# Patient Record
Sex: MALE | Race: WHITE | Employment: FULL TIME | URBAN - METROPOLITAN AREA
[De-identification: names, ages, dates, MRNs, and addresses within clinical notes are randomized per-mention and may not be internally consistent; named-entity substitution may affect disease eponyms.]

---

## 2017-03-29 ENCOUNTER — HOSPITAL ENCOUNTER (INPATIENT)
Facility: CLINIC | Age: 62
LOS: 10 days | Discharge: HOME | DRG: 222 | End: 2017-04-08
Attending: INTERNAL MEDICINE | Admitting: INTERNAL MEDICINE
Payer: COMMERCIAL

## 2017-03-29 DIAGNOSIS — I50.23 ACUTE ON CHRONIC SYSTOLIC HEART FAILURE (H): Primary | ICD-10-CM

## 2017-03-29 DIAGNOSIS — G47.00 INSOMNIA, UNSPECIFIED TYPE: ICD-10-CM

## 2017-03-29 DIAGNOSIS — Z79.2 PROPHYLACTIC ANTIBIOTIC: ICD-10-CM

## 2017-03-29 DIAGNOSIS — I50.21 ACUTE SYSTOLIC CONGESTIVE HEART FAILURE (H): ICD-10-CM

## 2017-03-29 PROBLEM — I50.9 CHF (CONGESTIVE HEART FAILURE) (H): Status: ACTIVE | Noted: 2017-03-29

## 2017-03-29 LAB
ALBUMIN SERPL-MCNC: 3.6 G/DL (ref 3.4–5)
ALP SERPL-CCNC: 204 U/L (ref 40–150)
ALT SERPL W P-5'-P-CCNC: 99 U/L (ref 0–70)
ANION GAP SERPL CALCULATED.3IONS-SCNC: 7 MMOL/L (ref 3–14)
AST SERPL W P-5'-P-CCNC: 60 U/L (ref 0–45)
BILIRUB DIRECT SERPL-MCNC: 1.2 MG/DL (ref 0–0.2)
BILIRUB SERPL-MCNC: 2.3 MG/DL (ref 0.2–1.3)
BUN SERPL-MCNC: 20 MG/DL (ref 7–30)
CALCIUM SERPL-MCNC: 8.6 MG/DL (ref 8.5–10.1)
CHLORIDE SERPL-SCNC: 98 MMOL/L (ref 94–109)
CO2 SERPL-SCNC: 31 MMOL/L (ref 20–32)
CREAT SERPL-MCNC: 0.94 MG/DL (ref 0.66–1.25)
ERYTHROCYTE [DISTWIDTH] IN BLOOD BY AUTOMATED COUNT: 15.8 % (ref 10–15)
GFR SERPL CREATININE-BSD FRML MDRD: 81 ML/MIN/1.7M2
GLUCOSE SERPL-MCNC: 111 MG/DL (ref 70–99)
HCT VFR BLD AUTO: 50.7 % (ref 40–53)
HGB BLD-MCNC: 16.1 G/DL (ref 13.3–17.7)
INR PPP: 1.2 (ref 0.86–1.14)
MCH RBC QN AUTO: 33.1 PG (ref 26.5–33)
MCHC RBC AUTO-ENTMCNC: 31.8 G/DL (ref 31.5–36.5)
MCV RBC AUTO: 104 FL (ref 78–100)
NT-PROBNP SERPL-MCNC: 2597 PG/ML (ref 0–900)
PLATELET # BLD AUTO: 217 10E9/L (ref 150–450)
POTASSIUM SERPL-SCNC: 3.7 MMOL/L (ref 3.4–5.3)
PROT SERPL-MCNC: 8 G/DL (ref 6.8–8.8)
RBC # BLD AUTO: 4.86 10E12/L (ref 4.4–5.9)
SODIUM SERPL-SCNC: 136 MMOL/L (ref 133–144)
WBC # BLD AUTO: 9.1 10E9/L (ref 4–11)

## 2017-03-29 PROCEDURE — 85027 COMPLETE CBC AUTOMATED: CPT | Performed by: INTERNAL MEDICINE

## 2017-03-29 PROCEDURE — 21400006 ZZH R&B CCU INTERMEDIATE UMMC

## 2017-03-29 PROCEDURE — 36415 COLL VENOUS BLD VENIPUNCTURE: CPT | Performed by: INTERNAL MEDICINE

## 2017-03-29 PROCEDURE — 25000128 H RX IP 250 OP 636: Performed by: INTERNAL MEDICINE

## 2017-03-29 PROCEDURE — 25000132 ZZH RX MED GY IP 250 OP 250 PS 637: Performed by: INTERNAL MEDICINE

## 2017-03-29 PROCEDURE — 80076 HEPATIC FUNCTION PANEL: CPT | Performed by: INTERNAL MEDICINE

## 2017-03-29 PROCEDURE — 80048 BASIC METABOLIC PNL TOTAL CA: CPT | Performed by: INTERNAL MEDICINE

## 2017-03-29 PROCEDURE — 83880 ASSAY OF NATRIURETIC PEPTIDE: CPT | Performed by: INTERNAL MEDICINE

## 2017-03-29 PROCEDURE — 85610 PROTHROMBIN TIME: CPT | Performed by: INTERNAL MEDICINE

## 2017-03-29 PROCEDURE — 40000556 ZZH STATISTIC PERIPHERAL IV START W US GUIDANCE

## 2017-03-29 PROCEDURE — 99221 1ST HOSP IP/OBS SF/LOW 40: CPT | Mod: GC | Performed by: INTERNAL MEDICINE

## 2017-03-29 RX ORDER — SIMVASTATIN 20 MG
20 TABLET ORAL EVERY EVENING
Status: DISCONTINUED | OUTPATIENT
Start: 2017-03-30 | End: 2017-04-07

## 2017-03-29 RX ORDER — HEPARIN SODIUM 5000 [USP'U]/.5ML
5000 INJECTION, SOLUTION INTRAVENOUS; SUBCUTANEOUS EVERY 8 HOURS
Status: DISCONTINUED | OUTPATIENT
Start: 2017-03-29 | End: 2017-03-30

## 2017-03-29 RX ORDER — ACETAMINOPHEN 325 MG/1
650 TABLET ORAL EVERY 4 HOURS PRN
Status: DISCONTINUED | OUTPATIENT
Start: 2017-03-29 | End: 2017-04-08 | Stop reason: HOSPADM

## 2017-03-29 RX ORDER — DIGOXIN 125 MCG
250 TABLET ORAL DAILY
Status: DISCONTINUED | OUTPATIENT
Start: 2017-03-30 | End: 2017-04-08 | Stop reason: HOSPADM

## 2017-03-29 RX ORDER — LIDOCAINE 40 MG/G
CREAM TOPICAL
Status: DISCONTINUED | OUTPATIENT
Start: 2017-03-29 | End: 2017-04-03

## 2017-03-29 RX ORDER — ACETAMINOPHEN 650 MG/1
650 SUPPOSITORY RECTAL EVERY 4 HOURS PRN
Status: DISCONTINUED | OUTPATIENT
Start: 2017-03-29 | End: 2017-04-08 | Stop reason: HOSPADM

## 2017-03-29 RX ORDER — AMOXICILLIN 250 MG
1-2 CAPSULE ORAL 2 TIMES DAILY
Status: DISCONTINUED | OUTPATIENT
Start: 2017-03-29 | End: 2017-04-08 | Stop reason: HOSPADM

## 2017-03-29 RX ORDER — ASPIRIN 81 MG/1
81 TABLET ORAL DAILY
Status: DISCONTINUED | OUTPATIENT
Start: 2017-03-30 | End: 2017-04-08 | Stop reason: HOSPADM

## 2017-03-29 RX ORDER — ONDANSETRON 4 MG/1
4 TABLET, ORALLY DISINTEGRATING ORAL EVERY 6 HOURS PRN
Status: DISCONTINUED | OUTPATIENT
Start: 2017-03-29 | End: 2017-04-08 | Stop reason: HOSPADM

## 2017-03-29 RX ORDER — TRAZODONE HYDROCHLORIDE 50 MG/1
50 TABLET, FILM COATED ORAL
Status: DISCONTINUED | OUTPATIENT
Start: 2017-03-29 | End: 2017-04-06

## 2017-03-29 RX ORDER — ALUMINA, MAGNESIA, AND SIMETHICONE 2400; 2400; 240 MG/30ML; MG/30ML; MG/30ML
15-30 SUSPENSION ORAL EVERY 4 HOURS PRN
Status: DISCONTINUED | OUTPATIENT
Start: 2017-03-29 | End: 2017-04-08 | Stop reason: HOSPADM

## 2017-03-29 RX ORDER — FUROSEMIDE 10 MG/ML
80 INJECTION INTRAMUSCULAR; INTRAVENOUS
Status: DISCONTINUED | OUTPATIENT
Start: 2017-03-29 | End: 2017-03-30

## 2017-03-29 RX ORDER — NITROGLYCERIN 0.4 MG/1
0.4 TABLET SUBLINGUAL EVERY 5 MIN PRN
Status: DISCONTINUED | OUTPATIENT
Start: 2017-03-29 | End: 2017-04-08 | Stop reason: HOSPADM

## 2017-03-29 RX ORDER — ONDANSETRON 2 MG/ML
4 INJECTION INTRAMUSCULAR; INTRAVENOUS EVERY 6 HOURS PRN
Status: DISCONTINUED | OUTPATIENT
Start: 2017-03-29 | End: 2017-04-08 | Stop reason: HOSPADM

## 2017-03-29 RX ADMIN — SENNOSIDES AND DOCUSATE SODIUM 1 TABLET: 8.6; 5 TABLET ORAL at 20:59

## 2017-03-29 RX ADMIN — HEPARIN SODIUM 5000 UNITS: 5000 INJECTION, SOLUTION INTRAVENOUS; SUBCUTANEOUS at 18:52

## 2017-03-29 RX ADMIN — FUROSEMIDE 80 MG: 10 INJECTION, SOLUTION INTRAVENOUS at 18:53

## 2017-03-29 NOTE — IP AVS SNAPSHOT
[address] [address]    Phone:  [phone]                                       After Visit Summary   3/29/2017    Danielito Chu    MRN: [id number]           After Visit Summary Signature Page     I have received my discharge instructions, and my questions have been answered. I have discussed any challenges I see with this plan with the nurse or doctor.    ..........................................................................................................................................  Patient/Patient Representative Signature      ..........................................................................................................................................  Patient Representative Print Name and Relationship to Patient    ..................................................               ................................................  Date                                            Time    ..........................................................................................................................................  Reviewed by Signature/Title    ...................................................              ..............................................  Date                                                            Time

## 2017-03-29 NOTE — IP AVS SNAPSHOT
MRN:[id number]                      After Visit Summary   3/29/2017    Danielito Chu    MRN: [id number]           Thank you!     Thank you for choosing [facility] for your care. Our goal is always to provide you with excellent care. Hearing back from our patients is one way we can continue to improve our services. Please take a few minutes to complete the written survey that you may receive in the mail after you visit with us. Thank you!        Patient Information     Date Of Birth          1955        Designated Caregiver       Most Recent Value    Caregiver    Will someone help with your care after discharge? no      About your hospital stay     You were admitted on:  March 29, 2017 You last received care in the:  [address] [facility]    You were discharged on:  April 8, 2017        Reason for your hospital stay       Admitted for acute on chronic systolic heart failure                  Who to Call     For medical emergencies, please call 911.  For non-urgent questions about your medical care, please call your primary care provider or clinic, [phone]          Attending Provider     Provider Lorena Padilla MD Cardiology       Primary Care Provider Office Phone # Fax #    Bashir Hines [phone]       [place] [address]        After Care Instructions     Activity       Your activity upon discharge: activity as tolerated            Diet       Follow this diet upon discharge: Orders Placed This Encounter      Fluid restriction 2000 ML FLUID      Advance Diet as Tolerated: Regular Diet Adult (2g Na)                  Follow-up Appointments     Adult [facility] Follow-up and recommended labs and tests       Follow up with primary care provider, BASHIR HINES and with the core clinic, within 7 days to evaluate medication change.  The following labs/tests are recommended: BMP, Mg.      Appointments on University and/or  [facility] (with [facility] or [facility] provider or service). Call [phone] if you haven't heard regarding these appointments within 7 days of discharge.                  Further instructions from your care team         Home Care after an ICD implant    Wound care:  Keep your incision (surgery wound) dry for 3 days.  After 3 days, you may remove the outer bandage.  Keep the strips of tape on.  They will be removed at your clinic visit.  Check for signs of infection each day.  These include increased redness, swelling, drainage or a fever over 101 F (38.3 C).  Call us immediately if you see any of these signs.  If there are no signs of infection, you may shower in 3 days.  Do not submerge the incision (in a bath tub, hot tub, or swimming pool) until fully healed.  If Dermabond has been applied to your incision.  Do not apply powder or lotion to the incision for 14 days. You may shower in the morning.    Pain:   You may have mild to moderate pain for 3 to 5 days.  Take acetaminophen (Tylenol) or ibuprofen (Advil) for the pain.  Call us if the pain is severe or lasts more than 5 days.    Activity:  You should slowly go back to your normal activities after 24 hours.  Healing will take 4 to 6 weeks.  No driving for 3 days  Avoid climbing a ladder alone.  It is best to stay within 4 feet of the ground.  Avoid anything that may cause rough contact or a hard hit to your chest.  This includes football, hockey, and other contact sports.  Do not go swimming or boating alone.      For at least 4 weeks:  Do not raise your affected arm above your shoulder.  Do not use your affected arm to push, pull, or lift anything over 10 pounds.  Avoid repetitive upper body activities for 6 weeks (ie: golf, swimming, and weight lifting)    Follow Up Visits:  Return to the clinic in 7 to 10 days to have your device and wound checked.  This will be setup with your primary care physician.    Telling others about your device:  Before you have any  medical tests or treatments, tell the doctors, dentists, and other care providers about your device.  There are a few tests and treatments that may interfere with your device.  (These include MRI, radiation therapy, electrocautery, and others.)  Your care team may need to take special steps to keep you safe.  Before you leave the hospital, you will receive a temporary ID card.  A permanent card will be mailed to you about 6 to 8 weeks later.  Always carry the ID card with you.  It has important details about your device.  You should also get a MedicAlert ID.  Please ask us for a MedicAlert brochure, or go to [email or web address].    Safety near electrical equipment:  All of these are safe to use when in good repair:    Microwaves    Radios    Cordless phone    Remote controls    Small electrical tools  Cell phones: Keep cell phones at least 6 inches from your device.  Do not carry the phone in a pocket near your device.  Security costello: It is okay to walk through security costello at the airports and department stores.  Tell airport security that you have a defibrillator.  They should keep the screening wand at least 6 inches from your device.  Full-body scanners are safe.    Avoid the following:    MRI tests in the hospital unless you have a MRI safe defibrillator.    Arc welding, chain saws and high-powered industrial or commercial tools.    Power lines, power plants and large power generators.    Electric body fat scales.    Magnetic mattress pads or pillow.    What to do after a shock from your ICD:  1. Stop what you re doing and rest.  2. If you feel fine before and after the shock, call the device clinic.  3. If you feel unwell or receive more than one shock, call 911 or go to the emergency room.  A shock could mean that your condition has changed and you may need to see a doctor.    Questions?  Please call [facility]    Device Nurse:          Business Hours:  [phone]    After Hours:   "[phone]   Choose option 4, then ask for the                                                   on-call device nurse at job code [id number].    Your next device clinic appointment will be setup by our device nurse who will call you within a week of your discharge.                                   [facility] - [address]  [address] [place] [address]  [id number]          General Recommendations To Control Heart Failure When You Get Home     Instructions To Patients and Families: Please read and check off each of these important instructions as you do them when you get home.           Weight and symptoms      ___ Put a scale in your bathroom  ___ Post a weight chart or calendar next to the scale  ___Weigh yourself every day as soon as you you get up in the morning. You should only be wearing your pajamas. Write your weight on the chart/calendar.  ___ Bring your weight chart/calendar with you to all appointments    ___Call your doctor if you gain 2 pounds in 1 day or 5 pounds in 1 week from your \"dry\" weight (baseline weight). Also call your doctor if you have shortness of breath that gets worse over time, leg swelling or fatigue.         Medicines and diet     ___ Make sure to take your medicines as prescribed.    ___Bring a current list of your medicines and all of your medicine bottles with you to all appointments.    ___ Limit fluids if you still have swelling or shortness of breath, or if your doctor tells you to do so.  ___ Eat less than 2000 mg of sodium (salt) every day. Read food labels, and do not add salt to meals.   ___ Heart healthy diet with low fat and low cholesterol          Activity and suggested lifestyle changes    ___ Stay active. Talk to your doctor about an exercise program that is safe for your heart.    ___ Stop smoking. Reduce alcohol use.      ___ Lose weight if you are overweight. Extra weight puts a lot of stress on the heart.          " Control for Leg Swelling   ___ Keep your legs elevated (raised) as needed for swelling. If swelling is uncomfortable or elevation doesn t help, ask your doctor about using ACE wrap or Jobst stockings.          Follow-up appointments   ___ Make a [facility] Clinic appointment with a basic metabolic panel lab draw 3 to 5 days after you leave the hospital. Call one of the following locations:   [facility]  [phone],  [facility] [phone],  [facility]  [phone].     ___ Make sure to take your medications as prescribed and bring an accurate list of your medications and your weight chart/calendar to your follow up appointment at the [facility].R[facility] Clinic for continued education and adjustments          What is the CORE clinic?    The C.O.R.E (Cardiomyopathy, Optimization, Rehabilitation, Education) Clinic is a heart failure specialty clinic within the [facility]. At C.O.R.E., you will work with nurse practitioners to carefully adjust medicines, get education and learn who and when to call if symptoms appear. C.O.R.E nurses specialize in helping you:    better understand your disease.    slow the progress of your disease.    improve the length and quality of your life.    detect future heart problems before they become life threatening.    avoid hospital stays.            Pending Results     Date and Time Order Name Status Description    4/8/2017 0000 Cardiolipin Trisha IgG and IgM In process     4/8/2017 0000 PRA Single Antigen IgG Antibody In process     4/8/2017 0000 HLA Typing Complete SOT Recipient In process     4/8/2017 0000 Lupus panel In process     4/8/2017 0000 Cystatin C with GFR In process     4/7/2017 2225 Urine Culture Aerobic Bacterial Preliminary     4/7/2017 1423 EKG 12-lead, tracing only Preliminary             Statement of Approval     Ordered          04/08/17 1305  I have  "reviewed and agree with all the recommendations and orders detailed in this document.  EFFECTIVE NOW     Approved and electronically signed by:  Roman Oreilly MD             Admission Information     Date & Time Provider Department Dept. Phone    3/29/2017 Lorena Watkins MD [address] [facility] [phone]      Your Vitals Were     Blood Pressure Pulse Temperature Respirations Height Weight    96/57 (BP Location: Right arm) 83 97.3  F (36.3  C) (Oral) 15 1.765 m (5' 9.5\") 102 kg (224 lb 12.8 oz)    Pulse Oximetry BMI (Body Mass Index)                94% 32.72 kg/m2          MyChart Information     [employer] lets you send messages to your doctor, view your test results, renew your prescriptions, schedule appointments and more. To sign up, go to [email or web address] . Click on \"Log in\" on the left side of the screen, which will take you to the Welcome page. Then click on \"Sign up Now\" on the right side of the page.     You will be asked to enter the access code listed below, as well as some personal information. Please follow the directions to create your username and password.     Your access code is: [email or web address]  Expires: 2017 11:43 AM     Your access code will  in 90 days. If you need help or a new code, please call your [place] clinic or [phone].        Care EveryWhere ID     This is your Care EveryWhere ID. This could be used by other organizations to access your [facility] [place] medical records  [id number]           Review of your medicines      START taking        Dose / Directions    aspirin 81 MG EC tablet   Used for:  Acute systolic congestive heart failure (H)        Dose:  81 mg   Take 1 tablet (81 mg) by mouth daily   Quantity:  30 tablet   Refills:  3       atorvastatin 10 MG tablet   Commonly known as:  LIPITOR   Used for:  Acute systolic congestive heart failure (H)        Dose:  10 mg   Take 1 tablet (10 mg) by mouth daily   Quantity:  30 tablet   Refills:  1       cephALEXin " 500 MG capsule   Commonly known as:  KEFLEX   Indication:  Surgical Prophylaxis   Used for:  Prophylactic antibiotic        Dose:  500 mg   Take 1 capsule (500 mg) by mouth every 6 hours for 5 days   Quantity:  20 capsule   Refills:  0       digoxin 250 MCG tablet   Commonly known as:  LANOXIN   Used for:  Acute on chronic systolic heart failure (H)        Dose:  250 mcg   Take 1 tablet (250 mcg) by mouth daily   Quantity:  30 tablet   Refills:  1       furosemide 40 MG tablet   Commonly known as:  LASIX   Used for:  Acute systolic congestive heart failure (H)        Dose:  40 mg   Take 1 tablet (40 mg) by mouth daily   Quantity:  30 tablet   Refills:  1       hydrALAZINE 100 MG Tabs tablet   Commonly known as:  APRESOLINE   Used for:  Acute on chronic systolic heart failure (H)        Dose:  100 mg   Take 1 tablet (100 mg) by mouth 3 times daily   Quantity:  120 tablet   Refills:  3       isosorbide Dinitrate 40 MG Tabs   Commonly known as:  ISORDIL   Used for:  Acute on chronic systolic heart failure (H)        Dose:  40 mg   Take 1 tablet (40 mg) by mouth 3 times daily (before meals)   Quantity:  120 tablet   Refills:  3       metoprolol 25 MG 24 hr tablet   Commonly known as:  TOPROL-XL   Used for:  Acute on chronic systolic heart failure (H)        Dose:  37.5 mg   Take 1.5 tablets (37.5 mg) by mouth daily   Quantity:  60 tablet   Refills:  3       ramipril 2.5 MG capsule   Commonly known as:  ALTACE   Used for:  Acute on chronic systolic heart failure (H)        Dose:  7.5 mg   Take 3 capsules (7.5 mg) by mouth 2 times daily   Quantity:  90 capsule   Refills:  3       spironolactone 25 MG tablet   Commonly known as:  ALDACTONE   Used for:  Acute on chronic systolic heart failure (H)        Dose:  25 mg   Take 1 tablet (25 mg) by mouth daily   Quantity:  30 tablet   Refills:  3       thiamine 100 MG tablet   Used for:  Acute on chronic systolic heart failure (H)        Dose:  100 mg   Take 1 tablet (100 mg) by  mouth daily   Quantity:  60 tablet   Refills:  1       traZODone 100 MG tablet   Commonly known as:  DESYREL   Used for:  Insomnia, unspecified type        Dose:  100 mg   Take 1 tablet (100 mg) by mouth nightly as needed for sleep   Quantity:  90 tablet   Refills:  1            Where to get your medicines      These medications were sent to [facility] - [place], [place] - [address] [facility] [address]  [address], [facility] [address]     Phone:  [phone]     aspirin 81 MG EC tablet    atorvastatin 10 MG tablet    cephALEXin 500 MG capsule    digoxin 250 MCG tablet    furosemide 40 MG tablet    hydrALAZINE 100 MG Tabs tablet    isosorbide Dinitrate 40 MG Tabs    metoprolol 25 MG 24 hr tablet    ramipril 2.5 MG capsule    spironolactone 25 MG tablet    thiamine 100 MG tablet    traZODone 100 MG tablet                Protect others around you: Learn how to safely use, store and throw away your medicines at www.disposemymeds.org.             Medication List: This is a list of all your medications and when to take them. Check marks below indicate your daily home schedule. Keep this list as a reference.      Medications           Morning Afternoon Evening Bedtime As Needed    aspirin 81 MG EC tablet   Take 1 tablet (81 mg) by mouth daily   Last time this was given:  81 mg on 4/8/2017  8:51 AM   Next Dose Due:  4/9                                    atorvastatin 10 MG tablet   Commonly known as:  LIPITOR   Take 1 tablet (10 mg) by mouth daily   Last time this was given:  10 mg on 4/7/2017  7:08 PM   Next Dose Due:  Tonight at bed time                                   cephALEXin 500 MG capsule   Commonly known as:  KEFLEX   Take 1 capsule (500 mg) by mouth every 6 hours for 5 days   Next Dose Due:  Today at 6pm            6am    noon       6pm       midnight           digoxin 250 MCG tablet   Commonly known as:  LANOXIN   Take 1 tablet (250 mcg) by mouth daily   Last time this was given:  250 mcg on  4/8/2017  1:53 PM   Next Dose Due:  4/9 at 2pm                2pm                   furosemide 40 MG tablet   Commonly known as:  LASIX   Take 1 tablet (40 mg) by mouth daily   Last time this was given:  40 mg on 4/8/2017 12:29 PM   Next Dose Due:  4/9 at 8am            8am                       hydrALAZINE 100 MG Tabs tablet   Commonly known as:  APRESOLINE   Take 1 tablet (100 mg) by mouth 3 times daily   Last time this was given:  100 mg on 4/8/2017  1:53 PM   Next Dose Due:  Tonight at 8pm            8am       2pm           8pm           isosorbide Dinitrate 40 MG Tabs   Commonly known as:  ISORDIL   Take 1 tablet (40 mg) by mouth 3 times daily (before meals)   Last time this was given:  40 mg on 4/8/2017 12:29 PM   Next Dose Due:  Today at 5pm            7:30am       noon       5pm               metoprolol 25 MG 24 hr tablet   Commonly known as:  TOPROL-XL   Take 1.5 tablets (37.5 mg) by mouth daily   Last time this was given:  37.5 mg on 4/8/2017  8:53 AM   Next Dose Due:  4/8 am            8am                       ramipril 2.5 MG capsule   Commonly known as:  ALTACE   Take 3 capsules (7.5 mg) by mouth 2 times daily   Last time this was given:  7.5 mg on 4/8/2017  8:53 AM   Next Dose Due:  Tonight at 8pm            8am           8pm               spironolactone 25 MG tablet   Commonly known as:  ALDACTONE   Take 1 tablet (25 mg) by mouth daily   Last time this was given:  25 mg on 4/8/2017  8:51 AM   Next Dose Due:  4/9 at 8am            8am                       thiamine 100 MG tablet   Take 1 tablet (100 mg) by mouth daily   Last time this was given:  100 mg on 4/8/2017  8:52 AM   Next Dose Due:  4/8 at 8am            8am                       traZODone 100 MG tablet   Commonly known as:  DESYREL   Take 1 tablet (100 mg) by mouth nightly as needed for sleep   Last time this was given:  100 mg on 4/7/2017 11:07 PM   Next Dose Due:  As needed

## 2017-03-29 NOTE — H&P
[employer] History and Physical    Danielito Chu MRN# [id number]   Age: 61 year old YOB: 1955     Date of Admission:  3/29/2017    Primary care provider: No primary care provider on file.          Assessment and Plan:   Assessment:  Mr. Danielito Chu is a 62 y/o M with pmh significant for non-ischemic, dilated cardiomyopathy, systolic and diastolic CHF (EF 15% most recently but 30% on admission to [place]), mild-moderate MR, pre-diabetes, former tobacco use (0.3 packs for 37 years), untreated sleep apnea (central and obstructive), HTN, hepatic congestion 2/2 CHF, who presents as a direct admission from [place], [place] for further CHF treatment and workup. He is currently hemodynamically stable, with reassuring signs of normal blood pressure, non-mottled extremities, adequate urine output, no oxygen requirement, and the physical exam as below.    Plan:  #H/o NIDCM  #H/o systolic/diastolic CHF (EF15%)  #Mild-moderate MR  #HTN  #Congestive hepatopathy  - Continue IV lasix 80mg BID given improvement in renal function and decreasing weight/anasarca  - Continue ASA 81 daily  - Continue digoxin 0.25mg daily  - Drop daily Toprol XL to 37.5 given active CHF exacerbation  - Continue simvastatin 20mg daily  - Will consider restarting ACEI, spironolactone during this hospitalization (ACEI first then later spirono)  - will discuss potential RHC/LHC/transplant workup during this hospitalization  - BMP, magnesium in AM  - Strict I/O's, daily weights, cardiac diet  - He states he has already been screened for HIV and Hep C, both negative    Code status: full code  Activity: up ad moshe  Diet: cardiac diet  PPX: continue subq heparin 5k  Disposition: admitted under inpatient basis for further workup and treatment of CHF exacerbation and potential advanced heart failure therapies    Patient staffed with Dr. Watkins.    Candido Caraballo MD  PGY-3 Internal Medicien  [facility]          Chief Complaint:    Transferred for further CHF treatment and workup     History is obtained from the patient, chart review          History of Present Illness:   Mr. Danielito Chu is a 60 y/o M with pmh significant for non-ischemic, dilated cardiomyopathy, systolic and diastolic CHF (EF 15% most recently but 30% on admission to [place]), mild-moderate MR, pre-diabetes, former tobacco use (0.3 packs for 37 years), untreated sleep apnea (central and obstructive), HTN, hepatic congestion 2/2 CHF, who presents as a direct admission from [place], [place] for further CHF treatment and workup. He initially presented to [facility] in [place], [place] on 3/21/17 with progressive SOB, lower extremity weakness, oliguria, weight gain, orthopnea. BNP elevated to 1300, LFTs elevated to AST of 102, ALT of 83. CXR on admission reportedly c/w CHF. Troponin elevated on admission to 0.3; attributed to demand ischemia. He was started on IV lasix and continued on his home metoprolol, digoxin and spironolactone; however, he did not diurese well during that hospitalization, with minimal UOP. A repeat echocardiogram was 15%. After discussion between the team and the patient, decision was made that he would transfer to the [facility] for advanced heart failure therapies.     Vitals on discharge were /87, pulse 81, R 22, TAF, O2 100 on BiPAP.    Last labs obtained were as follows:      Cardiac studies:  Echocardiogram 3/22/17:  Conclusions:   Markedly reduced left ventricular systolic function.  The ejection fraction is visually estimated to be 15%.  Mild aortic regurgitation.  Mild-to-moderate mitral regurgitation.  Low normal right ventricular systolic function.  Pulmonary artery systolic pressure is measured at 37 mmHg.  A fib.              In the room, his above history was reiterated. He states that back in 2005, he was admitted to a hospital in [place] and diagnosed with a 'viral infection of his heart'. He was treated with IV diuresis and 'fluid  "poured out'. He was also hospitalized last year for hypervolemia requiring IV diuretics, this time in [facility]. His cardiologist was Ortega Thibodeaux in [place]. He doesn't think he has ever had an angiogram. He believes he can walk about 100-200 feet before becoming dyspneic and needing to stop.    Regarding his recent hospitalization at [place], he states that he has had good UOP in the last several days, and his weight is down a bit. He believes he was around 241lbs at admission, and is now down to 234lbs. He states that last night was the first night that he was able to get any sleep. He denies any extensive binge drinking history.          Past Medical History:   As above in HPI         Social History:   Former tobacco user  Casual drinker, denies binge drinking  No illicit drug use         Family History:   - Father  at 86 from CHF  - Uncle  at 66 from CHF  - Uncle  at 62 from MI  - Mother  during CABG at age 41         Immunizations:   Not asked         Allergies:   NKA         Medications:   Furosemide 40mg BID  Simvastatin 20mg daily  Spironolactone 12.5mg daily  Ramipril 2.5mg daily  Digoxin 0.25mg daily  Toprol XL 75mg daily  ASA 81mg daily           Review of Systems:   The Review of Systems is negative other than noted in the HPI      /81  Temp 98  F (36.7  C) (Axillary)  Ht 1.765 m (5' 9.5\")  Wt 105.6 kg (232 lb 12.8 oz)  SpO2 96%  BMI 33.89 kg/m2    Physical Exam   Constitutional: He is oriented to person, place, and time and well-developed, well-nourished, and in no distress.   HENT:   Head: Normocephalic and atraumatic.   Some mild cyanosis of lips   Eyes: EOM are normal. No scleral icterus.   Neck:   JVD to midneck bilaterally when sitting at 90*   Cardiovascular:   Holosystolic murmur hear along left parasternal border. RRR   Pulmonary/Chest: Effort normal and breath sounds normal. He has no wheezes. He has no rales.   Abdominal: Soft. Bowel sounds are normal. "   Musculoskeletal: Normal range of motion.   3+ pitting edema to knees bilaterally   Neurological: He is alert and oriented to person, place, and time. No cranial nerve deficit. Gait normal.   Skin: Skin is dry.   Distal extremities cool to palpation          Data:   See above

## 2017-03-30 LAB
ALBUMIN SERPL-MCNC: 3.2 G/DL (ref 3.4–5)
ALP SERPL-CCNC: 174 U/L (ref 40–150)
ALT SERPL W P-5'-P-CCNC: 81 U/L (ref 0–70)
ANION GAP SERPL CALCULATED.3IONS-SCNC: 16 MMOL/L (ref 3–14)
ANION GAP SERPL CALCULATED.3IONS-SCNC: 9 MMOL/L (ref 3–14)
AST SERPL W P-5'-P-CCNC: 52 U/L (ref 0–45)
BASE EXCESS BLDV CALC-SCNC: 9.6 MMOL/L
BILIRUB DIRECT SERPL-MCNC: 0.8 MG/DL (ref 0–0.2)
BILIRUB SERPL-MCNC: 1.9 MG/DL (ref 0.2–1.3)
BUN SERPL-MCNC: 16 MG/DL (ref 7–30)
BUN SERPL-MCNC: 20 MG/DL (ref 7–30)
CALCIUM SERPL-MCNC: 8.6 MG/DL (ref 8.5–10.1)
CALCIUM SERPL-MCNC: 8.8 MG/DL (ref 8.5–10.1)
CHLORIDE SERPL-SCNC: 102 MMOL/L (ref 94–109)
CHLORIDE SERPL-SCNC: 99 MMOL/L (ref 94–109)
CO2 SERPL-SCNC: 19 MMOL/L (ref 20–32)
CO2 SERPL-SCNC: 31 MMOL/L (ref 20–32)
CREAT SERPL-MCNC: 0.86 MG/DL (ref 0.66–1.25)
CREAT SERPL-MCNC: 1.01 MG/DL (ref 0.66–1.25)
DIGOXIN SERPL-MCNC: 0.8 UG/L (ref 0.5–2)
FERRITIN SERPL-MCNC: 290 NG/ML (ref 26–388)
GFR SERPL CREATININE-BSD FRML MDRD: 75 ML/MIN/1.7M2
GFR SERPL CREATININE-BSD FRML MDRD: ABNORMAL ML/MIN/1.7M2
GLUCOSE SERPL-MCNC: 116 MG/DL (ref 70–99)
GLUCOSE SERPL-MCNC: 95 MG/DL (ref 70–99)
HCO3 BLDV-SCNC: 35 MMOL/L (ref 21–28)
HIV 1+2 AB+HIV1 P24 AG SERPL QL IA: NORMAL
IRON SATN MFR SERPL: 14 % (ref 15–46)
IRON SERPL-MCNC: 56 UG/DL (ref 35–180)
LACTATE BLD-SCNC: 1.1 MMOL/L (ref 0.7–2.1)
MAGNESIUM SERPL-MCNC: 2.3 MG/DL (ref 1.6–2.3)
MAGNESIUM SERPL-MCNC: 2.5 MG/DL (ref 1.6–2.3)
O2/TOTAL GAS SETTING VFR VENT: ABNORMAL %
PCO2 BLDV: 54 MM HG (ref 40–50)
PH BLDV: 7.42 PH (ref 7.32–7.43)
PO2 BLDV: 17 MM HG (ref 25–47)
POTASSIUM SERPL-SCNC: 3.6 MMOL/L (ref 3.4–5.3)
POTASSIUM SERPL-SCNC: 3.7 MMOL/L (ref 3.4–5.3)
PROT SERPL-MCNC: 7.2 G/DL (ref 6.8–8.8)
SODIUM SERPL-SCNC: 137 MMOL/L (ref 133–144)
SODIUM SERPL-SCNC: 139 MMOL/L (ref 133–144)
T4 FREE SERPL-MCNC: 0.84 NG/DL (ref 0.76–1.46)
TIBC SERPL-MCNC: 411 UG/DL (ref 240–430)
TRANSFERRIN SERPL-MCNC: 249 MG/DL (ref 210–360)
TSH SERPL DL<=0.05 MIU/L-ACNC: 9.06 MU/L (ref 0.4–4)

## 2017-03-30 PROCEDURE — 83605 ASSAY OF LACTIC ACID: CPT | Performed by: STUDENT IN AN ORGANIZED HEALTH CARE EDUCATION/TRAINING PROGRAM

## 2017-03-30 PROCEDURE — 80048 BASIC METABOLIC PNL TOTAL CA: CPT | Performed by: STUDENT IN AN ORGANIZED HEALTH CARE EDUCATION/TRAINING PROGRAM

## 2017-03-30 PROCEDURE — 84439 ASSAY OF FREE THYROXINE: CPT | Performed by: STUDENT IN AN ORGANIZED HEALTH CARE EDUCATION/TRAINING PROGRAM

## 2017-03-30 PROCEDURE — 25000132 ZZH RX MED GY IP 250 OP 250 PS 637

## 2017-03-30 PROCEDURE — 84443 ASSAY THYROID STIM HORMONE: CPT | Performed by: STUDENT IN AN ORGANIZED HEALTH CARE EDUCATION/TRAINING PROGRAM

## 2017-03-30 PROCEDURE — 25000128 H RX IP 250 OP 636: Performed by: INTERNAL MEDICINE

## 2017-03-30 PROCEDURE — 25000128 H RX IP 250 OP 636: Performed by: STUDENT IN AN ORGANIZED HEALTH CARE EDUCATION/TRAINING PROGRAM

## 2017-03-30 PROCEDURE — 93005 ELECTROCARDIOGRAM TRACING: CPT

## 2017-03-30 PROCEDURE — 25000132 ZZH RX MED GY IP 250 OP 250 PS 637: Performed by: INTERNAL MEDICINE

## 2017-03-30 PROCEDURE — 87389 HIV-1 AG W/HIV-1&-2 AB AG IA: CPT | Performed by: STUDENT IN AN ORGANIZED HEALTH CARE EDUCATION/TRAINING PROGRAM

## 2017-03-30 PROCEDURE — 83540 ASSAY OF IRON: CPT | Performed by: INTERNAL MEDICINE

## 2017-03-30 PROCEDURE — 93010 ELECTROCARDIOGRAM REPORT: CPT | Performed by: INTERNAL MEDICINE

## 2017-03-30 PROCEDURE — 80162 ASSAY OF DIGOXIN TOTAL: CPT | Performed by: STUDENT IN AN ORGANIZED HEALTH CARE EDUCATION/TRAINING PROGRAM

## 2017-03-30 PROCEDURE — 83735 ASSAY OF MAGNESIUM: CPT | Performed by: STUDENT IN AN ORGANIZED HEALTH CARE EDUCATION/TRAINING PROGRAM

## 2017-03-30 PROCEDURE — 99233 SBSQ HOSP IP/OBS HIGH 50: CPT | Mod: GC | Performed by: INTERNAL MEDICINE

## 2017-03-30 PROCEDURE — 36415 COLL VENOUS BLD VENIPUNCTURE: CPT | Performed by: INTERNAL MEDICINE

## 2017-03-30 PROCEDURE — 83735 ASSAY OF MAGNESIUM: CPT | Performed by: INTERNAL MEDICINE

## 2017-03-30 PROCEDURE — 36415 COLL VENOUS BLD VENIPUNCTURE: CPT | Performed by: STUDENT IN AN ORGANIZED HEALTH CARE EDUCATION/TRAINING PROGRAM

## 2017-03-30 PROCEDURE — 80076 HEPATIC FUNCTION PANEL: CPT | Performed by: INTERNAL MEDICINE

## 2017-03-30 PROCEDURE — 82803 BLOOD GASES ANY COMBINATION: CPT | Performed by: STUDENT IN AN ORGANIZED HEALTH CARE EDUCATION/TRAINING PROGRAM

## 2017-03-30 PROCEDURE — 84466 ASSAY OF TRANSFERRIN: CPT | Performed by: INTERNAL MEDICINE

## 2017-03-30 PROCEDURE — 25000132 ZZH RX MED GY IP 250 OP 250 PS 637: Performed by: STUDENT IN AN ORGANIZED HEALTH CARE EDUCATION/TRAINING PROGRAM

## 2017-03-30 PROCEDURE — 82728 ASSAY OF FERRITIN: CPT | Performed by: INTERNAL MEDICINE

## 2017-03-30 PROCEDURE — 83550 IRON BINDING TEST: CPT | Performed by: INTERNAL MEDICINE

## 2017-03-30 PROCEDURE — 21400006 ZZH R&B CCU INTERMEDIATE UMMC

## 2017-03-30 PROCEDURE — 80048 BASIC METABOLIC PNL TOTAL CA: CPT | Performed by: INTERNAL MEDICINE

## 2017-03-30 RX ORDER — POTASSIUM CHLORIDE 1.5 G/1.58G
20-40 POWDER, FOR SOLUTION ORAL
Status: DISCONTINUED | OUTPATIENT
Start: 2017-03-30 | End: 2017-04-01

## 2017-03-30 RX ORDER — POTASSIUM CHLORIDE 750 MG/1
20-40 TABLET, EXTENDED RELEASE ORAL
Status: DISCONTINUED | OUTPATIENT
Start: 2017-03-30 | End: 2017-04-01

## 2017-03-30 RX ORDER — RAMIPRIL 2.5 MG/1
2.5 CAPSULE ORAL DAILY
Status: DISCONTINUED | OUTPATIENT
Start: 2017-03-30 | End: 2017-03-31

## 2017-03-30 RX ORDER — POTASSIUM CHLORIDE 7.45 MG/ML
10 INJECTION INTRAVENOUS
Status: DISCONTINUED | OUTPATIENT
Start: 2017-03-30 | End: 2017-04-01

## 2017-03-30 RX ORDER — POTASSIUM CHLORIDE 750 MG/1
40 TABLET, EXTENDED RELEASE ORAL ONCE
Status: COMPLETED | OUTPATIENT
Start: 2017-03-30 | End: 2017-03-30

## 2017-03-30 RX ORDER — MAGNESIUM SULFATE HEPTAHYDRATE 40 MG/ML
4 INJECTION, SOLUTION INTRAVENOUS EVERY 4 HOURS PRN
Status: DISCONTINUED | OUTPATIENT
Start: 2017-03-30 | End: 2017-04-08 | Stop reason: HOSPADM

## 2017-03-30 RX ORDER — POTASSIUM CHLORIDE 29.8 MG/ML
20 INJECTION INTRAVENOUS
Status: DISCONTINUED | OUTPATIENT
Start: 2017-03-30 | End: 2017-04-01

## 2017-03-30 RX ADMIN — TRAZODONE HYDROCHLORIDE 50 MG: 50 TABLET ORAL at 00:54

## 2017-03-30 RX ADMIN — FUROSEMIDE 10 MG/HR: 10 INJECTION, SOLUTION INTRAVENOUS at 18:25

## 2017-03-30 RX ADMIN — HEPARIN SODIUM 5000 UNITS: 5000 INJECTION, SOLUTION INTRAVENOUS; SUBCUTANEOUS at 11:26

## 2017-03-30 RX ADMIN — FUROSEMIDE 80 MG: 10 INJECTION, SOLUTION INTRAVENOUS at 07:56

## 2017-03-30 RX ADMIN — HEPARIN SODIUM 5000 UNITS: 5000 INJECTION, SOLUTION INTRAVENOUS; SUBCUTANEOUS at 02:05

## 2017-03-30 RX ADMIN — Medication 12.5 MG: at 22:29

## 2017-03-30 RX ADMIN — POTASSIUM CHLORIDE 40 MEQ: 750 TABLET, EXTENDED RELEASE ORAL at 20:35

## 2017-03-30 RX ADMIN — DIGOXIN 250 MCG: 125 TABLET ORAL at 14:05

## 2017-03-30 RX ADMIN — SIMVASTATIN 20 MG: 20 TABLET, FILM COATED ORAL at 20:35

## 2017-03-30 RX ADMIN — SENNOSIDES AND DOCUSATE SODIUM 2 TABLET: 8.6; 5 TABLET ORAL at 07:56

## 2017-03-30 RX ADMIN — ASPIRIN 81 MG: 81 TABLET, COATED ORAL at 07:56

## 2017-03-30 RX ADMIN — Medication 12.5 MG: at 18:06

## 2017-03-30 RX ADMIN — FUROSEMIDE 10 MG/HR: 10 INJECTION, SOLUTION INTRAVENOUS at 09:14

## 2017-03-30 RX ADMIN — METOPROLOL SUCCINATE 37.5 MG: 25 TABLET, EXTENDED RELEASE ORAL at 07:56

## 2017-03-30 RX ADMIN — ACETAMINOPHEN 650 MG: 325 TABLET, FILM COATED ORAL at 23:38

## 2017-03-30 RX ADMIN — POTASSIUM CHLORIDE 20 MEQ: 750 TABLET, EXTENDED RELEASE ORAL at 11:26

## 2017-03-30 RX ADMIN — SENNOSIDES AND DOCUSATE SODIUM 1 TABLET: 8.6; 5 TABLET ORAL at 20:35

## 2017-03-30 RX ADMIN — RAMIPRIL 2.5 MG: 2.5 CAPSULE ORAL at 12:59

## 2017-03-30 NOTE — PLAN OF CARE
Problem: Goal Outcome Summary  Goal: Goal Outcome Summary  Outcome: No Change     Patient is direct admit from [place] for CHF treatment and workup. VSSA, room air, monitor shows SR. CPAP at night with good tolerance. Prn trazodone given at bedtime, sleeping between cares. Up independently, using urinal with good UO. Family at bedside. Continue to monitor and notify Cards 2 with pertinent changes.

## 2017-03-30 NOTE — PROGRESS NOTES
"SPIRITUAL HEALTH SERVICES   [facility] ([place] 6C   ON-CALL VISIT   REFERRAL SOURCE: request on admission   Fabiano was accompanied by his sister.  She said a brother also from the [place] area was in town, though at a hotel as we spoke.  His partner left the room at the start of our conversation.  Fabiano spoke of a Sabianist person \"like yourself\" praying for him and his roommate while he was still in a hospital in [place].  Fabiano said that it had an immediate impact on both his roommate and on his own health.  While his health had previously been declining steadily and he was given three to six months to live, following that prayer and transfer to the  his health rapidly improved and he has since been told that the medical team will be able to give him years more without requiring surgery.  They jointly explored a variety of other topics at length but expressed no further needs.    PLAN: Per his request, will call admissions to change his [employer] code to [employer].   Will notify the unit  of our visit.    GISSELLE Higginbotham.  Staff   Pager [phone]    "

## 2017-03-30 NOTE — PROGRESS NOTES
Focus:  Admission  D: Diagnosis: Admitted from: [facility] via: wheelchair with family.   I: Belongings: Placed in closet; valuables sent home with family. Admission paperwork: complete. Teaching: Call don't fall, use of console, meal times, visiting hours, orientation to unit, when to call for the RN (angina/sob/dizzyness, etc.), and stressed the importance of safety. Access: PIV Telemetry:Placed on pt Ht./Wt.: complete  A: A&Ox3, Skin intact (see adriel score). Up independently. Last BM: 3/28/17 . Diet :appetite good. No complaints.  P: Administer medications per MD order, follow plan of care, and notify MD as necessary with questions/concerns.    Patient admitted to  at 1430. Alert, oriented x4. VSS. Denies pain. Patient reports using CPAP overnight (next shift to request order/request respiratory fit patient for mask/machine). SOB reported with exertion. Edema +2 lower extremities bilaterally. Labs drawn: Potassium 3.7; next shift going to check with CARDS 2 regarding any further action. Diet: low saturated; patient requested to be on 1200ml FR. R PIV placed, SL. L PIV removed from previous hospital ([place]) due to painful IV. IV Lasix and Heparin given. Patient seen by CARDS 2; update if questions/concerns.   Patient and family eager to learn about heart failure guidelines and lifestyle adjustments.

## 2017-03-30 NOTE — PLAN OF CARE
Problem: Goal Outcome Summary  Goal: Goal Outcome Summary  Outcome: No Change  D/I/A: Patient admitted 3/29/17 from [facility] due to worsening heart failure. VSS (Blood pressures stable throughout increase of blood pressure medications today). Alert, oriented x4. Denies pain; although patient reported some discomfort after eating but declined any intervention. Voiding large amounts with IV Lasix running at 10ml/hr via R PIV. Patient reported bowel movements several times daily. Nutritional consult requested due to patient and family having several questions around food/fluid intake related to heart failure. Some teaching completed. CARDS 2 fellow gave verbal order to d/c Heparin due to patient walking the unit x3-4.   P: CARDS 2 following. Plan to continue to diurese and will formulate a plan within the next couple of days. Patient and family very pleasant in room; questions answered.

## 2017-03-30 NOTE — PROGRESS NOTES
"  [facility]  Cardiology I Service  Daily Note  3/30/2017      Event over the past 24 hours:  No acute events overnight.     Hemodynamics:  HR 80~  /87    INs and outs:  In 680 - out 875 - Net -195  In 580 - Out 1.8 - Net -1.270    Vitals:    03/29/17 1635   Weight: 105.6 kg (232 lb 12.8 oz)       Pertinent Medications:  Drips:   Lasix gtt 10 mg/hr    Other Meds:  [id number] mg  Digoxin 259 mcg  Heparin 5000 TID  Metoprolol 37.5 mg qday  Ramipril 2.5 qday  Simvastatin 20 mg qam    Examination:  BP 96/71 (BP Location: Left arm)  Temp 98.1  F (36.7  C) (Oral)  Resp 16  Ht 1.765 m (5' 9.5\")  Wt 105.6 kg (232 lb 12.8 oz)  SpO2 99%  BMI 33.89 kg/m2      GEN: A & O, resting comfortably in bed, NAD, pleasant and conversational   HEENT: PERRL, no scleral icterus, mucus membranes moist  NECK: Supple, no LAD, JVP to ear lobe bilaterally   RESP: CTA bilaterally, no crackles,   CV: Regular, normal rate, distant S1 and S2 , 3/6 systolic murmur in the apex and left sternal border  ABD: Soft, distended, no HSM, +BS, no guarding  EXT: +2 pitting edema above the knees   NEURO: CN II-XII intact, no focal motor or sensory deficit     Data:  CMP  Recent Labs  Lab 03/29/17  1710      POTASSIUM 3.7   CHLORIDE 98   CO2 31   ANIONGAP 7   *   BUN 20   CR 0.94   GFRESTIMATED 81   GFRESTBLACK >90African American GFR Calc   ASHLEE 8.6   PROTTOTAL 8.0   ALBUMIN 3.6   BILITOTAL 2.3*   ALKPHOS 204*   AST 60*   ALT 99*     CBC  Recent Labs  Lab 03/29/17  1710   WBC 9.1   RBC 4.86   HGB 16.1   HCT 50.7   *   MCH 33.1*   MCHC 31.8   RDW 15.8*        INR  Recent Labs  Lab 03/29/17  1710   INR 1.20*     Arterial Blood GasNo lab results found in last 7 days.    Imaging Studies:  Cardiology TTE 3/22 OSH:  Markedly reduced left ventricular systolic function.  The ejection fraction is visually estimated to be 15%.  Mild aortic regurgitation.  Mild-to-moderate mitral " regurgitation.  Low normal right ventricular systolic function.  Pulmonary artery systolic pressure is measured at 37 mmHg.    Assessment and Plan  Mr. Danielito Chu is a 60 y/o M with pmh significant for HFrEF (last EF 15% ) 2/2 to unknown etiology (presumed non ischemic) , mild-moderate MR, pre-diabetes, former tobacco use (0.3 packs for 37 years), untreated sleep apnea (central and obstructive), HTN, hepatic congestion 2/2 CHF, who presents as a direct admission from [place], [place] for further CHF treatment and workup.      #HFrEF (last EF 15%) 2/2 unknown etiology (presumed NICM, possible distant history of viral myocarditis ?!)   #Mild-moderate MR  #HTN  The etiology of his HF is unclear. Patient was never worked for CAD - he will need angiography and right heart catheter to r/o CAD and better assess cardiac function. However, patient is grossly fluid overload on exam - will continue with aggressive diuresis and plan to do the previous along with TTE once the patient is optimized fluid status wise.   Will start afterload reduction.  Degree of MR/TR could be worsened by dilation off the heart in the setting of fluid overload will repeat TTE once dry.     - Lasix gtt 10mg/hr  - Start PTA Ramipril 2.5 mg qday  - Would consider adding Hydralazine + Isordil for further afterload reduction if BP is stable  - Continue ASA 81 daily  - Continue digoxin 0.25mg daily  - Continue Drop daily Toprol XL to 37.5 given active CHF exacerbation  - Continue simvastatin 20mg daily  - Strict I/O's, daily weights, cardiac diet  - TSH elevated will check FT4  - Normal Iron panel for other causes of HFrEF   - HIV testing pending     #Congestive hepatopathy  Likely 2/2 to above. However will do US to R/O cholethiasis / Choledocholethiasis given elevated ALP and D. Bili.   - Abdominal US   - Continue to trend LFTs    #AG metabolic acidosis  New since admission. AG border line 16 - VBG with normal Bicarb.   - Lactate pending    - Repeat BMP       Patient staffed with Dr. Watkins.    Prophylaxis:  DVT: Heparin 5000 U TID GI: None   Family: by bedside  Disposition: expected 5-7 day hospital stay    Code Status: Full    Patient was seen and discussed with attending physician Dr. June Dailey MD  Internal Medicine, PGY-1  Pager [phone]

## 2017-03-31 ENCOUNTER — APPOINTMENT (OUTPATIENT)
Dept: ULTRASOUND IMAGING | Facility: CLINIC | Age: 62
DRG: 222 | End: 2017-03-31
Attending: INTERNAL MEDICINE
Payer: COMMERCIAL

## 2017-03-31 LAB
ALBUMIN SERPL-MCNC: 3.1 G/DL (ref 3.4–5)
ALP SERPL-CCNC: 160 U/L (ref 40–150)
ALT SERPL W P-5'-P-CCNC: 75 U/L (ref 0–70)
ANION GAP SERPL CALCULATED.3IONS-SCNC: 11 MMOL/L (ref 3–14)
ANION GAP SERPL CALCULATED.3IONS-SCNC: 7 MMOL/L (ref 3–14)
ANION GAP SERPL CALCULATED.3IONS-SCNC: 9 MMOL/L (ref 3–14)
AST SERPL W P-5'-P-CCNC: 43 U/L (ref 0–45)
BILIRUB SERPL-MCNC: 1.5 MG/DL (ref 0.2–1.3)
BUN SERPL-MCNC: 16 MG/DL (ref 7–30)
BUN SERPL-MCNC: 19 MG/DL (ref 7–30)
BUN SERPL-MCNC: 19 MG/DL (ref 7–30)
CALCIUM SERPL-MCNC: 8.6 MG/DL (ref 8.5–10.1)
CALCIUM SERPL-MCNC: 8.6 MG/DL (ref 8.5–10.1)
CALCIUM SERPL-MCNC: 8.9 MG/DL (ref 8.5–10.1)
CHLORIDE SERPL-SCNC: 100 MMOL/L (ref 94–109)
CHLORIDE SERPL-SCNC: 97 MMOL/L (ref 94–109)
CHLORIDE SERPL-SCNC: 98 MMOL/L (ref 94–109)
CO2 SERPL-SCNC: 24 MMOL/L (ref 20–32)
CO2 SERPL-SCNC: 28 MMOL/L (ref 20–32)
CO2 SERPL-SCNC: 31 MMOL/L (ref 20–32)
CREAT SERPL-MCNC: 0.91 MG/DL (ref 0.66–1.25)
CREAT SERPL-MCNC: 0.96 MG/DL (ref 0.66–1.25)
CREAT SERPL-MCNC: 1.02 MG/DL (ref 0.66–1.25)
ERYTHROCYTE [DISTWIDTH] IN BLOOD BY AUTOMATED COUNT: 15.4 % (ref 10–15)
GFR SERPL CREATININE-BSD FRML MDRD: 74 ML/MIN/1.7M2
GFR SERPL CREATININE-BSD FRML MDRD: 80 ML/MIN/1.7M2
GFR SERPL CREATININE-BSD FRML MDRD: 85 ML/MIN/1.7M2
GLUCOSE SERPL-MCNC: 101 MG/DL (ref 70–99)
GLUCOSE SERPL-MCNC: 101 MG/DL (ref 70–99)
GLUCOSE SERPL-MCNC: 160 MG/DL (ref 70–99)
HCT VFR BLD AUTO: 47.2 % (ref 40–53)
HGB BLD-MCNC: 15 G/DL (ref 13.3–17.7)
MAGNESIUM SERPL-MCNC: 2.2 MG/DL (ref 1.6–2.3)
MCH RBC QN AUTO: 32.5 PG (ref 26.5–33)
MCHC RBC AUTO-ENTMCNC: 31.8 G/DL (ref 31.5–36.5)
MCV RBC AUTO: 102 FL (ref 78–100)
PLATELET # BLD AUTO: 232 10E9/L (ref 150–450)
POTASSIUM SERPL-SCNC: 3.3 MMOL/L (ref 3.4–5.3)
POTASSIUM SERPL-SCNC: 3.7 MMOL/L (ref 3.4–5.3)
POTASSIUM SERPL-SCNC: 3.8 MMOL/L (ref 3.4–5.3)
PROT SERPL-MCNC: 7.4 G/DL (ref 6.8–8.8)
RBC # BLD AUTO: 4.61 10E12/L (ref 4.4–5.9)
SODIUM SERPL-SCNC: 134 MMOL/L (ref 133–144)
SODIUM SERPL-SCNC: 136 MMOL/L (ref 133–144)
SODIUM SERPL-SCNC: 136 MMOL/L (ref 133–144)
WBC # BLD AUTO: 7.7 10E9/L (ref 4–11)

## 2017-03-31 PROCEDURE — 36415 COLL VENOUS BLD VENIPUNCTURE: CPT | Performed by: INTERNAL MEDICINE

## 2017-03-31 PROCEDURE — 99233 SBSQ HOSP IP/OBS HIGH 50: CPT | Mod: GC | Performed by: INTERNAL MEDICINE

## 2017-03-31 PROCEDURE — 25000132 ZZH RX MED GY IP 250 OP 250 PS 637

## 2017-03-31 PROCEDURE — 80048 BASIC METABOLIC PNL TOTAL CA: CPT | Performed by: STUDENT IN AN ORGANIZED HEALTH CARE EDUCATION/TRAINING PROGRAM

## 2017-03-31 PROCEDURE — 21400006 ZZH R&B CCU INTERMEDIATE UMMC

## 2017-03-31 PROCEDURE — 25000132 ZZH RX MED GY IP 250 OP 250 PS 637: Performed by: STUDENT IN AN ORGANIZED HEALTH CARE EDUCATION/TRAINING PROGRAM

## 2017-03-31 PROCEDURE — 25000132 ZZH RX MED GY IP 250 OP 250 PS 637: Performed by: INTERNAL MEDICINE

## 2017-03-31 PROCEDURE — 25000128 H RX IP 250 OP 636: Performed by: STUDENT IN AN ORGANIZED HEALTH CARE EDUCATION/TRAINING PROGRAM

## 2017-03-31 PROCEDURE — 94660 CPAP INITIATION&MGMT: CPT

## 2017-03-31 PROCEDURE — 83735 ASSAY OF MAGNESIUM: CPT | Performed by: INTERNAL MEDICINE

## 2017-03-31 PROCEDURE — 40000275 ZZH STATISTIC RCP TIME EA 10 MIN

## 2017-03-31 PROCEDURE — 85027 COMPLETE CBC AUTOMATED: CPT | Performed by: STUDENT IN AN ORGANIZED HEALTH CARE EDUCATION/TRAINING PROGRAM

## 2017-03-31 PROCEDURE — 80053 COMPREHEN METABOLIC PANEL: CPT | Performed by: STUDENT IN AN ORGANIZED HEALTH CARE EDUCATION/TRAINING PROGRAM

## 2017-03-31 PROCEDURE — 80048 BASIC METABOLIC PNL TOTAL CA: CPT | Performed by: INTERNAL MEDICINE

## 2017-03-31 PROCEDURE — 76705 ECHO EXAM OF ABDOMEN: CPT | Mod: XS

## 2017-03-31 PROCEDURE — 36415 COLL VENOUS BLD VENIPUNCTURE: CPT | Performed by: STUDENT IN AN ORGANIZED HEALTH CARE EDUCATION/TRAINING PROGRAM

## 2017-03-31 RX ORDER — FUROSEMIDE 10 MG/ML
40 INJECTION INTRAMUSCULAR; INTRAVENOUS ONCE
Status: COMPLETED | OUTPATIENT
Start: 2017-03-31 | End: 2017-03-31

## 2017-03-31 RX ORDER — RAMIPRIL 2.5 MG/1
2.5 CAPSULE ORAL 2 TIMES DAILY
Status: DISCONTINUED | OUTPATIENT
Start: 2017-03-31 | End: 2017-04-01

## 2017-03-31 RX ORDER — POTASSIUM CHLORIDE 1500 MG/1
60 TABLET, EXTENDED RELEASE ORAL ONCE
Status: COMPLETED | OUTPATIENT
Start: 2017-03-31 | End: 2017-03-31

## 2017-03-31 RX ADMIN — DIGOXIN 250 MCG: 125 TABLET ORAL at 13:09

## 2017-03-31 RX ADMIN — SENNOSIDES AND DOCUSATE SODIUM 1 TABLET: 8.6; 5 TABLET ORAL at 08:22

## 2017-03-31 RX ADMIN — Medication 12.5 MG: at 20:35

## 2017-03-31 RX ADMIN — SIMVASTATIN 20 MG: 20 TABLET, FILM COATED ORAL at 20:35

## 2017-03-31 RX ADMIN — FUROSEMIDE 20 MG/HR: 10 INJECTION, SOLUTION INTRAMUSCULAR; INTRAVENOUS at 19:40

## 2017-03-31 RX ADMIN — Medication 12.5 MG: at 16:22

## 2017-03-31 RX ADMIN — METOPROLOL SUCCINATE 37.5 MG: 25 TABLET, EXTENDED RELEASE ORAL at 08:21

## 2017-03-31 RX ADMIN — SENNOSIDES AND DOCUSATE SODIUM 1 TABLET: 8.6; 5 TABLET ORAL at 20:35

## 2017-03-31 RX ADMIN — Medication 12.5 MG: at 13:09

## 2017-03-31 RX ADMIN — TRAZODONE HYDROCHLORIDE 50 MG: 50 TABLET ORAL at 23:45

## 2017-03-31 RX ADMIN — TRAZODONE HYDROCHLORIDE 50 MG: 50 TABLET ORAL at 00:19

## 2017-03-31 RX ADMIN — ASPIRIN 81 MG: 81 TABLET, COATED ORAL at 08:21

## 2017-03-31 RX ADMIN — POTASSIUM CHLORIDE 60 MEQ: 1500 TABLET, EXTENDED RELEASE ORAL at 23:01

## 2017-03-31 RX ADMIN — FUROSEMIDE 10 MG/HR: 10 INJECTION, SOLUTION INTRAMUSCULAR; INTRAVENOUS at 10:34

## 2017-03-31 RX ADMIN — Medication 12.5 MG: at 08:21

## 2017-03-31 RX ADMIN — RAMIPRIL 2.5 MG: 2.5 CAPSULE ORAL at 08:21

## 2017-03-31 RX ADMIN — ACETAMINOPHEN 650 MG: 325 TABLET, FILM COATED ORAL at 22:16

## 2017-03-31 RX ADMIN — FUROSEMIDE 40 MG: 10 INJECTION, SOLUTION INTRAVENOUS at 17:38

## 2017-03-31 RX ADMIN — RAMIPRIL 2.5 MG: 2.5 CAPSULE ORAL at 20:35

## 2017-03-31 ASSESSMENT — PAIN DESCRIPTION - DESCRIPTORS: DESCRIPTORS: CRAMPING

## 2017-03-31 NOTE — PROGRESS NOTES
"  [facility]  Cardiology I Service  Daily Note  3/30/2017      Event over the past 24 hours:  No acute events overnight.   Had cramps , diuretics were held     Hemodynamics:  HR 65 - 80  /75     INs and outs:  In 1.5 - Out 4.660 - Net -3.15  Net -425     Vitals:    03/29/17 1635 03/31/17 0655   Weight: 105.6 kg (232 lb 12.8 oz) 103.4 kg (227 lb 14.4 oz)       Pertinent Medications:  Drips:   Lasix gtt 10 mg/hr (held over night)    Other Meds:  [id number] mg  Digoxin 250 mcg  Hydralazine 12.5 (QID)  Metoprolol 37.5 mg qday  Ramipril 2.5 qday  Simvastatin 20 mg qam    Examination:  /75 (BP Location: Right arm)  Temp 97.6  F (36.4  C) (Oral)  Resp 18  Ht 1.765 m (5' 9.5\")  Wt 103.4 kg (227 lb 14.4 oz)  SpO2 97%  BMI 33.17 kg/m2      GEN: A & O, resting comfortably in bed, NAD, pleasant and conversational   HEENT: PERRL, no scleral icterus, mucus membranes moist  NECK: Supple, no LAD, JVP to mid - neck  RESP: CTA bilaterally, no crackles,   CV: Regular, normal rate, distant S1 and S2 , 3/6 systolic murmur in the apex and left sternal border  ABD: Soft, distended, no HSM, +BS, no guarding  EXT: +2 pitting edema above the knees   NEURO: CN II-XII intact, no focal motor or sensory deficit     Data:  CMP    Recent Labs  Lab 03/31/17  0003 03/30/17  1605 03/30/17  0724 03/29/17  1710    139 137 136   POTASSIUM 3.7 3.7 3.6 3.7   CHLORIDE 98 99 102 98   CO2 31 31 19* 31   ANIONGAP 7 9 16* 7   * 116* 95 111*   BUN 19 16 20 20   CR 1.02 1.01 0.86 0.94   GFRESTIMATED 74 75 >90Non  GFR Calc 81   GFRESTBLACK 90 >90African American GFR Calc >90African American GFR Calc >90African American GFR Calc   ASHLEE 8.9 8.6 8.8 8.6   MAG 2.2 2.3 2.5*  --    PROTTOTAL  --   --  7.2 8.0   ALBUMIN  --   --  3.2* 3.6   BILITOTAL  --   --  1.9* 2.3*   ALKPHOS  --   --  174* 204*   AST  --   --  52* 60*   ALT  --   --  81* 99*     CBC    Recent Labs  Lab " 03/29/17  1710   WBC 9.1   RBC 4.86   HGB 16.1   HCT 50.7   *   MCH 33.1*   MCHC 31.8   RDW 15.8*        INR    Recent Labs  Lab 03/29/17  1710   INR 1.20*     Arterial Blood Gas    Recent Labs  Lab 03/30/17  1230   O2PER 21%       Imaging Studies:  Cardiology TTE 3/22 OSH:  Markedly reduced left ventricular systolic function.  The ejection fraction is visually estimated to be 15%.  Mild aortic regurgitation.  Mild-to-moderate mitral regurgitation.  Low normal right ventricular systolic function.  Pulmonary artery systolic pressure is measured at 37 mmHg.    Assessment and Plan  Mr. Danielito Chu is a 60 y/o M with pmh significant for HFrEF (last EF 15% ) 2/2 to unknown etiology (presumed non ischemic) , mild-moderate MR, pre-diabetes, former tobacco use (0.3 packs for 37 years), untreated sleep apnea (central and obstructive), HTN, hepatic congestion 2/2 CHF, who presents as a direct admission from [place], [place] for further CHF treatment and workup.      #HFrEF (last EF 15%) 2/2 unknown etiology (presumed NICM, possible distant history of viral myocarditis ?!)   #Mild-moderate MR  #HTN  The etiology of his HF is unclear. Patient was never worked for CAD - he will need angiography and right heart catheter to r/o CAD and better assess cardiac function. However, patient is grossly fluid overload on exam - will continue with aggressive diuresis and plan to do the previous along with TTE once the patient is optimized fluid status wise.     Exam improved with diuresis -- kidney function stable will continue with aggressive IV diuresis .   Increase afterload reduction (plan to reach goal directed therapy if tolerated , spiono/ACE/b-blocker)    - Lasix gtt 10mg/hr  - Increase Ramipril 2.5 mg BID   - Continue Hydralazine 12.5 mg QID   - Continue ASA 81 daily  - Continue digoxin 0.25mg daily  - Continue daily Toprol XL to 37.5 given active CHF exacerbation  - Continue simvastatin 20mg daily  - Strict I/O's, daily  weights, cardiac diet  - TSH elevated will check FT4  - Normal Iron panel for other causes of HFrEF   - HIV testing pending     #Congestive hepatopathy  Likely 2/2 to above, LFTs improving with diuresis .   - Abdominal US negative   - Continue to trend LFTs    #AG metabolic acidosis, resolved        Patient staffed with Dr. Watkins.    Prophylaxis:  DVT: Ambulate x3 TID GI: None   Family: by bedside  Disposition: expected 5-7 day hospital stay    Code Status: Full    Patient was seen and discussed with attending physician Dr. June Dailey MD  Internal Medicine, PGY-1  Pager [phone]

## 2017-03-31 NOTE — PLAN OF CARE
Problem: Goal Outcome Summary  Goal: Goal Outcome Summary  Outcome: No Change  D: Worsening Heart Failure. Admitted from [place] for further treatment and workup of HF.      I:CPAP while sleeping, pt wore for a few hours then switched to 2L NC due to dryness and irritation of CPAP mask, tolerated 2L NC well. Lasix gtt DC'd at 2335 due to significant muscle cramps.      A:A/Ox4. Denies pain. Lungs- diminished in bases, SOB on exertion. RA while awake. SR with rare PVCs, 70s-80s. Active bowel sounds. Adequate urine output but decreased after lasix gtt was stopped. PIV. Up independently in room. Walks frequently around room and unit. +2 edema BLE.      P: Nutrition consult to answer pt's questions and get information to help with the changing of eating habits. Continue to diurese. RHC after diuretic therapy. Continue to monitor and assess with reports of concerns to Cards 2 team.      Patricia Evans RN 03/31/17 8:28 AM     Hours of Care [phone]

## 2017-03-31 NOTE — PROGRESS NOTES
"CLINICAL NUTRITION SERVICES    Reason for Assessment:  Low-sodium (2 g/day) nutrition education, received pt/family consult for \"New heart failure patient, lots of questions surroudning foods/beverages\"    Diet History:  Pt reports history of receiving low-sodium nutrition education in the past. States he does not routinely add salt to foods. Aware of some of the foods high in sodium. His brother was in his room and is supportive and helpful in finding low-sodium options and snacks. Also, his sister has been supportive of low-sodium eating.     Nutrition Diagnosis:  Food- and nutrition-related knowledge deficit r/t no previous knowledge of low-sodium diet AEB pt report of no previous formal low-sodium nutrition education.    Nutrition Prescription/Recs:  Rec low-sodium diet. Fluid restriction as per team.     Interventions:  Nutrition Education (pt and brother).   1.  Provided verbal instruction on a heart-healthy diet with emphasis on low-sodium meal planning. Discussed high sodium foods to limit and low-sodium foods to choose. Showed him label reading. Verbalized low-sodium meal plan tips.   2.  Provided the following handouts:  How to Read Nutrition Labels, Low-Sodium Foods and Drinks, Tips for a Low-Sodium Diet, Seasoning Your Foods Without Adding Salt, and Managing Fluid Restriction.  Also, provided a Low Sodium Recipes booklet, Heart-Healthy Recipes booklet, Nutrition Care Manual handout on the sodium amounts in foods, and a list of low-sodium website he may be interested in.     Goals:    Pt will verbalize at least five high sodium foods and the importance of avoiding added salt to foods for cooking or seasoning foods.     Follow-up:   Patient to ask any further nutrition-related questions before discharge.  In addition, pt may request outpatient RD appointment.     Rachel Bradshaw, MS, RD, LD, [facility]   6C Pgr:  [phone]   "

## 2017-03-31 NOTE — PLAN OF CARE
Problem: Goal Outcome Summary  Goal: Goal Outcome Summary  Outcome: No Change  VSS, SR 60s-90s.  No complaints of pain.  RA during the day.  2 L NC at night.  Abdominal ultrasound complete.  Lasix gtt at 20 ml/hr, restarted at 1030.  40 mg IV lasix at 1730.  Good UO.  Pt SBA/independent in room and ambulating in the hallway.  Activity encouraged.  Plan for RHC after further diuresis.  Will continue to monitor and notify Cards 2 with any concerns or questions.

## 2017-04-01 LAB
ALBUMIN SERPL-MCNC: 3.6 G/DL (ref 3.4–5)
ALP SERPL-CCNC: 164 U/L (ref 40–150)
ALT SERPL W P-5'-P-CCNC: 71 U/L (ref 0–70)
ANION GAP SERPL CALCULATED.3IONS-SCNC: 11 MMOL/L (ref 3–14)
ANION GAP SERPL CALCULATED.3IONS-SCNC: 11 MMOL/L (ref 3–14)
AST SERPL W P-5'-P-CCNC: 42 U/L (ref 0–45)
BILIRUB SERPL-MCNC: 1.5 MG/DL (ref 0.2–1.3)
BUN SERPL-MCNC: 16 MG/DL (ref 7–30)
BUN SERPL-MCNC: 17 MG/DL (ref 7–30)
CALCIUM SERPL-MCNC: 8 MG/DL (ref 8.5–10.1)
CALCIUM SERPL-MCNC: 9.4 MG/DL (ref 8.5–10.1)
CHLORIDE SERPL-SCNC: 100 MMOL/L (ref 94–109)
CHLORIDE SERPL-SCNC: 97 MMOL/L (ref 94–109)
CO2 SERPL-SCNC: 24 MMOL/L (ref 20–32)
CO2 SERPL-SCNC: 24 MMOL/L (ref 20–32)
CREAT SERPL-MCNC: 0.84 MG/DL (ref 0.66–1.25)
CREAT SERPL-MCNC: 0.85 MG/DL (ref 0.66–1.25)
ERYTHROCYTE [DISTWIDTH] IN BLOOD BY AUTOMATED COUNT: 15.1 % (ref 10–15)
GFR SERPL CREATININE-BSD FRML MDRD: ABNORMAL ML/MIN/1.7M2
GFR SERPL CREATININE-BSD FRML MDRD: ABNORMAL ML/MIN/1.7M2
GLUCOSE SERPL-MCNC: 112 MG/DL (ref 70–99)
GLUCOSE SERPL-MCNC: 137 MG/DL (ref 70–99)
HCT VFR BLD AUTO: 51.1 % (ref 40–53)
HGB BLD-MCNC: 16.6 G/DL (ref 13.3–17.7)
MCH RBC QN AUTO: 32.7 PG (ref 26.5–33)
MCHC RBC AUTO-ENTMCNC: 32.5 G/DL (ref 31.5–36.5)
MCV RBC AUTO: 101 FL (ref 78–100)
PLATELET # BLD AUTO: 260 10E9/L (ref 150–450)
POTASSIUM SERPL-SCNC: 3.5 MMOL/L (ref 3.4–5.3)
POTASSIUM SERPL-SCNC: 3.8 MMOL/L (ref 3.4–5.3)
PROT SERPL-MCNC: 8.4 G/DL (ref 6.8–8.8)
RBC # BLD AUTO: 5.08 10E12/L (ref 4.4–5.9)
SODIUM SERPL-SCNC: 132 MMOL/L (ref 133–144)
SODIUM SERPL-SCNC: 135 MMOL/L (ref 133–144)
WBC # BLD AUTO: 8.8 10E9/L (ref 4–11)

## 2017-04-01 PROCEDURE — 80053 COMPREHEN METABOLIC PANEL: CPT | Performed by: STUDENT IN AN ORGANIZED HEALTH CARE EDUCATION/TRAINING PROGRAM

## 2017-04-01 PROCEDURE — 36415 COLL VENOUS BLD VENIPUNCTURE: CPT | Performed by: STUDENT IN AN ORGANIZED HEALTH CARE EDUCATION/TRAINING PROGRAM

## 2017-04-01 PROCEDURE — 25000132 ZZH RX MED GY IP 250 OP 250 PS 637: Performed by: INTERNAL MEDICINE

## 2017-04-01 PROCEDURE — 80048 BASIC METABOLIC PNL TOTAL CA: CPT | Performed by: STUDENT IN AN ORGANIZED HEALTH CARE EDUCATION/TRAINING PROGRAM

## 2017-04-01 PROCEDURE — 99233 SBSQ HOSP IP/OBS HIGH 50: CPT | Mod: GC | Performed by: INTERNAL MEDICINE

## 2017-04-01 PROCEDURE — 21400006 ZZH R&B CCU INTERMEDIATE UMMC

## 2017-04-01 PROCEDURE — 25000128 H RX IP 250 OP 636: Performed by: STUDENT IN AN ORGANIZED HEALTH CARE EDUCATION/TRAINING PROGRAM

## 2017-04-01 PROCEDURE — 25000132 ZZH RX MED GY IP 250 OP 250 PS 637: Performed by: STUDENT IN AN ORGANIZED HEALTH CARE EDUCATION/TRAINING PROGRAM

## 2017-04-01 PROCEDURE — 84132 ASSAY OF SERUM POTASSIUM: CPT | Performed by: INTERNAL MEDICINE

## 2017-04-01 PROCEDURE — 25000132 ZZH RX MED GY IP 250 OP 250 PS 637

## 2017-04-01 PROCEDURE — 85027 COMPLETE CBC AUTOMATED: CPT | Performed by: STUDENT IN AN ORGANIZED HEALTH CARE EDUCATION/TRAINING PROGRAM

## 2017-04-01 RX ORDER — POTASSIUM CHLORIDE 29.8 MG/ML
20 INJECTION INTRAVENOUS
Status: DISCONTINUED | OUTPATIENT
Start: 2017-04-01 | End: 2017-04-08 | Stop reason: HOSPADM

## 2017-04-01 RX ORDER — POTASSIUM CHLORIDE 7.45 MG/ML
10 INJECTION INTRAVENOUS
Status: DISCONTINUED | OUTPATIENT
Start: 2017-04-01 | End: 2017-04-08 | Stop reason: HOSPADM

## 2017-04-01 RX ORDER — POTASSIUM CHLORIDE 750 MG/1
20-40 TABLET, EXTENDED RELEASE ORAL
Status: DISCONTINUED | OUTPATIENT
Start: 2017-04-01 | End: 2017-04-08 | Stop reason: HOSPADM

## 2017-04-01 RX ORDER — POTASSIUM CHLORIDE 1.5 G/1.58G
20-40 POWDER, FOR SOLUTION ORAL
Status: DISCONTINUED | OUTPATIENT
Start: 2017-04-01 | End: 2017-04-08 | Stop reason: HOSPADM

## 2017-04-01 RX ORDER — SPIRONOLACTONE 25 MG/1
25 TABLET ORAL DAILY
Status: DISCONTINUED | OUTPATIENT
Start: 2017-04-01 | End: 2017-04-08 | Stop reason: HOSPADM

## 2017-04-01 RX ORDER — RAMIPRIL 2.5 MG/1
2.5 CAPSULE ORAL DAILY
Status: DISCONTINUED | OUTPATIENT
Start: 2017-04-02 | End: 2017-04-02

## 2017-04-01 RX ORDER — RAMIPRIL 2.5 MG/1
5 CAPSULE ORAL AT BEDTIME
Status: DISCONTINUED | OUTPATIENT
Start: 2017-04-01 | End: 2017-04-02

## 2017-04-01 RX ADMIN — ACETAMINOPHEN 650 MG: 325 TABLET, FILM COATED ORAL at 05:16

## 2017-04-01 RX ADMIN — FUROSEMIDE 20 MG/HR: 10 INJECTION, SOLUTION INTRAMUSCULAR; INTRAVENOUS at 21:26

## 2017-04-01 RX ADMIN — FUROSEMIDE 20 MG/HR: 10 INJECTION, SOLUTION INTRAMUSCULAR; INTRAVENOUS at 16:09

## 2017-04-01 RX ADMIN — ASPIRIN 81 MG: 81 TABLET, COATED ORAL at 07:59

## 2017-04-01 RX ADMIN — RAMIPRIL 2.5 MG: 2.5 CAPSULE ORAL at 07:59

## 2017-04-01 RX ADMIN — FUROSEMIDE 20 MG/HR: 10 INJECTION, SOLUTION INTRAMUSCULAR; INTRAVENOUS at 11:28

## 2017-04-01 RX ADMIN — Medication 12.5 MG: at 11:28

## 2017-04-01 RX ADMIN — POTASSIUM CHLORIDE 20 MEQ: 750 TABLET, EXTENDED RELEASE ORAL at 12:45

## 2017-04-01 RX ADMIN — POTASSIUM CHLORIDE 20 MEQ: 750 TABLET, EXTENDED RELEASE ORAL at 16:51

## 2017-04-01 RX ADMIN — SIMVASTATIN 20 MG: 20 TABLET, FILM COATED ORAL at 20:19

## 2017-04-01 RX ADMIN — FUROSEMIDE 20 MG/HR: 10 INJECTION, SOLUTION INTRAMUSCULAR; INTRAVENOUS at 01:20

## 2017-04-01 RX ADMIN — METOPROLOL SUCCINATE 37.5 MG: 25 TABLET, EXTENDED RELEASE ORAL at 08:00

## 2017-04-01 RX ADMIN — POTASSIUM CHLORIDE 20 MEQ: 750 TABLET, EXTENDED RELEASE ORAL at 16:36

## 2017-04-01 RX ADMIN — FUROSEMIDE 20 MG/HR: 10 INJECTION, SOLUTION INTRAMUSCULAR; INTRAVENOUS at 06:47

## 2017-04-01 RX ADMIN — SENNOSIDES AND DOCUSATE SODIUM 1 TABLET: 8.6; 5 TABLET ORAL at 07:59

## 2017-04-01 RX ADMIN — SENNOSIDES AND DOCUSATE SODIUM 2 TABLET: 8.6; 5 TABLET ORAL at 20:19

## 2017-04-01 RX ADMIN — Medication 12.5 MG: at 08:00

## 2017-04-01 RX ADMIN — SPIRONOLACTONE 25 MG: 25 TABLET ORAL at 12:46

## 2017-04-01 RX ADMIN — RAMIPRIL 5 MG: 2.5 CAPSULE ORAL at 20:19

## 2017-04-01 RX ADMIN — ACETAMINOPHEN 650 MG: 325 TABLET, FILM COATED ORAL at 12:08

## 2017-04-01 RX ADMIN — DIGOXIN 250 MCG: 125 TABLET ORAL at 14:33

## 2017-04-01 NOTE — PLAN OF CARE
Problem: Goal Outcome Summary  Goal: Goal Outcome Summary  Outcome: No Change  VSS, SR-ST 60s-110s.  Intermittent leg cramps made better by PRN tylenol and potassium replacement.  Lasix gtt at 20 ml/hr.  Potassium (3.5) replaced with 40 mEq per MD verbal request.  Pt up ad moshe, ambulating in the hallway with family.  Plan for coronary angiography on Monday.  Will continue to monitor and notify Cards 2 with any concerns or questions.

## 2017-04-01 NOTE — SIGNIFICANT EVENT
Ordered coronary angiography for Mon 4/3/17. Decide that day about RHC (not yet ordered). Consent placed in chart.    Primo Lindquist  Cardiology Fellow  [phone]

## 2017-04-01 NOTE — PROGRESS NOTES
"  [facility] acute hospital  Cardiology I Service  Daily Note  3/30/2017      Event over the past 24 hours:  No acute events overnight.     Hemodynamics:  HR 65 - 8=1  /73    INs and outs:  In 1.7 - Out 3000 - Net -1.2  Net -1.5     Vitals:    03/29/17 1635 03/31/17 0655 04/01/17 0514   Weight: 105.6 kg (232 lb 12.8 oz) 103.4 kg (227 lb 14.4 oz) 101 kg (222 lb 11.2 oz)       Pertinent Medications:  Drips:   Lasix gtt 20 mg/hr     Other Meds:  [id number] mg  Digoxin 250 mcg  Hydralazine 12.5 (QID)  Metoprolol 37.5 mg qday  Ramipril 2.5 qday  Simvastatin 20 mg qam    Examination:  /73 (BP Location: Right arm)  Temp 97.6  F (36.4  C) (Oral)  Resp 22  Ht 1.765 m (5' 9.5\")  Wt 101 kg (222 lb 11.2 oz)  SpO2 98%  BMI 32.42 kg/m2      GEN: A & O, resting comfortably in bed, NAD, pleasant and conversational   HEENT: PERRL, no scleral icterus, mucus membranes moist  NECK: Supple, no LAD, JVP to mid - neck (stable)  RESP: CTA bilaterally, no crackles,   CV: Regular, normal rate, distant S1 and S2 , 3/6 systolic murmur in the apex and left sternal border  ABD: Soft, distended, no HSM, +BS, no guarding  EXT: +2 pitting edema above the knees   NEURO: CN II-XII intact, no focal motor or sensory deficit     Data:  CMP    Recent Labs  Lab 04/01/17  0512 03/31/17  2105 03/31/17  0710 03/31/17  0003 03/30/17  1605 03/30/17  0724 03/29/17  1710   * 134 136 136 139 137 136   POTASSIUM 3.8 3.3* 3.8 3.7 3.7 3.6 3.7   CHLORIDE 97 97 100 98 99 102 98   CO2 24 28 24 31 31 19* 31   ANIONGAP 11 9 11 7 9 16* 7   * 160* 101* 101* 116* 95 111*   BUN 17 16 19 19 16 20 20   CR 0.85 0.91 0.96 1.02 1.01 0.86 0.94   GFRESTIMATED >90Non  GFR Calc 85 80 74 75 >90Non  GFR Calc 81   GFRESTBLACK >90African American GFR Calc >90African American GFR Calc >90African American GFR Calc 90 >90African American GFR Calc >90African American GFR Calc >90African American GFR Calc "   ASHLEE 9.4 8.6 8.6 8.9 8.6 8.8 8.6   MAG  --   --   --  2.2 2.3 2.5*  --    PROTTOTAL 8.4  --  7.4  --   --  7.2 8.0   ALBUMIN 3.6  --  3.1*  --   --  3.2* 3.6   BILITOTAL 1.5*  --  1.5*  --   --  1.9* 2.3*   ALKPHOS 164*  --  160*  --   --  174* 204*   AST 42  --  43  --   --  52* 60*   ALT 71*  --  75*  --   --  81* 99*     CBC    Recent Labs  Lab 04/01/17  0512 03/31/17  0710 03/29/17  1710   WBC 8.8 7.7 9.1   RBC 5.08 4.61 4.86   HGB 16.6 15.0 16.1   HCT 51.1 47.2 50.7   * 102* 104*   MCH 32.7 32.5 33.1*   MCHC 32.5 31.8 31.8   RDW 15.1* 15.4* 15.8*    232 217     INR    Recent Labs  Lab 03/29/17  1710   INR 1.20*     Arterial Blood Gas    Recent Labs  Lab 03/30/17  1230   O2PER 21%       Imaging Studies:  Cardiology TTE 3/22 OSH:  Markedly reduced left ventricular systolic function.  The ejection fraction is visually estimated to be 15%.  Mild aortic regurgitation.  Mild-to-moderate mitral regurgitation.  Low normal right ventricular systolic function.  Pulmonary artery systolic pressure is measured at 37 mmHg.    Assessment and Plan  Mr. Danielito Chu is a 60 y/o M with pmh significant for HFrEF (last EF 15% ) 2/2 to unknown etiology (presumed non ischemic) , mild-moderate MR, pre-diabetes, former tobacco use (0.3 packs for 37 years), untreated sleep apnea (central and obstructive), HTN, hepatic congestion 2/2 CHF, who presents as a direct admission from [place], [place] for further CHF treatment and workup.      #HFrEF (last EF 15%) 2/2 unknown etiology (presumed NICM, possible distant history of viral myocarditis ?!)   #Mild-moderate MR  #HTN  The etiology of his HF is unclear. Patient was never worked for CAD - he will need angiography and right heart catheter to r/o CAD and better assess cardiac function. However, patient is grossly fluid overload on exam - will continue with aggressive diuresis and plan to do the previous along with TTE once the patient is optimized fluid status wise.     Exam  continues improved with diuresis -- patient is having prompt response to medical therapy will increase optimize medication towards goal directed therapy as below.     - Lasix gtt 10mg/hr  - Increase Ramipril 5 mg Qhs/ 2.5 qam  - Start Spironolactone 25 mg qday   - Hold Hydralazine 12.5 mg QID   - Continue ASA 81 daily  - Continue digoxin 0.25mg daily  - Continue daily Toprol XL to 37.5 given active CHF exacerbation  - Continue simvastatin 20mg daily  - Strict I/O's, daily weights, cardiac diet  - TSH elevated will check FT4  - Normal Iron panel for other causes of HFrEF   - HIV testing pending     #Congestive hepatopathy  Likely 2/2 to above, LFTs improving with diuresis .   - Abdominal US negative   - Continue to trend LFTs    #AG metabolic acidosis, resolved      Patient staffed with Dr. Watkins.    Prophylaxis:  DVT: Ambulate x3 TID GI: None   Family: by bedside  Disposition: expected 5-7 day hospital stay    Code Status: Full    Patient was seen and discussed with attending physician Dr. June Dailey MD  Internal Medicine, PGY-1  Pager [phone]

## 2017-04-01 NOTE — PLAN OF CARE
"Problem: Goal Outcome Summary  Goal: Goal Outcome Summary  Outcome: No Change  D: Worsening Heart Failure. Admitted from [place] for further treatment and workup of HF.       I:CPAP while asleep. Lasix gtt 20mg/hr, with good urine output. Pt reporting \"Charley [place]orses\" and significant leg cramps- MDs aware, K+ level drawn, replacedx1 with 60mEq. Heat applied and tylenol given.       A:A/Ox4. Denies pain except for cramps (at times) . Lungs- diminished in bases, SOB on exertion. RA while awake. SR with rare PVCs, 60s-80s. Active bowel sounds. Good urine output. PIV. Up independently in room. Walks frequently around room. +2 edema BLE.   Temp:  [97.1  F (36.2  C)-98  F (36.7  C)] 97.9  F (36.6  C)  Heart Rate:  [68-86] 68  Resp:  [16-22] 22  BP: ()/(62-78) 112/74  SpO2:  [96 %-99 %] 99 %     P: Address PRN Potassium replacement protocol. Continue to diurese. RHC after diuretic therapy. Continue to monitor and assess with reports of concerns to Cards 2 team.      Patricia Evans RN 04/01/17 5:53 AM     Hours of Care [phone]          "

## 2017-04-02 LAB
ALBUMIN SERPL-MCNC: 3.6 G/DL (ref 3.4–5)
ALP SERPL-CCNC: 148 U/L (ref 40–150)
ALT SERPL W P-5'-P-CCNC: 62 U/L (ref 0–70)
ANION GAP SERPL CALCULATED.3IONS-SCNC: 13 MMOL/L (ref 3–14)
ANION GAP SERPL CALCULATED.3IONS-SCNC: 9 MMOL/L (ref 3–14)
AST SERPL W P-5'-P-CCNC: 37 U/L (ref 0–45)
BILIRUB SERPL-MCNC: 1.7 MG/DL (ref 0.2–1.3)
BUN SERPL-MCNC: 16 MG/DL (ref 7–30)
BUN SERPL-MCNC: 18 MG/DL (ref 7–30)
CALCIUM SERPL-MCNC: 8.6 MG/DL (ref 8.5–10.1)
CALCIUM SERPL-MCNC: 9 MG/DL (ref 8.5–10.1)
CHLORIDE SERPL-SCNC: 91 MMOL/L (ref 94–109)
CHLORIDE SERPL-SCNC: 95 MMOL/L (ref 94–109)
CO2 SERPL-SCNC: 23 MMOL/L (ref 20–32)
CO2 SERPL-SCNC: 28 MMOL/L (ref 20–32)
CREAT SERPL-MCNC: 0.93 MG/DL (ref 0.66–1.25)
CREAT SERPL-MCNC: 1 MG/DL (ref 0.66–1.25)
ERYTHROCYTE [DISTWIDTH] IN BLOOD BY AUTOMATED COUNT: 15 % (ref 10–15)
GFR SERPL CREATININE-BSD FRML MDRD: 76 ML/MIN/1.7M2
GFR SERPL CREATININE-BSD FRML MDRD: 83 ML/MIN/1.7M2
GLUCOSE SERPL-MCNC: 119 MG/DL (ref 70–99)
GLUCOSE SERPL-MCNC: 158 MG/DL (ref 70–99)
HCT VFR BLD AUTO: 53.4 % (ref 40–53)
HGB BLD-MCNC: 17.3 G/DL (ref 13.3–17.7)
MCH RBC QN AUTO: 32.8 PG (ref 26.5–33)
MCHC RBC AUTO-ENTMCNC: 32.4 G/DL (ref 31.5–36.5)
MCV RBC AUTO: 101 FL (ref 78–100)
PLATELET # BLD AUTO: 276 10E9/L (ref 150–450)
POTASSIUM SERPL-SCNC: 3.8 MMOL/L (ref 3.4–5.3)
POTASSIUM SERPL-SCNC: 4.2 MMOL/L (ref 3.4–5.3)
PROT SERPL-MCNC: 8.3 G/DL (ref 6.8–8.8)
RBC # BLD AUTO: 5.27 10E12/L (ref 4.4–5.9)
SODIUM SERPL-SCNC: 129 MMOL/L (ref 133–144)
SODIUM SERPL-SCNC: 131 MMOL/L (ref 133–144)
WBC # BLD AUTO: 8.9 10E9/L (ref 4–11)

## 2017-04-02 PROCEDURE — 99232 SBSQ HOSP IP/OBS MODERATE 35: CPT | Mod: GC | Performed by: INTERNAL MEDICINE

## 2017-04-02 PROCEDURE — 36415 COLL VENOUS BLD VENIPUNCTURE: CPT | Performed by: STUDENT IN AN ORGANIZED HEALTH CARE EDUCATION/TRAINING PROGRAM

## 2017-04-02 PROCEDURE — 80048 BASIC METABOLIC PNL TOTAL CA: CPT | Performed by: STUDENT IN AN ORGANIZED HEALTH CARE EDUCATION/TRAINING PROGRAM

## 2017-04-02 PROCEDURE — 85027 COMPLETE CBC AUTOMATED: CPT | Performed by: STUDENT IN AN ORGANIZED HEALTH CARE EDUCATION/TRAINING PROGRAM

## 2017-04-02 PROCEDURE — 40000141 ZZH STATISTIC PERIPHERAL IV START W/O US GUIDANCE

## 2017-04-02 PROCEDURE — 80053 COMPREHEN METABOLIC PANEL: CPT | Performed by: STUDENT IN AN ORGANIZED HEALTH CARE EDUCATION/TRAINING PROGRAM

## 2017-04-02 PROCEDURE — 25000128 H RX IP 250 OP 636: Performed by: STUDENT IN AN ORGANIZED HEALTH CARE EDUCATION/TRAINING PROGRAM

## 2017-04-02 PROCEDURE — 25000132 ZZH RX MED GY IP 250 OP 250 PS 637: Performed by: INTERNAL MEDICINE

## 2017-04-02 PROCEDURE — 25000132 ZZH RX MED GY IP 250 OP 250 PS 637: Performed by: STUDENT IN AN ORGANIZED HEALTH CARE EDUCATION/TRAINING PROGRAM

## 2017-04-02 PROCEDURE — 21400006 ZZH R&B CCU INTERMEDIATE UMMC

## 2017-04-02 RX ORDER — RAMIPRIL 2.5 MG/1
2.5 CAPSULE ORAL ONCE
Status: COMPLETED | OUTPATIENT
Start: 2017-04-02 | End: 2017-04-02

## 2017-04-02 RX ORDER — RAMIPRIL 5 MG/1
5 CAPSULE ORAL 2 TIMES DAILY
Status: DISCONTINUED | OUTPATIENT
Start: 2017-04-02 | End: 2017-04-04

## 2017-04-02 RX ADMIN — ACETAMINOPHEN 650 MG: 325 TABLET, FILM COATED ORAL at 13:43

## 2017-04-02 RX ADMIN — RAMIPRIL 2.5 MG: 2.5 CAPSULE ORAL at 07:43

## 2017-04-02 RX ADMIN — SENNOSIDES AND DOCUSATE SODIUM 1 TABLET: 8.6; 5 TABLET ORAL at 19:33

## 2017-04-02 RX ADMIN — FUROSEMIDE 20 MG/HR: 10 INJECTION, SOLUTION INTRAMUSCULAR; INTRAVENOUS at 04:05

## 2017-04-02 RX ADMIN — ASPIRIN 81 MG: 81 TABLET, COATED ORAL at 07:43

## 2017-04-02 RX ADMIN — METOPROLOL SUCCINATE 37.5 MG: 25 TABLET, EXTENDED RELEASE ORAL at 09:31

## 2017-04-02 RX ADMIN — RAMIPRIL 5 MG: 5 CAPSULE ORAL at 19:33

## 2017-04-02 RX ADMIN — FUROSEMIDE 10 MG/HR: 10 INJECTION, SOLUTION INTRAMUSCULAR; INTRAVENOUS at 16:51

## 2017-04-02 RX ADMIN — SENNOSIDES AND DOCUSATE SODIUM 1 TABLET: 8.6; 5 TABLET ORAL at 07:43

## 2017-04-02 RX ADMIN — POTASSIUM CHLORIDE 20 MEQ: 750 TABLET, EXTENDED RELEASE ORAL at 09:00

## 2017-04-02 RX ADMIN — SPIRONOLACTONE 25 MG: 25 TABLET ORAL at 07:43

## 2017-04-02 RX ADMIN — DIGOXIN 250 MCG: 125 TABLET ORAL at 13:43

## 2017-04-02 RX ADMIN — Medication 1 LOZENGE: at 16:50

## 2017-04-02 RX ADMIN — RAMIPRIL 2.5 MG: 2.5 CAPSULE ORAL at 11:52

## 2017-04-02 RX ADMIN — SIMVASTATIN 20 MG: 20 TABLET, FILM COATED ORAL at 19:33

## 2017-04-02 RX ADMIN — Medication 1 LOZENGE: at 13:43

## 2017-04-02 NOTE — PLAN OF CARE
"Problem: Goal Outcome Summary  Goal: Goal Outcome Summary  Outcome: No Change  /71 (BP Location: Left arm)  Pulse 77  Temp 97.8  F (36.6  C) (Oral)  Resp 20  Ht 1.765 m (5' 9.5\")  Wt 101.4 kg (223 lb 9.6 oz)  SpO2 99%  BMI 32.55 kg/m2  Pt's AVSS, continue to be SR/ST with HR in the 's. Maintaining sat's in the upper 90's while on BIPAP during the night. Pt also sat's in the upper 90's while on RS during the day. Denied pain all night, abd distended and voiding good amount via bedside urinal. Lasix gtt infusing at 20ml/h. Skin intact with multiple bruising. Plan for an Angio on 4/3 per MD done. Keep monitoring pt as ordered and notify MD with any new changes.       "

## 2017-04-02 NOTE — PROGRESS NOTES
".arc    [facility]  Cardiology I Service  Daily Note  4/2/2017    Event over the past 24 hours:  No acute events overnight.     Hemodynamics:  HR 77 -74  /72 (83)     INs and outs:  In 1.4 - Out 3.4 - Net -2L  Net -1.4    Vitals:    03/31/17 0655 04/01/17 0514 04/02/17 0024   Weight: 103.4 kg (227 lb 14.4 oz) 101 kg (222 lb 11.2 oz) 101.4 kg (223 lb 9.6 oz)       Pertinent Medications:  Drips:   Lasix gtt 20 mg/hr     Other Meds:  [id number] mg  Digoxin 250 mcg  Metoprolol 37.5 mg qday  Ramipril  5 mg BID  Simvastatin 20 mg qam  Spironolactone 25 mg qday    Examination:  /71 (BP Location: Left arm)  Pulse 77  Temp 97.8  F (36.6  C) (Oral)  Resp 20  Ht 1.765 m (5' 9.5\")  Wt 101.4 kg (223 lb 9.6 oz)  SpO2 99%  BMI 32.55 kg/m2      GEN: A & O, resting comfortably in bed, NAD, pleasant and conversational   HEENT: PERRL, no scleral icterus, mucus membranes moist  NECK: Supple, no LAD, JVP 3-4 cm above clavicle (lower)  RESP: CTA bilaterally, no crackles,   CV: Regular, normal rate, distant S1 and S2 , 3/6 systolic murmur in the apex and left sternal border  ABD: Soft, distended, no HSM, +BS, no guarding   EXT: +2 pitting edema above the knees   NEURO: CN II-XII intact, no focal motor or sensory deficit     Data:  CMP    Recent Labs  Lab 04/01/17  1538 04/01/17  0512 03/31/17  2105 03/31/17  0710 03/31/17  0003 03/30/17  1605 03/30/17  0724 03/29/17  1710    132* 134 136 136 139 137 136   POTASSIUM 3.5 3.8 3.3* 3.8 3.7 3.7 3.6 3.7   CHLORIDE 100 97 97 100 98 99 102 98   CO2 24 24 28 24 31 31 19* 31   ANIONGAP 11 11 9 11 7 9 16* 7   * 137* 160* 101* 101* 116* 95 111*   BUN 16 17 16 19 19 16 20 20   CR 0.84 0.85 0.91 0.96 1.02 1.01 0.86 0.94   GFRESTIMATED >90Non  GFR Calc >90Non  GFR Calc 85 80 74 75 >90Non  GFR Calc 81   GFRESTBLACK >90African American GFR Calc >90African American GFR Calc >90African " American GFR Calc >90African American GFR Calc 90 >90African American GFR Calc >90African American GFR Calc >90African American GFR Calc   ASHLEE 8.0* 9.4 8.6 8.6 8.9 8.6 8.8 8.6   MAG  --   --   --   --  2.2 2.3 2.5*  --    PROTTOTAL  --  8.4  --  7.4  --   --  7.2 8.0   ALBUMIN  --  3.6  --  3.1*  --   --  3.2* 3.6   BILITOTAL  --  1.5*  --  1.5*  --   --  1.9* 2.3*   ALKPHOS  --  164*  --  160*  --   --  174* 204*   AST  --  42  --  43  --   --  52* 60*   ALT  --  71*  --  75*  --   --  81* 99*     CBC    Recent Labs  Lab 04/01/17  0512 03/31/17  0710 03/29/17  1710   WBC 8.8 7.7 9.1   RBC 5.08 4.61 4.86   HGB 16.6 15.0 16.1   HCT 51.1 47.2 50.7   * 102* 104*   MCH 32.7 32.5 33.1*   MCHC 32.5 31.8 31.8   RDW 15.1* 15.4* 15.8*    232 217     INR    Recent Labs  Lab 03/29/17  1710   INR 1.20*     Arterial Blood Gas    Recent Labs  Lab 03/30/17  1230   O2PER 21%       Imaging Studies:  Cardiology TTE 3/22 OSH:  Markedly reduced left ventricular systolic function.  The ejection fraction is visually estimated to be 15%.  Mild aortic regurgitation.  Mild-to-moderate mitral regurgitation.  Low normal right ventricular systolic function.  Pulmonary artery systolic pressure is measured at 37 mmHg.    Assessment and Plan  Mr. Danielito Chu is a 62 y/o M with pmh significant for HFrEF (last EF 15% ) 2/2 to unknown etiology (presumed non ischemic) , mild-moderate MR, pre-diabetes, former tobacco use (0.3 packs for 37 years), untreated sleep apnea (central and obstructive), HTN, hepatic congestion 2/2 CHF, who presents as a direct admission from [place], [place] for further CHF treatment and workup.      #HFrEF (last EF 15%) 2/2 unknown etiology (presumed NICM, possible distant history of viral myocarditis ?!)   #Mild-moderate MR  #HTN  The etiology of his HF is unclear. Patient was never worked for CAD - he will need angiography and right heart catheter to r/o CAD and better assess cardiac function. However, patient is  grossly fluid overload on exam - will continue with aggressive diuresis and plan to do the previous along with TTE once the patient is optimized fluid status wise.     Tolerating aggressive diuresis - prompt response to medical therapy will increase after load reduction .     - Lasix gtt 10mg/hr  - Increase Ramipril 5 mg BID  - Continue Spironolactone 25 mg qday   - Continue ASA 81 daily  - Continue digoxin 0.25mg daily  - Continue daily Toprol XL to 37.5 given active CHF exacerbation  - Continue simvastatin 20mg daily  - Strict I/O's, daily weights, cardiac diet  - TSH elevated will check FT4  - Normal Iron panel for other causes of HFrEF   - HIV testing pending     #Congestive hepatopathy  Likely 2/2 to above, LFTs improving with diuresis .   - Abdominal US negative   - Continue to trend LFTs    #AG metabolic acidosis, resolved      Patient staffed with Dr. Watkins.    Prophylaxis:  DVT: Ambulate x3 TID GI: None   Family: by bedside  Disposition: expected 5-7 day hospital stay    Code Status: Full    Patient was seen and discussed with attending physician Dr. June Dailey MD  Internal Medicine, PGY-1  Pager [phone]

## 2017-04-03 ENCOUNTER — APPOINTMENT (OUTPATIENT)
Dept: CARDIOLOGY | Facility: CLINIC | Age: 62
DRG: 222 | End: 2017-04-03
Attending: INTERNAL MEDICINE
Payer: COMMERCIAL

## 2017-04-03 LAB
ALBUMIN SERPL-MCNC: 3.2 G/DL (ref 3.4–5)
ALP SERPL-CCNC: 124 U/L (ref 40–150)
ALT SERPL W P-5'-P-CCNC: 58 U/L (ref 0–70)
ANION GAP SERPL CALCULATED.3IONS-SCNC: 10 MMOL/L (ref 3–14)
ANION GAP SERPL CALCULATED.3IONS-SCNC: 14 MMOL/L (ref 3–14)
AST SERPL W P-5'-P-CCNC: 42 U/L (ref 0–45)
BASE EXCESS BLDV CALC-SCNC: 4 MMOL/L
BILIRUB SERPL-MCNC: 2.5 MG/DL (ref 0.2–1.3)
BUN SERPL-MCNC: 19 MG/DL (ref 7–30)
BUN SERPL-MCNC: 21 MG/DL (ref 7–30)
CALCIUM SERPL-MCNC: 8.4 MG/DL (ref 8.5–10.1)
CALCIUM SERPL-MCNC: 8.8 MG/DL (ref 8.5–10.1)
CHLORIDE SERPL-SCNC: 94 MMOL/L (ref 94–109)
CHLORIDE SERPL-SCNC: 98 MMOL/L (ref 94–109)
CO2 SERPL-SCNC: 24 MMOL/L (ref 20–32)
CO2 SERPL-SCNC: 24 MMOL/L (ref 20–32)
CREAT SERPL-MCNC: 0.87 MG/DL (ref 0.66–1.25)
CREAT SERPL-MCNC: 0.88 MG/DL (ref 0.66–1.25)
ERYTHROCYTE [DISTWIDTH] IN BLOOD BY AUTOMATED COUNT: 15 % (ref 10–15)
GFR SERPL CREATININE-BSD FRML MDRD: 88 ML/MIN/1.7M2
GFR SERPL CREATININE-BSD FRML MDRD: 89 ML/MIN/1.7M2
GLUCOSE BLDC GLUCOMTR-MCNC: 149 MG/DL (ref 70–99)
GLUCOSE SERPL-MCNC: 111 MG/DL (ref 70–99)
GLUCOSE SERPL-MCNC: 120 MG/DL (ref 70–99)
HCO3 BLDV-SCNC: 29 MMOL/L (ref 21–28)
HCT VFR BLD AUTO: 53.1 % (ref 40–53)
HGB BLD-MCNC: 17.6 G/DL (ref 13.3–17.7)
INTERPRETATION ECG - MUSE: NORMAL
MAGNESIUM SERPL-MCNC: 2.3 MG/DL (ref 1.6–2.3)
MCH RBC QN AUTO: 33 PG (ref 26.5–33)
MCHC RBC AUTO-ENTMCNC: 33.1 G/DL (ref 31.5–36.5)
MCV RBC AUTO: 99 FL (ref 78–100)
O2/TOTAL GAS SETTING VFR VENT: ABNORMAL %
OXYHGB MFR BLDV: 87 %
PCO2 BLDV: 44 MM HG (ref 40–50)
PH BLDV: 7.43 PH (ref 7.32–7.43)
PLATELET # BLD AUTO: 270 10E9/L (ref 150–450)
PO2 BLDV: 56 MM HG (ref 25–47)
POTASSIUM SERPL-SCNC: 3.6 MMOL/L (ref 3.4–5.3)
POTASSIUM SERPL-SCNC: 4 MMOL/L (ref 3.4–5.3)
PROT SERPL-MCNC: 8 G/DL (ref 6.8–8.8)
RBC # BLD AUTO: 5.34 10E12/L (ref 4.4–5.9)
SODIUM SERPL-SCNC: 129 MMOL/L (ref 133–144)
SODIUM SERPL-SCNC: 136 MMOL/L (ref 133–144)
WBC # BLD AUTO: 9.3 10E9/L (ref 4–11)

## 2017-04-03 PROCEDURE — 93005 ELECTROCARDIOGRAM TRACING: CPT

## 2017-04-03 PROCEDURE — 99153 MOD SED SAME PHYS/QHP EA: CPT | Performed by: INTERNAL MEDICINE

## 2017-04-03 PROCEDURE — B2111ZZ FLUOROSCOPY OF MULTIPLE CORONARY ARTERIES USING LOW OSMOLAR CONTRAST: ICD-10-PCS | Performed by: INTERNAL MEDICINE

## 2017-04-03 PROCEDURE — 25000132 ZZH RX MED GY IP 250 OP 250 PS 637: Performed by: STUDENT IN AN ORGANIZED HEALTH CARE EDUCATION/TRAINING PROGRAM

## 2017-04-03 PROCEDURE — 85027 COMPLETE CBC AUTOMATED: CPT | Performed by: STUDENT IN AN ORGANIZED HEALTH CARE EDUCATION/TRAINING PROGRAM

## 2017-04-03 PROCEDURE — 27210946 ZZH KIT HC TOTES DISP CR8

## 2017-04-03 PROCEDURE — 93456 R HRT CORONARY ARTERY ANGIO: CPT

## 2017-04-03 PROCEDURE — 82805 BLOOD GASES W/O2 SATURATION: CPT | Performed by: INTERNAL MEDICINE

## 2017-04-03 PROCEDURE — 80048 BASIC METABOLIC PNL TOTAL CA: CPT | Performed by: INTERNAL MEDICINE

## 2017-04-03 PROCEDURE — 99152 MOD SED SAME PHYS/QHP 5/>YRS: CPT

## 2017-04-03 PROCEDURE — 25000132 ZZH RX MED GY IP 250 OP 250 PS 637: Performed by: INTERNAL MEDICINE

## 2017-04-03 PROCEDURE — 25000128 H RX IP 250 OP 636: Performed by: STUDENT IN AN ORGANIZED HEALTH CARE EDUCATION/TRAINING PROGRAM

## 2017-04-03 PROCEDURE — 27210742 ZZH CATH CR1

## 2017-04-03 PROCEDURE — 99232 SBSQ HOSP IP/OBS MODERATE 35: CPT | Mod: 25 | Performed by: INTERNAL MEDICINE

## 2017-04-03 PROCEDURE — 99152 MOD SED SAME PHYS/QHP 5/>YRS: CPT | Performed by: INTERNAL MEDICINE

## 2017-04-03 PROCEDURE — 27210787 ZZH MANIFOLD CR2

## 2017-04-03 PROCEDURE — 4A023N6 MEASUREMENT OF CARDIAC SAMPLING AND PRESSURE, RIGHT HEART, PERCUTANEOUS APPROACH: ICD-10-PCS | Performed by: INTERNAL MEDICINE

## 2017-04-03 PROCEDURE — 80053 COMPREHEN METABOLIC PANEL: CPT | Performed by: STUDENT IN AN ORGANIZED HEALTH CARE EDUCATION/TRAINING PROGRAM

## 2017-04-03 PROCEDURE — 93010 ELECTROCARDIOGRAM REPORT: CPT | Performed by: INTERNAL MEDICINE

## 2017-04-03 PROCEDURE — 25000125 ZZHC RX 250: Performed by: INTERNAL MEDICINE

## 2017-04-03 PROCEDURE — 27210807 ZZH SHEATH CR6

## 2017-04-03 PROCEDURE — C1894 INTRO/SHEATH, NON-LASER: HCPCS

## 2017-04-03 PROCEDURE — 00000146 ZZHCL STATISTIC GLUCOSE BY METER IP

## 2017-04-03 PROCEDURE — 99232 SBSQ HOSP IP/OBS MODERATE 35: CPT | Mod: GC | Performed by: INTERNAL MEDICINE

## 2017-04-03 PROCEDURE — 27211181 ZZH BALLOON TIP PRESSURE CR5

## 2017-04-03 PROCEDURE — 20000004 ZZH R&B ICU UMMC

## 2017-04-03 PROCEDURE — 25000128 H RX IP 250 OP 636: Performed by: INTERNAL MEDICINE

## 2017-04-03 PROCEDURE — 40000275 ZZH STATISTIC RCP TIME EA 10 MIN

## 2017-04-03 PROCEDURE — 36415 COLL VENOUS BLD VENIPUNCTURE: CPT | Performed by: INTERNAL MEDICINE

## 2017-04-03 PROCEDURE — 27211089 ZZH KIT ACIST INJECTOR CR3

## 2017-04-03 PROCEDURE — 83735 ASSAY OF MAGNESIUM: CPT | Performed by: INTERNAL MEDICINE

## 2017-04-03 PROCEDURE — 36415 COLL VENOUS BLD VENIPUNCTURE: CPT | Performed by: STUDENT IN AN ORGANIZED HEALTH CARE EDUCATION/TRAINING PROGRAM

## 2017-04-03 PROCEDURE — 25000125 ZZHC RX 250: Performed by: STUDENT IN AN ORGANIZED HEALTH CARE EDUCATION/TRAINING PROGRAM

## 2017-04-03 PROCEDURE — 93456 R HRT CORONARY ARTERY ANGIO: CPT | Mod: 26 | Performed by: INTERNAL MEDICINE

## 2017-04-03 PROCEDURE — 99153 MOD SED SAME PHYS/QHP EA: CPT

## 2017-04-03 RX ORDER — NIFEDIPINE 10 MG/1
10 CAPSULE ORAL
Status: DISCONTINUED | OUTPATIENT
Start: 2017-04-03 | End: 2017-04-03 | Stop reason: HOSPADM

## 2017-04-03 RX ORDER — PROTAMINE SULFATE 10 MG/ML
1-5 INJECTION, SOLUTION INTRAVENOUS
Status: DISCONTINUED | OUTPATIENT
Start: 2017-04-03 | End: 2017-04-03 | Stop reason: HOSPADM

## 2017-04-03 RX ORDER — VERAPAMIL HYDROCHLORIDE 2.5 MG/ML
1-2.5 INJECTION, SOLUTION INTRAVENOUS
Status: DISCONTINUED | OUTPATIENT
Start: 2017-04-03 | End: 2017-04-03 | Stop reason: HOSPADM

## 2017-04-03 RX ORDER — DOPAMINE HYDROCHLORIDE 160 MG/100ML
2-20 INJECTION, SOLUTION INTRAVENOUS CONTINUOUS PRN
Status: DISCONTINUED | OUTPATIENT
Start: 2017-04-03 | End: 2017-04-03 | Stop reason: HOSPADM

## 2017-04-03 RX ORDER — SODIUM NITROPRUSSIDE 25 MG/ML
100-200 INJECTION INTRAVENOUS
Status: DISCONTINUED | OUTPATIENT
Start: 2017-04-03 | End: 2017-04-03 | Stop reason: HOSPADM

## 2017-04-03 RX ORDER — NALOXONE HYDROCHLORIDE 0.4 MG/ML
0.4 INJECTION, SOLUTION INTRAMUSCULAR; INTRAVENOUS; SUBCUTANEOUS EVERY 5 MIN PRN
Status: DISCONTINUED | OUTPATIENT
Start: 2017-04-03 | End: 2017-04-03 | Stop reason: HOSPADM

## 2017-04-03 RX ORDER — POTASSIUM CHLORIDE 29.8 MG/ML
20 INJECTION INTRAVENOUS
Status: DISCONTINUED | OUTPATIENT
Start: 2017-04-03 | End: 2017-04-03 | Stop reason: HOSPADM

## 2017-04-03 RX ORDER — METHYLPREDNISOLONE SODIUM SUCCINATE 125 MG/2ML
125 INJECTION, POWDER, LYOPHILIZED, FOR SOLUTION INTRAMUSCULAR; INTRAVENOUS
Status: DISCONTINUED | OUTPATIENT
Start: 2017-04-03 | End: 2017-04-03 | Stop reason: HOSPADM

## 2017-04-03 RX ORDER — LORAZEPAM 2 MG/ML
.5-2 INJECTION INTRAMUSCULAR EVERY 4 HOURS PRN
Status: DISCONTINUED | OUTPATIENT
Start: 2017-04-03 | End: 2017-04-03 | Stop reason: HOSPADM

## 2017-04-03 RX ORDER — NITROGLYCERIN 5 MG/ML
100-200 VIAL (ML) INTRAVENOUS
Status: DISCONTINUED | OUTPATIENT
Start: 2017-04-03 | End: 2017-04-03 | Stop reason: HOSPADM

## 2017-04-03 RX ORDER — CLOPIDOGREL BISULFATE 75 MG/1
75 TABLET ORAL
Status: DISCONTINUED | OUTPATIENT
Start: 2017-04-03 | End: 2017-04-03 | Stop reason: HOSPADM

## 2017-04-03 RX ORDER — NITROGLYCERIN 20 MG/100ML
.07-2 INJECTION INTRAVENOUS CONTINUOUS PRN
Status: DISCONTINUED | OUTPATIENT
Start: 2017-04-03 | End: 2017-04-03 | Stop reason: HOSPADM

## 2017-04-03 RX ORDER — FLUMAZENIL 0.1 MG/ML
0.2 INJECTION, SOLUTION INTRAVENOUS
Status: DISCONTINUED | OUTPATIENT
Start: 2017-04-03 | End: 2017-04-03 | Stop reason: HOSPADM

## 2017-04-03 RX ORDER — ENALAPRILAT 1.25 MG/ML
1.25-2.5 INJECTION INTRAVENOUS
Status: DISCONTINUED | OUTPATIENT
Start: 2017-04-03 | End: 2017-04-03 | Stop reason: HOSPADM

## 2017-04-03 RX ORDER — PHENYLEPHRINE HCL IN 0.9% NACL 1 MG/10 ML
20-100 SYRINGE (ML) INTRAVENOUS
Status: DISCONTINUED | OUTPATIENT
Start: 2017-04-03 | End: 2017-04-03 | Stop reason: HOSPADM

## 2017-04-03 RX ORDER — HEPARIN SODIUM 1000 [USP'U]/ML
1000-10000 INJECTION, SOLUTION INTRAVENOUS; SUBCUTANEOUS EVERY 5 MIN PRN
Status: DISCONTINUED | OUTPATIENT
Start: 2017-04-03 | End: 2017-04-03 | Stop reason: HOSPADM

## 2017-04-03 RX ORDER — NICARDIPINE HYDROCHLORIDE 2.5 MG/ML
100 INJECTION INTRAVENOUS
Status: DISCONTINUED | OUTPATIENT
Start: 2017-04-03 | End: 2017-04-03 | Stop reason: HOSPADM

## 2017-04-03 RX ORDER — CLOPIDOGREL BISULFATE 75 MG/1
300-600 TABLET ORAL
Status: DISCONTINUED | OUTPATIENT
Start: 2017-04-03 | End: 2017-04-03 | Stop reason: HOSPADM

## 2017-04-03 RX ORDER — DOBUTAMINE HYDROCHLORIDE 200 MG/100ML
2-20 INJECTION INTRAVENOUS CONTINUOUS PRN
Status: DISCONTINUED | OUTPATIENT
Start: 2017-04-03 | End: 2017-04-03 | Stop reason: HOSPADM

## 2017-04-03 RX ORDER — PRASUGREL 10 MG/1
10-60 TABLET, FILM COATED ORAL
Status: DISCONTINUED | OUTPATIENT
Start: 2017-04-03 | End: 2017-04-03 | Stop reason: HOSPADM

## 2017-04-03 RX ORDER — NITROGLYCERIN 0.4 MG/1
0.4 TABLET SUBLINGUAL EVERY 5 MIN PRN
Status: DISCONTINUED | OUTPATIENT
Start: 2017-04-03 | End: 2017-04-03 | Stop reason: HOSPADM

## 2017-04-03 RX ORDER — ASPIRIN 81 MG/1
81-324 TABLET, CHEWABLE ORAL
Status: DISCONTINUED | OUTPATIENT
Start: 2017-04-03 | End: 2017-04-03 | Stop reason: HOSPADM

## 2017-04-03 RX ORDER — ADENOSINE 3 MG/ML
12-12000 INJECTION, SOLUTION INTRAVENOUS
Status: DISCONTINUED | OUTPATIENT
Start: 2017-04-03 | End: 2017-04-03 | Stop reason: HOSPADM

## 2017-04-03 RX ORDER — LIDOCAINE HYDROCHLORIDE 10 MG/ML
30 INJECTION, SOLUTION EPIDURAL; INFILTRATION; INTRACAUDAL; PERINEURAL
Status: DISCONTINUED | OUTPATIENT
Start: 2017-04-03 | End: 2017-04-03 | Stop reason: HOSPADM

## 2017-04-03 RX ORDER — ARGATROBAN 1 MG/ML
150 INJECTION, SOLUTION INTRAVENOUS
Status: DISCONTINUED | OUTPATIENT
Start: 2017-04-03 | End: 2017-04-03 | Stop reason: HOSPADM

## 2017-04-03 RX ORDER — FUROSEMIDE 10 MG/ML
20-100 INJECTION INTRAMUSCULAR; INTRAVENOUS
Status: DISCONTINUED | OUTPATIENT
Start: 2017-04-03 | End: 2017-04-03 | Stop reason: HOSPADM

## 2017-04-03 RX ORDER — HYDRALAZINE HYDROCHLORIDE 20 MG/ML
10-20 INJECTION INTRAMUSCULAR; INTRAVENOUS
Status: DISCONTINUED | OUTPATIENT
Start: 2017-04-03 | End: 2017-04-03 | Stop reason: HOSPADM

## 2017-04-03 RX ORDER — PROTAMINE SULFATE 10 MG/ML
25-100 INJECTION, SOLUTION INTRAVENOUS EVERY 5 MIN PRN
Status: DISCONTINUED | OUTPATIENT
Start: 2017-04-03 | End: 2017-04-03 | Stop reason: HOSPADM

## 2017-04-03 RX ORDER — ONDANSETRON 2 MG/ML
4 INJECTION INTRAMUSCULAR; INTRAVENOUS EVERY 4 HOURS PRN
Status: DISCONTINUED | OUTPATIENT
Start: 2017-04-03 | End: 2017-04-03 | Stop reason: HOSPADM

## 2017-04-03 RX ORDER — DEXTROSE MONOHYDRATE 25 G/50ML
12.5-5 INJECTION, SOLUTION INTRAVENOUS EVERY 30 MIN PRN
Status: DISCONTINUED | OUTPATIENT
Start: 2017-04-03 | End: 2017-04-03 | Stop reason: HOSPADM

## 2017-04-03 RX ORDER — NITROGLYCERIN 5 MG/ML
100-500 VIAL (ML) INTRAVENOUS
Status: DISCONTINUED | OUTPATIENT
Start: 2017-04-03 | End: 2017-04-03 | Stop reason: HOSPADM

## 2017-04-03 RX ORDER — IOPAMIDOL 755 MG/ML
25 INJECTION, SOLUTION INTRAVASCULAR ONCE
Status: COMPLETED | OUTPATIENT
Start: 2017-04-03 | End: 2017-04-03

## 2017-04-03 RX ORDER — PROMETHAZINE HYDROCHLORIDE 25 MG/ML
6.25-25 INJECTION, SOLUTION INTRAMUSCULAR; INTRAVENOUS EVERY 4 HOURS PRN
Status: DISCONTINUED | OUTPATIENT
Start: 2017-04-03 | End: 2017-04-03 | Stop reason: HOSPADM

## 2017-04-03 RX ORDER — FENTANYL CITRATE 50 UG/ML
25-50 INJECTION, SOLUTION INTRAMUSCULAR; INTRAVENOUS
Status: DISCONTINUED | OUTPATIENT
Start: 2017-04-03 | End: 2017-04-03 | Stop reason: HOSPADM

## 2017-04-03 RX ORDER — DIPHENHYDRAMINE HYDROCHLORIDE 50 MG/ML
25-50 INJECTION INTRAMUSCULAR; INTRAVENOUS
Status: COMPLETED | OUTPATIENT
Start: 2017-04-03 | End: 2017-04-03

## 2017-04-03 RX ORDER — EPTIFIBATIDE 2 MG/ML
2 INJECTION, SOLUTION INTRAVENOUS CONTINUOUS PRN
Status: DISCONTINUED | OUTPATIENT
Start: 2017-04-03 | End: 2017-04-03 | Stop reason: HOSPADM

## 2017-04-03 RX ORDER — EPTIFIBATIDE 2 MG/ML
180 INJECTION, SOLUTION INTRAVENOUS EVERY 10 MIN PRN
Status: DISCONTINUED | OUTPATIENT
Start: 2017-04-03 | End: 2017-04-03 | Stop reason: HOSPADM

## 2017-04-03 RX ORDER — LIDOCAINE 40 MG/G
CREAM TOPICAL
Status: DISCONTINUED | OUTPATIENT
Start: 2017-04-03 | End: 2017-04-03 | Stop reason: HOSPADM

## 2017-04-03 RX ORDER — ARGATROBAN 1 MG/ML
350 INJECTION, SOLUTION INTRAVENOUS
Status: DISCONTINUED | OUTPATIENT
Start: 2017-04-03 | End: 2017-04-03 | Stop reason: HOSPADM

## 2017-04-03 RX ORDER — ASPIRIN 325 MG
325 TABLET ORAL
Status: DISCONTINUED | OUTPATIENT
Start: 2017-04-03 | End: 2017-04-03 | Stop reason: HOSPADM

## 2017-04-03 RX ORDER — POTASSIUM CHLORIDE 7.45 MG/ML
10 INJECTION INTRAVENOUS
Status: DISCONTINUED | OUTPATIENT
Start: 2017-04-03 | End: 2017-04-03 | Stop reason: HOSPADM

## 2017-04-03 RX ADMIN — METOPROLOL SUCCINATE 37.5 MG: 25 TABLET, EXTENDED RELEASE ORAL at 08:15

## 2017-04-03 RX ADMIN — DIGOXIN 250 MCG: 125 TABLET ORAL at 13:59

## 2017-04-03 RX ADMIN — FUROSEMIDE 10 MG/HR: 10 INJECTION, SOLUTION INTRAMUSCULAR; INTRAVENOUS at 04:41

## 2017-04-03 RX ADMIN — TRAZODONE HYDROCHLORIDE 50 MG: 50 TABLET ORAL at 01:41

## 2017-04-03 RX ADMIN — TRAZODONE HYDROCHLORIDE 50 MG: 50 TABLET ORAL at 23:55

## 2017-04-03 RX ADMIN — SIMVASTATIN 20 MG: 20 TABLET, FILM COATED ORAL at 20:43

## 2017-04-03 RX ADMIN — POTASSIUM CHLORIDE 20 MEQ: 750 TABLET, EXTENDED RELEASE ORAL at 23:27

## 2017-04-03 RX ADMIN — Medication 1 LOZENGE: at 08:19

## 2017-04-03 RX ADMIN — RAMIPRIL 5 MG: 5 CAPSULE ORAL at 20:43

## 2017-04-03 RX ADMIN — Medication 1 LOZENGE: at 01:12

## 2017-04-03 RX ADMIN — SPIRONOLACTONE 25 MG: 25 TABLET ORAL at 08:14

## 2017-04-03 RX ADMIN — POTASSIUM CHLORIDE 10 MEQ: 14.9 INJECTION, SOLUTION, CONCENTRATE PARENTERAL at 08:46

## 2017-04-03 RX ADMIN — RAMIPRIL 5 MG: 5 CAPSULE ORAL at 08:14

## 2017-04-03 RX ADMIN — SODIUM NITROPRUSSIDE 0.25 MCG/KG/MIN: 25 INJECTION, SOLUTION, CONCENTRATE INTRAVENOUS at 20:42

## 2017-04-03 RX ADMIN — ASPIRIN 325 MG: 325 TABLET, DELAYED RELEASE ORAL at 08:13

## 2017-04-03 RX ADMIN — DIPHENHYDRAMINE HYDROCHLORIDE 50 MG: 50 INJECTION, SOLUTION INTRAMUSCULAR; INTRAVENOUS at 15:27

## 2017-04-03 RX ADMIN — SENNOSIDES AND DOCUSATE SODIUM 1 TABLET: 8.6; 5 TABLET ORAL at 08:14

## 2017-04-03 RX ADMIN — IOPAMIDOL 25 ML: 755 INJECTION, SOLUTION INTRAVASCULAR at 16:30

## 2017-04-03 RX ADMIN — FENTANYL CITRATE 50 MCG: 50 INJECTION, SOLUTION INTRAMUSCULAR; INTRAVENOUS at 15:27

## 2017-04-03 RX ADMIN — SENNOSIDES AND DOCUSATE SODIUM 1 TABLET: 8.6; 5 TABLET ORAL at 20:43

## 2017-04-03 RX ADMIN — POTASSIUM CHLORIDE 10 MEQ: 14.9 INJECTION, SOLUTION, CONCENTRATE PARENTERAL at 10:31

## 2017-04-03 NOTE — PROGRESS NOTES
Pt held in CCL holding room waiting for ICU bed. VSS with no complaints of pain. Pt alert and oriented x 4. SWAN locked at 60cm in RIJV. Pt has defib pads in place per Dr. Stone request due to LBBB. Report called to ARAVIND Martinez 4C. Pt transported to  on monitor with RN.

## 2017-04-03 NOTE — CONSULTS
Electrophysiology Consultation Note   EP Attending: .   Reason for consultation: Consideration for ICD.   Provider requesting consultation: , Cardiology II Service.  Date of Service: 4/3/2017      HPI:   Mr. Chu is a 61 year old male who has a past medical history significant for NIDCM LVEF 15%, mild-moderate MR, pre-DM, former tobacco use, untreated sleep apnea (central and obstructive), HTN, and hepatic congestion. He was admitted after being referred from [place] for advanced heart failure management. He initially presented to [place] on 3/21/17 reporting progressive SOB, lower extremity weakness, weight gain, and orthopnea. He was found to have elevated BNP, slightly elevated LFTs, and CXR c/w CHF. He was started on IV lasix but did not diurese well with minimal UOP and was referred here. He states he has had HF for 10 years and has been on medications since then. He states he was doing well and out of the hospital for 8 years and then has now had 2-3 hospitalizations for HF over the last couple years. ICD was discussed about 2 years ago with him and he never pursued it due to job changes at the time. He reports feeling improved symptoms since being here. He went to cath lab today for RHC/LHC which showed no significant CAD, elevated right and left sided filling pressures, moderate secondary pulmonary hypertension with a mean PAP of 48 mmHg, and decreased cardiac output. [place] was left in with plan to start Nipride. Na 129, other electrolytes and renal function stable. Presenting 12 lead ECG shows SR with IVCD Vent Rate 84 bpm,  ms,  ms, QTc 486 ms. Current cardiac medications include: ASA, digoxin, Toprol XL, Zocor, and Spironolactone.   Past Medical History:   No past medical history on file.  Past Surgical History:   No past surgical history on file.  Allergies: Per MAR   No Known Allergies  Medications:   Per MAR current outpatient cardiovascular medications include: No  "prescriptions prior to admission.     No current outpatient prescriptions on file.     Current Facility-Administered Medications   Medication Dose Route Frequency     sodium chloride (PF)  3 mL Intracatheter Q8H     ramipril  5 mg Oral BID     spironolactone  25 mg Oral Daily     senna-docusate  1-2 tablet Oral BID     pneumococcal vaccine  0.5 mL Intramuscular Prior to discharge     aspirin EC  81 mg Oral Daily     digoxin  250 mcg Oral Daily     metoprolol  37.5 mg Oral Daily     simvastatin  20 mg Oral QPM     Family History:   No family history on file.  Social History:   Social History   Substance Use Topics     Smoking status: Not on file     Smokeless tobacco: Not on file     Alcohol use Not on file       ROS:   A comprehensive 10 point ROS was negative other than as mentioned in HPI.    Physical Examination:   VITALS: /78 (BP Location: Left arm)  Pulse 74  Temp 97.6  F (36.4  C) (Tympanic)  Resp 18  Ht 1.765 m (5' 9.5\")  Wt 102.2 kg (225 lb 6.4 oz)  SpO2 97%  BMI 32.81 kg/m2  GENERAL APPEARANCE: AxO, NAD   HEENT: NCAT, EOMI, MMM. PERRLA.   NECK: Supple.    CHEST: CTAB   CARDIOVASCULAR: S1S2, Reg, No m/r/g.   ABDOMEN: Rounded, slightly firm, NT, BS+   EXTREMITIES: Trace pedal edema. Distal pulses intact.   NEURO: Grossly nonfocal.   PSYCH: Normal affect.  SKIN: Warm and dry.   Data:   Labs:  BMP  Recent Labs  Lab 04/03/17  0741 04/02/17  1729 04/02/17  0717 04/01/17  1538   * 131* 129* 135   POTASSIUM 4.0 4.2 3.8 3.5   CHLORIDE 94 95 91* 100   ASHLEE 8.8 8.6 9.0 8.0*   CO2 24 23 28 24   BUN 19 18 16 16   CR 0.87 1.00 0.93 0.84   * 119* 158* 112*     CBC  Recent Labs  Lab 04/03/17  0741 04/02/17  0717 04/01/17  0512 03/31/17  0710   WBC 9.3 8.9 8.8 7.7   RBC 5.34 5.27 5.08 4.61   HGB 17.6 17.3 16.6 15.0   HCT 53.1* 53.4* 51.1 47.2   MCV 99 101* 101* 102*   MCH 33.0 32.8 32.7 32.5   MCHC 33.1 32.4 32.5 31.8   RDW 15.0 15.0 15.1* 15.4*    276 260 232     INR  Recent Labs  Lab " 03/29/17  1710   INR 1.20*     EKG:     3/22/17 ECHO  Conclusions:   Markedly reduced left ventricular systolic function.  The ejection fraction is visually estimated to be 15%.  Mild aortic regurgitation.  Mild-to-moderate mitral regurgitation.  Low normal right ventricular systolic function.  Pulmonary artery systolic pressure is measured at 37 mmHg.  A fib.    Assessment:   Mr. Chu is a 61 year old male who has a past medical history significant for NIDCM LVEF 15%, mild-moderate MR, pre-DM, former tobacco use, untreated sleep apnea (central and obstructive), HTN, and hepatic congestion. He was admitted after being referred from [place] for advanced heart failure management. He initially presented to [place] on 3/21/17 reporting progressive SOB, lower extremity weakness, weight gain, and orthopnea. He was found to have elevated BNP, slightly elevated LFTs, and CXR c/w CHF. He was started on IV lasix but did not diurese well with minimal UOP and was referred here. He states he has had HF for 10 years and has been on medications since then. He states he was doing well and out of the hospital for 8 years and then has now had 2-3 hospitalizations for HF over the last couple years. ICD was discussed about 2 years ago with him and he never pursued it due to job changes at the time. He reports feeling improved symptoms since being here. He went to cath lab today for RHC/LHC which showed no significant CAD, elevated right and left sided filling pressures, moderate secondary pulmonary hypertension with a mean PAP of 48 mmHg, and decreased cardiac output. [place] was left in with plan to start Nipride. Na 129, other electrolytes and renal function stable. Presenting 12 lead ECG shows SR with IVCD Vent Rate 84 bpm,  ms,  ms, QTc 486 ms. Current cardiac medications include: ASA, digoxin, Toprol XL, Zocor, and Spironolactone.   EP Recommendations:  He has a class I indication for primary prevention ICD. We  discussed this in detail with patient including indications, risks, and benefits. The risk of defibrillator placement include: over sedation, reaction to local anesthetic, reaction to narcotics or benzodiazipines used for moderate secation, localized bleeding, internal bleeding, collapsed lung, and acute or late infections. There is the possibilty of unforseen complications as well such as device or lead failure, lead dislodgement, and inappropriate shocks from the defibrillator. He states understanding and wishes to proceed. This would best be done when he is tuned up from a HF standpoint. We will plan to implant a single chamber ICD prior to discharge.     The patient states understanding and is agreeable with plan.   Thank you much for allowing us to participate in the care of this pleasant patient.     The patient was discussed w/ Dr. Zavala.  The above note reflects our joint plan.    JOSE A Diaz CNP  Electrophysiology Consult Service  Pager: [phone]    EP STAFF NOTE  I have seen and examined the patient as part of a shared visit with ESTEBAN Diaz NP.  I agree with the note above. I reviewed today's vital signs and medications. I have reviewed and discussed with the advanced practice provider their physical examination, assessment, and plan  Patient seen on 4/3/2017.  Briefly, patient with NICM for years and EF below 35% for more than 2 years, consult for ICD.  My key history/exam findings are: swan in place, trace pedal edema..   The key management decisions made by me: has a class I indication for ICD. Will pursue ICD when swan is out and better optimized..    Daniel Zavala MD [facility]  Cardiology - Electrophysiology

## 2017-04-03 NOTE — PLAN OF CARE
Problem: Goal Outcome Summary  Goal: Goal Outcome Summary  Pt admitted for congestive heart failure with PMH of non-ischemic dilated cardiomyopathy, systolic and diastolic CHF and HTN. Pt VSS throughout shift. HR was SR/ST between 70's - 100's. Currently on lasix gtt at 10 mL/hr running through L PIV with good UOP. Pt AO x 4 and moves independently. PRN lozenges for sore throat and PRN trazodone x 1 for sleep. NPO since MN for scheduled angiogram later this AM. Continue to monitor and notify physician of any pertinent changes.

## 2017-04-03 NOTE — PLAN OF CARE
Problem: Goal Outcome Summary  Goal: Goal Outcome Summary     Pt alert and oriented.  VSS on room air.  SR.  No complaints of pain or increased SOB.  Pt ambulating halls independently.  K+ w/ AM labs 4.0, replaced per protocol w/ 10 mEq IV- second 10 mEq IV pt noted burning so stopped, did not want PO due to chance of upset stomach, plans to take PO after procedure.  Pt NPO for RHC/angio this afternoon. ICD likely tomorrow.  Continue with plan of care and notify team w/ changes/concerns.

## 2017-04-03 NOTE — PROCEDURES
PRELIMINARY CARDIAC CATH REPORT:     PROCEDURES PERFORMED:   1. Selective left and right coronary angiography.  2. Right heart catheterization.    PHYSICIANS:  Attending Interventional Cardiology Staff: Dr. Chase MD  Cardiology Fellow: Nayely Miller MD     INDICATION:  60 y/o M with chronic systolic and diastolic HF (last LVEF 15%), mild-moderate MR, pre-diabetes, former tobacco use, untreated sleep apnea, HTN, who presents for a coronary angiogram and right heart catheterization    DESCRIPTION:  1. Consent obtained with discussion of risks.  All questions were answered.  2. Sterile prep and procedure.  3. Location: right common femoral artery and right common femoral vein.  4. Access: Local anesthetic with lidocaine.  A micropuncture (21 g) needle with ultrasound guidance was used to establish vascular access using a modified Seldinger technique.  5. Sheath: 4Fr long sheath.  6. Catheters: 4Fr JL4.5 and 3DRC  7. Fluoroscopy time of 5.7 min.  8. Estimated blood loss of <10 mL.  9. See below for procedure details.    MEDICATIONS:  1. Contrast 25 mL IV.  2. Conscious sedation with fentanyl and midazolam as directed by the attending cardiologist.    CONSCIOUS SEDATION:   The procedure was performed under conscious sedation for 47 min.  A total of 0 mg Versed and 50 mcg Fentanyl were used. Heart rate, blood pressure, respiration, oxygen saturation, and patient responses were monitored throughout the procedure with RN assistance.     RIGHT HEART CATHETERIZATION:        CORONARY ANGIOGRAM:  1. Both coronary arteries arise from their respective cusps normally.  2. Right dominant.  3. Left Main (LM) large caliber vessel with no angiographic evidence of disease.  4. Left Anterior Descending (LAD): is a type 3 vessel which gives rise to septal perforates and four diagonal vessels all without angiographic evidence of disease.  6. Right Coronary Artery (RCA): large caliber vessel which gives rise to a posterior descending artery  and a posterolateral branch system all without angiographic evidence of disease.    COMPLICATIONS:  1. None    SUMMARY:   Non ischemic cardiomyopathy  Elevated right and left sided filling pressures  Moderate secondary pulmonary hypertension with a mean PAP of 48 mmHg.  Decreased cardiac output  No angiographic evidence of obstructive coronary artery disease.    PLAN:   Bedrest and access site monitoring per protocol.  Return to the primary inpatient team for further evaluation and management.  Continued aggressive medical management and lifestyle modification for cardiovascular risk factor optimization.     The attending interventional cardiologist was present for the entire procedure.  See CVIS report for final draft.  Nayely Miller MD  Cardiology Fellow

## 2017-04-03 NOTE — PROGRESS NOTES
".arc    [facility]  Cardiology I Service  Daily Note  4/3/2017    Event over the past 24 hours:  No acute events overnight.     Hemodynamics:  HR 70 - 80~  /66 (MAP 78)     INs and outs:  In 1.06 - Out 3.1 - Net -2.040  In 145 - Out 775 - Net -630    Vitals:    04/01/17 0514 04/02/17 0024 04/03/17 0122   Weight: 101 kg (222 lb 11.2 oz) 101.4 kg (223 lb 9.6 oz) 102.2 kg (225 lb 6.4 oz)       Pertinent Medications:  Drips:   None as of 4/3    Other Meds:  [id number] mg  Digoxin 250 mcg  Metoprolol 37.5 mg qday  Ramipril  5 mg BID  Simvastatin 20 mg qhs  Spironolactone 25 mg qday    Examination:  /66 (BP Location: Left arm)  Pulse 74  Temp 97.9  F (36.6  C) (Oral)  Resp 18  Ht 1.765 m (5' 9.5\")  Wt 102.2 kg (225 lb 6.4 oz)  SpO2 96%  BMI 32.81 kg/m2      GEN: A & O, resting comfortably in bed, NAD, pleasant and conversational   HEENT: PERRL, no scleral icterus, mucus membranes moist  NECK: Supple, no LAD, JVP 1 cm above clavicle (lower)  RESP: CTA bilaterally, no crackles,   CV: Regular, normal rate, distant S1 and S2 , 3/6 systolic murmur in the apex and left sternal border  ABD: Soft, distended, no HSM, +BS, no guarding   EXT: +2 pitting edema above the knees   NEURO: CN II-XII intact, no focal motor or sensory deficit     Data:  CMP    Recent Labs  Lab 04/02/17  1729 04/02/17  0717 04/01/17  1538 04/01/17  0512  03/31/17  0710 03/31/17  0003 03/30/17  1605 03/30/17  0724   * 129* 135 132*  < > 136 136 139 137   POTASSIUM 4.2 3.8 3.5 3.8  < > 3.8 3.7 3.7 3.6   CHLORIDE 95 91* 100 97  < > 100 98 99 102   CO2 23 28 24 24  < > 24 31 31 19*   ANIONGAP 13 9 11 11  < > 11 7 9 16*   * 158* 112* 137*  < > 101* 101* 116* 95   BUN 18 16 16 17  < > 19 19 16 20   CR 1.00 0.93 0.84 0.85  < > 0.96 1.02 1.01 0.86   GFRESTIMATED 76 83 >90Non  GFR Calc >90Non  GFR Calc  < > 80 74 75 >90Non  GFR Calc   GFRESTBLACK " >90African American GFR Calc >90African American GFR Calc >90African American GFR Calc >90African American GFR Calc  < > >90African American GFR Calc 90 >90African American GFR Calc >90African American GFR Calc   ASHLEE 8.6 9.0 8.0* 9.4  < > 8.6 8.9 8.6 8.8   MAG  --   --   --   --   --   --  2.2 2.3 2.5*   PROTTOTAL  --  8.3  --  8.4  --  7.4  --   --  7.2   ALBUMIN  --  3.6  --  3.6  --  3.1*  --   --  3.2*   BILITOTAL  --  1.7*  --  1.5*  --  1.5*  --   --  1.9*   ALKPHOS  --  148  --  164*  --  160*  --   --  174*   AST  --  37  --  42  --  43  --   --  52*   ALT  --  62  --  71*  --  75*  --   --  81*   < > = values in this interval not displayed.  CBC    Recent Labs  Lab 04/02/17  0717 04/01/17  0512 03/31/17  0710 03/29/17  1710   WBC 8.9 8.8 7.7 9.1   RBC 5.27 5.08 4.61 4.86   HGB 17.3 16.6 15.0 16.1   HCT 53.4* 51.1 47.2 50.7   * 101* 102* 104*   MCH 32.8 32.7 32.5 33.1*   MCHC 32.4 32.5 31.8 31.8   RDW 15.0 15.1* 15.4* 15.8*    260 232 217     INR    Recent Labs  Lab 03/29/17  1710   INR 1.20*     Arterial Blood Gas    Recent Labs  Lab 03/30/17  1230   O2PER 21%       Imaging Studies:  Cardiology TTE 3/22 OSH:  Markedly reduced left ventricular systolic function.  The ejection fraction is visually estimated to be 15%.  Mild aortic regurgitation.  Mild-to-moderate mitral regurgitation.  Low normal right ventricular systolic function.  Pulmonary artery systolic pressure is measured at 37 mmHg.    Assessment and Plan  Mr. Danielito Chu is a 62 y/o M with pmh significant for HFrEF (last EF 15% ) 2/2 to unknown etiology (presumed non ischemic) , mild-moderate MR, pre-diabetes, former tobacco use (0.3 packs for 37 years), untreated sleep apnea (central and obstructive), HTN, hepatic congestion 2/2 CHF, who presents as a direct admission from [place], [place] for further CHF treatment and workup.     Plan:  Angiography / Right heart catheter today  Hold Lasix gtt     #HFrEF (last EF 15%) 2/2 unknown etiology  (presumed NICM, possible distant history of viral myocarditis ?!)   #Mild-moderate MR  #HTN  The etiology of his HF is unclear. Patient was never worked for CAD - he will need angiography and right heart catheter to r/o CAD and better assess cardiac function. However, patient is grossly fluid overload on exam - will continue with aggressive diuresis and plan to do the previous along with TTE once the patient is optimized fluid status wise.     JVP 1 cm above clavicle. Will hold lasix gtt  . Will decide on diuresis dose later in the day based on R. Heart cath numbers.      - Increase Ramipril 5 mg BID  - Continue Spironolactone 25 mg qday   - Continue ASA 81 daily  - Continue digoxin 0.25mg daily  - Continue daily Toprol XL to 37.5 given active CHF exacerbation  - Continue simvastatin 20mg daily  - Strict I/O's, daily weights, cardiac diet  - TSH elevated ---> FT4 normal   - Normal Iron panel for other causes of HFrEF   - HIV negative  - Plan for ICD on 4/4 , EP consult in place . Appreciate input    #Congestive hepatopathy  Likely 2/2 to above, LFTs improving with diuresis .   - Abdominal US negative   - Continue to trend LFTs    #AG metabolic acidosis, resolved      Patient staffed with Dr. Watkins.    Prophylaxis:  DVT: Ambulate x3 TID GI: None   Family: by bedside  Disposition: expected 5-7 day hospital stay    Code Status: Full    Patient was seen and discussed with attending physician Dr. Edgard Dailey MD  Internal Medicine, PGY-1  Pager [phone]    I have reviewed today's vital signs, notes, medications, labs and imaging.  I have also seen and examined the patient and agree with the findings and plan as outlined above.  Pt without compalints.  VSS with lungs clear and nl S1 and S2 with soft S3.  Abd is benign.  Afebrile, 109/66, RR 18.  PCW 25 and CI 1.6.  Assessment: Pt with cardiogenic shock.  Plan to admit to ICU, place arterial line, vasodilate with nipride and diurese pt.  Will  advance oral afterload agents after pt has stable PCW.  Plan discussed with pt and team.  Pt seen X2 for total critical care time 30 min.     Bennett Rene MD, PhD  Professor, Heart Failure and Cardiac Transplantation  [facility]

## 2017-04-04 ENCOUNTER — APPOINTMENT (OUTPATIENT)
Dept: GENERAL RADIOLOGY | Facility: CLINIC | Age: 62
DRG: 222 | End: 2017-04-04
Attending: INTERNAL MEDICINE
Payer: COMMERCIAL

## 2017-04-04 LAB
ALBUMIN SERPL-MCNC: 2.8 G/DL (ref 3.4–5)
ALP SERPL-CCNC: 94 U/L (ref 40–150)
ALT SERPL W P-5'-P-CCNC: 45 U/L (ref 0–70)
ANION GAP SERPL CALCULATED.3IONS-SCNC: 8 MMOL/L (ref 3–14)
ANION GAP SERPL CALCULATED.3IONS-SCNC: 9 MMOL/L (ref 3–14)
AST SERPL W P-5'-P-CCNC: 30 U/L (ref 0–45)
BASE EXCESS BLDV CALC-SCNC: 1.8 MMOL/L
BASE EXCESS BLDV CALC-SCNC: 2.9 MMOL/L
BASE EXCESS BLDV CALC-SCNC: 3.2 MMOL/L
BASE EXCESS BLDV CALC-SCNC: 3.3 MMOL/L
BASE EXCESS BLDV CALC-SCNC: 3.8 MMOL/L
BASE EXCESS BLDV CALC-SCNC: 4.2 MMOL/L
BILIRUB SERPL-MCNC: 1.4 MG/DL (ref 0.2–1.3)
BUN SERPL-MCNC: 17 MG/DL (ref 7–30)
BUN SERPL-MCNC: 23 MG/DL (ref 7–30)
CALCIUM SERPL-MCNC: 7.6 MG/DL (ref 8.5–10.1)
CALCIUM SERPL-MCNC: 8.2 MG/DL (ref 8.5–10.1)
CHLORIDE SERPL-SCNC: 95 MMOL/L (ref 94–109)
CHLORIDE SERPL-SCNC: 99 MMOL/L (ref 94–109)
CO2 SERPL-SCNC: 26 MMOL/L (ref 20–32)
CO2 SERPL-SCNC: 28 MMOL/L (ref 20–32)
CREAT SERPL-MCNC: 0.81 MG/DL (ref 0.66–1.25)
CREAT SERPL-MCNC: 0.81 MG/DL (ref 0.66–1.25)
ERYTHROCYTE [DISTWIDTH] IN BLOOD BY AUTOMATED COUNT: 15 % (ref 10–15)
GFR SERPL CREATININE-BSD FRML MDRD: ABNORMAL ML/MIN/1.7M2
GFR SERPL CREATININE-BSD FRML MDRD: ABNORMAL ML/MIN/1.7M2
GLUCOSE SERPL-MCNC: 133 MG/DL (ref 70–99)
GLUCOSE SERPL-MCNC: 151 MG/DL (ref 70–99)
HCO3 BLDV-SCNC: 27 MMOL/L (ref 21–28)
HCO3 BLDV-SCNC: 28 MMOL/L (ref 21–28)
HCO3 BLDV-SCNC: 28 MMOL/L (ref 21–28)
HCO3 BLDV-SCNC: 29 MMOL/L (ref 21–28)
HCO3 BLDV-SCNC: 29 MMOL/L (ref 21–28)
HCO3 BLDV-SCNC: 30 MMOL/L (ref 21–28)
HCT VFR BLD AUTO: 46.8 % (ref 40–53)
HGB BLD-MCNC: 15.2 G/DL (ref 13.3–17.7)
INTERPRETATION ECG - MUSE: NORMAL
MAGNESIUM SERPL-MCNC: 2.1 MG/DL (ref 1.6–2.3)
MAGNESIUM SERPL-MCNC: 2.4 MG/DL (ref 1.6–2.3)
MCH RBC QN AUTO: 32.5 PG (ref 26.5–33)
MCHC RBC AUTO-ENTMCNC: 32.5 G/DL (ref 31.5–36.5)
MCV RBC AUTO: 100 FL (ref 78–100)
O2/TOTAL GAS SETTING VFR VENT: 21 %
O2/TOTAL GAS SETTING VFR VENT: ABNORMAL %
OXYHGB MFR BLDV: 51 %
OXYHGB MFR BLDV: 58 %
OXYHGB MFR BLDV: 62 %
OXYHGB MFR BLDV: 67 %
OXYHGB MFR BLDV: 67 %
OXYHGB MFR BLDV: 71 %
PCO2 BLDV: 43 MM HG (ref 40–50)
PCO2 BLDV: 44 MM HG (ref 40–50)
PCO2 BLDV: 45 MM HG (ref 40–50)
PCO2 BLDV: 45 MM HG (ref 40–50)
PCO2 BLDV: 46 MM HG (ref 40–50)
PCO2 BLDV: 48 MM HG (ref 40–50)
PH BLDV: 7.4 PH (ref 7.32–7.43)
PH BLDV: 7.41 PH (ref 7.32–7.43)
PHOSPHATE SERPL-MCNC: 2.6 MG/DL (ref 2.5–4.5)
PLATELET # BLD AUTO: 240 10E9/L (ref 150–450)
PO2 BLDV: 29 MM HG (ref 25–47)
PO2 BLDV: 32 MM HG (ref 25–47)
PO2 BLDV: 33 MM HG (ref 25–47)
PO2 BLDV: 36 MM HG (ref 25–47)
PO2 BLDV: 36 MM HG (ref 25–47)
PO2 BLDV: 39 MM HG (ref 25–47)
POTASSIUM SERPL-SCNC: 3.7 MMOL/L (ref 3.4–5.3)
POTASSIUM SERPL-SCNC: 4.2 MMOL/L (ref 3.4–5.3)
PROT SERPL-MCNC: 6.5 G/DL (ref 6.8–8.8)
RBC # BLD AUTO: 4.68 10E12/L (ref 4.4–5.9)
SODIUM SERPL-SCNC: 132 MMOL/L (ref 133–144)
SODIUM SERPL-SCNC: 133 MMOL/L (ref 133–144)
WBC # BLD AUTO: 10.5 10E9/L (ref 4–11)

## 2017-04-04 PROCEDURE — 25000128 H RX IP 250 OP 636: Performed by: INTERNAL MEDICINE

## 2017-04-04 PROCEDURE — 25000125 ZZHC RX 250: Performed by: INTERNAL MEDICINE

## 2017-04-04 PROCEDURE — 25000125 ZZHC RX 250: Performed by: STUDENT IN AN ORGANIZED HEALTH CARE EDUCATION/TRAINING PROGRAM

## 2017-04-04 PROCEDURE — 40000809 ZZH STATISTIC NO DOCUMENTATION TO SUPPORT CHARGE

## 2017-04-04 PROCEDURE — 84100 ASSAY OF PHOSPHORUS: CPT | Performed by: INTERNAL MEDICINE

## 2017-04-04 PROCEDURE — 85027 COMPLETE CBC AUTOMATED: CPT | Performed by: INTERNAL MEDICINE

## 2017-04-04 PROCEDURE — 40000940 XR CHEST PORT 1 VW

## 2017-04-04 PROCEDURE — 94660 CPAP INITIATION&MGMT: CPT

## 2017-04-04 PROCEDURE — 83735 ASSAY OF MAGNESIUM: CPT | Performed by: INTERNAL MEDICINE

## 2017-04-04 PROCEDURE — 25000132 ZZH RX MED GY IP 250 OP 250 PS 637: Performed by: STUDENT IN AN ORGANIZED HEALTH CARE EDUCATION/TRAINING PROGRAM

## 2017-04-04 PROCEDURE — 80053 COMPREHEN METABOLIC PANEL: CPT | Performed by: INTERNAL MEDICINE

## 2017-04-04 PROCEDURE — 71010 XR CHEST PORT 1 VW: CPT

## 2017-04-04 PROCEDURE — 99232 SBSQ HOSP IP/OBS MODERATE 35: CPT | Mod: GC | Performed by: INTERNAL MEDICINE

## 2017-04-04 PROCEDURE — 25000132 ZZH RX MED GY IP 250 OP 250 PS 637: Performed by: INTERNAL MEDICINE

## 2017-04-04 PROCEDURE — 80048 BASIC METABOLIC PNL TOTAL CA: CPT | Performed by: INTERNAL MEDICINE

## 2017-04-04 PROCEDURE — 25000128 H RX IP 250 OP 636: Performed by: STUDENT IN AN ORGANIZED HEALTH CARE EDUCATION/TRAINING PROGRAM

## 2017-04-04 PROCEDURE — 82805 BLOOD GASES W/O2 SATURATION: CPT | Performed by: INTERNAL MEDICINE

## 2017-04-04 PROCEDURE — 20000004 ZZH R&B ICU UMMC

## 2017-04-04 RX ORDER — RAMIPRIL 5 MG/1
5 CAPSULE ORAL 2 TIMES DAILY
Status: DISCONTINUED | OUTPATIENT
Start: 2017-04-04 | End: 2017-04-05

## 2017-04-04 RX ORDER — FUROSEMIDE 10 MG/ML
40 INJECTION INTRAMUSCULAR; INTRAVENOUS ONCE
Status: COMPLETED | OUTPATIENT
Start: 2017-04-04 | End: 2017-04-04

## 2017-04-04 RX ORDER — HEPARIN SODIUM 5000 [USP'U]/.5ML
5000 INJECTION, SOLUTION INTRAVENOUS; SUBCUTANEOUS EVERY 8 HOURS
Status: DISCONTINUED | OUTPATIENT
Start: 2017-04-04 | End: 2017-04-04

## 2017-04-04 RX ORDER — RAMIPRIL 5 MG/1
10 CAPSULE ORAL 2 TIMES DAILY
Status: DISCONTINUED | OUTPATIENT
Start: 2017-04-04 | End: 2017-04-04

## 2017-04-04 RX ADMIN — ACETAMINOPHEN 650 MG: 325 TABLET, FILM COATED ORAL at 22:38

## 2017-04-04 RX ADMIN — RAMIPRIL 5 MG: 5 CAPSULE ORAL at 09:26

## 2017-04-04 RX ADMIN — SPIRONOLACTONE 25 MG: 25 TABLET ORAL at 09:26

## 2017-04-04 RX ADMIN — SODIUM NITROPRUSSIDE 1 MCG/KG/MIN: 25 INJECTION, SOLUTION, CONCENTRATE INTRAVENOUS at 22:25

## 2017-04-04 RX ADMIN — DIGOXIN 250 MCG: 125 TABLET ORAL at 16:21

## 2017-04-04 RX ADMIN — SENNOSIDES AND DOCUSATE SODIUM 1 TABLET: 8.6; 5 TABLET ORAL at 09:31

## 2017-04-04 RX ADMIN — RAMIPRIL 5 MG: 5 CAPSULE ORAL at 20:19

## 2017-04-04 RX ADMIN — SODIUM NITROPRUSSIDE 0.5 MCG/KG/MIN: 25 INJECTION, SOLUTION, CONCENTRATE INTRAVENOUS at 10:30

## 2017-04-04 RX ADMIN — TRAZODONE HYDROCHLORIDE 50 MG: 50 TABLET ORAL at 22:39

## 2017-04-04 RX ADMIN — METOPROLOL SUCCINATE 37.5 MG: 25 TABLET, EXTENDED RELEASE ORAL at 09:27

## 2017-04-04 RX ADMIN — FUROSEMIDE 40 MG: 10 INJECTION, SOLUTION INTRAVENOUS at 17:35

## 2017-04-04 RX ADMIN — ASPIRIN 81 MG: 81 TABLET, COATED ORAL at 09:25

## 2017-04-04 RX ADMIN — SIMVASTATIN 20 MG: 20 TABLET, FILM COATED ORAL at 20:19

## 2017-04-04 RX ADMIN — POTASSIUM CHLORIDE 20 MEQ: 750 TABLET, EXTENDED RELEASE ORAL at 09:31

## 2017-04-04 NOTE — PROGRESS NOTES
Type of Procedure: Arterial line placement, right radial artery      Date of Procedure: 4/1/17      Informed consent was obtained.   Edisno's test was performed with confirmation of the presence of Ulnar arterial flow.     Pt was prepped and draped in the usual sterile fashion. The skin was anesthetized with lidocaine. Using ultrasound guidance, vascular access was established with the micropuncture needle on the first pass, and an arterial line was placed. Pressure waveform confirmed arterial placement. There were no immediate complications.     Procedure was supervised by Dr. Apple.   Harriett Dailey, PGY-1  Internal Medicine      Bennett Rene MD, PhD  Professor, Heart Failure and Cardiac Transplantation  [facility]

## 2017-04-04 NOTE — PLAN OF CARE
Problem: Goal Outcome Summary  Goal: Goal Outcome Summary  Outcome: No Change  D: Pt admitted from CCL s/p heart cath. Returned with PA catheter  I/A: Orders for [place] readings & FICKs. Kelly placed  P: Start nipride gtt and titrate  To MAP 60-70. Plan for ICD placement tomorrow.

## 2017-04-04 NOTE — PLAN OF CARE
Problem: Cardiac: Heart Failure (Adult)  Goal: Signs and Symptoms of Listed Potential Problems Will be Absent or Manageable (Cardiac: Heart Failure)  Signs and symptoms of listed potential problems will be absent or manageable by discharge/transition of care (reference Cardiac: Heart Failure (Adult) CPG).   D/I/A: Nipride gtt initiated and continued at 0.5 mcg/kg/min. MAP maintained >65. FICC CI 2.0 this am, CO 4.3. Defibrillator pads in place. Potassium replaced per protocol. P: Will continue to monitor.

## 2017-04-04 NOTE — SIGNIFICANT EVENT
04/04/17 1114   Visit Information   Visit Made By Priest Stewart   Type of Visit Initial   Visited Patient;Family  (sister, significant oher)   Interventions   Basic Spiritual Interventions    introduction/orientation to Spiritual Health Services;Assessment of spiritual needs/resources;Reflective conversation;Prayer   Ritual/Sacramental Encounter  Jain anointing   Advanced Assessments/Interventions   Presenting Concerns/Issues Spiritual/Taoist/emotional support   SPIRITUAL HEALTH SERVICES Significant Event  Jain Sacrament of ANOINTING  [facility] 4C  I anointed patient per request of patient    I stopped to visit Fabiano during routine visit to unit. He transferred here from [facility] near his home. His sister is with him as is is significant other. He reports relief and feelings hat he is doing better than anticipated. 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 He thought he may be headed for a transplant, but is grateful that the doctors here are confident his condition can be managed without that intervention.    He welcomes prayer today and I share with him the sacrament of anointing and describe continuing availability of  during his stay and at his request.     Father Brenden Stewart

## 2017-04-04 NOTE — PROGRESS NOTES
"      [facility]   Cardiology 2  Daily Progress Note      Date of Admission: 3/29/2017  Date of Service: 04/04/2017    Danielito Chu MRN# [id number]   Age: 61 year old YOB: 1955     Interval History   Feels well.  In the ICU for close hemodynamic monitoring with start of IV nipride.  Stable hemodynamics overnight.  Nipride gtt started on 0.5.  No nausea or vomiting.  Voiding without difficulty.  Denies any pain.                CI CO SVR  0400 15.2 0.97 0.67 77 8 2.01 4.43 1244  0800 15.2 0.98 0.62 83 6         1.67 3.74 1644  1200 15.2 0.98 0.71 65 6 2.23 4.99     945    Medications     Medications and allergies reviewed in [facility].  Changes are reflected in the assessment and plan.      Current Meds:  Nipride gtt 0.5    Ramipril 5 mg BID.    Toprol XL 37.5 daily.    Digoxin 250 mcg  Spironolactone 25 mg daily.    ASA 81 mg daily.        Home meds:    Ramipril 2.5 mg daily.    Digoxin 0.25 mg daily.    Toprolol XL 75 mg daily.    ASA 81 mg   Lasix 40 mg BID.   Spironolactone 25 mg daily.    Simvastatin 20 mg.        Physical Exam   BP 98/73 (BP Location: Right arm)  Pulse 74  Temp 98.2  F (36.8  C) (Axillary)  Resp 28  Ht 1.765 m (5' 9.5\")  Wt 101.2 kg (223 lb 1.7 oz)  SpO2 97%  BMI 32.47 kg/m2    Wt Readings from Last 1 Encounters:   04/03/17 101.2 kg (223 lb 1.7 oz)    Body mass index is 32.47 kg/(m^2).     Physical Exam   Constitutional: He is oriented to person, place, and time and well-developed, well-nourished, and in no distress. No distress.   HENT:   Head: Normocephalic.   Mouth/Throat: Oropharynx is clear and moist. No oropharyngeal exudate.   Eyes: Conjunctivae and EOM are normal. Pupils are equal, round, and reactive to light. No scleral icterus.   Neck: Normal range of motion. Neck supple.   PA catheter in RIJ   Cardiovascular: Normal rate and regular rhythm.  Exam reveals no gallop and no friction rub.    No murmur heard.  Pulmonary/Chest: Effort normal and breath sounds " normal. No respiratory distress. He has no wheezes.   Abdominal: Soft. Bowel sounds are normal. He exhibits no distension. There is no tenderness.   Musculoskeletal: Normal range of motion. He exhibits no edema.   Neurological: He is alert and oriented to person, place, and time.   Skin: Skin is warm and dry. He is not diaphoretic. No erythema.       Drains and Tubes: none.      Intravascular Access and Device:   Radial artery line - placed 4/3.    Left PIV.    PA cath in right IJ - placed 4/3      Intake/Output Summary (Last 24 hours) at 04/04/17 1346  Last data filed at 04/04/17 1200   Gross per 24 hour   Intake          1119.33 ml   Output             1490 ml   Net          -370.67 ml     Data     Labs & Studies of Note: I personally reviewed the labs in [facility].      Results for JUAN SHEN (MRN [id number]) as of 4/4/2017 14:10   4/4/2017 04:12   Sodium 132 (L)   Potassium 3.7   Chloride 95   Carbon Dioxide 28   Urea Nitrogen 23   Creatinine 0.81   GFR Estimate >90...   GFR Estimate If Black >90...   Calcium 8.2 (L)   Anion Gap 9   Magnesium 2.4 (H)   Phosphorus 2.6   Albumin 2.8 (L)   Protein Total 6.5 (L)   Bilirubin Total 1.4 (H)   Alkaline Phosphatase 94   ALT 45   AST 30      4/4/2017 04:12   WBC 10.5   Hemoglobin 15.2   Hematocrit 46.8   Platelet Count 240   RBC Count 4.68      MCH 32.5   MCHC 32.5   RDW 15.0       Imaging:   Recent Results (from the past 24 hour(s))   XR Chest Port 1 View    Narrative    XR CHEST PORT 1 VW  4/4/2017 6:04 AM      HISTORY: interval exam for wan.    COMPARISON: 3/24/2017    FINDINGS: [place] catheter tip projects over the right interlobar  pulmonary artery. External defibrillator pad projects over the right  hemithorax. Upper abdomen is unremarkable. No acute osseous  abnormalities. No pneumothorax or pleural effusion. Mild enlargement  of the cardiac silhouette, which is stable. Pulmonary vascular  congestion. Perihilar and bibasilar linear opacities.       Impression    IMPRESSION:   1. [place] catheter, tip projecting over the right interlobar  pulmonary artery. No pneumothorax.  2. Perihilar and bibasilar linear opacities, suggestive of  atelectasis.  3. Stable enlargement of the cardiac silhouette and pulmonary vascular  congestion.    I have personally reviewed the examination and initial interpretation  and I agree with the findings.    COLLEEN SANDOVAL MD   XR Chest Port 1 View    Narrative    XR CHEST PORT 1 VW  4/4/2017 8:54 AM      HISTORY: eval for pa cath    COMPARISON: Earlier 4/4/2017    FINDINGS: Interval retraction of the [place] catheter, tip now  projecting over the pulmonary artery outflow tract. External  defibrillator pad projects over the right hemithorax. Mild enlargement  of the cardiac silhouette, which is stable. Epicardial pacer wires. No  pneumothorax or pleural effusion. No acute osseous abnormalities.  Pulmonary vascular congestion. Perihilar and bibasilar mixed  opacities.      Impression    IMPRESSION:   1. Tip of the [place] catheter now projects of the pulmonary artery  outflow tract.  2. Perihilar and bibasilar mixed opacities, suggestive of pulmonary  edema. Likely superimposed component of atelectasis.  3. Stable enlargement of the cardiac silhouette.    I have personally reviewed the examination and initial interpretation  and I agree with the findings.    COLLEEN SANDOVAL MD       Main Plans for Today   Q4 hour Hemodynamics.    - Nipride gtt.    - May need to consider advanced therapies as well.      Assessment & Plan      Mr. Danielito Chu is a 60 y/o M with pmh significant for HFrEF (last EF 15% ) 2/2 to unknown etiology (presumed non ischemic) , mild-moderate MR, pre-diabetes, former tobacco use (0.3 packs for 37 years), untreated sleep apnea (central and obstructive), HTN, hepatic congestion 2/2 CHF, who presents as a direct admission from [place], [place] for further CHF treatment and workup.      Plan:  - Hold diuretics with  CVP of 6.    - Up titrate Nipride as tolerated to achieve goal of CI of 2 while maintaining MAPS above 65-70.   - Transition to oral afterload reduction when tolerated.   - Monitor for need for further advanced therapies.        Acute on Chronic HFrEF (last EF 15%) 2/2 NICM/Cardiogenic shock:  Mild-moderate MR  HTN  Idiopathic non ischemic cardiomyopathy - ? Viral.  TSH elevated - Free T4 is normal.  HIV negative.  Normal iron panel, Ferritin of 290    Coronary angiogram 4/3: See above - No ischemic disease.    RHC 4/3:  CI 1.6 by Timmy.  RA 12, PA 63/35(48), PAW 25, SVR 2000  Hemodynamics 4/4: See above for trend.  This afternoon CI 2.23, CVP 6, .     - Afterload: Nipride gtt started 4/3. Currently on small dose of 0.5 - transition to PO afterload reduction when optimized.     - Preload: Lasix on hold with CVP of 6 - will inquire about home diuretic regimen.  - AceI: Ramipril 5 mg BID  - Aldosterone antagonist: Spironolactone 25 mg qday   - BB: Toprol XL 37.5 mg daily.    - SCD ppx: - Plan for ICD pending swan removal and optimizing heart failure medications - EP following.    - ASA 81 daily  - Digoxin 0.25mg daily  - Toprol XL 37.5 - Index is low but the patients has been stable.    - Simvastatin 20mg daily  - Strict I/O's, daily weights, cardiac diet    Congestive hepatopathy (now resolving)  Likely 2/2 to above  - Abdominal US negative   - Continue to trend LFTs  ------------------------------------------------------------------------------------------------------------------  Prophylaxis:     -GI: None.      -DVT: Heparin 5000 units subq - hold in anticipation for ICD possibly in the AM.      FEN: cardiac diet.    Consults: EP for ICD  Family: Updated at bedside.    Code status: Full Code.    Disposition: Pending optimization of heart failure therapies.     =========================================================  Patient was discussed and evaluated with Dr. Rene.      Roman Oreilly   Internal Medicine  PGY2  [phone]    I have reviewed today's vital signs, notes, medications, labs and imaging.  I have also seen and examined the patient and agree with the findings and plan as outlined above.  Pt with RHC in place and no complaints.   VSS with CVP 8, PA 58/16, CI 2 on nipride, digoxin, aldactone, toprol and ramipril.  Lungs clear and S1 and S2 with soft S3. Abd is benign.  Labs with Cr 0.8 and WBC 10.5.  Assessment: Pt with Stage D heart failure on nipride therapy with marginal outputs.  Plan to continue to advance nipride as tolerated.  Pt seen X 2 with total critical care time 30 min.     Bennett Rene MD, PhD  Professor, Heart Failure and Cardiac Transplantation  [facility]

## 2017-04-05 ENCOUNTER — APPOINTMENT (OUTPATIENT)
Dept: GENERAL RADIOLOGY | Facility: CLINIC | Age: 62
DRG: 222 | End: 2017-04-05
Attending: INTERNAL MEDICINE
Payer: COMMERCIAL

## 2017-04-05 DIAGNOSIS — I50.21 ACUTE SYSTOLIC CONGESTIVE HEART FAILURE (H): Primary | ICD-10-CM

## 2017-04-05 DIAGNOSIS — I42.8 NONISCHEMIC CARDIOMYOPATHY (H): ICD-10-CM

## 2017-04-05 LAB
ALBUMIN SERPL-MCNC: 2.9 G/DL (ref 3.4–5)
ALP SERPL-CCNC: 83 U/L (ref 40–150)
ALT SERPL W P-5'-P-CCNC: 41 U/L (ref 0–70)
ANION GAP SERPL CALCULATED.3IONS-SCNC: 6 MMOL/L (ref 3–14)
ANION GAP SERPL CALCULATED.3IONS-SCNC: 9 MMOL/L (ref 3–14)
AST SERPL W P-5'-P-CCNC: 29 U/L (ref 0–45)
BASE EXCESS BLDV CALC-SCNC: 1.5 MMOL/L
BASE EXCESS BLDV CALC-SCNC: 1.9 MMOL/L
BASE EXCESS BLDV CALC-SCNC: 2.6 MMOL/L
BASE EXCESS BLDV CALC-SCNC: 2.9 MMOL/L
BASE EXCESS BLDV CALC-SCNC: 3.3 MMOL/L
BASE EXCESS BLDV CALC-SCNC: 3.8 MMOL/L
BILIRUB SERPL-MCNC: 0.9 MG/DL (ref 0.2–1.3)
BUN SERPL-MCNC: 11 MG/DL (ref 7–30)
BUN SERPL-MCNC: 13 MG/DL (ref 7–30)
CALCIUM SERPL-MCNC: 7.9 MG/DL (ref 8.5–10.1)
CALCIUM SERPL-MCNC: 8 MG/DL (ref 8.5–10.1)
CHLORIDE SERPL-SCNC: 100 MMOL/L (ref 94–109)
CHLORIDE SERPL-SCNC: 99 MMOL/L (ref 94–109)
CO2 SERPL-SCNC: 24 MMOL/L (ref 20–32)
CO2 SERPL-SCNC: 26 MMOL/L (ref 20–32)
CREAT SERPL-MCNC: 0.68 MG/DL (ref 0.66–1.25)
CREAT SERPL-MCNC: 0.72 MG/DL (ref 0.66–1.25)
ERYTHROCYTE [DISTWIDTH] IN BLOOD BY AUTOMATED COUNT: 14.9 % (ref 10–15)
GFR SERPL CREATININE-BSD FRML MDRD: ABNORMAL ML/MIN/1.7M2
GFR SERPL CREATININE-BSD FRML MDRD: ABNORMAL ML/MIN/1.7M2
GLUCOSE SERPL-MCNC: 106 MG/DL (ref 70–99)
GLUCOSE SERPL-MCNC: 114 MG/DL (ref 70–99)
HCO3 BLDV-SCNC: 26 MMOL/L (ref 21–28)
HCO3 BLDV-SCNC: 27 MMOL/L (ref 21–28)
HCO3 BLDV-SCNC: 28 MMOL/L (ref 21–28)
HCO3 BLDV-SCNC: 29 MMOL/L (ref 21–28)
HCT VFR BLD AUTO: 45.2 % (ref 40–53)
HGB BLD-MCNC: 14.7 G/DL (ref 13.3–17.7)
MAGNESIUM SERPL-MCNC: 2.3 MG/DL (ref 1.6–2.3)
MAGNESIUM SERPL-MCNC: 2.3 MG/DL (ref 1.6–2.3)
MCH RBC QN AUTO: 32.4 PG (ref 26.5–33)
MCHC RBC AUTO-ENTMCNC: 32.5 G/DL (ref 31.5–36.5)
MCV RBC AUTO: 100 FL (ref 78–100)
O2/TOTAL GAS SETTING VFR VENT: 21 %
OXYHGB MFR BLDV: 60 %
OXYHGB MFR BLDV: 65 %
OXYHGB MFR BLDV: 65 %
OXYHGB MFR BLDV: 68 %
OXYHGB MFR BLDV: 71 %
OXYHGB MFR BLDV: 73 %
PCO2 BLDV: 41 MM HG (ref 40–50)
PCO2 BLDV: 42 MM HG (ref 40–50)
PCO2 BLDV: 43 MM HG (ref 40–50)
PCO2 BLDV: 44 MM HG (ref 40–50)
PCO2 BLDV: 45 MM HG (ref 40–50)
PCO2 BLDV: 45 MM HG (ref 40–50)
PH BLDV: 7.4 PH (ref 7.32–7.43)
PH BLDV: 7.4 PH (ref 7.32–7.43)
PH BLDV: 7.41 PH (ref 7.32–7.43)
PH BLDV: 7.41 PH (ref 7.32–7.43)
PH BLDV: 7.42 PH (ref 7.32–7.43)
PH BLDV: 7.43 PH (ref 7.32–7.43)
PLATELET # BLD AUTO: 215 10E9/L (ref 150–450)
PO2 BLDV: 33 MM HG (ref 25–47)
PO2 BLDV: 34 MM HG (ref 25–47)
PO2 BLDV: 35 MM HG (ref 25–47)
PO2 BLDV: 36 MM HG (ref 25–47)
PO2 BLDV: 38 MM HG (ref 25–47)
PO2 BLDV: 39 MM HG (ref 25–47)
POTASSIUM SERPL-SCNC: 4.2 MMOL/L (ref 3.4–5.3)
POTASSIUM SERPL-SCNC: 4.3 MMOL/L (ref 3.4–5.3)
PROT SERPL-MCNC: 6.6 G/DL (ref 6.8–8.8)
RBC # BLD AUTO: 4.54 10E12/L (ref 4.4–5.9)
SODIUM SERPL-SCNC: 131 MMOL/L (ref 133–144)
SODIUM SERPL-SCNC: 133 MMOL/L (ref 133–144)
WBC # BLD AUTO: 9.5 10E9/L (ref 4–11)

## 2017-04-05 PROCEDURE — 82805 BLOOD GASES W/O2 SATURATION: CPT | Performed by: INTERNAL MEDICINE

## 2017-04-05 PROCEDURE — 20000004 ZZH R&B ICU UMMC

## 2017-04-05 PROCEDURE — 83735 ASSAY OF MAGNESIUM: CPT | Performed by: INTERNAL MEDICINE

## 2017-04-05 PROCEDURE — 99232 SBSQ HOSP IP/OBS MODERATE 35: CPT | Mod: GC | Performed by: INTERNAL MEDICINE

## 2017-04-05 PROCEDURE — 25000132 ZZH RX MED GY IP 250 OP 250 PS 637: Performed by: INTERNAL MEDICINE

## 2017-04-05 PROCEDURE — 25000125 ZZHC RX 250: Performed by: INTERNAL MEDICINE

## 2017-04-05 PROCEDURE — 25000132 ZZH RX MED GY IP 250 OP 250 PS 637: Performed by: STUDENT IN AN ORGANIZED HEALTH CARE EDUCATION/TRAINING PROGRAM

## 2017-04-05 PROCEDURE — 85027 COMPLETE CBC AUTOMATED: CPT | Performed by: INTERNAL MEDICINE

## 2017-04-05 PROCEDURE — 25000128 H RX IP 250 OP 636: Performed by: INTERNAL MEDICINE

## 2017-04-05 PROCEDURE — 71010 XR CHEST PORT 1 VW: CPT

## 2017-04-05 PROCEDURE — 80053 COMPREHEN METABOLIC PANEL: CPT | Performed by: INTERNAL MEDICINE

## 2017-04-05 PROCEDURE — 80048 BASIC METABOLIC PNL TOTAL CA: CPT | Performed by: INTERNAL MEDICINE

## 2017-04-05 RX ORDER — LANOLIN ALCOHOL/MO/W.PET/CERES
100 CREAM (GRAM) TOPICAL DAILY
Status: DISCONTINUED | OUTPATIENT
Start: 2017-04-05 | End: 2017-04-08 | Stop reason: HOSPADM

## 2017-04-05 RX ORDER — MULTIPLE VITAMINS W/ MINERALS TAB 9MG-400MCG
1 TAB ORAL DAILY
Status: DISCONTINUED | OUTPATIENT
Start: 2017-04-05 | End: 2017-04-08 | Stop reason: HOSPADM

## 2017-04-05 RX ORDER — RAMIPRIL 2.5 MG/1
2.5 CAPSULE ORAL ONCE
Status: COMPLETED | OUTPATIENT
Start: 2017-04-05 | End: 2017-04-05

## 2017-04-05 RX ORDER — HYDRALAZINE HYDROCHLORIDE 25 MG/1
25 TABLET, FILM COATED ORAL 3 TIMES DAILY
Status: DISCONTINUED | OUTPATIENT
Start: 2017-04-05 | End: 2017-04-06

## 2017-04-05 RX ADMIN — SIMVASTATIN 20 MG: 20 TABLET, FILM COATED ORAL at 20:10

## 2017-04-05 RX ADMIN — ASPIRIN 81 MG: 81 TABLET, COATED ORAL at 08:18

## 2017-04-05 RX ADMIN — RAMIPRIL 2.5 MG: 2.5 CAPSULE ORAL at 12:29

## 2017-04-05 RX ADMIN — METOPROLOL SUCCINATE 37.5 MG: 25 TABLET, EXTENDED RELEASE ORAL at 08:18

## 2017-04-05 RX ADMIN — ACETAMINOPHEN 650 MG: 325 TABLET, FILM COATED ORAL at 06:55

## 2017-04-05 RX ADMIN — SODIUM NITROPRUSSIDE 1.25 MCG/KG/MIN: 25 INJECTION, SOLUTION, CONCENTRATE INTRAVENOUS at 11:59

## 2017-04-05 RX ADMIN — TRAZODONE HYDROCHLORIDE 50 MG: 50 TABLET ORAL at 22:28

## 2017-04-05 RX ADMIN — SPIRONOLACTONE 25 MG: 25 TABLET ORAL at 08:18

## 2017-04-05 RX ADMIN — RAMIPRIL 7.5 MG: 5 CAPSULE ORAL at 21:36

## 2017-04-05 RX ADMIN — DIGOXIN 250 MCG: 125 TABLET ORAL at 13:46

## 2017-04-05 RX ADMIN — HYDRALAZINE HYDROCHLORIDE 25 MG: 25 TABLET ORAL at 20:10

## 2017-04-05 RX ADMIN — Medication 100 MG: at 15:51

## 2017-04-05 RX ADMIN — SODIUM NITROPRUSSIDE 1.25 MCG/KG/MIN: 25 INJECTION, SOLUTION, CONCENTRATE INTRAVENOUS at 06:09

## 2017-04-05 RX ADMIN — RAMIPRIL 5 MG: 5 CAPSULE ORAL at 08:18

## 2017-04-05 RX ADMIN — MULTIPLE VITAMINS W/ MINERALS TAB 1 TABLET: TAB at 15:51

## 2017-04-05 ASSESSMENT — PAIN DESCRIPTION - DESCRIPTORS
DESCRIPTORS: HEADACHE
DESCRIPTORS: HEADACHE

## 2017-04-05 NOTE — PROGRESS NOTES
Nutrition Note: LOS    Reviewed chart and spoke with pt to chk for nutrition risk factors due to LOS. Pt is tolerating a Low Sodium diet, eating well per nursing documentation and per pt's report. Pt states he eats a later breakfast and a dinner meal and this is consistent with his meal routine at home. No issues with appetite or nausea. No nutrition issues identified at this time. RD will sign off at this time, unless consulted.     Bri Valdez RD, LD

## 2017-04-05 NOTE — PLAN OF CARE
"Problem: Cardiac: Heart Failure (Adult)  Goal: Signs and Symptoms of Listed Potential Problems Will be Absent or Manageable (Cardiac: Heart Failure)  Signs and symptoms of listed potential problems will be absent or manageable by discharge/transition of care (reference Cardiac: Heart Failure (Adult) CPG).   Outcome: No Change  D: Jeff is alert and oriented. Having a very difficult time sleeping due to the [place]-Soy catheter and associated lines, finally fell soundly asleep after 0400 labs. C/o a \"cold\", intermittent cough and nasal congestion noted. LS clear, breathing unlabored on room air, RR 12-20. Sinus rhythm with bundle branch block, occasional PVCs. BP generally 90-100s/50-60s. Voiding adequately in urinal, net -116mL yesterday. Good compliance with 1500mL fluid restriction.       I/A: BP monitored closely and nitroprusside titrated per orders. MARILYNN monitoring q4. NPO since midnight for potential ICD placement. Trazodone PRN before bed. Tylenol PRN given for headache with some relief. CXR delayed so that patient could get some more sleep, radiology technician said they will come back at 0800.     P: Continue to monitor cardiovascular status. Potential [place]-Soy removal and ICD placement today. Continue plan of care.       "

## 2017-04-05 NOTE — PROGRESS NOTES
"      [facility]   Cardiology 2  Daily Progress Note      Date of Admission: 3/29/2017  Date of Service: 04/05/2017    Danielito Chu MRN# [id number]   Age: 61 year old YOB: 1955     Interval History   Feels well.  Anxious to get swan out - but understands the need for close monitoring.  Feels well.  No shortness of breath.  Voiding without difficulty.  Normal bowel movements.              CI  SVR  0400      1.95   1135  1200      2.2  960    Medications     Medications and allergies reviewed in [facility].  Changes are reflected in the assessment and plan.      Current Meds:  Nipride gtt 1.25 > decrease to 0.75    Ramipril 5 mg BID.  Increase to 7.5 mg BID   Toprol XL 37.5 daily.    Digoxin 250 mcg  Spironolactone 25 mg daily.    ASA 81 mg daily.        Home meds:    Ramipril 2.5 mg daily.    Digoxin 0.25 mg daily.    Toprolol XL 75 mg daily.    ASA 81 mg   Lasix 40 mg BID.   Spironolactone 25 mg daily.    Simvastatin 20 mg.        Physical Exam   BP 98/73 (BP Location: Right arm)  Pulse 74  Temp 97.5  F (36.4  C) (Oral)  Resp 21  Ht 1.765 m (5' 9.5\")  Wt 101.2 kg (223 lb 1.7 oz)  SpO2 98%  BMI 32.47 kg/m2    Wt Readings from Last 1 Encounters:   04/03/17 101.2 kg (223 lb 1.7 oz)    Body mass index is 32.47 kg/(m^2).     Physical Exam   Constitutional: He is oriented to person, place, and time and well-developed, well-nourished, and in no distress. No distress.   HENT:   Head: Normocephalic.   Mouth/Throat: Oropharynx is clear and moist. No oropharyngeal exudate.   Eyes: Conjunctivae and EOM are normal. Pupils are equal, round, and reactive to light. No scleral icterus.   Neck: Normal range of motion. Neck supple.   PA catheter in RIJ   Cardiovascular: Normal rate and regular rhythm.  Exam reveals no gallop and no friction rub.    No murmur heard.  Pulmonary/Chest: Effort normal and breath sounds normal. No respiratory distress. He has no wheezes.   Abdominal: Soft. Bowel sounds are " normal. He exhibits no distension. There is no tenderness.   Musculoskeletal: Normal range of motion. He exhibits no edema.   Neurological: He is alert and oriented to person, place, and time.   Skin: Skin is warm and dry. He is not diaphoretic. No erythema.       Drains and Tubes: none.      Intravascular Access and Device:   Radial artery line - placed 4/3.    Left PIV.    PA cath in right IJ - placed 4/3    Intake/Output:  1600in /1700 out    Data     Labs & Studies of Note: I personally reviewed the labs in [facility].      Creatinine stable at 0.68.    Hg stable at 14.7.    WBC 9.5.      K 4.2    Imaging:   No results found for this or any previous visit (from the past 24 hour(s)).    Main Plans for Today   Q4 hour Hemodynamics.    - Nipride gtt decrease to 0.75 with increase of Ramipril to 7.5 mg BID.        Assessment & Plan      Mr. Danielito Chu is a 62 y/o M with pmh significant for HFrEF (last EF 15% ) 2/2 to unknown etiology (presumed non ischemic) , mild-moderate MR, pre-diabetes, former tobacco use (0.3 packs for 37 years), untreated sleep apnea (central and obstructive), HTN, hepatic congestion 2/2 CHF, who presents as a direct admission from [place], [place] for further CHF treatment and workup.      Acute on Chronic HFrEF (last EF 15%) 2/2 NICM/Cardiogenic shock:  Mild-moderate MR  HTN  Idiopathic non ischemic cardiomyopathy - ? Viral.  TSH elevated - Free T4 is normal.  HIV negative.  Normal iron panel, Ferritin of 290    Coronary angiogram 4/3:- No ischemic disease.    RHC 4/3:  CI 1.6 by Timmy.  RA 12, PA 63/35(48), PAW 25, SVR 2000  Hemodynamics 4/5: See above for trend.  This afternoon CI 2.2, CVP 6, .   PCW 22  - Afterload: Nipride gtt started 4/3. Currently on small dose of 1.25 - decrease to 0.75.    - Preload: Lasix on hold with CVP of 6. Diuresis as needed - restart Lasix 40 mg daily when appropriate.  - AceI: Ramipril 5 mg BID > increase to 7.5 mg BID (4/5)  - Aldosterone antagonist:  Spironolactone 25 mg qday   - BB: Toprol XL 37.5 mg daily.    - SCD ppx: - Plan for ICD pending swan removal and optimizing heart failure medications - EP following.    - ASA 81 daily  - Digoxin 0.25mg daily  - Toprol XL 37.5 - Index is low but the patients has been stable.    - Simvastatin 20mg daily  - Strict I/O's, daily weights, cardiac diet    Congestive hepatopathy (now resolving)  Likely 2/2 to above  - Abdominal US negative   - Continue to trend LFTs  ------------------------------------------------------------------------------------------------------------------  Prophylaxis:     -GI: None.      -DVT: Heparin 5000 units subq - hold in anticipation for ICD, SCDs    FEN: cardiac diet.    Consults: EP for ICD  Family: Updated at bedside.    Code status: Full Code.    Disposition: Pending optimization of heart failure therapies and transfer to the floor.       =========================================================  Patient was discussed and evaluated with Dr. Rene.      [facility]   Internal Medicine PGY2  [phone]    I have reviewed today's vital signs, notes, medications, labs and imaging.  I have also seen and examined the patient and agree with the findings and plan as outlined above.  Pt with RHC in place and receiving nipride therapy.  VSS with CVP 6 and CI 2.0.  Lungs clear and S1 and S2 with soft S3.  Labs with Cr 0.68, alb 2.9 and WBC 9.5.  Asessment: Pt with advanced heart failure and cardiogenic shock on nipride therapy.  Agree to wean and advance oral afterload reducing agents.  ICD in next couple of days. Total critical care time 30 min.     Bennett Rene MD, PhD  Professor, Heart Failure and Cardiac Transplantation  [facility]

## 2017-04-05 NOTE — PLAN OF CARE
Problem: Goal Outcome Summary  Goal: Goal Outcome Summary  Outcome: Improving  D: Pt with worsening heart failure, on nipride gtt.  I/A:Pt did not tolerate initial titration of the nipride. He would drop his MAP with very small incremental changes. Given dig and lasix. Seemed to improve MAPs. Able to titrate nipride up to 1mcg/km/min in larger increments. Goal CI > 2 and keep MAP > 65. See VS  P: Continue to titrate as able. Possible removal of PA cath in morning. Possible ICD placement tomorrow.

## 2017-04-05 NOTE — PLAN OF CARE
Problem: Goal Outcome Summary  Goal: Goal Outcome Summary  Outcome: Improving  D: Admitted for CHF exacerbation  I/A: A/Ox4. Satting well on RA. Marilynn scores q4h. Nipride gtt infusing continuously, PO ramipril dose increased to 7.5mg and nipride decreased to 0.75mcg/kg/min per MD. CI goal > 2. Last CI 1.7, will continue to trend with change in medications. MAPs between 60-80. Up in room with SBA.    P: Monitor MARILYNN scores overnight, possibly dc swan in AM and have ICD placed.

## 2017-04-06 ENCOUNTER — APPOINTMENT (OUTPATIENT)
Dept: GENERAL RADIOLOGY | Facility: CLINIC | Age: 62
DRG: 222 | End: 2017-04-06
Attending: INTERNAL MEDICINE
Payer: COMMERCIAL

## 2017-04-06 LAB
ALBUMIN SERPL-MCNC: 3 G/DL (ref 3.4–5)
ALP SERPL-CCNC: 92 U/L (ref 40–150)
ALT SERPL W P-5'-P-CCNC: 40 U/L (ref 0–70)
ANION GAP SERPL CALCULATED.3IONS-SCNC: 8 MMOL/L (ref 3–14)
ANION GAP SERPL CALCULATED.3IONS-SCNC: 8 MMOL/L (ref 3–14)
AST SERPL W P-5'-P-CCNC: 26 U/L (ref 0–45)
BASE DEFICIT BLDV-SCNC: 0 MMOL/L
BASE EXCESS BLDV CALC-SCNC: 0.5 MMOL/L
BASE EXCESS BLDV CALC-SCNC: 0.9 MMOL/L
BASE EXCESS BLDV CALC-SCNC: 1 MMOL/L
BASE EXCESS BLDV CALC-SCNC: 1.3 MMOL/L
BASE EXCESS BLDV CALC-SCNC: 1.9 MMOL/L
BASE EXCESS BLDV CALC-SCNC: 2.7 MMOL/L
BASE EXCESS BLDV CALC-SCNC: 2.9 MMOL/L
BILIRUB DIRECT SERPL-MCNC: 0.4 MG/DL (ref 0–0.2)
BILIRUB SERPL-MCNC: 0.9 MG/DL (ref 0.2–1.3)
BUN SERPL-MCNC: 10 MG/DL (ref 7–30)
BUN SERPL-MCNC: 11 MG/DL (ref 7–30)
CALCIUM SERPL-MCNC: 7.9 MG/DL (ref 8.5–10.1)
CALCIUM SERPL-MCNC: 8.3 MG/DL (ref 8.5–10.1)
CHLORIDE SERPL-SCNC: 101 MMOL/L (ref 94–109)
CHLORIDE SERPL-SCNC: 102 MMOL/L (ref 94–109)
CO2 SERPL-SCNC: 24 MMOL/L (ref 20–32)
CO2 SERPL-SCNC: 25 MMOL/L (ref 20–32)
CREAT SERPL-MCNC: 0.65 MG/DL (ref 0.66–1.25)
CREAT SERPL-MCNC: 0.71 MG/DL (ref 0.66–1.25)
ERYTHROCYTE [DISTWIDTH] IN BLOOD BY AUTOMATED COUNT: 14.8 % (ref 10–15)
GFR SERPL CREATININE-BSD FRML MDRD: ABNORMAL ML/MIN/1.7M2
GFR SERPL CREATININE-BSD FRML MDRD: ABNORMAL ML/MIN/1.7M2
GLUCOSE SERPL-MCNC: 110 MG/DL (ref 70–99)
GLUCOSE SERPL-MCNC: 92 MG/DL (ref 70–99)
HCO3 BLDV-SCNC: 25 MMOL/L (ref 21–28)
HCO3 BLDV-SCNC: 26 MMOL/L (ref 21–28)
HCO3 BLDV-SCNC: 26 MMOL/L (ref 21–28)
HCO3 BLDV-SCNC: 27 MMOL/L (ref 21–28)
HCO3 BLDV-SCNC: 27 MMOL/L (ref 21–28)
HCO3 BLDV-SCNC: 28 MMOL/L (ref 21–28)
HCT VFR BLD AUTO: 47.4 % (ref 40–53)
HGB BLD-MCNC: 15.5 G/DL (ref 13.3–17.7)
MAGNESIUM SERPL-MCNC: 2.2 MG/DL (ref 1.6–2.3)
MAGNESIUM SERPL-MCNC: 2.3 MG/DL (ref 1.6–2.3)
MCH RBC QN AUTO: 32.4 PG (ref 26.5–33)
MCHC RBC AUTO-ENTMCNC: 32.7 G/DL (ref 31.5–36.5)
MCV RBC AUTO: 99 FL (ref 78–100)
O2/TOTAL GAS SETTING VFR VENT: 21 %
OXYHGB MFR BLDV: 57 %
OXYHGB MFR BLDV: 58 %
OXYHGB MFR BLDV: 60 %
OXYHGB MFR BLDV: 61 %
OXYHGB MFR BLDV: 64 %
OXYHGB MFR BLDV: 65 %
OXYHGB MFR BLDV: 65 %
OXYHGB MFR BLDV: 68 %
PCO2 BLDV: 37 MM HG (ref 40–50)
PCO2 BLDV: 39 MM HG (ref 40–50)
PCO2 BLDV: 40 MM HG (ref 40–50)
PCO2 BLDV: 40 MM HG (ref 40–50)
PCO2 BLDV: 41 MM HG (ref 40–50)
PCO2 BLDV: 42 MM HG (ref 40–50)
PCO2 BLDV: 42 MM HG (ref 40–50)
PCO2 BLDV: 44 MM HG (ref 40–50)
PH BLDV: 7.39 PH (ref 7.32–7.43)
PH BLDV: 7.41 PH (ref 7.32–7.43)
PH BLDV: 7.41 PH (ref 7.32–7.43)
PH BLDV: 7.42 PH (ref 7.32–7.43)
PH BLDV: 7.42 PH (ref 7.32–7.43)
PH BLDV: 7.43 PH (ref 7.32–7.43)
PH BLDV: 7.43 PH (ref 7.32–7.43)
PH BLDV: 7.44 PH (ref 7.32–7.43)
PLATELET # BLD AUTO: 204 10E9/L (ref 150–450)
PO2 BLDV: 30 MM HG (ref 25–47)
PO2 BLDV: 31 MM HG (ref 25–47)
PO2 BLDV: 32 MM HG (ref 25–47)
PO2 BLDV: 34 MM HG (ref 25–47)
PO2 BLDV: 35 MM HG (ref 25–47)
PO2 BLDV: 38 MM HG (ref 25–47)
POTASSIUM SERPL-SCNC: 4.1 MMOL/L (ref 3.4–5.3)
POTASSIUM SERPL-SCNC: 4.4 MMOL/L (ref 3.4–5.3)
PROT SERPL-MCNC: 7 G/DL (ref 6.8–8.8)
RBC # BLD AUTO: 4.78 10E12/L (ref 4.4–5.9)
SODIUM SERPL-SCNC: 133 MMOL/L (ref 133–144)
SODIUM SERPL-SCNC: 134 MMOL/L (ref 133–144)
WBC # BLD AUTO: 9.5 10E9/L (ref 4–11)

## 2017-04-06 PROCEDURE — 94660 CPAP INITIATION&MGMT: CPT

## 2017-04-06 PROCEDURE — 25000132 ZZH RX MED GY IP 250 OP 250 PS 637: Performed by: INTERNAL MEDICINE

## 2017-04-06 PROCEDURE — 99232 SBSQ HOSP IP/OBS MODERATE 35: CPT | Mod: GC | Performed by: INTERNAL MEDICINE

## 2017-04-06 PROCEDURE — 25000132 ZZH RX MED GY IP 250 OP 250 PS 637: Performed by: STUDENT IN AN ORGANIZED HEALTH CARE EDUCATION/TRAINING PROGRAM

## 2017-04-06 PROCEDURE — 71010 XR CHEST PORT 1 VW: CPT

## 2017-04-06 PROCEDURE — 82805 BLOOD GASES W/O2 SATURATION: CPT | Performed by: INTERNAL MEDICINE

## 2017-04-06 PROCEDURE — 85027 COMPLETE CBC AUTOMATED: CPT | Performed by: INTERNAL MEDICINE

## 2017-04-06 PROCEDURE — 40000809 ZZH STATISTIC NO DOCUMENTATION TO SUPPORT CHARGE

## 2017-04-06 PROCEDURE — 25000128 H RX IP 250 OP 636: Performed by: INTERNAL MEDICINE

## 2017-04-06 PROCEDURE — 20000004 ZZH R&B ICU UMMC

## 2017-04-06 PROCEDURE — 83735 ASSAY OF MAGNESIUM: CPT | Performed by: INTERNAL MEDICINE

## 2017-04-06 PROCEDURE — 80076 HEPATIC FUNCTION PANEL: CPT | Performed by: INTERNAL MEDICINE

## 2017-04-06 PROCEDURE — 25000125 ZZHC RX 250: Performed by: STUDENT IN AN ORGANIZED HEALTH CARE EDUCATION/TRAINING PROGRAM

## 2017-04-06 PROCEDURE — 80048 BASIC METABOLIC PNL TOTAL CA: CPT | Performed by: INTERNAL MEDICINE

## 2017-04-06 PROCEDURE — 25000125 ZZHC RX 250: Performed by: INTERNAL MEDICINE

## 2017-04-06 RX ORDER — HYDRALAZINE HYDROCHLORIDE 25 MG/1
25 TABLET, FILM COATED ORAL ONCE
Status: COMPLETED | OUTPATIENT
Start: 2017-04-06 | End: 2017-04-06

## 2017-04-06 RX ORDER — HYDRALAZINE HYDROCHLORIDE 25 MG/1
50 TABLET, FILM COATED ORAL 3 TIMES DAILY
Status: DISCONTINUED | OUTPATIENT
Start: 2017-04-06 | End: 2017-04-06

## 2017-04-06 RX ORDER — HYDRALAZINE HYDROCHLORIDE 25 MG/1
100 TABLET, FILM COATED ORAL 3 TIMES DAILY
Status: DISCONTINUED | OUTPATIENT
Start: 2017-04-07 | End: 2017-04-08 | Stop reason: HOSPADM

## 2017-04-06 RX ORDER — ISOSORBIDE DINITRATE 40 MG/1
40 TABLET ORAL
Status: DISCONTINUED | OUTPATIENT
Start: 2017-04-07 | End: 2017-04-06

## 2017-04-06 RX ORDER — OXYMETAZOLINE HYDROCHLORIDE 0.05 G/100ML
2 SPRAY NASAL CONTINUOUS PRN
Status: DISCONTINUED | OUTPATIENT
Start: 2017-04-06 | End: 2017-04-06

## 2017-04-06 RX ORDER — HYDRALAZINE HYDROCHLORIDE 20 MG/ML
10 INJECTION INTRAMUSCULAR; INTRAVENOUS ONCE
Status: COMPLETED | OUTPATIENT
Start: 2017-04-06 | End: 2017-04-06

## 2017-04-06 RX ORDER — ISOSORBIDE DINITRATE 40 MG/1
40 TABLET ORAL
Status: DISCONTINUED | OUTPATIENT
Start: 2017-04-06 | End: 2017-04-08 | Stop reason: HOSPADM

## 2017-04-06 RX ORDER — HYDRALAZINE HYDROCHLORIDE 25 MG/1
75 TABLET, FILM COATED ORAL 3 TIMES DAILY
Status: DISCONTINUED | OUTPATIENT
Start: 2017-04-06 | End: 2017-04-06

## 2017-04-06 RX ORDER — ISOSORBIDE DINITRATE 20 MG/1
20 TABLET ORAL
Status: DISCONTINUED | OUTPATIENT
Start: 2017-04-06 | End: 2017-04-06

## 2017-04-06 RX ORDER — FUROSEMIDE 10 MG/ML
40 INJECTION INTRAMUSCULAR; INTRAVENOUS ONCE
Status: COMPLETED | OUTPATIENT
Start: 2017-04-06 | End: 2017-04-06

## 2017-04-06 RX ORDER — TRAZODONE HYDROCHLORIDE 50 MG/1
100 TABLET, FILM COATED ORAL
Status: DISCONTINUED | OUTPATIENT
Start: 2017-04-06 | End: 2017-04-08 | Stop reason: HOSPADM

## 2017-04-06 RX ADMIN — HYDRALAZINE HYDROCHLORIDE 25 MG: 25 TABLET ORAL at 16:26

## 2017-04-06 RX ADMIN — Medication 100 MG: at 08:10

## 2017-04-06 RX ADMIN — FUROSEMIDE 40 MG: 10 INJECTION, SOLUTION INTRAVENOUS at 09:46

## 2017-04-06 RX ADMIN — ISOSORBIDE DINITRATE 20 MG: 20 TABLET ORAL at 16:26

## 2017-04-06 RX ADMIN — METOPROLOL SUCCINATE 37.5 MG: 25 TABLET, EXTENDED RELEASE ORAL at 08:11

## 2017-04-06 RX ADMIN — RAMIPRIL 7.5 MG: 5 CAPSULE ORAL at 08:11

## 2017-04-06 RX ADMIN — ISOSORBIDE DINITRATE 20 MG: 20 TABLET ORAL at 06:24

## 2017-04-06 RX ADMIN — HYDRALAZINE HYDROCHLORIDE 50 MG: 25 TABLET ORAL at 06:17

## 2017-04-06 RX ADMIN — MULTIPLE VITAMINS W/ MINERALS TAB 1 TABLET: TAB at 08:10

## 2017-04-06 RX ADMIN — SPIRONOLACTONE 25 MG: 25 TABLET ORAL at 08:10

## 2017-04-06 RX ADMIN — HYDRALAZINE HYDROCHLORIDE 25 MG: 25 TABLET ORAL at 21:33

## 2017-04-06 RX ADMIN — DIGOXIN 250 MCG: 125 TABLET ORAL at 13:27

## 2017-04-06 RX ADMIN — HYDRALAZINE HYDROCHLORIDE 75 MG: 25 TABLET ORAL at 19:56

## 2017-04-06 RX ADMIN — RAMIPRIL 7.5 MG: 5 CAPSULE ORAL at 19:56

## 2017-04-06 RX ADMIN — ASPIRIN 81 MG: 81 TABLET, COATED ORAL at 08:10

## 2017-04-06 RX ADMIN — HYDRALAZINE HYDROCHLORIDE 50 MG: 25 TABLET ORAL at 13:27

## 2017-04-06 RX ADMIN — TRAZODONE HYDROCHLORIDE 100 MG: 50 TABLET ORAL at 22:18

## 2017-04-06 RX ADMIN — SODIUM NITROPRUSSIDE 0.25 MCG/KG/MIN: 25 INJECTION, SOLUTION, CONCENTRATE INTRAVENOUS at 00:23

## 2017-04-06 RX ADMIN — HYDRALAZINE HYDROCHLORIDE 10 MG: 20 INJECTION INTRAMUSCULAR; INTRAVENOUS at 00:16

## 2017-04-06 RX ADMIN — ISOSORBIDE DINITRATE 40 MG: 20 TABLET ORAL at 21:33

## 2017-04-06 RX ADMIN — ISOSORBIDE DINITRATE 20 MG: 20 TABLET ORAL at 11:07

## 2017-04-06 RX ADMIN — SIMVASTATIN 20 MG: 20 TABLET, FILM COATED ORAL at 19:56

## 2017-04-06 NOTE — PLAN OF CARE
Problem: Goal Outcome Summary  Goal: Goal Outcome Summary  Outcome: Improving  D: Admitted for CHF exacerbation   I/A: Adjusting PO medications to maintain CI >/= 1.8. Calculating MARILYNN q2h this shift, ranging from 1.5 - 1.8. 40mg IV lasix given with 1L UO. Fluid restriction modified to 2L. Up in room with SBA for lines. Pt continues to express frustration re: having swan in place and mobility limited. Reassurance provided.   P: Await determination of when swan can be removed. Monitor hemodynamic status closely. NPO at 0000 for possible ICD placement tomorrow if swan is able to be removed. Pt and family aware of plan of care.

## 2017-04-06 NOTE — PROGRESS NOTES
"      [facility]   Cardiology 2  Daily Progress Note      Date of Admission: 3/29/2017  Date of Service: 04/06/2017    Danielito Chu MRN# [id number]   Age: 61 year old YOB: 1955     Interval History   Feels well.  Anxious to get swan out - but understands the need for close monitoring.  Feels well.  No shortness of breath or chest pain.  Feels tied down to the bed due to his lines.  Otherwise denies any other complaints    April 6, 2017 HD        CI  SVR  0400      1.8   1280  1200      1.8  1280  1400      1.6   1400    Medications     Medications and allergies reviewed in [facility].  Changes are reflected in the assessment and plan.      Current Meds:  Nipride gtt discontinued 4/6.      Ramipril 7.5 mg BID   Hydralazine 50 mg TID.    Isordil 20 mg TID.    Toprol XL 37.5 daily.    Digoxin 250 mcg  Spironolactone 25 mg daily.    ASA 81 mg daily.      Home meds:    Ramipril 2.5 mg daily.    Digoxin 0.25 mg daily.    Toprolol XL 75 mg daily.    ASA 81 mg   Lasix 40 mg BID.   Spironolactone 25 mg daily.    Simvastatin 20 mg.        Physical Exam   BP 97/70  Pulse 74  Temp 97.6  F (36.4  C) (Axillary)  Resp 9  Ht 1.765 m (5' 9.5\")  Wt 102.1 kg (225 lb 1.4 oz)  SpO2 97%  BMI 32.76 kg/m2    Wt Readings from Last 1 Encounters:   04/06/17 102.1 kg (225 lb 1.4 oz)    Body mass index is 32.76 kg/(m^2).     Physical Exam   Constitutional: He is oriented to person, place, and time and well-developed, well-nourished, and in no distress. No distress.   HENT:   Head: Normocephalic.   Mouth/Throat: Oropharynx is clear and moist. No oropharyngeal exudate.   Eyes: Conjunctivae and EOM are normal. Pupils are equal, round, and reactive to light. No scleral icterus.   Neck: Normal range of motion. Neck supple.   PA catheter in RIJ   Cardiovascular: Normal rate and regular rhythm.  Exam reveals no gallop and no friction rub.    No murmur heard.  Pulmonary/Chest: Effort normal and breath sounds normal. No " respiratory distress. He has no wheezes.   Abdominal: Soft. Bowel sounds are normal. He exhibits no distension. There is no tenderness.   Musculoskeletal: Normal range of motion. He exhibits no edema.   Neurological: He is alert and oriented to person, place, and time.   Skin: Skin is warm and dry. He is not diaphoretic. No erythema.     Drains and Tubes: none.      Intravascular Access and Device:   Radial artery line - placed 4/3.    Left PIV.    PA cath in right IJ - placed 4/3    Intake/Output:  1090 In/1200 out.      Data     Labs & Studies of Note: I personally reviewed the labs in [facility].      Creatinine stable   Hg stable    Imaging:   Recent Results (from the past 24 hour(s))   XR Chest Port 1 View    Narrative    XR CHEST PORT 1 VW  4/6/2017 6:00 AM      HISTORY: interval exam for wan.    COMPARISON: Chest x-ray on 4/5/2017    FINDINGS:   Single semiupright AP view of the chest was obtained.   Support devices: The tip of right IJ [place]-Soy catheter projects over  the right main pulmonary artery.   Stable enlarged cardiomediastinal silhouette. Perihilar haziness.  Increased pulmonary markings. No appreciable pneumothorax or pleural  effusion. No acute osseous abnormality. Visualized upper abdomen  unremarkable. No focal airspace opacity.      Impression    IMPRESSION:   Increasing perihilar haziness and pulmonary markings suggest pulmonary  vessel congestion.    I have personally reviewed the examination and initial interpretation  and I agree with the findings.    COLLEEN SANDOVAL MD       Main Plans for Today   Q4 hour Hemodynamics.    - Nipride gtt discontinued.   - Monitor more closely this afternoon and if CI ~1.8-2 plan to remove swan, but if he continues to have low CI may need inotrope support.        Assessment & Plan      Mr. Danielito Chu is a 62 y/o M with pmh significant for HFrEF (last EF 15% ) 2/2 to unknown etiology (presumed non ischemic) , mild-moderate MR, pre-diabetes, former tobacco use  (0.3 packs for 37 years), untreated sleep apnea (central and obstructive), HTN, hepatic congestion 2/2 CHF, who presents as a direct admission from [place], [place] for further CHF treatment and workup.      Acute on Chronic HFrEF (last EF 15%) 2/2 NICM/Cardiogenic shock:  Mild-moderate MR  HTN  Idiopathic non ischemic cardiomyopathy - ? Viral.  TSH elevated - Free T4 is normal.  HIV negative.  Normal iron panel, Ferritin of 290    Coronary angiogram 4/3:- No ischemic disease.    RHC 4/3:  CI 1.6 by Timmy.  RA 12, PA 63/35(48), PAW 25, SVR 2000  Hemodynamics 4/5: See above for trend.  This afternoon CI 1.8, CVP 10, SVR 1200.    - Afterload: Nipride gtt discontinued 4/6.  Hydralazine 50 mg TID, Isordil 20 bubba TID - started 4/6    - Preload: Lasix 40 mg IV 4/6 - dose as needed daily based on CVP  - AceI: Ramiprilincreased to 7.5 mg BID (4/5)  - Aldosterone antagonist: Spironolactone 25 mg qday   - BB: Toprol XL 37.5 mg daily.    - SCD ppx: - Plan for ICD pending swan removal and optimizing heart failure medications - EP following.    - ASA 81 daily  - Digoxin 0.25mg daily  - Toprol XL 37.5   - Simvastatin 20mg daily  - Strict I/O's, daily weights, cardiac diet    Congestive hepatopathy (now resolving)  Likely 2/2 to above  - Abdominal US negative   - Continue to trend LFTs  ------------------------------------------------------------------------------------------------------------------  Prophylaxis:     -GI: None.      -DVT: Heparin 5000 units subq - hold in anticipation for ICD, SCDs    FEN: cardiac diet, NPO at midnight for possible ICD.    Consults: EP for ICD  Family: Updated at bedside.    Code status: Full Code.    Disposition: Pending optimization of heart failure therapies and transfer to the floor.       =========================================================  Patient was discussed and evaluated with Dr. Rene.      [facility]   Internal Medicine PGY2        I have reviewed today's vital signs,  notes, medications, labs and imaging.  I have also seen and examined the patient and agree with the findings and plan as outlined above.  Pt without complaints.  VSS with CI ranging from 1.6 to 2.1. Lungs clear and S1 and S2 with soft S3.  Labs with Cr 0.65 and WBC 9.5.  Assessment: Pt with advanced heart failure with marginal cardiac index on oral afterload reducing agents.  Pt may need MCS.  Will begin education.  Pt seen X2 and total critical care time 30 min.      Bennett Rene MD, PhD  Professor, Heart Failure and Cardiac Transplantation  [facility]

## 2017-04-06 NOTE — PLAN OF CARE
Problem: Goal Outcome Summary  Goal: Goal Outcome Summary  Outcome: Therapy, progress toward functional goals is gradual  D: A&Ox4, has denied having pain. VSS maintaining BP MAP's 70-80. Adequate UOP 60-70cc/h overnight.  A/I: [place] in place for cardiac hemodynamic monitoring. Nipride drip titrated off, hydralazine and isosorbide ordered to keep CI>2.   P: Continue MARILYNN monitoring Q4h. Keep provider updated. NPO since MN for possible ICD placement today.

## 2017-04-07 ENCOUNTER — APPOINTMENT (OUTPATIENT)
Dept: ULTRASOUND IMAGING | Facility: CLINIC | Age: 62
DRG: 222 | End: 2017-04-07
Attending: INTERNAL MEDICINE
Payer: COMMERCIAL

## 2017-04-07 ENCOUNTER — APPOINTMENT (OUTPATIENT)
Dept: CT IMAGING | Facility: CLINIC | Age: 62
DRG: 222 | End: 2017-04-07
Attending: INTERNAL MEDICINE
Payer: COMMERCIAL

## 2017-04-07 ENCOUNTER — APPOINTMENT (OUTPATIENT)
Dept: CARDIOLOGY | Facility: CLINIC | Age: 62
DRG: 222 | End: 2017-04-07
Attending: NURSE PRACTITIONER
Payer: COMMERCIAL

## 2017-04-07 ENCOUNTER — APPOINTMENT (OUTPATIENT)
Dept: GENERAL RADIOLOGY | Facility: CLINIC | Age: 62
DRG: 222 | End: 2017-04-07
Attending: NURSE PRACTITIONER
Payer: COMMERCIAL

## 2017-04-07 ENCOUNTER — APPOINTMENT (OUTPATIENT)
Dept: GENERAL RADIOLOGY | Facility: CLINIC | Age: 62
DRG: 222 | End: 2017-04-07
Attending: INTERNAL MEDICINE
Payer: COMMERCIAL

## 2017-04-07 LAB
ALBUMIN UR-MCNC: NEGATIVE MG/DL
ANION GAP SERPL CALCULATED.3IONS-SCNC: 8 MMOL/L (ref 3–14)
ANION GAP SERPL CALCULATED.3IONS-SCNC: 9 MMOL/L (ref 3–14)
APPEARANCE UR: CLEAR
BASE EXCESS BLDV CALC-SCNC: 0.1 MMOL/L
BASE EXCESS BLDV CALC-SCNC: 0.5 MMOL/L
BASE EXCESS BLDV CALC-SCNC: 0.7 MMOL/L
BASE EXCESS BLDV CALC-SCNC: 1.2 MMOL/L
BILIRUB UR QL STRIP: NEGATIVE
BUN SERPL-MCNC: 13 MG/DL (ref 7–30)
BUN SERPL-MCNC: 13 MG/DL (ref 7–30)
CALCIUM SERPL-MCNC: 7.9 MG/DL (ref 8.5–10.1)
CALCIUM SERPL-MCNC: 8.4 MG/DL (ref 8.5–10.1)
CHLORIDE SERPL-SCNC: 102 MMOL/L (ref 94–109)
CHLORIDE SERPL-SCNC: 103 MMOL/L (ref 94–109)
CO2 SERPL-SCNC: 24 MMOL/L (ref 20–32)
CO2 SERPL-SCNC: 25 MMOL/L (ref 20–32)
COLOR UR AUTO: YELLOW
CREAT SERPL-MCNC: 0.72 MG/DL (ref 0.66–1.25)
CREAT SERPL-MCNC: 0.9 MG/DL (ref 0.66–1.25)
DIGOXIN SERPL-MCNC: 0.8 UG/L (ref 0.5–2)
ERYTHROCYTE [DISTWIDTH] IN BLOOD BY AUTOMATED COUNT: 14.8 % (ref 10–15)
GFR SERPL CREATININE-BSD FRML MDRD: 86 ML/MIN/1.7M2
GFR SERPL CREATININE-BSD FRML MDRD: ABNORMAL ML/MIN/1.7M2
GLUCOSE SERPL-MCNC: 117 MG/DL (ref 70–99)
GLUCOSE SERPL-MCNC: 142 MG/DL (ref 70–99)
GLUCOSE UR STRIP-MCNC: NEGATIVE MG/DL
HCO3 BLDV-SCNC: 25 MMOL/L (ref 21–28)
HCO3 BLDV-SCNC: 26 MMOL/L (ref 21–28)
HCT VFR BLD AUTO: 44.1 % (ref 40–53)
HEMOCCULT STL QL: NEGATIVE
HGB BLD-MCNC: 14.1 G/DL (ref 13.3–17.7)
HGB UR QL STRIP: NEGATIVE
KETONES UR STRIP-MCNC: NEGATIVE MG/DL
LEUKOCYTE ESTERASE UR QL STRIP: ABNORMAL
MAGNESIUM SERPL-MCNC: 2.2 MG/DL (ref 1.6–2.3)
MAGNESIUM SERPL-MCNC: 2.4 MG/DL (ref 1.6–2.3)
MCH RBC QN AUTO: 31.7 PG (ref 26.5–33)
MCHC RBC AUTO-ENTMCNC: 32 G/DL (ref 31.5–36.5)
MCV RBC AUTO: 99 FL (ref 78–100)
NITRATE UR QL: NEGATIVE
O2/TOTAL GAS SETTING VFR VENT: 21 %
OXYHGB MFR BLDV: 62 %
OXYHGB MFR BLDV: 62 %
OXYHGB MFR BLDV: 63 %
OXYHGB MFR BLDV: 65 %
PCO2 BLDV: 37 MM HG (ref 40–50)
PCO2 BLDV: 37 MM HG (ref 40–50)
PCO2 BLDV: 39 MM HG (ref 40–50)
PCO2 BLDV: 39 MM HG (ref 40–50)
PH BLDV: 7.41 PH (ref 7.32–7.43)
PH BLDV: 7.43 PH (ref 7.32–7.43)
PH BLDV: 7.43 PH (ref 7.32–7.43)
PH BLDV: 7.44 PH (ref 7.32–7.43)
PH UR STRIP: 6.5 PH (ref 5–7)
PLATELET # BLD AUTO: 232 10E9/L (ref 150–450)
PO2 BLDV: 33 MM HG (ref 25–47)
PO2 BLDV: 34 MM HG (ref 25–47)
POTASSIUM SERPL-SCNC: 4 MMOL/L (ref 3.4–5.3)
POTASSIUM SERPL-SCNC: 4.4 MMOL/L (ref 3.4–5.3)
RBC # BLD AUTO: 4.45 10E12/L (ref 4.4–5.9)
RBC #/AREA URNS AUTO: <1 /HPF (ref 0–2)
SODIUM SERPL-SCNC: 135 MMOL/L (ref 133–144)
SODIUM SERPL-SCNC: 136 MMOL/L (ref 133–144)
SP GR UR STRIP: 1.02 (ref 1–1.03)
URN SPEC COLLECT METH UR: ABNORMAL
UROBILINOGEN UR STRIP-MCNC: NORMAL MG/DL (ref 0–2)
WBC # BLD AUTO: 8.4 10E9/L (ref 4–11)
WBC #/AREA URNS AUTO: 9 /HPF (ref 0–2)

## 2017-04-07 PROCEDURE — 99153 MOD SED SAME PHYS/QHP EA: CPT | Performed by: INTERNAL MEDICINE

## 2017-04-07 PROCEDURE — 83735 ASSAY OF MAGNESIUM: CPT | Performed by: INTERNAL MEDICINE

## 2017-04-07 PROCEDURE — 99232 SBSQ HOSP IP/OBS MODERATE 35: CPT | Mod: 24 | Performed by: INTERNAL MEDICINE

## 2017-04-07 PROCEDURE — 25000132 ZZH RX MED GY IP 250 OP 250 PS 637: Performed by: STUDENT IN AN ORGANIZED HEALTH CARE EDUCATION/TRAINING PROGRAM

## 2017-04-07 PROCEDURE — 71010 XR CHEST PORT 1 VW: CPT | Mod: 77

## 2017-04-07 PROCEDURE — 80048 BASIC METABOLIC PNL TOTAL CA: CPT | Performed by: INTERNAL MEDICINE

## 2017-04-07 PROCEDURE — 27210795 ZZH PAD DEFIB QUICK CR4

## 2017-04-07 PROCEDURE — 02HK3KZ INSERTION OF DEFIBRILLATOR LEAD INTO RIGHT VENTRICLE, PERCUTANEOUS APPROACH: ICD-10-PCS | Performed by: INTERNAL MEDICINE

## 2017-04-07 PROCEDURE — 25000125 ZZHC RX 250: Performed by: NURSE PRACTITIONER

## 2017-04-07 PROCEDURE — 40000048 ZZH STATISTIC DAILY SWAN MONITORING

## 2017-04-07 PROCEDURE — 81001 URINALYSIS AUTO W/SCOPE: CPT | Performed by: INTERNAL MEDICINE

## 2017-04-07 PROCEDURE — 25000132 ZZH RX MED GY IP 250 OP 250 PS 637: Performed by: INTERNAL MEDICINE

## 2017-04-07 PROCEDURE — 99153 MOD SED SAME PHYS/QHP EA: CPT

## 2017-04-07 PROCEDURE — 76700 US EXAM ABDOM COMPLETE: CPT

## 2017-04-07 PROCEDURE — 0JH608Z INSERTION OF DEFIBRILLATOR GENERATOR INTO CHEST SUBCUTANEOUS TISSUE AND FASCIA, OPEN APPROACH: ICD-10-PCS | Performed by: INTERNAL MEDICINE

## 2017-04-07 PROCEDURE — C1722 AICD, SINGLE CHAMBER: HCPCS

## 2017-04-07 PROCEDURE — 27210784 ZZH KIT PACEMAKER CR8

## 2017-04-07 PROCEDURE — 71010 XR CHEST PORT 1 VW: CPT

## 2017-04-07 PROCEDURE — 36415 COLL VENOUS BLD VENIPUNCTURE: CPT | Performed by: INTERNAL MEDICINE

## 2017-04-07 PROCEDURE — 33249 INSJ/RPLCMT DEFIB W/LEAD(S): CPT | Mod: Q0 | Performed by: INTERNAL MEDICINE

## 2017-04-07 PROCEDURE — 93922 UPR/L XTREMITY ART 2 LEVELS: CPT

## 2017-04-07 PROCEDURE — 82272 OCCULT BLD FECES 1-3 TESTS: CPT | Performed by: INTERNAL MEDICINE

## 2017-04-07 PROCEDURE — 25000128 H RX IP 250 OP 636: Performed by: NURSE PRACTITIONER

## 2017-04-07 PROCEDURE — 99152 MOD SED SAME PHYS/QHP 5/>YRS: CPT | Performed by: INTERNAL MEDICINE

## 2017-04-07 PROCEDURE — 33249 INSJ/RPLCMT DEFIB W/LEAD(S): CPT

## 2017-04-07 PROCEDURE — 82805 BLOOD GASES W/O2 SATURATION: CPT | Performed by: INTERNAL MEDICINE

## 2017-04-07 PROCEDURE — 21400006 ZZH R&B CCU INTERMEDIATE UMMC

## 2017-04-07 PROCEDURE — 40000196 ZZH STATISTIC RAPCV CVP MONITORING

## 2017-04-07 PROCEDURE — 93925 LOWER EXTREMITY STUDY: CPT

## 2017-04-07 PROCEDURE — 70450 CT HEAD/BRAIN W/O DYE: CPT

## 2017-04-07 PROCEDURE — 87086 URINE CULTURE/COLONY COUNT: CPT | Performed by: INTERNAL MEDICINE

## 2017-04-07 PROCEDURE — C1895 LEAD, AICD, ENDO DUAL COIL: HCPCS

## 2017-04-07 PROCEDURE — 85027 COMPLETE CBC AUTOMATED: CPT | Performed by: INTERNAL MEDICINE

## 2017-04-07 PROCEDURE — 99152 MOD SED SAME PHYS/QHP 5/>YRS: CPT

## 2017-04-07 PROCEDURE — 93010 ELECTROCARDIOGRAM REPORT: CPT | Performed by: INTERNAL MEDICINE

## 2017-04-07 PROCEDURE — 80162 ASSAY OF DIGOXIN TOTAL: CPT | Performed by: INTERNAL MEDICINE

## 2017-04-07 PROCEDURE — 93005 ELECTROCARDIOGRAM TRACING: CPT

## 2017-04-07 RX ORDER — CEFAZOLIN SODIUM 2 G/100ML
2 INJECTION, SOLUTION INTRAVENOUS
Status: COMPLETED | OUTPATIENT
Start: 2017-04-07 | End: 2017-04-07

## 2017-04-07 RX ORDER — DIPHENHYDRAMINE HYDROCHLORIDE 50 MG/ML
25-50 INJECTION INTRAMUSCULAR; INTRAVENOUS
Status: DISCONTINUED | OUTPATIENT
Start: 2017-04-07 | End: 2017-04-07 | Stop reason: HOSPADM

## 2017-04-07 RX ORDER — FLUMAZENIL 0.1 MG/ML
0.2 INJECTION, SOLUTION INTRAVENOUS
Status: DISCONTINUED | OUTPATIENT
Start: 2017-04-07 | End: 2017-04-07 | Stop reason: HOSPADM

## 2017-04-07 RX ORDER — FENTANYL CITRATE 50 UG/ML
25-50 INJECTION, SOLUTION INTRAMUSCULAR; INTRAVENOUS
Status: DISCONTINUED | OUTPATIENT
Start: 2017-04-07 | End: 2017-04-07 | Stop reason: HOSPADM

## 2017-04-07 RX ORDER — DOBUTAMINE HYDROCHLORIDE 200 MG/100ML
5-40 INJECTION INTRAVENOUS CONTINUOUS PRN
Status: DISCONTINUED | OUTPATIENT
Start: 2017-04-07 | End: 2017-04-07 | Stop reason: HOSPADM

## 2017-04-07 RX ORDER — ONDANSETRON 2 MG/ML
4 INJECTION INTRAMUSCULAR; INTRAVENOUS EVERY 4 HOURS PRN
Status: DISCONTINUED | OUTPATIENT
Start: 2017-04-07 | End: 2017-04-07 | Stop reason: HOSPADM

## 2017-04-07 RX ORDER — HEPARIN SODIUM 1000 [USP'U]/ML
1000-10000 INJECTION, SOLUTION INTRAVENOUS; SUBCUTANEOUS EVERY 5 MIN PRN
Status: DISCONTINUED | OUTPATIENT
Start: 2017-04-07 | End: 2017-04-07 | Stop reason: HOSPADM

## 2017-04-07 RX ORDER — CEPHALEXIN 500 MG/1
500 CAPSULE ORAL EVERY 6 HOURS SCHEDULED
Status: DISCONTINUED | OUTPATIENT
Start: 2017-04-08 | End: 2017-04-08 | Stop reason: HOSPADM

## 2017-04-07 RX ORDER — FUROSEMIDE 10 MG/ML
20-100 INJECTION INTRAMUSCULAR; INTRAVENOUS
Status: DISCONTINUED | OUTPATIENT
Start: 2017-04-07 | End: 2017-04-07 | Stop reason: HOSPADM

## 2017-04-07 RX ORDER — IOPAMIDOL 755 MG/ML
10 INJECTION, SOLUTION INTRAVASCULAR ONCE
Status: DISCONTINUED | OUTPATIENT
Start: 2017-04-07 | End: 2017-04-07

## 2017-04-07 RX ORDER — ATORVASTATIN CALCIUM 10 MG/1
10 TABLET, FILM COATED ORAL
Status: DISCONTINUED | OUTPATIENT
Start: 2017-04-07 | End: 2017-04-08 | Stop reason: HOSPADM

## 2017-04-07 RX ORDER — KETOROLAC TROMETHAMINE 15 MG/ML
15-30 INJECTION, SOLUTION INTRAMUSCULAR; INTRAVENOUS
Status: DISCONTINUED | OUTPATIENT
Start: 2017-04-07 | End: 2017-04-07 | Stop reason: HOSPADM

## 2017-04-07 RX ORDER — PROMETHAZINE HYDROCHLORIDE 25 MG/ML
6.25-25 INJECTION, SOLUTION INTRAMUSCULAR; INTRAVENOUS EVERY 4 HOURS PRN
Status: DISCONTINUED | OUTPATIENT
Start: 2017-04-07 | End: 2017-04-07 | Stop reason: HOSPADM

## 2017-04-07 RX ORDER — NALOXONE HYDROCHLORIDE 0.4 MG/ML
0.4 INJECTION, SOLUTION INTRAMUSCULAR; INTRAVENOUS; SUBCUTANEOUS EVERY 5 MIN PRN
Status: DISCONTINUED | OUTPATIENT
Start: 2017-04-07 | End: 2017-04-07 | Stop reason: HOSPADM

## 2017-04-07 RX ORDER — CEFAZOLIN SODIUM 2 G/100ML
2 INJECTION, SOLUTION INTRAVENOUS EVERY 8 HOURS
Status: COMPLETED | OUTPATIENT
Start: 2017-04-07 | End: 2017-04-08

## 2017-04-07 RX ORDER — PROTAMINE SULFATE 10 MG/ML
1-5 INJECTION, SOLUTION INTRAVENOUS
Status: DISCONTINUED | OUTPATIENT
Start: 2017-04-07 | End: 2017-04-07 | Stop reason: HOSPADM

## 2017-04-07 RX ORDER — ATROPINE SULFATE 0.1 MG/ML
.5-1 INJECTION INTRAVENOUS
Status: DISCONTINUED | OUTPATIENT
Start: 2017-04-07 | End: 2017-04-07 | Stop reason: HOSPADM

## 2017-04-07 RX ORDER — LORAZEPAM 2 MG/ML
.5-2 INJECTION INTRAMUSCULAR EVERY 10 MIN PRN
Status: DISCONTINUED | OUTPATIENT
Start: 2017-04-07 | End: 2017-04-07 | Stop reason: HOSPADM

## 2017-04-07 RX ORDER — ADENOSINE 3 MG/ML
6-12 INJECTION, SOLUTION INTRAVENOUS EVERY 5 MIN PRN
Status: DISCONTINUED | OUTPATIENT
Start: 2017-04-07 | End: 2017-04-07 | Stop reason: HOSPADM

## 2017-04-07 RX ORDER — PROTAMINE SULFATE 10 MG/ML
5-40 INJECTION, SOLUTION INTRAVENOUS EVERY 10 MIN PRN
Status: DISCONTINUED | OUTPATIENT
Start: 2017-04-07 | End: 2017-04-07 | Stop reason: HOSPADM

## 2017-04-07 RX ORDER — NALOXONE HYDROCHLORIDE 0.4 MG/ML
.1-.4 INJECTION, SOLUTION INTRAMUSCULAR; INTRAVENOUS; SUBCUTANEOUS
Status: DISCONTINUED | OUTPATIENT
Start: 2017-04-07 | End: 2017-04-08 | Stop reason: HOSPADM

## 2017-04-07 RX ORDER — LIDOCAINE 40 MG/G
CREAM TOPICAL
Status: DISCONTINUED | OUTPATIENT
Start: 2017-04-07 | End: 2017-04-08 | Stop reason: HOSPADM

## 2017-04-07 RX ORDER — LIDOCAINE 40 MG/G
CREAM TOPICAL
Status: DISCONTINUED | OUTPATIENT
Start: 2017-04-07 | End: 2017-04-07 | Stop reason: HOSPADM

## 2017-04-07 RX ORDER — SODIUM CHLORIDE 9 MG/ML
INJECTION, SOLUTION INTRAVENOUS CONTINUOUS
Status: DISCONTINUED | OUTPATIENT
Start: 2017-04-07 | End: 2017-04-07 | Stop reason: HOSPADM

## 2017-04-07 RX ADMIN — HYDRALAZINE HYDROCHLORIDE 100 MG: 25 TABLET ORAL at 14:59

## 2017-04-07 RX ADMIN — FENTANYL CITRATE 50 MCG: 50 INJECTION, SOLUTION INTRAMUSCULAR; INTRAVENOUS at 12:43

## 2017-04-07 RX ADMIN — MIDAZOLAM HYDROCHLORIDE 1 MG: 1 INJECTION, SOLUTION INTRAMUSCULAR; INTRAVENOUS at 12:13

## 2017-04-07 RX ADMIN — HYDRALAZINE HYDROCHLORIDE 100 MG: 25 TABLET ORAL at 19:15

## 2017-04-07 RX ADMIN — MIDAZOLAM HYDROCHLORIDE 1 MG: 1 INJECTION, SOLUTION INTRAMUSCULAR; INTRAVENOUS at 12:38

## 2017-04-07 RX ADMIN — ASPIRIN 81 MG: 81 TABLET, COATED ORAL at 08:01

## 2017-04-07 RX ADMIN — HYDRALAZINE HYDROCHLORIDE 100 MG: 25 TABLET ORAL at 07:50

## 2017-04-07 RX ADMIN — POTASSIUM CHLORIDE 20 MEQ: 750 TABLET, EXTENDED RELEASE ORAL at 06:11

## 2017-04-07 RX ADMIN — DIGOXIN 250 MCG: 125 TABLET ORAL at 14:59

## 2017-04-07 RX ADMIN — CEFAZOLIN SODIUM 2 G: 2 INJECTION, SOLUTION INTRAVENOUS at 12:05

## 2017-04-07 RX ADMIN — ISOSORBIDE DINITRATE 40 MG: 20 TABLET ORAL at 17:04

## 2017-04-07 RX ADMIN — ACETAMINOPHEN 650 MG: 325 TABLET, FILM COATED ORAL at 08:01

## 2017-04-07 RX ADMIN — FENTANYL CITRATE 50 MCG: 50 INJECTION, SOLUTION INTRAMUSCULAR; INTRAVENOUS at 12:38

## 2017-04-07 RX ADMIN — ISOSORBIDE DINITRATE 40 MG: 20 TABLET ORAL at 07:52

## 2017-04-07 RX ADMIN — SPIRONOLACTONE 25 MG: 25 TABLET ORAL at 07:50

## 2017-04-07 RX ADMIN — ATORVASTATIN CALCIUM 10 MG: 10 TABLET, FILM COATED ORAL at 19:08

## 2017-04-07 RX ADMIN — TRAZODONE HYDROCHLORIDE 100 MG: 50 TABLET ORAL at 23:07

## 2017-04-07 RX ADMIN — FENTANYL CITRATE 50 MCG: 50 INJECTION, SOLUTION INTRAMUSCULAR; INTRAVENOUS at 12:30

## 2017-04-07 RX ADMIN — RAMIPRIL 7.5 MG: 5 CAPSULE ORAL at 07:50

## 2017-04-07 RX ADMIN — Medication 100 MG: at 07:50

## 2017-04-07 RX ADMIN — MIDAZOLAM HYDROCHLORIDE 1 MG: 1 INJECTION, SOLUTION INTRAMUSCULAR; INTRAVENOUS at 12:42

## 2017-04-07 RX ADMIN — MULTIPLE VITAMINS W/ MINERALS TAB 1 TABLET: TAB at 07:50

## 2017-04-07 RX ADMIN — METOPROLOL SUCCINATE 37.5 MG: 25 TABLET, EXTENDED RELEASE ORAL at 07:52

## 2017-04-07 RX ADMIN — RAMIPRIL 7.5 MG: 5 CAPSULE ORAL at 19:14

## 2017-04-07 RX ADMIN — CEFAZOLIN SODIUM 2 G: 2 INJECTION, SOLUTION INTRAVENOUS at 19:09

## 2017-04-07 ASSESSMENT — PAIN DESCRIPTION - DESCRIPTORS: DESCRIPTORS: HEADACHE

## 2017-04-07 NOTE — PLAN OF CARE
Problem: Goal Outcome Summary  Goal: Goal Outcome Summary  Outcome: Improving  D: AVSS, A&Ox4 denies having pain. UOP 50-60/hr through the shift. No BM this shift.  I/A: Hydralazine and Isosorbide adjusted and additional medication given to increase CI > 1.8. Trazodone PRN for sleep dose increased d/t patient sleepless nights. Patient stated having a good night sleep.  P: Continue to monitor hemodynamic status closely. NPO at 0000 for possible ICD placement today. Pt and family updated through the night. Continue to follow POC intervene as needed.

## 2017-04-07 NOTE — PROGRESS NOTES
Cardiology 2 Service Progress Note    CC: NICM    Interval: [place] remained in place overnight with plans to optimize hemodynamics on oral afterload reduction therapy. CI has steadily increased from 1.5 to 1.9 with escalating doses of hydralazine. Will likely d/c swan this morning and attempt to have ICD placed this afternoon.    ROS: complete ROS per hpi otherwise negative    Vitals:  Tm 37.1  104/65, MAPs 70-80s  80  95% RA    Hemodynamics  CVP will obtain prior to rounds  PA   PCW  SaO2 96  SvO2 65  CI 1.9  CO 4.3  SVR 1200    I/O 1.2/ 2.1  Net negative 11L this admission    Weight: 232 (admit)-> 228 -> 225    Medications:  Hydralazine 100 TID  Isordil 40 TID  Ramapril 7.5 BID  Metoprolol XL 37.5  [place] 25  Lasix 40 x1  Asa 81  Dig 250  Thiamine    O/E:   Comf, NAD  [place] in place  Lungs CTAB  S1S2  JVP about 8-10 cm  Warm well perfused, no edema    CMP  Recent Labs  Lab 04/07/17  0355 04/06/17  1439 04/06/17  0508 04/05/17  1556 04/05/17  0356  04/04/17  0412  04/03/17  0741    133 134 131* 133  < > 132*  < > 129*   POTASSIUM 4.0 4.1 4.4 4.3 4.2  < > 3.7  < > 4.0   CHLORIDE 103 101 102 99 100  < > 95  < > 94   CO2 24 25 24 26 24  < > 28  < > 24   ANIONGAP 8 8 8 6 9  < > 9  < > 10   * 92 110* 114* 106*  < > 151*  < > 120*   BUN 13 11 10 11 13  < > 23  < > 19   CR 0.72 0.71 0.65* 0.72 0.68  < > 0.81  < > 0.87   GFRESTIMATED >90Non  GFR Calc >90Non  GFR Calc >90Non  GFR Calc >90Non  GFR Calc >90Non  GFR Calc  < > >90Non  GFR Calc  < > 89   GFRESTBLACK >90African American GFR Calc >90African American GFR Calc >90African American GFR Calc >90African American GFR Calc >90African American GFR Calc  < > >90African American GFR Calc  < > >90African American GFR Calc   ASHLEE 7.9* 8.3* 7.9* 7.9* 8.0*  < > 8.2*  < > 8.8   MAG 2.2 2.3 2.2 2.3 2.3  < > 2.4*  < >  --    PHOS  --   --   --   --   --   --  2.6  --   --     PROTTOTAL  --   --  7.0  --  6.6*  --  6.5*  --  8.0   ALBUMIN  --   --  3.0*  --  2.9*  --  2.8*  --  3.2*   BILITOTAL  --   --  0.9  --  0.9  --  1.4*  --  2.5*   ALKPHOS  --   --  92  --  83  --  94  --  124   AST  --   --  26  --  29  --  30  --  42   ALT  --   --  40  --  41  --  45  --  58   < > = values in this interval not displayed.  CBC  Recent Labs  Lab 04/07/17  0355 04/06/17  0508 04/05/17  0356 04/04/17  0412   WBC 8.4 9.5 9.5 10.5   RBC 4.45 4.78 4.54 4.68   HGB 14.1 15.5 14.7 15.2   HCT 44.1 47.4 45.2 46.8   MCV 99 99 100 100   MCH 31.7 32.4 32.4 32.5   MCHC 32.0 32.7 32.5 32.5   RDW 14.8 14.8 14.9 15.0    204 215 240     INRNo lab results found in last 7 days.  Arterial Blood Gas  Recent Labs  Lab 04/07/17  0355 04/07/17  0005 04/06/17 2000 04/06/17  1713   O2PER 21.0 21.0 21.0 21.0       A/P: 61M with advanced NICM (EF 15%), NYHA class III, former tobacco use, untreated SHIRLEY, HTN, direct admission from [place] for advanced therapies workup. [place] nat in place for hemodynamic optimization. CI was around mid 1s, improved to 2 with nipride, in process of converting to oral afterload reduction. Patient has preserved end organ function.    # CV  - diuresed net negative 11L this admission, check hemodyanmics to determine further diuretic needs  - likely d/c swan this morning  - plan for primary prevention ICD likely today  - cont oral afterload reduction with acei and hydralazine/isordil  - cont metoprolol xl as tolerated  - cont satya  - ASA 81 daily  - Digoxin 0.25mg daily  - Simvastatin 20mg daily, changed to atorvastatin 10  - Strict I/O's, daily weights, cardiac diet    # PPx  - SQH    # Dispo  - We are running insurance authorization for LVAD workup and asking the coordinators to begin a show-and-tell, as patient may declare himself to fail medical therapy in the near future. However, given adequate response to afterload reduction and diuresis, improvement in functional status, and preserved  end organ function, we are hoping patient can be discharged home in the next few days with close outpatient follow up, after ICD placement.    Preliminary plan of care. Will discuss with team on rounds.    Primo Lindquist  Cardiology Fellow  [phone]      I have reviewed today's vital signs, notes, medications, labs and imaging.  I have also seen and examined the patient and agree with the findings and plan as outlined above.  Pt without complaints.  VSS with CI 1.9 on oral afterload reducing agents.  Afebrile.  Lungs CTA and nl S1 and S2 with no gallop.  Abd is benign.  Labs with Cr 0.7 and dig 0.8.  Assessment: Pt with advanced heart failure with improvement with oral afterload reducing agents.  As pt just recently discontinued tobacco use will medically manage but introduce to VADs as pt will most likely need MCS in near future.  ICD today.  Total critical care time 30 min.     Bennett Rene MD, PhD  Professor, Heart Failure and Cardiac Transplantation  [facility]

## 2017-04-07 NOTE — PLAN OF CARE
Problem: Cardiac: Heart Failure (Adult)  Goal: Signs and Symptoms of Listed Potential Problems Will be Absent or Manageable (Cardiac: Heart Failure)  Signs and symptoms of listed potential problems will be absent or manageable by discharge/transition of care (reference Cardiac: Heart Failure (Adult) CPG).   Outcome: Improving  D: Neurologically intact. Breathing unlabored on room air. BP 90s/50s. Sinus with bundle branch block, HR 70-80s. Last BM yesterday. Void x1 this   AM. Ambulated with SB assist due to lines, likely can be independent once lines removed.      I/A: [place]-Soy catheter removed, venous introducer to be removed by cath lab. CI 1.8 on 0800 and 1100 MARILYNN. NPO since midnight for procedure, was on on 2L FR and 2G Na diet,m compliant.     P: ICD placement this morning then transfer to 6C post-op. Plan to LVAD workup. Potentially discharge on Monday. Patient requires LVAD education. Monitor ICD site for complications, educate patient on post-op care.

## 2017-04-07 NOTE — OP NOTE
EP PROCEDURE NOTE    Procedures:  1. Single chamber ICD device implantation.  2. Left subclavian venogram.    Attending:Daniel Zavala MD  Procedure Date: 4/7/2017    Pre-operative Diagnosis:  NICM, EF=15%, NYHA class III.  Additional Pacing Indication:   - absent  Post-operative diagnosis:   Successful implantation of ICD device, primary prevention of SCD.  Complications: None.     Fluoroscopy time/dose: 2.9 minutes, 318 cGycm2.      Clinical Profile:  Mr. Chu is a 61 year old male who has a past medical history significant for NIDCM LVEF 15%, mild-moderate MR, pre-DM, former tobacco use, untreated sleep apnea (central and obstructive), HTN, and hepatic congestion. He was admitted after being referred from [place] for advanced heart failure management. He has been undergoing HF optimization here. He has had longstanding heart failure with indication for ICD for multiple years. He wished to pursue ICD during this hospitalization for which he was referred to EP.     PROCEDURE  The risks and benefits of the procedure were explained to the patient in full.  The risks of the procedure include, but are not limited to: pain, bleeding, blood transfusion reactions, infection, pneumothorax, perforation of vessels or heart, pericardial effusion, and death. Informed Consent was obtained. The patient was brought to the EP lab in a fasting and hemodynamically stable condition. The patient was prepped and draped in a sterile fashion.     Sedation: This procedure was performed under moderate sedation under the supervision of the the Staff Physician. The patient received 3 mg Versed and 150 mcg Fentanyl for a total procedural sedation time of 88 minutes. Heart rate, blood pressure, oxygen saturation, and patient responses were monitored throughout the procedure with the assistance of the RN.     The left chest wall was vigorously washed, prepped, and sterilely draped. The chest wall was anesthetized with 2% lidocaine. A subclavian  venogram was performed.    A 5 cm incision was made approximately 2 fingerbreadths from the clavicle. The subcutaneous tissue was carefully dissected down to the pectoral fascia. The dissection was carried inferiorly to form a subcutaneous pocket with adequate hemostasis provided by electrocautery. The subclavian vein was accessed via the pocket and over the first rib under fluoroscopic guidance, and one guidewire was inserted down into the level of the IVC.    A peel away sheath was inserted through the guidewire. The guidewire and dilator was removed. A right ventricular lead was inserted through the sheath down to the RV apical septum and was screwed into the myocardium. There was very good sensing and pacing threshold at this position. Ten volt Pacing was performed without diaphragmatic stimulation. The sheath was then peeled away, and the sleeve adapter was advanced over the lead. The lead was then secured to the muscle using 0-Ethibond suture.     The pocket was then vigorously washed with antibiotic solution. The right ventricular leads were inserted into the pulse generator. The pulse generator and the lead were inserted into the subcutaneous pocket and secured with a 0-Ethibond suture.      The pocket was then closed in 3 layers using 2-0 Vicryl for the deep layers and 4-0 Vicryl for the subcuticular layer. Steri-Strips and a dressing were then applied over the incision site, and the patient was transported to a monitored bed.    EQUIPMENT  1.The Pulse Generator is a  [place] [employer] Dynagen El ICD D150, Serial [id number].   2.The RV Lead is a [employer] 0292, Serial [id number].    MEASUREMENTS  The RV is sensing R waves of 16.8 mV and a pacing capture threshold of 0.9 V @ 0.5 msec with an impedance of 485 ohms.     FINAL PROGRAMMING  Mata Settings:  -Pacing mode: VVI .  -Lower Rate Limit: 40 ppm.  Tachy Settings:  -VT Monitor: set at 170 bpm, Therapy: Monitor   -VT Zone: set at 200 bpm, Therapy: ATP, 41J  shock X6.  -VF zone: set at 240 bpm, Therapy: 41J shock X8.    PLAN  1. Wound care.  2. Antibiotics: keflex x 5 days.    3. CXR and Device interrogation in a.m.    Daniel Zavala MD [facility]  Cardiology - Electrophysiology

## 2017-04-07 NOTE — PROGRESS NOTES
"Team requested VAD Coordinator see patient and family for VAD pump \"show and tell\" this afternoon. Patient currently in cath lab for ICD placement, likely return to unit approx 3 pm. Will check back later in afternoon and assess patient's LOC after procedure.    "

## 2017-04-07 NOTE — PLAN OF CARE
Pt transferred from 4C to 6C after ICD placement in cathlab. Arrived on unit w/drsg over L upper chest. Denies pain. A/Ox3; vss on RA. Sling on L arm. Plan to continue LVAD workup w/head CT and multiple ultrasounds this afternoon. Work-up labs to be drawn in a.m. VAD coord may stop b yet today to speak w/pt and family about heart pump. Continue current meds and therapies.

## 2017-04-07 NOTE — PROGRESS NOTES
PPM implanted in L chest wall.  Settings verified by [employer] rep with Dr. Wang at bedside.  Setting VVI 40. Pressure dressing placed.  Sling applied.

## 2017-04-08 ENCOUNTER — RESULTS ONLY (OUTPATIENT)
Dept: OTHER | Facility: CLINIC | Age: 62
End: 2017-04-08

## 2017-04-08 ENCOUNTER — TRANSFERRED RECORDS (OUTPATIENT)
Dept: HEALTH INFORMATION MANAGEMENT | Facility: CLINIC | Age: 62
End: 2017-04-08

## 2017-04-08 VITALS
WEIGHT: 224.8 LBS | SYSTOLIC BLOOD PRESSURE: 96 MMHG | DIASTOLIC BLOOD PRESSURE: 57 MMHG | HEART RATE: 83 BPM | OXYGEN SATURATION: 94 % | RESPIRATION RATE: 15 BRPM | BODY MASS INDEX: 32.18 KG/M2 | TEMPERATURE: 97.3 F | HEIGHT: 70 IN

## 2017-04-08 LAB
ANION GAP SERPL CALCULATED.3IONS-SCNC: 10 MMOL/L (ref 3–14)
BUN SERPL-MCNC: 12 MG/DL (ref 7–30)
CALCIUM SERPL-MCNC: 8.9 MG/DL (ref 8.5–10.1)
CHLORIDE SERPL-SCNC: 102 MMOL/L (ref 94–109)
CO2 SERPL-SCNC: 20 MMOL/L (ref 20–32)
CREAT SERPL-MCNC: 0.83 MG/DL (ref 0.66–1.25)
CRP SERPL-MCNC: 13 MG/L (ref 0–8)
ERYTHROCYTE [DISTWIDTH] IN BLOOD BY AUTOMATED COUNT: 14.8 % (ref 10–15)
GFR SERPL CREATININE-BSD FRML MDRD: ABNORMAL ML/MIN/1.7M2
GLUCOSE SERPL-MCNC: 113 MG/DL (ref 70–99)
HCT VFR BLD AUTO: 46.8 % (ref 40–53)
HGB BLD-MCNC: 15.3 G/DL (ref 13.3–17.7)
LDH SERPL L TO P-CCNC: 283 U/L (ref 85–227)
MAGNESIUM SERPL-MCNC: 2.2 MG/DL (ref 1.6–2.3)
MCH RBC QN AUTO: 32.8 PG (ref 26.5–33)
MCHC RBC AUTO-ENTMCNC: 32.7 G/DL (ref 31.5–36.5)
MCV RBC AUTO: 100 FL (ref 78–100)
PLATELET # BLD AUTO: 246 10E9/L (ref 150–450)
POTASSIUM SERPL-SCNC: 4.4 MMOL/L (ref 3.4–5.3)
RBC # BLD AUTO: 4.67 10E12/L (ref 4.4–5.9)
SODIUM SERPL-SCNC: 132 MMOL/L (ref 133–144)
URATE SERPL-MCNC: 5.7 MG/DL (ref 3.5–7.2)
VIT B12 SERPL-MCNC: 646 PG/ML (ref 193–986)
WBC # BLD AUTO: 8.9 10E9/L (ref 4–11)

## 2017-04-08 PROCEDURE — 25000128 H RX IP 250 OP 636: Performed by: INTERNAL MEDICINE

## 2017-04-08 PROCEDURE — 86833 HLA CLASS II HIGH DEFIN QUAL: CPT | Performed by: INTERNAL MEDICINE

## 2017-04-08 PROCEDURE — 85730 THROMBOPLASTIN TIME PARTIAL: CPT | Performed by: INTERNAL MEDICINE

## 2017-04-08 PROCEDURE — 82610 CYSTATIN C: CPT | Performed by: INTERNAL MEDICINE

## 2017-04-08 PROCEDURE — 81376 HLA II TYPING 1 LOCUS LR: CPT | Performed by: INTERNAL MEDICINE

## 2017-04-08 PROCEDURE — 25000132 ZZH RX MED GY IP 250 OP 250 PS 637: Performed by: STUDENT IN AN ORGANIZED HEALTH CARE EDUCATION/TRAINING PROGRAM

## 2017-04-08 PROCEDURE — 86147 CARDIOLIPIN ANTIBODY EA IG: CPT | Performed by: INTERNAL MEDICINE

## 2017-04-08 PROCEDURE — 83615 LACTATE (LD) (LDH) ENZYME: CPT | Performed by: INTERNAL MEDICINE

## 2017-04-08 PROCEDURE — 86832 HLA CLASS I HIGH DEFIN QUAL: CPT | Performed by: INTERNAL MEDICINE

## 2017-04-08 PROCEDURE — 99238 HOSP IP/OBS DSCHRG MGMT 30/<: CPT | Mod: 24 | Performed by: INTERNAL MEDICINE

## 2017-04-08 PROCEDURE — 25000132 ZZH RX MED GY IP 250 OP 250 PS 637: Performed by: INTERNAL MEDICINE

## 2017-04-08 PROCEDURE — 36415 COLL VENOUS BLD VENIPUNCTURE: CPT | Performed by: INTERNAL MEDICINE

## 2017-04-08 PROCEDURE — 86140 C-REACTIVE PROTEIN: CPT | Performed by: INTERNAL MEDICINE

## 2017-04-08 PROCEDURE — 81370 HLA I & II TYPING LR: CPT | Performed by: INTERNAL MEDICINE

## 2017-04-08 PROCEDURE — 00000167 ZZHCL STATISTIC INR NC: Performed by: INTERNAL MEDICINE

## 2017-04-08 PROCEDURE — 84550 ASSAY OF BLOOD/URIC ACID: CPT | Performed by: INTERNAL MEDICINE

## 2017-04-08 PROCEDURE — 80048 BASIC METABOLIC PNL TOTAL CA: CPT | Performed by: INTERNAL MEDICINE

## 2017-04-08 PROCEDURE — 25000128 H RX IP 250 OP 636: Performed by: NURSE PRACTITIONER

## 2017-04-08 PROCEDURE — 85027 COMPLETE CBC AUTOMATED: CPT | Performed by: INTERNAL MEDICINE

## 2017-04-08 PROCEDURE — 82607 VITAMIN B-12: CPT | Performed by: INTERNAL MEDICINE

## 2017-04-08 PROCEDURE — 85613 RUSSELL VIPER VENOM DILUTED: CPT | Performed by: INTERNAL MEDICINE

## 2017-04-08 PROCEDURE — 00000401 ZZHCL STATISTIC THROMBIN TIME NC: Performed by: INTERNAL MEDICINE

## 2017-04-08 PROCEDURE — 90732 PPSV23 VACC 2 YRS+ SUBQ/IM: CPT | Performed by: INTERNAL MEDICINE

## 2017-04-08 PROCEDURE — 83735 ASSAY OF MAGNESIUM: CPT | Performed by: INTERNAL MEDICINE

## 2017-04-08 RX ORDER — LANOLIN ALCOHOL/MO/W.PET/CERES
100 CREAM (GRAM) TOPICAL DAILY
Qty: 60 TABLET | Refills: 1 | Status: SHIPPED | OUTPATIENT
Start: 2017-04-08 | End: 2017-05-08

## 2017-04-08 RX ORDER — DIGOXIN 250 MCG
250 TABLET ORAL DAILY
Qty: 30 TABLET | Refills: 1 | Status: SHIPPED | OUTPATIENT
Start: 2017-04-08 | End: 2017-07-23 | Stop reason: DRUGHIGH

## 2017-04-08 RX ORDER — TRAZODONE HYDROCHLORIDE 100 MG/1
100 TABLET ORAL
Qty: 90 TABLET | Refills: 1 | Status: SHIPPED | OUTPATIENT
Start: 2017-04-08 | End: 2018-10-01

## 2017-04-08 RX ORDER — METOPROLOL SUCCINATE 25 MG/1
37.5 TABLET, EXTENDED RELEASE ORAL DAILY
Qty: 60 TABLET | Refills: 3 | Status: SHIPPED | OUTPATIENT
Start: 2017-04-08 | End: 2017-06-15

## 2017-04-08 RX ORDER — HYDRALAZINE HYDROCHLORIDE 100 MG/1
100 TABLET, FILM COATED ORAL 3 TIMES DAILY
Qty: 120 TABLET | Refills: 3 | Status: SHIPPED | OUTPATIENT
Start: 2017-04-08 | End: 2017-06-15

## 2017-04-08 RX ORDER — ISOSORBIDE DINITRATE 40 MG/1
40 TABLET ORAL
Qty: 120 TABLET | Refills: 3 | Status: SHIPPED | OUTPATIENT
Start: 2017-04-08 | End: 2017-06-15 | Stop reason: DRUGHIGH

## 2017-04-08 RX ORDER — ATORVASTATIN CALCIUM 10 MG/1
10 TABLET, FILM COATED ORAL DAILY
Qty: 30 TABLET | Refills: 1 | Status: SHIPPED | OUTPATIENT
Start: 2017-04-08 | End: 2017-06-13

## 2017-04-08 RX ORDER — CEPHALEXIN 500 MG/1
500 CAPSULE ORAL EVERY 6 HOURS
Qty: 20 CAPSULE | Refills: 0 | Status: SHIPPED | OUTPATIENT
Start: 2017-04-08 | End: 2017-04-13

## 2017-04-08 RX ORDER — RAMIPRIL 2.5 MG/1
7.5 CAPSULE ORAL 2 TIMES DAILY
Qty: 90 CAPSULE | Refills: 3 | Status: SHIPPED | OUTPATIENT
Start: 2017-04-08 | End: 2017-05-03

## 2017-04-08 RX ORDER — FUROSEMIDE 40 MG
40 TABLET ORAL DAILY
Qty: 30 TABLET | Refills: 1 | Status: SHIPPED | OUTPATIENT
Start: 2017-04-08 | End: 2017-11-27

## 2017-04-08 RX ORDER — FUROSEMIDE 40 MG
40 TABLET ORAL DAILY
Status: DISCONTINUED | OUTPATIENT
Start: 2017-04-08 | End: 2017-04-08 | Stop reason: HOSPADM

## 2017-04-08 RX ORDER — SPIRONOLACTONE 25 MG/1
25 TABLET ORAL DAILY
Qty: 30 TABLET | Refills: 3 | Status: SHIPPED | OUTPATIENT
Start: 2017-04-08 | End: 2018-04-12

## 2017-04-08 RX ADMIN — RAMIPRIL 7.5 MG: 5 CAPSULE ORAL at 08:53

## 2017-04-08 RX ADMIN — ISOSORBIDE DINITRATE 40 MG: 20 TABLET ORAL at 12:29

## 2017-04-08 RX ADMIN — FUROSEMIDE 40 MG: 40 TABLET ORAL at 12:29

## 2017-04-08 RX ADMIN — CEFAZOLIN SODIUM 2 G: 2 INJECTION, SOLUTION INTRAVENOUS at 12:30

## 2017-04-08 RX ADMIN — ISOSORBIDE DINITRATE 40 MG: 20 TABLET ORAL at 08:52

## 2017-04-08 RX ADMIN — MULTIPLE VITAMINS W/ MINERALS TAB 1 TABLET: TAB at 08:52

## 2017-04-08 RX ADMIN — SPIRONOLACTONE 25 MG: 25 TABLET ORAL at 08:51

## 2017-04-08 RX ADMIN — CEFAZOLIN SODIUM 2 G: 2 INJECTION, SOLUTION INTRAVENOUS at 04:07

## 2017-04-08 RX ADMIN — PNEUMOCOCCAL VACCINE POLYVALENT 0.5 ML
25; 25; 25; 25; 25; 25; 25; 25; 25; 25; 25; 25; 25; 25; 25; 25; 25; 25; 25; 25; 25; 25; 25 INJECTION, SOLUTION INTRAMUSCULAR; SUBCUTANEOUS at 14:24

## 2017-04-08 RX ADMIN — Medication 100 MG: at 08:52

## 2017-04-08 RX ADMIN — METOPROLOL SUCCINATE 37.5 MG: 25 TABLET, EXTENDED RELEASE ORAL at 08:53

## 2017-04-08 RX ADMIN — ASPIRIN 81 MG: 81 TABLET, COATED ORAL at 08:51

## 2017-04-08 RX ADMIN — DIGOXIN 250 MCG: 125 TABLET ORAL at 13:53

## 2017-04-08 RX ADMIN — HYDRALAZINE HYDROCHLORIDE 100 MG: 25 TABLET ORAL at 08:51

## 2017-04-08 RX ADMIN — HYDRALAZINE HYDROCHLORIDE 100 MG: 25 TABLET ORAL at 13:53

## 2017-04-08 NOTE — PLAN OF CARE
Problem: Individualization  Goal: Patient Preferences  D: Pt stable and comfortable. No pain or shortness of breath noted. Compliant with LUE sling. IV Abx as ordered. Wife at bedside overnight.  I: Monitored/assessed pt. Assisted with cares.  A: Pt stable and comfortable.  P: Continue to monitor/assess pt, contact provider with concerns.

## 2017-04-08 NOTE — DISCHARGE SUMMARY
[facility]   Cardiology 2  Discharge Summary    Danielito Chu  [id number]    Date of Admission:  3/29/2017  Date of Discharge:  4/8/2017   Admitting Physician:  Lorena Watkins MD  Discharge Physician:  Bennett Rene MD PHD  Discharging Service:  Cardiology 2.             Follow up Summary:     Follow up with Core clinic in 1 week.    Follow up/establish with Dr. Watkins for advanced heart failure considerations.      Core clinic notified of follow up needs       Primary Care Physician   KEN WALDEN    Discharge Diagnoses      1] Acute on chronic systolic heart failure.     2] Non ischemic cardiomyopathy.      3] Untreated sleep apnea - central and obstructive.      4] Mild to moderate Mitral regurgitation.     5] Congestive hepatopathy.        History of Present Illness     Mr. Danielito Chu is a 62 y/o M with pmh significant for HFrEF (last EF 15% ) 2/2 to unknown etiology (presumed non ischemic) , mild-moderate MR, pre-diabetes, former tobacco use (0.3 packs for 37 years), untreated sleep apnea (central and obstructive), HTN, hepatic congestion 2/2 CHF, who was admitted as a direct admission from [place], [place] for further CHF treatment and workup for advanced therapies.         Consultations This Hospital Stay      1] Electrophysiology.     2] Nutrition.        Hospital Course     Acute on Chronic HFrEF (last EF 15%) 2/2 NICM/Cardiogenic shock:  Mild-moderate MR  HTN  Idiopathic non ischemic cardiomyopathy - ? Viral. TSH elevated - Free T4 is normal. HIV negative. Normal iron panel, Ferritin of 290   Coronary angiogram 4/3:- No ischemic disease.   RHC 4/3: CI 1.6 by Timmy. RA 12, PA 63/35(48), PAW 25, SVR 2000  His hemodynamics were optimized with swan in place with up titration of  Nipride initially but transitioned to PO afterload reducing medications as listed below.    - HD on 4/7 prior to pulling swan and on below therapies: CI 2, SVR 1200, SVo2 65.   - Afterload: Hydralazine 100 mg  "TID, Isordil 20 bubba TID   - Preload: Lasix 40 mg PO daily - adjust diuretics outpt as needed - counseled on 2 liter fluid restriction and daily weights.  Weight down drom 232 to 225.  - AceI: Ramipril increased to 7.5 mg BID (4/5)  - Aldosterone antagonist: Spironolactone 25 mg qday   - BB: Toprol XL 37.5 mg daily. Can up titrate as an outpt as tolerated.  - SCD ppx: - ICD placed by EP on 4/7 - [employer] 0292, Serial [id number].  Checked prior to discharged - functioning appropriately.  Given 5 days of prophylactic Keflex.    - ASA 81 daily  - Digoxin 0.25mg daily (home dose) level within normal limits on hospitalization.  4/7 - 0.8  - Lipitor 10 mg daily  - Strict I/O's, daily weights, cardiac diet  - Establish with Core clinic in 1 week.  Follow up with Dr. Watkins in 2-3 weeks to discuss further advanced therapies.    -  LVAD pre - studies completed:   Head CT, US abdomen, US LUIS ALBERTO, US bilateral lower extremity completed - see below from 4/7  - LVAD labs ordered as requested.     LVAD education was provided - he may need consideration for intotropes or LVAD in the future.       Congestive hepatopathy (now resolved)  Likely 2/2 to above  - Abdominal US negative       Physical Exam   Blood pressure 96/57, pulse 83, temperature 97.3  F (36.3  C), temperature source Oral, resp. rate 15, height 1.765 m (5' 9.5\"), weight 102 kg (224 lb 12.8 oz), SpO2 94 %.    Constitutional: He is oriented to person, place, and time and well-developed, well-nourished, and in no distress. No distress.   HENT:   Head: Normocephalic.   Mouth/Throat: Oropharynx is clear and moist. No oropharyngeal exudate.   Eyes: Conjunctivae and EOM are normal. Pupils are equal, round, and reactive to light. No scleral icterus.   Neck: Normal range of motion. Neck supple.   Cardiovascular: Normal rate and regular rhythm. Exam reveals no gallop and no friction rub.   No murmur heard.  Pulmonary/Chest: Effort normal and breath sounds normal. No " respiratory distress. He has no wheezes.   Abdominal: Soft. Bowel sounds are normal. He exhibits no distension. There is no tenderness.   Musculoskeletal: Normal range of motion. He exhibits no edema.   Neurological: He is alert and oriented to person, place, and time.   Skin: Skin is warm and dry. He is not diaphoretic. No erythema.      Lines/Tubes:  None.    Significant Results and Procedures      RIGHT HEART CATHETERIZATION:          CORONARY ANGIOGRAM:  1. Both coronary arteries arise from their respective cusps normally.  2. Right dominant.  3. Left Main (LM) large caliber vessel with no angiographic evidence of disease.  4. Left Anterior Descending (LAD): is a type 3 vessel which gives rise to septal perforates and four diagonal vessels all without angiographic evidence of disease.  6. Right Coronary Artery (RCA): large caliber vessel which gives rise to a posterior descending artery and a posterolateral branch system all without angiographic evidence of disease.     COMPLICATIONS:  1. None     SUMMARY:   Non ischemic cardiomyopathy  Elevated right and left sided filling pressures  Moderate secondary pulmonary hypertension with a mean PAP of 48 mmHg.  Decreased cardiac output  No angiographic evidence of obstructive coronary artery disease.    US lower extremity bilateral 4/7:         Impression:   1. Right leg: Normal waveforms, no evidence of hemodynamically  significant stenosis.  2. Left leg: Normal waveforms, no evidence of hemodynamically  significant stenosis.    US LUIS ALBERTO: 4/7: IMPRESSION: Normal LUIS ALBERTO on the left, borderline LUIS ALBERTO on the right.    US abdomen: 4/7      Impression:   1. Mild hepatomegaly, coarsened echotexture, and increased  echogenicity of the liver parenchyma, consistent with intrinsic  hepatic disease, such as hepatic steatosis.  2. Kidney sizes are within normal limits bilaterally.  3. No aortic dilatation.    CT head 4/7:   Impression:   1. No acute intracranial abnormality.  2. Diffuse  cerebral atrophy which may be somewhat greater than is  expected for patient's age.          Pending Results   These results will be followed up by Cardiology core clinic/heart failure team  Unresulted Labs Ordered in the Past 30 Days of this Admission     Date and Time Order Name Status Description    4/8/2017 0000 Cardiolipin Trisha IgG and IgM In process     4/8/2017 0000 PRA Single Antigen IgG Antibody In process     4/8/2017 0000 HLA Typing Complete SOT Recipient In process     4/8/2017 0000 Lupus panel In process     4/8/2017 0000 Cystatin C with GFR In process     4/7/2017 2225 Urine Culture Aerobic Bacterial Preliminary           Discharge Medications   Current Discharge Medication List      START taking these medications    Details   aspirin EC 81 MG EC tablet Take 1 tablet (81 mg) by mouth daily  Qty: 30 tablet, Refills: 3    Associated Diagnoses: Acute systolic congestive heart failure (H)      atorvastatin (LIPITOR) 10 MG tablet Take 1 tablet (10 mg) by mouth daily  Qty: 30 tablet, Refills: 1    Associated Diagnoses: Acute systolic congestive heart failure (H)      furosemide (LASIX) 40 MG tablet Take 1 tablet (40 mg) by mouth daily  Qty: 30 tablet, Refills: 1    Associated Diagnoses: Acute systolic congestive heart failure (H)      cephALEXin (KEFLEX) 500 MG capsule Take 1 capsule (500 mg) by mouth every 6 hours for 5 days  Qty: 20 capsule, Refills: 0    Associated Diagnoses: Prophylactic antibiotic      digoxin (LANOXIN) 250 MCG tablet Take 1 tablet (250 mcg) by mouth daily  Qty: 30 tablet, Refills: 1    Associated Diagnoses: Acute on chronic systolic heart failure (H)      hydrALAZINE (APRESOLINE) 100 MG TABS tablet Take 1 tablet (100 mg) by mouth 3 times daily  Qty: 120 tablet, Refills: 3    Associated Diagnoses: Acute on chronic systolic heart failure (H)      isosorbide Dinitrate (ISORDIL) 40 MG TABS Take 1 tablet (40 mg) by mouth 3 times daily (before meals)  Qty: 120 tablet, Refills: 3    Associated  Diagnoses: Acute on chronic systolic heart failure (H)      metoprolol (TOPROL-XL) 25 MG 24 hr tablet Take 1.5 tablets (37.5 mg) by mouth daily  Qty: 60 tablet, Refills: 3    Associated Diagnoses: Acute on chronic systolic heart failure (H)      ramipril (ALTACE) 2.5 MG capsule Take 3 capsules (7.5 mg) by mouth 2 times daily  Qty: 90 capsule, Refills: 3    Associated Diagnoses: Acute on chronic systolic heart failure (H)      spironolactone (ALDACTONE) 25 MG tablet Take 1 tablet (25 mg) by mouth daily  Qty: 30 tablet, Refills: 3    Associated Diagnoses: Acute on chronic systolic heart failure (H)      thiamine 100 MG tablet Take 1 tablet (100 mg) by mouth daily  Qty: 60 tablet, Refills: 1    Associated Diagnoses: Acute on chronic systolic heart failure (H)           Discharge Orders     Discharge Procedure Orders  Reason for your hospital stay   Order Comments: Admitted for acute on chronic systolic heart failure     Adult [facility] Follow-up and recommended labs and tests   Order Comments: Follow up with primary care provider, KEN WALDEN and with the core clinic, within 7 days to evaluate medication change.  The following labs/tests are recommended: BMP, Mg.      Appointments on [place] and/or [facility] (with [facility] or [facility] provider or service). Call [phone] if you haven't heard regarding these appointments within 7 days of discharge.     Activity   Order Comments: Your activity upon discharge: activity as tolerated   Order Specific Question Answer Comments   Is discharge order? Yes      Full Code     Diet   Order Comments: Follow this diet upon discharge: Orders Placed This Encounter     Fluid restriction 2000 ML FLUID     Advance Diet as Tolerated: Regular Diet Adult (2g Na)   Order Specific Question Answer Comments   Is discharge order? Yes         Allergies   No Known Allergies  Data   Most Recent 3 CBC's:  Recent Labs   Lab Test  04/08/17   0715  04/07/17   0355  04/06/17   0508   WBC  8.9   8.4  9.5   HGB  15.3  14.1  15.5   MCV  100  99  99   PLT  246  232  204      Most Recent 3 BMP's:  Recent Labs   Lab Test  04/08/17   0715  04/07/17   1744  04/07/17   0355   NA  132*  136  135   POTASSIUM  4.4  4.4  4.0   CHLORIDE  102  102  103   CO2  20  25  24   BUN  12  13  13   CR  0.83  0.90  0.72   ANIONGAP  10  9  8   ASHLEE  8.9  8.4*  7.9*   GLC  113*  142*  117*     Most Recent 2 LFT's:  Recent Labs   Lab Test  04/06/17   0508  04/05/17   0356   AST  26  29   ALT  40  41   ALKPHOS  92  83   BILITOTAL  0.9  0.9     Most Recent INR's and Anticoagulation Dosing History:  Anticoagulation Dose History     Recent Dosing and Labs Latest Ref Rng & Units 3/29/2017    INR 0.86 - 1.14 1.20(H)        Most Recent 3 Troponin's:No lab results found.  Most Recent Cholesterol Panel:No lab results found.  Most Recent 6 Bacteria Isolates From Any Culture (See EPIC Reports for Culture Details):  Recent Labs   Lab Test  04/07/17   2225   CULT  No growth     Most Recent TSH, T4 and A1c Labs:  Recent Labs   Lab Test  03/30/17   1225   TSH  9.06*   T4  0.84       Discharge Disposition   Discharged to home  Condition at discharge: Stable  Code Status   Full Code    Pt was seen and discussed with Dr. Edgard Oreilly MD   IM PGY2  [phone]    I have reviewed today's vital signs, notes, medications, labs and imaging.  I have also seen and examined the patient and agree with the findings and plan as outlined above.  Pt with endstage heart failure was admitted, diuresed and placed on oral afterload reducing agents with placement of ICD.  As pt has recently d/c tobacco use will d/c to home and have close follow up.  Pt may need LVAD support in near future.  Pt d/c to home and will follow up with Dr. Watkins.     Bennett Rene MD, PhD  Professor, Heart Failure and Cardiac Transplantation  [facility]

## 2017-04-08 NOTE — PROGRESS NOTES
Electrophysiology Progress Note           Assessment and Plan:   61 year old male who has a past medical history significant for NIDCM LVEF 15%, mild-moderate MR, pre-DM, former tobacco use, untreated sleep apnea (central and obstructive), HTN, and hepatic congestion. He was admitted after being referred from [place] for advanced heart failure management. He has been undergoing HF optimization here. He has had longstanding heart failure with indication for ICD for multiple years. He wished to pursue ICD during this hospitalization for which he was referred to EP.     He s/p Single chamber ICD device implantation on 4/7/2017      The patient's device interrogation shows excellent pacing and sensing thresholds    Chest Xray shows good lead placement.    Device site and dressing clean and dry. No hematoma    Device related information and care discussed with the patient    Dscussed with Dr Sally Mondragon MD  Electrophysiology/Cardiology Fellow  April 8, 2017        Interval History:   No pain at device site overnight.           Medications:       furosemide  40 mg Oral Daily     atorvastatin  10 mg Oral Daily at 8 pm     sodium chloride (PF)  3 mL Intracatheter Q8H     ceFAZolin  2 g Intravenous Q8H     cephalexin  500 mg Oral Q6H VITALY     hydrALAZINE  100 mg Oral TID     isosorbide dinitrate  40 mg Oral TID AC     ramipril  7.5 mg Oral BID     vitamin  B-1  100 mg Oral Daily     multivitamin, therapeutic with minerals  1 tablet Oral Daily     spironolactone  25 mg Oral Daily     senna-docusate  1-2 tablet Oral BID     pneumococcal vaccine  0.5 mL Intramuscular Prior to discharge     aspirin EC  81 mg Oral Daily     digoxin  250 mcg Oral Daily     metoprolol  37.5 mg Oral Daily             Physical Exam:   Temp:  [97.1  F (36.2  C)-97.5  F (36.4  C)] 97.1  F (36.2  C)  Pulse:  [83-85] 83  Heart Rate:  [75-89] 82  Resp:  [18] 18  BP: (92-99)/(61-68) 97/68  SpO2:  [98 %-99 %] 98 %    Intake/Output Summary  (Last 24 hours) at 04/08/17 1213  Last data filed at 04/08/17 1000   Gross per 24 hour   Intake              800 ml   Output             1525 ml   Net             -725 ml     Lungs CTAB  S1S2  JVP about 8-10 cm  Warm well perfused, no edema          Data:   ROUTINE IP LABS (Last four results)  BMP  Recent Labs  Lab 04/08/17  0715 04/07/17  1744 04/07/17  0355 04/06/17  1439   * 136 135 133   POTASSIUM 4.4 4.4 4.0 4.1   CHLORIDE 102 102 103 101   ASHLEE 8.9 8.4* 7.9* 8.3*   CO2 20 25 24 25   BUN 12 13 13 11   CR 0.83 0.90 0.72 0.71   * 142* 117* 92     CBC  Recent Labs  Lab 04/08/17  0715 04/07/17  0355 04/06/17  0508 04/05/17  0356   WBC 8.9 8.4 9.5 9.5   RBC 4.67 4.45 4.78 4.54   HGB 15.3 14.1 15.5 14.7   HCT 46.8 44.1 47.4 45.2    99 99 100   MCH 32.8 31.7 32.4 32.4   MCHC 32.7 32.0 32.7 32.5   RDW 14.8 14.8 14.8 14.9    232 204 215     INRNo lab results found in last 7 days.

## 2017-04-08 NOTE — PROGRESS NOTES
"Met with patient, SO and sister Justina to present the HM2, HM3 and [employer] VAD as possible treatment option.     Discussed patient and caregiver responsibilities before and after VAD implant.  Clarified the need for a caregiver to be present for education and to assist patient for at least first 30 days after a patient returns home. Patient's designated caregiver is sister Justina.     Discussed basic overview of VAD equipment, on going management, need for anticoagulation, regular dressing change, grounded three-pronged outlet and functioning telephone. Also discussed frequency of follow-up clinic appointments and the need to stay locally for at least 2-4 weeks.  Reviewed restrictions of having an VAD such as no swimming, bathing, boats, MRI's, or arc welding.     Provided and discussed the VAD educational brochures, information regarding the VAD and transplant support groups, information on [employer] registry, and \"VAD What You Should Know\" consent with additional details. Patient and sister Justina signed consent. VAD coordinator contact information provided.  Encouraged patientJustina to call with questions.    "

## 2017-04-08 NOTE — DISCHARGE INSTRUCTIONS
Home Care after an ICD implant    Wound care:  Keep your incision (surgery wound) dry for 3 days.  After 3 days, you may remove the outer bandage.  Keep the strips of tape on.  They will be removed at your clinic visit.  Check for signs of infection each day.  These include increased redness, swelling, drainage or a fever over 101 F (38.3 C).  Call us immediately if you see any of these signs.  If there are no signs of infection, you may shower in 3 days.  Do not submerge the incision (in a bath tub, hot tub, or swimming pool) until fully healed.  If Dermabond has been applied to your incision.  Do not apply powder or lotion to the incision for 14 days. You may shower in the morning.    Pain:   You may have mild to moderate pain for 3 to 5 days.  Take acetaminophen (Tylenol) or ibuprofen (Advil) for the pain.  Call us if the pain is severe or lasts more than 5 days.    Activity:  You should slowly go back to your normal activities after 24 hours.  Healing will take 4 to 6 weeks.  No driving for 3 days  Avoid climbing a ladder alone.  It is best to stay within 4 feet of the ground.  Avoid anything that may cause rough contact or a hard hit to your chest.  This includes football, hockey, and other contact sports.  Do not go swimming or boating alone.      For at least 4 weeks:  Do not raise your affected arm above your shoulder.  Do not use your affected arm to push, pull, or lift anything over 10 pounds.  Avoid repetitive upper body activities for 6 weeks (ie: golf, swimming, and weight lifting)    Follow Up Visits:  Return to the clinic in 7 to 10 days to have your device and wound checked.  This will be setup with your primary care physician.    Telling others about your device:  Before you have any medical tests or treatments, tell the doctors, dentists, and other care providers about your device.  There are a few tests and treatments that may interfere with your device.  (These include MRI, radiation therapy,  electrocautery, and others.)  Your care team may need to take special steps to keep you safe.  Before you leave the hospital, you will receive a temporary ID card.  A permanent card will be mailed to you about 6 to 8 weeks later.  Always carry the ID card with you.  It has important details about your device.  You should also get a MedicAlert ID.  Please ask us for a MedicAlert brochure, or go to [email or web address].    Safety near electrical equipment:  All of these are safe to use when in good repair:    Microwaves    Radios    Cordless phone    Remote controls    Small electrical tools  Cell phones: Keep cell phones at least 6 inches from your device.  Do not carry the phone in a pocket near your device.  Security costello: It is okay to walk through security costello at the airports and department stores.  Tell airport security that you have a defibrillator.  They should keep the screening wand at least 6 inches from your device.  Full-body scanners are safe.    Avoid the following:    MRI tests in the hospital unless you have a MRI safe defibrillator.    Arc welding, chain saws and high-powered industrial or commercial tools.    Power lines, power plants and large power generators.    Electric body fat scales.    Magnetic mattress pads or pillow.    What to do after a shock from your ICD:  1. Stop what you re doing and rest.  2. If you feel fine before and after the shock, call the device clinic.  3. If you feel unwell or receive more than one shock, call 911 or go to the emergency room.  A shock could mean that your condition has changed and you may need to see a doctor.    Questions?  Please call [facility]    Device Nurse:          Business Hours:  [phone]    After Hours:  [phone]   Choose option 4, then ask for the                                                   on-call device nurse at job code [id number].    Your next device clinic appointment will be setup by our device nurse who will  call you within a week of your discharge.                                   [facility]  [facility] - Clinic [address]  [address] [place] [address]  [id number]

## 2017-04-08 NOTE — PLAN OF CARE
"Problem: Individualization  Goal: Patient Preferences  Outcome: No Change     D/I/A. S/p ICD placement this morning.  Pt is stable.  AVSS.  Pain free.  L arm on a sling.  Pt is up ad moshe and walking out in the halls.  VAD workup in progress.  VAD coordinator to come for VAD pump  \" show and tell\" with pt and cargiver in tomorrow am.    P. Continue to monitor.       "

## 2017-04-08 NOTE — PROGRESS NOTES
D. D/c pt to home.  I. Tele and PIV d/c'd.  HF teaching completed.  ICD site care, s/s infection, ICD shocks and what do were reviewed.  Reviewed all meds and follow up appointments with pt and family member.    A. Pt and family verbalized understanding of all instructions.  No questions or concerns up on d/c to home.  Pt to  Rx on the way home.  Pt d/c'd ~1450 and pt chose to walk down to the [address] independently.

## 2017-04-09 LAB
BACTERIA SPEC CULT: NO GROWTH
Lab: NORMAL
MICRO REPORT STATUS: NORMAL
SPECIMEN SOURCE: NORMAL

## 2017-04-10 ENCOUNTER — CARE COORDINATION (OUTPATIENT)
Dept: CARDIOLOGY | Facility: CLINIC | Age: 62
End: 2017-04-10

## 2017-04-10 PROBLEM — Z95.810 CARDIAC DEFIBRILLATOR IN SITU: Status: ACTIVE | Noted: 2017-04-10

## 2017-04-10 LAB
HLA TYPING COMPLETE SOT RECIPIENT: NORMAL
INTERPRETATION ECG - MUSE: NORMAL
PRA SINGLE ANTIGEN IGG ANTIBODY: NORMAL

## 2017-04-10 NOTE — PROGRESS NOTES
"[facility]  \"Hello, my name is Daly Brenda , and I am calling from the [facility].  I want to check in and see how you are doing, after leaving the hospital.  You may also receive a call from your Care Coordinator (care team), but I want to make sure you don t have any urgent needs.  I have a couple questions to review with you:     Post-Discharge Outreach                                                    Danielito Chu is a 61 year old male     Follow-up Appointments           Adult [facility] Follow-up and recommended labs and tests       Follow up with primary care provider, BASHIR WALDEN and with the core clinic, within 7 days to evaluate medication change. The following labs/tests are recommended: BMP, Mg.                    Care Team:    Patient Care Team       Relationship Specialty Notifications Start End    Bashir Walden PCP - General Family Practice  3/29/17     Phone: [phone] Fax: [phone]         [place] [address]            Transition of Care Review                                                      Did you have a surgery or procedure during your hospital visit? No   If yes, do you have any of the following:     Signs of infection: No    Pain:  No     Pain Scale (0-10) 0/10     Location: Na    Wound/incision concerns? NA    Do you have all of your medications/refills?  Yes    Are you having any side effects or questions about your medication(s)? No    Do you have any new or worsening symptoms?  No    Do you have any future appointments scheduled?   No             Plan                                                      Thanks for your time.  Your Care Coordinator may follow-up within the next couple days.  In the meantime if you have questions, concerns or problems call your care team.        Daly Gutierrez    "

## 2017-04-11 ENCOUNTER — TELEPHONE (OUTPATIENT)
Dept: CARDIOLOGY | Facility: CLINIC | Age: 62
End: 2017-04-11

## 2017-04-11 LAB
LA PPP-IMP: NORMAL
LAB SCANNED RESULT: NORMAL

## 2017-04-13 LAB
CARDIOLIPIN ANTIBODY IGG: NORMAL GPL-U/ML (ref 0–19.9)
CARDIOLIPIN ANTIBODY IGM: 1.9 MPL-U/ML (ref 0–19.9)
SA1 CELL: NORMAL
SA1 COMMENTS: NORMAL
SA1 HI RISK ABY: NORMAL
SA1 MOD RISK ABY: NORMAL
SA1 TEST METHOD: NORMAL
SA2 CELL: NORMAL
SA2 COMMENTS: NORMAL
SA2 HI RISK ABY UA: NORMAL
SA2 MOD RISK ABY: NORMAL
SA2 TEST METHOD: NORMAL

## 2017-04-14 LAB
A* LOCUS: NORMAL
A*: NORMAL
ABTEST METHOD: NORMAL
B* LOCUS: NORMAL
B*: NORMAL
BW-1: NORMAL
C* LOCUS: NORMAL
C*: NORMAL
DPA1*: NORMAL
DPA1*LOCUS: NORMAL
DPB1*: NORMAL
DPB1*LOCUS: NORMAL
DQA1*: NORMAL
DQA1*LOCUS: NORMAL
DQB1* LOCUS: NORMAL
DQB1*: NORMAL
DRB1* LOCUS: NORMAL
DRB1*: NORMAL
DRB3* LOCUS: NORMAL
DRSSO TEST METHOD: NORMAL

## 2017-04-18 ENCOUNTER — DOCUMENTATION ONLY (OUTPATIENT)
Dept: CARDIOLOGY | Facility: CLINIC | Age: 62
End: 2017-04-18

## 2017-04-18 NOTE — PROGRESS NOTES
D:  Pt has completed VAD Show and Tell, Blood work, most of US, and CTs of VAD evaluation.  Insurance does not approve VAD implant at this time.  Dr. Watkins would like to hold off the rest of the VAD eval until pt's condition warrants it.  I:  Will sign off.  P:  Will follow as needed.

## 2017-04-19 DIAGNOSIS — I50.21 ACUTE SYSTOLIC CONGESTIVE HEART FAILURE (H): Primary | ICD-10-CM

## 2017-04-19 DIAGNOSIS — I42.8 NONISCHEMIC CARDIOMYOPATHY (H): ICD-10-CM

## 2017-04-23 ENCOUNTER — PRE VISIT (OUTPATIENT)
Dept: CARDIOLOGY | Facility: CLINIC | Age: 62
End: 2017-04-23

## 2017-04-23 DIAGNOSIS — I50.22 CHRONIC SYSTOLIC HEART FAILURE (H): Primary | ICD-10-CM

## 2017-05-03 ENCOUNTER — OFFICE VISIT (OUTPATIENT)
Dept: CARDIOLOGY | Facility: CLINIC | Age: 62
End: 2017-05-03
Attending: NURSE PRACTITIONER
Payer: COMMERCIAL

## 2017-05-03 VITALS
SYSTOLIC BLOOD PRESSURE: 106 MMHG | OXYGEN SATURATION: 94 % | HEIGHT: 69 IN | WEIGHT: 219.5 LBS | DIASTOLIC BLOOD PRESSURE: 67 MMHG | HEART RATE: 83 BPM | BODY MASS INDEX: 32.51 KG/M2

## 2017-05-03 DIAGNOSIS — I34.0 NON-RHEUMATIC MITRAL REGURGITATION: ICD-10-CM

## 2017-05-03 DIAGNOSIS — I50.22 CHRONIC SYSTOLIC HEART FAILURE (H): Primary | ICD-10-CM

## 2017-05-03 DIAGNOSIS — I50.22 CHRONIC SYSTOLIC HEART FAILURE (H): ICD-10-CM

## 2017-05-03 LAB
ANION GAP SERPL CALCULATED.3IONS-SCNC: 9 MMOL/L (ref 3–14)
BUN SERPL-MCNC: 14 MG/DL (ref 7–30)
CALCIUM SERPL-MCNC: 9.4 MG/DL (ref 8.5–10.1)
CHLORIDE SERPL-SCNC: 103 MMOL/L (ref 94–109)
CO2 SERPL-SCNC: 25 MMOL/L (ref 20–32)
CREAT SERPL-MCNC: 0.88 MG/DL (ref 0.66–1.25)
GFR SERPL CREATININE-BSD FRML MDRD: 88 ML/MIN/1.7M2
GLUCOSE SERPL-MCNC: 102 MG/DL (ref 70–99)
NT-PROBNP SERPL-MCNC: 1252 PG/ML (ref 0–125)
POTASSIUM SERPL-SCNC: 4 MMOL/L (ref 3.4–5.3)
SODIUM SERPL-SCNC: 137 MMOL/L (ref 133–144)

## 2017-05-03 PROCEDURE — 80048 BASIC METABOLIC PNL TOTAL CA: CPT | Performed by: NURSE PRACTITIONER

## 2017-05-03 PROCEDURE — 99212 OFFICE O/P EST SF 10 MIN: CPT

## 2017-05-03 PROCEDURE — 83880 ASSAY OF NATRIURETIC PEPTIDE: CPT | Performed by: NURSE PRACTITIONER

## 2017-05-03 PROCEDURE — 36415 COLL VENOUS BLD VENIPUNCTURE: CPT | Performed by: NURSE PRACTITIONER

## 2017-05-03 PROCEDURE — 99214 OFFICE O/P EST MOD 30 MIN: CPT | Performed by: NURSE PRACTITIONER

## 2017-05-03 RX ORDER — LISINOPRIL 5 MG/1
5 TABLET ORAL 2 TIMES DAILY
Qty: 60 TABLET | Refills: 1 | Status: SHIPPED | OUTPATIENT
Start: 2017-05-03 | End: 2017-06-15 | Stop reason: DRUGHIGH

## 2017-05-03 ASSESSMENT — PAIN SCALES - GENERAL: PAINLEVEL: NO PAIN (0)

## 2017-05-03 NOTE — PATIENT INSTRUCTIONS
You were seen today in the Cardiovascular Clinic at the [facility].   Cardiology Providers you saw during your visit:    Deysi Mattson. ARNP, CNP    Recommendations:   STOP ramipril  START lisinopril    Labs in 1 week (BMP) at [place]    Follow-up:   Keep follow up with Dr. Watkins    For emergencies call [phone].     If you have any questions regarding your visit please contact your care team:        [facility]  Cardiology Care Coordinator-CORE Clinic    Appointment scheduling or nurse questions: [phone]    On Call Cardiologist for after hours or on weekends: [phone]    option #4    If you need a medication refill please contact your pharmacy.  Please allow 3 business days for your refill to be completed.    As always, thank you for trusting us with your health care needs!

## 2017-05-03 NOTE — PROGRESS NOTES
HPI:   Mr. Chu is a 61 year old male with a past medical history including HTN, SHIRLEY, Hyperlipidemia, NICM s/p ICD 4/7/17. She was was admitted to [facility] per [facility] in [place], [place] for acute on chronic decompensated SCHF from 3/29/17-4/8/17. He was initially diagnosed with CM 10/2013 secondary to viral etiology per [facility]. He presents to CORE clinic for initial evaluation.     He presented to [facility] in acute decompensation and underwent aggressive diuresis and reduction of SVR with Nipride with further transition to oral afterload agents via swan guidance. He underwent coronary angiogram 4/3/17 negative for ischemic etiology. RHC numbers 4/3/17 consistent with mRA-12, RV-63/15, PCW-25, mPA-48, MARILYNN CO-3.5, PICK CI-1.6, PVR-6.6, SVR-2000. His hospital course was complicated by congestive hepatopathy, which resolved prior to discharge. LVAD workup was initiated inpatient. Echocardiogram per [facility] 3/22/17 consistent with EF 15%, hypokinesis, akinesis, mild-moderate MR, and normal IVC.     He followed up with his primary Cardiologist in [place], [place] Dr. Margarita Rollins who has reduced his Aldactone and Ramipril secondary to rising Cr.     He denies fever, chills, lightheadedness, dizziness, chest pain, palpitations, SOB, BAJWA, PND, nausea, vomiting, diarrhea, and LE edema. He sleeps at an incline with 3 pillows, but states he is comfortable lying flat if needed. He has tolerated cardiac rehab well at this time without significant BAJWA. His weight continues to trend down at home around 213 lbs with a good baseline weight per him of 212 lbs. He continues to follow a low sodium diet and has been over aggressive with his fluid restriction.       PAST MEDICAL HISTORY:  Past Medical History:   Diagnosis Date     Acute systolic congestive heart failure (H) 4/5/2017     HTN (hypertension)      Mitral regurgitation      Nonischemic cardiomyopathy (H) 4/5/2017     SHIRLEY (obstructive sleep apnea)       Pacemaker 2017    ICD 17       FAMILY HISTORY:  Family History   Problem Relation Age of Onset     Heart Failure Father       at age 86     Heart Failure Paternal Uncle       at 66      Myocardial Infarction Paternal Uncle       at age 62     Coronary Artery Disease Mother       during CABG at age 41     SOCIAL HISTORY:  Social History     Social History     Marital status: Single       CURRENT MEDICATIONS:  Outpatient Medications Prior to Visit   Medication Sig Dispense Refill     aspirin EC 81 MG EC tablet Take 1 tablet (81 mg) by mouth daily 30 tablet 3     atorvastatin (LIPITOR) 10 MG tablet Take 1 tablet (10 mg) by mouth daily 30 tablet 1     furosemide (LASIX) 40 MG tablet Take 1 tablet (40 mg) by mouth daily 30 tablet 1     digoxin (LANOXIN) 250 MCG tablet Take 1 tablet (250 mcg) by mouth daily 30 tablet 1     hydrALAZINE (APRESOLINE) 100 MG TABS tablet Take 1 tablet (100 mg) by mouth 3 times daily 120 tablet 3     isosorbide Dinitrate (ISORDIL) 40 MG TABS Take 1 tablet (40 mg) by mouth 3 times daily (before meals) 120 tablet 3     metoprolol (TOPROL-XL) 25 MG 24 hr tablet Take 1.5 tablets (37.5 mg) by mouth daily 60 tablet 3     ramipril (ALTACE) 2.5 MG capsule Take 3 capsules (7.5 mg) by mouth 2 times daily 90 capsule 3     spironolactone (ALDACTONE) 25 MG tablet Take 1 tablet (25 mg) by mouth daily 30 tablet 3     thiamine 100 MG tablet Take 1 tablet (100 mg) by mouth daily 60 tablet 1     traZODone (DESYREL) 100 MG tablet Take 1 tablet (100 mg) by mouth nightly as needed for sleep 90 tablet 1     No facility-administered medications prior to visit.        ROS:   CONSTITUTIONAL: Denies fever, chills, fatigue, or weight fluctuations.   HEENT: Denies, vision changes, and changes in speech. He complains of headaches.   CV: Refer to HPI.   PULMONARY:Denies shortness of breath, cough, or previous TB exposure.   GI:Denies nausea, vomiting, diarrhea, and abdominal pain. Bowel  "movements are regular.   :Denies urinary alterations, dysuria, urinary frequency, hematuria, and abnormal drainage.   EXT:Denies lower extremity edema.   SKIN:Denies abnormal rashes or lesions.   MUSCULOSKELETAL:Denies upper or lower extremity weakness and pain.   NEUROLOGIC:Denies lightheadedness, dizziness, seizures, or upper or lower extremity paresthesia.     EXAM:  /67 (BP Location: Left arm, Patient Position: Chair, Cuff Size: Adult Large)  Pulse 83  Ht 1.753 m (5' 9\")  Wt 99.6 kg (219 lb 8 oz)  SpO2 94%  BMI 32.41 kg/m2  GENERAL: Appears alert and oriented times three.   HEENT: Eye symmetrical and free of discharge bilaterally. Mucous membranes moist and without lesions.  NECK: Supple and without lymphadenopathy. JVD 8 cm.   CV: RRR, S1S2 present with III/VI murmur heard best at LSB.   RESPIRATORY: Respirations regular, even, and unlabored. Lungs CTA throughout.   GI: Soft and non distended with normoactive bowel sounds present in all quadrants. No tenderness, rebound, guarding. No organomegaly.   EXTREMITIES: No peripheral edema. 2+ bilateral pedal pulses.   NEUROLOGIC: Alert and orientated x 3. CN II-XII grossly intact. No focal deficits.   MUSCULOSKELETAL: No joint swelling or tenderness.   SKIN: No jaundice. No rashes or lesions.     Labs:  CBC RESULTS:  Lab Results   Component Value Date    WBC 8.9 04/08/2017    RBC 4.67 04/08/2017    HGB 15.3 04/08/2017    HCT 46.8 04/08/2017     04/08/2017    MCH 32.8 04/08/2017    MCHC 32.7 04/08/2017    RDW 14.8 04/08/2017     04/08/2017       CMP RESULTS:  Lab Results   Component Value Date     05/03/2017    POTASSIUM 4.0 05/03/2017    CHLORIDE 103 05/03/2017    CO2 25 05/03/2017    ANIONGAP 9 05/03/2017     (H) 05/03/2017    BUN 14 05/03/2017    CR 0.88 05/03/2017    GFRESTIMATED 88 05/03/2017    GFRESTBLACK >90   GFR Calc   05/03/2017    ASHLEE 9.4 05/03/2017    BILITOTAL 0.9 04/06/2017    ALBUMIN 3.0 (L) " 04/06/2017    ALKPHOS 92 04/06/2017    ALT 40 04/06/2017    AST 26 04/06/2017        INR RESULTS:  Lab Results   Component Value Date    INR 1.20 (H) 03/29/2017       Lab Results   Component Value Date    MAG 2.2 04/08/2017     Lab Results   Component Value Date    NTBNPI 2597 (H) 03/29/2017     Lab Results   Component Value Date    NTBNP 1252 (H) 05/03/2017       Assessment and Plan:   Mr. Chu is a 61 year old male with a past medical history including HTN, SHIRLEY, Hyperlipidemia, NICM s/p ICD 4/7/17. He presents to CORE clinic for initial evaluation following hospitalization for acute decompensation of SCHF.     Chronic systolic heart failure secondary to unknown etiology, likely viral.   Stage D  NYHA Class III  ACEi/ARB: D/C Ramipril. Lisinopril 5 mg po BID. Repeat BMP in 1 week. If Cr and BP remain stable plan to up titrate Lisinopril and decrease Isordil.   BB Toprol XL 37.5 mg po daily.   Aldosterone antagonist Aldactone 25 mg po daily   SCD prophylaxis ICD  Fluid status: Euvolemic. Continue Lasix at 40 mg po daily   Sleep Apnea Evaluation: SHIRLEY on CPAP.   - CPX prior to appointment with Dr. Salter   - Would recommend up titration of Lisinopril with reduction in Isordil as BP requires given current symptoms of headache. Defer uptitration in BB today given soft BP and recent decompensation. Goal to maximize ACE as first priority.     Moderate Mitral Regurgitation.   - Recommend repeat Echocardiogram to further assess MR.     Hyperlipidemia.   - Lipitor.     HTN.   - Continue Toprol XL and Lisinopril.     Follow up in 1 month with CPX and Echocardiogram prior to appointment. Repeat BMP and BP check in 1 week.       Deysi Mattson  5/3/2017          CC  DRISS SALTER

## 2017-05-03 NOTE — MR AVS SNAPSHOT
After Visit Summary   5/3/2017    Danielito Chu    MRN: [id number]           Patient Information     Date Of Birth          1955        Visit Information        Provider Department      5/3/2017 2:00 PM Deysi Mattson APRN CNP [facility]        Today's Diagnoses     Chronic systolic heart failure (H)    -  1      Care Instructions    You were seen today in the Cardiovascular Clinic at the [facility].   Cardiology Providers you saw during your visit:    Deysi Mattson. ARNP, CNP    Recommendations:   STOP ramipril  START lisinopril    Labs in 1 week (BMP) at [place]    Follow-up:   Keep follow up with Dr. Watkins    For emergencies call 911.     If you have any questions regarding your visit please contact your care team:        [facility]  Cardiology Care Coordinator-CORE Clinic    Appointment scheduling or nurse questions: [phone]    On Call Cardiologist for after hours or on weekends: [phone]    option #4    If you need a medication refill please contact your pharmacy.  Please allow 3 business days for your refill to be completed.    As always, thank you for trusting us with your health care needs!        Follow-ups after your visit        Your next 10 appointments already scheduled     Héctor 15, 2017 10:30 AM CDT   Card Cardpul Stress Tst Adult with [facility]   [facility] ([facility]    [address] [facility] [address]               Héctor 15, 2017  2:00 PM CDT   (Arrive by 1:45 PM)   NEW HEART FAILURE with Lorena Watkins MD   [facility] ([facility])    [address] [id number] [phone]              Future tests that were ordered for you today     Open Future Orders        Priority Expected Expires Ordered    Basic metabolic panel Routine 5/10/2017 5/3/2018 5/3/2017    Digoxin level Routine  5/3/2018  "5/3/2017            Who to contact     If you have questions or need follow up information about today's clinic visit or your schedule please contact [facility] directly at [phone].  Normal or non-critical lab and imaging results will be communicated to you by MyChart, letter or phone within 4 business days after the clinic has received the results. If you do not hear from us within 7 days, please contact the clinic through MyChart or phone. If you have a critical or abnormal lab result, we will notify you by phone as soon as possible.  Submit refill requests through [employer] or call your pharmacy and they will forward the refill request to us. Please allow 3 business days for your refill to be completed.          Additional Information About Your Visit        [facility] Information     [employer] lets you send messages to your doctor, view your test results, renew your prescriptions, schedule appointments and more. To sign up, go to [email or web address] . Click on \"Log in\" on the left side of the screen, which will take you to the Welcome page. Then click on \"Sign up Now\" on the right side of the page.     You will be asked to enter the access code listed below, as well as some personal information. Please follow the directions to create your username and password.     Your access code is: [email or web address]  Expires: 2017 11:43 AM     Your access code will  in 90 days. If you need help or a new code, please call your [place] clinic or [phone].        Care EveryWhere ID     This is your Care EveryWhere ID. This could be used by other organizations to access your [place] medical records  [id number]        Your Vitals Were     Pulse Height Pulse Oximetry BMI (Body Mass Index)          83 1.753 m (5' 9\") 94% 32.41 kg/m2         Blood Pressure from Last 3 Encounters:   17 106/67   17 96/57    Weight from Last 3 Encounters:   17 99.6 kg (219 lb 8 oz)   17 102 kg (224 lb " 12.8 oz)                 Today's Medication Changes          These changes are accurate as of: 5/3/17  2:57 PM.  If you have any questions, ask your nurse or doctor.               Start taking these medicines.        Dose/Directions    lisinopril 5 MG tablet   Commonly known as:  PRINIVIL/ZESTRIL   Used for:  Chronic systolic heart failure (H)   Started by:  Deysi Mattson APRN CNP        Dose:  5 mg   Take 1 tablet (5 mg) by mouth 2 times daily   Quantity:  60 tablet   Refills:  1         Stop taking these medicines if you haven't already. Please contact your care team if you have questions.     ramipril 2.5 MG capsule   Commonly known as:  ALTACE   Stopped by:  Deysi Mattson APRN CNP                Where to get your medicines      These medications were sent to [facility]/pharmacy #[id number] - [facility] [address] [address]     Phone:  [phone]     lisinopril 5 MG tablet                Primary Care Provider Office Phone # Fax #    Bashir Hines [phone]       [address] [address]        Thank you!     Thank you for choosing [facility]  for your care. Our goal is always to provide you with excellent care. Hearing back from our patients is one way we can continue to improve our services. Please take a few minutes to complete the written survey that you may receive in the mail after your visit with us. Thank you!             Your Updated Medication List - Protect others around you: Learn how to safely use, store and throw away your medicines at www.disposemymeds.org.          This list is accurate as of: 5/3/17  2:57 PM.  Always use your most recent med list.                   Brand Name Dispense Instructions for use    aspirin 81 MG EC tablet     30 tablet    Take 1 tablet (81 mg) by mouth daily       atorvastatin 10 MG tablet    LIPITOR    30 tablet    Take 1 tablet (10 mg) by mouth daily        digoxin 250 MCG tablet    LANOXIN    30 tablet    Take 1 tablet (250 mcg) by mouth daily       furosemide 40 MG tablet    LASIX    30 tablet    Take 1 tablet (40 mg) by mouth daily       hydrALAZINE 100 MG Tabs tablet    APRESOLINE    120 tablet    Take 1 tablet (100 mg) by mouth 3 times daily       isosorbide Dinitrate 40 MG Tabs    ISORDIL    120 tablet    Take 1 tablet (40 mg) by mouth 3 times daily (before meals)       lisinopril 5 MG tablet    PRINIVIL/ZESTRIL    60 tablet    Take 1 tablet (5 mg) by mouth 2 times daily       metoprolol 25 MG 24 hr tablet    TOPROL-XL    60 tablet    Take 1.5 tablets (37.5 mg) by mouth daily       spironolactone 25 MG tablet    ALDACTONE    30 tablet    Take 1 tablet (25 mg) by mouth daily       thiamine 100 MG tablet     60 tablet    Take 1 tablet (100 mg) by mouth daily       traZODone 100 MG tablet    DESYREL    90 tablet    Take 1 tablet (100 mg) by mouth nightly as needed for sleep

## 2017-05-03 NOTE — LETTER
5/3/2017      RE: Danielito Chu  [address] [facility] [address]       Dear Colleague,    Thank you for the opportunity to participate in the care of your patient, Danielito Chu, at the [facility] at [facility]. Please see a copy of my visit note below.    HPI:   Mr. Chu is a 61 year old male with a past medical history including HTN, SHIRLEY, Hyperlipidemia, NICM s/p ICD 4/7/17. She was was admitted to [facility] per [facility] in [place], [place] for acute on chronic decompensated SCHF from 3/29/17-4/8/17. He was initially diagnosed with CM 10/2013 secondary to viral etiology per [facility] Cardiology. He presents to CORE clinic for initial evaluation.     He presented to [facility] in acute decompensation and underwent aggressive diuresis and reduction of SVR with Nipride with further transition to oral afterload agents via swan guidance. He underwent coronary angiogram 4/3/17 negative for ischemic etiology. RHC numbers 4/3/17 consistent with mRA-12, RV-63/15, PCW-25, mPA-48, MARILYNN CO-3.5, PICK CI-1.6, PVR-6.6, SVR-2000. His hospital course was complicated by congestive hepatopathy, which resolved prior to discharge. LVAD workup was initiated inpatient. Echocardiogram per [facility] 3/22/17 consistent with EF 15%, hypokinesis, akinesis, mild-moderate MR, and normal IVC.     He followed up with his primary Cardiologist in [place], [place] Dr. Margarita Rollins who has reduced his Aldactone and Ramipril secondary to rising Cr.     He denies fever, chills, lightheadedness, dizziness, chest pain, palpitations, SOB, BAJWA, PND, nausea, vomiting, diarrhea, and LE edema. He sleeps at an incline with 3 pillows, but states he is comfortable lying flat if needed. He has tolerated cardiac rehab well at this time without significant BAJWA. His weight continues to trend down at home around 213 lbs with a good baseline weight per him of 212 lbs. He continues to follow a low sodium diet and  has been over aggressive with his fluid restriction.       PAST MEDICAL HISTORY:  Past Medical History:   Diagnosis Date     Acute systolic congestive heart failure (H) 2017     HTN (hypertension)      Mitral regurgitation      Nonischemic cardiomyopathy (H) 2017     SHIRLEY (obstructive sleep apnea)      Pacemaker 2017    ICD 17       FAMILY HISTORY:  Family History   Problem Relation Age of Onset     Heart Failure Father       at age 86     Heart Failure Paternal Uncle       at 66      Myocardial Infarction Paternal Uncle       at age 62     Coronary Artery Disease Mother       during CABG at age 41     SOCIAL HISTORY:  Social History     Social History     Marital status: Single       CURRENT MEDICATIONS:  Outpatient Medications Prior to Visit   Medication Sig Dispense Refill     aspirin EC 81 MG EC tablet Take 1 tablet (81 mg) by mouth daily 30 tablet 3     atorvastatin (LIPITOR) 10 MG tablet Take 1 tablet (10 mg) by mouth daily 30 tablet 1     furosemide (LASIX) 40 MG tablet Take 1 tablet (40 mg) by mouth daily 30 tablet 1     digoxin (LANOXIN) 250 MCG tablet Take 1 tablet (250 mcg) by mouth daily 30 tablet 1     hydrALAZINE (APRESOLINE) 100 MG TABS tablet Take 1 tablet (100 mg) by mouth 3 times daily 120 tablet 3     isosorbide Dinitrate (ISORDIL) 40 MG TABS Take 1 tablet (40 mg) by mouth 3 times daily (before meals) 120 tablet 3     metoprolol (TOPROL-XL) 25 MG 24 hr tablet Take 1.5 tablets (37.5 mg) by mouth daily 60 tablet 3     ramipril (ALTACE) 2.5 MG capsule Take 3 capsules (7.5 mg) by mouth 2 times daily 90 capsule 3     spironolactone (ALDACTONE) 25 MG tablet Take 1 tablet (25 mg) by mouth daily 30 tablet 3     thiamine 100 MG tablet Take 1 tablet (100 mg) by mouth daily 60 tablet 1     traZODone (DESYREL) 100 MG tablet Take 1 tablet (100 mg) by mouth nightly as needed for sleep 90 tablet 1     No facility-administered medications prior to visit.        ROS:  "  CONSTITUTIONAL: Denies fever, chills, fatigue, or weight fluctuations.   HEENT: Denies, vision changes, and changes in speech. He complains of headaches.   CV: Refer to HPI.   PULMONARY:Denies shortness of breath, cough, or previous TB exposure.   GI:Denies nausea, vomiting, diarrhea, and abdominal pain. Bowel movements are regular.   :Denies urinary alterations, dysuria, urinary frequency, hematuria, and abnormal drainage.   EXT:Denies lower extremity edema.   SKIN:Denies abnormal rashes or lesions.   MUSCULOSKELETAL:Denies upper or lower extremity weakness and pain.   NEUROLOGIC:Denies lightheadedness, dizziness, seizures, or upper or lower extremity paresthesia.     EXAM:  /67 (BP Location: Left arm, Patient Position: Chair, Cuff Size: Adult Large)  Pulse 83  Ht 1.753 m (5' 9\")  Wt 99.6 kg (219 lb 8 oz)  SpO2 94%  BMI 32.41 kg/m2  GENERAL: Appears alert and oriented times three.   HEENT: Eye symmetrical and free of discharge bilaterally. Mucous membranes moist and without lesions.  NECK: Supple and without lymphadenopathy. JVD 8 cm.   CV: RRR, S1S2 present with III/VI murmur heard best at LSB.   RESPIRATORY: Respirations regular, even, and unlabored. Lungs CTA throughout.   GI: Soft and non distended with normoactive bowel sounds present in all quadrants. No tenderness, rebound, guarding. No organomegaly.   EXTREMITIES: No peripheral edema. 2+ bilateral pedal pulses.   NEUROLOGIC: Alert and orientated x 3. CN II-XII grossly intact. No focal deficits.   MUSCULOSKELETAL: No joint swelling or tenderness.   SKIN: No jaundice. No rashes or lesions.     Labs:  CBC RESULTS:  Lab Results   Component Value Date    WBC 8.9 04/08/2017    RBC 4.67 04/08/2017    HGB 15.3 04/08/2017    HCT 46.8 04/08/2017     04/08/2017    MCH 32.8 04/08/2017    MCHC 32.7 04/08/2017    RDW 14.8 04/08/2017     04/08/2017       CMP RESULTS:  Lab Results   Component Value Date     05/03/2017    POTASSIUM 4.0 " 05/03/2017    CHLORIDE 103 05/03/2017    CO2 25 05/03/2017    ANIONGAP 9 05/03/2017     (H) 05/03/2017    BUN 14 05/03/2017    CR 0.88 05/03/2017    GFRESTIMATED 88 05/03/2017    GFRESTBLACK >90   GFR Calc   05/03/2017    ASHLEE 9.4 05/03/2017    BILITOTAL 0.9 04/06/2017    ALBUMIN 3.0 (L) 04/06/2017    ALKPHOS 92 04/06/2017    ALT 40 04/06/2017    AST 26 04/06/2017        INR RESULTS:  Lab Results   Component Value Date    INR 1.20 (H) 03/29/2017       Lab Results   Component Value Date    MAG 2.2 04/08/2017     Lab Results   Component Value Date    NTBNPI 2597 (H) 03/29/2017     Lab Results   Component Value Date    NTBNP 1252 (H) 05/03/2017       Assessment and Plan:   Mr. Chu is a 61 year old male with a past medical history including HTN, SHIRLEY, Hyperlipidemia, NICM s/p ICD 4/7/17. He presents to CORE clinic for initial evaluation following hospitalization for acute decompensation of SCHF.     Chronic systolic heart failure secondary to unknown etiology, likely viral.   Stage D  NYHA Class III  ACEi/ARB: D/C Ramipril. Lisinopril 5 mg po BID. Repeat BMP in 1 week. If Cr and BP remain stable plan to up titrate Lisinopril and decrease Isordil.   BB Toprol XL 37.5 mg po daily.   Aldosterone antagonist Aldactone 25 mg po daily   SCD prophylaxis ICD  Fluid status: Euvolemic. Continue Lasix at 40 mg po daily   Sleep Apnea Evaluation: SHIRLEY on CPAP.   - CPX prior to appointment with Dr. Watkins   - Would recommend up titration of Lisinopril with reduction in Isordil as BP requires given current symptoms of headache. Defer uptitration in BB today given soft BP and recent decompensation. Goal to maximize ACE as first priority.     Moderate Mitral Regurgitation.   - Recommend repeat Echocardiogram to further assess MR.     Hyperlipidemia.   - Lipitor.     HTN.   - Continue Toprol XL and Lisinopril.     Follow up in 1 month with CPX and Echocardiogram prior to appointment. Repeat BMP and BP check in 1  week.       Deysi Mattson  5/3/2017    CC  DRISS SALTER

## 2017-05-03 NOTE — NURSING NOTE
Chief Complaint   Patient presents with     New Patient     New CORE appt; 61yr old male with a h/o chronic systolic HF completing VAD and transplant work-ups presenting for follow-up with labs prior.     JOSE M Florence

## 2017-06-13 ENCOUNTER — PRE VISIT (OUTPATIENT)
Dept: CARDIOLOGY | Facility: CLINIC | Age: 62
End: 2017-06-13

## 2017-06-13 DIAGNOSIS — I50.21 ACUTE SYSTOLIC CONGESTIVE HEART FAILURE (H): ICD-10-CM

## 2017-06-13 RX ORDER — ATORVASTATIN CALCIUM 10 MG/1
10 TABLET, FILM COATED ORAL DAILY
Qty: 30 TABLET | Refills: 1 | Status: CANCELLED | OUTPATIENT
Start: 2017-06-13

## 2017-06-13 RX ORDER — ATORVASTATIN CALCIUM 10 MG/1
TABLET, FILM COATED ORAL
Qty: 90 TABLET | Refills: 3 | Status: SHIPPED | OUTPATIENT
Start: 2017-06-13 | End: 2018-06-20

## 2017-06-15 ENCOUNTER — HOSPITAL ENCOUNTER (OUTPATIENT)
Dept: CARDIOLOGY | Facility: CLINIC | Age: 62
Discharge: HOME | End: 2017-06-15
Attending: NURSE PRACTITIONER | Admitting: NURSE PRACTITIONER
Payer: COMMERCIAL

## 2017-06-15 ENCOUNTER — ALLIED HEALTH/NURSE VISIT (OUTPATIENT)
Dept: CARDIOLOGY | Facility: CLINIC | Age: 62
End: 2017-06-15
Attending: INTERNAL MEDICINE
Payer: COMMERCIAL

## 2017-06-15 ENCOUNTER — HOSPITAL ENCOUNTER (OUTPATIENT)
Dept: CARDIOLOGY | Facility: CLINIC | Age: 62
Discharge: HOME | End: 2017-06-15
Attending: INTERNAL MEDICINE | Admitting: INTERNAL MEDICINE
Payer: COMMERCIAL

## 2017-06-15 VITALS
OXYGEN SATURATION: 94 % | BODY MASS INDEX: 33.98 KG/M2 | DIASTOLIC BLOOD PRESSURE: 59 MMHG | WEIGHT: 229.4 LBS | SYSTOLIC BLOOD PRESSURE: 96 MMHG | HEART RATE: 73 BPM | HEIGHT: 69 IN

## 2017-06-15 DIAGNOSIS — I50.23 ACUTE ON CHRONIC SYSTOLIC HEART FAILURE (H): ICD-10-CM

## 2017-06-15 DIAGNOSIS — I50.22 CHRONIC SYSTOLIC HEART FAILURE (H): ICD-10-CM

## 2017-06-15 DIAGNOSIS — I34.0 NON-RHEUMATIC MITRAL REGURGITATION: ICD-10-CM

## 2017-06-15 DIAGNOSIS — I42.8 NONISCHEMIC CARDIOMYOPATHY (H): ICD-10-CM

## 2017-06-15 DIAGNOSIS — I42.8 NONISCHEMIC CARDIOMYOPATHY (H): Primary | ICD-10-CM

## 2017-06-15 DIAGNOSIS — I50.21 ACUTE SYSTOLIC CONGESTIVE HEART FAILURE (H): ICD-10-CM

## 2017-06-15 LAB
ANION GAP SERPL CALCULATED.3IONS-SCNC: 9 MMOL/L (ref 3–14)
BUN SERPL-MCNC: 17 MG/DL (ref 7–30)
CALCIUM SERPL-MCNC: 8.8 MG/DL (ref 8.5–10.1)
CHLORIDE SERPL-SCNC: 102 MMOL/L (ref 94–109)
CO2 SERPL-SCNC: 22 MMOL/L (ref 20–32)
CREAT SERPL-MCNC: 0.9 MG/DL (ref 0.66–1.25)
DIGOXIN SERPL-MCNC: 0.7 UG/L (ref 0.5–2)
GFR SERPL CREATININE-BSD FRML MDRD: 86 ML/MIN/1.7M2
GLUCOSE SERPL-MCNC: 133 MG/DL (ref 70–99)
POTASSIUM SERPL-SCNC: 3.9 MMOL/L (ref 3.4–5.3)
SODIUM SERPL-SCNC: 133 MMOL/L (ref 133–144)

## 2017-06-15 PROCEDURE — 80048 BASIC METABOLIC PNL TOTAL CA: CPT | Performed by: NURSE PRACTITIONER

## 2017-06-15 PROCEDURE — 94621 CARDIOPULM EXERCISE TESTING: CPT | Mod: 26 | Performed by: INTERNAL MEDICINE

## 2017-06-15 PROCEDURE — 93289 INTERROG DEVICE EVAL HEART: CPT | Mod: 26 | Performed by: INTERNAL MEDICINE

## 2017-06-15 PROCEDURE — 93306 TTE W/DOPPLER COMPLETE: CPT | Mod: 26 | Performed by: INTERNAL MEDICINE

## 2017-06-15 PROCEDURE — 80162 ASSAY OF DIGOXIN TOTAL: CPT | Performed by: NURSE PRACTITIONER

## 2017-06-15 PROCEDURE — 99204 OFFICE O/P NEW MOD 45 MIN: CPT | Mod: 24 | Performed by: INTERNAL MEDICINE

## 2017-06-15 PROCEDURE — 40000264 ECHO COMPLETE WITH OPTISON

## 2017-06-15 PROCEDURE — 94621 CARDIOPULM EXERCISE TESTING: CPT

## 2017-06-15 PROCEDURE — 99212 OFFICE O/P EST SF 10 MIN: CPT | Mod: 25,ZF

## 2017-06-15 PROCEDURE — 25500064 ZZH RX 255 OP 636: Performed by: INTERNAL MEDICINE

## 2017-06-15 PROCEDURE — 36415 COLL VENOUS BLD VENIPUNCTURE: CPT | Performed by: NURSE PRACTITIONER

## 2017-06-15 PROCEDURE — 93282 PRGRMG EVAL IMPLANTABLE DFB: CPT | Mod: ZF

## 2017-06-15 RX ORDER — METOPROLOL SUCCINATE 25 MG/1
50 TABLET, EXTENDED RELEASE ORAL DAILY
Qty: 60 TABLET | Refills: 3 | Status: SHIPPED | OUTPATIENT
Start: 2017-06-15 | End: 2017-06-15

## 2017-06-15 RX ORDER — HYDRALAZINE HYDROCHLORIDE 100 MG/1
50 TABLET, FILM COATED ORAL 3 TIMES DAILY
Qty: 120 TABLET | Refills: 3 | COMMUNITY
Start: 2017-06-15 | End: 2017-10-06

## 2017-06-15 RX ORDER — ISOSORBIDE DINITRATE 20 MG/1
20 TABLET ORAL 3 TIMES DAILY
Qty: 270 TABLET | Refills: 3 | Status: SHIPPED | OUTPATIENT
Start: 2017-06-15 | End: 2017-10-19

## 2017-06-15 RX ORDER — LISINOPRIL 10 MG/1
10 TABLET ORAL DAILY
Qty: 180 TABLET | Refills: 3 | Status: SHIPPED | OUTPATIENT
Start: 2017-06-15 | End: 2017-10-19 | Stop reason: ALTCHOICE

## 2017-06-15 RX ORDER — METOPROLOL SUCCINATE 25 MG/1
50 TABLET, EXTENDED RELEASE ORAL DAILY
Qty: 60 TABLET | Refills: 3 | COMMUNITY
Start: 2017-06-15 | End: 2017-11-17

## 2017-06-15 RX ADMIN — HUMAN ALBUMIN MICROSPHERES AND PERFLUTREN 6 ML: 10; .22 INJECTION, SOLUTION INTRAVENOUS at 10:30

## 2017-06-15 ASSESSMENT — PAIN SCALES - GENERAL: PAINLEVEL: NO PAIN (0)

## 2017-06-15 NOTE — MR AVS SNAPSHOT
After Visit Summary   6/15/2017    Danielito Chu    MRN: [id number]           Patient Information     Date Of Birth          1955        Visit Information        Provider Department      6/15/2017 2:00 PM Lorena Watkins MD [facility]        Today's Diagnoses     Chronic systolic heart failure (H)        Acute on chronic systolic heart failure (H)          Care Instructions    Decrease Hydralazine 50 mg three times daily  Decrease Isosorbide 20 mg three times daily/ new pills orded    Increase Lisinopril 5 mg in AM, 10 mg in evening, in 1 week increase 10 mg twice daily. I have ordered you 10 mg tablets.    Increase Metoprolol 50 mg daily,   2 weeks, after increasing Lisinopril,     Follow up with Dr. Watkins in Oct.    Don't get a flu shot until after we see you in Oct.    Joana Trevino, RN  Cardiology Care Coordinator  Please be aware that I work part-time but I will be checking messages several times per week.   For urgent needs, please call the number below.    [phone], press 1 for [employer], press 3 to speak to a nurse    .                Follow-ups after your visit        Follow-up notes from your care team     Return in about 4 months (around 10/15/2017) for Physical Exam, Lab Work.      Your next 10 appointments already scheduled     Oct 19, 2017  2:30 PM CDT   Lab with UC LAB   [facility])    [address] [place] [id number] [phone]            Oct 19, 2017  3:00 PM CDT   (Arrive by 2:45 PM)   Implanted Defibulator with Uc Cv Device 1   [facility])    [address] [place] [id number] [phone]            Oct 19, 2017  3:30 PM CDT   (Arrive by 3:15 PM)   RETURN HEART FAILURE with Lorena Watkins MD   [facility])    [address] [place] [address]  [id number] [phone]              Future tests that were ordered for you today     Open Future Orders        Priority Expected Expires Ordered    ECHO COMPLETE WITH OPTISON Routine 6/15/2017 5/3/2018 5/3/2017            Who to contact     If you have questions or need follow up information about today's clinic visit or your schedule please contact [facility] directly at [phone].  Normal or non-critical lab and imaging results will be communicated to you by [employer], letter or phone within 4 business days after the clinic has received the results. If you do not hear from us within 7 days, please contact the clinic through [employer] or phone. If you have a critical or abnormal lab result, we will notify you by phone as soon as possible.  Submit refill requests through [employer] or call your pharmacy and they will forward the refill request to us. Please allow 3 business days for your refill to be completed.          Additional Information About Your Visit        [employer] Information     [employer] lets you send messages to your doctor, view your test results, renew your prescriptions, schedule appointments and more. To sign up, go to [email or web address] . Click on \"Log in\" on the left side of the screen, which will take you to the Welcome page. Then click on \"Sign up Now\" on the right side of the page.     You will be asked to enter the access code listed below, as well as some personal information. Please follow the directions to create your username and password.     Your access code is: [email or web address]  Expires: 2017 11:43 AM     Your access code will  in 90 days. If you need help or a new code, please call your [facility] clinic or [phone].        Care EveryWhere ID     This is your Care EveryWhere ID. This could be used by other organizations to access your [facility] medical records  [id number]        Your Vitals Were     Pulse Height Pulse Oximetry BMI (Body Mass Index)          73 1.753 " "m (5' 9\") 94% 33.88 kg/m2         Blood Pressure from Last 3 Encounters:   06/15/17 96/59   05/03/17 106/67   04/08/17 96/57    Weight from Last 3 Encounters:   06/15/17 104.1 kg (229 lb 6.4 oz)   05/03/17 99.6 kg (219 lb 8 oz)   04/08/17 102 kg (224 lb 12.8 oz)              Today, you had the following     No orders found for display         Today's Medication Changes          These changes are accurate as of: 6/15/17  3:41 PM.  If you have any questions, ask your nurse or doctor.               These medicines have changed or have updated prescriptions.        Dose/Directions    hydrALAZINE 100 MG Tabs tablet   Commonly known as:  APRESOLINE   This may have changed:  how much to take   Used for:  Acute on chronic systolic heart failure (H)   Changed by:  Lorena Watkins MD        Dose:  50 mg   Take 0.5 tablets (50 mg) by mouth 3 times daily   Quantity:  120 tablet   Refills:  3       isosorbide dinitrate 20 MG tablet   Commonly known as:  ISORDIL   This may have changed:    - medication strength  - how much to take  - when to take this   Used for:  Acute on chronic systolic heart failure (H)   Changed by:  Lorena Watkins MD        Dose:  20 mg   Take 1 tablet (20 mg) by mouth 3 times daily   Quantity:  270 tablet   Refills:  3       lisinopril 10 MG tablet   Commonly known as:  PRINIVIL/ZESTRIL   This may have changed:    - medication strength  - how much to take  - when to take this  - additional instructions   Used for:  Chronic systolic heart failure (H), Acute on chronic systolic heart failure (H)   Changed by:  Lorena Watkins MD        Dose:  10 mg   Take 1 tablet (10 mg) by mouth daily Take 5 mg in AM, 10 mg in evening, in one wk take 10 mg twice daily   Quantity:  180 tablet   Refills:  3       metoprolol 25 MG 24 hr tablet   Commonly known as:  TOPROL-XL   This may have changed:    - how much to take  - additional instructions   Used for:  Acute on chronic systolic heart failure " (H)   Changed by:  Lorena Watkins MD        Dose:  50 mg   Take 2 tablets (50 mg) by mouth daily In 2 weeks increase to two 25 mg tab.   Quantity:  60 tablet   Refills:  3            Where to get your medicines      These medications were sent to [facility]/pharmacy #[id number] - [facility]  [address] [address]     Phone:  [phone]     isosorbide dinitrate 20 MG tablet    lisinopril 10 MG tablet    metoprolol 25 MG 24 hr tablet                Primary Care Provider Office Phone # Fax #    Bashir Hines [phone]       [facility] [address] [facility] [address]        Thank you!     Thank you for choosing [facility]  for your care. Our goal is always to provide you with excellent care. Hearing back from our patients is one way we can continue to improve our services. Please take a few minutes to complete the written survey that you may receive in the mail after your visit with us. Thank you!             Your Updated Medication List - Protect others around you: Learn how to safely use, store and throw away your medicines at www.disposemymeds.org.          This list is accurate as of: 6/15/17  3:41 PM.  Always use your most recent med list.                   Brand Name Dispense Instructions for use    aspirin 81 MG EC tablet     30 tablet    Take 1 tablet (81 mg) by mouth daily       atorvastatin 10 MG tablet    LIPITOR    90 tablet    TAKE 1 TABLET BY MOUTH EVERY DAY       digoxin 250 MCG tablet    LANOXIN    30 tablet    Take 1 tablet (250 mcg) by mouth daily       furosemide 40 MG tablet    LASIX    30 tablet    Take 1 tablet (40 mg) by mouth daily       hydrALAZINE 100 MG Tabs tablet    APRESOLINE    120 tablet    Take 0.5 tablets (50 mg) by mouth 3 times daily       isosorbide dinitrate 20 MG tablet    ISORDIL    270 tablet    Take 1 tablet (20 mg) by mouth 3 times daily       lisinopril 10 MG tablet    PRINIVIL/ZESTRIL     180 tablet    Take 1 tablet (10 mg) by mouth daily Take 5 mg in AM, 10 mg in evening, in one wk take 10 mg twice daily       metoprolol 25 MG 24 hr tablet    TOPROL-XL    60 tablet    Take 2 tablets (50 mg) by mouth daily In 2 weeks increase to two 25 mg tab.       spironolactone 25 MG tablet    ALDACTONE    30 tablet    Take 1 tablet (25 mg) by mouth daily       traZODone 100 MG tablet    DESYREL    90 tablet    Take 1 tablet (100 mg) by mouth nightly as needed for sleep

## 2017-06-15 NOTE — NURSING NOTE
Chief Complaint   Patient presents with     New Patient     dischage follow up for NICM, echo and CPX today/ labs     Vitals were taken and medications were reconciled.     Earnest Benjamin MA  1:44 PM

## 2017-06-15 NOTE — MR AVS SNAPSHOT
After Visit Summary   6/15/2017    Danielito Chu    MRN: [id number]           Patient Information     Date Of Birth          1955        Visit Information        Provider Department      6/15/2017 6:00 AM 1, Uc Cv Device [facility]        Today's Diagnoses     Nonischemic cardiomyopathy (H)    -  1       Follow-ups after your visit        Your next 10 appointments already scheduled     Oct 19, 2017  2:30 PM CDT   Lab with UC LAB   [facility])    [address] [place] [id number]   [phone]            Oct 19, 2017  3:00 PM CDT   (Arrive by 2:45 PM)   Implanted Defibulator with Uc Cv Device 1   [facility] ([facility])    [address] [place] [id number] [phone]            Oct 19, 2017  3:30 PM CDT   (Arrive by 3:15 PM)   RETURN HEART FAILURE with Lorena Watkins MD   [facility] ([facility])    [address] [place] [id number] [phone]              Future tests that were ordered for you today     Open Future Orders        Priority Expected Expires Ordered    ECHO COMPLETE WITH OPTISON Routine 6/15/2017 5/3/2018 5/3/2017            Who to contact     If you have questions or need follow up information about today's clinic visit or your schedule please contact [facility] directly at [phone].  Normal or non-critical lab and imaging results will be communicated to you by MyChart, letter or phone within 4 business days after the clinic has received the results. If you do not hear from us within 7 days, please contact the clinic through MyChart or phone. If you have a critical or abnormal lab result, we will notify you by phone as soon as possible.  Submit refill requests through [employer] or call your pharmacy and they will forward the refill request to us. Please allow 3  "business days for your refill to be completed.          Additional Information About Your Visit        MyChart Information     [employer] lets you send messages to your doctor, view your test results, renew your prescriptions, schedule appointments and more. To sign up, go to [email or web address] . Click on \"Log in\" on the left side of the screen, which will take you to the Welcome page. Then click on \"Sign up Now\" on the right side of the page.     You will be asked to enter the access code listed below, as well as some personal information. Please follow the directions to create your username and password.     Your access code is: [email or web address]  Expires: 2017 11:43 AM     Your access code will  in 90 days. If you need help or a new code, please call your [facility] clinic or [phone].        Care EveryWhere ID     This is your Care EveryWhere ID. This could be used by other organizations to access your [facility] medical records  [id number]         Blood Pressure from Last 3 Encounters:   06/15/17 96/59   17 106/67   17 96/57    Weight from Last 3 Encounters:   06/15/17 104.1 kg (229 lb 6.4 oz)   17 99.6 kg (219 lb 8 oz)   17 102 kg (224 lb 12.8 oz)              We Performed the Following     ICD DEVICE PROGRAMMING EVAL, SINGLE LEAD ICD          Today's Medication Changes          These changes are accurate as of: 6/15/17  4:30 PM.  If you have any questions, ask your nurse or doctor.               Start taking these medicines.        Dose/Directions    metoprolol 25 MG 24 hr tablet   Commonly known as:  TOPROL-XL   Used for:  Acute on chronic systolic heart failure (H)   Started by:  Lorena Watkins MD        Dose:  50 mg   Take 2 tablets (50 mg) by mouth daily   Quantity:  60 tablet   Refills:  3         These medicines have changed or have updated prescriptions.        Dose/Directions    hydrALAZINE 100 MG Tabs tablet   Commonly known as:  APRESOLINE   This may have " changed:  how much to take   Used for:  Acute on chronic systolic heart failure (H)   Changed by:  Lorena Watkins MD        Dose:  50 mg   Take 0.5 tablets (50 mg) by mouth 3 times daily   Quantity:  120 tablet   Refills:  3       isosorbide dinitrate 20 MG tablet   Commonly known as:  ISORDIL   This may have changed:    - medication strength  - how much to take  - when to take this   Used for:  Acute on chronic systolic heart failure (H)   Changed by:  Lorena Watkins MD        Dose:  20 mg   Take 1 tablet (20 mg) by mouth 3 times daily   Quantity:  270 tablet   Refills:  3       lisinopril 10 MG tablet   Commonly known as:  PRINIVIL/ZESTRIL   This may have changed:    - medication strength  - how much to take  - when to take this  - additional instructions   Used for:  Chronic systolic heart failure (H), Acute on chronic systolic heart failure (H)   Changed by:  Lorena Watkins MD        Dose:  10 mg   Take 1 tablet (10 mg) by mouth daily Take 5 mg in AM, 10 mg in evening, in one wk take 10 mg twice daily   Quantity:  180 tablet   Refills:  3            Where to get your medicines      These medications were sent to [facility]/pharmacy #[id number] - [place] [id number]     Phone:  [phone]     isosorbide dinitrate 20 MG tablet    lisinopril 10 MG tablet                Primary Care Provider Office Phone # Fax #    Bashir Hines [phone]       [place] [id number]        Thank you!     Thank you for choosing [facility]  for your care. Our goal is always to provide you with excellent care. Hearing back from our patients is one way we can continue to improve our services. Please take a few minutes to complete the written survey that you may receive in the mail after your visit with us. Thank you!             Your Updated Medication List - Protect others around you: Learn how  to safely use, store and throw away your medicines at www.disposemymeds.org.          This list is accurate as of: 6/15/17  4:30 PM.  Always use your most recent med list.                   Brand Name Dispense Instructions for use    aspirin 81 MG EC tablet     30 tablet    Take 1 tablet (81 mg) by mouth daily       atorvastatin 10 MG tablet    LIPITOR    90 tablet    TAKE 1 TABLET BY MOUTH EVERY DAY       digoxin 250 MCG tablet    LANOXIN    30 tablet    Take 1 tablet (250 mcg) by mouth daily       furosemide 40 MG tablet    LASIX    30 tablet    Take 1 tablet (40 mg) by mouth daily       hydrALAZINE 100 MG Tabs tablet    APRESOLINE    120 tablet    Take 0.5 tablets (50 mg) by mouth 3 times daily       isosorbide dinitrate 20 MG tablet    ISORDIL    270 tablet    Take 1 tablet (20 mg) by mouth 3 times daily       lisinopril 10 MG tablet    PRINIVIL/ZESTRIL    180 tablet    Take 1 tablet (10 mg) by mouth daily Take 5 mg in AM, 10 mg in evening, in one wk take 10 mg twice daily       metoprolol 25 MG 24 hr tablet    TOPROL-XL    60 tablet    Take 2 tablets (50 mg) by mouth daily       spironolactone 25 MG tablet    ALDACTONE    30 tablet    Take 1 tablet (25 mg) by mouth daily       traZODone 100 MG tablet    DESYREL    90 tablet    Take 1 tablet (100 mg) by mouth nightly as needed for sleep

## 2017-06-15 NOTE — PATIENT INSTRUCTIONS
Decrease Hydralazine 50 mg three times daily  Decrease Isosorbide 20 mg three times daily/ new pills orded    Increase Lisinopril 5 mg in AM, 10 mg in evening, in 1 week increase 10 mg twice daily. I have ordered you 10 mg tablets.    Increase Metoprolol 50 mg daily,   2 weeks, after increasing Lisinopril,     Follow up with Dr. Watkins in Oct.    Don't get a flu shot until after we see you in Oct.    Joana Trevino, RN  Cardiology Care Coordinator  Please be aware that I work part-time but I will be checking messages several times per week.   For urgent needs, please call the number below.    [phone], press 1 for [employer], press 3 to speak to a nurse    .

## 2017-06-15 NOTE — PROGRESS NOTES
Pt seen in the [facility] for evaluation and iterative programming of a [employer], single lead ICD, per MD orders. Today his intrinsic rhythm is SR 75 bpm. Normal ICD function. 1 NSVT episode recorded lasting 11 beats.  <1%. Pt reports that he is feeling well. Battery estimates 12 years to [place]. Pt wishes to transfer his care to our device clinic. Plan for pt to RTC in 4 months with next MD appointment.  Single lead ICD

## 2017-06-15 NOTE — LETTER
6/15/2017      RE: Danielito Chu  [address] [address] [address] [address]       Dear Colleague,    Thank you for the opportunity to participate in the care of your patient, Danielito Chu, at the [facility]. Please see a copy of my visit note below.      Sharon 15, 2017      Bashir Hines MD   [facility]   [address] [place]  [address]       RE: Danielito Chu   MRN: [id number]   : 1955      Dear Dr. Hines:      I had the pleasure of seeing Mr. Danielito Chu in the [facility] on 06/15/2017.  As you know, he is a very pleasant 61-year-old man with a history of nonischemic cardiomyopathy with an ejection fraction at its dong of 15% who was admitted about 8 weeks ago at the [facility] for decompensated heart failure.  Due to refractory volume overload and difficulty diuresing, he was transferred down for consideration of advanced therapies.  Fortunately, he was able to be diuresed over 40 pounds and was placed on medical therapy which he has continued to tolerate.  He has since been discharged home.  He is on excellent doses of neurohormonal blockade without dizziness or lightheadedness.  He can walk 4 miles at a time without stopping.  He is not quite back to his former level of energy prior to his cardiomyopathy but still is feeling quite well.  He denies PND or orthopnea.  He has not been readmitted.  He denies lower extremity edema, increased abdominal girth.  His appetite is excellent.      He is accompanied by his sister today, and they are curious about whether or not he may need a transplant or an LVAD at some point and also about whether or not he may be able to return to work.       PAST MEDICAL HISTORY:  Past Medical History:   Diagnosis Date     Acute systolic congestive heart failure (H) 2017     HTN (hypertension)      Hyperlipidemia       Mitral regurgitation      Nonischemic cardiomyopathy (H) 2017     SHIRLEY (obstructive sleep apnea)      Pacemaker 2017    ICD 17       FAMILY HISTORY:  Family History   Problem Relation Age of Onset     Heart Failure Father       at age 86     Heart Failure Paternal Uncle       at 66      Myocardial Infarction Paternal Uncle       at age 62     Coronary Artery Disease Mother       during CABG at age 41       SOCIAL HISTORY:  Social History     Social History     Marital status: Single     Spouse name: N/A     Number of children: N/A     Years of education: N/A     Social History Main Topics     Smoking status: Former Smoker     Smokeless tobacco: None     Alcohol use None     Drug use: None     Sexual activity: Not Asked     Other Topics Concern     None       CURRENT MEDICATIONS:  Current Outpatient Prescriptions   Medication Sig Dispense Refill     atorvastatin (LIPITOR) 10 MG tablet TAKE 1 TABLET BY MOUTH EVERY DAY 90 tablet 3     lisinopril (PRINIVIL/ZESTRIL) 5 MG tablet Take 1 tablet (5 mg) by mouth 2 times daily 60 tablet 1     aspirin EC 81 MG EC tablet Take 1 tablet (81 mg) by mouth daily 30 tablet 3     furosemide (LASIX) 40 MG tablet Take 1 tablet (40 mg) by mouth daily 30 tablet 1     digoxin (LANOXIN) 250 MCG tablet Take 1 tablet (250 mcg) by mouth daily 30 tablet 1     hydrALAZINE (APRESOLINE) 100 MG TABS tablet Take 1 tablet (100 mg) by mouth 3 times daily 120 tablet 3     isosorbide Dinitrate (ISORDIL) 40 MG TABS Take 1 tablet (40 mg) by mouth 3 times daily (before meals) 120 tablet 3     metoprolol (TOPROL-XL) 25 MG 24 hr tablet Take 1.5 tablets (37.5 mg) by mouth daily 60 tablet 3     spironolactone (ALDACTONE) 25 MG tablet Take 1 tablet (25 mg) by mouth daily 30 tablet 3     traZODone (DESYREL) 100 MG tablet Take 1 tablet (100 mg) by mouth nightly as needed for sleep 90 tablet 1       ROS:   Constitutional: No fever, chills, or sweats. Weight is stable   ENT: No visual  "disturbance, ear ache, epistaxis, sore throat.   Allergies/Immunologic: Negative.   Respiratory: No cough, hemoptysis.   Cardiovascular: As per HPI.   GI: No nausea, vomiting, hematemesis, melena, or hematochezia.   : No urinary frequency, dysuria, or hematuria.   Integument: Negative.   Psychiatric: Negative.   Neuro: Negative.   Endocrinology: Negative.   Musculoskeletal: Negative.    EXAM:  BP 96/59 (BP Location: Left arm, Patient Position: Chair, Cuff Size: Adult Regular)  Pulse 73  Ht 1.753 m (5' 9\")  Wt 104.1 kg (229 lb 6.4 oz)  SpO2 94%  BMI 33.88 kg/m2  General: appears comfortable, alert and articulate  Head: normocephalic, atraumatic  Eyes: anicteric sclera, EOMI  Neck: no adenopathy  Orophyarynx: moist mucosa, no lesions, dentition intact  Heart: PMI focal, regular, S1/S2, no murmur, gallop, rub, estimated JVP < 10 cm   Lungs: clear, no rales or wheezing  Abdomen: soft, non-tender, bowel sounds present, no hepatosplenomegaly  Extremities: no clubbing, cyanosis or edema  Neurological: normal speech and affect, no gross motor deficits    Labs:  CBC RESULTS:  Lab Results   Component Value Date    WBC 8.9 04/08/2017    RBC 4.67 04/08/2017    HGB 15.3 04/08/2017    HCT 46.8 04/08/2017     04/08/2017    MCH 32.8 04/08/2017    MCHC 32.7 04/08/2017    RDW 14.8 04/08/2017     04/08/2017       CMP RESULTS:  Lab Results   Component Value Date     06/15/2017    POTASSIUM 3.9 06/15/2017    CHLORIDE 102 06/15/2017    CO2 22 06/15/2017    ANIONGAP 9 06/15/2017     (H) 06/15/2017    BUN 17 06/15/2017    CR 0.90 06/15/2017    GFRESTIMATED 86 06/15/2017    GFRESTBLACK >90   GFR Calc   06/15/2017    ASHLEE 8.8 06/15/2017    BILITOTAL 0.9 04/06/2017    ALBUMIN 3.0 (L) 04/06/2017    ALKPHOS 92 04/06/2017    ALT 40 04/06/2017    AST 26 04/06/2017        INR RESULTS:  Lab Results   Component Value Date    INR 1.20 (H) 03/29/2017       Lab Results   Component Value Date    MAG 2.2 " 04/08/2017     Lab Results   Component Value Date    NTBNPI 2597 (H) 03/29/2017     Lab Results   Component Value Date    NTBNP 1252 (H) 05/03/2017              Procedure  Echocardiogram with two-dimensional, color and spectral Doppler performed.  Contrast Optison. Optison (NDC #0077-2771-15) given intravenously. Patient was  given 6 ml mixture of 3 ml Optison and 6 ml saline. 3 ml wasted. IV start  location LAC .     Interpretation Summary  The LVEF is visually estimated in the 30 % range (calculated, 28 %.) Severe  diffuse hypokinesis with inferior wall akinesis is present.  Mild to moderate right ventricular dilation is present. Global right  ventricular function is normal.  Mild aortic insufficiency is present.  Pulmonary artery systolic pressure is normal.  Dilation of the inferior vena cava is present with normal respiratory  variation in diameter. Estimated mean right atrial pressure is 8 mmHg.  No pericardial effusion is present.  Previous study not available for comparison.    Left Ventricle  Left ventricular size is normal. Left ventricular wall thickness is normal.  The LVEF is visually estimated in the 30 % range (calculated, 28 %.). Grade II  or moderate diastolic dysfunction. Severe diffuse hypokinesis is present.  Inferior wall akinesis is present.     Right Ventricle  Global right ventricular function is normal. Mild to moderate right  ventricular dilation is present. A pacemaker lead is noted in the right  ventricle.     Atria  The right atria appears normal. Mild left atrial enlargement is present.        Mitral Valve  The mitral valve is normal. Mild mitral insufficiency is present.     Aortic Valve  Mild aortic insufficiency is present.     Tricuspid Valve  The tricuspid valve is normal. Trace to mild tricuspid insufficiency is  present. Pulmonary artery systolic pressure is normal. The right ventricular  systolic pressure is approximated at 28.4 mmHg plus the right atrial pressure.     Pulmonic  Valve  The pulmonic valve is normal.     Vessels  The aorta root is normal. Dilation of the inferior vena cava is present with  normal respiratory variation in diameter. Estimated mean right atrial pressure  is 8 mmHg.     Pericardium  No pericardial effusion is present.      MMode/2D Measurements & Calculations  IVSd: 0.97 cm  LVIDd: 5.4 cm  LVIDs: 4.7 cm  LVPWd: 1.2 cm  FS: 13.6 %  EDV(Teich): 143.0 ml  ESV(Teich): 101.9 ml  LV mass(C)d: 226.9 grams  LV mass(C)dI: 105.7 grams/m2  Ao root diam: 3.7 cm  asc Aorta Diam: 3.1 cm  LVOT diam: 2.3 cm  LVOT area: 4.2 cm2  LA Volume (BP): 74.7 ml  TAPSE: 2.7 cm     Doppler Measurements & Calculations  MV E max jose: 67.3 cm/sec  MV A max jose: 92.0 cm/sec  MV E/A: 0.73  MV dec slope: 238.5 cm/sec2  MV dec time: 0.28 sec  TV max P.4 mmHg  TR max jose: 266.3 cm/sec  TR max P.4 mmHg     Lateral E/e': 19.2  Medial E/e': 14.4      Coronary angiogram and cardiac catheterization on 2017:  Right dominant left main, no angiographic evidence of disease.  LAD type-3 vessel with septal perforators and 4 diagonal vessels without angiographic disease.  RCA no angiographically significant disease.  RA 12, RV 63/15, PA 63/35, mean of 48, wedge 25, Timmy cardiac index 1.2, Timmy cardiac output 1.6, PA saturation 59%.  Pulmonary vascular resistance 6.6.  Cardiopulmonary stress test performed today shows a peak VO2 of 20 mL/kg/m2.       Assessment and Plan:   In summary, this is a very pleasant, 62-year-old man with a history of nonischemic cardiomyopathy who was transferred from [place] for consideration of advanced therapies about 2 months ago.  He was decompensated at that time but actually responded very well to medical therapy, has tolerated afterload reduction increases as well as he was able to be diuresed quite easily.  He is euvolemic today and New York Heart Association functional class II.  His ejection fraction has improved to 30%.  His peak VO2 is very reassuring today  at 20 mL/kg/m2.  Overall, I feel that he has a very favorable prognosis.  We would like to continue to optimize his medical therapy further, and to that end, we are starting to deescalate his hydralazine, nitrates and increase his lisinopril steadily over the next few weeks (see titration schedule in patient instructions)  After these changes, we will increase his beta-blocker as listed below.  I will look forward to seeing him back in the early fall, and he is interested in being part of the influenza trial.     2.  Sudden cardiac death prevention.  He has a single-lead ICD.   3.  Primary prevention.  He remains on a statin.   4.  Obesity.  He is continuing to exercise and do cardiac rehab.   5.  Please feel free to contact me if you have any further questions.      Sincerely,      Lorena Watkins MD   Cardiology Staff         CC  Patient Care Team:  Bashir Hines as PCP - General (Family Practice)  June, Lorena Lindsey MD as MD (Cardiology)  Sparkle Joel RN as Nurse Coordinator (Cardiology)

## 2017-06-15 NOTE — PROGRESS NOTES
Sharon 15, 2017      Bashir Hines MD   [facility]   [address] [place]  [address]       RE: Danielito Chu   MRN: [id number]   : 1955      Dear Dr. Hines:      I had the pleasure of seeing Mr. Danielito Chu in the [facility] on 06/15/2017.  As you know, he is a very pleasant 61-year-old man with a history of nonischemic cardiomyopathy with an ejection fraction at its dong of 15% who was admitted about 8 weeks ago at the [facility] for decompensated heart failure.  Due to refractory volume overload and difficulty diuresing, he was transferred down for consideration of advanced therapies.  Fortunately, he was able to be diuresed over 40 pounds and was placed on medical therapy which he has continued to tolerate.  He has since been discharged home.  He is on excellent doses of neurohormonal blockade without dizziness or lightheadedness.  He can walk 4 miles at a time without stopping.  He is not quite back to his former level of energy prior to his cardiomyopathy but still is feeling quite well.  He denies PND or orthopnea.  He has not been readmitted.  He denies lower extremity edema, increased abdominal girth.  His appetite is excellent.      He is accompanied by his sister today, and they are curious about whether or not he may need a transplant or an LVAD at some point and also about whether or not he may be able to return to work.       PAST MEDICAL HISTORY:  Past Medical History:   Diagnosis Date     Acute systolic congestive heart failure (H) 2017     HTN (hypertension)      Hyperlipidemia      Mitral regurgitation      Nonischemic cardiomyopathy (H) 2017     SHIRLEY (obstructive sleep apnea)      Pacemaker 2017    ICD 17       FAMILY HISTORY:  Family History   Problem Relation Age of Onset     Heart Failure Father       at age 86     Heart Failure Paternal Uncle       at 66       Myocardial Infarction Paternal Uncle       at age 62     Coronary Artery Disease Mother       during CABG at age 41       SOCIAL HISTORY:  Social History     Social History     Marital status: Single     Spouse name: N/A     Number of children: N/A     Years of education: N/A     Social History Main Topics     Smoking status: Former Smoker     Smokeless tobacco: None     Alcohol use None     Drug use: None     Sexual activity: Not Asked     Other Topics Concern     None       CURRENT MEDICATIONS:  Current Outpatient Prescriptions   Medication Sig Dispense Refill     atorvastatin (LIPITOR) 10 MG tablet TAKE 1 TABLET BY MOUTH EVERY DAY 90 tablet 3     lisinopril (PRINIVIL/ZESTRIL) 5 MG tablet Take 1 tablet (5 mg) by mouth 2 times daily 60 tablet 1     aspirin EC 81 MG EC tablet Take 1 tablet (81 mg) by mouth daily 30 tablet 3     furosemide (LASIX) 40 MG tablet Take 1 tablet (40 mg) by mouth daily 30 tablet 1     digoxin (LANOXIN) 250 MCG tablet Take 1 tablet (250 mcg) by mouth daily 30 tablet 1     hydrALAZINE (APRESOLINE) 100 MG TABS tablet Take 1 tablet (100 mg) by mouth 3 times daily 120 tablet 3     isosorbide Dinitrate (ISORDIL) 40 MG TABS Take 1 tablet (40 mg) by mouth 3 times daily (before meals) 120 tablet 3     metoprolol (TOPROL-XL) 25 MG 24 hr tablet Take 1.5 tablets (37.5 mg) by mouth daily 60 tablet 3     spironolactone (ALDACTONE) 25 MG tablet Take 1 tablet (25 mg) by mouth daily 30 tablet 3     traZODone (DESYREL) 100 MG tablet Take 1 tablet (100 mg) by mouth nightly as needed for sleep 90 tablet 1       ROS:   Constitutional: No fever, chills, or sweats. Weight is stable   ENT: No visual disturbance, ear ache, epistaxis, sore throat.   Allergies/Immunologic: Negative.   Respiratory: No cough, hemoptysis.   Cardiovascular: As per HPI.   GI: No nausea, vomiting, hematemesis, melena, or hematochezia.   : No urinary frequency, dysuria, or hematuria.   Integument: Negative.   Psychiatric:  "Negative.   Neuro: Negative.   Endocrinology: Negative.   Musculoskeletal: Negative.    EXAM:  BP 96/59 (BP Location: Left arm, Patient Position: Chair, Cuff Size: Adult Regular)  Pulse 73  Ht 1.753 m (5' 9\")  Wt 104.1 kg (229 lb 6.4 oz)  SpO2 94%  BMI 33.88 kg/m2  General: appears comfortable, alert and articulate  Head: normocephalic, atraumatic  Eyes: anicteric sclera, EOMI  Neck: no adenopathy  Orophyarynx: moist mucosa, no lesions, dentition intact  Heart: PMI focal, regular, S1/S2, no murmur, gallop, rub, estimated JVP < 10 cm   Lungs: clear, no rales or wheezing  Abdomen: soft, non-tender, bowel sounds present, no hepatosplenomegaly  Extremities: no clubbing, cyanosis or edema  Neurological: normal speech and affect, no gross motor deficits    Labs:  CBC RESULTS:  Lab Results   Component Value Date    WBC 8.9 04/08/2017    RBC 4.67 04/08/2017    HGB 15.3 04/08/2017    HCT 46.8 04/08/2017     04/08/2017    MCH 32.8 04/08/2017    MCHC 32.7 04/08/2017    RDW 14.8 04/08/2017     04/08/2017       CMP RESULTS:  Lab Results   Component Value Date     06/15/2017    POTASSIUM 3.9 06/15/2017    CHLORIDE 102 06/15/2017    CO2 22 06/15/2017    ANIONGAP 9 06/15/2017     (H) 06/15/2017    BUN 17 06/15/2017    CR 0.90 06/15/2017    GFRESTIMATED 86 06/15/2017    GFRESTBLACK >90   GFR Calc   06/15/2017    ASHLEE 8.8 06/15/2017    BILITOTAL 0.9 04/06/2017    ALBUMIN 3.0 (L) 04/06/2017    ALKPHOS 92 04/06/2017    ALT 40 04/06/2017    AST 26 04/06/2017        INR RESULTS:  Lab Results   Component Value Date    INR 1.20 (H) 03/29/2017       Lab Results   Component Value Date    MAG 2.2 04/08/2017     Lab Results   Component Value Date    NTBNPI 2597 (H) 03/29/2017     Lab Results   Component Value Date    NTBNP 1252 (H) 05/03/2017              Procedure  Echocardiogram with two-dimensional, color and spectral Doppler performed.  Contrast Optison. Optison (NDC #4227-7611-34) given " intravenously. Patient was  given 6 ml mixture of 3 ml Optison and 6 ml saline. 3 ml wasted. IV start  location LAC .     Interpretation Summary  The LVEF is visually estimated in the 30 % range (calculated, 28 %.) Severe  diffuse hypokinesis with inferior wall akinesis is present.  Mild to moderate right ventricular dilation is present. Global right  ventricular function is normal.  Mild aortic insufficiency is present.  Pulmonary artery systolic pressure is normal.  Dilation of the inferior vena cava is present with normal respiratory  variation in diameter. Estimated mean right atrial pressure is 8 mmHg.  No pericardial effusion is present.  Previous study not available for comparison.    Left Ventricle  Left ventricular size is normal. Left ventricular wall thickness is normal.  The LVEF is visually estimated in the 30 % range (calculated, 28 %.). Grade II  or moderate diastolic dysfunction. Severe diffuse hypokinesis is present.  Inferior wall akinesis is present.     Right Ventricle  Global right ventricular function is normal. Mild to moderate right  ventricular dilation is present. A pacemaker lead is noted in the right  ventricle.     Atria  The right atria appears normal. Mild left atrial enlargement is present.        Mitral Valve  The mitral valve is normal. Mild mitral insufficiency is present.     Aortic Valve  Mild aortic insufficiency is present.     Tricuspid Valve  The tricuspid valve is normal. Trace to mild tricuspid insufficiency is  present. Pulmonary artery systolic pressure is normal. The right ventricular  systolic pressure is approximated at 28.4 mmHg plus the right atrial pressure.     Pulmonic Valve  The pulmonic valve is normal.     Vessels  The aorta root is normal. Dilation of the inferior vena cava is present with  normal respiratory variation in diameter. Estimated mean right atrial pressure  is 8 mmHg.     Pericardium  No pericardial effusion is present.      MMode/2D Measurements &  Calculations  IVSd: 0.97 cm  LVIDd: 5.4 cm  LVIDs: 4.7 cm  LVPWd: 1.2 cm  FS: 13.6 %  EDV(Teich): 143.0 ml  ESV(Teich): 101.9 ml  LV mass(C)d: 226.9 grams  LV mass(C)dI: 105.7 grams/m2  Ao root diam: 3.7 cm  asc Aorta Diam: 3.1 cm  LVOT diam: 2.3 cm  LVOT area: 4.2 cm2  LA Volume (BP): 74.7 ml  TAPSE: 2.7 cm     Doppler Measurements & Calculations  MV E max jose: 67.3 cm/sec  MV A max jose: 92.0 cm/sec  MV E/A: 0.73  MV dec slope: 238.5 cm/sec2  MV dec time: 0.28 sec  TV max P.4 mmHg  TR max jose: 266.3 cm/sec  TR max P.4 mmHg     Lateral E/e': 19.2  Medial E/e': 14.4      Coronary angiogram and cardiac catheterization on 2017:  Right dominant left main, no angiographic evidence of disease.  LAD type-3 vessel with septal perforators and 4 diagonal vessels without angiographic disease.  RCA no angiographically significant disease.  RA 12, RV 63/15, PA 63/35, mean of 48, wedge 25, Timmy cardiac index 1.2, Timmy cardiac output 1.6, PA saturation 59%.  Pulmonary vascular resistance 6.6.  Cardiopulmonary stress test performed today shows a peak VO2 of 20 mL/kg/m2.       Assessment and Plan:   In summary, this is a very pleasant, 62-year-old man with a history of nonischemic cardiomyopathy who was transferred from [place] for consideration of advanced therapies about 2 months ago.  He was decompensated at that time but actually responded very well to medical therapy, has tolerated afterload reduction increases as well as he was able to be diuresed quite easily.  He is euvolemic today and New York Heart Association functional class II.  His ejection fraction has improved to 30%.  His peak VO2 is very reassuring today at 20 mL/kg/m2.  Overall, I feel that he has a very favorable prognosis.  We would like to continue to optimize his medical therapy further, and to that end, we are starting to deescalate his hydralazine, nitrates and increase his lisinopril steadily over the next few weeks (see titration schedule in  patient instructions)  After these changes, we will increase his beta-blocker as listed below.  I will look forward to seeing him back in the early fall, and he is interested in being part of the influenza trial.     2.  Sudden cardiac death prevention.  He has a single-lead ICD.   3.  Primary prevention.  He remains on a statin.   4.  Obesity.  He is continuing to exercise and do cardiac rehab.   5.  Please feel free to contact me if you have any further questions.      Sincerely,            Lorena Watkins MD   Cardiology Staff         CC  Patient Care Team:  Bashir Hines as PCP - General (Family Practice)  Lorena Watkins MD as MD (Cardiology)  Sparkle Joel, RN as Nurse Coordinator (Cardiology)

## 2017-07-22 DIAGNOSIS — I50.23 ACUTE ON CHRONIC SYSTOLIC HEART FAILURE (H): ICD-10-CM

## 2017-07-23 RX ORDER — DIGOXIN 250 MCG
TABLET ORAL
Qty: 30 TABLET | Refills: 1 | Status: SHIPPED | OUTPATIENT
Start: 2017-07-23 | End: 2018-04-29

## 2017-07-23 RX ORDER — DIGOXIN 250 MCG
250 TABLET ORAL DAILY
Qty: 90 TABLET | Refills: 3 | Status: SHIPPED | OUTPATIENT
Start: 2017-07-23 | End: 2017-07-23

## 2017-08-09 DIAGNOSIS — I50.22 CHRONIC SYSTOLIC HEART FAILURE (H): ICD-10-CM

## 2017-08-18 ENCOUNTER — TELEPHONE (OUTPATIENT)
Dept: CARDIOLOGY | Facility: CLINIC | Age: 62
End: 2017-08-18

## 2017-08-18 ENCOUNTER — CARE COORDINATION (OUTPATIENT)
Dept: CARDIOLOGY | Facility: CLINIC | Age: 62
End: 2017-08-18

## 2017-08-18 NOTE — PROGRESS NOTES
Received message below from triage today:      Patient calling needing return to work letter.  He has been back to work for 2 weeks and they just told him he needed this.  You can fax letter to [phone] Attn:  Nathaly James.  He would like to have this reach his employer by Monday.  Any questions you can reach him @ [phone].      I called and spoke with patient.  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  He does no heavy lifting.  He has been back since 8/7 on a full time basis once his short term disability was up. Fabiano feels well and has been stable without c/o any type.  He learned today that he will not be paid unless a letter is sent to his employer.    Patient has seen Dr. Watkins recently and will return in October for f/u.    We wiil draft a letter to his employer and will review with Dr. Watkins prior to her signing off.

## 2017-08-22 ENCOUNTER — CARE COORDINATION (OUTPATIENT)
Dept: CARDIOLOGY | Facility: CLINIC | Age: 62
End: 2017-08-22

## 2017-08-22 NOTE — PROGRESS NOTES
Return to work statement fax'd to patient's employer today.  Copy of statement can be seen in letters section of patient's EMR.

## 2017-10-06 DIAGNOSIS — I50.23 ACUTE ON CHRONIC SYSTOLIC HEART FAILURE (H): ICD-10-CM

## 2017-10-09 DIAGNOSIS — I42.8 NONISCHEMIC CARDIOMYOPATHY (H): Primary | ICD-10-CM

## 2017-10-09 RX ORDER — HYDRALAZINE HYDROCHLORIDE 50 MG/1
50 TABLET, FILM COATED ORAL 3 TIMES DAILY
Qty: 270 TABLET | Refills: 3 | Status: SHIPPED | OUTPATIENT
Start: 2017-10-09 | End: 2017-11-27

## 2017-10-09 RX ORDER — HYDRALAZINE HYDROCHLORIDE 100 MG/1
TABLET, FILM COATED ORAL
Qty: 120 TABLET | Refills: 2 | Status: SHIPPED
Start: 2017-10-09 | End: 2017-10-09 | Stop reason: DRUGHIGH

## 2017-10-11 ENCOUNTER — PRE VISIT (OUTPATIENT)
Dept: CARDIOLOGY | Facility: CLINIC | Age: 62
End: 2017-10-11

## 2017-10-11 DIAGNOSIS — I42.8 NONISCHEMIC CARDIOMYOPATHY (H): Primary | ICD-10-CM

## 2017-10-19 ENCOUNTER — ALLIED HEALTH/NURSE VISIT (OUTPATIENT)
Dept: CARDIOLOGY | Facility: CLINIC | Age: 62
End: 2017-10-19
Attending: INTERNAL MEDICINE
Payer: COMMERCIAL

## 2017-10-19 ENCOUNTER — RESEARCH ENCOUNTER (OUTPATIENT)
Dept: RESEARCH | Facility: CLINIC | Age: 62
End: 2017-10-19

## 2017-10-19 VITALS
HEART RATE: 75 BPM | WEIGHT: 236.6 LBS | SYSTOLIC BLOOD PRESSURE: 106 MMHG | HEIGHT: 69 IN | OXYGEN SATURATION: 95 % | DIASTOLIC BLOOD PRESSURE: 57 MMHG | BODY MASS INDEX: 35.04 KG/M2

## 2017-10-19 DIAGNOSIS — N52.2 DRUG-INDUCED IMPOTENCE: ICD-10-CM

## 2017-10-19 DIAGNOSIS — I42.8 NONISCHEMIC CARDIOMYOPATHY (H): Primary | ICD-10-CM

## 2017-10-19 DIAGNOSIS — I42.8 NONISCHEMIC CARDIOMYOPATHY (H): ICD-10-CM

## 2017-10-19 DIAGNOSIS — I50.22 CHRONIC SYSTOLIC CONGESTIVE HEART FAILURE (H): Primary | ICD-10-CM

## 2017-10-19 LAB
ANION GAP SERPL CALCULATED.3IONS-SCNC: 7 MMOL/L (ref 3–14)
BUN SERPL-MCNC: 14 MG/DL (ref 7–30)
CALCIUM SERPL-MCNC: 9 MG/DL (ref 8.5–10.1)
CHLORIDE SERPL-SCNC: 100 MMOL/L (ref 94–109)
CO2 SERPL-SCNC: 28 MMOL/L (ref 20–32)
CREAT SERPL-MCNC: 0.97 MG/DL (ref 0.66–1.25)
GFR SERPL CREATININE-BSD FRML MDRD: 79 ML/MIN/1.7M2
GLUCOSE SERPL-MCNC: 146 MG/DL (ref 70–99)
POTASSIUM SERPL-SCNC: 4.4 MMOL/L (ref 3.4–5.3)
SODIUM SERPL-SCNC: 134 MMOL/L (ref 133–144)

## 2017-10-19 PROCEDURE — 99212 OFFICE O/P EST SF 10 MIN: CPT | Mod: ZF

## 2017-10-19 PROCEDURE — 93289 INTERROG DEVICE EVAL HEART: CPT | Mod: 26 | Performed by: INTERNAL MEDICINE

## 2017-10-19 PROCEDURE — 99214 OFFICE O/P EST MOD 30 MIN: CPT | Mod: ZP | Performed by: INTERNAL MEDICINE

## 2017-10-19 PROCEDURE — 36415 COLL VENOUS BLD VENIPUNCTURE: CPT | Performed by: INTERNAL MEDICINE

## 2017-10-19 PROCEDURE — 80048 BASIC METABOLIC PNL TOTAL CA: CPT | Performed by: INTERNAL MEDICINE

## 2017-10-19 RX ORDER — SILDENAFIL 50 MG/1
50 TABLET, FILM COATED ORAL DAILY PRN
Qty: 20 TABLET | Refills: 1 | Status: ON HOLD | OUTPATIENT
Start: 2017-10-19 | End: 2018-11-21

## 2017-10-19 ASSESSMENT — PAIN SCALES - GENERAL: PAINLEVEL: NO PAIN (0)

## 2017-10-19 NOTE — PROGRESS NOTES
2017     Bashir Hines MD   [facility]   [address] [place]  [address]       RE: Danielito Chu   MRN: [id number]   : 1955      Dear Dr. Hines:      I had the pleasure of seeing Mr. Danielito Chu in the [facility] on 2017.     As you know, he is a very pleasant 62-year-old man with a history of nonischemic cardiomyopathy with an ejection fraction at its dong of 15% who was admitted in 2017 to the [facility] for decompensated heart failure.  Due to refractory volume overload and difficulty diuresing, he was transferred down for consideration of advanced therapies.  Fortunately, he was able to be diuresed over 40 pounds and was placed on medical therapy which he has continued to tolerate.  He has since been discharged home.  He is on excellent doses of neurohormonal blockade without dizziness or lightheadedness.  He can walk 4 miles at a time without stopping.      Overall he has done better and better since the time of discharge.  He is not quite back to his former level of energy prior to his cardiomyopathy but still is feeling quite well.  He denies PND or orthopnea.  He has not been readmitted.  He denies lower extremity edema, increased abdominal girth.  His appetite is excellent.       PAST MEDICAL HISTORY:  Past Medical History:   Diagnosis Date     Acute systolic congestive heart failure (H) 2017     HTN (hypertension)      Hyperlipidemia      Mitral regurgitation      Nonischemic cardiomyopathy (H) 2017     SHIRLEY (obstructive sleep apnea)      Pacemaker 2017    ICD 17       FAMILY HISTORY:  Family History   Problem Relation Age of Onset     Heart Failure Father       at age 86     Heart Failure Paternal Uncle       at 66      Myocardial Infarction Paternal Uncle       at age 62     Coronary Artery Disease Mother       during CABG at age 41       SOCIAL  HISTORY:  Social History     Social History     Marital status: Single     Spouse name: N/A     Number of children: N/A     Years of education: N/A     Social History Main Topics     Smoking status: Former Smoker     Smokeless tobacco: None     Alcohol use None     Drug use: None     Sexual activity: Not Asked       CURRENT MEDICATIONS:  Current Outpatient Prescriptions   Medication Sig Dispense Refill     hydrALAZINE (APRESOLINE) 50 MG tablet Take 1 tablet (50 mg) by mouth 3 times daily 270 tablet 3     digoxin (LANOXIN) 250 MCG tablet TAKE 1 TABLET BY MOUTH EVERY DAY 30 tablet 1     lisinopril (PRINIVIL/ZESTRIL) 10 MG tablet Take 1 tablet (10 mg) by mouth daily Take 5 mg in AM, 10 mg in evening, in one wk take 10 mg twice daily 180 tablet 3     isosorbide dinitrate (ISORDIL) 20 MG tablet Take 1 tablet (20 mg) by mouth 3 times daily 270 tablet 3     metoprolol (TOPROL-XL) 25 MG 24 hr tablet Take 2 tablets (50 mg) by mouth daily 60 tablet 3     atorvastatin (LIPITOR) 10 MG tablet TAKE 1 TABLET BY MOUTH EVERY DAY 90 tablet 3     aspirin EC 81 MG EC tablet Take 1 tablet (81 mg) by mouth daily 30 tablet 3     furosemide (LASIX) 40 MG tablet Take 1 tablet (40 mg) by mouth daily 30 tablet 1     spironolactone (ALDACTONE) 25 MG tablet Take 1 tablet (25 mg) by mouth daily 30 tablet 3     traZODone (DESYREL) 100 MG tablet Take 1 tablet (100 mg) by mouth nightly as needed for sleep (Patient not taking: Reported on 10/19/2017) 90 tablet 1       ROS:   Constitutional: No fever, chills, or sweats. Weight is stable   ENT: No visual disturbance, ear ache, epistaxis, sore throat.   Allergies/Immunologic: Negative.   Respiratory: No cough, hemoptysis.   Cardiovascular: As per HPI.   GI: No nausea, vomiting, hematemesis, melena, or hematochezia.   : No urinary frequency, dysuria, or hematuria.  + erectile dysfunction   Integument: Negative.   Psychiatric: Negative.   Neuro: Negative.   Endocrinology: Negative.   Musculoskeletal:  "Negative.    EXAM:  /57  Pulse 75  Ht 1.753 m (5' 9\")  Wt 107.3 kg (236 lb 9.6 oz)  SpO2 95%  BMI 34.94 kg/m2  General: appears comfortable, alert and articulate  Head: normocephalic, atraumatic  Eyes: anicteric sclera, EOMI  Neck: no adenopathy  Orophyarynx: moist mucosa, no lesions, dentition intact  Heart: PMI diffuse,  regular, S1/S2, no murmur, gallop, rub, estimated JVP < 10 cm   Lungs: clear, no rales or wheezing  Abdomen: soft, non-tender, bowel sounds present, no hepatosplenomegaly  Extremities: no clubbing, cyanosis or edema  Neurological: normal speech and affect, no gross motor deficits    Labs:  CBC RESULTS:  Lab Results   Component Value Date    WBC 8.9 04/08/2017    RBC 4.67 04/08/2017    HGB 15.3 04/08/2017    HCT 46.8 04/08/2017     04/08/2017    MCH 32.8 04/08/2017    MCHC 32.7 04/08/2017    RDW 14.8 04/08/2017     04/08/2017       CMP RESULTS:  Lab Results   Component Value Date     10/19/2017    POTASSIUM 4.4 10/19/2017    CHLORIDE 100 10/19/2017    CO2 28 10/19/2017    ANIONGAP 7 10/19/2017     (H) 10/19/2017    BUN 14 10/19/2017    CR 0.97 10/19/2017    GFRESTIMATED 79 10/19/2017    GFRESTBLACK >90 10/19/2017    ASHLEE 9.0 10/19/2017    BILITOTAL 0.9 04/06/2017    ALBUMIN 3.0 (L) 04/06/2017    ALKPHOS 92 04/06/2017    ALT 40 04/06/2017    AST 26 04/06/2017        INR RESULTS:  Lab Results   Component Value Date    INR 1.20 (H) 03/29/2017       Lab Results   Component Value Date    MAG 2.2 04/08/2017     Lab Results   Component Value Date    NTBNPI 2597 (H) 03/29/2017     Lab Results   Component Value Date    NTBNP 1252 (H) 05/03/2017              Procedure    Repeat echocardiogram today personally reviewed- LV ejection fraction 30%, normal IVC, normal PA pressures, normal valve function no paracardial effusion.     Coronary angiogram and cardiac catheterization on 04/03/2017:  Right dominant left main, no angiographic evidence of disease.  LAD type-3 vessel " with septal perforators and 4 diagonal vessels without angiographic disease.  RCA no angiographically significant disease.  RA 12, RV 63/15, PA 63/35, mean of 48, wedge 25, Timmy cardiac index 1.2, Timmy cardiac output 1.6, PA saturation 59%.  Pulmonary vascular resistance 6.6.      Cardiopulmonary stress test performed today shows a peak VO2 of 20 mL/kg/m2.     Assessment and Plan:   In summary, this is a very pleasant, 62-year-old man with a history of nonischemic cardiomyopathy who was transferred from [place] for consideration of advanced therapies about 6 months ago.  He was decompensated at that time but actually responded very well to medical therapy, has tolerated afterload reduction increases as well as he was able to be diuresed quite easily.  He is euvolemic today and New York Heart Association functional class II.  His ejection fraction has improved to 30%.  His peak VO2 is very reassuring today at 20 mL/kg/m2.  Overall, I feel that he has a very favorable prognosis.  We would like to continue to optimize his medical therapy further.     We talked a lot today about the mortality benefit of Entresto and he would have qualified for this trial based on his current ejection fraction and N-terminal proBNP.     For his Entresto start, we will begin 24/26 b.i.d. he will stop his lisinopril 2 days prior. We will do labs one week after starting and in a month if his blood pressure is tolerating this we will double his dose with a BMP the following week.     Once is interested in that higher dose, I would like to increase his beta blocker over the phone before his return to me in 6 months.     Other-   2.  Sudden cardiac death prevention.  He has a single-lead ICD.   3.  Primary prevention.  He remains on a statin.   4.  Obesity.  He is continuing to exercise and do cardiac rehab.   5.  erectile dysfunction- I am stopping his nitrates today and adding sildenafil 50 mg p.r.n. erectile dysfunction. He will contact me if  this does not work      Sincerely,      Lorena Watkins MD   Cardiology Staff         CC  Patient Care Team:  Bashir Hines as PCP - General (Family Practice)  June, Lorena Lindsey MD as MD (Cardiology)  Sparkle Joel RN as Nurse Coordinator (Cardiology)

## 2017-10-19 NOTE — PATIENT INSTRUCTIONS
It was a pleasure to see you in clinic today. Please do not hesitate to call with any questions or concerns. You are scheduled for a remote ICD transmission on 1/22/18. This will happen automatically in the night. We will call in 1-2 business days to discuss the results with you. We look forward to seeing you in clinic at your next device check in 6 months.    Justina Benson RN  Electrophysiology Nurse Clinician  [facility]  During business hours call:  [phone]  After business hours please call: [phone]- select option #4 and ask for job code [phone].

## 2017-10-19 NOTE — LETTER
10/19/2017    RE: Danielito Chu  [address] [place] [address]     Dear Colleague,    Thank you for the opportunity to participate in the care of your patient, Danielito Chu, at the [facility]. Please see a copy of my visit note below.    2017     Bashir Hines MD   [facility] [address] [facility]   [address] [place]  [address]       RE: Danielito Chu   MRN: [id number]   : 1955      Dear Dr. Hines:      I had the pleasure of seeing Mr. Danielito Chu in the [facility] on 2017.     As you know, he is a very pleasant 62-year-old man with a history of nonischemic cardiomyopathy with an ejection fraction at its dong of 15% who was admitted in  to the [facility] for decompensated heart failure.  Due to refractory volume overload and difficulty diuresing, he was transferred down for consideration of advanced therapies.  Fortunately, he was able to be diuresed over 40 pounds and was placed on medical therapy which he has continued to tolerate.  He has since been discharged home.  He is on excellent doses of neurohormonal blockade without dizziness or lightheadedness.  He can walk 4 miles at a time without stopping.        Overall he has done better and better since the time of discharge.  He is not quite back to his former level of energy prior to his cardiomyopathy but still is feeling quite well.  He denies PND or orthopnea.  He has not been readmitted.  He denies lower extremity edema, increased abdominal girth.  His appetite is excellent.     PAST MEDICAL HISTORY:  Past Medical History:   Diagnosis Date     Acute systolic congestive heart failure (H) 2017     HTN (hypertension)      Hyperlipidemia      Mitral regurgitation      Nonischemic cardiomyopathy (H) 2017     SHIRLEY (obstructive sleep apnea)      Pacemaker 2017    ICD  17       FAMILY HISTORY:  Family History   Problem Relation Age of Onset     Heart Failure Father       at age 86     Heart Failure Paternal Uncle       at 66      Myocardial Infarction Paternal Uncle       at age 62     Coronary Artery Disease Mother       during CABG at age 41     SOCIAL HISTORY:  Social History     Social History     Marital status: Single     Spouse name: N/A     Number of children: N/A     Years of education: N/A     Social History Main Topics     Smoking status: Former Smoker     Smokeless tobacco: None     Alcohol use None     Drug use: None     Sexual activity: Not Asked       CURRENT MEDICATIONS:  Current Outpatient Prescriptions   Medication Sig Dispense Refill     hydrALAZINE (APRESOLINE) 50 MG tablet Take 1 tablet (50 mg) by mouth 3 times daily 270 tablet 3     digoxin (LANOXIN) 250 MCG tablet TAKE 1 TABLET BY MOUTH EVERY DAY 30 tablet 1     lisinopril (PRINIVIL/ZESTRIL) 10 MG tablet Take 1 tablet (10 mg) by mouth daily Take 5 mg in AM, 10 mg in evening, in one wk take 10 mg twice daily 180 tablet 3     isosorbide dinitrate (ISORDIL) 20 MG tablet Take 1 tablet (20 mg) by mouth 3 times daily 270 tablet 3     metoprolol (TOPROL-XL) 25 MG 24 hr tablet Take 2 tablets (50 mg) by mouth daily 60 tablet 3     atorvastatin (LIPITOR) 10 MG tablet TAKE 1 TABLET BY MOUTH EVERY DAY 90 tablet 3     aspirin EC 81 MG EC tablet Take 1 tablet (81 mg) by mouth daily 30 tablet 3     furosemide (LASIX) 40 MG tablet Take 1 tablet (40 mg) by mouth daily 30 tablet 1     spironolactone (ALDACTONE) 25 MG tablet Take 1 tablet (25 mg) by mouth daily 30 tablet 3     traZODone (DESYREL) 100 MG tablet Take 1 tablet (100 mg) by mouth nightly as needed for sleep (Patient not taking: Reported on 10/19/2017) 90 tablet 1     ROS:   Constitutional: No fever, chills, or sweats. Weight is stable   ENT: No visual disturbance, ear ache, epistaxis, sore throat.   Allergies/Immunologic: Negative.  "  Respiratory: No cough, hemoptysis.   Cardiovascular: As per HPI.   GI: No nausea, vomiting, hematemesis, melena, or hematochezia.   : No urinary frequency, dysuria, or hematuria.  + erectile dysfunction   Integument: Negative.   Psychiatric: Negative.   Neuro: Negative.   Endocrinology: Negative.   Musculoskeletal: Negative.    EXAM:  /57  Pulse 75  Ht 1.753 m (5' 9\")  Wt 107.3 kg (236 lb 9.6 oz)  SpO2 95%  BMI 34.94 kg/m2  General: appears comfortable, alert and articulate  Head: normocephalic, atraumatic  Eyes: anicteric sclera, EOMI  Neck: no adenopathy  Orophyarynx: moist mucosa, no lesions, dentition intact  Heart: PMI diffuse,  regular, S1/S2, no murmur, gallop, rub, estimated JVP < 10 cm   Lungs: clear, no rales or wheezing  Abdomen: soft, non-tender, bowel sounds present, no hepatosplenomegaly  Extremities: no clubbing, cyanosis or edema  Neurological: normal speech and affect, no gross motor deficits    Labs:  CBC RESULTS:  Lab Results   Component Value Date    WBC 8.9 04/08/2017    RBC 4.67 04/08/2017    HGB 15.3 04/08/2017    HCT 46.8 04/08/2017     04/08/2017    MCH 32.8 04/08/2017    MCHC 32.7 04/08/2017    RDW 14.8 04/08/2017     04/08/2017     CMP RESULTS:  Lab Results   Component Value Date     10/19/2017    POTASSIUM 4.4 10/19/2017    CHLORIDE 100 10/19/2017    CO2 28 10/19/2017    ANIONGAP 7 10/19/2017     (H) 10/19/2017    BUN 14 10/19/2017    CR 0.97 10/19/2017    GFRESTIMATED 79 10/19/2017    GFRESTBLACK >90 10/19/2017    ASHLEE 9.0 10/19/2017    BILITOTAL 0.9 04/06/2017    ALBUMIN 3.0 (L) 04/06/2017    ALKPHOS 92 04/06/2017    ALT 40 04/06/2017    AST 26 04/06/2017      INR RESULTS:  Lab Results   Component Value Date    INR 1.20 (H) 03/29/2017       Lab Results   Component Value Date    MAG 2.2 04/08/2017     Lab Results   Component Value Date    NTBNPI 2597 (H) 03/29/2017     Lab Results   Component Value Date    NTBNP 1252 (H) 05/03/2017 "          Procedure    Repeat echocardiogram today personally reviewed- LV ejection fraction 30%, normal IVC, normal PA pressures, normal valve function no paracardial effusion.     Coronary angiogram and cardiac catheterization on 04/03/2017:  Right dominant left main, no angiographic evidence of disease.  LAD type-3 vessel with septal perforators and 4 diagonal vessels without angiographic disease.  RCA no angiographically significant disease.  RA 12, RV 63/15, PA 63/35, mean of 48, wedge 25, Timmy cardiac index 1.2, Timmy cardiac output 1.6, PA saturation 59%.  Pulmonary vascular resistance 6.6.      Cardiopulmonary stress test performed today shows a peak VO2 of 20 mL/kg/m2.     Assessment and Plan:   In summary, this is a very pleasant, 62-year-old man with a history of nonischemic cardiomyopathy who was transferred from [place] for consideration of advanced therapies about 6 months ago.  He was decompensated at that time but actually responded very well to medical therapy, has tolerated afterload reduction increases as well as he was able to be diuresed quite easily.  He is euvolemic today and New York Heart Association functional class II.  His ejection fraction has improved to 30%.  His peak VO2 is very reassuring today at 20 mL/kg/m2.  Overall, I feel that he has a very favorable prognosis.  We would like to continue to optimize his medical therapy further.     We talked a lot today about the mortality benefit of Entresto and he would have qualified for this trial based on his current ejection fraction and N-terminal proBNP.     For his Entresto start, we will begin 24/26 b.i.d. he will stop his lisinopril 2 days prior. We will do labs one week after starting and in a month if his blood pressure is tolerating this we will double his dose with a BMP the following week.     Once is interested in that higher dose, I would like to increase his beta blocker over the phone before his return to me in 6 months.      Other-   2.  Sudden cardiac death prevention.  He has a single-lead ICD.   3.  Primary prevention.  He remains on a statin.   4.  Obesity.  He is continuing to exercise and do cardiac rehab.   5.  erectile dysfunction- I am stopping his nitrates today and adding sildenafil 50 mg p.r.n. erectile dysfunction. He will contact me if this does not work    Sincerely,      Lorena Watkins MD   Cardiology Staff     CC  Patient Care Team:  Bashir Hines as PCP - General (Family Practice)  Lorena Watkins MD as MD (Cardiology)  Sparkle Joel, RN as Nurse Coordinator (Cardiology)

## 2017-10-19 NOTE — MR AVS SNAPSHOT
After Visit Summary   10/19/2017    Danielito Chu    MRN: [id number]           Patient Information     Date Of Birth          1955        Visit Information        Provider Department      10/19/2017 12:30 PM 1, Uc Cv Device [facility]        Today's Diagnoses     Nonischemic cardiomyopathy (H)    -  1      Care Instructions    It was a pleasure to see you in clinic today. Please do not hesitate to call with any questions or concerns. You are scheduled for a remote ICD transmission on 1/22/18. This will happen automatically in the night. We will call in 1-2 business days to discuss the results with you. We look forward to seeing you in clinic at your next device check in 6 months.    Justina Benson, RN  Electrophysiology Nurse Clinician  [facility]  During business hours call:  [phone]  After business hours please call: [phone]- select option #4 and ask for job code [id number].            Follow-ups after your visit        Follow-up notes from your care team     Return in about 6 months (around 4/19/2018) for [place], ICD check.      Your next 10 appointments already scheduled     Feb 22, 2018  2:30 PM CST   Lab with UC LAB   [facility] ([facility])    [address] [place] [id number] [phone]            Feb 22, 2018  3:00 PM CST   (Arrive by 2:45 PM)   Implanted Defibulator with Uc Cv Device 1   [facility] ([facility])    [address] [place] [address] [id number] [phone]            Feb 22, 2018  3:30 PM CST   (Arrive by 3:15 PM)   RETURN HEART FAILURE with Lorena Watkins MD   [facility] ([facility])    [address] [place] [address] [id number] [phone]              Who to contact     If you have questions or need follow up information about today's clinic visit or your  "schedule please contact [facility] directly at [phone].  Normal or non-critical lab and imaging results will be communicated to you by MyChart, letter or phone within 4 business days after the clinic has received the results. If you do not hear from us within 7 days, please contact the clinic through [employer] or phone. If you have a critical or abnormal lab result, we will notify you by phone as soon as possible.  Submit refill requests through [employer] or call your pharmacy and they will forward the refill request to us. Please allow 3 business days for your refill to be completed.          Additional Information About Your Visit        [employer] Information     [employer] lets you send messages to your doctor, view your test results, renew your prescriptions, schedule appointments and more. To sign up, go to [email or web address] . Click on \"Log in\" on the left side of the screen, which will take you to the Welcome page. Then click on \"Sign up Now\" on the right side of the page.     You will be asked to enter the access code listed below, as well as some personal information. Please follow the directions to create your username and password.     Your access code is: [email or web address]  Expires: 1/3/2018  6:30 AM     Your access code will  in 90 days. If you need help or a new code, please call your [place] clinic or [phone].        Care EveryWhere ID     This is your Care EveryWhere ID. This could be used by other organizations to access your [place] medical records  [id number]         Blood Pressure from Last 3 Encounters:   10/19/17 106/57   06/15/17 96/59   17 106/67    Weight from Last 3 Encounters:   10/19/17 107.3 kg (236 lb 9.6 oz)   06/15/17 104.1 kg (229 lb 6.4 oz)   17 99.6 kg (219 lb 8 oz)              Today, you had the following     No orders found for display         Today's Medication Changes          These changes are accurate as of: 10/19/17  2:14 PM.  If you have any " questions, ask your nurse or doctor.               Start taking these medicines.        Dose/Directions    sacubitril-valsartan 24-26 MG per tablet   Commonly known as:  ENTRESTO   Used for:  Chronic systolic congestive heart failure (H)   Started by:  Lorena Watkins MD        Dose:  1 tablet   Take 1 tablet by mouth 2 times daily   Quantity:  60 tablet   Refills:  11       sildenafil 50 MG tablet   Commonly known as:  VIAGRA   Used for:  Drug-induced impotence   Started by:  Lorena Watkins MD        Dose:  50 mg   Take 1 tablet (50 mg) by mouth daily as needed   Quantity:  20 tablet   Refills:  1         Stop taking these medicines if you haven't already. Please contact your care team if you have questions.     isosorbide dinitrate 20 MG tablet   Commonly known as:  ISORDIL   Stopped by:  Lorena Watkins MD           lisinopril 10 MG tablet   Commonly known as:  PRINIVIL/ZESTRIL   Stopped by:  Lorena Watkins MD                Where to get your medicines      These medications were sent to [facility]/pharmacy #[id number] - [address] [address]     Phone:  [phone]     sacubitril-valsartan 24-26 MG per tablet    sildenafil 50 MG tablet                Primary Care Provider Office Phone # Fax #    Bashir Hines [phone]       [address] [address]        Equal Access to Services     JONELLE RODGERS AH: Hadii andres fuenteso Sotabatha, waaxda luqadaha, qaybta kaalmada adeaydenyada, aaron pearson. So [facility] [phone].    ATENCIÓN: Si habla español, tiene a stokes disposición servicios gratuitos de asistencia lingüística. Kam al [phone].    We comply with applicable federal civil rights laws and [place] laws. We do not discriminate on the basis of race, color, national origin, age, disability, sex, sexual orientation, or gender identity.            Thank  you!     Thank you for choosing [facility]  for your care. Our goal is always to provide you with excellent care. Hearing back from our patients is one way we can continue to improve our services. Please take a few minutes to complete the written survey that you may receive in the mail after your visit with us. Thank you!             Your Updated Medication List - Protect others around you: Learn how to safely use, store and throw away your medicines at www.disposemymeds.org.          This list is accurate as of: 10/19/17  2:14 PM.  Always use your most recent med list.                   Brand Name Dispense Instructions for use Diagnosis    aspirin 81 MG EC tablet     30 tablet    Take 1 tablet (81 mg) by mouth daily    Acute systolic congestive heart failure (H)       atorvastatin 10 MG tablet    LIPITOR    90 tablet    TAKE 1 TABLET BY MOUTH EVERY DAY    Acute systolic congestive heart failure (H)       digoxin 250 MCG tablet    LANOXIN    30 tablet    TAKE 1 TABLET BY MOUTH EVERY DAY    Acute on chronic systolic heart failure (H)       furosemide 40 MG tablet    LASIX    30 tablet    Take 1 tablet (40 mg) by mouth daily    Acute systolic congestive heart failure (H)       hydrALAZINE 50 MG tablet    APRESOLINE    270 tablet    Take 1 tablet (50 mg) by mouth 3 times daily    Nonischemic cardiomyopathy (H)       metoprolol 25 MG 24 hr tablet    TOPROL-XL    60 tablet    Take 2 tablets (50 mg) by mouth daily    Acute on chronic systolic heart failure (H)       sacubitril-valsartan 24-26 MG per tablet    ENTRESTO    60 tablet    Take 1 tablet by mouth 2 times daily    Chronic systolic congestive heart failure (H)       sildenafil 50 MG tablet    VIAGRA    20 tablet    Take 1 tablet (50 mg) by mouth daily as needed    Drug-induced impotence       spironolactone 25 MG tablet    ALDACTONE    30 tablet    Take 1 tablet (25 mg) by mouth daily    Acute on chronic systolic heart failure (H)       traZODone 100 MG  tablet    DESYREL    90 tablet    Take 1 tablet (100 mg) by mouth nightly as needed for sleep    Insomnia, unspecified type

## 2017-10-19 NOTE — NURSING NOTE
Chief Complaint   Patient presents with     Follow Up For     4 mo. follow up for HF/ labs/ device ck   Vitals were taken and medications were reconciled.     Earnest Benjamin MA  12:50 PM

## 2017-10-19 NOTE — MR AVS SNAPSHOT
After Visit Summary   10/19/2017    Danielito Chu    MRN: [id number]           Patient Information     Date Of Birth          1955        Visit Information        Provider Department      10/19/2017 1:00 PM Lorena Watkins MD [facility]        Today's Diagnoses     Chronic systolic congestive heart failure (H)    -  1    Drug-induced impotence          Care Instructions    Recommendation from your visit today:      1.  Start Entresto 24/26 mg tablet - one tab twice/day.  I will send to your pharmacy to see if insurance will pay.  If not, we      may need to send in a prior authorization    2.  Stop lisinopril 2 full days prior to stating entresto.    3.  Stop Isordil (isosorbide) before using viagra.    4.  May use viagra 50 mg tablet - take 1/2 tab (25 mg) one hour prior to activity as needed.    5.  Blood work about one week after starting the entresto.    6.  If you tolerate the entresto, about a month after you are on the low dose, we may increase the dose.      For questions regarding your appointment today please call [phone] and press option #1 for University, then option #3 to speak with a nurse.                Follow-ups after your visit        Follow-up notes from your care team     Return 4 month - appt for ICD check, labs, appt with June.      Your next 10 appointments already scheduled     Feb 22, 2018  2:30 PM CST   Lab with  LAB   [facility])    [address] [place] [address]  [address]st [facility] [id number] [phone]            Feb 22, 2018  3:00 PM CST   (Arrive by 2:45 PM)   Implanted Defibulator with  Cv Device 1   [facility] ([facility])    [address] [place] [address]  3[address] [facility] [id number] [phone]            Feb 22, 2018  3:30 PM CST   (Arrive by 3:15 PM)   RETURN HEART FAILURE with Lorena Watkins MD   [facility] ([facility]  "[facility])    [address] [facility]  3rd [facility] [id number] [phone]              Who to contact     If you have questions or need follow up information about today's clinic visit or your schedule please contact [facility] directly at [phone].  Normal or non-critical lab and imaging results will be communicated to you by MyChart, letter or phone within 4 business days after the clinic has received the results. If you do not hear from us within 7 days, please contact the clinic through MyChart or phone. If you have a critical or abnormal lab result, we will notify you by phone as soon as possible.  Submit refill requests through [employer] or call your pharmacy and they will forward the refill request to us. Please allow 3 business days for your refill to be completed.          Additional Information About Your Visit        MyChart Information     [employer] lets you send messages to your doctor, view your test results, renew your prescriptions, schedule appointments and more. To sign up, go to [email or web address] . Click on \"Log in\" on the left side of the screen, which will take you to the Welcome page. Then click on \"Sign up Now\" on the right side of the page.     You will be asked to enter the access code listed below, as well as some personal information. Please follow the directions to create your username and password.     Your access code is: [email or web address]  Expires: 1/3/2018  6:30 AM     Your access code will  in 90 days. If you need help or a new code, please call your [place] clinic or [phone].        Care EveryWhere ID     This is your Care EveryWhere ID. This could be used by other organizations to access your [place] medical records  [id number]        Your Vitals Were     Pulse Height Pulse Oximetry BMI (Body Mass Index)          75 1.753 m (5' 9\") 95% 34.94 kg/m2         Blood Pressure from Last 3 Encounters:   10/19/17 106/57   06/15/17 96/59   17 " 106/67    Weight from Last 3 Encounters:   10/19/17 107.3 kg (236 lb 9.6 oz)   06/15/17 104.1 kg (229 lb 6.4 oz)   05/03/17 99.6 kg (219 lb 8 oz)              Today, you had the following     No orders found for display         Today's Medication Changes          These changes are accurate as of: 10/19/17  2:14 PM.  If you have any questions, ask your nurse or doctor.               Start taking these medicines.        Dose/Directions    sacubitril-valsartan 24-26 MG per tablet   Commonly known as:  ENTRESTO   Used for:  Chronic systolic congestive heart failure (H)   Started by:  Lorena Watkins MD        Dose:  1 tablet   Take 1 tablet by mouth 2 times daily   Quantity:  60 tablet   Refills:  11       sildenafil 50 MG tablet   Commonly known as:  VIAGRA   Used for:  Drug-induced impotence   Started by:  Lorena Watkins MD        Dose:  50 mg   Take 1 tablet (50 mg) by mouth daily as needed   Quantity:  20 tablet   Refills:  1         Stop taking these medicines if you haven't already. Please contact your care team if you have questions.     isosorbide dinitrate 20 MG tablet   Commonly known as:  ISORDIL   Stopped by:  Lorena Watkins MD           lisinopril 10 MG tablet   Commonly known as:  PRINIVIL/ZESTRIL   Stopped by:  Lorena Watkins MD                Where to get your medicines      These medications were sent to [facility]/pharmacy #[id number] [address] [address]     Phone:  [phone]     sacubitril-valsartan 24-26 MG per tablet    sildenafil 50 MG tablet                Primary Care Provider Office Phone # Fax #    Bashir Flora [phone]       [address] [address]        Equal Access to Services     ALEX RODGERS AH: Eddie barrientos Sotabatha, waaxda luqadaha, qaybta kaalmada adeegyada, aaron pearson. [facility] [phone].    ATENCIÓN: Si  bret luna, tiene a stokes disposición servicios gratuitos de asistencia lingüística. Kam ruiz [phone].    We comply with applicable federal civil rights laws and [place] laws. We do not discriminate on the basis of race, color, national origin, age, disability, sex, sexual orientation, or gender identity.            Thank you!     Thank you for choosing [facility]  for your care. Our goal is always to provide you with excellent care. Hearing back from our patients is one way we can continue to improve our services. Please take a few minutes to complete the written survey that you may receive in the mail after your visit with us. Thank you!             Your Updated Medication List - Protect others around you: Learn how to safely use, store and throw away your medicines at www.disposemymeds.org.          This list is accurate as of: 10/19/17  2:14 PM.  Always use your most recent med list.                   Brand Name Dispense Instructions for use Diagnosis    aspirin 81 MG EC tablet     30 tablet    Take 1 tablet (81 mg) by mouth daily    Acute systolic congestive heart failure (H)       atorvastatin 10 MG tablet    LIPITOR    90 tablet    TAKE 1 TABLET BY MOUTH EVERY DAY    Acute systolic congestive heart failure (H)       digoxin 250 MCG tablet    LANOXIN    30 tablet    TAKE 1 TABLET BY MOUTH EVERY DAY    Acute on chronic systolic heart failure (H)       furosemide 40 MG tablet    LASIX    30 tablet    Take 1 tablet (40 mg) by mouth daily    Acute systolic congestive heart failure (H)       hydrALAZINE 50 MG tablet    APRESOLINE    270 tablet    Take 1 tablet (50 mg) by mouth 3 times daily    Nonischemic cardiomyopathy (H)       metoprolol 25 MG 24 hr tablet    TOPROL-XL    60 tablet    Take 2 tablets (50 mg) by mouth daily    Acute on chronic systolic heart failure (H)       sacubitril-valsartan 24-26 MG per tablet    ENTRESTO    60 tablet    Take 1 tablet by mouth 2 times daily    Chronic  systolic congestive heart failure (H)       sildenafil 50 MG tablet    VIAGRA    20 tablet    Take 1 tablet (50 mg) by mouth daily as needed    Drug-induced impotence       spironolactone 25 MG tablet    ALDACTONE    30 tablet    Take 1 tablet (25 mg) by mouth daily    Acute on chronic systolic heart failure (H)       traZODone 100 MG tablet    DESYREL    90 tablet    Take 1 tablet (100 mg) by mouth nightly as needed for sleep    Insomnia, unspecified type

## 2017-10-19 NOTE — PROGRESS NOTES
Trial: INfluenza Vaccine to Effectively Stop Cardio Thoracic Events and Decompensated heart failure (INVESTED)     IRB# [id number]       PI: Lorena Watkins MD (p) [phone]    :  Dai Ramsey RN (p) [phone] or Carin Marte RN (p) [phone]  Estimated duration of study: Up to 3 years    Subject was approached for possible participation in the above study. The current IRB approved consent form was reviewed and discussed at length with the subject.  This included purpose, research hypothesis, nature of the research, risks & benefits, and procedures required for screening, enrollment, randomization as well as all required follow up.  Confidentiality issues, compensation for injury, and alternative procedures available were also explained. Subject was informed that participation is voluntary and that refusal to participate will involve no penalty or decrease benefits to which the subject is otherwise entitled. Patient had the opportunity to read the entire written consent, ask questions and concerns of the study investigator and offered sufficient time to consider the research trial.  Patient was able to clearly state what study participation involved and the associated requirements.  All questions and concerns were addressed. Patient voluntarily signed the combined consent and HIPAA form.  prior to any research required tests / procedures and was given a copy of the signed form.

## 2017-10-20 NOTE — PROGRESS NOTES
Pt seen in clinic for evaluation and iterative programming of his [employer] single chamber ICD per MD order.  He looks well and he states that he feels well and he does not offer any complaints.  His ICD check shows 2 NSVT episodes since his last check, 171-226 bpm and 13-14 sec, pt does not recall feeling the episodes.  He is RV pacing <0.1% of the time, his heart rate histograms show great heart rate variation and his ICD battery estimates 12 years left.  He has a routine follow up today with Dr Watkins, we will plan to see him back in clinic 2/22/18 when he returns to see her again.    single ICD

## 2017-11-09 ENCOUNTER — TELEPHONE (OUTPATIENT)
Dept: CARDIOLOGY | Facility: CLINIC | Age: 62
End: 2017-11-09

## 2017-11-10 NOTE — TELEPHONE ENCOUNTER
[facility] Prior Authorization Team   Phone: [phone]  Fax: [phone]    PA Initiation    Medication: sacubitril-valsartan (ENTRESTO) 24-26 MG per tablet-Inititated-  Insurance Company: [employer] - Phone [phone] Fax [phone]  Pharmacy Filling the Rx: [id number] - [place], [place] - [address] [place] [address]  Filling Pharmacy Phone: [phone]  Filling Pharmacy Fax: [phone]  Start Date: 11/9/2017

## 2017-11-17 DIAGNOSIS — I50.23 ACUTE ON CHRONIC SYSTOLIC HEART FAILURE (H): ICD-10-CM

## 2017-11-17 RX ORDER — METOPROLOL SUCCINATE 25 MG/1
50 TABLET, EXTENDED RELEASE ORAL DAILY
Qty: 180 TABLET | Refills: 3 | Status: SHIPPED | OUTPATIENT
Start: 2017-11-17 | End: 2017-11-27

## 2017-11-21 ENCOUNTER — CARE COORDINATION (OUTPATIENT)
Dept: CARDIOLOGY | Facility: CLINIC | Age: 62
End: 2017-11-21

## 2017-11-21 NOTE — PROGRESS NOTES
Spoke with patient today.  He has not started the entresto yet as he had a lot of lisinopril left that he wanted to use up first.  He has picked up the first month of it which cost him $10.00.  However, when I spoke with his pharmacy about a week ago, they ran the script for entresto through and even though the med prior auth was approved, his copay would be approximately $224.00 per monnth.  The $10.00 copay was due to a coupon from the drug company.  The pharmacy did say they have another coupon he could use toward his copay up to a total of $1500.00 per year, but that would still make the med cost for him around $100.00 per month.  He would also physically go to the [facility] in person to activate the card.    As Fabiano is currently on vacation, he agreed to go to the pharmacy to activate the card and see how much it will cost out of pocket.  He will let me know sometime next week.

## 2017-11-27 ENCOUNTER — TELEPHONE (OUTPATIENT)
Dept: CARDIOLOGY | Facility: CLINIC | Age: 62
End: 2017-11-27

## 2017-11-27 DIAGNOSIS — I42.8 NONISCHEMIC CARDIOMYOPATHY (H): ICD-10-CM

## 2017-11-27 DIAGNOSIS — I50.21 ACUTE SYSTOLIC CONGESTIVE HEART FAILURE (H): ICD-10-CM

## 2017-11-27 DIAGNOSIS — I50.23 ACUTE ON CHRONIC SYSTOLIC HEART FAILURE (H): ICD-10-CM

## 2017-11-27 RX ORDER — HYDRALAZINE HYDROCHLORIDE 50 MG/1
50 TABLET, FILM COATED ORAL 3 TIMES DAILY
Qty: 30 TABLET | Refills: 1 | Status: ON HOLD | OUTPATIENT
Start: 2017-11-27 | End: 2018-06-11

## 2017-11-27 RX ORDER — LISINOPRIL 10 MG/1
10 TABLET ORAL 2 TIMES DAILY
Qty: 20 TABLET | Refills: 1 | Status: SHIPPED | OUTPATIENT
Start: 2017-11-27 | End: 2018-02-22 | Stop reason: DRUGHIGH

## 2017-11-27 RX ORDER — FUROSEMIDE 40 MG
40 TABLET ORAL DAILY
Qty: 10 TABLET | Refills: 1 | Status: SHIPPED | OUTPATIENT
Start: 2017-11-27 | End: 2018-02-22

## 2017-11-27 RX ORDER — METOPROLOL SUCCINATE 25 MG/1
50 TABLET, EXTENDED RELEASE ORAL DAILY
Qty: 20 TABLET | Refills: 1 | Status: SHIPPED | OUTPATIENT
Start: 2017-11-27 | End: 2018-03-29

## 2017-11-27 NOTE — TELEPHONE ENCOUNTER
[facility] pharmacy from [facility] calling reporting patient is visiting in [place], and per patient, forgot his medications.  He needs refills on Lisinopril, Furosemide, Metoprolol, and Hydralazine.  Phone number to pharmacy is [phone] and fax number is [phone].

## 2018-01-10 ENCOUNTER — TELEPHONE (OUTPATIENT)
Dept: CARDIOLOGY | Facility: CLINIC | Age: 63
End: 2018-01-10

## 2018-01-10 NOTE — TELEPHONE ENCOUNTER
Patient calling reporting for the last two weeks he has felt extremely fatigued.  Patient also reports that he is extremely thirsty and has been eating a lot of pop mark.  Patient is wondering if some of his meds need to be changed.  Please call to discuss. [phone].

## 2018-01-11 ENCOUNTER — TELEPHONE (OUTPATIENT)
Dept: CARDIOLOGY | Facility: CLINIC | Age: 63
End: 2018-01-11

## 2018-01-11 ENCOUNTER — CARE COORDINATION (OUTPATIENT)
Dept: CARDIOLOGY | Facility: CLINIC | Age: 63
End: 2018-01-11

## 2018-01-11 DIAGNOSIS — I42.8 NONISCHEMIC CARDIOMYOPATHY (H): Primary | ICD-10-CM

## 2018-01-11 DIAGNOSIS — I50.22 CHRONIC SYSTOLIC CONGESTIVE HEART FAILURE (H): ICD-10-CM

## 2018-01-11 NOTE — TELEPHONE ENCOUNTER
"S-(situation): received a call from [facility] with a critical lab value of glucose at 580. Last glucose done here in October was elevated at 146, no HgbA1c has ever been drawn. When asked how he feels he stated that he is \"doing okay, driving around and singing\". Asked patient if he has been drinking a lot lately, he stated that he has. Drinks a lot of orange juice in the morning. He also states that he has been urinating a lot lately. Then stated that this might be why he feels so badly lately    B-(background): 62-year-old man with a history of nonischemic cardiomyopathy with an ejection fraction at its dong of 15% who was admitted in 2017 to the [facility] for decompensated heart failure.  Due to refractory volume overload and difficulty diuresing, he was transferred down for consideration of advanced therapies.  Fortunately, he was able to be diuresed over 40 pounds and was placed on medical therapy which he has continued to tolerate.  He has since been discharged home.  He is on excellent doses of neurohormonal blockade without dizziness or lightheadedness.  He can walk 4 miles at a time without stopping.     A-(assessment): elevated glucose    R-(recommendations): go to ER, patient will call us tomorrow with update    "

## 2018-01-11 NOTE — PROGRESS NOTES
Received message that Fabiano called late yesterday afternoon to report he is having increased fatigue, c/o feeling thirsty, eating a lot of popsicles.  He was to start on entresto, but when I called him on 11/21 to discuss his copay for the drug, he stated he had not started it yet as he wanted to use up his lisinopril.  I called and spoke with him today.  He confirmed he has still not started the Entresto as he is still using up the lisinopril.      He c/o feeling very thirsty, drinking a lot of water, but not feeling it helps his thirst.  In addition, he reports he has lost 15 lbs without trying to lose and feels very fatigued.  He denies having recent labs drawn.  He is not diabetic, but recommend he get labs today or in am as the thirst and wt loss of 15 lbs is worrisome. Once labs are back, will review with Dr. Watkins for further recommendations.  Fabiano is agreeable and will go to his primary clinic this afternoon.  We will fax an order to the lab.

## 2018-01-12 ENCOUNTER — CARE COORDINATION (OUTPATIENT)
Dept: CARDIOLOGY | Facility: CLINIC | Age: 63
End: 2018-01-12

## 2018-01-12 NOTE — PROGRESS NOTES
"Spoke to patient yesterday in response to reported sx's of extreme thirst, frequent urination and unintentional weight loss of 15 lbs.  I requested he get labs drawn as soon as possible.  His lab apparently called back to our triage line late yesterday with a critical glucose level of 580 (pt previously not diabetic). Cardiology triage nurse sent patient to the local ER.      I called and spoke with patient this morning to f/u.  He went to the ER as requested and was given insulin. He reports his blood sugar was down to 200 when he was discharged home. The gave him a prescription for metformin which he had not filled yet. I encouraged him to get the metformin started and make an appointment with his primary.   He feels the elevated glucose is due to \"not eating well lately\". We had a discussion about the high level and that it isn't from diet alone and the possible complications of untreated diabetes. He reports he is going out of town soon, but doesn't know when he will return home. He agreed to  the metformin and make an appointment as soon as possible with his primary.    Of note, he still has not started the Entresto as he wanted to use up his supply of lisinopril first. Recommended he continue the lisinopril for now until his blood sugar is under control so he isn't starting and stopping several meds at the same time. Will update provider.  "

## 2018-02-13 ENCOUNTER — PRE VISIT (OUTPATIENT)
Dept: CARDIOLOGY | Facility: CLINIC | Age: 63
End: 2018-02-13

## 2018-02-13 DIAGNOSIS — I50.9 CHF (CONGESTIVE HEART FAILURE) (H): Primary | ICD-10-CM

## 2018-02-22 ENCOUNTER — ALLIED HEALTH/NURSE VISIT (OUTPATIENT)
Dept: CARDIOLOGY | Facility: CLINIC | Age: 63
End: 2018-02-22
Attending: INTERNAL MEDICINE
Payer: COMMERCIAL

## 2018-02-22 VITALS
BODY MASS INDEX: 32.29 KG/M2 | HEIGHT: 69 IN | SYSTOLIC BLOOD PRESSURE: 102 MMHG | HEART RATE: 94 BPM | WEIGHT: 218 LBS | DIASTOLIC BLOOD PRESSURE: 74 MMHG | OXYGEN SATURATION: 95 %

## 2018-02-22 DIAGNOSIS — I50.22 CHRONIC SYSTOLIC HEART FAILURE (H): Primary | ICD-10-CM

## 2018-02-22 DIAGNOSIS — I50.9 CHF (CONGESTIVE HEART FAILURE) (H): ICD-10-CM

## 2018-02-22 DIAGNOSIS — I42.8 NONISCHEMIC CARDIOMYOPATHY (H): Primary | ICD-10-CM

## 2018-02-22 DIAGNOSIS — I50.21 ACUTE SYSTOLIC CONGESTIVE HEART FAILURE (H): ICD-10-CM

## 2018-02-22 LAB
ALBUMIN SERPL-MCNC: 3.8 G/DL (ref 3.4–5)
ALP SERPL-CCNC: 67 U/L (ref 40–150)
ALT SERPL W P-5'-P-CCNC: 69 U/L (ref 0–70)
ANION GAP SERPL CALCULATED.3IONS-SCNC: 7 MMOL/L (ref 3–14)
AST SERPL W P-5'-P-CCNC: 41 U/L (ref 0–45)
BILIRUB SERPL-MCNC: 0.8 MG/DL (ref 0.2–1.3)
BUN SERPL-MCNC: 15 MG/DL (ref 7–30)
CALCIUM SERPL-MCNC: 8.7 MG/DL (ref 8.5–10.1)
CHLORIDE SERPL-SCNC: 103 MMOL/L (ref 94–109)
CHOLEST SERPL-MCNC: 95 MG/DL
CO2 SERPL-SCNC: 28 MMOL/L (ref 20–32)
CREAT SERPL-MCNC: 1.07 MG/DL (ref 0.66–1.25)
GFR SERPL CREATININE-BSD FRML MDRD: 70 ML/MIN/1.7M2
GLUCOSE SERPL-MCNC: 119 MG/DL (ref 70–99)
HDLC SERPL-MCNC: 23 MG/DL
LDLC SERPL CALC-MCNC: 22 MG/DL
NONHDLC SERPL-MCNC: 72 MG/DL
NT-PROBNP SERPL-MCNC: 3296 PG/ML (ref 0–125)
POTASSIUM SERPL-SCNC: 3.9 MMOL/L (ref 3.4–5.3)
PROT SERPL-MCNC: 7.4 G/DL (ref 6.8–8.8)
SODIUM SERPL-SCNC: 138 MMOL/L (ref 133–144)
TRIGL SERPL-MCNC: 251 MG/DL

## 2018-02-22 PROCEDURE — 80061 LIPID PANEL: CPT | Performed by: INTERNAL MEDICINE

## 2018-02-22 PROCEDURE — 93282 PRGRMG EVAL IMPLANTABLE DFB: CPT | Mod: ZF

## 2018-02-22 PROCEDURE — 80053 COMPREHEN METABOLIC PANEL: CPT | Performed by: INTERNAL MEDICINE

## 2018-02-22 PROCEDURE — 93289 INTERROG DEVICE EVAL HEART: CPT | Mod: 26 | Performed by: INTERNAL MEDICINE

## 2018-02-22 PROCEDURE — G0463 HOSPITAL OUTPT CLINIC VISIT: HCPCS | Mod: ZF

## 2018-02-22 PROCEDURE — 36415 COLL VENOUS BLD VENIPUNCTURE: CPT | Performed by: INTERNAL MEDICINE

## 2018-02-22 PROCEDURE — 83880 ASSAY OF NATRIURETIC PEPTIDE: CPT | Performed by: INTERNAL MEDICINE

## 2018-02-22 PROCEDURE — 99214 OFFICE O/P EST MOD 30 MIN: CPT | Mod: 25 | Performed by: INTERNAL MEDICINE

## 2018-02-22 RX ORDER — LISINOPRIL 10 MG/1
TABLET ORAL
Qty: 120 TABLET | Refills: 3 | Status: SHIPPED | OUTPATIENT
Start: 2018-02-22 | End: 2018-04-04

## 2018-02-22 RX ORDER — FUROSEMIDE 40 MG
40 TABLET ORAL 2 TIMES DAILY
Qty: 180 TABLET | Refills: 3 | Status: SHIPPED | OUTPATIENT
Start: 2018-02-22 | End: 2018-03-23 | Stop reason: ALTCHOICE

## 2018-02-22 ASSESSMENT — PAIN SCALES - GENERAL: PAINLEVEL: NO PAIN (0)

## 2018-02-22 NOTE — MR AVS SNAPSHOT
After Visit Summary   2/22/2018    Danielito Chu    MRN: [id number]           Patient Information     Date Of Birth          1955        Visit Information        Provider Department      2/22/2018 3:30 PM Lorena Watkins MD [facility]        Today's Diagnoses     Chronic systolic heart failure (H)    -  1    Acute systolic congestive heart failure (H)          Care Instructions    Increase lasix 40 mg twice daily  Increase Lisinopril 10 mg in AM, 15 mg in PM  Have labs in 2 weeks  In 2-3 weeks if you feel okay will increase Lisinopril to 15 mg in AM, 20 mg PM    Follow up with [facility] in 3 mos.  Follow up with Dr. Watkins in 4-5 months    Joana Trevino, RN  Cardiology Care Coordinator  Please be aware that I work part-time but I will be checking messages several times per week.   For urgent needs, please call the number below.    [phone], press 1 for [employer], press 3 to speak to a nurse    .          Follow-ups after your visit        Additional Services     Follow-Up with [facility]       With labs with Deysi MONTEMAYOR            Follow-Up with Advanced Heart Failure Cardiologist                 Your next 10 appointments already scheduled     May 02, 2018 12:00 PM CDT   Lab with [facility]   [facility])    [address]  1st Floor  [facility] [address] [phone]            May 02, 2018 12:30 PM CDT   (Arrive by 12:15 PM)   [facility] RETURN with JOSE A [facility] ([facility])    [address] [place] [address]  [address] [place] [id number] [phone]            Jul 19, 2018  2:00 PM CDT   Lab with [facility] LAB   [facility] ([facility])    [address]  1st Floor  [facility] [address] [phone]            Jul 19, 2018  2:30 PM CDT   (Arrive by 2:15 PM)   Implanted Defibulator with  Cv Device 1   [facility] ([facility]  [facility])    [address] [place] [address]  [address] [place] [id number] [phone]            2018  3:00 PM CDT   (Arrive by 2:45 PM)   RETURN HEART FAILURE with Lorena Watkins MD   [facility] ([facility])    [address] [place] [address]  [address] [place] [id number] [phone]              Who to contact     If you have questions or need follow up information about today's clinic visit or your schedule please contact [facility] directly at [phone].  Normal or non-critical lab and imaging results will be communicated to you by [email or web address], letter or phone within 4 business days after the clinic has received the results. If you do not hear from us within 7 days, please contact the clinic through [email or web address] or phone. If you have a critical or abnormal lab result, we will notify you by phone as soon as possible.  Submit refill requests through [employer] or call your pharmacy and they will forward the refill request to us. Please allow 3 business days for your refill to be completed.          Additional Information About Your Visit        MyChart Information     [employer] lets you send messages to your doctor, view your test results, renew your prescriptions, schedule appointments and more. To sign up, go to [email or web address] . Click on \"Log in\" on the left side of the screen, which will take you to the Welcome page. Then click on \"Sign up Now\" on the right side of the page.     You will be asked to enter the access code listed below, as well as some personal information. Please follow the directions to create your username and password.     Your access code is: [email or web address]  Expires: 2018  6:30 AM     Your access code will  in 90 days. If you need help or a new code, please call your [facility] clinic or [phone].        Care EveryWhere ID     This is your Care EveryWhere ID. This could be used by other organizations to access your " "[facility] medical records  [id number]        Your Vitals Were     Pulse Height Pulse Oximetry BMI (Body Mass Index)          94 1.753 m (5' 9\") 95% 32.19 kg/m2         Blood Pressure from Last 3 Encounters:   No data found for BP    Weight from Last 3 Encounters:   No data found for Wt              Today, you had the following     No orders found for display         Today's Medication Changes          These changes are accurate as of 2/22/18 11:59 PM.  If you have any questions, ask your nurse or doctor.               These medicines have changed or have updated prescriptions.        Dose/Directions    furosemide 40 MG tablet   Commonly known as:  LASIX   This may have changed:  when to take this   Used for:  Acute systolic congestive heart failure (H), Chronic systolic heart failure (H)   Changed by:  Lorena Watkins MD        Dose:  40 mg   Take 1 tablet (40 mg) by mouth 2 times daily   Quantity:  180 tablet   Refills:  3       lisinopril 10 MG tablet   Commonly known as:  PRINIVIL/ZESTRIL   This may have changed:    - how much to take  - how to take this  - when to take this  - additional instructions   Used for:  Chronic systolic heart failure (H), Acute systolic congestive heart failure (H)   Changed by:  Lorena Watkins MD        Take 10 mg in AM, 15 mg (1 1/2 pill) in PM.  In 2 weeks increase to 15 mg in AM, 20 mg in PM   Quantity:  120 tablet   Refills:  3            Where to get your medicines      These medications were sent to [facility]/pharmacy #[id number] - [address] [address]     Phone:  [phone]     furosemide 40 MG tablet    lisinopril 10 MG tablet                Primary Care Provider Office Phone # Fax #    Bashir Aritaadrian [phone]       [address] [address]        Equal Access to Services     ALEX RODGERS AH: Eddie Tsai, daphne pena, qapattie martinez " aaron learyayden langstonaan ah. So [facility] [phone].    ATENCIÓN: Si bret luna, tiene a stokes disposición servicios gratuitos de asistencia lingüística. Kam al [phone].    We comply with applicable federal civil rights laws and [place] laws. We do not discriminate on the basis of race, color, national origin, age, disability, sex, sexual orientation, or gender identity.            Thank you!     Thank you for choosing [facility]  for your care. Our goal is always to provide you with excellent care. Hearing back from our patients is one way we can continue to improve our services. Please take a few minutes to complete the written survey that you may receive in the mail after your visit with us. Thank you!             Your Updated Medication List - Protect others around you: Learn how to safely use, store and throw away your medicines at www.disposemymeds.org.          This list is accurate as of 2/22/18 11:59 PM.  Always use your most recent med list.                   Brand Name Dispense Instructions for use Diagnosis    aspirin 81 MG EC tablet     30 tablet    Take 1 tablet (81 mg) by mouth daily    Acute systolic congestive heart failure (H)       atorvastatin 10 MG tablet    LIPITOR    90 tablet    TAKE 1 TABLET BY MOUTH EVERY DAY    Acute systolic congestive heart failure (H)       digoxin 250 MCG tablet    LANOXIN    30 tablet    TAKE 1 TABLET BY MOUTH EVERY DAY    Acute on chronic systolic heart failure (H)       furosemide 40 MG tablet    LASIX    180 tablet    Take 1 tablet (40 mg) by mouth 2 times daily    Acute systolic congestive heart failure (H), Chronic systolic heart failure (H)       hydrALAZINE 50 MG tablet    APRESOLINE    30 tablet    Take 1 tablet (50 mg) by mouth 3 times daily    Nonischemic cardiomyopathy (H)       lisinopril 10 MG tablet    PRINIVIL/ZESTRIL    120 tablet    Take 10 mg in AM, 15 mg (1 1/2 pill) in PM.  In 2 weeks increase to 15 mg in  AM, 20 mg in PM    Chronic systolic heart failure (H), Acute systolic congestive heart failure (H)       METFORMIN HCL PO      Take 1,000 mg by mouth 2 times daily    Chronic systolic heart failure (H), Acute systolic congestive heart failure (H)       metoprolol succinate 25 MG 24 hr tablet    TOPROL-XL    20 tablet    Take 2 tablets (50 mg) by mouth daily    Acute on chronic systolic heart failure (H)       sildenafil 50 MG tablet    VIAGRA    20 tablet    Take 1 tablet (50 mg) by mouth daily as needed    Drug-induced impotence       spironolactone 25 MG tablet    ALDACTONE    30 tablet    Take 1 tablet (25 mg) by mouth daily    Acute on chronic systolic heart failure (H)       traZODone 100 MG tablet    DESYREL    90 tablet    Take 1 tablet (100 mg) by mouth nightly as needed for sleep    Insomnia, unspecified type

## 2018-02-22 NOTE — PROGRESS NOTES
2018      Bashir Hines MD   [facility]   [address] [place]  [address]       RE: Danielito Chu   MRN: [id number]   : 1955      Dear Dr. Hines:      I had the pleasure of seeing Mr. Danielito Chu in the [facility].    As you know, he is a very pleasant 62-year-old man with a history of nonischemic cardiomyopathy with an ejection fraction at its dong of 15% who was admitted in 2017 to the [facility] for decompensated heart failure.  Due to refractory volume overload and difficulty diuresing, he was transferred down for consideration of advanced therapies.  Fortunately, he was able to be diuresed over 40 pounds and was placed on medical therapy which he has continued to tolerate.  He has since been discharged home.  He is on excellent doses of neurohormonal blockade without dizziness or lightheadedness.  He can walk 4 miles at a time without stopping.      He continues to do well. He was recently diagnosed with diabetes mellitus and has been making changes to his diet and is improving with the addition of the metformin. He has also been exercising and dieting which have helped with his weight loss. He has has been having increased stress due to his work situation. He denies PND or orthopnea.  He has not been readmitted.  He denies lower extremity edema, increased abdominal girth.      PAST MEDICAL HISTORY:  Past Medical History:   Diagnosis Date     Acute systolic congestive heart failure (H) 2017     HTN (hypertension)      Hyperlipidemia      Mitral regurgitation      Nonischemic cardiomyopathy (H) 2017     SHIRLEY (obstructive sleep apnea)      Pacemaker 2017    ICD 17       FAMILY HISTORY:  Family History   Problem Relation Age of Onset     Heart Failure Father       at age 86     Heart Failure Paternal Uncle       at 66      Myocardial Infarction Paternal Uncle       at age 62      Coronary Artery Disease Mother       during CABG at age 41       SOCIAL HISTORY:  Social History     Social History     Marital status: Single     Spouse name: N/A     Number of children: N/A     Years of education: N/A     Social History Main Topics     Smoking status: Former Smoker     Smokeless tobacco: None     Alcohol use None     Drug use: None     Sexual activity: Not Asked       CURRENT MEDICATIONS:  Current Outpatient Prescriptions   Medication Sig Dispense Refill     METFORMIN HCL PO Take 1,000 mg by mouth 2 times daily       furosemide (LASIX) 40 MG tablet Take 1 tablet (40 mg) by mouth daily 10 tablet 1     hydrALAZINE (APRESOLINE) 50 MG tablet Take 1 tablet (50 mg) by mouth 3 times daily 30 tablet 1     metoprolol (TOPROL-XL) 25 MG 24 hr tablet Take 2 tablets (50 mg) by mouth daily 20 tablet 1     lisinopril (PRINIVIL/ZESTRIL) 10 MG tablet Take 1 tablet (10 mg) by mouth 2 times daily 20 tablet 1     digoxin (LANOXIN) 250 MCG tablet TAKE 1 TABLET BY MOUTH EVERY DAY 30 tablet 1     atorvastatin (LIPITOR) 10 MG tablet TAKE 1 TABLET BY MOUTH EVERY DAY 90 tablet 3     aspirin EC 81 MG EC tablet Take 1 tablet (81 mg) by mouth daily 30 tablet 3     spironolactone (ALDACTONE) 25 MG tablet Take 1 tablet (25 mg) by mouth daily 30 tablet 3     sildenafil (VIAGRA) 50 MG tablet Take 1 tablet (50 mg) by mouth daily as needed (Patient not taking: Reported on 2018) 20 tablet 1     traZODone (DESYREL) 100 MG tablet Take 1 tablet (100 mg) by mouth nightly as needed for sleep (Patient not taking: Reported on 10/19/2017) 90 tablet 1       ROS:   Constitutional: No fever, chills, or sweats. Weight is stable   ENT: No visual disturbance, ear ache, epistaxis, sore throat.   Allergies/Immunologic: Negative.   Respiratory: No cough, hemoptysis.   Cardiovascular: As per HPI.   GI: No nausea, vomiting, hematemesis, melena, or hematochezia.   : No urinary frequency, dysuria, or hematuria.  + erectile dysfunction  "  Integument: Negative.   Psychiatric: Negative.   Neuro: Negative.   Endocrinology: Negative.   Musculoskeletal: Negative.    EXAM:  /74 (BP Location: Left arm, Patient Position: Chair, Cuff Size: Adult Large)  Pulse 94  Ht 1.753 m (5' 9\")  Wt 98.9 kg (218 lb)  SpO2 95%  BMI 32.19 kg/m2  General: appears comfortable, alert and articulate  Head: normocephalic, atraumatic  Eyes: anicteric sclera, EOMI  Neck: no adenopathy  Orophyarynx: moist mucosa, no lesions, dentition intact  Heart: PMI diffuse,  regular, S1/S2, no murmur, gallop, rub, estimated JVP >10 cm   Lungs: clear, no rales or wheezing  Abdomen: soft, non-tender, bowel sounds present, no hepatosplenomegaly; increased abd fullness  Extremities: no clubbing, cyanosis or edema  Neurological: normal speech and affect, no gross motor deficits    Labs:  CBC RESULTS:  Lab Results   Component Value Date    WBC 8.9 04/08/2017    RBC 4.67 04/08/2017    HGB 15.3 04/08/2017    HCT 46.8 04/08/2017     04/08/2017    MCH 32.8 04/08/2017    MCHC 32.7 04/08/2017    RDW 14.8 04/08/2017     04/08/2017       CMP RESULTS:  Lab Results   Component Value Date     02/22/2018    POTASSIUM 3.9 02/22/2018    CHLORIDE 103 02/22/2018    CO2 28 02/22/2018    ANIONGAP 7 02/22/2018     (H) 02/22/2018    BUN 15 02/22/2018    CR 1.07 02/22/2018    GFRESTIMATED 70 02/22/2018    GFRESTBLACK 85 02/22/2018    ASHLEE 8.7 02/22/2018    BILITOTAL 0.8 02/22/2018    ALBUMIN 3.8 02/22/2018    ALKPHOS 67 02/22/2018    ALT 69 02/22/2018    AST 41 02/22/2018        INR RESULTS:  Lab Results   Component Value Date    INR 1.20 (H) 03/29/2017       Lab Results   Component Value Date    MAG 2.2 04/08/2017     Lab Results   Component Value Date    NTBNPI 2597 (H) 03/29/2017     Lab Results   Component Value Date    NTBNP 3296 (H) 02/22/2018              Repeat echocardiogram from June 2017- LV ejection fraction 30%, normal IVC, normal PA pressures, normal valve function no " paracardial effusion.     Coronary angiogram and cardiac catheterization on 04/03/2017:  Right dominant left main, no angiographic evidence of disease.  LAD type-3 vessel with septal perforators and 4 diagonal vessels without angiographic disease.  RCA no angiographically significant disease.  RA 12, RV 63/15, PA 63/35, mean of 48, wedge 25, Timmy cardiac index 1.2, Timmy cardiac output 1.6, PA saturation 59%.  Pulmonary vascular resistance 6.6.      Cardiopulmonary stress test performed in June 2017 shows a peak VO2 of 20 mL/kg/m2.     Assessment and Plan:   In summary, this is a very pleasant, 62-year-old man with a history of nonischemic cardiomyopathy who was transferred from [place] for consideration of advanced therapies.  He was decompensated at that time but actually responded very well to medical therapy, has tolerated afterload reduction increases as well as he was able to be diuresed quite easily.      He is slightly hypervolemic today. We will increase his furosemide to 40mg twice a day, We will also increase his lisinopril to help with afterload reduction - 10mg qAM and 15mg qPM. Repeat labs in two weeks and if these are consistent with his previous values and he is without symptoms of lightheadedness, we fredis increase to 15mg qAM and 20mg qPM. We had previously thought about Entresto, but this was not initiated due to recent changes in medications with his diabetes. We will revisit this possibility in the future if his EF remains reduced.    Other-   2. Sudden cardiac death prevention.  He has a single-lead ICD.   3. Primary prevention.  He remains on a statin.   4. Obesity.  He is continuing to exercise and do cardiac rehab.   5. Diabetes Mellitus.  Continue on metformin along with diet and exercise.    Seen and discussed with Dr. Watkins.    Kunal Sampson MD  Advanced Heart Failure/Heart Transplant Fellow  [facility] Division of Cardiology   [phone]    I have seen and examined the patient  with the house staff on Feb 22, 2018  and agree with the outlined assessment and plan.      Lorena Watkins MD   of Medicine   [facility] Division of Cardiology         CC  Patient Care Team:  Bashir Hines as PCP - General (Family Practice)  June, Lorena Lindsey MD as MD (Cardiology)  Sparkle Joel, RN as Nurse Coordinator (Cardiology)

## 2018-02-22 NOTE — PROGRESS NOTES
Preliminary Device Interrogation Results.  Final physician signed paceart report to be scanned and attached.    Patient seen in clinic for evaluation and iterative programming of his [employer] single lead ICD per MD orders.  Patient has an appointment to see Dr. Watkins today.  Normal ICD function.  3 NSVT episodes recorded - 175-190, 4-5 sec.  Intrinsic rhythm = VS @ 101 bpm.   = <1%.  Estimated battery longevity to [place] = 12 years.  Patient reports that he is feeling well and denies complaints.  Plan for patient to send a remote transmission in 3 months and return to clinic in 6 months.  Dr. Watkins notified of interrogation results.    Single lead ICD

## 2018-02-22 NOTE — PATIENT INSTRUCTIONS
It was a pleasure to see you in clinic today.  Please do not hesitate to call with any questions or concerns.  You are scheduled for a remote transmission on 5/29/18.  We look forward to seeing you in clinic at your next device check in 6 months.    Eugenia Marlow, RN, MS, CCRN  Electrophysiology Nurse Clinician  [facility]    During Business Hours Please Call:  [phone]  After Hours Please Call:  [phone] - select option #4 and ask for job code [id number]

## 2018-02-22 NOTE — LETTER
2018      RE: Danielito Chu  [address] [facility] [address]       Dear Colleague,    Thank you for the opportunity to participate in the care of your patient, Danielito Chu, at the [facility] HEART [facility]. Please see a copy of my visit note below.    2018      Bashir Hines MD   [facility]   [address] [place]  [address]       RE: Danielito Chu   MRN: [id number]   : 1955      Dear Dr. Hines:      I had the pleasure of seeing Mr. Danielito Chu in the [facility].    As you know, he is a very pleasant 62-year-old man with a history of nonischemic cardiomyopathy with an ejection fraction at its dong of 15% who was admitted in  to the [facility] for decompensated heart failure.  Due to refractory volume overload and difficulty diuresing, he was transferred down for consideration of advanced therapies.  Fortunately, he was able to be diuresed over 40 pounds and was placed on medical therapy which he has continued to tolerate.  He has since been discharged home.  He is on excellent doses of neurohormonal blockade without dizziness or lightheadedness.  He can walk 4 miles at a time without stopping.      He continues to do well. He was recently diagnosed with diabetes mellitus and has been making changes to his diet and is improving with the addition of the metformin. He has also been exercising and dieting which have helped with his weight loss. He has has been having increased stress due to his work situation. He denies PND or orthopnea.  He has not been readmitted.  He denies lower extremity edema, increased abdominal girth.      PAST MEDICAL HISTORY:  Past Medical History:   Diagnosis Date     Acute systolic congestive heart failure (H) 2017     HTN (hypertension)      Hyperlipidemia      Mitral regurgitation      Nonischemic  cardiomyopathy (H) 2017     SHIRLEY (obstructive sleep apnea)      Pacemaker 2017    ICD 17       FAMILY HISTORY:  Family History   Problem Relation Age of Onset     Heart Failure Father       at age 86     Heart Failure Paternal Uncle       at 66      Myocardial Infarction Paternal Uncle       at age 62     Coronary Artery Disease Mother       during CABG at age 41       SOCIAL HISTORY:  Social History     Social History     Marital status: Single     Spouse name: N/A     Number of children: N/A     Years of education: N/A     Social History Main Topics     Smoking status: Former Smoker     Smokeless tobacco: None     Alcohol use None     Drug use: None     Sexual activity: Not Asked       CURRENT MEDICATIONS:  Current Outpatient Prescriptions   Medication Sig Dispense Refill     METFORMIN HCL PO Take 1,000 mg by mouth 2 times daily       furosemide (LASIX) 40 MG tablet Take 1 tablet (40 mg) by mouth daily 10 tablet 1     hydrALAZINE (APRESOLINE) 50 MG tablet Take 1 tablet (50 mg) by mouth 3 times daily 30 tablet 1     metoprolol (TOPROL-XL) 25 MG 24 hr tablet Take 2 tablets (50 mg) by mouth daily 20 tablet 1     lisinopril (PRINIVIL/ZESTRIL) 10 MG tablet Take 1 tablet (10 mg) by mouth 2 times daily 20 tablet 1     digoxin (LANOXIN) 250 MCG tablet TAKE 1 TABLET BY MOUTH EVERY DAY 30 tablet 1     atorvastatin (LIPITOR) 10 MG tablet TAKE 1 TABLET BY MOUTH EVERY DAY 90 tablet 3     aspirin EC 81 MG EC tablet Take 1 tablet (81 mg) by mouth daily 30 tablet 3     spironolactone (ALDACTONE) 25 MG tablet Take 1 tablet (25 mg) by mouth daily 30 tablet 3     sildenafil (VIAGRA) 50 MG tablet Take 1 tablet (50 mg) by mouth daily as needed (Patient not taking: Reported on 2018) 20 tablet 1     traZODone (DESYREL) 100 MG tablet Take 1 tablet (100 mg) by mouth nightly as needed for sleep (Patient not taking: Reported on 10/19/2017) 90 tablet 1       ROS:   Constitutional: No fever, chills, or  "sweats. Weight is stable   ENT: No visual disturbance, ear ache, epistaxis, sore throat.   Allergies/Immunologic: Negative.   Respiratory: No cough, hemoptysis.   Cardiovascular: As per HPI.   GI: No nausea, vomiting, hematemesis, melena, or hematochezia.   : No urinary frequency, dysuria, or hematuria.  + erectile dysfunction   Integument: Negative.   Psychiatric: Negative.   Neuro: Negative.   Endocrinology: Negative.   Musculoskeletal: Negative.    EXAM:  /74 (BP Location: Left arm, Patient Position: Chair, Cuff Size: Adult Large)  Pulse 94  Ht 1.753 m (5' 9\")  Wt 98.9 kg (218 lb)  SpO2 95%  BMI 32.19 kg/m2  General: appears comfortable, alert and articulate  Head: normocephalic, atraumatic  Eyes: anicteric sclera, EOMI  Neck: no adenopathy  Orophyarynx: moist mucosa, no lesions, dentition intact  Heart: PMI diffuse,  regular, S1/S2, no murmur, gallop, rub, estimated JVP >10 cm   Lungs: clear, no rales or wheezing  Abdomen: soft, non-tender, bowel sounds present, no hepatosplenomegaly; increased abd fullness  Extremities: no clubbing, cyanosis or edema  Neurological: normal speech and affect, no gross motor deficits    Labs:  CBC RESULTS:  Lab Results   Component Value Date    WBC 8.9 04/08/2017    RBC 4.67 04/08/2017    HGB 15.3 04/08/2017    HCT 46.8 04/08/2017     04/08/2017    MCH 32.8 04/08/2017    MCHC 32.7 04/08/2017    RDW 14.8 04/08/2017     04/08/2017       CMP RESULTS:  Lab Results   Component Value Date     02/22/2018    POTASSIUM 3.9 02/22/2018    CHLORIDE 103 02/22/2018    CO2 28 02/22/2018    ANIONGAP 7 02/22/2018     (H) 02/22/2018    BUN 15 02/22/2018    CR 1.07 02/22/2018    GFRESTIMATED 70 02/22/2018    GFRESTBLACK 85 02/22/2018    ASHLEE 8.7 02/22/2018    BILITOTAL 0.8 02/22/2018    ALBUMIN 3.8 02/22/2018    ALKPHOS 67 02/22/2018    ALT 69 02/22/2018    AST 41 02/22/2018        INR RESULTS:  Lab Results   Component Value Date    INR 1.20 (H) 03/29/2017 "       Lab Results   Component Value Date    MAG 2.2 04/08/2017     Lab Results   Component Value Date    NTBNPI 2597 (H) 03/29/2017     Lab Results   Component Value Date    NTBNP 3296 (H) 02/22/2018              Repeat echocardiogram from June 2017- LV ejection fraction 30%, normal IVC, normal PA pressures, normal valve function no paracardial effusion.     Coronary angiogram and cardiac catheterization on 04/03/2017:  Right dominant left main, no angiographic evidence of disease.  LAD type-3 vessel with septal perforators and 4 diagonal vessels without angiographic disease.  RCA no angiographically significant disease.  RA 12, RV 63/15, PA 63/35, mean of 48, wedge 25, Timmy cardiac index 1.2, Timmy cardiac output 1.6, PA saturation 59%.  Pulmonary vascular resistance 6.6.      Cardiopulmonary stress test performed in June 2017 shows a peak VO2 of 20 mL/kg/m2.     Assessment and Plan:   In summary, this is a very pleasant, 62-year-old man with a history of nonischemic cardiomyopathy who was transferred from [place] for consideration of advanced therapies.  He was decompensated at that time but actually responded very well to medical therapy, has tolerated afterload reduction increases as well as he was able to be diuresed quite easily.      He is slightly hypervolemic today. We will increase his furosemide to 40mg twice a day, We will also increase his lisinopril to help with afterload reduction - 10mg qAM and 15mg qPM. Repeat labs in two weeks and if these are consistent with his previous values and he is without symptoms of lightheadedness, we fredis increase to 15mg qAM and 20mg qPM. We had previously thought about Entresto, but this was not initiated due to recent changes in medications with his diabetes. We will revisit this possibility in the future if his EF remains reduced.    Other-   2. Sudden cardiac death prevention.  He has a single-lead ICD.   3. Primary prevention.  He remains on a statin.   4. Obesity.  He  is continuing to exercise and do cardiac rehab.   5. Diabetes Mellitus.  Continue on metformin along with diet and exercise.    Seen and discussed with Dr. Watkins.    Kunal Sampson MD  Advanced Heart Failure/Heart Transplant Fellow  [facility] Division of Cardiology   [phone]    CC  Patient Care Team:  Bashir Hines as PCP - General (Family Practice)    Sparkle Joel RN as Nurse Coordinator (Cardiology)

## 2018-02-22 NOTE — MR AVS SNAPSHOT
After Visit Summary   2/22/2018    Danielito Chu    MRN: [id number]           Patient Information     Date Of Birth          1955        Visit Information        Provider Department      2/22/2018 3:00 PM 1, Uc Cv Device [facility]        Today's Diagnoses     Nonischemic cardiomyopathy (H)    -  1      Care Instructions    It was a pleasure to see you in clinic today.  Please do not hesitate to call with any questions or concerns.  You are scheduled for a remote transmission on 5/29/18.  We look forward to seeing you in clinic at your next device check in 6 months.    Eugenia Marlow, RN, MS, CCRN  Electrophysiology Nurse Clinician  [facility]    During Business Hours Please Call:  [phone]  After Hours Please Call:  [phone] - select option #4 and ask for job code [phone]            Follow-ups after your visit        Additional Services     Follow-Up with Device Clinic-6 months                 Your next 10 appointments already scheduled     May 02, 2018 12:00 PM CDT   Lab with  LAB    [facility] ([facility])    [address] [place] [address]  1st Floor  [facility] [address] [phone]            May 02, 2018 12:30 PM CDT   (Arrive by 12:15 PM)   CORE RETURN with [facility]SE [facility] ([facility])    [address] [place] [address]  [address] [place] [id number] [phone]            Jul 19, 2018  2:00 PM CDT   Lab with UC LAB    [facility] ([facility])    [address] [place] [address]  1st Floor  [facility] [address] [phone]            Jul 19, 2018  2:30 PM CDT   (Arrive by 2:15 PM)   Implanted Defibulator with Uc Cv Device 1   [facility] ([facility])    [address] [place] [address]  [address]  [facility] [address] [phone]            Jul 19, 2018  3:00 PM CDT   (Arrive by 2:45 PM)   RETURN HEART FAILURE with  "Lorena Watkins MD   [facility] ([facility])    [address] [facility]  [address] [place] [address] [phone]              Future tests that were ordered for you today     Open Future Orders        Priority Expected Expires Ordered    Follow-Up with Advanced Heart Failure Cardiologist Routine 7/9/2018 9/30/2018 2/22/2018    Follow-Up with CORE Clinic Routine 5/1/2018 6/30/2018 2/22/2018    Basic metabolic panel Routine 3/7/2018 2/22/2019 2/22/2018    Follow-Up with Device Clinic-6 months Routine 8/21/2018 11/19/2018 2/22/2018            Who to contact     If you have questions or need follow up information about today's clinic visit or your schedule please contact [facility] directly at [phone].  Normal or non-critical lab and imaging results will be communicated to you by MyChart, letter or phone within 4 business days after the clinic has received the results. If you do not hear from us within 7 days, please contact the clinic through MyChart or phone. If you have a critical or abnormal lab result, we will notify you by phone as soon as possible.  Submit refill requests through [employer] or call your pharmacy and they will forward the refill request to us. Please allow 3 business days for your refill to be completed.          Additional Information About Your Visit        MyChart Information     [employer] lets you send messages to your doctor, view your test results, renew your prescriptions, schedule appointments and more. To sign up, go to [email or web address] . Click on \"Log in\" on the left side of the screen, which will take you to the Welcome page. Then click on \"Sign up Now\" on the right side of the page.     You will be asked to enter the access code listed below, as well as some personal information. Please follow the directions to create your username and password.     Your access code is: [email or web address]  Expires: 5/9/2018  6:30 AM     Your " access code will  in 90 days. If you need help or a new code, please call your [facility] clinic or [phone].        Care EveryWhere ID     This is your Care EveryWhere ID. This could be used by other organizations to access your [facility] medical records  [id number]         Blood Pressure from Last 3 Encounters:   18 102/74   10/19/17 106/57   06/15/17 96/59    Weight from Last 3 Encounters:   18 98.9 kg (218 lb)   10/19/17 107.3 kg (236 lb 9.6 oz)   06/15/17 104.1 kg (229 lb 6.4 oz)              We Performed the Following     ICD DEVICE PROGRAMMING EVAL, SINGLE LEAD ICD          Today's Medication Changes          These changes are accurate as of 18  5:25 PM.  If you have any questions, ask your nurse or doctor.               These medicines have changed or have updated prescriptions.        Dose/Directions    furosemide 40 MG tablet   Commonly known as:  LASIX   This may have changed:  when to take this   Used for:  Acute systolic congestive heart failure (H), Chronic systolic heart failure (H)   Changed by:  Lorena Watkins MD        Dose:  40 mg   Take 1 tablet (40 mg) by mouth 2 times daily   Quantity:  180 tablet   Refills:  3       lisinopril 10 MG tablet   Commonly known as:  PRINIVIL/ZESTRIL   This may have changed:    - how much to take  - how to take this  - when to take this  - additional instructions   Used for:  Chronic systolic heart failure (H), Acute systolic congestive heart failure (H)   Changed by:  Lorena Watkins MD        Take 10 mg in AM, 15 mg (1 1/2 pill) in PM.  In 2 weeks increase to 15 mg in AM, 20 mg in PM   Quantity:  120 tablet   Refills:  3            Where to get your medicines      These medications were sent to [facility]/pharmacy #[id number] - [address] [address]     Phone:  [phone]     furosemide 40 MG tablet    lisinopril 10 MG tablet                Primary Care Provider  Office Phone # Fax #    Bashir Hines [phone]       [place] [address]        Equal Access to Services     ALEX RODGERS : Eddie Tsai, wabonda pedro pabloadaha, qaybta kaalmada gibran, waxrey cesarin hayaanuvia reeserupagold pearson. So [facility] [phone].    ATENCIÓN: Si habla español, tiene a stokes disposición servicios gratuitos de asistencia lingüística. Llame al [phone].    We comply with applicable federal civil rights laws and [place] laws. We do not discriminate on the basis of race, color, national origin, age, disability, sex, sexual orientation, or gender identity.            Thank you!     Thank you for choosing [facility]  for your care. Our goal is always to provide you with excellent care. Hearing back from our patients is one way we can continue to improve our services. Please take a few minutes to complete the written survey that you may receive in the mail after your visit with us. Thank you!             Your Updated Medication List - Protect others around you: Learn how to safely use, store and throw away your medicines at www.disposemymeds.org.          This list is accurate as of 2/22/18  5:25 PM.  Always use your most recent med list.                   Brand Name Dispense Instructions for use Diagnosis    aspirin 81 MG EC tablet     30 tablet    Take 1 tablet (81 mg) by mouth daily    Acute systolic congestive heart failure (H)       atorvastatin 10 MG tablet    LIPITOR    90 tablet    TAKE 1 TABLET BY MOUTH EVERY DAY    Acute systolic congestive heart failure (H)       digoxin 250 MCG tablet    LANOXIN    30 tablet    TAKE 1 TABLET BY MOUTH EVERY DAY    Acute on chronic systolic heart failure (H)       furosemide 40 MG tablet    LASIX    180 tablet    Take 1 tablet (40 mg) by mouth 2 times daily    Acute systolic congestive heart failure (H), Chronic systolic heart failure (H)       hydrALAZINE 50 MG tablet    APRESOLINE     30 tablet    Take 1 tablet (50 mg) by mouth 3 times daily    Nonischemic cardiomyopathy (H)       lisinopril 10 MG tablet    PRINIVIL/ZESTRIL    120 tablet    Take 10 mg in AM, 15 mg (1 1/2 pill) in PM.  In 2 weeks increase to 15 mg in AM, 20 mg in PM    Chronic systolic heart failure (H), Acute systolic congestive heart failure (H)       METFORMIN HCL PO      Take 1,000 mg by mouth 2 times daily    Chronic systolic heart failure (H), Acute systolic congestive heart failure (H)       metoprolol succinate 25 MG 24 hr tablet    TOPROL-XL    20 tablet    Take 2 tablets (50 mg) by mouth daily    Acute on chronic systolic heart failure (H)       sildenafil 50 MG tablet    VIAGRA    20 tablet    Take 1 tablet (50 mg) by mouth daily as needed    Drug-induced impotence       spironolactone 25 MG tablet    ALDACTONE    30 tablet    Take 1 tablet (25 mg) by mouth daily    Acute on chronic systolic heart failure (H)       traZODone 100 MG tablet    DESYREL    90 tablet    Take 1 tablet (100 mg) by mouth nightly as needed for sleep    Insomnia, unspecified type

## 2018-02-22 NOTE — PATIENT INSTRUCTIONS
Increase lasix 40 mg twice daily  Increase Lisinopril 10 mg in AM, 15 mg in PM  Have labs in 2 weeks  In 2-3 weeks if you feel okay will increase Lisinopril to 15 mg in AM, 20 mg PM    Follow up with CORE in 3 mos.  Follow up with Dr. Watkins in 4-5 months    Joana Trevino, RN  Cardiology Care Coordinator  Please be aware that I work part-time but I will be checking messages several times per week.   For urgent needs, please call the number below.    [phone], press 1 for [employer], press 3 to speak to a nurse    .

## 2018-02-22 NOTE — NURSING NOTE
Chief Complaint   Patient presents with     Follow Up For     NICM      Vitals were taken and medications were reconciled    Charlette Jiménez RMRONALD  3:42 PM

## 2018-02-23 NOTE — NURSING NOTE
Labs: Patient was given results of the laboratory testing obtained today. Patient was instructed to return for the next laboratory testing in 2 weeks . Patient demonstrated understanding of this information and agreed to call with further questions or concerns.   Med Reconcile: Reviewed and verified all current medications with the patient. The updated medication list was printed and given to the patient.  Return Appointment: Patient given instructions regarding scheduling next clinic visit. Patient demonstrated understanding of this information and agreed to call with further questions or concerns.  Medication Change: Patient was educated regarding prescribed medication change, including discussion of the indication, administration, side effects, and when to report to MD or RN. Patient demonstrated understanding of this information and agreed to call with further questions or concerns.  Patient stated he understood all health information given and agreed to call with further questions or concerns.

## 2018-03-13 LAB
ANION GAP SERPL CALCULATED.3IONS-SCNC: ABNORMAL MMOL/L
BUN SERPL-MCNC: 17 MG/DL
CALCIUM SERPL-MCNC: 8.9 MG/DL
CHLORIDE SERPLBLD-SCNC: 100 MMOL/L
CO2 SERPL-SCNC: 26 MMOL/L
CREAT SERPL-MCNC: 1.04 MG/DL
GFR SERPL CREATININE-BSD FRML MDRD: 89 ML/MIN/1.73M2
GLUCOSE SERPL-MCNC: 118 MG/DL (ref 70–99)
POTASSIUM SERPL-SCNC: 4.1 MMOL/L
SODIUM SERPL-SCNC: 135 MMOL/L

## 2018-03-19 ENCOUNTER — CARE COORDINATION (OUTPATIENT)
Dept: CARDIOLOGY | Facility: CLINIC | Age: 63
End: 2018-03-19

## 2018-03-19 NOTE — PROGRESS NOTES
Patient called back to confirm that he faxed labs to [facility] and request a call back after being read. Message forwarded to RNCC.

## 2018-03-19 NOTE — PROGRESS NOTES
Results fax'd copy of lab  Results obtained locally on 3/13.  Labs normal.  Instructed patient to increase lasix tomorrow to 80 mg bid and the following day 80/40.  He will call me on Thursday to let us know if he feels it is working to get more fluid off.  He will be returning home sooner than April 1st so we may get labs at that time once he returns home.

## 2018-03-19 NOTE — PROGRESS NOTES
"Received message below from triage this morning:    Patients sister called stating she is concerned about her brother.  He is retaining fluid and is very lethargic.  She also said he is very out of breath and is unable to exercise.  She stated that he is ill and asked her to come stay with him to take care of him.  He would like you to call him, but I listed her number as well!              I called and spoke with patient.  He had been seen by Dr. Watkins in clinic a few weeks ago and at that time his lasix was doubled from 40 day to 40 bid as he was volume up.  He doesn't feel it is working as he now feels bloated, his pants don't fit around the waist, he feels lethargic. He is  Unable to weigh himself as he is staying in a hotel in [place] for work and is \"on the road\".  He thinks he last weighed at home on or about 2/25 and his wt then was 215. Fabiano feels he has been sodium and fluid compliant to the point he is barely drinking anything. However, as I pointed out, when traveling, he is eating every meal out and there is no reliable way to estimate out how much sodium he is taking in.  He has been traveling for 2 weeks and will be there another 2 weeks, until April 1st. He reports his diabetes is under control and for the last 30 days has been averaging 106.      Fabiano had labs done locally prior to leaving [facility] about 2 weeks ago that are reflective of the lasix increase.  I will track on those labs to see if we can increase diuretics further.    1:30 pm - Patient reported he had the labs drawn at [facility] in [place], [place].  I called and was not able to get labs as it appears they were confused about the fact the pt had labs drawn there.  Called pt.  Requested he call or go to the clinic and request to have the labs sent to us a [phone] or get a copy and call me and we will discuss result.    "

## 2018-03-20 ENCOUNTER — DOCUMENTATION ONLY (OUTPATIENT)
Dept: CARDIOLOGY | Facility: CLINIC | Age: 63
End: 2018-03-20

## 2018-03-22 ENCOUNTER — CARE COORDINATION (OUTPATIENT)
Dept: CARDIOLOGY | Facility: CLINIC | Age: 63
End: 2018-03-22

## 2018-03-22 NOTE — PROGRESS NOTES
"Received a call from Danielito-  He states that he took the extra dose of Lasix as discussed with Sadie. He has received no relief from the extra dose.  He states that he is still carrying fluid in his abdomen and is a little short of breath.  He has been working in [place] and hasn't been eating well.  He is currently on his way home to \"get this under control\".  He also states that he isn't able to sleep at night.  He isn't having to sit up or prop him self up with pillows to help breathe like he has in the past.  He is wondering if he needs to come in to be seen or what other changes can be made to his medications.  Message routed to RNCC for further review to discuss with Dr Watkins.  Danielito denies any further questions or concerns at this time.    Sharif Soliz, RN  RN Care Coordinator  [facility]  [phone]        "

## 2018-03-22 NOTE — PROGRESS NOTES
"Fabiano called in this am per our request to report the recent increase in diuretics did not help his symptoms of feeling bloated, fluid retention. He stated he was on his way back home to [place] from working the last month in [place].  Reviewed with Dr. Watkins.     Plan:  Increase lasix to 80 mg bid for 3 days, then decrease to 60 mg (1.5 tabs) bid            Get labs (BMP) early next week on Monday or at latest Tuesday and have them fax'd to us.    I attempted to call Fabiano to discuss these changes, but his phone is \"unable to accept more messages\" as the mailbox is full.  If he calls into triage, please inform patient of the above changes.  "

## 2018-03-23 ENCOUNTER — CARE COORDINATION (OUTPATIENT)
Dept: CARDIOLOGY | Facility: CLINIC | Age: 63
End: 2018-03-23

## 2018-03-23 DIAGNOSIS — I50.21 ACUTE SYSTOLIC CONGESTIVE HEART FAILURE (H): Primary | ICD-10-CM

## 2018-03-23 DIAGNOSIS — I42.8 NONISCHEMIC CARDIOMYOPATHY (H): ICD-10-CM

## 2018-03-23 RX ORDER — TORSEMIDE 20 MG/1
60 TABLET ORAL 2 TIMES DAILY
Qty: 180 TABLET | Refills: 1 | Status: SHIPPED | OUTPATIENT
Start: 2018-03-23 | End: 2018-03-26

## 2018-03-23 RX ORDER — METOLAZONE 2.5 MG/1
TABLET ORAL
Qty: 5 TABLET | Refills: 0 | Status: SHIPPED | OUTPATIENT
Start: 2018-03-23 | End: 2018-03-29

## 2018-03-23 RX ORDER — POTASSIUM CHLORIDE 1500 MG/1
40 TABLET, EXTENDED RELEASE ORAL DAILY
Qty: 60 TABLET | Refills: 1 | Status: SHIPPED | OUTPATIENT
Start: 2018-03-23 | End: 2018-03-26

## 2018-03-23 NOTE — PROGRESS NOTES
"Received message below from triage today:    \"Patient wanted to check in with you before the weekend.  He said he is feeling terrible so is not working today and would like a call from you.  He does not use voicemail.  He said he took 80 mg Lasix today and has not been able to urinate much.  He said he weight 226lbs and has gained 6 pounds in the last two days. He said it is all in his chest cavity and he is a little SOB when he lays down. He has been traveling for work and thinks he might have ate too much salt.  He said he just feels very off and does no tthink the lasix is working.\"     Called Fabiano.  He is back in [place] and his wt is up to 226 (was not able to weigh for the last month when he was worikng out of town).  Weight on our scales in clinic on 2/22 was 218 which is an 8 lb wt gain over the last several weeks.  He took one dose of the 80 mg this morning as message states.  He feels the fluid is mostly in his abdomen, feels shortness of breath is mild, but can feel it in his chest.  He is scared going to decompensate further and won't know what to do over the weekend. I will review with June today for further recomendations.    3/23 - 4:30 pm  Reviewed with Dr. Watkins.    Plan:    Stop lasix, start torsemide 60 mg bid through weekend then reduce to 40 mg bid  Take metolazone 2.5 mg x one dose on Saturday am  Start Kdur 40 meq daily  Labs on Monday    I will call Fabiano on Sunday am to check his progress on getting rid of the extra fluid/weight    "

## 2018-03-25 DIAGNOSIS — I50.21 ACUTE SYSTOLIC CONGESTIVE HEART FAILURE (H): Primary | ICD-10-CM

## 2018-03-26 ENCOUNTER — CARE COORDINATION (OUTPATIENT)
Dept: CARDIOLOGY | Facility: CLINIC | Age: 63
End: 2018-03-26

## 2018-03-26 DIAGNOSIS — I50.21 ACUTE SYSTOLIC CONGESTIVE HEART FAILURE (H): ICD-10-CM

## 2018-03-26 DIAGNOSIS — I42.8 NONISCHEMIC CARDIOMYOPATHY (H): ICD-10-CM

## 2018-03-26 RX ORDER — POTASSIUM CHLORIDE 1500 MG/1
20 TABLET, EXTENDED RELEASE ORAL DAILY
Qty: 60 TABLET | Refills: 1 | COMMUNITY
Start: 2018-03-26 | End: 2018-03-29

## 2018-03-26 RX ORDER — TORSEMIDE 20 MG/1
60 TABLET ORAL DAILY
Qty: 180 TABLET | Refills: 1 | COMMUNITY
Start: 2018-03-26 | End: 2018-03-29

## 2018-03-26 NOTE — PROGRESS NOTES
Called Fabiano yesterday to check on his weigh and fluid loss.  He started the torsemide on Friday pm and over the next day lost several pounds.  He only took the torsemide once/day and did not need to take the metolazone as he lost 11 lbs over 48 hours.  He then cut the potassium to 20 meq daily also.  Currently he is taking:  Torsemide 60 mg daily  Kdur 20 meq    He was scheduled to get labs drawn at his local clinic this morning and he will stay on the current doses until I review he labs.

## 2018-03-27 ENCOUNTER — CARE COORDINATION (OUTPATIENT)
Dept: CARDIOLOGY | Facility: CLINIC | Age: 63
End: 2018-03-27

## 2018-03-27 LAB
ANION GAP SERPL CALCULATED.3IONS-SCNC: 18 MMOL/L
BUN SERPL-MCNC: 24 MG/DL
CALCIUM SERPL-MCNC: 9.4 MG/DL
CHLORIDE SERPLBLD-SCNC: 95 MMOL/L
CO2 SERPL-SCNC: 30 MMOL/L
CREAT SERPL-MCNC: 1.2 MG/DL
GFR SERPL CREATININE-BSD FRML MDRD: 62 ML/MIN/1.73M2
GLUCOSE SERPL-MCNC: 133 MG/DL (ref 70–99)
POTASSIUM SERPL-SCNC: 4.4 MMOL/L
SODIUM SERPL-SCNC: 139 MMOL/L

## 2018-03-27 NOTE — PROGRESS NOTES
Patient calls in wondering if he is all set to get his lab work done locally. He states he has not gotten any labs done. Faxed lab orders to [facility] at [phone]. He will call this morning to make an appointment.     He wants to speak with Dr. Watkins's RNCC regarding oxygen needs. After he sits down to rest, he would start to feel slightly SOB and check his pulse ox. This shows 82% after resting (with sitting upright). He was on home oxygen in the past and is wondering if he needs it again.    Will route to Dr. Watkins's RNCC to contact patient.

## 2018-03-27 NOTE — PROGRESS NOTES
"Received message below from triage this am:      Patient calls in wondering if he is all set to get his lab work done locally. He states he has not gotten any labs done. Faxed lab orders to [facility] at [phone]. He will call this morning to make an appointment. He wants to speak with Dr. Watkins's RNCC regarding oxygen needs. After he sits down to rest, he would start to feel slightly SOB and check his pulse ox. This shows 82% after resting (with sitting upright). He was on home oxygen in the past and is wondering if he needs it again.       Called and spoke with patient.  He reports he had a sleep study in the past and O2 was ordered for nocturnal hypoxemia.  He sent it back when he no onger felt he needed it.  The O2 sat he checked last pm was from an episode where he was asleep on the couch and suddenly awoke and felt \"different\".  He check his sat with finger oximeter and it was 82%. Once he was fully awake it came back up. He wonders if he can just get O2 to keep at home, but since it was some time since he had it, he may need an updated overnite oximetry test for documentation. He is on his way to have labs drawn and I will call him back later today with results.  Will need to review with Dr. Watkins and will wait until labs are back.    3:30 - Called and reviewed lab results with ivonne.  They look good, creatinine 1.20 and GFR >60, which is unchanged from previously.  His wt today is 216 and he is taking torsemide 60 mg daily. We will check into ordering overnight oximetry to document t that he desats during sleep and see if we can get him O2 at bedtime.  In addition, will set him up for f/u in [facility].  "

## 2018-03-28 ENCOUNTER — CARE COORDINATION (OUTPATIENT)
Dept: CARDIOLOGY | Facility: CLINIC | Age: 63
End: 2018-03-28

## 2018-03-28 NOTE — PROGRESS NOTES
Patient calling wanting to let Sadie ZIMMER RN know that he is faxing in his disability paperwork.  Patient was given clinic fax number.  Patient asked that Sadie give him a call if possible to discuss the paperwork.  I will forward to Sadie for review.  Patient states understanding.

## 2018-03-29 ENCOUNTER — PRE VISIT (OUTPATIENT)
Dept: CARDIOLOGY | Facility: CLINIC | Age: 63
End: 2018-03-29

## 2018-03-29 ENCOUNTER — CARE COORDINATION (OUTPATIENT)
Dept: CARDIOLOGY | Facility: CLINIC | Age: 63
End: 2018-03-29

## 2018-03-29 ENCOUNTER — OFFICE VISIT (OUTPATIENT)
Dept: CARDIOLOGY | Facility: CLINIC | Age: 63
End: 2018-03-29
Attending: INTERNAL MEDICINE
Payer: COMMERCIAL

## 2018-03-29 VITALS
OXYGEN SATURATION: 97 % | HEIGHT: 69 IN | TEMPERATURE: 97.5 F | BODY MASS INDEX: 32.63 KG/M2 | WEIGHT: 220.3 LBS | HEART RATE: 95 BPM | DIASTOLIC BLOOD PRESSURE: 65 MMHG | SYSTOLIC BLOOD PRESSURE: 98 MMHG

## 2018-03-29 DIAGNOSIS — I50.22 CHRONIC SYSTOLIC HEART FAILURE (H): Primary | ICD-10-CM

## 2018-03-29 DIAGNOSIS — I42.8 NONISCHEMIC CARDIOMYOPATHY (H): ICD-10-CM

## 2018-03-29 DIAGNOSIS — G47.34 NOCTURNAL OXYGEN DESATURATION: ICD-10-CM

## 2018-03-29 DIAGNOSIS — I50.21 ACUTE SYSTOLIC CONGESTIVE HEART FAILURE (H): ICD-10-CM

## 2018-03-29 DIAGNOSIS — I50.21 ACUTE SYSTOLIC CONGESTIVE HEART FAILURE (H): Primary | ICD-10-CM

## 2018-03-29 DIAGNOSIS — I50.23 ACUTE ON CHRONIC SYSTOLIC HEART FAILURE (H): ICD-10-CM

## 2018-03-29 LAB
ALBUMIN SERPL-MCNC: 3.5 G/DL (ref 3.4–5)
ALP SERPL-CCNC: 136 U/L (ref 40–150)
ALT SERPL W P-5'-P-CCNC: 29 U/L (ref 0–70)
ANION GAP SERPL CALCULATED.3IONS-SCNC: 8 MMOL/L (ref 3–14)
AST SERPL W P-5'-P-CCNC: 26 U/L (ref 0–45)
BILIRUB SERPL-MCNC: 1.3 MG/DL (ref 0.2–1.3)
BUN SERPL-MCNC: 30 MG/DL (ref 7–30)
CALCIUM SERPL-MCNC: 9 MG/DL (ref 8.5–10.1)
CHLORIDE SERPL-SCNC: 95 MMOL/L (ref 94–109)
CO2 SERPL-SCNC: 30 MMOL/L (ref 20–32)
CREAT SERPL-MCNC: 1.28 MG/DL (ref 0.66–1.25)
ERYTHROCYTE [DISTWIDTH] IN BLOOD BY AUTOMATED COUNT: 14.6 % (ref 10–15)
GFR SERPL CREATININE-BSD FRML MDRD: 57 ML/MIN/1.7M2
GLUCOSE SERPL-MCNC: 131 MG/DL (ref 70–99)
HCT VFR BLD AUTO: 48.3 % (ref 40–53)
HGB BLD-MCNC: 15.2 G/DL (ref 13.3–17.7)
MAGNESIUM SERPL-MCNC: 2.4 MG/DL (ref 1.6–2.3)
MCH RBC QN AUTO: 31.3 PG (ref 26.5–33)
MCHC RBC AUTO-ENTMCNC: 31.5 G/DL (ref 31.5–36.5)
MCV RBC AUTO: 99 FL (ref 78–100)
NT-PROBNP SERPL-MCNC: 3688 PG/ML (ref 0–125)
PLATELET # BLD AUTO: 264 10E9/L (ref 150–450)
POTASSIUM SERPL-SCNC: 4 MMOL/L (ref 3.4–5.3)
PROT SERPL-MCNC: 7.8 G/DL (ref 6.8–8.8)
RBC # BLD AUTO: 4.86 10E12/L (ref 4.4–5.9)
SODIUM SERPL-SCNC: 133 MMOL/L (ref 133–144)
WBC # BLD AUTO: 9.6 10E9/L (ref 4–11)

## 2018-03-29 PROCEDURE — 80053 COMPREHEN METABOLIC PANEL: CPT | Performed by: INTERNAL MEDICINE

## 2018-03-29 PROCEDURE — 83880 ASSAY OF NATRIURETIC PEPTIDE: CPT | Performed by: INTERNAL MEDICINE

## 2018-03-29 PROCEDURE — 83735 ASSAY OF MAGNESIUM: CPT | Performed by: INTERNAL MEDICINE

## 2018-03-29 PROCEDURE — 36415 COLL VENOUS BLD VENIPUNCTURE: CPT | Performed by: INTERNAL MEDICINE

## 2018-03-29 PROCEDURE — 85027 COMPLETE CBC AUTOMATED: CPT | Performed by: INTERNAL MEDICINE

## 2018-03-29 PROCEDURE — G0463 HOSPITAL OUTPT CLINIC VISIT: HCPCS | Mod: ZF

## 2018-03-29 PROCEDURE — 99215 OFFICE O/P EST HI 40 MIN: CPT | Performed by: INTERNAL MEDICINE

## 2018-03-29 RX ORDER — METOLAZONE 2.5 MG/1
TABLET ORAL
Qty: 5 TABLET | Refills: 0 | COMMUNITY
Start: 2018-03-29 | End: 2018-04-04

## 2018-03-29 RX ORDER — METOPROLOL SUCCINATE 25 MG/1
25 TABLET, EXTENDED RELEASE ORAL DAILY
Qty: 90 TABLET | Refills: 3 | Status: SHIPPED | OUTPATIENT
Start: 2018-03-29 | End: 2018-03-31

## 2018-03-29 RX ORDER — POTASSIUM CHLORIDE 1500 MG/1
20 TABLET, EXTENDED RELEASE ORAL 2 TIMES DAILY
Qty: 60 TABLET | Refills: 1 | COMMUNITY
Start: 2018-03-29 | End: 2018-04-12

## 2018-03-29 RX ORDER — TORSEMIDE 20 MG/1
40 TABLET ORAL 2 TIMES DAILY
Qty: 180 TABLET | Refills: 1 | COMMUNITY
Start: 2018-03-29 | End: 2018-04-04

## 2018-03-29 ASSESSMENT — PAIN SCALES - GENERAL: PAINLEVEL: NO PAIN (0)

## 2018-03-29 NOTE — NURSING NOTE
Chief Complaint   Patient presents with     Follow Up For     f/u for exacerbation heart failure     Vitals were taken and medications were reconciled.  Ha Cazares, RMA  3:49 PM

## 2018-03-29 NOTE — LETTER
3/29/2018       RE: Danielito Chu  [address] [facility] [address]       Dear Colleague,    Thank you for the opportunity to participate in the care of your patient, Danielito Chu, at the [facility]. Please see a copy of my visit note below.    2018    Bashir Hines MD   [facility]   [address] [place]  [address]       RE: Danielito Chu   MRN: [id number]   : 1955      Dear Dr. Hines:      I had the pleasure of seeing Mr. Danielito Chu in the [facility].    As you know, he is a very pleasant 62-year-old man with a history of nonischemic cardiomyopathy with an ejection fraction at its dong of 15% who was admitted in  to the [facility] for decompensated heart failure.  Due to refractory volume overload and difficulty diuresing, he was transferred down for consideration of advanced therapies.  Fortunately, he was able to be diuresed over 40 pounds and was placed on medical therapy which he has continued to tolerate.       He did incredibly well all summer long and was back to work full-time-however in the last month he put on 15-20 pounds of fluid, he endorses PND and orthopnea, he has extreme fatigue and is unable to sleep, he has been short of breath walking short distances. He denies syncope or palpitations.  He had a difficult time working.  He was but it was slightly volume overloaded in February when I saw him and we increase his diuretics.  Unfortunately he went to [place] after this and his diet became worse.  He contacted our clinic last week and we escalated his outpatient diuretic substantially and he has lost over 10 pounds.  He is feeling slightly better although nowhere near yet back to his baseline.     He continues to do well. He was recently diagnosed with diabetes mellitus and has been making changes to his diet and is  improving with the addition of the metformin.       PAST MEDICAL HISTORY:  Past Medical History:   Diagnosis Date     Acute systolic congestive heart failure (H) 2017     HTN (hypertension)      Hyperlipidemia      Mitral regurgitation      Nocturnal oxygen desaturation 3/29/2018     Nonischemic cardiomyopathy (H) 2017     SHIRLEY (obstructive sleep apnea)      Pacemaker 2017    ICD 17       FAMILY HISTORY:  Family History   Problem Relation Age of Onset     Heart Failure Father       at age 86     Heart Failure Paternal Uncle       at 66      Myocardial Infarction Paternal Uncle       at age 62     Coronary Artery Disease Mother       during CABG at age 41       SOCIAL HISTORY:  Social History     Social History     Marital status: Single     Spouse name: N/A     Number of children: N/A     Years of education: N/A     Social History Main Topics     Smoking status: Former Smoker     Smokeless tobacco: None     Alcohol use None     Drug use: None     Sexual activity: Not Asked       CURRENT MEDICATIONS:  Current Outpatient Prescriptions   Medication Sig Dispense Refill     torsemide (DEMADEX) 20 MG tablet Take 3 tablets (60 mg) by mouth daily 180 tablet 1     potassium chloride SA (K-DUR/KLOR-CON M) 20 MEQ CR tablet Take 1 tablet (20 mEq) by mouth daily 60 tablet 1     metolazone (ZAROXOLYN) 2.5 MG tablet Take 1 tab 30 min before am torsemide ONLY when directed by 's office 5 tablet 0     METFORMIN HCL PO Take 1,000 mg by mouth 2 times daily       lisinopril (PRINIVIL/ZESTRIL) 10 MG tablet Take 10 mg in AM, 15 mg (1 1/2 pill) in PM.  In 2 weeks increase to 15 mg in AM, 20 mg in  tablet 3     hydrALAZINE (APRESOLINE) 50 MG tablet Take 1 tablet (50 mg) by mouth 3 times daily 30 tablet 1     metoprolol (TOPROL-XL) 25 MG 24 hr tablet Take 2 tablets (50 mg) by mouth daily 20 tablet 1     sildenafil (VIAGRA) 50 MG tablet Take 1 tablet (50 mg) by mouth daily as needed 20 tablet 1      "digoxin (LANOXIN) 250 MCG tablet TAKE 1 TABLET BY MOUTH EVERY DAY 30 tablet 1     atorvastatin (LIPITOR) 10 MG tablet TAKE 1 TABLET BY MOUTH EVERY DAY 90 tablet 3     aspirin EC 81 MG EC tablet Take 1 tablet (81 mg) by mouth daily 30 tablet 3     spironolactone (ALDACTONE) 25 MG tablet Take 1 tablet (25 mg) by mouth daily 30 tablet 3     traZODone (DESYREL) 100 MG tablet Take 1 tablet (100 mg) by mouth nightly as needed for sleep 90 tablet 1       ROS:   Constitutional: No fever, chills, or sweats.  Weight was up 20 pounds and is now down 10 from its peak  ENT: No visual disturbance, ear ache, epistaxis, sore throat.   Allergies/Immunologic: Negative.   Respiratory: No cough, hemoptysis.   Cardiovascular: As per HPI.   GI: No nausea, vomiting, hematemesis, melena, or hematochezia.   : No urinary frequency, dysuria, or hematuria.  + erectile dysfunction   Integument: Negative.   Psychiatric: Feeling frustrated with a setback  Neuro: Negative.   Endocrinology: Negative.   Musculoskeletal: Negative.    EXAM:  BP 98/65 (BP Location: Right arm, Patient Position: Chair, Cuff Size: Adult Large)  Pulse 95  Temp 97.5  F (36.4  C)  Ht 1.753 m (5' 9\")  Wt 99.9 kg (220 lb 4.8 oz)  SpO2 97%  BMI 32.53 kg/m2  General: appears comfortable, alert and articulate  Head: normocephalic, atraumatic  Eyes: anicteric sclera, EOMI  Neck: no adenopathy  Orophyarynx: moist mucosa, no lesions, dentition intact  Heart: PMI diffuse,  regular, S1/S2, no murmur, gallop, rub, estimated JVP to the angle of the jaw, positive hepatojugular reflux  Lungs: clear, no rales or wheezing  Abdomen: soft, non-tender, bowel sounds present, no hepatosplenomegaly; increased abd fullness  Extremities: no clubbing, cyanosis, 1+ lower extremity edema to the knees bilaterally  Neurological: normal speech and affect, no gross motor deficits    Labs:  CBC RESULTS:  Lab Results   Component Value Date    WBC 9.6 03/29/2018    RBC 4.86 03/29/2018    HGB 15.2 " 03/29/2018    HCT 48.3 03/29/2018    MCV 99 03/29/2018    MCH 31.3 03/29/2018    MCHC 31.5 03/29/2018    RDW 14.6 03/29/2018     03/29/2018       CMP RESULTS:  Lab Results   Component Value Date     03/29/2018    POTASSIUM 4.0 03/29/2018    CHLORIDE 95 03/29/2018    CO2 30 03/29/2018    ANIONGAP 8 03/29/2018     (H) 03/29/2018    BUN 30 03/29/2018    CR 1.28 (H) 03/29/2018    GFRESTIMATED 57 (L) 03/29/2018    GFRESTBLACK 69 03/29/2018    ASHLEE 9.0 03/29/2018    BILITOTAL 1.3 03/29/2018    ALBUMIN 3.5 03/29/2018    ALKPHOS 136 03/29/2018    ALT 29 03/29/2018    AST 26 03/29/2018        INR RESULTS:  Lab Results   Component Value Date    INR 1.20 (H) 03/29/2017       Lab Results   Component Value Date    MAG 2.4 (H) 03/29/2018     Lab Results   Component Value Date    NTBNPI 2597 (H) 03/29/2017     Lab Results   Component Value Date    NTBNP 3688 (H) 03/29/2018              Repeat echocardiogram from June 2017- LV ejection fraction 30%, normal IVC, normal PA pressures, normal valve function no paracardial effusion.     Coronary angiogram and cardiac catheterization on 04/03/2017:( in the setting of decompensation) right dominant left main, no angiographic evidence of disease.  LAD type-3 vessel with septal perforators and 4 diagonal vessels without angiographic disease.  RCA no angiographically significant disease.  RA 12, RV 63/15, PA 63/35, mean of 48, wedge 25, Timmy cardiac index 1.2, Timmy cardiac output 1.6, PA saturation 59%.  Pulmonary vascular resistance 6.6.      Cardiopulmonary stress test performed in June 2017 shows a peak VO2 of 20 mL/kg/m2.     Assessment and Plan:   In summary, this is a very pleasant, 63-year-old man with a history of nonischemic cardiomyopathy who was transferred from [place] as an inpatient in 2017 for consideration of advanced therapies.  He did well with medical therapy for almost a year but unfortunately now is having another decompensation.     I am increasing his  torsemide to 40 mg twice daily, potassium 20 mEq  twice daily, and I am reducing his beta-blocker by half(Toprol-XL 25 mg daily).  Given low blood pressures and keeping his afterload reduction at its current dose.  I am having him see [facility] on Monday as he is just on the brink of needing to be readmitted.  We are filing paperwork for cardioMEMs I believe this will help manage his fluid substantially.  He does meet criteria for this as he is class IIIb symptoms and was hospitalized within the last year with ongoing fluid overload despite medical therapy.     Once we have him out of this acute decompensation-we will think about transitioning to Entresto which has always been on our list-it is been hard to manage him from the [place] area and he prefers to have all of his cardiology care here.     With regard to the larger picture for his heart failure-I am hopeful that with further medical optimization we can buy more time before needing to consider advanced therapies but we will need to determine his degree of functional limitation when he is optimized.     Other-   2. Sudden cardiac death prevention.  He has a single-lead ICD.   3. Primary prevention.  He remains on a statin.   4. Obesity. -We will only be able to resume physical activity when he has no longer decompensated  5. Diabetes Mellitus.  Continue on metformin along with diet and exercise.      Lorena Watkins MD   of Medicine   [facility] Division of Cardiology       CC  Patient Care Team:  Bashir Hines as PCP - General (Family Practice)    Sparkle Joel, RN as Nurse Coordinator (Cardiology)

## 2018-03-29 NOTE — PATIENT INSTRUCTIONS
Torsemide 40 mg twice daily  Potassium 20 mEq twice daily  Decrease  Metoprolol XL 25 mg daily  Schedule [facility] on Mon. Tues, next week with Minoo Rodarte Dawn  Do not take any Metolazone. UNTIL WE TELL YOU TAKE TAKE IT.    Joana Trevino, RN  Cardiology Care Coordinator  Please be aware that I work part-time but I will be checking messages several times per week.   For urgent needs, please call the number below.    [phone], press 1 for [employer], press 3 to speak to a nurse    .

## 2018-03-29 NOTE — MR AVS SNAPSHOT
After Visit Summary   3/29/2018    Danielito Chu    MRN: [id number]           Patient Information     Date Of Birth          1955        Visit Information        Provider Department      3/29/2018 5:00 PM Lorena Watkins MD [facility]        Today's Diagnoses     Chronic systolic heart failure (H)    -  1    Acute systolic congestive heart failure (H)        Nonischemic cardiomyopathy (H)        Acute on chronic systolic heart failure (H)          Care Instructions    Torsemide 40 mg twice daily  Potassium 20 mEq twice daily  Decrease  Metoprolol XL 25 mg daily  Schedule CORE on Mon. Tues, next week with Aster, Kylie Hennessy  Do not take any Metolazone. UNTIL WE TELL YOU TAKE TAKE IT.    Joana Trevino, RN  Cardiology Care Coordinator  Please be aware that I work part-time but I will be checking messages several times per week.   For urgent needs, please call the number below.    [phone], press 1 for [employer], press 3 to speak to a nurse    .            Follow-ups after your visit        Additional Services     Follow-Up with CORE Clinic       WITH ASTER                  Your next 10 appointments already scheduled     Apr 03, 2018 10:30 AM CDT   Lab with  LAB   [facility])    [address] [place] [id number] [phone]            Apr 03, 2018 11:00 AM CDT   (Arrive by 10:45 AM)   CORE RETURN with JOSE A Ferrell CNP   [facility] ([facility])    [address] [place] [id number] [phone]            May 02, 2018 12:00 PM CDT   Lab with  LAB   [facility] ([facility])    [address] [place] [id number]   [phone]            May 02, 2018 12:30 PM CDT   (Arrive by 12:15 PM)   CORE RETURN with JOSE A [facility] ([facility])     [address] [facility] [address]  [address]  [facility] [address] [phone]            Jul 19, 2018  2:00 PM CDT   Lab with UC LAB   [facility] ([facility])    [address] [place] [address]  1st Floor  [facility] [address]   [phone]            Jul 19, 2018  2:30 PM CDT   (Arrive by 2:15 PM)   Implanted Defibulator with Uc Cv Device 1   [facility] ([facility])    [address] [place] [address]  [address] [place] [id number] [phone]            Jul 19, 2018  3:00 PM CDT   (Arrive by 2:45 PM)   RETURN HEART FAILURE with Lorena Watkins MD   [facility] ([facility])    [address] [place] [address]  [address] [place] [id number] [phone]              Future tests that were ordered for you today     Open Future Orders        Priority Expected Expires Ordered    Follow-Up with CORE Clinic Routine 4/2/2018 7/4/2018 3/29/2018    Comprehensive metabolic panel Routine 4/2/2018 3/29/2019 3/29/2018    N terminal pro BNP outpatient Routine 4/2/2018 3/29/2019 3/29/2018            Who to contact     If you have questions or need follow up information about today's clinic visit or your schedule please contact [facility] directly at [phone].  Normal or non-critical lab and imaging results will be communicated to you by Galeneahart, letter or phone within 4 business days after the clinic has received the results. If you do not hear from us within 7 days, please contact the clinic through [employer] or phone. If you have a critical or abnormal lab result, we will notify you by phone as soon as possible.  Submit refill requests through [employer] or call your pharmacy and they will forward the refill request to us. Please allow 3 business days for your refill to be completed.          Additional Information About Your Visit        [employer] Information     [employer] lets you send messages to your doctor, view your test results,  "renew your prescriptions, schedule appointments and more. To sign up, go to [email or web address] . Click on \"Log in\" on the left side of the screen, which will take you to the Welcome page. Then click on \"Sign up Now\" on the right side of the page.     You will be asked to enter the access code listed below, as well as some personal information. Please follow the directions to create your username and password.     Your access code is: [email or web address]  Expires: 2018  7:30 AM     Your access code will  in 90 days. If you need help or a new code, please call your [facility] clinic or [phone].        Care EveryWhere ID     This is your Care EveryWhere ID. This could be used by other organizations to access your [facility] medical records  [id number]        Your Vitals Were     Pulse Temperature Height Pulse Oximetry BMI (Body Mass Index)       95 97.5  F (36.4  C) 1.753 m (5' 9\") 97% 32.53 kg/m2        Blood Pressure from Last 3 Encounters:   18 98/65   18 102/74   10/19/17 106/57    Weight from Last 3 Encounters:   18 99.9 kg (220 lb 4.8 oz)   18 98.9 kg (218 lb)   10/19/17 107.3 kg (236 lb 9.6 oz)                 Today's Medication Changes          These changes are accurate as of 3/29/18  6:03 PM.  If you have any questions, ask your nurse or doctor.               These medicines have changed or have updated prescriptions.        Dose/Directions    metolazone 2.5 MG tablet   Commonly known as:  ZAROXOLYN   This may have changed:  additional instructions   Used for:  Acute systolic congestive heart failure (H)   Changed by:  Lorena Watkins MD        Take 1 tab 30 min before am torsemide ONLY TAKE WHEN 's OFFICE   Quantity:  5 tablet   Refills:  0       metoprolol succinate 25 MG 24 hr tablet   Commonly known as:  TOPROL-XL   This may have changed:  how much to take   Used for:  Acute on chronic systolic heart failure (H)   Changed by:  Lorena Watkins MD        " Dose:  25 mg   Take 1 tablet (25 mg) by mouth daily   Quantity:  90 tablet   Refills:  3       potassium chloride SA 20 MEQ CR tablet   Commonly known as:  K-DUR/KLOR-CON M   This may have changed:  when to take this   Used for:  Acute systolic congestive heart failure (H)   Changed by:  Lorena Watkins MD        Dose:  20 mEq   Take 1 tablet (20 mEq) by mouth 2 times daily   Quantity:  60 tablet   Refills:  1       torsemide 20 MG tablet   Commonly known as:  DEMADEX   This may have changed:    - how much to take  - when to take this   Used for:  Acute systolic congestive heart failure (H), Nonischemic cardiomyopathy (H)   Changed by:  Lorena Watkins MD        Dose:  40 mg   Take 2 tablets (40 mg) by mouth 2 times daily   Quantity:  180 tablet   Refills:  1            Where to get your medicines      These medications were sent to [facility]/pharmacy #[id number] - [address] [address]     Phone:  [phone]     metoprolol succinate 25 MG 24 hr tablet                Primary Care Provider Office Phone # Fax #    Bashir Hines [phone]       [address] [address]        Equal Access to Services     ALEX RODGERS AH: Hadii andres gaston hadasho Sojeanieali, waaxda luqadaha, qaybta kaalmada adeegyada, aaron pearson. So [facility] [phone].    ATENCIÓN: Si habla español, tiene a stokes disposición servicios gratuitos de asistencia lingüística. Llame al [phone].    We comply with applicable federal civil rights laws and [place] laws. We do not discriminate on the basis of race, color, national origin, age, disability, sex, sexual orientation, or gender identity.            Thank you!     Thank you for choosing [facility]  for your care. Our goal is always to provide you with excellent care. Hearing back from our patients is one way we can continue to improve our services.  Please take a few minutes to complete the written survey that you may receive in the mail after your visit with us. Thank you!             Your Updated Medication List - Protect others around you: Learn how to safely use, store and throw away your medicines at www.disposemymeds.org.          This list is accurate as of 3/29/18  6:03 PM.  Always use your most recent med list.                   Brand Name Dispense Instructions for use Diagnosis    aspirin 81 MG EC tablet     30 tablet    Take 1 tablet (81 mg) by mouth daily    Acute systolic congestive heart failure (H)       atorvastatin 10 MG tablet    LIPITOR    90 tablet    TAKE 1 TABLET BY MOUTH EVERY DAY    Acute systolic congestive heart failure (H)       digoxin 250 MCG tablet    LANOXIN    30 tablet    TAKE 1 TABLET BY MOUTH EVERY DAY    Acute on chronic systolic heart failure (H)       hydrALAZINE 50 MG tablet    APRESOLINE    30 tablet    Take 1 tablet (50 mg) by mouth 3 times daily    Nonischemic cardiomyopathy (H)       lisinopril 10 MG tablet    PRINIVIL/ZESTRIL    120 tablet    Take 10 mg in AM, 15 mg (1 1/2 pill) in PM.  In 2 weeks increase to 15 mg in AM, 20 mg in PM    Chronic systolic heart failure (H), Acute systolic congestive heart failure (H)       METFORMIN HCL PO      Take 1,000 mg by mouth 2 times daily    Chronic systolic heart failure (H), Acute systolic congestive heart failure (H)       metolazone 2.5 MG tablet    ZAROXOLYN    5 tablet    Take 1 tab 30 min before am torsemide ONLY TAKE WHEN 's OFFICE    Acute systolic congestive heart failure (H)       metoprolol succinate 25 MG 24 hr tablet    TOPROL-XL    90 tablet    Take 1 tablet (25 mg) by mouth daily    Acute on chronic systolic heart failure (H)       potassium chloride SA 20 MEQ CR tablet    K-DUR/KLOR-CON M    60 tablet    Take 1 tablet (20 mEq) by mouth 2 times daily    Acute systolic congestive heart failure (H)       sildenafil 50 MG tablet    VIAGRA    20 tablet    Take 1  tablet (50 mg) by mouth daily as needed    Drug-induced impotence       spironolactone 25 MG tablet    ALDACTONE    30 tablet    Take 1 tablet (25 mg) by mouth daily    Acute on chronic systolic heart failure (H)       torsemide 20 MG tablet    DEMADEX    180 tablet    Take 2 tablets (40 mg) by mouth 2 times daily    Acute systolic congestive heart failure (H), Nonischemic cardiomyopathy (H)       traZODone 100 MG tablet    DESYREL    90 tablet    Take 1 tablet (100 mg) by mouth nightly as needed for sleep    Insomnia, unspecified type

## 2018-03-29 NOTE — PROGRESS NOTES
2018    Bashir Hines MD   [facility]   [address] [place]  [address]       RE: Danielito Chu   MRN: [id number]   : 1955      Dear Dr. Hines:      I had the pleasure of seeing Mr. Danielito Chu in the [facility].    As you know, he is a very pleasant 62-year-old man with a history of nonischemic cardiomyopathy with an ejection fraction at its dong of 15% who was admitted in  to the [facility] for decompensated heart failure.  Due to refractory volume overload and difficulty diuresing, he was transferred down for consideration of advanced therapies.  Fortunately, he was able to be diuresed over 40 pounds and was placed on medical therapy which he has continued to tolerate.       He did incredibly well all summer long and was back to work full-time-however in the last month he put on 15-20 pounds of fluid, he endorses PND and orthopnea, he has extreme fatigue and is unable to sleep, he has been short of breath walking short distances. He denies syncope or palpitations.  He had a difficult time working.  He was but it was slightly volume overloaded in February when I saw him and we increase his diuretics.  Unfortunately he went to [place] after this and his diet became worse.  He contacted our clinic last week and we escalated his outpatient diuretic substantially and he has lost over 10 pounds.  He is feeling slightly better although nowhere near yet back to his baseline.     He continues to do well. He was recently diagnosed with diabetes mellitus and has been making changes to his diet and is improving with the addition of the metformin.       PAST MEDICAL HISTORY:  Past Medical History:   Diagnosis Date     Acute systolic congestive heart failure (H) 2017     HTN (hypertension)      Hyperlipidemia      Mitral regurgitation      Nocturnal oxygen desaturation 3/29/2018     Nonischemic cardiomyopathy (H)  2017     SHIRLEY (obstructive sleep apnea)      Pacemaker 2017    ICD 17       FAMILY HISTORY:  Family History   Problem Relation Age of Onset     Heart Failure Father       at age 86     Heart Failure Paternal Uncle       at 66      Myocardial Infarction Paternal Uncle       at age 62     Coronary Artery Disease Mother       during CABG at age 41       SOCIAL HISTORY:  Social History     Social History     Marital status: Single     Spouse name: N/A     Number of children: N/A     Years of education: N/A     Social History Main Topics     Smoking status: Former Smoker     Smokeless tobacco: None     Alcohol use None     Drug use: None     Sexual activity: Not Asked       CURRENT MEDICATIONS:  Current Outpatient Prescriptions   Medication Sig Dispense Refill     torsemide (DEMADEX) 20 MG tablet Take 3 tablets (60 mg) by mouth daily 180 tablet 1     potassium chloride SA (K-DUR/KLOR-CON M) 20 MEQ CR tablet Take 1 tablet (20 mEq) by mouth daily 60 tablet 1     metolazone (ZAROXOLYN) 2.5 MG tablet Take 1 tab 30 min before am torsemide ONLY when directed by 's office 5 tablet 0     METFORMIN HCL PO Take 1,000 mg by mouth 2 times daily       lisinopril (PRINIVIL/ZESTRIL) 10 MG tablet Take 10 mg in AM, 15 mg (1 1/2 pill) in PM.  In 2 weeks increase to 15 mg in AM, 20 mg in  tablet 3     hydrALAZINE (APRESOLINE) 50 MG tablet Take 1 tablet (50 mg) by mouth 3 times daily 30 tablet 1     metoprolol (TOPROL-XL) 25 MG 24 hr tablet Take 2 tablets (50 mg) by mouth daily 20 tablet 1     sildenafil (VIAGRA) 50 MG tablet Take 1 tablet (50 mg) by mouth daily as needed 20 tablet 1     digoxin (LANOXIN) 250 MCG tablet TAKE 1 TABLET BY MOUTH EVERY DAY 30 tablet 1     atorvastatin (LIPITOR) 10 MG tablet TAKE 1 TABLET BY MOUTH EVERY DAY 90 tablet 3     aspirin EC 81 MG EC tablet Take 1 tablet (81 mg) by mouth daily 30 tablet 3     spironolactone (ALDACTONE) 25 MG tablet Take 1 tablet (25 mg) by mouth  "daily 30 tablet 3     traZODone (DESYREL) 100 MG tablet Take 1 tablet (100 mg) by mouth nightly as needed for sleep 90 tablet 1       ROS:   Constitutional: No fever, chills, or sweats.  Weight was up 20 pounds and is now down 10 from its peak  ENT: No visual disturbance, ear ache, epistaxis, sore throat.   Allergies/Immunologic: Negative.   Respiratory: No cough, hemoptysis.   Cardiovascular: As per HPI.   GI: No nausea, vomiting, hematemesis, melena, or hematochezia.   : No urinary frequency, dysuria, or hematuria.  + erectile dysfunction   Integument: Negative.   Psychiatric: Feeling frustrated with a setback  Neuro: Negative.   Endocrinology: Negative.   Musculoskeletal: Negative.    EXAM:  BP 98/65 (BP Location: Right arm, Patient Position: Chair, Cuff Size: Adult Large)  Pulse 95  Temp 97.5  F (36.4  C)  Ht 1.753 m (5' 9\")  Wt 99.9 kg (220 lb 4.8 oz)  SpO2 97%  BMI 32.53 kg/m2  General: appears comfortable, alert and articulate  Head: normocephalic, atraumatic  Eyes: anicteric sclera, EOMI  Neck: no adenopathy  Orophyarynx: moist mucosa, no lesions, dentition intact  Heart: PMI diffuse,  regular, S1/S2, no murmur, gallop, rub, estimated JVP to the angle of the jaw, positive hepatojugular reflux  Lungs: clear, no rales or wheezing  Abdomen: soft, non-tender, bowel sounds present, no hepatosplenomegaly; increased abd fullness  Extremities: no clubbing, cyanosis, 1+ lower extremity edema to the knees bilaterally  Neurological: normal speech and affect, no gross motor deficits    Labs:  CBC RESULTS:  Lab Results   Component Value Date    WBC 9.6 03/29/2018    RBC 4.86 03/29/2018    HGB 15.2 03/29/2018    HCT 48.3 03/29/2018    MCV 99 03/29/2018    MCH 31.3 03/29/2018    MCHC 31.5 03/29/2018    RDW 14.6 03/29/2018     03/29/2018       CMP RESULTS:  Lab Results   Component Value Date     03/29/2018    POTASSIUM 4.0 03/29/2018    CHLORIDE 95 03/29/2018    CO2 30 03/29/2018    ANIONGAP 8 " 03/29/2018     (H) 03/29/2018    BUN 30 03/29/2018    CR 1.28 (H) 03/29/2018    GFRESTIMATED 57 (L) 03/29/2018    GFRESTBLACK 69 03/29/2018    ASHLEE 9.0 03/29/2018    BILITOTAL 1.3 03/29/2018    ALBUMIN 3.5 03/29/2018    ALKPHOS 136 03/29/2018    ALT 29 03/29/2018    AST 26 03/29/2018        INR RESULTS:  Lab Results   Component Value Date    INR 1.20 (H) 03/29/2017       Lab Results   Component Value Date    MAG 2.4 (H) 03/29/2018     Lab Results   Component Value Date    NTBNPI 2597 (H) 03/29/2017     Lab Results   Component Value Date    NTBNP 3688 (H) 03/29/2018              Repeat echocardiogram from June 2017- LV ejection fraction 30%, normal IVC, normal PA pressures, normal valve function no paracardial effusion.     Coronary angiogram and cardiac catheterization on 04/03/2017:( in the setting of decompensation) right dominant left main, no angiographic evidence of disease.  LAD type-3 vessel with septal perforators and 4 diagonal vessels without angiographic disease.  RCA no angiographically significant disease.  RA 12, RV 63/15, PA 63/35, mean of 48, wedge 25, Timmy cardiac index 1.2, Timmy cardiac output 1.6, PA saturation 59%.  Pulmonary vascular resistance 6.6.      Cardiopulmonary stress test performed in June 2017 shows a peak VO2 of 20 mL/kg/m2.     Assessment and Plan:   In summary, this is a very pleasant, 63-year-old man with a history of nonischemic cardiomyopathy who was transferred from [place] as an inpatient in 2017 for consideration of advanced therapies.  He did well with medical therapy for almost a year but unfortunately now is having another decompensation.     I am increasing his torsemide to 40 mg twice daily, potassium 20 mEq  twice daily, and I am reducing his beta-blocker by half(Toprol-XL 25 mg daily).  Given low blood pressures and keeping his afterload reduction at its current dose.  I am having him see CORE on Monday as he is just on the brink of needing to be readmitted.  We  are filing paperwork for cardioMEMs I believe this will help manage his fluid substantially.  He does meet criteria for this as he is class IIIb symptoms and was hospitalized within the last year with ongoing fluid overload despite medical therapy.     Once we have him out of this acute decompensation-we will think about transitioning to Entresto which has always been on our list-it is been hard to manage him from the [place] area and he prefers to have all of his cardiology care here.     With regard to the larger picture for his heart failure-I am hopeful that with further medical optimization we can buy more time before needing to consider advanced therapies but we will need to determine his degree of functional limitation when he is optimized.     Other-   2. Sudden cardiac death prevention.  He has a single-lead ICD.   3. Primary prevention.  He remains on a statin.   4. Obesity. -We will only be able to resume physical activity when he has no longer decompensated  5. Diabetes Mellitus.  Continue on metformin along with diet and exercise.      Lorena Watkins MD   of Medicine   [facility] Division of Cardiology         CC  Patient Care Team:  Bashir Hines as PCP - General (Family Practice)  Lorena Watkins MD as MD (Cardiology)  Sparkle Joel, RN as Nurse Coordinator (Cardiology)

## 2018-03-29 NOTE — PROGRESS NOTES
Returned call from patient's sister, Justina, who called triage late in the afternoon to report concern regarding patient's symptoms.  She was with patient and calling from his cell phone.  As previous messages state, Fabiano had been working out of town for a month, eating out, and began to experience increased weight gain, estimated at about 10 lbs. We increased his lasix, but he did not feel it was working well and thus last Friday evening, we changed his diuretic.    He was contacted over the weekend and felt much better, wt dropped by 11 lbs and he had not needed to take the whole dose (torsemide 60 mg bid) or the metolazone 2.5 since his wt decreased.  He had labs earlier this week (see lab section) and they were good, creatinine, GFR and potassium all normal). On Tuesday, he mentioned that he awoke from falling asleep on the couch and felt funny and checked his O2 sat which was down in the 80's.  He had oxygen in past for use at HS, but when he was feeling well, stopped using it and sent it back.  He denied low sats when fully awake or ambulating.  We discussed that I didn't think we could just order more O2 since it had been awhile and they would require another overnite oximetry test for documentation,   but I would look into getting another one ordered and equipment sent to him.    When I spoke with Justina yesterday evening, she reported her concern that his sats were low at HS, down as low as 67. He apparently was having difficulty sleeping an d then checked it. She reported that Fabiano is feeling very scared and anxious, couldn't sleep last night so he got up and took torsemide 60 mg at 11 pm, diuresed and felt better.  He did NOT take it all day and then took it at 11 pm.  When asked why, she thought he was unsure about what the new, stronger, diuretic was doing to his body. Of note:  His weight is 215.  When I talked to her at 4:30 - he had again not taken it all day.  On a side note, she mentioned he was  feeling dizzy earlier in the day.    As it is unclear how he is feeling, whether he is now too dry,and he has become very anxious over this, I recommended they come for labs and to see Dr. Watkins in clinic tomorrow.  I will go ahead and send in another order for overnight oximetry to [facility] which is close to his home.

## 2018-03-30 ENCOUNTER — PRE VISIT (OUTPATIENT)
Dept: CARDIOLOGY | Facility: CLINIC | Age: 63
End: 2018-03-30

## 2018-03-30 DIAGNOSIS — I50.22 CHRONIC SYSTOLIC HEART FAILURE (H): Primary | ICD-10-CM

## 2018-03-31 DIAGNOSIS — I50.23 ACUTE ON CHRONIC SYSTOLIC HEART FAILURE (H): ICD-10-CM

## 2018-04-02 ENCOUNTER — CARE COORDINATION (OUTPATIENT)
Dept: CARDIOLOGY | Facility: CLINIC | Age: 63
End: 2018-04-02

## 2018-04-02 RX ORDER — METOPROLOL SUCCINATE 25 MG/1
25 TABLET, EXTENDED RELEASE ORAL DAILY
Qty: 90 TABLET | Refills: 3 | Status: SHIPPED | OUTPATIENT
Start: 2018-04-02 | End: 2018-06-25

## 2018-04-02 NOTE — PATIENT INSTRUCTIONS
Spoke with patient earlier today to follow up on last week visit with Dr. Watkins. He reports he is feeling  better.  He has lost about 5# on increased Torsemide. Less bloating in abdomen. Sleeping with 2 pillows which is normal for him. He has not had any problems feeling SOB during the night as previously reported. Patient was to see CORE tomorrow but due to weather concerns he will now come on Wed. with repeat labs.

## 2018-04-04 ENCOUNTER — OFFICE VISIT (OUTPATIENT)
Dept: CARDIOLOGY | Facility: CLINIC | Age: 63
End: 2018-04-04
Attending: INTERNAL MEDICINE
Payer: COMMERCIAL

## 2018-04-04 VITALS
SYSTOLIC BLOOD PRESSURE: 83 MMHG | DIASTOLIC BLOOD PRESSURE: 55 MMHG | OXYGEN SATURATION: 98 % | HEART RATE: 77 BPM | WEIGHT: 213.3 LBS | HEIGHT: 70 IN | BODY MASS INDEX: 30.54 KG/M2

## 2018-04-04 DIAGNOSIS — I42.8 NONISCHEMIC CARDIOMYOPATHY (H): ICD-10-CM

## 2018-04-04 DIAGNOSIS — E11.9 TYPE 2 DIABETES MELLITUS WITHOUT COMPLICATION, WITHOUT LONG-TERM CURRENT USE OF INSULIN (H): ICD-10-CM

## 2018-04-04 DIAGNOSIS — I50.22 CHRONIC SYSTOLIC HEART FAILURE (H): ICD-10-CM

## 2018-04-04 DIAGNOSIS — I50.21 ACUTE SYSTOLIC CONGESTIVE HEART FAILURE (H): ICD-10-CM

## 2018-04-04 DIAGNOSIS — I50.22 CHRONIC SYSTOLIC HEART FAILURE (H): Primary | ICD-10-CM

## 2018-04-04 LAB
ALBUMIN SERPL-MCNC: 3.8 G/DL (ref 3.4–5)
ALP SERPL-CCNC: 108 U/L (ref 40–150)
ALT SERPL W P-5'-P-CCNC: 24 U/L (ref 0–70)
ANION GAP SERPL CALCULATED.3IONS-SCNC: 6 MMOL/L (ref 3–14)
AST SERPL W P-5'-P-CCNC: 22 U/L (ref 0–45)
BILIRUB SERPL-MCNC: 1.1 MG/DL (ref 0.2–1.3)
BUN SERPL-MCNC: 33 MG/DL (ref 7–30)
CALCIUM SERPL-MCNC: 9.4 MG/DL (ref 8.5–10.1)
CHLORIDE SERPL-SCNC: 97 MMOL/L (ref 94–109)
CO2 SERPL-SCNC: 29 MMOL/L (ref 20–32)
CREAT SERPL-MCNC: 1.49 MG/DL (ref 0.66–1.25)
GFR SERPL CREATININE-BSD FRML MDRD: 48 ML/MIN/1.7M2
GLUCOSE SERPL-MCNC: 173 MG/DL (ref 70–99)
NT-PROBNP SERPL-MCNC: 1671 PG/ML (ref 0–125)
POTASSIUM SERPL-SCNC: 4.8 MMOL/L (ref 3.4–5.3)
PROT SERPL-MCNC: 8.1 G/DL (ref 6.8–8.8)
SODIUM SERPL-SCNC: 133 MMOL/L (ref 133–144)

## 2018-04-04 PROCEDURE — 99214 OFFICE O/P EST MOD 30 MIN: CPT | Mod: ZP | Performed by: NURSE PRACTITIONER

## 2018-04-04 PROCEDURE — G0463 HOSPITAL OUTPT CLINIC VISIT: HCPCS | Mod: ZF

## 2018-04-04 PROCEDURE — 83880 ASSAY OF NATRIURETIC PEPTIDE: CPT | Performed by: INTERNAL MEDICINE

## 2018-04-04 PROCEDURE — 80053 COMPREHEN METABOLIC PANEL: CPT | Performed by: INTERNAL MEDICINE

## 2018-04-04 PROCEDURE — 36415 COLL VENOUS BLD VENIPUNCTURE: CPT | Performed by: INTERNAL MEDICINE

## 2018-04-04 RX ORDER — TORSEMIDE 20 MG/1
20 TABLET ORAL 2 TIMES DAILY
Qty: 180 TABLET | Refills: 1 | Status: SHIPPED | OUTPATIENT
Start: 2018-04-04 | End: 2018-04-12

## 2018-04-04 RX ORDER — LISINOPRIL 10 MG/1
TABLET ORAL
Qty: 120 TABLET | Refills: 3 | Status: ON HOLD | OUTPATIENT
Start: 2018-04-04 | End: 2018-06-11

## 2018-04-04 ASSESSMENT — PAIN SCALES - GENERAL: PAINLEVEL: NO PAIN (0)

## 2018-04-04 NOTE — LETTER
"4/4/2018      RE: Danielito Chu  [id number] [facility] [id number]       Dear Colleague,    Thank you for the opportunity to participate in the care of your patient, Danielito Chu, at the [facility] HEART [facility]. Please see a copy of my visit note below.    HPI:   Mr. Chu is a 62-year-old man with a history of nonischemic cardiomyopathy with an ejection fraction at its dong of 15% who was admitted in 2017 to the [facility] for decompensated heart failure. Due to refractory volume overload and difficulty diuresing, he was transferred for consideration of advanced therapies. Fortunately, he was diuresed over 40 pounds and was placed on medical therapy which he has continued to tolerate. He did well last summer and was back to work full-time-however in the last month he put on 15-20 pounds of fluid, was reporting PND and orthopnea, he has extreme fatigue, and he has been short of breath walking short distances.  diuretics were adjusted. He then went on a trip to [place] and had some dietary indiscretions - so diuretics were further adjusted and he lost 10 lb. He was last seen in clinic 3/29 by Dr. Watkins, at that time he was noted to be hypervolemic so torsemide was increased to 40 mg bid. He presents today for follow-up.     Since last visit, he reports a significant increase in urine output since increasing torsemide. In fact, he skipped his second dose yesterday as he felt he was \"peeing too much\".  His weight is down 7 lb and at its dong in our system. He reports new headaches and some lightheadedness with standing. Does not check BP at home. He continues to endorse shortness of breath with 1/2 block. Overall, admits breathing is getting worse. Denies exertional chest pain or lightheadedness. Continues to endorse orthopnea, no PND. Abdominal bloating is unchanged. He is currently working but has applied for disability.     PAST MEDICAL " HISTORY:  Past Medical History:   Diagnosis Date     Acute systolic congestive heart failure (H) 2017     HTN (hypertension)      Hyperlipidemia      Mitral regurgitation      Nocturnal oxygen desaturation 3/29/2018     Nonischemic cardiomyopathy (H) 2017     SHIRLEY (obstructive sleep apnea)      Pacemaker 2017    ICD 17       FAMILY HISTORY:  Family History   Problem Relation Age of Onset     Heart Failure Father       at age 86     Heart Failure Paternal Uncle       at 66      Myocardial Infarction Paternal Uncle       at age 62     Coronary Artery Disease Mother       during CABG at age 41       SOCIAL HISTORY:  Social History     Social History     Marital status: Single     Spouse name: N/A     Number of children: N/A     Years of education: N/A     Social History Main Topics     Smoking status: Former Smoker     Smokeless tobacco: Never Used     Alcohol use Not on file     Drug use: Not on file     Sexual activity: Not on file     Other Topics Concern     Not on file     Social History Narrative       CURRENT MEDICATIONS:    Current Outpatient Prescriptions on File Prior to Visit:  metoprolol succinate (TOPROL-XL) 25 MG 24 hr tablet Take 1 tablet (25 mg) by mouth daily   torsemide (DEMADEX) 20 MG tablet Take 2 tablets (40 mg) by mouth 2 times daily   potassium chloride SA (K-DUR/KLOR-CON M) 20 MEQ CR tablet Take 1 tablet (20 mEq) by mouth 2 times daily   metolazone (ZAROXOLYN) 2.5 MG tablet Take 1 tab 30 min before am torsemide ONLY TAKE WHEN 's OFFICE   METFORMIN HCL PO Take 1,000 mg by mouth 2 times daily   lisinopril (PRINIVIL/ZESTRIL) 10 MG tablet Take 10 mg in AM, 15 mg (1 1/2 pill) in PM.  In 2 weeks increase to 15 mg in AM, 20 mg in PM   hydrALAZINE (APRESOLINE) 50 MG tablet Take 1 tablet (50 mg) by mouth 3 times daily   sildenafil (VIAGRA) 50 MG tablet Take 1 tablet (50 mg) by mouth daily as needed   digoxin (LANOXIN) 250 MCG tablet TAKE 1 TABLET BY MOUTH EVERY DAY  "  atorvastatin (LIPITOR) 10 MG tablet TAKE 1 TABLET BY MOUTH EVERY DAY   aspirin EC 81 MG EC tablet Take 1 tablet (81 mg) by mouth daily   spironolactone (ALDACTONE) 25 MG tablet Take 1 tablet (25 mg) by mouth daily   traZODone (DESYREL) 100 MG tablet Take 1 tablet (100 mg) by mouth nightly as needed for sleep     No current facility-administered medications on file prior to visit.     ROS:   CONSTITUTIONAL: Denies fever, chills, or weight fluctuations.   HEENT: Denies headache, vision changes, and changes in speech.   CV: Refer to HPI.   PULMONARY:Refer to HPI.   GI:Denies nausea, vomiting, diarrhea, and abdominal pain. Bowel movements are regular.   :Denies urinary alterations, dysuria, urinary frequency, hematuria, and abnormal drainage.   EXT:Denies lower extremity edema.   SKIN:Denies abnormal rashes or lesions.   MUSCULOSKELETAL:Denies upper or lower extremity weakness and pain.   NEUROLOGIC:Denies lightheadedness, dizziness, seizures, or upper or lower extremity paresthesia.     EXAM:  BP (!) 83/55  Pulse 77  Ht 1.765 m (5' 9.5\")  Wt 96.8 kg (213 lb 4.8 oz)  SpO2 98%  BMI 31.05 kg/m2     GENERAL: Appears comfortable, in no acute distress.   HEENT: Eye symmetrical, no discharge or icterus bilaterally. Mucous membranes moist and without lesions.  CV: RRR, +S1S2, no murmur, rub, or gallop. JVP not visible at 60 degrees.   RESPIRATORY: Respirations regular, even, and unlabored. Lungs CTA throughout.   GI: Soft and non distended with normoactive bowel sounds present in all quadrants. No tenderness, rebound, guarding. No hepatomegaly.   EXTREMITIES: No peripheral edema. 2+ bilateral pedal pulses.   NEUROLOGIC: Alert and oriented x 3. No focal deficits.   MUSCULOSKELETAL: No joint swelling or tenderness.   SKIN: No jaundice. No rashes or lesions.     Labs, reviewed with patient in clinic today:  CBC RESULTS:  Lab Results   Component Value Date    WBC 9.6 03/29/2018    RBC 4.86 03/29/2018    HGB 15.2 " 03/29/2018    HCT 48.3 03/29/2018    MCV 99 03/29/2018    MCH 31.3 03/29/2018    MCHC 31.5 03/29/2018    RDW 14.6 03/29/2018     03/29/2018       CMP RESULTS:  Lab Results   Component Value Date     03/29/2018    POTASSIUM 4.0 03/29/2018    CHLORIDE 95 03/29/2018    CO2 30 03/29/2018    ANIONGAP 8 03/29/2018     (H) 03/29/2018    BUN 30 03/29/2018    CR 1.28 (H) 03/29/2018    GFRESTIMATED 57 (L) 03/29/2018    GFRESTBLACK 69 03/29/2018    ASHLEE 9.0 03/29/2018    BILITOTAL 1.3 03/29/2018    ALBUMIN 3.5 03/29/2018    ALKPHOS 136 03/29/2018    ALT 29 03/29/2018    AST 26 03/29/2018        INR RESULTS:  Lab Results   Component Value Date    INR 1.20 (H) 03/29/2017       Lab Results   Component Value Date    MAG 2.4 (H) 03/29/2018     Lab Results   Component Value Date    NTBNPI 2597 (H) 03/29/2017     Lab Results   Component Value Date    NTBNP 3688 (H) 03/29/2018       Diagnostics:  TTE 6/15/17  The LVEF is visually estimated in the 30 % range (calculated, 28 %.) Severe  diffuse hypokinesis with inferior wall akinesis is present.  Mild to moderate right ventricular dilation is present. Global right  ventricular function is normal.  Mild aortic insufficiency is present.  Pulmonary artery systolic pressure is normal.  Dilation of the inferior vena cava is present with normal respiratory  variation in diameter. Estimated mean right atrial pressure is 8 mmHg.  No pericardial effusion is present.  Previous study not available for comparison.    CPX 6/15/17      Cor angio 3/29/17      RHC  4/3/17      Last ICD interrogation: Normal ICD function.  3 NSVT episodes recorded - 175-190, 4-5 sec.  Intrinsic rhythm = VS @ 101 bpm.   = <1%.  Estimated battery longevity to LINCOLN = 12 years.     Assessment and Plan:   Mr. Chu is a 62 year old male with history of nonischemic cardiomyopathy who was transferred from [place] as an inpatient in 2017 for consideration of advanced therapies. He did well with medical therapy  for almost a year but was found to be decompensated in clinic last week so torsemide was increased (and BB decreased).Today he presents hypovolemic (exam confirmed by Dr. Lincoln), mildly hypotensive, NT pro BNP is half of value last week, and Cr up a little likely prerenal. We will decrease torsemide to prior dose.     # Chronic systolic heart failure/HFrEF secondary to NICM    Stage C. NYHA Class IIIB.    Fluid status: hypovolemic, hold torsemide his PM and resume at 20 mg bid tomorrow   ACEi/ARB/ARNI: lisinopril 15 mg in AM and 20 mg in PM, consider switch to Entresto when renal function stable and BP improved  BB: Toprol XL 25 mg daily  Aldosterone antagonist: spironolactone 25 mg daily  Other: digoxin 25 mcg daily, hydralazine 50 mg tid   SCD prophylaxis: ICD  NSAID use: contraindicated  Sleep apnea: has CSA, not using device - APV contraindicated, recommend following up with sleep medicine   Remote monitoring: referral to [facility] started today    # DM  Treated with metformin, he is on asa and statin for primary prevention       Follow up BMP on Friday and in [facility] clinic in one month       25 minutes spent face-to-face with patient, >50% in counseling and/or coordination of care as described above        Aster Purcell DNP, NP-C  4/4/2018          DRISS LINN

## 2018-04-04 NOTE — PROGRESS NOTES
"HPI:   Mr. Chu is a 62-year-old man with a history of nonischemic cardiomyopathy with an ejection fraction at its dong of 15% who was admitted in 2017 to the [facility] for decompensated heart failure. Due to refractory volume overload and difficulty diuresing, he was transferred for consideration of advanced therapies. Fortunately, he was diuresed over 40 pounds and was placed on medical therapy which he has continued to tolerate. He did well last summer and was back to work full-time-however in the last month he put on 15-20 pounds of fluid, was reporting PND and orthopnea, he has extreme fatigue, and he has been short of breath walking short distances. diuretics were adjusted. He then went on a trip to [place] and had some dietary indiscretions - so diuretics were further adjusted and he lost 10 lb. He was last seen in clinic 3/29 by Dr. Watkins, at that time he was noted to be hypervolemic so torsemide was increased to 40 mg bid. He presents today for follow-up.     Since last visit, he reports a significant increase in urine output since increasing torsemide. In fact, he skipped his second dose yesterday as he felt he was \"peeing too much\".  His weight is down 7 lb and at its dong in our system. He reports new headaches and some lightheadedness with standing. Does not check BP at home. He continues to endorse shortness of breath with 1/2 block. Overall, admits breathing is getting worse. Denies exertional chest pain or lightheadedness. Continues to endorse orthopnea, no PND. Abdominal bloating is unchanged. He is currently working but has applied for disability.     PAST MEDICAL HISTORY:  Past Medical History:   Diagnosis Date     Acute systolic congestive heart failure (H) 4/5/2017     HTN (hypertension)      Hyperlipidemia      Mitral regurgitation      Nocturnal oxygen desaturation 3/29/2018     Nonischemic cardiomyopathy (H) 4/5/2017     SHIRLEY (obstructive sleep apnea)      Pacemaker " 2017    ICD 17       FAMILY HISTORY:  Family History   Problem Relation Age of Onset     Heart Failure Father       at age 86     Heart Failure Paternal Uncle       at 66      Myocardial Infarction Paternal Uncle       at age 62     Coronary Artery Disease Mother       during CABG at age 41       SOCIAL HISTORY:  Social History     Social History     Marital status: Single     Spouse name: N/A     Number of children: N/A     Years of education: N/A     Social History Main Topics     Smoking status: Former Smoker     Smokeless tobacco: Never Used     Alcohol use Not on file     Drug use: Not on file     Sexual activity: Not on file     Other Topics Concern     Not on file     Social History Narrative       CURRENT MEDICATIONS:    Current Outpatient Prescriptions on File Prior to Visit:  metoprolol succinate (TOPROL-XL) 25 MG 24 hr tablet Take 1 tablet (25 mg) by mouth daily   torsemide (DEMADEX) 20 MG tablet Take 2 tablets (40 mg) by mouth 2 times daily   potassium chloride SA (K-DUR/KLOR-CON M) 20 MEQ CR tablet Take 1 tablet (20 mEq) by mouth 2 times daily   metolazone (ZAROXOLYN) 2.5 MG tablet Take 1 tab 30 min before am torsemide ONLY TAKE WHEN 's OFFICE   METFORMIN HCL PO Take 1,000 mg by mouth 2 times daily   lisinopril (PRINIVIL/ZESTRIL) 10 MG tablet Take 10 mg in AM, 15 mg (1 1/2 pill) in PM.  In 2 weeks increase to 15 mg in AM, 20 mg in PM   hydrALAZINE (APRESOLINE) 50 MG tablet Take 1 tablet (50 mg) by mouth 3 times daily   sildenafil (VIAGRA) 50 MG tablet Take 1 tablet (50 mg) by mouth daily as needed   digoxin (LANOXIN) 250 MCG tablet TAKE 1 TABLET BY MOUTH EVERY DAY   atorvastatin (LIPITOR) 10 MG tablet TAKE 1 TABLET BY MOUTH EVERY DAY   aspirin EC 81 MG EC tablet Take 1 tablet (81 mg) by mouth daily   spironolactone (ALDACTONE) 25 MG tablet Take 1 tablet (25 mg) by mouth daily   traZODone (DESYREL) 100 MG tablet Take 1 tablet (100 mg) by mouth nightly as needed for sleep  "    No current facility-administered medications on file prior to visit.     ROS:   CONSTITUTIONAL: Denies fever, chills, or weight fluctuations.   HEENT: Denies headache, vision changes, and changes in speech.   CV: Refer to HPI.   PULMONARY:Refer to HPI.   GI:Denies nausea, vomiting, diarrhea, and abdominal pain. Bowel movements are regular.   :Denies urinary alterations, dysuria, urinary frequency, hematuria, and abnormal drainage.   EXT:Denies lower extremity edema.   SKIN:Denies abnormal rashes or lesions.   MUSCULOSKELETAL:Denies upper or lower extremity weakness and pain.   NEUROLOGIC:Denies lightheadedness, dizziness, seizures, or upper or lower extremity paresthesia.     EXAM:  BP (!) 83/55  Pulse 77  Ht 1.765 m (5' 9.5\")  Wt 96.8 kg (213 lb 4.8 oz)  SpO2 98%  BMI 31.05 kg/m2     GENERAL: Appears comfortable, in no acute distress.   HEENT: Eye symmetrical, no discharge or icterus bilaterally. Mucous membranes moist and without lesions.  CV: RRR, +S1S2, no murmur, rub, or gallop. JVP not visible at 60 degrees.   RESPIRATORY: Respirations regular, even, and unlabored. Lungs CTA throughout.   GI: Soft and non distended with normoactive bowel sounds present in all quadrants. No tenderness, rebound, guarding. No hepatomegaly.   EXTREMITIES: No peripheral edema. 2+ bilateral pedal pulses.   NEUROLOGIC: Alert and oriented x 3. No focal deficits.   MUSCULOSKELETAL: No joint swelling or tenderness.   SKIN: No jaundice. No rashes or lesions.     Labs, reviewed with patient in clinic today:  CBC RESULTS:  Lab Results   Component Value Date    WBC 9.6 03/29/2018    RBC 4.86 03/29/2018    HGB 15.2 03/29/2018    HCT 48.3 03/29/2018    MCV 99 03/29/2018    MCH 31.3 03/29/2018    MCHC 31.5 03/29/2018    RDW 14.6 03/29/2018     03/29/2018       CMP RESULTS:  Lab Results   Component Value Date     03/29/2018    POTASSIUM 4.0 03/29/2018    CHLORIDE 95 03/29/2018    CO2 30 03/29/2018    ANIONGAP 8 " 03/29/2018     (H) 03/29/2018    BUN 30 03/29/2018    CR 1.28 (H) 03/29/2018    GFRESTIMATED 57 (L) 03/29/2018    GFRESTBLACK 69 03/29/2018    ASHLEE 9.0 03/29/2018    BILITOTAL 1.3 03/29/2018    ALBUMIN 3.5 03/29/2018    ALKPHOS 136 03/29/2018    ALT 29 03/29/2018    AST 26 03/29/2018        INR RESULTS:  Lab Results   Component Value Date    INR 1.20 (H) 03/29/2017       Lab Results   Component Value Date    MAG 2.4 (H) 03/29/2018     Lab Results   Component Value Date    NTBNPI 2597 (H) 03/29/2017     Lab Results   Component Value Date    NTBNP 3688 (H) 03/29/2018       Diagnostics:  TTE 6/15/17  The LVEF is visually estimated in the 30 % range (calculated, 28 %.) Severe  diffuse hypokinesis with inferior wall akinesis is present.  Mild to moderate right ventricular dilation is present. Global right  ventricular function is normal.  Mild aortic insufficiency is present.  Pulmonary artery systolic pressure is normal.  Dilation of the inferior vena cava is present with normal respiratory  variation in diameter. Estimated mean right atrial pressure is 8 mmHg.  No pericardial effusion is present.  Previous study not available for comparison.    CPX 6/15/17      Cor angio 3/29/17      RHC  4/3/17      Last ICD interrogation: Normal ICD function.  3 NSVT episodes recorded - 175-190, 4-5 sec.  Intrinsic rhythm = VS @ 101 bpm.   = <1%.  Estimated battery longevity to LINCOLN = 12 years.     Assessment and Plan:   Mr. Chu is a 62 year old male with history of nonischemic cardiomyopathy who was transferred from [place] as an inpatient in 2017 for consideration of advanced therapies. He did well with medical therapy for almost a year but was found to be decompensated in clinic last week so torsemide was increased (and BB decreased).Today he presents hypovolemic (exam confirmed by Dr. Lincoln), mildly hypotensive, NT pro BNP is half of value last week, and Cr up a little likely prerenal. We will decrease torsemide to  prior dose.     # Chronic systolic heart failure/HFrEF secondary to NICM    Stage C. NYHA Class IIIB.    Fluid status: hypovolemic, hold torsemide his PM and resume at 20 mg bid tomorrow   ACEi/ARB/ARNI: lisinopril 15 mg in AM and 20 mg in PM, consider switch to Entresto when renal function stable and BP improved  BB: Toprol XL 25 mg daily  Aldosterone antagonist: spironolactone 25 mg daily  Other: digoxin 25 mcg daily, hydralazine 50 mg tid   SCD prophylaxis: ICD  NSAID use: contraindicated  Sleep apnea: has CSA, not using device - APV contraindicated, recommend following up with sleep medicine   Remote monitoring: referral to [facility] started today    # DM  Treated with metformin, he is on asa and statin for primary prevention       Follow up BMP on Friday and in [facility] clinic in one month       25 minutes spent face-to-face with patient, >50% in counseling and/or coordination of care as described above        Aster Purcell DNP, NP-C  4/4/2018          DRISS LINN

## 2018-04-04 NOTE — NURSING NOTE
Chief Complaint   Patient presents with     New Patient     New CORE: 61 yo male. HFrEF. considering for advanced therapies. [place] referral. [facility]? Presenting for f/u and labs.      Vitals were taken and medications were reconciled.     JOSE M Butcher  2:10 PM

## 2018-04-04 NOTE — MR AVS SNAPSHOT
After Visit Summary   4/4/2018    Juan Chu    MRN: [id number]           Patient Information     Date Of Birth          1955        Visit Information        Provider Department      4/4/2018 2:30 PM Kalli Purcell APRN CNP [facility]        Today's Diagnoses     Chronic systolic heart failure (H)    -  1    Type 2 diabetes mellitus without complication, without long-term current use of insulin (H)        Acute systolic congestive heart failure (H)          Care Instructions    You were seen today in the Cardiovascular Clinic at the [facility].     Cardiology Providers you saw during your visit: Aster NARANJO, RUFINO       1. Hold torsemide dose this evening.   2. Metolazone discontinued from your medication list  3. Decrease torsemide to 20 mg twice a day.   4. Please have labs drawn locally on 4/6.   5. Follow up with CORE clinic as scheduled on May 2nd.   6. We will submit your information for the cardiomems procedure and will keep you posted once it is approved.   7. Call with any questions or concerns. [phone] ARAVIND Valencia      Results for JUAN CHU (MRN [id number]) as of 4/4/2018 14:24   Ref. Range 4/4/2018 13:34   Sodium Latest Ref Range: 133 - 144 mmol/L 133   Potassium Latest Ref Range: 3.4 - 5.3 mmol/L 4.8   Chloride Latest Ref Range: 94 - 109 mmol/L 97   Carbon Dioxide Latest Ref Range: 20 - 32 mmol/L 29   Urea Nitrogen Latest Ref Range: 7 - 30 mg/dL 33 (H)   Creatinine Latest Ref Range: 0.66 - 1.25 mg/dL 1.49 (H)   GFR Estimate Latest Ref Range: >60 mL/min/1.7m2 48 (L)   GFR Estimate If Black Latest Ref Range: >60 mL/min/1.7m2 58 (L)   Calcium Latest Ref Range: 8.5 - 10.1 mg/dL 9.4   Anion Gap Latest Ref Range: 3 - 14 mmol/L 6   Albumin Latest Ref Range: 3.4 - 5.0 g/dL 3.8   Protein Total Latest Ref Range: 6.8 - 8.8 g/dL 8.1   Bilirubin Total Latest Ref Range: 0.2 - 1.3 mg/dL 1.1   Alkaline Phosphatase Latest Ref Range: 40 - 150 U/L 108  "  ALT Latest Ref Range: 0 - 70 U/L 24   AST Latest Ref Range: 0 - 45 U/L 22   N-Terminal Pro Bnp Latest Ref Range: 0 - 125 pg/mL 1671 (H)   Glucose Latest Ref Range: 70 - 99 mg/dL 173 (H)           Please limit your fluid intake to 2 L (64 ounces) daily.  2 Liters a day = 8.5 cups, or 72 ounces.  Please limit your salt intake to 2 grams a day or less.    If you gain 2# in 24 hours or 5# in one week call Annie Small RN so we can adjust your medications as needed over the phone.    Please feel free to call me with any questions or concerns.      Annie Small RN BSN   [facility]  Cardiology Care Coordinator-Heart Failure Clinic    Questions and [phone].   First press #1 for the University and then press #3 for \"Medical Questions\" to reach us Cardiology Nurses.     On Call Cardiologist for after hours or on weekends: [phone]   option #4 and ask to speak to the on-call Cardiologist. Inform them you are a CORE/heart failure patient at the [place].        If you need a medication refill please contact your pharmacy.  Please allow 3 business days for your refill to be completed.  _______________________________________________________  C.O.R.E. CLINIC Cardiomyopathy, Optimization, Rehabilitation, Education   The C.O.R.E. CLINIC is a heart failure specialty clinic within the [facility] where you will work with specialized nurse practitioners dedicated to helping patients with heart failure carefully adjust medications, receive education, and learn who and when to call if symptoms develop. They specialize in helping you better understand your condition, slow the progression of your disease, improve the length and quality of your life, help you detect future heart problems before they become life threatening, and avoid hospitalizations.  As always, thank you for trusting us with your health care needs!                Follow-ups after your " visit        Additional Services     NUTRITION REFERRAL       Your provider has referred you to: [facility]    Please be aware that coverage of these services is subject to the terms and limitations of your health insurance plan.  Call member services at your health plan with any benefit or coverage questions.      Please bring the following with you to your appointment:    (1) This referral request  (2) Any documents given to you regarding this referral  (3) Any specific questions you have about diet and/or food choices                  Your next 10 appointments already scheduled     May 02, 2018 12:00 PM CDT   Lab with UC LAB   [facility] ([facility])    [address] [place] [address]  [address] [facility] [address]   [phone]            May 02, 2018 12:30 PM CDT   (Arrive by 12:15 PM)   CORE RETURN with JOSE A Marion CNP   [facility])    [address] [place] [address]  [address] [place] [id number] [phone]            Jul 19, 2018  2:00 PM CDT   Lab with UC LAB   [facility] ([facility])    [address] [place] [id number]   [phone]            Jul 19, 2018  2:30 PM CDT   (Arrive by 2:15 PM)   Implanted Defibulator with  Cv Device 1   [facility] ([facility])    [address] [place] [address]  [address] [place] [id number] [phone]            Jul 19, 2018  3:00 PM CDT   (Arrive by 2:45 PM)   RETURN HEART FAILURE with Lorena Watkins MD   [facility])    [address] [place] [address]  [address] [place] [id number] [phone]              Who to contact     If you have questions or need follow up information about today's clinic visit or your schedule please contact [facility] directly at [phone].  Normal or non-critical lab and imaging results will be  "communicated to you by MyChart, letter or phone within 4 business days after the clinic has received the results. If you do not hear from us within 7 days, please contact the clinic through [facility] [employer] or phone. If you have a critical or abnormal lab result, we will notify you by phone as soon as possible.  Submit refill requests through [facility] [employer] or call your pharmacy and they will forward the refill request to us. Please allow 3 business days for your refill to be completed.          Additional Information About Your Visit        [facility] [employer] Information     [facility] [employer] lets you send messages to your doctor, view your test results, renew your prescriptions, schedule appointments and more. To sign up, go to [email or web address] . Click on \"Log in\" on the left side of the screen, which will take you to the Welcome page. Then click on \"Sign up Now\" on the right side of the page.     You will be asked to enter the access code listed below, as well as some personal information. Please follow the directions to create your username and password.     Your access code is: [email or web address]  Expires: 2018  7:30 AM     Your access code will  in 90 days. If you need help or a new code, please call your [place] clinic or [phone].        Care EveryWhere ID     This is your Care EveryWhere ID. This could be used by other organizations to access your [place] medical records  [id number]        Your Vitals Were     Pulse Height Pulse Oximetry BMI (Body Mass Index)          77 1.765 m (5' 9.5\") 98% 31.05 kg/m2         Blood Pressure from Last 3 Encounters:   18 (!) 83/55   18 98/65   18 102/74    Weight from Last 3 Encounters:   18 96.8 kg (213 lb 4.8 oz)   18 99.9 kg (220 lb 4.8 oz)   18 98.9 kg (218 lb)              We Performed the Following     Follow-Up with CORE Clinic     NUTRITION REFERRAL          Today's Medication Changes          These changes are accurate as of 18  3:11 PM.  If " you have any questions, ask your nurse or doctor.               These medicines have changed or have updated prescriptions.        Dose/Directions    lisinopril 10 MG tablet   Commonly known as:  PRINIVIL/ZESTRIL   This may have changed:  additional instructions   Used for:  Chronic systolic heart failure (H), Acute systolic congestive heart failure (H)   Changed by:  Kalli Purcell APRN CNP        Take 15 mg in AM and 20 mg in PM   Quantity:  120 tablet   Refills:  3         Stop taking these medicines if you haven't already. Please contact your care team if you have questions.     metolazone 2.5 MG tablet   Commonly known as:  ZAROXOLYN   Stopped by:  Kalli Purcell APRN CNP                Where to get your medicines      These medications were sent to [facility]/pharmacy #[id number] [address] [address]     Phone:  [phone]     lisinopril 10 MG tablet                Primary Care Provider Office Phone # Fax #    Bashir Edwardstonyadrian [phone]       [address] [address]        Equal Access to Services     [facility]: Hadii andres ku hadasho Soomaali, waaxda luqadaha, qaybta kaalmada adeaydenyacarlos alberto, aaron pearson. So [facility] [phone].    ATENCIÓN: Si habla español, tiene a stokes disposición servicios gratuitos de asistencia lingüística. Kam al [phone].    We comply with applicable federal civil rights laws and [place] laws. We do not discriminate on the basis of race, color, national origin, age, disability, sex, sexual orientation, or gender identity.            Thank you!     Thank you for choosing [facility]  for your care. Our goal is always to provide you with excellent care. Hearing back from our patients is one way we can continue to improve our services. Please take a few minutes to complete the written survey that you may receive in the mail after your visit with  us. Thank you!             Your Updated Medication List - Protect others around you: Learn how to safely use, store and throw away your medicines at www.disposemymeds.org.          This list is accurate as of 4/4/18  3:11 PM.  Always use your most recent med list.                   Brand Name Dispense Instructions for use Diagnosis    aspirin 81 MG EC tablet     30 tablet    Take 1 tablet (81 mg) by mouth daily    Acute systolic congestive heart failure (H)       atorvastatin 10 MG tablet    LIPITOR    90 tablet    TAKE 1 TABLET BY MOUTH EVERY DAY    Acute systolic congestive heart failure (H)       digoxin 250 MCG tablet    LANOXIN    30 tablet    TAKE 1 TABLET BY MOUTH EVERY DAY    Acute on chronic systolic heart failure (H)       hydrALAZINE 50 MG tablet    APRESOLINE    30 tablet    Take 1 tablet (50 mg) by mouth 3 times daily    Nonischemic cardiomyopathy (H)       lisinopril 10 MG tablet    PRINIVIL/ZESTRIL    120 tablet    Take 15 mg in AM and 20 mg in PM    Chronic systolic heart failure (H), Acute systolic congestive heart failure (H)       METFORMIN HCL PO      Take 1,000 mg by mouth 2 times daily    Chronic systolic heart failure (H), Acute systolic congestive heart failure (H)       metoprolol succinate 25 MG 24 hr tablet    TOPROL-XL    90 tablet    Take 1 tablet (25 mg) by mouth daily    Acute on chronic systolic heart failure (H)       potassium chloride SA 20 MEQ CR tablet    K-DUR/KLOR-CON M    60 tablet    Take 1 tablet (20 mEq) by mouth 2 times daily    Acute systolic congestive heart failure (H)       sildenafil 50 MG tablet    VIAGRA    20 tablet    Take 1 tablet (50 mg) by mouth daily as needed    Drug-induced impotence       spironolactone 25 MG tablet    ALDACTONE    30 tablet    Take 1 tablet (25 mg) by mouth daily    Acute on chronic systolic heart failure (H)       torsemide 20 MG tablet    DEMADEX    180 tablet    Take 2 tablets (40 mg) by mouth 2 times daily    Acute systolic congestive  heart failure (H), Nonischemic cardiomyopathy (H)       traZODone 100 MG tablet    DESYREL    90 tablet    Take 1 tablet (100 mg) by mouth nightly as needed for sleep    Insomnia, unspecified type

## 2018-04-04 NOTE — NURSING NOTE
Diet: Patient instructed regarding a heart failure healthy diet, including discussion of reduced fat and 2000 mg daily sodium restriction, daily weights, medication purpose and compliance, fluid restrictions and resources for patient and family to access for assistance with heart failure management.       Labs: Patient was given results of the laboratory testing obtained today and patient was instructed about when to return for the next laboratory testing.    Med Reconcile: Reviewed and verified all current medications with the patient. The updated medication list was printed and given to the patient.    Return Appointment: Patient given instructions regarding scheduling next clinic visit.     Patient stated he understood all health information given and agreed to call with further questions or concerns.

## 2018-04-04 NOTE — PATIENT INSTRUCTIONS
You were seen today in the Cardiovascular Clinic at the [facility].     Cardiology Providers you saw during your visit: Aster NARANJO, CNP       1. Hold torsemide dose this evening.   2. Metolazone discontinued from your medication list  3. Decrease torsemide to 20 mg twice a day.   4. Please have labs drawn locally on 4/6.   5. Follow up with CORE clinic as scheduled on May 2nd.   6. We will submit your information for the cardiomems procedure and will keep you posted once it is approved.   7. Call with any questions or concerns. [phone] ARAVIND Valencia      Results for JUAN SHEN (MRN [id number]) as of 4/4/2018 14:24   Ref. Range 4/4/2018 13:34   Sodium Latest Ref Range: 133 - 144 mmol/L 133   Potassium Latest Ref Range: 3.4 - 5.3 mmol/L 4.8   Chloride Latest Ref Range: 94 - 109 mmol/L 97   Carbon Dioxide Latest Ref Range: 20 - 32 mmol/L 29   Urea Nitrogen Latest Ref Range: 7 - 30 mg/dL 33 (H)   Creatinine Latest Ref Range: 0.66 - 1.25 mg/dL 1.49 (H)   GFR Estimate Latest Ref Range: >60 mL/min/1.7m2 48 (L)   GFR Estimate If Black Latest Ref Range: >60 mL/min/1.7m2 58 (L)   Calcium Latest Ref Range: 8.5 - 10.1 mg/dL 9.4   Anion Gap Latest Ref Range: 3 - 14 mmol/L 6   Albumin Latest Ref Range: 3.4 - 5.0 g/dL 3.8   Protein Total Latest Ref Range: 6.8 - 8.8 g/dL 8.1   Bilirubin Total Latest Ref Range: 0.2 - 1.3 mg/dL 1.1   Alkaline Phosphatase Latest Ref Range: 40 - 150 U/L 108   ALT Latest Ref Range: 0 - 70 U/L 24   AST Latest Ref Range: 0 - 45 U/L 22   N-Terminal Pro Bnp Latest Ref Range: 0 - 125 pg/mL 1671 (H)   Glucose Latest Ref Range: 70 - 99 mg/dL 173 (H)           Please limit your fluid intake to 2 L (64 ounces) daily.  2 Liters a day = 8.5 cups, or 72 ounces.  Please limit your salt intake to 2 grams a day or less.    If you gain 2# in 24 hours or 5# in one week call Annie Samll RN so we can adjust your medications as needed over the phone.    Please feel free to call me with any  "questions or concerns.      Annie Small RN BSN   [facility] [place] [facility]  Cardiology Care Coordinator-Heart Failure Clinic    Questions and [phone].   First press #1 for the University and then press #3 for \"Medical Questions\" to reach us Cardiology Nurses.     On Call Cardiologist for after hours or on weekends: [phone]   option #4 and ask to speak to the on-call Cardiologist. Inform them you are a CORE/heart failure patient at the [place].        If you need a medication refill please contact your pharmacy.  Please allow 3 business days for your refill to be completed.  _______________________________________________________  C.O.R.E. CLINIC Cardiomyopathy, Optimization, Rehabilitation, Education   The C.O.R.E. CLINIC is a heart failure specialty clinic within the [facility] [place] [facility] where you will work with specialized nurse practitioners dedicated to helping patients with heart failure carefully adjust medications, receive education, and learn who and when to call if symptoms develop. They specialize in helping you better understand your condition, slow the progression of your disease, improve the length and quality of your life, help you detect future heart problems before they become life threatening, and avoid hospitalizations.  As always, thank you for trusting us with your health care needs!        "

## 2018-04-11 ENCOUNTER — TELEPHONE (OUTPATIENT)
Dept: CARDIOLOGY | Facility: CLINIC | Age: 63
End: 2018-04-11

## 2018-04-11 NOTE — TELEPHONE ENCOUNTER
Pt called triage inquiring about the results of his BMP that he completed on 04/10/2018.  Informed Pt, Aster's nurse will be sent his inquiry and we will contact him back once reviewed. Pt's best contact number is [phone].    Luma Lawrence LPN

## 2018-04-12 ENCOUNTER — CARE COORDINATION (OUTPATIENT)
Dept: CARDIOLOGY | Facility: CLINIC | Age: 63
End: 2018-04-12

## 2018-04-12 ENCOUNTER — TELEPHONE (OUTPATIENT)
Dept: CARDIOLOGY | Facility: CLINIC | Age: 63
End: 2018-04-12

## 2018-04-12 DIAGNOSIS — I50.22 CHRONIC SYSTOLIC HEART FAILURE (H): Primary | ICD-10-CM

## 2018-04-12 NOTE — PROGRESS NOTES
After reviewing labs (potassium of 5.5) pt called and recommendations per Aster Purcell APRN, CNP given for the patient to have labs drawn today and to be seen in the clinic tomorrow. Pt states he is leaving on a plane for [place] tomorrow morning and cannot be seen. After communicating this to Aster uPrcell and discussing with the cardiology team, her recommendation is to present to local ER for assessment and labs. RN reinforced with a potassium of 5.5 his risk of cardiac arrhythmias is higher and getting on a plane is not safe. Pt verbalized an understanding. After multiple conversations, pt agrees to present to his local ER. RN provided a phone number to be contacted at for updates and questions.

## 2018-04-12 NOTE — TELEPHONE ENCOUNTER
Called patient to discuss labs from 4/10 which I just received this afternoon - done at outside lab. He was due to repeat BMP on 4/6 for recheck (Cr was 1.5 and 1.3 felt to be volume depleted so we decreased torsemide) but didn't have these drawn until 4/10. His K was 5.5 and Cr 1.6. Asked patient to hold potassium and spironolactone. Discussed that the safest thing for him to do given that he live 200 mi away is to go to the ED for re-evaluation/BMP recheck. Patient initially declined due to cost but is now agreeable. From a symptom standpoint, he feels dry. He is currently taking torsemide 20 mg bid. He is planning to go to [place] tomorrow until May 2. Patient will call JORGE Valencia RN when he is in the ED and I will call the ED provider to discuss plan of care.       Aster Purcell, TAMANNA, NP-C  4/12/2018

## 2018-04-19 ENCOUNTER — CARE COORDINATION (OUTPATIENT)
Dept: CARDIOLOGY | Facility: CLINIC | Age: 63
End: 2018-04-19

## 2018-04-19 DIAGNOSIS — I42.8 NONISCHEMIC CARDIOMYOPATHY (H): ICD-10-CM

## 2018-04-19 DIAGNOSIS — I50.21 ACUTE SYSTOLIC CONGESTIVE HEART FAILURE (H): ICD-10-CM

## 2018-04-19 RX ORDER — TORSEMIDE 20 MG/1
20 TABLET ORAL DAILY
Qty: 180 TABLET | Refills: 1 | Status: ON HOLD | OUTPATIENT
Start: 2018-04-12 | End: 2018-06-15

## 2018-04-20 ENCOUNTER — DOCUMENTATION ONLY (OUTPATIENT)
Dept: CARDIOLOGY | Facility: CLINIC | Age: 63
End: 2018-04-20

## 2018-04-20 ENCOUNTER — CARE COORDINATION (OUTPATIENT)
Dept: CARDIOLOGY | Facility: CLINIC | Age: 63
End: 2018-04-20
Payer: COMMERCIAL

## 2018-04-20 DIAGNOSIS — I50.21 ACUTE SYSTOLIC CONGESTIVE HEART FAILURE (H): Primary | ICD-10-CM

## 2018-04-20 LAB
ANION GAP SERPL CALCULATED.3IONS-SCNC: ABNORMAL MMOL/L
BUN SERPL-MCNC: 25 MG/DL
CALCIUM SERPL-MCNC: 9.1 MG/DL
CHLORIDE SERPLBLD-SCNC: 100 MMOL/L
CO2 SERPL-SCNC: 27 MMOL/L
CREAT SERPL-MCNC: 1.28 MG/DL
GFR SERPL CREATININE-BSD FRML MDRD: 60 ML/MIN/1.73M2
GLUCOSE SERPL-MCNC: 115 MG/DL (ref 70–99)
POTASSIUM SERPL-SCNC: 4.6 MMOL/L
SODIUM SERPL-SCNC: 136 MMOL/L

## 2018-04-20 NOTE — PROGRESS NOTES
Results reviewed by Kylie Faye NP and called to patient via phone with additional orders.  Fabiano verbalizes understanding and agrees with plan of care. Sparkle Joel RN      Date: 4/20/2018  Time of Call: 3:32 PM  Diagnosis:  Heart failure  VORB - Ordering provider: Kylie Faye NP   Order: Increase your torsemide to 20mg BID until your weight decreases back to 212lbs then decrease back to torsemide 20mg daily.    Order received by: Sparkle Joel RN   Follow-up/additional notes: We discussed finding a HF team to see while he's in [place].  He states he's only there for another week then coming back up.  Has a CORE appt here on 5/11.

## 2018-04-20 NOTE — PROGRESS NOTES
Reviewed patients symptoms with Kylie Faye NP. Faxed order for labs to be drawn and will follow up from there.   Cecelia Palacios RN

## 2018-04-20 NOTE — PROGRESS NOTES
Pt calling in stating that he thinks his weight is going up.  He has only been taking 20 mg of the torsemide.  He is not having shortness of breath, feels sluggish, no ankle swelling.  Pt states that he is keeping to a low sodium diet. Pts weight is at 215 lbs up about 3 lbs from 212 lbs.  He is currently in [place] and found a lab that could draw labs if needed.  Lab is [facility] Fax: [phone]

## 2018-04-23 ENCOUNTER — CARE COORDINATION (OUTPATIENT)
Dept: CARDIOLOGY | Facility: CLINIC | Age: 63
End: 2018-04-23

## 2018-04-23 NOTE — PROGRESS NOTES
Call placed to check in to see how patient is doing since we increased torsemide until patient loses 3 lbs. No answer. U/a to leave a VM, mailbox continues to be full.

## 2018-04-29 DIAGNOSIS — I50.23 ACUTE ON CHRONIC SYSTOLIC HEART FAILURE (H): ICD-10-CM

## 2018-04-30 RX ORDER — DIGOXIN 250 MCG
TABLET ORAL
Qty: 90 TABLET | Refills: 3 | Status: ON HOLD | OUTPATIENT
Start: 2018-04-30 | End: 2018-11-21

## 2018-05-07 ENCOUNTER — PRE VISIT (OUTPATIENT)
Dept: CARDIOLOGY | Facility: CLINIC | Age: 63
End: 2018-05-07

## 2018-05-11 ENCOUNTER — TELEPHONE (OUTPATIENT)
Dept: CARDIOLOGY | Facility: CLINIC | Age: 63
End: 2018-05-11

## 2018-05-11 ENCOUNTER — OFFICE VISIT (OUTPATIENT)
Dept: CARDIOLOGY | Facility: CLINIC | Age: 63
End: 2018-05-11
Attending: NURSE PRACTITIONER
Payer: COMMERCIAL

## 2018-05-11 VITALS
BODY MASS INDEX: 32.22 KG/M2 | HEART RATE: 75 BPM | OXYGEN SATURATION: 97 % | WEIGHT: 217.5 LBS | HEIGHT: 69 IN | DIASTOLIC BLOOD PRESSURE: 58 MMHG | SYSTOLIC BLOOD PRESSURE: 95 MMHG

## 2018-05-11 DIAGNOSIS — I50.22 CHRONIC SYSTOLIC HEART FAILURE (H): ICD-10-CM

## 2018-05-11 DIAGNOSIS — I50.21 ACUTE SYSTOLIC CONGESTIVE HEART FAILURE (H): ICD-10-CM

## 2018-05-11 LAB
ANION GAP SERPL CALCULATED.3IONS-SCNC: 10 MMOL/L (ref 3–14)
BUN SERPL-MCNC: 22 MG/DL (ref 7–30)
CALCIUM SERPL-MCNC: 9.1 MG/DL (ref 8.5–10.1)
CHLORIDE SERPL-SCNC: 100 MMOL/L (ref 94–109)
CO2 SERPL-SCNC: 27 MMOL/L (ref 20–32)
CREAT SERPL-MCNC: 1.33 MG/DL (ref 0.66–1.25)
GFR SERPL CREATININE-BSD FRML MDRD: 54 ML/MIN/1.7M2
GLUCOSE SERPL-MCNC: 148 MG/DL (ref 70–99)
POTASSIUM SERPL-SCNC: 3.6 MMOL/L (ref 3.4–5.3)
SODIUM SERPL-SCNC: 136 MMOL/L (ref 133–144)

## 2018-05-11 PROCEDURE — 99214 OFFICE O/P EST MOD 30 MIN: CPT | Mod: ZP | Performed by: NURSE PRACTITIONER

## 2018-05-11 PROCEDURE — 36415 COLL VENOUS BLD VENIPUNCTURE: CPT | Performed by: NURSE PRACTITIONER

## 2018-05-11 PROCEDURE — G0463 HOSPITAL OUTPT CLINIC VISIT: HCPCS | Mod: 25,ZF

## 2018-05-11 PROCEDURE — 80048 BASIC METABOLIC PNL TOTAL CA: CPT | Performed by: NURSE PRACTITIONER

## 2018-05-11 ASSESSMENT — PAIN SCALES - GENERAL: PAINLEVEL: NO PAIN (0)

## 2018-05-11 NOTE — MR AVS SNAPSHOT
"              After Visit Summary   2018    Danielito Chu    MRN: [id number]           Patient Information     Date Of Birth          1955        Visit Information        Provider Department      2018 8:30 AM Deysi Mattson APRN CNP [facility]        Today's Diagnoses     Chronic systolic heart failure (H)        Acute systolic congestive heart failure (H)          Care Instructions    You were seen today in the Cardiovascular Clinic at the [facility].     Cardiology Providers you saw during your visit: Deysi NARANJO CNP      Follow up and medication changes:  1. We will call you with your medication changes and lab results  2. Follow up with Dr. Watkins as scheduled 18.          Please limit your fluid intake to 2 L (68 ounces) daily.  2 Liters a day = 8.5 cups, or 68 ounces.  Please limit your salt intake to 2 grams a day or less.     If you gain 2# in 24 hours or 5# in one week call Cecelia Palacios RN so we can adjust your medications as needed over the phone.     Please feel free to call me with any questions or concerns.       Cecelia Palacios RN  [facility]  Cardiology Care Coordinator-Heart Failure Clinic     Questions and [phone].   First press #1 for the University and then press #3 for \"Medical Questions\" to reach us Cardiology Nurses.      On Call Cardiologist for after hours or on weekends: [phone]   option #4 and ask to speak to the on-call Cardiologist. Inform them you are a CORE/heart failure patient at the [place].           If you need a medication refill please contact your pharmacy.  Please allow 3 business days for your refill to be completed.  _______________________________________________________  C.O.R.E. CLINIC Cardiomyopathy, Optimization, Rehabilitation, Education   The C.O.R.E. CLINIC is a heart failure specialty clinic within the [facility] " where you will work with specialized nurse practitioners dedicated to helping patients with heart failure carefully adjust medications, receive education, and learn who and when to call if symptoms develop. They specialize in helping you better understand your condition, slow the progression of your disease, improve the length and quality of your life, help you detect future heart problems before they become life threatening, and avoid hospitalizations.  As always, thank you for trusting us with your health care needs!             Follow-ups after your visit        Your next 10 appointments already scheduled     Jul 19, 2018  2:00 PM CDT   Lab with UC LAB   [facility] ([facility])    [address] [place] [address]  1st Floor  [facility] [id number] [phone]            Jul 19, 2018  2:30 PM CDT   (Arrive by 2:15 PM)   Implanted Defibulator with Uc Cv Device 1   [facility] ([facility])    [address] [place] [address]  [address] [place] [id number] [phone]            Jul 19, 2018  3:00 PM CDT   (Arrive by 2:45 PM)   RETURN HEART FAILURE with Lorena Watkins MD   [facility])    [address] [place] [address]  [address] [place] [id number] [phone]              Who to contact     If you have questions or need follow up information about today's clinic visit or your schedule please contact [facility] directly at [phone].  Normal or non-critical lab and imaging results will be communicated to you by MyChart, letter or phone within 4 business days after the clinic has received the results. If you do not hear from us within 7 days, please contact the clinic through MyChart or phone. If you have a critical or abnormal lab result, we will notify you by phone as soon as possible.  Submit refill requests through [employer] or call your pharmacy and they will forward the refill request to us. Please  "allow 3 business days for your refill to be completed.          Additional Information About Your Visit        MyChart Information     [employer] lets you send messages to your doctor, view your test results, renew your prescriptions, schedule appointments and more. To sign up, go to [email or web address] . Click on \"Log in\" on the left side of the screen, which will take you to the Welcome page. Then click on \"Sign up Now\" on the right side of the page.     You will be asked to enter the access code listed below, as well as some personal information. Please follow the directions to create your username and password.     Your access code is: [email or web address]  Expires: 2018  9:05 AM     Your access code will  in 90 days. If you need help or a new code, please call your [place] clinic or [phone].        Care EveryWhere ID     This is your Care EveryWhere ID. This could be used by other organizations to access your [place] medical records  [id number]        Your Vitals Were     Pulse Height Pulse Oximetry BMI (Body Mass Index)          75 1.753 m (5' 9\") 97% 32.12 kg/m2         Blood Pressure from Last 3 Encounters:   18 95/58   18 (!) 83/55   18 98/65    Weight from Last 3 Encounters:   18 98.7 kg (217 lb 8 oz)   18 96.8 kg (213 lb 4.8 oz)   18 99.9 kg (220 lb 4.8 oz)              We Performed the Following     Follow-Up with [facility]          Today's Medication Changes          These changes are accurate as of 18  9:05 AM.  If you have any questions, ask your nurse or doctor.               These medicines have changed or have updated prescriptions.        Dose/Directions    metFORMIN 500 MG tablet   Commonly known as:  GLUCOPHAGE   This may have changed:    - how much to take  - when to take this   Used for:  Chronic systolic heart failure (H), Acute systolic congestive heart failure (H)   Changed by:  Deysi Mattson APRN CNP        Dose:  500 mg   Take 1 " tablet (500 mg) by mouth 2 times daily (with meals)   Refills:  0                Primary Care Provider Office Phone # Fax #    Bashir Hines [phone]       [facility] [address] [place] [address]        Equal Access to Services     ALEX RODGERS : Hadii aad ku hadleighfrancisco javier Sotabatha, waaxda luqadaha, qaybta kaalmada adeegyada, aaron burks sarannuvia reeserupagold pearson. So [facility] [phone].    ATENCIÓN: Si habla español, tiene a stokes disposición servicios gratuitos de asistencia lingüística. Llame al [phone].    We comply with applicable federal civil rights laws and [place] laws. We do not discriminate on the basis of race, color, national origin, age, disability, sex, sexual orientation, or gender identity.            Thank you!     Thank you for choosing [facility]  for your care. Our goal is always to provide you with excellent care. Hearing back from our patients is one way we can continue to improve our services. Please take a few minutes to complete the written survey that you may receive in the mail after your visit with us. Thank you!             Your Updated Medication List - Protect others around you: Learn how to safely use, store and throw away your medicines at www.disposemymeds.org.          This list is accurate as of 5/11/18  9:05 AM.  Always use your most recent med list.                   Brand Name Dispense Instructions for use Diagnosis    aspirin 81 MG EC tablet     30 tablet    Take 1 tablet (81 mg) by mouth daily    Acute systolic congestive heart failure (H)       atorvastatin 10 MG tablet    LIPITOR    90 tablet    TAKE 1 TABLET BY MOUTH EVERY DAY    Acute systolic congestive heart failure (H)       digoxin 250 MCG tablet    LANOXIN    90 tablet    TAKE 1 TABLET (250 MCG) BY MOUTH DAILY    Acute on chronic systolic heart failure (H)       hydrALAZINE 50 MG tablet    APRESOLINE    30 tablet    Take 1 tablet (50 mg) by mouth 3 times daily    Nonischemic  cardiomyopathy (H)       lisinopril 10 MG tablet    PRINIVIL/ZESTRIL    120 tablet    Take 15 mg in AM and 20 mg in PM    Chronic systolic heart failure (H), Acute systolic congestive heart failure (H)       metFORMIN 500 MG tablet    GLUCOPHAGE     Take 1 tablet (500 mg) by mouth 2 times daily (with meals)    Chronic systolic heart failure (H), Acute systolic congestive heart failure (H)       metoprolol succinate 25 MG 24 hr tablet    TOPROL-XL    90 tablet    Take 1 tablet (25 mg) by mouth daily    Acute on chronic systolic heart failure (H)       sildenafil 50 MG tablet    VIAGRA    20 tablet    Take 1 tablet (50 mg) by mouth daily as needed    Drug-induced impotence       torsemide 20 MG tablet    DEMADEX    180 tablet    Take 1 tablet (20 mg) by mouth daily    Acute systolic congestive heart failure (H), Nonischemic cardiomyopathy (H)       traZODone 100 MG tablet    DESYREL    90 tablet    Take 1 tablet (100 mg) by mouth nightly as needed for sleep    Insomnia, unspecified type

## 2018-05-11 NOTE — LETTER
2018    RE: Danielito Chu  [address] [address] [address]     Dear Colleague,    Thank you for the opportunity to participate in the care of your patient, Danielito Chu, at the [facility] HEART [facility]. Please see a copy of my visit note below.    HPI:   Mr. Chu is a 61 year old male with a past medical history including HTN, SHIRLEY, Hyperlipidemia, NICM s/p ICD 17. She was was admitted to [facility] beds at the [facility] in [place], [place] for acute on chronic decompensated SCHF from 3/29/17-17. He was initially diagnosed with CM 10/2013 secondary to viral etiology per [facility]. He presents to CORE clinic for routine follow up.     He complains of excessive fatigue, which he attributes to low blood pressure. He notes his blood pressure to be 85/55 at the ED in [place]. He can currently walk 3-4 blocks, but is limited by BAJWA. He feels like he is feeling worse in comparison to last summer. He denies fever, chills, lightheadedness, dizziness, chest pain, palpitations, SOB, BAJWA, PND, orthopnea, nausea, vomiting, diarrhea, or LE edema. His weight remains stable at 211-214 lbs. However, he notes he took an additional dose of Torsemide yesterday and is feeling much better today. He continues to follow a low sodium diet.  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     PAST MEDICAL HISTORY:  Past Medical History:   Diagnosis Date     Acute systolic congestive heart failure (H) 2017     HTN (hypertension)      Hyperlipidemia      Mitral regurgitation      Nocturnal oxygen desaturation 3/29/2018     Nonischemic cardiomyopathy (H) 2017     SHIRLEY (obstructive sleep apnea)      Pacemaker 2017    ICD 17       FAMILY HISTORY:  Family History   Problem Relation Age of Onset     Heart Failure Father       at age 86     Heart Failure Paternal Uncle       at 66      Myocardial Infarction Paternal Uncle        at age 62     Coronary Artery Disease Mother       during CABG at age 41       SOCIAL HISTORY:  Social History     Social History     Marital status: Single     Spouse name: N/A     Number of children: N/A     Years of education: N/A     Social History Main Topics     Smoking status: Former Smoker     Smokeless tobacco: Never Used     Alcohol use Not on file     Drug use: Not on file     Sexual activity: Not on file     Other Topics Concern     Not on file     Social History Narrative       CURRENT MEDICATIONS:  Outpatient Medications Prior to Visit   Medication Sig Dispense Refill     aspirin EC 81 MG EC tablet Take 1 tablet (81 mg) by mouth daily 30 tablet 3     atorvastatin (LIPITOR) 10 MG tablet TAKE 1 TABLET BY MOUTH EVERY DAY 90 tablet 3     digoxin (LANOXIN) 250 MCG tablet TAKE 1 TABLET (250 MCG) BY MOUTH DAILY 90 tablet 3     hydrALAZINE (APRESOLINE) 50 MG tablet Take 1 tablet (50 mg) by mouth 3 times daily 30 tablet 1     lisinopril (PRINIVIL/ZESTRIL) 10 MG tablet Take 15 mg in AM and 20 mg in  tablet 3     metoprolol succinate (TOPROL-XL) 25 MG 24 hr tablet Take 1 tablet (25 mg) by mouth daily 90 tablet 3     torsemide (DEMADEX) 20 MG tablet Take 1 tablet (20 mg) by mouth daily 180 tablet 1     traZODone (DESYREL) 100 MG tablet Take 1 tablet (100 mg) by mouth nightly as needed for sleep 90 tablet 1     sildenafil (VIAGRA) 50 MG tablet Take 1 tablet (50 mg) by mouth daily as needed (Patient not taking: Reported on 2018) 20 tablet 1     METFORMIN HCL PO Take 1,000 mg by mouth 2 times daily       No facility-administered medications prior to visit.        ROS:   CONSTITUTIONAL: Denies fever, chills, or weight fluctuations. He complains of fatige.   HEENT: Denies headache, vision changes, and changes in speech.   CV: Refer to HPI.   PULMONARY:Denies shortness of breath, cough, or previous TB exposure.   GI:Denies nausea, vomiting, diarrhea, and abdominal pain. Bowel movements are  "regular.   :Denies urinary alterations, dysuria, urinary frequency, hematuria, and abnormal drainage.   EXT:Denies lower extremity edema.   SKIN:Denies abnormal rashes or lesions.   MUSCULOSKELETAL:Denies upper or lower extremity weakness and pain.   NEUROLOGIC:Denies lightheadedness, dizziness, seizures, or upper or lower extremity paresthesia.   Psych: He complains of depressed mood, but no suicidal ideation.     EXAM:  BP 95/58 (BP Location: Left arm, Patient Position: Chair, Cuff Size: Adult Regular)  Pulse 75  Ht 1.753 m (5' 9\")  Wt 98.7 kg (217 lb 8 oz)  SpO2 97%  BMI 32.12 kg/m2  GENERAL: Appears alert and oriented times three.   HEENT: Eye symmetrical and free of discharge bilaterally. Mucous membranes moist and without lesions.  NECK: Supple and without lymphadenopathy. JVD 8 cm.   CV: RRR, S1S2 present without murmur, rub, or gallop.   RESPIRATORY: Respirations regular, even, and unlabored. Lungs CTA throughout.   GI: Soft and non distended with normoactive bowel sounds present in all quadrants. No tenderness, rebound, guarding. No organomegaly.   EXTREMITIES: No peripheral edema. 2+ bilateral pedal pulses.   NEUROLOGIC: Alert and orientated x 3. CN II-XII grossly intact. No focal deficits.   MUSCULOSKELETAL: No joint swelling or tenderness.   SKIN: No jaundice. No rashes or lesions.     Labs:  CBC RESULTS:  Lab Results   Component Value Date    WBC 9.6 03/29/2018    RBC 4.86 03/29/2018    HGB 15.2 03/29/2018    HCT 48.3 03/29/2018    MCV 99 03/29/2018    MCH 31.3 03/29/2018    MCHC 31.5 03/29/2018    RDW 14.6 03/29/2018     03/29/2018       CMP RESULTS:  Lab Results   Component Value Date     04/20/2018    POTASSIUM 4.6 04/20/2018    CHLORIDE 100 04/20/2018    CO2 27 04/20/2018    ANIONGAP 6 04/04/2018     (H) 04/20/2018    BUN 25 (H) 04/20/2018    CR 1.28 (H) 04/20/2018    GFRESTIMATED 60 04/20/2018    GFRESTBLACK 70 04/20/2018    ASHLEE 9.1 04/20/2018    BILITOTAL 1.1 04/04/2018 "    ALBUMIN 3.8 04/04/2018    ALKPHOS 108 04/04/2018    ALT 24 04/04/2018    AST 22 04/04/2018        INR RESULTS:  Lab Results   Component Value Date    INR 1.20 (H) 03/29/2017       Lab Results   Component Value Date    MAG 2.4 (H) 03/29/2018     Lab Results   Component Value Date    NTBNPI 2597 (H) 03/29/2017     Lab Results   Component Value Date    NTBNP 1671 (H) 04/04/2018       Diagnostics:  Echo 6/15/17:  The LVEF is visually estimated in the 30 % range (calculated, 28 %.) Severe  diffuse hypokinesis with inferior wall akinesis is present.  Mild to moderate right ventricular dilation is present. Global right  ventricular function is normal.  Mild aortic insufficiency is present.  Pulmonary artery systolic pressure is normal.  Dilation of the inferior vena cava is present with normal respiratory  variation in diameter. Estimated mean right atrial pressure is 8 mmHg.  No pericardial effusion is present.  Previous study not available for comparison.    LHC/RHC 4/3/17:  Normal Coronary arteries.   mRA-12  RV-63/15  mPA-48  mPCW-25  MARILYNN CO-3.5  MARILYNN CI-1.6    CPX 6/15/17:  VE/VCO2: 38 with RER of 1.18 and FC of 20.1%.     Assessment and Plan:   Mr. Chu is a 61 year old male with a past medical history including HTN, SHIRLEY, Hyperlipidemia, NICM s/p ICD 4/7/17. He presents to CORE clinic for initial evaluation following hospitalization for acute decompensation of SCHF.      Chronic systolic heart failure secondary to unknown etiology, likely viral.   Stage D, NYHA Class III  ACEi/ARB:  D/C Lisinopril and after 36 hours transition to Entresto 49/51 mg po BID. Repeat BMP in 1 week.   BB Toprol XL 25 mg po daily.   Aldosterone antagonist Aldactone discontinued in setting of hyperkalemia.   SCD prophylaxis ICD  Fluid status: Euvolemic. Continue Torsemide at 20 mg po daily.    Sleep Apnea Evaluation: SHIRLEY on CPAP.   - Given persistent fatigue and prior recommendation per primary Cardiologist will obtain repeat RHC/CPX in  2 weeks.      Moderate Mitral Regurgitation.   - Repeat Echo consistent with mild mitral insufficiency 6/15/17.     Hyperlipidemia.   - Lipitor.   - Management per PCP.      HTN.   - Continue Toprol XL and Lisinopril.     DM Type II.   - Note currently on Metformin, which is contraindicated in transient renal function.   - Recommended follow up with PCP to address.     Situational Depression. Secondary to Chronic illness with restraints to his personal life.    - Recommended follow up with PCP to consider antidepressant.     Follow up BMP in 1-2 weeks. CPX/RHC in 2 weeks. Follow up with Dr. Watkins as scheduled 7/19/18 pending diagnostics.       Deysi Mattson  5/11/2018    DRISS LINN

## 2018-05-11 NOTE — NURSING NOTE
Chief Complaint   Patient presents with     Follow Up For     Return CORE: 64 yo male with HFrEF. Presenting for f/u with labs prior.      Vitals were taken and medications were reconciled.    Charlette Jiménez RMA  8:17 AM

## 2018-05-11 NOTE — TELEPHONE ENCOUNTER
Notified patient of stable Cr. Recommended cessation of Hydralazine and Lisinopril. Recommended initiation of Entresto 49/51 mg po BID 36 hours after cessation of Lisinopril. He states he has a month supply at home, which he thinks is a 24/26 mg dose. He will clarify the dose and notify us. If he has 24/26 he will take 2 weeks of this and undergo RHC and CPX in 2 weeks with plan to increase dose quickly given the transition dose is 49/51 mg po BID. Repeat BMP prior to RHC/CPX. RN to set up RHC/CPX and initiate prior auth for higher dose of Entresto.     Deysi Mattson NP  5/11/2018

## 2018-05-11 NOTE — PROGRESS NOTES
HPI:   Mr. Chu is a 61 year old male with a past medical history including HTN, SHIRLEY, Hyperlipidemia, NICM s/p ICD 17. She was was admitted to [facility] in [place], [place] for acute on chronic decompensated SCHF from 3/29/17-17. He was initially diagnosed with CM 10/2013 secondary to viral etiology per [facility]. He presents to [facility] clinic for routine follow up.     He complains of excessive fatigue, which he attributes to low blood pressure. He notes his blood pressure to be 85/55 at the ED in [place]. He can currently walk 3-4 blocks, but is limited by BAJWA. He feels like he is feeling worse in comparison to last summer. He denies fever, chills, lightheadedness, dizziness, chest pain, palpitations, SOB, BAJWA, PND, orthopnea, nausea, vomiting, diarrhea, or LE edema. His weight remains stable at 211-214 lbs. However, he notes he took an additional dose of Torsemide yesterday and is feeling much better today. He continues to follow a low sodium diet.  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     PAST MEDICAL HISTORY:  Past Medical History:   Diagnosis Date     Acute systolic congestive heart failure (H) 2017     HTN (hypertension)      Hyperlipidemia      Mitral regurgitation      Nocturnal oxygen desaturation 3/29/2018     Nonischemic cardiomyopathy (H) 2017     SHIRLEY (obstructive sleep apnea)      Pacemaker 2017    ICD 17       FAMILY HISTORY:  Family History   Problem Relation Age of Onset     Heart Failure Father       at age 86     Heart Failure Paternal Uncle       at 66      Myocardial Infarction Paternal Uncle       at age 62     Coronary Artery Disease Mother       during CABG at age 41       SOCIAL HISTORY:  Social History     Social History     Marital status: Single     Spouse name: N/A     Number of children: N/A     Years of education: N/A     Social History Main Topics     Smoking status: Former Smoker      Smokeless tobacco: Never Used     Alcohol use Not on file     Drug use: Not on file     Sexual activity: Not on file     Other Topics Concern     Not on file     Social History Narrative       CURRENT MEDICATIONS:  Outpatient Medications Prior to Visit   Medication Sig Dispense Refill     aspirin EC 81 MG EC tablet Take 1 tablet (81 mg) by mouth daily 30 tablet 3     atorvastatin (LIPITOR) 10 MG tablet TAKE 1 TABLET BY MOUTH EVERY DAY 90 tablet 3     digoxin (LANOXIN) 250 MCG tablet TAKE 1 TABLET (250 MCG) BY MOUTH DAILY 90 tablet 3     hydrALAZINE (APRESOLINE) 50 MG tablet Take 1 tablet (50 mg) by mouth 3 times daily 30 tablet 1     lisinopril (PRINIVIL/ZESTRIL) 10 MG tablet Take 15 mg in AM and 20 mg in  tablet 3     metoprolol succinate (TOPROL-XL) 25 MG 24 hr tablet Take 1 tablet (25 mg) by mouth daily 90 tablet 3     torsemide (DEMADEX) 20 MG tablet Take 1 tablet (20 mg) by mouth daily 180 tablet 1     traZODone (DESYREL) 100 MG tablet Take 1 tablet (100 mg) by mouth nightly as needed for sleep 90 tablet 1     sildenafil (VIAGRA) 50 MG tablet Take 1 tablet (50 mg) by mouth daily as needed (Patient not taking: Reported on 4/4/2018) 20 tablet 1     METFORMIN HCL PO Take 1,000 mg by mouth 2 times daily       No facility-administered medications prior to visit.        ROS:   CONSTITUTIONAL: Denies fever, chills, or weight fluctuations. He complains of fatige.   HEENT: Denies headache, vision changes, and changes in speech.   CV: Refer to HPI.   PULMONARY:Denies shortness of breath, cough, or previous TB exposure.   GI:Denies nausea, vomiting, diarrhea, and abdominal pain. Bowel movements are regular.   :Denies urinary alterations, dysuria, urinary frequency, hematuria, and abnormal drainage.   EXT:Denies lower extremity edema.   SKIN:Denies abnormal rashes or lesions.   MUSCULOSKELETAL:Denies upper or lower extremity weakness and pain.   NEUROLOGIC:Denies lightheadedness, dizziness, seizures, or upper or  "lower extremity paresthesia.   Psych: He complains of depressed mood, but no suicidal ideation.     EXAM:  BP 95/58 (BP Location: Left arm, Patient Position: Chair, Cuff Size: Adult Regular)  Pulse 75  Ht 1.753 m (5' 9\")  Wt 98.7 kg (217 lb 8 oz)  SpO2 97%  BMI 32.12 kg/m2  GENERAL: Appears alert and oriented times three.   HEENT: Eye symmetrical and free of discharge bilaterally. Mucous membranes moist and without lesions.  NECK: Supple and without lymphadenopathy. JVD 8 cm.   CV: RRR, S1S2 present without murmur, rub, or gallop.   RESPIRATORY: Respirations regular, even, and unlabored. Lungs CTA throughout.   GI: Soft and non distended with normoactive bowel sounds present in all quadrants. No tenderness, rebound, guarding. No organomegaly.   EXTREMITIES: No peripheral edema. 2+ bilateral pedal pulses.   NEUROLOGIC: Alert and orientated x 3. CN II-XII grossly intact. No focal deficits.   MUSCULOSKELETAL: No joint swelling or tenderness.   SKIN: No jaundice. No rashes or lesions.     Labs:  CBC RESULTS:  Lab Results   Component Value Date    WBC 9.6 03/29/2018    RBC 4.86 03/29/2018    HGB 15.2 03/29/2018    HCT 48.3 03/29/2018    MCV 99 03/29/2018    MCH 31.3 03/29/2018    MCHC 31.5 03/29/2018    RDW 14.6 03/29/2018     03/29/2018       CMP RESULTS:  Lab Results   Component Value Date     04/20/2018    POTASSIUM 4.6 04/20/2018    CHLORIDE 100 04/20/2018    CO2 27 04/20/2018    ANIONGAP 6 04/04/2018     (H) 04/20/2018    BUN 25 (H) 04/20/2018    CR 1.28 (H) 04/20/2018    GFRESTIMATED 60 04/20/2018    GFRESTBLACK 70 04/20/2018    ASHLEE 9.1 04/20/2018    BILITOTAL 1.1 04/04/2018    ALBUMIN 3.8 04/04/2018    ALKPHOS 108 04/04/2018    ALT 24 04/04/2018    AST 22 04/04/2018        INR RESULTS:  Lab Results   Component Value Date    INR 1.20 (H) 03/29/2017       Lab Results   Component Value Date    MAG 2.4 (H) 03/29/2018     Lab Results   Component Value Date    NTBNPI 2597 (H) 03/29/2017     Lab " Results   Component Value Date    NTBNP 1671 (H) 04/04/2018       Diagnostics:  Echo 6/15/17:  The LVEF is visually estimated in the 30 % range (calculated, 28 %.) Severe  diffuse hypokinesis with inferior wall akinesis is present.  Mild to moderate right ventricular dilation is present. Global right  ventricular function is normal.  Mild aortic insufficiency is present.  Pulmonary artery systolic pressure is normal.  Dilation of the inferior vena cava is present with normal respiratory  variation in diameter. Estimated mean right atrial pressure is 8 mmHg.  No pericardial effusion is present.  Previous study not available for comparison.    LHC/RHC 4/3/17:  Normal Coronary arteries.   mRA-12  RV-63/15  mPA-48  mPCW-25  MARILYNN CO-3.5  MARILYNN CI-1.6    CPX 6/15/17:  VE/VCO2: 38 with RER of 1.18 and FC of 20.1%.     Assessment and Plan:   Mr. Chu is a 61 year old male with a past medical history including HTN, SHIRLEY, Hyperlipidemia, NICM s/p ICD 4/7/17. He presents to [facility] clinic for initial evaluation following hospitalization for acute decompensation of SCHF.      Chronic systolic heart failure secondary to unknown etiology, likely viral.   Stage D, NYHA Class III  ACEi/ARB: D/C Lisinopril and after 36 hours transition to Entresto 49/51 mg po BID. Repeat BMP in 1 week.   BB Toprol XL 25 mg po daily.   Aldosterone antagonist Aldactone discontinued in setting of hyperkalemia.   SCD prophylaxis ICD  Fluid status: Euvolemic. Continue Torsemide at 20 mg po daily.    Sleep Apnea Evaluation: SHIRLEY on CPAP.   - Given persistent fatigue and prior recommendation per primary Cardiologist will obtain repeat RHC/CPX in 2 weeks.      Moderate Mitral Regurgitation.   - Repeat Echo consistent with mild mitral insufficiency 6/15/17.     Hyperlipidemia.   - Lipitor.   - Management per PCP.      HTN.   - Continue Toprol XL and Lisinopril.     DM Type II.   - Note currently on Metformin, which is contraindicated in transient renal function.    - Recommended follow up with PCP to address.     Situational Depression. Secondary to Chronic illness with restraints to his personal life.    - Recommended follow up with PCP to consider antidepressant.     Follow up BMP in 1-2 weeks. CPX/RHC in 2 weeks. Follow up with Dr. Watkins as scheduled 7/19/18 pending diagnostics.       Deysi Mattson  5/11/2018          DRISS LINN

## 2018-05-11 NOTE — PATIENT INSTRUCTIONS
"You were seen today in the Cardiovascular Clinic at the [facility].     Cardiology Providers you saw during your visit: Deysi NARANJO CNP      Follow up and medication changes:  1. We will call you with your medication changes and lab results  2. Follow up with Dr. Watkins as scheduled 18.          Please limit your fluid intake to 2 L (68 ounces) daily.  2 Liters a day = 8.5 cups, or 68 ounces.  Please limit your salt intake to 2 grams a day or less.     If you gain 2# in 24 hours or 5# in one week call Cecelia Palacios RN so we can adjust your medications as needed over the phone.     Please feel free to call me with any questions or concerns.       Cecelia Palacios RN  [facility]  Cardiology Care Coordinator-Heart Failure Clinic     Questions and [phone].   First press #1 for the University and then press #3 for \"Medical Questions\" to reach us Cardiology Nurses.      On Call Cardiologist for after hours or on weekends: [phone]   option #4 and ask to speak to the on-call Cardiologist. Inform them you are a CORE/heart failure patient at the [place].           If you need a medication refill please contact your pharmacy.  Please allow 3 business days for your refill to be completed.  _______________________________________________________  C.O.R.E. CLINIC Cardiomyopathy, Optimization, Rehabilitation, Education   The C.O.R.E. CLINIC is a heart failure specialty clinic within the [facility] where you will work with specialized nurse practitioners dedicated to helping patients with heart failure carefully adjust medications, receive education, and learn who and when to call if symptoms develop. They specialize in helping you better understand your condition, slow the progression of your disease, improve the length and quality of your life, help you detect future heart problems before they become life " threatening, and avoid hospitalizations.  As always, thank you for trusting us with your health care needs!

## 2018-05-14 DIAGNOSIS — I50.21 ACUTE SYSTOLIC CONGESTIVE HEART FAILURE (H): Primary | ICD-10-CM

## 2018-05-14 RX ORDER — LIDOCAINE 40 MG/G
CREAM TOPICAL
Status: CANCELLED | OUTPATIENT
Start: 2018-05-14

## 2018-05-14 NOTE — PROGRESS NOTES
Date: 5/14/2018    Time of Call: 11:26 AM     Diagnosis:  Systolic heart failure     [ VORB ] Ordering provider: Deysi NARANJO CNP   Order: Cardiopulmonary Stress Test and Right Heart Cath with BMP prior in 2 weeks.      Order received by: Cecelia Palacios RN      Follow-up/additional notes: Both tests scheduled for Monday June 11th. Patient in agreement with this and verbalizes understanding. Letter mailed out with detailed instructions on time, location and prep.

## 2018-05-15 ENCOUNTER — CARE COORDINATION (OUTPATIENT)
Dept: CARDIOLOGY | Facility: CLINIC | Age: 63
End: 2018-05-15

## 2018-05-15 DIAGNOSIS — I50.21 ACUTE SYSTOLIC CONGESTIVE HEART FAILURE (H): Primary | ICD-10-CM

## 2018-05-15 NOTE — PROGRESS NOTES
Call placed to patient to confirm he had started Entresto. Confirms he does have the 24/26 mg tabs and is going to start them today. Took his last dose of Lisinopril on Saturday. Will fax order for BMP to [facility] for labs to be drawn in 2 weeks since was not able to coordinate his RHC and CPX until 6/11.

## 2018-05-18 ENCOUNTER — CARE COORDINATION (OUTPATIENT)
Dept: CARDIOLOGY | Facility: CLINIC | Age: 63
End: 2018-05-18

## 2018-05-18 NOTE — PROGRESS NOTES
Attempted to call Danielito to let him know his paperwork for his job was faxed yesterday and they should have it.  2 attempts and voice mailbox full.   Cecelia Palacios RN

## 2018-05-29 ENCOUNTER — ALLIED HEALTH/NURSE VISIT (OUTPATIENT)
Dept: CARDIOLOGY | Facility: CLINIC | Age: 63
End: 2018-05-29
Attending: INTERNAL MEDICINE
Payer: COMMERCIAL

## 2018-05-29 DIAGNOSIS — I42.8 NONISCHEMIC CARDIOMYOPATHY (H): Primary | ICD-10-CM

## 2018-05-29 PROCEDURE — 93296 REM INTERROG EVL PM/IDS: CPT | Mod: ZF

## 2018-05-29 PROCEDURE — 93295 DEV INTERROG REMOTE 1/2/MLT: CPT | Performed by: INTERNAL MEDICINE

## 2018-05-29 NOTE — MR AVS SNAPSHOT
After Visit Summary   5/29/2018    Danielito Chu    MRN: [id number]           Patient Information     Date Of Birth          1955        Visit Information        Provider Department      5/29/2018 6:00 AM UC ICD REMOTE [facility]        Today's Diagnoses     Nonischemic cardiomyopathy (H)    -  1       Follow-ups after your visit        Your next 10 appointments already scheduled     Jul 19, 2018  2:00 PM CDT   Lab with UC LAB   [facility])    [address] [place] [address]  1st Floor  [facility] [id number] [phone]            Jul 19, 2018  2:30 PM CDT   (Arrive by 2:15 PM)   Implanted Defibulator with Uc Cv Device 1   [facility] ([facility])    [address] [place] [address]  [address] [place] [id number] [phone]            Jul 19, 2018  3:00 PM CDT   (Arrive by 2:45 PM)   RETURN HEART FAILURE with Lorena Watkins MD   [facility] ([facility])    [address] [place] [address]  [address] [place] [id number] [phone]              Who to contact     If you have questions or need follow up information about today's clinic visit or your schedule please contact [facility] directly at [phone].  Normal or non-critical lab and imaging results will be communicated to you by MyChart, letter or phone within 4 business days after the clinic has received the results. If you do not hear from us within 7 days, please contact the clinic through MyChart or phone. If you have a critical or abnormal lab result, we will notify you by phone as soon as possible.  Submit refill requests through [employer] or call your pharmacy and they will forward the refill request to us. Please allow 3 business days for your refill to be completed.          Additional Information About Your Visit        Care EveryWhere ID     This is your Care EveryWhere ID. This could be used by other  organizations to access your [place] medical records  [id number]         Blood Pressure from Last 3 Encounters:   06/12/18 101/72   05/11/18 95/58   04/04/18 (!) 83/55    Weight from Last 3 Encounters:   06/12/18 97.2 kg (214 lb 3.2 oz)   05/11/18 98.7 kg (217 lb 8 oz)   04/04/18 96.8 kg (213 lb 4.8 oz)              We Performed the Following     INTERROGATION DEVICE EVAL REMOTE, PACER/ICD        Primary Care Provider Office Phone # Fax #    Bashir Hines [phone]       [facility] [address] [facility] [address]        Equal Access to Services     ALEX RODGERS : Hadii andres fuenteso Sotabatha, waaxda luqadaha, qaybta kaalmada adeegyada, aaron pearson. So [place] [phone].    ATENCIÓN: Si habla español, tiene a stokes disposición servicios gratuitos de asistencia lingüística. Llame al [phone].    We comply with applicable federal civil rights laws and [place] laws. We do not discriminate on the basis of race, color, national origin, age, disability, sex, sexual orientation, or gender identity.            Thank you!     Thank you for choosing [facility]  for your care. Our goal is always to provide you with excellent care. Hearing back from our patients is one way we can continue to improve our services. Please take a few minutes to complete the written survey that you may receive in the mail after your visit with us. Thank you!             Your Updated Medication List - Protect others around you: Learn how to safely use, store and throw away your medicines at www.disposemymeds.org.          This list is accurate as of 5/29/18 11:59 PM.  Always use your most recent med list.                   Brand Name Dispense Instructions for use Diagnosis    aspirin 81 MG EC tablet     30 tablet    Take 1 tablet (81 mg) by mouth daily    Acute systolic congestive heart failure (H)       atorvastatin 10 MG tablet    LIPITOR    90 tablet    TAKE 1 TABLET BY MOUTH  EVERY DAY    Acute systolic congestive heart failure (H)       digoxin 250 MCG tablet    LANOXIN    90 tablet    TAKE 1 TABLET (250 MCG) BY MOUTH DAILY    Acute on chronic systolic heart failure (H)       metFORMIN 500 MG tablet    GLUCOPHAGE     Take 1 tablet (500 mg) by mouth 2 times daily (with meals)    Chronic systolic heart failure (H), Acute systolic congestive heart failure (H)       metoprolol succinate 25 MG 24 hr tablet    TOPROL-XL    90 tablet    Take 1 tablet (25 mg) by mouth daily    Acute on chronic systolic heart failure (H)       sacubitril-valsartan 24-26 MG per tablet    ENTRESTO     Take 1 tablet by mouth 2 times daily        sildenafil 50 MG tablet    VIAGRA    20 tablet    Take 1 tablet (50 mg) by mouth daily as needed    Drug-induced impotence       torsemide 20 MG tablet    DEMADEX    180 tablet    Take 1 tablet (20 mg) by mouth daily    Acute systolic congestive heart failure (H), Nonischemic cardiomyopathy (H)       traZODone 100 MG tablet    DESYREL    90 tablet    Take 1 tablet (100 mg) by mouth nightly as needed for sleep    Insomnia, unspecified type

## 2018-05-30 ENCOUNTER — CARE COORDINATION (OUTPATIENT)
Dept: CARDIOLOGY | Facility: CLINIC | Age: 63
End: 2018-05-30

## 2018-05-30 NOTE — PROGRESS NOTES
Spoke with Patient and advise him that the labs orders were faxed to [facility] at [phone]. He will go in and get the drawn.   Message forwarded to [facility].

## 2018-05-30 NOTE — PROGRESS NOTES
[facility] Call Center    Phone Message    May a detailed message be left on voicemail: yes    Reason for Call: Order(s): Other:   Reason for requested: Pt is requesting his lab orders be faxed to [facility]. Please give him a call once they have been sent over.   Date needed: N/A  Provider name: Deysi Mattson       Action Taken: Message routed to:  [facility] Cardiology

## 2018-05-30 NOTE — PROGRESS NOTES
Attempted to get hold of patient to remind him to go get his labs - it has been 2 weeks since starting Entresto. No answer and voicemail full. Will continue to try to get hold of.   Cecelia Palacios RN

## 2018-05-31 ENCOUNTER — DOCUMENTATION ONLY (OUTPATIENT)
Dept: CARDIOLOGY | Facility: CLINIC | Age: 63
End: 2018-05-31

## 2018-05-31 LAB
ANION GAP SERPL CALCULATED.3IONS-SCNC: 19 MMOL/L (ref 6–20)
BUN SERPL-MCNC: 27 MG/DL (ref 9–20)
BUN/CREATININE RATIO: 22.5 MG/DL (ref 9–20)
CALCIUM SERPL-MCNC: 9.2 MG/DL (ref 8.4–10.2)
CHLORIDE SERPLBLD-SCNC: 98 MMOL/L (ref 98–107)
CO2 SERPL-SCNC: 29 MMOL/L (ref 22–30)
CREAT SERPL-MCNC: 1.2 MG/DL (ref 0.7–1.3)
GFR SERPL CREATININE-BSD FRML MDRD: 61 ML/MIN/1.73M2
GLUCOSE SERPL-MCNC: 120 MG/DL (ref 70–99)
POTASSIUM SERPL-SCNC: 3.7 MMOL/L (ref 3.5–5.1)
SODIUM SERPL-SCNC: 142 MMOL/L (ref 135–145)

## 2018-06-01 ENCOUNTER — TELEPHONE (OUTPATIENT)
Dept: CARDIOLOGY | Facility: CLINIC | Age: 63
End: 2018-06-01

## 2018-06-01 ENCOUNTER — CARE COORDINATION (OUTPATIENT)
Dept: CARDIOLOGY | Facility: CLINIC | Age: 63
End: 2018-06-01

## 2018-06-01 NOTE — TELEPHONE ENCOUNTER
"Called Danielito back. He states not able to check his BP at home. He denies dizziness/lightheadedness.   He does state he is not feeling well. He has lack of energy. His weight in the last 5 days has gone from his baseline 211 up to 216 lbs.  He has increased SOB and can't walk very far. His legs have increased swelling and says he tends to retain fluid in his \"chest cavity\" and can feel it in there. Also states that he has a pulse ox at home and he's noted a few times that when he's sitting in his chair he'll check his O2 and will be in low 80s. He takes a couple deep breaths and it comes up to 95-96. This has been happening intermittently for 3-4 weeks. He normally takes 20 mg Torsemide daily, today he decided to take 40 mg daily. When he was in [place] a few weeks ago and felt fluid overloaded we had increased to 40 mg daily at that time and it helped. He is coming here on 6/11 for RHC and CPX. He also wants to see provider that day so added on to Kylie Faye's schedule.  Told him I would review this information with Deysi Mattson and call him back.   Cecelia Palacios RN   "

## 2018-06-01 NOTE — TELEPHONE ENCOUNTER
Called Danielito back to give following order per Deysi:  Date: 6/1/2018    Time of Call: 3:03 PM     Diagnosis:  Heart failure     [ VORB ] Ordering provider: Deysi Mattson NP  Order: Increase Torsemide to 40 mg daily through weekend.     Order received by: Cecelia Palacios rn      Follow-up/additional notes: communicated this to Danielito who states understanding. I will call him to check in on Monday.   Cecelia Palacios RN

## 2018-06-01 NOTE — PROGRESS NOTES
Attempted to call patient to check on blood pressures since starting Entresto to possibly titrate up. Patient not answering and voicemail full. Will continue to try. If calls in would like to obtain BP log.   Cecelia Palacios RN

## 2018-06-01 NOTE — TELEPHONE ENCOUNTER
[facility] Call Center    Phone Message    May a detailed message be left on voicemail: yes    Reason for Call: Other: Pt calling asking if we could fax his disability form again. It was faxed on 5/18 and scanned in pts chart. Please resend to fax number on form.     Action Taken: Message routed to:  [facility]

## 2018-06-01 NOTE — TELEPHONE ENCOUNTER
[facility] Call Center    Phone Message    May a detailed message be left on voicemail: yes    Reason for Call: Other: Pt returning Nurse Vanessa's call - said he has not been taking his Blood Pressure because his machine hasn't been working - Pt requests call back to discuss that - Also Pt wants you to know he has been retaining water - Also Pt wants test results and wants to know if his Kidney Function is okay? - Pt has been gaining weight - Pt said doesn't want to take anymore of Torsemide until he gets his results back from his test. Please return Pt call - Thanks!     Action Taken: Message routed to:  [facility] Cardiology Clinic

## 2018-06-04 NOTE — TELEPHONE ENCOUNTER
Called Danielito back to communicate the following order:  Date: 6/4/2018    Time of Call: 4:20 PM     Diagnosis:  Heart failure     [ VORB ] Ordering provider: Deysi Mattson NP  Order: Decrease Torsemide back to 20 mg daily. Also please see PCP asap regarding oxygen desaturation.      Order received by: Cecelia Palacios RN      Follow-up/additional notes: communicated this with Danielito who states understanding.

## 2018-06-04 NOTE — TELEPHONE ENCOUNTER
Called Danielito to see if he lost weight over weekend. He states he's lost a bit, he's down to 213 lb. No change in his breathing, still has increased BAJWA. Took the increased dose of 40 mg Torsemide today.   His other concern is his Oxygen levels. He states at times still his O2 will be in the low 80s. Recovers with deep breaths. He used to be on O2 at home but is not anymore. He thinks he needs it. Wonders if he needs another sleep study or if he can qualify with a 6 minute walk.     Told him I would review that with Deysi and let him know. I also told him another option for him would be to follow up with his PCP regarding this as they are local to him.    Cecelia Palacios RN

## 2018-06-04 NOTE — TELEPHONE ENCOUNTER
Re-faxed Attending Physician Statement to [employer] at [phone].  Called and spoke with Pt and informed him his request was completed.    Luma Lawrence LPN

## 2018-06-08 ENCOUNTER — PRE VISIT (OUTPATIENT)
Dept: CARDIOLOGY | Facility: CLINIC | Age: 63
End: 2018-06-08

## 2018-06-08 DIAGNOSIS — I50.21 ACUTE SYSTOLIC CONGESTIVE HEART FAILURE (H): Primary | ICD-10-CM

## 2018-06-11 ENCOUNTER — APPOINTMENT (OUTPATIENT)
Dept: LAB | Facility: CLINIC | Age: 63
DRG: 287 | End: 2018-06-11
Attending: NURSE PRACTITIONER
Payer: COMMERCIAL

## 2018-06-11 ENCOUNTER — APPOINTMENT (OUTPATIENT)
Dept: MEDSURG UNIT | Facility: CLINIC | Age: 63
DRG: 287 | End: 2018-06-11
Attending: NURSE PRACTITIONER
Payer: COMMERCIAL

## 2018-06-11 ENCOUNTER — HOSPITAL ENCOUNTER (INPATIENT)
Facility: CLINIC | Age: 63
LOS: 4 days | Discharge: HOME | DRG: 287 | End: 2018-06-15
Attending: INTERNAL MEDICINE | Admitting: INTERNAL MEDICINE
Payer: COMMERCIAL

## 2018-06-11 ENCOUNTER — TRANSFERRED RECORDS (OUTPATIENT)
Dept: HEALTH INFORMATION MANAGEMENT | Facility: CLINIC | Age: 63
End: 2018-06-11

## 2018-06-11 ENCOUNTER — HOSPITAL ENCOUNTER (OUTPATIENT)
Dept: CARDIOLOGY | Facility: CLINIC | Age: 63
Discharge: HOME | End: 2018-06-11
Attending: NURSE PRACTITIONER | Admitting: NURSE PRACTITIONER
Payer: COMMERCIAL

## 2018-06-11 ENCOUNTER — APPOINTMENT (OUTPATIENT)
Dept: CARDIOLOGY | Facility: CLINIC | Age: 63
DRG: 287 | End: 2018-06-11
Attending: NURSE PRACTITIONER
Payer: COMMERCIAL

## 2018-06-11 DIAGNOSIS — I50.21 ACUTE SYSTOLIC CONGESTIVE HEART FAILURE (H): ICD-10-CM

## 2018-06-11 PROBLEM — I50.23 ACUTE ON CHRONIC SYSTOLIC CHF (CONGESTIVE HEART FAILURE) (H): Status: ACTIVE | Noted: 2018-06-11

## 2018-06-11 LAB
ALBUMIN SERPL-MCNC: 3.4 G/DL (ref 3.4–5)
ALP SERPL-CCNC: 86 U/L (ref 40–150)
ALT SERPL W P-5'-P-CCNC: 13 U/L (ref 0–70)
ANION GAP SERPL CALCULATED.3IONS-SCNC: 6 MMOL/L (ref 3–14)
AST SERPL W P-5'-P-CCNC: 18 U/L (ref 0–45)
BASOPHILS # BLD AUTO: 0 10E9/L (ref 0–0.2)
BASOPHILS NFR BLD AUTO: 0.5 %
BILIRUB SERPL-MCNC: 3.1 MG/DL (ref 0.2–1.3)
BUN SERPL-MCNC: 22 MG/DL (ref 7–30)
CALCIUM SERPL-MCNC: 8.8 MG/DL (ref 8.5–10.1)
CHLORIDE SERPL-SCNC: 99 MMOL/L (ref 94–109)
CO2 SERPL-SCNC: 30 MMOL/L (ref 20–32)
CREAT SERPL-MCNC: 1.13 MG/DL (ref 0.66–1.25)
DIFFERENTIAL METHOD BLD: ABNORMAL
EOSINOPHIL # BLD AUTO: 0 10E9/L (ref 0–0.7)
EOSINOPHIL NFR BLD AUTO: 0.5 %
ERYTHROCYTE [DISTWIDTH] IN BLOOD BY AUTOMATED COUNT: 18.3 % (ref 10–15)
GFR SERPL CREATININE-BSD FRML MDRD: 66 ML/MIN/1.7M2
GLUCOSE SERPL-MCNC: 190 MG/DL (ref 70–99)
HBA1C MFR BLD: 6.8 % (ref 0–5.6)
HCT VFR BLD AUTO: 47 % (ref 40–53)
HGB BLD-MCNC: 15.2 G/DL (ref 13.3–17.7)
IMM GRANULOCYTES # BLD: 0 10E9/L (ref 0–0.4)
IMM GRANULOCYTES NFR BLD: 0.5 %
LYMPHOCYTES # BLD AUTO: 1.5 10E9/L (ref 0.8–5.3)
LYMPHOCYTES NFR BLD AUTO: 23.4 %
MAGNESIUM SERPL-MCNC: 2.1 MG/DL (ref 1.6–2.3)
MCH RBC QN AUTO: 30 PG (ref 26.5–33)
MCHC RBC AUTO-ENTMCNC: 32.3 G/DL (ref 31.5–36.5)
MCV RBC AUTO: 93 FL (ref 78–100)
MONOCYTES # BLD AUTO: 0.6 10E9/L (ref 0–1.3)
MONOCYTES NFR BLD AUTO: 9.3 %
NEUTROPHILS # BLD AUTO: 4.1 10E9/L (ref 1.6–8.3)
NEUTROPHILS NFR BLD AUTO: 65.8 %
NRBC # BLD AUTO: 0 10*3/UL
NRBC BLD AUTO-RTO: 0 /100
NT-PROBNP SERPL-MCNC: 3382 PG/ML (ref 0–900)
PLATELET # BLD AUTO: 189 10E9/L (ref 150–450)
POTASSIUM SERPL-SCNC: 3.9 MMOL/L (ref 3.4–5.3)
PROT SERPL-MCNC: 7.5 G/DL (ref 6.8–8.8)
RBC # BLD AUTO: 5.06 10E12/L (ref 4.4–5.9)
SODIUM SERPL-SCNC: 136 MMOL/L (ref 133–144)
WBC # BLD AUTO: 6.2 10E9/L (ref 4–11)

## 2018-06-11 PROCEDURE — 25000125 ZZHC RX 250: Performed by: INTERNAL MEDICINE

## 2018-06-11 PROCEDURE — 27210982 ZZH KIT RT HC TOTES DISP CR7

## 2018-06-11 PROCEDURE — 40000275 ZZH STATISTIC RCP TIME EA 10 MIN

## 2018-06-11 PROCEDURE — 36415 COLL VENOUS BLD VENIPUNCTURE: CPT | Performed by: INTERNAL MEDICINE

## 2018-06-11 PROCEDURE — 93018 CV STRESS TEST I&R ONLY: CPT | Performed by: INTERNAL MEDICINE

## 2018-06-11 PROCEDURE — 93451 RIGHT HEART CATH: CPT | Mod: 26 | Performed by: INTERNAL MEDICINE

## 2018-06-11 PROCEDURE — 99223 1ST HOSP IP/OBS HIGH 75: CPT | Mod: 25 | Performed by: INTERNAL MEDICINE

## 2018-06-11 PROCEDURE — 25000132 ZZH RX MED GY IP 250 OP 250 PS 637: Performed by: INTERNAL MEDICINE

## 2018-06-11 PROCEDURE — 93451 RIGHT HEART CATH: CPT

## 2018-06-11 PROCEDURE — 93010 ELECTROCARDIOGRAM REPORT: CPT | Performed by: INTERNAL MEDICINE

## 2018-06-11 PROCEDURE — 83880 ASSAY OF NATRIURETIC PEPTIDE: CPT | Performed by: INTERNAL MEDICINE

## 2018-06-11 PROCEDURE — 93005 ELECTROCARDIOGRAM TRACING: CPT

## 2018-06-11 PROCEDURE — 85025 COMPLETE CBC W/AUTO DIFF WBC: CPT | Performed by: INTERNAL MEDICINE

## 2018-06-11 PROCEDURE — 4A023N6 MEASUREMENT OF CARDIAC SAMPLING AND PRESSURE, RIGHT HEART, PERCUTANEOUS APPROACH: ICD-10-PCS | Performed by: INTERNAL MEDICINE

## 2018-06-11 PROCEDURE — 93016 CV STRESS TEST SUPVJ ONLY: CPT | Performed by: INTERNAL MEDICINE

## 2018-06-11 PROCEDURE — 25000125 ZZHC RX 250: Performed by: NURSE PRACTITIONER

## 2018-06-11 PROCEDURE — 40000141 ZZH STATISTIC PERIPHERAL IV START W/O US GUIDANCE

## 2018-06-11 PROCEDURE — 80053 COMPREHEN METABOLIC PANEL: CPT | Performed by: INTERNAL MEDICINE

## 2018-06-11 PROCEDURE — 27210807 ZZH SHEATH CR6

## 2018-06-11 PROCEDURE — 40000172 ZZH STATISTIC PROCEDURE PREP ONLY

## 2018-06-11 PROCEDURE — 25000132 ZZH RX MED GY IP 250 OP 250 PS 637

## 2018-06-11 PROCEDURE — 27211181 ZZH BALLOON TIP PRESSURE CR5

## 2018-06-11 PROCEDURE — 83735 ASSAY OF MAGNESIUM: CPT | Performed by: INTERNAL MEDICINE

## 2018-06-11 PROCEDURE — 25000128 H RX IP 250 OP 636: Performed by: INTERNAL MEDICINE

## 2018-06-11 PROCEDURE — 83036 HEMOGLOBIN GLYCOSYLATED A1C: CPT | Performed by: INTERNAL MEDICINE

## 2018-06-11 PROCEDURE — 94621 CARDIOPULM EXERCISE TESTING: CPT | Performed by: NURSE PRACTITIONER

## 2018-06-11 PROCEDURE — 27210787 ZZH MANIFOLD CR2

## 2018-06-11 PROCEDURE — 21400006 ZZH R&B CCU INTERMEDIATE UMMC

## 2018-06-11 RX ORDER — ISOSORBIDE DINITRATE 10 MG/1
10 TABLET ORAL 3 TIMES DAILY
Status: DISCONTINUED | OUTPATIENT
Start: 2018-06-11 | End: 2018-06-15 | Stop reason: HOSPADM

## 2018-06-11 RX ORDER — LIDOCAINE 40 MG/G
CREAM TOPICAL
Status: COMPLETED | OUTPATIENT
Start: 2018-06-11 | End: 2018-06-11

## 2018-06-11 RX ORDER — DIGOXIN 125 MCG
250 TABLET ORAL DAILY
Status: DISCONTINUED | OUTPATIENT
Start: 2018-06-11 | End: 2018-06-15 | Stop reason: HOSPADM

## 2018-06-11 RX ORDER — LIDOCAINE 40 MG/G
CREAM TOPICAL
Status: DISCONTINUED | OUTPATIENT
Start: 2018-06-11 | End: 2018-06-15 | Stop reason: HOSPADM

## 2018-06-11 RX ORDER — BUMETANIDE 0.25 MG/ML
2 INJECTION INTRAMUSCULAR; INTRAVENOUS ONCE
Status: COMPLETED | OUTPATIENT
Start: 2018-06-11 | End: 2018-06-11

## 2018-06-11 RX ORDER — HYDRALAZINE HYDROCHLORIDE 10 MG/1
10 TABLET, FILM COATED ORAL 3 TIMES DAILY
Status: DISCONTINUED | OUTPATIENT
Start: 2018-06-11 | End: 2018-06-15 | Stop reason: HOSPADM

## 2018-06-11 RX ORDER — ATORVASTATIN CALCIUM 10 MG/1
10 TABLET, FILM COATED ORAL DAILY
Status: DISCONTINUED | OUTPATIENT
Start: 2018-06-11 | End: 2018-06-11

## 2018-06-11 RX ORDER — ATORVASTATIN CALCIUM 10 MG/1
10 TABLET, FILM COATED ORAL
Status: DISCONTINUED | OUTPATIENT
Start: 2018-06-11 | End: 2018-06-15 | Stop reason: HOSPADM

## 2018-06-11 RX ORDER — ASPIRIN 81 MG/1
81 TABLET ORAL DAILY
Status: DISCONTINUED | OUTPATIENT
Start: 2018-06-11 | End: 2018-06-15 | Stop reason: HOSPADM

## 2018-06-11 RX ORDER — TRAZODONE HYDROCHLORIDE 100 MG/1
100 TABLET ORAL
Status: DISCONTINUED | OUTPATIENT
Start: 2018-06-11 | End: 2018-06-15 | Stop reason: HOSPADM

## 2018-06-11 RX ORDER — HEPARIN SODIUM 5000 [USP'U]/.5ML
5000 INJECTION, SOLUTION INTRAVENOUS; SUBCUTANEOUS EVERY 8 HOURS
Status: DISCONTINUED | OUTPATIENT
Start: 2018-06-11 | End: 2018-06-14

## 2018-06-11 RX ADMIN — BUMETANIDE 1 MG/HR: 0.25 INJECTION INTRAMUSCULAR; INTRAVENOUS at 21:34

## 2018-06-11 RX ADMIN — HEPARIN SODIUM 5000 UNITS: 5000 INJECTION, SOLUTION INTRAVENOUS; SUBCUTANEOUS at 21:39

## 2018-06-11 RX ADMIN — BUMETANIDE 2 MG: 0.25 INJECTION INTRAMUSCULAR; INTRAVENOUS at 21:30

## 2018-06-11 RX ADMIN — LIDOCAINE: 40 CREAM TOPICAL at 10:38

## 2018-06-11 RX ADMIN — SACUBITRIL AND VALSARTAN 1 TABLET: 24; 26 TABLET, FILM COATED ORAL at 21:28

## 2018-06-11 RX ADMIN — ASPIRIN 81 MG: 81 TABLET, COATED ORAL at 21:42

## 2018-06-11 RX ADMIN — ATORVASTATIN CALCIUM 10 MG: 10 TABLET, FILM COATED ORAL at 21:29

## 2018-06-11 RX ADMIN — DIGOXIN 250 MCG: 125 TABLET ORAL at 21:42

## 2018-06-11 ASSESSMENT — ACTIVITIES OF DAILY LIVING (ADL)
AMBULATION: 0 - INDEPENDENT
BATHING: 0 - INDEPENDENT
TOILETING: 0 - INDEPENDENT
EATING: 0 - INDEPENDENT
TRANSFERRING: 0 - INDEPENDENT
DRESS: 0 - INDEPENDENT
COMMUNICATION: 0 - UNDERSTANDS/COMMUNICATES WITHOUT DIFFICULTY
SWALLOWING: 0 - SWALLOWS FOODS/LIQUIDS WITHOUT DIFFICULTY

## 2018-06-11 NOTE — PROCEDURES
[facility]  CARDIOLOGY   Right Heart Catheterization   June 11, 2018    Attending Cardiology Staff: Romaine Mondragon MD, PhD  Cardiology Fellow: Kunal Sampson MD    History: 63 year old male with systolic heart failure, hypertension, diabetes, hyperlipidemia, NICM, SHIRLEY, who is here for hemodynamic evaluation with right heart catheterization.    After informing the benefits and risks, an informed consent was obtained. Right neck was prepped and draped in the usual sterile fashion and infiltrated with a 2% lidocaine. 7 Fr sheath was placed in the right internal jugular vein using modified Seldinger technique over a micropuncture wire with ultrasound guidance. RHC was performed using a 7 Fr. Roxton Soy catheter. Intracardiac pressures, oxygen saturation, and cardiac output were obtained.      Results (pressures in mmHg)  RA: 26/32/20  RV 45/20  PA 45/25/32;  PA Sat 48.4%  PCWP 30/37/25;  PCW Sat 96.8%  Timmy CO/CI 2.8/1.3 L/min; TD CO 3.0/1.4 L/min  PVR 2.5 bonilla; TPR 11.4 bonilla  Hb 14.5 g/dL  NIBP 108/80/90  SVR 1860 dynes*sec/cm^5    Complications: None   Blood loss: Minimal blood loss    Conclusions:  1. Severely elevated right and moderately elevated left sided filling pressures.  2. Mildly elevated pulmonary artery pressures.  3. Moderately reduced cardiac output.    Recommendations:  1. Continued medical management for cardiovascular risk factor optimization.  2. Results have been communicated to referring provider. Plan per primary provider.      Kunal Sampson MD  Advanced Heart Failure/Heart Transplant Fellow  [facility] Division of Cardiology   [phone]    I was present in the cath lab for the entire procedure and agree with the assessment and plan as documented in the note.  Romaine Mondragon MD

## 2018-06-11 NOTE — IP AVS SNAPSHOT
MRN:[id number]                      After Visit Summary   6/11/2018    Danielito Chu    MRN: [id number]           Visit Information        Department      6/11/2018  9:08 AM [address] [facility]          Review of your medicines      UNREVIEWED medicines. Ask your doctor about these medicines        Dose / Directions    aspirin 81 MG EC tablet   Used for:  Acute systolic congestive heart failure (H)        Dose:  81 mg   Take 1 tablet (81 mg) by mouth daily   Quantity:  30 tablet   Refills:  3       atorvastatin 10 MG tablet   Commonly known as:  LIPITOR   Used for:  Acute systolic congestive heart failure (H)        TAKE 1 TABLET BY MOUTH EVERY DAY   Quantity:  90 tablet   Refills:  3       digoxin 250 MCG tablet   Commonly known as:  LANOXIN   Used for:  Acute on chronic systolic heart failure (H)        TAKE 1 TABLET (250 MCG) BY MOUTH DAILY   Quantity:  90 tablet   Refills:  3       metFORMIN 500 MG tablet   Commonly known as:  GLUCOPHAGE   Used for:  Chronic systolic heart failure (H), Acute systolic congestive heart failure (H)        Dose:  500 mg   Take 1 tablet (500 mg) by mouth 2 times daily (with meals)   Refills:  0       metoprolol succinate 25 MG 24 hr tablet   Commonly known as:  TOPROL-XL   Used for:  Acute on chronic systolic heart failure (H)        Dose:  25 mg   Take 1 tablet (25 mg) by mouth daily   Quantity:  90 tablet   Refills:  3       sacubitril-valsartan 24-26 MG per tablet   Commonly known as:  ENTRESTO        Dose:  1 tablet   Take 1 tablet by mouth 2 times daily   Refills:  0       sildenafil 50 MG tablet   Commonly known as:  VIAGRA   Used for:  Drug-induced impotence        Dose:  50 mg   Take 1 tablet (50 mg) by mouth daily as needed   Quantity:  20 tablet   Refills:  1       torsemide 20 MG tablet   Commonly known as:  DEMADEX   Used for:  Acute systolic congestive heart failure (H), Nonischemic cardiomyopathy (H)        Dose:  20 mg   Take 1 tablet (20  mg) by mouth daily   Quantity:  180 tablet   Refills:  1       traZODone 100 MG tablet   Commonly known as:  DESYREL   Used for:  Insomnia, unspecified type        Dose:  100 mg   Take 1 tablet (100 mg) by mouth nightly as needed for sleep   Quantity:  90 tablet   Refills:  1                Protect others around you: Learn how to safely use, store and throw away your medicines at www.disposemymeds.org.         Follow-ups after your visit        Your next 10 appointments already scheduled     Jun 11, 2018  5:30 PM CDT   (Arrive by 5:15 PM)   CORE RETURN with Kylie Faye NP   [facility] ([facility])    [address] [place] [address]  [address] [place] [address]   [phone]            Jul 19, 2018  2:00 PM CDT   Lab with [facility]   [facility] ([facility])    [address] [place] [address]  1st Floor  [facility] [address]   [phone]            Jul 19, 2018  2:30 PM CDT   (Arrive by 2:15 PM)   Implanted Defibulator with Uc Cv Device 1   [facility])    [address] [facility]  [address] [place] [address]   [phone]            Jul 19, 2018  3:00 PM CDT   (Arrive by 2:45 PM)   RETURN HEART FAILURE with Lorena Watkins MD   [facility] ([facility])    [address] [place] [address]  [address] [place] [address]   [phone]               Care Instructions        Further instructions from your care team       [facility]                        Interventional Cardiology  Discharge Instructions   Post Right Heart Cath      AFTER YOU GO HOME:    DO drink plenty of fluids    DO resume your regular diet and medications unless otherwise instructed by your Primary Physician    Do Not scrub the procedure site vigorously    No lotion or powder to the puncture site for 3 days    CALL YOUR PRIMARY PHYSICIAN IF: You may resume all normal activity.  Monitor  "neck site for bleeding, swelling, or voice changes. If you notice bleeding or swelling immediately apply pressure to the site and call number below to speak with Cardiology Fellow.  If you experience any changes in your breathing you should call your doctor immediately or come to the closest Emergency Department.  Do not drive yourself.    ADDITIONAL INSTRUCTIONS: Medications: You are to resume all home medications including anticoagulation therapy unless otherwise advised by your primary cardiologist or nurse coordinator.    Follow Up: Per your primary cardiology team    If you have any questions or concerns regarding your procedure site please call [phone] at anytime and ask for Cardiology Fellow on call.  They are available 24 hours a day.  You may also contact the Cardiology Clinic after hours number at [phone].                                                       Telephone Numbers [phone] Monday-Friday 8:00 am to 4:30 pm    [phone] After 4:30 pm Monday-Friday, Weekends & Holidays  Ask for Interventional Cardiologist on call. Someone is on call 24 hours/day   [facility] toll free number [phone] Monday-Friday 8:00 am to 4:30 pm   [facility] Emergency Dept [phone]                    Additional Information About Your Visit        Care EveryWhere ID     This is your Care EveryWhere ID. This could be used by other organizations to access your [place] medical records  [id number]        Your Vitals Were     Blood Pressure Pulse Temperature Respirations Height Weight    97/67 (BP Location: Right arm) 87 97.5  F (36.4  C) (Oral) 24 1.753 m (5' 9\") 97.5 kg (215 lb)    Pulse Oximetry BMI (Body Mass Index)                97% 31.75 kg/m2           Primary Care Provider Office Phone # Fax #    Bashir Hitobi [phone]      Equal Access to Services     ALEX RODGERS AH: Eddie Tsai, daphne pena, sandip leary, aaron lea " richi langstonaan ah. So [facility] [phone].    ATENCIÓN: Si bret luna, tiene a stokes disposición servicios gratuitos de asistencia lingüística. Kam al [phone].    We comply with applicable federal civil rights laws and [place] laws. We do not discriminate on the basis of race, color, national origin, age, disability, sex, sexual orientation, or gender identity.            Thank you!     Thank you for choosing [facility] for your care. Our goal is always to provide you with excellent care. Hearing back from our patients is one way we can continue to improve our services. Please take a few minutes to complete the written survey that you may receive in the mail after you visit with us. Thank you!             Medication List: This is a list of all your medications and when to take them. Check marks below indicate your daily home schedule. Keep this list as a reference.      Medications           Morning Afternoon Evening Bedtime As Needed    aspirin 81 MG EC tablet   Take 1 tablet (81 mg) by mouth daily                                atorvastatin 10 MG tablet   Commonly known as:  LIPITOR   TAKE 1 TABLET BY MOUTH EVERY DAY                                digoxin 250 MCG tablet   Commonly known as:  LANOXIN   TAKE 1 TABLET (250 MCG) BY MOUTH DAILY                                metFORMIN 500 MG tablet   Commonly known as:  GLUCOPHAGE   Take 1 tablet (500 mg) by mouth 2 times daily (with meals)                                metoprolol succinate 25 MG 24 hr tablet   Commonly known as:  TOPROL-XL   Take 1 tablet (25 mg) by mouth daily                                sacubitril-valsartan 24-26 MG per tablet   Commonly known as:  ENTRESTO   Take 1 tablet by mouth 2 times daily                                sildenafil 50 MG tablet   Commonly known as:  VIAGRA   Take 1 tablet (50 mg) by mouth daily as needed                                torsemide 20 MG tablet   Commonly known as:  DEMADEX   Take 1 tablet (20 mg) by mouth  daily                                traZODone 100 MG tablet   Commonly known as:  DESYREL   Take 1 tablet (100 mg) by mouth nightly as needed for sleep

## 2018-06-11 NOTE — PROGRESS NOTES
D: Pt arrived in cath lab for Right Heart Catheterization  I: Right heart catheterization completed per MD.  7fr sheath removed from RIJ site, manual pressure applied until hemostasis achieved.  A: RIJsite clean, dry and intact. Soft, no hematoma.  P: Pt to transfer to  for discharge.  Pt educated on watching neck site for bleeding, hematoma, pressure on airway and voice changes.

## 2018-06-11 NOTE — IP AVS SNAPSHOT
MRN:[id number]                      After Visit Summary   6/11/2018    Danielito Chu    MRN: [id number]           Thank you!     Thank you for choosing [facility] for your care. Our goal is always to provide you with excellent care. Hearing back from our patients is one way we can continue to improve our services. Please take a few minutes to complete the written survey that you may receive in the mail after you visit with us. Thank you!        Patient Information     Date Of Birth          1955        Designated Caregiver       Most Recent Value    Caregiver    Will someone help with your care after discharge? yes    Name of designated caregiver Brenden Chu    Phone number of caregiver     Caregiver address [place]      About your hospital stay     You were admitted on:  June 11, 2018 You last received care in the:  [address] [facility]    You were discharged on:  Sharon 15, 2018        Reason for your hospital stay       You were hospitalized for heart failure                  Who to Call     For medical emergencies, please call 911.  For non-urgent questions about your medical care, please call your primary care provider or clinic, [phone]          Attending Provider     Provider Specialty    Carlos Manuel Ruiz MD Cardiology    Otway, Lorena Lindsey MD Cardiology       Primary Care Provider Office Phone # Fax #    Bashir Hines [phone]       When to contact your care team       If chest pain or worsening shortness of breath and/or lower extremity swelling and/or weight gain that is not controlled by water pills, please call your cardiologist or seek medical help                  After Care Instructions     Activity       Your activity upon discharge: activity as tolerated            Diet       Follow this diet upon discharge: Orders Placed This Encounter      Fluid restriction 2000 ML FLUID      Low Saturated Fat Na <2400 mg             Monitor and record       Daily weights                  Follow-up Appointments     Follow Up and recommended labs and tests       Follow up with your cardiologist in one week with BMP.                  Your next 10 appointments already scheduled     Jun 25, 2018 11:30 AM CDT   Lab with UC LAB   [facility] ([facility])    [address] [place] [address]   [phone]            Jun 25, 2018 12:00 PM CDT   (Arrive by 11:45 AM)   CORE RETURN with JOSE A Ferrell CNP   [facility] ([facility])    [address] [place] [address]  [address] [place] [address]   [phone]            Jul 19, 2018  2:00 PM CDT   Lab with UC LAB   [facility] ([facility])    [address] [place] [address]   [phone]            Jul 19, 2018  2:30 PM CDT   (Arrive by 2:15 PM)   Implanted Defibulator with Uc Cv Device 1   [facility] ([facility])    [address] [place] [address]  [address] [place] [address]   [phone]            Jul 19, 2018  3:00 PM CDT   (Arrive by 2:45 PM)   RETURN HEART FAILURE with Lorena Watkins MD   [facility])    [address] [place] [address]  [address] [place] [id number] [phone]              Additional Services     CARDIAC REHAB REFERRAL       *This therapy referral will be filtered to a centralized scheduling office at [facility] and the patient will receive a call to schedule an appointment at a [place] location most convenient for them.*     PREFERRED LOCATION: [address] [address]  Phone: [phone]    If you have not heard from the scheduling office within 2 business days, please call [phone] for all locations, with the exception of [place], please call [phone].    Please be aware  "that coverage of these services is subject to the terms and limitations of your health insurance plan.  Call member services at your health plan with any benefit or coverage questions.      **Note to Provider:  If you are referring outside of [facility] for the therapy appointment, please list the name of the location in the \"special instructions\" above, print the referral and give to the patient to schedule the appointment.                  Further instructions from your care team       [facility]                        Interventional Cardiology  Discharge Instructions   Post Right Heart Cath      AFTER YOU GO HOME:    DO drink plenty of fluids    DO resume your regular diet and medications unless otherwise instructed by your Primary Physician    Do Not scrub the procedure site vigorously    No lotion or powder to the puncture site for 3 days    CALL YOUR PRIMARY PHYSICIAN IF: You may resume all normal activity.  Monitor neck site for bleeding, swelling, or voice changes. If you notice bleeding or swelling immediately apply pressure to the site and call number below to speak with Cardiology Fellow.  If you experience any changes in your breathing you should call your doctor immediately or come to the closest Emergency Department.  Do not drive yourself.    ADDITIONAL INSTRUCTIONS: Medications: You are to resume all home medications including anticoagulation therapy unless otherwise advised by your primary cardiologist or nurse coordinator.    Follow Up: Per your primary cardiology team    If you have any questions or concerns regarding your procedure site please call [phone] at anytime and ask for Cardiology Fellow on call.  They are available 24 hours a day.  You may also contact the Cardiology Clinic after hours number at [phone].                                                       Telephone Numbers [phone] Monday-Friday 8:00 am to 4:30 pm    [phone] After " 4:30 pm Monday-Friday, Weekends & Holidays  Ask for Interventional Cardiologist on call. Someone is on call 24 hours/day   [facility] toll free number [phone] Monday-Friday 8:00 am to 4:30 pm   [facility] Emergency Dept [phone]                   General Recommendations To Control Heart Failure When You Get Home       Heart Failure Instructions for Patients and Families: Please read and check off each of these important instructions as you do them when you get home.          Weight and Symptoms       ___ Put a scale in your bathroom.    ___ Post a weight chart or calendar next to your scale.    ___ Weigh yourself everyday as soon as you get up in the morning (before breakfast).  You should only be wearing your pajamas.  Write your weight on the chart/calendar.  ___ Bring your weight chart/calendar with you to all appointments.  ___ Call your doctor or nurse practitioner if you gain 2 pounds (in 1 day) or 5 pounds in (1 week) from your goal  good  weight.  Your good weight is also called your  dry  weight.  Your doctor or nurse will tell you what your good weight should be.    ___ Call your doctor or nurse practitioner if you have shortness of breath that gets worse over time, leg swelling or fatigue.       Medications and Diet       ___ Make sure to take your medication as prescribed.    ___ Bring a current list of your medication and all of your medicine bottles with you to all appointments.    ___ Limit fluids if you still have swelling or shortness of breath, or if your doctor tells you to do so.    ___ Eat less than 2000 mg of sodium (salt) every day. Read food labels, and do not add salt to meals. Remember, if you eat less salt you retain less fluid.  ___ Follow a heart healthy diet that is low in saturated fat.        Activity and Suggested Lifestyle Changes      ___ Stay active. Talk to your doctor about an exercise program that is safe for your heart.  ___ Stop smoking. Reduce alcohol use.      ___ Lose  weight if you are overweight. Extra weight puts a lot of stress on the heart.                 Control for Leg Swelling     ___ Keep your legs elevated (raised) as needed for swelling. If swelling is uncomfortable or elevation doesn t help, ask your doctor about using ACE wraps or support stockings.        What is the [facility] Clinic?  Cardiomyopathy  Optimization  Rehabilitation  Education    The [facility] Clinic is a heart failure specialty clinic within [facility].  It is an outpatient disease management program that is based on a phase-by-phase approach, which is tailored to each patient s individual needs.  The cardiologist, nurse practitioner, physician assistant and nurses provide an ongoing outpatient care and treatment plan that guides heart failure and cardiomyopathy patients from evaluation and education to stabilization. This team works with your current primary care doctor and cardiologist to help you:    - Avoid hospitalizations  - Slow the progression of the disease  - Improve length and quality of life  - Know who and when to call if heart failure symptoms appear  - Receive easy access to quality health care and advice  - Better understand your condition and treatment  - Decrease the tremendous cost burden of heart failure care  - Detect future heart problems before they become life threatening                                 Follow-up Appointments     ___ An appointment with the [facility] may already have been made for you (see section   Your next appointments already scheduled ).  If you have a question about your appointment, would like to speak with a C.O.R.E. nurse, or would like to become a [facility] Clinic patient, please call one of the following locations:     - [facility]):                                             [phone]  - [facility]):                                            [phone]  - [facility]  "[facility]                 [phone]      ___ Your C.O.R.E. Clinic Team will continue to educate you on your heart failure and may adjust medications based on your vital signs, lab work, and how you are feeling.  Therefore, it is very important to bring the following to all C.O.R.E. appointments:    - An accurate list of your medications  - Your medicine bottles  - Your weight chart/calendar                   Pending Results     No orders found from 6/9/2018 to 6/12/2018.            Statement of Approval     Ordered          06/15/18 1245  I have reviewed and agree with all the recommendations and orders detailed in this document.  EFFECTIVE NOW     Approved and electronically signed by:  Luis Manuel Castano MD             Admission Information     Date & Time Provider Department Dept. Phone    6/11/2018 Lorena Watkins MD [address] [facility] [phone]      Your Vitals Were     Blood Pressure Pulse Temperature Respirations Height Weight    92/61 (BP Location: Right arm) 101 97.5  F (36.4  C) (Oral) 18 1.753 m (5' 9\") 91.4 kg (201 lb 9.6 oz)    Pulse Oximetry BMI (Body Mass Index)                95% 29.77 kg/m2          Care EveryWhere ID     This is your Care EveryWhere ID. This could be used by other organizations to access your [facility] [place] medical records  [id number]        Equal Access to Services     ALEX RODGERS AH: Hadii aad ku hadasho Sojeanieali, waaxda luqadaha, qaybta kaalmada gibran, aaron pearson. So [facility] [phone].    ATENCIÓN: Si habla español, tiene a stokes disposición servicios gratuitos de asistencia lingüística. Llame al [phone].    We comply with applicable federal civil rights laws and [place] laws. We do not discriminate on the basis of race, color, national origin, age, disability, sex, sexual orientation, or gender identity.               Review of your medicines      START taking        Dose / Directions    potassium chloride " SA 20 MEQ CR tablet   Commonly known as:  K-DUR/KLOR-CON M   Used for:  Acute systolic congestive heart failure (H)        Dose:  20 mEq   Take 1 tablet (20 mEq) by mouth daily   Quantity:  90 tablet   Refills:  3         CONTINUE these medicines which may have CHANGED, or have new prescriptions. If we are uncertain of the size of tablets/capsules you have at home, strength may be listed as something that might have changed.        Dose / Directions    * torsemide 20 MG tablet   Commonly known as:  DEMADEX   This may have changed:  when to take this   Used for:  Acute systolic congestive heart failure (H)        Dose:  20 mg   Take 1 tablet (20 mg) by mouth every evening   Quantity:  30 tablet   Refills:  3       * torsemide 20 MG tablet   Commonly known as:  DEMADEX   This may have changed:  You were already taking a medication with the same name, and this prescription was added. Make sure you understand how and when to take each.   Used for:  Acute systolic congestive heart failure (H)        Dose:  40 mg   Take 2 tablets (40 mg) by mouth every morning   Quantity:  30 tablet   Refills:  3       * torsemide 20 MG tablet   Commonly known as:  DEMADEX   This may have changed:  You were already taking a medication with the same name, and this prescription was added. Make sure you understand how and when to take each.   Used for:  Acute systolic congestive heart failure (H)        Dose:  40 mg   Take 2 tablets (40 mg) by mouth every morning   Quantity:  90 tablet   Refills:  3       * torsemide 20 MG tablet   Commonly known as:  DEMADEX   This may have changed:  You were already taking a medication with the same name, and this prescription was added. Make sure you understand how and when to take each.   Used for:  Acute systolic congestive heart failure (H)        Dose:  20 mg   Take 1 tablet (20 mg) by mouth every evening   Quantity:  90 tablet   Refills:  3       * Notice:  This list has 4 medication(s) that are the  same as other medications prescribed for you. Read the directions carefully, and ask your doctor or other care provider to review them with you.      CONTINUE these medicines which have NOT CHANGED        Dose / Directions    aspirin 81 MG EC tablet   Used for:  Acute systolic congestive heart failure (H)        Dose:  81 mg   Take 1 tablet (81 mg) by mouth daily   Quantity:  30 tablet   Refills:  3       atorvastatin 10 MG tablet   Commonly known as:  LIPITOR   Used for:  Acute systolic congestive heart failure (H)        TAKE 1 TABLET BY MOUTH EVERY DAY   Quantity:  90 tablet   Refills:  3       digoxin 250 MCG tablet   Commonly known as:  LANOXIN   Used for:  Acute on chronic systolic heart failure (H)        TAKE 1 TABLET (250 MCG) BY MOUTH DAILY   Quantity:  90 tablet   Refills:  3       metFORMIN 500 MG tablet   Commonly known as:  GLUCOPHAGE   Used for:  Chronic systolic heart failure (H), Acute systolic congestive heart failure (H)        Dose:  500 mg   Take 1 tablet (500 mg) by mouth 2 times daily (with meals)   Refills:  0       metoprolol succinate 25 MG 24 hr tablet   Commonly known as:  TOPROL-XL   Used for:  Acute on chronic systolic heart failure (H)        Dose:  25 mg   Take 1 tablet (25 mg) by mouth daily   Quantity:  90 tablet   Refills:  3       sacubitril-valsartan 24-26 MG per tablet   Commonly known as:  ENTRESTO        Dose:  1 tablet   Take 1 tablet by mouth 2 times daily   Refills:  0       sildenafil 50 MG tablet   Commonly known as:  VIAGRA   Used for:  Drug-induced impotence        Dose:  50 mg   Take 1 tablet (50 mg) by mouth daily as needed   Quantity:  20 tablet   Refills:  1       traZODone 100 MG tablet   Commonly known as:  DESYREL   Used for:  Insomnia, unspecified type        Dose:  100 mg   Take 1 tablet (100 mg) by mouth nightly as needed for sleep   Quantity:  90 tablet   Refills:  1            Where to get your medicines      These medications were sent to [facility]/pharmacy #[id number]  - [address] [address]     Phone:  [phone]     potassium chloride SA 20 MEQ CR tablet    torsemide 20 MG tablet    torsemide 20 MG tablet    torsemide 20 MG tablet    torsemide 20 MG tablet                Protect others around you: Learn how to safely use, store and throw away your medicines at www.disposemymeds.org.             Medication List: This is a list of all your medications and when to take them. Check marks below indicate your daily home schedule. Keep this list as a reference.      Medications           Morning Afternoon Evening Bedtime As Needed    aspirin 81 MG EC tablet   Take 1 tablet (81 mg) by mouth daily   Last time this was given:  81 mg on 6/15/2018  9:34 AM                                atorvastatin 10 MG tablet   Commonly known as:  LIPITOR   TAKE 1 TABLET BY MOUTH EVERY DAY   Last time this was given:  10 mg on 6/14/2018  7:35 PM                                digoxin 250 MCG tablet   Commonly known as:  LANOXIN   TAKE 1 TABLET (250 MCG) BY MOUTH DAILY   Last time this was given:  250 mcg on 6/15/2018  9:35 AM                                metFORMIN 500 MG tablet   Commonly known as:  GLUCOPHAGE   Take 1 tablet (500 mg) by mouth 2 times daily (with meals)                                metoprolol succinate 25 MG 24 hr tablet   Commonly known as:  TOPROL-XL   Take 1 tablet (25 mg) by mouth daily                                potassium chloride SA 20 MEQ CR tablet   Commonly known as:  K-DUR/KLOR-CON M   Take 1 tablet (20 mEq) by mouth daily   Last time this was given:  20 mEq on 6/15/2018  9:49 AM                                sacubitril-valsartan 24-26 MG per tablet   Commonly known as:  ENTRESTO   Take 1 tablet by mouth 2 times daily   Last time this was given:  1 tablet on 6/15/2018  9:34 AM                                sildenafil 50 MG tablet   Commonly known as:  VIAGRA   Take 1 tablet (50 mg) by mouth daily  as needed                                * torsemide 20 MG tablet   Commonly known as:  DEMADEX   Take 1 tablet (20 mg) by mouth every evening   Last time this was given:  40 mg on 6/15/2018  9:33 AM                                * torsemide 20 MG tablet   Commonly known as:  DEMADEX   Take 2 tablets (40 mg) by mouth every morning   Last time this was given:  40 mg on 6/15/2018  9:33 AM                                * torsemide 20 MG tablet   Commonly known as:  DEMADEX   Take 2 tablets (40 mg) by mouth every morning   Last time this was given:  40 mg on 6/15/2018  9:33 AM                                * torsemide 20 MG tablet   Commonly known as:  DEMADEX   Take 1 tablet (20 mg) by mouth every evening   Last time this was given:  40 mg on 6/15/2018  9:33 AM                                traZODone 100 MG tablet   Commonly known as:  DESYREL   Take 1 tablet (100 mg) by mouth nightly as needed for sleep   Last time this was given:  100 mg on 6/14/2018 11:55 PM                                * Notice:  This list has 4 medication(s) that are the same as other medications prescribed for you. Read the directions carefully, and ask your doctor or other care provider to review them with you.

## 2018-06-11 NOTE — IP AVS SNAPSHOT
[address] [address]    Phone:  [phone]                                       After Visit Summary   6/11/2018    Danielito Chu    MRN: [id number]           After Visit Summary Signature Page     I have received my discharge instructions, and my questions have been answered. I have discussed any challenges I see with this plan with the nurse or doctor.    ..........................................................................................................................................  Patient/Patient Representative Signature      ..........................................................................................................................................  Patient Representative Print Name and Relationship to Patient    ..................................................               ................................................  Date                                            Time    ..........................................................................................................................................  Reviewed by Signature/Title    ...................................................              ..............................................  Date                                                            Time

## 2018-06-11 NOTE — H&P
"         Cardiology History and Physical  Danielito Chu MRN: [id number]  Age: 63 year old, : 1955  Primary care provider: Bashir Hines           Chief Complaint:     S/p RHC          History of Present Illness:     History is obtained from the patient     Danielito Chu is a 63 year old man with a PMHx of HFrEF (EF 30% ) 2/2 to NICM s/p ICD 17, mild to moderate TR, DMII, HTN and SHIRLEY who presents with recent hx of progressive fatigue and RHC  showing elevated right and left sided filling pressures and decreased cardiac output.    Pt notes increasing fatigue over past several months since . Stopped  job (no physical labor) in  due to ongoing fatigue and BAJWA. Notes he gets SOB currently with minimal activity and has been spending most of his time in his house. Denies any chest pain or palpitations. Notes stable orthopnea (3 pillows). Notes LE swelling and abdominal swelling over past week. Weight is up over past week, 210 is good weight and most recent weight is 218. Notes he misses diuretic dosing \"15% of the time\" and per sister eats [employer] foods. Additionally notes mood has been down with no SI or HI.     Review of Systems:   A comprehensive review of systems is negative except that which is listed in the HPI.           Past Medical History:     Past Medical History:   Diagnosis Date     Acute systolic congestive heart failure (H) 2017     Diabetes (H)      HTN (hypertension)      Hyperlipidemia      Mitral regurgitation      Nocturnal oxygen desaturation 3/29/2018     Nonischemic cardiomyopathy (H) 2017     SHIRLEY (obstructive sleep apnea)      Pacemaker 2017    ICD 17              Past Surgical History:      History reviewed. No pertinent surgical history.           Social History:     Social History   Substance Use Topics     Smoking status: Former Smoker     Smokeless tobacco: Never Used      Comment: quit 3/2017     Alcohol use " Not on file      Comment: social              Family History:     Family History   Problem Relation Age of Onset     Heart Failure Father       at age 86     Heart Failure Paternal Uncle       at 66      Myocardial Infarction Paternal Uncle       at age 62     Coronary Artery Disease Mother       during CABG at age 41     Family history reviewed and updated in EPIC          Allergies:     No Known Allergies           Medications:     Current Facility-Administered Medications   Medication     Continuing ACE inhibitor/ARB/ARNI from home medication list OR ACE inhibitor/ARB/ARNI order already placed during this visit     Continuing beta blocker from home medication list OR beta blocker order already placed during this visit     HOLD nitroGLYcerin IF                Physical Exam:     B/P: 97/67, T: 97.5, P: 87, R: 24    Wt Readings from Last 4 Encounters:   18 97.5 kg (215 lb)   18 98.7 kg (217 lb 8 oz)   18 96.8 kg (213 lb 4.8 oz)   18 99.9 kg (220 lb 4.8 oz)       No intake or output data in the 24 hours ending 18 1514    Gen: AA&Ox3, no acute distress  HEENT:AT/ NC, PERRL b/l, EOM grossly intact, mucous membranes pink, moist without plaque or exudate, JVP below jaw.   BACK: no CVA tenderness, no midline bony tenderness  PULM/THORAX: Clear to auscultation bilaterally, no rales/rhonchi/wheezes  CV:RRR, S1 and S2 appreciated, no extra heart sounds, murmurs or rub auscultated. No JVD  ABD: obese, soft, nontender, nondistended. Normoactive bowel sounds x 4, no HSM appreciated.  EXT: 1+ pitting edema to shins b/l, no clubbing or cyanosis. No asymmetrical edema or tenderness to palpation in calves bilaterally.  NEURO: No focal deficits.             Data:     Labs Reviewed on Admission  Pertinent for:  No results found for: TROPI, TROPONIN, TROPR, TROPN        Most Recent Imaging:     Echo :   Interpretation Summary  The LVEF is visually estimated in the 30 % range  (calculated, 28 %.) Severe  diffuse hypokinesis with inferior wall akinesis is present.  Mild to moderate right ventricular dilation is present. Global right  ventricular function is normal.  Mild aortic insufficiency is present.  Pulmonary artery systolic pressure is normal.  Dilation of the inferior vena cava is present with normal respiratory  variation in diameter. Estimated mean right atrial pressure is 8 mmHg.  No pericardial effusion is present.  Previous study not available for comparison.    RHC 6/11:  Results (pressures in mmHg)  RA: 26/32/20  RV 45/20  PA 45/25/32;  PA Sat 48.4%  PCWP 30/37/25;  PCW Sat 96.8%  Timmy CO/CI 2.8/1.3 L/min; TD CO 3.0/1.4 L/min  PVR 2.5 bonilla; TPR 11.4 bonilla  Hb 14.5 g/dL  NIBP 108/80/90  SVR 1860 dynes*sec/cm^5         Assessment and Plan:     Danielito Chu is a 63 year old man with a PMHx of HFrEF (EF 30% June '17) 2/2 to NICM s/p ICD 4/7/17, mild to moderate TR, DMII, HTN and SHIRLEY who presents with recent hx of progressive fatigue s/p outpt RHC c/w elevated filling pressures and reduced cardiac outpt c/w with CHF exacerbation.     # Acute on Chronic HFrEF (last EF 15%) 2/2 NICM s/p ICD  Stage D, NYHA Class III  NICM of unclear etiology, possibly viral in origin. RHC 6/11: CI 1.3 CO 2.8 RA 26/32/20, PA 45/25/32, PCWP 30/37/25, SVR 1860, prompting admission for decompensated HF. Decompensated state is in the setting of medication non-compliance and dietary indiscretion.  Admitted for volume status optimization.   -bumex gtt at 1.0 mg/hr with bolus, goal ~2L net negative (On Torsemide 20 mg po daily at home)    -Afterload reduce: Hydralazine 10 mg TID and isordil 10 mg TID   -ACEi/ARB: Entresto 49/51 mg po BID    -BB: hold given decreased CO/CI (on Toprol XL 25 mg po daily)    -Aldosterone antagonist: Aldactone discontinued in setting of hyperkalemia   -SCD prophylaxis ICD  -strict I/O's, daily weights and Na/fluid restricted diet   -repeat TTE once closer to euvolemic     #  Depressed mood  In the setting of progressive HFrEF, no SI or HI.  -offered medication mgmt vs therapy/psychiatry or health psychology consult and pt declined     ===Chronic Medical Problems===    # Sleep Apnea   - CPAP    # HTN  -hydralazine and isordil as above   -holding entresto as above     # HLD  -continue PTA statin     # DMII  -hold PTA metformin   -monitor BG's, start low dose SSI if elevated     # CAD ppx  -continue PTA statin     FEN: Cardiac diet   PPX: Heparin subq    Code Status: FULL CODE     Patient discussed with staff attending, Dr. Watkins.  Please feel free to page with questions.    Alan Chung MD  PGY-2 Internal Medicine  Cardiology Service  Pager: [phone]

## 2018-06-11 NOTE — DISCHARGE INSTRUCTIONS
[facility]                        Interventional Cardiology  Discharge Instructions   Post Right Heart Cath      AFTER YOU GO HOME:    DO drink plenty of fluids    DO resume your regular diet and medications unless otherwise instructed by your Primary Physician    Do Not scrub the procedure site vigorously    No lotion or powder to the puncture site for 3 days    CALL YOUR PRIMARY PHYSICIAN IF: You may resume all normal activity.  Monitor neck site for bleeding, swelling, or voice changes. If you notice bleeding or swelling immediately apply pressure to the site and call number below to speak with Cardiology Fellow.  If you experience any changes in your breathing you should call your doctor immediately or come to the closest Emergency Department.  Do not drive yourself.    ADDITIONAL INSTRUCTIONS: Medications: You are to resume all home medications including anticoagulation therapy unless otherwise advised by your primary cardiologist or nurse coordinator.    Follow Up: Per your primary cardiology team    If you have any questions or concerns regarding your procedure site please call [phone] at anytime and ask for Cardiology Fellow on call.  They are available 24 hours a day.  You may also contact the Cardiology Clinic after hours number at [phone].                                                       Telephone Numbers [phone] Monday-Friday 8:00 am to 4:30 pm    [phone] After 4:30 pm Monday-Friday, Weekends & Holidays  Ask for Interventional Cardiologist on call. Someone is on call 24 hours/day   [facility] toll free number [phone] Monday-Friday 8:00 am to 4:30 pm   [facility] Emergency Dept [phone]

## 2018-06-12 ENCOUNTER — APPOINTMENT (OUTPATIENT)
Dept: OCCUPATIONAL THERAPY | Facility: CLINIC | Age: 63
DRG: 287 | End: 2018-06-12
Attending: INTERNAL MEDICINE
Payer: COMMERCIAL

## 2018-06-12 ENCOUNTER — APPOINTMENT (OUTPATIENT)
Dept: GENERAL RADIOLOGY | Facility: CLINIC | Age: 63
DRG: 287 | End: 2018-06-12
Attending: INTERNAL MEDICINE
Payer: COMMERCIAL

## 2018-06-12 LAB
ANION GAP SERPL CALCULATED.3IONS-SCNC: 10 MMOL/L (ref 3–14)
ANION GAP SERPL CALCULATED.3IONS-SCNC: 8 MMOL/L (ref 3–14)
BILIRUB DIRECT SERPL-MCNC: 1.8 MG/DL (ref 0–0.2)
BILIRUB SERPL-MCNC: 3.2 MG/DL (ref 0.2–1.3)
BUN SERPL-MCNC: 20 MG/DL (ref 7–30)
BUN SERPL-MCNC: 21 MG/DL (ref 7–30)
CALCIUM SERPL-MCNC: 8.9 MG/DL (ref 8.5–10.1)
CALCIUM SERPL-MCNC: 8.9 MG/DL (ref 8.5–10.1)
CHLORIDE SERPL-SCNC: 99 MMOL/L (ref 94–109)
CHLORIDE SERPL-SCNC: 99 MMOL/L (ref 94–109)
CO2 SERPL-SCNC: 27 MMOL/L (ref 20–32)
CO2 SERPL-SCNC: 30 MMOL/L (ref 20–32)
CREAT SERPL-MCNC: 1.03 MG/DL (ref 0.66–1.25)
CREAT SERPL-MCNC: 1.04 MG/DL (ref 0.66–1.25)
ERYTHROCYTE [DISTWIDTH] IN BLOOD BY AUTOMATED COUNT: 18.8 % (ref 10–15)
GFR SERPL CREATININE-BSD FRML MDRD: 72 ML/MIN/1.7M2
GFR SERPL CREATININE-BSD FRML MDRD: 73 ML/MIN/1.7M2
GLUCOSE SERPL-MCNC: 129 MG/DL (ref 70–99)
GLUCOSE SERPL-MCNC: 174 MG/DL (ref 70–99)
HCT VFR BLD AUTO: 46.8 % (ref 40–53)
HGB BLD-MCNC: 14.9 G/DL (ref 13.3–17.7)
INR PPP: 1.58 (ref 0.86–1.14)
INTERPRETATION ECG - MUSE: NORMAL
MAGNESIUM SERPL-MCNC: 2.3 MG/DL (ref 1.6–2.3)
MAGNESIUM SERPL-MCNC: 2.4 MG/DL (ref 1.6–2.3)
MCH RBC QN AUTO: 29.8 PG (ref 26.5–33)
MCHC RBC AUTO-ENTMCNC: 31.8 G/DL (ref 31.5–36.5)
MCV RBC AUTO: 94 FL (ref 78–100)
PLATELET # BLD AUTO: 183 10E9/L (ref 150–450)
POTASSIUM SERPL-SCNC: 3.6 MMOL/L (ref 3.4–5.3)
POTASSIUM SERPL-SCNC: 3.7 MMOL/L (ref 3.4–5.3)
POTASSIUM SERPL-SCNC: 3.7 MMOL/L (ref 3.4–5.3)
RBC # BLD AUTO: 5 10E12/L (ref 4.4–5.9)
SODIUM SERPL-SCNC: 136 MMOL/L (ref 133–144)
SODIUM SERPL-SCNC: 136 MMOL/L (ref 133–144)
WBC # BLD AUTO: 7.3 10E9/L (ref 4–11)

## 2018-06-12 PROCEDURE — 25000132 ZZH RX MED GY IP 250 OP 250 PS 637: Performed by: INTERNAL MEDICINE

## 2018-06-12 PROCEDURE — 97165 OT EVAL LOW COMPLEX 30 MIN: CPT | Mod: GO | Performed by: OCCUPATIONAL THERAPIST

## 2018-06-12 PROCEDURE — 97110 THERAPEUTIC EXERCISES: CPT | Mod: GO | Performed by: OCCUPATIONAL THERAPIST

## 2018-06-12 PROCEDURE — 80048 BASIC METABOLIC PNL TOTAL CA: CPT | Performed by: INTERNAL MEDICINE

## 2018-06-12 PROCEDURE — 71045 X-RAY EXAM CHEST 1 VIEW: CPT

## 2018-06-12 PROCEDURE — 84132 ASSAY OF SERUM POTASSIUM: CPT | Performed by: INTERNAL MEDICINE

## 2018-06-12 PROCEDURE — 85610 PROTHROMBIN TIME: CPT | Performed by: INTERNAL MEDICINE

## 2018-06-12 PROCEDURE — 36415 COLL VENOUS BLD VENIPUNCTURE: CPT | Performed by: INTERNAL MEDICINE

## 2018-06-12 PROCEDURE — 40000133 ZZH STATISTIC OT WARD VISIT: Performed by: OCCUPATIONAL THERAPIST

## 2018-06-12 PROCEDURE — 25000128 H RX IP 250 OP 636: Performed by: INTERNAL MEDICINE

## 2018-06-12 PROCEDURE — 85027 COMPLETE CBC AUTOMATED: CPT | Performed by: INTERNAL MEDICINE

## 2018-06-12 PROCEDURE — 21400006 ZZH R&B CCU INTERMEDIATE UMMC

## 2018-06-12 PROCEDURE — 82247 BILIRUBIN TOTAL: CPT | Performed by: INTERNAL MEDICINE

## 2018-06-12 PROCEDURE — 25000132 ZZH RX MED GY IP 250 OP 250 PS 637

## 2018-06-12 PROCEDURE — 97535 SELF CARE MNGMENT TRAINING: CPT | Mod: GO | Performed by: OCCUPATIONAL THERAPIST

## 2018-06-12 PROCEDURE — 83735 ASSAY OF MAGNESIUM: CPT | Performed by: INTERNAL MEDICINE

## 2018-06-12 PROCEDURE — 82248 BILIRUBIN DIRECT: CPT | Performed by: INTERNAL MEDICINE

## 2018-06-12 PROCEDURE — 99233 SBSQ HOSP IP/OBS HIGH 50: CPT | Mod: 25 | Performed by: INTERNAL MEDICINE

## 2018-06-12 RX ORDER — POTASSIUM CL/LIDO/0.9 % NACL 10MEQ/0.1L
10 INTRAVENOUS SOLUTION, PIGGYBACK (ML) INTRAVENOUS
Status: DISCONTINUED | OUTPATIENT
Start: 2018-06-12 | End: 2018-06-12 | Stop reason: RX

## 2018-06-12 RX ORDER — POTASSIUM CHLORIDE 14.9 MG/ML
20 INJECTION INTRAVENOUS
Status: DISCONTINUED | OUTPATIENT
Start: 2018-06-12 | End: 2018-06-15

## 2018-06-12 RX ORDER — POTASSIUM CHLORIDE 7.45 MG/ML
10 INJECTION INTRAVENOUS
Status: DISCONTINUED | OUTPATIENT
Start: 2018-06-12 | End: 2018-06-15

## 2018-06-12 RX ORDER — MAGNESIUM SULFATE HEPTAHYDRATE 40 MG/ML
2 INJECTION, SOLUTION INTRAVENOUS DAILY PRN
Status: DISCONTINUED | OUTPATIENT
Start: 2018-06-12 | End: 2018-06-15 | Stop reason: HOSPADM

## 2018-06-12 RX ORDER — POTASSIUM CHLORIDE 1.5 G/1.58G
20-40 POWDER, FOR SOLUTION ORAL
Status: DISCONTINUED | OUTPATIENT
Start: 2018-06-12 | End: 2018-06-15

## 2018-06-12 RX ORDER — POTASSIUM CHLORIDE 750 MG/1
20-40 TABLET, EXTENDED RELEASE ORAL
Status: DISCONTINUED | OUTPATIENT
Start: 2018-06-12 | End: 2018-06-15

## 2018-06-12 RX ORDER — MAGNESIUM SULFATE HEPTAHYDRATE 40 MG/ML
4 INJECTION, SOLUTION INTRAVENOUS EVERY 4 HOURS PRN
Status: DISCONTINUED | OUTPATIENT
Start: 2018-06-12 | End: 2018-06-15 | Stop reason: HOSPADM

## 2018-06-12 RX ADMIN — HYDRALAZINE HYDROCHLORIDE 10 MG: 10 TABLET, FILM COATED ORAL at 20:52

## 2018-06-12 RX ADMIN — FUROSEMIDE 20 MG/HR: 10 INJECTION, SOLUTION INTRAVENOUS at 14:41

## 2018-06-12 RX ADMIN — ISOSORBIDE DINITRATE 10 MG: 10 TABLET ORAL at 08:48

## 2018-06-12 RX ADMIN — POTASSIUM CHLORIDE 20 MEQ: 750 TABLET, EXTENDED RELEASE ORAL at 16:26

## 2018-06-12 RX ADMIN — SACUBITRIL AND VALSARTAN 1 TABLET: 24; 26 TABLET, FILM COATED ORAL at 20:52

## 2018-06-12 RX ADMIN — HEPARIN SODIUM 5000 UNITS: 5000 INJECTION, SOLUTION INTRAVENOUS; SUBCUTANEOUS at 14:41

## 2018-06-12 RX ADMIN — ASPIRIN 81 MG: 81 TABLET, COATED ORAL at 08:42

## 2018-06-12 RX ADMIN — POTASSIUM CHLORIDE 20 MEQ: 750 TABLET, EXTENDED RELEASE ORAL at 23:31

## 2018-06-12 RX ADMIN — HEPARIN SODIUM 5000 UNITS: 5000 INJECTION, SOLUTION INTRAVENOUS; SUBCUTANEOUS at 06:05

## 2018-06-12 RX ADMIN — ISOSORBIDE DINITRATE 10 MG: 10 TABLET ORAL at 14:41

## 2018-06-12 RX ADMIN — HYDRALAZINE HYDROCHLORIDE 10 MG: 10 TABLET, FILM COATED ORAL at 08:48

## 2018-06-12 RX ADMIN — ISOSORBIDE DINITRATE 10 MG: 10 TABLET ORAL at 20:52

## 2018-06-12 RX ADMIN — ATORVASTATIN CALCIUM 10 MG: 10 TABLET, FILM COATED ORAL at 20:52

## 2018-06-12 RX ADMIN — FUROSEMIDE 20 MG/HR: 10 INJECTION, SOLUTION INTRAVENOUS at 23:54

## 2018-06-12 RX ADMIN — DIGOXIN 250 MCG: 125 TABLET ORAL at 08:42

## 2018-06-12 RX ADMIN — HYDRALAZINE HYDROCHLORIDE 10 MG: 10 TABLET, FILM COATED ORAL at 14:41

## 2018-06-12 RX ADMIN — HEPARIN SODIUM 5000 UNITS: 5000 INJECTION, SOLUTION INTRAVENOUS; SUBCUTANEOUS at 22:22

## 2018-06-12 RX ADMIN — SACUBITRIL AND VALSARTAN 1 TABLET: 24; 26 TABLET, FILM COATED ORAL at 08:43

## 2018-06-12 RX ADMIN — POTASSIUM CHLORIDE 20 MEQ: 750 TABLET, EXTENDED RELEASE ORAL at 08:53

## 2018-06-12 RX ADMIN — FUROSEMIDE 20 MG/HR: 10 INJECTION, SOLUTION INTRAVENOUS at 19:14

## 2018-06-12 ASSESSMENT — ACTIVITIES OF DAILY LIVING (ADL): PREVIOUS_RESPONSIBILITIES: MEAL PREP;HOUSEKEEPING;LAUNDRY;SHOPPING;YARDWORK;MEDICATION MANAGEMENT;FINANCES;DRIVING

## 2018-06-12 ASSESSMENT — PAIN DESCRIPTION - DESCRIPTORS: DESCRIPTORS: SORE

## 2018-06-12 NOTE — PLAN OF CARE
Admission    Diagnosis: heart failure exacerbation   Admitted from: cath lab  Via: litter  Accompanied by: sister nephew  Belongings: Placed in closet; valuables sent home with family, declined sending any items to security.  Admission Profile: Complete  Teaching: orientation to unit, call don't fall, use of console, meal times, visiting hours, when to call for the RN (angina/sob/dizzyness, etc.), and enforced importance of safety   Access: PIV ordered  Telemetry: Placed on patient  Height/Weight: Complete

## 2018-06-12 NOTE — PLAN OF CARE
Problem: Cardiac: Heart Failure (Adult)  Goal: Signs and Symptoms of Listed Potential Problems Will be Absent, Minimized or Managed (Cardiac: Heart Failure)  Signs and symptoms of listed potential problems will be absent, minimized or managed by discharge/transition of care (reference Cardiac: Heart Failure (Adult) CPG).   D/pt reportedly admitted several hours ago, no report from admitting RN, did get report from circulating RN. meds and drips were ordered 2 hours ago, and when I came on, he does not have PIV or pump ordered. I mentioned that admission was not done, yet and SOEAS circulating RN did admission questions.   P/await PIV placement and pump to give the Bumex bolus and start the drip.   D/He says he did not take any of his am meds, so I will give these also.  I/I was told vascular had been notified. But they have not come yet. So I talked to them and re-requested consult as he still has no PIV.  I/vascular got PIV in, and meds were given and drip was started  D/He refused Hydralazine and Isordil tonight-talked to on call MD about this so team can talk to him about this in am.  P/monitor for changes  D/(report taken by another and pressures were high right heart cath, CM, HTN DM.)

## 2018-06-12 NOTE — PROGRESS NOTES
Cardiology Progress Note  Danielito Chu MRN: [id number]  Age: 63 year old, : 1955  Date: 2018              Subjective     ROMULO overnight. Did not sleep well however this is baseline. Notes he feels more mentally clear and less fatigued. Working with OT. No chest pain or SOB. No f/c. Tolerating PO intake. Discussed hydralyzine and isordil for afterload reduction and pt agrees to take.           Objective     B/P: 101/68, T: 97.5, P: 87, R: 18    Intake/Output Summary (Last 24 hours) at 18 0738  Last data filed at 18 0717   Gross per 24 hour   Intake           133.74 ml   Output             3345 ml   Net         -3211.26 ml     Vitals:    18 1019 18 1705 18 0600   Weight: 97.5 kg (215 lb) 99.1 kg (218 lb 8 oz) 97.2 kg (214 lb 3.2 oz)         Gen: AA&Ox3, no acute distress  HEENT: JVP  Mid neck.   BACK: no CVA tenderness, no midline bony tenderness  PULM/THORAX: Clear to auscultation bilaterally, no rales/rhonchi/wheezes  CV:RRR, S1 and S2 appreciated, no extra heart sounds, murmurs or rub auscultated. No JVD  ABD: obese, soft, nontender, nondistended. Normoactive bowel sounds x 4, no HSM appreciated.  EXT: 1+ pitting edema to shins b/l, no clubbing or cyanosis. No asymmetrical edema or tenderness to palpation in calves bilaterally.  NEURO: No focal deficits.             Data:     LABS reviewed and pertinent for:       Lab Results   Component Value Date     2018    Lab Results   Component Value Date    CHLORIDE 99 2018    Lab Results   Component Value Date    BUN 22 2018      Lab Results   Component Value Date    POTASSIUM 3.9 2018    Lab Results   Component Value Date    CO2 30 2018    Lab Results   Component Value Date    CR 1.13 2018        Lab Results   Component Value Date    NTBNPI 3382 (H) 2018    NTBNP 1671 (H) 2018     Lab Results   Component Value Date    WBC 7.3 2018     HGB 14.9 06/12/2018    HCT 46.8 06/12/2018    MCV 94 06/12/2018     06/12/2018     Lab Results   Component Value Date     (H) 06/11/2018               Medications     Current Facility-Administered Medications   Medication     aspirin EC tablet 81 mg     atorvastatin (LIPITOR) tablet 10 mg     bumetanide (BUMEX) 0.25 mg/mL infusion     Continuing ACE inhibitor/ARB/ARNI from home medication list OR ACE inhibitor/ARB/ARNI order already placed during this visit     Continuing beta blocker from home medication list OR beta blocker order already placed during this visit     digoxin (LANOXIN) tablet 250 mcg     heparin sodium PF injection 5,000 Units     HOLD nitroGLYcerin IF     hydrALAZINE (APRESOLINE) tablet 10 mg     isosorbide dinitrate (ISORDIL) tablet 10 mg     lidocaine (LMX4) kit     lidocaine 1 % 1 mL     sacubitril-valsartan (ENTRESTO) 24-26 MG per tablet 1 tablet     sodium chloride (PF) 0.9% PF flush 3 mL     sodium chloride (PF) 0.9% PF flush 3 mL     traZODone (DESYREL) tablet 100 mg           Assessment and Plan:     Danielito Chu is a 63 year old man with a PMHx of HFrEF (EF 30% June '17) 2/2 to NICM s/p ICD 4/7/17, mild to moderate TR, DMII, HTN and SHIRLEY who presents with recent hx of progressive fatigue s/p outpt RHC c/w elevated filling pressures and reduced cardiac outpt c/w with CHF exacerbation.      Plan Today:  -change bumex to lasix gtt (bumex shortage per pharmacy)  -f/u afternoon BMP and I/O's, consider backing off diuresis in PM given robust UOP     # Acute on Chronic HFrEF (last EF 15%) 2/2 NICM s/p ICD  Stage D, NYHA Class III  NICM of unclear etiology, possibly viral in origin. RHC 6/11: CI 1.3 CO 2.8 RA 26/32/20, PA 45/25/32, PCWP 30/37/25, SVR 1860, prompting admission for decompensated HF. Decompensated state is in the setting of medication non-compliance and dietary indiscretion.  Admitted for volume status optimization.   -lasix gtt at 20 mg/hr with bolus (On  Torsemide 20 mg po daily at home)    -Afterload reduce: Hydralazine 10 mg TID and isordil 10 mg TID   -ACEi/ARB: Entresto 49/51 mg po BID, could uptitrate if pt tolerates     -BB: hold given decreased CO/CI (on Toprol XL 25 mg po daily)    -Aldosterone antagonist: Aldactone discontinued in setting of hyperkalemia   -SCD prophylaxis ICD  -strict I/O's, daily weights and Na/fluid restricted diet   -repeat TTE once closer to euvolemic      # Depressed mood  In the setting of progressive HFrEF, no SI or HI.  -offered medication mgmt vs therapy/psychiatry or health psychology consult and pt declined   -mood improved HD#2 with HF mgmt     ===Chronic Medical Problems===     # Sleep Apnea   - CPAP     # HTN  -hydralazine and isordil as above   -holding entresto as above      # HLD  -continue PTA statin      # DMII (Hgb A1C 6.8 6/11/18)  -hold PTA metformin   -monitor BG's, start low dose SSI if elevated      # CAD ppx  -continue PTA statin      FEN: Cardiac diet   PPX: Heparin subq     Code Status: FULL CODE      Patient discussed with staff attending, Dr. Watkins.  Please feel free to page with questions.     Alan Chung MD  PGY-2 Internal Medicine  Cardiology Service  Pager: [phone]

## 2018-06-12 NOTE — PLAN OF CARE
Problem: Cardiac: Heart Failure (Adult)  Goal: Signs and Symptoms of Listed Potential Problems Will be Absent, Minimized or Managed (Cardiac: Heart Failure)  Signs and symptoms of listed potential problems will be absent, minimized or managed by discharge/transition of care (reference Cardiac: Heart Failure (Adult) CPG).   Outcome: Improving  D/continues on Bumex drip and is diuresing. Says no reason to take sleeping pill as he will be up all night.  P/monitor output, renal function, lytes and changes in CHF symptoms.   D/He says that his abdominal fluid is not as bad as it has been in past. Before abdomen was so full of fluid that it was firm to the touch. Today, it has some fluid he says, but feel soft to touch.  A/diuresing  I/CHF folder given and CHF teaching checklist posted on the greaseboard.  0400 up and walking in the dixon, says bed gives him a backache and so he wants to do couple rounds on the 6th floor  D/says he voids several times during the hour since drip was started  A/Bumex is working.   0500 up walking the halls, says he feels better even without much sleep  --sleep--  D/brief naps only as voids several times an hour in between walking the halls

## 2018-06-12 NOTE — PROGRESS NOTES
06/12/18 0800   Quick Adds   Type of Visit Initial Occupational Therapy Evaluation   Living Environment   Lives With alone   Living Arrangements house   Home Accessibility bed and bath on same level;tub/shower is not walk in;stairs to enter home;stairs within home   Number of Stairs to Enter Home 2   Number of Stairs Within Home 24  (2 flights)   Stair Railings at Home inside, present on left side   Transportation Available car   Living Environment Comment Pt lives alone in a home. Bedroom and bathroom are on the main level. Pt has an upstairs and downstairs, however does not use other levels.    Self-Care   Dominant Hand right   Usual Activity Tolerance good   Current Activity Tolerance moderate   Regular Exercise no   Activity/Exercise/Self-Care Comment Pt is independent with all ADLs/IADls and functional mobility.    Functional Level Prior   Ambulation 0-->independent   Transferring 0-->independent   Toileting 0-->independent   Bathing 0-->independent   Dressing 0-->independent   Eating 0-->independent   Communication 0-->understands/communicates without difficulty   Cognition 0 - no cognition issues reported   Fall history within last six months no   Which of the above functional risks had a recent onset or change? ambulation       Present no   Language english   General Information   Onset of Illness/Injury or Date of Surgery - Date 06/11/18   Referring Physician Alex Chung MD   Patient/Family Goals Statement To return home   Additional Occupational Profile Info/Pertinent History of Current Problem Danielito Chu is a 63 year old man with a PMHx of HFrEF (EF 30% June '17) 2/2 to NICM s/p ICD 4/7/17, mild to moderate TR, DMII, HTN and SHIRLEY who presents with recent hx of progressive fatigue and RHC 6/11 showing elevated right and left sided filling pressures and decreased cardiac output.   Precautions/Limitations no known precautions/limitations   Heart Disease Risk Factors  Overweight;Lack of physical activity   General Observations Pt is pleasant and agreeable to therapy.   General Info Comments Activity: Up ad moshe   Cognitive Status Examination   Orientation orientation to person, place and time   Level of Consciousness alert   Able to Follow Commands WNL/WFL   Personal Safety (Cognitive) WNL/WFL   Memory intact   Attention No deficits were identified   Organization/Problem Solving No deficits were identified   Executive Function No deficits were identified   Cognitive Comment Pt is alert, oriented, and appropriate in conversation   Visual Perception   Visual Perception Comments Pt denies acute visual changes   Sensory Examination   Sensory Comments Pt has some tingling in Bilateral feet   Pain Assessment   Patient Currently in Pain No   Integumentary/Edema   Integumentary/Edema Comments Pt has edema in bilateral feet and abdomen   Range of Motion (ROM)   ROM Comment Bilateral UE WNL, Bilaterl LE WNL   Strength   Strength Comments Bilateral UE grossly 5/5, bilateral LE grossly 5/5   Hand Strength   Hand Strength Comments Bilateral hand strength WNL   Mobility   Bed Mobility Bed mobility skill: Sit to supine;Bed mobility skill: Supine to sit   Bed Mobility Skill: Sit to Supine   Level of [place]: Sit/Supine stand-by assist   Physical Assist/Nonphysical Assist: Sit/Supine supervision   Bed Mobility Skill: Supine to Sit   Level of [place]: Supine/Sit stand-by assist   Physical Assist/Nonphysical Assist: Supine/Sit supervision   Transfer Skill: Sit to Stand   Level of [place]: Sit/Stand stand-by assist   Physical Assist/Nonphysical Assist: Sit/Stand supervision   Transfer Skill: Sit to Stand full weight-bearing   Lower Body Dressing   Level of [place]: Dress Lower Body stand-by assist   Physical Assist/Nonphysical Assist: Dress Lower Body supervision   Instrumental Activities of Daily Living (IADL)   Previous Responsibilities meal  "prep;housekeeping;laundry;shopping;yardwork;medication management;finances;driving   IADL Comments Pt is independent with all IADLs.   Activities of Daily Living Analysis   Impairments Contributing to Impaired Activities of Daily Living strength decreased   ADL Comments generalized deconditioning; edema, fatigue   General Therapy Interventions   Planned Therapy Interventions strengthening;progressive activity/exercise;risk factor education;home program guidelines   Clinical Impression   Criteria for Skilled Therapeutic Interventions Met yes, treatment indicated   OT Diagnosis Decreased activity tolerance   Influenced by the following impairments decreased strength, SOB, fatigue, deconditioning   Assessment of Occupational Performance 1-3 Performance Deficits   Identified Performance Deficits ambulation; home management   Clinical Decision Making (Complexity) Low complexity   Therapy Frequency other (see comments)  (4x per week)   Predicted Duration of Therapy Intervention (days/wks) 6/18/18   Anticipated Discharge Disposition Home with Outpatient Therapy   Risks and Benefits of Treatment have been explained. Yes   Patient, Family & other staff in agreement with plan of care Yes   [facility] TM \"6 Clicks\"   2016, Trustees of [facility], under license to [employer].  All rights reserved.   6 Clicks Short Forms Daily Activity Inpatient Short Form   [facility] AM-PAC  \"6 Clicks\" Daily Activity Inpatient Short Form   1. Putting on and taking off regular lower body clothing? 4 - None   2. Bathing (including washing, rinsing, drying)? 4 - None   3. Toileting, which includes using toilet, bedpan or urinal? 4 - None   4. Putting on and taking off regular upper body clothing? 4 - None   5. Taking care of personal grooming such as brushing teeth? 4 - None   6. Eating meals? 4 - None   Daily Activity Raw Score (Score out of 24.Lower scores equate to lower levels of function) 24   Total Evaluation " Time   Total Evaluation Time (Minutes) 5

## 2018-06-12 NOTE — PLAN OF CARE
Problem: Cardiac: Heart Failure (Adult)  Goal: Signs and Symptoms of Listed Potential Problems Will be Absent, Minimized or Managed (Cardiac: Heart Failure)  Signs and symptoms of listed potential problems will be absent, minimized or managed by discharge/transition of care (reference Cardiac: Heart Failure (Adult) CPG).   D/He continues on bumex drip and says that he did not sleep last night. Sister is here and says he was anxious last evening. He tells me that he does have some fluid on abdomen. But, in the past his abdomen has had so much fluid that abdomen feels firm to the touch. He showed me that it is large but soft. He was planning to take a sleeping pill, but he declines now as he says he will be up all night peeing so there is no point.   A/diuresing   P/monitor output, lytes, renal function per usual. Watch for changes in CHF symptoms also  I/given CHF folder  ---rhythm  Sinus with PVC's and 12 beat run of Afib in middle of the night  P/await am lytes with diuresis

## 2018-06-12 NOTE — PROGRESS NOTES
Preliminary Device Interrogation Results.  Final physician signed paceart report to be scanned and attached.    Scheduled [employer] single chamber ICD remote transmission received and reviewed. Device transmission sent per MD orders. His presenting rhythm is an irregular ventricular rhythm ~90 bpm. 10 NSVT episodes recorded lasting up to 5 seconds (log book states 12-16 seconds). Normal device function. = 0%. Lead trends appear stable. Battery estimates 12 years to [place]. Pt notified of transmission results. Plan for pt to RTC in 2 months as scheduled.  Remote ICD transmission

## 2018-06-12 NOTE — PLAN OF CARE
Problem: Patient Care Overview  Goal: Plan of Care/Patient Progress Review  Outcome: Improving  D/A/I:  Patient A&O x4, denied palpitations, difficulty breathing, SOB, dizziness, and nausea.  Lung sounds clear to auscultation, faint heart murmur noted.  Had +1 edema in legs and feet.  Bumetanide gtt continued at 1 mg/hr (4 ml/hr); patient had significant urine output.  Reported generalized aches and pains, was transitioned to furosemide gtt at 20 mg/hr (20 ml/hr).  Continued to have significant urine output, see I&O flowsheet.  In sinus rhythm with frequent to trigeminal PVCs, HR 80s-90s, SBP 90s-100s, SaO2 97-99% on room air.  MD team notified of ectopy, electrolytes rechecked.  Potassium replaced per protocol.  Chest X-ray performed during shift, see Chart Review for results.  Ambulated in hallway independently with steady gait.  Sister visited during shift.    P:  Continue to monitor pain, VS, heart rhythm, fluid status, cardiac and respiratory status.  Notify care team of changes in patient condition or other concerns.  Continue diuresis and heart failure education.

## 2018-06-13 ENCOUNTER — APPOINTMENT (OUTPATIENT)
Dept: OCCUPATIONAL THERAPY | Facility: CLINIC | Age: 63
DRG: 287 | End: 2018-06-13
Attending: INTERNAL MEDICINE
Payer: COMMERCIAL

## 2018-06-13 LAB
ALBUMIN SERPL-MCNC: 3.7 G/DL (ref 3.4–5)
ALP SERPL-CCNC: 95 U/L (ref 40–150)
ALT SERPL W P-5'-P-CCNC: 16 U/L (ref 0–70)
ANION GAP SERPL CALCULATED.3IONS-SCNC: 11 MMOL/L (ref 3–14)
ANION GAP SERPL CALCULATED.3IONS-SCNC: 6 MMOL/L (ref 3–14)
AST SERPL W P-5'-P-CCNC: 25 U/L (ref 0–45)
BILIRUB DIRECT SERPL-MCNC: 1.8 MG/DL (ref 0–0.2)
BILIRUB SERPL-MCNC: 3 MG/DL (ref 0.2–1.3)
BUN SERPL-MCNC: 18 MG/DL (ref 7–30)
BUN SERPL-MCNC: 20 MG/DL (ref 7–30)
CALCIUM SERPL-MCNC: 9.3 MG/DL (ref 8.5–10.1)
CALCIUM SERPL-MCNC: 9.3 MG/DL (ref 8.5–10.1)
CHLORIDE SERPL-SCNC: 97 MMOL/L (ref 94–109)
CHLORIDE SERPL-SCNC: 98 MMOL/L (ref 94–109)
CO2 SERPL-SCNC: 27 MMOL/L (ref 20–32)
CO2 SERPL-SCNC: 33 MMOL/L (ref 20–32)
CREAT SERPL-MCNC: 1.09 MG/DL (ref 0.66–1.25)
CREAT SERPL-MCNC: 1.21 MG/DL (ref 0.66–1.25)
ERYTHROCYTE [DISTWIDTH] IN BLOOD BY AUTOMATED COUNT: 18.5 % (ref 10–15)
GFR SERPL CREATININE-BSD FRML MDRD: 61 ML/MIN/1.7M2
GFR SERPL CREATININE-BSD FRML MDRD: 69 ML/MIN/1.7M2
GLUCOSE SERPL-MCNC: 107 MG/DL (ref 70–99)
GLUCOSE SERPL-MCNC: 108 MG/DL (ref 70–99)
HCT VFR BLD AUTO: 48.2 % (ref 40–53)
HGB BLD-MCNC: 15.4 G/DL (ref 13.3–17.7)
INR PPP: 1.37 (ref 0.86–1.14)
MAGNESIUM SERPL-MCNC: 2.7 MG/DL (ref 1.6–2.3)
MCH RBC QN AUTO: 29.7 PG (ref 26.5–33)
MCHC RBC AUTO-ENTMCNC: 32 G/DL (ref 31.5–36.5)
MCV RBC AUTO: 93 FL (ref 78–100)
PLATELET # BLD AUTO: 204 10E9/L (ref 150–450)
POTASSIUM SERPL-SCNC: 3.9 MMOL/L (ref 3.4–5.3)
POTASSIUM SERPL-SCNC: 4.2 MMOL/L (ref 3.4–5.3)
PROT SERPL-MCNC: 8.1 G/DL (ref 6.8–8.8)
RBC # BLD AUTO: 5.19 10E12/L (ref 4.4–5.9)
SODIUM SERPL-SCNC: 136 MMOL/L (ref 133–144)
SODIUM SERPL-SCNC: 136 MMOL/L (ref 133–144)
WBC # BLD AUTO: 8.2 10E9/L (ref 4–11)

## 2018-06-13 PROCEDURE — 83735 ASSAY OF MAGNESIUM: CPT | Performed by: INTERNAL MEDICINE

## 2018-06-13 PROCEDURE — 25000132 ZZH RX MED GY IP 250 OP 250 PS 637

## 2018-06-13 PROCEDURE — 25000128 H RX IP 250 OP 636: Performed by: INTERNAL MEDICINE

## 2018-06-13 PROCEDURE — 40000133 ZZH STATISTIC OT WARD VISIT: Performed by: OCCUPATIONAL THERAPIST

## 2018-06-13 PROCEDURE — 21400006 ZZH R&B CCU INTERMEDIATE UMMC

## 2018-06-13 PROCEDURE — 25000132 ZZH RX MED GY IP 250 OP 250 PS 637: Performed by: STUDENT IN AN ORGANIZED HEALTH CARE EDUCATION/TRAINING PROGRAM

## 2018-06-13 PROCEDURE — 80076 HEPATIC FUNCTION PANEL: CPT | Performed by: INTERNAL MEDICINE

## 2018-06-13 PROCEDURE — 80048 BASIC METABOLIC PNL TOTAL CA: CPT | Performed by: INTERNAL MEDICINE

## 2018-06-13 PROCEDURE — 36415 COLL VENOUS BLD VENIPUNCTURE: CPT | Performed by: INTERNAL MEDICINE

## 2018-06-13 PROCEDURE — 40000275 ZZH STATISTIC RCP TIME EA 10 MIN

## 2018-06-13 PROCEDURE — 85027 COMPLETE CBC AUTOMATED: CPT | Performed by: INTERNAL MEDICINE

## 2018-06-13 PROCEDURE — 99232 SBSQ HOSP IP/OBS MODERATE 35: CPT | Mod: GC | Performed by: INTERNAL MEDICINE

## 2018-06-13 PROCEDURE — 85610 PROTHROMBIN TIME: CPT | Performed by: INTERNAL MEDICINE

## 2018-06-13 PROCEDURE — 25000132 ZZH RX MED GY IP 250 OP 250 PS 637: Performed by: INTERNAL MEDICINE

## 2018-06-13 PROCEDURE — 97110 THERAPEUTIC EXERCISES: CPT | Mod: GO | Performed by: OCCUPATIONAL THERAPIST

## 2018-06-13 RX ORDER — ALUMINA, MAGNESIA, AND SIMETHICONE 2400; 2400; 240 MG/30ML; MG/30ML; MG/30ML
30 SUSPENSION ORAL ONCE
Status: COMPLETED | OUTPATIENT
Start: 2018-06-13 | End: 2018-06-13

## 2018-06-13 RX ADMIN — ISOSORBIDE DINITRATE 10 MG: 10 TABLET ORAL at 15:36

## 2018-06-13 RX ADMIN — ISOSORBIDE DINITRATE 10 MG: 10 TABLET ORAL at 09:11

## 2018-06-13 RX ADMIN — POTASSIUM CHLORIDE 20 MEQ: 750 TABLET, EXTENDED RELEASE ORAL at 09:10

## 2018-06-13 RX ADMIN — HYDRALAZINE HYDROCHLORIDE 10 MG: 10 TABLET, FILM COATED ORAL at 09:09

## 2018-06-13 RX ADMIN — ATORVASTATIN CALCIUM 10 MG: 10 TABLET, FILM COATED ORAL at 19:20

## 2018-06-13 RX ADMIN — HEPARIN SODIUM 5000 UNITS: 5000 INJECTION, SOLUTION INTRAVENOUS; SUBCUTANEOUS at 21:11

## 2018-06-13 RX ADMIN — DIGOXIN 250 MCG: 125 TABLET ORAL at 09:11

## 2018-06-13 RX ADMIN — SACUBITRIL AND VALSARTAN 1 TABLET: 24; 26 TABLET, FILM COATED ORAL at 09:09

## 2018-06-13 RX ADMIN — HEPARIN SODIUM 5000 UNITS: 5000 INJECTION, SOLUTION INTRAVENOUS; SUBCUTANEOUS at 06:02

## 2018-06-13 RX ADMIN — HEPARIN SODIUM 5000 UNITS: 5000 INJECTION, SOLUTION INTRAVENOUS; SUBCUTANEOUS at 15:36

## 2018-06-13 RX ADMIN — FUROSEMIDE 20 MG/HR: 10 INJECTION, SOLUTION INTRAVENOUS at 10:24

## 2018-06-13 RX ADMIN — ISOSORBIDE DINITRATE 10 MG: 10 TABLET ORAL at 19:20

## 2018-06-13 RX ADMIN — ASPIRIN 81 MG: 81 TABLET, COATED ORAL at 09:09

## 2018-06-13 RX ADMIN — SACUBITRIL AND VALSARTAN 1 TABLET: 24; 26 TABLET, FILM COATED ORAL at 19:20

## 2018-06-13 RX ADMIN — FUROSEMIDE 20 MG/HR: 10 INJECTION, SOLUTION INTRAVENOUS at 04:39

## 2018-06-13 RX ADMIN — HYDRALAZINE HYDROCHLORIDE 10 MG: 10 TABLET, FILM COATED ORAL at 19:20

## 2018-06-13 RX ADMIN — HYDRALAZINE HYDROCHLORIDE 10 MG: 10 TABLET, FILM COATED ORAL at 15:36

## 2018-06-13 RX ADMIN — ALUMINUM HYDROXIDE, MAGNESIUM HYDROXIDE, AND DIMETHICONE 30 ML: 400; 400; 40 SUSPENSION ORAL at 21:11

## 2018-06-13 RX ADMIN — FUROSEMIDE 10 MG/HR: 10 INJECTION, SOLUTION INTRAVENOUS at 18:05

## 2018-06-13 ASSESSMENT — PAIN DESCRIPTION - DESCRIPTORS: DESCRIPTORS: CRAMPING

## 2018-06-13 NOTE — PROGRESS NOTES
CLINICAL NUTRITION SERVICES    Reason for Assessment:  Low-sodium (2 g/day) nutrition education, received consult    Diet History:  Pt reports receiving low-sodium nutrition education in the past. Pt known to this service from prior admission. Per chart review, pt and family received nutrition education on 3/7/17. Per chart, medication non-compliance and dietary indiscretion PTA. Per pt's sister, pt lives alone and it is more difficult to eat low-sodium choices. Pt and sister are agreeable to additional nutrition education and they have some questions.    Nutrition Diagnosis:  Food- and nutrition-related knowledge deficit r/t knowledge deficit of low-sodium diet AEB pt and family have questions on low-sodium nutrition education.    Nutrition Prescription/Recs:  Rec low-sodium diet, fluid restriction per team.     Interventions:  Nutrition Education  1. Provided verbal reinforcement on a heart-healthy diet with emphasis on low-sodium meal planning. Gave ideas of quickly prepared foods to eat that are low in sodium.   2. Provided the following handouts: How to Read Nutrition Labels, Low-Sodium Foods and Drinks, Tips for a Low-Sodium Diet, Seasoning Your Foods Without Adding Salt, and Managing Fluid Restriction.      Goals:    Pt will verbalize at least five high sodium foods and the importance of avoiding added salt to foods for cooking or seasoning foods.     Follow-up:   Patient to ask any further nutrition-related questions before discharge. In addition, pt may request outpatient RD appointment.     Rachel Bradshaw, MS, RD, LD, [facility]   6C Pgr: [phone]

## 2018-06-13 NOTE — PLAN OF CARE
Problem: Patient Care Overview  Goal: Plan of Care/Patient Progress Review  Outcome: Improving  D: Pt with h/y of  HF (EF 30%), NICM, ICD on17, TR, DM2, HTN,SHIRLEY,came with progressive fatigue,fluid overload per note.      I: Monitored vitals and assessed pt status.   Running: Lasix at 20 mg/hr (20 ml.hr)  PRN: Potassium 20 Meq.      A: A0x4. VSS, on 1 L nc overnight per pt request. SR, PVC. Afebrile.  Lytes replaced per protocol. K= 3.6 (+20_mEq)  Significant UO. Refused from CPAP, used nc overnight with O2 sat at 97-99%.  Reported legs cramp ,potassium level was rechecked, 3.6, replaced per replacement protocol.    Temp:  [97.2  F (36.2  C)-98.3  F (36.8  C)] 97.2  F (36.2  C)  Heart Rate:  [86-98] 87  Resp:  [16-18] 16  BP: ()/(64-90) 96/79  SpO2:  [97 %-99 %] 99 %      P: Continue to monitor Pt status and report changes to treatment team.

## 2018-06-13 NOTE — PROGRESS NOTES
CLINICAL NUTRITION SERVICES    Received an admission nutrition risk screen for unintentional loss of 10# or more in the past two months. Wt hx: 99.6 kg (5/3/17), 104.1 kg (6/15/17), 98.9 kg (2/22/18), 98.7 kg (5/11/18), 97.5 kg (6/11/18, admission) - No significant or severe wt loss PTA. Wt may vary at times, pending fluid status. Diuresis this admission.      Follow Up / Monitoring:   Per protocol    Rachel Bradshaw, MS, RD, LD, [facility]   6C Pgr:  [phone]

## 2018-06-13 NOTE — PROGRESS NOTES
Cardiology Progress Note  Danielito Chu MRN: [id number]  Age: 63 year old, : 1955  Date: 2018              Subjective     ROMULO overnight. Continues to feel improved. Notes he has not felt this good for several months. Ambulating without difficulty. No f/c, chest pain or n/v. Tolerating PO intake. Notes diffuse cramps.           Objective     B/P: 101/68, T: 97.5, P: 87, R: 18    Intake/Output Summary (Last 24 hours) at 18 0738  Last data filed at 18 0717   Gross per 24 hour   Intake           133.74 ml   Output             3345 ml   Net         -3211.26 ml     Vitals:    18 1705 18 0600 18 0237   Weight: 99.1 kg (218 lb 8 oz) 97.2 kg (214 lb 3.2 oz) 93.5 kg (206 lb 3.2 oz)         Gen: AA&Ox3, no acute distress  HEENT: JVP  Mid neck.   BACK: no CVA tenderness, no midline bony tenderness  PULM/THORAX: Clear to auscultation bilaterally, no rales/rhonchi/wheezes  CV:RRR, S1 and S2 appreciated, no extra heart sounds, murmurs or rub auscultated. No JVD  ABD: obese, soft, nontender, nondistended. Normoactive bowel sounds x 4, no HSM appreciated.  EXT: 1+ pitting edema to shins b/l, no clubbing or cyanosis. No asymmetrical edema or tenderness to palpation in calves bilaterally.  NEURO: No focal deficits.             Data:     LABS reviewed and pertinent for:       Lab Results   Component Value Date     2018    Lab Results   Component Value Date    CHLORIDE 99 2018    Lab Results   Component Value Date    BUN 22 2018      Lab Results   Component Value Date    POTASSIUM 3.9 2018    Lab Results   Component Value Date    CO2 30 2018    Lab Results   Component Value Date    CR 1.13 2018        Lab Results   Component Value Date    NTBNPI 3382 (H) 2018    NTBNP 1671 (H) 2018     Lab Results   Component Value Date    WBC 8.2 2018    HGB 15.4 2018    HCT 48.2 2018    MCV 93  06/13/2018     06/13/2018     Lab Results   Component Value Date     (H) 06/13/2018               Medications     Current Facility-Administered Medications   Medication     aspirin EC tablet 81 mg     atorvastatin (LIPITOR) tablet 10 mg     Continuing ACE inhibitor/ARB/ARNI from home medication list OR ACE inhibitor/ARB/ARNI order already placed during this visit     Continuing beta blocker from home medication list OR beta blocker order already placed during this visit     digoxin (LANOXIN) tablet 250 mcg     furosemide (LASIX) 100 mg in sodium chloride 0.9 % 100 mL infusion     heparin sodium PF injection 5,000 Units     HOLD nitroGLYcerin IF     hydrALAZINE (APRESOLINE) tablet 10 mg     isosorbide dinitrate (ISORDIL) tablet 10 mg     lidocaine (LMX4) kit     lidocaine 1 % 1 mL     magnesium sulfate 2 g in water intermittent infusion     magnesium sulfate 4 g in 100 mL sterile water (premade)     potassium chloride (KLOR-CON) Packet 20-40 mEq     potassium chloride 10 mEq in 100 mL sterile water intermittent infusion (premix)     potassium chloride 20 mEq in 100 mL NON-STANDARD CONC intermittent infusion     potassium chloride SA (K-DUR/KLOR-CON M) CR tablet 20-40 mEq     sacubitril-valsartan (ENTRESTO) 24-26 MG per tablet 1 tablet     sodium chloride (PF) 0.9% PF flush 3 mL     sodium chloride (PF) 0.9% PF flush 3 mL     traZODone (DESYREL) tablet 100 mg           Assessment and Plan:     Danielito Chu is a 63 year old man with a PMHx of HFrEF (EF 30% June '17) 2/2 to NICM s/p ICD 4/7/17, mild to moderate TR, DMII, HTN and SHIRLEY who presents with recent hx of progressive fatigue s/p outpt RHC c/w elevated filling pressures and reduced cardiac outpt c/w with CHF exacerbation.      Plan Today:  -decrease lasix gtt 20->10 given robust UOP   -f/u afternoon BMP and I/O's, consider further backing off diuresis     # Acute on Chronic HFrEF (last EF 15%) 2/2 NICM s/p ICD  Stage D, NYHA Class III  NICM  of unclear etiology, possibly viral in origin. [facility] 6/11: CI 1.3 CO 2.8 RA 26/32/20, PA 45/25/32, PCWP 30/37/25, SVR 1860, prompting admission for decompensated HF. Decompensated state is in the setting of medication non-compliance and dietary indiscretion.  Admitted for volume status optimization.   -lasix gtt at 20 mg/hr with bolus (On Torsemide 20 mg po daily at home)    -Afterload reduce: Hydralazine 10 mg TID and isordil 10 mg TID   -ACEi/ARB: Entresto 49/51 mg po BID, could uptitrate if pt tolerates     -BB: hold given decreased CO/CI (on Toprol XL 25 mg po daily)    -Aldosterone antagonist: Aldactone discontinued in setting of hyperkalemia   -SCD prophylaxis ICD  -strict I/O's, daily weights and Na/fluid restricted diet   -repeat TTE once closer to euvolemic      # Depressed mood  In the setting of progressive HFrEF, no SI or HI.  -offered medication mgmt vs therapy/psychiatry or health psychology consult and pt declined   -mood improved HD#2 with HF mgmt     ===Chronic Medical Problems===     # Sleep Apnea   - CPAP     # HTN  -hydralazine and isordil as above   -holding entresto as above      # HLD  -continue PTA statin      # DMII (Hgb A1C 6.8 6/11/18)  -hold PTA metformin   -monitor BG's, start low dose SSI if elevated      # CAD ppx  -continue PTA statin      FEN: Cardiac diet   PPX: Heparin subq     Code Status: FULL CODE      Patient discussed with staff attending, Dr. Watkins.  Please feel free to page with questions.     Alan Chung MD  PGY-2 Internal Medicine  Cardiology Service  Pager: [phone]

## 2018-06-13 NOTE — PROGRESS NOTES
Care Coordinator Progress Note    Admission Date/Time:  6/11/2018  Attending MD:  Lorena Watkins  Data  Chart reviewed, discussed with interdisciplinary team.   Patient was admitted for: Acute systolic congestive heart failure (H).    Concerns with insurance coverage for discharge needs: None.  Current Living Situation: Patient lives alone. Pt said that he is independent with his own care.   Support System: Supportive and Involved sisterFani.   Services Involved: none currently.   Transportation at Discharge: Fani will provide pt with a ride home.   Transportation to Medical Appointments: family to provide.   Barriers to Discharge: medical condition.     Coordination of Care and Referrals: No home care needs per pt.    Assessment  Pt said that his sister, Fani is planning to stay with pt for a while post hospitalization.   OT recommending OPCR; pt is in agreement with this plan.    Plan  Anticipated Discharge Date:  TBD  Anticipated Discharge Plan:  Discharge to home with outpt f/u.     GILBERT SOTO RN BSN  Care Coordinator Unit   [phone]

## 2018-06-13 NOTE — PLAN OF CARE
Problem: Patient Care Overview  Goal: Plan of Care/Patient Progress Review  OT/CR 6C  Discharge Planner OT   Patient plan for discharge: Home   Current status: SBA sit<>stand. Pt ambulated ~2000ft with no AD and SBA.   Barriers to return to prior living situation: acute medical needs  Recommendations for discharge: Home with OP CR  Rationale for recommendations: Pt is independent with ADLs, however would benefit from continued skilled therapy to increase exercise endurance and heart failure management       Entered by: Dotty PITTS Retterath 06/13/2018 11:58 AM

## 2018-06-14 LAB
ANION GAP SERPL CALCULATED.3IONS-SCNC: 10 MMOL/L (ref 3–14)
ANION GAP SERPL CALCULATED.3IONS-SCNC: 11 MMOL/L (ref 3–14)
BUN SERPL-MCNC: 19 MG/DL (ref 7–30)
BUN SERPL-MCNC: 22 MG/DL (ref 7–30)
CALCIUM SERPL-MCNC: 9.2 MG/DL (ref 8.5–10.1)
CALCIUM SERPL-MCNC: 9.6 MG/DL (ref 8.5–10.1)
CHLORIDE SERPL-SCNC: 95 MMOL/L (ref 94–109)
CHLORIDE SERPL-SCNC: 96 MMOL/L (ref 94–109)
CO2 SERPL-SCNC: 28 MMOL/L (ref 20–32)
CO2 SERPL-SCNC: 28 MMOL/L (ref 20–32)
CREAT SERPL-MCNC: 1.02 MG/DL (ref 0.66–1.25)
CREAT SERPL-MCNC: 1.15 MG/DL (ref 0.66–1.25)
ERYTHROCYTE [DISTWIDTH] IN BLOOD BY AUTOMATED COUNT: 18.9 % (ref 10–15)
GFR SERPL CREATININE-BSD FRML MDRD: 64 ML/MIN/1.7M2
GFR SERPL CREATININE-BSD FRML MDRD: 74 ML/MIN/1.7M2
GLUCOSE SERPL-MCNC: 117 MG/DL (ref 70–99)
GLUCOSE SERPL-MCNC: 159 MG/DL (ref 70–99)
HCT VFR BLD AUTO: 54.2 % (ref 40–53)
HGB BLD-MCNC: 17.8 G/DL (ref 13.3–17.7)
INR PPP: 1.26 (ref 0.86–1.14)
MAGNESIUM SERPL-MCNC: 2.7 MG/DL (ref 1.6–2.3)
MCH RBC QN AUTO: 30.1 PG (ref 26.5–33)
MCHC RBC AUTO-ENTMCNC: 32.8 G/DL (ref 31.5–36.5)
MCV RBC AUTO: 92 FL (ref 78–100)
PLATELET # BLD AUTO: 249 10E9/L (ref 150–450)
POTASSIUM SERPL-SCNC: 4 MMOL/L (ref 3.4–5.3)
POTASSIUM SERPL-SCNC: 4.2 MMOL/L (ref 3.4–5.3)
POTASSIUM SERPL-SCNC: 4.3 MMOL/L (ref 3.4–5.3)
RBC # BLD AUTO: 5.92 10E12/L (ref 4.4–5.9)
SODIUM SERPL-SCNC: 133 MMOL/L (ref 133–144)
SODIUM SERPL-SCNC: 135 MMOL/L (ref 133–144)
WBC # BLD AUTO: 8 10E9/L (ref 4–11)

## 2018-06-14 PROCEDURE — 83735 ASSAY OF MAGNESIUM: CPT | Performed by: INTERNAL MEDICINE

## 2018-06-14 PROCEDURE — 84132 ASSAY OF SERUM POTASSIUM: CPT | Performed by: INTERNAL MEDICINE

## 2018-06-14 PROCEDURE — 85027 COMPLETE CBC AUTOMATED: CPT | Performed by: INTERNAL MEDICINE

## 2018-06-14 PROCEDURE — 85610 PROTHROMBIN TIME: CPT | Performed by: INTERNAL MEDICINE

## 2018-06-14 PROCEDURE — 99232 SBSQ HOSP IP/OBS MODERATE 35: CPT | Mod: GC | Performed by: INTERNAL MEDICINE

## 2018-06-14 PROCEDURE — 25000132 ZZH RX MED GY IP 250 OP 250 PS 637: Performed by: INTERNAL MEDICINE

## 2018-06-14 PROCEDURE — 80048 BASIC METABOLIC PNL TOTAL CA: CPT | Performed by: INTERNAL MEDICINE

## 2018-06-14 PROCEDURE — 25000132 ZZH RX MED GY IP 250 OP 250 PS 637: Performed by: STUDENT IN AN ORGANIZED HEALTH CARE EDUCATION/TRAINING PROGRAM

## 2018-06-14 PROCEDURE — 21400006 ZZH R&B CCU INTERMEDIATE UMMC

## 2018-06-14 PROCEDURE — 25000128 H RX IP 250 OP 636: Performed by: INTERNAL MEDICINE

## 2018-06-14 PROCEDURE — 36415 COLL VENOUS BLD VENIPUNCTURE: CPT | Performed by: INTERNAL MEDICINE

## 2018-06-14 PROCEDURE — 25000132 ZZH RX MED GY IP 250 OP 250 PS 637

## 2018-06-14 RX ORDER — TORSEMIDE 20 MG/1
20 TABLET ORAL
Status: DISCONTINUED | OUTPATIENT
Start: 2018-06-14 | End: 2018-06-15

## 2018-06-14 RX ORDER — SIMETHICONE 80 MG
80 TABLET,CHEWABLE ORAL EVERY 6 HOURS PRN
Status: DISCONTINUED | OUTPATIENT
Start: 2018-06-14 | End: 2018-06-15 | Stop reason: HOSPADM

## 2018-06-14 RX ADMIN — ATORVASTATIN CALCIUM 10 MG: 10 TABLET, FILM COATED ORAL at 19:35

## 2018-06-14 RX ADMIN — ISOSORBIDE DINITRATE 10 MG: 10 TABLET ORAL at 09:00

## 2018-06-14 RX ADMIN — ISOSORBIDE DINITRATE 10 MG: 10 TABLET ORAL at 19:35

## 2018-06-14 RX ADMIN — POTASSIUM CHLORIDE 20 MEQ: 750 TABLET, EXTENDED RELEASE ORAL at 16:28

## 2018-06-14 RX ADMIN — HYDRALAZINE HYDROCHLORIDE 10 MG: 10 TABLET, FILM COATED ORAL at 09:00

## 2018-06-14 RX ADMIN — TORSEMIDE 20 MG: 20 TABLET ORAL at 16:28

## 2018-06-14 RX ADMIN — HEPARIN SODIUM 5000 UNITS: 5000 INJECTION, SOLUTION INTRAVENOUS; SUBCUTANEOUS at 05:35

## 2018-06-14 RX ADMIN — SACUBITRIL AND VALSARTAN 1 TABLET: 24; 26 TABLET, FILM COATED ORAL at 09:01

## 2018-06-14 RX ADMIN — HYDRALAZINE HYDROCHLORIDE 10 MG: 10 TABLET, FILM COATED ORAL at 21:22

## 2018-06-14 RX ADMIN — SIMETHICONE CHEW TAB 80 MG 80 MG: 80 TABLET ORAL at 16:28

## 2018-06-14 RX ADMIN — ISOSORBIDE DINITRATE 10 MG: 10 TABLET ORAL at 14:06

## 2018-06-14 RX ADMIN — DIGOXIN 250 MCG: 125 TABLET ORAL at 09:00

## 2018-06-14 RX ADMIN — TRAZODONE HYDROCHLORIDE 100 MG: 100 TABLET ORAL at 23:55

## 2018-06-14 RX ADMIN — HYDRALAZINE HYDROCHLORIDE 10 MG: 10 TABLET, FILM COATED ORAL at 14:06

## 2018-06-14 RX ADMIN — TORSEMIDE 20 MG: 20 TABLET ORAL at 12:23

## 2018-06-14 RX ADMIN — SACUBITRIL AND VALSARTAN 1 TABLET: 24; 26 TABLET, FILM COATED ORAL at 19:35

## 2018-06-14 RX ADMIN — ASPIRIN 81 MG: 81 TABLET, COATED ORAL at 09:00

## 2018-06-14 RX ADMIN — FUROSEMIDE 10 MG/HR: 10 INJECTION, SOLUTION INTRAVENOUS at 02:53

## 2018-06-14 NOTE — PLAN OF CARE
Problem: Patient Care Overview  Goal: Plan of Care/Patient Progress Review  Outcome: Improving  D/A/I:  Patient A&O x4, denied palpitations, difficulty breathing, SOB, dizziness, and nausea.  Lung sounds clear to auscultation, no abnormal heart sounds noted, no edema noted in legs and feet.  Furosemide gtt decreased to 10 mg/hr (10 ml/hr).  Patient had significant urine output, see I&O flowsheet.  In sinus rhythm with occasional tachycardia and PVCs, HR 80s-100s, SBP 70s-100s, SaO2 95-97% on room air.  Potassium replaced per protocol.  Reported generalized cramping that improved with potassium replacement.  Ambulated independently in hallway with steady gait.  Sister visited during shift.    P:  Continue to monitor pain, VS, heart rhythm, fluid status, cardiac and respiratory status.  Notify care team of changes in patient condition or other concerns.  Continue diuresis.

## 2018-06-14 NOTE — PROGRESS NOTES
Cardiology Progress Note    Assessment & Plan   63 year old man with a PMHx of HFrEF (EF 30% June '17) 2/2 to NICM s/p ICD 4/7/17, mild to moderate TR, DMII, HTN and SHIRLEY who presents with recent hx of progressive fatigue s/p outpt RHC c/w elevated filling pressures and reduced cardiac outpt c/w with CHF exacerbation.       Plan Today:  -transition to oral diuretics torsemide 20mg BID (at home on 20mg OD) if by the afternoon not adequate output will increase to 20mg in the am 40 mg in the pm  -observe UOP overnight and likely d/c tomorrow with core follow up next week     # Acute on Chronic HFrEF (last EF 15%) 2/2 NICM s/p ICD  Stage D, NYHA Class III   [facility] 6/11: CI 1.3 CO 2.8 RA 26/32/20, PA 45/25/32, PCWP 30/37/25, SVR 1860, in the setting of medication non-compliance and dietary indiscretion.  On Torsemide 20 mg po daily at home    -Afterload reduce: Hydralazine 10 mg TID and isordil 10 mg TID, entresto 49/51mg without much bp to uptitrate   -BB: hold given decreased CO/CI (on Toprol XL 25 mg po daily)  and narrow pulse pressure  -Aldosterone antagonist: Aldactone discontinued in setting of hyperkalemia   -SCD prophylaxis ICD  - doubled home lasix dose        ===Chronic Medical Problems===  # Sleep Apnea - CPAP  # HTN -medications as above  # HLD-continue PTA statin    # DMII (Hgb A1C 6.8 6/11/18) -hold PTA metformin   -monitor BG's, start low dose SSI if elevated   # CAD ppx-continue PTA statin       FEN: Cardiac diet   PPX: Heparin subq  Code Status: FULL CODE      Patient discussed with staff attending, Dr. Watkins    Interval History   He did well overnight   SCr down from 1.21 to 1 BUN at 18-19  INR 1.2-1.3   net negative 3.360LPM weight decreased 215lbs to 199lbs    Physical Exam   Temp: 97.2  F (36.2  C) Temp src: Oral BP: (!) 89/56 Pulse: 79 Heart Rate: 84 Resp: 16 SpO2: 97 % O2 Device: None (Room air) Oxygen Delivery: 1 LPM  Vitals:    06/12/18 0600 06/13/18 0237 06/14/18 0100   Weight: 97.2 kg (214 lb  "3.2 oz) 93.5 kg (206 lb 3.2 oz) 90.3 kg (199 lb 1.6 oz)     Vital Signs with Ranges  Temp:  [97.1  F (36.2  C)-98.4  F (36.9  C)] 97.2  F (36.2  C)  Pulse:  [79] 79  Heart Rate:  [79-88] 84  Resp:  [16-18] 16  BP: ()/(56-81) 89/56  SpO2:  [94 %-98 %] 97 %  I/O last 3 completed shifts:  In: 1259.17 [P.O.:960; I.V.:299.17]  Out: 2650 [Urine:2650]    Heart Rate: 84, Blood pressure (!) 89/56, pulse 79, temperature 97.2  F (36.2  C), temperature source Oral, resp. rate 16, height 1.753 m (5' 9\"), weight 90.3 kg (199 lb 1.6 oz), SpO2 97 %.  199 lbs 1.6 oz  GEN:  Alert, oriented x 3, appears comfortable, NAD.  CV:  Regular rate and rhythm, no murmur flat JVD.    LUNGS:  Clear to auscultation bilaterally   ABD:  Active bowel sounds, soft, non-tender/non-distended.  No rebound/guarding/rigidity.  EXT:  No edema or cyanosis.      Medications     - MEDICATION INSTRUCTIONS -       - MEDICATION INSTRUCTIONS -         aspirin  81 mg Oral Daily     atorvastatin  10 mg Oral Daily at 8 pm     digoxin  250 mcg Oral Daily     hydrALAZINE  10 mg Oral TID     isosorbide dinitrate  10 mg Oral TID     sacubitril-valsartan  1 tablet Oral BID     sodium chloride (PF)  3 mL Intracatheter Q8H     torsemide  20 mg Oral BID       Data     Recent Labs  Lab 06/14/18  0441 06/14/18  0216 06/13/18  1507 06/13/18  0611  06/12/18  0652 06/11/18  1946   WBC 8.0  --   --  8.2  --  7.3 6.2   HGB 17.8*  --   --  15.4  --  14.9 15.2   MCV 92  --   --  93  --  94 93     --   --  204  --  183 189   INR 1.26*  --   --  1.37*  --  1.58*  --      --  136 136  < > 136 136   POTASSIUM 4.2 4.3 4.2 3.9  < > 3.7 3.9   CHLORIDE 95  --  97 98  < > 99 99   CO2 28  --  33* 27  < > 27 30   BUN 19  --  18 20  < > 21 22   CR 1.02  --  1.21 1.09  < > 1.04 1.13   ANIONGAP 11  --  6 11  < > 10 6   ASHLEE 9.6  --  9.3 9.3  < > 8.9 8.8   *  --  107* 108*  < > 174* 190*   ALBUMIN  --   --   --  3.7  --   --  3.4   PROTTOTAL  --   --   --  8.1  --   --  " 7.5   BILITOTAL  --   --   --  3.0*  --  3.2* 3.1*   ALKPHOS  --   --   --  95  --   --  86   ALT  --   --   --  16  --   --  13   AST  --   --   --  25  --   --  18   < > = values in this interval not displayed.    No results found for this or any previous visit (from the past 24 hour(s)).

## 2018-06-14 NOTE — PLAN OF CARE
Problem: Patient Care Overview  Goal: Plan of Care/Patient Progress Review  Outcome: Improving    D: admitted 6/11 s/p RHC, hx HF with EF 30%, NICM s/p ICD 4/2017, TR, DM2, HTN, SHIRLEY     I/A:   Neuro- A&Ox4, ind  CV- SR 70-90s, BPs 100-110s/80s, occasional PVCs.  Pulm- sating well on RA, denies SOB or CP, used 1 LPM overnight, declined CPAP  GI/-cardiac diet, 2 L FR. BM 6/13, voiding well.  LDA- L PIV w/ furosemide @ 10mL/hr  Skin- no concerns  Pain- denies     K recheck this AM.      P:  Continue to monitor and notify CARDS 2 with any changes or concerns.

## 2018-06-15 ENCOUNTER — APPOINTMENT (OUTPATIENT)
Dept: OCCUPATIONAL THERAPY | Facility: CLINIC | Age: 63
DRG: 287 | End: 2018-06-15
Attending: INTERNAL MEDICINE
Payer: COMMERCIAL

## 2018-06-15 VITALS
OXYGEN SATURATION: 95 % | HEIGHT: 69 IN | TEMPERATURE: 97.5 F | WEIGHT: 201.6 LBS | SYSTOLIC BLOOD PRESSURE: 92 MMHG | HEART RATE: 101 BPM | DIASTOLIC BLOOD PRESSURE: 61 MMHG | BODY MASS INDEX: 29.86 KG/M2 | RESPIRATION RATE: 18 BRPM

## 2018-06-15 LAB
ANION GAP SERPL CALCULATED.3IONS-SCNC: 8 MMOL/L (ref 3–14)
BUN SERPL-MCNC: 25 MG/DL (ref 7–30)
CALCIUM SERPL-MCNC: 8.7 MG/DL (ref 8.5–10.1)
CHLORIDE SERPL-SCNC: 98 MMOL/L (ref 94–109)
CO2 SERPL-SCNC: 25 MMOL/L (ref 20–32)
CREAT SERPL-MCNC: 1 MG/DL (ref 0.66–1.25)
ERYTHROCYTE [DISTWIDTH] IN BLOOD BY AUTOMATED COUNT: 18.4 % (ref 10–15)
GFR SERPL CREATININE-BSD FRML MDRD: 76 ML/MIN/1.7M2
GLUCOSE SERPL-MCNC: 116 MG/DL (ref 70–99)
HCT VFR BLD AUTO: 53.1 % (ref 40–53)
HGB BLD-MCNC: 17.3 G/DL (ref 13.3–17.7)
INR PPP: 1.23 (ref 0.86–1.14)
MAGNESIUM SERPL-MCNC: 2.5 MG/DL (ref 1.6–2.3)
MCH RBC QN AUTO: 29.6 PG (ref 26.5–33)
MCHC RBC AUTO-ENTMCNC: 32.6 G/DL (ref 31.5–36.5)
MCV RBC AUTO: 91 FL (ref 78–100)
PLATELET # BLD AUTO: 235 10E9/L (ref 150–450)
POTASSIUM SERPL-SCNC: 4.1 MMOL/L (ref 3.4–5.3)
RBC # BLD AUTO: 5.84 10E12/L (ref 4.4–5.9)
SODIUM SERPL-SCNC: 131 MMOL/L (ref 133–144)
WBC # BLD AUTO: 7.9 10E9/L (ref 4–11)

## 2018-06-15 PROCEDURE — 97535 SELF CARE MNGMENT TRAINING: CPT | Mod: GO | Performed by: OCCUPATIONAL THERAPIST

## 2018-06-15 PROCEDURE — 85610 PROTHROMBIN TIME: CPT | Performed by: INTERNAL MEDICINE

## 2018-06-15 PROCEDURE — 80048 BASIC METABOLIC PNL TOTAL CA: CPT | Performed by: INTERNAL MEDICINE

## 2018-06-15 PROCEDURE — 36415 COLL VENOUS BLD VENIPUNCTURE: CPT | Performed by: INTERNAL MEDICINE

## 2018-06-15 PROCEDURE — 40000133 ZZH STATISTIC OT WARD VISIT: Performed by: OCCUPATIONAL THERAPIST

## 2018-06-15 PROCEDURE — 83735 ASSAY OF MAGNESIUM: CPT | Performed by: INTERNAL MEDICINE

## 2018-06-15 PROCEDURE — 25000132 ZZH RX MED GY IP 250 OP 250 PS 637: Performed by: INTERNAL MEDICINE

## 2018-06-15 PROCEDURE — 99238 HOSP IP/OBS DSCHRG MGMT 30/<: CPT | Mod: GC | Performed by: INTERNAL MEDICINE

## 2018-06-15 PROCEDURE — 85027 COMPLETE CBC AUTOMATED: CPT | Performed by: INTERNAL MEDICINE

## 2018-06-15 PROCEDURE — 25000132 ZZH RX MED GY IP 250 OP 250 PS 637: Performed by: STUDENT IN AN ORGANIZED HEALTH CARE EDUCATION/TRAINING PROGRAM

## 2018-06-15 RX ORDER — TORSEMIDE 20 MG/1
20 TABLET ORAL EVERY EVENING
Qty: 90 TABLET | Refills: 3 | Status: SHIPPED | OUTPATIENT
Start: 2018-06-15 | End: 2018-06-22 | Stop reason: DRUGHIGH

## 2018-06-15 RX ORDER — POTASSIUM CHLORIDE 750 MG/1
20 TABLET, EXTENDED RELEASE ORAL DAILY
Status: DISCONTINUED | OUTPATIENT
Start: 2018-06-15 | End: 2018-06-15 | Stop reason: HOSPADM

## 2018-06-15 RX ORDER — HYDRALAZINE HYDROCHLORIDE 10 MG/1
10 TABLET, FILM COATED ORAL 3 TIMES DAILY
Qty: 120 TABLET | Refills: 3 | Status: SHIPPED | OUTPATIENT
Start: 2018-06-15 | End: 2018-06-15

## 2018-06-15 RX ORDER — TORSEMIDE 20 MG/1
40 TABLET ORAL EVERY MORNING
Qty: 90 TABLET | Refills: 3 | Status: SHIPPED | OUTPATIENT
Start: 2018-06-15 | End: 2018-06-22 | Stop reason: DRUGHIGH

## 2018-06-15 RX ORDER — ISOSORBIDE DINITRATE 10 MG/1
10 TABLET ORAL 3 TIMES DAILY
Qty: 180 TABLET | Refills: 3 | Status: SHIPPED | OUTPATIENT
Start: 2018-06-15 | End: 2018-06-15

## 2018-06-15 RX ORDER — TORSEMIDE 20 MG/1
20 TABLET ORAL EVERY EVENING
Status: DISCONTINUED | OUTPATIENT
Start: 2018-06-15 | End: 2018-06-15 | Stop reason: HOSPADM

## 2018-06-15 RX ORDER — TORSEMIDE 20 MG/1
40 TABLET ORAL EVERY MORNING
Status: DISCONTINUED | OUTPATIENT
Start: 2018-06-15 | End: 2018-06-15 | Stop reason: HOSPADM

## 2018-06-15 RX ORDER — POTASSIUM CHLORIDE 1500 MG/1
20 TABLET, EXTENDED RELEASE ORAL DAILY
Qty: 90 TABLET | Refills: 3 | Status: SHIPPED | OUTPATIENT
Start: 2018-06-15 | End: 2018-06-29

## 2018-06-15 RX ORDER — TORSEMIDE 20 MG/1
40 TABLET ORAL EVERY MORNING
Qty: 30 TABLET | Refills: 3 | Status: SHIPPED | OUTPATIENT
Start: 2018-06-15 | End: 2018-06-22 | Stop reason: DRUGHIGH

## 2018-06-15 RX ORDER — TORSEMIDE 20 MG/1
20 TABLET ORAL EVERY EVENING
Qty: 30 TABLET | Refills: 3 | Status: SHIPPED | OUTPATIENT
Start: 2018-06-15 | End: 2018-06-22

## 2018-06-15 RX ADMIN — ASPIRIN 81 MG: 81 TABLET, COATED ORAL at 09:34

## 2018-06-15 RX ADMIN — POTASSIUM CHLORIDE 20 MEQ: 750 TABLET, EXTENDED RELEASE ORAL at 09:49

## 2018-06-15 RX ADMIN — TORSEMIDE 40 MG: 20 TABLET ORAL at 09:33

## 2018-06-15 RX ADMIN — DIGOXIN 250 MCG: 125 TABLET ORAL at 09:35

## 2018-06-15 RX ADMIN — ISOSORBIDE DINITRATE 10 MG: 10 TABLET ORAL at 09:35

## 2018-06-15 RX ADMIN — SACUBITRIL AND VALSARTAN 1 TABLET: 24; 26 TABLET, FILM COATED ORAL at 09:34

## 2018-06-15 NOTE — PLAN OF CARE
Problem: Patient Care Overview  Goal: Plan of Care/Patient Progress Review  Occupational Therapy Discharge Summary    Reason for therapy discharge:    Discharged to home with outpatient therapy.    Progress towards therapy goal(s). See goals on Care Plan in [facility] electronic health record for goal details.  Goals partially met.  Barriers to achieving goals:   discharge from facility.    Therapy recommendation(s):    Continued therapy is recommended.  Rationale/Recommendations:  Recommend discharge to home with OP Phase II CR at [facility] in [place].

## 2018-06-15 NOTE — PLAN OF CARE
Problem: Patient Care Overview  Goal: Plan of Care/Patient Progress Review  OT/CR 6C: Discharge Planner OT   Patient plan for discharge: Home with OP CR  Current status: Pt is safe and independent with basic mobility and self cares; activity tolerance limited by fatigue and deconditioning.  Barriers to return to prior living situation: none  Recommendations for discharge: Home with OP Phase II CR at [facility] in [place], [place] - Order placed  Rationale for recommendations: Pt is safe to return to home.  Will benefit from OP CR to safely progress aerobic conditioning and maximize cardiac outcomes.       Entered by: Marta Newton 06/15/2018 10:11 AM

## 2018-06-15 NOTE — PROGRESS NOTES
Brief  Note:    Pt and spouse Fani requested SW visit to discuss long term disability. Pt is currently receiving short term disability but this will be expiring soon. Pt and spouse were worried about a delay in getting medical records, resulting in a delay in getting long term disability. Pt received his daily MD open notes, and SW completed a Release of Information to request his [facility] records be mailed and emailed to pt and spouse as soon as possible. SW faxed a copy of MEKHI to HIM, placed original in hard chart, and gave copy to pt/spouse.    JACK Paul, [facility]- Assisting Kim Collins on 6C.  6B Intermediate Care Unit  Phone: [phone]  Pager: [phone]

## 2018-06-15 NOTE — DISCHARGE SUMMARY
Cards 2  Discharge Summary    Danielito Chu MRN# [id number]   YOB: 1955 Age: 63 year old           Date of Admission:  6/11/2018  Date of Discharge:  6/15/2018   Admitting Physician:  Lorena Watkins MD  Discharge Physician:  Dr. Lincoln   Discharging Service:  Cards 2     Primary Provider: Bashir Hines         Reason for Admission:   63 year old man with a PMHx of HFrEF (EF 30% June '17) 2/2 to NICM s/p ICD 4/7/17, mild to moderate TR, DMII, HTN and SHIRLEY who presents with recent hx of progressive fatigue s/p outpt RHC c/w elevated filling pressures and reduced cardiac outpt c/w with CHF exacerbation 2/2 to med non-compliance.     ** Please see the admission H&P from 6/11/2018 for full details of this presentation.          Discharge Diagnoses:     # Acute on Chronic HFrEF (last EF 15%) 2/2 NICM s/p ICD, Stage D, NYHA Class III  # Depressed mood         Procedures, Imaging & Significant Findings:     RHC 6/11  Results (pressures in mmHg)  RA: 26/32/20  RV 45/20  PA 45/25/32;  PA Sat 48.4%  PCWP 30/37/25;  PCW Sat 96.8%  Timmy CO/CI 2.8/1.3 L/min; TD CO 3.0/1.4 L/min  PVR 2.5 bonilla; TPR 11.4 bonilla  Hb 14.5 g/dL  NIBP 108/80/90  SVR 1860 dynes*sec/cm^5     Complications: None   Blood loss: Minimal blood loss     Conclusions:  1. Severely elevated right and moderately elevated left sided filling pressures.  2. Mildly elevated pulmonary artery pressures.  3. Moderately reduced cardiac output.         Consultations this Admission:     None          Hospital Course by Problem:      # Acute on Chronic HFrEF (last EF 15%) 2/2 NICM s/p ICD  Stage D, NYHA Class III   RHC 6/11: CI 1.3 CO 2.8 RA 26/32/20, PA 45/25/32, PCWP 30/37/25, SVR 1860, in the setting of medication non-compliance and dietary indiscretion.  On Torsemide 20 mg po daily at home. Diuresed ~25lbs, down to 200 lbs, likely his new dry weight.   -Afterload reduce: entresto 49/51mg, received hydralazine and isordil while inpt however  d/c'ed at discharge given low BP's   -BB: Toprol XL 25 mg po daily   -Aldosterone antagonist: Aldactone discontinued in setting of hyperkalemia   -SCD prophylaxis ICD  -increased home torsemide from 20 mg P daily to 40 AM and 20 PM   -20 mEq of potassium chloride qday    -close f/u with Dr. Watkins  -repeat BMP in one week with [facility] clinic f/u   -cardiac rehab   -reiterated fluid/Na restriction and daily weights     # Depressed mood  In the setting of progressive HFrEF, no SI or HI.  -offered medication mgmt vs therapy/psychiatry or health psychology consult and pt declined   -mood improved HD#2 with HF mgmt      ===Chronic Medical Problems===  # Sleep Apnea - CPAP  # HTN -medications as above  # HLD-continue PTA statin    # DMII (Hgb A1C 6.8 6/11/18) -hold PTA metformin   # CAD ppx-continue PTA statin            Physical Exam on day of Discharge:   Patient Vitals for the past 12 hrs:   BP Temp Temp src Heart Rate Resp SpO2 Weight   06/15/18 0813 (!) (P) 84/61 (P) 97.5  F (36.4  C) (P) Oral (P) 93 (P) 18 (P) 96 % -   06/15/18 0400 95/82 98.8  F (37.1  C) Oral 86 18 94 % 91.4 kg (201 lb 9.6 oz)   06/14/18 2356 102/87 97.6  F (36.4  C) Oral 81 18 94 % -       GEN:  Alert, oriented x 3, appears comfortable, NAD.  CV:  Regular rate and rhythm, no murmur flat JVD.    LUNGS:  Clear to auscultation bilaterally   ABD:  Active bowel sounds, soft, non-tender/non-distended.  No rebound/guarding/rigidity.  EXT:  No edema or cyanosis.  .           Pending Results at the time of Discharge:     Unresulted Labs Ordered in the Past 30 Days of this Admission     No orders found from 4/12/2018 to 6/12/2018.               Discharge Medications:     Current Discharge Medication List      START taking these medications    Details   potassium chloride SA (K-DUR/KLOR-CON M) 20 MEQ CR tablet Take 1 tablet (20 mEq) by mouth daily  Qty: 90 tablet, Refills: 3    Associated Diagnoses: Acute systolic congestive heart failure (H)          CONTINUE these medications which have CHANGED    Details   !! torsemide (DEMADEX) 20 MG tablet Take 1 tablet (20 mg) by mouth every evening  Qty: 30 tablet, Refills: 3    Associated Diagnoses: Acute systolic congestive heart failure (H)      !! torsemide (DEMADEX) 20 MG tablet Take 2 tablets (40 mg) by mouth every morning  Qty: 30 tablet, Refills: 3    Associated Diagnoses: Acute systolic congestive heart failure (H)      !! torsemide (DEMADEX) 20 MG tablet Take 2 tablets (40 mg) by mouth every morning  Qty: 90 tablet, Refills: 3    Associated Diagnoses: Acute systolic congestive heart failure (H)      !! torsemide (DEMADEX) 20 MG tablet Take 1 tablet (20 mg) by mouth every evening  Qty: 90 tablet, Refills: 3    Associated Diagnoses: Acute systolic congestive heart failure (H)       !! - Potential duplicate medications found. Please discuss with provider.      CONTINUE these medications which have NOT CHANGED    Details   aspirin EC 81 MG EC tablet Take 1 tablet (81 mg) by mouth daily  Qty: 30 tablet, Refills: 3    Associated Diagnoses: Acute systolic congestive heart failure (H)      atorvastatin (LIPITOR) 10 MG tablet TAKE 1 TABLET BY MOUTH EVERY DAY  Qty: 90 tablet, Refills: 3    Associated Diagnoses: Acute systolic congestive heart failure (H)      digoxin (LANOXIN) 250 MCG tablet TAKE 1 TABLET (250 MCG) BY MOUTH DAILY  Qty: 90 tablet, Refills: 3    Associated Diagnoses: Acute on chronic systolic heart failure (H)      metFORMIN (GLUCOPHAGE) 500 MG tablet Take 1 tablet (500 mg) by mouth 2 times daily (with meals)    Associated Diagnoses: Chronic systolic heart failure (H); Acute systolic congestive heart failure (H)      metoprolol succinate (TOPROL-XL) 25 MG 24 hr tablet Take 1 tablet (25 mg) by mouth daily  Qty: 90 tablet, Refills: 3    Associated Diagnoses: Acute on chronic systolic heart failure (H)      sacubitril-valsartan (ENTRESTO) 24-26 MG per tablet Take 1 tablet by mouth 2 times daily      traZODone  (DESYREL) 100 MG tablet Take 1 tablet (100 mg) by mouth nightly as needed for sleep  Qty: 90 tablet, Refills: 1    Associated Diagnoses: Insomnia, unspecified type      sildenafil (VIAGRA) 50 MG tablet Take 1 tablet (50 mg) by mouth daily as needed  Qty: 20 tablet, Refills: 1    Associated Diagnoses: Drug-induced impotence                  Discharge Instructions and Follow-Up:     Discharge Procedure Orders  CARDIAC REHAB REFERRAL   Referral Type: Rehab Therapy Cardiac Therapy     Reason for your hospital stay   Order Comments: You were hospitalized for heart failure     Follow Up and recommended labs and tests   Order Comments: Follow up with your cardiologist in one week with BMP.     Activity   Order Comments: Your activity upon discharge: activity as tolerated   Order Specific Question Answer Comments   Is discharge order? Yes      Monitor and record   Order Comments: Daily weights     When to contact your care team   Order Comments: If chest pain or worsening shortness of breath and/or lower extremity swelling and/or weight gain that is not controlled by water pills, please call your cardiologist or seek medical help     Full Code     Diet   Order Comments: Follow this diet upon discharge: Orders Placed This Encounter     Fluid restriction 2000 ML FLUID     Low Saturated Fat Na <2400 mg   Order Specific Question Answer Comments   Is discharge order? Yes                Discharge Disposition:   Discharged to home           Condition on Discharge:   Discharge condition: Stable   Code status on discharge: Full Code        This patient was seen and plan was discussed with Dr. Lincoln.     Alex Chung MD  Internal Medicine Resident, PGY-2  [phone]    I personally provided care for this patient, reviewed chart, discussed course with patient, housestaff and consulting physicians.  I answered all questions.    Enoc Lincoln M.D.  Division of Cardiology  Department of Medicine

## 2018-06-15 NOTE — PROGRESS NOTES
-Pt received orders that were carried out to discharge to home.  -Pt and his sister reviewed and understood his discharge orders, instructions, follow-up appointments and prescriptions.  -Pt up ad-moshe with safe judgement.

## 2018-06-15 NOTE — PLAN OF CARE
Problem: Patient Care Overview  Goal: Plan of Care/Patient Progress Review  Outcome: Improving  D/A/I:  Patient A&O x4, denied pain, palpitations, difficulty breathing, SOB, dizziness, and nausea.  Lung sounds clear to auscultation, no abnormal heart sounds noted.  In sinus rhythm with BBB and rare PVCs, HR 80s, SBP 80s-100s, SaO2 95-97% on room air.  Furosemide gtt discontinued and patient transitioned to po torsemide.  Patient had adequate urine output, see I&O flowsheet.  Potassium replaced per protocol.  Ambulated frequently in hallway independently with steady gait.  Sister visited during shift.    P:  Continue to monitor pain, VS, heart rhythm, fluid status, cardiac and respiratory status.  Notify care team of changes in patient condition or other concerns.  Continue diuresis.  Potential discharge tomorrow pending euvolemia.

## 2018-06-15 NOTE — PLAN OF CARE
Problem: Patient Care Overview  Goal: Plan of Care/Patient Progress Review  Outcome: Improving    D: admitted 6/11 s/p RHC, hx HF with EF 30%, NICM s/p ICD 4/2017, TR, DM2, HTN, central sleep apnea      I/A:   Neuro- A&Ox4, ind  CV- SR 70-90s, BPs 90-100s/60-80s  Pulm- sating well on RA, denies SOB or CP, declined C-PAP  GI/-cardiac diet, 2 L FR. BM 6/14, voiding well  LDA- L PIV SL  Skin- no concerns  Pain- denies      P:  Possible D/C today pending volume status. Continue to monitor and notify CARDS 2 with any changes or concerns.

## 2018-06-19 ENCOUNTER — TELEPHONE (OUTPATIENT)
Dept: CARDIOLOGY | Facility: CLINIC | Age: 63
End: 2018-06-19

## 2018-06-19 NOTE — TELEPHONE ENCOUNTER
[facility] Call Center    Phone Message    May a detailed message be left on voicemail: yes    Reason for Call: Other: Drug interaction warning between Entresto and Lisinopril. Could possibly cause angio edema. Please call pharmacy back.     Action Taken: Message routed to:  [facility] ([facility]): Cardiology

## 2018-06-20 DIAGNOSIS — I50.21 ACUTE SYSTOLIC CONGESTIVE HEART FAILURE (H): ICD-10-CM

## 2018-06-20 RX ORDER — ATORVASTATIN CALCIUM 10 MG/1
10 TABLET, FILM COATED ORAL DAILY
Qty: 90 TABLET | Refills: 3 | Status: ON HOLD | OUTPATIENT
Start: 2018-06-20 | End: 2018-12-17

## 2018-06-20 NOTE — TELEPHONE ENCOUNTER
Called pharmacy and left message that patient stopped Lisinopril and started Entresto a few weeks ago. Please remove Entresto from medication list.   Cecelia Palacios RN

## 2018-06-22 ENCOUNTER — TELEPHONE (OUTPATIENT)
Dept: CARDIOLOGY | Facility: CLINIC | Age: 63
End: 2018-06-22

## 2018-06-22 ENCOUNTER — PRE VISIT (OUTPATIENT)
Dept: CARDIOLOGY | Facility: CLINIC | Age: 63
End: 2018-06-22

## 2018-06-22 DIAGNOSIS — I50.21 ACUTE SYSTOLIC CONGESTIVE HEART FAILURE (H): ICD-10-CM

## 2018-06-22 DIAGNOSIS — I50.20 HFREF (HEART FAILURE WITH REDUCED EJECTION FRACTION) (H): Primary | ICD-10-CM

## 2018-06-22 RX ORDER — TORSEMIDE 20 MG/1
TABLET ORAL
Qty: 90 TABLET | Refills: 3 | Status: SHIPPED | OUTPATIENT
Start: 2018-06-22 | End: 2018-06-25

## 2018-06-22 NOTE — TELEPHONE ENCOUNTER
[facility] Call Center    Phone Message    May a detailed message be left on voicemail: yes    Reason for Call: Other: Per call from PT he gained 4 1/2 pounds the last few days.  Per PT he was told to call when this is happening and not urinate      Action Taken: Message routed to:  [facility] Cardiology

## 2018-06-22 NOTE — TELEPHONE ENCOUNTER
Attempted numerous times to call patient back, but no answer and mailbox is full - unable to leave message.  If patient calls back, we need to know what his weight is and what he is taking for diuretic.     Sparkle Joel RN

## 2018-06-22 NOTE — TELEPHONE ENCOUNTER
"I called patient again and was able to get ahold of him this time.  States his weight is up 4.5lbs over the last several days - weight today is 206.5lbs. He confirms he's taking torsemide 40/20.  States he's struggling to sleep \"a little\" because of a heavy chest, fluid feeling.  States he feels his abd is \"a little puffy'. Discussed with Kylie Faye NP then confirmed med increase with Fabiano verbalizes understanding and agrees with plan of care. Sparkle Joel RN      Date: 6/22/2018  Time of Call: 3:02 PM  Diagnosis:  Heart failure  [facility] - Ordering provider: Aster Purcell NP  Order: Increase torsemide to 40 BID thru the weekend. Increase dietary potassium.  Keep [facility] appt w/labs with Aster on Monday 6/25.   Order received by: Sparkle Joel RN   Follow-up/additional notes:         "

## 2018-06-25 ENCOUNTER — OFFICE VISIT (OUTPATIENT)
Dept: CARDIOLOGY | Facility: CLINIC | Age: 63
End: 2018-06-25
Attending: NURSE PRACTITIONER
Payer: COMMERCIAL

## 2018-06-25 VITALS
HEIGHT: 69 IN | BODY MASS INDEX: 31.55 KG/M2 | SYSTOLIC BLOOD PRESSURE: 92 MMHG | OXYGEN SATURATION: 94 % | WEIGHT: 213 LBS | DIASTOLIC BLOOD PRESSURE: 60 MMHG | HEART RATE: 66 BPM

## 2018-06-25 DIAGNOSIS — I50.21 ACUTE SYSTOLIC CONGESTIVE HEART FAILURE (H): ICD-10-CM

## 2018-06-25 DIAGNOSIS — I50.20 HFREF (HEART FAILURE WITH REDUCED EJECTION FRACTION) (H): ICD-10-CM

## 2018-06-25 DIAGNOSIS — I49.9 IRREGULAR HEART BEAT: ICD-10-CM

## 2018-06-25 DIAGNOSIS — I50.22 CHRONIC SYSTOLIC HEART FAILURE (H): Primary | ICD-10-CM

## 2018-06-25 DIAGNOSIS — I50.23 ACUTE ON CHRONIC SYSTOLIC HEART FAILURE (H): ICD-10-CM

## 2018-06-25 LAB
ANION GAP SERPL CALCULATED.3IONS-SCNC: 10 MMOL/L (ref 3–14)
BUN SERPL-MCNC: 28 MG/DL (ref 7–30)
CALCIUM SERPL-MCNC: 9.3 MG/DL (ref 8.5–10.1)
CHLORIDE SERPL-SCNC: 96 MMOL/L (ref 94–109)
CO2 SERPL-SCNC: 30 MMOL/L (ref 20–32)
CREAT SERPL-MCNC: 1.41 MG/DL (ref 0.66–1.25)
GFR SERPL CREATININE-BSD FRML MDRD: 51 ML/MIN/1.7M2
GLUCOSE SERPL-MCNC: 147 MG/DL (ref 70–99)
POTASSIUM SERPL-SCNC: 4.4 MMOL/L (ref 3.4–5.3)
SODIUM SERPL-SCNC: 135 MMOL/L (ref 133–144)

## 2018-06-25 PROCEDURE — 80048 BASIC METABOLIC PNL TOTAL CA: CPT | Performed by: NURSE PRACTITIONER

## 2018-06-25 PROCEDURE — G0463 HOSPITAL OUTPT CLINIC VISIT: HCPCS | Mod: ZF

## 2018-06-25 PROCEDURE — 93010 ELECTROCARDIOGRAM REPORT: CPT | Mod: ZP | Performed by: INTERNAL MEDICINE

## 2018-06-25 PROCEDURE — 36415 COLL VENOUS BLD VENIPUNCTURE: CPT | Performed by: NURSE PRACTITIONER

## 2018-06-25 PROCEDURE — 40000141 ZZH STATISTIC PERIPHERAL IV START W/O US GUIDANCE

## 2018-06-25 PROCEDURE — 99214 OFFICE O/P EST MOD 30 MIN: CPT | Mod: ZP | Performed by: NURSE PRACTITIONER

## 2018-06-25 PROCEDURE — 25000132 ZZH RX MED GY IP 250 OP 250 PS 637: Mod: ZF | Performed by: NURSE PRACTITIONER

## 2018-06-25 PROCEDURE — 25000128 H RX IP 250 OP 636: Mod: ZF | Performed by: NURSE PRACTITIONER

## 2018-06-25 RX ORDER — FUROSEMIDE 10 MG/ML
100 INJECTION INTRAMUSCULAR; INTRAVENOUS ONCE
Status: COMPLETED | OUTPATIENT
Start: 2018-06-25 | End: 2018-06-25

## 2018-06-25 RX ORDER — TORSEMIDE 20 MG/1
60 TABLET ORAL 2 TIMES DAILY
Qty: 90 TABLET | Refills: 3 | Status: SHIPPED | OUTPATIENT
Start: 2018-06-25 | End: 2018-06-29

## 2018-06-25 RX ORDER — POTASSIUM CHLORIDE 1500 MG/1
40 TABLET, EXTENDED RELEASE ORAL ONCE
Status: COMPLETED | OUTPATIENT
Start: 2018-06-25 | End: 2018-06-25

## 2018-06-25 RX ORDER — METOPROLOL SUCCINATE 25 MG/1
12.5 TABLET, EXTENDED RELEASE ORAL DAILY
Qty: 90 TABLET | Refills: 3 | Status: SHIPPED | OUTPATIENT
Start: 2018-06-25 | End: 2018-10-16

## 2018-06-25 RX ADMIN — FUROSEMIDE 100 MG: 10 INJECTION, SOLUTION INTRAVENOUS at 12:50

## 2018-06-25 RX ADMIN — POTASSIUM CHLORIDE 40 MEQ: 1500 TABLET, EXTENDED RELEASE ORAL at 12:50

## 2018-06-25 ASSESSMENT — PAIN SCALES - GENERAL: PAINLEVEL: NO PAIN (0)

## 2018-06-25 NOTE — NURSING NOTE
Diet: Patient instructed regarding a heart healthy diet, including discussion of reduced fat and sodium intake. Patient demonstrated understanding of this information and agreed to call with further questions or concerns.  Labs: Patient was given results of the laboratory testing obtained today.  Patient demonstrated understanding of this information and agreed to call with further questions or concerns.   Return Appointment: Patient given instructions regarding scheduling next clinic visit. Patient demonstrated understanding of this information and agreed to call with further questions or concerns.  Medication Change: Patient was educated regarding prescribed medication change, including discussion of the indication, administration, side effects, and when to report to MD or RN. Patient demonstrated understanding of this information and agreed to call with further questions or concerns.  Given 100 mg IV Lasix and 40 mEq Potassium in clinic and patient tolerated well.   Delay in getting labs back, called patient after he left to review labs. Gave order via Aster to take Torsemide 60 mg BID and Potassium 20 daily.   I will call him tomorrow to reassess symptoms.  Patient stated he understood all health information given and agreed to call with further questions or concerns.   Cecelia Palacios RN

## 2018-06-25 NOTE — PATIENT INSTRUCTIONS
"You were seen today in the Cardiovascular Clinic at the [facility].     Cardiology Providers you saw during your visit: Aster NARANJO CNP     Follow up and medication changes:    1. Given 100 mg IV Lasix and 40 mEq Potassium in clinic today.   2. Decrease Metoprolol XL to 12.5 mg (half tab) daily.   3. Follow up next  with Deysi Mattson NP  4. We will call you with diuretic recommendations when your lab results come back.          Please limit your fluid intake to 2 L (68 ounces) daily.  2 Liters a day = 8.5 cups, or 68 ounces.  Please limit your salt intake to 2 grams a day or less.     If you gain 2# in 24 hours or 5# in one week call Cecelia Palacios RN so we can adjust your medications as needed over the phone.     Please feel free to call me with any questions or concerns.       Cecelia Palacios RN  [facility]  Cardiology Care Coordinator-Heart Failure Clinic     Questions and [phone].   First press #1 for the [employer] and then press #3 for \"Medical Questions\" to reach us Cardiology Nurses.      On Call Cardiologist for after hours or on weekends: [phone]   option #4 and ask to speak to the on-call Cardiologist. Inform them you are a C[facility]/heart failure patient at the [place].           If you need a medication refill please contact your pharmacy.  Please allow 3 business days for your refill to be completed.  _______________________________________________________  [facility] Cardiomyopathy, Optimization, Rehabilitation, Education   The [facility] CLIN[facility] is a heart failure specialty clinic within the [facility] where you will work with specialized nurse practitioners dedicated to helping patients with heart failure carefully adjust medications, receive education, and learn who and when to call if symptoms develop. They specialize in helping you better understand your condition, slow the " progression of your disease, improve the length and quality of your life, help you detect future heart problems before they become life threatening, and avoid hospitalizations.  As always, thank you for trusting us with your health care needs!

## 2018-06-25 NOTE — LETTER
6/25/2018    RE: Danielito Chu  [address] [place] [address]     Dear Colleague,    Thank you for the opportunity to participate in the care of your patient, Danielito Chu, at the [facility] at [facility]. Please see a copy of my visit note below.    HPI:   Mr. Chu is a 63 year-old man with a history of nonischemic cardiomyopathy with an ejection fraction at its dong of 15% who was admitted in 2017 to the [facility] for decompensated heart failure. Due to refractory volume overload and difficulty diuresing, he was transferred for consideration of advanced therapies. He was diuresed over 40 pounds and was placed on medical therapy which he tolerated for some time. He did well last summer and was back to work full-time-however in the last few months, he has had increased symptoms - diuretics were adjusted as an outpatient but he was ultimately admitted 6/11 in the setting of missed medications and dietary indiscretion for evaluation and IV diuretics. His RHC showed RA 20, PA 45/25(32), and PCWP 25, PA sat 48%, and CO/CI by Td 3/1.4  CPX done on day of admission (with elevated filling pressures) showed peak VO2 9.3 ml/kg/min 31% predicted, RER 1.03, VE/VOC2 slope 41, frequent PVCs noted during rest - less frequent with exercise. This was significantly worse compared to CPX last year. He was diuresed ~20 lb and was d/c'ed on torsemide 40/20 mg of torsemide (from 20 mg bid PTA). Entresto was cut in half, hydral and ISDN attempted while inpatient but not continued due to hypotension. He was d/c'ed 6/15-  weight at dc was ~200 lb. He called triage line 6/22 with c/o weight gain and torsemide was increased to 40 bid but patient actually increased from 20 mg bid to 40/20 mg as he did not understand DC instructions. He then presented to local ED yesterday as sx did not improve and was given IV lasix 40 mg x2.     Since hospital discharge and ED  visit, he reports no change/relief in sx. His weight is up 9 lb on home scale since DC. He endorses BAJWA at 1 block, chest pressure when laying flat consistent with orthopnea. Does not have significant LE edema. He notes recently taking a lot of Azalea [place] which he just found out has a lot of sodium. No palpitations, chest pain with exertion, ICD shocks, syncope.     PAST MEDICAL HISTORY:  Past Medical History:   Diagnosis Date     Acute systolic congestive heart failure (H) 2017     Diabetes (H)      HTN (hypertension)      Hyperlipidemia      Mitral regurgitation      Nocturnal oxygen desaturation 3/29/2018     Nonischemic cardiomyopathy (H) 2017     SHIRLEY (obstructive sleep apnea)      Pacemaker 2017    ICD 17       FAMILY HISTORY:  Family History   Problem Relation Age of Onset     Heart Failure Father       at age 86     Heart Failure Paternal Uncle       at 66      Myocardial Infarction Paternal Uncle       at age 62     Coronary Artery Disease Mother       during CABG at age 41       SOCIAL HISTORY:  Social History     Social History     Marital status: Single     Spouse name: N/A     Number of children: N/A     Years of education: N/A     Social History Main Topics     Smoking status: Former Smoker     Smokeless tobacco: Never Used     Alcohol use Not on file     Drug use: Not on file     Sexual activity: Not on file     Other Topics Concern     Not on file     Social History Narrative       CURRENT MEDICATIONS:    Current Outpatient Prescriptions on File Prior to Visit:  aspirin EC 81 MG EC tablet Take 1 tablet (81 mg) by mouth daily   atorvastatin (LIPITOR) 10 MG tablet Take 1 tablet (10 mg) by mouth daily   digoxin (LANOXIN) 250 MCG tablet TAKE 1 TABLET (250 MCG) BY MOUTH DAILY   metFORMIN (GLUCOPHAGE) 500 MG tablet Take 1 tablet (500 mg) by mouth 2 times daily (with meals)   metoprolol succinate (TOPROL-XL) 25 MG 24 hr tablet Take 1 tablet (25 mg) by mouth daily  "  potassium chloride SA (K-DUR/KLOR-CON M) 20 MEQ CR tablet Take 1 tablet (20 mEq) by mouth daily   sacubitril-valsartan (ENTRESTO) 24-26 MG per tablet Take 1 tablet by mouth 2 times daily   torsemide (DEMADEX) 20 MG tablet Take torsemide 40mg (2 tabs) in the AM and 20mg (1 tab) in the PM.   traZODone (DESYREL) 100 MG tablet Take 1 tablet (100 mg) by mouth nightly as needed for sleep   sildenafil (VIAGRA) 50 MG tablet Take 1 tablet (50 mg) by mouth daily as needed (Patient not taking: Reported on 4/4/2018)     No current facility-administered medications on file prior to visit.     ROS:   CONSTITUTIONAL: Denies fever, chills, or weight fluctuations.   HEENT: Denies headache, vision changes, and changes in speech.   CV: Refer to HPI.   PULMONARY:Refer to HPI.   GI:Denies nausea, vomiting, diarrhea, and abdominal pain. Bowel movements are regular.   :Denies urinary alterations, dysuria, urinary frequency, hematuria, and abnormal drainage.   EXT:Denies lower extremity edema.   SKIN:Denies abnormal rashes or lesions.   MUSCULOSKELETAL:Denies upper or lower extremity weakness and pain.   NEUROLOGIC:Denies lightheadedness, dizziness, seizures, or upper or lower extremity paresthesia.     EXAM:  BP 92/60 (BP Location: Left arm, Patient Position: Chair, Cuff Size: Adult Large)  Pulse 66  Ht 1.753 m (5' 9\")  Wt 96.6 kg (213 lb)  SpO2 94%  BMI 31.45 kg/m2     GENERAL: Appears comfortable, in no acute distress.   HEENT: Eye symmetrical, no discharge or icterus bilaterally. Mucous membranes moist and without lesions.  CV: Irregularly irregular, +S1S2, no murmur, rub, or gallop. JVP mid neck at 60 degrees.   RESPIRATORY: Respirations regular, even, and unlabored. Lungs CTA throughout.   GI: Soft and non distended with normoactive bowel sounds present in all quadrants. No tenderness, rebound, guarding. No hepatomegaly.   EXTREMITIES: Trace peripheral edema. 2+ bilateral pedal pulses.   NEUROLOGIC: Alert and oriented x 3. " No focal deficits.   MUSCULOSKELETAL: No joint swelling or tenderness.   SKIN: No jaundice. No rashes or lesions.     Labs, reviewed with patient in clinic today:  CBC RESULTS:  Lab Results   Component Value Date    WBC 7.9 06/15/2018    RBC 5.84 06/15/2018    HGB 17.3 06/15/2018    HCT 53.1 (H) 06/15/2018    MCV 91 06/15/2018    MCH 29.6 06/15/2018    MCHC 32.6 06/15/2018    RDW 18.4 (H) 06/15/2018     06/15/2018       CMP RESULTS:  Lab Results   Component Value Date     06/25/2018    POTASSIUM 4.4 06/25/2018    CHLORIDE 96 06/25/2018    CO2 30 06/25/2018    ANIONGAP 10 06/25/2018     (H) 06/25/2018    BUN 28 06/25/2018    CR 1.41 (H) 06/25/2018    GFRESTIMATED 51 (L) 06/25/2018    GFRESTBLACK 62 06/25/2018    ASHLEE 9.3 06/25/2018    BILITOTAL 3.0 (H) 06/13/2018    ALBUMIN 3.7 06/13/2018    ALKPHOS 95 06/13/2018    ALT 16 06/13/2018    AST 25 06/13/2018        INR RESULTS:  Lab Results   Component Value Date    INR 1.23 (H) 06/15/2018       Lab Results   Component Value Date    MAG 2.5 (H) 06/15/2018     Lab Results   Component Value Date    NTBNPI 3382 (H) 06/11/2018     Lab Results   Component Value Date    NTBNP 1671 (H) 04/04/2018       Diagnostics:  TTE 6/15/17  The LVEF is visually estimated in the 30 % range (calculated, 28 %.) Severe  diffuse hypokinesis with inferior wall akinesis is present.  Mild to moderate right ventricular dilation is present. Global right  ventricular function is normal.  Mild aortic insufficiency is present.  Pulmonary artery systolic pressure is normal.  Dilation of the inferior vena cava is present with normal respiratory  variation in diameter. Estimated mean right atrial pressure is 8 mmHg.  No pericardial effusion is present.  Previous study not available for comparison.    RHC 6/11/18      CPX 6/11/18    Cor angio 3/29/17      ICD interrogation 5/29/18:    Scheduled [employer] single chamber ICD remote transmission received and reviewed. Device  transmission sent per MD orders. His presenting rhythm is an irregular ventricular rhythm ~90 bpm. 10 NSVT episodes recorded lasting up to 5 seconds (log book states 12-16 seconds). Normal device function. = 0%. Lead trends appear stable. Battery estimates 12 years to LINCOLN. Pt notified of transmission results. Plan for pt to RTC in 2 months as scheduled.  Remote ICD transmission    Assessment and Plan:   Mr. Chu is a 63 year old male with history of nonischemic cardiomyopathy who was transferred from [place] as an inpatient in 2017 for consideration of advanced therapies. He did well with medical therapy for almost a year but lately we have been struggling with fluid retention despite escalation of torsemide. His recent CPX showed significant limitation (peak VO2 9.3, on BB) but this was in the setting of significant hypervolemia. His last RHC showed elevated filling pressures (RA 20, PCWP 25) and low cardiac output (3 by Td, CI 1.4). He was diuresed 25 lb since cath but presents fluid up today with CRUZ, we will give IV lasix and increase torsemide and see in clinic next week. We will also decrease BB again given low outputs and borderline BP. He is seeing Dr Watkins soon, anticipate starting evaluation for LVAD soon given his advanced disease.     # Acute on chronic systolic heart failure/HFrEF secondary to NICM    Stage C. NYHA Class IIIB.    -Fluid status: hypervolemic, given 100 mg IV lasix today and 40 meq KCl, increase torsemide to 60 mg bid, will have him take metolazone tomorrow if weight still up  -ACEi/ARB/ARNI: Entresto 24/26 mg bid  -BB: decrease Toprol XL 12.5 mg daily given current decompensation/low output   -Aldosterone antagonist: d/c'ed due to hyperK  -Other: digoxin 250 mcg daily, will check level with next labs  -SCD prophylaxis: ICD  -NSAID use: contraindicated  -Sleep apnea: has CSA, not using device - APV contraindicated, recommend following up with sleep medicine   -Remote monitoring:  referral to [facility] started today    # DM  Treated with metformin, he is on asa and statin for primary prevention     Follow up next week in CORE     25 minutes spent face-to-face with patient, >50% in counseling and/or coordination of care as described above    Aster Purcell, TAMANNA, NP-C  6/25/2018    DRISS LINN

## 2018-06-25 NOTE — NURSING NOTE
Chief Complaint   Patient presents with     Follow Up For     Return CORE: 64 yo male with HFrEF. Presenting for f/u with labs prior.     Vitals were taken and medications were reconciled.    JOSE M Rodriguez  11:37 AM

## 2018-06-25 NOTE — MR AVS SNAPSHOT
"              After Visit Summary   2018    Danielito Chu    MRN: [id number]           Patient Information     Date Of Birth          1955        Visit Information        Provider Department      2018 12:00 PM Kalli Purcell APRN CNP [facility]        Today's Diagnoses     Chronic systolic heart failure (H)    -  1    Irregular heart beat        Acute on chronic systolic heart failure (H)          Care Instructions    You were seen today in the Cardiovascular Clinic at the [facility].     Cardiology Providers you saw during your visit: Aster NARANJO CNP     Follow up and medication changes:    1. Given 100 mg IV Lasix and 40 mEq Potassium in clinic today.   2. Decrease Metoprolol XL to 12.5 mg (half tab) daily.   3. Follow up next  with Deysi Mattson NP  4. We will call you with diuretic recommendations when your lab results come back.          Please limit your fluid intake to 2 L (68 ounces) daily.  2 Liters a day = 8.5 cups, or 68 ounces.  Please limit your salt intake to 2 grams a day or less.     If you gain 2# in 24 hours or 5# in one week call Cecelia Palacios RN so we can adjust your medications as needed over the phone.     Please feel free to call me with any questions or concerns.       Cecelia Palacios RN  [facility]  Cardiology Care Coordinator-Heart Failure Clinic     Questions and [phone].   First press #1 for the University and then press #3 for \"Medical Questions\" to reach us Cardiology Nurses.      On Call Cardiologist for after hours or on weekends: [phone]   option #4 and ask to speak to the on-call Cardiologist. Inform them you are a CORE/heart failure patient at the [facility].           If you need a medication refill please contact your pharmacy.  Please allow 3 business days for your refill to be completed.  _______________________________________________________  C.O.R.E. CLINIC " Cardiomyopathy, Optimization, Rehabilitation, Education   The C.O.R.E. CLINIC is a heart failure specialty clinic within the [facility] where you will work with specialized nurse practitioners dedicated to helping patients with heart failure carefully adjust medications, receive education, and learn who and when to call if symptoms develop. They specialize in helping you better understand your condition, slow the progression of your disease, improve the length and quality of your life, help you detect future heart problems before they become life threatening, and avoid hospitalizations.  As always, thank you for trusting us with your health care needs!             Follow-ups after your visit        Additional Services     NUTRITION REFERRAL           Follow-Up with CORE Clinic       With meagan next week                  Your next 10 appointments already scheduled     Jul 03, 2018  1:30 PM CDT   Lab with  LAB   [facility] ([facility])    [address] [place] [id number]   [phone]            Jul 03, 2018  2:00 PM CDT   (Arrive by 1:45 PM)   CORE RETURN with JOSE A [facility])    [address] [place] [address]  [address] [place] [address]   [phone]            Jul 19, 2018  2:00 PM CDT   Lab with Danal d/b/a [employer] LAB   [facility] ([facility])    [address] [place] [address]   [phone]            Jul 19, 2018  2:30 PM CDT   (Arrive by 2:15 PM)   Implanted Defibulator with  Cv Device 1   [facility] ([facility])    [address] [place] [address]  [address] [place] [address]   [phone]            Jul 19, 2018  3:00 PM CDT   (Arrive by 2:45 PM)   RETURN HEART FAILURE with Lorena Watkins MD   [facility] ([facility])    [address] [place] [address]  "Street   [address]  [facility] [id number] [phone]              Future tests that were ordered for you today     Open Future Orders        Priority Expected Expires Ordered    Follow-Up with CORE Clinic Routine 7/3/2018 9/30/2018 6/25/2018            Who to contact     If you have questions or need follow up information about today's clinic visit or your schedule please contact [facility] directly at [phone].  Normal or non-critical lab and imaging results will be communicated to you by MyChart, letter or phone within 4 business days after the clinic has received the results. If you do not hear from us within 7 days, please contact the clinic through isockethart or phone. If you have a critical or abnormal lab result, we will notify you by phone as soon as possible.  Submit refill requests through [employer] or call your pharmacy and they will forward the refill request to us. Please allow 3 business days for your refill to be completed.          Additional Information About Your Visit        Care EveryWhere ID     This is your Care EveryWhere ID. This could be used by other organizations to access your [place] medical records  [id number]        Your Vitals Were     Pulse Height Pulse Oximetry BMI (Body Mass Index)          66 1.753 m (5' 9\") 94% 31.45 kg/m2         Blood Pressure from Last 3 Encounters:   06/25/18 92/60   06/15/18 92/61   05/11/18 95/58    Weight from Last 3 Encounters:   06/25/18 96.6 kg (213 lb)   06/15/18 91.4 kg (201 lb 9.6 oz)   05/11/18 98.7 kg (217 lb 8 oz)              We Performed the Following     EKG 12-lead, tracing only (Same Day)     NUTRITION REFERRAL          Today's Medication Changes          These changes are accurate as of 6/25/18  1:07 PM.  If you have any questions, ask your nurse or doctor.               These medicines have changed or have updated prescriptions.        Dose/Directions    metoprolol succinate 25 MG 24 hr tablet   Commonly known as:  " TOPROL-XL   This may have changed:  how much to take   Used for:  Acute on chronic systolic heart failure (H)   Changed by:  Kalli Purcell APRN CNP        Dose:  12.5 mg   Take 0.5 tablets (12.5 mg) by mouth daily   Quantity:  90 tablet   Refills:  3            Where to get your medicines      These medications were sent to [facility]/pharmacy #[id number] - [facility] [address] [address]     Phone:  [phone]     metoprolol succinate 25 MG 24 hr tablet                Primary Care Provider Office Phone # Fax #    Bashir Hines [phone]       [facility] [address] [facility] [address]        Equal Access to Services     ALEX RODGERS : Hadii andres fuenteso Sotabatha, waaxda luqadaha, qaybta kaalmada adeegyada, waxrey pearson. So [facility] [phone].    ATENCIÓN: Si habla español, tiene a stokes disposición servicios gratuitos de asistencia lingüística. [facility] [phone].    We comply with applicable federal civil rights laws and [place] laws. We do not discriminate on the basis of race, color, national origin, age, disability, sex, sexual orientation, or gender identity.            Thank you!     Thank you for choosing [facility]  for your care. Our goal is always to provide you with excellent care. Hearing back from our patients is one way we can continue to improve our services. Please take a few minutes to complete the written survey that you may receive in the mail after your visit with us. Thank you!             Your Updated Medication List - Protect others around you: Learn how to safely use, store and throw away your medicines at www.disposemymeds.org.          This list is accurate as of 6/25/18  1:07 PM.  Always use your most recent med list.                   Brand Name Dispense Instructions for use Diagnosis    aspirin 81 MG EC tablet     30 tablet    Take 1 tablet (81 mg) by mouth daily    Acute  systolic congestive heart failure (H)       atorvastatin 10 MG tablet    LIPITOR    90 tablet    Take 1 tablet (10 mg) by mouth daily    Acute systolic congestive heart failure (H)       digoxin 250 MCG tablet    LANOXIN    90 tablet    TAKE 1 TABLET (250 MCG) BY MOUTH DAILY    Acute on chronic systolic heart failure (H)       metFORMIN 500 MG tablet    GLUCOPHAGE     Take 1 tablet (500 mg) by mouth 2 times daily (with meals)    Chronic systolic heart failure (H), Acute systolic congestive heart failure (H)       metoprolol succinate 25 MG 24 hr tablet    TOPROL-XL    90 tablet    Take 0.5 tablets (12.5 mg) by mouth daily    Acute on chronic systolic heart failure (H)       potassium chloride SA 20 MEQ CR tablet    K-DUR/KLOR-CON M    90 tablet    Take 1 tablet (20 mEq) by mouth daily    Acute systolic congestive heart failure (H)       sacubitril-valsartan 24-26 MG per tablet    ENTRESTO     Take 1 tablet by mouth 2 times daily        sildenafil 50 MG tablet    VIAGRA    20 tablet    Take 1 tablet (50 mg) by mouth daily as needed    Drug-induced impotence       torsemide 20 MG tablet    DEMADEX    90 tablet    Take torsemide 40mg (2 tabs) in the AM and 20mg (1 tab) in the PM.    Acute systolic congestive heart failure (H)       traZODone 100 MG tablet    DESYREL    90 tablet    Take 1 tablet (100 mg) by mouth nightly as needed for sleep    Insomnia, unspecified type

## 2018-06-25 NOTE — PROGRESS NOTES
"HPI:   Mr. Chu is a 62-year-old man with a history of nonischemic cardiomyopathy with an ejection fraction at its dong of 15% who was admitted in 2017 to the [facility] for decompensated heart failure. Due to refractory volume overload and difficulty diuresing, he was transferred for consideration of advanced therapies. Fortunately, he was diuresed over 40 pounds and was placed on medical therapy which he has continued to tolerate. He did well last summer and was back to work full-time-however in the last month he put on 15-20 pounds of fluid, was reporting PND and orthopnea, he has extreme fatigue, and he has been short of breath walking short distances. diuretics were adjusted. He then went on a trip to [place] and had some dietary indiscretions - so diuretics were further adjusted and he lost 10 lb. He was last seen in clinic 3/29 by Dr. Watkins, at that time he was noted to be hypervolemic so torsemide was increased to 40 mg bid. He presents today for follow-up.     Since last visit, he reports a significant increase in urine output since increasing torsemide. In fact, he skipped his second dose yesterday as he felt he was \"peeing too much\".  His weight is down 7 lb and at its dong in our system. He reports new headaches and some lightheadedness with standing. Does not check BP at home. He continues to endorse shortness of breath with 1/2 block. Overall, admits breathing is getting worse. Denies exertional chest pain or lightheadedness. Continues to endorse orthopnea, no PND. Abdominal bloating is unchanged. He is currently working but has applied for disability.     ***    PAST MEDICAL HISTORY:  Past Medical History:   Diagnosis Date     Acute systolic congestive heart failure (H) 4/5/2017     Diabetes (H)      HTN (hypertension)      Hyperlipidemia      Mitral regurgitation      Nocturnal oxygen desaturation 3/29/2018     Nonischemic cardiomyopathy (H) 4/5/2017     SHIRLEY (obstructive sleep apnea) "      Pacemaker 2017    ICD 17       FAMILY HISTORY:  Family History   Problem Relation Age of Onset     Heart Failure Father       at age 86     Heart Failure Paternal Uncle       at 66      Myocardial Infarction Paternal Uncle       at age 62     Coronary Artery Disease Mother       during CABG at age 41       SOCIAL HISTORY:  Social History     Social History     Marital status: Single     Spouse name: N/A     Number of children: N/A     Years of education: N/A     Social History Main Topics     Smoking status: Former Smoker     Smokeless tobacco: Never Used     Alcohol use Not on file     Drug use: Not on file     Sexual activity: Not on file     Other Topics Concern     Not on file     Social History Narrative       CURRENT MEDICATIONS:    Current Outpatient Prescriptions on File Prior to Visit:  aspirin EC 81 MG EC tablet Take 1 tablet (81 mg) by mouth daily   atorvastatin (LIPITOR) 10 MG tablet Take 1 tablet (10 mg) by mouth daily   digoxin (LANOXIN) 250 MCG tablet TAKE 1 TABLET (250 MCG) BY MOUTH DAILY   metFORMIN (GLUCOPHAGE) 500 MG tablet Take 1 tablet (500 mg) by mouth 2 times daily (with meals)   metoprolol succinate (TOPROL-XL) 25 MG 24 hr tablet Take 1 tablet (25 mg) by mouth daily   potassium chloride SA (K-DUR/KLOR-CON M) 20 MEQ CR tablet Take 1 tablet (20 mEq) by mouth daily   sacubitril-valsartan (ENTRESTO) 24-26 MG per tablet Take 1 tablet by mouth 2 times daily   torsemide (DEMADEX) 20 MG tablet Take torsemide 40mg (2 tabs) in the AM and 20mg (1 tab) in the PM.   traZODone (DESYREL) 100 MG tablet Take 1 tablet (100 mg) by mouth nightly as needed for sleep   sildenafil (VIAGRA) 50 MG tablet Take 1 tablet (50 mg) by mouth daily as needed (Patient not taking: Reported on 2018)     No current facility-administered medications on file prior to visit.     ROS:   CONSTITUTIONAL: Denies fever, chills, or weight fluctuations.   HEENT: Denies headache, vision changes, and  "changes in speech.   CV: Refer to HPI.   PULMONARY:Refer to HPI.   GI:Denies nausea, vomiting, diarrhea, and abdominal pain. Bowel movements are regular.   :Denies urinary alterations, dysuria, urinary frequency, hematuria, and abnormal drainage.   EXT:Denies lower extremity edema.   SKIN:Denies abnormal rashes or lesions.   MUSCULOSKELETAL:Denies upper or lower extremity weakness and pain.   NEUROLOGIC:Denies lightheadedness, dizziness, seizures, or upper or lower extremity paresthesia.     EXAM:  BP 92/60 (BP Location: Left arm, Patient Position: Chair, Cuff Size: Adult Large)  Pulse 66  Ht 1.753 m (5' 9\")  Wt 96.6 kg (213 lb)  SpO2 94%  BMI 31.45 kg/m2     GENERAL: Appears comfortable, in no acute distress. ***  HEENT: Eye symmetrical, no discharge or icterus bilaterally. Mucous membranes moist and without lesions.  CV: RRR, +S1S2, no murmur, rub, or gallop. JVP not visible at 60 degrees.   RESPIRATORY: Respirations regular, even, and unlabored. Lungs CTA throughout.   GI: Soft and non distended with normoactive bowel sounds present in all quadrants. No tenderness, rebound, guarding. No hepatomegaly.   EXTREMITIES: No peripheral edema. 2+ bilateral pedal pulses.   NEUROLOGIC: Alert and oriented x 3. No focal deficits.   MUSCULOSKELETAL: No joint swelling or tenderness.   SKIN: No jaundice. No rashes or lesions.     Labs, reviewed with patient in clinic today:  CBC RESULTS:  Lab Results   Component Value Date    WBC 7.9 06/15/2018    RBC 5.84 06/15/2018    HGB 17.3 06/15/2018    HCT 53.1 (H) 06/15/2018    MCV 91 06/15/2018    MCH 29.6 06/15/2018    MCHC 32.6 06/15/2018    RDW 18.4 (H) 06/15/2018     06/15/2018       CMP RESULTS:  Lab Results   Component Value Date     (L) 06/15/2018    POTASSIUM 4.1 06/15/2018    CHLORIDE 98 06/15/2018    CO2 25 06/15/2018    ANIONGAP 8 06/15/2018     (H) 06/15/2018    BUN 25 06/15/2018    CR 1.00 06/15/2018    GFRESTIMATED 76 06/15/2018    GFRESTBLACK " >90 06/15/2018    ASHLEE 8.7 06/15/2018    BILITOTAL 3.0 (H) 06/13/2018    ALBUMIN 3.7 06/13/2018    ALKPHOS 95 06/13/2018    ALT 16 06/13/2018    AST 25 06/13/2018        INR RESULTS:  Lab Results   Component Value Date    INR 1.23 (H) 06/15/2018       Lab Results   Component Value Date    MAG 2.5 (H) 06/15/2018     Lab Results   Component Value Date    NTBNPI 3382 (H) 06/11/2018     Lab Results   Component Value Date    NTBNP 1671 (H) 04/04/2018       Diagnostics:  TTE 6/15/17  The LVEF is visually estimated in the 30 % range (calculated, 28 %.) Severe  diffuse hypokinesis with inferior wall akinesis is present.  Mild to moderate right ventricular dilation is present. Global right  ventricular function is normal.  Mild aortic insufficiency is present.  Pulmonary artery systolic pressure is normal.  Dilation of the inferior vena cava is present with normal respiratory  variation in diameter. Estimated mean right atrial pressure is 8 mmHg.  No pericardial effusion is present.  Previous study not available for comparison.    CPX 6/15/17      Cor angio 3/29/17      RHC  4/3/17      Last ICD interrogation: Normal ICD function.  3 NSVT episodes recorded - 175-190, 4-5 sec.  Intrinsic rhythm = VS @ 101 bpm.   = <1%.  Estimated battery longevity to LINCOLN = 12 years.     Assessment and Plan:   Mr. Chu is a 62 year old male with history of nonischemic cardiomyopathy ***    who was transferred from [place] as an inpatient in 2017 for consideration of advanced therapies. He did well with medical therapy for almost a year but was found to be decompensated in clinic last week so torsemide was increased (and BB decreased).Today he presents hypovolemic (exam confirmed by Dr. Lincoln), mildly hypotensive, NT pro BNP is half of value last week, and Cr up a little likely prerenal. We will decrease torsemide to prior dose. We will refer again to [facility].    # Chronic systolic heart failure/HFrEF secondary to NICM    Stage C. NYHA  Class IIIB.    Fluid status: hypovolemic, hold torsemide his PM and resume at 20 mg bid tomorrow   ACEi/ARB/ARNI: lisinopril 15 mg in AM and 20 mg in PM, consider switch to Entresto when renal function stable and BP improved  BB: Toprol XL 25 mg daily  Aldosterone antagonist: spironolactone 25 mg daily  Other: digoxin 25 mcg daily, hydralazine 50 mg tid   SCD prophylaxis: ICD  NSAID use: contraindicated  Sleep apnea: has CSA, not using device - APV contraindicated, recommend following up with sleep medicine   Remote monitoring: referral to [facility] started today    # DM  Treated with metformin, he is on asa and statin for primary prevention       Follow up next week in [facility]      25 minutes spent face-to-face with patient, >50% in counseling and/or coordination of care as described above        Aster Purcell, TAMANNA, NP-C  6/25/2018            DRISS LINN

## 2018-06-25 NOTE — PROGRESS NOTES
HPI:   Mr. Chu is a 63 year-old man with a history of nonischemic cardiomyopathy with an ejection fraction at its dong of 15% who was admitted in 2017 to the [facility] for decompensated heart failure. Due to refractory volume overload and difficulty diuresing, he was transferred for consideration of advanced therapies. He was diuresed over 40 pounds and was placed on medical therapy which he tolerated for some time. He did well last summer and was back to work full-time-however in the last few months, he has had increased symptoms - diuretics were adjusted as an outpatient but he was ultimately admitted 6/11 in the setting of missed medications and dietary indiscretion for evaluation and IV diuretics. His RHC showed RA 20, PA 45/25(32), and PCWP 25, PA sat 48%, and CO/CI by Td 3/1.4  CPX done on day of admission (with elevated filling pressures) showed peak VO2 9.3 ml/kg/min 31% predicted, RER 1.03, VE/VOC2 slope 41, frequent PVCs noted during rest - less frequent with exercise. This was significantly worse compared to CPX last year. He was diuresed ~20 lb and was d/c'ed on torsemide 40/20 mg of torsemide (from 20 mg bid PTA). Entresto was cut in half, hydral and ISDN attempted while inpatient but not continued due to hypotension. He was d/c'ed 6/15-  weight at dc was ~200 lb. He called triage line 6/22 with c/o weight gain and torsemide was increased to 40 bid but patient actually increased from 20 mg bid to 40/20 mg as he did not understand DC instructions. He then presented to local ED yesterday as sx did not improve and was given IV lasix 40 mg x2.     Since hospital discharge and ED visit, he reports no change/relief in sx. His weight is up 9 lb on home scale since DC. He endorses BAJWA at 1 block, chest pressure when laying flat consistent with orthopnea. Does not have significant LE edema. He notes recently taking a lot of [place] which he just found out has a lot of sodium. No  palpitations, chest pain with exertion, ICD shocks, syncope.     PAST MEDICAL HISTORY:  Past Medical History:   Diagnosis Date     Acute systolic congestive heart failure (H) 2017     Diabetes (H)      HTN (hypertension)      Hyperlipidemia      Mitral regurgitation      Nocturnal oxygen desaturation 3/29/2018     Nonischemic cardiomyopathy (H) 2017     SHIRLEY (obstructive sleep apnea)      Pacemaker 2017    ICD 17       FAMILY HISTORY:  Family History   Problem Relation Age of Onset     Heart Failure Father       at age 86     Heart Failure Paternal Uncle       at 66      Myocardial Infarction Paternal Uncle       at age 62     Coronary Artery Disease Mother       during CABG at age 41       SOCIAL HISTORY:  Social History     Social History     Marital status: Single     Spouse name: N/A     Number of children: N/A     Years of education: N/A     Social History Main Topics     Smoking status: Former Smoker     Smokeless tobacco: Never Used     Alcohol use Not on file     Drug use: Not on file     Sexual activity: Not on file     Other Topics Concern     Not on file     Social History Narrative       CURRENT MEDICATIONS:    Current Outpatient Prescriptions on File Prior to Visit:  aspirin EC 81 MG EC tablet Take 1 tablet (81 mg) by mouth daily   atorvastatin (LIPITOR) 10 MG tablet Take 1 tablet (10 mg) by mouth daily   digoxin (LANOXIN) 250 MCG tablet TAKE 1 TABLET (250 MCG) BY MOUTH DAILY   metFORMIN (GLUCOPHAGE) 500 MG tablet Take 1 tablet (500 mg) by mouth 2 times daily (with meals)   metoprolol succinate (TOPROL-XL) 25 MG 24 hr tablet Take 1 tablet (25 mg) by mouth daily   potassium chloride SA (K-DUR/KLOR-CON M) 20 MEQ CR tablet Take 1 tablet (20 mEq) by mouth daily   sacubitril-valsartan (ENTRESTO) 24-26 MG per tablet Take 1 tablet by mouth 2 times daily   torsemide (DEMADEX) 20 MG tablet Take torsemide 40mg (2 tabs) in the AM and 20mg (1 tab) in the PM.   traZODone (DESYREL)  "100 MG tablet Take 1 tablet (100 mg) by mouth nightly as needed for sleep   sildenafil (VIAGRA) 50 MG tablet Take 1 tablet (50 mg) by mouth daily as needed (Patient not taking: Reported on 4/4/2018)     No current facility-administered medications on file prior to visit.     ROS:   CONSTITUTIONAL: Denies fever, chills, or weight fluctuations.   HEENT: Denies headache, vision changes, and changes in speech.   CV: Refer to HPI.   PULMONARY:Refer to HPI.   GI:Denies nausea, vomiting, diarrhea, and abdominal pain. Bowel movements are regular.   :Denies urinary alterations, dysuria, urinary frequency, hematuria, and abnormal drainage.   EXT:Denies lower extremity edema.   SKIN:Denies abnormal rashes or lesions.   MUSCULOSKELETAL:Denies upper or lower extremity weakness and pain.   NEUROLOGIC:Denies lightheadedness, dizziness, seizures, or upper or lower extremity paresthesia.     EXAM:  BP 92/60 (BP Location: Left arm, Patient Position: Chair, Cuff Size: Adult Large)  Pulse 66  Ht 1.753 m (5' 9\")  Wt 96.6 kg (213 lb)  SpO2 94%  BMI 31.45 kg/m2     GENERAL: Appears comfortable, in no acute distress.   HEENT: Eye symmetrical, no discharge or icterus bilaterally. Mucous membranes moist and without lesions.  CV: Irregularly irregular, +S1S2, no murmur, rub, or gallop. JVP mid neck at 60 degrees.   RESPIRATORY: Respirations regular, even, and unlabored. Lungs CTA throughout.   GI: Soft and non distended with normoactive bowel sounds present in all quadrants. No tenderness, rebound, guarding. No hepatomegaly.   EXTREMITIES: Trace peripheral edema. 2+ bilateral pedal pulses.   NEUROLOGIC: Alert and oriented x 3. No focal deficits.   MUSCULOSKELETAL: No joint swelling or tenderness.   SKIN: No jaundice. No rashes or lesions.     Labs, reviewed with patient in clinic today:  CBC RESULTS:  Lab Results   Component Value Date    WBC 7.9 06/15/2018    RBC 5.84 06/15/2018    HGB 17.3 06/15/2018    HCT 53.1 (H) 06/15/2018    MCV " 91 06/15/2018    MCH 29.6 06/15/2018    MCHC 32.6 06/15/2018    RDW 18.4 (H) 06/15/2018     06/15/2018       CMP RESULTS:  Lab Results   Component Value Date     06/25/2018    POTASSIUM 4.4 06/25/2018    CHLORIDE 96 06/25/2018    CO2 30 06/25/2018    ANIONGAP 10 06/25/2018     (H) 06/25/2018    BUN 28 06/25/2018    CR 1.41 (H) 06/25/2018    GFRESTIMATED 51 (L) 06/25/2018    GFRESTBLACK 62 06/25/2018    ASHLEE 9.3 06/25/2018    BILITOTAL 3.0 (H) 06/13/2018    ALBUMIN 3.7 06/13/2018    ALKPHOS 95 06/13/2018    ALT 16 06/13/2018    AST 25 06/13/2018        INR RESULTS:  Lab Results   Component Value Date    INR 1.23 (H) 06/15/2018       Lab Results   Component Value Date    MAG 2.5 (H) 06/15/2018     Lab Results   Component Value Date    NTBNPI 3382 (H) 06/11/2018     Lab Results   Component Value Date    NTBNP 1671 (H) 04/04/2018       Diagnostics:  TTE 6/15/17  The LVEF is visually estimated in the 30 % range (calculated, 28 %.) Severe  diffuse hypokinesis with inferior wall akinesis is present.  Mild to moderate right ventricular dilation is present. Global right  ventricular function is normal.  Mild aortic insufficiency is present.  Pulmonary artery systolic pressure is normal.  Dilation of the inferior vena cava is present with normal respiratory  variation in diameter. Estimated mean right atrial pressure is 8 mmHg.  No pericardial effusion is present.  Previous study not available for comparison.    RHC 6/11/18      CPX 6/11/18    Cor angio 3/29/17      ICD interrogation 5/29/18:    Scheduled [employer] single chamber ICD remote transmission received and reviewed. Device transmission sent per MD orders. His presenting rhythm is an irregular ventricular rhythm ~90 bpm. 10 NSVT episodes recorded lasting up to 5 seconds (log book states 12-16 seconds). Normal device function. = 0%. Lead trends appear stable. Battery estimates 12 years to LINCOLN. Pt notified of transmission results. Plan for  pt to RTC in 2 months as scheduled.  Remote ICD transmission    Assessment and Plan:   Mr. Chu is a 63 year old male with history of nonischemic cardiomyopathy who was transferred from [place] as an inpatient in 2017 for consideration of advanced therapies. He did well with medical therapy for almost a year but lately we have been struggling with fluid retention despite escalation of torsemide. His recent CPX showed significant limitation (peak VO2 9.3, on BB) but this was in the setting of significant hypervolemia. His last RHC showed elevated filling pressures (RA 20, PCWP 25) and low cardiac output (3 by Td, CI 1.4). He was diuresed 25 lb since cath but presents fluid up today with CRUZ, we will give IV lasix and increase torsemide and see in clinic next week. We will also decrease BB again given low outputs and borderline BP. He is seeing Dr Watkins soon, anticipate starting evaluation for LVAD soon given his advanced disease.     # Acute on chronic systolic heart failure/HFrEF secondary to NICM    Stage C. NYHA Class IIIB.    -Fluid status: hypervolemic, given 100 mg IV lasix today and 40 meq KCl, increase torsemide to 60 mg bid, will have him take metolazone tomorrow if weight still up  -ACEi/ARB/ARNI: Entresto 24/26 mg bid  -BB: decrease Toprol XL 12.5 mg daily given current decompensation/low output   -Aldosterone antagonist: d/c'ed due to hyperK  -Other: digoxin 250 mcg daily, will check level with next labs  -SCD prophylaxis: ICD  -NSAID use: contraindicated  -Sleep apnea: has CSA, not using device - APV contraindicated, recommend following up with sleep medicine   -Remote monitoring: referral to [facility] started today    # DM  Treated with metformin, he is on asa and statin for primary prevention       Follow up next week in [facility]       25 minutes spent face-to-face with patient, >50% in counseling and/or coordination of care as described above        Aster Purcell DNP,  NP-C  6/25/2018            DRISS LINN

## 2018-06-26 ENCOUNTER — CARE COORDINATION (OUTPATIENT)
Dept: CARDIOLOGY | Facility: CLINIC | Age: 63
End: 2018-06-26

## 2018-06-26 DIAGNOSIS — I50.23 ACUTE ON CHRONIC SYSTOLIC CHF (CONGESTIVE HEART FAILURE) (H): Primary | ICD-10-CM

## 2018-06-26 LAB — INTERPRETATION ECG - MUSE: NORMAL

## 2018-06-26 NOTE — PROGRESS NOTES
Reviewed below information with Aster Purcell NP, who gave following orders:    Date: 6/26/2018    Time of Call: 12:12 PM     Diagnosis:  Systolic heart failure     [ VORB ] Ordering provider: Aster Purcell NP  Order: Metolazone 1 time dose this afternoon prior to PM dose of Torsemide. Take extra 40 mEq Potassium with that dose. BMP tomorrow.      Order received by: Cecelia Palacios RN      Follow-up/additional notes: Patient verbalizes understanding and agrees with plan of care. Repeated plan back to me. Will get his labs tomorrow. Order faxed to [facility].  Patient has Metolazone 2.5 mg tabs at home already.   Cecelia Palacios, RN

## 2018-06-27 ENCOUNTER — TRANSFERRED RECORDS (OUTPATIENT)
Dept: HEALTH INFORMATION MANAGEMENT | Facility: CLINIC | Age: 63
End: 2018-06-27

## 2018-06-27 ENCOUNTER — CARE COORDINATION (OUTPATIENT)
Dept: CARDIOLOGY | Facility: CLINIC | Age: 63
End: 2018-06-27

## 2018-06-27 ENCOUNTER — PRE VISIT (OUTPATIENT)
Dept: CARDIOLOGY | Facility: CLINIC | Age: 63
End: 2018-06-27

## 2018-06-27 DIAGNOSIS — I50.21 ACUTE SYSTOLIC CONGESTIVE HEART FAILURE (H): Primary | ICD-10-CM

## 2018-06-27 LAB
ANION GAP SERPL CALCULATED.3IONS-SCNC: 18 MMOL/L (ref 6–20)
BUN SERPL-MCNC: 33 MG/DL (ref 9–20)
BUN/CREATININE RATIO: 22 (ref 15–20)
CALCIUM SERPL-MCNC: 9.6 MG/DL (ref 8.4–10)
CHLORIDE SERPLBLD-SCNC: 93 MMOL/L (ref 96–107)
CO2 SERPL-SCNC: 29 MMOL/L (ref 22–30)
CREAT SERPL-MCNC: 1.5 MG/DL (ref 0.7–1.3)
GFR SERPL CREATININE-BSD FRML MDRD: 47 ML/MIN/1.73M2
GLUCOSE SERPL-MCNC: 112 MG/DL (ref 74–106)
POTASSIUM SERPL-SCNC: 3.9 MMOL/L (ref 3.5–5.1)
SODIUM SERPL-SCNC: 136 MMOL/L (ref 135–145)

## 2018-06-27 NOTE — PROGRESS NOTES
"Called Danielito to see how he's feeling. States he was up voiding every 15 minutes all night long after taking Metolazone. He states he feels \"unbelievably better\". Weight is down to 205.7, down 3 lbs. Able to sleep better. Breathing feels better. Still not at base weight.  Labs reviewed with Aster Gill NP. Following order given:    Date: 6/27/2018    Time of Call: 3:11 PM     Diagnosis:  Systolic heart failure     [ VORB ] Ordering provider: Aster Gill NP  Order: continue Torsemide 60 mg BID. Take another 2.5 mg of Metolazone tomorrow if still not at dry weight or still having symptoms. Take extra 40 mEq Potassium with Metolazone.      Order received by: Cecelia Palacios RN      Follow-up/additional notes: order communicated to Danielito who states understanding. I will call him in the morning to further assess.          "

## 2018-06-27 NOTE — PROGRESS NOTES
Prior Authorization Request  [facility] HF System Prior Authorization   Diagnosis and ICD code: systolic heart failure class 3 - 150.21  New/Renewal/Insurance Change PA: New  Previously Tried and Failed Therapies: Optimized HF medications and RHC guided medication adjustments    Update: 8/30/18 - Patients insurance termed 7/31/2018. Unable to process PA until patient acquires new insurance.

## 2018-06-28 NOTE — PROGRESS NOTES
Called Danielito to see how he's feeling this morning. He states he's down another couple pounds to 203.2. His dry weight is 202. He's been voiding large amounts. He denies SOB. Does not feel like he's having any symptoms right now.   He does complain of passing a lot of gas and air in his belly causing discomfort. This has been going on for a couple of days and he is passing large amounts of gas frequently. Advised him that does not seem to be HF related and could try some GasX for discomfort.   We discussed plan to take Metolazone today if not at dry weight and still having symptoms. Is within a pound of dry weight and still on increased dose Torsemide. He will hold off on Metolazone and continue increased dose of Torsemide. Will check in tomorrow before the weekend to make sure he's still on track with his weights.   Cecelia Palacios RN

## 2018-06-29 ENCOUNTER — CARE COORDINATION (OUTPATIENT)
Dept: CARDIOLOGY | Facility: CLINIC | Age: 63
End: 2018-06-29

## 2018-06-29 DIAGNOSIS — I50.21 ACUTE SYSTOLIC CONGESTIVE HEART FAILURE (H): ICD-10-CM

## 2018-06-29 RX ORDER — TORSEMIDE 20 MG/1
60 TABLET ORAL 2 TIMES DAILY
Qty: 90 TABLET | Refills: 3 | Status: SHIPPED | OUTPATIENT
Start: 2018-06-29 | End: 2018-10-17

## 2018-06-29 RX ORDER — POTASSIUM CHLORIDE 1500 MG/1
TABLET, EXTENDED RELEASE ORAL
Qty: 90 TABLET | Refills: 3 | Status: ON HOLD | OUTPATIENT
Start: 2018-06-29 | End: 2018-10-29

## 2018-06-29 NOTE — PROGRESS NOTES
Mr. Chu states he is feeling good and his weight is down to 199. After discussing with Aster Purcell, the following orders given:     Date: 6/29/2018    Time of Call: 1:45 PM     Diagnosis:  Heart failure     [ TORB ] Ordering provider: Aster NARANJO CNP  Order: Decrease torsemide to 60 mg in the am and 40 mg in afternoon. Decrease potassium to half a pill once daily. Add a digoxin level to his next clinic appt.      Order received by: Annie Small RN     Follow-up/additional notes: I will call you Monday to check in.

## 2018-07-02 ENCOUNTER — CARE COORDINATION (OUTPATIENT)
Dept: CARDIOLOGY | Facility: CLINIC | Age: 63
End: 2018-07-02

## 2018-07-02 NOTE — PROGRESS NOTES
Pt called today to check in after we Decreased torsemide to 60 mg in the am and 40 mg in afternoon. Decrease potassium to half a pill once daily last Friday. Pt states his weight continues to remain less than 200. No c/o sob or swelling. Pt states I feel like we have finally gotten to a good place. Pt has a f/u appt tomorrow with Deysi Mattson.    COPD exacerbation

## 2018-07-03 ENCOUNTER — DOCUMENTATION ONLY (OUTPATIENT)
Dept: CARDIOLOGY | Facility: CLINIC | Age: 63
End: 2018-07-03

## 2018-07-03 ENCOUNTER — OFFICE VISIT (OUTPATIENT)
Dept: CARDIOLOGY | Facility: CLINIC | Age: 63
End: 2018-07-03
Attending: NURSE PRACTITIONER
Payer: COMMERCIAL

## 2018-07-03 VITALS
DIASTOLIC BLOOD PRESSURE: 52 MMHG | WEIGHT: 204.8 LBS | HEIGHT: 70 IN | BODY MASS INDEX: 29.32 KG/M2 | OXYGEN SATURATION: 97 % | HEART RATE: 71 BPM | SYSTOLIC BLOOD PRESSURE: 90 MMHG

## 2018-07-03 DIAGNOSIS — I50.21 ACUTE SYSTOLIC CONGESTIVE HEART FAILURE (H): ICD-10-CM

## 2018-07-03 DIAGNOSIS — I50.22 CHRONIC SYSTOLIC HEART FAILURE (H): ICD-10-CM

## 2018-07-03 LAB
ANION GAP SERPL CALCULATED.3IONS-SCNC: 8 MMOL/L (ref 3–14)
BUN SERPL-MCNC: 33 MG/DL (ref 7–30)
CALCIUM SERPL-MCNC: 9.4 MG/DL (ref 8.5–10.1)
CHLORIDE SERPL-SCNC: 97 MMOL/L (ref 94–109)
CO2 SERPL-SCNC: 31 MMOL/L (ref 20–32)
CREAT SERPL-MCNC: 1.4 MG/DL (ref 0.66–1.25)
GFR SERPL CREATININE-BSD FRML MDRD: 51 ML/MIN/1.7M2
GLUCOSE SERPL-MCNC: 93 MG/DL (ref 70–99)
POTASSIUM SERPL-SCNC: 4.1 MMOL/L (ref 3.4–5.3)
SODIUM SERPL-SCNC: 136 MMOL/L (ref 133–144)

## 2018-07-03 PROCEDURE — 80048 BASIC METABOLIC PNL TOTAL CA: CPT | Performed by: NURSE PRACTITIONER

## 2018-07-03 PROCEDURE — 99214 OFFICE O/P EST MOD 30 MIN: CPT | Mod: ZP | Performed by: NURSE PRACTITIONER

## 2018-07-03 PROCEDURE — 36415 COLL VENOUS BLD VENIPUNCTURE: CPT | Performed by: NURSE PRACTITIONER

## 2018-07-03 PROCEDURE — G0463 HOSPITAL OUTPT CLINIC VISIT: HCPCS | Mod: ZF

## 2018-07-03 ASSESSMENT — PAIN SCALES - GENERAL: PAINLEVEL: NO PAIN (0)

## 2018-07-03 NOTE — NURSING NOTE
Chief Complaint   Patient presents with     Follow Up For     1 week [facility] return, labs prior-ok per Amisha     Vitals were taken and medications were reconciled.     Earnest Benjamin, RMA  1:50 PM

## 2018-07-03 NOTE — NURSING NOTE
Diet: Patient instructed regarding a heart healthy diet, including discussion of reduced fat and sodium intake. Patient demonstrated understanding of this information and agreed to call with further questions or concerns.  Labs: Patient was given results of the laboratory testing obtained today. Patient was instructed to return for the next laboratory testing in one week if blood pressures continue to run low. He will have cardiac rehab send us a log of BPs early next week and if still low will repeat BMP next Wednesday. If blood pressures are at baseline does not need to have lab drawn and can follow up with June as scheduled.     Patient demonstrated understanding of this information and agreed to call with further questions or concerns.   Return Appointment: Patient given instructions regarding scheduling next clinic visit. Patient demonstrated understanding of this information and agreed to call with further questions or concerns.  Patient stated he understood all health information given and agreed to call with further questions or concerns.   Cecelia Palacios RN

## 2018-07-03 NOTE — PROGRESS NOTES
HPI:   Mr. Chu is a 61 year old male with a past medical history including HTN, SHIRLEY, Hyperlipidemia, NICM s/p ICD 17. He underwent hospitalization for decompensated heart failure with low output. RHC  consistent with mRA-20, RV-45/20, mPA-32, mPCWP-25, Munir CO-2.8, MUNIR CI-1.3, SVR-1860, and PVR-2.5. He was aggressively diuresed on inotropes and tailored to po medications prior to discharge. He presents to clinic for hospital follow up.     He denies fever, chills, lightheadedness, dizziness, chest pain, palpitations, SOB, BAJWA, PND, orthopnea, nausea, vomiting, diarrhea, hematochezia, melena, or LE edema. His weight remains stable at 200 lbs at home. He notes improvement in breathing with ability to walk about 4-5 blocks prior to needing rest. He continues to follow a low sodium diet. He occasionally will take 40 mg of Torsemide at bedtime at home.     PAST MEDICAL HISTORY:  Past Medical History:   Diagnosis Date     Acute systolic congestive heart failure (H) 2017     Diabetes (H)      HTN (hypertension)      Hyperlipidemia      Mitral regurgitation      Nocturnal oxygen desaturation 3/29/2018     Nonischemic cardiomyopathy (H) 2017     SHIRLEY (obstructive sleep apnea)      Pacemaker 2017    ICD 17       FAMILY HISTORY:  Family History   Problem Relation Age of Onset     Heart Failure Father       at age 86     Heart Failure Paternal Uncle       at 66      Myocardial Infarction Paternal Uncle       at age 62     Coronary Artery Disease Mother       during CABG at age 41       SOCIAL HISTORY:  Social History     Social History     Marital status: Single     Spouse name: N/A     Number of children: N/A     Years of education: N/A     Social History Main Topics     Smoking status: Former Smoker     Smokeless tobacco: Never Used      Comment: quit 3/2017     Alcohol use None      Comment: social     Drug use: None     Sexual activity: Not Asked     Other Topics Concern     None  "    Social History Narrative       CURRENT MEDICATIONS:  Outpatient Medications Prior to Visit   Medication Sig Dispense Refill     aspirin EC 81 MG EC tablet Take 1 tablet (81 mg) by mouth daily 30 tablet 3     atorvastatin (LIPITOR) 10 MG tablet Take 1 tablet (10 mg) by mouth daily 90 tablet 3     digoxin (LANOXIN) 250 MCG tablet TAKE 1 TABLET (250 MCG) BY MOUTH DAILY 90 tablet 3     metFORMIN (GLUCOPHAGE) 500 MG tablet Take 1 tablet (500 mg) by mouth 2 times daily (with meals)       metoprolol succinate (TOPROL-XL) 25 MG 24 hr tablet Take 0.5 tablets (12.5 mg) by mouth daily 90 tablet 3     potassium chloride SA (K-DUR/KLOR-CON M) 20 MEQ CR tablet Take half a tablet once a day. 90 tablet 3     sacubitril-valsartan (ENTRESTO) 24-26 MG per tablet Take 1 tablet by mouth 2 times daily       sildenafil (VIAGRA) 50 MG tablet Take 1 tablet (50 mg) by mouth daily as needed 20 tablet 1     torsemide (DEMADEX) 20 MG tablet Take 3 tablets (60 mg) by mouth 2 times daily Take torsemide 60 mg in the am and 40 mg in the pm 90 tablet 3     traZODone (DESYREL) 100 MG tablet Take 1 tablet (100 mg) by mouth nightly as needed for sleep 90 tablet 1     No facility-administered medications prior to visit.        ROS:   CONSTITUTIONAL: Denies fever, chills, fatigue, or weight fluctuations.   HEENT: Denies headache, vision changes, and changes in speech.   CV: Refer to HPI.   PULMONARY:Denies shortness of breath, cough, or previous TB exposure.   GI:Denies nausea, vomiting, diarrhea, and abdominal pain. Bowel movements are regular.   :Denies urinary alterations, dysuria, urinary frequency, hematuria, and abnormal drainage.   EXT:Denies lower extremity edema.   SKIN:Denies abnormal rashes or lesions.   MUSCULOSKELETAL:Denies upper or lower extremity weakness and pain.   NEUROLOGIC:Denies lightheadedness, dizziness, seizures, or upper or lower extremity paresthesia.     EXAM:  BP (!) 79/48  Pulse 71  Ht 1.772 m (5' 9.75\")  Wt 92.9 kg " (204 lb 12.8 oz)  SpO2 97%  BMI 29.6 kg/m2 Manual repeat BP check 90/52.  GENERAL: Appears alert and oriented times three.   HEENT: Eye symmetrical and free of discharge bilaterally. Mucous membranes moist and without lesions.  NECK: Supple and without lymphadenopathy. JVD 10 cm.   CV: RRR, S1S2 present without murmur, rub, or gallop.   RESPIRATORY: Respirations regular, even, and unlabored. Lungs CTA throughout.   GI: Soft and non distended with normoactive bowel sounds present in all quadrants. No tenderness, rebound, guarding. No organomegaly.   EXTREMITIES: No peripheral edema. 2+ bilateral pedal pulses.   NEUROLOGIC: Alert and orientated x 3. CN II-XII grossly intact. No focal deficits.   MUSCULOSKELETAL: No joint swelling or tenderness.   SKIN: No jaundice. No rashes or lesions.     Labs:  CBC RESULTS:  Lab Results   Component Value Date    WBC 7.9 06/15/2018    RBC 5.84 06/15/2018    HGB 17.3 06/15/2018    HCT 53.1 (H) 06/15/2018    MCV 91 06/15/2018    MCH 29.6 06/15/2018    MCHC 32.6 06/15/2018    RDW 18.4 (H) 06/15/2018     06/15/2018       CMP RESULTS:  Lab Results   Component Value Date     07/03/2018    POTASSIUM 4.1 07/03/2018    CHLORIDE 97 07/03/2018    CO2 31 07/03/2018    ANIONGAP 8 07/03/2018    GLC 93 07/03/2018    BUN 33 (H) 07/03/2018    CR 1.40 (H) 07/03/2018    GFRESTIMATED 51 (L) 07/03/2018    GFRESTBLACK 62 07/03/2018    ASHLEE 9.4 07/03/2018    BILITOTAL 3.0 (H) 06/13/2018    ALBUMIN 3.7 06/13/2018    ALKPHOS 95 06/13/2018    ALT 16 06/13/2018    AST 25 06/13/2018        INR RESULTS:  Lab Results   Component Value Date    INR 1.23 (H) 06/15/2018       Lab Results   Component Value Date    MAG 2.5 (H) 06/15/2018     Lab Results   Component Value Date    NTBNPI 3382 (H) 06/11/2018     Lab Results   Component Value Date    NTBNP 1671 (H) 04/04/2018       Diagnostics:  Echo 6/15/17:  The LVEF is visually estimated in the 30 % range (calculated, 28 %.) Severe  diffuse hypokinesis  with inferior wall akinesis is present.  Mild to moderate right ventricular dilation is present. Global right  ventricular function is normal.  Mild aortic insufficiency is present.  Pulmonary artery systolic pressure is normal.  Dilation of the inferior vena cava is present with normal respiratory  variation in diameter. Estimated mean right atrial pressure is 8 mmHg.  No pericardial effusion is present.  Previous study not available for comparison.    RHC 6/11/18:  Results (pressures in mmHg)  RA: 26/32/20  RV 45/20  PA 45/25/32;  PA Sat 48.4%  PCWP 30/37/25;  PCW Sat 96.8%  Timmy CO/CI 2.8/1.3 L/min; TD CO 3.0/1.4 L/min  PVR 2.5 bonilla; TPR 11.4 bonilla  Hb 14.5 g/dL  NIBP 108/80/90  SVR 1860 dynes*sec/cm^5   Complications: None   Blood loss: Minimal blood loss  Conclusions:  1. Severely elevated right and moderately elevated left sided filling pressures.  2. Mildly elevated pulmonary artery pressures.  3. Moderately reduced cardiac output.    CPX 6/11/18:  Functional capacity 31 % predicted with RER 1.03 with VE/VCo2 of 41.37.    Assessment and Plan:   Mr. Chu is a 61 year old male with a past medical history including HTN, SHIRLEY, Hyperlipidemia, NICM s/p ICD 4/7/17. He presents to clinic following hospitalization for decompensated heart failure and is mildly hypotensive, but feels well.     Chronic systolic heart failure secondary to unknown etiology, likely viral.   Stage D, NYHA Class III  ACEi/ARB: Entresto 24/26 mg po BID.   BB Toprol XL 12.5 mg po daily. (note prior to admission dose 25 mg).   Aldosterone antagonist Aldactone discontinued in setting of hyperkalemia.   SCD prophylaxis ICD  Fluid status: Euvolemic. Continue Torsemide at 60 mg in AM and 40 mg at HS.   Sleep Apnea Evaluation: SHIRLEY on CPAP.   - Repeat BP check with cardiac rehab. If BP soft repeat BMP in 1 week to ensure renal perfusion. Pt is reluctant to reduce medications at this time and remains asymptomatic.   - Continue Digoxin 250 mcg po daily.        Moderate Mitral Regurgitation.   - Repeat Echo consistent with mild mitral insufficiency 6/15/17.      Hyperlipidemia.   - Continue Lipitor.       HTN.   - Continue Toprol XL and Lisinopril.      DM Type II.   - Management per PCP.      Situational Depression. Secondary to Chronic illness with restraints to his personal life.    - Management per PCP.     Follow up as scheduled with Dr. Watkins 7/19.      Deysi Mattson  7/3/2018          CC  SELF, REFERRED

## 2018-07-03 NOTE — LETTER
7/3/2018      RE: Danielito Chu  [id number] [place] [address]       Dear Colleague,    Thank you for the opportunity to participate in the care of your patient, Danielito Chu, at the [facility] HEART [facility] acute hospital. Please see a copy of my visit note below.    HPI:   Mr. Chu is a 61 year old male with a past medical history including HTN, SHIRLEY, Hyperlipidemia, NICM s/p ICD 17. He underwent hospitalization for decompensated heart failure with low output. RHC  consistent with mRA-20, RV-45/20, mPA-32, mPCWP-25, Munir CO-2.8, MUNIR CI-1.3, SVR-1860, and PVR-2.5. He was aggressively diuresed on inotropes and tailored to po medications prior to discharge. He presents to clinic for hospital follow up.     He denies fever, chills, lightheadedness, dizziness, chest pain, palpitations, SOB, BAJWA, PND, orthopnea, nausea, vomiting, diarrhea, hematochezia, melena, or LE edema. His weight remains stable at 200 lbs at home. He notes improvement in breathing with ability to walk about 4-5 blocks prior to needing rest. He continues to follow a low sodium diet. He occasionally will take 40 mg of Torsemide at bedtime at home.     PAST MEDICAL HISTORY:  Past Medical History:   Diagnosis Date     Acute systolic congestive heart failure (H) 2017     Diabetes (H)      HTN (hypertension)      Hyperlipidemia      Mitral regurgitation      Nocturnal oxygen desaturation 3/29/2018     Nonischemic cardiomyopathy (H) 2017     SHIRLEY (obstructive sleep apnea)      Pacemaker 2017    ICD 17       FAMILY HISTORY:  Family History   Problem Relation Age of Onset     Heart Failure Father       at age 86     Heart Failure Paternal Uncle       at 66      Myocardial Infarction Paternal Uncle       at age 62     Coronary Artery Disease Mother       during CABG at age 41       SOCIAL HISTORY:  Social History     Social History     Marital status: Single     Spouse  name: N/A     Number of children: N/A     Years of education: N/A     Social History Main Topics     Smoking status: Former Smoker     Smokeless tobacco: Never Used      Comment: quit 3/2017     Alcohol use None      Comment: social     Drug use: None     Sexual activity: Not Asked     Other Topics Concern     None     Social History Narrative       CURRENT MEDICATIONS:  Outpatient Medications Prior to Visit   Medication Sig Dispense Refill     aspirin EC 81 MG EC tablet Take 1 tablet (81 mg) by mouth daily 30 tablet 3     atorvastatin (LIPITOR) 10 MG tablet Take 1 tablet (10 mg) by mouth daily 90 tablet 3     digoxin (LANOXIN) 250 MCG tablet TAKE 1 TABLET (250 MCG) BY MOUTH DAILY 90 tablet 3     metFORMIN (GLUCOPHAGE) 500 MG tablet Take 1 tablet (500 mg) by mouth 2 times daily (with meals)       metoprolol succinate (TOPROL-XL) 25 MG 24 hr tablet Take 0.5 tablets (12.5 mg) by mouth daily 90 tablet 3     potassium chloride SA (K-DUR/KLOR-CON M) 20 MEQ CR tablet Take half a tablet once a day. 90 tablet 3     sacubitril-valsartan (ENTRESTO) 24-26 MG per tablet Take 1 tablet by mouth 2 times daily       sildenafil (VIAGRA) 50 MG tablet Take 1 tablet (50 mg) by mouth daily as needed 20 tablet 1     torsemide (DEMADEX) 20 MG tablet Take 3 tablets (60 mg) by mouth 2 times daily Take torsemide 60 mg in the am and 40 mg in the pm 90 tablet 3     traZODone (DESYREL) 100 MG tablet Take 1 tablet (100 mg) by mouth nightly as needed for sleep 90 tablet 1     No facility-administered medications prior to visit.        ROS:   CONSTITUTIONAL: Denies fever, chills, fatigue, or weight fluctuations.   HEENT: Denies headache, vision changes, and changes in speech.   CV: Refer to HPI.   PULMONARY:Denies shortness of breath, cough, or previous TB exposure.   GI:Denies nausea, vomiting, diarrhea, and abdominal pain. Bowel movements are regular.   :Denies urinary alterations, dysuria, urinary frequency, hematuria, and abnormal drainage.  "  EXT:Denies lower extremity edema.   SKIN:Denies abnormal rashes or lesions.   MUSCULOSKELETAL:Denies upper or lower extremity weakness and pain.   NEUROLOGIC:Denies lightheadedness, dizziness, seizures, or upper or lower extremity paresthesia.     EXAM:  BP (!) 79/48  Pulse 71  Ht 1.772 m (5' 9.75\")  Wt 92.9 kg (204 lb 12.8 oz)  SpO2 97%  BMI 29.6 kg/m2 Manual repeat BP check 90/52.  GENERAL: Appears alert and oriented times three.   HEENT: Eye symmetrical and free of discharge bilaterally. Mucous membranes moist and without lesions.  NECK: Supple and without lymphadenopathy. JVD 10 cm.   CV: RRR, S1S2 present without murmur, rub, or gallop.   RESPIRATORY: Respirations regular, even, and unlabored. Lungs CTA throughout.   GI: Soft and non distended with normoactive bowel sounds present in all quadrants. No tenderness, rebound, guarding. No organomegaly.   EXTREMITIES: No peripheral edema. 2+ bilateral pedal pulses.   NEUROLOGIC: Alert and orientated x 3. CN II-XII grossly intact. No focal deficits.   MUSCULOSKELETAL: No joint swelling or tenderness.   SKIN: No jaundice. No rashes or lesions.     Labs:  CBC RESULTS:  Lab Results   Component Value Date    WBC 7.9 06/15/2018    RBC 5.84 06/15/2018    HGB 17.3 06/15/2018    HCT 53.1 (H) 06/15/2018    MCV 91 06/15/2018    MCH 29.6 06/15/2018    MCHC 32.6 06/15/2018    RDW 18.4 (H) 06/15/2018     06/15/2018       CMP RESULTS:  Lab Results   Component Value Date     07/03/2018    POTASSIUM 4.1 07/03/2018    CHLORIDE 97 07/03/2018    CO2 31 07/03/2018    ANIONGAP 8 07/03/2018    GLC 93 07/03/2018    BUN 33 (H) 07/03/2018    CR 1.40 (H) 07/03/2018    GFRESTIMATED 51 (L) 07/03/2018    GFRESTBLACK 62 07/03/2018    ASHLEE 9.4 07/03/2018    BILITOTAL 3.0 (H) 06/13/2018    ALBUMIN 3.7 06/13/2018    ALKPHOS 95 06/13/2018    ALT 16 06/13/2018    AST 25 06/13/2018        INR RESULTS:  Lab Results   Component Value Date    INR 1.23 (H) 06/15/2018       Lab Results "   Component Value Date    MAG 2.5 (H) 06/15/2018     Lab Results   Component Value Date    NTBNPI 3382 (H) 06/11/2018     Lab Results   Component Value Date    NTBNP 1671 (H) 04/04/2018       Diagnostics:  Echo 6/15/17:  The LVEF is visually estimated in the 30 % range (calculated, 28 %.) Severe  diffuse hypokinesis with inferior wall akinesis is present.  Mild to moderate right ventricular dilation is present. Global right  ventricular function is normal.  Mild aortic insufficiency is present.  Pulmonary artery systolic pressure is normal.  Dilation of the inferior vena cava is present with normal respiratory  variation in diameter. Estimated mean right atrial pressure is 8 mmHg.  No pericardial effusion is present.  Previous study not available for comparison.    RHC 6/11/18:  Results (pressures in mmHg)  RA: 26/32/20  RV 45/20  PA 45/25/32;  PA Sat 48.4%  PCWP 30/37/25;  PCW Sat 96.8%  Timmy CO/CI 2.8/1.3 L/min; TD CO 3.0/1.4 L/min  PVR 2.5 bonilla; TPR 11.4 bonilla  Hb 14.5 g/dL  NIBP 108/80/90  SVR 1860 dynes*sec/cm^5   Complications: None   Blood loss: Minimal blood loss  Conclusions:  1. Severely elevated right and moderately elevated left sided filling pressures.  2. Mildly elevated pulmonary artery pressures.  3. Moderately reduced cardiac output.    CPX 6/11/18:  Functional capacity 31 % predicted with RER 1.03 with VE/VCo2 of 41.37.    Assessment and Plan:   Mr. Chu is a 61 year old male with a past medical history including HTN, SHIRLEY, Hyperlipidemia, NICM s/p ICD 4/7/17. He presents to clinic following hospitalization for decompensated heart failure and is mildly hypotensive, but feels well.     Chronic systolic heart failure secondary to unknown etiology, likely viral.   Stage D, NYHA Class III  ACEi/ARB: Entresto 24/26 mg po BID.   BB Toprol XL 12.5 mg po daily. (note prior to admission dose 25 mg).   Aldosterone antagonist Aldactone discontinued in setting of hyperkalemia.   SCD prophylaxis ICD  Fluid status:  Euvolemic. Continue Torsemide at 60 mg in AM and 40 mg at HS.   Sleep Apnea Evaluation: SHIRLEY on CPAP.   - Repeat BP check with cardiac rehab. If BP soft repeat BMP in 1 week to ensure renal perfusion. Pt is reluctant to reduce medications at this time and remains asymptomatic.   - Continue Digoxin 250 mcg po daily.       Moderate Mitral Regurgitation.   - Repeat Echo consistent with mild mitral insufficiency 6/15/17.      Hyperlipidemia.   - Continue Lipitor.       HTN.   - Continue Toprol XL and Lisinopril.      DM Type II.   - Management per PCP.      Situational Depression. Secondary to Chronic illness with restraints to his personal life.    - Management per PCP.     Follow up as scheduled with Dr. Watkins 7/19.  Please do not hesitate to contact me if you have any questions/concerns.     Sincerely,     JOSE A Marion CNP

## 2018-07-03 NOTE — PATIENT INSTRUCTIONS
"You were seen today in the Cardiovascular Clinic at the [facility].     Cardiology Providers you saw during your visit: Deysi NARANJO CNP      Follow up and medication changes:    1. Continue to take Torsemide 60 mg in the morning/40 mg in the afternoon.   2. Monitor BPs at Cardiac Rehab. If lower then baseline we'd like to check a Basic Metabolic Panel lab in 1 week.    - Can have [facility] fax us BPs at [phone] or if easier can call us at [phone] (option #1, option #3) by early next week.      3. If Blood Pressures are at your baseline no lab needed and follow up with Dr Watkins on  as scheduled.      Results for JUAN SHEN (MRN [id number]) as of 7/3/2018 14:19   Ref. Range 7/3/2018 12:18   Sodium Latest Ref Range: 133 - 144 mmol/L 136   Potassium Latest Ref Range: 3.4 - 5.3 mmol/L 4.1   Chloride Latest Ref Range: 94 - 109 mmol/L 97   Carbon Dioxide Latest Ref Range: 20 - 32 mmol/L 31   Urea Nitrogen Latest Ref Range: 7 - 30 mg/dL 33 (H)   Creatinine Latest Ref Range: 0.66 - 1.25 mg/dL 1.40 (H)   GFR Estimate Latest Ref Range: >60 mL/min/1.7m2 51 (L)   GFR Estimate If Black Latest Ref Range: >60 mL/min/1.7m2 62   Calcium Latest Ref Range: 8.5 - 10.1 mg/dL 9.4   Anion Gap Latest Ref Range: 3 - 14 mmol/L 8   Glucose Latest Ref Range: 70 - 99 mg/dL 93           Please limit your fluid intake to 2 L (68 ounces) daily.  2 Liters a day = 8.5 cups, or 68 ounces.  Please limit your salt intake to 2 grams a day or less.     If you gain 2# in 24 hours or 5# in one week call Cecelia Palacios RN so we can adjust your medications as needed over the phone.     Please feel free to call me with any questions or concerns.       Cecelia Palacios RN  [facility]  Cardiology Care Coordinator-Heart Failure Clinic     Questions and [phone].   First press #1 for the University and then press #3 for \"Medical Questions\" to reach " us Cardiology Nurses.      On Call Cardiologist for after hours or on weekends: [phone]   option #4 and ask to speak to the on-call Cardiologist. Inform them you are a CORE/heart failure patient at the [place].           If you need a medication refill please contact your pharmacy.  Please allow 3 business days for your refill to be completed.  _______________________________________________________  [facility] Cardiomyopathy, Optimization, Rehabilitation, Education   The [facility]IN[facility] is a heart failure specialty clinic within the [facility] where you will work with specialized nurse practitioners dedicated to helping patients with heart failure carefully adjust medications, receive education, and learn who and when to call if symptoms develop. They specialize in helping you better understand your condition, slow the progression of your disease, improve the length and quality of your life, help you detect future heart problems before they become life threatening, and avoid hospitalizations.  As always, thank you for trusting us with your health care needs!

## 2018-07-03 NOTE — MR AVS SNAPSHOT
After Visit Summary   7/3/2018    Juan Chu    MRN: [id number]           Patient Information     Date Of Birth          1955        Visit Information        Provider Department      7/3/2018 2:00 PM Deysi Mattson APRN CNP [facility]        Today's Diagnoses     Chronic systolic heart failure (H)          Care Instructions    You were seen today in the Cardiovascular Clinic at the [facility].     Cardiology Providers you saw during your visit: Deysi NARANJO CNP      Follow up and medication changes:    1. Continue to take Torsemide 60 mg in the morning/40 mg in the afternoon.   2. Monitor BPs at Cardiac Rehab. If lower then baseline we'd like to check a Basic Metabolic Panel lab in 1 week.    - Can have [facility] fax us BPs at [phone] or if easier can call us at [phone] (option #1, option #3) by early next week.      3. If Blood Pressures are at your baseline no lab needed and follow up with Dr Watkins on 7/19 as scheduled.      Results for JUAN CHU (MRN [id number]) as of 7/3/2018 14:19   Ref. Range 7/3/2018 12:18   Sodium Latest Ref Range: 133 - 144 mmol/L 136   Potassium Latest Ref Range: 3.4 - 5.3 mmol/L 4.1   Chloride Latest Ref Range: 94 - 109 mmol/L 97   Carbon Dioxide Latest Ref Range: 20 - 32 mmol/L 31   Urea Nitrogen Latest Ref Range: 7 - 30 mg/dL 33 (H)   Creatinine Latest Ref Range: 0.66 - 1.25 mg/dL 1.40 (H)   GFR Estimate Latest Ref Range: >60 mL/min/1.7m2 51 (L)   GFR Estimate If Black Latest Ref Range: >60 mL/min/1.7m2 62   Calcium Latest Ref Range: 8.5 - 10.1 mg/dL 9.4   Anion Gap Latest Ref Range: 3 - 14 mmol/L 8   Glucose Latest Ref Range: 70 - 99 mg/dL 93           Please limit your fluid intake to 2 L (68 ounces) daily.  2 Liters a day = 8.5 cups, or 68 ounces.  Please limit your salt intake to 2 grams a day or less.     If you gain 2# in 24 hours or 5# in one week call Cecelia  "ARAVIND Palacios so we can adjust your medications as needed over the phone.     Please feel free to call me with any questions or concerns.       Cecelia Palacios RN  [facility]  Cardiology Care Coordinator-Heart Failure Clinic     Questions and [phone].   First press #1 for the University and then press #3 for \"Medical Questions\" to reach us Cardiology Nurses.      On Call Cardiologist for after hours or on weekends: [phone]   option #4 and ask to speak to the on-call Cardiologist. Inform them you are a CORE/heart failure patient at the [facility].           If you need a medication refill please contact your pharmacy.  Please allow 3 business days for your refill to be completed.  _______________________________________________________  C.O.R.E. CLINIC Cardiomyopathy, Optimization, Rehabilitation, Education   The C.O.R.E. CLINIC is a heart failure specialty clinic within the [facility] where you will work with specialized nurse practitioners dedicated to helping patients with heart failure carefully adjust medications, receive education, and learn who and when to call if symptoms develop. They specialize in helping you better understand your condition, slow the progression of your disease, improve the length and quality of your life, help you detect future heart problems before they become life threatening, and avoid hospitalizations.  As always, thank you for trusting us with your health care needs!             Follow-ups after your visit        Your next 10 appointments already scheduled     2018  2:00 PM CDT   Lab with  LAB    [facility])    [address] [place] [id number] [phone]            2018  2:30 PM CDT   (Arrive by 2:15 PM)   Implanted Defibulator with  Cv Device 1   [facility]    [address] [place] [address] " "[address]  [address] [place] [id number] [phone]            Jul 19, 2018  3:00 PM CDT   (Arrive by 2:45 PM)   RETURN HEART FAILURE with Lorena Watkins MD   [facility] ([facility])    [address]  [address] [place] [id number] [phone]              Who to contact     If you have questions or need follow up information about today's clinic visit or your schedule please contact [facility] directly at [phone].  Normal or non-critical lab and imaging results will be communicated to you by MyChart, letter or phone within 4 business days after the clinic has received the results. If you do not hear from us within 7 days, please contact the clinic through MyChart or phone. If you have a critical or abnormal lab result, we will notify you by phone as soon as possible.  Submit refill requests through [employer] or call your pharmacy and they will forward the refill request to us. Please allow 3 business days for your refill to be completed.          Additional Information About Your Visit        Care EveryWhere ID     This is your Care EveryWhere ID. This could be used by other organizations to access your [facility] medical records  [id number]        Your Vitals Were     Pulse Height Pulse Oximetry BMI (Body Mass Index)          71 1.772 m (5' 9.75\") 97% 29.6 kg/m2         Blood Pressure from Last 3 Encounters:   07/03/18 90/52   06/25/18 92/60   06/15/18 92/61    Weight from Last 3 Encounters:   07/03/18 92.9 kg (204 lb 12.8 oz)   06/25/18 96.6 kg (213 lb)   06/15/18 91.4 kg (201 lb 9.6 oz)              We Performed the Following     Follow-Up with [facility]        Primary Care Provider Office Phone # Fax #    Bashir Hines [phone]       [address] [address]        Equal Access to Services     ALEX RODGERS AH: Eddie Tsai, waaxda luqadaha, qaybta michelle leary, " aaron burnsayden maya'aan ah. So [facility] [phone].    ATENCIÓN: Si jerardola jeremy, tiene a stokes disposición servicios gratuitos de asistencia lingüística. Kam ruiz [phone].    We comply with applicable federal civil rights laws and [place] laws. We do not discriminate on the basis of race, color, national origin, age, disability, sex, sexual orientation, or gender identity.            Thank you!     Thank you for choosing [facility]  for your care. Our goal is always to provide you with excellent care. Hearing back from our patients is one way we can continue to improve our services. Please take a few minutes to complete the written survey that you may receive in the mail after your visit with us. Thank you!             Your Updated Medication List - Protect others around you: Learn how to safely use, store and throw away your medicines at www.disposemymeds.org.          This list is accurate as of 7/3/18  2:37 PM.  Always use your most recent med list.                   Brand Name Dispense Instructions for use Diagnosis    aspirin 81 MG EC tablet     30 tablet    Take 1 tablet (81 mg) by mouth daily    Acute systolic congestive heart failure (H)       atorvastatin 10 MG tablet    LIPITOR    90 tablet    Take 1 tablet (10 mg) by mouth daily    Acute systolic congestive heart failure (H)       digoxin 250 MCG tablet    LANOXIN    90 tablet    TAKE 1 TABLET (250 MCG) BY MOUTH DAILY    Acute on chronic systolic heart failure (H)       metFORMIN 500 MG tablet    GLUCOPHAGE     Take 1 tablet (500 mg) by mouth 2 times daily (with meals)    Chronic systolic heart failure (H), Acute systolic congestive heart failure (H)       metoprolol succinate 25 MG 24 hr tablet    TOPROL-XL    90 tablet    Take 0.5 tablets (12.5 mg) by mouth daily    Acute on chronic systolic heart failure (H)       potassium chloride SA 20 MEQ CR tablet    K-DUR/KLOR-CON M    90 tablet    Take half a tablet once a day.     Acute systolic congestive heart failure (H)       sacubitril-valsartan 24-26 MG per tablet    ENTRESTO     Take 1 tablet by mouth 2 times daily        sildenafil 50 MG tablet    VIAGRA    20 tablet    Take 1 tablet (50 mg) by mouth daily as needed    Drug-induced impotence       torsemide 20 MG tablet    DEMADEX    90 tablet    Take 3 tablets (60 mg) by mouth 2 times daily Take torsemide 60 mg in the am and 40 mg in the pm    Acute systolic congestive heart failure (H)       traZODone 100 MG tablet    DESYREL    90 tablet    Take 1 tablet (100 mg) by mouth nightly as needed for sleep    Insomnia, unspecified type

## 2018-07-10 ENCOUNTER — CARE COORDINATION (OUTPATIENT)
Dept: CARDIOLOGY | Facility: CLINIC | Age: 63
End: 2018-07-10

## 2018-07-10 NOTE — PROGRESS NOTES
Mr. Chu contacted to discuss blood pressures. We have not received a fax from [facility]. Mr. Chu states his blood pressures have been /60's. He has no c/o fatigue, light headedness, or dizziness. Patient states he feels like we have gotten to a good point. He feels that his care has stabilized. His next cardiac rehab appointment is tomorrow 7/11. He will ask [facility] in [place] to fax blood pressures again.  Plan is to continue with Dr. Watkins appointment on 7/19. Pt to call with any questions or concerns in the meantime.

## 2018-07-12 ENCOUNTER — TELEPHONE (OUTPATIENT)
Dept: CARDIOLOGY | Facility: CLINIC | Age: 63
End: 2018-07-12

## 2018-07-14 ENCOUNTER — TELEPHONE (OUTPATIENT)
Dept: CARDIOLOGY | Facility: CLINIC | Age: 63
End: 2018-07-14

## 2018-07-14 NOTE — TELEPHONE ENCOUNTER
Reviewed phone call from patient due to concern for gout. Patient was seen by his PCP on 7/12 and given prednisone 20 mg daily x 3 days. Uric acid was 11.4. Patient endorsed worsening foot pain and notes that he already took his last prednisone dose. He wanted to know whether he could stop at torsemide. Advised patient to continue torsemide and noted that diuretics can increase risk of gout but he should not discontinue it at this time. Advised patient present to his PCP/urgent care to reevaluate his foot to see whether he has worsening gout symptoms.      Celine Rose MD, PhD  Cardiology Fellow  [phone]

## 2018-07-17 DIAGNOSIS — I50.23 ACUTE ON CHRONIC SYSTOLIC CHF (CONGESTIVE HEART FAILURE) (H): Primary | ICD-10-CM

## 2018-07-17 NOTE — PROGRESS NOTES
Called patient back, he reported that the GOUT has resolved. He has no questions/concerns at this time.

## 2018-07-19 ENCOUNTER — OFFICE VISIT (OUTPATIENT)
Dept: CARDIOLOGY | Facility: CLINIC | Age: 63
End: 2018-07-19
Attending: NURSE PRACTITIONER
Payer: COMMERCIAL

## 2018-07-19 ENCOUNTER — ALLIED HEALTH/NURSE VISIT (OUTPATIENT)
Dept: CARDIOLOGY | Facility: CLINIC | Age: 63
End: 2018-07-19
Attending: INTERNAL MEDICINE
Payer: COMMERCIAL

## 2018-07-19 VITALS
BODY MASS INDEX: 29.62 KG/M2 | HEIGHT: 69 IN | SYSTOLIC BLOOD PRESSURE: 83 MMHG | HEART RATE: 88 BPM | DIASTOLIC BLOOD PRESSURE: 57 MMHG | WEIGHT: 200 LBS | RESPIRATION RATE: 18 BRPM | OXYGEN SATURATION: 100 %

## 2018-07-19 DIAGNOSIS — I50.23 ACUTE ON CHRONIC SYSTOLIC CHF (CONGESTIVE HEART FAILURE) (H): ICD-10-CM

## 2018-07-19 DIAGNOSIS — I50.21 ACUTE SYSTOLIC CONGESTIVE HEART FAILURE (H): ICD-10-CM

## 2018-07-19 DIAGNOSIS — I50.22 CHRONIC SYSTOLIC HEART FAILURE (H): Primary | ICD-10-CM

## 2018-07-19 DIAGNOSIS — I42.8 NONISCHEMIC CARDIOMYOPATHY (H): ICD-10-CM

## 2018-07-19 LAB
ANION GAP SERPL CALCULATED.3IONS-SCNC: 8 MMOL/L (ref 3–14)
BUN SERPL-MCNC: 34 MG/DL (ref 7–30)
CALCIUM SERPL-MCNC: 9.2 MG/DL (ref 8.5–10.1)
CHLORIDE SERPL-SCNC: 100 MMOL/L (ref 94–109)
CO2 SERPL-SCNC: 30 MMOL/L (ref 20–32)
CREAT SERPL-MCNC: 1.29 MG/DL (ref 0.66–1.25)
GFR SERPL CREATININE-BSD FRML MDRD: 56 ML/MIN/1.7M2
GLUCOSE SERPL-MCNC: 86 MG/DL (ref 70–99)
NT-PROBNP SERPL-MCNC: 1407 PG/ML (ref 0–125)
POTASSIUM SERPL-SCNC: 4 MMOL/L (ref 3.4–5.3)
SODIUM SERPL-SCNC: 139 MMOL/L (ref 133–144)

## 2018-07-19 PROCEDURE — 93282 PRGRMG EVAL IMPLANTABLE DFB: CPT | Mod: ZF

## 2018-07-19 PROCEDURE — 93282 PRGRMG EVAL IMPLANTABLE DFB: CPT | Mod: 26 | Performed by: INTERNAL MEDICINE

## 2018-07-19 PROCEDURE — 80048 BASIC METABOLIC PNL TOTAL CA: CPT | Performed by: NURSE PRACTITIONER

## 2018-07-19 PROCEDURE — 36415 COLL VENOUS BLD VENIPUNCTURE: CPT | Performed by: NURSE PRACTITIONER

## 2018-07-19 PROCEDURE — G0463 HOSPITAL OUTPT CLINIC VISIT: HCPCS | Mod: ZF

## 2018-07-19 PROCEDURE — 83880 ASSAY OF NATRIURETIC PEPTIDE: CPT | Performed by: NURSE PRACTITIONER

## 2018-07-19 PROCEDURE — 99215 OFFICE O/P EST HI 40 MIN: CPT | Mod: ZP | Performed by: INTERNAL MEDICINE

## 2018-07-19 ASSESSMENT — PAIN SCALES - GENERAL: PAINLEVEL: NO PAIN (0)

## 2018-07-19 NOTE — PROGRESS NOTES
2018    Bashir Hines MD   [facility]   [address] [place]  [address]       RE: Danielito Chu   MRN: [id number]   : 1955      Dear Dr. Hines:      I had the pleasure of seeing Mr. Danielito Chu in the [facility].    As you know, he is a very pleasant 63-year-old man with a history of nonischemic cardiomyopathy with an ejection fraction at its dong of 15% who was first admitted in 2017 to the [facility] for decompensated heart failure.  Due to refractory volume overload and difficulty diuresing, he was transferred down for consideration of advanced therapies.  Fortunately, he was able to be diuresed over 40 pounds and was placed on medical therapy which he has continued to tolerate.       He did incredibly well for about 7-8 months after this initial discharge and then started to have deterioration again.  We struggled with volume overload as an outpatient for many months and finally he was just admitted about a month ago with another 20 pounds of fluid removed.  His afterload reduction was increased at that time.  Since the time of discharge he has felt considerably better.  He has been going to cardiac rehab again.  He denies PND orthopnea.  His mood is improved.  He has energy to do the things he wants to do.  His weight has been stable he is also lost a few pounds by trying to eat better.     He feels his quality of life is acceptable presently.  If he could feel this good for the next several years he would take it.      PAST MEDICAL HISTORY:  Past Medical History:   Diagnosis Date     Acute systolic congestive heart failure (H) 2017     Diabetes (H)      HTN (hypertension)      Hyperlipidemia      Mitral regurgitation      Nocturnal oxygen desaturation 3/29/2018     Nonischemic cardiomyopathy (H) 2017     SHIRLEY (obstructive sleep apnea)      Pacemaker 2017    ICD 17       FAMILY  HISTORY:  Family History   Problem Relation Age of Onset     Heart Failure Father       at age 86     Heart Failure Paternal Uncle       at 66      Myocardial Infarction Paternal Uncle       at age 62     Coronary Artery Disease Mother       during CABG at age 41       SOCIAL HISTORY:  Social History     Social History     Marital status: Single     Spouse name: N/A     Number of children: N/A     Years of education: N/A     Social History Main Topics     Smoking status: Former Smoker     Smokeless tobacco: None     Alcohol use None     Drug use: None     Sexual activity: Not Asked       CURRENT MEDICATIONS:  Current Outpatient Prescriptions   Medication Sig Dispense Refill     aspirin EC 81 MG EC tablet Take 1 tablet (81 mg) by mouth daily 30 tablet 3     atorvastatin (LIPITOR) 10 MG tablet Take 1 tablet (10 mg) by mouth daily 90 tablet 3     digoxin (LANOXIN) 250 MCG tablet TAKE 1 TABLET (250 MCG) BY MOUTH DAILY 90 tablet 3     metFORMIN (GLUCOPHAGE) 500 MG tablet Take 1 tablet (500 mg) by mouth 2 times daily (with meals)       metoprolol succinate (TOPROL-XL) 25 MG 24 hr tablet Take 0.5 tablets (12.5 mg) by mouth daily 90 tablet 3     potassium chloride SA (K-DUR/KLOR-CON M) 20 MEQ CR tablet Take half a tablet once a day. 90 tablet 3     sacubitril-valsartan (ENTRESTO) 24-26 MG per tablet Take 1 tablet by mouth 2 times daily       sildenafil (VIAGRA) 50 MG tablet Take 1 tablet (50 mg) by mouth daily as needed 20 tablet 1     torsemide (DEMADEX) 20 MG tablet Take 3 tablets (60 mg) by mouth 2 times daily Take torsemide 60 mg in the am and 40 mg in the pm 90 tablet 3     traZODone (DESYREL) 100 MG tablet Take 1 tablet (100 mg) by mouth nightly as needed for sleep 90 tablet 1       ROS:   Constitutional: No fever, chills, or sweats.  Weight is stable at 204 pounds on his home scale  ENT: No visual disturbance, ear ache, epistaxis, sore throat.   Allergies/Immunologic: Negative.   Respiratory: No  "cough, hemoptysis.   Cardiovascular: As per HPI.   GI: No nausea, vomiting, hematemesis, melena, or hematochezia.   : No urinary frequency, dysuria, or hematuria.  + erectile dysfunction   Integument: Negative.   Psychiatric: Mood has been good-no present symptoms of depression  Neuro: Negative.   Endocrinology: Negative.   Musculoskeletal: Negative.    EXAM:  BP (!) 83/57  Pulse 88  Resp 18  Ht 1.753 m (5' 9\")  Wt 90.7 kg (200 lb)  SpO2 100%  BMI 29.53 kg/m2  General: appears comfortable, alert and articulate, breathing is comfortable  Head: normocephalic, atraumatic  Eyes: anicteric sclera, EOMI  Neck: no adenopathy  Orophyarynx: moist mucosa, no lesions, dentition intact  Heart: PMI diffuse,  regular, S1/S2, 2 out of 6 systolic murmur at the left lower sternal border. No gallop, rub,   Lungs: clear, no rales or wheezing  Abdomen: soft, non-tender, bowel sounds present, no hepatosplenomegaly; increased abd fullness  Extremities: no clubbing, cyanosis, no lower extremity edema  Neurological: normal speech and affect, no gross motor deficits    Labs:  CBC RESULTS:  Lab Results   Component Value Date    WBC 7.9 06/15/2018    RBC 5.84 06/15/2018    HGB 17.3 06/15/2018    HCT 53.1 (H) 06/15/2018    MCV 91 06/15/2018    MCH 29.6 06/15/2018    MCHC 32.6 06/15/2018    RDW 18.4 (H) 06/15/2018     06/15/2018       CMP RESULTS:  Lab Results   Component Value Date     07/19/2018    POTASSIUM 4.0 07/19/2018    CHLORIDE 100 07/19/2018    CO2 30 07/19/2018    ANIONGAP 8 07/19/2018    GLC 86 07/19/2018    BUN 34 (H) 07/19/2018    CR 1.29 (H) 07/19/2018    GFRESTIMATED 56 (L) 07/19/2018    GFRESTBLACK 68 07/19/2018    ASHLEE 9.2 07/19/2018    BILITOTAL 3.0 (H) 06/13/2018    ALBUMIN 3.7 06/13/2018    ALKPHOS 95 06/13/2018    ALT 16 06/13/2018    AST 25 06/13/2018        INR RESULTS:  Lab Results   Component Value Date    INR 1.23 (H) 06/15/2018       Lab Results   Component Value Date    MAG 2.5 (H) 06/15/2018 "     Lab Results   Component Value Date    NTBNPI 3382 (H) 06/11/2018     Lab Results   Component Value Date    NTBNP 1407 (H) 07/19/2018              Repeat echocardiogram from June 2017- LV ejection fraction 30%, normal IVC, normal PA pressures, normal valve function no paracardial effusion.     Coronary angiogram and cardiac catheterization on 04/03/2017 right dominant left main, no angiographic evidence of disease.  LAD type-3 vessel with septal perforators and 4 diagonal vessels without angiographic disease.  RCA no angiographically significant disease.        Cardiopulmonary stress test performed in June 2017 shows a peak VO2 of 20 mL/kg/m2.     Last right heart catheterization June 2018-in the setting of decompensation        Assessment and Plan:   In summary, this is a very pleasant, 63-year-old man with a history of nonischemic cardiomyopathy who I have now  been following now for several years.  We have had 2 distinct decompensations.  Presently he is feeling much better than he did prior to this last admission.  He is euvolemic.  His NYHA class III.  He has no further room for afterload reduction and so I will not escalate his neurohormonal blockade today.      We are filing paperwork for cardioMEMs I believe this will help manage his fluid substantially.  He does meet criteria for this as he is class III symptoms and was hospitalized within the last year with ongoing substantial need for diuretic adjustment. We have already changed him to Entresto.     With regard to the larger picture for his heart failure-I am hopeful that with further medical optimization we can buy more time before needing to consider advanced therapies.  He is okay with this plan.  We will need to keep a close eye on him to ensure he is not progressing.  I am reassured by the fact that his NT proBNP has down trended substantially and his creatinine is much improved over the last few months.  This makes me feel that we can wait  further before pursuing advanced options which is his preference as well.     Other-   2. Sudden cardiac death prevention.  He has a single-lead ICD.   3. Primary prevention.  He remains on a statin.   4. Obesity.  Improving with exercise    RTC in October for echo, labs, NP viist       Lorena Watkins MD   of Medicine   [facility] Division of Cardiology         CC  Patient Care Team:  Bashir Hines as PCP - General (Family Practice)  Lorena Watkins MD as MD (Cardiology)  Sparkle Joel, RN as Nurse Coordinator (Cardiology)  Cecelia Palacios, RN as Nurse Coordinator (Cardiology)  Deysi Mattson APRN CNP as Nurse Practitioner (Cardiology)  Kalli Purcell APRN CNP as Nurse Practitioner (Cardiology)  Annie Small, RN as Clinic Care Coordinator (Cardiology)  Jermain Hobbs, ARAVIND as Nurse Coordinator (Cardiology)

## 2018-07-19 NOTE — LETTER
2018      RE: Danielito Chu  [address] [place] [address]       Dear Colleague,    Thank you for the opportunity to participate in the care of your patient, Danielito Chu, at the [facility] HEART [facility] at [facility]. Please see a copy of my visit note below.    2018    Bashir Hines MD   [facility]   [address] [place]  [address]       RE: Danielito Chu   MRN: [id number]   : 1955      Dear Dr. Hines:      I had the pleasure of seeing Mr. Danielito Chu in the [facility] Advanced Heart Failure Clinic.    As you know, he is a very pleasant 63-year-old man with a history of nonischemic cardiomyopathy with an ejection fraction at its dong of 15% who was first admitted in 2017 to the [facility] for decompensated heart failure.  Due to refractory volume overload and difficulty diuresing, he was transferred down for consideration of advanced therapies.  Fortunately, he was able to be diuresed over 40 pounds and was placed on medical therapy which he has continued to tolerate.       He did incredibly well for about 7-8 months after this initial discharge and then started to have deterioration again.  We struggled with volume overload as an outpatient for many months and finally he was just admitted about a month ago with another 20 pounds of fluid removed.  His afterload reduction was increased at that time.  Since the time of discharge he has felt considerably better.  He has been going to cardiac rehab again.  He denies PND orthopnea.  His mood is improved.  He has energy to do the things he wants to do.  His weight has been stable he is also lost a few pounds by trying to eat better.     He feels his quality of life is acceptable presently.  If he could feel this good for the next several years he would take it.      PAST MEDICAL HISTORY:  Past Medical History:   Diagnosis Date     Acute  systolic congestive heart failure (H) 2017     Diabetes (H)      HTN (hypertension)      Hyperlipidemia      Mitral regurgitation      Nocturnal oxygen desaturation 3/29/2018     Nonischemic cardiomyopathy (H) 2017     SHIRLEY (obstructive sleep apnea)      Pacemaker 2017    ICD 17       FAMILY HISTORY:  Family History   Problem Relation Age of Onset     Heart Failure Father       at age 86     Heart Failure Paternal Uncle       at 66      Myocardial Infarction Paternal Uncle       at age 62     Coronary Artery Disease Mother       during CABG at age 41       SOCIAL HISTORY:  Social History     Social History     Marital status: Single     Spouse name: N/A     Number of children: N/A     Years of education: N/A     Social History Main Topics     Smoking status: Former Smoker     Smokeless tobacco: None     Alcohol use None     Drug use: None     Sexual activity: Not Asked       CURRENT MEDICATIONS:  Current Outpatient Prescriptions   Medication Sig Dispense Refill     aspirin EC 81 MG EC tablet Take 1 tablet (81 mg) by mouth daily 30 tablet 3     atorvastatin (LIPITOR) 10 MG tablet Take 1 tablet (10 mg) by mouth daily 90 tablet 3     digoxin (LANOXIN) 250 MCG tablet TAKE 1 TABLET (250 MCG) BY MOUTH DAILY 90 tablet 3     metFORMIN (GLUCOPHAGE) 500 MG tablet Take 1 tablet (500 mg) by mouth 2 times daily (with meals)       metoprolol succinate (TOPROL-XL) 25 MG 24 hr tablet Take 0.5 tablets (12.5 mg) by mouth daily 90 tablet 3     potassium chloride SA (K-DUR/KLOR-CON M) 20 MEQ CR tablet Take half a tablet once a day. 90 tablet 3     sacubitril-valsartan (ENTRESTO) 24-26 MG per tablet Take 1 tablet by mouth 2 times daily       sildenafil (VIAGRA) 50 MG tablet Take 1 tablet (50 mg) by mouth daily as needed 20 tablet 1     torsemide (DEMADEX) 20 MG tablet Take 3 tablets (60 mg) by mouth 2 times daily Take torsemide 60 mg in the am and 40 mg in the pm 90 tablet 3     traZODone (DESYREL) 100  "MG tablet Take 1 tablet (100 mg) by mouth nightly as needed for sleep 90 tablet 1       ROS:   Constitutional: No fever, chills, or sweats.  Weight is stable at 204 pounds on his home scale  ENT: No visual disturbance, ear ache, epistaxis, sore throat.   Allergies/Immunologic: Negative.   Respiratory: No cough, hemoptysis.   Cardiovascular: As per HPI.   GI: No nausea, vomiting, hematemesis, melena, or hematochezia.   : No urinary frequency, dysuria, or hematuria.  + erectile dysfunction   Integument: Negative.   Psychiatric: Mood has been good-no present symptoms of depression  Neuro: Negative.   Endocrinology: Negative.   Musculoskeletal: Negative.    EXAM:  BP (!) 83/57  Pulse 88  Resp 18  Ht 1.753 m (5' 9\")  Wt 90.7 kg (200 lb)  SpO2 100%  BMI 29.53 kg/m2  General: appears comfortable, alert and articulate, breathing is comfortable  Head: normocephalic, atraumatic  Eyes: anicteric sclera, EOMI  Neck: no adenopathy  Orophyarynx: moist mucosa, no lesions, dentition intact  Heart: PMI diffuse,  regular, S1/S2, 2 out of 6 systolic murmur at the left lower sternal border. No gallop, rub,   Lungs: clear, no rales or wheezing  Abdomen: soft, non-tender, bowel sounds present, no hepatosplenomegaly; increased abd fullness  Extremities: no clubbing, cyanosis, no lower extremity edema  Neurological: normal speech and affect, no gross motor deficits    Labs:  CBC RESULTS:  Lab Results   Component Value Date    WBC 7.9 06/15/2018    RBC 5.84 06/15/2018    HGB 17.3 06/15/2018    HCT 53.1 (H) 06/15/2018    MCV 91 06/15/2018    MCH 29.6 06/15/2018    MCHC 32.6 06/15/2018    RDW 18.4 (H) 06/15/2018     06/15/2018       CMP RESULTS:  Lab Results   Component Value Date     07/19/2018    POTASSIUM 4.0 07/19/2018    CHLORIDE 100 07/19/2018    CO2 30 07/19/2018    ANIONGAP 8 07/19/2018    GLC 86 07/19/2018    BUN 34 (H) 07/19/2018    CR 1.29 (H) 07/19/2018    GFRESTIMATED 56 (L) 07/19/2018    GFRESTBLACK 68 " 07/19/2018    ASHLEE 9.2 07/19/2018    BILITOTAL 3.0 (H) 06/13/2018    ALBUMIN 3.7 06/13/2018    ALKPHOS 95 06/13/2018    ALT 16 06/13/2018    AST 25 06/13/2018        INR RESULTS:  Lab Results   Component Value Date    INR 1.23 (H) 06/15/2018       Lab Results   Component Value Date    MAG 2.5 (H) 06/15/2018     Lab Results   Component Value Date    NTBNPI 3382 (H) 06/11/2018     Lab Results   Component Value Date    NTBNP 1407 (H) 07/19/2018              Repeat echocardiogram from June 2017- LV ejection fraction 30%, normal IVC, normal PA pressures, normal valve function no paracardial effusion.     Coronary angiogram and cardiac catheterization on 04/03/2017 right dominant left main, no angiographic evidence of disease.  LAD type-3 vessel with septal perforators and 4 diagonal vessels without angiographic disease.  RCA no angiographically significant disease.        Cardiopulmonary stress test performed in June 2017 shows a peak VO2 of 20 mL/kg/m2.     Last right heart catheterization June 2018-in the setting of decompensation        Assessment and Plan:   In summary, this is a very pleasant, 63-year-old man with a history of nonischemic cardiomyopathy who I have now  been following now for several years.  We have had 2 distinct decompensations.  Presently he is feeling much better than he did prior to this last admission.  He is euvolemic.  His NYHA class III.  He has no further room for afterload reduction and so I will not escalate his neurohormonal blockade today.      We are filing paperwork for cardioMEMs I believe this will help manage his fluid substantially.  He does meet criteria for this as he is class III symptoms and was hospitalized within the last year with ongoing substantial need for diuretic adjustment. We have already changed him to Entresto.     With regard to the larger picture for his heart failure-I am hopeful that with further medical optimization we can buy more time before needing to  consider advanced therapies.  He is okay with this plan.  We will need to keep a close eye on him to ensure he is not progressing.  I am reassured by the fact that his NT proBNP has down trended substantially and his creatinine is much improved over the last few months.  This makes me feel that we can wait further before pursuing advanced options which is his preference as well.     Other-   2. Sudden cardiac death prevention.  He has a single-lead ICD.   3. Primary prevention.  He remains on a statin.   4. Obesity.  Improving with exercise    RTC in October for echo, labs, NP viist       Lorena Watkins MD   of Medicine   [facility] Division of Cardiology       CC  Patient Care Team:  Bashir Hines as PCP - General (Family Practice)  Lorena Watkins MD as MD (Cardiology)  Sparkle Joel, RN as Nurse Coordinator (Cardiology)  Cecelia Palacios, RN as Nurse Coordinator (Cardiology)  Deysi Mattson APRN CNP as Nurse Practitioner (Cardiology)  Kalli Purcell APRN CNP as Nurse Practitioner (Cardiology)  Annie Small RN as Clinic Care Coordinator (Cardiology)  Jermain Hobbs, ARAVIND as Nurse Coordinator (Cardiology)

## 2018-07-19 NOTE — NURSING NOTE
Diet: Patient instructed regarding a heart healthy diet, including discussion of reduced fat and sodium intake. Patient demonstrated understanding of this information and agreed to call with further questions or concerns.  Labs: Patient was given results of the laboratory testing obtained today. Patient demonstrated understanding of this information and agreed to call with further questions or concerns.   Med Reconcile: Reviewed and verified all current medications with the patient. The updated medication list was printed and given to the patient.  Return Appointment: Patient given instructions regarding scheduling next clinic visit. Patient demonstrated understanding of this information and agreed to call with further questions or concerns.    Patient stated he understood all health information given and agreed to call with further questions or concerns.

## 2018-07-19 NOTE — MR AVS SNAPSHOT
After Visit Summary   7/19/2018    Danielito Chu    MRN: [id number]           Patient Information     Date Of Birth          1955        Visit Information        Provider Department      7/19/2018 3:00 PM Lorena Watkins MD [facility]        Today's Diagnoses     Chronic systolic heart failure (H)    -  1    Acute systolic congestive heart failure (H)          Care Instructions    Cardiology Providers you saw during your visit:  Dr. Watkins    Medication changes:  No medication changes    Follow up:  1- Follow up with CORE clinic with ECHO and labs prior on October 2nd or October 16th 2018    2-Please return to clinic for follow-up:  With Dr. Watkins in 4-5 months      Please call if you have :  1. Weight gain of more than 2 pounds in a day or 5 pounds in a week  2. Increased shortness of breath, swelling or bloating  3. Dizziness, lightheadedness   4. Any questions or concerns.       Follow the American Heart Association Diet and Lifestyle recommendations:  Limit saturated fat, trans fat, sodium, red meat, sweets and sugar-sweetened beverages. If you choose to eat red meat, compare labels and select the leanest cuts available.  Aim for at least 150 minutes of moderate physical activity or 75 minutes of vigorous physical activity - or an equal combination of both - each week.      During business hours: [phone], press option # 1 to be routed to the [facility] then option # 3 for medical questions to speak with a nurse      After hours, weekends or holidays: On Call Cardiologist- [phone]   option #4 and ask to speak to the on-call Cardiologist. Inform them you are a CORE/heart failure patient at the [facility].      Jermain Hobbs RN  Cardiology Nurse Care Coordinator    Keep up the good work!    Take Care!                Follow-ups after your visit        Additional Services     Follow-Up with CORE Clinic       With labs and ECHO prior            Follow-Up with  "Advanced Heart Failure Clinic                 Your next 10 appointments already scheduled     Jan 21, 2019  1:00 PM CST   (Arrive by 12:45 PM)   Implanted Defibulator with Uc Cv Device 1   [facility] ([facility])    [address] [address] [phone]              Future tests that were ordered for you today     Open Future Orders        Priority Expected Expires Ordered    Follow-Up with Advanced Heart Failure Clinic Routine 12/19/2018 7/19/2019 7/19/2018    Follow-Up with CORE Clinic Routine 10/2/2018 7/19/2019 7/19/2018    Echocardiogram Routine 10/2/2018 7/19/2019 7/19/2018    Basic metabolic panel Routine 12/19/2018 7/19/2019 7/19/2018    N terminal pro BNP outpatient Routine 12/19/2018 7/19/2019 7/19/2018    Follow-Up with Device Clinic-6 months Routine 1/15/2019 4/15/2019 7/19/2018            Who to contact     If you have questions or need follow up information about today's clinic visit or your schedule please contact [facility] directly at [phone].  Normal or non-critical lab and imaging results will be communicated to you by MyChart, letter or phone within 4 business days after the clinic has received the results. If you do not hear from us within 7 days, please contact the clinic through MyChart or phone. If you have a critical or abnormal lab result, we will notify you by phone as soon as possible.  Submit refill requests through [employer] or call your pharmacy and they will forward the refill request to us. Please allow 3 business days for your refill to be completed.          Additional Information About Your Visit        Care EveryWhere ID     This is your Care EveryWhere ID. This could be used by other organizations to access your [place] medical records  [id number]        Your Vitals Were     Pulse Respirations Height Pulse Oximetry BMI (Body Mass Index)       88 18 1.753 m (5' 9\") 100% 29.53 kg/m2        Blood " Pressure from Last 3 Encounters:   07/19/18 (!) 83/57   07/03/18 90/52   06/25/18 92/60    Weight from Last 3 Encounters:   07/19/18 90.7 kg (200 lb)   07/03/18 92.9 kg (204 lb 12.8 oz)   06/25/18 96.6 kg (213 lb)              We Performed the Following     Follow-Up with Advanced Heart Failure Cardiologist        Primary Care Provider Office Phone # Fax #    Bashir Hines [phone]       [facility] [address] [address]        Equal Access to Services     JONELLE RODGERS : Hadii andres Tsai, waleatha pena, sandip kaalmacarlos alberto leary, aaron nelson . [facility] [phone].    ATENCIÓN: Si habla español, tiene a stokes disposición servicios gratuitos de asistencia lingüística. [facility] [phone].    We comply with applicable federal civil rights laws and [place] laws. We do not discriminate on the basis of race, color, national origin, age, disability, sex, sexual orientation, or gender identity.            Thank you!     Thank you for choosing [facility]  for your care. Our goal is always to provide you with excellent care. Hearing back from our patients is one way we can continue to improve our services. Please take a few minutes to complete the written survey that you may receive in the mail after your visit with us. Thank you!             Your Updated Medication List - Protect others around you: Learn how to safely use, store and throw away your medicines at www.disposemymeds.org.          This list is accurate as of 7/19/18 11:59 PM.  Always use your most recent med list.                   Brand Name Dispense Instructions for use Diagnosis    aspirin 81 MG EC tablet     30 tablet    Take 1 tablet (81 mg) by mouth daily    Acute systolic congestive heart failure (H)       atorvastatin 10 MG tablet    LIPITOR    90 tablet    Take 1 tablet (10 mg) by mouth daily    Acute systolic congestive heart failure (H)       digoxin 250 MCG  tablet    LANOXIN    90 tablet    TAKE 1 TABLET (250 MCG) BY MOUTH DAILY    Acute on chronic systolic heart failure (H)       metFORMIN 500 MG tablet    GLUCOPHAGE     Take 1 tablet (500 mg) by mouth 2 times daily (with meals)    Chronic systolic heart failure (H), Acute systolic congestive heart failure (H)       metoprolol succinate 25 MG 24 hr tablet    TOPROL-XL    90 tablet    Take 0.5 tablets (12.5 mg) by mouth daily    Acute on chronic systolic heart failure (H)       potassium chloride SA 20 MEQ CR tablet    K-DUR/KLOR-CON M    90 tablet    Take half a tablet once a day.    Acute systolic congestive heart failure (H)       sacubitril-valsartan 24-26 MG per tablet    ENTRESTO     Take 1 tablet by mouth 2 times daily        sildenafil 50 MG tablet    VIAGRA    20 tablet    Take 1 tablet (50 mg) by mouth daily as needed    Drug-induced impotence       torsemide 20 MG tablet    DEMADEX    90 tablet    Take 3 tablets (60 mg) by mouth 2 times daily Take torsemide 60 mg in the am and 40 mg in the pm    Acute systolic congestive heart failure (H)       traZODone 100 MG tablet    DESYREL    90 tablet    Take 1 tablet (100 mg) by mouth nightly as needed for sleep    Insomnia, unspecified type

## 2018-07-19 NOTE — PATIENT INSTRUCTIONS
Cardiology Providers you saw during your visit:  Dr. Watkins    Medication changes:  No medication changes    Follow up:  1- Follow up with CORE clinic with ECHO and labs prior on October 2nd or October 16th 2018    2-Please return to clinic for follow-up:  With Dr. Watkins in 4-5 months      Please call if you have :  1. Weight gain of more than 2 pounds in a day or 5 pounds in a week  2. Increased shortness of breath, swelling or bloating  3. Dizziness, lightheadedness   4. Any questions or concerns.       Follow the American Heart Association Diet and Lifestyle recommendations:  Limit saturated fat, trans fat, sodium, red meat, sweets and sugar-sweetened beverages. If you choose to eat red meat, compare labels and select the leanest cuts available.  Aim for at least 150 minutes of moderate physical activity or 75 minutes of vigorous physical activity - or an equal combination of both - each week.      During business hours: [phone], press option # 1 to be routed to the [facility] then option # 3 for medical questions to speak with a nurse      After hours, weekends or holidays: On Call Cardiologist- [phone]   option #4 and ask to speak to the on-call Cardiologist. Inform them you are a CORE/heart failure patient at the [facility].      Jermain Hobbs RN  Cardiology Nurse Care Coordinator    Keep up the good work!    Take Care!

## 2018-07-19 NOTE — PATIENT INSTRUCTIONS
It was a pleasure to see you in clinic today. Please do not hesitate to call with any questions or concerns. You are scheduled for a remote ICD transmission on 10/23/18. This will happen automatically in the night. We will call in 1-2 business days to discuss the results with you. We look forward to seeing you in clinic at your next device check in 6 months.    Justina Benson RN  Electrophysiology Nurse Clinician  [facility]  During business hours call:  [phone]  After business hours please call: [phone]- select option #4 and ask for job code [id number].

## 2018-07-19 NOTE — MR AVS SNAPSHOT
After Visit Summary   7/19/2018    Danielito Chu    MRN: [id number]           Patient Information     Date Of Birth          1955        Visit Information        Provider Department      7/19/2018 2:30 PM 1, Uc Cv Device [facility]        Today's Diagnoses     Nonischemic cardiomyopathy (H)          Care Instructions    It was a pleasure to see you in clinic today. Please do not hesitate to call with any questions or concerns. You are scheduled for a remote ICD transmission on 10/23/18. This will happen automatically in the night. We will call in 1-2 business days to discuss the results with you. We look forward to seeing you in clinic at your next device check in 6 months.    Justina Benson RN  Electrophysiology Nurse Clinician  [facility]  During business hours call:  [phone]  After business hours please call: [phone]- select option #4 and ask for job code [phone].            Follow-ups after your visit        Additional Services     Follow-Up with Device Clinic-6 months                 Your next 10 appointments already scheduled     Jan 21, 2019  1:00 PM CST   (Arrive by 12:45 PM)   Implanted Defibulator with Uc Cv Device 1   [facility] ([facility])    [address] [place] [id number] [phone]              Future tests that were ordered for you today     Open Future Orders        Priority Expected Expires Ordered    Follow-Up with Advanced Heart Failure Clinic Routine 12/19/2018 7/19/2019 7/19/2018    Follow-Up with [facility] Routine 10/2/2018 7/19/2019 7/19/2018    Echocardiogram Routine 10/2/2018 7/19/2019 7/19/2018    Basic metabolic panel Routine 12/19/2018 7/19/2019 7/19/2018    N terminal pro BNP outpatient Routine 12/19/2018 7/19/2019 7/19/2018    Follow-Up with Device Clinic-6 months Routine 1/15/2019 4/15/2019 7/19/2018            Who to contact     If you have  questions or need follow up information about today's clinic visit or your schedule please contact [facility] directly at [phone].  Normal or non-critical lab and imaging results will be communicated to you by MyChart, letter or phone within 4 business days after the clinic has received the results. If you do not hear from us within 7 days, please contact the clinic through MyChart or phone. If you have a critical or abnormal lab result, we will notify you by phone as soon as possible.  Submit refill requests through [employer] or call your pharmacy and they will forward the refill request to us. Please allow 3 business days for your refill to be completed.          Additional Information About Your Visit        Care EveryWhere ID     This is your Care EveryWhere ID. This could be used by other organizations to access your [facility] medical records  [id number]         Blood Pressure from Last 3 Encounters:   07/19/18 (!) 83/57   07/03/18 90/52   06/25/18 92/60    Weight from Last 3 Encounters:   07/19/18 90.7 kg (200 lb)   07/03/18 92.9 kg (204 lb 12.8 oz)   06/25/18 96.6 kg (213 lb)              We Performed the Following     (41330) ICD DEVICE PROGRAMMING EVAL, SINGLE LEAD ICD     Follow-Up with Device Clinic-6 months        Primary Care Provider Office Phone # Fax #    Bashir Hines [phone]       [place] [address]        Equal Access to Services     ALEX RODGERS : Hadii aad ku hadasho Soomaali, waaxda luqadaha, qaybta kaalmada adeegyada, aaron pearson. So [facility] [phone].    ATENCIÓN: Si habla español, tiene a stokes disposición servicios gratuitos de asistencia lingüística. Llame al [phone].    We comply with applicable federal civil rights laws and [place] laws. We do not discriminate on the basis of race, color, national origin, age, disability, sex, sexual orientation, or gender identity.            Thank you!      Thank you for choosing [facility]  for your care. Our goal is always to provide you with excellent care. Hearing back from our patients is one way we can continue to improve our services. Please take a few minutes to complete the written survey that you may receive in the mail after your visit with us. Thank you!             Your Updated Medication List - Protect others around you: Learn how to safely use, store and throw away your medicines at www.disposemymeds.org.          This list is accurate as of 7/19/18 11:59 PM.  Always use your most recent med list.                   Brand Name Dispense Instructions for use Diagnosis    aspirin 81 MG EC tablet     30 tablet    Take 1 tablet (81 mg) by mouth daily    Acute systolic congestive heart failure (H)       atorvastatin 10 MG tablet    LIPITOR    90 tablet    Take 1 tablet (10 mg) by mouth daily    Acute systolic congestive heart failure (H)       digoxin 250 MCG tablet    LANOXIN    90 tablet    TAKE 1 TABLET (250 MCG) BY MOUTH DAILY    Acute on chronic systolic heart failure (H)       metFORMIN 500 MG tablet    GLUCOPHAGE     Take 1 tablet (500 mg) by mouth 2 times daily (with meals)    Chronic systolic heart failure (H), Acute systolic congestive heart failure (H)       metoprolol succinate 25 MG 24 hr tablet    TOPROL-XL    90 tablet    Take 0.5 tablets (12.5 mg) by mouth daily    Acute on chronic systolic heart failure (H)       potassium chloride SA 20 MEQ CR tablet    K-DUR/KLOR-CON M    90 tablet    Take half a tablet once a day.    Acute systolic congestive heart failure (H)       sacubitril-valsartan 24-26 MG per tablet    ENTRESTO     Take 1 tablet by mouth 2 times daily        sildenafil 50 MG tablet    VIAGRA    20 tablet    Take 1 tablet (50 mg) by mouth daily as needed    Drug-induced impotence       torsemide 20 MG tablet    DEMADEX    90 tablet    Take 3 tablets (60 mg) by mouth 2 times daily Take torsemide 60 mg in the am and 40 mg in the  pm    Acute systolic congestive heart failure (H)       traZODone 100 MG tablet    DESYREL    90 tablet    Take 1 tablet (100 mg) by mouth nightly as needed for sleep    Insomnia, unspecified type

## 2018-07-19 NOTE — NURSING NOTE
Chief Complaint   Patient presents with     RECHECK      Chronic systolic heart failure 2/2 NICM     Theodore Ac CMA at 2:39 PM on 7/19/2018     Medications and vitals reviewed with patient and confirmed.

## 2018-07-19 NOTE — PROGRESS NOTES
Preliminary Device Interrogation Results.  Final physician signed paceart report to be scanned and attached.    Pt seen in the [facility] for evaluation and iterative programming of a [employer], single lead ICD, per MD orders. He also has an appointment with Dr. Watkins. Today his intrinsic rhythm is a regular ventricular rhythm @ 93 bpm. Normal ICD function. 19 NSVT episodes recorded over the last 5 months. Log book reports episodes lasting 12-16 seconds. EGMs show 3-4 seconds with rates 163- 184 bpm. = 0%. Lead trends appear stable. Pt reports that he is feeling well. Battery estimates 12 years to LINCOLN. Plan for pt to send a remote transmission on 10/23/18 and RTC in 6 months.  Single lead ICD

## 2018-07-23 ENCOUNTER — TELEPHONE (OUTPATIENT)
Dept: CARDIOLOGY | Facility: CLINIC | Age: 63
End: 2018-07-23

## 2018-07-23 NOTE — TELEPHONE ENCOUNTER
[facility] Call Center    Phone Message    May a detailed message be left on voicemail: yes    Reason for Call: Other: Pt wanted to let Dr. Watkins know that he rescheduled his echo for August so he'll be able to afford it. He wants to know if this is okay and also if she wants to see him after to go over the results. Please call pt back to discuss.     Action Taken: Message routed to:  [facility]

## 2018-07-24 NOTE — TELEPHONE ENCOUNTER
Called patient back, he has met his deductible, it kicks back in in September. Ok to have ECHO done in August, already scheduled.

## 2018-07-24 NOTE — TELEPHONE ENCOUNTER
Returned patient call to let him know that he only needs appointments in October for lab, device, ECHO and clinic visit with [facility]. He doesn't need appointments in August.  Got patient's voicemail which is not set up to receive messages; will try to call patient later today.

## 2018-08-30 NOTE — PROGRESS NOTES
Update: 8/30/18 - Patients insurance termed 7/31/2018. Unable to process PA until patient acquires new insurance.

## 2018-09-11 NOTE — PROGRESS NOTES
Just tallked to Danielito today and insurance should be ready to go in a week. He just sent his cobra check in this Monday.  I emailed Frieda so they can start the process again.  Brenden Murdock, [facility] Heart Failure Admin/Referrals

## 2018-09-24 ENCOUNTER — TELEPHONE (OUTPATIENT)
Dept: CARDIOLOGY | Facility: CLINIC | Age: 63
End: 2018-09-24

## 2018-09-24 NOTE — TELEPHONE ENCOUNTER
[facility] Call Center    Phone Message    May a detailed message be left on voicemail: yes    Reason for Call: Other: Pt added his insurance into Epic - wants Prior Auth started for his upcoming procedures and tests please - Thanks     Action Taken: Message routed to:  [facility] ([facility]): Cardiology Clinic

## 2018-09-26 NOTE — TELEPHONE ENCOUNTER
Called our PA team and got it going. Thank you.    Brenden Murdock, [facility] Heart Failure Admin/Referrals

## 2018-10-01 DIAGNOSIS — G47.00 INSOMNIA, UNSPECIFIED TYPE: ICD-10-CM

## 2018-10-01 RX ORDER — TRAZODONE HYDROCHLORIDE 100 MG/1
TABLET ORAL
Qty: 30 TABLET | Refills: 1 | Status: ON HOLD | OUTPATIENT
Start: 2018-10-01 | End: 2018-12-17

## 2018-10-09 DIAGNOSIS — I50.21 ACUTE SYSTOLIC CONGESTIVE HEART FAILURE (H): Primary | ICD-10-CM

## 2018-10-15 ENCOUNTER — CARE COORDINATION (OUTPATIENT)
Dept: CARDIOLOGY | Facility: CLINIC | Age: 63
End: 2018-10-15

## 2018-10-15 NOTE — PROGRESS NOTES
"Pt called [facility] stating he wanted to talk to a RN about not feeling well. Called pt. He reports not sleeping well. Denied SOB and BAJWA. Pt is at dry wt of 202 lbs. Pt states his O2 sats are at 94% while sleeping. He is able to lie flat to sleep but out of preference uses a wedge pillow. Reports he has a \"funny feeling\" that something isn't right. He will sit up in a chair for most of the night hoping to sleep. Pt reports an increase of personal stress. Is very worried about insurance and income at this time. States he really misses his SO who is in [place] for work. Message routed to provider for review. [place] Bravo RN [facility] Care Coordinator     Spoke to provider. No emergent concerns at this time. Will discuss sleep issues during appt tomorrow. Pt should go to local ED if symptoms appear or get worse. Called pt, reviewed message. Pt stated, \"Ok, yeah, I just want to get a good nights sleep soon. I will see you tomorrow.\" Pt had no further questions. [place]ia Bravo RN [facility] Care Coordinator     "

## 2018-10-16 ENCOUNTER — OFFICE VISIT (OUTPATIENT)
Dept: CARDIOLOGY | Facility: CLINIC | Age: 63
End: 2018-10-16
Payer: COMMERCIAL

## 2018-10-16 ENCOUNTER — OFFICE VISIT (OUTPATIENT)
Dept: CARDIOLOGY | Facility: CLINIC | Age: 63
End: 2018-10-16
Attending: NURSE PRACTITIONER
Payer: COMMERCIAL

## 2018-10-16 ENCOUNTER — ALLIED HEALTH/NURSE VISIT (OUTPATIENT)
Dept: CARDIOLOGY | Facility: CLINIC | Age: 63
End: 2018-10-16
Attending: INTERNAL MEDICINE
Payer: COMMERCIAL

## 2018-10-16 ENCOUNTER — RESEARCH ENCOUNTER (OUTPATIENT)
Dept: CARDIOLOGY | Facility: CLINIC | Age: 63
End: 2018-10-16

## 2018-10-16 VITALS
SYSTOLIC BLOOD PRESSURE: 105 MMHG | HEIGHT: 69 IN | DIASTOLIC BLOOD PRESSURE: 70 MMHG | WEIGHT: 202 LBS | HEART RATE: 76 BPM | BODY MASS INDEX: 29.92 KG/M2 | OXYGEN SATURATION: 98 %

## 2018-10-16 DIAGNOSIS — I42.8 NONISCHEMIC CARDIOMYOPATHY (H): Primary | ICD-10-CM

## 2018-10-16 DIAGNOSIS — I50.22 CHRONIC SYSTOLIC HEART FAILURE (H): ICD-10-CM

## 2018-10-16 DIAGNOSIS — I50.23 ACUTE ON CHRONIC SYSTOLIC HEART FAILURE (H): ICD-10-CM

## 2018-10-16 DIAGNOSIS — I50.21 ACUTE SYSTOLIC CONGESTIVE HEART FAILURE (H): ICD-10-CM

## 2018-10-16 LAB
ANION GAP SERPL CALCULATED.3IONS-SCNC: 6 MMOL/L (ref 3–14)
BUN SERPL-MCNC: 32 MG/DL (ref 7–30)
CALCIUM SERPL-MCNC: 9.2 MG/DL (ref 8.5–10.1)
CHLORIDE SERPL-SCNC: 100 MMOL/L (ref 94–109)
CO2 SERPL-SCNC: 30 MMOL/L (ref 20–32)
CREAT SERPL-MCNC: 1.51 MG/DL (ref 0.66–1.25)
GFR SERPL CREATININE-BSD FRML MDRD: 47 ML/MIN/1.7M2
GLUCOSE SERPL-MCNC: 126 MG/DL (ref 70–99)
POTASSIUM SERPL-SCNC: 4.3 MMOL/L (ref 3.4–5.3)
SODIUM SERPL-SCNC: 136 MMOL/L (ref 133–144)

## 2018-10-16 PROCEDURE — 36415 COLL VENOUS BLD VENIPUNCTURE: CPT | Performed by: NURSE PRACTITIONER

## 2018-10-16 PROCEDURE — 99214 OFFICE O/P EST MOD 30 MIN: CPT | Mod: ZP | Performed by: NURSE PRACTITIONER

## 2018-10-16 PROCEDURE — 80048 BASIC METABOLIC PNL TOTAL CA: CPT | Performed by: NURSE PRACTITIONER

## 2018-10-16 PROCEDURE — 99207 ZZC NO CHG PAT LEFT NOT BEING SEEN: CPT | Mod: ZP | Performed by: NURSE PRACTITIONER

## 2018-10-16 PROCEDURE — G0463 HOSPITAL OUTPT CLINIC VISIT: HCPCS | Mod: 25,ZF

## 2018-10-16 RX ORDER — METOPROLOL SUCCINATE 25 MG/1
25 TABLET, EXTENDED RELEASE ORAL DAILY
Qty: 90 TABLET | Refills: 3 | Status: ON HOLD | OUTPATIENT
Start: 2018-10-16 | End: 2018-10-29

## 2018-10-16 ASSESSMENT — PAIN SCALES - GENERAL: PAINLEVEL: NO PAIN (0)

## 2018-10-16 NOTE — PROGRESS NOTES
"Advanced Heart Failure Clinic -- [facility] Follow Up  October 16, 2018    HPI:   Mr. Danielito Chu is a 63yr old male with a history of NICM (LVEF at dong of 15%) s/p ICD (4/2017), HTN, SHIRLEY, and HL who presents to [facility] clinic for follow up.    He states that he feels like he is \"in CHF\" for the last 3-4 days.  He cannot sleep, and notes that his breathing is \"different.\"  He sleeps with his baseline of a couple pilllows, and denies PND and orthopnea, but notes that he is SOB when rising from a lying position.  He reports that he has central sleep apnea, and uses oxygen but cannot tolerate CPAP.  He is now tired during the day, as he is not sleeping at night.  He is not very active, and states that his activity is limited by his legs.  He notes that he feels better talking to us in clinic today, and states that his breathing is markedly improved after talking with compassionate people here.  He does not take torsemide daily.  Rather, he weighs himself a few times a day, and takes torsemide 60mg when his weight is above 198#.  He said he typically takes one dose a day, only 3-4 days each week.  He does supplement each torsemide dose with one dose of potassium.  He uses his weight as his indicator for fluid retention, as he states that he does not feel bloated or LE edema.  He does not believe that he is depressed, but notes that he is bored and does not have a typical daily routine.  He notes that his significant other has moved to [place], and he plans to visit her in November.  He otherwise denies chest pain, palpitations, dizziness, headaches, weakness, fevers, chills, nausea, vomiting, diarrhea, and signs of bleeding.    PAST MEDICAL HISTORY:  Past Medical History:   Diagnosis Date     Acute systolic congestive heart failure (H) 4/5/2017     Diabetes (H)      HTN (hypertension)      Hyperlipidemia      Mitral regurgitation      Nocturnal oxygen desaturation 3/29/2018     Nonischemic cardiomyopathy (H) 4/5/2017     SHIRLEY " (obstructive sleep apnea)      Pacemaker 2017    ICD 17       FAMILY HISTORY:  Family History   Problem Relation Age of Onset     Heart Failure Father       at age 86     Heart Failure Paternal Uncle       at 66      Myocardial Infarction Paternal Uncle       at age 62     Coronary Artery Disease Mother       during CABG at age 41       SOCIAL HISTORY:  Social History     Social History     Marital status: Single     Spouse name: N/A     Number of children: N/A     Years of education: N/A     Social History Main Topics     Smoking status: Former Smoker     Smokeless tobacco: Never Used      Comment: quit 3/2017     Alcohol use None      Comment: social     Drug use: None     Sexual activity: Not Asked     Other Topics Concern     None     Social History Narrative       CURRENT MEDICATIONS:  Current Outpatient Prescriptions   Medication Sig Dispense Refill     aspirin EC 81 MG EC tablet Take 1 tablet (81 mg) by mouth daily 30 tablet 3     atorvastatin (LIPITOR) 10 MG tablet Take 1 tablet (10 mg) by mouth daily 90 tablet 3     digoxin (LANOXIN) 250 MCG tablet TAKE 1 TABLET (250 MCG) BY MOUTH DAILY 90 tablet 3     metoprolol succinate (TOPROL-XL) 25 MG 24 hr tablet Take 0.5 tablets (12.5 mg) by mouth daily 90 tablet 3     potassium chloride SA (K-DUR/KLOR-CON M) 20 MEQ CR tablet Take half a tablet once a day. 90 tablet 3     sacubitril-valsartan (ENTRESTO) 24-26 MG per tablet Take 1 tablet by mouth 2 times daily       torsemide (DEMADEX) 20 MG tablet Take 3 tablets (60 mg) by mouth 2 times daily Take torsemide 60 mg in the am and 40 mg in the pm 90 tablet 3     traZODone (DESYREL) 100 MG tablet TAKE 1 TABLET BY MOUTH AT BEDTIME AS NEEDED FOR SLEEP 30 tablet 1     metFORMIN (GLUCOPHAGE) 500 MG tablet Take 1 tablet (500 mg) by mouth 2 times daily (with meals)       sildenafil (VIAGRA) 50 MG tablet Take 1 tablet (50 mg) by mouth daily as needed (Patient not taking: Reported on 10/16/2018) 20  "tablet 1       ROS:   Constitutional: No fever, chills, or sweats. Stable weight.   ENT: No visual disturbance, ear ache, epistaxis, sore throat.   Allergies/Immunologic: Negative.   Respiratory: No cough, hemoptysis.   Cardiovascular: As per HPI.   GI: No nausea, vomiting, hematemesis, melena, or hematochezia.   : No urinary frequency, dysuria, or hematuria.   Integument: Negative.   Psychiatric: As per HPI.  Neuro: Negative.   Endocrinology: Negative.   Musculoskeletal: As per HPI.    EXAM:  /70 (BP Location: Right arm, Patient Position: Chair, Cuff Size: Adult Large)  Pulse 76  Ht 1.753 m (5' 9\")  Wt 91.6 kg (202 lb)  SpO2 98%  BMI 29.83 kg/m2  General: appears comfortable, alert and articulate  Head: normocephalic, atraumatic  Eyes: anicteric sclera, EOMI  Neck: no adenopathy  Orophyarynx: moist mucosa, no lesions, dentition intact  Heart: regular, S1/S2, grade 2/6 JENNY best heard at LLSB, no gallop or rub, JVD at jaw when sitting upright  Lungs: clear, no rales or wheezing  Abdomen: soft, non-tender, bowel sounds present, no hepatosplenomegaly  Extremities: no clubbing, cyanosis or LE edema  Neurological: normal speech and affect, no gross motor deficits    Labs:  CBC RESULTS:  Lab Results   Component Value Date    WBC 7.9 06/15/2018    RBC 5.84 06/15/2018    HGB 17.3 06/15/2018    HCT 53.1 (H) 06/15/2018    MCV 91 06/15/2018    MCH 29.6 06/15/2018    MCHC 32.6 06/15/2018    RDW 18.4 (H) 06/15/2018     06/15/2018       CMP RESULTS:  Lab Results   Component Value Date     10/16/2018    POTASSIUM 4.3 10/16/2018    CHLORIDE 100 10/16/2018    CO2 30 10/16/2018    ANIONGAP 6 10/16/2018     (H) 10/16/2018    BUN 32 (H) 10/16/2018    CR 1.51 (H) 10/16/2018    GFRESTIMATED 47 (L) 10/16/2018    GFRESTBLACK 57 (L) 10/16/2018    ASHLEE 9.2 10/16/2018    BILITOTAL 3.0 (H) 06/13/2018    ALBUMIN 3.7 06/13/2018    ALKPHOS 95 06/13/2018    ALT 16 06/13/2018    AST 25 06/13/2018        INR " RESULTS:  Lab Results   Component Value Date    INR 1.23 (H) 06/15/2018       Lab Results   Component Value Date    MAG 2.5 (H) 06/15/2018     Lab Results   Component Value Date    NTBNPI 3382 (H) 06/11/2018     Lab Results   Component Value Date    NTBNP 1407 (H) 07/19/2018     Diagnostics:  Reviewed in EPIC.      Assessment and Plan:   Mr. Danielito Chu is a 63yr old male with a history of NICM (LVEF at dong of 15%) s/p ICD (4/2017), HTN, SHIRLEY, and HL who presents to [facility] clinic for follow up.    Labs today show stable electrolytes.  Creatinine slightly elevated, likely due to hypervolemia.    Discussed that even though his weight appears to be within his EDW (~200#), he appears hypervolemic.  He states that he has been dosing his torsemide according to this EDW since his last hospitalization.  I presume that he has lost muscle mass, as he is now not working nor engaging in regular activity, and that he is indeed volume up in spite of this weight.  Discussed that he should take torsemide 60mg this rohini and start 60mg BID tomorrow until his weight gets to ~195#.    Will also increase his metoprolol XL to 25mg daily.  Asked that he return to clinic in 2-4 weeks, with an echo prior.    Strongly recommended that he start cardiac rehab for continued rehabilitation.  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      Chronic systolic heart failure secondary to NICM  Stage C  NYHA Class IIIB  ACEi/ARB:  Yes, entresto 24-26mg twice daily  BB:  Yes, increase metoprolol XL to 25mg daily  Aldosterone antagonist:  Deferred due to hyperkalemia  SCD prophylaxis:  ICD  Fluid status:  Hypervolemic, restart torsemide 60mg BID  Sleep Apnea Evaluation:  Reports central sleep apnea, but does not tolerate CPAP.  Advised that he consider repeating sleep study and evaluating possible options for treatment.  Follow-up:  2-4 weeks, with echo prior        The  above was reviewed with Karin lVad, who verbalized understanding and will call with further questions/concerns.    60 minutes spent face-to-face with patient, with >50% in counseling and/or coordination of care as described above.      Gema Chin, DNP, APRN, FNP-BC  Advanced Heart Failure Nurse Practitioner  [facility]  Patient Care Team:  Bashir Hines as PCP - General (Family Practice)  Lorena Watkins MD as MD (Cardiology)  Sparkle Joel, RN as Nurse Coordinator (Cardiology)  Cecelia Palacios, RN as Nurse Coordinator (Cardiology)  Deysi Mattson APRN CNP as Nurse Practitioner (Cardiology)  Kalli Purcell, APRN CNP as Nurse Practitioner (Cardiology)  Jermain Hobbs, ARAVIND as Nurse Coordinator (Cardiology)  SELF, REFERRED

## 2018-10-16 NOTE — LETTER
10/16/2018      RE: Danielito Chu  [id number] [place] [address]       Dear Colleague,    Thank you for the opportunity to participate in the care of your patient, Danielito Chu, at the [facility]. Please see a copy of my visit note below.    Pt was seen earlier today by Gema Chin CNP. His BB was increased as well as his diuretic. After that visit, pt had device clinic apt. The device nurse noted 240 non sustained VT episodes noted on device interrogation. Pt has a single chamber ICD.   I discussed findings with patient. He is agreeable to return tomorrow to have an echo and BNP.  I discussed findings with Dr. Waktins, who agrees with plan. If no excess volume noted on echo, pt will need RHC per Dr. Posada. Will follow up with pt tomorrow following echo/lab results.    Please do not hesitate to contact me if you have any questions/concerns.     Sincerely,     JOSE A Machado CNP

## 2018-10-16 NOTE — MR AVS SNAPSHOT
After Visit Summary   10/16/2018    Danielito Chu    MRN: [id number]           Patient Information     Date Of Birth          1955        Visit Information        Provider Department      10/16/2018 5:14 PM Rosmery Fuchs APRN CNP [facility]        Today's Diagnoses     Nonischemic cardiomyopathy (H)    -  1       Follow-ups after your visit        Your next 10 appointments already scheduled     Oct 17, 2018  1:30 PM CDT   Lab with UC LAB   [facility] ([facility])    [address] [place] [address]  1st [facility] [id number] [phone]            Oct 17, 2018  2:30 PM CDT   Ech Complete with UUECHR2   [facility] [place],  Echocardiography ([facility])    [address] [facility] [id number] [phone]           1.  Please bring or wear a comfortable two-piece outfit. 2.  You may eat, drink and take your normal medicines. 3.  For any questions that cannot be answered, please contact the ordering physician 4.  Please do not wear perfumes or scented lotions on the day of your exam.            Oct 17, 2018  3:30 PM CDT   (Arrive by 3:15 PM)   CORE RETURN with JOSE A Machado CNP   [facility] ([facility])    [address] [place] [address]  [address] [place] [id number] [phone]            Nov 26, 2018 10:30 AM CST   Lab with UC LAB   [facility] ([facility])    [address] [place] [id number] [phone]            Nov 26, 2018 11:00 AM CST   (Arrive by 10:45 AM)   CORE RETURN with Minoo Lopez NP   [facility] ([facility])    [address] [place] [address]  [address] [place] [address] [phone]            Jan 21, 2019  1:00 PM CST   (Arrive by 12:45 PM)   Implanted Defibulator with  Cv Device 1   [facility] ([facility])    [address] [place] [address]  "[facility]  3rd Floor  [id number]                 Future tests that were ordered for you today     Open Future Orders        Priority Expected Expires Ordered    CARDIAC REHAB REFERRAL Routine 10/30/2018 10/16/2019 10/16/2018    Follow-Up with CORE Clinic Routine 2018 2019 10/16/2018            Who to contact     If you have questions or need follow up information about today's clinic visit or your schedule please contact [facility] directly at [phone].  Normal or non-critical lab and imaging results will be communicated to you by Hallhart, letter or phone within 4 business days after the clinic has received the results. If you do not hear from us within 7 days, please contact the clinic through Hallhart or phone. If you have a critical or abnormal lab result, we will notify you by phone as soon as possible.  Submit refill requests through [employer] or call your pharmacy and they will forward the refill request to us. Please allow 3 business days for your refill to be completed.          Additional Information About Your Visit        Hallhart Information     [employer] lets you send messages to your doctor, view your test results, renew your prescriptions, schedule appointments and more. To sign up, go to [email or web address] . Click on \"Log in\" on the left side of the screen, which will take you to the Welcome page. Then click on \"Sign up Now\" on the right side of the page.     You will be asked to enter the access code listed below, as well as some personal information. Please follow the directions to create your username and password.     Your access code is: [email or web address]  Expires: 2018  6:31 AM     Your access code will  in 90 days. If you need help or a new code, please call your [facility] clinic or [phone].        Care EveryWhere ID     This is your Care EveryWhere ID. This could be used by other organizations to access your [facility] medical records  [id number]         " Blood Pressure from Last 3 Encounters:   10/16/18 105/70   07/19/18 (!) 83/57   07/03/18 90/52    Weight from Last 3 Encounters:   10/16/18 91.6 kg (202 lb)   07/19/18 90.7 kg (200 lb)   07/03/18 92.9 kg (204 lb 12.8 oz)              Today, you had the following     No orders found for display         Today's Medication Changes          These changes are accurate as of 10/16/18  5:18 PM.  If you have any questions, ask your nurse or doctor.               These medicines have changed or have updated prescriptions.        Dose/Directions    metoprolol succinate 25 MG 24 hr tablet   Commonly known as:  TOPROL-XL   This may have changed:  how much to take   Used for:  Acute on chronic systolic heart failure (H)   Changed by:  Gema Chin, NP        Dose:  25 mg   Take 1 tablet (25 mg) by mouth daily   Quantity:  90 tablet   Refills:  3            Where to get your medicines      These medications were sent to [facility]/pharmacy #[id number] [place] [id number]     Phone:  [phone]     metoprolol succinate 25 MG 24 hr tablet    sacubitril-valsartan 24-26 MG per tablet                Primary Care Provider Office Phone # Fax #    Bashir Hines [phone]       [address] [address]        Equal Access to Services     ALEX RODGERS AH: Hadii andres gaston hadleigho Sotabatha, waaxda luqadaha, qaybta kaalmada adeegyada, aaron nelson . So [facility] [phone].    ATENCIÓN: Si habla español, tiene a stokes disposición servicios gratuitos de asistencia lingüística. Kam al [phone].    We comply with applicable federal civil rights laws and [place] laws. We do not discriminate on the basis of race, color, national origin, age, disability, sex, sexual orientation, or gender identity.            Thank you!     Thank you for choosing [facility]  for your care. Our goal is always to provide you  with excellent care. Hearing back from our patients is one way we can continue to improve our services. Please take a few minutes to complete the written survey that you may receive in the mail after your visit with us. Thank you!             Your Updated Medication List - Protect others around you: Learn how to safely use, store and throw away your medicines at www.disposemymeds.org.          This list is accurate as of 10/16/18  5:18 PM.  Always use your most recent med list.                   Brand Name Dispense Instructions for use Diagnosis    aspirin 81 MG EC tablet     30 tablet    Take 1 tablet (81 mg) by mouth daily    Acute systolic congestive heart failure (H)       atorvastatin 10 MG tablet    LIPITOR    90 tablet    Take 1 tablet (10 mg) by mouth daily    Acute systolic congestive heart failure (H)       digoxin 250 MCG tablet    LANOXIN    90 tablet    TAKE 1 TABLET (250 MCG) BY MOUTH DAILY    Acute on chronic systolic heart failure (H)       metFORMIN 500 MG tablet    GLUCOPHAGE     Take 1 tablet (500 mg) by mouth 2 times daily (with meals)    Chronic systolic heart failure (H), Acute systolic congestive heart failure (H)       metoprolol succinate 25 MG 24 hr tablet    TOPROL-XL    90 tablet    Take 1 tablet (25 mg) by mouth daily    Acute on chronic systolic heart failure (H)       potassium chloride SA 20 MEQ CR tablet    K-DUR/KLOR-CON M    90 tablet    Take half a tablet once a day.    Acute systolic congestive heart failure (H)       sacubitril-valsartan 24-26 MG per tablet    ENTRESTO    180 tablet    Take 1 tablet by mouth 2 times daily    Chronic systolic heart failure (H)       sildenafil 50 MG tablet    VIAGRA    20 tablet    Take 1 tablet (50 mg) by mouth daily as needed    Drug-induced impotence       torsemide 20 MG tablet    DEMADEX    90 tablet    Take 3 tablets (60 mg) by mouth 2 times daily Take torsemide 60 mg in the am and 40 mg in the pm    Acute systolic congestive heart failure  (H)       traZODone 100 MG tablet    DESYREL    30 tablet    TAKE 1 TABLET BY MOUTH AT BEDTIME AS NEEDED FOR SLEEP    Insomnia, unspecified type

## 2018-10-16 NOTE — PROGRESS NOTES
Preliminary Device Interrogation Results.  Final physician signed paceart report to be scanned and attached.    Patient seen in [facility] for evaluation and iterative programming of his [employer] single lead ICD per MD orders.  Patient was seen by Jessica Chin NP, today prior to his device appointment.  Normal ICD function.  247 NSVT episodes recorded since 7/19/18 which is significantly increased for him.  Only the two most recent egms are available for review revealing 10-11 beat runs of a regular rhythm with different appearing morphology than his intrinsic.  Intrinsic rhythm = SR @ 92 bpm with intermittent trigeminal PVCs.   = <1%.   Estimated battery longevity to LINCOLN = 12 years.  Lead trends appear stable.  Patient reports that he has been feeling less well lately with more frequent bouts of fluid retention and difficulty lying down at night.  Reviewed increase in NSVT with Rosmery Fuchs NP,  who will see patient. Patient is scheduled to return to clinic on 11/26/18 and will plan to add device check at that visit.     Single Lead ICD

## 2018-10-16 NOTE — PATIENT INSTRUCTIONS
"You were seen today in the Cardiovascular Clinic at the [facility].     Cardiology Providers you saw during your visit: Gema Chin NP     Follow up and medication changes:  1. Increase metoprolol to 25mg daily.  2. Recommend taking your torsemide today for slight fluid retention.   3. We recommend you start cardiac rehab. If you do not hear from them in the next 3 days, please call [phone] to schedule.  4. Find somewhere in your community to volunteer.   5. Follow up on  with CORE clinic, with labs and an echo prior.        Results for JUAN SHEN (MRN [id number]) as of 10/16/2018 14:04   Ref. Range 10/16/2018 13:38   Sodium Latest Ref Range: 133 - 144 mmol/L 136   Potassium Latest Ref Range: 3.4 - 5.3 mmol/L 4.3   Chloride Latest Ref Range: 94 - 109 mmol/L 100   Carbon Dioxide Latest Ref Range: 20 - 32 mmol/L 30   Urea Nitrogen Latest Ref Range: 7 - 30 mg/dL 32 (H)   Creatinine Latest Ref Range: 0.66 - 1.25 mg/dL 1.51 (H)   GFR Estimate Latest Ref Range: >60 mL/min/1.7m2 47 (L)   GFR Estimate If Black Latest Ref Range: >60 mL/min/1.7m2 57 (L)   Calcium Latest Ref Range: 8.5 - 10.1 mg/dL 9.2   Anion Gap Latest Ref Range: 3 - 14 mmol/L 6   Glucose Latest Ref Range: 70 - 99 mg/dL 126 (H)       Please limit your fluid intake to 2 L (68 ounces) daily.  2 Liters a day = 8.5 cups, or 68 ounces.  Please limit your salt intake to 2 grams a day or less.     If you gain 2# in 24 hours or 5# in one week call Giselle Johnson RN so we can adjust your medications as needed over the phone.     Please feel free to call me with any questions or concerns.       Giselle Johnson RN  [facility]  Cardiology Care Coordinator-Heart Failure Clinic     Questions and [phone].   First press #1 for the adsquare and then press #3 for \"Medical Questions\" to reach us Cardiology Nurses.      On Call Cardiologist for after hours or on weekends: [phone]   option #4 " and ask to speak to the on-call Cardiologist. Inform them you are a CORE/heart failure patient at the [place].        If you need a medication refill please contact your pharmacy.  Please allow 3 business days for your refill to be completed.  _______________________________________________________  C.O.R.E. CLINIC Cardiomyopathy, Optimization, Rehabilitation, Education   The C.O.R.E. CLINIC is a heart failure specialty clinic within the [facility] where you will work with specialized nurse practitioners dedicated to helping patients with heart failure carefully adjust medications, receive education, and learn who and when to call if symptoms develop. They specialize in helping you better understand your condition, slow the progression of your disease, improve the length and quality of your life, help you detect future heart problems before they become life threatening, and avoid hospitalizations.  As always, thank you for trusting us with your health care needs!

## 2018-10-16 NOTE — MR AVS SNAPSHOT
MRN:[id number]                      After Visit Summary   10/16/2018    Danielito Chu    MRN: [id number]           Visit Information        Provider Department      10/16/2018 3:00 PM 1, Yessenia Cv Device [facility]        Your next 10 appointments already scheduled     Oct 17, 2018  1:30 PM CDT   Lab with [facility] ([facility])    [address] [place] [id number] [phone]            Oct 17, 2018  2:30 PM CDT   Ech Complete with UUECHJASIEL   [facility] [place],  Echocardiography ([facility])    [address]  [place] [address] [phone]           1.  Please bring or wear a comfortable two-piece outfit. 2.  You may eat, drink and take your normal medicines. 3.  For any questions that cannot be answered, please contact the ordering physician 4.  Please do not wear perfumes or scented lotions on the day of your exam.            Oct 17, 2018  3:30 PM CDT   (Arrive by 3:15 PM)   CORE RETURN with JOSE A Machado [facility])    [address] [place] [address]  [address] [place] [id number] [phone]            Nov 26, 2018 10:30 AM CST   Lab with  LAB    [facility] ([facility])    [address] [place] [id number] [phone]            Nov 26, 2018 11:00 AM CST   (Arrive by 10:45 AM)   CORE RETURN with Minoo Lopez NP   [facility] ([facility])    [address] [place] [address]  [address] [place] [address] [phone]            Jan 21, 2019  1:00 PM CST   (Arrive by 12:45 PM)   Implanted Defibulator with  Cv Device 1   [facility] ([facility])    [address] [place] [address]  [id number]                 MyChart Information     [employer] is an electronic gateway that provides easy, online  access to your medical records. With [employer], you can request a clinic appointment, read your test results, renew a prescription or communicate with your care team.     To sign up for [employer] visit the website at [email or web address]   You will be asked to enter the access code listed below, as well as some personal information. Please follow the directions to create your username and password.     Your access code is: [email or web address]  Expires: 2018  6:31 AM     Your access code will  in 90 days. If you need help or a new code, please contact your [facility] or call [phone] for assistance.        Care EveryWhere ID     This is your Care EveryWhere ID. This could be used by other organizations to access your [facility] medical records  [id number]        Equal Access to Services     ALEX RODGERS : Eddie Tsai, daphne pena, sandip leary, aaron pearson. So [facility] [phone].    ATENCIÓN: Si habla español, tiene a stokes disposición servicios gratuitos de asistencia lingüística. Llame al [phone].    We comply with applicable federal civil rights laws and [place] laws. We do not discriminate on the basis of race, color, national origin, age, disability, sex, sexual orientation, or gender identity.

## 2018-10-16 NOTE — LETTER
"10/16/2018      RE: Danielito Chu  [address] [place] [address]       Dear Colleague,    Thank you for the opportunity to participate in the care of your patient, Danielito Chu, at the [facility] HEART [facility]. Please see a copy of my visit note below.    Advanced Heart Failure Clinic -- CORE Follow Up  October 16, 2018    HPI:   Mr. Danielito Chu is a 63yr old male with a history of NICM (LVEF at dong of 15%) s/p ICD (4/2017), HTN, SHIRLEY, and HL who presents to CORE clinic for follow up.    He states that he feels like he is \"in CHF\" for the last 3-4 days.  He cannot sleep, and notes that his breathing is \"different.\"  He sleeps with his baseline of a couple pilllows, and denies PND and orthopnea, but notes that he is SOB when rising from a lying position.  He reports that he has central sleep apnea, and uses oxygen but cannot tolerate CPAP.  He is now tired during the day, as he is not sleeping at night.  He is not very active, and states that his activity is limited by his legs.  He notes that he feels better talking to us in clinic today, and states that his breathing is markedly improved after talking with compassionate people here.  He does not take torsemide daily.  Rather, he weighs himself a few times a day, and takes torsemide 60mg when his weight is above 198#.  He said he typically takes one dose a day, only 3-4 days each week.  He does supplement each torsemide dose with one dose of potassium.  He uses his weight as his indicator for fluid retention, as he states that he does not feel bloated or LE edema.  He does not believe that he is depressed, but notes that he is bored and does not have a typical daily routine.  He notes that his significant other has moved to [place], and he plans to visit her in November.  He otherwise denies chest pain, palpitations, dizziness, headaches, weakness, fevers, chills, nausea, vomiting, diarrhea, and " signs of bleeding.    PAST MEDICAL HISTORY:  Past Medical History:   Diagnosis Date     Acute systolic congestive heart failure (H) 2017     Diabetes (H)      HTN (hypertension)      Hyperlipidemia      Mitral regurgitation      Nocturnal oxygen desaturation 3/29/2018     Nonischemic cardiomyopathy (H) 2017     SHIRLEY (obstructive sleep apnea)      Pacemaker 2017    ICD 17       FAMILY HISTORY:  Family History   Problem Relation Age of Onset     Heart Failure Father       at age 86     Heart Failure Paternal Uncle       at 66      Myocardial Infarction Paternal Uncle       at age 62     Coronary Artery Disease Mother       during CABG at age 41       SOCIAL HISTORY:  Social History     Social History     Marital status: Single     Spouse name: N/A     Number of children: N/A     Years of education: N/A     Social History Main Topics     Smoking status: Former Smoker     Smokeless tobacco: Never Used      Comment: quit 3/2017     Alcohol use None      Comment: social     Drug use: None     Sexual activity: Not Asked     Other Topics Concern     None     Social History Narrative       CURRENT MEDICATIONS:  Current Outpatient Prescriptions   Medication Sig Dispense Refill     aspirin EC 81 MG EC tablet Take 1 tablet (81 mg) by mouth daily 30 tablet 3     atorvastatin (LIPITOR) 10 MG tablet Take 1 tablet (10 mg) by mouth daily 90 tablet 3     digoxin (LANOXIN) 250 MCG tablet TAKE 1 TABLET (250 MCG) BY MOUTH DAILY 90 tablet 3     metoprolol succinate (TOPROL-XL) 25 MG 24 hr tablet Take 0.5 tablets (12.5 mg) by mouth daily 90 tablet 3     potassium chloride SA (K-DUR/KLOR-CON M) 20 MEQ CR tablet Take half a tablet once a day. 90 tablet 3     sacubitril-valsartan (ENTRESTO) 24-26 MG per tablet Take 1 tablet by mouth 2 times daily       torsemide (DEMADEX) 20 MG tablet Take 3 tablets (60 mg) by mouth 2 times daily Take torsemide 60 mg in the am and 40 mg in the pm 90 tablet 3     traZODone  "(DESYREL) 100 MG tablet TAKE 1 TABLET BY MOUTH AT BEDTIME AS NEEDED FOR SLEEP 30 tablet 1     metFORMIN (GLUCOPHAGE) 500 MG tablet Take 1 tablet (500 mg) by mouth 2 times daily (with meals)       sildenafil (VIAGRA) 50 MG tablet Take 1 tablet (50 mg) by mouth daily as needed (Patient not taking: Reported on 10/16/2018) 20 tablet 1       ROS:   Constitutional: No fever, chills, or sweats. Stable weight.   ENT: No visual disturbance, ear ache, epistaxis, sore throat.   Allergies/Immunologic: Negative.   Respiratory: No cough, hemoptysis.   Cardiovascular: As per HPI.   GI: No nausea, vomiting, hematemesis, melena, or hematochezia.   : No urinary frequency, dysuria, or hematuria.   Integument: Negative.   Psychiatric: As per HPI.  Neuro: Negative.   Endocrinology: Negative.   Musculoskeletal: As per HPI.    EXAM:  /70 (BP Location: Right arm, Patient Position: Chair, Cuff Size: Adult Large)  Pulse 76  Ht 1.753 m (5' 9\")  Wt 91.6 kg (202 lb)  SpO2 98%  BMI 29.83 kg/m2  General: appears comfortable, alert and articulate  Head: normocephalic, atraumatic  Eyes: anicteric sclera, EOMI  Neck: no adenopathy  Orophyarynx: moist mucosa, no lesions, dentition intact  Heart: regular, S1/S2, grade 2/6 JENNY best heard at LLSB, no gallop or rub, JVD at jaw when sitting upright  Lungs: clear, no rales or wheezing  Abdomen: soft, non-tender, bowel sounds present, no hepatosplenomegaly  Extremities: no clubbing, cyanosis or LE edema  Neurological: normal speech and affect, no gross motor deficits    Labs:  CBC RESULTS:  Lab Results   Component Value Date    WBC 7.9 06/15/2018    RBC 5.84 06/15/2018    HGB 17.3 06/15/2018    HCT 53.1 (H) 06/15/2018    MCV 91 06/15/2018    MCH 29.6 06/15/2018    MCHC 32.6 06/15/2018    RDW 18.4 (H) 06/15/2018     06/15/2018       CMP RESULTS:  Lab Results   Component Value Date     10/16/2018    POTASSIUM 4.3 10/16/2018    CHLORIDE 100 10/16/2018    CO2 30 10/16/2018    ANIONGAP " 6 10/16/2018     (H) 10/16/2018    BUN 32 (H) 10/16/2018    CR 1.51 (H) 10/16/2018    GFRESTIMATED 47 (L) 10/16/2018    GFRESTBLACK 57 (L) 10/16/2018    ASHLEE 9.2 10/16/2018    BILITOTAL 3.0 (H) 06/13/2018    ALBUMIN 3.7 06/13/2018    ALKPHOS 95 06/13/2018    ALT 16 06/13/2018    AST 25 06/13/2018        INR RESULTS:  Lab Results   Component Value Date    INR 1.23 (H) 06/15/2018       Lab Results   Component Value Date    MAG 2.5 (H) 06/15/2018     Lab Results   Component Value Date    NTBNPI 3382 (H) 06/11/2018     Lab Results   Component Value Date    NTBNP 1407 (H) 07/19/2018     Diagnostics:  Reviewed in EPIC.      Assessment and Plan:   Mr. Danielito Chu is a 63yr old male with a history of NICM (LVEF at dong of 15%) s/p ICD (4/2017), HTN, SHIRLEY, and HL who presents to CORE clinic for follow up.    Labs today show stable electrolytes.  Creatinine slightly elevated, likely due to hypervolemia.    Discussed that even though his weight appears to be within his EDW (~200#), he appears hypervolemic.  He states that he has been dosing his torsemide according to this EDW since his last hospitalization.  I presume that he has lost muscle mass, as he is now not working nor engaging in regular activity, and that he is indeed volume up in spite of this weight.  Discussed that he should take torsemide 60mg this rohini and start 60mg BID tomorrow until his weight gets to ~195#.    Will also increase his metoprolol XL to 25mg daily.  Asked that he return to clinic in 2-4 weeks, with an echo prior.    Strongly recommended that he start cardiac rehab for continued rehabilitation.  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      Chronic systolic heart failure secondary to NICM  Stage C  NYHA Class IIIB  ACEi/ARB:  Yes, entresto 24-26mg twice daily  BB:  Yes, increase metoprolol XL to 25mg daily  Aldosterone antagonist:  Deferred due to  hyperkalemia  SCD prophylaxis:  ICD  Fluid status:  Hypervolemic, restart torsemide 60mg BID  Sleep Apnea Evaluation:  Reports central sleep apnea, but does not tolerate CPAP.  Advised that he consider repeating sleep study and evaluating possible options for treatment.  Follow-up:  2-4 weeks, with echo prior        The above was reviewed with Mr. Chu, who verbalized understanding and will call with further questions/concerns.    60 minutes spent face-to-face with patient, with >50% in counseling and/or coordination of care as described above.      eGma Chin, TAMANNA, APRN, FNP-BC  Advanced Heart Failure Nurse Practitioner  [facility]  Patient Care Team:  Bashir Hines as PCP - General (Family Practice)  Lorena Watkins MD as MD (Cardiology)  Sparkle Joel, RN as Nurse Coordinator (Cardiology)  Cecelia Palacios, RN as Nurse Coordinator (Cardiology)  Deysi Mattson APRN CNP as Nurse Practitioner (Cardiology)  Kalli Purcell APRN CNP as Nurse Practitioner (Cardiology)  Jermain Hobbs, ARAVIND as Nurse Coordinator (Cardiology)

## 2018-10-16 NOTE — MR AVS SNAPSHOT
After Visit Summary   10/16/2018    Danielito Chu    MRN: [id number]           Patient Information     Date Of Birth          1955        Visit Information        Provider Department      10/16/2018 3:00 PM 1, Uc Cv Device [facility]        Today's Diagnoses     Nonischemic cardiomyopathy (H)    -  1       Follow-ups after your visit        Your next 10 appointments already scheduled     Oct 17, 2018  1:30 PM CDT   Lab with  LAB   [facility] ([facility])    [address] [place] [address]  1st [facility] [id number] [phone]            Oct 17, 2018  2:30 PM CDT   Ech Complete with UUECHR2   [facility] [place],  Echocardiography ([facility] [place])    [address]  [facility] [id number] [phone]           1.  Please bring or wear a comfortable two-piece outfit. 2.  You may eat, drink and take your normal medicines. 3.  For any questions that cannot be answered, please contact the ordering physician 4.  Please do not wear perfumes or scented lotions on the day of your exam.            Oct 17, 2018  4:00 PM CDT   (Arrive by 3:45 PM)   CORE RETURN with JOSE A Machado [facility] ([facility])    [address] [place] [address]  [address] [place] [id number] [phone]            Nov 26, 2018 10:30 AM CST   Lab with [facility]    [facility] Lab ([facility])    [address] [place] [id number] [phone]            Nov 26, 2018 11:00 AM CST   (Arrive by 10:45 AM)   CORE RETURN with Minoo Lopez NP   [facility] ([facility])    [address] [place] [address]  [address] [place] [id number] [phone]            Jan 21, 2019  1:00 PM CST   (Arrive by 12:45 PM)   Implanted Defibulator with Uc Cv Device 1   [facility] ([facility] [place])    [address] [place] [address]    3rd Floor  [id number]                 Future tests that were ordered for you today     Open Future Orders        Priority Expected Expires Ordered    N terminal pro BNP outpatient Routine 10/17/2018 10/16/2019 10/16/2018    CARDIAC REHAB REFERRAL Routine 10/30/2018 10/16/2019 10/16/2018    Follow-Up with CORE Clinic Routine 2018 2019 10/16/2018            Who to contact     Please call your clinic at No information on file. to:    Ask questions about your health    Make or cancel appointments    Discuss your medicines    Learn about your test results    Speak to your doctor            Additional Information About Your Visit        SC[employer] Information     [facility] is an electronic gateway that provides easy, online access to your medical records. With [facility], you can request a clinic appointment, read your test results, renew a prescription or communicate with your care team.     To sign up for [facility] visit the website at [email or web address]   You will be asked to enter the access code listed below, as well as some personal information. Please follow the directions to create your username and password.     Your access code is: [email or web address]  Expires: 2018  6:31 AM     Your access code will  in 90 days. If you need help or a new code, please contact your [facility] or call [phone] for assistance.        Care EveryWhere ID     This is your Care EveryWhere ID. This could be used by other organizations to access your [place] medical records  [id number]         Blood Pressure from Last 3 Encounters:   10/16/18 105/70   18 (!) 83/57   18 90/52    Weight from Last 3 Encounters:   10/16/18 91.6 kg (202 lb)   18 90.7 kg (200 lb)   18 92.9 kg (204 lb 12.8 oz)              We Performed the Following     ICD DEVICE PROGRAMMING EVAL, SINGLE LEAD ICD          Today's Medication Changes          These changes are accurate as of  10/16/18  6:46 PM.  If you have any questions, ask your nurse or doctor.               These medicines have changed or have updated prescriptions.        Dose/Directions    metoprolol succinate 25 MG 24 hr tablet   Commonly known as:  TOPROL-XL   This may have changed:  how much to take   Used for:  Acute on chronic systolic heart failure (H)   Changed by:  Gema Chin NP        Dose:  25 mg   Take 1 tablet (25 mg) by mouth daily   Quantity:  90 tablet   Refills:  3            Where to get your medicines      These medications were sent to [facility]/pharmacy #[id number] - [facility] [address] [address]     Phone:  [phone]     metoprolol succinate 25 MG 24 hr tablet    sacubitril-valsartan 24-26 MG per tablet                Primary Care Provider Office Phone # Fax #    Bashir Hines [phone]       [address] [address]        Equal Access to Services     ALEX RODGERS : Hadii anders ku hadasho Soomaali, waaxda luqadaha, qaybta kaalmada adeegyada, waxay cesarin shruthi nelson . So [facility] [phone].    ATENCIÓN: Si habla español, tiene a stokes disposición servicios gratuitos de asistencia lingüística. [facility] [phone].    We comply with applicable federal civil rights laws and [place] laws. We do not discriminate on the basis of race, color, national origin, age, disability, sex, sexual orientation, or gender identity.            Thank you!     Thank you for choosing [facility]  for your care. Our goal is always to provide you with excellent care. Hearing back from our patients is one way we can continue to improve our services. Please take a few minutes to complete the written survey that you may receive in the mail after your visit with us. Thank you!             Your Updated Medication List - Protect others around you: Learn how to safely use, store and throw away your medicines at  www.disposemymeds.org.          This list is accurate as of 10/16/18  6:46 PM.  Always use your most recent med list.                   Brand Name Dispense Instructions for use Diagnosis    aspirin 81 MG EC tablet     30 tablet    Take 1 tablet (81 mg) by mouth daily    Acute systolic congestive heart failure (H)       atorvastatin 10 MG tablet    LIPITOR    90 tablet    Take 1 tablet (10 mg) by mouth daily    Acute systolic congestive heart failure (H)       digoxin 250 MCG tablet    LANOXIN    90 tablet    TAKE 1 TABLET (250 MCG) BY MOUTH DAILY    Acute on chronic systolic heart failure (H)       metFORMIN 500 MG tablet    GLUCOPHAGE     Take 1 tablet (500 mg) by mouth 2 times daily (with meals)    Chronic systolic heart failure (H), Acute systolic congestive heart failure (H)       metoprolol succinate 25 MG 24 hr tablet    TOPROL-XL    90 tablet    Take 1 tablet (25 mg) by mouth daily    Acute on chronic systolic heart failure (H)       potassium chloride SA 20 MEQ CR tablet    K-DUR/KLOR-CON M    90 tablet    Take half a tablet once a day.    Acute systolic congestive heart failure (H)       sacubitril-valsartan 24-26 MG per tablet    ENTRESTO    180 tablet    Take 1 tablet by mouth 2 times daily    Chronic systolic heart failure (H)       sildenafil 50 MG tablet    VIAGRA    20 tablet    Take 1 tablet (50 mg) by mouth daily as needed    Drug-induced impotence       torsemide 20 MG tablet    DEMADEX    90 tablet    Take 3 tablets (60 mg) by mouth 2 times daily Take torsemide 60 mg in the am and 40 mg in the pm    Acute systolic congestive heart failure (H)       traZODone 100 MG tablet    DESYREL    30 tablet    TAKE 1 TABLET BY MOUTH AT BEDTIME AS NEEDED FOR SLEEP    Insomnia, unspecified type

## 2018-10-16 NOTE — MR AVS SNAPSHOT
After Visit Summary   10/16/2018    Juan Shen    MRN: [id number]           Patient Information     Date Of Birth          1955        Visit Information        Provider Department      10/16/2018 2:30 PM Gema Chin NP [facility]        Today's Diagnoses     Chronic systolic heart failure (H)        Acute on chronic systolic heart failure (H)          Care Instructions    You were seen today in the Cardiovascular Clinic at the [facility].     Cardiology Providers you saw during your visit: Gema Chin NP     Follow up and medication changes:  1. Increase metoprolol to 25mg daily.  2. Recommend taking your torsemide today for slight fluid retention.   3. We recommend you start cardiac rehab. If you do not hear from them in the next 3 days, please call [phone] to schedule.  4. Find somewhere in your community to volunteer.   5. Follow up on November 26th with CORE clinic, with labs and an echo prior.        Results for JUAN SHEN (MRN [id number]) as of 10/16/2018 14:04   Ref. Range 10/16/2018 13:38   Sodium Latest Ref Range: 133 - 144 mmol/L 136   Potassium Latest Ref Range: 3.4 - 5.3 mmol/L 4.3   Chloride Latest Ref Range: 94 - 109 mmol/L 100   Carbon Dioxide Latest Ref Range: 20 - 32 mmol/L 30   Urea Nitrogen Latest Ref Range: 7 - 30 mg/dL 32 (H)   Creatinine Latest Ref Range: 0.66 - 1.25 mg/dL 1.51 (H)   GFR Estimate Latest Ref Range: >60 mL/min/1.7m2 47 (L)   GFR Estimate If Black Latest Ref Range: >60 mL/min/1.7m2 57 (L)   Calcium Latest Ref Range: 8.5 - 10.1 mg/dL 9.2   Anion Gap Latest Ref Range: 3 - 14 mmol/L 6   Glucose Latest Ref Range: 70 - 99 mg/dL 126 (H)       Please limit your fluid intake to 2 L (68 ounces) daily.  2 Liters a day = 8.5 cups, or 68 ounces.  Please limit your salt intake to 2 grams a day or less.     If you gain 2# in 24 hours or 5# in one week call Giselle Johnson RN so we can adjust your medications as needed  "over the phone.     Please feel free to call me with any questions or concerns.       Giselle Johnson RN  [facility]  Cardiology Care Coordinator-Heart Failure Clinic     Questions and [phone].   First press #1 for the University and then press #3 for \"Medical Questions\" to reach us Cardiology Nurses.      On Call Cardiologist for after hours or on weekends: [phone]   option #4 and ask to speak to the on-call Cardiologist. Inform them you are a CORE/heart failure patient at the [place].        If you need a medication refill please contact your pharmacy.  Please allow 3 business days for your refill to be completed.  _______________________________________________________  C.O.R.E. CLINIC Cardiomyopathy, Optimization, Rehabilitation, Education   The C.O.R.E. CLINIC is a heart failure specialty clinic within the [facility] where you will work with specialized nurse practitioners dedicated to helping patients with heart failure carefully adjust medications, receive education, and learn who and when to call if symptoms develop. They specialize in helping you better understand your condition, slow the progression of your disease, improve the length and quality of your life, help you detect future heart problems before they become life threatening, and avoid hospitalizations.  As always, thank you for trusting us with your health care needs!          Follow-ups after your visit        Additional Services     CARDIAC REHAB REFERRAL       If you have not heard from the scheduling office within 2 business days, please call [phone] for all locations, with the exception of [place], please call [phone] and [place], please call [phone].    Please be aware that coverage of these services is subject to the terms and limitations of your health insurance plan.  Call member services at your health plan with any benefit or coverage " questions.            Follow-Up with [facility] Clinic                 Your next 10 appointments already scheduled     Nov 26, 2018  9:30 AM CST   Ech Complete with [id number]    Health Echo ([facility])    [address]  [address] [facility] [address] [phone]           1.  Please bring or wear a comfortable two-piece outfit. 2.  You may eat, drink and take your normal medicines. 3.  For any questions that cannot be answered, please contact the ordering physician 4.  Please do not wear perfumes or scented lotions on the day of your exam.            Nov 26, 2018 10:30 AM CST   Lab with TAYLER LAB    Health Lab ([facility])    [address] [address] [phone]            Nov 26, 2018 11:00 AM CST   (Arrive by 10:45 AM)   CORE RETURN with Minoo Lopez NP   [facility] ([facility])    [address] [place] [address]  [address] [place] [address] [phone]            Jan 21, 2019  1:00 PM CST   (Arrive by 12:45 PM)   Implanted Defibulator with  Cv Device 1   [facility] ([facility])    [address] [place] [address]  [id number]                 Future tests that were ordered for you today     Open Future Orders        Priority Expected Expires Ordered    CARDIAC REHAB REFERRAL Routine 10/30/2018 10/16/2019 10/16/2018    Follow-Up with CORE Clinic Routine 11/26/2018 1/21/2019 10/16/2018            Who to contact     If you have questions or need follow up information about today's clinic visit or your schedule please contact [facility] directly at [phone].  Normal or non-critical lab and imaging results will be communicated to you by MyChart, letter or phone within 4 business days after the clinic has received the results. If you do not hear from us within 7 days, please contact the clinic through MyChart or phone. If you have a  "critical or abnormal lab result, we will notify you by phone as soon as possible.  Submit refill requests through [employer] or call your pharmacy and they will forward the refill request to us. Please allow 3 business days for your refill to be completed.          Additional Information About Your Visit        Prif[employer]ath[employer] Information     [employer] lets you send messages to your doctor, view your test results, renew your prescriptions, schedule appointments and more. To sign up, go to [email or web address] . Click on \"Log in\" on the left side of the screen, which will take you to the Welcome page. Then click on \"Sign up Now\" on the right side of the page.     You will be asked to enter the access code listed below, as well as some personal information. Please follow the directions to create your username and password.     Your access code is: [email or web address]  Expires: 2018  6:31 AM     Your access code will  in 90 days. If you need help or a new code, please call your [facility] clinic or [phone].        Care EveryWhere ID     This is your Care EveryWhere ID. This could be used by other organizations to access your [facility] medical records  [id number]        Your Vitals Were     Pulse Height Pulse Oximetry BMI (Body Mass Index)          76 1.753 m (5' 9\") 98% 29.83 kg/m2         Blood Pressure from Last 3 Encounters:   10/16/18 105/70   18 (!) 83/57   18 90/52    Weight from Last 3 Encounters:   10/16/18 91.6 kg (202 lb)   18 90.7 kg (200 lb)   18 92.9 kg (204 lb 12.8 oz)              We Performed the Following     Follow-Up with [facility]          Today's Medication Changes          These changes are accurate as of 10/16/18  4:13 PM.  If you have any questions, ask your nurse or doctor.               These medicines have changed or have updated prescriptions.        Dose/Directions    metoprolol succinate 25 MG 24 hr tablet   Commonly known as:  TOPROL-XL   This may have changed:  " how much to take   Used for:  Acute on chronic systolic heart failure (H)   Changed by:  Gema Chin NP        Dose:  25 mg   Take 1 tablet (25 mg) by mouth daily   Quantity:  90 tablet   Refills:  3            Where to get your medicines      These medications were sent to [facility]/pharmacy #[id number] - [address] [address]     Phone:  [phone]     metoprolol succinate 25 MG 24 hr tablet    sacubitril-valsartan 24-26 MG per tablet                Primary Care Provider Office Phone # Fax #    Bashir Hines [phone]       [facility] [address] [address]        Equal Access to Services     ALEX RODGERS : Eddie fuenteso Sotabatha, waaxda luqadaha, qaybta kaalmada adeegyada, aaron pearson. So [facility] [phone].    ATENCIÓN: Si habla español, tiene a stokes disposición servicios gratuitos de asistencia lingüística. [facility] [phone].    We comply with applicable federal civil rights laws and [place] laws. We do not discriminate on the basis of race, color, national origin, age, disability, sex, sexual orientation, or gender identity.            Thank you!     Thank you for choosing [facility]  for your care. Our goal is always to provide you with excellent care. Hearing back from our patients is one way we can continue to improve our services. Please take a few minutes to complete the written survey that you may receive in the mail after your visit with us. Thank you!             Your Updated Medication List - Protect others around you: Learn how to safely use, store and throw away your medicines at www.disposemymeds.org.          This list is accurate as of 10/16/18  4:13 PM.  Always use your most recent med list.                   Brand Name Dispense Instructions for use Diagnosis    aspirin 81 MG EC tablet     30 tablet    Take 1 tablet (81 mg) by mouth daily    Acute  systolic congestive heart failure (H)       atorvastatin 10 MG tablet    LIPITOR    90 tablet    Take 1 tablet (10 mg) by mouth daily    Acute systolic congestive heart failure (H)       digoxin 250 MCG tablet    LANOXIN    90 tablet    TAKE 1 TABLET (250 MCG) BY MOUTH DAILY    Acute on chronic systolic heart failure (H)       metFORMIN 500 MG tablet    GLUCOPHAGE     Take 1 tablet (500 mg) by mouth 2 times daily (with meals)    Chronic systolic heart failure (H), Acute systolic congestive heart failure (H)       metoprolol succinate 25 MG 24 hr tablet    TOPROL-XL    90 tablet    Take 1 tablet (25 mg) by mouth daily    Acute on chronic systolic heart failure (H)       potassium chloride SA 20 MEQ CR tablet    K-DUR/KLOR-CON M    90 tablet    Take half a tablet once a day.    Acute systolic congestive heart failure (H)       sacubitril-valsartan 24-26 MG per tablet    ENTRESTO    180 tablet    Take 1 tablet by mouth 2 times daily    Chronic systolic heart failure (H)       sildenafil 50 MG tablet    VIAGRA    20 tablet    Take 1 tablet (50 mg) by mouth daily as needed    Drug-induced impotence       torsemide 20 MG tablet    DEMADEX    90 tablet    Take 3 tablets (60 mg) by mouth 2 times daily Take torsemide 60 mg in the am and 40 mg in the pm    Acute systolic congestive heart failure (H)       traZODone 100 MG tablet    DESYREL    30 tablet    TAKE 1 TABLET BY MOUTH AT BEDTIME AS NEEDED FOR SLEEP    Insomnia, unspecified type

## 2018-10-16 NOTE — NURSING NOTE
Chief Complaint   Patient presents with     Follow Up For     63 year old male presents with chronic systolic heart failure for follow up with echo and labs prior     Vitals were taken and medications were reconciled.    Mary Her CMA     1:54 PM

## 2018-10-17 ENCOUNTER — HOSPITAL ENCOUNTER (OUTPATIENT)
Dept: CARDIOLOGY | Facility: CLINIC | Age: 63
Discharge: HOME | End: 2018-10-17
Attending: INTERNAL MEDICINE | Admitting: INTERNAL MEDICINE
Payer: COMMERCIAL

## 2018-10-17 ENCOUNTER — OFFICE VISIT (OUTPATIENT)
Dept: CARDIOLOGY | Facility: CLINIC | Age: 63
End: 2018-10-17
Attending: NURSE PRACTITIONER
Payer: COMMERCIAL

## 2018-10-17 VITALS
OXYGEN SATURATION: 98 % | BODY MASS INDEX: 30.47 KG/M2 | WEIGHT: 205.7 LBS | HEART RATE: 94 BPM | SYSTOLIC BLOOD PRESSURE: 110 MMHG | HEIGHT: 69 IN | DIASTOLIC BLOOD PRESSURE: 71 MMHG

## 2018-10-17 DIAGNOSIS — I50.22 CHRONIC SYSTOLIC HEART FAILURE (H): ICD-10-CM

## 2018-10-17 DIAGNOSIS — I50.21 ACUTE SYSTOLIC CONGESTIVE HEART FAILURE (H): ICD-10-CM

## 2018-10-17 LAB — NT-PROBNP SERPL-MCNC: 1622 PG/ML (ref 0–125)

## 2018-10-17 PROCEDURE — 93306 TTE W/DOPPLER COMPLETE: CPT | Mod: 26 | Performed by: INTERNAL MEDICINE

## 2018-10-17 PROCEDURE — 36415 COLL VENOUS BLD VENIPUNCTURE: CPT | Performed by: NURSE PRACTITIONER

## 2018-10-17 PROCEDURE — 99214 OFFICE O/P EST MOD 30 MIN: CPT | Mod: ZP | Performed by: NURSE PRACTITIONER

## 2018-10-17 PROCEDURE — 83880 ASSAY OF NATRIURETIC PEPTIDE: CPT | Performed by: NURSE PRACTITIONER

## 2018-10-17 PROCEDURE — G0463 HOSPITAL OUTPT CLINIC VISIT: HCPCS | Mod: ZF

## 2018-10-17 PROCEDURE — 25500064 ZZH RX 255 OP 636: Performed by: INTERNAL MEDICINE

## 2018-10-17 RX ORDER — TORSEMIDE 20 MG/1
60 TABLET ORAL 2 TIMES DAILY
Qty: 10 TABLET | Refills: 0 | Status: ON HOLD | OUTPATIENT
Start: 2018-10-17 | End: 2018-10-29

## 2018-10-17 RX ADMIN — HUMAN ALBUMIN MICROSPHERES AND PERFLUTREN 6 ML: 10; .22 INJECTION, SOLUTION INTRAVENOUS at 14:30

## 2018-10-17 ASSESSMENT — PAIN SCALES - GENERAL: PAINLEVEL: NO PAIN (0)

## 2018-10-17 NOTE — PROGRESS NOTES
Discussed echo results with pt.  Also discussed with Dr. Watkins, who recommends admission, RHC and further evaluation for advanced therapies with consideration of initation of Amiodarone for VT.  Pt agrees with plan, but requests to go home to his friends house tonight and return in am for admission. All are in agreement with the plan. Diuretics were called into pharmacy as he has all his home medications, except his diuretics.    Assessment: Systolic Heart Failure with worsening EF  Plan: Admit in am for RCH/diuresis. Torsemide Rx sent to pharmacy, as pt did not have his torsemide with him. He was instructed to take 60mg tonight and again in the am.

## 2018-10-17 NOTE — LETTER
10/17/2018      RE: Danielito Chu  [address] [place] [address]       Dear Colleague,    Thank you for the opportunity to participate in the care of your patient, Danielito Chu, at the [facility]. Please see a copy of my visit note below.    Discussed echo results with pt.  Also discussed with Dr. Watkins, who recommends admission, RHC and further evaluation for advanced therapies with consideration of initation of Amiodarone for VT.  Pt agrees with plan, but requests to go home to his friends house tonight and return in am for admission. All are in agreement with the plan. Diuretics were called into pharmacy as he has all his home medications, except his diuretics.    Assessment: Systolic Heart Failure with worsening EF  Plan: Admit in am for RCH/diuresis. Torsemide Rx sent to pharmacy, as pt did not have his torsemide with him. He was instructed to take 60mg tonight and again in the am.     JOSE A Machado CNP

## 2018-10-17 NOTE — PATIENT INSTRUCTIONS
"You were seen today in the Cardiovascular Clinic at the [facility].     Cardiology Providers you saw during your visit: Rosmery Fuchs NP     Follow up and medication changes:  1. We will admit you to the cardiac floor of the hospital ([facility]). I will call you tomorrow when a bed is available. When you get to the hospital, check into admitting near the main entrance. They will direct you exactly where you should go.       Results for JUAN SHEN (MRN [id number]) as of 10/17/2018 15:22   Ref. Range 10/17/2018 14:38   N-Terminal Pro Bnp Latest Ref Range: 0 - 125 pg/mL 1622 (H)         Please limit your fluid intake to 2 L (68 ounces) daily.  2 Liters a day = 8.5 cups, or 68 ounces.  Please limit your salt intake to 2 grams a day or less.     If you gain 2# in 24 hours or 5# in one week call Giselle Johnson RN so we can adjust your medications as needed over the phone.     Please feel free to call me with any questions or concerns.       Giselle Johnson RN  [facility]  Cardiology Care Coordinator-Heart Failure Clinic     Questions and [phone].   First press #1 for the University and then press #3 for \"Medical Questions\" to reach us Cardiology Nurses.      On Call Cardiologist for after hours or on weekends: [phone]   option #4 and ask to speak to the on-call Cardiologist. Inform them you are a CORE/heart failure patient at the [place].        If you need a medication refill please contact your pharmacy.  Please allow 3 business days for your refill to be completed.  _______________________________________________________  C.O.R.E. CLINIC Cardiomyopathy, Optimization, Rehabilitation, Education   The C.O.R.E. CLINIC is a heart failure specialty clinic within the [facility] where you will work with specialized nurse practitioners dedicated to helping patients with heart failure carefully adjust medications, receive education, and " learn who and when to call if symptoms develop. They specialize in helping you better understand your condition, slow the progression of your disease, improve the length and quality of your life, help you detect future heart problems before they become life threatening, and avoid hospitalizations.  As always, thank you for trusting us with your health care needs!

## 2018-10-17 NOTE — MR AVS SNAPSHOT
"              After Visit Summary   10/17/2018    Juan Chu    MRN: [id number]           Patient Information     Date Of Birth          1955        Visit Information        Provider Department      10/17/2018 4:00 PM Rosmery Fuchs APRN [facility]        Today's Diagnoses     Acute systolic congestive heart failure (H)          Care Instructions    You were seen today in the Cardiovascular Clinic at the [facility].     Cardiology Providers you saw during your visit: Rosmery Fuchs NP     Follow up and medication changes:  1. We will admit you to the cardiac floor of the hospital ([facility]). I will call you tomorrow when a bed is available. When you get to the hospital, check into admitting near the main entrance. They will direct you exactly where you should go.       Results for JUAN CHU (MRN [id number]) as of 10/17/2018 15:22   Ref. Range 10/17/2018 14:38   N-Terminal Pro Bnp Latest Ref Range: 0 - 125 pg/mL 1622 (H)         Please limit your fluid intake to 2 L (68 ounces) daily.  2 Liters a day = 8.5 cups, or 68 ounces.  Please limit your salt intake to 2 grams a day or less.     If you gain 2# in 24 hours or 5# in one week call Giselle Johnson RN so we can adjust your medications as needed over the phone.     Please feel free to call me with any questions or concerns.       Giselle Johnson RN  [facility]  Cardiology Care Coordinator-Heart Failure Clinic     Questions and [phone].   First press #1 for the [employer] and then press #3 for \"Medical Questions\" to reach us Cardiology Nurses.      On Call Cardiologist for after hours or on weekends: [phone]   option #4 and ask to speak to the on-call Cardiologist. Inform them you are a CORE/heart failure patient at the [place].        If you need a medication refill please contact your pharmacy.  Please allow 3 business days for your refill to be " completed.  _______________________________________________________  C.O.R.E. CLINIC Cardiomyopathy, Optimization, Rehabilitation, Education   The C.O.R.E. CLINIC is a heart failure specialty clinic within the [facility] where you will work with specialized nurse practitioners dedicated to helping patients with heart failure carefully adjust medications, receive education, and learn who and when to call if symptoms develop. They specialize in helping you better understand your condition, slow the progression of your disease, improve the length and quality of your life, help you detect future heart problems before they become life threatening, and avoid hospitalizations.  As always, thank you for trusting us with your health care needs!          Follow-ups after your visit        Your next 10 appointments already scheduled     Nov 26, 2018  9:45 AM CST   Lab with [facility]   [facility])    [address] [place] [id number] [phone]            Nov 26, 2018 10:30 AM CST   Implanted Defibulator with Uc Cv Device [address] [place] [address] ([facility])    [address] [place] [address]  [id number]               Nov 26, 2018 11:00 AM CST   (Arrive by 10:45 AM)   CORE RETURN with Minoo Lopez NP   [facility] ([facility])    [address] [place] [id number] [phone]            Jan 21, 2019  1:00 PM CST   (Arrive by 12:45 PM)   Implanted Defibulator with Uc Cv Device [address] [place] [address])    [address] [place] [address]  [id number]                 Future tests that were ordered for you today     Open Future Orders        Priority Expected Expires Ordered    CARDIAC REHAB REFERRAL Routine 10/30/2018 10/16/2019 10/16/2018    Follow-Up with CORE Clinic Routine 11/26/2018 1/21/2019  "10/16/2018    ECHO COMPLETE WITH OPTISON Routine 10/2/2018 2019 2018            Who to contact     If you have questions or need follow up information about today's clinic visit or your schedule please contact [facility] directly at [phone].  Normal or non-critical lab and imaging results will be communicated to you by MyChart, letter or phone within 4 business days after the clinic has received the results. If you do not hear from us within 7 days, please contact the clinic through [employer] or phone. If you have a critical or abnormal lab result, we will notify you by phone as soon as possible.  Submit refill requests through [employer] or call your pharmacy and they will forward the refill request to us. Please allow 3 business days for your refill to be completed.          Additional Information About Your Visit        MyChart Information     [employer] lets you send messages to your doctor, view your test results, renew your prescriptions, schedule appointments and more. To sign up, go to [email or web address] . Click on \"Log in\" on the left side of the screen, which will take you to the Welcome page. Then click on \"Sign up Now\" on the right side of the page.     You will be asked to enter the access code listed below, as well as some personal information. Please follow the directions to create your username and password.     Your access code is: [email or web address]  Expires: 2018  6:31 AM     Your access code will  in 90 days. If you need help or a new code, please call your [facility] clinic or [phone].        Care EveryWhere ID     This is your Care EveryWhere ID. This could be used by other organizations to access your [facility] medical records  [id number]        Your Vitals Were     Pulse Height Pulse Oximetry BMI (Body Mass Index)          94 1.753 m (5' 9\") 98% 30.38 kg/m2         Blood Pressure from Last 3 Encounters:   10/17/18 110/71   10/16/18 105/70   18 (!) 83/57    " Weight from Last 3 Encounters:   10/17/18 93.3 kg (205 lb 11.2 oz)   10/16/18 91.6 kg (202 lb)   07/19/18 90.7 kg (200 lb)              Today, you had the following     No orders found for display         Where to get your medicines      These medications were sent to [facility] [place], [place] - [address]  [address], [facility] [address]    Hours:  TRANSPLANT PHONE NUMBER [phone] Phone:  [phone]     torsemide 20 MG tablet          Primary Care Provider Office Phone # Fax #    Bashir Hines [phone]       [facility] [address] [address]        Equal Access to Services     ALEX RODGERS : Eddie fuenteso Soomaali, waaxda luqadaha, qaybta kaalmada adeegyada, aaron pearson. So [facility] [phone].    ATENCIÓN: Si habla español, tiene a stokes disposición servicios gratuitos de asistencia lingüística. Llame al [phone].    We comply with applicable federal civil rights laws and [place] laws. We do not discriminate on the basis of race, color, national origin, age, disability, sex, sexual orientation, or gender identity.            Thank you!     Thank you for choosing [facility]  for your care. Our goal is always to provide you with excellent care. Hearing back from our patients is one way we can continue to improve our services. Please take a few minutes to complete the written survey that you may receive in the mail after your visit with us. Thank you!             Your Updated Medication List - Protect others around you: Learn how to safely use, store and throw away your medicines at www.disposemymeds.org.          This list is accurate as of 10/17/18  4:26 PM.  Always use your most recent med list.                   Brand Name Dispense Instructions for use Diagnosis    aspirin 81 MG EC tablet     30 tablet    Take 1 tablet (81 mg) by mouth daily    Acute systolic  congestive heart failure (H)       atorvastatin 10 MG tablet    LIPITOR    90 tablet    Take 1 tablet (10 mg) by mouth daily    Acute systolic congestive heart failure (H)       digoxin 250 MCG tablet    LANOXIN    90 tablet    TAKE 1 TABLET (250 MCG) BY MOUTH DAILY    Acute on chronic systolic heart failure (H)       metFORMIN 500 MG tablet    GLUCOPHAGE     Take 1 tablet (500 mg) by mouth 2 times daily (with meals)    Chronic systolic heart failure (H), Acute systolic congestive heart failure (H)       metoprolol succinate 25 MG 24 hr tablet    TOPROL-XL    90 tablet    Take 1 tablet (25 mg) by mouth daily    Acute on chronic systolic heart failure (H)       potassium chloride SA 20 MEQ CR tablet    K-DUR/KLOR-CON M    90 tablet    Take half a tablet once a day.    Acute systolic congestive heart failure (H)       sacubitril-valsartan 24-26 MG per tablet    ENTRESTO    180 tablet    Take 1 tablet by mouth 2 times daily    Chronic systolic heart failure (H)       sildenafil 50 MG tablet    VIAGRA    20 tablet    Take 1 tablet (50 mg) by mouth daily as needed    Drug-induced impotence       torsemide 20 MG tablet    DEMADEX    10 tablet    Take 3 tablets (60 mg) by mouth 2 times daily Take torsemide 60 mg in the am and 40 mg in the pm    Acute systolic congestive heart failure (H)       traZODone 100 MG tablet    DESYREL    30 tablet    TAKE 1 TABLET BY MOUTH AT BEDTIME AS NEEDED FOR SLEEP    Insomnia, unspecified type

## 2018-10-17 NOTE — NURSING NOTE
Chief Complaint   Patient presents with     Follow Up For     Reason for visit: RTN CORE: 63 year old male presents with chronic systolic heart failure for follow up with echo and labs prior     Vitals were taken and medications were reconciled.     JOSE M Rodriguez  3:19 PM

## 2018-10-18 ENCOUNTER — DOCUMENTATION ONLY (OUTPATIENT)
Dept: CARDIOLOGY | Facility: CLINIC | Age: 63
End: 2018-10-18

## 2018-10-18 NOTE — PROGRESS NOTES
RE: Updated consent form     Trial: INfluenza Vaccine to Effectively Stop Cardio Thoracic     Events and Decompensated heart failure (INVESTED)      IRB# [id number]       PI: Lorena Watkins MD (p) [phone]         There was a recent update to study consent form.  The current IRB approved consent form was reviewed and discussed with the subject. This included review of the purpose, research hypothesis, nature of the research, risks & benefits, and changes to follow up procedures.  Confidentiality issues, compensation for injury, and alternative procedures available were also explained. Subject was reminded that participation is voluntary and that refusal to participate will involve no penalty or decrease benefits to which the subject is otherwise entitled. Patient had the opportunity to read the consent, ask questions and was offered sufficient time to consider signing the updated consent form.  Patient was able to clearly state changes that were addressed. All questions and concerns were addressed. Patient consented on 10/16/18 at 15:00 and given a copy.

## 2018-10-19 ENCOUNTER — HOSPITAL ENCOUNTER (INPATIENT)
Facility: CLINIC | Age: 63
LOS: 11 days | Discharge: HOME | DRG: 287 | End: 2018-10-30
Attending: INTERNAL MEDICINE | Admitting: INTERNAL MEDICINE
Payer: COMMERCIAL

## 2018-10-19 ENCOUNTER — APPOINTMENT (OUTPATIENT)
Dept: GENERAL RADIOLOGY | Facility: CLINIC | Age: 63
DRG: 287 | End: 2018-10-19
Attending: INTERNAL MEDICINE
Payer: COMMERCIAL

## 2018-10-19 DIAGNOSIS — I50.9 CHF (CONGESTIVE HEART FAILURE) (H): ICD-10-CM

## 2018-10-19 DIAGNOSIS — F32.A DEPRESSION, UNSPECIFIED DEPRESSION TYPE: Primary | ICD-10-CM

## 2018-10-19 DIAGNOSIS — I50.21 ACUTE SYSTOLIC CONGESTIVE HEART FAILURE (H): ICD-10-CM

## 2018-10-19 DIAGNOSIS — I50.23 ACUTE ON CHRONIC SYSTOLIC CONGESTIVE HEART FAILURE (H): ICD-10-CM

## 2018-10-19 DIAGNOSIS — I50.23 ACUTE ON CHRONIC SYSTOLIC HEART FAILURE (H): ICD-10-CM

## 2018-10-19 LAB
ALBUMIN SERPL-MCNC: 3.5 G/DL (ref 3.4–5)
ALP SERPL-CCNC: 78 U/L (ref 40–150)
ALT SERPL W P-5'-P-CCNC: 11 U/L (ref 0–70)
ANION GAP SERPL CALCULATED.3IONS-SCNC: 8 MMOL/L (ref 3–14)
AST SERPL W P-5'-P-CCNC: 14 U/L (ref 0–45)
BASOPHILS # BLD AUTO: 0 10E9/L (ref 0–0.2)
BASOPHILS NFR BLD AUTO: 0.4 %
BILIRUB SERPL-MCNC: 2.2 MG/DL (ref 0.2–1.3)
BUN SERPL-MCNC: 27 MG/DL (ref 7–30)
CALCIUM SERPL-MCNC: 8.7 MG/DL (ref 8.5–10.1)
CHLORIDE SERPL-SCNC: 101 MMOL/L (ref 94–109)
CO2 SERPL-SCNC: 29 MMOL/L (ref 20–32)
CREAT SERPL-MCNC: 1.35 MG/DL (ref 0.66–1.25)
DIFFERENTIAL METHOD BLD: ABNORMAL
DIGOXIN SERPL-MCNC: 1.2 UG/L (ref 0.5–2)
EOSINOPHIL # BLD AUTO: 0.1 10E9/L (ref 0–0.7)
EOSINOPHIL NFR BLD AUTO: 1.9 %
ERYTHROCYTE [DISTWIDTH] IN BLOOD BY AUTOMATED COUNT: 16.1 % (ref 10–15)
GFR SERPL CREATININE-BSD FRML MDRD: 53 ML/MIN/1.7M2
GLUCOSE BLDC GLUCOMTR-MCNC: 128 MG/DL (ref 70–99)
GLUCOSE SERPL-MCNC: 102 MG/DL (ref 70–99)
HBA1C MFR BLD: 6.4 % (ref 0–5.6)
HCT VFR BLD AUTO: 53.2 % (ref 40–53)
HGB BLD-MCNC: 16.8 G/DL (ref 13.3–17.7)
IMM GRANULOCYTES # BLD: 0 10E9/L (ref 0–0.4)
IMM GRANULOCYTES NFR BLD: 0.2 %
INR PPP: 1.39 (ref 0.86–1.14)
LACTATE BLD-SCNC: 1.9 MMOL/L (ref 0.7–2)
LYMPHOCYTES # BLD AUTO: 1.2 10E9/L (ref 0.8–5.3)
LYMPHOCYTES NFR BLD AUTO: 21.4 %
MAGNESIUM SERPL-MCNC: 2.1 MG/DL (ref 1.6–2.3)
MCH RBC QN AUTO: 31 PG (ref 26.5–33)
MCHC RBC AUTO-ENTMCNC: 31.6 G/DL (ref 31.5–36.5)
MCV RBC AUTO: 98 FL (ref 78–100)
MONOCYTES # BLD AUTO: 0.8 10E9/L (ref 0–1.3)
MONOCYTES NFR BLD AUTO: 14.1 %
NEUTROPHILS # BLD AUTO: 3.3 10E9/L (ref 1.6–8.3)
NEUTROPHILS NFR BLD AUTO: 62 %
NRBC # BLD AUTO: 0 10*3/UL
NRBC BLD AUTO-RTO: 0 /100
PLATELET # BLD AUTO: 188 10E9/L (ref 150–450)
POTASSIUM SERPL-SCNC: 3.9 MMOL/L (ref 3.4–5.3)
PROT SERPL-MCNC: 7.4 G/DL (ref 6.8–8.8)
RBC # BLD AUTO: 5.42 10E12/L (ref 4.4–5.9)
SODIUM SERPL-SCNC: 138 MMOL/L (ref 133–144)
T4 FREE SERPL-MCNC: 1.17 NG/DL (ref 0.76–1.46)
TSH SERPL DL<=0.005 MIU/L-ACNC: 6.19 MU/L (ref 0.4–4)
WBC # BLD AUTO: 5.4 10E9/L (ref 4–11)

## 2018-10-19 PROCEDURE — 25000132 ZZH RX MED GY IP 250 OP 250 PS 637: Performed by: STUDENT IN AN ORGANIZED HEALTH CARE EDUCATION/TRAINING PROGRAM

## 2018-10-19 PROCEDURE — 83605 ASSAY OF LACTIC ACID: CPT | Performed by: INTERNAL MEDICINE

## 2018-10-19 PROCEDURE — 71045 X-RAY EXAM CHEST 1 VIEW: CPT

## 2018-10-19 PROCEDURE — 85610 PROTHROMBIN TIME: CPT | Performed by: INTERNAL MEDICINE

## 2018-10-19 PROCEDURE — 40000802 ZZH SITE CHECK

## 2018-10-19 PROCEDURE — 36415 COLL VENOUS BLD VENIPUNCTURE: CPT | Performed by: INTERNAL MEDICINE

## 2018-10-19 PROCEDURE — 00000146 ZZHCL STATISTIC GLUCOSE BY METER IP

## 2018-10-19 PROCEDURE — 40000141 ZZH STATISTIC PERIPHERAL IV START W/O US GUIDANCE

## 2018-10-19 PROCEDURE — 80053 COMPREHEN METABOLIC PANEL: CPT | Performed by: INTERNAL MEDICINE

## 2018-10-19 PROCEDURE — 80162 ASSAY OF DIGOXIN TOTAL: CPT | Performed by: INTERNAL MEDICINE

## 2018-10-19 PROCEDURE — 99223 1ST HOSP IP/OBS HIGH 75: CPT | Mod: GC | Performed by: INTERNAL MEDICINE

## 2018-10-19 PROCEDURE — 83036 HEMOGLOBIN GLYCOSYLATED A1C: CPT | Performed by: STUDENT IN AN ORGANIZED HEALTH CARE EDUCATION/TRAINING PROGRAM

## 2018-10-19 PROCEDURE — 84443 ASSAY THYROID STIM HORMONE: CPT | Performed by: INTERNAL MEDICINE

## 2018-10-19 PROCEDURE — 93005 ELECTROCARDIOGRAM TRACING: CPT

## 2018-10-19 PROCEDURE — 84439 ASSAY OF FREE THYROXINE: CPT | Performed by: INTERNAL MEDICINE

## 2018-10-19 PROCEDURE — 93010 ELECTROCARDIOGRAM REPORT: CPT | Performed by: INTERNAL MEDICINE

## 2018-10-19 PROCEDURE — 25000128 H RX IP 250 OP 636: Performed by: STUDENT IN AN ORGANIZED HEALTH CARE EDUCATION/TRAINING PROGRAM

## 2018-10-19 PROCEDURE — 85025 COMPLETE CBC W/AUTO DIFF WBC: CPT | Performed by: INTERNAL MEDICINE

## 2018-10-19 PROCEDURE — 21400006 ZZH R&B CCU INTERMEDIATE UMMC

## 2018-10-19 PROCEDURE — 83735 ASSAY OF MAGNESIUM: CPT | Performed by: INTERNAL MEDICINE

## 2018-10-19 RX ORDER — TRAZODONE HYDROCHLORIDE 50 MG/1
50 TABLET, FILM COATED ORAL
Status: DISCONTINUED | OUTPATIENT
Start: 2018-10-19 | End: 2018-10-30 | Stop reason: HOSPADM

## 2018-10-19 RX ORDER — ASPIRIN 81 MG/1
81 TABLET ORAL DAILY
Status: DISCONTINUED | OUTPATIENT
Start: 2018-10-19 | End: 2018-10-30 | Stop reason: HOSPADM

## 2018-10-19 RX ORDER — BUPROPION HYDROCHLORIDE 150 MG/1
150 TABLET ORAL DAILY
Status: DISCONTINUED | OUTPATIENT
Start: 2018-10-20 | End: 2018-10-30 | Stop reason: HOSPADM

## 2018-10-19 RX ORDER — NICOTINE POLACRILEX 4 MG
15-30 LOZENGE BUCCAL
Status: DISCONTINUED | OUTPATIENT
Start: 2018-10-19 | End: 2018-10-30 | Stop reason: HOSPADM

## 2018-10-19 RX ORDER — SIMETHICONE 80 MG
80 TABLET,CHEWABLE ORAL EVERY 6 HOURS PRN
Status: DISCONTINUED | OUTPATIENT
Start: 2018-10-19 | End: 2018-10-30 | Stop reason: HOSPADM

## 2018-10-19 RX ORDER — BUMETANIDE 0.25 MG/ML
4 INJECTION INTRAMUSCULAR; INTRAVENOUS ONCE
Status: COMPLETED | OUTPATIENT
Start: 2018-10-19 | End: 2018-10-19

## 2018-10-19 RX ORDER — DIGOXIN 125 MCG
250 TABLET ORAL DAILY
Status: DISCONTINUED | OUTPATIENT
Start: 2018-10-20 | End: 2018-10-30 | Stop reason: HOSPADM

## 2018-10-19 RX ORDER — ATORVASTATIN CALCIUM 10 MG/1
10 TABLET, FILM COATED ORAL DAILY
Status: DISCONTINUED | OUTPATIENT
Start: 2018-10-19 | End: 2018-10-30 | Stop reason: HOSPADM

## 2018-10-19 RX ORDER — DEXTROSE MONOHYDRATE 25 G/50ML
25-50 INJECTION, SOLUTION INTRAVENOUS
Status: DISCONTINUED | OUTPATIENT
Start: 2018-10-19 | End: 2018-10-30 | Stop reason: HOSPADM

## 2018-10-19 RX ADMIN — ATORVASTATIN CALCIUM 10 MG: 10 TABLET, FILM COATED ORAL at 20:53

## 2018-10-19 RX ADMIN — BUMETANIDE 4 MG: 0.25 INJECTION INTRAMUSCULAR; INTRAVENOUS at 18:06

## 2018-10-19 RX ADMIN — SACUBITRIL AND VALSARTAN 1 TABLET: 24; 26 TABLET, FILM COATED ORAL at 20:53

## 2018-10-19 ASSESSMENT — ACTIVITIES OF DAILY LIVING (ADL)
ADLS_ACUITY_SCORE: 10
ADLS_ACUITY_SCORE: 10

## 2018-10-19 NOTE — PROGRESS NOTES
"CLINICAL NUTRITION SERVICES    Reason for Assessment:  Low-sodium (2 g/day) nutrition education, received consult.     Diet History:  Per nutrition note 6/2018, \"Pt reports receiving low-sodium nutrition education in the past. Pt known to this service from prior admission. Per chart review, pt and family received nutrition education on 3/7/17. Per chart, medication non-compliance and dietary indiscretion PTA. Per pt's sister, pt lives alone and it is more difficult to eat low-sodium choices. Pt and sister are agreeable to additional nutrition education and they have some questions.\"    Pt was busy with provider and RN when attempting to see.     Nutrition Diagnosis:  Unable to assess    Nutrition Prescription/Recs:  Continue low-sodium diet. Fluid restriction as per team.      Interventions:  Nutrition Education  1. Attempted to provide verbal instruction on a heart-healthy diet with emphasis on low-sodium meal planning. Pt was busy with RN and provider.   2. Left the following handouts: Low-Sodium Foods and Drinks, Tips for a Low-Sodium Diet, and Managing Fluid Restriction.      Goals:    Pt will verbalize at least five high sodium foods and the importance of avoiding added salt to foods for cooking or seasoning foods.     Follow-up:   Patient to ask any further nutrition-related questions before discharge. In addition, pt may request outpatient RD appointment.     Rachel Bradshaw, MS, RD, LD, [facility]   6C Pgr: [phone]       "

## 2018-10-19 NOTE — IP AVS SNAPSHOT
MRN:[id number]                      After Visit Summary   10/19/2018    Danielito Chu    MRN: [id number]           Thank you!     Thank you for choosing [place] for your care. Our goal is always to provide you with excellent care. Hearing back from our patients is one way we can continue to improve our services. Please take a few minutes to complete the written survey that you may receive in the mail after you visit with us. Thank you!        Patient Information     Date Of Birth          1955        Designated Caregiver       Most Recent Value    Caregiver    Will someone help with your care after discharge? yes    Name of designated caregiver Fani    Phone number of caregiver [phone] (cell)    Caregiver address same as patient      About your hospital stay     You were admitted on:  October 19, 2018 You last received care in the:  [address] [facility]    You were discharged on:  October 30, 2018        Reason for your hospital stay       ,  You were admitted with worsening of your known heart failure. We believe this is a progression of your heart failure and that you will likely need an LVAD soon given your right heart catheterization values and your cardiopulmonary stress test as well as recurrent admissions for worsening symptoms related to your heart failure. You underwent work up to eventually receive an LVAD however you were reluctant to do so this admission and wished to explore diet restriction as well as strict adherence to fluid restriction outpatient.   We understand that comiting to an LVAD is a large step. We encourage you to have more discussions with your primary cardiologist regarding this in the future and you will need close follow up in the Cardiology clinic.     In addition, we recognize that having a chronic illness is difficult not only on your body but also your spirit and mind. We started you on a medication while you were inpatient to help with  your mood. We encourage you to follow up with your primary physician regarding ways to cope with your illness moving forward.     Please be sure to weight yourself daily and if you notice an increase in weight by 2-3lbs in 1 day please be sure to call the core clinic.     It was a pleasure caring for you.     Best wishes moving forward.                  Who to Call     For medical emergencies, please call 911.  For non-urgent questions about your medical care, please call your primary care provider or clinic, [phone]          Attending Provider     Provider Specialty    Enoc Lincoln MD Cardiology    Romaine Mondragon MD Internal Medicine       Primary Care Provider Office Phone # Fax #    Bashir Hines [phone]      After Care Instructions     Activity       Your activity upon discharge: activity as tolerated            Diet       Follow this diet upon discharge: Orders Placed This Encounter      Fluid restriction 2000 ML FLUID      Snacks/Supplements Adult: Other; If pt asks for a snack or supplement, please send; With Meals      1 Gram Sodium Diet                  Follow-up Appointments     Adult [facility] Follow-up and recommended labs and tests       1) Follow up in Cardiology clinic within 2 weeks of discharge with labs including BMP and Magnesium     2) Primary care follow up within 2 weeks.     Appointments on [place] and/or [facility] (with [facility] or [facility] provider or service). Call [phone] if you haven't heard regarding these appointments within 7 days of discharge.                  Your next 10 appointments already scheduled     Nov 13, 2018  1:00 PM CST   Lab with  LAB   [facility] ([facility])    [address] [place] [address]  1st [facility] [address] [phone]            Nov 13, 2018  1:30 PM CST   (Arrive by 1:15 PM)   CORE RETURN with Gema Chin NP   [facility] ([facility])    [address]  [facility]  [address] [place] [address] [phone]            Nov 26, 2018  9:45 AM CST   Lab with UC LAB   [facility] ([facility])    [address] [place] [address]  1st [facility] [address]   [phone]            Nov 26, 2018 10:30 AM CST   Implanted Defibulator with Uc Cv Device 1   [facility] ([facility])    [address] [place] [address]  3rd Floor  [address]               Nov 26, 2018 11:00 AM CST   (Arrive by 10:45 AM)   CORE RETURN with Miono Lopez NP   [facility] ([facility])    [address] [place] [address]  [address] [place] [address] [phone]            Jan 03, 2019  9:30 AM CST   (Arrive by 9:15 AM)   RETURN HEART FAILURE with Lorena Watkins MD   [facility])    [address] [place] [address]  [address] [place] [address] [phone]            Jan 21, 2019  1:00 PM CST   (Arrive by 12:45 PM)   Implanted Defibulator with Uc Cv Device 1   [facility] ([facility])    [address] [place] [address] Floor  [id number]                 Additional Services     Cardiac Rehab Referral       If you have not heard from the scheduling office within 2 business days, please call [phone] for all locations, with the exception of [place], please call [phone] and [place], please call [phone].    Please be aware that coverage of these services is subject to the terms and limitations of your health insurance plan.  Call member services at your health plan with any benefit or coverage questions.            Medication Therapy Management Referral       MTM referral reason            Patient has 5 PTA or Discharge Medications AND one of the following   diagnoses: DM,HF,COPD,AMI DX,PULM HTN       This service is designed to help you get the most from your medications.  A specially trained pharmacist will work  closely with you and your doctors  to solve any problems related to your medications and to help you get the   best results from taking them.      The Medication Therapy Management staff will call you to schedule an appointment.                  General Recommendations To Control Heart Failure When You Get Home     Heart Failure Instructions for Patients and Families: Please read and check off each of these important instructions as you do them when you get home.     Weight and Symptoms    ___ Put a scale in your bathroom.    ___ Post a weight chart or calendar next to your scale.    ___ Weigh yourself everyday as soon as you get up in the morning (before breakfast).  You should only be wearing your pajamas.  Write your weight on the chart/calendar.    ___ Bring your weight chart/calendar with you to all appointments.    ___ Call your doctor or nurse practitioner if you gain 2 pounds (in 1 day) or 5 pounds in (1 week) from your goal  good  weight.  Your good weight is also called your  dry  weight.  Your doctor or nurse will tell you what your good weight should be.    ___ Call your doctor or nurse practitioner if you have shortness of breath that gets worse over time, leg swelling or fatigue.    Medications and Diet    ___ Make sure to take your medication as prescribed.    ___ Bring a current list of your medication and all of your medicine bottles with you to all appointments.    ___ Limit fluids if you still have swelling or shortness of breath, or if your doctor tells you to do so.      ___ Eat less than 2000 mg of sodium (salt) every day. Read food labels, and do not add salt to meals. Remember, if you eat less salt you retain less fluid.    ___ Follow a heart healthy diet that is low in saturated fat.         Activity and Suggested Lifestyle Changes   ___ Stay active. Talk to your doctor about an exercise program that is safe for your heart.    ___ Stop smoking. Reduce alcohol use.      ___ Lose weight if you  are overweight. Extra weight puts a lot of stress on the heart.    Control for Leg Swelling  ___ Keep your legs elevated (raised) as needed for swelling. If swelling is uncomfortable or elevation doesn t help, ask your doctor about using ACE wraps or support stockings.      What is the C.O.R.E. Clinic?     Cardiomyopathy  Optimization  Rehabilitation  Education    The C.O.R.E. Clinic is a heart failure specialty clinic within [facility].  It is an outpatient disease management program that is based on a phase-by-phase approach, which is tailored to each patient s individual needs.  The cardiologist, nurse practitioner, physician assistant and nurses provide an ongoing outpatient care and treatment plan that guides heart failure and cardiomyopathy patients from evaluation and education to stabilization. This team works with your current primary care doctor and cardiologist to help you:      Avoid hospitalizations    Slow the progression of the disease    Improve length and quality of life    Know who and when to call if heart failure symptoms appear    Receive easy access to quality health care and advice    Better understand your condition and treatment    Decrease the tremendous cost burden of heart failure care    Detect future heart problems before they become life threatening    Your C.O.R.E. Clinic Team will continue to educate you on your heart failure and may adjust medications based on your vital signs, lab work, and how you are feeling.  Therefore, it is very important to bring the following to all C.O.R.E. appointments:    - An accurate list of your medications  - Your medicine bottles  - Your weight chart/calendar    [facility]):    [phone]  [facility]):    [phone]  [facility]):  [phone]        Pending Results     Date and Time Order Name Status Description    10/25/2018 1901 Potassium In  "process     10/19/2018 1419 T4 free In process             Statement of Approval     Ordered          10/30/18 1308  I have reviewed and agree with all the recommendations and orders detailed in this document.  EFFECTIVE NOW     Approved and electronically signed by:  Romaine Mondragon MD             Admission Information     Date & Time Provider Department Dept. Phone    10/19/2018 Romaine Mondragon MD [address] [facility] [phone]      Your Vitals Were     Blood Pressure Pulse Temperature Respirations Height Weight    102/85 (BP Location: Left arm) 93 97.5  F (36.4  C) (Oral) 18 1.753 m (5' 9\") 83.9 kg (185 lb)    Pulse Oximetry BMI (Body Mass Index)                98% 27.32 kg/m2          MyChart Information     [employer] lets you send messages to your doctor, view your test results, renew your prescriptions, schedule appointments and more. To sign up, go to [email or web address] . Click on \"Log in\" on the left side of the screen, which will take you to the Welcome page. Then click on \"Sign up Now\" on the right side of the page.     You will be asked to enter the access code listed below, as well as some personal information. Please follow the directions to create your username and password.     Your access code is: [email or web address]  Expires: 2018  6:31 AM     Your access code will  in 90 days. If you need help or a new code, please call your [place] clinic or [phone].        Care EveryWhere ID     This is your Care EveryWhere ID. This could be used by other organizations to access your [place] medical records  [id number]        Equal Access to Services     [facility] Center[facility] AH: Hadii andres Tsai, joshuada becky, qaybta aaron lowe. So [facility] [phone].    ATENCIÓN: Si habla español, tiene a stokes disposición servicios gratuitos de asistencia lingüística. Llame al [phone].    We comply with applicable federal civil rights laws and [place] " laws. We do not discriminate on the basis of race, color, national origin, age, disability, sex, sexual orientation, or gender identity.               Review of your medicines      START taking        Dose / Directions    artificial saliva Liqd liquid   Used for:  Acute on chronic systolic congestive heart failure (H)        Dose:  10 mL   Swish and spit 10 mLs in mouth 4 times daily as needed for dry mouth   Quantity:  59 mL   Refills:  0       buPROPion 150 MG 24 hr tablet   Commonly known as:  WELLBUTRIN XL   Used for:  Depression, unspecified depression type        Dose:  150 mg   Take 1 tablet (150 mg) by mouth daily   Quantity:  60 tablet   Refills:  0       captopril 12.5 MG tablet   Commonly known as:  CAPOTEN   Used for:  Acute systolic congestive heart failure (H)        Dose:  6.25 mg   Take 0.5 tablets (6.25 mg) by mouth 3 times daily   Quantity:  90 tablet   Refills:  0       Potassium Chloride ER 20 MEQ Tbcr   Used for:  Acute on chronic systolic congestive heart failure (H)        Dose:  20 mEq   Take 1 tablet (20 mEq) by mouth daily   Quantity:  90 tablet   Refills:  1       thiamine 100 MG tablet        Dose:  100 mg   Take 1 tablet (100 mg) by mouth daily   Quantity:  60 tablet   Refills:  0         CONTINUE these medicines which may have CHANGED, or have new prescriptions. If we are uncertain of the size of tablets/capsules you have at home, strength may be listed as something that might have changed.        Dose / Directions    torsemide 20 MG tablet   Commonly known as:  DEMADEX   This may have changed:    - when to take this  - additional instructions   Used for:  Acute on chronic systolic congestive heart failure (H)        Dose:  60 mg   Take 3 tablets (60 mg) by mouth 2 times daily   Quantity:  180 tablet   Refills:  0         CONTINUE these medicines which have NOT CHANGED        Dose / Directions    aspirin 81 MG EC tablet   Used for:  Acute systolic congestive heart failure (H)         Dose:  81 mg   Take 1 tablet (81 mg) by mouth daily   Quantity:  30 tablet   Refills:  3       atorvastatin 10 MG tablet   Commonly known as:  LIPITOR   Used for:  Acute systolic congestive heart failure (H)        Dose:  10 mg   Take 1 tablet (10 mg) by mouth daily   Quantity:  90 tablet   Refills:  3       digoxin 250 MCG tablet   Commonly known as:  LANOXIN        TAKE 1 TABLET (250 MCG) BY MOUTH DAILY   Quantity:  90 tablet   Refills:  3       metFORMIN 500 MG tablet   Commonly known as:  GLUCOPHAGE   Used for:  Chronic systolic heart failure (H), Acute systolic congestive heart failure (H)        Dose:  500 mg   Take 1 tablet (500 mg) by mouth 2 times daily (with meals)   Refills:  0       sildenafil 50 MG tablet   Commonly known as:  VIAGRA   Used for:  Drug-induced impotence        Dose:  50 mg   Take 1 tablet (50 mg) by mouth daily as needed   Quantity:  20 tablet   Refills:  1       traZODone 100 MG tablet   Commonly known as:  DESYREL   Used for:  Insomnia, unspecified type        TAKE 1 TABLET BY MOUTH AT BEDTIME AS NEEDED FOR SLEEP   Quantity:  30 tablet   Refills:  1         STOP taking     metoprolol succinate 25 MG 24 hr tablet   Commonly known as:  TOPROL-XL           potassium chloride SA 20 MEQ CR tablet   Commonly known as:  K-DUR/KLOR-CON M           sacubitril-valsartan 24-26 MG per tablet   Commonly known as:  ENTRESTO                Where to get your medicines      These medications were sent to [facility] [place], [place] - [address] [address]     Phone:  [phone]     artificial saliva Liqd liquid    buPROPion 150 MG 24 hr tablet    captopril 12.5 MG tablet    Potassium Chloride ER 20 MEQ Tbcr    thiamine 100 MG tablet    torsemide 20 MG tablet                Protect others around you: Learn how to safely use, store and throw away your medicines at www.disposemymeds.org.             Medication List: This is a list of all your  medications and when to take them. Check marks below indicate your daily home schedule. Keep this list as a reference.      Medications           Morning Afternoon Evening Bedtime As Needed    artificial saliva Liqd liquid   Swish and spit 10 mLs in mouth 4 times daily as needed for dry mouth   Last time this was given:  10 mLs on 10/30/2018  8:52 AM                                aspirin 81 MG EC tablet   Take 1 tablet (81 mg) by mouth daily   Last time this was given:  81 mg on 10/30/2018  8:50 AM                                atorvastatin 10 MG tablet   Commonly known as:  LIPITOR   Take 1 tablet (10 mg) by mouth daily   Last time this was given:  10 mg on 10/29/2018  8:39 PM                                buPROPion 150 MG 24 hr tablet   Commonly known as:  WELLBUTRIN XL   Take 1 tablet (150 mg) by mouth daily   Last time this was given:  150 mg on 10/30/2018  8:51 AM                                captopril 12.5 MG tablet   Commonly known as:  CAPOTEN   Take 0.5 tablets (6.25 mg) by mouth 3 times daily   Last time this was given:  6.25 mg on 10/30/2018  8:50 AM                                digoxin 250 MCG tablet   Commonly known as:  LANOXIN   TAKE 1 TABLET (250 MCG) BY MOUTH DAILY   Last time this was given:  250 mcg on 10/30/2018  8:50 AM                                metFORMIN 500 MG tablet   Commonly known as:  GLUCOPHAGE   Take 1 tablet (500 mg) by mouth 2 times daily (with meals)                                Potassium Chloride ER 20 MEQ Tbcr   Take 1 tablet (20 mEq) by mouth daily                                sildenafil 50 MG tablet   Commonly known as:  VIAGRA   Take 1 tablet (50 mg) by mouth daily as needed                                thiamine 100 MG tablet   Take 1 tablet (100 mg) by mouth daily   Last time this was given:  100 mg on 10/30/2018  8:50 AM                                torsemide 20 MG tablet   Commonly known as:  DEMADEX   Take 3 tablets (60 mg) by mouth 2 times daily   Last  time this was given:  60 mg on 10/30/2018  8:51 AM                                traZODone 100 MG tablet   Commonly known as:  DESYREL   TAKE 1 TABLET BY MOUTH AT BEDTIME AS NEEDED FOR SLEEP   Last time this was given:  50 mg on 10/29/2018 10:45 PM

## 2018-10-19 NOTE — IP AVS SNAPSHOT
[address] [address]    Phone:  [phone]                                       After Visit Summary   10/19/2018    Danielito Chu    MRN: [id number]           After Visit Summary Signature Page     I have received my discharge instructions, and my questions have been answered. I have discussed any challenges I see with this plan with the nurse or doctor.    ..........................................................................................................................................  Patient/Patient Representative Signature      ..........................................................................................................................................  Patient Representative Print Name and Relationship to Patient    ..................................................               ................................................  Date                                   Time    ..........................................................................................................................................  Reviewed by Signature/Title    ...................................................              ..............................................  Date                                               Time          22EPIC Rev 08/18

## 2018-10-19 NOTE — LETTER
Transition Communication Hand-off for Care Transitions to Next Level of Care Provider    Name: Danielito Chu  : 1955  MRN #: [id number]  Primary Care Provider: KEN WALDEN     Primary Clinic: [address] [address]     Reason for Hospitalization:  CHF (congestive heart failure), NYHA class IV, acute on chronic, combined (H) [I50.43]  Admit Date/Time: 10/19/2018  1:23 PM  Discharge Date: 10/30/18  Payor Source: Payor: [employer] / Plan: [employer] HMO / Product Type: PPO   Readmission Assessment Measure (ANNABELLE) Risk Score/category: elevated.   Reason for Communication Hand-off Referral: Multiple providers/specialties  Discharge Plan:   Concern for non-adherence with plan of care: no  Discharge Needs Assessment:  Needs       Most Recent Value    Anticipated Changes Related to Illness none    Equipment Currently Used at Home none      Follow-up specialty is recommended: Yes    Follow-up plan:  Future Appointments  Date Time Provider Department Center   2018 1:00 PM  LAB [facility]   2018 1:30 PM Gema Chin NP [facility]   2018 9:45 AM UC LAB [facility]   2018 10:30 AM 1, Uc Cv Device [facility]   2018 11:00 AM Minoo Lopez NP [facility]   1/3/2019 9:30 AM Lorena Watkins MD [facility]   2019 1:00 PM 1, Uc Cv Device [facility]       Any outstanding tests or procedures:        Referrals     Future Labs/Procedures    Cardiac Rehab Referral     Process Instructions:    Advance to Wellness and Exercise for Life (WEL) Program or to another maintenance exercise program upon completion of supervised exercise therapy.    Weight mgt program is only offered at [facility].    *This therapy referral will be filtered to a centralized scheduling office at [facility] and the patient will receive a call to schedule an appointment at a [place] location most convenient for them. *        Comments:    If  you have not heard from the scheduling office within 2 business days, please call [phone] for all locations, with the exception of [place], please call [phone] and [facility] [place], please call [phone].    Please be aware that coverage of these services is subject to the terms and limitations of your health insurance plan.  Call member services at your health plan with any benefit or coverage questions.    Medication Therapy Management Referral     Comments:    MTM referral reason            Patient has 5 PTA or Discharge Medications AND one of the following   diagnoses: DM,HF,COPD,AMI DX,PULM HTN       This service is designed to help you get the most from your medications.  A specially trained pharmacist will work closely with you and your doctors  to solve any problems related to your medications and to help you get the   best results from taking them.      The Medication Therapy Management staff will call you to schedule an appointment.            Key Recommendations:  Post hospitalization follow up.     GILBERT SOTO RN CC

## 2018-10-19 NOTE — H&P
History and Physical  Danielito Chu MRN: [id number]  Age: 63 year old, : 1955  Primary care provider: Bashir Hines           Chief Complaint:     Decompensated HF           History of Present Illness:     HISTORY OF PRESENT ILLNESS:  Danielito Chu is a 63 year old with PMH of HFrEF 2/2 NICM (EF 15%) , s/p ICD, HTN , DM II, SHIRLEY who is presenting for decompensated HF.    Patient reports that over the past 6 months he has been struggling  with the diagnosis of HF. He has been staying up late , eating things he knows he shouldn't be eating. He has been having a hard social siutuation from dealing with his partner, insurance , and employment.     He also reports that he has been feeling SOB - he still has good exercise tolerance he reports. He feels that his weight is near his dry weight which is ~200 lb. Patient was transferred to the [facility] for evaluation for decompensated HF and expiated HF work up.    Off note patient follows up with Dr. Watkins , he had a RHC 2018 that showed RA 20 and PAW 25, CI 1.3. He had a cardiopulmonary stress testing that showed MVo2 9.3 ml/kg/min.             Past Medical History:     Reviewed with patient on 10/19/2018   Past Medical History:   Diagnosis Date     Acute systolic congestive heart failure (H) 2017     Diabetes (H)      HTN (hypertension)      Hyperlipidemia      Mitral regurgitation      Nocturnal oxygen desaturation 3/29/2018     Nonischemic cardiomyopathy (H) 2017     SHIRLEY (obstructive sleep apnea)      Pacemaker 2017    ICD 17       No past surgical history on file.          Social History:     Social History   Substance Use Topics     Smoking status: Former Smoker     Smokeless tobacco: Never Used      Comment: quit 3/2017     Alcohol use Not on file      Comment: social              Family History:     Family History   Problem Relation Age of Onset     Heart Failure Father       at age 86     Heart Failure Paternal  Uncle       at 66      Myocardial Infarction Paternal Uncle       at age 62     Coronary Artery Disease Mother       during CABG at age 41             Allergies:     No Known Allergies         ROS : 10 point review of systems was performed and was negative other than HPI         Medications:     PTA Meds  Prior to Admission medications    Medication Sig Last Dose Taking? Auth Provider   aspirin EC 81 MG EC tablet Take 1 tablet (81 mg) by mouth daily   Bennett Rene MD   atorvastatin (LIPITOR) 10 MG tablet Take 1 tablet (10 mg) by mouth daily   Lorena Watkins MD   digoxin (LANOXIN) 250 MCG tablet TAKE 1 TABLET (250 MCG) BY MOUTH DAILY   Lorena Watkins MD   metFORMIN (GLUCOPHAGE) 500 MG tablet Take 1 tablet (500 mg) by mouth 2 times daily (with meals)   Desyi Mattson APRN CNP   metoprolol succinate (TOPROL-XL) 25 MG 24 hr tablet Take 1 tablet (25 mg) by mouth daily   Gema Chin NP   potassium chloride SA (K-DUR/KLOR-CON M) 20 MEQ CR tablet Take half a tablet once a day.   Kalli Purcell APRN CNP   sacubitril-valsartan (ENTRESTO) 24-26 MG per tablet Take 1 tablet by mouth 2 times daily   Gema Chin NP   sildenafil (VIAGRA) 50 MG tablet Take 1 tablet (50 mg) by mouth daily as needed  Patient not taking: Reported on 10/16/2018   Lorena Watkins MD   torsemide (DEMADEX) 20 MG tablet Take 3 tablets (60 mg) by mouth 2 times daily Take torsemide 60 mg in the am and 40 mg in the pm   Rosmery Fuchs APRN CNP   traZODone (DESYREL) 100 MG tablet TAKE 1 TABLET BY MOUTH AT BEDTIME AS NEEDED FOR SLEEP   Lorena Watkins MD      Current Meds    Infusion Meds      ALLERGIES:    No Known Allergies              Physical Exam:     B/P: 86/57, T: 96.4, P: Data Unavailable, R: 16    Wt Readings from Last 4 Encounters:   10/17/18 93.3 kg (205 lb 11.2 oz)   10/16/18 91.6 kg (202 lb)   18 90.7 kg (200 lb)   18 92.9 kg (204 lb 12.8 oz)       General: NAD,  appears stated age,pleasant and cooperative, AAOx3.  HEENT: NC/AT, well injected Conjunctiva, anicteric sclera, EOMI; PERRL  Neck: Trachea midline; supple, good tone; JVD ~15 mmhg  Chest: CTAB B/L ; no rales   CV: RRR; nl S1/S2, no murmurs  Abd: Soft, NT/ND, no CVA tenderness  Ext: Warm/well perfused; no Edema.  Skin: Clear; unbroken; no new rashes  Neuro: - MS: AAO x3 - no focal deficit             Data:       DIAGNOSTIC STUDIES  ROUTINE ICU LABS (Last four results)  CMP    Recent Labs  Lab 10/19/18  1419 10/16/18  1338    136   POTASSIUM 3.9 4.3   CHLORIDE 101 100   CO2 29 30   ANIONGAP 8 6   * 126*   BUN 27 32*   CR 1.35* 1.51*   GFRESTIMATED 53* 47*   GFRESTBLACK 65 57*   ASHLEE 8.7 9.2   MAG 2.1  --    PROTTOTAL 7.4  --    ALBUMIN 3.5  --    BILITOTAL 2.2*  --    ALKPHOS 78  --    AST 14  --    ALT 11  --      CBC    Recent Labs  Lab 10/19/18  1419   WBC 5.4   RBC 5.42   HGB 16.8   HCT 53.2*   MCV 98   MCH 31.0   MCHC 31.6   RDW 16.1*        INR    Recent Labs  Lab 10/19/18  1419   INR 1.39*     Arterial Blood GasNo lab results found in last 7 days.      IMAGING  CXR : dilated heart - pulmonary congestion     Echo TTE 10/17:  Severe left ventricular dilation is present.  Severe diffuse hypokinesis is present.  The Ejection Fraction is estimated at 10-15%.  Mild to moderate right ventricular dilation is present.  Global right ventricular function is mildly reduced.  Severe right atrial enlargement is present.  Severe tricuspid insufficiency is present.  Pulmonary artery systolic pressure cannot be assessed.  Dilation of the inferior vena cava is present with abnormal respiratory  variation in diameter.  No pericardial effusion is present.  Worsened LV function and TR since lasr srudy      10/16/18: NSVT - no alarms     RHC 6/2018:      Vo2 6/11/2018:      Assessment and Plan:     Danielito Chu is a 63 year old with PMH of HFrEF 2/2 NICM (EF 15%) , s/p ICD, HTN , DM II, SHIRLEY who is  presenting for decompensated HF and expedited VAD/Transplant work up.    #Decompensated HFrEF 2/2 NICM (EF 15%)   #S/P ICD   Likely related to non-medical adherence in the past couple of months. Patient initially diagnosed in 2017 - he did well for 7-8 months however started decompensating over the past couple of months.   RHC in 6/2018 showed elevated filling pressures and cardiopulmonary testing showed MVo2 9.3 (pred 30.4) suggestive of poor prognosis.   Will optimize medically , repeat RHC after diuresis and start VAD/Transplant work up.     - Diuresis: IV Bumex 4 mg once  - BB: Hold Metoprolol 25 mg-XL   - ACE/ARB: Entresto BID   - Digoxin 250 mcg qday   - Atorvastatin 10 mg Qday  - Aspirin 81 mg qday   - Will start transplant work up     #Depression / Adjustment disorder   - Start wellbutrin 150 mg qday   - Will consider psychiatry/psychology consult     #NSVT   #s/p ICD   Noted on most recent interrogation of ICD  - K>4, Mg>2     #DM II   - sliding scale insulin     #SHIRLEY   - CPAP     #HTN  Entristo, BB as above.       Prophylaxis:  DVT: Ambulate   GI: None  Family: Not updated  Disposition: ~5 day hospital stay     Code Status: Full    Patient was seen and discussed with attending physician Dr. Mondragon, who agrees with above assessment and plan.    Harriett Dailey MD  Internal Medicine, PGY-3  Pager [phone]

## 2018-10-20 ENCOUNTER — APPOINTMENT (OUTPATIENT)
Dept: OCCUPATIONAL THERAPY | Facility: CLINIC | Age: 63
DRG: 287 | End: 2018-10-20
Attending: INTERNAL MEDICINE
Payer: COMMERCIAL

## 2018-10-20 ENCOUNTER — APPOINTMENT (OUTPATIENT)
Dept: GENERAL RADIOLOGY | Facility: CLINIC | Age: 63
DRG: 287 | End: 2018-10-20
Attending: STUDENT IN AN ORGANIZED HEALTH CARE EDUCATION/TRAINING PROGRAM
Payer: COMMERCIAL

## 2018-10-20 LAB
ANION GAP SERPL CALCULATED.3IONS-SCNC: 10 MMOL/L (ref 3–14)
ANION GAP SERPL CALCULATED.3IONS-SCNC: 11 MMOL/L (ref 3–14)
BUN SERPL-MCNC: 31 MG/DL (ref 7–30)
BUN SERPL-MCNC: 32 MG/DL (ref 7–30)
CALCIUM SERPL-MCNC: 8.7 MG/DL (ref 8.5–10.1)
CALCIUM SERPL-MCNC: 9.3 MG/DL (ref 8.5–10.1)
CHLORIDE SERPL-SCNC: 98 MMOL/L (ref 94–109)
CHLORIDE SERPL-SCNC: 99 MMOL/L (ref 94–109)
CO2 SERPL-SCNC: 26 MMOL/L (ref 20–32)
CO2 SERPL-SCNC: 28 MMOL/L (ref 20–32)
CREAT SERPL-MCNC: 1.3 MG/DL (ref 0.66–1.25)
CREAT SERPL-MCNC: 1.41 MG/DL (ref 0.66–1.25)
GFR SERPL CREATININE-BSD FRML MDRD: 51 ML/MIN/1.7M2
GFR SERPL CREATININE-BSD FRML MDRD: 56 ML/MIN/1.7M2
GLUCOSE BLDC GLUCOMTR-MCNC: 115 MG/DL (ref 70–99)
GLUCOSE BLDC GLUCOMTR-MCNC: 88 MG/DL (ref 70–99)
GLUCOSE SERPL-MCNC: 118 MG/DL (ref 70–99)
GLUCOSE SERPL-MCNC: 133 MG/DL (ref 70–99)
MAGNESIUM SERPL-MCNC: 2.2 MG/DL (ref 1.6–2.3)
MAGNESIUM SERPL-MCNC: 2.4 MG/DL (ref 1.6–2.3)
POTASSIUM SERPL-SCNC: 4.5 MMOL/L (ref 3.4–5.3)
POTASSIUM SERPL-SCNC: 4.5 MMOL/L (ref 3.4–5.3)
SODIUM SERPL-SCNC: 135 MMOL/L (ref 133–144)
SODIUM SERPL-SCNC: 138 MMOL/L (ref 133–144)

## 2018-10-20 PROCEDURE — 25000128 H RX IP 250 OP 636: Performed by: INTERNAL MEDICINE

## 2018-10-20 PROCEDURE — 25000132 ZZH RX MED GY IP 250 OP 250 PS 637: Performed by: STUDENT IN AN ORGANIZED HEALTH CARE EDUCATION/TRAINING PROGRAM

## 2018-10-20 PROCEDURE — 36415 COLL VENOUS BLD VENIPUNCTURE: CPT | Performed by: INTERNAL MEDICINE

## 2018-10-20 PROCEDURE — 97110 THERAPEUTIC EXERCISES: CPT | Mod: GO

## 2018-10-20 PROCEDURE — 36569 INSJ PICC 5 YR+ W/O IMAGING: CPT

## 2018-10-20 PROCEDURE — 36592 COLLECT BLOOD FROM PICC: CPT | Performed by: STUDENT IN AN ORGANIZED HEALTH CARE EDUCATION/TRAINING PROGRAM

## 2018-10-20 PROCEDURE — 25000128 H RX IP 250 OP 636: Performed by: STUDENT IN AN ORGANIZED HEALTH CARE EDUCATION/TRAINING PROGRAM

## 2018-10-20 PROCEDURE — 25000125 ZZHC RX 250: Performed by: STUDENT IN AN ORGANIZED HEALTH CARE EDUCATION/TRAINING PROGRAM

## 2018-10-20 PROCEDURE — 80048 BASIC METABOLIC PNL TOTAL CA: CPT | Performed by: INTERNAL MEDICINE

## 2018-10-20 PROCEDURE — 00000146 ZZHCL STATISTIC GLUCOSE BY METER IP

## 2018-10-20 PROCEDURE — 83735 ASSAY OF MAGNESIUM: CPT | Performed by: STUDENT IN AN ORGANIZED HEALTH CARE EDUCATION/TRAINING PROGRAM

## 2018-10-20 PROCEDURE — C1769 GUIDE WIRE: HCPCS

## 2018-10-20 PROCEDURE — 83735 ASSAY OF MAGNESIUM: CPT | Performed by: INTERNAL MEDICINE

## 2018-10-20 PROCEDURE — 80048 BASIC METABOLIC PNL TOTAL CA: CPT | Performed by: STUDENT IN AN ORGANIZED HEALTH CARE EDUCATION/TRAINING PROGRAM

## 2018-10-20 PROCEDURE — 40000133 ZZH STATISTIC OT WARD VISIT

## 2018-10-20 PROCEDURE — 99233 SBSQ HOSP IP/OBS HIGH 50: CPT | Mod: GC | Performed by: INTERNAL MEDICINE

## 2018-10-20 PROCEDURE — C1751 CATH, INF, PER/CENT/MIDLINE: HCPCS

## 2018-10-20 PROCEDURE — 97165 OT EVAL LOW COMPLEX 30 MIN: CPT | Mod: GO

## 2018-10-20 PROCEDURE — 21400006 ZZH R&B CCU INTERMEDIATE UMMC

## 2018-10-20 PROCEDURE — 71045 X-RAY EXAM CHEST 1 VIEW: CPT

## 2018-10-20 RX ORDER — BUMETANIDE 0.25 MG/ML
4 INJECTION INTRAMUSCULAR; INTRAVENOUS EVERY 6 HOURS
Status: DISCONTINUED | OUTPATIENT
Start: 2018-10-20 | End: 2018-10-20

## 2018-10-20 RX ORDER — FUROSEMIDE 10 MG/ML
160 INJECTION INTRAMUSCULAR; INTRAVENOUS ONCE
Status: COMPLETED | OUTPATIENT
Start: 2018-10-20 | End: 2018-10-20

## 2018-10-20 RX ORDER — DOBUTAMINE HYDROCHLORIDE 200 MG/100ML
2.5 INJECTION INTRAVENOUS CONTINUOUS
Status: DISCONTINUED | OUTPATIENT
Start: 2018-10-20 | End: 2018-10-20

## 2018-10-20 RX ORDER — PANTOPRAZOLE SODIUM 40 MG/1
40 TABLET, DELAYED RELEASE ORAL
Status: DISCONTINUED | OUTPATIENT
Start: 2018-10-21 | End: 2018-10-20

## 2018-10-20 RX ORDER — LIDOCAINE 40 MG/G
CREAM TOPICAL
Status: DISCONTINUED | OUTPATIENT
Start: 2018-10-20 | End: 2018-10-30

## 2018-10-20 RX ORDER — FUROSEMIDE 10 MG/ML
120 INJECTION INTRAMUSCULAR; INTRAVENOUS ONCE
Status: DISCONTINUED | OUTPATIENT
Start: 2018-10-20 | End: 2018-10-20

## 2018-10-20 RX ORDER — HEPARIN SODIUM,PORCINE 10 UNIT/ML
2-5 VIAL (ML) INTRAVENOUS
Status: COMPLETED | OUTPATIENT
Start: 2018-10-20 | End: 2018-10-20

## 2018-10-20 RX ORDER — LIDOCAINE 40 MG/G
CREAM TOPICAL
Status: DISCONTINUED | OUTPATIENT
Start: 2018-10-20 | End: 2018-10-30 | Stop reason: HOSPADM

## 2018-10-20 RX ORDER — BUMETANIDE 0.25 MG/ML
0.5 INJECTION INTRAMUSCULAR; INTRAVENOUS ONCE
Status: DISCONTINUED | OUTPATIENT
Start: 2018-10-20 | End: 2018-10-20

## 2018-10-20 RX ADMIN — FUROSEMIDE 160 MG: 10 INJECTION, SOLUTION INTRAVENOUS at 22:29

## 2018-10-20 RX ADMIN — BUPROPION HYDROCHLORIDE 150 MG: 150 TABLET, FILM COATED, EXTENDED RELEASE ORAL at 09:45

## 2018-10-20 RX ADMIN — Medication 5 ML: at 18:47

## 2018-10-20 RX ADMIN — TRAZODONE HYDROCHLORIDE 50 MG: 50 TABLET ORAL at 00:28

## 2018-10-20 RX ADMIN — RANITIDINE 150 MG: 150 TABLET, FILM COATED ORAL at 19:00

## 2018-10-20 RX ADMIN — SIMETHICONE CHEW TAB 80 MG 80 MG: 80 TABLET ORAL at 12:12

## 2018-10-20 RX ADMIN — ATORVASTATIN CALCIUM 10 MG: 10 TABLET, FILM COATED ORAL at 22:28

## 2018-10-20 RX ADMIN — DIGOXIN 250 MCG: 125 TABLET ORAL at 09:45

## 2018-10-20 RX ADMIN — ASPIRIN 81 MG: 81 TABLET, COATED ORAL at 09:45

## 2018-10-20 RX ADMIN — LIDOCAINE HYDROCHLORIDE 3.5 ML: 10 INJECTION, SOLUTION INFILTRATION; PERINEURAL at 15:16

## 2018-10-20 RX ADMIN — BUMETANIDE 4 MG: 0.25 INJECTION, SOLUTION INTRAMUSCULAR; INTRAVENOUS at 15:29

## 2018-10-20 ASSESSMENT — ACTIVITIES OF DAILY LIVING (ADL)
ADLS_ACUITY_SCORE: 10
PREVIOUS_RESPONSIBILITIES: MEAL PREP;HOUSEKEEPING;LAUNDRY;SHOPPING;YARDWORK;MEDICATION MANAGEMENT;FINANCES;DRIVING
ADLS_ACUITY_SCORE: 10
ADLS_ACUITY_SCORE: 10

## 2018-10-20 NOTE — PROGRESS NOTES
10/20/18 0955   Quick Adds   Type of Visit Initial Occupational Therapy Evaluation   Living Environment   Lives With alone   Living Arrangements house   Home Accessibility bed and bath on same level;tub/shower is not walk in;stairs to enter home;stairs within home   Number of Stairs to Enter Home 2   Number of Stairs Within Home 24   Stair Railings at Home inside, present on left side   Self-Care   Dominant Hand right   Usual Activity Tolerance good   Current Activity Tolerance moderate   Regular Exercise no   Equipment Currently Used at Home none   Functional Level Prior   Ambulation 0-->independent   Transferring 0-->independent   Toileting 0-->independent   Bathing 0-->independent   Dressing 0-->independent   Eating 0-->independent   Communication 0-->understands/communicates without difficulty   Swallowing 0-->swallows foods/liquids without difficulty   Cognition 0 - no cognition issues reported   Fall history within last six months no   Number of times patient has fallen within last six months 0   Which of the above functional risks had a recent onset or change? none   Prior Functional Level Comment no change   General Information   Onset of Illness/Injury or Date of Surgery - Date 10/19/18   Referring Physician Romaine Mondragon MD   Patient/Family Goals Statement To go home   Additional Occupational Profile Info/Pertinent History of Current Problem Danielito Chu is a 63 year old with PMH of HFrEF 2/2 NICM (EF 15%) , s/p ICD, HTN , DM II, SHIRLEY who is presenting for decompensated HF.   Precautions/Limitations no known precautions/limitations   Weight-Bearing Status - LUE full weight-bearing   Weight-Bearing Status - RUE full weight-bearing   Weight-Bearing Status - LLE full weight-bearing   Weight-Bearing Status - RLE full weight-bearing   Heart Disease Risk Factors Lack of physical activity;Stress;Gender;Diabetes;High blood pressure   General Observations Pt supine in bed on approach   General Info Comments  Up ad moshe   Cognitive Status Examination   Orientation orientation to person, place and time   Level of Consciousness alert   Able to Follow Commands WNL/WFL   Personal Safety (Cognitive) WNL/WFL   Memory intact   Attention No deficits were identified   Organization/Problem Solving No deficits were identified   Executive Function No deficits were identified   Visual Perception   Visual Perception No deficits were identified   Sensory Examination   Sensory Quick Adds No deficits were identified   Pain Assessment   Patient Currently in Pain No   Integumentary/Edema   Integumentary/Edema no deficits were identifed   Posture   Posture not impaired   Range of Motion (ROM)   ROM Quick Adds No deficits were identified   Strength   Manual Muscle Testing Quick Adds No deficits were identified   Hand Strength   Hand Strength Comments Bilateral  strength WNL   Muscle Tone Assessment   Muscle Tone Quick Adds No deficits were identified   Coordination   Upper Extremity Coordination No deficits were identified   Mobility   Bed Mobility Bed mobility skill: Supine to sit   Bed Mobility Skill: Supine to Sit   Level of [place]: Supine/Sit independent   Transfer Skills   Transfer Transfer Skill: Stand to Sit   Transfer Skill: Bed to Chair/Chair to Bed   Level of [place]: Bed to Chair independent   Transfer Skill: Sit to Stand   Level of [place]: Sit/Stand independent   Toilet Transfer   Toilet Transfer Toilet Transfer Skill   Transfer Skill: Toilet Transfer   Level of [place]: Toilet independent   Tub/Shower Transfer   Tub/Shower Transfer Transfer Skill: Tub/Shower Transfers   Transfer Skill: Tub/Shower Transfer   Level of [place]: Tub/Shower independent   Balance   Balance Quick Add No deficits identified   Bathing   Level of [place] independent   Upper Body Dressing   Level of [place]: Dress Upper Body independent   Lower Body Dressing   Level of [place]: Dress Lower Body independent  "  Toileting   Level of [place]: Toilet independent   Grooming   Level of [place]: Grooming independent   Eating/Self Feeding   Level of [place]: Eating independent   Instrumental Activities of Daily Living (IADL)   Previous Responsibilities meal prep;housekeeping;laundry;shopping;yardwork;medication management;finances;driving   IADL Comments Pt reports minor fatigue with heavier IADL tasks   Activities of Daily Living Analysis   ADL Comments Pt IND in ADLs   General Therapy Interventions   Planned Therapy Interventions (CR: Aerobic exercise)   Intervention Comments Ambulation, Nu-Step, Weights   Clinical Impression   Criteria for Skilled Therapeutic Interventions Met yes, treatment indicated  (for CR)   OT Diagnosis decreased tolerance for ADLs/IADLs   Influenced by the following impairments deconditioning    Assessment of Occupational Performance 1-3 Performance Deficits   Identified Performance Deficits Cooking, yardwork   Clinical Decision Making (Complexity) Low complexity   Therapy Frequency daily   Predicted Duration of Therapy Intervention (days/wks) 2 weeks   Anticipated Equipment Needs at Discharge shower chair  (Pt expressed interest)   Anticipated Discharge Disposition Home  (with OP CR)   Risks and Benefits of Treatment have been explained. Yes   Patient, Family & other staff in agreement with plan of care Yes   [facility] AM-PAC  \"6 Clicks\" Daily Activity Inpatient Short Form   1. Putting on and taking off regular lower body clothing? 4 - None   2. Bathing (including washing, rinsing, drying)? 4 - None   3. Toileting, which includes using toilet, bedpan or urinal? 4 - None   4. Putting on and taking off regular upper body clothing? 4 - None   5. Taking care of personal grooming such as brushing teeth? 4 - None   6. Eating meals? 4 - None   Daily Activity Raw Score (Score out of 24.Lower scores equate to lower levels of function) 24   Total Evaluation Time   Total Evaluation Time " (Minutes) 6

## 2018-10-20 NOTE — PLAN OF CARE
Problem: Patient Care Overview  Goal: Plan of Care/Patient Progress Review  Outcome: No Change  D: Pt admitted 10/19 with HF exacerbation for advanced therapy workup.  I: Monitored vitals and assessed pt status. PRN trazodone.  A: A0x4. VSS on RA with soft BPs; pt asymptomatic. SR on tele, HR  with freq PVCs up to 23/min and trigeminy. Denies pain, SOB, dizziness. Pt ambulated unit x1 overnight, tolerated well. Voiding adequately. Up ad moshe. 2L FR. Pt slept between cares, making needs known.  P: Continue to monitor and report changes/concerns to Cards 2.

## 2018-10-20 NOTE — PLAN OF CARE
Problem: Cardiac: Heart Failure (Adult)  Goal: Signs and Symptoms of Listed Potential Problems Will be Absent, Minimized or Managed (Cardiac: Heart Failure)  Signs and symptoms of listed potential problems will be absent, minimized or managed by discharge/transition of care (reference Cardiac: Heart Failure (Adult) CPG).   Outcome: No Change  D/I: Monitor shows SR  with PACs, PVCs, couplets and triplets. Refused IV bumex this this AM, stating that it doesn't get rid of enough fluid and he wants something that will work, although was willing to take this afternoon's dose to see if it will do anything. Frustrated that his weight has not decreased. Provider notified. Ambulating in halls independently. C/O bloating and that it is pushing up on his stomach. Simethicone given for burping and flatulence. Abdomen is slightly more distended. No BM since 10/19 AM. PICC placed late this shift. See flowsheets for assessments and additional data.  A: No change in fluid status.   P: Monitor weights and output with IV bumex. Waiting for further orders. Continue current cares and notify providers with questions or concerns.    10/20/18 1514   Cardiac: Heart Failure   Problems Assessed (Heart Failure) all   Problems Present (Heart Failure) cardiac pump dysfunction;dysrhythmia/arrhythmia;fluid/electrolyte imbalance;situational response

## 2018-10-20 NOTE — PLAN OF CARE
Problem: Cardiac: Heart Failure (Adult)  Goal: Signs and Symptoms of Listed Potential Problems Will be Absent, Minimized or Managed (Cardiac: Heart Failure)  Signs and symptoms of listed potential problems will be absent, minimized or managed by discharge/transition of care (reference Cardiac: Heart Failure (Adult) CPG).   Outcome: No Change  D/I/A: Pt up ad moshe and independent.  Tolerating activity well.  Denies pain or sob.  VSSA.  Bumex given with no significant output; MD Mondragon notified while on rounds.    P: Continue to monitor status.

## 2018-10-20 NOTE — PROGRESS NOTES
"                              Cards II Progress Note  Danielito Chu MRN: [id number]  Age: 63 year old, : 1955  Date: 10/20/2018            Interval History:     No acute events - PVCs up to 23/min .  4 point ROS negative      PHYSICAL EXAM    Vital signs:  Temp: 97.8  F (36.6  C) Temp src: Axillary BP: (!) 84/62   Heart Rate: 80 Resp: 18 SpO2: 96 % O2 Device: None (Room air)   Height: 175.3 cm (5' 9\") Weight: 89.7 kg (197 lb 12.8 oz)  Estimated body mass index is 29.21 kg/(m^2) as calculated from the following:    Height as of this encounter: 1.753 m (5' 9\").    Weight as of this encounter: 89.7 kg (197 lb 12.8 oz).      Intake/Output Summary (Last 24 hours) at 10/20/18 0709  Last data filed at 10/20/18 0425   Gross per 24 hour   Intake              120 ml   Output              825 ml   Net             -705 ml         GEN: NAD, resting comfortably on exam, pleasant and cooperative , AAox3  HEENT: NC/AT , well injected conjunctiva, EOMI, PERRL, MMM  Neck: trachea midline, JVD to jaw   CV: RRR, nl S1/S2 no mumurs  PULM: CTAB, symmetric chest rise  Abdomen: soft, no massess,  BS +   EXTREMITIES: warm, trace pedal edema, peripheral pulses intact  SKIN: No rashes, sores or ulcerations  NEURO: MS: AAOx3 - no focal deficit   DIAGNOSTIC STUDIES  ROUTINE ICU LABS (Last four results)  CMP  Recent Labs  Lab 10/19/18  1419 10/16/18  1338    136   POTASSIUM 3.9 4.3   CHLORIDE 101 100   CO2 29 30   ANIONGAP 8 6   * 126*   BUN 27 32*   CR 1.35* 1.51*   GFRESTIMATED 53* 47*   GFRESTBLACK 65 57*   ASHLEE 8.7 9.2   MAG 2.1  --    PROTTOTAL 7.4  --    ALBUMIN 3.5  --    BILITOTAL 2.2*  --    ALKPHOS 78  --    AST 14  --    ALT 11  --      CBC  Recent Labs  Lab 10/19/18  1419   WBC 5.4   RBC 5.42   HGB 16.8   HCT 53.2*   MCV 98   MCH 31.0   MCHC 31.6   RDW 16.1*        INR  Recent Labs  Lab 10/19/18  1419   INR 1.39*     Arterial Blood GasNo lab results found in last 7 days.          Assessment and Plan:  "    Danielito Chu is a 63 year old with PMH of HFrEF 2/2 NICM (EF 15%) , s/p ICD, HTN , DM II, SHIRLEY who is presenting for decompensated HF and expedited VAD/Transplant work up.    #Plan for today:  - Transition to lasix     #Decompensated HFrEF 2/2 NICM (EF 15%)   #S/P ICD   Likely related to non-medical adherence in the past couple of months. Patient initially diagnosed in 2017 - he did well for 7-8 months however started decompensating over the past couple of months.   RHC in 6/2018 showed elevated filling pressures and cardiopulmonary testing showed MVo2 9.3 (pred 30.4) suggestive of poor prognosis.   Will optimize medically , repeat RHC after diuresis and start VAD/Transplant work up.      - Diuresis: IV Bumex 4 mg BID + Lasix 160 mg   - BB: Hold Metoprolol 25 mg-XL   - ACE/ARB: Entresto BID   - Digoxin 250 mcg qday   - Atorvastatin 10 mg Qday  - Aspirin 81 mg qday   - Will start transplant work up      #Depression / Adjustment disorder   - Start wellbutrin 150 mg qday   - Will consider psychiatry/psychology consult      #NSVT   #s/p ICD   Noted on most recent interrogation of ICD  - K>4, Mg>2      #DM II   - sliding scale insulin      #SHIRLEY   - CPAP      #HTN  Entristo, BB as above.         Prophylaxis:  DVT: Ambulate   GI: None  Family: Not updated  Disposition: ~5 day hospital stay      Code Status: Full     Patient was seen and discussed with attending physician Dr. Mondragon, who agrees with above assessment and plan.     Harriett Dailey MD  Internal Medicine, PGY-3  Pager [phone]

## 2018-10-20 NOTE — PLAN OF CARE
Problem: Patient Care Overview  Goal: Plan of Care/Patient Progress Review  Discharge Planner OT   Patient plan for discharge: Home  Current status: Facilitated aerobic activity through ambulation and functional transfer. Pt ambulated >1,000 ft IND. Initiated education on HF management, pt claimed he was familiar with all information. VSS for duration of activity.   Barriers to return to prior living situation: Medical stability, deconditioning  Recommendations for discharge: Home with OP CR phase II  Rationale for recommendations: Pt is near functional baseline in independence for ADLs. Pt would benefit from continued CR to increase activity tolerance for ADLs/IADLs and continued education on HF management.        Entered by: Corinne James 10/20/2018 9:50 AM

## 2018-10-21 LAB
ANION GAP SERPL CALCULATED.3IONS-SCNC: 10 MMOL/L (ref 3–14)
ANION GAP SERPL CALCULATED.3IONS-SCNC: 10 MMOL/L (ref 3–14)
ANION GAP SERPL CALCULATED.3IONS-SCNC: 5 MMOL/L (ref 3–14)
BUN SERPL-MCNC: 31 MG/DL (ref 7–30)
CALCIUM SERPL-MCNC: 8.9 MG/DL (ref 8.5–10.1)
CALCIUM SERPL-MCNC: 9.1 MG/DL (ref 8.5–10.1)
CALCIUM SERPL-MCNC: 9.3 MG/DL (ref 8.5–10.1)
CHLORIDE SERPL-SCNC: 96 MMOL/L (ref 94–109)
CHLORIDE SERPL-SCNC: 98 MMOL/L (ref 94–109)
CHLORIDE SERPL-SCNC: 98 MMOL/L (ref 94–109)
CO2 SERPL-SCNC: 24 MMOL/L (ref 20–32)
CO2 SERPL-SCNC: 27 MMOL/L (ref 20–32)
CO2 SERPL-SCNC: 30 MMOL/L (ref 20–32)
CREAT SERPL-MCNC: 1.11 MG/DL (ref 0.66–1.25)
CREAT SERPL-MCNC: 1.22 MG/DL (ref 0.66–1.25)
CREAT SERPL-MCNC: 1.27 MG/DL (ref 0.66–1.25)
GFR SERPL CREATININE-BSD FRML MDRD: 57 ML/MIN/1.7M2
GFR SERPL CREATININE-BSD FRML MDRD: 60 ML/MIN/1.7M2
GFR SERPL CREATININE-BSD FRML MDRD: 67 ML/MIN/1.7M2
GLUCOSE BLDC GLUCOMTR-MCNC: 110 MG/DL (ref 70–99)
GLUCOSE BLDC GLUCOMTR-MCNC: 122 MG/DL (ref 70–99)
GLUCOSE BLDC GLUCOMTR-MCNC: 189 MG/DL (ref 70–99)
GLUCOSE SERPL-MCNC: 126 MG/DL (ref 70–99)
GLUCOSE SERPL-MCNC: 138 MG/DL (ref 70–99)
GLUCOSE SERPL-MCNC: 83 MG/DL (ref 70–99)
MAGNESIUM SERPL-MCNC: 2.4 MG/DL (ref 1.6–2.3)
MAGNESIUM SERPL-MCNC: 2.4 MG/DL (ref 1.6–2.3)
PHOSPHATE SERPL-MCNC: 4.5 MG/DL (ref 2.5–4.5)
POTASSIUM SERPL-SCNC: 3.6 MMOL/L (ref 3.4–5.3)
POTASSIUM SERPL-SCNC: 4.3 MMOL/L (ref 3.4–5.3)
POTASSIUM SERPL-SCNC: 5.5 MMOL/L (ref 3.4–5.3)
POTASSIUM SERPL-SCNC: 5.8 MMOL/L (ref 3.4–5.3)
SODIUM SERPL-SCNC: 131 MMOL/L (ref 133–144)
SODIUM SERPL-SCNC: 132 MMOL/L (ref 133–144)
SODIUM SERPL-SCNC: 136 MMOL/L (ref 133–144)

## 2018-10-21 PROCEDURE — 36415 COLL VENOUS BLD VENIPUNCTURE: CPT | Performed by: INTERNAL MEDICINE

## 2018-10-21 PROCEDURE — 25000128 H RX IP 250 OP 636: Performed by: STUDENT IN AN ORGANIZED HEALTH CARE EDUCATION/TRAINING PROGRAM

## 2018-10-21 PROCEDURE — 84100 ASSAY OF PHOSPHORUS: CPT | Performed by: STUDENT IN AN ORGANIZED HEALTH CARE EDUCATION/TRAINING PROGRAM

## 2018-10-21 PROCEDURE — 99233 SBSQ HOSP IP/OBS HIGH 50: CPT | Mod: GC | Performed by: INTERNAL MEDICINE

## 2018-10-21 PROCEDURE — 80048 BASIC METABOLIC PNL TOTAL CA: CPT | Performed by: INTERNAL MEDICINE

## 2018-10-21 PROCEDURE — 80048 BASIC METABOLIC PNL TOTAL CA: CPT | Performed by: STUDENT IN AN ORGANIZED HEALTH CARE EDUCATION/TRAINING PROGRAM

## 2018-10-21 PROCEDURE — 25000132 ZZH RX MED GY IP 250 OP 250 PS 637: Performed by: STUDENT IN AN ORGANIZED HEALTH CARE EDUCATION/TRAINING PROGRAM

## 2018-10-21 PROCEDURE — 83735 ASSAY OF MAGNESIUM: CPT | Performed by: INTERNAL MEDICINE

## 2018-10-21 PROCEDURE — 84132 ASSAY OF SERUM POTASSIUM: CPT | Performed by: INTERNAL MEDICINE

## 2018-10-21 PROCEDURE — 83735 ASSAY OF MAGNESIUM: CPT | Performed by: STUDENT IN AN ORGANIZED HEALTH CARE EDUCATION/TRAINING PROGRAM

## 2018-10-21 PROCEDURE — 00000146 ZZHCL STATISTIC GLUCOSE BY METER IP

## 2018-10-21 PROCEDURE — 21400006 ZZH R&B CCU INTERMEDIATE UMMC

## 2018-10-21 PROCEDURE — 36592 COLLECT BLOOD FROM PICC: CPT | Performed by: INTERNAL MEDICINE

## 2018-10-21 RX ORDER — POTASSIUM CHLORIDE 1.5 G/1.58G
40 POWDER, FOR SOLUTION ORAL ONCE
Status: COMPLETED | OUTPATIENT
Start: 2018-10-21 | End: 2018-10-21

## 2018-10-21 RX ORDER — METOLAZONE 2.5 MG/1
2.5 TABLET ORAL ONCE
Status: DISCONTINUED | OUTPATIENT
Start: 2018-10-21 | End: 2018-10-21

## 2018-10-21 RX ORDER — FUROSEMIDE 10 MG/ML
120 INJECTION INTRAMUSCULAR; INTRAVENOUS ONCE
Status: COMPLETED | OUTPATIENT
Start: 2018-10-21 | End: 2018-10-21

## 2018-10-21 RX ADMIN — DIGOXIN 250 MCG: 125 TABLET ORAL at 07:37

## 2018-10-21 RX ADMIN — RANITIDINE 150 MG: 150 TABLET, FILM COATED ORAL at 07:37

## 2018-10-21 RX ADMIN — RANITIDINE 150 MG: 150 TABLET, FILM COATED ORAL at 20:28

## 2018-10-21 RX ADMIN — POTASSIUM CHLORIDE 40 MEQ: 1.5 POWDER, FOR SOLUTION ORAL at 11:58

## 2018-10-21 RX ADMIN — ASPIRIN 81 MG: 81 TABLET, COATED ORAL at 07:37

## 2018-10-21 RX ADMIN — FUROSEMIDE 120 MG: 10 INJECTION, SOLUTION INTRAVENOUS at 09:05

## 2018-10-21 RX ADMIN — ATORVASTATIN CALCIUM 10 MG: 10 TABLET, FILM COATED ORAL at 20:28

## 2018-10-21 RX ADMIN — FUROSEMIDE 5 MG/HR: 10 INJECTION, SOLUTION INTRAVENOUS at 09:16

## 2018-10-21 RX ADMIN — BUPROPION HYDROCHLORIDE 150 MG: 150 TABLET, FILM COATED, EXTENDED RELEASE ORAL at 07:37

## 2018-10-21 ASSESSMENT — ACTIVITIES OF DAILY LIVING (ADL)
ADLS_ACUITY_SCORE: 10

## 2018-10-21 NOTE — PLAN OF CARE
Problem: Patient Care Overview  Goal: Plan of Care/Patient Progress Review  Outcome: No Change  D: Pt admitted 10/19 with HF exacerbation, advanced therapy workup.  I: Monitored vitals and assessed pt status.  A: A0x4. VSS on RA. Tele showed 8 bts VT (QC=529), pt asymptomatic, VSS; cross-cover paged and labs drawn. Otherwise SR on tele, HR 70s-90s, with freq PVCs up to 20/min, couplets, triplets, and bigeminy. Afebrile. Denies pain, SOB, dizziness. Adequate uop following lasix dose at HS. Pt appeared to rest comfortably between cares, making needs known.  P: Continue to monitor and report changes/concerns to Cards 2.

## 2018-10-21 NOTE — PLAN OF CARE
Problem: Patient Care Overview  Goal: Plan of Care/Patient Progress Review  6C/OT: Cancel. Pt reporting fatigue and is expecting family to visit. Will reschedule for tomorrow.

## 2018-10-21 NOTE — PLAN OF CARE
Problem: Cardiac: Heart Failure (Adult)  Goal: Signs and Symptoms of Listed Potential Problems Will be Absent, Minimized or Managed (Cardiac: Heart Failure)  Signs and symptoms of listed potential problems will be absent, minimized or managed by discharge/transition of care (reference Cardiac: Heart Failure (Adult) CPG).   Outcome: No Change  D/I/A: Pt up ad moshe in halls and room.  Independent with activity and tolerating activity well.  A+O x4 and able to make needs known.  Denies pain; mild BAJWA noted.  VSSA.  Pleasant and cooperative with cares and treatments.  Voiding in small amounts without difficulty.  P: Continue to monitor status.  Monitor I/O s/p Lasix dose near bedtime.

## 2018-10-21 NOTE — PLAN OF CARE
Problem: Cardiac: Heart Failure (Adult)  Goal: Signs and Symptoms of Listed Potential Problems Will be Absent, Minimized or Managed (Cardiac: Heart Failure)  Signs and symptoms of listed potential problems will be absent, minimized or managed by discharge/transition of care (reference Cardiac: Heart Failure (Adult) CPG).   Outcome: No Change  D/I: Monitor shows SR  with PACs, PVCs, couplets and triplets. Received 120 mg IV lasix and started on lasix drip at 5mg/hour. 650 ml urine output since started. Frustrated that his weight has not decreased. Ambulating in halls independently. See flowsheets for assessments and additional data.  A: No change in fluid status. Improving UO with lasix. .   P: Monitor weights and output with IV lasix. Continue current cares and notify providers with questions or concerns.    10/21/18 1252   Cardiac: Heart Failure   Problems Assessed (Heart Failure) all   Problems Present (Heart Failure) cardiac pump dysfunction;dysrhythmia/arrhythmia;fluid/electrolyte imbalance;situational response

## 2018-10-21 NOTE — PROGRESS NOTES
"                              Cards II Progress Note  Danielito Chu MRN: [id number]  Age: 63 year old, : 1955  Date: 10/21/2018            Interval History:     No acute events - had a run of NSVT.  4 point ROS negative      PHYSICAL EXAM    Vital signs:  Temp: 97  F (36.1  C) Temp src: Axillary BP: 97/74   Heart Rate: 92 Resp: 16 SpO2: 99 % O2 Device: None (Room air)   Height: 175.3 cm (5' 9\") Weight: 89.1 kg (196 lb 6.4 oz)  Estimated body mass index is 29 kg/(m^2) as calculated from the following:    Height as of this encounter: 1.753 m (5' 9\").    Weight as of this encounter: 89.1 kg (196 lb 6.4 oz).      Intake/Output Summary (Last 24 hours) at 10/20/18 0709  Last data filed at 10/20/18 0425   Gross per 24 hour   Intake              120 ml   Output              825 ml   Net             -705 ml         GEN: NAD, resting comfortably on exam, pleasant and cooperative , AAox3  HEENT: NC/AT , well injected conjunctiva, EOMI, PERRL, MMM  Neck: trachea midline, JVD to jaw   CV: RRR, nl S1/S2 no mumurs  PULM: CTAB, symmetric chest rise  Abdomen: soft, no massess,  BS +   EXTREMITIES: warm, trace pedal edema, peripheral pulses intact  SKIN: No rashes, sores or ulcerations  NEURO: MS: AAOx3 - no focal deficit   DIAGNOSTIC STUDIES  ROUTINE ICU LABS (Last four results)  CMP    Recent Labs  Lab 10/21/18  0533 10/20/18  1853 10/20/18  0617 10/19/18  1419   * 135 138 138   POTASSIUM 5.8* 4.5 4.5 3.9   CHLORIDE 98 98 99 101   CO2 24 26 28 29   ANIONGAP 10 10 11 8   * 133* 118* 102*   BUN 31* 31* 32* 27   CR 1.11 1.30* 1.41* 1.35*   GFRESTIMATED 67 56* 51* 53*   GFRESTBLACK 81 68 62 65   ASHLEE 9.1 8.7 9.3 8.7   MAG 2.4* 2.2 2.4* 2.1   PROTTOTAL  --   --   --  7.4   ALBUMIN  --   --   --  3.5   BILITOTAL  --   --   --  2.2*   ALKPHOS  --   --   --  78   AST  --   --   --  14   ALT  --   --   --  11     CBC    Recent Labs  Lab 10/19/18  1419   WBC 5.4   RBC 5.42   HGB 16.8   HCT 53.2*   MCV 98   MCH 31.0 "   MCHC 31.6   RDW 16.1*        INR    Recent Labs  Lab 10/19/18  1419   INR 1.39*     Arterial Blood GasNo lab results found in last 7 days.          Assessment and Plan:     Danielito Chu is a 63 year old with PMH of HFrEF 2/2 NICM (EF 15%) , s/p ICD, HTN , DM II, SHIRLEY who is presenting for decompensated HF and expedited VAD/Transplant work up.    #Plan for today:  - Start lasix gtt     #Decompensated HFrEF 2/2 NICM (EF 15%)   #S/P ICD   Likely related to non-medical adherence in the past couple of months. Patient initially diagnosed in 2017 - he did well for 7-8 months however started decompensating over the past couple of months.   RHC in 6/2018 showed elevated filling pressures and cardiopulmonary testing showed MVo2 9.3 (pred 30.4) suggestive of poor prognosis.     Will optimize medically , repeat RHC after diuresis and start VAD/Transplant work up.      - Diuresis: Lasix 120 mg --> lasix 5 mg/hr gtt (poor response to bumex)   - BB: Hold PTA Metoprolol 25 mg-XL   - ACE/ARB: Hold PTA Entresto BID   - Digoxin 250 mcg qday   - Atorvastatin 10 mg Qday  - Aspirin 81 mg qday   - Will start transplant work up      #Depression / Adjustment disorder   - Start wellbutrin 150 mg qday   - Will consider psychiatry/psychology consult      #NSVT   #s/p ICD   Noted on most recent interrogation of ICD  - K>4, Mg>2      #DM II   - sliding scale insulin      #SHIRLEY   - CPAP      #HTN  Entristo, BB as above.         Prophylaxis:  DVT: Ambulate   GI: None  Family: Not updated  Disposition: ~5 day hospital stay   Code Status: Full     Patient was seen and discussed with attending physician Dr. Mondragon, who agrees with above assessment and plan.     Harriett Dailey MD  Internal Medicine, PGY-3  Pager [phone]

## 2018-10-22 ENCOUNTER — APPOINTMENT (OUTPATIENT)
Dept: OCCUPATIONAL THERAPY | Facility: CLINIC | Age: 63
DRG: 287 | End: 2018-10-22
Attending: INTERNAL MEDICINE
Payer: COMMERCIAL

## 2018-10-22 LAB
ANION GAP SERPL CALCULATED.3IONS-SCNC: 11 MMOL/L (ref 3–14)
ANION GAP SERPL CALCULATED.3IONS-SCNC: 11 MMOL/L (ref 3–14)
BUN SERPL-MCNC: 26 MG/DL (ref 7–30)
BUN SERPL-MCNC: 28 MG/DL (ref 7–30)
CALCIUM SERPL-MCNC: 8.8 MG/DL (ref 8.5–10.1)
CALCIUM SERPL-MCNC: 8.9 MG/DL (ref 8.5–10.1)
CHLORIDE SERPL-SCNC: 97 MMOL/L (ref 94–109)
CHLORIDE SERPL-SCNC: 97 MMOL/L (ref 94–109)
CO2 SERPL-SCNC: 26 MMOL/L (ref 20–32)
CO2 SERPL-SCNC: 27 MMOL/L (ref 20–32)
CREAT SERPL-MCNC: 1.01 MG/DL (ref 0.66–1.25)
CREAT SERPL-MCNC: 1.19 MG/DL (ref 0.66–1.25)
GFR SERPL CREATININE-BSD FRML MDRD: 62 ML/MIN/1.7M2
GFR SERPL CREATININE-BSD FRML MDRD: 75 ML/MIN/1.7M2
GLUCOSE SERPL-MCNC: 131 MG/DL (ref 70–99)
GLUCOSE SERPL-MCNC: 88 MG/DL (ref 70–99)
INTERPRETATION ECG - MUSE: NORMAL
MAGNESIUM SERPL-MCNC: 2.1 MG/DL (ref 1.6–2.3)
MAGNESIUM SERPL-MCNC: 2.2 MG/DL (ref 1.6–2.3)
POTASSIUM SERPL-SCNC: 3.8 MMOL/L (ref 3.4–5.3)
POTASSIUM SERPL-SCNC: 5.3 MMOL/L (ref 3.4–5.3)
POTASSIUM SERPL-SCNC: 5.7 MMOL/L (ref 3.4–5.3)
SODIUM SERPL-SCNC: 134 MMOL/L (ref 133–144)
SODIUM SERPL-SCNC: 135 MMOL/L (ref 133–144)

## 2018-10-22 PROCEDURE — 25000128 H RX IP 250 OP 636: Performed by: INTERNAL MEDICINE

## 2018-10-22 PROCEDURE — 97110 THERAPEUTIC EXERCISES: CPT | Mod: GO

## 2018-10-22 PROCEDURE — 36592 COLLECT BLOOD FROM PICC: CPT | Performed by: INTERNAL MEDICINE

## 2018-10-22 PROCEDURE — 83735 ASSAY OF MAGNESIUM: CPT | Performed by: INTERNAL MEDICINE

## 2018-10-22 PROCEDURE — 36415 COLL VENOUS BLD VENIPUNCTURE: CPT | Performed by: STUDENT IN AN ORGANIZED HEALTH CARE EDUCATION/TRAINING PROGRAM

## 2018-10-22 PROCEDURE — 99233 SBSQ HOSP IP/OBS HIGH 50: CPT | Mod: GC | Performed by: INTERNAL MEDICINE

## 2018-10-22 PROCEDURE — 25000132 ZZH RX MED GY IP 250 OP 250 PS 637: Performed by: STUDENT IN AN ORGANIZED HEALTH CARE EDUCATION/TRAINING PROGRAM

## 2018-10-22 PROCEDURE — 80048 BASIC METABOLIC PNL TOTAL CA: CPT | Performed by: INTERNAL MEDICINE

## 2018-10-22 PROCEDURE — 40000133 ZZH STATISTIC OT WARD VISIT

## 2018-10-22 PROCEDURE — 80048 BASIC METABOLIC PNL TOTAL CA: CPT | Performed by: STUDENT IN AN ORGANIZED HEALTH CARE EDUCATION/TRAINING PROGRAM

## 2018-10-22 PROCEDURE — 25000132 ZZH RX MED GY IP 250 OP 250 PS 637: Performed by: INTERNAL MEDICINE

## 2018-10-22 PROCEDURE — 25000128 H RX IP 250 OP 636: Performed by: STUDENT IN AN ORGANIZED HEALTH CARE EDUCATION/TRAINING PROGRAM

## 2018-10-22 PROCEDURE — 83735 ASSAY OF MAGNESIUM: CPT | Performed by: STUDENT IN AN ORGANIZED HEALTH CARE EDUCATION/TRAINING PROGRAM

## 2018-10-22 PROCEDURE — 84132 ASSAY OF SERUM POTASSIUM: CPT | Performed by: INTERNAL MEDICINE

## 2018-10-22 PROCEDURE — 40000802 ZZH SITE CHECK

## 2018-10-22 PROCEDURE — 21400006 ZZH R&B CCU INTERMEDIATE UMMC

## 2018-10-22 RX ORDER — POTASSIUM CHLORIDE 20MEQ/15ML
20 LIQUID (ML) ORAL ONCE
Status: COMPLETED | OUTPATIENT
Start: 2018-10-22 | End: 2018-10-22

## 2018-10-22 RX ORDER — FUROSEMIDE 10 MG/ML
120 INJECTION INTRAMUSCULAR; INTRAVENOUS ONCE
Status: COMPLETED | OUTPATIENT
Start: 2018-10-22 | End: 2018-10-22

## 2018-10-22 RX ADMIN — ASPIRIN 81 MG: 81 TABLET, COATED ORAL at 09:22

## 2018-10-22 RX ADMIN — POTASSIUM CHLORIDE 20 MEQ: 20 SOLUTION ORAL at 20:58

## 2018-10-22 RX ADMIN — DIGOXIN 250 MCG: 125 TABLET ORAL at 09:22

## 2018-10-22 RX ADMIN — Medication 5 MG: at 00:48

## 2018-10-22 RX ADMIN — ATORVASTATIN CALCIUM 10 MG: 10 TABLET, FILM COATED ORAL at 19:43

## 2018-10-22 RX ADMIN — SIMETHICONE CHEW TAB 80 MG 80 MG: 80 TABLET ORAL at 01:41

## 2018-10-22 RX ADMIN — FUROSEMIDE 5 MG/HR: 10 INJECTION, SOLUTION INTRAVENOUS at 03:05

## 2018-10-22 RX ADMIN — BUPROPION HYDROCHLORIDE 150 MG: 150 TABLET, FILM COATED, EXTENDED RELEASE ORAL at 09:22

## 2018-10-22 RX ADMIN — RANITIDINE 150 MG: 150 TABLET, FILM COATED ORAL at 19:43

## 2018-10-22 RX ADMIN — RANITIDINE 150 MG: 150 TABLET, FILM COATED ORAL at 09:22

## 2018-10-22 RX ADMIN — FUROSEMIDE 120 MG: 10 INJECTION, SOLUTION INTRAVENOUS at 11:14

## 2018-10-22 ASSESSMENT — ACTIVITIES OF DAILY LIVING (ADL)
ADLS_ACUITY_SCORE: 10

## 2018-10-22 NOTE — PLAN OF CARE
Problem: Patient Care Overview  Goal: Plan of Care/Patient Progress Review  Discharge Planner OT   Patient plan for discharge: Home  Current status: Facilitated aerobic activity through ambulation and Nu-step. Pt ambulated 200 ft IND. Pt completed 12 continuous minutes of activity on Nu-step WL 3-5. VSS for duration of activity.   Barriers to return to prior living situation: medical stability, deconditioning  Recommendations for discharge: Home with OP CR  Rationale for recommendations: Pt near functional baseline for ADLs. Pt would benefit from continued OP CR to increase activity tolerance for ADLs to return to PLOF.        Entered by: Corinne James 10/22/2018 11:59 AM

## 2018-10-22 NOTE — PLAN OF CARE
Problem: Cardiac: Heart Failure (Adult)  Goal: Signs and Symptoms of Listed Potential Problems Will be Absent, Minimized or Managed (Cardiac: Heart Failure)  Signs and symptoms of listed potential problems will be absent, minimized or managed by discharge/transition of care (reference Cardiac: Heart Failure (Adult) CPG).   Outcome: No Change  D/I/A: Pt up ad moshe in halls and room.  Independent with activity and tolerating activity well.  A+O x4 and able to make needs known.  Denies pain; mild BAJWA noted.  VSSA.  9 beats VT reported at start of shift; MDs on Cards 2 notified.  Pleasant and cooperative with cares and treatments. Lasix infusion monitored and maintained.  Voiding in fair amounts without difficulty.  K+ 4.3, after hemolyzed specimen x2.    P: Continue to monitor status.

## 2018-10-22 NOTE — PROGRESS NOTES
Social Work Services Brief Progress Note:     SW asked to confirm pt's insurance on file by CORE team.  SW called financial counseling who reviewed and the [employer] listed on file was electronically verified as active on admission.  Please re-consult if other needs arise.    GEM Kan, APSW  6C Unit   Phone: [phone]  Pager: [phone]  Unit: [phone]

## 2018-10-22 NOTE — PROGRESS NOTES
Cardiology Progress Note  Danielito Chu MRN: [id number]  Age: 63 year old, : 1955  10/22/18            Subjective     Patient states he is feeling well.Decreased dyspnea this AM, states he is able to lay ore flat. Tells me he knows at some point his heart will eventually stop functioning as well and is glad he is admitted.           Objective     Vitals:  Temp:  [96.5  F (35.8  C)-97.6  F (36.4  C)] 97.3  F (36.3  C)  Heart Rate:  [79-89] 84  Resp:  [18] 18  BP: ()/(65-75) 99/66  SpO2:  [95 %-99 %] 98 %  Vitals:    10/20/18 0420 10/21/18 0433 10/22/18 0640   Weight: 89.7 kg (197 lb 12.8 oz) 89.1 kg (196 lb 6.4 oz) 88.1 kg (194 lb 3.2 oz)     Gen: AA&Ox3, no acute distress, comfortably laying in bed.   PULM/THORAX: Clear to auscultation bilaterally, no rales/stridor/wheezes  CV:RRR, S1 and S2 appreciated, no extra heart sounds, murmurs or rub auscultated. JVD present to earlobe   ABD: soft and nt, nd   EXT: no significant edema             Data:      Labs reviewed. Pertinent for : K - 5.7 and5.3 however helolyzed. Starting additional lasix thus should downtrend. Repeat in PM. Cr 1.01 downtrending from prior   Glucose wnl between 100 - 131           Medications     Medications    aspirin  81 mg Oral Daily     atorvastatin  10 mg Oral Daily     buPROPion  150 mg Oral Daily     digoxin  250 mcg Oral Daily     ranitidine  150 mg Oral BID     sodium chloride (PF)  10 mL Intracatheter Q7 Days       - MEDICATION INSTRUCTIONS -       furosemide (LASIX) infusion ADULT STANDARD 10 mg/hr (10/22/18 1115)           Changes Today:     - Discussed with vad coordinators regarding financial clearance. Pending such may proceed with work up for transplant / vad   - Ongoing diuresis, increase lasix gtt and bolus 120mg Lasix in AM         Assessment and Plan:     #Decompensated HFrEF 2/2 NICM (EF 15%)   #S/P ICD   Likely related to non-medical adherence in the past couple of months.  Patient initially diagnosed in 2017 - he did well for 7-8 months however started decompensating over the past couple of months.   RHC in 6/2018 showed elevated filling pressures and cardiopulmonary testing showed MVo2 9.3 (pred 30.4) suggestive of poor prognosis.      Will optimize medically , repeat RHC after diuresis and start VAD/Transplant work up.       - Diuresis: Lasix 120 mg --> lasix 5 mg/hr gtt (good output) --> 10/22 increase to 10mg and bolus 120mg  - BB: Hold PTA Metoprolol 25 mg-XL   - ACE/ARB: Hold PTA Entresto BID   - Digoxin 250 mcg qday   - Atorvastatin 10 mg Qday  - Aspirin 81 mg qday   - Will start transplant work up       #Depression / Adjustment disorder   - Start wellbutrin 150 mg qday   - Will consider psychiatry/psychology consult       #NSVT   #s/p ICD   Noted on most recent interrogation of ICD  - K>4, Mg>2       #DM II : sliding scale insulin       #SHIRLEY: CPAP       #HTN: PTA entersto and BB. Holding both for now       Prophylaxis:  DVT: Ambulate   GI: None  Disposition: Pending further diuresis and work up.   Code Status: Full     Patient was discussed with staff attending, Dr. Giancarlo Rehman  PGY-2 Internal Medicine  Cardiology Service

## 2018-10-22 NOTE — PLAN OF CARE
Problem: Patient Care Overview  Goal: Plan of Care/Patient Progress Review  Outcome: Improving  D: Pt admitted 10/19 with HF exacerbation for advanced therapy workup.  I: Monitored vitals and assessed pt status. Lasix gtt @ 5mg/hr. PRN melatonin, simethicone.  A: A0x4. VSS on RA. SR on tele, HR 75-90 with freq PVCs up to 22/min, couplets, triplets, and multifocal 4-bt runs. Afebrile. Denies pain; simethicone given for gas discomfort. Voiding in adequate amounts. 2L FR. Up ind. Pt ambulated in halls x3 overnight, tolerated well. Pt appeared to rest on and off between cares, making needs known.  P: Continue to monitor and report changes/concerns to Cards 2.

## 2018-10-23 ENCOUNTER — APPOINTMENT (OUTPATIENT)
Dept: GENERAL RADIOLOGY | Facility: CLINIC | Age: 63
DRG: 287 | End: 2018-10-23
Attending: INTERNAL MEDICINE
Payer: COMMERCIAL

## 2018-10-23 ENCOUNTER — APPOINTMENT (OUTPATIENT)
Dept: CT IMAGING | Facility: CLINIC | Age: 63
DRG: 287 | End: 2018-10-23
Attending: INTERNAL MEDICINE
Payer: COMMERCIAL

## 2018-10-23 ENCOUNTER — APPOINTMENT (OUTPATIENT)
Dept: ULTRASOUND IMAGING | Facility: CLINIC | Age: 63
DRG: 287 | End: 2018-10-23
Attending: INTERNAL MEDICINE
Payer: COMMERCIAL

## 2018-10-23 ENCOUNTER — APPOINTMENT (OUTPATIENT)
Dept: CARDIOLOGY | Facility: CLINIC | Age: 63
DRG: 287 | End: 2018-10-23
Attending: INTERNAL MEDICINE
Payer: COMMERCIAL

## 2018-10-23 LAB
ALBUMIN UR-MCNC: NEGATIVE MG/DL
ANION GAP SERPL CALCULATED.3IONS-SCNC: 8 MMOL/L (ref 3–14)
ANION GAP SERPL CALCULATED.3IONS-SCNC: 9 MMOL/L (ref 3–14)
APPEARANCE UR: CLEAR
BILIRUB UR QL STRIP: NEGATIVE
BUN SERPL-MCNC: 27 MG/DL (ref 7–30)
BUN SERPL-MCNC: 27 MG/DL (ref 7–30)
CALCIUM SERPL-MCNC: 8.9 MG/DL (ref 8.5–10.1)
CALCIUM SERPL-MCNC: 9.1 MG/DL (ref 8.5–10.1)
CHLORIDE SERPL-SCNC: 100 MMOL/L (ref 94–109)
CHLORIDE SERPL-SCNC: 97 MMOL/L (ref 94–109)
CO2 SERPL-SCNC: 26 MMOL/L (ref 20–32)
CO2 SERPL-SCNC: 27 MMOL/L (ref 20–32)
COLOR UR AUTO: YELLOW
CREAT SERPL-MCNC: 1.07 MG/DL (ref 0.66–1.25)
CREAT SERPL-MCNC: 1.1 MG/DL (ref 0.66–1.25)
GFR SERPL CREATININE-BSD FRML MDRD: 68 ML/MIN/1.7M2
GFR SERPL CREATININE-BSD FRML MDRD: 70 ML/MIN/1.7M2
GLUCOSE SERPL-MCNC: 123 MG/DL (ref 70–99)
GLUCOSE SERPL-MCNC: 94 MG/DL (ref 70–99)
GLUCOSE UR STRIP-MCNC: NEGATIVE MG/DL
HGB UR QL STRIP: NEGATIVE
KETONES UR STRIP-MCNC: NEGATIVE MG/DL
LEUKOCYTE ESTERASE UR QL STRIP: NEGATIVE
MAGNESIUM SERPL-MCNC: 2.1 MG/DL (ref 1.6–2.3)
MAGNESIUM SERPL-MCNC: 2.2 MG/DL (ref 1.6–2.3)
NITRATE UR QL: NEGATIVE
PH UR STRIP: 6.5 PH (ref 5–7)
POTASSIUM SERPL-SCNC: 3.6 MMOL/L (ref 3.4–5.3)
POTASSIUM SERPL-SCNC: 4 MMOL/L (ref 3.4–5.3)
POTASSIUM SERPL-SCNC: 6.6 MMOL/L (ref 3.4–5.3)
POTASSIUM SERPL-SCNC: 7.1 MMOL/L (ref 3.4–5.3)
RBC #/AREA URNS AUTO: <1 /HPF (ref 0–2)
SODIUM SERPL-SCNC: 131 MMOL/L (ref 133–144)
SODIUM SERPL-SCNC: 136 MMOL/L (ref 133–144)
SOURCE: NORMAL
SP GR UR STRIP: 1.01 (ref 1–1.03)
UROBILINOGEN UR STRIP-MCNC: NORMAL MG/DL (ref 0–2)
WBC #/AREA URNS AUTO: 0 /HPF (ref 0–5)

## 2018-10-23 PROCEDURE — 93922 UPR/L XTREMITY ART 2 LEVELS: CPT

## 2018-10-23 PROCEDURE — 80320 DRUG SCREEN QUANTALCOHOLS: CPT | Performed by: STUDENT IN AN ORGANIZED HEALTH CARE EDUCATION/TRAINING PROGRAM

## 2018-10-23 PROCEDURE — 70450 CT HEAD/BRAIN W/O DYE: CPT

## 2018-10-23 PROCEDURE — 36592 COLLECT BLOOD FROM PICC: CPT | Performed by: INTERNAL MEDICINE

## 2018-10-23 PROCEDURE — 93010 ELECTROCARDIOGRAM REPORT: CPT | Performed by: INTERNAL MEDICINE

## 2018-10-23 PROCEDURE — 93880 EXTRACRANIAL BILAT STUDY: CPT

## 2018-10-23 PROCEDURE — 87086 URINE CULTURE/COLONY COUNT: CPT | Performed by: STUDENT IN AN ORGANIZED HEALTH CARE EDUCATION/TRAINING PROGRAM

## 2018-10-23 PROCEDURE — 40000358 ZZHCL STATISTIC DRUG SCREEN MULTIPLE (METRO): Performed by: STUDENT IN AN ORGANIZED HEALTH CARE EDUCATION/TRAINING PROGRAM

## 2018-10-23 PROCEDURE — 25000132 ZZH RX MED GY IP 250 OP 250 PS 637: Performed by: STUDENT IN AN ORGANIZED HEALTH CARE EDUCATION/TRAINING PROGRAM

## 2018-10-23 PROCEDURE — 25500064 ZZH RX 255 OP 636: Performed by: INTERNAL MEDICINE

## 2018-10-23 PROCEDURE — 93925 LOWER EXTREMITY STUDY: CPT

## 2018-10-23 PROCEDURE — 36592 COLLECT BLOOD FROM PICC: CPT | Performed by: STUDENT IN AN ORGANIZED HEALTH CARE EDUCATION/TRAINING PROGRAM

## 2018-10-23 PROCEDURE — 82272 OCCULT BLD FECES 1-3 TESTS: CPT | Performed by: STUDENT IN AN ORGANIZED HEALTH CARE EDUCATION/TRAINING PROGRAM

## 2018-10-23 PROCEDURE — 99233 SBSQ HOSP IP/OBS HIGH 50: CPT | Mod: 25 | Performed by: INTERNAL MEDICINE

## 2018-10-23 PROCEDURE — 84132 ASSAY OF SERUM POTASSIUM: CPT | Performed by: STUDENT IN AN ORGANIZED HEALTH CARE EDUCATION/TRAINING PROGRAM

## 2018-10-23 PROCEDURE — 80048 BASIC METABOLIC PNL TOTAL CA: CPT | Performed by: STUDENT IN AN ORGANIZED HEALTH CARE EDUCATION/TRAINING PROGRAM

## 2018-10-23 PROCEDURE — 25000128 H RX IP 250 OP 636: Performed by: INTERNAL MEDICINE

## 2018-10-23 PROCEDURE — 36415 COLL VENOUS BLD VENIPUNCTURE: CPT | Performed by: STUDENT IN AN ORGANIZED HEALTH CARE EDUCATION/TRAINING PROGRAM

## 2018-10-23 PROCEDURE — 71046 X-RAY EXAM CHEST 2 VIEWS: CPT | Mod: 76

## 2018-10-23 PROCEDURE — 25000132 ZZH RX MED GY IP 250 OP 250 PS 637: Performed by: INTERNAL MEDICINE

## 2018-10-23 PROCEDURE — 93306 TTE W/DOPPLER COMPLETE: CPT | Mod: 26 | Performed by: INTERNAL MEDICINE

## 2018-10-23 PROCEDURE — 93306 TTE W/DOPPLER COMPLETE: CPT

## 2018-10-23 PROCEDURE — 93005 ELECTROCARDIOGRAM TRACING: CPT

## 2018-10-23 PROCEDURE — 84132 ASSAY OF SERUM POTASSIUM: CPT | Performed by: INTERNAL MEDICINE

## 2018-10-23 PROCEDURE — 80307 DRUG TEST PRSMV CHEM ANLYZR: CPT | Performed by: STUDENT IN AN ORGANIZED HEALTH CARE EDUCATION/TRAINING PROGRAM

## 2018-10-23 PROCEDURE — 71046 X-RAY EXAM CHEST 2 VIEWS: CPT

## 2018-10-23 PROCEDURE — 81001 URINALYSIS AUTO W/SCOPE: CPT | Performed by: STUDENT IN AN ORGANIZED HEALTH CARE EDUCATION/TRAINING PROGRAM

## 2018-10-23 PROCEDURE — 21400006 ZZH R&B CCU INTERMEDIATE UMMC

## 2018-10-23 PROCEDURE — 83735 ASSAY OF MAGNESIUM: CPT | Performed by: STUDENT IN AN ORGANIZED HEALTH CARE EDUCATION/TRAINING PROGRAM

## 2018-10-23 PROCEDURE — 76700 US EXAM ABDOM COMPLETE: CPT

## 2018-10-23 PROCEDURE — 71250 CT THORAX DX C-: CPT

## 2018-10-23 RX ORDER — LANOLIN ALCOHOL/MO/W.PET/CERES
100 CREAM (GRAM) TOPICAL DAILY
Status: DISCONTINUED | OUTPATIENT
Start: 2018-10-23 | End: 2018-10-30 | Stop reason: HOSPADM

## 2018-10-23 RX ORDER — POTASSIUM CHLORIDE 1.5 G/1.58G
40 POWDER, FOR SOLUTION ORAL ONCE
Status: COMPLETED | OUTPATIENT
Start: 2018-10-23 | End: 2018-10-23

## 2018-10-23 RX ADMIN — BUPROPION HYDROCHLORIDE 150 MG: 150 TABLET, FILM COATED, EXTENDED RELEASE ORAL at 07:38

## 2018-10-23 RX ADMIN — ASPIRIN 81 MG: 81 TABLET, COATED ORAL at 07:37

## 2018-10-23 RX ADMIN — HUMAN ALBUMIN MICROSPHERES AND PERFLUTREN 6 ML: 10; .22 INJECTION, SOLUTION INTRAVENOUS at 16:30

## 2018-10-23 RX ADMIN — RANITIDINE 150 MG: 150 TABLET, FILM COATED ORAL at 07:37

## 2018-10-23 RX ADMIN — DIGOXIN 250 MCG: 125 TABLET ORAL at 07:38

## 2018-10-23 RX ADMIN — POTASSIUM CHLORIDE 40 MEQ: 1.5 POWDER, FOR SOLUTION ORAL at 18:08

## 2018-10-23 RX ADMIN — Medication 100 MG: at 13:45

## 2018-10-23 RX ADMIN — FUROSEMIDE 10 MG/HR: 10 INJECTION, SOLUTION INTRAVENOUS at 15:31

## 2018-10-23 RX ADMIN — ATORVASTATIN CALCIUM 10 MG: 10 TABLET, FILM COATED ORAL at 19:39

## 2018-10-23 RX ADMIN — FUROSEMIDE 10 MG/HR: 10 INJECTION, SOLUTION INTRAVENOUS at 04:24

## 2018-10-23 RX ADMIN — Medication 5 MG: at 00:29

## 2018-10-23 RX ADMIN — Medication 5 MG: at 23:09

## 2018-10-23 RX ADMIN — RANITIDINE 150 MG: 150 TABLET, FILM COATED ORAL at 19:39

## 2018-10-23 ASSESSMENT — ACTIVITIES OF DAILY LIVING (ADL)
ADLS_ACUITY_SCORE: 10

## 2018-10-23 NOTE — PLAN OF CARE
Problem: Patient Care Overview  Goal: Plan of Care/Patient Progress Review    D:  Pt admitted 10/19 w/ decompensated HF  I/A:  VSS on room air.  Pt very pleasant and cooperative, expressed concern about LVAD vs txp.  US, CXR and echo done per orders.  Lasix gtt continues at 10 mg/hr.  Adequate UOP, UA sent.  K replaced w/ 40 mEq K  P:  Continue w/ plan of care and notify team w/ changes/concerns.  PFTs will be done and will call in AM for time.  Stool sample needed

## 2018-10-23 NOTE — PLAN OF CARE
Problem: Patient Care Overview  Goal: Plan of Care/Patient Progress Review  VSS AOx4   Pt very pleasant and cooperative, expressed concern about LVAD vs txp.  US, CXR and echo done per orders.  Lasix gtt continues at 10 mg/hr.  Adequate UOP, UA sent.  K replaced w/ 40 mEq KStool sample needed Will continue with care plan, continue to monitor and notify MD's of any changes.

## 2018-10-23 NOTE — PROGRESS NOTES
Cardiology Progress Note  Danielito Chu MRN: [id number]  Age: 63 year old, : 1955  10/23/18               Subjective     Patient comfortable and has no issues this AM. Very happ curly is being taken care of here  Discussed possible VAD and he is open to work up however very open regarding the fact that he has minimal support from family. Has a significant other but endorses that she has a lot going on in her own life.   No other complaints today. Endorses he is taking in a significant amount of fluid - we discussed restricting this to 2L.          Objective     Vitals:  Temp:  [96.6  F (35.9  C)-97.6  F (36.4  C)] 97.6  F (36.4  C)  Pulse:  [81-86] 81  Heart Rate:  [74-87] 74  Resp:  [16-18] 16  BP: ()/(68-82) 94/69  SpO2:  [95 %-98 %] 98 %  MAP:     Vitals:    10/21/18 0433 10/22/18 0640 10/23/18 0322   Weight: 89.1 kg (196 lb 6.4 oz) 88.1 kg (194 lb 3.2 oz) 88.2 kg (194 lb 6.4 oz)       Gen: AA&Ox3, no acute distress  HEENT:MMM  BACK: no CVA tenderness, no midline bony tenderness  PULM/THORAX: Clear to auscultation bilaterally, no rales/stridor/wheezes  CV:RRR, S1 and S2 appreciated, no extra heart sounds, murmurs or rub auscultated. JVD to mid neck   ABD: obese, soft, nontender, nondistended. Normoactive bowel sounds x 4, no HSM appreciated.  EXT: No significant LE edema             Data:      Labs reviewed. Pertinent for :  K - 4.0, Na - 131 (downtrending), Cr - 1.1, Mg - 2.2     Imaging reviewed.          Medications     Medications    aspirin  81 mg Oral Daily     atorvastatin  10 mg Oral Daily     buPROPion  150 mg Oral Daily     digoxin  250 mcg Oral Daily     ranitidine  150 mg Oral BID     sodium chloride (PF)  10 mL Intracatheter Q7 Days     vitamin  B-1  100 mg Oral Daily       - MEDICATION INSTRUCTIONS -       furosemide (LASIX) infusion ADULT STANDARD 10 mg/hr (10/23/18 0739)         Changes Today:     - LVAD/transplant hybrid workup order set placed   -  Ongoing Diuresis. NO changes in diuretic regimen--- may require RHC for assessment in upcoming days.  - Please ensure patient is adherent to fluid restriction       Assessment and Plan:     Danielito Chu is a 63 year old with PMH of HFrEF 2/2 NICM (EF 15%) , s/p ICD, HTN , DM II, SHIRLEY who is presenting for decompensated HF and expedited VAD/Transplant work up.    #Decompensated HFrEF 2/2 NICM (EF 15%)   #S/P ICD   Likely related to non-medical adherence in the past couple of months. Patient initially diagnosed in 2017 - he did well for 7-8 months however started decompensating over the past couple of months.   RHC in 6/2018 showed elevated filling pressures and cardiopulmonary testing showed MVo2 9.3 (pred 30.4) suggestive of poor prognosis.       Will optimize medically , repeat RHC after diuresis and start VAD/Transplant work up.       - Diuresis: Lasix 120 mg --> lasix 5 mg/hr gtt (good output) --> 10/22 increase to 10mg and bolus 120mg  - BB: Hold PTA Metoprolol 25 mg-XL   - ACE/ARB: Hold PTA Entresto BID   - Digoxin 250 mcg qday   - Atorvastatin 10 mg Qday  - Aspirin 81 mg qday   - Will start transplant work up       #Depression / Adjustment disorder   - Start wellbutrin 150 mg qday   - Will consider psychiatry/psychology consult       #NSVT   #s/p ICD   Noted on most recent interrogation of ICD  - K>4, Mg>2       #DM II : sliding scale insulin       #SHIRLEY: CPAP       #HTN: PTA entersto and BB. Holding both for now       Prophylaxis:  DVT: Ambulate   GI: None  Disposition: Pending further diuresis and work up.   Code Status: Full     Patient was discussed with staff attending, Dr. Giancarlo Rehman  PGY-2 Internal Medicine  Cardiology Service

## 2018-10-23 NOTE — PLAN OF CARE
"Problem: Patient Care Overview  Goal: Plan of Care/Patient Progress Review  Outcome: No Change  BP 96/74 (BP Location: Left arm)  Temp 97.4  F (36.3  C) (Oral)  Resp 18  Ht 1.753 m (5' 9\")  Wt 88.1 kg (194 lb 3.2 oz)  SpO2 97%  BMI 28.68 kg/m2    Cared for pt [id number]  A/Ox4. Soft BPs. Denies being lightheaded or dizzy. Pt had a 10 beat run of V tach, asymtomatic and VSS. MD notified. Kayciel ordered and given. Lasix gtt continues at 10 mg/hr. Adequate UOP. Up ad moshe. Pt expressed frustration with hospital stay and stated that he feels like nothing is being done. Therapeutic communication given. No c/o chest pain, nausea, or SOB. Pt calls appropriately and is able to make needs known. Will continue to monitor and follow POC.       "

## 2018-10-23 NOTE — PROGRESS NOTES
CLINICAL NUTRITION SERVICES - ASSESSMENT NOTE     Nutrition Prescription    RECOMMENDATIONS FOR MDs/PROVIDERS TO ORDER:  Rec thiamine (100 mg/day) if pt has NYHA Class III-IV heart failure.        Recommendations already ordered by Registered Dietitian (RD):  Prn oral supplement order      Future/Additional Recommendations:  1. Continue current diet, as ordered. Continue fluid restriction as per team.   2. Monitor K+ labs, potential need for diet restriction if warranted.  3. Monitor BG control. Hgb A1c of 6.4 on 10/19/18 (DM II hx).   4. Order a multivitamin with minerals if oral intake decreases, to help meet micronutrient needs.   5. If pt has an LVAD placed and if he needs TFs, would rec place feeding tube (FT) in radiology, order XR Feeding Tube Placement, and initiate TFs, Impact Peptide 1.5 (immune modulating TF formula, high protein). Initiate TFs at 10 mL/hr, advancing rate by 10 mL Q 8 hrs (or per provider discretion, pending hemodynamic stability) to goal rate of 70 mL/hr. This provides 1680 mL TF, 2520 kcals (29 kcals/kg), 158 g PRO (1.8 g protein/kg), 1294 ml free H2O, 108 g Fat (50% from MCTs), 235 g CHO and no Fiber daily.          If begin tube feeds:    - Flush FT with 30 mL water Q 4 hrs for patency. Rec provider adjust free water flushes as needed, pending fluid status.   - Ensure K+/Mg++/Phos labs are ordered daily until TFs advance to goal infusion to evaluate for sx of refeeding with nutrition received. If lytes trend low, aggressively replace lytes.       - If not already ordered, order a multivitamin/mineral (certavite 15 mL/day via FT) to help ensure micronutrient needs being met with suspected hypermetabolic demands and potential interruptions to TF infusions.   - Monitor TF and possible need to adjust nutrition support regimen if necessary, pending medical course and nutrition status.       - If Impact Peptide 1.5 TF is started, order a baseline CRP lab and then CRP labs for the subsequent  "two Mondays.     - Check a metabolic cart study.     REASON FOR ASSESSMENT  Danielito Chu is a/an 63 year old male assessed by the dietitian for Provider Order - Heart Failure pre VAD planning    NUTRITION HISTORY  Per nutrition note 6/2018, \"Pt reports receiving low-sodium nutrition education in the past. Pt known to this service from prior admission. Per chart review, pt and family received nutrition education on 3/7/17. Per chart, medication non-compliance and dietary indiscretion PTA. Per pt's sister, pt lives alone and it is more difficult to eat low-sodium choices. Pt and sister are agreeable to additional nutrition education and they have some questions.\"  Per H & P, pt was admitted for decompensated heart failure. Note, h/o NICM, DM II, and small hiatal hernia. Chart indicates pt is \"eating things he knows he shouldn't be eating. He has been having a hard social situation from dealing with his partner, insurance, and employment.\" Pt feeling SOB. Chart indicates pt was ordered to take, noting, metformin and potassium chloride PTA. Chart indicates pt with non-medical adherence in the past couple months.   Pt states his sister helped him to make low-sodium foods in the past. Pt is aware of the low-sodium diet and fluid restriction and has the handouts given previously at home. States he will eat out occasionally to avoid doing dishes and making a mess. Has been watching what he eats and states he has been trying to lose wt. 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      CURRENT NUTRITION ORDERS  Diet: 2 g Sodium and 2000 mL Fluid Restriction  Intake/Tolerance: Good diet tolerance. Flowsheets indicate pt is consuming 100% of meals consistently and his appetite is good. Per RN 10/22, appetite good. Potential factor affecting oral intake includes pt to be potentially NPO at times for tests/procedures.     LABS  Labs reviewed    MEDICATIONS  Medications " "reviewed    ANTHROPOMETRICS  Height: 175.3 cm (5' 9\")  Most Recent Weight: 88.2 kg (194 lb 6.4 oz)    IBW: 72.7 kg - Pt is currently at 121% of IBW.   BMI: Overweight BMI 25-29.9. Current Body mass index is 28.71 kg/(m^2). - Pt is in ideal wt range if has LVAD surgery (or potential heart Tx if candidate in the future).  Weight History: 104.1 kg (6/15/17), 107.3 kg (10/19/17), 96.8 kg (4/4/18), 90.7 kg (7/19/18) - To date, pt has lost about 9% of his body wt over the past six months. Difficult to assess actual wt loss from fluid loss. Note, pt was trying to lose wt PTA. Pt reported a dry wt of ~200# (90.9 kg) as per H & P.   Dosing Weight: 86 kg (based on lowest wt this admission of 86.1 kg on 10/19/18)    ASSESSED NUTRITION NEEDS  Estimated Energy Needs: 0505-6490 kcals/day (25 - 30 kcals/kg)  Justification: Maintenance needs  Estimated Protein Needs:  grams protein/day (1.1 - 1.4 grams of pro/kg)  Justification: Increased needs with cardiac status (estimated needs would increase to 129-172 g protein/day, 1.5-2 g protein/kg, if pt receives an LVAD in the future)  Estimated Fluid Needs: 2000 mL/day    Justification: On a fluid restriction    PHYSICAL FINDINGS  See malnutrition section below.    MALNUTRITION  % Intake: Not meeting this criteria.     % Weight Loss: Difficult to assess actual wt loss from fluid loss. Pt was trying to lose wt PTA.   Subcutaneous Fat Loss: None observed  Muscle Loss: None observed  Fluid Accumulation/Edema: None noted  Malnutrition Diagnosis: Patient does not meet two of the above criteria necessary for diagnosing malnutrition    NUTRITION DIAGNOSIS  Predicted inadequate nutrient intake (protein-energy) related to potentially NPO at times for tests/procedures.     INTERVENTIONS  Implementation  Nutrition education for nutrition relationship to health/disease: Discussed nutrition education for potential upcoming LVAD surgery (pt in the LVAD workup process). Mentioned potential need " for feeding tube and TFs for nutrition support post-op, pt seemed a little surprised about this but seemed agreeable if TFs were needed. Discussed the importance of adequate oral intake pre-op and post-op. Emphasized protein intake and gave examples of high protein foods/beverages. Discussed and encouraged oral supplements post-op (mostly likely to try chocolate-flavored oral supplements). Rec continue low-sodium diet and fluid restriction at this time. Pt with no questions on current diet order. Pt seemed agreeable to nutrition plan if LVAD is placed in the future.   Medical food supplement therapy: Placed prn snack/supplement order. Pt may request snacks/supplements prn.    Collaboration with other providers: Paged team regarding rec for MD/provider to order.     Goals  Patient to consume % of nutritionally adequate meal trays TID, or the equivalent with supplements/snacks.     Monitoring/Evaluation  Progress toward goals will be monitored and evaluated per protocol.    Nutrition will continue to follow.      Rachel Bradshaw, MS, RD, LD, [facility]   6C Pgr: [phone]

## 2018-10-23 NOTE — PLAN OF CARE
AVSS, denies pain. Lasix gtt infusing at 10mg/hr. Urine output good. Appetite good.  K+ hemolyzed again this morning; redrawn with peripheral draw, result = 5.3 this morning and 3.8 at 1700 tonight.

## 2018-10-23 NOTE — PLAN OF CARE
Problem: Patient Care Overview  Goal: Plan of Care/Patient Progress Review  Vital signs stable, patient denies pain, up ad moshe and took shower at midnight. Asked for something for sleep, got 5 mg melatonin and later reported he slept soundly. Lasix drip at 10 mg/ hr. Urine output 975 ml overnight. Patent calm and pleasant, continue to monitor fluid balance and electrolytes.

## 2018-10-23 NOTE — PLAN OF CARE
Problem: Patient Care Overview  Goal: Plan of Care/Patient Progress Review  OT/CR 6C: Cx. Attempted pt x 2, initially he was OOR and on second attempt pt decline 2/2 fatigue. Unable to convince pt to participate with encouragement and education. Reschedule.

## 2018-10-23 NOTE — CONSULTS
Jeff Chu is already an established patient in the CORE/Heart Failure clinic.  Last appt with was Rosmery Fuchs NP in CORE on 10/17.  I spoke with him/her at the bedside today about his/her discharge plans.  I will schedule him for CORE follow-up when he is closer to discharge.        He/she was instructed on the importance of daily weights, 2 gm sodium diet, 2L fluid restriction and compliance with medications upon hospital discharge.  I instructed him to call with any questions or concerns, including any weight gain or loss of 2 or more pounds in 24 hours or 5 or more pounds in 1 week. I will follow-up with him at the time of appt, sooner if needed.  Thank you for the consult.       Albania Bravo RN  Cardiology Care Coordinator - [facility]   Questions and [phone]

## 2018-10-24 ENCOUNTER — RESULTS ONLY (OUTPATIENT)
Dept: OTHER | Facility: CLINIC | Age: 63
End: 2018-10-24

## 2018-10-24 DIAGNOSIS — I50.9 CHF (CONGESTIVE HEART FAILURE) (H): Primary | ICD-10-CM

## 2018-10-24 LAB
ABO + RH BLD: NORMAL
ABO + RH BLD: NORMAL
ACETAMINOPHEN QUAL: NEGATIVE
ALBUMIN SERPL-MCNC: 3.6 G/DL (ref 3.4–5)
ALP SERPL-CCNC: 77 U/L (ref 40–150)
ALT SERPL W P-5'-P-CCNC: 14 U/L (ref 0–70)
AMANTADINE: NEGATIVE
AMITRIPTYLINE QUAL: NEGATIVE
AMOXAPINE: NEGATIVE
AMPHETAMINES QUAL: NEGATIVE
ANION GAP SERPL CALCULATED.3IONS-SCNC: 10 MMOL/L (ref 3–14)
ANION GAP SERPL CALCULATED.3IONS-SCNC: 10 MMOL/L (ref 3–14)
ANION GAP SERPL CALCULATED.3IONS-SCNC: 11 MMOL/L (ref 3–14)
ANION GAP SERPL CALCULATED.3IONS-SCNC: 9 MMOL/L (ref 3–14)
APTT PPP: 36 SEC (ref 22–37)
AST SERPL W P-5'-P-CCNC: 30 U/L (ref 0–45)
ATROPINE: NEGATIVE
BACTERIA SPEC CULT: NO GROWTH
BASOPHILS # BLD AUTO: 0 10E9/L (ref 0–0.2)
BASOPHILS NFR BLD AUTO: 0.4 %
BENZODIAZ UR QL: NEGATIVE
BILIRUB SERPL-MCNC: 2.3 MG/DL (ref 0.2–1.3)
BLD GP AB SCN SERPL QL: NORMAL
BLOOD BANK CMNT PATIENT-IMP: NORMAL
BUN SERPL-MCNC: 29 MG/DL (ref 7–30)
BUN SERPL-MCNC: 30 MG/DL (ref 7–30)
BUN SERPL-MCNC: 30 MG/DL (ref 7–30)
BUN SERPL-MCNC: 31 MG/DL (ref 7–30)
CAFFEINE QUAL: POSITIVE
CALCIUM SERPL-MCNC: 8.8 MG/DL (ref 8.5–10.1)
CALCIUM SERPL-MCNC: 8.8 MG/DL (ref 8.5–10.1)
CALCIUM SERPL-MCNC: 9 MG/DL (ref 8.5–10.1)
CALCIUM SERPL-MCNC: 9 MG/DL (ref 8.5–10.1)
CANNABINOIDS UR QL SCN: POSITIVE
CARBAMAZEPINE QUAL: NEGATIVE
CARDIOLIPIN ANTIBODY IGG: <1.6 GPL-U/ML (ref 0–19.9)
CARDIOLIPIN ANTIBODY IGM: 2 MPL-U/ML (ref 0–19.9)
CHLORIDE SERPL-SCNC: 97 MMOL/L (ref 94–109)
CHLORIDE SERPL-SCNC: 99 MMOL/L (ref 94–109)
CHLORPHENIRAMINE: NEGATIVE
CHLORPROMAZINE: NEGATIVE
CHOLEST SERPL-MCNC: 67 MG/DL
CITALOPRAM QUAL: NEGATIVE
CLOMIPRAMINE QUAL: NEGATIVE
CMV IGG SERPL QL IA: >8 AI (ref 0–0.8)
CO2 SERPL-SCNC: 25 MMOL/L (ref 20–32)
CO2 SERPL-SCNC: 26 MMOL/L (ref 20–32)
CO2 SERPL-SCNC: 28 MMOL/L (ref 20–32)
CO2 SERPL-SCNC: 28 MMOL/L (ref 20–32)
COCAINE QUAL: NEGATIVE
COCAINE UR QL: NEGATIVE
CODEINE QUAL: NEGATIVE
CREAT SERPL-MCNC: 1.02 MG/DL (ref 0.66–1.25)
CREAT SERPL-MCNC: 1.07 MG/DL (ref 0.66–1.25)
CREAT SERPL-MCNC: 1.13 MG/DL (ref 0.66–1.25)
CREAT SERPL-MCNC: 1.14 MG/DL (ref 0.66–1.25)
CRP SERPL HS-MCNC: 5 MG/L
CRP SERPL-MCNC: 4.4 MG/L (ref 0–8)
DESIPRAMINE QUAL: NEGATIVE
DEXTROMETHORPHAN: NEGATIVE
DIFFERENTIAL METHOD BLD: ABNORMAL
DIPHENHYDRAMINE: NEGATIVE
DOXEPIN/METABOLITE: NEGATIVE
DOXYLAMINE: NEGATIVE
EBV VCA IGG SER QL IA: >8 AI (ref 0–0.8)
EOSINOPHIL # BLD AUTO: 0.2 10E9/L (ref 0–0.7)
EOSINOPHIL NFR BLD AUTO: 2.5 %
EPHEDRINE OR PSEUDO: NEGATIVE
ERYTHROCYTE [DISTWIDTH] IN BLOOD BY AUTOMATED COUNT: 16 % (ref 10–15)
ETHANOL UR QL SCN: NEGATIVE
FENTANYL QUAL: NEGATIVE
FERRITIN SERPL-MCNC: 67 NG/ML (ref 26–388)
FLUOXETINE AND METAB: NEGATIVE
GFR SERPL CREATININE-BSD FRML MDRD: 65 ML/MIN/1.7M2
GFR SERPL CREATININE-BSD FRML MDRD: 66 ML/MIN/1.7M2
GFR SERPL CREATININE-BSD FRML MDRD: 70 ML/MIN/1.7M2
GFR SERPL CREATININE-BSD FRML MDRD: 74 ML/MIN/1.7M2
GLUCOSE SERPL-MCNC: 135 MG/DL (ref 70–99)
GLUCOSE SERPL-MCNC: 137 MG/DL (ref 70–99)
GLUCOSE SERPL-MCNC: 154 MG/DL (ref 70–99)
GLUCOSE SERPL-MCNC: 85 MG/DL (ref 70–99)
HBA1C MFR BLD: 6.4 % (ref 0–5.6)
HBV CORE AB SERPL QL IA: NONREACTIVE
HBV SURFACE AB SERPL IA-ACNC: 0.45 M[IU]/ML
HBV SURFACE AG SERPL QL IA: NONREACTIVE
HCT VFR BLD AUTO: 53.7 % (ref 40–53)
HCV AB SERPL QL IA: NONREACTIVE
HDLC SERPL-MCNC: 34 MG/DL
HEMOCCULT STL QL: NEGATIVE
HGB BLD-MCNC: 17.1 G/DL (ref 13.3–17.7)
HGB FREE PLAS-MCNC: 240 MG/DL
HIV 1+2 AB+HIV1 P24 AG SERPL QL IA: NONREACTIVE
HSV1 IGG SERPL QL IA: >8 AI (ref 0–0.8)
HSV2 IGG SERPL QL IA: <0.2 AI (ref 0–0.8)
HYDROCODONE QUAL: NEGATIVE
HYDROMORPHONE QUAL: NEGATIVE
IBUPROFEN QUAL: NEGATIVE
IMIPRAMINE QUAL: NEGATIVE
IMM GRANULOCYTES # BLD: 0.1 10E9/L (ref 0–0.4)
IMM GRANULOCYTES NFR BLD: 0.6 %
INR PPP: 1.31 (ref 0.86–1.14)
INTERPRETATION ECG - MUSE: NORMAL
IRON SATN MFR SERPL: 17 % (ref 15–46)
IRON SATN MFR SERPL: NORMAL % (ref 15–46)
IRON SERPL-MCNC: 75 UG/DL (ref 35–180)
IRON SERPL-MCNC: NORMAL UG/DL (ref 35–180)
LAMOTRIGINE QUAL: NEGATIVE
LDH SERPL L TO P-CCNC: 254 U/L (ref 85–227)
LDH SERPL L TO P-CCNC: NORMAL U/L (ref 85–227)
LDLC SERPL CALC-MCNC: 17 MG/DL
LOXAPINE: NEGATIVE
LYMPHOCYTES # BLD AUTO: 2.2 10E9/L (ref 0.8–5.3)
LYMPHOCYTES NFR BLD AUTO: 28.1 %
MAGNESIUM SERPL-MCNC: 2.1 MG/DL (ref 1.6–2.3)
MAGNESIUM SERPL-MCNC: 2.2 MG/DL (ref 1.6–2.3)
MAPROTYLINE: NEGATIVE
MCH RBC QN AUTO: 31 PG (ref 26.5–33)
MCHC RBC AUTO-ENTMCNC: 31.8 G/DL (ref 31.5–36.5)
MCV RBC AUTO: 98 FL (ref 78–100)
MDMA QUAL: NEGATIVE
MEPERIDINE QUAL: NEGATIVE
METHAMPHETAMINE: NEGATIVE
METHODONE QUAL: NEGATIVE
MONOCYTES # BLD AUTO: 0.9 10E9/L (ref 0–1.3)
MONOCYTES NFR BLD AUTO: 10.7 %
MORPHINE QUAL: NEGATIVE
NEUTROPHILS # BLD AUTO: 4.6 10E9/L (ref 1.6–8.3)
NEUTROPHILS NFR BLD AUTO: 57.7 %
NICOTINE: NEGATIVE
NONHDLC SERPL-MCNC: 33 MG/DL
NORTRIPTYLINE QUAL: NEGATIVE
NRBC # BLD AUTO: 0 10*3/UL
NRBC BLD AUTO-RTO: 0 /100
NT-PROBNP SERPL-MCNC: 1595 PG/ML (ref 0–900)
OLANZAPINE QUAL: NEGATIVE
OPIATES UR QL SCN: NEGATIVE
OXYCODONE QUAL: NEGATIVE
PENTAZOCINE: NEGATIVE
PHENCYCLIDINE QUAL: NEGATIVE
PHENMETRAZINE: NEGATIVE
PHENTERMINE: NEGATIVE
PHENYLBUTAZONE: NEGATIVE
PHENYLPROPANOLAMINE: NEGATIVE
PHOSPHATE SERPL-MCNC: 3.7 MG/DL (ref 2.5–4.5)
PLATELET # BLD AUTO: 191 10E9/L (ref 150–450)
PLATELET # BLD EST: ABNORMAL 10*3/UL
POTASSIUM SERPL-SCNC: 3.8 MMOL/L (ref 3.4–5.3)
POTASSIUM SERPL-SCNC: 4 MMOL/L (ref 3.4–5.3)
POTASSIUM SERPL-SCNC: 4.7 MMOL/L (ref 3.4–5.3)
PROPOXPHENE QUAL: NEGATIVE
PROPRANOLOL QUAL: NEGATIVE
PROT SERPL-MCNC: 8 G/DL (ref 6.8–8.8)
PSA SERPL-ACNC: 0.17 UG/L (ref 0–4)
PSA SERPL-MCNC: 0.17 UG/L (ref 0–4)
PYRILAMINE: NEGATIVE
RBC # BLD AUTO: 5.51 10E12/L (ref 4.4–5.9)
SALICYLATE QUAL: NEGATIVE
SODIUM SERPL-SCNC: 132 MMOL/L (ref 133–144)
SODIUM SERPL-SCNC: 134 MMOL/L (ref 133–144)
SODIUM SERPL-SCNC: 134 MMOL/L (ref 133–144)
SODIUM SERPL-SCNC: 135 MMOL/L (ref 133–144)
SPECIMEN EXP DATE BLD: NORMAL
SPECIMEN SOURCE: NORMAL
T4 FREE SERPL-MCNC: 1.24 NG/DL (ref 0.76–1.46)
THEOBROMINE: POSITIVE
TIBC SERPL-MCNC: 432 UG/DL (ref 240–430)
TIBC SERPL-MCNC: NORMAL UG/DL (ref 240–430)
TOPIRAMATE QUAL: NEGATIVE
TRANSFERRIN SERPL-MCNC: 317 MG/DL (ref 210–360)
TRIGL SERPL-MCNC: 80 MG/DL
TRIMIPRAMINE QUAL: NEGATIVE
TSH SERPL DL<=0.005 MIU/L-ACNC: 6.66 MU/L (ref 0.4–4)
URATE SERPL-MCNC: 12.9 MG/DL (ref 3.5–7.2)
VENLAFAXINE QUAL: NEGATIVE
VIT B12 SERPL-MCNC: 491 PG/ML (ref 193–986)
VZV IGG SER QL IA: 5 AI (ref 0–0.8)
WBC # BLD AUTO: 7.9 10E9/L (ref 4–11)

## 2018-10-24 PROCEDURE — 94150 VITAL CAPACITY TEST: CPT | Mod: ZF

## 2018-10-24 PROCEDURE — 80053 COMPREHEN METABOLIC PANEL: CPT | Performed by: STUDENT IN AN ORGANIZED HEALTH CARE EDUCATION/TRAINING PROGRAM

## 2018-10-24 PROCEDURE — 86787 VARICELLA-ZOSTER ANTIBODY: CPT | Performed by: STUDENT IN AN ORGANIZED HEALTH CARE EDUCATION/TRAINING PROGRAM

## 2018-10-24 PROCEDURE — 86696 HERPES SIMPLEX TYPE 2 TEST: CPT | Performed by: STUDENT IN AN ORGANIZED HEALTH CARE EDUCATION/TRAINING PROGRAM

## 2018-10-24 PROCEDURE — 86900 BLOOD TYPING SEROLOGIC ABO: CPT | Performed by: STUDENT IN AN ORGANIZED HEALTH CARE EDUCATION/TRAINING PROGRAM

## 2018-10-24 PROCEDURE — 86480 TB TEST CELL IMMUN MEASURE: CPT | Performed by: STUDENT IN AN ORGANIZED HEALTH CARE EDUCATION/TRAINING PROGRAM

## 2018-10-24 PROCEDURE — 86803 HEPATITIS C AB TEST: CPT | Performed by: STUDENT IN AN ORGANIZED HEALTH CARE EDUCATION/TRAINING PROGRAM

## 2018-10-24 PROCEDURE — 40000802 ZZH SITE CHECK

## 2018-10-24 PROCEDURE — 94729 DIFFUSING CAPACITY: CPT | Mod: ZF

## 2018-10-24 PROCEDURE — 84153 ASSAY OF PSA TOTAL: CPT | Performed by: STUDENT IN AN ORGANIZED HEALTH CARE EDUCATION/TRAINING PROGRAM

## 2018-10-24 PROCEDURE — 83615 LACTATE (LD) (LDH) ENZYME: CPT | Performed by: STUDENT IN AN ORGANIZED HEALTH CARE EDUCATION/TRAINING PROGRAM

## 2018-10-24 PROCEDURE — 83051 HEMOGLOBIN PLASMA: CPT | Performed by: STUDENT IN AN ORGANIZED HEALTH CARE EDUCATION/TRAINING PROGRAM

## 2018-10-24 PROCEDURE — 84443 ASSAY THYROID STIM HORMONE: CPT | Performed by: STUDENT IN AN ORGANIZED HEALTH CARE EDUCATION/TRAINING PROGRAM

## 2018-10-24 PROCEDURE — 84466 ASSAY OF TRANSFERRIN: CPT | Performed by: STUDENT IN AN ORGANIZED HEALTH CARE EDUCATION/TRAINING PROGRAM

## 2018-10-24 PROCEDURE — 85597 PHOSPHOLIPID PLTLT NEUTRALIZ: CPT | Performed by: STUDENT IN AN ORGANIZED HEALTH CARE EDUCATION/TRAINING PROGRAM

## 2018-10-24 PROCEDURE — 87340 HEPATITIS B SURFACE AG IA: CPT | Performed by: STUDENT IN AN ORGANIZED HEALTH CARE EDUCATION/TRAINING PROGRAM

## 2018-10-24 PROCEDURE — 85730 THROMBOPLASTIN TIME PARTIAL: CPT | Performed by: STUDENT IN AN ORGANIZED HEALTH CARE EDUCATION/TRAINING PROGRAM

## 2018-10-24 PROCEDURE — 86140 C-REACTIVE PROTEIN: CPT | Performed by: STUDENT IN AN ORGANIZED HEALTH CARE EDUCATION/TRAINING PROGRAM

## 2018-10-24 PROCEDURE — 86695 HERPES SIMPLEX TYPE 1 TEST: CPT | Performed by: STUDENT IN AN ORGANIZED HEALTH CARE EDUCATION/TRAINING PROGRAM

## 2018-10-24 PROCEDURE — 83540 ASSAY OF IRON: CPT | Performed by: STUDENT IN AN ORGANIZED HEALTH CARE EDUCATION/TRAINING PROGRAM

## 2018-10-24 PROCEDURE — 80061 LIPID PANEL: CPT | Performed by: STUDENT IN AN ORGANIZED HEALTH CARE EDUCATION/TRAINING PROGRAM

## 2018-10-24 PROCEDURE — 84132 ASSAY OF SERUM POTASSIUM: CPT | Performed by: STUDENT IN AN ORGANIZED HEALTH CARE EDUCATION/TRAINING PROGRAM

## 2018-10-24 PROCEDURE — 00000401 ZZHCL STATISTIC THROMBIN TIME NC: Performed by: STUDENT IN AN ORGANIZED HEALTH CARE EDUCATION/TRAINING PROGRAM

## 2018-10-24 PROCEDURE — 99233 SBSQ HOSP IP/OBS HIGH 50: CPT | Mod: GC | Performed by: INTERNAL MEDICINE

## 2018-10-24 PROCEDURE — 86665 EPSTEIN-BARR CAPSID VCA: CPT | Performed by: STUDENT IN AN ORGANIZED HEALTH CARE EDUCATION/TRAINING PROGRAM

## 2018-10-24 PROCEDURE — 85613 RUSSELL VIPER VENOM DILUTED: CPT | Performed by: STUDENT IN AN ORGANIZED HEALTH CARE EDUCATION/TRAINING PROGRAM

## 2018-10-24 PROCEDURE — 21400006 ZZH R&B CCU INTERMEDIATE UMMC

## 2018-10-24 PROCEDURE — 86850 RBC ANTIBODY SCREEN: CPT | Performed by: STUDENT IN AN ORGANIZED HEALTH CARE EDUCATION/TRAINING PROGRAM

## 2018-10-24 PROCEDURE — 25000132 ZZH RX MED GY IP 250 OP 250 PS 637: Performed by: STUDENT IN AN ORGANIZED HEALTH CARE EDUCATION/TRAINING PROGRAM

## 2018-10-24 PROCEDURE — G0103 PSA SCREENING: HCPCS | Performed by: STUDENT IN AN ORGANIZED HEALTH CARE EDUCATION/TRAINING PROGRAM

## 2018-10-24 PROCEDURE — 86141 C-REACTIVE PROTEIN HS: CPT | Performed by: STUDENT IN AN ORGANIZED HEALTH CARE EDUCATION/TRAINING PROGRAM

## 2018-10-24 PROCEDURE — 85732 THROMBOPLASTIN TIME PARTIAL: CPT | Performed by: STUDENT IN AN ORGANIZED HEALTH CARE EDUCATION/TRAINING PROGRAM

## 2018-10-24 PROCEDURE — 84100 ASSAY OF PHOSPHORUS: CPT | Performed by: STUDENT IN AN ORGANIZED HEALTH CARE EDUCATION/TRAINING PROGRAM

## 2018-10-24 PROCEDURE — 84550 ASSAY OF BLOOD/URIC ACID: CPT | Performed by: STUDENT IN AN ORGANIZED HEALTH CARE EDUCATION/TRAINING PROGRAM

## 2018-10-24 PROCEDURE — 85025 COMPLETE CBC W/AUTO DIFF WBC: CPT | Performed by: STUDENT IN AN ORGANIZED HEALTH CARE EDUCATION/TRAINING PROGRAM

## 2018-10-24 PROCEDURE — 86147 CARDIOLIPIN ANTIBODY EA IG: CPT | Performed by: STUDENT IN AN ORGANIZED HEALTH CARE EDUCATION/TRAINING PROGRAM

## 2018-10-24 PROCEDURE — 25000128 H RX IP 250 OP 636: Performed by: STUDENT IN AN ORGANIZED HEALTH CARE EDUCATION/TRAINING PROGRAM

## 2018-10-24 PROCEDURE — 86901 BLOOD TYPING SEROLOGIC RH(D): CPT | Performed by: STUDENT IN AN ORGANIZED HEALTH CARE EDUCATION/TRAINING PROGRAM

## 2018-10-24 PROCEDURE — 82728 ASSAY OF FERRITIN: CPT | Performed by: STUDENT IN AN ORGANIZED HEALTH CARE EDUCATION/TRAINING PROGRAM

## 2018-10-24 PROCEDURE — 40000866 ZZHCL STATISTIC HIV 1/2 ANTIGEN/ANTIBODY PRETRANSPLANT ONLY: Performed by: STUDENT IN AN ORGANIZED HEALTH CARE EDUCATION/TRAINING PROGRAM

## 2018-10-24 PROCEDURE — 94726 PLETHYSMOGRAPHY LUNG VOLUMES: CPT | Mod: ZF

## 2018-10-24 PROCEDURE — 84439 ASSAY OF FREE THYROXINE: CPT | Performed by: STUDENT IN AN ORGANIZED HEALTH CARE EDUCATION/TRAINING PROGRAM

## 2018-10-24 PROCEDURE — 83735 ASSAY OF MAGNESIUM: CPT | Performed by: STUDENT IN AN ORGANIZED HEALTH CARE EDUCATION/TRAINING PROGRAM

## 2018-10-24 PROCEDURE — 36415 COLL VENOUS BLD VENIPUNCTURE: CPT | Performed by: STUDENT IN AN ORGANIZED HEALTH CARE EDUCATION/TRAINING PROGRAM

## 2018-10-24 PROCEDURE — 83880 ASSAY OF NATRIURETIC PEPTIDE: CPT | Performed by: STUDENT IN AN ORGANIZED HEALTH CARE EDUCATION/TRAINING PROGRAM

## 2018-10-24 PROCEDURE — 25000125 ZZHC RX 250: Performed by: INTERNAL MEDICINE

## 2018-10-24 PROCEDURE — 86704 HEP B CORE ANTIBODY TOTAL: CPT | Performed by: STUDENT IN AN ORGANIZED HEALTH CARE EDUCATION/TRAINING PROGRAM

## 2018-10-24 PROCEDURE — 83550 IRON BINDING TEST: CPT | Performed by: STUDENT IN AN ORGANIZED HEALTH CARE EDUCATION/TRAINING PROGRAM

## 2018-10-24 PROCEDURE — 85610 PROTHROMBIN TIME: CPT | Performed by: STUDENT IN AN ORGANIZED HEALTH CARE EDUCATION/TRAINING PROGRAM

## 2018-10-24 PROCEDURE — 25000128 H RX IP 250 OP 636: Performed by: INTERNAL MEDICINE

## 2018-10-24 PROCEDURE — 83036 HEMOGLOBIN GLYCOSYLATED A1C: CPT | Performed by: STUDENT IN AN ORGANIZED HEALTH CARE EDUCATION/TRAINING PROGRAM

## 2018-10-24 PROCEDURE — 82607 VITAMIN B-12: CPT | Performed by: STUDENT IN AN ORGANIZED HEALTH CARE EDUCATION/TRAINING PROGRAM

## 2018-10-24 PROCEDURE — 80048 BASIC METABOLIC PNL TOTAL CA: CPT | Performed by: STUDENT IN AN ORGANIZED HEALTH CARE EDUCATION/TRAINING PROGRAM

## 2018-10-24 PROCEDURE — 94375 RESPIRATORY FLOW VOLUME LOOP: CPT | Mod: ZF

## 2018-10-24 PROCEDURE — 86644 CMV ANTIBODY: CPT | Performed by: STUDENT IN AN ORGANIZED HEALTH CARE EDUCATION/TRAINING PROGRAM

## 2018-10-24 PROCEDURE — 86706 HEP B SURFACE ANTIBODY: CPT | Performed by: STUDENT IN AN ORGANIZED HEALTH CARE EDUCATION/TRAINING PROGRAM

## 2018-10-24 PROCEDURE — 36592 COLLECT BLOOD FROM PICC: CPT | Performed by: STUDENT IN AN ORGANIZED HEALTH CARE EDUCATION/TRAINING PROGRAM

## 2018-10-24 RX ORDER — FUROSEMIDE 10 MG/ML
80 INJECTION INTRAMUSCULAR; INTRAVENOUS ONCE
Status: COMPLETED | OUTPATIENT
Start: 2018-10-24 | End: 2018-10-24

## 2018-10-24 RX ORDER — POTASSIUM CHLORIDE 7.45 MG/ML
10 INJECTION INTRAVENOUS
Status: DISCONTINUED | OUTPATIENT
Start: 2018-10-24 | End: 2018-10-30 | Stop reason: HOSPADM

## 2018-10-24 RX ORDER — POTASSIUM CHLORIDE 750 MG/1
20-40 TABLET, EXTENDED RELEASE ORAL
Status: DISCONTINUED | OUTPATIENT
Start: 2018-10-24 | End: 2018-10-30 | Stop reason: HOSPADM

## 2018-10-24 RX ORDER — FUROSEMIDE 10 MG/ML
120 INJECTION INTRAMUSCULAR; INTRAVENOUS ONCE
Status: COMPLETED | OUTPATIENT
Start: 2018-10-24 | End: 2018-10-24

## 2018-10-24 RX ORDER — POTASSIUM CHLORIDE 29.8 MG/ML
20 INJECTION INTRAVENOUS
Status: DISCONTINUED | OUTPATIENT
Start: 2018-10-24 | End: 2018-10-30 | Stop reason: HOSPADM

## 2018-10-24 RX ORDER — POTASSIUM CHLORIDE 1.5 G/1.58G
20-40 POWDER, FOR SOLUTION ORAL
Status: DISCONTINUED | OUTPATIENT
Start: 2018-10-24 | End: 2018-10-30 | Stop reason: HOSPADM

## 2018-10-24 RX ORDER — POTASSIUM CL/LIDO/0.9 % NACL 10MEQ/0.1L
10 INTRAVENOUS SOLUTION, PIGGYBACK (ML) INTRAVENOUS
Status: DISCONTINUED | OUTPATIENT
Start: 2018-10-24 | End: 2018-10-30 | Stop reason: HOSPADM

## 2018-10-24 RX ADMIN — BUPROPION HYDROCHLORIDE 150 MG: 150 TABLET, FILM COATED, EXTENDED RELEASE ORAL at 09:01

## 2018-10-24 RX ADMIN — RANITIDINE 150 MG: 150 TABLET, FILM COATED ORAL at 19:07

## 2018-10-24 RX ADMIN — ATORVASTATIN CALCIUM 10 MG: 10 TABLET, FILM COATED ORAL at 19:07

## 2018-10-24 RX ADMIN — FUROSEMIDE 15 MG/HR: 10 INJECTION, SOLUTION INTRAVENOUS at 10:49

## 2018-10-24 RX ADMIN — POTASSIUM CHLORIDE 20 MEQ: 750 TABLET, EXTENDED RELEASE ORAL at 19:07

## 2018-10-24 RX ADMIN — FUROSEMIDE 80 MG: 10 INJECTION, SOLUTION INTRAVENOUS at 10:25

## 2018-10-24 RX ADMIN — RANITIDINE 150 MG: 150 TABLET, FILM COATED ORAL at 08:41

## 2018-10-24 RX ADMIN — FUROSEMIDE 10 MG/HR: 10 INJECTION, SOLUTION INTRAVENOUS at 02:12

## 2018-10-24 RX ADMIN — Medication 100 MG: at 08:41

## 2018-10-24 RX ADMIN — POTASSIUM CHLORIDE 20 MEQ: 750 TABLET, EXTENDED RELEASE ORAL at 16:17

## 2018-10-24 RX ADMIN — ASPIRIN 81 MG: 81 TABLET, COATED ORAL at 08:41

## 2018-10-24 RX ADMIN — DIGOXIN 250 MCG: 125 TABLET ORAL at 08:41

## 2018-10-24 RX ADMIN — FUROSEMIDE 120 MG: 10 INJECTION, SOLUTION INTRAVENOUS at 19:07

## 2018-10-24 ASSESSMENT — ACTIVITIES OF DAILY LIVING (ADL)
ADLS_ACUITY_SCORE: 10

## 2018-10-24 NOTE — PROGRESS NOTES
CLINICAL NUTRITION SERVICES    Received provider order for heart failure pre VAD planning, hybrid transplant.    Pt was seen yesterday for Heart Failure pre VAD planning. See nutrition assessment note on 10/23 for all details.     INTERVENTIONS:  Implementation:  Nutrition Education (pt and sister, Fani): Provided brief verbal and written nutrition education on post-Tx nutrition guidelines. Emphasis was placed on food safety post-op Tx if pt does does have a Tx. Answered questions. Pt to have review of post-op Tx nutrition education after transplant, as able.    2. Handout given: Nutrition Care Manual handout on Organ Transplant Nutrition Therapy    Follow up/Monitoring:  Will continue to follow pt.    Rachel Bradshaw, MS, RD, LD, [facility]   6C Pgr: [phone]

## 2018-10-24 NOTE — PLAN OF CARE
Problem: Patient Care Overview  Goal: Plan of Care/Patient Progress Review    D: Pt with NICM admitted with volume overload, undergoing LVAD/tx w/u.  I/A: Lasix gtt at 10mg/hr. Adequate but unimpressive UOP. +BM. Up walking independently. SR with PVC's on monitor. Denies pain. PRN melatonin given at HS. Pt did not sleep soundly.  P: Continue workup, diuresis, plan for RHC soon. Monitor and assess pt condition and contact treatment team with questions or concerns.

## 2018-10-24 NOTE — PROGRESS NOTES
Lasix gtt increased to 15mg/hr today after 80mg IV bolus given w/moderate urine outpt. Diet changed to 1g Na and pt made aware of this; reviewed FR as well with pt and sister. PFTs completed as part of VAD work-up.  Currently waiting for an ICU bed for swan procedure; but pt aware that bed availability is unlikely today.   Denies pain; independent with cares in room. Continue to record strict I/Os.

## 2018-10-24 NOTE — PROGRESS NOTES
Cardiology Progress Note  Danielito Chu MRN: [id number]  Age: 63 year old, : 1955  10/24/18              Changes Today:     - Weight Stable, JVP still elevated. Continue diuresis and increase lasix gtt to 15   - Re informed patient regarding fluid restriction   - Decrease Na restriction to 1gm  - Discussion with patient's sister and patient today regarding wishes and overall plan of care.   - Mental health consult       Assessment and Plan:     Danielito Chu is a 63 year old with PMH of HFrEF 2/2 NICM (EF 15%) , s/p ICD, HTN , DM II, SHIRLEY who is presenting for decompensated HF and expedited VAD/Transplant work up.     #Decompensated HFrEF 2/2 NICM (EF 15%)   #S/P ICD   Likely related to non-medical adherence in the past couple of months. Patient initially diagnosed in  - he did well for 7-8 months however started decompensating over the past couple of months.   RHC in 2018 showed elevated filling pressures and cardiopulmonary testing showed MVo2 9.3 (pred 30.4) suggestive of poor prognosis.       Will optimize medically , repeat RHC after diuresis and start VAD/Transplant work up.       - Diuresis: Lasix 120 mg --> lasix 5 mg/hr gtt (good output) --> 10/22 increase to 10mg and bolus 120mg --->10/24 Increase gtt to 15mg/h and Rebolus with 80mg   - BB: Hold PTA Metoprolol 25 mg-XL   - ACE/ARB: Hold PTA Entresto BID   - Digoxin 250 mcg qday   - Atorvastatin 10 mg Qday  - Aspirin 81 mg qday   - Will start transplant work up and plan for RHC when ICU bed available       #Depression / Adjustment disorder   - Start wellbutrin 150 mg qday   - Will consider psychiatry/psychology consult       #NSVT   #s/p ICD   Noted on most recent interrogation of ICD  - K>4, Mg>2       #DM II : sliding scale insulin       #SHIRLEY: CPAP       #HTN: PTA entersto and BB. Holding both for now       Prophylaxis:  DVT: Ambulate   GI: None  Disposition: Pending further diuresis and work up.  "  Code Status: Full      Patient was discussed with staff attending, Dr. Giancarlo Rehman  PGY-2 Internal Medicine  Cardiology Service             Subjective     Patient seen with sister at bedside. Discussed overall plan with sister at length.  Patient then taken to PFTs thus was unable to explain fully to patient however plan to return     Explained at length to sister that  is at a new place with respect to his heart failure and that over time his heart will worsen. IN order to plan for the future, given current poor heart function and squeeze, we are evaluated him for an LVAD. She expressed concerns that the patient feels he does not wish for one however she believs it may help to discuss with other individuals who have had LVADs. I reassured her that this is a fluid and dynamic process and in order to be considered much of the work up has to occur first. IN addition, patient will meet other LVAD recipients during the process. Ultimately I explained that a right heart cath offers us most information however there are no ICU beds at this time. The sister was understanding of this.     Patient very frustrated that he is not having adequate urine output; I mentioned he has about 3L urine output while on lasix gtt at 10. We plan to increase to 15mg/h today and reassess. Patient frustrated stating \"why don't we just go up more and fast\" and I mentioned that he is at a high dose and uptitrating slowly and safely requires time. He has been as high as 20mg/h of lasix in the past and I reassured him that we can increase pending urine output.   We had a long discussion regarding his concernsregarding VAD and social support. Overall tells me he still \"has the fight to live\" but a lot of stressors. I offered him a mental health consult.     Long discussion with sister as well regarding Nutrition and she would like more emphasis placed on such. Ultimately I mentioned that I will discuss this with the " patient. Will decrease to 1gm Na restriction for now.           Objective     Vitals:  Temp:  [96.7  F (35.9  C)-97.4  F (36.3  C)] 97.2  F (36.2  C)  Heart Rate:  [73-98] 85  Resp:  [16-18] 16  BP: ()/(67-77) 107/77  SpO2:  [94 %-98 %] 98 %     Vitals:    10/22/18 0640 10/23/18 0322 10/24/18 0010   Weight: 88.1 kg (194 lb 3.2 oz) 88.2 kg (194 lb 6.4 oz) 88.3 kg (194 lb 9.6 oz)     Gen: AA&Ox3, no acute distress  HEENT:AT/ NC, PERRL b/l, EOM grossly intact, mucous membranes pink, moist without plaque or exudate  BACK: no CVA tenderness, no midline bony tenderness  PULM/THORAX: Clear to auscultation bilaterally, no rales/stridor/wheezes  CV:RRR, S1 and S2 appreciated, no extra heart sounds, murmurs or rub auscultated. JVD to mid neck  ABD: soft, nt and nd   EXT:  1+ le edema           Data:      Labs reviewed. Pertinent for :           Medications     Medications    aspirin  81 mg Oral Daily     atorvastatin  10 mg Oral Daily     buPROPion  150 mg Oral Daily     digoxin  250 mcg Oral Daily     ranitidine  150 mg Oral BID     sodium chloride (PF)  10 mL Intracatheter Q7 Days     vitamin  B-1  100 mg Oral Daily       - MEDICATION INSTRUCTIONS -       furosemide 15 mg/hr (10/24/18 1049)

## 2018-10-24 NOTE — CONSULTS
Patient of Dr. Lorena Watkins seen in the hospital on inpatient unit 6C for psychosocial assessment.   90 minutes spent with patient. 100% of visit consisted of counseling related to issues surrounding LVAD and Heart Transplant.    Psychosocial Assessment   Name: Danielito Chu     MRN:  [id number]        Patient Name / Age / Race: Danielito Chu 63 year old  and    Source of Information: Patient   : Pratima Mon   Interview Date: October 23, 2018   Reason for Referral: Heart Transplant and Mechanical Circulatory Support     Current Living Situation   Location ([facility] [address] [place] [id number]   With Whom: Living arrangements - the patient lives alone.   Type of Home[place] upper level and 52 steps down to [place]. Has lived here since 2012. He also owns a home with Girlfriend in [place]   Working Phone? Yes    Three Pronged Outlet? Forgot to ask-will do so this week prior to discharge   Distance from Hospital: 3.5 hour drive ([place] of [place])   Need to Relocate Post Surgery? Yes    Discussed? Yes ;written material provided on hotels, extended stay suites and furnished apartments     Vocational/Employment/Financial   Employment: Disabled, but on leave of absence since March 2018-On Long-term disability through employer. 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   Education: High School with some college classes   ? No   Income: savings and disabilty insurance   Insurance: Private Insurance   Name of Private Insurance: Preferred One-just took over paying COBRA payments. He reports he can afford COBRA   Medication Coverage: Preferred One-Patient reports good medication coverage    In current situation, pt. can afford medication and  supply costs:  Yes    Other Financial Concerns/Issues: None-reports COBRA premiums are affordable and [place].     Family/Social Support   Marital Status:  after 35 years of marriage to Paola-Still good friends   Partner Name: Current partner name is Yanna Ferrari   Length of Time : They have been together for 9 years, but knew eachother well in High School   Partner s Employment:  for [employer] in [place]. Just took buy-out and going back to school to do something a little different than her last job-still in [place], however   Relationship: Stable/supportive; but long-distance   Children: No children   Parents:    Siblings: Brother-Brenden (they talk by phone everyday and he lives 10 miles away) Sister, Fani-lives in [place], but also talk everyday. Sister retired and travels a lot to come see the patient.   Other Family or Friends Close by: Lots of friends   Support System: available, helpful   Primary Support Person: He reports his Sister, Fani, would be the one to come here and learn LVAD and be his primary caregiver 24 hrs/day for 30 days. He also indicates that Brother Brenden would be the person to change his dressing every day long-term. His girlfriend will do it as well long-term when they are with each other   Issues: None-Both Brother and Sister called patient while this writer was in the room conducting interview     Activities/Functional Ability   Current Level: ambulatory and independent with ADL's   Daily Activities: Able to do the steps at home and manage household chores. Hires out the lawn work and snowblowing/shoveling in the Winter   Transportation: self      Medical Status   Patient s understanding of need for surgical intervention: Good   Advanced Directive? No    Details: Would want his Sister and Brother to be his decision-makers. Provided patient with blank copy and explanation of completion process and  importance.   Adherence History: Reports ability to follow all medical directives, but mentioned that he has forgotten medications a few times recently (with not working, finding himself bored a lot and forgets his medications)   Ability to Adhere to Complex Medical Regime: Yes     Behavioral   Chemical Dependency Issues: No, reports drinking was never an issue in his life. Denied all illicit drug use except very recreational use of marijuana. Last smoked with friends 2 months ago.   Smoker? Yes ;History of 1 pack every 3-4 days. Now, smokes random cigarettes once in a while with friends. Last smoked cigarette one month ago   Psychiatric impairment: No, but appears to have some mild symptoms of depression recently with his inability to work and boredom   Coping Style/Strategies: He reports that he loves to read and educate himself with documentaries, etc... To cope with stress. Loves to spend time in his [place] Home. Very social cele and loves to be around people. Feels a little isolated over last 6 months and lonely. Good coping skills, however, and bright affect.   Recent Legal History: No   Teaching Completed During Assessment Related To Transplant and Mechanical Circulatory Support: 1. Housing and relocation needs post surgery.  2. Caregiver needs post surgery.  3. Financial issues related to surgery.  4. Risks of alcohol use post surgery.  5. Common psychosocial stressors pre/post surgery.  6. Support group availability.   Psychosocial Risks Reviewed Related To Transplant and Mechanical Circulatory Support: 1. Increased stress related to your emotional, family, social, employment, or financial situation.  2. Affect on work and/or disability benefits.  3. Affect on future health and life insurance.  4. Outcome expectations may not be met.  5. Mental Health risks: anxiety, depression, PTSD, guilt, grief and chronic fatigue.     Observed Behavior   Well informed? Yes  Angry? No   Process info well? Yes   Hostile? No   Evasive? No Oriented X3? Yes    Cautious/Suspicious? No Motivated? Yes    Appropriate Behavior? Yes  Depressed? No   Appropriate Affect? Yes  Anxious? No   Interview Observations: Very engaged in conversation. Good eye contact. Lots of dialogue and seemed to problem solve well.     Selection Criteria Met   Plan for Support Yes ; but would like to have conversation with Sister.   Chemical Dependence Yes    Smoking No; not for transplant, but yes for VAD   Mental Health Yes    Adequate Finances Yes      Risk Issues:    Smoking random cigarettes and recreational use of marijuana will eliminate him from transplant listing. Will need abstinence for at least 3 months.    Final Evaluation/Assessment:     Mr. Chu was friendly and forthcoming with information. He was quite talkative and seems to still be in the decision-making phase about whether he would choose VAD or transplant. Reports that he feels too well for either intervention right now, but verbalized understanding toward the need to start evaluation. States family really wants him to pursue VAD and transplant. He has met another VAD patient and he reports that it helped him better understand the quality of life factor post-VAD. He did not attend support group this week, however. Mr. Chu seems to have good family support. States Sister will come from [place] to be 24-hour caregiver in the [facility] for 30 days. [facility] Brother will help out and change dressing for him when Girlfriend not around. Has short-term and long-term support. He reports life changes since not working, but no financial worries and can afford local housing. Has insurance to pay for surgery and supplies. No significant MH history or chemical dependency issues. Will need to remain abstinent from cigarettes and marijuana if he would like to be listed for transplant. Not sure patient is able to do this per his report. No psychosocial issues toward pursuing LVAD at this  point.    Frailty Score = 2    Patient understands risks and benefits of Heart Transplant and Mechanical Circulatory Support: Yes     Recommendation:    Good VAD candidate-Not able to be listed for transplant until abstinent for 3-6 months from cigarettes and marijuana.    Signature: Pratima Mon     Title: Licensed Independent Clinical                Interview Date: October 23, 2018

## 2018-10-24 NOTE — PLAN OF CARE
Problem: Patient Care Overview  Goal: Plan of Care/Patient Progress Review  6C/OT: Cancel. Pt declining therapy today due to fatigue. Will reschedule for tomorrow.

## 2018-10-25 LAB
ANION GAP SERPL CALCULATED.3IONS-SCNC: 12 MMOL/L (ref 3–14)
ANION GAP SERPL CALCULATED.3IONS-SCNC: 3 MMOL/L (ref 3–14)
ANION GAP SERPL CALCULATED.3IONS-SCNC: NORMAL MMOL/L (ref 6–17)
BASE EXCESS BLDV CALC-SCNC: 6.3 MMOL/L
BASE EXCESS BLDV CALC-SCNC: 6.9 MMOL/L
BASOPHILS # BLD AUTO: 0.1 10E9/L (ref 0–0.2)
BASOPHILS NFR BLD AUTO: 0.8 %
BUN SERPL-MCNC: 28 MG/DL (ref 7–30)
BUN SERPL-MCNC: 30 MG/DL (ref 7–30)
BUN SERPL-MCNC: NORMAL MG/DL (ref 7–30)
CALCIUM SERPL-MCNC: 8.9 MG/DL (ref 8.5–10.1)
CALCIUM SERPL-MCNC: 9.1 MG/DL (ref 8.5–10.1)
CALCIUM SERPL-MCNC: NORMAL MG/DL (ref 8.5–10.1)
CHLORIDE SERPL-SCNC: 96 MMOL/L (ref 94–109)
CHLORIDE SERPL-SCNC: 99 MMOL/L (ref 94–109)
CHLORIDE SERPL-SCNC: NORMAL MMOL/L (ref 94–109)
CO2 SERPL-SCNC: 26 MMOL/L (ref 20–32)
CO2 SERPL-SCNC: 30 MMOL/L (ref 20–32)
CO2 SERPL-SCNC: NORMAL MMOL/L (ref 20–32)
CREAT SERPL-MCNC: 0.96 MG/DL (ref 0.66–1.25)
CREAT SERPL-MCNC: 1.15 MG/DL (ref 0.66–1.25)
CREAT SERPL-MCNC: NORMAL MG/DL (ref 0.66–1.25)
DIFFERENTIAL METHOD BLD: ABNORMAL
EOSINOPHIL # BLD AUTO: 0.2 10E9/L (ref 0–0.7)
EOSINOPHIL NFR BLD AUTO: 2.2 %
ERYTHROCYTE [DISTWIDTH] IN BLOOD BY AUTOMATED COUNT: 16.1 % (ref 10–15)
GAMMA INTERFERON BACKGROUND BLD IA-ACNC: 0.14 IU/ML
GFR SERPL CREATININE-BSD FRML MDRD: 64 ML/MIN/1.7M2
GFR SERPL CREATININE-BSD FRML MDRD: 80 ML/MIN/1.7M2
GFR SERPL CREATININE-BSD FRML MDRD: NORMAL ML/MIN/1.7M2
GLUCOSE BLDC GLUCOMTR-MCNC: 126 MG/DL (ref 70–99)
GLUCOSE SERPL-MCNC: 155 MG/DL (ref 70–99)
GLUCOSE SERPL-MCNC: 87 MG/DL (ref 70–99)
GLUCOSE SERPL-MCNC: NORMAL MG/DL (ref 70–99)
HCO3 BLDV-SCNC: 33 MMOL/L (ref 21–28)
HCO3 BLDV-SCNC: 34 MMOL/L (ref 21–28)
HCT VFR BLD AUTO: 54.5 % (ref 40–53)
HGB BLD-MCNC: 17.7 G/DL (ref 13.3–17.7)
HLA TYPING COMPLETE SOT RECIPIENT: NORMAL
IMM GRANULOCYTES # BLD: 0 10E9/L (ref 0–0.4)
IMM GRANULOCYTES NFR BLD: 0.4 %
LA PPP-IMP: NEGATIVE
LYMPHOCYTES # BLD AUTO: 2.4 10E9/L (ref 0.8–5.3)
LYMPHOCYTES NFR BLD AUTO: 26.6 %
M TB IFN-G BLD-IMP: NEGATIVE
M TB IFN-G CD4+ BCKGRND COR BLD-ACNC: >10 IU/ML
MAGNESIUM SERPL-MCNC: 2.2 MG/DL (ref 1.6–2.3)
MAGNESIUM SERPL-MCNC: 2.4 MG/DL (ref 1.6–2.3)
MAGNESIUM SERPL-MCNC: NORMAL MG/DL (ref 1.6–2.3)
MCH RBC QN AUTO: 31.4 PG (ref 26.5–33)
MCHC RBC AUTO-ENTMCNC: 32.5 G/DL (ref 31.5–36.5)
MCV RBC AUTO: 97 FL (ref 78–100)
MITOGEN IGNF BCKGRD COR BLD-ACNC: 0 IU/ML
MITOGEN IGNF BCKGRD COR BLD-ACNC: 0 IU/ML
MONOCYTES # BLD AUTO: 1.3 10E9/L (ref 0–1.3)
MONOCYTES NFR BLD AUTO: 13.9 %
NEUTROPHILS # BLD AUTO: 5 10E9/L (ref 1.6–8.3)
NEUTROPHILS NFR BLD AUTO: 56.1 %
NRBC # BLD AUTO: 0 10*3/UL
NRBC BLD AUTO-RTO: 0 /100
NT-PROBNP SERPL-MCNC: 1588 PG/ML (ref 0–900)
O2/TOTAL GAS SETTING VFR VENT: 21 %
O2/TOTAL GAS SETTING VFR VENT: 21 %
OXYHGB MFR BLDV: 25 %
OXYHGB MFR BLDV: 50 %
PCO2 BLDV: 50 MM HG (ref 40–50)
PCO2 BLDV: 56 MM HG (ref 40–50)
PH BLDV: 7.39 PH (ref 7.32–7.43)
PH BLDV: 7.43 PH (ref 7.32–7.43)
PLATELET # BLD AUTO: 202 10E9/L (ref 150–450)
PO2 BLDV: 20 MM HG (ref 25–47)
PO2 BLDV: 29 MM HG (ref 25–47)
POTASSIUM SERPL-SCNC: 3.7 MMOL/L (ref 3.4–5.3)
POTASSIUM SERPL-SCNC: 3.8 MMOL/L (ref 3.4–5.3)
POTASSIUM SERPL-SCNC: ABNORMAL MMOL/L (ref 3.4–5.3)
POTASSIUM SERPL-SCNC: NORMAL MMOL/L (ref 3.4–5.3)
PRA SINGLE ANTIGEN IGG ANTIBODY: NORMAL
RBC # BLD AUTO: 5.64 10E12/L (ref 4.4–5.9)
SODIUM SERPL-SCNC: 131 MMOL/L (ref 133–144)
SODIUM SERPL-SCNC: 133 MMOL/L (ref 133–144)
SODIUM SERPL-SCNC: NORMAL MMOL/L (ref 133–144)
WBC # BLD AUTO: 9 10E9/L (ref 4–11)

## 2018-10-25 PROCEDURE — 25000125 ZZHC RX 250: Performed by: STUDENT IN AN ORGANIZED HEALTH CARE EDUCATION/TRAINING PROGRAM

## 2018-10-25 PROCEDURE — 83735 ASSAY OF MAGNESIUM: CPT | Performed by: INTERNAL MEDICINE

## 2018-10-25 PROCEDURE — 84132 ASSAY OF SERUM POTASSIUM: CPT | Performed by: INTERNAL MEDICINE

## 2018-10-25 PROCEDURE — 80048 BASIC METABOLIC PNL TOTAL CA: CPT | Performed by: INTERNAL MEDICINE

## 2018-10-25 PROCEDURE — 83880 ASSAY OF NATRIURETIC PEPTIDE: CPT | Performed by: STUDENT IN AN ORGANIZED HEALTH CARE EDUCATION/TRAINING PROGRAM

## 2018-10-25 PROCEDURE — 82310 ASSAY OF CALCIUM: CPT

## 2018-10-25 PROCEDURE — 85025 COMPLETE CBC W/AUTO DIFF WBC: CPT | Performed by: STUDENT IN AN ORGANIZED HEALTH CARE EDUCATION/TRAINING PROGRAM

## 2018-10-25 PROCEDURE — 80048 BASIC METABOLIC PNL TOTAL CA: CPT | Performed by: STUDENT IN AN ORGANIZED HEALTH CARE EDUCATION/TRAINING PROGRAM

## 2018-10-25 PROCEDURE — 82374 ASSAY BLOOD CARBON DIOXIDE: CPT

## 2018-10-25 PROCEDURE — 83735 ASSAY OF MAGNESIUM: CPT | Performed by: STUDENT IN AN ORGANIZED HEALTH CARE EDUCATION/TRAINING PROGRAM

## 2018-10-25 PROCEDURE — 00000146 ZZHCL STATISTIC GLUCOSE BY METER IP

## 2018-10-25 PROCEDURE — 84295 ASSAY OF SERUM SODIUM: CPT

## 2018-10-25 PROCEDURE — 25000132 ZZH RX MED GY IP 250 OP 250 PS 637: Performed by: STUDENT IN AN ORGANIZED HEALTH CARE EDUCATION/TRAINING PROGRAM

## 2018-10-25 PROCEDURE — 21400006 ZZH R&B CCU INTERMEDIATE UMMC

## 2018-10-25 PROCEDURE — 84295 ASSAY OF SERUM SODIUM: CPT | Performed by: INTERNAL MEDICINE

## 2018-10-25 PROCEDURE — 36592 COLLECT BLOOD FROM PICC: CPT | Performed by: STUDENT IN AN ORGANIZED HEALTH CARE EDUCATION/TRAINING PROGRAM

## 2018-10-25 PROCEDURE — 82947 ASSAY GLUCOSE BLOOD QUANT: CPT

## 2018-10-25 PROCEDURE — 84520 ASSAY OF UREA NITROGEN: CPT

## 2018-10-25 PROCEDURE — 36415 COLL VENOUS BLD VENIPUNCTURE: CPT | Performed by: INTERNAL MEDICINE

## 2018-10-25 PROCEDURE — 82435 ASSAY OF BLOOD CHLORIDE: CPT

## 2018-10-25 PROCEDURE — 82565 ASSAY OF CREATININE: CPT

## 2018-10-25 PROCEDURE — 99233 SBSQ HOSP IP/OBS HIGH 50: CPT | Mod: GC | Performed by: INTERNAL MEDICINE

## 2018-10-25 PROCEDURE — 82805 BLOOD GASES W/O2 SATURATION: CPT | Performed by: INTERNAL MEDICINE

## 2018-10-25 RX ADMIN — POTASSIUM CHLORIDE 20 MEQ: 750 TABLET, EXTENDED RELEASE ORAL at 10:20

## 2018-10-25 RX ADMIN — ATORVASTATIN CALCIUM 10 MG: 10 TABLET, FILM COATED ORAL at 19:44

## 2018-10-25 RX ADMIN — TRAZODONE HYDROCHLORIDE 50 MG: 50 TABLET ORAL at 00:19

## 2018-10-25 RX ADMIN — RANITIDINE 150 MG: 150 TABLET, FILM COATED ORAL at 19:44

## 2018-10-25 RX ADMIN — DIGOXIN 250 MCG: 125 TABLET ORAL at 07:59

## 2018-10-25 RX ADMIN — FUROSEMIDE 20 MG/HR: 10 INJECTION, SOLUTION INTRAVENOUS at 13:03

## 2018-10-25 RX ADMIN — BUPROPION HYDROCHLORIDE 150 MG: 150 TABLET, FILM COATED, EXTENDED RELEASE ORAL at 07:59

## 2018-10-25 RX ADMIN — ASPIRIN 81 MG: 81 TABLET, COATED ORAL at 07:59

## 2018-10-25 RX ADMIN — POTASSIUM CHLORIDE 20 MEQ: 750 TABLET, EXTENDED RELEASE ORAL at 21:10

## 2018-10-25 RX ADMIN — Medication 100 MG: at 07:59

## 2018-10-25 RX ADMIN — RANITIDINE 150 MG: 150 TABLET, FILM COATED ORAL at 07:59

## 2018-10-25 ASSESSMENT — ACTIVITIES OF DAILY LIVING (ADL)
ADLS_ACUITY_SCORE: 10

## 2018-10-25 NOTE — PLAN OF CARE
Problem: Cardiac: Heart Failure (Adult)  Goal: Signs and Symptoms of Listed Potential Problems Will be Absent, Minimized or Managed (Cardiac: Heart Failure)  Signs and symptoms of listed potential problems will be absent, minimized or managed by discharge/transition of care (reference Cardiac: Heart Failure (Adult) CPG).   Outcome: No Change  Pt VSS; SR in the 70s with occasional PVCs; RA with sats in the upper 90s. Denies pain. Lasix gtt increased to 20mg/hr with a 120mg bolus; good UO. Up independently. 1g Na diet; 2L FR. Potassium replaced per protocol. Continues with LVAD w/u. Plan to transfer to  for swan when bed available. Continue to monitor and notify team with changes.

## 2018-10-25 NOTE — PLAN OF CARE
Problem: Patient Care Overview  Goal: Plan of Care/Patient Progress Review  6C/OT: Cancel. Pt declining therapy on approach this AM, with alternate provider on re-approach x2 this PM. Will reschedule for tomorrow.

## 2018-10-25 NOTE — PROGRESS NOTES
No significant change in status today. Continues to await bed availability in ICU for [facility]rina. Continues on lasix gtt @ 20mg/hr w/fair urine outpt. Weight down today and pt in good spirits.   K 3.7-given 20mEq KCl po. Denies pain. Independent with shower; ambulating around unit. Had LVAD show-and-tell visit today. Continued meeting with neuro psych as part of  LVAD work up.

## 2018-10-25 NOTE — PROGRESS NOTES
"Met with patient, sister (Fani) to present the HM3 VAD(s) as possible treatment option.     Discussed patient and caregiver responsibilities before and after VAD implant.  Clarified the need for a caregiver to be present for education and to assist patient for at least first 30 days after a patient returns home. Patient's designated caregiver is Fani.     Discussed basic overview of VAD equipment, on going management, need for anticoagulation, regular dressing change, grounded three-pronged outlet and functioning telephone. Also discussed frequency of follow-up clinic appointments and the need to stay locally for at least 2-4 weeks.  Reviewed restrictions of having a VAD such as no swimming, bathing, boats, MRI's, or arc welding.     Provided and discussed the VAD educational brochures, information regarding the VAD and transplant support groups, information on INTERMACs registry, and \"VAD What You Should Know\" with additional details. Patient and Fani signed \"VAD What You Should Know\" document. VAD coordinator contact information provided.  Encouraged patient,  to call with questions.    strength: 81.7, 72.8, 66.3 Average: 73.6  "

## 2018-10-25 NOTE — PROGRESS NOTES
LVAD Social Work Services Progress Note      Date of Initial Social Work Evaluation: 10/23/18  Collaborated with: Sister    Data: Fabiano continues to undergo workup for LVAD. Was doing neuropsych today. Sister was present.  Intervention: Stopped in to meet with patient/sister. Only met with Sister. Introduced this writer and explained role of SW on VAD team. Inquired into questions/concerns. Discussed need for caregiver and duties required. Also discussed lodging options  Assessment: Sister verbally reports she will be caregiver and that there are no financial or psychosocial issues with this plan.  Education provided by SW: Role of SW  Plan:    Discharge Plans in Progress: TBD    Barriers to d/c plan: Undergoing LVAD workup    Follow up Plan:  SW will continue to remain available to consult on VAD workup

## 2018-10-25 NOTE — PLAN OF CARE
Problem: Patient Care Overview  Goal: Plan of Care/Patient Progress Review  Outcome: No Change  [id number]: Patient admitted for LVAD work-up for worsening heart failure. A&Ox4. VSS on RA. Up ad moshe, pleasant and cooperative with cares. PRN Trazadone at HS, patient appeared to sleep in-between cares. Voiding unimpressive amounts but adequate UO, Lasix gtt 20mg/hr (2mL/hr) through PIV. LBM 10/22/18. Tolerating 1gm Na diet and 2L FR. Denies pain.  Plan: possibly transfer to  today for [place], bed unavailable yesterday. Will continue to monitor and follow POC.

## 2018-10-26 ENCOUNTER — APPOINTMENT (OUTPATIENT)
Dept: CARDIOLOGY | Facility: CLINIC | Age: 63
DRG: 287 | End: 2018-10-26
Attending: INTERNAL MEDICINE
Payer: COMMERCIAL

## 2018-10-26 ENCOUNTER — APPOINTMENT (OUTPATIENT)
Dept: OCCUPATIONAL THERAPY | Facility: CLINIC | Age: 63
DRG: 287 | End: 2018-10-26
Attending: INTERNAL MEDICINE
Payer: COMMERCIAL

## 2018-10-26 LAB
AMPHETAMINES UR QL SCN: NEGATIVE
ANION GAP SERPL CALCULATED.3IONS-SCNC: 14 MMOL/L (ref 3–14)
ANION GAP SERPL CALCULATED.3IONS-SCNC: 9 MMOL/L (ref 3–14)
BARBITURATES UR QL: NEGATIVE
BASE EXCESS BLDV CALC-SCNC: 4.6 MMOL/L
BASOPHILS # BLD AUTO: 0.1 10E9/L (ref 0–0.2)
BASOPHILS NFR BLD AUTO: 0.6 %
BENZODIAZ UR QL: NEGATIVE
BUN SERPL-MCNC: 28 MG/DL (ref 7–30)
BUN SERPL-MCNC: 29 MG/DL (ref 7–30)
CALCIUM SERPL-MCNC: 8.9 MG/DL (ref 8.5–10.1)
CALCIUM SERPL-MCNC: 9.2 MG/DL (ref 8.5–10.1)
CANNABINOIDS UR QL SCN: POSITIVE
CHLORIDE SERPL-SCNC: 100 MMOL/L (ref 94–109)
CHLORIDE SERPL-SCNC: 98 MMOL/L (ref 94–109)
CO2 SERPL-SCNC: 22 MMOL/L (ref 20–32)
CO2 SERPL-SCNC: 27 MMOL/L (ref 20–32)
COCAINE UR QL: NEGATIVE
CREAT SERPL-MCNC: 1.08 MG/DL (ref 0.66–1.25)
CREAT SERPL-MCNC: 1.08 MG/DL (ref 0.66–1.25)
DIFFERENTIAL METHOD BLD: ABNORMAL
EOSINOPHIL # BLD AUTO: 0.2 10E9/L (ref 0–0.7)
EOSINOPHIL NFR BLD AUTO: 2.1 %
ERYTHROCYTE [DISTWIDTH] IN BLOOD BY AUTOMATED COUNT: 15.8 % (ref 10–15)
ETHANOL UR QL SCN: NEGATIVE
GFR SERPL CREATININE-BSD FRML MDRD: 69 ML/MIN/1.7M2
GFR SERPL CREATININE-BSD FRML MDRD: 69 ML/MIN/1.7M2
GLUCOSE SERPL-MCNC: 102 MG/DL (ref 70–99)
GLUCOSE SERPL-MCNC: 136 MG/DL (ref 70–99)
HCO3 BLDV-SCNC: 29 MMOL/L (ref 21–28)
HCT VFR BLD AUTO: 55.5 % (ref 40–53)
HGB BLD-MCNC: 17.7 G/DL (ref 13.3–17.7)
IMM GRANULOCYTES # BLD: 0 10E9/L (ref 0–0.4)
IMM GRANULOCYTES NFR BLD: 0.4 %
LYMPHOCYTES # BLD AUTO: 1.9 10E9/L (ref 0.8–5.3)
LYMPHOCYTES NFR BLD AUTO: 23.4 %
MAGNESIUM SERPL-MCNC: 2.3 MG/DL (ref 1.6–2.3)
MAGNESIUM SERPL-MCNC: 2.4 MG/DL (ref 1.6–2.3)
MCH RBC QN AUTO: 30.9 PG (ref 26.5–33)
MCHC RBC AUTO-ENTMCNC: 31.9 G/DL (ref 31.5–36.5)
MCV RBC AUTO: 97 FL (ref 78–100)
MONOCYTES # BLD AUTO: 1.3 10E9/L (ref 0–1.3)
MONOCYTES NFR BLD AUTO: 15.3 %
NEUTROPHILS # BLD AUTO: 4.8 10E9/L (ref 1.6–8.3)
NEUTROPHILS NFR BLD AUTO: 58.2 %
NRBC # BLD AUTO: 0 10*3/UL
NRBC BLD AUTO-RTO: 0 /100
O2/TOTAL GAS SETTING VFR VENT: 21 %
OPIATES UR QL SCN: NEGATIVE
OXYHGB MFR BLDV: 85 %
PCO2 BLDV: 42 MM HG (ref 40–50)
PCP UR QL SCN: NEGATIVE
PH BLDV: 7.45 PH (ref 7.32–7.43)
PLATELET # BLD AUTO: 217 10E9/L (ref 150–450)
PO2 BLDV: 51 MM HG (ref 25–47)
POTASSIUM SERPL-SCNC: 3.6 MMOL/L (ref 3.4–5.3)
POTASSIUM SERPL-SCNC: 3.6 MMOL/L (ref 3.4–5.3)
PROTOCOL CUTOFF: NORMAL
RBC # BLD AUTO: 5.73 10E12/L (ref 4.4–5.9)
SA1 CELL: NORMAL
SA1 COMMENTS: NORMAL
SA1 HI RISK ABY: NORMAL
SA1 MOD RISK ABY: NORMAL
SA1 TEST METHOD: NORMAL
SA2 CELL: NORMAL
SA2 COMMENTS: NORMAL
SA2 HI RISK ABY UA: NORMAL
SA2 MOD RISK ABY: NORMAL
SA2 TEST METHOD: NORMAL
SODIUM SERPL-SCNC: 133 MMOL/L (ref 133–144)
SODIUM SERPL-SCNC: 135 MMOL/L (ref 133–144)
UNACCEPTABLE ANTIGEN: NORMAL
UNOS CPRA: 0
WBC # BLD AUTO: 8.2 10E9/L (ref 4–11)

## 2018-10-26 PROCEDURE — 25000132 ZZH RX MED GY IP 250 OP 250 PS 637: Performed by: STUDENT IN AN ORGANIZED HEALTH CARE EDUCATION/TRAINING PROGRAM

## 2018-10-26 PROCEDURE — 80048 BASIC METABOLIC PNL TOTAL CA: CPT | Performed by: STUDENT IN AN ORGANIZED HEALTH CARE EDUCATION/TRAINING PROGRAM

## 2018-10-26 PROCEDURE — 85025 COMPLETE CBC W/AUTO DIFF WBC: CPT | Performed by: STUDENT IN AN ORGANIZED HEALTH CARE EDUCATION/TRAINING PROGRAM

## 2018-10-26 PROCEDURE — 82805 BLOOD GASES W/O2 SATURATION: CPT | Performed by: INTERNAL MEDICINE

## 2018-10-26 PROCEDURE — 21400006 ZZH R&B CCU INTERMEDIATE UMMC

## 2018-10-26 PROCEDURE — 83735 ASSAY OF MAGNESIUM: CPT | Performed by: STUDENT IN AN ORGANIZED HEALTH CARE EDUCATION/TRAINING PROGRAM

## 2018-10-26 PROCEDURE — 40000802 ZZH SITE CHECK

## 2018-10-26 PROCEDURE — 27211181 ZZH BALLOON TIP PRESSURE CR5

## 2018-10-26 PROCEDURE — 80307 DRUG TEST PRSMV CHEM ANLYZR: CPT | Performed by: STUDENT IN AN ORGANIZED HEALTH CARE EDUCATION/TRAINING PROGRAM

## 2018-10-26 PROCEDURE — 99223 1ST HOSP IP/OBS HIGH 75: CPT | Performed by: INTERNAL MEDICINE

## 2018-10-26 PROCEDURE — 4A023N6 MEASUREMENT OF CARDIAC SAMPLING AND PRESSURE, RIGHT HEART, PERCUTANEOUS APPROACH: ICD-10-PCS | Performed by: INTERNAL MEDICINE

## 2018-10-26 PROCEDURE — 97110 THERAPEUTIC EXERCISES: CPT | Mod: GO

## 2018-10-26 PROCEDURE — 93451 RIGHT HEART CATH: CPT

## 2018-10-26 PROCEDURE — 40000133 ZZH STATISTIC OT WARD VISIT

## 2018-10-26 PROCEDURE — 93451 RIGHT HEART CATH: CPT | Mod: 26 | Performed by: INTERNAL MEDICINE

## 2018-10-26 PROCEDURE — 36415 COLL VENOUS BLD VENIPUNCTURE: CPT | Performed by: STUDENT IN AN ORGANIZED HEALTH CARE EDUCATION/TRAINING PROGRAM

## 2018-10-26 PROCEDURE — 99232 SBSQ HOSP IP/OBS MODERATE 35: CPT | Mod: 25 | Performed by: INTERNAL MEDICINE

## 2018-10-26 PROCEDURE — 80320 DRUG SCREEN QUANTALCOHOLS: CPT | Performed by: STUDENT IN AN ORGANIZED HEALTH CARE EDUCATION/TRAINING PROGRAM

## 2018-10-26 PROCEDURE — 25000128 H RX IP 250 OP 636: Performed by: STUDENT IN AN ORGANIZED HEALTH CARE EDUCATION/TRAINING PROGRAM

## 2018-10-26 PROCEDURE — C1894 INTRO/SHEATH, NON-LASER: HCPCS

## 2018-10-26 PROCEDURE — 27210982 ZZH KIT RT HC TOTES DISP CR7

## 2018-10-26 PROCEDURE — 27210787 ZZH MANIFOLD CR2

## 2018-10-26 RX ORDER — MILRINONE LACTATE 0.2 MG/ML
0.12 INJECTION, SOLUTION INTRAVENOUS CONTINUOUS
Status: CANCELLED | OUTPATIENT
Start: 2018-10-26

## 2018-10-26 RX ORDER — FUROSEMIDE 10 MG/ML
60 INJECTION INTRAMUSCULAR; INTRAVENOUS 2 TIMES DAILY
Status: DISCONTINUED | OUTPATIENT
Start: 2018-10-26 | End: 2018-10-27

## 2018-10-26 RX ORDER — POTASSIUM CHLORIDE 750 MG/1
20 TABLET, EXTENDED RELEASE ORAL ONCE
Status: COMPLETED | OUTPATIENT
Start: 2018-10-26 | End: 2018-10-26

## 2018-10-26 RX ADMIN — Medication 100 MG: at 08:25

## 2018-10-26 RX ADMIN — ASPIRIN 81 MG: 81 TABLET, COATED ORAL at 08:25

## 2018-10-26 RX ADMIN — BUPROPION HYDROCHLORIDE 150 MG: 150 TABLET, FILM COATED, EXTENDED RELEASE ORAL at 08:25

## 2018-10-26 RX ADMIN — POTASSIUM CHLORIDE 20 MEQ: 750 TABLET, EXTENDED RELEASE ORAL at 14:01

## 2018-10-26 RX ADMIN — RANITIDINE 150 MG: 150 TABLET, FILM COATED ORAL at 19:16

## 2018-10-26 RX ADMIN — RANITIDINE 150 MG: 150 TABLET, FILM COATED ORAL at 08:25

## 2018-10-26 RX ADMIN — DIGOXIN 250 MCG: 125 TABLET ORAL at 08:25

## 2018-10-26 RX ADMIN — FUROSEMIDE 60 MG: 10 INJECTION, SOLUTION INTRAVENOUS at 16:00

## 2018-10-26 RX ADMIN — POTASSIUM CHLORIDE 20 MEQ: 750 TABLET, EXTENDED RELEASE ORAL at 23:53

## 2018-10-26 RX ADMIN — TRAZODONE HYDROCHLORIDE 50 MG: 50 TABLET ORAL at 22:13

## 2018-10-26 RX ADMIN — ATORVASTATIN CALCIUM 10 MG: 10 TABLET, FILM COATED ORAL at 19:16

## 2018-10-26 RX ADMIN — POTASSIUM CHLORIDE 20 MEQ: 750 TABLET, EXTENDED RELEASE ORAL at 08:25

## 2018-10-26 ASSESSMENT — ACTIVITIES OF DAILY LIVING (ADL)
ADLS_ACUITY_SCORE: 10

## 2018-10-26 NOTE — PROGRESS NOTES
Cardiology Progress Note  Danielito Chu MRN: [id number]  Age: 63 year old, : 1955  10/26/18              Changes Today:     - Weight downtrending   - RHC indicative of RA - 7, RV - 43/9, PCWP - 21, PAsat 59.2%, CI - 1.6, PVR - 3.4, SVR - 1950   - STOP furosemide gtt   - Start intermittent Lasix at 60mg IV BID - may uptitrate  - PICC in place already if we decide to pursue inotrope therapy   - Appreciate RN coordinator/social work support. Per insurance BOTH dobutamine and Milrinone are approved from insurance standpoint.   - Considering home inotrope versus increased afterload reduction - p ending discussion with cardiology team and OP cardiologist.          Assessment and Plan:     Danielito Chu is a 63 year old with PMH of HFrEF 2/2 NICM (EF 15%) , s/p ICD, HTN , DM II, SHIRLEY who is presenting for decompensated HF and expedited VAD/Transplant work up.      #Decompensated HFrEF 2/2 NICM (EF 15%)   #S/P ICD   Likely related to non-medical adherence in the past couple of months versus progressive decline of cardiac function . Patient initially diagnosed in  - he did well for 7-8 months however started decompensating over the past couple of months.   RHC in 2018 showed elevated filling pressures and cardiopulmonary testing showed MVo2 9.3 (pred 30.4) suggestive of poor prognosis.      RHC as above indicative of appropriate Right sided filling pressures and elevated left sided pressures.     - Diuresis: Lasix 120 mg --> lasix 5 mg/hr gtt (good output) --> 10/22 increase to 10mg and bolus 120mg --->10/24 Increase gtt to 15mg/h and Rebolus with 80mg---> 10/24 PM increase to 20mg/h---> 10/25 based on RHC values STOP furosemide gtt and start Furosemide 60mg IV BID   - BB: Hold PTA Metoprolol 25 mg-XL   - ACE/ARB: Hold PTA Entresto BID (hypotension)   - Digoxin 250 mcg qday (renal function appropriate)  - Atorvastatin 10 mg Qday  - Aspirin 81 mg qday       # Depression  / Adjustment disorder   # Social Support   # LVAD work up   Appears to not have decided completely regarding need for VAD and hoping that this isn't the time. Feels as though this may not be the right time as the patient doesn't feel sick enough and doesn't feel the immediate impetus to obtain a VAD at this point however this may be the most opportune time as he does present with appropriate right sided pressures.   - Start wellbutrin 150 mg qday   - Mental health consult   - Palliative also follow as part of VAD eval - appreciate recommendations and note.      # Symptom Control  Consider wedge pillow for sleep.     #NSVT   #s/p ICD   Noted on most recent interrogation of ICD  - K>4, Mg>2       #DM II : sliding scale insulin       #SHIRLEY: CPAP       #HTN: PTA entersto and BB. Holding both for now       Prophylaxis:  DVT: Ambulate   GI: None  Disposition: Pending further diuresis and work up.   Code Status: Full      Patient was discussed with staff attending      Melissa Rehman  PGY-2 Internal Medicine  Cardiology Service          Subjective     Patient feeling OK tomorrow.           Objective     Vitals:  Temp:  [97.2  F (36.2  C)-98  F (36.7  C)] 97.4  F (36.3  C)  Heart Rate:  [74-96] 96  Resp:  [16-18] 16  BP: ()/(59-80) 112/77  SpO2:  [94 %-96 %] 94 %    Vitals:    10/25/18 0600 10/26/18 0001 10/26/18 1100   Weight: 86.1 kg (189 lb 14.4 oz) 86.6 kg (190 lb 14.4 oz) 85.5 kg (188 lb 6.4 oz)     Gen: AA&Ox3   HEENT: moist oral mucosa   PULM/THORAX: Clear to auscultation bilaterally, no rales/stridor/wheezes  CV:RRR, S1 and S2 appreciated, no extra heart sounds, murmurs or rub auscultated. No JVD  ABD: soft, nt and nd    EXT: warm no significant edema             Data:      Labs reviewed. Pertinent for : K - 3.6, Cr - 1.08  Mg - 2.4           Medications     Medications    aspirin  81 mg Oral Daily     atorvastatin  10 mg Oral Daily     buPROPion  150 mg Oral Daily     digoxin  250 mcg Oral Daily      ranitidine  150 mg Oral BID     sodium chloride (PF)  10 mL Intracatheter Q7 Days     vitamin  B-1  100 mg Oral Daily       - MEDICATION INSTRUCTIONS -       furosemide 10 mg/hr (10/26/18 0946)

## 2018-10-26 NOTE — PLAN OF CARE
Problem: Patient Care Overview  Goal: Plan of Care/Patient Progress Review  6C/OT: Pt declining therapy this AM, stating he is waiting for his [place] placement and didn't want to be out of room. Will check back later as able.

## 2018-10-26 NOTE — PLAN OF CARE
Problem: Cardiac: Heart Failure (Adult)  Goal: Signs and Symptoms of Listed Potential Problems Will be Absent, Minimized or Managed (Cardiac: Heart Failure)  Signs and symptoms of listed potential problems will be absent, minimized or managed by discharge/transition of care (reference Cardiac: Heart Failure (Adult) CPG).   Outcome: No Change  Patient admitted for heart failure and fluid volume overload. Patient is currently having an LVAD workup. Here waiting for transfer to ICU for a swan procedure. Patient is on a 1g Na restriction, 2L fluid restriction. 20 mEq k given around 9:15 per protocol. Patient on furosemide 20mg/hour voiding well. Up independently. Denies pain.

## 2018-10-26 NOTE — PROCEDURES
CARDIAC CATH REPORT:   PROCEDURES PERFORMED:   Right Heart Catheterization    PHYSICIANS:  Attending Physician: Kleber James MD  Interventional Cardiology Fellow: None  Cardiology Fellow: Kleber Hogan MD    INDICATION:  Danielito Chu is a 63 year old male with non-ischemic cardiomyopathy who presents for right heart catheterization to assess volume status and cardiac output.    DESCRIPTION:  1. Consent obtained with discussion of risks.  All questions were answered.  2. Sterile prep and procedure.  3. Location with Sheaths:   Rt IJ  7 Fr 10 cm [short]  4. Access: Local anesthetic with lidocaine.  A standard 18 guage needle with ultrasound guidance was used to establish vascular access using a modified Seldinger technique.  5. Diagnostic Catheters:   7 Fr  [place] Soy  6. Guiding Catheters:  None  6. Estimated blood loss: < 5 ml    MEDICATIONS:  Heart rate, BP, respiration, oxygen saturation and patient responses were monitored throughout the procedure with the assistance of the RN under my supervision.    Procedures:    HEMODYNAMICS:  BSA 2 m2  1. HR 90 bpm  2. /64/85 mmHg  3. RA 9/8/7   4. RV 43/9  5. PA 43/23/35   6. PCW 23/32/21   7. PA sat 59.2%   8. PCW sat 91.8%  9. Hgb 17.5 g/dL   10. Timmy CO 3.2   11. Timmy CI 1.6   12. TD CO 4.1   13. TD CI 2.0   14. PVR 3.4   15. SVR 1950     Sheath Removal:  The RIJ sheath was manually removed in the cardiac catheterization laboratory.    Contrast: Isovue, 0 ml     Fluoroscopy Time: 4.6 min    COMPLICATIONS:  1. None    SUMMARY:   >> Normal right sided filling pressures.  >> High left sided filling pressures.  >> Mild pulmonary artery hypertension, post-capillary  >> Reduced cardiac output, 3.2 L/min with index 1.6 L/min/m2    PLAN:   >> Continued medical management and lifestyle modification for cardiovascular risk factor optimization.   >>  Return to inpatient team for further management of heart failure.    The attending interventional cardiologist was  present and supervised all critical aspects the procedure.    Findings discussed with patient and Cards 2 team.    See CVIS report for final draft.    Kleber Hogan MD   Cardiology Fellow    Kleber James MD  Cardiology Staff      ATTENDING ATTESTATION: I was present and supervised the cardiology fellow throughout the procedure and reviewed the findings with the fellow at the completion of the procedure.  I agree with the documentation above.    Kleber James MD, PhD  Interventional/Critical Care Cardiology  [phone]    October 26, 2018

## 2018-10-26 NOTE — CONSULTS
"[facility], [place]    Palliative Care Consultation Note    Patient: Danielito Chu  Date of Admission:  10/19/2018    Requesting provider/team: Cardiology  Reason for consult: LVAD preparedness planning     Recommendations:  - Mr. Chu is currently unsure about the LVAD - planning for RHC for further information today.  He prefers full information and to discuss with this family to make a decision.   - Patient interested in attending LVAD support group as he is unsure of his willingness/ability to take on the lifestyle changes that would come with the device - I think would be helpful.   - At this point, he says he \"doesn't feel sick enough\" to consider such an invasive change, but understands that the LVAD could significantly lengthen his life.  After information from the cath, discussion of recommended timing and the impact it would have on his prognosis would be helpful to him.     Thank you for the opportunity to participate in the care of this patient and family.   We will sign off at this time. Please feel free to reconsult if further questions or support needed.       Monet Carmona  Pager: [phone]  [facility] Inpatient Team Consult pager [phone] (M-F 8-4:30)  After-hours Answering Service [phone]   Palliative Clinic: [phone]     Assessment:  Danielito Chu is a 63 year old male with HFrEF (EF 18%) due to NICM, ICD in place, who presents for heart failure exacerbation and is being evaluated for a destination LVAD.  He is not a transplant candidate due to recent smoking and marijuana use.  Sister would plan to move up to care for him after surgery.   He is currently undecided about the LVAD because he feels his quality of life is reasonable right now, but admits were he to feel worse would consider.  He also has concerns about the lifestyle change, as he admits he has had difficulty complying with diet and exercise recommendations.     Met  with patient and " "sister at the bedside.  Introduced the Palliative Care team and services we provide; such as symptom management, assistance navigating chronic illness and goals for treatment, counseling, psychosocial and spiritual support, as well as role in work-up for LVAD process.     LVAD preparedness plan    Patient s legally designated health care agent: Has not legally established, but said would be sister Fani with girlfriend Yanna as 2nd    Patient s description of how they make decisions (by themselves, in consultation with certain close loved ones, based on advice from medical professionals, etc):  Discuss with family, but ultimately makes decision themselves based on all current information.  Likes to have all information available.     Patient s personal goals/hopes for receiving the LVAD: Hopes it will help him live longer.  He would like to feel well enough to be able to visit [place] and stay there for the winter.     Patient s worries/concerns when considering receiving the LVAD: He's concerned about the change to his life with the LVAD.  Says he's had trouble sticking with diet, and feels will be more restricted with LVAD.  Also concerned about chance for complications requiring prolonged hospitalizations.     How does the patient envision or anticipate his/her future with the LVAD? He's talked to several patients with LVADs which he found helpful.  He thinks it would make him feel better, but also says he feels \"OK\" right now and doesn't see how it would make him feel better than currently.    Patient s thoughts about what constitutes a  good  quality of life: He values being independent and able to think for himself.      Patient s thoughts about what health conditions would be unacceptable (situations the patient would want her/his doctors and loved ones to know that she/he would not want life prolonged)?   He values being able to think for himself and interact with his family.  If he were not able to do these " "things, he thinks he would not want life prolonged.      LVADs carry a risk of stroke which can be impairing. After a stroke, what sort of functional outcomes would be acceptable vs unacceptable to the patient? Says he thinks if he were more functionally impaired or bedbound, he would be alright with that, but would not find it acceptable if he couldn't think for himself or interact with his family.       Chronic mechanical ventilation via a tracheostomy tube is a risk. What are the circumstances the patient would want her/his physicians and family to keep them alive with long-term mechanical ventilation vs allow them to die?   His mother previously had a tracheostomy and he doesn't think he would want that long-term.  He would accept short term ventilation if the situation were thought to be reversible, but does not think he would want long-term tracheostomy, particularly if he were not interactive.      Symptoms:    - Insomnia - mild, mostly related to orthopnea.  Difficulty with falling back asleep after awakening.  Also admits \"thoughts\" may be part of this.     - Would continue Wellbutrin as mood likely contributing.  If needed, would recommend trial of melatonin  Social:         Living situation: Lives independently.        Support system: Sister Fani, brother Brenden, girlfriend Yanna who lives in [place]       Actual/Potential Caregiver: Fani       Functional status: Independent in ADLs and IADLs, drives       Financial concerns: None       Substance use disorder (past / present): Tobacco,        Occupation: Worked as project supervisor, >70hrs week       Hobbies: Says he's not a \"hobby\" cele.  Likes relaxing at home.  Previously enjoyed traveling, hosting friends at [facility].    Coping: Admits has had depressive symptoms over past year due to changes in his lifestyle. Previously very active with demanding job and frequently travelled for work and pleasure. Has been on long term disability and more homebound since " "2018.  Feels adjusting to this has been difficult.  Credits his \"tenacity\" as what gives him strength.    Spiritual/Sikh:    Spiritual background: Denominational, not practicing.  Has difficulty with organized Cheondoism and a human's interpretation of a \"higher power's will\".  Does not use word God specifically, but does believe in a higher power.   Spiritual needs: None    History of Present Illness   Sources of History:patient and patient's sister  Met with patient and sister at bedside.  He says he feels well currently, a little fatigued.  Denies pain, SOB.  When has HF exacerbations, typically has more swelling in abdomen.  Food will taste differently and will have a poor appetite.  Also will have more difficulty breathing laying down, baseline is 2-pillow orthopnea.  Has been able to climb stairs and walk several blocks.      See discussion above.   ROS:  A comprehensive ROS has been negative other than stated in the HPI and below:   Palliative Symptom Review (0=no symptom/no concern, 1=mild, 2=moderate, 3=severe):  Pain: 0  Fatigue: 1  Nausea: 0  Constipation: 0  Diarrhea: 0  Depressive Symptoms: 1  Anxiety: 0  Drowsiness: 0  Poor Appetite: 0  Shortness of Breath: 0  Insomnia: 1-2, trouble staying asleep because will wake up SOB  Delirium: 0  Other: 0     Past Medical History:   Past Medical History:   Diagnosis Date     Acute systolic congestive heart failure (H) 2017     Diabetes (H)      HTN (hypertension)      Hyperlipidemia      Mitral regurgitation      Nocturnal oxygen desaturation 3/29/2018     Nonischemic cardiomyopathy (H) 2017     SHIRLEY (obstructive sleep apnea)      Pacemaker 2017    ICD 17          Past Surgical History:   Past Surgical History:   Procedure Laterality Date     IMPLANT IMPLANTABLE CARDIOVERTER DEFIBRILLATOR               Family History:   Family History   Problem Relation Age of Onset     Heart Failure Father       at age 86     Heart Failure Paternal Uncle  "      at 66      Myocardial Infarction Paternal Uncle       at age 62     Coronary Artery Disease Mother       during CABG at age 41     Family history reviewed       Allergies:   No Known Allergies         Medications:   I have reviewed this patient's medication profile and medications given in the past 24 hours.  Bupropion  mg (started 10/20)  Simethicone PRN (none in last 24 hours)           Physical Exam:   Vital Signs: Temp: 97.4  F (36.3  C) Temp src: Oral BP: 112/77   Heart Rate: 96 Resp: 16 SpO2: 94 % O2 Device: None (Room air)    Weight: 188 lbs 6.4 oz    Physical Exam:  Constitutional: Well-appearing, in no distress   HEENT: MMM, no oropharyngeal lesions.    Neck: Supple.   Cardiovascular: Normal rate, regular rhythm.  No murmurs/rubs/gallop.   Respiratory:  Normal respiratory effort and breath sounds.  No wheezes or rales.   GI:  Soft, normoactive bowel sounds; Non-tender, non-distended  Musculoskeletal: No lower extremity edema  Neuro: Conversational, normal bulk and tone, no tremor.  Motor and sensation grossly intact.  Psych: Normal insight, normal speech  Skin: Warm, no rash     Data reviewed:    Na 133, K 3.6, CO2 27, BUN 28, Cre 1.08  TProt 8, Alb 3.6, T Bili 2.3, ALT 14, AST 30, ALP 77  CBC 9, Hb 17.7, Plt 202    TTE 10/23/18 -  Severe LVD, severe diffuse hypokinesis.  EF 18%, global RV function mildly reduced.      Monet Carmona  Pager: [phone]  [facility] Inpatient Team Consult pager [phone] (M-F 8-4:30)  After-hours Answering Service [phone]  Palliative Clinic: [phone]     Total time spent was 80 minutes,  >50% of time was spent counseling and/or coordination of care regarding goals of care.

## 2018-10-26 NOTE — PLAN OF CARE
Problem: Patient Care Overview  Goal: Plan of Care/Patient Progress Review  Outcome: No Change  D: Heart failure. Fluid up.  I/A: VSS. Denies pain. Sinus rhythm. Lasix drip continues, rate reduce per MD order at midnight, now running at 10 mg/hr. Patient was concerned about having the rate turned down and had questions, MD paged and spoke with patient at bedside prior to drip being turned down. Adequate urine output.  P: Continue to monitor. Waiting for ICU bed availability for RHC/[facility].

## 2018-10-26 NOTE — PROGRESS NOTES
Cardiology Progress Note  Danielito Chu MRN: [id number]  Age: 63 year old, : 1955  10/25/18              Changes Today:     - Weight downtrending   - Continue lasix   - RHC when able   - Ongoing VAD w/u        Assessment and Plan:     Danielito Chu is a 63 year old with PMH of HFrEF 2/2 NICM (EF 15%) , s/p ICD, HTN , DM II, SHIRLEY who is presenting for decompensated HF and expedited VAD/Transplant work up.      #Decompensated HFrEF 2/2 NICM (EF 15%)   #S/P ICD   Likely related to non-medical adherence in the past couple of months. Patient initially diagnosed in  - he did well for 7-8 months however started decompensating over the past couple of months.   RHC in 2018 showed elevated filling pressures and cardiopulmonary testing showed MVo2 9.3 (pred 30.4) suggestive of poor prognosis.       Will optimize medically , repeat RHC after diuresis and start VAD work unit(s). Not canidate for transplant (recent marijuana + smoking).      - Diuresis: Lasix 120 mg --> lasix 5 mg/hr gtt (good output) --> 10/22 increase to 10mg and bolus 120mg --->10/24 Increase gtt to 15mg/h and Rebolus with 80mg---> 10/24 PM increase to 20mg/h  - BB: Hold PTA Metoprolol 25 mg-XL   - ACE/ARB: Hold PTA Entresto BID   - Digoxin 250 mcg qday (renal function appropriate)  - Atorvastatin 10 mg Qday  - Aspirin 81 mg qday       #Depression / Adjustment disorder   - Start wellbutrin 150 mg qday   - Mental health consult   - Palliative also follow as part of VAD eval.       #NSVT   #s/p ICD   Noted on most recent interrogation of ICD  - K>4, Mg>2       #DM II : sliding scale insulin       #SHIRLEY: CPAP       #HTN: PTA entersto and BB. Holding both for now       Prophylaxis:  DVT: Ambulate   GI: None  Disposition: Pending further diuresis and work up.   Code Status: Full      Patient was discussed with staff attending, Dr. Giancarlo Rehman  PGY-2 Internal Medicine  Cardiology Service                      Subjective     No acute events overnight. Patient happy his weight is finally down. No other issues throughout the night. Slept moderately well.           Objective     Vitals:  Temp:  [97  F (36.1  C)-98.4  F (36.9  C)] 98  F (36.7  C)  Heart Rate:  [74-91] 74  Resp:  [16-18] 18  BP: ()/(59-78) 98/59  SpO2:  [94 %-99 %] 95 %       Vitals:    10/23/18 0322 10/24/18 0010 10/25/18 0600   Weight: 88.2 kg (194 lb 6.4 oz) 88.3 kg (194 lb 9.6 oz) 86.1 kg (189 lb 14.4 oz)     Gen: AA&Ox3, no acute distress  HEENT:AT/ NC, PERRL b/l, EOM grossly intact, mucous membranes pink, moist without plaque or exudate  BACK: no CVA tenderness, no midline bony tenderness  PULM/THORAX: Clear to auscultation bilaterally, no rales/stridor/wheezes  CV:RRR, S1 and S2 appreciated, no extra heart sounds, murmurs or rub auscultated. JVD to 3cm above clavicle at 45 degrees   ABD: obese, soft, nontender, nondistended. Normoactive bowel sounds x 4, appreciated.  EXT: no significant edema             Data:      Labs reviewed. Pertinent for :  K - 3.7, Cr 0.86   NTbnp 1588           Medications     Medications    aspirin  81 mg Oral Daily     atorvastatin  10 mg Oral Daily     buPROPion  150 mg Oral Daily     digoxin  250 mcg Oral Daily     ranitidine  150 mg Oral BID     sodium chloride (PF)  10 mL Intracatheter Q7 Days     vitamin  B-1  100 mg Oral Daily       - MEDICATION INSTRUCTIONS -       furosemide 20 mg/hr (10/25/18 8183)

## 2018-10-26 NOTE — PROGRESS NOTES
Pt went for R heart cath at noon. Returned w/bandaid to [facility]-site C/D/I. Pressures improved but remain elevated. Plan to continue on lasix gtt @ 10mg/hr for now. K 3.6-given total of 40mEq KCl per MD request. Denies complaints; independent with cares in room.
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
## 2018-10-26 NOTE — PLAN OF CARE
Problem: Patient Care Overview  Goal: Plan of Care/Patient Progress Review  Discharge Planner OT   Patient plan for discharge: Home  Current status: Facilitated aerobic activity through ambulation and NuStep. Pt tolerated 15 continuous minutes. Pt completed 2 sets of 15 reps of UE strengthening exercises targeting biceps with 3 lb weights. VSS for duration of activity.   Barriers to return to prior living situation: medical course, deconditioning  Recommendations for discharge: Home with OP CR.   Rationale for recommendations: Pt is near functional baseline for ADLs. Pt would benefit from continued therapy to increase activity tolerance for ADLs.        Entered by: Corinne James 10/26/2018 4:10 PM

## 2018-10-27 LAB
ANION GAP SERPL CALCULATED.3IONS-SCNC: 10 MMOL/L (ref 3–14)
ANION GAP SERPL CALCULATED.3IONS-SCNC: 10 MMOL/L (ref 3–14)
BASE EXCESS BLDV CALC-SCNC: 4.5 MMOL/L
BASOPHILS # BLD AUTO: 0.1 10E9/L (ref 0–0.2)
BASOPHILS NFR BLD AUTO: 0.5 %
BUN SERPL-MCNC: 31 MG/DL (ref 7–30)
BUN SERPL-MCNC: 32 MG/DL (ref 7–30)
CALCIUM SERPL-MCNC: 8.8 MG/DL (ref 8.5–10.1)
CALCIUM SERPL-MCNC: 9.3 MG/DL (ref 8.5–10.1)
CHLORIDE SERPL-SCNC: 100 MMOL/L (ref 94–109)
CHLORIDE SERPL-SCNC: 97 MMOL/L (ref 94–109)
CO2 SERPL-SCNC: 22 MMOL/L (ref 20–32)
CO2 SERPL-SCNC: 25 MMOL/L (ref 20–32)
CREAT SERPL-MCNC: 1.09 MG/DL (ref 0.66–1.25)
CREAT SERPL-MCNC: 1.2 MG/DL (ref 0.66–1.25)
CRP SERPL-MCNC: 3.1 MG/L (ref 0–8)
DIFFERENTIAL METHOD BLD: ABNORMAL
EOSINOPHIL # BLD AUTO: 0.2 10E9/L (ref 0–0.7)
EOSINOPHIL NFR BLD AUTO: 1.7 %
ERYTHROCYTE [DISTWIDTH] IN BLOOD BY AUTOMATED COUNT: 15.9 % (ref 10–15)
FACT V ACT/NOR PPP: 55 % (ref 60–140)
GFR SERPL CREATININE-BSD FRML MDRD: 61 ML/MIN/1.7M2
GFR SERPL CREATININE-BSD FRML MDRD: 68 ML/MIN/1.7M2
GLUCOSE SERPL-MCNC: 100 MG/DL (ref 70–99)
GLUCOSE SERPL-MCNC: 175 MG/DL (ref 70–99)
HCO3 BLDV-SCNC: 29 MMOL/L (ref 21–28)
HCT VFR BLD AUTO: 55.3 % (ref 40–53)
HGB BLD-MCNC: 18 G/DL (ref 13.3–17.7)
IMM GRANULOCYTES # BLD: 0.1 10E9/L (ref 0–0.4)
IMM GRANULOCYTES NFR BLD: 0.5 %
LYMPHOCYTES # BLD AUTO: 2 10E9/L (ref 0.8–5.3)
LYMPHOCYTES NFR BLD AUTO: 21.3 %
MAGNESIUM SERPL-MCNC: 2.2 MG/DL (ref 1.6–2.3)
MAGNESIUM SERPL-MCNC: 2.3 MG/DL (ref 1.6–2.3)
MCH RBC QN AUTO: 31.5 PG (ref 26.5–33)
MCHC RBC AUTO-ENTMCNC: 32.5 G/DL (ref 31.5–36.5)
MCV RBC AUTO: 97 FL (ref 78–100)
MONOCYTES # BLD AUTO: 1.2 10E9/L (ref 0–1.3)
MONOCYTES NFR BLD AUTO: 12 %
NEUTROPHILS # BLD AUTO: 6.1 10E9/L (ref 1.6–8.3)
NEUTROPHILS NFR BLD AUTO: 64 %
NRBC # BLD AUTO: 0 10*3/UL
NRBC BLD AUTO-RTO: 0 /100
NT-PROBNP SERPL-MCNC: 1970 PG/ML (ref 0–900)
O2/TOTAL GAS SETTING VFR VENT: 21 %
OXYHGB MFR BLDV: 88 %
PCO2 BLDV: 40 MM HG (ref 40–50)
PH BLDV: 7.46 PH (ref 7.32–7.43)
PLATELET # BLD AUTO: 188 10E9/L (ref 150–450)
PO2 BLDV: 61 MM HG (ref 25–47)
POTASSIUM SERPL-SCNC: 4.5 MMOL/L (ref 3.4–5.3)
POTASSIUM SERPL-SCNC: 4.6 MMOL/L (ref 3.4–5.3)
PROTHROM ACT/NOR PPP: 70 % (ref 60–140)
RBC # BLD AUTO: 5.72 10E12/L (ref 4.4–5.9)
SODIUM SERPL-SCNC: 132 MMOL/L (ref 133–144)
SODIUM SERPL-SCNC: 133 MMOL/L (ref 133–144)
WBC # BLD AUTO: 9.6 10E9/L (ref 4–11)

## 2018-10-27 PROCEDURE — 25000132 ZZH RX MED GY IP 250 OP 250 PS 637: Performed by: STUDENT IN AN ORGANIZED HEALTH CARE EDUCATION/TRAINING PROGRAM

## 2018-10-27 PROCEDURE — 99232 SBSQ HOSP IP/OBS MODERATE 35: CPT | Mod: GC | Performed by: INTERNAL MEDICINE

## 2018-10-27 PROCEDURE — 25000128 H RX IP 250 OP 636: Performed by: STUDENT IN AN ORGANIZED HEALTH CARE EDUCATION/TRAINING PROGRAM

## 2018-10-27 PROCEDURE — 85210 CLOT FACTOR II PROTHROM SPEC: CPT | Performed by: STUDENT IN AN ORGANIZED HEALTH CARE EDUCATION/TRAINING PROGRAM

## 2018-10-27 PROCEDURE — 85025 COMPLETE CBC W/AUTO DIFF WBC: CPT | Performed by: STUDENT IN AN ORGANIZED HEALTH CARE EDUCATION/TRAINING PROGRAM

## 2018-10-27 PROCEDURE — 83735 ASSAY OF MAGNESIUM: CPT | Performed by: STUDENT IN AN ORGANIZED HEALTH CARE EDUCATION/TRAINING PROGRAM

## 2018-10-27 PROCEDURE — 83880 ASSAY OF NATRIURETIC PEPTIDE: CPT | Performed by: STUDENT IN AN ORGANIZED HEALTH CARE EDUCATION/TRAINING PROGRAM

## 2018-10-27 PROCEDURE — 00000167 ZZHCL STATISTIC INR NC: Performed by: STUDENT IN AN ORGANIZED HEALTH CARE EDUCATION/TRAINING PROGRAM

## 2018-10-27 PROCEDURE — 86140 C-REACTIVE PROTEIN: CPT | Performed by: STUDENT IN AN ORGANIZED HEALTH CARE EDUCATION/TRAINING PROGRAM

## 2018-10-27 PROCEDURE — 80048 BASIC METABOLIC PNL TOTAL CA: CPT | Performed by: STUDENT IN AN ORGANIZED HEALTH CARE EDUCATION/TRAINING PROGRAM

## 2018-10-27 PROCEDURE — 85613 RUSSELL VIPER VENOM DILUTED: CPT | Performed by: STUDENT IN AN ORGANIZED HEALTH CARE EDUCATION/TRAINING PROGRAM

## 2018-10-27 PROCEDURE — 36415 COLL VENOUS BLD VENIPUNCTURE: CPT | Performed by: STUDENT IN AN ORGANIZED HEALTH CARE EDUCATION/TRAINING PROGRAM

## 2018-10-27 PROCEDURE — 85730 THROMBOPLASTIN TIME PARTIAL: CPT | Performed by: STUDENT IN AN ORGANIZED HEALTH CARE EDUCATION/TRAINING PROGRAM

## 2018-10-27 PROCEDURE — 82805 BLOOD GASES W/O2 SATURATION: CPT | Performed by: INTERNAL MEDICINE

## 2018-10-27 PROCEDURE — 21400006 ZZH R&B CCU INTERMEDIATE UMMC

## 2018-10-27 PROCEDURE — 00000401 ZZHCL STATISTIC THROMBIN TIME NC: Performed by: STUDENT IN AN ORGANIZED HEALTH CARE EDUCATION/TRAINING PROGRAM

## 2018-10-27 PROCEDURE — 40000558 ZZH STATISTIC CVC DRESSING CHANGE

## 2018-10-27 PROCEDURE — 84134 ASSAY OF PREALBUMIN: CPT | Performed by: STUDENT IN AN ORGANIZED HEALTH CARE EDUCATION/TRAINING PROGRAM

## 2018-10-27 PROCEDURE — 40000802 ZZH SITE CHECK

## 2018-10-27 PROCEDURE — 85220 BLOOC CLOT FACTOR V TEST: CPT | Performed by: STUDENT IN AN ORGANIZED HEALTH CARE EDUCATION/TRAINING PROGRAM

## 2018-10-27 RX ORDER — TORSEMIDE 20 MG/1
60 TABLET ORAL
Status: DISCONTINUED | OUTPATIENT
Start: 2018-10-27 | End: 2018-10-30 | Stop reason: HOSPADM

## 2018-10-27 RX ORDER — HYDRALAZINE HYDROCHLORIDE 10 MG/1
10 TABLET, FILM COATED ORAL 3 TIMES DAILY
Status: DISCONTINUED | OUTPATIENT
Start: 2018-10-27 | End: 2018-10-27

## 2018-10-27 RX ORDER — HYDRALAZINE HYDROCHLORIDE 10 MG/1
10 TABLET, FILM COATED ORAL EVERY 8 HOURS SCHEDULED
Status: DISCONTINUED | OUTPATIENT
Start: 2018-10-27 | End: 2018-10-27

## 2018-10-27 RX ORDER — FUROSEMIDE 10 MG/ML
80 INJECTION INTRAMUSCULAR; INTRAVENOUS 2 TIMES DAILY
Status: DISCONTINUED | OUTPATIENT
Start: 2018-10-27 | End: 2018-10-27

## 2018-10-27 RX ADMIN — TORSEMIDE 60 MG: 20 TABLET ORAL at 15:40

## 2018-10-27 RX ADMIN — DIGOXIN 250 MCG: 125 TABLET ORAL at 08:38

## 2018-10-27 RX ADMIN — Medication 3.12 MG: at 15:40

## 2018-10-27 RX ADMIN — FUROSEMIDE 80 MG: 10 INJECTION, SOLUTION INTRAVENOUS at 08:38

## 2018-10-27 RX ADMIN — RANITIDINE 150 MG: 150 TABLET, FILM COATED ORAL at 08:38

## 2018-10-27 RX ADMIN — BUPROPION HYDROCHLORIDE 150 MG: 150 TABLET, FILM COATED, EXTENDED RELEASE ORAL at 08:39

## 2018-10-27 RX ADMIN — ASPIRIN 81 MG: 81 TABLET, COATED ORAL at 08:39

## 2018-10-27 RX ADMIN — Medication 100 MG: at 08:39

## 2018-10-27 RX ADMIN — ATORVASTATIN CALCIUM 10 MG: 10 TABLET, FILM COATED ORAL at 20:59

## 2018-10-27 RX ADMIN — RANITIDINE 150 MG: 150 TABLET, FILM COATED ORAL at 20:59

## 2018-10-27 RX ADMIN — Medication 3.12 MG: at 20:59

## 2018-10-27 ASSESSMENT — ACTIVITIES OF DAILY LIVING (ADL)
ADLS_ACUITY_SCORE: 10

## 2018-10-27 NOTE — PLAN OF CARE
Problem: Patient Care Overview  Goal: Plan of Care/Patient Progress Review  D: Admitted 10/19 for decompensated HF. Hx of ICM (EF 15%), ICD, HTN, DM II and SHIRLEY    I: Monitored vitals and assessed patient status. 1G NA diet, 2L FR.   Changed: lasix 60 mg BID  PRN: trazodone    A: VSS, denies pain. Sinus rhythm. A0x4. Afebrile. Urinating adequately. Overnight oximetry to be set up by RT but unable to attain machine for tonight. Up independently.    P: Anticipate possible initiation of milrinone or LVAD this admission. Continue to assess and monitor, notify Cards 2 with concerns.

## 2018-10-27 NOTE — PROGRESS NOTES
"BP 97/69 (BP Location: Right arm)  Pulse 99  Temp 97.6  F (36.4  C) (Oral)  Resp 16  Ht 1.753 m (5' 9\")  Wt 85.5 kg (188 lb 6.4 oz)  SpO2 98%  BMI 27.82 kg/m2    Soft BP, S. Tach. A&Ox4, pleasant and cooperative with care. Independent. OK to walk on unit but order to be accompanied by staff when going to the lobby. 1gm Na diet with 2L FR. BG this . K+ 4.6, no replacement given. Lasix 80mg IVP given this AM, dose increased from 60mg.   "

## 2018-10-27 NOTE — PLAN OF CARE
Problem: Patient Care Overview  Goal: Plan of Care/Patient Progress Review  Outcome: No Change  D: Heart failure. Fluid up.  I/A: VSS. Denies pain. Sinus rhythm. Lasix drip off yesterday. Right heart cath yesterday. Potassium replaced for evening lab result. Slept all shift.  P: Continue to monitor.

## 2018-10-27 NOTE — PROGRESS NOTES
Overnight oximetry study was not started due to shortage of pulse oximetry machine used for the study.  Will start the study tomorrow night. Notified ERIN Guerrier RRT

## 2018-10-27 NOTE — PLAN OF CARE
Problem: Patient Care Overview  Goal: Plan of Care/Patient Progress Review  D: Admitted 10/19 for decompensated HF. Hx of ICM (EF 15%), ICD, HTN, DM II and SHIRLEY. Unlikely transplant candidate due to marijuana/smoking     I: Monitored vitals and assessed patient status. 1G NA diet, 2L FR. PICC line saline locked. Blood sugars BID.  Changed: PO torsemide (60mg); started captopril; dc'd hydralazine     A: VSS, denies pain. Sinus rhythm. A0x4. Afebrile. Urinating adequately. Overnight oximetry to be set up by RT (unable to attain machine for last night). Up independently.     P: Anticipate possible initiation of milrinone or LVAD this admission. Continue to assess and monitor, notify Cards 2 with concerns.

## 2018-10-28 LAB
ANION GAP SERPL CALCULATED.3IONS-SCNC: 8 MMOL/L (ref 3–14)
ANION GAP SERPL CALCULATED.3IONS-SCNC: 8 MMOL/L (ref 3–14)
BASOPHILS # BLD AUTO: 0.1 10E9/L (ref 0–0.2)
BASOPHILS NFR BLD AUTO: 0.6 %
BUN SERPL-MCNC: 26 MG/DL (ref 7–30)
BUN SERPL-MCNC: 27 MG/DL (ref 7–30)
CALCIUM SERPL-MCNC: 8.7 MG/DL (ref 8.5–10.1)
CALCIUM SERPL-MCNC: 8.9 MG/DL (ref 8.5–10.1)
CHLORIDE SERPL-SCNC: 97 MMOL/L (ref 94–109)
CHLORIDE SERPL-SCNC: 98 MMOL/L (ref 94–109)
CO2 SERPL-SCNC: 26 MMOL/L (ref 20–32)
CO2 SERPL-SCNC: 30 MMOL/L (ref 20–32)
CREAT SERPL-MCNC: 1.06 MG/DL (ref 0.66–1.25)
CREAT SERPL-MCNC: 1.25 MG/DL (ref 0.66–1.25)
DIFFERENTIAL METHOD BLD: ABNORMAL
EOSINOPHIL # BLD AUTO: 0.2 10E9/L (ref 0–0.7)
EOSINOPHIL NFR BLD AUTO: 1.8 %
ERYTHROCYTE [DISTWIDTH] IN BLOOD BY AUTOMATED COUNT: 16 % (ref 10–15)
GFR SERPL CREATININE-BSD FRML MDRD: 58 ML/MIN/1.7M2
GFR SERPL CREATININE-BSD FRML MDRD: 71 ML/MIN/1.7M2
GLUCOSE SERPL-MCNC: 102 MG/DL (ref 70–99)
GLUCOSE SERPL-MCNC: 138 MG/DL (ref 70–99)
HCT VFR BLD AUTO: 55.4 % (ref 40–53)
HGB BLD-MCNC: 17.9 G/DL (ref 13.3–17.7)
IMM GRANULOCYTES # BLD: 0 10E9/L (ref 0–0.4)
IMM GRANULOCYTES NFR BLD: 0.5 %
LYMPHOCYTES # BLD AUTO: 2.4 10E9/L (ref 0.8–5.3)
LYMPHOCYTES NFR BLD AUTO: 27.3 %
MAGNESIUM SERPL-MCNC: 2.1 MG/DL (ref 1.6–2.3)
MAGNESIUM SERPL-MCNC: 2.3 MG/DL (ref 1.6–2.3)
MCH RBC QN AUTO: 31.7 PG (ref 26.5–33)
MCHC RBC AUTO-ENTMCNC: 32.3 G/DL (ref 31.5–36.5)
MCV RBC AUTO: 98 FL (ref 78–100)
MONOCYTES # BLD AUTO: 1.1 10E9/L (ref 0–1.3)
MONOCYTES NFR BLD AUTO: 13.1 %
NEUTROPHILS # BLD AUTO: 4.9 10E9/L (ref 1.6–8.3)
NEUTROPHILS NFR BLD AUTO: 56.7 %
NRBC # BLD AUTO: 0 10*3/UL
NRBC BLD AUTO-RTO: 0 /100
PLATELET # BLD AUTO: 209 10E9/L (ref 150–450)
POTASSIUM SERPL-SCNC: 3.7 MMOL/L (ref 3.4–5.3)
POTASSIUM SERPL-SCNC: 4.2 MMOL/L (ref 3.4–5.3)
RBC # BLD AUTO: 5.64 10E12/L (ref 4.4–5.9)
SODIUM SERPL-SCNC: 132 MMOL/L (ref 133–144)
SODIUM SERPL-SCNC: 136 MMOL/L (ref 133–144)
WBC # BLD AUTO: 8.7 10E9/L (ref 4–11)

## 2018-10-28 PROCEDURE — 85025 COMPLETE CBC W/AUTO DIFF WBC: CPT | Performed by: STUDENT IN AN ORGANIZED HEALTH CARE EDUCATION/TRAINING PROGRAM

## 2018-10-28 PROCEDURE — 25000132 ZZH RX MED GY IP 250 OP 250 PS 637: Performed by: STUDENT IN AN ORGANIZED HEALTH CARE EDUCATION/TRAINING PROGRAM

## 2018-10-28 PROCEDURE — 21400006 ZZH R&B CCU INTERMEDIATE UMMC

## 2018-10-28 PROCEDURE — 80048 BASIC METABOLIC PNL TOTAL CA: CPT | Performed by: STUDENT IN AN ORGANIZED HEALTH CARE EDUCATION/TRAINING PROGRAM

## 2018-10-28 PROCEDURE — 40000802 ZZH SITE CHECK

## 2018-10-28 PROCEDURE — 36415 COLL VENOUS BLD VENIPUNCTURE: CPT | Performed by: STUDENT IN AN ORGANIZED HEALTH CARE EDUCATION/TRAINING PROGRAM

## 2018-10-28 PROCEDURE — 83735 ASSAY OF MAGNESIUM: CPT | Performed by: STUDENT IN AN ORGANIZED HEALTH CARE EDUCATION/TRAINING PROGRAM

## 2018-10-28 PROCEDURE — 99232 SBSQ HOSP IP/OBS MODERATE 35: CPT | Mod: GC | Performed by: INTERNAL MEDICINE

## 2018-10-28 RX ORDER — CAPTOPRIL 12.5 MG/1
12.5 TABLET ORAL 3 TIMES DAILY
Status: DISCONTINUED | OUTPATIENT
Start: 2018-10-28 | End: 2018-10-29

## 2018-10-28 RX ADMIN — DIGOXIN 250 MCG: 125 TABLET ORAL at 08:33

## 2018-10-28 RX ADMIN — Medication 6.25 MG: at 13:45

## 2018-10-28 RX ADMIN — RANITIDINE 150 MG: 150 TABLET, FILM COATED ORAL at 08:29

## 2018-10-28 RX ADMIN — RANITIDINE 150 MG: 150 TABLET, FILM COATED ORAL at 20:05

## 2018-10-28 RX ADMIN — Medication 3.12 MG: at 08:29

## 2018-10-28 RX ADMIN — BUPROPION HYDROCHLORIDE 150 MG: 150 TABLET, FILM COATED, EXTENDED RELEASE ORAL at 08:29

## 2018-10-28 RX ADMIN — POTASSIUM CHLORIDE 20 MEQ: 750 TABLET, EXTENDED RELEASE ORAL at 20:05

## 2018-10-28 RX ADMIN — TRAZODONE HYDROCHLORIDE 50 MG: 50 TABLET ORAL at 22:30

## 2018-10-28 RX ADMIN — Medication 12.5 MG: at 20:05

## 2018-10-28 RX ADMIN — Medication 100 MG: at 08:29

## 2018-10-28 RX ADMIN — TORSEMIDE 60 MG: 20 TABLET ORAL at 08:29

## 2018-10-28 RX ADMIN — TORSEMIDE 60 MG: 20 TABLET ORAL at 16:23

## 2018-10-28 RX ADMIN — ATORVASTATIN CALCIUM 10 MG: 10 TABLET, FILM COATED ORAL at 20:05

## 2018-10-28 RX ADMIN — ASPIRIN 81 MG: 81 TABLET, COATED ORAL at 08:28

## 2018-10-28 ASSESSMENT — ACTIVITIES OF DAILY LIVING (ADL)
ADLS_ACUITY_SCORE: 10

## 2018-10-28 NOTE — PROGRESS NOTES
Cardiology Progress Note  Danielito Chu MRN: [id number]  Age: 63 year old, : 1955  10/27/18              Changes Today:     -  Torsemide 60mg PO BID - goal to keep net even and weight to remain stable. Please records all Ins. Thank you!   - Captopril 3.125mg PO TID   - Pending completion of VAD work up and uptitration of medical regimen, patient may be able to discharge Monday/Tuesday to home with very close follow up. Communicated this to patient.         Assessment and Plan:     Danielito Chu is a 63 year old with PMH of HFrEF 2/2 NICM (EF 15%) , s/p ICD, HTN , DM II, SHIRLEY who is presenting for decompensated HF and expedited VAD/Transplant work up.      #Decompensated HFrEF 2/2 NICM (EF 15%)   #S/P ICD   Likely related to non-medical adherence in the past couple of months versus progressive decline of cardiac function . Patient initially diagnosed in  - he did well for 7-8 months however started decompensating over the past couple of months.   RHC in 2018 showed elevated filling pressures and cardiopulmonary testing showed MVo2 9.3 (pred 30.4) suggestive of poor prognosis.       RHC as above indicative of appropriate Right sided filling pressures and elevated left sided pressures.      - Diuresis: Lasix 120 mg --> lasix 5 mg/hr gtt (good output) --> 10/22 increase to 10mg and bolus 120mg --->10/24 Increase gtt to 15mg/h and Rebolus with 80mg---> 10/24 PM increase to 20mg/h---> 10/25 based on RHC values STOP furosemide gtt and start Furosemide 60mg IV BID----> 10/27 PM start Torsemide 60mg PO BID   - BB: Hold PTA Metoprolol 25 mg-XL   - ACE/ARB: Hold PTA Entresto BID (hypotension)---> Start Captopril 3.125mg TID.   - Digoxin 250 mcg qday (renal function appropriate)  - Atorvastatin 10 mg Qday  - Aspirin 81 mg qday       # Depression / Adjustment disorder   # Social Support   # LVAD work up   Appears to not have decided completely regarding need for VAD and  hoping that this isn't the time. Feels as though this may not be the right time as the patient doesn't feel sick enough and doesn't feel the immediate impetus to obtain a VAD at this point however this may be the most opportune time as he does present with appropriate right sided pressures.   - Start wellbutrin 150 mg qday   - Mental health consult   - Palliative also follow as part of VAD eval - appreciate recommendations and note.       # Symptom Control  Consider wedge pillow for sleep.      #NSVT   #s/p ICD   Noted on most recent interrogation of ICD. No events this admission.   - K>4, Mg>2       #DM II : sliding scale insulin       #SHIRLEY: CPAP       #HTN: PTA entersto and BB. Holding both for now       Prophylaxis:  DVT: Ambulate   GI: None  Disposition: Pending further diuresis and work up.   Code Status: Full      Patient was discussed with staff attending      Melissa Rehman  PGY-2 Internal Medicine  Cardiology Service          Subjective     Patient comfortable in bed. Still feeling somewhat ambilavent about LVAD. Wanting to try to keep to strict diet regimen at home and try medical therapy. No other issues overnight.   Feeling comfortable ambulating and wondering why we feel LVAD is necessary at this time           Objective     Vitals:  Temp:  [97.5  F (36.4  C)-97.7  F (36.5  C)] 97.5  F (36.4  C)  Pulse:  [87] 87  Heart Rate:  [83-87] 86  Resp:  [16-18] 18  BP: ()/(68-81) 112/72  SpO2:  [95 %-98 %] 98 %     Vitals:    10/26/18 0001 10/26/18 1100 10/27/18 0500   Weight: 86.6 kg (190 lb 14.4 oz) 85.5 kg (188 lb 6.4 oz) 85.5 kg (188 lb 6.4 oz)     Gen: AA&Ox3, no acute distress, resting in bed comfortably.   BACK: no CVA tenderness, no midline bony tenderness  PULM/THORAX: Clear to auscultation bilaterally, no rales/stridor/wheezes  CV:RRR, S1 and S2 appreciated, no extra heart sounds, murmurs or rub auscultated. No clear  JVD  ABD: osoft, nt and nd, bs +   EXT: no significant le edema              Data:      Labs reviewed. Pertinent for :  Cr 1.2, K - 4.5, Na - 132, BUN 32 rising.         Medications     Medications    aspirin  81 mg Oral Daily     atorvastatin  10 mg Oral Daily     buPROPion  150 mg Oral Daily     captopril  3.125 mg Oral TID     digoxin  250 mcg Oral Daily     ranitidine  150 mg Oral BID     sodium chloride (PF)  10 mL Intracatheter Q7 Days     vitamin  B-1  100 mg Oral Daily     torsemide  60 mg Oral BID       - MEDICATION INSTRUCTIONS -

## 2018-10-28 NOTE — PROGRESS NOTES
Cardiology Progress Note  Danielito Chu MRN: [id number]  Age: 63 year old, : 1955  10/27/18              Changes Today:     -   Increase Captopril to 6.25mg, then if tolerating increase to 12.5mg this evening. If tolerating 12.5mg TID, would be equivalent of  which would be an equivalent of 2.5mg Lisinopril. WIll convert prior to discharge.   - Continue same torsemide dose  60mg BID     Dispo: Pending discussion with LVAD coordinators and uptitration of medications discharge late          Assessment and Plan:     Danielito Chu is a 63 year old with PMH of HFrEF 2/2 NICM (EF 15%) , s/p ICD, HTN , DM II, SHIRLEY who is presenting for decompensated HF and expedited VAD/Transplant work up.      #Decompensated HFrEF 2/2 NICM (EF 15%)   #S/P ICD   Likely related to non-medical adherence in the past couple of months versus progressive decline of cardiac function . Patient initially diagnosed in  - he did well for 7-8 months however started decompensating over the past couple of months.   RHC in 2018 showed elevated filling pressures and cardiopulmonary testing showed MVo2 9.3 (pred 30.4) suggestive of poor prognosis.       RHC as above indicative of appropriate Right sided filling pressures and elevated left sided pressures.      - Diuresis: Lasix 120 mg --> lasix 5 mg/hr gtt (good output) --> 10/22 increase to 10mg and bolus 120mg --->10/24 Increase gtt to 15mg/h and Rebolus with 80mg---> 10/24 PM increase to 20mg/h---> 10/25 based on RHC values STOP furosemide gtt and start Furosemide 60mg IV BID----> 10/27 PM start Torsemide 60mg PO BID   - BB: Hold PTA Metoprolol 25 mg-XL   - ACE/ARB: Hold PTA Entresto BID (hypotension)---> Captopril (uptitrate as above)   - Digoxin 250 mcg qday (renal function appropriate)  - Statin: Atorvastatin 10 mg Qday  - ASA: Aspirin 81 mg qday    - ICD in place     # Depression / Adjustment disorder   # Social Support   #  LVAD work up   Appears to not have decided completely regarding need for VAD and hoping that this isn't the time. Feels as though this may not be the right time as the patient doesn't feel sick enough and doesn't feel the immediate impetus to obtain a VAD at this point. As a medical team, decision made with patient and sister to medically optimize , trial such and have patient return for close follow up     # Symptom Control  Consider wedge pillow for sleep.      # Concern for SHIRLEY   Significant desaturations overnight per overnight oximetry study. Would jesseley qualify for CPAP. Has home O2. Not interested in CPAP as cannot tolerate mask while asleep     #NSVT   #s/p ICD   Noted on most recent interrogation of ICD. No events this admission.   - K>4, Mg>2       #DM II : sliding scale insulin      #HTN: PTA entersto and BB. Holding both for now   #Polycythemia: persistently elevated between 16-18 since 3/2017. suspect perhaps 2/2 sleep disordered breathing    Prophylaxis:  DVT: Ambulate   GI: None  Code Status: Full      Patient was discussed with staff attending      Melissa Rehman  PGY-2 Internal Medicine  Cardiology Service          Subjective     No acute events overnight.   Did not sleep well overnight due to oximetry study alarms beeping. Wanting to sleep more this morning. Seen again with team and patient in better spirits after resting more.           Objective     Vitals:  Temp:  [97  F (36.1  C)-98  F (36.7  C)] 97.6  F (36.4  C)  Pulse:  [84-87] 84  Heart Rate:  [80-89] 83  Resp:  [16-18] 16  BP: ()/(66-77) 103/77  SpO2:  [90 %-98 %] 93 %     Vitals:    10/26/18 1100 10/27/18 0500 10/28/18 0600   Weight: 85.5 kg (188 lb 6.4 oz) 85.5 kg (188 lb 6.4 oz) 85.6 kg (188 lb 11.2 oz)     Gen: AA&Ox3, no acute distress, resting in bed comfortably.   BACK: no CVA tenderness, no midline bony tenderness  PULM/THORAX: Clear to auscultation bilaterally, no rales/stridor/wheezes  CV:RRR, S1 and S2 appreciated,  no extra heart sounds, murmurs or rub auscultated. No clear  JVD  ABD: soft, nt and nd, bs +   EXT: no significant le edema             Data:      Labs reviewed. Pertinent for :  Cr 1.2, K - 4.5, Na - 132, BUN 32 rising.         Medications     Medications    aspirin  81 mg Oral Daily     atorvastatin  10 mg Oral Daily     buPROPion  150 mg Oral Daily     captopril  6.25 mg Oral TID     digoxin  250 mcg Oral Daily     ranitidine  150 mg Oral BID     sodium chloride (PF)  10 mL Intracatheter Q7 Days     vitamin  B-1  100 mg Oral Daily     torsemide  60 mg Oral BID       - MEDICATION INSTRUCTIONS -

## 2018-10-28 NOTE — PLAN OF CARE
Problem: Patient Care Overview  Goal: Plan of Care/Patient Progress Review  Outcome: No Change  [id number]: A&Ox4, VSS on RA. O2 sleep oximetry study tonight for SHIRLEY. Up ad moshe. Voiding adequate amounts. Transitioned to PO Terbutaline, off Lasix gtt. R) PICC SL. Patient debating LVAD. Possibly to go home on milrinone infusion on Monday or Tuesday.

## 2018-10-28 NOTE — PLAN OF CARE
Problem: Patient Care Overview  Goal: Plan of Care/Patient Progress Review  Outcome: No Change  D: Heart failure. Fluid up.  I/A: VSS. Denies pain. Sinus rhythm.  Oximetry study overnight with many alarms. O2 placed initially at 2 Lpm, increased to 4 Lpm with continuing alarms.   P: Continue to monitor.

## 2018-10-28 NOTE — PLAN OF CARE
Problem: Patient Care Overview  Goal: Plan of Care/Patient Progress Review  D: Admitted 10/19 for decompensated HF. Hx of ICM (EF 15%), ICD, HTN, DM II and SHIRLEY. Unlikely transplant candidate due to marijuana/smoking      I: Monitored vitals and assessed patient status. 1G NA diet, 2L FR. PICC line saline locked. Blood sugars BID.  Changed: captopril increased to 6.25      A: VSS, denies pain. Sinus rhythm. A0x4. Afebrile. Urinating adequately. Up independently.      P: Anticipate medical optimization and possible discharge home Monday night or Tuesday. Continue to assess and monitor, notify Cards 2 with concerns.

## 2018-10-29 ENCOUNTER — APPOINTMENT (OUTPATIENT)
Dept: OCCUPATIONAL THERAPY | Facility: CLINIC | Age: 63
DRG: 287 | End: 2018-10-29
Attending: INTERNAL MEDICINE
Payer: COMMERCIAL

## 2018-10-29 ENCOUNTER — DOCUMENTATION ONLY (OUTPATIENT)
Dept: CARDIOLOGY | Facility: CLINIC | Age: 63
End: 2018-10-29

## 2018-10-29 LAB
ANION GAP SERPL CALCULATED.3IONS-SCNC: 5 MMOL/L (ref 3–14)
ANION GAP SERPL CALCULATED.3IONS-SCNC: 7 MMOL/L (ref 3–14)
BASOPHILS # BLD AUTO: 0.1 10E9/L (ref 0–0.2)
BASOPHILS NFR BLD AUTO: 0.8 %
BUN SERPL-MCNC: 26 MG/DL (ref 7–30)
BUN SERPL-MCNC: 26 MG/DL (ref 7–30)
CALCIUM SERPL-MCNC: 9 MG/DL (ref 8.5–10.1)
CALCIUM SERPL-MCNC: 9.1 MG/DL (ref 8.5–10.1)
CHLORIDE SERPL-SCNC: 98 MMOL/L (ref 94–109)
CHLORIDE SERPL-SCNC: 98 MMOL/L (ref 94–109)
CO2 SERPL-SCNC: 29 MMOL/L (ref 20–32)
CO2 SERPL-SCNC: 30 MMOL/L (ref 20–32)
CREAT SERPL-MCNC: 1.02 MG/DL (ref 0.66–1.25)
CREAT SERPL-MCNC: 1.07 MG/DL (ref 0.66–1.25)
DIFFERENTIAL METHOD BLD: ABNORMAL
EOSINOPHIL # BLD AUTO: 0.2 10E9/L (ref 0–0.7)
EOSINOPHIL NFR BLD AUTO: 1.9 %
ERYTHROCYTE [DISTWIDTH] IN BLOOD BY AUTOMATED COUNT: 15.9 % (ref 10–15)
GFR SERPL CREATININE-BSD FRML MDRD: 70 ML/MIN/1.7M2
GFR SERPL CREATININE-BSD FRML MDRD: 74 ML/MIN/1.7M2
GLUCOSE SERPL-MCNC: 120 MG/DL (ref 70–99)
GLUCOSE SERPL-MCNC: 91 MG/DL (ref 70–99)
HCT VFR BLD AUTO: 55.8 % (ref 40–53)
HGB BLD-MCNC: 17.7 G/DL (ref 13.3–17.7)
IMM GRANULOCYTES # BLD: 0 10E9/L (ref 0–0.4)
IMM GRANULOCYTES NFR BLD: 0.4 %
LYMPHOCYTES # BLD AUTO: 2.1 10E9/L (ref 0.8–5.3)
LYMPHOCYTES NFR BLD AUTO: 25.2 %
MAGNESIUM SERPL-MCNC: 2.1 MG/DL (ref 1.6–2.3)
MAGNESIUM SERPL-MCNC: 2.2 MG/DL (ref 1.6–2.3)
MCH RBC QN AUTO: 31.3 PG (ref 26.5–33)
MCHC RBC AUTO-ENTMCNC: 31.7 G/DL (ref 31.5–36.5)
MCV RBC AUTO: 99 FL (ref 78–100)
MONOCYTES # BLD AUTO: 1.2 10E9/L (ref 0–1.3)
MONOCYTES NFR BLD AUTO: 13.6 %
NEUTROPHILS # BLD AUTO: 4.9 10E9/L (ref 1.6–8.3)
NEUTROPHILS NFR BLD AUTO: 58.1 %
NRBC # BLD AUTO: 0 10*3/UL
NRBC BLD AUTO-RTO: 0 /100
PLATELET # BLD AUTO: 211 10E9/L (ref 150–450)
POTASSIUM SERPL-SCNC: 4 MMOL/L (ref 3.4–5.3)
POTASSIUM SERPL-SCNC: 4.2 MMOL/L (ref 3.4–5.3)
POTASSIUM SERPL-SCNC: 6 MMOL/L (ref 3.4–5.3)
POTASSIUM SERPL-SCNC: 6.3 MMOL/L (ref 3.4–5.3)
PREALB SERPL IA-MCNC: 14 MG/DL (ref 15–45)
RBC # BLD AUTO: 5.66 10E12/L (ref 4.4–5.9)
SODIUM SERPL-SCNC: 134 MMOL/L (ref 133–144)
SODIUM SERPL-SCNC: 135 MMOL/L (ref 133–144)
WBC # BLD AUTO: 8.5 10E9/L (ref 4–11)

## 2018-10-29 PROCEDURE — 25000132 ZZH RX MED GY IP 250 OP 250 PS 637: Performed by: STUDENT IN AN ORGANIZED HEALTH CARE EDUCATION/TRAINING PROGRAM

## 2018-10-29 PROCEDURE — 99232 SBSQ HOSP IP/OBS MODERATE 35: CPT | Mod: GC | Performed by: INTERNAL MEDICINE

## 2018-10-29 PROCEDURE — 83735 ASSAY OF MAGNESIUM: CPT | Performed by: STUDENT IN AN ORGANIZED HEALTH CARE EDUCATION/TRAINING PROGRAM

## 2018-10-29 PROCEDURE — 36592 COLLECT BLOOD FROM PICC: CPT | Performed by: STUDENT IN AN ORGANIZED HEALTH CARE EDUCATION/TRAINING PROGRAM

## 2018-10-29 PROCEDURE — 94762 N-INVAS EAR/PLS OXIMTRY CONT: CPT

## 2018-10-29 PROCEDURE — 80048 BASIC METABOLIC PNL TOTAL CA: CPT | Performed by: STUDENT IN AN ORGANIZED HEALTH CARE EDUCATION/TRAINING PROGRAM

## 2018-10-29 PROCEDURE — 40000275 ZZH STATISTIC RCP TIME EA 10 MIN

## 2018-10-29 PROCEDURE — 36415 COLL VENOUS BLD VENIPUNCTURE: CPT | Performed by: STUDENT IN AN ORGANIZED HEALTH CARE EDUCATION/TRAINING PROGRAM

## 2018-10-29 PROCEDURE — 84132 ASSAY OF SERUM POTASSIUM: CPT | Performed by: INTERNAL MEDICINE

## 2018-10-29 PROCEDURE — 85025 COMPLETE CBC W/AUTO DIFF WBC: CPT | Performed by: STUDENT IN AN ORGANIZED HEALTH CARE EDUCATION/TRAINING PROGRAM

## 2018-10-29 PROCEDURE — 36415 COLL VENOUS BLD VENIPUNCTURE: CPT | Performed by: INTERNAL MEDICINE

## 2018-10-29 PROCEDURE — 97110 THERAPEUTIC EXERCISES: CPT | Mod: GO

## 2018-10-29 PROCEDURE — 40000133 ZZH STATISTIC OT WARD VISIT

## 2018-10-29 PROCEDURE — 21400006 ZZH R&B CCU INTERMEDIATE UMMC

## 2018-10-29 RX ORDER — TORSEMIDE 20 MG/1
60 TABLET ORAL
Qty: 180 TABLET | Refills: 0 | Status: SHIPPED | OUTPATIENT
Start: 2018-10-30 | End: 2018-11-13

## 2018-10-29 RX ORDER — FLUORIDE TOOTHPASTE
10 TOOTHPASTE DENTAL 4 TIMES DAILY
Status: DISCONTINUED | OUTPATIENT
Start: 2018-10-29 | End: 2018-10-30 | Stop reason: HOSPADM

## 2018-10-29 RX ORDER — BUPROPION HYDROCHLORIDE 150 MG/1
150 TABLET ORAL DAILY
Qty: 60 TABLET | Refills: 0 | Status: ON HOLD | OUTPATIENT
Start: 2018-10-29 | End: 2018-11-28

## 2018-10-29 RX ORDER — CAPTOPRIL 12.5 MG/1
6.25 TABLET ORAL 3 TIMES DAILY
Qty: 90 TABLET | Refills: 0 | Status: SHIPPED | OUTPATIENT
Start: 2018-10-30 | End: 2018-10-30

## 2018-10-29 RX ORDER — LANOLIN ALCOHOL/MO/W.PET/CERES
100 CREAM (GRAM) TOPICAL DAILY
Qty: 60 TABLET | Refills: 0 | Status: SHIPPED | OUTPATIENT
Start: 2018-10-29 | End: 2018-12-20

## 2018-10-29 RX ORDER — FLUORIDE TOOTHPASTE
10 TOOTHPASTE DENTAL 4 TIMES DAILY PRN
Qty: 59 ML | Refills: 0 | Status: ON HOLD | OUTPATIENT
Start: 2018-10-29 | End: 2019-05-09

## 2018-10-29 RX ADMIN — ASPIRIN 81 MG: 81 TABLET, COATED ORAL at 09:00

## 2018-10-29 RX ADMIN — Medication 12.5 MG: at 09:01

## 2018-10-29 RX ADMIN — Medication 10 ML: at 20:39

## 2018-10-29 RX ADMIN — DIGOXIN 250 MCG: 125 TABLET ORAL at 09:00

## 2018-10-29 RX ADMIN — TORSEMIDE 60 MG: 20 TABLET ORAL at 09:00

## 2018-10-29 RX ADMIN — POTASSIUM CHLORIDE 20 MEQ: 750 TABLET, EXTENDED RELEASE ORAL at 09:01

## 2018-10-29 RX ADMIN — ATORVASTATIN CALCIUM 10 MG: 10 TABLET, FILM COATED ORAL at 20:39

## 2018-10-29 RX ADMIN — BUPROPION HYDROCHLORIDE 150 MG: 150 TABLET, FILM COATED, EXTENDED RELEASE ORAL at 09:00

## 2018-10-29 RX ADMIN — Medication 6.25 MG: at 20:39

## 2018-10-29 RX ADMIN — Medication 10 ML: at 17:25

## 2018-10-29 RX ADMIN — RANITIDINE 150 MG: 150 TABLET, FILM COATED ORAL at 09:00

## 2018-10-29 RX ADMIN — Medication 12.5 MG: at 13:50

## 2018-10-29 RX ADMIN — RANITIDINE 150 MG: 150 TABLET, FILM COATED ORAL at 20:39

## 2018-10-29 RX ADMIN — TORSEMIDE 60 MG: 20 TABLET ORAL at 15:57

## 2018-10-29 RX ADMIN — TRAZODONE HYDROCHLORIDE 50 MG: 50 TABLET ORAL at 22:45

## 2018-10-29 RX ADMIN — Medication 100 MG: at 09:00

## 2018-10-29 ASSESSMENT — ACTIVITIES OF DAILY LIVING (ADL)
ADLS_ACUITY_SCORE: 10

## 2018-10-29 NOTE — PROGRESS NOTES
Neuro: A&Ox4.   Cardiac: Afebrile, VSS, NSR.   Respiratory: RA   GI/: Voiding spontaneously, diuresing with PO torsemide. No BM this shift.   Diet/appetite: Tolerating 1g Na diet and 2L FR. Denies nausea. Blood sugars ordered BID   Activity: Up ad moshe     Pain: . Denies   Skin: No new deficits noted.  Lines: PICC heparin locked     Pt has been resting comfortably throughout night, will continue to monitor and follow plan of care.

## 2018-10-29 NOTE — PROGRESS NOTES
Update on Mr. Chu's LVAD evaluation:    A signed release of information form was sent to [facility] (fax [phone], phone [phone]) requesting Mr. Chu's recent colonoscopy result.    Mr. Chu agreed to schedule the dental work with his dentist for the near future.

## 2018-10-29 NOTE — PROGRESS NOTES
Cardiology Progress Note  Danielito Chu MRN: [id number]  Age: 63 year old, : 1955  10/29/18              Changes Today:     -  Patient complaints of ongoing issues with balance throughout the day. Captopril at 12.5mg TID attempted however based on symptoms and mild downtrend in BP, appears he is NOT tolerating. Will decrease to 6.25mg TID.   - Continue same torsemide dose 60mg BID   - Discused LVAD work up with LVAD coordinator.    Missing work up includes:    1) dentist - to be completed outpatient     2) CV surgery consult - either can be completed 10/30 OR when he returns if further decompensated at the time.    3) 6 minute walk - ordered. Please complete by AM 10/30   4) Nutrition consult completed 10/23  Dispo: Pending discussion with LVAD coordinators and uptitration of medications discharge late          Assessment and Plan:     Danielito Chu is a 63 year old with PMH of HFrEF 2/2 NICM (EF 15%) , s/p ICD, HTN , DM II, SHIRLEY who is presenting for decompensated HF and expedited VAD/Transplant work up.      #Decompensated HFrEF 2/2 NICM (EF 15%)   #S/P ICD   Likely related to non-medical adherence in the past couple of months versus progressive decline of cardiac function . Patient initially diagnosed in  - he did well for 7-8 months however started decompensating over the past couple of months.   RHC in 2018 showed elevated filling pressures and cardiopulmonary testing showed MVo2 9.3 (pred 30.4) suggestive of poor prognosis.   RHC as above indicative of appropriate Right sided filling pressures and elevated left sided pressures.      - Diuresis: Lasix 120 mg --> lasix 5 mg/hr gtt (good output) --> 10/22 increase to 10mg and bolus 120mg --->10/24 Increase gtt to 15mg/h and Rebolus with 80mg---> 10/24 PM increase to 20mg/h---> 10/25 based on RHC values STOP furosemide gtt and start Furosemide 60mg IV BID----> 10/27 PM start Torsemide 60mg  "PO BID   - BB: Hold PTA Metoprolol 25 mg-XL   - ACE/ARB: Hold PTA Entresto BID (hypotension)---> Captopril (uptitrate as above)   - Digoxin 250 mcg qday (renal function appropriate)  - Statin: Atorvastatin 10 mg Qday  - ASA: Aspirin 81 mg qday    - ICD in place     # Depression / Adjustment disorder   # Social Support   # LVAD work up   Appears to not have decided completely regarding need for VAD and hoping that this isn't the time. Decision made to discharge to home with medical therapy and ultimately     # Symptom Control  Consider wedge pillow for sleep.      # Concern for SHIRLEY   Significant desaturations overnight per overnight oximetry study. Would jesseley qualify for CPAP. Has home O2. Not interested in CPAP as cannot tolerate mask while asleep     #NSVT   #s/p ICD   Noted on most recent interrogation of ICD. No events this admission.   - K>4, Mg>2       #DM II : sliding scale insulin      #HTN: PTA entersto and BB. Holding both for now   #Polycythemia: persistently elevated between 16-18 since 3/2017. suspect perhaps 2/2 sleep disordered breathing    Prophylaxis:  DVT: Ambulate   GI: None  Code Status: Full      Patient was discussed with staff attending      Melissa Rehman  PGY-2 Internal Medicine  Cardiology Service          Subjective     No acute events overnight. Feels ok this AM and ongoing desire to be discharged. Ambulating however feels a little unsteady on his feet while walking. States he feels \"drunk\" when walking around           Objective     Vitals:  Temp:  [97.4  F (36.3  C)-98  F (36.7  C)] 97.8  F (36.6  C)  Pulse:  [93] 93  Heart Rate:  [] 77  Resp:  [16-18] 18  BP: ()/(64-77) 92/64  SpO2:  [91 %-98 %] 97 %     Vitals:    10/27/18 0500 10/28/18 0600 10/29/18 0609   Weight: 85.5 kg (188 lb 6.4 oz) 85.6 kg (188 lb 11.2 oz) 84.3 kg (185 lb 12.8 oz)     Gen: AA&Ox3, no acute distress, resting in bed comfortably.   PULM/THORAX: Clear to auscultation    CV:RRR, S1 and S2 " appreciated, no extra heart sounds, murmurs or rub auscultated. No clear  JVD appreciable while at 45 degrees   ABD: soft, nt and nd, bs +   EXT: no significant le edema           Data:      Labs reviewed. Pertinent for :  Cr 1.02, Na - 134, BUN - 26, Co2 - 30, Mg - 2.1         Medications     Medications    aspirin  81 mg Oral Daily     atorvastatin  10 mg Oral Daily     buPROPion  150 mg Oral Daily     captopril  12.5 mg Oral TID     digoxin  250 mcg Oral Daily     ranitidine  150 mg Oral BID     sodium chloride (PF)  10 mL Intracatheter Q7 Days     vitamin  B-1  100 mg Oral Daily     torsemide  60 mg Oral BID       - MEDICATION INSTRUCTIONS -

## 2018-10-29 NOTE — PROGRESS NOTES
Care Coordinator Progress Note    Admission Date/Time:  10/19/2018  Attending MD:  Romaine Mondragon MD  Data  Chart reviewed, discussed with interdisciplinary team.   Patient was admitted for:    Depression, unspecified depression type  Acute on chronic systolic heart failure (H)  Acute on chronic systolic congestive heart failure (H).    Concerns with insurance coverage for discharge needs: None stated by pt.  Current Living Situation: Patient said that he lives alone but his sisterFani will be staying with him.   Support System: Supportive and Involved sisterFani.   Services Involved: none prior to this admission.   Transportation at Discharge: Fani will provide pt with a ride home upon discharge.   Transportation to Medical Appointments:   - Name of caregiver: Fani Chu.   Barriers to Discharge: medical condition.     Coordination of Care and Referrals: No home care needs per pt.    Assessment  PT is recommending OPCR; pt is in agreement with this plan.    Plan  Anticipated Discharge Date:  TBD  Anticipated Discharge Plan:  Discharge to home with outpt f/u.     GILBERT SOTO RN BSN  Care Coordinator Unit   [phone]

## 2018-10-29 NOTE — PLAN OF CARE
"Problem: Patient Care Overview  Goal: Plan of Care/Patient Progress Review  Outcome: Improving  D: Patient with HFrEQ 2/2 NICM (EF = 15%) admitted 10/19/18 for decompensated HF d/t medication and diet non-compliance. Worked up for LVAD vs heart transplant. PMHx ICD, HTN, DM2, and SHIRLEY    I/A: Monitored vitals and assessed patient status. A0x4. VSS. NSR 70's-80's. Afebrile. Urinating adequately. Patient reports \"drifting\" from side to side with ambulation. No c/o lightheadedness or dizziness. Cards 2 aware and contributing to up-titration of Captopril. Dose decreased beginning this evening.     *Lab draws ordered for lab collect and must not be drawn from PICC due to labs being continually hemolyzed**     P: Continue to monitor patient status and report changes to treatment team. Patient does not want LVAD or transplant at this time. Plan for discharge to home Tuesday.      "

## 2018-10-29 NOTE — PROGRESS NOTES
Patient's morning labs resulted with Potassium 6.3.    Alerted by lab that specimen was contaminated by hemolysis and that potassium level is most likely a false high.    New order for a Potassium level via  instead of through line.     Will await new results.

## 2018-10-29 NOTE — DISCHARGE SUMMARY
[facility]   Cardiology II Service / Advanced Heart Failure  Discharge Summary     Danielito Chu MRN# [id number]   YOB: 1955 Age: 63 year old     DATE OF ADMISSION:  10/19/2018  DATE OF DISCHARGE: 10/30/2018  ADMITTING PROVIDER: Romaine Mondragon MD  DISCHARGE PROVIDER: Nichelle Steward MD  PRIMARY PROVIDER: Bashir Hines         Reason for Admission:   Shortness of Breath           Discharge Diagnosis:   Decompensated HFrEF   NICM (EF 15%) S/p ICD ([employer] implanted 4/7/17)         Follow Up:   1) Close follow up with Heart Failure CORE clinic within 2 weeks    - Labs BMP, Mg    2) Primary Care Physician within 1-2 weeks to follow up on hospitalization    - Consider discussion regarding sleep study and CPAP use    - Consider discussion regarding mental health, depression. Started on Wellbutrin while admitted.          Pending Results:   None         Hospital Course by Problem:    Danielito Chu is a 63 year old with PMH of HFrEF 2/2 NICM (EF 15%) , s/p ICD, HTN , DM II, SHIRLEY who is presenting for decompensated HF and expedited VAD/Transplant work up given poor Cardiopulmonary stress test results and poor prognosis.     Throughout admission patient was diuresed to euvolemia and medical therapy was optimized as patient was mentally unprepared and quite reluctant to undergo LVAD surgery this admission. However medically, based on RHC showing low CI, elevated SVR and appropriate right sided filling pressures, it was deemed he will likely need an LVAD in near future. Ultimately via shared decision making patient discharged with afterload optimization, diuretic titration and very close medical follow up.   -----------  #Decompensated HFrEF 2/2 NICM (EF 15%)   #S/P ICD ([employer] implanted 4/7/2017)  Primary Cardiologist: Dr.Rebecca Watkins   EDW: 188lb, Discharge Weight: 185lb  Diagnosed in 2017 (etiology of NICM remains unclear). Did well initially however  "began decompensating over past 7-8 months likely related to non-medical adherence in the past few months versus component of progressive decline in cardiac function. Admitted given decompensation and poor cardiopulmonary stress test with MVo2 9.3 (pred 30.4) suggestive of poor prognosis. RHC this admission (10/26/18) showing RA - 9/8/7, RV - 43/23/35, PAW - 23/32/21, PVR - 3.4, SVR- 1950,  Thermodilution CI - 1.9, Timmy CI - 1.6 indicative of elevated left sided filling pressures, normal right sided filling pressures and reduced cardiac output.   Given this, patient was diuresed and began an expedited LVAD work up however patient was quite reluctant to undergo VAD surgery at this time as he \"doesn't feel sick enough\" to undergo such an invasive change (please see Neuropsychology note () on 10/25 and Palliative note () on 10/25). Given patient's preference, he was counseled on strict diet and fluid restriction, diuretic regimen was up-titrated and afterload reduction optimized.   ---  - Diuretic: Torsemide 60mg PO BID  - Electrolyte: Potassium Chloride 20 mEq daily  - ACE: Captpril 6.25mg PO TID (previously did not tolerate entresto 2/2 hypotension, did not tolerate Captopril 12.5mg PO TID due to lightheadedness and gait instability)  - ORSA Ag: Discontinued in setting of hyperkalemia in prior admission.  - BB: HOLD. Low BB and unable to tolerate at this time. Low CI. (PTA was on Metoprolol XL 25mg Qday)  - Continue Digoxin 250mcg Qday  - ASA: 81 mg Qday   - Statin: Atorvastatin 10mg Qday   - ICD: [employer] implanted 4/7/18   - Strict 1.5-2L fluid restriction, Low 1gm Na diet   - LVAD work up completed  Apart from 1) Cardiothoracic Surgery consultation and 2) Dental evaluation which will need to be completed outpatient.     # NSVT : Noted on most recent interrogation of ICD. No events this admission.     # Depression: Feels lonely and upset with respect to his health condition and lack of " "significant support. However, sister involved in cares and quite supportive this admissiom. No SI or HI. May benefit in ongoing follow up with health psychology and/or PCP. Started on Wellbutrin while admitted.      # Concern for SHIRLEY : Overnight Oximetry completed showing significant desaturations overnifht to mid 80s. Jr geronimo benefit from sleep study and evaluation for SHIRLEY however patient unwilling to use CPAP due to intolerance of mask.   - Consider OP primary care follow up     # Polycythemia: Likely secondary to sleep disordered breathing. No additional work up completed.     # T2DM (HgbA1c - 6.4 10/24/18): Continue PTA Metformin     Physical Exam on day of Discharge:  Blood pressure 102/85, pulse 93, temperature 97.5  F (36.4  C), temperature source Oral, resp. rate 18, height 1.753 m (5' 9\"), weight 83.9 kg (185 lb), SpO2 98 %.  Estimated Dry Weight: 188 Lb  General:  Alert, not in respiratory or painful distress, Not toxic looking, afebrile, not pale, not dehydrated.  Resting comfortably in bed.    HEENT: anicteric sclera, PERRL, EOMI.    CV: RRR, nl S1/S2, no S3/S4 appreciated, no m/r/g.    Lungs: CTAB, no wheezing/crackles, no cough  Abd: Soft, non-tender.  No rebound or guarding.   Normoactive BS.     Ext: No significant edema in the lower extremities bilaterally.    Skin: No lesions.    Neuro: A&Ox3.  CN II-XII intact.  5/5  strength and dorsi/plantar flexion bilaterally.  Moving all extremities.      Lab Studies on Day of Discharge:   Last CBC:   Recent Labs   Lab Test  10/28/18   0611   WBC  8.7   RBC  5.64   HGB  17.9*   HCT  55.4*   MCV  98   MCH  31.7   MCHC  32.3   RDW  16.0*   PLT  209       Last CMP:  Recent Labs   Lab Test  10/28/18   1754   10/24/18   0634   NA  132*   < >  132*   POTASSIUM  3.7   < >  4.7   CHLORIDE  98   < >  97   ASHLEE  8.9   < >  9.0   CO2  26   < >  25   BUN  27   < >  31*   CR  1.06   < >  1.02   GLC  138*   < >  137*   AST   --    --   30   ALT   --    --   14 "   BILITOTAL   --    --   2.3*   ALBUMIN   --    --   3.6   PROTTOTAL   --    --   8.0   ALKPHOS   --    --   77    < > = values in this interval not displayed.            Procedures & Significant Findings:   RHC this admission (10/26/18) showing RA - 9/8/7, RV - 43/23/35, PAW - 23/32/21, PVR - 3.4, SVR- 1950,  Thermodilution CI - 1.9, Timmy CI - 1.6     LVAD work up completed apart from Cardiothoracic Consultation and Dental Evaluation          Consultations:   Palliative Care   Psychiatry   Neuropsychological Evaluation         Discharge Medications:     Current Discharge Medication List      START taking these medications    Details   artificial saliva (BIOTENE DRY MOUTHWASH) LIQD liquid Swish and spit 10 mLs in mouth 4 times daily as needed for dry mouth  Qty: 59 mL, Refills: 0    Associated Diagnoses: Acute on chronic systolic congestive heart failure (H)      buPROPion (WELLBUTRIN XL) 150 MG 24 hr tablet Take 1 tablet (150 mg) by mouth daily  Qty: 60 tablet, Refills: 0    Associated Diagnoses: Depression, unspecified depression type      captopril (CAPOTEN) 12.5 MG tablet Take 0.5 tablets (6.25 mg) by mouth 3 times daily  Qty: 90 tablet, Refills: 0    Associated Diagnoses: Acute systolic congestive heart failure (H)      Potassium Chloride ER 20 MEQ TBCR Take 1 tablet (20 mEq) by mouth daily  Qty: 90 tablet, Refills: 1    Associated Diagnoses: Acute on chronic systolic congestive heart failure (H)      thiamine 100 MG tablet Take 1 tablet (100 mg) by mouth daily  Qty: 60 tablet, Refills: 0    Associated Diagnoses: Acute on chronic systolic heart failure (H)         CONTINUE these medications which have CHANGED    Details   torsemide (DEMADEX) 20 MG tablet Take 3 tablets (60 mg) by mouth 2 times daily  Qty: 180 tablet, Refills: 0    Associated Diagnoses: Acute on chronic systolic congestive heart failure (H)         CONTINUE these medications which have NOT CHANGED    Details   aspirin EC 81 MG EC tablet Take 1  tablet (81 mg) by mouth daily  Qty: 30 tablet, Refills: 3    Associated Diagnoses: Acute systolic congestive heart failure (H)      atorvastatin (LIPITOR) 10 MG tablet Take 1 tablet (10 mg) by mouth daily  Qty: 90 tablet, Refills: 3    Associated Diagnoses: Acute systolic congestive heart failure (H)      digoxin (LANOXIN) 250 MCG tablet TAKE 1 TABLET (250 MCG) BY MOUTH DAILY  Qty: 90 tablet, Refills: 3    Associated Diagnoses: Acute on chronic systolic heart failure (H)      metFORMIN (GLUCOPHAGE) 500 MG tablet Take 1 tablet (500 mg) by mouth 2 times daily (with meals)    Associated Diagnoses: Chronic systolic heart failure (H); Acute systolic congestive heart failure (H)      sildenafil (VIAGRA) 50 MG tablet Take 1 tablet (50 mg) by mouth daily as needed  Qty: 20 tablet, Refills: 1    Associated Diagnoses: Drug-induced impotence      traZODone (DESYREL) 100 MG tablet TAKE 1 TABLET BY MOUTH AT BEDTIME AS NEEDED FOR SLEEP  Qty: 30 tablet, Refills: 1    Associated Diagnoses: Insomnia, unspecified type         STOP taking these medications       metoprolol succinate (TOPROL-XL) 25 MG 24 hr tablet Comments:   Reason for Stopping:         potassium chloride SA (K-DUR/KLOR-CON M) 20 MEQ CR tablet Comments:   Reason for Stopping:         sacubitril-valsartan (ENTRESTO) 24-26 MG per tablet Comments:   Reason for Stopping:                    Discharge Instructions and Follow-Up:     Discharge Procedure Orders  Medication Therapy Management Referral   Referral Type: Med Therapy Management     Cardiac Rehab Referral   Standing Status: Future  Standing Exp. Date: 10/28/19   Referral Type: Rehab Therapy Cardiac Therapy     Reason for your hospital stay   Order Comments: ,  You were admitted with worsening of your known heart failure. We believe this is a progression of your heart failure and that you will likely need an LVAD soon given your right heart catheterization values and your cardiopulmonary stress test as well  as recurrent admissions for worsening symptoms related to your heart failure. You underwent work up to eventually receive an LVAD however you were reluctant to do so this admission and wished to explore diet restriction as well as strict adherence to fluid restriction outpatient.   We understand that comiting to an LVAD is a large step. We encourage you to have more discussions with your primary cardiologist regarding this in the future and you will need close follow up in the Cardiology clinic.     In addition, we recognize that having a chronic illness is difficult not only on your body but also your spirit and mind. We started you on a medication while you were inpatient to help with your mood. We encourage you to follow up with your primary physician regarding ways to cope with your illness moving forward.     Please be sure to weight yourself daily and if you notice an increase in weight by 2-3lbs in 1 day please be sure to call the core clinic.     It was a pleasure caring for you.     Best wishes moving forward.     Adult [facility] Follow-up and recommended labs and tests   Order Comments: 1) Follow up in Cardiology clinic within 2 weeks of discharge with labs including BMP and Magnesium     2) Primary care follow up within 2 weeks.     Appointments on [facility] and/or [facility] (with [facility] or [facility] provider or service). Call [phone] if you haven't heard regarding these appointments within 7 days of discharge.     Activity   Order Comments: Your activity upon discharge: activity as tolerated   Order Specific Question Answer Comments   Is discharge order? Yes      Full Code   Order Specific Question Answer Comments   Code status determined by: Discussion with patient/legal decision maker      Diet   Order Comments: Follow this diet upon discharge: Orders Placed This Encounter     Fluid restriction 2000 ML FLUID     Snacks/Supplements Adult: Other; If pt asks for a snack or supplement, please send;  With Meals     1 Gram Sodium Diet   Order Specific Question Answer Comments   Is discharge order? Yes             Discharge Disposition:   Home with close follow up          Condition on Discharge:   Discharge condition: Fair   Code status on discharge: Full Code      Date of service: 10/30/2018    The patient was discussed with Dr. Steward.    Bashir Cooper  (PCP) was contacted at the time of discharge, so as to bridge from hospital to outpatient care.   Primary Cardiologist,  and CORE clinic pool were contacted on discharge.     It was our pleasure to care for Danielito Chu during this hospitalization. Please do not hesitate to contact me should there be questions regarding the hospital course or discharge plan.

## 2018-10-29 NOTE — PROGRESS NOTES
Fabiano is already an established patient in the CORE/Heart Failure clinic.  [place] was with Rosmery Fuchs [place] [facility] [place].  I spoke with him/her at the bedside today about his/her discharge plans.  I have scheduled him a Return CORE/heart failure appt with Gema Chin NP on  at 130pm, with labs at 1pm.       He/she was instructed on the importance of daily weights, 2 gm sodium diet, 2L fluid restriction and compliance with medications upon hospital discharge.  I instructed him to call with any questions or concerns, including any weight gain or loss of 2 or more pounds in 24 hours or 5 or more pounds in 1 week. I will follow-up with Fabiano at the time of appt, sooner if needed.     Giselle Johnson RN BSN  Cardiology Care Coordinator - [facility]   Questions and [phone]

## 2018-10-29 NOTE — PLAN OF CARE
Problem: Patient Care Overview  Goal: Plan of Care/Patient Progress Review  Discharge Planner OT   Patient plan for discharge: Home   Current status: Facilitated LB proximal muscle exercises. Pt completed 10 reps on each leg of 5 standing proximal muscle exercises. VSS for duration of activity.   Barriers to return to prior living situation: Medical stability, deconditioning   Recommendations for discharge: Home with OP CR  Rationale for recommendations: Pt is near functional baseline in ADLs, pt would benefit from continued therapy to increase activity tolerance for ADLs.        Entered by: Corinne James 10/29/2018 3:18 PM

## 2018-10-30 ENCOUNTER — APPOINTMENT (OUTPATIENT)
Dept: OCCUPATIONAL THERAPY | Facility: CLINIC | Age: 63
DRG: 287 | End: 2018-10-30
Attending: INTERNAL MEDICINE
Payer: COMMERCIAL

## 2018-10-30 VITALS
RESPIRATION RATE: 18 BRPM | WEIGHT: 185 LBS | DIASTOLIC BLOOD PRESSURE: 85 MMHG | HEIGHT: 69 IN | OXYGEN SATURATION: 98 % | BODY MASS INDEX: 27.4 KG/M2 | TEMPERATURE: 97.5 F | SYSTOLIC BLOOD PRESSURE: 102 MMHG | HEART RATE: 93 BPM

## 2018-10-30 LAB
ANION GAP SERPL CALCULATED.3IONS-SCNC: 10 MMOL/L (ref 3–14)
BASOPHILS # BLD AUTO: 0 10E9/L (ref 0–0.2)
BASOPHILS NFR BLD AUTO: 0.5 %
BUN SERPL-MCNC: 25 MG/DL (ref 7–30)
CALCIUM SERPL-MCNC: 9.2 MG/DL (ref 8.5–10.1)
CHLORIDE SERPL-SCNC: 99 MMOL/L (ref 94–109)
CO2 SERPL-SCNC: 28 MMOL/L (ref 20–32)
CREAT SERPL-MCNC: 1.08 MG/DL (ref 0.66–1.25)
DIFFERENTIAL METHOD BLD: ABNORMAL
EOSINOPHIL # BLD AUTO: 0.2 10E9/L (ref 0–0.7)
EOSINOPHIL NFR BLD AUTO: 2 %
ERYTHROCYTE [DISTWIDTH] IN BLOOD BY AUTOMATED COUNT: 15.7 % (ref 10–15)
GFR SERPL CREATININE-BSD FRML MDRD: 69 ML/MIN/1.7M2
GLUCOSE SERPL-MCNC: 94 MG/DL (ref 70–99)
HCT VFR BLD AUTO: 55.3 % (ref 40–53)
HGB BLD-MCNC: 17.9 G/DL (ref 13.3–17.7)
IMM GRANULOCYTES # BLD: 0 10E9/L (ref 0–0.4)
IMM GRANULOCYTES NFR BLD: 0.4 %
LA PPP-IMP: NEGATIVE
LYMPHOCYTES # BLD AUTO: 2.2 10E9/L (ref 0.8–5.3)
LYMPHOCYTES NFR BLD AUTO: 28 %
MAGNESIUM SERPL-MCNC: 2 MG/DL (ref 1.6–2.3)
MCH RBC QN AUTO: 31.4 PG (ref 26.5–33)
MCHC RBC AUTO-ENTMCNC: 32.4 G/DL (ref 31.5–36.5)
MCV RBC AUTO: 97 FL (ref 78–100)
MONOCYTES # BLD AUTO: 1.1 10E9/L (ref 0–1.3)
MONOCYTES NFR BLD AUTO: 13.3 %
NEUTROPHILS # BLD AUTO: 4.4 10E9/L (ref 1.6–8.3)
NEUTROPHILS NFR BLD AUTO: 55.8 %
NRBC # BLD AUTO: 0 10*3/UL
NRBC BLD AUTO-RTO: 0 /100
PLATELET # BLD AUTO: 210 10E9/L (ref 150–450)
POTASSIUM SERPL-SCNC: 3.6 MMOL/L (ref 3.4–5.3)
RBC # BLD AUTO: 5.7 10E12/L (ref 4.4–5.9)
SODIUM SERPL-SCNC: 136 MMOL/L (ref 133–144)
WBC # BLD AUTO: 7.9 10E9/L (ref 4–11)

## 2018-10-30 PROCEDURE — 25000132 ZZH RX MED GY IP 250 OP 250 PS 637: Performed by: STUDENT IN AN ORGANIZED HEALTH CARE EDUCATION/TRAINING PROGRAM

## 2018-10-30 PROCEDURE — 83735 ASSAY OF MAGNESIUM: CPT | Performed by: STUDENT IN AN ORGANIZED HEALTH CARE EDUCATION/TRAINING PROGRAM

## 2018-10-30 PROCEDURE — 97110 THERAPEUTIC EXERCISES: CPT | Mod: GO

## 2018-10-30 PROCEDURE — 40000802 ZZH SITE CHECK

## 2018-10-30 PROCEDURE — 80048 BASIC METABOLIC PNL TOTAL CA: CPT | Performed by: STUDENT IN AN ORGANIZED HEALTH CARE EDUCATION/TRAINING PROGRAM

## 2018-10-30 PROCEDURE — 36415 COLL VENOUS BLD VENIPUNCTURE: CPT | Performed by: STUDENT IN AN ORGANIZED HEALTH CARE EDUCATION/TRAINING PROGRAM

## 2018-10-30 PROCEDURE — 85025 COMPLETE CBC W/AUTO DIFF WBC: CPT | Performed by: STUDENT IN AN ORGANIZED HEALTH CARE EDUCATION/TRAINING PROGRAM

## 2018-10-30 PROCEDURE — 40000133 ZZH STATISTIC OT WARD VISIT

## 2018-10-30 PROCEDURE — 99238 HOSP IP/OBS DSCHRG MGMT 30/<: CPT | Mod: GC | Performed by: INTERNAL MEDICINE

## 2018-10-30 RX ORDER — CAPTOPRIL 12.5 MG/1
6.25 TABLET ORAL 3 TIMES DAILY
Qty: 90 TABLET | Refills: 0 | Status: SHIPPED | OUTPATIENT
Start: 2018-10-30 | End: 2018-11-13

## 2018-10-30 RX ORDER — POTASSIUM CHLORIDE 1500 MG/1
20 TABLET, EXTENDED RELEASE ORAL DAILY
Qty: 90 TABLET | Refills: 1 | Status: ON HOLD | OUTPATIENT
Start: 2018-10-30 | End: 2018-11-21

## 2018-10-30 RX ADMIN — Medication 6.25 MG: at 14:13

## 2018-10-30 RX ADMIN — BUPROPION HYDROCHLORIDE 150 MG: 150 TABLET, FILM COATED, EXTENDED RELEASE ORAL at 08:51

## 2018-10-30 RX ADMIN — ASPIRIN 81 MG: 81 TABLET, COATED ORAL at 08:50

## 2018-10-30 RX ADMIN — POTASSIUM CHLORIDE 20 MEQ: 750 TABLET, EXTENDED RELEASE ORAL at 08:50

## 2018-10-30 RX ADMIN — TORSEMIDE 60 MG: 20 TABLET ORAL at 08:51

## 2018-10-30 RX ADMIN — DIGOXIN 250 MCG: 125 TABLET ORAL at 08:50

## 2018-10-30 RX ADMIN — Medication 6.25 MG: at 08:50

## 2018-10-30 RX ADMIN — RANITIDINE 150 MG: 150 TABLET, FILM COATED ORAL at 08:51

## 2018-10-30 RX ADMIN — Medication 100 MG: at 08:50

## 2018-10-30 RX ADMIN — Medication 10 ML: at 08:52

## 2018-10-30 ASSESSMENT — ACTIVITIES OF DAILY LIVING (ADL)
ADLS_ACUITY_SCORE: 10

## 2018-10-30 NOTE — PLAN OF CARE
"Problem: Patient Care Overview  Goal: Plan of Care/Patient Progress Review  Outcome: No Change  D: Pt admitted 10/19 with HF exacerbation for LVAD/txp workup.  I: Monitored vitals and assessed pt status. PRN trazodone. 2L FR.   A: A0x4. VSS on RA. SR on tele, HR  with freq PVCs up to 12/min. Denies pain, SOB. Voiding adequately. Up ind in room/SBA in [place]ixon due to feeling of \"drifting\" when walking, thought pt denies dizziness. Pt slept between cares, making needs known.  P: Continue to monitor and report changes/concerns to Cards 2.      "

## 2018-10-30 NOTE — PROGRESS NOTES
"SIX MINUTE WALK TEST  Cardiac Rehab  10/30/2018    Danielito Chu   MRN# [id number]  YOB: 1955 Age: 63 year old    Height: 5' 9\"  Weight (lbs): 185 lbs 0 oz Weight (kg): 83.9 kg (actual weight)    Supplemental oxygen during the test: No,      Oxygen Appliance: None    Oximetry: Finger Probe    Gait Aid: None     Pre-test Post-test   Blood Pressure (mm Hg) 100/76 109/80   Heart Rate (bpm) 87 99   OMNI Effort Scale 5 5   SpO2 (%) 98% 96%   Shortness of Breath Scale:   OMNI Effort Scale:  0 = Nothing     0 = Not tired at all  1 = Very light     1   2 = Light      2 = A little tired  3       3   4 = Moderate     4 = Getting more tire  5       5  6 = Somewhat hard    6 = Tired  7 =  Hard     7  8 =  Very hard     8 = Really tired  9 =  Very, very hard    9  10 =  Hardest possible    10 = Very, very tired    Total distance walked in 6 minutes: 1430 feet, 435.8 meters    Paused during test? No     Stopped test before 6 minutes? No    Did the patient experience any pain or discomfort during the test? No  Pain Ratin/10    Performing Staff: Soila Higginbotham  In Older Adults  Normative Data by Hilario et al, 2002  Age   Male    Female   60-69   1876 ft (572m)  1765 ft (538m)   70-79   1729 ft (527m)  1545 ft (471m)   80-89   1368 ft (417m)  1286 ft (392m)  Excellent test-retest reliability (ICC = 0.95)   According to Shane et al, 2006...  Standard Error of Measurement in elderly = 68.9 ft (21 m)  Minimal Clinically Important Difference = 164 ft (50 m)      "

## 2018-10-30 NOTE — PLAN OF CARE
"Problem: Patient Care Overview  Goal: Plan of Care/Patient Progress Review  Outcome: Adequate for Discharge Date Met: 10/30/18  D: HF exacerbation 10/19/2018, LVAD and Transplant workup     I: Monitored vitals and assessed pt status.   Changed: No Change  Running: PICC SL  PRN: K+ 3.6 - replaced     A: A0x4. VSS. Afebrile. Urinating adequately. Pt up Indpendently/SBA. Monitoring response to HF medications. Previously felt like he was \"drifting\" while ambulating, has gotten better today. Pt is to follow up out patient for further medication management if needed     P: Plan to discharge home today. Continue to monitor Pt status and report changes to treatment team. Cards 2     Temp:  [96  F (35.6  C)-98.2  F (36.8  C)] 96  F (35.6  C)  Heart Rate:  [77-92] 85  Resp:  [16-18] 16  BP: ()/(64-88) 96/71  SpO2:  [95 %-98 %] 96 %      "

## 2018-10-30 NOTE — PLAN OF CARE
Problem: Patient Care Overview  Goal: Plan of Care/Patient Progress Review  Discharge Planner OT   Patient plan for discharge: Home w/OP CR  Current status: Completed 6MWT. Educated on results. Reviewed discharge plans.   Barriers to return to prior living situation: Medical status.   Recommendations for discharge: Home with OP CR phase II  Rationale for recommendations: To maximize cardiopulmonary health       Entered by: Soila Higginbotham 10/30/2018 1:19 PM     OT/CR 6C    Occupational Therapy Discharge Summary    Reason for therapy discharge:    Discharged to home with outpatient therapy.    Progress towards therapy goal(s). See goals on Care Plan in [facility] electronic health record for goal details.  Goals partially met.  Barriers to achieving goals:   discharge from facility.    Therapy recommendation(s):    Continued therapy is recommended.  Rationale/Recommendations:  Continue OP CR Phase II for above reasons. .

## 2018-11-01 ENCOUNTER — DOCUMENTATION ONLY (OUTPATIENT)
Dept: CARDIOLOGY | Facility: CLINIC | Age: 63
End: 2018-11-01

## 2018-11-01 ENCOUNTER — CARE COORDINATION (OUTPATIENT)
Dept: CARDIOLOGY | Facility: CLINIC | Age: 63
End: 2018-11-01

## 2018-11-01 NOTE — PROGRESS NOTES
"Tell me how you have been doing since you were discharged from the hospital.  He reports he has been feeling good. He declines concerns at this time.      ACTIVITY  How is your activity tolerance?    He reports it is 'not too bad' but that he is having trouble finding somewhere to get his exercise since all locates are \"about 20 miles from\" him.   Did 6mwt. Went almost 1500 feet. Reports it felt good. Even ran a little bit.  Balance is a little off which they were aware of in the hospital. Made some medication changes and he is waiting to see if it will help him.      ASSISTANCE  Do you have someone at home to assist you with your daily activities?  Sister has been involved and lives about 40 miles away. Fabiano reports she is coming over todayto help him out.      MEDICATIONS  I would like to review your discharge medications and answer any questions you may have about them and also make sure you have all the medications that are new to you, and discuss any changes that were made to your pre-hospital medications.     Is someone helping you to set up your medications?    Doing it with his sister. She comes over and helps him set these up. He was able to tell me the new medications he started and reports no questions with these.     FOLLOW UP  Do you have a follow up appointment with your provider?   What other discharge instructions do you have?   Are you to get labs, procedures or tests before you see your provider?    You are scheduled to see Gema Chin NP on NOvember 13 for labs and CORE clinic at 1pm.     You are also scheduled for the following appointment with Dr Watkins on 12/13/18. Patient reports he will be going to [place] from November 27 until at least December 12. He reports that he would like to move this appointment up because if he is feeling well he reports he may want to stay in [place] for all of December so he can get in his exercise. I encouraged him that we would try to move up appointment " if able and that we would discuss his travel at his appointments.          CONTACT INFORMATION  Please feel free to call us with any other questions or symptoms that are concerning for you at [phone] option 1, option 3, if it is after 4:30 in the afternoon, or a weekend please call [phone] and ask for the on call specialist.  We want to do everything we can to help prevent you needing to return to the ED, so please do not hesitate to call us.       HEART FAILURE PATIENTS  Please weigh yourself daily and record your weights.  If you gain 2 pounds in 24 hours or 5 pounds in one week please call us at [phone]. He reports he has been steady at his new dry weight of 185lb. He continues to weigh himself daily.     DIET  It is recommended you follow a 1000 mg low sodium diet, avoid processed food, canned food and fast food restaurants.

## 2018-11-05 LAB
DLCOCOR-%PRED-PRE: 78 %
DLCOCOR-PRE: 21.65 ML/MIN/MMHG
DLCOUNC-%PRED-PRE: 83 %
DLCOUNC-PRE: 23.04 ML/MIN/MMHG
DLCOUNC-PRED: 27.45 ML/MIN/MMHG
ERV-%PRED-PRE: 72 %
ERV-PRE: 0.73 L
ERV-PRED: 1.02 L
EXPTIME-PRE: 9.54 SEC
FEF2575-%PRED-PRE: 76 %
FEF2575-PRE: 2.03 L/SEC
FEF2575-PRED: 2.64 L/SEC
FEFMAX-%PRED-PRE: 108 %
FEFMAX-PRE: 9.57 L/SEC
FEFMAX-PRED: 8.81 L/SEC
FEV1-%PRED-PRE: 78 %
FEV1-PRE: 2.47 L
FEV1FEV6-PRE: 79 %
FEV1FEV6-PRED: 79 %
FEV1FVC-PRE: 77 %
FEV1FVC-PRED: 75 %
FEV1SVC-PRE: 74 %
FEV1SVC-PRED: 66 %
FIFMAX-PRE: 6.7 L/SEC
FRCPLETH-%PRED-PRE: 79 %
FRCPLETH-PRE: 2.82 L
FRCPLETH-PRED: 3.57 L
FVC-%PRED-PRE: 79 %
FVC-PRE: 3.21 L
FVC-PRED: 4.04 L
IC-%PRED-PRE: 69 %
IC-PRE: 2.58 L
IC-PRED: 3.74 L
RVPLETH-%PRED-PRE: 85 %
RVPLETH-PRE: 2.09 L
RVPLETH-PRED: 2.44 L
TLCPLETH-%PRED-PRE: 78 %
TLCPLETH-PRE: 5.4 L
TLCPLETH-PRED: 6.92 L
VA-%PRED-PRE: 78 %
VA-PRE: 5.18 L
VC-%PRED-PRE: 69 %
VC-PRE: 3.32 L
VC-PRED: 4.76 L

## 2018-11-07 DIAGNOSIS — I50.21 ACUTE SYSTOLIC CONGESTIVE HEART FAILURE (H): Primary | ICD-10-CM

## 2018-11-10 ENCOUNTER — TELEPHONE (OUTPATIENT)
Dept: CARDIOLOGY | Facility: CLINIC | Age: 63
End: 2018-11-10

## 2018-11-10 DIAGNOSIS — I50.22 CHRONIC SYSTOLIC HEART FAILURE (H): ICD-10-CM

## 2018-11-10 DIAGNOSIS — I50.21 ACUTE SYSTOLIC CONGESTIVE HEART FAILURE (H): ICD-10-CM

## 2018-11-13 ENCOUNTER — HOSPITAL ENCOUNTER (INPATIENT)
Facility: CLINIC | Age: 63
LOS: 8 days | Discharge: HOME HEALTH | DRG: 291 | End: 2018-11-21
Attending: INTERNAL MEDICINE | Admitting: INTERNAL MEDICINE
Payer: COMMERCIAL

## 2018-11-13 ENCOUNTER — OFFICE VISIT (OUTPATIENT)
Dept: CARDIOLOGY | Facility: CLINIC | Age: 63
DRG: 291 | End: 2018-11-13
Attending: INTERNAL MEDICINE
Payer: COMMERCIAL

## 2018-11-13 ENCOUNTER — ALLIED HEALTH/NURSE VISIT (OUTPATIENT)
Dept: TRANSPLANT | Facility: CLINIC | Age: 63
DRG: 291 | End: 2018-11-13
Attending: INTERNAL MEDICINE
Payer: COMMERCIAL

## 2018-11-13 VITALS
OXYGEN SATURATION: 98 % | BODY MASS INDEX: 27.7 KG/M2 | HEART RATE: 100 BPM | HEIGHT: 69 IN | HEART RATE: 98 BPM | SYSTOLIC BLOOD PRESSURE: 102 MMHG | SYSTOLIC BLOOD PRESSURE: 102 MMHG | WEIGHT: 187 LBS | BODY MASS INDEX: 27.7 KG/M2 | WEIGHT: 187 LBS | DIASTOLIC BLOOD PRESSURE: 78 MMHG | DIASTOLIC BLOOD PRESSURE: 78 MMHG | OXYGEN SATURATION: 98 % | HEIGHT: 69 IN

## 2018-11-13 VITALS — WEIGHT: 189 LBS | BODY MASS INDEX: 27.91 KG/M2

## 2018-11-13 DIAGNOSIS — I50.21 ACUTE SYSTOLIC CONGESTIVE HEART FAILURE (H): ICD-10-CM

## 2018-11-13 DIAGNOSIS — I50.23 ACUTE ON CHRONIC SYSTOLIC CONGESTIVE HEART FAILURE (H): Primary | ICD-10-CM

## 2018-11-13 DIAGNOSIS — I50.23 ACUTE ON CHRONIC SYSTOLIC CONGESTIVE HEART FAILURE (H): ICD-10-CM

## 2018-11-13 DIAGNOSIS — K29.70 GASTRITIS WITHOUT BLEEDING, UNSPECIFIED CHRONICITY, UNSPECIFIED GASTRITIS TYPE: ICD-10-CM

## 2018-11-13 DIAGNOSIS — I50.22 CHRONIC SYSTOLIC HEART FAILURE (H): Primary | ICD-10-CM

## 2018-11-13 DIAGNOSIS — M10.9 ACUTE GOUTY ARTHRITIS: ICD-10-CM

## 2018-11-13 DIAGNOSIS — I42.8 NONISCHEMIC CARDIOMYOPATHY (H): Primary | ICD-10-CM

## 2018-11-13 LAB
ALBUMIN SERPL-MCNC: 4.1 G/DL (ref 3.4–5)
ALP SERPL-CCNC: 92 U/L (ref 40–150)
ALT SERPL W P-5'-P-CCNC: 17 U/L (ref 0–70)
ANION GAP SERPL CALCULATED.3IONS-SCNC: 7 MMOL/L (ref 3–14)
ANION GAP SERPL CALCULATED.3IONS-SCNC: 8 MMOL/L (ref 3–14)
AST SERPL W P-5'-P-CCNC: 17 U/L (ref 0–45)
BASOPHILS # BLD AUTO: 0 10E9/L (ref 0–0.2)
BASOPHILS NFR BLD AUTO: 0.3 %
BILIRUB SERPL-MCNC: 2 MG/DL (ref 0.2–1.3)
BUN SERPL-MCNC: 31 MG/DL (ref 7–30)
BUN SERPL-MCNC: 31 MG/DL (ref 7–30)
CALCIUM SERPL-MCNC: 9.4 MG/DL (ref 8.5–10.1)
CALCIUM SERPL-MCNC: 9.4 MG/DL (ref 8.5–10.1)
CHLORIDE SERPL-SCNC: 97 MMOL/L (ref 94–109)
CHLORIDE SERPL-SCNC: 97 MMOL/L (ref 94–109)
CO2 SERPL-SCNC: 28 MMOL/L (ref 20–32)
CO2 SERPL-SCNC: 28 MMOL/L (ref 20–32)
CREAT SERPL-MCNC: 1.53 MG/DL (ref 0.66–1.25)
CREAT SERPL-MCNC: 1.57 MG/DL (ref 0.66–1.25)
DIFFERENTIAL METHOD BLD: ABNORMAL
EOSINOPHIL # BLD AUTO: 0 10E9/L (ref 0–0.7)
EOSINOPHIL NFR BLD AUTO: 0.5 %
ERYTHROCYTE [DISTWIDTH] IN BLOOD BY AUTOMATED COUNT: 16.1 % (ref 10–15)
GFR SERPL CREATININE-BSD FRML MDRD: 45 ML/MIN/1.7M2
GFR SERPL CREATININE-BSD FRML MDRD: 46 ML/MIN/1.7M2
GLUCOSE SERPL-MCNC: 119 MG/DL (ref 70–99)
GLUCOSE SERPL-MCNC: 98 MG/DL (ref 70–99)
HCT VFR BLD AUTO: 52.6 % (ref 40–53)
HGB BLD-MCNC: 17.1 G/DL (ref 13.3–17.7)
IMM GRANULOCYTES # BLD: 0 10E9/L (ref 0–0.4)
IMM GRANULOCYTES NFR BLD: 0.3 %
LACTATE BLD-SCNC: 1.7 MMOL/L (ref 0.7–2)
LYMPHOCYTES # BLD AUTO: 1.5 10E9/L (ref 0.8–5.3)
LYMPHOCYTES NFR BLD AUTO: 22.5 %
MAGNESIUM SERPL-MCNC: 2.2 MG/DL (ref 1.6–2.3)
MAGNESIUM SERPL-MCNC: 2.3 MG/DL (ref 1.6–2.3)
MCH RBC QN AUTO: 31.5 PG (ref 26.5–33)
MCHC RBC AUTO-ENTMCNC: 32.5 G/DL (ref 31.5–36.5)
MCV RBC AUTO: 97 FL (ref 78–100)
MONOCYTES # BLD AUTO: 0.7 10E9/L (ref 0–1.3)
MONOCYTES NFR BLD AUTO: 11.2 %
NEUTROPHILS # BLD AUTO: 4.2 10E9/L (ref 1.6–8.3)
NEUTROPHILS NFR BLD AUTO: 65.2 %
NRBC # BLD AUTO: 0 10*3/UL
NRBC BLD AUTO-RTO: 0 /100
PLATELET # BLD AUTO: 165 10E9/L (ref 150–450)
POTASSIUM SERPL-SCNC: 4.1 MMOL/L (ref 3.4–5.3)
POTASSIUM SERPL-SCNC: 4.5 MMOL/L (ref 3.4–5.3)
PROT SERPL-MCNC: 8.4 G/DL (ref 6.8–8.8)
RBC # BLD AUTO: 5.43 10E12/L (ref 4.4–5.9)
SODIUM SERPL-SCNC: 132 MMOL/L (ref 133–144)
SODIUM SERPL-SCNC: 132 MMOL/L (ref 133–144)
URATE SERPL-MCNC: 12 MG/DL (ref 3.5–7.2)
WBC # BLD AUTO: 6.4 10E9/L (ref 4–11)

## 2018-11-13 PROCEDURE — 20000004 ZZH R&B ICU UMMC

## 2018-11-13 PROCEDURE — 93005 ELECTROCARDIOGRAM TRACING: CPT

## 2018-11-13 PROCEDURE — 36415 COLL VENOUS BLD VENIPUNCTURE: CPT | Performed by: INTERNAL MEDICINE

## 2018-11-13 PROCEDURE — 83735 ASSAY OF MAGNESIUM: CPT | Performed by: INTERNAL MEDICINE

## 2018-11-13 PROCEDURE — 99214 OFFICE O/P EST MOD 30 MIN: CPT | Mod: ZP | Performed by: NURSE PRACTITIONER

## 2018-11-13 PROCEDURE — 93010 ELECTROCARDIOGRAM REPORT: CPT | Performed by: INTERNAL MEDICINE

## 2018-11-13 PROCEDURE — 80048 BASIC METABOLIC PNL TOTAL CA: CPT | Performed by: NURSE PRACTITIONER

## 2018-11-13 PROCEDURE — 84550 ASSAY OF BLOOD/URIC ACID: CPT | Performed by: INTERNAL MEDICINE

## 2018-11-13 PROCEDURE — G0463 HOSPITAL OUTPT CLINIC VISIT: HCPCS | Mod: ZF

## 2018-11-13 PROCEDURE — 80053 COMPREHEN METABOLIC PANEL: CPT | Performed by: INTERNAL MEDICINE

## 2018-11-13 PROCEDURE — 36415 COLL VENOUS BLD VENIPUNCTURE: CPT | Performed by: NURSE PRACTITIONER

## 2018-11-13 PROCEDURE — 83605 ASSAY OF LACTIC ACID: CPT | Performed by: INTERNAL MEDICINE

## 2018-11-13 PROCEDURE — 99221 1ST HOSP IP/OBS SF/LOW 40: CPT | Mod: GC | Performed by: INTERNAL MEDICINE

## 2018-11-13 PROCEDURE — 85025 COMPLETE CBC W/AUTO DIFF WBC: CPT | Performed by: INTERNAL MEDICINE

## 2018-11-13 PROCEDURE — 40000141 ZZH STATISTIC PERIPHERAL IV START W/O US GUIDANCE

## 2018-11-13 PROCEDURE — 83735 ASSAY OF MAGNESIUM: CPT | Performed by: NURSE PRACTITIONER

## 2018-11-13 RX ORDER — ASPIRIN 81 MG/1
81 TABLET ORAL DAILY
Status: DISCONTINUED | OUTPATIENT
Start: 2018-11-14 | End: 2018-11-21 | Stop reason: HOSPADM

## 2018-11-13 RX ORDER — TORSEMIDE 20 MG/1
TABLET ORAL
Qty: 180 TABLET | Refills: 0 | Status: ON HOLD | OUTPATIENT
Start: 2018-11-13 | End: 2018-11-21

## 2018-11-13 RX ORDER — LISINOPRIL 5 MG/1
5 TABLET ORAL DAILY
Status: DISCONTINUED | OUTPATIENT
Start: 2018-11-14 | End: 2018-11-14

## 2018-11-13 RX ORDER — LISINOPRIL 5 MG/1
5 TABLET ORAL DAILY
Qty: 90 TABLET | Refills: 1 | Status: ON HOLD | OUTPATIENT
Start: 2018-11-13 | End: 2018-11-21

## 2018-11-13 RX ORDER — BUPROPION HYDROCHLORIDE 150 MG/1
150 TABLET ORAL DAILY
Status: DISCONTINUED | OUTPATIENT
Start: 2018-11-14 | End: 2018-11-21 | Stop reason: HOSPADM

## 2018-11-13 RX ORDER — DIGOXIN 125 MCG
250 TABLET ORAL DAILY
Status: DISCONTINUED | OUTPATIENT
Start: 2018-11-14 | End: 2018-11-14

## 2018-11-13 RX ORDER — LANOLIN ALCOHOL/MO/W.PET/CERES
100 CREAM (GRAM) TOPICAL DAILY
Status: DISCONTINUED | OUTPATIENT
Start: 2018-11-14 | End: 2018-11-21 | Stop reason: HOSPADM

## 2018-11-13 RX ORDER — FLUORIDE TOOTHPASTE
10 TOOTHPASTE DENTAL 4 TIMES DAILY PRN
Status: DISCONTINUED | OUTPATIENT
Start: 2018-11-13 | End: 2018-11-21 | Stop reason: HOSPADM

## 2018-11-13 RX ORDER — ATORVASTATIN CALCIUM 10 MG/1
10 TABLET, FILM COATED ORAL DAILY
Status: DISCONTINUED | OUTPATIENT
Start: 2018-11-14 | End: 2018-11-21 | Stop reason: HOSPADM

## 2018-11-13 RX ORDER — DIPHENHYDRAMINE HYDROCHLORIDE 25 MG/1
CAPSULE, LIQUID FILLED ORAL
COMMUNITY
Start: 2018-01-11

## 2018-11-13 ASSESSMENT — PAIN SCALES - GENERAL
PAINLEVEL: NO PAIN (0)
PAINLEVEL: NO PAIN (0)

## 2018-11-13 NOTE — LETTER
Transition Communication Hand-off for Care Transitions to Next Level of Care Provider    Name: Danielito Chu  : 1955  MRN #: [id number]  Primary Care Provider:  Dr. Bryan Orellana     Primary Clinic: [address] [address]     Reason for Hospitalization:  Decompensated systolic heart failure  CHF (congestive heart failure) (H)  Heart Failure   Varices screening  Admit Date/Time: 2018  8:16 PM  Discharge Date: 18  Payor Source: Payor: PREFERREDONE / Plan: [employer] HMO / Product Type: PPO   Readmission Assessment Measure (ANNABELLE) Risk Score/category: high  Reason for Communication Hand-off Referral: Multiple providers/specialties  Discharge Plan:   Concern for non-adherence with plan of care: no  Discharge Needs Assessment:  Needs       Most Recent Value    Anticipated Changes Related to Illness none    Equipment Currently Used at Home none    Transportation Available car, family or friend will provide    Home Infusion Provider [facility] [phone], Fax: [phone]      Follow-up specialty is recommended: Yes    Follow-up plan:  Future Appointments  Date Time Provider Department Center          2018 9:45 AM  LAB [facility]   2018 10:30 AM 1,  Cv Device [facility]   2018 11:00 AM Minoo Lopez NP [facility]   12/10/2018 3:30 PM  LAB [facility]   12/10/2018 4:30 PM Candido Mendez MD [facility]   1/3/2019 9:00 AM  LAB [facility]   1/3/2019 9:30 AM Lorena Watkins MD [facility]   2019 1:00 PM 1,  Cv Device [facility]       Any outstanding tests or procedures:        Referrals     Future Labs/Procedures    Home infusion referral     Comments:    Your provider has referred you to:  [facility]   Phone: [phone]   Fax: [phone]    For Milrinone drip and PICC line care and teaching.     Anticipated Length of Therapy: TBD    Home Infusion Pharmacist to adjust therapy  based on labs and clinical assessments: No (Home Infusion will call for order)    Labs:  May draw labs from Venous Catheter: No  Home Infusion Pharmacist to order labs based on therapy type and clinical assessments: No   Call/Fax Lab Results to: Dr. Lorena Watkins    Agency Staff to assess nursing needs for Infusion Therapy.    Access Device Management:  IV Access Type: PICC  Routine site care (per agency protocol) to maintain access device? Yes    Medication Therapy Management Referral     Comments:    MTM referral reason            Patient has 5 PTA or Discharge Medications AND one of the following   diagnoses: DM,HF,COPD,AMI DX,PULM HTN       This service is designed to help you get the most from your medications.  A specially trained pharmacist will work closely with you and your doctors  to solve any problems related to your medications and to help you get the   best results from taking them.      The Medication Therapy Management staff will call you to schedule an appointment.            Key Recommendations:  Post hospitalization follow up appointment on 11/23/18 at 12:45 PM.     GILBERT SOTO RN CC

## 2018-11-13 NOTE — NURSING NOTE
Chief Complaint   Patient presents with     New Patient     VAD eval      Vitals were taken and medications were reconciled.    Mary Her CMA     2:25 PM

## 2018-11-13 NOTE — LETTER
11/13/2018      RE: Danielito Chu  [address] [place] [address]       Dear Colleague,    Thank you for the opportunity to participate in the care of your patient, Danielito Chu, at the [facility] HEART [facility]. Please see a copy of my visit note below.    Advanced Heart Failure Clinic -- CORE Follow Up  November 13, 2018    HPI:   Mr. Danielito Chu is a 63yr old male with a history of NICM (LVEF at dong of 15%) s/p ICD (4/2017), HTN, DMII, SHIRLEY, and HL who presents to CORE clinic for follow up of recent hospitalization.    He presented to [facility] 10/19 with shortness of breath.  RHC 10/26 showed RA 7, PCW 21, PVR 3/4, SVR 1950, CI 1.6.  He was diuresed, and his medical therapy was optimized while he underwent an expedited LVAD/transplant evaluation.  He ultimately was not ready for an LVAD during this admission, so he was discharged 10/30 with plans for close clinic follow up.    Since discharge, he states that he feels worse now than when he was admitted to the hospital.  He thinks that he is having a reaction to his new medications, as he feels like his balance is off, his ears feel plugged, and he has been shaky.  He notes that his symptoms started when his medications were changed in the hospital, and thinks that captopril is the culprit.  He would like to stop captopril and go back to lisinopril, which he has tolerated in the past.  He cannot tell if he is retaining water, but states that his weight has remained stable at 187#.  He feels dehydrated.  He has been strictly monitoring his salt and fluid intake.  He has not been eating much, nor is he eating regularly.  He has vomited the last 3 days.  His energy levels are down, and he has not been active.  His legs feel weak.  His breathing status feels normal, and he does not note worsening shortness of breath, PND, nor orthopnea.  He stopped taking wellbutrin 3d ago.  He does not feel depressed, and  his sister states that he has been engaging more.  He notes that he and his sister misread their discharge instructions, so he was only taking torsemide 40mg twice daily.  He noticed the discrepancy last week, so has been taking 60mg twice daily for the last couple days.  He is planning to go to [place] , and anticipates returning .  He otherwise denies chest pain, palpitations, persistent dizziness, headaches, fevers, and chills.    PAST MEDICAL HISTORY:  Past Medical History:   Diagnosis Date     Acute systolic congestive heart failure (H) 2017     Diabetes (H)      HTN (hypertension)      Hyperlipidemia      Mitral regurgitation      Nocturnal oxygen desaturation 3/29/2018     Nonischemic cardiomyopathy (H) 2017     SHIRLEY (obstructive sleep apnea)      Pacemaker 2017    ICD 17       FAMILY HISTORY:  Family History   Problem Relation Age of Onset     Heart Failure Father       at age 86     Heart Failure Paternal Uncle       at 66      Myocardial Infarction Paternal Uncle       at age 62     Coronary Artery Disease Mother       during CABG at age 41       SOCIAL HISTORY:  Social History     Social History     Marital status: Single     Spouse name: N/A     Number of children: N/A     Years of education: N/A     Social History Main Topics     Smoking status: Former Smoker     Smokeless tobacco: Never Used      Comment: quit 3/2017     Alcohol use Not on file      Comment: social     Drug use: Not on file     Sexual activity: Not on file     Other Topics Concern     Not on file     Social History Narrative       CURRENT MEDICATIONS:  Current Outpatient Prescriptions   Medication Sig Dispense Refill     artificial saliva (BIOTENE DRY MOUTHWASH) LIQD liquid Swish and spit 10 mLs in mouth 4 times daily as needed for dry mouth 59 mL 0     aspirin EC 81 MG EC tablet Take 1 tablet (81 mg) by mouth daily 30 tablet 3     atorvastatin (LIPITOR) 10 MG tablet Take 1 tablet (10 mg) by  "mouth daily 90 tablet 3     buPROPion (WELLBUTRIN XL) 150 MG 24 hr tablet Take 1 tablet (150 mg) by mouth daily 60 tablet 0     captopril (CAPOTEN) 12.5 MG tablet Take 0.5 tablets (6.25 mg) by mouth 3 times daily 90 tablet 0     digoxin (LANOXIN) 250 MCG tablet TAKE 1 TABLET (250 MCG) BY MOUTH DAILY 90 tablet 3     metFORMIN (GLUCOPHAGE) 500 MG tablet Take 1 tablet (500 mg) by mouth 2 times daily (with meals)       Potassium Chloride ER 20 MEQ TBCR Take 1 tablet (20 mEq) by mouth daily 90 tablet 1     sildenafil (VIAGRA) 50 MG tablet Take 1 tablet (50 mg) by mouth daily as needed (Patient not taking: Reported on 10/16/2018) 20 tablet 1     thiamine 100 MG tablet Take 1 tablet (100 mg) by mouth daily 60 tablet 0     torsemide (DEMADEX) 20 MG tablet Take 3 tablets (60 mg) by mouth 2 times daily 180 tablet 0     traZODone (DESYREL) 100 MG tablet TAKE 1 TABLET BY MOUTH AT BEDTIME AS NEEDED FOR SLEEP 30 tablet 1       ROS:   Constitutional: No fever, chills, or sweats. Stable weight.   ENT: As per HPI.  Allergies/Immunologic: Negative.   Respiratory: No cough, hemoptysis.   Cardiovascular: As per HPI.   GI: As per HPI.  : No urinary frequency, dysuria, or hematuria.   Integument: Negative.   Psychiatric: As per HPI.  Neuro: Negative.   Endocrinology: Negative.   Musculoskeletal: As per HPI.    EXAM:  /78 (BP Location: Left arm, Patient Position: Chair, Cuff Size: Adult Regular)  Pulse 98  Ht 1.753 m (5' 9\")  Wt 84.8 kg (187 lb)  SpO2 98%  BMI 27.62 kg/m2  General: appears comfortable, alert and articulate  Head: normocephalic, atraumatic  Eyes: anicteric sclera, EOMI  Neck: no adenopathy  Orophyarynx: moist mucosa, no lesions, dentition intact  Heart: regular, S1/S2, soft JENNY at LLSB, JVD at mid neck when sitting upright  Lungs: clear, no rales or wheezing  Abdomen: soft, non-tender, bowel sounds present, no hepatosplenomegaly  Extremities: no clubbing, cyanosis or LE edema  Neurological: normal speech and " affect, no gross motor deficits    Labs:  CBC RESULTS:  Lab Results   Component Value Date    WBC 7.9 10/30/2018    RBC 5.70 10/30/2018    HGB 17.9 (H) 10/30/2018    HCT 55.3 (H) 10/30/2018    MCV 97 10/30/2018    MCH 31.4 10/30/2018    MCHC 32.4 10/30/2018    RDW 15.7 (H) 10/30/2018     10/30/2018       CMP RESULTS:  Lab Results   Component Value Date     (L) 11/13/2018    POTASSIUM 4.5 11/13/2018    CHLORIDE 97 11/13/2018    CO2 28 11/13/2018    ANIONGAP 7 11/13/2018    GLC 98 11/13/2018    BUN 31 (H) 11/13/2018    CR 1.57 (H) 11/13/2018    GFRESTIMATED 45 (L) 11/13/2018    GFRESTBLACK 54 (L) 11/13/2018    ASHLEE 9.4 11/13/2018    BILITOTAL 2.3 (H) 10/24/2018    ALBUMIN 3.6 10/24/2018    ALKPHOS 77 10/24/2018    ALT 14 10/24/2018    AST 30 10/24/2018        INR RESULTS:  Lab Results   Component Value Date    INR 1.31 (H) 10/24/2018       Lab Results   Component Value Date    MAG 2.3 11/13/2018     Lab Results   Component Value Date    NTBNPI 1970 (H) 10/27/2018     Lab Results   Component Value Date    NTBNP 1622 (H) 10/17/2018       Assessment and Plan:   Mr. Danielito Chu is a 63yr old male with a history of NICM (LVEF at dong of 15%) s/p ICD (4/2017), HTN, DMII, SHIRLEY, and HL who presents to [facility] for follow up of recent hospitalization.    Labs today show creatinine 1.57 and BUN 31.  He appears slightly hypervolemic/euvolemic.  Advised that he decrease torsemide slightly, to 60mg in the am and 40mg in the pm.  Also, given that he has noted new symptoms since his meds were changed, advised that he discontinue captopril and start lisinopril 5mg daily.  Will plan to repeat a BMP within the next 1-2 weeks, prior to him leaving for [place].  Asked that he return to [facility] prior to leaving on 11/26 for further evaluation, and to call with any change or persisting symptoms.    Overall, I am concerned about his worsening symptoms and functional decline.  He expressed that it is very important for  him to go to [place] to spend some time with his significant other.  He is scheduled to follow up with CVTS today as part of his LVAD/tx evaluation.  He again states that he does not feel ready for advanced therapies, but wants to feel better.  He thinks that if he can get back on entresto, then he will feel better.  Note that he needs a dental exam.    Chronic systolic heart failure secondary to NICM  Stage C  NYHA Class IIIB  ACEi/ARB:  Yes, discontinue captopril and start lisinopril 5mg daily  BB:  Held due to cardiogenic shock  Aldosterone antagonist:  contraindicated due to hyperkalemia and renal function  SCD prophylaxis:  ICD  Fluid status:  Euvolemic/slightly hypervolemic, decreasing torsemide slightly to 60mg in the am and 40mg in the pm  Sleep evaluation:  Concern for SHIRLEY while inpatient.  Overnight oximetery showed significant desaturations to mid 80s.  Advised to follow up with sleep medicine as outpatient, but he states intolerance to CPAP masks.  Follow Up:  11/26 with labs prior        The above was reviewed with Mr. Chu and his sister, who verbalized understanding and will call with further questions/concerns.    30 minutes spent face-to-face with patient, with >50% in counseling and/or coordination of care as described above.      Gema Chin, TAMANNA, APRN, FNP-BC  Advanced Heart Failure Nurse Practitioner  [facility]  Patient Care Team:  Bashir Hines as PCP - General (Family Practice)  Lorena Watkins MD as MD (Cardiology)  Spakrle Joel, RN as Nurse Coordinator (Cardiology)  Cecelia Palacios, RN as Nurse Coordinator (Cardiology)  Deysi Mattson APRN CNP as Nurse Practitioner (Cardiology)  Kalli Purcell APRN CNP as Nurse Practitioner (Cardiology)  Jermain Hobbs, RN as Nurse Coordinator (Cardiology)  Giselle Johnson, RN as Nurse Coordinator (Cardiology)  SELF, REFERRED

## 2018-11-13 NOTE — NURSING NOTE
Diet: Patient instructed regarding a heart failure healthy diet, including discussion of reduced fat and 2000 mg daily sodium restriction, daily weights, medication purpose and compliance, fluid restrictions and resources for patient and family to access for assistance with heart failure management.       Labs: Patient was given results of the laboratory testing obtained today and patient was instructed about when to return for the next laboratory testing. Labs with [facility] in 2 weeks    Med Reconcile: Reviewed and verified all current medications with the patient. The updated medication list was printed and given to the patient. Stop captopril. Start lisinopril 5mg    Return Appointment: Patient given instructions regarding scheduling next clinic visit. CORE, labs, device 11/26    Patient stated she understood all health information given and agreed to call with further questions or concerns.       Giselle Johnson RN

## 2018-11-13 NOTE — MR AVS SNAPSHOT
After Visit Summary   11/13/2018    Juan Shen    MRN: [id number]           Patient Information     Date Of Birth          1955        Visit Information        Provider Department      11/13/2018 2:30 PM Gema Chin NP [facility]        Today's Diagnoses     Chronic systolic heart failure (H)    -  1    Acute on chronic systolic congestive heart failure (H)          Care Instructions    You were seen today in the Cardiovascular Clinic at the [facility].     Cardiology Providers you saw during your visit: Gema Chin NP     Follow up and medication changes:  1. Discontinue captopril.  2. Start lisinopril 5mg daily  3. Decrease torsemide to 60mg in the morning and 40mg in the evening  4. Repeat labs in 2 weeks with appointments on 11/26    Results for JUAN SHEN (MRN [id number]) as of 11/13/2018 14:18   Ref. Range 11/13/2018 10:46   Sodium Latest Ref Range: 133 - 144 mmol/L 132 (L)   Potassium Latest Ref Range: 3.4 - 5.3 mmol/L 4.5   Chloride Latest Ref Range: 94 - 109 mmol/L 97   Carbon Dioxide Latest Ref Range: 20 - 32 mmol/L 28   Urea Nitrogen Latest Ref Range: 7 - 30 mg/dL 31 (H)   Creatinine Latest Ref Range: 0.66 - 1.25 mg/dL 1.57 (H)   GFR Estimate Latest Ref Range: >60 mL/min/1.7m2 45 (L)   GFR Estimate If Black Latest Ref Range: >60 mL/min/1.7m2 54 (L)   Calcium Latest Ref Range: 8.5 - 10.1 mg/dL 9.4   Anion Gap Latest Ref Range: 3 - 14 mmol/L 7   Magnesium Latest Ref Range: 1.6 - 2.3 mg/dL 2.3   Glucose Latest Ref Range: 70 - 99 mg/dL 98           Please limit your fluid intake to 2 L (68 ounces) daily.  2 Liters a day = 8.5 cups, or 68 ounces.  Please limit your salt intake to 2 grams a day or less.     If you gain 2# in 24 hours or 5# in one week call Giselle Johnson RN so we can adjust your medications as needed over the phone.     Please feel free to call me with any questions or concerns.       Giselle Johnson RN  [facility]  "[facility]  Cardiology Care Coordinator-Heart Failure Clinic     Questions and [phone].   First press #1 for the University and then press #3 for \"Medical Questions\" to reach us Cardiology Nurses.      On Call Cardiologist for after hours or on weekends: [phone]   option #4 and ask to speak to the on-call Cardiologist. Inform them you are a CORE/heart failure patient at the [place].        If you need a medication refill please contact your pharmacy.  Please allow 3 business days for your refill to be completed.  _______________________________________________________  C.O.R.E. CLINIC Cardiomyopathy, Optimization, Rehabilitation, Education   The C.O.R.E. CLINIC is a heart failure specialty clinic within the [facility] where you will work with specialized nurse practitioners dedicated to helping patients with heart failure carefully adjust medications, receive education, and learn who and when to call if symptoms develop. They specialize in helping you better understand your condition, slow the progression of your disease, improve the length and quality of your life, help you detect future heart problems before they become life threatening, and avoid hospitalizations.  As always, thank you for trusting us with your health care needs!          Follow-ups after your visit        Your next 10 appointments already scheduled     2018  4:00 PM CST   (Arrive by 3:45 PM)   New Patient Visit with Andrei Silva MD   [facility] ([facility])    [address] [place] [address]  [address] [place] [id number] [phone]            Nov 15, 2018 11:00 AM CST   [facility]INE with [facility] ([facility])    [address] [place] [address]  3rd Floor  [facility] [address] [phone]           Note: this is not an onsite visit; there is no need to come " to the facility.            Nov 26, 2018  9:45 AM CST   Lab with UC LAB   [facility] ([facility])    [address] [place] [address]  1st Floor  [facility] [address] [phone]            Nov 26, 2018 10:30 AM CST   Implanted Defibulator with Uc Cv Device 1   [facility] ([facility])    [address] [place] [address] Floor  [phone]               Nov 26, 2018 11:00 AM CST   (Arrive by 10:45 AM)   CORE RETURN with Minoo Lopez NP   [facility] ([facility])    [address] [place] [address]  [address] [place] [id number] [phone]            Jan 03, 2019  9:00 AM CST   Lab with UC LAB   [facility] ([facility])    [address] [place] [id number]   [phone]            Jan 03, 2019  9:30 AM CST   (Arrive by 9:15 AM)   RETURN HEART FAILURE with Lorena Watkins MD   [facility] ([facility])    [address] [place] [address]  [address] [place] [id number] [phone]            Jan 21, 2019  1:00 PM CST   (Arrive by 12:45 PM)   Implanted Defibulator with Uc Cv Device 1   [facility] ([facility])    [address] [place] [address]  [id number]                 Who to contact     If you have questions or need follow up information about today's clinic visit or your schedule please contact [facility] directly at [phone].  Normal or non-critical lab and imaging results will be communicated to you by MyChart, letter or phone within 4 business days after the clinic has received the results. If you do not hear from us within 7 days, please contact the clinic through MyChart or phone. If you have a critical or abnormal lab result, we will notify you by phone as soon as possible.  Submit refill requests through [employer] or call your pharmacy and they will forward the refill request to us. Please  "allow 3 business days for your refill to be completed.          Additional Information About Your Visit        Care EveryWhere ID     This is your Care EveryWhere ID. This could be used by other organizations to access your [facility] medical records  [id number]        Your Vitals Were     Pulse Height Pulse Oximetry BMI (Body Mass Index)          98 1.753 m (5' 9\") 98% 27.62 kg/m2         Blood Pressure from Last 3 Encounters:   11/13/18 102/78   10/30/18 102/85   10/17/18 110/71    Weight from Last 3 Encounters:   11/13/18 84.8 kg (187 lb)   11/13/18 85.7 kg (189 lb)   10/30/18 83.9 kg (185 lb)              We Performed the Following     Basic metabolic panel          Today's Medication Changes          These changes are accurate as of 11/13/18  3:26 PM.  If you have any questions, ask your nurse or doctor.               Start taking these medicines.        Dose/Directions    lisinopril 5 MG tablet   Commonly known as:  PRINIVIL/ZESTRIL   Used for:  Chronic systolic heart failure (H)   Started by:  Gema Chin NP        Dose:  5 mg   Take 1 tablet (5 mg) by mouth daily   Quantity:  90 tablet   Refills:  1         These medicines have changed or have updated prescriptions.        Dose/Directions    torsemide 20 MG tablet   Commonly known as:  DEMADEX   This may have changed:    - how much to take  - how to take this  - when to take this  - additional instructions   Used for:  Acute on chronic systolic congestive heart failure (H)   Changed by:  Gema Chin NP        Take 60mg in the am and 40mg in the pm   Quantity:  180 tablet   Refills:  0         Stop taking these medicines if you haven't already. Please contact your care team if you have questions.     captopril 12.5 MG tablet   Commonly known as:  CAPOTEN   Stopped by:  Gema Chin NP                Where to get your medicines      These medications were sent to [facility]/pharmacy #[id number] - [address] " [facility] [address]     Phone:  [phone]     lisinopril 5 MG tablet    torsemide 20 MG tablet                Primary Care Provider Office Phone # Fax #    Bashir Hines [phone]       [facility] [address] [facility] [address]        Equal Access to Services     ALEX RODGERS : Hadii aad ku hadasho Soomaali, waaxda luqadaha, qaybta kaalmada adeegyada, waxay cesarin hayaan adeayden reeserupagold pearson. So [facility] [phone].    ATENCIÓN: Si habla español, tiene a stokes disposición servicios gratuitos de asistencia lingüística. Kam al [phone].    We comply with applicable federal civil rights laws and [place] laws. We do not discriminate on the basis of race, color, national origin, age, disability, sex, sexual orientation, or gender identity.            Thank you!     Thank you for choosing [facility]  for your care. Our goal is always to provide you with excellent care. Hearing back from our patients is one way we can continue to improve our services. Please take a few minutes to complete the written survey that you may receive in the mail after your visit with us. Thank you!             Your Updated Medication List - Protect others around you: Learn how to safely use, store and throw away your medicines at www.disposemymeds.org.          This list is accurate as of 11/13/18  3:26 PM.  Always use your most recent med list.                   Brand Name Dispense Instructions for use Diagnosis    artificial saliva Liqd liquid     59 mL    Swish and spit 10 mLs in mouth 4 times daily as needed for dry mouth    Acute on chronic systolic congestive heart failure (H)       aspirin 81 MG EC tablet     30 tablet    Take 1 tablet (81 mg) by mouth daily    Acute systolic congestive heart failure (H)       atorvastatin 10 MG tablet    LIPITOR    90 tablet    Take 1 tablet (10 mg) by mouth daily    Acute systolic congestive heart failure (H)       Blood Glucose Monitor System  w/Device Kit      three times a week        buPROPion 150 MG 24 hr tablet    WELLBUTRIN XL    60 tablet    Take 1 tablet (150 mg) by mouth daily    Depression, unspecified depression type       digoxin 250 MCG tablet    LANOXIN    90 tablet    TAKE 1 TABLET (250 MCG) BY MOUTH DAILY    Acute on chronic systolic heart failure (H)       lisinopril 5 MG tablet    PRINIVIL/ZESTRIL    90 tablet    Take 1 tablet (5 mg) by mouth daily    Chronic systolic heart failure (H)       metFORMIN 500 MG tablet    GLUCOPHAGE     Take 1 tablet (500 mg) by mouth 2 times daily (with meals)    Chronic systolic heart failure (H), Acute systolic congestive heart failure (H)       Potassium Chloride ER 20 MEQ Tbcr     90 tablet    Take 1 tablet (20 mEq) by mouth daily    Acute on chronic systolic congestive heart failure (H)       sildenafil 50 MG tablet    VIAGRA    20 tablet    Take 1 tablet (50 mg) by mouth daily as needed    Drug-induced impotence       thiamine 100 MG tablet     60 tablet    Take 1 tablet (100 mg) by mouth daily    Acute on chronic systolic heart failure (H)       torsemide 20 MG tablet    DEMADEX    180 tablet    Take 60mg in the am and 40mg in the pm    Acute on chronic systolic congestive heart failure (H)       traZODone 100 MG tablet    DESYREL    30 tablet    TAKE 1 TABLET BY MOUTH AT BEDTIME AS NEEDED FOR SLEEP    Insomnia, unspecified type

## 2018-11-13 NOTE — PROGRESS NOTES
Outpatient MNT: VAD Evaluation    Time Spent: 15 minutes  Visit Type: Initial  Referring Physician: Dr Watkins   Pt accompanied by: self    History of previous txp: none    Nutrition Assessment  Pt or his sister cook at home. They do not add salt. He reports overall 'bad taste' in mouth, which somewhat resembled acid and/or oil. He reports some desire to lose weight.     Appetite: reduced d/t bad taste in mouth; this is not a new issue     Vitamins, Supplements, Pertinent Meds: thiamin   Herbal Medicines/Supplements: none     Diet Recall  Breakfast Banana, apple, cereal    Lunch None    Dinner Pork, chicken, steak with some rice/potatoes (not nightly) and either green beans or corn every night    Snacks Watermelon    Beverages 2 L fluid restriction; water, 16 oz OJ most days    Alcohol None    Dining out Tries to avoid      Physical Activity  None lately d/t weather  Did purchase a treadmill recently and hopes to receive it in the next few weeks      Anthropometrics  Height:   69 in   BMI:    27.9    Weight Status:Overweight BMI 25-29.9   Weight:  189 lbs            IBW (lb): 160  % IBW: 118    Wt Hx: Pt reports no edema. He reports being down from his prior weight of 202 lbs x 1 month. Question if this weight loss is combination of dry and fluid losses with recent hospitalization.     Adj/dosing BW: 189 lbs/86 kg       Labs  Recent Labs   Lab Test  10/24/18   0634  02/22/18   1410   CHOL  67  95   HDL  34*  23*   LDL  17  22   TRIG  80  251*       Malnutrition  % Intake: No decreased intake noted  % Weight Loss: difficult to determine with fluid vs dry weight losses   Subcutaneous Fat Loss: Mild   Muscle Loss: None  Fluid Accumulation/Edema: None noted  Malnutrition Diagnosis: Patient does not meet two of the above criteria necessary for diagnosing malnutrition     Estimated Nutrition Needs  Energy  9108-6249     (20-25 kcal/kg dosing BW per pt desired wt loss)     Protein  69-86    (0.8-1 g/kg for  maintenance)           Fluid  Pending MD   Micronutrient   Na+: <2000 mg/day              Nutrition Diagnosis  Food and nutrition related knowledge deficit r/t pre LVAD eval AEB pt verbalized not hearing pre/post op diet guidelines.    Nutrition Intervention  Nutrition education provided:  Discussed sodium intake (low sodium foods and drinks, seasoning food without salt and tips for low sodium diet). Reviewed some recommendations for pt desire for weight loss: continue with focus on protein, including a small protein-rich snack between breakfast and dinner. Emailed pt list of lower sodium snack ideas (mozzarella string cheese, apple or banana with peanut butter, protein bar, boiled eggs, etc).     Reviewed post txp diet guidelines in brief (will review in further detail post txp):  (1) Review of proper food safety measures d/t immunosuppressant therapy post-op and increased risk for food-borne illness    (2) Avoid the following post txp d/t risk for rejection, unknown effects on the organs, and/or potential interactions with immunosuppressants:  - Herbal, Chinese, holistic, chiropractic, natural, alternative medicines and supplements  - Detoxes and cleanses  - Weight loss pills  - Protein powders or other products with extracts or herbs (ie green tea extract)    (3) Med regimen and possible side effects    Reviewed post VAD nutrition:  High likelihood of early satiety postop with placement of VAD, requiring small/frequent meals. Discussed need for high protein intake for healing and how adequate protein intake may be impacted by early satiety.     Patient Understanding: Pt verbalized understanding of education provided.  Expected Compliance: Good  Follow-Up Plans: PRN     Nutrition Goals  1. Limit Na+ <2000mg/day  2. Pt to verbalize understanding of 3 aspects of post txp education provided     Provided pt with contact info.   Hillary Hannon RD, LD  Pgr [phone]

## 2018-11-13 NOTE — PROGRESS NOTES
Advanced Heart Failure Clinic -- CORE Follow Up  November 13, 2018    HPI:   Mr. Danielito Chu is a 63yr old male with a history of NICM (LVEF at dong of 15%) s/p ICD (4/2017), HTN, DMII, SHIRLEY, and HL who presents to CORE clinic for follow up of recent hospitalization.    He presented to [facility] 10/19 with shortness of breath.  RHC 10/26 showed RA 7, PCW 21, PVR 3/4, SVR 1950, CI 1.6.  He was diuresed, and his medical therapy was optimized while he underwent an expedited LVAD/transplant evaluation.  He ultimately was not ready for an LVAD during this admission, so he was discharged 10/30 with plans for close clinic follow up.    Since discharge, he states that he feels worse now than when he was admitted to the hospital.  He thinks that he is having a reaction to his new medications, as he feels like his balance is off, his ears feel plugged, and he has been shaky.  He notes that his symptoms started when his medications were changed in the hospital, and thinks that captopril is the culprit.  He would like to stop captopril and go back to lisinopril, which he has tolerated in the past.  He cannot tell if he is retaining water, but states that his weight has remained stable at 187#.  He feels dehydrated.  He has been strictly monitoring his salt and fluid intake.  He has not been eating much, nor is he eating regularly.  He has vomited the last 3 days.  His energy levels are down, and he has not been active.  His legs feel weak.  His breathing status feels normal, and he does not note worsening shortness of breath, PND, nor orthopnea.  He stopped taking wellbutrin 3d ago.  He does not feel depressed, and his sister states that he has been engaging more.  He notes that he and his sister misread their discharge instructions, so he was only taking torsemide 40mg twice daily.  He noticed the discrepancy last week, so has been taking 60mg twice daily for the last couple days.  He is planning to go to [place] 11/27, and  anticipates returning .  He otherwise denies chest pain, palpitations, persistent dizziness, headaches, fevers, and chills.    PAST MEDICAL HISTORY:  Past Medical History:   Diagnosis Date     Acute systolic congestive heart failure (H) 2017     Diabetes (H)      HTN (hypertension)      Hyperlipidemia      Mitral regurgitation      Nocturnal oxygen desaturation 3/29/2018     Nonischemic cardiomyopathy (H) 2017     SHIRLEY (obstructive sleep apnea)      Pacemaker 2017    ICD 17       FAMILY HISTORY:  Family History   Problem Relation Age of Onset     Heart Failure Father       at age 86     Heart Failure Paternal Uncle       at 66      Myocardial Infarction Paternal Uncle       at age 62     Coronary Artery Disease Mother       during CABG at age 41       SOCIAL HISTORY:  Social History     Social History     Marital status: Single     Spouse name: N/A     Number of children: N/A     Years of education: N/A     Social History Main Topics     Smoking status: Former Smoker     Smokeless tobacco: Never Used      Comment: quit 3/2017     Alcohol use Not on file      Comment: social     Drug use: Not on file     Sexual activity: Not on file     Other Topics Concern     Not on file     Social History Narrative       CURRENT MEDICATIONS:  Current Outpatient Prescriptions   Medication Sig Dispense Refill     artificial saliva (BIOTENE DRY MOUTHWASH) LIQD liquid Swish and spit 10 mLs in mouth 4 times daily as needed for dry mouth 59 mL 0     aspirin EC 81 MG EC tablet Take 1 tablet (81 mg) by mouth daily 30 tablet 3     atorvastatin (LIPITOR) 10 MG tablet Take 1 tablet (10 mg) by mouth daily 90 tablet 3     buPROPion (WELLBUTRIN XL) 150 MG 24 hr tablet Take 1 tablet (150 mg) by mouth daily 60 tablet 0     captopril (CAPOTEN) 12.5 MG tablet Take 0.5 tablets (6.25 mg) by mouth 3 times daily 90 tablet 0     digoxin (LANOXIN) 250 MCG tablet TAKE 1 TABLET (250 MCG) BY MOUTH DAILY 90 tablet 3      "metFORMIN (GLUCOPHAGE) 500 MG tablet Take 1 tablet (500 mg) by mouth 2 times daily (with meals)       Potassium Chloride ER 20 MEQ TBCR Take 1 tablet (20 mEq) by mouth daily 90 tablet 1     sildenafil (VIAGRA) 50 MG tablet Take 1 tablet (50 mg) by mouth daily as needed (Patient not taking: Reported on 10/16/2018) 20 tablet 1     thiamine 100 MG tablet Take 1 tablet (100 mg) by mouth daily 60 tablet 0     torsemide (DEMADEX) 20 MG tablet Take 3 tablets (60 mg) by mouth 2 times daily 180 tablet 0     traZODone (DESYREL) 100 MG tablet TAKE 1 TABLET BY MOUTH AT BEDTIME AS NEEDED FOR SLEEP 30 tablet 1       ROS:   Constitutional: No fever, chills, or sweats. Stable weight.   ENT: As per HPI.  Allergies/Immunologic: Negative.   Respiratory: No cough, hemoptysis.   Cardiovascular: As per HPI.   GI: As per HPI.  : No urinary frequency, dysuria, or hematuria.   Integument: Negative.   Psychiatric: As per HPI.  Neuro: Negative.   Endocrinology: Negative.   Musculoskeletal: As per HPI.    EXAM:  /78 (BP Location: Left arm, Patient Position: Chair, Cuff Size: Adult Regular)  Pulse 98  Ht 1.753 m (5' 9\")  Wt 84.8 kg (187 lb)  SpO2 98%  BMI 27.62 kg/m2  General: appears comfortable, alert and articulate  Head: normocephalic, atraumatic  Eyes: anicteric sclera, EOMI  Neck: no adenopathy  Orophyarynx: moist mucosa, no lesions, dentition intact  Heart: regular, S1/S2, soft JENNY at LLSB, JVD at mid neck when sitting upright  Lungs: clear, no rales or wheezing  Abdomen: soft, non-tender, bowel sounds present, no hepatosplenomegaly  Extremities: no clubbing, cyanosis or LE edema  Neurological: normal speech and affect, no gross motor deficits    Labs:  CBC RESULTS:  Lab Results   Component Value Date    WBC 7.9 10/30/2018    RBC 5.70 10/30/2018    HGB 17.9 (H) 10/30/2018    HCT 55.3 (H) 10/30/2018    MCV 97 10/30/2018    MCH 31.4 10/30/2018    MCHC 32.4 10/30/2018    RDW 15.7 (H) 10/30/2018     10/30/2018       CMP " RESULTS:  Lab Results   Component Value Date     (L) 11/13/2018    POTASSIUM 4.5 11/13/2018    CHLORIDE 97 11/13/2018    CO2 28 11/13/2018    ANIONGAP 7 11/13/2018    GLC 98 11/13/2018    BUN 31 (H) 11/13/2018    CR 1.57 (H) 11/13/2018    GFRESTIMATED 45 (L) 11/13/2018    GFRESTBLACK 54 (L) 11/13/2018    ASHLEE 9.4 11/13/2018    BILITOTAL 2.3 (H) 10/24/2018    ALBUMIN 3.6 10/24/2018    ALKPHOS 77 10/24/2018    ALT 14 10/24/2018    AST 30 10/24/2018        INR RESULTS:  Lab Results   Component Value Date    INR 1.31 (H) 10/24/2018       Lab Results   Component Value Date    MAG 2.3 11/13/2018     Lab Results   Component Value Date    NTBNPI 1970 (H) 10/27/2018     Lab Results   Component Value Date    NTBNP 1622 (H) 10/17/2018       Assessment and Plan:   Mr. Danielito Chu is a 63yr old male with a history of NICM (LVEF at dong of 15%) s/p ICD (4/2017), HTN, DMII, SHIRLEY, and HL who presents to CORE clinic for follow up of recent hospitalization.    Labs today show creatinine 1.57 and BUN 31.  He appears slightly hypervolemic/euvolemic.  Advised that he decrease torsemide slightly, to 60mg in the am and 40mg in the pm.  Also, given that he has noted new symptoms since his meds were changed, advised that he discontinue captopril and start lisinopril 5mg daily.  Will plan to repeat a BMP within the next 1-2 weeks, prior to him leaving for [place].  Asked that he return to CORE clinic prior to leaving on 11/26 for further evaluation, and to call with any change or persisting symptoms.    Overall, I am concerned about his worsening symptoms and functional decline.  He expressed that it is very important for him to go to [place] to spend some time with his significant other.  He is scheduled to follow up with CVTS today as part of his LVAD/tx evaluation.  He again states that he does not feel ready for advanced therapies, but wants to feel better.  He thinks that if he can get back on entresto, then he will feel better.   Note that he needs a dental exam.    Chronic systolic heart failure secondary to NICM  Stage C  NYHA Class IIIB  ACEi/ARB:  Yes, discontinue captopril and start lisinopril 5mg daily  BB:  Held due to cardiogenic shock  Aldosterone antagonist:  contraindicated due to hyperkalemia and renal function  SCD prophylaxis:  ICD  Fluid status:  Euvolemic/slightly hypervolemic, decreasing torsemide slightly to 60mg in the am and 40mg in the pm  Sleep evaluation:  Concern for SHIRLEY while inpatient.  Overnight oximetery showed significant desaturations to mid 80s.  Advised to follow up with sleep medicine as outpatient, but he states intolerance to CPAP masks.  Follow Up:  11/26 with labs prior        The above was reviewed with Mr. Chu and his sister, who verbalized understanding and will call with further questions/concerns.    30 minutes spent face-to-face with patient, with >50% in counseling and/or coordination of care as described above.      Gema Chin, TAMANNA, APRN, FNP-BC  Advanced Heart Failure Nurse Practitioner  [facility]  Patient Care Team:  Bashir Hines as PCP - General (Family Practice)  Lorena Watkins MD as MD (Cardiology)  Sparkle Joel, RN as Nurse Coordinator (Cardiology)  Cecelia Palacios, RN as Nurse Coordinator (Cardiology)  Deysi Mattson APRN CNP as Nurse Practitioner (Cardiology)  Kalli Purcell APRN CNP as Nurse Practitioner (Cardiology)  Jermain Hobbs, ARAVIND as Nurse Coordinator (Cardiology)  Giselle Johnson, RN as Nurse Coordinator (Cardiology)  SELF, REFERRED

## 2018-11-13 NOTE — IP AVS SNAPSHOT
[address] [address]    Phone:  [phone]                                       After Visit Summary   11/13/2018    Danielito Chu    MRN: [id number]           After Visit Summary Signature Page     I have received my discharge instructions, and my questions have been answered. I have discussed any challenges I see with this plan with the nurse or doctor.    ..........................................................................................................................................  Patient/Patient Representative Signature      ..........................................................................................................................................  Patient Representative Print Name and Relationship to Patient    ..................................................               ................................................  Date                                   Time    ..........................................................................................................................................  Reviewed by Signature/Title    ...................................................              ..............................................  Date                                               Time          22EPIC Rev 08/18

## 2018-11-13 NOTE — MR AVS SNAPSHOT
After Visit Summary   11/13/2018    Danielito Chu    MRN: [id number]           Patient Information     Date Of Birth          1955        Visit Information        Provider Department      11/13/2018 4:00 PM Andrei Silva MD [facility]        Today's Diagnoses     Acute on chronic systolic congestive heart failure (H)    -  1       Follow-ups after your visit        Your next 10 appointments already scheduled     Nov 19, 2018  8:00 AM CST   IR TRANSCATHETER BIOPSY with UUIR4   [facility], [place], Interventional Radiology ([facility], [facility])    [address] [facility] [address] [phone]           The day of the exam:   Do not eat any solid food or milk products for 6 hours prior to the exam. You may drink clear liquids until 2 hours prior to the exam. Clear liquids include the following: water, Jell-O, clear broth, apple juice or any non-carbonated drink that you can see through (no pop!)   The morning of the exam you may take medications as directed with a sip of water.  Please wear loose clothing, such as a sweat suit or jogging clothes. Avoid snaps, zippers and other metal. We may ask you to undress and put on a hospital gown.  Please bring any scans or X-rays taken at other hospitals, if similar tests were done. Also bring a list of your medicines, including vitamins, minerals and over-the-counter drugs. It is safest to leave personal items at home.  Someone will need to drive you to and from the hospital.  Tell your doctor in advance:   If you have allergies to x-ray contrast or iodine.   If you are or may be pregnant.   If you are taking Coumadin (or any other blood thinners) 5 days prior to the exam for any special instructions.   If you are diabetic to determine if your insulin needs have to be adjusted for the exam.  Your doctor will:   Need to do a history and physical within 30 days before this  procedure.   Obtain necessary laboratory tests prior to the exam (CBCP, INR and PTT).  If you were given sedation, you cannot drive for 24 hours after the procedure, and an adult must be with you until then.  If you have any questions, please call the Imaging Department where you will have your exam. Directions, parking instructions, and other information are available on our website, [email or web address].            Nov 19, 2018  8:30 AM CST   IR PORTOGRAM W HEMODYNAMICS with UUIR4   [facility], [place], Interventional Radiology ([facility])    [address] [facility] [address] [phone]           The day before the exam:   You may eat your regular diet.   You are encouraged to drink at least 8 eight ounce glasses of clear liquids.   Drink no alcoholic beverages for 24 hours before or after the exam.  The day of the exam:   Do not eat any solid food or milk products for 6 hours prior to the exam. You may drink clear liquids until 2 hours prior to the exam. Clear liquids include the following: water, Jell-O, clear broth, apple juice or any non-carbonated drink that you can see through (no pop!)   The morning of the exam you may take medications as directed with a sip of water.  You should not have received barium (x-ray contrast) within 48 hours of this exam.  Please wear loose clothing, such as a sweat suit or jogging clothes. Avoid snaps, zippers and other metal. We may ask you to undress and put on a hospital gown.  Please bring any scans or X-rays taken at other hospitals, if similar tests were done. Also bring a list of your medicines, including vitamins, minerals and over-the-counter drugs. It is safest to leave personal items at home.  Someone will need to drive you to and from the hospital.  Tell your doctor in advance:   If you have allergies to x-ray contrast or iodine.   If you are or may be pregnant.   If you are taking Coumadin (or any  other blood thinners) 5 days prior to the exam for any special instructions.   If you are diabetic to determine if your insulin needs have to be adjusted for the exam.  Your doctor will:   Need to do a history and physical within 30 days before this procedure.   Obtain necessary laboratory tests prior to the exam (creatinine, Hgb/Hct, platelet count, and INR).  Following the exam:   The possibility exists that you may need to remain at the hospital for 2-4 hours post procedure.   If you were given sedation, you cannot drive for 24 hours after the procedure, and an adult must be with you until then.  If you have any questions, please call the Imaging Department where you will have your exam. Directions, parking instructions, and other information are available on our website, [email or web address].            Nov 20, 2018   Procedure with Andrei Silva MD   [facility], [place], Same Day Surgery (--)    [address] [facility] [address]   [phone]            Nov 26, 2018  9:45 AM CST   Lab with [employer] LAB   [facility] ([facility])    [address] [place] [address]  [address] [facility] [address]   [phone]            Nov 26, 2018 10:30 AM CST   Implanted Defibulator with  Cv Device 1   [facility] ([facility])    [address] [place] [address]  [id number]               Nov 26, 2018 11:00 AM CST   (Arrive by 10:45 AM)   CORE RETURN with Minoo Lopez NP   [facility] ([facility])    [address] [place] [address]  [address]  [facility] [address]   [phone]            Jan 03, 2019  9:00 AM CST   Lab with UC LAB   [facility] ([facility])    [address] [place] [address]  1st Floor  [facility] [address]   [phone]            Jan 03, 2019  9:30 AM CST   (Arrive by 9:15 AM)   RETURN HEART FAILURE with Lorena Watkins MD   [facility])    [address]  [address]  [facility] [address] [phone]            Jan 21, 2019  1:00 PM CST   (Arrive by 12:45 PM)   Implanted Defibulator with Uc Cv Device 1   [facility] ([facility] [place])    [address] [facility]  3rd Floor  [address]                 Who to contact     If you have questions or need follow up information about today's clinic visit or your schedule please contact [facility] directly at [phone].  Normal or non-critical lab and imaging results will be communicated to you by MyChart, letter or phone within 4 business days after the clinic has received the results. If you do not hear from us within 7 days, please contact the clinic through MyChart or phone. If you have a critical or abnormal lab result, we will notify you by phone as soon as possible.  Submit refill requests through [employer] or call your pharmacy and they will forward the refill request to us. Please allow 3 business days for your refill to be completed.          Additional Information About Your Visit        Care EveryWhere ID     This is your Care EveryWhere ID. This could be used by other organizations to access your [facility] medical records  [id number]        Your Vitals Were     Pulse Height Pulse Oximetry BMI (Body Mass Index)          100 1.753 m (5' 9\") 98% 27.62 kg/m2         Blood Pressure from Last 3 Encounters:   11/17/18 105/74   11/13/18 102/78   11/13/18 102/78    Weight from Last 3 Encounters:   11/17/18 83 kg (183 lb)   11/13/18 84.8 kg (187 lb)   11/13/18 84.8 kg (187 lb)              Today, you had the following     No orders found for display         Today's Medication Changes          These changes are accurate as of 11/13/18  8:16 PM.  If you have any questions, ask your nurse or doctor.               Start taking these medicines.        Dose/Directions    lisinopril 5 MG tablet   Commonly known as:  PRINIVIL/ZESTRIL   Used for:  Chronic systolic heart " failure (H)   Started by:  Gema Chin NP        Dose:  5 mg   Take 1 tablet (5 mg) by mouth daily   Quantity:  90 tablet   Refills:  1         These medicines have changed or have updated prescriptions.        Dose/Directions    torsemide 20 MG tablet   Commonly known as:  DEMADEX   This may have changed:    - how much to take  - how to take this  - when to take this  - additional instructions   Changed by:  Gema Chin NP        Take 60mg in the am and 40mg in the pm   Quantity:  180 tablet   Refills:  0         Stop taking these medicines if you haven't already. Please contact your care team if you have questions.     captopril 12.5 MG tablet   Commonly known as:  CAPOTEN   Stopped by:  Gema Chin NP                Where to get your medicines      These medications were sent to [facility]/pharmacy #[id number] - [address] [address]     Phone:  [phone]     lisinopril 5 MG tablet    torsemide 20 MG tablet                Primary Care Provider Office Phone # Fax #    Bashir Hines [phone]       [address] [address]        Equal Access to Services     [facility]: Hadii andres ku hadasho Soomaali, waaxda luqadaha, qaybta kaalmada adeegyada, aaron burks hayeliza nelson . [facility] [phone].    ATENCIÓN: Si habla español, tiene a stokes disposición servicios gratuitos de asistencia lingüística. [facility] [phone].    We comply with applicable federal civil rights laws and [place] laws. We do not discriminate on the basis of race, color, national origin, age, disability, sex, sexual orientation, or gender identity.            Thank you!     Thank you for choosing [facility]  for your care. Our goal is always to provide you with excellent care. Hearing back from our patients is one way we can continue to improve our services. Please take a few minutes to complete the  written survey that you may receive in the mail after your visit with us. Thank you!             Your Updated Medication List - Protect others around you: Learn how to safely use, store and throw away your medicines at www.disposemymeds.org.          This list is accurate as of 11/13/18  8:16 PM.  Always use your most recent med list.                   Brand Name Dispense Instructions for use Diagnosis    artificial saliva Liqd liquid     59 mL    Swish and spit 10 mLs in mouth 4 times daily as needed for dry mouth        aspirin 81 MG EC tablet     30 tablet    Take 1 tablet (81 mg) by mouth daily    Acute systolic congestive heart failure (H)       atorvastatin 10 MG tablet    LIPITOR    90 tablet    Take 1 tablet (10 mg) by mouth daily    Acute systolic congestive heart failure (H)       Blood Glucose Monitor System w/Device Kit      three times a week        buPROPion 150 MG 24 hr tablet    WELLBUTRIN XL    60 tablet    Take 1 tablet (150 mg) by mouth daily    Depression, unspecified depression type       digoxin 250 MCG tablet    LANOXIN    90 tablet    TAKE 1 TABLET (250 MCG) BY MOUTH DAILY    Acute on chronic systolic heart failure (H)       lisinopril 5 MG tablet    PRINIVIL/ZESTRIL    90 tablet    Take 1 tablet (5 mg) by mouth daily    Chronic systolic heart failure (H)       metFORMIN 500 MG tablet    GLUCOPHAGE     Take 1 tablet (500 mg) by mouth 2 times daily (with meals)    Chronic systolic heart failure (H), Acute systolic congestive heart failure (H)       potassium chloride ER 20 MEQ ER tablet    K-TAB    90 tablet    Take 1 tablet (20 mEq) by mouth daily        sildenafil 50 MG tablet    VIAGRA    20 tablet    Take 1 tablet (50 mg) by mouth daily as needed    Drug-induced impotence       thiamine 100 MG tablet     60 tablet    Take 1 tablet (100 mg) by mouth daily    Acute on chronic systolic heart failure (H)       torsemide 20 MG tablet    DEMADEX    180 tablet    Take 60mg in the am and 40mg in  the pm        traZODone 100 MG tablet    DESYREL    30 tablet    TAKE 1 TABLET BY MOUTH AT BEDTIME AS NEEDED FOR SLEEP    Insomnia, unspecified type

## 2018-11-13 NOTE — PATIENT INSTRUCTIONS
"You were seen today in the Cardiovascular Clinic at the [facility].     Cardiology Providers you saw during your visit: Gema Chin NP     Follow up and medication changes:  1. Discontinue captopril.  2. Start lisinopril 5mg daily  3. Decrease torsemide to 60mg in the morning and 40mg in the evening  4. Repeat labs in 2 weeks with appointments on     Results for JUAN SHEN (MRN [id number]) as of 2018 14:18   Ref. Range 2018 10:46   Sodium Latest Ref Range: 133 - 144 mmol/L 132 (L)   Potassium Latest Ref Range: 3.4 - 5.3 mmol/L 4.5   Chloride Latest Ref Range: 94 - 109 mmol/L 97   Carbon Dioxide Latest Ref Range: 20 - 32 mmol/L 28   Urea Nitrogen Latest Ref Range: 7 - 30 mg/dL 31 (H)   Creatinine Latest Ref Range: 0.66 - 1.25 mg/dL 1.57 (H)   GFR Estimate Latest Ref Range: >60 mL/min/1.7m2 45 (L)   GFR Estimate If Black Latest Ref Range: >60 mL/min/1.7m2 54 (L)   Calcium Latest Ref Range: 8.5 - 10.1 mg/dL 9.4   Anion Gap Latest Ref Range: 3 - 14 mmol/L 7   Magnesium Latest Ref Range: 1.6 - 2.3 mg/dL 2.3   Glucose Latest Ref Range: 70 - 99 mg/dL 98           Please limit your fluid intake to 2 L (68 ounces) daily.  2 Liters a day = 8.5 cups, or 68 ounces.  Please limit your salt intake to 2 grams a day or less.     If you gain 2# in 24 hours or 5# in one week call Giselle Johnson RN so we can adjust your medications as needed over the phone.     Please feel free to call me with any questions or concerns.       Giselle Johnson RN  [facility]  Cardiology Care Coordinator-Heart Failure Clinic     Questions and [phone].   First press #1 for the [employer] and then press #3 for \"Medical Questions\" to reach us Cardiology Nurses.      On Call Cardiologist for after hours or on weekends: [phone]   option #4 and ask to speak to the on-call Cardiologist. Inform them you are a CORE/heart failure patient at the [facility].        If you need a " medication refill please contact your pharmacy.  Please allow 3 business days for your refill to be completed.  _______________________________________________________  C.O.R.E. CLINIC Cardiomyopathy, Optimization, Rehabilitation, Education   The C.O.R.E. CLINIC is a heart failure specialty clinic within the [facility] where you will work with specialized nurse practitioners dedicated to helping patients with heart failure carefully adjust medications, receive education, and learn who and when to call if symptoms develop. They specialize in helping you better understand your condition, slow the progression of your disease, improve the length and quality of your life, help you detect future heart problems before they become life threatening, and avoid hospitalizations.  As always, thank you for trusting us with your health care needs!

## 2018-11-13 NOTE — IP AVS SNAPSHOT
STOP!!! DO NOT PRINT OR REFERENCE THIS AVS!!!  AVS displayed here is NOT the version that was given to the patient at discharge.  GO TO CHART REVIEW to print or review a copy of the AVS that was frozen/printed at time of discharge.                           MRN:[id number]                      After Visit Summary   11/13/2018    Danielito Chu    MRN: [id number]           Thank you!     Thank you for choosing [facility] for your care. Our goal is always to provide you with excellent care. Hearing back from our patients is one way we can continue to improve our services. Please take a few minutes to complete the written survey that you may receive in the mail after you visit with us. Thank you!        Patient Information     Date Of Birth          1955        Designated Caregiver       Most Recent Value    Caregiver    Will someone help with your care after discharge? yes    Name of designated caregiver Fani    Phone number of caregiver [phone]    Caregiver address same as patient      About your hospital stay     You were admitted on:  November 13, 2018 You last received care in the:  [address] [facility]    You were discharged on:  November 21, 2018        Reason for your hospital stay       Dear Danielito Chu    Your were hospitalized at [facility] for heart failure and undogoing LVAD workup. Today you are ready to be discharged.      Please get the following tests done:  - BMP check on Monday 11/26    Follow up with CORE clinic in 1-2 weeks    It was a pleasure meeting with you today. Thank you for allowing me and my team the privilege of caring for you today. You are the reason we are here, and I truly hope we provided you with the excellent service you deserve. Please let us know if there is anything else we can do for you so that we can be sure you are leaving completely satisfied with your care experience.    Your hospital unit at the time of discharge is 6C  so if you have any questions please call the hospital at [phone] and ask to talk to a nurse on 6C.    Take care.  Mary Esposito MD                  Who to Call     For medical emergencies, please call [phone].  For non-urgent questions about your medical care, please call your primary care provider or clinic, [phone]  For questions related to your surgery, please call your surgery clinic        Attending Provider     Provider Specialty    Romaine Mondragon MD Internal Medicine       Primary Care Provider Office Phone # Fax #    Bashir Hines [phone]      After Care Instructions     Activity       Your activity upon discharge: activity as tolerated            Diet       Follow this diet upon discharge: Orders Placed This Encounter      Fluid restriction 2000 ML FLUID      Advance Diet as Tolerated: Regular Diet Adult                  Follow-up Appointments     Adult [facility] Follow-up and recommended labs and tests       Follow up with Dr. Watkins or CORE clinic in 1-2 weeks    Appointments on [place] and/or [facility] (with [facility] or [facility] provider or service). Call [phone] if you haven't heard regarding these appointments within 7 days of discharge.            Follow Up and recommended labs and tests       ______________________________________________________    [facility]   Phone: [phone]   Fax: [phone]    Appointment with Dr. Bryan Orellana on 11/23/18 at 12:45 PM for PICC line dressing change, establishment of care, post hospitalization follow up.   ______________________________________________________            Follow Up and recommended labs and tests       BMP and Mg on 11/26                  Your next 10 appointments already scheduled     Nov 26, 2018  9:45 AM CST   Lab with [facility] ([facility] [place])    [address] [address] [phone]            Nov 26, 2018 10:30 AM CST    Implanted Defibulator with Uc Cv Device 1   [facility] ([facility])    [address] [place] [address]  3rd [facility]  [address]               Nov 26, 2018 11:00 AM CST   (Arrive by 10:45 AM)   CORE RETURN with Minoo Lopez NP   [facility] ([facility])    [address] [place] [address]  [address]  [facility] [id number] [phone]            Dec 10, 2018  3:30 PM CST   LAB with  LAB   [facility] ([facility])    [address] [place] [address]  1st [facility] [id number] [phone]           Please do not eat 10-12 hours before your appointment if you are coming in fasting for labs on lipids, cholesterol, or glucose (sugar). This does not apply to pregnant women. Water, hot tea and black coffee (with nothing added) are okay. Do not drink other fluids, diet soda or chew gum.            Dec 10, 2018  4:30 PM CST   (Arrive by 4:15 PM)   Hospital Follow Up with Candido Pedroza MD   [facility] ([facility])    [address] [place] [address]  [address]  [facility] [id number] [phone]            Jan 03, 2019  9:00 AM CST   Lab with  LAB   [facility] ([facility])    [address] [place] [id number] [phone]            Jan 03, 2019  9:30 AM CST   (Arrive by 9:15 AM)   RETURN HEART FAILURE with Lorena Watkins MD   [facility] ([facility])    [address] [place] [address]  [address] [place] [id number] [phone]            Jan 21, 2019  1:00 PM CST   (Arrive by 12:45 PM)   Implanted Defibulator with Uc Cv Device 1   [facility] ([facility])    [address] [place] [address]  [address]                 Additional Services     Home infusion referral       Your provider has referred you to:  [facility]   Phone: [phone]   Fax: [phone]    For  Milrinone drip and PICC line care and teaching.     Anticipated Length of Therapy: TBD    Home Infusion Pharmacist to adjust therapy based on labs and clinical assessments: No (Home Infusion will call for order)    Labs:  May draw labs from Venous Catheter: No  Home Infusion Pharmacist to order labs based on therapy type and clinical assessments: No   Call/Fax Lab Results to: Dr. Lorena Watkins    Agency Staff to assess nursing needs for Infusion Therapy.    Access Device Management:  IV Access Type: PICC  Routine site care (per agency protocol) to maintain access device? Yes            Medication Therapy Management Referral       MTM referral reason            Patient has 5 PTA or Discharge Medications AND one of the following   diagnoses: DM,HF,COPD,AMI DX,PULM HTN       This service is designed to help you get the most from your medications.  A specially trained pharmacist will work closely with you and your doctors  to solve any problems related to your medications and to help you get the   best results from taking them.      The Medication Therapy Management staff will call you to schedule an appointment.                  General Recommendations To Control Heart Failure When You Get Home     Heart Failure Instructions for Patients and Families: Please read and check off each of these important instructions as you do them when you get home.     Weight and Symptoms    ___ Put a scale in your bathroom.    ___ Post a weight chart or calendar next to your scale.    ___ Weigh yourself everyday as soon as you get up in the morning (before breakfast).  You should only be wearing your pajamas.  Write your weight on the chart/calendar.    ___ Bring your weight chart/calendar with you to all appointments.    ___ Call your doctor or nurse practitioner if you gain 2 pounds (in 1 day) or 5 pounds in (1 week) from your goal  good  weight.  Your good weight is also called your  dry  weight.  Your doctor or nurse will tell  you what your good weight should be.    ___ Call your doctor or nurse practitioner if you have shortness of breath that gets worse over time, leg swelling or fatigue.    Medications and Diet    ___ Make sure to take your medication as prescribed.    ___ Bring a current list of your medication and all of your medicine bottles with you to all appointments.    ___ Limit fluids if you still have swelling or shortness of breath, or if your doctor tells you to do so.      ___ Eat less than 2000 mg of sodium (salt) every day. Read food labels, and do not add salt to meals. Remember, if you eat less salt you retain less fluid.    ___ Follow a heart healthy diet that is low in saturated fat.         Activity and Suggested Lifestyle Changes   ___ Stay active. Talk to your doctor about an exercise program that is safe for your heart.    ___ Stop smoking. Reduce alcohol use.      ___ Lose weight if you are overweight. Extra weight puts a lot of stress on the heart.    Control for Leg Swelling  ___ Keep your legs elevated (raised) as needed for swelling. If swelling is uncomfortable or elevation doesn t help, ask your doctor about using ACE wraps or support stockings.      What is the C.O.R.E. Clinic?     Cardiomyopathy  Optimization  Rehabilitation  Education    The C.O.R.E. Clinic is a heart failure specialty clinic within [facility].  It is an outpatient disease management program that is based on a phase-by-phase approach, which is tailored to each patient s individual needs.  The cardiologist, nurse practitioner, physician assistant and nurses provide an ongoing outpatient care and treatment plan that guides heart failure and cardiomyopathy patients from evaluation and education to stabilization. This team works with your current primary care doctor and cardiologist to help you:      Avoid hospitalizations    Slow the progression of the disease    Improve length and quality of life    Know who and when to call if  heart failure symptoms appear    Receive easy access to quality health care and advice    Better understand your condition and treatment    Decrease the tremendous cost burden of heart failure care    Detect future heart problems before they become life threatening    Your C.O.R.E. Clinic Team will continue to educate you on your heart failure and may adjust medications based on your vital signs, lab work, and how you are feeling.  Therefore, it is very important to bring the following to all C.O.R.E. appointments:    - An accurate list of your medications  - Your medicine bottles  - Your weight chart/calendar    [facility]):    [phone]  [facility] ([place]):    [phone]  [facility] ([place]):  [phone]        Pending Results     No orders found from 11/11/2018 to 11/14/2018.            Statement of Approval     Ordered          11/20/18 3955  I have reviewed and agree with all the recommendations and orders detailed in this document.  EFFECTIVE NOW     Approved and electronically signed by:  Mary Esposito MD             Admission Information     Date & Time Department Dept. Phone    11/13/2018 [address] [facility] [phone]      Your Vitals Were     Blood Pressure Pulse Temperature Respirations Weight Pulse Oximetry    116/80 98 97.5  F (36.4  C) (Axillary) 19 80.2 kg (176 lb 11.2 oz) 96%    BMI (Body Mass Index)                   26.09 kg/m2           Care EveryWhere ID     This is your Care EveryWhere ID. This could be used by other organizations to access your [place] medical records  [id number]        Equal Access to Services     ALEX RODGERS : Hadii andres fuenteso Sotabatha, waaxda luqadaha, qaybta kaalmada venuyacarlos alberto, aaron pearson. So [facility] [phone].    ATENCIÓN: Si habla español, tiene a stokes disposición servicios gratuitos de asistencia lingüística. Llame al [phone].    We  comply with applicable federal civil rights laws and [place] laws. We do not discriminate on the basis of race, color, national origin, age, disability, sex, sexual orientation, or gender identity.               Review of your medicines      START taking        Dose / Directions    hydrALAZINE 25 MG tablet   Commonly known as:  APRESOLINE   Used for:  Acute on chronic systolic congestive heart failure (H)        Dose:  75 mg   Take 3 tablets (75 mg) by mouth 3 times daily   Quantity:  120 tablet   Refills:  3       isosorbide dinitrate 20 MG tablet   Commonly known as:  ISORDIL   Used for:  Acute on chronic systolic congestive heart failure (H)        Dose:  20 mg   Take 1 tablet (20 mg) by mouth 3 times daily   Quantity:  90 tablet   Refills:  2       milrinone 20-5 MG/100ML-% infusion   Commonly known as:  PRIMACOR   Used for:  Acute on chronic systolic congestive heart failure (H)        Dose:  0.125 mcg/kg/min   Inject 10.3 mcg/min into the vein continuous   Quantity:  1000 mL   Refills:  3       omeprazole 20 MG CR capsule   Commonly known as:  priLOSEC   Used for:  Gastritis without bleeding, unspecified chronicity, unspecified gastritis type        Dose:  20 mg   Start taking on:  11/22/2018   Take 1 capsule (20 mg) by mouth every morning (before breakfast)   Quantity:  90 capsule   Refills:  3       predniSONE 10 MG tablet   Commonly known as:  DELTASONE   Used for:  Acute gouty arthritis        Dose:  10 mg   Start taking on:  11/22/2018   Take 1 tablet (10 mg) by mouth daily for 2 days   Quantity:  2 tablet   Refills:  0         CONTINUE these medicines which may have CHANGED, or have new prescriptions. If we are uncertain of the size of tablets/capsules you have at home, strength may be listed as something that might have changed.        Dose / Directions    potassium chloride ER 20 MEQ ER tablet   Commonly known as:  K-TAB   This may have changed:    - how much to take  - when to take this   Used for:   Acute on chronic systolic congestive heart failure (H)        Dose:  60 mEq   Take 3 tablets (60 mEq) by mouth 2 times daily   Quantity:  180 tablet   Refills:  11       torsemide 20 MG tablet   Commonly known as:  DEMADEX   This may have changed:    - how much to take  - how to take this  - when to take this  - additional instructions   Used for:  Acute on chronic systolic congestive heart failure (H)        Dose:  80 mg   Take 4 tablets (80 mg) by mouth 2 times daily   Quantity:  180 tablet   Refills:  0         CONTINUE these medicines which have NOT CHANGED        Dose / Directions    artificial saliva Liqd liquid   Used for:  Acute on chronic systolic congestive heart failure (H)        Dose:  10 mL   Swish and spit 10 mLs in mouth 4 times daily as needed for dry mouth   Quantity:  59 mL   Refills:  0       aspirin 81 MG EC tablet   Commonly known as:  ASA   Used for:  Acute systolic congestive heart failure (H)        Dose:  81 mg   Take 1 tablet (81 mg) by mouth daily   Quantity:  30 tablet   Refills:  3       atorvastatin 10 MG tablet   Commonly known as:  LIPITOR   Used for:  Acute systolic congestive heart failure (H)        Dose:  10 mg   Take 1 tablet (10 mg) by mouth daily   Quantity:  90 tablet   Refills:  3       Blood Glucose Monitor System w/Device Kit        three times a week   Refills:  0       buPROPion 150 MG 24 hr tablet   Commonly known as:  WELLBUTRIN XL   Used for:  Depression, unspecified depression type        Dose:  150 mg   Take 1 tablet (150 mg) by mouth daily   Quantity:  60 tablet   Refills:  0       metFORMIN 500 MG tablet   Commonly known as:  GLUCOPHAGE   Used for:  Chronic systolic heart failure (H), Acute systolic congestive heart failure (H)        Dose:  500 mg   Take 1 tablet (500 mg) by mouth 2 times daily (with meals)   Refills:  0       thiamine 100 MG tablet   Used for:  Acute on chronic systolic heart failure (H)        Dose:  100 mg   Take 1 tablet (100 mg) by mouth  daily   Quantity:  60 tablet   Refills:  0       traZODone 100 MG tablet   Commonly known as:  DESYREL   Used for:  Insomnia, unspecified type        TAKE 1 TABLET BY MOUTH AT BEDTIME AS NEEDED FOR SLEEP   Quantity:  30 tablet   Refills:  1         STOP taking     digoxin 250 MCG tablet   Commonly known as:  LANOXIN           lisinopril 5 MG tablet   Commonly known as:  PRINIVIL/ZESTRIL           sildenafil 50 MG tablet   Commonly known as:  VIAGRA                Where to get your medicines      These medications were sent to [facility] - [place], [place] - [address] [facility]  [address] [facility] [address]     Phone:  [phone]     hydrALAZINE 25 MG tablet    isosorbide dinitrate 20 MG tablet    omeprazole 20 MG CR capsule    potassium chloride ER 20 MEQ ER tablet    predniSONE 10 MG tablet    torsemide 20 MG tablet         Some of these will need a paper prescription and others can be bought over the counter. Ask your nurse if you have questions.     Bring a paper prescription for each of these medications     milrinone 20-5 MG/100ML-% infusion                Protect others around you: Learn how to safely use, store and throw away your medicines at www.disposemymeds.org.             Medication List: This is a list of all your medications and when to take them. Check marks below indicate your daily home schedule. Keep this list as a reference.      Medications           Morning Afternoon Evening Bedtime As Needed    artificial saliva Liqd liquid   Swish and spit 10 mLs in mouth 4 times daily as needed for dry mouth                                   aspirin 81 MG EC tablet   Commonly known as:  ASA   Take 1 tablet (81 mg) by mouth daily   Last time this was given:  81 mg on 11/21/2018  9:18 AM            8 AM                       atorvastatin 10 MG tablet   Commonly known as:  LIPITOR   Take 1 tablet (10 mg) by mouth daily   Last time this was given:  10 mg on 11/21/2018  9:18 AM             8 AM                       Blood Glucose Monitor System w/Device Kit   three times a week                                buPROPion 150 MG 24 hr tablet   Commonly known as:  WELLBUTRIN XL   Take 1 tablet (150 mg) by mouth daily   Last time this was given:  150 mg on 11/21/2018  9:18 AM            8 AM                       hydrALAZINE 25 MG tablet   Commonly known as:  APRESOLINE   Take 3 tablets (75 mg) by mouth 3 times daily   Last time this was given:  75 mg on 11/21/2018  9:21 AM            8 AM       2 PM       8 PM               isosorbide dinitrate 20 MG tablet   Commonly known as:  ISORDIL   Take 1 tablet (20 mg) by mouth 3 times daily   Last time this was given:  20 mg on 11/21/2018  9:30 AM            8 AM       2 PM       8 PM               metFORMIN 500 MG tablet   Commonly known as:  GLUCOPHAGE   Take 1 tablet (500 mg) by mouth 2 times daily (with meals)   Next Dose Due:  Check with prescribing physician regarding restarting or primary physician                                milrinone 20-5 MG/100ML-% infusion   Commonly known as:  PRIMACOR   Inject 10.3 mcg/min into the vein continuous   Last time this was given:  0.125 mcg/kg/min on 11/20/2018  4:27 PM                                omeprazole 20 MG CR capsule   Commonly known as:  priLOSEC   Take 1 capsule (20 mg) by mouth every morning (before breakfast)   Start taking on:  11/22/2018   Last time this was given:  20 mg on 11/21/2018  9:17 AM            8 AM                       potassium chloride ER 20 MEQ ER tablet   Commonly known as:  K-TAB   Take 3 tablets (60 mEq) by mouth 2 times daily            8 AM        8 PM               predniSONE 10 MG tablet   Commonly known as:  DELTASONE   Take 1 tablet (10 mg) by mouth daily for 2 days   Start taking on:  11/22/2018   Last time this was given:  20 mg on 11/21/2018  9:18 AM            8 AM                       thiamine 100 MG tablet   Take 1 tablet (100 mg) by mouth daily   Last time this  was given:  100 mg on 11/21/2018  9:19 AM            8 AM                       torsemide 20 MG tablet   Commonly known as:  DEMADEX   Take 4 tablets (80 mg) by mouth 2 times daily   Last time this was given:  80 mg on 11/21/2018  9:17 AM            8 AM           4 PM               traZODone 100 MG tablet   Commonly known as:  DESYREL   TAKE 1 TABLET BY MOUTH AT BEDTIME AS NEEDED FOR SLEEP   Last time this was given:  25 mg on 11/20/2018  9:39 PM

## 2018-11-13 NOTE — LETTER
[address]  [place] [address]  Phone: [phone]    November 18, 2018        Danielito Chu  [address] [facility] [address]          To whom it may concern:    RE: Danielito Chu is currently hospitalized and will be unable to make his flight due to the severity of his medical condition. His significant other will also require to remain in the vicinity due to the gravity of his health condition.     He has been hospitalized from  11/14/18 and will remain in the hospital indefinitely until his health condition is improved and he is safe to return home.    Please contact me for questions or concerns.      Sincerely,      Melissa Rehman MD

## 2018-11-13 NOTE — LETTER
11/13/2018      RE: Danielito Chu  [id number] [place] [address]       Dear Colleague,    Thank you for the opportunity to participate in the care of your patient, Danielito Chu, at the [facility]. Please see a copy of my visit note below.    [facility] CARDIOTHORACIC SURGERY CONSULT     Danielito Chu MRN# [id number]   YOB: 1955 Age: 63 year old      Date of Admission:  (Not on file)    Primary care provider: Bashir Hines         Chief Complaint:   Heart failure - evaluate for LVAD     History of Present Illness: Danielito Chu is a 63 year old male with chronic heart failure.  He was recently admitted to the hospital and had change of his medications.  Since discharge he has continued to feel weak, short of breath, and unsteady on his feet.  He is having difficulty managing his diuretic.  He is very concerned about an upcoming trip to [place].    He is seen today alone in clinic.           Assessment and Plan:   Danielito Chu is a 63 year old male with heart failure  1) Recommend admission to the hospital for right heart cath and medical optimization  2) We briefly discussed LVAD implant, anticipated operative course, anticipated post-operative course, risks, benefits and alternatives.   3) Please call with questions or concerns.     Thank you for the opportunity to participate in the care of this patient.           Andrei Silva MD  Cardiothoracic Surgery  [phone]                Past Medical History:     Past Medical History:   Diagnosis Date     Acute systolic congestive heart failure (H) 4/5/2017     Diabetes (H)      HTN (hypertension)      Hyperlipidemia      Mitral regurgitation      Nocturnal oxygen desaturation 3/29/2018     Nonischemic cardiomyopathy (H) 4/5/2017     SHIRLEY (obstructive sleep apnea)      Pacemaker 04/07/2017    ICD 4/7/17               Past Surgical History:     Past Surgical  History:   Procedure Laterality Date     IMPLANT IMPLANTABLE CARDIOVERTER DEFIBRILLATOR                 Social History:     Social History     Social History     Marital status: Single     Spouse name: N/A     Number of children: N/A     Years of education: N/A     Occupational History     Not on file.     Social History Main Topics     Smoking status: Former Smoker     Smokeless tobacco: Never Used      Comment: quit 3/2017     Alcohol use Not on file      Comment: social     Drug use: Not on file     Sexual activity: Not on file     Other Topics Concern     Not on file     Social History Narrative               Family History:     Family History   Problem Relation Age of Onset     Heart Failure Father       at age 86     Heart Failure Paternal Uncle       at 66      Myocardial Infarction Paternal Uncle       at age 62     Coronary Artery Disease Mother       during CABG at age 41              Allergies:    No Known Allergies            Medications:     No current facility-administered medications for this visit.      Current Outpatient Prescriptions   Medication     [DISCONTINUED] torsemide (DEMADEX) 20 MG tablet     Facility-Administered Medications Ordered in Other Visits   Medication     artificial saliva (BIOTENE DRY MOUTHWASH) liquid 10 mL     [START ON 2018] aspirin EC tablet 81 mg     [START ON 2018] atorvastatin (LIPITOR) tablet 10 mg     [START ON 2018] buPROPion (WELLBUTRIN XL) 24 hr tablet 150 mg     Continuing ACE inhibitor/ARB/ARNI from home medication list OR ACE inhibitor/ARB/ARNI order already placed during this visit     Continuing beta blocker from home medication list OR beta blocker order already placed during this visit     [START ON 2018] digoxin (LANOXIN) tablet 250 mcg     HOLD nitroGLYcerin IF     [START ON 2018] lisinopril (PRINIVIL/ZESTRIL) tablet 5 mg     [START ON 2018] thiamine tablet 100 mg             Review of Systems:   A 10  point ROS was performed and is negative other than HPI.          Physical Exam:      Temp:  [97.8  F (36.6  C)] 97.8  F (36.6  C)  Pulse:  [] 100  Heart Rate:  [89] 89  BP: (102-117)/(78) 117/78  SpO2:  [98 %] 98 %    Gen: NAD, resting comfortably in bed  Lungs: CTAB, non-labored breathing  CV: regular rhythm, normal rate  Abd: soft, not tender, not distended  Ext: motor, sensation, pulses intact  Neuro: AOx3    Labs:  Lab Results   Component Value Date    WBC 7.7 11/15/2018     Lab Results   Component Value Date    RBC 5.13 11/15/2018     Lab Results   Component Value Date    HGB 16.2 11/15/2018     Lab Results   Component Value Date    HCT 49.1 11/15/2018     No components found for: MCT  Lab Results   Component Value Date    MCV 96 11/15/2018     Lab Results   Component Value Date    MCH 31.6 11/15/2018     Lab Results   Component Value Date    MCHC 33.0 11/15/2018     Lab Results   Component Value Date    RDW 16.2 11/15/2018     Lab Results   Component Value Date     11/15/2018     Last Comprehensive Metabolic Panel:  Sodium   Date Value Ref Range Status   11/15/2018 136 133 - 144 mmol/L Final     Potassium   Date Value Ref Range Status   11/15/2018 4.1 3.4 - 5.3 mmol/L Final     Chloride   Date Value Ref Range Status   11/15/2018 104 94 - 109 mmol/L Final     Carbon Dioxide   Date Value Ref Range Status   11/15/2018 22 20 - 32 mmol/L Final     Anion Gap   Date Value Ref Range Status   11/15/2018 10 3 - 14 mmol/L Final     Glucose   Date Value Ref Range Status   11/15/2018 146 (H) 70 - 99 mg/dL Final     Urea Nitrogen   Date Value Ref Range Status   11/15/2018 18 7 - 30 mg/dL Final     Creatinine   Date Value Ref Range Status   11/15/2018 1.01 0.66 - 1.25 mg/dL Final     GFR Estimate   Date Value Ref Range Status   11/15/2018 75 >60 mL/min/1.7m2 Final     Comment:     Non  GFR Calc     Calcium   Date Value Ref Range Status   11/15/2018 8.1 (L) 8.5 - 10.1 mg/dL Final        Imaging:  Transthoracic echocardiogram (10/19/18):  Interpretation Summary  Severe left ventricular dilation is present. Severe diffuse hypokinesis is  present. Severely reduced left ventricular systolic function, EF is 18%.  Global right ventricular function is mildly reduced.  Mild aortic stenosis is present, mean gradient across the aortic valve is 10  mmHg. Mild aortic insufficiency is present. Mild to moderate tricuspid  insufficiency is present.  Estimated pulmonary artery systolic pressure is 29 mmHg plus right atrial  pressure. Estimated mean right atrial pressure is 15 mmHg (significantly  elevated).  No pericardial effusion is present.  _____________________________________________________________________________  __        Left Ventricle  Severe left ventricular dilation is present. EDVi = 151 mL/m2. Severe diffuse  hypokinesis is present. Severely reduced left ventricular systolic function,  EF is 18%.     Right Ventricle  Mild right ventricular dilation is present. Global right ventricular function  is mildly reduced. A pacemaker lead is noted in the right ventricle.     Atria  Severe biatrial enlargement is present.     Mitral Valve  Mild mitral annular calcification is present. Mild mitral insufficiency is  present.        Aortic Valve  Mild aortic insufficiency is present. Mild aortic stenosis is present. The  peak aortic velocity is 2.1 m/sec. The mean gradient across the aortic valve  is10 mmHg.     Tricuspid Valve  Mild to moderate tricuspid insufficiency is present. Estimated pulmonary  artery systolic pressure is 29 mmHg plus right atrial pressure.     Pulmonic Valve  The pulmonic valve is normal.     Vessels  Dilation of the inferior vena cava is present with abnormal respiratory  variation in diameter. Estimated mean right atrial pressure is 15 mmHg  (significantly elevated).     Pericardium  No pericardial effusion is present.        Compared to Previous Study  This study was compared  with the study from 10.17.18, LV function is  unchanged .  _____________________________________________________________________________  __  MMode/2D Measurements & Calculations     LA Volume (BP): 97.7 ml  LA Volume Index (BP): 47.9 ml/m2        Doppler Measurements & Calculations  MV E max maadeo: 196.5 cm/sec  MV A max amadeo: 63.1 cm/sec  MV E/A: 3.1  MV dec slope: 701.0 cm/sec2  Ao V2 max: 209.0 cm/sec  Ao max P.0 mmHg  Ao V2 mean: 151.0 cm/sec  Ao mean PG: 10.0 mmHg  Ao V2 VTI: 35.0 cm  AI P1/2t: 342.1 msec  LV V1 max P.2 mmHg  LV V1 max: 73.4 cm/sec  LV V1 VTI: 11.6 cm  TR max amadeo: 230.8 cm/sec  TR max P.5 mmHg     AV Amadeo Ratio (DI): 0.35  E/E' av.9  Lateral E/e': 40.3  Medial E/e': 41.5       Coronary angiogram (4/3/17):  CORONARY ANGIOGRAM:  1. Both coronary arteries arise from their respective cusps normally.  2. Right dominant.  3. Left Main (LM) large caliber vessel with no angiographic evidence of disease.  4. Left Anterior Descending (LAD): is a type 3 vessel which gives rise to septal perforates and four diagonal vessels all without angiographic evidence of disease.  6. Right Coronary Artery (RCA): large caliber vessel which gives rise to a posterior descending artery and a posterolateral branch system all without angiographic evidence of disease.    Right heart cath (10/26/18):  HEMODYNAMICS:  BSA 2 m2  1. HR 90 bpm  2. /64/85 mmHg  3. RA 9/8/7   4. RV 43/9  5. PA 43/23/35   6. PCW 23/32/21   7. PA sat 59.2%   8. PCW sat 91.8%  9. Hgb 17.5 g/dL   10. Timmy CO 3.2   11. Timmy CI 1.6   12. TD CO 4.1   13. TD CI 2.0   14. PVR 3.4   15. SVR 1950    CT Chest (10/19/18):  IMPRESSION:  1. Cardiomegaly. CT findings concerning for mild pulmonary vascular  congestion.  2. Borderline enlarged thoracic, abdominal and pelvic lymph nodes,  likely reactive, nonspecific.  3. CT findings concerning for hepatic intrinsic parenchymal disease.  4. Colonic diverticulosis. No associated CT findings of  acute  diverticulitis.  5. Prominent collateral vessels in the abdomen and pelvis,  nonspecific.       Bilateral Carotid Arterial Duplex (10/19/18):  Study Result    US CAROTID BILATERAL 10/23/2018 5:20 PM      History:  Potential Heart Transplant Recipient Evaluation. Assess  vasculopathy, Pre transplant/Vad eval;       Comparison Study: None     Findings: Grayscale, color, and spectral ultrasound performed.     Right carotid peak velocities systolic/diastolic:  CCA mid:    82/18 cm/s  ICA proximal:  44/10 cm/s  ICA mid:    48/16 cm/s  ICA distal:    49/12cm/s  ICA/CCA:              0.60  ECA:      67/5 cm/s  vertebral:    19/10 cm/s      Trace calcifications at the carotid bulb.     Left carotid peak velocities systolic/diastolic:  CCA mid:    92/14 cm/s  ICA proximal:  57/28 cm/s  ICA mid:    58/21 cm/s  ICA distal:    56/20 cm/s  ICA/CCA:             0.64  ECA:      96/8 cm/s  vertebral:                 26/13     Trace calcifications at the carotid bulb         Impression:  1. Right internal carotid artery: Less than 50% stenosis by velocity  criteria.  2. Right internal carotid artery: Less than 50% stenosis by velocity  criteria.  3. Antegrade flow of the vertebral arteries.            Pulmonary function testing (10/24/18):  FVC: 3.21L (79%)  FEV1: 2.47L (78%)  DLCO: 21.65mL/min/mmHg (78%)        Andrei Silva MD

## 2018-11-14 ENCOUNTER — APPOINTMENT (OUTPATIENT)
Dept: CARDIOLOGY | Facility: CLINIC | Age: 63
DRG: 291 | End: 2018-11-14
Attending: INTERNAL MEDICINE
Payer: COMMERCIAL

## 2018-11-14 ENCOUNTER — APPOINTMENT (OUTPATIENT)
Dept: GENERAL RADIOLOGY | Facility: CLINIC | Age: 63
DRG: 291 | End: 2018-11-14
Attending: INTERNAL MEDICINE
Payer: COMMERCIAL

## 2018-11-14 ENCOUNTER — ALLIED HEALTH/NURSE VISIT (OUTPATIENT)
Dept: CARDIOLOGY | Facility: CLINIC | Age: 63
End: 2018-11-14
Payer: COMMERCIAL

## 2018-11-14 ENCOUNTER — APPOINTMENT (OUTPATIENT)
Dept: CT IMAGING | Facility: CLINIC | Age: 63
DRG: 291 | End: 2018-11-14
Attending: INTERNAL MEDICINE
Payer: COMMERCIAL

## 2018-11-14 DIAGNOSIS — I42.8 NONISCHEMIC CARDIOMYOPATHY (H): Primary | ICD-10-CM

## 2018-11-14 LAB
ABO + RH BLD: NORMAL
ABO + RH BLD: NORMAL
ANION GAP SERPL CALCULATED.3IONS-SCNC: 11 MMOL/L (ref 3–14)
ANION GAP SERPL CALCULATED.3IONS-SCNC: 8 MMOL/L (ref 3–14)
BASE DEFICIT BLDV-SCNC: 0.9 MMOL/L
BASE DEFICIT BLDV-SCNC: 4.1 MMOL/L
BASE EXCESS BLDV CALC-SCNC: 0.9 MMOL/L
BASE EXCESS BLDV CALC-SCNC: 1.6 MMOL/L
BASE EXCESS BLDV CALC-SCNC: 2 MMOL/L
BASE EXCESS BLDV CALC-SCNC: 2.1 MMOL/L
BASE EXCESS BLDV CALC-SCNC: 2.8 MMOL/L
BASE EXCESS BLDV CALC-SCNC: 3.4 MMOL/L
BLD GP AB SCN SERPL QL: NORMAL
BLOOD BANK CMNT PATIENT-IMP: NORMAL
BUN SERPL-MCNC: 21 MG/DL (ref 7–30)
BUN SERPL-MCNC: 31 MG/DL (ref 7–30)
CALCIUM SERPL-MCNC: 7.3 MG/DL (ref 8.5–10.1)
CALCIUM SERPL-MCNC: 8.8 MG/DL (ref 8.5–10.1)
CHLORIDE SERPL-SCNC: 106 MMOL/L (ref 94–109)
CHLORIDE SERPL-SCNC: 98 MMOL/L (ref 94–109)
CO2 SERPL-SCNC: 24 MMOL/L (ref 20–32)
CO2 SERPL-SCNC: 25 MMOL/L (ref 20–32)
CREAT SERPL-MCNC: 0.94 MG/DL (ref 0.66–1.25)
CREAT SERPL-MCNC: 1.23 MG/DL (ref 0.66–1.25)
ERYTHROCYTE [DISTWIDTH] IN BLOOD BY AUTOMATED COUNT: 16.2 % (ref 10–15)
GFR SERPL CREATININE-BSD FRML MDRD: 60 ML/MIN/1.7M2
GFR SERPL CREATININE-BSD FRML MDRD: 81 ML/MIN/1.7M2
GLUCOSE BLDC GLUCOMTR-MCNC: 79 MG/DL (ref 70–99)
GLUCOSE SERPL-MCNC: 121 MG/DL (ref 70–99)
GLUCOSE SERPL-MCNC: 140 MG/DL (ref 70–99)
HCO3 BLDV-SCNC: 20 MMOL/L (ref 21–28)
HCO3 BLDV-SCNC: 23 MMOL/L (ref 21–28)
HCO3 BLDV-SCNC: 25 MMOL/L (ref 21–28)
HCO3 BLDV-SCNC: 26 MMOL/L (ref 21–28)
HCO3 BLDV-SCNC: 27 MMOL/L (ref 21–28)
HCO3 BLDV-SCNC: 28 MMOL/L (ref 21–28)
HCT VFR BLD AUTO: 47.3 % (ref 40–53)
HGB BLD-MCNC: 15.7 G/DL (ref 13.3–17.7)
INTERPRETATION ECG - MUSE: NORMAL
LACTATE BLD-SCNC: 0.6 MMOL/L (ref 0.7–2)
LACTATE BLD-SCNC: 0.9 MMOL/L (ref 0.7–2)
LACTATE BLD-SCNC: 1.3 MMOL/L (ref 0.7–2)
LACTATE BLD-SCNC: 1.3 MMOL/L (ref 0.7–2)
LACTATE BLD-SCNC: 2.7 MMOL/L (ref 0.7–2)
MAGNESIUM SERPL-MCNC: 2 MG/DL (ref 1.6–2.3)
MAGNESIUM SERPL-MCNC: 2.6 MG/DL (ref 1.6–2.3)
MCH RBC QN AUTO: 31.3 PG (ref 26.5–33)
MCHC RBC AUTO-ENTMCNC: 33.2 G/DL (ref 31.5–36.5)
MCV RBC AUTO: 94 FL (ref 78–100)
O2/TOTAL GAS SETTING VFR VENT: 21 %
OXYHGB MFR BLDV: 50 %
OXYHGB MFR BLDV: 51 %
OXYHGB MFR BLDV: 53 %
OXYHGB MFR BLDV: 58 %
OXYHGB MFR BLDV: 61 %
OXYHGB MFR BLDV: 63 %
OXYHGB MFR BLDV: 64 %
OXYHGB MFR BLDV: 68 %
PCO2 BLDV: 31 MM HG (ref 40–50)
PCO2 BLDV: 33 MM HG (ref 40–50)
PCO2 BLDV: 37 MM HG (ref 40–50)
PCO2 BLDV: 38 MM HG (ref 40–50)
PCO2 BLDV: 39 MM HG (ref 40–50)
PCO2 BLDV: 40 MM HG (ref 40–50)
PCO2 BLDV: 41 MM HG (ref 40–50)
PCO2 BLDV: 41 MM HG (ref 40–50)
PH BLDV: 7.41 PH (ref 7.32–7.43)
PH BLDV: 7.42 PH (ref 7.32–7.43)
PH BLDV: 7.43 PH (ref 7.32–7.43)
PH BLDV: 7.43 PH (ref 7.32–7.43)
PH BLDV: 7.44 PH (ref 7.32–7.43)
PH BLDV: 7.44 PH (ref 7.32–7.43)
PH BLDV: 7.45 PH (ref 7.32–7.43)
PH BLDV: 7.46 PH (ref 7.32–7.43)
PHOSPHATE SERPL-MCNC: 4 MG/DL (ref 2.5–4.5)
PLATELET # BLD AUTO: 159 10E9/L (ref 150–450)
PO2 BLDV: 30 MM HG (ref 25–47)
PO2 BLDV: 32 MM HG (ref 25–47)
PO2 BLDV: 34 MM HG (ref 25–47)
PO2 BLDV: 36 MM HG (ref 25–47)
PO2 BLDV: 36 MM HG (ref 25–47)
PO2 BLDV: 37 MM HG (ref 25–47)
PO2 BLDV: 38 MM HG (ref 25–47)
PO2 BLDV: 39 MM HG (ref 25–47)
POTASSIUM BLD-SCNC: 2 MMOL/L (ref 3.4–5.3)
POTASSIUM SERPL-SCNC: 3.3 MMOL/L (ref 3.4–5.3)
POTASSIUM SERPL-SCNC: 3.4 MMOL/L (ref 3.4–5.3)
POTASSIUM SERPL-SCNC: 3.6 MMOL/L (ref 3.4–5.3)
RBC # BLD AUTO: 5.01 10E12/L (ref 4.4–5.9)
SODIUM SERPL-SCNC: 134 MMOL/L (ref 133–144)
SODIUM SERPL-SCNC: 139 MMOL/L (ref 133–144)
SPECIMEN EXP DATE BLD: NORMAL
WBC # BLD AUTO: 7.6 10E9/L (ref 4–11)

## 2018-11-14 PROCEDURE — 25000132 ZZH RX MED GY IP 250 OP 250 PS 637: Performed by: STUDENT IN AN ORGANIZED HEALTH CARE EDUCATION/TRAINING PROGRAM

## 2018-11-14 PROCEDURE — 70486 CT MAXILLOFACIAL W/O DYE: CPT

## 2018-11-14 PROCEDURE — 93503 INSERT/PLACE HEART CATHETER: CPT | Mod: GC | Performed by: INTERNAL MEDICINE

## 2018-11-14 PROCEDURE — 25500064 ZZH RX 255 OP 636: Performed by: INTERNAL MEDICINE

## 2018-11-14 PROCEDURE — 99233 SBSQ HOSP IP/OBS HIGH 50: CPT | Mod: 25 | Performed by: INTERNAL MEDICINE

## 2018-11-14 PROCEDURE — 83735 ASSAY OF MAGNESIUM: CPT | Performed by: INTERNAL MEDICINE

## 2018-11-14 PROCEDURE — 25000128 H RX IP 250 OP 636: Performed by: STUDENT IN AN ORGANIZED HEALTH CARE EDUCATION/TRAINING PROGRAM

## 2018-11-14 PROCEDURE — 93325 DOPPLER ECHO COLOR FLOW MAPG: CPT | Mod: 26 | Performed by: INTERNAL MEDICINE

## 2018-11-14 PROCEDURE — 83605 ASSAY OF LACTIC ACID: CPT | Performed by: INTERNAL MEDICINE

## 2018-11-14 PROCEDURE — 93321 DOPPLER ECHO F-UP/LMTD STD: CPT

## 2018-11-14 PROCEDURE — 40000556 ZZH STATISTIC PERIPHERAL IV START W US GUIDANCE

## 2018-11-14 PROCEDURE — 25000132 ZZH RX MED GY IP 250 OP 250 PS 637: Performed by: INTERNAL MEDICINE

## 2018-11-14 PROCEDURE — 84132 ASSAY OF SERUM POTASSIUM: CPT | Performed by: INTERNAL MEDICINE

## 2018-11-14 PROCEDURE — 84100 ASSAY OF PHOSPHORUS: CPT | Performed by: INTERNAL MEDICINE

## 2018-11-14 PROCEDURE — 25000125 ZZHC RX 250: Performed by: INTERNAL MEDICINE

## 2018-11-14 PROCEDURE — 02HQ32Z INSERTION OF MONITORING DEVICE INTO RIGHT PULMONARY ARTERY, PERCUTANEOUS APPROACH: ICD-10-PCS | Performed by: INTERNAL MEDICINE

## 2018-11-14 PROCEDURE — 20000004 ZZH R&B ICU UMMC

## 2018-11-14 PROCEDURE — 85027 COMPLETE CBC AUTOMATED: CPT | Performed by: INTERNAL MEDICINE

## 2018-11-14 PROCEDURE — 4A133B3 MONITORING OF ARTERIAL PRESSURE, PULMONARY, PERCUTANEOUS APPROACH: ICD-10-PCS | Performed by: INTERNAL MEDICINE

## 2018-11-14 PROCEDURE — 86900 BLOOD TYPING SEROLOGIC ABO: CPT | Performed by: INTERNAL MEDICINE

## 2018-11-14 PROCEDURE — 40000048 ZZH STATISTIC DAILY SWAN MONITORING

## 2018-11-14 PROCEDURE — 93308 TTE F-UP OR LMTD: CPT | Mod: 26 | Performed by: INTERNAL MEDICINE

## 2018-11-14 PROCEDURE — 40000275 ZZH STATISTIC RCP TIME EA 10 MIN

## 2018-11-14 PROCEDURE — 86850 RBC ANTIBODY SCREEN: CPT | Performed by: INTERNAL MEDICINE

## 2018-11-14 PROCEDURE — 27210136 ZZH KIT CATH ARTERIAL EXT SUPPLY

## 2018-11-14 PROCEDURE — 00000146 ZZHCL STATISTIC GLUCOSE BY METER IP

## 2018-11-14 PROCEDURE — 40000196 ZZH STATISTIC RAPCV CVP MONITORING

## 2018-11-14 PROCEDURE — 25000128 H RX IP 250 OP 636: Performed by: INTERNAL MEDICINE

## 2018-11-14 PROCEDURE — 4A1239Z MONITORING OF CARDIAC OUTPUT, PERCUTANEOUS APPROACH: ICD-10-PCS | Performed by: INTERNAL MEDICINE

## 2018-11-14 PROCEDURE — 93503 INSERT/PLACE HEART CATHETER: CPT

## 2018-11-14 PROCEDURE — 36620 INSERTION CATHETER ARTERY: CPT

## 2018-11-14 PROCEDURE — 76937 US GUIDE VASCULAR ACCESS: CPT | Mod: 26 | Performed by: INTERNAL MEDICINE

## 2018-11-14 PROCEDURE — 82805 BLOOD GASES W/O2 SATURATION: CPT | Performed by: INTERNAL MEDICINE

## 2018-11-14 PROCEDURE — 80048 BASIC METABOLIC PNL TOTAL CA: CPT | Performed by: INTERNAL MEDICINE

## 2018-11-14 PROCEDURE — 93321 DOPPLER ECHO F-UP/LMTD STD: CPT | Mod: 26 | Performed by: INTERNAL MEDICINE

## 2018-11-14 PROCEDURE — 40000986 XR CHEST PORT 1 VW

## 2018-11-14 PROCEDURE — 27210140 ZZH KIT CATH SWAN VIP SUPPLY

## 2018-11-14 PROCEDURE — 80307 DRUG TEST PRSMV CHEM ANLYZR: CPT | Performed by: INTERNAL MEDICINE

## 2018-11-14 PROCEDURE — 86901 BLOOD TYPING SEROLOGIC RH(D): CPT | Performed by: INTERNAL MEDICINE

## 2018-11-14 PROCEDURE — 76000 FLUOROSCOPY <1 HR PHYS/QHP: CPT | Mod: TC

## 2018-11-14 RX ORDER — HYDRALAZINE HYDROCHLORIDE 25 MG/1
25 TABLET, FILM COATED ORAL ONCE
Status: DISCONTINUED | OUTPATIENT
Start: 2018-11-14 | End: 2018-11-14

## 2018-11-14 RX ORDER — POTASSIUM CHLORIDE 29.8 MG/ML
20 INJECTION INTRAVENOUS
Status: DISCONTINUED | OUTPATIENT
Start: 2018-11-14 | End: 2018-11-21 | Stop reason: HOSPADM

## 2018-11-14 RX ORDER — POTASSIUM CHLORIDE 7.45 MG/ML
10 INJECTION INTRAVENOUS
Status: DISCONTINUED | OUTPATIENT
Start: 2018-11-14 | End: 2018-11-21 | Stop reason: HOSPADM

## 2018-11-14 RX ORDER — FUROSEMIDE 10 MG/ML
80 INJECTION INTRAMUSCULAR; INTRAVENOUS ONCE
Status: COMPLETED | OUTPATIENT
Start: 2018-11-14 | End: 2018-11-14

## 2018-11-14 RX ORDER — HYDRALAZINE HYDROCHLORIDE 50 MG/1
50 TABLET, FILM COATED ORAL EVERY 8 HOURS SCHEDULED
Status: DISCONTINUED | OUTPATIENT
Start: 2018-11-14 | End: 2018-11-16

## 2018-11-14 RX ORDER — POTASSIUM CHLORIDE 1.5 G/1.58G
20-40 POWDER, FOR SOLUTION ORAL
Status: DISCONTINUED | OUTPATIENT
Start: 2018-11-14 | End: 2018-11-21 | Stop reason: HOSPADM

## 2018-11-14 RX ORDER — HEPARIN SODIUM 5000 [USP'U]/.5ML
5000 INJECTION, SOLUTION INTRAVENOUS; SUBCUTANEOUS EVERY 8 HOURS SCHEDULED
Status: DISCONTINUED | OUTPATIENT
Start: 2018-11-14 | End: 2018-11-16

## 2018-11-14 RX ORDER — POTASSIUM CL/LIDO/0.9 % NACL 10MEQ/0.1L
10 INTRAVENOUS SOLUTION, PIGGYBACK (ML) INTRAVENOUS
Status: DISCONTINUED | OUTPATIENT
Start: 2018-11-14 | End: 2018-11-21 | Stop reason: HOSPADM

## 2018-11-14 RX ORDER — MAGNESIUM SULFATE HEPTAHYDRATE 40 MG/ML
4 INJECTION, SOLUTION INTRAVENOUS EVERY 4 HOURS PRN
Status: DISCONTINUED | OUTPATIENT
Start: 2018-11-14 | End: 2018-11-21 | Stop reason: HOSPADM

## 2018-11-14 RX ORDER — HYDRALAZINE HYDROCHLORIDE 25 MG/1
25 TABLET, FILM COATED ORAL EVERY 8 HOURS SCHEDULED
Status: DISCONTINUED | OUTPATIENT
Start: 2018-11-14 | End: 2018-11-14

## 2018-11-14 RX ORDER — FUROSEMIDE 10 MG/ML
60 INJECTION INTRAMUSCULAR; INTRAVENOUS 2 TIMES DAILY
Status: DISCONTINUED | OUTPATIENT
Start: 2018-11-15 | End: 2018-11-15

## 2018-11-14 RX ORDER — POTASSIUM CHLORIDE 750 MG/1
40 TABLET, EXTENDED RELEASE ORAL 2 TIMES DAILY
Status: DISCONTINUED | OUTPATIENT
Start: 2018-11-14 | End: 2018-11-15

## 2018-11-14 RX ORDER — FUROSEMIDE 10 MG/ML
100 INJECTION INTRAMUSCULAR; INTRAVENOUS ONCE
Status: COMPLETED | OUTPATIENT
Start: 2018-11-14 | End: 2018-11-14

## 2018-11-14 RX ORDER — POTASSIUM CHLORIDE 750 MG/1
20-40 TABLET, EXTENDED RELEASE ORAL
Status: DISCONTINUED | OUTPATIENT
Start: 2018-11-14 | End: 2018-11-21 | Stop reason: HOSPADM

## 2018-11-14 RX ORDER — DIGOXIN 125 MCG
125 TABLET ORAL DAILY
Status: DISCONTINUED | OUTPATIENT
Start: 2018-11-14 | End: 2018-11-14

## 2018-11-14 RX ORDER — MILRINONE LACTATE 0.2 MG/ML
0.12 INJECTION, SOLUTION INTRAVENOUS CONTINUOUS
Status: DISCONTINUED | OUTPATIENT
Start: 2018-11-14 | End: 2018-11-21 | Stop reason: HOSPADM

## 2018-11-14 RX ORDER — DIGOXIN 125 MCG
125 TABLET ORAL DAILY
Status: DISCONTINUED | OUTPATIENT
Start: 2018-11-15 | End: 2018-11-16

## 2018-11-14 RX ORDER — DOBUTAMINE HCL IN DEXTROSE 5 % 500MG/250
2.5 INTRAVENOUS SOLUTION INTRAVENOUS CONTINUOUS
Status: DISCONTINUED | OUTPATIENT
Start: 2018-11-14 | End: 2018-11-14 | Stop reason: CLARIF

## 2018-11-14 RX ADMIN — ATORVASTATIN CALCIUM 10 MG: 10 TABLET, FILM COATED ORAL at 08:23

## 2018-11-14 RX ADMIN — MAGNESIUM SULFATE IN WATER 2 G: 40 INJECTION, SOLUTION INTRAVENOUS at 08:12

## 2018-11-14 RX ADMIN — DOBUTAMINE 2.5 MCG/KG/MIN: 12.5 INJECTION, SOLUTION INTRAVENOUS at 05:01

## 2018-11-14 RX ADMIN — HUMAN ALBUMIN MICROSPHERES AND PERFLUTREN 4 ML: 10; .22 INJECTION, SOLUTION INTRAVENOUS at 07:30

## 2018-11-14 RX ADMIN — THIAMINE HCL TAB 100 MG 100 MG: 100 TAB at 08:23

## 2018-11-14 RX ADMIN — POTASSIUM CHLORIDE 20 MEQ: 29.8 INJECTION, SOLUTION INTRAVENOUS at 08:12

## 2018-11-14 RX ADMIN — DIGOXIN 125 MCG: 125 TABLET ORAL at 08:23

## 2018-11-14 RX ADMIN — FUROSEMIDE 100 MG: 10 INJECTION, SOLUTION INTRAVENOUS at 01:48

## 2018-11-14 RX ADMIN — HYDRALAZINE HYDROCHLORIDE 50 MG: 50 TABLET ORAL at 22:13

## 2018-11-14 RX ADMIN — POTASSIUM CHLORIDE 40 MEQ: 750 TABLET, EXTENDED RELEASE ORAL at 15:16

## 2018-11-14 RX ADMIN — Medication 25 MG: at 04:26

## 2018-11-14 RX ADMIN — Medication 12.5 MG: at 17:47

## 2018-11-14 RX ADMIN — HYDRALAZINE HYDROCHLORIDE 25 MG: 25 TABLET ORAL at 10:36

## 2018-11-14 RX ADMIN — POTASSIUM CHLORIDE 20 MEQ: 29.8 INJECTION, SOLUTION INTRAVENOUS at 13:06

## 2018-11-14 RX ADMIN — Medication 5000 UNITS: at 19:54

## 2018-11-14 RX ADMIN — HYDRALAZINE HYDROCHLORIDE 25 MG: 25 TABLET ORAL at 14:15

## 2018-11-14 RX ADMIN — FUROSEMIDE 80 MG: 10 INJECTION, SOLUTION INTRAVENOUS at 15:16

## 2018-11-14 RX ADMIN — POTASSIUM CHLORIDE 20 MEQ: 29.8 INJECTION, SOLUTION INTRAVENOUS at 06:28

## 2018-11-14 RX ADMIN — SODIUM NITROPRUSSIDE 0.25 MCG/KG/MIN: 25 INJECTION INTRAVENOUS at 02:20

## 2018-11-14 RX ADMIN — BUPROPION HYDROCHLORIDE 150 MG: 150 TABLET, FILM COATED, EXTENDED RELEASE ORAL at 08:23

## 2018-11-14 RX ADMIN — Medication 25 MG: at 23:33

## 2018-11-14 RX ADMIN — POTASSIUM CHLORIDE 20 MEQ: 29.8 INJECTION, SOLUTION INTRAVENOUS at 18:17

## 2018-11-14 RX ADMIN — FUROSEMIDE 5 MG/HR: 10 INJECTION, SOLUTION INTRAVENOUS at 01:57

## 2018-11-14 RX ADMIN — MILRINONE LACTATE 0.12 MCG/KG/MIN: 200 INJECTION, SOLUTION INTRAVENOUS at 10:30

## 2018-11-14 RX ADMIN — ASPIRIN 81 MG: 81 TABLET, COATED ORAL at 08:23

## 2018-11-14 RX ADMIN — POTASSIUM CHLORIDE 20 MEQ: 29.8 INJECTION, SOLUTION INTRAVENOUS at 17:17

## 2018-11-14 ASSESSMENT — ACTIVITIES OF DAILY LIVING (ADL)
ADLS_ACUITY_SCORE: 9

## 2018-11-14 NOTE — PLAN OF CARE
Problem: Patient Care Overview  Goal: Plan of Care/Patient Progress Review  OT4E: Pt and family refused evaluation this pm 2/2 getting a chance to eat for the first time. Explained to family that writer would be unable to come back and family wanted to try evaluation tomorrow.

## 2018-11-14 NOTE — PROGRESS NOTES
Preliminary Device Interrogation Results.  Final physician signed paceart report to be scanned and attached.    Patient seen on 4E for evaluation and iterative programming of his [employer] single lead ICD per MD orders.  Normal ICD function.  1 NSVT episode recorded on 10/17/18 - 186 bpm, 6 sec.  Intrinsic rhythm = NSR @ 88 bpm.   = 0%.  Estimated battery longevity to LINCOLN = 12 years.  VVI 40 bpm.  VT therapy zone is programmed @ 200 bpm.  VF therapy zone is programmed at 240 bpm.    Single lead ICD

## 2018-11-14 NOTE — H&P
Cardiology    History and Physical         Date of Admission:  11/13/2018  Date of Service (when I saw the patient): 11/14/18    Assessment & Plan   Danielito Chu is a 63 year old male with a PMHx of Congestive Heart Failure with Reduced Ejection Fraction s/p ICD, HTN, DM2, SHIRLEY who presents with increasing dizziness, vomiting and low energy.     Based on patient's history and labs (low pulse pressure, low sodium and elevated Cr), patient is likely in low output cardiogenic shock. Overall, patient has many red flags for Stage D heart failure. He is not able to tolerate neurohormonal blockade and his VO2 was around 9 and VE/VCO2 slope was around 40 on CPX. I believe at this point outpatient medical therapy will not improve his symptoms. He likely needs advanced therapies which can include ionotropes, LVAD, or heart transplant.     Given his current symptoms, it will be very important to accurately define his hemodynamics. Therefore, Alsey was placed and showed RA pressure of 18, PA pressure of  42/30, PCWP 28, Cardiac Index of 1.2 and SVR of 2300. Given his hemodynamic profile, patient is in low output shock with volume overload. We will try to remove volume with continuous infusion of Lasix and increase cardiac output with nitroprusside. With these medications, I do anticipate improving his symptoms and hemodynamics. Given the likely fact that his heart failure is end-stage, the next step would be for these medications to bridge him to LVAD. However, this will require extensive discussion as patient does not want LVAD at this time.     #Low Output Cardiogenic Shock in the Setting of NICM (LVEF around 17%)   -Alsey Numbers as above show patient is cold and wet.  -Lasix 100mg Once and Lasix gtt at 10mg/hour.   -Initiate Nitroprusside for goal MAP around 65 and CI>2. If patient is not able to tolerate Nitroprusside or MAP <65, will start  at 2.5.   -Continued LVAD workup and discussions with patient about LVAD.    -Holding Lisinopril as we titrate Nitroprusside. Holding BB.  -Continue Digoxin. Will decrease dose to 125 given renal failure and obtain level.     Overall, the goal will be to bridge patient with medications to possible LVAD. If patient continues to have symptoms despite Nipride and Dobutamine, then may need to considered temporary mechanical support.     Lam Barraza   Cardiology Fellow   Pager: [phone]      Lam Barraza    Primary Care Physician   KEN WALDEN    Chief Complaint   Dizziness     History of Present Illness   Danielito Chu is a 63 year old male with a PMHx of Congestive Heart Failure with Reduced Ejection Fraction s/p ICD, HTN, DM2, SHIRLEY who presents with increasing dizziness. Patient was recently admitted to the [facility] 2 service on 10/2018 for decompensated heart failure. During that admission, patient underwent RHC which showed a RA pressure of 7, PCWP of 21, CI of 1.6. Patient was being worked up for LVAD; however, he was reluctant to undergo surgery. Patient was discharged on Torsemide 60mg BID, Captopril 6.25mg TID, Digoxin. Patient did not tolerate higher doses of ACEI or BB. Patient states his dizziness began when he was in the hospital and continued to get worse. He reported gait instability when walking to the bathroom. He reports that he has been very compliant with his diet and fluid intake and is at his perceived dry weight. He was seen in clinic today and reports that this symptoms are likely due to the medications. In addition, he reports that his energy is down and he has been vomiting. Patient's outpatient labs showed that his Na is lower around 132 and Cr has increased to around 1.5. Therefore, patient was admitted for further evaluation.     Past Medical History    I have reviewed this patient's medical history and updated it with pertinent information if needed.   Past Medical History:   Diagnosis Date     Acute systolic congestive heart failure (H) 4/5/2017      Diabetes (H)      HTN (hypertension)      Hyperlipidemia      Mitral regurgitation      Nocturnal oxygen desaturation 3/29/2018     Nonischemic cardiomyopathy (H) 4/5/2017     SHIRLEY (obstructive sleep apnea)      Pacemaker 04/07/2017    ICD 4/7/17       Past Surgical History   I have reviewed this patient's surgical history and updated it with pertinent information if needed.  Past Surgical History:   Procedure Laterality Date     IMPLANT IMPLANTABLE CARDIOVERTER DEFIBRILLATOR         Prior to Admission Medications   Prior to Admission Medications   Prescriptions Last Dose Informant Patient Reported? Taking?   Blood Glucose Monitoring Suppl (BLOOD GLUCOSE MONITOR SYSTEM) w/Device KIT   Yes No   Sig: three times a week   Potassium Chloride ER 20 MEQ TBCR 11/12/2018 at 2000  No No   Sig: Take 1 tablet (20 mEq) by mouth daily   artificial saliva (BIOTENE DRY MOUTHWASH) LIQD liquid   No No   Sig: Swish and spit 10 mLs in mouth 4 times daily as needed for dry mouth   aspirin EC 81 MG EC tablet 11/13/2018 at 085  No No   Sig: Take 1 tablet (81 mg) by mouth daily   atorvastatin (LIPITOR) 10 MG tablet 11/13/2018 at 08  No No   Sig: Take 1 tablet (10 mg) by mouth daily   buPROPion (WELLBUTRIN XL) 150 MG 24 hr tablet   No No   Sig: Take 1 tablet (150 mg) by mouth daily   Patient not taking: Reported on 11/13/2018   digoxin (LANOXIN) 250 MCG tablet 11/13/2018 at 08  No No   Sig: TAKE 1 TABLET (250 MCG) BY MOUTH DAILY   lisinopril (PRINIVIL/ZESTRIL) 5 MG tablet   No No   Sig: Take 1 tablet (5 mg) by mouth daily   metFORMIN (GLUCOPHAGE) 500 MG tablet   Yes No   Sig: Take 1 tablet (500 mg) by mouth 2 times daily (with meals)   sildenafil (VIAGRA) 50 MG tablet   No No   Sig: Take 1 tablet (50 mg) by mouth daily as needed   Patient not taking: Reported on 10/16/2018   thiamine 100 MG tablet 11/13/2018 at 08  No No   Sig: Take 1 tablet (100 mg) by mouth daily   torsemide (DEMADEX) 20 MG tablet 11/13/2018 at 08  No No   Sig: Take 60mg  in the am and 40mg in the pm   traZODone (DESYREL) 100 MG tablet 2018 at 2200  No No   Sig: TAKE 1 TABLET BY MOUTH AT BEDTIME AS NEEDED FOR SLEEP      Facility-Administered Medications: None     Allergies   No Known Allergies    Social History   I have reviewed this patient's social history and updated it with pertinent information if needed. Danielito Chu  reports that he has quit smoking. He has never used smokeless tobacco.    Family History   I have reviewed this patient's family history and updated it with pertinent information if needed.   Family History   Problem Relation Age of Onset     Heart Failure Father       at age 86     Heart Failure Paternal Uncle       at 66      Myocardial Infarction Paternal Uncle       at age 62     Coronary Artery Disease Mother       during CABG at age 41       Review of Systems   The 10 point Review of Systems is negative other than noted in the HPI or here.     Physical Exam   Temp: 97.1  F (36.2  C) Temp src: Axillary BP: 100/83   Heart Rate: 93 Resp: 17 SpO2: 100 % O2 Device: None (Room air)    Vital Signs with Ranges  Temp:  [97.1  F (36.2  C)-97.8  F (36.6  C)] 97.1  F (36.2  C)  Pulse:  [] 100  Heart Rate:  [88-96] 93  Resp:  [16-17] 17  BP: (100-117)/(69-83) 100/83  SpO2:  [96 %-100 %] 100 %  181 lbs 10.54 oz    Gen: No acute distress   HEENT: NC/AT   CV: RRR, CVP is around 15   Pulm: Equal Chest Rise   Abdomen: s/nt/nd   Ext: No significant edema   Neuro: No focal defects     Data   Data reviewed today:  I personally reviewed the EKG tracing showing sinus rhythm with LAE and poor R wave progression .    Recent Labs  Lab 18  1046   WBC 6.4  --    HGB 17.1  --    MCV 97  --      --    * 132*   POTASSIUM 4.1 4.5   CHLORIDE 97 97   CO2 28 28   BUN 31* 31*   CR 1.53* 1.57*   ANIONGAP 8 7   ASHLEE 9.4 9.4   * 98   ALBUMIN 4.1  --    PROTTOTAL 8.4  --    BILITOTAL 2.0*  --    ALKPHOS 92  --    ALT 17   --    AST 17  --        Recent Results (from the past 24 hour(s))   XR Fluoro Port Time 0/1 Hour    Narrative    This exam was marked as non-reportable because it will not be read by a   radiologist or a [facility] non-radiologist provider.             XR Chest Port 1 View    Narrative    1. The right IJ [place]-Soy catheter tip projects over the right  pulmonary artery.  2. Stable cardiomegaly without focal airspace opacities.         Thank you for allowing me to care for this patient. This has been discussed with the attending physician.    Lam Barraza PGY-5  Cardiology Fellow   Pager: [phone]

## 2018-11-14 NOTE — PLAN OF CARE
"Problem: Patient Care Overview  Goal: Plan of Care/Patient Progress Review  Pt is alert and oriented x4. Vital signs stable, O2 saturations WDL on room air. Staff noticed a murmur with heart sounds; MD notified. Pt reports SOB, dizziness, \"balance issues\" but denies CP. A SWAN was placed in and pt's hemodynamic numbers were: SVR 2300 SvO2 53, CO 2.5, CI 1.2, PA 42/30 and CVP 18. Pt was placed on a nipride and lasix gtts with a one time dose of Lasix. Pt's hemodynamic numbers did not improve as CI was 1.1 after 2 hours being on the nipride and Lasix gtt; also, pt's BP got soft with SBP in the 80s. Pt reported feeling no significant change compared to admission with SOB and  dizziness. Pt was then placed on a dobutamine gtt and a arterial line was inserted to further manage BP and pt's response to vasoactive drugs. After 45 minutes being on the dobutamine gtt, there was some improvement in pt's BP with overall  improvement in hemodynamic numbers: SVR 1300s, CVP 10, PA 44/24, CO 4, CI 2, SvO2 68. Nipride gtt is now at 1.25 (titratable to 65 MAP), dobutamine 2.5 mcg/kg/min and Lasix 10 mg/hr. Pt's urine output is 600 ml in the last 8 hours, pt was placed on O2 2L for desaturations to the tony 80s (pt had trazodone). Pt's feet are warm now; were cool upon arrival and pulses are all palpable. Will continue to monitor.       "

## 2018-11-14 NOTE — MR AVS SNAPSHOT
After Visit Summary   11/14/2018    Danielito Chu    MRN: [id number]           Patient Information     Date Of Birth          1955        Visit Information        Provider Department      11/14/2018 6:00 AM 1, Uc Cv Device [facility]        Today's Diagnoses     Nonischemic cardiomyopathy (H)    -  1       Follow-ups after your visit        Your next 10 appointments already scheduled     Nov 15, 2018 11:00 AM CST   TELEMEDICINE with Stan Lyle RPH   [facility] Medication Therapy Management ([facility])    [address] [place] [address]  3rd [facility] [id number] [phone]           Note: this is not an onsite visit; there is no need to come to the facility.            Nov 26, 2018  9:45 AM CST   Lab with UC LAB   [facility] ([facility])    [address] [place] [id number] [phone]            Nov 26, 2018 10:30 AM CST   Implanted Defibulator with Uc Cv Device [address] [place] [address] ([facility])    [address] [place] [address]  [id number]               Nov 26, 2018 11:00 AM CST   (Arrive by 10:45 AM)   CORE RETURN with Minoo Lopez NP   [facility] ([facility])    [address] [place] [address]  [address] [place] [id number] [phone]            Jan 03, 2019  9:00 AM CST   Lab with UC LAB   [facility] ([facility])    [address] [place] [id number] [phone]            Jan 03, 2019  9:30 AM CST   (Arrive by 9:15 AM)   RETURN HEART FAILURE with Lorena Watkins MD   [facility] ([facility])    [address] [place] [address]  [address] [place] [id number] [phone]            Jan 21, 2019  1:00 PM CST   (Arrive by 12:45 PM)   Implanted Defibulator with Uc Cv Device 1   [facility] ([facility]  [place])    [address]  [id number]                 Future tests that were ordered for you today     Open Standing Orders        Priority Remaining Interval Expires Ordered    XR Chest Port 1 View Routine 7/7 DAILY IMAGING  11/14/2018    Cystatin C with GFR Routine 1/1 AM DRAW  11/14/2018    Potassium Routine 99/100 CONDITIONAL (SPECIFY)  11/14/2018    Magnesium Routine 99/100 CONDITIONAL (SPECIFY)  11/14/2018    Blood gas venous and oxyhgb Timed 89/95 EVERY 4 HOURS  11/14/2018    INR Routine 1/1 AM DRAW  11/14/2018    Basic metabolic panel Timed 18/20 EVERY 12 HOURS  11/14/2018    Digoxin level Routine 1/1 AM DRAW  11/14/2018    Magnesium Routine 15/16 DAILY  11/14/2018    Uric acid Routine 1/1 CONDITIONAL X 1  11/14/2018    EKG 12-lead STAT 100/100 CONDITIONAL (SPECIFY)  11/13/2018    Smoking Cessation Program IP Consult Routine 1/1 CONDITIONAL X 1  11/13/2018    Oxygen: Nasal cannula Routine [address] PRN  11/13/2018          Open Future Orders        Priority Expected Expires Ordered    Basic metabolic panel Routine  11/13/2019 11/13/2018            Who to contact     Please call your clinic at No information on file. to:    Ask questions about your health    Make or cancel appointments    Discuss your medicines    Learn about your test results    Speak to your doctor            Additional Information About Your Visit        Care EveryWhere ID     This is your Care EveryWhere ID. This could be used by other organizations to access your [facility] medical records  [id number]         Blood Pressure from Last 3 Encounters:   11/14/18 109/61   11/13/18 102/78   11/13/18 102/78    Weight from Last 3 Encounters:   11/13/18 82.4 kg (181 lb 10.5 oz)   11/13/18 84.8 kg (187 lb)   11/13/18 84.8 kg (187 lb)              We Performed the Following     ICD DEVICE PROGRAMMING EVAL, SINGLE LEAD ICD          Today's Medication Changes      Notice     This visit is during an admission. Changes to the med  list made in this visit will be reflected in the After Visit Summary of the admission.             Primary Care Provider Office Phone # Fax #    Bashir Hines [phone]       [address] [address]        Equal Access to Services     ALEX RODGERS : Hadii andres fuenteso Sojeanieali, waaxda luqadaha, qaybta kaalmada adeegyada, aaron burks hayeliza reeserupagold pearson. So [facility] [phone].    ATENCIÓN: Si habla español, tiene a stokes disposición servicios gratuitos de asistencia lingüística. Llame al [phone].    We comply with applicable federal civil rights laws and [place] laws. We do not discriminate on the basis of race, color, national origin, age, disability, sex, sexual orientation, or gender identity.            Thank you!     Thank you for choosing [facility]  for your care. Our goal is always to provide you with excellent care. Hearing back from our patients is one way we can continue to improve our services. Please take a few minutes to complete the written survey that you may receive in the mail after your visit with us. Thank you!             Your Updated Medication List - Protect others around you: Learn how to safely use, store and throw away your medicines at www.disposemymeds.org.      Notice     This visit is during an admission. Changes to the med list made in this visit will be reflected in the After Visit Summary of the admission.

## 2018-11-14 NOTE — PROCEDURES
Procedure/Surgery Information   [facility], [place]    Bedside Procedure Note  Date of Service (when I performed the procedure): 11/14/2018    Danielito Chu is a 63 year old male patient.  No diagnosis found.  Past Medical History:   Diagnosis Date     Acute systolic congestive heart failure (H) 4/5/2017     Diabetes (H)      HTN (hypertension)      Hyperlipidemia      Mitral regurgitation      Nocturnal oxygen desaturation 3/29/2018     Nonischemic cardiomyopathy (H) 4/5/2017     SHIRLEY (obstructive sleep apnea)      Pacemaker 04/07/2017    ICD 4/7/17     Temp: 98.5  F (36.9  C) Temp src: Oral BP: (!) 78/60   Heart Rate: 80 Resp: 18 SpO2: 97 % O2 Device: None (Room air)      Insert arterial line  Date/Time: 11/14/2018 5:49 AM  Performed by: LAM BARRAZA  Authorized by: LAM BARRAZA   Consent: Verbal consent obtained. Written consent obtained.  Consent given by: patient  Patient understanding: patient states understanding of the procedure being performed  Patient consent: the patient's understanding of the procedure matches consent given  Procedure consent: procedure consent matches procedure scheduled  Relevant documents: relevant documents present and verified  Test results: test results available and properly labeled  Site marked: the operative site was marked  Imaging studies: imaging studies available  Patient identity confirmed: verbally with patient  Indications: hemodynamic monitoring  Location: left radial    Sedation:  Patient sedated: no  Number of attempts: 1  Post-procedure: line sutured           Lam Barraza

## 2018-11-14 NOTE — PLAN OF CARE
"Problem: Cardiac: Heart Failure (Adult)  Goal: Signs and Symptoms of Listed Potential Problems Will be Absent, Minimized or Managed (Cardiac: Heart Failure)  Signs and symptoms of listed potential problems will be absent, minimized or managed by discharge/transition of care (reference Cardiac: Heart Failure (Adult) CPG).  Outcome: No Change  Admission    Diagnosis: Heart Failure  Admitted from: clinic  Via: ambulation  Accompanied by: self, sister dropped him off at front door  Belongings: Placed in closet; valuables sent home with family, declined sending any items to security.  Admission Profile: Complete  Teaching: orientation to unit, call don't fall, use of console, meal times, visiting hours, when to call for the RN (angina/sob/dizziness, etc.), and enforced importance of safety   Access: PIV  Telemetry: Placed on patient  Height/Weight: Complete    D/I: Monitor shows SR 80-90s with frequent PVCs. C/O dizziness, headache, ears ringing d/t full head, balance issues. States it is probably because of his medication changes. States does not know why he has been admitted. \"They want me to get an LVAD but I've told them that I don't want to do it yet.\" Denies chest pain or shortness of breath. No edema noted. See flowsheets for assessments and additional data.  A: Stable HF.  P: Monitor I/O. Waiting for transfer orders for swan placement. Continue current cares and notify providers with questions and concerns.    11/13/18 3589   Cardiac: Heart Failure   Problems Assessed (Heart Failure) all   Problems Present (Heart Failure) cardiac pump dysfunction;dysrhythmia/arrhythmia;situational response       Report given to 4E. Will transfer for swan placement. Patient and sister aware.   "

## 2018-11-14 NOTE — PROGRESS NOTES
CLINICAL NUTRITION SERVICES - BRIEF NOTE    Received provider consult for nutrition education for 2 g Sodium education. Pt was seen yesterday in clinic by RD, received education on low sodium diet as well and pre-transplant vs pre-LVAD education.    RD will follow per LOS protocol or if re-consulted.     Marisela Cantu RD, LD, [facility]  CVICU Dietitian  Pager: [phone]

## 2018-11-14 NOTE — PROGRESS NOTES
Cardiology Progress Note  Danielito Chu MRN: [id number]  Age: 63 year old, : 18            Changes Today:     - Stop diuretic gtt, trnasition to intermittent dosing. Will titrate to hemodynamics.   - Start Hydralazine and wean nipride   - Dental eval pending for vad work up and Cystatin C - both ordered. Appreciate dental assistance. No other work up pending. Discussed with VAD coordinator.    Dispo: Ultimately patient's repeat failure on medical therapy on OP basis is concerning . Inotropes have optimized his CI while inpatient - this is new. Ultimately if patient continues to decline LVAD this admission, will likely require OP inotropes. (We are most likely capable of pursuing Home inotropes for patient. This was explored purely for logistic planning as patient [place] in [place] area of [place] . NOT discussed with patient).   Pending ongoing hemodynamic optimization and discussions with all members of team and primary cardiologist.         Assessment and Plan:     Danielito Chu is a 63 year old male with a PMHx of Congestive Heart Failure with Reduced Ejection Fraction s/p ICD, HTN, DM2, SHIRLEY who presents with increasing dizziness, vomiting and fatigue concerning for low output cardiogenic shock.      #Low output Cardiogenic Shock   # NICM, HFpEF, decompensated  #S/P ICD ([employer] implanted 2017)  Primary Cardiologist: Dr.Rebecca Watkins   Opening [place] numbers 18 - RA-18, PA-42/30, Thu-31, PCWP-28, SvO2 - 53, CI - 1.2, CO - 2.5, Hgb - 15.7, BSA-2msq, SVR - 2304   Recently admitted, attempted medical optimization as patient reluctant to undergo home inotropes or LVAD during past admission. However, now readmitted with significant symptoms including dizziness,   Failed OP management on Captopril 6.25mg PO TID (patient reportedly attributing his symptoms of dizziness to such and perhaps not taking), and Torsemide 60mg BID (Sister giving 40mg  BID for any days, 3-4 days prior to admission taking 60mg BID and decreased to  60/40mg at clinic 11/13)    Now, optimizing index on Milrinone, weaning nipride with addition of Hydralazine and pursuing ongoing diuresis to euvolemia based on swan hemodynamics.   ---  - Diuretic: Lasix 80mg IV x1   - Electrolyte: Scheduled 40meq BID  - ACE: HOLD  - AFTERLOAD: Hydralazine 25Q8h   - ROSA Ag: Discontinued in setting of hyperkalemia in prior admissions.  - BB: HOLD. On inotropes  - Continue Digoxin 250mcg Qday  - ASA: 81 mg Qday   - Statin: Atorvastatin 10mg Qday   - ICD: [employer] implanted 4/7/18   - Strict 1.5-2L fluid restriction, Low 1gm Na diet   - LVAD work up ongoing     # NSVT : Hx of such, no events during prior admission.   - Repeat device interrogation      # Depression: Suspect patient is clinically depressed, poor appetitite, low energy and apathy all present during prior admission. Many of above may be in the setting of low output however this is also concerning for clinical depression. Started on Wellbutrin during prior admission however patient not taking any longer. During prior admission patient     ACCESS: Right internal jugular [place], PIV   FEN: 2gm Na, 2L fluid restriction    PPX: Heparin 5000 subcutaneous Q8h    CODE: Full      Patient was discussed with staff attending, Dr. Giancarlo Rehman  PGY-2 Internal Medicine  Cardiology Service          Subjective     Patient admitted overnight   Diuresed with lasix 100 and 10mg/h with improvement in RA pressures.           Objective     Vitals:  Temp:  [97.1  F (36.2  C)-98.5  F (36.9  C)] 97.5  F (36.4  C)  Pulse:  [] 100  Heart Rate:  [70-96] 89  Resp:  [15-18] 15  BP: ()/(56-83) 109/61  MAP:  [57 mmHg-83 mmHg] 70 mmHg  Arterial Line BP: ()/(43-70) 104/43  SpO2:  [93 %-100 %] 99 %     Vitals:    11/13/18 2029 11/13/18 2334   Weight: 84.8 kg (187 lb) 82.4 kg (181 lb 10.5 oz)       Gen: AA&Ox3, no acute distress  however very fatigued, laying completely flat in bed comfortably.   PULM/THORAX: Clear to auscultation bilaterally, no rales/stridor/wheezes  CV:RRR, S1 and S2 appreciated, no extra heart sounds, murmurs or rub auscultated. [place] in place.   ABD: soft, nt and nd   EXT: warm to palpation in AM, no significant LE edema             Data:      Labs reviewed. Pertinent for :  Na - 134, K - 3.6, Cr - 1.23   Lactate 1.7 - 1.3 - 0.9 - 0.6        Medications     Medications    aspirin  81 mg Oral Daily     atorvastatin  10 mg Oral Daily     buPROPion  150 mg Oral Daily     [START ON 11/15/2018] digoxin  125 mcg Oral Daily     hydrALAZINE  25 mg Oral Q8H VITALY     thiamine  100 mg Oral Daily     traZODone  25 mg Oral At Bedtime       - MEDICATION INSTRUCTIONS -       milrinone 0.125 mcg/kg/min (11/14/18 1200)     nitroPRUsside (NIPRIDE) IV infusion ADULT/PEDS GREATER than or EQUAL to 45 kg std conc Stopped (11/14/18 1200)     Reason beta blocker order not selected

## 2018-11-14 NOTE — PROGRESS NOTES
Inpatient Procedure Note     Procedure: PA catheter placement  Indication: Hemodynamic monitoring  Diagnosis: Cardiogenic SHock      After informing the benefits and risks, an informed consent was obtained. A time out and site verification were done prior to initiation of the procedure. The neck was prepped and draped in the usual sterile fashion and infiltrated with lidocaine under ultrasound guidance. A 9 Fr sheath was placed in the right internal jugular vein using modified Seldinger technique ultrasound guidance. A balloon-tipped Cropwell-Soy catheter was advanced into the right atrium, right ventricle, pulmonary artery and pulmonary capillary wedge position with guidance of pressure waveform and Xray. The balloon was deflated and the Cropwell-Soy catheter was left in the pulmonary artery. RA/PA/PCW pressures were obtained. At time of procedure completion, all the ports aspirated and flushed properly. The patient tolerated the procedure well without any immediate complications. Cropwell locked at 60 cm.     Complications: None   Blood loss: Minimal blood loss     BSA 1.753   HR 93  bpm  /83 (MAP of 90)   RA 18  PA 42/30  PCWP 28     Hemoglobin 17.1     MVO2: 53%     Cardiac Index: 1.2   SVR around 2300      Lam Barraza   Cardiology Fellow   Pager: [phone]

## 2018-11-14 NOTE — CONSULTS
Social Work Services Progress Note    Hospital Day: 2  Date of Initial Social Work Evaluation:  10/19/18 (LVAD Assessment)  Collaborated with:  Pt, CV Surgeon    Data:  A consult was placed for completion of health care directive (HCD). Per chart, Pt does not currently have one on file.    Intervention:  Writer met with Pt at bedside. Upon entering, he was in discussion with surgeon about LVAD. Writer was invited to stay and provided support as Pt became emotional during the discussion. He expressed concern about placing a burden on his family should he choose to proceed with surgery. His sister is visiting from [place] and he states that she has her own health concerns. He has an older brother with health concerns who lives nearby. He also has a younger brother. Pt's partner is currently in [place], [place]. Working and going to school.     Writer offered to discuss HCD after Pt was finished speaking with the surgeon. He agreed and Writer provided blank copies of both the short and long forms of [employer] [place]. Writer explained the forms and Pt requested that they be left so that he could discuss them with his sister whom he expects to be visiting shortly. Writer provided contact information and informed him that he should contact Writer prior to signing the document so that a notary can be requested. Pt indicated his understanding. He stated his intention to name his sister, Fani, as his agent.    Assessment:  Pt is emotional as he considers the two options available for treatment.    Plan:    Anticipated Disposition:  pending further workup    Barriers to d/c plan:  Medical clearance, treatment plan    Follow Up:  SW will follow-up with Pt for completion of his HCD when he is ready. SW will remain available for ongoing support and other needs as they arise.      Evi Vasquez, [facility]  ICU   Pager: [phone]

## 2018-11-14 NOTE — CONSULTS
"Dental Service Consultation        Danielito Chu MRN# [id number]   YOB: 1955 Age: 63 year old   Date of Admission: 2018     Reason for consult: I was asked by MD Omaira to evaluate this patient for dental workup prior to LVAD.           Assessment and Plan:   Assessment:  #5 (UL 1st premolar) facial fracture with no caries present.  #6-F (UR canine) class V caries present.  No apical or pulpal pathology appreciated clinically or radiographically.  Percussion vitality test perform on all teeth and all respond WNL.    Plan: Patient is clear for LVAD procedure from dental perspective.  No acute dental needs at this time which would compromise LVAD prognosis.             Chief Complaint:   LVAD dental evaluation    Patient reports infrequent dental visits.  \"Only go when something is wrong or hurts.\"         History of Present Illness:   This patient is a 63 year old male admitted to [address] for possible LVAD placement.              Past Medical History:     Past Medical History:   Diagnosis Date     Acute systolic congestive heart failure (H) 2017     Diabetes (H)      HTN (hypertension)      Hyperlipidemia      Mitral regurgitation      Nocturnal oxygen desaturation 3/29/2018     Nonischemic cardiomyopathy (H) 2017     SHIRLEY (obstructive sleep apnea)      Pacemaker 2017    ICD 17             Past Surgical History:     Past Surgical History:   Procedure Laterality Date     IMPLANT IMPLANTABLE CARDIOVERTER DEFIBRILLATOR                 Social History:     Social History   Substance Use Topics     Smoking status: Former Smoker     Smokeless tobacco: Never Used      Comment: quit 3/2017     Alcohol use Not on file      Comment: social             Family History:     Family History   Problem Relation Age of Onset     Heart Failure Father       at age 86     Heart Failure Paternal Uncle       at 66      Myocardial Infarction Paternal Uncle       at age 62     " Coronary Artery Disease Mother       during CABG at age 41             Immunizations:     Immunization History   Administered Date(s) Administered     Influenza (High Dose) 3 valent vaccine 10/19/2017, 10/16/2018     Pneumococcal 23 valent 2017             Allergies:   No Known Allergies          Medications:     Current Facility-Administered Medications Ordered in Epic   Medication Dose Route Frequency Last Rate Last Dose     artificial saliva (BIOTENE DRY MOUTHWASH) liquid 10 mL  10 mL Swish & Spit 4x Daily PRN         aspirin EC tablet 81 mg  81 mg Oral Daily   81 mg at 18 0823     atorvastatin (LIPITOR) tablet 10 mg  10 mg Oral Daily   10 mg at 18 0823     buPROPion (WELLBUTRIN XL) 24 hr tablet 150 mg  150 mg Oral Daily   150 mg at 18 0823     Continuing ACE inhibitor/ARB/ARNI from home medication list OR ACE inhibitor/ARB/ARNI order already placed during this visit   Does not apply DOES NOT GO TO MAR         [START ON 11/15/2018] digoxin (LANOXIN) tablet 125 mcg  125 mcg Oral Daily         [START ON 11/15/2018] furosemide (LASIX) injection 60 mg  60 mg Intravenous BID         heparin sodium injection 5,000 Units  5,000 Units Subcutaneous Q8H VITALY         HOLD nitroGLYcerin IF   Does not apply HOLD         hydrALAZINE (APRESOLINE) half-tab 37.5 mg  37.5 mg Oral Q8H VITALY         magnesium sulfate 2 g in NS intermittent infusion ([employer]ar[employer]ium or FV Cmpd)  2 g Intravenous Daily PRN   2 g at 18 0812     magnesium sulfate 4 g in 100 mL sterile water (premade)  4 g Intravenous Q4H PRN         milrinone (PRIMACOR) infusion 200 mcg/mL PREMIX  0.125 mcg/kg/min (Dosing Weight) Intravenous Continuous 3.1 mL/hr at 18 1800 0.125 mcg/kg/min at 18 1800     nitroPRUsside (NIPRIDE) 50 mg, sodium thiosulfate 500 mg in D5W 125 mL IV infusion (conc: 0.4mg/mL)  0.25-5 mcg/kg/min Intravenous Continuous   Stopped at 18 1200     potassium chloride (KLOR-CON) Packet 20-40 mEq  20-40 mEq  Oral or Feeding Tube Q2H PRN         potassium chloride 10 mEq in 100 mL intermittent infusion with 10 mg lidocaine  10 mEq Intravenous Q1H PRN         potassium chloride 10 mEq in 100 mL sterile water intermittent infusion (premix)  10 mEq Intravenous Q1H PRN         potassium chloride 20 mEq in 50 mL intermittent infusion  20 mEq Intravenous Q1H PRN   20 mEq at 11/14/18 1717     potassium chloride SA (K-DUR/KLOR-CON M) CR tablet 20-40 mEq  20-40 mEq Oral Q2H PRN         potassium chloride SA (K-DUR/KLOR-CON M) CR tablet 40 mEq  40 mEq Oral BID   40 mEq at 11/14/18 1516     Reason beta blocker order not selected   Does not apply DOES NOT GO TO MAR         thiamine tablet 100 mg  100 mg Oral Daily   100 mg at 11/14/18 0823     traZODone (DESYREL) half-tab 25 mg  25 mg Oral At Bedtime   25 mg at 11/14/18 0426     No current Epic-ordered outpatient prescriptions on file.             Review of Systems:   The 10 point Review of Systems is negative other than noted in the HPI            Physical Exam:   Vitals were reviewed  Temp: 97.6  F (36.4  C) Temp src: Axillary BP: 109/61   Heart Rate: 85 Resp: 18 SpO2: 98 % O2 Device: None (Room air)      Head and neck exam: WNL    Intraoral exam: #5 (UL 1st premolar) facial fracture with no caries present.  #6-F (UR canine) class V caries present.  No apical or pulpal pathology appreciated clinically or radiographically.  Percussion vitality test perform on all teeth and all respond WNL.         Data:   Radiographic interpretation: Dental CT taken on 11/14/2018  Osseous pathology: creastal bone recession in edentulous area  Pulpal Pathology: none  Periodontal Pathology: none  Caries: #5-B fracture, #6-F class V caries  Odontogenic pathology: none    Jason Nunes DDS  PGY2  Pager: [phone]

## 2018-11-15 ENCOUNTER — APPOINTMENT (OUTPATIENT)
Dept: PHYSICAL THERAPY | Facility: CLINIC | Age: 63
DRG: 291 | End: 2018-11-15
Attending: INTERNAL MEDICINE
Payer: COMMERCIAL

## 2018-11-15 ENCOUNTER — APPOINTMENT (OUTPATIENT)
Dept: GENERAL RADIOLOGY | Facility: CLINIC | Age: 63
DRG: 291 | End: 2018-11-15
Attending: INTERNAL MEDICINE
Payer: COMMERCIAL

## 2018-11-15 LAB
ANION GAP SERPL CALCULATED.3IONS-SCNC: 10 MMOL/L (ref 3–14)
ANION GAP SERPL CALCULATED.3IONS-SCNC: 9 MMOL/L (ref 3–14)
BASE DEFICIT BLDV-SCNC: 1.5 MMOL/L
BASE DEFICIT BLDV-SCNC: 3 MMOL/L
BASE DEFICIT BLDV-SCNC: 3.3 MMOL/L
BASE EXCESS BLDV CALC-SCNC: 0.6 MMOL/L
BASE EXCESS BLDV CALC-SCNC: 1.5 MMOL/L
BASE EXCESS BLDV CALC-SCNC: 2 MMOL/L
BASE EXCESS BLDV CALC-SCNC: 2.2 MMOL/L
BUN SERPL-MCNC: 18 MG/DL (ref 7–30)
BUN SERPL-MCNC: 21 MG/DL (ref 7–30)
CALCIUM SERPL-MCNC: 8.1 MG/DL (ref 8.5–10.1)
CALCIUM SERPL-MCNC: 8.7 MG/DL (ref 8.5–10.1)
CHLORIDE SERPL-SCNC: 101 MMOL/L (ref 94–109)
CHLORIDE SERPL-SCNC: 104 MMOL/L (ref 94–109)
CO2 SERPL-SCNC: 22 MMOL/L (ref 20–32)
CO2 SERPL-SCNC: 25 MMOL/L (ref 20–32)
CREAT SERPL-MCNC: 1.01 MG/DL (ref 0.66–1.25)
CREAT SERPL-MCNC: 1.09 MG/DL (ref 0.66–1.25)
DIGOXIN SERPL-MCNC: 1.5 UG/L (ref 0.5–2)
ERYTHROCYTE [DISTWIDTH] IN BLOOD BY AUTOMATED COUNT: 16.2 % (ref 10–15)
GFR SERPL CREATININE-BSD FRML MDRD: 68 ML/MIN/1.7M2
GFR SERPL CREATININE-BSD FRML MDRD: 75 ML/MIN/1.7M2
GLUCOSE BLDC GLUCOMTR-MCNC: 122 MG/DL (ref 70–99)
GLUCOSE SERPL-MCNC: 102 MG/DL (ref 70–99)
GLUCOSE SERPL-MCNC: 146 MG/DL (ref 70–99)
HCO3 BLDV-SCNC: 20 MMOL/L (ref 21–28)
HCO3 BLDV-SCNC: 20 MMOL/L (ref 21–28)
HCO3 BLDV-SCNC: 21 MMOL/L (ref 21–28)
HCO3 BLDV-SCNC: 25 MMOL/L (ref 21–28)
HCO3 BLDV-SCNC: 26 MMOL/L (ref 21–28)
HCT VFR BLD AUTO: 49.1 % (ref 40–53)
HGB BLD-MCNC: 16.2 G/DL (ref 13.3–17.7)
INR PPP: 1.52 (ref 0.86–1.14)
LACTATE BLD-SCNC: 1.2 MMOL/L (ref 0.7–2)
MAGNESIUM SERPL-MCNC: 2.5 MG/DL (ref 1.6–2.3)
MCH RBC QN AUTO: 31.6 PG (ref 26.5–33)
MCHC RBC AUTO-ENTMCNC: 33 G/DL (ref 31.5–36.5)
MCV RBC AUTO: 96 FL (ref 78–100)
O2/TOTAL GAS SETTING VFR VENT: 21 %
O2/TOTAL GAS SETTING VFR VENT: ABNORMAL %
OXYHGB MFR BLDV: 58 %
OXYHGB MFR BLDV: 60 %
OXYHGB MFR BLDV: 65 %
OXYHGB MFR BLDV: 65 %
OXYHGB MFR BLDV: 67 %
OXYHGB MFR BLDV: 70 %
OXYHGB MFR BLDV: 71 %
PCO2 BLDV: 29 MM HG (ref 40–50)
PCO2 BLDV: 30 MM HG (ref 40–50)
PCO2 BLDV: 31 MM HG (ref 40–50)
PCO2 BLDV: 36 MM HG (ref 40–50)
PCO2 BLDV: 37 MM HG (ref 40–50)
PCO2 BLDV: 38 MM HG (ref 40–50)
PCO2 BLDV: 39 MM HG (ref 40–50)
PH BLDV: 7.43 PH (ref 7.32–7.43)
PH BLDV: 7.44 PH (ref 7.32–7.43)
PH BLDV: 7.45 PH (ref 7.32–7.43)
PH BLDV: 7.45 PH (ref 7.32–7.43)
PH BLDV: 7.46 PH (ref 7.32–7.43)
PLATELET # BLD AUTO: 144 10E9/L (ref 150–450)
PO2 BLDV: 33 MM HG (ref 25–47)
PO2 BLDV: 34 MM HG (ref 25–47)
PO2 BLDV: 35 MM HG (ref 25–47)
PO2 BLDV: 36 MM HG (ref 25–47)
PO2 BLDV: 38 MM HG (ref 25–47)
PO2 BLDV: 38 MM HG (ref 25–47)
PO2 BLDV: 44 MM HG (ref 25–47)
POTASSIUM SERPL-SCNC: 4.1 MMOL/L (ref 3.4–5.3)
POTASSIUM SERPL-SCNC: 4.3 MMOL/L (ref 3.4–5.3)
RBC # BLD AUTO: 5.13 10E12/L (ref 4.4–5.9)
SODIUM SERPL-SCNC: 135 MMOL/L (ref 133–144)
SODIUM SERPL-SCNC: 136 MMOL/L (ref 133–144)
WBC # BLD AUTO: 7.7 10E9/L (ref 4–11)

## 2018-11-15 PROCEDURE — 80048 BASIC METABOLIC PNL TOTAL CA: CPT | Performed by: INTERNAL MEDICINE

## 2018-11-15 PROCEDURE — 25000125 ZZHC RX 250: Performed by: STUDENT IN AN ORGANIZED HEALTH CARE EDUCATION/TRAINING PROGRAM

## 2018-11-15 PROCEDURE — 99233 SBSQ HOSP IP/OBS HIGH 50: CPT | Mod: GC | Performed by: INTERNAL MEDICINE

## 2018-11-15 PROCEDURE — 97530 THERAPEUTIC ACTIVITIES: CPT | Mod: GP | Performed by: PHYSICAL THERAPIST

## 2018-11-15 PROCEDURE — 20000004 ZZH R&B ICU UMMC

## 2018-11-15 PROCEDURE — 83735 ASSAY OF MAGNESIUM: CPT | Performed by: INTERNAL MEDICINE

## 2018-11-15 PROCEDURE — 25000132 ZZH RX MED GY IP 250 OP 250 PS 637: Performed by: INTERNAL MEDICINE

## 2018-11-15 PROCEDURE — 25000132 ZZH RX MED GY IP 250 OP 250 PS 637: Performed by: STUDENT IN AN ORGANIZED HEALTH CARE EDUCATION/TRAINING PROGRAM

## 2018-11-15 PROCEDURE — 25000128 H RX IP 250 OP 636: Performed by: STUDENT IN AN ORGANIZED HEALTH CARE EDUCATION/TRAINING PROGRAM

## 2018-11-15 PROCEDURE — 71045 X-RAY EXAM CHEST 1 VIEW: CPT

## 2018-11-15 PROCEDURE — 40000193 ZZH STATISTIC PT WARD VISIT: Performed by: PHYSICAL THERAPIST

## 2018-11-15 PROCEDURE — 85610 PROTHROMBIN TIME: CPT | Performed by: INTERNAL MEDICINE

## 2018-11-15 PROCEDURE — 40000986 XR CHEST PORT 1 VW

## 2018-11-15 PROCEDURE — 40000048 ZZH STATISTIC DAILY SWAN MONITORING

## 2018-11-15 PROCEDURE — 82805 BLOOD GASES W/O2 SATURATION: CPT | Performed by: INTERNAL MEDICINE

## 2018-11-15 PROCEDURE — 85027 COMPLETE CBC AUTOMATED: CPT | Performed by: INTERNAL MEDICINE

## 2018-11-15 PROCEDURE — 82610 CYSTATIN C: CPT | Performed by: INTERNAL MEDICINE

## 2018-11-15 PROCEDURE — 83605 ASSAY OF LACTIC ACID: CPT | Performed by: INTERNAL MEDICINE

## 2018-11-15 PROCEDURE — 97161 PT EVAL LOW COMPLEX 20 MIN: CPT | Mod: GP | Performed by: PHYSICAL THERAPIST

## 2018-11-15 PROCEDURE — 80162 ASSAY OF DIGOXIN TOTAL: CPT | Performed by: INTERNAL MEDICINE

## 2018-11-15 RX ORDER — POTASSIUM CHLORIDE 750 MG/1
40 TABLET, EXTENDED RELEASE ORAL 2 TIMES DAILY
Status: DISCONTINUED | OUTPATIENT
Start: 2018-11-15 | End: 2018-11-21 | Stop reason: HOSPADM

## 2018-11-15 RX ORDER — POLYETHYLENE GLYCOL 3350 17 G/17G
17 POWDER, FOR SOLUTION ORAL DAILY PRN
Status: DISCONTINUED | OUTPATIENT
Start: 2018-11-15 | End: 2018-11-21 | Stop reason: HOSPADM

## 2018-11-15 RX ORDER — PREDNISONE 10 MG/1
10 TABLET ORAL DAILY
Status: DISCONTINUED | OUTPATIENT
Start: 2018-11-22 | End: 2018-11-21 | Stop reason: HOSPADM

## 2018-11-15 RX ORDER — ISOSORBIDE DINITRATE 10 MG/1
10 TABLET ORAL 3 TIMES DAILY
Status: DISCONTINUED | OUTPATIENT
Start: 2018-11-15 | End: 2018-11-17

## 2018-11-15 RX ORDER — PREDNISONE 20 MG/1
20 TABLET ORAL DAILY
Status: COMPLETED | OUTPATIENT
Start: 2018-11-20 | End: 2018-11-21

## 2018-11-15 RX ORDER — FUROSEMIDE 10 MG/ML
80 INJECTION INTRAMUSCULAR; INTRAVENOUS 2 TIMES DAILY
Status: DISCONTINUED | OUTPATIENT
Start: 2018-11-15 | End: 2018-11-17

## 2018-11-15 RX ORDER — AMOXICILLIN 250 MG
1 CAPSULE ORAL AT BEDTIME
Status: DISCONTINUED | OUTPATIENT
Start: 2018-11-15 | End: 2018-11-21 | Stop reason: HOSPADM

## 2018-11-15 RX ADMIN — ATORVASTATIN CALCIUM 10 MG: 10 TABLET, FILM COATED ORAL at 07:41

## 2018-11-15 RX ADMIN — HYDRALAZINE HYDROCHLORIDE 50 MG: 50 TABLET ORAL at 22:06

## 2018-11-15 RX ADMIN — ASPIRIN 81 MG: 81 TABLET, COATED ORAL at 07:41

## 2018-11-15 RX ADMIN — SENNOSIDES AND DOCUSATE SODIUM 1 TABLET: 8.6; 5 TABLET ORAL at 22:07

## 2018-11-15 RX ADMIN — POTASSIUM CHLORIDE 40 MEQ: 750 TABLET, EXTENDED RELEASE ORAL at 20:07

## 2018-11-15 RX ADMIN — DIGOXIN 125 MCG: 125 TABLET ORAL at 07:41

## 2018-11-15 RX ADMIN — Medication 5000 UNITS: at 13:26

## 2018-11-15 RX ADMIN — POTASSIUM CHLORIDE 40 MEQ: 750 TABLET, EXTENDED RELEASE ORAL at 07:41

## 2018-11-15 RX ADMIN — FUROSEMIDE 60 MG: 10 INJECTION, SOLUTION INTRAVENOUS at 07:42

## 2018-11-15 RX ADMIN — ISOSORBIDE DINITRATE 10 MG: 10 TABLET ORAL at 10:39

## 2018-11-15 RX ADMIN — BUPROPION HYDROCHLORIDE 150 MG: 150 TABLET, FILM COATED, EXTENDED RELEASE ORAL at 07:27

## 2018-11-15 RX ADMIN — OMEPRAZOLE 20 MG: 20 CAPSULE, DELAYED RELEASE ORAL at 10:39

## 2018-11-15 RX ADMIN — PREDNISONE 30 MG: 10 TABLET ORAL at 10:39

## 2018-11-15 RX ADMIN — HYDRALAZINE HYDROCHLORIDE 50 MG: 50 TABLET ORAL at 13:26

## 2018-11-15 RX ADMIN — Medication 5000 UNITS: at 22:06

## 2018-11-15 RX ADMIN — ISOSORBIDE DINITRATE 10 MG: 10 TABLET ORAL at 20:07

## 2018-11-15 RX ADMIN — FUROSEMIDE 80 MG: 10 INJECTION, SOLUTION INTRAVENOUS at 20:07

## 2018-11-15 RX ADMIN — MILRINONE LACTATE 0.12 MCG/KG/MIN: 200 INJECTION, SOLUTION INTRAVENOUS at 13:00

## 2018-11-15 RX ADMIN — Medication 5000 UNITS: at 05:52

## 2018-11-15 RX ADMIN — Medication 25 MG: at 22:06

## 2018-11-15 RX ADMIN — ISOSORBIDE DINITRATE 10 MG: 10 TABLET ORAL at 13:26

## 2018-11-15 RX ADMIN — THIAMINE HCL TAB 100 MG 100 MG: 100 TAB at 07:41

## 2018-11-15 RX ADMIN — HYDRALAZINE HYDROCHLORIDE 50 MG: 50 TABLET ORAL at 05:52

## 2018-11-15 ASSESSMENT — ACTIVITIES OF DAILY LIVING (ADL)
ADLS_ACUITY_SCORE: 9

## 2018-11-15 NOTE — PLAN OF CARE
Problem: Patient Care Overview  Goal: Plan of Care/Patient Progress Review  PT 4C: PT evaluation completed, treatment initiated.   Discharge Planner PT   Patient plan for discharge: home  Current status: pt limited today by RLE foot pain from gout, completes transfers and short distance gait in room with SBA.   Barriers to return to prior living situation: medical status, pt lives alone, 9 stairs to enter home  Recommendations for discharge: home pending medical status   Rationale for recommendations: pt anticipating returning home and spending time in [place] prior to potential surgery, pt moving well without assist currently.        Entered by: Ashley Chairez 11/15/2018 4:50 PM

## 2018-11-15 NOTE — PROGRESS NOTES
Lasix and Dobutamine d/c'd per MD order, changed to Milrinone. At 1600, SVR 1687, CI 1.5, and MAPs high 80's- Cards 2 called and notified- stated to not start nipride gtt, but they will increase hydralazine dose. Dental CT done today. Weaned to RA. Lasix 80mg IVP given x1, good UOP. Pt spoke with Dr Mondragon twice today regarding possible VAD in the near future, pt has not made a decision at this time. Vitals stable, will monitor.

## 2018-11-15 NOTE — PROGRESS NOTES
LVAD Social Work Services Progress Note      Date of Initial Social Work Evaluation: 10/23/18  Collaborated with: Lauren NUNEZ, Patient, Unit ANDRE    Data: Fabiano was inpatient a couple of weeks ago on 6C. The MD's started the evaluation process for him to be worked up for LVAD. Psychosocial assessment was completed at that time and deemed an acceptable candidate for LVAD if the patient made the decision to pursue it. He and his Sister came here for appointments on Tuesday of this week and attended LVAD Support group that day. He was later admitted to the hospital and is on 4E. The doctors continue to talk with him about LVAD. Sister, Fani, is here and staying at a nearby hotel. The patient's significant other, Yanna, if flying in from [place] so they can talk about LVAD, surgery, recovery, and even end of life. The patient expressed really wanting input from Yanna. He is also considering going to [place] one last time prior to surgery if possible. Was planning to go between 11/27-12/11  Intervention: Met with Lauren UNNEZ and spoke with patient in his room. Offered emotional support and time to discuss thoughts about having an LVAD. Assessed coping and patient's desire to pursue VAD or not. Confirmed that patient's Sister, Fani, would be primary caregiver after VAD. Followed up with Unit Sergio's note about a HCD. Asked patient about whether he had questions about the directive and how to fill it out. Explained purpose and reasons for health care agents. Explained notary services available versus who can be witnesses.   Assessment: Patient verbalized understanding toward reasons for HCD completion and indicates he will try and work on it later today or tomorrow. Also verbalized that he is leaning toward pursuing VAD.  Education provided by ANDRE: Role of VAD SWer through VAD process. Reasons for HCA and how to complete.  Plan:    Discharge Plans in Progress: TBD    Barriers to d/c plan: ICU with Tone    Follow up Plan: ANDRE will continue  to follow until decision made about VAD

## 2018-11-15 NOTE — PROGRESS NOTES
Cardiology Progress Note  Danielito Chu MRN: [id number]  Age: 63 year old, : 1955  11/15/18            Changes Today:     - Start Isordil 10mg TID for additional afterload reduction   - Dig level 1.5 - repeat level on 18. Continue 125mcg Qday   - Continue lasix 60mg IV BID and titrate based on hemodynamics.   - Start steroids for gout. Suspect increased fluid retention secondary to prednisone. Will monitor and titrate diuretics.  - Ongoing discussions with family and patient         Assessment and Plan:     Danielito Chu is a 63 year old male with a PMHx of Congestive Heart Failure with Reduced Ejection Fraction s/p ICD, HTN, DM2, SHIRLEY who presents with increasing dizziness, vomiting and fatigue concerning for low output cardiogenic shock.       # Cardiogenic Shock   # NICM, HFpEF, decompensated  # S/P ICD ([employer] implanted 2017)  Primary Cardiologist: Dr.Rebecca June Wayne numbers on admission 18 - RA-18, PA-42/30, Thu-31, PCWP-28, SvO2 - 53, CI - 1.2, CO - 2.5, Hgb - 15.7, BSA-2msq, SVR - 2304   Recently admitted, attempted medical optimization as patient reluctant to undergo home inotropes or LVAD during past admission. However, now readmitted with significant symptoms including dizziness and fatigue.   Failed OP management on Captopril 6.25mg PO TID (patient reportedly attributing his symptoms of dizziness to such and perhaps not taking), and Torsemide 60mg BID (Sister giving 40mg BID for any days, 3-4 days prior to admission taking 60mg BID and decreased to  60/40mg at clinic )   ---  - Inotrope: Milrinone 0.125mcg/kg/m  - Diuretic: Lasix 60mg IV BID - monitor and uptitrate as able based on HD parameters   - Electrolyte: Scheduled 40meq BID  - ACE: HOLD  - AFTERLOAD: Hydralazine 50Q8h , isosorbide dinitrate 10mg TID  - ROSA Ag: Discontinued in setting of hyperkalemia in prior admissions.  - BB: HOLD. On inotropes  - Digoxin  125mcg Qday (decreased from 250 on 11/14)  - ASA: 81 mg Qday   - Statin: Atorvastatin 10mg Qday   - ICD: [employer] implanted 4/7/18   - Strict 1.5-2L fluid restriction, Low 1gm Na diet   - LVAD work up ongoing      # NSVT : Hx of such, no events during prior admission.   - Repeat device interrogation       # Depression: Restart Wellbutrin     ACCESS: Right internal jugular [place], PIV, A-line removed 11/15  FEN: 2gm Na, 2L fluid restriction    PPX: Heparin 5000 subcutaneous Q8h    CODE: Full       Patient was discussed with staff attending, Dr. Giancarlo Rehman  PGY-2 Internal Medicine  Cardiology Service          Subjective     NAEO   Patient pleasant as always. No complaints of sob or chest pain. Having great toe pain and stating that when he got up, sole of his foot was painful too - feelts this may be gout (has experienced this previously.           Objective     Vitals:  Temp:  [97.4  F (36.3  C)-98.1  F (36.7  C)] 97.9  F (36.6  C)  Heart Rate:  [81-99] 81  Resp:  [14-18] 16  MAP:  [64 mmHg-92 mmHg] 65 mmHg  Arterial Line BP: ()/(46-75) 94/54  SpO2:  [93 %-100 %] 98 %     Vitals:    11/13/18 2029 11/13/18 2334 11/15/18 0400   Weight: 84.8 kg (187 lb) 82.4 kg (181 lb 10.5 oz) 82 kg (180 lb 12.4 oz)     Gen: AA&Ox3, no acute distress  BACK: no CVA tenderness, no midline bony tenderness  PULM/THORAX: Clear to auscultation bilaterally, no rales/stridor/wheezes  CV:RRR, S1 and S2 appreciated, no extra heart sounds, murmurs or rub auscultated. No JVD  ABD: soft, nt, bs+  EXT: no edema. R great toe very tender to touch             Data:      Labs reviewed. Pertinent for : k - 4.3, Mg - 2.5, Cr 1.09. Lactic acid 1.2   Hgb 16.2        Medications     Medications    aspirin  81 mg Oral Daily     atorvastatin  10 mg Oral Daily     buPROPion  150 mg Oral Daily     digoxin  125 mcg Oral Daily     furosemide  60 mg Intravenous BID     heparin  5,000 Units Subcutaneous Q8H VITALY     hydrALAZINE  50  mg Oral Q8H VITALY     isosorbide dinitrate  10 mg Oral TID     omeprazole  20 mg Oral QAM AC     potassium chloride  40 mEq Oral BID     predniSONE  30 mg Oral Daily    Followed by     [START ON 11/20/2018] predniSONE  20 mg Oral Daily    Followed by     [START ON 11/22/2018] predniSONE  10 mg Oral Daily     senna-docusate  1 tablet Oral At Bedtime     thiamine  100 mg Oral Daily     traZODone  25 mg Oral At Bedtime       - MEDICATION INSTRUCTIONS -       milrinone 0.125 mcg/kg/min (11/15/18 1200)     nitroPRUsside (NIPRIDE) IV infusion ADULT/PEDS GREATER than or EQUAL to 45 kg std conc Stopped (11/14/18 1200)     Reason beta blocker order not selected

## 2018-11-15 NOTE — PLAN OF CARE
Problem: Patient Care Overview  Goal: Plan of Care/Patient Progress Review  Outcome: No Change  Alert and oriented, up with SBA. SR 90's, Maps between 60-70's. SVR 2100, increased dose of Hydralazine to 50mg for 2200, down to 1300 this morning. CVP 9-12, CI 1.9-2.1, CO 2.9-4.2. RA, 2L NC overnight. 2 gram NA diet, 2L FR. No BM. Voids via urinal. Milrinone at 0.125. No C/O of pain or skin issues. Lytes WNL this AM.    Plan: Continue to monitor MARILYNN numbers. LVAD workup complete.

## 2018-11-15 NOTE — PROGRESS NOTES
11/15/18 1530   Quick Adds   Type of Visit Initial PT Evaluation   Living Environment   Lives With alone   Living Arrangements house   Home Accessibility stairs to enter home;bed and bath on same level   Number of Stairs to Enter Home 9   Number of Stairs Within Home (pt does not use stairs inside )   Transportation Available car;family or friend will provide   Living Environment Comment pt lives alone, SO lives in [place], [place], sister lives in [place] but willing to move back to [place] to stay with pt at discharge   Self-Care   Dominant Hand right   Usual Activity Tolerance good   Current Activity Tolerance moderate   Regular Exercise no   Equipment Currently Used at Home none   Activity/Exercise/Self-Care Comment Pt has access to walker and cane at home, is IND at baseline   Functional Level Prior   Ambulation 0-->independent   Transferring 0-->independent   Toileting 0-->independent   Bathing 0-->independent   Dressing 0-->independent   Eating 0-->independent   Communication 0-->understands/communicates without difficulty   Swallowing 0-->swallows foods/liquids without difficulty   Cognition 0 - no cognition issues reported   Fall history within last six months no   Which of the above functional risks had a recent onset or change? none   Prior Functional Level Comment Pt IND at baseline    General Information   Onset of Illness/Injury or Date of Surgery - Date 11/13/18   Referring Physician Melissa Rehman MD   Patient/Family Goals Statement to get stronger prior to surgery   Pertinent History of Current Problem (include personal factors and/or comorbidities that impact the POC) a 63 year old male with a PMHx of Congestive Heart Failure with Reduced Ejection Fraction s/p ICD, HTN, DM2, SHIRLEY who presents with increasing dizziness, vomiting and low energy.    Precautions/Limitations no known precautions/limitations   Heart Disease Risk Factors Medical history;Gender   General Observations pt supine, swan  "line, pleasant   General Info Comments activity orders: up ad moshe   Cognitive Status Examination   Orientation orientation to person, place and time   Level of Consciousness alert   Follows Commands and Answers Questions 100% of the time   Personal Safety and Judgment intact   Memory intact   Pain Assessment   Patient Currently in Pain No   Integumentary/Edema   Integumentary/Edema Comments pt has gout in RLE   Posture    Posture Not impaired   Range of Motion (ROM)   ROM Quick Adds No deficits were identified   Strength   Manual Muscle Testing Quick Adds No deficits were identified   Bed Mobility   Bed Mobility Comments IND   Transfer Skills   Transfer Comments IND   Gait   Gait Comments SBA, short distance in room, limited by gout   Balance   Balance Comments SBA for balance, no overt LOB   Sensory Examination   Sensory Perception no deficits were identified   General Therapy Interventions   Planned Therapy Interventions progressive activity/exercise;home program guidelines;risk factor education;strengthening;balance training;gait training   Clinical Impression   Criteria for Skilled Therapeutic Intervention yes, treatment indicated   PT Diagnosis impaired endurance, risk for deconditioning    Influenced by the following impairments impaired balance, strength    Functional limitations due to impairments at risk for deconditioning    Clinical Presentation Stable/Uncomplicated   Clinical Presentation Rationale pt with clear presentation    Clinical Decision Making (Complexity) Low complexity   Therapy Frequency` (4x per week)   Predicted Duration of Therapy Intervention (days/wks) 1 week    Anticipated Discharge Disposition Home with Assist   Risk & Benefits of therapy have been explained Yes   Patient, Family & other staff in agreement with plan of care Yes   Clinical Impression Comments pt with appropriate questions regarding LVAD   [facility] AM-PAC TM \"6 Clicks\"   2016, Trustees of [facility], " "under license to [employer].  All rights reserved.   6 Clicks Short Forms Basic Mobility Inpatient Short Form   [facility] AM-PAC  \"6 Clicks\" V.2 Basic Mobility Inpatient Short Form   1. Turning from your back to your side while in a flat bed without using bedrails? 4 - None   2. Moving from lying on your back to sitting on the side of a flat bed without using bedrails? 4 - None   3. Moving to and from a bed to a chair (including a wheelchair)? 4 - None   4. Standing up from a chair using your arms (e.g., wheelchair, or bedside chair)? 4 - None   5. To walk in hospital room? 4 - None   6. Climbing 3-5 steps with a railing? 3 - A Little   Basic Mobility Raw Score (Score out of 24.Lower scores equate to lower levels of function) 23   Total Evaluation Time   Total Evaluation Time (Minutes) 10     "

## 2018-11-15 NOTE — PLAN OF CARE
Problem: Patient Care Overview  Goal: Plan of Care/Patient Progress Review  OT:  OT/CR evaluation started, however pt reports balance issues.  Pt will likely only need one discipline to follow pre-surgery.  PT will follow and OT will hold until pt appropriate for both therapies.

## 2018-11-16 ENCOUNTER — APPOINTMENT (OUTPATIENT)
Dept: PHYSICAL THERAPY | Facility: CLINIC | Age: 63
DRG: 291 | End: 2018-11-16
Attending: INTERNAL MEDICINE
Payer: COMMERCIAL

## 2018-11-16 ENCOUNTER — APPOINTMENT (OUTPATIENT)
Dept: GENERAL RADIOLOGY | Facility: CLINIC | Age: 63
DRG: 291 | End: 2018-11-16
Attending: INTERNAL MEDICINE
Payer: COMMERCIAL

## 2018-11-16 LAB
AMPHETAMINES UR QL SCN: NEGATIVE
ANION GAP SERPL CALCULATED.3IONS-SCNC: 10 MMOL/L (ref 3–14)
ANION GAP SERPL CALCULATED.3IONS-SCNC: 8 MMOL/L (ref 3–14)
BARBITURATES UR QL: NEGATIVE
BASE DEFICIT BLDV-SCNC: 0.1 MMOL/L
BASE DEFICIT BLDV-SCNC: 0.9 MMOL/L
BASE EXCESS BLDV CALC-SCNC: 0.1 MMOL/L
BASE EXCESS BLDV CALC-SCNC: 2.4 MMOL/L
BENZODIAZ UR QL: NEGATIVE
BUN SERPL-MCNC: 18 MG/DL (ref 7–30)
BUN SERPL-MCNC: 22 MG/DL (ref 7–30)
CALCIUM SERPL-MCNC: 8.3 MG/DL (ref 8.5–10.1)
CALCIUM SERPL-MCNC: 9.4 MG/DL (ref 8.5–10.1)
CANNABINOIDS UR QL SCN: NEGATIVE
CHLORIDE SERPL-SCNC: 102 MMOL/L (ref 94–109)
CHLORIDE SERPL-SCNC: 106 MMOL/L (ref 94–109)
CO2 SERPL-SCNC: 24 MMOL/L (ref 20–32)
CO2 SERPL-SCNC: 24 MMOL/L (ref 20–32)
COCAINE UR QL: NEGATIVE
CREAT SERPL-MCNC: 1.19 MG/DL (ref 0.66–1.25)
CREAT SERPL-MCNC: 1.21 MG/DL (ref 0.66–1.25)
ETHANOL UR QL SCN: NEGATIVE
GFR SERPL CREATININE-BSD FRML MDRD: 61 ML/MIN/1.7M2
GFR SERPL CREATININE-BSD FRML MDRD: 62 ML/MIN/1.7M2
GLUCOSE BLDC GLUCOMTR-MCNC: 133 MG/DL (ref 70–99)
GLUCOSE SERPL-MCNC: 109 MG/DL (ref 70–99)
GLUCOSE SERPL-MCNC: 154 MG/DL (ref 70–99)
HCO3 BLDV-SCNC: 22 MMOL/L (ref 21–28)
HCO3 BLDV-SCNC: 23 MMOL/L (ref 21–28)
HCO3 BLDV-SCNC: 25 MMOL/L (ref 21–28)
HCO3 BLDV-SCNC: 27 MMOL/L (ref 21–28)
LAB SCANNED RESULT: ABNORMAL
MAGNESIUM SERPL-MCNC: 2.1 MG/DL (ref 1.6–2.3)
O2/TOTAL GAS SETTING VFR VENT: 21 %
O2/TOTAL GAS SETTING VFR VENT: ABNORMAL %
OPIATES UR QL SCN: NEGATIVE
OXYHGB MFR BLDV: 66 %
OXYHGB MFR BLDV: 67 %
OXYHGB MFR BLDV: 68 %
OXYHGB MFR BLDV: 72 %
PCO2 BLDV: 33 MM HG (ref 40–50)
PCO2 BLDV: 33 MM HG (ref 40–50)
PCO2 BLDV: 39 MM HG (ref 40–50)
PCO2 BLDV: 41 MM HG (ref 40–50)
PCP UR QL SCN: NEGATIVE
PH BLDV: 7.39 PH (ref 7.32–7.43)
PH BLDV: 7.45 PH (ref 7.32–7.43)
PH BLDV: 7.45 PH (ref 7.32–7.43)
PH BLDV: 7.46 PH (ref 7.32–7.43)
PO2 BLDV: 35 MM HG (ref 25–47)
PO2 BLDV: 36 MM HG (ref 25–47)
PO2 BLDV: 39 MM HG (ref 25–47)
PO2 BLDV: 40 MM HG (ref 25–47)
POTASSIUM SERPL-SCNC: 3.9 MMOL/L (ref 3.4–5.3)
POTASSIUM SERPL-SCNC: 4.7 MMOL/L (ref 3.4–5.3)
SODIUM SERPL-SCNC: 136 MMOL/L (ref 133–144)
SODIUM SERPL-SCNC: 138 MMOL/L (ref 133–144)

## 2018-11-16 PROCEDURE — 80320 DRUG SCREEN QUANTALCOHOLS: CPT

## 2018-11-16 PROCEDURE — 25000132 ZZH RX MED GY IP 250 OP 250 PS 637: Performed by: INTERNAL MEDICINE

## 2018-11-16 PROCEDURE — 36569 INSJ PICC 5 YR+ W/O IMAGING: CPT

## 2018-11-16 PROCEDURE — 25000128 H RX IP 250 OP 636: Performed by: STUDENT IN AN ORGANIZED HEALTH CARE EDUCATION/TRAINING PROGRAM

## 2018-11-16 PROCEDURE — 80048 BASIC METABOLIC PNL TOTAL CA: CPT | Performed by: INTERNAL MEDICINE

## 2018-11-16 PROCEDURE — 27210195 ZZH KIT POWER PICC DOUBLE LUMEN

## 2018-11-16 PROCEDURE — 25000132 ZZH RX MED GY IP 250 OP 250 PS 637: Performed by: STUDENT IN AN ORGANIZED HEALTH CARE EDUCATION/TRAINING PROGRAM

## 2018-11-16 PROCEDURE — 40000986 XR CHEST PORT 1 VW

## 2018-11-16 PROCEDURE — 97530 THERAPEUTIC ACTIVITIES: CPT | Mod: GP | Performed by: PHYSICAL THERAPIST

## 2018-11-16 PROCEDURE — 82805 BLOOD GASES W/O2 SATURATION: CPT

## 2018-11-16 PROCEDURE — 21400006 ZZH R&B CCU INTERMEDIATE UMMC

## 2018-11-16 PROCEDURE — 83735 ASSAY OF MAGNESIUM: CPT | Performed by: INTERNAL MEDICINE

## 2018-11-16 PROCEDURE — 25000125 ZZHC RX 250: Performed by: STUDENT IN AN ORGANIZED HEALTH CARE EDUCATION/TRAINING PROGRAM

## 2018-11-16 PROCEDURE — 40000193 ZZH STATISTIC PT WARD VISIT: Performed by: PHYSICAL THERAPIST

## 2018-11-16 PROCEDURE — 99291 CRITICAL CARE FIRST HOUR: CPT | Mod: GC | Performed by: INTERNAL MEDICINE

## 2018-11-16 PROCEDURE — 82805 BLOOD GASES W/O2 SATURATION: CPT | Performed by: INTERNAL MEDICINE

## 2018-11-16 PROCEDURE — 80307 DRUG TEST PRSMV CHEM ANLYZR: CPT

## 2018-11-16 PROCEDURE — 71045 X-RAY EXAM CHEST 1 VIEW: CPT

## 2018-11-16 PROCEDURE — C1769 GUIDE WIRE: HCPCS

## 2018-11-16 RX ORDER — LIDOCAINE 40 MG/G
CREAM TOPICAL
Status: DISCONTINUED | OUTPATIENT
Start: 2018-11-16 | End: 2018-11-21 | Stop reason: HOSPADM

## 2018-11-16 RX ORDER — HEPARIN SODIUM,PORCINE 10 UNIT/ML
5-10 VIAL (ML) INTRAVENOUS EVERY 24 HOURS
Status: DISCONTINUED | OUTPATIENT
Start: 2018-11-16 | End: 2018-11-21 | Stop reason: HOSPADM

## 2018-11-16 RX ORDER — HEPARIN SODIUM 5000 [USP'U]/.5ML
5000 INJECTION, SOLUTION INTRAVENOUS; SUBCUTANEOUS EVERY 8 HOURS SCHEDULED
Status: DISPENSED | OUTPATIENT
Start: 2018-11-16 | End: 2018-11-17

## 2018-11-16 RX ORDER — HEPARIN SODIUM,PORCINE 10 UNIT/ML
2-5 VIAL (ML) INTRAVENOUS
Status: DISCONTINUED | OUTPATIENT
Start: 2018-11-16 | End: 2018-11-21 | Stop reason: HOSPADM

## 2018-11-16 RX ORDER — HYDRALAZINE HYDROCHLORIDE 50 MG/1
50 TABLET, FILM COATED ORAL 3 TIMES DAILY
Status: DISCONTINUED | OUTPATIENT
Start: 2018-11-16 | End: 2018-11-17

## 2018-11-16 RX ORDER — HEPARIN SODIUM,PORCINE 10 UNIT/ML
5-10 VIAL (ML) INTRAVENOUS
Status: DISCONTINUED | OUTPATIENT
Start: 2018-11-16 | End: 2018-11-21 | Stop reason: HOSPADM

## 2018-11-16 RX ADMIN — FUROSEMIDE 80 MG: 10 INJECTION, SOLUTION INTRAVENOUS at 19:46

## 2018-11-16 RX ADMIN — Medication 5000 UNITS: at 06:30

## 2018-11-16 RX ADMIN — THIAMINE HCL TAB 100 MG 100 MG: 100 TAB at 07:45

## 2018-11-16 RX ADMIN — POTASSIUM CHLORIDE 20 MEQ: 750 TABLET, EXTENDED RELEASE ORAL at 06:30

## 2018-11-16 RX ADMIN — HYDRALAZINE HYDROCHLORIDE 50 MG: 50 TABLET ORAL at 19:45

## 2018-11-16 RX ADMIN — ISOSORBIDE DINITRATE 10 MG: 10 TABLET ORAL at 07:45

## 2018-11-16 RX ADMIN — SENNOSIDES AND DOCUSATE SODIUM 1 TABLET: 8.6; 5 TABLET ORAL at 22:04

## 2018-11-16 RX ADMIN — ISOSORBIDE DINITRATE 10 MG: 10 TABLET ORAL at 15:08

## 2018-11-16 RX ADMIN — Medication 5000 UNITS: at 17:23

## 2018-11-16 RX ADMIN — HYDRALAZINE HYDROCHLORIDE 50 MG: 50 TABLET ORAL at 15:08

## 2018-11-16 RX ADMIN — FUROSEMIDE 80 MG: 10 INJECTION, SOLUTION INTRAVENOUS at 07:51

## 2018-11-16 RX ADMIN — BUPROPION HYDROCHLORIDE 150 MG: 150 TABLET, FILM COATED, EXTENDED RELEASE ORAL at 07:46

## 2018-11-16 RX ADMIN — POTASSIUM CHLORIDE 40 MEQ: 750 TABLET, EXTENDED RELEASE ORAL at 19:44

## 2018-11-16 RX ADMIN — Medication 25 MG: at 22:04

## 2018-11-16 RX ADMIN — OMEPRAZOLE 20 MG: 20 CAPSULE, DELAYED RELEASE ORAL at 07:46

## 2018-11-16 RX ADMIN — ISOSORBIDE DINITRATE 10 MG: 10 TABLET ORAL at 19:45

## 2018-11-16 RX ADMIN — ATORVASTATIN CALCIUM 10 MG: 10 TABLET, FILM COATED ORAL at 07:44

## 2018-11-16 RX ADMIN — ASPIRIN 81 MG: 81 TABLET, COATED ORAL at 07:46

## 2018-11-16 RX ADMIN — PREDNISONE 30 MG: 10 TABLET ORAL at 07:45

## 2018-11-16 RX ADMIN — DIGOXIN 125 MCG: 125 TABLET ORAL at 07:45

## 2018-11-16 RX ADMIN — MILRINONE LACTATE 0.12 MCG/KG/MIN: 200 INJECTION, SOLUTION INTRAVENOUS at 17:16

## 2018-11-16 RX ADMIN — LIDOCAINE HYDROCHLORIDE 3.5 ML: 10 INJECTION, SOLUTION EPIDURAL; INFILTRATION; INTRACAUDAL; PERINEURAL at 14:50

## 2018-11-16 RX ADMIN — POTASSIUM CHLORIDE 40 MEQ: 750 TABLET, EXTENDED RELEASE ORAL at 07:45

## 2018-11-16 RX ADMIN — HYDRALAZINE HYDROCHLORIDE 50 MG: 50 TABLET ORAL at 06:30

## 2018-11-16 ASSESSMENT — ACTIVITIES OF DAILY LIVING (ADL)
ADLS_ACUITY_SCORE: 9

## 2018-11-16 NOTE — PROGRESS NOTES
Cardiology Progress Note  Danielito Chu MRN: [id number]  Age: 63 year old, : 2017          Changes Today:     - Liver Bx scheduled for Monday, .  Holding SubQ heparin after evening  and patient NPO at midnight on .  - [place] removed given improvement in hemodynamics.  PICC line placed to continue milrinone gtt.  - Discontinued digoxin in setting of other current medications.        Assessment and Plan:     Danielito Chu is a 63 year old male with a PMHx of Congestive Heart Failure with Reduced Ejection Fraction s/p ICD, HTN, DM2, SHIRLEY who presents with increasing dizziness, vomiting and fatigue concerning for low output cardiogenic shock.       # Cardiogenic Shock   # NICM, HFpEF, decompensated  # S/P ICD ([employer] implanted 2017)  Primary Cardiologist: Dr.Rebecca June Wayne numbers on admission 18 - RA-18, PA-42/30, Thu-31, PCWP-28, SvO2 - 53, CI - 1.2, CO - 2.5, Hgb - 15.7, BSA-2msq, SVR - 2304   Recently admitted, attempted medical optimization as patient reluctant to undergo home inotropes or LVAD during past admission. However, now readmitted with significant symptoms including dizziness and fatigue.   Failed OP management on Captopril 6.25mg PO TID (patient reportedly attributing his symptoms of dizziness to such and perhaps not taking), and Torsemide 60mg BID (Sister giving 40mg BID for any days, 3-4 days prior to admission taking 60mg BID and decreased to  60/40mg at clinic )   ---  - Inotrope: Milrinone 0.125mcg/kg/m  - Diuretic: Lasix 60mg IV BID - monitor and uptitrate as able based on HD parameters   - Electrolyte: Scheduled 40meq BID  - ACE: HOLD  - AFTERLOAD: Hydralazine 50Q8h , isosorbide dinitrate 10mg TID  - ROSA Ag: Discontinued in setting of hyperkalemia in prior admissions.  - BB: HOLD. On inotropes  - Digoxin 125mcg Qday --> Discontinued   - ASA: 81 mg Qday   - Statin: Atorvastatin 10mg  Qday   - ICD: [employer] implanted 4/7/18   - Strict 1.5-2L fluid restriction, Low 1gm Na diet   - LVAD work up ongoing:  Liver Bx scheduled for 11/19       # NSVT : Hx of such, no events during prior admission.   - Repeat device interrogation        # Depression: Restart Wellbutrin     # Gout:  R great toe.  Setting of diuresis.  On Prednisone taper.       ACCESS: Right internal jugular [place] (removed 11/16).  PICC line placed (11/16).    FEN: 2gm Na, 2L fluid restriction    PPX: Heparin 5000 subcutaneous Q8h    CODE: Full       Patient was discussed with staff attending, Dr. Ashley Araujo.    Neeta Corbett MD  PGY-1 Internal Medicine  Pager:  [phone]  Cardiology Service       Late entry - pt seen and examined on 11/16/18  Critical Care ICU Note - Cardiology  Ashley Araujo M.D.    The patient remains in the ICU with on-going need for parenteral medications for the adjustment of blood pressure and cardiac output and maintenance of renal function.      The patient is seen for cardiogenic shock necessitating inotropic agents, afterload reducing agents, fluid and diuretic management to maintain blood pressure and secondary organ function    I personally reviewed:  Hemodynamic parameters obtained by central hemodynamic monitoring including RAP, estimated LVEDP, pulmonary artery pressure, cardiac output and vascular resistances in order to adjust fluids and infused medications for blood pressure and cardiac output maintenance.    Patient remains inotrope dependent.  Will continue to optimize afterload reducing agents to further optimize cardiac hemodynamics.  Continue diuresis.  Will f/u u-tox results.  Plan for liver biopsy to rule out cirrhosis.  If these testing results favorable will plan to proceed with LVAD placement.  Will remove PA catheter today.  If remains stable will transfer out of ICU later today.    Ashley Araujo MD  Section Head - Advanced Heart Failure, Transplantation and Mechanical  Circulatory Support  Co-Director - Adult [facility]  Associate Professor of Medicine, [facility]    I spent 40 minutes in critical care of the patient including 20 minutes of direct patient care including serial assessments and discussion with the patient and patient's care team.           Subjective     Nursing notes reviewed.  No acute events overnight.  Patient states that he feels well.  Gout has improved with the Prednisone.  No new chest pain, SOB, orthopnea, or palpitations.  Overall, patient feels well.             Objective     Vitals:  Temp:  [97  F (36.1  C)-98.6  F (37  C)] 98  F (36.7  C)  Heart Rate:  [] 101  Resp:  [14-16] 16  BP: ()/(46-79) 101/68  MAP:  [65 mmHg-79 mmHg] 72 mmHg  Arterial Line BP: ()/(54-68) 102/59  SpO2:  [96 %-99 %] 97 %  MAP: 69-85  I/O:  1362/1965  [place]: SVo2 72, SAo2 98, CVP 9, PA 50/20 (30), CO 4.36, CI 2.18, SVR 1237, PCWP 26  Vitals:    11/13/18 2029 11/13/18 2334 11/15/18 0400   Weight: 84.8 kg (187 lb) 82.4 kg (181 lb 10.5 oz) 82 kg (180 lb 12.4 oz)   Gen: AA&Ox3, no acute distress.  Laying comfortably in bed.    HEENT:AT/ NC.    PULM/THORAX: Clear to auscultation bilaterally, no rales/stridor/wheezes  CV:RRR, S1 and S2 appreciated.  2/6 systolic ejection murmur best heard at lower sternal border.  JVP ~12 mm Hg    ABD:  Soft, non-tender.  No rebound or guarding.  Normoactive BS.    EXT:  No lower extremity edema.              Data:      Labs reviewed. Pertinent for :   Potassium of 3.9.  Cr of 1.21, slightly increased from yesterday.  Oxyhemoglobin of 72.      Imaging reviewed.  CXR showing appropriate placement of [place].            Medications     Medications    aspirin  81 mg Oral Daily     atorvastatin  10 mg Oral Daily     buPROPion  150 mg Oral Daily     digoxin  125 mcg Oral Daily     furosemide  80 mg Intravenous BID     hydrALAZINE  50 mg Oral Q8H VITALY     isosorbide dinitrate  10 mg Oral TID      omeprazole  20 mg Oral QAM AC     potassium chloride  40 mEq Oral BID     predniSONE  30 mg Oral Daily    Followed by     [START ON 11/20/2018] predniSONE  20 mg Oral Daily    Followed by     [START ON 11/22/2018] predniSONE  10 mg Oral Daily     senna-docusate  1 tablet Oral At Bedtime     thiamine  100 mg Oral Daily     traZODone  25 mg Oral At Bedtime       - MEDICATION INSTRUCTIONS -       milrinone 0.125 mcg/kg/min (11/16/18 0800)     Reason beta blocker order not selected

## 2018-11-16 NOTE — CONSULTS
Patient is on IR schedule 11/19/2018 for a Transjugular liver bx with portal pressures.   Labs WNL for procedure.    Orders for NPO have been entered.   Consent will be done prior to procedure.     Please contact the IR charge RN at [phone] for estimated time of procedure.     Case discussed with Dr. Oh from IR and cardiology.    Emmie Casper DNP, APRN  Interventional Radiology  Pager: [phone]

## 2018-11-16 NOTE — PLAN OF CARE
Problem: Patient Care Overview  Goal: Plan of Care/Patient Progress Review  PT 4E    Discharge Planner PT   Patient plan for discharge: home  Current status: transferred OOB independently.  Foot pain improved.  Ambulated > 500' with intermittent UE support on IV pole due to R foot pain during stance.   Barriers to return to prior living situation: none  Recommendations for discharge: home  Rationale for recommendations: anticipate patient will be independent with functional mobility during expected length of acute stay.         Entered by: Lyndon Genao 11/16/2018 10:10 AM

## 2018-11-16 NOTE — PROGRESS NOTES
CVTS  Patient known to me from surgery clinic.  He is a good candidate for LVAD implant.  We discussed risks, benefits, alternatives to surgery.  He wishes to make a trip to [place] prior to surgery.  I stressed to him that he may be too sick to make this trip successfully.  We discussed minimally invasive LVAD implant.    Will discuss plan of care with heart failure team.  Tentative plan for surgery next week.  Please call with questions.    Andrei Silva  Cardiothoracic surgery  [phone]

## 2018-11-16 NOTE — PROGRESS NOTES
Vitals stable throughout day. No change to Milrinone. Isordil added for afterload reduction. Lasix 60mg given x1 with fair response. Prednisone started for gout flare up. Hemodynamics stable, see flowsheet. Pt, pt's family, and care team continuing VAD discussion, all questions/concerns addressed.

## 2018-11-16 NOTE — PLAN OF CARE
Problem: Patient Care Overview  Goal: Plan of Care/Patient Progress Review  Outcome: No Change  Alert and oriented, up with SBA. SR 90's, Maps between 60-70's. SVR between 3315-0872. CVP 6-10, CI 1.9-2.1, CO 3-4. RA, 2L NC overnight. 2 gram NA diet, 2L FR. No BM. Voids via urinal, 80 mg lasix given. Milrinone at 0.125. No C/O of pain or skin issues. Potassium replaced this AM.    Plan: LVAD workup complete, waiting for patient to decide if he wants it. Continue to monitor [place].

## 2018-11-17 LAB
ANION GAP SERPL CALCULATED.3IONS-SCNC: 7 MMOL/L (ref 3–14)
BUN SERPL-MCNC: 21 MG/DL (ref 7–30)
CALCIUM SERPL-MCNC: 9.2 MG/DL (ref 8.5–10.1)
CHLORIDE SERPL-SCNC: 101 MMOL/L (ref 94–109)
CO2 SERPL-SCNC: 27 MMOL/L (ref 20–32)
COMPREHEN DRUG ANALYSIS UR: NORMAL
CREAT SERPL-MCNC: 1.08 MG/DL (ref 0.66–1.25)
DIGOXIN SERPL-MCNC: 1.2 UG/L (ref 0.5–2)
GFR SERPL CREATININE-BSD FRML MDRD: 69 ML/MIN/1.7M2
GLUCOSE BLDC GLUCOMTR-MCNC: 104 MG/DL (ref 70–99)
GLUCOSE BLDC GLUCOMTR-MCNC: 117 MG/DL (ref 70–99)
GLUCOSE BLDC GLUCOMTR-MCNC: 211 MG/DL (ref 70–99)
GLUCOSE SERPL-MCNC: 92 MG/DL (ref 70–99)
MAGNESIUM SERPL-MCNC: 2.2 MG/DL (ref 1.6–2.3)
POTASSIUM SERPL-SCNC: 4.4 MMOL/L (ref 3.4–5.3)
SODIUM SERPL-SCNC: 135 MMOL/L (ref 133–144)

## 2018-11-17 PROCEDURE — 25000125 ZZHC RX 250: Performed by: STUDENT IN AN ORGANIZED HEALTH CARE EDUCATION/TRAINING PROGRAM

## 2018-11-17 PROCEDURE — 25000132 ZZH RX MED GY IP 250 OP 250 PS 637: Performed by: INTERNAL MEDICINE

## 2018-11-17 PROCEDURE — 25000132 ZZH RX MED GY IP 250 OP 250 PS 637: Performed by: STUDENT IN AN ORGANIZED HEALTH CARE EDUCATION/TRAINING PROGRAM

## 2018-11-17 PROCEDURE — 25000128 H RX IP 250 OP 636: Performed by: STUDENT IN AN ORGANIZED HEALTH CARE EDUCATION/TRAINING PROGRAM

## 2018-11-17 PROCEDURE — 99233 SBSQ HOSP IP/OBS HIGH 50: CPT | Mod: GC | Performed by: INTERNAL MEDICINE

## 2018-11-17 PROCEDURE — 80048 BASIC METABOLIC PNL TOTAL CA: CPT | Performed by: INTERNAL MEDICINE

## 2018-11-17 PROCEDURE — 21400006 ZZH R&B CCU INTERMEDIATE UMMC

## 2018-11-17 PROCEDURE — 00000146 ZZHCL STATISTIC GLUCOSE BY METER IP

## 2018-11-17 PROCEDURE — 80162 ASSAY OF DIGOXIN TOTAL: CPT | Performed by: INTERNAL MEDICINE

## 2018-11-17 PROCEDURE — 83735 ASSAY OF MAGNESIUM: CPT | Performed by: INTERNAL MEDICINE

## 2018-11-17 RX ORDER — TORSEMIDE 20 MG/1
80 TABLET ORAL
Status: DISCONTINUED | OUTPATIENT
Start: 2018-11-17 | End: 2018-11-21 | Stop reason: HOSPADM

## 2018-11-17 RX ORDER — ISOSORBIDE DINITRATE 20 MG/1
20 TABLET ORAL 3 TIMES DAILY
Status: DISCONTINUED | OUTPATIENT
Start: 2018-11-17 | End: 2018-11-21 | Stop reason: HOSPADM

## 2018-11-17 RX ADMIN — HYDRALAZINE HYDROCHLORIDE 50 MG: 50 TABLET ORAL at 13:51

## 2018-11-17 RX ADMIN — SODIUM CHLORIDE, PRESERVATIVE FREE 5 ML: 5 INJECTION INTRAVENOUS at 17:26

## 2018-11-17 RX ADMIN — Medication 25 MG: at 22:38

## 2018-11-17 RX ADMIN — OMEPRAZOLE 20 MG: 20 CAPSULE, DELAYED RELEASE ORAL at 08:38

## 2018-11-17 RX ADMIN — Medication 5000 UNITS: at 08:30

## 2018-11-17 RX ADMIN — ATORVASTATIN CALCIUM 10 MG: 10 TABLET, FILM COATED ORAL at 08:38

## 2018-11-17 RX ADMIN — Medication 5000 UNITS: at 13:51

## 2018-11-17 RX ADMIN — ISOSORBIDE DINITRATE 20 MG: 20 TABLET ORAL at 20:32

## 2018-11-17 RX ADMIN — ISOSORBIDE DINITRATE 10 MG: 10 TABLET ORAL at 08:34

## 2018-11-17 RX ADMIN — ISOSORBIDE DINITRATE 10 MG: 10 TABLET ORAL at 13:51

## 2018-11-17 RX ADMIN — POTASSIUM CHLORIDE 40 MEQ: 750 TABLET, EXTENDED RELEASE ORAL at 08:34

## 2018-11-17 RX ADMIN — THIAMINE HCL TAB 100 MG 100 MG: 100 TAB at 08:38

## 2018-11-17 RX ADMIN — PREDNISONE 30 MG: 10 TABLET ORAL at 08:38

## 2018-11-17 RX ADMIN — BUPROPION HYDROCHLORIDE 150 MG: 150 TABLET, FILM COATED, EXTENDED RELEASE ORAL at 08:38

## 2018-11-17 RX ADMIN — ASPIRIN 81 MG: 81 TABLET, COATED ORAL at 08:38

## 2018-11-17 RX ADMIN — TORSEMIDE 80 MG: 20 TABLET ORAL at 17:51

## 2018-11-17 RX ADMIN — POTASSIUM CHLORIDE 40 MEQ: 750 TABLET, EXTENDED RELEASE ORAL at 20:32

## 2018-11-17 RX ADMIN — FUROSEMIDE 80 MG: 10 INJECTION, SOLUTION INTRAVENOUS at 08:39

## 2018-11-17 RX ADMIN — HYDRALAZINE HYDROCHLORIDE 75 MG: 50 TABLET ORAL at 20:32

## 2018-11-17 RX ADMIN — HYDRALAZINE HYDROCHLORIDE 50 MG: 50 TABLET ORAL at 08:38

## 2018-11-17 RX ADMIN — POLYETHYLENE GLYCOL 3350 17 G: 17 POWDER, FOR SOLUTION ORAL at 11:38

## 2018-11-17 ASSESSMENT — ACTIVITIES OF DAILY LIVING (ADL)
ADLS_ACUITY_SCORE: 9

## 2018-11-17 NOTE — PLAN OF CARE
Problem: Cardiac: Heart Failure (Adult)  Goal: Signs and Symptoms of Listed Potential Problems Will be Absent, Minimized or Managed (Cardiac: Heart Failure)  Signs and symptoms of listed potential problems will be absent, minimized or managed by discharge/transition of care (reference Cardiac: Heart Failure (Adult) CPG).   Outcome: No Change  Patient being worked up for LVAD for end staged  CHF. ON continuous Milrinone  Stable, alert and appropiate. Transfer to floor for continued monitoring possible liver Biopsy on Monday.

## 2018-11-17 NOTE — PROGRESS NOTES
Cardiology Progress Note  Danieltio Chu MRN: [id number]  Age: 63 year old, : 18            Changes Today:     - Increase Hydralazine to 75mg Q8h and Isosorbide to 20mg   - Stop IV diuretic   - Start Torsemide 80mg BID - please measure intake and output accurately.   - Liver biopsy Monday . NPO at MN on         Assessment and Plan:     Danielito Chu is a 63 year old male with a PMHx of Congestive Heart Failure with Reduced Ejection Fraction s/p ICD, HTN, DM2, SHIRLEY who presents with increasing dizziness, vomiting and fatigue concerning for low output cardiogenic shock.       # NICM, HFpEF, decompensated    # S/P ICD ([employer] implanted 2017)  Primary Cardiologist: Dr.Rebecca June [place] numbers on admission 18 - RA-18, PA-42/30, Thu-31, PCWP-28, SvO2 - 53, CI - 1.2, CO - 2.5, Hgb - 15.7, BSA-2msq, SVR - 2304   Recently admitted, attempted medical optimization as patient reluctant to undergo home inotropes or LVAD during past admission. However, now readmitted with significant symptoms including dizziness and fatigue.   Failed OP management on Captopril 6.25mg PO TID (patient reportedly attributing his symptoms of dizziness to such and perhaps not taking), and Torsemide 60mg BID (Sister giving 40mg BID for any days, 3-4 days prior to admission taking 60mg BID and decreased to  60/40mg at clinic )   ---  - Inotrope: Milrinone 0.125mcg/kg/m  - Diuretic:  Torsemide 80mg BID   - Electrolyte: Scheduled 40meq BID  - ACE: HOLD  - AFTERLOAD: Hydralazine 75Q8h , isosorbide dinitrate 20mg TID  - ROSA Ag: Discontinued in setting of hyperkalemia in prior admissions.  - BB: HOLD. On inotropes   - ASA: 81 mg Qday   - Statin: Atorvastatin 10mg Qday   - ICD: [employer] implanted 18   - Strict 1.5-2L fluid restriction, Low 1gm Na diet   - LVAD work up ongoing:  Liver Bx scheduled for           # Depression: Restart Wellbutrin     # Gout:  R great toe.  Setting of diuresis.  On Prednisone taper.  Improving.      ACCESS: PICC (placed 11/16)  FEN: 2gm Na, 2L fluid restriction    PPX: Heparin 5000 subcutaneous Q8h    CODE: Full     Patient was discussed with staff attending, Dr. Van Rehman  PGY-2 Internal Medicine  Cardiology Service       I have reviewed today's vital signs, notes, medications, labs and imaging. I have also seen and examined the patient and agree with the findings and plan as outlined above.    Ashley Araujo MD  Section Head - Advanced Heart Failure, Transplantation and Mechanical Circulatory Support  Co-Director - Adult Congenital and Cardiovascular Genetics Center  Associate Professor of Medicine, [facility]           Subjective     NAEO   Patient comfortable without any issues this morning, laying flat in bed.   No complaints this morning           Objective     Vitals:  Temp:  [96.6  F (35.9  C)-97.8  F (36.6  C)] 97.6  F (36.4  C)  Pulse:  [92] 92  Heart Rate:  [88-98] 95  Resp:  [16] 16  BP: ()/(64-88) 106/72  SpO2:  [92 %-98 %] 98 %     Vitals:    11/13/18 2334 11/15/18 0400 11/17/18 0400   Weight: 82.4 kg (181 lb 10.5 oz) 82 kg (180 lb 12.4 oz) 83 kg (183 lb)       Gen: AA&Ox3, no acute distress  PULM/THORAX: Clear to auscultation bilaterally on anterior examination   CV:RRR, S1 and S2 appreciated, no extra heart sounds, murmurs or rub auscultated. jvd at 8cm  ABD:central adiposity, nt and nd, bs+   EXT: no significant LE edema             Data:      Labs reviewed. Pertinent for : Na 135, K - 4.4, Cr 1.08          Medications     Medications    aspirin  81 mg Oral Daily     atorvastatin  10 mg Oral Daily     buPROPion  150 mg Oral Daily     furosemide  80 mg Intravenous BID     heparin lock flush  5-10 mL Intracatheter Q24H     heparin  5,000 Units Subcutaneous Q8H VITALY     hydrALAZINE  50 mg Oral TID     isosorbide dinitrate  10 mg Oral TID     omeprazole  20 mg Oral QAM AC      potassium chloride  40 mEq Oral BID     predniSONE  30 mg Oral Daily    Followed by     [START ON 11/20/2018] predniSONE  20 mg Oral Daily    Followed by     [START ON 11/22/2018] predniSONE  10 mg Oral Daily     senna-docusate  1 tablet Oral At Bedtime     thiamine  100 mg Oral Daily     traZODone  25 mg Oral At Bedtime       - MEDICATION INSTRUCTIONS -       milrinone 0.125 mcg/kg/min (11/17/18 0830)     Reason beta blocker order not selected

## 2018-11-17 NOTE — PLAN OF CARE
Problem: Patient Care Overview  Goal: Plan of Care/Patient Progress Review  D: Acute on chronic systolic congestive heart failure (H)  (primary encounter diagnosis)  Milrinone therapy initiation and LVAD w/u    I: Monitored vitals and assessed pt status.     Running:Milrinone @ 0.125mcg/kg/min via PICC.      A: A0x4. VSS, on RA. NSR with 1st degree AVB. Afebrile. Denies pain. N0 BM yet, senna given and encouraged ambulation. PCU collect for labs. Pt asking lots of questions about LVADs and success, risk vs benefit. 2L FR     I/O this shift:  In: 24.54 [I.V.:24.54]  Out: 500 [Urine:500]    Temp:  [96.6  F (35.9  C)-98  F (36.7  C)] 97.8  F (36.6  C)  Pulse:  [92] 92  Heart Rate:  [] 88  Resp:  [16] 16  BP: ()/(62-88) 94/65  SpO2:  [92 %-98 %] 92 %      P: Continue to monitor Pt status and report changes to treatment team. Liver bx Monday.

## 2018-11-18 LAB
ANION GAP SERPL CALCULATED.3IONS-SCNC: 7 MMOL/L (ref 3–14)
ANION GAP SERPL CALCULATED.3IONS-SCNC: 8 MMOL/L (ref 3–14)
ANION GAP SERPL CALCULATED.3IONS-SCNC: 9 MMOL/L (ref 3–14)
BUN SERPL-MCNC: 23 MG/DL (ref 7–30)
BUN SERPL-MCNC: 24 MG/DL (ref 7–30)
BUN SERPL-MCNC: 27 MG/DL (ref 7–30)
CALCIUM SERPL-MCNC: 9.3 MG/DL (ref 8.5–10.1)
CALCIUM SERPL-MCNC: 9.4 MG/DL (ref 8.5–10.1)
CALCIUM SERPL-MCNC: 9.4 MG/DL (ref 8.5–10.1)
CHLORIDE SERPL-SCNC: 100 MMOL/L (ref 94–109)
CHLORIDE SERPL-SCNC: 97 MMOL/L (ref 94–109)
CHLORIDE SERPL-SCNC: 99 MMOL/L (ref 94–109)
CO2 SERPL-SCNC: 29 MMOL/L (ref 20–32)
CO2 SERPL-SCNC: 29 MMOL/L (ref 20–32)
CO2 SERPL-SCNC: 30 MMOL/L (ref 20–32)
CREAT SERPL-MCNC: 1.09 MG/DL (ref 0.66–1.25)
CREAT SERPL-MCNC: 1.09 MG/DL (ref 0.66–1.25)
CREAT SERPL-MCNC: 1.22 MG/DL (ref 0.66–1.25)
GFR SERPL CREATININE-BSD FRML MDRD: 60 ML/MIN/1.7M2
GFR SERPL CREATININE-BSD FRML MDRD: 68 ML/MIN/1.7M2
GFR SERPL CREATININE-BSD FRML MDRD: 68 ML/MIN/1.7M2
GLUCOSE BLDC GLUCOMTR-MCNC: 114 MG/DL (ref 70–99)
GLUCOSE BLDC GLUCOMTR-MCNC: 163 MG/DL (ref 70–99)
GLUCOSE SERPL-MCNC: 111 MG/DL (ref 70–99)
GLUCOSE SERPL-MCNC: 137 MG/DL (ref 70–99)
GLUCOSE SERPL-MCNC: 94 MG/DL (ref 70–99)
MAGNESIUM SERPL-MCNC: 2.1 MG/DL (ref 1.6–2.3)
POTASSIUM SERPL-SCNC: 3.8 MMOL/L (ref 3.4–5.3)
POTASSIUM SERPL-SCNC: 3.8 MMOL/L (ref 3.4–5.3)
POTASSIUM SERPL-SCNC: 4 MMOL/L (ref 3.4–5.3)
SODIUM SERPL-SCNC: 136 MMOL/L (ref 133–144)
SODIUM SERPL-SCNC: 136 MMOL/L (ref 133–144)
SODIUM SERPL-SCNC: 137 MMOL/L (ref 133–144)

## 2018-11-18 PROCEDURE — 25000128 H RX IP 250 OP 636: Performed by: STUDENT IN AN ORGANIZED HEALTH CARE EDUCATION/TRAINING PROGRAM

## 2018-11-18 PROCEDURE — 25000132 ZZH RX MED GY IP 250 OP 250 PS 637: Performed by: STUDENT IN AN ORGANIZED HEALTH CARE EDUCATION/TRAINING PROGRAM

## 2018-11-18 PROCEDURE — 80048 BASIC METABOLIC PNL TOTAL CA: CPT | Performed by: INTERNAL MEDICINE

## 2018-11-18 PROCEDURE — 25000125 ZZHC RX 250: Performed by: STUDENT IN AN ORGANIZED HEALTH CARE EDUCATION/TRAINING PROGRAM

## 2018-11-18 PROCEDURE — 25000132 ZZH RX MED GY IP 250 OP 250 PS 637: Performed by: INTERNAL MEDICINE

## 2018-11-18 PROCEDURE — 21400006 ZZH R&B CCU INTERMEDIATE UMMC

## 2018-11-18 PROCEDURE — 00000146 ZZHCL STATISTIC GLUCOSE BY METER IP

## 2018-11-18 PROCEDURE — 40000802 ZZH SITE CHECK

## 2018-11-18 PROCEDURE — 83735 ASSAY OF MAGNESIUM: CPT | Performed by: INTERNAL MEDICINE

## 2018-11-18 PROCEDURE — 99232 SBSQ HOSP IP/OBS MODERATE 35: CPT | Mod: GC | Performed by: INTERNAL MEDICINE

## 2018-11-18 RX ORDER — ACETAMINOPHEN 325 MG/1
650 TABLET ORAL EVERY 4 HOURS PRN
Status: DISCONTINUED | OUTPATIENT
Start: 2018-11-18 | End: 2018-11-21 | Stop reason: HOSPADM

## 2018-11-18 RX ADMIN — SODIUM CHLORIDE, PRESERVATIVE FREE 5 ML: 5 INJECTION INTRAVENOUS at 15:29

## 2018-11-18 RX ADMIN — ISOSORBIDE DINITRATE 20 MG: 20 TABLET ORAL at 08:17

## 2018-11-18 RX ADMIN — TORSEMIDE 80 MG: 20 TABLET ORAL at 15:29

## 2018-11-18 RX ADMIN — THIAMINE HCL TAB 100 MG 100 MG: 100 TAB at 08:17

## 2018-11-18 RX ADMIN — ACETAMINOPHEN 650 MG: 325 TABLET, FILM COATED ORAL at 15:29

## 2018-11-18 RX ADMIN — ASPIRIN 81 MG: 81 TABLET, COATED ORAL at 08:15

## 2018-11-18 RX ADMIN — POTASSIUM CHLORIDE 40 MEQ: 750 TABLET, EXTENDED RELEASE ORAL at 08:16

## 2018-11-18 RX ADMIN — BUPROPION HYDROCHLORIDE 150 MG: 150 TABLET, FILM COATED, EXTENDED RELEASE ORAL at 08:17

## 2018-11-18 RX ADMIN — ATORVASTATIN CALCIUM 10 MG: 10 TABLET, FILM COATED ORAL at 08:17

## 2018-11-18 RX ADMIN — ISOSORBIDE DINITRATE 20 MG: 20 TABLET ORAL at 19:52

## 2018-11-18 RX ADMIN — POTASSIUM CHLORIDE 20 MEQ: 750 TABLET, EXTENDED RELEASE ORAL at 02:15

## 2018-11-18 RX ADMIN — TORSEMIDE 80 MG: 20 TABLET ORAL at 08:15

## 2018-11-18 RX ADMIN — MILRINONE LACTATE 0.12 MCG/KG/MIN: 200 INJECTION, SOLUTION INTRAVENOUS at 02:15

## 2018-11-18 RX ADMIN — HYDRALAZINE HYDROCHLORIDE 75 MG: 50 TABLET ORAL at 14:17

## 2018-11-18 RX ADMIN — Medication 25 MG: at 23:20

## 2018-11-18 RX ADMIN — ISOSORBIDE DINITRATE 20 MG: 20 TABLET ORAL at 14:17

## 2018-11-18 RX ADMIN — PREDNISONE 30 MG: 10 TABLET ORAL at 08:15

## 2018-11-18 RX ADMIN — POTASSIUM CHLORIDE 20 MEQ: 750 TABLET, EXTENDED RELEASE ORAL at 23:20

## 2018-11-18 RX ADMIN — HYDRALAZINE HYDROCHLORIDE 75 MG: 50 TABLET ORAL at 08:17

## 2018-11-18 RX ADMIN — HYDRALAZINE HYDROCHLORIDE 75 MG: 50 TABLET ORAL at 19:52

## 2018-11-18 RX ADMIN — POTASSIUM CHLORIDE 20 MEQ: 750 TABLET, EXTENDED RELEASE ORAL at 14:17

## 2018-11-18 RX ADMIN — OMEPRAZOLE 20 MG: 20 CAPSULE, DELAYED RELEASE ORAL at 08:17

## 2018-11-18 RX ADMIN — POTASSIUM CHLORIDE 40 MEQ: 750 TABLET, EXTENDED RELEASE ORAL at 19:52

## 2018-11-18 ASSESSMENT — ACTIVITIES OF DAILY LIVING (ADL)
ADLS_ACUITY_SCORE: 9

## 2018-11-18 ASSESSMENT — PAIN DESCRIPTION - DESCRIPTORS
DESCRIPTORS: ACHING
DESCRIPTORS: HEADACHE;PRESSURE
DESCRIPTORS: HEADACHE;PRESSURE
DESCRIPTORS: PRESSURE

## 2018-11-18 NOTE — PLAN OF CARE
Problem: Cardiac: Heart Failure (Adult)  Goal: Signs and Symptoms of Listed Potential Problems Will be Absent, Minimized or Managed (Cardiac: Heart Failure)  Signs and symptoms of listed potential problems will be absent, minimized or managed by discharge/transition of care (reference Cardiac: Heart Failure (Adult) CPG).   Outcome: Improving  D:Up out of bed to chair for meals.   Report having not slept well during the night.   Denies chest pain denies shortness of breath.  Lungs are clear bilaterally.   No edema.  Is on torsemide 80 mg po.  Is voiding in good amounts.   Continue on milrinone drip as ordered.   Reports feeling better over all.   Requested another LVAD show and tell.  6C charge RN has put in a request.   Encouraged to ambulate in halls.  Up walking this morning with significant other.   Rhythm is NSR 90's.   Occasional PVC's.  K+ 4.0,  Received 20 mEq  Po potassium.  Bp 104/78-96/72 MAP 84-76  O2 sat 95% on room air.    A:Is feeling better.  Tolerating diet and activity well.   Breathing easy and has normal sats on room air.  Rhythm is stable.  Vital signs are stable with a good MAP.   Condition is improved.    P;Continue to assess level of comfort.   Assess respiratory status.   Assess O2 sats.  Monitor rhythm and vital sign,  MAP. Notify MD with any acute changes.  Continue with current medical plan of care.

## 2018-11-18 NOTE — DISCHARGE SUMMARY
[facility]   Cardiology II Service / Advanced Heart Failure  Discharge Summary     Danielito Chu MRN# [id number]   YOB: 1955 Age: 63 year old     DATE OF ADMISSION: 11/13/2018  DATE OF DISCHARGE: 11/21/2018  ADMITTING PROVIDER: Romaine Mondragon MD  DISCHARGE PROVIDER: Ashley Araujo MD  PRIMARY PROVIDER: Bashir Hines         Reason for Admission:   Fatigue          Discharge Diagnosis:   Ambulatory Cardiogenic Shock   Decompensated, end stage Non Ishemic Cardiomyopathy          Follow Up:   - Follow up with , his primary cardiologist and/or [facility] clinic in 1-2 weeks         Pending Results:   None         Hospital Course by Problem:    63 year old male with a PMHx of Non ischemic Cardiomyopathy and HFrEF with s/p ICD, HTN, DM2, SHIRLEY who presented with increasing dizziness, vomiting and fatigue most concerning for progressively worsening ambulatory cardiogenic shock. Medications were adjusted per [place] numbers. Discharged home on milrinone gtt.       # Ambulaardiogenic shock  # NICM, HFpEF, decompensated    # S/P ICD ([employer] implanted 4/7/2017)  Presented with increasing dizziness, vomiting and low energy concerning for low output cardiogenic shock. Not tolerate neurohormonal blockade.   [place] numbers on admission 11/13/18 - RA-18, PA-42/30, Thu-31, PCWP-28, SvO2 - 53, CI - 1.2, CO - 2.5, Hgb - 15.7, BSA-2msq, SVR - 2304. Patient was started on nipride gtt and lasix gtt. Nipride gtt weaned off as milrinone was started on 11/14.   [place] numbers on [place] removal 11/16/18: RA-14, PA - 42/22, wedge - 26, SvO2-68, CI - 1.8, CO-3.6, Hgb - 16.2, BSA-2msq, SVR - 1404   VAD evaluation was completed. Multiple medications were adjusted and finalized as a list below.   ---  - Inotrope: Milrinone 0.125mcg/kg/min (Home infusion has been set up)  - Diuretic: Torsemide 80mg BID   - Electrolyte: Scheduled KCl 60meq BID  - ACE: Discontinued  - AFTERLOAD: Hydralazine 75Q8h , isosorbide  dinitrate 20mg TID  - ROSA Ag: Discontinued in setting of hyperkalemia in prior admissions.  - BB: HOLD. On inotropes   - ASA: 81 mg Qday   - Statin: Atorvastatin 10mg Qday   - ICD: [employer] implanted 4/7/18   - Strict 1.5-2L fluid restriction, Low 1gm Na diet   - Completed LVAD work-up  - Digoxin discontinued this admission.     # F3 hepatic fibrosis  Patient underwent transjugular liver biopsy on 11/19 which showed evidence of regenerative nodules along with fibrous bands. Minimal steatosis present. His HVPG was measured multiple times and they were all elevated- max at 70 mmHg which did not clinically correlated with his picture. Appreciate GI assistance. Patient underwent EGD for EV surveillance on 11/21- negative for EV, no evidence of portal hypertension on imaging or EGD.      # Depression: Continue Wellbutrin   # Gout: Occured at R great toe, in the setting of diuresis. On Prednisone taper which will be completed on 11/24/2018.     Physical Exam on day of Discharge:  Blood pressure 116/80, pulse 98, temperature 97.5  F (36.4  C), temperature source Axillary, resp. rate 19, weight 80.2 kg (176 lb 11.2 oz), SpO2 96 %.    Gen: AA&Ox3, no acute distress  HEENT: NCAT, anicteric sclera  Neck: no JVD  PULM/THORAX: Clear to auscultation bilaterally, no rales/stridor/wheezes  CV: irregular irregularity, systolic ejection murmurs noted at RUPSB  ABD: soft, nontender, nondistended, liver and spleen not palpable  EXT: no pitting edema         Procedures & Significant Findings:   Right Heart Catheterization  On admission 11/13/18 - RA-18, PA-42/30, Thu-31, PCWP-28, SvO2 - 53, CI - 1.2, CO - 2.5, Hgb - 15.7, BSA-2msq, SVR - 2304   [facility] on 11/16/18 before [place] removal: RA-14, PA - 42/22, wedge - 26, SvO2-68, CI - 1.8, CO-3.6, Hgb - 16.2, BSA-2msq, SVR - 1404    Transjugular Liver Biopsy (11/19/18)  Liver needle biopsy, transjugular:   - Liver with findings suggestive of advanced chronic liver disease (stage   3-4  fibrosis)     Upper GI endoscopy (11/21/2018)  Impression:        - Normal esophagus.                         - Normal stomach.                         - Normal examined duodenum.                         - No specimens collected.          Consultations:   Dental   Cardiothoracic Surgery   Interventional Radiology     Neuropsychology  Palliative Care  Gastroenterology          Discharge Medications:     Discharge Medication List as of 11/21/2018  3:39 PM      START taking these medications    Details   hydrALAZINE (APRESOLINE) 25 MG tablet Take 3 tablets (75 mg) by mouth 3 times daily, Disp-120 tablet, R-3, E-Prescribe      isosorbide dinitrate (ISORDIL) 20 MG tablet Take 1 tablet (20 mg) by mouth 3 times daily, Disp-90 tablet, R-2, MEENU, E-Prescribe      milrinone (PRIMACOR) infusion 200 mcg/mL PREMIX Inject 10.3 mcg/min into the vein continuous, Disp-1000 mL, R-3, Local Print      omeprazole (PRILOSEC) 20 MG CR capsule Take 1 capsule (20 mg) by mouth every morning (before breakfast), Disp-90 capsule, R-3, E-Prescribe      predniSONE (DELTASONE) 10 MG tablet Take 1 tablet (10 mg) by mouth daily for 2 days, Disp-2 tablet, R-0, E-Prescribe         CONTINUE these medications which have CHANGED    Details   potassium chloride ER (K-TAB) 20 MEQ ER tablet Take 3 tablets (60 mEq) by mouth 2 times daily, Disp-180 tablet, R-11, MEENU, E-Prescribe      torsemide (DEMADEX) 20 MG tablet Take 4 tablets (80 mg) by mouth 2 times daily, Disp-180 tablet, R-0, E-Prescribe         CONTINUE these medications which have NOT CHANGED    Details   artificial saliva (BIOTENE DRY MOUTHWASH) LIQD liquid Swish and spit 10 mLs in mouth 4 times daily as needed for dry mouth, Disp-59 mL, R-0, E-Prescribe      aspirin EC 81 MG EC tablet Take 1 tablet (81 mg) by mouth daily, Disp-30 tablet, R-3, E-Prescribe      atorvastatin (LIPITOR) 10 MG tablet Take 1 tablet (10 mg) by mouth daily, Disp-90 tablet, R-3, E-Prescribe      Blood Glucose  Monitoring Suppl (BLOOD GLUCOSE MONITOR SYSTEM) w/Device KIT three times a week, Historical      buPROPion (WELLBUTRIN XL) 150 MG 24 hr tablet Take 1 tablet (150 mg) by mouth daily, Disp-60 tablet, R-0, E-Prescribe      metFORMIN (GLUCOPHAGE) 500 MG tablet Take 1 tablet (500 mg) by mouth 2 times daily (with meals), Historical      thiamine 100 MG tablet Take 1 tablet (100 mg) by mouth daily, Disp-60 tablet, R-0, E-Prescribe      traZODone (DESYREL) 100 MG tablet TAKE 1 TABLET BY MOUTH AT BEDTIME AS NEEDED FOR SLEEP, Disp-30 tablet, R-1, E-Prescribe         STOP taking these medications       digoxin (LANOXIN) 250 MCG tablet Comments:   Reason for Stopping:         lisinopril (PRINIVIL/ZESTRIL) 5 MG tablet Comments:   Reason for Stopping:         sildenafil (VIAGRA) 50 MG tablet Comments:   Reason for Stopping:                    Discharge Instructions and Follow-Up:     Discharge Procedure Orders  Medication Therapy Management Referral   Referral Type: Med Therapy Management     Home infusion referral     Follow Up and recommended labs and tests   Order Comments: ______________________________________________________    [facility]   Phone: [phone]   Fax: [phone]    Appointment with Dr. Bryan Orellana on 11/23/18 at 12:45 PM for PICC line dressing change, establishment of care, post hospitalization follow up.   ______________________________________________________     Reason for your hospital stay   Order Comments: Dear Danielito Chu    Your were hospitalized at [facility] for heart failure and undogoing LVAD workup. Today you are ready to be discharged.      Please get the following tests done:  - BMP check on Monday 11/26    Follow up with [facility] clinic in 1-2 weeks    It was a pleasure meeting with you today. Thank you for allowing me and my team the privilege of caring for you today. You are the reason we are here, and I truly hope we provided you with the  excellent service you deserve. Please let us know if there is anything else we can do for you so that we can be sure you are leaving completely satisfied with your care experience.    Your hospital unit at the time of discharge is 6C so if you have any questions please call the hospital at [phone] and ask to talk to a nurse on 6C.    Take care.  Mary Esposito MD     Adult [facility] Follow-up and recommended labs and tests   Order Comments: Follow up with Dr. Watkins or CORE clinic in 1-2 weeks    Appointments on [place] and/or [facility] (with [facility] or [facility] provider or service). Call [phone] if you haven't heard regarding these appointments within 7 days of discharge.     Follow Up and recommended labs and tests   Order Comments: BMP and Mg on 11/26     Activity   Order Comments: Your activity upon discharge: activity as tolerated   Order Specific Question Answer Comments   Is discharge order? Yes      Full Code   Order Specific Question Answer Comments   Code status determined by: Discussion with patient/legal decision maker      Diet   Order Comments: Follow this diet upon discharge: Orders Placed This Encounter     Fluid restriction 2000 ML FLUID     Advance Diet as Tolerated: Regular Diet Adult   Order Specific Question Answer Comments   Is discharge order? Yes         IV access PICC double lumen at R basilic         Discharge Disposition:   To home         Condition on Discharge:   Discharge condition: Stable   Code status on discharge: Full Code      Date of service: 11/21/2018    The patient was discussed with Dr. Araujo.    60 minutes spent in discharge, including >50% in counseling and coordination of care, medication review and plan of care recommended on follow up. Questions were answered.     Bashir Cooper  (PCP) was contacted at the time of discharge, so as to bridge from hospital to outpatient care.   Heart Failure primary  and Heart Failure pool messaged on  discharge to facilitate continuity of care.     It was our pleasure to care for Danielito Chu during this hospitalization. Please do not hesitate to contact me should there be questions regarding the hospital course or discharge plan.       Mary Esposito MD  PGY-2 Internal Medicine  Pager [phone]        I,Ashley Araujo MD, have seen and examined this patient. I have discussed with the team and agree with the findings and discharge plan. I have reviewed the hospital course with the patient and have spent 35 minutes in the process of discharge, including discharge planning with patient education, medication teaching, and the coordination of care to outpatient providers.  It has been a pleasure to participate in the care of your patient - please do not hesitate to contact me with any questions.    Ashley Araujo MD  Section Head - Advanced Heart Failure, Transplantation and Mechanical Circulatory Support  Co-Director - Adult Congenital and Cardiovascular Genetics Center  Associate Professor of Medicine, [facility]

## 2018-11-18 NOTE — PROGRESS NOTES
Cardiology Progress Note  Danielito Chu MRN: [id number]  Age: 63 year old, : 18            Changes Today:     - NPO at MN   - Liver biopsy in AM   - Hold all Anticoagulation.     Tentative plan for :  = TBW social work regarding home inotrope. Patient will likely discharge with milrinone. PICC in place. Will need teaching.   = plan to discuss with CVTS regarding LVAD date and timing   = Please page LVAD coordinators (star star star) [phone] ---> [phone] to ask for additional show and tell. Patient wishing to try on LVAD belt        Assessment and Plan:     Danielito Chu is a 63 year old male with a PMHx of Congestive Heart Failure with Reduced Ejection Fraction s/p ICD, HTN, DM2, SHIRLEY who presents with increasing dizziness, vomiting and fatigue concerning for  cardiogenic shock.       # NICM, HFpEF, decompensated    # S/P ICD ([employer] implanted 2017)  Primary Cardiologist: Dr.Rebecca Watkins   [place] numbers on admission 18 - RA-18, PA-42/30, Thu-31, PCWP-28, SvO2 - 53, CI - 1.2, CO - 2.5, Hgb - 15.7, BSA-2msq, SVR - 2304   [place] numbers on [place] removal 18: RA-14, PA - 42/22, wedge - 26, SvO2-68, CI - 1.8, CO-3.6, Hgb - 16.2, BSA-2msq, SVR - 1404 -    ---  - Inotrope: Milrinone 0.125mcg/kg/m  - Diuretic:  Torsemide 80mg BID   - Electrolyte: Scheduled 40meq BID  - ACE: HOLD  - AFTERLOAD: Hydralazine 75Q8h , isosorbide dinitrate 20mg TID  - ROSA Ag: Discontinued in setting of hyperkalemia in prior admissions.  - BB: HOLD. On inotropes   - ASA: 81 mg Qday   - Statin: Atorvastatin 10mg Qday   - ICD: [employer] implanted 18   - Strict 1.5-2L fluid restriction, Low 1gm Na diet   - LVAD work up ongoing:  Liver Bx scheduled for 11/19       # Depression: Restart Wellbutrin    # Gout:  R great toe.  Setting of diuresis.  On Prednisone taper.        ACCESS: PICC (placed )  FEN: 2gm Na, 2L fluid restriction    PPX: Heparin 5000  subcutaneous Q8h    CODE: Full      Patient was discussed with staff attending, Dr. Van Rehman  PGY-2 Internal Medicine  Cardiology Service           Late entry - pt seen and examined on 11/18/18  I have reviewed today's vital signs, notes, medications, labs and imaging. I have also seen and examined the patient and agree with the findings and plan as outlined above.    Ashley Araujo MD  Section Head - Advanced Heart Failure, Transplantation and Mechanical Circulatory Support  Co-Director - Adult Congenital and Cardiovascular Genetics Center  Associate Professor of Medicine, [facility]           Subjective     NAEO   Patient with no issues, again resting flat in bed comfortably.   Letter for patient sent to allow his flight to be reimbursed (tubed to floor and in letter section in Epic)           Objective     Vitals:  Temp:  [97.4  F (36.3  C)-98.2  F (36.8  C)] 97.7  F (36.5  C)  Heart Rate:  [] 88  Resp:  [16-18] 18  BP: ()/(70-77) 104/74  SpO2:  [95 %-98 %] 95 %     Vitals:    11/15/18 0400 11/17/18 0400 11/18/18 0610   Weight: 82 kg (180 lb 12.4 oz) 83 kg (183 lb) 82.2 kg (181 lb 4.8 oz)       Gen: AA&Ox3, no acute distress   PULM/THORAX: Clear to auscultation bilaterally, no rales/stridor/wheezes  CV:RRR, S1 and S2 appreciated, no extra heart sounds, murmurs or rub auscultated. jvp to 8cm  ABD:  Soft, nt and nd   EXT:no significant edema           Data:      Labs reviewed. Pertinent for : Na - 137, K - 4.0, Cr - 1.09, Mg - 2.1,   No CBC        Medications     Medications    aspirin  81 mg Oral Daily     atorvastatin  10 mg Oral Daily     buPROPion  150 mg Oral Daily     heparin lock flush  5-10 mL Intracatheter Q24H     hydrALAZINE  75 mg Oral TID     isosorbide dinitrate  20 mg Oral TID     omeprazole  20 mg Oral QAM AC     potassium chloride  40 mEq Oral BID     predniSONE  30 mg Oral Daily    Followed by     [START ON 11/20/2018] predniSONE  20 mg Oral Daily     Followed by     [START ON 11/22/2018] predniSONE  10 mg Oral Daily     senna-docusate  1 tablet Oral At Bedtime     thiamine  100 mg Oral Daily     torsemide  80 mg Oral BID     traZODone  25 mg Oral At Bedtime       - MEDICATION INSTRUCTIONS -       milrinone 0.125 mcg/kg/min (11/18/18 0215)     Reason beta blocker order not selected

## 2018-11-18 NOTE — PLAN OF CARE
"Problem: Cardiac: Heart Failure (Adult)  Goal: Signs and Symptoms of Listed Potential Problems Will be Absent, Minimized or Managed (Cardiac: Heart Failure)  Signs and symptoms of listed potential problems will be absent, minimized or managed by discharge/transition of care (reference Cardiac: Heart Failure (Adult) CPG).   Outcome: Improving  D:Patient reports feeling \"better\" and having \"walked to the neuro unit\".   Denies chest pain.   Denies shortness of breath.  Lungs are clear.  No edema.   Milrinone drip as ordered.   Given verbal education on antihypertensive medication. And  LVAD function and purpose.   Expressed feeling \"scared\".      A:Tolerating diet and activity well.   Rhythm is stable.  AVSS.   Condition is improving.   Tearful about medical situation.  Feeling overwhelmed.        P:Continue to assess status.   Continue with current plan of care.   Allow to ventilate feeling.         "

## 2018-11-18 NOTE — PLAN OF CARE
Problem: Patient Care Overview  Goal: Plan of Care/Patient Progress Review  D: Acute on chronic systolic congestive heart failure (H)  (primary encounter diagnosis)  Milrinone therapy initiation and LVAD w/u     I: Monitored vitals and assessed pt status.      Running:Milrinone @ 0.125mcg/kg/min via PICC.        A: Pt had a 16bt run of VT @0015, asymptomatic. Labs checked K 3.8, Mg 2.1. 20meq KCl given.  VSS, on RA. SR/ST with 1st degree AVB, BBB, PVC cplt, Bigeminy PVCs. Afebrile. Denies pain.  PCU collect for labs. Ind amb in room. Pt asking lots of questions about LVADs and success, risk vs benefit, support provided. 2L FR. Good urine output with torsemide.      P: Continue to monitor Pt status and report changes to treatment team. Liver bx Monday.

## 2018-11-19 ENCOUNTER — APPOINTMENT (OUTPATIENT)
Dept: INTERVENTIONAL RADIOLOGY/VASCULAR | Facility: CLINIC | Age: 63
DRG: 291 | End: 2018-11-19
Attending: NURSE PRACTITIONER
Payer: COMMERCIAL

## 2018-11-19 LAB
ANION GAP SERPL CALCULATED.3IONS-SCNC: 7 MMOL/L (ref 3–14)
ANION GAP SERPL CALCULATED.3IONS-SCNC: 9 MMOL/L (ref 3–14)
BUN SERPL-MCNC: 28 MG/DL (ref 7–30)
BUN SERPL-MCNC: 30 MG/DL (ref 7–30)
CALCIUM SERPL-MCNC: 9.8 MG/DL (ref 8.5–10.1)
CALCIUM SERPL-MCNC: 9.9 MG/DL (ref 8.5–10.1)
CHLORIDE SERPL-SCNC: 98 MMOL/L (ref 94–109)
CHLORIDE SERPL-SCNC: 99 MMOL/L (ref 94–109)
CO2 SERPL-SCNC: 29 MMOL/L (ref 20–32)
CO2 SERPL-SCNC: 29 MMOL/L (ref 20–32)
CREAT SERPL-MCNC: 1.19 MG/DL (ref 0.66–1.25)
CREAT SERPL-MCNC: 1.2 MG/DL (ref 0.66–1.25)
GFR SERPL CREATININE-BSD FRML MDRD: 61 ML/MIN/1.7M2
GFR SERPL CREATININE-BSD FRML MDRD: 62 ML/MIN/1.7M2
GLUCOSE BLDC GLUCOMTR-MCNC: 134 MG/DL (ref 70–99)
GLUCOSE BLDC GLUCOMTR-MCNC: 187 MG/DL (ref 70–99)
GLUCOSE SERPL-MCNC: 105 MG/DL (ref 70–99)
GLUCOSE SERPL-MCNC: 151 MG/DL (ref 70–99)
MAGNESIUM SERPL-MCNC: 2.2 MG/DL (ref 1.6–2.3)
POTASSIUM SERPL-SCNC: 3.8 MMOL/L (ref 3.4–5.3)
POTASSIUM SERPL-SCNC: 4.3 MMOL/L (ref 3.4–5.3)
SODIUM SERPL-SCNC: 134 MMOL/L (ref 133–144)
SODIUM SERPL-SCNC: 136 MMOL/L (ref 133–144)

## 2018-11-19 PROCEDURE — 27210777 ZZH KIT CR15

## 2018-11-19 PROCEDURE — 25000128 H RX IP 250 OP 636: Performed by: STUDENT IN AN ORGANIZED HEALTH CARE EDUCATION/TRAINING PROGRAM

## 2018-11-19 PROCEDURE — 0FB03ZX EXCISION OF LIVER, PERCUTANEOUS APPROACH, DIAGNOSTIC: ICD-10-PCS | Performed by: RADIOLOGY

## 2018-11-19 PROCEDURE — C1769 GUIDE WIRE: HCPCS

## 2018-11-19 PROCEDURE — 27210804 ZZH SHEATH CR3

## 2018-11-19 PROCEDURE — C1887 CATHETER, GUIDING: HCPCS

## 2018-11-19 PROCEDURE — 25000128 H RX IP 250 OP 636: Performed by: RADIOLOGY

## 2018-11-19 PROCEDURE — 0DJ08ZZ INSPECTION OF UPPER INTESTINAL TRACT, VIA NATURAL OR ARTIFICIAL OPENING ENDOSCOPIC: ICD-10-PCS | Performed by: INTERNAL MEDICINE

## 2018-11-19 PROCEDURE — 99232 SBSQ HOSP IP/OBS MODERATE 35: CPT | Mod: GC | Performed by: INTERNAL MEDICINE

## 2018-11-19 PROCEDURE — 25000132 ZZH RX MED GY IP 250 OP 250 PS 637: Performed by: INTERNAL MEDICINE

## 2018-11-19 PROCEDURE — 99153 MOD SED SAME PHYS/QHP EA: CPT

## 2018-11-19 PROCEDURE — 25500064 ZZH RX 255 OP 636: Performed by: RADIOLOGY

## 2018-11-19 PROCEDURE — 80048 BASIC METABOLIC PNL TOTAL CA: CPT | Performed by: INTERNAL MEDICINE

## 2018-11-19 PROCEDURE — 99152 MOD SED SAME PHYS/QHP 5/>YRS: CPT

## 2018-11-19 PROCEDURE — 27210908 ZZH NEEDLE CR4

## 2018-11-19 PROCEDURE — 25000132 ZZH RX MED GY IP 250 OP 250 PS 637: Performed by: STUDENT IN AN ORGANIZED HEALTH CARE EDUCATION/TRAINING PROGRAM

## 2018-11-19 PROCEDURE — 88313 SPECIAL STAINS GROUP 2: CPT

## 2018-11-19 PROCEDURE — 37200 TRANSCATHETER BIOPSY: CPT

## 2018-11-19 PROCEDURE — 27210732 ZZH ACCESSORY CR1

## 2018-11-19 PROCEDURE — 25000125 ZZHC RX 250: Performed by: STUDENT IN AN ORGANIZED HEALTH CARE EDUCATION/TRAINING PROGRAM

## 2018-11-19 PROCEDURE — 88307 TISSUE EXAM BY PATHOLOGIST: CPT

## 2018-11-19 PROCEDURE — 25000125 ZZHC RX 250: Performed by: RADIOLOGY

## 2018-11-19 PROCEDURE — 27210808 ZZH SHEATH CR7

## 2018-11-19 PROCEDURE — 21400006 ZZH R&B CCU INTERMEDIATE UMMC

## 2018-11-19 PROCEDURE — 83735 ASSAY OF MAGNESIUM: CPT | Performed by: INTERNAL MEDICINE

## 2018-11-19 PROCEDURE — 00000146 ZZHCL STATISTIC GLUCOSE BY METER IP

## 2018-11-19 PROCEDURE — 27210905 ZZH KIT CR7

## 2018-11-19 RX ORDER — FLUMAZENIL 0.1 MG/ML
0.2 INJECTION, SOLUTION INTRAVENOUS
Status: DISCONTINUED | OUTPATIENT
Start: 2018-11-19 | End: 2018-11-19 | Stop reason: HOSPADM

## 2018-11-19 RX ORDER — FENTANYL CITRATE 50 UG/ML
25-50 INJECTION, SOLUTION INTRAMUSCULAR; INTRAVENOUS EVERY 5 MIN PRN
Status: DISCONTINUED | OUTPATIENT
Start: 2018-11-19 | End: 2018-11-19 | Stop reason: HOSPADM

## 2018-11-19 RX ORDER — DEXTROSE MONOHYDRATE 25 G/50ML
25-50 INJECTION, SOLUTION INTRAVENOUS
Status: DISCONTINUED | OUTPATIENT
Start: 2018-11-19 | End: 2018-11-21 | Stop reason: HOSPADM

## 2018-11-19 RX ORDER — IODIXANOL 320 MG/ML
50 INJECTION, SOLUTION INTRAVASCULAR ONCE
Status: COMPLETED | OUTPATIENT
Start: 2018-11-19 | End: 2018-11-19

## 2018-11-19 RX ORDER — NICOTINE POLACRILEX 4 MG
15-30 LOZENGE BUCCAL
Status: DISCONTINUED | OUTPATIENT
Start: 2018-11-19 | End: 2018-11-21 | Stop reason: HOSPADM

## 2018-11-19 RX ORDER — NALOXONE HYDROCHLORIDE 0.4 MG/ML
.1-.4 INJECTION, SOLUTION INTRAMUSCULAR; INTRAVENOUS; SUBCUTANEOUS
Status: DISCONTINUED | OUTPATIENT
Start: 2018-11-19 | End: 2018-11-21 | Stop reason: HOSPADM

## 2018-11-19 RX ORDER — NALOXONE HYDROCHLORIDE 0.4 MG/ML
.1-.4 INJECTION, SOLUTION INTRAMUSCULAR; INTRAVENOUS; SUBCUTANEOUS
Status: DISCONTINUED | OUTPATIENT
Start: 2018-11-19 | End: 2018-11-19 | Stop reason: HOSPADM

## 2018-11-19 RX ADMIN — FENTANYL CITRATE 50 MCG: 50 INJECTION, SOLUTION INTRAMUSCULAR; INTRAVENOUS at 09:00

## 2018-11-19 RX ADMIN — HYDRALAZINE HYDROCHLORIDE 75 MG: 50 TABLET ORAL at 20:00

## 2018-11-19 RX ADMIN — IODIXANOL 35 ML: 320 INJECTION, SOLUTION INTRAVASCULAR at 10:18

## 2018-11-19 RX ADMIN — MIDAZOLAM 0.5 MG: 1 INJECTION INTRAMUSCULAR; INTRAVENOUS at 09:50

## 2018-11-19 RX ADMIN — HYDRALAZINE HYDROCHLORIDE 75 MG: 50 TABLET ORAL at 14:19

## 2018-11-19 RX ADMIN — ISOSORBIDE DINITRATE 20 MG: 20 TABLET ORAL at 15:43

## 2018-11-19 RX ADMIN — THIAMINE HCL TAB 100 MG 100 MG: 100 TAB at 11:07

## 2018-11-19 RX ADMIN — ISOSORBIDE DINITRATE 20 MG: 20 TABLET ORAL at 11:07

## 2018-11-19 RX ADMIN — POTASSIUM CHLORIDE 40 MEQ: 750 TABLET, EXTENDED RELEASE ORAL at 11:05

## 2018-11-19 RX ADMIN — Medication 25 MG: at 23:06

## 2018-11-19 RX ADMIN — TORSEMIDE 80 MG: 20 TABLET ORAL at 15:43

## 2018-11-19 RX ADMIN — POTASSIUM CHLORIDE 20 MEQ: 750 TABLET, EXTENDED RELEASE ORAL at 14:19

## 2018-11-19 RX ADMIN — OMEPRAZOLE 20 MG: 20 CAPSULE, DELAYED RELEASE ORAL at 11:05

## 2018-11-19 RX ADMIN — MIDAZOLAM 1 MG: 1 INJECTION INTRAMUSCULAR; INTRAVENOUS at 09:00

## 2018-11-19 RX ADMIN — ATORVASTATIN CALCIUM 10 MG: 10 TABLET, FILM COATED ORAL at 11:07

## 2018-11-19 RX ADMIN — TORSEMIDE 80 MG: 20 TABLET ORAL at 11:05

## 2018-11-19 RX ADMIN — MILRINONE LACTATE 0.12 MCG/KG/MIN: 200 INJECTION, SOLUTION INTRAVENOUS at 10:58

## 2018-11-19 RX ADMIN — SODIUM CHLORIDE, PRESERVATIVE FREE 5 ML: 5 INJECTION INTRAVENOUS at 15:44

## 2018-11-19 RX ADMIN — PREDNISONE 30 MG: 10 TABLET ORAL at 11:06

## 2018-11-19 RX ADMIN — ASPIRIN 81 MG: 81 TABLET, COATED ORAL at 11:07

## 2018-11-19 RX ADMIN — BUPROPION HYDROCHLORIDE 150 MG: 150 TABLET, FILM COATED, EXTENDED RELEASE ORAL at 11:06

## 2018-11-19 RX ADMIN — ISOSORBIDE DINITRATE 20 MG: 20 TABLET ORAL at 20:01

## 2018-11-19 RX ADMIN — Medication 5000 UNITS: at 09:09

## 2018-11-19 RX ADMIN — POTASSIUM CHLORIDE 40 MEQ: 750 TABLET, EXTENDED RELEASE ORAL at 20:01

## 2018-11-19 RX ADMIN — FENTANYL CITRATE 25 MCG: 50 INJECTION, SOLUTION INTRAMUSCULAR; INTRAVENOUS at 09:50

## 2018-11-19 RX ADMIN — POLYETHYLENE GLYCOL 3350 17 G: 17 POWDER, FOR SOLUTION ORAL at 16:02

## 2018-11-19 ASSESSMENT — ACTIVITIES OF DAILY LIVING (ADL)
ADLS_ACUITY_SCORE: 9

## 2018-11-19 NOTE — PROCEDURES
Interventional Radiology Brief Post Procedure Note    Procedure: IR TRANSCATHETER BIOPSY, IR PORTOGRAM W HEMODYNAMICS    Proceduralist: Kleber Oh MD    Assistant: Francisco Bhatti MD and None    Time Out: Prior to the start of the procedure and with procedural staff participation, I verbally confirmed the patient s identity using two indicators, relevant allergies, that the procedure was appropriate and matched the consent or emergent situation, and that the correct equipment/implants were available. Immediately prior to starting the procedure I conducted the Time Out with the procedural staff and re-confirmed the patient s name, procedure, and site/side. (The Joint Commission universal protocol was followed.)  Yes    Medications   Medication Event Details Admin User Admin Time   midazolam (VERSED) injection 0.5-1 mg Medication Given Dose: 1 mg; Route: Intravenous Coleen Del Real RN 11/19/2018  9:00 AM   fentaNYL (PF) (SUBLIMAZE) injection 25-50 mcg Medication Given Dose: 50 mcg; Route: Intravenous Coleen Del Real RN 11/19/2018  9:00 AM   heparin 5,000 units in 0.9% sodium chloride 1000 mL Medication New Bag Dose: 5,000 Units; Route: TABLE SOLN; Comment: placed on sterile field for use during the IR procedure Coleen Del Real RN 11/19/2018  9:09 AM   midazolam (VERSED) injection 0.5-1 mg Medication Given Dose: 0.5 mg; Route: Intravenous Coleen Del Real RN 11/19/2018  9:50 AM   fentaNYL (PF) (SUBLIMAZE) injection 25-50 mcg Medication Given Dose: 25 mcg; Route: Intravenous Coleen Del Real RN 11/19/2018  9:50 AM       Sedation: IR Nurse Monitored Care   Post Procedure Summary:  Prior to the start of the procedure and with procedural staff participation, I verbally confirmed the patient s identity using two indicators, relevant allergies, that the procedure was appropriate and matched the consent or emergent situation, and that the correct equipment/implants were available. Immediately prior to starting the  procedure I conducted the Time Out with the procedural staff and re-confirmed the patient s name, procedure, and site/side. (The Joint Commission universal protocol was followed.)  Yes       Sedatives: Fentanyl and Midazolam (Versed)    Vital signs, airway and pulse oximetry were monitored and remained stable throughout the procedure and sedation was maintained until the procedure was complete.  The patient was monitored by staff until sedation discharge criteria were met.    Patient tolerance: Patient tolerated the procedure well with no immediate complications.    Time of sedation in minutes: 75 Minutes minutes from beginning to end of physician one to one monitoring.          Findings: 2 core needle biopsies were obtained. Wedge pressure was over 30, but an accurate measurement could not be done.     Estimated Blood Loss: Minimal    Fluoroscopy Time:  minute(s)    SPECIMENS: Core needle biopsy specimens sent for pathological analysis    Complications: 1. None     Condition: Stable    Plan: Bedrest with the head of the bed elevated for 2 hours.     Comments: See dictated procedure note for full details.    Francisco Bhatti MD

## 2018-11-19 NOTE — PROGRESS NOTES
Interventional Radiology Pre-Procedure Sedation Assessment   Time of Assessment: 8:36 AM    Expected Level: Moderate Sedation    Indication: Sedation is required for the following type of Procedure: Venous    Sedation and procedural consent: Risks, benefits and alternatives were discussed with Patient    PO Intake: Appropriately NPO for procedure    ASA Class: Class 2 - MILD SYSTEMIC DISEASE, NO ACUTE PROBLEMS, NO FUNCTIONAL LIMITATIONS.    Mallampati: Grade 1:  Soft palate, uvula, tonsillar pillars, and posterior pharyngeal wall visible    Lungs: Lungs Clear with good breath sounds bilaterally    Heart: Normal heart sounds and rate    History and physical reviewed and no updates needed. I have reviewed the lab findings, diagnostic data, medications, and the plan for sedation. I have determined this patient to be an appropriate candidate for the planned sedation and procedure and have reassessed the patient IMMEDIATELY PRIOR to sedation and procedure.    Francisco Bhatti MD

## 2018-11-19 NOTE — PROGRESS NOTES
D:  Stopped by to see pt as he had further questions about VAD.  I:  Showed patient how VAD is worn peripherally and discussed options for carrying the equipment.  Answered his questions.  A:  Pt had no further questions at this time.  P:  VAD coordinator available for questions or concerns.

## 2018-11-19 NOTE — PLAN OF CARE
Problem: Patient Care Overview  Goal: Plan of Care/Patient Progress Review  Outcome: No Change  Neuro: A&Ox4.   Cardiac: PVCs, 1st degree block. ICD. Hypotension 80's/60's (MDs aware). MAP 65 and greater. Other vitals stable.    Respiratory: Sating 98% on RA.  GI/: Adequate urine output. No BM  Diet/appetite: Tolerating 2g diet. Poor aniceto- was NPO for procedure in am.   Activity:  SBA, up to chair and in dixon  Pain: Denies  Skin: Pt noted new bruising on arms.   LDA's: R PICC- Milrinone gtt  Liver Biopsy and Portogram done today (LVAD work-up)    Plan: Continue with POC. Notify primary team with changes. PICC and LVAD teaching ordered.

## 2018-11-19 NOTE — PROVIDER NOTIFICATION
Notified card 2 of BP- other vitals stable MAP stable. Denies any other symptoms. Okay to give Hydralazine

## 2018-11-19 NOTE — PLAN OF CARE
Problem: Patient Care Overview  Goal: Plan of Care/Patient Progress Review  Outcome: No Change  VSS, SR .  A&Ox4, on RA.  No complaints of pain.  Milrinone gtt 0.125 mcg/kg/min (3.1 ml/hr).  PCU collect.  PLC at 1100 11/20 for PICC line education.  Liver biopsy completed this AM.  R IJ access site CDI.  Pt up ad moshe, ambulating in hallway.  PRN miralax given per pt request.  LBM 11/17.  Plan to discharge to home with home infusion.  Will continue to monitor and notify Cards 2 with any concerns or questions.

## 2018-11-19 NOTE — PROGRESS NOTES
Interventional Radiology Intra-procedural Nursing Note    Patient Name: Danielito Chu  Medical Record Number: [id number]  Today's Date: November 19, 2018    Procedure: transjugular liver biopsy with portal pressure measurements    Attending MD in room during timeout: Dr Oh  Proceduralists: Dr Oh, Dr Bhatti    Procedure Start Time: 0900  Procedure Puncture time: 0900  Procedure End Time: 1010    Sedation start time: 0900  Sedation end time: 1010  Sedation medications given: versed 1.5 mg, fentanyl 75 mcg IV    Report given to: Vickie NAZARIO 6C  : not needed    D: Patient brought to IR room 4 at 0830. Verified Patient's ID using two identifiers. Informed consent obtained by Dr Bhatti. Patient's questions were answered.   A: Patient was positioned supine and was monitored per IR conscious sedation protocol. Patient tolerated the procedure without apparent incident. The dressing over the right internal jugular puncture site is clean, dry and intact. Two liver cores were sent to the Pathology lab for testing.  Patient instructed to keep his head elevated for the next two to three hours.  P: Patient returned to  for post procedure recovery and continued cares. Patient's response to post procedure education: patient verbalizes understanding but needs reinforcement due to sedation.     Coleen Del Real RN  [phone]

## 2018-11-19 NOTE — PROGRESS NOTES
S/p liver biopsy. Alert and oriented. VSS baseline. Denies pain. Walked from liter to bed. Right neck internal jugular site intact. Continue to monitor

## 2018-11-19 NOTE — PROGRESS NOTES
"D:Patient requested to see nurse.  Complained of hands being \"shakie\" and that this started off and on about \"three weeks  ago\".   Hands were visibly shaking.   Denied any visual changes or disturbances.   ERNESTO.  Strength is equal in upper and lower extremities.   Denies any numbness or tingling of upper lower extremities.  No change in sensation.   Complaines of \"pressure head ache\"  Medicated with plain tylenol.  MD Neeta on cards 2 informed of patient symptoms and negative neuro assessment.   Patient verbalized he thinks it is \"just nervousness\".   Rhythm is unchanged.  Breathing easy.    A:No acute changes.   Neuro's intact.  Rhythm is stable.  AVSS. O2 sat is normal.      P:Continue to monitor.  Continue with current plan of care.  "

## 2018-11-19 NOTE — PROGRESS NOTES
Care Coordinator Progress Note    Admission Date/Time:  11/13/2018  Attending MD:  Romaine Mondragon MD  Data  Chart reviewed, discussed with interdisciplinary team.   Patient was admitted for: Acute on chronic systolic congestive heart failure (H).    Concerns with insurance coverage for discharge needs: None.  Current Living Situation: Patient said that he lives alone but his sisterFani is available to assist him as needed.   Support System: Supportive and Involved sisterFani.   Services Involved: none currently.   Transportation at Discharge: Family or friend will provide  Transportation to Medical Appointments:   - Name of caregiver: Fani   Barriers to Discharge: medical condition.     Coordination of Care and Referrals: Provided patient/family with options for outpt infusion.    Assessment  Per MD, pt will need home Milrinone drip arranged; pt is in agreement with this plan and wants to use [facility].   Intervention:      Arrangements made with [facility] (Ph: [phone] Fax: [phone]) for Milrinone drip and PICC line care and teaching.   Appointment made with the [facility] (Ph: [phone]) on 11/20/18 at 11 AM for teaching of home Milrinone drip and PICC line care.    Plan  Anticipated Discharge Date:  TBD  Anticipated Discharge Plan:  Discharge to home with home infusion.     GILBERT SOTO RN BSN  Care Coordinator Unit   [phone]

## 2018-11-19 NOTE — PLAN OF CARE
Problem: Patient Care Overview  Goal: Plan of Care/Patient Progress Review  D: Acute on chronic systolic congestive heart failure (H)  (primary encounter diagnosis)  Milrinone therapy initiation and LVAD w/u      I: Monitored vitals and assessed pt status.       Running:Milrinone @ 0.125mcg/kg/min via PICC.          A: 2000 Labs checked K 3.8, 20meq KCl given.  VSS, on RA. SR/ST with 1st degree AVB, BBB, PVC cplt, [place] PVCs. Afebrile. Denies pain.  PCU collect for labs. Ind amb in room and dixon. Pt asking lots of questions about LVADs and success, risk vs benefit, support provided. 2L FR. Good urine output with torsemide. NPO for Liver bx in am.       P: Continue to monitor Pt status and report changes to treatment team.

## 2018-11-19 NOTE — PROGRESS NOTES
Cardiology Progress Note  Danielito Chu MRN: [id number]  Age: 63 year old, : 1955  Date: 2018          CHANGES TODAY:     - Liver biopsy with HVPG measurement  >> patient has significantly elevated HVPG, likely has cirrhosis too. Awaiting pathology result   - GI Hepatalogy consult   - continue milrinone  - touch base with care coordinator regarding the PICC line and infusion teaching class         Assessment and Plan:      63 year old male with a PMHx of Congestive Heart Failure with Reduced Ejection Fraction s/p ICD, HTN, DM2, SHIRLEY who presents with increasing dizziness, vomiting and fatigue concerning for  cardiogenic shock.       # NICM, HFpEF, decompensated    # S/P ICD ([employer] implanted 2017)  Primary Cardiologist: Dr.Rebecca June [place] numbers on admission 18 - RA-18, PA-42/30, Thu-31, PCWP-28, SvO2 - 53, CI - 1.2, CO - 2.5, Hgb - 15.7, BSA-2msq, SVR - 2304   [place] numbers on [place] removal 18: RA-14, PA - 42/22, wedge - 26, SvO2-68, CI - 1.8, CO-3.6, Hgb - 16.2, BSA-2msq, SVR - 1404 -    ---  - Inotrope: Milrinone 0.125mcg/kg/m  - Diuretic:  Torsemide 80mg BID   - Electrolyte: Scheduled 40meq BID  - ACE: HOLD  - AFTERLOAD: Hydralazine 75Q8h , isosorbide dinitrate 20mg TID  - ROSA Ag: Discontinued in setting of hyperkalemia in prior admissions.  - BB: HOLD. On inotropes   - ASA: 81 mg Qday   - Statin: Atorvastatin 10mg Qday   - ICD: [employer] implanted 18   - Strict 1.5-2L fluid restriction, Low 1gm Na diet   - LVAD work up ongoing:  S/p liver biopsy 2018    # Concern for cardiac cirrhosis  Patient underwent transjugular liver biopsy on . His HVPG was measured multiple time and they were all elevated. Max at 70 mmHg. Concerning for cirrhosis.  - GI/Hepatology consult for further risk assessment of LVAD surgery       # Depression: Restart Wellbutrin    # Gout:  R great toe.  Setting of diuresis.  On Prednisone  taper.        ACCESS: PICC (placed 11/16)  FEN: 2gm Na, 2L fluid restriction    PPX: Heparin 5000 subcutaneous Q8h    CODE: Full     FEN:  2gm Na   2L water restriction    PPX: none; patient has been ambulating well  Dispo: anticipate discharge once home IV infusion is set up  Code: Full     Patient was discussed with staff attending, Dr. Araujo, who agrees with the above assessment and plan.    Mary Esposito MD  PGY-2 Internal Medicine  Pager [phone]       Late entry - pt seen and examined on 11/19/18  I have reviewed today's vital signs, notes, medications, labs and imaging. I have also seen and examined the patient and agree with the findings and plan as outlined above.    Ashley Araujo MD  Section Head - Advanced Heart Failure, Transplantation and Mechanical Circulatory Support  Co-Director - Adult [facility]ital [facility]  Associate Professor of Medicine, [facility]           Subjective     No acute event overnight. Liver biopsy was uneventful. BP was a bit soft after coming back from liver biopsy likely d/t fentanyl.          Objective     Vitals:  Temp:  [97.6  F (36.4  C)-98.2  F (36.8  C)] 98  F (36.7  C)  Pulse:  [89] 89  Heart Rate:  [87-98] 88  Resp:  [16-18] 16  BP: ()/(64-75) 98/71  SpO2:  [93 %-100 %] 97 %  MAP: 65-92    I/O:      Vitals:    11/17/18 0400 11/18/18 0610 11/19/18 0545   Weight: 83 kg (183 lb) 82.2 kg (181 lb 4.8 oz) 80.9 kg (178 lb 4.8 oz)       Gen: AA&Ox3, no acute distress  HEENT: NCAT, anicteric sclera  Neck: no JVD  PULM/THORAX: Clear to auscultation bilaterally, no rales/stridor/wheezes  CV: irregular irregularity, systolic ejection murmurs noted at RUPSB  ABD: soft, nontender, nondistended, liver and spleen not palpable  EXT: no pitting edema    Lines: PICC line  Drips: milrenone          Data:     LABS reviewed and pertinent for:  Na 134, K 4.3, Cl 99, HCO3 29, BUN 30, Cr 1.20    IR Portogram:  IMPRESSION:  1. Transjugular  liver biopsy performed. Two core samples were obtained  and placed in formalin and sent to the laboratory for analysis.     2. Multiple hepatic wedge measurements were done and it was very high  and different each time. It was recorded as over 30 mmHg. The  portosystemic pressure gradient is over 20 mm Hg which is higher than  normal. Right atrial pressure was 10 mmHg. Free hepatic venous  pressure was 14 mmHg.           Medications     Medications    aspirin  81 mg Oral Daily     atorvastatin  10 mg Oral Daily     buPROPion  150 mg Oral Daily     heparin lock flush  5-10 mL Intracatheter Q24H     hydrALAZINE  75 mg Oral TID     iodixanol  50 mL Intravenous Once     isosorbide dinitrate  20 mg Oral TID     omeprazole  20 mg Oral QAM AC     potassium chloride  40 mEq Oral BID     predniSONE  30 mg Oral Daily    Followed by     [START ON 11/20/2018] predniSONE  20 mg Oral Daily    Followed by     [START ON 11/22/2018] predniSONE  10 mg Oral Daily     senna-docusate  1 tablet Oral At Bedtime     thiamine  100 mg Oral Daily     torsemide  80 mg Oral BID     traZODone  25 mg Oral At Bedtime       - MEDICATION INSTRUCTIONS -       heparin 5,000 units in 0.9% sodium chloride 1000 mL       milrinone 0.125 mcg/kg/min (11/18/18 1738)     Reason beta blocker order not selected

## 2018-11-20 ENCOUNTER — HOME INFUSION (PRE-WILLOW HOME INFUSION) (OUTPATIENT)
Dept: PHARMACY | Facility: CLINIC | Age: 63
End: 2018-11-20

## 2018-11-20 ENCOUNTER — APPOINTMENT (OUTPATIENT)
Dept: PHYSICAL THERAPY | Facility: CLINIC | Age: 63
DRG: 291 | End: 2018-11-20
Attending: INTERNAL MEDICINE
Payer: COMMERCIAL

## 2018-11-20 LAB
ANION GAP SERPL CALCULATED.3IONS-SCNC: 7 MMOL/L (ref 3–14)
ANION GAP SERPL CALCULATED.3IONS-SCNC: 9 MMOL/L (ref 3–14)
BUN SERPL-MCNC: 29 MG/DL (ref 7–30)
BUN SERPL-MCNC: 32 MG/DL (ref 7–30)
CALCIUM SERPL-MCNC: 9.3 MG/DL (ref 8.5–10.1)
CALCIUM SERPL-MCNC: 9.6 MG/DL (ref 8.5–10.1)
CHLORIDE SERPL-SCNC: 96 MMOL/L (ref 94–109)
CHLORIDE SERPL-SCNC: 97 MMOL/L (ref 94–109)
CO2 SERPL-SCNC: 29 MMOL/L (ref 20–32)
CO2 SERPL-SCNC: 30 MMOL/L (ref 20–32)
CREAT SERPL-MCNC: 1.14 MG/DL (ref 0.66–1.25)
CREAT SERPL-MCNC: 1.31 MG/DL (ref 0.66–1.25)
ERYTHROCYTE [DISTWIDTH] IN BLOOD BY AUTOMATED COUNT: 16.6 % (ref 10–15)
GFR SERPL CREATININE-BSD FRML MDRD: 55 ML/MIN/1.7M2
GFR SERPL CREATININE-BSD FRML MDRD: 65 ML/MIN/1.7M2
GLUCOSE BLDC GLUCOMTR-MCNC: 176 MG/DL (ref 70–99)
GLUCOSE SERPL-MCNC: 131 MG/DL (ref 70–99)
GLUCOSE SERPL-MCNC: 96 MG/DL (ref 70–99)
HCT VFR BLD AUTO: 48 % (ref 40–53)
HGB BLD-MCNC: 15.6 G/DL (ref 13.3–17.7)
MAGNESIUM SERPL-MCNC: 2.2 MG/DL (ref 1.6–2.3)
MCH RBC QN AUTO: 31.1 PG (ref 26.5–33)
MCHC RBC AUTO-ENTMCNC: 32.5 G/DL (ref 31.5–36.5)
MCV RBC AUTO: 96 FL (ref 78–100)
PLATELET # BLD AUTO: 178 10E9/L (ref 150–450)
POTASSIUM SERPL-SCNC: 3.5 MMOL/L (ref 3.4–5.3)
POTASSIUM SERPL-SCNC: 3.6 MMOL/L (ref 3.4–5.3)
RBC # BLD AUTO: 5.02 10E12/L (ref 4.4–5.9)
SODIUM SERPL-SCNC: 134 MMOL/L (ref 133–144)
SODIUM SERPL-SCNC: 135 MMOL/L (ref 133–144)
WBC # BLD AUTO: 9.7 10E9/L (ref 4–11)

## 2018-11-20 PROCEDURE — 36415 COLL VENOUS BLD VENIPUNCTURE: CPT | Performed by: INTERNAL MEDICINE

## 2018-11-20 PROCEDURE — 83735 ASSAY OF MAGNESIUM: CPT | Performed by: INTERNAL MEDICINE

## 2018-11-20 PROCEDURE — 25000132 ZZH RX MED GY IP 250 OP 250 PS 637: Performed by: INTERNAL MEDICINE

## 2018-11-20 PROCEDURE — 80048 BASIC METABOLIC PNL TOTAL CA: CPT | Performed by: INTERNAL MEDICINE

## 2018-11-20 PROCEDURE — 25000132 ZZH RX MED GY IP 250 OP 250 PS 637: Performed by: STUDENT IN AN ORGANIZED HEALTH CARE EDUCATION/TRAINING PROGRAM

## 2018-11-20 PROCEDURE — 97116 GAIT TRAINING THERAPY: CPT | Mod: GP | Performed by: PHYSICAL THERAPIST

## 2018-11-20 PROCEDURE — 40000193 ZZH STATISTIC PT WARD VISIT: Performed by: PHYSICAL THERAPIST

## 2018-11-20 PROCEDURE — 25000128 H RX IP 250 OP 636: Performed by: STUDENT IN AN ORGANIZED HEALTH CARE EDUCATION/TRAINING PROGRAM

## 2018-11-20 PROCEDURE — 25000125 ZZHC RX 250: Performed by: STUDENT IN AN ORGANIZED HEALTH CARE EDUCATION/TRAINING PROGRAM

## 2018-11-20 PROCEDURE — 97530 THERAPEUTIC ACTIVITIES: CPT | Mod: GP | Performed by: PHYSICAL THERAPIST

## 2018-11-20 PROCEDURE — 21400006 ZZH R&B CCU INTERMEDIATE UMMC

## 2018-11-20 PROCEDURE — 99232 SBSQ HOSP IP/OBS MODERATE 35: CPT | Mod: GC | Performed by: INTERNAL MEDICINE

## 2018-11-20 PROCEDURE — 85027 COMPLETE CBC AUTOMATED: CPT | Performed by: INTERNAL MEDICINE

## 2018-11-20 PROCEDURE — 00000146 ZZHCL STATISTIC GLUCOSE BY METER IP

## 2018-11-20 RX ORDER — MILRINONE LACTATE 0.2 MG/ML
0.12 INJECTION, SOLUTION INTRAVENOUS CONTINUOUS
Qty: 1000 ML | Refills: 3 | Status: ON HOLD | OUTPATIENT
Start: 2018-11-20 | End: 2018-12-17

## 2018-11-20 RX ADMIN — POTASSIUM CHLORIDE 40 MEQ: 750 TABLET, EXTENDED RELEASE ORAL at 19:33

## 2018-11-20 RX ADMIN — HYDRALAZINE HYDROCHLORIDE 75 MG: 50 TABLET ORAL at 13:53

## 2018-11-20 RX ADMIN — TORSEMIDE 80 MG: 20 TABLET ORAL at 08:29

## 2018-11-20 RX ADMIN — THIAMINE HCL TAB 100 MG 100 MG: 100 TAB at 08:31

## 2018-11-20 RX ADMIN — PREDNISONE 20 MG: 20 TABLET ORAL at 08:29

## 2018-11-20 RX ADMIN — ISOSORBIDE DINITRATE 20 MG: 20 TABLET ORAL at 13:53

## 2018-11-20 RX ADMIN — Medication 25 MG: at 21:39

## 2018-11-20 RX ADMIN — TORSEMIDE 80 MG: 20 TABLET ORAL at 16:27

## 2018-11-20 RX ADMIN — BUPROPION HYDROCHLORIDE 150 MG: 150 TABLET, FILM COATED, EXTENDED RELEASE ORAL at 08:28

## 2018-11-20 RX ADMIN — POTASSIUM CHLORIDE 20 MEQ: 750 TABLET, EXTENDED RELEASE ORAL at 21:39

## 2018-11-20 RX ADMIN — ISOSORBIDE DINITRATE 20 MG: 20 TABLET ORAL at 08:30

## 2018-11-20 RX ADMIN — ATORVASTATIN CALCIUM 10 MG: 10 TABLET, FILM COATED ORAL at 08:29

## 2018-11-20 RX ADMIN — ISOSORBIDE DINITRATE 20 MG: 20 TABLET ORAL at 19:33

## 2018-11-20 RX ADMIN — OMEPRAZOLE 20 MG: 20 CAPSULE, DELAYED RELEASE ORAL at 08:30

## 2018-11-20 RX ADMIN — MILRINONE LACTATE 0.12 MCG/KG/MIN: 200 INJECTION, SOLUTION INTRAVENOUS at 15:20

## 2018-11-20 RX ADMIN — SODIUM CHLORIDE, PRESERVATIVE FREE 5 ML: 5 INJECTION INTRAVENOUS at 19:33

## 2018-11-20 RX ADMIN — POTASSIUM CHLORIDE 40 MEQ: 750 TABLET, EXTENDED RELEASE ORAL at 08:28

## 2018-11-20 RX ADMIN — ASPIRIN 81 MG: 81 TABLET, COATED ORAL at 08:30

## 2018-11-20 RX ADMIN — POTASSIUM CHLORIDE 20 MEQ: 750 TABLET, EXTENDED RELEASE ORAL at 13:53

## 2018-11-20 RX ADMIN — HYDRALAZINE HYDROCHLORIDE 75 MG: 50 TABLET ORAL at 08:31

## 2018-11-20 RX ADMIN — HYDRALAZINE HYDROCHLORIDE 75 MG: 50 TABLET ORAL at 19:33

## 2018-11-20 ASSESSMENT — ACTIVITIES OF DAILY LIVING (ADL)
ADLS_ACUITY_SCORE: 9

## 2018-11-20 NOTE — PLAN OF CARE
Problem: Patient Care Overview  Goal: Discharge Needs Assessment  11/20/18 4582     Neeta Guthrie-Registered Nurse (Nursing)  Patient and sister, Fani, seen at bedside for Non-valved(open ended) PICC cares and use of CADD Powell infusion pump. Both RD correctly on model with flushing unused lumen, cap change and changing bag on CADD powell infusion pump. Pt. Had many questions. Has some tremors in both hands but did not contaminate any supplies. Pt. States he learns best with pictures rather than written material due to poor eye sight. Does wear glasses. Review of all cares scheduled for 11/21 @ 3pm if patient and sister want review before discharge. Fani states she works during the day and a 4-5pm bag change would work best, if possible.

## 2018-11-20 NOTE — PLAN OF CARE
"Problem: Patient Care Overview  Goal: Plan of Care/Patient Progress Review  Discharge Planner PT   Patient plan for discharge: home  Current status: independent with transfers, ambulates 700 ft with CGA, one LOB requiring min A. Discussed use of 4WW for community mobility, pt states he does not want to use 4WW to \"maintian his dignity/quality of life with the time he has left. Issued LE exercises for strengthening HEP.   Barriers to return to prior living situation: impaired balance  Recommendations for discharge: home with HEP   Rationale for recommendations: improve strength in LEs prior to possible re-admission pending LVAD work-up.        Entered by: Donna Mason 11/20/2018 3:23 PM           "

## 2018-11-20 NOTE — PROGRESS NOTES
Cardiology Progress Note  Danielito Chu MRN: [id number]  Age: 63 year old, : 1955  Date: 2018          CHANGES TODAY:     - Seen by Hepatology today; Hepatology prefers to wait for the biopsy result; will need to TBW Hepatology in AM regarding the path result  - Home infusion teaching class today  - Plan to discharge home in AM; need co-ordination with Hepatology prior to discharge         Assessment and Plan:      63 year old male with a PMHx of Congestive Heart Failure with Reduced Ejection Fraction s/p ICD, HTN, DM2, SHIRLEY who presents with increasing dizziness, vomiting and fatigue concerning for  cardiogenic shock.       # NICM, HFpEF, decompensated    # S/P ICD ([employer] implanted 2017)  Primary Cardiologist: Dr.Rebecca June Wayne numbers on admission 18 - RA-18, PA-42/30, Thu-31, PCWP-28, SvO2 - 53, CI - 1.2, CO - 2.5, Hgb - 15.7, BSA-2msq, SVR - 2304   [place] numbers on [place] removal 18: RA-14, PA - 42/22, wedge - 26, SvO2-68, CI - 1.8, CO-3.6, Hgb - 16.2, BSA-2msq, SVR - 1404 -    ---  - Inotrope: Milrinone 0.125mcg/kg/m  - Diuretic:  Torsemide 80mg BID   - Electrolyte: Scheduled 40meq BID  - ACE: HOLD  - AFTERLOAD: Hydralazine 75Q8h , isosorbide dinitrate 20mg TID  - ROSA Ag: Discontinued in setting of hyperkalemia in prior admissions.  - BB: HOLD. 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 His HVPG was measured multiple time and they were all elevated. Max at 70 mmHg. Concerning for cirrhosis.  - Seen by Hepatology today; Hepatology prefers to wait for the biopsy result as the pressure does not correlate with his clinical picture      # Depression: Continue Wellbutrin     # Gout:  R great toe.  Setting of diuresis.  On Prednisone taper.      FEN:  2gm Na   2L water restriction    PPX: none; patient has been ambulating well  Dispo: anticipate discharge once home IV infusion is set up  Code: Full     Patient was discussed with staff attending, Dr. Araujo, who agrees with the above assessment and plan.    Mary Esposito MD  PGY-2 Internal Medicine  Pager [phone]       Late entry - pt seen and examined on 11/20/18  I have reviewed today's vital signs, notes, medications, labs and imaging. I have also seen and examined the patient and agree with the findings and plan as outlined above.    Ashley Araujo MD  Section Head - Advanced Heart Failure, Transplantation and Mechanical Circulatory Support  Co-Director - Adult Congenital and Cardiovascular Genetics Center  Associate Professor of Medicine, [facility]             Subjective     No acute event overnight. Patient has concern if the liver biopsy result will affect his VAD candidacy. Told patient that it is possible but we cannot say it for sure until the biopsy result is back. Otherwise, no new complaints.           Objective     Vitals:  Temp:  [97.6  F (36.4  C)-98.2  F (36.8  C)] 98.2  F (36.8  C)  Pulse:  [99] 99  Heart Rate:  [] 119  Resp:  [14-18] 16  BP: ()/(38-90) 115/90  SpO2:  [94 %-97 %] 97 %  MAP: 86-98    I/O:      Vitals:    11/18/18 0610 11/19/18 0545 11/20/18 0523   Weight: 82.2 kg (181 lb 4.8 oz) 80.9 kg (178 lb 4.8 oz) 81.2 kg (179 lb)     Gen: AA&Ox3, no acute distress  HEENT: NCAT, anicteric sclera  Neck: no JVD  PULM/THORAX: Clear to auscultation bilaterally, no rales/stridor/wheezes  CV: irregular irregularity, systolic ejection murmurs noted at RUPSB  ABD: soft, nontender, nondistended, liver and spleen not palpable  EXT: no pitting edema    Lines: PICC line  Drips: milrenone          Data:     LABS reviewed and pertinent for:  Na 134, K 3.5, Cl 96, HCO3 30, BUN 29, Cr  1.14  WBC 9.7, Hgb 15.6, Plt 178           Medications     Medications    aspirin  81 mg Oral Daily     atorvastatin  10 mg Oral Daily     buPROPion  150 mg Oral Daily     heparin lock flush  5-10 mL Intracatheter Q24H     hydrALAZINE  75 mg Oral TID     isosorbide dinitrate  20 mg Oral TID     omeprazole  20 mg Oral QAM AC     potassium chloride  40 mEq Oral BID     predniSONE  20 mg Oral Daily    Followed by     [START ON 11/22/2018] predniSONE  10 mg Oral Daily     senna-docusate  1 tablet Oral At Bedtime     thiamine  100 mg Oral Daily     torsemide  80 mg Oral BID     traZODone  25 mg Oral At Bedtime       - MEDICATION INSTRUCTIONS -       milrinone 0.125 mcg/kg/min (11/20/18 4577)     Reason beta blocker order not selected

## 2018-11-20 NOTE — PLAN OF CARE
Problem: Patient Care Overview  Goal: Plan of Care/Patient Progress Review  Outcome: No Change  D: Patient transferred 11/13 from OSH with concern for cardiogenic shock. LVAD w/u. Hx.CHF, s/p ICD, HTN, DM2, SHIRLEY, depression.  I/A: A&Ox4. VSS on RA. SR 80s-90s with occ/freq. PVCs (10 beat run of VT HR 160s at 2350, pt. asymptomatic). Denies pain. Milrinone infusing at 0.125 mcg/kg/min. Adequate uop. Up independently. Appeared to rest comfortably overnight.   P: PICC and infusion teaching at 11 am today. Plan to discharge home on milrinone. LVAD w/u continues. GI/hepatology to consult. Continue to monitor and notify Cards 2 with questions/concerns.

## 2018-11-20 NOTE — PROGRESS NOTES
Care Coordinator - Discharge Planning    Admission Date/Time:  11/13/2018  Attending MD:  Romaine Mondragon MD   Data  Date of initial CC assessment:  11/19/18  Chart reviewed, discussed with interdisciplinary team.   Patient was admitted for:   1. Acute on chronic systolic congestive heart failure (H)    Assessment   Full assessment completed in previous note   Concerns with insurance coverage for discharge needs: None.  Current Living Situation: Patient said that he lives alone but his sisterFani is available to assist him as needed.   Support System: Supportive and Involved sisterFani.   Services Involved: none currently.   Transportation at Discharge: Family or friend will provide  Transportation to Medical Appointments:   - Name of caregiver: Fani   Barriers to Discharge: medical condition.     Coordination of Care and Referrals: Provided patient/family with options for outpt infusion and new PCP appt.       Per [facility], pt will not have a home care agency for home visits until next week so pt will need to be seen at his local clinic on Friday for a PICC line dressing change. Pt in agreement with this plan and said that his PCP at [facility] has retired and he wants to establish care with a new PCP.   Intervention:       Appointment made at [facility] (Ph: [phone] Fax: [phone]) with Dr. Bryan Orellana on 11/23/18 at 12:45 PM for PICC line dressing change, establishment of care, post hospitalization follow up.       Arrangements made with [facility] (Ph: [phone] Fax: [phone]) for Milrinone drip and PICC line care and teaching.     Plan  Anticipated Discharge Date:  11/21/18  Anticipated Discharge Plan:  Discharge to home with home infusion.     GILBERT SOTO RN BSN  Care Coordinator Unit   [phone]

## 2018-11-20 NOTE — PROGRESS NOTES
Addendum 11/21/18  1300  Danielito is discharging today and requires hook up and additional teaching for his daily milrinone bag changes.  I met with Danielito and his sister Fani.  Order, medication label and pump settings verified.  Fani took on the primary role of reading instructions and guiding Fabiano while he did the process hands on.     Fani displayed a very good understanding of the process and moved along a little too quickly for Fabiano.  I returned again after Fabiano had his EGD and went through the bag change process again.   Fani took on the primary role this time for the hands on process and did very well with the bag change.   She understood not only the process but the concepts and will continue to work with and assist Fabiano.  She stated she feels comfortable with changing out the bag independently tomorrow.  I had Fabiano flush his dormant lumen with heparin and reminded them it needs to be done q 24 hrs and to never flush the milrinone lumen.    Fabiano will have his PICC dressing change done in clinic on Fri when he sets up primary care and [facility] will start seeing Fabiano early next week.  Fabiano's home milrinone is running and he is ready for discharge from [facility] perspective.    Addendum 11/20/18  1400  Fabiano will not be discharging until tomorrow.   I will revisit with him to confirm what time he would like to leave tomorrow and arrange for hook up and additional teaching tomorrow.    Home Infusion  Danielito is expected to dc today and will be going home on continuous IV milrinone.  He has never done home IV therapy before however he and his sister Fani have an appt at the [facility] this morning for initial teaching.  Met with Fabiano and Fani at bedside and provided them with information about [facility] services.  Explained about administration method, the pump (and back up pump), medication bag and amarjit pack used.   I informed them that I will assist with hook up of the home medication here at the hospital before he goes and  provide additional teaching so that he can be independent with his daily bag changes and daily flushing of dormant lumen.   Informed them about supplies and ongoing deliveries, storage of medication, continuous infusion requirements, plan for SNV and 24/7 availability of I staff while on IV therapy.   Fabiano will receive home nursing through [facility] however they will not be able to see him until early next week.  Fabiano and Fani verbalized understanding of all information given.   They are willing and able to learn and manage home IV therapy.  Questions answered.  Will return once supplies arrive to assist with hook up of home milrinone.    Anne Alejandre RN KATE  [facility] Liaison  [phone] pager

## 2018-11-20 NOTE — PLAN OF CARE
Problem: Patient Care Overview  Goal: Plan of Care/Patient Progress Review  OT6C: OT orders received and acknowledged. Per discussion with PT pt with no acute OT needs at this time, OT to defer and complete orders at this time.

## 2018-11-20 NOTE — CONSULTS
Hepatology Consultation    Danielito Chu   MRN# [id number]     Age: 63 year old YOB: 1955       Referring provider: Romaine Mondragon  Attending Hepatologist: Dr. Horne  Consult requested for:  Possible cirrhosis.        Assessment and Recommendation:   Assessment:   Mr. Chu is a 63-year-old with a history of HFrEF with EF 10-20%, s/p ICD placement, SHIRLEY, HTN, DM II who was admitted with fatigue and candidate for LVAD. During evalution, he was noted to have evidence of possible advanced liver disease.       Recommendations:   1. Advanced liver disease, Cardiac hepatopathy v YOUNG  - Portogram from 11/19 did have elevated HVPG without other evidence of portal hypertension. Awaiting liver biopsy for further information.   - Should he have cirrhosis on liver biopsy, he may need EGD for variceal screening prior to surgery.   - Recent US does not have evidence of HCC.   - MELD 15    Plan of care discussed with Dr. Horne    Thank you for the opportunity to be involved in Danielito Chu care. Please call with any questions or concerns.     JOSE A Pena, CNP  Inpatient Hepatology [facility]  [phone]               History of Present Illness:   Danielito Chu is a 63 year old male with a history of HFrEF with EF 10/20%, s/p ICD. His heart disease has been complicated by HTN, DM II, SHIRLEY and fatigue. He was admitted with dizziness and malaise on 11/14. Due to decline in his heart function, he is being evaluated for LVAD. His abdominal imaging does have concerns for advanced liver disease and he underwent a venogram with high HVPG on 11/19. A liver biopsy was also obtained and results pending. He denies ever having concerns for cirrhosis. He has been having increasing fatigue which he reports is common when he has a CHF exacerbation and weight gain. He denies ever requiring a paracentesis and notes that his abdominal distention and fullness resolve with diuretics.     He did have an US in 10/2018  that demonstrated heterogenous appearance with nodular contour. He denies any alcohol use in the past and does not have risk factors for HCV. He notes have some muscle loss due to a decrease in activity. He has also has had decrease in oral intake with changes in taste. He is up to date on his colonoscopy and denies any melena, hematochezia or hematemesis.              Past Medical History:     Past Medical History:   Diagnosis Date     Acute systolic congestive heart failure (H) 4/5/2017     Diabetes (H)      HTN (hypertension)      Hyperlipidemia      Mitral regurgitation      Nocturnal oxygen desaturation 3/29/2018     Nonischemic cardiomyopathy (H) 4/5/2017     SHIRLEY (obstructive sleep apnea)      Pacemaker 04/07/2017    ICD 4/7/17              Past Surgical History:     Past Surgical History:   Procedure Laterality Date     IMPLANT IMPLANTABLE CARDIOVERTER DEFIBRILLATOR                Social History:     Social History   Substance Use Topics     Smoking status: Former Smoker     Smokeless tobacco: Never Used      Comment: quit 3/2017     Alcohol use Not on file      Comment: social             Family History:   The family history includes Coronary Artery Disease in his mother; Heart Failure in his father and paternal uncle; Myocardial Infarction in his paternal uncle.             Immunizations:     Immunization History   Administered Date(s) Administered     Influenza (High Dose) 3 valent vaccine 10/19/2017, 10/16/2018     Pneumococcal 23 valent 04/08/2017            Allergies:   No Known Allergies          Medications:   @  Prescriptions Prior to Admission   Medication Sig Dispense Refill Last Dose     artificial saliva (BIOTENE DRY MOUTHWASH) LIQD liquid Swish and spit 10 mLs in mouth 4 times daily as needed for dry mouth 59 mL 0 Taking     aspirin EC 81 MG EC tablet Take 1 tablet (81 mg) by mouth daily 30 tablet 3 11/13/2018 at 085     atorvastatin (LIPITOR) 10 MG tablet Take 1 tablet (10 mg) by mouth daily 90  tablet 3 11/13/2018 at 08     Blood Glucose Monitoring Suppl (BLOOD GLUCOSE MONITOR SYSTEM) w/Device KIT three times a week        buPROPion (WELLBUTRIN XL) 150 MG 24 hr tablet Take 1 tablet (150 mg) by mouth daily (Patient not taking: Reported on 11/13/2018) 60 tablet 0 Not Taking     digoxin (LANOXIN) 250 MCG tablet TAKE 1 TABLET (250 MCG) BY MOUTH DAILY 90 tablet 3 11/13/2018 at 08     lisinopril (PRINIVIL/ZESTRIL) 5 MG tablet Take 1 tablet (5 mg) by mouth daily 90 tablet 1      metFORMIN (GLUCOPHAGE) 500 MG tablet Take 1 tablet (500 mg) by mouth 2 times daily (with meals)   Not Taking     Potassium Chloride ER 20 MEQ TBCR Take 1 tablet (20 mEq) by mouth daily 90 tablet 1 11/12/2018 at 2000     sildenafil (VIAGRA) 50 MG tablet Take 1 tablet (50 mg) by mouth daily as needed (Patient not taking: Reported on 10/16/2018) 20 tablet 1 Not Taking     thiamine 100 MG tablet Take 1 tablet (100 mg) by mouth daily 60 tablet 0 11/13/2018 at 08     torsemide (DEMADEX) 20 MG tablet Take 60mg in the am and 40mg in the pm 180 tablet 0 11/13/2018 at 08     traZODone (DESYREL) 100 MG tablet TAKE 1 TABLET BY MOUTH AT BEDTIME AS NEEDED FOR SLEEP 30 tablet 1 11/12/2018 at 2200   @          Review of Systems:    ROS: 10 point ROS neg other than the symptoms noted above in the HPI.          Physical Exam:   Blood pressure 109/77, pulse 99, temperature 97.6  F (36.4  C), temperature source Oral, resp. rate 18, weight 81.2 kg (179 lb), SpO2 96 %. Body mass index is 26.43 kg/(m^2).    Intake/Output Summary (Last 24 hours) at 11/20/18 1209  Last data filed at 11/20/18 0659   Gross per 24 hour   Intake           982.25 ml   Output             2325 ml   Net         -1342.75 ml     General: In no acute distress, no facial muscle wasting  Neuro: AOx3, No asterixis  HEENT: PERRL Noscleral icterus, Nooral lesions  Lymph:  Nocervical lymphadenoapthy  CV: S1/S2 with gr 2/6 murmurs, Skin warm and dry. No HJR  Lungs: clear to auscultation  Respirations even and nonlabored on room air  Abd: Nondistended, nontender. No hepatomegaly. +BS  Extrem: Noperipehral edema  Skin: Nojaundice  Psych: pleasant         Data:     Lab Results   Component Value Date    WBC 9.7 11/20/2018     Lab Results   Component Value Date    RBC 5.02 11/20/2018     Lab Results   Component Value Date    HGB 15.6 11/20/2018     Lab Results   Component Value Date    HCT 48.0 11/20/2018     No components found for: MCT  Lab Results   Component Value Date    MCV 96 11/20/2018     Lab Results   Component Value Date    MCH 31.1 11/20/2018     Lab Results   Component Value Date    MCHC 32.5 11/20/2018     Lab Results   Component Value Date    RDW 16.6 11/20/2018     Lab Results   Component Value Date     11/20/2018       Last Basic Metabolic Panel:  Lab Results   Component Value Date     11/20/2018      Lab Results   Component Value Date    POTASSIUM 3.5 11/20/2018     Lab Results   Component Value Date    CHLORIDE 96 11/20/2018     Lab Results   Component Value Date    ASHLEE 9.6 11/20/2018     Lab Results   Component Value Date    CO2 30 11/20/2018     Lab Results   Component Value Date    BUN 29 11/20/2018     Lab Results   Component Value Date    CR 1.14 11/20/2018     Lab Results   Component Value Date    GLC 96 11/20/2018       Liver Function Studies -   Recent Labs   Lab Test  11/13/18 2119   PROTTOTAL  8.4   ALBUMIN  4.1   BILITOTAL  2.0*   ALKPHOS  92   AST  17   ALT  17       Lab Results   Component Value Date    INR 1.52 11/15/2018       MELD-Na score: 15 at 11/15/2018  4:55 PM  MELD score: 14 at 11/15/2018  4:55 PM  Calculated from:  Serum Creatinine: 1.01 mg/dL at 11/15/2018  4:55 PM  Serum Sodium: 136 mmol/L at 11/15/2018  4:55 PM  Total Bilirubin: 2.0 mg/dL at 11/13/2018  9:19 PM  INR(ratio): 1.52 at 11/15/2018  4:31 AM  Age: 63 years           Previous Endoscopy:   No results found for this or any previous visit.      IMAGING:  US abd 10/23/18  IMPRESSION:       1. Stable cirrhotic appearance of the liver without sonographic  evidence of portal hypertension.  2. Nonaneurysmal abdominal aorta.  3. Nonobstructing stone in the inferior pole of the left kidney.    IR portogram 11/19/18  IMPRESSION:  1. Transjugular liver biopsy performed. Two core samples were obtained  and placed in formalin and sent to the laboratory for analysis.     2. Multiple hepatic wedge measurements were done and it was very high  and different each time. It was recorded as over 30 mmHg. The  portosystemic pressure gradient is over 20 mm Hg which is higher than  normal. Right atrial pressure was 10 mmHg. Free hepatic venous  pressure was 14 mmHg.    ECHO 11/14/18  Interpretation Summary  Limited study. Severely dilated LV with severely reduced LV systolic function,  LVEF=10-15% (traced 12%.)  Valves were not assessed on this limited study.  Mildly dilated RV with mildly reduced function.  Dilation of the inferior vena cava is present with abnormal respiratory  variation in diameter. Estimated mean right atrial pressure is 15 mmHg  (significantly elevated).  This study was compared to a TTE from 10/23/2018: There has been no  significant change as assessed.

## 2018-11-21 ENCOUNTER — HOME INFUSION (PRE-WILLOW HOME INFUSION) (OUTPATIENT)
Dept: PHARMACY | Facility: CLINIC | Age: 63
End: 2018-11-21

## 2018-11-21 ENCOUNTER — PRE VISIT (OUTPATIENT)
Dept: CARDIOLOGY | Facility: CLINIC | Age: 63
End: 2018-11-21

## 2018-11-21 VITALS
DIASTOLIC BLOOD PRESSURE: 80 MMHG | RESPIRATION RATE: 19 BRPM | TEMPERATURE: 97.5 F | OXYGEN SATURATION: 96 % | WEIGHT: 176.7 LBS | HEART RATE: 98 BPM | BODY MASS INDEX: 26.09 KG/M2 | SYSTOLIC BLOOD PRESSURE: 116 MMHG

## 2018-11-21 DIAGNOSIS — I50.22 CHRONIC SYSTOLIC HEART FAILURE (H): Primary | ICD-10-CM

## 2018-11-21 LAB
ANION GAP SERPL CALCULATED.3IONS-SCNC: 8 MMOL/L (ref 3–14)
BUN SERPL-MCNC: 30 MG/DL (ref 7–30)
CALCIUM SERPL-MCNC: 9.6 MG/DL (ref 8.5–10.1)
CHLORIDE SERPL-SCNC: 99 MMOL/L (ref 94–109)
CO2 SERPL-SCNC: 29 MMOL/L (ref 20–32)
COPATH REPORT: NORMAL
CREAT SERPL-MCNC: 1.21 MG/DL (ref 0.66–1.25)
GFR SERPL CREATININE-BSD FRML MDRD: 61 ML/MIN/1.7M2
GLUCOSE SERPL-MCNC: 100 MG/DL (ref 70–99)
MAGNESIUM SERPL-MCNC: 2.3 MG/DL (ref 1.6–2.3)
POTASSIUM SERPL-SCNC: 3.7 MMOL/L (ref 3.4–5.3)
SODIUM SERPL-SCNC: 136 MMOL/L (ref 133–144)
UPPER GI ENDOSCOPY: NORMAL

## 2018-11-21 PROCEDURE — 25000125 ZZHC RX 250: Performed by: INTERNAL MEDICINE

## 2018-11-21 PROCEDURE — 83735 ASSAY OF MAGNESIUM: CPT | Performed by: INTERNAL MEDICINE

## 2018-11-21 PROCEDURE — 25000132 ZZH RX MED GY IP 250 OP 250 PS 637: Performed by: STUDENT IN AN ORGANIZED HEALTH CARE EDUCATION/TRAINING PROGRAM

## 2018-11-21 PROCEDURE — 80048 BASIC METABOLIC PNL TOTAL CA: CPT | Performed by: INTERNAL MEDICINE

## 2018-11-21 PROCEDURE — 25000125 ZZHC RX 250: Performed by: STUDENT IN AN ORGANIZED HEALTH CARE EDUCATION/TRAINING PROGRAM

## 2018-11-21 PROCEDURE — 43235 EGD DIAGNOSTIC BRUSH WASH: CPT | Performed by: INTERNAL MEDICINE

## 2018-11-21 PROCEDURE — 25000132 ZZH RX MED GY IP 250 OP 250 PS 637: Performed by: INTERNAL MEDICINE

## 2018-11-21 PROCEDURE — 99239 HOSP IP/OBS DSCHRG MGMT >30: CPT | Mod: GC | Performed by: INTERNAL MEDICINE

## 2018-11-21 PROCEDURE — G0500 MOD SEDAT ENDO SERVICE >5YRS: HCPCS | Performed by: INTERNAL MEDICINE

## 2018-11-21 PROCEDURE — 25000128 H RX IP 250 OP 636: Performed by: INTERNAL MEDICINE

## 2018-11-21 RX ORDER — TORSEMIDE 20 MG/1
80 TABLET ORAL 2 TIMES DAILY
Qty: 180 TABLET | Refills: 0 | Status: SHIPPED | OUTPATIENT
Start: 2018-11-21 | End: 2018-11-26

## 2018-11-21 RX ORDER — FENTANYL CITRATE 50 UG/ML
INJECTION, SOLUTION INTRAMUSCULAR; INTRAVENOUS PRN
Status: DISCONTINUED | OUTPATIENT
Start: 2018-11-21 | End: 2018-11-21 | Stop reason: HOSPADM

## 2018-11-21 RX ORDER — POTASSIUM CHLORIDE 1500 MG/1
60 TABLET, EXTENDED RELEASE ORAL 2 TIMES DAILY
Qty: 180 TABLET | Refills: 11 | Status: SHIPPED | OUTPATIENT
Start: 2018-11-21 | End: 2018-11-26

## 2018-11-21 RX ORDER — HYDRALAZINE HYDROCHLORIDE 25 MG/1
75 TABLET, FILM COATED ORAL 3 TIMES DAILY
Qty: 120 TABLET | Refills: 3 | Status: ON HOLD | OUTPATIENT
Start: 2018-11-21 | End: 2018-12-17

## 2018-11-21 RX ORDER — ISOSORBIDE DINITRATE 20 MG/1
20 TABLET ORAL 3 TIMES DAILY
Qty: 90 TABLET | Refills: 2 | Status: ON HOLD | OUTPATIENT
Start: 2018-11-21 | End: 2018-12-17

## 2018-11-21 RX ORDER — PREDNISONE 10 MG/1
10 TABLET ORAL DAILY
Qty: 2 TABLET | Refills: 0 | Status: SHIPPED | OUTPATIENT
Start: 2018-11-22 | End: 2019-06-07

## 2018-11-21 RX ADMIN — POTASSIUM CHLORIDE 40 MEQ: 750 TABLET, EXTENDED RELEASE ORAL at 09:17

## 2018-11-21 RX ADMIN — POTASSIUM CHLORIDE 20 MEQ: 750 TABLET, EXTENDED RELEASE ORAL at 11:44

## 2018-11-21 RX ADMIN — PREDNISONE 20 MG: 20 TABLET ORAL at 09:18

## 2018-11-21 RX ADMIN — TORSEMIDE 80 MG: 20 TABLET ORAL at 09:17

## 2018-11-21 RX ADMIN — ATORVASTATIN CALCIUM 10 MG: 10 TABLET, FILM COATED ORAL at 09:18

## 2018-11-21 RX ADMIN — HYDRALAZINE HYDROCHLORIDE 75 MG: 50 TABLET ORAL at 09:21

## 2018-11-21 RX ADMIN — BUPROPION HYDROCHLORIDE 150 MG: 150 TABLET, FILM COATED, EXTENDED RELEASE ORAL at 09:18

## 2018-11-21 RX ADMIN — ASPIRIN 81 MG: 81 TABLET, COATED ORAL at 09:18

## 2018-11-21 RX ADMIN — THIAMINE HCL TAB 100 MG 100 MG: 100 TAB at 09:19

## 2018-11-21 RX ADMIN — ISOSORBIDE DINITRATE 20 MG: 20 TABLET ORAL at 09:30

## 2018-11-21 RX ADMIN — OMEPRAZOLE 20 MG: 20 CAPSULE, DELAYED RELEASE ORAL at 09:17

## 2018-11-21 ASSESSMENT — ACTIVITIES OF DAILY LIVING (ADL)
ADLS_ACUITY_SCORE: 9

## 2018-11-21 NOTE — PROGRESS NOTES
DISCHARGE   Discharged to: Home  Via: Automobile  Accompanied by: Family, sister Fani  Discharge Instructions: diet, activity, medications, follow up appointments, when to call the MD, and what to watchout for (i.e. s/s of infection, increasing SOB, palpitations, chest pain,)  Prescriptions: To be filled by  [facility]      pharmacy per pt's request; medication list reviewed & sent with pt .  Education review per [facility] [place] home infusion.  Patient switched to home infusion pump per [facility] Rod[facility].   Patient and sister's questions and concerns were addressed.   Ready to discharge to home   Follow Up Appointments: arranged; information given  Belongings: All sent with pt  IV: out  Telemetry: off  Pt exhibits understanding of above discharge instructions; all questions answered.  Discharge Paperwork: faxed

## 2018-11-21 NOTE — OR NURSING
Pt tolerated EGD well. Report called to pts 6C RN. Will continue to monitor pt closely while awaiting transport.

## 2018-11-21 NOTE — PLAN OF CARE
Problem: Patient Care Overview  Goal: Plan of Care/Patient Progress Review    D: Patient admitted with cardiogenic shock. Milrinone initiated with plan to discharge to home tomorrow.  I: Maintained milrinone@0.125mcg/kg/min. Aleksander afternoon BMP and gave supplemental Kcl in addition to 2000 dose.  A: SR with PVCs, no VT noted this shift. PICC line patent for infusion and blood draws. Patient with good UO in response to oral torsemide.  P: [facility] to deliver CADD pump tomorrow with plans for discharge to home. PLC review of infusion/line cares arranged for 3pm tomorrow.

## 2018-11-21 NOTE — PROGRESS NOTES
[facility]  HEPATOLOGY PROGRESS NOTE  Danielito Chu [id number]       CC: fatigue  SUBJECTIVE:  Stable and has now had teaching on IV milrinone. He denies abdominal pain, increased fatigue, distention, dyspnea, melena or hematochezia.     ROS:  A 10-point review of systems was negative.    OBJECTIVE:  VS: /80 (BP Location: Left arm)  Pulse 99  Temp 97.6  F (36.4  C) (Oral)  Resp 16  Wt 80.2 kg (176 lb 11.2 oz)  SpO2 97%  BMI 26.09 kg/m2   General: In no acute distress, no facial muscle wasting  Neuro: AOx3, No asterixis  Lymph: No cervical lymphadenopathy  HEENT:  Noscleral icterus, Nooral lesions  CV:  Skin warm and dry  Lungs:  Respirations even and nonlabored on room air  Abd: Nondistended, nontender  Extrem: Noperipehral edema  Skin: no jaundice  Psych: pleasant    MEDICATIONS:  Current Facility-Administered Medications   Medication     acetaminophen (TYLENOL) tablet 650 mg     artificial saliva (BIOTENE DRY MOUTHWASH) liquid 10 mL     aspirin EC tablet 81 mg     atorvastatin (LIPITOR) tablet 10 mg     buPROPion (WELLBUTRIN XL) 24 hr tablet 150 mg     Continuing ACE inhibitor/ARB/ARNI from home medication list OR ACE inhibitor/ARB/ARNI order already placed during this visit     glucose gel 15-30 g    Or     dextrose 50 % injection 25-50 mL    Or     glucagon injection 1 mg     heparin lock flush 10 UNIT/ML injection 2-5 mL     heparin lock flush 10 UNIT/ML injection 5-10 mL     heparin lock flush 10 UNIT/ML injection 5-10 mL     HOLD nitroGLYcerin IF     hydrALAZINE (APRESOLINE) tablet 75 mg     isosorbide dinitrate (ISORDIL) tablet 20 mg     lidocaine (LMX4) cream     lidocaine (LMX4) cream     lidocaine 1 % 1 mL     magnesium sulfate 2 g in NS intermittent infusion ([facility] or [facility])     magnesium sulfate 4 g in 100 mL sterile water (premade)     milrinone (PRIMACOR) infusion 200 mcg/mL PREMIX     naloxone (NARCAN) injection 0.1-0.4 mg     omeprazole (priLOSEC) CR capsule 20 mg      polyethylene glycol (MIRALAX/GLYCOLAX) Packet 17 g     potassium chloride (KLOR-CON) Packet 20-40 mEq     potassium chloride 10 mEq in 100 mL intermittent infusion with 10 mg lidocaine     potassium chloride 10 mEq in 100 mL sterile water intermittent infusion (premix)     potassium chloride 20 mEq in 50 mL intermittent infusion     potassium chloride SA (K-DUR/KLOR-CON M) CR tablet 20-40 mEq     potassium chloride SA (K-DUR/KLOR-CON M) CR tablet 40 mEq     [START ON 11/22/2018] predniSONE (DELTASONE) tablet 10 mg     Reason beta blocker order not selected     senna-docusate (SENOKOT-S;PERICOLACE) 8.6-50 MG per tablet 1 tablet     sodium chloride (OCEAN) 0.65 % nasal spray 1 spray     sodium chloride (PF) 0.9% PF flush 10-20 mL     sodium chloride (PF) 0.9% PF flush 5-50 mL     thiamine tablet 100 mg     torsemide (DEMADEX) tablet 80 mg     traZODone (DESYREL) half-tab 25 mg       REVIEW OF LABORATORY, PATHOLOGY AND IMAGING RESULTS:  BMP  Recent Labs  Lab 11/21/18  0517 11/20/18  1730 11/20/18  0517 11/19/18  1550    135 134 134   POTASSIUM 3.7 3.6 3.5 4.3   CHLORIDE 99 97 96 99   ASHLEE 9.6 9.3 9.6 9.8   CO2 29 29 30 29   BUN 30 32* 29 30   CR 1.21 1.31* 1.14 1.20   * 131* 96 151*     CBC  Recent Labs  Lab 11/20/18  0517 11/15/18  0431   WBC 9.7 7.7   RBC 5.02 5.13   HGB 15.6 16.2   HCT 48.0 49.1   MCV 96 96   MCH 31.1 31.6   MCHC 32.5 33.0   RDW 16.6* 16.2*    144*     INR  Recent Labs  Lab 11/15/18  0431   INR 1.52*        Ref. Range 11/13/2018 21:19   Albumin Latest Ref Range: 3.4 - 5.0 g/dL 4.1   Protein Total Latest Ref Range: 6.8 - 8.8 g/dL 8.4   Bilirubin Total Latest Ref Range: 0.2 - 1.3 mg/dL 2.0 (H)   Alkaline Phosphatase Latest Ref Range: 40 - 150 U/L 92   ALT Latest Ref Range: 0 - 70 U/L 17   AST Latest Ref Range: 0 - 45 U/L 17       Pathology 11/19/18  FINAL DIAGNOSIS:   Liver needle biopsy, transjugular:   - Liver with findings suggestive of advanced chronic liver disease (stage   3-4  fibrosis)    IMPRESSION:  Danielito Chu is a 63 year old male with a history of HFrEF (EF 10/20%), s/p ICD, SHIRLEY, HTN, DM II who was admitted with fatigue and CHF exacerbation with evaluation for LVAD placement with concerns for advanced liver disease.     RECOMMENDATIONS:  1. Cardiac cirrhosis  - HVPG significantly elevated without overt evidence of portal hypertension- ascites/bleeding. Liver biopsy does have evidence of regenerative nodules along with fibrous bands. Minimal steatosis present.   - on imaging, there was no evidence of ascites and he has not had episodes of HE. No evidence of HCC on US.   - MELD 15 based on labs from 11/13.   - will plan for EGD today to screen for esophageal varices    Patient was discussed with attending physician, Dr. Horne    Next follow up appointment: will plan for follow-up with Dr. Horne on 12/10 at 1630    ADDENDUM:  EGD did not have evidence of portal hypertension or varices. In the setting of no evidence of portal hypertension on imaging or EGD. This represents stage 3 fibrosis rather than cirrhosis. He does have regenerative nodules on liver biopsy. The portal measurements do not correlate with physical findings and are inaccurate. He should be able to proceed with surgery if cardiology feels is appropriate. We will follow him as an outpatient.   Reviewed with Dr. Horne.     Thank you for the opportunity to be involved with  Danielito Chu care. Please call with any questions or concerns.    JOSE A Pena, CNP  Inpatient Hepatology [facility]  [phone]

## 2018-11-21 NOTE — PROGRESS NOTES
This is a recent snapshot of the patient's [facility] medical record.  For current drug dose and complete information and questions, call [phone] or In Basket pool, [facility] home infusion ([id number])  CSN Number:  [id number]

## 2018-11-21 NOTE — PLAN OF CARE
Problem: Patient Care Overview  Goal: Plan of Care/Patient Progress Review  Outcome: No Change    D: Patient admitted with cardiogenic shock. Hx NICM, HFrEF, ICD, DM 2.    I/A: A&Ox4. VSSA, on room air. SR/ST 90s-110s, frequent PVCs. Adequate UO. Independent. Denies pain. Right Double lumen PICC, milrinone gtt @ 0.125 mcg/kg/min (3.1 mL/hr).     P: Continue to monitor patient. [facility] to deliver CADD pump tomorrow with plans for discharge to home. PLC review of infusion/line cares arranged for 3pm tomorrow. Notify Cards 2 with pertinent changes.

## 2018-11-22 ENCOUNTER — PATIENT OUTREACH (OUTPATIENT)
Dept: CARE COORDINATION | Facility: CLINIC | Age: 63
End: 2018-11-22

## 2018-11-23 ENCOUNTER — CARE COORDINATION (OUTPATIENT)
Dept: CARDIOLOGY | Facility: CLINIC | Age: 63
End: 2018-11-23

## 2018-11-23 DIAGNOSIS — Z53.9 ERRONEOUS ENCOUNTER--DISREGARD: Primary | ICD-10-CM

## 2018-11-23 NOTE — PROGRESS NOTES
Patient has clinic visit within 24-48 hours of Discharge so no post DC follow up call is needed    11/26/2018 Status: [facility]    Time: 11:00 AM Length: 30     Visit Type: P CORE RETURN [[id number]]   CRYS: [id number]   Provider: Minoo Lopez NP Department:  [facility]ASCULAR [facility]

## 2018-11-23 NOTE — PROGRESS NOTES
This is a recent snapshot of the patient's [facility] medical record.  For current drug dose and complete information and questions, call [phone] or In [facility] pool, [facility] home infusion ([id number])  CSN Number:  [id number]

## 2018-11-23 NOTE — PROGRESS NOTES
Tell me how you have been doing since you were discharged from the hospital.  Doing okay. Reports he feels 'about the same' as he did when he went into the hospital. Reports that today is better than yesterday. Asked him if his feeling of being 'off balance' has improved, he reports that it is getting better over time.     ACTIVITY  How is your activity tolerance?    Okay - has been resting today as has had some travel days.     ASSISTANCE  Do you have someone at home to assist you with your daily activities?  Fabiano is currently staying with his brother in [place] so he doesn't have to be alone. Reports his brother is very helpful and 'an zaki' and is helping with appointments and medications.     MEDICATIONS  I would like to review your discharge medications and answer any questions you may have about them and also make sure you have all the medications that are new to you, and discuss any changes that were made to your pre-hospital medications.     Is someone helping you to set up your medications?    His brother is helping him.  He questions about his antidepressant and if he needs to take it. He asks if the team thinks he is depressed or if it is standard for everyone. I encouraged him to continue taking it for now, and educated on the prevalence of depression with heart failure. He reports he will continue to take it and discuss it at next appointment.   Asked how it is going with the milrinone drip. He reports it is going well. They just changed the bag and did the flush and report feeling more comfortable with it. Report just returned from a clinic appointment for PICC dressing change.      FOLLOW UP  Do you have a follow up appointment with your provider?   What other discharge instructions do you have?   Are you to get labs, procedures or tests before you see your provider?      You are scheduled to see Minoo on Monday November 26th for CORE follow up at 11am.     You are also scheduled for the following  device check and labs on November 26th at 1015am         CONTACT INFORMATION  Please feel free to call us with any other questions or symptoms that are concerning for you at [phone] option 1, option 3, if it is after 4:30 in the afternoon, or a weekend please call [phone] and ask for the on call specialist.  We want to do everything we can to help prevent you needing to return to the ED, so please do not hesitate to call us.       HEART FAILURE PATIENTS  Please weigh yourself daily and record your weights.  If you gain 2 pounds in 24 hours or 5 pounds in one week please call Giselle at [phone] option 1, option3.  HE reports he is weighing himself daily. Weight today was 178lb. Weight at discharge was 176lb.     DIET  It is recommended you follow a 2000 mg low sodium diet, avoid processed food, canned food and fast food restaurants.

## 2018-11-23 NOTE — PLAN OF CARE
Problem: Patient Care Overview  Goal: Plan of Care/Patient Progress Review  Physical Therapy Discharge Summary    Reason for therapy discharge:    Discharged to home.    Progress towards therapy goal(s). See goals on Care Plan in [facility] electronic health record for goal details.  Goals partially met.  Barriers to achieving goals:   discharge from facility.    Therapy recommendation(s):    Continued therapy is recommended.  Rationale/Recommendations:  home with assist, HEP .

## 2018-11-25 ENCOUNTER — HOME INFUSION (PRE-WILLOW HOME INFUSION) (OUTPATIENT)
Dept: PHARMACY | Facility: CLINIC | Age: 63
End: 2018-11-25

## 2018-11-26 ENCOUNTER — ALLIED HEALTH/NURSE VISIT (OUTPATIENT)
Dept: CARDIOLOGY | Facility: CLINIC | Age: 63
End: 2018-11-26
Payer: COMMERCIAL

## 2018-11-26 ENCOUNTER — TELEPHONE (OUTPATIENT)
Dept: GASTROENTEROLOGY | Facility: CLINIC | Age: 63
End: 2018-11-26

## 2018-11-26 ENCOUNTER — OFFICE VISIT (OUTPATIENT)
Dept: CARDIOLOGY | Facility: CLINIC | Age: 63
End: 2018-11-26
Attending: NURSE PRACTITIONER
Payer: COMMERCIAL

## 2018-11-26 ENCOUNTER — HOME INFUSION (PRE-WILLOW HOME INFUSION) (OUTPATIENT)
Dept: PHARMACY | Facility: CLINIC | Age: 63
End: 2018-11-26

## 2018-11-26 ENCOUNTER — HOSPITAL ENCOUNTER (INPATIENT)
Facility: CLINIC | Age: 63
Setting detail: SURGERY ADMIT
End: 2018-11-26
Attending: THORACIC SURGERY (CARDIOTHORACIC VASCULAR SURGERY) | Admitting: THORACIC SURGERY (CARDIOTHORACIC VASCULAR SURGERY)
Payer: COMMERCIAL

## 2018-11-26 VITALS
SYSTOLIC BLOOD PRESSURE: 104 MMHG | DIASTOLIC BLOOD PRESSURE: 71 MMHG | WEIGHT: 184 LBS | HEART RATE: 107 BPM | BODY MASS INDEX: 27.25 KG/M2 | RESPIRATION RATE: 16 BRPM | HEIGHT: 69 IN | OXYGEN SATURATION: 97 %

## 2018-11-26 DIAGNOSIS — I50.23 ACUTE ON CHRONIC SYSTOLIC CONGESTIVE HEART FAILURE (H): ICD-10-CM

## 2018-11-26 DIAGNOSIS — I50.22 CHRONIC SYSTOLIC HEART FAILURE (H): ICD-10-CM

## 2018-11-26 DIAGNOSIS — I42.8 NONISCHEMIC CARDIOMYOPATHY (H): Primary | ICD-10-CM

## 2018-11-26 LAB
ALBUMIN SERPL-MCNC: 3.8 G/DL (ref 3.4–5)
ALP SERPL-CCNC: 86 U/L (ref 40–150)
ALT SERPL W P-5'-P-CCNC: 19 U/L (ref 0–70)
ANION GAP SERPL CALCULATED.3IONS-SCNC: 9 MMOL/L (ref 3–14)
AST SERPL W P-5'-P-CCNC: 17 U/L (ref 0–45)
BILIRUB SERPL-MCNC: 2 MG/DL (ref 0.2–1.3)
BUN SERPL-MCNC: 32 MG/DL (ref 7–30)
CALCIUM SERPL-MCNC: 8.8 MG/DL (ref 8.5–10.1)
CHLORIDE SERPL-SCNC: 97 MMOL/L (ref 94–109)
CO2 SERPL-SCNC: 27 MMOL/L (ref 20–32)
CREAT SERPL-MCNC: 1.36 MG/DL (ref 0.66–1.25)
GFR SERPL CREATININE-BSD FRML MDRD: 53 ML/MIN/1.7M2
GLUCOSE SERPL-MCNC: 111 MG/DL (ref 70–99)
POTASSIUM SERPL-SCNC: 4.3 MMOL/L (ref 3.4–5.3)
PROT SERPL-MCNC: 8 G/DL (ref 6.8–8.8)
SODIUM SERPL-SCNC: 133 MMOL/L (ref 133–144)

## 2018-11-26 PROCEDURE — 99214 OFFICE O/P EST MOD 30 MIN: CPT | Mod: ZP | Performed by: NURSE PRACTITIONER

## 2018-11-26 PROCEDURE — 36415 COLL VENOUS BLD VENIPUNCTURE: CPT | Performed by: NURSE PRACTITIONER

## 2018-11-26 PROCEDURE — 80053 COMPREHEN METABOLIC PANEL: CPT | Performed by: NURSE PRACTITIONER

## 2018-11-26 PROCEDURE — G0463 HOSPITAL OUTPT CLINIC VISIT: HCPCS | Mod: ZF

## 2018-11-26 RX ORDER — TORSEMIDE 20 MG/1
40-80 TABLET ORAL 2 TIMES DAILY
Qty: 180 TABLET | Refills: 0 | Status: ON HOLD | OUTPATIENT
Start: 2018-11-26 | End: 2018-12-18

## 2018-11-26 RX ORDER — POTASSIUM CHLORIDE 1500 MG/1
40 TABLET, EXTENDED RELEASE ORAL 2 TIMES DAILY
Qty: 180 TABLET | Refills: 11 | Status: ON HOLD | OUTPATIENT
Start: 2018-11-26 | End: 2018-12-18

## 2018-11-26 ASSESSMENT — PAIN SCALES - GENERAL: PAINLEVEL: NO PAIN (0)

## 2018-11-26 NOTE — MR AVS SNAPSHOT
After Visit Summary   11/26/2018    Danielito Chu    MRN: [id number]           Patient Information     Date Of Birth          1955        Visit Information        Provider Department      11/26/2018 10:30 AM 1, Uc Cv Device [facility]        Today's Diagnoses     Nonischemic cardiomyopathy (H)    -  1      Care Instructions    It was a pleasure to see you in clinic today.  Please do not hesitate to call with any questions or concerns.  We look forward to seeing you in clinic at your next device check Jan 3rd 2019 at the time you see Dr. Watkins.    Kristie Garibay, RN  Electrophysiology Nurse Clinician  [facility]    During Business Hours Please Call:  [phone]  After Hours Please Call:  [phone] - select option #4 and ask for job code [phone]          Follow-ups after your visit        Your next 10 appointments already scheduled     Dec 05, 2018   Procedure with Andrei Silva MD   [facility], [place], Same Day Surgery (--)    [address] [facility] [address]   [phone]            Dec 10, 2018  2:00 PM CST   Lab with [facility]   [facility] ([facility])    [address]  1st Floor  [facility] [address]   [phone]            Dec 10, 2018  2:30 PM CST   (Arrive by 2:15 PM)   CORE RETURN with Minoo Lopez NP   [facility] ([facility])    [address]  [address]  [facility] [address]   [phone]            Dec 10, 2018  4:30 PM CST   (Arrive by 4:15 PM)   Hospital Follow Up with Candido Pedroza MD   [facility] Hepatology ([facility])    [address]  [address]  [facility] [address]   [phone]            Jan 03, 2019  9:00 AM CST   Lab with [facility]   [facility] ([facility])    [address]  1st Floor  [facility] [address]   [phone]            Jan 03, 2019  9:30 AM  CST   (Arrive by 9:15 AM)   RETURN HEART FAILURE with Lorena Watkins MD   [facility] ([facility])    [address]  [address] [place] [address] [phone]            Jan 03, 2019  9:30 AM CST   (Arrive by 9:15 AM)   Implanted Defibulator with Uc Cv Device 1   [facility] ([facility])    [address]  3rd Floor  [address]                 Future tests that were ordered for you today     Open Future Orders        Priority Expected Expires Ordered    Basic metabolic panel Routine 12/3/2018 12/7/2018 11/26/2018    Follow-Up with CORE Clinic Routine 12/10/2018 12/28/2018 11/26/2018            Who to contact     Please call your clinic at No information on file. to:    Ask questions about your health    Make or cancel appointments    Discuss your medicines    Learn about your test results    Speak to your doctor            Additional Information About Your Visit        Care EveryWhere ID     This is your Care EveryWhere ID. This could be used by other organizations to access your [place] medical records  [id number]         Blood Pressure from Last 3 Encounters:   11/26/18 104/71   11/21/18 116/80   11/13/18 102/78    Weight from Last 3 Encounters:   11/26/18 83.5 kg (184 lb)   11/21/18 80.2 kg (176 lb 11.2 oz)   11/13/18 84.8 kg (187 lb)              We Performed the Following     (34731) ICD DEVICE PROGRAMMING EVAL, SINGLE LEAD ICD          Today's Medication Changes          These changes are accurate as of 11/26/18  6:45 PM.  If you have any questions, ask your nurse or doctor.               These medicines have changed or have updated prescriptions.        Dose/Directions    potassium chloride ER 20 MEQ ER tablet   Commonly known as:  K-TAB   This may have changed:  how much to take   Used for:  Acute on chronic systolic congestive heart failure (H)   Changed by:  Minoo Lopez, ALBINA        Dose:  40 mEq    Take 2 tablets (40 mEq) by mouth 2 times daily   Quantity:  180 tablet   Refills:  11       torsemide 20 MG tablet   Commonly known as:  DEMADEX   This may have changed:    - how much to take  - additional instructions   Used for:  Acute on chronic systolic congestive heart failure (H)   Changed by:  Minoo Lopez NP        Dose:  40-80 mg   Take 2-4 tablets (40-80 mg) by mouth 2 times daily Take 80 mg in the morning and 40 mg in the afternoon   Quantity:  180 tablet   Refills:  0            Where to get your medicines      These medications were sent to [facility]/pharmacy #[id number] - [facility] [address] [address]     Phone:  [phone]     potassium chloride ER 20 MEQ ER tablet    torsemide 20 MG tablet                Primary Care Provider Office Phone # Fax #    Bashir Hines [phone]       [address] [address]        Equal Access to Services     ALEX RODGERS AH: Hadii andres gaston hadasho Soomaali, waaxda luqadaha, qaybta kaalmada adeegyada, aaron guerreroin shruthi nelson . So [facility] [phone].    ATENCIÓN: Si habla español, tiene a stokes disposición servicios gratuitos de asistencia lingüística. Marcellaame al [phone].    We comply with applicable federal civil rights laws and [place] laws. We do not discriminate on the basis of race, color, national origin, age, disability, sex, sexual orientation, or gender identity.            Thank you!     Thank you for choosing [facility]  for your care. Our goal is always to provide you with excellent care. Hearing back from our patients is one way we can continue to improve our services. Please take a few minutes to complete the written survey that you may receive in the mail after your visit with us. Thank you!             Your Updated Medication List - Protect others around you: Learn how to safely use, store and throw away your medicines at  www.disposemymeds.org.          This list is accurate as of 11/26/18  6:45 PM.  Always use your most recent med list.                   Brand Name Dispense Instructions for use Diagnosis    artificial saliva Liqd liquid     59 mL    Swish and spit 10 mLs in mouth 4 times daily as needed for dry mouth    Acute on chronic systolic congestive heart failure (H)       aspirin 81 MG EC tablet    ASA    30 tablet    Take 1 tablet (81 mg) by mouth daily    Acute systolic congestive heart failure (H)       atorvastatin 10 MG tablet    LIPITOR    90 tablet    Take 1 tablet (10 mg) by mouth daily    Acute systolic congestive heart failure (H)       Blood Glucose Monitor System w/Device Kit      three times a week        buPROPion 150 MG 24 hr tablet    WELLBUTRIN XL    60 tablet    Take 1 tablet (150 mg) by mouth daily    Depression, unspecified depression type       hydrALAZINE 25 MG tablet    APRESOLINE    120 tablet    Take 3 tablets (75 mg) by mouth 3 times daily    Acute on chronic systolic congestive heart failure (H)       isosorbide dinitrate 20 MG tablet    ISORDIL    90 tablet    Take 1 tablet (20 mg) by mouth 3 times daily    Acute on chronic systolic congestive heart failure (H)       metFORMIN 500 MG tablet    GLUCOPHAGE     Take 1 tablet (500 mg) by mouth 2 times daily (with meals)    Chronic systolic heart failure (H), Acute systolic congestive heart failure (H)       milrinone 20-5 MG/100ML-% infusion    PRIMACOR    1000 mL    Inject 10.3 mcg/min into the vein continuous    Acute on chronic systolic congestive heart failure (H)       omeprazole 20 MG DR capsule    priLOSEC    90 capsule    Take 1 capsule (20 mg) by mouth every morning (before breakfast)    Gastritis without bleeding, unspecified chronicity, unspecified gastritis type       potassium chloride ER 20 MEQ ER tablet    K-TAB    180 tablet    Take 2 tablets (40 mEq) by mouth 2 times daily    Acute on chronic systolic congestive heart failure (H)        torsemide 20 MG tablet    DEMADEX    180 tablet    Take 2-4 tablets (40-80 mg) by mouth 2 times daily Take 80 mg in the morning and 40 mg in the afternoon    Acute on chronic systolic congestive heart failure (H)       traZODone 100 MG tablet    DESYREL    30 tablet    TAKE 1 TABLET BY MOUTH AT BEDTIME AS NEEDED FOR SLEEP    Insomnia, unspecified type       vitamin B1 100 MG tablet    THIAMINE    60 tablet    Take 1 tablet (100 mg) by mouth daily    Acute on chronic systolic heart failure (H)

## 2018-11-26 NOTE — MR AVS SNAPSHOT
After Visit Summary   11/26/2018    Juan Chu    MRN: [id number]           Patient Information     Date Of Birth          1955        Visit Information        Provider Department      11/26/2018 11:00 AM Minoo Lopez NP  [facility]        Today's Diagnoses     Chronic systolic heart failure (H)        Acute on chronic systolic congestive heart failure (H)          Care Instructions    You were seen today in the Cardiovascular Clinic at the [facility].     Cardiology Providers you saw during your visit: Minoo Lopez NP     1. Please reduce torsemide to 80 mg in the morning and 40 mg in the afternoon    2. Please reduce potassium to 40 mEq (2 tablets) twice daily    3. We will need blood drawn in 1 week    4. Please return to clinic on 12/10, coordinated with your other appointment    5. I will message Dr. Watkins and your evaluation coordinator for an update      Results for JUAN CHU (MRN [id number]) as of 11/26/2018 10:51   Ref. Range 11/26/2018 09:32   Sodium Latest Ref Range: 133 - 144 mmol/L 133   Potassium Latest Ref Range: 3.4 - 5.3 mmol/L 4.3   Chloride Latest Ref Range: 94 - 109 mmol/L 97   Carbon Dioxide Latest Ref Range: 20 - 32 mmol/L 27   Urea Nitrogen Latest Ref Range: 7 - 30 mg/dL 32 (H)   Creatinine Latest Ref Range: 0.66 - 1.25 mg/dL 1.36 (H)   GFR Estimate Latest Ref Range: >60 mL/min/1.7m2 53 (L)   GFR Estimate If Black Latest Ref Range: >60 mL/min/1.7m2 64   Calcium Latest Ref Range: 8.5 - 10.1 mg/dL 8.8   Anion Gap Latest Ref Range: 3 - 14 mmol/L 9   Albumin Latest Ref Range: 3.4 - 5.0 g/dL 3.8   Protein Total Latest Ref Range: 6.8 - 8.8 g/dL 8.0   Bilirubin Total Latest Ref Range: 0.2 - 1.3 mg/dL 2.0 (H)   Alkaline Phosphatase Latest Ref Range: 40 - 150 U/L 86   ALT Latest Ref Range: 0 - 70 U/L 19   AST Latest Ref Range: 0 - 45 U/L 17   Glucose Latest Ref Range: 70 - 99 mg/dL 111 (H)       Please limit your fluid intake to  "2 L (64 ounces) daily.  2 Liters a day = 8.5 cups, or 64 ounces.  Please limit your salt intake to 2 grams a day or less.     If you gain 2# in 24 hours or 5# in one week call Sparkle Joel RN so we can adjust your medications as needed over the phone.     Please feel free to call me with any questions or concerns.       Sparkle Joel RN BSN CHFN  [facility]  Cardiology Care Coordinator-Heart Failure Clinic     Questions and [phone].   First press #1 for the University and then press #3 for \"Medical Questions\" to reach us Cardiology Nurses.      On Call Cardiologist for after hours or on weekends: [phone]   option #4 and ask to speak to the on-call Cardiologist. Inform them you are a CORE/heart failure patient at the [facility].           If you need a medication refill please contact your pharmacy.  Please allow 3 business days for your refill to be completed.  _______________________________________________________  C.O.R.E. CLINIC Cardiomyopathy, Optimization, Rehabilitation, Education   The C.O.R.E. CLINIC is a heart failure specialty clinic within the [facility] where you will work with specialized nurse practitioners dedicated to helping patients with heart failure carefully adjust medications, receive education, and learn who and when to call if symptoms develop. They specialize in helping you better understand your condition, slow the progression of your disease, improve the length and quality of your life, help you detect future heart problems before they become life threatening, and avoid hospitalizations.  As always, thank you for trusting us with your health care needs!           Follow-ups after your visit        Additional Services     Follow-Up with CORE Clinic                 Your next 10 appointments already scheduled     Dec 05, 2018   Procedure with Andrei Silva MD   [facility], [place], Same Day Surgery " (--)    [address] [address]   [phone]            Dec 10, 2018  2:00 PM CST   Lab with [facility]   [facility] ([facility])    [address]  1st Floor  [facility] [address] [phone]            Dec 10, 2018  2:30 PM CST   (Arrive by 2:15 PM)   CORE RETURN with Minoo Lopez NP   [facility] ([facility])    [address] [place] [address]  [address]  [facility] [address] [phone]            Dec 10, 2018  4:30 PM CST   (Arrive by 4:15 PM)   Hospital Follow Up with Candido Pedroza MD   [facility] Hepatology ([facility])    [address] [place] [address]  [address]  [facility] [id number] [phone]            Jan 03, 2019  9:00 AM CST   Lab with [facility]   [facility] ([facility])    [address] [place] [address]  [address] [facility] [id number] [phone]            Jan 03, 2019  9:30 AM CST   (Arrive by 9:15 AM)   RETURN HEART FAILURE with Lorena Watkins MD   [facility] ([facility])    [address] [place] [address]  [address] [place] [id number] [phone]            Jan 03, 2019  9:30 AM CST   (Arrive by 9:15 AM)   Implanted Defibulator with Uc Cv Device 1   [facility] ([facility])    [address] [place] [address]  3rd Floor  [address]                 Future tests that were ordered for you today     Open Future Orders        Priority Expected Expires Ordered    Follow-Up with CORE Clinic Routine 12/10/2018 12/28/2018 11/26/2018            Who to contact     If you have questions or need follow up information about today's clinic visit or your schedule please contact [facility] directly at [phone].  Normal or non-critical lab and imaging results will be communicated to you by MyChart, letter or phone within 4 business days after the clinic has received the results. If you do not hear  "from us within 7 days, please contact the clinic through My[facility] or phone. If you have a critical or abnormal lab result, we will notify you by phone as soon as possible.  Submit refill requests through [employer] or call your pharmacy and they will forward the refill request to us. Please allow 3 business days for your refill to be completed.          Additional Information About Your Visit        Care EveryWhere ID     This is your Care EveryWhere ID. This could be used by other organizations to access your [place] medical records  [id number]        Your Vitals Were     Pulse Respirations Height Pulse Oximetry BMI (Body Mass Index)       107 16 1.753 m (5' 9\") 97% 27.17 kg/m2        Blood Pressure from Last 3 Encounters:   11/26/18 104/71   11/21/18 116/80   11/13/18 102/78    Weight from Last 3 Encounters:   11/26/18 83.5 kg (184 lb)   11/21/18 80.2 kg (176 lb 11.2 oz)   11/13/18 84.8 kg (187 lb)              We Performed the Following     Follow-Up with CORE Clinic          Today's Medication Changes          These changes are accurate as of 11/26/18 11:58 AM.  If you have any questions, ask your nurse or doctor.               These medicines have changed or have updated prescriptions.        Dose/Directions    potassium chloride ER 20 MEQ ER tablet   Commonly known as:  K-TAB   This may have changed:  how much to take   Used for:  Acute on chronic systolic congestive heart failure (H)   Changed by:  Minoo Lopez, NP        Dose:  40 mEq   Take 2 tablets (40 mEq) by mouth 2 times daily   Quantity:  180 tablet   Refills:  11       torsemide 20 MG tablet   Commonly known as:  DEMADEX   This may have changed:    - how much to take  - additional instructions   Used for:  Acute on chronic systolic congestive heart failure (H)   Changed by:  Minoo Lopez, NP        Dose:  40-80 mg   Take 2-4 tablets (40-80 mg) by mouth 2 times daily Take 80 mg in the morning and 40 mg in the afternoon   Quantity:  180 " tablet   Refills:  0            Where to get your medicines      These medications were sent to [facility]/pharmacy #[id number] - [facility] [address] [address]     Phone:  [phone]     potassium chloride ER 20 MEQ ER tablet    torsemide 20 MG tablet                Primary Care Provider Office Phone # Fax #    Bashir Hines [phone]       [facility] [address] [facility] [address]        Equal Access to Services     ALEX RODGERS : Hadii aad ku hadasho Soomaali, waaxda luqadaha, qaybta kaalmada adeegyada, waxay idiin hayaan adeeg kharash latahir pearson. So [facility] [phone].    ATENCIÓN: Si habla español, tiene a stokse disposición servicios gratuitos de asistencia lingüística. [facility] [phone].    We comply with applicable federal civil rights laws and [place] laws. We do not discriminate on the basis of race, color, national origin, age, disability, sex, sexual orientation, or gender identity.            Thank you!     Thank you for choosing [facility]  for your care. Our goal is always to provide you with excellent care. Hearing back from our patients is one way we can continue to improve our services. Please take a few minutes to complete the written survey that you may receive in the mail after your visit with us. Thank you!             Your Updated Medication List - Protect others around you: Learn how to safely use, store and throw away your medicines at www.disposemymeds.org.          This list is accurate as of 11/26/18 11:58 AM.  Always use your most recent med list.                   Brand Name Dispense Instructions for use Diagnosis    artificial saliva Liqd liquid     59 mL    Swish and spit 10 mLs in mouth 4 times daily as needed for dry mouth    Acute on chronic systolic congestive heart failure (H)       aspirin 81 MG EC tablet    ASA    30 tablet    Take 1 tablet (81 mg) by mouth daily    Acute systolic congestive  heart failure (H)       atorvastatin 10 MG tablet    LIPITOR    90 tablet    Take 1 tablet (10 mg) by mouth daily    Acute systolic congestive heart failure (H)       Blood Glucose Monitor System w/Device Kit      three times a week        buPROPion 150 MG 24 hr tablet    WELLBUTRIN XL    60 tablet    Take 1 tablet (150 mg) by mouth daily    Depression, unspecified depression type       hydrALAZINE 25 MG tablet    APRESOLINE    120 tablet    Take 3 tablets (75 mg) by mouth 3 times daily    Acute on chronic systolic congestive heart failure (H)       isosorbide dinitrate 20 MG tablet    ISORDIL    90 tablet    Take 1 tablet (20 mg) by mouth 3 times daily    Acute on chronic systolic congestive heart failure (H)       metFORMIN 500 MG tablet    GLUCOPHAGE     Take 1 tablet (500 mg) by mouth 2 times daily (with meals)    Chronic systolic heart failure (H), Acute systolic congestive heart failure (H)       milrinone 20-5 MG/100ML-% infusion    PRIMACOR    1000 mL    Inject 10.3 mcg/min into the vein continuous    Acute on chronic systolic congestive heart failure (H)       omeprazole 20 MG DR capsule    priLOSEC    90 capsule    Take 1 capsule (20 mg) by mouth every morning (before breakfast)    Gastritis without bleeding, unspecified chronicity, unspecified gastritis type       potassium chloride ER 20 MEQ ER tablet    K-TAB    180 tablet    Take 2 tablets (40 mEq) by mouth 2 times daily    Acute on chronic systolic congestive heart failure (H)       torsemide 20 MG tablet    DEMADEX    180 tablet    Take 2-4 tablets (40-80 mg) by mouth 2 times daily Take 80 mg in the morning and 40 mg in the afternoon    Acute on chronic systolic congestive heart failure (H)       traZODone 100 MG tablet    DESYREL    30 tablet    TAKE 1 TABLET BY MOUTH AT BEDTIME AS NEEDED FOR SLEEP    Insomnia, unspecified type       vitamin B1 100 MG tablet    THIAMINE    60 tablet    Take 1 tablet (100 mg) by mouth daily    Acute on chronic  systolic heart failure (H)

## 2018-11-26 NOTE — PATIENT INSTRUCTIONS
You were seen today in the Cardiovascular Clinic at the [facility].     Cardiology Providers you saw during your visit: Minoo Lopez NP     1. Please reduce torsemide to 80 mg in the morning and 40 mg in the afternoon    2. Please reduce potassium to 40 mEq (2 tablets) twice daily    3. We will need blood drawn in 1 week    4. Please return to clinic on 12/10, coordinated with your other appointment    5. I will message Dr. Watkins and your evaluation coordinator for an update      Results for JUAN SHEN (MRN [id number]) as of 11/26/2018 10:51   Ref. Range 11/26/2018 09:32   Sodium Latest Ref Range: 133 - 144 mmol/L 133   Potassium Latest Ref Range: 3.4 - 5.3 mmol/L 4.3   Chloride Latest Ref Range: 94 - 109 mmol/L 97   Carbon Dioxide Latest Ref Range: 20 - 32 mmol/L 27   Urea Nitrogen Latest Ref Range: 7 - 30 mg/dL 32 (H)   Creatinine Latest Ref Range: 0.66 - 1.25 mg/dL 1.36 (H)   GFR Estimate Latest Ref Range: >60 mL/min/1.7m2 53 (L)   GFR Estimate If Black Latest Ref Range: >60 mL/min/1.7m2 64   Calcium Latest Ref Range: 8.5 - 10.1 mg/dL 8.8   Anion Gap Latest Ref Range: 3 - 14 mmol/L 9   Albumin Latest Ref Range: 3.4 - 5.0 g/dL 3.8   Protein Total Latest Ref Range: 6.8 - 8.8 g/dL 8.0   Bilirubin Total Latest Ref Range: 0.2 - 1.3 mg/dL 2.0 (H)   Alkaline Phosphatase Latest Ref Range: 40 - 150 U/L 86   ALT Latest Ref Range: 0 - 70 U/L 19   AST Latest Ref Range: 0 - 45 U/L 17   Glucose Latest Ref Range: 70 - 99 mg/dL 111 (H)       Please limit your fluid intake to 2 L (64 ounces) daily.  2 Liters a day = 8.5 cups, or 64 ounces.  Please limit your salt intake to 2 grams a day or less.     If you gain 2# in 24 hours or 5# in one week call Sparkle Joel RN so we can adjust your medications as needed over the phone.     Please feel free to call me with any questions or concerns.       Sparkle Joel RN BSN CHFN  [facility]  Cardiology Care Coordinator-Heart Failure  "Clinic     Questions and [phone].   First press #1 for the University and then press #3 for \"Medical Questions\" to reach us Cardiology Nurses.      On Call Cardiologist for after hours or on weekends: [phone]   option #4 and ask to speak to the on-call Cardiologist. Inform them you are a CORE/heart failure patient at the [facility].           If you need a medication refill please contact your pharmacy.  Please allow 3 business days for your refill to be completed.  _______________________________________________________  C.O.R.E. CLINIC Cardiomyopathy, Optimization, Rehabilitation, Education   The C.O.R.E. CLINIC is a heart failure specialty clinic within the [facility] where you will work with specialized nurse practitioners dedicated to helping patients with heart failure carefully adjust medications, receive education, and learn who and when to call if symptoms develop. They specialize in helping you better understand your condition, slow the progression of your disease, improve the length and quality of your life, help you detect future heart problems before they become life threatening, and avoid hospitalizations.  As always, thank you for trusting us with your health care needs!   "

## 2018-11-26 NOTE — PATIENT INSTRUCTIONS
It was a pleasure to see you in clinic today.  Please do not hesitate to call with any questions or concerns.  We look forward to seeing you in clinic at your next device check Jan 3rd 2019 at the time you see Dr. Watkins.    Kristie Garibay, RN  Electrophysiology Nurse Clinician  [facility]    During Business Hours Please Call:  [phone]  After Hours Please Call:  [phone] - select option #4 and ask for job code [id number]

## 2018-11-26 NOTE — MR AVS SNAPSHOT
MRN:[id number]                      After Visit Summary   11/26/2018    Danielito Chu    MRN: [id number]           Visit Information        Provider Department      11/26/2018 10:30 AM 1, Uc Cv Device [facility]        Your next 10 appointments already scheduled     Nov 26, 2018 11:00 AM CST   (Arrive by 10:45 AM)   CORE RETURN with Minoo Lopez NP   [facility] ([facility] [place])    [address]  [address]  [place] [address] [phone]            Dec 10, 2018  4:30 PM CST   (Arrive by 4:15 PM)   Hospital Follow Up with Candido Pedroza MD   [facility] ([facility])    [address]  [address]  [place] [id number] [phone]            Jan 03, 2019  9:00 AM CST   Lab with  LAB   [facility] ([facility])    [address] [place] [address]  1st Floor  [place] [id number] [phone]            Jan 03, 2019  9:30 AM CST   (Arrive by 9:15 AM)   RETURN HEART FAILURE with Lorena Watkins MD   [facility] ([facility])    [address]  [address]  [place] [id number] [phone]            Jan 03, 2019  9:30 AM CST   (Arrive by 9:15 AM)   Implanted Defibulator with Uc Cv Device 1   [facility] ([facility] [place])    [address]  3rd Floor  [address]                 Care Instructions    It was a pleasure to see you in clinic today.  Please do not hesitate to call with any questions or concerns.  We look forward to seeing you in clinic at your next device check Jan 3rd 2019 at the time you see Dr. Watkins.    Kristie Garibay, RN  Electrophysiology Nurse Clinician  [facility]    During Business Hours Please Call:  [phone]  After Hours Please Call:  [phone] - select option #4 and ask for job code [phone]         Care EveryWhere ID     This  is your Care EveryWhere ID. This could be used by other organizations to access your [facility] medical records  [id number]        Equal Access to Services     ALEX RODGERS : Eddie Tsai, daphne pena, sandip leary, aaron pearson. So [facility] [phone].    ATENCIÓN: Si habla español, tiene a stokes disposición servicios gratuitos de asistencia lingüística. Llame al [phone].    We comply with applicable federal civil rights laws and [place] laws. We do not discriminate on the basis of race, color, national origin, age, disability, sex, sexual orientation, or gender identity.

## 2018-11-26 NOTE — TELEPHONE ENCOUNTER
Called patient to let him know that his lab appointment scheduled on 12/10 at 4:00 pm is canceled.  Dr. Horne will order labs during visit so he should plan on getting labs after appointment.  Patient expressed understanding and had no further questions or concerns.

## 2018-11-26 NOTE — LETTER
2018      RE: Danielito Chu  [address] [place] [address]       Dear Colleague,    Thank you for the opportunity to participate in the care of your patient, Danielito Chu, at the [facility]. Please see a copy of my visit note below.    HPI:   Mr. Danielito Chu is a 63yr old male with a history of NICM (LVEF at dong of 15%) s/p ICD (2017), HTN, SHIRLEY, and HL who presents to CORE clinic for follow up. He was recently admitted with progressive fatigue and found to be in ambulatory shock. Hospital course was notable for RHC on admission showing elevated right and left sided pressures with an index of 1.2. Nipride was started then transitioned to milrinone. He was diuresed. VAD evaluation was completed and he was discharged to home on milrinone at 0.125 mcg/kg/min. He was also evaluated by GI team for hepatic fibrosis including a liver biopsy () and EGD () without evidence of EV or portal hypertension.     Mr. Chu noted improvement in energy and steadiness. MInimal SOB. No orthopnea or PND. No lightheadedness, chest pain, palpitations, edema, bloating. Appetite is fair. No nausea or vomiting since discharge.     PICC site is without redness, discharge, or tenderness. No fevers or chills.     No focal deficits.     PAST MEDICAL HISTORY:  Past Medical History:   Diagnosis Date     Acute systolic congestive heart failure (H) 2017     Diabetes (H)      HTN (hypertension)      Hyperlipidemia      Mitral regurgitation      Nocturnal oxygen desaturation 3/29/2018     Nonischemic cardiomyopathy (H) 2017     SHIRLEY (obstructive sleep apnea)      Pacemaker 2017    ICD 17       FAMILY HISTORY:  Family History   Problem Relation Age of Onset     Heart Failure Father       at age 86     Heart Failure Paternal Uncle       at 66      Myocardial Infarction Paternal Uncle       at age 62     Coronary Artery Disease  Mother       during CABG at age 41       SOCIAL HISTORY:  Social History     Social History     Marital status: Single     Spouse name: N/A     Number of children: N/A     Years of education: N/A     Social History Main Topics     Smoking status: Former Smoker     Smokeless tobacco: Never Used      Comment: quit 3/2017     Alcohol use None      Comment: social     Drug use: None     Sexual activity: Not Asked     Other Topics Concern     None     Social History Narrative       CURRENT MEDICATIONS:   Current Outpatient Prescriptions on File Prior to Visit:  artificial saliva (BIOTENE DRY MOUTHWASH) LIQD liquid Swish and spit 10 mLs in mouth 4 times daily as needed for dry mouth   aspirin EC 81 MG EC tablet Take 1 tablet (81 mg) by mouth daily   atorvastatin (LIPITOR) 10 MG tablet Take 1 tablet (10 mg) by mouth daily   Blood Glucose Monitoring Suppl (BLOOD GLUCOSE MONITOR SYSTEM) w/Device KIT three times a week   buPROPion (WELLBUTRIN XL) 150 MG 24 hr tablet Take 1 tablet (150 mg) by mouth daily   hydrALAZINE (APRESOLINE) 25 MG tablet Take 3 tablets (75 mg) by mouth 3 times daily   isosorbide dinitrate (ISORDIL) 20 MG tablet Take 1 tablet (20 mg) by mouth 3 times daily   metFORMIN (GLUCOPHAGE) 500 MG tablet Take 1 tablet (500 mg) by mouth 2 times daily (with meals)   milrinone (PRIMACOR) infusion 200 mcg/mL PREMIX Inject 10.3 mcg/min into the vein continuous   omeprazole (PRILOSEC) 20 MG CR capsule Take 1 capsule (20 mg) by mouth every morning (before breakfast)   potassium chloride ER (K-TAB) 20 MEQ ER tablet Take 3 tablets (60 mEq) by mouth 2 times daily   thiamine 100 MG tablet Take 1 tablet (100 mg) by mouth daily   torsemide (DEMADEX) 20 MG tablet Take 4 tablets (80 mg) by mouth 2 times daily   traZODone (DESYREL) 100 MG tablet TAKE 1 TABLET BY MOUTH AT BEDTIME AS NEEDED FOR SLEEP     No current facility-administered medications on file prior to visit.         ROS:   Constitutional: No fever, chills, or  "sweats. Stable weight.   ENT: No visual disturbance, ear ache, epistaxis, sore throat.   Allergies/Immunologic: Negative.   Respiratory: No cough, hemoptysis.   Cardiovascular: As per HPI.   GI: No nausea, vomiting, hematemesis, melena, or hematochezia.   : No urinary frequency, dysuria, or hematuria.   Integument: Negative.   Psychiatric: As per HPI.  Neuro: Negative.   Endocrinology: Negative.   Musculoskeletal: As per HPI.    EXAM:  /71  Pulse 107  Resp 16  Ht 1.753 m (5' 9\")  Wt 83.5 kg (184 lb)  SpO2 97%  BMI 27.17 kg/m2  General: appears comfortable, alert and articulate  Head: normocephalic, atraumatic  Eyes: anicteric sclera, EOMI  Neck: no adenopathy  Orophyarynx: moist mucosa, no lesions, dentition intact  Heart: regular, S1/S2, grade 2/6 JENNY best heard at LLSB, no gallop or rub, JVP is flat  Lungs: clear, no rales or wheezing  Abdomen: soft, non-tender, bowel sounds present, no hepatomegaly  Extremities: no clubbing, cyanosis or LE edema  Neurological: normal speech and affect, no gross motor deficits    Labs:    CMP RESULTS:  Lab Results   Component Value Date     11/26/2018    POTASSIUM 4.3 11/26/2018    CHLORIDE 97 11/26/2018    CO2 27 11/26/2018    ANIONGAP 9 11/26/2018     (H) 11/26/2018    BUN 32 (H) 11/26/2018    CR 1.36 (H) 11/26/2018    GFRESTIMATED 53 (L) 11/26/2018    GFRESTBLACK 64 11/26/2018    ASHLEE 8.8 11/26/2018    BILITOTAL 2.0 (H) 11/26/2018    ALBUMIN 3.8 11/26/2018    ALKPHOS 86 11/26/2018    ALT 19 11/26/2018    AST 17 11/26/2018        INR RESULTS:  Lab Results   Component Value Date    INR 1.52 (H) 11/15/2018       Lab Results   Component Value Date    MAG 2.3 11/21/2018     Lab Results   Component Value Date    NTBNPI 1970 (H) 10/27/2018     Lab Results   Component Value Date    NTBNP 1622 (H) 10/17/2018     Diagnostics:  Reviewed in EPIC.      Assessment and Plan:   Mr. Danielito Chu is a 63yr old male with advanced heart failure now on home inotropes. He " will reduce torsemide to 80 mg in the AM and 40 mg in the PM. ALso reduce potassium to 40 mEq BID. He will return to clinic in 2 weeks and call with an update in 1 week. In the meantime, I've messaged Dr. Watkins regarding next steps for LVAD implant.     1. Chronic systolic heart failure secondary to NICM  Stage D  NYHA Class III  ACEi/ARB:  Intolerant; is on hydralazine  BB:  deferred given inotropes  Aldosterone antagonist:  Deferred due to hyperkalemia  SCD prophylaxis:  ICD  Fluid status:  euvolemic  Sleep Apnea Evaluation:  Reports central sleep apnea, but does not tolerate CPAP.  Advised that he consider repeating sleep study and evaluating possible options for treatment.    2. H/O NSVT. Episode on October with >200 episodes of NSVT. Device check today only showed 2 NSVT episodes. He is not on antiarrhythmic or a beta blocker. Continue to monitor    35 minutes spent in direct care, >50% in counseling    Minoo Lopez NP      CC  Patient Care Team:  Bashir Hines as PCP - General (Family Practice)  Lorena Watkins MD as MD (Cardiology)  Sparkle Joel, RN as Nurse Coordinator (Cardiology)  Cecelia Palacios, RN as Nurse Coordinator (Cardiology)  Deysi Mattson APRN CNP as Nurse Practitioner (Cardiology)  Kalli Purcell APRN CNP as Nurse Practitioner (Cardiology)  Jermain Hobbs, ARAVIND as Nurse Coordinator (Cardiology)  Giselle Johnson, ARAVIND as Nurse Coordinator (Cardiology)  Candido Mendez MD as MD (Gastroenterology)  SELF, REFERRED

## 2018-11-26 NOTE — PROGRESS NOTES
Preliminary Device Interrogation Results.  Final physician signed paceart report to be scanned and attached.    Pt seen in Clinic for evaluation and iterative programming of a [employer] Single lead ICD, per MD orders. Intrinsic rhythm is  bpm. Normal ICD function. 2 NSVT Arrhythmias recorded - 1st episode appears to be SVT lasting 8 seconds @ 174 bpm, 2nd episode appears to be VT lasting 5 seconds @ 192 bpm. = <1%. Lead trends appear stable. Pt reports that he is feeling well. Battery estimates 12 years to [place]. Plan for pt to RTC on 1/3/19.   Single lead ICD

## 2018-11-26 NOTE — PROGRESS NOTES
HPI:   Mr. Danielito Chu is a 63yr old male with a history of NICM (LVEF at dong of 15%) s/p ICD (2017), HTN, SHIRLEY, and HL who presents to CORE clinic for follow up. He was recently admitted with progressive fatigue and found to be in ambulatory shock. Hospital course was notable for RHC on admission showing elevated right and left sided pressures with an index of 1.2. Nipride was started then transitioned to milrinone. He was diuresed. VAD evaluation was completed and he was discharged to home on milrinone at 0.125 mcg/kg/min. He was also evaluated by GI team for hepatic fibrosis including a liver biopsy () and EGD () without evidence of EV or portal hypertension.     Mr. Chu noted improvement in energy and steadiness. MInimal SOB. No orthopnea or PND. No lightheadedness, chest pain, palpitations, edema, bloating. Appetite is fair. No nausea or vomiting since discharge.     PICC site is without redness, discharge, or tenderness. No fevers or chills.     No focal deficits.     PAST MEDICAL HISTORY:  Past Medical History:   Diagnosis Date     Acute systolic congestive heart failure (H) 2017     Diabetes (H)      HTN (hypertension)      Hyperlipidemia      Mitral regurgitation      Nocturnal oxygen desaturation 3/29/2018     Nonischemic cardiomyopathy (H) 2017     SHIRLEY (obstructive sleep apnea)      Pacemaker 2017    ICD 17       FAMILY HISTORY:  Family History   Problem Relation Age of Onset     Heart Failure Father       at age 86     Heart Failure Paternal Uncle       at 66      Myocardial Infarction Paternal Uncle       at age 62     Coronary Artery Disease Mother       during CABG at age 41       SOCIAL HISTORY:  Social History     Social History     Marital status: Single     Spouse name: N/A     Number of children: N/A     Years of education: N/A     Social History Main Topics     Smoking status: Former Smoker     Smokeless tobacco: Never Used      Comment: quit  3/2017     Alcohol use None      Comment: social     Drug use: None     Sexual activity: Not Asked     Other Topics Concern     None     Social History Narrative       CURRENT MEDICATIONS:   Current Outpatient Prescriptions on File Prior to Visit:  artificial saliva (BIOTENE DRY MOUTHWASH) LIQD liquid Swish and spit 10 mLs in mouth 4 times daily as needed for dry mouth   aspirin EC 81 MG EC tablet Take 1 tablet (81 mg) by mouth daily   atorvastatin (LIPITOR) 10 MG tablet Take 1 tablet (10 mg) by mouth daily   Blood Glucose Monitoring Suppl (BLOOD GLUCOSE MONITOR SYSTEM) w/Device KIT three times a week   buPROPion (WELLBUTRIN XL) 150 MG 24 hr tablet Take 1 tablet (150 mg) by mouth daily   hydrALAZINE (APRESOLINE) 25 MG tablet Take 3 tablets (75 mg) by mouth 3 times daily   isosorbide dinitrate (ISORDIL) 20 MG tablet Take 1 tablet (20 mg) by mouth 3 times daily   metFORMIN (GLUCOPHAGE) 500 MG tablet Take 1 tablet (500 mg) by mouth 2 times daily (with meals)   milrinone (PRIMACOR) infusion 200 mcg/mL PREMIX Inject 10.3 mcg/min into the vein continuous   omeprazole (PRILOSEC) 20 MG CR capsule Take 1 capsule (20 mg) by mouth every morning (before breakfast)   potassium chloride ER (K-TAB) 20 MEQ ER tablet Take 3 tablets (60 mEq) by mouth 2 times daily   thiamine 100 MG tablet Take 1 tablet (100 mg) by mouth daily   torsemide (DEMADEX) 20 MG tablet Take 4 tablets (80 mg) by mouth 2 times daily   traZODone (DESYREL) 100 MG tablet TAKE 1 TABLET BY MOUTH AT BEDTIME AS NEEDED FOR SLEEP     No current facility-administered medications on file prior to visit.         ROS:   Constitutional: No fever, chills, or sweats. Stable weight.   ENT: No visual disturbance, ear ache, epistaxis, sore throat.   Allergies/Immunologic: Negative.   Respiratory: No cough, hemoptysis.   Cardiovascular: As per HPI.   GI: No nausea, vomiting, hematemesis, melena, or hematochezia.   : No urinary frequency, dysuria, or hematuria.   Integument:  "Negative.   Psychiatric: As per HPI.  Neuro: Negative.   Endocrinology: Negative.   Musculoskeletal: As per HPI.    EXAM:  /71  Pulse 107  Resp 16  Ht 1.753 m (5' 9\")  Wt 83.5 kg (184 lb)  SpO2 97%  BMI 27.17 kg/m2  General: appears comfortable, alert and articulate  Head: normocephalic, atraumatic  Eyes: anicteric sclera, EOMI  Neck: no adenopathy  Orophyarynx: moist mucosa, no lesions, dentition intact  Heart: regular, S1/S2, grade 2/6 JENNY best heard at LLSB, no gallop or rub, JVP is flat  Lungs: clear, no rales or wheezing  Abdomen: soft, non-tender, bowel sounds present, no hepatomegaly  Extremities: no clubbing, cyanosis or LE edema  Neurological: normal speech and affect, no gross motor deficits    Labs:    CMP RESULTS:  Lab Results   Component Value Date     11/26/2018    POTASSIUM 4.3 11/26/2018    CHLORIDE 97 11/26/2018    CO2 27 11/26/2018    ANIONGAP 9 11/26/2018     (H) 11/26/2018    BUN 32 (H) 11/26/2018    CR 1.36 (H) 11/26/2018    GFRESTIMATED 53 (L) 11/26/2018    GFRESTBLACK 64 11/26/2018    ASHLEE 8.8 11/26/2018    BILITOTAL 2.0 (H) 11/26/2018    ALBUMIN 3.8 11/26/2018    ALKPHOS 86 11/26/2018    ALT 19 11/26/2018    AST 17 11/26/2018        INR RESULTS:  Lab Results   Component Value Date    INR 1.52 (H) 11/15/2018       Lab Results   Component Value Date    MAG 2.3 11/21/2018     Lab Results   Component Value Date    NTBNPI 1970 (H) 10/27/2018     Lab Results   Component Value Date    NTBNP 1622 (H) 10/17/2018     Diagnostics:  Reviewed in EPIC.      Assessment and Plan:   Mr. Danielito Chu is a 63yr old male with advanced heart failure now on home inotropes. He will reduce torsemide to 80 mg in the AM and 40 mg in the PM. ALso reduce potassium to 40 mEq BID. He will return to clinic in 2 weeks and call with an update in 1 week. In the meantime, I've messaged Dr. Watkins regarding next steps for LVAD implant.     1. Chronic systolic heart failure secondary to NICM  Stage " D  NYHA Class III  ACEi/ARB:  Intolerant; is on hydralazine  BB:  deferred given inotropes  Aldosterone antagonist:  Deferred due to hyperkalemia  SCD prophylaxis:  ICD  Fluid status:  euvolemic  Sleep Apnea Evaluation:  Reports central sleep apnea, but does not tolerate CPAP.  Advised that he consider repeating sleep study and evaluating possible options for treatment.    2. H/O NSVT. Episode on October with >200 episodes of NSVT. Device check today only showed 2 NSVT episodes. He is not on antiarrhythmic or a beta blocker. Continue to monitor    35 minutes spent in direct care, >50% in counseling          CC  Patient Care Team:  Bashir Hines as PCP - General (Family Practice)  Lorena Watkins MD as MD (Cardiology)  Sparkle Joel, RN as Nurse Coordinator (Cardiology)  Cecelia Palacios, RN as Nurse Coordinator (Cardiology)  Deysi Mattson APRN CNP as Nurse Practitioner (Cardiology)  Kalli Purcell APRN CNP as Nurse Practitioner (Cardiology)  Jermain Hobbs, ARAVIND as Nurse Coordinator (Cardiology)  Giselle Johnson, ARAVIND as Nurse Coordinator (Cardiology)  Candido Mendez MD as MD (Gastroenterology)  SELF, REFERRED

## 2018-11-26 NOTE — NURSING NOTE
Chief Complaint   Patient presents with     RECHECK      63 year old male presents with chronic systolic heart failure for hospital follow up with labs prior     Theodore Ac CMA at 10:34 AM on 11/26/2018     Medications and vitals reviewed with patient and confirmed.

## 2018-11-27 ENCOUNTER — TELEPHONE (OUTPATIENT)
Dept: CARDIOLOGY | Facility: CLINIC | Age: 63
End: 2018-11-27

## 2018-11-27 ENCOUNTER — CARE COORDINATION (OUTPATIENT)
Dept: CARDIOLOGY | Facility: CLINIC | Age: 63
End: 2018-11-27

## 2018-11-27 NOTE — PROGRESS NOTES
This is a recent snapshot of the patient's [facility] medical record.  For current drug dose and complete information and questions, call [phone] or In Basket pool, fv home infusion ([id number])  CSN Number:  [id number]

## 2018-11-27 NOTE — PROGRESS NOTES
This is a recent snapshot of the patient's [facility] medical record.  For current drug dose and complete information and questions, call [phone] or In Basket pool, fv home infusion ([id number])  CSN Number:  [id number]

## 2018-11-27 NOTE — TELEPHONE ENCOUNTER
[facility] Call Center    Phone Message    May a detailed message be left on voicemail: yes    Reason for Call: Symptoms or Concerns     If patient has red-flag symptoms, warm transfer to triage line    Current symptom or concern: Hard time breathing while trying to sleep last night (states his breathing has been fine so far today). Pt states he has had sleep apnea in the past, and is wondering if that could possibly be a concern again. Pt says he feels like he's retaining water, despite his dry weight being 184 yesterday and 178 today.     Symptoms have been present for:  1 day(s)    Has patient previously been seen for this? No    By : n/a    Date: n/a    Are there any new or worsening symptoms? No      Action Taken: Message routed to:  [facility] ([facility] Cardiology

## 2018-11-27 NOTE — TELEPHONE ENCOUNTER
"I called Fabiano back.  States he wasn't able to sleep last night; \"breathing was difficult, heavy chest.\" Also states he knows he's having anxiety about everything/possibility of VAD, new milrinone etc.  Minoo decreased his torsemide yesterday; home weight yesterday was 178, today was 180.  Discussed with Minoo Lopez and called Fabiano back. Fabiano verbalizes understanding and agrees with plan of care. Sparkle Joel RN      Date: 11/27/2018  Time of Call: 11:52 AM  Diagnosis:  Heart failure  VORB - Ordering provider: Minoo Lopez NP   Order: Increase torsemide back to 80mg BID. Increase potassium back to 60mEqs BID  Order received by: Sparkle Joel RN   Follow-up/additional notes: BERNARD Linares RNCC- will be contacting Fabiano with VAD plans for next week       "

## 2018-11-28 ENCOUNTER — APPOINTMENT (OUTPATIENT)
Dept: GENERAL RADIOLOGY | Facility: CLINIC | Age: 63
DRG: 001 | End: 2018-11-28
Attending: NURSE PRACTITIONER
Payer: COMMERCIAL

## 2018-11-28 ENCOUNTER — HOSPITAL ENCOUNTER (INPATIENT)
Facility: CLINIC | Age: 63
LOS: 20 days | Discharge: HOME | DRG: 001 | End: 2018-12-18
Attending: INTERNAL MEDICINE | Admitting: INTERNAL MEDICINE
Payer: COMMERCIAL

## 2018-11-28 DIAGNOSIS — G47.34 NOCTURNAL OXYGEN DESATURATION: ICD-10-CM

## 2018-11-28 DIAGNOSIS — Z95.811 LVAD (LEFT VENTRICULAR ASSIST DEVICE) PRESENT (H): ICD-10-CM

## 2018-11-28 DIAGNOSIS — I50.23 ACUTE ON CHRONIC SYSTOLIC HEART FAILURE (H): Primary | ICD-10-CM

## 2018-11-28 LAB
ALBUMIN SERPL-MCNC: 3.6 G/DL (ref 3.4–5)
ALP SERPL-CCNC: 86 U/L (ref 40–150)
ALT SERPL W P-5'-P-CCNC: 16 U/L (ref 0–70)
ANION GAP SERPL CALCULATED.3IONS-SCNC: 7 MMOL/L (ref 3–14)
ANION GAP SERPL CALCULATED.3IONS-SCNC: 8 MMOL/L (ref 3–14)
AST SERPL W P-5'-P-CCNC: 17 U/L (ref 0–45)
BASE EXCESS BLDV CALC-SCNC: 4.3 MMOL/L
BILIRUB DIRECT SERPL-MCNC: 1.1 MG/DL (ref 0–0.2)
BILIRUB SERPL-MCNC: 2.3 MG/DL (ref 0.2–1.3)
BUN SERPL-MCNC: 30 MG/DL (ref 7–30)
BUN SERPL-MCNC: 30 MG/DL (ref 7–30)
CALCIUM SERPL-MCNC: 8.8 MG/DL (ref 8.5–10.1)
CALCIUM SERPL-MCNC: 9 MG/DL (ref 8.5–10.1)
CHLORIDE SERPL-SCNC: 99 MMOL/L (ref 94–109)
CHLORIDE SERPL-SCNC: 99 MMOL/L (ref 94–109)
CO2 SERPL-SCNC: 26 MMOL/L (ref 20–32)
CO2 SERPL-SCNC: 26 MMOL/L (ref 20–32)
CREAT SERPL-MCNC: 1.29 MG/DL (ref 0.66–1.25)
CREAT SERPL-MCNC: 1.32 MG/DL (ref 0.66–1.25)
ERYTHROCYTE [DISTWIDTH] IN BLOOD BY AUTOMATED COUNT: 17.5 % (ref 10–15)
GFR SERPL CREATININE-BSD FRML MDRD: 55 ML/MIN/1.7M2
GFR SERPL CREATININE-BSD FRML MDRD: 56 ML/MIN/1.7M2
GLUCOSE BLDC GLUCOMTR-MCNC: 125 MG/DL (ref 70–99)
GLUCOSE SERPL-MCNC: 120 MG/DL (ref 70–99)
GLUCOSE SERPL-MCNC: 83 MG/DL (ref 70–99)
HCO3 BLDV-SCNC: 29 MMOL/L (ref 21–28)
HCT VFR BLD AUTO: 48.8 % (ref 40–53)
HGB BLD-MCNC: 16.2 G/DL (ref 13.3–17.7)
LACTATE BLD-SCNC: 1.8 MMOL/L (ref 0.7–2)
MAGNESIUM SERPL-MCNC: 2.2 MG/DL (ref 1.6–2.3)
MCH RBC QN AUTO: 31.8 PG (ref 26.5–33)
MCHC RBC AUTO-ENTMCNC: 33.2 G/DL (ref 31.5–36.5)
MCV RBC AUTO: 96 FL (ref 78–100)
O2/TOTAL GAS SETTING VFR VENT: 21 %
OXYHGB MFR BLDV: 55 %
PCO2 BLDV: 41 MM HG (ref 40–50)
PH BLDV: 7.45 PH (ref 7.32–7.43)
PLATELET # BLD AUTO: 192 10E9/L (ref 150–450)
PO2 BLDV: 31 MM HG (ref 25–47)
POTASSIUM SERPL-SCNC: 3.6 MMOL/L (ref 3.4–5.3)
POTASSIUM SERPL-SCNC: 3.9 MMOL/L (ref 3.4–5.3)
PROT SERPL-MCNC: 7.6 G/DL (ref 6.8–8.8)
RBC # BLD AUTO: 5.1 10E12/L (ref 4.4–5.9)
SODIUM SERPL-SCNC: 132 MMOL/L (ref 133–144)
SODIUM SERPL-SCNC: 134 MMOL/L (ref 133–144)
WBC # BLD AUTO: 9 10E9/L (ref 4–11)

## 2018-11-28 PROCEDURE — 25000132 ZZH RX MED GY IP 250 OP 250 PS 637: Performed by: NURSE PRACTITIONER

## 2018-11-28 PROCEDURE — 25000128 H RX IP 250 OP 636: Performed by: NURSE PRACTITIONER

## 2018-11-28 PROCEDURE — 00000146 ZZHCL STATISTIC GLUCOSE BY METER IP

## 2018-11-28 PROCEDURE — 83605 ASSAY OF LACTIC ACID: CPT | Performed by: NURSE PRACTITIONER

## 2018-11-28 PROCEDURE — 80076 HEPATIC FUNCTION PANEL: CPT | Performed by: NURSE PRACTITIONER

## 2018-11-28 PROCEDURE — 25000128 H RX IP 250 OP 636: Performed by: STUDENT IN AN ORGANIZED HEALTH CARE EDUCATION/TRAINING PROGRAM

## 2018-11-28 PROCEDURE — 82805 BLOOD GASES W/O2 SATURATION: CPT | Performed by: NURSE PRACTITIONER

## 2018-11-28 PROCEDURE — 83735 ASSAY OF MAGNESIUM: CPT | Performed by: NURSE PRACTITIONER

## 2018-11-28 PROCEDURE — 99223 1ST HOSP IP/OBS HIGH 75: CPT | Mod: GC | Performed by: INTERNAL MEDICINE

## 2018-11-28 PROCEDURE — 85027 COMPLETE CBC AUTOMATED: CPT | Performed by: NURSE PRACTITIONER

## 2018-11-28 PROCEDURE — 36592 COLLECT BLOOD FROM PICC: CPT | Performed by: NURSE PRACTITIONER

## 2018-11-28 PROCEDURE — 36415 COLL VENOUS BLD VENIPUNCTURE: CPT | Performed by: NURSE PRACTITIONER

## 2018-11-28 PROCEDURE — 71045 X-RAY EXAM CHEST 1 VIEW: CPT

## 2018-11-28 PROCEDURE — 21400006 ZZH R&B CCU INTERMEDIATE UMMC

## 2018-11-28 PROCEDURE — 80048 BASIC METABOLIC PNL TOTAL CA: CPT | Performed by: NURSE PRACTITIONER

## 2018-11-28 RX ORDER — DOCUSATE SODIUM 100 MG/1
100 CAPSULE, LIQUID FILLED ORAL 2 TIMES DAILY
Status: DISCONTINUED | OUTPATIENT
Start: 2018-11-28 | End: 2018-11-30

## 2018-11-28 RX ORDER — TRAZODONE HYDROCHLORIDE 100 MG/1
100 TABLET ORAL
Status: DISCONTINUED | OUTPATIENT
Start: 2018-11-28 | End: 2018-12-18 | Stop reason: HOSPADM

## 2018-11-28 RX ORDER — ATORVASTATIN CALCIUM 10 MG/1
10 TABLET, FILM COATED ORAL DAILY
Status: DISCONTINUED | OUTPATIENT
Start: 2018-11-29 | End: 2018-12-13

## 2018-11-28 RX ORDER — ISOSORBIDE DINITRATE 10 MG/1
20 TABLET ORAL 3 TIMES DAILY
Status: DISCONTINUED | OUTPATIENT
Start: 2018-11-28 | End: 2018-12-05

## 2018-11-28 RX ORDER — FLUORIDE TOOTHPASTE
10 TOOTHPASTE DENTAL 4 TIMES DAILY PRN
Status: DISCONTINUED | OUTPATIENT
Start: 2018-11-28 | End: 2018-12-18 | Stop reason: HOSPADM

## 2018-11-28 RX ORDER — POTASSIUM CHLORIDE 750 MG/1
40 TABLET, EXTENDED RELEASE ORAL 2 TIMES DAILY
Status: DISCONTINUED | OUTPATIENT
Start: 2018-11-28 | End: 2018-12-05

## 2018-11-28 RX ORDER — DEXTROSE MONOHYDRATE 25 G/50ML
25-50 INJECTION, SOLUTION INTRAVENOUS
Status: DISCONTINUED | OUTPATIENT
Start: 2018-11-28 | End: 2018-12-05

## 2018-11-28 RX ORDER — POTASSIUM CHLORIDE 1.5 G/1.58G
20-40 POWDER, FOR SOLUTION ORAL
Status: DISCONTINUED | OUTPATIENT
Start: 2018-11-28 | End: 2018-12-05

## 2018-11-28 RX ORDER — POTASSIUM CL/LIDO/0.9 % NACL 10MEQ/0.1L
10 INTRAVENOUS SOLUTION, PIGGYBACK (ML) INTRAVENOUS
Status: DISCONTINUED | OUTPATIENT
Start: 2018-11-28 | End: 2018-12-05

## 2018-11-28 RX ORDER — BUMETANIDE 0.25 MG/ML
3 INJECTION INTRAMUSCULAR; INTRAVENOUS ONCE
Status: COMPLETED | OUTPATIENT
Start: 2018-11-28 | End: 2018-11-28

## 2018-11-28 RX ORDER — LANOLIN ALCOHOL/MO/W.PET/CERES
100 CREAM (GRAM) TOPICAL DAILY
Status: DISCONTINUED | OUTPATIENT
Start: 2018-11-29 | End: 2018-12-18 | Stop reason: HOSPADM

## 2018-11-28 RX ORDER — HEPARIN SODIUM,PORCINE 10 UNIT/ML
5-10 VIAL (ML) INTRAVENOUS
Status: DISCONTINUED | OUTPATIENT
Start: 2018-11-28 | End: 2018-12-05

## 2018-11-28 RX ORDER — MILRINONE LACTATE 0.2 MG/ML
0.12 INJECTION, SOLUTION INTRAVENOUS CONTINUOUS
Status: DISCONTINUED | OUTPATIENT
Start: 2018-11-28 | End: 2018-12-05

## 2018-11-28 RX ORDER — POTASSIUM CHLORIDE 7.45 MG/ML
10 INJECTION INTRAVENOUS
Status: DISCONTINUED | OUTPATIENT
Start: 2018-11-28 | End: 2018-12-05

## 2018-11-28 RX ORDER — POLYETHYLENE GLYCOL 3350 17 G/17G
17 POWDER, FOR SOLUTION ORAL DAILY PRN
Status: DISCONTINUED | OUTPATIENT
Start: 2018-11-28 | End: 2018-12-05

## 2018-11-28 RX ORDER — LIDOCAINE 40 MG/G
CREAM TOPICAL
Status: DISCONTINUED | OUTPATIENT
Start: 2018-11-28 | End: 2018-12-05

## 2018-11-28 RX ORDER — NICOTINE POLACRILEX 4 MG
15-30 LOZENGE BUCCAL
Status: DISCONTINUED | OUTPATIENT
Start: 2018-11-28 | End: 2018-12-05

## 2018-11-28 RX ORDER — ASPIRIN 81 MG/1
81 TABLET ORAL DAILY
Status: DISCONTINUED | OUTPATIENT
Start: 2018-11-29 | End: 2018-12-05

## 2018-11-28 RX ORDER — POTASSIUM CHLORIDE 29.8 MG/ML
20 INJECTION INTRAVENOUS
Status: DISCONTINUED | OUTPATIENT
Start: 2018-11-28 | End: 2018-12-05

## 2018-11-28 RX ORDER — NALOXONE HYDROCHLORIDE 0.4 MG/ML
.1-.4 INJECTION, SOLUTION INTRAMUSCULAR; INTRAVENOUS; SUBCUTANEOUS
Status: DISCONTINUED | OUTPATIENT
Start: 2018-11-28 | End: 2018-12-05

## 2018-11-28 RX ORDER — POTASSIUM CHLORIDE 750 MG/1
20-40 TABLET, EXTENDED RELEASE ORAL
Status: DISCONTINUED | OUTPATIENT
Start: 2018-11-28 | End: 2018-12-05

## 2018-11-28 RX ORDER — HEPARIN SODIUM,PORCINE 10 UNIT/ML
5-10 VIAL (ML) INTRAVENOUS EVERY 24 HOURS
Status: DISCONTINUED | OUTPATIENT
Start: 2018-11-28 | End: 2018-12-05

## 2018-11-28 RX ORDER — MAGNESIUM SULFATE HEPTAHYDRATE 40 MG/ML
4 INJECTION, SOLUTION INTRAVENOUS EVERY 4 HOURS PRN
Status: DISCONTINUED | OUTPATIENT
Start: 2018-11-28 | End: 2018-12-05

## 2018-11-28 RX ADMIN — BUMETANIDE 3 MG: 0.25 INJECTION INTRAMUSCULAR; INTRAVENOUS at 23:09

## 2018-11-28 RX ADMIN — BUMETANIDE 3 MG: 0.25 INJECTION INTRAMUSCULAR; INTRAVENOUS at 14:32

## 2018-11-28 RX ADMIN — SODIUM CHLORIDE, PRESERVATIVE FREE 5 ML: 5 INJECTION INTRAVENOUS at 20:09

## 2018-11-28 RX ADMIN — ISOSORBIDE DINITRATE 20 MG: 20 TABLET ORAL at 20:03

## 2018-11-28 RX ADMIN — HYDRALAZINE HYDROCHLORIDE 75 MG: 50 TABLET ORAL at 14:32

## 2018-11-28 RX ADMIN — POTASSIUM CHLORIDE 40 MEQ: 750 TABLET, EXTENDED RELEASE ORAL at 20:03

## 2018-11-28 RX ADMIN — ISOSORBIDE DINITRATE 20 MG: 20 TABLET ORAL at 14:33

## 2018-11-28 RX ADMIN — HYDRALAZINE HYDROCHLORIDE 75 MG: 50 TABLET ORAL at 20:03

## 2018-11-28 RX ADMIN — POTASSIUM CHLORIDE 20 MEQ: 750 TABLET, EXTENDED RELEASE ORAL at 21:14

## 2018-11-28 RX ADMIN — MILRINONE LACTATE 0.12 MCG/KG/MIN: 200 INJECTION, SOLUTION INTRAVENOUS at 14:19

## 2018-11-28 ASSESSMENT — ACTIVITIES OF DAILY LIVING (ADL)
BATHING: 0-->INDEPENDENT
SWALLOWING: 0-->SWALLOWS FOODS/LIQUIDS WITHOUT DIFFICULTY
TOILETING: 0-->INDEPENDENT
RETIRED_COMMUNICATION: 0-->UNDERSTANDS/COMMUNICATES WITHOUT DIFFICULTY
TRANSFERRING: 0-->INDEPENDENT
RETIRED_EATING: 0-->INDEPENDENT
ADLS_ACUITY_SCORE: 9
ADLS_ACUITY_SCORE: 9
AMBULATION: 0-->INDEPENDENT
COGNITION: 0 - NO COGNITION ISSUES REPORTED
DRESS: 0-->INDEPENDENT
FALL_HISTORY_WITHIN_LAST_SIX_MONTHS: NO

## 2018-11-28 NOTE — LETTER
NOTIFICATION OF SPECIAL MEDICAL EQUIPMENT  I, Lorena Watkins, a physician licensed in the [facility], do hereby attest to the existence of life sustaining equipment at the home of the below individual. I recommend that power never be interrupted from the below residence and also that special attention be given to this customer in the event of disruption of power service. Lack of power could result in patient injury or death.  Name of Patient: Danielito Chu  Address:   [address] [place] [address]  Type of medical equipment:     Left Ventricular Assist Device (HeartMate 3)  Length of time equipment will be necessary: Indefinitely   Is this equipment life supporting?   Yes   Physician Name:  No name on file.     Thank you for your consideration in this important manor,              For the purpose of the form and its possible verification, I hereby authorize release of medical information to [employer] or its representative. (Fax: [phone], phone: [phone])    Signature of patient or legal guardian: _____________________________________               Date:__________

## 2018-11-28 NOTE — LETTER
NOTIFICATION OF SPECIAL MEDICAL EQUIPMENT  I, Lorena Watkins, a physician licensed in the [facility], do hereby attest to the existence of life sustaining equipment at the home of the below individual. I recommend that power never be interrupted from the below residence and also that special attention be given to this customer in the event of disruption of power service. Lack of power could result in patient injury or death.  Name of Patient: Danielito Chu  Address:   [address] [place] [address]  Type of medical equipment:     Left Ventricular Assist Device (HeartMate 3)  Length of time equipment will be necessary: Indefinitely   Is this equipment life supporting?   Yes   Physician Name:  No name on file.     Thank you for your consideration in this important manor,          For the purpose of the form and its possible verification, I hereby authorize release of medical information to [employer] or its representative (Fax: [phone], Phone: [phone]).     Signature of patient or legal guardian: _____________________________________               Date:__________

## 2018-11-28 NOTE — LETTER
Transition Communication Hand-off for Care Transitions to Next Level of Care Provider    Name: Danielito Chu  : 1955  MRN #: [id number]  Primary Care Provider: Bryan Orellana     Primary Clinic: [address] [address]     Reason for Hospitalization:  Congestive heart failure  Acute on chronic systolic heart failure (H)  Heart Failure   Heart failure (H)  Admit Date/Time: 2018 12:53 PM  Discharge Date: 18  Payor Source: Payor: PREFERREDONE / Plan: [employer]REF[employer]EDONE HMO / Product Type: PPO   Readmission Assessment Measure (ANNABELLE) Risk Score/category: high  Reason for Communication Hand-off Referral: Multiple providers/specialties  Discharge Plan:  Discharge Needs Assessment:  Needs      Most Recent Value   Equipment Currently Used at Home  none   Transportation Available  family or friend will provide, car   Other Resources  Other (see comment)   Other  -- [[facility]: [phone]]      Follow-up specialty is recommended: Yes    Follow-up plan:    Future Appointments   Date Time Provider Department Center          2018 10:00 AM UC LAB [facility]   2018  1:00 PM UC LAB [facility]   2018  1:30 PM UC CV DEVICE 1 [facility]   2018  2:00 PM Minoo Lopez NP [facility]   2019 12:30 PM UC LAB [facility]   2019  1:00 PM Minoo Lopez NP [facility]   1/15/2019  1:00 PM UU LAB GOLD WAITING [facility]   1/15/2019  1:30 PM UUECHR2 [facility]   1/15/2019  3:00 PM Kylie Faye NP [facility]   1/15/2019  4:00 PM Andrei Silva MD [facility]   2019  1:30 PM UC LAB [facility]   2019  2:00 PM Gema Chin NP [facility]   3/22/2019 12:30 PM  LAB [facility]   3/22/2019  1:00 PM [facility]   3/22/2019  1:30 PM Lorena Watkins MD [facility]   2019 12:00 AM  ICD REMOTE [facility]   2019  1:00 PM  LAB [facility]  [facility]   6/7/2019  1:30 PM  CV DEVICE 1 [facility]   6/7/2019  2:00 PM Lorena Watkins MD [facility]   Any outstanding tests or procedures:        Referrals     Future Labs/Procedures    Cardiac Rehab Referral     Process Instructions:    Advance to Wellness and Exercise for Life (WEL) Program or to another maintenance exercise program upon completion of supervised exercise therapy.    Weight mgt program is only offered at [facility].    *This therapy referral will be filtered to a centralized scheduling office at [facility] and the patient will receive a call to schedule an appointment at a [place] location most convenient for them. *        Comments:    If you have not heard from the scheduling office within 2 business days, please call [phone] for all locations, with the exception of [place], please call [phone] and [place], please call [phone].    Please be aware that coverage of these services is subject to the terms and limitations of your health insurance plan.  Call member services at your health plan with any benefit or coverage questions.    INR Clinic Referral     Comments:    INR and Warfarin Monitoring (Goal=2-3) for LVAD.   Indication for Anticoagulation: LVAD.   Dr. Lorena Watkins to follow.    First INR check Thursday 12/20/18    Medication Therapy Management Referral     Comments:    MTM referral reason            Patient has 5 PTA or Discharge Medications AND one of the following   diagnoses: DM,HF,COPD,AMI DX,PULM HTN       This service is designed to help you get the most from your medications.  A specially trained pharmacist will work closely with you and your doctors  to solve any problems related to your medications and to help you get the   best results from taking them.      The Medication Therapy Management staff will call you to schedule an appointment.            Key Recommendations:  Post hospitalization follow up.     GILBERT SOTO,  RN CC

## 2018-11-28 NOTE — PLAN OF CARE
Problem: Patient Care Overview  Goal: Plan of Care/Patient Progress Review  Outcome: No Change  D:Admitted from home accompanied by  Sister.   Has a  DL  PICC RT  With milrinone infusing from CADD  Home pump.    Was switched to inpatient pump.   Milrinone at  0.125mcg/kg/min:3:1 ml/hr (dose rate 82.4kg)  Has reported no chest pain or shortness of breath.   Was given 3mg of IV bumex as ordered.   ,  Bp 112/77 MAP 89  RR 16  O2 sat 95% on RA.    A:Is in no acute distress.   Breathing easy with normal sats on room air.  Mild tachycardia.   Bp is stable.   Condition is stable.    P;Continue to assess status.  Monitor rhythm,  Vital signs,  O2 sats and strict I/O.   Up ad  Margret.  Notify MD with any acute changes.

## 2018-11-28 NOTE — H&P
[facility]   Cardiology II Service / Advanced Heart Failure  History & Physical    Danielito Chu  : 1955  MRN # [id number]    ADMIT DATE: 2018    PCP: Bryan Orellana    CHIEF COMPLAINT: orthopnea    HPI: Danielito Chu is a 63 year old male who presents with complaints of orthopnea and difficulty sleeping.  He was recently discharged on  with IV milrinone therapy and plan was for readmission next Wednesday for LVAD implantation.  His torsemide dose was recently increased by phone.  Reports his breathing is otherwise unchanged, he denies change in dyspnea on exertion and does not experience any dyspnea at rest.  He denies lower extremity edema but notes he never has this.  Feels mild abdominal bloating but appetite is been okay.  Denies lightheadedness dizziness passing out episodes, palpitations, chest pain on exertion.  He endorses medication compliance.  During prior admission his last set of hemodynamics showed an RA of 14 PA of 42/22 wedge pressure of 26 and cardiac index of 1.8 on IV milrinone.  He was also discharged on hydralazine and isosorbide.  Note prior symptoms of decompensation include dizziness, vomiting, and fatigue.     ASSESSMENT & PLAN:  Danielito Chu is a 63 year old male who presents with orthopnea and signs of mild fluid overload despite home IV inotropy.     Today's Plan:  -IV Bumex 3 mg x1, goal net neg 1.5L  -continue milrinone 0/125 mcg/kg/min via PICC  -q12hr BMP    # Inotrope dependence  # Acute on chronic systolic heart failure 2/2 NICM  Stage D, NYHA class IV. His lab work on admission does not show evidence of worsening end-organ function, PICC sat is 55%. Will plan for diuresis and same dose of milrinone for now.     - Inotrope: Milrinone 0.125mcg/kg/min via PICC  - Diuretic: Bumex 3 mg IV to start, goal net neg 1.5L  - ACEi/ARB/ARNI: Discontinued last admission  - Afterload reduction: Hydralazine 75 Q8h , isosorbide dinitrate  20mg TID  - Aldosterone antagonist: Discontinued in setting of hyperkalemia prior admission  - BB: Conttaindicated due to inotropy/low cardiac output  - SCD ppx: [employer] ICD implanted 4/7/18   - daily weights, q12hr lytes, tele  - Other: plan is for LVAD implant next Wednesday      # F3 hepatic fibrosis  s/p transjugular liver biopsy on 11/19 which showed evidence of regenerative nodules along with fibrous bands. Minimal steatosis present. His HVPG was measured multiple times and they were all elevated- max at 70 mmHg which did not clinically correlated with his picture. Had EGD for EV surveillance on 11/21- negative for EV, no evidence of portal hypertension on imaging or EGD.      # Depression: Wellbutrin on med list, patient not taking, hold for now  # Recent gout flare: Occured at R great toe, in the setting of diuresis s/p pred taper.   - monitor with diuresis    FEN: 2G na, 2L FR  PROPHY:  Mechanical (multiple bruising over abdomen from prior injections), PPI  LINES:  PICC  DISPO:  Likely inpatient through LVAD implant  CODE STATUS:  Full code    ROS:   CONSTITUTIONAL: Denies fever, chills, fatigue. +Weight loss overall.   HEAD: Denies headache.  EENT: Denies vision changes, hearing changes, dysphagia, or sore throat.   NECK: Denies lymphadenopathy.   CV:See HPI  PULMONARY:See HPI  GI:Denies nausea, vomiting, diarrhea, and abdominal pain. Bowel movements are regular.   :Denies urinary alterations, dysuria, urinary frequency, hematuria, and abnormal drainage.   EXT:Denies lower extremity edema.   SKIN:Denies abnormal rashes or lesions.   MUSCULOSKELETAL:Denies upper or lower extremity weakness and pain.   NEUROLOGIC:Denies lightheadedness, dizziness, seizures, or upper or lower extremity paresthesia.   HEMATOLOGICAL:No abnormal bruising or bleeding.   PSYCHIATRIC:Denies any mood alterations.     PMH:  Past Medical History:   Diagnosis Date     Acute systolic congestive heart failure (H) 4/5/2017      Diabetes (H)      HTN (hypertension)      Hyperlipidemia      Mitral regurgitation      Nocturnal oxygen desaturation 3/29/2018     Nonischemic cardiomyopathy (H) 4/5/2017     SHIRLEY (obstructive sleep apnea)      Pacemaker 04/07/2017    ICD 4/7/17       PSH:  Past Surgical History:   Procedure Laterality Date     ESOPHAGOSCOPY, GASTROSCOPY, DUODENOSCOPY (EGD), COMBINED N/A 11/21/2018    Procedure: COMBINED ESOPHAGOSCOPY, GASTROSCOPY, DUODENOSCOPY (EGD);  Surgeon: Candido Mendez MD;  Location:  GI     IMPLANT IMPLANTABLE CARDIOVERTER DEFIBRILLATOR         MEDICATIONS:  Prior to Admission Medications   Prescriptions Last Dose Informant Patient Reported? Taking?   Blood Glucose Monitoring Suppl (BLOOD GLUCOSE MONITOR SYSTEM) w/Device KIT   Yes No   Sig: three times a week   artificial saliva (BIOTENE DRY MOUTHWASH) LIQD liquid   No No   Sig: Swish and spit 10 mLs in mouth 4 times daily as needed for dry mouth   aspirin EC 81 MG EC tablet   No No   Sig: Take 1 tablet (81 mg) by mouth daily   atorvastatin (LIPITOR) 10 MG tablet   No No   Sig: Take 1 tablet (10 mg) by mouth daily   hydrALAZINE (APRESOLINE) 25 MG tablet   No No   Sig: Take 3 tablets (75 mg) by mouth 3 times daily   isosorbide dinitrate (ISORDIL) 20 MG tablet   No No   Sig: Take 1 tablet (20 mg) by mouth 3 times daily   milrinone (PRIMACOR) infusion 200 mcg/mL PREMIX   No No   Sig: Inject 10.3 mcg/min into the vein continuous   omeprazole (PRILOSEC) 20 MG CR capsule   No No   Sig: Take 1 capsule (20 mg) by mouth every morning (before breakfast)   potassium chloride ER (K-TAB) 20 MEQ ER tablet   No No   Sig: Take 2 tablets (40 mEq) by mouth 2 times daily   thiamine 100 MG tablet   No No   Sig: Take 1 tablet (100 mg) by mouth daily   torsemide (DEMADEX) 20 MG tablet   No No   Sig: Take 2-4 tablets (40-80 mg) by mouth 2 times daily Take 80 mg in the morning and 40 mg in the afternoon   traZODone (DESYREL) 100 MG tablet   No No   Sig: TAKE 1 TABLET BY  MOUTH AT BEDTIME AS NEEDED FOR SLEEP      Facility-Administered Medications: None        ALLERGIES:   No Known Allergies    FAMILY HISTORY:  Family History   Problem Relation Age of Onset     Heart Failure Father       at age 86     Heart Failure Paternal Uncle       at 66      Myocardial Infarction Paternal Uncle       at age 62     Coronary Artery Disease Mother       during CABG at age 41       SOCIAL HISTORY:  Social History     Marital status: Single     Social History Main Topics     Smoking status: Former Smoker     Smokeless tobacco: Never Used      Comment: quit 3/2017     Alcohol use Not on file      Comment: social     Drug use: No     PHYSICAL EXAM:  Blood pressure 98/73, temperature 98  F (36.7  C), temperature source Oral, resp. rate 16, SpO2 96 %.    GENERAL: Appears comfortable, in no acute distress.   HEENT: Eye symmetrical, no discharge or icterus bilaterally. Mucous membranes moist and without lesions.  NECK: Supple, JVD midneck at 75 degrees.   CV: Tachycardic and regular with ectopy, +S1S2, no murmur, rub, or gallop.   RESPIRATORY: Respirations regular, even, and unlabored. Lungs CTA throughout.   GI: Soft and mildly distended with normoactive bowel sounds present in all quadrants. No tenderness, rebound, guarding. No organomegaly.   EXTREMITIES: No peripheral edema. 2+ bilateral pedal pulses. Warm.    NEUROLOGIC: Alert and orientated x 3. No focal deficits.   MUSCULOSKELETAL: No joint swelling or tenderness.   SKIN: No jaundice. No rashes or lesions. Multiple areas of soft bruising over abdomen, no induration.     LABS:  CBC:  Recent Labs   Lab Test  18   0517   WBC  9.7   RBC  5.02   HGB  15.6   HCT  48.0   MCV  96   MCH  31.1   MCHC  32.5   RDW  16.6*   PLT  178       CMP:  Recent Labs   Lab Test  18   0932   NA  133   POTASSIUM  4.3   CHLORIDE  97   ASHLEE  8.8   CO2  27   BUN  32*   CR  1.36*   GLC  111*   AST  17   ALT  19   BILITOTAL  2.0*   ALBUMIN  3.8    PROTTOTAL  8.0   ALKPHOS  86       IMAGING:  CXR pending    Time/Communication  I personally spent a total of 40 minutes. Of that 25 minutes was counseling/coordination of patient's care. Plan of care discussed with patient. See my note above for details.          Aster Purcell DNP, NP-C  Advanced Heart Failure/Cardiology II Service  Pager [phone]  11/28/2018

## 2018-11-29 ENCOUNTER — APPOINTMENT (OUTPATIENT)
Dept: ULTRASOUND IMAGING | Facility: CLINIC | Age: 63
DRG: 001 | End: 2018-11-29
Attending: NURSE PRACTITIONER
Payer: COMMERCIAL

## 2018-11-29 LAB
ALBUMIN SERPL-MCNC: 3.6 G/DL (ref 3.4–5)
ALBUMIN UR-MCNC: 10 MG/DL
ALP SERPL-CCNC: 84 U/L (ref 40–150)
ALT SERPL W P-5'-P-CCNC: 14 U/L (ref 0–70)
ANION GAP SERPL CALCULATED.3IONS-SCNC: 8 MMOL/L (ref 3–14)
ANION GAP SERPL CALCULATED.3IONS-SCNC: 9 MMOL/L (ref 3–14)
APPEARANCE UR: CLEAR
AST SERPL W P-5'-P-CCNC: 14 U/L (ref 0–45)
BACTERIA #/AREA URNS HPF: ABNORMAL /HPF
BILIRUB DIRECT SERPL-MCNC: 1.2 MG/DL (ref 0–0.2)
BILIRUB SERPL-MCNC: 2.4 MG/DL (ref 0.2–1.3)
BILIRUB UR QL STRIP: NEGATIVE
BUN SERPL-MCNC: 25 MG/DL (ref 7–30)
BUN SERPL-MCNC: 30 MG/DL (ref 7–30)
CALCIUM SERPL-MCNC: 8.7 MG/DL (ref 8.5–10.1)
CALCIUM SERPL-MCNC: 9 MG/DL (ref 8.5–10.1)
CHLORIDE SERPL-SCNC: 99 MMOL/L (ref 94–109)
CHLORIDE SERPL-SCNC: 99 MMOL/L (ref 94–109)
CO2 SERPL-SCNC: 26 MMOL/L (ref 20–32)
CO2 SERPL-SCNC: 27 MMOL/L (ref 20–32)
COLOR UR AUTO: YELLOW
CREAT SERPL-MCNC: 1.14 MG/DL (ref 0.66–1.25)
CREAT SERPL-MCNC: 1.15 MG/DL (ref 0.66–1.25)
ERYTHROCYTE [DISTWIDTH] IN BLOOD BY AUTOMATED COUNT: 17.5 % (ref 10–15)
GFR SERPL CREATININE-BSD FRML MDRD: 64 ML/MIN/1.7M2
GFR SERPL CREATININE-BSD FRML MDRD: 65 ML/MIN/1.7M2
GLUCOSE BLDC GLUCOMTR-MCNC: 151 MG/DL (ref 70–99)
GLUCOSE BLDC GLUCOMTR-MCNC: 180 MG/DL (ref 70–99)
GLUCOSE BLDC GLUCOMTR-MCNC: 98 MG/DL (ref 70–99)
GLUCOSE SERPL-MCNC: 114 MG/DL (ref 70–99)
GLUCOSE SERPL-MCNC: 141 MG/DL (ref 70–99)
GLUCOSE UR STRIP-MCNC: NEGATIVE MG/DL
HCT VFR BLD AUTO: 47.7 % (ref 40–53)
HGB BLD-MCNC: 15.5 G/DL (ref 13.3–17.7)
HGB UR QL STRIP: NEGATIVE
HYALINE CASTS #/AREA URNS LPF: 3 /LPF (ref 0–2)
KETONES UR STRIP-MCNC: NEGATIVE MG/DL
LEUKOCYTE ESTERASE UR QL STRIP: NEGATIVE
MAGNESIUM SERPL-MCNC: 2.1 MG/DL (ref 1.6–2.3)
MAGNESIUM SERPL-MCNC: 2.2 MG/DL (ref 1.6–2.3)
MCH RBC QN AUTO: 31.3 PG (ref 26.5–33)
MCHC RBC AUTO-ENTMCNC: 32.5 G/DL (ref 31.5–36.5)
MCV RBC AUTO: 96 FL (ref 78–100)
MUCOUS THREADS #/AREA URNS LPF: PRESENT /LPF
NITRATE UR QL: NEGATIVE
PH UR STRIP: 7.5 PH (ref 5–7)
PLATELET # BLD AUTO: 187 10E9/L (ref 150–450)
POTASSIUM SERPL-SCNC: 3.2 MMOL/L (ref 3.4–5.3)
POTASSIUM SERPL-SCNC: 3.5 MMOL/L (ref 3.4–5.3)
POTASSIUM SERPL-SCNC: 4.7 MMOL/L (ref 3.4–5.3)
PROT SERPL-MCNC: 7.2 G/DL (ref 6.8–8.8)
RBC # BLD AUTO: 4.95 10E12/L (ref 4.4–5.9)
RBC #/AREA URNS AUTO: 1 /HPF (ref 0–2)
SODIUM SERPL-SCNC: 134 MMOL/L (ref 133–144)
SODIUM SERPL-SCNC: 134 MMOL/L (ref 133–144)
SOURCE: ABNORMAL
SP GR UR STRIP: 1.01 (ref 1–1.03)
SPERM #/AREA URNS HPF: PRESENT /HPF
SQUAMOUS #/AREA URNS AUTO: 3 /HPF (ref 0–1)
TRANS CELLS #/AREA URNS HPF: <1 /HPF (ref 0–1)
UROBILINOGEN UR STRIP-MCNC: 8 MG/DL (ref 0–2)
WBC # BLD AUTO: 10.6 10E9/L (ref 4–11)
WBC #/AREA URNS AUTO: 2 /HPF (ref 0–5)

## 2018-11-29 PROCEDURE — 80048 BASIC METABOLIC PNL TOTAL CA: CPT | Performed by: NURSE PRACTITIONER

## 2018-11-29 PROCEDURE — 21400006 ZZH R&B CCU INTERMEDIATE UMMC

## 2018-11-29 PROCEDURE — 83735 ASSAY OF MAGNESIUM: CPT | Performed by: NURSE PRACTITIONER

## 2018-11-29 PROCEDURE — 81001 URINALYSIS AUTO W/SCOPE: CPT | Performed by: NURSE PRACTITIONER

## 2018-11-29 PROCEDURE — 25000128 H RX IP 250 OP 636: Performed by: NURSE PRACTITIONER

## 2018-11-29 PROCEDURE — 84132 ASSAY OF SERUM POTASSIUM: CPT | Performed by: INTERNAL MEDICINE

## 2018-11-29 PROCEDURE — 99232 SBSQ HOSP IP/OBS MODERATE 35: CPT | Performed by: NURSE PRACTITIONER

## 2018-11-29 PROCEDURE — 93005 ELECTROCARDIOGRAM TRACING: CPT

## 2018-11-29 PROCEDURE — 40000802 ZZH SITE CHECK

## 2018-11-29 PROCEDURE — 80076 HEPATIC FUNCTION PANEL: CPT | Performed by: NURSE PRACTITIONER

## 2018-11-29 PROCEDURE — 76705 ECHO EXAM OF ABDOMEN: CPT

## 2018-11-29 PROCEDURE — 00000146 ZZHCL STATISTIC GLUCOSE BY METER IP

## 2018-11-29 PROCEDURE — 93010 ELECTROCARDIOGRAM REPORT: CPT | Performed by: INTERNAL MEDICINE

## 2018-11-29 PROCEDURE — 25000132 ZZH RX MED GY IP 250 OP 250 PS 637: Performed by: NURSE PRACTITIONER

## 2018-11-29 PROCEDURE — 25000128 H RX IP 250 OP 636: Performed by: STUDENT IN AN ORGANIZED HEALTH CARE EDUCATION/TRAINING PROGRAM

## 2018-11-29 PROCEDURE — 25000132 ZZH RX MED GY IP 250 OP 250 PS 637: Performed by: STUDENT IN AN ORGANIZED HEALTH CARE EDUCATION/TRAINING PROGRAM

## 2018-11-29 PROCEDURE — 25000131 ZZH RX MED GY IP 250 OP 636 PS 637: Performed by: NURSE PRACTITIONER

## 2018-11-29 PROCEDURE — 85027 COMPLETE CBC AUTOMATED: CPT | Performed by: INTERNAL MEDICINE

## 2018-11-29 RX ORDER — BUMETANIDE 0.25 MG/ML
3 INJECTION INTRAMUSCULAR; INTRAVENOUS ONCE
Status: COMPLETED | OUTPATIENT
Start: 2018-11-29 | End: 2018-11-29

## 2018-11-29 RX ORDER — SIMETHICONE 80 MG
80 TABLET,CHEWABLE ORAL ONCE
Status: COMPLETED | OUTPATIENT
Start: 2018-11-29 | End: 2018-11-29

## 2018-11-29 RX ADMIN — DOCUSATE SODIUM 100 MG: 100 CAPSULE, LIQUID FILLED ORAL at 19:44

## 2018-11-29 RX ADMIN — POTASSIUM CHLORIDE 20 MEQ: 750 TABLET, EXTENDED RELEASE ORAL at 07:16

## 2018-11-29 RX ADMIN — OMEPRAZOLE 20 MG: 20 CAPSULE, DELAYED RELEASE ORAL at 08:26

## 2018-11-29 RX ADMIN — INSULIN ASPART 1 UNITS: 100 INJECTION, SOLUTION INTRAVENOUS; SUBCUTANEOUS at 08:29

## 2018-11-29 RX ADMIN — SODIUM CHLORIDE, PRESERVATIVE FREE 5 ML: 5 INJECTION INTRAVENOUS at 17:54

## 2018-11-29 RX ADMIN — ISOSORBIDE DINITRATE 20 MG: 20 TABLET ORAL at 08:28

## 2018-11-29 RX ADMIN — ATORVASTATIN CALCIUM 10 MG: 10 TABLET, FILM COATED ORAL at 08:28

## 2018-11-29 RX ADMIN — POTASSIUM CHLORIDE 40 MEQ: 750 TABLET, EXTENDED RELEASE ORAL at 05:06

## 2018-11-29 RX ADMIN — MILRINONE LACTATE 0.12 MCG/KG/MIN: 200 INJECTION, SOLUTION INTRAVENOUS at 12:59

## 2018-11-29 RX ADMIN — THIAMINE HCL TAB 100 MG 100 MG: 100 TAB at 08:28

## 2018-11-29 RX ADMIN — INSULIN ASPART 1 UNITS: 100 INJECTION, SOLUTION INTRAVENOUS; SUBCUTANEOUS at 17:53

## 2018-11-29 RX ADMIN — HYDRALAZINE HYDROCHLORIDE 75 MG: 50 TABLET ORAL at 08:27

## 2018-11-29 RX ADMIN — DOCUSATE SODIUM 100 MG: 100 CAPSULE, LIQUID FILLED ORAL at 08:30

## 2018-11-29 RX ADMIN — ISOSORBIDE DINITRATE 20 MG: 20 TABLET ORAL at 13:11

## 2018-11-29 RX ADMIN — ISOSORBIDE DINITRATE 20 MG: 20 TABLET ORAL at 19:45

## 2018-11-29 RX ADMIN — POTASSIUM CHLORIDE 20 MEQ: 750 TABLET, EXTENDED RELEASE ORAL at 18:38

## 2018-11-29 RX ADMIN — BUMETANIDE 3 MG: 0.25 INJECTION INTRAMUSCULAR; INTRAVENOUS at 13:01

## 2018-11-29 RX ADMIN — POTASSIUM CHLORIDE 40 MEQ: 750 TABLET, EXTENDED RELEASE ORAL at 08:26

## 2018-11-29 RX ADMIN — HYDRALAZINE HYDROCHLORIDE 75 MG: 50 TABLET ORAL at 13:12

## 2018-11-29 RX ADMIN — POTASSIUM CHLORIDE 40 MEQ: 750 TABLET, EXTENDED RELEASE ORAL at 19:44

## 2018-11-29 RX ADMIN — SIMETHICONE CHEW TAB 80 MG 80 MG: 80 TABLET ORAL at 01:17

## 2018-11-29 RX ADMIN — HYDRALAZINE HYDROCHLORIDE 75 MG: 50 TABLET ORAL at 19:45

## 2018-11-29 RX ADMIN — ASPIRIN 81 MG: 81 TABLET, COATED ORAL at 08:28

## 2018-11-29 ASSESSMENT — PAIN DESCRIPTION - DESCRIPTORS
DESCRIPTORS: ACHING
DESCRIPTORS: DISCOMFORT
DESCRIPTORS: ACHING;CONSTANT
DESCRIPTORS: ACHING;DISCOMFORT
DESCRIPTORS: ACHING
DESCRIPTORS: ACHING

## 2018-11-29 ASSESSMENT — ACTIVITIES OF DAILY LIVING (ADL)
ADLS_ACUITY_SCORE: 9

## 2018-11-29 NOTE — PLAN OF CARE
"Problem: Cardiac Output Decreased (Adult)  Goal: Identify Related Risk Factors and Signs and Symptoms  Related risk factors and signs and symptoms are identified upon initiation of Human Response Clinical Practice Guideline (CPG).   Outcome: No Change  D:Patient reporting right upper abdominal pain.  Describes pain as feeling like someone \"hit me there\" and I \"feel it when I move or take a deep breath in\".   Denies chest pain or shortness of breath at the time.   ALBINA Jo was informed.   Liver Panel ordered.  Abdominal ultrasound completed.   Up ad moshe in room with no report of increased pain with activity.  Continue on Milrinone drip at .125mcg/kg/min:3.1 ml/hr.  Rhythm is ,  bp 102/78 MAP 79  RR 18 O2 sat 96% on room air.    A:Tolerating diet and activity.   Mild to moderate right upper quadrant abdominal pain.   No acute distress. No visible increase in pain with activity.   AVSS.   Rhythm stable.  Breathing easy with normal sats on room air.   Condition is stable.    P:Continue to assess level of  Comfort.  Notify MD espitia any increase in abdominal pain,  Respiratory symptoms.   Continue to monitor rhythm, vital signs,  MAP and O2 sats.  Up ad moshe.   Milrinone drip as ordered.      "

## 2018-11-29 NOTE — PLAN OF CARE
Problem: Patient Care Overview  Goal: Plan of Care/Patient Progress Review  Outcome: No Change  Pt with CHF continues on a milrinone drip at 0.125 mcgs/kg/min. Labs are PCU collect, last K+ 3.6, replaced with 20 meq KCL.ST 100s-120s with 0-20 PVCs, other VSS. Plan is to give the pt a HM3 in one week. C/o some SOB and trouble sleeping, sats 96-97% on RA. Voiding well with diuretics, not eating much. BG WNL No CP.

## 2018-11-29 NOTE — PROGRESS NOTES
LVAD Admission Psychosocial Assessment    Patient Name: Danielito Chu  : 1955  Age: 63 year old  MRN: [id number]  Date of Initial Social Work Evaluation: 10/23/18    Patient scheduled for LVAD next week on Tues/Wed, but got admitted to the hospital earlier for worsening heart function.  Met with Fabiano to update psychosocial assessment and provide education about SW role while inpatient, and to begin discussion of expectations/requirements, caregiver needs and follow up needs post-VAD implant.     Presenting Information   Living Situation: [place]  If not local, plans for short term stay:  Will need local lodging in a hotel or apartment nearby. Lists have been given to family during previous hospital stays. Sister will be main caregiver, but wants a place with the capability of having her Dog. They are aware of [facility], but that they do not allow animals. Per patient, they are looking on their own for a place to stay.  Previous Functional Status: Independent with ADL's, but sounds like he has been declining in how he has been feeling and functional ability over the last 4-6 weeks and has had multiple hospital admissions  Cultural/Language/Spiritual Considerations: None mentioned    Support System  Primary Support Person Fani Ngo  Other support:  Sig Other Yanna and Brother, Brenden. He does have another Brother in [place], who plans to come next week  Plan for support in immediate post-transplant period: SisterFani, has always been the identified person to be his main caregiver.    Health Care Directive  Decision Maker: Patient  Alternate Decision Maker: Per [facility] policy, would need to be his Siblings. Patient has stated he would prefer his Sister and Brother be the [facility]'s and he was given blank copy during eval and last hospitalization, which he was working on in his patient room the last time.  Health Care Directive: did not discuss today. See  above    Mental Health/Coping:   History of Mental Health: Admits to mild depression and takes Wellbutrin  History of Chemical Health: Reports ETOH or other illicit drugs have never been a problem. Admits to occasional smoking of marijuana and aware that he would need to abstain in order to ever be considered for heart transplant in the future.  Current status: During eval, admitted to smoking last one month prior to admission. Did not ask him today when his most recent use has been.  Coping: Displaying bright affect and seems to be coping appropiately  Services Needed/Recommended: None identified right now.    Financial   Income: Savings and long-disability  Impact of LVAD on income: No sig impact  Insurance and medication coverage: Private insurance-[employer]  Financial concerns: None mentioned  Resources needed:  None identified    Education provided by SW: Social Work role inpatient setting, availability of support groups, lodging options, need for 24-hour caregivers    Assessment and recommendations and plan:  Fabiano was admitted early for LVAD placement as he was declining at home. Seems prepared for VAD. States Sister, sig Other, Brother will be here Monday and they are looking for places to live nearby. ANDRE will continue to follow as patient scheduled to get LVAD next week. Will meet with Fabiano and Family next week to assist with VAD preparedness and discuss/address any psychosocial issues surrounding VAD placement.

## 2018-11-29 NOTE — PLAN OF CARE
Problem: Cardiac Output Decreased (Adult)  Goal: Identify Related Risk Factors and Signs and Symptoms  Related risk factors and signs and symptoms are identified upon initiation of Human Response Clinical Practice Guideline (CPG).   Outcome: No Change  /81 (BP Location: Left arm)  Pulse 102  Temp 97.7  F (36.5  C) (Oral)  Resp 16  Wt 82.1 kg (180 lb 14.4 oz)  SpO2 96%  BMI 26.71 kg/m2     11/28/18 1700   Cardiac Output Decreased   Related Risk Factors (Cardiac Output Decreased) heart failure   Signs and Symptoms (Cardiac Output Decreased) decreased ejection fraction, stroke volume       Shift: 9809-9902  Neuro: A&Ox4, intact.   Cardiac: Afebrile, VSS, NSR with PVC.    Respiratory: sating 96% on RA, c/o of some SOB when laying down.   GI/: Voiding spontaneously into urinal. No BM this shift.   Diet/appetite: Tolerating low saturated fat diet. Denies nausea.   Activity: Independent.   Pain: C/o of some abdominal pain, declined interventions.   Skin: Bruising on abdomen-per pt report from heparin, otherwise skin intact, no new deficits noted.   Lines: DL PICC infusing.   Drains: None.     Milrinone gtt infusing at 3.1ml/hr. Pt calls appropriately and makes needs known. K+ came back at 3.2 this AM. Replaced this AM. Recheck scheduled for 1000AM. Will continue to monitor and follow POC.       Addendum: Scribing RN was notified that patient had run of VT around [place]. Pt has not had additional episodes of VT since.

## 2018-11-29 NOTE — PROGRESS NOTES
[facility]   Cardiology II Service / Advanced Heart Failure  Daily Progress Note  Date of Service: 11/29/2018      Patient: Danielito Chu  MRN: [id number]  Admission Date: 11/28/2018  Hospital Day # 1    Assessment and Plan: Mr. Chu is a 61 year old male with a past medical history including HTN, SHIRLEY, Hyperlipidemia, NICM s/p ICD 4/7/17 on home inotropes. He presents for hypervolemia in prep for LVAD.     Chronic systolic heart failure secondary to unknown etiology, likely viral.   Stage D, NYHA Class III  ACEi/ARB: Hydralazine 75 mg po TID. Isordil 20 mg po TID.   BB None. Milrinone 0.125 mcg/kg/min.   Aldosterone antagonist Aldactone discontinued in setting of hyperkalemia.   SCD prophylaxis ICD  Fluid status: Hypervolemic. Bumex 3 mg IV   Sleep Apnea Evaluation: SHIRLEY on CPAP.   - Plan for LVAD implant next week as scheduled prior to admission.     RUQ pain.   - Hepatic panel at baseline.   - RUQ US negative for acute findings.   - EKG with no acute changes.   - Diuresis as above.     Chronic Medical Conditions:  Moderate Mitral Regurgitation. Echo consistent with mild mitral insufficiency 6/15/17.  Hyperlipidemia. Continue statin.    HTN. Continue Hydralazine and Isordil.   DM Type II. Novolog sliding scale insulin.     FEN: 2 gram NA diet   PROPHY:  Ambulation  LINES:  PICC  DISPO:  TBD   CODE STATUS: Full Code.   ================================================================    Interval History/ROS: He complains of PND, orthopnea, BAJWA, abdominal distention, RUQ tenderness, and LE edema. He denies fever, chills, chest pain, palpitations, cough, nausea, vomiting, and diarrhea. He is tolerating po and ambulation.     Last 24 hr care team notes reviewed.   ROS:  4 point ROS including Respiratory, CV, GI and , other than that noted in the HPI, is negative.     Medications: Reviewed in EPIC.     Physical Exam:   BP 99/84 (BP Location: Left arm)  Pulse 102  Temp 97.5  F (36.4  C) (Oral)   Resp 16  Wt 82.1 kg (180 lb 14.4 oz)  SpO2 96%  BMI 26.71 kg/m2  GENERAL: Appears alert and oriented times three.   HEENT: Eye symmetrical and free of discharge bilaterally. Mucous membranes moist and without lesions.  NECK: Supple and without lymphadenopathy. JVD to jaw.   CV: RRR, S1S2 present with Grade III/VI murmur heard at LSB.    RESPIRATORY: Respirations regular, even, and unlabored. Lungs CTA throughout.   GI: Soft and mildly distended with normoactive bowel sounds present in all quadrants. RUQ tenderness. No rebound or guarding. No organomegaly.   EXTREMITIES: +1 bilateral peripheral edema. 2+ bilateral pedal pulses.   NEUROLOGIC: Alert and orientated x 3. CN II-XII grossly intact. No focal deficits.   MUSCULOSKELETAL: No joint swelling or tenderness.   SKIN: No jaundice. No rashes or lesions.     Data:  CMP  Recent Labs  Lab 11/29/18  1125 11/29/18  0412 11/28/18  2020 11/28/18  1415 11/26/18  0932   NA  --  134 132* 134 133   POTASSIUM 4.7 3.2* 3.6 3.9 4.3   CHLORIDE  --  99 99 99 97   CO2  --  26 26 26 27   ANIONGAP  --  9 7 8 9   GLC  --  114* 120* 83 111*   BUN  --  30 30 30 32*   CR  --  1.14 1.32* 1.29* 1.36*   GFRESTIMATED  --  65 55* 56* 53*   GFRESTBLACK  --  79 66 68 64   ASHLEE  --  9.0 8.8 9.0 8.8   MAG  --  2.1 2.2  --   --    PROTTOTAL  --  7.2  --  7.6 8.0   ALBUMIN  --  3.6  --  3.6 3.8   BILITOTAL  --  2.4*  --  2.3* 2.0*   ALKPHOS  --  84  --  86 86   AST  --  14  --  17 17   ALT  --  14  --  16 19     CBC  Recent Labs  Lab 11/29/18  0515 11/28/18  1415   WBC 10.6 9.0   RBC 4.95 5.10   HGB 15.5 16.2   HCT 47.7 48.8   MCV 96 96   MCH 31.3 31.8   MCHC 32.5 33.2   RDW 17.5* 17.5*    192     Patient discussed with Dr. Watkins.      Deysi Mattson FNP  11/29/2018

## 2018-11-30 ENCOUNTER — TEAM CONFERENCE (OUTPATIENT)
Dept: CARDIOLOGY | Facility: CLINIC | Age: 63
End: 2018-11-30

## 2018-11-30 ENCOUNTER — APPOINTMENT (OUTPATIENT)
Dept: GENERAL RADIOLOGY | Facility: CLINIC | Age: 63
DRG: 001 | End: 2018-11-30
Attending: NURSE PRACTITIONER
Payer: COMMERCIAL

## 2018-11-30 LAB
ALBUMIN SERPL-MCNC: 3.5 G/DL (ref 3.4–5)
ALP SERPL-CCNC: 94 U/L (ref 40–150)
ALT SERPL W P-5'-P-CCNC: 15 U/L (ref 0–70)
AMYLASE SERPL-CCNC: 20 U/L (ref 30–110)
ANION GAP SERPL CALCULATED.3IONS-SCNC: 9 MMOL/L (ref 3–14)
AST SERPL W P-5'-P-CCNC: 17 U/L (ref 0–45)
BASE DEFICIT BLDV-SCNC: 7.9 MMOL/L
BILIRUB DIRECT SERPL-MCNC: 1.6 MG/DL (ref 0–0.2)
BILIRUB SERPL-MCNC: 3.5 MG/DL (ref 0.2–1.3)
BUN SERPL-MCNC: 25 MG/DL (ref 7–30)
CALCIUM SERPL-MCNC: 9.6 MG/DL (ref 8.5–10.1)
CHLORIDE SERPL-SCNC: 98 MMOL/L (ref 94–109)
CO2 SERPL-SCNC: 24 MMOL/L (ref 20–32)
CREAT SERPL-MCNC: 1.22 MG/DL (ref 0.66–1.25)
GFR SERPL CREATININE-BSD FRML MDRD: 60 ML/MIN/1.7M2
GLUCOSE BLDC GLUCOMTR-MCNC: 122 MG/DL (ref 70–99)
GLUCOSE BLDC GLUCOMTR-MCNC: 127 MG/DL (ref 70–99)
GLUCOSE BLDC GLUCOMTR-MCNC: 147 MG/DL (ref 70–99)
GLUCOSE BLDC GLUCOMTR-MCNC: 150 MG/DL (ref 70–99)
GLUCOSE SERPL-MCNC: 119 MG/DL (ref 70–99)
HCO3 BLDV-SCNC: 15 MMOL/L (ref 21–28)
INR PPP: 1.4 (ref 0.86–1.14)
INTERPRETATION ECG - MUSE: NORMAL
LACTATE BLD-SCNC: 0.7 MMOL/L (ref 0.7–2)
LIPASE SERPL-CCNC: 51 U/L (ref 73–393)
MAGNESIUM SERPL-MCNC: 2.4 MG/DL (ref 1.6–2.3)
O2/TOTAL GAS SETTING VFR VENT: 21 %
OXYHGB MFR BLDV: 52 %
PCO2 BLDV: 23 MM HG (ref 40–50)
PH BLDV: 7.42 PH (ref 7.32–7.43)
PO2 BLDV: 30 MM HG (ref 25–47)
POTASSIUM BLD-SCNC: 2 MMOL/L (ref 3.4–5.3)
POTASSIUM SERPL-SCNC: 4.2 MMOL/L (ref 3.4–5.3)
POTASSIUM SERPL-SCNC: 4.2 MMOL/L (ref 3.4–5.3)
PROT SERPL-MCNC: 7.6 G/DL (ref 6.8–8.8)
SODIUM SERPL-SCNC: 132 MMOL/L (ref 133–144)

## 2018-11-30 PROCEDURE — 80076 HEPATIC FUNCTION PANEL: CPT | Performed by: INTERNAL MEDICINE

## 2018-11-30 PROCEDURE — 85610 PROTHROMBIN TIME: CPT | Performed by: INTERNAL MEDICINE

## 2018-11-30 PROCEDURE — 40000558 ZZH STATISTIC CVC DRESSING CHANGE

## 2018-11-30 PROCEDURE — 82150 ASSAY OF AMYLASE: CPT | Performed by: NURSE PRACTITIONER

## 2018-11-30 PROCEDURE — 80048 BASIC METABOLIC PNL TOTAL CA: CPT | Performed by: INTERNAL MEDICINE

## 2018-11-30 PROCEDURE — 25000132 ZZH RX MED GY IP 250 OP 250 PS 637: Performed by: NURSE PRACTITIONER

## 2018-11-30 PROCEDURE — 82805 BLOOD GASES W/O2 SATURATION: CPT | Performed by: NURSE PRACTITIONER

## 2018-11-30 PROCEDURE — 25000128 H RX IP 250 OP 636: Performed by: NURSE PRACTITIONER

## 2018-11-30 PROCEDURE — 83690 ASSAY OF LIPASE: CPT | Performed by: NURSE PRACTITIONER

## 2018-11-30 PROCEDURE — 83735 ASSAY OF MAGNESIUM: CPT | Performed by: INTERNAL MEDICINE

## 2018-11-30 PROCEDURE — 83605 ASSAY OF LACTIC ACID: CPT | Performed by: INTERNAL MEDICINE

## 2018-11-30 PROCEDURE — 99232 SBSQ HOSP IP/OBS MODERATE 35: CPT | Performed by: NURSE PRACTITIONER

## 2018-11-30 PROCEDURE — 00000146 ZZHCL STATISTIC GLUCOSE BY METER IP

## 2018-11-30 PROCEDURE — 40000802 ZZH SITE CHECK

## 2018-11-30 PROCEDURE — 83735 ASSAY OF MAGNESIUM: CPT | Performed by: NURSE PRACTITIONER

## 2018-11-30 PROCEDURE — 74019 RADEX ABDOMEN 2 VIEWS: CPT

## 2018-11-30 PROCEDURE — 84132 ASSAY OF SERUM POTASSIUM: CPT | Performed by: NURSE PRACTITIONER

## 2018-11-30 PROCEDURE — 80076 HEPATIC FUNCTION PANEL: CPT | Performed by: NURSE PRACTITIONER

## 2018-11-30 PROCEDURE — 80048 BASIC METABOLIC PNL TOTAL CA: CPT | Performed by: NURSE PRACTITIONER

## 2018-11-30 PROCEDURE — 25000128 H RX IP 250 OP 636: Performed by: STUDENT IN AN ORGANIZED HEALTH CARE EDUCATION/TRAINING PROGRAM

## 2018-11-30 PROCEDURE — 21400006 ZZH R&B CCU INTERMEDIATE UMMC

## 2018-11-30 RX ORDER — CALCIUM CARBONATE 500 MG/1
500 TABLET, CHEWABLE ORAL DAILY PRN
Status: DISCONTINUED | OUTPATIENT
Start: 2018-11-30 | End: 2018-12-05

## 2018-11-30 RX ORDER — AMOXICILLIN 250 MG
1 CAPSULE ORAL 2 TIMES DAILY
Status: DISCONTINUED | OUTPATIENT
Start: 2018-11-30 | End: 2018-12-05

## 2018-11-30 RX ORDER — BUMETANIDE 0.25 MG/ML
2 INJECTION INTRAMUSCULAR; INTRAVENOUS ONCE
Status: COMPLETED | OUTPATIENT
Start: 2018-11-30 | End: 2018-11-30

## 2018-11-30 RX ADMIN — POTASSIUM CHLORIDE 40 MEQ: 750 TABLET, EXTENDED RELEASE ORAL at 08:37

## 2018-11-30 RX ADMIN — HYDRALAZINE HYDROCHLORIDE 75 MG: 50 TABLET ORAL at 08:38

## 2018-11-30 RX ADMIN — TRAZODONE HYDROCHLORIDE 100 MG: 100 TABLET ORAL at 22:55

## 2018-11-30 RX ADMIN — SENNOSIDES AND DOCUSATE SODIUM 1 TABLET: 8.6; 5 TABLET ORAL at 20:35

## 2018-11-30 RX ADMIN — CALCIUM CARBONATE (ANTACID) CHEW TAB 500 MG 500 MG: 500 CHEW TAB at 11:39

## 2018-11-30 RX ADMIN — INSULIN ASPART 1 UNITS: 100 INJECTION, SOLUTION INTRAVENOUS; SUBCUTANEOUS at 08:39

## 2018-11-30 RX ADMIN — THIAMINE HCL TAB 100 MG 100 MG: 100 TAB at 08:38

## 2018-11-30 RX ADMIN — ISOSORBIDE DINITRATE 20 MG: 20 TABLET ORAL at 20:35

## 2018-11-30 RX ADMIN — HYDRALAZINE HYDROCHLORIDE 75 MG: 50 TABLET ORAL at 20:35

## 2018-11-30 RX ADMIN — HYDRALAZINE HYDROCHLORIDE 75 MG: 50 TABLET ORAL at 13:33

## 2018-11-30 RX ADMIN — OMEPRAZOLE 20 MG: 20 CAPSULE, DELAYED RELEASE ORAL at 08:38

## 2018-11-30 RX ADMIN — BUMETANIDE 2 MG: 0.25 INJECTION INTRAMUSCULAR; INTRAVENOUS at 10:55

## 2018-11-30 RX ADMIN — ATORVASTATIN CALCIUM 10 MG: 10 TABLET, FILM COATED ORAL at 08:37

## 2018-11-30 RX ADMIN — SODIUM CHLORIDE, PRESERVATIVE FREE 5 ML: 5 INJECTION INTRAVENOUS at 16:58

## 2018-11-30 RX ADMIN — MILRINONE LACTATE 0.12 MCG/KG/MIN: 200 INJECTION, SOLUTION INTRAVENOUS at 23:21

## 2018-11-30 RX ADMIN — ASPIRIN 81 MG: 81 TABLET, COATED ORAL at 08:38

## 2018-11-30 RX ADMIN — ISOSORBIDE DINITRATE 20 MG: 20 TABLET ORAL at 13:33

## 2018-11-30 RX ADMIN — DOCUSATE SODIUM 100 MG: 100 CAPSULE, LIQUID FILLED ORAL at 08:38

## 2018-11-30 RX ADMIN — POTASSIUM CHLORIDE 40 MEQ: 750 TABLET, EXTENDED RELEASE ORAL at 20:35

## 2018-11-30 RX ADMIN — ISOSORBIDE DINITRATE 20 MG: 20 TABLET ORAL at 08:37

## 2018-11-30 ASSESSMENT — ACTIVITIES OF DAILY LIVING (ADL)
ADLS_ACUITY_SCORE: 9

## 2018-11-30 ASSESSMENT — PAIN DESCRIPTION - DESCRIPTORS
DESCRIPTORS: ACHING;INTERMITTENT
DESCRIPTORS: DISCOMFORT;INTERMITTENT
DESCRIPTORS: DISCOMFORT
DESCRIPTORS: ACHING;INTERMITTENT

## 2018-11-30 NOTE — PLAN OF CARE
Problem: Patient Care Overview  Goal: Plan of Care/Patient Progress Review  Outcome: No Change  D: Pt who presents this admission with hypervolemia. Hx of HTN, SHIRLEY, HLD, NICM s/p ICD on 4/17/17 and on home ionotropes.     I/A: Pt alert and oriented x4. Pt sinus tach with HRs 100-110s. Pt on RA with O2 sats >92%. Pt reports constant aching abdominal pain. Team paged about meds available, still awaiting response. Hot packs intermittently on. Rest encouraged. Pt appetite poor. Last BM today. BS this shift 150, sliding scale insulin given 1x. Milrinone gtt continued at 0.125 mcg/kg/min. Evening labs drawn and sent down. K+ this PM 3.5, replacement given. Next check tomorrow morning.      P: Continue diuresis. Continue to monitor and assess pt with every encounter. Notify Cards 2 with any changes or questions.

## 2018-11-30 NOTE — PLAN OF CARE
"Problem: Cardiac Output Decreased (Adult)  Goal: Identify Related Risk Factors and Signs and Symptoms  Related risk factors and signs and symptoms are identified upon initiation of Human Response Clinical Practice Guideline (CPG).   Outcome: No Change  D:Complaining of persistent abdominal pain beneath right and left rib area and mid abdomen.    States,  \"I think it is gas.  My bowels sound like thunder\".  Requested Maalox..   Denies heart burn.  Abdomen is less tender to palpation today.   ALBINA Jo informed of patient's symptoms and patient was seen at bed side.  Denies nausea.  No emesis.   Denies chest pain or shortness of breath.   Lungs are clear bilaterally.   Upper and lower lobes.   Abdomen is distended.   Reports having a small BM.   Laxatives given.  Continue on a Milrinone drip at .125mcg/kg/min:3:1ml/jhr.  Rhythm is 's.  Bp 102/82  MAP 82  RR 20  O2 sat 96% on room air. Temp 97.3  K+ 4.2   AM  Potassium held per ALBINA Jo orders.     A:Moderate abdominal pain.  No other GI complaints.   Resting in bed most of shift with exception of getting up to bathroom.   No acute distress.   Breathing easy with normal O2 sats on room air.  AVSS.   Rhythm is stable.   Condition is stable.     P;Per  ALBINA Jo possibly \"start bumex drip\".   Continue strict I/O.   Up ad moshe as tolerated.  Monitor rhythm, temp ,  Vital signs and O2 sats.  Notify MD with increase in pain,  New GI complaints. Continue current medical plan of care.      "

## 2018-11-30 NOTE — PROGRESS NOTES
[facility]   Cardiology II Service / Advanced Heart Failure  Daily Progress Note  Date of Service: 11/30/2018      Patient: Danielito Chu  MRN: [id number]  Admission Date: 11/28/2018  Hospital Day # 2    Assessment and Plan: Mr. Chu is a 61 year old male with a past medical history including HTN, SHIRLEY, Hyperlipidemia, NICM s/p ICD 4/7/17 on home inotropes. He presents for hypervolemia in prep for LVAD.      Chronic systolic heart failure secondary to unknown etiology, likely viral.   Stage D, NYHA Class III  ACEi/ARB: Hydralazine 75 mg po TID. Isordil 20 mg po TID.   BB None. Milrinone 0.125 mcg/kg/min.   Aldosterone antagonist Aldactone discontinued in setting of hyperkalemia.   SCD prophylaxis ICD  Fluid status: Hypervolemic. Bumex 2 mg IV.   Sleep Apnea Evaluation: SHIRLEY on CPAP.   - Plan for LVAD implant next week as scheduled prior to admission.      RUQ pain. RUQ US negative for acute findings 11/29. EKG with no acute changes.  - Hepatic panel with mild elevation in Tbili 3.5 (2.4).    - VBG with OxyHgb and LA WNL. Diuresis as above.   - Tums prn. Likely hepatocongestion.      Chronic Medical Conditions:  Moderate Mitral Regurgitation. Echo consistent with mild mitral insufficiency 6/15/17.  Hyperlipidemia. Continue statin.    HTN. Continue Hydralazine and Isordil.   DM Type II. Novolog sliding scale insulin.      FEN: 2 gram NA diet   PROPHY:  Ambulation  LINES:  PICC  DISPO:  TBD   CODE STATUS: Full Code.   ================================================================    Interval History/ROS: He complains of abdominal pain with nausea this AM. He denies fever, chills, chest pain, palpitations, BAJWA, cough, vomiting, diarrhea, and LE edema. He complains of abdominal distention. Last BM this AM. He is tolerating limited po and ambulation.     Last 24 hr care team notes reviewed.   ROS:  4 point ROS including Respiratory, CV, GI and , other than that noted in the HPI, is negative.      Medications: Reviewed in EPIC.     Physical Exam:   /82 (BP Location: Left arm)  Pulse 102  Temp 97.3  F (36.3  C) (Oral)  Resp 20  Wt 82 kg (180 lb 12.8 oz)  SpO2 95%  BMI 26.7 kg/m2  GENERAL: Appears alert and oriented times three.   HEENT: Eye symmetrical and free of discharge bilaterally. Mucous membranes moist and without lesions.  NECK: Supple and without lymphadenopathy. JVD to tragus.    CV: RRR, S1S2 present without murmur, rub, or gallop.   RESPIRATORY: Respirations regular, even, and unlabored. Lungs CTA throughout.   GI: Soft and distended with normoactive bowel sounds present in all quadrants. Tenderness throughout, most prominent to RUQ, gaurding, No rebound. No organomegaly.   EXTREMITIES: Trace bilateral peripheral edema. 2+ bilateral pedal pulses.   NEUROLOGIC: Alert and orientated x 3. CN II-XII grossly intact. No focal deficits.   MUSCULOSKELETAL: No joint swelling or tenderness.   SKIN: No jaundice. No rashes or lesions.     Data:  CMP  Recent Labs  Lab 11/30/18  1054 11/30/18  0430 11/29/18  1714 11/29/18  1125 11/29/18  0412 11/28/18  2020 11/28/18  1415 11/26/18  0932   NA  --  132* 134  --  134 132* 134 133   POTASSIUM 2.0* 4.2 3.5 4.7 3.2* 3.6 3.9 4.3   CHLORIDE  --  98 99  --  99 99 99 97   CO2  --  24 27  --  26 26 26 27   ANIONGAP  --  9 8  --  9 7 8 9   GLC  --  119* 141*  --  114* 120* 83 111*   BUN  --  25 25  --  30 30 30 32*   CR  --  1.22 1.15  --  1.14 1.32* 1.29* 1.36*   GFRESTIMATED  --  60* 64  --  65 55* 56* 53*   GFRESTBLACK  --  73 78  --  79 66 68 64   ASHLEE  --  9.6 8.7  --  9.0 8.8 9.0 8.8   MAG  --  2.4* 2.2  --  2.1 2.2  --   --    PROTTOTAL  --  7.6  --   --  7.2  --  7.6 8.0   ALBUMIN  --  3.5  --   --  3.6  --  3.6 3.8   BILITOTAL  --  3.5*  --   --  2.4*  --  2.3* 2.0*   ALKPHOS  --  94  --   --  84  --  86 86   AST  --  17  --   --  14  --  17 17   ALT  --  15  --   --  14  --  16 19     CBC  Recent Labs  Lab 11/29/18  0515 11/28/18  1415   WBC 10.6  9.0   RBC 4.95 5.10   HGB 15.5 16.2   HCT 47.7 48.8   MCV 96 96   MCH 31.3 31.8   MCHC 32.5 33.2   RDW 17.5* 17.5*    192     INR  Recent Labs  Lab 11/30/18  0430   INR 1.40*       Patient seen and discussed with Dr. Rene.      Deysi Mattson [facility]  11/30/2018    I have reviewed today's vital signs, notes, medications, labs and imaging.  I have also seen and examined the patient and agree with the findings and plan as outlined above.  Pt with complaints of abd discomfort.  VSS with Lungs clear and Abd with distension without rebound or pain to deep palpation.  Labs as above with K 2.0. Assessment: Pt with endstage heart failure on milrinone with hypokalemia and abd discomfort.  Plan for further diuresis.  If pt has persistent discomfort will check AXR.  Plan for LVAD next week.     Bennett Rene MD, PhD  Professor, Heart Failure and Cardiac Transplantation  [facility]

## 2018-11-30 NOTE — PLAN OF CARE
Problem: Goal Outcome Summary  Goal: Goal Outcome Summary  RN  1. Pt will be hemodynamically stable.  2. Pt and family will verbalize understanding of plan of care.  3. Pt will remain free of falls  4. Pain will be under control or minimal    Outcome: No change    D: Admitted 11/28 for hypervolemia. Pt has h/o NICM s/p ICD on 4/17/2017, HTN, SHIRLEY, HLD and liver cirrhosis.  I: Monitored vitals and assessed pt status.  Changed: none  Running: Milrinone gtt @ 0.125 mcg/kg/min (3.1 ml/hr)  PRN: none  Vitals: /78  Pulse 102  Temp 97.8  F (36.6  C) (Oral)  Resp 20  Wt 82 kg (180 lb 12.8 oz)  SpO2 97%  BMI 26.7 kg/m2  BMI= Body mass index is 26.7 kg/(m^2).    A: A0x4. VSS. Room air. SR. Afebrile. Pt had abdominal discomfort (heat pain with some relief). Patient sleeping between cares.   P: Will continue to monitor and manage pt per plan of care. Please report any pertinent changes in pt's condition.

## 2018-12-01 LAB
ALBUMIN SERPL-MCNC: 3.3 G/DL (ref 3.4–5)
ALP SERPL-CCNC: 90 U/L (ref 40–150)
ALT SERPL W P-5'-P-CCNC: 15 U/L (ref 0–70)
ANION GAP SERPL CALCULATED.3IONS-SCNC: 9 MMOL/L (ref 3–14)
ANION GAP SERPL CALCULATED.3IONS-SCNC: 9 MMOL/L (ref 3–14)
AST SERPL W P-5'-P-CCNC: 15 U/L (ref 0–45)
BILIRUB DIRECT SERPL-MCNC: 1.8 MG/DL (ref 0–0.2)
BILIRUB SERPL-MCNC: 2.9 MG/DL (ref 0.2–1.3)
BUN SERPL-MCNC: 24 MG/DL (ref 7–30)
BUN SERPL-MCNC: 25 MG/DL (ref 7–30)
CALCIUM SERPL-MCNC: 8.9 MG/DL (ref 8.5–10.1)
CALCIUM SERPL-MCNC: 8.9 MG/DL (ref 8.5–10.1)
CHLORIDE SERPL-SCNC: 98 MMOL/L (ref 94–109)
CHLORIDE SERPL-SCNC: 98 MMOL/L (ref 94–109)
CO2 SERPL-SCNC: 24 MMOL/L (ref 20–32)
CO2 SERPL-SCNC: 25 MMOL/L (ref 20–32)
CREAT SERPL-MCNC: 1.2 MG/DL (ref 0.66–1.25)
CREAT SERPL-MCNC: 1.21 MG/DL (ref 0.66–1.25)
ERYTHROCYTE [DISTWIDTH] IN BLOOD BY AUTOMATED COUNT: 18.1 % (ref 10–15)
GFR SERPL CREATININE-BSD FRML MDRD: 61 ML/MIN/1.7M2
GFR SERPL CREATININE-BSD FRML MDRD: 61 ML/MIN/1.7M2
GLUCOSE BLDC GLUCOMTR-MCNC: 107 MG/DL (ref 70–99)
GLUCOSE BLDC GLUCOMTR-MCNC: 107 MG/DL (ref 70–99)
GLUCOSE BLDC GLUCOMTR-MCNC: 139 MG/DL (ref 70–99)
GLUCOSE SERPL-MCNC: 109 MG/DL (ref 70–99)
GLUCOSE SERPL-MCNC: 142 MG/DL (ref 70–99)
HCT VFR BLD AUTO: 47 % (ref 40–53)
HGB BLD-MCNC: 15.3 G/DL (ref 13.3–17.7)
MAGNESIUM SERPL-MCNC: 2.1 MG/DL (ref 1.6–2.3)
MAGNESIUM SERPL-MCNC: 2.2 MG/DL (ref 1.6–2.3)
MCH RBC QN AUTO: 31.4 PG (ref 26.5–33)
MCHC RBC AUTO-ENTMCNC: 32.6 G/DL (ref 31.5–36.5)
MCV RBC AUTO: 97 FL (ref 78–100)
PLATELET # BLD AUTO: 171 10E9/L (ref 150–450)
POTASSIUM SERPL-SCNC: 3.7 MMOL/L (ref 3.4–5.3)
POTASSIUM SERPL-SCNC: 3.8 MMOL/L (ref 3.4–5.3)
PROT SERPL-MCNC: 7.2 G/DL (ref 6.8–8.8)
RBC # BLD AUTO: 4.87 10E12/L (ref 4.4–5.9)
SODIUM SERPL-SCNC: 131 MMOL/L (ref 133–144)
SODIUM SERPL-SCNC: 131 MMOL/L (ref 133–144)
WBC # BLD AUTO: 9.6 10E9/L (ref 4–11)

## 2018-12-01 PROCEDURE — 25000128 H RX IP 250 OP 636: Performed by: STUDENT IN AN ORGANIZED HEALTH CARE EDUCATION/TRAINING PROGRAM

## 2018-12-01 PROCEDURE — 83735 ASSAY OF MAGNESIUM: CPT | Performed by: NURSE PRACTITIONER

## 2018-12-01 PROCEDURE — 40000802 ZZH SITE CHECK

## 2018-12-01 PROCEDURE — 25000132 ZZH RX MED GY IP 250 OP 250 PS 637: Performed by: NURSE PRACTITIONER

## 2018-12-01 PROCEDURE — 99232 SBSQ HOSP IP/OBS MODERATE 35: CPT | Mod: GC | Performed by: INTERNAL MEDICINE

## 2018-12-01 PROCEDURE — 00000146 ZZHCL STATISTIC GLUCOSE BY METER IP

## 2018-12-01 PROCEDURE — 80048 BASIC METABOLIC PNL TOTAL CA: CPT | Performed by: NURSE PRACTITIONER

## 2018-12-01 PROCEDURE — 21400006 ZZH R&B CCU INTERMEDIATE UMMC

## 2018-12-01 PROCEDURE — 85027 COMPLETE CBC AUTOMATED: CPT | Performed by: NURSE PRACTITIONER

## 2018-12-01 PROCEDURE — 25000128 H RX IP 250 OP 636: Performed by: NURSE PRACTITIONER

## 2018-12-01 RX ORDER — BUMETANIDE 0.25 MG/ML
1 INJECTION INTRAMUSCULAR; INTRAVENOUS ONCE
Status: COMPLETED | OUTPATIENT
Start: 2018-12-01 | End: 2018-12-01

## 2018-12-01 RX ORDER — BUMETANIDE 0.25 MG/ML
2 INJECTION INTRAMUSCULAR; INTRAVENOUS ONCE
Status: COMPLETED | OUTPATIENT
Start: 2018-12-01 | End: 2018-12-01

## 2018-12-01 RX ADMIN — OMEPRAZOLE 20 MG: 20 CAPSULE, DELAYED RELEASE ORAL at 08:34

## 2018-12-01 RX ADMIN — BUMETANIDE 1 MG: 0.25 INJECTION INTRAMUSCULAR; INTRAVENOUS at 14:06

## 2018-12-01 RX ADMIN — SENNOSIDES AND DOCUSATE SODIUM 1 TABLET: 8.6; 5 TABLET ORAL at 08:33

## 2018-12-01 RX ADMIN — POLYETHYLENE GLYCOL 3350 17 G: 17 POWDER, FOR SOLUTION ORAL at 14:04

## 2018-12-01 RX ADMIN — HYDRALAZINE HYDROCHLORIDE 75 MG: 50 TABLET ORAL at 20:05

## 2018-12-01 RX ADMIN — SODIUM CHLORIDE, PRESERVATIVE FREE 5 ML: 5 INJECTION INTRAVENOUS at 18:57

## 2018-12-01 RX ADMIN — ISOSORBIDE DINITRATE 20 MG: 20 TABLET ORAL at 20:05

## 2018-12-01 RX ADMIN — ATORVASTATIN CALCIUM 10 MG: 10 TABLET, FILM COATED ORAL at 08:33

## 2018-12-01 RX ADMIN — POTASSIUM CHLORIDE 20 MEQ: 750 TABLET, EXTENDED RELEASE ORAL at 01:38

## 2018-12-01 RX ADMIN — POTASSIUM CHLORIDE 20 MEQ: 750 TABLET, EXTENDED RELEASE ORAL at 09:39

## 2018-12-01 RX ADMIN — SENNOSIDES AND DOCUSATE SODIUM 1 TABLET: 8.6; 5 TABLET ORAL at 20:05

## 2018-12-01 RX ADMIN — HYDRALAZINE HYDROCHLORIDE 75 MG: 50 TABLET ORAL at 13:58

## 2018-12-01 RX ADMIN — ISOSORBIDE DINITRATE 20 MG: 20 TABLET ORAL at 13:59

## 2018-12-01 RX ADMIN — THIAMINE HCL TAB 100 MG 100 MG: 100 TAB at 08:33

## 2018-12-01 RX ADMIN — BUMETANIDE 1 MG: 0.25 INJECTION INTRAMUSCULAR; INTRAVENOUS at 11:33

## 2018-12-01 RX ADMIN — ISOSORBIDE DINITRATE 20 MG: 20 TABLET ORAL at 08:34

## 2018-12-01 RX ADMIN — ASPIRIN 81 MG: 81 TABLET, COATED ORAL at 08:33

## 2018-12-01 RX ADMIN — POTASSIUM CHLORIDE 40 MEQ: 750 TABLET, EXTENDED RELEASE ORAL at 08:33

## 2018-12-01 RX ADMIN — HYDRALAZINE HYDROCHLORIDE 75 MG: 50 TABLET ORAL at 08:34

## 2018-12-01 RX ADMIN — POTASSIUM CHLORIDE 40 MEQ: 750 TABLET, EXTENDED RELEASE ORAL at 20:05

## 2018-12-01 RX ADMIN — BUMETANIDE 2 MG: 0.25 INJECTION INTRAMUSCULAR; INTRAVENOUS at 01:37

## 2018-12-01 RX ADMIN — BUMETANIDE 1 MG: 0.25 INJECTION INTRAMUSCULAR; INTRAVENOUS at 18:57

## 2018-12-01 ASSESSMENT — ACTIVITIES OF DAILY LIVING (ADL)
ADLS_ACUITY_SCORE: 11
ADLS_ACUITY_SCORE: 11
ADLS_ACUITY_SCORE: 9
ADLS_ACUITY_SCORE: 9
ADLS_ACUITY_SCORE: 11
ADLS_ACUITY_SCORE: 9

## 2018-12-01 NOTE — PLAN OF CARE
Problem: Patient Care Overview  Goal: Plan of Care/Patient Progress Review  1. Pt will be hemodynamically stable.  2. Pt and family will verbalize understanding of plan of care.  3. Pt will remain free of falls  4. Pain will be under control or minimal   Outcome: No Change  Shift summary [id number]    Pt is here for LVAD placement which is scheduled for Wednesday. Pt presented early d/t fluid overload. Pt currently receiving diuretics t  Improve volume status. Pt c/o abdominal pain felt to be d/t hepatic congestion as CT of chest/abdomen normal and abdominal xray unremarkable. Pt continues on Milrinone gtt at .125 mg/kg/min through DL PICC. Pt has no LE edema  And lungs clear. Pt's abdomin slightly distended and bowel sounds hypoactive. Pt encouraged to ambulate but declined. Pt very anxious about up coming surgery. Pt requested ananda lax but when asked how he would like it pt declined. Pt also noted to want to take medications when he would like vs when scheduled which is concerning with up coming LVAD surgery and future compliance. Will leave note for primary team to discuss with pt.Pt's FSBG WNL, appetite poor. POC: pt to have LVAD placed on Wednesday, continue diuretics and monitoring, update MD's with concerns or questions.     Problem: Cardiac: Heart Failure (Adult)  Goal: Signs and Symptoms of Listed Potential Problems Will be Absent, Minimized or Managed (Cardiac: Heart Failure)  Signs and symptoms of listed potential problems will be absent, minimized or managed by discharge/transition of care (reference Cardiac: Heart Failure (Adult) CPG).  Outcome: No Change   11/30/18 1905   Cardiac: Heart Failure   Problems Assessed (Heart Failure) all   Problems Present (Heart Failure) cardiac pump dysfunction;decreased quality of life;fluid/electrolyte imbalance;functional decline/self-care deficit;situational response

## 2018-12-01 NOTE — PLAN OF CARE
Problem: Patient Care Overview  Goal: Plan of Care/Patient Progress Review  1. Pt will be hemodynamically stable.  2. Pt and family will verbalize understanding of plan of care.  3. Pt will remain free of falls  4. Pain will be under control or minimal   Outcome: No Change  D: Pt admitted 11/28 with volume overload. LVAD placement planned for Weds 12/5.  I: Monitored vitals and assessed pt status. Milrinone gtt at 0.125 mcg/kg/min. One-time bumex 2mg K=3.7 overnight (20mEq given for replacement per protocol).  A: A0x4. VSS on RA. ST on tele. Pt declined interventions for upper abd pain at start of shift; reported improved pain level overnight. Voiding adequately in urinal. Up ind in room. Pt appeared to sleep comfortably between cares, making needs known.  P: Continue to monitor and report changes/concerns to Cards 2.

## 2018-12-01 NOTE — PLAN OF CARE
"Problem: Cardiac: Heart Failure (Adult)  Goal: Signs and Symptoms of Listed Potential Problems Will be Absent, Minimized or Managed (Cardiac: Heart Failure)  Signs and symptoms of listed potential problems will be absent, minimized or managed by discharge/transition of care (reference Cardiac: Heart Failure (Adult) CPG).   Outcome: Improving  D:Reports having \"zero\" pain.   Up out of bed at onset of shift.   Reports feeling \"better\".   No chest pain.  No shortness of breath.  Lungs have crackles in the lower lobes bilaterally.   Has incentive spirometer at bedside and was instructed to use q 1-2 hours.   No edema.  Up in chair for meals.   Reported walking \"over to neurology unit\" and feeling \"slightly fatigued\".  Rhythm is ST HR low 100's.   Bp106/86 MAP 90,  O2 sat 97% on  RA., Temp 97.6.      A:Looking better.   Out of bed  Most of shift.   Rhythm is unchanged.   Stable.   AVSS.   Breathing easy with normal sats on room air.  Condition is improved.    PContinue to assess level of comfort.   Monitor rhythm and vital signs and O2 sats.   Diuretics as ordered.  Up ad moshe.       "

## 2018-12-01 NOTE — PROGRESS NOTES
Cardiology Progress Note  Danielito Chu MRN: [id number]  Age: 63 year old, : 1955  Date: 2018          CHANGES TODAY:     - bumex 1 mg IV bid         Assessment and Plan:     61 year old male with a past medical history including HTN, SHIRLEY, Hyperlipidemia, NICM s/p ICD 17 on home inotropes. He presents for hypervolemia in prep for LVAD.       # Chronic systolic heart failure secondary to unknown etiology, likely viral  - Stage D, NYHA Class III  - ACEi/ARB: Hydralazine 75 mg po TID. Isordil 20 mg po TID.   - BB None. Milrinone 0.125 mcg/kg/min  - Aldosterone antagonist Aldactone discontinued in setting of hyperkalemia  - SCD prophylaxis ICD  - Fluid status: Hypervolemic. Bumex 1 mg IV bid today  - Sleep Apnea Evaluation: SHIRLEY on CPAP  - Plan for LVAD implant next week as scheduled prior to admission      # RUQ pain, resolved  RUQ US negative for acute findings . EKG with no acute changes.  Now resolved.  Likely from congestive hepatopathy.  Patient also has chronically elevated T bili and direct bili.      Chronic Medical Conditions:  # Moderate Mitral Regurgitation. Echo consistent with mild mitral insufficiency 6/15/17.  # Hyperlipidemia. Continue statin.    # HTN. Continue Hydralazine and Isordil.   # DM Type II. Novolog sliding scale insulin.     FEN: 2 gram NA diet   PROPHY:  Ambulation  LINES:  PICC  DISPO:  TBD   CODE STATUS: Full Code.      Patient was discussed with staff attending, Dr. Rene, who agrees with the above assessment and plan.    Mary Esposito MD  PGY-2 Internal Medicine  Pager [phone]            Subjective     No acute event overnight.  Reported that abdominal pain resolved.  Had a concern about upcoming LVAD procedure.  All questions answered.          Objective     Vitals:  Temp:  [97.4  F (36.3  C)-98.2  F (36.8  C)] 97.5  F (36.4  C)  Pulse:  [110] 110  Heart Rate:  [106-112] 111  Resp:  [18] 18  BP: ()/(72-86)  102/73  SpO2:  [95 %-100 %] 95 %    I/O:    Intake/Output Summary (Last 24 hours) at 12/01/18 1810  Last data filed at 12/01/18 1459   Gross per 24 hour   Intake           197.28 ml   Output             1575 ml   Net         -1377.72 ml       Vitals:    11/29/18 0200 11/30/18 0423 12/01/18 0600   Weight: 82.1 kg (180 lb 14.4 oz) 82 kg (180 lb 12.8 oz) 81.6 kg (180 lb)       Gen: no acute distress  HEENT: NCAT, anicteric sclera  NECK: JVP 12 cm  PULM/THORAX: Clear to auscultation bilaterally, no rales/stridor/wheezes  CV: tachycardic, RRR, S1 and S2 appreciated, no extra heart sounds, murmurs or rub auscultated. No JVD  ABD: obese, soft, nontender, nondistended. Normoactive bowel sounds x 4.  EXT: trace edema, clubbing or cyanosis.     Line: PICC          Data:     LABS reviewed.        Medications     Medications    aspirin  81 mg Oral Daily     atorvastatin  10 mg Oral Daily     heparin lock flush  5-10 mL Intracatheter Q24H     hydrALAZINE  75 mg Oral TID     insulin aspart  1-7 Units Subcutaneous TID AC     insulin aspart  1-5 Units Subcutaneous At Bedtime     isosorbide dinitrate  20 mg Oral TID     omeprazole  20 mg Oral QAM AC     potassium chloride ER  40 mEq Oral BID     senna-docusate  1 tablet Oral BID     vitamin B1  100 mg Oral Daily       milrinone 0.125 mcg/kg/min (12/01/18 1459)                I have reviewed today's vital signs, notes, medications, labs and imaging.  I have also seen and examined the patient and agree with the findings and plan as outlined above.  Pt feeling better with less abd discomfort.  VSS with lungs clear and abd benign.  Labs as above.  Assessment: Pt with endstage heart failure on milrinone therapy awaiting LVAD placement.     Bennett Rene MD, PhD  Professor, Heart Failure and Cardiac Transplantation  [facility]

## 2018-12-02 DIAGNOSIS — I42.8 NONISCHEMIC CARDIOMYOPATHY (H): Primary | ICD-10-CM

## 2018-12-02 DIAGNOSIS — I42.8 OTHER CARDIOMYOPATHIES (H): Primary | ICD-10-CM

## 2018-12-02 LAB
ANION GAP SERPL CALCULATED.3IONS-SCNC: 8 MMOL/L (ref 3–14)
ANION GAP SERPL CALCULATED.3IONS-SCNC: 9 MMOL/L (ref 3–14)
BUN SERPL-MCNC: 29 MG/DL (ref 7–30)
BUN SERPL-MCNC: 30 MG/DL (ref 7–30)
CALCIUM SERPL-MCNC: 9 MG/DL (ref 8.5–10.1)
CALCIUM SERPL-MCNC: 9 MG/DL (ref 8.5–10.1)
CHLORIDE SERPL-SCNC: 98 MMOL/L (ref 94–109)
CHLORIDE SERPL-SCNC: 98 MMOL/L (ref 94–109)
CO2 SERPL-SCNC: 25 MMOL/L (ref 20–32)
CO2 SERPL-SCNC: 25 MMOL/L (ref 20–32)
CREAT SERPL-MCNC: 1.32 MG/DL (ref 0.66–1.25)
CREAT SERPL-MCNC: 1.35 MG/DL (ref 0.66–1.25)
GFR SERPL CREATININE-BSD FRML MDRD: 53 ML/MIN/1.7M2
GFR SERPL CREATININE-BSD FRML MDRD: 55 ML/MIN/1.7M2
GLUCOSE BLDC GLUCOMTR-MCNC: 122 MG/DL (ref 70–99)
GLUCOSE BLDC GLUCOMTR-MCNC: 135 MG/DL (ref 70–99)
GLUCOSE BLDC GLUCOMTR-MCNC: 158 MG/DL (ref 70–99)
GLUCOSE BLDC GLUCOMTR-MCNC: 85 MG/DL (ref 70–99)
GLUCOSE SERPL-MCNC: 100 MG/DL (ref 70–99)
GLUCOSE SERPL-MCNC: 80 MG/DL (ref 70–99)
MAGNESIUM SERPL-MCNC: 2.2 MG/DL (ref 1.6–2.3)
POTASSIUM SERPL-SCNC: 3.9 MMOL/L (ref 3.4–5.3)
POTASSIUM SERPL-SCNC: 4 MMOL/L (ref 3.4–5.3)
SODIUM SERPL-SCNC: 132 MMOL/L (ref 133–144)
SODIUM SERPL-SCNC: 133 MMOL/L (ref 133–144)

## 2018-12-02 PROCEDURE — 40000275 ZZH STATISTIC RCP TIME EA 10 MIN

## 2018-12-02 PROCEDURE — 00000146 ZZHCL STATISTIC GLUCOSE BY METER IP

## 2018-12-02 PROCEDURE — 25000128 H RX IP 250 OP 636: Performed by: NURSE PRACTITIONER

## 2018-12-02 PROCEDURE — 99232 SBSQ HOSP IP/OBS MODERATE 35: CPT | Performed by: NURSE PRACTITIONER

## 2018-12-02 PROCEDURE — 21400006 ZZH R&B CCU INTERMEDIATE UMMC

## 2018-12-02 PROCEDURE — 25000132 ZZH RX MED GY IP 250 OP 250 PS 637: Performed by: NURSE PRACTITIONER

## 2018-12-02 PROCEDURE — 25000132 ZZH RX MED GY IP 250 OP 250 PS 637: Performed by: STUDENT IN AN ORGANIZED HEALTH CARE EDUCATION/TRAINING PROGRAM

## 2018-12-02 PROCEDURE — 25000128 H RX IP 250 OP 636: Performed by: STUDENT IN AN ORGANIZED HEALTH CARE EDUCATION/TRAINING PROGRAM

## 2018-12-02 PROCEDURE — 83735 ASSAY OF MAGNESIUM: CPT | Performed by: NURSE PRACTITIONER

## 2018-12-02 PROCEDURE — 80048 BASIC METABOLIC PNL TOTAL CA: CPT | Performed by: NURSE PRACTITIONER

## 2018-12-02 PROCEDURE — 94660 CPAP INITIATION&MGMT: CPT

## 2018-12-02 RX ORDER — ACETAMINOPHEN 325 MG/1
650 TABLET ORAL
Status: COMPLETED | OUTPATIENT
Start: 2018-12-02 | End: 2018-12-02

## 2018-12-02 RX ORDER — ACETAMINOPHEN 325 MG/1
650 TABLET ORAL EVERY 4 HOURS PRN
Status: DISCONTINUED | OUTPATIENT
Start: 2018-12-02 | End: 2018-12-05

## 2018-12-02 RX ORDER — BUMETANIDE 0.25 MG/ML
2 INJECTION INTRAMUSCULAR; INTRAVENOUS ONCE
Status: COMPLETED | OUTPATIENT
Start: 2018-12-02 | End: 2018-12-02

## 2018-12-02 RX ADMIN — Medication 5 ML: at 05:33

## 2018-12-02 RX ADMIN — POTASSIUM CHLORIDE 40 MEQ: 750 TABLET, EXTENDED RELEASE ORAL at 09:05

## 2018-12-02 RX ADMIN — ISOSORBIDE DINITRATE 20 MG: 20 TABLET ORAL at 20:25

## 2018-12-02 RX ADMIN — ISOSORBIDE DINITRATE 20 MG: 20 TABLET ORAL at 09:05

## 2018-12-02 RX ADMIN — SODIUM CHLORIDE, PRESERVATIVE FREE 5 ML: 5 INJECTION INTRAVENOUS at 17:10

## 2018-12-02 RX ADMIN — POTASSIUM CHLORIDE 20 MEQ: 750 TABLET, EXTENDED RELEASE ORAL at 23:48

## 2018-12-02 RX ADMIN — BUMETANIDE 2 MG: 0.25 INJECTION INTRAMUSCULAR; INTRAVENOUS at 09:05

## 2018-12-02 RX ADMIN — HYDRALAZINE HYDROCHLORIDE 75 MG: 50 TABLET ORAL at 13:03

## 2018-12-02 RX ADMIN — HYDRALAZINE HYDROCHLORIDE 75 MG: 50 TABLET ORAL at 20:25

## 2018-12-02 RX ADMIN — ISOSORBIDE DINITRATE 20 MG: 20 TABLET ORAL at 13:03

## 2018-12-02 RX ADMIN — MILRINONE LACTATE 0.12 MCG/KG/MIN: 200 INJECTION, SOLUTION INTRAVENOUS at 05:42

## 2018-12-02 RX ADMIN — ACETAMINOPHEN 650 MG: 325 TABLET, FILM COATED ORAL at 17:09

## 2018-12-02 RX ADMIN — THIAMINE HCL TAB 100 MG 100 MG: 100 TAB at 09:05

## 2018-12-02 RX ADMIN — OMEPRAZOLE 20 MG: 20 CAPSULE, DELAYED RELEASE ORAL at 09:06

## 2018-12-02 RX ADMIN — ASPIRIN 81 MG: 81 TABLET, COATED ORAL at 09:05

## 2018-12-02 RX ADMIN — MILRINONE LACTATE 0.12 MCG/KG/MIN: 200 INJECTION, SOLUTION INTRAVENOUS at 15:42

## 2018-12-02 RX ADMIN — HYDRALAZINE HYDROCHLORIDE 75 MG: 50 TABLET ORAL at 09:05

## 2018-12-02 RX ADMIN — ACETAMINOPHEN 650 MG: 325 TABLET, FILM COATED ORAL at 13:03

## 2018-12-02 RX ADMIN — TRAZODONE HYDROCHLORIDE 100 MG: 100 TABLET ORAL at 23:49

## 2018-12-02 RX ADMIN — ATORVASTATIN CALCIUM 10 MG: 10 TABLET, FILM COATED ORAL at 09:06

## 2018-12-02 RX ADMIN — SALINE NASAL SPRAY 1 SPRAY: 1.5 SOLUTION NASAL at 15:40

## 2018-12-02 RX ADMIN — POTASSIUM CHLORIDE 40 MEQ: 750 TABLET, EXTENDED RELEASE ORAL at 20:26

## 2018-12-02 RX ADMIN — BUMETANIDE 2 MG: 0.25 INJECTION INTRAMUSCULAR; INTRAVENOUS at 15:41

## 2018-12-02 RX ADMIN — SENNOSIDES AND DOCUSATE SODIUM 1 TABLET: 8.6; 5 TABLET ORAL at 20:25

## 2018-12-02 RX ADMIN — SENNOSIDES AND DOCUSATE SODIUM 1 TABLET: 8.6; 5 TABLET ORAL at 09:06

## 2018-12-02 ASSESSMENT — ACTIVITIES OF DAILY LIVING (ADL)
ADLS_ACUITY_SCORE: 11

## 2018-12-02 NOTE — PLAN OF CARE
Problem: Patient Care Overview  Goal: Plan of Care/Patient Progress Review  1. Pt will be hemodynamically stable.  2. Pt and family will verbalize understanding of plan of care.  3. Pt will remain free of falls  4. Pain will be under control or minimal   Outcome: No Change  Neuro:, irritable at start of night, now has apologized. Discussed with him his fear of getting a LVAD, feeling he has no choice to do it at this time.   Cardiac: SR. BP 91 /70 at start of night. Now 114/84.   Respiratory: Sating 95% on room air.  GI/: Adequate urine output. Abdominal discomfort due to ascites.  Diet/appetite: Tolerating 2 gm Na diet .  Activity:  Up independently.  Pain: At acceptable level on current regimen.   Skin: No new deficits noted.  LDA's: DL PICC with Milrinone 0.125 mcg/kg/ min.    Plan: Continue with POC. Notify primary team with changes.

## 2018-12-02 NOTE — PROGRESS NOTES
[facility]   Cardiology II Service / Advanced Heart Failure  Daily Progress Note  Date of Service: 12/2/2018      Patient: Danielito Chu  MRN: [id number]  Admission Date: 11/28/2018  Hospital Day # 4    Assessment and Plan: Mr. Chu is a 61 year old male with a past medical history including HTN, SHIRLEY, Hyperlipidemia, NICM s/p ICD 4/7/17 on home inotropes. He presents for hypervolemia in prep for LVAD.       Chronic systolic heart failure secondary to unknown etiology, likely viral.   Stage D, NYHA Class III  ACEi/ARB: Hydralazine 75 mg po TID. Isordil 20 mg po TID.   BB None. Milrinone 0.125 mcg/kg/min.   Aldosterone antagonist Aldactone discontinued in setting of hyperkalemia.   SCD prophylaxis ICD  Fluid status: Hypervolemic. Bumex 2 mg IV.   Sleep Apnea Evaluation: SHIRLEY on CPAP.   - Plan for LVAD implant next week as scheduled prior to admission.       Chronic/Resolved Medical Conditions:  RUQ pain, resolved. Likely hepatocongestion, resolved with diuresis. RUQ US negative for acute findings 11/29. EKG with no acute changes. Abd X-ray negative. VBG with OxyHgb and LA WNL. Hepatic panel with mild elevation in Tbili 3.5 (2.4).    Moderate Mitral Regurgitation. Echo consistent with mild mitral insufficiency 6/15/17.  Hyperlipidemia. Continue statin.    HTN. Continue Hydralazine and Isordil.   DM Type II. Novolog sliding scale insulin.       FEN: 2 gram NA diet   PROPHY:  Ambulation  LINES:  PICC  DISPO:  TBD   CODE STATUS: Full Code.   ================================================================  Interval History/ROS: He notes mild headache this AM, improved with ice pack. He denies fever, chills, chest pain, palpitations, BAJWA, PND, cough, nausea, vomiting, diarrhea, LE edema, and abdominal pain. He is tolerating po and ambulation today.     Last 24 hr care team notes reviewed.   ROS:  4 point ROS including Respiratory, CV, GI and , other than that noted in the HPI, is negative.      Medications: Reviewed in EPIC.     Physical Exam:   /80 (BP Location: Right arm)  Pulse 110  Temp 97.5  F (36.4  C) (Oral)  Resp 16  Wt 81.7 kg (180 lb 1.6 oz)  SpO2 98%  BMI 26.6 kg/m2  GENERAL: Appears alert and oriented times three.   HEENT: Eye symmetrical and free of discharge bilaterally. Mucous membranes moist and without lesions.  NECK: Supple and without lymphadenopathy. JVD to jaw.   CV: RRR, S1S2 present with Grade III/VI murmur heard best at LSB.   RESPIRATORY: Respirations regular, even, and unlabored. Lungs CTA throughout.   GI: Soft and distended with normoactive bowel sounds present in all quadrants. No tenderness, rebound, guarding. No organomegaly.   EXTREMITIES: +1 bilateral peripheral edema. 2+ bilateral pedal pulses.   NEUROLOGIC: Alert and orientated x 3. CN II-XII grossly intact. No focal deficits.   MUSCULOSKELETAL: No joint swelling or tenderness.   SKIN: No jaundice. No rashes or lesions.     Data:  CMP  Recent Labs  Lab 12/02/18  0536 12/01/18  0555 11/30/18  2349 11/30/18  1130  11/30/18  0430 11/29/18  1714  11/29/18  0412  11/28/18  1415   * 131* 131*  --   --  132* 134  --  134  < > 134   POTASSIUM 3.9 3.8 3.7 4.2  < > 4.2 3.5  < > 3.2*  < > 3.9   CHLORIDE 98 98 98  --   --  98 99  --  99  < > 99   CO2 25 25 24  --   --  24 27  --  26  < > 26   ANIONGAP 8 9 9  --   --  9 8  --  9  < > 8   * 109* 142*  --   --  119* 141*  --  114*  < > 83   BUN 29 24 25  --   --  25 25  --  30  < > 30   CR 1.35* 1.20 1.21  --   --  1.22 1.15  --  1.14  < > 1.29*   GFRESTIMATED 53* 61 61  --   --  60* 64  --  65  < > 56*   GFRESTBLACK 65 74 73  --   --  73 78  --  79  < > 68   ASHLEE 9.0 8.9 8.9  --   --  9.6 8.7  --  9.0  < > 9.0   MAG  --  2.1 2.2  --   --  2.4* 2.2  --  2.1  < >  --    PROTTOTAL  --   --  7.2  --   --  7.6  --   --  7.2  --  7.6   ALBUMIN  --   --  3.3*  --   --  3.5  --   --  3.6  --  3.6   BILITOTAL  --   --  2.9*  --   --  3.5*  --   --  2.4*  --  2.3*    ALKPHOS  --   --  90  --   --  94  --   --  84  --  86   AST  --   --  15  --   --  17  --   --  14  --  17   ALT  --   --  15  --   --  15  --   --  14  --  16   < > = values in this interval not displayed.  CBC  Recent Labs  Lab 12/01/18  0555 11/29/18  0515 11/28/18  1415   WBC 9.6 10.6 9.0   RBC 4.87 4.95 5.10   HGB 15.3 15.5 16.2   HCT 47.0 47.7 48.8   MCV 97 96 96   MCH 31.4 31.3 31.8   MCHC 32.6 32.5 33.2   RDW 18.1* 17.5* 17.5*    187 192     INR  Recent Labs  Lab 11/30/18  0430   INR 1.40*       Patient discussed with Dr. Rene.      Deysi Mattson [facility]  12/2/2018

## 2018-12-02 NOTE — PLAN OF CARE
Problem: Patient Care Overview  Goal: Plan of Care/Patient Progress Review  1. Pt will be hemodynamically stable.  2. Pt and family will verbalize understanding of plan of care.  3. Pt will remain free of falls  4. Pain will be under control or minimal   D: patient with hx of HF on home milrinone here awaiting LVAD implant planned for 12/5.   I: Milrinone drip continues at 0.125 mcg/kg/min. Tylenol given x1 for headache/sinus dryness. Saline nasal spray ordered for sinus dryness.  A: A/Ox4, pleasant and cooperative. Denies pain but endorses head ache/sinus dryness, reports feeling foggy. Up independently, voids in urinal.   P: Continue with current plan of care, notify cards 2 with questions/concerns. LVAD placement 12/5.

## 2018-12-02 NOTE — PROGRESS NOTES
At midnight pt stated I am not to bother him until 0600. Midnight  BP 91/60 with MAP of 79. 0200 blood sugar not done. MD aware.

## 2018-12-02 NOTE — PLAN OF CARE
Problem: Patient Care Overview  Goal: Plan of Care/Patient Progress Review  1. Pt will be hemodynamically stable.  2. Pt and family will verbalize understanding of plan of care.  3. Pt will remain free of falls  4. Pain will be under control or minimal   Outcome: No Change  Shift summary [id number]    Pt currently awaiting LVAD placement scheduled for Wednesday. Pt currently hypervolemic and receiving IV diuretics. Pt has no LE edema or crackles in his lungs but does have slightly distended abdomin. Pt had been c/o abdominal pain yesterday,felt to be d/t hepatic congestion but that has resolved today. Pt has been up ambulating in dixon and using his IS. Pt showered tonight.Pt continues on his milrinone gtt at .125 mq / kg /min. Pt has been in SR 85-90 with 0-11 PVC's. Pt had BM this evening. Pt voiding in fair amounts. POC continue current medications and cares, anticipate LVAD placement on Wednesday.    ADD: writer found isordil in a med cup on pt bedside table around 8 pm likely 2 pm dose. Instructed pt that he needed to take medications when he is given them    Problem: Cardiac: Heart Failure (Adult)  Goal: Signs and Symptoms of Listed Potential Problems Will be Absent, Minimized or Managed (Cardiac: Heart Failure)  Signs and symptoms of listed potential problems will be absent, minimized or managed by discharge/transition of care (reference Cardiac: Heart Failure (Adult) CPG).   Outcome: No Change   12/01/18 [id number]   Cardiac: Heart Failure   Problems Assessed (Heart Failure) all   Problems Present (Heart Failure) cardiac pump dysfunction;decreased quality of life;fluid/electrolyte imbalance;situational response

## 2018-12-03 LAB
ALBUMIN SERPL-MCNC: 3.2 G/DL (ref 3.4–5)
ALP SERPL-CCNC: 99 U/L (ref 40–150)
ALT SERPL W P-5'-P-CCNC: 14 U/L (ref 0–70)
ANION GAP SERPL CALCULATED.3IONS-SCNC: 10 MMOL/L (ref 3–14)
ANION GAP SERPL CALCULATED.3IONS-SCNC: 10 MMOL/L (ref 3–14)
AST SERPL W P-5'-P-CCNC: 12 U/L (ref 0–45)
BILIRUB DIRECT SERPL-MCNC: 1.1 MG/DL (ref 0–0.2)
BILIRUB SERPL-MCNC: 2.2 MG/DL (ref 0.2–1.3)
BUN SERPL-MCNC: 28 MG/DL (ref 7–30)
BUN SERPL-MCNC: 30 MG/DL (ref 7–30)
CALCIUM SERPL-MCNC: 8.7 MG/DL (ref 8.5–10.1)
CALCIUM SERPL-MCNC: 9.1 MG/DL (ref 8.5–10.1)
CHLORIDE SERPL-SCNC: 100 MMOL/L (ref 94–109)
CHLORIDE SERPL-SCNC: 100 MMOL/L (ref 94–109)
CO2 SERPL-SCNC: 24 MMOL/L (ref 20–32)
CO2 SERPL-SCNC: 24 MMOL/L (ref 20–32)
CREAT SERPL-MCNC: 1.21 MG/DL (ref 0.66–1.25)
CREAT SERPL-MCNC: 1.35 MG/DL (ref 0.66–1.25)
ERYTHROCYTE [DISTWIDTH] IN BLOOD BY AUTOMATED COUNT: 18.1 % (ref 10–15)
GFR SERPL CREATININE-BSD FRML MDRD: 53 ML/MIN/1.7M2
GFR SERPL CREATININE-BSD FRML MDRD: 61 ML/MIN/1.7M2
GLUCOSE BLDC GLUCOMTR-MCNC: 107 MG/DL (ref 70–99)
GLUCOSE BLDC GLUCOMTR-MCNC: 110 MG/DL (ref 70–99)
GLUCOSE BLDC GLUCOMTR-MCNC: 116 MG/DL (ref 70–99)
GLUCOSE BLDC GLUCOMTR-MCNC: 156 MG/DL (ref 70–99)
GLUCOSE BLDC GLUCOMTR-MCNC: 160 MG/DL (ref 70–99)
GLUCOSE SERPL-MCNC: 106 MG/DL (ref 70–99)
GLUCOSE SERPL-MCNC: 144 MG/DL (ref 70–99)
HCT VFR BLD AUTO: 45.2 % (ref 40–53)
HGB BLD-MCNC: 14.6 G/DL (ref 13.3–17.7)
MAGNESIUM SERPL-MCNC: 2.1 MG/DL (ref 1.6–2.3)
MAGNESIUM SERPL-MCNC: 2.3 MG/DL (ref 1.6–2.3)
MCH RBC QN AUTO: 31.2 PG (ref 26.5–33)
MCHC RBC AUTO-ENTMCNC: 32.3 G/DL (ref 31.5–36.5)
MCV RBC AUTO: 97 FL (ref 78–100)
PLATELET # BLD AUTO: 180 10E9/L (ref 150–450)
POTASSIUM SERPL-SCNC: 3.6 MMOL/L (ref 3.4–5.3)
POTASSIUM SERPL-SCNC: 4.2 MMOL/L (ref 3.4–5.3)
PROT SERPL-MCNC: 7.1 G/DL (ref 6.8–8.8)
RBC # BLD AUTO: 4.68 10E12/L (ref 4.4–5.9)
SODIUM SERPL-SCNC: 134 MMOL/L (ref 133–144)
SODIUM SERPL-SCNC: 134 MMOL/L (ref 133–144)
WBC # BLD AUTO: 8.5 10E9/L (ref 4–11)

## 2018-12-03 PROCEDURE — 25000128 H RX IP 250 OP 636: Performed by: STUDENT IN AN ORGANIZED HEALTH CARE EDUCATION/TRAINING PROGRAM

## 2018-12-03 PROCEDURE — 25000128 H RX IP 250 OP 636: Performed by: NURSE PRACTITIONER

## 2018-12-03 PROCEDURE — 25000125 ZZHC RX 250: Performed by: NURSE PRACTITIONER

## 2018-12-03 PROCEDURE — 85027 COMPLETE CBC AUTOMATED: CPT | Performed by: NURSE PRACTITIONER

## 2018-12-03 PROCEDURE — 21400006 ZZH R&B CCU INTERMEDIATE UMMC

## 2018-12-03 PROCEDURE — 40000141 ZZH STATISTIC PERIPHERAL IV START W/O US GUIDANCE

## 2018-12-03 PROCEDURE — 94660 CPAP INITIATION&MGMT: CPT

## 2018-12-03 PROCEDURE — 00000146 ZZHCL STATISTIC GLUCOSE BY METER IP

## 2018-12-03 PROCEDURE — 25000132 ZZH RX MED GY IP 250 OP 250 PS 637: Performed by: NURSE PRACTITIONER

## 2018-12-03 PROCEDURE — 80048 BASIC METABOLIC PNL TOTAL CA: CPT | Performed by: NURSE PRACTITIONER

## 2018-12-03 PROCEDURE — 99233 SBSQ HOSP IP/OBS HIGH 50: CPT | Performed by: NURSE PRACTITIONER

## 2018-12-03 PROCEDURE — 80076 HEPATIC FUNCTION PANEL: CPT | Performed by: NURSE PRACTITIONER

## 2018-12-03 PROCEDURE — 83735 ASSAY OF MAGNESIUM: CPT | Performed by: NURSE PRACTITIONER

## 2018-12-03 RX ORDER — FLUCONAZOLE 2 MG/ML
200 INJECTION, SOLUTION INTRAVENOUS
Status: CANCELLED | OUTPATIENT
Start: 2018-12-03

## 2018-12-03 RX ORDER — FUROSEMIDE 10 MG/ML
40 INJECTION INTRAMUSCULAR; INTRAVENOUS ONCE
Status: COMPLETED | OUTPATIENT
Start: 2018-12-03 | End: 2018-12-03

## 2018-12-03 RX ORDER — MUPIROCIN 20 MG/G
1 OINTMENT TOPICAL 2 TIMES DAILY
Status: COMPLETED | OUTPATIENT
Start: 2018-12-04 | End: 2018-12-04

## 2018-12-03 RX ORDER — LEVOFLOXACIN 5 MG/ML
500 INJECTION, SOLUTION INTRAVENOUS
Status: CANCELLED | OUTPATIENT
Start: 2018-12-03

## 2018-12-03 RX ORDER — LEVOFLOXACIN 5 MG/ML
500 INJECTION, SOLUTION INTRAVENOUS SEE ADMIN INSTRUCTIONS
Status: CANCELLED | OUTPATIENT
Start: 2018-12-03

## 2018-12-03 RX ADMIN — MILRINONE LACTATE 0.12 MCG/KG/MIN: 200 INJECTION, SOLUTION INTRAVENOUS at 20:42

## 2018-12-03 RX ADMIN — HYDRALAZINE HYDROCHLORIDE 75 MG: 50 TABLET ORAL at 14:21

## 2018-12-03 RX ADMIN — POTASSIUM CHLORIDE 40 MEQ: 750 TABLET, EXTENDED RELEASE ORAL at 10:29

## 2018-12-03 RX ADMIN — THIAMINE HCL TAB 100 MG 100 MG: 100 TAB at 10:29

## 2018-12-03 RX ADMIN — Medication 5 ML: at 06:15

## 2018-12-03 RX ADMIN — ASPIRIN 81 MG: 81 TABLET, COATED ORAL at 08:30

## 2018-12-03 RX ADMIN — POTASSIUM CHLORIDE 40 MEQ: 750 TABLET, EXTENDED RELEASE ORAL at 20:43

## 2018-12-03 RX ADMIN — ISOSORBIDE DINITRATE 20 MG: 20 TABLET ORAL at 10:27

## 2018-12-03 RX ADMIN — OMEPRAZOLE 20 MG: 20 CAPSULE, DELAYED RELEASE ORAL at 08:30

## 2018-12-03 RX ADMIN — HYDRALAZINE HYDROCHLORIDE 75 MG: 50 TABLET ORAL at 20:43

## 2018-12-03 RX ADMIN — FUROSEMIDE 40 MG: 10 INJECTION, SOLUTION INTRAVENOUS at 14:22

## 2018-12-03 RX ADMIN — HYDRALAZINE HYDROCHLORIDE 75 MG: 50 TABLET ORAL at 10:28

## 2018-12-03 RX ADMIN — ISOSORBIDE DINITRATE 20 MG: 20 TABLET ORAL at 14:21

## 2018-12-03 RX ADMIN — SENNOSIDES AND DOCUSATE SODIUM 1 TABLET: 8.6; 5 TABLET ORAL at 10:29

## 2018-12-03 RX ADMIN — ATORVASTATIN CALCIUM 10 MG: 10 TABLET, FILM COATED ORAL at 10:29

## 2018-12-03 RX ADMIN — FUROSEMIDE 5 MG/HR: 10 INJECTION, SOLUTION INTRAVENOUS at 14:21

## 2018-12-03 RX ADMIN — ISOSORBIDE DINITRATE 20 MG: 20 TABLET ORAL at 20:43

## 2018-12-03 RX ADMIN — SENNOSIDES AND DOCUSATE SODIUM 1 TABLET: 8.6; 5 TABLET ORAL at 20:43

## 2018-12-03 ASSESSMENT — ACTIVITIES OF DAILY LIVING (ADL)
ADLS_ACUITY_SCORE: 11

## 2018-12-03 NOTE — PROGRESS NOTES
[facility]   Cardiology II Service / Advanced Heart Failure  Daily Progress Note  Date of Service: 12/3/2018      Patient: Danielito Chu  MRN: [id number]  Admission Date: 11/28/2018  Hospital Day # 5    Assessment and Plan:  Danielito is a 61 year old male with a past medical history of HTN, SHIRLEY, hyperlipidemia, s/p ICD placement on 4/7/17, and NICM, currently on home inotropes.  He presents this admission for acute decompensated heart failure with planned LVAD placement scheduled on 12/5/18.      Today's Plan:  1. Discontinue oral diuretics.  Change to IV Lasix gtt at 10 mg/hr with 40 mg IV bolus.      Chronic systolic heart failure secondary to unknown etiology.  Suspect viral per record review.   Stage D, NYHA Class IIIA  ACEi/ARB: Hydralazine 75 mg po TID. Isordil 20 mg po TID.   BB None. Milrinone 0.125 mcg/kg/min.   Aldosterone antagonist:  Discontinued secondary to hyperkalemia.     SCD prophylaxis ICD  Fluid status: Hypervolemic. Stop Bumex.  Start Lasix gtt at 5 mg/hr increased to 10 mg/hr this afternoon.  LVAD as planned on 12/5. Continue monitoring BMP's twice daily while on IV drip.  Sleep Apnea Evaluation: SHIRLEY on CPAP.     NSVT:  11 and 5 beat run of NSVT overnights.  -Continue to monitor.  Keep K >4.0 and Mag >2.0  -K 4.2 today.  -Mag 2.1 today.        Chronic/Resolved Medical Conditions:  RUQ pain, resolved. Secondary to hepatocongestion with subsequent resolution with diuresis. RUQ US on 11/29:  Negative for acute findings.  EKG, abdominal xray, VBG with oxyhemoglobin and Lactate WNL.  Mildly elevated Total bili of 3.5. (2.4).     Moderate Mitral Regurgitation. Echo 6/15/17 showed mild mitral insufficiency..  Hyperlipidemia. Continue Atorvastatin 10 mg daily.   HTN. MAPS 80's.  Controlled.  Continue Hydralazine and Isordil.   DM Type II. Novolog sliding scale insulin. BG today:  110-160      FEN: 2 gram NA diet   PROPHY:  Ambulation  LINES:  PICC  DISPO:  TBD   CODE STATUS: Full  Code.   ================================================================    Interval History/ROS: Feeling better today.  Headache has resolved. Denies SOB at rest, PND, orthopnea, edema, weight gain, chest pain, palpitations, lightheadedness, dizziness, near syncopal/syncopal episodes.  C/o it being dry.  Wants humidifier.  Gets SOB after walking to rehab department.  Feels he responded better to Lasix gtt, then Bumex/Lasix orally. Isn't urinating as much.  Has questions regarding subcostal LVAD approach and would like to talk with CVTS.      Last 24 hr care team notes reviewed.   ROS:  4 point ROS including Respiratory, CV, GI and , other than that noted in the HPI, is negative.     Medications: Reviewed in EPIC.     Physical Exam:   /77 (BP Location: Left arm)  Pulse 97  Temp 97.8  F (36.6  C) (Oral)  Resp 18  Wt 81.8 kg (180 lb 4.8 oz)  SpO2 98%  BMI 26.63 kg/m2  GENERAL: Appears alert and oriented times three. Lying in bed.   HEENT: EOMI. Mucous membranes moist and without lesions.  NECK: Supple and without lymphadenopathy. JVD below jaw.  No carotid bruits.  CV: RRR, S1S2 present +II/VI murmur heard best on RSB.  No rub or gallop.   RESPIRATORY: Respirations regular, even, and unlabored. Lungs CTA throughout.   GI: Soft and non distended with normoactive bowel sounds present in all quadrants. No tenderness, rebound, guarding. No hepatomegaly.   EXTREMITIES: No peripheral edema. 2+ bilateral pedal pulses.   NEUROLOGIC: Alert and orientated x 3. CN II-XII grossly intact. No focal deficits.   MUSCULOSKELETAL: No joint swelling or tenderness.   SKIN: No jaundice. No rashes, lesions, or cyanosis.  Skin turgor dry with mild tenting    Data:  CMP  Recent Labs  Lab 12/03/18  0624 12/02/18  2245 12/02/18  0536 12/01/18  0555 11/30/18  2349  11/30/18  0430  11/29/18  0412  11/28/18  1415    133 132* 131* 131*  --  132*  < > 134  < > 134   POTASSIUM 4.2 4.0 3.9 3.8 3.7  < > 4.2  < > 3.2*  < > 3.9    CHLORIDE 100 98 98 98 98  --  98  < > 99  < > 99   CO2 24 25 25 25 24  --  24  < > 26  < > 26   ANIONGAP 10 9 8 9 9  --  9  < > 9  < > 8   * 80 100* 109* 142*  --  119*  < > 114*  < > 83   BUN 28 30 29 24 25  --  25  < > 30  < > 30   CR 1.21 1.32* 1.35* 1.20 1.21  --  1.22  < > 1.14  < > 1.29*   GFRESTIMATED 61 55* 53* 61 61  --  60*  < > 65  < > 56*   GFRESTBLACK 73 66 65 74 73  --  73  < > 79  < > 68   ASHLEE 9.1 9.0 9.0 8.9 8.9  --  9.6  < > 9.0  < > 9.0   MAG 2.1 2.2  --  2.1 2.2  --  2.4*  < > 2.1  < >  --    PROTTOTAL  --   --   --   --  7.2  --  7.6  --  7.2  --  7.6   ALBUMIN  --   --   --   --  3.3*  --  3.5  --  3.6  --  3.6   BILITOTAL  --   --   --   --  2.9*  --  3.5*  --  2.4*  --  2.3*   ALKPHOS  --   --   --   --  90  --  94  --  84  --  86   AST  --   --   --   --  15  --  17  --  14  --  17   ALT  --   --   --   --  15  --  15  --  14  --  16   < > = values in this interval not displayed.  CBC  Recent Labs  Lab 12/03/18  0624 12/01/18  0555 11/29/18  0515 11/28/18  1415   WBC 8.5 9.6 10.6 9.0   RBC 4.68 4.87 4.95 5.10   HGB 14.6 15.3 15.5 16.2   HCT 45.2 47.0 47.7 48.8   MCV 97 97 96 96   MCH 31.2 31.4 31.3 31.8   MCHC 32.3 32.6 32.5 33.2   RDW 18.1* 18.1* 17.5* 17.5*    171 187 192     INR  Recent Labs  Lab 11/30/18  0430   INR 1.40*         Patient seen and discussed with Dr. Rene.            Kylie Faye APRN, CNP  [phone]  Cards II Service  12/3/2018

## 2018-12-03 NOTE — PROGRESS NOTES
CVTS:     Planning minimally invasive LVAD placement Wednesday, 12/5 with Dr Silva.   Pre-op orders written and released appropriately.   Questions answered.       Derick Castellon PA-C  Cardiothoracic Surgery  Pager [phone]    1:26 PM   December 3, 2018

## 2018-12-03 NOTE — PLAN OF CARE
Problem: Patient Care Overview  Goal: Plan of Care/Patient Progress Review  1. Pt will be hemodynamically stable.  2. Pt and family will verbalize understanding of plan of care.  3. Pt will remain free of falls  4. Pain will be under control or minimal   Outcome: No Change  Shift summary [id number]    Pt presented to ER with fluid overload. Pt had recently been hospitalized for heart failure and was worked up for a LVAD. Pt sent home on Milrinone and was planned to come back and have LVAD placed. Pt currently receiving IV diuretics along with continuous Milrinone gtt at .125 mq /kg /min. Pt voiding concentrated urine in fair amounts. Pt had been c/o abdominal pain felt to be due to hepatic congestion but that has resolved. Pt c/o HA, medicated with tylenol 650 mg po x 1 with little relief. Pt encouraged to ambulate but has only ambulated once in [place]. Pt steady on  feet just not very motivated. Pt's appetite poor. Pt's lungs diminished in bases but sating well on RA. Pt using IS. Pt has DL PICC.Pt has been in ST no episodes of VT this evening but did have 19 beats this am.POC Pt is planned for LVAD placement this Wednesday. Continue current medications and monitoring, notify MD's with questions or concerns.     Problem: Cardiac Output Decreased (Adult)  Goal: Identify Related Risk Factors and Signs and Symptoms  Related risk factors and signs and symptoms are identified upon initiation of Human Response Clinical Practice Guideline (CPG).   Outcome: No Change   12/02/18 2106   Cardiac Output Decreased   Related Risk Factors (Cardiac Output Decreased) heart rhythm altered;disease process;heart failure   Signs and Symptoms (Cardiac Output Decreased) decreased ejection fraction, stroke volume;other (see comments)       Problem: Cardiac: Heart Failure (Adult)  Goal: Signs and Symptoms of Listed Potential Problems Will be Absent, Minimized or Managed (Cardiac: Heart Failure)  Signs and symptoms of listed potential problems  will be absent, minimized or managed by discharge/transition of care (reference Cardiac: Heart Failure (Adult) CPG).   Outcome: No Change   12/02/18 2106   Cardiac: Heart Failure   Problems Assessed (Heart Failure) all   Problems Present (Heart Failure) cardiac pump dysfunction;fluid/electrolyte imbalance;situational response

## 2018-12-03 NOTE — PLAN OF CARE
Problem: Patient Care Overview  Goal: Plan of Care/Patient Progress Review  1. Pt will be hemodynamically stable.  2. Pt and family will verbalize understanding of plan of care.  3. Pt will remain free of falls  4. Pain will be under control or minimal   Outcome: No Change  D: Nocturnal oxygen desaturation  (primary encounter diagnosis)  Acute on chronic systolic heart failure (H)    I: Monitored vitals and assessed pt status.   Running: Milrinone 0.125 mcg/kg/min      A: A0x4. Sinus tach with 2 runs V-Tach overnight for 11 beats and 5 beats. Pt states no symptoms. MD aware.. Did not tolerate nasal cpap longer then 1/2 hour. Afebrile. Denies pain. K+ replaced per protocol. 600 mls dark yellow urine overnight. Blood sugar 116 & 110.    I/O this shift:  In: 141.75 [P.O.:120; I.V.:21.75]  Out: 600 [Urine:600]    Temp:  [97.3  F (36.3  C)-98  F (36.7  C)] 98  F (36.7  C)  Pulse:  [97] 97  Heart Rate:  [] 98  Resp:  [16-18] 16  BP: ()/(71-80) 100/75  FiO2 (%):  [21 %] 21 %  SpO2:  [95 %-98 %] 95 %      P: Continue to monitor Pt status and report changes to treatment team.

## 2018-12-04 ENCOUNTER — ANESTHESIA EVENT (OUTPATIENT)
Dept: SURGERY | Facility: CLINIC | Age: 63
DRG: 001 | End: 2018-12-04
Payer: COMMERCIAL

## 2018-12-04 LAB
ALBUMIN SERPL-MCNC: 3.2 G/DL (ref 3.4–5)
ALP SERPL-CCNC: 97 U/L (ref 40–150)
ALT SERPL W P-5'-P-CCNC: 13 U/L (ref 0–70)
ANION GAP SERPL CALCULATED.3IONS-SCNC: 11 MMOL/L (ref 3–14)
ANION GAP SERPL CALCULATED.3IONS-SCNC: 8 MMOL/L (ref 3–14)
AST SERPL W P-5'-P-CCNC: 14 U/L (ref 0–45)
BILIRUB DIRECT SERPL-MCNC: 1.3 MG/DL (ref 0–0.2)
BILIRUB SERPL-MCNC: 2 MG/DL (ref 0.2–1.3)
BUN SERPL-MCNC: 23 MG/DL (ref 7–30)
BUN SERPL-MCNC: 24 MG/DL (ref 7–30)
CALCIUM SERPL-MCNC: 8.4 MG/DL (ref 8.5–10.1)
CALCIUM SERPL-MCNC: 8.8 MG/DL (ref 8.5–10.1)
CHLORIDE SERPL-SCNC: 101 MMOL/L (ref 94–109)
CHLORIDE SERPL-SCNC: 101 MMOL/L (ref 94–109)
CO2 SERPL-SCNC: 25 MMOL/L (ref 20–32)
CO2 SERPL-SCNC: 26 MMOL/L (ref 20–32)
CREAT SERPL-MCNC: 1.12 MG/DL (ref 0.66–1.25)
CREAT SERPL-MCNC: 1.17 MG/DL (ref 0.66–1.25)
GFR SERPL CREATININE-BSD FRML MDRD: 63 ML/MIN/1.7M2
GFR SERPL CREATININE-BSD FRML MDRD: 66 ML/MIN/1.7M2
GLUCOSE BLDC GLUCOMTR-MCNC: 110 MG/DL (ref 70–99)
GLUCOSE BLDC GLUCOMTR-MCNC: 111 MG/DL (ref 70–99)
GLUCOSE BLDC GLUCOMTR-MCNC: 113 MG/DL (ref 70–99)
GLUCOSE BLDC GLUCOMTR-MCNC: 141 MG/DL (ref 70–99)
GLUCOSE BLDC GLUCOMTR-MCNC: 155 MG/DL (ref 70–99)
GLUCOSE SERPL-MCNC: 92 MG/DL (ref 70–99)
GLUCOSE SERPL-MCNC: 98 MG/DL (ref 70–99)
MAGNESIUM SERPL-MCNC: 2.1 MG/DL (ref 1.6–2.3)
MAGNESIUM SERPL-MCNC: 2.2 MG/DL (ref 1.6–2.3)
POTASSIUM SERPL-SCNC: 3.2 MMOL/L (ref 3.4–5.3)
POTASSIUM SERPL-SCNC: 3.7 MMOL/L (ref 3.4–5.3)
PROT SERPL-MCNC: 6.8 G/DL (ref 6.8–8.8)
SODIUM SERPL-SCNC: 135 MMOL/L (ref 133–144)
SODIUM SERPL-SCNC: 137 MMOL/L (ref 133–144)

## 2018-12-04 PROCEDURE — 83735 ASSAY OF MAGNESIUM: CPT | Performed by: NURSE PRACTITIONER

## 2018-12-04 PROCEDURE — 25000132 ZZH RX MED GY IP 250 OP 250 PS 637: Performed by: NURSE PRACTITIONER

## 2018-12-04 PROCEDURE — 86900 BLOOD TYPING SEROLOGIC ABO: CPT | Performed by: PHYSICIAN ASSISTANT

## 2018-12-04 PROCEDURE — 99233 SBSQ HOSP IP/OBS HIGH 50: CPT | Performed by: NURSE PRACTITIONER

## 2018-12-04 PROCEDURE — 25000128 H RX IP 250 OP 636: Performed by: STUDENT IN AN ORGANIZED HEALTH CARE EDUCATION/TRAINING PROGRAM

## 2018-12-04 PROCEDURE — 00000146 ZZHCL STATISTIC GLUCOSE BY METER IP

## 2018-12-04 PROCEDURE — 21400006 ZZH R&B CCU INTERMEDIATE UMMC

## 2018-12-04 PROCEDURE — 25000125 ZZHC RX 250: Performed by: PHYSICIAN ASSISTANT

## 2018-12-04 PROCEDURE — 25000128 H RX IP 250 OP 636: Performed by: NURSE PRACTITIONER

## 2018-12-04 PROCEDURE — 40000802 ZZH SITE CHECK

## 2018-12-04 PROCEDURE — 25000125 ZZHC RX 250: Performed by: NURSE PRACTITIONER

## 2018-12-04 PROCEDURE — 40000275 ZZH STATISTIC RCP TIME EA 10 MIN

## 2018-12-04 PROCEDURE — 86850 RBC ANTIBODY SCREEN: CPT | Performed by: PHYSICIAN ASSISTANT

## 2018-12-04 PROCEDURE — 86901 BLOOD TYPING SEROLOGIC RH(D): CPT | Performed by: PHYSICIAN ASSISTANT

## 2018-12-04 PROCEDURE — 80048 BASIC METABOLIC PNL TOTAL CA: CPT | Performed by: NURSE PRACTITIONER

## 2018-12-04 PROCEDURE — 80076 HEPATIC FUNCTION PANEL: CPT | Performed by: NURSE PRACTITIONER

## 2018-12-04 PROCEDURE — 86923 COMPATIBILITY TEST ELECTRIC: CPT | Performed by: PHYSICIAN ASSISTANT

## 2018-12-04 RX ORDER — COLCHICINE 0.6 MG/1
0.6 TABLET ORAL ONCE
Status: COMPLETED | OUTPATIENT
Start: 2018-12-04 | End: 2018-12-04

## 2018-12-04 RX ADMIN — OMEPRAZOLE 20 MG: 20 CAPSULE, DELAYED RELEASE ORAL at 09:01

## 2018-12-04 RX ADMIN — HYDRALAZINE HYDROCHLORIDE 75 MG: 50 TABLET ORAL at 14:41

## 2018-12-04 RX ADMIN — MILRINONE LACTATE 0.12 MCG/KG/MIN: 200 INJECTION, SOLUTION INTRAVENOUS at 23:02

## 2018-12-04 RX ADMIN — POTASSIUM CHLORIDE 40 MEQ: 750 TABLET, EXTENDED RELEASE ORAL at 09:01

## 2018-12-04 RX ADMIN — SENNOSIDES AND DOCUSATE SODIUM 1 TABLET: 8.6; 5 TABLET ORAL at 09:02

## 2018-12-04 RX ADMIN — POTASSIUM CHLORIDE 20 MEQ: 750 TABLET, EXTENDED RELEASE ORAL at 20:24

## 2018-12-04 RX ADMIN — ISOSORBIDE DINITRATE 20 MG: 20 TABLET ORAL at 14:41

## 2018-12-04 RX ADMIN — MUPIROCIN 1 G: 20 OINTMENT TOPICAL at 09:07

## 2018-12-04 RX ADMIN — ISOSORBIDE DINITRATE 20 MG: 20 TABLET ORAL at 20:24

## 2018-12-04 RX ADMIN — THIAMINE HCL TAB 100 MG 100 MG: 100 TAB at 09:02

## 2018-12-04 RX ADMIN — COLCHICINE 0.6 MG: 0.6 TABLET, FILM COATED ORAL at 09:37

## 2018-12-04 RX ADMIN — POTASSIUM CHLORIDE 20 MEQ: 750 TABLET, EXTENDED RELEASE ORAL at 11:34

## 2018-12-04 RX ADMIN — POTASSIUM CHLORIDE 40 MEQ: 750 TABLET, EXTENDED RELEASE ORAL at 20:24

## 2018-12-04 RX ADMIN — SODIUM CHLORIDE, PRESERVATIVE FREE 5 ML: 5 INJECTION INTRAVENOUS at 17:30

## 2018-12-04 RX ADMIN — ISOSORBIDE DINITRATE 20 MG: 20 TABLET ORAL at 09:01

## 2018-12-04 RX ADMIN — ASPIRIN 81 MG: 81 TABLET, COATED ORAL at 09:00

## 2018-12-04 RX ADMIN — SENNOSIDES AND DOCUSATE SODIUM 1 TABLET: 8.6; 5 TABLET ORAL at 20:24

## 2018-12-04 RX ADMIN — TRAZODONE HYDROCHLORIDE 100 MG: 100 TABLET ORAL at 00:14

## 2018-12-04 RX ADMIN — ATORVASTATIN CALCIUM 10 MG: 10 TABLET, FILM COATED ORAL at 09:00

## 2018-12-04 RX ADMIN — MUPIROCIN 1 G: 20 OINTMENT TOPICAL at 20:25

## 2018-12-04 RX ADMIN — POTASSIUM CHLORIDE 40 MEQ: 750 TABLET, EXTENDED RELEASE ORAL at 09:30

## 2018-12-04 RX ADMIN — HYDRALAZINE HYDROCHLORIDE 75 MG: 50 TABLET ORAL at 20:24

## 2018-12-04 RX ADMIN — COLCHICINE 0.6 MG: 0.6 TABLET, FILM COATED ORAL at 17:23

## 2018-12-04 RX ADMIN — HYDRALAZINE HYDROCHLORIDE 75 MG: 50 TABLET ORAL at 09:00

## 2018-12-04 RX ADMIN — FUROSEMIDE 10 MG/HR: 10 INJECTION, SOLUTION INTRAVENOUS at 22:23

## 2018-12-04 ASSESSMENT — ACTIVITIES OF DAILY LIVING (ADL)
ADLS_ACUITY_SCORE: 11

## 2018-12-04 ASSESSMENT — NEW YORK HEART ASSOCIATION (NYHA) CLASSIFICATION: NYHA FUNCTIONAL CLASS: III

## 2018-12-04 NOTE — PLAN OF CARE
Problem: Patient Care Overview  Goal: Plan of Care/Patient Progress Review  1. Pt will be hemodynamically stable.  2. Pt and family will verbalize understanding of plan of care.  3. Pt will remain free of falls  4. Pain will be under control or minimal   D: patient with hx of HF on home milrinone here awaiting LVAD implant planned 12/5.   I: Furosemide drip started this afternoon, 10 mg/hr with one time 40 mg bolus. Milrinone drip continues at 0.125 mcg/kg/min. Denies pain. Saline nasal spray prn ordered for sinus dryness.  A: A/Ox4, pleasant and cooperative. Denies pain. Up independently, voids in urinal.   P: Continue with current plan of care, notify cards 2 with questions/concerns. LVAD placement 12/5

## 2018-12-04 NOTE — PROGRESS NOTES
LVAD Social Work Services Progress Note      Date of Initial Social Work Evaluation: 10/23/18. Admission assessment done 11/30/18  Collaborated with: Patient, Sig other, Yanna, Brother, Brenden, and Brother, Akin    Data: Family here in the hospital with patient as he prepares for LVAD tomorrow. He was able to meet with another transplant patient and has been able to meet other LVAD patients as well. Fabiano had asked this writer to meet with family early this week to discuss lodging options again.  Intervention: Met with patient and family and talked about process of renting an [place] [address]. All apartments are being occupied, but there is nobody waiting on the list. With patient and family's permission, placed name on the list for an apartment. Patient also considering AirB&B or VRBO to look for an appropriate place. Inquired into questions/concerns and assessed coping.  Assessment: Fabiano seems to have good support. All seemed interested in an apartment. Fabiano is to have surgery. Will benefit from ongoing SW intervention for emotional support, lodging and other community resource options, etc...  Education provided by SW: Process of getting an apartment and ongoing availability of SW  Plan:    Discharge Plans in Progress: TBD    Barriers to d/c plan: Having LVAD tomorrow    Follow up Plan: SW will continue to follow for ongoing emotional support, connection to resources, psychosocial education post-VAD and assistance with discharge planning and lodging as needed.

## 2018-12-04 NOTE — PLAN OF CARE
Problem: Cardiac Output Decreased (Adult)  Goal: Identify Related Risk Factors and Signs and Symptoms  Related risk factors and signs and symptoms are identified upon initiation of Human Response Clinical Practice Guideline (CPG).   Outcome: No Change  D: Nocturnal oxygen desaturation  (primary encounter diagnosis)  Acute on chronic systolic heart failure (H)  HTN, Hyperlipidemia, S/P ICD placement on 4/7/17.    I: Monitored vitals and assessed pt status.   Changed: n/a  Running:Lasix and Milralone infusing.  PRN: Trazodone for sleep given as per request.    A: A0x4. AVSS, on RA and ST in 100s.  Blood check below 200 and no insulin given.     I/O this shift:  In: 100 [P.O.:100]  Out: 150 [Urine:150]    Temp:  [97.6  F (36.4  C)-97.9  F (36.6  C)] 97.9  F (36.6  C)  Pulse:  [103] 103  Heart Rate:  [102-113] 102  Resp:  [16-18] 16  BP: (100-105)/(73-77) 100/74  SpO2:  [97 %-98 %] 97 %      P: Awaiting non-invasive LVAD placement on 12/5/18.

## 2018-12-04 NOTE — PROVIDER NOTIFICATION
Per tele room, pt had 28 beats of possible Atach at 0752. Aster Cards 2 notified this afternoon at ~1230.    Will continue to monitor pt.

## 2018-12-04 NOTE — PROGRESS NOTES
[facility]   Cardiology II Service / Advanced Heart Failure  Daily Progress Note      Patient: Danielito Chu  MRN: [id number]  Admission Date: 11/28/2018  Hospital Day # 6    Assessment and Plan: Danielito Chu is a 63 year old male with a past medical history of HTN, SHIRLEY, hyperlipidemia, s/p ICD placement on 4/7/17, and NICM, currently on home inotropes.  He presents this admission for acute decompensated heart failure with planned LVAD placement scheduled on 12/5/18.    Today's Plan:  -continue lasix gtt at 10/hr goal neg neg 1.5L  -NPO at midnight for LVAD tomorrow, no pre-op IABP or [place] per CVTS  -colchicine x2 for gout symptoms    #Chronic systolic heart failure secondary to unknown etiology.  Suspect viral per record review.   Stage D, NYHA Class IIIA    Fluid status: Hypervolemic but improved, continue lasix gtt 10/hr  Inotrope: Milrinone 0.125 mcg/kg/min via PICC  ACEi/ARB/Afterload reduction: Hydralazine 75 mg TID. Isordil 20 mg TID.   BB: None due to inotrope  Aldosterone antagonist:  Discontinued 2/2 hyperkalemia.     SCD prophylaxis: s/p ICD  Other: LVAD as planned on 12/5.      #NSVT: 11 and 5 beat run of NSVT overnights.  -Continue to monitor. Keep K >4.0 and Mag >2. With lyte protocol, q12hr BMP, Mg.     #RUQ pain, resolved. Secondary to hepatocongestion with subsequent resolution with diuresis. RUQ US on 11/29: Negative for acute findings.  EKG, abdominal xray, VBG with oxyhemoglobin and Lactate WNL. Mildly elevated Total bili improving with diuresis.     #Hyperlipidemia. Continue atorvastatin 10 mg daily.     #Gout. Reporting prodrome of gout symptom in left big toe. Historically has used prednisone but given colchicine x2 12/4 due to upcoming surgery.  - monitor post-op    #DM Type II. Novolog sliding scale insulin. BG consistently <180.      FEN: 2 gram NA   PROPHY: Ambulatory, PPI  LINES:  PICC  DISPO: pending LVAD implant  CODE STATUS: Full Code.      ================================================================    Subjective/24-Hr Events:   Last 24 hr care team notes reviewed. No overnight events. No further orthopnea, he feels breathing is easy. Some BAJWA when taking long walks. No other concerns today.     ROS:  4 point ROS including respiratory, CV, GI and  (other than that noted in the HPI) is negative.     Medications: Reviewed in EPIC.     Physical Exam:   BP 99/72 (BP Location: Left arm)  Pulse 103  Temp 97.5  F (36.4  C) (Oral)  Resp 16  Wt 81.9 kg (180 lb 8 oz)  SpO2 97%  BMI 26.66 kg/m2    GENERAL: Appears comfortable, in no distress.  HEENT: Eye symmetrical, no discharge or icterus bilaterally. Mucous membranes moist and without lesions.  NECK: Supple, JVD midneck at 45 degrees.   CV: tachycardiac and regular, +S1S2, no murmur, rub, or gallop.   RESPIRATORY: Respirations regular, even, and unlabored. Lungs CTA throughout.   GI: Soft and non distended with normoactive bowel sounds present in all quadrants. No tenderness, rebound, guarding.   EXTREMITIES: No peripheral edema. 2+ bilateral pedal pulses.   NEUROLOGIC: Alert and oriented x 3. No focal deficits.   MUSCULOSKELETAL: No joint swelling or tenderness.   SKIN: No jaundice. No rashes or lesions.     Labs:  CMP    Recent Labs  Lab 12/04/18  0800 12/03/18  1850 12/03/18  0624 12/02/18  2245  11/30/18  2349  11/30/18  0430    134 134 133  < > 131*  --  132*   POTASSIUM 3.2* 3.6 4.2 4.0  < > 3.7  < > 4.2   CHLORIDE 101 100 100 98  < > 98  --  98   CO2 25 24 24 25  < > 24  --  24   ANIONGAP 11 10 10 9  < > 9  --  9   GLC 92 144* 106* 80  < > 142*  --  119*   BUN 24 30 28 30  < > 25  --  25   CR 1.17 1.35* 1.21 1.32*  < > 1.21  --  1.22   GFRESTIMATED 63 53* 61 55*  < > 61  --  60*   GFRESTBLACK 76 65 73 66  < > 73  --  73   ASHLEE 8.4* 8.7 9.1 9.0  < > 8.9  --  9.6   MAG 2.1 2.3 2.1 2.2  < > 2.2  --  2.4*   PROTTOTAL 6.8 7.1  --   --   --  7.2  --  7.6   ALBUMIN 3.2* 3.2*  --   --   --   3.3*  --  3.5   BILITOTAL 2.0* 2.2*  --   --   --  2.9*  --  3.5*   ALKPHOS 97 99  --   --   --  90  --  94   AST 14 12  --   --   --  15  --  17   ALT 13 14  --   --   --  15  --  15   < > = values in this interval not displayed.    CBC    Recent Labs  Lab 12/03/18  0624 12/01/18  0555 11/29/18  0515 11/28/18  1415   WBC 8.5 9.6 10.6 9.0   RBC 4.68 4.87 4.95 5.10   HGB 14.6 15.3 15.5 16.2   HCT 45.2 47.0 47.7 48.8   MCV 97 97 96 96   MCH 31.2 31.4 31.3 31.8   MCHC 32.3 32.6 32.5 33.2   RDW 18.1* 18.1* 17.5* 17.5*    171 187 192       INR    Recent Labs  Lab 11/30/18  0430   INR 1.40*       Time/Communication  I personally spent a total of 25 minutes. Of that 15 minutes was counseling/coordination of patient's care. Plan of care discussed with patient. See my note above for details.    Patient discussed with Dr. Rene.      Aster Purcell, TAMANNA, NP-C  Advanced Heart Failure/Cardiology II Service  Pager [phone]

## 2018-12-04 NOTE — PROGRESS NOTES
CLINICAL NUTRITION SERVICES - ASSESSMENT NOTE     Nutrition Prescription    RECOMMENDATIONS FOR MDs/PROVIDERS TO ORDER:  If pt should need TFs post-op (if unable to extubate pt and advance diet within ~48 hours), would rec place feeding tube (order XR Feeding Tube placement for radiology to place) and initiate TFs. See TF recs in future/additional rec #9 below.     Recommendations already ordered by Registered Dietitian (RD):  Prn snack/supplement order      Future/Additional Recommendations:  1. Continue current diet as ordered. If oral intake decreases, then consider liberalizing diet.  2. Fluid restriction if needed, per team.   3. Replace K+.   4. Consider checking folic acid lab.    5. Monitor BG control. Hgb A1c of 6.4 on 10/24/18 (DM II hx).   6. Order a multivitamin with minerals if inadequate nutrition intake.    7. Once post-op, pt would be interested in trying oral supplements including Magic Cup (orange or berry), Boost Plus or Glucose Control (chocolate), and Gelatein Plus (cherry).  8. Consider discontinuing thiamine post-op LVAD.   9. If pt has an LVAD placed and if pt needs TFs, would rec place feeding tube (FT) and initiate TFs, Impact Peptide 1.5 (immune modulating TF formula, high protein). Initiate TFs at 10 mL/hr, advancing rate by 10 mL Q 8 hrs (or per provider discretion, pending hemodynamic stability) to goal rate of 65 mL/hr. This provides 1560 mL TF, 2340 kcals (29 kcal/kg/day), 147 g PRO (1.8 g/kg/day), 1201 ml free H2O, 100 g Fat (50% from MCTs), 218 g CHO and no fiber daily.                          If begin tube feeds:                         - Flush FT with 30 mL water Q 4 hrs for patency. Rec provider adjust free water flushes as needed, pending fluid status.                        - Ensure K+/Mg++/Phos labs are ordered daily until TFs advance to goal infusion to evaluate for sx of refeeding with nutrition received. If lytes trend low, aggressively replace lytes.                        "     - If not already ordered, order a multivitamin/mineral (certavite 15 mL/day via FT) to help ensure micronutrient needs being met with suspected hypermetabolic demands and potential interruptions to TF infusions.                        - Monitor TF and possible need to adjust nutrition support regimen if necessary, pending medical course and nutrition status.                            - If Impact Peptide 1.5 TF is started, order a baseline CRP lab and then CRP labs for the subsequent two Mondays     REASON FOR ASSESSMENT  Danielito Chu is a/an 63 year old male assessed by the dietitian for LOS and anticipate will receive a provider consult for nutrition education with comments assessment and education (automatic consult on post-op CVTS order set).     NUTRITION HISTORY  Pt known to this service from prior admissions. Received consults/referrals for LVAD workup on 10/23, LVAD workup/heart Tx hybrid 10/24, and outpatient LVAD workup on 11/13/18. RD on 11/13/18 noted, \"Pt or his sister cook at home. They do not add salt. He reports overall 'bad taste' in mouth, which somewhat resembled acid and/or oil. He reports some desire to lose weight.\" Reduced appetite due to bad taste in mouth, this in not a new issue. He did purchase a treadmill recently. Pt received post-LVAD nutrition education and post-Tx nutrition education. Pt has received low-sodium nutrition education in the past.   Diet Recall as per RD on 11/13/18   Breakfast Banana, apple, cereal    Lunch None    Dinner Pork, chicken, steak with some rice/potatoes (not nightly) and either green beans or corn every night    Snacks Watermelon    Beverages 2 L fluid restriction; water, 16 oz OJ most days    Alcohol None    Dining out Tries to avoid    H & P indicates pt was admitted with orthopnea, difficulty sleeping, and mild fluid overload. PMH includes NICM on inotropes, DM II, and small hiatal hernia. Pt with mild abdominal bloating but appetite has been " "ok. Pt ordered to take, noting, biotene, potassium, and thiamine PTA.   Discussed nutrition history with pt and significant other, Yanna. Per pt, his appetite was ok PTA. He has been making an effort to lose some wt and remarked that his goal wt is 170# (77.3 kg). Pt believes he has also lost some wt due to having heart failure.     CURRENT NUTRITION ORDERS  Diet: 2 g Sodium diet since 11/29. NPO for possible LVAD placement surgery tomorrow.   Intake/Tolerance: Tolerating diet. Flowsheets indicate pt consuming 0-100% of meals with a good appetite 11/30, 75% of meals 12/1, 100% of meals with a good appetite 12/2, and % of meals with a fair appetite 12/3. Per pt, his appetite has been good the past two days. States his appetite had been a bit decreased due to abdominal fullness and pain (noted 12/2). Per pt, he has had some taste changes. Has dry mouth and using biotene but states this has not helped him significantly. Potential factors affecting nutrition intake include upcoming surgery, unknown extubation and diet advancement timeframe.     LABS  Labs reviewed    MEDICATIONS  Medications reviewed    ANTHROPOMETRICS  Height: 1.753 m (5' 9\")   Most Recent Weight: 81.9 kg (180 lb 8 oz)    IBW: 72.7 kg    BMI: Overweight BMI 25-29.9  Weight History: 107.3 kg (10/19/17), 91.4 kg (6/15/18), 91.6 kg (10/16/18), 85.7 kg (11/13/18) - Pt has lost 11% of his body wt over the past approximate six months. Wt loss is likely multifactorial due to intentional wt loss, fluid loss with diuresis, and hypermetabolism with heart failure.  Dosing Weight: 82 kg (based on lowest wt this admission of 81.6 kg on 12/1)    ASSESSED NUTRITION NEEDS  Estimated Energy Needs: 8811-8289 kcals/day (25 - 30  kcals/kg)  Justification: Maintenance needs, although rec high end of this range  Estimated Protein Needs:  grams protein/day (1.1 - 1.4 grams of pro/kg)  Justification: Increased needs with cardiac status. Estimated needs increase to " 123-164 g protein/day (1.5-2 g protein/kg) after LVAD is placed.  Estimated Fluid Needs: 1943-2727 mL/day (20-25 mL/kg)  Justification: Estimated needs, may require the high end of this range, pending fluid status    PHYSICAL FINDINGS  See malnutrition section below.    MALNUTRITION  % Intake: Not meeting this criteria.     % Weight Loss: Difficult to assess as pt has tried to lose wt as well.   Subcutaneous Fat Loss: None observed  Muscle Loss: Mild, generalized. Per pt, he has especially lost muscle in his lower body (patellar region and posterior calf)  Fluid Accumulation/Edema: None noted  Malnutrition Diagnosis: Patient does not meet two of the above criteria necessary for diagnosing malnutrition but is at risk for malnutrition    NUTRITION DIAGNOSIS  Predicted inadequate nutrient intake (protein-energy) related to upcoming surgery, unknown extubation and diet advancement timeframe.     INTERVENTIONS  Implementation  Nutrition Education: Post-op LVAD nutrition education if/when appropriate. Has had LVAD workup nutrition education. Discussed recommendation for oral intake adequacy and for post-op healing. Rec he not try to lose wt currently or for at least two months post-op.   Medical food supplement therapy: Prn snack/supplement order placed. Pt may ask for a snack or supplement if desired. Aware of recommendation for oral supplements post-op.     Goals  Diet adv v nutrition support within 2-3 days post-op.  Total avg nutritional intake to meet a minimum of 25 kcal/kg and 1.5 g PRO/kg daily (per dosing wt 82 kg) once post-op.     Monitoring/Evaluation  Progress toward goals will be monitored and evaluated per protocol.      Nutrition will continue to follow.      Rachel Bradshaw, MS, RD, LD, [facility]   6C Pgr: [phone]

## 2018-12-04 NOTE — PLAN OF CARE
Problem: Patient Care Overview  Goal: Plan of Care/Patient Progress Review  1. Pt will be hemodynamically stable.  2. Pt and family will verbalize understanding of plan of care.  3. Pt will remain free of falls  4. Pain will be under control or minimal   Outcome: No Change  Pt A/O. See flowsheets for VS. BP soft this AM, Cards 2 NP aware. NSR/ST; possible [place] this AM (see writer's previous note). RA. Denies pain. BG assessed, no sliding scale insulin given. Milrinone gtt continues @ 0.125 mcg/kg/min. Lasix gtt continues @ 10mg/hr. K+ (3.2) replaced per protocol. Given Colchicine this AM. Continue with plan of care and report changes to treatment team. Plan for LVAD placement tomorrow (12/5).

## 2018-12-04 NOTE — ANESTHESIA PREPROCEDURE EVALUATION
Anesthesia Pre-Procedure Evaluation    Patient: Danielito Chu   MRN:     [id number] Gender:   male   Age:    63 year old :      1955        Preoperative Diagnosis: Heart Failure    Procedure(s):  Minimally Invasive Heartmate III      Past Medical History:   Diagnosis Date     Acute systolic congestive heart failure (H) 2017     Diabetes (H)      HTN (hypertension)      Hyperlipidemia      Mitral regurgitation      Nocturnal oxygen desaturation 3/29/2018     Nonischemic cardiomyopathy (H) 2017     SHIRLEY (obstructive sleep apnea)      Pacemaker 2017    ICD 17      Past Surgical History:   Procedure Laterality Date     ESOPHAGOSCOPY, GASTROSCOPY, DUODENOSCOPY (EGD), COMBINED N/A 2018    Procedure: COMBINED ESOPHAGOSCOPY, GASTROSCOPY, DUODENOSCOPY (EGD);  Surgeon: Candido Mendez MD;  Location:  GI     IMPLANT IMPLANTABLE CARDIOVERTER DEFIBRILLATOR            Anesthesia Evaluation     . Pt has had prior anesthetic. Type: General and MAC    No history of anesthetic complications          ROS/MED HX    ENT/Pulmonary:     (+)sleep apnea, doesn't use CPAP , . .    Neurologic:  - neg neurologic ROS     Cardiovascular:     (+) hypertension----. : . CHF etiology: NICM Last EF: 12% date: 2018 NYHA classification: III. BAJWA, . :ICD Reason placed:cardiomyopathy  type;[employer] . . Previous cardiac testing Echodate:18results:Interpretation Summary  Limited study. Severely dilated LV with severely reduced LV systolic function,  LVEF=10-15% (traced 12%.)  Valves were not assessed on this limited study.  Mildly dilated RV with mildly reduced function.  Dilation of the inferior vena cava is present with abnormal respiratory  variation in diameter. Estimated mean right atrial pressure is 15 mmHg  (significantly elevated).  This study was compared to a TTE from 10/23/2018: There has been no  significant change as assessed.date: results: date: results:Cath date: 2018  results:Normal right filling pressures, high left side filling pressures (wedge 28 mmHg), mild post-capillary HTN, reduced cardiac output, 3.2 L/min (index 1.6 L/min/m2)            METS/Exercise Tolerance: Comment: METS 2, able to walk slowly around hospital unit    Hematologic:  - neg hematologic  ROS       Musculoskeletal: Comment: gout        GI/Hepatic: Comment: Hepatic fibrosis with no evidence of portal hypertension on imaging or EGD        Renal/Genitourinary:  - ROS Renal section negative       Endo:     (+) type II DM Last HgA1c: 6.4 date: 10/24/18 Not using insulin .      Psychiatric:     (+) psychiatric history depression      Infectious Disease:  - neg infectious disease ROS       Malignancy:      - no malignancy   Other:    - neg other ROS                     PHYSICAL EXAM:   Mental Status/Neuro: A/A/O   Airway: Facies: Feasible  Mallampati: II  Mouth/Opening: Full  TM distance: > 6 cm  Neck ROM: Full   Respiratory: Auscultation: CTAB     Resp. Rate: Normal     Resp. Effort: Normal      CV: Rhythm: Regular  Rate: Age appropriate  Heart: Normal Sounds   Comments:      Dental:                  Lab Results   Component Value Date    WBC 8.5 12/03/2018    HGB 14.6 12/03/2018    HCT 45.2 12/03/2018     12/03/2018    CRP 3.1 10/27/2018     12/04/2018    POTASSIUM 3.2 (L) 12/04/2018    CHLORIDE 101 12/04/2018    CO2 25 12/04/2018    BUN 24 12/04/2018    CR 1.17 12/04/2018    GLC 92 12/04/2018    ASHLEE 8.4 (L) 12/04/2018    PHOS 4.0 11/14/2018    MAG 2.1 12/04/2018    ALBUMIN 3.2 (L) 12/04/2018    PROTTOTAL 6.8 12/04/2018    ALT 13 12/04/2018    AST 14 12/04/2018    ALKPHOS 97 12/04/2018    BILITOTAL 2.0 (H) 12/04/2018    LIPASE 51 (L) 11/30/2018    AMYLASE 20 (L) 11/30/2018    PTT 36 10/24/2018    INR 1.40 (H) 11/30/2018    TSH 6.66 (H) 10/24/2018    T4 1.24 10/24/2018       Preop Vitals  BP Readings from Last 3 Encounters:   12/04/18 101/76   11/26/18 104/71   11/21/18 116/80    Pulse Readings from  "Last 3 Encounters:   18 103   18 107   18 98      Resp Readings from Last 3 Encounters:   18 16   18 16   18 19    SpO2 Readings from Last 3 Encounters:   18 96%   18 97%   18 96%      Temp Readings from Last 1 Encounters:   18 36.4  C (97.6  F) (Oral)    Ht Readings from Last 1 Encounters:   18 1.753 m (5' 9\")      Wt Readings from Last 1 Encounters:   18 81.9 kg (180 lb 8 oz)    Estimated body mass index is 26.66 kg/(m^2) as calculated from the following:    Height as of 18: 1.753 m (5' 9\").    Weight as of this encounter: 81.9 kg (180 lb 8 oz).     LDA:  Peripheral IV 18 Right;Lateral Lower forearm (Active)   Site Assessment WDL 2018  9:00 AM   Line Status Infusing 2018  9:00 AM   Phlebitis Scale 0-->no symptoms 2018  9:00 AM   Infiltration Scale 0 2018  9:00 AM   Extravasation? No 12/3/2018  7:45 PM   Number of days:1       PICC Double Lumen 18 Right Basilic (Active)   Site Assessment WDL 2018  9:00 AM   External Cath Length (cm) 3 cm 2018  2:00 PM   Extremity Circumference (cm) 31 cm 2018  2:00 PM   Dressing Intervention Chlorhexidine patch;Transparent;Securing device;New dressing 2018  2:00 PM   Extravasation? No 2018  4:00 AM   Dressing Change Due 18  2:00 PM   PICC Comment CDI 2018  3:30 PM   PICC Lumen Assessment Trigger Garza;Purple 2018  3:30 PM   Number of days:18            Assessment:   ASA SCORE: 4       Documentation: H&P complete; Preop Testing complete; Consents complete   Proceeding: Proceed without further delay  Tobacco Use:  NO Active use of Tobacco/UNKNOWN Tobacco use status     Plan:   Anes. Type:  General   Pre-Induction: Midazolam IV     Fluid/Blood-Preparation: Hot Line; PRBC; Cell Saver; FFP; Blood in Room   Induction:  IV (Standard); Rocuronium; Etomidate   Airway: Oral ETT   Access/Monitorinnd PIV; A-Line; FloTrac     " Advanced Access: CVL by anesthesia; PAC by anesthesia; CPB   Maintenance: Balanced   Emergence: Postop SECURED AIRWAY likely   Logistics: ICU Admission     Postop Pain/Sedation Strategy:  Standard-Options: Opioids PRN     PONV Management:  Adult Risk Factors:, Non-Smoker, Postop Opioids     CONSENT: Direct conversation   Plan and risks discussed with: Patient; Sibling   Blood Products: Consented (ALL Blood Products)     Comments for Plan/Consent:  ICD turned off. Plan for GETA, preop A-line, CVC with PAC. Postop ICU admission                         Serge Henderson MD

## 2018-12-05 ENCOUNTER — APPOINTMENT (OUTPATIENT)
Dept: GENERAL RADIOLOGY | Facility: CLINIC | Age: 63
DRG: 001 | End: 2018-12-05
Attending: THORACIC SURGERY (CARDIOTHORACIC VASCULAR SURGERY)
Payer: COMMERCIAL

## 2018-12-05 ENCOUNTER — ALLIED HEALTH/NURSE VISIT (OUTPATIENT)
Dept: CARDIOLOGY | Facility: CLINIC | Age: 63
DRG: 001 | End: 2018-12-05
Attending: INTERNAL MEDICINE
Payer: COMMERCIAL

## 2018-12-05 ENCOUNTER — ANESTHESIA (OUTPATIENT)
Dept: SURGERY | Facility: CLINIC | Age: 63
DRG: 001 | End: 2018-12-05
Payer: COMMERCIAL

## 2018-12-05 DIAGNOSIS — I42.8 NONISCHEMIC CARDIOMYOPATHY (H): Primary | ICD-10-CM

## 2018-12-05 PROBLEM — I50.9 HEART FAILURE (H): Status: ACTIVE | Noted: 2018-12-05

## 2018-12-05 LAB
ABO + RH BLD: NORMAL
ABO + RH BLD: NORMAL
ANGLE RATE OF CLOT GROWTH: 58.9 DEG (ref 59–74)
ANGLE RATE OF CLOT GROWTH: 69.6 DEG (ref 59–74)
ANGLE RATE OF CLOT GROWTH: 70.1 DEG (ref 59–74)
ANGLE RATE OF CLOT GROWTH: 70.7 DEG (ref 59–74)
ANION GAP SERPL CALCULATED.3IONS-SCNC: 10 MMOL/L (ref 3–14)
ANION GAP SERPL CALCULATED.3IONS-SCNC: 12 MMOL/L (ref 3–14)
ANION GAP SERPL CALCULATED.3IONS-SCNC: 12 MMOL/L (ref 3–14)
ANION GAP SERPL CALCULATED.3IONS-SCNC: 13 MMOL/L (ref 3–14)
APTT PPP: 35 SEC (ref 22–37)
APTT PPP: 60 SEC (ref 22–37)
BASE DEFICIT BLDA-SCNC: 0.5 MMOL/L
BASE DEFICIT BLDA-SCNC: 1.8 MMOL/L
BASE DEFICIT BLDA-SCNC: 2.1 MMOL/L
BASE DEFICIT BLDA-SCNC: 3.3 MMOL/L
BASE DEFICIT BLDA-SCNC: 3.6 MMOL/L
BASE DEFICIT BLDA-SCNC: 4 MMOL/L
BASE DEFICIT BLDA-SCNC: 4.3 MMOL/L
BASE DEFICIT BLDV-SCNC: 0.8 MMOL/L
BASE DEFICIT BLDV-SCNC: 3 MMOL/L
BASE EXCESS BLDV CALC-SCNC: 2 MMOL/L
BLD GP AB SCN SERPL QL: NORMAL
BLD PROD TYP BPU: NORMAL
BLD UNIT ID BPU: 0
BLOOD BANK CMNT PATIENT-IMP: NORMAL
BLOOD PRODUCT CODE: NORMAL
BPU ID: NORMAL
BUN SERPL-MCNC: 20 MG/DL (ref 7–30)
BUN SERPL-MCNC: 21 MG/DL (ref 7–30)
CA-I BLD-MCNC: 4 MG/DL (ref 4.4–5.2)
CA-I BLD-MCNC: 4 MG/DL (ref 4.4–5.2)
CA-I BLD-MCNC: 4.1 MG/DL (ref 4.4–5.2)
CA-I BLD-MCNC: 4.4 MG/DL (ref 4.4–5.2)
CA-I BLD-MCNC: 4.4 MG/DL (ref 4.4–5.2)
CA-I BLD-MCNC: 5 MG/DL (ref 4.4–5.2)
CA-I BLD-MCNC: 5.3 MG/DL (ref 4.4–5.2)
CALCIUM SERPL-MCNC: 8.1 MG/DL (ref 8.5–10.1)
CALCIUM SERPL-MCNC: 8.7 MG/DL (ref 8.5–10.1)
CALCIUM SERPL-MCNC: 8.8 MG/DL (ref 8.5–10.1)
CALCIUM SERPL-MCNC: 9.3 MG/DL (ref 8.5–10.1)
CHLORIDE SERPL-SCNC: 102 MMOL/L (ref 94–109)
CHLORIDE SERPL-SCNC: 109 MMOL/L (ref 94–109)
CHLORIDE SERPL-SCNC: 110 MMOL/L (ref 94–109)
CHLORIDE SERPL-SCNC: 110 MMOL/L (ref 94–109)
CI HYPERCOAGULATION INDEX: 2.1 RATIO (ref 0–3)
CI HYPERCOAGULATION INDEX: 2.3 RATIO (ref 0–3)
CI HYPERCOAGULATION INDEX: 2.3 RATIO (ref 0–3)
CI HYPOCOAGULATION INDEX: 1.2 RATIO (ref 0–3)
CLOT LYSIS 30M P MA LENFR BLD TEG: 0 % (ref 0–8)
CLOT STRENGTH BLD TEG: 11.1 KD/SC (ref 5.3–13.2)
CLOT STRENGTH BLD TEG: 11.4 KD/SC (ref 5.3–13.2)
CLOT STRENGTH BLD TEG: 12.9 KD/SC (ref 5.3–13.2)
CLOT STRENGTH BLD TEG: 9.7 KD/SC (ref 5.3–13.2)
CO2 SERPL-SCNC: 20 MMOL/L (ref 20–32)
CO2 SERPL-SCNC: 20 MMOL/L (ref 20–32)
CO2 SERPL-SCNC: 24 MMOL/L (ref 20–32)
CO2 SERPL-SCNC: 24 MMOL/L (ref 20–32)
CREAT SERPL-MCNC: 1.04 MG/DL (ref 0.66–1.25)
CREAT SERPL-MCNC: 1.1 MG/DL (ref 0.66–1.25)
CREAT SERPL-MCNC: 1.14 MG/DL (ref 0.66–1.25)
CREAT SERPL-MCNC: 1.17 MG/DL (ref 0.66–1.25)
ERYTHROCYTE [DISTWIDTH] IN BLOOD BY AUTOMATED COUNT: 18 % (ref 10–15)
ERYTHROCYTE [DISTWIDTH] IN BLOOD BY AUTOMATED COUNT: 18.2 % (ref 10–15)
FIBRINOGEN PPP-MCNC: 379 MG/DL (ref 200–420)
GFR SERPL CREATININE-BSD FRML MDRD: 63 ML/MIN/1.7M2
GFR SERPL CREATININE-BSD FRML MDRD: 65 ML/MIN/1.7M2
GFR SERPL CREATININE-BSD FRML MDRD: 68 ML/MIN/1.7M2
GFR SERPL CREATININE-BSD FRML MDRD: 72 ML/MIN/1.7M2
GLUCOSE BLD-MCNC: 155 MG/DL (ref 70–99)
GLUCOSE BLD-MCNC: 156 MG/DL (ref 70–99)
GLUCOSE BLD-MCNC: 157 MG/DL (ref 70–99)
GLUCOSE BLD-MCNC: 179 MG/DL (ref 70–99)
GLUCOSE BLD-MCNC: 200 MG/DL (ref 70–99)
GLUCOSE BLDC GLUCOMTR-MCNC: 101 MG/DL (ref 70–99)
GLUCOSE BLDC GLUCOMTR-MCNC: 122 MG/DL (ref 70–99)
GLUCOSE BLDC GLUCOMTR-MCNC: 91 MG/DL (ref 70–99)
GLUCOSE BLDC GLUCOMTR-MCNC: 95 MG/DL (ref 70–99)
GLUCOSE SERPL-MCNC: 106 MG/DL (ref 70–99)
GLUCOSE SERPL-MCNC: 114 MG/DL (ref 70–99)
GLUCOSE SERPL-MCNC: 142 MG/DL (ref 70–99)
GLUCOSE SERPL-MCNC: 96 MG/DL (ref 70–99)
HCO3 BLD-SCNC: 21 MMOL/L (ref 21–28)
HCO3 BLD-SCNC: 22 MMOL/L (ref 21–28)
HCO3 BLD-SCNC: 23 MMOL/L (ref 21–28)
HCO3 BLD-SCNC: 24 MMOL/L (ref 21–28)
HCO3 BLDV-SCNC: 24 MMOL/L (ref 21–28)
HCO3 BLDV-SCNC: 26 MMOL/L (ref 21–28)
HCO3 BLDV-SCNC: 27 MMOL/L (ref 21–28)
HCT VFR BLD AUTO: 34.1 % (ref 40–53)
HCT VFR BLD AUTO: 38.7 % (ref 40–53)
HCT VFR BLD AUTO: 40.7 % (ref 40–53)
HCT VFR BLD AUTO: 45.5 % (ref 40–53)
HGB BLD-MCNC: 10.8 G/DL (ref 13.3–17.7)
HGB BLD-MCNC: 11.6 G/DL (ref 13.3–17.7)
HGB BLD-MCNC: 12.1 G/DL (ref 13.3–17.7)
HGB BLD-MCNC: 12.2 G/DL (ref 13.3–17.7)
HGB BLD-MCNC: 12.4 G/DL (ref 13.3–17.7)
HGB BLD-MCNC: 13 G/DL (ref 13.3–17.7)
HGB BLD-MCNC: 13.7 G/DL (ref 13.3–17.7)
HGB BLD-MCNC: 14.5 G/DL (ref 13.3–17.7)
HGB BLD-MCNC: 9.9 G/DL (ref 13.3–17.7)
INR PPP: 1.67 (ref 0.86–1.14)
INR PPP: 2.03 (ref 0.86–1.14)
INTERPRETATION ECG - MUSE: NORMAL
K TIME TO SPEC CLOT STRENGTH: 1.3 MIN (ref 1–3)
K TIME TO SPEC CLOT STRENGTH: 1.4 MIN (ref 1–3)
K TIME TO SPEC CLOT STRENGTH: 1.5 MIN (ref 1–3)
K TIME TO SPEC CLOT STRENGTH: 2.3 MIN (ref 1–3)
LACTATE BLD-SCNC: 1.3 MMOL/L (ref 0.7–2)
LACTATE BLD-SCNC: 2 MMOL/L (ref 0.7–2)
LACTATE BLD-SCNC: 2.6 MMOL/L (ref 0.7–2)
LACTATE BLD-SCNC: 3 MMOL/L (ref 0.7–2)
LACTATE BLD-SCNC: 3.1 MMOL/L (ref 0.7–2)
LACTATE BLD-SCNC: 4 MMOL/L (ref 0.7–2)
LACTATE BLD-SCNC: 4.6 MMOL/L (ref 0.7–2)
LACTATE BLD-SCNC: 4.9 MMOL/L (ref 0.7–2)
LY60 LYSIS AT 60 MINUTES: 0 % (ref 0–15)
LY60 LYSIS AT 60 MINUTES: 0.2 % (ref 0–15)
LY60 LYSIS AT 60 MINUTES: 0.5 % (ref 0–15)
LY60 LYSIS AT 60 MINUTES: 0.8 % (ref 0–15)
MA MAXIMUM CLOT STRENGTH: 66 MM (ref 55–74)
MA MAXIMUM CLOT STRENGTH: 68.9 MM (ref 55–74)
MA MAXIMUM CLOT STRENGTH: 69.5 MM (ref 55–74)
MA MAXIMUM CLOT STRENGTH: 72.1 MM (ref 55–74)
MAGNESIUM SERPL-MCNC: 1.8 MG/DL (ref 1.6–2.3)
MAGNESIUM SERPL-MCNC: 2.1 MG/DL (ref 1.6–2.3)
MAGNESIUM SERPL-MCNC: 2.2 MG/DL (ref 1.6–2.3)
MAGNESIUM SERPL-MCNC: 2.3 MG/DL (ref 1.6–2.3)
MCH RBC QN AUTO: 30.4 PG (ref 26.5–33)
MCH RBC QN AUTO: 30.8 PG (ref 26.5–33)
MCH RBC QN AUTO: 30.8 PG (ref 26.5–33)
MCH RBC QN AUTO: 31.1 PG (ref 26.5–33)
MCHC RBC AUTO-ENTMCNC: 31.7 G/DL (ref 31.5–36.5)
MCHC RBC AUTO-ENTMCNC: 31.9 G/DL (ref 31.5–36.5)
MCHC RBC AUTO-ENTMCNC: 31.9 G/DL (ref 31.5–36.5)
MCHC RBC AUTO-ENTMCNC: 32 G/DL (ref 31.5–36.5)
MCV RBC AUTO: 96 FL (ref 78–100)
MCV RBC AUTO: 98 FL (ref 78–100)
MRSA DNA SPEC QL NAA+PROBE: NEGATIVE
NUM BPU REQUESTED: 2
NUM BPU REQUESTED: 4
NUM BPU REQUESTED: 4
O2/TOTAL GAS SETTING VFR VENT: 100 %
O2/TOTAL GAS SETTING VFR VENT: 60 %
O2/TOTAL GAS SETTING VFR VENT: 75 %
O2/TOTAL GAS SETTING VFR VENT: 80 %
O2/TOTAL GAS SETTING VFR VENT: 80 %
OXYHGB MFR BLD: 96 % (ref 92–100)
OXYHGB MFR BLD: 97 % (ref 92–100)
OXYHGB MFR BLD: 97 % (ref 92–100)
OXYHGB MFR BLDV: 68 %
OXYHGB MFR BLDV: 78 %
PCO2 BLD: 33 MM HG (ref 35–45)
PCO2 BLD: 36 MM HG (ref 35–45)
PCO2 BLD: 37 MM HG (ref 35–45)
PCO2 BLD: 38 MM HG (ref 35–45)
PCO2 BLD: 46 MM HG (ref 35–45)
PCO2 BLDV: 41 MM HG (ref 40–50)
PCO2 BLDV: 47 MM HG (ref 40–50)
PCO2 BLDV: 51 MM HG (ref 40–50)
PH BLD: 7.33 PH (ref 7.35–7.45)
PH BLD: 7.35 PH (ref 7.35–7.45)
PH BLD: 7.35 PH (ref 7.35–7.45)
PH BLD: 7.36 PH (ref 7.35–7.45)
PH BLD: 7.38 PH (ref 7.35–7.45)
PH BLD: 7.39 PH (ref 7.35–7.45)
PH BLD: 7.46 PH (ref 7.35–7.45)
PH BLDV: 7.31 PH (ref 7.32–7.43)
PH BLDV: 7.32 PH (ref 7.32–7.43)
PH BLDV: 7.43 PH (ref 7.32–7.43)
PHOSPHATE SERPL-MCNC: 2.3 MG/DL (ref 2.5–4.5)
PLATELET # BLD AUTO: 116 10E9/L (ref 150–450)
PLATELET # BLD AUTO: 170 10E9/L (ref 150–450)
PLATELET # BLD AUTO: 89 10E9/L (ref 150–450)
PLATELET # BLD AUTO: 90 10E9/L (ref 150–450)
PLATELET # BLD AUTO: 99 10E9/L (ref 150–450)
PO2 BLD: 155 MM HG (ref 80–105)
PO2 BLD: 189 MM HG (ref 80–105)
PO2 BLD: 214 MM HG (ref 80–105)
PO2 BLD: 317 MM HG (ref 80–105)
PO2 BLD: 324 MM HG (ref 80–105)
PO2 BLD: 330 MM HG (ref 80–105)
PO2 BLD: 337 MM HG (ref 80–105)
PO2 BLDV: 37 MM HG (ref 25–47)
PO2 BLDV: 51 MM HG (ref 25–47)
PO2 BLDV: 52 MM HG (ref 25–47)
POTASSIUM BLD-SCNC: 3 MMOL/L (ref 3.4–5.3)
POTASSIUM BLD-SCNC: 3.7 MMOL/L (ref 3.4–5.3)
POTASSIUM BLD-SCNC: 3.7 MMOL/L (ref 3.4–5.3)
POTASSIUM BLD-SCNC: 3.8 MMOL/L (ref 3.4–5.3)
POTASSIUM BLD-SCNC: 4 MMOL/L (ref 3.4–5.3)
POTASSIUM BLD-SCNC: 4.2 MMOL/L (ref 3.4–5.3)
POTASSIUM SERPL-SCNC: 3.7 MMOL/L (ref 3.4–5.3)
POTASSIUM SERPL-SCNC: 3.7 MMOL/L (ref 3.4–5.3)
POTASSIUM SERPL-SCNC: 3.8 MMOL/L (ref 3.4–5.3)
POTASSIUM SERPL-SCNC: 4.1 MMOL/L (ref 3.4–5.3)
R TIME UNTIL CLOT FORMS: 4.8 MIN (ref 4–9)
R TIME UNTIL CLOT FORMS: 5.1 MIN (ref 4–9)
R TIME UNTIL CLOT FORMS: 5.5 MIN (ref 4–9)
R TIME UNTIL CLOT FORMS: 7.8 MIN (ref 4–9)
RBC # BLD AUTO: 3.55 10E12/L (ref 4.4–5.9)
RBC # BLD AUTO: 4.02 10E12/L (ref 4.4–5.9)
RBC # BLD AUTO: 4.22 10E12/L (ref 4.4–5.9)
RBC # BLD AUTO: 4.66 10E12/L (ref 4.4–5.9)
SODIUM BLD-SCNC: 136 MMOL/L (ref 133–144)
SODIUM BLD-SCNC: 137 MMOL/L (ref 133–144)
SODIUM BLD-SCNC: 137 MMOL/L (ref 133–144)
SODIUM BLD-SCNC: 138 MMOL/L (ref 133–144)
SODIUM BLD-SCNC: 140 MMOL/L (ref 133–144)
SODIUM SERPL-SCNC: 138 MMOL/L (ref 133–144)
SODIUM SERPL-SCNC: 142 MMOL/L (ref 133–144)
SODIUM SERPL-SCNC: 142 MMOL/L (ref 133–144)
SODIUM SERPL-SCNC: 144 MMOL/L (ref 133–144)
SPECIMEN EXP DATE BLD: NORMAL
SPECIMEN SOURCE: NORMAL
TRANSFUSION STATUS PATIENT QL: NORMAL
WBC # BLD AUTO: 10.1 10E9/L (ref 4–11)
WBC # BLD AUTO: 7.1 10E9/L (ref 4–11)
WBC # BLD AUTO: 8.2 10E9/L (ref 4–11)
WBC # BLD AUTO: 9.9 10E9/L (ref 4–11)

## 2018-12-05 PROCEDURE — 27410274 ZZH DEVICE HM III POWER UNIT MOBILE W/AC PWR CABLE, INITIAL ONLY, EACH

## 2018-12-05 PROCEDURE — 93325 DOPPLER ECHO COLOR FLOW MAPG: CPT | Mod: 26 | Performed by: INTERNAL MEDICINE

## 2018-12-05 PROCEDURE — 25000128 H RX IP 250 OP 636: Performed by: STUDENT IN AN ORGANIZED HEALTH CARE EDUCATION/TRAINING PROGRAM

## 2018-12-05 PROCEDURE — P9041 ALBUMIN (HUMAN),5%, 50ML: HCPCS

## 2018-12-05 PROCEDURE — 84295 ASSAY OF SERUM SODIUM: CPT | Performed by: THORACIC SURGERY (CARDIOTHORACIC VASCULAR SURGERY)

## 2018-12-05 PROCEDURE — 83735 ASSAY OF MAGNESIUM: CPT | Performed by: NURSE PRACTITIONER

## 2018-12-05 PROCEDURE — 25000125 ZZHC RX 250

## 2018-12-05 PROCEDURE — 40000048 ZZH STATISTIC DAILY SWAN MONITORING

## 2018-12-05 PROCEDURE — 25000125 ZZHC RX 250: Performed by: STUDENT IN AN ORGANIZED HEALTH CARE EDUCATION/TRAINING PROGRAM

## 2018-12-05 PROCEDURE — 94002 VENT MGMT INPAT INIT DAY: CPT

## 2018-12-05 PROCEDURE — 27210447 ZZH PACK CELL SAVER CSP: Performed by: THORACIC SURGERY (CARDIOTHORACIC VASCULAR SURGERY)

## 2018-12-05 PROCEDURE — 93503 INSERT/PLACE HEART CATHETER: CPT

## 2018-12-05 PROCEDURE — 85027 COMPLETE CBC AUTOMATED: CPT | Performed by: FAMILY MEDICINE

## 2018-12-05 PROCEDURE — 84100 ASSAY OF PHOSPHORUS: CPT | Performed by: NURSE PRACTITIONER

## 2018-12-05 PROCEDURE — 84132 ASSAY OF SERUM POTASSIUM: CPT | Performed by: THORACIC SURGERY (CARDIOTHORACIC VASCULAR SURGERY)

## 2018-12-05 PROCEDURE — 93010 ELECTROCARDIOGRAM REPORT: CPT | Performed by: INTERNAL MEDICINE

## 2018-12-05 PROCEDURE — 93312 ECHO TRANSESOPHAGEAL: CPT | Mod: 26 | Performed by: INTERNAL MEDICINE

## 2018-12-05 PROCEDURE — 5A1221Z PERFORMANCE OF CARDIAC OUTPUT, CONTINUOUS: ICD-10-PCS | Performed by: THORACIC SURGERY (CARDIOTHORACIC VASCULAR SURGERY)

## 2018-12-05 PROCEDURE — 85730 THROMBOPLASTIN TIME PARTIAL: CPT | Performed by: INTERNAL MEDICINE

## 2018-12-05 PROCEDURE — 25000125 ZZHC RX 250: Performed by: FAMILY MEDICINE

## 2018-12-05 PROCEDURE — 80048 BASIC METABOLIC PNL TOTAL CA: CPT | Performed by: THORACIC SURGERY (CARDIOTHORACIC VASCULAR SURGERY)

## 2018-12-05 PROCEDURE — 93320 DOPPLER ECHO COMPLETE: CPT | Mod: 26 | Performed by: INTERNAL MEDICINE

## 2018-12-05 PROCEDURE — 80048 BASIC METABOLIC PNL TOTAL CA: CPT | Performed by: NURSE PRACTITIONER

## 2018-12-05 PROCEDURE — 99291 CRITICAL CARE FIRST HOUR: CPT | Mod: GC | Performed by: INTERNAL MEDICINE

## 2018-12-05 PROCEDURE — 85610 PROTHROMBIN TIME: CPT | Performed by: INTERNAL MEDICINE

## 2018-12-05 PROCEDURE — 02HA0QZ INSERTION OF IMPLANTABLE HEART ASSIST SYSTEM INTO HEART, OPEN APPROACH: ICD-10-PCS | Performed by: THORACIC SURGERY (CARDIOTHORACIC VASCULAR SURGERY)

## 2018-12-05 PROCEDURE — 25000128 H RX IP 250 OP 636: Performed by: FAMILY MEDICINE

## 2018-12-05 PROCEDURE — 27410245 ZZH DEVICE HM II POCKET BATTERY HOLSTER, INITIAL ONLY

## 2018-12-05 PROCEDURE — 25000128 H RX IP 250 OP 636

## 2018-12-05 PROCEDURE — 25000128 H RX IP 250 OP 636: Performed by: ANESTHESIOLOGY

## 2018-12-05 PROCEDURE — 85027 COMPLETE CBC AUTOMATED: CPT | Performed by: SURGERY

## 2018-12-05 PROCEDURE — 27210794 ZZH OR GENERAL SUPPLY STERILE: Performed by: THORACIC SURGERY (CARDIOTHORACIC VASCULAR SURGERY)

## 2018-12-05 PROCEDURE — 93287 PERI-PX DEVICE EVAL & PRGR: CPT | Mod: ZF

## 2018-12-05 PROCEDURE — 85396 CLOTTING ASSAY WHOLE BLOOD: CPT | Performed by: INTERNAL MEDICINE

## 2018-12-05 PROCEDURE — 40000275 ZZH STATISTIC RCP TIME EA 10 MIN

## 2018-12-05 PROCEDURE — 27410239 ZZH DEVICE HM II 14-V LITH BATTERY CLIP BOX, INITIAL ONLY: Mod: 59

## 2018-12-05 PROCEDURE — 37000009 ZZH ANESTHESIA TECHNICAL FEE, EACH ADDTL 15 MIN: Performed by: THORACIC SURGERY (CARDIOTHORACIC VASCULAR SURGERY)

## 2018-12-05 PROCEDURE — 85384 FIBRINOGEN ACTIVITY: CPT | Performed by: INTERNAL MEDICINE

## 2018-12-05 PROCEDURE — 80048 BASIC METABOLIC PNL TOTAL CA: CPT | Performed by: FAMILY MEDICINE

## 2018-12-05 PROCEDURE — 27410276 ZZH DEVICE HM III IMPLANT KIT

## 2018-12-05 PROCEDURE — 85730 THROMBOPLASTIN TIME PARTIAL: CPT | Performed by: NURSE PRACTITIONER

## 2018-12-05 PROCEDURE — P9073 PLATELETS PHERESIS PATH REDU: HCPCS | Performed by: INTERNAL MEDICINE

## 2018-12-05 PROCEDURE — 27211040 ZZH CONTINUOUS NEBULIZER MICRO PUMP

## 2018-12-05 PROCEDURE — 41000019 ZZH PERA-PERFUSION EACH ADDTL 15 MIN: Performed by: THORACIC SURGERY (CARDIOTHORACIC VASCULAR SURGERY)

## 2018-12-05 PROCEDURE — 5A02216 ASSISTANCE WITH CARDIAC OUTPUT USING OTHER PUMP, CONTINUOUS: ICD-10-PCS | Performed by: THORACIC SURGERY (CARDIOTHORACIC VASCULAR SURGERY)

## 2018-12-05 PROCEDURE — 25000128 H RX IP 250 OP 636: Performed by: THORACIC SURGERY (CARDIOTHORACIC VASCULAR SURGERY)

## 2018-12-05 PROCEDURE — 82803 BLOOD GASES ANY COMBINATION: CPT | Performed by: THORACIC SURGERY (CARDIOTHORACIC VASCULAR SURGERY)

## 2018-12-05 PROCEDURE — 00000146 ZZHCL STATISTIC GLUCOSE BY METER IP

## 2018-12-05 PROCEDURE — 40000986 XR CHEST PORT 1 VW

## 2018-12-05 PROCEDURE — C9113 INJ PANTOPRAZOLE SODIUM, VIA: HCPCS | Performed by: THORACIC SURGERY (CARDIOTHORACIC VASCULAR SURGERY)

## 2018-12-05 PROCEDURE — 25000125 ZZHC RX 250: Performed by: ANESTHESIOLOGY

## 2018-12-05 PROCEDURE — 85027 COMPLETE CBC AUTOMATED: CPT | Performed by: THORACIC SURGERY (CARDIOTHORACIC VASCULAR SURGERY)

## 2018-12-05 PROCEDURE — 80048 BASIC METABOLIC PNL TOTAL CA: CPT | Performed by: SURGERY

## 2018-12-05 PROCEDURE — 94644 CONT INHLJ TX 1ST HOUR: CPT

## 2018-12-05 PROCEDURE — 82947 ASSAY GLUCOSE BLOOD QUANT: CPT | Performed by: THORACIC SURGERY (CARDIOTHORACIC VASCULAR SURGERY)

## 2018-12-05 PROCEDURE — 25000128 H RX IP 250 OP 636: Performed by: SURGERY

## 2018-12-05 PROCEDURE — 83605 ASSAY OF LACTIC ACID: CPT | Performed by: THORACIC SURGERY (CARDIOTHORACIC VASCULAR SURGERY)

## 2018-12-05 PROCEDURE — 40000344 ZZHCL STATISTIC THAWING COMPONENT: Performed by: INTERNAL MEDICINE

## 2018-12-05 PROCEDURE — 87641 MR-STAPH DNA AMP PROBE: CPT | Performed by: THORACIC SURGERY (CARDIOTHORACIC VASCULAR SURGERY)

## 2018-12-05 PROCEDURE — 82330 ASSAY OF CALCIUM: CPT | Performed by: THORACIC SURGERY (CARDIOTHORACIC VASCULAR SURGERY)

## 2018-12-05 PROCEDURE — 40000065 ZZH STATISTIC EKG NON-CHARGEABLE

## 2018-12-05 PROCEDURE — 3E043XZ INTRODUCTION OF VASOPRESSOR INTO CENTRAL VEIN, PERCUTANEOUS APPROACH: ICD-10-PCS | Performed by: THORACIC SURGERY (CARDIOTHORACIC VASCULAR SURGERY)

## 2018-12-05 PROCEDURE — 25000128 H RX IP 250 OP 636: Performed by: NURSE PRACTITIONER

## 2018-12-05 PROCEDURE — 85027 COMPLETE CBC AUTOMATED: CPT | Performed by: NURSE PRACTITIONER

## 2018-12-05 PROCEDURE — 25000128 H RX IP 250 OP 636: Performed by: PHYSICIAN ASSISTANT

## 2018-12-05 PROCEDURE — 25000132 ZZH RX MED GY IP 250 OP 250 PS 637: Performed by: NURSE PRACTITIONER

## 2018-12-05 PROCEDURE — P9041 ALBUMIN (HUMAN),5%, 50ML: HCPCS | Performed by: FAMILY MEDICINE

## 2018-12-05 PROCEDURE — 40000170 ZZH STATISTIC PRE-PROCEDURE ASSESSMENT II: Performed by: THORACIC SURGERY (CARDIOTHORACIC VASCULAR SURGERY)

## 2018-12-05 PROCEDURE — 83735 ASSAY OF MAGNESIUM: CPT | Performed by: FAMILY MEDICINE

## 2018-12-05 PROCEDURE — 27410246 ZZH DEVICE HM II POCKET SHOWER BAG, INITIAL ONLY, EACH

## 2018-12-05 PROCEDURE — 93005 ELECTROCARDIOGRAM TRACING: CPT

## 2018-12-05 PROCEDURE — 84100 ASSAY OF PHOSPHORUS: CPT | Performed by: FAMILY MEDICINE

## 2018-12-05 PROCEDURE — 37000008 ZZH ANESTHESIA TECHNICAL FEE, 1ST 30 MIN: Performed by: THORACIC SURGERY (CARDIOTHORACIC VASCULAR SURGERY)

## 2018-12-05 PROCEDURE — 36000076 ZZH SURGERY LEVEL 6 EA 15 ADDTL MIN - UMMC: Performed by: THORACIC SURGERY (CARDIOTHORACIC VASCULAR SURGERY)

## 2018-12-05 PROCEDURE — 93287 PERI-PX DEVICE EVAL & PRGR: CPT | Mod: ZP | Performed by: INTERNAL MEDICINE

## 2018-12-05 PROCEDURE — 83735 ASSAY OF MAGNESIUM: CPT | Performed by: THORACIC SURGERY (CARDIOTHORACIC VASCULAR SURGERY)

## 2018-12-05 PROCEDURE — 27410241 ZZH DEVICE HM II 14-V LITHIUM BATTERY, INITIAL ONLY, EACH: Mod: 59

## 2018-12-05 PROCEDURE — 94645 CONT INHLJ TX EACH ADDL HOUR: CPT

## 2018-12-05 PROCEDURE — P9016 RBC LEUKOCYTES REDUCED: HCPCS | Performed by: PHYSICIAN ASSISTANT

## 2018-12-05 PROCEDURE — 93325 DOPPLER ECHO COLOR FLOW MAPG: CPT

## 2018-12-05 PROCEDURE — 20000004 ZZH R&B ICU UMMC

## 2018-12-05 PROCEDURE — 85049 AUTOMATED PLATELET COUNT: CPT | Performed by: INTERNAL MEDICINE

## 2018-12-05 PROCEDURE — 88305 TISSUE EXAM BY PATHOLOGIST: CPT | Performed by: THORACIC SURGERY (CARDIOTHORACIC VASCULAR SURGERY)

## 2018-12-05 PROCEDURE — P9059 PLASMA, FRZ BETWEEN 8-24HOUR: HCPCS | Performed by: INTERNAL MEDICINE

## 2018-12-05 PROCEDURE — 27410254 ZZH DEVICE HM II UNIVERSAL BATTERY CHARGER, INITIAL ONLY, EACH

## 2018-12-05 PROCEDURE — 27210460 ZZH PUMP APP ADULT PERFUSION: Performed by: THORACIC SURGERY (CARDIOTHORACIC VASCULAR SURGERY)

## 2018-12-05 PROCEDURE — 87640 STAPH A DNA AMP PROBE: CPT | Performed by: THORACIC SURGERY (CARDIOTHORACIC VASCULAR SURGERY)

## 2018-12-05 PROCEDURE — 25000125 ZZHC RX 250: Performed by: THORACIC SURGERY (CARDIOTHORACIC VASCULAR SURGERY)

## 2018-12-05 PROCEDURE — 36000074 ZZH SURGERY LEVEL 6 1ST 30 MIN - UMMC: Performed by: THORACIC SURGERY (CARDIOTHORACIC VASCULAR SURGERY)

## 2018-12-05 PROCEDURE — 40000196 ZZH STATISTIC RAPCV CVP MONITORING

## 2018-12-05 PROCEDURE — 85610 PROTHROMBIN TIME: CPT | Performed by: NURSE PRACTITIONER

## 2018-12-05 PROCEDURE — 27210995 ZZH RX 272: Performed by: THORACIC SURGERY (CARDIOTHORACIC VASCULAR SURGERY)

## 2018-12-05 PROCEDURE — 25000125 ZZHC RX 250: Performed by: SURGERY

## 2018-12-05 PROCEDURE — 82805 BLOOD GASES W/O2 SATURATION: CPT | Performed by: THORACIC SURGERY (CARDIOTHORACIC VASCULAR SURGERY)

## 2018-12-05 PROCEDURE — 27410275 ZZH DEVICE HM III CONTROLLER, INITIAL ONLY, EACH

## 2018-12-05 PROCEDURE — C1768 GRAFT, VASCULAR: HCPCS | Performed by: THORACIC SURGERY (CARDIOTHORACIC VASCULAR SURGERY)

## 2018-12-05 PROCEDURE — 83735 ASSAY OF MAGNESIUM: CPT | Performed by: SURGERY

## 2018-12-05 PROCEDURE — 40000014 ZZH STATISTIC ARTERIAL MONITORING DAILY

## 2018-12-05 PROCEDURE — 25000132 ZZH RX MED GY IP 250 OP 250 PS 637: Performed by: SURGERY

## 2018-12-05 PROCEDURE — 41000018 ZZH PER-PERFUSION 1ST 30 MIN: Performed by: THORACIC SURGERY (CARDIOTHORACIC VASCULAR SURGERY)

## 2018-12-05 DEVICE — IMPLANTABLE DEVICE: Type: IMPLANTABLE DEVICE | Site: HEART | Status: FUNCTIONAL

## 2018-12-05 DEVICE — GRAFT GORTEX 18MMX40CM STRETCH SA1804: Type: IMPLANTABLE DEVICE | Site: CHEST | Status: FUNCTIONAL

## 2018-12-05 RX ORDER — VASOPRESSIN 20 U/ML
INJECTION PARENTERAL PRN
Status: DISCONTINUED | OUTPATIENT
Start: 2018-12-05 | End: 2018-12-05

## 2018-12-05 RX ORDER — POTASSIUM CHLORIDE 29.8 MG/ML
20 INJECTION INTRAVENOUS
Status: DISCONTINUED | OUTPATIENT
Start: 2018-12-05 | End: 2018-12-18 | Stop reason: HOSPADM

## 2018-12-05 RX ORDER — POTASSIUM CHLORIDE 7.45 MG/ML
10 INJECTION INTRAVENOUS
Status: DISCONTINUED | OUTPATIENT
Start: 2018-12-05 | End: 2018-12-18 | Stop reason: HOSPADM

## 2018-12-05 RX ORDER — OXYCODONE HYDROCHLORIDE 5 MG/1
5-10 TABLET ORAL
Status: DISCONTINUED | OUTPATIENT
Start: 2018-12-05 | End: 2018-12-13

## 2018-12-05 RX ORDER — ALBUMIN, HUMAN INJ 5% 5 %
SOLUTION INTRAVENOUS
Status: COMPLETED
Start: 2018-12-05 | End: 2018-12-05

## 2018-12-05 RX ORDER — NITROGLYCERIN 20 MG/100ML
.07-2 INJECTION INTRAVENOUS CONTINUOUS
Status: DISCONTINUED | OUTPATIENT
Start: 2018-12-05 | End: 2018-12-06 | Stop reason: CLARIF

## 2018-12-05 RX ORDER — PROTAMINE SULFATE 10 MG/ML
INJECTION, SOLUTION INTRAVENOUS PRN
Status: DISCONTINUED | OUTPATIENT
Start: 2018-12-05 | End: 2018-12-05

## 2018-12-05 RX ORDER — HYDRALAZINE HYDROCHLORIDE 20 MG/ML
10 INJECTION INTRAMUSCULAR; INTRAVENOUS EVERY 30 MIN PRN
Status: DISCONTINUED | OUTPATIENT
Start: 2018-12-05 | End: 2018-12-13

## 2018-12-05 RX ORDER — SODIUM CHLORIDE 9 MG/ML
INJECTION, SOLUTION INTRAVENOUS CONTINUOUS PRN
Status: DISCONTINUED | OUTPATIENT
Start: 2018-12-05 | End: 2018-12-05

## 2018-12-05 RX ORDER — ASPIRIN 81 MG/1
81 TABLET, CHEWABLE ORAL DAILY
Status: DISCONTINUED | OUTPATIENT
Start: 2018-12-06 | End: 2018-12-18 | Stop reason: HOSPADM

## 2018-12-05 RX ORDER — POTASSIUM CL/LIDO/0.9 % NACL 10MEQ/0.1L
10 INTRAVENOUS SOLUTION, PIGGYBACK (ML) INTRAVENOUS
Status: DISCONTINUED | OUTPATIENT
Start: 2018-12-05 | End: 2018-12-18 | Stop reason: HOSPADM

## 2018-12-05 RX ORDER — MEPERIDINE HYDROCHLORIDE 50 MG/ML
12.5-25 INJECTION INTRAMUSCULAR; INTRAVENOUS; SUBCUTANEOUS
Status: DISCONTINUED | OUTPATIENT
Start: 2018-12-05 | End: 2018-12-08

## 2018-12-05 RX ORDER — FLUCONAZOLE 2 MG/ML
200 INJECTION, SOLUTION INTRAVENOUS EVERY 24 HOURS
Status: COMPLETED | OUTPATIENT
Start: 2018-12-06 | End: 2018-12-07

## 2018-12-05 RX ORDER — POTASSIUM CHLORIDE 1.5 G/1.58G
20-40 POWDER, FOR SOLUTION ORAL
Status: DISCONTINUED | OUTPATIENT
Start: 2018-12-05 | End: 2018-12-18 | Stop reason: HOSPADM

## 2018-12-05 RX ORDER — HYDROMORPHONE HYDROCHLORIDE 1 MG/ML
.3-.5 INJECTION, SOLUTION INTRAMUSCULAR; INTRAVENOUS; SUBCUTANEOUS
Status: DISCONTINUED | OUTPATIENT
Start: 2018-12-05 | End: 2018-12-18 | Stop reason: HOSPADM

## 2018-12-05 RX ORDER — FLUCONAZOLE 2 MG/ML
200 INJECTION, SOLUTION INTRAVENOUS
Status: COMPLETED | OUTPATIENT
Start: 2018-12-05 | End: 2018-12-05

## 2018-12-05 RX ORDER — ALBUMIN, HUMAN INJ 5% 5 %
25 SOLUTION INTRAVENOUS ONCE
Status: COMPLETED | OUTPATIENT
Start: 2018-12-05 | End: 2018-12-05

## 2018-12-05 RX ORDER — PROPOFOL 10 MG/ML
5-75 INJECTION, EMULSION INTRAVENOUS CONTINUOUS
Status: DISCONTINUED | OUTPATIENT
Start: 2018-12-05 | End: 2018-12-07

## 2018-12-05 RX ORDER — DEXTROSE MONOHYDRATE, SODIUM CHLORIDE, AND POTASSIUM CHLORIDE 50; 1.49; 4.5 G/1000ML; G/1000ML; G/1000ML
INJECTION, SOLUTION INTRAVENOUS CONTINUOUS
Status: DISCONTINUED | OUTPATIENT
Start: 2018-12-05 | End: 2018-12-05

## 2018-12-05 RX ORDER — FENTANYL CITRATE 50 UG/ML
INJECTION, SOLUTION INTRAMUSCULAR; INTRAVENOUS PRN
Status: DISCONTINUED | OUTPATIENT
Start: 2018-12-05 | End: 2018-12-05

## 2018-12-05 RX ORDER — ALBUMIN, HUMAN INJ 5% 5 %
500-1000 SOLUTION INTRAVENOUS
Status: COMPLETED | OUTPATIENT
Start: 2018-12-05 | End: 2018-12-05

## 2018-12-05 RX ORDER — LEVOFLOXACIN 5 MG/ML
500 INJECTION, SOLUTION INTRAVENOUS EVERY 24 HOURS
Status: COMPLETED | OUTPATIENT
Start: 2018-12-06 | End: 2018-12-07

## 2018-12-05 RX ORDER — POLYETHYLENE GLYCOL 3350 17 G/17G
17 POWDER, FOR SOLUTION ORAL 2 TIMES DAILY
Status: DISCONTINUED | OUTPATIENT
Start: 2018-12-06 | End: 2018-12-18 | Stop reason: HOSPADM

## 2018-12-05 RX ORDER — MILRINONE LACTATE 0.2 MG/ML
0.25-0.75 INJECTION, SOLUTION INTRAVENOUS CONTINUOUS
Status: DISCONTINUED | OUTPATIENT
Start: 2018-12-05 | End: 2018-12-05

## 2018-12-05 RX ORDER — CALCIUM CHLORIDE 100 MG/ML
INJECTION INTRAVENOUS; INTRAVENTRICULAR PRN
Status: DISCONTINUED | OUTPATIENT
Start: 2018-12-05 | End: 2018-12-05

## 2018-12-05 RX ORDER — LEVOFLOXACIN 5 MG/ML
500 INJECTION, SOLUTION INTRAVENOUS
Status: COMPLETED | OUTPATIENT
Start: 2018-12-05 | End: 2018-12-05

## 2018-12-05 RX ORDER — MAGNESIUM SULFATE HEPTAHYDRATE 40 MG/ML
4 INJECTION, SOLUTION INTRAVENOUS EVERY 4 HOURS PRN
Status: DISCONTINUED | OUTPATIENT
Start: 2018-12-05 | End: 2018-12-18 | Stop reason: HOSPADM

## 2018-12-05 RX ORDER — DEXMEDETOMIDINE HYDROCHLORIDE 4 UG/ML
.2-.7 INJECTION, SOLUTION INTRAVENOUS CONTINUOUS
Status: DISCONTINUED | OUTPATIENT
Start: 2018-12-05 | End: 2018-12-07

## 2018-12-05 RX ORDER — POTASSIUM CHLORIDE 750 MG/1
20-40 TABLET, EXTENDED RELEASE ORAL
Status: DISCONTINUED | OUTPATIENT
Start: 2018-12-05 | End: 2018-12-18 | Stop reason: HOSPADM

## 2018-12-05 RX ORDER — MUPIROCIN 20 MG/G
1 OINTMENT TOPICAL 2 TIMES DAILY
Status: COMPLETED | OUTPATIENT
Start: 2018-12-05 | End: 2018-12-09

## 2018-12-05 RX ORDER — MILRINONE LACTATE 0.2 MG/ML
0.12 INJECTION, SOLUTION INTRAVENOUS CONTINUOUS
Status: DISCONTINUED | OUTPATIENT
Start: 2018-12-05 | End: 2018-12-07

## 2018-12-05 RX ORDER — ETOMIDATE 2 MG/ML
INJECTION INTRAVENOUS PRN
Status: DISCONTINUED | OUTPATIENT
Start: 2018-12-05 | End: 2018-12-05

## 2018-12-05 RX ORDER — LIDOCAINE 40 MG/G
CREAM TOPICAL
Status: DISCONTINUED | OUTPATIENT
Start: 2018-12-05 | End: 2018-12-05 | Stop reason: HOSPADM

## 2018-12-05 RX ORDER — LIDOCAINE HYDROCHLORIDE 20 MG/ML
INJECTION, SOLUTION INFILTRATION; PERINEURAL PRN
Status: DISCONTINUED | OUTPATIENT
Start: 2018-12-05 | End: 2018-12-05

## 2018-12-05 RX ORDER — MAGNESIUM SULFATE HEPTAHYDRATE 40 MG/ML
2 INJECTION, SOLUTION INTRAVENOUS DAILY PRN
Status: DISCONTINUED | OUTPATIENT
Start: 2018-12-05 | End: 2018-12-18 | Stop reason: HOSPADM

## 2018-12-05 RX ORDER — LIDOCAINE 40 MG/G
CREAM TOPICAL
Status: DISCONTINUED | OUTPATIENT
Start: 2018-12-05 | End: 2018-12-18 | Stop reason: HOSPADM

## 2018-12-05 RX ORDER — PROPOFOL 10 MG/ML
INJECTION, EMULSION INTRAVENOUS
Status: COMPLETED
Start: 2018-12-05 | End: 2018-12-05

## 2018-12-05 RX ORDER — SODIUM CHLORIDE, SODIUM LACTATE, POTASSIUM CHLORIDE, CALCIUM CHLORIDE 600; 310; 30; 20 MG/100ML; MG/100ML; MG/100ML; MG/100ML
INJECTION, SOLUTION INTRAVENOUS CONTINUOUS
Status: DISCONTINUED | OUTPATIENT
Start: 2018-12-05 | End: 2018-12-05 | Stop reason: HOSPADM

## 2018-12-05 RX ORDER — NALOXONE HYDROCHLORIDE 0.4 MG/ML
.1-.4 INJECTION, SOLUTION INTRAMUSCULAR; INTRAVENOUS; SUBCUTANEOUS
Status: DISCONTINUED | OUTPATIENT
Start: 2018-12-05 | End: 2018-12-18 | Stop reason: HOSPADM

## 2018-12-05 RX ORDER — AMOXICILLIN 250 MG
2 CAPSULE ORAL 2 TIMES DAILY
Status: DISCONTINUED | OUTPATIENT
Start: 2018-12-05 | End: 2018-12-18 | Stop reason: HOSPADM

## 2018-12-05 RX ORDER — LEVOFLOXACIN 5 MG/ML
500 INJECTION, SOLUTION INTRAVENOUS SEE ADMIN INSTRUCTIONS
Status: DISCONTINUED | OUTPATIENT
Start: 2018-12-05 | End: 2018-12-05 | Stop reason: CLARIF

## 2018-12-05 RX ORDER — SODIUM CHLORIDE, SODIUM LACTATE, POTASSIUM CHLORIDE, CALCIUM CHLORIDE 600; 310; 30; 20 MG/100ML; MG/100ML; MG/100ML; MG/100ML
INJECTION, SOLUTION INTRAVENOUS CONTINUOUS PRN
Status: DISCONTINUED | OUTPATIENT
Start: 2018-12-05 | End: 2018-12-05

## 2018-12-05 RX ADMIN — DEXMEDETOMIDINE HYDROCHLORIDE 0.4 MCG/KG/HR: 100 INJECTION, SOLUTION INTRAVENOUS at 13:00

## 2018-12-05 RX ADMIN — SODIUM CHLORIDE, POTASSIUM CHLORIDE, SODIUM LACTATE AND CALCIUM CHLORIDE 250 ML: 600; 310; 30; 20 INJECTION, SOLUTION INTRAVENOUS at 15:27

## 2018-12-05 RX ADMIN — FLUCONAZOLE 200 MG: 2 INJECTION, SOLUTION INTRAVENOUS at 08:45

## 2018-12-05 RX ADMIN — FENTANYL CITRATE 150 MCG: 50 INJECTION, SOLUTION INTRAMUSCULAR; INTRAVENOUS at 12:05

## 2018-12-05 RX ADMIN — VASOPRESSIN 1 UNITS: 20 INJECTION INTRAVENOUS at 12:52

## 2018-12-05 RX ADMIN — EPOPROSTENOL SODIUM 20 NG/KG/MIN: 1500000 INJECTION, POWDER, LYOPHILIZED, FOR SOLUTION INTRAVENOUS at 08:14

## 2018-12-05 RX ADMIN — FENTANYL CITRATE 200 MCG: 50 INJECTION, SOLUTION INTRAMUSCULAR; INTRAVENOUS at 14:01

## 2018-12-05 RX ADMIN — PANTOPRAZOLE SODIUM 40 MG: 40 INJECTION, POWDER, FOR SOLUTION INTRAVENOUS at 15:40

## 2018-12-05 RX ADMIN — POTASSIUM CHLORIDE 20 MEQ: 29.8 INJECTION, SOLUTION INTRAVENOUS at 16:14

## 2018-12-05 RX ADMIN — MUPIROCIN 1 G: 20 OINTMENT TOPICAL at 20:09

## 2018-12-05 RX ADMIN — AMINOCAPROIC ACID 5 G: 250 INJECTION, SOLUTION INTRAVENOUS at 08:40

## 2018-12-05 RX ADMIN — EPOPROSTENOL SODIUM 20 NG/KG/MIN: 1500000 INJECTION, POWDER, LYOPHILIZED, FOR SOLUTION INTRAVENOUS at 16:00

## 2018-12-05 RX ADMIN — SODIUM CHLORIDE: 9 INJECTION, SOLUTION INTRAVENOUS at 07:30

## 2018-12-05 RX ADMIN — FENTANYL CITRATE 100 MCG: 50 INJECTION, SOLUTION INTRAMUSCULAR; INTRAVENOUS at 07:48

## 2018-12-05 RX ADMIN — ROCURONIUM BROMIDE 100 MG: 10 INJECTION INTRAVENOUS at 07:48

## 2018-12-05 RX ADMIN — EPINEPHRINE 10 MCG: 1 INJECTION PARENTERAL at 07:48

## 2018-12-05 RX ADMIN — PROPOFOL 20 MCG/KG/MIN: 10 INJECTION, EMULSION INTRAVENOUS at 16:52

## 2018-12-05 RX ADMIN — ALBUMIN HUMAN 500 ML: 50 SOLUTION INTRAVENOUS at 18:48

## 2018-12-05 RX ADMIN — FENTANYL CITRATE 150 MCG: 50 INJECTION, SOLUTION INTRAMUSCULAR; INTRAVENOUS at 11:54

## 2018-12-05 RX ADMIN — LIDOCAINE HYDROCHLORIDE 100 MG: 20 INJECTION, SOLUTION INFILTRATION; PERINEURAL at 07:50

## 2018-12-05 RX ADMIN — POTASSIUM CHLORIDE 20 MEQ: 29.8 INJECTION, SOLUTION INTRAVENOUS at 22:18

## 2018-12-05 RX ADMIN — FENTANYL CITRATE 300 MCG: 50 INJECTION, SOLUTION INTRAMUSCULAR; INTRAVENOUS at 07:51

## 2018-12-05 RX ADMIN — PROTAMINE SULFATE 210 MG: 10 INJECTION, SOLUTION INTRAVENOUS at 12:12

## 2018-12-05 RX ADMIN — EPINEPHRINE 0.07 MCG/KG/MIN: 1 INJECTION PARENTERAL at 07:48

## 2018-12-05 RX ADMIN — ALBUMIN HUMAN 25 G: 0.05 INJECTION, SOLUTION INTRAVENOUS at 19:59

## 2018-12-05 RX ADMIN — HUMAN INSULIN 2 UNITS/HR: 100 INJECTION, SOLUTION SUBCUTANEOUS at 12:30

## 2018-12-05 RX ADMIN — CALCIUM GLUCONATE 2 G: 98 INJECTION, SOLUTION INTRAVENOUS at 23:20

## 2018-12-05 RX ADMIN — LEVOFLOXACIN 500 MG: 5 INJECTION, SOLUTION INTRAVENOUS at 08:45

## 2018-12-05 RX ADMIN — EPINEPHRINE 0.12 MCG/KG/MIN: 1 INJECTION PARENTERAL at 16:42

## 2018-12-05 RX ADMIN — ROCURONIUM BROMIDE 50 MG: 10 INJECTION INTRAVENOUS at 12:06

## 2018-12-05 RX ADMIN — EPOPROSTENOL SODIUM 20 NG/KG/MIN: 1500000 INJECTION, POWDER, LYOPHILIZED, FOR SOLUTION INTRAVENOUS at 15:08

## 2018-12-05 RX ADMIN — SODIUM PHOSPHATE, MONOBASIC, MONOHYDRATE AND SODIUM PHOSPHATE, DIBASIC, ANHYDROUS 15 MMOL: 276; 142 INJECTION, SOLUTION INTRAVENOUS at 18:02

## 2018-12-05 RX ADMIN — ROCURONIUM BROMIDE 20 MG: 10 INJECTION INTRAVENOUS at 14:01

## 2018-12-05 RX ADMIN — AMINOCAPROIC ACID 1 G/HR: 250 INJECTION, SOLUTION INTRAVENOUS at 09:47

## 2018-12-05 RX ADMIN — FENTANYL CITRATE 100 MCG: 50 INJECTION, SOLUTION INTRAMUSCULAR; INTRAVENOUS at 09:26

## 2018-12-05 RX ADMIN — ROCURONIUM BROMIDE 50 MG: 10 INJECTION INTRAVENOUS at 09:20

## 2018-12-05 RX ADMIN — VANCOMYCIN HYDROCHLORIDE 1500 MG: 10 INJECTION, POWDER, LYOPHILIZED, FOR SOLUTION INTRAVENOUS at 19:52

## 2018-12-05 RX ADMIN — Medication 0.5 MG: at 16:46

## 2018-12-05 RX ADMIN — MILRINONE LACTATE 0.38 MCG/KG/MIN: 200 INJECTION, SOLUTION INTRAVENOUS at 19:43

## 2018-12-05 RX ADMIN — SODIUM BICARBONATE 50 MEQ: 84 INJECTION INTRAVENOUS at 19:49

## 2018-12-05 RX ADMIN — EPINEPHRINE 30 MCG: 1 INJECTION PARENTERAL at 12:01

## 2018-12-05 RX ADMIN — CALCIUM CHLORIDE 1000 MG: 100 INJECTION, SOLUTION INTRAVENOUS at 13:52

## 2018-12-05 RX ADMIN — EPOPROSTENOL SODIUM 20 NG/KG/MIN: 1500000 INJECTION, POWDER, LYOPHILIZED, FOR SOLUTION INTRAVENOUS at 19:42

## 2018-12-05 RX ADMIN — ETOMIDATE 16 MG: 2 INJECTION INTRAVENOUS at 07:48

## 2018-12-05 RX ADMIN — SODIUM CHLORIDE 600 MG: 9 INJECTION, SOLUTION INTRAVENOUS at 08:45

## 2018-12-05 RX ADMIN — ALBUMIN HUMAN 500 ML: 0.05 INJECTION, SOLUTION INTRAVENOUS at 18:48

## 2018-12-05 RX ADMIN — POTASSIUM CHLORIDE 20 MEQ: 750 TABLET, EXTENDED RELEASE ORAL at 05:15

## 2018-12-05 RX ADMIN — POTASSIUM CHLORIDE 20 MEQ: 400 INJECTION, SOLUTION INTRAVENOUS at 13:48

## 2018-12-05 RX ADMIN — POTASSIUM CHLORIDE 20 MEQ: 400 INJECTION, SOLUTION INTRAVENOUS at 14:04

## 2018-12-05 RX ADMIN — ROCURONIUM BROMIDE 50 MG: 10 INJECTION INTRAVENOUS at 10:12

## 2018-12-05 RX ADMIN — ALBUMIN HUMAN 25 G: 0.05 INJECTION, SOLUTION INTRAVENOUS at 22:37

## 2018-12-05 RX ADMIN — MAGNESIUM SULFATE 2 G: 2 INJECTION INTRAVENOUS at 23:08

## 2018-12-05 RX ADMIN — Medication 100 MCG/HR: at 17:31

## 2018-12-05 RX ADMIN — NOREPINEPHRINE BITARTRATE 0.03 MCG/KG/MIN: 1 INJECTION INTRAVENOUS at 10:31

## 2018-12-05 RX ADMIN — VASOPRESSIN 5 UNITS: 20 INJECTION INTRAVENOUS at 12:01

## 2018-12-05 RX ADMIN — SENNOSIDES AND DOCUSATE SODIUM 2 TABLET: 8.6; 5 TABLET ORAL at 20:08

## 2018-12-05 RX ADMIN — PROPOFOL 20 MCG/KG/MIN: 10 INJECTION, EMULSION INTRAVENOUS at 23:12

## 2018-12-05 RX ADMIN — SODIUM CHLORIDE, POTASSIUM CHLORIDE, SODIUM LACTATE AND CALCIUM CHLORIDE 250 ML: 600; 310; 30; 20 INJECTION, SOLUTION INTRAVENOUS at 16:00

## 2018-12-05 RX ADMIN — SODIUM CHLORIDE, POTASSIUM CHLORIDE, SODIUM LACTATE AND CALCIUM CHLORIDE 500 ML: 600; 310; 30; 20 INJECTION, SOLUTION INTRAVENOUS at 16:46

## 2018-12-05 RX ADMIN — SODIUM CHLORIDE, POTASSIUM CHLORIDE, SODIUM LACTATE AND CALCIUM CHLORIDE: 600; 310; 30; 20 INJECTION, SOLUTION INTRAVENOUS at 07:48

## 2018-12-05 RX ADMIN — ROCURONIUM BROMIDE 50 MG: 10 INJECTION INTRAVENOUS at 10:50

## 2018-12-05 RX ADMIN — VASOPRESSIN 2.4 UNITS/HR: 20 INJECTION INTRAVENOUS at 11:32

## 2018-12-05 RX ADMIN — VANCOMYCIN HYDROCHLORIDE 1500 MG: 10 INJECTION, POWDER, LYOPHILIZED, FOR SOLUTION INTRAVENOUS at 06:50

## 2018-12-05 ASSESSMENT — ACTIVITIES OF DAILY LIVING (ADL)
ADLS_ACUITY_SCORE: 10
ADLS_ACUITY_SCORE: 9

## 2018-12-05 NOTE — ANESTHESIA PROCEDURE NOTES
Arterial Line Procedure Note  Staff:     Anesthesiologist:  DAPHNEY DUNN    Resident/CRNA:  ANNA MCKEON    Arterial line performed by resident/CRNA in presence of a teaching physician    Location: In OR Before Induction  Procedure Start/Stop Times:      12/5/2018 7:30 AM     12/5/2018 7:40 AM    patient identified, IV checked, site marked, risks and benefits discussed, informed consent, monitors and equipment checked, pre-op evaluation and at physician/surgeon's request      Correct Patient: Yes      Correct Position: Yes      Correct Site: Yes      Correct Procedure: Yes      Correct Laterality:  Yes    Site Marked:  Yes  Line Placement:     Procedure:  Arterial Line    Insertion Site:  Radial    Insertion laterality:  Left    Skin Prep: Chloraprep      Patient Prep: patient draped, mask, sterile gloves, hat and hand hygiene      Local skin infiltration:  1% lidocaine    amount (mL):  2    Ultrasound Guided?: Yes      Artery evaluated via ultrasound confirming patency.   Using realtime imaging, the artery was punctured and the needle was observed entering the artery.      A permanent image is NOT entered into the patient's record.      Catheter size:  20 gauge, 12 cm    Cath secured with: suture      Dressing:  Tegaderm    Complications:  None obvious    Arterial waveform: Yes      IBP within 10% of NIBP: Yes

## 2018-12-05 NOTE — PLAN OF CARE
Problem: Cardiac: Heart Failure (Adult)  Goal: Signs and Symptoms of Listed Potential Problems Will be Absent, Minimized or Managed (Cardiac: Heart Failure)  Signs and symptoms of listed potential problems will be absent, minimized or managed by discharge/transition of care (reference Cardiac: Heart Failure (Adult) CPG).   D/I: 63 year old male with a past medical history of HTN, SHIRLEY, hyperlipidemia, s/p ICD placement on 4/7/17, and NICM, currently on home inotropes.  He presents this admission for acute decompensated heart failure with planned LVAD placement scheduled on 12/5/18. Monitor shows -115 with occasional to frequent PVCs, couplets and triplets. Continues on milrinone drip at 0.125 mcg/kg/min and lasix drip at 10 mg/hour. 450 ml this shift, 2425 ml since MN. Pre-op shower done at 2200, family/friends here up until that time. Up in room and in halls independently. Multiple requests later in the shift. See flowsheets for assessments and additional data.  A: Stable HF on milrinone. Fair UO on lasix.   P: NPO after MN. Continue IV milrinone drip. Continue lasix drip per cross cover provider. Monitor UO on lasix. 2nd pre-op scrub in AM. Plan for OR at 0715 with  between 5-5:30. Continue current cares and plan for VAD placement in AM.    12/04/18 2330   Cardiac: Heart Failure   Problems Assessed (Heart Failure) all   Problems Present (Heart Failure) cardiac pump dysfunction;dysrhythmia/arrhythmia;fluid/electrolyte imbalance;situational response

## 2018-12-05 NOTE — ANESTHESIA PROCEDURE NOTES
Central Line Procedure Note  Staff:     Anesthesiologist:  DAPHNEY DUNN    Resident/CRNA:  ANNA MCKEON    Central line placed by Resident/CRNA in the presence of a teaching physician    Location: In OR after induction  Procedure Start/Stop Times:     patient identified, IV checked, site marked, risks and benefits discussed, informed consent, monitors and equipment checked, pre-op evaluation and at physician/surgeon's request      Correct Patient: Yes      Correct Position: Yes      Correct Site: Yes      Correct Procedure: Yes      Correct Laterality:  Yes    Site Marked:  Yes  Line Placement:     Procedure:  Central Line    Insertion laterality:  Right    Insertion site:  Internal Jugular    Position:  Trendelenburg      Maximal Sterile Barriers: All elements of maximal sterile barrier technique followed      (Maximal sterile barriers include:   Sterile gown, Sterile Gloves, Mask, Cap, Whole body draped, hand hygiene and acceptable skin prep).Skin Prep: Chloraprep         Injection Technique:  Ultrasound guided    Sterile Ultrasound Technique:  Sterile probe cover and Sterile gel    Vein evaluated via U/S for patency/adequacy of catheter insertion and is adequate.  Using realtime U/S imaging the vein was punctured, and needle was observed entering vein on U/S      A permanent image is NOT entered into the patient's record.      Local skin infiltration:  None    Catheter size:  9 Fr, 2 lumen 11.5 cm (MAC)    Catheter length at skin (cm):  15    Cath secured with: suture      Dressing:  Tegaderm and Biopatch    Complications:  None obvious    Blood aspirated all lumens: Yes      All Lumens Flushed: Yes      Verification method:  Ultrasound and Placement to be verified post-op

## 2018-12-05 NOTE — PLAN OF CARE
Problem: Patient Care Overview  Goal: Plan of Care/Patient Progress Review  1. Pt will be hemodynamically stable.  2. Pt and family will verbalize understanding of plan of care.  3. Pt will remain free of falls  4. Pain will be under control or minimal   Outcome: No Change  D: Patient admitted 11/28 for decompensated HF.  Hx. NICM on home milrinone, s/p ICD HTN, SHIRLEY, HLD.  I/A: A&Ox4. VSS on RA. BAJWA. SR/ST 90s-110s. Denies pain. Milrinone infusing at 0.125 mcg/kg/min. Lasix infusing at 10 mg/hr. BG 95 early this AM. Adequate uop. BM x1 last evening. Up independently. Appeared to rest comfortably overnight. AM K+ 3.7 replaced per protocol.  P: NPO since MN for non-invasive HM 3 implant today. Continue to monitor and notify Cards 2-->CVTS with questions/concerns.     Patient transferred to pre-op area at 0550 via litter accompanied by transport support staff and family. Chart sent with patient. Per Cards Cross-cover-ok for patient to go off cardiac monitor to travel.

## 2018-12-05 NOTE — H&P
CV ICU ADMISSION NOTE  12/5/2018    PRIMARY TEAM: CVTS  PRIMARY PHYSICIAN: Dr. Silva    REASON FOR CRITICAL CARE ADMISSION: s/p minimally invasive Heartmate III placement   ADMITTING PHYSICIAN: Dr. James    ASSESSMENT: Danielito Chu is a 63 year-old male with a PMHx s/f SHIRLEY, HTN, CHF (LVEF 15%), NIDDM2 who underwent LVAD placement on 12/5/18 with Dr. Silva.    TODAY'S PROGRESS:  - Arrived from OR at 1430 on Epi/NE/Vaso for pressors in addition to Milrinone  - CPB time 1h18m  - EBL: not documented. UOP: 640  - Cell saver: 891. IVF: 1200. PRBCs: 0, Plts: 0, FFP: 443mL, Cryo: 0, Factors: 0.  - Post-op ARCHANA: RV moderately decompressed, moderate MR, moderate TR, moderate AI.  - Not a candidate for fast-track to extubation.    PLAN:   Neuro/ pain/ sedation:  - Monitor neurological status. Notify the MD for any acute changes in exam.  - Pain: Fentanyl gtt, Oxy/Dilaudid prn, Acetaminophen 975mg TID scheduled, Gabapentin 300mg BID x3 days, Lidoderm.  - Sedation: Precedex gtt     Pulmonary care:   - Supplemental oxygen to keep saturation above 92 %.  - Mechanically ventilated with the following settings: 16/480/100/5  - PST in the AM  #SHIRLEY  - consider extubating to BiPAP  - CPAP overnight as tolerated   #pHTN  - Inhaled flolan     Cardiovascular:    #h/o HTN  #CHF s/p Heartmate III LVAD placement 12/5  - Monitor hemodynamic status.   - Pressors: Epi/NE/Vaso/Milrinone     GI care:   - NPO except ice chips and medications.  - Bowel regimen: Miralax, Senna-Colace     Fluids/ Electrolytes/ Nutrition:   - D5 1/2NS with K for IV fluid hydration  - ICU electrolyte replacement protocol  - No indication for parenteral nutrition.  - Nutrition consulted. Appreciate recs  - q6h BMP     Renal/ Fluid Balance:    - Will monitor intake and output.  - Net even     Endocrine:  #NIDDM2    - Insulin gtt     ID/ Antibiotics:  - Completing a course of periop anti-microbials: Fluconazole, Levaquin, Rifampin, Vancomycin     Heme:     - At  conclusion of case, Hgb:12  - q6h CBC     Prophylaxis:    - Mechanical prophylaxis for DVT.  - No chemical DVT prophylaxis due to high risk of bleeding.     MSK:    - PT and OT consulted. Appreciate recs.     Lines/ tubes/ drains:  - ETT  - RIJ MAC with [place]  - Valentine  - A-line  - CTx4 (left pleural, meds x2, pericardial)  - PIVx1     Disposition:  - CV ICU.    Patient seen, findings and plan discussed with CV ICU staff, Dr. Erika Bird    - - - - - - - - - - - - - - - - - - - - - - - - - - - - - - - - - - - - - - - - - - - - - - - - - - - - - - - - - - - - - - - - - - - - - -    HISTORY PRESENTING ILLNESS: Danielito is a 63-year-old male with a PMHx s/f NIDDM2, HTN, CHF (EF15%) who presented on 12/5/18 for Heartmate III LVAD placement with Dr. Silva. CVICU course has been s/f vasopressor ween.     REVIEW OF SYSTEMS: 10 point ROS neg other than the symptoms noted above in the HPI.    PAST MEDICAL HISTORY:   Past Medical History:   Diagnosis Date     Acute systolic congestive heart failure (H) 4/5/2017     Diabetes (H)      HTN (hypertension)      Hyperlipidemia      Mitral regurgitation      Nocturnal oxygen desaturation 3/29/2018     Nonischemic cardiomyopathy (H) 4/5/2017     SHIRLEY (obstructive sleep apnea)      Pacemaker 04/07/2017    ICD 4/7/17       SURGICAL HISTORY:   Past Surgical History:   Procedure Laterality Date     ESOPHAGOSCOPY, GASTROSCOPY, DUODENOSCOPY (EGD), COMBINED N/A 11/21/2018    Procedure: COMBINED ESOPHAGOSCOPY, GASTROSCOPY, DUODENOSCOPY (EGD);  Surgeon: Candido Mendez MD;  Location:  GI     IMPLANT IMPLANTABLE CARDIOVERTER DEFIBRILLATOR         SOCIAL HISTORY:   Social History     Social History     Marital status: Single     Spouse name: N/A     Number of children: N/A     Years of education: N/A     Social History Main Topics     Smoking status: Former Smoker     Smokeless tobacco: Never Used      Comment: quit 3/2017     Alcohol use None      Comment: social     Drug  use: No     Sexual activity: Not Asked     Other Topics Concern     None     Social History Narrative       ALLERGIES:    No Known Allergies    MEDICATIONS:    No current facility-administered medications on file prior to encounter.   Current Outpatient Prescriptions on File Prior to Encounter:  artificial saliva (BIOTENE DRY MOUTHWASH) LIQD liquid Swish and spit 10 mLs in mouth 4 times daily as needed for dry mouth   aspirin EC 81 MG EC tablet Take 1 tablet (81 mg) by mouth daily   atorvastatin (LIPITOR) 10 MG tablet Take 1 tablet (10 mg) by mouth daily   Blood Glucose Monitoring Suppl (BLOOD GLUCOSE MONITOR SYSTEM) w/Device KIT three times a week   hydrALAZINE (APRESOLINE) 25 MG tablet Take 3 tablets (75 mg) by mouth 3 times daily   isosorbide dinitrate (ISORDIL) 20 MG tablet Take 1 tablet (20 mg) by mouth 3 times daily   milrinone (PRIMACOR) infusion 200 mcg/mL PREMIX Inject 10.3 mcg/min into the vein continuous   omeprazole (PRILOSEC) 20 MG CR capsule Take 1 capsule (20 mg) by mouth every morning (before breakfast)   potassium chloride ER (K-TAB) 20 MEQ ER tablet Take 2 tablets (40 mEq) by mouth 2 times daily   thiamine 100 MG tablet Take 1 tablet (100 mg) by mouth daily   torsemide (DEMADEX) 20 MG tablet Take 2-4 tablets (40-80 mg) by mouth 2 times daily Take 80 mg in the morning and 40 mg in the afternoon   traZODone (DESYREL) 100 MG tablet TAKE 1 TABLET BY MOUTH AT BEDTIME AS NEEDED FOR SLEEP       PHYSICAL EXAMINATION:  Temp:  [97.3  F (36.3  C)-97.8  F (36.6  C)] 97.5  F (36.4  C)  Pulse:  [101-103] 103  Heart Rate:  [100-106] 101  Resp:  [16-18] 16  BP: ()/(62-80) 101/62  SpO2:  [95 %-100 %] 100 %    General: adult male lying supine in bed  Neuro: sedated and intubated  Resp: Breathing non-labored on mechanical ventilation  CV: RRR  Abdomen: Soft, Non-distended, Non-tender  Incisions: c/d/i  Extremities: warm and well perfused    LABS: Reviewed.   Arterial Blood Gases     Recent Labs  Lab  12/05/18  1333 12/05/18  1218 12/05/18  1128 12/05/18  0849   PH 7.35 7.35 7.33* 7.39   PCO2 38 38 46* 37   PO2 324* 330* 317* 155*   HCO3 21 21 24 22     Complete Blood Count     Recent Labs  Lab 12/05/18  1333 12/05/18  1220 12/05/18  1218 12/05/18  1128 12/05/18  1052  12/05/18  0430 12/03/18  0624 12/01/18  0555 11/29/18  0515   WBC  --   --   --   --   --   --  7.1 8.5 9.6 10.6   HGB 12.2*  --  11.6* 12.1* 9.9*  < > 14.5 14.6 15.3 15.5   PLT  --  116*  --   --   --   --  170 180 171 187   < > = values in this interval not displayed.  Basic Metabolic Panel    Recent Labs  Lab 12/05/18  1333 12/05/18  1218 12/05/18  1128 12/05/18  1052  12/05/18  0430 12/04/18  1730 12/04/18  0800 12/03/18  1850    137 137 138  < > 138 135 137 134   POTASSIUM 3.0* 3.7 4.2 3.8  < > 3.7 3.7 3.2* 3.6   CHLORIDE  --   --   --   --   --  102 101 101 100   CO2  --   --   --   --   --  24 26 25 24   BUN  --   --   --   --   --  21 23 24 30   CR  --   --   --   --   --  1.17 1.12 1.17 1.35*   * 200* 179* 156*  < > 96 98 92 144*   < > = values in this interval not displayed.  Liver Function Tests    Recent Labs  Lab 12/05/18  1220 12/04/18  0800 12/03/18  1850 11/30/18  2349 11/30/18  0430   AST  --  14 12 15 17   ALT  --  13 14 15 15   ALKPHOS  --  97 99 90 94   BILITOTAL  --  2.0* 2.2* 2.9* 3.5*   ALBUMIN  --  3.2* 3.2* 3.3* 3.5   INR 2.03*  --   --   --  1.40*     Pancreatic Enzymes    Recent Labs  Lab 11/30/18  1130   LIPASE 51*   AMYLASE 20*     Coagulation Profile    Recent Labs  Lab 12/05/18  1220 11/30/18  0430   INR 2.03* 1.40*   PTT 35  --        IMAGING:  No results found for this or any previous visit (from the past 24 hour(s)).

## 2018-12-05 NOTE — ANESTHESIA CARE TRANSFER NOTE
Patient: Danielito Chu    Procedure(s):  Partial Median Sternotomy, Left Thoracotomy, Minimally Invasive Left Ventricular Assist Device Placement (Heartmate III), On Cardiopulmonary Bypass    Diagnosis: Heart Failure   Diagnosis Additional Information: No value filed.    Anesthesia Type:   No value filed.     Note:  Airway :ETT  Patient transferred to:ICU  ICU Handoff: Call for PAUSE to initiate/utilize ICU HANDOFF, Identified Patient, Identified Responsible Provider, Reviewed the Pertinent Medical History, Discussed Surgical Course, Reviewed Intra-OP Anesthesia Management and Issues during Anesthesia, Set Expectations for Post Procedure Period and Allowed Opportunity for Questions and Acknowledgement of Understanding      Vitals: (Last set prior to Anesthesia Care Transfer)    CRNA VITALS  12/5/2018 1355 - 12/5/2018 1455      12/5/2018             ART BP: (!)  88/70    Ht Rate: 89                Electronically Signed By: Serge Henderson MD  December 5, 2018  3:02 PM

## 2018-12-05 NOTE — ANESTHESIA PROCEDURE NOTES
ARCHANA Probe Insertion Note:    Inserted by:  DAPHNEY DUNN (Responsible Anesthesiologist)                        JUAN J HUFF (in the presence of a teaching physician )  Probe Number: [id number]  Probe Status PRE Insertion: NO obvious damage  Probe type:  Adult 3D    Bite block used:   Yes  Insertion Technique: Jaw Lift  Insertion complications: None obvious    Billing Report:ARCHANA report by Anesthesiologist (See Separate Report note)    Probe Status POST Removal: NO obvious damage

## 2018-12-05 NOTE — MR AVS SNAPSHOT
After Visit Summary   12/5/2018    Danielito Chu    MRN: [id number]           Patient Information     Date Of Birth          1955        Visit Information        Provider Department      12/5/2018 6:00 AM UC CV DEVICE 2 [facility]        Today's Diagnoses     Nonischemic cardiomyopathy (H)    -  1       Follow-ups after your visit        Your next 10 appointments already scheduled     Dec 10, 2018  4:30 PM CST   (Arrive by 4:15 PM)   Hospital Follow Up with Candido Pedroza MD   [facility] ([facility])    [address] [place] [address]  [address]  [facility] [address]   [phone]            Jan 03, 2019  9:00 AM CST   Lab with UC LAB   [facility] ([facility])    [address] [place] [address]  1st Floor  [facility] [id number] [phone]            Jan 03, 2019  9:30 AM CST   (Arrive by 9:15 AM)   RETURN HEART FAILURE with Lorena Watkins MD   [facility] ([facility])    [address] [place] [address]  [address]  [facility] [id number] [phone]            Jan 03, 2019  9:30 AM CST   (Arrive by 9:15 AM)   CARDIAC DEVICE CHECK - IN CLINIC with UC CV DEVICE 1   [facility] ([facility])    [address] [place] [address]  3rd Floor  [facility] [id number] [phone]              Who to contact     If you have questions or need follow up information about today's clinic visit or your schedule please contact [facility] directly at [phone].  Normal or non-critical lab and imaging results will be communicated to you by MyChart, letter or phone within 4 business days after the clinic has received the results. If you do not hear from us within 7 days, please contact the clinic through MyChart or phone. If you have a critical or abnormal lab result, we will notify you by phone as soon as possible.  Submit refill requests through [employer] or call  your pharmacy and they will forward the refill request to us. Please allow 3 business days for your refill to be completed.          Additional Information About Your Visit        Care EveryWhere ID     This is your Care EveryWhere ID. This could be used by other organizations to access your [facility] medical records  [id number]         Blood Pressure from Last 3 Encounters:   12/05/18 101/62   11/26/18 104/71   11/21/18 116/80    Weight from Last 3 Encounters:   12/05/18 81.9 kg (180 lb 9.6 oz)   11/26/18 83.5 kg (184 lb)   11/21/18 80.2 kg (176 lb 11.2 oz)              We Performed the Following     PRISCA-PROC DEVICE EVAL/PROGRAMMING, ICD          Today's Medication Changes      Notice     This visit is during an admission. Changes to the med list made in this visit will be reflected in the After Visit Summary of the admission.             Primary Care Provider Office Phone # Fax #    Bryan Orellana [phone]       [place] [address]        Equal Access to Services     [facility]: Hadii aad ku hadasho Soomaali, waaxda luqadaha, qaybta kaalmada adeegyada, aaron nelson . So [facility] [phone].    ATENCIÓN: Si habla español, tiene a stokes disposición servicios gratuitos de asistencia lingüística. Llame al [phone].    We comply with applicable federal civil rights laws and [place] laws. We do not discriminate on the basis of race, color, national origin, age, disability, sex, sexual orientation, or gender identity.            Thank you!     Thank you for choosing [facility]  for your care. Our goal is always to provide you with excellent care. Hearing back from our patients is one way we can continue to improve our services. Please take a few minutes to complete the written survey that you may receive in the mail after your visit with us. Thank you!             Your Updated Medication List - Protect others around  you: Learn how to safely use, store and throw away your medicines at www.disposemymeds.org.      Notice     This visit is during an admission. Changes to the med list made in this visit will be reflected in the After Visit Summary of the admission.

## 2018-12-05 NOTE — ANESTHESIA PROCEDURE NOTES
Perioperative ARCHANA Report  Anesthesia Information  ARCHANA probe placed and report generated by:   JUAN SINGER in the presence of a teaching physician :  DAPHNEY DUNN    I personally reviewed the images and the fellow/resident interpretation.  I agree with the fellow/resident interpretation as amended by myself. DAPHNEY DUNN  Images for this study have been archived.   Surgeon:  ALEXX MARIA      Preanesthesia Checklist:  Patient identified, IV assessed, risks and benefits discussed, monitors and equipment assessed, procedure being performed at surgeon's request and anesthesia consent obtained.  General Procedure Information  Modalities:  2D, 3D, CW Doppler, PW Doppler and Color flow mapping    LVAD HeartMate 3 placement  Echocardiographic and Doppler Measurements  Right Ventricle:  Cavity size dilated.   Diastolic dimension 5.3 cm.   Hypertrophy not present.   Thrombus not present.    Global function moderately impaired.     Left Ventricle:  Cavity size dilated.   Diastolic dimension 6.6 cm.   Hypertrophy not present.   Thrombus not present.   Global Function severely impaired.   Ejection Fraction 15%.    Other Ventricular Findings:  LV is severely dilated in size, with severely depressed systolic function   LVEF: 10-15% by visual estimation     RV is severely dilated in size with moderately decreased systolic function. TAPSE 9mm   Ventricular Regional Function:  Wall Motion Comments:  Globally hypokinesis and inferior wall akinesis.   Valves  Aortic Valve: Annulus calcified.  Stenosis mild.  Mean Gradient: 8 mmHg.  Pressure Half-time: 332 ms.  Regurgitation +1.  Leaflets calcified and bicuspid.  Leaflet motions restricted.    Mitral Valve: Annulus dilated.  Stenosis not present.  Regurgitation +2.  Leaflets normal.  Leaflet motions restricted.    Tricuspid Valve: Annulus dilated.  Regurgitation +2.  Leaflets normal.  Leaflet motions restricted.      Other Valve Findings:   Severely calcified aortic valve with bicuspid valve (fusion of RCC and LCC). Restricted movement of the R/L leaflet   Moderate aortic insufficiency with central jet. Color jet/LVOT width ratio 21%.  msec. Vena contract 0.4 cm.  There is no diastolic flow reversal in the descending aorta   Mild aortic stenosis. Mean gradient 8 mmHg.    Mild mitral regurgitation with central jet. Dilated annulus with restricted leaflet motion.   MV vena contracta 0.31 cm. EROA 0.15 cm2. MR volume 15 mL.  No mitral stenosis, mean gradient 2 mmHg at 99 bpm.   Moderate tricuspid regurgitation with PAC and ICD lead traversing valve.   No hepatic vein S wave reversal.   Pulmonic valve not well visualized but appears competent.   Aorta: Ascending Aorta: Size normal.  Dissection not present.  Plaque thickness less than 3 mm.  Mobile plaque not present.    Aortic Arch: Size normal.   Dissection not present.   Plaque thickness less than 3 mm.   Mobile plaque not present.    Descending Aorta: Size normal.   Dissection not present.   Plaque thickness less than 3 mm.   Mobile plaque not present.        Right Atrium:  Size dilated.   Spontaneous echo contrast not present.   Thrombus not present.   Tumor not present.   Device present.   Left Atrium: Size dilated.  Spontaneous echo contrast not present.  Thrombus not present.  Tumor not present.  Device not present.    Left atrial appendage normal.   Other Atria Findings:  ICD lead visualized in right atrium.   Atrial Septum: Intra-atrial septal morphology normal.   Other atrial septal defect findings:  Negative for PFO by color doppler and bubble study.   Ventricular Septum: Intra-ventricular septum morphology normal.       Other Findings:   Pericardium:  normal.  Pleural Effusion:  none. Pulmonary Arteries:  normal.  Pulmonary Venous Flow:  normal.  .  .  Post Intervention Findings  Procedure(s) performed:  LVAD Placement. Global function:  Unchanged.   Regional wall motion:  Unchanged    Surgeon(s) notified of all postintervention findings:  Yes  .  .  .   .  .  .  .  .  .  LVAD Placement:  LV decompressed.  PFO not present (Negative for PFO by color doppler and bubble study).  Aortic valve opening: Aortic valve intermittently opening but given heavy calcium burden difficult to visualize..    S/p LVAD (HeartMate3) placement. LVAD inflow cannula looks appropriately positioned with orifice directed towards MV inflow and parallel to septum.   Normal inflow and outflow cannula velocities.   Interventricular septum is midline at 5000 rpm  On 0.2 mcg/kg/min IV epinephrine, 0.1 mcg/kg/min IV norepinephrine, 0.375 mcg/kg/min IV milrinone and 1.2 units/hr IV vasopressin the LV function appears unchanged.   The RV function is improved, now mildly depressed and more decompressed.   Moderate aortic insufficiency now present. Unable to quantify with objective measures due to LVAD outflow cannula flow.    Mild mitral regurgitation.   No evidence of aortic aneurysm or dissection  No change in tricuspid regurgitation (moderate).     Echocardiogram Comments

## 2018-12-05 NOTE — OR NURSING
Urinary catheter insertion with resistence using 16Fr temp probe catheter. Then successful insertion of 16Fr Coude, did have urine output, only instilled 10ml water in balloon.  During prep >30min after insertion note no urine. Catheter irrigated with 30ml saline with no resistence and flow back of yellow fluid.  Balloon re-inflated w/10ml

## 2018-12-05 NOTE — PROGRESS NOTES
Pt arrived on 4E @ 1440 after LVAD insertion.  Ventilator settings in Resp Care Flowsheet.  8.0 ETT secured @ 24cm @ lip.   BS - clear t/o.  No suctioning req'd at this time.  RT to follow

## 2018-12-05 NOTE — OP NOTE
DATE OF SURGERY:  12/5/2018     PREOPERATIVE DIAGNOSIS:    1) Dilated cardiomyopathy.    2) Congestive heart failure   3) Nonischemic cardiomyopathy  4) S/P cardiac defibrillator  5) Nocturnal oxygen desaturation    POSTOPERATIVE DIAGNOSIS:    1)  Dilated cardiomyopathy.    2) Congestive heart failure   3) Nonischemic cardiomyopathy  4) S/P cardiac defibrillator  5) Nocturnal oxygen desaturation     PROCEDURE:    1) Minimally invasive placement of HeartMate 3 left ventricular assist device (intracorporeal left ventricular assist device.) - upper hemisternotomy, left anterior thoracotomy  2) Transesophageal echocardiogram     SURGEON:  Dr. Andrei Silva MD     CO-SURGEON:  Dr. Bhavin Wilcox MD  Note, a second attending surgeon was required for the operation because of the complexity of the operation.       ASSISTANT:  Benoit Jalloh PA-C     OPERATIVE INDICATIONS:  The patient is a 63-year-old male with severe dilated cardiomyopathy.  A decision made to proceed with HeartMate 3 left ventricular assist device placement.  I discussed the risks and benefits of surgery with the patient and the family, including the risk of death, stroke, bleeding, wound infection, renal failure and arrhythmias.  They understand and are willing to proceed with surgery.     OPERATIVE FINDINGS:    1) Patient's sternum was of adequate quality.    2) There was severe left atrial dilatation.    3) There was moderate to severe RV dysfunction.    4) There was no tricuspid regurgitation.      TOTAL CARDIOPULMONARY BYPASS TIME: 77 minutes     DESCRIPTION OF OPERATION:  Patient was brought to the operating room in stable condition.  Following the admission general anesthesia, the patient's chest, abdomen and lower extremities were prepped and draped in the usual sterile fashion.  A upper median lashanda-sternotomy was performed.  Next a left anterior thoracotomy was made in the 6th intercostal space.  The 6th rib was shingled with .  Michael  retractors were placed at both sites.  The pericardium was opened and tented with pericardial stitches at both sites.      The driveline was tunneled out the left upper quadrant of the abdomen.      Preparation for cardiopulmonary bypass including ACT-guided heparinization and the administration of Amicar.  The aorta was cannulated with a 18-Syriac EOPA arterial cannula via 3-0 Tevdek pursestring pledgeted suture x2.  Three stage venous cannula was inserted in the right atrium.  A full cardiac bypass was initiated.      We focused on the inflow cannula first via the left thoracotomy incision.  The inflow ring was sewn to the apex of the heart in the usual standard fashion.  Coring knife was used to core out a portion of the apex of the left ventricle.  Inflow cannula was inserted in the heart and secured in the usual standard fashion.      Next the outflow graft was passed to the upper hemisternotomy incision.  Outflow graft was measured and sewn to a longitudinal aortotomy using continuous 4-0 Prolene suture.      De-airing maneuvers were performed in the usual standard fashion.  Following confirmation of de-airing by echocardiography, the patient was gradually weaned off cardiopulmonary bypass using low-dose epinephrine and Levophed.  The LVAD pump was also initiated.  Initial hemodynamics were stable.  Protamine was given.  All cannulas removed.  Careful hemostasis was obtained.     The exposed portion of the outflow graft and the driveline was wrapped with Gortex graft.  The pump was covered with a Gortex sheet via the left thoractomy incision.  Two anterior mediastinal chest tubes and a left pleural chest tube was placed.  A 24F sherin drain was placed advacent to the LVAD.  The thoracotomy was closed with pericostal stitches.  The fascia, subcutaneous tissue and skin were closed in layers.  The sternum was closed with surgical steel wires.  Fascia, subcutaneous tissue and skin of the chest were closed in  layers.  The patient is transferred to intensive care unit in stable but critical condition.      All needle, sponge, and instrument counts were correct.    ALEXX MARIA MD

## 2018-12-05 NOTE — PROGRESS NOTES
Preliminary Device Interrogation Results.  Final physician signed paceart report to be scanned and attached.    Patient seen in [facility] Pre-Op for evaluation and iterative programming of [employer] Dynagen single lead ICD per MD orders.  Patient is scheduled for LVAD surgery today.  Normal ICD function. 2 nonsustained ventricular high rate episodes recorded. The December 3rd episode lasting 6 seconds @ 180 bpm displays an EGM that shows similar morphology to intrinsic and Rhythm Match saw correlatability 96-98 % so likely a SVT. The Dec 1st episode, NSVT lasting 4 seconds @ 185 bpm displays a change in morphology. Intrinsic rhythm = SR @  bpm.  = 0%. Estimated battery longevity to LINCOLN = 12 years. Ordered by anesthesia, device programmed with Tachy Therapies off, pacing left at VVI 40 bpm.    single lead ICD

## 2018-12-05 NOTE — ANESTHESIA PROCEDURE NOTES
PA Catheter Insertion Note  Anesthesiologist: DAPHNEY DUNN  Resident:  ANNA MCKEON   PA Catheter placed by resident/CRNA in the presence of a teaching physician.  Introducer: Introducer placed as part of procedure (SEE separate note)   Skin prep:  Chloraprep Cap, hand hygiene, Sterile gloves, Mask, Sterile gown and Full body drape    PA Catheter type:  CCO    Distance catheter advanced:  65 cm.    Appropriate RA, RV, PA  waveforms?: Yes    Dressing:  Biopatch    Complications:  None apparent

## 2018-12-05 NOTE — PROGRESS NOTES
LVAD Social Work Services Progress Note      Date of Initial Social Work Evaluation: 10/23/18. Admission assessment done Friday 11/30/18  Collaborated with: Sister, Sig Other, Brother    Data: Fabiano went to the OR this morning to have VAD implanted. Family was in the OR waiting lounge. They report that they have been given great news about Fabiano being able to tolerate the minimally invasive procedure for VAD implant and was doing well. They verified they really want an [employer].  Intervention: Met with Sister and Sig Other. Discussed lodging options and process for getting into [employer]. Inquired into other issues/concerns. Assessed coping.  Assessment: Family seemed to be in bright spirits today. Good coping skills. Wants an [address] apartment, which looks to be available in the next day or 2. SW will continue to follow for ongoing psychosocial issues post-VAD.  Education provided by SW: Process of getting into apartment  Plan:    Discharge Plans in Progress: TBD    Barriers to d/c plan: Just had LVAD placed in OR today    Follow up Plan: SW will continue to follow during this hospital stay

## 2018-12-06 ENCOUNTER — APPOINTMENT (OUTPATIENT)
Dept: GENERAL RADIOLOGY | Facility: CLINIC | Age: 63
DRG: 001 | End: 2018-12-06
Attending: THORACIC SURGERY (CARDIOTHORACIC VASCULAR SURGERY)
Payer: COMMERCIAL

## 2018-12-06 ENCOUNTER — DOCUMENTATION ONLY (OUTPATIENT)
Dept: CARDIOLOGY | Facility: CLINIC | Age: 63
End: 2018-12-06

## 2018-12-06 LAB
ANION GAP SERPL CALCULATED.3IONS-SCNC: 7 MMOL/L (ref 3–14)
ANION GAP SERPL CALCULATED.3IONS-SCNC: 8 MMOL/L (ref 3–14)
ANION GAP SERPL CALCULATED.3IONS-SCNC: 8 MMOL/L (ref 3–14)
ANION GAP SERPL CALCULATED.3IONS-SCNC: 9 MMOL/L (ref 3–14)
ANION GAP SERPL CALCULATED.3IONS-SCNC: 9 MMOL/L (ref 3–14)
APTT PPP: 38 SEC (ref 22–37)
BASE DEFICIT BLDA-SCNC: 0.4 MMOL/L
BASE DEFICIT BLDV-SCNC: 3.9 MMOL/L
BASE EXCESS BLDA CALC-SCNC: 0.1 MMOL/L
BASE EXCESS BLDA CALC-SCNC: 2 MMOL/L
BASE EXCESS BLDV CALC-SCNC: 2.5 MMOL/L
BASE EXCESS BLDV CALC-SCNC: 2.7 MMOL/L
BASE EXCESS BLDV CALC-SCNC: 3.7 MMOL/L
BUN SERPL-MCNC: 11 MG/DL (ref 7–30)
BUN SERPL-MCNC: 13 MG/DL (ref 7–30)
BUN SERPL-MCNC: 13 MG/DL (ref 7–30)
BUN SERPL-MCNC: 15 MG/DL (ref 7–30)
BUN SERPL-MCNC: 18 MG/DL (ref 7–30)
CA-I BLD-MCNC: 4.1 MG/DL (ref 4.4–5.2)
CA-I BLD-MCNC: 4.6 MG/DL (ref 4.4–5.2)
CA-I BLD-MCNC: 4.7 MG/DL (ref 4.4–5.2)
CALCIUM SERPL-MCNC: 7.1 MG/DL (ref 8.5–10.1)
CALCIUM SERPL-MCNC: 7.8 MG/DL (ref 8.5–10.1)
CALCIUM SERPL-MCNC: 8.1 MG/DL (ref 8.5–10.1)
CALCIUM SERPL-MCNC: 8.5 MG/DL (ref 8.5–10.1)
CALCIUM SERPL-MCNC: 8.6 MG/DL (ref 8.5–10.1)
CHLORIDE SERPL-SCNC: 109 MMOL/L (ref 94–109)
CHLORIDE SERPL-SCNC: 109 MMOL/L (ref 94–109)
CHLORIDE SERPL-SCNC: 111 MMOL/L (ref 94–109)
CHLORIDE SERPL-SCNC: 112 MMOL/L (ref 94–109)
CHLORIDE SERPL-SCNC: 113 MMOL/L (ref 94–109)
CO2 SERPL-SCNC: 22 MMOL/L (ref 20–32)
CO2 SERPL-SCNC: 24 MMOL/L (ref 20–32)
CO2 SERPL-SCNC: 25 MMOL/L (ref 20–32)
CO2 SERPL-SCNC: 25 MMOL/L (ref 20–32)
CO2 SERPL-SCNC: 26 MMOL/L (ref 20–32)
COPATH REPORT: NORMAL
CREAT SERPL-MCNC: 0.78 MG/DL (ref 0.66–1.25)
CREAT SERPL-MCNC: 0.88 MG/DL (ref 0.66–1.25)
CREAT SERPL-MCNC: 0.88 MG/DL (ref 0.66–1.25)
CREAT SERPL-MCNC: 0.91 MG/DL (ref 0.66–1.25)
CREAT SERPL-MCNC: 1 MG/DL (ref 0.66–1.25)
ERYTHROCYTE [DISTWIDTH] IN BLOOD BY AUTOMATED COUNT: 18 % (ref 10–15)
ERYTHROCYTE [DISTWIDTH] IN BLOOD BY AUTOMATED COUNT: 18.3 % (ref 10–15)
ERYTHROCYTE [DISTWIDTH] IN BLOOD BY AUTOMATED COUNT: 18.4 % (ref 10–15)
ERYTHROCYTE [DISTWIDTH] IN BLOOD BY AUTOMATED COUNT: 18.6 % (ref 10–15)
ERYTHROCYTE [DISTWIDTH] IN BLOOD BY AUTOMATED COUNT: 18.6 % (ref 10–15)
ERYTHROCYTE [DISTWIDTH] IN BLOOD BY AUTOMATED COUNT: 18.7 % (ref 10–15)
FIBRINOGEN PPP-MCNC: 279 MG/DL (ref 200–420)
GFR SERPL CREATININE-BSD FRML MDRD: 76 ML/MIN/1.7M2
GFR SERPL CREATININE-BSD FRML MDRD: 85 ML/MIN/1.7M2
GFR SERPL CREATININE-BSD FRML MDRD: 88 ML/MIN/1.7M2
GFR SERPL CREATININE-BSD FRML MDRD: 88 ML/MIN/1.7M2
GFR SERPL CREATININE-BSD FRML MDRD: >90 ML/MIN/1.7M2
GLUCOSE BLDC GLUCOMTR-MCNC: 100 MG/DL (ref 70–99)
GLUCOSE BLDC GLUCOMTR-MCNC: 104 MG/DL (ref 70–99)
GLUCOSE BLDC GLUCOMTR-MCNC: 105 MG/DL (ref 70–99)
GLUCOSE BLDC GLUCOMTR-MCNC: 105 MG/DL (ref 70–99)
GLUCOSE BLDC GLUCOMTR-MCNC: 112 MG/DL (ref 70–99)
GLUCOSE BLDC GLUCOMTR-MCNC: 116 MG/DL (ref 70–99)
GLUCOSE BLDC GLUCOMTR-MCNC: 75 MG/DL (ref 70–99)
GLUCOSE BLDC GLUCOMTR-MCNC: 85 MG/DL (ref 70–99)
GLUCOSE BLDC GLUCOMTR-MCNC: 99 MG/DL (ref 70–99)
GLUCOSE BLDC GLUCOMTR-MCNC: 99 MG/DL (ref 70–99)
GLUCOSE SERPL-MCNC: 100 MG/DL (ref 70–99)
GLUCOSE SERPL-MCNC: 105 MG/DL (ref 70–99)
GLUCOSE SERPL-MCNC: 117 MG/DL (ref 70–99)
GLUCOSE SERPL-MCNC: 135 MG/DL (ref 70–99)
GLUCOSE SERPL-MCNC: 99 MG/DL (ref 70–99)
HCO3 BLD-SCNC: 24 MMOL/L (ref 21–28)
HCO3 BLD-SCNC: 24 MMOL/L (ref 21–28)
HCO3 BLD-SCNC: 26 MMOL/L (ref 21–28)
HCO3 BLDV-SCNC: 20 MMOL/L (ref 21–28)
HCO3 BLDV-SCNC: 27 MMOL/L (ref 21–28)
HCO3 BLDV-SCNC: 28 MMOL/L (ref 21–28)
HCO3 BLDV-SCNC: 28 MMOL/L (ref 21–28)
HCT VFR BLD AUTO: 25.1 % (ref 40–53)
HCT VFR BLD AUTO: 27.6 % (ref 40–53)
HCT VFR BLD AUTO: 29.8 % (ref 40–53)
HCT VFR BLD AUTO: 31.7 % (ref 40–53)
HCT VFR BLD AUTO: 32.7 % (ref 40–53)
HCT VFR BLD AUTO: 33.2 % (ref 40–53)
HGB BLD-MCNC: 10.1 G/DL (ref 13.3–17.7)
HGB BLD-MCNC: 10.2 G/DL (ref 13.3–17.7)
HGB BLD-MCNC: 10.3 G/DL (ref 13.3–17.7)
HGB BLD-MCNC: 7.8 G/DL (ref 13.3–17.7)
HGB BLD-MCNC: 8.5 G/DL (ref 13.3–17.7)
HGB BLD-MCNC: 9.2 G/DL (ref 13.3–17.7)
INR PPP: 1.81 (ref 0.86–1.14)
INR PPP: 2.09 (ref 0.86–1.14)
INTERPRETATION ECG - MUSE: NORMAL
LACTATE BLD-SCNC: 1.1 MMOL/L (ref 0.7–2)
LACTATE BLD-SCNC: 1.5 MMOL/L (ref 0.7–2)
LACTATE BLD-SCNC: 1.9 MMOL/L (ref 0.7–2)
LMWH PPP CHRO-ACNC: <0.1 IU/ML
MAGNESIUM SERPL-MCNC: 2 MG/DL (ref 1.6–2.3)
MAGNESIUM SERPL-MCNC: 2.1 MG/DL (ref 1.6–2.3)
MAGNESIUM SERPL-MCNC: 2.5 MG/DL (ref 1.6–2.3)
MCH RBC QN AUTO: 30.1 PG (ref 26.5–33)
MCH RBC QN AUTO: 30.3 PG (ref 26.5–33)
MCH RBC QN AUTO: 30.3 PG (ref 26.5–33)
MCH RBC QN AUTO: 30.5 PG (ref 26.5–33)
MCH RBC QN AUTO: 30.6 PG (ref 26.5–33)
MCH RBC QN AUTO: 30.7 PG (ref 26.5–33)
MCHC RBC AUTO-ENTMCNC: 30.8 G/DL (ref 31.5–36.5)
MCHC RBC AUTO-ENTMCNC: 30.9 G/DL (ref 31.5–36.5)
MCHC RBC AUTO-ENTMCNC: 31 G/DL (ref 31.5–36.5)
MCHC RBC AUTO-ENTMCNC: 31.1 G/DL (ref 31.5–36.5)
MCHC RBC AUTO-ENTMCNC: 31.2 G/DL (ref 31.5–36.5)
MCHC RBC AUTO-ENTMCNC: 31.9 G/DL (ref 31.5–36.5)
MCV RBC AUTO: 96 FL (ref 78–100)
MCV RBC AUTO: 98 FL (ref 78–100)
MCV RBC AUTO: 99 FL (ref 78–100)
O2/TOTAL GAS SETTING VFR VENT: 40 %
OXYHGB MFR BLD: 94 % (ref 92–100)
OXYHGB MFR BLD: 96 % (ref 92–100)
OXYHGB MFR BLD: 97 % (ref 92–100)
OXYHGB MFR BLDV: 62 %
OXYHGB MFR BLDV: 63 %
OXYHGB MFR BLDV: 63 %
OXYHGB MFR BLDV: 64 %
PCO2 BLD: 32 MM HG (ref 35–45)
PCO2 BLD: 35 MM HG (ref 35–45)
PCO2 BLD: 35 MM HG (ref 35–45)
PCO2 BLDV: 32 MM HG (ref 40–50)
PCO2 BLDV: 40 MM HG (ref 40–50)
PCO2 BLDV: 42 MM HG (ref 40–50)
PCO2 BLDV: 45 MM HG (ref 40–50)
PH BLD: 7.44 PH (ref 7.35–7.45)
PH BLD: 7.47 PH (ref 7.35–7.45)
PH BLD: 7.47 PH (ref 7.35–7.45)
PH BLDV: 7.4 PH (ref 7.32–7.43)
PH BLDV: 7.41 PH (ref 7.32–7.43)
PH BLDV: 7.44 PH (ref 7.32–7.43)
PH BLDV: 7.44 PH (ref 7.32–7.43)
PHOSPHATE SERPL-MCNC: 2.9 MG/DL (ref 2.5–4.5)
PHOSPHATE SERPL-MCNC: 3.2 MG/DL (ref 2.5–4.5)
PHOSPHATE SERPL-MCNC: 3.5 MG/DL (ref 2.5–4.5)
PLATELET # BLD AUTO: 102 10E9/L (ref 150–450)
PLATELET # BLD AUTO: 102 10E9/L (ref 150–450)
PLATELET # BLD AUTO: 80 10E9/L (ref 150–450)
PLATELET # BLD AUTO: 85 10E9/L (ref 150–450)
PLATELET # BLD AUTO: 86 10E9/L (ref 150–450)
PLATELET # BLD AUTO: 99 10E9/L (ref 150–450)
PO2 BLD: 116 MM HG (ref 80–105)
PO2 BLD: 83 MM HG (ref 80–105)
PO2 BLD: 95 MM HG (ref 80–105)
PO2 BLDV: 34 MM HG (ref 25–47)
POTASSIUM SERPL-SCNC: 3.4 MMOL/L (ref 3.4–5.3)
POTASSIUM SERPL-SCNC: 3.9 MMOL/L (ref 3.4–5.3)
POTASSIUM SERPL-SCNC: 4.1 MMOL/L (ref 3.4–5.3)
POTASSIUM SERPL-SCNC: 4.2 MMOL/L (ref 3.4–5.3)
POTASSIUM SERPL-SCNC: 4.4 MMOL/L (ref 3.4–5.3)
RBC # BLD AUTO: 2.54 10E12/L (ref 4.4–5.9)
RBC # BLD AUTO: 2.82 10E12/L (ref 4.4–5.9)
RBC # BLD AUTO: 3.04 10E12/L (ref 4.4–5.9)
RBC # BLD AUTO: 3.3 10E12/L (ref 4.4–5.9)
RBC # BLD AUTO: 3.34 10E12/L (ref 4.4–5.9)
RBC # BLD AUTO: 3.4 10E12/L (ref 4.4–5.9)
SODIUM SERPL-SCNC: 142 MMOL/L (ref 133–144)
SODIUM SERPL-SCNC: 143 MMOL/L (ref 133–144)
SODIUM SERPL-SCNC: 144 MMOL/L (ref 133–144)
WBC # BLD AUTO: 6.6 10E9/L (ref 4–11)
WBC # BLD AUTO: 7.5 10E9/L (ref 4–11)
WBC # BLD AUTO: 8.3 10E9/L (ref 4–11)
WBC # BLD AUTO: 8.4 10E9/L (ref 4–11)
WBC # BLD AUTO: 9.1 10E9/L (ref 4–11)
WBC # BLD AUTO: 9.8 10E9/L (ref 4–11)

## 2018-12-06 PROCEDURE — 99233 SBSQ HOSP IP/OBS HIGH 50: CPT | Mod: GC | Performed by: INTERNAL MEDICINE

## 2018-12-06 PROCEDURE — 85384 FIBRINOGEN ACTIVITY: CPT | Performed by: THORACIC SURGERY (CARDIOTHORACIC VASCULAR SURGERY)

## 2018-12-06 PROCEDURE — 25000128 H RX IP 250 OP 636: Performed by: STUDENT IN AN ORGANIZED HEALTH CARE EDUCATION/TRAINING PROGRAM

## 2018-12-06 PROCEDURE — 83735 ASSAY OF MAGNESIUM: CPT | Performed by: SURGERY

## 2018-12-06 PROCEDURE — 83605 ASSAY OF LACTIC ACID: CPT | Performed by: THORACIC SURGERY (CARDIOTHORACIC VASCULAR SURGERY)

## 2018-12-06 PROCEDURE — 20000004 ZZH R&B ICU UMMC

## 2018-12-06 PROCEDURE — 80048 BASIC METABOLIC PNL TOTAL CA: CPT | Performed by: SURGERY

## 2018-12-06 PROCEDURE — 85610 PROTHROMBIN TIME: CPT | Performed by: STUDENT IN AN ORGANIZED HEALTH CARE EDUCATION/TRAINING PROGRAM

## 2018-12-06 PROCEDURE — 85610 PROTHROMBIN TIME: CPT | Performed by: THORACIC SURGERY (CARDIOTHORACIC VASCULAR SURGERY)

## 2018-12-06 PROCEDURE — 40000014 ZZH STATISTIC ARTERIAL MONITORING DAILY

## 2018-12-06 PROCEDURE — 82805 BLOOD GASES W/O2 SATURATION: CPT | Performed by: THORACIC SURGERY (CARDIOTHORACIC VASCULAR SURGERY)

## 2018-12-06 PROCEDURE — 25000132 ZZH RX MED GY IP 250 OP 250 PS 637: Performed by: NURSE PRACTITIONER

## 2018-12-06 PROCEDURE — 94645 CONT INHLJ TX EACH ADDL HOUR: CPT

## 2018-12-06 PROCEDURE — 80048 BASIC METABOLIC PNL TOTAL CA: CPT | Performed by: THORACIC SURGERY (CARDIOTHORACIC VASCULAR SURGERY)

## 2018-12-06 PROCEDURE — 00000146 ZZHCL STATISTIC GLUCOSE BY METER IP

## 2018-12-06 PROCEDURE — 25000128 H RX IP 250 OP 636: Performed by: SURGERY

## 2018-12-06 PROCEDURE — 85027 COMPLETE CBC AUTOMATED: CPT | Performed by: SURGERY

## 2018-12-06 PROCEDURE — 85520 HEPARIN ASSAY: CPT | Performed by: SURGERY

## 2018-12-06 PROCEDURE — 25000128 H RX IP 250 OP 636: Performed by: INTERNAL MEDICINE

## 2018-12-06 PROCEDURE — 40000275 ZZH STATISTIC RCP TIME EA 10 MIN

## 2018-12-06 PROCEDURE — 40000196 ZZH STATISTIC RAPCV CVP MONITORING

## 2018-12-06 PROCEDURE — 82330 ASSAY OF CALCIUM: CPT | Performed by: THORACIC SURGERY (CARDIOTHORACIC VASCULAR SURGERY)

## 2018-12-06 PROCEDURE — P9041 ALBUMIN (HUMAN),5%, 50ML: HCPCS | Performed by: SURGERY

## 2018-12-06 PROCEDURE — 71045 X-RAY EXAM CHEST 1 VIEW: CPT

## 2018-12-06 PROCEDURE — 94003 VENT MGMT INPAT SUBQ DAY: CPT

## 2018-12-06 PROCEDURE — 93005 ELECTROCARDIOGRAM TRACING: CPT

## 2018-12-06 PROCEDURE — 25000128 H RX IP 250 OP 636: Performed by: THORACIC SURGERY (CARDIOTHORACIC VASCULAR SURGERY)

## 2018-12-06 PROCEDURE — 25000128 H RX IP 250 OP 636

## 2018-12-06 PROCEDURE — 25000125 ZZHC RX 250: Performed by: STUDENT IN AN ORGANIZED HEALTH CARE EDUCATION/TRAINING PROGRAM

## 2018-12-06 PROCEDURE — 85027 COMPLETE CBC AUTOMATED: CPT | Performed by: THORACIC SURGERY (CARDIOTHORACIC VASCULAR SURGERY)

## 2018-12-06 PROCEDURE — 25000132 ZZH RX MED GY IP 250 OP 250 PS 637: Performed by: THORACIC SURGERY (CARDIOTHORACIC VASCULAR SURGERY)

## 2018-12-06 PROCEDURE — P9041 ALBUMIN (HUMAN),5%, 50ML: HCPCS

## 2018-12-06 PROCEDURE — 25000125 ZZHC RX 250: Performed by: SURGERY

## 2018-12-06 PROCEDURE — 40000048 ZZH STATISTIC DAILY SWAN MONITORING

## 2018-12-06 PROCEDURE — 25000132 ZZH RX MED GY IP 250 OP 250 PS 637: Performed by: SURGERY

## 2018-12-06 PROCEDURE — C9113 INJ PANTOPRAZOLE SODIUM, VIA: HCPCS | Performed by: THORACIC SURGERY (CARDIOTHORACIC VASCULAR SURGERY)

## 2018-12-06 PROCEDURE — 93010 ELECTROCARDIOGRAM REPORT: CPT | Performed by: INTERNAL MEDICINE

## 2018-12-06 PROCEDURE — 84100 ASSAY OF PHOSPHORUS: CPT | Performed by: THORACIC SURGERY (CARDIOTHORACIC VASCULAR SURGERY)

## 2018-12-06 PROCEDURE — 99291 CRITICAL CARE FIRST HOUR: CPT | Mod: GC | Performed by: INTERNAL MEDICINE

## 2018-12-06 PROCEDURE — 84100 ASSAY OF PHOSPHORUS: CPT | Performed by: SURGERY

## 2018-12-06 PROCEDURE — 83735 ASSAY OF MAGNESIUM: CPT | Performed by: THORACIC SURGERY (CARDIOTHORACIC VASCULAR SURGERY)

## 2018-12-06 PROCEDURE — 85730 THROMBOPLASTIN TIME PARTIAL: CPT | Performed by: THORACIC SURGERY (CARDIOTHORACIC VASCULAR SURGERY)

## 2018-12-06 RX ORDER — HEPARIN SODIUM 10000 [USP'U]/100ML
500 INJECTION, SOLUTION INTRAVENOUS CONTINUOUS
Status: DISCONTINUED | OUTPATIENT
Start: 2018-12-06 | End: 2018-12-07

## 2018-12-06 RX ORDER — ALBUMIN, HUMAN INJ 5% 5 %
SOLUTION INTRAVENOUS
Status: COMPLETED
Start: 2018-12-06 | End: 2018-12-06

## 2018-12-06 RX ORDER — ALBUMIN, HUMAN INJ 5% 5 %
25 SOLUTION INTRAVENOUS ONCE
Status: COMPLETED | OUTPATIENT
Start: 2018-12-06 | End: 2018-12-06

## 2018-12-06 RX ADMIN — MUPIROCIN 1 G: 20 OINTMENT TOPICAL at 08:50

## 2018-12-06 RX ADMIN — FLUCONAZOLE 200 MG: 2 INJECTION, SOLUTION INTRAVENOUS at 10:02

## 2018-12-06 RX ADMIN — MUPIROCIN 1 G: 20 OINTMENT TOPICAL at 19:33

## 2018-12-06 RX ADMIN — MILRINONE LACTATE 0.38 MCG/KG/MIN: 200 INJECTION, SOLUTION INTRAVENOUS at 07:00

## 2018-12-06 RX ADMIN — POLYETHYLENE GLYCOL 3350 17 G: 17 POWDER, FOR SOLUTION ORAL at 08:48

## 2018-12-06 RX ADMIN — PROPOFOL 25 MCG/KG/MIN: 10 INJECTION, EMULSION INTRAVENOUS at 10:01

## 2018-12-06 RX ADMIN — VANCOMYCIN HYDROCHLORIDE 1500 MG: 10 INJECTION, POWDER, LYOPHILIZED, FOR SOLUTION INTRAVENOUS at 08:50

## 2018-12-06 RX ADMIN — PANTOPRAZOLE SODIUM 40 MG: 40 INJECTION, POWDER, FOR SOLUTION INTRAVENOUS at 08:46

## 2018-12-06 RX ADMIN — ALBUMIN HUMAN 25 G: 50 SOLUTION INTRAVENOUS at 18:58

## 2018-12-06 RX ADMIN — Medication 100 MCG/HR: at 08:48

## 2018-12-06 RX ADMIN — EPINEPHRINE 0.08 MCG/KG/MIN: 1 INJECTION PARENTERAL at 18:58

## 2018-12-06 RX ADMIN — EPOPROSTENOL SODIUM 20 NG/KG/MIN: 1500000 INJECTION, POWDER, LYOPHILIZED, FOR SOLUTION INTRAVENOUS at 02:26

## 2018-12-06 RX ADMIN — ALBUMIN HUMAN 25 G: 0.05 INJECTION, SOLUTION INTRAVENOUS at 11:37

## 2018-12-06 RX ADMIN — ASPIRIN 81 MG CHEWABLE TABLET 81 MG: 81 TABLET CHEWABLE at 08:46

## 2018-12-06 RX ADMIN — SODIUM CHLORIDE 600 MG: 9 INJECTION, SOLUTION INTRAVENOUS at 09:56

## 2018-12-06 RX ADMIN — LEVOFLOXACIN 500 MG: 5 INJECTION, SOLUTION INTRAVENOUS at 10:02

## 2018-12-06 RX ADMIN — THIAMINE HCL TAB 100 MG 100 MG: 100 TAB at 08:48

## 2018-12-06 RX ADMIN — MILRINONE LACTATE 0.38 MCG/KG/MIN: 200 INJECTION, SOLUTION INTRAVENOUS at 08:48

## 2018-12-06 RX ADMIN — SENNOSIDES AND DOCUSATE SODIUM 2 TABLET: 8.6; 5 TABLET ORAL at 08:47

## 2018-12-06 RX ADMIN — EPOPROSTENOL SODIUM 10 NG/KG/MIN: 1500000 INJECTION, POWDER, LYOPHILIZED, FOR SOLUTION INTRAVENOUS at 08:49

## 2018-12-06 RX ADMIN — ALBUMIN HUMAN 25 G: 0.05 INJECTION, SOLUTION INTRAVENOUS at 18:58

## 2018-12-06 RX ADMIN — POLYETHYLENE GLYCOL 3350 17 G: 17 POWDER, FOR SOLUTION ORAL at 19:34

## 2018-12-06 RX ADMIN — EPINEPHRINE 0.1 MCG/KG/MIN: 1 INJECTION PARENTERAL at 03:32

## 2018-12-06 RX ADMIN — SENNOSIDES AND DOCUSATE SODIUM 2 TABLET: 8.6; 5 TABLET ORAL at 19:34

## 2018-12-06 RX ADMIN — VASOPRESSIN 2 UNITS/HR: 20 INJECTION INTRAVENOUS at 21:03

## 2018-12-06 RX ADMIN — HEPARIN SODIUM 500 UNITS/HR: 10000 INJECTION, SOLUTION INTRAVENOUS at 13:57

## 2018-12-06 RX ADMIN — POTASSIUM CHLORIDE 20 MEQ: 29.8 INJECTION, SOLUTION INTRAVENOUS at 12:54

## 2018-12-06 RX ADMIN — VANCOMYCIN HYDROCHLORIDE 1500 MG: 10 INJECTION, POWDER, LYOPHILIZED, FOR SOLUTION INTRAVENOUS at 19:33

## 2018-12-06 RX ADMIN — ATORVASTATIN CALCIUM 10 MG: 10 TABLET, FILM COATED ORAL at 08:48

## 2018-12-06 RX ADMIN — Medication 100 MCG/HR: at 21:13

## 2018-12-06 RX ADMIN — VASOPRESSIN 2 UNITS/HR: 20 INJECTION INTRAVENOUS at 02:27

## 2018-12-06 ASSESSMENT — ACTIVITIES OF DAILY LIVING (ADL)
ADLS_ACUITY_SCORE: 10
ADLS_ACUITY_SCORE: 10
ADLS_ACUITY_SCORE: 12
ADLS_ACUITY_SCORE: 10
ADLS_ACUITY_SCORE: 12
ADLS_ACUITY_SCORE: 10

## 2018-12-06 ASSESSMENT — PAIN DESCRIPTION - DESCRIPTORS: DESCRIPTORS: ACHING

## 2018-12-06 NOTE — ANESTHESIA POSTPROCEDURE EVALUATION
Anesthesia POST Procedure Evaluation    Patient: Danielito Chu   MRN:     [id number] Gender:   male   Age:    63 year old :      1955        Preoperative Diagnosis: Heart Failure    Procedure(s):  Partial Median Sternotomy, Left Thoracotomy, Minimally Invasive Left Ventricular Assist Device Placement (Heartmate III), On Cardiopulmonary Bypass   Postop Comments: No value filed.       Anesthesia Type:  General    Reportable Event: NO     PAIN: Uncomplicated        Neuro/Psych: Uneventful perioperative course   Sign Out Status: Planned Postop Sedation     Airway/Resp.: Uneventful perioperative course   Sign Out Status: Airway Device present     Airway Device: ETT                 Reason: Planned (pre-op)     CV: Uneventful perioperative course   Sign Out status: CV Support within EXPECTED Parameters     Disposition:   Sign Out in:  ICU  Disposition:  ICU  Recovery Course: Recovery in ICU  Follow-Up: Not required           Last Anesthesia Record Vitals:  CRNA VITALS  2018 1355 - 2018 1455      2018             ART BP: (!)  88/70    Ht Rate: 89          Last PACU/Preop Vitals:  Vitals:    18 0630 18 0645 18 0700   BP:      Pulse:      Resp:      Temp:      SpO2: 100% 99% 100%         Electronically Signed By: Gume Case MD, 2018, 7:33 AM

## 2018-12-06 NOTE — PROGRESS NOTES
CV ICU PROGRESS NOTE  December 6, 2018    CO-MORBIDITIES:   Nocturnal oxygen desaturation  (primary encounter diagnosis)  Acute on chronic systolic heart failure (H)    ASSESSMENT: Danielito Chu is a 63 year-old male with a PMHx s/f SHIRLEY, HTN, CHF (LVEF 15%), NIDDM2 who underwent LVAD placement on 12/5/18 with Dr. Silva.    TODAY'S PROGRESS:   - decrease flolan to 10  - milrinone 0.125 mcg/kg/min  - start heparin straight rate at 500u/hr after RAPS eval  - wean pressors as able    PLAN:  PLAN:   Neuro/ pain/ sedation:  - Monitor neurological status. Notify the MD for any acute changes in exam.  - Pain: Fentanyl gtt, Oxy/Dilaudid prn, Acetaminophen 975mg TID scheduled, Gabapentin 300mg BID x3 days, Lidoderm.  - Sedation: Propofol gtt  - RAPS consult for possible single-shot left paravertebral injection vs catheter pending coagulation status.     Pulmonary care:   - Supplemental oxygen to keep saturation above 92 %.  - Mechanically ventilated with the following settings: 14/480/100/5  - PST in the AM  #SHIRLEY  - consider extubating to BiPAP  - CPAP overnight as tolerated   #pHTN  - Inhaled flolan ween today, down to 10 this AM   - Milrinone ween back to 0.125mcg/kg/min     Cardiovascular:    #h/o HTN  - Holding PTA Anti-HTN meds given vasopressor requirements.  #CHF s/p Heartmate III LVAD placement 12/5  - Monitor hemodynamic status.   - Pressors: Epi/NE/Vaso  - ASA 325mg  - Atorvastatin 10mg     GI care:   - NPO   - Bowel regimen: Miralax, Senna-Colace      Fluids/ Electrolytes/ Nutrition:   - D5 1/2 NS with K for IV fluid hydration  - ICU electrolyte replacement protocol  - No indication for parenteral nutrition.  - Nutrition consulted. Appreciate recs  - q6h BMP     Renal/ Fluid Balance:    - Will monitor intake and output.  - Net even      Endocrine:  #NIDDM2    - Insulin gtt     ID/ Antibiotics:  - Completing a course of periop anti-microbials: Fluconazole, Levaquin, Rifampin, Vancomycin (48h post-op)      Heme:      - hgb stable  - q6h CBC      Prophylaxis:    - Mechanical prophylaxis for DVT.  - No chemical DVT prophylaxis due to high risk of bleeding.      MSK:    - PT and OT consulted. Appreciate recs.      Lines/ tubes/ drains:  - ETT  - RIJ MAC with [place]  - Valentine  - A-line  - CTx4 (left pleural, meds x2, pericardial)  - PIVx1      Disposition:  - CV ICU.     Patient seen, findings and plan discussed with CV ICU staff, Dr. James.    Kanenaheed Bird    ====================================    SUBJECTIVE:   Received 1.5L albumin for fluid resuscitation overnight otherwise no acute events. Sedated but arousable and follows commands. Pain well controled.     OBJECTIVE:   1. VITAL SIGNS:   Temp:  [96.4  F (35.8  C)-100.2  F (37.9  C)] 100  F (37.8  C)  Heart Rate:  [] 91  Resp:  [16] 16  BP: (75-85)/(47-56) 85/47  MAP:  [60 mmHg-82 mmHg] 64 mmHg  Arterial Line BP: (62-92)/(54-72) 66/57  FiO2 (%):  [40 %-100 %] 40 %  SpO2:  [92 %-100 %] 98 %  Ventilation Mode: CMV/AC  (Continuous Mandatory Ventilation/ Assist Control)  FiO2 (%): 40 %  Rate Set (breaths/minute): 14 breaths/min  Tidal Volume Set (mL): 480 mL  PEEP (cm H2O): 5 cmH2O  Oxygen Concentration (%): 45 %  Resp: 16    2. INTAKE/ OUTPUT:   I/O last 3 completed shifts:  In: 7929.47 [I.V.:3975.47; Other:891; NG/GT:120; IV Piggyback:1000]  Out: 3420 [Urine:2410; Emesis/NG output:125; Chest Tube:885]    3. PHYSICAL EXAMINATION:   General: sedated adult male lying in bed  Neuro: Sedated, arousable to voice and follows commands   Resp: Intubated and mechanically ventilated  CV: Mechanical noise  Abdomen: Soft, Non-distended, Non-tender  Incisions: c/d/i  Extremities: warm and well perfused    4. INVESTIGATIONS:   Arterial Blood Gases     Recent Labs  Lab 12/06/18  0336 12/05/18  2354 12/05/18  2047 12/05/18  1727   PH 7.47* 7.47* 7.46* 7.38   PCO2 35 32* 33* 36   PO2 95 116* 189* 214*   HCO3 26 24 23 21     Complete Blood Count     Recent Labs  Lab 12/06/18  0336  12/05/18  2354 12/05/18 2047 12/05/18  1727   WBC 7.5 6.6 8.2 10.1   HGB 10.3* 10.1* 10.8* 12.4*   * 85* 89* 90*     Basic Metabolic Panel    Recent Labs  Lab 12/06/18  0336 12/05/18  2354 12/05/18  2047 12/05/18  1727    143 144 142   POTASSIUM 4.4 3.9 3.7 4.1   CHLORIDE 109 109 110* 110*   CO2 24 25 24 20   BUN 15 18 20 20   CR 0.91 1.00 1.04 1.14   * 135* 142* 114*     Liver Function Tests    Recent Labs  Lab 12/06/18  0336 12/05/18  1455 12/05/18  1220 12/04/18  0800 12/03/18  1850 11/30/18  2349 11/30/18  0430   AST  --   --   --  14 12 15 17   ALT  --   --   --  13 14 15 15   ALKPHOS  --   --   --  97 99 90 94   BILITOTAL  --   --   --  2.0* 2.2* 2.9* 3.5*   ALBUMIN  --   --   --  3.2* 3.2* 3.3* 3.5   INR 1.81* 1.67* 2.03*  --   --   --  1.40*     Pancreatic Enzymes    Recent Labs  Lab 11/30/18  1130   LIPASE 51*   AMYLASE 20*     Coagulation Profile    Recent Labs  Lab 12/06/18  0336 12/05/18  1455 12/05/18  1220 11/30/18  0430   INR 1.81* 1.67* 2.03* 1.40*   PTT 38* 60* 35  --          5. RADIOLOGY:   Recent Results (from the past 24 hour(s))   XR Chest Port 1 View    Narrative    EXAM:  XR CHEST PORT 1 VW    INDICATION: s/p LVAD;     COMPARISON:  11/28/2018, CT 10/23/2018    FINDINGS:  AP view of the chest. Interval placement of Buna-Soy catheter with  tip in the right main pulmonary artery. Endotracheal tube with the tip  in mid to low trachea. AICD with partially visualized right  ventricular lead. Median sternotomy wires are intact. LVAD is  partially visualized. Right arm PICC projects with tip in the mid SVC.  2 mediastinal drains and a left basilar chest. Enteric tube courses  below the diaphragm and off image margin. No pneumothorax.  Streaky  bibasilar opacities. Slightly prominent pulmonary vasculature.  Cardiomediastinal silhouette is stably enlarged. LVAD obstructs the  view of the left costophrenic angle, but likely small left pleural  effusion. Severe degenerative change  in the shoulder joints.      Impression    IMPRESSION:  1. Changes from interval placement of LVAD with right IJ Norton-Soy  catheter with tip in the right main pulmonary artery. ET tube, gastric  tube, mediastinal drains, and left chest tube noted.  2.  Streaky bibasilar opacities, likely atelectasis, with a probable  left small pleural effusion.     I have personally reviewed the examination and initial interpretation  and I agree with the findings.    ADAMS WILSON, DO   XR Chest Port 1 View    Narrative    Exam: XR CHEST PORT 1 VW, 12/6/2018 1:00 AM    Indication: s/p lvad;     Comparison: X-ray 12/5/2018    Findings:     Frontal view x-ray of the chest. Right IJ Norton-Soy catheter with tip  projecting over the main pulmonary artery. Left ventricular assist  device. Intact appearing sternal wires. Enteric tube is coursing below  the diaphragm with tip collimated off the frontal view. Left chest  wall cardiac device with intracardiac lead. No pneumothorax. Streaky  bibasilar opacities. Unchanged mediastinal and left chest tube.      Impression    Impression:   1. Streaky basilar opacities likely representing atelectasis.  2. Increased interstitial markings since the previous study suggesting  increasing interstitial edema.  3. Stable support devices.    I have personally reviewed the examination and initial interpretation  and I agree with the findings.    OTILIA STARR MD       =========================================

## 2018-12-06 NOTE — PROGRESS NOTES
Pt s/p minimally invasive Heartmate III placement.  Pt on Epi, Norepi, Vaso, and Milrinone from OR; Precedex for sedation; and inhaled Flolan.   Neuro: pt able to move all extremities to command; propofol and fentanyl added for sedation.    Initially PIs were 2.5-3.0, with CVP 7-8 on Epi @ 0.15 mcg, Norepi @ 0.1 mcg, Vaso @ 1.2 unit(s), and Milrinone @ 0.375 mcg. Unable to wean pressors, currently Epi @ 0.1 mcg, Norepi @ 0.1 mcg, Vaso @ 2 unit(s), and Milrinone @ 0.375 mcg.  CVP 5-6 with lower PIs of 1.9-2.2, volume replacement initiated.  Total of 1 L of LR given and 500 mL of albumin given.  Hgb and Plts stable  UOP ~ 50 ml/hr  CT output stable - see flowsheets  LVAD @ 5200 rpms: Flow 4.4-4.8, Pwr 3.8-3.9, PIs 1.9-3.0  SVO2 > 75  CI/CO 1.5-2.1/3.5-4.1    K+ and Phos replaced.    PA catheter at 57 cm up arrival from OR, questionable landmark noted from previous documentation of 63 cm and waveform abnormal. MDs to bedside to assess and review CXR. CXR placement WNL, PA port flushed per MD request and able to obtain adequate waveform.     Continue with volume replacement.

## 2018-12-06 NOTE — CONSULTS
[facility]  Consult for nerve block cancelled, 1148  INR 2.09   Thank you for considering our involvement in Danielito Chu's care  Please contact us if future questions or concerns    Jessica Wilson CNP  [facility] ([facility])  December 6, 2018  1:41 PM    [facility] Contact Info (for in-house use only):  Job code ID: [id number]   [place] [phone]  Peds [phone]  [place] phone: dial [phone], enter jobcode ID, then enter call-back number.    Text: Use [employer] on the Intranet <Paging/Directory> tab and enter Jobcode ID.   If no call back at any time, contact the hospital  and ask for [facility] attending or backup

## 2018-12-06 NOTE — PLAN OF CARE
Problem: Restraint for Non-Violent/Non-Self-Destructive Behavior  Goal: Prevent/Manage Potential Problems  Maintain safety of patient and others during period of restraint.  Promote psychological and physical wellbeing.  Prevent injury to skin and involved body parts.  Promote nutrition, hydration, and elimination.   Outcome: No Change  D/I/A/P:  Pt recovering from anesthesia from OR continues with bilateral soft wrist restraints for prevention of pulling on tubes or possible self extubation. Plan to discontinue restraints when Pt is extubated.

## 2018-12-06 NOTE — PLAN OF CARE
Problem: Patient Care Overview  Goal: Plan of Care/Patient Progress Review  1. Pt will be hemodynamically stable.  2. Pt and family will verbalize understanding of plan of care.  3. Pt will remain free of falls  4. Pain will be under control or minimal   Outcome: Improving  D/I: Pt given total of 1 liter 5% Albumin,  1 amp NaHCO3 and 2 gm CaCO3 for HM3 LVAD PI's as low as 1.8 along with soft MAP's.  Magnesium and Potassium also replaced per lytes protocol. PI up to 3 at end of shift. HM3 flows 4's Speed= 5200 & power = 3.8.  Only able to wean Epi Gtt down from 0.1 to 0.08 mcg/kg/min.  Levo Gtt weaned from 0.1 to 0.08 mcg/kg/min. Vaso Gtt continues @ 2 units/hr. Milrinone Gtt continues @ 0.375 mcg/kg/min.  Precedex Gtt weaned from 0.3 mcg/kg/min to off and Propofol Gtt currently @ 25 mcg/kg/min.  Fentanyl Gtt continues @ 100 mcg/min.  Propofol Gtt decreased briefly this shift and Pt able to move all extremities to Pt request and shake head no when asked if he was having any pain. CVP = 9, 10, 13, 11 & 9. PA = 28/17, 26/17, 34/22, 30/21 & 28/18. Oxyhgb = 68%, 64% & 63%. MARILYNN CI = 2.7, 2.5 & 2.5. SVR = 933, 855 & 920. Flolan Inhalation decreased at very end of shift from 20 to 10 ng/kg/min per MD order. Combined total chest tubes output averaging 50 ml/hr this shift. Urine output averaging 100 ml/hr this shift.     A/P:  POD#1 HM3 LVAD placement.  Plan to continue to wean pressors as tolerated keeping MAP > 65.

## 2018-12-06 NOTE — PROGRESS NOTES
CV ICU PROGRESS NOTE  December 6, 2018     CO-MORBIDITIES:   Nocturnal oxygen desaturation  (primary encounter diagnosis)  Acute on chronic systolic heart failure (H)     ASSESSMENT: Danielito Chu is a 63 year-old male with a PMHx s/f SHIRLEY, HTN, CHF (LVEF 15%), NIDDM2 who underwent LVAD placement on 12/5/18 with Dr. Silva.     TODAY'S PROGRESS:   - decrease flolan to 10  - milrinone 0.125 mcg/kg/min  - start heparin straight rate at 500u/hr after RAPS eval  - wean pressors as able     PLAN:  PLAN:   Neuro/ pain/ sedation:  - Monitor neurological status. Notify the MD for any acute changes in exam.  - Pain: Fentanyl gtt, Oxy/Dilaudid prn, Acetaminophen 975mg TID scheduled, Gabapentin 300mg BID x3 days, Lidoderm.  - Sedation: Propofol gtt  - RAPS consult for possible single-shot left paravertebral injection vs catheter pending coagulation status.     Pulmonary care:   - Supplemental oxygen to keep saturation above 92 %.  - Mechanically ventilated with the following settings: 14/480/100/5  - PST in the AM  #SHIRLEY  - consider extubating to BiPAP  - CPAP overnight as tolerated   #pHTN  - Inhaled flolan ween today, down to 10 this AM   - Milrinone ween back to 0.125mcg/kg/min      Cardiovascular:    #h/o HTN  - Holding PTA Anti-HTN meds given vasopressor requirements.  #CHF s/p Heartmate III LVAD placement 12/5  - Monitor hemodynamic status.   - Pressors: Epi/NE/Vaso  - ASA 325mg  - Atorvastatin 10mg     GI care:   - NPO   - Bowel regimen: Miralax, Senna-Colace       Fluids/ Electrolytes/ Nutrition:   - D5 1/2 NS with K for IV fluid hydration  - ICU electrolyte replacement protocol  - No indication for parenteral nutrition.  - Nutrition consulted. Appreciate recs  - q6h BMP     Renal/ Fluid Balance:    - Will monitor intake and output.  - Net even       Endocrine:  #NIDDM2    - Insulin gtt      ID/ Antibiotics:  - Completing a course of periop anti-microbials: Fluconazole, Levaquin, Rifampin, Vancomycin (48h  post-op)       Heme:     - hgb stable  - q6h CBC       Prophylaxis:    - Mechanical prophylaxis for DVT.  - No chemical DVT prophylaxis due to high risk of bleeding.       MSK:    - PT and OT consulted. Appreciate recs.       Lines/ tubes/ drains:  - ETT  - RIJ MAC with [place]  - Valentine  - A-line  - CTx4 (left pleural, meds x2, pericardial)  - PIVx1       Disposition:  - CV ICU.     Kane Bird     ====================================     SUBJECTIVE:   Received 1.5L albumin for fluid resuscitation overnight otherwise no acute events. Sedated but arousable and follows commands. Pain well controled.      OBJECTIVE:   1. VITAL SIGNS:   Temp:  [96.4  F (35.8  C)-100.2  F (37.9  C)] 100  F (37.8  C)  Heart Rate:  [] 91  Resp:  [16] 16  BP: (75-85)/(47-56) 85/47  MAP:  [60 mmHg-82 mmHg] 64 mmHg  Arterial Line BP: (62-92)/(54-72) 66/57  FiO2 (%):  [40 %-100 %] 40 %  SpO2:  [92 %-100 %] 98 %  Ventilation Mode: CMV/AC  (Continuous Mandatory Ventilation/ Assist Control)  FiO2 (%): 40 %  Rate Set (breaths/minute): 14 breaths/min  Tidal Volume Set (mL): 480 mL  PEEP (cm H2O): 5 cmH2O  Oxygen Concentration (%): 45 %  Resp: 16   2. INTAKE/ OUTPUT:   I/O last 3 completed shifts:  In: 7929.47 [I.V.:3975.47; Other:891; NG/GT:120; IV Piggyback:1000]  Out: 3420 [Urine:2410; Emesis/NG output:125; Chest Tube:885]     3. PHYSICAL EXAMINATION:   General: sedated adult male lying in bed  Neuro: Sedated, arousable to voice and follows commands   Resp: Intubated and mechanically ventilated  CV: Mechanical noise  Abdomen: Soft, Non-distended, Non-tender  Incisions: c/d/i  Extremities: warm and well perfused   4. INVESTIGATIONS:   Arterial Blood Gases      Recent Labs  Lab 12/06/18  0336 12/05/18  2354 12/05/18  2047 12/05/18  1727   PH 7.47* 7.47* 7.46* 7.38   PCO2 35 32* 33* 36   PO2 95 116* 189* 214*   HCO3 26 24 23 21      Complete Blood Count      Recent Labs  Lab 12/06/18  0336 12/05/18  2354 12/05/18 2047 12/05/18  1727   WBC 7.5  6.6 8.2 10.1   HGB 10.3* 10.1* 10.8* 12.4*   * 85* 89* 90*      Basic Metabolic Panel     Recent Labs  Lab 12/06/18  0336 12/05/18  2354 12/05/18  2047 12/05/18  1727    143 144 142   POTASSIUM 4.4 3.9 3.7 4.1   CHLORIDE 109 109 110* 110*   CO2 24 25 24 20   BUN 15 18 20 20   CR 0.91 1.00 1.04 1.14   * 135* 142* 114*      Liver Function Tests     Recent Labs  Lab 12/06/18  0336 12/05/18  1455 12/05/18  1220 12/04/18  0800 12/03/18  1850 11/30/18  2349 11/30/18  0430   AST  --   --   --  14 12 15 17   ALT  --   --   --  13 14 15 15   ALKPHOS  --   --   --  97 99 90 94   BILITOTAL  --   --   --  2.0* 2.2* 2.9* 3.5*   ALBUMIN  --   --   --  3.2* 3.2* 3.3* 3.5   INR 1.81* 1.67* 2.03*  --   --   --  1.40*      Pancreatic Enzymes     Recent Labs  Lab 11/30/18  1130   LIPASE 51*   AMYLASE 20*      Coagulation Profile     Recent Labs  Lab 12/06/18  0336 12/05/18  1455 12/05/18  1220 11/30/18  0430   INR 1.81* 1.67* 2.03* 1.40*   PTT 38* 60* 35  --             5. RADIOLOGY:       Recent Results (from the past 24 hour(s))   XR Chest Port 1 View     Narrative     EXAM:  XR CHEST PORT 1 VW     INDICATION: s/p LVAD;      COMPARISON:  11/28/2018, CT 10/23/2018     FINDINGS:  AP view of the chest. Interval placement of [place]-Soy catheter with  tip in the right main pulmonary artery. Endotracheal tube with the tip  in mid to low trachea. AICD with partially visualized right  ventricular lead. Median sternotomy wires are intact. LVAD is  partially visualized. Right arm PICC projects with tip in the mid SVC.  2 mediastinal drains and a left basilar chest. Enteric tube courses  below the diaphragm and off image margin. No pneumothorax.  Streaky  bibasilar opacities. Slightly prominent pulmonary vasculature.  Cardiomediastinal silhouette is stably enlarged. LVAD obstructs the  view of the left costophrenic angle, but likely small left pleural  effusion. Severe degenerative change in the shoulder joints.      Impression     IMPRESSION:  1. Changes from interval placement of LVAD with right IJ [place]-Soy  catheter with tip in the right main pulmonary artery. ET tube, gastric  tube, mediastinal drains, and left chest tube noted.  2.  Streaky bibasilar opacities, likely atelectasis, with a probable  left small pleural effusion.      I have personally reviewed the examination and initial interpretation  and I agree with the findings.     ADAMS WILSON, DO   XR Chest Port 1 View     Narrative     Exam: XR CHEST PORT 1 VW, 12/6/2018 1:00 AM     Indication: s/p lvad;      Comparison: X-ray 12/5/2018     Findings:      Frontal view x-ray of the chest. Right IJ [place]-Soy catheter with tip  projecting over the main pulmonary artery. Left ventricular assist  device. Intact appearing sternal wires. Enteric tube is coursing below  the diaphragm with tip collimated off the frontal view. Left chest  wall cardiac device with intracardiac lead. No pneumothorax. Streaky  bibasilar opacities. Unchanged mediastinal and left chest tube.     Impression     Impression:   1. Streaky basilar opacities likely representing atelectasis.  2. Increased interstitial markings since the previous study suggesting  increasing interstitial edema.  3. Stable support devices.     I have personally reviewed the examination and initial interpretation  and I agree with the findings.     OTILIA STARR MD         =========================================

## 2018-12-06 NOTE — PROGRESS NOTES
Cardiology - Heart failure service    Danielito Chu is a 63 year-old male with a PMHx s/f SHIRLEY, HTN, CHF (LVEF 15%), NIDDM2 who underwent LVAD HM III placement on 12/5/18 with Dr. Silva.    Interval events: Doing well. CVP 7-8 overnight, low PIs, given 1 L albumin. Post-op ARCHANA: RV moderately decompressed, moderate MR, moderate TR, moderate AI.    Temp:  [96.4  F (35.8  C)-100.2  F (37.9  C)] 100  F (37.8  C)  Heart Rate:  [] 86  Resp:  [16] 16  BP: ()/(47-62) 85/47  MAP:  [60 mmHg-82 mmHg] 65 mmHg  Arterial Line BP: (62-92)/(54-72) 67/61  FiO2 (%):  [40 %-100 %] 40 %  SpO2:  [92 %-100 %] 99 %  Tele: No events.    Hemodynamics:  CVP 14, PA 28/18, PAWP 15, CI 2.2, SVO2 63, SVR 1000  LVAD:   HM III, Speed: 5200, PI 3.1; no flow alarms or power spikes none    I/O last 3 completed shifts:  In: 7273.99 [I.V.:3379.99; Other:891; NG/GT:60; IV Piggyback:1000]  Out: 2875 [Urine:2285; Chest Tube:590], 300 since midnight CT, 900 urine.     Hemodynamic drips: Epi 0.08, milrinone 0.375, levo 0.08, vaso 2.   Selected medications: aspirin, atorva 10, levofloxacin, PPI, vanc. Sedation 100 fentanyl, 25 propofol. Flolan 20, now down to 10.     Physical examination:  Vitals:    12/02/18 0000 12/03/18 0615 12/04/18 0801 12/05/18 0511   Weight: 81.7 kg (180 lb 1.6 oz) 81.8 kg (180 lb 4.8 oz) 81.9 kg (180 lb 8 oz) 81.9 kg (180 lb 9.6 oz)    12/06/18 0200   Weight: 86.6 kg (190 lb 14.7 oz)     General: adult male lying supine in bed  Neuro: sedated and intubated  Resp: Breathing non-labored on mechanical ventilation  CV: RRR  Abdomen: Soft, Non-distended, Non-tender  Incisions: c/d/i  Extremities: warm and well perfused    Labs were reviewed.  BMP  Recent Labs  Lab 12/06/18  0336 12/05/18  2354 12/05/18 2047 12/05/18  1727    143 144 142   POTASSIUM 4.4 3.9 3.7 4.1   CHLORIDE 109 109 110* 110*   ASHLEE 8.5 8.6 8.1* 8.8   CO2 24 25 24 20   BUN 15 18 20 20   CR 0.91 1.00 1.04 1.14   * 135* 142* 114*     LFTs  Recent  Labs  Lab 12/04/18  0800 12/03/18  1850 11/30/18  2349 11/30/18  0430   ALKPHOS 97 99 90 94   AST 14 12 15 17   ALT 13 14 15 15   BILITOTAL 2.0* 2.2* 2.9* 3.5*   PROTTOTAL 6.8 7.1 7.2 7.6   ALBUMIN 3.2* 3.2* 3.3* 3.5      CBC  Recent Labs  Lab 12/06/18  0336 12/05/18  2354 12/05/18  2047 12/05/18  1727   WBC 7.5 6.6 8.2 10.1   RBC 3.40* 3.30* 3.55* 4.02*   HGB 10.3* 10.1* 10.8* 12.4*   HCT 33.2* 31.7* 34.1* 38.7*   MCV 98 96 96 96   MCH 30.3 30.6 30.4 30.8   MCHC 31.0* 31.9 31.7 32.0   RDW 18.3* 18.0* 18.0* 18.0*   * 85* 89* 90*     INR  Recent Labs  Lab 12/06/18  0336 12/05/18  1455 12/05/18  1220 11/30/18  0430   INR 1.81* 1.67* 2.03* 1.40*       Radiology none.       IMPRESSION & PLAN    Danielito Chu is a 63 year-old male with a PMHx s/f SHIRLEY, HTN, CHF (LVEF 15%), NIDDM2 who underwent LVAD placement on 12/5/18 with Dr. Silva.    #h/o HTN  #CHF s/p Heartmate III LVAD placement 12/5  - wean pressors; backing off milrinone to 0.125. Afterwards, will come down on levophed and then vasopressin.  - RV had mild dysfunction prior to LVAD. Will cut flolan to 10 from 20. No nitric oxide.   - Back off on sedation, wean vaso/levo first. Possible extubation tomorrow per primary team.   - bedside echo to assess RV.   - ASA 325mg. Heparin per primary team.   - Atorvastatin 10mg    Thank you for allowing me to care for this patient. This has been discussed with the attending physician.    Muna Meyer MD  Cardiology Fellow, PGY-5    I have reviewed today's vital signs, notes, medications, labs and imaging.  I have also seen and examined the patient and agree with the findings and plan as outlined above.  Pt is POD#1 S/P HM3 LVAD placement currently sedated and intubated.  MAPS 60-70, CI 2.6, FIO2 40%, 2,2 BLANCA on decreased milrinone and pressors.  Lungs clear and mechanical LVAD hum.  Labs with Cr 0.9, WBC 7.5.  Assessment: Pt with endstage heart failure with HM3 placement and with minimal bleeding.  Continue to wean  milrinone and pressor support.  Anticipate extubation tomorrow. Pt seen X3 today for total critical care time 25 min.     Bennett Rene MD, PhD  Professor, Heart Failure and Cardiac Transplantation  [facility]

## 2018-12-06 NOTE — PLAN OF CARE
Problem: Patient Care Overview  Goal: Plan of Care/Patient Progress Review  1. Pt will be hemodynamically stable.  2. Pt and family will verbalize understanding of plan of care.  3. Pt will remain free of falls  4. Pain will be under control or minimal   4E - Pt with new LVAD, continued on multiple pressors this AM, and working towards decreasing sedation and hopefully extubating. HOLDING PT with plan to re-assess as medically appropriate pending increased participation or ROM needs.

## 2018-12-07 ENCOUNTER — APPOINTMENT (OUTPATIENT)
Dept: GENERAL RADIOLOGY | Facility: CLINIC | Age: 63
DRG: 001 | End: 2018-12-07
Attending: SURGERY
Payer: COMMERCIAL

## 2018-12-07 ENCOUNTER — APPOINTMENT (OUTPATIENT)
Dept: OCCUPATIONAL THERAPY | Facility: CLINIC | Age: 63
DRG: 001 | End: 2018-12-07
Attending: THORACIC SURGERY (CARDIOTHORACIC VASCULAR SURGERY)
Payer: COMMERCIAL

## 2018-12-07 LAB
ABO + RH BLD: NORMAL
ABO + RH BLD: NORMAL
ALBUMIN SERPL-MCNC: 3.1 G/DL (ref 3.4–5)
ALBUMIN UR-MCNC: 10 MG/DL
ALP SERPL-CCNC: 72 U/L (ref 40–150)
ALT SERPL W P-5'-P-CCNC: 18 U/L (ref 0–70)
ANION GAP SERPL CALCULATED.3IONS-SCNC: 7 MMOL/L (ref 3–14)
ANION GAP SERPL CALCULATED.3IONS-SCNC: 8 MMOL/L (ref 3–14)
APPEARANCE UR: ABNORMAL
APTT PPP: 60 SEC (ref 22–37)
AST SERPL W P-5'-P-CCNC: 117 U/L (ref 0–45)
BACTERIA SPEC CULT: ABNORMAL
BASE DEFICIT BLDA-SCNC: 1.3 MMOL/L
BASE DEFICIT BLDV-SCNC: NORMAL MMOL/L
BASE EXCESS BLDA CALC-SCNC: 0.7 MMOL/L
BASE EXCESS BLDA CALC-SCNC: 1 MMOL/L
BASE EXCESS BLDV CALC-SCNC: 0.3 MMOL/L
BASE EXCESS BLDV CALC-SCNC: 0.5 MMOL/L
BASE EXCESS BLDV CALC-SCNC: 1.2 MMOL/L
BASE EXCESS BLDV CALC-SCNC: 1.9 MMOL/L
BASE EXCESS BLDV CALC-SCNC: 2.1 MMOL/L
BASE EXCESS BLDV CALC-SCNC: 2.2 MMOL/L
BASE EXCESS BLDV CALC-SCNC: NORMAL MMOL/L
BILIRUB DIRECT SERPL-MCNC: 3.7 MG/DL (ref 0–0.2)
BILIRUB SERPL-MCNC: 5.8 MG/DL (ref 0.2–1.3)
BILIRUB UR QL STRIP: ABNORMAL
BLD GP AB SCN SERPL QL: NORMAL
BLOOD BANK CMNT PATIENT-IMP: NORMAL
BUN SERPL-MCNC: 14 MG/DL (ref 7–30)
BUN SERPL-MCNC: 16 MG/DL (ref 7–30)
BUN SERPL-MCNC: 16 MG/DL (ref 7–30)
BUN SERPL-MCNC: 17 MG/DL (ref 7–30)
CA-I BLD-MCNC: 4.8 MG/DL (ref 4.4–5.2)
CALCIUM SERPL-MCNC: 8.4 MG/DL (ref 8.5–10.1)
CALCIUM SERPL-MCNC: 8.6 MG/DL (ref 8.5–10.1)
CALCIUM SERPL-MCNC: 8.7 MG/DL (ref 8.5–10.1)
CALCIUM SERPL-MCNC: 8.9 MG/DL (ref 8.5–10.1)
CHLORIDE SERPL-SCNC: 110 MMOL/L (ref 94–109)
CHLORIDE SERPL-SCNC: 111 MMOL/L (ref 94–109)
CO2 SERPL-SCNC: 25 MMOL/L (ref 20–32)
CO2 SERPL-SCNC: 26 MMOL/L (ref 20–32)
COLOR UR AUTO: ABNORMAL
CREAT SERPL-MCNC: 0.81 MG/DL (ref 0.66–1.25)
CREAT SERPL-MCNC: 0.88 MG/DL (ref 0.66–1.25)
CREAT SERPL-MCNC: 0.94 MG/DL (ref 0.66–1.25)
CREAT SERPL-MCNC: 1 MG/DL (ref 0.66–1.25)
ERYTHROCYTE [DISTWIDTH] IN BLOOD BY AUTOMATED COUNT: 18.8 % (ref 10–15)
ERYTHROCYTE [DISTWIDTH] IN BLOOD BY AUTOMATED COUNT: 18.9 % (ref 10–15)
ERYTHROCYTE [DISTWIDTH] IN BLOOD BY AUTOMATED COUNT: 19 % (ref 10–15)
ERYTHROCYTE [DISTWIDTH] IN BLOOD BY AUTOMATED COUNT: 19.2 % (ref 10–15)
GFR SERPL CREATININE-BSD FRML MDRD: 76 ML/MIN/1.7M2
GFR SERPL CREATININE-BSD FRML MDRD: 81 ML/MIN/1.7M2
GFR SERPL CREATININE-BSD FRML MDRD: 87 ML/MIN/1.7M2
GFR SERPL CREATININE-BSD FRML MDRD: >90 ML/MIN/1.7M2
GLUCOSE BLDC GLUCOMTR-MCNC: 106 MG/DL (ref 70–99)
GLUCOSE BLDC GLUCOMTR-MCNC: 107 MG/DL (ref 70–99)
GLUCOSE BLDC GLUCOMTR-MCNC: 113 MG/DL (ref 70–99)
GLUCOSE BLDC GLUCOMTR-MCNC: 93 MG/DL (ref 70–99)
GLUCOSE BLDC GLUCOMTR-MCNC: 94 MG/DL (ref 70–99)
GLUCOSE BLDC GLUCOMTR-MCNC: 98 MG/DL (ref 70–99)
GLUCOSE SERPL-MCNC: 106 MG/DL (ref 70–99)
GLUCOSE SERPL-MCNC: 114 MG/DL (ref 70–99)
GLUCOSE SERPL-MCNC: 118 MG/DL (ref 70–99)
GLUCOSE SERPL-MCNC: 126 MG/DL (ref 70–99)
GLUCOSE UR STRIP-MCNC: NEGATIVE MG/DL
GRAM STN SPEC: ABNORMAL
GRAN CASTS #/AREA URNS LPF: 108 /LPF
HCO3 BLD-SCNC: 24 MMOL/L (ref 21–28)
HCO3 BLD-SCNC: 25 MMOL/L (ref 21–28)
HCO3 BLD-SCNC: 26 MMOL/L (ref 21–28)
HCO3 BLDV-SCNC: 27 MMOL/L (ref 21–28)
HCO3 BLDV-SCNC: NORMAL MMOL/L (ref 21–28)
HCT VFR BLD AUTO: 29.3 % (ref 40–53)
HCT VFR BLD AUTO: 29.7 % (ref 40–53)
HCT VFR BLD AUTO: 31.5 % (ref 40–53)
HCT VFR BLD AUTO: 32 % (ref 40–53)
HGB BLD-MCNC: 9 G/DL (ref 13.3–17.7)
HGB BLD-MCNC: 9.1 G/DL (ref 13.3–17.7)
HGB BLD-MCNC: 9.7 G/DL (ref 13.3–17.7)
HGB BLD-MCNC: 9.7 G/DL (ref 13.3–17.7)
HGB UR QL STRIP: NEGATIVE
INR PPP: 2.05 (ref 0.86–1.14)
INTERPRETATION ECG - MUSE: NORMAL
KETONES UR STRIP-MCNC: NEGATIVE MG/DL
LACTATE BLD-SCNC: 1.3 MMOL/L (ref 0.7–2)
LEUKOCYTE ESTERASE UR QL STRIP: ABNORMAL
LMWH PPP CHRO-ACNC: <0.1 IU/ML
LMWH PPP CHRO-ACNC: <0.1 IU/ML
Lab: ABNORMAL
MAGNESIUM SERPL-MCNC: 2.7 MG/DL (ref 1.6–2.3)
MCH RBC QN AUTO: 30.1 PG (ref 26.5–33)
MCH RBC QN AUTO: 30.2 PG (ref 26.5–33)
MCH RBC QN AUTO: 30.4 PG (ref 26.5–33)
MCH RBC QN AUTO: 30.7 PG (ref 26.5–33)
MCHC RBC AUTO-ENTMCNC: 30.3 G/DL (ref 31.5–36.5)
MCHC RBC AUTO-ENTMCNC: 30.6 G/DL (ref 31.5–36.5)
MCHC RBC AUTO-ENTMCNC: 30.7 G/DL (ref 31.5–36.5)
MCHC RBC AUTO-ENTMCNC: 30.8 G/DL (ref 31.5–36.5)
MCV RBC AUTO: 100 FL (ref 78–100)
MCV RBC AUTO: 100 FL (ref 78–100)
MCV RBC AUTO: 98 FL (ref 78–100)
MCV RBC AUTO: 98 FL (ref 78–100)
MUCOUS THREADS #/AREA URNS LPF: PRESENT /LPF
NITRATE UR QL: NEGATIVE
O2/TOTAL GAS SETTING VFR VENT: 40 %
O2/TOTAL GAS SETTING VFR VENT: ABNORMAL %
O2/TOTAL GAS SETTING VFR VENT: ABNORMAL %
O2/TOTAL GAS SETTING VFR VENT: NORMAL %
O2/TOTAL GAS SETTING VFR VENT: NORMAL %
OXYHGB MFR BLD: 95 % (ref 92–100)
OXYHGB MFR BLD: 95 % (ref 92–100)
OXYHGB MFR BLD: 96 % (ref 92–100)
OXYHGB MFR BLDV: 50 %
OXYHGB MFR BLDV: 61 %
OXYHGB MFR BLDV: 62 %
OXYHGB MFR BLDV: 63 %
OXYHGB MFR BLDV: 63 %
OXYHGB MFR BLDV: 65 %
OXYHGB MFR BLDV: NORMAL %
PCO2 BLD: 38 MM HG (ref 35–45)
PCO2 BLD: 40 MM HG (ref 35–45)
PCO2 BLD: 44 MM HG (ref 35–45)
PCO2 BLDV: 43 MM HG (ref 40–50)
PCO2 BLDV: 44 MM HG (ref 40–50)
PCO2 BLDV: 45 MM HG (ref 40–50)
PCO2 BLDV: 48 MM HG (ref 40–50)
PCO2 BLDV: 49 MM HG (ref 40–50)
PCO2 BLDV: 51 MM HG (ref 40–50)
PCO2 BLDV: NORMAL MM HG (ref 40–50)
PH BLD: 7.38 PH (ref 7.35–7.45)
PH BLD: 7.38 PH (ref 7.35–7.45)
PH BLD: 7.43 PH (ref 7.35–7.45)
PH BLDV: 7.33 PH (ref 7.32–7.43)
PH BLDV: 7.35 PH (ref 7.32–7.43)
PH BLDV: 7.36 PH (ref 7.32–7.43)
PH BLDV: 7.39 PH (ref 7.32–7.43)
PH BLDV: 7.4 PH (ref 7.32–7.43)
PH BLDV: 7.41 PH (ref 7.32–7.43)
PH BLDV: NORMAL PH (ref 7.32–7.43)
PH UR STRIP: 5 PH (ref 5–7)
PHOSPHATE SERPL-MCNC: 3.3 MG/DL (ref 2.5–4.5)
PLATELET # BLD AUTO: 102 10E9/L (ref 150–450)
PLATELET # BLD AUTO: 102 10E9/L (ref 150–450)
PLATELET # BLD AUTO: 106 10E9/L (ref 150–450)
PLATELET # BLD AUTO: 107 10E9/L (ref 150–450)
PO2 BLD: 102 MM HG (ref 80–105)
PO2 BLD: 128 MM HG (ref 80–105)
PO2 BLD: 92 MM HG (ref 80–105)
PO2 BLDV: 30 MM HG (ref 25–47)
PO2 BLDV: 34 MM HG (ref 25–47)
PO2 BLDV: 34 MM HG (ref 25–47)
PO2 BLDV: 36 MM HG (ref 25–47)
PO2 BLDV: 37 MM HG (ref 25–47)
PO2 BLDV: 37 MM HG (ref 25–47)
PO2 BLDV: NORMAL MM HG (ref 25–47)
POTASSIUM SERPL-SCNC: 3.9 MMOL/L (ref 3.4–5.3)
POTASSIUM SERPL-SCNC: 3.9 MMOL/L (ref 3.4–5.3)
POTASSIUM SERPL-SCNC: 4.1 MMOL/L (ref 3.4–5.3)
POTASSIUM SERPL-SCNC: 4.2 MMOL/L (ref 3.4–5.3)
PROT SERPL-MCNC: 5.2 G/DL (ref 6.8–8.8)
RBC # BLD AUTO: 2.98 10E12/L (ref 4.4–5.9)
RBC # BLD AUTO: 3.02 10E12/L (ref 4.4–5.9)
RBC # BLD AUTO: 3.16 10E12/L (ref 4.4–5.9)
RBC # BLD AUTO: 3.19 10E12/L (ref 4.4–5.9)
RBC #/AREA URNS AUTO: 4 /HPF (ref 0–2)
SODIUM SERPL-SCNC: 144 MMOL/L (ref 133–144)
SOURCE: ABNORMAL
SP GR UR STRIP: 1.01 (ref 1–1.03)
SPECIMEN EXP DATE BLD: NORMAL
SPECIMEN SOURCE: ABNORMAL
SPECIMEN SOURCE: ABNORMAL
SQUAMOUS #/AREA URNS AUTO: 1 /HPF (ref 0–1)
TRANS CELLS #/AREA URNS HPF: <1 /HPF (ref 0–1)
UROBILINOGEN UR STRIP-MCNC: NORMAL MG/DL (ref 0–2)
WBC # BLD AUTO: 10.9 10E9/L (ref 4–11)
WBC # BLD AUTO: 11.6 10E9/L (ref 4–11)
WBC # BLD AUTO: 9.1 10E9/L (ref 4–11)
WBC # BLD AUTO: 9.6 10E9/L (ref 4–11)
WBC #/AREA URNS AUTO: 2 /HPF (ref 0–5)

## 2018-12-07 PROCEDURE — 25000132 ZZH RX MED GY IP 250 OP 250 PS 637: Performed by: SURGERY

## 2018-12-07 PROCEDURE — 97535 SELF CARE MNGMENT TRAINING: CPT | Mod: GO | Performed by: OCCUPATIONAL THERAPIST

## 2018-12-07 PROCEDURE — 82330 ASSAY OF CALCIUM: CPT | Performed by: THORACIC SURGERY (CARDIOTHORACIC VASCULAR SURGERY)

## 2018-12-07 PROCEDURE — 71045 X-RAY EXAM CHEST 1 VIEW: CPT

## 2018-12-07 PROCEDURE — 80076 HEPATIC FUNCTION PANEL: CPT | Performed by: SURGERY

## 2018-12-07 PROCEDURE — 85027 COMPLETE CBC AUTOMATED: CPT | Performed by: SURGERY

## 2018-12-07 PROCEDURE — 85730 THROMBOPLASTIN TIME PARTIAL: CPT | Performed by: SURGERY

## 2018-12-07 PROCEDURE — 25000132 ZZH RX MED GY IP 250 OP 250 PS 637: Performed by: HOSPITALIST

## 2018-12-07 PROCEDURE — 25000128 H RX IP 250 OP 636: Performed by: SURGERY

## 2018-12-07 PROCEDURE — 85610 PROTHROMBIN TIME: CPT | Performed by: SURGERY

## 2018-12-07 PROCEDURE — 82805 BLOOD GASES W/O2 SATURATION: CPT | Performed by: THORACIC SURGERY (CARDIOTHORACIC VASCULAR SURGERY)

## 2018-12-07 PROCEDURE — 83735 ASSAY OF MAGNESIUM: CPT | Performed by: SURGERY

## 2018-12-07 PROCEDURE — 84100 ASSAY OF PHOSPHORUS: CPT | Performed by: SURGERY

## 2018-12-07 PROCEDURE — 80048 BASIC METABOLIC PNL TOTAL CA: CPT | Performed by: SURGERY

## 2018-12-07 PROCEDURE — 87205 SMEAR GRAM STAIN: CPT | Performed by: STUDENT IN AN ORGANIZED HEALTH CARE EDUCATION/TRAINING PROGRAM

## 2018-12-07 PROCEDURE — 86850 RBC ANTIBODY SCREEN: CPT | Performed by: SURGERY

## 2018-12-07 PROCEDURE — 40000275 ZZH STATISTIC RCP TIME EA 10 MIN

## 2018-12-07 PROCEDURE — 40000014 ZZH STATISTIC ARTERIAL MONITORING DAILY

## 2018-12-07 PROCEDURE — 86900 BLOOD TYPING SEROLOGIC ABO: CPT | Performed by: SURGERY

## 2018-12-07 PROCEDURE — 20000004 ZZH R&B ICU UMMC

## 2018-12-07 PROCEDURE — 40000133 ZZH STATISTIC OT WARD VISIT: Performed by: OCCUPATIONAL THERAPIST

## 2018-12-07 PROCEDURE — 83605 ASSAY OF LACTIC ACID: CPT | Performed by: THORACIC SURGERY (CARDIOTHORACIC VASCULAR SURGERY)

## 2018-12-07 PROCEDURE — 85520 HEPARIN ASSAY: CPT | Performed by: NURSE PRACTITIONER

## 2018-12-07 PROCEDURE — 99291 CRITICAL CARE FIRST HOUR: CPT | Mod: GC | Performed by: INTERNAL MEDICINE

## 2018-12-07 PROCEDURE — 25000128 H RX IP 250 OP 636: Performed by: THORACIC SURGERY (CARDIOTHORACIC VASCULAR SURGERY)

## 2018-12-07 PROCEDURE — 40000196 ZZH STATISTIC RAPCV CVP MONITORING

## 2018-12-07 PROCEDURE — 81001 URINALYSIS AUTO W/SCOPE: CPT | Performed by: STUDENT IN AN ORGANIZED HEALTH CARE EDUCATION/TRAINING PROGRAM

## 2018-12-07 PROCEDURE — 40000048 ZZH STATISTIC DAILY SWAN MONITORING

## 2018-12-07 PROCEDURE — 36415 COLL VENOUS BLD VENIPUNCTURE: CPT | Performed by: HOSPITALIST

## 2018-12-07 PROCEDURE — 94003 VENT MGMT INPAT SUBQ DAY: CPT

## 2018-12-07 PROCEDURE — 87040 BLOOD CULTURE FOR BACTERIA: CPT | Performed by: HOSPITALIST

## 2018-12-07 PROCEDURE — 25000132 ZZH RX MED GY IP 250 OP 250 PS 637: Performed by: THORACIC SURGERY (CARDIOTHORACIC VASCULAR SURGERY)

## 2018-12-07 PROCEDURE — 00000146 ZZHCL STATISTIC GLUCOSE BY METER IP

## 2018-12-07 PROCEDURE — 86901 BLOOD TYPING SEROLOGIC RH(D): CPT | Performed by: SURGERY

## 2018-12-07 PROCEDURE — 97530 THERAPEUTIC ACTIVITIES: CPT | Mod: GO | Performed by: OCCUPATIONAL THERAPIST

## 2018-12-07 PROCEDURE — 97166 OT EVAL MOD COMPLEX 45 MIN: CPT | Mod: GO | Performed by: OCCUPATIONAL THERAPIST

## 2018-12-07 PROCEDURE — 25000132 ZZH RX MED GY IP 250 OP 250 PS 637: Performed by: NURSE PRACTITIONER

## 2018-12-07 PROCEDURE — 85520 HEPARIN ASSAY: CPT | Performed by: SURGERY

## 2018-12-07 PROCEDURE — 99233 SBSQ HOSP IP/OBS HIGH 50: CPT | Mod: GC | Performed by: INTERNAL MEDICINE

## 2018-12-07 PROCEDURE — C9113 INJ PANTOPRAZOLE SODIUM, VIA: HCPCS | Performed by: THORACIC SURGERY (CARDIOTHORACIC VASCULAR SURGERY)

## 2018-12-07 RX ORDER — DEXTROSE MONOHYDRATE 25 G/50ML
25-50 INJECTION, SOLUTION INTRAVENOUS
Status: DISCONTINUED | OUTPATIENT
Start: 2018-12-07 | End: 2018-12-18 | Stop reason: HOSPADM

## 2018-12-07 RX ORDER — HEPARIN SODIUM 10000 [USP'U]/100ML
0-3500 INJECTION, SOLUTION INTRAVENOUS CONTINUOUS
Status: DISCONTINUED | OUTPATIENT
Start: 2018-12-07 | End: 2018-12-12

## 2018-12-07 RX ORDER — NICOTINE POLACRILEX 4 MG
15-30 LOZENGE BUCCAL
Status: DISCONTINUED | OUTPATIENT
Start: 2018-12-07 | End: 2018-12-18 | Stop reason: HOSPADM

## 2018-12-07 RX ADMIN — SODIUM CHLORIDE 600 MG: 9 INJECTION, SOLUTION INTRAVENOUS at 10:21

## 2018-12-07 RX ADMIN — SENNOSIDES AND DOCUSATE SODIUM 2 TABLET: 8.6; 5 TABLET ORAL at 08:27

## 2018-12-07 RX ADMIN — PANTOPRAZOLE SODIUM 40 MG: 40 INJECTION, POWDER, FOR SOLUTION INTRAVENOUS at 08:25

## 2018-12-07 RX ADMIN — FLUCONAZOLE 200 MG: 2 INJECTION, SOLUTION INTRAVENOUS at 10:49

## 2018-12-07 RX ADMIN — MAGNESIUM SULFATE 2 G: 2 INJECTION INTRAVENOUS at 00:08

## 2018-12-07 RX ADMIN — VANCOMYCIN HYDROCHLORIDE 1500 MG: 10 INJECTION, POWDER, LYOPHILIZED, FOR SOLUTION INTRAVENOUS at 08:26

## 2018-12-07 RX ADMIN — CALCIUM GLUCONATE 3 G: 98 INJECTION, SOLUTION INTRAVENOUS at 01:52

## 2018-12-07 RX ADMIN — ACETAMINOPHEN 650 MG: 325 SOLUTION ORAL at 08:09

## 2018-12-07 RX ADMIN — SENNOSIDES AND DOCUSATE SODIUM 2 TABLET: 8.6; 5 TABLET ORAL at 21:00

## 2018-12-07 RX ADMIN — ASPIRIN 81 MG CHEWABLE TABLET 81 MG: 81 TABLET CHEWABLE at 08:26

## 2018-12-07 RX ADMIN — ATORVASTATIN CALCIUM 10 MG: 10 TABLET, FILM COATED ORAL at 08:26

## 2018-12-07 RX ADMIN — EPINEPHRINE 0.07 MCG/KG/MIN: 1 INJECTION PARENTERAL at 06:56

## 2018-12-07 RX ADMIN — MUPIROCIN 1 G: 20 OINTMENT TOPICAL at 08:28

## 2018-12-07 RX ADMIN — POLYETHYLENE GLYCOL 3350 17 G: 17 POWDER, FOR SOLUTION ORAL at 21:00

## 2018-12-07 RX ADMIN — HEPARIN SODIUM 800 UNITS/HR: 10000 INJECTION, SOLUTION INTRAVENOUS at 11:30

## 2018-12-07 RX ADMIN — HEPARIN SODIUM 1100 UNITS/HR: 10000 INJECTION, SOLUTION INTRAVENOUS at 22:01

## 2018-12-07 RX ADMIN — Medication 0.5 MG: at 22:00

## 2018-12-07 RX ADMIN — POLYETHYLENE GLYCOL 3350 17 G: 17 POWDER, FOR SOLUTION ORAL at 08:27

## 2018-12-07 RX ADMIN — LEVOFLOXACIN 500 MG: 5 INJECTION, SOLUTION INTRAVENOUS at 08:27

## 2018-12-07 RX ADMIN — THIAMINE HCL TAB 100 MG 100 MG: 100 TAB at 08:26

## 2018-12-07 RX ADMIN — MUPIROCIN 1 G: 20 OINTMENT TOPICAL at 22:02

## 2018-12-07 RX ADMIN — PROPOFOL 15 MCG/KG/MIN: 10 INJECTION, EMULSION INTRAVENOUS at 05:11

## 2018-12-07 RX ADMIN — POTASSIUM CHLORIDE 20 MEQ: 29.8 INJECTION, SOLUTION INTRAVENOUS at 17:45

## 2018-12-07 ASSESSMENT — ACTIVITIES OF DAILY LIVING (ADL)
ADLS_ACUITY_SCORE: 12
ADLS_ACUITY_SCORE: 11
ADLS_ACUITY_SCORE: 11
ADLS_ACUITY_SCORE: 12
IADL_COMMENTS: OT: PT WAS IND AND SPOUSE CAN A PRN

## 2018-12-07 ASSESSMENT — PAIN DESCRIPTION - DESCRIPTORS
DESCRIPTORS: ACHING
DESCRIPTORS: ACHING

## 2018-12-07 NOTE — PLAN OF CARE
Problem: Ventricular Assist Device (Adult)  Goal: Signs and Symptoms of Listed Potential Problems Will be Absent, Minimized or Managed (Ventricular Assist Device)  Signs and symptoms of listed potential problems will be absent, minimized or managed by discharge/transition of care (reference Ventricular Assist Device (Adult) CPG).   Outcome: Improving  Pt received 500 ml albumin at the beginning of the shift.  Able to wean norepi to 0.03 mcgs, vaso off and milrinone to 0.125.  Propofol 15-20 mcgs pt follows simple commands. Another 500 ml of albumin given at the end of this shift for low bp and PIs.  Family at the bedside and updated on POC.  Will continue to monitor.

## 2018-12-07 NOTE — PROGRESS NOTES
CV ICU PROGRESS NOTE  December 7, 2018    CO-MORBIDITIES:   Nocturnal oxygen desaturation  (primary encounter diagnosis)  Acute on chronic systolic heart failure (H)    ASSESSMENT: Danielito Chu is a 63 year-old male with a PMHx s/f SHIRLEY, HTN, CHF (LVEF 15%), NIDDM2 who underwent LVAD placement on 12/5/18 with Dr. Silva.    TODAY'S PROGRESS:   - PST to extubate  - Stop milrinone today  - heparin to LIH today  - wean pressors as able  - UA, BCx, Sputum Cx  - SSI    PLAN:   Neuro/ pain/ sedation:  - Monitor neurological status. Notify the MD for any acute changes in exam.  - Pain: Fentanyl gtt, Oxy/Dilaudid prn, Acetaminophen 975mg TID scheduled, Gabapentin 300mg BID x3 days, Lidoderm.  - Sedation: None     Pulmonary care:   - Supplemental oxygen to keep saturation above 92 %.  - Mechanically ventilated with the following settings: 14/480/100/5  - PST to extubation  #SHIRLEY  - CPAP overnight as tolerated   #pHTN     Cardiovascular:    #h/o HTN  - Holding PTA Anti-HTN meds given vasopressor requirements.  #CHF s/p Heartmate III LVAD placement 12/5  - Monitor hemodynamic status.   - Pressors: Epi  - ASA 81mg  - Atorvastatin 10mg  - Stop Milrinone     GI care:   - NPO. Bedside swallow once extubated and ADAT  - If not able to extubate, will initiate TFs  - Bowel regimen: Miralax, Senna-Colace       Fluids/ Electrolytes/ Nutrition:   - ICU electrolyte replacement protocol  - Nutrition consulted. Appreciate recs  - q6h BMP     Renal/ Fluid Balance:    - Will monitor intake and output.  - Net even       Endocrine:  #NIDDM2    - SSI       ID/ Antibiotics:  - Completing a course of periop anti-microbials: Fluconazole, Levaquin, Rifampin, Vancomycin (48h post-op)  - UA with reflex Ucx, Bcx, follow WBC, may consider broadening abx if signs of infection are indeed present.       Heme:     - hgb stable  - q6h CBC       Prophylaxis:    - Mechanical prophylaxis for DVT.  - Low intensity heparin       MSK:    - PT and OT consulted.  Appreciate recs.       Lines/ tubes/ drains:  - ETT  - RIJ MAC with [place]  - Valentine  - A-line  - CTx4 (left pleural, meds x2, pericardial)  - PIVx1       Disposition:  - CV ICU.    Nayely Stiles MD  General Surgery, PGY-3  Pg [phone]      ====================================    SUBJECTIVE:   No acute events overnight. Following commands when off sedation. Fevers overnight. Decreasing pressor requirements.     OBJECTIVE:   1. VITAL SIGNS:   Temp:  [99.9  F (37.7  C)-101.8  F (38.8  C)] 101.7  F (38.7  C)  Heart Rate:  [] 107  Resp:  [12-14] 12  MAP:  [55 mmHg-75 mmHg] 74 mmHg  Arterial Line BP: (58-92)/(51-69) 84/64  FiO2 (%):  [40 %] 40 %  SpO2:  [95 %-100 %] 98 %  Ventilation Mode: CMV/AC  (Continuous Mandatory Ventilation/ Assist Control)  FiO2 (%): 40 %  Rate Set (breaths/minute): 14 breaths/min  Tidal Volume Set (mL): 480 mL  PEEP (cm H2O): 5 cmH2O  Oxygen Concentration (%): 40 %  Resp: 12 (PS 7/5)    2. INTAKE/ OUTPUT:   I/O last 3 completed shifts:  In: 2986.16 [I.V.:2276.16; NG/GT:210]  Out: 2145 [Urine:1400; Emesis/NG output:240; Chest Tube:505]    3. PHYSICAL EXAMINATION:   General: sedated adult male lying in bed  Neuro: Sedated, arousable to voice and follows commands   Resp: Intubated and mechanically ventilated  CV: RRR  Abdomen: Soft, Non-distended, Non-tender  Incisions: c/d/i  Extremities: warm and well perfused    4. INVESTIGATIONS:   Arterial Blood Gases     Recent Labs  Lab 12/07/18  1020 12/07/18  0328 12/06/18  2212 12/06/18  0336   PH 7.38 7.43 7.44 7.47*   PCO2 44 38 35 35   PO2 102 92 83 95   HCO3 26 25 24 26     Complete Blood Count     Recent Labs  Lab 12/07/18  0403 12/06/18  2218 12/06/18  1854 12/06/18  1658   WBC 9.1 9.1 8.4 8.3   HGB 9.0* 9.2* 8.5* 7.8*   * 102* 86* 80*     Basic Metabolic Panel    Recent Labs  Lab 12/07/18  0403 12/06/18  2218 12/06/18  1658 12/06/18  1021    144 144 144   POTASSIUM 4.1 4.2 4.1 3.4   CHLORIDE 110* 111* 112* 113*   CO2 26 26 25  22   BUN 14 13 13 11   CR 0.94 0.88 0.88 0.78   * 100* 105* 99     Liver Function Tests    Recent Labs  Lab 12/07/18  0403 12/06/18  1148 12/06/18  0336 12/05/18  1455  12/04/18  0800 12/03/18  1850 11/30/18  2349   *  --   --   --   --  14 12 15   ALT 18  --   --   --   --  13 14 15   ALKPHOS 72  --   --   --   --  97 99 90   BILITOTAL 5.8*  --   --   --   --  2.0* 2.2* 2.9*   ALBUMIN 3.1*  --   --   --   --  3.2* 3.2* 3.3*   INR 2.05* 2.09* 1.81* 1.67*  < >  --   --   --    < > = values in this interval not displayed.  Pancreatic Enzymes    Recent Labs  Lab 11/30/18  1130   LIPASE 51*   AMYLASE 20*     Coagulation Profile    Recent Labs  Lab 12/07/18  0403 12/06/18  1148 12/06/18  0336 12/05/18  1455 12/05/18  1220   INR 2.05* 2.09* 1.81* 1.67* 2.03*   PTT 60*  --  38* 60* 35         5. RADIOLOGY:   Recent Results (from the past 24 hour(s))   XR Chest Port 1 View    Narrative    Vascular exam Exam: XR CHEST PORT 1 VW, 12/7/2018 1:00 AM    Indication: Interval change;     Comparison: X-ray 12/6/2018    Findings:   Frontal x-ray of the chest. Left ventricular assist device is  partially visualized. [place]-Soy catheter with tip projecting over the  main pulmonary artery. Endotracheal tube tip projects over the  mid/distal thoracic trachea. Enteric tube is coursing below the  diaphragm. Unchanged mediastinal drain. Right upper extremity PICC tip  projecting the cavoatrial junction. Left pectoral cardiac device with  intracardiac leads. Persistent streaky bibasilar opacities. Unchanged  mediastinal silhouette. No pneumothorax or large pleural effusion.      Impression    Impression:   1. Persistent interstitial pulmonary opacities representing a  combination of pulmonary edema and atelectasis.  2. Stable support devices.    I have personally reviewed the examination and initial interpretation  and I agree with the findings.    OTILIA STARR MD       =========================================

## 2018-12-07 NOTE — PROGRESS NOTES
" 12/07/18 1500   Quick Adds   Type of Visit Initial Occupational Therapy Evaluation   Living Environment   Lives With significant other   Living Arrangements house   Home Accessibility stairs to enter home;bed and bath on same level   Number of Stairs to Enter Home 4   Number of Stairs Within Home (basement that pt does not need to use)   Stair Railings at Home outside, present on left side   Transportation Available family or friend will provide   Living Environment Comment OT: Pt's s/o giving PLOF 2/2 pt's fatigue level, pt's s/o reporting that they live in a [place] w/ steep driveway to enter to stairs and pt has difficulty at baseline asending path to get to door.    Self-Care   Dominant Hand right   Usual Activity Tolerance good   Current Activity Tolerance moderate   Regular Exercise no   Equipment Currently Used at Home none   Functional Level Prior   Ambulation 0-->independent   Transferring 0-->independent   Toileting 0-->independent   Bathing 0-->independent   Dressing 0-->independent   Eating 0-->independent   Communication 0-->understands/communicates without difficulty   Swallowing 0-->swallows foods/liquids without difficulty   Cognition 0 - no cognition issues reported   Fall history within last six months no   General Information   Onset of Illness/Injury or Date of Surgery - Date 11/28/18   Referring Physician Andrei Silva MD   Patient/Family Goals Statement not stated   Additional Occupational Profile Info/Pertinent History of Current Problem per chart pt \" is a 63 year-old male with a PMHx s/f SHIRLEY, HTN, CHF (LVEF 15%), NIDDM2 who underwent LVAD placement on 12/5/18\"   Precautions/Limitations sternal precautions;abdominal precautions;fall precautions  (LVAD)   Weight-Bearing Status - LUE (10# lifting restriction)   Weight-Bearing Status - RUE (10# lifting restriction)   Cognitive Status Examination   Orientation person  (Pt stating he was in [place], stating year is 2018)   Level of " Consciousness lethargic/somnolent   Able to Follow Commands mild impairment   Personal Safety (Cognitive) mild impairment   Memory impaired   Attention Sustained attention impaired   Organization/Problem Solving Sequencing impaired   Executive Function Planning ability impaired;Cognitive flexibility impaired;Self awareness/monitoring impaired   Visual Perception   Visual Perception No deficits were identified   Sensory Examination   Sensory Comments OT: No concerns at this time   Range of Motion (ROM)   ROM Comment OT: BUE WFL w/in precautions   Strength   Strength Comments OT: NT formally 2/2 post surgical precautions, overall deconditioned   Hand Strength   Hand Strength Comments OT:  WFL   Muscle Tone Assessment   Muscle Tone Comments OT: Overall deconditioned   Mobility   Bed Mobility Comments OT: Max A x2   Transfer Skills   Transfer Comments OT: Max A x2 w/ 3rd person for line mgmt   Balance   Balance Comments OT: Mod A  to min A briefly sitting EOB balance   Instrumental Activities of Daily Living (IADL)   IADL Comments OT: Pt was ind and spouse can A prn   Activities of Daily Living Analysis   Impairments Contributing to Impaired Activities of Daily Living balance impaired;cognition impaired;post surgical precautions;strength decreased   General Therapy Interventions   Planned Therapy Interventions ADL retraining;IADL retraining;balance training;bed mobility training;cognition;strengthening;transfer training;home program guidelines;progressive activity/exercise   Clinical Impression   Criteria for Skilled Therapeutic Interventions Met yes, treatment indicated   OT Diagnosis decreased ind in ADLS/IADLS   Influenced by the following impairments generalized weakness and post surgical precautions   Assessment of Occupational Performance 3-5 Performance Deficits   Identified Performance Deficits decreased ind in ADLS/IADLS   Clinical Decision Making (Complexity) Moderate complexity   Therapy Frequency daily  "  Predicted Duration of Therapy Intervention (days/wks) 12/28/18   Anticipated Discharge Disposition Transitional Care Facility;Acute Rehabilitation Facility   Risks and Benefits of Treatment have been explained. Yes   Patient, Family & other staff in agreement with plan of care Yes   [facility] TM \"6 Clicks\"   2016, Trustees of [facility], under license to [employer].  All rights reserved.   6 Clicks Short Forms Daily Activity Inpatient Short Form   [facility]  \"6 Clicks\" Daily Activity Inpatient Short Form   1. Putting on and taking off regular lower body clothing? 1 - Total   2. Bathing (including washing, rinsing, drying)? 2 - A Lot   3. Toileting, which includes using toilet, bedpan or urinal? 2 - A Lot   4. Putting on and taking off regular upper body clothing? 2 - A Lot   5. Taking care of personal grooming such as brushing teeth? 2 - A Lot   6. Eating meals? 1 - Total   Daily Activity Raw Score (Score out of 24.Lower scores equate to lower levels of function) 10   Total Evaluation Time   Total Evaluation Time (Minutes) 6     "

## 2018-12-07 NOTE — PROGRESS NOTES
Pt was extubated at 1315 and placed on a 3 L/min nasal cannula.  Pt had a good productive cough.  Breath sounds were clear bilaterally and diminished in the bases. Pt seemed to be breathing comfortably.  There were no complications.

## 2018-12-07 NOTE — PROGRESS NOTES
Care Coordinator Progress Note    Admission Date/Time:  11/28/2018  Attending MD:  Andrei Silva, *    Data  Chart reviewed, discussed with interdisciplinary team.   Patient was admitted for:    Nocturnal oxygen desaturation  Acute on chronic systolic heart failure (H).    Assessment  Pt had LVAD placement done on 12/5 and transferred to ICU.  Pt is vented and doing PS.  RNCC will cont to follow plan of care     Plan  Anticipated Discharge Date:  TBD.  Anticipated Discharge Plan:   TBD.  RNCC will cont to follow plan of care.      Veronica Deluca RN, PHN, BSN  4A and 4E/ ICU  Care Coordinator  Phone: [phone]  Pager: [phone]

## 2018-12-07 NOTE — PLAN OF CARE
Problem: Patient Care Overview  Goal: Plan of Care/Patient Progress Review  1. Pt will be hemodynamically stable.  2. Pt and family will verbalize understanding of plan of care.  3. Pt will remain free of falls  4. Pain will be under control or minimal   Discharge Planner OT   Patient plan for discharge: not stated, open to rehab after education  Current status: Pt completed functional transfers max A x2 bed mobility, mod-min A sitting EOB, max A x2 STS transfer w/ 3rd person for lines. Pt's VSS throughout during transfer on 3L NC. Pt's HR in 100s throughout OT tx session, after sitting in chair for ~5min pt's HR suddenly jumped to mid 130s w/ SVTs and paced lines noted on monitor, pt's RN outside of room and immediately notified of vitals. Pt's BP stable per ART line in 80s/60s, jumping to 110s/80s after increase in HR. MD present outside of room also and informed of vitals. Pt w/ RN when OT left the room  Barriers to return to prior living situation: medical status  Recommendations for discharge: rehab  Rationale for recommendations: to increase ind in ADLS/IADLS       Entered by: Zeny Rivera 12/07/2018 3:45 PM

## 2018-12-07 NOTE — PROGRESS NOTES
ADVANCED HEART FAILURE PROGRESS NOTE    SUBJECTIVE:  No acute overnight events. Weaning down on pressor/inotrope requirements. Extubated this afternoon.    ROS otherwise negative.    OBJECTIVE:  Vital signs:  Temp: (S) 101.8  F (38.8  C) (PRN Tylenol Given. Fan turned ON.) Temp  Min: 99.9  F (37.7  C)  Max: 101.8  F (38.8  C)  Heart Rate: 104 Heart Rate  Min: 82  Max: 112   No data recorded.  No data recorded.    Resp: 14 Resp  Min: 14  Max: 14  SpO2: 98 % SpO2  Min: 95 %  Max: 100 %       HM3: speed 5200 RPM, Flow 4.2 LPM, PI 3.8, Power 3.8 W      Intake/Output Summary (Last 24 hours) at 12/07/18 0644  Last data filed at 12/07/18 0600   Gross per 24 hour   Intake          2986.16 ml   Output             2145 ml   Net           841.16 ml       Vitals:    12/05/18 0511 12/06/18 0200 12/07/18 0200   Weight: 81.9 kg (180 lb 9.6 oz) 86.6 kg (190 lb 14.7 oz) 87.6 kg (193 lb 2 oz)         Physical Exam:  General: sitting up in chair, appear tired but comfortable  Resp: scattered rales, breathing comfortably  CV: regular, tachycardic, VAD hum, no JVD, incisions clean  Abdomen: Soft, Non-distended, Non-tender  Extremities: warm and well perfused, no edema       Medications:    dexmedetomidine Stopped (12/05/18 2030)     EPINEPHrine IV infusion ADULT 0.07 mcg/kg/min (12/07/18 0700)     fentaNYL 100 mcg/hr (12/07/18 0700)     heparin 500 Units/hr (12/07/18 0700)     milrinone 0.125 mcg/kg/min (12/07/18 0700)     norepinephrine Stopped (12/06/18 2115)     propofol (DIPRIVAN) infusion 15 mcg/kg/min (12/07/18 0700)     BETA BLOCKER NOT PRESCRIBED       vasopressin (PITRESSIN) infusion ADULT (40 mL) 1.5 Units/hr (12/07/18 0700)       Current Facility-Administered Medications   Medication Dose Route Frequency     aspirin  81 mg Oral Daily     atorvastatin  10 mg Oral Daily     fluconazole  200 mg Intravenous Q24H     lactated ringers  250 mL Intravenous Once     lactated ringers  500 mL Intravenous Once     levofloxacin  500 mg  Intravenous Q24H     mupirocin  1 g Both Nostrils BID     pantoprazole (PROTONIX) IV  40 mg Intravenous Daily     polyethylene glycol  17 g Oral BID     rifampin  600 mg Intravenous Q24H     senna-docusate  2 tablet Oral BID     sodium chloride (PF)  3 mL Intracatheter Q8H     vancomycin (VANCOCIN) IV  1,500 mg Intravenous Q12H     vitamin B1  100 mg Oral Daily         Labs:  CMP  Recent Labs  Lab 12/07/18  0403 12/06/18  2218  12/04/18  0800    144  < > 137   POTASSIUM 4.1 4.2  < > 3.2*   CHLORIDE 110* 111*  < > 101   CO2 26 26  < > 25   BUN 14 13  < > 24   CR 0.94 0.88  < > 1.17   ASHLEE 8.9 8.1*  < > 8.4*   MAG 2.7* 2.0  < > 2.1   PHOS 3.3 3.2  < >  --    * 100*  < > 92   ALKPHOS 72  --   --  97   BILITOTAL 5.8*  --   --  2.0*   *  --   --  14   ALT 18  --   --  13   < > = values in this interval not displayed.  BLOOD GAS  Recent Labs  Lab 12/07/18  0328 12/06/18  2212   PH 7.43 7.44   PCO2 38 35   PO2 92 83     CBC  Recent Labs  Lab 12/07/18  0403 12/06/18  2218   WBC 9.1 9.1   HGB 9.0* 9.2*   HCT 29.3* 29.8*   * 102*     COAG  Recent Labs  Lab 12/07/18  0403 12/06/18  1148 12/06/18  0336   INR 2.05* 2.09* 1.81*   PTT 60*  --  38*         ASSESSMENT/PLAN:  Danielito Chu is a 63 year-old male with a PMHx s/f SHIRLEY, HTN, CHF (LVEF 15%), NIDDM2 who underwent HeartMate III LVAD placement on 12/5/18 with Dr. Silva.     #h/o HTN  #CHF s/p Heartmate III LVAD placement 12/5  - wean pressors; now off milrinone, remains on low dose epinepherine  - RV had mild dysfunction prior to LVAD.  - extubated 12/7  - on Aspirin 81mg  - advancing to low intensity heparin drip, holding warfarin for now  - continue LVAD speed 5200 RPM for now, will increase as tolerated  - continues to have intermittent fevers, cultures drawn today, on post-op antibiotic coverage    Patient seen and evaluated with the attending cardiologist Dr. Rene.    Moisés Rockwell MD  Advanced Heart Failure Fellow  [phone]    I have  reviewed today's vital signs, notes, medications, labs and imaging.  I have also seen and examined the patient and agree with the findings and plan as outlined above.  Pt with febrile episodes and now extubated.  POD#2 S/P HM3 now off flolan and on vanc, fluconazol, rifampin and levaquin. Lungs clear and mechanical LVAD hum.  CVP 11, CI 3.2,  and 1.7l UO.  Assessment: Pt with HM3 now with febrile episodes but broadly covered.  Will continue to follow and keep abx.  Pt seen X3 with total critical care time 25 min.     Bennett Rene MD, PhD  Professor, Heart Failure and Cardiac Transplantation  [facility]

## 2018-12-07 NOTE — PLAN OF CARE
Problem: Patient Care Overview  Goal: Plan of Care/Patient Progress Review  1. Pt will be hemodynamically stable.  2. Pt and family will verbalize understanding of plan of care.  3. Pt will remain free of falls  4. Pain will be under control or minimal   Outcome: No Change  D/I:  Tmax up to 101.8 F via pulmonary catheter at end of shift. Fan turned On. MD notified. Venipuncture BC x 2 ordered. PRN Tylenol ordered.  Rhythm Sinus tachycardia low 100's. CVP= 12, 11 & 11.  PA = 35/21, 33/21 & 34/19.  Oxyhgb = 62%, 65% & 63%. MARILYNN CI = 3.1, 3.1 & 2.9 and SVR = 663, 706 & 765. Epi Gtt weaned from 0.08 to 0.07 mcg/kg/min. Levo Gtt weaned from 0.03 to off.  Vaso Gtt @ 1.5 units/hr. Milrinone Gtt @ 0.125 mcg/kg/min.  Calcium Gluconate 3 gm given this shift for low Ionized Ca+ but still unable to wean pressors further after calcium level back up to normal.  Heparin Gtt @ flat rate @ 500 units/hr.  Propofol Gtt @ 15 mcg/kg/min.  Fentanyl Gtt @ 100 mcg/hr. Chest tube output combined averaging 25 ml/hr. Urine output averaging 50 - 60 ml/hr. LVAD flow = 4.2 - 4.3  PI = 3.5 - 3.9 Speed = 5200. Power = 3.8 - 4.    A/P:  Increasing fever with low SVR suspicious for infection although Pt on multiple antibiotics ( Vancomycin, Levaquin, Fluconazole and Rifampin ).  Blood cultures pending. Nursing to continue to monitor temp and hemodynamics. Plan to wean pressors further if able keeping MAP > 65.

## 2018-12-07 NOTE — PROGRESS NOTES
CVTS PROGRESS NOTE  December 7, 2018    CO-MORBIDITIES:   Nocturnal oxygen desaturation  (primary encounter diagnosis)  Acute on chronic systolic heart failure (H)    ASSESSMENT: Danielito Chu is a 63 year-old male with a PMHx s/f SHIRLEY, HTN, CHF (LVEF 15%), NIDDM2 who underwent LVAD placement on 12/5/18 with Dr. Silva.    TODAY'S PROGRESS:   - PST to extubate  - Stop milrinone today  - heparin to LIH today  - wean pressors as able  - UA, BCx, Sputum Cx  - SSI    PLAN:   Neuro/ pain/ sedation:  - Monitor neurological status. Notify the MD for any acute changes in exam.  - Pain: Fentanyl gtt, Oxy/Dilaudid prn, Acetaminophen 975mg TID scheduled, Gabapentin 300mg BID x3 days, Lidoderm.  - Sedation: None     Pulmonary care:   - Supplemental oxygen to keep saturation above 92 %.  - Mechanically ventilated with the following settings: 14/480/100/5  - PST to extubation  #SHIRLEY  - CPAP overnight as tolerated   #pHTN     Cardiovascular:    #h/o HTN  - Holding PTA Anti-HTN meds given vasopressor requirements.  #CHF s/p Heartmate III LVAD placement 12/5  - Monitor hemodynamic status.   - Pressors: Epi  - ASA 81mg  - Atorvastatin 10mg  - Stop Milrinone     GI care:   - NPO. Bedside swallow once extubated and ADAT  - If not able to extubate, will initiate TFs  - Bowel regimen: Miralax, Senna-Colace       Fluids/ Electrolytes/ Nutrition:   - ICU electrolyte replacement protocol  - Nutrition consulted. Appreciate recs  - q6h BMP     Renal/ Fluid Balance:    - Will monitor intake and output.  - Net even       Endocrine:  #NIDDM2    - SSI       ID/ Antibiotics:  - Completing a course of periop anti-microbials: Fluconazole, Levaquin, Rifampin, Vancomycin (48h post-op)  - UA with reflex Ucx, Bcx, follow WBC, may consider broadening abx if signs of infection are indeed present.       Heme:     - hgb stable  - q6h CBC       Prophylaxis:    - Mechanical prophylaxis for DVT.  - Low intensity heparin       MSK:    - PT and OT consulted.  Appreciate recs.       Lines/ tubes/ drains:  - ETT  - RIJ MAC with [place]  - Valentine  - A-line  - CTx4 (left pleural, meds x2, pericardial)  - PIVx1       Disposition:  - CV ICU.    Nayely Stiles MD  General Surgery, PGY-3  Pg [phone]      ====================================    SUBJECTIVE:   No acute events overnight. Following commands when off sedation. Fevers overnight. Decreasing pressor requirements.     OBJECTIVE:   1. VITAL SIGNS:   Temp:  [99.9  F (37.7  C)-101.8  F (38.8  C)] 101.7  F (38.7  C)  Heart Rate:  [] 107  Resp:  [12-14] 12  MAP:  [55 mmHg-75 mmHg] 74 mmHg  Arterial Line BP: (58-92)/(51-69) 84/64  FiO2 (%):  [40 %] 40 %  SpO2:  [95 %-100 %] 98 %  Ventilation Mode: CMV/AC  (Continuous Mandatory Ventilation/ Assist Control)  FiO2 (%): 40 %  Rate Set (breaths/minute): 14 breaths/min  Tidal Volume Set (mL): 480 mL  PEEP (cm H2O): 5 cmH2O  Oxygen Concentration (%): 40 %  Resp: 12 (PS 7/5)    2. INTAKE/ OUTPUT:   I/O last 3 completed shifts:  In: 2986.16 [I.V.:2276.16; NG/GT:210]  Out: 2145 [Urine:1400; Emesis/NG output:240; Chest Tube:505]    3. PHYSICAL EXAMINATION:   General: sedated adult male lying in bed  Neuro: Sedated, arousable to voice and follows commands   Resp: Intubated and mechanically ventilated  CV: RRR  Abdomen: Soft, Non-distended, Non-tender  Incisions: c/d/i  Extremities: warm and well perfused    4. INVESTIGATIONS:   Arterial Blood Gases     Recent Labs  Lab 12/07/18  1020 12/07/18  0328 12/06/18  2212 12/06/18  0336   PH 7.38 7.43 7.44 7.47*   PCO2 44 38 35 35   PO2 102 92 83 95   HCO3 26 25 24 26     Complete Blood Count     Recent Labs  Lab 12/07/18  0403 12/06/18  2218 12/06/18  1854 12/06/18  1658   WBC 9.1 9.1 8.4 8.3   HGB 9.0* 9.2* 8.5* 7.8*   * 102* 86* 80*     Basic Metabolic Panel    Recent Labs  Lab 12/07/18  0403 12/06/18  2218 12/06/18  1658 12/06/18  1021    144 144 144   POTASSIUM 4.1 4.2 4.1 3.4   CHLORIDE 110* 111* 112* 113*   CO2 26 26 25  22   BUN 14 13 13 11   CR 0.94 0.88 0.88 0.78   * 100* 105* 99     Liver Function Tests    Recent Labs  Lab 12/07/18  0403 12/06/18  1148 12/06/18  0336 12/05/18  1455  12/04/18  0800 12/03/18  1850 11/30/18  2349   *  --   --   --   --  14 12 15   ALT 18  --   --   --   --  13 14 15   ALKPHOS 72  --   --   --   --  97 99 90   BILITOTAL 5.8*  --   --   --   --  2.0* 2.2* 2.9*   ALBUMIN 3.1*  --   --   --   --  3.2* 3.2* 3.3*   INR 2.05* 2.09* 1.81* 1.67*  < >  --   --   --    < > = values in this interval not displayed.  Pancreatic Enzymes    Recent Labs  Lab 11/30/18  1130   LIPASE 51*   AMYLASE 20*     Coagulation Profile    Recent Labs  Lab 12/07/18  0403 12/06/18  1148 12/06/18  0336 12/05/18  1455 12/05/18  1220   INR 2.05* 2.09* 1.81* 1.67* 2.03*   PTT 60*  --  38* 60* 35         5. RADIOLOGY:   Recent Results (from the past 24 hour(s))   XR Chest Port 1 View    Narrative    Vascular exam Exam: XR CHEST PORT 1 VW, 12/7/2018 1:00 AM    Indication: Interval change;     Comparison: X-ray 12/6/2018    Findings:   Frontal x-ray of the chest. Left ventricular assist device is  partially visualized. [place]-Soy catheter with tip projecting over the  main pulmonary artery. Endotracheal tube tip projects over the  mid/distal thoracic trachea. Enteric tube is coursing below the  diaphragm. Unchanged mediastinal drain. Right upper extremity PICC tip  projecting the cavoatrial junction. Left pectoral cardiac device with  intracardiac leads. Persistent streaky bibasilar opacities. Unchanged  mediastinal silhouette. No pneumothorax or large pleural effusion.      Impression    Impression:   1. Persistent interstitial pulmonary opacities representing a  combination of pulmonary edema and atelectasis.  2. Stable support devices.    I have personally reviewed the examination and initial interpretation  and I agree with the findings.    OTILIA STARR MD       =========================================

## 2018-12-08 ENCOUNTER — APPOINTMENT (OUTPATIENT)
Dept: CT IMAGING | Facility: CLINIC | Age: 63
DRG: 001 | End: 2018-12-08
Attending: INTERNAL MEDICINE
Payer: COMMERCIAL

## 2018-12-08 ENCOUNTER — APPOINTMENT (OUTPATIENT)
Dept: PHYSICAL THERAPY | Facility: CLINIC | Age: 63
DRG: 001 | End: 2018-12-08
Attending: THORACIC SURGERY (CARDIOTHORACIC VASCULAR SURGERY)
Payer: COMMERCIAL

## 2018-12-08 ENCOUNTER — APPOINTMENT (OUTPATIENT)
Dept: GENERAL RADIOLOGY | Facility: CLINIC | Age: 63
DRG: 001 | End: 2018-12-08
Attending: INTERNAL MEDICINE
Payer: COMMERCIAL

## 2018-12-08 LAB
ANION GAP SERPL CALCULATED.3IONS-SCNC: 11 MMOL/L (ref 3–14)
ANION GAP SERPL CALCULATED.3IONS-SCNC: 7 MMOL/L (ref 3–14)
ANION GAP SERPL CALCULATED.3IONS-SCNC: 7 MMOL/L (ref 3–14)
BASE EXCESS BLDA CALC-SCNC: 1 MMOL/L
BASE EXCESS BLDV CALC-SCNC: 0 MMOL/L
BASE EXCESS BLDV CALC-SCNC: 1.3 MMOL/L
BASE EXCESS BLDV CALC-SCNC: 1.5 MMOL/L
BASE EXCESS BLDV CALC-SCNC: 3.2 MMOL/L
BUN SERPL-MCNC: 18 MG/DL (ref 7–30)
BUN SERPL-MCNC: 21 MG/DL (ref 7–30)
BUN SERPL-MCNC: 22 MG/DL (ref 7–30)
CALCIUM SERPL-MCNC: 8.6 MG/DL (ref 8.5–10.1)
CALCIUM SERPL-MCNC: 8.7 MG/DL (ref 8.5–10.1)
CALCIUM SERPL-MCNC: 9 MG/DL (ref 8.5–10.1)
CHLORIDE SERPL-SCNC: 111 MMOL/L (ref 94–109)
CHLORIDE SERPL-SCNC: 112 MMOL/L (ref 94–109)
CHLORIDE SERPL-SCNC: 112 MMOL/L (ref 94–109)
CO2 SERPL-SCNC: 23 MMOL/L (ref 20–32)
CO2 SERPL-SCNC: 26 MMOL/L (ref 20–32)
CO2 SERPL-SCNC: 28 MMOL/L (ref 20–32)
CREAT SERPL-MCNC: 0.78 MG/DL (ref 0.66–1.25)
CREAT SERPL-MCNC: 0.8 MG/DL (ref 0.66–1.25)
CREAT SERPL-MCNC: 0.81 MG/DL (ref 0.66–1.25)
ERYTHROCYTE [DISTWIDTH] IN BLOOD BY AUTOMATED COUNT: 18.7 % (ref 10–15)
ERYTHROCYTE [DISTWIDTH] IN BLOOD BY AUTOMATED COUNT: 18.9 % (ref 10–15)
ERYTHROCYTE [DISTWIDTH] IN BLOOD BY AUTOMATED COUNT: 19.1 % (ref 10–15)
GFR SERPL CREATININE-BSD FRML MDRD: >90 ML/MIN/1.7M2
GLUCOSE BLDC GLUCOMTR-MCNC: 118 MG/DL (ref 70–99)
GLUCOSE BLDC GLUCOMTR-MCNC: 88 MG/DL (ref 70–99)
GLUCOSE BLDC GLUCOMTR-MCNC: 89 MG/DL (ref 70–99)
GLUCOSE BLDC GLUCOMTR-MCNC: 96 MG/DL (ref 70–99)
GLUCOSE SERPL-MCNC: 128 MG/DL (ref 70–99)
GLUCOSE SERPL-MCNC: 91 MG/DL (ref 70–99)
GLUCOSE SERPL-MCNC: 99 MG/DL (ref 70–99)
HCO3 BLD-SCNC: 26 MMOL/L (ref 21–28)
HCO3 BLDV-SCNC: 26 MMOL/L (ref 21–28)
HCO3 BLDV-SCNC: 27 MMOL/L (ref 21–28)
HCO3 BLDV-SCNC: 27 MMOL/L (ref 21–28)
HCO3 BLDV-SCNC: 29 MMOL/L (ref 21–28)
HCT VFR BLD AUTO: 27.8 % (ref 40–53)
HCT VFR BLD AUTO: 29.3 % (ref 40–53)
HCT VFR BLD AUTO: 30.1 % (ref 40–53)
HGB BLD-MCNC: 8.4 G/DL (ref 13.3–17.7)
HGB BLD-MCNC: 8.9 G/DL (ref 13.3–17.7)
HGB BLD-MCNC: 9.1 G/DL (ref 13.3–17.7)
LMWH PPP CHRO-ACNC: 0.12 IU/ML
LMWH PPP CHRO-ACNC: 0.14 IU/ML
LMWH PPP CHRO-ACNC: 0.18 IU/ML
MAGNESIUM SERPL-MCNC: 2.4 MG/DL (ref 1.6–2.3)
MCH RBC QN AUTO: 29.8 PG (ref 26.5–33)
MCH RBC QN AUTO: 30 PG (ref 26.5–33)
MCH RBC QN AUTO: 30.2 PG (ref 26.5–33)
MCHC RBC AUTO-ENTMCNC: 30.2 G/DL (ref 31.5–36.5)
MCHC RBC AUTO-ENTMCNC: 30.2 G/DL (ref 31.5–36.5)
MCHC RBC AUTO-ENTMCNC: 30.4 G/DL (ref 31.5–36.5)
MCV RBC AUTO: 99 FL (ref 78–100)
O2/TOTAL GAS SETTING VFR VENT: ABNORMAL %
O2/TOTAL GAS SETTING VFR VENT: NORMAL %
OXYHGB MFR BLD: 94 % (ref 92–100)
OXYHGB MFR BLDV: 46 %
OXYHGB MFR BLDV: 49 %
OXYHGB MFR BLDV: 51 %
OXYHGB MFR BLDV: 52 %
PCO2 BLD: 42 MM HG (ref 35–45)
PCO2 BLDV: 46 MM HG (ref 40–50)
PCO2 BLDV: 48 MM HG (ref 40–50)
PCO2 BLDV: 48 MM HG (ref 40–50)
PCO2 BLDV: 50 MM HG (ref 40–50)
PH BLD: 7.4 PH (ref 7.35–7.45)
PH BLDV: 7.36 PH (ref 7.32–7.43)
PH BLDV: 7.37 PH (ref 7.32–7.43)
PHOSPHATE SERPL-MCNC: 3.2 MG/DL (ref 2.5–4.5)
PLATELET # BLD AUTO: 76 10E9/L (ref 150–450)
PLATELET # BLD AUTO: 83 10E9/L (ref 150–450)
PLATELET # BLD AUTO: 90 10E9/L (ref 150–450)
PO2 BLD: 84 MM HG (ref 80–105)
PO2 BLDV: 27 MM HG (ref 25–47)
PO2 BLDV: 29 MM HG (ref 25–47)
PO2 BLDV: 29 MM HG (ref 25–47)
PO2 BLDV: 30 MM HG (ref 25–47)
POTASSIUM SERPL-SCNC: 3.5 MMOL/L (ref 3.4–5.3)
POTASSIUM SERPL-SCNC: 4.1 MMOL/L (ref 3.4–5.3)
POTASSIUM SERPL-SCNC: 4.1 MMOL/L (ref 3.4–5.3)
RADIOLOGIST FLAGS: ABNORMAL
RBC # BLD AUTO: 2.8 10E12/L (ref 4.4–5.9)
RBC # BLD AUTO: 2.95 10E12/L (ref 4.4–5.9)
RBC # BLD AUTO: 3.05 10E12/L (ref 4.4–5.9)
SODIUM SERPL-SCNC: 145 MMOL/L (ref 133–144)
SODIUM SERPL-SCNC: 146 MMOL/L (ref 133–144)
SODIUM SERPL-SCNC: 146 MMOL/L (ref 133–144)
WBC # BLD AUTO: 8.1 10E9/L (ref 4–11)
WBC # BLD AUTO: 8.8 10E9/L (ref 4–11)
WBC # BLD AUTO: 9.6 10E9/L (ref 4–11)

## 2018-12-08 PROCEDURE — 71250 CT THORAX DX C-: CPT

## 2018-12-08 PROCEDURE — 40000275 ZZH STATISTIC RCP TIME EA 10 MIN

## 2018-12-08 PROCEDURE — C9113 INJ PANTOPRAZOLE SODIUM, VIA: HCPCS | Performed by: THORACIC SURGERY (CARDIOTHORACIC VASCULAR SURGERY)

## 2018-12-08 PROCEDURE — 25000132 ZZH RX MED GY IP 250 OP 250 PS 637: Performed by: THORACIC SURGERY (CARDIOTHORACIC VASCULAR SURGERY)

## 2018-12-08 PROCEDURE — 71045 X-RAY EXAM CHEST 1 VIEW: CPT

## 2018-12-08 PROCEDURE — 25000132 ZZH RX MED GY IP 250 OP 250 PS 637: Performed by: NURSE PRACTITIONER

## 2018-12-08 PROCEDURE — 84100 ASSAY OF PHOSPHORUS: CPT | Performed by: SURGERY

## 2018-12-08 PROCEDURE — 20000004 ZZH R&B ICU UMMC

## 2018-12-08 PROCEDURE — 25000128 H RX IP 250 OP 636: Performed by: INTERNAL MEDICINE

## 2018-12-08 PROCEDURE — 85520 HEPARIN ASSAY: CPT | Performed by: NURSE PRACTITIONER

## 2018-12-08 PROCEDURE — 97530 THERAPEUTIC ACTIVITIES: CPT | Mod: GP | Performed by: PHYSICAL THERAPIST

## 2018-12-08 PROCEDURE — 80048 BASIC METABOLIC PNL TOTAL CA: CPT | Performed by: THORACIC SURGERY (CARDIOTHORACIC VASCULAR SURGERY)

## 2018-12-08 PROCEDURE — 25000128 H RX IP 250 OP 636: Performed by: SURGERY

## 2018-12-08 PROCEDURE — 82805 BLOOD GASES W/O2 SATURATION: CPT | Performed by: THORACIC SURGERY (CARDIOTHORACIC VASCULAR SURGERY)

## 2018-12-08 PROCEDURE — 99233 SBSQ HOSP IP/OBS HIGH 50: CPT | Mod: GC | Performed by: INTERNAL MEDICINE

## 2018-12-08 PROCEDURE — 83735 ASSAY OF MAGNESIUM: CPT | Performed by: SURGERY

## 2018-12-08 PROCEDURE — 85520 HEPARIN ASSAY: CPT | Performed by: THORACIC SURGERY (CARDIOTHORACIC VASCULAR SURGERY)

## 2018-12-08 PROCEDURE — 80048 BASIC METABOLIC PNL TOTAL CA: CPT | Performed by: SURGERY

## 2018-12-08 PROCEDURE — 25000132 ZZH RX MED GY IP 250 OP 250 PS 637: Performed by: SURGERY

## 2018-12-08 PROCEDURE — 40000193 ZZH STATISTIC PT WARD VISIT: Performed by: PHYSICAL THERAPIST

## 2018-12-08 PROCEDURE — 40000196 ZZH STATISTIC RAPCV CVP MONITORING

## 2018-12-08 PROCEDURE — 40000048 ZZH STATISTIC DAILY SWAN MONITORING

## 2018-12-08 PROCEDURE — 97162 PT EVAL MOD COMPLEX 30 MIN: CPT | Mod: GP | Performed by: PHYSICAL THERAPIST

## 2018-12-08 PROCEDURE — 85027 COMPLETE CBC AUTOMATED: CPT | Performed by: THORACIC SURGERY (CARDIOTHORACIC VASCULAR SURGERY)

## 2018-12-08 PROCEDURE — 25000128 H RX IP 250 OP 636: Performed by: THORACIC SURGERY (CARDIOTHORACIC VASCULAR SURGERY)

## 2018-12-08 PROCEDURE — 00000146 ZZHCL STATISTIC GLUCOSE BY METER IP

## 2018-12-08 PROCEDURE — 85027 COMPLETE CBC AUTOMATED: CPT | Performed by: SURGERY

## 2018-12-08 PROCEDURE — 85520 HEPARIN ASSAY: CPT | Performed by: SURGERY

## 2018-12-08 PROCEDURE — 40000014 ZZH STATISTIC ARTERIAL MONITORING DAILY

## 2018-12-08 RX ORDER — PANTOPRAZOLE SODIUM 40 MG/1
40 TABLET, DELAYED RELEASE ORAL
Status: DISCONTINUED | OUTPATIENT
Start: 2018-12-09 | End: 2018-12-18 | Stop reason: HOSPADM

## 2018-12-08 RX ORDER — PROPOFOL 10 MG/ML
INJECTION, EMULSION INTRAVENOUS
Status: DISCONTINUED
Start: 2018-12-08 | End: 2018-12-08

## 2018-12-08 RX ORDER — LIDOCAINE 4 G/G
2 PATCH TOPICAL
Status: DISCONTINUED | OUTPATIENT
Start: 2018-12-08 | End: 2018-12-18 | Stop reason: HOSPADM

## 2018-12-08 RX ORDER — FENTANYL CITRATE 50 UG/ML
INJECTION, SOLUTION INTRAMUSCULAR; INTRAVENOUS
Status: DISCONTINUED
Start: 2018-12-08 | End: 2018-12-08

## 2018-12-08 RX ORDER — FUROSEMIDE 10 MG/ML
40 INJECTION INTRAMUSCULAR; INTRAVENOUS 2 TIMES DAILY
Status: COMPLETED | OUTPATIENT
Start: 2018-12-08 | End: 2018-12-08

## 2018-12-08 RX ORDER — FUROSEMIDE 10 MG/ML
40 INJECTION INTRAMUSCULAR; INTRAVENOUS ONCE
Status: COMPLETED | OUTPATIENT
Start: 2018-12-08 | End: 2018-12-08

## 2018-12-08 RX ADMIN — MUPIROCIN 1 G: 20 OINTMENT TOPICAL at 20:34

## 2018-12-08 RX ADMIN — PANTOPRAZOLE SODIUM 40 MG: 40 INJECTION, POWDER, FOR SOLUTION INTRAVENOUS at 07:44

## 2018-12-08 RX ADMIN — POTASSIUM CHLORIDE 20 MEQ: 29.8 INJECTION, SOLUTION INTRAVENOUS at 17:59

## 2018-12-08 RX ADMIN — SENNOSIDES AND DOCUSATE SODIUM 2 TABLET: 8.6; 5 TABLET ORAL at 20:33

## 2018-12-08 RX ADMIN — FUROSEMIDE 40 MG: 10 INJECTION, SOLUTION INTRAVENOUS at 18:26

## 2018-12-08 RX ADMIN — ATORVASTATIN CALCIUM 10 MG: 10 TABLET, FILM COATED ORAL at 07:43

## 2018-12-08 RX ADMIN — TRAZODONE HYDROCHLORIDE 100 MG: 100 TABLET ORAL at 00:53

## 2018-12-08 RX ADMIN — MUPIROCIN 1 G: 20 OINTMENT TOPICAL at 08:12

## 2018-12-08 RX ADMIN — LIDOCAINE 2 PATCH: 560 PATCH PERCUTANEOUS; TOPICAL; TRANSDERMAL at 20:32

## 2018-12-08 RX ADMIN — POLYETHYLENE GLYCOL 3350 17 G: 17 POWDER, FOR SOLUTION ORAL at 20:32

## 2018-12-08 RX ADMIN — ASPIRIN 81 MG CHEWABLE TABLET 81 MG: 81 TABLET CHEWABLE at 07:42

## 2018-12-08 RX ADMIN — SENNOSIDES AND DOCUSATE SODIUM 2 TABLET: 8.6; 5 TABLET ORAL at 07:42

## 2018-12-08 RX ADMIN — THIAMINE HCL TAB 100 MG 100 MG: 100 TAB at 07:42

## 2018-12-08 RX ADMIN — FUROSEMIDE 40 MG: 10 INJECTION, SOLUTION INTRAVENOUS at 09:00

## 2018-12-08 RX ADMIN — POLYETHYLENE GLYCOL 3350 17 G: 17 POWDER, FOR SOLUTION ORAL at 07:44

## 2018-12-08 RX ADMIN — FUROSEMIDE 40 MG: 10 INJECTION, SOLUTION INTRAVENOUS at 21:58

## 2018-12-08 ASSESSMENT — ACTIVITIES OF DAILY LIVING (ADL)
ADLS_ACUITY_SCORE: 11

## 2018-12-08 NOTE — PROGRESS NOTES
CV ICU PROGRESS NOTE  December 8, 2018    CO-MORBIDITIES:   Nocturnal oxygen desaturation  (primary encounter diagnosis)  Acute on chronic systolic heart failure (H)    ASSESSMENT: Danielito Chu is a 63 year-old male with a PMHx s/f SHIRLEY, HTN, CHF (LVEF 15%), NIDDM2 who underwent LVAD placement on 12/5/18 with Dr. Silva. Extubated 12/7.    TODAY'S PROGRESS:   - CT chest- right effusion and atelectasis  - Pulmonary toilet  - Low intensity heparin. Coumadin once INR stabilizes.  - remove arterial line  - lasix 40, goal neg 1L    PLAN:   Neuro/ pain/ sedation:  - Monitor neurological status. Notify the MD for any acute changes in exam.  - Pain: Fentanyl gtt, Oxy/Dilaudid/tylenol prn, Lidoderm.     Pulmonary care:   #SHIRLEY  - CPAP overnight as tolerated   #pHTN  - Aggressive pulmonary toilet given ateletasis     Cardiovascular:    #h/o HTN  #CHF s/p Heartmate III LVAD placement 12/5  - Monitor hemodynamic status.   - ASA 81mg  - Atorvastatin 10mg     GI care:   - Regular diet  - Bowel regimen: Miralax, Senna-Colace       Fluids/ Electrolytes/ Nutrition:   - ICU electrolyte replacement protocol  - Nutrition consulted. Appreciate recs     Renal/ Fluid Balance:    - Will monitor intake and output.  - Net neg 1L       Endocrine:  #NIDDM2    - SSI       ID/ Antibiotics:  - Completing a course of periop anti-microbials: Fluconazole, Levaquin, Rifampin, Vancomycin (48h post-op)  - UA with reflex Ucx, Bcx, follow WBC. Afebrile overnight.       Heme:     - hgb stable       Prophylaxis:    - Mechanical prophylaxis for DVT.  - Low intensity heparin  - Hold on starting coumadin today given INR of 2       MSK:    - PT and OT consulted. Appreciate recs.       Lines/ tubes/ drains:  - RIJ MAC with [place]  - CTx4 (left pleural, meds x2, pericardial)- output too high for removal  - PIVx1       Disposition:  - CV ICU.    Nayely Stiles MD  General Surgery, PGY-3  Pg [phone]      ====================================    SUBJECTIVE:    No acute events overnight. Off of epi this morning. Tolerating regular diet. No BM     OBJECTIVE:   1. VITAL SIGNS:   Temp:  [97.7  F (36.5  C)-99.3  F (37.4  C)] 97.8  F (36.6  C)  Heart Rate:  [] 96  Resp:  [14-22] 14  BP: (86)/(72) 86/72  MAP:  [69 mmHg-99 mmHg] 90 mmHg  Arterial Line BP: ()/(55-84) 99/79  SpO2:  [87 %-100 %] 87 %  Ventilation Mode: CPAP/PS  (Continuous positive airway pressure with Pressure Support)  FiO2 (%): 40 %  Rate Set (breaths/minute): 14 breaths/min  Tidal Volume Set (mL): 480 mL  PEEP (cm H2O): 5 cmH2O  Pressure Support (cm H2O): 7 cmH2O  Oxygen Concentration (%): 40 %  Resp: 14    2. INTAKE/ OUTPUT:   I/O last 3 completed shifts:  In: 1732.9 [P.O.:220; I.V.:1422.9; NG/GT:90]  Out: 2255 [Urine:1375; Chest Tube:880]    3. PHYSICAL EXAMINATION:   General: sitting up in chair  Neuro: CASTELLANO, following commands  Resp: Non-labored breathign  CV: RRR  Abdomen: Soft, Non-distended, Non-tender  Incisions: c/d/i  Extremities: warm and well perfused    4. INVESTIGATIONS:   Arterial Blood Gases     Recent Labs  Lab 12/08/18  0953 12/07/18  1617 12/07/18  1020 12/07/18  0328   PH 7.40 7.38 7.38 7.43   PCO2 42 40 44 38   PO2 84 128* 102 92   HCO3 26 24 26 25     Complete Blood Count     Recent Labs  Lab 12/08/18  0953 12/08/18  0400 12/07/18  2101 12/07/18  1616   WBC 8.8 9.6 11.6* 10.9   HGB 8.9* 9.1* 9.7* 9.7*   PLT 83* 90* 107* 106*     Basic Metabolic Panel    Recent Labs  Lab 12/08/18  0953 12/08/18  0400 12/07/18  2101 12/07/18  1616   * 145* 144 144   POTASSIUM 4.1 4.1 4.2 3.9   CHLORIDE 112* 112* 110* 110*   CO2 26 23 26 26   BUN 21 18 17 16   CR 0.80 0.78 0.81 0.88   GLC 91 99 126* 106*     Liver Function Tests    Recent Labs  Lab 12/07/18  0403 12/06/18  1148 12/06/18  0336 12/05/18  1455  12/04/18  0800 12/03/18  1850   *  --   --   --   --  14 12   ALT 18  --   --   --   --  13 14   ALKPHOS 72  --   --   --   --  97 99   BILITOTAL 5.8*  --   --   --   --  2.0*  2.2*   ALBUMIN 3.1*  --   --   --   --  3.2* 3.2*   INR 2.05* 2.09* 1.81* 1.67*  < >  --   --    < > = values in this interval not displayed.  Pancreatic Enzymes  No lab results found in last 7 days.  Coagulation Profile    Recent Labs  Lab 12/07/18  0403 12/06/18  1148 12/06/18  0336 12/05/18  1455 12/05/18  1220   INR 2.05* 2.09* 1.81* 1.67* 2.03*   PTT 60*  --  38* 60* 35         5. RADIOLOGY:   Recent Results (from the past 24 hour(s))   XR Chest Port 1 View    Narrative    Exam: XR CHEST PORT 1 VW, 12/8/2018 2:56 AM    Indication: intubated, LVAD;     Comparison: Chest x-ray 12/7/2018    Findings:   Frontal chest x-ray. Left ventricular assist device is partially  visualized. Right IJ [place]-Soy catheter with tip projecting over the  right pulmonary artery. Right upper extremity PICC with tip projecting  over the high right atrium. Left pectoral cardiac device with  intracardiac lead. Unchanged left chest tube and mediastinal drains.  Intact appearing median sternal wires. No pneumothorax. Interval  increase in pulmonary opacities especially involving the right lung.  Interval removal of the endotracheal tube. Interval removal of enteric  tube.      Impression    Impression:   1. Interval increase in pulmonary opacities especially involving the  right lung, likely representing pulmonary edema versus infection.  2. Support devices, as described above.   3. Bilateral pleural effusions, right greater than left and  increasing.    I have personally reviewed the examination and initial interpretation  and I agree with the findings.    OTILIA STARR MD   CT Chest w/o Contrast   Result Value    Radiologist flags Trace right pneumothorax,  moderate to large right (Urgent)    Narrative    EXAMINATION: CT CHEST W/O CONTRAST, 12/8/2018 9:43 AM    CLINICAL HISTORY: s/p LVAD, right sided pulmonary opacity;     COMPARISON: Radiograph same day, CT 10/23/2018.    TECHNIQUE: CT imaging obtained through the chest without  contrast.  Coronal and axial MIP reformatted images obtained.     CONTRAST:  none.    FINDINGS:    Lines and tubes: 2 mediastinal drains, left basilar chest tube, drain  adjacent to the Left ventricular assist device. Left subclavian  approach cardiac implant device. Right arm PICC line. Left ventricular  assist device.    Mediastinum: No thyroid nodules. Central tracheobronchial tree is  patent. Heart size is enlarged. Minimal soft tissue thickening in the  mediastinum with associated minimal air, secondary to recent surgery.  Normal thoracic vasculature. Slightly prominent mediastinal lymph  nodes, likely reactive.    Lungs: Areas of interlobular septal thickening, few groundglass  opacities in the left upper lobe, with few groundglass opacities in  the right lung perihilar region. Decreased volume of the left lower  lobe with multifocal consolidation. Small left and moderate right  pleural effusions. Trace bilateral pneumothoraces.    Bones and soft tissues: No suspicious bone findings. Right shoulder  degenerative changes. Well apposed poststernotomy.    Upper abdomen: Limited. Pneumoperitoneum, likely from recent surgery.      Impression    IMPRESSION:   1. Moderate to large right pleural effusion, trace right pneumothorax.  2. Mild left pleural effusion, trace left pneumothorax. Left basilar  chest tube in place.  3. Small pneumoperitoneum, presumed postsurgical.  4. Pulmonary findings, likely secondary to mild pulmonary edema. Left  lower sublobar atelectasis.   5. Scattered small quantity of air in the mediastinum, presumably  postoperative from the LVAD placement.    [Urgent Result: Trace right pneumothorax,  moderate to large right  pleural effusion, presumed postsurgical pneumoperitoneum]    Finding was identified on 12/8/2018 11:58 AM.     Dr. Stiles was contacted by Dr. Luis Resendez at 12/8/2018 1:39 PM and  verbalized understanding of the urgent finding.     I have personally reviewed the examination  and initial interpretation  and I agree with the findings.    OTILIA STARR MD       =========================================

## 2018-12-08 NOTE — PLAN OF CARE
Problem: Patient Care Overview  Goal: Plan of Care/Patient Progress Review  1. Pt will be hemodynamically stable.  2. Pt and family will verbalize understanding of plan of care.  3. Pt will remain free of falls  4. Pain will be under control or minimal   OT 4E: Cancel - pt transferring rooms upon attempt, will reschedule.

## 2018-12-08 NOTE — PLAN OF CARE
Problem: Patient Care Overview  Goal: Plan of Care/Patient Progress Review  1. Pt will be hemodynamically stable.  2. Pt and family will verbalize understanding of plan of care.  3. Pt will remain free of falls  4. Pain will be under control or minimal   Propofol drip off early this am and pt tolerated PS for few hrs and pt extubated w/o problem. Pt on 2 liters NC O2 with sats 98%, pt denies any SOB. Vasopressin titrated off and Fentanyl drip off, pt denies any c/o pain. K+ level 1.9, replaced per protocol and to be rechecked tonight. Pt changed to Heparin gtt per protocol see MAR. Pt assisted OOB to chair w/ MAX SBA w/ OT and RN standing and small steps and pivoting. Pt demonstrated correct use of IS 10 times. Pt TMAX 101.8 today, bl cx's done,U/A sent and pt unable to produce sputum at this time. Tylenol given, fan on, temp 99.3. Pt passed nurse bedside swallow and has strong swallow reflex, no coughing. Pt w/ one medial CT that has thick clots in it, constantly being stripped, Dr. James here to observe and states to keep close eye on it and continue to strip it. Pt tolerating EPI wean at this time.

## 2018-12-08 NOTE — PROGRESS NOTES
ADVANCED HEART FAILURE PROGRESS NOTE    SUBJECTIVE:  No acute overnight events. Off pressors this morning. Fatigued but overall doing well this morning with no particular complaints.    ROS otherwise negative.    OBJECTIVE:  Vital signs:  Temp: 97.9  F (36.6  C) Temp  Min: 97.7  F (36.5  C)  Max: 101.8  F (38.8  C)  Heart Rate: 99 Heart Rate  Min: 98  Max: 135  BP: (!) 86/72 Systolic (24hrs), Av , Min:86 , Max:86   Diastolic (24hrs), Av, Min:72, Max:72    Resp: 20 Resp  Min: 12  Max: 22  SpO2: 99 % SpO2  Min: 96 %  Max: 100 %      HM3: 5200 RPM, 4.1 LPM, PI 3.3-3.6    [place]: RA 13, PA 32/18, PCW 18, SVO2 52%, CO/CI 4.6/2.2      Intake/Output Summary (Last 24 hours) at 18 0700  Last data filed at 18 0629   Gross per 24 hour   Intake           1677.6 ml   Output             2255 ml   Net           -577.4 ml       Vitals:    18 0511 18 0200 18 0200   Weight: 81.9 kg (180 lb 9.6 oz) 86.6 kg (190 lb 14.7 oz) 87.6 kg (193 lb 2 oz)       Physical Exam:  General: lying in bed, appear tired but comfortable  Resp: scattered rales, breathing comfortably  CV: regular, tachycardic, VAD hum, no JVD, incisions clean  Abdomen: Soft, Non-distended, Non-tender  Extremities: warm and well perfused, no edema       Medications:    EPINEPHrine IV infusion ADULT Stopped (18 0450)     HEParin 1,100 Units/hr (18 0600)     BETA BLOCKER NOT PRESCRIBED         Current Facility-Administered Medications   Medication Dose Route Frequency     aspirin  81 mg Oral Daily     atorvastatin  10 mg Oral Daily     fentaNYL (PF)         insulin aspart  1-7 Units Subcutaneous TID AC     insulin aspart  1-5 Units Subcutaneous At Bedtime     mupirocin  1 g Both Nostrils BID     pantoprazole (PROTONIX) IV  40 mg Intravenous Daily     polyethylene glycol  17 g Oral BID     propofol         senna-docusate  2 tablet Oral BID     sodium chloride (PF)  3 mL Intracatheter Q8H     vitamin B1  100 mg Oral Daily          Labs:  CMP  Recent Labs  Lab 12/08/18  0400 12/07/18  2101  12/07/18  0403  12/04/18  0800   * 144  < > 144  < > 137   POTASSIUM 4.1 4.2  < > 4.1  < > 3.2*   CHLORIDE 112* 110*  < > 110*  < > 101   CO2 23 26  < > 26  < > 25   BUN 18 17  < > 14  < > 24   CR 0.78 0.81  < > 0.94  < > 1.17   ASHLEE 9.0 8.7  < > 8.9  < > 8.4*   MAG 2.4*  --   --  2.7*  < > 2.1   PHOS 3.2  --   --  3.3  < >  --    GLC 99 126*  < > 118*  < > 92   ALKPHOS  --   --   --  72  --  97   BILITOTAL  --   --   --  5.8*  --  2.0*   AST  --   --   --  117*  --  14   ALT  --   --   --  18  --  13   < > = values in this interval not displayed.  BLOOD GAS  Recent Labs  Lab 12/07/18  1617 12/07/18  1020   PH 7.38 7.38   PCO2 40 44   PO2 128* 102     CBC  Recent Labs  Lab 12/08/18  0400 12/07/18  2101   WBC 9.6 11.6*   HGB 9.1* 9.7*   HCT 30.1* 32.0*   PLT 90* 107*     COAG  Recent Labs  Lab 12/07/18  0403 12/06/18  1148 12/06/18  0336   INR 2.05* 2.09* 1.81*   PTT 60*  --  38*         ASSESSMENT/PLAN:  Danielito Chu is a 63 year-old male with a PMHx s/f SHIRLEY, HTN, CHF (LVEF 15%), NIDDM2 who underwent HeartMate III LVAD placement on 12/5/18 with Dr. Silva.      #h/o HTN  #CHF s/p Heartmate III LVAD placement 12/5  - now off pressors  - RV had mild dysfunction prior to LVAD.  - extubated 12/7  - on Aspirin 81mg  - advancing to low intensity heparin drip, holding warfarin for now  - increase LVAD speed to 5300 RPM for now, will increase as tolerated  - fevers improved, cultures remain no growth     Patient seen and evaluated with the attending cardiologist Dr. Giancarlo Rockwell MD  Advanced Heart Failure Fellow  [phone]

## 2018-12-08 NOTE — PROGRESS NOTES
12/08/18 1025   Quick Adds   Type of Visit Initial PT Evaluation   Living Environment   Lives With significant other   Living Arrangements house   Home Accessibility stairs to enter home;bed and bath on same level   Number of Stairs to Enter Home 4   Stair Railings at Home outside, present on left side   Transportation Available family or friend will provide;car   Self-Care   Dominant Hand right   Usual Activity Tolerance good   Current Activity Tolerance fair   Regular Exercise no   Equipment Currently Used at Home none   Activity/Exercise/Self-Care Comment pt has access to cane and walker, IND without AD at baseline   Functional Level Prior   Ambulation 0-->independent   Transferring 0-->independent   Toileting 0-->independent   Bathing 0-->independent   Dressing 0-->independent   Eating 0-->independent   Communication 0-->understands/communicates without difficulty   Swallowing 0-->swallows foods/liquids without difficulty   Cognition 0 - no cognition issues reported   Fall history within last six months no   Which of the above functional risks had a recent onset or change? ambulation;transferring   Prior Functional Level Comment IND at baseline without AD   General Information   Onset of Illness/Injury or Date of Surgery - Date 11/28/18   Referring Physician Andrei Silva MD   Patient/Family Goals Statement to get better   Pertinent History of Current Problem (include personal factors and/or comorbidities that impact the POC) 63 year-old male with a PMHx s/f SHIRLEY, HTN, CHF (LVEF 15%), NIDDM2 who underwent LVAD placement on 12/5/18 with Dr. Silva.   Precautions/Limitations abdominal precautions;sternal precautions;fall precautions   Heart Disease Risk Factors High blood pressure;Medical history;Gender   General Observations pt supine, agreeable to session   General Info Comments activity orders: ambulate with assist   Cognitive Status Examination   Orientation orientation to person, place and time    Level of Consciousness alert;lethargic/somnolent   Follows Commands and Answers Questions 75% of the time   Personal Safety and Judgment intact   Memory impaired   Cognitive Comment pt slightly confused/out of it but able to follow commands   Pain Assessment   Patient Currently in Pain Yes, see Vital Sign flowsheet   Integumentary/Edema   Integumentary/Edema Comments sternal incision    Posture    Posture Forward head position;Protracted shoulders   Range of Motion (ROM)   ROM Quick Adds No deficits were identified   Strength   Strength Comments >3/5 per functional mobility    Bed Mobility   Bed Mobility Comments mod A   Transfer Skills   Transfer Comments min-mod A x 2 depending on surface height    Gait   Gait Comments unable to complete today due to fatigue    Balance   Balance Comments needs assist in standing today for balance    Sensory Examination   Sensory Perception no deficits were identified   Coordination   Coordination no deficits were identified   General Therapy Interventions   Planned Therapy Interventions progressive activity/exercise;home program guidelines;risk factor education;strengthening;cognition;gait training;balance training;bed mobility training   Clinical Impression   Criteria for Skilled Therapeutic Intervention yes, treatment indicated   PT Diagnosis impaired mobility    Influenced by the following impairments post surgical precautions    Functional limitations due to impairments impaired fucntional mobility    Clinical Presentation Evolving/Changing   Clinical Presentation Rationale pt with new presentation, seen post surgery    Clinical Decision Making (Complexity) Moderate complexity   Therapy Frequency` (6x per week)   Predicted Duration of Therapy Intervention (days/wks) 1 week    Anticipated Discharge Disposition Transitional Care Facility   Risk & Benefits of therapy have been explained Yes   Patient, Family & other staff in agreement with plan of care Yes   Clinical Impression  "Comments pt currently needing further rehab to return to PLOF   [facility] AM-PAC TM \"6 Clicks\"   2016, Trustees of [facility], under license to [employer].  All rights reserved.   6 Clicks Short Forms Basic Mobility Inpatient Short Form   [facility] AM-PAC  \"6 Clicks\" V.2 Basic Mobility Inpatient Short Form   1. Turning from your back to your side while in a flat bed without using bedrails? 3 - A Little   2. Moving from lying on your back to sitting on the side of a flat bed without using bedrails? 2 - A Lot   3. Moving to and from a bed to a chair (including a wheelchair)? 2 - A Lot   4. Standing up from a chair using your arms (e.g., wheelchair, or bedside chair)? 2 - A Lot   5. To walk in hospital room? 2 - A Lot   6. Climbing 3-5 steps with a railing? 1 - Total   Basic Mobility Raw Score (Score out of 24.Lower scores equate to lower levels of function) 12   Total Evaluation Time   Total Evaluation Time (Minutes) 6     "

## 2018-12-08 NOTE — PLAN OF CARE
Problem: Patient Care Overview  Goal: Plan of Care/Patient Progress Review  1. Pt will be hemodynamically stable.  2. Pt and family will verbalize understanding of plan of care.  3. Pt will remain free of falls  4. Pain will be under control or minimal   Outcome: Improving                            Alert, CASTELLANO. Afebrile. HM3 no alarms noted. Epi off@0500. CVP 13, PA 34/22, CO/CI 4.6 (2.1), (see Hemodynamics flowsheet). CT 30-50cc/hr each atrium.               50-60cc/hr UOP. Tolerated PO/ clear diet. Continue to monitor and intervene as indicated.

## 2018-12-08 NOTE — PLAN OF CARE
Problem: Patient Care Overview  Goal: Plan of Care/Patient Progress Review  1. Pt will be hemodynamically stable.  2. Pt and family will verbalize understanding of plan of care.  3. Pt will remain free of falls  4. Pain will be under control or minimal   Outcome: Improving  D: POD#3 HM3 placement   I/A: Xray this AM hazy on R side, CT scan ordered. Ct showed atelectasis, continue to monitor Ct output and encourage pulmonary hygiene. HM3 speed increased by Cards 2 from 5200 to 5300. MAPs maintained >65, CVP 13, PIs 3.5-4. Up to chair with 2 assist. Tolerating full liquids, advance as tolerated. Voiding adequately in urinal. Lasix given x1 this AM. Pt family at bedside for majority of shift, updated on pt status/POC.   P: Continue POC.

## 2018-12-08 NOTE — PROGRESS NOTES
ATTENDING ATTESTATION:   I personally examined and evaluated this patient on December 8, 2018.   I have reviewed today's vital signs, medications, labs, and imaging results. I have reviewed and edited, as necessary, the history, review of systems, physical examination, and assessment and plan. I discussed the patient with the resident and care team, and agree with the assessment and plan of care as documented in the note below.      Kleber James MD, PhD  Interventional/Critical Care Cardiology  [phone]    December 8, 2018

## 2018-12-08 NOTE — PLAN OF CARE
Problem: Patient Care Overview  Goal: Plan of Care/Patient Progress Review  1. Pt will be hemodynamically stable.  2. Pt and family will verbalize understanding of plan of care.  3. Pt will remain free of falls  4. Pain will be under control or minimal   PT 4E: PT evaluation completed.   Discharge Planner PT   Patient plan for discharge: local housing   Current status: pt needs mod A for supine to sit transfer, min A x 2 for STS from EOB, mod A x 2 for STS from lower recliner chair. Pt needs cues for precautions throughout session, pt able to tolerate standing for up to 1 minute, able to pivot to recliner chair. Pt on 2L O2 throughout session, vitals stable.   Barriers to return to prior living situation: post surgical precautions, assist for mobility, medical stability   Recommendations for discharge: rehab  Rationale for recommendations: at this time pt would benefit from continued rehab to progress pt's mobility and independence.        Entered by: Ashley Chairez 12/08/2018 1:39 PM

## 2018-12-08 NOTE — PROGRESS NOTES
CVTS PROGRESS NOTE  December 8, 2018    CO-MORBIDITIES:   Nocturnal oxygen desaturation  (primary encounter diagnosis)  Acute on chronic systolic heart failure (H)    ASSESSMENT: Danielito Chu is a 63 year-old male with a PMHx s/f SHIRLEY, HTN, CHF (LVEF 15%), NIDDM2 who underwent LVAD placement on 12/5/18 with Dr. Silva. Extubated 12/7.    TODAY'S PROGRESS:   - CT chest- right effusion and atelectasis  - Pulmonary toilet  - Low intensity heparin. Coumadin once INR stabilizes.  - remove arterial line  - lasix 40, goal neg 1L    PLAN:   Neuro/ pain/ sedation:  - Monitor neurological status. Notify the MD for any acute changes in exam.  - Pain: Fentanyl gtt, Oxy/Dilaudid/tylenol prn, Lidoderm.     Pulmonary care:   #SHIRLEY  - CPAP overnight as tolerated   #pHTN  - Aggressive pulmonary toilet given ateletasis     Cardiovascular:    #h/o HTN  #CHF s/p Heartmate III LVAD placement 12/5  - Monitor hemodynamic status.   - ASA 81mg  - Atorvastatin 10mg     GI care:   - Regular diet  - Bowel regimen: Miralax, Senna-Colace       Fluids/ Electrolytes/ Nutrition:   - ICU electrolyte replacement protocol  - Nutrition consulted. Appreciate recs     Renal/ Fluid Balance:    - Will monitor intake and output.  - Net neg 1L       Endocrine:  #NIDDM2    - SSI       ID/ Antibiotics:  - Completing a course of periop anti-microbials: Fluconazole, Levaquin, Rifampin, Vancomycin (48h post-op)  - UA with reflex Ucx, Bcx, follow WBC. Afebrile overnight.       Heme:     - hgb stable       Prophylaxis:    - Mechanical prophylaxis for DVT.  - Low intensity heparin  - Hold on starting coumadin today given INR of 2       MSK:    - PT and OT consulted. Appreciate recs.       Lines/ tubes/ drains:  - RIJ MAC with [place]  - CTx4 (left pleural, meds x2, pericardial)- output too high for removal  - PIVx1       Disposition:  - CV ICU.    Nayely Stiles MD  General Surgery, PGY-3  Pg [phone]      ====================================    SUBJECTIVE:    No acute events overnight. Off of epi this morning. Tolerating regular diet. No BM     OBJECTIVE:   1. VITAL SIGNS:   Temp:  [97.7  F (36.5  C)-99.3  F (37.4  C)] 97.8  F (36.6  C)  Heart Rate:  [] 97  Resp:  [14-22] 14  BP: (86)/(72) 86/72  MAP:  [69 mmHg-99 mmHg] 93 mmHg  Arterial Line BP: ()/(55-84) 101/80  SpO2:  [87 %-100 %] 97 %  Ventilation Mode: CPAP/PS  (Continuous positive airway pressure with Pressure Support)  FiO2 (%): 40 %  Rate Set (breaths/minute): 14 breaths/min  Tidal Volume Set (mL): 480 mL  PEEP (cm H2O): 5 cmH2O  Pressure Support (cm H2O): 7 cmH2O  Oxygen Concentration (%): 40 %  Resp: 14    2. INTAKE/ OUTPUT:   I/O last 3 completed shifts:  In: 1732.9 [P.O.:220; I.V.:1422.9; NG/GT:90]  Out: 2255 [Urine:1375; Chest Tube:880]    3. PHYSICAL EXAMINATION:   General: sitting up in chair  Neuro: CASTELLANO, following commands  Resp: Non-labored breathign  CV: RRR  Abdomen: Soft, Non-distended, Non-tender  Incisions: c/d/i  Extremities: warm and well perfused    4. INVESTIGATIONS:   Arterial Blood Gases     Recent Labs  Lab 12/08/18  0953 12/07/18  1617 12/07/18  1020 12/07/18  0328   PH 7.40 7.38 7.38 7.43   PCO2 42 40 44 38   PO2 84 128* 102 92   HCO3 26 24 26 25     Complete Blood Count     Recent Labs  Lab 12/08/18  0953 12/08/18  0400 12/07/18  2101 12/07/18  1616   WBC 8.8 9.6 11.6* 10.9   HGB 8.9* 9.1* 9.7* 9.7*   PLT 83* 90* 107* 106*     Basic Metabolic Panel    Recent Labs  Lab 12/08/18  0953 12/08/18  0400 12/07/18  2101 12/07/18  1616   * 145* 144 144   POTASSIUM 4.1 4.1 4.2 3.9   CHLORIDE 112* 112* 110* 110*   CO2 26 23 26 26   BUN 21 18 17 16   CR 0.80 0.78 0.81 0.88   GLC 91 99 126* 106*     Liver Function Tests    Recent Labs  Lab 12/07/18  0403 12/06/18  1148 12/06/18  0336 12/05/18  1455  12/04/18  0800 12/03/18  1850   *  --   --   --   --  14 12   ALT 18  --   --   --   --  13 14   ALKPHOS 72  --   --   --   --  97 99   BILITOTAL 5.8*  --   --   --   --  2.0*  2.2*   ALBUMIN 3.1*  --   --   --   --  3.2* 3.2*   INR 2.05* 2.09* 1.81* 1.67*  < >  --   --    < > = values in this interval not displayed.  Pancreatic Enzymes  No lab results found in last 7 days.  Coagulation Profile    Recent Labs  Lab 12/07/18  0403 12/06/18  1148 12/06/18  0336 12/05/18  1455 12/05/18  1220   INR 2.05* 2.09* 1.81* 1.67* 2.03*   PTT 60*  --  38* 60* 35         5. RADIOLOGY:   Recent Results (from the past 24 hour(s))   XR Chest Port 1 View    Narrative    Exam: XR CHEST PORT 1 VW, 12/8/2018 2:56 AM    Indication: intubated, LVAD;     Comparison: Chest x-ray 12/7/2018    Findings:   Frontal chest x-ray. Left ventricular assist device is partially  visualized. Right IJ [place] catheter with tip projecting over the  right pulmonary artery. Right upper extremity PICC with tip projecting  over the high right atrium. Left pectoral cardiac device with  intracardiac lead. Unchanged left chest tube and mediastinal drains.  Intact appearing median sternal wires. No pneumothorax. Interval  increase in pulmonary opacities especially involving the right lung.  Interval removal of the endotracheal tube. Interval removal of enteric  tube.      Impression    Impression:   1. Interval increase in pulmonary opacities especially involving the  right lung, likely representing pulmonary edema versus infection.  2. Support devices, as described above.   3. Bilateral pleural effusions, right greater than left and  increasing.    I have personally reviewed the examination and initial interpretation  and I agree with the findings.    OTILIA STARR MD   CT Chest w/o Contrast   Result Value    Radiologist flags Trace right pneumothorax,  moderate to large right (Urgent)    Narrative    EXAMINATION: CT CHEST W/O CONTRAST, 12/8/2018 9:43 AM    CLINICAL HISTORY: s/p LVAD, right sided pulmonary opacity;     COMPARISON: Radiograph same day, CT 10/23/2018.    TECHNIQUE: CT imaging obtained through the chest without  contrast.  Coronal and axial MIP reformatted images obtained.     CONTRAST:  none.    FINDINGS:    Lines and tubes: 2 mediastinal drains, left basilar chest tube, drain  adjacent to the Left ventricular assist device. Left subclavian  approach cardiac implant device. Right arm PICC line. Left ventricular  assist device.    Mediastinum: No thyroid nodules. Central tracheobronchial tree is  patent. Heart size is enlarged. Minimal soft tissue thickening in the  mediastinum with associated minimal air, secondary to recent surgery.  Normal thoracic vasculature. Slightly prominent mediastinal lymph  nodes, likely reactive.    Lungs: Areas of interlobular septal thickening, few groundglass  opacities in the left upper lobe, with few groundglass opacities in  the right lung perihilar region. Decreased volume of the left lower  lobe with multifocal consolidation. Small left and moderate right  pleural effusions. Trace bilateral pneumothoraces.    Bones and soft tissues: No suspicious bone findings. Right shoulder  degenerative changes. Well apposed poststernotomy.    Upper abdomen: Limited. Pneumoperitoneum, likely from recent surgery.      Impression    IMPRESSION:   1. Moderate to large right pleural effusion, trace right pneumothorax.  2. Mild left pleural effusion, trace left pneumothorax. Left basilar  chest tube in place.  3. Small pneumoperitoneum, presumed postsurgical.  4. Pulmonary findings, likely secondary to mild pulmonary edema. Left  lower sublobar atelectasis.   5. Scattered small quantity of air in the mediastinum, presumably  postoperative from the LVAD placement.    [Urgent Result: Trace right pneumothorax,  moderate to large right  pleural effusion, presumed postsurgical pneumoperitoneum]    Finding was identified on 12/8/2018 11:58 AM.     Dr. Stiles was contacted by Dr. Luis Resendez at 12/8/2018 1:39 PM and  verbalized understanding of the urgent finding.     I have personally reviewed the examination  and initial interpretation  and I agree with the findings.    OTILIA STARR MD       =========================================

## 2018-12-09 ENCOUNTER — APPOINTMENT (OUTPATIENT)
Dept: CARDIOLOGY | Facility: CLINIC | Age: 63
DRG: 001 | End: 2018-12-09
Attending: INTERNAL MEDICINE
Payer: COMMERCIAL

## 2018-12-09 ENCOUNTER — APPOINTMENT (OUTPATIENT)
Dept: PHYSICAL THERAPY | Facility: CLINIC | Age: 63
DRG: 001 | End: 2018-12-09
Attending: INTERNAL MEDICINE
Payer: COMMERCIAL

## 2018-12-09 ENCOUNTER — APPOINTMENT (OUTPATIENT)
Dept: OCCUPATIONAL THERAPY | Facility: CLINIC | Age: 63
DRG: 001 | End: 2018-12-09
Attending: INTERNAL MEDICINE
Payer: COMMERCIAL

## 2018-12-09 ENCOUNTER — APPOINTMENT (OUTPATIENT)
Dept: GENERAL RADIOLOGY | Facility: CLINIC | Age: 63
DRG: 001 | End: 2018-12-09
Attending: INTERNAL MEDICINE
Payer: COMMERCIAL

## 2018-12-09 LAB
ANION GAP SERPL CALCULATED.3IONS-SCNC: 10 MMOL/L (ref 3–14)
ANION GAP SERPL CALCULATED.3IONS-SCNC: 7 MMOL/L (ref 3–14)
BASE EXCESS BLDV CALC-SCNC: 2.1 MMOL/L
BASE EXCESS BLDV CALC-SCNC: 2.5 MMOL/L
BASE EXCESS BLDV CALC-SCNC: 2.7 MMOL/L
BASE EXCESS BLDV CALC-SCNC: 3.2 MMOL/L
BASE EXCESS BLDV CALC-SCNC: 3.7 MMOL/L
BASE EXCESS BLDV CALC-SCNC: 4.7 MMOL/L
BUN SERPL-MCNC: 27 MG/DL (ref 7–30)
BUN SERPL-MCNC: 28 MG/DL (ref 7–30)
CALCIUM SERPL-MCNC: 8 MG/DL (ref 8.5–10.1)
CALCIUM SERPL-MCNC: 8.4 MG/DL (ref 8.5–10.1)
CHLORIDE SERPL-SCNC: 106 MMOL/L (ref 94–109)
CHLORIDE SERPL-SCNC: 109 MMOL/L (ref 94–109)
CO2 SERPL-SCNC: 26 MMOL/L (ref 20–32)
CO2 SERPL-SCNC: 27 MMOL/L (ref 20–32)
CREAT SERPL-MCNC: 0.78 MG/DL (ref 0.66–1.25)
CREAT SERPL-MCNC: 0.84 MG/DL (ref 0.66–1.25)
ERYTHROCYTE [DISTWIDTH] IN BLOOD BY AUTOMATED COUNT: 18.3 % (ref 10–15)
ERYTHROCYTE [DISTWIDTH] IN BLOOD BY AUTOMATED COUNT: 18.4 % (ref 10–15)
GFR SERPL CREATININE-BSD FRML MDRD: >90 ML/MIN/1.7M2
GFR SERPL CREATININE-BSD FRML MDRD: >90 ML/MIN/1.7M2
GLUCOSE BLDC GLUCOMTR-MCNC: 116 MG/DL (ref 70–99)
GLUCOSE BLDC GLUCOMTR-MCNC: 130 MG/DL (ref 70–99)
GLUCOSE BLDC GLUCOMTR-MCNC: 136 MG/DL (ref 70–99)
GLUCOSE BLDC GLUCOMTR-MCNC: 97 MG/DL (ref 70–99)
GLUCOSE SERPL-MCNC: 150 MG/DL (ref 70–99)
GLUCOSE SERPL-MCNC: 93 MG/DL (ref 70–99)
GRAM STN SPEC: NORMAL
HCO3 BLDV-SCNC: 27 MMOL/L (ref 21–28)
HCO3 BLDV-SCNC: 28 MMOL/L (ref 21–28)
HCO3 BLDV-SCNC: 28 MMOL/L (ref 21–28)
HCO3 BLDV-SCNC: 30 MMOL/L (ref 21–28)
HCT VFR BLD AUTO: 25.8 % (ref 40–53)
HCT VFR BLD AUTO: 27.9 % (ref 40–53)
HGB BLD-MCNC: 8 G/DL (ref 13.3–17.7)
HGB BLD-MCNC: 8.6 G/DL (ref 13.3–17.7)
INR PPP: 1.41 (ref 0.86–1.14)
LMWH PPP CHRO-ACNC: 0.18 IU/ML
LMWH PPP CHRO-ACNC: 0.21 IU/ML
Lab: NORMAL
MAGNESIUM SERPL-MCNC: 2.1 MG/DL (ref 1.6–2.3)
MCH RBC QN AUTO: 29.6 PG (ref 26.5–33)
MCH RBC QN AUTO: 30.1 PG (ref 26.5–33)
MCHC RBC AUTO-ENTMCNC: 30.8 G/DL (ref 31.5–36.5)
MCHC RBC AUTO-ENTMCNC: 31 G/DL (ref 31.5–36.5)
MCV RBC AUTO: 96 FL (ref 78–100)
MCV RBC AUTO: 98 FL (ref 78–100)
O2/TOTAL GAS SETTING VFR VENT: 21 %
O2/TOTAL GAS SETTING VFR VENT: ABNORMAL %
OXYHGB MFR BLDV: 37 %
OXYHGB MFR BLDV: 38 %
OXYHGB MFR BLDV: 44 %
OXYHGB MFR BLDV: 48 %
OXYHGB MFR BLDV: 50 %
OXYHGB MFR BLDV: 54 %
PCO2 BLDV: 40 MM HG (ref 40–50)
PCO2 BLDV: 41 MM HG (ref 40–50)
PCO2 BLDV: 42 MM HG (ref 40–50)
PCO2 BLDV: 46 MM HG (ref 40–50)
PH BLDV: 7.42 PH (ref 7.32–7.43)
PH BLDV: 7.42 PH (ref 7.32–7.43)
PH BLDV: 7.44 PH (ref 7.32–7.43)
PH BLDV: 7.45 PH (ref 7.32–7.43)
PHOSPHATE SERPL-MCNC: 2.6 MG/DL (ref 2.5–4.5)
PLATELET # BLD AUTO: 84 10E9/L (ref 150–450)
PLATELET # BLD AUTO: 94 10E9/L (ref 150–450)
PO2 BLDV: 23 MM HG (ref 25–47)
PO2 BLDV: 24 MM HG (ref 25–47)
PO2 BLDV: 26 MM HG (ref 25–47)
PO2 BLDV: 28 MM HG (ref 25–47)
PO2 BLDV: 29 MM HG (ref 25–47)
PO2 BLDV: 30 MM HG (ref 25–47)
POTASSIUM SERPL-SCNC: 3.1 MMOL/L (ref 3.4–5.3)
POTASSIUM SERPL-SCNC: 3.2 MMOL/L (ref 3.4–5.3)
POTASSIUM SERPL-SCNC: 3.6 MMOL/L (ref 3.4–5.3)
POTASSIUM SERPL-SCNC: 4 MMOL/L (ref 3.4–5.3)
RBC # BLD AUTO: 2.7 10E12/L (ref 4.4–5.9)
RBC # BLD AUTO: 2.86 10E12/L (ref 4.4–5.9)
SODIUM SERPL-SCNC: 140 MMOL/L (ref 133–144)
SODIUM SERPL-SCNC: 145 MMOL/L (ref 133–144)
SPECIMEN SOURCE: NORMAL
WBC # BLD AUTO: 7.3 10E9/L (ref 4–11)
WBC # BLD AUTO: 7.5 10E9/L (ref 4–11)

## 2018-12-09 PROCEDURE — 85610 PROTHROMBIN TIME: CPT | Performed by: THORACIC SURGERY (CARDIOTHORACIC VASCULAR SURGERY)

## 2018-12-09 PROCEDURE — 25000128 H RX IP 250 OP 636: Performed by: THORACIC SURGERY (CARDIOTHORACIC VASCULAR SURGERY)

## 2018-12-09 PROCEDURE — 83735 ASSAY OF MAGNESIUM: CPT | Performed by: THORACIC SURGERY (CARDIOTHORACIC VASCULAR SURGERY)

## 2018-12-09 PROCEDURE — 25000132 ZZH RX MED GY IP 250 OP 250 PS 637: Performed by: SURGERY

## 2018-12-09 PROCEDURE — 25000132 ZZH RX MED GY IP 250 OP 250 PS 637: Performed by: NURSE PRACTITIONER

## 2018-12-09 PROCEDURE — 97530 THERAPEUTIC ACTIVITIES: CPT | Mod: GO | Performed by: OCCUPATIONAL THERAPIST

## 2018-12-09 PROCEDURE — 40000193 ZZH STATISTIC PT WARD VISIT: Performed by: PHYSICAL THERAPIST

## 2018-12-09 PROCEDURE — 93308 TTE F-UP OR LMTD: CPT | Mod: 26 | Performed by: INTERNAL MEDICINE

## 2018-12-09 PROCEDURE — 25000132 ZZH RX MED GY IP 250 OP 250 PS 637: Performed by: INTERNAL MEDICINE

## 2018-12-09 PROCEDURE — 25000132 ZZH RX MED GY IP 250 OP 250 PS 637: Performed by: THORACIC SURGERY (CARDIOTHORACIC VASCULAR SURGERY)

## 2018-12-09 PROCEDURE — 25000128 H RX IP 250 OP 636: Performed by: SURGERY

## 2018-12-09 PROCEDURE — 93321 DOPPLER ECHO F-UP/LMTD STD: CPT | Mod: 26 | Performed by: INTERNAL MEDICINE

## 2018-12-09 PROCEDURE — 85520 HEPARIN ASSAY: CPT | Performed by: THORACIC SURGERY (CARDIOTHORACIC VASCULAR SURGERY)

## 2018-12-09 PROCEDURE — 25000125 ZZHC RX 250: Performed by: INTERNAL MEDICINE

## 2018-12-09 PROCEDURE — 25000125 ZZHC RX 250: Performed by: THORACIC SURGERY (CARDIOTHORACIC VASCULAR SURGERY)

## 2018-12-09 PROCEDURE — 82805 BLOOD GASES W/O2 SATURATION: CPT | Performed by: THORACIC SURGERY (CARDIOTHORACIC VASCULAR SURGERY)

## 2018-12-09 PROCEDURE — 71045 X-RAY EXAM CHEST 1 VIEW: CPT

## 2018-12-09 PROCEDURE — 80048 BASIC METABOLIC PNL TOTAL CA: CPT | Performed by: NURSE PRACTITIONER

## 2018-12-09 PROCEDURE — 85027 COMPLETE CBC AUTOMATED: CPT | Performed by: SURGERY

## 2018-12-09 PROCEDURE — 93308 TTE F-UP OR LMTD: CPT

## 2018-12-09 PROCEDURE — 25000128 H RX IP 250 OP 636: Performed by: INTERNAL MEDICINE

## 2018-12-09 PROCEDURE — 93325 DOPPLER ECHO COLOR FLOW MAPG: CPT | Mod: 26 | Performed by: INTERNAL MEDICINE

## 2018-12-09 PROCEDURE — 97535 SELF CARE MNGMENT TRAINING: CPT | Mod: GO | Performed by: OCCUPATIONAL THERAPIST

## 2018-12-09 PROCEDURE — 84132 ASSAY OF SERUM POTASSIUM: CPT | Performed by: THORACIC SURGERY (CARDIOTHORACIC VASCULAR SURGERY)

## 2018-12-09 PROCEDURE — 97116 GAIT TRAINING THERAPY: CPT | Mod: GP | Performed by: PHYSICAL THERAPIST

## 2018-12-09 PROCEDURE — 85027 COMPLETE CBC AUTOMATED: CPT | Performed by: NURSE PRACTITIONER

## 2018-12-09 PROCEDURE — 97530 THERAPEUTIC ACTIVITIES: CPT | Mod: GP | Performed by: PHYSICAL THERAPIST

## 2018-12-09 PROCEDURE — 40000196 ZZH STATISTIC RAPCV CVP MONITORING

## 2018-12-09 PROCEDURE — 87205 SMEAR GRAM STAIN: CPT | Performed by: THORACIC SURGERY (CARDIOTHORACIC VASCULAR SURGERY)

## 2018-12-09 PROCEDURE — 99233 SBSQ HOSP IP/OBS HIGH 50: CPT | Mod: GC | Performed by: INTERNAL MEDICINE

## 2018-12-09 PROCEDURE — 00000146 ZZHCL STATISTIC GLUCOSE BY METER IP

## 2018-12-09 PROCEDURE — 87070 CULTURE OTHR SPECIMN AEROBIC: CPT | Performed by: THORACIC SURGERY (CARDIOTHORACIC VASCULAR SURGERY)

## 2018-12-09 PROCEDURE — 80048 BASIC METABOLIC PNL TOTAL CA: CPT | Performed by: SURGERY

## 2018-12-09 PROCEDURE — 20000004 ZZH R&B ICU UMMC

## 2018-12-09 PROCEDURE — 84100 ASSAY OF PHOSPHORUS: CPT | Performed by: THORACIC SURGERY (CARDIOTHORACIC VASCULAR SURGERY)

## 2018-12-09 PROCEDURE — 40000048 ZZH STATISTIC DAILY SWAN MONITORING

## 2018-12-09 PROCEDURE — 40000133 ZZH STATISTIC OT WARD VISIT: Performed by: OCCUPATIONAL THERAPIST

## 2018-12-09 RX ORDER — CAPTOPRIL 12.5 MG/1
12.5 TABLET ORAL 3 TIMES DAILY
Status: DISCONTINUED | OUTPATIENT
Start: 2018-12-09 | End: 2018-12-10

## 2018-12-09 RX ORDER — WARFARIN SODIUM 3 MG/1
3 TABLET ORAL
Status: COMPLETED | OUTPATIENT
Start: 2018-12-09 | End: 2018-12-09

## 2018-12-09 RX ORDER — HYDRALAZINE HYDROCHLORIDE 25 MG/1
25 TABLET, FILM COATED ORAL EVERY 6 HOURS
Status: DISCONTINUED | OUTPATIENT
Start: 2018-12-09 | End: 2018-12-09

## 2018-12-09 RX ORDER — FUROSEMIDE 10 MG/ML
40 INJECTION INTRAMUSCULAR; INTRAVENOUS ONCE
Status: COMPLETED | OUTPATIENT
Start: 2018-12-09 | End: 2018-12-09

## 2018-12-09 RX ADMIN — WARFARIN SODIUM 3 MG: 3 TABLET ORAL at 18:11

## 2018-12-09 RX ADMIN — POTASSIUM CHLORIDE 20 MEQ: 29.8 INJECTION, SOLUTION INTRAVENOUS at 17:41

## 2018-12-09 RX ADMIN — SODIUM PHOSPHATE, MONOBASIC, MONOHYDRATE AND SODIUM PHOSPHATE, DIBASIC, ANHYDROUS 10 MMOL: 276; 142 INJECTION, SOLUTION INTRAVENOUS at 10:15

## 2018-12-09 RX ADMIN — MUPIROCIN 1 G: 20 OINTMENT TOPICAL at 07:37

## 2018-12-09 RX ADMIN — FUROSEMIDE 40 MG: 10 INJECTION, SOLUTION INTRAVENOUS at 18:13

## 2018-12-09 RX ADMIN — LIDOCAINE 2 PATCH: 560 PATCH PERCUTANEOUS; TOPICAL; TRANSDERMAL at 19:51

## 2018-12-09 RX ADMIN — POTASSIUM CHLORIDE 20 MEQ: 29.8 INJECTION, SOLUTION INTRAVENOUS at 05:00

## 2018-12-09 RX ADMIN — POTASSIUM CHLORIDE 20 MEQ: 29.8 INJECTION, SOLUTION INTRAVENOUS at 19:01

## 2018-12-09 RX ADMIN — PANTOPRAZOLE SODIUM 40 MG: 40 TABLET, DELAYED RELEASE ORAL at 07:37

## 2018-12-09 RX ADMIN — HEPARIN SODIUM 1400 UNITS/HR: 10000 INJECTION, SOLUTION INTRAVENOUS at 07:35

## 2018-12-09 RX ADMIN — THIAMINE HCL TAB 100 MG 100 MG: 100 TAB at 07:37

## 2018-12-09 RX ADMIN — HYDRALAZINE HYDROCHLORIDE 25 MG: 25 TABLET ORAL at 08:24

## 2018-12-09 RX ADMIN — SENNOSIDES AND DOCUSATE SODIUM 2 TABLET: 8.6; 5 TABLET ORAL at 19:49

## 2018-12-09 RX ADMIN — POLYETHYLENE GLYCOL 3350 17 G: 17 POWDER, FOR SOLUTION ORAL at 19:49

## 2018-12-09 RX ADMIN — Medication 6.25 MG: at 14:17

## 2018-12-09 RX ADMIN — POLYETHYLENE GLYCOL 3350 17 G: 17 POWDER, FOR SOLUTION ORAL at 07:36

## 2018-12-09 RX ADMIN — SODIUM NITROPRUSSIDE 0.25 MCG/KG/MIN: 25 INJECTION INTRAVENOUS at 09:43

## 2018-12-09 RX ADMIN — POTASSIUM CHLORIDE 40 MEQ: 750 TABLET, EXTENDED RELEASE ORAL at 22:40

## 2018-12-09 RX ADMIN — CAPTOPRIL 12.5 MG: 12.5 TABLET ORAL at 19:49

## 2018-12-09 RX ADMIN — SENNOSIDES AND DOCUSATE SODIUM 2 TABLET: 8.6; 5 TABLET ORAL at 07:36

## 2018-12-09 RX ADMIN — ASPIRIN 81 MG CHEWABLE TABLET 81 MG: 81 TABLET CHEWABLE at 07:36

## 2018-12-09 RX ADMIN — ATORVASTATIN CALCIUM 10 MG: 10 TABLET, FILM COATED ORAL at 07:37

## 2018-12-09 ASSESSMENT — ACTIVITIES OF DAILY LIVING (ADL)
ADLS_ACUITY_SCORE: 15
ADLS_ACUITY_SCORE: 19

## 2018-12-09 ASSESSMENT — MIFFLIN-ST. JEOR: SCORE: 1678.25

## 2018-12-09 NOTE — PROGRESS NOTES
Cardiology - Heart failure service    Interval events: Mixed venous dropped overnight, started on nipride gtt this am.      Temp:  [96  F (35.6  C)-98.3  F (36.8  C)] 97.8  F (36.6  C)  Heart Rate:  [] 109  Resp:  [14-22] 18  BP: ()/(56-95) 108/95  MAP:  [75 mmHg-103 mmHg] 101 mmHg  Arterial Line BP: ()/(61-87) 113/85  SpO2:  [87 %-100 %] 100 %  Tele: No events.    Hemodynamics:  CVP 13, PA 38/21, PAWP 13, CI 3.3, SVO2 38, SVR 1200   LVAD:   Flow 4.1, Speed: 5400, PI 3.9-5; no flow alarms or power spikes. Power 4.1.     I/O last 3 completed shifts:  In: 2715.48 [P.O.:1880; I.V.:835.48]  Out: 3355 [Urine:2365; Chest Tube:990], out 525 urine since midnight.     Hemodynamic drips: None.  Selected medications: aspirin 81, atrova 10, PPI.     Physical examination:  Vitals:    12/04/18 0801 12/05/18 0511 12/06/18 0200 12/07/18 0200   Weight: 81.9 kg (180 lb 8 oz) 81.9 kg (180 lb 9.6 oz) 86.6 kg (190 lb 14.7 oz) 87.6 kg (193 lb 2 oz)    12/09/18 0627   Weight: 87.2 kg (192 lb 3.9 oz)     General: lying in bed, appear tired but comfortable  Resp: scattered rales, breathing comfortably  CV: regular, tachycardic, VAD hum, no JVD, incisions clean  Abdomen: Soft, Non-distended, Non-tender  Extremities: warm and well perfused, no edema    Labs were reviewed.  BMP  Recent Labs   Lab 12/09/18  0417 12/08/18  1504 12/08/18  0953 12/08/18  0400   * 146* 146* 145*   POTASSIUM 3.6 3.5 4.1 4.1   CHLORIDE 109 111* 112* 112*   ASHLEE 8.4* 8.6 8.7 9.0   CO2 26 28 26 23   BUN 28 22 21 18   CR 0.84 0.81 0.80 0.78   GLC 93 128* 91 99     LFTs  Recent Labs   Lab 12/07/18  0403 12/04/18  0800 12/03/18  1850   ALKPHOS 72 97 99   * 14 12   ALT 18 13 14   BILITOTAL 5.8* 2.0* 2.2*   PROTTOTAL 5.2* 6.8 7.1   ALBUMIN 3.1* 3.2* 3.2*      CBC  Recent Labs   Lab 12/09/18  0417 12/08/18  1504 12/08/18  0953 12/08/18  0400   WBC 7.5 8.1 8.8 9.6   RBC 2.86* 2.80* 2.95* 3.05*   HGB 8.6* 8.4* 8.9* 9.1*   HCT 27.9* 27.8* 29.3*  30.1*   MCV 98 99 99 99   MCH 30.1 30.0 30.2 29.8   MCHC 30.8* 30.2* 30.4* 30.2*   RDW 18.4* 18.7* 19.1* 18.9*   PLT 84* 76* 83* 90*     INR  Recent Labs   Lab 12/07/18  0403 12/06/18  1148 12/06/18  0336 12/05/18  1455   INR 2.05* 2.09* 1.81* 1.67*       IMPRESSION & PLAN  Danielito Chu is a 63 year-old male with a PMHx s/f SHRILEY, HTN, CHF (LVEF 15%), NIDDM2 who underwent HeartMate III LVAD placement on 12/5/18 with Dr. Silva.      #h/o HTN  #CHF s/p Heartmate III LVAD placement 12/5  - off pressors  - RV had mild dysfunction prior to LVAD. On nipride gtt right now. Uptitrating captopril, next dose at 10 pm at 12.5 mg.   - extubated 12/7  - on Aspirin 81mg  - on low intensity heparin drip, holding warfarin for now  - increase LVAD speed to 5400 RPM, tolerating well.  - fevers improved, cultures remain no growth     Patient seen and evaluated with the attending cardiologist Dr. Mondragon    Thank you for allowing me to care for this patient. This has been discussed with the attending physician.    Muna Meyer MD  Cardiology Fellow, PGY-5

## 2018-12-09 NOTE — PHARMACY-ANTICOAGULATION SERVICE
Clinical Pharmacy - Warfarin Dosing Consult     Pharmacy has been consulted to manage this patient s warfarin therapy.  Indication: LVAD/RVAD  Therapy Goal: INR 2-3  Warfarin Prior to Admission: No  Significant drug interactions: Aspirin, heparin gtt    INR   Date Value Ref Range Status   12/09/2018 1.41 (H) 0.86 - 1.14 Final   12/07/2018 2.05 (H) 0.86 - 1.14 Final     Factor 2 Assay   Date Value Ref Range Status   10/27/2018 70 60 - 140 % Final     Comment:     The Factor 2 activity level is not a screening test for the Prothrombin 46193   mutation.         Recommend warfarin 3 mg today.  Pharmacy will monitor Danielito Chu daily and order warfarin doses to achieve specified goal.      Please contact pharmacy as soon as possible if the warfarin needs to be held for a procedure or if the warfarin goals change.      Khadijah Dupree, PharmD, BCPS

## 2018-12-09 NOTE — PLAN OF CARE
Discharge Planner OT   Patient plan for discharge: Home  Current status: Pt Min A bed mobility, CGA-min A STS pivot to chair, pt w/ continued VC to adhere to post surgical precautions, OT educated pt on CR folder, 2/2 no recall of initial education. VSS throughout.   Barriers to return to prior living situation: medical status  Recommendations for discharge: ARU, may progress to home w/ OP therapy pending pt progress  Rationale for recommendations: to increase ind in ADLS/IADLS       Entered by: Zeny Rivera 12/09/2018 2:48 PM

## 2018-12-09 NOTE — OP NOTE
Procedure Date: 12/05/2018      PREOPERATIVE DIAGNOSES:   1.  Dilated cardiomyopathy, cardiogenic shock.   2.  Nonischemic cardiomyopathy.      POSTOPERATIVE DIAGNOSES:   1.  Dilated cardiomyopathy, cardiogenic shock.     2.  Nonischemic cardiomyopathy.      PROCEDURE:  Minimally invasive placement of HeartMate III left ventricular assist device (intracorporeal left ventricular device).      SURGEON:  Andrei Silva MD      COSURGEON:  Bhavin Wilcox MD      NOTE:  A second attending surgeon was required for the operation because of complexity of the operation as well as the absence of any qualified resident or fellow.      ASSISTANT:  Claudio Platt.        NOTE:  Please refer to the full details by Dr. Andrei Silva who was the primary surgeon.      DESCRIPTION OF PROCEDURE:  The patient was brought to the operating room in stable condition.  After administering anesthesia, the patient's abdomen was prepped and draped in usual manner.  An upper median hemisternotomy was performed.  Next, a left anterior thoracotomy was made in the left sixth intercostal space.  The apex of heart was identified.  Prep of the coronary bypass of ACT-guided heparinization and admission of [place].  The cardiopulmonary bypass was initiated in the usual standard fashion.  The inflow cannula was then secured via the usual standard fashion by sewing the apical ring onto the anterolateral surface of the heart.  Coring knife was used to core a portion of his left ventricle.  Inflow cannula with the pump was inserted in the usual sterile fashion.  Driveline was tunneled out right upper quadrant incision.  Outflow graft was brought into the left hemisternotomy incision and sewn to a large aortotomy was 4-0 suture.  De-airing maneuvers were performed in the usual standard fashion.  The patient gradually weaned off cardiopulmonary bypass and LVAD pump was initiated.  Initial treatment with staple.  Both the sternum and the pericardium was closed  in the usual sterile fashion.  As cosurgeon, I performed specific parts of the operation.          HENRY WOLF MD             D: 2018   T: 2018   MT: DENICE      Name:     JUAN SHEN   MRN:      [id number]        Account:        [id number]   :      1955           Procedure Date: 2018      Document: [id number]       cc: [facility] Surgery Billing

## 2018-12-09 NOTE — PROGRESS NOTES
CV ICU PROGRESS NOTE  December 9, 2018    CO-MORBIDITIES:   Nocturnal oxygen desaturation  (primary encounter diagnosis)  Acute on chronic systolic heart failure (H)    ASSESSMENT: Danielito Chu is a 63 year-old male with a PMHx s/f SHIRLEY, HTN, CHF (LVEF 15%), NIDDM2 who underwent LVAD placement on 12/5/18 with Dr. Silva. Extubated 12/7.    TODAY'S PROGRESS:   - CT output too high for removal   - diuresis and nipride per cards  - Pulmonary toilet    PLAN:   Neuro/ pain/ sedation:  - Monitor neurological status. Notify the MD for any acute changes in exam.  - Pain: Fentanyl gtt, Oxy/Dilaudid/tylenol prn, Lidoderm.     Pulmonary care:   #SHIRLEY  - CPAP overnight as tolerated   #pHTN  - Aggressive pulmonary toilet given ateletasis     Cardiovascular:    #h/o HTN  #CHF s/p Heartmate III LVAD placement 12/5  - Monitor hemodynamic status.   - ASA 81mg  - Atorvastatin 10mg  - Nipride per cards     GI care:   - Regular diet  - Bowel regimen: Miralax, Senna-Colace       Fluids/ Electrolytes/ Nutrition:   - ICU electrolyte replacement protocol  - Nutrition consulted. Appreciate recs     Renal/ Fluid Balance:    - Will monitor intake and output.  - diuresis per cards       Endocrine:  #NIDDM2    - SSI       ID/ Antibiotics:  - Completing a course of periop anti-microbials: Fluconazole, Levaquin, Rifampin, Vancomycin (48h post-op)       Heme:     - hgb stable       Prophylaxis:    - Mechanical prophylaxis for DVT.  - Low intensity heparin       MSK:    - PT and OT consulted. Appreciate recs.       Lines/ tubes/ drains:  - RIJ MAC with [place]  - CTx4 (left pleural, meds x2, pericardial)- output too high for removal  - PIVx1       Disposition:  - CV ICU.    Nayely Stiles MD  General Surgery, PGY-3  Pg [phone]      ====================================    SUBJECTIVE:   No acute events overnight. Pain well controlled. Tolerating regular diet. No BM     OBJECTIVE:   1. VITAL SIGNS:   Temp:  [97.8  F (36.6  C)-98.3  F (36.8  C)]  98.1  F (36.7  C)  Heart Rate:  [] 101  Resp:  [11-32] 21  BP: ()/(31-95) 82/64  MAP:  [101 mmHg-103 mmHg] 101 mmHg  Arterial Line BP: (111-113)/(85-86) 113/85  SpO2:  [87 %-100 %] 96 %  Resp: 21      2. INTAKE/ OUTPUT:   I/O last 3 completed shifts:  In: 3294.35 [P.O.:2180; I.V.:1114.35]  Out: 2640 [Urine:1900; Chest Tube:740]    3. PHYSICAL EXAMINATION:   General: sitting up in chair  Neuro: Alert and oriented x3, NAD  Resp: Non-labored breathign  CV: RRR  Abdomen: Soft, Non-distended, Non-tender  Incisions: c/d/i  Extremities: warm and well perfused    4. INVESTIGATIONS:   Arterial Blood Gases   Recent Labs   Lab 12/08/18  0953 12/07/18  1617 12/07/18  1020 12/07/18  0328   PH 7.40 7.38 7.38 7.43   PCO2 42 40 44 38   PO2 84 128* 102 92   HCO3 26 24 26 25     Complete Blood Count   Recent Labs   Lab 12/09/18  1641 12/09/18  0417 12/08/18  1504 12/08/18  0953   WBC 7.3 7.5 8.1 8.8   HGB 8.0* 8.6* 8.4* 8.9*   PLT 94* 84* 76* 83*     Basic Metabolic Panel  Recent Labs   Lab 12/09/18  1641 12/09/18  0757 12/09/18  0417 12/08/18  1504 12/08/18  0953     --  145* 146* 146*   POTASSIUM 3.2* 4.0 3.6 3.5 4.1   CHLORIDE 106  --  109 111* 112*   CO2 27  --  26 28 26   BUN 27  --  28 22 21   CR 0.78  --  0.84 0.81 0.80   *  --  93 128* 91     Liver Function Tests  Recent Labs   Lab 12/09/18  0757 12/07/18  0403 12/06/18  1148 12/06/18  0336  12/04/18  0800 12/03/18  1850   AST  --  117*  --   --   --  14 12   ALT  --  18  --   --   --  13 14   ALKPHOS  --  72  --   --   --  97 99   BILITOTAL  --  5.8*  --   --   --  2.0* 2.2*   ALBUMIN  --  3.1*  --   --   --  3.2* 3.2*   INR 1.41* 2.05* 2.09* 1.81*   < >  --   --     < > = values in this interval not displayed.     Pancreatic Enzymes  No lab results found in last 7 days.  Coagulation Profile  Recent Labs   Lab 12/09/18  0757 12/07/18  0403 12/06/18  1148 12/06/18  0336 12/05/18  1455 12/05/18  1220   INR 1.41* 2.05* 2.09* 1.81* 1.67* 2.03*   PTT  --   60*  --  38* 60* 35         5. RADIOLOGY:   Recent Results (from the past 24 hour(s))   XR Chest Port 1 View    Narrative    Exam: XR CHEST PORT 1 VW, 12/9/2018 8:52 AM    Indication: intubated, LVAD; intubated, LVAD    Comparison: CT chest 12/08/2018    Findings:   AP single view of the chest. Tip of the right IJ [place]-Soy catheter  projects over the right main pulmonary artery. Implantable cardiac  defibrillator device with its leads in the expected position. Tip of  the right PICC projects over the cavoatrial junction. Postsurgical  changes of LVAD. 2 mediastinal drains in place. Left basilar chest  tube in place. Trace right apical pneumothorax. No significant left  pneumothorax. Bibasilar streaky opacity. Mild right pleural effusion.  Small bilateral pleural effusions.      Impression    Impression:   1. Small/tiny right apical pneumothorax  2. Left-sided chest in place with no left apical pneumothorax.  3. Streaky bibasilar opacities, likely atelectasis and pleural  effusions.  4. Small to moderate right pleural effusion. Trace left pleural  effusion.    I have personally reviewed the examination and initial interpretation  and I agree with the findings.    OTILIA STARR MD       =========================================

## 2018-12-09 NOTE — PLAN OF CARE
PT 4E: Discharge Planner PT   Patient plan for discharge: local Hospitals in [place]   Current status: pt ambulates 125ft with FWW and min A, heavy assist for line management. Vitals stable on room air, pt needs min A and cues for STS transfers within precautions.   Barriers to return to prior living situation: post surgical precautions, assist for mobility, medical status   Recommendations for discharge: TCU at this time   Rationale for recommendations: pt needing further therapy to return to PLOF as pt IND with mobility at baseline. Pt making fast progress after therapy, with continued progress pt may be appropriate for discharge to local housing and then to prior living situation.        Entered by: Ashley Chairez 12/09/2018 2:09 PM

## 2018-12-09 NOTE — PROGRESS NOTES
CVTS PROGRESS NOTE  December 9, 2018    CO-MORBIDITIES:   Nocturnal oxygen desaturation  (primary encounter diagnosis)  Acute on chronic systolic heart failure (H)    ASSESSMENT: Danielito Chu is a 63 year-old male with a PMHx s/f SHIRLEY, HTN, CHF (LVEF 15%), NIDDM2 who underwent LVAD placement on 12/5/18 with Dr. Silva. Extubated 12/7.    TODAY'S PROGRESS:   - CT output too high for removal   - diuresis and nipride per cards  - Pulmonary toilet    PLAN:   Neuro/ pain/ sedation:  - Monitor neurological status. Notify the MD for any acute changes in exam.  - Pain: Fentanyl gtt, Oxy/Dilaudid/tylenol prn, Lidoderm.     Pulmonary care:   #SHIRLEY  - CPAP overnight as tolerated   #pHTN  - Aggressive pulmonary toilet given ateletasis     Cardiovascular:    #h/o HTN  #CHF s/p Heartmate III LVAD placement 12/5  - Monitor hemodynamic status.   - ASA 81mg  - Atorvastatin 10mg  - Nipride per cards     GI care:   - Regular diet  - Bowel regimen: Miralax, Senna-Colace       Fluids/ Electrolytes/ Nutrition:   - ICU electrolyte replacement protocol  - Nutrition consulted. Appreciate recs     Renal/ Fluid Balance:    - Will monitor intake and output.  - diuresis per cards       Endocrine:  #NIDDM2    - SSI       ID/ Antibiotics:  - Completing a course of periop anti-microbials: Fluconazole, Levaquin, Rifampin, Vancomycin (48h post-op)       Heme:     - hgb stable       Prophylaxis:    - Mechanical prophylaxis for DVT.  - Low intensity heparin       MSK:    - PT and OT consulted. Appreciate recs.       Lines/ tubes/ drains:  - RIJ MAC with [place]  - CTx4 (left pleural, meds x2, pericardial)- output too high for removal  - PIVx1       Disposition:  - CV ICU.    Nayely Stiles MD  General Surgery, PGY-3  Pg [phone]      ====================================    SUBJECTIVE:   No acute events overnight. Pain well controlled. Tolerating regular diet. No BM     OBJECTIVE:   1. VITAL SIGNS:   Temp:  [97.8  F (36.6  C)-98.3  F (36.8  C)]  98.1  F (36.7  C)  Heart Rate:  [] 101  Resp:  [11-32] 21  BP: ()/(31-95) 82/64  MAP:  [101 mmHg] 101 mmHg  Arterial Line BP: (113)/(85) 113/85  SpO2:  [87 %-100 %] 96 %  Resp: 21      2. INTAKE/ OUTPUT:   I/O last 3 completed shifts:  In: 3294.35 [P.O.:2180; I.V.:1114.35]  Out: 2640 [Urine:1900; Chest Tube:740]    3. PHYSICAL EXAMINATION:   General: sitting up in chair  Neuro: Alert and oriented x3, NAD  Resp: Non-labored breathign  CV: RRR  Abdomen: Soft, Non-distended, Non-tender  Incisions: c/d/i  Extremities: warm and well perfused    4. INVESTIGATIONS:   Arterial Blood Gases   Recent Labs   Lab 12/08/18  0953 12/07/18  1617 12/07/18  1020 12/07/18  0328   PH 7.40 7.38 7.38 7.43   PCO2 42 40 44 38   PO2 84 128* 102 92   HCO3 26 24 26 25     Complete Blood Count   Recent Labs   Lab 12/09/18  1641 12/09/18  0417 12/08/18  1504 12/08/18  0953   WBC 7.3 7.5 8.1 8.8   HGB 8.0* 8.6* 8.4* 8.9*   PLT 94* 84* 76* 83*     Basic Metabolic Panel  Recent Labs   Lab 12/09/18  1641 12/09/18  0757 12/09/18  0417 12/08/18  1504 12/08/18  0953     --  145* 146* 146*   POTASSIUM 3.2* 4.0 3.6 3.5 4.1   CHLORIDE 106  --  109 111* 112*   CO2 27  --  26 28 26   BUN 27  --  28 22 21   CR 0.78  --  0.84 0.81 0.80   *  --  93 128* 91     Liver Function Tests  Recent Labs   Lab 12/09/18  0757 12/07/18  0403 12/06/18  1148 12/06/18  0336  12/04/18  0800 12/03/18  1850   AST  --  117*  --   --   --  14 12   ALT  --  18  --   --   --  13 14   ALKPHOS  --  72  --   --   --  97 99   BILITOTAL  --  5.8*  --   --   --  2.0* 2.2*   ALBUMIN  --  3.1*  --   --   --  3.2* 3.2*   INR 1.41* 2.05* 2.09* 1.81*   < >  --   --     < > = values in this interval not displayed.     Pancreatic Enzymes  No lab results found in last 7 days.  Coagulation Profile  Recent Labs   Lab 12/09/18  0757 12/07/18  0403 12/06/18  1148 12/06/18  0336 12/05/18  1455 12/05/18  1220   INR 1.41* 2.05* 2.09* 1.81* 1.67* 2.03*   PTT  --  60*  --  38*  60* 35         5. RADIOLOGY:   Recent Results (from the past 24 hour(s))   XR Chest Port 1 View    Narrative    Exam: XR CHEST PORT 1 VW, 12/9/2018 8:52 AM    Indication: intubated, LVAD; intubated, LVAD    Comparison: CT chest 12/08/2018    Findings:   AP single view of the chest. Tip of the right IJ [place]-Soy catheter  projects over the right main pulmonary artery. Implantable cardiac  defibrillator device with its leads in the expected position. Tip of  the right PICC projects over the cavoatrial junction. Postsurgical  changes of LVAD. 2 mediastinal drains in place. Left basilar chest  tube in place. Trace right apical pneumothorax. No significant left  pneumothorax. Bibasilar streaky opacity. Mild right pleural effusion.  Small bilateral pleural effusions.      Impression    Impression:   1. Small/tiny right apical pneumothorax  2. Left-sided chest in place with no left apical pneumothorax.  3. Streaky bibasilar opacities, likely atelectasis and pleural  effusions.  4. Small to moderate right pleural effusion. Trace left pleural  effusion.    I have personally reviewed the examination and initial interpretation  and I agree with the findings.    OTILIA STARR MD       =========================================

## 2018-12-10 ENCOUNTER — APPOINTMENT (OUTPATIENT)
Dept: GENERAL RADIOLOGY | Facility: CLINIC | Age: 63
DRG: 001 | End: 2018-12-10
Attending: INTERNAL MEDICINE
Payer: COMMERCIAL

## 2018-12-10 ENCOUNTER — CARE COORDINATION (OUTPATIENT)
Dept: CARDIOLOGY | Facility: CLINIC | Age: 63
End: 2018-12-10

## 2018-12-10 ENCOUNTER — APPOINTMENT (OUTPATIENT)
Dept: OCCUPATIONAL THERAPY | Facility: CLINIC | Age: 63
DRG: 001 | End: 2018-12-10
Attending: INTERNAL MEDICINE
Payer: COMMERCIAL

## 2018-12-10 DIAGNOSIS — Z95.811 LVAD (LEFT VENTRICULAR ASSIST DEVICE) PRESENT (H): Primary | ICD-10-CM

## 2018-12-10 LAB
ANION GAP SERPL CALCULATED.3IONS-SCNC: 7 MMOL/L (ref 3–14)
ANION GAP SERPL CALCULATED.3IONS-SCNC: 8 MMOL/L (ref 3–14)
BASE DEFICIT BLDV-SCNC: 3.5 MMOL/L
BASE EXCESS BLDV CALC-SCNC: 1.4 MMOL/L
BASE EXCESS BLDV CALC-SCNC: 3 MMOL/L
BASE EXCESS BLDV CALC-SCNC: 3.6 MMOL/L
BASE EXCESS BLDV CALC-SCNC: 4 MMOL/L
BUN SERPL-MCNC: 13 MG/DL (ref 7–30)
BUN SERPL-MCNC: 20 MG/DL (ref 7–30)
CALCIUM SERPL-MCNC: 6.7 MG/DL (ref 8.5–10.1)
CALCIUM SERPL-MCNC: 8 MG/DL (ref 8.5–10.1)
CHLORIDE SERPL-SCNC: 108 MMOL/L (ref 94–109)
CHLORIDE SERPL-SCNC: 115 MMOL/L (ref 94–109)
CO2 SERPL-SCNC: 22 MMOL/L (ref 20–32)
CO2 SERPL-SCNC: 26 MMOL/L (ref 20–32)
CREAT SERPL-MCNC: 0.56 MG/DL (ref 0.66–1.25)
CREAT SERPL-MCNC: 0.68 MG/DL (ref 0.66–1.25)
ERYTHROCYTE [DISTWIDTH] IN BLOOD BY AUTOMATED COUNT: 18.3 % (ref 10–15)
ERYTHROCYTE [DISTWIDTH] IN BLOOD BY AUTOMATED COUNT: 18.5 % (ref 10–15)
GFR SERPL CREATININE-BSD FRML MDRD: >90 ML/MIN/1.7M2
GFR SERPL CREATININE-BSD FRML MDRD: >90 ML/MIN/1.7M2
GLUCOSE BLDC GLUCOMTR-MCNC: 108 MG/DL (ref 70–99)
GLUCOSE BLDC GLUCOMTR-MCNC: 139 MG/DL (ref 70–99)
GLUCOSE BLDC GLUCOMTR-MCNC: 87 MG/DL (ref 70–99)
GLUCOSE SERPL-MCNC: 109 MG/DL (ref 70–99)
GLUCOSE SERPL-MCNC: 94 MG/DL (ref 70–99)
HCO3 BLDV-SCNC: 21 MMOL/L (ref 21–28)
HCO3 BLDV-SCNC: 26 MMOL/L (ref 21–28)
HCO3 BLDV-SCNC: 27 MMOL/L (ref 21–28)
HCO3 BLDV-SCNC: 28 MMOL/L (ref 21–28)
HCO3 BLDV-SCNC: 28 MMOL/L (ref 21–28)
HCT VFR BLD AUTO: 25.4 % (ref 40–53)
HCT VFR BLD AUTO: 25.9 % (ref 40–53)
HGB BLD-MCNC: 8.1 G/DL (ref 13.3–17.7)
HGB BLD-MCNC: 8.1 G/DL (ref 13.3–17.7)
INR PPP: 1.32 (ref 0.86–1.14)
LMWH PPP CHRO-ACNC: 0.1 IU/ML
LMWH PPP CHRO-ACNC: 0.28 IU/ML
LMWH PPP CHRO-ACNC: <0.1 IU/ML
MCH RBC QN AUTO: 29.8 PG (ref 26.5–33)
MCH RBC QN AUTO: 30.1 PG (ref 26.5–33)
MCHC RBC AUTO-ENTMCNC: 31.3 G/DL (ref 31.5–36.5)
MCHC RBC AUTO-ENTMCNC: 31.9 G/DL (ref 31.5–36.5)
MCV RBC AUTO: 94 FL (ref 78–100)
MCV RBC AUTO: 95 FL (ref 78–100)
O2/TOTAL GAS SETTING VFR VENT: 21 %
OXYHGB MFR BLDV: 47 %
OXYHGB MFR BLDV: 49 %
OXYHGB MFR BLDV: 50 %
PCO2 BLDV: 30 MM HG (ref 40–50)
PCO2 BLDV: 39 MM HG (ref 40–50)
PCO2 BLDV: 40 MM HG (ref 40–50)
PCO2 BLDV: 40 MM HG (ref 40–50)
PCO2 BLDV: 41 MM HG (ref 40–50)
PH BLDV: 7.43 PH (ref 7.32–7.43)
PH BLDV: 7.44 PH (ref 7.32–7.43)
PH BLDV: 7.45 PH (ref 7.32–7.43)
PH BLDV: 7.45 PH (ref 7.32–7.43)
PH BLDV: 7.46 PH (ref 7.32–7.43)
PLATELET # BLD AUTO: 102 10E9/L (ref 150–450)
PLATELET # BLD AUTO: 117 10E9/L (ref 150–450)
PO2 BLDV: 27 MM HG (ref 25–47)
PO2 BLDV: 28 MM HG (ref 25–47)
POTASSIUM SERPL-SCNC: 3.2 MMOL/L (ref 3.4–5.3)
POTASSIUM SERPL-SCNC: 4 MMOL/L (ref 3.4–5.3)
RBC # BLD AUTO: 2.69 10E12/L (ref 4.4–5.9)
RBC # BLD AUTO: 2.72 10E12/L (ref 4.4–5.9)
SODIUM SERPL-SCNC: 142 MMOL/L (ref 133–144)
SODIUM SERPL-SCNC: 144 MMOL/L (ref 133–144)
WBC # BLD AUTO: 6.8 10E9/L (ref 4–11)
WBC # BLD AUTO: 7 10E9/L (ref 4–11)

## 2018-12-10 PROCEDURE — 25000132 ZZH RX MED GY IP 250 OP 250 PS 637: Performed by: SURGERY

## 2018-12-10 PROCEDURE — 80048 BASIC METABOLIC PNL TOTAL CA: CPT | Performed by: SURGERY

## 2018-12-10 PROCEDURE — 25000128 H RX IP 250 OP 636: Performed by: SURGERY

## 2018-12-10 PROCEDURE — 40000986 XR CHEST PORT 1 VW

## 2018-12-10 PROCEDURE — 82805 BLOOD GASES W/O2 SATURATION: CPT | Performed by: THORACIC SURGERY (CARDIOTHORACIC VASCULAR SURGERY)

## 2018-12-10 PROCEDURE — 25000132 ZZH RX MED GY IP 250 OP 250 PS 637: Performed by: THORACIC SURGERY (CARDIOTHORACIC VASCULAR SURGERY)

## 2018-12-10 PROCEDURE — 20000004 ZZH R&B ICU UMMC

## 2018-12-10 PROCEDURE — 40000196 ZZH STATISTIC RAPCV CVP MONITORING

## 2018-12-10 PROCEDURE — 85027 COMPLETE CBC AUTOMATED: CPT | Performed by: SURGERY

## 2018-12-10 PROCEDURE — 25000132 ZZH RX MED GY IP 250 OP 250 PS 637: Performed by: STUDENT IN AN ORGANIZED HEALTH CARE EDUCATION/TRAINING PROGRAM

## 2018-12-10 PROCEDURE — 25000128 H RX IP 250 OP 636: Performed by: INTERNAL MEDICINE

## 2018-12-10 PROCEDURE — 85520 HEPARIN ASSAY: CPT | Performed by: SURGERY

## 2018-12-10 PROCEDURE — 71045 X-RAY EXAM CHEST 1 VIEW: CPT

## 2018-12-10 PROCEDURE — 40000275 ZZH STATISTIC RCP TIME EA 10 MIN

## 2018-12-10 PROCEDURE — 25000132 ZZH RX MED GY IP 250 OP 250 PS 637: Performed by: NURSE PRACTITIONER

## 2018-12-10 PROCEDURE — 85610 PROTHROMBIN TIME: CPT | Performed by: SURGERY

## 2018-12-10 PROCEDURE — 00000146 ZZHCL STATISTIC GLUCOSE BY METER IP

## 2018-12-10 PROCEDURE — 25000125 ZZHC RX 250: Performed by: INTERNAL MEDICINE

## 2018-12-10 PROCEDURE — 25000132 ZZH RX MED GY IP 250 OP 250 PS 637: Performed by: INTERNAL MEDICINE

## 2018-12-10 PROCEDURE — 40000048 ZZH STATISTIC DAILY SWAN MONITORING

## 2018-12-10 PROCEDURE — 40000133 ZZH STATISTIC OT WARD VISIT: Performed by: OCCUPATIONAL THERAPIST

## 2018-12-10 PROCEDURE — 85520 HEPARIN ASSAY: CPT | Performed by: NURSE PRACTITIONER

## 2018-12-10 PROCEDURE — 99233 SBSQ HOSP IP/OBS HIGH 50: CPT | Mod: GC | Performed by: INTERNAL MEDICINE

## 2018-12-10 PROCEDURE — 97530 THERAPEUTIC ACTIVITIES: CPT | Mod: GO | Performed by: OCCUPATIONAL THERAPIST

## 2018-12-10 RX ORDER — CAPTOPRIL 25 MG/1
25 TABLET ORAL EVERY 8 HOURS
Status: DISCONTINUED | OUTPATIENT
Start: 2018-12-10 | End: 2018-12-10

## 2018-12-10 RX ORDER — POTASSIUM CHLORIDE 1.5 G/1.58G
40 POWDER, FOR SOLUTION ORAL ONCE
Status: COMPLETED | OUTPATIENT
Start: 2018-12-10 | End: 2018-12-10

## 2018-12-10 RX ORDER — LISINOPRIL 5 MG/1
5 TABLET ORAL 2 TIMES DAILY
Status: DISCONTINUED | OUTPATIENT
Start: 2018-12-10 | End: 2018-12-12

## 2018-12-10 RX ORDER — WARFARIN SODIUM 3 MG/1
3 TABLET ORAL
Status: COMPLETED | OUTPATIENT
Start: 2018-12-10 | End: 2018-12-10

## 2018-12-10 RX ADMIN — SENNOSIDES AND DOCUSATE SODIUM 2 TABLET: 8.6; 5 TABLET ORAL at 08:08

## 2018-12-10 RX ADMIN — PANTOPRAZOLE SODIUM 40 MG: 40 TABLET, DELAYED RELEASE ORAL at 08:08

## 2018-12-10 RX ADMIN — LISINOPRIL 5 MG: 5 TABLET ORAL at 19:46

## 2018-12-10 RX ADMIN — POTASSIUM CHLORIDE 40 MEQ: 1.5 POWDER, FOR SOLUTION ORAL at 01:19

## 2018-12-10 RX ADMIN — POTASSIUM CHLORIDE 20 MEQ: 750 TABLET, EXTENDED RELEASE ORAL at 23:18

## 2018-12-10 RX ADMIN — POTASSIUM CHLORIDE 20 MEQ: 750 TABLET, EXTENDED RELEASE ORAL at 09:01

## 2018-12-10 RX ADMIN — ASPIRIN 81 MG CHEWABLE TABLET 81 MG: 81 TABLET CHEWABLE at 08:08

## 2018-12-10 RX ADMIN — Medication 18.75 MG: at 04:04

## 2018-12-10 RX ADMIN — CAPTOPRIL 25 MG: 25 TABLET ORAL at 11:39

## 2018-12-10 RX ADMIN — POTASSIUM CHLORIDE 40 MEQ: 750 TABLET, EXTENDED RELEASE ORAL at 17:47

## 2018-12-10 RX ADMIN — TRAZODONE HYDROCHLORIDE 100 MG: 100 TABLET ORAL at 23:18

## 2018-12-10 RX ADMIN — Medication 6.25 MG: at 08:11

## 2018-12-10 RX ADMIN — HEPARIN SODIUM 1700 UNITS/HR: 10000 INJECTION, SOLUTION INTRAVENOUS at 17:33

## 2018-12-10 RX ADMIN — WARFARIN SODIUM 3 MG: 3 TABLET ORAL at 17:37

## 2018-12-10 RX ADMIN — ATORVASTATIN CALCIUM 10 MG: 10 TABLET, FILM COATED ORAL at 08:08

## 2018-12-10 RX ADMIN — HEPARIN SODIUM 1400 UNITS/HR: 10000 INJECTION, SOLUTION INTRAVENOUS at 01:25

## 2018-12-10 RX ADMIN — SODIUM NITROPRUSSIDE 0.75 MCG/KG/MIN: 25 INJECTION INTRAVENOUS at 03:18

## 2018-12-10 RX ADMIN — THIAMINE HCL TAB 100 MG 100 MG: 100 TAB at 08:08

## 2018-12-10 RX ADMIN — LIDOCAINE 2 PATCH: 560 PATCH PERCUTANEOUS; TOPICAL; TRANSDERMAL at 19:45

## 2018-12-10 RX ADMIN — LISINOPRIL 5 MG: 5 TABLET ORAL at 14:53

## 2018-12-10 ASSESSMENT — ACTIVITIES OF DAILY LIVING (ADL)
ADLS_ACUITY_SCORE: 20
ADLS_ACUITY_SCORE: 19

## 2018-12-10 ASSESSMENT — PAIN DESCRIPTION - DESCRIPTORS: DESCRIPTORS: SORE

## 2018-12-10 ASSESSMENT — MIFFLIN-ST. JEOR: SCORE: 1657.25

## 2018-12-10 NOTE — PLAN OF CARE
Nipride continuing at 0.5 mcg/kg/min, goal MAP less than 70. Captopril increased from 12.5 mg to 18.75 mg, first dose given at 0400. Potassium 3.1, total of 80 mEq given and potassium increased to 4.0. CVP=13,12; PA= 38/24; CI= 2.2; SVR= 1227.8 and SV02= 47.  Chest tubes continuing to drain on dressing. Outputs= 0-25 mL q2 hours. Denies SOB or chest pain. Minimal sleep obtained. Will continue to monitor.

## 2018-12-10 NOTE — PROGRESS NOTES
CV ICU PROGRESS NOTE  December 10, 2018    CO-MORBIDITIES:   Nocturnal oxygen desaturation  (primary encounter diagnosis)  Acute on chronic systolic heart failure (H)    ASSESSMENT: Danielito Chu is a 63 year-old male with a PMHx s/f SHIRLEY, HTN, CHF (LVEF 15%), NIDDM2 who underwent LVAD placement on 12/5/18 with Dr. Silva. Extubated 12/7.    TODAY'S PROGRESS:   - goal even, lasix prn  - Remove mediastinal tubes  - diuresis and nipride per cards  - Pulmonary toilet    PLAN:   Neuro/ pain/ sedation:  - Monitor neurological status. Notify the MD for any acute changes in exam.  - Pain: oxy/Dilaudid/tylenol prn, Lidoderm.     Pulmonary care:   #SHIRLEY  - CPAP overnight as tolerated   #pHTN  - Aggressive pulmonary toilet given ateletasis     Cardiovascular:    #h/o HTN  #CHF s/p Heartmate III LVAD placement 12/5  - Monitor hemodynamic status.   - ASA 81mg  - Atorvastatin 10mg  - Nipride per cards     GI care:   - Regular diet  - Bowel regimen: Miralax, Senna-Colace       Fluids/ Electrolytes/ Nutrition:   - ICU electrolyte replacement protocol  - Nutrition consulted. Appreciate recs  - Supplementes     Renal/ Fluid Balance:    - Will monitor intake and output.  - diuresis per cards       Endocrine:  #NIDDM2    - SSI       ID/ Antibiotics:  - Completed course of periop anti-microbials     Heme:     - hgb stable       Prophylaxis:    - Mechanical prophylaxis for DVT.  - Low intensity heparin until therapeutic on coumadin  - Start coumadin on 12/9, INR goal 2-3       MSK:    - PT and OT consulted. Appreciate recs.       Lines/ tubes/ drains:  - RIJ MAC with [place]  - CTx4 (left pleural, meds x2, pericardial)- remove mediastinal tube  - PIVx1       Disposition:  - CV ICU.    Nayely Stiles MD  General Surgery, PGY-3  Pg [phone]      ====================================    SUBJECTIVE:   No acute events overnight. Pain well controlled. Tolerating regular diet. Passing flatus and BM. Continues on Nipride.     OBJECTIVE:   1.  VITAL SIGNS:   Temp:  [97.9  F (36.6  C)-98.8  F (37.1  C)] 97.9  F (36.6  C)  Heart Rate:  [] 102  Resp:  [8-42] 27  BP: ()/() 81/68  SpO2:  [87 %-100 %] 100 %  Resp: 27      2. INTAKE/ OUTPUT:   I/O last 3 completed shifts:  In: 2760.47 [P.O.:1540; I.V.:1220.47]  Out: 2588 [Urine:2050; Chest Tube:538]    3. PHYSICAL EXAMINATION:   General: sitting up in chair  Neuro: Alert and oriented x3, NAD  Resp: Non-labored breathign  CV: RRR  Abdomen: Soft, Non-distended, Non-tender  Incisions: c/d/i  Extremities: warm and well perfused    4. INVESTIGATIONS:   Arterial Blood Gases   Recent Labs   Lab 12/08/18  0953 12/07/18  1617 12/07/18  1020 12/07/18  0328   PH 7.40 7.38 7.38 7.43   PCO2 42 40 44 38   PO2 84 128* 102 92   HCO3 26 24 26 25     Complete Blood Count   Recent Labs   Lab 12/10/18  0400 12/09/18  1641 12/09/18  0417 12/08/18  1504   WBC 6.8 7.3 7.5 8.1   HGB 8.1* 8.0* 8.6* 8.4*   * 94* 84* 76*     Basic Metabolic Panel  Recent Labs   Lab 12/10/18  0400 12/09/18  2200 12/09/18  1641 12/09/18  0757 12/09/18  0417 12/08/18  1504     --  140  --  145* 146*   POTASSIUM 4.0 3.1* 3.2* 4.0 3.6 3.5   CHLORIDE 108  --  106  --  109 111*   CO2 26  --  27  --  26 28   BUN 20  --  27  --  28 22   CR 0.68  --  0.78  --  0.84 0.81   *  --  150*  --  93 128*     Liver Function Tests  Recent Labs   Lab 12/10/18  0400 12/09/18  0757 12/07/18  0403 12/06/18  1148  12/04/18  0800 12/03/18  1850   AST  --   --  117*  --   --  14 12   ALT  --   --  18  --   --  13 14   ALKPHOS  --   --  72  --   --  97 99   BILITOTAL  --   --  5.8*  --   --  2.0* 2.2*   ALBUMIN  --   --  3.1*  --   --  3.2* 3.2*   INR 1.32* 1.41* 2.05* 2.09*   < >  --   --     < > = values in this interval not displayed.     Pancreatic Enzymes  No lab results found in last 7 days.  Coagulation Profile  Recent Labs   Lab 12/10/18  0400 12/09/18  0757 12/07/18  0403 12/06/18  1148 12/06/18  0336 12/05/18  1455 12/05/18  1220   INR  1.32* 1.41* 2.05* 2.09* 1.81* 1.67* 2.03*   PTT  --   --  60*  --  38* 60* 35         5. RADIOLOGY:   Recent Results (from the past 24 hour(s))   XR Chest Port 1 View    Narrative    XR CHEST PORT 1 VW  12/10/2018 1:05 AM    History:  intubated, LVAD; intubated, LVAD.     Comparison: Chest radiograph dated 12/9/2018    Findings:   Semiupright AP chest radiograph. Stable position of right IJ approach  [place]-Soy catheter, LVAD, left chest wall implantable cardiac  defibrillator, left chest tubes, mediastinal drainage tubes, and right  upper extremity PICC line.    Cardiac silhouette is enlarged, unchanged. Interval decreased  perihilar and bibasilar opacities. No appreciable pneumothorax.  Possible small bilateral pleural effusion.      Impression    IMPRESSION:  1.  Stable supportive lines and tubes.  2.  Cardiomegaly with decreased pulmonary edema.  3.  Stable small bilateral pleural effusion.    I have personally reviewed the examination and initial interpretation  and I agree with the findings.    DIVINA ABDALLA MD       =========================================

## 2018-12-10 NOTE — PROGRESS NOTES
LATE ENTRY:  This patient was implanted with a HeartMate 3 on December 5.  At the time of surgery, he met criteria for NYHA III, Intermacs 3.  Confirmed with Dr. Watkins.

## 2018-12-10 NOTE — PLAN OF CARE
Nipride off at 1245, heparin off after Chest tube removal and oozing from site, CVP 10, PAP 32/12, Sinus tach 100's, normotensive with MAP goal <70- nipride off per cards 2 order despite MAP > 70. svo2 of 49, CI at 2.2, SVR at 1132, Flows 3.5, kilgore 4.0, PI's- 3.0/3.3. Afebrile. Room air, IS to 750, Lungs coarse/crackles. Mediastinal chest tubes removed. 1 cannister remains. Ambulating unit SBA. In chair most of shift. Auop into urinal. 2g Na diet- no appetite/no po intake today/refused breakfast/lunch.

## 2018-12-10 NOTE — PLAN OF CARE
D/I/A:  Neuro: A&O, walked in hallway x1. Denies pain  CV: 100% V paced rate 100-110s. BP MAPs labile, difficulty titrating nipride gtt to MAP goal due to inability to obtain accurate cuff BPs. Titrating gtt according to hemodynamics. Latest CO 4.9, CI 2.3, SVO2 49, CVP 8, SVR 900s. FICKs q4hrs. Will wean nipride gtt as tolerated once PO meds given. LVAD numbers WDL, Flows 4-4.4, PIs 3.3-3.6, Power 4.0-4.4. 4x chest tubes in place. Mediastinal chest tubes clotted, unable to strip clots, bleeding around chest tube site, changing dressing q4hrs.   Pulm: Room Air, Lungs coarse. Encouraged IS use, productive cough  GI: 2g Na diet, poor appetite.  : Voiding with urinal. 40mg lasix given x1  Gtt: Heparin, Nipride  P: Continue to monitor hemodynamics, titrate nipride gtt as able.

## 2018-12-10 NOTE — PLAN OF CARE
Discharge Planner OT   Patient plan for discharge: home  Current status: CGA to min A for sit>stand, ambulating approx 200 feet with close CGA and FWW, tolerating well but unsteady on feet, needing to return to room due to BM urge, declining further activity due to fatigue.  Barriers to return to prior living situation: decreased ADL independence and activity tolerance  Recommendations for discharge: TCU at this time  Rationale for recommendations: increase functional endurance and ADL independence at rehab       Entered by: Candido Hurtado 12/10/2018 3:33 PM

## 2018-12-10 NOTE — PROGRESS NOTES
Cardiology - Heart failure service    Interval events: Did well overnight. Given captopril at 4 am at 18.75. Still on nipride gtt at 0.5. Potassium repleted. No other concerns.      Temp:  [97.9  F (36.6  C)-98.8  F (37.1  C)] 97.9  F (36.6  C)  Heart Rate:  [] 102  Resp:  [8-42] 27  BP: ()/() 81/68  SpO2:  [87 %-100 %] 100 %  Tele: No events.    Hemodynamics:  CVP 12, PA 38/24 PAWP 15, CI 2.2, SVO2 47, SVR 1220  LVAD:  4.5, Speed: 5400, PI 3.1-3.5; no flow alarms or power spikes    I/O last 3 completed shifts:  In: 2760.47 [P.O.:1540; I.V.:1220.47]  Out: 2588 [Urine:2050; Chest Tube:538], 550 since midnight urine, 122 CT.     Hemodynamic drips:   Nipride 0.5.   Selected medications: aspirin 81, atrova 10, PPI. Captopril 25 due noon. Warfarin. Lasix 40 IV yesterday.     Physical examination:  Vitals:    12/05/18 0511 12/06/18 0200 12/07/18 0200 12/09/18 0627   Weight: 81.9 kg (180 lb 9.6 oz) 86.6 kg (190 lb 14.7 oz) 87.6 kg (193 lb 2 oz) 87.2 kg (192 lb 3.9 oz)    12/10/18 0000   Weight: 85.1 kg (187 lb 9.8 oz)     General: lying in bed, appear tired but comfortable  Resp: scattered rales, breathing comfortably  CV: regular, tachycardic, VAD hum, no JVD, incisions clean  Abdomen: Soft, Non-distended, Non-tender  Extremities: warm and well perfused, no edema    Labs were reviewed.  BMP  Recent Labs   Lab 12/10/18  0400 12/09/18  2200 12/09/18  1641 12/09/18  0757 12/09/18  0417 12/08/18  1504     --  140  --  145* 146*   POTASSIUM 4.0 3.1* 3.2* 4.0 3.6 3.5   CHLORIDE 108  --  106  --  109 111*   ASHLEE 8.0*  --  8.0*  --  8.4* 8.6   CO2 26  --  27  --  26 28   BUN 20  --  27  --  28 22   CR 0.68  --  0.78  --  0.84 0.81   *  --  150*  --  93 128*     LFTs  Recent Labs   Lab 12/07/18  0403 12/04/18  0800 12/03/18  1850   ALKPHOS 72 97 99   * 14 12   ALT 18 13 14   BILITOTAL 5.8* 2.0* 2.2*   PROTTOTAL 5.2* 6.8 7.1   ALBUMIN 3.1* 3.2* 3.2*      CBC  Recent Labs   Lab 12/10/18  0400  12/09/18  1641 12/09/18  0417 12/08/18  1504   WBC 6.8 7.3 7.5 8.1   RBC 2.69* 2.70* 2.86* 2.80*   HGB 8.1* 8.0* 8.6* 8.4*   HCT 25.4* 25.8* 27.9* 27.8*   MCV 94 96 98 99   MCH 30.1 29.6 30.1 30.0   MCHC 31.9 31.0* 30.8* 30.2*   RDW 18.3* 18.3* 18.4* 18.7*   * 94* 84* 76*     INR  Recent Labs   Lab 12/10/18  0400 12/09/18  0757 12/07/18  0403 12/06/18  1148   INR 1.32* 1.41* 2.05* 2.09*       Radiology     1.  Stable supportive lines and tubes.  2.  Cardiomegaly with decreased pulmonary edema.  3.  Stable small bilateral pleural effusion.        IMPRESSION & PLAN  Danielito Chu is a 63 year-old male with a PMHx s/f SHIRLEY, HTN, CHF (LVEF 15%), NIDDM2 who underwent HeartMate III LVAD placement on 12/5/18 with Dr. Silva.      #h/o HTN  #CHF s/p Heartmate III LVAD placement 12/5  - off pressors  - RV had mild dysfunction prior to LVAD. On nipride gtt right now. Started lisinopril 5 mg BID today.   - extubated 12/7  - on Aspirin 81mg  - on low intensity heparin drip, resumed warfarin for now  - speed 5400 RPM, tolerating well.  - fevers improved, cultures remain no growth  - lasix as needed.       Thank you for allowing me to care for this patient. This has been discussed with the attending physician.    Muna Meyer MD  Cardiology Fellow, PGY-5    I have reviewed today's vital signs, notes, medications, labs and imaging.  I have also seen and examined the patient and agree with the findings and plan as outlined above.  Pt sitting in chair without pain.  VSS with CVP 12, PA 42/20, CI 2.3 on nipride gtt. 2.7 L UO. Lungs clear and mechanical LVAD hum. Labs with Cr 0.68 and WBC 6.8 with IRN 1.32.  Assessment: Pt with endstage heart failure with LVAD implant with weaning of nipride and advancing oral afterload reducing agents.  Pt seen x2 with total critical care time 25 min.     Bennett Rene MD, PhD  Professor, Heart Failure and Cardiac Transplantation  [facility]

## 2018-12-11 ENCOUNTER — APPOINTMENT (OUTPATIENT)
Dept: CARDIOLOGY | Facility: CLINIC | Age: 63
DRG: 001 | End: 2018-12-11
Attending: INTERNAL MEDICINE
Payer: COMMERCIAL

## 2018-12-11 ENCOUNTER — APPOINTMENT (OUTPATIENT)
Dept: OCCUPATIONAL THERAPY | Facility: CLINIC | Age: 63
DRG: 001 | End: 2018-12-11
Attending: INTERNAL MEDICINE
Payer: COMMERCIAL

## 2018-12-11 ENCOUNTER — CARE COORDINATION (OUTPATIENT)
Dept: CARDIOLOGY | Facility: CLINIC | Age: 63
End: 2018-12-11

## 2018-12-11 DIAGNOSIS — Z95.811 LVAD (LEFT VENTRICULAR ASSIST DEVICE) PRESENT (H): Primary | ICD-10-CM

## 2018-12-11 LAB
ANION GAP SERPL CALCULATED.3IONS-SCNC: 6 MMOL/L (ref 3–14)
ANION GAP SERPL CALCULATED.3IONS-SCNC: 7 MMOL/L (ref 3–14)
BACTERIA SPEC CULT: NO GROWTH
BASE DEFICIT BLDV-SCNC: 0.5 MMOL/L
BASE DEFICIT BLDV-SCNC: 4.4 MMOL/L
BASE DEFICIT BLDV-SCNC: 4.5 MMOL/L
BASE EXCESS BLDV CALC-SCNC: 1.4 MMOL/L
BASE EXCESS BLDV CALC-SCNC: 2.1 MMOL/L
BUN SERPL-MCNC: 10 MG/DL (ref 7–30)
BUN SERPL-MCNC: 9 MG/DL (ref 7–30)
CALCIUM SERPL-MCNC: 7.1 MG/DL (ref 8.5–10.1)
CALCIUM SERPL-MCNC: 7.9 MG/DL (ref 8.5–10.1)
CHLORIDE SERPL-SCNC: 108 MMOL/L (ref 94–109)
CHLORIDE SERPL-SCNC: 111 MMOL/L (ref 94–109)
CO2 SERPL-SCNC: 23 MMOL/L (ref 20–32)
CO2 SERPL-SCNC: 24 MMOL/L (ref 20–32)
CREAT SERPL-MCNC: 0.47 MG/DL (ref 0.66–1.25)
CREAT SERPL-MCNC: 0.59 MG/DL (ref 0.66–1.25)
ERYTHROCYTE [DISTWIDTH] IN BLOOD BY AUTOMATED COUNT: 18.6 % (ref 10–15)
ERYTHROCYTE [DISTWIDTH] IN BLOOD BY AUTOMATED COUNT: 18.9 % (ref 10–15)
GFR SERPL CREATININE-BSD FRML MDRD: >90 ML/MIN/1.7M2
GFR SERPL CREATININE-BSD FRML MDRD: >90 ML/MIN/1.7M2
GLUCOSE BLDC GLUCOMTR-MCNC: 101 MG/DL (ref 70–99)
GLUCOSE BLDC GLUCOMTR-MCNC: 135 MG/DL (ref 70–99)
GLUCOSE BLDC GLUCOMTR-MCNC: 81 MG/DL (ref 70–99)
GLUCOSE BLDC GLUCOMTR-MCNC: 88 MG/DL (ref 70–99)
GLUCOSE SERPL-MCNC: 101 MG/DL (ref 70–99)
GLUCOSE SERPL-MCNC: 127 MG/DL (ref 70–99)
HCO3 BLDV-SCNC: 20 MMOL/L (ref 21–28)
HCO3 BLDV-SCNC: 20 MMOL/L (ref 21–28)
HCO3 BLDV-SCNC: 24 MMOL/L (ref 21–28)
HCO3 BLDV-SCNC: 26 MMOL/L (ref 21–28)
HCO3 BLDV-SCNC: 26 MMOL/L (ref 21–28)
HCT VFR BLD AUTO: 26.6 % (ref 40–53)
HCT VFR BLD AUTO: 27.3 % (ref 40–53)
HGB BLD-MCNC: 8.3 G/DL (ref 13.3–17.7)
HGB BLD-MCNC: 8.3 G/DL (ref 13.3–17.7)
INR PPP: 1.77 (ref 0.86–1.14)
LMWH PPP CHRO-ACNC: 0.21 IU/ML
MAGNESIUM SERPL-MCNC: 1.9 MG/DL (ref 1.6–2.3)
MCH RBC QN AUTO: 29.5 PG (ref 26.5–33)
MCH RBC QN AUTO: 30 PG (ref 26.5–33)
MCHC RBC AUTO-ENTMCNC: 30.4 G/DL (ref 31.5–36.5)
MCHC RBC AUTO-ENTMCNC: 31.2 G/DL (ref 31.5–36.5)
MCV RBC AUTO: 96 FL (ref 78–100)
MCV RBC AUTO: 97 FL (ref 78–100)
O2/TOTAL GAS SETTING VFR VENT: 21 %
OXYHGB MFR BLDV: 45 %
OXYHGB MFR BLDV: 47 %
OXYHGB MFR BLDV: 47 %
PCO2 BLDV: 31 MM HG (ref 40–50)
PCO2 BLDV: 32 MM HG (ref 40–50)
PCO2 BLDV: 37 MM HG (ref 40–50)
PCO2 BLDV: 38 MM HG (ref 40–50)
PCO2 BLDV: 39 MM HG (ref 40–50)
PH BLDV: 7.4 PH (ref 7.32–7.43)
PH BLDV: 7.41 PH (ref 7.32–7.43)
PH BLDV: 7.42 PH (ref 7.32–7.43)
PH BLDV: 7.44 PH (ref 7.32–7.43)
PH BLDV: 7.45 PH (ref 7.32–7.43)
PHOSPHATE SERPL-MCNC: 1.4 MG/DL (ref 2.5–4.5)
PHOSPHATE SERPL-MCNC: 1.8 MG/DL (ref 2.5–4.5)
PLATELET # BLD AUTO: 145 10E9/L (ref 150–450)
PLATELET # BLD AUTO: 174 10E9/L (ref 150–450)
PO2 BLDV: 26 MM HG (ref 25–47)
PO2 BLDV: 26 MM HG (ref 25–47)
PO2 BLDV: 27 MM HG (ref 25–47)
PO2 BLDV: 27 MM HG (ref 25–47)
PO2 BLDV: 28 MM HG (ref 25–47)
POTASSIUM SERPL-SCNC: 3.7 MMOL/L (ref 3.4–5.3)
POTASSIUM SERPL-SCNC: 3.8 MMOL/L (ref 3.4–5.3)
RBC # BLD AUTO: 2.77 10E12/L (ref 4.4–5.9)
RBC # BLD AUTO: 2.81 10E12/L (ref 4.4–5.9)
SODIUM SERPL-SCNC: 139 MMOL/L (ref 133–144)
SODIUM SERPL-SCNC: 140 MMOL/L (ref 133–144)
SPECIMEN SOURCE: NORMAL
WBC # BLD AUTO: 7.3 10E9/L (ref 4–11)
WBC # BLD AUTO: 9 10E9/L (ref 4–11)

## 2018-12-11 PROCEDURE — 00000146 ZZHCL STATISTIC GLUCOSE BY METER IP

## 2018-12-11 PROCEDURE — 25000125 ZZHC RX 250: Performed by: THORACIC SURGERY (CARDIOTHORACIC VASCULAR SURGERY)

## 2018-12-11 PROCEDURE — 85027 COMPLETE CBC AUTOMATED: CPT | Performed by: SURGERY

## 2018-12-11 PROCEDURE — 85610 PROTHROMBIN TIME: CPT | Performed by: SURGERY

## 2018-12-11 PROCEDURE — 25000132 ZZH RX MED GY IP 250 OP 250 PS 637: Performed by: THORACIC SURGERY (CARDIOTHORACIC VASCULAR SURGERY)

## 2018-12-11 PROCEDURE — 93321 DOPPLER ECHO F-UP/LMTD STD: CPT | Mod: 26 | Performed by: INTERNAL MEDICINE

## 2018-12-11 PROCEDURE — 40000196 ZZH STATISTIC RAPCV CVP MONITORING

## 2018-12-11 PROCEDURE — 21400006 ZZH R&B CCU INTERMEDIATE UMMC

## 2018-12-11 PROCEDURE — 25000128 H RX IP 250 OP 636: Performed by: SURGERY

## 2018-12-11 PROCEDURE — 40000048 ZZH STATISTIC DAILY SWAN MONITORING

## 2018-12-11 PROCEDURE — 83735 ASSAY OF MAGNESIUM: CPT | Performed by: SURGERY

## 2018-12-11 PROCEDURE — 40000275 ZZH STATISTIC RCP TIME EA 10 MIN

## 2018-12-11 PROCEDURE — 93308 TTE F-UP OR LMTD: CPT | Mod: 26 | Performed by: INTERNAL MEDICINE

## 2018-12-11 PROCEDURE — 85520 HEPARIN ASSAY: CPT | Performed by: SURGERY

## 2018-12-11 PROCEDURE — 99233 SBSQ HOSP IP/OBS HIGH 50: CPT | Mod: 25 | Performed by: INTERNAL MEDICINE

## 2018-12-11 PROCEDURE — 40000014 ZZH STATISTIC ARTERIAL MONITORING DAILY

## 2018-12-11 PROCEDURE — 25000128 H RX IP 250 OP 636: Performed by: THORACIC SURGERY (CARDIOTHORACIC VASCULAR SURGERY)

## 2018-12-11 PROCEDURE — 93325 DOPPLER ECHO COLOR FLOW MAPG: CPT | Mod: 26 | Performed by: INTERNAL MEDICINE

## 2018-12-11 PROCEDURE — 25000132 ZZH RX MED GY IP 250 OP 250 PS 637: Performed by: NURSE PRACTITIONER

## 2018-12-11 PROCEDURE — 97110 THERAPEUTIC EXERCISES: CPT | Mod: GO | Performed by: OCCUPATIONAL THERAPIST

## 2018-12-11 PROCEDURE — 83605 ASSAY OF LACTIC ACID: CPT | Performed by: NURSE PRACTITIONER

## 2018-12-11 PROCEDURE — 93308 TTE F-UP OR LMTD: CPT

## 2018-12-11 PROCEDURE — 82805 BLOOD GASES W/O2 SATURATION: CPT | Performed by: THORACIC SURGERY (CARDIOTHORACIC VASCULAR SURGERY)

## 2018-12-11 PROCEDURE — 25000132 ZZH RX MED GY IP 250 OP 250 PS 637: Performed by: SURGERY

## 2018-12-11 PROCEDURE — 84100 ASSAY OF PHOSPHORUS: CPT | Performed by: SURGERY

## 2018-12-11 PROCEDURE — 80048 BASIC METABOLIC PNL TOTAL CA: CPT | Performed by: SURGERY

## 2018-12-11 PROCEDURE — 25000132 ZZH RX MED GY IP 250 OP 250 PS 637: Performed by: INTERNAL MEDICINE

## 2018-12-11 PROCEDURE — 40000133 ZZH STATISTIC OT WARD VISIT: Performed by: OCCUPATIONAL THERAPIST

## 2018-12-11 RX ORDER — COLCHICINE 0.6 MG/1
0.6 TABLET ORAL DAILY
Status: DISCONTINUED | OUTPATIENT
Start: 2018-12-11 | End: 2018-12-13

## 2018-12-11 RX ORDER — WARFARIN SODIUM 3 MG/1
3 TABLET ORAL
Status: COMPLETED | OUTPATIENT
Start: 2018-12-11 | End: 2018-12-11

## 2018-12-11 RX ORDER — POTASSIUM CHLORIDE 750 MG/1
20 TABLET, EXTENDED RELEASE ORAL DAILY
Status: DISCONTINUED | OUTPATIENT
Start: 2018-12-12 | End: 2018-12-15

## 2018-12-11 RX ORDER — LANOLIN ALCOHOL/MO/W.PET/CERES
3 CREAM (GRAM) TOPICAL AT BEDTIME
Status: DISCONTINUED | OUTPATIENT
Start: 2018-12-11 | End: 2018-12-18 | Stop reason: HOSPADM

## 2018-12-11 RX ADMIN — OXYCODONE HYDROCHLORIDE 5 MG: 5 TABLET ORAL at 09:15

## 2018-12-11 RX ADMIN — LISINOPRIL 5 MG: 5 TABLET ORAL at 09:59

## 2018-12-11 RX ADMIN — OXYCODONE HYDROCHLORIDE 10 MG: 5 TABLET ORAL at 19:30

## 2018-12-11 RX ADMIN — POTASSIUM CHLORIDE 20 MEQ: 750 TABLET, EXTENDED RELEASE ORAL at 05:10

## 2018-12-11 RX ADMIN — WARFARIN SODIUM 3 MG: 3 TABLET ORAL at 18:10

## 2018-12-11 RX ADMIN — HEPARIN SODIUM 1700 UNITS/HR: 10000 INJECTION, SOLUTION INTRAVENOUS at 19:31

## 2018-12-11 RX ADMIN — SODIUM PHOSPHATE, MONOBASIC, MONOHYDRATE AND SODIUM PHOSPHATE, DIBASIC, ANHYDROUS 20 MMOL: 276; 142 INJECTION, SOLUTION INTRAVENOUS at 19:15

## 2018-12-11 RX ADMIN — ASPIRIN 81 MG CHEWABLE TABLET 81 MG: 81 TABLET CHEWABLE at 08:16

## 2018-12-11 RX ADMIN — POTASSIUM CHLORIDE 20 MEQ: 750 TABLET, EXTENDED RELEASE ORAL at 18:10

## 2018-12-11 RX ADMIN — SODIUM PHOSPHATE, MONOBASIC, MONOHYDRATE AND SODIUM PHOSPHATE, DIBASIC, ANHYDROUS 20 MMOL: 276; 142 INJECTION, SOLUTION INTRAVENOUS at 06:50

## 2018-12-11 RX ADMIN — LISINOPRIL 5 MG: 5 TABLET ORAL at 19:30

## 2018-12-11 RX ADMIN — COLCHICINE 0.6 MG: 0.6 TABLET, FILM COATED ORAL at 12:50

## 2018-12-11 RX ADMIN — HEPARIN SODIUM 1700 UNITS/HR: 10000 INJECTION, SOLUTION INTRAVENOUS at 06:20

## 2018-12-11 RX ADMIN — OXYCODONE HYDROCHLORIDE 10 MG: 5 TABLET ORAL at 15:53

## 2018-12-11 RX ADMIN — MAGNESIUM SULFATE 2 G: 2 INJECTION INTRAVENOUS at 06:07

## 2018-12-11 RX ADMIN — THIAMINE HCL TAB 100 MG 100 MG: 100 TAB at 08:16

## 2018-12-11 RX ADMIN — ATORVASTATIN CALCIUM 10 MG: 10 TABLET, FILM COATED ORAL at 08:19

## 2018-12-11 RX ADMIN — PANTOPRAZOLE SODIUM 40 MG: 40 TABLET, DELAYED RELEASE ORAL at 08:16

## 2018-12-11 RX ADMIN — OXYCODONE HYDROCHLORIDE 10 MG: 5 TABLET ORAL at 22:34

## 2018-12-11 ASSESSMENT — PAIN DESCRIPTION - DESCRIPTORS
DESCRIPTORS: SORE

## 2018-12-11 ASSESSMENT — ACTIVITIES OF DAILY LIVING (ADL)
ADLS_ACUITY_SCORE: 19

## 2018-12-11 ASSESSMENT — MIFFLIN-ST. JEOR: SCORE: 1678.25

## 2018-12-11 NOTE — PLAN OF CARE
Alert and oriented x4.  BP slightly on lower side with MAP 60-70s.  Mild tachycardic.  Hemodynamic parameters similar but SCVO2 remained low 45% the latest.  Continue heparin drip with Xa within therapeutic range.  Urine out adequate.  Chest drains yield minimal serosanguineous output.  Heartmate 3 LVAD numbers within normal range.  Afebrile.  Did not sleep that much at night.  Denied pain.

## 2018-12-11 NOTE — PROGRESS NOTES
CV ICU PROGRESS NOTE  December 11, 2018    CO-MORBIDITIES:   Nocturnal oxygen desaturation  (primary encounter diagnosis)  Acute on chronic systolic heart failure (H)    ASSESSMENT: Danielito Chu is a 63 year-old male with a PMHx s/f SHIRLEY, HTN, CHF (LVEF 15%), NIDDM2 who underwent LVAD placement on 12/5/18 with Dr. Silva. Extubated 12/7.    TODAY'S PROGRESS:   - bedside TTE to evaluate RV function  - start colchicine for likely gout flare  - diuresis per cards  - Pulmonary toilet  - continue dosing up warfarin while on heparin  - likely removal of PA cath this afternoon and TTF  - melatonin qhs    PLAN:   Neuro/ pain/ sedation:  - Monitor neurological status. Notify the MD for any acute changes in exam.  - Pain: oxy/Dilaudid/tylenol prn, Lidoderm.     Pulmonary care:   #SHIRLEY  - CPAP overnight as tolerated   #pHTN  - Aggressive pulmonary toilet given ateletasis     Cardiovascular:    #h/o HTN  #CHF s/p Heartmate III LVAD placement 12/5  - Monitor hemodynamic status.   - ASA 81mg  - Atorvastatin 10mg     GI care:   - Bowel regimen: Miralax, Senna-Colace       Fluids/ Electrolytes/ Nutrition:   - ICU electrolyte replacement protocol  - Nutrition consulted. Appreciate recs  - Regular diet, supplements     Renal/ Fluid Balance:    - Will monitor intake and output.  - diuresis per cards       Endocrine:  #NIDDM2    - SSI       ID/ Antibiotics:  - Completed course of periop anti-microbials     Heme:     - hgb stable       Prophylaxis:    - Mechanical prophylaxis for DVT.  - Low intensity heparin until therapeutic on coumadin  - Start coumadin on 12/9, INR goal 2-3       MSK:    - PT and OT consulted. Appreciate recs.  - colchicine for gout      Lines/ tubes/ drains:  - RIJ MAC with [place], likely remove swan  - CTx2 (left pleural, pericardial)  - PIVx1       Disposition:  - Likely TTF this afternoon once swan removed    Nayely Stiles MD  General Surgery, PGY-3  Pg  [phone]    ====================================    SUBJECTIVE:   No acute events overnight. Pain well controlled. Tolerating regular diet. Passing flatus and BM. Mediastinal chest tube site leaking serosanguinous drainage.     OBJECTIVE:   1. VITAL SIGNS:   Temp:  [97.2  F (36.2  C)-98.2  F (36.8  C)] 98.2  F (36.8  C)  Heart Rate:  [] 105  Resp:  [9-52] 20  BP: ()/() 112/52  SpO2:  [89 %-100 %] 100 %  Resp: 20      2. INTAKE/ OUTPUT:   I/O last 3 completed shifts:  In: 1742.9 [P.O.:1061; I.V.:681.9]  Out: 1422 [Urine:1250; Chest Tube:172]    3. PHYSICAL EXAMINATION:   General: sitting up in chair  Neuro: Alert and oriented x3, NAD  Resp: Non-labored breathing  CV: RRR  Abdomen: Soft, Non-distended, Non-tender  Incisions: c/d/i  Extremities: warm and well perfused    4. INVESTIGATIONS:   Arterial Blood Gases   Recent Labs   Lab 12/08/18  0953 12/07/18  1617 12/07/18  1020 12/07/18  0328   PH 7.40 7.38 7.38 7.43   PCO2 42 40 44 38   PO2 84 128* 102 92   HCO3 26 24 26 25     Complete Blood Count   Recent Labs   Lab 12/11/18  0350 12/10/18  1658 12/10/18  0400 12/09/18  1641   WBC 7.3 7.0 6.8 7.3   HGB 8.3* 8.1* 8.1* 8.0*   * 117* 102* 94*     Basic Metabolic Panel  Recent Labs   Lab 12/11/18  0350 12/10/18  1658 12/10/18  0400 12/09/18  2200 12/09/18  1641    144 142  --  140   POTASSIUM 3.8 3.2* 4.0 3.1* 3.2*   CHLORIDE 108 115* 108  --  106   CO2 24 22 26  --  27   BUN 10 13 20  --  27   CR 0.59* 0.56* 0.68  --  0.78   * 94 109*  --  150*     Liver Function Tests  Recent Labs   Lab 12/11/18  0350 12/10/18  0400 12/09/18  0757 12/07/18  0403   AST  --   --   --  117*   ALT  --   --   --  18   ALKPHOS  --   --   --  72   BILITOTAL  --   --   --  5.8*   ALBUMIN  --   --   --  3.1*   INR 1.77* 1.32* 1.41* 2.05*     Pancreatic Enzymes  No lab results found in last 7 days.  Coagulation Profile  Recent Labs   Lab 12/11/18  0350 12/10/18  0400 12/09/18  0757 12/07/18  0403   12/06/18  0336 12/05/18  1455 12/05/18  1220   INR 1.77* 1.32* 1.41* 2.05*   < > 1.81* 1.67* 2.03*   PTT  --   --   --  60*  --  38* 60* 35    < > = values in this interval not displayed.         5. RADIOLOGY:   Recent Results (from the past 24 hour(s))   XR Chest Port 1 View    Narrative    EXAM:  Chest radiograph, one view    INDICATION: Intubated, LVAD wrist on 12/5/2018     COMPARISON:  12/10/2018, 12/9/2018, CT 12/8/2018    FINDINGS:  AP view of the chest. Right IJ [place] catheter with tip in the  right main pulmonary artery. Right arm PICC with tip in the mid SVC.  AICD with single right ventricle lead in unchanged position. LVAD in  unchanged position. Cardiomediastinal silhouette is stably enlarged.  Pneumoperitoneum with air under the right hemidiaphragm. Prominent  pulmonary vasculature. Small bibasilar pleural effusion. Left  retrocardiac opacities. No visualized pneumothorax      Impression    IMPRESSION:  1. Presumed postsurgical pneumoperitoneum, likely unchanged from CT  exam performed on 12/8/2018.  2. Small bibasilar pleural effusion with associated left basilar  opacities, likely compressive atelectasis.  3. Pulmonary vascular congestion.  4. Support devices in stable position.    I have personally reviewed the examination and initial interpretation  and I agree with the findings.    COLLEEN SANDOVAL MD       =========================================

## 2018-12-11 NOTE — PLAN OF CARE
Discharge Planner OT   Patient plan for discharge: not discussed  Current status: New B LE gout pain today, increasing with standing. Tolerated 12 min LE ergometer and seated UE exercise well, although LEs painful.   Barriers to return to prior living situation: decreased ADL independence and activity tolerance  Recommendations for discharge: TCU  Rationale for recommendations: increase functional endurance and ADL independence at rehab       Entered by: Candido Hurtado 12/11/2018 1:49 PM

## 2018-12-11 NOTE — PROGRESS NOTES
LVAD Social Work Services Progress Note      Date of Initial Social Work Evaluation:   10/23/18    Collaborated with: Patient, Sig Other, and Sister    Data: Fabiano remains in ICU after LVAD, but progressing well. Reports he hopes to get out of the ICU soon and up to the regular floor.  Intervention: Met with the patient and family. Offered emotional support and inquired into questions/concerns. Briefly discussed discharge planning options to either the apartment or TCU/ARU.  Assessment: Patient reported it was not a good day due to gout pain. Sig Other and Sister engaged and friendly. Asked questions, but no concerns mentioned  Education provided by SW: Ongoing availability of SW  Plan:    Discharge Plans in Progress: ARU/TCU vs [place] apartment with 24-hour care    Barriers to d/c plan: Not medically stable yet    Follow up Plan: SW will continue to follow

## 2018-12-11 NOTE — PROGRESS NOTES
CLINICAL NUTRITION SERVICES - REASSESSMENT NOTE     Nutrition Prescription    RECOMMENDATIONS FOR MDs/PROVIDERS TO ORDER:  Consider nutrition support if PO intakes consistently >60% of needs (equivalent to ~1400 kcal and 100 g PRO) as pt has had poor PO intakes for almost 1 week post-op.  Consider oral phosphorus replacement if continues with low phos level.    Malnutrition Status:    Non-severe malnutrition in the context of acute illness     Recommendations already ordered by Registered Dietitian (RD):  Regular diet to encourage PO intake post-LVAD  Magic cup @ 10 am and Gelatein @ 2 pm, discontinue Boost Plus  Calorie counts x 3 days (12/12-12/14)    Future/Additional Recommendations:  If pt needs TFs, would rec place feeding tube (FT) and initiate TFs, Impact Peptide 1.5 (immune modulating TF formula, high protein). Initiate TFs at 10 mL/hr, advancing rate by 10 mL Q 8 hrs to goal rate of 65 mL/hr. This provides 1560 mL TF, 2340 kcals (29 kcal/kg/day), 147 g PRO (1.8 g/kg/day), 1201 ml free H2O, 100 g Fat (50% from MCTs), 218 g CHO and no fiber daily.                          If begin tube feeds:                         - Flush FT with 30 mL water Q 4 hrs for patency. Rec provider adjust free water flushes as needed, pending fluid status.                        - Ensure K+/Mg++/Phos labs are ordered daily until TFs advance to goal infusion to evaluate for sx of refeeding with nutrition received. If lytes trend low, aggressively replace lytes.                            - If not already ordered, order a multivitamin/mineral (certavite 15 mL/day via FT) to help ensure micronutrient needs being met with suspected hypermetabolic demands and potential interruptions to TF infusions.                        - Monitor TF and possible need to adjust nutrition support regimen if necessary, pending medical course and nutrition status.                            - If Impact Peptide 1.5 TF is started, order a baseline CRP lab  and then CRP labs for the subsequent two Mondays     EVALUATION OF THE PROGRESS TOWARD GOALS   Diet: Low Saturated Fat, <2400 mg sodium, Ensure Plus (Boost Plus) between meals    Intake: Poor appetite and PO intakes. Pt/family reporting eating 25-50% of ~ 1 meal per day since surgery. He did have some protein yesterday (chicken salad). Pt attributes this to not feeling very hungry and not liking the food in the hospital. Pt does not like Boost, but he is willing to try other ONS. Family is going to bring in some food for him today (chicken, fruit, and bread).      NEW FINDINGS   -CV: Post-LVAD on 12/5  -Skin: Lv 18.   -Labs: Phos 1.4 (L)    MALNUTRITION  % Intake: </= 50% for >/= 5 days (severe)  % Weight Loss: Weight loss does not meet criteria  Subcutaneous Fat Loss: None observed  Muscle Loss: generalized (per previous report), Temporal, Upper arm (bicep, tricep), Lower arm  (forearm) and Upper leg (quadricep, hamstring): mild  Fluid Accumulation/Edema: Does not meet criteria  Malnutrition Diagnosis: Non-severe malnutrition in the context of acute illness    Previous Goals   Diet adv v nutrition support within 2-3 days post-op.  Evaluation: Met    Total avg nutritional intake to meet a minimum of 25 kcal/kg and 1.5 g PRO/kg daily (per dosing wt 82 kg) once post-op.  Evaluation: Not met    Previous Nutrition Diagnosis  Predicted inadequate nutrient intake (protein-energy) related to upcoming surgery, unknown extubation and diet advancement timeframe.   Evaluation: Declining    CURRENT NUTRITION DIAGNOSIS  Inadequate oral intake related to poor appetite, food preferences and dislike of hospital foods as evidenced by PO intakes 25-50% of 1 meal daily since surgery      INTERVENTIONS  Implementation  Medical food supplement therapy - Adjusted ONS per pt preference  Education - encouraged protein intakes post-LVAD. Suggested sources, family bringing in some foods he likes  Adjusted to regular  diet    Goals  Patient to consume % of nutritionally adequate meal trays TID, or the equivalent with supplements/snacks    Monitoring/Evaluation  Progress toward goals will be monitored and evaluated per protocol.    Marisela Cantu RD, MISHA, [facility]  CVICU Dietitian  Pager: [phone]

## 2018-12-11 NOTE — PROGRESS NOTES
CVTS PROGRESS NOTE  December 11, 2018    CO-MORBIDITIES:   Nocturnal oxygen desaturation  (primary encounter diagnosis)  Acute on chronic systolic heart failure (H)    ASSESSMENT: Danielito Chu is a 63 year-old male with a PMHx s/f SHIRLEY, HTN, CHF (LVEF 15%), NIDDM2 who underwent LVAD placement on 12/5/18 with Dr. Silva. Extubated 12/7.    TODAY'S PROGRESS:   - bedside TTE to evaluate RV function  - start colchicine for likely gout flare  - diuresis per cards  - Pulmonary toilet  - continue dosing up warfarin while on heparin  - likely removal of PA cath this afternoon and TTF  - melatonin qhs    PLAN:   Neuro/ pain/ sedation:  - Monitor neurological status. Notify the MD for any acute changes in exam.  - Pain: oxy/Dilaudid/tylenol prn, Lidoderm.     Pulmonary care:   #SHIRLEY  - CPAP overnight as tolerated   #pHTN  - Aggressive pulmonary toilet given ateletasis     Cardiovascular:    #h/o HTN  #CHF s/p Heartmate III LVAD placement 12/5  - Monitor hemodynamic status.   - ASA 81mg  - Atorvastatin 10mg     GI care:   - Bowel regimen: Miralax, Senna-Colace       Fluids/ Electrolytes/ Nutrition:   - ICU electrolyte replacement protocol  - Nutrition consulted. Appreciate recs  - Regular diet, supplements     Renal/ Fluid Balance:    - Will monitor intake and output.  - diuresis per cards       Endocrine:  #NIDDM2    - SSI       ID/ Antibiotics:  - Completed course of periop anti-microbials     Heme:     - hgb stable       Prophylaxis:    - Mechanical prophylaxis for DVT.  - Low intensity heparin until therapeutic on coumadin  - Start coumadin on 12/9, INR goal 2-3       MSK:    - PT and OT consulted. Appreciate recs.  - colchicine for gout      Lines/ tubes/ drains:  - RIJ MAC with [place], likely remove swan  - CTx2 (left pleural, pericardial)  - PIVx1       Disposition:  - Likely TTF this afternoon once swan removed    Nayely Stiles MD  General Surgery, PGY-3  Pg  [phone]    ====================================    SUBJECTIVE:   No acute events overnight. Pain well controlled. Tolerating regular diet. Passing flatus and BM. Mediastinal chest tube site leaking serosanguinous drainage.     OBJECTIVE:   1. VITAL SIGNS:   Temp:  [97.2  F (36.2  C)-98.2  F (36.8  C)] 98.2  F (36.8  C)  Heart Rate:  [] 103  Resp:  [9-52] 23  BP: ()/() 108/94  SpO2:  [91 %-100 %] 97 %  Resp: 23      2. INTAKE/ OUTPUT:   I/O last 3 completed shifts:  In: 1742.9 [P.O.:1061; I.V.:681.9]  Out: 1422 [Urine:1250; Chest Tube:172]    3. PHYSICAL EXAMINATION:   General: sitting up in chair  Neuro: Alert and oriented x3, NAD  Resp: Non-labored breathing  CV: RRR  Abdomen: Soft, Non-distended, Non-tender  Incisions: c/d/i  Extremities: warm and well perfused    4. INVESTIGATIONS:   Arterial Blood Gases   Recent Labs   Lab 12/08/18  0953 12/07/18  1617 12/07/18  1020 12/07/18  0328   PH 7.40 7.38 7.38 7.43   PCO2 42 40 44 38   PO2 84 128* 102 92   HCO3 26 24 26 25     Complete Blood Count   Recent Labs   Lab 12/11/18  0350 12/10/18  1658 12/10/18  0400 12/09/18  1641   WBC 7.3 7.0 6.8 7.3   HGB 8.3* 8.1* 8.1* 8.0*   * 117* 102* 94*     Basic Metabolic Panel  Recent Labs   Lab 12/11/18  0350 12/10/18  1658 12/10/18  0400 12/09/18  2200 12/09/18  1641    144 142  --  140   POTASSIUM 3.8 3.2* 4.0 3.1* 3.2*   CHLORIDE 108 115* 108  --  106   CO2 24 22 26  --  27   BUN 10 13 20  --  27   CR 0.59* 0.56* 0.68  --  0.78   * 94 109*  --  150*     Liver Function Tests  Recent Labs   Lab 12/11/18  0350 12/10/18  0400 12/09/18  0757 12/07/18  0403   AST  --   --   --  117*   ALT  --   --   --  18   ALKPHOS  --   --   --  72   BILITOTAL  --   --   --  5.8*   ALBUMIN  --   --   --  3.1*   INR 1.77* 1.32* 1.41* 2.05*     Pancreatic Enzymes  No lab results found in last 7 days.  Coagulation Profile  Recent Labs   Lab 12/11/18  0350 12/10/18  0400 12/09/18  0757 12/07/18  0403   12/06/18  0336 12/05/18  1455 12/05/18  1220   INR 1.77* 1.32* 1.41* 2.05*   < > 1.81* 1.67* 2.03*   PTT  --   --   --  60*  --  38* 60* 35    < > = values in this interval not displayed.         5. RADIOLOGY:   No results found for this or any previous visit (from the past 24 hour(s)).    =========================================

## 2018-12-11 NOTE — PLAN OF CARE
Neuro: A&Ox4. Denies numbness and tingling.  CV: ST. Afebrile. CO 4.8 CI 2.4. SvO2 still 39 but patient was up to commode twice before waiting a half hour to do the MARILYNN. Maps stable.  Resp: RA. Lungs coarse in bases. Coughing up fair amount of sputum.  GI: Still no appetite. Diarrhea.  : Unable to assess urine output as he used copious amounts of tissues to wipe bottom.   Drips: Heparin Xa ok. No drip change.

## 2018-12-11 NOTE — PROGRESS NOTES
Cardiology - Heart failure service    Interval events: No events.     Temp:  [97.2  F (36.2  C)-98.1  F (36.7  C)] 97.9  F (36.6  C)  Heart Rate:  [] 105  Resp:  [9-52] 18  BP: ()/() 85/74  SpO2:  [89 %-100 %] 97 %  Tele: No events.    Hemodynamics:  CVP 13, PA 36/16, PAWP unable, CI 2.1, SVO2 45, SVR 1150  LVAD:  4.5, Speed: 5400, PI 3.1-3.5; no flow alarms or power spikes    I/O last 3 completed shifts:  In: 1742.9 [P.O.:1061; I.V.:681.9]  Out: 1422 [Urine:1250; Chest Tube:172]    Hemodynamic drips:   No gtts.   Selected medications: aspirin 81, atorva 10, PPI. lisinopril 5 mg BID. Warfarin. Lasix 40 IV yesterday.         Physical examination:  Vitals:    12/06/18 0200 12/07/18 0200 12/09/18 0627 12/10/18 0000   Weight: 86.6 kg (190 lb 14.7 oz) 87.6 kg (193 lb 2 oz) 87.2 kg (192 lb 3.9 oz) 85.1 kg (187 lb 9.8 oz)    12/11/18 0400   Weight: 87.2 kg (192 lb 3.9 oz)     General: lying in bed, appear tired but comfortable  Resp: scattered rales, breathing comfortably  CV: regular, tachycardic, VAD hum, no JVD, incisions clean  Abdomen: Soft, Non-distended, Non-tender  Extremities: warm and well perfused, no edema        Labs were reviewed.  BMP  Recent Labs   Lab 12/11/18  0350 12/10/18  1658 12/10/18  0400 12/09/18  2200 12/09/18  1641    144 142  --  140   POTASSIUM 3.8 3.2* 4.0 3.1* 3.2*   CHLORIDE 108 115* 108  --  106   ASHLEE 7.9* 6.7* 8.0*  --  8.0*   CO2 24 22 26  --  27   BUN 10 13 20  --  27   CR 0.59* 0.56* 0.68  --  0.78   * 94 109*  --  150*     LFTs  Recent Labs   Lab 12/07/18  0403 12/04/18  0800   ALKPHOS 72 97   * 14   ALT 18 13   BILITOTAL 5.8* 2.0*   PROTTOTAL 5.2* 6.8   ALBUMIN 3.1* 3.2*      CBC  Recent Labs   Lab 12/11/18  0350 12/10/18  1658 12/10/18  0400 12/09/18  1641   WBC 7.3 7.0 6.8 7.3   RBC 2.77* 2.72* 2.69* 2.70*   HGB 8.3* 8.1* 8.1* 8.0*   HCT 26.6* 25.9* 25.4* 25.8*   MCV 96 95 94 96   MCH 30.0 29.8 30.1 29.6   MCHC 31.2* 31.3* 31.9 31.0*   RDW 18.6*  18.5* 18.3* 18.3*   * 117* 102* 94*     INR  Recent Labs   Lab 12/11/18  0350 12/10/18  0400 12/09/18  0757 12/07/18  0403   INR 1.77* 1.32* 1.41* 2.05*       Radiology pending.         IMPRESSION & PLAN  Danielito Chu is a 63 year-old male with a PMHx s/f SHIRLEY, HTN, CHF (LVEF 15%), NIDDM2 who underwent HeartMate III LVAD placement on 12/5/18 with Dr. Silva.      #h/o HTN  #CHF s/p Heartmate III LVAD placement 12/5  - off pressors  - RV had mild dysfunction prior to LVAD. SCVO2s not great, 45-49. Repeat today. On lisinopril 5 mg BID today. SVR 1181. End organ perfusion good.   - CVP higher, yesterday 8-10, now 11-13. Holding on diuretics.   - gout medication  - echo shows good RV function, septum is midline.  - on Aspirin 81mg  - on low intensity heparin drip, on warfarin for now INR 1.77.   - speed 5400 RPM, tolerating well.  - fevers improved, cultures remain no growth     Thank you for allowing me to care for this patient. This has been discussed with the attending physician.    Muna Meyer MD  Cardiology Fellow, PGY-5    I have reviewed today's vital signs, notes, medications, labs and imaging.  I have also seen and examined the patient and agree with the findings and plan as outlined above.  Spouse at bedside. V sliding scale with CVP 13, PA 36/16, CI 2.1 and SVR 1181 with SvO2 of 45%. 1.4L UO. Lungs clear and mechanical LVAD hum. Labs with Cr .59 and INR 1.7 with WBC 7.3.  Assessment: Pt with endstage heart failure with LVAD placment now with mild RV dysfn.  Plan to remove SG catheter today.  Pt seen X2 for total critical care time 25 min.     Bennett Rene MD, PhD  Professor, Heart Failure and Cardiac Transplantation  [facility]

## 2018-12-12 ENCOUNTER — ANTICOAGULATION THERAPY VISIT (OUTPATIENT)
Dept: ANTICOAGULATION | Facility: CLINIC | Age: 63
End: 2018-12-12

## 2018-12-12 LAB
ANION GAP SERPL CALCULATED.3IONS-SCNC: 6 MMOL/L (ref 3–14)
BUN SERPL-MCNC: 7 MG/DL (ref 7–30)
CALCIUM SERPL-MCNC: 7.6 MG/DL (ref 8.5–10.1)
CHLORIDE SERPL-SCNC: 108 MMOL/L (ref 94–109)
CK SERPL-CCNC: 48 U/L (ref 30–300)
CO2 SERPL-SCNC: 23 MMOL/L (ref 20–32)
CREAT SERPL-MCNC: 0.52 MG/DL (ref 0.66–1.25)
ERYTHROCYTE [DISTWIDTH] IN BLOOD BY AUTOMATED COUNT: 20.5 % (ref 10–15)
GFR SERPL CREATININE-BSD FRML MDRD: >90 ML/MIN/1.7M2
GLUCOSE BLDC GLUCOMTR-MCNC: 116 MG/DL (ref 70–99)
GLUCOSE BLDC GLUCOMTR-MCNC: 116 MG/DL (ref 70–99)
GLUCOSE BLDC GLUCOMTR-MCNC: 136 MG/DL (ref 70–99)
GLUCOSE SERPL-MCNC: 96 MG/DL (ref 70–99)
HCT VFR BLD AUTO: 26.7 % (ref 40–53)
HGB BLD-MCNC: 7.9 G/DL (ref 13.3–17.7)
INR PPP: 2.8 (ref 0.86–1.14)
LACTATE BLD-SCNC: 1.3 MMOL/L (ref 0.7–2)
LMWH PPP CHRO-ACNC: 0.1 IU/ML
MAGNESIUM SERPL-MCNC: 1.8 MG/DL (ref 1.6–2.3)
MCH RBC QN AUTO: 29.8 PG (ref 26.5–33)
MCHC RBC AUTO-ENTMCNC: 29.6 G/DL (ref 31.5–36.5)
MCV RBC AUTO: 101 FL (ref 78–100)
PHOSPHATE SERPL-MCNC: 2.2 MG/DL (ref 2.5–4.5)
PLATELET # BLD AUTO: 131 10E9/L (ref 150–450)
POTASSIUM SERPL-SCNC: 4 MMOL/L (ref 3.4–5.3)
RBC # BLD AUTO: 2.65 10E12/L (ref 4.4–5.9)
SODIUM SERPL-SCNC: 137 MMOL/L (ref 133–144)
WBC # BLD AUTO: 9.4 10E9/L (ref 4–11)

## 2018-12-12 PROCEDURE — 21400006 ZZH R&B CCU INTERMEDIATE UMMC

## 2018-12-12 PROCEDURE — 25000132 ZZH RX MED GY IP 250 OP 250 PS 637: Performed by: NURSE PRACTITIONER

## 2018-12-12 PROCEDURE — 99232 SBSQ HOSP IP/OBS MODERATE 35: CPT | Mod: GC | Performed by: INTERNAL MEDICINE

## 2018-12-12 PROCEDURE — 80048 BASIC METABOLIC PNL TOTAL CA: CPT | Performed by: THORACIC SURGERY (CARDIOTHORACIC VASCULAR SURGERY)

## 2018-12-12 PROCEDURE — 85610 PROTHROMBIN TIME: CPT | Performed by: THORACIC SURGERY (CARDIOTHORACIC VASCULAR SURGERY)

## 2018-12-12 PROCEDURE — 25000132 ZZH RX MED GY IP 250 OP 250 PS 637: Performed by: SURGERY

## 2018-12-12 PROCEDURE — 25000125 ZZHC RX 250: Performed by: THORACIC SURGERY (CARDIOTHORACIC VASCULAR SURGERY)

## 2018-12-12 PROCEDURE — P9041 ALBUMIN (HUMAN),5%, 50ML: HCPCS | Performed by: PHYSICIAN ASSISTANT

## 2018-12-12 PROCEDURE — 25000128 H RX IP 250 OP 636: Performed by: THORACIC SURGERY (CARDIOTHORACIC VASCULAR SURGERY)

## 2018-12-12 PROCEDURE — 25000132 ZZH RX MED GY IP 250 OP 250 PS 637

## 2018-12-12 PROCEDURE — 82550 ASSAY OF CK (CPK): CPT | Performed by: THORACIC SURGERY (CARDIOTHORACIC VASCULAR SURGERY)

## 2018-12-12 PROCEDURE — 25000132 ZZH RX MED GY IP 250 OP 250 PS 637: Performed by: STUDENT IN AN ORGANIZED HEALTH CARE EDUCATION/TRAINING PROGRAM

## 2018-12-12 PROCEDURE — 25000128 H RX IP 250 OP 636: Performed by: SURGERY

## 2018-12-12 PROCEDURE — 85520 HEPARIN ASSAY: CPT | Performed by: THORACIC SURGERY (CARDIOTHORACIC VASCULAR SURGERY)

## 2018-12-12 PROCEDURE — 25000128 H RX IP 250 OP 636: Performed by: PHYSICIAN ASSISTANT

## 2018-12-12 PROCEDURE — 25000132 ZZH RX MED GY IP 250 OP 250 PS 637: Performed by: PHYSICIAN ASSISTANT

## 2018-12-12 PROCEDURE — 84100 ASSAY OF PHOSPHORUS: CPT | Performed by: THORACIC SURGERY (CARDIOTHORACIC VASCULAR SURGERY)

## 2018-12-12 PROCEDURE — 40000802 ZZH SITE CHECK

## 2018-12-12 PROCEDURE — 36592 COLLECT BLOOD FROM PICC: CPT | Performed by: THORACIC SURGERY (CARDIOTHORACIC VASCULAR SURGERY)

## 2018-12-12 PROCEDURE — 83735 ASSAY OF MAGNESIUM: CPT | Performed by: THORACIC SURGERY (CARDIOTHORACIC VASCULAR SURGERY)

## 2018-12-12 PROCEDURE — 25000132 ZZH RX MED GY IP 250 OP 250 PS 637: Performed by: INTERNAL MEDICINE

## 2018-12-12 PROCEDURE — 85027 COMPLETE CBC AUTOMATED: CPT | Performed by: THORACIC SURGERY (CARDIOTHORACIC VASCULAR SURGERY)

## 2018-12-12 PROCEDURE — 25000132 ZZH RX MED GY IP 250 OP 250 PS 637: Performed by: THORACIC SURGERY (CARDIOTHORACIC VASCULAR SURGERY)

## 2018-12-12 PROCEDURE — 00000146 ZZHCL STATISTIC GLUCOSE BY METER IP

## 2018-12-12 RX ORDER — WARFARIN SODIUM 1 MG/1
1 TABLET ORAL
Status: COMPLETED | OUTPATIENT
Start: 2018-12-12 | End: 2018-12-12

## 2018-12-12 RX ORDER — LISINOPRIL 2.5 MG/1
2.5 TABLET ORAL 2 TIMES DAILY
Status: DISCONTINUED | OUTPATIENT
Start: 2018-12-12 | End: 2018-12-12

## 2018-12-12 RX ORDER — ALBUMIN, HUMAN INJ 5% 5 %
12.5 SOLUTION INTRAVENOUS ONCE
Status: COMPLETED | OUTPATIENT
Start: 2018-12-12 | End: 2018-12-12

## 2018-12-12 RX ORDER — LISINOPRIL 2.5 MG/1
2.5 TABLET ORAL 2 TIMES DAILY
Status: DISCONTINUED | OUTPATIENT
Start: 2018-12-12 | End: 2018-12-14

## 2018-12-12 RX ORDER — MAGNESIUM SULFATE HEPTAHYDRATE 40 MG/ML
2 INJECTION, SOLUTION INTRAVENOUS ONCE
Status: COMPLETED | OUTPATIENT
Start: 2018-12-12 | End: 2018-12-12

## 2018-12-12 RX ORDER — ALLOPURINOL 100 MG/1
100 TABLET ORAL DAILY
Status: DISCONTINUED | OUTPATIENT
Start: 2018-12-12 | End: 2018-12-13

## 2018-12-12 RX ADMIN — OXYCODONE HYDROCHLORIDE 10 MG: 5 TABLET ORAL at 20:39

## 2018-12-12 RX ADMIN — POTASSIUM CHLORIDE 20 MEQ: 29.8 INJECTION, SOLUTION INTRAVENOUS at 18:45

## 2018-12-12 RX ADMIN — MELATONIN TAB 3 MG 3 MG: 3 TAB at 00:38

## 2018-12-12 RX ADMIN — Medication 0.3 MG: at 04:17

## 2018-12-12 RX ADMIN — LISINOPRIL 2.5 MG: 2.5 TABLET ORAL at 09:54

## 2018-12-12 RX ADMIN — MAGNESIUM SULFATE IN WATER 2 G: 40 INJECTION, SOLUTION INTRAVENOUS at 11:49

## 2018-12-12 RX ADMIN — COLCHICINE 0.6 MG: 0.6 TABLET, FILM COATED ORAL at 08:37

## 2018-12-12 RX ADMIN — TRAZODONE HYDROCHLORIDE 100 MG: 100 TABLET ORAL at 00:38

## 2018-12-12 RX ADMIN — ALLOPURINOL 100 MG: 100 TABLET ORAL at 12:43

## 2018-12-12 RX ADMIN — ATORVASTATIN CALCIUM 10 MG: 10 TABLET, FILM COATED ORAL at 08:41

## 2018-12-12 RX ADMIN — POTASSIUM CHLORIDE 20 MEQ: 750 TABLET, EXTENDED RELEASE ORAL at 08:42

## 2018-12-12 RX ADMIN — MAGNESIUM SULFATE 2 G: 2 INJECTION INTRAVENOUS at 10:02

## 2018-12-12 RX ADMIN — Medication 0.5 MG: at 08:47

## 2018-12-12 RX ADMIN — MELATONIN TAB 3 MG 3 MG: 3 TAB at 22:36

## 2018-12-12 RX ADMIN — SODIUM PHOSPHATE, MONOBASIC, MONOHYDRATE AND SODIUM PHOSPHATE, DIBASIC, ANHYDROUS 15 MMOL: 276; 142 INJECTION, SOLUTION INTRAVENOUS at 10:56

## 2018-12-12 RX ADMIN — PANTOPRAZOLE SODIUM 40 MG: 40 TABLET, DELAYED RELEASE ORAL at 08:41

## 2018-12-12 RX ADMIN — WARFARIN SODIUM 1 MG: 1 TABLET ORAL at 17:38

## 2018-12-12 RX ADMIN — Medication 0.5 MG: at 22:42

## 2018-12-12 RX ADMIN — ASPIRIN 81 MG CHEWABLE TABLET 81 MG: 81 TABLET CHEWABLE at 08:41

## 2018-12-12 RX ADMIN — Medication 0.5 MG: at 12:43

## 2018-12-12 RX ADMIN — TRAZODONE HYDROCHLORIDE 100 MG: 100 TABLET ORAL at 22:36

## 2018-12-12 RX ADMIN — SODIUM CHLORIDE 250 ML: 9 INJECTION, SOLUTION INTRAVENOUS at 15:52

## 2018-12-12 RX ADMIN — Medication 0.3 MG: at 00:44

## 2018-12-12 RX ADMIN — OXYCODONE HYDROCHLORIDE 10 MG: 5 TABLET ORAL at 02:55

## 2018-12-12 RX ADMIN — Medication 0.5 MG: at 17:44

## 2018-12-12 RX ADMIN — HEPARIN SODIUM 1700 UNITS/HR: 10000 INJECTION, SOLUTION INTRAVENOUS at 06:47

## 2018-12-12 RX ADMIN — OXYCODONE HYDROCHLORIDE 10 MG: 5 TABLET ORAL at 10:37

## 2018-12-12 RX ADMIN — ALBUMIN HUMAN 12.5 G: 0.05 INJECTION, SOLUTION INTRAVENOUS at 13:38

## 2018-12-12 RX ADMIN — THIAMINE HCL TAB 100 MG 100 MG: 100 TAB at 08:44

## 2018-12-12 ASSESSMENT — PAIN DESCRIPTION - DESCRIPTORS
DESCRIPTORS: ACHING;DISCOMFORT;CONSTANT
DESCRIPTORS: SORE
DESCRIPTORS: SORE;DISCOMFORT
DESCRIPTORS: ACHING;DISCOMFORT
DESCRIPTORS: ACHING;DISCOMFORT
DESCRIPTORS: DISCOMFORT;THROBBING
DESCRIPTORS: SORE;DISCOMFORT

## 2018-12-12 ASSESSMENT — ACTIVITIES OF DAILY LIVING (ADL)
ADLS_ACUITY_SCORE: 19
ADLS_ACUITY_SCORE: 20
ADLS_ACUITY_SCORE: 19
ADLS_ACUITY_SCORE: 20

## 2018-12-12 NOTE — PROGRESS NOTES
ADVANCED HEART FAILURE PROGRESS NOTE    SUBJECTIVE:  This morning he reports bothersome foot and knee pain from gout. Otherwise feels well from VAD, no chest discomfort or shortness of breath.    ROS otherwise negative.    OBJECTIVE:  Vital signs:  Temp: 98.3  F (36.8  C) Temp  Min: 97.7  F (36.5  C)  Max: 98.3  F (36.8  C)  Heart Rate: 113 Heart Rate  Min: 98  Max: 113  BP: 94/78 Systolic (24hrs), Av , Min:80 , Max:118   Diastolic (24hrs), Av, Min:52, Max:94    Resp: 24 Resp  Min: 13  Max: 30  SpO2: 96 % SpO2  Min: 95 %  Max: 100 %      HeartMate 3: 5400 RPM, 4.5 LPM, PI 2.5-3.5      Intake/Output Summary (Last 24 hours) at 2018 0715  Last data filed at 2018 0659  Gross per 24 hour   Intake 1784.72 ml   Output 1090 ml   Net 694.72 ml       Vitals:    18 0627 12/10/18 0000 18 0400   Weight: 87.2 kg (192 lb 3.9 oz) 85.1 kg (187 lb 9.8 oz) 87.2 kg (192 lb 3.9 oz)       Physical Exam:  General: lying in bed, appear tired but comfortable  Resp: scattered rales, breathing comfortably  CV: regular, tachycardic, VAD hum, no JVD, incisions clean  Abdomen: Soft, Non-distended, Non-tender  Extremities: warm and well perfused, no edema      Medications:    HEParin 1,700 Units/hr (18 0647)     BETA BLOCKER NOT PRESCRIBED       Warfarin Therapy Reminder         Current Facility-Administered Medications   Medication Dose Route Frequency     aspirin  81 mg Oral Daily     atorvastatin  10 mg Oral Daily     colchicine  0.6 mg Oral Daily     insulin aspart  1-7 Units Subcutaneous TID AC     insulin aspart  1-5 Units Subcutaneous At Bedtime     lidocaine  2 patch Transdermal Q24h    And     lidocaine   Transdermal Q24H    And     lidocaine   Transdermal Q8H     lisinopril  5 mg Oral BID     melatonin  3 mg Oral At Bedtime     pantoprazole  40 mg Oral QAM AC     polyethylene glycol  17 g Oral BID     potassium chloride  20 mEq Oral Daily     senna-docusate  2 tablet Oral BID     sodium chloride  (PF)  3 mL Intracatheter Q8H     vitamin B1  100 mg Oral Daily         Labs:  CMP  Recent Labs   Lab 12/12/18  0547 12/11/18  1613 12/11/18  0350  12/09/18  0757  12/07/18  0403    139 140   < >  --    < > 144   POTASSIUM 4.0 3.7 3.8   < > 4.0   < > 4.1   CHLORIDE 108 111* 108   < >  --    < > 110*   CO2 23 23 24   < >  --    < > 26   BUN 7 9 10   < >  --    < > 14   CR 0.52* 0.47* 0.59*   < >  --    < > 0.94   ASHLEE 7.6* 7.1* 7.9*   < >  --    < > 8.9   MAG  --   --  1.9  --  2.1   < > 2.7*   PHOS  --  1.8* 1.4*  --  2.6   < > 3.3   GLC 96 127* 101*   < >  --    < > 118*   ALKPHOS  --   --   --   --   --   --  72   BILITOTAL  --   --   --   --   --   --  5.8*   AST  --   --   --   --   --   --  117*   ALT  --   --   --   --   --   --  18    < > = values in this interval not displayed.     BLOOD GAS  Recent Labs   Lab 12/08/18  0953 12/07/18  1617   PH 7.40 7.38   PCO2 42 40   PO2 84 128*     CBC  Recent Labs   Lab 12/12/18  0547 12/11/18  1613   WBC 9.4 9.0   HGB 7.9* 8.3*   HCT 26.7* 27.3*   * 174     COAG  Recent Labs   Lab 12/11/18  0350 12/10/18  0400  12/07/18  0403  12/06/18  0336   INR 1.77* 1.32*   < > 2.05*   < > 1.81*   PTT  --   --   --  60*  --  38*    < > = values in this interval not displayed.       ASSESSMENT/PLAN:  Danielito Chu is a 63 year-old male with a PMHx s/f SHIRLEY, HTN, CHF (LVEF 15%), NIDDM2 who underwent HeartMate III LVAD placement on 12/5/18 with Dr. Silva.      #h/o HTN  #CHF s/p Heartmate III LVAD placement 12/5  - off pressors  - some low PIs this morning, holding diuretics and giving a little fluid today  - gout medication  - echo shows good RV function, septum is midline.  - on Aspirin 81mg  - INR therapeutic, on warfarin, stopping heparin drip  - speed 5400 RPM, tolerating well.  - lisinopril 2.5mg BID  - fevers improved, cultures remain no growth     Thank you for allowing me to care for this patient. This has been discussed with the attending physician.      Moisés  MD Moiz  Advanced Heart Failure Fellow  [phone]    I have reviewed today's vital signs, notes, medications, labs and imaging.  I have also seen and examined the patient and agree with the findings and plan as outlined above.  Pt with LVAD implant.  VSS with lungs clear and mechanical LVAD hum.  Labs as above.  Assessment: Pt with endstage heart failure with HM3 LVAD implantation now with stable hemodynamics.  Plan is to remove SG catheter and transfer to telemetry.     Bennett Rene MD, PhD  Professor, Heart Failure and Cardiac Transplantation  [facility]

## 2018-12-12 NOTE — PROGRESS NOTES
New referral rec'd for this pt who has an LVAD implanted.  He remains hospitalized.  [facility] will follow up post discharge.  Location for INRs to be established @ discharge, since pt is outside the [facility] area.  Pt placed on our hospitalized list  Yakov NAZARIO

## 2018-12-12 NOTE — PROGRESS NOTES
CVTS Daily Note  12/12/2018  Attending: Andrei Silva, *    S:   No overnight events. Some low MAPs this AM with PI down to 2.5   Pt seen at bedside resting comfortably.    Does complain of gout knee pain, otherwise no acute complaints.      Denies F/C/Sweats.  No CP, SOB, or calf pain.    Tolerating diet, + BM.  + Flatus.    Ambulated well with assistance.    Pain level tolerable. Plan as per Neuro section below.     O:   Vitals:    12/11/18 2200 12/11/18 2313 12/12/18 0741 12/12/18 0834   BP:  94/78 (!) 74/58 (!) 84/62   BP Location:  Left arm     Pulse:       Resp:  24 22    Temp:  98.3  F (36.8  C) 97.6  F (36.4  C)    TempSrc:  Oral Oral    SpO2: 96% 96% 99%    Weight:       Height:         Vitals:    12/09/18 0627 12/10/18 0000 12/11/18 0400   Weight: 87.2 kg (192 lb 3.9 oz) 85.1 kg (187 lb 9.8 oz) 87.2 kg (192 lb 3.9 oz)     Intake/Output Summary (Last 24 hours) at 12/12/2018 1007  Last data filed at 12/12/2018 1006  Gross per 24 hour   Intake 1791.72 ml   Output 1020 ml   Net 771.72 ml       MAPs: 66 - 89  Gen: AAO x 3, pleasant, NAD  CV: LVAD humming, no murmurs, rubs, or gallops.   Pulm: CTA, no rhonchi or wheezes  Abd: soft, non-tender, no guarding  Ext: left knee and ankle edema with tenderness  Incision: clean, dry, intact, no erythema  Chest Tube sites: dressings clean and dry, serosanguinous, no air leak, output 370 cc   Lines: driveline dressing clean and dry   LVAD: speed 5400, flow 4.5 - 4.7, PI 2.5 - 3.5, power 4 - 4.1       Labs:  [facility]  Recent Labs   Lab 12/12/18  0547 12/11/18  1613 12/11/18  0350 12/10/18  1658    139 140 144   POTASSIUM 4.0 3.7 3.8 3.2*   CHLORIDE 108 111* 108 115*   ASHLEE 7.6* 7.1* 7.9* 6.7*   CO2 23 23 24 22   BUN 7 9 10 13   CR 0.52* 0.47* 0.59* 0.56*   GLC 96 127* 101* 94     CBC  Recent Labs   Lab 12/12/18  0547 12/11/18  1613 12/11/18  0350 12/10/18  1658   WBC 9.4 9.0 7.3 7.0   RBC 2.65* 2.81* 2.77* 2.72*   HGB 7.9* 8.3* 8.3* 8.1*   HCT 26.7* 27.3* 26.6*  25.9*   * 97 96 95   MCH 29.8 29.5 30.0 29.8   MCHC 29.6* 30.4* 31.2* 31.3*   RDW 20.5* 18.9* 18.6* 18.5*   * 174 145* 117*     INR  Recent Labs   Lab 12/12/18  0547 12/11/18  0350 12/10/18  0400 12/09/18  0757   INR 2.80* 1.77* 1.32* 1.41*      Hepatic Panel   Lab Results   Component Value Date     12/07/2018     Lab Results   Component Value Date    ALT 18 12/07/2018     Lab Results   Component Value Date    ALBUMIN 3.1 12/07/2018     GLUCOSE:   Recent Labs   Lab 12/12/18  0547 12/11/18 2123 12/11/18  1613 12/11/18  1612 12/11/18  1232 12/11/18  0728 12/11/18  0350 12/10/18  2123 12/10/18  1658 12/10/18  1657  12/10/18  0400  12/09/18  1641   GLC 96  --  127*  --   --   --  101*  --  94  --   --  109*  --  150*   BGM  --  101*  --  135* 88 81  --  139*  --  108*   < >  --    < >  --     < > = values in this interval not displayed.       Imaging:  reviewed recent imaging      A/P:   Danielito Chu is a 63 year-old male with a PMHx s/f SHIRLEY, HTN, CHF (LVEF 15%), NIDDM2 who underwent LVAD placement on 12/5/18 with Dr. Silva. Extubated 12/7.    Neuro:   - Neuro intact  - Tylenol and oxycodone, lidocaine patches   - Sleep; melatonin     CV:   - NYHA class IV stage D, HFrEF 2/2 NICM (EF 15%), s/p ICD, HTN  - No arrhythmia, HD stable.  - ASA 81 mg, Lipitor  - HTN; lisinopril 2.5 mg BID     Pulm:   - Pulm toilet, IS, activity and deep breathing   - Supplemental O2 PRN to keep sats > 92%  - CPAP PRN at night for PTA SHIRLEY     ID:   - WBC WNL, afebrile, no signs or symptoms of infection     GI / FEN:  - Reg diet, bowel regimen  - Replace Phos per protocol.      Renal / :   - Creatinine WNL, adequate UOP.   - IV fluids 12/12: 250 cc albumin, 250 cc NS   - Diuresis currently held, monitor daily for needs     Heme:   - Hgb 7.9, Plts 131    Endo:   - DMT2, not needing insulin   - Gout flare; colchicine 0.6 mg daily and add allopurinol 10 mg daily     PPX:   - Protonix    Anticoagulation:   - Warfarin for  LVAD, goal 2-3. INR 2.80.      Dispo:   - 6C since 12/11   - Needs LVAD teaching      Discussed with CVTS Fellow   Staff surgeons have been informed of changes through both  verbal and written communication.      Derick Castellon PA-C  Cardiothoracic Surgery  Pager [phone]    10:07 AM   December 12, 2018

## 2018-12-12 NOTE — PLAN OF CARE
PT: Cancel, attempted to see pt 2x this morning however pt declining due to gout pain.  OT to try and see him this afternoon. Please continue to encourage getting up to chair.

## 2018-12-12 NOTE — PLAN OF CARE
Transfer  Transferred from:   Via:bed  Reason for transfer: Pt inappropriate for unit  Family: Aware of transfer  Belongings:Sent with pt  Chart:Sent with pt  Medications: Meds from bin sent with pt  Report called from: GUERO Concepcion

## 2018-12-12 NOTE — PROGRESS NOTES
Care Coordinator Progress Note    Admission Date/Time:  11/28/2018  Attending MD:  Andrei Silva    Data  Chart reviewed, discussed with interdisciplinary team.   Patient was admitted for:    Nocturnal oxygen desaturation  Acute on chronic systolic heart failure (H)  S/P LVAD placement on 12/5/2018    Coordination of Care and Referrals: This writer attempted to meet with patient to discuss discharge planning, pt sleeping soundly. Pt s/p LVAD placement. PT/OT currently recommending discharge to TCU and per PA, pt needs to complete LVAD teaching. Per PA, pt will need outpatient INR and warfarin monitoring arranged through the [facility] prior to discharge.     Intervention  Referral placed for [facility] (Ph: [phone]) for INR and Warfarin Monitoring (Goal= 2-3). Indication for Anticoagulation: LVAD. Dr. Lorena Watkins to follow.    Plan  Anticipated Discharge Date: TBD  Anticipated Discharge Plan: TBD  CC will continue to monitor patient's medical condition and progress towards discharge.  Marysol Do RN BSN  6C Unit Care Coordinator  Phone number: [phone]  Pager: [phone]

## 2018-12-12 NOTE — PLAN OF CARE
Alert and oriented x4.  MAP within goal range.  HR 100s sinus tachycardia.  Spontaneous breathing on room air with adequate SpO2.  Maintains on heparin drip at 1700 units/hour.  Urine output adequate.  Encourage oral food and fluid intake. Still will poor appetite.  C/o moderate to severe pain in lower extremities linus the left side, on prn oxycodone with mild effect.  Chest tube yield small amount of serosanguineous output.  Afebrile.

## 2018-12-12 NOTE — PROVIDER NOTIFICATION
15 bt VT noted via tele. Pt asymptomatic, LVAD and VS WNL.  CVTS PA Derick E. Aware, no new orders.  Will continue to monitor

## 2018-12-12 NOTE — PLAN OF CARE
D: LVAD  I/A: Pt AOx4. Neuro intact. Pt's feet extremely painful today, pain medication and medication for gout given. VSS. Room air. LVAD number stable, see flowsheet for numbers. Hemodynamics stable and swan nat and cordis discontinued. Afebrile. Heparin drip continued. Pt had BM today. Pt voiding, UO adequate although difficult to measure due to mixture with stool today. Pt up to chair with therapy, tolerated well.  P: Continue plan of care. Pt to go to 6C upon open bed.

## 2018-12-12 NOTE — PLAN OF CARE
"BP 94/78 (BP Location: Left arm)   Pulse 103   Temp 98.3  F (36.8  C) (Oral)   Resp 24   Ht 1.778 m (5' 10\")   Wt 87.2 kg (192 lb 3.9 oz)   SpO2 96%   BMI 27.58 kg/m      D: S/p LVAD HM3 12/5/18. Hx SHIRLEY, HTN, CHF EF15%, DM2, ICD, PPM.  I/A: Monitored vitals and assessed pt status. A&O x4. LVAD numbers WNL. VSS see flowsheet. ST -110 w/ 0-14 PVCs. RA. Reports BLE gout pain. Swelling noted, elevated and cold packs applied. PRN oxycodone 10mg given x1 and dilaudid 0.3mg given x2. Heparin gtt therapeutic at 1700 units/hr. x2 CTs to 1 canister -20 sxn. Dressings c/d/i. Upper sternal incision TABBY, ecchymotic. Preventative mepilex on coccyx. Sepsis protocol triggered, lactic 1.3. Voiding adequate amount in bedside urinal. Assist x1-2. Sleeping between cares.  P: Continue to monitor and follow POC. Notify CVTS with changes.        "

## 2018-12-13 ENCOUNTER — APPOINTMENT (OUTPATIENT)
Dept: GENERAL RADIOLOGY | Facility: CLINIC | Age: 63
DRG: 001 | End: 2018-12-13
Attending: PHYSICIAN ASSISTANT
Payer: COMMERCIAL

## 2018-12-13 ENCOUNTER — APPOINTMENT (OUTPATIENT)
Dept: OCCUPATIONAL THERAPY | Facility: CLINIC | Age: 63
DRG: 001 | End: 2018-12-13
Attending: INTERNAL MEDICINE
Payer: COMMERCIAL

## 2018-12-13 ENCOUNTER — APPOINTMENT (OUTPATIENT)
Dept: GENERAL RADIOLOGY | Facility: CLINIC | Age: 63
DRG: 001 | End: 2018-12-13
Attending: STUDENT IN AN ORGANIZED HEALTH CARE EDUCATION/TRAINING PROGRAM
Payer: COMMERCIAL

## 2018-12-13 ENCOUNTER — DOCUMENTATION ONLY (OUTPATIENT)
Dept: CARDIOLOGY | Facility: CLINIC | Age: 63
End: 2018-12-13

## 2018-12-13 DIAGNOSIS — Z95.811 LVAD (LEFT VENTRICULAR ASSIST DEVICE) PRESENT (H): Primary | ICD-10-CM

## 2018-12-13 LAB
ANION GAP SERPL CALCULATED.3IONS-SCNC: 8 MMOL/L (ref 3–14)
APPEARANCE FLD: NORMAL
BACTERIA SPEC CULT: NO GROWTH
BACTERIA SPEC CULT: NO GROWTH
BUN SERPL-MCNC: 8 MG/DL (ref 7–30)
CALCIUM SERPL-MCNC: 7.8 MG/DL (ref 8.5–10.1)
CHLORIDE SERPL-SCNC: 106 MMOL/L (ref 94–109)
CO2 SERPL-SCNC: 22 MMOL/L (ref 20–32)
COLOR FLD: YELLOW
CREAT SERPL-MCNC: 0.61 MG/DL (ref 0.66–1.25)
CRYSTALS SNV MICRO: ABNORMAL
ERYTHROCYTE [DISTWIDTH] IN BLOOD BY AUTOMATED COUNT: 20.8 % (ref 10–15)
GFR SERPL CREATININE-BSD FRML MDRD: >90 ML/MIN/1.7M2
GLUCOSE BLDC GLUCOMTR-MCNC: 108 MG/DL (ref 70–99)
GLUCOSE BLDC GLUCOMTR-MCNC: 138 MG/DL (ref 70–99)
GLUCOSE BLDC GLUCOMTR-MCNC: 145 MG/DL (ref 70–99)
GLUCOSE SERPL-MCNC: 96 MG/DL (ref 70–99)
GRAM STN SPEC: NORMAL
HCT VFR BLD AUTO: 27 % (ref 40–53)
HGB BLD-MCNC: 8.3 G/DL (ref 13.3–17.7)
INR PPP: 2.73 (ref 0.86–1.14)
Lab: NORMAL
Lab: NORMAL
MAGNESIUM SERPL-MCNC: 2.1 MG/DL (ref 1.6–2.3)
MCH RBC QN AUTO: 30.3 PG (ref 26.5–33)
MCHC RBC AUTO-ENTMCNC: 30.7 G/DL (ref 31.5–36.5)
MCV RBC AUTO: 99 FL (ref 78–100)
MONOS+MACROS NFR FLD MANUAL: 11 %
NEUTS BAND NFR FLD MANUAL: 89 %
PHOSPHATE SERPL-MCNC: 2.4 MG/DL (ref 2.5–4.5)
PLATELET # BLD AUTO: 262 10E9/L (ref 150–450)
POTASSIUM SERPL-SCNC: 4.3 MMOL/L (ref 3.4–5.3)
RBC # BLD AUTO: 2.74 10E12/L (ref 4.4–5.9)
SODIUM SERPL-SCNC: 136 MMOL/L (ref 133–144)
SPECIMEN SOURCE FLD: NORMAL
SPECIMEN SOURCE: NORMAL
WBC # BLD AUTO: 11.2 10E9/L (ref 4–11)
WBC # FLD AUTO: NORMAL /UL

## 2018-12-13 PROCEDURE — 25000132 ZZH RX MED GY IP 250 OP 250 PS 637: Performed by: STUDENT IN AN ORGANIZED HEALTH CARE EDUCATION/TRAINING PROGRAM

## 2018-12-13 PROCEDURE — 80048 BASIC METABOLIC PNL TOTAL CA: CPT | Performed by: THORACIC SURGERY (CARDIOTHORACIC VASCULAR SURGERY)

## 2018-12-13 PROCEDURE — 25000132 ZZH RX MED GY IP 250 OP 250 PS 637: Performed by: PHYSICIAN ASSISTANT

## 2018-12-13 PROCEDURE — 89051 BODY FLUID CELL COUNT: CPT | Performed by: INTERNAL MEDICINE

## 2018-12-13 PROCEDURE — 3E0U33Z INTRODUCTION OF ANTI-INFLAMMATORY INTO JOINTS, PERCUTANEOUS APPROACH: ICD-10-PCS | Performed by: INTERNAL MEDICINE

## 2018-12-13 PROCEDURE — 25000132 ZZH RX MED GY IP 250 OP 250 PS 637: Performed by: SURGERY

## 2018-12-13 PROCEDURE — 85027 COMPLETE CBC AUTOMATED: CPT | Performed by: PHYSICIAN ASSISTANT

## 2018-12-13 PROCEDURE — 25000132 ZZH RX MED GY IP 250 OP 250 PS 637: Performed by: HOSPITALIST

## 2018-12-13 PROCEDURE — 73630 X-RAY EXAM OF FOOT: CPT | Mod: 50

## 2018-12-13 PROCEDURE — 97110 THERAPEUTIC EXERCISES: CPT | Mod: GO | Performed by: OCCUPATIONAL THERAPIST

## 2018-12-13 PROCEDURE — 84100 ASSAY OF PHOSPHORUS: CPT | Performed by: THORACIC SURGERY (CARDIOTHORACIC VASCULAR SURGERY)

## 2018-12-13 PROCEDURE — 36592 COLLECT BLOOD FROM PICC: CPT | Performed by: THORACIC SURGERY (CARDIOTHORACIC VASCULAR SURGERY)

## 2018-12-13 PROCEDURE — 83735 ASSAY OF MAGNESIUM: CPT | Performed by: THORACIC SURGERY (CARDIOTHORACIC VASCULAR SURGERY)

## 2018-12-13 PROCEDURE — 40000133 ZZH STATISTIC OT WARD VISIT: Performed by: OCCUPATIONAL THERAPIST

## 2018-12-13 PROCEDURE — 25000132 ZZH RX MED GY IP 250 OP 250 PS 637: Performed by: THORACIC SURGERY (CARDIOTHORACIC VASCULAR SURGERY)

## 2018-12-13 PROCEDURE — 25000128 H RX IP 250 OP 636: Performed by: THORACIC SURGERY (CARDIOTHORACIC VASCULAR SURGERY)

## 2018-12-13 PROCEDURE — 71045 X-RAY EXAM CHEST 1 VIEW: CPT

## 2018-12-13 PROCEDURE — 25000128 H RX IP 250 OP 636: Performed by: STUDENT IN AN ORGANIZED HEALTH CARE EDUCATION/TRAINING PROGRAM

## 2018-12-13 PROCEDURE — 25000132 ZZH RX MED GY IP 250 OP 250 PS 637: Performed by: NURSE PRACTITIONER

## 2018-12-13 PROCEDURE — 97530 THERAPEUTIC ACTIVITIES: CPT | Mod: GO | Performed by: OCCUPATIONAL THERAPIST

## 2018-12-13 PROCEDURE — 87205 SMEAR GRAM STAIN: CPT | Performed by: INTERNAL MEDICINE

## 2018-12-13 PROCEDURE — 40000986 XR CHEST PORT 1 VW

## 2018-12-13 PROCEDURE — 25000128 H RX IP 250 OP 636: Performed by: SURGERY

## 2018-12-13 PROCEDURE — 25000132 ZZH RX MED GY IP 250 OP 250 PS 637

## 2018-12-13 PROCEDURE — 40000802 ZZH SITE CHECK

## 2018-12-13 PROCEDURE — 89060 EXAM SYNOVIAL FLUID CRYSTALS: CPT | Performed by: INTERNAL MEDICINE

## 2018-12-13 PROCEDURE — 25000128 H RX IP 250 OP 636: Performed by: PHYSICIAN ASSISTANT

## 2018-12-13 PROCEDURE — 99232 SBSQ HOSP IP/OBS MODERATE 35: CPT | Mod: GC | Performed by: INTERNAL MEDICINE

## 2018-12-13 PROCEDURE — 21400006 ZZH R&B CCU INTERMEDIATE UMMC

## 2018-12-13 PROCEDURE — 0S9D3ZX DRAINAGE OF LEFT KNEE JOINT, PERCUTANEOUS APPROACH, DIAGNOSTIC: ICD-10-PCS | Performed by: INTERNAL MEDICINE

## 2018-12-13 PROCEDURE — 3E0U3BZ INTRODUCTION OF ANESTHETIC AGENT INTO JOINTS, PERCUTANEOUS APPROACH: ICD-10-PCS | Performed by: INTERNAL MEDICINE

## 2018-12-13 PROCEDURE — 25000125 ZZHC RX 250: Performed by: THORACIC SURGERY (CARDIOTHORACIC VASCULAR SURGERY)

## 2018-12-13 PROCEDURE — 87070 CULTURE OTHR SPECIMN AEROBIC: CPT | Performed by: INTERNAL MEDICINE

## 2018-12-13 PROCEDURE — 25000125 ZZHC RX 250

## 2018-12-13 PROCEDURE — 25000132 ZZH RX MED GY IP 250 OP 250 PS 637: Performed by: INTERNAL MEDICINE

## 2018-12-13 PROCEDURE — 85610 PROTHROMBIN TIME: CPT | Performed by: THORACIC SURGERY (CARDIOTHORACIC VASCULAR SURGERY)

## 2018-12-13 PROCEDURE — 00000146 ZZHCL STATISTIC GLUCOSE BY METER IP

## 2018-12-13 RX ORDER — ACETAMINOPHEN 325 MG/1
650 TABLET ORAL EVERY 6 HOURS PRN
Status: DISCONTINUED | OUTPATIENT
Start: 2018-12-13 | End: 2018-12-18 | Stop reason: HOSPADM

## 2018-12-13 RX ORDER — ALLOPURINOL 100 MG/1
200 TABLET ORAL DAILY
Status: DISCONTINUED | OUTPATIENT
Start: 2018-12-13 | End: 2018-12-18 | Stop reason: HOSPADM

## 2018-12-13 RX ORDER — OXYCODONE HYDROCHLORIDE 5 MG/1
5-10 TABLET ORAL EVERY 4 HOURS PRN
Status: DISCONTINUED | OUTPATIENT
Start: 2018-12-13 | End: 2018-12-18 | Stop reason: HOSPADM

## 2018-12-13 RX ORDER — TRIAMCINOLONE ACETONIDE 40 MG/ML
40 INJECTION, SUSPENSION INTRA-ARTICULAR; INTRAMUSCULAR ONCE
Status: COMPLETED | OUTPATIENT
Start: 2018-12-13 | End: 2018-12-13

## 2018-12-13 RX ORDER — WARFARIN SODIUM 2 MG/1
2 TABLET ORAL
Status: COMPLETED | OUTPATIENT
Start: 2018-12-13 | End: 2018-12-13

## 2018-12-13 RX ORDER — COLCHICINE 0.6 MG/1
0.6 TABLET ORAL 2 TIMES DAILY
Status: DISCONTINUED | OUTPATIENT
Start: 2018-12-13 | End: 2018-12-14

## 2018-12-13 RX ORDER — COLCHICINE 0.6 MG/1
1.2 TABLET ORAL ONCE
Status: COMPLETED | OUTPATIENT
Start: 2018-12-13 | End: 2018-12-13

## 2018-12-13 RX ORDER — LIDOCAINE HYDROCHLORIDE 10 MG/ML
INJECTION, SOLUTION EPIDURAL; INFILTRATION; INTRACAUDAL; PERINEURAL
Status: COMPLETED
Start: 2018-12-13 | End: 2018-12-13

## 2018-12-13 RX ADMIN — COLCHICINE 0.6 MG: 0.6 TABLET, FILM COATED ORAL at 19:25

## 2018-12-13 RX ADMIN — ASPIRIN 81 MG CHEWABLE TABLET 81 MG: 81 TABLET CHEWABLE at 09:15

## 2018-12-13 RX ADMIN — OXYCODONE HYDROCHLORIDE 10 MG: 5 TABLET ORAL at 11:54

## 2018-12-13 RX ADMIN — Medication 0.5 MG: at 01:57

## 2018-12-13 RX ADMIN — PANTOPRAZOLE SODIUM 40 MG: 40 TABLET, DELAYED RELEASE ORAL at 09:15

## 2018-12-13 RX ADMIN — COLCHICINE 1.2 MG: 0.6 TABLET, FILM COATED ORAL at 10:01

## 2018-12-13 RX ADMIN — WARFARIN SODIUM 2 MG: 2 TABLET ORAL at 17:28

## 2018-12-13 RX ADMIN — ALLOPURINOL 200 MG: 100 TABLET ORAL at 16:50

## 2018-12-13 RX ADMIN — ACETAMINOPHEN 650 MG: 325 SOLUTION ORAL at 11:54

## 2018-12-13 RX ADMIN — OXYCODONE HYDROCHLORIDE 10 MG: 5 TABLET ORAL at 00:30

## 2018-12-13 RX ADMIN — LIDOCAINE HYDROCHLORIDE: 10 INJECTION, SOLUTION EPIDURAL; INFILTRATION; INTRACAUDAL; PERINEURAL at 16:32

## 2018-12-13 RX ADMIN — MELATONIN TAB 3 MG 3 MG: 3 TAB at 22:22

## 2018-12-13 RX ADMIN — SODIUM CHLORIDE, POTASSIUM CHLORIDE, SODIUM LACTATE AND CALCIUM CHLORIDE 250 ML: 600; 310; 30; 20 INJECTION, SOLUTION INTRAVENOUS at 00:20

## 2018-12-13 RX ADMIN — ANAKINRA 100 MG: 100 INJECTION, SOLUTION SUBCUTANEOUS at 16:33

## 2018-12-13 RX ADMIN — POTASSIUM CHLORIDE 20 MEQ: 750 TABLET, EXTENDED RELEASE ORAL at 09:15

## 2018-12-13 RX ADMIN — Medication 0.5 MG: at 09:44

## 2018-12-13 RX ADMIN — TRIAMCINOLONE ACETONIDE 40 MG: 40 INJECTION, SUSPENSION INTRA-ARTICULAR; INTRAMUSCULAR at 16:30

## 2018-12-13 RX ADMIN — THIAMINE HCL TAB 100 MG 100 MG: 100 TAB at 09:16

## 2018-12-13 RX ADMIN — LISINOPRIL 2.5 MG: 2.5 TABLET ORAL at 09:16

## 2018-12-13 RX ADMIN — OXYCODONE HYDROCHLORIDE 10 MG: 5 TABLET ORAL at 04:11

## 2018-12-13 RX ADMIN — SODIUM PHOSPHATE, MONOBASIC, MONOHYDRATE AND SODIUM PHOSPHATE, DIBASIC, ANHYDROUS 15 MMOL: 276; 142 INJECTION, SOLUTION INTRAVENOUS at 13:11

## 2018-12-13 RX ADMIN — OXYCODONE HYDROCHLORIDE 10 MG: 5 TABLET ORAL at 17:26

## 2018-12-13 RX ADMIN — ACETAMINOPHEN 650 MG: 325 SOLUTION ORAL at 17:27

## 2018-12-13 RX ADMIN — ALTEPLASE 1 MG: 2.2 INJECTION, POWDER, LYOPHILIZED, FOR SOLUTION INTRAVENOUS at 12:56

## 2018-12-13 RX ADMIN — LISINOPRIL 2.5 MG: 2.5 TABLET ORAL at 19:25

## 2018-12-13 ASSESSMENT — ACTIVITIES OF DAILY LIVING (ADL)
ADLS_ACUITY_SCORE: 19

## 2018-12-13 ASSESSMENT — PAIN DESCRIPTION - DESCRIPTORS
DESCRIPTORS: ACHING;SORE
DESCRIPTORS: ACHING
DESCRIPTORS: ACHING

## 2018-12-13 NOTE — PLAN OF CARE
Discharge Planner OT   Patient plan for discharge: not discussed  Current status: Pt required max encouragement for all activity due to c/o severe pain from gout flare - team aware. Pt eventually transferred up to EOB with max A of 2 and increased time; declined to stand due to pain but did complete BUE exercises for aerobic endurance while seated EOB.  Barriers to return to prior living situation: pain, surgical precautions, impaired balance, weakness  Recommendations for discharge: currently TCU, however pending improved pain control/gout management pt may be able to discharge home with therapy by the time he is medically appropriate  Rationale for recommendations: to increase pt's (I) and strength for ADL/IADLs       Entered by: Yvette Vora 12/13/2018 1:22 PM

## 2018-12-13 NOTE — PROGRESS NOTES
ADVANCED HEART FAILURE PROGRESS NOTE    SUBJECTIVE:  No acute overnight events. Doing reasonably well today but still complaining of gout pain in feet and knee which makes walking difficult.    ROS otherwise negative.    OBJECTIVE:  Vital signs:  Temp: 98.3  F (36.8  C) Temp  Min: 97.7  F (36.5  C)  Max: 98.6  F (37  C)  Heart Rate: 108 Heart Rate  Min: 97  Max: 113  BP: (!) 80/62 Systolic (24hrs), Av , Min:74 , Max:91   Diastolic (24hrs), Av, Min:58, Max:77    Resp: 18 Resp  Min: 18  Max: 22  SpO2: 97 % SpO2  Min: 95 %  Max: 98 %      HeartMate 3: 5400 RPM, Flow 4.5 LPM, PI 3-3.2, Power 4 ureña      Intake/Output Summary (Last 24 hours) at 2018 0752  Last data filed at 2018 0717  Gross per 24 hour   Intake 1189.15 ml   Output 825 ml   Net 364.15 ml       Vitals:    18 0627 12/10/18 0000 18 0400   Weight: 87.2 kg (192 lb 3.9 oz) 85.1 kg (187 lb 9.8 oz) 87.2 kg (192 lb 3.9 oz)         Physical Exam:  General: lying in bed, appear tired but comfortable  Resp: scattered rales, breathing comfortably  CV: regular, tachycardic, VAD hum, no JVD, incisions clean  Abdomen: Soft, Non-distended, Non-tender  Extremities: warm and well perfused, no edema         Medications:    BETA BLOCKER NOT PRESCRIBED       Warfarin Therapy Reminder         Current Facility-Administered Medications   Medication Dose Route Frequency     allopurinol  100 mg Oral Daily     aspirin  81 mg Oral Daily     atorvastatin  10 mg Oral Daily     colchicine  0.6 mg Oral Daily     insulin aspart  1-7 Units Subcutaneous TID AC     insulin aspart  1-5 Units Subcutaneous At Bedtime     lidocaine  2 patch Transdermal Q24h    And     lidocaine   Transdermal Q24H    And     lidocaine   Transdermal Q8H     lisinopril  2.5 mg Oral BID     melatonin  3 mg Oral At Bedtime     pantoprazole  40 mg Oral QAM AC     polyethylene glycol  17 g Oral BID     potassium chloride  20 mEq Oral Daily     senna-docusate  2 tablet Oral BID      sodium chloride (PF)  3 mL Intracatheter Q8H     vitamin B1  100 mg Oral Daily         Labs:  CMP  Recent Labs   Lab 12/13/18  0649 12/12/18  0547  12/07/18  0403    137   < > 144   POTASSIUM 4.3 4.0   < > 4.1   CHLORIDE 106 108   < > 110*   CO2 22 23   < > 26   BUN 8 7   < > 14   CR 0.61* 0.52*   < > 0.94   ASHLEE 7.8* 7.6*   < > 8.9   MAG 2.1 1.8   < > 2.7*   PHOS 2.4* 2.2*   < > 3.3   GLC 96 96   < > 118*   ALKPHOS  --   --   --  72   BILITOTAL  --   --   --  5.8*   AST  --   --   --  117*   ALT  --   --   --  18    < > = values in this interval not displayed.     BLOOD GAS  Recent Labs   Lab 12/08/18  0953 12/07/18  1617   PH 7.40 7.38   PCO2 42 40   PO2 84 128*     CBC  Recent Labs   Lab 12/13/18  0649 12/12/18  0547   WBC 11.2* 9.4   HGB 8.3* 7.9*   HCT 27.0* 26.7*    131*     COAG  Recent Labs   Lab 12/13/18  0649 12/12/18  0547  12/07/18  0403   INR 2.73* 2.80*   < > 2.05*   PTT  --   --   --  60*    < > = values in this interval not displayed.         ASSESSMENT/PLAN:  Danielito Chu is a 63 year-old male with a PMHx s/f SHIRLEY, HTN, CHF (LVEF 15%), NIDDM2 who underwent HeartMate III LVAD placement on 12/5/18 with Dr. Silva.      #h/o HTN  #CHF s/p Heartmate III LVAD placement 12/5  - off pressors  - some low PIs this morning, holding diuretics and giving a little fluid today  - agree with rheumatology consult for assistance with gout magagement  - echo shows good RV function, septum is midline.  - on Aspirin 81mg  - INR therapeutic, on warfarin  - speed 5400 RPM, tolerating well.  - lisinopril 2.5mg BID  - fevers improved, cultures remain no growth     Thank you for allowing me to care for this patient. This has been discussed with the attending physician.      Moisés Rockwell MD  Advanced Heart Failure Fellow  [phone]    I have reviewed today's vital signs, notes, medications, labs and imaging.  I have also seen and examined the patient and agree with the findings and plan as outlined above.  Pt  with spouse at bedside.  Complains of gout pain.  VSS with lungs clear and mechanical LVAD hum.  Labs WNL.  Assessment: Pt with endstage heart failure supported by LVAD with gout despite cholchicine therapy.  Will follow rheumatology advice regarding therapy.     Bennett Rene MD, PhD  Professor, Heart Failure and Cardiac Transplantation  [facility]

## 2018-12-13 NOTE — PROGRESS NOTES
CVTS Daily Note  12/13/2018  Attending: Andrei Silva, *    S:   No overnight events.   Pt seen at bedside resting comfortably.    Does complain of Gout flare and pain, otherwise no acute complaints.      Denies F/C/Sweats.  No CP, SOB, or calf pain.    Tolerating diet.  + BM.  + Flatus.    Ambulated well with heavy assistance due to gout joint pain.   Pain level tolerable. Plan as per Neuro section below.     O:   Vitals:    12/12/18 2023 12/13/18 0022 12/13/18 0421 12/13/18 0700   BP: (!) 74/60 (!) 83/73 (!) 88/76 (!) 80/62   BP Location: Left arm Left arm Left arm Left arm   Pulse:       Resp:  20 18   Temp:   98.3  F (36.8  C) 98.3  F (36.8  C)   TempSrc:   Oral Oral   SpO2:  95%  97%   Weight:       Height:         Vitals:    12/09/18 0627 12/10/18 0000 12/11/18 0400   Weight: 87.2 kg (192 lb 3.9 oz) 85.1 kg (187 lb 9.8 oz) 87.2 kg (192 lb 3.9 oz)     + 5 kg since admit and trending up x 1 day       Intake/Output Summary (Last 24 hours) at 12/13/2018 0806  Last data filed at 12/13/2018 0717  Gross per 24 hour   Intake 1069.15 ml   Output 825 ml   Net 244.15 ml       MAPs: 67 - 80  Gen: AAO x 3, pleasant, NAD  CV: LVAD humming, no murmurs, rubs, or gallops.   Pulm: CTA, no rhonchi or wheezes  Abd: soft, non-tender, no guarding  Ext: trace to moderate peripheral edema, 1 + pitting  Incision: clean, dry, intact, no erythema  Chest Tube sites: dressings clean and dry, serosanguinous, no air leak    * removed Pleural tube without incident   Lines: driveline dressing clean and dry   LVAD: speed 5400, flow 4.5, PI 2.7 - 3.2, power 4 - 4.1.       Labs:  [facility]  Recent Labs   Lab 12/13/18  0649 12/12/18  0547 12/11/18  1613 12/11/18  0350    137 139 140   POTASSIUM 4.3 4.0 3.7 3.8   CHLORIDE 106 108 111* 108   ASHLEE 7.8* 7.6* 7.1* 7.9*   CO2 22 23 23 24   BUN 8 7 9 10   CR 0.61* 0.52* 0.47* 0.59*   GLC 96 96 127* 101*     CBC  Recent Labs   Lab 12/13/18  0649 12/12/18  0547 12/11/18  1613 12/11/18  0350   WBC  11.2* 9.4 9.0 7.3   RBC 2.74* 2.65* 2.81* 2.77*   HGB 8.3* 7.9* 8.3* 8.3*   HCT 27.0* 26.7* 27.3* 26.6*   MCV 99 101* 97 96   MCH 30.3 29.8 29.5 30.0   MCHC 30.7* 29.6* 30.4* 31.2*   RDW 20.8* 20.5* 18.9* 18.6*    131* 174 145*     INR  Recent Labs   Lab 12/13/18  0649 12/12/18  0547 12/11/18  0350 12/10/18  0400   INR 2.73* 2.80* 1.77* 1.32*      Hepatic Panel   Lab Results   Component Value Date     12/07/2018     Lab Results   Component Value Date    ALT 18 12/07/2018     Lab Results   Component Value Date    ALBUMIN 3.1 12/07/2018     GLUCOSE:   Recent Labs   Lab 12/13/18  0649 12/12/18  2231 12/12/18  1737 12/12/18  1110 12/12/18  0547 12/11/18  2123 12/11/18  1613 12/11/18  1612 12/11/18  1232  12/11/18  0350  12/10/18  1658  12/10/18  0400   GLC 96  --   --   --  96  --  127*  --   --   --  101*  --  94  --  109*   BGM  --  116* 116* 136*  --  101*  --  135* 88   < >  --    < >  --    < >  --     < > = values in this interval not displayed.        Imaging:   reviewed recent imaging      A/P:   Danielito Chu is a 63 year-old male with a PMHx s/f SHIRLEY, HTN, CHF (LVEF 15%), NIDDM2 who underwent LVAD placement on 12/5/18 with Dr. Silva. Extubated 12/7.     Neuro:   - Neuro intact  - Tylenol and oxycodone, lidocaine patches   - Sleep; melatonin      CV:   - NYHA class IV stage D, HFrEF 2/2 NICM (EF 15%), s/p ICD, HTN  - No arrhythmia, HD stable.  Speed 5400.    - ASA 81 mg, Lipitor  - HTN; lisinopril 2.5 mg BID      Pulm:   - Pulm toilet, IS, activity and deep breathing   - Supplemental O2 PRN to keep sats > 92%  - CPAP PRN at night for PTA SHIRLEY      ID:   - WBC WNL, afebrile, no signs or symptoms of infection      GI / FEN:  - Reg diet, bowel regimen  - Replace Phos per protocol.      Renal / :   - Creatinine WNL, adequate UOP.   - IV fluids 12/12: 250 cc albumin, 250 cc NS   - Diuresis currently held, monitor daily for needs      Heme:   - Hgb 8.3, Plts WNL     Endo:   - DMT2, not needing insulin    - Gout flare; colchicine 1.2 mg on 12/13 AM then start 0.6 mg BID  - Consult Rheumatology 12/13     PPX:   - Protonix     Anticoagulation:   - Warfarin for LVAD, goal 2-3. INR 2.80.       Dispo:   - 6C since 12/11   - Needs LVAD teaching          Discussed with CVTS Fellow   Staff surgeons have been informed of changes through both  verbal and written communication.      Derick Castellon PA-C  Cardiothoracic Surgery  Pager [phone]    8:06 AM   December 13, 2018

## 2018-12-13 NOTE — PLAN OF CARE
"BP (!) 88/76 (BP Location: Left arm)   Pulse 103   Temp 98.3  F (36.8  C) (Oral)   Resp 20   Ht 1.778 m (5' 10\")   Wt 87.2 kg (192 lb 3.9 oz)   SpO2 95%   BMI 27.58 kg/m      D: S/p LVAD HM3 12/5. Hx SHIRLEY, HTN, CHF EF 15%, DM2, ICD, PPM.  I/A: Monitored vitals and assessed pt status. A&O x4. PIs 2.5-3.5.  ST -120. 4bts VT 0407. MAPs 60-70s. 250mL bolus LR given. Reports BLE gout pain in feet and L knee, swelling/edema noted elevated and cold applied. PRN oxycodone x3 and dilaudid x2 given. x2 CTs to -20 sxn. Dressing c/d/i. Upper sternal incision TABBY, ecchymotic. Decreased UOP. Assist x1-2. Slept very little overnight.  P: Continue LVAD teaching, calorie counts, encourage sternal precautions/pulmonary hygiene. Continue to monitor and follow POC. Notify CVTS with changes.      "

## 2018-12-13 NOTE — PROGRESS NOTES
Calorie Count    Intake recorded for: 12/12  Total Kcals: 414 Total Protein: 12g    Kcals from Hospital Food: 286   Protein: 3g    Kcals from Outside Food (average):128 Protein: 9g    # Meals Recorded: 2 (First -  100% 12 oz orange juice, less than 25% pineapple)       (Second - 100% 1 buffalo wing, less than 25% 12 oz 7up from outside the hospital)    # Supplements Recorded: 33% Magic Cup     Note: Patient had bagel with egg whites from outside the hospital, not enough information to calculate calories and protein.

## 2018-12-13 NOTE — PLAN OF CARE
D/I/A: Pt here s/p HM3 implantation. P replaced. Rheum consult completed for gout flare. Pt taking oxy/tylenol/iv dilaudid for gout flare, no complaints of surgical related pain. Pt unable to move other than sitting EOB. All movement painful and must be done extremely slowly. Poor appetite, had small breakfast, no other food noted. Melquiades counts continue. 1 CT removed. DL dressing change done by RN. VSS, VAD #s WNL.  P: Continue to monitor.

## 2018-12-13 NOTE — PLAN OF CARE
6C PT: Pt with another provider and multiple visitors for prolonged period upon 2 attempts by PT. Therapist unable to check back later in PM, will reschedule per PT POC.

## 2018-12-13 NOTE — PLAN OF CARE
D:  S/p HM3 LVAD 12/5  I/A:  PI mid 2's this AM, MAP noted in high 50's mid 60's (see flowsheets) this AM, pt asymptomatic.  Did have 15bt run of VT this AM and 4bt run of VT this afternoon, asymptomatic.  2 CT remain to sxn.  Albumin and 250cc bolus infusing, PI noted to be around 3.3 after albumin and senior care through bolus, continue to monitor.  Mg, K and phos replaced per protocol w/ additional one time dose of Mg.  Gout flare up in feet as well as L knee, pt declined activity most of day although encouraged, along w/ pulm hygiene.  Pain controlled w/ heat/ice, IV dilaudid and PO oxycodone.    P:  LVAD teaching continues.  Pt needs coumadin teaching, PLC order in.  Continue to encourage activity/IS/sternal precautions.

## 2018-12-14 ENCOUNTER — DOCUMENTATION ONLY (OUTPATIENT)
Dept: CARDIOLOGY | Facility: CLINIC | Age: 63
End: 2018-12-14

## 2018-12-14 ENCOUNTER — APPOINTMENT (OUTPATIENT)
Dept: GENERAL RADIOLOGY | Facility: CLINIC | Age: 63
DRG: 001 | End: 2018-12-14
Attending: PHYSICIAN ASSISTANT
Payer: COMMERCIAL

## 2018-12-14 ENCOUNTER — CARE COORDINATION (OUTPATIENT)
Dept: CARDIOLOGY | Facility: CLINIC | Age: 63
End: 2018-12-14

## 2018-12-14 ENCOUNTER — APPOINTMENT (OUTPATIENT)
Dept: PHYSICAL THERAPY | Facility: CLINIC | Age: 63
DRG: 001 | End: 2018-12-14
Attending: INTERNAL MEDICINE
Payer: COMMERCIAL

## 2018-12-14 LAB
ANION GAP SERPL CALCULATED.3IONS-SCNC: 10 MMOL/L (ref 3–14)
BUN SERPL-MCNC: 8 MG/DL (ref 7–30)
CALCIUM SERPL-MCNC: 8.2 MG/DL (ref 8.5–10.1)
CHLORIDE SERPL-SCNC: 104 MMOL/L (ref 94–109)
CO2 SERPL-SCNC: 22 MMOL/L (ref 20–32)
CREAT SERPL-MCNC: 0.48 MG/DL (ref 0.66–1.25)
ERYTHROCYTE [DISTWIDTH] IN BLOOD BY AUTOMATED COUNT: 20.9 % (ref 10–15)
GFR SERPL CREATININE-BSD FRML MDRD: >90 ML/MIN/1.7M2
GLUCOSE BLDC GLUCOMTR-MCNC: 117 MG/DL (ref 70–99)
GLUCOSE BLDC GLUCOMTR-MCNC: 136 MG/DL (ref 70–99)
GLUCOSE BLDC GLUCOMTR-MCNC: 142 MG/DL (ref 70–99)
GLUCOSE BLDC GLUCOMTR-MCNC: 154 MG/DL (ref 70–99)
GLUCOSE SERPL-MCNC: 119 MG/DL (ref 70–99)
HCT VFR BLD AUTO: 26.3 % (ref 40–53)
HGB BLD-MCNC: 8.1 G/DL (ref 13.3–17.7)
INR PPP: 3.09 (ref 0.86–1.14)
MCH RBC QN AUTO: 29.9 PG (ref 26.5–33)
MCHC RBC AUTO-ENTMCNC: 30.8 G/DL (ref 31.5–36.5)
MCV RBC AUTO: 97 FL (ref 78–100)
PLATELET # BLD AUTO: 336 10E9/L (ref 150–450)
POTASSIUM SERPL-SCNC: 4.4 MMOL/L (ref 3.4–5.3)
RBC # BLD AUTO: 2.71 10E12/L (ref 4.4–5.9)
SODIUM SERPL-SCNC: 135 MMOL/L (ref 133–144)
WBC # BLD AUTO: 9.3 10E9/L (ref 4–11)

## 2018-12-14 PROCEDURE — 36592 COLLECT BLOOD FROM PICC: CPT | Performed by: THORACIC SURGERY (CARDIOTHORACIC VASCULAR SURGERY)

## 2018-12-14 PROCEDURE — 85027 COMPLETE CBC AUTOMATED: CPT | Performed by: THORACIC SURGERY (CARDIOTHORACIC VASCULAR SURGERY)

## 2018-12-14 PROCEDURE — 25000132 ZZH RX MED GY IP 250 OP 250 PS 637: Performed by: INTERNAL MEDICINE

## 2018-12-14 PROCEDURE — 25000132 ZZH RX MED GY IP 250 OP 250 PS 637: Performed by: PHYSICIAN ASSISTANT

## 2018-12-14 PROCEDURE — 25000132 ZZH RX MED GY IP 250 OP 250 PS 637

## 2018-12-14 PROCEDURE — 25000125 ZZHC RX 250: Performed by: THORACIC SURGERY (CARDIOTHORACIC VASCULAR SURGERY)

## 2018-12-14 PROCEDURE — 25000132 ZZH RX MED GY IP 250 OP 250 PS 637: Performed by: STUDENT IN AN ORGANIZED HEALTH CARE EDUCATION/TRAINING PROGRAM

## 2018-12-14 PROCEDURE — 85610 PROTHROMBIN TIME: CPT | Performed by: THORACIC SURGERY (CARDIOTHORACIC VASCULAR SURGERY)

## 2018-12-14 PROCEDURE — 97530 THERAPEUTIC ACTIVITIES: CPT | Mod: GP

## 2018-12-14 PROCEDURE — 00000146 ZZHCL STATISTIC GLUCOSE BY METER IP

## 2018-12-14 PROCEDURE — 25000132 ZZH RX MED GY IP 250 OP 250 PS 637: Performed by: NURSE PRACTITIONER

## 2018-12-14 PROCEDURE — 21400006 ZZH R&B CCU INTERMEDIATE UMMC

## 2018-12-14 PROCEDURE — 99232 SBSQ HOSP IP/OBS MODERATE 35: CPT | Mod: GC | Performed by: INTERNAL MEDICINE

## 2018-12-14 PROCEDURE — 40000193 ZZH STATISTIC PT WARD VISIT

## 2018-12-14 PROCEDURE — 80048 BASIC METABOLIC PNL TOTAL CA: CPT | Performed by: THORACIC SURGERY (CARDIOTHORACIC VASCULAR SURGERY)

## 2018-12-14 PROCEDURE — 25000132 ZZH RX MED GY IP 250 OP 250 PS 637: Performed by: THORACIC SURGERY (CARDIOTHORACIC VASCULAR SURGERY)

## 2018-12-14 PROCEDURE — 71045 X-RAY EXAM CHEST 1 VIEW: CPT

## 2018-12-14 RX ORDER — WARFARIN SODIUM 1 MG/1
1 TABLET ORAL
Status: COMPLETED | OUTPATIENT
Start: 2018-12-14 | End: 2018-12-14

## 2018-12-14 RX ORDER — LOSARTAN POTASSIUM 25 MG/1
25 TABLET ORAL DAILY
Status: DISCONTINUED | OUTPATIENT
Start: 2018-12-14 | End: 2018-12-18 | Stop reason: HOSPADM

## 2018-12-14 RX ORDER — FUROSEMIDE 40 MG
40 TABLET ORAL ONCE
Status: COMPLETED | OUTPATIENT
Start: 2018-12-14 | End: 2018-12-14

## 2018-12-14 RX ORDER — COLCHICINE 0.6 MG/1
0.6 TABLET ORAL DAILY
Status: DISCONTINUED | OUTPATIENT
Start: 2018-12-14 | End: 2018-12-18 | Stop reason: HOSPADM

## 2018-12-14 RX ADMIN — COLCHICINE 0.6 MG: 0.6 TABLET, FILM COATED ORAL at 08:13

## 2018-12-14 RX ADMIN — ASPIRIN 81 MG CHEWABLE TABLET 81 MG: 81 TABLET CHEWABLE at 08:17

## 2018-12-14 RX ADMIN — PANTOPRAZOLE SODIUM 40 MG: 40 TABLET, DELAYED RELEASE ORAL at 08:17

## 2018-12-14 RX ADMIN — THIAMINE HCL TAB 100 MG 100 MG: 100 TAB at 10:50

## 2018-12-14 RX ADMIN — TRAZODONE HYDROCHLORIDE 100 MG: 100 TABLET ORAL at 00:22

## 2018-12-14 RX ADMIN — POTASSIUM CHLORIDE 20 MEQ: 750 TABLET, EXTENDED RELEASE ORAL at 08:22

## 2018-12-14 RX ADMIN — ALLOPURINOL 200 MG: 100 TABLET ORAL at 08:13

## 2018-12-14 RX ADMIN — WARFARIN SODIUM 1 MG: 1 TABLET ORAL at 19:05

## 2018-12-14 RX ADMIN — LOSARTAN POTASSIUM 25 MG: 25 TABLET, FILM COATED ORAL at 10:50

## 2018-12-14 RX ADMIN — FUROSEMIDE 40 MG: 40 TABLET ORAL at 14:29

## 2018-12-14 RX ADMIN — MELATONIN TAB 3 MG 3 MG: 3 TAB at 22:00

## 2018-12-14 RX ADMIN — ANAKINRA 100 MG: 100 INJECTION, SOLUTION SUBCUTANEOUS at 16:10

## 2018-12-14 ASSESSMENT — MIFFLIN-ST. JEOR: SCORE: 1679.25

## 2018-12-14 ASSESSMENT — ACTIVITIES OF DAILY LIVING (ADL)
ADLS_ACUITY_SCORE: 19
ADLS_ACUITY_SCORE: 20
ADLS_ACUITY_SCORE: 19
ADLS_ACUITY_SCORE: 21
ADLS_ACUITY_SCORE: 20
ADLS_ACUITY_SCORE: 19

## 2018-12-14 NOTE — PLAN OF CARE
OT6C: Pt with other care providers upon x2 attempts, will check back as able and schedule forward per POC.

## 2018-12-14 NOTE — PROGRESS NOTES
CVTS Daily Note  12/14/2018  Attending: Andrei Silva, *    S:   Overnight events-   Had Left knee aspiration and kenalog/lidocaine injection, improved pain today  Pt seen at bedside resting comfortably.  No acute complaints.      Denies F/C/Sweats.  No CP, SOB, or calf pain.    Tolerating diet well.  + BM.  + Flatus.    Ambulated well with less assistance today.  Pain level now tolerable. Plan as per Neuro section below.     O:   Vitals:    12/13/18 1902 12/13/18 1931 12/13/18 2322 12/14/18 0345   BP: (!) 68/56 (!) 74/67 (!) 71/53 (!) 81/67   BP Location:  Left arm Left arm Left arm   Pulse:       Resp:   16 16   Temp:   98.1  F (36.7  C) 98  F (36.7  C)   TempSrc:   Oral Oral   SpO2:   96% 97%   Weight:    87.3 kg (192 lb 7.4 oz)   Height:         Vitals:    12/10/18 0000 12/11/18 0400 12/14/18 0345   Weight: 85.1 kg (187 lb 9.8 oz) 87.2 kg (192 lb 3.9 oz) 87.3 kg (192 lb 7.4 oz)     + 5 kg since admit and trending stable x 2 days       Intake/Output Summary (Last 24 hours) at 12/14/2018 0656  Last data filed at 12/14/2018 0636  Gross per 24 hour   Intake 960 ml   Output 1530 ml   Net -570 ml       MAPs: 61 - 70  Gen: AAO x 3, pleasant, NAD  CV: LVAD humming, no murmurs, rubs, or gallops. + JVD  Pulm: CTA, no rhonchi or wheezes  Abd: soft, non-tender, no guarding  Ext: moderate peripheral edema, 1-2 + pitting  Incision: clean, dry, intact, no erythema  Chest Tube sites: dressings clean and dry, serosanguinous, no air leak, output 20 cc     * pulled LVAD drain without immediate complication   Lines: driveline dressing clean and dry   LVAD: speed 5400, flow 4.5 - 4.6, PI 2.7 - 3.3, power 4 - 4.2       Labs:  [facility]  Recent Labs   Lab 12/13/18  0649 12/12/18  0547 12/11/18  1613 12/11/18  0350    137 139 140   POTASSIUM 4.3 4.0 3.7 3.8   CHLORIDE 106 108 111* 108   ASHLEE 7.8* 7.6* 7.1* 7.9*   CO2 22 23 23 24   BUN 8 7 9 10   CR 0.61* 0.52* 0.47* 0.59*   GLC 96 96 127* 101*     CBC  Recent Labs   Lab  12/14/18  0555 12/13/18  0649 12/12/18  0547 12/11/18  1613   WBC 9.3 11.2* 9.4 9.0   RBC 2.71* 2.74* 2.65* 2.81*   HGB 8.1* 8.3* 7.9* 8.3*   HCT 26.3* 27.0* 26.7* 27.3*   MCV 97 99 101* 97   MCH 29.9 30.3 29.8 29.5   MCHC 30.8* 30.7* 29.6* 30.4*   RDW 20.9* 20.8* 20.5* 18.9*    262 131* 174     INR  Recent Labs   Lab 12/14/18  0555 12/13/18  0649 12/12/18  0547 12/11/18  0350   INR 3.09* 2.73* 2.80* 1.77*      Hepatic Panel   Lab Results   Component Value Date     12/07/2018     Lab Results   Component Value Date    ALT 18 12/07/2018     Lab Results   Component Value Date    ALBUMIN 3.1 12/07/2018     GLUCOSE:   Recent Labs   Lab 12/13/18  2159 12/13/18  1654 12/13/18  1132 12/13/18  0649 12/12/18  2231 12/12/18  1737 12/12/18  1110 12/12/18  0547  12/11/18  1613  12/11/18  0350  12/10/18  1658  12/10/18  0400   GLC  --   --   --  96  --   --   --  96  --  127*  --  101*  --  94  --  109*   * 108* 138*  --  116* 116* 136*  --    < >  --    < >  --    < >  --    < >  --     < > = values in this interval not displayed.       Imaging:  reviewed recent imaging      A/P:   Danielito Chu is a 63 year-old male with a PMHx s/f SHIRLEY, HTN, CHF (LVEF 15%), NIDDM2 who underwent LVAD placement on 12/5/18 with Dr. Silva. Extubated 12/7.     Neuro:   - Neuro intact  - Tylenol and oxycodone, lidocaine patches   - Sleep; melatonin      CV:   - NYHA class IV stage D, HFrEF 2/2 NICM (EF 15%), s/p ICD, HTN  - No arrhythmia, HD stable.  Speed 5400.    - ASA 81 mg, Lipitor   - HTN; lisinopril 2.5 mg BID stopped and started Losartan 25 mg daily 12/14 per Rheumatology recs      Pulm:   - Pulm toilet, IS, activity and deep breathing   - Supplemental O2 PRN to keep sats > 92%  - CPAP PRN at night for PTA SHIRLEY      ID:   - WBC WNL, afebrile, no signs or symptoms of infection      GI / FEN:  - Reg diet, bowel regimen  - Replace Phos per protocol.      Renal / :   - Creatinine WNL, adequate UOP.   - IV fluids 12/12: 250  cc albumin, 250 cc NS   - Diuresis held 12/12 and 12/13, 12/14 giving Lasix 40 mg PO x 1 dose     Heme:   - Hgb stable 8's, Plts WNL     Endo:   - DMT2, not needing insulin   - Gout flare; consulted Rheumatology 12/13.       ~ left knee aspirated and had kenalog/lidocaine injection      ~ anakinra 100 mg subcutaneous injection x 5 days       ~ start allopurinol 200 mg daily (taper up per Rheum)       ~ continue colchicine 0.6 mg daily for 6 months     PPX:   - Protonix     Anticoagulation:   - Warfarin for LVAD, goal 2-3. INR 2.80.       Dispo:   - 6C since 12/11   - Needs LVAD teaching      Discussed with CVTS Fellow   Staff surgeons have been informed of changes through both  verbal and written communication.      Derick Castellon PA-C  Cardiothoracic Surgery  Pager [phone]    6:56 AM   December 14, 2018

## 2018-12-14 NOTE — PROGRESS NOTES
ADVANCED HEART FAILURE PROGRESS NOTE    SUBJECTIVE:  No acute overnight events. Feeling better this morning, less pain in knee and feet.    ROS otherwise negative.    OBJECTIVE:  Vital signs:  Temp: 98  F (36.7  C) Temp  Min: 98  F (36.7  C)  Max: 98.3  F (36.8  C)  Heart Rate: 106 Heart Rate  Min: 100  Max: 112  BP: (!) 81/67 Systolic (24hrs), Av , Min:65 , Max:81   Diastolic (24hrs), Av, Min:32, Max:67    Resp: 16 Resp  Min: 16  Max: 18  SpO2: 97 % SpO2  Min: 95 %  Max: 97 %      HeartMate III: 5400 RPM, 4.5 LPM, PI 2.7-3.2, 4.1 ureña      Intake/Output Summary (Last 24 hours) at 2018 0644  Last data filed at 2018 0636  Gross per 24 hour   Intake 960 ml   Output 1530 ml   Net -570 ml       Vitals:    12/10/18 0000 18 0400 18 0345   Weight: 85.1 kg (187 lb 9.8 oz) 87.2 kg (192 lb 3.9 oz) 87.3 kg (192 lb 7.4 oz)         Physical Exam:  General: lying in bed, appear tired but comfortable  Resp: scattered rales, breathing comfortably  CV: regular, tachycardic, VAD hum, JVD 10cm, incisions clean  Abdomen: Soft, Non-distended, Non-tender  Extremities: warm and well perfused, mild pitting edema         Medications:    BETA BLOCKER NOT PRESCRIBED       Warfarin Therapy Reminder         Current Facility-Administered Medications   Medication Dose Route Frequency     allopurinol  200 mg Oral Daily     anakinra  100 mg Subcutaneous Q24H     aspirin  81 mg Oral Daily     colchicine  0.6 mg Oral BID     insulin aspart  1-7 Units Subcutaneous TID AC     insulin aspart  1-5 Units Subcutaneous At Bedtime     lidocaine  2 patch Transdermal Q24h    And     lidocaine   Transdermal Q24H    And     lidocaine   Transdermal Q8H     lisinopril  2.5 mg Oral BID     melatonin  3 mg Oral At Bedtime     pantoprazole  40 mg Oral QAM AC     polyethylene glycol  17 g Oral BID     potassium chloride  20 mEq Oral Daily     senna-docusate  2 tablet Oral BID     sodium chloride (PF)  3 mL Intracatheter Q8H     vitamin  B1  100 mg Oral Daily         Labs:  CMP  Recent Labs   Lab 12/13/18  0649 12/12/18  0547    137   POTASSIUM 4.3 4.0   CHLORIDE 106 108   CO2 22 23   BUN 8 7   CR 0.61* 0.52*   ASHLEE 7.8* 7.6*   MAG 2.1 1.8   PHOS 2.4* 2.2*   GLC 96 96     BLOOD GAS  Recent Labs   Lab 12/08/18  0953 12/07/18  1617   PH 7.40 7.38   PCO2 42 40   PO2 84 128*     CBC  Recent Labs   Lab 12/14/18  0555 12/13/18  0649   WBC 9.3 11.2*   HGB 8.1* 8.3*   HCT 26.3* 27.0*    262     COAG  Recent Labs   Lab 12/14/18  0555 12/13/18  0649   INR 3.09* 2.73*         ASSESSMENT/PLAN:  Danielito Chu is a 63 year-old male with a PMHx s/f SHIRLEY, HTN, CHF (LVEF 15%), NIDDM2 who underwent HeartMate III LVAD placement on 12/5/18 with Dr. Silva.      #h/o HTN  #CHF s/p Heartmate III LVAD placement 12/5  - agree with rheumatology consult for assistance with gout magagement  - echo shows good RV function, septum is midline.  - on Aspirin 81mg  - INR therapeutic, on warfarin  - speed 5400 RPM, tolerating well.  - lisinopril 2.5mg BID  - appears slightly volume overloaded, recommend gentle diuresis    #Gout  - rheumatology consulted, appreciate assistance  - s/p knee tap on 12/13  - started anakinra, on colchicine     Thank you for allowing me to care for this patient. This has been discussed with the attending physician.      Moisés Rockwell MD  Advanced Heart Failure Fellow  [phone]    I have reviewed today's vital signs, notes, medications, labs and imaging.  I have also seen and examined the patient and agree with the findings and plan as outlined above.  Pt feeling better.  Spouse at bedside. VSS with lungs clear and mechanical LVAD hum.  Labs as above.  Assessment: Pt with endstage heart failure with HM3 now with afterload reduction and treatment of gout.      Bennett Rene MD, PhD  Professor, Heart Failure and Cardiac Transplantation  [facility]

## 2018-12-14 NOTE — PROGRESS NOTES
CLINICAL NUTRITION SERVICES    Kcal counts:  12/12   414+ kcals and 12+ g protein (Two meal/s and 33% of Magic Cup supplement/s recorded) - Pt also had a bagel with egg whites from outside the hospital, not enough information to calculate calories and protein.   12/13   667 kcals and 19 g protein (meal/s and no supplement/s recorded)  12/14   Pending. Per chart, pt with a good appetite consuming 100% of meals so far.   * Pt consumed a two-day average of 541+ kcals and 16+ g protein daily. Pt is on a regular diet and has scheduled oral supplements ordered.      INTERVENTIONS:  Implementation:  None additionally at this time.     Follow up/Monitoring:  Will continue to follow pt.    Rachel Bradshaw, MS, RD, LD, [facility]   6C Pgr: [phone]

## 2018-12-14 NOTE — PLAN OF CARE
PT 6C Discharge Planner PT   Patient plan for discharge: Rehab 2/2 decreased activity from gout flare  Current status: Long discussion today about PT POC,  balancing pain management during acute gout flare with activity progression.  Agreed to wait 3 days until next PT session for gout flare to further reduce, next appointment 12/17.  Pt to continue with OT/CR.  Pt able to pivot bed<chair CGA FWW, needs an elevated sitting surface.  Barriers to return to prior living situation: Difficulty with ambulation, medical status  Recommendations for discharge: TCU  Rationale for recommendations: Pt with deconditioning s/p LVAD now complicated with gout flare slowing hospital therapy progress.  Anticipate once gout pain decreases, pt will progress mobility and ADL's quickly.       Entered by: Aislinn Thomason 12/14/2018 5:05 PM

## 2018-12-14 NOTE — PROGRESS NOTES
After 6 teaching sessions with Yanna, 4 with Fabiano, 5 with his friend Angel,and 5 with his sister Fani, this is the update:    Checked off on the dsng change: Yanna    Needs more dsng change practice: Fani and Angel  Checked off on the controller change emergency procedure: Fabiano, Yanna, Angel, and Fani.    The Test: Yanna is taking the test this weekend and will mail it to me as she is flying back to [place] on Sunday. I will call her to correct the test over the phone. Fani and Fabiano will take the test on Tuesday 12/18 or so next week. Angel will be coming back to the [facility] in January with Fabiano's brother Brenden. They will both finish their training then.    The caregiver plan is for Fani, the sister from [place] to be with Fabiano through March 2019. Then Angel the friend will be living near Fabiano in [place], [place]. Brenden may also be a primary caregiver.  Yanna visits Fabiano on occasion.    Anticipated discontinue from the hospital on 12/17 or 12/18 with a stay at [facility] planned.    Each learner is on track to completing education or has a plan for completing education.

## 2018-12-14 NOTE — PROGRESS NOTES
D/I/A: Pt resting between cares and treatments.  A+O x4 and able to make needs known.  Medicated for pain effectively with current med orders.  Pain improved a couple hours post injection.  Encouraged oral intake of food and fluids.  Voiding in fair amounts, urine jennifer in color.  VSSA.  LVAD operating within normal limits and free from alarm.  CT to sxn with not noted air leak, moderate amount of output this evening.  Dressings C/D/I.  Family/visitors at bedside for part of the evening and supportive.  P: Continue to monitor status.  Encourage oral intake of nutrition.  Encourage rest between activity; encourage OOB during daytime hours.

## 2018-12-14 NOTE — PROCEDURES
"Arthrocentesis  Date/Time: December 13, 2018  Performed by: [place] Mueller  Authorized by:   Consent: Verbal consent obtained. Written consent obtained.  Risks and benefits: risks, benefits and alternatives were discussed  Consent given by: patient  Patient understanding: patient states understanding of the procedure being performed  Patient consent: the patient's understanding of the procedure matches consent given  Procedure consent: procedure consent matches procedure scheduled  Relevant documents: relevant documents present and verified  Test results: test results available and properly labeled  Site marked: the operative site was marked  Imaging studies: imaging studies not required  Required items: required blood products, implants, devices, and special equipment available  Patient identity confirmed: verbally with patient  Time out: Immediately prior to procedure a \"time out\" was called to verify the correct patient, procedure, equipment, support staff and site/side marked as required.  Indications: crystal arthritis  Body area: left knee  Location details: left knee, medial approach  Joint: left knee  Local anesthesia used: yes   Anesthesia:  Local anesthesia used: yes  Local Anesthetic: lidocaine 1%, 1 ml  Sedation:  Patient sedated: no  Preparation: Patient was prepped and injection was completed in sterile fashion  Needle size: 22 G  Ultrasound guidance: no  Approach: medial  kanelog amount: 40 mg  Lidocaine 1% amount: 1 cc  Patient tolerance: Patient received 1ml of lidocaine 1% for local anesthesia. I injected his L knee with kenalog 40 mg (1ml)+lidocaine 1% (1 ml).  Patient tolerated the procedure well with no immediate complications.     Dr. Nassar supervised the procedure.    [place] Mueller  Rheumatology Fellow    Attending Note: I was present during the entire procedure and supervised the procedure done by Dr. Mueller.    Melody Nassar MD      "

## 2018-12-14 NOTE — PLAN OF CARE
S/P HM3 on 12/5. AVSS (-110) on room air, no LVAD alarms. Patients concerns today mostly about his gout in bilateral feet and L knee. Reports his pain is finally improving today but he feels like he is days behind with his activtiy level because of gout.     Voiding adequately in urinal, 40 PO lasix given this afternoon, +2 edema in BLE, +1 generalzed. +BM, appetite improving, family bring pt food choices from home and OSH. LS clear/fine crackles in bases, encourage up in chair (x1 today) and IS.    Chest tube removed by CVTS.  LVAD teaching today, coordinator completed dressing change.  Warfarin teaching completed in room by [facility].

## 2018-12-14 NOTE — CONSULTS
[facility] Rheumatology IP consult    Consult for: gout  Consult by: PITER Chandler    DOS: 12/13/2018      ASSESSMENT: 64 yo M with history of DM2,CHF s/p LVAD (12/5/18) and currently in likely gout flare in setting of significant fluid shifts. He was started on colchicine with some benefit but with persistent pain limiting therapy and movement. He is not a great candidate for prednisone as there is concern for fluid retention and worsening BG in setting of DM. He does not have a previous crystal evidence as previous two flares were mainly limited to complex and small joints of feet however we have taken the opportunity today to aspirate his left knee for evaluation and also for pain relief.     To prevent future flares, please start patient on allopurinol 200 mg daily with goal uric acid <6. As there is increased risk for flare in setting of allopurinol start, continue colchicine 0.6 mg qd now and outpatient for ~6 months till serum uric acid level reaches at goal below 6, it would take 2 weeks for serum uric acid level to change, plan is to gradually increase allopurinol till we reach the goal, SUA below 6. Recent studies show that starting allopurinol during acute flare is safe, effective, and prevents loss to followup. Atorvastatin and losartan are both uricosuric can also contribute to preventing flares.    DIAGNOSIS:  1. Likely acute gout flare (polyarticular)  2. CHF s/p LVAD  3. DM2    PLAN:  1. Left knee aspiration completed, ~9 ml yellow turbid fluid removed and sent to lab. The L knee was injected with kenalog 40 mg+lidocaine 1% 1 ml. Please see the procedure note  2. Change colchicine to 0.6 mg qd  3. Start anakinra 100 mg subcutaneous inj qd x5 days for immediate relief; risks were discussed  4. Start allopurinol 200 mg daily for flare prevention. Goal uric acid < 6. Recommend to recheck uric acid in 2 weeks.  5. Xrays of feet to eval for erosions  6. Consider changing patient from lisinopril to  losartan.    I discussed the findings and recommendations with the patient.  I communicated the assessment and plan to the referring team directly.  Rheumatology will continue to follow with you.    Case seen and discussed with Dr. Nassar.    Indiana Mueller MD, PhD  Rheumatology Fellow  Pager [phone]    Attending Note: I saw and evaluated the patient with Dr. Mueller on 12/13/2018. I agree with the assessment and plan. I supervised the procedure.    Addendum: Synovial fluid was positive for mono sodium urate crystals and confirmed gout Dx.    Melody Nassar MD           HPI: 62 yo M with history of DM2,CHF s/p LVAD (12/5/18) and currently in likely gout flare in setting of significant fluid shifts. This is his third flare. His original flare was in left MTP, second flare in right MTP/foot, and today he has pain in both feet R>L and left knee. He has edema of left knee. He feels that this flare started right around the time of LVAD placement and he was a little slow in starting colchicine and that is why it is not working as well as usual. He has never before had an aspiration. His flares only occur during diuresis. He denies fevers, chills, night sweats.    HISTORY REVIEW:  Past Medical History:   Diagnosis Date     Acute systolic congestive heart failure (H) 4/5/2017     Diabetes (H)      HTN (hypertension)      Hyperlipidemia      Mitral regurgitation      Nocturnal oxygen desaturation 3/29/2018     Nonischemic cardiomyopathy (H) 4/5/2017     SHIRLEY (obstructive sleep apnea)      Pacemaker 04/07/2017    ICD 4/7/17     Past Surgical History:   Procedure Laterality Date     ESOPHAGOSCOPY, GASTROSCOPY, DUODENOSCOPY (EGD), COMBINED N/A 11/21/2018    Procedure: COMBINED ESOPHAGOSCOPY, GASTROSCOPY, DUODENOSCOPY (EGD);  Surgeon: Candido Mendez MD;  Location:  GI     IMPLANT IMPLANTABLE CARDIOVERTER DEFIBRILLATOR       INSERT VENTRICULAR ASSIST DEVICE LEFT (HEARTMATE II/III) MINIMALLY INVASIVE N/A 12/5/2018     Procedure: Partial Median Sternotomy, Left Thoracotomy, Minimally Invasive Left Ventricular Assist Device Placement (Heartmate III), On Cardiopulmonary Bypass;  Surgeon: Andrei Silva MD;  Location:  OR     Family History   Problem Relation Age of Onset     Heart Failure Father          at age 86     Heart Failure Paternal Uncle          at 66      Myocardial Infarction Paternal Uncle          at age 62     Coronary Artery Disease Mother          during CABG at age 41     Social History     Socioeconomic History     Marital status: Single     Spouse name: Not on file     Number of children: Not on file     Years of education: Not on file     Highest education level: Not on file   Social Needs     Financial resource strain: Not on file     Food insecurity - worry: Not on file     Food insecurity - inability: Not on file     Transportation needs - medical: Not on file     Transportation needs - non-medical: Not on file   Occupational History     Not on file   Tobacco Use     Smoking status: Former Smoker     Smokeless tobacco: Never Used     Tobacco comment: quit 3/2017   Substance and Sexual Activity     Alcohol use: Not on file     Comment: social     Drug use: No     Sexual activity: Not on file   Other Topics Concern     Parent/sibling w/ CABG, MI or angioplasty before 65F 55M? Not Asked   Social History Narrative     Not on file     Patient Active Problem List   Diagnosis     CHF (congestive heart failure) (H)     Acute systolic congestive heart failure (H)     Nonischemic cardiomyopathy (H)     Cardiac defibrillator, [employer], Single Chamber- NOT dependent     Nocturnal oxygen desaturation     Acute on chronic systolic CHF (congestive heart failure) (H)     Heart failure with acute decompensation, type unknown (H)     Acute on chronic systolic heart failure (H)     Heart failure (H)     No Known Allergies    REVIEW OF SYMPTOMS:  A comprehensive ROS was done. Positives are  per HPI.      OBJECTIVE:  PHYSICAL EXAM  Temp:  [98.1  F (36.7  C)-98.3  F (36.8  C)] 98.1  F (36.7  C)  Heart Rate:  [100-113] 103  Resp:  [16-20] 16  BP: (65-88)/(32-76) 74/67  SpO2:  [95 %-97 %] 95 %     Wt Readings from Last 4 Encounters:   12/11/18 87.2 kg (192 lb 3.9 oz)   11/26/18 83.5 kg (184 lb)   11/21/18 80.2 kg (176 lb 11.2 oz)   11/13/18 84.8 kg (187 lb)     Constitutional: WD-WN-WG cooperative  Eyes: nl EOM, PERRLA, vision, conjunctiva, sclera  ENT: nl external ears, nose, hearing, lips, teeth, gums, throat  No mucous membrane lesions, normal saliva pool  Neck: no mass or thyroid enlargement  Resp: bibasilar crackles  CV: hum of lvad, scar on chest not erythematous  GI: slightly obese, nontender, nondistended  : not tested  Lymph: no cervical nodes  MS: L knee warmth, tenderness and moderate size effusion   - dorsal edema over both feet, R>L   - salmon colored discoloration over both 1st MTPs   - tenderness to palpation of all MTPs, mid-foot, and near ankles. Also pain with movement of knees.    Skin: no nail pitting, alopecia, nodules or lesions  Neuro: nl cranial nerves, strength  Psych: nl affect.    DATA:  Outside Records: Not Applicable  Outside Xrays: Not Applicable  CBC:  Recent Labs   Lab Test 12/13/18  0649 12/12/18  0547 12/11/18  1613   WBC 11.2* 9.4 9.0   RBC 2.74* 2.65* 2.81*   HGB 8.3* 7.9* 8.3*   HCT 27.0* 26.7* 27.3*   MCV 99 101* 97   MCH 30.3 29.8 29.5   MCHC 30.7* 29.6* 30.4*   RDW 20.8* 20.5* 18.9*    131* 174       BMP:  Recent Labs   Lab Test 12/13/18  0649 12/12/18  0547 12/11/18  1613    137 139   POTASSIUM 4.3 4.0 3.7   CHLORIDE 106 108 111*   CO2 22 23 23   ANIONGAP 8 6 6   GLC 96 96 127*   BUN 8 7 9   CR 0.61* 0.52* 0.47*   GFRESTIMATED >90 >90 >90   ASHLEE 7.8* 7.6* 7.1*       LFT:  Recent Labs   Lab Test 12/07/18  0403 12/04/18  0800 12/03/18  1850   PROTTOTAL 5.2* 6.8 7.1   ALBUMIN 3.1* 3.2* 3.2*   BILITOTAL 5.8* 2.0* 2.2*   ALKPHOS 72 97 99   * 14 12    ALT 18 13 14       No results found for: CKTOTAL  TSH   Date Value Ref Range Status   10/24/2018 6.66 (H) 0.40 - 4.00 mU/L Final     Lab Results   Component Value Date    URIC 12.0 11/13/2018    URIC 12.9 10/24/2018    URIC 5.7 04/08/2017       Inflammatory markers  Lab Results   Component Value Date    CRP 3.1 10/27/2018    CRP 4.4 10/24/2018    CRP 5.0 10/24/2018    CRP 13.0 04/08/2017       Ferritin   Date Value Ref Range Status   10/24/2018 67 26 - 388 ng/mL Final   03/30/2017 290 26 - 388 ng/mL Final       UA RESULTS:  Recent Labs   Lab Test 12/07/18  0801 11/29/18  1310 10/23/18  1810   COLOR Dark Yellow Yellow Yellow   APPEARANCE Slightly Cloudy Clear Clear   URINEGLC Negative Negative Negative   URINEBILI Small* Negative Negative   URINEKETONE Negative Negative Negative   SG 1.015 1.010 1.008   UBLD Negative Negative Negative   URINEPH 5.0 7.5* 6.5   PROTEIN 10* 10* Negative   NITRITE Negative Negative Negative   LEUKEST Trace* Negative Negative   RBCU 4* 1 <1   WBCU 2 2 0

## 2018-12-14 NOTE — PROGRESS NOTES
Calorie Count    Intake recorded for: 12/13  Total Kcals: 667 Total Protein: 19g    Kcals from Hospital Food: 667   Protein: 19g    Kcals from Outside Food (average):0 Protein: 0g    # Meals Recorded: 100% bread stick, lemon bar, ice cream, 50% hamburger vinay - from cafeteria     # Supplements Recorded: 0

## 2018-12-15 LAB
ANION GAP SERPL CALCULATED.3IONS-SCNC: 10 MMOL/L (ref 3–14)
BUN SERPL-MCNC: 12 MG/DL (ref 7–30)
CALCIUM SERPL-MCNC: 8.2 MG/DL (ref 8.5–10.1)
CHLORIDE SERPL-SCNC: 101 MMOL/L (ref 94–109)
CO2 SERPL-SCNC: 22 MMOL/L (ref 20–32)
CREAT SERPL-MCNC: 0.59 MG/DL (ref 0.66–1.25)
ERYTHROCYTE [DISTWIDTH] IN BLOOD BY AUTOMATED COUNT: 20.7 % (ref 10–15)
GFR SERPL CREATININE-BSD FRML MDRD: >90 ML/MIN/1.7M2
GLUCOSE BLDC GLUCOMTR-MCNC: 122 MG/DL (ref 70–99)
GLUCOSE BLDC GLUCOMTR-MCNC: 130 MG/DL (ref 70–99)
GLUCOSE BLDC GLUCOMTR-MCNC: 134 MG/DL (ref 70–99)
GLUCOSE BLDC GLUCOMTR-MCNC: 150 MG/DL (ref 70–99)
GLUCOSE BLDC GLUCOMTR-MCNC: 159 MG/DL (ref 70–99)
GLUCOSE SERPL-MCNC: 145 MG/DL (ref 70–99)
HCT VFR BLD AUTO: 26.5 % (ref 40–53)
HGB BLD-MCNC: 8.1 G/DL (ref 13.3–17.7)
INR PPP: 2.66 (ref 0.86–1.14)
MCH RBC QN AUTO: 30 PG (ref 26.5–33)
MCHC RBC AUTO-ENTMCNC: 30.6 G/DL (ref 31.5–36.5)
MCV RBC AUTO: 98 FL (ref 78–100)
PLATELET # BLD AUTO: 440 10E9/L (ref 150–450)
POTASSIUM SERPL-SCNC: 3.8 MMOL/L (ref 3.4–5.3)
RBC # BLD AUTO: 2.7 10E12/L (ref 4.4–5.9)
SODIUM SERPL-SCNC: 133 MMOL/L (ref 133–144)
WBC # BLD AUTO: 10.2 10E9/L (ref 4–11)

## 2018-12-15 PROCEDURE — 21400006 ZZH R&B CCU INTERMEDIATE UMMC

## 2018-12-15 PROCEDURE — 25000132 ZZH RX MED GY IP 250 OP 250 PS 637: Performed by: PHYSICIAN ASSISTANT

## 2018-12-15 PROCEDURE — 36592 COLLECT BLOOD FROM PICC: CPT | Performed by: THORACIC SURGERY (CARDIOTHORACIC VASCULAR SURGERY)

## 2018-12-15 PROCEDURE — 25000128 H RX IP 250 OP 636: Performed by: PHYSICIAN ASSISTANT

## 2018-12-15 PROCEDURE — 25000132 ZZH RX MED GY IP 250 OP 250 PS 637: Performed by: THORACIC SURGERY (CARDIOTHORACIC VASCULAR SURGERY)

## 2018-12-15 PROCEDURE — 25000132 ZZH RX MED GY IP 250 OP 250 PS 637: Performed by: STUDENT IN AN ORGANIZED HEALTH CARE EDUCATION/TRAINING PROGRAM

## 2018-12-15 PROCEDURE — 25000132 ZZH RX MED GY IP 250 OP 250 PS 637: Performed by: INTERNAL MEDICINE

## 2018-12-15 PROCEDURE — 25000132 ZZH RX MED GY IP 250 OP 250 PS 637: Performed by: NURSE PRACTITIONER

## 2018-12-15 PROCEDURE — 25000125 ZZHC RX 250: Performed by: THORACIC SURGERY (CARDIOTHORACIC VASCULAR SURGERY)

## 2018-12-15 PROCEDURE — 00000146 ZZHCL STATISTIC GLUCOSE BY METER IP

## 2018-12-15 PROCEDURE — 85610 PROTHROMBIN TIME: CPT | Performed by: THORACIC SURGERY (CARDIOTHORACIC VASCULAR SURGERY)

## 2018-12-15 PROCEDURE — 80048 BASIC METABOLIC PNL TOTAL CA: CPT | Performed by: THORACIC SURGERY (CARDIOTHORACIC VASCULAR SURGERY)

## 2018-12-15 PROCEDURE — 40000802 ZZH SITE CHECK

## 2018-12-15 PROCEDURE — 85027 COMPLETE CBC AUTOMATED: CPT | Performed by: THORACIC SURGERY (CARDIOTHORACIC VASCULAR SURGERY)

## 2018-12-15 RX ORDER — BUMETANIDE 0.25 MG/ML
1 INJECTION INTRAMUSCULAR; INTRAVENOUS 2 TIMES DAILY
Status: COMPLETED | OUTPATIENT
Start: 2018-12-15 | End: 2018-12-15

## 2018-12-15 RX ORDER — POTASSIUM CHLORIDE 1.5 G/1.58G
40 POWDER, FOR SOLUTION ORAL 2 TIMES DAILY
Status: DISCONTINUED | OUTPATIENT
Start: 2018-12-15 | End: 2018-12-15

## 2018-12-15 RX ORDER — WARFARIN SODIUM 1 MG/1
2 TABLET ORAL
Status: COMPLETED | OUTPATIENT
Start: 2018-12-15 | End: 2018-12-15

## 2018-12-15 RX ORDER — POTASSIUM CHLORIDE 750 MG/1
40 TABLET, EXTENDED RELEASE ORAL 2 TIMES DAILY
Status: DISCONTINUED | OUTPATIENT
Start: 2018-12-15 | End: 2018-12-16

## 2018-12-15 RX ADMIN — POTASSIUM CHLORIDE 40 MEQ: 750 TABLET, EXTENDED RELEASE ORAL at 13:04

## 2018-12-15 RX ADMIN — COLCHICINE 0.6 MG: 0.6 TABLET, FILM COATED ORAL at 08:58

## 2018-12-15 RX ADMIN — LOSARTAN POTASSIUM 25 MG: 25 TABLET, FILM COATED ORAL at 08:58

## 2018-12-15 RX ADMIN — THIAMINE HCL TAB 100 MG 100 MG: 100 TAB at 08:58

## 2018-12-15 RX ADMIN — MELATONIN TAB 3 MG 3 MG: 3 TAB at 22:12

## 2018-12-15 RX ADMIN — POTASSIUM CHLORIDE 40 MEQ: 750 TABLET, EXTENDED RELEASE ORAL at 20:08

## 2018-12-15 RX ADMIN — BUMETANIDE 1 MG: 0.25 INJECTION INTRAMUSCULAR; INTRAVENOUS at 17:29

## 2018-12-15 RX ADMIN — PANTOPRAZOLE SODIUM 40 MG: 40 TABLET, DELAYED RELEASE ORAL at 08:58

## 2018-12-15 RX ADMIN — ALLOPURINOL 200 MG: 100 TABLET ORAL at 10:53

## 2018-12-15 RX ADMIN — ASPIRIN 81 MG CHEWABLE TABLET 81 MG: 81 TABLET CHEWABLE at 08:58

## 2018-12-15 RX ADMIN — ANAKINRA 100 MG: 100 INJECTION, SOLUTION SUBCUTANEOUS at 16:19

## 2018-12-15 RX ADMIN — BUMETANIDE 1 MG: 0.25 INJECTION INTRAMUSCULAR; INTRAVENOUS at 13:03

## 2018-12-15 RX ADMIN — WARFARIN SODIUM 2 MG: 1 TABLET ORAL at 17:29

## 2018-12-15 RX ADMIN — POTASSIUM CHLORIDE 20 MEQ: 750 TABLET, EXTENDED RELEASE ORAL at 08:58

## 2018-12-15 ASSESSMENT — MIFFLIN-ST. JEOR: SCORE: 1681.69

## 2018-12-15 ASSESSMENT — ACTIVITIES OF DAILY LIVING (ADL)
ADLS_ACUITY_SCORE: 18
ADLS_ACUITY_SCORE: 17

## 2018-12-15 NOTE — PROGRESS NOTES
ADVANCED HEART FAILURE PROGRESS NOTE    SUBJECTIVE:  No acute events. Feeling better this morning, pain in feet improved, able to walk some today.     ROS otherwise negative.    OBJECTIVE:  Vital signs:  Temp: 98.3  F (36.8  C) Temp  Min: 96.8  F (36  C)  Max: 98.7  F (37.1  C)  Heart Rate: 102 Heart Rate  Min: 95  Max: 112  BP: 93/84 Systolic (24hrs), Av , Min:80 , Max:98   Diastolic (24hrs), Av, Min:51, Max:84    Resp: 16 Resp  Min: 16  Max: 18  SpO2: 98 % SpO2  Min: 95 %  Max: 98 %      HeartMate III: 5400 RPM, 4.6 LPM, PI 2.8-3.2      Intake/Output Summary (Last 24 hours) at 12/15/2018 0744  Last data filed at 12/15/2018 0600  Gross per 24 hour   Intake 1400 ml   Output 1800 ml   Net -400 ml       Vitals:    18 0400 18 0345 12/15/18 0401   Weight: 87.2 kg (192 lb 3.9 oz) 87.3 kg (192 lb 7.4 oz) 87.5 kg (193 lb)       Physical Exam:  General: lying in bed, appears comfortable  Resp: clear bilaterally, breathing comfortably  CV: regular, tachycardic, VAD hum, JVD 10cm, incisions clean  Abdomen: Soft, Non-distended, Non-tender  Extremities: warm and well perfused, mild pitting edema      Medications:    BETA BLOCKER NOT PRESCRIBED       Warfarin Therapy Reminder         Current Facility-Administered Medications   Medication Dose Route Frequency     allopurinol  200 mg Oral Daily     anakinra  100 mg Subcutaneous Q24H     aspirin  81 mg Oral Daily     colchicine  0.6 mg Oral Daily     insulin aspart  1-7 Units Subcutaneous TID AC     insulin aspart  1-5 Units Subcutaneous At Bedtime     lidocaine  2 patch Transdermal Q24h    And     lidocaine   Transdermal Q24H    And     lidocaine   Transdermal Q8H     losartan  25 mg Oral Daily     melatonin  3 mg Oral At Bedtime     pantoprazole  40 mg Oral QAM AC     polyethylene glycol  17 g Oral BID     potassium chloride  20 mEq Oral Daily     senna-docusate  2 tablet Oral BID     sodium chloride (PF)  3 mL Intracatheter Q8H     vitamin B1  100 mg Oral  Daily         Labs:  CMP  Recent Labs   Lab 12/15/18  0640 12/14/18  0555 12/13/18  0649 12/12/18  0547    135 136 137   POTASSIUM 3.8 4.4 4.3 4.0   CHLORIDE 101 104 106 108   CO2 22 22 22 23   BUN 12 8 8 7   CR 0.59* 0.48* 0.61* 0.52*   ASHLEE 8.2* 8.2* 7.8* 7.6*   MAG  --   --  2.1 1.8   PHOS  --   --  2.4* 2.2*   * 119* 96 96     BLOOD GAS  Recent Labs   Lab 12/08/18  0953   PH 7.40   PCO2 42   PO2 84     CBC  Recent Labs   Lab 12/15/18  0640 12/14/18  0555   WBC 10.2 9.3   HGB 8.1* 8.1*   HCT 26.5* 26.3*    336     COAG  Recent Labs   Lab 12/15/18  0640 12/14/18  0555   INR 2.66* 3.09*         ASSESSMENT/PLAN:  Danielito Chu is a 63 year-old male with a PMHx s/f SHIRLEY, HTN, CHF (LVEF 15%), NIDDM2 who underwent HeartMate III LVAD placement on 12/5/18 with Dr. Silva.      #h/o HTN  #CHF s/p Heartmate III LVAD placement 12/5  - agree with rheumatology consult for assistance with gout magagement  - echo shows good RV function, septum is midline.  - on Aspirin 81mg  - INR therapeutic, on warfarin  - speed 5400 RPM, tolerating well.  - lisinopril 2.5mg BID  - appears volume overloaded, agree with diuresis     #Gout  - rheumatology consulted, appreciate assistance  - s/p knee tap on 12/13  - started anakinra, on colchicine     Thank you for allowing me to care for this patient. This has been discussed with the attending physician.      Moisés Rockwell MD  Advanced Heart Failure Fellow  [phone]      Pt's condition and care plan discussed with fellow but patient not seen personally by me today.    Ashley Araujo MD  Section Head - Advanced Heart Failure, Transplantation and Mechanical Circulatory Support  Co-Director - [facility]  Associate Professor of Medicine, [facility]

## 2018-12-15 NOTE — PROGRESS NOTES
Calorie Counts  Intake recorded for: 12/14 Total Kcals: 586  Total Protein: 57g  Kcals from Hospital Food: 126   Protein: 23g  Kcals from Outside Food: 460   Protein: 34g  # Meals recorded: 3 meals    First: 75% orange, pineapple, peaches   Second: 50% roast beef, carrots, fingerling potatoes, 25% scallop corn   Third: 505% beef, corn, fingerling potatoes, 25% carrots  # Supplements recorded: 0

## 2018-12-15 NOTE — PLAN OF CARE
S/p HM3 LVAD placed 12/5.   VSS on room air, no LVAD alarms. Endorses some pain in bilateral feet and left knee but improving. Pt is able to ambulate to bathroom and up in the chair for breakfast this morning.   Driveline dressing changed by circulator RN.  Patient frustrated today due to his cell phone being lost overnight.  Continue with current plan of care. Notify CVTS of changes/concerns. Encourage activity.

## 2018-12-15 NOTE — PLAN OF CARE
D: S/p non-invasive LVAD HM 3 implant 12/5. Hx. CHF, HTN, SHIRLEY, DM2.  I/A: A&Ox4. VSS on RA. Mild BAJWA. SR/ST 90s-110s. LVAD #s wnl (except PIs intermittently 2.6-2.8, otherwise 3s, Cards Cross-cover notified), no alarms. Denies pain. Adequate uop. SBA. Appeared to rest comfortably overnight.   P: Discharge pending medical stability and LVAD teaching. Continue to monitor and notify CVTS with questions/concerns.

## 2018-12-15 NOTE — PROGRESS NOTES
12/15/2018   CVTS Progress Note  Attending provider: Andrei Silva, *        S:  No acute issues over night. Patient denies  SOB, CP, fever, chills, sweats. Patient has poor appetite. +ambulating in halls. + BM. No arrhythmias. A little down today when thinking of lifestyle modifications. Was in better spirits by end of visit. Patient feeling extra fluid on body.     O:   Vitals:    12/15/18 0401 12/15/18 0423 12/15/18 0732 12/15/18 1105   BP:  93/71 93/84 94/50   BP Location:  Left arm Left arm Left arm   Pulse:       Resp:  16 16 16   Temp:  98.7  F (37.1  C) 98.3  F (36.8  C) 98.2  F (36.8  C)   TempSrc:  Oral Oral Oral   SpO2:  97% 98% 97%   Weight: 87.5 kg (193 lb)      Height:         Vitals:    12/11/18 0400 12/14/18 0345 12/15/18 0401   Weight: 87.2 kg (192 lb 3.9 oz) 87.3 kg (192 lb 7.4 oz) 87.5 kg (193 lb)       Intake/Output Summary (Last 24 hours) at 12/15/2018 1150  Last data filed at 12/15/2018 0756  Gross per 24 hour   Intake 800 ml   Output 1400 ml   Net -600 ml       Gen: AxOx3, NAD  CV: RRR,  HR , MAPS 70-80  HM3 speed 5400, PI 3.1, flow 4.5, pw 4.1  Pulm: +CTA, no wheezing, no rhonchi  Abd: soft, NT, ND, +BS, +BM  Ext: 1+ LE edema  Incision: c/d/i, no erythema, sternal stable  Tubes/drains: all removed     Labs:   BMP  Recent Labs   Lab 12/15/18  0640 12/14/18  0555 12/13/18  0649 12/12/18  0547    135 136 137   POTASSIUM 3.8 4.4 4.3 4.0   CHLORIDE 101 104 106 108   ASHLEE 8.2* 8.2* 7.8* 7.6*   CO2 22 22 22 23   BUN 12 8 8 7   CR 0.59* 0.48* 0.61* 0.52*   * 119* 96 96     CBC  Recent Labs   Lab 12/15/18  0640 12/14/18  0555 12/13/18  0649 12/12/18  0547   WBC 10.2 9.3 11.2* 9.4   RBC 2.70* 2.71* 2.74* 2.65*   HGB 8.1* 8.1* 8.3* 7.9*   HCT 26.5* 26.3* 27.0* 26.7*   MCV 98 97 99 101*   MCH 30.0 29.9 30.3 29.8   MCHC 30.6* 30.8* 30.7* 29.6*   RDW 20.7* 20.9* 20.8* 20.5*    336 262 131*     INR  Recent Labs   Lab 12/15/18  0640 12/14/18  0555 12/13/18  0649  12/12/18  0547   INR 2.66* 3.09* 2.73* 2.80*      Hepatic Panel   Lab Results   Component Value Date     12/07/2018     Lab Results   Component Value Date    ALT 18 12/07/2018     No results found for: BILICONJ   Lab Results   Component Value Date    BILITOTAL 5.8 12/07/2018     Lab Results   Component Value Date    ALBUMIN 3.1 12/07/2018     Lab Results   Component Value Date    PROTTOTAL 5.2 12/07/2018      Lab Results   Component Value Date    ALKPHOS 72 12/07/2018       Recent Labs   Lab 12/15/18  0735 12/15/18  0640 12/15/18  0631 12/15/18  0204 12/14/18  2136 12/14/18  1828 12/14/18  1727  12/14/18  0555  12/13/18  0649  12/12/18  0547  12/11/18  1613  12/11/18  0350   GLC  --  145*  --   --   --   --   --   --  119*  --  96  --  96  --  127*  --  101*   *  --  159* 130* 117* 136* 142*   < >  --    < >  --    < >  --    < >  --    < >  --     < > = values in this interval not displayed.         Imaging: reviewed     A/P:   Danielito Chu is a 63 year-old male with a PMHx s/f SHIRLEY, HTN, CHF (LVEF 15%), NIDDM2 who underwent LVAD placement on 12/5/18 with Dr. Silva. Extubated 12/7.     Neuro:   - Neuro intact  - Tylenol and oxycodone, lidocaine patches   - Sleep; melatonin      CV:   - NYHA class IV stage D, HFrEF 2/2 NICM (EF 15%), s/p ICD, HTN  - No arrhythmia, HD stable.  Speed 5400.    - ASA 81 mg, Lipitor   - HTN; lisinopril 2.5 mg BID stopped and started Losartan 25 mg daily 12/14 per Rheumatology recs      Pulm:   - Pulm toilet, IS, activity and deep breathing   - Supplemental O2 PRN to keep sats > 92%  - CPAP PRN at night for PTA SHIRLEY      ID:   - WBC WNL, afebrile, no signs or symptoms of infection      GI / FEN:  - Reg diet, bowel regimen  - Replace Phos per protocol.      Renal / :   - Creatinine WNL, adequate UOP.   - IV fluids 12/12: 250 cc albumin, 250 cc NS   - Diuresis held 12/12 and 12/13, 12/14 giving Lasix 40 mg PO x 1 dose. Weight trending up over last several days, now 13  pounds up from pre op. Na 133. Start Bumex 1 mg IV bid x 2 doses today with KCL.      Heme:   - Hgb stable 8's, Plts WNL     Endo:   - DMT2, not needing insulin   - Gout flare; consulted Rheumatology 12/13.       ~ left knee aspirated and had kenalog/lidocaine injection      ~ anakinra 100 mg subcutaneous injection x 5 days       ~ start allopurinol 200 mg daily (taper up per Rheum)       ~ continue colchicine 0.6 mg daily for 6 months  - 12/15: left knee gout improving      PPX:   - Protonix     Anticoagulation:   - Warfarin for LVAD, goal 2-3. INR 2.66.       Dispo:   - 6C since 12/11   - Needs LVAD teaching  - diureses      Discussed with CVTS Fellow   Staff surgeons have been informed of changes through both  verbal and written communication.           Pratima Krause PA-C  Cardiothoracic Surgery   Pager [phone]  December 15, 2018

## 2018-12-15 NOTE — PROGRESS NOTES
[facility] Rheumatology IP Progress Note    DOS: 12/14/2018    Assessment:  62 yo M with history of DM2, CHF s/p LVAD (12/5/18), now with crystal proven gout (based on L knee artherocentesis 12/13/2018) with evidence of mild erosions on foot xray. Current gout flare is being treated with local steroid injection left knee, anakinra, and colchicine. Synovial fluid and x-ray results were discussed with patient. We reviewed the feet x-rays. Risks and benefits of each medication and treatment have been discussed. Allopurinol has been started inpatient, goal uric acid is <6.    Diagnosis:  1. Crystal proven polyarticular erosive gouty arthritis (knees, bilateral MTPs, midfoot bilaterally), no tophi  2. CHF s/p LVAD  3. DM2    Plan:  1. Continue colchicine 0.6 mg qday now and on discharge  2. Complete 5 day course of subcutaneous anakinra 100 mg qd  3. Continue allopurinol 200 mg daily  4. Uric acid check ~2 weeks after allopurinol start (~12/27)  5. Patient will need followup with rheumatology ~1 month after discharge.     Thank you for involving us in the care of this patient. Rheumatology will continue to follow with you.  Patient staffed with Dr. Nassar.    Indiana Mueller MD, PhD  Rheumatology Fellow  Pager [phone]    Attending Note: I saw and evaluated the patient with Dr. Mueller on 12/14/2018. I agree with the assessment and plan.    Melody Nassar MD    -----------------------------------------------------------------------------------------------------------------------------  Subjective: looking more comfortable in bed. Overall, feels he has had ~30% improvement in pain. Is able to move better though still has not gone for a walk. Left knee particularly feels better. No fevers, chills. He will likely discharge to [facility] rehab Monday.      Current Facility-Administered Medications   Medication     acetaminophen (TYLENOL) tablet 650 mg     allopurinol (ZYLOPRIM) tablet 200 mg     anakinra (KINERET)  subcutaneous injection 100 mg     artificial saliva (BIOTENE DRY MOUTHWASH) liquid 10 mL     aspirin (ASA) chewable tablet 81 mg     colchicine (COLCYRS) tablet 0.6 mg     glucose gel 15-30 g    Or     dextrose 50 % injection 25-50 mL    Or     glucagon injection 1 mg     HYDROmorphone (PF) (DILAUDID) injection 0.3-0.5 mg     insulin aspart (NovoLOG) inj (RAPID ACTING)     insulin aspart (NovoLOG) inj (RAPID ACTING)     Lidocaine (LIDOCARE) 4 % Patch 2 patch    And     lidocaine patch REMOVAL    And     lidocaine patch in PLACE     lidocaine (LMX4) cream     lidocaine 1 % 1 mL     losartan (COZAAR) tablet 25 mg     magnesium sulfate 2 g in water intermittent infusion     magnesium sulfate 4 g in 100 mL sterile water (premade)     melatonin tablet 3 mg     naloxone (NARCAN) injection 0.1-0.4 mg     oxyCODONE (ROXICODONE) tablet 5-10 mg     pantoprazole (PROTONIX) EC tablet 40 mg     polyethylene glycol (MIRALAX/GLYCOLAX) Packet 17 g     potassium chloride (KLOR-CON) Packet 20-40 mEq     potassium chloride 10 mEq in 100 mL intermittent infusion with 10 mg lidocaine     potassium chloride 10 mEq in 100 mL sterile water intermittent infusion (premix)     potassium chloride 20 mEq in 50 mL intermittent infusion     potassium chloride ER (K-DUR/KLOR-CON M) CR tablet 20 mEq     potassium chloride ER (K-DUR/KLOR-CON M) CR tablet 20-40 mEq     Reason beta blocker order not selected     senna-docusate (SENOKOT-S/PERICOLACE) 8.6-50 MG per tablet 2 tablet     sodium chloride (PF) 0.9% PF flush 3 mL     sodium chloride (PF) 0.9% PF flush 3 mL     sodium phosphate 10 mmol in D5W intermittent infusion     sodium phosphate 15 mmol in D5W intermittent infusion     sodium phosphate 20 mmol in D5W intermittent infusion     sodium phosphate 25 mmol in D5W intermittent infusion     traZODone (DESYREL) tablet 100 mg     vitamin B1 (THIAMINE) tablet 100 mg     Warfarin Therapy Reminder (Check START DATE - warfarin may be starting in the  FUTURE)     No Known Allergies      Objective:  Temp:  [96.8  F (36  C)-98.1  F (36.7  C)] 98  F (36.7  C)  Heart Rate:  [103-112] 108  Resp:  [16-18] 18  BP: (65-94)/(32-67) 82/60  SpO2:  [95 %-97 %] 96 %   Wt Readings from Last 4 Encounters:   12/14/18 87.3 kg (192 lb 7.4 oz)   11/26/18 83.5 kg (184 lb)   11/21/18 80.2 kg (176 lb 11.2 oz)   11/13/18 84.8 kg (187 lb)   General: awake, alert, no acute distress  Resp: comfortable on room air  CV: LVAD scar prominent, no alerts from LVAD machine  MSK: notably less pain with palpation of MTPs of feet bilaterally, no pain with mid foot squeeze, no significant change in salmon discoloration over bilateral first MTPs, no warmth/pain of left knee and decreased effusion    Labs: reviewed, notable for intracellular uric acid crystals and no bacteria on gram stain from aspirate. Cultures pending        Imaging:  Foot XR with Small erosion of the left first metatarsophalangeal joint with possible erosion of the first metatarsophalangeal joint on the right.

## 2018-12-16 LAB
ANION GAP SERPL CALCULATED.3IONS-SCNC: 10 MMOL/L (ref 3–14)
BUN SERPL-MCNC: 12 MG/DL (ref 7–30)
CALCIUM SERPL-MCNC: 8.6 MG/DL (ref 8.5–10.1)
CHLORIDE SERPL-SCNC: 105 MMOL/L (ref 94–109)
CO2 SERPL-SCNC: 22 MMOL/L (ref 20–32)
CREAT SERPL-MCNC: 0.54 MG/DL (ref 0.66–1.25)
ERYTHROCYTE [DISTWIDTH] IN BLOOD BY AUTOMATED COUNT: 20.5 % (ref 10–15)
GFR SERPL CREATININE-BSD FRML MDRD: >90 ML/MIN/1.7M2
GLUCOSE BLDC GLUCOMTR-MCNC: 100 MG/DL (ref 70–99)
GLUCOSE SERPL-MCNC: 108 MG/DL (ref 70–99)
HCT VFR BLD AUTO: 28.6 % (ref 40–53)
HGB BLD-MCNC: 8.8 G/DL (ref 13.3–17.7)
INR PPP: 2.04 (ref 0.86–1.14)
MCH RBC QN AUTO: 30 PG (ref 26.5–33)
MCHC RBC AUTO-ENTMCNC: 30.8 G/DL (ref 31.5–36.5)
MCV RBC AUTO: 98 FL (ref 78–100)
PLATELET # BLD AUTO: 533 10E9/L (ref 150–450)
POTASSIUM SERPL-SCNC: 4.4 MMOL/L (ref 3.4–5.3)
RBC # BLD AUTO: 2.93 10E12/L (ref 4.4–5.9)
SODIUM SERPL-SCNC: 136 MMOL/L (ref 133–144)
WBC # BLD AUTO: 10.7 10E9/L (ref 4–11)

## 2018-12-16 PROCEDURE — 00000146 ZZHCL STATISTIC GLUCOSE BY METER IP

## 2018-12-16 PROCEDURE — 25000132 ZZH RX MED GY IP 250 OP 250 PS 637: Performed by: NURSE PRACTITIONER

## 2018-12-16 PROCEDURE — 36592 COLLECT BLOOD FROM PICC: CPT | Performed by: THORACIC SURGERY (CARDIOTHORACIC VASCULAR SURGERY)

## 2018-12-16 PROCEDURE — 80048 BASIC METABOLIC PNL TOTAL CA: CPT | Performed by: THORACIC SURGERY (CARDIOTHORACIC VASCULAR SURGERY)

## 2018-12-16 PROCEDURE — 40000802 ZZH SITE CHECK

## 2018-12-16 PROCEDURE — 21400006 ZZH R&B CCU INTERMEDIATE UMMC

## 2018-12-16 PROCEDURE — 25000132 ZZH RX MED GY IP 250 OP 250 PS 637: Performed by: PHYSICIAN ASSISTANT

## 2018-12-16 PROCEDURE — 40000558 ZZH STATISTIC CVC DRESSING CHANGE

## 2018-12-16 PROCEDURE — 25000132 ZZH RX MED GY IP 250 OP 250 PS 637: Performed by: THORACIC SURGERY (CARDIOTHORACIC VASCULAR SURGERY)

## 2018-12-16 PROCEDURE — 85610 PROTHROMBIN TIME: CPT | Performed by: THORACIC SURGERY (CARDIOTHORACIC VASCULAR SURGERY)

## 2018-12-16 PROCEDURE — 25000132 ZZH RX MED GY IP 250 OP 250 PS 637: Performed by: STUDENT IN AN ORGANIZED HEALTH CARE EDUCATION/TRAINING PROGRAM

## 2018-12-16 PROCEDURE — 25000125 ZZHC RX 250: Performed by: THORACIC SURGERY (CARDIOTHORACIC VASCULAR SURGERY)

## 2018-12-16 PROCEDURE — 85027 COMPLETE CBC AUTOMATED: CPT | Performed by: THORACIC SURGERY (CARDIOTHORACIC VASCULAR SURGERY)

## 2018-12-16 RX ORDER — WARFARIN SODIUM 3 MG/1
3 TABLET ORAL
Status: COMPLETED | OUTPATIENT
Start: 2018-12-16 | End: 2018-12-16

## 2018-12-16 RX ORDER — BUMETANIDE 1 MG/1
1 TABLET ORAL
Status: DISCONTINUED | OUTPATIENT
Start: 2018-12-16 | End: 2018-12-18 | Stop reason: HOSPADM

## 2018-12-16 RX ORDER — POTASSIUM CHLORIDE 750 MG/1
40 TABLET, EXTENDED RELEASE ORAL ONCE
Status: COMPLETED | OUTPATIENT
Start: 2018-12-16 | End: 2018-12-16

## 2018-12-16 RX ORDER — WARFARIN SODIUM 1 MG/1
2 TABLET ORAL
Status: DISCONTINUED | OUTPATIENT
Start: 2018-12-16 | End: 2018-12-16

## 2018-12-16 RX ADMIN — WARFARIN SODIUM 3 MG: 3 TABLET ORAL at 17:35

## 2018-12-16 RX ADMIN — ANAKINRA 100 MG: 100 INJECTION, SOLUTION SUBCUTANEOUS at 16:48

## 2018-12-16 RX ADMIN — ASPIRIN 81 MG CHEWABLE TABLET 81 MG: 81 TABLET CHEWABLE at 07:57

## 2018-12-16 RX ADMIN — BUMETANIDE 1 MG: 1 TABLET ORAL at 10:13

## 2018-12-16 RX ADMIN — LOSARTAN POTASSIUM 25 MG: 25 TABLET, FILM COATED ORAL at 07:58

## 2018-12-16 RX ADMIN — BUMETANIDE 1 MG: 1 TABLET ORAL at 16:44

## 2018-12-16 RX ADMIN — ALLOPURINOL 200 MG: 100 TABLET ORAL at 07:57

## 2018-12-16 RX ADMIN — COLCHICINE 0.6 MG: 0.6 TABLET, FILM COATED ORAL at 07:57

## 2018-12-16 RX ADMIN — PANTOPRAZOLE SODIUM 40 MG: 40 TABLET, DELAYED RELEASE ORAL at 07:57

## 2018-12-16 RX ADMIN — POTASSIUM CHLORIDE 40 MEQ: 750 TABLET, EXTENDED RELEASE ORAL at 10:14

## 2018-12-16 RX ADMIN — THIAMINE HCL TAB 100 MG 100 MG: 100 TAB at 07:58

## 2018-12-16 RX ADMIN — MELATONIN TAB 3 MG 3 MG: 3 TAB at 22:29

## 2018-12-16 ASSESSMENT — ACTIVITIES OF DAILY LIVING (ADL)
ADLS_ACUITY_SCORE: 17
ADLS_ACUITY_SCORE: 15
ADLS_ACUITY_SCORE: 17
ADLS_ACUITY_SCORE: 17

## 2018-12-16 ASSESSMENT — MIFFLIN-ST. JEOR
SCORE: 1636.33
SCORE: 1638.15

## 2018-12-16 NOTE — PROGRESS NOTES
ADVANCED HEART FAILURE PROGRESS NOTE    SUBJECTIVE:  No acute events, feeling better this morning. Walking better now, gout improving.     ROS otherwise negative.    OBJECTIVE:  Vital signs:  Temp: 97.3  F (36.3  C) Temp  Min: 97  F (36.1  C)  Max: 98.4  F (36.9  C)  Heart Rate: 105 Heart Rate  Min: 101  Max: 114  BP: 92/80 Systolic (24hrs), Av , Min:85 , Max:149   Diastolic (24hrs), Av, Min:50, Max:128    Resp: 16 Resp  Min: 16  Max: 20  SpO2: 94 % SpO2  Min: 94 %  Max: 100 %      HeartMate III: 5400 rpm. 4.4 LPM, PI 2.8-3.7      Intake/Output Summary (Last 24 hours) at 2018 0749  Last data filed at 2018 0631  Gross per 24 hour   Intake 870 ml   Output 3455 ml   Net -2585 ml       Vitals:    12/15/18 0401 18 0321 18 0322   Weight: 87.5 kg (193 lb) 83 kg (183 lb) 83.2 kg (183 lb 6.4 oz)       Physical Exam:  General: lying in bed, appears comfortable  Resp: clear bilaterally, breathing comfortably  CV: regular, tachycardic, VAD hum, JVD 10cm, incisions clean  Abdomen: Soft, Non-distended, Non-tender  Extremities: warm and well perfused, mild pitting edema      Medications:    BETA BLOCKER NOT PRESCRIBED       Warfarin Therapy Reminder         Current Facility-Administered Medications   Medication Dose Route Frequency     allopurinol  200 mg Oral Daily     anakinra  100 mg Subcutaneous Q24H     aspirin  81 mg Oral Daily     colchicine  0.6 mg Oral Daily     lidocaine  2 patch Transdermal Q24h    And     lidocaine   Transdermal Q24H    And     lidocaine   Transdermal Q8H     losartan  25 mg Oral Daily     melatonin  3 mg Oral At Bedtime     pantoprazole  40 mg Oral QAM AC     polyethylene glycol  17 g Oral BID     potassium chloride  40 mEq Oral BID     senna-docusate  2 tablet Oral BID     sodium chloride (PF)  3 mL Intracatheter Q8H     vitamin B1  100 mg Oral Daily         Labs:  CMP  Recent Labs   Lab 18  0612 12/15/18  0640  18  0649 18  0547    133   < >  136 137   POTASSIUM 4.4 3.8   < > 4.3 4.0   CHLORIDE 105 101   < > 106 108   CO2 22 22   < > 22 23   BUN 12 12   < > 8 7   CR 0.54* 0.59*   < > 0.61* 0.52*   ASHLEE 8.6 8.2*   < > 7.8* 7.6*   MAG  --   --   --  2.1 1.8   PHOS  --   --   --  2.4* 2.2*   * 145*   < > 96 96    < > = values in this interval not displayed.     BLOOD GASNo lab results found in last 7 days.  CBC  Recent Labs   Lab 12/16/18  0612 12/15/18  0640   WBC 10.7 10.2   HGB 8.8* 8.1*   HCT 28.6* 26.5*   * 440     COAG  Recent Labs   Lab 12/16/18  0612 12/15/18  0640   INR 2.04* 2.66*       ASSESSMENT/PLAN:  Danielito Chu is a 63 year-old male with a PMHx s/f SHIRLEY, HTN, CHF (LVEF 15%), NIDDM2 who underwent HeartMate III LVAD placement on 12/5/18 with Dr. Silva.      #h/o HTN  #CHF s/p Heartmate III LVAD placement 12/5  - agree with rheumatology consult for assistance with gout magagement  - echo shows good RV function, septum is midline.  - on Aspirin 81mg  - INR therapeutic, on warfarin  - speed increased to 5500 RPM  - losartan 25mg  - volume improved after IV bumex yesterday, agree with PO bumex 1 PIB     #Gout  - rheumatology consulted, appreciate assistance  - s/p knee tap on 12/13  - started anakinra, on colchicine     Thank you for allowing me to care for this patient. This has been discussed with the attending physician.      Moisés Rockwell MD  Advanced Heart Failure Fellow  [phone]      Late entry - pt seen and examined on 12/16/18  I have reviewed today's vital signs, notes, medications, labs and imaging. I have also seen and examined the patient and agree with the findings and plan as outlined above.    Ashley Araujo MD  Section Head - Advanced Heart Failure, Transplantation and Mechanical Circulatory Support  Co-Director - [facility]  Associate Professor of Medicine, [facility]

## 2018-12-16 NOTE — PLAN OF CARE
D: S/p non-invasive LVAD HM 3 implant 12/5. Hx. CHF, HTN, SHIRLEY, DM2.  I/A: A&Ox4. VSS on RA. Mild BAJWA. SR/ST 90s-110s. LVAD #s wnl (except PIs intermittently 2.6-2.8, otherwise 3s), no alarms. Denies pain. Adequate uop. SBA. Family at bedside last evening. Appeared to rest comfortably overnight.   P: Discharge pending medical stability and LVAD teaching. Continue to monitor and notify CVTS with questions/concerns.

## 2018-12-16 NOTE — PLAN OF CARE
OT 6C Cx Pt declined, reports he took a walk with his RN earlier and plans to start rehab tomorrow.

## 2018-12-16 NOTE — PLAN OF CARE
D/I/A: Pt here s/p LVAD implantation. VSS, VAD #s WNL. DL dressing changed. Pt ambulated w/ RN but not w/ OT/PT.  P: Continue to monitor.

## 2018-12-16 NOTE — PROGRESS NOTES
12/16/2018   CVTS Progress Note  Attending provider: Andrei Silva, *        S:  No acute issues over night. Patient denies  SOB, CP, fever, chills, sweats. Mood improved today, patient sitting at edge of bed and working on how to put his batteries on.     O:   Vitals:    12/16/18 0321 12/16/18 0322 12/16/18 0631 12/16/18 0743   BP:  90/66  92/80   BP Location:  Left arm  Left arm   Pulse:       Resp:  16 16 16   Temp:  97  F (36.1  C)  97.3  F (36.3  C)   TempSrc:  Axillary  Axillary   SpO2:  100%  94%   Weight: 83 kg (183 lb) 83.2 kg (183 lb 6.4 oz)     Height:         Vitals:    12/15/18 0401 12/16/18 0321 12/16/18 0322   Weight: 87.5 kg (193 lb) 83 kg (183 lb) 83.2 kg (183 lb 6.4 oz)       Intake/Output Summary (Last 24 hours) at 12/15/2018 1150  Last data filed at 12/15/2018 0756  Gross per 24 hour   Intake 800 ml   Output 1400 ml   Net -600 ml       Gen: AxOx3, NAD  CV: RRR,  HR , MAPS 70-80  HM3 speed 5400, PI 3.1, flow 4.5, pw 4.1  Pulm: +CTA, no wheezing, no rhonchi  Abd: soft, NT, ND, +BS, +BM  Ext: trace LE edema  Incision: c/d/i, no erythema, sternal stable  Tubes/drains: all removed     Labs:   BMP  Recent Labs   Lab 12/16/18  0612 12/15/18  0640 12/14/18  0555 12/13/18  0649    133 135 136   POTASSIUM 4.4 3.8 4.4 4.3   CHLORIDE 105 101 104 106   ASHLEE 8.6 8.2* 8.2* 7.8*   CO2 22 22 22 22   BUN 12 12 8 8   CR 0.54* 0.59* 0.48* 0.61*   * 145* 119* 96     CBC  Recent Labs   Lab 12/16/18  0612 12/15/18  0640 12/14/18  0555 12/13/18  0649   WBC 10.7 10.2 9.3 11.2*   RBC 2.93* 2.70* 2.71* 2.74*   HGB 8.8* 8.1* 8.1* 8.3*   HCT 28.6* 26.5* 26.3* 27.0*   MCV 98 98 97 99   MCH 30.0 30.0 29.9 30.3   MCHC 30.8* 30.6* 30.8* 30.7*   RDW 20.5* 20.7* 20.9* 20.8*   * 440 336 262     INR  Recent Labs   Lab 12/16/18  0612 12/15/18  0640 12/14/18  0555 12/13/18  0649   INR 2.04* 2.66* 3.09* 2.73*      Hepatic Panel   Lab Results   Component Value Date     12/07/2018     Lab Results    Component Value Date    ALT 18 12/07/2018     No results found for: BILICONJ   Lab Results   Component Value Date    BILITOTAL 5.8 12/07/2018     Lab Results   Component Value Date    ALBUMIN 3.1 12/07/2018     Lab Results   Component Value Date    PROTTOTAL 5.2 12/07/2018      Lab Results   Component Value Date    ALKPHOS 72 12/07/2018       Recent Labs   Lab 12/16/18  0612 12/15/18  1721 12/15/18  1258 12/15/18  0735 12/15/18  0640 12/15/18  0631 12/15/18  0204 12/14/18  2136  12/14/18  0555  12/13/18  0649  12/12/18  0547  12/11/18  1613   *  --   --   --  145*  --   --   --   --  119*  --  96  --  96  --  127*   BGM  --  150* 134* 122*  --  159* 130* 117*   < >  --    < >  --    < >  --    < >  --     < > = values in this interval not displayed.         Imaging: reviewed     A/P:   Danielito Chu is a 63 year-old male with a PMHx s/f SHIRLEY, HTN, CHF (LVEF 15%), NIDDM2 who underwent LVAD placement on 12/5/18 with Dr. Silva. Extubated 12/7.     Neuro:   - Neuro intact  - Tylenol and oxycodone, lidocaine patches   - Sleep; melatonin      CV:   - NYHA class IV stage D, HFrEF 2/2 NICM (EF 15%), s/p ICD, HTN  - No arrhythmia, HD stable.  Speed 5400.    - ASA 81 mg, Lipitor   - HTN; lisinopril 2.5 mg BID stopped and started Losartan 25 mg daily 12/14 per Rheumatology recs      Pulm:   - Pulm toilet, IS, activity and deep breathing   - Supplemental O2 PRN to keep sats > 92%  - CPAP PRN at night for PTA SHIRLEY      ID:   - WBC WNL, afebrile, no signs or symptoms of infection      GI / FEN:  - Reg diet, bowel regimen  - Replace Phos per protocol.      Renal / :   - Creatinine WNL, adequate UOP.   - IV fluids 12/12: 250 cc albumin, 250 cc NS   - Diuresing well on IV Bumex, great response. Will switch to bumex 1 mg po bid with KCL. Patient feeling better.    - Na improved today to 136 from 133.      Heme:   - Hgb stable 8's, Plts WNL     Endo:   - DMT2, not needing insulin   - Gout flare; consulted Rheumatology  12/13.       ~ left knee aspirated and had kenalog/lidocaine injection      ~ anakinra 100 mg subcutaneous injection x 5 days       ~ start allopurinol 200 mg daily (taper up per Rheum)       ~ continue colchicine 0.6 mg daily for 6 months  - 12/15: left knee gout improving      PPX:   - Protonix     Anticoagulation:   - Warfarin for LVAD, goal 2-3. INR 2.04. Dose higher today.       Dispo:   - 6C since 12/11   - Needs to complete LVAD teaching  - nilton   - will likely not need rehab at discharge.      Discussed with CVTS Fellow   Staff surgeons have been informed of changes through both  verbal and written communication.           Pratima Krause PA-C  Cardiothoracic Surgery   Pager [phone]  December 16, 2018

## 2018-12-16 NOTE — PLAN OF CARE
"  Ventricular Assist Device (Adult)  Signs and Symptoms of Listed Potential Problems Will be Absent, Minimized or Managed (Ventricular Assist Device)  Description  Signs and symptoms of listed potential problems will be absent, minimized or managed by discharge/transition of care (reference Ventricular Assist Device (Adult) CPG).   12/15/2018 1945 - No Change by Rafy Laughlin RN  Flowsheets  Taken 12/15/2018 1945   Problems Assessed (Ventricular Assist Device)  all  Problems Present (VAD)  situational response  Note  D/I/A:  Pt resting between cares and treatments.  A+O x4 and able to make needs known.  VSSA.  LVAD operating within normal limits and free from alarm.  Pt states \"frustrated\" and feeling sad and some regret after having LVAD placed; states, \"I did the surgery for my brother and sister.\"  Encouraged expression of feelings; offered support and encouraged patient to take things slow and allow himself to adjust to the LVAD and recover from surgery.  Pain \"much better\" today.  Voiding in fair amounts.  Tolerating oral intake of nutrition.    P: Continue to monitor status.  Encourage expression of feelings and offer support.     "

## 2018-12-17 ENCOUNTER — APPOINTMENT (OUTPATIENT)
Dept: GENERAL RADIOLOGY | Facility: CLINIC | Age: 63
DRG: 001 | End: 2018-12-17
Attending: PHYSICIAN ASSISTANT
Payer: COMMERCIAL

## 2018-12-17 ENCOUNTER — APPOINTMENT (OUTPATIENT)
Dept: OCCUPATIONAL THERAPY | Facility: CLINIC | Age: 63
DRG: 001 | End: 2018-12-17
Attending: INTERNAL MEDICINE
Payer: COMMERCIAL

## 2018-12-17 LAB
ANION GAP SERPL CALCULATED.3IONS-SCNC: 10 MMOL/L (ref 3–14)
BUN SERPL-MCNC: 12 MG/DL (ref 7–30)
CALCIUM SERPL-MCNC: 8.7 MG/DL (ref 8.5–10.1)
CHLORIDE SERPL-SCNC: 100 MMOL/L (ref 94–109)
CO2 SERPL-SCNC: 24 MMOL/L (ref 20–32)
CREAT SERPL-MCNC: 0.62 MG/DL (ref 0.66–1.25)
ERYTHROCYTE [DISTWIDTH] IN BLOOD BY AUTOMATED COUNT: 20.7 % (ref 10–15)
GFR SERPL CREATININE-BSD FRML MDRD: >90 ML/MIN/1.7M2
GLUCOSE SERPL-MCNC: 96 MG/DL (ref 70–99)
HCT VFR BLD AUTO: 31.8 % (ref 40–53)
HGB BLD-MCNC: 9.6 G/DL (ref 13.3–17.7)
INR PPP: 1.99 (ref 0.86–1.14)
LACTATE BLD-SCNC: 2 MMOL/L (ref 0.7–2)
MCH RBC QN AUTO: 29.6 PG (ref 26.5–33)
MCHC RBC AUTO-ENTMCNC: 30.2 G/DL (ref 31.5–36.5)
MCV RBC AUTO: 98 FL (ref 78–100)
PLATELET # BLD AUTO: 645 10E9/L (ref 150–450)
POTASSIUM SERPL-SCNC: 4 MMOL/L (ref 3.4–5.3)
RBC # BLD AUTO: 3.24 10E12/L (ref 4.4–5.9)
SODIUM SERPL-SCNC: 135 MMOL/L (ref 133–144)
WBC # BLD AUTO: 12.4 10E9/L (ref 4–11)

## 2018-12-17 PROCEDURE — 25000132 ZZH RX MED GY IP 250 OP 250 PS 637: Performed by: THORACIC SURGERY (CARDIOTHORACIC VASCULAR SURGERY)

## 2018-12-17 PROCEDURE — 97530 THERAPEUTIC ACTIVITIES: CPT | Mod: GO

## 2018-12-17 PROCEDURE — 85027 COMPLETE CBC AUTOMATED: CPT | Performed by: THORACIC SURGERY (CARDIOTHORACIC VASCULAR SURGERY)

## 2018-12-17 PROCEDURE — 25000125 ZZHC RX 250: Performed by: PHYSICIAN ASSISTANT

## 2018-12-17 PROCEDURE — 25000132 ZZH RX MED GY IP 250 OP 250 PS 637: Performed by: PHYSICIAN ASSISTANT

## 2018-12-17 PROCEDURE — 25000132 ZZH RX MED GY IP 250 OP 250 PS 637: Performed by: STUDENT IN AN ORGANIZED HEALTH CARE EDUCATION/TRAINING PROGRAM

## 2018-12-17 PROCEDURE — 80048 BASIC METABOLIC PNL TOTAL CA: CPT | Performed by: THORACIC SURGERY (CARDIOTHORACIC VASCULAR SURGERY)

## 2018-12-17 PROCEDURE — 21400006 ZZH R&B CCU INTERMEDIATE UMMC

## 2018-12-17 PROCEDURE — 40000133 ZZH STATISTIC OT WARD VISIT

## 2018-12-17 PROCEDURE — 36592 COLLECT BLOOD FROM PICC: CPT | Performed by: THORACIC SURGERY (CARDIOTHORACIC VASCULAR SURGERY)

## 2018-12-17 PROCEDURE — 25000132 ZZH RX MED GY IP 250 OP 250 PS 637: Performed by: NURSE PRACTITIONER

## 2018-12-17 PROCEDURE — 40000802 ZZH SITE CHECK

## 2018-12-17 PROCEDURE — 83605 ASSAY OF LACTIC ACID: CPT | Performed by: THORACIC SURGERY (CARDIOTHORACIC VASCULAR SURGERY)

## 2018-12-17 PROCEDURE — 85610 PROTHROMBIN TIME: CPT | Performed by: THORACIC SURGERY (CARDIOTHORACIC VASCULAR SURGERY)

## 2018-12-17 PROCEDURE — 40000986 XR CHEST PORT 1 VW

## 2018-12-17 RX ORDER — ALLOPURINOL 100 MG/1
200 TABLET ORAL DAILY
Qty: 90 TABLET | Refills: 0 | Status: SHIPPED | OUTPATIENT
Start: 2018-12-18 | End: 2018-12-26

## 2018-12-17 RX ORDER — LANOLIN ALCOHOL/MO/W.PET/CERES
3 CREAM (GRAM) TOPICAL AT BEDTIME
Qty: 30 TABLET | Refills: 0 | Status: SHIPPED | OUTPATIENT
Start: 2018-12-17 | End: 2019-06-07

## 2018-12-17 RX ORDER — NYSTATIN 100000/ML
500000 SUSPENSION, ORAL (FINAL DOSE FORM) ORAL 4 TIMES DAILY
Status: DISCONTINUED | OUTPATIENT
Start: 2018-12-17 | End: 2018-12-18 | Stop reason: HOSPADM

## 2018-12-17 RX ORDER — LOSARTAN POTASSIUM 25 MG/1
25 TABLET ORAL DAILY
Qty: 30 TABLET | Refills: 0 | Status: SHIPPED | OUTPATIENT
Start: 2018-12-18 | End: 2019-01-15

## 2018-12-17 RX ORDER — NYSTATIN 100000/ML
500000 SUSPENSION, ORAL (FINAL DOSE FORM) ORAL 4 TIMES DAILY
Qty: 180 ML | Refills: 0 | Status: ON HOLD | OUTPATIENT
Start: 2018-12-17 | End: 2019-05-09

## 2018-12-17 RX ORDER — ASPIRIN 81 MG/1
81 TABLET, CHEWABLE ORAL DAILY
Qty: 120 TABLET | Refills: 0 | Status: SHIPPED | OUTPATIENT
Start: 2018-12-18

## 2018-12-17 RX ORDER — ACETAMINOPHEN 325 MG/1
650 TABLET ORAL EVERY 6 HOURS PRN
Qty: 100 TABLET | Refills: 0 | Status: SHIPPED | OUTPATIENT
Start: 2018-12-17 | End: 2019-01-16

## 2018-12-17 RX ORDER — TRAZODONE HYDROCHLORIDE 100 MG/1
100 TABLET ORAL
Qty: 30 TABLET | Refills: 0 | Status: SHIPPED | OUTPATIENT
Start: 2018-12-17 | End: 2019-04-22

## 2018-12-17 RX ORDER — COLCHICINE 0.6 MG/1
0.6 TABLET ORAL DAILY
Qty: 60 TABLET | Refills: 0 | Status: SHIPPED | OUTPATIENT
Start: 2018-12-18 | End: 2018-12-26

## 2018-12-17 RX ORDER — AMOXICILLIN 250 MG
2 CAPSULE ORAL DAILY PRN
Qty: 60 TABLET | Refills: 0 | Status: SHIPPED | OUTPATIENT
Start: 2018-12-17 | End: 2019-05-09

## 2018-12-17 RX ORDER — WARFARIN SODIUM 3 MG/1
3 TABLET ORAL
Status: COMPLETED | OUTPATIENT
Start: 2018-12-17 | End: 2018-12-17

## 2018-12-17 RX ADMIN — WARFARIN SODIUM 3 MG: 3 TABLET ORAL at 17:41

## 2018-12-17 RX ADMIN — NYSTATIN 500000 UNITS: 500000 SUSPENSION ORAL at 19:27

## 2018-12-17 RX ADMIN — POTASSIUM CHLORIDE 20 MEQ: 750 TABLET, EXTENDED RELEASE ORAL at 07:38

## 2018-12-17 RX ADMIN — ALLOPURINOL 200 MG: 100 TABLET ORAL at 07:38

## 2018-12-17 RX ADMIN — PANTOPRAZOLE SODIUM 40 MG: 40 TABLET, DELAYED RELEASE ORAL at 07:38

## 2018-12-17 RX ADMIN — TRAZODONE HYDROCHLORIDE 100 MG: 100 TABLET ORAL at 21:31

## 2018-12-17 RX ADMIN — ASPIRIN 81 MG CHEWABLE TABLET 81 MG: 81 TABLET CHEWABLE at 07:38

## 2018-12-17 RX ADMIN — THIAMINE HCL TAB 100 MG 100 MG: 100 TAB at 07:38

## 2018-12-17 RX ADMIN — COLCHICINE 0.6 MG: 0.6 TABLET, FILM COATED ORAL at 07:38

## 2018-12-17 RX ADMIN — LOSARTAN POTASSIUM 25 MG: 25 TABLET, FILM COATED ORAL at 07:38

## 2018-12-17 RX ADMIN — MELATONIN TAB 3 MG 3 MG: 3 TAB at 21:32

## 2018-12-17 RX ADMIN — ANAKINRA 100 MG: 100 INJECTION, SOLUTION SUBCUTANEOUS at 15:58

## 2018-12-17 RX ADMIN — BUMETANIDE 1 MG: 1 TABLET ORAL at 15:58

## 2018-12-17 RX ADMIN — BUMETANIDE 1 MG: 1 TABLET ORAL at 07:38

## 2018-12-17 RX ADMIN — NYSTATIN 500000 UNITS: 500000 SUSPENSION ORAL at 15:57

## 2018-12-17 ASSESSMENT — ACTIVITIES OF DAILY LIVING (ADL)
ADLS_ACUITY_SCORE: 15

## 2018-12-17 ASSESSMENT — MIFFLIN-ST. JEOR: SCORE: 1612.29

## 2018-12-17 NOTE — PROGRESS NOTES
Critical [facility] hospital Rheumatology IP Progress Note    DOS: 12/17/2018      Assessment:  62 yo M with history of DM2, CHF s/p LVAD (12/5/18), now with crystal proven gout (based on L knee artherocentesis 12/13/2018) with evidence of mild erosions on foot xray. Current gout flare is being treated with local steroid injection left knee, anakinra, and colchicine. Synovial fluid and x-ray results were discussed with patient again, and he was reassured that there is no evidence of significant joint destruction, but that there are changes consistent with chronic gout in the left foot. .     Diagnosis:  1. Crystal proven polyarticular erosive gouty arthritis (knees, bilateral MTPs, midfoot bilaterally), no tophi, s/p 5 day course of subcutaneous anakinra 100 mg qd  2. CHF s/p LVAD  3. DM2    Plan:  1. Continue colchicine 0.6 mg qday now and on discharge  2. Continue allopurinol 200 mg daily  3. Uric acid check ~2 weeks after allopurinol start (~12/27)  4. Patient will need followup with rheumatology (Dr. Nassar) on 12/26/2018 at 12:30 pm after discharge, we will coordinated with cardiology appointment .     Thank you for involving us in the care of this patient. Rheumatology will sign off.     Patient staffed with Dr. Nassar.    Fany Rich MD  PGY-5 Internal Medicine/ Dermatology  [phone]    Attending Note: I saw and evaluated the patient with Dr. Rich. I agree with the assessment and plan. Patient lives 200 miles away, family requested rheumatology appointments to be coordinated with cardiology appointments to limit driving to the cities. Family prefers rheumatology takes care of the gout not PCP. I'll see him on 12/26 at 12:30 pm as add on to my schedule. Our clinic will contact the pt with info. In interim, if pt flares ups, recommend repeat of anakinra 100 mg subcutaneous every day x 3-5 days.    Melody Nassar  MD      ----------------------------------------------------------------------------------------------------------------------  Subjective: looking more comfortable in bed. Overall, feels he states that his pain has completely resolved. The swelling in his left foot has resolved, and he denies any pain with walking.   He is also feeling better from a systemic perspective and plans to discharge in the next day or so.    4-point ROS otherwise negative.     Current Facility-Administered Medications   Medication     acetaminophen (TYLENOL) tablet 650 mg     allopurinol (ZYLOPRIM) tablet 200 mg     anakinra (KINERET) subcutaneous injection 100 mg     artificial saliva (BIOTENE DRY MOUTHWASH) liquid 10 mL     aspirin (ASA) chewable tablet 81 mg     bumetanide (BUMEX) tablet 1 mg     colchicine (COLCYRS) tablet 0.6 mg     glucose gel 15-30 g    Or     dextrose 50 % injection 25-50 mL    Or     glucagon injection 1 mg     HYDROmorphone (PF) (DILAUDID) injection 0.3-0.5 mg     Lidocaine (LIDOCARE) 4 % Patch 2 patch    And     lidocaine patch REMOVAL    And     lidocaine patch in PLACE     lidocaine (LMX4) cream     lidocaine 1 % 1 mL     losartan (COZAAR) tablet 25 mg     magnesium sulfate 2 g in water intermittent infusion     magnesium sulfate 4 g in 100 mL sterile water (premade)     melatonin tablet 3 mg     naloxone (NARCAN) injection 0.1-0.4 mg     nystatin (MYCOSTATIN) suspension 500,000 Units     oxyCODONE (ROXICODONE) tablet 5-10 mg     pantoprazole (PROTONIX) EC tablet 40 mg     polyethylene glycol (MIRALAX/GLYCOLAX) Packet 17 g     potassium chloride (KLOR-CON) Packet 20-40 mEq     potassium chloride 10 mEq in 100 mL intermittent infusion with 10 mg lidocaine     potassium chloride 10 mEq in 100 mL sterile water intermittent infusion (premix)     potassium chloride 20 mEq in 50 mL intermittent infusion     potassium chloride ER (K-DUR/KLOR-CON M) CR tablet 20-40 mEq     Reason beta blocker order not selected      senna-docusate (SENOKOT-S/PERICOLACE) 8.6-50 MG per tablet 2 tablet     sodium chloride (PF) 0.9% PF flush 3 mL     sodium chloride (PF) 0.9% PF flush 3 mL     sodium phosphate 10 mmol in D5W intermittent infusion     sodium phosphate 15 mmol in D5W intermittent infusion     sodium phosphate 20 mmol in D5W intermittent infusion     sodium phosphate 25 mmol in D5W intermittent infusion     traZODone (DESYREL) tablet 100 mg     vitamin B1 (THIAMINE) tablet 100 mg     warfarin (COUMADIN) tablet 3 mg     Warfarin Therapy Reminder (Check START DATE - warfarin may be starting in the FUTURE)     No Known Allergies    Objective:  Temp:  [97.5  F (36.4  C)-98.4  F (36.9  C)] 97.5  F (36.4  C)  Heart Rate:  [103-112] 103  Resp:  [16-17] 16  BP: ()/(49-82) 111/49  SpO2:  [96 %-97 %] 96 %   Wt Readings from Last 4 Encounters:   12/17/18 80.6 kg (177 lb 11.2 oz)   11/26/18 83.5 kg (184 lb)   11/21/18 80.2 kg (176 lb 11.2 oz)   11/13/18 84.8 kg (187 lb)   General: awake, alert, no acute distress  Resp: comfortable on room air  CV: LVAD scar prominent  MSK: No pain with palpation of the MTPs of feet, no erythema, and minimal swelling. No warmth or tenderness over the left knee.     Labs: reviewed    Imaging:  Foot XR with Small erosion of the left first metatarsophalangeal joint with possible erosion of the first metatarsophalangeal joint on the right.

## 2018-12-17 NOTE — PLAN OF CARE
D: admitted 11/28 s/p minimally invasive HM3 implantation, hx SHIRLEY, HTN, CHF (LVEF 15%)     I/A:   Neuro- A&Ox4, SBA with walker  CV- -120s, BP 70-90s/60-70s, MAP 68-75, VAD#s WNL  Pulm- sating well on RA  GI/- regular diet, voiding adequately  LDA- HM 3, R DL PICC  Skin- incisions CDI  Pain- back pain, heating pad     P:  LVAD teaching today. Voiced frustration about continued hospital stay and lifestyle changes with LVAD. Continue to monitor and notify CARDS 2 with any changes or concerns.

## 2018-12-17 NOTE — PROGRESS NOTES
After 5 teaching sessions with Fabiano, 6 with his friend Angel, and 6 with his sister Fani, this is the update:     Checked off on the dsng change: Yanna, and Fani. Fani will be with Fabiano as his 24/7 caregiver for the first 30 days.     Needs to be checked off on dsng change: Angel but Angel will finish his teaching in January with Brenden, Fabiano's brother.    Checked off on the controller change emergency procedure: Fabiano, Yanna, Angel, and Fani.     The Test: Yanna took the test this weekend and is mailing it to me. I will call her to correct the test over the phone. Fani and Fabiano are taking the test on Tuesday night 12/18. Angel will be coming back to the [facility] in January with Fabiano's brother Brenden. They will both finish their training then.     The caregiver plan is for Fani, the sister from [place] to be with Fabiano through March 2019. Then Angel the friend will be living near Fabiano in [place], [place]. Brenden may also be a primary caregiver.  Yanna visits Fabiano on occasion.     Anticipated discontinue to apartment on 12/18 after 2pm     Each learner is on track to completing education or has a plan for completing education.

## 2018-12-17 NOTE — PROGRESS NOTES
ADVANCED HEART FAILURE PROGRESS NOTE    SUBJECTIVE:  No acute events, feeling better this morning. Gout significantly improved.    ROS otherwise negative.    OBJECTIVE:  Vital signs:  Temp: 97.4  F (36.3  C) Temp  Min: 97.4  F (36.3  C)  Max: 98.4  F (36.9  C)  Heart Rate: 103 Heart Rate  Min: 103  Max: 112  BP: 108/60 Systolic (24hrs), Av , Min:76 , Max:111   Diastolic (24hrs), Av, Min:49, Max:82    Resp: 16 Resp  Min: 16  Max: 17  SpO2: 96 % SpO2  Min: 96 %  Max: 97 %    HeartMate III: 5500 rpm. 4.3 LPM, PI 2.8-3.7    Intake/Output Summary (Last 24 hours) at 2018 0749  Last data filed at 2018 0631  Gross per 24 hour   Intake 870 ml   Output 3455 ml   Net -2585 ml       Vitals:    18 0321 18 0322 18 0600   Weight: 83 kg (183 lb) 83.2 kg (183 lb 6.4 oz) 80.6 kg (177 lb 11.2 oz)       Physical Exam:  General: lying in bed, appears comfortable  Resp: clear bilaterally, breathing comfortably  CV: regular, tachycardic, VAD hum, JVD 10cm, incisions clean  Abdomen: Soft, Non-distended, Non-tender  Extremities: warm and well perfused, mild pitting edema    Medications:    BETA BLOCKER NOT PRESCRIBED       Warfarin Therapy Reminder         Current Facility-Administered Medications   Medication Dose Route Frequency     allopurinol  200 mg Oral Daily     anakinra  100 mg Subcutaneous Q24H     aspirin  81 mg Oral Daily     bumetanide  1 mg Oral BID     colchicine  0.6 mg Oral Daily     lidocaine  2 patch Transdermal Q24h    And     lidocaine   Transdermal Q24H    And     lidocaine   Transdermal Q8H     losartan  25 mg Oral Daily     melatonin  3 mg Oral At Bedtime     nystatin  500,000 Units Oral 4x Daily     pantoprazole  40 mg Oral QAM AC     polyethylene glycol  17 g Oral BID     senna-docusate  2 tablet Oral BID     sodium chloride (PF)  3 mL Intracatheter Q8H     vitamin B1  100 mg Oral Daily     warfarin  3 mg Oral ONCE at 18:00         Labs:  [facility]  Recent Labs   Lab 18  1187  12/16/18  0612  12/13/18  0649 12/12/18  0547    136   < > 136 137   POTASSIUM 4.0 4.4   < > 4.3 4.0   CHLORIDE 100 105   < > 106 108   CO2 24 22   < > 22 23   BUN 12 12   < > 8 7   CR 0.62* 0.54*   < > 0.61* 0.52*   ASHLEE 8.7 8.6   < > 7.8* 7.6*   MAG  --   --   --  2.1 1.8   PHOS  --   --   --  2.4* 2.2*   GLC 96 108*   < > 96 96    < > = values in this interval not displayed.     BLOOD GASNo lab results found in last 7 days.  CBC  Recent Labs   Lab 12/17/18  0544 12/16/18  0612   WBC 12.4* 10.7   HGB 9.6* 8.8*   HCT 31.8* 28.6*   * 533*     COAG  Recent Labs   Lab 12/17/18  0544 12/16/18  0612   INR 1.99* 2.04*       ASSESSMENT/PLAN:  Danielito Chu is a 63 year-old male with a PMHx s/f SHIRLEY, HTN, CHF (LVEF 15%), NIDDM2 who underwent HeartMate III LVAD placement on 12/5/18 with Dr. Silva.      #h/o HTN  #CHF s/p Heartmate III LVAD placement 12/5  - agree with rheumatology consult for assistance with gout magagement  - echo shows good RV function, septum is midline.  - on Aspirin 81mg  - INR therapeutic, on warfarin  - speed increased to 5500 RPM  - losartan 25mg  - continue PO bumex 1 BID     #Gout  - rheumatology consulted, appreciate assistance  - s/p knee tap on 12/13  - started anakinra, on colchicine     Thank you for allowing me to care for this patient. This has been discussed with the attending physician.    Jaleel Arreola MD  Cardiology Fellow

## 2018-12-17 NOTE — DISCHARGE SUMMARY
[facility], [place]   Cardiothoracic Surgery Hospital Discharge Summary     Danielito Chu MRN# [id number]   Age: 63 year old YOB: 1955     Admitting Physician:  Andrei Silva MD  Discharge Physician:  PITER Lange  Primary Care Physician:        Bryan Orellana     DATE OF ADMISSION: 11/28/2018     DATE OF DISCHARGE: December 18, 2018          Primary Diagnoses:   1.  Dilated cardiomyopathy, cardiogenic shock. S/P LVAD placement 12/5/18.     2.  Nonischemic cardiomyopathy with HFrEF (EF 15%), NYHA Class IV, Stage D.         Secondary Diagnoses:   1.  Gout flare, resolved  2.  Hx cardiac defibrillator   3.  DM T2   4.  Mitral regurgitation   5.  HTN   6.  SHIRLEY   7.  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  Surgeon: Dr. Andrei Silva and Bhavin Wilcox   1. Minimally invasive placement of HeartMate III left ventricular assist device (intracorporeal left ventricular device).   2. ARCHANA     OPERATIVE FINDINGS:    1. Patient's sternum was of adequate quality.    2. There was severe left atrial dilatation.    3. There was moderate to severe RV dysfunction.    4. There was no tricuspid regurgitation.      INTRAOPERATIVE COMPLICATIONS:  none    PATHOLOGY RESULTS:    Surgical pathology 12/5/18-    Heart, left ventricular apex, resection: myocardial hypertrophy with focal myocytolysis, focal replacement and pericellular fibrosis.     CULTURE RESULTS:    1. No positive blood cultures  2. No positive sputum samples  3. Left knee synovial fluid 12/13/18; many WBCs and + for crystals     DRAINS/TUBES PRESENT AT DISCHARGE:  none    CONSULTS:    1.  PT/OT  2.  Cardiology   3.  Rheumatology   4.  Interventional Radiology   5.  Dental   6.    7.  Palliative   8.  Neuropsychology   9.  Gastroenterology     BRIEF HISTORY OF ILLNESS:  The patient is a 63-year-old male  with severe dilated cardiomyopathy.  A decision made to proceed with HeartMate 3 left ventricular assist device placement.      HOSPITAL COURSE: Danielito Chu is a 63 year old male who on 12/5/18 underwent the above-named procedures.  He tolerated the operation well and postoperatively was admitted to the CVICU on inhaled flolan and moderate pressor requirement and milrinone. Flolan was weaned POD #1-2.  He was extubated on POD # 2 with neuro status intact.  His ICU stay was complicated by some degree of AI/MR/TR and treatment included slow weaning of pressors. His pump speed was increased from 5200 to 5400 on 12/8/18. He used CPAP PRN at night and needed aggressive pulmonary toilet. His anticoagulation included heparin gtt with coumadin not started until 12/9.  Nipride gtt used for HTN. On 12/11, he developed L knee and bilateral ankle pain and colchicine started for treatment of acute gout flare.      He was transferred to the post-surgical telemetry unit on 12/11/18. Rheumatology was consulted for help with gout pain, he had a left knee aspiration that confirmed crystals and was started on allopurinol, anakinra (5 days injections), and received lidocaine/kenalog injection to L knee.  His gout pain was completely resolved about 4 days later.   He continued with VAD training and passed his test on 12/18/18.      Prior to discharge, his pain was controlled well, he was able to perform most ADLs and ambulate without difficulty, and had full return of bowel and bladder function.  On December 18, 2018, he was discharged to the [facility] in stable condition.    Patient discharged on aspirin:  Yes 81 mg  Patient discharged on beta blocker: no New VAD   Patient discharged on ACE Inhibitor/ARB: yes  Losartan            Discharge Disposition:   Discharged to home            Condition on Discharge:   Discharge condition: Stable   Discharge vitals: Blood pressure 97/57, pulse 70, temperature 97.8  F (36.6  C),  "resp. rate 16, height 1.778 m (5' 10\"), weight 80.6 kg (177 lb 11.2 oz), SpO2 98 %.     Code status on discharge: Full Code     Vitals:    12/16/18 0321 12/16/18 0322 12/17/18 0600   Weight: 83 kg (183 lb) 83.2 kg (183 lb 6.4 oz) 80.6 kg (177 lb 11.2 oz)       DAY OF DISCHARGE PHYSICAL EXAM:    MAPs: 57 - 66   Gen:  NAD, conversational  CV: LVAD  Humming, no murmurs, rubs, or gallops  Pulm: CTA, no rhonchi or wheezes  Abd:  Soft, nondistended, NTTP  Ext: no dependent edema  Incision: Clean, dry, intact, no erythema  Chest Tube sites: Dressings clean and dry  Lines: Drive line dressing clean and dry      BMP  Recent Labs   Lab 12/18/18  0618 12/17/18  0544 12/16/18  0612 12/15/18  0640    135 136 133   POTASSIUM 3.6 4.0 4.4 3.8   CHLORIDE 102 100 105 101   ASHLEE 8.3* 8.7 8.6 8.2*   CO2 25 24 22 22   BUN 10 12 12 12   CR 0.62* 0.62* 0.54* 0.59*   * 96 108* 145*     CBC  Recent Labs   Lab 12/18/18  0618 12/17/18  0544 12/16/18  0612 12/15/18  0640   WBC 10.5 12.4* 10.7 10.2   RBC 3.22* 3.24* 2.93* 2.70*   HGB 9.6* 9.6* 8.8* 8.1*   HCT 31.1* 31.8* 28.6* 26.5*   MCV 97 98 98 98   MCH 29.8 29.6 30.0 30.0   MCHC 30.9* 30.2* 30.8* 30.6*   RDW 20.2* 20.7* 20.5* 20.7*   * 645* 533* 440     INR  Recent Labs   Lab 12/18/18  0618 12/17/18  0544 12/16/18  0612 12/15/18  0640   INR 2.01* 1.99* 2.04* 2.66*      Hepatic Panel   Lab Results   Component Value Date     12/07/2018     Lab Results   Component Value Date    ALT 18 12/07/2018     Lab Results   Component Value Date    ALBUMIN 3.1 12/07/2018     Recent Labs   Lab 12/18/18  0618 12/17/18  0544 12/16/18  1155 12/16/18  0612 12/15/18  1721 12/15/18  1258 12/15/18  0735 12/15/18  0640 12/15/18  0631 12/15/18  0204  12/14/18  0555  12/13/18  0649   * 96  --  108*  --   --   --  145*  --   --   --  119*  --  96   BGM  --   --  100*  --  150* 134* 122*  --  159* 130*   < >  --    < >  --     < > = values in this interval not displayed. "     ECHOCARDIOGRAM, 12/11/2018-   HM3 LVAD is present in the expected anatomic position and operates at 5400RPM during the study.  LViDD is 6.45cm. Left ventricular wall thickness is normal. The Ejection  Fraction is estimated at 10-15%. Severe diffuse hypokinesis is present.  The right ventricle cannot be assessed.  There is severe aortic valve calcification. The leaflets appear  to open intermittently and partially. There is mild continuous AI.   Difficult to compare to prior tomograms due to technical limitations. AI appears somewhat better.    CXR 12/14/18-   AP view of the chest. LVAD in unchanged position. Left chest AICD with lead projecting over the right  ventricle. Median sternotomy wires intact. Right arm PICC projects with tip in the low SVC. Left-sided  chest tube in unchanged position.   Pulmonary vascular congestion. Upper abdomen is unremarkable. Degenerative changes of the left  shoulder. Cardiomediastinal silhouette is stably enlarged.                                                                   IMPRESSION:  1. Unchanged position of support devices.  2. Enlarged cardiomediastinal silhouette with vascular congestion.    DISCHARGE INSTRUCTIONS:  You had a sternotomy, avoid lifting anything greater than ten pounds for 6 weeks after surgery and then less than 20 pounds for an additional 6 weeks.  No driving for 4 weeks after surgery or while on pain medication.     Avoid strenuous activities such as bowling, vacuuming, raking, shoveling, golf or tennis for 12 weeks after your surgery. It is okay to resume sex if you feel comfortable in doing so. You may have to try different positions with your partner.     Splint your chest incision by hugging a pillow or bringing your arms across your chest when coughing or sneezing. Avoid pushing off with your arms when getting up for the first month if you have had your sternum opened.    Keep wound clean and dry. No baths, showers, or swimming. Cover chest  tube sites with gauze until they stop draining, then leave open to air. It is not abnormal for chest tube sites to drain yellowish/clear fluid for up to 2-3 weeks after surgery.   Watch for signs of infection: increased redness, tenderness, warmth or any drainage that appears infected (pus like) or is persistent.  Also a temperature > 100.5 F or chills. Call your surgeon or primary care provider's office immediately. Remove any skin glue left on incisions after 10-14 days. This will not affect your incision and can speed up healing.    Exercise is very important in your recovery. Please follow the guidelines set up for you in your cardiac rehab classes at the hospital. If outpatient cardiac rehab was ordered for you, we highly recommend you participate. If you have problems arranging your cardiac rehab, please call [phone].     Avoid sitting for prolonged periods of time, try to walk every hour during the day. If you have a leg incision, elevate your leg often when you are not walking.    Check your weight when you get home from the hospital and continue to check it daily through your recovery for at least a month. If you notice a weight gain of 2-3 pounds in a week, notify your primary care physician, cardiologist or surgeon.    Bowel activity may be slow after surgery. If necessary, you may take an over the counter laxative such as Milk of Magnesia or Miralax. You may have stool softeners prescribed (docusate sodium, Senokot). We recommend using stool softeners while using narcotics for pain (oxycodone/percocet, hydrocodone/vicodin, hydromorphone/dilaudid).      Wean OFF of narcotics (oxycodone, dilaudid, hydrocodone) as soon as possible. You should continue taking acetaminophen as long as you have any surgical pain as the first choice for pain control and add narcotics as necessary for pain to be tolerable.      DO NOT SMOKE.  IF YOU NEED HELP QUITTING, PLEASE TALK WITH YOUR CARDIOLOGIST OR PRIMARY  DOCTOR.    You are on a blood thinner, follow the instructions you were given in the hospital and DO NOT SKIP this medication. Try and take it the same time everyday. Your primary care physician or coumadin clinic will manage the dosing. INR goal is 2-3.    You have an LVAD device, so call your LVAD coordinator with all questions and concerns.  No swimming, showering, or baths. Daily sterile driveline dressing changes as instructed.     REGARDING PRESCRIPTION REFILLS.  If you need a refill on your pain medication contact us to discuss your pain and a possible one time refill.   All other medications will be adjusted, discontinued and re-filled by your primary care physician and/or your cardiologist as they were prior to your surgery. We have given you enough for one to three month with possibly one refill.    POST-OPERATIVE CLINIC VISITS  You will then return to the care of your primary physician and your cardiologist. Your LVAD coordinator will help with all future appointments, lab draws, imaging, etc.   If there is a need to return to see CT Surgery please call our  at [phone].    PRE-ADMISSION MEDICATIONS:    No current facility-administered medications on file prior to encounter.   Current Outpatient Medications on File Prior to Encounter:  artificial saliva (BIOTENE DRY MOUTHWASH) LIQD liquid Swish and spit 10 mLs in mouth 4 times daily as needed for dry mouth   Blood Glucose Monitoring Suppl (BLOOD GLUCOSE MONITOR SYSTEM) w/Device KIT three times a week   omeprazole (PRILOSEC) 20 MG CR capsule Take 1 capsule (20 mg) by mouth every morning (before breakfast)   thiamine 100 MG tablet Take 1 tablet (100 mg) by mouth daily        DISCHARGE MEDICATIONS:    Fabiano Chu   Home Medication Instructions CRYS:[id number]    Printed on:12/18/18 0336   Medication Information                      acetaminophen (TYLENOL) 325 MG tablet  Take 2 tablets (650 mg) by mouth every 6 hours as needed for pain              allopurinol (ZYLOPRIM) 100 MG tablet  Take 2 tablets (200 mg) by mouth daily             artificial saliva (BIOTENE DRY MOUTHWASH) LIQD liquid  Swish and spit 10 mLs in mouth 4 times daily as needed for dry mouth             aspirin (ASA) 81 MG chewable tablet  Take 1 tablet (81 mg) by mouth daily             Blood Glucose Monitoring Suppl (BLOOD GLUCOSE MONITOR SYSTEM) w/Device KIT  three times a week             bumetanide (BUMEX) 1 MG tablet  Take 1 tablet (1 mg) by mouth 2 times daily             colchicine (COLCYRS) 0.6 MG tablet  Take 1 tablet (0.6 mg) by mouth daily             hydrocortisone (CORTAID) 1 % external cream  Apply topically 2 times daily as needed for itching             losartan (COZAAR) 25 MG tablet  Take 1 tablet (25 mg) by mouth daily             melatonin 3 MG tablet  Take 1 tablet (3 mg) by mouth At Bedtime             nystatin (MYCOSTATIN) 865681 UNIT/ML suspension  Take 5 mLs (500,000 Units) by mouth 4 times daily for 9 days             omeprazole (PRILOSEC) 20 MG CR capsule  Take 1 capsule (20 mg) by mouth every morning (before breakfast)             potassium chloride ER (K-DUR/KLOR-CON M) 20 MEQ CR tablet  Take 1 tablet (20 mEq) by mouth 2 times daily             senna-docusate (SENOKOT-S/PERICOLACE) 8.6-50 MG tablet  Take 2 tablets by mouth daily as needed for constipation             thiamine 100 MG tablet  Take 1 tablet (100 mg) by mouth daily             traZODone (DESYREL) 100 MG tablet  Take 1 tablet (100 mg) by mouth nightly as needed for sleep             warfarin (COUMADIN) 2 MG tablet  Take 3 mg PO daily at 6 pm until next INR check, then dose per INR goal 2-3               CC:  Bryan Orellana, Fany Worley MD, [facility]   Cardiothoracic Surgery  Office phone: [phone]  Office fax: [phone]

## 2018-12-17 NOTE — PLAN OF CARE
OT6C:  Discharge Planner OT   Patient plan for discharge: home with assistance and op cr  Current status: Pt initially agreeable to therapy at this time. Pt supine<>sit EOB SBA, once seated EOB upon Th initiating putting holster on pt declining further participation in therapy 2/2 son calling and telling pt that phone password had been changed. Pt adamantly declining further participation in therapy session and initiating call to RN. VSS on RA.   Barriers to return to prior living situation: medical status, decreased strength and endurance limiting IND with I/ADLs  Recommendations for discharge: home with assistance and OP CR vs TCU  Rationale for recommendations: Pt seen ambulating in hallway SBA + FWW, however have been unable to assess pt IND with therapy goals 2/2 pt refusal to participate in therapy sessions, Anticipate pt may progress to home with OP CR with participation in therapy.       Entered by: Donna Ames 12/17/2018 12:58 PM

## 2018-12-17 NOTE — PLAN OF CARE
Ventricular Assist Device (Adult)  Signs and Symptoms of Listed Potential Problems Will be Absent, Minimized or Managed (Ventricular Assist Device)  Description  Signs and symptoms of listed potential problems will be absent, minimized or managed by discharge/transition of care (reference Ventricular Assist Device (Adult) CPG).   12/16/2018 2137 - No Change by Rafy Laughlin RN  Flowsheets  Taken 12/16/2018 2135   Problems Assessed (Ventricular Assist Device)  all  Taken 12/15/2018 1945   Problems Present (VAD)  situational response  Note  D/I/A: Pt up ad moshe with SBA of one and use of walker.  Activity encouraged as tolerated.  A+O x4 and able to make needs known.  Denies pain or sob.  VSSA.  LVAD operating within normal limits and free from alarm.  Pleasant and talkative.  S/O at bedside and supportive.  Patient more upbeat today.  P: Continue to monitor status.  Encourage activity.

## 2018-12-17 NOTE — PROGRESS NOTES
CVTS Daily Note  12/17/2018  Attending: Andrei Silva, *    S:   No overnight events.  No gout pain. Wants to discharge.   Pt seen at bedside resting comfortably.    Does complain of food tastes changing, otherwise no acute complaints.      Denies F/C/Sweats.  No CP, SOB, or calf pain.    Tolerating diet.  + BM.  + Flatus.    Ambulated well with less assistance, using walker for balance only.   Pain level tolerable. Plan as per Neuro section below.     O:   Vitals:    12/16/18 2200 12/17/18 0300 12/17/18 0600 12/17/18 0724   BP: (!) 88/74 (!) 76/64  111/49   BP Location: Left arm Left arm     Pulse:       Resp: 17 17 16   Temp: 97.9  F (36.6  C) 98.1  F (36.7  C)  97.5  F (36.4  C)   TempSrc: Oral Oral  Oral   SpO2: 97% 96%     Weight:   80.6 kg (177 lb 11.2 oz)    Height:         Vitals:    12/16/18 0321 12/16/18 0322 12/17/18 0600   Weight: 83 kg (183 lb) 83.2 kg (183 lb 6.4 oz) 80.6 kg (177 lb 11.2 oz)     - 2 kg since admit and trending down      Intake/Output Summary (Last 24 hours) at 12/17/2018 0759  Last data filed at 12/17/2018 0539  Gross per 24 hour   Intake 1030 ml   Output 3250 ml   Net -2220 ml       MAPs: 58 - 84   Gen: AAO x 3, pleasant, NAD  CV: LVAD humming, no murmurs, rubs, or gallops.   Pulm: CTA, no rhonchi or wheezes  Abd: soft, non-tender, no guarding  Ext: no peripheral edema  Incision: clean, dry, intact, no erythema  Chest Tube sites: dressings clean and dry  Lines: driveline dressing clean and dry   LVAD: speed 5500, flow 4.6 - 4.7, PI 2.8 - 3, power 4.1 - 4.5       Labs:  BMP  Recent Labs   Lab 12/17/18  0544 12/16/18  0612 12/15/18  0640 12/14/18  0555    136 133 135   POTASSIUM 4.0 4.4 3.8 4.4   CHLORIDE 100 105 101 104   ASHLEE 8.7 8.6 8.2* 8.2*   CO2 24 22 22 22   BUN 12 12 12 8   CR 0.62* 0.54* 0.59* 0.48*   GLC 96 108* 145* 119*     CBC  Recent Labs   Lab 12/17/18  0544 12/16/18  0612 12/15/18  0640 12/14/18  0555   WBC 12.4* 10.7 10.2 9.3   RBC 3.24* 2.93* 2.70* 2.71*    HGB 9.6* 8.8* 8.1* 8.1*   HCT 31.8* 28.6* 26.5* 26.3*   MCV 98 98 98 97   MCH 29.6 30.0 30.0 29.9   MCHC 30.2* 30.8* 30.6* 30.8*   RDW 20.7* 20.5* 20.7* 20.9*   * 533* 440 336     INR  Recent Labs   Lab 12/17/18  0544 12/16/18  0612 12/15/18  0640 12/14/18  0555   INR 1.99* 2.04* 2.66* 3.09*      Hepatic Panel   Lab Results   Component Value Date     12/07/2018     Lab Results   Component Value Date    ALT 18 12/07/2018     Lab Results   Component Value Date    ALBUMIN 3.1 12/07/2018     GLUCOSE:   Recent Labs   Lab 12/17/18  0544 12/16/18  1155 12/16/18  0612 12/15/18  1721 12/15/18  1258 12/15/18  0735 12/15/18  0640 12/15/18  0631 12/15/18  0204  12/14/18  0555  12/13/18  0649  12/12/18  0547   GLC 96  --  108*  --   --   --  145*  --   --   --  119*  --  96  --  96   BGM  --  100*  --  150* 134* 122*  --  159* 130*   < >  --    < >  --    < >  --     < > = values in this interval not displayed.       Imaging:  reviewed recent imaging      A/P:   Danielito Chu is a 63 year-old male with a PMHx s/f SHIRLEY, HTN, CHF (LVEF 15%), NIDDM2 who underwent LVAD placement on 12/5/18 with Dr. Silva. Extubated 12/7.     Neuro:   - Neuro intact  - Tylenol and oxycodone, lidocaine patches   - Sleep; melatonin      CV:   - NYHA class IV stage D, HFrEF 2/2 NICM (EF 15%), s/p ICD, HTN  - No arrhythmia, HD stable.  Speed 5500.    - ASA 81 mg, Lipitor   - HTN; lisinopril 2.5 mg BID stopped and started Losartan 25 mg daily 12/14 per Rheumatology recs      Pulm:   - Pulm toilet, IS, activity and deep breathing   - Supplemental O2 PRN to keep sats > 92%  - CPAP PRN at night for PTA SHIRLEY      ID:   - WBC WNL, afebrile, no signs or symptoms of infection   - Oral candidiasis: taste changes and mouth burning, started Nystatin 12/17     GI / FEN:  - Reg diet, bowel regimen  - Replace Phos per protocol.      Renal / :   - Creatinine WNL, adequate UOP.   - IV fluids 12/12: 250 cc albumin, 250 cc NS   - Diuresed well on IV  Bumex, great response. Switched to bumex 1 mg PO bid with KCL 12/16. Patient feeling better. Stable weights.   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 Will have him perform urethral dilations weekly (only 4 cm of insertion needed).         Heme:   - Hgb stable 8's, Plts WNL     Endo:   - DMT2, not needing insulin   - Gout flare; consulted Rheumatology 12/13. Pain has now resolved      ~ left knee aspirated and had kenalog/lidocaine injection      ~ anakinra 100 mg subcutaneous injection x 5 days (ends 12/17)       ~ start allopurinol 200 mg daily (taper up per Rheum)       ~ continue colchicine 0.6 mg daily for 6 months      ~ Uric acid goal < 6, recheck level around 12/27       PPX:   - Protonix     Anticoagulation:   - Warfarin for LVAD, goal 2-3. INR 1.99.        Dispo:   - 6C since 12/11   - Needs to complete LVAD teaching  - Diuresis   - Will likely not need rehab at discharge.       Discussed with CVTS Fellow   Staff surgeons have been informed of changes through both  verbal and written communication.      Derick Castellon PA-C  Cardiothoracic Surgery  Pager [phone]    7:59 AM   December 17, 2018

## 2018-12-18 ENCOUNTER — APPOINTMENT (OUTPATIENT)
Dept: OCCUPATIONAL THERAPY | Facility: CLINIC | Age: 63
DRG: 001 | End: 2018-12-18
Attending: INTERNAL MEDICINE
Payer: COMMERCIAL

## 2018-12-18 ENCOUNTER — TELEPHONE (OUTPATIENT)
Dept: RHEUMATOLOGY | Facility: CLINIC | Age: 63
End: 2018-12-18

## 2018-12-18 ENCOUNTER — PATIENT OUTREACH (OUTPATIENT)
Dept: CARE COORDINATION | Facility: CLINIC | Age: 63
End: 2018-12-18

## 2018-12-18 ENCOUNTER — APPOINTMENT (OUTPATIENT)
Dept: PHYSICAL THERAPY | Facility: CLINIC | Age: 63
DRG: 001 | End: 2018-12-18
Attending: INTERNAL MEDICINE
Payer: COMMERCIAL

## 2018-12-18 VITALS
RESPIRATION RATE: 16 BRPM | TEMPERATURE: 97.8 F | BODY MASS INDEX: 25.44 KG/M2 | DIASTOLIC BLOOD PRESSURE: 57 MMHG | OXYGEN SATURATION: 98 % | HEART RATE: 70 BPM | WEIGHT: 177.7 LBS | HEIGHT: 70 IN | SYSTOLIC BLOOD PRESSURE: 97 MMHG

## 2018-12-18 LAB
ANION GAP SERPL CALCULATED.3IONS-SCNC: 9 MMOL/L (ref 3–14)
BACTERIA SPEC CULT: NO GROWTH
BUN SERPL-MCNC: 10 MG/DL (ref 7–30)
CALCIUM SERPL-MCNC: 8.3 MG/DL (ref 8.5–10.1)
CHLORIDE SERPL-SCNC: 102 MMOL/L (ref 94–109)
CO2 SERPL-SCNC: 25 MMOL/L (ref 20–32)
CREAT SERPL-MCNC: 0.62 MG/DL (ref 0.66–1.25)
ERYTHROCYTE [DISTWIDTH] IN BLOOD BY AUTOMATED COUNT: 20.2 % (ref 10–15)
GFR SERPL CREATININE-BSD FRML MDRD: >90 ML/MIN/1.7M2
GLUCOSE SERPL-MCNC: 114 MG/DL (ref 70–99)
HCT VFR BLD AUTO: 31.1 % (ref 40–53)
HGB BLD-MCNC: 9.6 G/DL (ref 13.3–17.7)
INR PPP: 2.01 (ref 0.86–1.14)
MCH RBC QN AUTO: 29.8 PG (ref 26.5–33)
MCHC RBC AUTO-ENTMCNC: 30.9 G/DL (ref 31.5–36.5)
MCV RBC AUTO: 97 FL (ref 78–100)
PLATELET # BLD AUTO: 565 10E9/L (ref 150–450)
POTASSIUM SERPL-SCNC: 3.6 MMOL/L (ref 3.4–5.3)
RBC # BLD AUTO: 3.22 10E12/L (ref 4.4–5.9)
SODIUM SERPL-SCNC: 136 MMOL/L (ref 133–144)
SPECIMEN SOURCE: NORMAL
WBC # BLD AUTO: 10.5 10E9/L (ref 4–11)

## 2018-12-18 PROCEDURE — 25000132 ZZH RX MED GY IP 250 OP 250 PS 637: Performed by: PHYSICIAN ASSISTANT

## 2018-12-18 PROCEDURE — 25000132 ZZH RX MED GY IP 250 OP 250 PS 637: Performed by: STUDENT IN AN ORGANIZED HEALTH CARE EDUCATION/TRAINING PROGRAM

## 2018-12-18 PROCEDURE — 97530 THERAPEUTIC ACTIVITIES: CPT | Mod: GP | Performed by: STUDENT IN AN ORGANIZED HEALTH CARE EDUCATION/TRAINING PROGRAM

## 2018-12-18 PROCEDURE — 25000132 ZZH RX MED GY IP 250 OP 250 PS 637: Performed by: NURSE PRACTITIONER

## 2018-12-18 PROCEDURE — 40000133 ZZH STATISTIC OT WARD VISIT

## 2018-12-18 PROCEDURE — 80048 BASIC METABOLIC PNL TOTAL CA: CPT | Performed by: THORACIC SURGERY (CARDIOTHORACIC VASCULAR SURGERY)

## 2018-12-18 PROCEDURE — 25000132 ZZH RX MED GY IP 250 OP 250 PS 637: Performed by: THORACIC SURGERY (CARDIOTHORACIC VASCULAR SURGERY)

## 2018-12-18 PROCEDURE — 40000802 ZZH SITE CHECK

## 2018-12-18 PROCEDURE — 85610 PROTHROMBIN TIME: CPT | Performed by: THORACIC SURGERY (CARDIOTHORACIC VASCULAR SURGERY)

## 2018-12-18 PROCEDURE — 40000193 ZZH STATISTIC PT WARD VISIT: Performed by: STUDENT IN AN ORGANIZED HEALTH CARE EDUCATION/TRAINING PROGRAM

## 2018-12-18 PROCEDURE — 97535 SELF CARE MNGMENT TRAINING: CPT | Mod: GO

## 2018-12-18 PROCEDURE — 36592 COLLECT BLOOD FROM PICC: CPT | Performed by: THORACIC SURGERY (CARDIOTHORACIC VASCULAR SURGERY)

## 2018-12-18 PROCEDURE — 85027 COMPLETE CBC AUTOMATED: CPT | Performed by: THORACIC SURGERY (CARDIOTHORACIC VASCULAR SURGERY)

## 2018-12-18 RX ORDER — POTASSIUM CHLORIDE 1500 MG/1
20 TABLET, EXTENDED RELEASE ORAL 2 TIMES DAILY
Qty: 90 TABLET | Refills: 0 | Status: SHIPPED | OUTPATIENT
Start: 2018-12-18 | End: 2018-12-26

## 2018-12-18 RX ORDER — POTASSIUM CHLORIDE 750 MG/1
20 TABLET, EXTENDED RELEASE ORAL 2 TIMES DAILY
Status: DISCONTINUED | OUTPATIENT
Start: 2018-12-18 | End: 2018-12-18 | Stop reason: HOSPADM

## 2018-12-18 RX ORDER — WARFARIN SODIUM 3 MG/1
3 TABLET ORAL
Status: DISCONTINUED | OUTPATIENT
Start: 2018-12-18 | End: 2018-12-18 | Stop reason: HOSPADM

## 2018-12-18 RX ORDER — WARFARIN SODIUM 2 MG/1
TABLET ORAL
Qty: 150 TABLET | Refills: 1 | Status: SHIPPED | OUTPATIENT
Start: 2018-12-18 | End: 2019-06-26

## 2018-12-18 RX ORDER — POTASSIUM CHLORIDE 750 MG/1
20 TABLET, EXTENDED RELEASE ORAL 2 TIMES DAILY
Status: DISCONTINUED | OUTPATIENT
Start: 2018-12-18 | End: 2018-12-18

## 2018-12-18 RX ORDER — BENZOCAINE/MENTHOL 6 MG-10 MG
LOZENGE MUCOUS MEMBRANE 2 TIMES DAILY PRN
Status: DISCONTINUED | OUTPATIENT
Start: 2018-12-18 | End: 2018-12-18 | Stop reason: HOSPADM

## 2018-12-18 RX ORDER — BENZOCAINE/MENTHOL 6 MG-10 MG
LOZENGE MUCOUS MEMBRANE 2 TIMES DAILY PRN
Qty: 1 G | Refills: 0 | Status: SHIPPED | OUTPATIENT
Start: 2018-12-18 | End: 2019-04-22

## 2018-12-18 RX ORDER — BUMETANIDE 1 MG/1
1 TABLET ORAL
Qty: 60 TABLET | Refills: 0 | Status: SHIPPED | OUTPATIENT
Start: 2018-12-18 | End: 2018-12-26

## 2018-12-18 RX ADMIN — POTASSIUM CHLORIDE 20 MEQ: 750 TABLET, EXTENDED RELEASE ORAL at 14:02

## 2018-12-18 RX ADMIN — ASPIRIN 81 MG CHEWABLE TABLET 81 MG: 81 TABLET CHEWABLE at 08:03

## 2018-12-18 RX ADMIN — PANTOPRAZOLE SODIUM 40 MG: 40 TABLET, DELAYED RELEASE ORAL at 08:03

## 2018-12-18 RX ADMIN — POTASSIUM CHLORIDE 20 MEQ: 750 TABLET, EXTENDED RELEASE ORAL at 09:55

## 2018-12-18 RX ADMIN — NYSTATIN 500000 UNITS: 500000 SUSPENSION ORAL at 08:04

## 2018-12-18 RX ADMIN — BUMETANIDE 1 MG: 1 TABLET ORAL at 08:03

## 2018-12-18 RX ADMIN — NYSTATIN 500000 UNITS: 500000 SUSPENSION ORAL at 11:32

## 2018-12-18 RX ADMIN — ALLOPURINOL 200 MG: 100 TABLET ORAL at 08:03

## 2018-12-18 RX ADMIN — LOSARTAN POTASSIUM 25 MG: 25 TABLET, FILM COATED ORAL at 08:03

## 2018-12-18 RX ADMIN — THIAMINE HCL TAB 100 MG 100 MG: 100 TAB at 08:03

## 2018-12-18 RX ADMIN — COLCHICINE 0.6 MG: 0.6 TABLET, FILM COATED ORAL at 08:03

## 2018-12-18 ASSESSMENT — ACTIVITIES OF DAILY LIVING (ADL)
ADLS_ACUITY_SCORE: 13
ADLS_ACUITY_SCORE: 15

## 2018-12-18 NOTE — PLAN OF CARE
Physical Therapy Discharge Summary    Reason for therapy discharge:    Discharged to Home with OP CR     Progress towards therapy goal(s). See goals on Care Plan in [facility] electronic health record for goal details.  Goals partially met.  Barriers to achieving goals:   discharge from facility.    Therapy recommendation(s):    Continued therapy is recommended.  Rationale/Recommendations:  Recommend discharge to home with OP CR. .

## 2018-12-18 NOTE — PROGRESS NOTES
CLINICAL NUTRITION SERVICES    Reason for Assessment:  Nutrition education regarding meal planning post-op LVAD placement.    Diet History:  Pt received nutrition education pre-op regarding post-op LVAD placement in the past. Pt/wife agreeable to a review of nutrition education.      Nutrition Diagnosis:  No nutrition education dx.    Interventions:  Nutrition Education  1. Importance of adequate oral intake post-op, especially for 6-8 weeks, was discussed. Encouraged oral intake (small, frequent meals).   2. Provided handout regarding the amounts of protein in various food items from the Nutrition Care Manual. Wrote his recommended daily protein goal on his handout.     Goals:   1. Patient will verbalize the importance of eating small, frequent meals.    2. Pt will verbalize at least three high protein foods.      Follow-up:    Patient to ask any further nutrition-related questions before discharge. In addition, pt may request outpatient RD appointment.    Rachel Bradshaw, MS, RD, LD, [facility]   6C Pgr:  [phone]

## 2018-12-18 NOTE — TELEPHONE ENCOUNTER
----- Message from Indiana Mueller MD sent at 12/14/2018  6:25 PM CST -----  Regarding: hospital followup  Hi Sadie, this patient will likely discharge this Monday or Tuesday (12/17-12/18) to rehab at [facility]. Can you please arrange for followup with me in clinic 1 month after discharge? Thanks! [place]

## 2018-12-18 NOTE — TELEPHONE ENCOUNTER
----- Message from Melody Nassar MD sent at 12/17/2018 11:16 PM CST -----  Regarding: appointment 12/26  Jerry Noel,    This pt lives 200 miles away and tries to coordinate appointments. He is currently inpatient but has an upcoming appointment with cardiology on 12/26 around 1 pm. I will see him at 12:30 pm as add on (new pt, gout, f/u hospitalization). Could you please help to set that up and send info to pt?    Thanks

## 2018-12-18 NOTE — PLAN OF CARE
Discharge Planner OT   Patient plan for discharge: Home with OP CR  Current status: Pt supine inclined in bed upon arrival. Therapist educated pt on and reviewed sternal precautions and safety with functional transfers. Pt required CGA and vc's for transfer supine<->seated EOB and for sit<->standing. Educated pt on appropriate positioning and donning/doffing of LVAD controller and battery holster/vest. Pt upset by design of vest. Pt fatigued with activity in and OOB. Pt with limited tolerance today.    Barriers to return to prior living situation: Decreased strength/activity tolerance, post surgical precautions. Fatigue.   Recommendations for discharge: Home with A and OP CR.   Rationale for recommendations: Pt will benefit from continued therapy to address barriers above and to maximize function.        Entered by: Trev Parr 12/18/2018 5:18 PM

## 2018-12-18 NOTE — DISCHARGE INSTRUCTIONS
[facility]      AFTER YOU GO HOME FROM YOUR HEART SURGERY    You had a sternotomy, avoid lifting anything greater than ten pounds for 6 weeks after surgery and then less than 20 pounds for an additional 6 weeks.  No driving for 4 weeks after surgery or while on pain medication.     Avoid strenuous activities such as bowling, vacuuming, raking, shoveling, golf or tennis for 12 weeks after your surgery. It is okay to resume sex if you feel comfortable in doing so. You may have to try different positions with your partner.     Splint your chest incision by hugging a pillow or bringing your arms across your chest when coughing or sneezing. Avoid pushing off with your arms when getting up for the first month if you have had your sternum opened.    Keep wound clean and dry. No baths, showers, or swimming. Cover chest tube sites with gauze until they stop draining, then leave open to air. It is not abnormal for chest tube sites to drain yellowish/clear fluid for up to 2-3 weeks after surgery.   Watch for signs of infection: increased redness, tenderness, warmth or any drainage that appears infected (pus like) or is persistent.  Also a temperature > 100.5 F or chills. Call your surgeon or primary care provider's office immediately. Remove any skin glue left on incisions after 10-14 days. This will not affect your incision and can speed up healing.    Exercise is very important in your recovery. Please follow the guidelines set up for you in your cardiac rehab classes at the hospital. If outpatient cardiac rehab was ordered for you, we highly recommend you participate. If you have problems arranging your cardiac rehab, please call [phone].     Avoid sitting for prolonged periods of time, try to walk every hour during the day. If you have a leg incision, elevate your leg often when you are not walking.    Check your weight when you get home from the hospital and continue to check it daily  through your recovery for at least a month. If you notice a weight gain of 2-3 pounds in a week, notify your primary care physician, cardiologist or surgeon.    Bowel activity may be slow after surgery. If necessary, you may take an over the counter laxative such as Milk of Magnesia or Miralax. You may have stool softeners prescribed (docusate sodium, Senokot). We recommend using stool softeners while using narcotics for pain (oxycodone/percocet, hydrocodone/vicodin, hydromorphone/dilaudid).      Wean OFF of narcotics (oxycodone, dilaudid, hydrocodone) as soon as possible. You should continue taking acetaminophen as long as you have any surgical pain as the first choice for pain control and add narcotics as necessary for pain to be tolerable.      DO NOT SMOKE.  IF YOU NEED HELP QUITTING, PLEASE TALK WITH YOUR CARDIOLOGIST OR PRIMARY DOCTOR.    You are on a blood thinner, follow the instructions you were given in the hospital and DO NOT SKIP this medication. Try and take it the same time everyday. Your primary care physician or coumadin clinic will manage the dosing. INR goal is 2-3.    You have an LVAD device, so call your LVAD coordinator with all questions and concerns.  No swimming, showering, or baths. Daily sterile driveline dressing changes as instructed.     REGARDING PRESCRIPTION REFILLS.  If you need a refill on your pain medication contact us to discuss your pain and a possible one time refill.   All other medications will be adjusted, discontinued and re-filled by your primary care physician and/or your cardiologist as they were prior to your surgery. We have given you enough for one to three month with possibly one refill.    POST-OPERATIVE CLINIC VISITS  You will then return to the care of your primary physician and your cardiologist. Your LVAD coordinator will help with all future appointments, lab draws, imaging, etc.   Follow up with Rheumatology (Dr. Nassar) on 12/26/2018 at 12:30 pm after  discharge.     If there is a need to return to see CT Surgery please call our  at [phone].    SURGICAL QUESTIONS  Please call Lynette Stokes or Fern Townsend with surgical recovery and medication questions, the phone number is listed below.  They can assist you with your needs and contact other surgery care team members as indicated.    On weekends or after hours, please call [phone] and ask the  to   page the Cardiothoracic Surgery fellow on call.      Thank you,    Your Cardiothoracic Surgery Team  Lynette Stokes RN Care Coordinator-  [phone]   Fern Townsend RN Care Coordinator-  [phone]  Pratima WATKINSC

## 2018-12-18 NOTE — PROGRESS NOTES
Care Coordinator - Discharge Planning    Admission Date/Time:  11/28/2018  Attending MD:  Andrei Silva   Data  Date of initial CC assessment:    Chart reviewed, discussed with interdisciplinary team.   Patient was admitted for:   1. Acute on chronic systolic heart failure (H)    2. Nocturnal oxygen desaturation    3. LVAD (left ventricular assist device) present (H)    Assessment   Concerns with insurance coverage for discharge needs: None stated by pt.  Current Living Situation: Patient will be staying locally at the [facility] with his sisterFani.   Support System: Supportive and Involved sisterFani.   Services Involved: [facility] order placed.   Transportation at Discharge: Family or friend will provide  Transportation to Medical Appointments:    - Name of caregiver: Fani    Coordination of Care and Referrals: Provided patient/family with options for outpt INR and Warfarin monitoring with the [facility] and INR lab draw.  Per PA, pt will need INR lab draw on 12/20/18; pt is in agreement with this plan.   OT recommending OPCR; pt is in agreement with this plan.   Intervention:      Arrangements made with the [facility] (Ph: [phone] Fax: [phone]) for INR and Warfarin Monitoring (Goal= 2-3). Indication for Anticoagulation: LVAD. Dr. Lorena Watkins to follow.     Appointment made at the [facility] (Ph: [phone]) for INR lab draw on 12/20/18 at 10 AM.    Plan  Anticipated Discharge Date:  12/18/18  Anticipated Discharge Plan:  Discharge locally to [facility] with outpt f/u.     CTS Handoff completed:  YES    GILBERT SOTO, RN BSN  Care Coordinator Unit   [phone]

## 2018-12-18 NOTE — PROGRESS NOTES
DISCHARGE:  Discharged to: Home  Via: Automobile  Accompanied by: Family  Discharge Instructions: Diet, activity, medications, follow up appointments, when to call the MD, and what to watchout for (i.e. s/s of infection, increasing SOB, palpitations, chest pain, etc.)  Prescriptions: To be filled by [facility] per patient's request; medication list reviewed & sent with patient  Follow Up Appointments: arranged; information given  Belongings: All sent with patient (including LVAD backup controller, batteries, and charge station)  IV: PICC removed  Telemetry: off    Patient exhibits understanding of above discharge instructions; all questions answered.  Discharge Paperwork: faxed

## 2018-12-18 NOTE — TELEPHONE ENCOUNTER
Call placed to patient regarding the referral, but there was no answer and unable to leave a message as mailbox is full.  Sadie Rodnye CMA  12/18/2018 9:46 AM

## 2018-12-18 NOTE — PROGRESS NOTES
After 6 teaching sessions with Jaycee, 6 with his friend Angel, and 7 with his sister Fani, BERTHA, JAYCEE, and FANI have completed education.     Checked off on the dsng change: Bertha and Fani.      Needs to be checked off on dsng change: Angel but Angel will finish his teaching in January with Brenden, Jaycee's brother, and possible also a nephew.     Checked off on the controller change emergency procedure: Jaycee, Bertha, Angel, and Fani.     The Test: Bertha, Jaycee, and Fani passed the test. I will call Bertha in [place] to review her test with her. Angel will be coming back to the [facility] in January with Jaycee's brother Brenden. They will both finish their training then.     The caregiver plan is for Fani, the sister from [place] to be with Jaycee through March 2019. Then Angel the friend will be living near Jaycee in [place], [place]. Brenden may also be a primary caregiver.  Bertha visits Jaycee on occasion.     Discontinue to apartment today

## 2018-12-18 NOTE — PLAN OF CARE
D: Pt with h/y of  HTN, CHF (LVEF 15%), NIDDM2 who underwent HeartMate III LVAD placement on 12/5/18. H/y of gout.   I: Monitored vitals and assessed pt status.     A: A0x4. VSS, . SR/ST. Afebrile. Denies pain,no nausea,no SOB reported.  LVAD HM 3 #s WNL,dressing CDI,  no alarms during night. Reported itching in his mid lower back site, MD cross cover notified, hydrocortisone cream order placed.  Offered hydrocortisone , pt refused ,wants it later ,passed to day RN.   Neuro check without changes.        Temp:  [97.4  F (36.3  C)-98.1  F (36.7  C)] 97.5  F (36.4  C)  Pulse:  [53] 53  Heart Rate:  [] 96  Resp:  [16-17] 16  BP: ()/(42-64) 87/57  SpO2:  [96 %-99 %] 99 %      P: Continue to monitor Pt status and report changes to treatment team. Needs to complete LVAD teaching.  Possibly  will be dc today

## 2018-12-18 NOTE — PROGRESS NOTES
All VAD education is complete.   Danielito Chu, Fani Chu, and Yanna Cedillo verbalized understanding of and/or correctly demonstrated the ability to:   -Switch power sources  -Change controller. They both demonstrate controller change properly and verbalized understanding that patient should only change controller after discussion with VAD Coordinator and in the presence of a trained caregiver whenever possible.   -Identify controller, AC/DC power sources, and Battery charger function, alarms, and alarms management.   -Perform Self test on controller   -State VAD precautions and warnings (including but not limited to: travel bags within arms reach at all time, using AC power while sleeping, avoid total immersion in water, boating, MRIs, metal detectors, contact sports, vacuuming or activity that might create static electricity).   -Identify an emergency and state the correct action in the event of an emergency.   -Recognize situations that require paging VAD coordinator and demonstrate proper paging technique.   -Determine procedures that necessitate prophylactic antibiotics.   -Warfarin is managed by [facility] anticoagulation clinic. Pt understands that he should never stop or change coumadin dose unless directed to do so by either VAD team or [facility] anticoagulation.  -Verbalized understanding of VAD parameters, normal limits, and actions required when outside of range.    -Fani and Yanna demonstrated removing the old dressing, cleaning the exit site, and applying new dressing using sterile technique. Understands need for DL immobilization and signs/symptoms of infection.  -Both patient, Yanna, and Fani passed written test.  -Patient confirms having working 3-pronged grounded outlets at home.  -Critical life-sustaining medical equipment letter faxed to [employer].  -VAD information packet faxed to pt's identified EMS service and primary care provider.  -Referral sent to [facility] medication  monitoring clinic for warfarin management and dosing. Pt will have labs drawn at the [facility] for one month and then [facility] as outpatient  -Driveline exit site supplies will be ordered from [employer]  Follow-up appointments scheduled with CVTS Dr Silva 1/15 and with Cardiology Minoo Lopez 12/26..      Going home with your VAD    You are about to leave the hospital with your new VAD. This time can feel overwhelming. Your VAD Coordinator team is here to do everything we can to make this transition as smooth as possible. Below are contact numbers and information to help ease the process. A VAD Coordinator is available 24/7 should you have any questions. We would be more than happy to assist you.     Please remember, if you have a true emergency, ALWAYS call 911 first. Then call the on-call VAD Coordinator.     To Reach the On-Call VAD Coordinator: Dial the main hospital number at [phone]. Choose option 4 to speak with the . Ask him or her to page the on-call VAD Coordinator. We should get back to you within five minutes.     Symptoms to Report: Please contact the on-call VAD Coordinator to report:  - Bleeding   - Dizziness/lightheadedness  - Changes in your VAD parameters (+/- 2 from baseline)  - VAD alarms  - Numbness, tingling, or difficulty moving your arms or legs  - Changes in speech, swallowing, or mental status  - ANY head injury, even if it is just a small bump  - Dark, black, tarry, red, or bloody stools  - If you vomit and it is bloody, black, or looks like coffee grounds  - Increased drainage, redness, tenderness, or trauma to your driveline site, chest incision, or chest tube sites    - Questions about your medications  - Questions about your Coumadin or INR  - Any other changes or concerns    Dressing Supplies: Your dressing supply company is [employer]. Please call them once you leave the hospital. They can be reached at [phone]. Verify that they have  the correct address for delivery, especially if you are staying in the [place] before going home.     Blood Thinners: Your Coumadin (warfarin) will be managed by the [facility]. They can be reached M-F, 8am-4pm. They will communicate with you regarding your blood draws and your Coumadin dose. If you have an INR drawn and you don t hear from the clinic by 2pm please call them at [phone].  Please never allow anyone else to adjust your Coumadin or Aspirin without talking to a VAD Coordinator.     Clinic Appointments: The VAD team will schedule you for all of your follow-up visits. If you have any questions please call the main VAD office at [phone] to speak with our . You can also contact your VAD Coordinator.     VAD Coordinator: Your VAD Coordinator, Edgardo Elias can be reached M-F, 8am-4pm, at [phone] or [email or web address].    If you have any further questions or concerns about your VAD please refer to the discharge folder you received from your VAD Coordinator and/or call the on-call VAD Coordinator.

## 2018-12-18 NOTE — PROGRESS NOTES
LVAD Social Work Service Discharge Note      Patient Name:  Danieltio Chu     Anticipated Discharge Date:  12/18/18    Discharge Disposition:   Other:  [place] [address] with Sister    Plan for 24 hour care for immediate post transplant period: Sister will provide 24-hour care. Brother involved as well as friend    If not local, plans for short term stay:  Patient does not live local, but he and family are in a local apartment    Additional Services/Equipment Arranged:  None     Persons notified of above discharge plan:  Patient, Sister, Brother    Patient / Family response to discharge plan:  Agreeable to discharge. Patient has been expressing desire to leave for a few days. Reports wanting to get good sleep.    Education and resources provided by SW at discharge: role of LVAD  in out patient setting and provided contact info for , availability of support groups      Plan: Fabiano was admitted to the hospital on 11/28 for LVAD implant. He recovered well and is ready to discharge home. Family got into local apartment right away and the plan is for him and Sister to stay for another 30 days prior to being cleared to return home. He will come to clinic 1-2 times/week for clinic appointments. SW will remain available if needs arise. Sister verified awareness of how to reach this writer for needs. No inpatient or SW discharge needs identified

## 2018-12-18 NOTE — PLAN OF CARE
Discharge Planner PT   Patient plan for discharge: Home with OP CR  Current status: Patient continues to be limited by fatigue and SOB with limited activity tolerance. Mod I with fww but appears to be at risk for falls without fww. Therefore, recommend 24/7 use of fww for safety at discharge   Barriers to return to prior living situation: Fatigue  Recommendations for discharge: Home with OP CR  Rationale for recommendations: To decrease fatigue, improve endurance and community reintegration        Entered by: Malia Valdez 12/18/2018 9:51 AM

## 2018-12-18 NOTE — PLAN OF CARE
D/I/A: Pt here s/p LVAD implantation. VSS, VAD #s WNL. K replaced. Dressing changed by coordinator. Pt ambulated w/ RN and OT.  P: Continue to monitor. Pt very eager to discharge.

## 2018-12-19 ENCOUNTER — CARE COORDINATION (OUTPATIENT)
Dept: CARDIOLOGY | Facility: CLINIC | Age: 63
End: 2018-12-19
Payer: COMMERCIAL

## 2018-12-19 ENCOUNTER — CARE COORDINATION (OUTPATIENT)
Dept: CARDIOLOGY | Facility: CLINIC | Age: 63
End: 2018-12-19

## 2018-12-19 DIAGNOSIS — Z95.811 LVAD (LEFT VENTRICULAR ASSIST DEVICE) PRESENT (H): Primary | ICD-10-CM

## 2018-12-19 NOTE — PROGRESS NOTES
"Called patient/caregiver Fani to check in 1 day post discharge. Pt reports VAD parameters normal with a PI dipping down to 2.8 and weight \"OK\" but different on a different scale in the apartment. Reviewed medications and answered any questions. Patient reports sleeping well last night and no anxiety since being home with LVAD. Patient is fully able to move around the house and care for him/herself independently.He went out to get a haircut today but took along an unchained caregiver with him. I reminded Fabiano that he needs to have his trained caregiver, Fani with him 24/7 for the next thirty days. He said he understood this.    Discussed specific new problems/stressors since being discharged from the hospital: I asked him to set up the VM on his phone and I reminded him to answer even if he doesn't recognize the number. Empathized with patient and reviewed coping strategies: enlisting support from friends and love ones, attending patient and caregiver support groups, reviewing LVAD educational materials to reinforce knowledge, and talking about concerns with family/care providers/trusted others. Encouraged pt to page VAD Coordinator with any issues or questions. Pt verbalizes understanding.      "

## 2018-12-19 NOTE — PLAN OF CARE
Occupational Therapy Discharge Summary    Reason for therapy discharge:    Discharged to home with outpatient therapy.    Progress towards therapy goal(s). See goals on Care Plan in [facility] electronic health record for goal details.  Goals partially met.  Barriers to achieving goals:   discharge from facility.    Therapy recommendation(s):    Continued therapy is recommended.  Rationale/Recommendations:  Pt would benefit from continued OP CR Phase II in order to maximize strength and aerobic activity tolerance in order to return to full independence in ADLs/IADLs.

## 2018-12-19 NOTE — PROGRESS NOTES
Patient has LVAD (left ventricular assist device) in place. No post-discharge follow up call is needed.

## 2018-12-20 ENCOUNTER — ANTICOAGULATION THERAPY VISIT (OUTPATIENT)
Dept: ANTICOAGULATION | Facility: CLINIC | Age: 63
End: 2018-12-20

## 2018-12-20 DIAGNOSIS — Z95.811 LVAD (LEFT VENTRICULAR ASSIST DEVICE) PRESENT (H): ICD-10-CM

## 2018-12-20 DIAGNOSIS — I50.22 CHRONIC SYSTOLIC HEART FAILURE (H): ICD-10-CM

## 2018-12-20 DIAGNOSIS — K29.70 GASTRITIS WITHOUT BLEEDING, UNSPECIFIED CHRONICITY, UNSPECIFIED GASTRITIS TYPE: ICD-10-CM

## 2018-12-20 DIAGNOSIS — I50.23 ACUTE ON CHRONIC SYSTOLIC HEART FAILURE (H): ICD-10-CM

## 2018-12-20 LAB
ANION GAP SERPL CALCULATED.3IONS-SCNC: 10 MMOL/L (ref 3–14)
BUN SERPL-MCNC: 11 MG/DL (ref 7–30)
CALCIUM SERPL-MCNC: 8.4 MG/DL (ref 8.5–10.1)
CHLORIDE SERPL-SCNC: 98 MMOL/L (ref 94–109)
CO2 SERPL-SCNC: 26 MMOL/L (ref 20–32)
CREAT SERPL-MCNC: 0.7 MG/DL (ref 0.66–1.25)
GFR SERPL CREATININE-BSD FRML MDRD: >90 ML/MIN/{1.73_M2}
GLUCOSE SERPL-MCNC: 126 MG/DL (ref 70–99)
INR PPP: 1.72 (ref 0.86–1.14)
NT-PROBNP SERPL-MCNC: 2244 PG/ML (ref 0–125)
POTASSIUM SERPL-SCNC: 3.5 MMOL/L (ref 3.4–5.3)
SODIUM SERPL-SCNC: 133 MMOL/L (ref 133–144)

## 2018-12-20 RX ORDER — LANOLIN ALCOHOL/MO/W.PET/CERES
100 CREAM (GRAM) TOPICAL DAILY
Qty: 30 TABLET | Refills: 0 | Status: SHIPPED | OUTPATIENT
Start: 2018-12-20

## 2018-12-20 NOTE — PROGRESS NOTES
ANTICOAGULATION FOLLOW-UP CLINIC VISIT    Patient Name:  Danielito Chu  Date:  2018  Contact Type:  Telephone    SUBJECTIVE:     Patient Findings     Positives:   No Problem Findings    Comments:   Sister Naomi reports pt took 4mg  and 3mg . Will document this on calendar to reflect this. Instructions for pt to increase ASA 325mg Daily per LVAD protocol            OBJECTIVE    INR   Date Value Ref Range Status   2018 1.72 (H) 0.86 - 1.14 Final     Factor 2 Assay   Date Value Ref Range Status   10/27/2018 70 60 - 140 % Final     Comment:     The Factor 2 activity level is not a screening test for the Prothrombin 39543   mutation.         ASSESSMENT / PLAN  INR assessment SUB    Recheck INR In: 2 DAYS    INR Location Clinic      Anticoagulation Summary  As of 2018    INR goal:   2.0-3.0   TTR:   --   INR used for dosin.72! (2018)   Warfarin maintenance plan:   No maintenance plan   Full warfarin instructions:   : 5 mg; : 4 mg   Plan last modified:   Kristie Fernandez RN (2018)   Next INR check:   2018   Priority:   INR   Target end date:   Indefinite    Indications    LVAD (left ventricular assist device) present (H) [Z95.811]             Anticoagulation Episode Summary     INR check location:       Preferred lab:       Send INR reminders to:   [facility]    Comments:   LVAD placed 18 ASA 81mg Daily Has Jantoven 2mg tablets   Please speak with sister Naomi  [phone]  Pt will be staying at the [facility] until he is stable to go home.      Anticoagulation Care Providers     Provider Role Specialty Phone number    Lorena Watkins MD [facility] [phone]            See the Encounter Report to view Anticoagulation Flowsheet and Dosing Calendar (Go to Encounters tab in chart review, and find the Anticoagulation Therapy Visit)    Spoke with sister naomi. Gave them lab results and new warfarin recommendation.  No  changes in health, medication, or diet. No missed doses, no falls. No signs or symptoms of bleed or clotting.     Warfarin Plan for the weekend    < 2.00 5mg 12/22 and 5mg 12/23. Check INR 12/24. Continue ASA 325mg Daily    > 2.00 4mg 12/22 and 5mg 12/23. Check INR 12/24. Go back to ASA 81mg Daily    > 3.00 3mg 12/22 and 3mg 12/23. Check INR 12/24. Go back to ASA 81mg Daily    Kristie Fernandez RN

## 2018-12-21 ENCOUNTER — HOSPITAL ENCOUNTER (EMERGENCY)
Facility: CLINIC | Age: 63
Discharge: HOME | End: 2018-12-21
Attending: EMERGENCY MEDICINE | Admitting: EMERGENCY MEDICINE
Payer: COMMERCIAL

## 2018-12-21 VITALS
DIASTOLIC BLOOD PRESSURE: 64 MMHG | WEIGHT: 181.6 LBS | TEMPERATURE: 97.6 F | RESPIRATION RATE: 16 BRPM | OXYGEN SATURATION: 86 % | SYSTOLIC BLOOD PRESSURE: 87 MMHG | BODY MASS INDEX: 26.06 KG/M2 | HEART RATE: 94 BPM

## 2018-12-21 DIAGNOSIS — Z95.811 LVAD (LEFT VENTRICULAR ASSIST DEVICE) PRESENT (H): ICD-10-CM

## 2018-12-21 DIAGNOSIS — M10.071 ACUTE IDIOPATHIC GOUT OF RIGHT FOOT: ICD-10-CM

## 2018-12-21 LAB
ANION GAP SERPL CALCULATED.3IONS-SCNC: 8 MMOL/L (ref 3–14)
ANISOCYTOSIS BLD QL SMEAR: ABNORMAL
BASOPHILS # BLD AUTO: 0 10E9/L (ref 0–0.2)
BASOPHILS NFR BLD AUTO: 0 %
BUN SERPL-MCNC: 13 MG/DL (ref 7–30)
CALCIUM SERPL-MCNC: 8.1 MG/DL (ref 8.5–10.1)
CHLORIDE SERPL-SCNC: 98 MMOL/L (ref 94–109)
CO2 SERPL-SCNC: 27 MMOL/L (ref 20–32)
CREAT SERPL-MCNC: 0.77 MG/DL (ref 0.66–1.25)
DIFFERENTIAL METHOD BLD: ABNORMAL
EOSINOPHIL # BLD AUTO: 0 10E9/L (ref 0–0.7)
EOSINOPHIL NFR BLD AUTO: 0 %
ERYTHROCYTE [DISTWIDTH] IN BLOOD BY AUTOMATED COUNT: 19.5 % (ref 10–15)
GFR SERPL CREATININE-BSD FRML MDRD: >90 ML/MIN/{1.73_M2}
GLUCOSE SERPL-MCNC: 136 MG/DL (ref 70–99)
HCT VFR BLD AUTO: 32.9 % (ref 40–53)
HGB BLD-MCNC: 10.1 G/DL (ref 13.3–17.7)
INR PPP: 2.08 (ref 0.86–1.14)
LYMPHOCYTES # BLD AUTO: 1.1 10E9/L (ref 0.8–5.3)
LYMPHOCYTES NFR BLD AUTO: 7.9 %
MCH RBC QN AUTO: 29.4 PG (ref 26.5–33)
MCHC RBC AUTO-ENTMCNC: 30.7 G/DL (ref 31.5–36.5)
MCV RBC AUTO: 96 FL (ref 78–100)
MONOCYTES # BLD AUTO: 1.3 10E9/L (ref 0–1.3)
MONOCYTES NFR BLD AUTO: 9.6 %
MYELOCYTES # BLD: 0.3 10E9/L
MYELOCYTES NFR BLD MANUAL: 2.6 %
NEUTROPHILS # BLD AUTO: 10.7 10E9/L (ref 1.6–8.3)
NEUTROPHILS NFR BLD AUTO: 79.9 %
OVALOCYTES BLD QL SMEAR: SLIGHT
PLATELET # BLD AUTO: 601 10E9/L (ref 150–450)
PLATELET # BLD EST: ABNORMAL 10*3/UL
POIKILOCYTOSIS BLD QL SMEAR: SLIGHT
POTASSIUM SERPL-SCNC: 3.5 MMOL/L (ref 3.4–5.3)
RBC # BLD AUTO: 3.43 10E12/L (ref 4.4–5.9)
SODIUM SERPL-SCNC: 133 MMOL/L (ref 133–144)
WBC # BLD AUTO: 13.4 10E9/L (ref 4–11)

## 2018-12-21 PROCEDURE — 85610 PROTHROMBIN TIME: CPT | Performed by: EMERGENCY MEDICINE

## 2018-12-21 PROCEDURE — 99285 EMERGENCY DEPT VISIT HI MDM: CPT | Mod: Z6 | Performed by: EMERGENCY MEDICINE

## 2018-12-21 PROCEDURE — 25000132 ZZH RX MED GY IP 250 OP 250 PS 637: Performed by: EMERGENCY MEDICINE

## 2018-12-21 PROCEDURE — 99285 EMERGENCY DEPT VISIT HI MDM: CPT | Performed by: EMERGENCY MEDICINE

## 2018-12-21 PROCEDURE — 25000125 ZZHC RX 250: Performed by: EMERGENCY MEDICINE

## 2018-12-21 PROCEDURE — 85025 COMPLETE CBC W/AUTO DIFF WBC: CPT | Performed by: EMERGENCY MEDICINE

## 2018-12-21 PROCEDURE — 80048 BASIC METABOLIC PNL TOTAL CA: CPT | Performed by: EMERGENCY MEDICINE

## 2018-12-21 RX ORDER — COLCHICINE 0.6 MG/1
0.6 TABLET ORAL DAILY
Status: DISCONTINUED | OUTPATIENT
Start: 2018-12-21 | End: 2018-12-21 | Stop reason: HOSPADM

## 2018-12-21 RX ORDER — PREDNISONE 20 MG/1
40 TABLET ORAL ONCE
Status: COMPLETED | OUTPATIENT
Start: 2018-12-21 | End: 2018-12-21

## 2018-12-21 RX ADMIN — PREDNISONE 40 MG: 20 TABLET ORAL at 10:55

## 2018-12-21 RX ADMIN — COLCHICINE 0.6 MG: 0.6 TABLET, FILM COATED ORAL at 11:18

## 2018-12-21 SDOH — HEALTH STABILITY: MENTAL HEALTH: HOW OFTEN DO YOU HAVE A DRINK CONTAINING ALCOHOL?: NEVER

## 2018-12-21 ASSESSMENT — ENCOUNTER SYMPTOMS
ABDOMINAL PAIN: 0
VOMITING: 0
FEVER: 0
SHORTNESS OF BREATH: 0
NAUSEA: 0

## 2018-12-21 NOTE — CONSULTS
[facility] Rheumatology IP H&P     Consult for: gout flare  Consult by: Dr. Haile      ASSESSMENT: 64 yo M with history of DM2, CHF s/p LVAD (12/5/18), crystal proven gout (based on L knee artherocentesis 12/13/2018) with evidence of mild erosions of 1st metatarsals on foot xray returns to ED today for new onset pain in right foot, without edema or erythema, concerning for early onset gout flare. Unfortunately, this flare seems focused in midfoot joints, less so in ankle and first MTP, and is not amenable to injection. In discussion with Dr. Lincoln of cardiology, we have generated an outpatient management plan that would minimize his risk of infection post LVAD while providing him with adequate pain control.    In the future, with further distance from LVAD, would treat patient with short course (3-5 days) of 40 mg prednisone as he reports excellent response to it in the past.    Continue losartan as it is uricosuric. Atorvastatin is as well and seems to have dropped off his med list.    DIAGNOSIS:  1. Crystal proven gouty arthritis (affected joints include bilateral MTPs, right midfoot and ankle, left knee)    PLAN:  1. Continue allopurinol 200 mg daily  2. Prednisone 40 mg once in ED  3. Colchicine treatment dosing (give 0.6 mg now, 0.6 mg in an hour, thereafter 0.6 mg colchicine BID for 5 days, then return to once daily 0.6 mg colchicine prophylactic dosing)  4. If this is not adequate and patient returns to ED, would have to recruit assistance of IR for guided steroid injection  5. Follow up with rheumatology clinic as previously scheduled Dec 26th at 12:30 pm    I discussed the findings and recommendations with the patient.  I communicated the assessment and plan to the consulting physician.  Rheumatology will sign off.    Case seen and discussed with Dr. Jose A Mueller MD, PhD  Rheumatology Fellow  Pager [phone]    ATTENDING TIE IN NOTE:    I saw the patient with the fellow.  My exam and  recommendations are as described.      Mal Naranjo MD FACP    Rheumatic and Autoimmune Diseases           HPI: Fabiano reports 1 day of pain when walking on right foot or moving ankle. Can't get to bathroom. No swelling, no redness. Left foot has minimal pain. He wanted to get on top of treatment before it gets any worst. No recent fever, no chills, no other painful joints.      HISTORY REVIEW:  Past Medical History:   Diagnosis Date     Acute systolic congestive heart failure (H) 2017     Diabetes (H)      HTN (hypertension)      Hyperlipidemia      Mitral regurgitation      Nocturnal oxygen desaturation 3/29/2018     Nonischemic cardiomyopathy (H) 2017     SHIRLEY (obstructive sleep apnea)      Pacemaker 2017    ICD 17     Past Surgical History:   Procedure Laterality Date     ESOPHAGOSCOPY, GASTROSCOPY, DUODENOSCOPY (EGD), COMBINED N/A 2018    Procedure: COMBINED ESOPHAGOSCOPY, GASTROSCOPY, DUODENOSCOPY (EGD);  Surgeon: Candido Mendez MD;  Location:  GI     IMPLANT IMPLANTABLE CARDIOVERTER DEFIBRILLATOR       INSERT VENTRICULAR ASSIST DEVICE LEFT (HEARTMATE II/III) MINIMALLY INVASIVE N/A 2018    Procedure: Partial Median Sternotomy, Left Thoracotomy, Minimally Invasive Left Ventricular Assist Device Placement (Heartmate III), On Cardiopulmonary Bypass;  Surgeon: Andrei Silva MD;  Location:  OR     Family History   Problem Relation Age of Onset     Heart Failure Father          at age 86     Heart Failure Paternal Uncle          at 66      Myocardial Infarction Paternal Uncle          at age 62     Coronary Artery Disease Mother          during CABG at age 41     Social History     Socioeconomic History     Marital status: Single     Spouse name: Not on file     Number of children: Not on file     Years of education: Not on file     Highest education level: Not on file   Social Needs     Financial resource strain: Not on file      Food insecurity - worry: Not on file     Food insecurity - inability: Not on file     Transportation needs - medical: Not on file     Transportation needs - non-medical: Not on file   Occupational History     Not on file   Tobacco Use     Smoking status: Former Smoker     Smokeless tobacco: Never Used     Tobacco comment: quit 3/2017   Substance and Sexual Activity     Alcohol use: No     Frequency: Never     Comment: social     Drug use: No     Sexual activity: Not on file   Other Topics Concern     Parent/sibling w/ CABG, MI or angioplasty before 65F 55M? Not Asked   Social History Narrative     Not on file     Patient Active Problem List   Diagnosis     CHF (congestive heart failure) (H)     Acute systolic congestive heart failure (H)     Nonischemic cardiomyopathy (H)     Cardiac defibrillator, [employer], Single Chamber- NOT dependent     Nocturnal oxygen desaturation     Acute on chronic systolic CHF (congestive heart failure) (H)     Heart failure with acute decompensation, type unknown (H)     Acute on chronic systolic heart failure (H)     Heart failure (H)     LVAD (left ventricular assist device) present (H)     No Known Allergies    REVIEW OF SYMPTOMS:  A 10 point ROS was performed with pertinent findings listed above.    OBJECTIVE:  PHYSICAL EXAM  BP (!) 80/58   Pulse 94   Temp 97.6  F (36.4  C)   Resp 16   Wt 82.4 kg (181 lb 9.6 oz)   SpO2 (!) 86%   BMI 26.06 kg/m    Wt Readings from Last 4 Encounters:   12/21/18 82.4 kg (181 lb 9.6 oz)   12/17/18 80.6 kg (177 lb 11.2 oz)   11/26/18 83.5 kg (184 lb)   11/21/18 80.2 kg (176 lb 11.2 oz)     Constitutional: laying in bed in NAD  Head: face symmetric  Eyes: white sclera  ENT: moist mucosa  Neck: no mass or thyroid enlargement. No LAD  Resp: breathing comfortably on room air  CV: hum of lvad, warm extremities, no JV distention  GI: not distended  MS:   - Left foot with mild tenderness midfoot.   - Right foot with tenderness and severe pain on  palpation of all metatarsal-cuneiform junctions, less so naviculotalar and talar-calcaneus with pain with active flexion of ankle, less so 1st MTP  - all finger joints, wrists, elbows, shoulders, knees evaluated and unremarkable  Neuro: moving all limbs spontaneously  Psych: nl judgement, orientation, memory, affect.    DATA:  Outside Records: Not Applicable  Outside Xrays: Not Applicable  CBC:  Recent Labs   Lab Test 12/21/18  0906 12/18/18  0618 12/17/18  0544   WBC 13.4* 10.5 12.4*   RBC 3.43* 3.22* 3.24*   HGB 10.1* 9.6* 9.6*   HCT 32.9* 31.1* 31.8*   MCV 96 97 98   MCH 29.4 29.8 29.6   MCHC 30.7* 30.9* 30.2*   RDW 19.5* 20.2* 20.7*   * 565* 645*       BMP:  Recent Labs   Lab Test 12/21/18  0906 12/20/18  1010 12/18/18  0618    133 136   POTASSIUM 3.5 3.5 3.6   CHLORIDE 98 98 102   CO2 27 26 25   ANIONGAP 8 10 9   * 126* 114*   BUN 13 11 10   CR 0.77 0.70 0.62*   GFRESTIMATED >90 >90 >90   ASHLEE 8.1* 8.4* 8.3*       LFT:  Recent Labs   Lab Test 12/07/18  0403 12/04/18  0800 12/03/18  1850   PROTTOTAL 5.2* 6.8 7.1   ALBUMIN 3.1* 3.2* 3.2*   BILITOTAL 5.8* 2.0* 2.2*   ALKPHOS 72 97 99   * 14 12   ALT 18 13 14       No results found for: CKTOTAL  TSH   Date Value Ref Range Status   10/24/2018 6.66 (H) 0.40 - 4.00 mU/L Final     Lab Results   Component Value Date    URIC 12.0 11/13/2018    URIC 12.9 10/24/2018    URIC 5.7 04/08/2017       Inflammatory markers  Lab Results   Component Value Date    CRP 3.1 10/27/2018    CRP 4.4 10/24/2018    CRP 5.0 10/24/2018    CRP 13.0 04/08/2017       Ferritin   Date Value Ref Range Status   10/24/2018 67 26 - 388 ng/mL Final   03/30/2017 290 26 - 388 ng/mL Final       UA RESULTS:  Recent Labs   Lab Test 12/07/18  0801 11/29/18  1310 10/23/18  1810   COLOR Dark Yellow Yellow Yellow   APPEARANCE Slightly Cloudy Clear Clear   URINEGLC Negative Negative Negative   URINEBILI Small* Negative Negative   URINEKETONE Negative Negative Negative   SG 1.015 1.010  1.008   UBLD Negative Negative Negative   URINEPH 5.0 7.5* 6.5   PROTEIN 10* 10* Negative   NITRITE Negative Negative Negative   LEUKEST Trace* Negative Negative   RBCU 4* 1 <1   WBCU 2 2 0

## 2018-12-21 NOTE — ED AVS SNAPSHOT
[facility], [place], Emergency Department  [address] [place] [id number]  Phone:  [phone]                                    Danielito Chu   MRN: [id number]    Department:  [facility], Emergency Department   Date of Visit:  12/21/2018           After Visit Summary Signature Page    I have received my discharge instructions, and my questions have been answered. I have discussed any challenges I see with this plan with the nurse or doctor.    ..........................................................................................................................................  Patient/Patient Representative Signature      ..........................................................................................................................................  Patient Representative Print Name and Relationship to Patient    ..................................................               ................................................  Date                                   Time    ..........................................................................................................................................  Reviewed by Signature/Title    ...................................................              ..............................................  Date                                               Time          22EPIC Rev 08/18

## 2018-12-21 NOTE — ED TRIAGE NOTES
Pt presents via W/C to triage from home with sister. Pt states beginning today started having bilateral pain in feet that is much worse in right foot than left. Pt denies other new complaints. Pt recenet placement of LVAD. Pt has been having on and off flare ups of what he states feels like gout.

## 2018-12-21 NOTE — ED PROVIDER NOTES
History     Chief Complaint   Patient presents with     Foot Pain     HPI  Danielito Chu is a 63 year old male with a history of hypertension, mitral regurgitation, type II diabetes, gout, SHIRLEY, CHF (LVEF 15%), and recent LVAD placement (12/5/2018), who presents to the Emergency Department for evaluation of foot pain. Patient complains of right large toe pain which he attributes to being a gout flare. Patient states that yesterday morning he was running errands and able to walk without much assistance, however, around 13:00 that afternoon he developed a tingling sensation at the toe which progressed to what he describes as a full gout flare. Patient states that this is different compared to the three previous gout flares as his toe appears less swollen. He denies any recent falls or injuries. He denies any fevers, significant shortness of breath and states that his post-operative pain has been relatively manageable. Patient reports that he is currently taking colchicine and allopurinol, which he was placed on during his recent admission, but notes that he was previously treated with prednisone and felt that was helpful.       Social: patient is here with his sister who is also his caretaker     I have reviewed the Medications, Allergies, Past Medical and Surgical History, and Social History in the [employer] system.    Past Medical History:   Diagnosis Date     Acute systolic congestive heart failure (H) 4/5/2017     Diabetes (H)      HTN (hypertension)      Hyperlipidemia      Mitral regurgitation      Nocturnal oxygen desaturation 3/29/2018     Nonischemic cardiomyopathy (H) 4/5/2017     SHIRLEY (obstructive sleep apnea)      Pacemaker 04/07/2017    ICD 4/7/17       Past Surgical History:   Procedure Laterality Date     ESOPHAGOSCOPY, GASTROSCOPY, DUODENOSCOPY (EGD), COMBINED N/A 11/21/2018    Procedure: COMBINED ESOPHAGOSCOPY, GASTROSCOPY, DUODENOSCOPY (EGD);  Surgeon: Candido Mendez MD;  Location: [facility]      IMPLANT IMPLANTABLE CARDIOVERTER DEFIBRILLATOR       INSERT VENTRICULAR ASSIST DEVICE LEFT (HEARTMATE II/III) MINIMALLY INVASIVE N/A 2018    Procedure: Partial Median Sternotomy, Left Thoracotomy, Minimally Invasive Left Ventricular Assist Device Placement (Heartmate III), On Cardiopulmonary Bypass;  Surgeon: Andrei Silva MD;  Location:  OR       Family History   Problem Relation Age of Onset     Heart Failure Father          at age 86     Heart Failure Paternal Uncle          at 66      Myocardial Infarction Paternal Uncle          at age 62     Coronary Artery Disease Mother          during CABG at age 41       Social History     Tobacco Use     Smoking status: Former Smoker     Smokeless tobacco: Never Used     Tobacco comment: quit 3/2017   Substance Use Topics     Alcohol use: No     Frequency: Never     Comment: social     Current Facility-Administered Medications   Medication     colchicine (COLCYRS) tablet 0.6 mg     predniSONE (DELTASONE) tablet 40 mg     Current Outpatient Medications   Medication     allopurinol (ZYLOPRIM) 100 MG tablet     acetaminophen (TYLENOL) 325 MG tablet     artificial saliva (BIOTENE DRY MOUTHWASH) LIQD liquid     aspirin (ASA) 81 MG chewable tablet     Blood Glucose Monitoring Suppl (BLOOD GLUCOSE MONITOR SYSTEM) w/Device KIT     bumetanide (BUMEX) 1 MG tablet     colchicine (COLCYRS) 0.6 MG tablet     hydrocortisone (CORTAID) 1 % external cream     losartan (COZAAR) 25 MG tablet     melatonin 3 MG tablet     nystatin (MYCOSTATIN) 458104 UNIT/ML suspension     omeprazole (PRILOSEC) 20 MG DR capsule     potassium chloride ER (K-DUR/KLOR-CON M) 20 MEQ CR tablet     senna-docusate (SENOKOT-S/PERICOLACE) 8.6-50 MG tablet     traZODone (DESYREL) 100 MG tablet     vitamin B1 (THIAMINE) 100 MG tablet     warfarin (COUMADIN) 2 MG tablet      No Known Allergies    Review of Systems   Constitutional: Negative for fever.   Respiratory: Negative for  shortness of breath.    Cardiovascular: Negative for chest pain.   Gastrointestinal: Negative for abdominal pain, nausea and vomiting.   Musculoskeletal:        Positive for right large toe pain.   All other systems reviewed and are negative.      Physical Exam   BP: 102/90  Pulse: 66  Temp: 97.6  F (36.4  C)  Resp: 16  Weight: 82.4 kg (181 lb 9.6 oz)  SpO2: 95 %      Physical Exam   Musculoskeletal:        Feet:      Physical Exam   Constitutional:   well nourished, well developed, resting comfortably   HENT:   Head: Normocephalic and atraumatic.   Cardiovascular: LVAD hum  Chest :  Healing midline scar  Pulmonary/Chest: bibasilar crackles with no wheezes or retractions. No respiratory distress.  GI: Soft with good bowel sounds.  Non-tender, non-distended, with no guarding, no rebound, no peritoneal signs.   Musculoskeletal:  See diagram above  Skin: Skin is warm and dry. No rash noted.   Neurological: alert and oriented to person, place, and time. Nonfocal exam  Psychiatric:  normal mood and affect.   ED Course           Procedures             Critical Care time:  none           Results for orders placed or performed during the hospital encounter of 12/21/18   CBC with platelets differential   Result Value Ref Range    WBC 13.4 (H) 4.0 - 11.0 10e9/L    RBC Count 3.43 (L) 4.4 - 5.9 10e12/L    Hemoglobin 10.1 (L) 13.3 - 17.7 g/dL    Hematocrit 32.9 (L) 40.0 - 53.0 %    MCV 96 78 - 100 fl    MCH 29.4 26.5 - 33.0 pg    MCHC 30.7 (L) 31.5 - 36.5 g/dL    RDW 19.5 (H) 10.0 - 15.0 %    Platelet Count 601 (H) 150 - 450 10e9/L    Diff Method Manual Differential     % Neutrophils 79.9 %    % Lymphocytes 7.9 %    % Monocytes 9.6 %    % Eosinophils 0.0 %    % Basophils 0.0 %    % Myelocytes 2.6 %    Absolute Neutrophil 10.7 (H) 1.6 - 8.3 10e9/L    Absolute Lymphocytes 1.1 0.8 - 5.3 10e9/L    Absolute Monocytes 1.3 0.0 - 1.3 10e9/L    Absolute Eosinophils 0.0 0.0 - 0.7 10e9/L    Absolute Basophils 0.0 0.0 - 0.2 10e9/L     Absolute Myelocytes 0.3 (H) 0 10e9/L    Anisocytosis Moderate     Poikilocytosis Slight     Ovalocytes Slight     Platelet Estimate Confirming automated cell count    Basic metabolic panel   Result Value Ref Range    Sodium 133 133 - 144 mmol/L    Potassium 3.5 3.4 - 5.3 mmol/L    Chloride 98 94 - 109 mmol/L    Carbon Dioxide 27 20 - 32 mmol/L    Anion Gap 8 3 - 14 mmol/L    Glucose 136 (H) 70 - 99 mg/dL    Urea Nitrogen 13 7 - 30 mg/dL    Creatinine 0.77 0.66 - 1.25 mg/dL    GFR Estimate >90 >60 mL/min/[1.73_m2]    GFR Estimate If Black >90 >60 mL/min/[1.73_m2]    Calcium 8.1 (L) 8.5 - 10.1 mg/dL   INR   Result Value Ref Range    INR 2.08 (H) 0.86 - 1.14      Labs Ordered and Resulted from Time of ED Arrival Up to the Time of Departure from the ED   CBC WITH PLATELETS DIFFERENTIAL - Abnormal; Notable for the following components:       Result Value    WBC 13.4 (*)     RBC Count 3.43 (*)     Hemoglobin 10.1 (*)     Hematocrit 32.9 (*)     MCHC 30.7 (*)     RDW 19.5 (*)     Platelet Count 601 (*)     Absolute Neutrophil 10.7 (*)     Absolute Myelocytes 0.3 (*)     All other components within normal limits   BASIC METABOLIC PANEL - Abnormal; Notable for the following components:    Glucose 136 (*)     Calcium 8.1 (*)     All other components within normal limits   INR - Abnormal; Notable for the following components:    INR 2.08 (*)     All other components within normal limits       Consults  Cardiology: Responded (12/21/18 0913)  Other (Comment): Responded(Rheumatology) (12/21/18 0902)    Assessments & Plan (with Medical Decision Making)       I have reviewed the nursing notes.  Emergency Department course:  The patient was seen and examined at 0843 am.  The patient is already on allopurinol and colchicine prophylactically for gout. His doses are: colchicine 0.6 mg every day and Allopurinol 200 mg every day.     I consulted rheumatology and spoke with Dr. Huff.  She states that there are 2 possible options.   The first is to admit the patient for treatment with Anakinra, which is not FDA approved and unavailable as an outpatient and requires admission for treatment. The second option is to treat the patient with a prednisone taper, 40 mg for 3 days, 30 mg for 3 days, 20 mg for 3 days and 10 mg for 3 days.  However with this option, the patient's fluid status must be closely monitored.    I spoke with Dr. Lincoln of cardiology to regarding these options.  He requested a formal rheumatology consult.    The patient was then seen by Dr. Montero of rheumatology in the ED.  Please see consultation note for details.  She feels that the patient has gout of his midfoot.  She recommends treating him with prednisone 40 mg once in the ED as well as colchicine 0.6 mg.  He should then take a second tablet of colchicine 0.6 mg in 1 hour.  He should take colchicine twice a day for 5 days and then go back to taking it once a day.  He has rheumatology follow-up on December 26.  Laboratory studies show leukocytosis, with a WBC of 13.4.  He is anemic, with a hemoglobin of 10.1.  He also has thrombocytosis with a platelet count of 601.  INR is therapeutic at 2.08.  Basic metabolic panel shows an elevated glucose of 136 and a calcium of 8.1 and is otherwise unremarkable.  The patient is a 63-year-old male with a history of recent LVAD placement here with right foot pain that is likely gout.  He was seen by rheumatology in consultation.  He is received 1 dose of prednisone and his colchicine dosing has been changed.  He has close outpatient follow-up with the rheumatology clinic.  I have reviewed the findings, diagnosis, plan and need for follow up with the patient.       Medication List      There are no discharge medications for this visit.         Final diagnoses:   Acute idiopathic gout of right foot   LVAD (left ventricular assist device) present (H)     Maria AHUJA, am serving as a trained medical scribe to document services  personally performed by Alessia Haile MD, based on the provider's statements to me.   I, Alessia Haile MD, was physically present and have reviewed and verified the accuracy of this note documented by Maria Gilliland.  This note was created in part by the use of Dragon voice recognition dictation system. Inadvertent grammatical errors and typographical errors may still exist.  Alessia Haile MD    12/21/2018   [facility], [place], EMERGENCY DEPARTMENT     Alessia Haile MD  12/21/18 1424

## 2018-12-21 NOTE — DISCHARGE INSTRUCTIONS
Take colchicine 1 hour after discharge.  Then take colchicine twice a day for 5 days.  Then go back to colchicine once a day.  Follow-up with the rheumatology clinic on December 26.

## 2018-12-22 DIAGNOSIS — Z95.811 LVAD (LEFT VENTRICULAR ASSIST DEVICE) PRESENT (H): ICD-10-CM

## 2018-12-22 LAB
ALBUMIN SERPL-MCNC: 2.7 G/DL (ref 3.4–5)
ALP SERPL-CCNC: 166 U/L (ref 40–150)
ALT SERPL W P-5'-P-CCNC: 50 U/L (ref 0–70)
ANION GAP SERPL CALCULATED.3IONS-SCNC: 6 MMOL/L (ref 3–14)
AST SERPL W P-5'-P-CCNC: 65 U/L (ref 0–45)
BILIRUB SERPL-MCNC: 0.9 MG/DL (ref 0.2–1.3)
BUN SERPL-MCNC: 15 MG/DL (ref 7–30)
CALCIUM SERPL-MCNC: 8.4 MG/DL (ref 8.5–10.1)
CHLORIDE SERPL-SCNC: 100 MMOL/L (ref 94–109)
CO2 SERPL-SCNC: 29 MMOL/L (ref 20–32)
CREAT SERPL-MCNC: 0.76 MG/DL (ref 0.66–1.25)
ERYTHROCYTE [DISTWIDTH] IN BLOOD BY AUTOMATED COUNT: 19.2 % (ref 10–15)
GFR SERPL CREATININE-BSD FRML MDRD: >90 ML/MIN/{1.73_M2}
GLUCOSE SERPL-MCNC: 117 MG/DL (ref 70–99)
HCT VFR BLD AUTO: 36.8 % (ref 40–53)
HGB BLD-MCNC: 11 G/DL (ref 13.3–17.7)
INR PPP: 2.61 (ref 0.86–1.14)
LDH SERPL L TO P-CCNC: 276 U/L (ref 85–227)
MCH RBC QN AUTO: 29 PG (ref 26.5–33)
MCHC RBC AUTO-ENTMCNC: 29.9 G/DL (ref 31.5–36.5)
MCV RBC AUTO: 97 FL (ref 78–100)
PLATELET # BLD AUTO: 673 10E9/L (ref 150–450)
POTASSIUM SERPL-SCNC: 3.7 MMOL/L (ref 3.4–5.3)
PROT SERPL-MCNC: 7.6 G/DL (ref 6.8–8.8)
RBC # BLD AUTO: 3.79 10E12/L (ref 4.4–5.9)
SODIUM SERPL-SCNC: 135 MMOL/L (ref 133–144)
URATE SERPL-MCNC: 3.8 MG/DL (ref 3.5–7.2)
WBC # BLD AUTO: 14.3 10E9/L (ref 4–11)

## 2018-12-24 ENCOUNTER — CARE COORDINATION (OUTPATIENT)
Dept: CARDIOLOGY | Facility: CLINIC | Age: 63
End: 2018-12-24

## 2018-12-24 ENCOUNTER — ANTICOAGULATION THERAPY VISIT (OUTPATIENT)
Dept: ANTICOAGULATION | Facility: CLINIC | Age: 63
End: 2018-12-24

## 2018-12-24 DIAGNOSIS — Z95.811 LVAD (LEFT VENTRICULAR ASSIST DEVICE) PRESENT (H): ICD-10-CM

## 2018-12-24 NOTE — PROGRESS NOTES
I called Yanna to review her test for the HM 3. She has no further questions. She is now checked off on her education to be a caregiver for Fabiano. She has agreed to call the VAD coordinator with any questions.

## 2018-12-24 NOTE — PROGRESS NOTES
Called patient/caregiver to check in 1 week post discharge. Pt reports VAD parameters stable and weight  stable. Reviewed medications and answered any questions. Patient reports sleeping great and no anxiety since being home with LVAD. Patient is partially able to move around the house and care for him. His gout has flared up again for which he was in the ED last Friday.     Discussed specific new problems/stressors since being discharged from the hospital: the gout., also some redness of concern at the old CT sites. I told Fabiano that the dsng will be changed in the clinic on 12/26. Empathized with patient and reviewed coping strategies: enlisting support from friends and love ones, attending patient and caregiver support groups, reviewing LVAD educational materials to reinforce knowledge, and talking about concerns with family/care providers/trusted others. Encouraged pt to page VAD Coordinator with any issues or questions. Pt verbalizes understanding.

## 2018-12-24 NOTE — PROGRESS NOTES
12/24/18 Left message for patient's sister to phone the [facility] with updates on dosing over the weekend.  Recommended 4mg today and tomorrow.  Next INR scheduled for 12/26.    Fady Avelar RN  ANTICOAGULATION FOLLOW-UP CLINIC VISIT    Patient Name:  Danielito Chu  Date:  12/24/2018  Contact Type:  Telephone    SUBJECTIVE:        OBJECTIVE    INR   Date Value Ref Range Status   12/22/2018 2.61 (H) 0.86 - 1.14 Final     Factor 2 Assay   Date Value Ref Range Status   10/27/2018 70 60 - 140 % Final     Comment:     The Factor 2 activity level is not a screening test for the Prothrombin 24650   mutation.         ASSESSMENT / PLAN  INR assessment THER    Recheck INR In: 2 DAYS    INR Location Clinic      Anticoagulation Summary  As of 12/24/2018    INR goal:   2.0-3.0   TTR:   --   INR used for dosing:      Warfarin maintenance plan:   No maintenance plan   Full warfarin instructions:   12/24: 4 mg; 12/25: 4 mg   Plan last modified:   Kristie Fernandez RN (12/20/2018)   Next INR check:   12/26/2018   Priority:   INR   Target end date:   Indefinite    Indications    LVAD (left ventricular assist device) present (H) [Z95.811]             Anticoagulation Episode Summary     INR check location:       Preferred lab:       Send INR reminders to:   [facility]    Comments:   LVAD placed 12/5/18 ASA 81mg Daily Has Jantoven 2mg tablets   Please speak with sister Fani  [phone]  Pt will be staying at the [facility] until he is stable to go home.      Anticoagulation Care Providers     Provider Role Specialty Phone number    Lorena Watkins MD [facility] Cardiology [phone]            See the Encounter Report to view Anticoagulation Flowsheet and Dosing Calendar (Go to Encounters tab in chart review, and find the Anticoagulation Therapy Visit)    Left message for Fani to update calendar with dosing on 12/22 and 12/23.  Will addend with Fabiano's weekend dose.    Fady Avelar RN

## 2018-12-26 ENCOUNTER — OFFICE VISIT (OUTPATIENT)
Dept: RHEUMATOLOGY | Facility: CLINIC | Age: 63
End: 2018-12-26
Attending: INTERNAL MEDICINE
Payer: COMMERCIAL

## 2018-12-26 ENCOUNTER — ANCILLARY PROCEDURE (OUTPATIENT)
Dept: CARDIOLOGY | Facility: CLINIC | Age: 63
End: 2018-12-26
Payer: COMMERCIAL

## 2018-12-26 ENCOUNTER — OFFICE VISIT (OUTPATIENT)
Dept: CARDIOLOGY | Facility: CLINIC | Age: 63
End: 2018-12-26
Attending: INTERNAL MEDICINE
Payer: COMMERCIAL

## 2018-12-26 ENCOUNTER — ANTICOAGULATION THERAPY VISIT (OUTPATIENT)
Dept: ANTICOAGULATION | Facility: CLINIC | Age: 63
End: 2018-12-26

## 2018-12-26 VITALS — WEIGHT: 181 LBS | HEART RATE: 64 BPM | TEMPERATURE: 97.4 F | BODY MASS INDEX: 25.97 KG/M2 | OXYGEN SATURATION: 98 %

## 2018-12-26 VITALS
OXYGEN SATURATION: 98 % | HEIGHT: 69 IN | SYSTOLIC BLOOD PRESSURE: 82 MMHG | BODY MASS INDEX: 26.72 KG/M2 | HEART RATE: 78 BPM | WEIGHT: 180.4 LBS

## 2018-12-26 DIAGNOSIS — Q64.32 CONGENITAL STRICTURE OF URETHRA: Primary | ICD-10-CM

## 2018-12-26 DIAGNOSIS — Z95.811 LVAD (LEFT VENTRICULAR ASSIST DEVICE) PRESENT (H): ICD-10-CM

## 2018-12-26 DIAGNOSIS — I50.22 CHRONIC SYSTOLIC HEART FAILURE (H): ICD-10-CM

## 2018-12-26 DIAGNOSIS — I50.23 ACUTE ON CHRONIC SYSTOLIC HEART FAILURE (H): ICD-10-CM

## 2018-12-26 DIAGNOSIS — M10.9 ACUTE GOUT OF RIGHT FOOT, UNSPECIFIED CAUSE: ICD-10-CM

## 2018-12-26 DIAGNOSIS — I42.8 OTHER CARDIOMYOPATHIES (H): ICD-10-CM

## 2018-12-26 DIAGNOSIS — M10.9 ACUTE GOUT OF RIGHT FOOT, UNSPECIFIED CAUSE: Primary | ICD-10-CM

## 2018-12-26 LAB
ICDO DEVICE COMMENTS: NORMAL
INR PPP: 3.38 (ref 0.86–1.14)
MDC_IDC_LEAD_IMPLANT_DT: NORMAL
MDC_IDC_LEAD_LOCATION: NORMAL
MDC_IDC_LEAD_LOCATION_DETAIL_1: NORMAL
MDC_IDC_LEAD_MFG: NORMAL
MDC_IDC_LEAD_MODEL: NORMAL
MDC_IDC_LEAD_POLARITY_TYPE: NORMAL
MDC_IDC_LEAD_SERIAL: NORMAL
MDC_IDC_MSMT_BATTERY_DTM: NORMAL
MDC_IDC_MSMT_BATTERY_REMAINING_LONGEVITY: 144 MO
MDC_IDC_MSMT_BATTERY_STATUS: NORMAL
MDC_IDC_MSMT_CAP_CHARGE_DTM: NORMAL
MDC_IDC_MSMT_CAP_CHARGE_ENERGY: 0 J
MDC_IDC_MSMT_CAP_CHARGE_TIME: 0 S
MDC_IDC_MSMT_CAP_CHARGE_TIME: 10.2
MDC_IDC_MSMT_CAP_CHARGE_TYPE: NORMAL
MDC_IDC_MSMT_CAP_CHARGE_TYPE: NORMAL
MDC_IDC_MSMT_LEADCHNL_RV_IMPEDANCE_VALUE: 369 OHM
MDC_IDC_MSMT_LEADCHNL_RV_PACING_THRESHOLD_AMPLITUDE: 1.5 V
MDC_IDC_MSMT_LEADCHNL_RV_PACING_THRESHOLD_PULSEWIDTH: 0.5 MS
MDC_IDC_MSMT_LEADCHNL_RV_SENSING_INTR_AMPL: 2.3 MV
MDC_IDC_PG_IMPLANT_DTM: NORMAL
MDC_IDC_PG_MFG: NORMAL
MDC_IDC_PG_MODEL: NORMAL
MDC_IDC_PG_SERIAL: NORMAL
MDC_IDC_PG_TYPE: NORMAL
MDC_IDC_SESS_CLINIC_NAME: NORMAL
MDC_IDC_SESS_DTM: NORMAL
MDC_IDC_SESS_TYPE: NORMAL
MDC_IDC_SET_BRADY_HYSTRATE: NORMAL
MDC_IDC_SET_BRADY_LOWRATE: 40 {BEATS}/MIN
MDC_IDC_SET_BRADY_MODE: NORMAL
MDC_IDC_SET_LEADCHNL_RV_PACING_AMPLITUDE: 2.2 V
MDC_IDC_SET_LEADCHNL_RV_PACING_ANODE_ELECTRODE_1: NORMAL
MDC_IDC_SET_LEADCHNL_RV_PACING_ANODE_LOCATION_1: NORMAL
MDC_IDC_SET_LEADCHNL_RV_PACING_CAPTURE_MODE: NORMAL
MDC_IDC_SET_LEADCHNL_RV_PACING_CATHODE_ELECTRODE_1: NORMAL
MDC_IDC_SET_LEADCHNL_RV_PACING_CATHODE_LOCATION_1: NORMAL
MDC_IDC_SET_LEADCHNL_RV_PACING_POLARITY: NORMAL
MDC_IDC_SET_LEADCHNL_RV_PACING_PULSEWIDTH: 0.5 MS
MDC_IDC_SET_LEADCHNL_RV_SENSING_ADAPTATION_MODE: NORMAL
MDC_IDC_SET_LEADCHNL_RV_SENSING_ANODE_ELECTRODE_1: NORMAL
MDC_IDC_SET_LEADCHNL_RV_SENSING_ANODE_LOCATION_1: NORMAL
MDC_IDC_SET_LEADCHNL_RV_SENSING_CATHODE_ELECTRODE_1: NORMAL
MDC_IDC_SET_LEADCHNL_RV_SENSING_CATHODE_LOCATION_1: NORMAL
MDC_IDC_SET_LEADCHNL_RV_SENSING_POLARITY: NORMAL
MDC_IDC_SET_LEADCHNL_RV_SENSING_SENSITIVITY: 0.6 MV
MDC_IDC_SET_ZONE_DETECTION_INTERVAL: 250 MS
MDC_IDC_SET_ZONE_DETECTION_INTERVAL: 300 MS
MDC_IDC_SET_ZONE_DETECTION_INTERVAL: 375 MS
MDC_IDC_SET_ZONE_TYPE: NORMAL
MDC_IDC_SET_ZONE_VENDOR_TYPE: NORMAL
MDC_IDC_STAT_BRADY_DTM_END: NORMAL
MDC_IDC_STAT_BRADY_DTM_START: NORMAL
MDC_IDC_STAT_BRADY_RV_PERCENT_PACED: 1 %
MDC_IDC_STAT_EPISODE_RECENT_COUNT: 0
MDC_IDC_STAT_EPISODE_RECENT_COUNT: 0
MDC_IDC_STAT_EPISODE_RECENT_COUNT: 7
MDC_IDC_STAT_EPISODE_RECENT_COUNT_DTM_END: NORMAL
MDC_IDC_STAT_EPISODE_RECENT_COUNT_DTM_START: NORMAL
MDC_IDC_STAT_EPISODE_TOTAL_COUNT: 0
MDC_IDC_STAT_EPISODE_TOTAL_COUNT: 0
MDC_IDC_STAT_EPISODE_TOTAL_COUNT: 285
MDC_IDC_STAT_EPISODE_TOTAL_COUNT_DTM_END: NORMAL
MDC_IDC_STAT_EPISODE_TYPE: NORMAL
MDC_IDC_STAT_EPISODE_VENDOR_TYPE: NORMAL
MDC_IDC_STAT_TACHYTHERAPY_ATP_DELIVERED_RECENT: 0
MDC_IDC_STAT_TACHYTHERAPY_ATP_DELIVERED_TOTAL: 0
MDC_IDC_STAT_TACHYTHERAPY_RECENT_DTM_END: NORMAL
MDC_IDC_STAT_TACHYTHERAPY_RECENT_DTM_START: NORMAL
MDC_IDC_STAT_TACHYTHERAPY_SHOCKS_ABORTED_RECENT: 0
MDC_IDC_STAT_TACHYTHERAPY_SHOCKS_ABORTED_TOTAL: 0
MDC_IDC_STAT_TACHYTHERAPY_SHOCKS_DELIVERED_RECENT: 0
MDC_IDC_STAT_TACHYTHERAPY_SHOCKS_DELIVERED_TOTAL: 0
MDC_IDC_STAT_TACHYTHERAPY_TOTAL_DTM_END: NORMAL
URATE SERPL-MCNC: 4.5 MG/DL (ref 3.5–7.2)

## 2018-12-26 PROCEDURE — G0463 HOSPITAL OUTPT CLINIC VISIT: HCPCS | Mod: 25,27,ZF

## 2018-12-26 PROCEDURE — 93750 INTERROGATION VAD IN PERSON: CPT | Mod: ZF | Performed by: NURSE PRACTITIONER

## 2018-12-26 PROCEDURE — 99214 OFFICE O/P EST MOD 30 MIN: CPT | Mod: 25 | Performed by: NURSE PRACTITIONER

## 2018-12-26 PROCEDURE — G0463 HOSPITAL OUTPT CLINIC VISIT: HCPCS | Mod: 25,ZF

## 2018-12-26 PROCEDURE — 84550 ASSAY OF BLOOD/URIC ACID: CPT | Performed by: INTERNAL MEDICINE

## 2018-12-26 PROCEDURE — 36415 COLL VENOUS BLD VENIPUNCTURE: CPT | Performed by: INTERNAL MEDICINE

## 2018-12-26 RX ORDER — COLCHICINE 0.6 MG/1
0.6 TABLET ORAL 2 TIMES DAILY
Qty: 60 TABLET | Refills: 3 | Status: SHIPPED | OUTPATIENT
Start: 2018-12-26 | End: 2019-04-28

## 2018-12-26 RX ORDER — METOPROLOL SUCCINATE 25 MG/1
12.5 TABLET, EXTENDED RELEASE ORAL DAILY
Qty: 45 TABLET | Refills: 1 | Status: SHIPPED | OUTPATIENT
Start: 2018-12-26 | End: 2019-09-10

## 2018-12-26 RX ORDER — PREDNISONE 20 MG/1
40 TABLET ORAL
Qty: 5 TABLET | Refills: 0 | Status: SHIPPED | OUTPATIENT
Start: 2018-12-26 | End: 2019-01-15

## 2018-12-26 RX ORDER — BUMETANIDE 1 MG/1
1 TABLET ORAL DAILY
Qty: 60 TABLET | Refills: 0 | COMMUNITY
Start: 2018-12-26 | End: 2019-01-02

## 2018-12-26 RX ORDER — POTASSIUM CHLORIDE 1500 MG/1
20 TABLET, EXTENDED RELEASE ORAL DAILY
Qty: 90 TABLET | Refills: 0 | COMMUNITY
Start: 2018-12-26 | End: 2019-01-02

## 2018-12-26 RX ORDER — ALLOPURINOL 100 MG/1
200 TABLET ORAL DAILY
Qty: 60 TABLET | Refills: 5 | Status: SHIPPED | OUTPATIENT
Start: 2018-12-26 | End: 2019-01-31

## 2018-12-26 ASSESSMENT — PAIN SCALES - GENERAL
PAINLEVEL: NO PAIN (0)
PAINLEVEL: NO PAIN (0)

## 2018-12-26 ASSESSMENT — MIFFLIN-ST. JEOR: SCORE: 1608.67

## 2018-12-26 NOTE — LETTER
Patient:  Danielito Chu  :   1955  MRN:     [id number]        Mr.Jeffrey Christian Chu  [address] [facility] [address]        2019    Dear ,    We are writing to inform you of your test results. Serum uric acid is normal. No changes in your medications is needed.    Component      Latest Ref Rng & Units 2018   Uric Acid      3.5 - 7.2 mg/dL 4.5       Melody Nassar MD

## 2018-12-26 NOTE — PROGRESS NOTES
ANTICOAGULATION FOLLOW-UP CLINIC VISIT    Patient Name:  Danielito Chu  Date:  12/26/2018  Contact Type:  Telephone    SUBJECTIVE:     Patient Findings     Comments:   In notes from visit today provider mentions that patient has been having trouble with gout.  Requested that Fani call back if patient has started any medications for his gout symptoms.            OBJECTIVE    INR   Date Value Ref Range Status   12/26/2018 3.38 (H) 0.86 - 1.14 Final     Factor 2 Assay   Date Value Ref Range Status   10/27/2018 70 60 - 140 % Final     Comment:     The Factor 2 activity level is not a screening test for the Prothrombin 23521   mutation.         ASSESSMENT / PLAN  No question data found.  Anticoagulation Summary  As of 12/26/2018    INR goal:   2.0-3.0   TTR:   61.7 % (4 d)   INR used for dosing:   3.38! (12/26/2018)   Warfarin maintenance plan:   No maintenance plan   Full warfarin instructions:   12/26: 3 mg; 12/27: 3 mg   Plan last modified:   Kristie Fernandez RN (12/20/2018)   Next INR check:   12/28/2018   Priority:   INR   Target end date:   Indefinite    Indications    LVAD (left ventricular assist device) present (H) [Z95.811]             Anticoagulation Episode Summary     INR check location:       Preferred lab:       Send INR reminders to:   [facility]    Comments:   LVAD placed 12/5/18 ASA 81mg Daily Has Jantoven 2mg tablets   Please speak with sister Fani  [phone]  Pt will be staying at the [facility] until he is stable to go home.      Anticoagulation Care Providers     Provider Role Specialty Phone number    Lorena Watkins MD [facility] Cardiology [phone]            See the Encounter Report to view Anticoagulation Flowsheet and Dosing Calendar (Go to Encounters tab in chart review, and find the Anticoagulation Therapy Visit)    Left message for patient with results and dosing recommendations. Asked patient to call back to report any missed doses, falls, signs and  symptoms of bleeding or clotting, any changes in health, medication, or diet. Asked patient to call back with any questions or concerns.     Bri Becerril RN

## 2018-12-26 NOTE — PATIENT INSTRUCTIONS
Will check serum uric acid    Continue allopurinol 200 mg a day, colcrys 0.6 mg twice a day    Recomemnd prednisone 40 mg a day x once if pain becomes not tolerable and call me    Jan 16 at 1:15 am add on

## 2018-12-26 NOTE — PROGRESS NOTES
HPI:   Mr. Danielito Chu is a 63yr old male with a history of NICM (LVEF at dong of 15%) s/p ICD (2017), HTN, SHIRLEY, and HL who underwent HM3 LVAD implant on 18. Hospital course was notable for postoperative flolan and pressor support. He later required niptire for HTN control and developed an acute gout flair for which rheumatology was consulted. Left knee aspiration confirmed diagnosis. He was discharged to home about 10 days ago but has since been in the ED with ongoing gout. Rheumatology consulted and recommended a dose of prednisone and more aggressive colchicine. If pain persisted, consider for IR guided steroid injection. Mr. Chu returns to clinic for follow up.    Mr. Chu saw rheumatology today for gout and was given a prn dose of prednisone. In general, his pain is better and he is feeling quite well. He denies shortness of breath and is only limited by gout pain. No orthopnea, PND, chest pain, palpitations, or swelling. Appetite is great.     Fabiano reports his driveline site is red, though he thinks related to the adhesive dressing. No fevers or chills. No bleeding. No focal deficits.    Fabiano reports having a urinary stricture previously dilated by urology team and is requesting a referral due to recent complaints of discomfort and the sensation of not emptying with urination. No burning, hematuria, or malodorous urine.    PAST MEDICAL HISTORY:  Past Medical History:   Diagnosis Date     Acute systolic congestive heart failure (H) 2017     Diabetes (H)      HTN (hypertension)      Hyperlipidemia      Mitral regurgitation      Nocturnal oxygen desaturation 3/29/2018     Nonischemic cardiomyopathy (H) 2017     SHIRLEY (obstructive sleep apnea)      Pacemaker 2017    ICD 17       FAMILY HISTORY:  Family History   Problem Relation Age of Onset     Heart Failure Father          at age 86     Heart Failure Paternal Uncle          at 66      Myocardial Infarction Paternal Uncle           at age 62     Coronary Artery Disease Mother          during CABG at age 41       SOCIAL HISTORY:  Social History     Social History     Marital status: Single     Spouse name: N/A     Number of children: N/A     Years of education: N/A     Social History Main Topics     Smoking status: Former Smoker     Smokeless tobacco: Never Used      Comment: quit 3/2017     Alcohol use None      Comment: social     Drug use: None     Sexual activity: Not Asked     Other Topics Concern     None     Social History Narrative       CURRENT MEDICATIONS:   Current Outpatient Medications on File Prior to Visit:  acetaminophen (TYLENOL) 325 MG tablet Take 2 tablets (650 mg) by mouth every 6 hours as needed for pain   artificial saliva (BIOTENE DRY MOUTHWASH) LIQD liquid Swish and spit 10 mLs in mouth 4 times daily as needed for dry mouth   aspirin (ASA) 81 MG chewable tablet Take 1 tablet (81 mg) by mouth daily   Blood Glucose Monitoring Suppl (BLOOD GLUCOSE MONITOR SYSTEM) w/Device KIT three times a week   bumetanide (BUMEX) 1 MG tablet Take 1 tablet (1 mg) by mouth 2 times daily   hydrocortisone (CORTAID) 1 % external cream Apply topically 2 times daily as needed for itching   losartan (COZAAR) 25 MG tablet Take 1 tablet (25 mg) by mouth daily   melatonin 3 MG tablet Take 1 tablet (3 mg) by mouth At Bedtime   nystatin (MYCOSTATIN) 106376 UNIT/ML suspension Take 5 mLs (500,000 Units) by mouth 4 times daily for 9 days   potassium chloride ER (K-DUR/KLOR-CON M) 20 MEQ CR tablet Take 1 tablet (20 mEq) by mouth 2 times daily   senna-docusate (SENOKOT-S/PERICOLACE) 8.6-50 MG tablet Take 2 tablets by mouth daily as needed for constipation   traZODone (DESYREL) 100 MG tablet Take 1 tablet (100 mg) by mouth nightly as needed for sleep   vitamin B1 (THIAMINE) 100 MG tablet Take 1 tablet (100 mg) by mouth daily   warfarin (COUMADIN) 2 MG tablet Take 3 mg PO daily at 6 pm until next INR check, then dose per INR goal 2-3   omeprazole  "(PRILOSEC) 20 MG DR capsule Take 1 capsule (20 mg) by mouth every morning (before breakfast) (Patient not taking: Reported on 12/26/2018)     No current facility-administered medications on file prior to visit.     ROS:   Constitutional: No fever, chills, or sweats. Stable weight.   ENT: No visual disturbance, ear ache, epistaxis, sore throat.   Allergies/Immunologic: Negative.   Respiratory: No cough, hemoptysis.   Cardiovascular: As per HPI.   GI: No nausea, vomiting, hematemesis, melena, or hematochezia.   : No urinary frequency, dysuria, or hematuria.   Integument: Negative.   Psychiatric: As per HPI.  Neuro: Negative.   Endocrinology: Negative.   Musculoskeletal: As per HPI.    EXAM:  BP (!) 82/0 (BP Location: Right arm, Patient Position: Chair, Cuff Size: Adult Regular)   Pulse 78   Ht 1.753 m (5' 9\")   Wt 81.8 kg (180 lb 6.4 oz)   SpO2 98%   BMI 26.64 kg/m    General: appears comfortable, alert and articulate  Head: normocephalic, atraumatic  Eyes: anicteric sclera, EOMI  Neck: no adenopathy  Orophyarynx: moist mucosa, no lesions, dentition intact  Heart: mechanical hum of LVAD, JVP is flat  Lungs: clear, no rales or wheezing  Abdomen: soft, non-tender, bowel sounds present, no hepatomegaly  Extremities: no clubbing, cyanosis or LE edema  Neurological: normal speech and affect, no gross motor deficits    VAD Interrogation on 12/26/18: VAD interrogation reviewed with VAD coordinator. Agree with findings. No PI events, power spikes, speed drops, or other findings suspicious of pump malfunction noted.     Labs:  Reviewed INR, uric acid, CMP, CBC      Assessment and Plan:   Mr. Danielito Chu is a 63yr old male with advanced heart failure now s/p HM3 LVAD who is doing well. We'll reduce Bumex to 1 mg daily and potassium to 20 mEq daily. He will begin Toprol 12.5 mg once daily. Repeat labs with his next INR and return to clinic in 2 weeks.    1. Chronic systolic heart failure secondary to NICM  Stage D  NYHA " Class III  ACEi/ARB:  Intolerant; is on hydralazine  BB:  Starting low dose today given some NSVT on device check   Aldosterone antagonist:  Deferred due to h/o hyperkalemia and while other therapy is optimized today  SCD prophylaxis:  ICD  Fluid status:  euvolemic to dry  Sleep Apnea Evaluation:  Reports central sleep apnea, but does not tolerate CPAP.  Advised that he consider repeating sleep study and evaluating possible options for treatment.    2. S/p LVAD  MAP 82 today. Adding Toprol which should help a bit   today  Anticoag: per AC clinic. INR is supra-therapeutic today  Antiplatelet: ASA 81 mg daily  Driveline: Stable      3. H/O NSVT. Episode on October with >200 episodes of NSVT. Device check today only showed 2 NSVT episodes. Restarting low dose beta blocker today    4. Gout. Managed by rheumatology. Uric acid level improved today     4. Leukocytosis. WBC count up, though in the setting of gout flare. Repeat labs with next INR    35 minutes spent in direct care, >50% in counseling          CC  Patient Care Team:  Bryan Orellana MD as PCP - General  AcworthLorena MD as MD (Cardiology)  Sparkle Joel, RN as Nurse Coordinator (Cardiology)  Cecelia Palacios, RN as Nurse Coordinator (Cardiology)  Deysi Mattson APRN CNP as Nurse Practitioner (Cardiology)  Kalli Purcell APRN CNP as Nurse Practitioner (Cardiology)  Giselle Johnson, ARAVIND as Nurse Coordinator (Cardiology)  Candido Mendez MD as MD (Gastroenterology)  SELF, REFERRED

## 2018-12-26 NOTE — LETTER
12/26/2018       RE: Danielito Chu  [address] [place] [address]     Dear Colleague,    Thank you for referring your patient, Danielito Chu, to the [facility] RHEUMATOLOGY at [facility]. Please see a copy of my visit note below.    Rheumatology New patient Note    Reason for referral: Erosive crystal proven gout, f/u hospitalization    Referring physician: Bryan Orellana      DOS: 12/26/2018       HPI:    Danielito Chu is a 63 year old male. He is here for initial visit, f/u hospitalization and to establish care for his gout. He lives far away and has to coordinate his visits to the [facility].    He was initially seen for gout consult on 12/13/2018, L knee was injected with steroid and lidocaine and he received anakinra for gout mx. He was started on allopurinol and colcrys. Tolertaes meds well.Had another ER visit on 12/21 when he received one dose of prednisone 40 mg every day for gout flare of R foot.    Today, is feeling well, has mild residual tenderness of the R foot.      Past Medical History:   Diagnosis Date     Acute systolic congestive heart failure (H) 4/5/2017     Diabetes (H)      HTN (hypertension)      Hyperlipidemia      Liver disease      LVAD (left ventricular assist device) present (H)     HM 3 12/18     Marijuana abuse      Mitral regurgitation      Nocturnal oxygen desaturation 3/29/2018     Nonischemic cardiomyopathy (H) 4/5/2017     SHIRLEY (obstructive sleep apnea)      Pacemaker 04/07/2017    ICD 4/7/17     Past Surgical History:   Procedure Laterality Date     ESOPHAGOSCOPY, GASTROSCOPY, DUODENOSCOPY (EGD), COMBINED N/A 11/21/2018    Procedure: COMBINED ESOPHAGOSCOPY, GASTROSCOPY, DUODENOSCOPY (EGD);  Surgeon: Candido Mendez MD;  Location:  GI     IMPLANT IMPLANTABLE CARDIOVERTER DEFIBRILLATOR       INSERT VENTRICULAR ASSIST DEVICE LEFT (HEARTMATE II/III) MINIMALLY INVASIVE N/A 12/5/2018    Procedure: Partial Median Sternotomy,  Left Thoracotomy, Minimally Invasive Left Ventricular Assist Device Placement (Heartmate III), On Cardiopulmonary Bypass;  Surgeon: Andrei Silva MD;  Location:  OR     Family History   Problem Relation Age of Onset     Heart Failure Father          at age 86     Heart Failure Paternal Uncle          at 66      Myocardial Infarction Paternal Uncle          at age 62     Coronary Artery Disease Mother          during CABG at age 41     Social History     Socioeconomic History     Marital status: Single     Spouse name: Not on file     Number of children: Not on file     Years of education: Not on file     Highest education level: Not on file   Social Needs     Financial resource strain: Not on file     Food insecurity - worry: Not on file     Food insecurity - inability: Not on file     Transportation needs - medical: Not on file     Transportation needs - non-medical: Not on file   Occupational History     Not on file   Tobacco Use     Smoking status: Former Smoker     Smokeless tobacco: Never Used     Tobacco comment: quit 3/2017   Substance and Sexual Activity     Alcohol use: No     Frequency: Never     Comment: social     Drug use: No     Sexual activity: Not on file   Other Topics Concern     Parent/sibling w/ CABG, MI or angioplasty before 65F 55M? Not Asked   Social History Narrative     Not on file     Patient Active Problem List   Diagnosis     CHF (congestive heart failure) (H)     Acute systolic congestive heart failure (H)     Nonischemic cardiomyopathy (H)     Cardiac defibrillator, [employer], Single Chamber- NOT dependent     Nocturnal oxygen desaturation     Acute on chronic systolic CHF (congestive heart failure) (H)     Heart failure with acute decompensation, type unknown (H)     Acute on chronic systolic heart failure (H)     Heart failure (H)     LVAD (left ventricular assist device) present (H)     No Known Allergies    Outpatient Encounter Medications as of  2018   Medication Sig Dispense Refill     allopurinol (ZYLOPRIM) 100 MG tablet Take 2 tablets (200 mg) by mouth daily 60 tablet 5     colchicine (COLCYRS) 0.6 MG tablet Take 1 tablet (0.6 mg) by mouth 2 times daily 60 tablet 3     [DISCONTINUED] predniSONE (DELTASONE) 20 MG tablet Take 40 mg by mouth once as needed. (Patient not taking: Reported on 1/10/2019.) 5 tablet 0     [] acetaminophen (TYLENOL) 325 MG tablet Take 2 tablets (650 mg) by mouth every 6 hours as needed for pain 100 tablet 0     artificial saliva (BIOTENE DRY MOUTHWASH) LIQD liquid Swish and spit 10 mLs in mouth 4 times daily as needed for dry mouth 59 mL 0     aspirin (ASA) 81 MG chewable tablet Take 1 tablet (81 mg) by mouth daily 120 tablet 0     Blood Glucose Monitoring Suppl (BLOOD GLUCOSE MONITOR SYSTEM) w/Device KIT three times a week       [] hydrocortisone (CORTAID) 1 % external cream Apply topically 2 times daily as needed for itching 1 g 0     [] melatonin 3 MG tablet Take 1 tablet (3 mg) by mouth At Bedtime 30 tablet 0     [] nystatin (MYCOSTATIN) 262944 UNIT/ML suspension Take 5 mLs (500,000 Units) by mouth 4 times daily for 9 days 180 mL 0     omeprazole (PRILOSEC) 20 MG DR capsule Take 1 capsule (20 mg) by mouth every morning (before breakfast) 90 capsule 3     [] senna-docusate (SENOKOT-S/PERICOLACE) 8.6-50 MG tablet Take 2 tablets by mouth daily as needed for constipation 60 tablet 0     traZODone (DESYREL) 100 MG tablet Take 1 tablet (100 mg) by mouth nightly as needed for sleep 30 tablet 0     vitamin B1 (THIAMINE) 100 MG tablet Take 1 tablet (100 mg) by mouth daily 30 tablet 0     warfarin (COUMADIN) 2 MG tablet Take 3 mg PO daily at 6 pm until next INR check, then dose per INR goal 2-3 150 tablet 1     [DISCONTINUED] allopurinol (ZYLOPRIM) 100 MG tablet Take 2 tablets (200 mg) by mouth daily 90 tablet 0     [DISCONTINUED] bumetanide (BUMEX) 1 MG tablet Take 1 tablet (1 mg) by mouth 2  times daily 60 tablet 0     [DISCONTINUED] colchicine (COLCYRS) 0.6 MG tablet Take 1 tablet (0.6 mg) by mouth daily 60 tablet 0     [DISCONTINUED] losartan (COZAAR) 25 MG tablet Take 1 tablet (25 mg) by mouth daily 30 tablet 0     [DISCONTINUED] potassium chloride ER (K-DUR/KLOR-CON M) 20 MEQ CR tablet Take 1 tablet (20 mEq) by mouth 2 times daily 90 tablet 0     No facility-administered encounter medications on file as of 12/26/2018.            ROS:  A comprehensive ROS was done, positives are per HPI.        His records were reviewed.    Results for orders placed or performed in visit on 12/22/18   INR   Result Value Ref Range    INR 2.61 (H) 0.86 - 1.14   Lactate Dehydrogenase   Result Value Ref Range    Lactate Dehydrogenase 276 (H) 85 - 227 U/L   CBC with platelets   Result Value Ref Range    WBC 14.3 (H) 4.0 - 11.0 10e9/L    RBC Count 3.79 (L) 4.4 - 5.9 10e12/L    Hemoglobin 11.0 (L) 13.3 - 17.7 g/dL    Hematocrit 36.8 (L) 40.0 - 53.0 %    MCV 97 78 - 100 fl    MCH 29.0 26.5 - 33.0 pg    MCHC 29.9 (L) 31.5 - 36.5 g/dL    RDW 19.2 (H) 10.0 - 15.0 %    Platelet Count 673 (H) 150 - 450 10e9/L   Comprehensive metabolic panel   Result Value Ref Range    Sodium 135 133 - 144 mmol/L    Potassium 3.7 3.4 - 5.3 mmol/L    Chloride 100 94 - 109 mmol/L    Carbon Dioxide 29 20 - 32 mmol/L    Anion Gap 6 3 - 14 mmol/L    Glucose 117 (H) 70 - 99 mg/dL    Urea Nitrogen 15 7 - 30 mg/dL    Creatinine 0.76 0.66 - 1.25 mg/dL    GFR Estimate >90 >60 mL/min/[1.73_m2]    GFR Estimate If Black >90 >60 mL/min/[1.73_m2]    Calcium 8.4 (L) 8.5 - 10.1 mg/dL    Bilirubin Total 0.9 0.2 - 1.3 mg/dL    Albumin 2.7 (L) 3.4 - 5.0 g/dL    Protein Total 7.6 6.8 - 8.8 g/dL    Alkaline Phosphatase 166 (H) 40 - 150 U/L    ALT 50 0 - 70 U/L    AST 65 (H) 0 - 45 U/L   Uric acid   Result Value Ref Range    Uric Acid 3.8 3.5 - 7.2 mg/dL     Component      Latest Ref Rng & Units 10/24/2018 11/13/2018 12/22/2018   Uric Acid      3.5 - 7.2 mg/dL 12.9 (H)  12.0 (H) 3.8     Exam: XR FOOT PORT BILATERAL 3 VW, 12/13/2018 3:51 PM     Indication: eval for evidence of gouty erosions     Comparison: None     Findings:   Right: No acute osseous abnormality. Moderate degenerative changes of  the first metatarsophalangeal joint with adjacent nonspecific  mineralization. Possible erosions seen on the lateral view of the  distal first metatarsal. Vascular calcification. Diffuse soft tissue  swelling/stranding.     Left: No acute osseous thoracic. Small erosion at the first  metatarsophalangeal joint. Mild scattered degenerative  change.  Nonspecific cortical irregularity at the base of the fifth metatarsal  may be from chronic trauma or less likely erosion. Diffuse soft tissue  swelling/stranding.                                                                      Impression: Small erosion of the left first metatarsophalangeal joint  with possible erosion of the first metatarsophalangeal joint on the  right.     SHAY GONZALEZ MD        Ph.E:    Pulse 64   Temp 97.4  F (36.3  C) (Oral)   Wt 82.1 kg (181 lb)   SpO2 98%   BMI 25.97 kg/m         Constitutional: WD/WN. Pleasant. In no acute distress.  Eyes: EOM intact, PERRLA, sclera anicteric, conj not injected  HEENT: No oral ulcers or thrush.   Neck: No cervical LAP or thyromegaly  Chest: Clear to auscultation bilaterally  CV: RRR, no murmurs/ rubs or gallops. No edema, clubbing or cyanosis.   GI: Abdomen is soft and non tender.   MS: No synovitis. Cool joints. Mild MTP tenderness.No tophi.  Skin: No skin rash  Neuro: A&O x 3. Grossly non focal  Psych: NL affect and mood    Assessment/ plan:    Crystal proven erosive gout: 62 yo M with history of DM2, CHF s/p LVAD (12/5/18), crystal proven gout (based on L knee artherocentesis 12/13/2018) with evidence of mild erosions of 1st metatarsals on foot xray. He was initially seen for gout consult on 12/13/2018, L knee was injected with steroid and lidocaine and he received anakinra  100 mg every day x 5 days for gout mx. He was started on allopurinol 200 mg qd and colcrys 0.6 mg qd. Tolertaes meds well.Had another ER visit on 12/21 when he received one dose of prednisone 40 mg every day for gout flare of R foot.    Today, is feeling well, has mild residual tenderness of the R foot.     DIAGNOSIS:  1. Crystal proven gouty arthritis (affected joints include bilateral MTPs, right midfoot and ankle, left knee), recovering from another flare which started on 12/21.       Plan:    Will check serum uric acid, goal is to keep it below 6. Last SUA on 12/22 was 3.8 but it could be falsely down during the flare.    Continue allopurinol 200 mg a day, increase colcrys from 0.6 mg every day to twice a day    Allopurinol and colcrys monitoring labs q6mo, recent labs were reviewed.    Recomemnd prednisone 40 mg a day x once if pain becomes not tolerable and call me    F/U Jan 16 at 1:15 am add on        Melody Nassar MD

## 2018-12-26 NOTE — PATIENT INSTRUCTIONS
Medications:  1. Decrease frequency of Bumex.  Take one tablet (1mg) one time per day.   2. Decrease frequency of potassium.  Take one tablet (20mEq) on time per day.  3. START taking Toprol 12.5mg (half tablet) one time per day.     Follow-up:  1. You already have appointments scheduled for clinic.  2. Get full set of labs with next INR check (VAD Coordinator will send order).    Instructions:  1. Keep up the great work!    Page the VAD Coordinator on call if you gain more than 3 lb in a day or 5 in a week. Please also page if you feel unwell or have alarms.     Great to see you in clinic today. To Page the VAD Coordinator on call, dial [phone] option #4 and ask to speak to the VAD coordinator on call.

## 2018-12-26 NOTE — NURSING NOTE
Vitals completed successfully and medication reconciled. Monet Saravia MA  Chief Complaint   Patient presents with     Follow Up     VAD F/U

## 2018-12-26 NOTE — NURSING NOTE
1). PUMP DATA  Primary controller serial number: [id number]    HM 3:   Flow: 4.9 L/min,    Speed: 5500 RPMs,     PI: 2.9 ,  Power: 4.4 [place], Hct: pt did not get hematocrit drawn today.     Primary controller   Back up battery: Patient use: 10, Replace in: 30  Months     Data downloaded: No   Equipment and driveline assessed for damage: Yes     Back up : Patient did not bring back up bag.  Patient reported that he and caregiver forgot.  Reeducated patient on importance of keeping within arms distance at all times; pt and caregiver verbalized understanding.   Serial number: n/a  Back up battery: Patient use: n/a Replace in: n/a  Months  Programmed settings identical to the settings on the primary controller :n/a     Education complete: Yes   Charge the BACKUP controller s backup battery every 6 months  Perform a self test on BACKUP every 6 months  Change the MPU s batteries every 6 months:Yes        2). ALARMS  Alarms reported by patient since last pump evaluation: No  Alarms or other finding noted during pump data history and alarm download: Yes, rare PI events with no speed drops.    Action Taken:  Reviewed data with patient: Yes      3). DRESSING CHANGE / DRIVELINE ASSESSMENT  Dressing change completed today: Yes  Appearance of Driveline site: C/D/I with suture still in place.    Driveline stabilization: Method: [employer]  [ Teaching reinforced on need for stabilization of Driveline. ]    4).Pt. Education    D:  Pt education provided.  I:  Pt educated on the following topics:  1. When to call 911.  Reviewed emergency situations.  2. What to do if my VAD Coordinator is not returning my call.  3. When to page the VAD Coordinator on Call (handout provided w/ frequent symptoms to report).  4. Pt practice paged the VAD Coordinator on Call.   5. Pt given page of stickers with the number for the VAD Coordinator on Call.  6. Pt given list of frequently used resources and their numbers.  Reviewed when to  call each resource.  (Social Work, Financial Counselor, Coumadin Clinic, Device Nurse, ).  A:  Pt verbalized understanding.  P:  VAD Coordinator available for questions or concerns.  Will continue VAD education.

## 2018-12-26 NOTE — NURSING NOTE
"Unable to get Blood Pressure, Manually of with vital stand    Chief Complaint   Patient presents with     Consult     consult gout     Vital signs:  Temp: 97.4  F (36.3  C) Temp src: Oral   Pulse: 64     SpO2: 98 %       Weight: 82.1 kg (181 lb)  Estimated body mass index is 25.97 kg/m  as calculated from the following:    Height as of 12/5/18: 1.778 m (5' 10\").    Weight as of this encounter: 82.1 kg (181 lb).      Ashley Mohr    "

## 2018-12-26 NOTE — LETTER
12/26/2018      RE: Danielito Chu  [id number] [place] [address]       Dear Colleague,    Thank you for the opportunity to participate in the care of your patient, Danielito Chu, at the [facility] at [facility]. Please see a copy of my visit note below.    HPI:   Mr. Danielito Chu is a 63yr old male with a history of NICM (LVEF at dong of 15%) s/p ICD (4/2017), HTN, SHIRLEY, and HL who underwent HM3 LVAD implant on 12/5/18. Hospital course was notable for postoperative flolan and pressor support. He later required niptire for HTN control and developed an acute gout flair for which rheumatology was consulted. Left knee aspiration confirmed diagnosis. He was discharged to home about 10 days ago but has since been in the ED with ongoing gout. Rheumatology consulted and recommended a dose of prednisone and more aggressive colchicine. If pain persisted, consider for IR guided steroid injection. Mr. Chu returns to clinic for follow up.    Mr. Chu saw rheumatology today for gout and was given a prn dose of prednisone. In general, his pain is better and he is feeling quite well. He denies shortness of breath and is only limited by gout pain. No orthopnea, PND, chest pain, palpitations, or swelling. Appetite is great.     Fabiano reports his driveline site is red, though he thinks related to the adhesive dressing. No fevers or chills. No bleeding. No focal deficits.    Fabiano reports having a urinary stricture previously dilated by urology team and is requesting a referral due to recent complaints of discomfort and the sensation of not emptying with urination. No burning, hematuria, or malodorous urine.    PAST MEDICAL HISTORY:  Past Medical History:   Diagnosis Date     Acute systolic congestive heart failure (H) 4/5/2017     Diabetes (H)      HTN (hypertension)      Hyperlipidemia      Mitral regurgitation      Nocturnal oxygen desaturation 3/29/2018      Nonischemic cardiomyopathy (H) 2017     SHIRLEY (obstructive sleep apnea)      Pacemaker 2017    ICD 17       FAMILY HISTORY:  Family History   Problem Relation Age of Onset     Heart Failure Father          at age 86     Heart Failure Paternal Uncle          at 66      Myocardial Infarction Paternal Uncle          at age 62     Coronary Artery Disease Mother          during CABG at age 41       SOCIAL HISTORY:  Social History     Social History     Marital status: Single     Spouse name: N/A     Number of children: N/A     Years of education: N/A     Social History Main Topics     Smoking status: Former Smoker     Smokeless tobacco: Never Used      Comment: quit 3/2017     Alcohol use None      Comment: social     Drug use: None     Sexual activity: Not Asked     Other Topics Concern     None     Social History Narrative       CURRENT MEDICATIONS:   Current Outpatient Medications on File Prior to Visit:  acetaminophen (TYLENOL) 325 MG tablet Take 2 tablets (650 mg) by mouth every 6 hours as needed for pain   artificial saliva (BIOTENE DRY MOUTHWASH) LIQD liquid Swish and spit 10 mLs in mouth 4 times daily as needed for dry mouth   aspirin (ASA) 81 MG chewable tablet Take 1 tablet (81 mg) by mouth daily   Blood Glucose Monitoring Suppl (BLOOD GLUCOSE MONITOR SYSTEM) w/Device KIT three times a week   bumetanide (BUMEX) 1 MG tablet Take 1 tablet (1 mg) by mouth 2 times daily   hydrocortisone (CORTAID) 1 % external cream Apply topically 2 times daily as needed for itching   losartan (COZAAR) 25 MG tablet Take 1 tablet (25 mg) by mouth daily   melatonin 3 MG tablet Take 1 tablet (3 mg) by mouth At Bedtime   nystatin (MYCOSTATIN) 393508 UNIT/ML suspension Take 5 mLs (500,000 Units) by mouth 4 times daily for 9 days   potassium chloride ER (K-DUR/KLOR-CON M) 20 MEQ CR tablet Take 1 tablet (20 mEq) by mouth 2 times daily   senna-docusate (SENOKOT-S/PERICOLACE) 8.6-50 MG tablet Take 2 tablets by  "mouth daily as needed for constipation   traZODone (DESYREL) 100 MG tablet Take 1 tablet (100 mg) by mouth nightly as needed for sleep   vitamin B1 (THIAMINE) 100 MG tablet Take 1 tablet (100 mg) by mouth daily   warfarin (COUMADIN) 2 MG tablet Take 3 mg PO daily at 6 pm until next INR check, then dose per INR goal 2-3   omeprazole (PRILOSEC) 20 MG DR capsule Take 1 capsule (20 mg) by mouth every morning (before breakfast) (Patient not taking: Reported on 12/26/2018)     No current facility-administered medications on file prior to visit.     ROS:   Constitutional: No fever, chills, or sweats. Stable weight.   ENT: No visual disturbance, ear ache, epistaxis, sore throat.   Allergies/Immunologic: Negative.   Respiratory: No cough, hemoptysis.   Cardiovascular: As per HPI.   GI: No nausea, vomiting, hematemesis, melena, or hematochezia.   : No urinary frequency, dysuria, or hematuria.   Integument: Negative.   Psychiatric: As per HPI.  Neuro: Negative.   Endocrinology: Negative.   Musculoskeletal: As per HPI.    EXAM:  BP (!) 82/0 (BP Location: Right arm, Patient Position: Chair, Cuff Size: Adult Regular)   Pulse 78   Ht 1.753 m (5' 9\")   Wt 81.8 kg (180 lb 6.4 oz)   SpO2 98%   BMI 26.64 kg/m     General: appears comfortable, alert and articulate  Head: normocephalic, atraumatic  Eyes: anicteric sclera, EOMI  Neck: no adenopathy  Orophyarynx: moist mucosa, no lesions, dentition intact  Heart: mechanical hum of LVAD, JVP is flat  Lungs: clear, no rales or wheezing  Abdomen: soft, non-tender, bowel sounds present, no hepatomegaly  Extremities: no clubbing, cyanosis or LE edema  Neurological: normal speech and affect, no gross motor deficits    VAD Interrogation on 12/26/18: VAD interrogation reviewed with VAD coordinator. Agree with findings. No PI events, power spikes, speed drops, or other findings suspicious of pump malfunction noted.     Labs:  Reviewed INR, uric acid, CMP, CBC      Assessment and Plan:   " Danielito Chu is a 63yr old male with advanced heart failure now s/p HM3 LVAD who is doing well. We'll reduce Bumex to 1 mg daily and potassium to 20 mEq daily. He will begin Toprol 12.5 mg once daily. Repeat labs with his next INR and return to clinic in 2 weeks.    1. Chronic systolic heart failure secondary to NICM  Stage D  NYHA Class III  ACEi/ARB:  Intolerant; is on hydralazine  BB:  Starting low dose today given some NSVT on device check   Aldosterone antagonist:  Deferred due to h/o hyperkalemia and while other therapy is optimized today  SCD prophylaxis:  ICD  Fluid status:  euvolemic to dry  Sleep Apnea Evaluation:  Reports central sleep apnea, but does not tolerate CPAP.  Advised that he consider repeating sleep study and evaluating possible options for treatment.    2. S/p LVAD  MAP 82 today. Adding Toprol which should help a bit   today  Anticoag: per AC clinic. INR is supra-therapeutic today  Antiplatelet: ASA 81 mg daily  Driveline: Stable      3. H/O NSVT. Episode on October with >200 episodes of NSVT. Device check today only showed 2 NSVT episodes. Restarting low dose beta blocker today    4. Gout. Managed by rheumatology. Uric acid level improved today     4. Leukocytosis. WBC count up, though in the setting of gout flare. Repeat labs with next INR    35 minutes spent in direct care, >50% in counseling      Please do not hesitate to contact me if you have any questions/concerns.     Sincerely,     Minoo Lopez NP      CC  Patient Care Team:  Bryan Orellana MD as PCP - General  Fenton, oLrena Lindsey MD as MD (Cardiology)  Sparkle Joel, RN as Nurse Coordinator (Cardiology)  Cecelia Palacios, RN as Nurse Coordinator (Cardiology)  Deysi Mattson APRN CNP as Nurse Practitioner (Cardiology)  Kalli Purcell APRN CNP as Nurse Practitioner (Cardiology)  Giselle Johnson, ARAVIND as Nurse Coordinator (Cardiology)  Candido Mendez MD as MD (Gastroenterology)

## 2018-12-28 ENCOUNTER — CARE COORDINATION (OUTPATIENT)
Dept: CARDIOLOGY | Facility: CLINIC | Age: 63
End: 2018-12-28

## 2018-12-28 ENCOUNTER — ANTICOAGULATION THERAPY VISIT (OUTPATIENT)
Dept: ANTICOAGULATION | Facility: CLINIC | Age: 63
End: 2018-12-28

## 2018-12-28 DIAGNOSIS — I50.22 CHRONIC SYSTOLIC HEART FAILURE (H): ICD-10-CM

## 2018-12-28 DIAGNOSIS — Z95.811 LVAD (LEFT VENTRICULAR ASSIST DEVICE) PRESENT (H): ICD-10-CM

## 2018-12-28 LAB
ALBUMIN SERPL-MCNC: 2.9 G/DL (ref 3.4–5)
ALP SERPL-CCNC: 168 U/L (ref 40–150)
ALT SERPL W P-5'-P-CCNC: 29 U/L (ref 0–70)
ANION GAP SERPL CALCULATED.3IONS-SCNC: 6 MMOL/L (ref 3–14)
AST SERPL W P-5'-P-CCNC: 20 U/L (ref 0–45)
BILIRUB SERPL-MCNC: 0.8 MG/DL (ref 0.2–1.3)
BUN SERPL-MCNC: 8 MG/DL (ref 7–30)
CALCIUM SERPL-MCNC: 8.5 MG/DL (ref 8.5–10.1)
CHLORIDE SERPL-SCNC: 102 MMOL/L (ref 94–109)
CO2 SERPL-SCNC: 28 MMOL/L (ref 20–32)
CREAT SERPL-MCNC: 0.72 MG/DL (ref 0.66–1.25)
ERYTHROCYTE [DISTWIDTH] IN BLOOD BY AUTOMATED COUNT: 18.4 % (ref 10–15)
GFR SERPL CREATININE-BSD FRML MDRD: >90 ML/MIN/{1.73_M2}
GLUCOSE SERPL-MCNC: 88 MG/DL (ref 70–99)
HCT VFR BLD AUTO: 36.2 % (ref 40–53)
HGB BLD-MCNC: 11.2 G/DL (ref 13.3–17.7)
INR PPP: 4.07 (ref 0.86–1.14)
LDH SERPL L TO P-CCNC: 207 U/L (ref 85–227)
MCH RBC QN AUTO: 29.2 PG (ref 26.5–33)
MCHC RBC AUTO-ENTMCNC: 30.9 G/DL (ref 31.5–36.5)
MCV RBC AUTO: 95 FL (ref 78–100)
PLATELET # BLD AUTO: 465 10E9/L (ref 150–450)
POTASSIUM SERPL-SCNC: 3.7 MMOL/L (ref 3.4–5.3)
PROT SERPL-MCNC: 7.7 G/DL (ref 6.8–8.8)
RBC # BLD AUTO: 3.83 10E12/L (ref 4.4–5.9)
SODIUM SERPL-SCNC: 136 MMOL/L (ref 133–144)
WBC # BLD AUTO: 10.4 10E9/L (ref 4–11)

## 2018-12-28 NOTE — PROGRESS NOTES
ANTICOAGULATION FOLLOW-UP CLINIC VISIT    Patient Name:  Danielito Chu  Date:  2018  Contact Type:  Telephone    SUBJECTIVE:     Patient Findings     Positives:   Change in medications (Taking allopurinol and colchicine for gout flare, which is subsiding.)           OBJECTIVE    INR   Date Value Ref Range Status   2018 4.07 (H) 0.86 - 1.14 Final     Factor 2 Assay   Date Value Ref Range Status   10/27/2018 70 60 - 140 % Final     Comment:     The Factor 2 activity level is not a screening test for the Prothrombin 33005   mutation.         ASSESSMENT / PLAN  INR assessment SUPRA    Recheck INR In: 3 DAYS    INR Location Clinic      Anticoagulation Summary  As of 2018    INR goal:   2.0-3.0   TTR:   43.2 % (6 d)   INR used for dosin.07! (2018)   Warfarin maintenance plan:   No maintenance plan   Full warfarin instructions:   : 2 mg; : 3 mg; : 3 mg   Plan last modified:   Kristie Fernandez RN (2018)   Next INR check:   2018   Priority:   INR   Target end date:   Indefinite    Indications    LVAD (left ventricular assist device) present (H) [Z95.811]             Anticoagulation Episode Summary     INR check location:       Preferred lab:       Send INR reminders to:   [facility]    Comments:   LVAD placed 18 ASA 81mg Daily Has Jantoven 2mg tablets   Please speak with sister Fani  [phone]  Pt will be staying at the [facility] until he is stable to go home.      Anticoagulation Care Providers     Provider Role Specialty Phone number    Lorena Watkins MD [facility] Cardiology [phone]            See the Encounter Report to view Anticoagulation Flowsheet and Dosing Calendar (Go to Encounters tab in chart review, and find the Anticoagulation Therapy Visit)    Spoke with Fani, and patient.    Lorena Kamara RN

## 2018-12-28 NOTE — PROGRESS NOTES
"Called patient/caregiver to check in 2 weeks post discharge. Pt reports VAD parameters fine and weight up three pounds but \"I'm eating a lot and my appetite is great\". Reviewed medications and answered any questions. Patient reports sleeping well and no anxiety since being home with LVAD. Patient is fully able to move around the house and care for him/herself.  He said he walked six blocks yesterday easily.     Discussed specific new problems/stressors since being discharged from the hospital: \"getting the INR check schedule figured out\" Empathized with patient and reviewed coping strategies: enlisting support from friends and love ones, attending patient and caregiver support groups, reviewing LVAD educational materials to reinforce knowledge, and talking about concerns with family/care providers/trusted others. Encouraged pt to page VAD Coordinator with any issues or questions. Pt verbalizes understanding.    Fabiano asked about when his brother Matti and friend Angel could get their training finished up. We will plan to have two teaching sessions in the first week of January.    With Fani and Fabiano I emphasized that they should get his INR checked earlier in the day, then be sure to answer the call when the INR clinic calls. If they do not hear from the INR clinic, they should call them if it is before 4pm or the VAD coordinator if it is after 4pm to get that evening's dosing.  "

## 2018-12-31 ENCOUNTER — ANTICOAGULATION THERAPY VISIT (OUTPATIENT)
Dept: ANTICOAGULATION | Facility: CLINIC | Age: 63
End: 2018-12-31

## 2018-12-31 DIAGNOSIS — Z95.811 LVAD (LEFT VENTRICULAR ASSIST DEVICE) PRESENT (H): ICD-10-CM

## 2018-12-31 LAB — INR PPP: 3.8 (ref 0.86–1.14)

## 2018-12-31 NOTE — PROGRESS NOTES
ANTICOAGULATION FOLLOW-UP CLINIC VISIT    Patient Name:  Danielito Chu  Date:  12/31/2018  Contact Type:  Telephone    SUBJECTIVE:     Patient Findings     Positives:   Change in medications    Comments:   Pt continues taking Colchicine and Allopurinol for a gout flare. Pt also is getting his appetite back and reports he has gained 4-6lbs. Spoke with sister Fani and there was questions about vit k intake and salads. Writer educated Fani about keeping vit k intake consistent. Writer will be mailing a packet with information about interactions with Warfarin and diet.            OBJECTIVE    INR   Date Value Ref Range Status   12/31/2018 3.80 (H) 0.86 - 1.14 Final     Factor 2 Assay   Date Value Ref Range Status   10/27/2018 70 60 - 140 % Final     Comment:     The Factor 2 activity level is not a screening test for the Prothrombin 86149   mutation.         ASSESSMENT / PLAN  INR assessment SUPRA    Recheck INR In: 2 DAYS    INR Location Clinic      Anticoagulation Summary  As of 12/31/2018    INR goal:   2.0-3.0   TTR:   29.7 % (1.3 wk)   INR used for dosing:   3.80! (12/31/2018)   Warfarin maintenance plan:   No maintenance plan   Full warfarin instructions:   12/31: 2 mg; 1/1: 2 mg   Plan last modified:   Kristie Fernandez, RN (12/20/2018)   Next INR check:   1/2/2019   Priority:   INR   Target end date:   Indefinite    Indications    LVAD (left ventricular assist device) present (H) [Z95.811]             Anticoagulation Episode Summary     INR check location:       Preferred lab:       Send INR reminders to:    [facility]    Comments:   LVAD placed 12/5/18 ASA 81mg Daily Has Jantoven 2mg tablets   Please speak with sister Fani Ph [phone]  [facility] is [facility] to go [facility].      Anticoagulation Care Providers     Provider Role Specialty Phone number    Lorena Watkins MD [facility] Cardiology [phone]            See the Encounter Report to view  Anticoagulation Flowsheet and Dosing Calendar (Go to Encounters tab in chart review, and find the Anticoagulation Therapy Visit)    Spoke with sister Fani. Gave them INR results and new warfarin recommendation.  No changes in health, medication, or diet. No missed doses, no falls. No signs or symptoms of bleed or clotting.     Kristie Fernandez RN

## 2019-01-01 ENCOUNTER — CARE COORDINATION (OUTPATIENT)
Dept: CARDIOLOGY | Facility: CLINIC | Age: 64
End: 2019-01-01

## 2019-01-01 ENCOUNTER — ANTICOAGULATION THERAPY VISIT (OUTPATIENT)
Dept: ANTICOAGULATION | Facility: CLINIC | Age: 64
End: 2019-01-01

## 2019-01-01 DIAGNOSIS — Z79.01 LONG TERM (CURRENT) USE OF ANTICOAGULANTS: ICD-10-CM

## 2019-01-01 DIAGNOSIS — Z95.811 LVAD (LEFT VENTRICULAR ASSIST DEVICE) PRESENT (H): ICD-10-CM

## 2019-01-01 LAB
INR PPP: 3.2 (ref 0.9–1.1)
INR PPP: 3.5 (ref 0.9–1.1)
INR PPP: 3.8 (ref 0.9–1.1)
MDC_IDC_EPISODE_DTM: NORMAL
MDC_IDC_EPISODE_DURATION: 12 S
MDC_IDC_EPISODE_DURATION: 12 S
MDC_IDC_EPISODE_DURATION: 13 S
MDC_IDC_EPISODE_DURATION: 15 S
MDC_IDC_EPISODE_DURATION: 17 S
MDC_IDC_EPISODE_DURATION: 27 S
MDC_IDC_EPISODE_ID: NORMAL
MDC_IDC_EPISODE_TYPE: NORMAL
MDC_IDC_EPISODE_VENDOR_TYPE: NORMAL
MDC_IDC_EPISODE_VENDOR_TYPE: NORMAL
MDC_IDC_LEAD_IMPLANT_DT: NORMAL
MDC_IDC_LEAD_LOCATION: NORMAL
MDC_IDC_LEAD_LOCATION_DETAIL_1: NORMAL
MDC_IDC_LEAD_MFG: NORMAL
MDC_IDC_LEAD_MODEL: NORMAL
MDC_IDC_LEAD_POLARITY_TYPE: NORMAL
MDC_IDC_LEAD_SERIAL: NORMAL
MDC_IDC_MSMT_BATTERY_DTM: NORMAL
MDC_IDC_MSMT_BATTERY_REMAINING_LONGEVITY: 144 MO
MDC_IDC_MSMT_BATTERY_REMAINING_PERCENTAGE: 100 %
MDC_IDC_MSMT_BATTERY_STATUS: NORMAL
MDC_IDC_MSMT_CAP_CHARGE_DTM: NORMAL
MDC_IDC_MSMT_CAP_CHARGE_TIME: 10.5 S
MDC_IDC_MSMT_CAP_CHARGE_TYPE: NORMAL
MDC_IDC_MSMT_LEADCHNL_RV_IMPEDANCE_VALUE: 399 OHM
MDC_IDC_MSMT_LEADCHNL_RV_PACING_THRESHOLD_AMPLITUDE: 2.6 V
MDC_IDC_MSMT_LEADCHNL_RV_PACING_THRESHOLD_PULSEWIDTH: 0.5 MS
MDC_IDC_PG_IMPLANT_DTM: NORMAL
MDC_IDC_PG_MFG: NORMAL
MDC_IDC_PG_MODEL: NORMAL
MDC_IDC_PG_SERIAL: NORMAL
MDC_IDC_PG_TYPE: NORMAL
MDC_IDC_SESS_CLINIC_NAME: NORMAL
MDC_IDC_SESS_DTM: NORMAL
MDC_IDC_SESS_TYPE: NORMAL
MDC_IDC_SET_BRADY_LOWRATE: 40 {BEATS}/MIN
MDC_IDC_SET_BRADY_MODE: NORMAL
MDC_IDC_SET_LEADCHNL_RV_PACING_AMPLITUDE: 3 V
MDC_IDC_SET_LEADCHNL_RV_PACING_POLARITY: NORMAL
MDC_IDC_SET_LEADCHNL_RV_PACING_PULSEWIDTH: 0.5 MS
MDC_IDC_SET_LEADCHNL_RV_SENSING_ADAPTATION_MODE: NORMAL
MDC_IDC_SET_LEADCHNL_RV_SENSING_POLARITY: NORMAL
MDC_IDC_SET_LEADCHNL_RV_SENSING_SENSITIVITY: 0.6 MV
MDC_IDC_SET_ZONE_DETECTION_INTERVAL: 250 MS
MDC_IDC_SET_ZONE_DETECTION_INTERVAL: 300 MS
MDC_IDC_SET_ZONE_DETECTION_INTERVAL: 375 MS
MDC_IDC_SET_ZONE_TYPE: NORMAL
MDC_IDC_SET_ZONE_VENDOR_TYPE: NORMAL
MDC_IDC_STAT_BRADY_DTM_END: NORMAL
MDC_IDC_STAT_BRADY_DTM_START: NORMAL
MDC_IDC_STAT_BRADY_RV_PERCENT_PACED: 0 %
MDC_IDC_STAT_EPISODE_RECENT_COUNT: 0
MDC_IDC_STAT_EPISODE_RECENT_COUNT: 4
MDC_IDC_STAT_EPISODE_RECENT_COUNT_DTM_END: NORMAL
MDC_IDC_STAT_EPISODE_RECENT_COUNT_DTM_START: NORMAL
MDC_IDC_STAT_EPISODE_TYPE: NORMAL
MDC_IDC_STAT_EPISODE_VENDOR_TYPE: NORMAL
MDC_IDC_STAT_TACHYTHERAPY_ATP_DELIVERED_RECENT: 0
MDC_IDC_STAT_TACHYTHERAPY_ATP_DELIVERED_TOTAL: 0
MDC_IDC_STAT_TACHYTHERAPY_RECENT_DTM_END: NORMAL
MDC_IDC_STAT_TACHYTHERAPY_RECENT_DTM_START: NORMAL
MDC_IDC_STAT_TACHYTHERAPY_SHOCKS_ABORTED_RECENT: 0
MDC_IDC_STAT_TACHYTHERAPY_SHOCKS_ABORTED_TOTAL: 0
MDC_IDC_STAT_TACHYTHERAPY_SHOCKS_DELIVERED_RECENT: 0
MDC_IDC_STAT_TACHYTHERAPY_SHOCKS_DELIVERED_TOTAL: 0
MDC_IDC_STAT_TACHYTHERAPY_TOTAL_DTM_END: NORMAL
MDC_IDC_STAT_TACHYTHERAPY_TOTAL_DTM_START: NORMAL

## 2019-01-01 RX ORDER — LISINOPRIL 5 MG/1
TABLET ORAL
Qty: 60 TABLET | Refills: 1 | Status: SHIPPED | OUTPATIENT
Start: 2019-01-01 | End: 2019-01-01

## 2019-01-02 ENCOUNTER — CARE COORDINATION (OUTPATIENT)
Dept: CARDIOLOGY | Facility: CLINIC | Age: 64
End: 2019-01-02

## 2019-01-02 ENCOUNTER — ANTICOAGULATION THERAPY VISIT (OUTPATIENT)
Dept: ANTICOAGULATION | Facility: CLINIC | Age: 64
End: 2019-01-02

## 2019-01-02 DIAGNOSIS — Z95.811 LVAD (LEFT VENTRICULAR ASSIST DEVICE) PRESENT (H): ICD-10-CM

## 2019-01-02 LAB — INR PPP: 3.03 (ref 0.86–1.14)

## 2019-01-02 RX ORDER — BUMETANIDE 1 MG/1
1 TABLET ORAL 2 TIMES DAILY
Qty: 60 TABLET | Refills: 0 | Status: SHIPPED | OUTPATIENT
Start: 2019-01-02 | End: 2019-01-10

## 2019-01-02 RX ORDER — POTASSIUM CHLORIDE 1500 MG/1
20 TABLET, EXTENDED RELEASE ORAL 2 TIMES DAILY
Qty: 90 TABLET | Refills: 0 | Status: SHIPPED | OUTPATIENT
Start: 2019-01-02 | End: 2019-01-10

## 2019-01-02 NOTE — PROGRESS NOTES
ANTICOAGULATION FOLLOW-UP CLINIC VISIT    Patient Name:  Danielito Chu  Date:  1/2/2019  Contact Type:  Telephone    SUBJECTIVE:        OBJECTIVE    INR   Date Value Ref Range Status   01/02/2019 3.03 (H) 0.86 - 1.14 Final     Factor 2 Assay   Date Value Ref Range Status   10/27/2018 70 60 - 140 % Final     Comment:     The Factor 2 activity level is not a screening test for the Prothrombin 59202   mutation.         ASSESSMENT / PLAN  No question data found.  Anticoagulation Summary  As of 1/2/2019    INR goal:   2.0-3.0   TTR:   24.5 % (1.6 wk)   INR used for dosing:   3.03! (1/2/2019)   Warfarin maintenance plan:   No maintenance plan   Full warfarin instructions:   1/2: 3 mg; 1/3: 2 mg; 1/4: 3 mg; 1/5: 2 mg; 1/6: 3 mg   Plan last modified:   Kristie Fernandez RN (12/20/2018)   Next INR check:   1/7/2019   Priority:   INR   Target end date:   Indefinite    Indications    LVAD (left ventricular assist device) present (H) [Z95.811]             Anticoagulation Episode Summary     INR check location:       Preferred lab:       Send INR reminders to:   [facility]    Comments:   LVAD placed 12/5/18 ASA 81mg Daily Has Jantoven 2mg tablets   Please speak with sister Fani  [phone]  Pt will be staying at the [facility] until he is stable to go home.      Anticoagulation Care Providers     Provider Role Specialty Phone number    JuneLorena aguilar MD [facility] Cardiology [phone]            See the Encounter Report to view Anticoagulation Flowsheet and Dosing Calendar (Go to Encounters tab in chart review, and find the Anticoagulation Therapy Visit)    Spoke with Fani.     Bri Becerril RN

## 2019-01-02 NOTE — PROGRESS NOTES
Patient has f/u appt with Minoo Lopez NP next week for LVAD f/u.  Per Asim, LVAD coordinator, patient has not been seen recently by Dr. Watkins since implant and feels patient should be seen early on in his LVAD f/u.  I was unable to get a hold of patient last week, but talked to him today. He is able to come nexxt Thursday for labs and appt with Dr. Watkins.  I will have  cancel the appt on Wed.

## 2019-01-02 NOTE — PROGRESS NOTES
I checked in with Fabiano today. He feels good with no shortness of breath or fatigue. He is worried that his weight has gone up 8 or 9 pounds after his bumex and KCl were cut in half last week. I checked with Minoo the NP and we have increased the Bumex to 1mg BID and the KCl to 20mEq Bid. Fabiano will have his labs rechecked in clinic on 1/9.

## 2019-01-03 ENCOUNTER — CARE COORDINATION (OUTPATIENT)
Dept: CARDIOLOGY | Facility: CLINIC | Age: 64
End: 2019-01-03

## 2019-01-03 NOTE — PROGRESS NOTES
Today I met with Fabiano, his sister Fani, his brother Matti, and his friend Angel to do some VAD teaching for Angel and Matti.    Matti did the dsng change on the practice board.  Angel did the dsng change on Fabiano. He is now checked off on the dsng.    Topics covered with Matti and Angel included: General VAD overview, equipment overview, power, controller, Alarms, driveline dsng, and anticoagulation.    Plan: meet with them tomorrow to get Matti checked off on the dsng change, both of them checked off on the controller change. They will then be given the test to finish over the weekend. They will mail their completed tests back to me. I will call them with corrections.

## 2019-01-04 ENCOUNTER — ANTICOAGULATION THERAPY VISIT (OUTPATIENT)
Dept: ANTICOAGULATION | Facility: CLINIC | Age: 64
End: 2019-01-04

## 2019-01-04 ENCOUNTER — CARE COORDINATION (OUTPATIENT)
Dept: CARDIOLOGY | Facility: CLINIC | Age: 64
End: 2019-01-04

## 2019-01-04 DIAGNOSIS — Z95.811 LVAD (LEFT VENTRICULAR ASSIST DEVICE) PRESENT (H): ICD-10-CM

## 2019-01-04 LAB — INR PPP: 2.16 (ref 0.86–1.14)

## 2019-01-04 NOTE — PROGRESS NOTES
Patient had LVAD placed on:   2018  Patient's current Aspirin dose: 81mg  LVAD Protocol followed: yes    ANTICOAGULATION FOLLOW-UP CLINIC VISIT    Patient Name:  Danielito Chu  Date:  2019  Contact Type:  Telephone    SUBJECTIVE:     Patient Findings     Positives:   No Problem Findings           OBJECTIVE    INR   Date Value Ref Range Status   2019 2.16 (H) 0.86 - 1.14 Final     Factor 2 Assay   Date Value Ref Range Status   10/27/2018 70 60 - 140 % Final     Comment:     The Factor 2 activity level is not a screening test for the Prothrombin 46827   mutation.         ASSESSMENT / PLAN  INR assessment THER    Recheck INR In: 6 DAYS    INR Location Clinic      Anticoagulation Summary  As of 2019    INR goal:   2.0-3.0   TTR:   35.0 % (1.9 wk)   INR used for dosin.16 (2019)   Warfarin maintenance plan:   No maintenance plan   Full warfarin instructions:   : 3 mg; : 3 mg; : 3 mg; : 3 mg; : 2 mg; : 3 mg   Plan last modified:   Kristie Fernandez RN (2018)   Next INR check:   1/10/2019   Priority:   INR   Target end date:   Indefinite    Indications    LVAD (left ventricular assist device) present (H) [Z95.811]             Anticoagulation Episode Summary     INR check location:       Preferred lab:       Send INR reminders to:   [facility]    Comments:   LVAD placed 18 ASA 81mg Daily Has Jantoven 2mg tablets   Please speak with sister Fani  [phone]  Pt will be staying at the [facility] until he is stable to go home.      Anticoagulation Care Providers     Provider Role Specialty Phone number    Lorena Watkins MD Responsible Cardiology [phone]            See the Encounter Report to view Anticoagulation Flowsheet and Dosing Calendar (Go to Encounters tab in chart review, and find the Anticoagulation Therapy Visit)    Spoke with Patient and Fani.    Lorena Kamara RN

## 2019-01-04 NOTE — PROGRESS NOTES
Finished teaching today with brother Matti ([phone]) and friend Angel ([phone]). They are both checked off on the controller change and on the dsng change. They were both given the test and instructed to mail it back to me. I will grade their tests and call them to review their tests. Fabiano's 30 day period is up on 1/18/2019. He is eager to return to his home.

## 2019-01-05 ENCOUNTER — CARE COORDINATION (OUTPATIENT)
Dept: CARDIOLOGY | Facility: CLINIC | Age: 64
End: 2019-01-05

## 2019-01-06 NOTE — PROGRESS NOTES
D:  Received call from patient regarding episode of dizziness that he had while talking on the phone this am.  Pt states he looked at his controller and saw his PI go up to 10 and then decrease back to 2.5.  Pt denies dizziness now.  Flows 4s.  PIs reading upper 2s.  Baseline PIs normally 3s.    I:  Instructed pt to push fluids today.  Call VAD Coordinator on call if he continues to have dizziness or lightheadedness.  A;  Pt verbalized understanding.  P:  Pt to RTC 1/10.

## 2019-01-07 ENCOUNTER — CARE COORDINATION (OUTPATIENT)
Dept: CARDIOLOGY | Facility: CLINIC | Age: 64
End: 2019-01-07

## 2019-01-07 DIAGNOSIS — Z95.810 CARDIAC DEFIBRILLATOR IN SITU: Primary | ICD-10-CM

## 2019-01-07 NOTE — PROGRESS NOTES
Called patient/caregiver on 1/4/19 to check in 3 weeks post discharge. Pt reports VAD parameters stable and weight stable. Reviewed medications and answered any questions. Patient reports sleeping fine and no anxiety since being home with LVAD. Patient is fully able to move around the house and care for him/herself independently.     Discussed specific new problems/stressors since being discharged from the hospital: Fabiano states he is eager to get home. Empathized with patient and reviewed coping strategies: enlisting support from friends and love ones, attending patient and caregiver support groups, reviewing LVAD educational materials to reinforce knowledge, and talking about concerns with family/care providers/trusted others. Encouraged pt to page VAD Coordinator with any issues or questions. Pt verbalizes understanding.

## 2019-01-08 ENCOUNTER — CARE COORDINATION (OUTPATIENT)
Dept: CARDIOLOGY | Facility: CLINIC | Age: 64
End: 2019-01-08

## 2019-01-08 DIAGNOSIS — Z95.811 LVAD (LEFT VENTRICULAR ASSIST DEVICE) PRESENT (H): Primary | ICD-10-CM

## 2019-01-10 ENCOUNTER — CARE COORDINATION (OUTPATIENT)
Dept: CARDIOLOGY | Facility: CLINIC | Age: 64
End: 2019-01-10

## 2019-01-10 ENCOUNTER — OFFICE VISIT (OUTPATIENT)
Dept: CARDIOLOGY | Facility: CLINIC | Age: 64
End: 2019-01-10
Attending: INTERNAL MEDICINE
Payer: COMMERCIAL

## 2019-01-10 ENCOUNTER — ANTICOAGULATION THERAPY VISIT (OUTPATIENT)
Dept: ANTICOAGULATION | Facility: CLINIC | Age: 64
End: 2019-01-10

## 2019-01-10 VITALS
HEIGHT: 69 IN | OXYGEN SATURATION: 98 % | BODY MASS INDEX: 27.55 KG/M2 | SYSTOLIC BLOOD PRESSURE: 80 MMHG | WEIGHT: 186 LBS

## 2019-01-10 DIAGNOSIS — Z95.811 LVAD (LEFT VENTRICULAR ASSIST DEVICE) PRESENT (H): ICD-10-CM

## 2019-01-10 LAB
ALBUMIN SERPL-MCNC: 3.3 G/DL (ref 3.4–5)
ALP SERPL-CCNC: 144 U/L (ref 40–150)
ALT SERPL W P-5'-P-CCNC: 12 U/L (ref 0–70)
ANION GAP SERPL CALCULATED.3IONS-SCNC: 5 MMOL/L (ref 3–14)
AST SERPL W P-5'-P-CCNC: 18 U/L (ref 0–45)
BILIRUB SERPL-MCNC: 0.7 MG/DL (ref 0.2–1.3)
BUN SERPL-MCNC: 11 MG/DL (ref 7–30)
CALCIUM SERPL-MCNC: 8.9 MG/DL (ref 8.5–10.1)
CHLORIDE SERPL-SCNC: 101 MMOL/L (ref 94–109)
CO2 SERPL-SCNC: 30 MMOL/L (ref 20–32)
CREAT SERPL-MCNC: 0.66 MG/DL (ref 0.66–1.25)
ERYTHROCYTE [DISTWIDTH] IN BLOOD BY AUTOMATED COUNT: 17.3 % (ref 10–15)
GFR SERPL CREATININE-BSD FRML MDRD: >90 ML/MIN/{1.73_M2}
GLUCOSE SERPL-MCNC: 120 MG/DL (ref 70–99)
HCT VFR BLD AUTO: 39.6 % (ref 40–53)
HGB BLD-MCNC: 11.8 G/DL (ref 13.3–17.7)
INR PPP: 2.79 (ref 0.86–1.14)
LDH SERPL L TO P-CCNC: 217 U/L (ref 85–227)
MCH RBC QN AUTO: 27.6 PG (ref 26.5–33)
MCHC RBC AUTO-ENTMCNC: 29.8 G/DL (ref 31.5–36.5)
MCV RBC AUTO: 93 FL (ref 78–100)
PLATELET # BLD AUTO: 459 10E9/L (ref 150–450)
POTASSIUM SERPL-SCNC: 4.1 MMOL/L (ref 3.4–5.3)
PROT SERPL-MCNC: 8.2 G/DL (ref 6.8–8.8)
RBC # BLD AUTO: 4.27 10E12/L (ref 4.4–5.9)
SODIUM SERPL-SCNC: 136 MMOL/L (ref 133–144)
WBC # BLD AUTO: 7.4 10E9/L (ref 4–11)

## 2019-01-10 PROCEDURE — 36415 COLL VENOUS BLD VENIPUNCTURE: CPT | Performed by: INTERNAL MEDICINE

## 2019-01-10 PROCEDURE — 99215 OFFICE O/P EST HI 40 MIN: CPT | Mod: 25 | Performed by: INTERNAL MEDICINE

## 2019-01-10 PROCEDURE — 93750 INTERROGATION VAD IN PERSON: CPT | Mod: ZF

## 2019-01-10 PROCEDURE — 93750 INTERROGATION VAD IN PERSON: CPT | Performed by: INTERNAL MEDICINE

## 2019-01-10 PROCEDURE — 85610 PROTHROMBIN TIME: CPT | Performed by: INTERNAL MEDICINE

## 2019-01-10 PROCEDURE — 85027 COMPLETE CBC AUTOMATED: CPT | Performed by: INTERNAL MEDICINE

## 2019-01-10 PROCEDURE — 80053 COMPREHEN METABOLIC PANEL: CPT | Performed by: INTERNAL MEDICINE

## 2019-01-10 PROCEDURE — 83615 LACTATE (LD) (LDH) ENZYME: CPT | Performed by: INTERNAL MEDICINE

## 2019-01-10 PROCEDURE — G0463 HOSPITAL OUTPT CLINIC VISIT: HCPCS | Mod: 25,ZF

## 2019-01-10 RX ORDER — BUMETANIDE 1 MG/1
1 TABLET ORAL DAILY
Qty: 60 TABLET | Refills: 0 | Status: SHIPPED | OUTPATIENT
Start: 2019-01-10 | End: 2019-01-15

## 2019-01-10 RX ORDER — SPIRONOLACTONE 25 MG/1
12.5 TABLET ORAL DAILY
Qty: 180 TABLET | Refills: 0 | Status: SHIPPED | OUTPATIENT
Start: 2019-01-10 | End: 2019-01-15

## 2019-01-10 ASSESSMENT — MIFFLIN-ST. JEOR: SCORE: 1634.07

## 2019-01-10 ASSESSMENT — PAIN SCALES - GENERAL: PAINLEVEL: NO PAIN (0)

## 2019-01-10 NOTE — PATIENT INSTRUCTIONS
Medications:  1. Please start taking Spironolactone 12.5mg daily.  2. Please stop taking Potassium Choride  3. Please change your Bumex dose to 1mg daily    Follow-up:  1. On 1/15, please get lab work, device check, and go to your cardiology and surgery appointments and your ECHO.    Instructions:  1. Please keep taking your coumadin as the anticoagulation clinic RN advised.    Page the VAD Coordinator on call if you gain more than 3 lb in a day or 5 in a week. Please also page if you feel unwell or have alarms.     Great to see you in clinic today. To Page the VAD Coordinator on call, dial [phone] option #4 and ask to speak to the VAD coordinator on call.

## 2019-01-10 NOTE — LETTER
1/10/2019      RE: Danielito Chu  [id number] [place] [address]       Dear Colleague,    Thank you for the opportunity to participate in the care of your patient, Danielito Chu, at the [facility] Please see a copy of my visit note below.    January 10, 2019    HPI:   Mr. Danielito Chu is a 63yr old male with a history of NICM (LVEF at dong of 15%) s/p ICD (2017),  who underwent HM3 LVAD implant on 18 as DT (some history of mariajuana smoking, liver disease).     Hospital course was notable for postoperative flolan and pressor support but was ultimately weaned from this and has been doing better and better every week.   He has been walking longer and longer distances and has more energy than he has had in years.     He can walk several blocks without stopping.  No orthopnea, PND, chest pain, palpitations, swelling. Appetite is great.     No driveline erythema No fevers or chills. No bleeding. No focal deficits.      PAST MEDICAL HISTORY:  Past Medical History:   Diagnosis Date     Acute systolic congestive heart failure (H) 2017     Diabetes (H)      HTN (hypertension)      Hyperlipidemia      Mitral regurgitation      Nocturnal oxygen desaturation 3/29/2018     Nonischemic cardiomyopathy (H) 2017     SHIRLEY (obstructive sleep apnea)      Pacemaker 2017    ICD 17       FAMILY HISTORY:  Family History   Problem Relation Age of Onset     Heart Failure Father          at age 86     Heart Failure Paternal Uncle          at 66      Myocardial Infarction Paternal Uncle          at age 62     Coronary Artery Disease Mother          during CABG at age 41       SOCIAL HISTORY:  Social History     Social History     Marital status: Single     Spouse name: N/A     Number of children: N/A     Years of education: N/A     Social History Main Topics     Smoking status: Former Smoker     Smokeless tobacco: Never Used       Comment: quit 3/2017     Alcohol use None      Comment: social     Drug use: None     Sexual activity: Not Asked     Other Topics Concern     None     Social History Narrative       CURRENT MEDICATIONS:   Current Outpatient Medications on File Prior to Visit:  acetaminophen (TYLENOL) 325 MG tablet Take 2 tablets (650 mg) by mouth every 6 hours as needed for pain   allopurinol (ZYLOPRIM) 100 MG tablet Take 2 tablets (200 mg) by mouth daily   artificial saliva (BIOTENE DRY MOUTHWASH) LIQD liquid Swish and spit 10 mLs in mouth 4 times daily as needed for dry mouth   aspirin (ASA) 81 MG chewable tablet Take 1 tablet (81 mg) by mouth daily   bumetanide (BUMEX) 1 MG tablet Take 1 tablet (1 mg) by mouth 2 times daily   colchicine (COLCYRS) 0.6 MG tablet Take 1 tablet (0.6 mg) by mouth 2 times daily   hydrocortisone (CORTAID) 1 % external cream Apply topically 2 times daily as needed for itching   losartan (COZAAR) 25 MG tablet Take 1 tablet (25 mg) by mouth daily   melatonin 3 MG tablet Take 1 tablet (3 mg) by mouth At Bedtime   metoprolol succinate ER (TOPROL XL) 25 MG 24 hr tablet Take 0.5 tablets (12.5 mg) by mouth daily   omeprazole (PRILOSEC) 20 MG DR capsule Take 1 capsule (20 mg) by mouth every morning (before breakfast)   potassium chloride ER (K-DUR/KLOR-CON M) 20 MEQ CR tablet Take 1 tablet (20 mEq) by mouth 2 times daily   senna-docusate (SENOKOT-S/PERICOLACE) 8.6-50 MG tablet Take 2 tablets by mouth daily as needed for constipation   traZODone (DESYREL) 100 MG tablet Take 1 tablet (100 mg) by mouth nightly as needed for sleep   vitamin B1 (THIAMINE) 100 MG tablet Take 1 tablet (100 mg) by mouth daily   warfarin (COUMADIN) 2 MG tablet Take 3 mg PO daily at 6 pm until next INR check, then dose per INR goal 2-3   Blood Glucose Monitoring Suppl (BLOOD GLUCOSE MONITOR SYSTEM) w/Device KIT three times a week   predniSONE (DELTASONE) 20 MG tablet Take 40 mg by mouth once as needed. (Patient not taking: Reported  "on 1/10/2019.)     No current facility-administered medications on file prior to visit.     ROS:   Constitutional: No fever, chills, or sweats. Stable starting to increase with more eating  ENT: No visual disturbance, ear ache, epistaxis, sore throat.   Allergies/Immunologic: Negative.   Respiratory: No cough, hemoptysis.   Cardiovascular: As per HPI.   GI: No nausea, vomiting, hematemesis, melena, or hematochezia.   : No urinary frequency, dysuria, or hematuria.   Integument: Negative.   Psychiatric: As per HPI.  Neuro: Negative.   Endocrinology: Negative.   Musculoskeletal:gout is under control     EXAM:  BP (!) 80/0 (BP Location: Right arm, Patient Position: Chair, Cuff Size: Adult Regular)   Ht 1.753 m (5' 9\")   Wt 84.4 kg (186 lb)   SpO2 98%   BMI 27.47 kg/m     General: appears comfortable, alert and articulate  Head: normocephalic, atraumatic  Eyes: anicteric sclera, EOMI  Neck: no adenopathy  Orophyarynx: moist mucosa, no lesions, dentition intact  Heart: regular, S1/S2, grade 2/6 JENNY best heard at LLSB, no gallop or rub, JVP is flat  Lungs: clear, no rales or wheezing  Abdomen: soft, non-tender, bowel sounds present, no hepatomegaly  Extremities: no clubbing, cyanosis or LE edema  Neurological: normal speech and affect, no gross motor deficits    VAD Interrogation: VAD interrogation reviewed with VAD coordinator. Agree with findings. No PI events, power spikes, speed drops, or other findings suspicious of pump malfunction noted.     Labs:  Reviewed INR, BMP, albumin     Diagnostics:  Last echo reviewed     Assessment and Plan:   Mr. Danielito Chu is a 63yr old male with a history of NICM (LVEF at dong of 15%) s/p ICD (4/2017),  who underwent HM3 LVAD implant on 12/5/18 as DT ([facility], liver disease). He is overall recovering extremely well. He is going to start cardiac rehab when he returns to  [place] next week.     Chronic systolic heart failure secondary to NICM  Stage " D  NYHA Class II  ACEi/ARB:  Yes, losartan   BB: yes metoprolol XL 12.5 mg QD  Aldosterone antagonist:  Adding this back today    SCD prophylaxis:  ICD  Fluid status:  euvolemic - changing bumex to 1 mg daily with addition of aldactone - stopping k replacement   MAP: at goal of 65-80   Anti-coag goal: ASA 81, INR 2-3     Other:     Sleep Apnea Evaluation:  Recommend re-testing when he is fully recovered at month 3     NSVT controlled, has ICD, now on beta blocker     Transplant candidacy: I told him we would re-evaluate at 3 months. He is going to not smoke any recreational mariajuana and we will discuss his liver again in a few months.     Malnutrition: improving on exam and albumin is rising     35 minutes spent in direct care, >50% in counseling    Lorena Watkins MD     RTC as previously scheduled       CC  Patient Care Team:  Bryan Orellana MD as PCP - General  June, Lorena Lindsey MD as MD (Cardiology)  Sparkle Joel, RN as Nurse Coordinator (Cardiology)  Cecelia Palacios, RN as Nurse Coordinator (Cardiology)  Deysi Mattson APRN CNP as Nurse Practitioner (Cardiology)  Kalli Purcell APRN CNP as Nurse Practitioner (Cardiology)  Giselle Johnson, RN as Nurse Coordinator (Cardiology)  Candido Mendez MD as MD (Gastroenterology)  SELF, REFERRED

## 2019-01-10 NOTE — NURSING NOTE
1). PUMP DATA  Primary controller serial number: [id number]    HM 3:   Flow: 5 L/min,    Speed: 5500 RPMs,     PI: 3.2 ,  Power: 4.5 [place], Hct: 40 (increased from 31)     Primary controller   Back up battery: Patient use: 11, Replace in: 29  Months     Data downloaded: No   Equipment and driveline assessed for damage: Yes     Back up : Serial number: [id number]  Back up battery: Patient use: 11 Replace in: 28  Months  Programmed settings identical to the settings on the primary controller :Yes      Education complete: Yes   Charge the BACKUP controller s backup battery every 6 months  Perform a self test on BACKUP every 6 months  Change the MPU s batteries every 6 months:Yes    There were no alarms. There were some PI events with the PI as high as 10 and as low as 2.2. This information was reviewed with Elyse.    3). DRESSING CHANGE / DRIVELINE ASSESSMENT  Dressing change completed today: Yes  Appearance of Driveline site: no drainage. Suture is still in place.    Driveline stabilization: Method: [employer]  [ Teaching reinforced on need for stabilization of Driveline. ]    Shower bag was demonstrated to patient and Fani. They will hold off on using it until after the suture is out. Dr. Watkins gave you the OK to drive your car.  Cardiac Rehab will be ordered to start in [place] when the patient goes home after ~1/18.    4).Pt. Education    D:  Pt education provided.  I:  Pt educated on the following topics:  1. When to call 911.  Reviewed emergency situations.  2. What to do if my VAD Coordinator is not returning my call.  3. When to page the VAD Coordinator on Call (handout provided w/ frequent symptoms to report).  4. Pt practice paged the VAD Coordinator on Call.   5. Pt given page of stickers with the number for the VAD Coordinator on Call.  6. Pt given list of frequently used resources and their numbers.  Reviewed when to call each resource.  (Social Work, Financial Counselor, Coumadin  Clinic, Device Nurse, ).  A:  Pt verbalized understanding.  P:  VAD Coordinator available for questions or concerns.  Will continue VAD education.

## 2019-01-10 NOTE — PROGRESS NOTES
ANTICOAGULATION FOLLOW-UP CLINIC VISIT    Patient Name:  Danielito Chu  Date:  1/10/2019  Contact Type:  Telephone    SUBJECTIVE:     Patient Findings     Positives:   No Problem Findings    Comments:   Spoke to Armando.  Recommended continuation of pattern of 2mg on Tue, Thurs, 3mg all other days of the week.           OBJECTIVE    INR   Date Value Ref Range Status   01/10/2019 2.79 (H) 0.86 - 1.14 Final     Factor 2 Assay   Date Value Ref Range Status   10/27/2018 70 60 - 140 % Final     Comment:     The Factor 2 activity level is not a screening test for the Prothrombin 35890   mutation.         ASSESSMENT / PLAN  INR assessment THER    Recheck INR In: 5 DAYS    INR Location Clinic      Anticoagulation Summary  As of 1/10/2019    INR goal:   2.0-3.0   TTR:   55.1 % (2.7 wk)   INR used for dosin.79 (1/10/2019)   Warfarin maintenance plan:   No maintenance plan   Full warfarin instructions:   1/10: 2 mg; : 3 mg; : 3 mg; : 3 mg; : 3 mg   Plan last modified:   Kristie Fernandez, RN (2018)   Next INR check:   1/15/2019   Priority:   INR   Target end date:   Indefinite    Indications    LVAD (left ventricular assist device) present (H) [Z95.811]             Anticoagulation Episode Summary     INR check location:       Preferred lab:       Send INR reminders to:   [facility]    Comments:   LVAD placed 18 ASA 81mg Daily Has Jantoven 2mg tablets   Please speak with sister Fani  [phone]  Pt will be staying at the [facility] until he is stable to go home.      Anticoagulation Care Providers     Provider Role Specialty Phone number    Lorena Watkins MD Responsible Cardiology [phone]            See the Encounter Report to view Anticoagulation Flowsheet and Dosing Calendar (Go to Encounters tab in chart review, and find the Anticoagulation Therapy Visit)    Spoke with patient and sister. Repeated recommendation twice.  Had patient verbalize dosing and  understanding.    Patient had LVAD placed on:   12/5/18  Patient's current Aspirin dose: 81mg  LVAD Protocol followed:  Yes.   If Not Followed Explanation:  Fady Etienne, RN

## 2019-01-10 NOTE — PROGRESS NOTES
January 10, 2019    HPI:   Mr. Danielito Chu is a 63yr old male with a history of NICM (LVEF at dong of 15%) s/p ICD (2017),  who underwent HM3 LVAD implant on 18 as DT (some history of mariajuana smoking, liver disease).     Hospital course was notable for postoperative flolan and pressor support but was ultimately weaned from this and has been doing better and better every week.   He has been walking longer and longer distances and has more energy than he has had in years.     He can walk several blocks without stopping.  No orthopnea, PND, chest pain, palpitations, swelling. Appetite is great.     No driveline erythema No fevers or chills. No bleeding. No focal deficits.      PAST MEDICAL HISTORY:  Past Medical History:   Diagnosis Date     Acute systolic congestive heart failure (H) 2017     Diabetes (H)      HTN (hypertension)      Hyperlipidemia      Mitral regurgitation      Nocturnal oxygen desaturation 3/29/2018     Nonischemic cardiomyopathy (H) 2017     SHIRLEY (obstructive sleep apnea)      Pacemaker 2017    ICD 17       FAMILY HISTORY:  Family History   Problem Relation Age of Onset     Heart Failure Father          at age 86     Heart Failure Paternal Uncle          at 66      Myocardial Infarction Paternal Uncle          at age 62     Coronary Artery Disease Mother          during CABG at age 41       SOCIAL HISTORY:  Social History     Social History     Marital status: Single     Spouse name: N/A     Number of children: N/A     Years of education: N/A     Social History Main Topics     Smoking status: Former Smoker     Smokeless tobacco: Never Used      Comment: quit 3/2017     Alcohol use None      Comment: social     Drug use: None     Sexual activity: Not Asked     Other Topics Concern     None     Social History Narrative       CURRENT MEDICATIONS:   Current Outpatient Medications on File Prior to Visit:  acetaminophen (TYLENOL) 325 MG tablet Take 2  tablets (650 mg) by mouth every 6 hours as needed for pain   allopurinol (ZYLOPRIM) 100 MG tablet Take 2 tablets (200 mg) by mouth daily   artificial saliva (BIOTENE DRY MOUTHWASH) LIQD liquid Swish and spit 10 mLs in mouth 4 times daily as needed for dry mouth   aspirin (ASA) 81 MG chewable tablet Take 1 tablet (81 mg) by mouth daily   bumetanide (BUMEX) 1 MG tablet Take 1 tablet (1 mg) by mouth 2 times daily   colchicine (COLCYRS) 0.6 MG tablet Take 1 tablet (0.6 mg) by mouth 2 times daily   hydrocortisone (CORTAID) 1 % external cream Apply topically 2 times daily as needed for itching   losartan (COZAAR) 25 MG tablet Take 1 tablet (25 mg) by mouth daily   melatonin 3 MG tablet Take 1 tablet (3 mg) by mouth At Bedtime   metoprolol succinate ER (TOPROL XL) 25 MG 24 hr tablet Take 0.5 tablets (12.5 mg) by mouth daily   omeprazole (PRILOSEC) 20 MG DR capsule Take 1 capsule (20 mg) by mouth every morning (before breakfast)   potassium chloride ER (K-DUR/KLOR-CON M) 20 MEQ CR tablet Take 1 tablet (20 mEq) by mouth 2 times daily   senna-docusate (SENOKOT-S/PERICOLACE) 8.6-50 MG tablet Take 2 tablets by mouth daily as needed for constipation   traZODone (DESYREL) 100 MG tablet Take 1 tablet (100 mg) by mouth nightly as needed for sleep   vitamin B1 (THIAMINE) 100 MG tablet Take 1 tablet (100 mg) by mouth daily   warfarin (COUMADIN) 2 MG tablet Take 3 mg PO daily at 6 pm until next INR check, then dose per INR goal 2-3   Blood Glucose Monitoring Suppl (BLOOD GLUCOSE MONITOR SYSTEM) w/Device KIT three times a week   predniSONE (DELTASONE) 20 MG tablet Take 40 mg by mouth once as needed. (Patient not taking: Reported on 1/10/2019.)     No current facility-administered medications on file prior to visit.     ROS:   Constitutional: No fever, chills, or sweats. Stable starting to increase with more eating  ENT: No visual disturbance, ear ache, epistaxis, sore throat.   Allergies/Immunologic: Negative.   Respiratory: No cough,  "hemoptysis.   Cardiovascular: As per HPI.   GI: No nausea, vomiting, hematemesis, melena, or hematochezia.   : No urinary frequency, dysuria, or hematuria.   Integument: Negative.   Psychiatric: As per HPI.  Neuro: Negative.   Endocrinology: Negative.   Musculoskeletal:gout is under control     EXAM:  BP (!) 80/0 (BP Location: Right arm, Patient Position: Chair, Cuff Size: Adult Regular)   Ht 1.753 m (5' 9\")   Wt 84.4 kg (186 lb)   SpO2 98%   BMI 27.47 kg/m    General: appears comfortable, alert and articulate  Head: normocephalic, atraumatic  Eyes: anicteric sclera, EOMI  Neck: no adenopathy  Orophyarynx: moist mucosa, no lesions, dentition intact  Heart: regular, S1/S2, grade 2/6 JENNY best heard at LLSB, no gallop or rub, JVP is flat  Lungs: clear, no rales or wheezing  Abdomen: soft, non-tender, bowel sounds present, no hepatomegaly  Extremities: no clubbing, cyanosis or LE edema  Neurological: normal speech and affect, no gross motor deficits    VAD Interrogation: VAD interrogation reviewed with VAD coordinator. Agree with findings. No PI events, power spikes, speed drops, or other findings suspicious of pump malfunction noted.     Labs:  Reviewed INR, BMP, albumin     Diagnostics:  Last echo reviewed     Assessment and Plan:   Mr. Danielito Chu is a 63yr old male with a history of NICM (LVEF at dong of 15%) s/p ICD (4/2017),  who underwent HM3 LVAD implant on 12/5/18 as DT ([facility], liver disease). He is overall recovering extremely well. He is going to start cardiac rehab when he returns to  [place] next week.     Chronic systolic heart failure secondary to NICM  Stage D  NYHA Class II  ACEi/ARB:  Yes, losartan   BB: yes metoprolol XL 12.5 mg QD  Aldosterone antagonist:  Adding this back today    SCD prophylaxis:  ICD  Fluid status:  euvolemic - changing bumex to 1 mg daily with addition of aldactone - stopping k replacement   MAP: at goal of 65-80   Anti-coag goal: ASA 81, INR " 2-3     Other:     Sleep Apnea Evaluation:  Recommend re-testing when he is fully recovered at month 3     NSVT controlled, has ICD, now on beta blocker     Transplant candidacy: I told him we would re-evaluate at 3 months. He is going to not smoke any recreational mariajuana and we will discuss his liver again in a few months.     Malnutrition: improving on exam and albumin is rising     35 minutes spent in direct care, >50% in counseling    Lorena Watkins MD     RTC as previously scheduled       CC  Patient Care Team:  Bryan Orellana MD as PCP - General  June, Lorena Lindsey MD as MD (Cardiology)  Sparkle Joel, RN as Nurse Coordinator (Cardiology)  Cecelia Palacios, RN as Nurse Coordinator (Cardiology)  Deysi Mattson APRN CNP as Nurse Practitioner (Cardiology)  Kalli Purcell APRN CNP as Nurse Practitioner (Cardiology)  Giselle Johnson, RN as Nurse Coordinator (Cardiology)  Candido Mendez MD as MD (Gastroenterology)  SELF, REFERRED

## 2019-01-10 NOTE — PROGRESS NOTES
Patient's friend Angel passed the LVAD test. He is cleared to do the drsng change and to be a 30 day caregiver.

## 2019-01-11 ENCOUNTER — CARE COORDINATION (OUTPATIENT)
Dept: CARDIOLOGY | Facility: CLINIC | Age: 64
End: 2019-01-11

## 2019-01-11 DIAGNOSIS — Z95.811 LVAD (LEFT VENTRICULAR ASSIST DEVICE) PRESENT (H): Primary | ICD-10-CM

## 2019-01-15 ENCOUNTER — OFFICE VISIT (OUTPATIENT)
Dept: CARDIOLOGY | Facility: CLINIC | Age: 64
End: 2019-01-15
Attending: INTERNAL MEDICINE
Payer: COMMERCIAL

## 2019-01-15 ENCOUNTER — ANCILLARY PROCEDURE (OUTPATIENT)
Dept: CARDIOLOGY | Facility: CLINIC | Age: 64
End: 2019-01-15
Payer: COMMERCIAL

## 2019-01-15 ENCOUNTER — ANTICOAGULATION THERAPY VISIT (OUTPATIENT)
Dept: ANTICOAGULATION | Facility: CLINIC | Age: 64
End: 2019-01-15

## 2019-01-15 ENCOUNTER — HOSPITAL ENCOUNTER (OUTPATIENT)
Dept: CARDIOLOGY | Facility: CLINIC | Age: 64
Discharge: HOME | End: 2019-01-15
Attending: INTERNAL MEDICINE | Admitting: INTERNAL MEDICINE
Payer: COMMERCIAL

## 2019-01-15 VITALS
HEIGHT: 69 IN | WEIGHT: 189.9 LBS | HEART RATE: 60 BPM | BODY MASS INDEX: 28.13 KG/M2 | OXYGEN SATURATION: 97 % | SYSTOLIC BLOOD PRESSURE: 78 MMHG

## 2019-01-15 DIAGNOSIS — I50.23 ACUTE ON CHRONIC SYSTOLIC HEART FAILURE (H): ICD-10-CM

## 2019-01-15 DIAGNOSIS — I47.29 NSVT (NONSUSTAINED VENTRICULAR TACHYCARDIA) (H): ICD-10-CM

## 2019-01-15 DIAGNOSIS — Z95.811 LVAD (LEFT VENTRICULAR ASSIST DEVICE) PRESENT (H): Primary | ICD-10-CM

## 2019-01-15 DIAGNOSIS — Z95.810 CARDIAC DEFIBRILLATOR IN SITU: ICD-10-CM

## 2019-01-15 DIAGNOSIS — I10 ESSENTIAL HYPERTENSION: ICD-10-CM

## 2019-01-15 DIAGNOSIS — F12.10 MARIJUANA ABUSE: ICD-10-CM

## 2019-01-15 DIAGNOSIS — Z95.811 LVAD (LEFT VENTRICULAR ASSIST DEVICE) PRESENT (H): ICD-10-CM

## 2019-01-15 DIAGNOSIS — I50.23 ACUTE ON CHRONIC SYSTOLIC CONGESTIVE HEART FAILURE (H): Primary | ICD-10-CM

## 2019-01-15 LAB
ALBUMIN SERPL-MCNC: 3.5 G/DL (ref 3.4–5)
ALP SERPL-CCNC: 128 U/L (ref 40–150)
ALT SERPL W P-5'-P-CCNC: 14 U/L (ref 0–70)
ANION GAP SERPL CALCULATED.3IONS-SCNC: 6 MMOL/L (ref 3–14)
AST SERPL W P-5'-P-CCNC: 18 U/L (ref 0–45)
BILIRUB SERPL-MCNC: 0.8 MG/DL (ref 0.2–1.3)
BUN SERPL-MCNC: 15 MG/DL (ref 7–30)
CALCIUM SERPL-MCNC: 8.9 MG/DL (ref 8.5–10.1)
CHLORIDE SERPL-SCNC: 102 MMOL/L (ref 94–109)
CO2 SERPL-SCNC: 28 MMOL/L (ref 20–32)
CREAT SERPL-MCNC: 0.78 MG/DL (ref 0.66–1.25)
ERYTHROCYTE [DISTWIDTH] IN BLOOD BY AUTOMATED COUNT: 17 % (ref 10–15)
GFR SERPL CREATININE-BSD FRML MDRD: >90 ML/MIN/{1.73_M2}
GLUCOSE SERPL-MCNC: 113 MG/DL (ref 70–99)
HCT VFR BLD AUTO: 39.9 % (ref 40–53)
HGB BLD-MCNC: 12.1 G/DL (ref 13.3–17.7)
INR PPP: 2.52 (ref 0.86–1.14)
LDH SERPL L TO P-CCNC: 233 U/L (ref 85–227)
MCH RBC QN AUTO: 27.9 PG (ref 26.5–33)
MCHC RBC AUTO-ENTMCNC: 30.3 G/DL (ref 31.5–36.5)
MCV RBC AUTO: 92 FL (ref 78–100)
PLATELET # BLD AUTO: 503 10E9/L (ref 150–450)
POTASSIUM SERPL-SCNC: 3.6 MMOL/L (ref 3.4–5.3)
PROT SERPL-MCNC: 8.5 G/DL (ref 6.8–8.8)
RBC # BLD AUTO: 4.34 10E12/L (ref 4.4–5.9)
SODIUM SERPL-SCNC: 135 MMOL/L (ref 133–144)
WBC # BLD AUTO: 8.6 10E9/L (ref 4–11)

## 2019-01-15 PROCEDURE — 83615 LACTATE (LD) (LDH) ENZYME: CPT | Performed by: INTERNAL MEDICINE

## 2019-01-15 PROCEDURE — 85027 COMPLETE CBC AUTOMATED: CPT | Performed by: INTERNAL MEDICINE

## 2019-01-15 PROCEDURE — 93750 INTERROGATION VAD IN PERSON: CPT | Mod: ZF

## 2019-01-15 PROCEDURE — 36415 COLL VENOUS BLD VENIPUNCTURE: CPT | Performed by: INTERNAL MEDICINE

## 2019-01-15 PROCEDURE — 85610 PROTHROMBIN TIME: CPT | Performed by: INTERNAL MEDICINE

## 2019-01-15 PROCEDURE — 93306 TTE W/DOPPLER COMPLETE: CPT

## 2019-01-15 PROCEDURE — G0463 HOSPITAL OUTPT CLINIC VISIT: HCPCS | Mod: 25,ZF

## 2019-01-15 PROCEDURE — 93306 TTE W/DOPPLER COMPLETE: CPT | Mod: 26 | Performed by: INTERNAL MEDICINE

## 2019-01-15 PROCEDURE — 80053 COMPREHEN METABOLIC PANEL: CPT | Performed by: INTERNAL MEDICINE

## 2019-01-15 PROCEDURE — 93750 INTERROGATION VAD IN PERSON: CPT | Performed by: NURSE PRACTITIONER

## 2019-01-15 PROCEDURE — 99215 OFFICE O/P EST HI 40 MIN: CPT | Mod: 25 | Performed by: NURSE PRACTITIONER

## 2019-01-15 PROCEDURE — G0463 HOSPITAL OUTPT CLINIC VISIT: HCPCS | Mod: 25

## 2019-01-15 RX ORDER — LOSARTAN POTASSIUM 25 MG/1
25 TABLET ORAL DAILY
Qty: 30 TABLET | Refills: 3 | Status: SHIPPED | OUTPATIENT
Start: 2019-01-15 | End: 2019-05-05

## 2019-01-15 RX ORDER — BUMETANIDE 1 MG/1
1 TABLET ORAL DAILY
Qty: 60 TABLET | Refills: 3 | Status: SHIPPED | OUTPATIENT
Start: 2019-01-15 | End: 2019-01-24

## 2019-01-15 RX ORDER — POTASSIUM CHLORIDE 1500 MG/1
20 TABLET, EXTENDED RELEASE ORAL DAILY
Qty: 90 TABLET | Refills: 3 | Status: SHIPPED | OUTPATIENT
Start: 2019-01-15 | End: 2019-02-13

## 2019-01-15 RX ORDER — SPIRONOLACTONE 25 MG/1
25 TABLET ORAL DAILY
Qty: 180 TABLET | Refills: 3 | Status: SHIPPED | OUTPATIENT
Start: 2019-01-15 | End: 2019-01-24

## 2019-01-15 ASSESSMENT — MIFFLIN-ST. JEOR: SCORE: 1651.76

## 2019-01-15 ASSESSMENT — PAIN SCALES - GENERAL: PAINLEVEL: NO PAIN (0)

## 2019-01-15 NOTE — PROGRESS NOTES
HPI: Fabiano is a 63-year-old gentleman with a past medical history of diabetes mellitus 2, hypertension, hyperlipidemia, SHIRLEY, mitral regurgitation, s/p ICD placement , and nonischemic cardiomyopathy with an EF of 15% s/p HeartMate 3 LVAD implant on 18 as destination therapy secondary to marijuana abuse and liver disease, complicated by postoperative Flolan and pressor support.    Overall Fabiano is feeling well.  He only develops shortness of breath if he walks too quickly.  Otherwise he is able to walk around and complete his activities of daily living without any shortness of breath.  He has not started cardiac rehab yet.  He denies shortness of breath at rest, PND, orthopnea, edema, chest pain, palpitations, lightheadedness, dizziness, near syncopal/syncopal episodes.  He has gained a few pounds per his report but states his appetite has improved.  He has been eating out at restaurants more and has had more dietary indiscretion.    He currently has no LVAD concerns.  Denies HA, neurological changes, hematuria, hematochezia, melena, epistaxis, fever, chills or any concerns for driveline infection.     PAST MEDICAL HISTORY:  Past Medical History:   Diagnosis Date     Acute systolic congestive heart failure (H) 2017     Diabetes (H)      HTN (hypertension)      Hyperlipidemia      Liver disease      LVAD (left ventricular assist device) present (H)      3      Marijuana abuse      Mitral regurgitation      Nocturnal oxygen desaturation 3/29/2018     Nonischemic cardiomyopathy (H) 2017     SHIRLEY (obstructive sleep apnea)      Pacemaker 2017    ICD 17       FAMILY HISTORY:  Family History   Problem Relation Age of Onset     Heart Failure Father          at age 86     Heart Failure Paternal Uncle          at 66      Myocardial Infarction Paternal Uncle          at age 62     Coronary Artery Disease Mother          during CABG at age 41       SOCIAL HISTORY:  Social History      Socioeconomic History     Marital status: Single     Spouse name: None     Number of children: None     Years of education: None     Highest education level: None   Social Needs     Financial resource strain: None     Food insecurity - worry: None     Food insecurity - inability: None     Transportation needs - medical: None     Transportation needs - non-medical: None   Occupational History     None   Tobacco Use     Smoking status: Former Smoker     Smokeless tobacco: Never Used     Tobacco comment: quit 3/2017   Substance and Sexual Activity     Alcohol use: No     Frequency: Never     Comment: social     Drug use: No     Sexual activity: None   Other Topics Concern     Parent/sibling w/ CABG, MI or angioplasty before 65F 55M? Not Asked   Social History Narrative     None       CURRENT MEDICATIONS:  Current Outpatient Medications   Medication Sig Dispense Refill     acetaminophen (TYLENOL) 325 MG tablet Take 2 tablets (650 mg) by mouth every 6 hours as needed for pain 100 tablet 0     allopurinol (ZYLOPRIM) 100 MG tablet Take 2 tablets (200 mg) by mouth daily 60 tablet 5     artificial saliva (BIOTENE DRY MOUTHWASH) LIQD liquid Swish and spit 10 mLs in mouth 4 times daily as needed for dry mouth 59 mL 0     aspirin (ASA) 81 MG chewable tablet Take 1 tablet (81 mg) by mouth daily 120 tablet 0     Blood Glucose Monitoring Suppl (BLOOD GLUCOSE MONITOR SYSTEM) w/Device KIT three times a week       bumetanide (BUMEX) 1 MG tablet Take 1 tablet (1 mg) by mouth daily 60 tablet 0     colchicine (COLCYRS) 0.6 MG tablet Take 1 tablet (0.6 mg) by mouth 2 times daily 60 tablet 3     hydrocortisone (CORTAID) 1 % external cream Apply topically 2 times daily as needed for itching 1 g 0     losartan (COZAAR) 25 MG tablet Take 1 tablet (25 mg) by mouth daily 30 tablet 0     melatonin 3 MG tablet Take 1 tablet (3 mg) by mouth At Bedtime 30 tablet 0     metoprolol succinate ER (TOPROL XL) 25 MG 24 hr tablet Take 0.5 tablets  "(12.5 mg) by mouth daily 45 tablet 1     omeprazole (PRILOSEC) 20 MG DR capsule Take 1 capsule (20 mg) by mouth every morning (before breakfast) 90 capsule 3     predniSONE (DELTASONE) 20 MG tablet Take 40 mg by mouth once as needed. (Patient not taking: Reported on 1/10/2019.) 5 tablet 0     senna-docusate (SENOKOT-S/PERICOLACE) 8.6-50 MG tablet Take 2 tablets by mouth daily as needed for constipation 60 tablet 0     spironolactone (ALDACTONE) 25 MG tablet Take 0.5 tablets (12.5 mg) by mouth daily 180 tablet 0     traZODone (DESYREL) 100 MG tablet Take 1 tablet (100 mg) by mouth nightly as needed for sleep 30 tablet 0     vitamin B1 (THIAMINE) 100 MG tablet Take 1 tablet (100 mg) by mouth daily 30 tablet 0     warfarin (COUMADIN) 2 MG tablet Take 3 mg PO daily at 6 pm until next INR check, then dose per INR goal 2-3 150 tablet 1       ROS:  Constitutional: Denies fever, chills, or diaphoresis.  + Weight gain.    ENT: Denies visual disturbance, hearing loss, epistaxis, vertigo.  Respiratory:  See HPI.     Cardiovascular: As per HPI.   GI: Denies nausea, vomiting, diarrhea, hematemesis, melena, or hematochezia.   : Denies urinary frequency, dysuria, or hematuria.   Integument: Negative for bruising, rash, or pruritis.  Psychiatric: Negative for anxiety, depression, sleep disturbance, or mood changes.   Neuro: Negative for headaches, syncope, numbness or tingling.   Endocrinology: + Diabetes mellitus.  Musculoskeletal: Negative for gout, joint stiffness, swelling, or muscle weakness    EXAM:  BP (!) 78/0 (BP Location: Left arm, Patient Position: Chair, Cuff Size: Adult Large)   Pulse 60   Ht 1.753 m (5' 9\")   Wt 86.1 kg (189 lb 14.4 oz)   SpO2 97%   BMI 28.04 kg/m    General: alert, articulate, and in no acute distress.  HEENT: normocephalic, atraumatic, anicteric sclera, EOMI, mucosa moist, no cyanosis.    Neck: neck supple.  No adenopathy, masses, or carotid bruits.  JVP 11 cm  Heart: LVAD hum present  Lungs: " clear, no rales, ronchi, or wheezing.  No accessory muscle use, respirations unlabored.   Abdomen: soft, non-tender, bowel sounds present, no hepatomegaly  Extremities: 2+ pitting edema.  Legs are taut.  No palpable pedal pulses.  Neurological: alert and oriented x 3.  normal speech and affect, no gross motor deficits  Skin:  No ecchymoses, rashes, or clubbing.       Labs:  CBC RESULTS:  Lab Results   Component Value Date    WBC 8.6 01/15/2019    RBC 4.34 (L) 01/15/2019    HGB 12.1 (L) 01/15/2019    HCT 39.9 (L) 01/15/2019    MCV 92 01/15/2019    MCH 27.9 01/15/2019    MCHC 30.3 (L) 01/15/2019    RDW 17.0 (H) 01/15/2019     (H) 01/15/2019       CMP RESULTS:  Lab Results   Component Value Date     01/15/2019    POTASSIUM 3.6 01/15/2019    CHLORIDE 102 01/15/2019    CO2 28 01/15/2019    ANIONGAP 6 01/15/2019     (H) 01/15/2019    BUN 15 01/15/2019    CR 0.78 01/15/2019    GFRESTIMATED >90 01/15/2019    GFRESTBLACK >90 01/15/2019    ASHLEE 8.9 01/15/2019    BILITOTAL 0.8 01/15/2019    ALBUMIN 3.5 01/15/2019    ALKPHOS 128 01/15/2019    ALT 14 01/15/2019    AST 18 01/15/2019        INR RESULTS:  Lab Results   Component Value Date    INR 2.52 (H) 01/15/2019       No components found for: CK  Lab Results   Component Value Date    MAG 2.1 2018     Lab Results   Component Value Date    NTBNP 2,244 (H) 2018       Most recent echocardiogram:  Recent Results (from the past 4320 hour(s))   ECHO LIMITED WITH OPTISON    Narrative    [id number]  [id number]  [id number]  [id number]^MAKEDA^WILLI^GORAN           [facility],[place]  Echocardiography Laboratory  [address] [place] [id number]     Name: JUAN SHEN  MRN: [id number]  : 1955  Study Date: 2018 07:01 AM  Age: 63 yrs  Gender: Male  Patient Location: [facility]  Reason For Study: CHF  Ordering Physician: WILLI ASHFORD  Referring Physician: [place], [facility]  Performed By: Steve Tracey     BSA:  2.0 m2  Height: 68 in  Weight: 181 lb  HR: 70  BP: 103/54 mmHg  _____________________________________________________________________________  __        Procedure  Limited Portable Echo Adult. Contrast Optison.  _____________________________________________________________________________  __        Interpretation Summary  Limited study. Severely dilated LV with severely reduced LV systolic function,  LVEF=10-15% (traced 12%.)  Valves were not assessed on this limited study.  Mildly dilated RV with mildly reduced function.  Dilation of the inferior vena cava is present with abnormal respiratory  variation in diameter. Estimated mean right atrial pressure is 15 mmHg  (significantly elevated).  This study was compared to a TTE from 10/23/2018: There has been no  significant change as assessed.     _____________________________________________________________________________  __        Left Ventricle  Severe left ventricular dilation is present. Severely (EF 10-20%) reduced left  ventricular function is present. Calculated LVEF 12% using traced biplane with  biplane. Severe diffuse hypokinesis is present. There is no thrombus.     Right Ventricle  Mild right ventricular dilation is present. Global right ventricular function  is mildly reduced. A pacemaker lead is noted in the right ventricle.     Atria  Severe biatrial enlargement is present.     Mitral Valve  The mitral valve cannot be assessed.        Aortic Valve  Aortic valve morphology is not well visualized but appears calcified. Not  interrogated with Doppler.     Tricuspid Valve  The tricuspid valve cannot be assessed.     Vessels  The aorta root cannot be assessed. The thoracic aorta cannot be assessed. The  pulmonary artery cannot be assessed. Dilation of the inferior vena cava is  present with abnormal respiratory variation in diameter. Estimated mean right  atrial pressure is 15 mmHg (significantly elevated).     Pericardium  No pericardial effusion is  present.     Attestation  I have personally viewed the imaging and agree with the interpretation and  report as documented by the fellow, Kane Roman, and/or edited by me.     _____________________________________________________________________________  __  MMode/2D Measurements & Calculations  IVSd: 0.98 cm  LVIDd: 7.2 cm  LVIDs: 6.6 cm  LVPWd: 0.87 cm  FS: 7.4 %  LV mass(C)d: 302.4 grams  LV mass(C)dI: 154.4 grams/m2     LA dimension: 4.7 cm  LA Volume (BP): 162.0 ml  LA Volume Index (BP): 82.7 ml/m2  RWT: 0.24              _____________________________________________________________________________  __        Report approved by: Baron Bello 2018 08:38 AM      ECHO COMPLETE WITH OPTISON    Narrative    [id number]  [id number]  [id number]  [id number]^ELISE^MAYURI           [facility],[place]  Echocardiography Laboratory  [address] [place] [id number]     Name: JUAN SHEN  MRN: [id number]  : 1955  Study Date: 10/23/2018 04:06 PM  Age: 63 yrs  Gender: Male  Patient Location: [facility]  Reason For Study: Heart Failure  Ordering Physician: MAYURI OLIVARES  Referring Physician: [place], [facility]  Performed By: Bryan Saez RDCS     BSA: 2.0 m2  Height: 69 in  Weight: 194 lb  BP: 106/74 mmHg  _____________________________________________________________________________  __        Procedure  Echocardiogram with two-dimensional, color and spectral Doppler performed.  Contrast Optison. Optison (NDC #2661-4836-23) given intravenously. Patient was  given 6 ml mixture of 3 ml Optison and 6 ml saline. 3 ml wasted.  _____________________________________________________________________________  __        Interpretation Summary  Severe left ventricular dilation is present. Severe diffuse hypokinesis is  present. Severely reduced left ventricular systolic function, EF is 18%.  Global right ventricular function is mildly reduced.  Mild  aortic stenosis is present, mean gradient across the aortic valve is 10  mmHg. Mild aortic insufficiency is present. Mild to moderate tricuspid  insufficiency is present.  Estimated pulmonary artery systolic pressure is 29 mmHg plus right atrial  pressure. Estimated mean right atrial pressure is 15 mmHg (significantly  elevated).  No pericardial effusion is present.  _____________________________________________________________________________  __        Left Ventricle  Severe left ventricular dilation is present. EDVi = 151 mL/m2. Severe diffuse  hypokinesis is present. Severely reduced left ventricular systolic function,  EF is 18%.     Right Ventricle  Mild right ventricular dilation is present. Global right ventricular function  is mildly reduced. A pacemaker lead is noted in the right ventricle.     Atria  Severe biatrial enlargement is present.     Mitral Valve  Mild mitral annular calcification is present. Mild mitral insufficiency is  present.        Aortic Valve  Mild aortic insufficiency is present. Mild aortic stenosis is present. The  peak aortic velocity is 2.1 m/sec. The mean gradient across the aortic valve  is10 mmHg.     Tricuspid Valve  Mild to moderate tricuspid insufficiency is present. Estimated pulmonary  artery systolic pressure is 29 mmHg plus right atrial pressure.     Pulmonic Valve  The pulmonic valve is normal.     Vessels  Dilation of the inferior vena cava is present with abnormal respiratory  variation in diameter. Estimated mean right atrial pressure is 15 mmHg  (significantly elevated).     Pericardium  No pericardial effusion is present.        Compared to Previous Study  This study was compared with the study from 10.17.18, LV function is  unchanged .  _____________________________________________________________________________  __  MMode/2D Measurements & Calculations     LA Volume (BP): 97.7 ml  LA Volume Index (BP): 47.9 ml/m2        Doppler Measurements & Calculations  MV E  max amadeo: 196.5 cm/sec  MV A max amadeo: 63.1 cm/sec  MV E/A: 3.1  MV dec slope: 701.0 cm/sec2  Ao V2 max: 209.0 cm/sec  Ao max P.0 mmHg  Ao V2 mean: 151.0 cm/sec  Ao mean PG: 10.0 mmHg  Ao V2 VTI: 35.0 cm  AI P1/2t: 342.1 msec  LV V1 max P.2 mmHg  LV V1 max: 73.4 cm/sec  LV V1 VTI: 11.6 cm  TR max amadeo: 230.8 cm/sec  TR max P.5 mmHg     AV Amadeo Ratio (DI): 0.35  E/E' av.9  Lateral E/e': 40.3  Medial E/e': 41.5     _____________________________________________________________________________  __           Report approved by: Baron Alegria 10/23/2018 05:15 PM      ECHO COMPLETE WITH OPTISON    Narrative    [id number]  [id number]  [id number]  [id number]^GAETANO^DRISS^ISSAC           [facility],[place]  Echocardiography Laboratory  [address] [place] [address]     Name: JUAN SHEN  MRN: [id number]  : 1955  Study Date: 10/17/2018 01:30 PM  Age: 63 yrs  Gender: Male  Patient Location: [facility]  Reason For Study: , Chronic systolic heart failure (H)  Ordering Physician: DRISS SALTER  Referring Physician: DRISS SALTER  Performed By: PASHA Baum     BSA: 2.1 m2  Height: 69 in  Weight: 200 lb  BP: 105/70 mmHg  _____________________________________________________________________________  __        Procedure  Echocardiogram with two-dimensional, color and spectral Doppler performed.  Contrast Optison. Optison (NDC #4965-8606-23) given intravenously. Patient was  given 6.0 ml mixture of 3 ml Optison and 6 ml saline. 3.0 ml wasted.  _____________________________________________________________________________  __        Interpretation Summary  Severe left ventricular dilation is present.  Severe diffuse hypokinesis is present.  The Ejection Fraction is estimated at 10-15%.  Mild to moderate right ventricular dilation is present.  Global right ventricular function is mildly reduced.  Severe right atrial enlargement is present.  Severe  tricuspid insufficiency is present.  Pulmonary artery systolic pressure cannot be assessed.  Dilation of the inferior vena cava is present with abnormal respiratory  variation in diameter.  No pericardial effusion is present.  Worsened LV function and TR since lasr srudy.  _____________________________________________________________________________  __        Left Ventricle  Left ventricular wall thickness is normal. Severe left ventricular dilation is  present. Left ventricular diastolic function is indeterminate. The Ejection  Fraction is estimated at 10-15%. Severe diffuse hypokinesis is present.     Right Ventricle  Mild to moderate right ventricular dilation is present. Global right  ventricular function is mildly reduced. A pacemaker lead is noted in the right  ventricle.     Atria  Moderate left atrial enlargement is present. Severe right atrial enlargement  is present. The atrial septum is intact as assessed by color Doppler .     Mitral Valve  Mild mitral annular calcification is present. Mild mitral insufficiency is  present.        Aortic Valve  Mild aortic valve sclerosis is present. Mild aortic insufficiency is present.     Tricuspid Valve  Severe tricuspid insufficiency is present. Pulmonary artery systolic pressure  cannot be assessed.     Pulmonic Valve  The pulmonic valve is normal. Trace pulmonic insufficiency is present.     Vessels  The aorta root is normal. The pulmonary artery is normal. Dilation of the  inferior vena cava is present with abnormal respiratory variation in diameter.     Pericardium  No pericardial effusion is present.        Compared to Previous Study  Worsened LV function and TR since lasr srudy.  _____________________________________________________________________________  __  MMode/2D Measurements & Calculations     RVDd: 5.4 cm  IVSd: 0.84 cm  LVIDd: 6.7 cm  LVIDs: 6.6 cm  LVPWd: 0.90 cm  FS: 1.5 %  LV mass(C)d: 250.2 grams  LV mass(C)dI: 121.1 grams/m2  Ao root diam: 3.1  cm  LA dimension: 5.5 cm  asc Aorta Diam: 3.0 cm  LA/Ao: 1.7  LVOT diam: 2.1 cm  LVOT area: 3.5 cm2     EF(MOD-bp): 13.9 %  LA Volume (BP): 99.9 ml  RWT: 0.27  TAPSE: 1.5 cm        Doppler Measurements & Calculations  MV E max amadeo: 110.4 cm/sec  Ao V2 max: 203.5 cm/sec  Ao max P.5 mmHg  Ao V2 mean: 140.0 cm/sec  Ao mean P.7 mmHg  Ao V2 VTI: 32.5 cm  MONICA(I,D): 1.7 cm2  MONICA(V,D): 1.6 cm2  LV V1 max PG: 3.5 mmHg  LV V1 max: 93.2 cm/sec  LV V1 VTI: 15.8 cm     SV(LVOT): 55.5 ml  SI(LVOT): 26.9 ml/m2  TR max amadeo: 165.7 cm/sec  TR max P.2 mmHg  AV Amadeo Ratio (DI): 0.46  MONICA Index (cm2/m2): 0.83     _____________________________________________________________________________  __           Report approved by: Baron Villalba 10/17/2018 03:08 PM          Assessment and Plan:    In summary, Fabiano is a 63-year-old gentleman with nonischemic cardiomyopathy status post HeartMate 3 LVAD placement who appears hypervolemic today secondary to dietary indiscretion.  I have increased his Bumex to 1 mg in the morning and 0.5 mg in the evening in addition to increasing his spironolactone to 25 mg daily, and starting taking potassium 20 mEq daily, with plans to stop potassium if his recheck level is normal.    I reinforced the importance of following his sodium and fluid restrictions and reviewed that restaurant food is typically  high in sodium.  He agrees to monitor his sodium intake closer and will be preparing more home cooked meals now that he will be going home.    1.  Chronic systolic heart failure secondary to nonischemic cardiomyopathy.  Stage D  NYHA Class II  ACEi/ARB: yes, continue losartan 25 mg daily  BB: Yes, metoprolol XL 12.5 mg daily.   aldosterone antagonist: yes, increase spironolactone 25 mg daily.  SCD prophylaxis: ICD  Fluid status: hypervolemic, increase Bumex as outlined above.      2.  S/P LVAD implant as destination therapy with plans to reevaluate for possible transplant candidacy in 3  months.  Anticoagulation: Warfarin with INR goal of 2-3.  ACC managing.  Antiplatelet: Aspirin 81 mg daily.  MAP: Controlled at 78.  LDH: Stable at 233.  VAD Interrogation today: VAD interrogation reviewed with VAD coordinator. Agree with findings.  Some PI events with an isolated speed drop down to 5300.  No power spikes or other findings suspicious of pump malfunction noted.     3.  Hypertension: Controlled.  Continue metoprolol XL and losartan.    4.  Marijuana abuse: Currently abstinent.  Continue periodic screening for transplant candidacy.    5.  NSVT: Controlled.  Device check from today pending.  Continue metoprolol XL.    6.  Follow-up: BMP in 1 week.  Start cardiac rehab once he returns home. Follow-up as scheduled in the end of January in the LVAD clinic.    40 minutes spent face to face with patient with >50% in counseling, education, and coordination of care      Kylie NARANJO, CNP  Advanced Heart Failure/LVAD clinic

## 2019-01-15 NOTE — NURSING NOTE
1). PUMP DATA  Primary controller serial number: [id number]    HM 3:   Flow: 4.5 L/min,    Speed: 5500 RPMs,     PI: 3.6 ,  Power: 4.4 [place], Hct: 39     Primary controller   Back up battery: Patient use: 11, Replace in: 29  Months     Data downloaded: No   Equipment and driveline assessed for damage: Yes     Back up : Serial number: [id number]  Back up battery: Patient use: 11 Replace in: 28  Months  Programmed settings identical to the settings on the primary controller :Yes      Education complete: Yes   Charge the BACKUP controller s backup battery every 6 months  Perform a self test on BACKUP every 6 months  Change the MPU s batteries every 6 months:Yes      2). ALARMS  Alarms reported by patient since last pump evaluation: No  Alarms or other finding noted during pump data history and alarm download: Yes, Some PI events with one speed drop down to 5300.    Action Taken:  Reviewed data with patient: Yes      3). DRESSING CHANGE / DRIVELINE ASSESSMENT  Dressing change completed today: Yes  Appearance of Driveline site: clean, dry, no drainage    Driveline stabilization: Method: [employer]  Teaching reinforced on need for stabilization of Driveline.     4).Pt. Education    D:  Pt education provided.  I:  Pt educated on the following topics:  1. When to call 911.  Reviewed emergency situations.  2. What to do if my VAD Coordinator is not returning my call.  3. When to page the VAD Coordinator on Call (handout provided w/ frequent symptoms to report).  4. Pt practice paged the VAD Coordinator on Call.   5. Pt given page of stickers with the number for the VAD Coordinator on Call.  6. Pt given list of frequently used resources and their numbers.  Reviewed when to call each resource.  (Social Work, Financial Counselor, Coumadin Clinic, Device Nurse, ).  A:  Pt verbalized understanding.  P:  VAD Coordinator available for questions or concerns.  Will continue VAD education.

## 2019-01-15 NOTE — NURSING NOTE
Chief Complaint   Patient presents with     Follow Up     6 week follow up      Vitals were taken and medications were reconciled.     Angie Mclean,JOSE M  2:51 PM

## 2019-01-15 NOTE — PROGRESS NOTES
ANTICOAGULATION FOLLOW-UP CLINIC VISIT    Patient Name:  Danielito Chu  Date:  1/15/2019  Contact Type:  Telephone    SUBJECTIVE:        OBJECTIVE    INR   Date Value Ref Range Status   01/15/2019 2.52 (H) 0.86 - 1.14 Final     Factor 2 Assay   Date Value Ref Range Status   10/27/2018 70 60 - 140 % Final     Comment:     The Factor 2 activity level is not a screening test for the Prothrombin 63578   mutation.         ASSESSMENT / PLAN  No question data found.  Anticoagulation Summary  As of 1/15/2019    INR goal:   2.0-3.0   TTR:   64.4 % (3.4 wk)   INR used for dosin.52 (1/15/2019)   Warfarin maintenance plan:   No maintenance plan   Full warfarin instructions:   1/15: 3 mg; : 3 mg; : 2 mg; : 3 mg; : 3 mg; : 3 mg; : 3 mg   Plan last modified:   Kristie Fernandez RN (2018)   Next INR check:   2019   Priority:   INR   Target end date:   Indefinite    Indications    LVAD (left ventricular assist device) present (H) [Z95.811]             Anticoagulation Episode Summary     INR check location:       Preferred lab:       Send INR reminders to:    [facility]IC    Comments:   LVAD placed 18 ASA 81mg Daily Has Jantoven 2mg tablets   Please speak with sister Fani Navarrete [phone]  Pt will be staying at the [facility] until he is stable to go home.      Anticoagulation Care Providers     Provider Role Specialty Phone number    Lorena Watkins MD [facility] Cardiology [phone]            See the Encounter Report to view Anticoagulation Flowsheet and Dosing Calendar (Go to Encounters tab in chart review, and find the Anticoagulation Therapy Visit)    Left message for patient with results and dosing recommendations. Asked patient to call back to report any missed doses, falls, signs and symptoms of bleeding or clotting, any changes in health, medication, or diet. Asked patient to call back with any questions or concerns.     Bri Becerril RN    19  ADDENDUM  Spoke to Fani, patient's sister.  Updated calendar. Recommended Fabiano take 3mg today.  He is scheduled for an INR tomorrow.    Fady Avelar RN

## 2019-01-15 NOTE — LETTER
1/15/2019      RE: Danielito Chu  [address] [place] [address]       Dear Colleague,    Thank you for the opportunity to participate in the care of your patient, Danielito Chu, at the [facility]. Please see a copy of my visit note below.        HPI: Fabiano is a 63-year-old gentleman with a past medical history of diabetes mellitus 2, hypertension, hyperlipidemia, SHIRLEY, mitral regurgitation, s/p ICD placement 4/17, and nonischemic cardiomyopathy with an EF of 15% s/p HeartMate 3 LVAD implant on 12/5/18 as destination therapy secondary to marijuana abuse and liver disease, complicated by postoperative Flolan and pressor support.    Overall Fabiano is feeling well.  He only develops shortness of breath if he walks too quickly.  Otherwise he is able to walk around and complete his activities of daily living without any shortness of breath.  He has not started cardiac rehab yet.  He denies shortness of breath at rest, PND, orthopnea, edema, chest pain, palpitations, lightheadedness, dizziness, near syncopal/syncopal episodes.  He has gained a few pounds per his report but states his appetite has improved.  He has been eating out at restaurants more and has had more dietary indiscretion.    He currently has no LVAD concerns.  Denies HA, neurological changes, hematuria, hematochezia, melena, epistaxis, fever, chills or any concerns for driveline infection.     PAST MEDICAL HISTORY:  Past Medical History:   Diagnosis Date     Acute systolic congestive heart failure (H) 4/5/2017     Diabetes (H)      HTN (hypertension)      Hyperlipidemia      Liver disease      LVAD (left ventricular assist device) present (H)      3 12/18     Marijuana abuse      Mitral regurgitation      Nocturnal oxygen desaturation 3/29/2018     Nonischemic cardiomyopathy (H) 4/5/2017     SHIRLEY (obstructive sleep apnea)      Pacemaker 04/07/2017    ICD 4/7/17       FAMILY  HISTORY:  Family History   Problem Relation Age of Onset     Heart Failure Father          at age 86     Heart Failure Paternal Uncle          at 66      Myocardial Infarction Paternal Uncle          at age 62     Coronary Artery Disease Mother          during CABG at age 41       SOCIAL HISTORY:  Social History     Socioeconomic History     Marital status: Single     Spouse name: None     Number of children: None     Years of education: None     Highest education level: None   Social Needs     Financial resource strain: None     Food insecurity - worry: None     Food insecurity - inability: None     Transportation needs - medical: None     Transportation needs - non-medical: None   Occupational History     None   Tobacco Use     Smoking status: Former Smoker     Smokeless tobacco: Never Used     Tobacco comment: quit 3/2017   Substance and Sexual Activity     Alcohol use: No     Frequency: Never     Comment: social     Drug use: No     Sexual activity: None   Other Topics Concern     Parent/sibling w/ CABG, MI or angioplasty before 65F 55M? Not Asked   Social History Narrative     None       CURRENT MEDICATIONS:  Current Outpatient Medications   Medication Sig Dispense Refill     acetaminophen (TYLENOL) 325 MG tablet Take 2 tablets (650 mg) by mouth every 6 hours as needed for pain 100 tablet 0     allopurinol (ZYLOPRIM) 100 MG tablet Take 2 tablets (200 mg) by mouth daily 60 tablet 5     artificial saliva (BIOTENE DRY MOUTHWASH) LIQD liquid Swish and spit 10 mLs in mouth 4 times daily as needed for dry mouth 59 mL 0     aspirin (ASA) 81 MG chewable tablet Take 1 tablet (81 mg) by mouth daily 120 tablet 0     Blood Glucose Monitoring Suppl (BLOOD GLUCOSE MONITOR SYSTEM) w/Device KIT three times a week       bumetanide (BUMEX) 1 MG tablet Take 1 tablet (1 mg) by mouth daily 60 tablet 0     colchicine (COLCYRS) 0.6 MG tablet Take 1 tablet (0.6 mg) by mouth 2 times daily 60 tablet 3     hydrocortisone  (CORTAID) 1 % external cream Apply topically 2 times daily as needed for itching 1 g 0     losartan (COZAAR) 25 MG tablet Take 1 tablet (25 mg) by mouth daily 30 tablet 0     melatonin 3 MG tablet Take 1 tablet (3 mg) by mouth At Bedtime 30 tablet 0     metoprolol succinate ER (TOPROL XL) 25 MG 24 hr tablet Take 0.5 tablets (12.5 mg) by mouth daily 45 tablet 1     omeprazole (PRILOSEC) 20 MG DR capsule Take 1 capsule (20 mg) by mouth every morning (before breakfast) 90 capsule 3     predniSONE (DELTASONE) 20 MG tablet Take 40 mg by mouth once as needed. (Patient not taking: Reported on 1/10/2019.) 5 tablet 0     senna-docusate (SENOKOT-S/PERICOLACE) 8.6-50 MG tablet Take 2 tablets by mouth daily as needed for constipation 60 tablet 0     spironolactone (ALDACTONE) 25 MG tablet Take 0.5 tablets (12.5 mg) by mouth daily 180 tablet 0     traZODone (DESYREL) 100 MG tablet Take 1 tablet (100 mg) by mouth nightly as needed for sleep 30 tablet 0     vitamin B1 (THIAMINE) 100 MG tablet Take 1 tablet (100 mg) by mouth daily 30 tablet 0     warfarin (COUMADIN) 2 MG tablet Take 3 mg PO daily at 6 pm until next INR check, then dose per INR goal 2-3 150 tablet 1       ROS:  Constitutional: Denies fever, chills, or diaphoresis.  + Weight gain.    ENT: Denies visual disturbance, hearing loss, epistaxis, vertigo.  Respiratory:  See HPI.     Cardiovascular: As per HPI.   GI: Denies nausea, vomiting, diarrhea, hematemesis, melena, or hematochezia.   : Denies urinary frequency, dysuria, or hematuria.   Integument: Negative for bruising, rash, or pruritis.  Psychiatric: Negative for anxiety, depression, sleep disturbance, or mood changes.   Neuro: Negative for headaches, syncope, numbness or tingling.   Endocrinology: + Diabetes mellitus.  Musculoskeletal: Negative for gout, joint stiffness, swelling, or muscle weakness    EXAM:  BP (!) 78/0 (BP Location: Left arm, Patient Position: Chair, Cuff Size: Adult Large)   Pulse 60   Ht  "1.753 m (5' 9\")   Wt 86.1 kg (189 lb 14.4 oz)   SpO2 97%   BMI 28.04 kg/m     General: alert, articulate, and in no acute distress.  HEENT: normocephalic, atraumatic, anicteric sclera, EOMI, mucosa moist, no cyanosis.    Neck: neck supple.  No adenopathy, masses, or carotid bruits.  JVP 11 cm  Heart: LVAD hum present  Lungs: clear, no rales, ronchi, or wheezing.  No accessory muscle use, respirations unlabored.   Abdomen: soft, non-tender, bowel sounds present, no hepatomegaly  Extremities: 2+ pitting edema.  Legs are taut.  No palpable pedal pulses.  Neurological: alert and oriented x 3.  normal speech and affect, no gross motor deficits  Skin:  No ecchymoses, rashes, or clubbing.       Labs:  CBC RESULTS:  Lab Results   Component Value Date    WBC 8.6 01/15/2019    RBC 4.34 (L) 01/15/2019    HGB 12.1 (L) 01/15/2019    HCT 39.9 (L) 01/15/2019    MCV 92 01/15/2019    MCH 27.9 01/15/2019    MCHC 30.3 (L) 01/15/2019    RDW 17.0 (H) 01/15/2019     (H) 01/15/2019       CMP RESULTS:  Lab Results   Component Value Date     01/15/2019    POTASSIUM 3.6 01/15/2019    CHLORIDE 102 01/15/2019    CO2 28 01/15/2019    ANIONGAP 6 01/15/2019     (H) 01/15/2019    BUN 15 01/15/2019    CR 0.78 01/15/2019    GFRESTIMATED >90 01/15/2019    GFRESTBLACK >90 01/15/2019    ASHLEE 8.9 01/15/2019    BILITOTAL 0.8 01/15/2019    ALBUMIN 3.5 01/15/2019    ALKPHOS 128 01/15/2019    ALT 14 01/15/2019    AST 18 01/15/2019        INR RESULTS:  Lab Results   Component Value Date    INR 2.52 (H) 01/15/2019       No components found for: CK  Lab Results   Component Value Date    MAG 2.1 12/13/2018     Lab Results   Component Value Date    NTBNP 2,244 (H) 12/20/2018       Most recent echocardiogram:  Recent Results (from the past 4320 hour(s))   ECHO LIMITED WITH OPTISON    Narrative    [id number]  [id number]  [id number]  [id number]^MAKEDA^WILLI^GORAN           [facility],[place]  Echocardiography Laboratory  [address] [place] [address]     Name: JUAN SHEN  MRN: [id number]  : 1955  Study Date: 2018 07:01 AM  Age: 63 yrs  Gender: Male  Patient Location: [facility]  Reason For Study: CHF  Ordering Physician: WILLI ASHFORD  Referring Physician: [place]  Performed By: Steve Tracey     BSA: 2.0 m2  Height: 68 in  Weight: 181 lb  HR: 70  BP: 103/54 mmHg  _____________________________________________________________________________  __        Procedure  Limited Portable Echo Adult. Contrast Optison.  _____________________________________________________________________________  __        Interpretation Summary  Limited study. Severely dilated LV with severely reduced LV systolic function,  LVEF=10-15% (traced 12%.)  Valves were not assessed on this limited study.  Mildly dilated RV with mildly reduced function.  Dilation of the inferior vena cava is present with abnormal respiratory  variation in diameter. Estimated mean right atrial pressure is 15 mmHg  (significantly elevated).  This study was compared to a TTE from 10/23/2018: There has been no  significant change as assessed.     _____________________________________________________________________________  __        Left Ventricle  Severe left ventricular dilation is present. Severely (EF 10-20%) reduced left  ventricular function is present. Calculated LVEF 12% using traced biplane with  biplane. Severe diffuse hypokinesis is present. There is no thrombus.     Right Ventricle  Mild right ventricular dilation is present. Global right ventricular function  is mildly reduced. A pacemaker lead is noted in the right ventricle.     Atria  Severe biatrial enlargement is present.     Mitral Valve  The mitral valve cannot be assessed.        Aortic Valve  Aortic valve morphology is not well visualized but appears calcified. Not  interrogated with Doppler.     Tricuspid Valve  The tricuspid valve cannot be assessed.      Vessels  The aorta root cannot be assessed. The thoracic aorta cannot be assessed. The  pulmonary artery cannot be assessed. Dilation of the inferior vena cava is  present with abnormal respiratory variation in diameter. Estimated mean right  atrial pressure is 15 mmHg (significantly elevated).     Pericardium  No pericardial effusion is present.     Attestation  I have personally viewed the imaging and agree with the interpretation and  report as documented by the fellow, Kane Roman, and/or edited by me.     _____________________________________________________________________________  __  MMode/2D Measurements & Calculations  IVSd: 0.98 cm  LVIDd: 7.2 cm  LVIDs: 6.6 cm  LVPWd: 0.87 cm  FS: 7.4 %  LV mass(C)d: 302.4 grams  LV mass(C)dI: 154.4 grams/m2     LA dimension: 4.7 cm  LA Volume (BP): 162.0 ml  LA Volume Index (BP): 82.7 ml/m2  RWT: 0.24              _____________________________________________________________________________  __        Report approved by: Baron Bello 2018 08:38 AM      ECHO COMPLETE WITH OPTISON    Narrative    [id number]  [id number]  [id number]  [id number]^ELISE^MAYURI           [facility],[place]  Echocardiography Laboratory  [address] [place] [address]     Name: JUAN SHEN  MRN: [id number]  : 1955  Study Date: 10/23/2018 04:06 PM  Age: 63 yrs  Gender: Male  Patient Location: [facility]  Reason For Study: Heart Failure  Ordering Physician: MAYURI OLIVARES  Referring Physician: [place], [facility]  Performed By: Bryan Saez RDCS     BSA: 2.0 m2  Height: 69 in  Weight: 194 lb  BP: 106/74 mmHg  _____________________________________________________________________________  __        Procedure  Echocardiogram with two-dimensional, color and spectral Doppler performed.  Contrast Optison. Optison (NDC #4140-0074-97) given intravenously. Patient was  given 6 ml mixture of 3 ml Optison and 6 ml  saline. 3 ml wasted.  _____________________________________________________________________________  __        Interpretation Summary  Severe left ventricular dilation is present. Severe diffuse hypokinesis is  present. Severely reduced left ventricular systolic function, EF is 18%.  Global right ventricular function is mildly reduced.  Mild aortic stenosis is present, mean gradient across the aortic valve is 10  mmHg. Mild aortic insufficiency is present. Mild to moderate tricuspid  insufficiency is present.  Estimated pulmonary artery systolic pressure is 29 mmHg plus right atrial  pressure. Estimated mean right atrial pressure is 15 mmHg (significantly  elevated).  No pericardial effusion is present.  _____________________________________________________________________________  __        Left Ventricle  Severe left ventricular dilation is present. EDVi = 151 mL/m2. Severe diffuse  hypokinesis is present. Severely reduced left ventricular systolic function,  EF is 18%.     Right Ventricle  Mild right ventricular dilation is present. Global right ventricular function  is mildly reduced. A pacemaker lead is noted in the right ventricle.     Atria  Severe biatrial enlargement is present.     Mitral Valve  Mild mitral annular calcification is present. Mild mitral insufficiency is  present.        Aortic Valve  Mild aortic insufficiency is present. Mild aortic stenosis is present. The  peak aortic velocity is 2.1 m/sec. The mean gradient across the aortic valve  is10 mmHg.     Tricuspid Valve  Mild to moderate tricuspid insufficiency is present. Estimated pulmonary  artery systolic pressure is 29 mmHg plus right atrial pressure.     Pulmonic Valve  The pulmonic valve is normal.     Vessels  Dilation of the inferior vena cava is present with abnormal respiratory  variation in diameter. Estimated mean right atrial pressure is 15 mmHg  (significantly elevated).     Pericardium  No pericardial effusion is present.         Compared to Previous Study  This study was compared with the study from 10.17.18, LV function is  unchanged .  _____________________________________________________________________________  __  MMode/2D Measurements & Calculations     LA Volume (BP): 97.7 ml  LA Volume Index (BP): 47.9 ml/m2        Doppler Measurements & Calculations  MV E max amadeo: 196.5 cm/sec  MV A max amadeo: 63.1 cm/sec  MV E/A: 3.1  MV dec slope: 701.0 cm/sec2  Ao V2 max: 209.0 cm/sec  Ao max P.0 mmHg  Ao V2 mean: 151.0 cm/sec  Ao mean PG: 10.0 mmHg  Ao V2 VTI: 35.0 cm  AI P1/2t: 342.1 msec  LV V1 max P.2 mmHg  LV V1 max: 73.4 cm/sec  LV V1 VTI: 11.6 cm  TR max amadeo: 230.8 cm/sec  TR max P.5 mmHg     AV Amadeo Ratio (DI): 0.35  E/E' av.9  Lateral E/e': 40.3  Medial E/e': 41.5     _____________________________________________________________________________  __           Report approved by: Baron Alegria 10/23/2018 05:15 PM      ECHO COMPLETE WITH OPTISON    Narrative    [id number]  [id number]  [id number]  [id number]^GAETANO^DRISS^ISSAC           [facility],[place]  Echocardiography Laboratory  [address] [place] [address]     Name: JUAN SHEN  MRN: [id number]  : 1955  Study Date: 10/17/2018 01:30 PM  Age: 63 yrs  Gender: Male  Patient Location: [facility]  Reason For Study: , Chronic systolic heart failure (H)  Ordering Physician: DRISS SALTER  Referring Physician: DRISS SALTER  Performed By: PASHA Baum     BSA: 2.1 m2  Height: 69 in  Weight: 200 lb  BP: 105/70 mmHg  _____________________________________________________________________________  __        Procedure  Echocardiogram with two-dimensional, color and spectral Doppler performed.  Contrast Optison. Optison (NDC #6052-5643-86) given intravenously. Patient was  given 6.0 ml mixture of 3 ml Optison and 6 ml saline. 3.0 ml  wasted.  _____________________________________________________________________________  __        Interpretation Summary  Severe left ventricular dilation is present.  Severe diffuse hypokinesis is present.  The Ejection Fraction is estimated at 10-15%.  Mild to moderate right ventricular dilation is present.  Global right ventricular function is mildly reduced.  Severe right atrial enlargement is present.  Severe tricuspid insufficiency is present.  Pulmonary artery systolic pressure cannot be assessed.  Dilation of the inferior vena cava is present with abnormal respiratory  variation in diameter.  No pericardial effusion is present.  Worsened LV function and TR since lasr srudy.  _____________________________________________________________________________  __        Left Ventricle  Left ventricular wall thickness is normal. Severe left ventricular dilation is  present. Left ventricular diastolic function is indeterminate. The Ejection  Fraction is estimated at 10-15%. Severe diffuse hypokinesis is present.     Right Ventricle  Mild to moderate right ventricular dilation is present. Global right  ventricular function is mildly reduced. A pacemaker lead is noted in the right  ventricle.     Atria  Moderate left atrial enlargement is present. Severe right atrial enlargement  is present. The atrial septum is intact as assessed by color Doppler .     Mitral Valve  Mild mitral annular calcification is present. Mild mitral insufficiency is  present.        Aortic Valve  Mild aortic valve sclerosis is present. Mild aortic insufficiency is present.     Tricuspid Valve  Severe tricuspid insufficiency is present. Pulmonary artery systolic pressure  cannot be assessed.     Pulmonic Valve  The pulmonic valve is normal. Trace pulmonic insufficiency is present.     Vessels  The aorta root is normal. The pulmonary artery is normal. Dilation of the  inferior vena cava is present with abnormal respiratory variation in diameter.      Pericardium  No pericardial effusion is present.        Compared to Previous Study  Worsened LV function and TR since lasr srudy.  _____________________________________________________________________________  __  MMode/2D Measurements & Calculations     RVDd: 5.4 cm  IVSd: 0.84 cm  LVIDd: 6.7 cm  LVIDs: 6.6 cm  LVPWd: 0.90 cm  FS: 1.5 %  LV mass(C)d: 250.2 grams  LV mass(C)dI: 121.1 grams/m2  Ao root diam: 3.1 cm  LA dimension: 5.5 cm  asc Aorta Diam: 3.0 cm  LA/Ao: 1.7  LVOT diam: 2.1 cm  LVOT area: 3.5 cm2     EF(MOD-bp): 13.9 %  LA Volume (BP): 99.9 ml  RWT: 0.27  TAPSE: 1.5 cm        Doppler Measurements & Calculations  MV E max amadeo: 110.4 cm/sec  Ao V2 max: 203.5 cm/sec  Ao max P.5 mmHg  Ao V2 mean: 140.0 cm/sec  Ao mean P.7 mmHg  Ao V2 VTI: 32.5 cm  MONICA(I,D): 1.7 cm2  MONICA(V,D): 1.6 cm2  LV V1 max PG: 3.5 mmHg  LV V1 max: 93.2 cm/sec  LV V1 VTI: 15.8 cm     SV(LVOT): 55.5 ml  SI(LVOT): 26.9 ml/m2  TR max amadeo: 165.7 cm/sec  TR max P.2 mmHg  AV Amadeo Ratio (DI): 0.46  MONICA Index (cm2/m2): 0.83     _____________________________________________________________________________  __           Report approved by: Baron Villalba 10/17/2018 03:08 PM          Assessment and Plan:    In summary, Fabiano is a 63-year-old gentleman with nonischemic cardiomyopathy status post HeartMate 3 LVAD placement who appears hypervolemic today secondary to dietary indiscretion.  I have increased his Bumex to 1 mg in the morning and 0.5 mg in the evening in addition to increasing his spironolactone to 25 mg daily, and starting taking potassium 20 mEq daily, with plans to stop potassium if his recheck level is normal.    I reinforced the importance of following his sodium and fluid restrictions and reviewed that restaurant food is typically  high in sodium.  He agrees to monitor his sodium intake closer and will be preparing more home cooked meals now that he will be going home.    1.  Chronic systolic heart failure secondary  to nonischemic cardiomyopathy.  Stage D  NYHA Class II  ACEi/ARB: yes, continue losartan 25 mg daily  BB: Yes, metoprolol XL 12.5 mg daily.   aldosterone antagonist: yes, increase spironolactone 25 mg daily.  SCD prophylaxis: ICD  Fluid status: hypervolemic, increase Bumex as outlined above.      2.  S/P LVAD implant as destination therapy with plans to reevaluate for possible transplant candidacy in 3 months.  Anticoagulation: Warfarin with INR goal of 2-3.  ACC managing.  Antiplatelet: Aspirin 81 mg daily.  MAP: Controlled at 78.  LDH: Stable at 233.  VAD Interrogation today: VAD interrogation reviewed with VAD coordinator. Agree with findings.  Some PI events with an isolated speed drop down to 5300.  No power spikes or other findings suspicious of pump malfunction noted.     3.  Hypertension: Controlled.  Continue metoprolol XL and losartan.    4.  Marijuana abuse: Currently abstinent.  Continue periodic screening for transplant candidacy.    5.  NSVT: Controlled.  Device check from today pending.  Continue metoprolol XL.    6.  Follow-up: BMP in 1 week.  Start cardiac rehab once he returns home. Follow-up as scheduled in the end of January in the LVAD clinic.    40 minutes spent face to face with patient with >50% in counseling, education, and coordination of care      Kylie NARANJO CNP  Advanced Heart Failure/LVAD clinic

## 2019-01-15 NOTE — PATIENT INSTRUCTIONS
Medications:  1. Increase your Bumex to 1mg in the morning and 0.5mg in the afternoon  2. Increase your spironolactone to 25mg daily  3. Start taking potassium chloride 20mEq daily    Follow-up:  1. Please get a set of labs at the lab of your choice next week Wednesday 1/23. Call Asim to tell him the name of the lab.  2. Please make an appointment to see Dr. Watkins in 3 months (April 2019)    Instructions:  1.Try to avoid salty restaurant foods.  2. Keep up the good work!  3. You can start doing the weekly dressing changes in about a week. Make sure there is no drainage before you start the weekly dressing change.    Page the VAD Coordinator on call if you gain more than 3 lb in a day or 5 in a week. Please also page if you feel unwell or have alarms.     Great to see you in clinic today. To Page the VAD Coordinator on call, dial [phone] option #[phone] and ask to speak to the VAD coordinator on call.

## 2019-01-15 NOTE — PATIENT INSTRUCTIONS
It was a pleasure to see you in clinic today. Please do not hesitate to call with any questions or concerns.  We look forward to seeing you in clinic at your next device check on 3/22/19.    Bess Garcia RN  Electrophysiology Nurse Clinician  [facility]  During business hours call:  [phone]  After business hours please call: [phone]- select option #4 and ask for job code [phone].

## 2019-01-15 NOTE — LETTER
1/15/2019      RE: Danielito Chu  [address] [facility]  [place] [address]       Dear Colleague,    Thank you for the opportunity to participate in the care of your patient, Danielito Chu, at the [facility]. Please see a copy of my visit note below.    CVTS LVAD Followup Note  Mr. Chu is well known to our service.  He had minimally invasive HM3 implant on 12/5/18.  Postoperatively he has recovered well.  He is currently participating in therapies daily.  His appetite and energy are excellent.  Dressing changes are going well for him.  His weight is stable and he feels extremely good.      VSS  Up in bed, alert, conversant  Chest clear, RRR, incisions healed well, driveline clean/dry/intact    Plan  - OK to discharge to home from surgical standpoint  - Please call with questions    Andrei Silva  Cardiothoracic surgery  [phone]

## 2019-01-16 ENCOUNTER — DOCUMENTATION ONLY (OUTPATIENT)
Dept: CARDIOLOGY | Facility: CLINIC | Age: 64
End: 2019-01-16

## 2019-01-16 ENCOUNTER — CARE COORDINATION (OUTPATIENT)
Dept: CARDIOLOGY | Facility: CLINIC | Age: 64
End: 2019-01-16

## 2019-01-16 LAB
MDC_IDC_LEAD_IMPLANT_DT: NORMAL
MDC_IDC_LEAD_LOCATION: NORMAL
MDC_IDC_LEAD_LOCATION_DETAIL_1: NORMAL
MDC_IDC_LEAD_MFG: NORMAL
MDC_IDC_LEAD_MODEL: NORMAL
MDC_IDC_LEAD_POLARITY_TYPE: NORMAL
MDC_IDC_LEAD_SERIAL: NORMAL
MDC_IDC_MSMT_BATTERY_DTM: NORMAL
MDC_IDC_MSMT_BATTERY_REMAINING_LONGEVITY: 144 MO
MDC_IDC_MSMT_BATTERY_STATUS: NORMAL
MDC_IDC_MSMT_CAP_CHARGE_DTM: NORMAL
MDC_IDC_MSMT_CAP_CHARGE_ENERGY: 0 J
MDC_IDC_MSMT_CAP_CHARGE_TIME: 0 S
MDC_IDC_MSMT_CAP_CHARGE_TIME: 10.2
MDC_IDC_MSMT_CAP_CHARGE_TYPE: NORMAL
MDC_IDC_MSMT_CAP_CHARGE_TYPE: NORMAL
MDC_IDC_MSMT_LEADCHNL_RV_IMPEDANCE_VALUE: 397 OHM
MDC_IDC_MSMT_LEADCHNL_RV_PACING_THRESHOLD_AMPLITUDE: 1.4 V
MDC_IDC_MSMT_LEADCHNL_RV_PACING_THRESHOLD_PULSEWIDTH: 0.5 MS
MDC_IDC_MSMT_LEADCHNL_RV_SENSING_INTR_AMPL: 2.4 MV
MDC_IDC_PG_IMPLANT_DTM: NORMAL
MDC_IDC_PG_MFG: NORMAL
MDC_IDC_PG_MODEL: NORMAL
MDC_IDC_PG_SERIAL: NORMAL
MDC_IDC_PG_TYPE: NORMAL
MDC_IDC_SESS_CLINIC_NAME: NORMAL
MDC_IDC_SESS_DTM: NORMAL
MDC_IDC_SESS_TYPE: NORMAL
MDC_IDC_SET_BRADY_HYSTRATE: NORMAL
MDC_IDC_SET_BRADY_LOWRATE: 40 {BEATS}/MIN
MDC_IDC_SET_BRADY_MODE: NORMAL
MDC_IDC_SET_LEADCHNL_RV_PACING_AMPLITUDE: 3 V
MDC_IDC_SET_LEADCHNL_RV_PACING_ANODE_ELECTRODE_1: NORMAL
MDC_IDC_SET_LEADCHNL_RV_PACING_ANODE_LOCATION_1: NORMAL
MDC_IDC_SET_LEADCHNL_RV_PACING_CAPTURE_MODE: NORMAL
MDC_IDC_SET_LEADCHNL_RV_PACING_CATHODE_ELECTRODE_1: NORMAL
MDC_IDC_SET_LEADCHNL_RV_PACING_CATHODE_LOCATION_1: NORMAL
MDC_IDC_SET_LEADCHNL_RV_PACING_POLARITY: NORMAL
MDC_IDC_SET_LEADCHNL_RV_PACING_PULSEWIDTH: 0.5 MS
MDC_IDC_SET_LEADCHNL_RV_SENSING_ADAPTATION_MODE: NORMAL
MDC_IDC_SET_LEADCHNL_RV_SENSING_ANODE_ELECTRODE_1: NORMAL
MDC_IDC_SET_LEADCHNL_RV_SENSING_ANODE_LOCATION_1: NORMAL
MDC_IDC_SET_LEADCHNL_RV_SENSING_CATHODE_ELECTRODE_1: NORMAL
MDC_IDC_SET_LEADCHNL_RV_SENSING_CATHODE_LOCATION_1: NORMAL
MDC_IDC_SET_LEADCHNL_RV_SENSING_POLARITY: NORMAL
MDC_IDC_SET_LEADCHNL_RV_SENSING_SENSITIVITY: 0.6 MV
MDC_IDC_SET_ZONE_DETECTION_INTERVAL: 250 MS
MDC_IDC_SET_ZONE_DETECTION_INTERVAL: 300 MS
MDC_IDC_SET_ZONE_DETECTION_INTERVAL: 375 MS
MDC_IDC_SET_ZONE_TYPE: NORMAL
MDC_IDC_SET_ZONE_VENDOR_TYPE: NORMAL
MDC_IDC_STAT_EPISODE_RECENT_COUNT: 0
MDC_IDC_STAT_EPISODE_RECENT_COUNT: 0
MDC_IDC_STAT_EPISODE_RECENT_COUNT: 1
MDC_IDC_STAT_EPISODE_RECENT_COUNT_DTM_END: NORMAL
MDC_IDC_STAT_EPISODE_RECENT_COUNT_DTM_START: NORMAL
MDC_IDC_STAT_EPISODE_TOTAL_COUNT: 0
MDC_IDC_STAT_EPISODE_TOTAL_COUNT: 0
MDC_IDC_STAT_EPISODE_TOTAL_COUNT: 286
MDC_IDC_STAT_EPISODE_TOTAL_COUNT_DTM_END: NORMAL
MDC_IDC_STAT_EPISODE_TYPE: NORMAL
MDC_IDC_STAT_EPISODE_VENDOR_TYPE: NORMAL
MDC_IDC_STAT_TACHYTHERAPY_ATP_DELIVERED_RECENT: 0
MDC_IDC_STAT_TACHYTHERAPY_ATP_DELIVERED_TOTAL: 0
MDC_IDC_STAT_TACHYTHERAPY_RECENT_DTM_END: NORMAL
MDC_IDC_STAT_TACHYTHERAPY_RECENT_DTM_START: NORMAL
MDC_IDC_STAT_TACHYTHERAPY_SHOCKS_ABORTED_RECENT: 0
MDC_IDC_STAT_TACHYTHERAPY_SHOCKS_ABORTED_TOTAL: 0
MDC_IDC_STAT_TACHYTHERAPY_SHOCKS_DELIVERED_RECENT: 0
MDC_IDC_STAT_TACHYTHERAPY_SHOCKS_DELIVERED_TOTAL: 0
MDC_IDC_STAT_TACHYTHERAPY_TOTAL_DTM_END: NORMAL

## 2019-01-16 NOTE — PROGRESS NOTES
"Saw patient/caregiver to check in 4 weeks post discharge. Pt reports VAD parameters normal and weight stable. Reviewed medications and answered any questions. Patient reports sleeping \"great\" and no anxiety since being home with LVAD. Patient is fully able to move around the house and care for him/herself independently.    Discussed specific new problems/stressors since being discharged from the hospital: \"I haven't been eating right living in the apartment. I will eat better once I go home\". Empathized with patient and reviewed coping strategies: enlisting support from friends and love ones, attending patient and caregiver support groups, reviewing LVAD educational materials to reinforce knowledge, and talking about concerns with family/care providers/trusted others. Encouraged pt to page VAD Coordinator with any issues or questions. Pt verbalizes understanding.    "

## 2019-01-16 NOTE — PROGRESS NOTES
CVTS LVAD Followup Note  Mr. Chu is well known to our service.  He had minimally invasive HM3 implant on 12/5/18.  Postoperatively he has recovered well.  He is currently participating in therapies daily.  His appetite and energy are excellent.  Dressing changes are going well for him.  His weight is stable and he feels extremely good.      VSS  Up in bed, alert, conversant  Chest clear, RRR, incisions healed well, driveline clean/dry/intact    Plan  - OK to discharge to home from surgical standpoint  - Please call with questions    Andrei Silva  Cardiothoracic surgery  [phone]

## 2019-01-21 ENCOUNTER — TELEPHONE (OUTPATIENT)
Dept: CARDIOLOGY | Facility: CLINIC | Age: 64
End: 2019-01-21

## 2019-01-21 NOTE — TELEPHONE ENCOUNTER
Health Call Center    Phone Message    May a detailed message be left on voicemail: yes    Reason for Call: Form or Letter   Type or form/letter needing completion: long term disability benefit form  Provider: Dr Watkins  Date form needed: asap  Once completed: Fax form to: number provider on form   Said he left this form last time he was in clinic and [employer] has not received it back yet - Pt wants to confirm you have this form and that Dr Watkins filled it out and faxed it back - Please return Pt call to discuss - Thanks      Action Taken: Message routed to:  [facility] ([facility] Cardiology Clinic

## 2019-01-21 NOTE — PROGRESS NOTES
Rheumatology New patient Note    Reason for referral: Erosive crystal proven gout, f/u hospitalization    Referring physician: Bryan Orellana      DOS: 2018       HPI:    Danielito Chu is a 63 year old male. He is here for initial visit, f/u hospitalization and to establish care for his gout. He lives far away and has to coordinate [facility] visits to the [facility].    He was initially seen for gout consult on 2018, L knee was injected with steroid and lidocaine and he received anakinra for gout mx. He was started on allopurinol and colcrys. Tolertaes meds well.Had another ER visit on  when he received one dose of prednisone 40 mg every day for gout flare of R foot.    Today, is feeling well, has mild residual tenderness of the R foot.      Past Medical History:   Diagnosis Date     Acute systolic congestive heart failure (H) 2017     Diabetes (H)      HTN (hypertension)      Hyperlipidemia      Liver disease      LVAD (left ventricular assist device) present (H)      3      Marijuana abuse      Mitral regurgitation      Nocturnal oxygen desaturation 3/29/2018     Nonischemic cardiomyopathy (H) 2017     SHIRLEY (obstructive sleep apnea)      Pacemaker 2017    ICD 17     Past Surgical History:   Procedure Laterality Date     ESOPHAGOSCOPY, GASTROSCOPY, DUODENOSCOPY (EGD), COMBINED N/A 2018    Procedure: COMBINED ESOPHAGOSCOPY, GASTROSCOPY, DUODENOSCOPY (EGD);  Surgeon: Candido Mendez MD;  Location:  GI     IMPLANT IMPLANTABLE CARDIOVERTER DEFIBRILLATOR       INSERT VENTRICULAR ASSIST DEVICE LEFT (HEARTMATE II/III) MINIMALLY INVASIVE N/A 2018    Procedure: Partial Median Sternotomy, Left Thoracotomy, Minimally Invasive Left Ventricular Assist Device Placement (Heartmate III), On Cardiopulmonary Bypass;  Surgeon: Andrei Silva MD;  Location:  OR     Family History   Problem Relation Age of Onset     Heart Failure Father          at age 86     Heart  Failure Paternal Uncle          at 66      Myocardial Infarction Paternal Uncle          at age 62     Coronary Artery Disease Mother          during CABG at age 41     Social History     Socioeconomic History     Marital status: Single     Spouse name: Not on file     Number of children: Not on file     Years of education: Not on file     Highest education level: Not on file   Social Needs     Financial resource strain: Not on file     Food insecurity - worry: Not on file     Food insecurity - inability: Not on file     Transportation needs - medical: Not on file     Transportation needs - non-medical: Not on file   Occupational History     Not on file   Tobacco Use     Smoking status: Former Smoker     Smokeless tobacco: Never Used     Tobacco comment: quit 3/2017   Substance and Sexual Activity     Alcohol use: No     Frequency: Never     Comment: social     Drug use: No     Sexual activity: Not on file   Other Topics Concern     Parent/sibling w/ CABG, MI or angioplasty before 65F 55M? Not Asked   Social History Narrative     Not on file     Patient Active Problem List   Diagnosis     CHF (congestive heart failure) (H)     Acute systolic congestive heart failure (H)     Nonischemic cardiomyopathy (H)     Cardiac defibrillator, [employer], Single Chamber- NOT dependent     Nocturnal oxygen desaturation     Acute on chronic systolic CHF (congestive heart failure) (H)     Heart failure with acute decompensation, type unknown (H)     Acute on chronic systolic heart failure (H)     Heart failure (H)     LVAD (left ventricular assist device) present (H)     No Known Allergies    Outpatient Encounter Medications as of 2018   Medication Sig Dispense Refill     allopurinol (ZYLOPRIM) 100 MG tablet Take 2 tablets (200 mg) by mouth daily 60 tablet 5     colchicine (COLCYRS) 0.6 MG tablet Take 1 tablet (0.6 mg) by mouth 2 times daily 60 tablet 3     [DISCONTINUED] predniSONE (DELTASONE) 20 MG tablet  Take 40 mg by mouth once as needed. (Patient not taking: Reported on 1/10/2019.) 5 tablet 0     [] acetaminophen (TYLENOL) 325 MG tablet Take 2 tablets (650 mg) by mouth every 6 hours as needed for pain 100 tablet 0     artificial saliva (BIOTENE DRY MOUTHWASH) LIQD liquid Swish and spit 10 mLs in mouth 4 times daily as needed for dry mouth 59 mL 0     aspirin (ASA) 81 MG chewable tablet Take 1 tablet (81 mg) by mouth daily 120 tablet 0     Blood Glucose Monitoring Suppl (BLOOD GLUCOSE MONITOR SYSTEM) w/Device KIT three times a week       [] hydrocortisone (CORTAID) 1 % external cream Apply topically 2 times daily as needed for itching 1 g 0     [] melatonin 3 MG tablet Take 1 tablet (3 mg) by mouth At Bedtime 30 tablet 0     [] nystatin (MYCOSTATIN) 407559 UNIT/ML suspension Take 5 mLs (500,000 Units) by mouth 4 times daily for 9 days 180 mL 0     omeprazole (PRILOSEC) 20 MG DR capsule Take 1 capsule (20 mg) by mouth every morning (before breakfast) 90 capsule 3     [] senna-docusate (SENOKOT-S/PERICOLACE) 8.6-50 MG tablet Take 2 tablets by mouth daily as needed for constipation 60 tablet 0     traZODone (DESYREL) 100 MG tablet Take 1 tablet (100 mg) by mouth nightly as needed for sleep 30 tablet 0     vitamin B1 (THIAMINE) 100 MG tablet Take 1 tablet (100 mg) by mouth daily 30 tablet 0     warfarin (COUMADIN) 2 MG tablet Take 3 mg PO daily at 6 pm until next INR check, then dose per INR goal 2-3 150 tablet 1     [DISCONTINUED] allopurinol (ZYLOPRIM) 100 MG tablet Take 2 tablets (200 mg) by mouth daily 90 tablet 0     [DISCONTINUED] bumetanide (BUMEX) 1 MG tablet Take 1 tablet (1 mg) by mouth 2 times daily 60 tablet 0     [DISCONTINUED] colchicine (COLCYRS) 0.6 MG tablet Take 1 tablet (0.6 mg) by mouth daily 60 tablet 0     [DISCONTINUED] losartan (COZAAR) 25 MG tablet Take 1 tablet (25 mg) by mouth daily 30 tablet 0     [DISCONTINUED] potassium chloride ER (K-DUR/KLOR-CON M) 20  MEQ CR tablet Take 1 tablet (20 mEq) by mouth 2 times daily 90 tablet 0     No facility-administered encounter medications on file as of 12/26/2018.            ROS:  A comprehensive ROS was done, positives are per HPI.        His records were reviewed.    Results for orders placed or performed in visit on 12/22/18   INR   Result Value Ref Range    INR 2.61 (H) 0.86 - 1.14   Lactate Dehydrogenase   Result Value Ref Range    Lactate Dehydrogenase 276 (H) 85 - 227 U/L   CBC with platelets   Result Value Ref Range    WBC 14.3 (H) 4.0 - 11.0 10e9/L    RBC Count 3.79 (L) 4.4 - 5.9 10e12/L    Hemoglobin 11.0 (L) 13.3 - 17.7 g/dL    Hematocrit 36.8 (L) 40.0 - 53.0 %    MCV 97 78 - 100 fl    MCH 29.0 26.5 - 33.0 pg    MCHC 29.9 (L) 31.5 - 36.5 g/dL    RDW 19.2 (H) 10.0 - 15.0 %    Platelet Count 673 (H) 150 - 450 10e9/L   Comprehensive metabolic panel   Result Value Ref Range    Sodium 135 133 - 144 mmol/L    Potassium 3.7 3.4 - 5.3 mmol/L    Chloride 100 94 - 109 mmol/L    Carbon Dioxide 29 20 - 32 mmol/L    Anion Gap 6 3 - 14 mmol/L    Glucose 117 (H) 70 - 99 mg/dL    Urea Nitrogen 15 7 - 30 mg/dL    Creatinine 0.76 0.66 - 1.25 mg/dL    GFR Estimate >90 >60 mL/min/[1.73_m2]    GFR Estimate If Black >90 >60 mL/min/[1.73_m2]    Calcium 8.4 (L) 8.5 - 10.1 mg/dL    Bilirubin Total 0.9 0.2 - 1.3 mg/dL    Albumin 2.7 (L) 3.4 - 5.0 g/dL    Protein Total 7.6 6.8 - 8.8 g/dL    Alkaline Phosphatase 166 (H) 40 - 150 U/L    ALT 50 0 - 70 U/L    AST 65 (H) 0 - 45 U/L   Uric acid   Result Value Ref Range    Uric Acid 3.8 3.5 - 7.2 mg/dL     Component      Latest Ref Rng & Units 10/24/2018 11/13/2018 12/22/2018   Uric Acid      3.5 - 7.2 mg/dL 12.9 (H) 12.0 (H) 3.8     Exam: XR FOOT PORT BILATERAL 3 VW, 12/13/2018 3:51 PM     Indication: eval for evidence of gouty erosions     Comparison: None     Findings:   Right: No acute osseous abnormality. Moderate degenerative changes of  the first metatarsophalangeal joint with adjacent  nonspecific  mineralization. Possible erosions seen on the lateral view of the  distal first metatarsal. Vascular calcification. Diffuse soft tissue  swelling/stranding.     Left: No acute osseous thoracic. Small erosion at the first  metatarsophalangeal joint. Mild scattered degenerative  change.  Nonspecific cortical irregularity at the base of the fifth metatarsal  may be from chronic trauma or less likely erosion. Diffuse soft tissue  swelling/stranding.                                                                      Impression: Small erosion of the left first metatarsophalangeal joint  with possible erosion of the first metatarsophalangeal joint on the  right.     SHAY GONZALEZ MD        Ph.E:    Pulse 64   Temp 97.4  F (36.3  C) (Oral)   Wt 82.1 kg (181 lb)   SpO2 98%   BMI 25.97 kg/m        Constitutional: WD/WN. Pleasant. In no acute distress.  Eyes: EOM intact, PERRLA, sclera anicteric, conj not injected  HEENT: No oral ulcers or thrush.   Neck: No cervical LAP or thyromegaly  Chest: Clear to auscultation bilaterally  CV: RRR, no murmurs/ rubs or gallops. No edema, clubbing or cyanosis.   GI: Abdomen is soft and non tender.   MS: No synovitis. Cool joints. Mild MTP tenderness.No tophi.  Skin: No skin rash  Neuro: A&O x 3. Grossly non focal  Psych: NL affect and mood    Assessment/ plan:    Crystal proven erosive gout: 62 yo M with history of DM2, CHF s/p LVAD (12/5/18), crystal proven gout (based on L knee artherocentesis 12/13/2018) with evidence of mild erosions of 1st metatarsals on foot xray. He was initially seen for gout consult on 12/13/2018, L knee was injected with steroid and lidocaine and he received anakinra 100 mg every day x 5 days for gout mx. He was started on allopurinol 200 mg qd and colcrys 0.6 mg qd. Tolertaes meds well.Had another ER visit on 12/21 when he received one dose of prednisone 40 mg every day for gout flare of R foot.    Today, is feeling well, has mild residual  tenderness of the R foot.     DIAGNOSIS:  1. Crystal proven gouty arthritis (affected joints include bilateral MTPs, right midfoot and ankle, left knee), recovering from another flare which started on 12/21.       Plan:    Will check serum uric acid, goal is to keep it below 6. Last SUA on 12/22 was 3.8 but it could be falsely down during the flare.    Continue allopurinol 200 mg a day, increase colcrys from 0.6 mg every day to twice a day    Allopurinol and colcrys monitoring labs q6mo, recent labs were reviewed.    Recomemnd prednisone 40 mg a day x once if pain becomes not tolerable and call me    F/U Jan 16 at 1:15 am add on

## 2019-01-22 NOTE — TELEPHONE ENCOUNTER
Health Call Center    Phone Message    May a detailed message be left on voicemail: yes    Reason for Call: Other: Pt looking for a status update on his forms. He said it is supposed to be taken care of by Kylie or Minoo, not Dr. Watkins. Please give patient a call back with an update. Thank you.     Action Taken: Message routed to:  [facility] Cardiology

## 2019-01-23 ENCOUNTER — CARE COORDINATION (OUTPATIENT)
Dept: CARDIOLOGY | Facility: CLINIC | Age: 64
End: 2019-01-23

## 2019-01-23 ENCOUNTER — ANTICOAGULATION THERAPY VISIT (OUTPATIENT)
Dept: ANTICOAGULATION | Facility: CLINIC | Age: 64
End: 2019-01-23

## 2019-01-23 DIAGNOSIS — Z95.811 LVAD (LEFT VENTRICULAR ASSIST DEVICE) PRESENT (H): ICD-10-CM

## 2019-01-23 LAB
ANION GAP SERPL CALCULATED.3IONS-SCNC: 15 MMOL/L
BASOPHILS NFR BLD AUTO: 0.2 %
BUN SERPL-MCNC: 13 MG/DL
CALCIUM SERPL-MCNC: 9.5 MG/DL
CHLORIDE SERPLBLD-SCNC: 99 MMOL/L
CO2 SERPL-SCNC: 26 MMOL/L
CREAT SERPL-MCNC: 0.7 MG/DL
EOSINOPHIL NFR BLD AUTO: 0 %
ERYTHROCYTE [DISTWIDTH] IN BLOOD BY AUTOMATED COUNT: 17 %
GFR SERPL CREATININE-BSD FRML MDRD: >90 ML/MIN/1.73M2
GLUCOSE SERPL-MCNC: 208 MG/DL (ref 70–99)
HCT VFR BLD AUTO: 39.5 %
HEMOGLOBIN: 12.1 G/DL (ref 13.3–17.7)
INR PPP: 3.4
LYMPHOCYTES NFR BLD AUTO: 10.3 %
MCH RBC QN AUTO: 27 PG
MCHC RBC AUTO-ENTMCNC: 30.6 G/DL
MCV RBC AUTO: 88.2 FL
MONOCYTES NFR BLD AUTO: 5.7 %
NEUTROPHILS NFR BLD AUTO: 83.8 %
PLATELET COUNT - QUEST: 411 10^9/L (ref 150–450)
POTASSIUM SERPL-SCNC: 4.1 MMOL/L
RBC # BLD AUTO: 4.48 10^12/L
SODIUM SERPL-SCNC: 136 MMOL/L
WBC # BLD AUTO: 9.5 10^9/L

## 2019-01-23 NOTE — PROGRESS NOTES
"ANTICOAGULATION FOLLOW-UP CLINIC VISIT    Patient Name:  Danielito Chu  Date:  1/23/2019  Contact Type:  Telephone    SUBJECTIVE:     Patient Findings     Comments:   Patient apparently suffers from gout and yesterday took 2 \"pills\" of prednisone.  Fani is unsure of strength.  Patient apparently uses prednisone prn.            OBJECTIVE    INR   Date Value Ref Range Status   01/23/2019 3.4  Final     Factor 2 Assay   Date Value Ref Range Status   10/27/2018 70 60 - 140 % Final     Comment:     The Factor 2 activity level is not a screening test for the Prothrombin 11342   mutation.         ASSESSMENT / PLAN  No question data found.  Anticoagulation Summary  As of 1/23/2019    INR goal:   2.0-3.0   TTR:   62.1 % (1.1 mo)   INR used for dosing:   3.4! (1/23/2019)   Warfarin maintenance plan:   No maintenance plan   Full warfarin instructions:   1/23: 2 mg; 1/24: 3 mg; 1/25: 3 mg; 1/26: 2 mg; 1/27: 3 mg   Plan last modified:   Kristie Fernandez RN (12/20/2018)   Next INR check:   1/28/2019   Priority:   INR   Target end date:   Indefinite    Indications    LVAD (left ventricular assist device) present (H) [Z95.811]             Anticoagulation Episode Summary     INR check location:       Preferred lab:       Send INR reminders to:   [facility]    Comments:   LVAD placed 12/5/18 ASA 81mg Daily Has Jantoven 2mg tablets   Please speak with sister Fani Ph [phone]  Pt will be staying at the [facility] until he is stable to go home.      Anticoagulation Care Providers     Provider Role Specialty Phone number    Lorena Watkins MD [facility] Cardiology [phone]            See the Encounter Report to view Anticoagulation Flowsheet and Dosing Calendar (Go to Encounters tab in chart review, and find the Anticoagulation Therapy Visit)    Spoke with Fani.     Bri Becerril RN                 "

## 2019-01-23 NOTE — TELEPHONE ENCOUNTER
" Health Call Center    Phone Message    May a detailed message be left on voicemail: yes    Reason for Call: Other: 3RD REQUEST, PLEASE HELP PT: Pt is needing Dr. Watkins's team to help get the paperwork filled out for [employer]. Pt was just released after a 30 day stay, got LVADS put in, the paperwork is called \"[employer] Report Cardiac\", a two-page document. Clinic should have this paperwork, but just in case, pt is re-faxing it this afternoon. Pt received an email from the disability insurance company and they have not recevied the paperwork from Dr. Watkins, yet. Please call pt to let him know the status of this paperwork for his disability company. Thank you!     Action Taken: Message routed to:  [facility] ([facility]):  Cardiology Clinic  "

## 2019-01-23 NOTE — PROGRESS NOTES
"Called patient/caregiver to check in week 6 post discharge. Pt reports VAD parameters normal and weight \"up two pounds - I may need to increase my diuretic\". Reviewed medications and answered any questions. Patient reports sleeping well and no anxiety since being home with LVAD. Patient is fully able to move around the house and care for him/herself independently.     Discussed specific new problems/stressors since being discharged from the hospital: gout recurrences. I advised Fabiano to see his PCP to check on treatment options for gout. Empathized with patient and reviewed coping strategies: enlisting support from friends and love ones, attending patient and caregiver support groups, reviewing LVAD educational materials to reinforce knowledge, and talking about concerns with family/care providers/trusted others. Encouraged pt to page VAD Coordinator with any issues or questions. Pt verbalizes understanding.      "

## 2019-01-24 ENCOUNTER — CARE COORDINATION (OUTPATIENT)
Dept: CARDIOLOGY | Facility: CLINIC | Age: 64
End: 2019-01-24

## 2019-01-24 DIAGNOSIS — Z95.811 LVAD (LEFT VENTRICULAR ASSIST DEVICE) PRESENT (H): ICD-10-CM

## 2019-01-24 RX ORDER — SPIRONOLACTONE 25 MG/1
37.5 TABLET ORAL DAILY
Qty: 145 TABLET | Refills: 3 | Status: SHIPPED | OUTPATIENT
Start: 2019-01-24 | End: 2020-01-01

## 2019-01-24 RX ORDER — BUMETANIDE 1 MG/1
1 TABLET ORAL DAILY
Qty: 60 TABLET | Refills: 3 | Status: SHIPPED | OUTPATIENT
Start: 2019-01-24 | End: 2019-01-24

## 2019-01-24 RX ORDER — BUMETANIDE 1 MG/1
1 TABLET ORAL 2 TIMES DAILY
Qty: 60 TABLET | Refills: 3 | Status: SHIPPED | OUTPATIENT
Start: 2019-01-24 | End: 2019-02-13

## 2019-01-24 NOTE — PROGRESS NOTES
D: Fabiano got his labs drawn 1/23 and he thinks he is fluid up a bit with his weight up about 2 lbs. His K is 4.1, creat 0.70, hgb 12.1.   I: Kylie the NP who saw him in clinic last week ordered increasing bumex to 1mg BID, increasing spironolactone to 37.5mg daily, and keeping KCl the same. We will get a set of labs on 1/29 here in the [facility] before Fabiano's appointment  P: If K level is OK next week, Kylie plans to discontinue the KCl

## 2019-01-28 ENCOUNTER — ANTICOAGULATION THERAPY VISIT (OUTPATIENT)
Dept: ANTICOAGULATION | Facility: CLINIC | Age: 64
End: 2019-01-28

## 2019-01-28 DIAGNOSIS — Z95.811 LVAD (LEFT VENTRICULAR ASSIST DEVICE) PRESENT (H): ICD-10-CM

## 2019-01-28 LAB — INR PPP: 3

## 2019-01-28 NOTE — PROGRESS NOTES
ANTICOAGULATION FOLLOW-UP CLINIC VISIT    Patient Name:  Danielito Chu  Date:  1/28/2019  Contact Type:  Telephone    SUBJECTIVE:     Patient Findings     Positives:   No Problem Findings           OBJECTIVE    INR   Date Value Ref Range Status   01/28/2019 3.0  Final     Factor 2 Assay   Date Value Ref Range Status   10/27/2018 70 60 - 140 % Final     Comment:     The Factor 2 activity level is not a screening test for the Prothrombin 78574   mutation.         ASSESSMENT / PLAN  INR assessment THER    Recheck INR In: 4 DAYS    INR Location Outside lab      Anticoagulation Summary  As of 1/28/2019    INR goal:   2.0-3.0   TTR:   53.7 % (1.2 mo)   INR used for dosing:   3.0 (1/28/2019)   Warfarin maintenance plan:   No maintenance plan   Full warfarin instructions:   1/28: 3 mg; 1/29: 3 mg; 1/30: 2 mg; 1/31: 3 mg; 2/1: 3 mg   Plan last modified:   Kristie Fernandez RN (12/20/2018)   Next INR check:   2/1/2019   Priority:   INR   Target end date:   Indefinite    Indications    LVAD (left ventricular assist device) present (H) [Z95.811]             Anticoagulation Episode Summary     INR check location:       Preferred lab:       Send INR reminders to:   [facility]    Comments:   LVAD placed 12/5/18 ASA 81mg Daily Has Jantoven 2mg tablets   Please speak with sister Fani  [phone]  Pt will be staying at the [facility] until he is stable to go home.      Anticoagulation Care Providers     Provider Role Specialty Phone number    Lorena Watkins MD [facility] Cardiology [phone]            See the Encounter Report to view Anticoagulation Flowsheet and Dosing Calendar (Go to Encounters tab in chart review, and find the Anticoagulation Therapy Visit)  Spoke with patient.  Patient had LVAD placed on:   12/5/2018  Patient's current Aspirin dose: 81mg  LVAD Protocol followed: yes  Lorena Kamara RN

## 2019-01-29 ENCOUNTER — DOCUMENTATION ONLY (OUTPATIENT)
Dept: CARDIOLOGY | Facility: CLINIC | Age: 64
End: 2019-01-29

## 2019-01-30 ENCOUNTER — DOCUMENTATION ONLY (OUTPATIENT)
Dept: CARDIOLOGY | Facility: CLINIC | Age: 64
End: 2019-01-30

## 2019-01-30 ENCOUNTER — CARE COORDINATION (OUTPATIENT)
Dept: CARDIOLOGY | Facility: CLINIC | Age: 64
End: 2019-01-30

## 2019-01-30 NOTE — PROGRESS NOTES
"I called Fabiano to check in and to ask him to get a set of labs in [facility]. He had to cancel his appointment here yesterday due to weather. Fabiano said he will get labs on Friday, 2/1 in [place]. Fabiano said he has a lot of worries about long term disability and his health deteriorating. He said in the past after he was tuned up in the hospital and his EF improved, he had to go back to work and then his health got worse again. I reassured him that now that he has an LVAD, his heart failure is under even better control. Fabiano wants to see a health psychologist to talk about his worries.    I told him that he could see someone here [place] [facility] but that would involve making the trip from [place]. I reminded him that we wanted him to establish care with his new PCP in [place]. This physician could then set him up with a health psychologist locally. Fabiano said he didn't really want to involve other intermediaries--he just wants to talk to one person to \"get this in the medical record\". I reassured him that all of his care received is part of his medical record and he should involve his PCP. Fabiano said he would call his [place] PCP to get an appointment.  "

## 2019-01-31 ENCOUNTER — TELEPHONE (OUTPATIENT)
Dept: RHEUMATOLOGY | Facility: CLINIC | Age: 64
End: 2019-01-31

## 2019-01-31 DIAGNOSIS — I50.23 ACUTE ON CHRONIC SYSTOLIC HEART FAILURE (H): ICD-10-CM

## 2019-01-31 RX ORDER — ALLOPURINOL 100 MG/1
200 TABLET ORAL DAILY
Qty: 60 TABLET | Refills: 1 | Status: SHIPPED | OUTPATIENT
Start: 2019-01-31 | End: 2019-04-01

## 2019-01-31 NOTE — TELEPHONE ENCOUNTER
[facility]    Phone Message    May a detailed message be left on voicemail: yes    Reason for Call: Medication Question or concern regarding medication   Prescription Clarification  Name of Medication: colchicine (COLCYRS) 0.6 MG tablet  Prescribing Provider: Dr. Nassar   Pharmacy: [facility]ARM[facility]Y #[id number] [address]   What on the order needs clarification? Pt states he was just informed by his insurance company that they will not cover this med as it is too expensive. He is wanting to know if an alternate can be prescribed. Please advise.    Action Taken: Message routed to:  [facility] ([facility]): Rheum

## 2019-01-31 NOTE — TELEPHONE ENCOUNTER
Called and spoke with pt, he has 2 days left of colchincine and then he will be out.  Not sure if this needs a PA or needs to be a different brand.  Will follow up with insurance company tomorrow as formulary may have changed.    Davina Spencer RN  Rheumatology Clinic

## 2019-02-01 ENCOUNTER — CARE COORDINATION (OUTPATIENT)
Dept: CARDIOLOGY | Facility: CLINIC | Age: 64
End: 2019-02-01

## 2019-02-01 ENCOUNTER — ANTICOAGULATION THERAPY VISIT (OUTPATIENT)
Dept: ANTICOAGULATION | Facility: CLINIC | Age: 64
End: 2019-02-01

## 2019-02-01 DIAGNOSIS — Z95.811 LVAD (LEFT VENTRICULAR ASSIST DEVICE) PRESENT (H): ICD-10-CM

## 2019-02-01 LAB
ANION GAP SERPL CALCULATED.3IONS-SCNC: 12 MMOL/L
BUN SERPL-MCNC: 17 MG/DL
CALCIUM SERPL-MCNC: 9.2 MG/DL
CHLORIDE SERPLBLD-SCNC: 97 MMOL/L
CO2 SERPL-SCNC: 29 MMOL/L
CREAT SERPL-MCNC: 0.8 MG/DL
GFR SERPL CREATININE-BSD FRML MDRD: >90 ML/MIN/1.73M2
GLUCOSE SERPL-MCNC: 107 MG/DL (ref 70–99)
INR PPP: 2.9
LDH SERPL-CCNC: 489 U/L
POTASSIUM SERPL-SCNC: 4.4 MMOL/L
SODIUM SERPL-SCNC: 134 MMOL/L

## 2019-02-01 NOTE — TELEPHONE ENCOUNTER
Left message letting pt know what the problem is, see Luma's note below.  Provided number for him to return call if he needs to.    Davina Spencer RN  Rheumatology Clinic

## 2019-02-01 NOTE — PROGRESS NOTES
ANTICOAGULATION FOLLOW-UP CLINIC VISIT    Patient Name:  Danielito Chu  Date:  2019  Contact Type:  Telephone    SUBJECTIVE:        OBJECTIVE    INR   Date Value Ref Range Status   2019 2.9  Final     Factor 2 Assay   Date Value Ref Range Status   10/27/2018 70 60 - 140 % Final     Comment:     The Factor 2 activity level is not a screening test for the Prothrombin 80542   mutation.         ASSESSMENT / PLAN  INR assessment THER    Recheck INR In: 3 DAYS    INR Location Clinic      Anticoagulation Summary  As of 2019    INR goal:   2.0-3.0   TTR:   58.2 % (1.4 mo)   INR used for dosin.9 (2019)   Warfarin maintenance plan:   No maintenance plan   Full warfarin instructions:   2: 2 mg; 22: 3 mg; 23: 3 mg   Plan last modified:   Kristie Fernandez, RN (2018)   Next INR check:   2019   Priority:   INR   Target end date:   Indefinite    Indications    LVAD (left ventricular assist device) present (H) [Z95.811]             Anticoagulation Episode Summary     INR check location:       Preferred lab:       Send INR reminders to:   [facility]    Comments:   LVAD placed 18 ASA 81mg Daily Has Jantoven 2mg tablets   Please speak with sister Fani  [phone]  Pt is home .      Anticoagulation Care Providers     Provider Role Specialty Phone number    Lorena Watkins MD [facility] Cardiology [phone]            See the Encounter Report to view Anticoagulation Flowsheet and Dosing Calendar (Go to Encounters tab in chart review, and find the Anticoagulation Therapy Visit)    Left message for patient's sister  with results and dosing recommendations. Asked patient to call back to report any missed doses, falls, signs and symptoms of bleeding or clotting, any changes in health, medication, or diet. Asked patient to call back with any questions or concerns.      Giovanni Ordonez [facility]

## 2019-02-01 NOTE — TELEPHONE ENCOUNTER
Called and spoke with pharmacy to clarify - as pharmacy liaison Doretha SMITH could get this to go through for $0 without PA required.    [facility] has been running for generic. Requested they fill brand. Re-submitted without issue. Spoke with Theodora.    They only have a few tablets in stock - can fill partial for him. Will place on order.

## 2019-02-01 NOTE — PROGRESS NOTES
D: Pt called to ask if he could use the bike at cardiac rehab.   I: Pt encouraged to monitor his driveline site closely. If he notices drainage after using the bike it is likely his body habitus does not allow this activity. Otherwise, he is feel to ride bike; this is a great exercise for him.   A: Pt verbalized understanding.   P: Will continue with cardiac rehab as tolerated. 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

## 2019-02-04 NOTE — PROGRESS NOTES
Addendum 2/4/19   I left a  message for the  patient to get their labs done as soon as possible and call the Anticoagulation Clinic.  We received no INR results today. Recommended take 3mgs of warfarin today.  Chandra Ordonez [facility]    Addendum 2/5/19  Spoke with Fani. The are not able to get labs today. The will go to [facility] tomorrow.  Chandra Ordonez [facility]

## 2019-02-05 ENCOUNTER — DOCUMENTATION ONLY (OUTPATIENT)
Dept: CARDIOLOGY | Facility: CLINIC | Age: 64
End: 2019-02-05

## 2019-02-05 ENCOUNTER — ANTICOAGULATION THERAPY VISIT (OUTPATIENT)
Dept: ANTICOAGULATION | Facility: CLINIC | Age: 64
End: 2019-02-05

## 2019-02-05 DIAGNOSIS — Z95.811 LVAD (LEFT VENTRICULAR ASSIST DEVICE) PRESENT (H): ICD-10-CM

## 2019-02-05 NOTE — PROGRESS NOTES
Fabiano called to find out when he should get his INR drawn since he did not get it yesterday due to cancelling his clinic appointment due to the weather. I said since he missed it yesterday he should get it drawn today. He did not seem to know when he should get it drawn again. The [facility] clinic recommended he get it drawn 2/4. We agreed on him going to his local lab 2/6. I emphasized with Fabiano that he should get his INR drawn on the date that the [facility] clinic recommends. He asked that I change their records to show that they should call him on his self phone instead of his sister Fani's cell phone number.

## 2019-02-05 NOTE — LETTER
Patient Name: Danielito Chu     : 1955     Diagnosis/ICD-10: Heart Failure, unspecified I50.9; LVAD 295.811   Requesting Physician: Dr. Lorena Watkins   Date of Request: 19     Date to Draw 2019     **Please fax results to Edgardo Elias VAD Coord at [phone].                Call Asim with Questions      ORDER CODE TESTS TAMEKA.VOL.   X CBASIC Na, K, Cl, CO2, Crea, BUN, Glu, Ca GG 0.5-1    BHEPAT Alb, AlkP, ALT, AST, BBil, TP GG 0.5-1    BLIP Chol, Trig, HDL, LDL GG 1-2    CCOMP Na, K, Cl, CO2, Crea, BUN, Glu, Ca, Alb, AlkP, ALT, AST, Bbil, TP,  GG 0.6-1   X HGP CBC & Platelet P 0.3-1    HGDP CBC, Differential & Platelet P 0.3-1    PLT Platelet  P 0.3-1   X INR INR B 2.7    PTT PTT B 2.7   X LD Lactate Dehydrogenase GG 0.3-1    PHGB Plasma Hemoglobin GG 2-3    Pre-Albumin Pre-Albumin RG 1.0                  Signed,      Lorena Watkins MD  Heart Failure, Mechanical Circulatory Support and Transplant Cardiology   of Medicine,  Division of Cardiology, [facility]

## 2019-02-06 ENCOUNTER — CARE COORDINATION (OUTPATIENT)
Dept: CARDIOLOGY | Facility: CLINIC | Age: 64
End: 2019-02-06

## 2019-02-06 ENCOUNTER — ANTICOAGULATION THERAPY VISIT (OUTPATIENT)
Dept: ANTICOAGULATION | Facility: CLINIC | Age: 64
End: 2019-02-06

## 2019-02-06 DIAGNOSIS — Z95.811 LVAD (LEFT VENTRICULAR ASSIST DEVICE) PRESENT (H): Primary | ICD-10-CM

## 2019-02-06 DIAGNOSIS — Z95.811 LVAD (LEFT VENTRICULAR ASSIST DEVICE) PRESENT (H): ICD-10-CM

## 2019-02-06 LAB — INR PPP: 2.4

## 2019-02-06 NOTE — PROGRESS NOTES
ANTICOAGULATION FOLLOW-UP CLINIC VISIT    Patient Name:  Danielito Chu  Date:  2019  Contact Type:  Telephone    SUBJECTIVE:     Patient Findings     Comments:   INR trending down.            OBJECTIVE    INR   Date Value Ref Range Status   2019 2.4  Final     Factor 2 Assay   Date Value Ref Range Status   10/27/2018 70 60 - 140 % Final     Comment:     The Factor 2 activity level is not a screening test for the Prothrombin 66919   mutation.         ASSESSMENT / PLAN  INR assessment THER    Recheck INR In: 5 DAYS    INR Location Outside lab      Anticoagulation Summary  As of 2019    INR goal:   2.0-3.0   TTR:   62.8 % (1.5 mo)   INR used for dosin.4 (2019)   Warfarin maintenance plan:   No maintenance plan   Full warfarin instructions:   : 3 mg; 2: 3 mg; : 3 mg; : 3 mg; 2/10: 3 mg   Plan last modified:   Kristie Fernandez RN (2018)   Next INR check:   2019   Priority:   INR   Target end date:   Indefinite    Indications    LVAD (left ventricular assist device) present (H) [Z95.811]             Anticoagulation Episode Summary     INR check location:       Preferred lab:       Send INR reminders to:   [facility]    Comments:   LVAD placed 18 ASA 81mg Daily Has Jantoven 2mg tablets  Lab# [phone]  Call pt at [phone].  Emphasize next INR date with patient.  Pt is home .      Anticoagulation Care Providers     Provider Role Specialty Phone number    Lorena Watkins MD Responsible Cardiology [phone]            See the Encounter Report to view Anticoagulation Flowsheet and Dosing Calendar (Go to Encounters tab in chart review, and find the Anticoagulation Therapy Visit)    Spoke with patient. Message left for Fani.     Bri Becerril, RN

## 2019-02-06 NOTE — PROGRESS NOTES
I called Fabiano to review his recent labs. All his labs were within normal range except for the LDH. At the , the LDH was in the mid 200s. At [facility] the LDH is in the low 500s.  P: Repeat LDH 2/13 here at the

## 2019-02-07 ENCOUNTER — CARE COORDINATION (OUTPATIENT)
Dept: CARDIOLOGY | Facility: CLINIC | Age: 64
End: 2019-02-07

## 2019-02-07 DIAGNOSIS — Z95.811 LVAD (LEFT VENTRICULAR ASSIST DEVICE) PRESENT (H): Primary | ICD-10-CM

## 2019-02-08 NOTE — TELEPHONE ENCOUNTER
Patient calling needing return to work letter.  He has been back to work for 2 weeks and they just told him he needed this.  You can fax letter to [phone] Attn:  Nathaly James.  He would like to have this reach his employer by Monday.  Any questions you can reach him @ [phone].   18

## 2019-02-11 NOTE — PROGRESS NOTES
Addendum 2/11/19 Pt reports he is coming to the [facility] on Wednesday 2/13 and is scheduled for labs. He will get an INR drawn this date. Writer instructed pt to take 3mg 2/11 and 2mg 2/12. Pt communicated understanding. Kristie Fernandez RN

## 2019-02-13 ENCOUNTER — CARE COORDINATION (OUTPATIENT)
Dept: CARDIOLOGY | Facility: CLINIC | Age: 64
End: 2019-02-13

## 2019-02-13 ENCOUNTER — DOCUMENTATION ONLY (OUTPATIENT)
Dept: CARDIOLOGY | Facility: CLINIC | Age: 64
End: 2019-02-13

## 2019-02-13 ENCOUNTER — ANTICOAGULATION THERAPY VISIT (OUTPATIENT)
Dept: ANTICOAGULATION | Facility: CLINIC | Age: 64
End: 2019-02-13

## 2019-02-13 ENCOUNTER — OFFICE VISIT (OUTPATIENT)
Dept: CARDIOLOGY | Facility: CLINIC | Age: 64
End: 2019-02-13
Attending: INTERNAL MEDICINE
Payer: COMMERCIAL

## 2019-02-13 VITALS
TEMPERATURE: 96.9 F | HEART RATE: 74 BPM | WEIGHT: 200.2 LBS | SYSTOLIC BLOOD PRESSURE: 72 MMHG | OXYGEN SATURATION: 98 % | BODY MASS INDEX: 29.65 KG/M2 | HEIGHT: 69 IN

## 2019-02-13 DIAGNOSIS — Z95.811 LVAD (LEFT VENTRICULAR ASSIST DEVICE) PRESENT (H): ICD-10-CM

## 2019-02-13 DIAGNOSIS — I50.22 CHRONIC SYSTOLIC HEART FAILURE (H): ICD-10-CM

## 2019-02-13 DIAGNOSIS — F43.23 SITUATIONAL MIXED ANXIETY AND DEPRESSIVE DISORDER: ICD-10-CM

## 2019-02-13 DIAGNOSIS — I10 ESSENTIAL HYPERTENSION: ICD-10-CM

## 2019-02-13 DIAGNOSIS — F12.10 MARIJUANA ABUSE: ICD-10-CM

## 2019-02-13 DIAGNOSIS — I50.21 ACUTE SYSTOLIC CONGESTIVE HEART FAILURE (H): Primary | ICD-10-CM

## 2019-02-13 DIAGNOSIS — I47.29 NSVT (NONSUSTAINED VENTRICULAR TACHYCARDIA) (H): ICD-10-CM

## 2019-02-13 LAB
ALBUMIN SERPL-MCNC: 3.6 G/DL (ref 3.4–5)
ALP SERPL-CCNC: 98 U/L (ref 40–150)
ALT SERPL W P-5'-P-CCNC: 17 U/L (ref 0–70)
ANION GAP SERPL CALCULATED.3IONS-SCNC: 9 MMOL/L (ref 3–14)
AST SERPL W P-5'-P-CCNC: 20 U/L (ref 0–45)
BILIRUB SERPL-MCNC: 0.7 MG/DL (ref 0.2–1.3)
BUN SERPL-MCNC: 16 MG/DL (ref 7–30)
CALCIUM SERPL-MCNC: 9 MG/DL (ref 8.5–10.1)
CHLORIDE SERPL-SCNC: 97 MMOL/L (ref 94–109)
CO2 SERPL-SCNC: 27 MMOL/L (ref 20–32)
CREAT SERPL-MCNC: 0.84 MG/DL (ref 0.66–1.25)
D DIMER PPP FEU-MCNC: 2 UG/ML FEU (ref 0–0.5)
ERYTHROCYTE [DISTWIDTH] IN BLOOD BY AUTOMATED COUNT: 17 % (ref 10–15)
GFR SERPL CREATININE-BSD FRML MDRD: >90 ML/MIN/{1.73_M2}
GLUCOSE SERPL-MCNC: 145 MG/DL (ref 70–99)
HCT VFR BLD AUTO: 45.8 % (ref 40–53)
HGB BLD-MCNC: 13.6 G/DL (ref 13.3–17.7)
INR PPP: 1.65 (ref 0.86–1.14)
LDH SERPL L TO P-CCNC: 214 U/L (ref 85–227)
MCH RBC QN AUTO: 26.3 PG (ref 26.5–33)
MCHC RBC AUTO-ENTMCNC: 29.7 G/DL (ref 31.5–36.5)
MCV RBC AUTO: 89 FL (ref 78–100)
PLATELET # BLD AUTO: 352 10E9/L (ref 150–450)
POTASSIUM SERPL-SCNC: 3.8 MMOL/L (ref 3.4–5.3)
PROT SERPL-MCNC: 8.5 G/DL (ref 6.8–8.8)
RBC # BLD AUTO: 5.17 10E12/L (ref 4.4–5.9)
SODIUM SERPL-SCNC: 133 MMOL/L (ref 133–144)
WBC # BLD AUTO: 8.8 10E9/L (ref 4–11)

## 2019-02-13 PROCEDURE — 85610 PROTHROMBIN TIME: CPT | Performed by: INTERNAL MEDICINE

## 2019-02-13 PROCEDURE — 80053 COMPREHEN METABOLIC PANEL: CPT | Performed by: INTERNAL MEDICINE

## 2019-02-13 PROCEDURE — 99215 OFFICE O/P EST HI 40 MIN: CPT | Mod: 25 | Performed by: NURSE PRACTITIONER

## 2019-02-13 PROCEDURE — 93750 INTERROGATION VAD IN PERSON: CPT | Mod: ZF

## 2019-02-13 PROCEDURE — 36415 COLL VENOUS BLD VENIPUNCTURE: CPT | Performed by: INTERNAL MEDICINE

## 2019-02-13 PROCEDURE — 85379 FIBRIN DEGRADATION QUANT: CPT | Performed by: INTERNAL MEDICINE

## 2019-02-13 PROCEDURE — 93750 INTERROGATION VAD IN PERSON: CPT | Mod: ZP | Performed by: NURSE PRACTITIONER

## 2019-02-13 PROCEDURE — 83615 LACTATE (LD) (LDH) ENZYME: CPT | Performed by: INTERNAL MEDICINE

## 2019-02-13 PROCEDURE — 85027 COMPLETE CBC AUTOMATED: CPT | Performed by: INTERNAL MEDICINE

## 2019-02-13 PROCEDURE — G0463 HOSPITAL OUTPT CLINIC VISIT: HCPCS | Mod: 25

## 2019-02-13 RX ORDER — POTASSIUM CHLORIDE 1500 MG/1
20 TABLET, EXTENDED RELEASE ORAL 2 TIMES DAILY
Qty: 90 TABLET | Refills: 3 | Status: SHIPPED | OUTPATIENT
Start: 2019-02-13 | End: 2019-04-24

## 2019-02-13 RX ORDER — BUMETANIDE 1 MG/1
TABLET ORAL
Qty: 90 TABLET | Refills: 3 | Status: SHIPPED | OUTPATIENT
Start: 2019-02-13 | End: 2019-04-24

## 2019-02-13 ASSESSMENT — PAIN SCALES - GENERAL: PAINLEVEL: NO PAIN (0)

## 2019-02-13 ASSESSMENT — MIFFLIN-ST. JEOR: SCORE: 1698.48

## 2019-02-13 NOTE — NURSING NOTE
1). PUMP DATA  Primary controller serial number: [id number]    HM 3:   Flow: 4.8 L/min,    Speed: 5500 RPMs,     PI: 2.9 ,  Power: 4.5 [place], Hct: 45.8     Primary controller   Back up battery: Patient use: 12, Replace in: 28  Months     Data downloaded: No   Equipment and driveline assessed for damage: Yes     Back up : Serial number: [id number]  Back up battery: Patient use: 11 Replace in: 27  Months  Programmed settings identical to the settings on the primary controller :Yes      Education complete: Yes   Charge the BACKUP controller s backup battery every 6 months  Perform a self test on BACKUP every 6 months  Change the MPU s batteries every 6 months:Yes    2). ALARMS  Alarms reported by patient since last pump evaluation: No  Alarms or other finding noted during pump data history and alarm download: Yes, some PI events with the speed dropping once to 5400 and once to 5350. The lowest PI recorded was 2.5.    Action Taken:  Reviewed data with patient: Yes      3). DRESSING CHANGE / DRIVELINE ASSESSMENT  Dressing change completed today: Yes  Appearance of Driveline site: some dried drainage.    Driveline stabilization: Method: [employer]  Teaching reinforced on need for stabilization of Driveline.    4).Pt. Education    D:  Pt education provided.  I:  Pt educated on the following topics:  1. When to call 911.  Reviewed emergency situations.  2. What to do if my VAD Coordinator is not returning my call.  3. When to page the VAD Coordinator on Call (handout provided w/ frequent symptoms to report).  4. Pt practice paged the VAD Coordinator on Call.   5. Pt given page of stickers with the number for the VAD Coordinator on Call.  6. Pt given list of frequently used resources and their numbers.  Reviewed when to call each resource.  (Social Work, Financial Counselor, Coumadin Clinic, Device Nurse, ).  A:  Pt verbalized understanding.  P:  VAD Coordinator available for questions or concerns.   Will continue VAD education.

## 2019-02-13 NOTE — PROGRESS NOTES
HPI: Fabiano is a 63-year-old gentleman with a past medical history of diabetes mellitus 2, hypertension, hyperlipidemia, SHIRLEY, mitral regurgitation, s/p ICD placement , and nonischemic cardiomyopathy with an EF of 15% s/p HeartMate 3 LVAD implant on 18 as destination therapy secondary to marijuana abuse and liver disease, complicated by postoperative Flolan and pressor support. He returns for routine follow up.    Fabiano has been struggling since our last visit.  He fatigues after walking around a few box stores.  He is sleeping and eating well.  He has gained weight.  Denies SOB at rest, PND, orthopnea, chest pain, or palpitations.      He is struggling mentally with his health status and feels anxious and depressed. He is getting calls from work wondering when he is coming back. He is concerned about finances and is feeling overwhelmed.  He hasn't made the appointment with the psychologist closer to home yet.      His LVAD parameters have been stable.  He denies HA, neurological changes, hematuria,  melena, epistaxis, fever, or chills.     PAST MEDICAL HISTORY:  Past Medical History:   Diagnosis Date     Acute systolic congestive heart failure (H) 2017     Diabetes (H)      HTN (hypertension)      Hyperlipidemia      Liver disease      LVAD (left ventricular assist device) present (H)      3      Marijuana abuse      Mitral regurgitation      Nocturnal oxygen desaturation 3/29/2018     Nonischemic cardiomyopathy (H) 2017     SHIRLEY (obstructive sleep apnea)      Pacemaker 2017    ICD 17     Situational mixed anxiety and depressive disorder        FAMILY HISTORY:  Family History   Problem Relation Age of Onset     Heart Failure Father          at age 86     Heart Failure Paternal Uncle          at 66      Myocardial Infarction Paternal Uncle          at age 62     Coronary Artery Disease Mother          during CABG at age 41       SOCIAL HISTORY:  Social History      Socioeconomic History     Marital status: Single     Spouse name: Not on file     Number of children: Not on file     Years of education: Not on file     Highest education level: Not on file   Social Needs     Financial resource strain: Not on file     Food insecurity - worry: Not on file     Food insecurity - inability: Not on file     Transportation needs - medical: Not on file     Transportation needs - non-medical: Not on file   Occupational History     Not on file   Tobacco Use     Smoking status: Former Smoker     Smokeless tobacco: Never Used     Tobacco comment: quit 3/2017   Substance and Sexual Activity     Alcohol use: No     Frequency: Never     Comment: social     Drug use: No     Sexual activity: Not on file   Other Topics Concern     Parent/sibling w/ CABG, MI or angioplasty before 65F 55M? Not Asked   Social History Narrative     Not on file       CURRENT MEDICATIONS:  Current Outpatient Medications   Medication Sig Dispense Refill     allopurinol (ZYLOPRIM) 100 MG tablet Take 2 tablets (200 mg) by mouth daily 60 tablet 1     artificial saliva (BIOTENE DRY MOUTHWASH) LIQD liquid Swish and spit 10 mLs in mouth 4 times daily as needed for dry mouth 59 mL 0     aspirin (ASA) 81 MG chewable tablet Take 1 tablet (81 mg) by mouth daily 120 tablet 0     Blood Glucose Monitoring Suppl (BLOOD GLUCOSE MONITOR SYSTEM) w/Device KIT three times a week       bumetanide (BUMEX) 1 MG tablet Take 1 tablet (1 mg) by mouth 2 times daily 60 tablet 3     colchicine (COLCYRS) 0.6 MG tablet Take 1 tablet (0.6 mg) by mouth 2 times daily 60 tablet 3     losartan (COZAAR) 25 MG tablet Take 1 tablet (25 mg) by mouth daily 30 tablet 3     metoprolol succinate ER (TOPROL XL) 25 MG 24 hr tablet Take 0.5 tablets (12.5 mg) by mouth daily 45 tablet 1     omeprazole (PRILOSEC) 20 MG DR capsule Take 1 capsule (20 mg) by mouth every morning (before breakfast) 90 capsule 3     potassium chloride ER (K-DUR/KLOR-CON M) 20 MEQ CR  "tablet Take 1 tablet (20 mEq) by mouth daily 90 tablet 3     spironolactone (ALDACTONE) 25 MG tablet Take 1.5 tablets (37.5 mg) by mouth daily 145 tablet 3     traZODone (DESYREL) 100 MG tablet Take 1 tablet (100 mg) by mouth nightly as needed for sleep 30 tablet 0     vitamin B1 (THIAMINE) 100 MG tablet Take 1 tablet (100 mg) by mouth daily 30 tablet 0     warfarin (COUMADIN) 2 MG tablet Take 3 mg PO daily at 6 pm until next INR check, then dose per INR goal 2-3 150 tablet 1       ROS:  Constitutional: Denies fever, chills, or diaphoresis.+weight gain  ENT: Denies visual disturbance, hearing loss, epistaxis, vertigo   Respiratory:  See HPI.     Cardiovascular: As per HPI.   GI: Denies nausea, vomiting, diarrhea, hematemesis, melena, or hematochezia.   : Denies urinary frequency, dysuria, or hematuria.   Integument: Negative for bruising, rash, or pruritis.  Psychiatric:+anxiety and depression, increased stress levels  Neuro: Negative for headaches, syncope, numbness or tingling.   Endocrinology: +DM  Musculoskeletal: Negative for gout, joint stiffness, swelling, or muscle weakness    EXAM:  BP (!) 72/0 (BP Location: Right arm, Patient Position: Chair, Cuff Size: Adult Regular)   Pulse 74   Temp 96.9  F (36.1  C) (Oral)   Ht 1.753 m (5' 9\")   Wt 90.8 kg (200 lb 3.2 oz)   SpO2 98%   BMI 29.56 kg/m    General: alert, articulate, and in no acute distress.  HEENT: normocephalic, atraumatic, anicteric sclera, EOMI, normal palate, mucosa moist, no cyanosis.    Neck: neck supple.  JVP 12 cm.   Heart: LVAD hum present   Lungs: clear, no rales, ronchi, or wheezing.  No accessory muscle use, respirations unlabored.   Abdomen: +abdominal distension. Non-tender, bowel sounds present, no hepatomegaly  Extremities: No cyanosis. Trace edema.  No palpable pedal pulses.  Neurological: Alert and oriented x 3.  Normal speech and affect, no gross motor deficits  Skin:  No ecchymoses, rashes, or clubbing.  Psych:  Teary eye at " times during the interview.  Appears anxious.     Labs:  CBC RESULTS:  Lab Results   Component Value Date    WBC 9.5 2019    RBC 4.48 2019    HGB 12.1 (A) 2019    HCT 39.5 2019    MCV 88.2 2019    MCH 27.0 2019    MCHC 30.6 2019    RDW 17.0 2019     2019     (H) 01/15/2019       CMP RESULTS:  Lab Results   Component Value Date     2019    POTASSIUM 4.4 2019    CHLORIDE 97 2019    CO2 29 2019    ANIONGAP 12 2019     (A) 2019    BUN 17 2019    CR 0.80 2019    GFRESTIMATED >90 2019    GFRESTBLACK >90 01/15/2019    ASHLEE 9.2 2019    BILITOTAL 0.8 01/15/2019    ALBUMIN 3.5 01/15/2019    ALKPHOS 128 01/15/2019    ALT 14 01/15/2019    AST 18 01/15/2019        INR RESULTS:  Lab Results   Component Value Date    INR 2.4 2019       No components found for: CK  Lab Results   Component Value Date    MAG 2.1 2018     Lab Results   Component Value Date    NTBNP 2,244 (H) 2018       Most recent echocardiogram:  Recent Results (from the past 4320 hour(s))   ECHO LIMITED WITH OPTISON    Narrative    [id number]  [id number]  [id number]  [id number]^MAKEDA^WILLI^GORAN           [facility],[place]  Echocardiography Laboratory  [address] [place] [id number]     Name: JUAN SHEN  MRN: [id number]  : 1955  Study Date: 2018 07:01 AM  Age: 63 yrs  Gender: Male  Patient Location: [facility]  Reason For Study: CHF  Ordering Physician: WILLI ASHFORD  Referring Physician: Dalton, [facility]  Performed By: Steve Tracey     BSA: 2.0 m2  Height: 68 in  Weight: 181 lb  HR: 70  BP: 103/54 mmHg  _____________________________________________________________________________  __        Procedure  Limited Portable Echo Adult. Contrast Optison.  _____________________________________________________________________________  __        Interpretation  Summary  Limited study. Severely dilated LV with severely reduced LV systolic function,  LVEF=10-15% (traced 12%.)  Valves were not assessed on this limited study.  Mildly dilated RV with mildly reduced function.  Dilation of the inferior vena cava is present with abnormal respiratory  variation in diameter. Estimated mean right atrial pressure is 15 mmHg  (significantly elevated).  This study was compared to a TTE from 10/23/2018: There has been no  significant change as assessed.     _____________________________________________________________________________  __        Left Ventricle  Severe left ventricular dilation is present. Severely (EF 10-20%) reduced left  ventricular function is present. Calculated LVEF 12% using traced biplane with  biplane. Severe diffuse hypokinesis is present. There is no thrombus.     Right Ventricle  Mild right ventricular dilation is present. Global right ventricular function  is mildly reduced. A pacemaker lead is noted in the right ventricle.     Atria  Severe biatrial enlargement is present.     Mitral Valve  The mitral valve cannot be assessed.        Aortic Valve  Aortic valve morphology is not well visualized but appears calcified. Not  interrogated with Doppler.     Tricuspid Valve  The tricuspid valve cannot be assessed.     Vessels  The aorta root cannot be assessed. The thoracic aorta cannot be assessed. The  pulmonary artery cannot be assessed. Dilation of the inferior vena cava is  present with abnormal respiratory variation in diameter. Estimated mean right  atrial pressure is 15 mmHg (significantly elevated).     Pericardium  No pericardial effusion is present.     Attestation  I have personally viewed the imaging and agree with the interpretation and  report as documented by the fellow, Kane Roman, and/or edited by me.     _____________________________________________________________________________  __  MMode/2D Measurements & Calculations  IVSd: 0.98 cm  LVIDd: 7.2  cm  LVIDs: 6.6 cm  LVPWd: 0.87 cm  FS: 7.4 %  LV mass(C)d: 302.4 grams  LV mass(C)dI: 154.4 grams/m2     LA dimension: 4.7 cm  LA Volume (BP): 162.0 ml  LA Volume Index (BP): 82.7 ml/m2  RWT: 0.24              _____________________________________________________________________________  __        Report approved by: Baron Bello 2018 08:38 AM      ECHO COMPLETE WITH OPTISON    Narrative    [id number]  [id number]  [id number]  [id number]^ELISE^MAYURI           [facility],[place]  Echocardiography Laboratory  [address] [place] [id number]     Name: JUAN SHEN  MRN: [id number]  : 1955  Study Date: 10/23/2018 04:06 PM  Age: 63 yrs  Gender: Male  Patient Location: [facility]  Reason For Study: Heart Failure  Ordering Physician: MAYURI OLIVARES  Referring Physician: Mora, [facility]  Performed By: Bryan Saez RDCS     BSA: 2.0 m2  Height: 69 in  Weight: 194 lb  BP: 106/74 mmHg  _____________________________________________________________________________  __        Procedure  Echocardiogram with two-dimensional, color and spectral Doppler performed.  Contrast Optison. Optison (NDC #4727-4581-92) given intravenously. Patient was  given 6 ml mixture of 3 ml Optison and 6 ml saline. 3 ml wasted.  _____________________________________________________________________________  __        Interpretation Summary  Severe left ventricular dilation is present. Severe diffuse hypokinesis is  present. Severely reduced left ventricular systolic function, EF is 18%.  Global right ventricular function is mildly reduced.  Mild aortic stenosis is present, mean gradient across the aortic valve is 10  mmHg. Mild aortic insufficiency is present. Mild to moderate tricuspid  insufficiency is present.  Estimated pulmonary artery systolic pressure is 29 mmHg plus right atrial  pressure. Estimated mean right atrial pressure is 15 mmHg  (significantly  elevated).  No pericardial effusion is present.  _____________________________________________________________________________  __        Left Ventricle  Severe left ventricular dilation is present. EDVi = 151 mL/m2. Severe diffuse  hypokinesis is present. Severely reduced left ventricular systolic function,  EF is 18%.     Right Ventricle  Mild right ventricular dilation is present. Global right ventricular function  is mildly reduced. A pacemaker lead is noted in the right ventricle.     Atria  Severe biatrial enlargement is present.     Mitral Valve  Mild mitral annular calcification is present. Mild mitral insufficiency is  present.        Aortic Valve  Mild aortic insufficiency is present. Mild aortic stenosis is present. The  peak aortic velocity is 2.1 m/sec. The mean gradient across the aortic valve  is10 mmHg.     Tricuspid Valve  Mild to moderate tricuspid insufficiency is present. Estimated pulmonary  artery systolic pressure is 29 mmHg plus right atrial pressure.     Pulmonic Valve  The pulmonic valve is normal.     Vessels  Dilation of the inferior vena cava is present with abnormal respiratory  variation in diameter. Estimated mean right atrial pressure is 15 mmHg  (significantly elevated).     Pericardium  No pericardial effusion is present.        Compared to Previous Study  This study was compared with the study from 10.17.18, LV function is  unchanged .  _____________________________________________________________________________  __  MMode/2D Measurements & Calculations     LA Volume (BP): 97.7 ml  LA Volume Index (BP): 47.9 ml/m2        Doppler Measurements & Calculations  MV E max jose: 196.5 cm/sec  MV A max jose: 63.1 cm/sec  MV E/A: 3.1  MV dec slope: 701.0 cm/sec2  Ao V2 max: 209.0 cm/sec  Ao max P.0 mmHg  Ao V2 mean: 151.0 cm/sec  Ao mean PG: 10.0 mmHg  Ao V2 VTI: 35.0 cm  AI P1/2t: 342.1 msec  LV V1 max P.2 mmHg  LV V1 max: 73.4 cm/sec  LV V1 VTI: 11.6 cm  TR max  amadeo: 230.8 cm/sec  TR max P.5 mmHg     AV Amadeo Ratio (DI): 0.35  E/E' av.9  Lateral E/e': 40.3  Medial E/e': 41.5     _____________________________________________________________________________  __           Report approved by: Baron Alegria 10/23/2018 05:15 PM      ECHO COMPLETE WITH OPTISON    Narrative    [id number]  [id number]  [id number]  [id number]^GAETANO^DRISS^ISSAC           [facility],[place]  Echocardiography Laboratory  [address] [place] [id number]     Name: JUAN SHEN  MRN: [id number]  : 1955  Study Date: 10/17/2018 01:30 PM  Age: 63 yrs  Gender: Male  Patient Location: [facility]  Reason For Study: , Chronic systolic heart failure (H)  Ordering Physician: DRISS SALTER  Referring Physician: DRISS SALTER  Performed By: PASHA Baum     BSA: 2.1 m2  Height: 69 in  Weight: 200 lb  BP: 105/70 mmHg  _____________________________________________________________________________  __        Procedure  Echocardiogram with two-dimensional, color and spectral Doppler performed.  Contrast Optison. Optison (NDC #9423-3943-11) given intravenously. Patient was  given 6.0 ml mixture of 3 ml Optison and 6 ml saline. 3.0 ml wasted.  _____________________________________________________________________________  __        Interpretation Summary  Severe left ventricular dilation is present.  Severe diffuse hypokinesis is present.  The Ejection Fraction is estimated at 10-15%.  Mild to moderate right ventricular dilation is present.  Global right ventricular function is mildly reduced.  Severe right atrial enlargement is present.  Severe tricuspid insufficiency is present.  Pulmonary artery systolic pressure cannot be assessed.  Dilation of the inferior vena cava is present with abnormal respiratory  variation in diameter.  No pericardial effusion is present.  Worsened LV function and TR since lasr  srudy.  _____________________________________________________________________________  __        Left Ventricle  Left ventricular wall thickness is normal. Severe left ventricular dilation is  present. Left ventricular diastolic function is indeterminate. The Ejection  Fraction is estimated at 10-15%. Severe diffuse hypokinesis is present.     Right Ventricle  Mild to moderate right ventricular dilation is present. Global right  ventricular function is mildly reduced. A pacemaker lead is noted in the right  ventricle.     Atria  Moderate left atrial enlargement is present. Severe right atrial enlargement  is present. The atrial septum is intact as assessed by color Doppler .     Mitral Valve  Mild mitral annular calcification is present. Mild mitral insufficiency is  present.        Aortic Valve  Mild aortic valve sclerosis is present. Mild aortic insufficiency is present.     Tricuspid Valve  Severe tricuspid insufficiency is present. Pulmonary artery systolic pressure  cannot be assessed.     Pulmonic Valve  The pulmonic valve is normal. Trace pulmonic insufficiency is present.     Vessels  The aorta root is normal. The pulmonary artery is normal. Dilation of the  inferior vena cava is present with abnormal respiratory variation in diameter.     Pericardium  No pericardial effusion is present.        Compared to Previous Study  Worsened LV function and TR since lasr srudy.  _____________________________________________________________________________  __  MMode/2D Measurements & Calculations     RVDd: 5.4 cm  IVSd: 0.84 cm  LVIDd: 6.7 cm  LVIDs: 6.6 cm  LVPWd: 0.90 cm  FS: 1.5 %  LV mass(C)d: 250.2 grams  LV mass(C)dI: 121.1 grams/m2  Ao root diam: 3.1 cm  LA dimension: 5.5 cm  asc Aorta Diam: 3.0 cm  LA/Ao: 1.7  LVOT diam: 2.1 cm  LVOT area: 3.5 cm2     EF(MOD-bp): 13.9 %  LA Volume (BP): 99.9 ml  RWT: 0.27  TAPSE: 1.5 cm        Doppler Measurements & Calculations  MV E max jose: 110.4 cm/sec  Ao V2 max: 203.5  cm/sec  Ao max P.5 mmHg  Ao V2 mean: 140.0 cm/sec  Ao mean P.7 mmHg  Ao V2 VTI: 32.5 cm  MONICA(I,D): 1.7 cm2  MONICA(V,D): 1.6 cm2  LV V1 max PG: 3.5 mmHg  LV V1 max: 93.2 cm/sec  LV V1 VTI: 15.8 cm     SV(LVOT): 55.5 ml  SI(LVOT): 26.9 ml/m2  TR max amadeo: 165.7 cm/sec  TR max P.2 mmHg  AV Amadeo Ratio (DI): 0.46  MONICA Index (cm2/m2): 0.83     _____________________________________________________________________________  __           Report approved by: Baron Villalba 10/17/2018 03:08 PM          Assessment and Plan:    Fabiano is a 63 year old gentleman with NICM s/p HM III LVAD placement who appears hypervolemic today.  I have increased his Bumex to 2 mg in the am and 1 mg in the afternoon as well as his potassium to 20 meq twice daily.  He will repeat a BMP in one week, and follow up with Dr. Watkins with a device check as scheduled in March.     I did recommend that he see a psychologist for his mental health concerns.  He appears depressed and anxious and is struggling with his health conditions.  A referral was placed previously for him.      In regards to work, I told him he can't work right now.  He will see Dr. Watkins in March.  She can decide at that time, depending on how he is doing medically, what his capacity is to go back to work at that time.     1.  Chronic systolic heart failure secondary to nonischemic cardiomyopathy.  Stage D  NYHA Class IIIA  ACEi/ARB: yes, continue losartan 25 mg daily  BB: Yes, metoprolol XL 12.5 mg daily.   aldosterone antagonist: yes, spironolactone 25 mg daily.  SCD prophylaxis: ICD  Fluid status: Hypervolemic.  Diurese as outlined above.      2.  S/P LVAD implant as destination therapy with plans to reevaluate for possible transplant candidacy in 3 months.  Anticoagulation: Warfarin with INR goal of 2-3.  ACC managing.  Antiplatelet: Aspirin 81 mg daily.  MAP: Controlled at 72.   LDH: 214    VAD Interrogation today: VAD interrogation reviewed with VAD coordinator.  Agree with findings. Some PI events with isolated speed drops from [id number].  Lowest PI reading recorded was 2.5.  No power spikes or other findings suspicious of pump malfunction noted.      3.  Hypertension: Controlled.  Continue metoprolol XL and losartan.     4.  Marijuana abuse: Currently abstinent.  Continue periodic screening for transplant candidacy.     5.  NSVT: Controlled.  Continue metoprolol XL.     6.  Anxiety and Depression: Strongly recommended that he see a psychologist and attend counseling sessions as recommended previously.     7.  Follow-up:  Dr. Watkins in March with device check.    >40 minutes spent face to face with patient with >50% in counseling, education, and coordination of care      Kylie NARANJO, CNP  Advanced Heart Failure/LVAD clinic

## 2019-02-13 NOTE — PATIENT INSTRUCTIONS
Medications:  1. Please increase your Bumex 2mg in the morning and 1mg in evening  2. Please increase your Potassium chloride to 20mEq twice per day.  3. Please notify Asim if you want to make any changes to your meds.    Follow-up:  1. 3/22 with Dr. Watkins  2. Please get labs next Wednesday 2/20/2019    Instructions:  1. Please make an appointment to see a psychologist in [place]. Our fax number here is [phone]. Please request that your psychologist fax us reports  2. I will order a doppler blood pressure machine from [employer].  3. I will resend the disability paperwork to the [employer] and I will mail you a copy.    Page the VAD Coordinator on call if you gain more than 3 lb in a day or 5 in a week. Please also page if you feel unwell or have alarms.     Great to see you in clinic today. To Page the VAD Coordinator on call, dial [phone] option #4 and ask to speak to the VAD coordinator on call.

## 2019-02-13 NOTE — LETTER
2/13/2019      RE: Danielito Chu  [address] [place] [address]       Dear Colleague,    Thank you for the opportunity to participate in the care of your patient, Danielito Chu, at the [facility] HEART [facility]. Please see a copy of my visit note below.        HPI: Fabiano is a 63-year-old gentleman with a past medical history of diabetes mellitus 2, hypertension, hyperlipidemia, SHIRLEY, mitral regurgitation, s/p ICD placement 4/17, and nonischemic cardiomyopathy with an EF of 15% s/p HeartMate 3 LVAD implant on 12/5/18 as destination therapy secondary to marijuana abuse and liver disease, complicated by postoperative Flolan and pressor support. He returns for routine follow up.    Fabiano has been struggling since our last visit.  He fatigues after walking around a few box stores.  He is sleeping and eating well.  He has gained weight.  Denies SOB at rest, PND, orthopnea, chest pain, or palpitations.      He is struggling mentally with his health status and feels anxious and depressed. He is getting calls from work wondering when he is coming back. He is concerned about finances and is feeling overwhelmed.  He hasn't made the appointment with the psychologist closer to home yet.      His LVAD parameters have been stable.  He denies HA, neurological changes, hematuria,  melena, epistaxis, fever, or chills.     PAST MEDICAL HISTORY:  Past Medical History:   Diagnosis Date     Acute systolic congestive heart failure (H) 4/5/2017     Diabetes (H)      HTN (hypertension)      Hyperlipidemia      Liver disease      LVAD (left ventricular assist device) present (H)      3 12/18     Marijuana abuse      Mitral regurgitation      Nocturnal oxygen desaturation 3/29/2018     Nonischemic cardiomyopathy (H) 4/5/2017     SHIRLEY (obstructive sleep apnea)      Pacemaker 04/07/2017    ICD 4/7/17     Situational mixed anxiety and depressive disorder        FAMILY HISTORY:  Family  History   Problem Relation Age of Onset     Heart Failure Father          at age 86     Heart Failure Paternal Uncle          at 66      Myocardial Infarction Paternal Uncle          at age 62     Coronary Artery Disease Mother          during CABG at age 41       SOCIAL HISTORY:  Social History     Socioeconomic History     Marital status: Single     Spouse name: Not on file     Number of children: Not on file     Years of education: Not on file     Highest education level: Not on file   Social Needs     Financial resource strain: Not on file     Food insecurity - worry: Not on file     Food insecurity - inability: Not on file     Transportation needs - medical: Not on file     Transportation needs - non-medical: Not on file   Occupational History     Not on file   Tobacco Use     Smoking status: Former Smoker     Smokeless tobacco: Never Used     Tobacco comment: quit 3/2017   Substance and Sexual Activity     Alcohol use: No     Frequency: Never     Comment: social     Drug use: No     Sexual activity: Not on file   Other Topics Concern     Parent/sibling w/ CABG, MI or angioplasty before 65F 55M? Not Asked   Social History Narrative     Not on file       CURRENT MEDICATIONS:  Current Outpatient Medications   Medication Sig Dispense Refill     allopurinol (ZYLOPRIM) 100 MG tablet Take 2 tablets (200 mg) by mouth daily 60 tablet 1     artificial saliva (BIOTENE DRY MOUTHWASH) LIQD liquid Swish and spit 10 mLs in mouth 4 times daily as needed for dry mouth 59 mL 0     aspirin (ASA) 81 MG chewable tablet Take 1 tablet (81 mg) by mouth daily 120 tablet 0     Blood Glucose Monitoring Suppl (BLOOD GLUCOSE MONITOR SYSTEM) w/Device KIT three times a week       bumetanide (BUMEX) 1 MG tablet Take 1 tablet (1 mg) by mouth 2 times daily 60 tablet 3     colchicine (COLCYRS) 0.6 MG tablet Take 1 tablet (0.6 mg) by mouth 2 times daily 60 tablet 3     losartan (COZAAR) 25 MG tablet Take 1 tablet (25 mg) by mouth  "daily 30 tablet 3     metoprolol succinate ER (TOPROL XL) 25 MG 24 hr tablet Take 0.5 tablets (12.5 mg) by mouth daily 45 tablet 1     omeprazole (PRILOSEC) 20 MG DR capsule Take 1 capsule (20 mg) by mouth every morning (before breakfast) 90 capsule 3     potassium chloride ER (K-DUR/KLOR-CON M) 20 MEQ CR tablet Take 1 tablet (20 mEq) by mouth daily 90 tablet 3     spironolactone (ALDACTONE) 25 MG tablet Take 1.5 tablets (37.5 mg) by mouth daily 145 tablet 3     traZODone (DESYREL) 100 MG tablet Take 1 tablet (100 mg) by mouth nightly as needed for sleep 30 tablet 0     vitamin B1 (THIAMINE) 100 MG tablet Take 1 tablet (100 mg) by mouth daily 30 tablet 0     warfarin (COUMADIN) 2 MG tablet Take 3 mg PO daily at 6 pm until next INR check, then dose per INR goal 2-3 150 tablet 1       ROS:  Constitutional: Denies fever, chills, or diaphoresis.+weight gain  ENT: Denies visual disturbance, hearing loss, epistaxis, vertigo   Respiratory:  See HPI.     Cardiovascular: As per HPI.   GI: Denies nausea, vomiting, diarrhea, hematemesis, melena, or hematochezia.   : Denies urinary frequency, dysuria, or hematuria.   Integument: Negative for bruising, rash, or pruritis.  Psychiatric:+anxiety and depression, increased stress levels  Neuro: Negative for headaches, syncope, numbness or tingling.   Endocrinology: +DM  Musculoskeletal: Negative for gout, joint stiffness, swelling, or muscle weakness    EXAM:  BP (!) 72/0 (BP Location: Right arm, Patient Position: Chair, Cuff Size: Adult Regular)   Pulse 74   Temp 96.9  F (36.1  C) (Oral)   Ht 1.753 m (5' 9\")   Wt 90.8 kg (200 lb 3.2 oz)   SpO2 98%   BMI 29.56 kg/m     General: alert, articulate, and in no acute distress.  HEENT: normocephalic, atraumatic, anicteric sclera, EOMI, normal palate, mucosa moist, no cyanosis.    Neck: neck supple.  JVP 12 cm.   Heart: LVAD hum present   Lungs: clear, no rales, ronchi, or wheezing.  No accessory muscle use, respirations unlabored. "   Abdomen: +abdominal distension. Non-tender, bowel sounds present, no hepatomegaly  Extremities: No cyanosis. Trace edema.  No palpable pedal pulses.  Neurological: Alert and oriented x 3.  Normal speech and affect, no gross motor deficits  Skin:  No ecchymoses, rashes, or clubbing.  Psych:  Teary eye at times during the interview.  Appears anxious.     Labs:  CBC RESULTS:  Lab Results   Component Value Date    WBC 9.5 2019    RBC 4.48 2019    HGB 12.1 (A) 2019    HCT 39.5 2019    MCV 88.2 2019    MCH 27.0 2019    MCHC 30.6 2019    RDW 17.0 2019     2019     (H) 01/15/2019       CMP RESULTS:  Lab Results   Component Value Date     2019    POTASSIUM 4.4 2019    CHLORIDE 97 2019    CO2 29 2019    ANIONGAP 12 2019     (A) 2019    BUN 17 2019    CR 0.80 2019    GFRESTIMATED >90 2019    GFRESTBLACK >90 01/15/2019    AHSLEE 9.2 2019    BILITOTAL 0.8 01/15/2019    ALBUMIN 3.5 01/15/2019    ALKPHOS 128 01/15/2019    ALT 14 01/15/2019    AST 18 01/15/2019        INR RESULTS:  Lab Results   Component Value Date    INR 2.4 2019       No components found for: CK  Lab Results   Component Value Date    MAG 2.1 2018     Lab Results   Component Value Date    NTBNP 2,244 (H) 2018       Most recent echocardiogram:  Recent Results (from the past 4320 hour(s))   ECHO LIMITED WITH OPTISON    Narrative    [id number]  [id number]  [id number]  [id number]^MAKEDA^WILLI^GORAN           [facility],[place]  Echocardiography Laboratory  [address] [place] [id number]     Name: JUAN SHEN  MRN: [id number]  : 1955  Study Date: 2018 07:01 AM  Age: 63 yrs  Gender: Male  Patient Location: [facility]  Reason For Study: CHF  Ordering Physician: WILLI ASHFORD  Referring Physician: [place], [facility]  Performed By: Steve Tracey     BSA: 2.0  m2  Height: 68 in  Weight: 181 lb  HR: 70  BP: 103/54 mmHg  _____________________________________________________________________________  __        Procedure  Limited Portable Echo Adult. Contrast Optison.  _____________________________________________________________________________  __        Interpretation Summary  Limited study. Severely dilated LV with severely reduced LV systolic function,  LVEF=10-15% (traced 12%.)  Valves were not assessed on this limited study.  Mildly dilated RV with mildly reduced function.  Dilation of the inferior vena cava is present with abnormal respiratory  variation in diameter. Estimated mean right atrial pressure is 15 mmHg  (significantly elevated).  This study was compared to a TTE from 10/23/2018: There has been no  significant change as assessed.     _____________________________________________________________________________  __        Left Ventricle  Severe left ventricular dilation is present. Severely (EF 10-20%) reduced left  ventricular function is present. Calculated LVEF 12% using traced biplane with  biplane. Severe diffuse hypokinesis is present. There is no thrombus.     Right Ventricle  Mild right ventricular dilation is present. Global right ventricular function  is mildly reduced. A pacemaker lead is noted in the right ventricle.     Atria  Severe biatrial enlargement is present.     Mitral Valve  The mitral valve cannot be assessed.        Aortic Valve  Aortic valve morphology is not well visualized but appears calcified. Not  interrogated with Doppler.     Tricuspid Valve  The tricuspid valve cannot be assessed.     Vessels  The aorta root cannot be assessed. The thoracic aorta cannot be assessed. The  pulmonary artery cannot be assessed. Dilation of the inferior vena cava is  present with abnormal respiratory variation in diameter. Estimated mean right  atrial pressure is 15 mmHg (significantly elevated).     Pericardium  No pericardial effusion is  present.     Attestation  I have personally viewed the imaging and agree with the interpretation and  report as documented by the fellow, Kane Roman, and/or edited by me.     _____________________________________________________________________________  __  MMode/2D Measurements & Calculations  IVSd: 0.98 cm  LVIDd: 7.2 cm  LVIDs: 6.6 cm  LVPWd: 0.87 cm  FS: 7.4 %  LV mass(C)d: 302.4 grams  LV mass(C)dI: 154.4 grams/m2     LA dimension: 4.7 cm  LA Volume (BP): 162.0 ml  LA Volume Index (BP): 82.7 ml/m2  RWT: 0.24              _____________________________________________________________________________  __        Report approved by: Baron Bello 2018 08:38 AM      ECHO COMPLETE WITH OPTISON    Narrative    [id number]  [id number]  [id number]  [id number]^ELISE^MAYURI           [facility],[place]  Echocardiography Laboratory  [address] [place] [id number]     Name: JUAN SHEN  MRN: [id number]  : 1955  Study Date: 10/23/2018 04:06 PM  Age: 63 yrs  Gender: Male  Patient Location: [facility]  Reason For Study: Heart Failure  Ordering Physician: MAYURI OLIVARES  Referring Physician: [place], [facility]  Performed By: Bryan Saez RDCS     BSA: 2.0 m2  Height: 69 in  Weight: 194 lb  BP: 106/74 mmHg  _____________________________________________________________________________  __        Procedure  Echocardiogram with two-dimensional, color and spectral Doppler performed.  Contrast Optison. Optison (NDC #7005-4328-36) given intravenously. Patient was  given 6 ml mixture of 3 ml Optison and 6 ml saline. 3 ml wasted.  _____________________________________________________________________________  __        Interpretation Summary  Severe left ventricular dilation is present. Severe diffuse hypokinesis is  present. Severely reduced left ventricular systolic function, EF is 18%.  Global right ventricular function is mildly reduced.  Mild  aortic stenosis is present, mean gradient across the aortic valve is 10  mmHg. Mild aortic insufficiency is present. Mild to moderate tricuspid  insufficiency is present.  Estimated pulmonary artery systolic pressure is 29 mmHg plus right atrial  pressure. Estimated mean right atrial pressure is 15 mmHg (significantly  elevated).  No pericardial effusion is present.  _____________________________________________________________________________  __        Left Ventricle  Severe left ventricular dilation is present. EDVi = 151 mL/m2. Severe diffuse  hypokinesis is present. Severely reduced left ventricular systolic function,  EF is 18%.     Right Ventricle  Mild right ventricular dilation is present. Global right ventricular function  is mildly reduced. A pacemaker lead is noted in the right ventricle.     Atria  Severe biatrial enlargement is present.     Mitral Valve  Mild mitral annular calcification is present. Mild mitral insufficiency is  present.        Aortic Valve  Mild aortic insufficiency is present. Mild aortic stenosis is present. The  peak aortic velocity is 2.1 m/sec. The mean gradient across the aortic valve  is10 mmHg.     Tricuspid Valve  Mild to moderate tricuspid insufficiency is present. Estimated pulmonary  artery systolic pressure is 29 mmHg plus right atrial pressure.     Pulmonic Valve  The pulmonic valve is normal.     Vessels  Dilation of the inferior vena cava is present with abnormal respiratory  variation in diameter. Estimated mean right atrial pressure is 15 mmHg  (significantly elevated).     Pericardium  No pericardial effusion is present.        Compared to Previous Study  This study was compared with the study from 10.17.18, LV function is  unchanged .  _____________________________________________________________________________  __  MMode/2D Measurements & Calculations     LA Volume (BP): 97.7 ml  LA Volume Index (BP): 47.9 ml/m2        Doppler Measurements & Calculations  MV E  max amadeo: 196.5 cm/sec  MV A max amadeo: 63.1 cm/sec  MV E/A: 3.1  MV dec slope: 701.0 cm/sec2  Ao V2 max: 209.0 cm/sec  Ao max P.0 mmHg  Ao V2 mean: 151.0 cm/sec  Ao mean PG: 10.0 mmHg  Ao V2 VTI: 35.0 cm  AI P1/2t: 342.1 msec  LV V1 max P.2 mmHg  LV V1 max: 73.4 cm/sec  LV V1 VTI: 11.6 cm  TR max amadeo: 230.8 cm/sec  TR max P.5 mmHg     AV Amadeo Ratio (DI): 0.35  E/E' av.9  Lateral E/e': 40.3  Medial E/e': 41.5     _____________________________________________________________________________  __           Report approved by: Baron Alegria 10/23/2018 05:15 PM      ECHO COMPLETE WITH OPTISON    Narrative    [id number]  ECH73  [id number]  [id number]^GAETANO^DRISS^ISSAC           [facility],[place]  Echocardiography Laboratory  [address] [place] [address]     Name: JUAN SHEN  MRN: [id number]  : 1955  Study Date: 10/17/2018 01:30 PM  Age: 63 yrs  Gender: Male  Patient Location: [facility]  Reason For Study: , Chronic systolic heart failure (H)  Ordering Physician: DRISS SALTER  Referring Physician: DRISS SALTER  Performed By: PASHA Baum     BSA: 2.1 m2  Height: 69 in  Weight: 200 lb  BP: 105/70 mmHg  _____________________________________________________________________________  __        Procedure  Echocardiogram with two-dimensional, color and spectral Doppler performed.  Contrast Optison. Optison (NDC #5310-7982-27) given intravenously. Patient was  given 6.0 ml mixture of 3 ml Optison and 6 ml saline. 3.0 ml wasted.  _____________________________________________________________________________  __        Interpretation Summary  Severe left ventricular dilation is present.  Severe diffuse hypokinesis is present.  The Ejection Fraction is estimated at 10-15%.  Mild to moderate right ventricular dilation is present.  Global right ventricular function is mildly reduced.  Severe right atrial enlargement is present.  Severe  tricuspid insufficiency is present.  Pulmonary artery systolic pressure cannot be assessed.  Dilation of the inferior vena cava is present with abnormal respiratory  variation in diameter.  No pericardial effusion is present.  Worsened LV function and TR since lasr srudy.  _____________________________________________________________________________  __        Left Ventricle  Left ventricular wall thickness is normal. Severe left ventricular dilation is  present. Left ventricular diastolic function is indeterminate. The Ejection  Fraction is estimated at 10-15%. Severe diffuse hypokinesis is present.     Right Ventricle  Mild to moderate right ventricular dilation is present. Global right  ventricular function is mildly reduced. A pacemaker lead is noted in the right  ventricle.     Atria  Moderate left atrial enlargement is present. Severe right atrial enlargement  is present. The atrial septum is intact as assessed by color Doppler .     Mitral Valve  Mild mitral annular calcification is present. Mild mitral insufficiency is  present.        Aortic Valve  Mild aortic valve sclerosis is present. Mild aortic insufficiency is present.     Tricuspid Valve  Severe tricuspid insufficiency is present. Pulmonary artery systolic pressure  cannot be assessed.     Pulmonic Valve  The pulmonic valve is normal. Trace pulmonic insufficiency is present.     Vessels  The aorta root is normal. The pulmonary artery is normal. Dilation of the  inferior vena cava is present with abnormal respiratory variation in diameter.     Pericardium  No pericardial effusion is present.        Compared to Previous Study  Worsened LV function and TR since lasr srudy.  _____________________________________________________________________________  __  MMode/2D Measurements & Calculations     RVDd: 5.4 cm  IVSd: 0.84 cm  LVIDd: 6.7 cm  LVIDs: 6.6 cm  LVPWd: 0.90 cm  FS: 1.5 %  LV mass(C)d: 250.2 grams  LV mass(C)dI: 121.1 grams/m2  Ao root diam: 3.1  cm  LA dimension: 5.5 cm  asc Aorta Diam: 3.0 cm  LA/Ao: 1.7  LVOT diam: 2.1 cm  LVOT area: 3.5 cm2     EF(MOD-bp): 13.9 %  LA Volume (BP): 99.9 ml  RWT: 0.27  TAPSE: 1.5 cm        Doppler Measurements & Calculations  MV E max amadeo: 110.4 cm/sec  Ao V2 max: 203.5 cm/sec  Ao max P.5 mmHg  Ao V2 mean: 140.0 cm/sec  Ao mean P.7 mmHg  Ao V2 VTI: 32.5 cm  MONICA(I,D): 1.7 cm2  MONICA(V,D): 1.6 cm2  LV V1 max PG: 3.5 mmHg  LV V1 max: 93.2 cm/sec  LV V1 VTI: 15.8 cm     SV(LVOT): 55.5 ml  SI(LVOT): 26.9 ml/m2  TR max amadeo: 165.7 cm/sec  TR max P.2 mmHg  AV Amadeo Ratio (DI): 0.46  MONICA Index (cm2/m2): 0.83     _____________________________________________________________________________  __           Report approved by: Baron Villalba 10/17/2018 03:08 PM          Assessment and Plan:    Fabiano is a 63 year old gentleman with NICM s/p HM III LVAD placement who appears hypervolemic today.  I have increased his Bumex to 2 mg in the am and 1 mg in the afternoon as well as his potassium to 20 meq twice daily.  He will repeat a BMP in one week, and follow up with Dr. Watkins with a device check as scheduled in March.     I did recommend that he see a psychologist for his mental health concerns.  He appears depressed and anxious and is struggling with his health conditions.  A referral was placed previously for him.      In regards to work, I told him he can't work right now.  He will see Dr. Watkins in March.  She can decide at that time, depending on how he is doing medically, what his capacity is to go back to work at that time.     1.  Chronic systolic heart failure secondary to nonischemic cardiomyopathy.  Stage D  NYHA Class IIIA  ACEi/ARB: yes, continue losartan 25 mg daily  BB: Yes, metoprolol XL 12.5 mg daily.   aldosterone antagonist: yes, spironolactone 25 mg daily.  SCD prophylaxis: ICD  Fluid status:  Hypervolemic.  Diurese as outlined above.      2.  S/P LVAD implant as destination therapy with plans to  reevaluate for possible transplant candidacy in 3 months.  Anticoagulation: Warfarin with INR goal of 2-3.  ACC managing.  Antiplatelet: Aspirin 81 mg daily.  MAP:  Controlled at 72.   LDH:  214    VAD Interrogation today: VAD interrogation reviewed with VAD coordinator. Agree with findings. Some PI events with isolated speed drops from [id number].  Lowest PI reading recorded was 2.5.  No power spikes or other findings suspicious of pump malfunction noted.      3.  Hypertension: Controlled.  Continue metoprolol XL and losartan.     4.  Marijuana abuse: Currently abstinent.  Continue periodic screening for transplant candidacy.     5.  NSVT: Controlled.  Continue metoprolol XL.     6.   Anxiety and Depression: Strongly recommended that he see a psychologist and attend counseling sessions as recommended previously.     7.  Follow-up:  Dr. Watkins in March with device check.    >40 minutes spent face to face with patient with >50% in counseling, education, and coordination of care      Kylie NARANJO, CNP  Advanced Heart Failure/LVAD clinic

## 2019-02-13 NOTE — PROGRESS NOTES
ANTICOAGULATION FOLLOW-UP CLINIC VISIT    Patient Name:  Danielito Chu  Date:  2019  Contact Type:  Telephone    SUBJECTIVE:     Patient Findings     Comments:   Instructions were given to increase ASA 325mg daily.  Spoke with Kalli from the LVAD team and Dr. Watkins has instructed ACC to increase warfarin dose without Lovenox bridge.            OBJECTIVE    INR   Date Value Ref Range Status   2019 1.65 (H) 0.86 - 1.14 Final     Factor 2 Assay   Date Value Ref Range Status   10/27/2018 70 60 - 140 % Final     Comment:     The Factor 2 activity level is not a screening test for the Prothrombin 51596   mutation.         ASSESSMENT / PLAN  No question data found.  Anticoagulation Summary  As of 2019    INR goal:   2.0-3.0   TTR:   61.5 % (1.8 mo)   INR used for dosin.65! (2019)   Warfarin maintenance plan:   No maintenance plan   Full warfarin instructions:   : 4 mg; : 4 mg   Plan last modified:   Kristie Fernandez RN (2018)   Next INR check:   2/15/2019   Priority:   INR   Target end date:   Indefinite    Indications    LVAD (left ventricular assist device) present (H) [Z95.811]             Anticoagulation Episode Summary     INR check location:       Preferred lab:       Send INR reminders to:   [facility]    Comments:   LVAD placed 18 ASA 81mg Daily Has Jantoven 2mg tablets  Lab# [phone]  Call pt at [phone].  Emphasize next INR date with patient.  Pt is home .      Anticoagulation Care Providers     Provider Role Specialty Phone number    Lorena Watkins MD [facility] Cardiology [phone]            See the Encounter Report to view Anticoagulation Flowsheet and Dosing Calendar (Go to Encounters tab in chart review, and find the Anticoagulation Therapy Visit)    Left message for patient with results and dosing recommendations. Asked patient to call back to report any missed doses, falls, signs and symptoms of bleeding or clotting, any  changes in health, medication, or diet. Asked patient to call back with any questions or concerns.     Bri Becerril RN          Addendum 2/13/19 Patient calls to report that he has been taking 2mg of warfarin for the past week. He is requesting that a my chart message is sent every time

## 2019-02-15 ENCOUNTER — ANTICOAGULATION THERAPY VISIT (OUTPATIENT)
Dept: ANTICOAGULATION | Facility: CLINIC | Age: 64
End: 2019-02-15

## 2019-02-15 DIAGNOSIS — Z95.811 LVAD (LEFT VENTRICULAR ASSIST DEVICE) PRESENT (H): ICD-10-CM

## 2019-02-15 LAB
BASOPHILS NFR BLD AUTO: 1.6 %
EOSINOPHIL NFR BLD AUTO: 3.6 %
ERYTHROCYTE [DISTWIDTH] IN BLOOD BY AUTOMATED COUNT: 16.8 %
HCT VFR BLD AUTO: 41.2 %
HEMOGLOBIN: 12.8 G/DL (ref 13.3–17.7)
INR PPP: 1.7
LYMPHOCYTES NFR BLD AUTO: 28.3 %
MCH RBC QN AUTO: 27 PG
MCHC RBC AUTO-ENTMCNC: 31.1 G/DL
MCV RBC AUTO: ABNORMAL FL
MONOCYTES NFR BLD AUTO: 11.7 %
NEUTROPHILS NFR BLD AUTO: 54.8 %
PLATELET COUNT - QUEST: 341 10^9/L (ref 150–450)
RBC # BLD AUTO: 4.76 10^12/L
WBC # BLD AUTO: 7.7 10^9/L

## 2019-02-15 NOTE — PROGRESS NOTES
ANTICOAGULATION FOLLOW-UP CLINIC VISIT    Patient Name:  Danielito Chu  Date:  2/15/2019  Contact Type:  Telephone    SUBJECTIVE:     Patient Findings     Positives:   Med error    Comments:   Pt reports he took 3mg  and 3mg  instead of 4mg. Will document this on the calendar to reflect this. Instructed pt to continue ASA 325mg Daily            OBJECTIVE    INR   Date Value Ref Range Status   02/15/2019 1.70  Final     Comment:     Outside Lab      Factor 2 Assay   Date Value Ref Range Status   10/27/2018 70 60 - 140 % Final     Comment:     The Factor 2 activity level is not a screening test for the Prothrombin 38681   mutation.         ASSESSMENT / PLAN  INR assessment SUB    Recheck INR In: 3 DAYS    INR Location Outside lab      Anticoagulation Summary  As of 2/15/2019    INR goal:   2.0-3.0   TTR:   59.8 % (1.8 mo)   INR used for dosin.70! (2/15/2019)   Warfarin maintenance plan:   No maintenance plan   Full warfarin instructions:   2/15: 4 mg; : 4 mg; : 3 mg   Plan last modified:   Kristie Fernandez RN (2018)   Next INR check:   2019   Priority:   INR   Target end date:   Indefinite    Indications    LVAD (left ventricular assist device) present (H) [Z95.811]             Anticoagulation Episode Summary     INR check location:       Preferred lab:       Send INR reminders to:   [facility]    Comments:   LVAD placed 18 ASA 81mg Daily Has Jantoven 2mg tablets  Lab# [phone]  Call pt at [phone].  Emphasize next INR date with patient. ++Send email each time++  Pt is home .      Anticoagulation Care Providers     Provider Role Specialty Phone number    Lorena Watkins MD Responsible Cardiology [phone]            See the Encounter Report to view Anticoagulation Flowsheet and Dosing Calendar (Go to Encounters tab in chart review, and find the Anticoagulation Therapy Visit)    Spoke with patient. Gave them their lab results and new warfarin  recommendation.  No changes in health, medication, or diet. No missed doses, no falls. No signs or symptoms of bleed or clotting.     Also e-mailed pt with the above info per request     Kristie Fernandez RN

## 2019-02-18 ENCOUNTER — CARE COORDINATION (OUTPATIENT)
Dept: CARDIOLOGY | Facility: CLINIC | Age: 64
End: 2019-02-18

## 2019-02-18 LAB — INR PPP: 2.4 (ref 0.8–1.1)

## 2019-02-18 NOTE — PROGRESS NOTES
Saw patient in the clinic and checked in 8 weeks  post discharge. Pt reports VAD parameters stable and weight stable. Reviewed medications and answered any questions. Patient reports sleeping fine and having increased anxiety about his work, disability claim, and being cooped up during the snow and cold. since being home with LVAD. Patient is fully able to move around the house and care for him/herself independently.    Discussed specific new problems/stressors since being discharged from the hospital: Fabiano will schedule a health psychology appointment with a doctor at [facility]. Empathized with patient and reviewed coping strategies: enlisting support from friends and love ones, attending patient and caregiver support groups, reviewing LVAD educational materials to reinforce knowledge, and talking about concerns with family/care providers/trusted others. Encouraged pt to page VAD Coordinator with any issues or questions. Pt verbalizes understanding.

## 2019-02-19 ENCOUNTER — ANTICOAGULATION THERAPY VISIT (OUTPATIENT)
Dept: ANTICOAGULATION | Facility: CLINIC | Age: 64
End: 2019-02-19

## 2019-02-19 DIAGNOSIS — Z95.811 LVAD (LEFT VENTRICULAR ASSIST DEVICE) PRESENT (H): ICD-10-CM

## 2019-02-19 NOTE — PROGRESS NOTES
D: Patient called VAD Coordinator on-call stating that he got his INR drawn today but did not receive dosing instructions yet.  I/A: Patient stated that his INR was 2.4 today.  Discussed patient with Dr. Lincoln. Per Dr. Lincoln, instructed patient to take 3mg of coumadin tonight and go back to 81mg of ASA.  Instructed patient to call [facility] tomorrow morning for further instructions.  P: Patient verbalized understanding of dosing instructions and stated that he will call [facility] in the morning.

## 2019-02-19 NOTE — PROGRESS NOTES
Addendum 2/19/19    Kely Bojorquez RN 13 hours ago (8:22 PM)         D: Patient called VAD Coordinator on-call stating that he got his INR drawn today but did not receive dosing instructions yet.  I/A: Patient stated that his INR was 2.4 today.  Discussed patient with Dr. Lincoln. Per Dr. Lincoln, instructed patient to take 3mg of coumadin tonight and go back to 81mg of ASA.  Instructed patient to call [facility] tomorrow morning for further instructions.  P: Patient verbalized understanding of dosing instructions and stated that he will call [facility] in the morning.             Documentation

## 2019-02-19 NOTE — PROGRESS NOTES
ANTICOAGULATION FOLLOW-UP CLINIC VISIT    Patient Name:  Danielito Chu  Date:  2019  Contact Type:  Telephone    SUBJECTIVE:     Patient Findings     Positives:   No Problem Findings           OBJECTIVE    INR   Date Value Ref Range Status   2019 2.4 (A) 0.8 - 1.1 Final     Factor 2 Assay   Date Value Ref Range Status   10/27/2018 70 60 - 140 % Final     Comment:     The Factor 2 activity level is not a screening test for the Prothrombin 61949   mutation.         ASSESSMENT / PLAN  INR assessment THER    Recheck INR In: 4 DAYS    INR Location Outside lab      Anticoagulation Summary  As of 2019    INR goal:   2.0-3.0   TTR:   59.6 % (1.9 mo)   INR used for dosin.4 (2019)   Warfarin maintenance plan:   No maintenance plan   Full warfarin instructions:   : 3 mg; : 3 mg; : 3 mg   Plan last modified:   Kristie Fernandez RN (2018)   Next INR check:   2019   Priority:   INR   Target end date:   Indefinite    Indications    LVAD (left ventricular assist device) present (H) [Z95.811]             Anticoagulation Episode Summary     INR check location:       Preferred lab:       Send INR reminders to:    [facility]    Comments:   LVAD placed 18 ASA 81mg Daily Has Jantoven 2mg tablets  Lab# [phone]  Call pt at [phone].  Emphasize next INR date with patient. ++Send email each time++  Pt is home .      Anticoagulation Care Providers     Provider Role Specialty Phone number    Lorena Watkins MD [facility] [phone]            See the Encounter Report to view Anticoagulation Flowsheet and Dosing Calendar (Go to Encounters tab in chart review, and find the Anticoagulation Therapy Visit)    Spoke with patient. Gave them their lab results and new warfarin recommendation.  No changes in health, medication, or diet. No missed doses, no falls. No signs or symptoms of bleed or clotting.   On  patient was given dosing for  by   Salazar. ASA was decreased to 81mgs daily.    Today we talked about what can make the INR go up or down and what that means. Discussed side effects and signs and symptoms to look for.,  Giovanni Ordonez [facility]    Also sent patient an e-mail with this information.

## 2019-02-21 LAB — INR PPP: 2.1

## 2019-02-22 ENCOUNTER — ANTICOAGULATION THERAPY VISIT (OUTPATIENT)
Dept: ANTICOAGULATION | Facility: CLINIC | Age: 64
End: 2019-02-22

## 2019-02-22 DIAGNOSIS — Z95.811 LVAD (LEFT VENTRICULAR ASSIST DEVICE) PRESENT (H): ICD-10-CM

## 2019-02-22 NOTE — PROGRESS NOTES
ANTICOAGULATION FOLLOW-UP CLINIC VISIT    Patient Name:  Danielito Chu  Date:  2019  Contact Type:  Telephone    SUBJECTIVE:     Patient Findings     Comments:   This result was reported by patient.  Lab was drawn yesterday.            OBJECTIVE    INR   Date Value Ref Range Status   2019 2.1  Final     Factor 2 Assay   Date Value Ref Range Status   10/27/2018 70 60 - 140 % Final     Comment:     The Factor 2 activity level is not a screening test for the Prothrombin 44267   mutation.         ASSESSMENT / PLAN  No question data found.  Anticoagulation Summary  As of 2019    INR goal:   2.0-3.0   TTR:   61.6 % (2 mo)   INR used for dosin.1 (2019)   Warfarin maintenance plan:   No maintenance plan   Full warfarin instructions:   : 4 mg; : 3 mg; : 3 mg; : 4 mg; : 3 mg; : 3 mg; : 3 mg   Plan last modified:   Kristie Fernandez RN (2018)   Next INR check:   3/1/2019   Priority:   INR   Target end date:   Indefinite    Indications    LVAD (left ventricular assist device) present (H) [Z95.811]             Anticoagulation Episode Summary     INR check location:       Preferred lab:       Send INR reminders to:   [facility]    Comments:   LVAD placed 18 ASA 81mg Daily Has Jantoven 2mg tablets  Lab# [phone]  Call pt at [phone].  Emphasize next INR date with patient. ++Send email each time++  Pt is home .      Anticoagulation Care Providers     Provider Role Specialty Phone number    Lorena Watkins MD [facility] Cardiology [phone]            See the Encounter Report to view Anticoagulation Flowsheet and Dosing Calendar (Go to Encounters tab in chart review, and find the Anticoagulation Therapy Visit)    Spoke with patient.     Bri Becerril RN

## 2019-03-01 ENCOUNTER — ANTICOAGULATION THERAPY VISIT (OUTPATIENT)
Dept: ANTICOAGULATION | Facility: CLINIC | Age: 64
End: 2019-03-01

## 2019-03-01 DIAGNOSIS — Z95.811 LVAD (LEFT VENTRICULAR ASSIST DEVICE) PRESENT (H): ICD-10-CM

## 2019-03-01 LAB — INR PPP: 1.9

## 2019-03-01 NOTE — PROGRESS NOTES
ANTICOAGULATION FOLLOW-UP CLINIC VISIT    Patient Name:  Danielito Chu  Date:  3/1/2019  Contact Type:  Telephone    SUBJECTIVE:     Patient Findings     Comments:   Unable to assess if pt took the correct dose of Warfarin was taken. Instructed pt to increase ASA to 325mg Daily per LVAD protocol            OBJECTIVE    INR   Date Value Ref Range Status   2019 1.9 (L) 2.0 - 3.5 Final     Factor 2 Assay   Date Value Ref Range Status   10/27/2018 70 60 - 140 % Final     Comment:     The Factor 2 activity level is not a screening test for the Prothrombin 92221   mutation.         ASSESSMENT / PLAN  INR assessment THER    Recheck INR In: 3 DAYS    INR Location Clinic      Anticoagulation Summary  As of 3/1/2019    INR goal:   2.0-3.0   TTR:   60.3 % (2.3 mo)   INR used for dosin.9! (3/1/2019)   Warfarin maintenance plan:   No maintenance plan   Full warfarin instructions:   3/1: 4 mg; 3/2: 4 mg; 3/3: 3 mg   Plan last modified:   Kristie Fernandez RN (2018)   Next INR check:   3/4/2019   Priority:   INR   Target end date:   Indefinite    Indications    LVAD (left ventricular assist device) present (H) [Z95.811]             Anticoagulation Episode Summary     INR check location:       Preferred lab:       Send INR reminders to:   [facility]    Comments:   LVAD placed 18 ASA 81mg Daily Has Jantoven 2mg tablets  Lab# [phone]  Call pt at [phone].  Emphasize next INR date with patient. ++Send email each time++  Pt is home .      Anticoagulation Care Providers     Provider Role Specialty Phone number    Lorena Watkins MD [facility] Cardiology [phone]            See the Encounter Report to view Anticoagulation Flowsheet and Dosing Calendar (Go to Encounters tab in chart review, and find the Anticoagulation Therapy Visit)    Left message for patient with results and dosing recommendations. Asked patient to call back to report any missed doses, falls, signs and symptoms  of bleeding or clotting, any changes in health, medication, or diet. Asked patient to call back with any questions or concerns.     Also e-mailed pt with the above instructions and doses    Kristie Fernandez RN

## 2019-03-04 ENCOUNTER — ANTICOAGULATION THERAPY VISIT (OUTPATIENT)
Dept: ANTICOAGULATION | Facility: CLINIC | Age: 64
End: 2019-03-04

## 2019-03-04 ENCOUNTER — CARE COORDINATION (OUTPATIENT)
Dept: CARDIOLOGY | Facility: CLINIC | Age: 64
End: 2019-03-04

## 2019-03-04 DIAGNOSIS — Z95.811 LVAD (LEFT VENTRICULAR ASSIST DEVICE) PRESENT (H): ICD-10-CM

## 2019-03-04 LAB — INR PPP: 3

## 2019-03-04 NOTE — PROGRESS NOTES
Called patient/caregiver to check in 10 weeks post discharge. Pt reports VAD parameters normal and weight stable. Reviewed medications and answered any questions. Patient reports sleeping fine and some anxiety since being home with LVAD. Patient is fullly able to move around the house and care for him/herself independently.     Discussed specific new problems/stressors since being discharged from the hospital: trouble getting [employer] to deliver due to them not having his signature on a form.  Empathized with patient and reviewed coping strategies: enlisting support from friends and love ones, attending patient and caregiver support groups, reviewing LVAD educational materials to reinforce knowledge, and talking about concerns with family/care providers/trusted others. Encouraged pt to page VAD Coordinator with any issues or questions. Pt verbalizes understanding.

## 2019-03-04 NOTE — PROGRESS NOTES
ANTICOAGULATION FOLLOW-UP CLINIC VISIT    Patient Name:  Danielito Chu  Date:  3/4/2019  Contact Type:  Telephone    SUBJECTIVE:     Patient Findings     Positives:   No Problem Findings, Unexplained INR or factor level change    Comments:   Instructions for pt to go back to ASA 81mg Daily per LVAD protocol            OBJECTIVE    INR   Date Value Ref Range Status   03/04/2019 3.0  Final     Factor 2 Assay   Date Value Ref Range Status   10/27/2018 70 60 - 140 % Final     Comment:     The Factor 2 activity level is not a screening test for the Prothrombin 17087   mutation.         ASSESSMENT / PLAN  INR assessment THER    Recheck INR In: 3 DAYS    INR Location Outside lab      Anticoagulation Summary  As of 3/4/2019    INR goal:   2.0-3.0   TTR:   61.5 % (2.4 mo)   INR used for dosing:   3.0 (3/4/2019)   Warfarin maintenance plan:   No maintenance plan   Full warfarin instructions:   3/4: 3 mg; 3/5: 4 mg; 3/6: 3 mg   Plan last modified:   Kristie Fernandez RN (12/20/2018)   Next INR check:   3/7/2019   Priority:   INR   Target end date:   Indefinite    Indications    LVAD (left ventricular assist device) present (H) [Z95.811]             Anticoagulation Episode Summary     INR check location:       Preferred lab:       Send INR reminders to:   [facility]    Comments:   LVAD placed 12/5/18 ASA 81mg Daily Has Jantoven 2mg tablets  Lab [phone]/[id number]  Call pt at [phone].  Emphasize next INR date with patient. ++Send email each time++  Pt is home .      Anticoagulation Care Providers     Provider Role Specialty Phone number    Lorena Watkins MD [facility] Cardiology [phone]            See the Encounter Report to view Anticoagulation Flowsheet and Dosing Calendar (Go to Encounters tab in chart review, and find the Anticoagulation Therapy Visit)    Spoke with patient. Gave them their lab results and new warfarin recommendation.  No changes in health, medication, or diet. No  missed doses, no falls. No signs or symptoms of bleed or clotting.     Also e-mailed pt with the above recommendations/return date     Kristie Fernandez RN

## 2019-03-06 DIAGNOSIS — G47.00 INSOMNIA, UNSPECIFIED TYPE: ICD-10-CM

## 2019-03-07 ENCOUNTER — CARE COORDINATION (OUTPATIENT)
Dept: CARDIOLOGY | Facility: CLINIC | Age: 64
End: 2019-03-07

## 2019-03-07 ENCOUNTER — ANTICOAGULATION THERAPY VISIT (OUTPATIENT)
Dept: ANTICOAGULATION | Facility: CLINIC | Age: 64
End: 2019-03-07

## 2019-03-07 DIAGNOSIS — Z95.811 LVAD (LEFT VENTRICULAR ASSIST DEVICE) PRESENT (H): ICD-10-CM

## 2019-03-07 DIAGNOSIS — Z95.811 LVAD (LEFT VENTRICULAR ASSIST DEVICE) PRESENT (H): Primary | ICD-10-CM

## 2019-03-07 LAB — INR PPP: 2.2

## 2019-03-07 RX ORDER — TRAZODONE HYDROCHLORIDE 100 MG/1
TABLET ORAL
Qty: 30 TABLET | Refills: 1 | Status: SHIPPED | OUTPATIENT
Start: 2019-03-07

## 2019-03-07 NOTE — PROGRESS NOTES
ANTICOAGULATION FOLLOW-UP CLINIC VISIT    Patient Name:  Danielito Chu  Date:  3/7/2019  Contact Type:  Telephone    SUBJECTIVE:     Patient Findings     Positives:   No Problem Findings           OBJECTIVE    INR   Date Value Ref Range Status   2019 2.2  Final     Factor 2 Assay   Date Value Ref Range Status   10/27/2018 70 60 - 140 % Final     Comment:     The Factor 2 activity level is not a screening test for the Prothrombin 81704   mutation.         ASSESSMENT / PLAN  No question data found.  Anticoagulation Summary  As of 3/7/2019    INR goal:   2.0-3.0   TTR:   63.1 % (2.5 mo)   INR used for dosin.2 (3/7/2019)   Warfarin maintenance plan:   No maintenance plan   Full warfarin instructions:   3/7: 4 mg; 3/8: 3 mg; 3/9: 4 mg; 3/10: 3 mg; 3/11: 3 mg; 3/12: 4 mg; 3/13: 3 mg   Plan last modified:   Kristie Fernandez RN (2018)   Next INR check:   3/14/2019   Priority:   INR   Target end date:   Indefinite    Indications    LVAD (left ventricular assist device) present (H) [Z95.811]             Anticoagulation Episode Summary     INR check location:       Preferred lab:       Send INR reminders to:   [facility]    Comments:   LVAD placed 18 ASA 81mg Daily Has Jantoven 2mg tablets  Lab [phone]/[phone]  Call pt at [phone].  Emphasize next INR date with patient. ++Send email each time++  Pt is home .      Anticoagulation Care Providers     Provider Role Specialty Phone number    Lorena Watkins MD [facility] Cardiology [phone]            See the Encounter Report to view Anticoagulation Flowsheet and Dosing Calendar (Go to Encounters tab in chart review, and find the Anticoagulation Therapy Visit)    Spoke with patient and email is sent.     Bri Becerril RN

## 2019-03-08 LAB — INR PPP: 2.2

## 2019-03-14 NOTE — PROGRESS NOTES
"Addendum 3/14/19 Pt unable to go in for an INR today due to weather. Pt will try to go in tomorrow, but if unable to will call us. Pt reports he is having some scant blood in his nasal mucous when he blows his nose. He reports in the past when this happens his INR's are usually close to 3.00 or higher. Pt reports that this is not a lot of blood, but just small amounts and he is thinking that his INR might be \"high.\" Writer instructed pt to take a 3mg dose tonight and will reassess tomorrow when INR is drawn. Pt communicated understanding. Kristie Fernandez RN    Addendum 3/15/19 Pt reports that the weather continues to be bad with icy roads. Writer instructed pt to take a 3mg 3/15, 4mg 3/16, & 3mg 3/17. Pt will get an INR on Monday 3/18. Updated calendar to reflect this. Pt communicated understanding. Kristie Fernandez RN  "

## 2019-03-18 ENCOUNTER — ANTICOAGULATION THERAPY VISIT (OUTPATIENT)
Dept: ANTICOAGULATION | Facility: CLINIC | Age: 64
End: 2019-03-18

## 2019-03-18 DIAGNOSIS — Z95.811 LVAD (LEFT VENTRICULAR ASSIST DEVICE) PRESENT (H): ICD-10-CM

## 2019-03-18 LAB — INR PPP: 2.6

## 2019-03-18 NOTE — PROGRESS NOTES
ANTICOAGULATION FOLLOW-UP CLINIC VISIT    Patient Name:  Danielito Chu  Date:  3/18/2019  Contact Type:  Telephone    SUBJECTIVE:     Patient Findings            OBJECTIVE    INR   Date Value Ref Range Status   2019 2.6  Final     Comment:     [facility]     Factor 2 Assay   Date Value Ref Range Status   10/27/2018 70 60 - 140 % Final     Comment:     The Factor 2 activity level is not a screening test for the Prothrombin 13520   mutation.         ASSESSMENT / PLAN  INR assessment THER    Recheck INR In: 1 WEEK    INR Location Outside lab      Anticoagulation Summary  As of 3/18/2019    INR goal:   2.0-3.0   TTR:   67.8 % (2.9 mo)   INR used for dosin.6 (3/18/2019)   Warfarin maintenance plan:   No maintenance plan   Full warfarin instructions:   3/18: 3 mg; 3/19: 4 mg; 3/20: 3 mg; 3/21: 3 mg; 3/22: 3 mg; 3/23: 4 mg; 3/24: 3 mg   Plan last modified:   Kristie Fernandez RN (2018)   Next INR check:   3/25/2019   Priority:   INR   Target end date:   Indefinite    Indications    LVAD (left ventricular assist device) present (H) [Z95.811]             Anticoagulation Episode Summary     INR check location:       Preferred lab:       Send INR reminders to:   [facility]    Comments:   LVAD placed 18 ASA 81mg Daily Has Jantoven 2mg tablets  Lab [phone]/[phone]  Call pt at [phone].  Emphasize next INR date with patient. ++Send email each time++  Pt is home .      Anticoagulation Care Providers     Provider Role Specialty Phone number    Lorena Watkins MD [facility] Cardiology [phone]            See the Encounter Report to view Anticoagulation Flowsheet and Dosing Calendar (Go to Encounters tab in chart review, and find the Anticoagulation Therapy Visit)    Spoke with patient. Gave them their lab results and new warfarin recommendation.  No changes in health, medication, or diet. No missed doses, no falls. No signs or symptoms of bleed or clotting.    Patient  had LVAD placed on:   12/5/18  Patient's current Aspirin dose: 81mg  LVAD Protocol followed:  Yes.   If Not Followed Explanation:  Fady Etienne, RN

## 2019-03-19 ENCOUNTER — CARE COORDINATION (OUTPATIENT)
Dept: CARDIOLOGY | Facility: CLINIC | Age: 64
End: 2019-03-19

## 2019-03-19 NOTE — PROGRESS NOTES
Called patient/caregiver to check in 12 weeks post discharge. Pt reports VAD parameters within normal limits and weight up 11 pounds in 6 weeks but no SOB or swelling. Reviewed medications and answered any questions. Patient reports sleeping fine and no anxiety since being home with LVAD. Patient is fully able to move around the house and care for him/herself indepently.     Discussed specific new problems/stressors since being discharged from the hospital: just anxious to get listed for transplant. Empathized with patient and reviewed coping strategies: enlisting support from friends and love ones, attending patient and caregiver support groups, reviewing LVAD educational materials to reinforce knowledge, and talking about concerns with family/care providers/trusted others. Encouraged pt to page VAD Coordinator with any issues or questions. Pt verbalizes understanding.

## 2019-03-22 ENCOUNTER — ANTICOAGULATION THERAPY VISIT (OUTPATIENT)
Dept: ANTICOAGULATION | Facility: CLINIC | Age: 64
End: 2019-03-22

## 2019-03-22 ENCOUNTER — OFFICE VISIT (OUTPATIENT)
Dept: CARDIOLOGY | Facility: CLINIC | Age: 64
End: 2019-03-22
Attending: INTERNAL MEDICINE
Payer: COMMERCIAL

## 2019-03-22 ENCOUNTER — ANCILLARY PROCEDURE (OUTPATIENT)
Dept: CARDIOLOGY | Facility: CLINIC | Age: 64
End: 2019-03-22
Payer: COMMERCIAL

## 2019-03-22 ENCOUNTER — OFFICE VISIT (OUTPATIENT)
Dept: CARDIOLOGY | Facility: CLINIC | Age: 64
End: 2019-03-22
Attending: NURSE PRACTITIONER
Payer: COMMERCIAL

## 2019-03-22 VITALS
WEIGHT: 207 LBS | HEIGHT: 69 IN | SYSTOLIC BLOOD PRESSURE: 64 MMHG | OXYGEN SATURATION: 97 % | WEIGHT: 207.9 LBS | SYSTOLIC BLOOD PRESSURE: 64 MMHG | OXYGEN SATURATION: 97 % | HEART RATE: 79 BPM | HEART RATE: 79 BPM | BODY MASS INDEX: 30.66 KG/M2 | BODY MASS INDEX: 30.79 KG/M2 | HEIGHT: 69 IN

## 2019-03-22 DIAGNOSIS — Z95.811 LVAD (LEFT VENTRICULAR ASSIST DEVICE) PRESENT (H): ICD-10-CM

## 2019-03-22 DIAGNOSIS — I50.22 CHRONIC SYSTOLIC HEART FAILURE (H): ICD-10-CM

## 2019-03-22 DIAGNOSIS — Z95.810 CARDIAC DEFIBRILLATOR IN SITU: ICD-10-CM

## 2019-03-22 DIAGNOSIS — Z95.811 LVAD (LEFT VENTRICULAR ASSIST DEVICE) PRESENT (H): Primary | ICD-10-CM

## 2019-03-22 LAB
ALBUMIN SERPL-MCNC: 4.1 G/DL (ref 3.4–5)
ALP SERPL-CCNC: 92 U/L (ref 40–150)
ALT SERPL W P-5'-P-CCNC: 18 U/L (ref 0–70)
AMPHETAMINES UR QL SCN: NEGATIVE
ANION GAP SERPL CALCULATED.3IONS-SCNC: 8 MMOL/L (ref 3–14)
AST SERPL W P-5'-P-CCNC: 19 U/L (ref 0–45)
BARBITURATES UR QL: NEGATIVE
BENZODIAZ UR QL: NEGATIVE
BILIRUB SERPL-MCNC: 0.6 MG/DL (ref 0.2–1.3)
BUN SERPL-MCNC: 22 MG/DL (ref 7–30)
CALCIUM SERPL-MCNC: 9.8 MG/DL (ref 8.5–10.1)
CANNABINOIDS UR QL SCN: NEGATIVE
CHLORIDE SERPL-SCNC: 96 MMOL/L (ref 94–109)
CO2 SERPL-SCNC: 28 MMOL/L (ref 20–32)
COCAINE UR QL: NEGATIVE
CREAT SERPL-MCNC: 1.06 MG/DL (ref 0.66–1.25)
D DIMER PPP FEU-MCNC: 1.9 UG/ML FEU (ref 0–0.5)
ERYTHROCYTE [DISTWIDTH] IN BLOOD BY AUTOMATED COUNT: 17.8 % (ref 10–15)
ETHANOL UR QL SCN: NEGATIVE
GFR SERPL CREATININE-BSD FRML MDRD: 74 ML/MIN/{1.73_M2}
GLUCOSE SERPL-MCNC: 112 MG/DL (ref 70–99)
HCT VFR BLD AUTO: 46.2 % (ref 40–53)
HGB BLD-MCNC: 14.7 G/DL (ref 13.3–17.7)
INR PPP: 2.31 (ref 0.86–1.14)
LDH SERPL L TO P-CCNC: 225 U/L (ref 85–227)
MCH RBC QN AUTO: 27.3 PG (ref 26.5–33)
MCHC RBC AUTO-ENTMCNC: 31.8 G/DL (ref 31.5–36.5)
MCV RBC AUTO: 86 FL (ref 78–100)
OPIATES UR QL SCN: NEGATIVE
PCP UR QL SCN: NEGATIVE
PLATELET # BLD AUTO: 340 10E9/L (ref 150–450)
POTASSIUM SERPL-SCNC: 4.1 MMOL/L (ref 3.4–5.3)
PROT SERPL-MCNC: 8.7 G/DL (ref 6.8–8.8)
RBC # BLD AUTO: 5.38 10E12/L (ref 4.4–5.9)
SODIUM SERPL-SCNC: 132 MMOL/L (ref 133–144)
WBC # BLD AUTO: 9.2 10E9/L (ref 4–11)

## 2019-03-22 PROCEDURE — 36415 COLL VENOUS BLD VENIPUNCTURE: CPT | Performed by: INTERNAL MEDICINE

## 2019-03-22 PROCEDURE — 80320 DRUG SCREEN QUANTALCOHOLS: CPT | Performed by: INTERNAL MEDICINE

## 2019-03-22 PROCEDURE — 85027 COMPLETE CBC AUTOMATED: CPT | Performed by: INTERNAL MEDICINE

## 2019-03-22 PROCEDURE — G0463 HOSPITAL OUTPT CLINIC VISIT: HCPCS | Mod: 25,ZF

## 2019-03-22 PROCEDURE — 93750 INTERROGATION VAD IN PERSON: CPT | Mod: ZF | Performed by: INTERNAL MEDICINE

## 2019-03-22 PROCEDURE — 80307 DRUG TEST PRSMV CHEM ANLYZR: CPT | Performed by: INTERNAL MEDICINE

## 2019-03-22 PROCEDURE — 80053 COMPREHEN METABOLIC PANEL: CPT | Performed by: INTERNAL MEDICINE

## 2019-03-22 PROCEDURE — 85379 FIBRIN DEGRADATION QUANT: CPT | Performed by: INTERNAL MEDICINE

## 2019-03-22 PROCEDURE — 99207 ZZC APP CREDIT; MD BILLING SHARED VISIT: CPT | Mod: ZP | Performed by: NURSE PRACTITIONER

## 2019-03-22 PROCEDURE — 83615 LACTATE (LD) (LDH) ENZYME: CPT | Performed by: INTERNAL MEDICINE

## 2019-03-22 PROCEDURE — 85610 PROTHROMBIN TIME: CPT | Performed by: INTERNAL MEDICINE

## 2019-03-22 PROCEDURE — 99214 OFFICE O/P EST MOD 30 MIN: CPT | Mod: 25 | Performed by: INTERNAL MEDICINE

## 2019-03-22 ASSESSMENT — PAIN SCALES - GENERAL: PAINLEVEL: NO PAIN (0)

## 2019-03-22 ASSESSMENT — MIFFLIN-ST. JEOR
SCORE: 1733.41
SCORE: 1729.33

## 2019-03-22 NOTE — PROGRESS NOTES
March 22, 2019      HPI:   Mr. Danielito Chu is a 63 yr old male with a history of NICM (LVEF at dong of 15%) s/p ICD (4/2017),  who underwent HM3 LVAD implant on 12/5/18 as DT (some history of mariajuana smoking, liver disease).     Hospital course was notable for postoperative flolan and pressor support but was ultimately weaned from this and has been doing better and better every week.     He has been walking longer and longer distances and has more energy than he has had in years.     He can walk several blocks without stopping.  No orthopnea, PND, chest pain, palpitations, swelling. Appetite is great.     No driveline erythema No fevers or chills. No bleeding. No focal deficits.      PAST MEDICAL HISTORY:  Past Medical History:   Diagnosis Date     Acute systolic congestive heart failure (H) 4/5/2017     Diabetes (H)      HTN (hypertension)      Hyperlipidemia      Liver disease      LVAD (left ventricular assist device) present (H)      3 12/18     Marijuana abuse      Mitral regurgitation      Nocturnal oxygen desaturation 3/29/2018     Nonischemic cardiomyopathy (H) 4/5/2017     SHIRLEY (obstructive sleep apnea)      Pacemaker 04/07/2017    ICD 4/7/17     Situational mixed anxiety and depressive disorder        FAMILY HISTORY:  Reviewed , no changes     SOCIAL HISTORY:  Social History     Reviewed, no changes       CURRENT MEDICATIONS:     Current Outpatient Medications   Medication Sig Dispense Refill     allopurinol (ZYLOPRIM) 100 MG tablet Take 2 tablets (200 mg) by mouth daily 60 tablet 1     artificial saliva (BIOTENE DRY MOUTHWASH) LIQD liquid Swish and spit 10 mLs in mouth 4 times daily as needed for dry mouth 59 mL 0     aspirin (ASA) 81 MG chewable tablet Take 1 tablet (81 mg) by mouth daily 120 tablet 0     Blood Glucose Monitoring Suppl (BLOOD GLUCOSE MONITOR SYSTEM) w/Device KIT three times a week       bumetanide (BUMEX) 1 MG tablet Take 2 mg in the am and 1 mg in the evening. 90 tablet 3      "colchicine (COLCYRS) 0.6 MG tablet Take 1 tablet (0.6 mg) by mouth 2 times daily 60 tablet 3     losartan (COZAAR) 25 MG tablet Take 1 tablet (25 mg) by mouth daily 30 tablet 3     metoprolol succinate ER (TOPROL XL) 25 MG 24 hr tablet Take 0.5 tablets (12.5 mg) by mouth daily 45 tablet 1     omeprazole (PRILOSEC) 20 MG DR capsule Take 1 capsule (20 mg) by mouth every morning (before breakfast) 90 capsule 3     potassium chloride ER (K-DUR/KLOR-CON M) 20 MEQ CR tablet Take 1 tablet (20 mEq) by mouth 2 times daily 90 tablet 3     spironolactone (ALDACTONE) 25 MG tablet Take 1.5 tablets (37.5 mg) by mouth daily 145 tablet 3     traZODone (DESYREL) 100 MG tablet TAKE 1 TABLET BY MOUTH EVERY DAY AT BEDTIME AS NEEDED FOR SLEEP 30 tablet 1     vitamin B1 (THIAMINE) 100 MG tablet Take 1 tablet (100 mg) by mouth daily 30 tablet 0     warfarin (COUMADIN) 2 MG tablet Take 3 mg PO daily at 6 pm until next INR check, then dose per INR goal 2-3 150 tablet 1     traZODone (DESYREL) 100 MG tablet Take 1 tablet (100 mg) by mouth nightly as needed for sleep 30 tablet 0         ROS:   Constitutional: No fever, chills, or sweats.  Weight has increased and he has gained back muscle  ENT: No visual disturbance, ear ache, epistaxis, sore throat.   Allergies/Immunologic: Negative.   Respiratory: No cough, hemoptysis.   Cardiovascular: As per HPI.   GI: No nausea, vomiting, hematemesis, melena, or hematochezia.   : No urinary frequency, dysuria, or hematuria.   Integument: Negative.   Psychiatric: As per HPI.  Neuro: Negative.   Endocrinology: Negative.   Musculoskeletal:gout is under control     EXAM:  BP (!) 64/0 (BP Location: Right arm, Patient Position: Chair, Cuff Size: Adult Regular)   Pulse 79   Ht 1.753 m (5' 9\")   Wt 94.3 kg (207 lb 14.4 oz)   SpO2 97%   BMI 30.70 kg/m     General: appears comfortable, alert and articulate  Head: normocephalic, atraumatic  Eyes: anicteric sclera, EOMI  Neck: no adenopathy  Orophyarynx: " moist mucosa, no lesions, dentition intact  Heart: regular, S1/S2, grade 2/6 JENNY best heard at LLSB, no gallop or rub, JVP is flat  Lungs: clear, no rales or wheezing  Abdomen: soft, non-tender, bowel sounds present, no hepatomegaly  Extremities: no clubbing, cyanosis or LE edema  Neurological: normal speech and affect, no gross motor deficits    VAD Interrogation: VAD interrogation reviewed with VAD coordinator. Agree with findings. No PI events, power spikes, speed drops, or other findings suspicious of pump malfunction noted.     Labs:  Reviewed INR, BMP, albumin     Diagnostics:  Last echo reviewed     Assessment and Plan:   Mr. Danielito Chu is a 63 yr old male with a history of NICM (LVEF at dong of 15%) s/p ICD (4/2017),  who underwent HM3 LVAD implant on 12/5/18 as DT ([facility], liver disease). He is overall doing extremely well.     Chronic systolic heart failure secondary to NICM  Stage D  NYHA Class II  ACEi/ARB:  Yes, losartan   BB: yes   Aldosterone antagonist: yes  SCD prophylaxis:  ICD  Fluid status:  euvolemic -  MAP: at goal of 65-80   Anti-coag goal: ASA 81, INR 2-3     Other:     NSVT controlled, has ICD, now on beta blocker     Transplant candidacy: I actually think he is an excellent transplant candidate.  He is so much better after his LVAD that I think he will do well with the transplant.  He is stronger than I have seen him in years.  We need to obtain some surveillance marijuana test and we are adding one on today.  He has not smoked any marijuana (which was only very rarely prior), since before his LVAD.  I will also have him see the liver team given his history of cirrhosis-I suspect this is better given his platelets and albumin and I think he will do very well after transplant-I suspect all of his liver abnormalities related to chronic congestion.  His BMI is within range.  We will be reaching out today to our transplant coordinators to get all of his testing  scheduled.    Return to clinic in 3 months-sooner if transplant testing warrants a sooner return    Lorena Watkins MD     RTC as previously scheduled       CC  Patient Care Team:  Bryan Orellana MD as PCP - General  Lorena Watkins MD as MD (Cardiology)  Deysi Mattson APRN CNP as Nurse Practitioner (Cardiology)  Candido Mendez MD as MD (Gastroenterology)  Edgardo Elias RN as VAD Coordinator (Cardiology)  Earlene Giron APRN CNP as Nurse Practitioner (Clinical Cardiac Electrophysiology)  Daniel Zavala MD as MD (Clinical Cardiac Electrophysiology)  SELF, REFERRED

## 2019-03-22 NOTE — NURSING NOTE
R waves measure 2.4 mV which remains stable post LVAD.    Vitals were taken and medications were reconciled.     Angie Mclean,RMA  1:48 PM

## 2019-03-22 NOTE — NURSING NOTE
"1). PUMP DATA  Primary controller serial number: [id number]     3:   Flow: 5.0 L/min,    Speed: 5500 RPMs,     PI: 2.6 ,  Power: 4.6 [place], Hct: 46    Primary controller   Back up battery: Patient use: 12, Replace in: 27  Months     Data downloaded: No   Equipment and driveline assessed for damage: Yes     Back up : Serial number:Patient forgot backup bag in car.  Re-educated patient on importance of keeping backup bag within arms distance. Pt verbalized understanding.     Education complete: Yes   Charge the BACKUP controller s backup battery every 6 months  Perform a self test on BACKUP every 6 months  Change the MPU s batteries every 6 months:Yes    2). ALARMS  Alarms reported by patient since last pump evaluation: Yes, low voltage alarms in the evenings. Patient reports that he promptly switches power sources.   Alarms or other finding noted during pump data history and alarm download: Yes, occasional PI events with rare speed drops associated with stacked PI events.    Action Taken:  Reviewed data with patient: Yes      3). DRESSING CHANGE / DRIVELINE ASSESSMENT  Dressing change completed today: No  Appearance of Driveline site: C/D/I per patient, no drainage, no tenderness, occasional \"scabs\" that come off but they are dry, no redness.     Driveline stabilization: Method: [employer]  [ Teaching reinforced on need for stabilization of Driveline. ]    4).Pt. Education    D:  Pt education provided.  I:  Pt s with [employer] educated on the following topics:  1. Reviewed Care of Batteries.  a. When to rotate.  b. When to calibrate.  c. When they .  d. When to clean Contacts.  2. Reviewed MPU   a. When to change batteries.  3. Reviewed Backup Controller  a. When to charge.  b. When to do self-test.  4. Inspected pt. s wearables  5. Pt given a handout with a Maintenance schedule.    I: Pt s with HVAD educated on the following topics:  1. Reviewed when to get new batteries after 500 " cycles.  2. Reviewed the functions of the scroll down button.  3. Inspected pt. s wearables.  4. Reviewed procedure for when to change batteries and pt given handout with instructions.  A:  Pt verbalized understanding.  P:  VAD Coordinator available for questions or concerns.  Will continue VAD education.

## 2019-03-22 NOTE — LETTER
3/22/2019      RE: Danielito Chu  [address] [place] [address]       Dear Colleague,    Thank you for the opportunity to participate in the care of your patient, Danielito Chu, at the [facility]. Please see a copy of my visit note below.      March 22, 2019      HPI:   Mr. Danielito Chu is a 63 yr old male with a history of NICM (LVEF at dong of 15%) s/p ICD (4/2017),  who underwent HM3 LVAD implant on 12/5/18 as DT ([facility], liver disease).     Hospital course was notable for postoperative flolan and pressor support but was ultimately weaned from this and has been doing better and better every week.     He has been walking longer and longer distances and has more energy than he has had in years.     He can walk several blocks without stopping.  No orthopnea, PND, chest pain, palpitations, swelling. Appetite is great.     No driveline erythema No fevers or chills. No bleeding. No focal deficits.      PAST MEDICAL HISTORY:  Past Medical History:   Diagnosis Date     Acute systolic congestive heart failure (H) 4/5/2017     Diabetes (H)      HTN (hypertension)      Hyperlipidemia      Liver disease      LVAD (left ventricular assist device) present (H)      3 12/18     Marijuana abuse      Mitral regurgitation      Nocturnal oxygen desaturation 3/29/2018     Nonischemic cardiomyopathy (H) 4/5/2017     SHIRLEY (obstructive sleep apnea)      Pacemaker 04/07/2017    ICD 4/7/17     Situational mixed anxiety and depressive disorder        FAMILY HISTORY:  Reviewed , no changes     SOCIAL HISTORY:  Social History     Reviewed, no changes       CURRENT MEDICATIONS:     Current Outpatient Medications   Medication Sig Dispense Refill     allopurinol (ZYLOPRIM) 100 MG tablet Take 2 tablets (200 mg) by mouth daily 60 tablet 1     artificial saliva (BIOTENE DRY MOUTHWASH) LIQD liquid Swish and spit 10 mLs in mouth 4 times  daily as needed for dry mouth 59 mL 0     aspirin (ASA) 81 MG chewable tablet Take 1 tablet (81 mg) by mouth daily 120 tablet 0     Blood Glucose Monitoring Suppl (BLOOD GLUCOSE MONITOR SYSTEM) w/Device KIT three times a week       bumetanide (BUMEX) 1 MG tablet Take 2 mg in the am and 1 mg in the evening. 90 tablet 3     colchicine (COLCYRS) 0.6 MG tablet Take 1 tablet (0.6 mg) by mouth 2 times daily 60 tablet 3     losartan (COZAAR) 25 MG tablet Take 1 tablet (25 mg) by mouth daily 30 tablet 3     metoprolol succinate ER (TOPROL XL) 25 MG 24 hr tablet Take 0.5 tablets (12.5 mg) by mouth daily 45 tablet 1     omeprazole (PRILOSEC) 20 MG DR capsule Take 1 capsule (20 mg) by mouth every morning (before breakfast) 90 capsule 3     potassium chloride ER (K-DUR/KLOR-CON M) 20 MEQ CR tablet Take 1 tablet (20 mEq) by mouth 2 times daily 90 tablet 3     spironolactone (ALDACTONE) 25 MG tablet Take 1.5 tablets (37.5 mg) by mouth daily 145 tablet 3     traZODone (DESYREL) 100 MG tablet TAKE 1 TABLET BY MOUTH EVERY DAY AT BEDTIME AS NEEDED FOR SLEEP 30 tablet 1     vitamin B1 (THIAMINE) 100 MG tablet Take 1 tablet (100 mg) by mouth daily 30 tablet 0     warfarin (COUMADIN) 2 MG tablet Take 3 mg PO daily at 6 pm until next INR check, then dose per INR goal 2-3 150 tablet 1     traZODone (DESYREL) 100 MG tablet Take 1 tablet (100 mg) by mouth nightly as needed for sleep 30 tablet 0         ROS:   Constitutional: No fever, chills, or sweats.  Weight has increased and he has gained back muscle  ENT: No visual disturbance, ear ache, epistaxis, sore throat.   Allergies/Immunologic: Negative.   Respiratory: No cough, hemoptysis.   Cardiovascular: As per HPI.   GI: No nausea, vomiting, hematemesis, melena, or hematochezia.   : No urinary frequency, dysuria, or hematuria.   Integument: Negative.   Psychiatric: As per HPI.  Neuro: Negative.   Endocrinology: Negative.   Musculoskeletal:gout is under control     EXAM:  BP (!) 64/0 (BP  "Location: Right arm, Patient Position: Chair, Cuff Size: Adult Regular)   Pulse 79   Ht 1.753 m (5' 9\")   Wt 94.3 kg (207 lb 14.4 oz)   SpO2 97%   BMI 30.70 kg/m      General: appears comfortable, alert and articulate  Head: normocephalic, atraumatic  Eyes: anicteric sclera, EOMI  Neck: no adenopathy  Orophyarynx: moist mucosa, no lesions, dentition intact  Heart: regular, S1/S2, grade 2/6 JENNY best heard at LLSB, no gallop or rub, JVP is flat  Lungs: clear, no rales or wheezing  Abdomen: soft, non-tender, bowel sounds present, no hepatomegaly  Extremities: no clubbing, cyanosis or LE edema  Neurological: normal speech and affect, no gross motor deficits    VAD Interrogation: VAD interrogation reviewed with VAD coordinator. Agree with findings. No PI events, power spikes, speed drops, or other findings suspicious of pump malfunction noted.     Labs:  Reviewed INR, BMP, albumin     Diagnostics:  Last echo reviewed     Assessment and Plan:   Mr. Danielito Chu is a 63 yr old male with a history of NICM (LVEF at dong of 15%) s/p ICD (4/2017),  who underwent HM3 LVAD implant on 12/5/18 as DT (some history of mariajuana smoking, liver disease). He is overall doing extremely well.     Chronic systolic heart failure secondary to NICM  Stage D  NYHA Class II  ACEi/ARB:  Yes, losartan   BB: yes   Aldosterone antagonist: yes  SCD prophylaxis:  ICD  Fluid status:  euvolemic -  MAP: at goal of 65-80   Anti-coag goal: ASA 81, INR 2-3     Other:     NSVT controlled, has ICD, now on beta blocker     Transplant candidacy: I actually think he is an excellent transplant candidate.  He is so much better after his LVAD that I think he will do well with the transplant.  He is stronger than I have seen him in years.  We need to obtain some surveillance marijuana test and we are adding one on today.  He has not smoked any marijuana (which was only very rarely prior), since before his LVAD.  I will also have him see the liver team " given his history of cirrhosis-I suspect this is better given his platelets and albumin and I think he will do very well after transplant-I suspect all of his liver abnormalities related to chronic congestion.  His BMI is within range.  We will be reaching out today to our transplant coordinators to get all of his testing scheduled.    Return to clinic in 3 months-sooner if transplant testing warrants a sooner return    RTC as previously scheduled     Lorena Watkins MD       CC  Patient Care Team:  Bryan Orellana MD as PCP - General  Lorena Watkins MD as MD (Cardiology)  Deysi Mattson APRN CNP as Nurse Practitioner (Cardiology)  Candido Mendez MD as MD (Gastroenterology)  Edgardo Elias RN as VAD Coordinator (Cardiology)  Earlene Giron APRN CNP as Nurse Practitioner (Clinical Cardiac Electrophysiology)  Daniel Zavala MD as MD (Clinical Cardiac Electrophysiology)  SELF, REFERRED

## 2019-03-22 NOTE — PATIENT INSTRUCTIONS
It was a pleasure to see you in clinic today.  Please do not hesitate to call with any questions or concerns.  We look forward to seeing you in clinic at your next device check in 3 months.    Earlene Nagy, RN, BSN  Electrophysiology Nurse Clinician  [facility]    During Business Hours Please Call:  [phone]  After Hours Please Call:  [phone] - select option #4 and ask for job code [phone]

## 2019-03-22 NOTE — NURSING NOTE
No chief complaint on file.    Vitals were taken and medications were reconciled.     Angie Mclean,RMA  1:55 PM

## 2019-03-22 NOTE — PATIENT INSTRUCTIONS
Medications:  1. No changes today.     Follow-up:  1. Come to clinic on June 7 for labs, device check and to see Dr. Watkins.      Instructions:  1. Keep up the great work! We will reach out to the Transplant Coordinator and she will reach out to you to see what labs/tests you need to do before getting on heart transplant list.     Page the VAD Coordinator on call if you gain more than 3 lb in a day or 5 in a week. Please also page if you feel unwell or have alarms.     Great to see you in clinic today. To Page the VAD Coordinator on call, dial [phone] option #4 and ask to speak to the VAD coordinator on call.

## 2019-03-22 NOTE — PROGRESS NOTES
Electrophysiology Clinic Note  HPI:   Mr. Chu is a 63 year old male who has a past medical history significant for NIDCM LVEF 15%, s/p ICD 4/7/17, s/p LVAD 12/5/18, mild-moderate MR, pre-DM, former tobacco use, untreated sleep apnea (central and obstructive), HTN, and hepatic congestion.     He has had HF for 10 years and has been on medications since then. He states he was doing well and out of the hospital for 8 years and then in had several hospitalizations for HF over the last couple years. ICD was discussed about 2 years ago with him and he never pursued it due to job changes at the time. He then presented to [facility] on 3/21/17 reporting progressive SOB, lower extremity weakness, weight gain, and orthopnea. He was found to have elevated BNP, slightly elevated LFTs, and CXR c/w CHF. He was started on IV lasix but did not diurese well with minimal UOP and was referred to [facility]. He went to cath lab for RHC/LHC which showed no significant CAD, elevated right and left sided filling pressures, moderate secondary pulmonary hypertension with a mean PAP of 48 mmHg, and decreased cardiac output. He was tuned up from a HF standpoint and then referred for ICD. He underwent ICD on 4/7/17. He did well until Summer 2018 and then had a couple admsision for HF and in 11/2018 was discharged home on milrinone gtt. He ultimately elected to undergo LVAD which he had placed on 12/5/18.      He presents today for follow up. He reports feeling well. He is recovering well after LVAD implant. He was noted to have decreased R waves post LVAD implant. R waves were measure 2.4 mV post LVAD and now up to 3.3 today. He denies any chest pain/pressures, dizziness, lightheadedness, worsening shortness of breath, leg/ankle swelling, PND, orthopnea, palpitations, or syncopal symptoms.  Device interrogation shows normal ICD function.  Since 1/15/19, there have been 10 NSVT and 12 monitored VT episodes recorded lasting between 12 sec to 9 minutes  in duration with rates from 160s to 190s. Available egms similar QRS to intrinsic with somewhat irregular R-R intervals at times. Likely representing short AF episodes. Intrinsic rhythm = VS @ 92 bpm.   = <1%.  No short v-v intervals recorded. He is also seeing  today. Current cardiac medications include: Bumex, Spironolactone, Losartan, ASA, Toprol XL, and Warfarin.         PAST MEDICAL HISTORY:  Past Medical History:   Diagnosis Date     Acute systolic congestive heart failure (H) 4/5/2017     Diabetes (H)      HTN (hypertension)      Hyperlipidemia      Liver disease      LVAD (left ventricular assist device) present (H)      3 12/18     Marijuana abuse      Mitral regurgitation      Nocturnal oxygen desaturation 3/29/2018     Nonischemic cardiomyopathy (H) 4/5/2017     SHIRLEY (obstructive sleep apnea)      Pacemaker 04/07/2017    ICD 4/7/17     Situational mixed anxiety and depressive disorder        CURRENT MEDICATIONS:  Current Outpatient Medications   Medication Sig Dispense Refill     allopurinol (ZYLOPRIM) 100 MG tablet Take 2 tablets (200 mg) by mouth daily 60 tablet 1     artificial saliva (BIOTENE DRY MOUTHWASH) LIQD liquid Swish and spit 10 mLs in mouth 4 times daily as needed for dry mouth 59 mL 0     aspirin (ASA) 81 MG chewable tablet Take 1 tablet (81 mg) by mouth daily 120 tablet 0     Blood Glucose Monitoring Suppl (BLOOD GLUCOSE MONITOR SYSTEM) w/Device KIT three times a week       bumetanide (BUMEX) 1 MG tablet Take 2 mg in the am and 1 mg in the evening. 90 tablet 3     colchicine (COLCYRS) 0.6 MG tablet Take 1 tablet (0.6 mg) by mouth 2 times daily 60 tablet 3     losartan (COZAAR) 25 MG tablet Take 1 tablet (25 mg) by mouth daily 30 tablet 3     metoprolol succinate ER (TOPROL XL) 25 MG 24 hr tablet Take 0.5 tablets (12.5 mg) by mouth daily 45 tablet 1     omeprazole (PRILOSEC) 20 MG DR capsule Take 1 capsule (20 mg) by mouth every morning (before breakfast) (Patient not taking:  Reported on 2019) 90 capsule 3     potassium chloride ER (K-DUR/KLOR-CON M) 20 MEQ CR tablet Take 1 tablet (20 mEq) by mouth 2 times daily 90 tablet 3     spironolactone (ALDACTONE) 25 MG tablet Take 1.5 tablets (37.5 mg) by mouth daily 145 tablet 3     traZODone (DESYREL) 100 MG tablet TAKE 1 TABLET BY MOUTH EVERY DAY AT BEDTIME AS NEEDED FOR SLEEP 30 tablet 1     traZODone (DESYREL) 100 MG tablet Take 1 tablet (100 mg) by mouth nightly as needed for sleep 30 tablet 0     vitamin B1 (THIAMINE) 100 MG tablet Take 1 tablet (100 mg) by mouth daily 30 tablet 0     warfarin (COUMADIN) 2 MG tablet Take 3 mg PO daily at 6 pm until next INR check, then dose per INR goal 2-3 150 tablet 1       PAST SURGICAL HISTORY:  Past Surgical History:   Procedure Laterality Date     ESOPHAGOSCOPY, GASTROSCOPY, DUODENOSCOPY (EGD), COMBINED N/A 2018    Procedure: COMBINED ESOPHAGOSCOPY, GASTROSCOPY, DUODENOSCOPY (EGD);  Surgeon: Candido Mendez MD;  Location:  GI     IMPLANT IMPLANTABLE CARDIOVERTER DEFIBRILLATOR       INSERT VENTRICULAR ASSIST DEVICE LEFT (HEARTMATE II/III) MINIMALLY INVASIVE N/A 2018    Procedure: Partial Median Sternotomy, Left Thoracotomy, Minimally Invasive Left Ventricular Assist Device Placement (Heartmate III), On Cardiopulmonary Bypass;  Surgeon: Andrei Silva MD;  Location:  OR       ALLERGIES:   No Known Allergies    FAMILY HISTORY:  Family History   Problem Relation Age of Onset     Heart Failure Father          at age 86     Heart Failure Paternal Uncle          at 66      Myocardial Infarction Paternal Uncle          at age 62     Coronary Artery Disease Mother          during CABG at age 41       SOCIAL HISTORY:  Social History     Tobacco Use     Smoking status: Former Smoker     Smokeless tobacco: Never Used     Tobacco comment: quit 3/2017   Substance Use Topics     Alcohol use: No     Frequency: Never     Comment: social     Drug use: No       ROS:  "  A comprehensive 10 point review of systems negative other than as mentioned in HPI.  Exam:  BP (!) 64/0 (BP Location: Right arm, Patient Position: Chair, Cuff Size: Adult Regular)   Pulse 79   Ht 1.753 m (5' 9\")   Wt 93.9 kg (207 lb)   SpO2 97%   BMI 30.57 kg/m    GENERAL APPEARANCE: alert and no distress  HEENT: no icterus, no xanthelasmas, normal pupil size and reaction, normal palate, mucosa moist, no central cyanosis  NECK: no adenopathy, no asymmetry, masses, or scars, thyroid normal to palpation and no bruits, JVP not elevated  RESPIRATORY: lungs clear to auscultation - no rales, rhonchi or wheezes, no use of accessory muscles, no retractions, respirations are unlabored, normal respiratory rate  CARDIOVASCULAR: LVAD Hum, regular.   ABDOMEN: soft, non tender, bowel sounds normal, non-distended,drive line site covered CDI.  EXTREMITIES: peripheral pulses normal, no edema  NEURO: alert and oriented to person/place/time, normal speech, gait and affect  SKIN: no ecchymoses, no rashes  PSYCH: normal affect, cooperative    Labs:  Reviewed.     Testing/Procedures:  PULMONARY FUNCTION TESTS:   PFT Latest Ref Rng & Units 10/24/2018   FVC L 3.21   FEV1 L 2.47   FVC% % 79   FEV1% % 78         1/2019 ECHOCARDIOGRAM:   Interpretation Summary  LVAD ramp study with settings ranging from 5300 to 5600 rpm. Please refer to  the EPIC report for measurements performed at different LVAD speed settings.     Baseline speed was 5500 rpm. At baseline setting there was moderate left  ventricular dilatation with LVIDd 6.4 cm. Right ventricular dilatation was  moderate and right ventricular systolic function appears mild-moderately  reduced. Ventricular septum deviates to the right.  Limited Doppler of inflow/outflow cannula.  Aortic valve does not open.  Mild to moderate aortic insufficiency is continuous and worse compared to  study done 12/11/18.      Assessment and Plan:   Mr. Chu is a 63 year old male who has a past medical " history significant for NID LVEF 15%, s/p ICD 4/7/17, s/p LVAD 12/5/18, mild-moderate MR, pre-DM, former tobacco use, untreated sleep apnea (central and obstructive), HTN, and hepatic congestion. He has had HF for 10 years and has been on medications since then. He underwent ICD on 4/7/17. He did well until Summer 2018 and then had a couple admsision for HF and in 11/2018 was discharged home on milrinone gtt. He ultimately elected to undergo LVAD which he had placed on 12/5/18. He presents today for follow up. He reports feeling well. He is recovering well after LVAD implant. He was noted to have decreased R waves post LVAD implant. R waves were measure 2.4 mV post LVAD and now up to 3.3 today. Otherwise, device interrogation shows normal ICD function.  Since 1/15/19, there have been 10 NSVT and 12 monitored VT episodes recorded lasting between 12 sec to 9 minutes in duration with rates from 160s to 190s. Available egms similar QRS to intrinsic with somewhat irregular R-R intervals at times. Likely representing short AF episodes. These are asymptomatic and he is anticoagulated. Intrinsic rhythm = VS @ 92 bpm.   = <1%.  No short v-v intervals recorded. He is also seeing  today. Current cardiac medications include: Bumex, Spironolactone, Losartan, ASA, Toprol XL, and Warfarin.     NICM LVEF 15%,s/p LVAD 12/5/18:  1. ACEi/ARB: Continue Losartan.  2. BB: Continue  Metoprolol  3. Aldosterone antagonist: Continue spironolactone.  4. SCD prophylaxis: s/p ICD on 4/7/17. Post LVAD he has had small R waves measuring 2.4-3.3. We will keep an eye on these for now and not pursue lead revision. If they start to decline further, then we would need to consider revising lead. Otherwise, if they remain stable at current measurements or improve, we will leave it alone and monitor. We will have him send a remote transmission in a couple months to monitor.   5. Fluid status: euvolemic, continue Bumex    He will continue  to follow with device clinic and LVAD HF team per routine. He can follow up with EP on an as needed basis.     The patient states understanding and is agreeable with plan.   JOSE A Diaz CNP  Pager: [phone]  ARAM AVELAR

## 2019-03-22 NOTE — PROGRESS NOTES
ANTICOAGULATION FOLLOW-UP CLINIC VISIT    Patient Name:  Danielito Chu  Date:  3/22/2019  Contact Type:  Telephone    SUBJECTIVE:     Patient Findings            OBJECTIVE    INR   Date Value Ref Range Status   2019 2.31 (H) 0.86 - 1.14 Final     Factor 2 Assay   Date Value Ref Range Status   10/27/2018 70 60 - 140 % Final     Comment:     The Factor 2 activity level is not a screening test for the Prothrombin 45550   mutation.         ASSESSMENT / PLAN  INR assessment THER    Recheck INR In: 1 WEEK    INR Location Clinic      Anticoagulation Summary  As of 3/22/2019    INR goal:   2.0-3.0   TTR:   69.4 % (3 mo)   INR used for dosin.31 (3/22/2019)   Warfarin maintenance plan:   No maintenance plan   Full warfarin instructions:   3/22: 3 mg; 3/23: 4 mg; 3/24: 3 mg; 3/25: 3 mg; 3/26: 4 mg; 3/27: 3 mg; 3/28: 3 mg   Plan last modified:   Kristie Fernandez RN (2018)   Next INR check:   3/29/2019   Priority:   INR   Target end date:   Indefinite    Indications    LVAD (left ventricular assist device) present (H) [Z95.811]             Anticoagulation Episode Summary     INR check location:       Preferred lab:       Send INR reminders to:   [facility]    Comments:   LVAD placed 18 ASA 81mg Daily Has Jantoven 2mg tablets  Lab [phone]/[phone]  Call pt at [phone].  Emphasize next INR date with patient. ++Send email each time++  Pt is home .      Anticoagulation Care Providers     Provider Role Specialty Phone number    Lorena Watkins MD [facility] Cardiology [phone]            See the Encounter Report to view Anticoagulation Flowsheet and Dosing Calendar (Go to Encounters tab in chart review, and find the Anticoagulation Therapy Visit)    Spoke with patient. Gave them their lab results and new warfarin recommendation.  No changes in health, medication, or diet. No missed doses, no falls. No signs or symptoms of bleed or clotting.    Patient had LVAD placed  on:   12/5/18  Patient's current Aspirin dose: 81mg  LVAD Protocol followed:  Yes.   If Not Followed Explanation:  Fady Etienne, RN

## 2019-03-25 LAB
MDC_IDC_LEAD_IMPLANT_DT: NORMAL
MDC_IDC_LEAD_LOCATION: NORMAL
MDC_IDC_LEAD_LOCATION_DETAIL_1: NORMAL
MDC_IDC_LEAD_MFG: NORMAL
MDC_IDC_LEAD_MODEL: NORMAL
MDC_IDC_LEAD_POLARITY_TYPE: NORMAL
MDC_IDC_LEAD_SERIAL: NORMAL
MDC_IDC_MSMT_BATTERY_DTM: NORMAL
MDC_IDC_MSMT_BATTERY_REMAINING_LONGEVITY: 144 MO
MDC_IDC_MSMT_BATTERY_STATUS: NORMAL
MDC_IDC_MSMT_CAP_CHARGE_DTM: NORMAL
MDC_IDC_MSMT_CAP_CHARGE_ENERGY: 0 J
MDC_IDC_MSMT_CAP_CHARGE_TIME: 0 S
MDC_IDC_MSMT_CAP_CHARGE_TIME: 10.2
MDC_IDC_MSMT_CAP_CHARGE_TYPE: NORMAL
MDC_IDC_MSMT_CAP_CHARGE_TYPE: NORMAL
MDC_IDC_MSMT_LEADCHNL_RV_IMPEDANCE_VALUE: 421 OHM
MDC_IDC_MSMT_LEADCHNL_RV_PACING_THRESHOLD_AMPLITUDE: 1.3 V
MDC_IDC_MSMT_LEADCHNL_RV_PACING_THRESHOLD_PULSEWIDTH: 0.5 MS
MDC_IDC_MSMT_LEADCHNL_RV_SENSING_INTR_AMPL: 3.3 MV
MDC_IDC_PG_IMPLANT_DTM: NORMAL
MDC_IDC_PG_MFG: NORMAL
MDC_IDC_PG_MODEL: NORMAL
MDC_IDC_PG_SERIAL: NORMAL
MDC_IDC_PG_TYPE: NORMAL
MDC_IDC_SESS_CLINIC_NAME: NORMAL
MDC_IDC_SESS_DTM: NORMAL
MDC_IDC_SESS_TYPE: NORMAL
MDC_IDC_SET_BRADY_HYSTRATE: NORMAL
MDC_IDC_SET_BRADY_LOWRATE: 40 {BEATS}/MIN
MDC_IDC_SET_BRADY_MODE: NORMAL
MDC_IDC_SET_LEADCHNL_RV_PACING_AMPLITUDE: 3 V
MDC_IDC_SET_LEADCHNL_RV_PACING_ANODE_ELECTRODE_1: NORMAL
MDC_IDC_SET_LEADCHNL_RV_PACING_ANODE_LOCATION_1: NORMAL
MDC_IDC_SET_LEADCHNL_RV_PACING_CAPTURE_MODE: NORMAL
MDC_IDC_SET_LEADCHNL_RV_PACING_CATHODE_ELECTRODE_1: NORMAL
MDC_IDC_SET_LEADCHNL_RV_PACING_CATHODE_LOCATION_1: NORMAL
MDC_IDC_SET_LEADCHNL_RV_PACING_POLARITY: NORMAL
MDC_IDC_SET_LEADCHNL_RV_PACING_PULSEWIDTH: 0.5 MS
MDC_IDC_SET_LEADCHNL_RV_SENSING_ADAPTATION_MODE: NORMAL
MDC_IDC_SET_LEADCHNL_RV_SENSING_ANODE_ELECTRODE_1: NORMAL
MDC_IDC_SET_LEADCHNL_RV_SENSING_ANODE_LOCATION_1: NORMAL
MDC_IDC_SET_LEADCHNL_RV_SENSING_CATHODE_ELECTRODE_1: NORMAL
MDC_IDC_SET_LEADCHNL_RV_SENSING_CATHODE_LOCATION_1: NORMAL
MDC_IDC_SET_LEADCHNL_RV_SENSING_POLARITY: NORMAL
MDC_IDC_SET_LEADCHNL_RV_SENSING_SENSITIVITY: 0.6 MV
MDC_IDC_SET_ZONE_DETECTION_INTERVAL: 250 MS
MDC_IDC_SET_ZONE_DETECTION_INTERVAL: 300 MS
MDC_IDC_SET_ZONE_DETECTION_INTERVAL: 375 MS
MDC_IDC_SET_ZONE_TYPE: NORMAL
MDC_IDC_SET_ZONE_VENDOR_TYPE: NORMAL
MDC_IDC_STAT_BRADY_RV_PERCENT_PACED: 1 %
MDC_IDC_STAT_EPISODE_RECENT_COUNT: 10
MDC_IDC_STAT_EPISODE_RECENT_COUNT: 12
MDC_IDC_STAT_EPISODE_RECENT_COUNT_DTM_END: NORMAL
MDC_IDC_STAT_EPISODE_RECENT_COUNT_DTM_END: NORMAL
MDC_IDC_STAT_EPISODE_RECENT_COUNT_DTM_START: NORMAL
MDC_IDC_STAT_EPISODE_RECENT_COUNT_DTM_START: NORMAL
MDC_IDC_STAT_EPISODE_TOTAL_COUNT: 12
MDC_IDC_STAT_EPISODE_TOTAL_COUNT: 12
MDC_IDC_STAT_EPISODE_TOTAL_COUNT: 296
MDC_IDC_STAT_EPISODE_TOTAL_COUNT_DTM_END: NORMAL
MDC_IDC_STAT_EPISODE_TYPE: NORMAL
MDC_IDC_STAT_EPISODE_VENDOR_TYPE: NORMAL
MDC_IDC_STAT_TACHYTHERAPY_ATP_DELIVERED_RECENT: 0
MDC_IDC_STAT_TACHYTHERAPY_ATP_DELIVERED_TOTAL: 0
MDC_IDC_STAT_TACHYTHERAPY_RECENT_DTM_END: NORMAL
MDC_IDC_STAT_TACHYTHERAPY_RECENT_DTM_START: NORMAL
MDC_IDC_STAT_TACHYTHERAPY_SHOCKS_ABORTED_RECENT: 0
MDC_IDC_STAT_TACHYTHERAPY_SHOCKS_ABORTED_TOTAL: 0
MDC_IDC_STAT_TACHYTHERAPY_SHOCKS_DELIVERED_RECENT: 0
MDC_IDC_STAT_TACHYTHERAPY_SHOCKS_DELIVERED_TOTAL: 0
MDC_IDC_STAT_TACHYTHERAPY_TOTAL_DTM_END: NORMAL

## 2019-03-27 ENCOUNTER — CARE COORDINATION (OUTPATIENT)
Dept: CARDIOLOGY | Facility: CLINIC | Age: 64
End: 2019-03-27

## 2019-03-27 NOTE — PROGRESS NOTES
Called patient/caregiver to check in 12 weeks post discharge. Pt reports VAD parameters normal and weight unchanged. Reviewed medications and answered any questions. Patient reports sleeping OK and some anxiety since being home with LVAD. Fabiano has seen a psychologist recently to talk about his anxiety. Patient is fully able to move around the house and care for him/herself independently.    Discussed specific new problems/stressors since being discharged from the hospital: none today. Empathized with patient and reviewed coping strategies: enlisting support from friends and love ones, attending patient and caregiver support groups, reviewing LVAD educational materials to reinforce knowledge, and talking about concerns with family/care providers/trusted others. Encouraged pt to page VAD Coordinator with any issues or questions. Pt verbalizes understanding.

## 2019-03-28 ENCOUNTER — CARE COORDINATION (OUTPATIENT)
Dept: CARDIOLOGY | Facility: CLINIC | Age: 64
End: 2019-03-28

## 2019-03-28 DIAGNOSIS — Z95.811 LVAD (LEFT VENTRICULAR ASSIST DEVICE) PRESENT (H): Primary | ICD-10-CM

## 2019-03-28 DIAGNOSIS — I50.23 ACUTE ON CHRONIC SYSTOLIC HEART FAILURE (H): ICD-10-CM

## 2019-03-28 RX ORDER — ALLOPURINOL 100 MG/1
TABLET ORAL
Qty: 60 TABLET | Refills: 1 | Status: CANCELLED | OUTPATIENT
Start: 2019-03-28

## 2019-03-28 NOTE — PROGRESS NOTES
D: Fabiano called in to say he has had an episode or two of standing up quickly and having dizziness. His PI jumps up form 2.9 to 5.9 for a few seconds before returning to baseline. The VAD flow is baseline 4.8 and the power 4.4. In cardiac rehab lately, the MAP has been 70-80. His weight is 200lbs up from his discharge weight of 178 back in January. The weight has been a slow climb that Fabiano attributes to table weight and building muscle. He has no SOB.  I: I suggested to Fabiano that he should stand up a little slower--first put his feet on the floor, sit up, and then stand up.  A: BP is within normal limits and fluid status appears euvolemic.  P: Following up on last week's clinic visit, Hepatology GI consult ordered per Dr. Watkins as part of evaluating for transplant listing. Random drug testing. Arrangements being made for Fabiano to travel to [place] at the end of April.

## 2019-03-29 ENCOUNTER — ANTICOAGULATION THERAPY VISIT (OUTPATIENT)
Dept: ANTICOAGULATION | Facility: CLINIC | Age: 64
End: 2019-03-29

## 2019-03-29 DIAGNOSIS — Z95.811 LVAD (LEFT VENTRICULAR ASSIST DEVICE) PRESENT (H): ICD-10-CM

## 2019-03-29 LAB — INR PPP: 2.6

## 2019-03-29 NOTE — PROGRESS NOTES
ANTICOAGULATION FOLLOW-UP CLINIC VISIT    Patient Name:  Danielito Chu  Date:  3/29/2019  Contact Type:  Telephone    SUBJECTIVE:     Patient Findings            OBJECTIVE    INR   Date Value Ref Range Status   2019 2.60  Final     Comment:     Outside Lab      Factor 2 Assay   Date Value Ref Range Status   10/27/2018 70 60 - 140 % Final     Comment:     The Factor 2 activity level is not a screening test for the Prothrombin 08590   mutation.         ASSESSMENT / PLAN  INR assessment THER    Recheck INR In: 2 WEEKS    INR Location Outside lab      Anticoagulation Summary  As of 3/29/2019    INR goal:   2.0-3.0   TTR:   71.5 % (3.2 mo)   INR used for dosin.60 (3/29/2019)   Warfarin maintenance plan:   4 mg (2 mg x 2) every Tue, Sat; 3 mg (2 mg x 1.5) all other days   Full warfarin instructions:   4 mg every Tue, Sat; 3 mg all other days   Weekly warfarin total:   23 mg   Plan last modified:   Kristie Fernandez RN (3/29/2019)   Next INR check:   2019   Priority:   INR   Target end date:   Indefinite    Indications    LVAD (left ventricular assist device) present (H) [Z95.811]             Anticoagulation Episode Summary     INR check location:       Preferred lab:       Send INR reminders to:   [facility]    Comments:   LVAD placed 18 ASA 81mg Daily Has Jantoven 2mg tablets  Lab [phone]/[id number]  Call pt at [phone].  Emphasize next INR date with patient. ++Send email each time++  Pt is home .      Anticoagulation Care Providers     Provider Role Specialty Phone number    Lorena Watkins MD [facility] Cardiology [phone]            See the Encounter Report to view Anticoagulation Flowsheet and Dosing Calendar (Go to Encounters tab in chart review, and find the Anticoagulation Therapy Visit)    Spoke with patient. Gave them their lab results and new warfarin recommendation.  No changes in health, medication, or diet. No missed doses, no falls. No signs or  symptoms of bleed or clotting.     Pt got VAD implanted on 12/5/18 and is ok to go 2 weeks between labs per LVAD protocol     Kristie Fernandez RN

## 2019-04-01 DIAGNOSIS — I50.23 ACUTE ON CHRONIC SYSTOLIC HEART FAILURE (H): ICD-10-CM

## 2019-04-02 RX ORDER — ALLOPURINOL 100 MG/1
200 TABLET ORAL DAILY
Qty: 60 TABLET | Refills: 1 | Status: SHIPPED | OUTPATIENT
Start: 2019-04-02 | End: 2019-04-04

## 2019-04-03 ENCOUNTER — CARE COORDINATION (OUTPATIENT)
Dept: CARDIOLOGY | Facility: CLINIC | Age: 64
End: 2019-04-03

## 2019-04-03 DIAGNOSIS — I50.20 SYSTOLIC HEART FAILURE (H): Primary | ICD-10-CM

## 2019-04-03 NOTE — PROGRESS NOTES
"Called patient to check in 16 weeks post discharge. Pt reports VAD parameters \"normal\" and weight \"stable\". Reviewed medications and answered any questions. Patient reports sleeping fine and no anxiety since being home with LVAD. Patient is fully able to move around the house and care for him self independently.     Discussed specific new problems/stressors since being discharged from the hospital: none now but eager to get on the transplant list. Empathized with patient and reviewed coping strategies: enlisting support from friends and love ones, attending patient and caregiver support groups, reviewing LVAD educational materials to reinforce knowledge, and talking about concerns with family/care providers/trusted others. Encouraged pt to page VAD Coordinator with any issues or questions. Pt verbalizes understanding.    "

## 2019-04-03 NOTE — PROGRESS NOTES
Outpatient Referral-Heart Transplant    Referral was made by Dr. Watkins for Heart Transplant. Patient's chart reviewed and plan made for testing to be done prior to June visit.  Patient requesting to come back after his vacation in May.      VAD Info:  Implant Date:    VAD Coordinator:  Asim SONI:  June    Transplant Eval:  Started   Lead for Eval: Ashley  Orders Placed: No    Diagnosis:  ABO: O   BMI:                     RHC:  Needs to be repeated  Colonoscopy: Repeat in 3 years (2021)  Dexa:  Need scheduled  Mammogram/OBGYN: N/A  Dentist: Cleared prior to VAD  Vaccinations: Will review  Previous Chest Surgery: VAD        Smoking Status: Quit   Nicotine Products:None                  ETOH/Recreation Drug Use:  +THC prior to VAD- negative since November        Social History:  Has S.O.  Financial concerns: Not discussed      Plan: Come in after May 8th, patient is traveling.

## 2019-04-04 DIAGNOSIS — I50.23 ACUTE ON CHRONIC SYSTOLIC HEART FAILURE (H): ICD-10-CM

## 2019-04-04 RX ORDER — ALLOPURINOL 100 MG/1
200 TABLET ORAL DAILY
Qty: 60 TABLET | Refills: 1 | Status: SHIPPED | OUTPATIENT
Start: 2019-04-04 | End: 2019-08-15

## 2019-04-05 DIAGNOSIS — I50.22 CHRONIC SYSTOLIC CONGESTIVE HEART FAILURE (H): ICD-10-CM

## 2019-04-05 DIAGNOSIS — Z95.811 LVAD (LEFT VENTRICULAR ASSIST DEVICE) PRESENT (H): Primary | ICD-10-CM

## 2019-04-05 PROBLEM — I50.20 SYSTOLIC HEART FAILURE (H): Status: ACTIVE | Noted: 2019-04-05

## 2019-04-12 NOTE — PROGRESS NOTES
Addendum 4/12/19    Spoke with Fabiano today. Bad weather and bad roads prevents him from getting labs done today, He will stay on his current warfarin dose and go to lab on Monday 4/15.    Chandra Ordonez [facility]

## 2019-04-15 ENCOUNTER — TELEPHONE (OUTPATIENT)
Dept: CARDIOLOGY | Facility: CLINIC | Age: 64
End: 2019-04-15

## 2019-04-15 ENCOUNTER — ANTICOAGULATION THERAPY VISIT (OUTPATIENT)
Dept: ANTICOAGULATION | Facility: CLINIC | Age: 64
End: 2019-04-15

## 2019-04-15 DIAGNOSIS — Z95.811 LVAD (LEFT VENTRICULAR ASSIST DEVICE) PRESENT (H): ICD-10-CM

## 2019-04-15 LAB — INR PPP: 2.2 (ref 0.9–1.1)

## 2019-04-15 NOTE — TELEPHONE ENCOUNTER
[facility] Call Center    Phone Message    May a detailed message be left on voicemail: yes    Reason for Call: Other: Pt calling to speak with the  Kalli. Pt said that Asim called him and told him to speak with Kalli about scheduling an appt today. Please give pt a call back to schedule appt.      Action Taken: Message routed to:  [facility] Cardiology

## 2019-04-15 NOTE — PROGRESS NOTES
ANTICOAGULATION FOLLOW-UP CLINIC VISIT    Patient Name:  Danielito Chu  Date:  4/15/2019  Contact Type:  Telephone    SUBJECTIVE:     Patient Findings     Comments:   Fabiano reports no changes in health, diet, medications.  No missed doses of warfarin.  He has an appt at the [facility] on 19 and will recheck INR at that time.           OBJECTIVE    INR   Date Value Ref Range Status   04/15/2019 2.2 (A) 0.9 - 1.1 Final     Factor 2 Assay   Date Value Ref Range Status   10/27/2018 70 60 - 140 % Final     Comment:     The Factor 2 activity level is not a screening test for the Prothrombin 46734   mutation.         ASSESSMENT / PLAN  INR assessment THER    Recheck INR In: 1 WEEK    INR Location Outside lab      Anticoagulation Summary  As of 4/15/2019    INR goal:   2.0-3.0   TTR:   75.7 % (3.8 mo)   INR used for dosin.2 (4/15/2019)   Warfarin maintenance plan:   4 mg (2 mg x 2) every Tue, Sat; 3 mg (2 mg x 1.5) all other days   Full warfarin instructions:   4 mg every Tue, Sat; 3 mg all other days   Weekly warfarin total:   23 mg   No change documented:   Jeanette Bryant RN   Plan last modified:   Kristie Fernandez, RN (3/29/2019)   Next INR check:   2019   Priority:   INR   Target end date:   Indefinite    Indications    LVAD (left ventricular assist device) present (H) [Z95.811]             Anticoagulation Episode Summary     INR check location:       Preferred lab:       Send INR reminders to:   [facility]    Comments:   LVAD placed 18 ASA 81mg Daily Has Jantoven 2mg tablets  Lab [phone]/[phone]  Call pt at [phone].  Emphasize next INR date with patient. ++Send email each time++  Pt is home .      Anticoagulation Care Providers     Provider Role Specialty Phone number    Lorena Watkins MD Responsible Cardiology [phone]            See the Encounter Report to view Anticoagulation Flowsheet and Dosing Calendar (Go to Encounters tab in chart review, and find  the Anticoagulation Therapy Visit)    Spoke with Fabiano.  Fabiano reports no changes in health, diet, medications.  No missed doses of warfarin.  He has an appt at the [facility] on 4/22/19 and will recheck INR at that time.    Jeanette Bryant RN

## 2019-04-15 NOTE — TELEPHONE ENCOUNTER
Health Call Center    Phone Message    May a detailed message be left on voicemail: yes    Reason for Call: Other: Fabiano would like to schedule an appointment with his VAD coord.  He is concerned about a possible infection.  He reports some discharge.     Action Taken: Message routed to:  [facility] clinic coord cardiology

## 2019-04-19 ENCOUNTER — CARE COORDINATION (OUTPATIENT)
Dept: TRANSPLANT | Facility: CLINIC | Age: 64
End: 2019-04-19

## 2019-04-19 DIAGNOSIS — K76.9 LIVER DISEASE: Primary | ICD-10-CM

## 2019-04-19 NOTE — PROGRESS NOTES
Patient Update 04/17/19    Reviewed patient's chart as transplant evaluation has not been scheduled.  Notified scheduling team and supervisor.      04/19/19  Called patient as he had not been scheduled for evaluation and apologized for the delay and confirmed we are working on getting things arranged for him.    Patient coming for Hepatology on 4/22/19 and is out of town from April 27th-May 8th, plan for eval in the middle of May.

## 2019-04-22 ENCOUNTER — OFFICE VISIT (OUTPATIENT)
Dept: GASTROENTEROLOGY | Facility: CLINIC | Age: 64
End: 2019-04-22
Attending: INTERNAL MEDICINE
Payer: COMMERCIAL

## 2019-04-22 ENCOUNTER — ANTICOAGULATION THERAPY VISIT (OUTPATIENT)
Dept: ANTICOAGULATION | Facility: CLINIC | Age: 64
End: 2019-04-22

## 2019-04-22 ENCOUNTER — ALLIED HEALTH/NURSE VISIT (OUTPATIENT)
Dept: CARDIOLOGY | Facility: CLINIC | Age: 64
End: 2019-04-22
Attending: INTERNAL MEDICINE
Payer: COMMERCIAL

## 2019-04-22 VITALS — BODY MASS INDEX: 32.58 KG/M2 | WEIGHT: 220 LBS | HEIGHT: 69 IN

## 2019-04-22 VITALS
HEART RATE: 41 BPM | BODY MASS INDEX: 31.71 KG/M2 | WEIGHT: 221.5 LBS | OXYGEN SATURATION: 98 % | HEIGHT: 70 IN | TEMPERATURE: 97.3 F

## 2019-04-22 DIAGNOSIS — Z95.811 LVAD (LEFT VENTRICULAR ASSIST DEVICE) PRESENT (H): Primary | ICD-10-CM

## 2019-04-22 DIAGNOSIS — K76.9 LIVER DISEASE: ICD-10-CM

## 2019-04-22 DIAGNOSIS — Z95.811 LVAD (LEFT VENTRICULAR ASSIST DEVICE) PRESENT (H): ICD-10-CM

## 2019-04-22 DIAGNOSIS — I50.22 CHRONIC SYSTOLIC CONGESTIVE HEART FAILURE (H): ICD-10-CM

## 2019-04-22 LAB
ALBUMIN SERPL-MCNC: 3.6 G/DL (ref 3.4–5)
ALP SERPL-CCNC: 87 U/L (ref 40–150)
ALT SERPL W P-5'-P-CCNC: 21 U/L (ref 0–70)
ANION GAP SERPL CALCULATED.3IONS-SCNC: 8 MMOL/L (ref 3–14)
AST SERPL W P-5'-P-CCNC: 22 U/L (ref 0–45)
BILIRUB DIRECT SERPL-MCNC: 0.2 MG/DL (ref 0–0.2)
BILIRUB SERPL-MCNC: 0.5 MG/DL (ref 0.2–1.3)
BUN SERPL-MCNC: 20 MG/DL (ref 7–30)
CALCIUM SERPL-MCNC: 9.4 MG/DL (ref 8.5–10.1)
CHLORIDE SERPL-SCNC: 98 MMOL/L (ref 94–109)
CO2 SERPL-SCNC: 29 MMOL/L (ref 20–32)
CREAT SERPL-MCNC: 1.07 MG/DL (ref 0.66–1.25)
ERYTHROCYTE [DISTWIDTH] IN BLOOD BY AUTOMATED COUNT: 18.6 % (ref 10–15)
GFR SERPL CREATININE-BSD FRML MDRD: 74 ML/MIN/{1.73_M2}
GLUCOSE SERPL-MCNC: 116 MG/DL (ref 70–99)
HCT VFR BLD AUTO: 49.3 % (ref 40–53)
HGB BLD-MCNC: 15.7 G/DL (ref 13.3–17.7)
INR PPP: 1.56 (ref 0.86–1.14)
MCH RBC QN AUTO: 27.7 PG (ref 26.5–33)
MCHC RBC AUTO-ENTMCNC: 31.8 G/DL (ref 31.5–36.5)
MCV RBC AUTO: 87 FL (ref 78–100)
PLATELET # BLD AUTO: 317 10E9/L (ref 150–450)
POTASSIUM SERPL-SCNC: 4 MMOL/L (ref 3.4–5.3)
PROT SERPL-MCNC: 8.2 G/DL (ref 6.8–8.8)
RBC # BLD AUTO: 5.67 10E12/L (ref 4.4–5.9)
SODIUM SERPL-SCNC: 135 MMOL/L (ref 133–144)
WBC # BLD AUTO: 9.2 10E9/L (ref 4–11)

## 2019-04-22 PROCEDURE — 80076 HEPATIC FUNCTION PANEL: CPT | Performed by: INTERNAL MEDICINE

## 2019-04-22 PROCEDURE — 36415 COLL VENOUS BLD VENIPUNCTURE: CPT | Performed by: INTERNAL MEDICINE

## 2019-04-22 PROCEDURE — 85610 PROTHROMBIN TIME: CPT | Performed by: INTERNAL MEDICINE

## 2019-04-22 PROCEDURE — 85027 COMPLETE CBC AUTOMATED: CPT | Performed by: INTERNAL MEDICINE

## 2019-04-22 PROCEDURE — 80048 BASIC METABOLIC PNL TOTAL CA: CPT | Performed by: INTERNAL MEDICINE

## 2019-04-22 PROCEDURE — G0463 HOSPITAL OUTPT CLINIC VISIT: HCPCS | Mod: ZF

## 2019-04-22 ASSESSMENT — MIFFLIN-ST. JEOR
SCORE: 1788.29
SCORE: 1807

## 2019-04-22 ASSESSMENT — PAIN SCALES - GENERAL
PAINLEVEL: NO PAIN (0)
PAINLEVEL: NO PAIN (0)

## 2019-04-22 NOTE — LETTER
4/22/2019      RE: Danielito Chu  [id number] [place] [address]       I had the pleasure of seeing Danielito Chu for consultation in the Liver Clinic at the [facility] on 04/22/2019.  Mr. Chu presents for consultation regarding a question of cirrhosis.      He does have biventricular failure and had an LVAD placed last November.  At the time of that evaluation, he was admitted to the hospital and underwent a transjugular biopsy which showed a little information liver, but reportedly stage III-IV fibrosis.  There was an attempt at measuring his portal pressures, but the result was not believable as it came back at 70.  He did have an upper GI endoscopy, which showed no esophageal varices.      He actually feels quite well at this visit.  His quality of life has improved dramatically on the LVAD and he is anxious to get a heart transplant.      He denies any abdominal pain, itching or skin rash.  He has improving fatigue.  He denies any increased abdominal girth or lower extremity edema.  He denies any fevers or chills, cough or shortness of breath.  He has improving dyspnea on exertion.  He denies any nausea, vomiting, diarrhea or constipation.  He has not had any gastrointestinal bleeding or any overt signs of hepatic encephalopathy.  He states his appetite is good, and his weight is increasing ever so slightly.      PAST MEDICAL HISTORY:  His past medical history is significant mostly for cardiac issues.  He had defibrillator placed at one time as I mentioned, and now he is on the LVAD and has done well.      SOCIAL HISTORY:  He does not use any alcohol or tobacco.      FAMILY HISTORY:  Negative for liver disease.      REVIEW OF SYSTEMS:  A complete review of systems is negative other than that noted above.       Current Outpatient Medications   Medication     allopurinol (ZYLOPRIM) 100 MG tablet     aspirin (ASA) 81 MG chewable tablet     Blood Glucose Monitoring Suppl  "(BLOOD GLUCOSE MONITOR SYSTEM) w/Device KIT     bumetanide (BUMEX) 1 MG tablet     colchicine (COLCYRS) 0.6 MG tablet     hydrocortisone (CORTAID) 1 % external cream     losartan (COZAAR) 25 MG tablet     melatonin 3 MG tablet     metoprolol succinate ER (TOPROL XL) 25 MG 24 hr tablet     potassium chloride ER (K-DUR/KLOR-CON M) 20 MEQ CR tablet     predniSONE (DELTASONE) 10 MG tablet     senna-docusate (SENOKOT-S/PERICOLACE) 8.6-50 MG tablet     spironolactone (ALDACTONE) 25 MG tablet     traZODone (DESYREL) 100 MG tablet     vitamin B1 (THIAMINE) 100 MG tablet     warfarin (COUMADIN) 2 MG tablet     artificial saliva (BIOTENE DRY MOUTHWASH) LIQD liquid     omeprazole (PRILOSEC) 20 MG DR capsule     No current facility-administered medications for this visit.      Pulse (!) 41   Temp 97.3  F (36.3  C) (Oral)   Ht 1.772 m (5' 9.75\")   Wt 100.5 kg (221 lb 8 oz)   SpO2 98%   BMI 32.01 kg/m       In general he looks very well.  HEENT exam shows no scleral icterus or temporal muscle wasting.  His neck is without thyromegaly.  His chest is clear.  His cardiac exam shows obviously no heart sounds.  Abdominal exam shows no increase in abdominal girth.  His liver is 10 cm in span without left lobe enlargement.  No spleen tip is palpable.  Extremity exam shows no edema.  Skin exam shows no stigmata of chronic liver disease.  Neurologic exam shows no asterixis.       Recent Results (from the past 168 hour(s))   CBC with platelets    Collection Time: 04/22/19 11:28 AM   Result Value Ref Range    WBC 9.2 4.0 - 11.0 10e9/L    RBC Count 5.67 4.4 - 5.9 10e12/L    Hemoglobin 15.7 13.3 - 17.7 g/dL    Hematocrit 49.3 40.0 - 53.0 %    MCV 87 78 - 100 fl    MCH 27.7 26.5 - 33.0 pg    MCHC 31.8 31.5 - 36.5 g/dL    RDW 18.6 (H) 10.0 - 15.0 %    Platelet Count 317 150 - 450 10e9/L   Basic metabolic panel    Collection Time: 04/22/19 11:28 AM   Result Value Ref Range    Sodium 135 133 - 144 mmol/L    Potassium 4.0 3.4 - 5.3 mmol/L    " Chloride 98 94 - 109 mmol/L    Carbon Dioxide 29 20 - 32 mmol/L    Anion Gap 8 3 - 14 mmol/L    Glucose 116 (H) 70 - 99 mg/dL    Urea Nitrogen 20 7 - 30 mg/dL    Creatinine 1.07 0.66 - 1.25 mg/dL    GFR Estimate 74 >60 mL/min/[1.73_m2]    GFR Estimate If Black 85 >60 mL/min/[1.73_m2]    Calcium 9.4 8.5 - 10.1 mg/dL   Hepatic panel    Collection Time: 04/22/19 11:28 AM   Result Value Ref Range    Bilirubin Direct 0.2 0.0 - 0.2 mg/dL    Bilirubin Total 0.5 0.2 - 1.3 mg/dL    Albumin 3.6 3.4 - 5.0 g/dL    Protein Total 8.2 6.8 - 8.8 g/dL    Alkaline Phosphatase 87 40 - 150 U/L    ALT 21 0 - 70 U/L    AST 22 0 - 45 U/L   INR    Collection Time: 04/22/19 11:28 AM   Result Value Ref Range    INR 1.56 (H) 0.86 - 1.14      My impression is that Mr. Chu is very unlikely to have cirrhosis and more probably portal hypertension.  The reason why I say this is that his platelet count is completely normal.  He had a normal upper GI endoscopy, even at a time when I was concerned about more advanced fibrosis.  I suspect that now that his right ventricle is loaded with the LVAD that what fibrosis was there could have even resolved.  I certainly think he is an excellent candidate for transplantation given how normal his liver tests are and the lack of complications of any liver disease.        If you have any questions regarding this recommendation, please do not hesitate to contact me.       Brenden Dempsey MD      Professor of Medicine  [employer]      Executive Medical Director, Solid Organ Transplant Program  [facility]    Brenden Dempsey MD

## 2019-04-22 NOTE — NURSING NOTE
Vitals completed successfully and medication reconciled.     Monet Saravia CMA  11:39 AM  Chief Complaint   Patient presents with     Follow Up     VAD visit

## 2019-04-22 NOTE — NURSING NOTE
"Chief Complaint   Patient presents with     Consult     Liver disease      Pulse (!) 41   Temp 97.3  F (36.3  C) (Oral)   Ht 1.772 m (5' 9.75\")   Wt 100.5 kg (221 lb 8 oz)   SpO2 98%   BMI 32.01 kg/m    Angelica Goldstein MA    "

## 2019-04-22 NOTE — PROGRESS NOTES
I had the pleasure of seeing Danielito Chu for consultation in the Liver Clinic at the [facility] on 04/22/2019.  Mr. Chu presents for consultation regarding a question of cirrhosis.      He does have biventricular failure and had an LVAD placed last November.  At the time of that evaluation, he was admitted to the hospital and underwent a transjugular biopsy which showed a little information liver, but reportedly stage III-IV fibrosis.  There was an attempt at measuring his portal pressures, but the result was not believable as it came back at 70.  He did have an upper GI endoscopy, which showed no esophageal varices.      He actually feels quite well at this visit.  His quality of life has improved dramatically on the LVAD and he is anxious to get a heart transplant.      He denies any abdominal pain, itching or skin rash.  He has improving fatigue.  He denies any increased abdominal girth or lower extremity edema.  He denies any fevers or chills, cough or shortness of breath.  He has improving dyspnea on exertion.  He denies any nausea, vomiting, diarrhea or constipation.  He has not had any gastrointestinal bleeding or any overt signs of hepatic encephalopathy.  He states his appetite is good, and his weight is increasing ever so slightly.      PAST MEDICAL HISTORY:  His past medical history is significant mostly for cardiac issues.  He had defibrillator placed at one time as I mentioned, and now he is on the LVAD and has done well.      SOCIAL HISTORY:  He does not use any alcohol or tobacco.      FAMILY HISTORY:  Negative for liver disease.      REVIEW OF SYSTEMS:  A complete review of systems is negative other than that noted above.       Current Outpatient Medications   Medication     allopurinol (ZYLOPRIM) 100 MG tablet     aspirin (ASA) 81 MG chewable tablet     Blood Glucose Monitoring Suppl (BLOOD GLUCOSE MONITOR SYSTEM) w/Device KIT     bumetanide (BUMEX) 1 MG tablet      "colchicine (COLCYRS) 0.6 MG tablet     hydrocortisone (CORTAID) 1 % external cream     losartan (COZAAR) 25 MG tablet     melatonin 3 MG tablet     metoprolol succinate ER (TOPROL XL) 25 MG 24 hr tablet     potassium chloride ER (K-DUR/KLOR-CON M) 20 MEQ CR tablet     predniSONE (DELTASONE) 10 MG tablet     senna-docusate (SENOKOT-S/PERICOLACE) 8.6-50 MG tablet     spironolactone (ALDACTONE) 25 MG tablet     traZODone (DESYREL) 100 MG tablet     vitamin B1 (THIAMINE) 100 MG tablet     warfarin (COUMADIN) 2 MG tablet     artificial saliva (BIOTENE DRY MOUTHWASH) LIQD liquid     omeprazole (PRILOSEC) 20 MG DR capsule     No current facility-administered medications for this visit.      Pulse (!) 41   Temp 97.3  F (36.3  C) (Oral)   Ht 1.772 m (5' 9.75\")   Wt 100.5 kg (221 lb 8 oz)   SpO2 98%   BMI 32.01 kg/m      In general he looks very well.  HEENT exam shows no scleral icterus or temporal muscle wasting.  His neck is without thyromegaly.  His chest is clear.  His cardiac exam shows obviously no heart sounds.  Abdominal exam shows no increase in abdominal girth.  His liver is 10 cm in span without left lobe enlargement.  No spleen tip is palpable.  Extremity exam shows no edema.  Skin exam shows no stigmata of chronic liver disease.  Neurologic exam shows no asterixis.       Recent Results (from the past 168 hour(s))   CBC with platelets    Collection Time: 04/22/19 11:28 AM   Result Value Ref Range    WBC 9.2 4.0 - 11.0 10e9/L    RBC Count 5.67 4.4 - 5.9 10e12/L    Hemoglobin 15.7 13.3 - 17.7 g/dL    Hematocrit 49.3 40.0 - 53.0 %    MCV 87 78 - 100 fl    MCH 27.7 26.5 - 33.0 pg    MCHC 31.8 31.5 - 36.5 g/dL    RDW 18.6 (H) 10.0 - 15.0 %    Platelet Count 317 150 - 450 10e9/L   Basic metabolic panel    Collection Time: 04/22/19 11:28 AM   Result Value Ref Range    Sodium 135 133 - 144 mmol/L    Potassium 4.0 3.4 - 5.3 mmol/L    Chloride 98 94 - 109 mmol/L    Carbon Dioxide 29 20 - 32 mmol/L    Anion Gap 8 3 - " 14 mmol/L    Glucose 116 (H) 70 - 99 mg/dL    Urea Nitrogen 20 7 - 30 mg/dL    Creatinine 1.07 0.66 - 1.25 mg/dL    GFR Estimate 74 >60 mL/min/[1.73_m2]    GFR Estimate If Black 85 >60 mL/min/[1.73_m2]    Calcium 9.4 8.5 - 10.1 mg/dL   Hepatic panel    Collection Time: 04/22/19 11:28 AM   Result Value Ref Range    Bilirubin Direct 0.2 0.0 - 0.2 mg/dL    Bilirubin Total 0.5 0.2 - 1.3 mg/dL    Albumin 3.6 3.4 - 5.0 g/dL    Protein Total 8.2 6.8 - 8.8 g/dL    Alkaline Phosphatase 87 40 - 150 U/L    ALT 21 0 - 70 U/L    AST 22 0 - 45 U/L   INR    Collection Time: 04/22/19 11:28 AM   Result Value Ref Range    INR 1.56 (H) 0.86 - 1.14      My impression is that Mr. Chu is very unlikely to have cirrhosis and more probably portal hypertension.  The reason why I say this is that his platelet count is completely normal.  He had a normal upper GI endoscopy, even at a time when I was concerned about more advanced fibrosis.  I suspect that now that his right ventricle is loaded with the LVAD that what fibrosis was there could have even resolved.  I certainly think he is an excellent candidate for transplantation given how normal his liver tests are and the lack of complications of any liver disease.        If you have any questions regarding this recommendation, please do not hesitate to contact me.       Brenden Dempsey MD      Professor of Medicine  [employer]      Executive Medical Director, Solid Organ Transplant Program  [facility]

## 2019-04-22 NOTE — LETTER
4/22/2019       RE: Danielito Chu  [address] [place] [address]     Dear Colleague,    Thank you for referring your patient, Danielito Chu, to the [facility] HEPATOLOGY at [facility]. Please see a copy of my visit note below.    I had the pleasure of seeing Danielito Chu for consultation in the Liver Clinic at the [facility] on 04/22/2019.  Mr. Chu presents for consultation regarding a question of cirrhosis.      He does have biventricular failure and had an LVAD placed last November.  At the time of that evaluation, he was admitted to the hospital and underwent a transjugular biopsy which showed a little information liver, but reportedly stage III-IV fibrosis.  There was an attempt at measuring his portal pressures, but the result was not believable as it came back at 70.  He did have an upper GI endoscopy, which showed no esophageal varices.      He actually feels quite well at this visit.  His quality of life has improved dramatically on the LVAD and he is anxious to get a heart transplant.      He denies any abdominal pain, itching or skin rash.  He has improving fatigue.  He denies any increased abdominal girth or lower extremity edema.  He denies any fevers or chills, cough or shortness of breath.  He has improving dyspnea on exertion.  He denies any nausea, vomiting, diarrhea or constipation.  He has not had any gastrointestinal bleeding or any overt signs of hepatic encephalopathy.  He states his appetite is good, and his weight is increasing ever so slightly.      PAST MEDICAL HISTORY:  His past medical history is significant mostly for cardiac issues.  He had defibrillator placed at one time as I mentioned, and now he is on the LVAD and has done well.      SOCIAL HISTORY:  He does not use any alcohol or tobacco.      FAMILY HISTORY:  Negative for liver disease.      REVIEW OF SYSTEMS:  A complete review of systems is  "negative other than that noted above.       Current Outpatient Medications   Medication     allopurinol (ZYLOPRIM) 100 MG tablet     aspirin (ASA) 81 MG chewable tablet     Blood Glucose Monitoring Suppl (BLOOD GLUCOSE MONITOR SYSTEM) w/Device KIT     bumetanide (BUMEX) 1 MG tablet     colchicine (COLCYRS) 0.6 MG tablet     hydrocortisone (CORTAID) 1 % external cream     losartan (COZAAR) 25 MG tablet     melatonin 3 MG tablet     metoprolol succinate ER (TOPROL XL) 25 MG 24 hr tablet     potassium chloride ER (K-DUR/KLOR-CON M) 20 MEQ CR tablet     predniSONE (DELTASONE) 10 MG tablet     senna-docusate (SENOKOT-S/PERICOLACE) 8.6-50 MG tablet     spironolactone (ALDACTONE) 25 MG tablet     traZODone (DESYREL) 100 MG tablet     vitamin B1 (THIAMINE) 100 MG tablet     warfarin (COUMADIN) 2 MG tablet     artificial saliva (BIOTENE DRY MOUTHWASH) LIQD liquid     omeprazole (PRILOSEC) 20 MG DR capsule     No current facility-administered medications for this visit.      Pulse (!) 41   Temp 97.3  F (36.3  C) (Oral)   Ht 1.772 m (5' 9.75\")   Wt 100.5 kg (221 lb 8 oz)   SpO2 98%   BMI 32.01 kg/m       In general he looks very well.  HEENT exam shows no scleral icterus or temporal muscle wasting.  His neck is without thyromegaly.  His chest is clear.  His cardiac exam shows obviously no heart sounds.  Abdominal exam shows no increase in abdominal girth.  His liver is 10 cm in span without left lobe enlargement.  No spleen tip is palpable.  Extremity exam shows no edema.  Skin exam shows no stigmata of chronic liver disease.  Neurologic exam shows no asterixis.       Recent Results (from the past 168 hour(s))   CBC with platelets    Collection Time: 04/22/19 11:28 AM   Result Value Ref Range    WBC 9.2 4.0 - 11.0 10e9/L    RBC Count 5.67 4.4 - 5.9 10e12/L    Hemoglobin 15.7 13.3 - 17.7 g/dL    Hematocrit 49.3 40.0 - 53.0 %    MCV 87 78 - 100 fl    MCH 27.7 26.5 - 33.0 pg    MCHC 31.8 31.5 - 36.5 g/dL    RDW 18.6 (H) 10.0 " - 15.0 %    Platelet Count 317 150 - 450 10e9/L   Basic metabolic panel    Collection Time: 04/22/19 11:28 AM   Result Value Ref Range    Sodium 135 133 - 144 mmol/L    Potassium 4.0 3.4 - 5.3 mmol/L    Chloride 98 94 - 109 mmol/L    Carbon Dioxide 29 20 - 32 mmol/L    Anion Gap 8 3 - 14 mmol/L    Glucose 116 (H) 70 - 99 mg/dL    Urea Nitrogen 20 7 - 30 mg/dL    Creatinine 1.07 0.66 - 1.25 mg/dL    GFR Estimate 74 >60 mL/min/[1.73_m2]    GFR Estimate If Black 85 >60 mL/min/[1.73_m2]    Calcium 9.4 8.5 - 10.1 mg/dL   Hepatic panel    Collection Time: 04/22/19 11:28 AM   Result Value Ref Range    Bilirubin Direct 0.2 0.0 - 0.2 mg/dL    Bilirubin Total 0.5 0.2 - 1.3 mg/dL    Albumin 3.6 3.4 - 5.0 g/dL    Protein Total 8.2 6.8 - 8.8 g/dL    Alkaline Phosphatase 87 40 - 150 U/L    ALT 21 0 - 70 U/L    AST 22 0 - 45 U/L   INR    Collection Time: 04/22/19 11:28 AM   Result Value Ref Range    INR 1.56 (H) 0.86 - 1.14      My impression is that Mr. Chu is very unlikely to have cirrhosis and more probably portal hypertension.  The reason why I say this is that his platelet count is completely normal.  He had a normal upper GI endoscopy, even at a time when I was concerned about more advanced fibrosis.  I suspect that now that his right ventricle is loaded with the LVAD that what fibrosis was there could have even resolved.  I certainly think he is an excellent candidate for transplantation given how normal his liver tests are and the lack of complications of any liver disease.        If you have any questions regarding this recommendation, please do not hesitate to contact me.       Brenden Dempsey MD      Professor of Medicine  [employer]      Executive Medical Director, Solid Organ Transplant Program  [facility]

## 2019-04-22 NOTE — PROGRESS NOTES
ANTICOAGULATION FOLLOW-UP CLINIC VISIT    Patient Name:  Danielito Chu  Date:  2019  Contact Type:  Telephone    SUBJECTIVE:     Patient Findings     Positives:   Missed doses (missed warfarin dose  or )    Comments:   INR today is 1.56.  Fabiano thinks he might have missed a dose of warfarin either  or .    Discussed with LVAD coordinator, Asim, who spoke with Dr. Watkins.  She would like him to bridge with lovenox.  Cr cl:  83.4, pt. Wt:  100.5 kg;  lovenox dose is 100 mg BID until INR is therapeutic.  He will also increase his ASA dose to 325 mg daily until INR is therapeutic.  Fabiano is planning to go to [place], leaving 19 - 19.  He will be flying.             OBJECTIVE    INR   Date Value Ref Range Status   2019 1.56 (H) 0.86 - 1.14 Final     Factor 2 Assay   Date Value Ref Range Status   10/27/2018 70 60 - 140 % Final     Comment:     The Factor 2 activity level is not a screening test for the Prothrombin 75919   mutation.         ASSESSMENT / PLAN  INR assessment SUB    Recheck INR In: 2 DAYS    INR Location Clinic      Anticoagulation Summary  As of 2019    INR goal:   2.0-3.0   TTR:   73.0 % (4 mo)   INR used for dosin.56! (2019)   Warfarin maintenance plan:   4 mg (2 mg x 2) every Tue, Sat; 3 mg (2 mg x 1.5) all other days   Full warfarin instructions:   : 5 mg; : 5 mg; Otherwise 4 mg every Tue, Sat; 3 mg all other days   Weekly warfarin total:   23 mg   Plan last modified:   Kristie Fernandez, RN (3/29/2019)   Next INR check:   2019   Priority:   INR   Target end date:   Indefinite    Indications    LVAD (left ventricular assist device) present (H) [Z95.811]             Anticoagulation Episode Summary     INR check location:       Preferred lab:       Send INR reminders to:   [facility]    Comments:   LVAD placed 18 ASA 81mg Daily Has Jantoven 2mg tablets  Lab [phone]/[phone]  Call pt at [phone].  Emphasize  next INR date with patient. ++Send email each time++  Pt is home .      Anticoagulation Care Providers     Provider Role Specialty Phone number    Lorena Watkins MD Responsible Cardiology [phone]            See the Encounter Report to view Anticoagulation Flowsheet and Dosing Calendar (Go to Encounters tab in chart review, and find the Anticoagulation Therapy Visit)      INR today is 1.56.  Fabiano thinks he might have missed a dose of warfarin either 4/20 or 4/21.    Discussed with LVAD coordinator, Asim, who spoke with Dr. Watkins.  She would like him to bridge with lovenox.  Cr cl:  83.4, pt. Wt:  100.5 kg;  lovenox dose is 100 mg BID until INR is therapeutic. Went over how to give the injection--will also ask pharmacist to go over it with him.  He will also increase his ASA dose to 325 mg daily until INR is therapeutic.  Fabiano is planning to go to [place], leaving 4/27/19 - 5/8/19.  He will be flying.      RX for enoxaparin 100 mg BID sent to [facility] in [place].  Spoke with person at this pharmacy and verified that they have 100 mg syringes available.  They will also go over instructions with Fabiano on how to give the injection.      E-mail sent to Fabiano: As we talked about on the phone, your INR today is 1.56.  Dr. Watkins would like you to bridge with enoxaparin (lovenox).  This is the injection you take every 12 hours until your INR is therapeutic.  This will help protect you from getting blood clots until your INR is therapeutic.  I have sent a prescription for the enoxaparin to your [facility] in [place].  The pharmacist will again go over how to give the injection.</P><P> </P><P>Please take 2.5 tablets (5 mg) of warfarin today and tomorrow.  This is a slightly different dose than we talked about on the phone.  Also, increase the amount of aspirin you take to four of the 81 mg tablets.  Recheck your INR Wednesday, April 24.Call the Anticoagulation Clinic with any  questions.    Jeanette Bryant RN

## 2019-04-24 ENCOUNTER — CARE COORDINATION (OUTPATIENT)
Dept: CARDIOLOGY | Facility: CLINIC | Age: 64
End: 2019-04-24

## 2019-04-24 ENCOUNTER — ANTICOAGULATION THERAPY VISIT (OUTPATIENT)
Dept: ANTICOAGULATION | Facility: CLINIC | Age: 64
End: 2019-04-24

## 2019-04-24 DIAGNOSIS — Z95.811 LVAD (LEFT VENTRICULAR ASSIST DEVICE) PRESENT (H): ICD-10-CM

## 2019-04-24 DIAGNOSIS — I50.22 CHRONIC SYSTOLIC CONGESTIVE HEART FAILURE (H): Primary | ICD-10-CM

## 2019-04-24 LAB — INR PPP: 2.1

## 2019-04-24 RX ORDER — BUMETANIDE 1 MG/1
2 TABLET ORAL 2 TIMES DAILY
Qty: 90 TABLET | Refills: 3 | Status: SHIPPED | OUTPATIENT
Start: 2019-04-24 | End: 2019-08-01

## 2019-04-24 RX ORDER — POTASSIUM CHLORIDE 750 MG/1
30 TABLET, EXTENDED RELEASE ORAL DAILY
Qty: 48 TABLET | Refills: 11 | Status: SHIPPED | OUTPATIENT
Start: 2019-04-24 | End: 2019-09-10

## 2019-04-24 NOTE — LETTER
Patient Name: Danielito Chu   : 1955   Diagnosis/ICD-10: Heart Failure, unspecified I50.9; LVAD 295.811   Requesting Physician: Dr. Lorena Watkins   Date of Request: 19   Date to Draw 2019 (please draw these labs on )     **Please fax results to Edgardo Elias [facility] at [phone].                Call Asim [phone] with Questions      ORDER CODE TESTS TAMEKA.VOL.   X CBASIC Na, K, Cl, CO2, Crea, BUN, Glu, Ca GG 0.5-1    BHEPAT Alb, AlkP, ALT, AST, BBil, TP GG 0.5-1    BLIP Chol, Trig, HDL, LDL GG 1-2    CCOMP Na, K, Cl, CO2, Crea, BUN, Glu, Ca, Alb, AlkP, ALT, AST, Bbil, TP,  GG 0.6-1    HGP CBC & Platelet P 0.3-1    HGDP CBC, Differential & Platelet P 0.3-1    PLT Platelet  P 0.3-1   X INR INR B 2.7    PTT PTT B 2.7    LD Lactate Dehydrogenase GG 0.3-1    PHGB Plasma Hemoglobin GG 2-3    Pre-Albumin Pre-Albumin RG 1.0                  Signed,      Lorena Watkins MD  Heart Failure, Mechanical Circulatory Support and Transplant Cardiology   of Medicine,  Division of Cardiology, [facility]

## 2019-04-24 NOTE — PROGRESS NOTES
D: When I saw Fabiano in clinic on Monday for a nurse vist to assess his DLES, he reported being up 12 lbs since his last clinic visit in March. He said his breathing is fine and his VAD numbers are all fine and unchanged. He said he is worried about the gradual creeping up of his weight.  I: Dr. Watkins ordered increasing Bumex to 2mg BID and keeping his KCL at 30mEq daily. 30mEq is the dose of Kcl that Fabiano reports taking lately.  A: Increased weight from building muscle at cardiac rehab vs extra fluid weight vs table weight  P: Get labs on Friday morning 4/26. Continue Lovenox until INR>2.0. Fabiano may need to get labs next week while he is in [place]. He will tell us the name of the lab that he will go to.

## 2019-04-25 ENCOUNTER — TELEPHONE (OUTPATIENT)
Dept: CARDIOLOGY | Facility: CLINIC | Age: 64
End: 2019-04-25

## 2019-04-25 NOTE — TELEPHONE ENCOUNTER
Called patient and offered to do misc heart appts on Wed - Fri, May 15 - 17.  He wants to do them on Thurs, May 9 and Fri, May 10; as we'll be coming in from a vacation on May 8 and only wants to come to [facility] once. These dates will work, I told patient I would get a schedule out to him by tomorrow, Fri, April 26, he confirmed.

## 2019-04-26 ENCOUNTER — ANTICOAGULATION THERAPY VISIT (OUTPATIENT)
Dept: ANTICOAGULATION | Facility: CLINIC | Age: 64
End: 2019-04-26

## 2019-04-26 DIAGNOSIS — Z95.811 LVAD (LEFT VENTRICULAR ASSIST DEVICE) PRESENT (H): ICD-10-CM

## 2019-04-26 DIAGNOSIS — I50.20 SYSTOLIC HEART FAILURE (H): ICD-10-CM

## 2019-04-26 LAB
ALBUMIN UR-MCNC: NEGATIVE MG/DL
AMPHETAMINES UR QL SCN: NEGATIVE
AMPHETAMINES UR QL SCN: NEGATIVE
APPEARANCE UR: CLEAR
BACTERIA #/AREA URNS HPF: ABNORMAL /HPF
BARBITURATES UR QL: NEGATIVE
BARBITURATES UR QL: NEGATIVE
BENZODIAZ UR QL: NEGATIVE
BENZODIAZ UR QL: NEGATIVE
BILIRUB UR QL STRIP: NEGATIVE
CANNABINOIDS UR QL SCN: NEGATIVE
CANNABINOIDS UR QL SCN: NEGATIVE
COCAINE UR QL: NEGATIVE
COCAINE UR QL: NEGATIVE
COLOR UR AUTO: YELLOW
ETHANOL UR QL SCN: NEGATIVE
ETHANOL UR QL SCN: NEGATIVE
GLUCOSE UR STRIP-MCNC: NEGATIVE MG/DL
HGB UR QL STRIP: NEGATIVE
HYALINE CASTS #/AREA URNS LPF: 6 /LPF (ref 0–2)
INR PPP: 2 (ref 0.86–1.14)
KETONES UR STRIP-MCNC: NEGATIVE MG/DL
LEUKOCYTE ESTERASE UR QL STRIP: NEGATIVE
MISCELLANEOUS TEST: NORMAL
NITRATE UR QL: NEGATIVE
OPIATES UR QL SCN: NEGATIVE
OPIATES UR QL SCN: NEGATIVE
PCP UR QL SCN: NEGATIVE
PH UR STRIP: 5 PH (ref 5–7)
RBC #/AREA URNS AUTO: 2 /HPF (ref 0–2)
SOURCE: ABNORMAL
SP GR UR STRIP: 1.01 (ref 1–1.03)
SQUAMOUS #/AREA URNS AUTO: 2 /HPF (ref 0–1)
UROBILINOGEN UR STRIP-MCNC: 0 MG/DL (ref 0–2)
WBC #/AREA URNS AUTO: 1 /HPF (ref 0–5)

## 2019-04-26 NOTE — PROGRESS NOTES
ANTICOAGULATION FOLLOW-UP CLINIC VISIT    Patient Name:  Danielito Chu  Date:  2019  Contact Type:  Telephone    SUBJECTIVE:     Patient Findings     Comments:   One time dose adjustment today only since pt is at the low end of the goal range & will be traveling on            OBJECTIVE    INR   Date Value Ref Range Status   2019 2.00 (H) 0.86 - 1.14 Final     Factor 2 Assay   Date Value Ref Range Status   10/27/2018 70 60 - 140 % Final     Comment:     The Factor 2 activity level is not a screening test for the Prothrombin 53287   mutation.         ASSESSMENT / PLAN  INR assessment THER    Recheck INR In: 6 DAYS    INR Location Clinic      Anticoagulation Summary  As of 2019    INR goal:   2.0-3.0   TTR:   72.9 % (4.2 mo)   INR used for dosin.00 (2019)   Warfarin maintenance plan:   4 mg (2 mg x 2) every Tue, Sat; 3 mg (2 mg x 1.5) all other days   Full warfarin instructions:   : 4 mg; Otherwise 4 mg every Tue, Sat; 3 mg all other days   Weekly warfarin total:   23 mg   Plan last modified:   Kristie Fernandez, RN (3/29/2019)   Next INR check:   2019   Priority:   INR   Target end date:   Indefinite    Indications    LVAD (left ventricular assist device) present (H) [Z95.811]             Anticoagulation Episode Summary     INR check location:       Preferred lab:       Send INR reminders to:   [facility]    Comments:   [place] [facility] [phone]/Fax [phone]  LVAD plced 18 ASA 81mg Daily Jantoven 2mg tabs Lab [phone]  Call pt at [phone] Emphasize next INR date with pt++Send email each time++  Pt is home .      Anticoagulation Care Providers     Provider Role Specialty Phone number    Lorena Watkins MD [facility] Cardiology [phone]            See the Encounter Report to view Anticoagulation Flowsheet and Dosing Calendar (Go to Encounters tab in chart review, and find the Anticoagulation  Therapy Visit)    Spoke with patient. Gave them their lab results and new warfarin recommendation.  No changes in health, medication, or diet. No missed doses, no falls. No signs or symptoms of bleed or clotting.  Next INR to be done while the pt is in [place].  He is aware of this - see previous notation.  Pt is sent an email with this information as well per his request.      Rosmery Hickman RN

## 2019-04-27 ENCOUNTER — CARE COORDINATION (OUTPATIENT)
Dept: CARDIOLOGY | Facility: CLINIC | Age: 64
End: 2019-04-27
Payer: COMMERCIAL

## 2019-04-27 DIAGNOSIS — I50.22 CHRONIC SYSTOLIC HEART FAILURE (H): ICD-10-CM

## 2019-04-27 DIAGNOSIS — Z95.811 LVAD (LEFT VENTRICULAR ASSIST DEVICE) PRESENT (H): Primary | ICD-10-CM

## 2019-04-27 LAB
ANION GAP SERPL CALCULATED.3IONS-SCNC: 9 MMOL/L (ref 3–14)
BUN SERPL-MCNC: 12 MG/DL (ref 7–30)
CALCIUM SERPL-MCNC: 9.2 MG/DL (ref 8.5–10.1)
CHLORIDE SERPL-SCNC: 98 MMOL/L (ref 94–109)
CO2 SERPL-SCNC: 27 MMOL/L (ref 20–32)
CREAT SERPL-MCNC: 1.01 MG/DL (ref 0.66–1.25)
GFR SERPL CREATININE-BSD FRML MDRD: 79 ML/MIN/{1.73_M2}
GLUCOSE SERPL-MCNC: 112 MG/DL (ref 70–99)
POTASSIUM SERPL-SCNC: 3.6 MMOL/L (ref 3.4–5.3)
SODIUM SERPL-SCNC: 134 MMOL/L (ref 133–144)
T PALLIDUM AB SER QL: NONREACTIVE

## 2019-04-27 NOTE — PROGRESS NOTES
Patient was supposed to have a BMP drawn.  Did not receive faxed results.  Talked to patient and patient stated that he came to [facility] clinic lab instead of going to his local lab. Patient also had several transplant labs drawn.  Called lab and was able to add-on a BMP which will result in a few hours.     Patient called VAD Coordinator on-call when he was at the airport on his way to [place]. Patient stated that [employer] was not allowing him to carry on all of his LVAD equipment because it was too large. This writer talked to patient to reinforced that he does have to carry it on the aircraft with him for safely.  Offered to speak with airline representative.  Ultimately patient was able to carry all of his LVAD equipment on the aircraft with him after he rearranged his bag with the  and MPU so it could fit in the overhead bin.     Instructed patient to call the VAD Coordinator on-call with any further questions or concerns; verbalized understating.

## 2019-04-28 DIAGNOSIS — I50.23 ACUTE ON CHRONIC SYSTOLIC HEART FAILURE (H): ICD-10-CM

## 2019-04-29 LAB
GAMMA INTERFERON BACKGROUND BLD IA-ACNC: 0.12 IU/ML
M TB IFN-G BLD-IMP: NEGATIVE
M TB IFN-G CD4+ BCKGRND COR BLD-ACNC: 8.43 IU/ML
MITOGEN IGNF BCKGRD COR BLD-ACNC: 0 IU/ML
MITOGEN IGNF BCKGRD COR BLD-ACNC: 0 IU/ML
T GONDII IGG SER QL: <3 IU/ML (ref 0–7.1)

## 2019-04-30 LAB
CELL TYPE AUTO: NORMAL
CHANNELSHIFTAUTOB1: -37
CHANNELSHIFTAUTOT1: -30
CROSSMATCHDATEAUTO: NORMAL
DONOR AUTO: NORMAL
DONORCELLDATE AUTO: NORMAL
POS CUT OFF AUTO B: >101
POS CUT OFF AUTO T: >67
RESULT AUTO B1: NORMAL
RESULT AUTO T1: NORMAL
SA1 CELL: NORMAL
SA1 COMMENTS: NORMAL
SA1 HI RISK ABY: NORMAL
SA1 MOD RISK ABY: NORMAL
SA1 TEST METHOD: NORMAL
SA2 CELL: NORMAL
SA2 COMMENTS: NORMAL
SA2 HI RISK ABY UA: NORMAL
SA2 MOD RISK ABY: NORMAL
SA2 TEST METHOD: NORMAL
SERUM DATE AUTO B1: NORMAL
SERUM DATE AUTO T1: NORMAL
TESTMETHODAUTO: NORMAL
TREATMENT AUTO B1: NORMAL
TREATMENT AUTO T1: NORMAL
UNACCEPTABLE ANTIGEN: NORMAL
UNOS CPRA: 0

## 2019-04-30 RX ORDER — COLCHICINE 0.6 MG/1
0.6 TABLET ORAL 2 TIMES DAILY
Qty: 60 TABLET | Refills: 1 | Status: SHIPPED | OUTPATIENT
Start: 2019-04-30 | End: 2019-07-09

## 2019-04-30 NOTE — TELEPHONE ENCOUNTER
LVD 12/26/2018 with labs ,Rheumatology.Per notes,  Recommend labs in 6 months, due end of June 2019.

## 2019-05-02 ASSESSMENT — NEW YORK HEART ASSOCIATION (NYHA) CLASSIFICATION: NYHA FUNCTIONAL CLASS: IV

## 2019-05-02 ASSESSMENT — PULMONARY FUNCTION TESTS: FEV1 (%PREDICTED): 2

## 2019-05-02 NOTE — PROGRESS NOTES
Addendum   Patient did not have their labs done today. He plans to go tomorrow.  I e-mailed him our fax number so they will send us the results  Chandra Ordonez [facility]    Addendum 5/3/19 Patient did have his labs done today.  Results likely won't be back until Monday. Instructed patient to continue with current warfarin regimen until results are available. [facility]

## 2019-05-03 ENCOUNTER — TELEPHONE (OUTPATIENT)
Dept: CARDIOLOGY | Facility: CLINIC | Age: 64
End: 2019-05-03

## 2019-05-03 LAB
COTININE SERPL-MCNC: NORMAL NG/ML
INR PPP: 3
NICOTINE SERPL-MCNC: NORMAL NG/ML
RESULT: NORMAL
SEND OUTS MISC TEST CODE: NORMAL
SEND OUTS MISC TEST SPECIMEN: NORMAL
TEST NAME: NORMAL

## 2019-05-03 NOTE — TELEPHONE ENCOUNTER
D:  Pt called to report that he had gone to the clinic to have his INR checked this morning in [place].  However, they do not perform point of care testing there, and would send the labs to [facility] for testing.  He wanted us to know that they would be faxing his results to the [facility].   I:  This writer contacted the Coumadin Clinic to alert them to this change from what they expected, and Kristie agreed to call the patient to make a coumadin plan for the weekend.  A:  Stable, delayed INR result.  P:  Per [facility] clinic plan.

## 2019-05-03 NOTE — TELEPHONE ENCOUNTER
MCS Episodes    Linked  Type Noted    LVAD MECHANICAL CIRCULATORY DEVICE 04/18/2017    Episodes of Care      VAD Selection    VAD Multidisciplinary Review   Multidisciplinary review date: 11/16/18   Inclusion Criteria   Discussed VAD with patient and/or decision makers. Reviewed anticipated survival benefit, anticipated functional improvement, risks, and benefits. Patient and/or decision maker verbalize understanding and agree to VAD implant?: Yes   VAD is elective procedure?: Yes   NYHA class: IV   INTERMACS score: 3   Patient has: failed to respond to optimal medical management for at least 45 of the last 60 days   Cardiopulmonary stress testing completed?: Yes   Cardiopulmonary stress test detail: MVO2 < 14 ml/kg/min   LVEF is: < 25%   Exclusion Criteria   Has condition, other than heart failure, which would limit survival to < 2 years?: No   Cardiomyopathy with restrictive physiology, unless severe LV systolic failure and LV dilation present?: No   Technical obstacles that pose and inordinately high surgical risk in the judgment of the MCS surgeon?: No   Active systemic infection? (unless ALL of following criteria met: negative blood cultures x 2 days, antibiotic treatment x 7 days and Infectious Disease clearance pre-operatively): No   Presence of active malignancy AND life expectancy < 2 years?: No   Stroke within the last six weeks, unless cleared by neurology team: No   Diagnosed with severe, irreversible pulmonary disease (supplemental O2 usage, FEV1 < 50% predicted): No   Pulmonary hypertension as a primary diagnosis: No   Chronic dialysis: No   Evidence of significant peripheral vascular disease accompanied by resting leg pain or ulceration unless treatment plan is in place: No   Carotid artery disease (>80% extracranial stenosis on Doppler) that could result in an adverse neurological event if left untreated: No   Evidence of an untreated abdominal aortic aneurysm >5 cm as measured by abdominal  ultrasounds within the last 180 days unless treatment plan in place: No   Severe or irreversible hepatic disease, evidence of shock liver, and/o biopsy-proven cirrhosis: No   Active contraindication to anticoagulation, INR > 2.5 in absence of concurrent anticoagulation therapy, platelet < 50,000, history of intolerance to anticoagulation, or other coagulopathy unless Hematology consulted and plan is in place: No   Acute valvular infective endocarditis with bacteremia: No   Neuromuscular disease, psychiatric diagnosis, dementia or other process that severely compromises ability to use and care for external system components or to ambulate/exercise: No   Inability to understand the procedure, risk involved, or to comply with follow-up evaluation by VAD team: No   For menstruating females: Current pregnancy or unable/unwilling to follow contraception plan: Not applicable   Relative Contraindications   Alcohol and/or recreational drug abuse within past six months: No (Comment: one marajuana test postive but did not meet definition of substance abue )   Significant history of depression or other mental illness, refractory to therapy or thought to be a risk to successful outcome with MCS, as per assessment of trained professional: No   Demonstrated on-going non-compliance with demonstrated inability to comply with medical recommendations on multiple occasions: No   Unsafe living environment or lack of adequate support system: No   Lack of adequate insurance coverage or waiver by hospital administration: No   Evidence of other ongoing physical, financial, or psychosocial issues that will preclude the intended goal of safety and improvements in quality and duration of life, unless a plan for resolution and support is in process: No   Multidisciplinary Decision   Decision: Approved Comment: pending liver biopsy    Implant as: Destination Therapy   Ineligible for transplant reason: Other   Specify: reevluate liver function  post, prove negative ileana- expect him to be a candidate in the future, also too sick presently

## 2019-05-05 DIAGNOSIS — I50.23 ACUTE ON CHRONIC SYSTOLIC HEART FAILURE (H): ICD-10-CM

## 2019-05-06 ENCOUNTER — ANTICOAGULATION THERAPY VISIT (OUTPATIENT)
Dept: ANTICOAGULATION | Facility: CLINIC | Age: 64
End: 2019-05-06

## 2019-05-06 DIAGNOSIS — Z95.811 LVAD (LEFT VENTRICULAR ASSIST DEVICE) PRESENT (H): ICD-10-CM

## 2019-05-06 NOTE — PROGRESS NOTES
ANTICOAGULATION FOLLOW-UP CLINIC VISIT    Patient Name:  Danielito Chu  Date:  5/6/2019  Contact Type:  Telephone    SUBJECTIVE:     Patient Findings     Comments:   Encouraged a serving of something that contains vitamin K.            OBJECTIVE    INR   Date Value Ref Range Status   05/03/2019 3.0  Final     Factor 2 Assay   Date Value Ref Range Status   10/27/2018 70 60 - 140 % Final     Comment:     The Factor 2 activity level is not a screening test for the Prothrombin 57100   mutation.         ASSESSMENT / PLAN  No question data found.  Anticoagulation Summary  As of 5/6/2019    INR goal:   2.0-3.0   TTR:   74.2 % (4.4 mo)   INR used for dosing:   3.0 (5/3/2019)   Warfarin maintenance plan:   4 mg (2 mg x 2) every Tue, Sat; 3 mg (2 mg x 1.5) all other days   Full warfarin instructions:   4 mg every Tue, Sat; 3 mg all other days   Weekly warfarin total:   23 mg   Plan last modified:   Kristie Fernandez RN (3/29/2019)   Next INR check:   5/9/2019   Priority:   INR   Target end date:   Indefinite    Indications    LVAD (left ventricular assist device) present (H) [Z95.811]             Anticoagulation Episode Summary     INR check location:       Preferred lab:       Send INR reminders to:    [facility]    Comments:   [place] 4/[facility]-5[facility]8 [facility] [phone]/Fax [phone]  LVAD plced 12/5/18 ASA 81mg Daily Jantoven 2mg tabs Lab [phone]  Call pt at [phone] Emphasize next INR date with pt++Send email each time++  Pt is home .      Anticoagulation Care Providers     Provider Role Specialty Phone number    Lorena Watkins MD [facility] Cardiology [phone]            See the Encounter Report to view Anticoagulation Flowsheet and Dosing Calendar (Go to Encounters tab in chart review, and find the Anticoagulation Therapy Visit)    Spoke with patient.     Bri Becerril RN

## 2019-05-07 ENCOUNTER — CARE COORDINATION (OUTPATIENT)
Dept: CARDIOLOGY | Facility: CLINIC | Age: 64
End: 2019-05-07

## 2019-05-07 RX ORDER — LOSARTAN POTASSIUM 25 MG/1
25 TABLET ORAL DAILY
Qty: 90 TABLET | Refills: 3 | Status: SHIPPED | OUTPATIENT
Start: 2019-05-07 | End: 2020-01-01

## 2019-05-09 ENCOUNTER — CARE COORDINATION (OUTPATIENT)
Dept: CARDIOLOGY | Facility: CLINIC | Age: 64
End: 2019-05-09

## 2019-05-09 ENCOUNTER — ANCILLARY PROCEDURE (OUTPATIENT)
Dept: BONE DENSITY | Facility: CLINIC | Age: 64
End: 2019-05-09
Attending: INTERNAL MEDICINE
Payer: COMMERCIAL

## 2019-05-09 ENCOUNTER — ANTICOAGULATION THERAPY VISIT (OUTPATIENT)
Dept: ANTICOAGULATION | Facility: CLINIC | Age: 64
End: 2019-05-09

## 2019-05-09 ENCOUNTER — HOSPITAL ENCOUNTER (OUTPATIENT)
Facility: CLINIC | Age: 64
Discharge: HOME | End: 2019-05-09
Attending: INTERNAL MEDICINE | Admitting: INTERNAL MEDICINE
Payer: COMMERCIAL

## 2019-05-09 ENCOUNTER — APPOINTMENT (OUTPATIENT)
Dept: MEDSURG UNIT | Facility: CLINIC | Age: 64
End: 2019-05-09
Attending: INTERNAL MEDICINE
Payer: COMMERCIAL

## 2019-05-09 VITALS — TEMPERATURE: 97.5 F | OXYGEN SATURATION: 97 % | RESPIRATION RATE: 20 BRPM

## 2019-05-09 DIAGNOSIS — Z95.811 LVAD (LEFT VENTRICULAR ASSIST DEVICE) PRESENT (H): ICD-10-CM

## 2019-05-09 DIAGNOSIS — I50.20 SYSTOLIC HEART FAILURE (H): ICD-10-CM

## 2019-05-09 DIAGNOSIS — I50.22 CHRONIC SYSTOLIC CONGESTIVE HEART FAILURE (H): Primary | ICD-10-CM

## 2019-05-09 LAB — INR PPP: 2.03 (ref 0.86–1.14)

## 2019-05-09 PROCEDURE — 27210794 ZZH OR GENERAL SUPPLY STERILE: Performed by: INTERNAL MEDICINE

## 2019-05-09 PROCEDURE — 93451 RIGHT HEART CATH: CPT | Mod: 26 | Performed by: INTERNAL MEDICINE

## 2019-05-09 PROCEDURE — 93451 RIGHT HEART CATH: CPT | Performed by: INTERNAL MEDICINE

## 2019-05-09 PROCEDURE — 25000125 ZZHC RX 250: Performed by: PHYSICIAN ASSISTANT

## 2019-05-09 PROCEDURE — 40000166 ZZH STATISTIC PP CARE STAGE 1

## 2019-05-09 PROCEDURE — 25000125 ZZHC RX 250: Performed by: INTERNAL MEDICINE

## 2019-05-09 RX ORDER — LIDOCAINE 40 MG/G
CREAM TOPICAL
Status: COMPLETED | OUTPATIENT
Start: 2019-05-09 | End: 2019-05-09

## 2019-05-09 RX ORDER — LIDOCAINE 40 MG/G
CREAM TOPICAL
Status: CANCELLED | OUTPATIENT
Start: 2019-05-09

## 2019-05-09 RX ADMIN — LIDOCAINE: 40 CREAM TOPICAL at 08:45

## 2019-05-09 NOTE — PROGRESS NOTES
[facility]   Interventional Cardiology        Consenting/Education for Cardiac Cath Lab Procedure: Right Heart Catheterization     Patient understands we would like to perform .Right Heart Catheterization due to transplant work-up/heart failure. This procedure will be performed by Dr. Allan.    The patient understands the following:     Right Heart Catheterization: A fine tube (catheter) is put into the vein of the groin/neck.  It is carefully passed along until it reaches the heart and then goes up into the blood vessels of the lungs. This is done to measure a variety of pressures in your heart and can tell us how well the heart is filling and emptying, as well as monitor fluid status.     No sedation is planned for this procedure. Patient also understands risks and complications of the procedure which include, but are not limited to bruising/swelling around the incision site, infection, bleeding, allergic reaction to local anesthetic, air embolism, arterial puncture, stroke, heart attack.       Patient verbalized understanding of risks and benefits and has elected to proceed with the procedure or procedures listed above.    Ai Maravilla PA-C  [facility] Interventional Cardiology  [phone]

## 2019-05-09 NOTE — DISCHARGE INSTRUCTIONS
[facility]                        Interventional Cardiology  Discharge Instructions   Post Right Heart Cath      AFTER YOU GO HOME:    DO drink plenty of fluids    DO resume your regular diet and medications unless otherwise instructed by your Primary Physician    Do Not scrub the procedure site vigorously    No lotion or powder to the puncture site for 3 days    CALL YOUR PRIMARY PHYSICIAN IF: You may resume all normal activity.  Monitor neck site for bleeding, swelling, or voice changes. If you notice bleeding or swelling immediately apply pressure to the site and call number below to speak with Cardiology Fellow.  If you experience any changes in your breathing you should call your doctor immediately or come to the closest Emergency Department.  Do not drive yourself.    ADDITIONAL INSTRUCTIONS: Medications: You are to resume all home medications including anticoagulation therapy unless otherwise advised by your primary cardiologist or nurse coordinator.    Follow Up: Per your primary cardiology team    If you have any questions or concerns regarding your procedure site please call [phone] at anytime and ask for Cardiology Fellow on call.  They are available 24 hours a day.  You may also contact the Cardiology Clinic after hours number at [phone].                                                       Telephone Numbers [phone] Monday-Friday 8:00 am to 4:30 pm    [phone] After 4:30 pm Monday-Friday, Weekends & Holidays  Ask for Interventional Cardiologist on call. Someone is on call 24 hours/day   [facility] toll free number [phone] Monday-Friday 8:00 am to 4:30 pm   [facility] Emergency Dept [phone]

## 2019-05-09 NOTE — IP AVS SNAPSHOT
[address] [address]                                    After Visit Summary   5/9/2019    Danielito Chu    MRN: [id number]           After Visit Summary Signature Page    I have received my discharge instructions, and my questions have been answered. I have discussed any challenges I see with this plan with the nurse or doctor.    ..........................................................................................................................................  Patient/Patient Representative Signature      ..........................................................................................................................................  Patient Representative Print Name and Relationship to Patient    ..................................................               ................................................  Date                                   Time    ..........................................................................................................................................  Reviewed by Signature/Title    ...................................................              ..............................................  Date                                               Time          22EPIC Rev 08/18

## 2019-05-09 NOTE — PROGRESS NOTES
1055:  Received from [facility] post Right Heart Cath. RIJ band aid dressing clean, flat, dry, and intact.  Denies pain. Discharge instructions have been reviewed and copy given to patient.   1110: Discharged to home. Patient walked self to vehicle.

## 2019-05-09 NOTE — PROGRESS NOTES
2ANTICOAGULATION FOLLOW-UP CLINIC VISIT    Patient Name:  Danielito Chu  Date:  2019  Contact Type:  Telephone    SUBJECTIVE:  Patient Findings     Comments:   Patient had a Right Heart Cath Today        Clinical Outcomes     Comments:   Patient had a Right Heart Cath Today           OBJECTIVE    INR   Date Value Ref Range Status   2019 2.03 (H) 0.86 - 1.14 Final     Factor 2 Assay   Date Value Ref Range Status   10/27/2018 70 60 - 140 % Final     Comment:     The Factor 2 activity level is not a screening test for the Prothrombin 92809   mutation.         ASSESSMENT / PLAN  INR assessment THER    Recheck INR In: 1 WEEK    INR Location Outside lab      Anticoagulation Summary  As of 2019    INR goal:   2.0-3.0   TTR:   75.4 % (4.6 mo)   INR used for dosin.03 (2019)   Warfarin maintenance plan:   4 mg (2 mg x 2) every Tue, Sat; 3 mg (2 mg x 1.5) all other days   Full warfarin instructions:   : 4 mg; Otherwise 4 mg every Tue, Sat; 3 mg all other days   Weekly warfarin total:   23 mg   Plan last modified:   Kristie Fernandez, RN (3/29/2019)   Next INR check:   5/15/2019   Priority:   INR   Target end date:   Indefinite    Indications    LVAD (left ventricular assist device) present (H) [Z95.811]             Anticoagulation Episode Summary     INR check location:       Preferred lab:       Send INR reminders to:   [facility]    Comments:   [place] [facility] [phone]/Fax [phone]  LVAD plced 18 ASA 81mg Daily Jantoven 2mg tabs Lab [phone]  Call pt at [phone] Emphasize next INR date with pt++Send email each time++  Pt is home .      Anticoagulation Care Providers     Provider Role Specialty Phone number    Lorena Watkins MD Responsible Cardiology [phone]            See the Encounter Report to view Anticoagulation Flowsheet and Dosing Calendar (Go to Encounters tab in chart review, and find the Anticoagulation  Therapy Visit)    Spoke with Fabiano today. He will increase his dose today and then return to his maintenance dose.    Giovanni Ordonez [facility]

## 2019-05-10 ENCOUNTER — DOCUMENTATION ONLY (OUTPATIENT)
Dept: TRANSPLANT | Facility: CLINIC | Age: 64
End: 2019-05-10

## 2019-05-10 ENCOUNTER — APPOINTMENT (OUTPATIENT)
Dept: TRANSPLANT | Facility: CLINIC | Age: 64
End: 2019-05-10
Attending: INTERNAL MEDICINE
Payer: COMMERCIAL

## 2019-05-10 DIAGNOSIS — I50.20 SYSTOLIC HEART FAILURE (H): ICD-10-CM

## 2019-05-10 LAB
6 MIN WALK (FT): 1560 FT
6 MIN WALK (M): 475 M

## 2019-05-10 NOTE — PROGRESS NOTES
"Called patient/caregiver to check in 20 weeks post discharge. Pt reports VAD parameters stable and weight \"up or down a few pounds here or there\". Reviewed medications and answered any questions. Patient reports sleeping well and no anxiety since being home with LVAD. Patient is fully able to move around the house and care for him/herself independently.     Discussed specific new problems/stressors since being discharged from the hospital: none at this time. Empathized with patient and reviewed coping strategies: enlisting support from friends and love ones, attending patient and caregiver support groups, reviewing LVAD educational materials to reinforce knowledge, and talking about concerns with family/care providers/trusted others. Encouraged pt to page VAD Coordinator with any issues or questions. Pt verbalizes understanding.    "

## 2019-05-10 NOTE — PROGRESS NOTES
Transplant Teaching:  Mr Chu underwent LVAD implantation in December, and has now recovered from surgery and is ready to move forward with heart transplant.  Today I met with Fabiano to do tx teaching.  He was given the [facility] tutorial, the UNOS brochure on Multiple Listing, the [facility] booklets including current program statistics as published in Jan 2019, and a copy of the Transplant Handbook.   We reviewed the candidate selection process, insurance prior authorization, UNOS priority categories, the waiting period, donor issues including the potential for a CDC increased risk donor, and the pre-, kain-, and post-tx periods.  In addition, we discussed the post-hospital clinic and biopsy routines, as well as the main potential risks of transplant including rejection, infection, transplant vasculopathy, and malignancy.   Fabiano also mentions he has a home in [place], and his girlfriend, Yanna, lives in [place], and he wondered about the possibility of transferring to a different transplant program in those locations.      Fabiano has the phone number to the Transplant Office for future questions or concerns.

## 2019-05-11 LAB — FIO2-PRE: 21 %

## 2019-05-13 ENCOUNTER — CARE COORDINATION (OUTPATIENT)
Dept: CARDIOLOGY | Facility: CLINIC | Age: 64
End: 2019-05-13

## 2019-05-13 DIAGNOSIS — G47.00 INSOMNIA, UNSPECIFIED TYPE: ICD-10-CM

## 2019-05-13 NOTE — PROGRESS NOTES
Called patient/caregiver to check in 20 weeks post discharge. Pt reports VAD parameters stable and weight up and down a few pounds. Reviewed medications and answered any questions. Patient reports sleeping well and less anxiety since being home with LVAD. Patient is fully able to move around the house and care for him/herself independently.     Discussed specific new problems/stressors since being discharged from the hospital: none now Empathized with patient and reviewed coping strategies: enlisting support from friends and love ones, attending patient and caregiver support groups, reviewing LVAD educational materials to reinforce knowledge, and talking about concerns with family/care providers/trusted others. Encouraged pt to page VAD Coordinator with any issues or questions. Pt verbalizes understanding.

## 2019-05-15 ENCOUNTER — ANTICOAGULATION THERAPY VISIT (OUTPATIENT)
Dept: ANTICOAGULATION | Facility: CLINIC | Age: 64
End: 2019-05-15

## 2019-05-15 DIAGNOSIS — Z95.811 LVAD (LEFT VENTRICULAR ASSIST DEVICE) PRESENT (H): ICD-10-CM

## 2019-05-15 LAB — INR PPP: 3.1

## 2019-05-15 NOTE — PROGRESS NOTES
ANTICOAGULATION FOLLOW-UP CLINIC VISIT    Patient Name:  Danielito Chu  Date:  5/15/2019  Contact Type:  Telephone    SUBJECTIVE:  Patient Findings     Comments:   No Problems found. No Changes in Health, Medications, or diet. No Signs of bruising, bleeding or clotting.        Clinical Outcomes     Comments:   No Problems found. No Changes in Health, Medications, or diet. No Signs of bruising, bleeding or clotting.           OBJECTIVE    INR   Date Value Ref Range Status   05/15/2019 3.1  Final     Factor 2 Assay   Date Value Ref Range Status   10/27/2018 70 60 - 140 % Final     Comment:     The Factor 2 activity level is not a screening test for the Prothrombin 39198   mutation.         ASSESSMENT / PLAN  INR assessment SUPRA    Recheck INR In: 1 WEEK    INR Location Outside lab      Anticoagulation Summary  As of 5/15/2019    INR goal:   2.0-3.0   TTR:   76.0 % (4.8 mo)   INR used for dosing:   3.1! (5/15/2019)   Warfarin maintenance plan:   4 mg (2 mg x 2) every Tue; 3 mg (2 mg x 1.5) all other days   Full warfarin instructions:   4 mg every Tue; 3 mg all other days   Weekly warfarin total:   22 mg   Plan last modified:   Giovanni Ordonez, [facility] (5/15/2019)   Next INR check:   5/22/2019   Priority:   INR   Target end date:   Indefinite    Indications    LVAD (left ventricular assist device) present (H) [Z95.811]             Anticoagulation Episode Summary     INR check location:       Preferred lab:       Send INR reminders to:    [facility]    Comments:   [place] 4/27-5/8 [facility] [phone]/Fax [phone]  LVAD plced 12/5/18 ASA 81mg Daily Jantoven 2mg tabs Lab [phone]  Call pt at [phone] Emphasize next INR date with pt++Send email each time++  Pt is home .      Anticoagulation Care Providers     Provider Role Specialty Phone number    Lorena Watkins MD [facility] Cardiology [phone]            See the Encounter Report to view  Anticoagulation Flowsheet and Dosing Calendar (Go to Encounters tab in chart review, and find the Anticoagulation Therapy Visit)    Spoke with patient. He called in his INR results today. I gave him a  new warfarin recommendation.  No changes in health, medication, or diet. No missed doses, no falls. No signs or symptoms of bleed or clotting.      Giovanni Ordonez, [facility]

## 2019-05-15 NOTE — PROGRESS NOTES
Addendum 5/15/19    Patient did not have their labs done today. I left a  message for the  patient to get their labs done as soon as possible and call the Anticoagulation Clinic.    Chandra Ordonez [facility]

## 2019-05-16 NOTE — TELEPHONE ENCOUNTER
traZODone (DESYREL) 100 MG    Last Written Prescription Date:  3/7/19  Last Fill Quantity: 30,   # refills: 1  Last Office Visit : 3/22/19  Future Office visit:  6/7/19    Routing refill request to provider for review/approval because:  Drug not on the refill protocol

## 2019-05-22 ENCOUNTER — ANCILLARY PROCEDURE (OUTPATIENT)
Dept: CARDIOLOGY | Facility: CLINIC | Age: 64
End: 2019-05-22
Attending: INTERNAL MEDICINE
Payer: COMMERCIAL

## 2019-05-22 ENCOUNTER — CARE COORDINATION (OUTPATIENT)
Dept: CARDIOLOGY | Facility: CLINIC | Age: 64
End: 2019-05-22

## 2019-05-22 DIAGNOSIS — Z95.810 CARDIAC DEFIBRILLATOR IN SITU: ICD-10-CM

## 2019-05-22 PROCEDURE — 93296 REM INTERROG EVL PM/IDS: CPT | Mod: ZF

## 2019-05-22 PROCEDURE — 93295 DEV INTERROG REMOTE 1/2/MLT: CPT | Performed by: INTERNAL MEDICINE

## 2019-05-22 RX ORDER — TRAZODONE HYDROCHLORIDE 100 MG/1
TABLET ORAL
Qty: 30 TABLET | Refills: 1 | OUTPATIENT
Start: 2019-05-22

## 2019-05-22 NOTE — PROGRESS NOTES
Addendum 5/22/19 Pt reports he will get an INR done on Thursday 5/23/19. Will look for results and then call pt with results. Pt communicated understanding. Kristie Fernandez RN

## 2019-05-22 NOTE — PROGRESS NOTES
I called Fabiano to ask him to request refills of his non-cardiac meds from his PCP. He agreed to do this. He then told me he has had some short runs of SVT in the 160s during his cardiac rehab sessions. I asked the device nurse to check Fabiano's automatic transmissions. She said she would call Fabiano to have him force a transmission.

## 2019-05-23 ENCOUNTER — ANTICOAGULATION THERAPY VISIT (OUTPATIENT)
Dept: ANTICOAGULATION | Facility: CLINIC | Age: 64
End: 2019-05-23

## 2019-05-23 DIAGNOSIS — Z95.811 LVAD (LEFT VENTRICULAR ASSIST DEVICE) PRESENT (H): ICD-10-CM

## 2019-05-23 LAB — INR PPP: 2.4

## 2019-05-23 NOTE — PROGRESS NOTES
ANTICOAGULATION FOLLOW-UP CLINIC VISIT    Patient Name:  Danielito Chu  Date:  2019  Contact Type:  Telephone    SUBJECTIVE:  Patient Findings         Clinical Outcomes     Negatives:   Major bleeding event, Thromboembolic event, Anticoagulation-related hospital admission, Anticoagulation-related ED visit, Anticoagulation-related fatality           OBJECTIVE    INR   Date Value Ref Range Status   2019 2.40  Final     Comment:     Outside Lab      Factor 2 Assay   Date Value Ref Range Status   10/27/2018 70 60 - 140 % Final     Comment:     The Factor 2 activity level is not a screening test for the Prothrombin 31121   mutation.         ASSESSMENT / PLAN  INR assessment THER    Recheck INR In: 1 WEEK    INR Location Outside lab      Anticoagulation Summary  As of 2019    INR goal:   2.0-3.0   TTR:   76.5 % (5.1 mo)   INR used for dosin.40 (2019)   Warfarin maintenance plan:   4 mg (2 mg x 2) every Tue; 3 mg (2 mg x 1.5) all other days   Full warfarin instructions:   4 mg every Tue; 3 mg all other days   Weekly warfarin total:   22 mg   Plan last modified:   Giovanni Ordonez [facility] (5/15/2019)   Next INR check:   2019   Priority:   INR   Target end date:   Indefinite    Indications    LVAD (left ventricular assist device) present (H) [Z95.811]             Anticoagulation Episode Summary     INR check location:       Preferred lab:       Send INR reminders to:   [facility]    Comments:   [place] [facility] [phone]/Fax [phone]  LVAD plced 18 ASA 81mg Daily Jantoven 2mg tabs Lab [phone]/[phone]  Call pt at [phone] Emphasize next INR date with pt++Send email each time++  Pt is home .      Anticoagulation Care Providers     Provider Role Specialty Phone number    Lorena Watkins MD [facility] Cardiology [phone]            See the Encounter Report to view Anticoagulation Flowsheet and Dosing Calendar (Go to  Encounters tab in chart review, and find the Anticoagulation Therapy Visit)    Spoke with patient. Gave them their lab results and new warfarin recommendation.  No changes in health, medication, or diet. No missed doses, no falls. No signs or symptoms of bleed or clotting.     Kristie Fernandez RN

## 2019-05-24 ENCOUNTER — COMMITTEE REVIEW (OUTPATIENT)
Dept: TRANSPLANT | Facility: CLINIC | Age: 64
End: 2019-05-24

## 2019-05-24 ENCOUNTER — TELEPHONE (OUTPATIENT)
Dept: TRANSPLANT | Facility: CLINIC | Age: 64
End: 2019-05-24

## 2019-05-24 ENCOUNTER — CARE COORDINATION (OUTPATIENT)
Dept: TRANSPLANT | Facility: CLINIC | Age: 64
End: 2019-05-24

## 2019-05-24 DIAGNOSIS — M81.0 OSTEOPOROSIS: Primary | ICD-10-CM

## 2019-05-24 NOTE — LETTER
Danielito Christianzully Chu  [address] [place] [address]    May 24, 2019    Dear Mr. Chu,    This letter is written in follow-up to your recent heart transplant evaluation at the [facility], [place]. As you are aware, we have receivedcoverage approval from your insurance company(s), and therefore your name was added to the [facility] heart transplant waiting list on 5-30-19 as Status 4.    During the waiting period, an ALA or PRA ( antibody ) blood sample will need to be sent to the [place] every 3 months.  This immunology blood sample will be used to match you with potential organ donors.  Your next sample should be drawn around 7-20-19.       There is a standing order for these labs at the [facility] and you can obtain them at any [place] clinic.  If you prefer to go to an outside clinic and/or through your primary care provider, please notify your transplant coordinator for further instructions and lab kits.  If you live out of the metro area or out of state, we will be sending you  kits to send in your labs.  These will be sent to the [facility] to be resulted.  Your results will be relayed to you in a letter once the labs are resulted.  If there are any changes in your antibody levels, your transplant coordinator will contact you.      In addition, you or your doctor(s) must notify the Transplant office at the number above if you should acquire any significant infections (such as pneumonia or abscesses), and/or experience any significant changes in your medical condition, and/or require hospitalization. During business hours 8:30 AM - 4:00 PM Monday -Friday, please notify us at the above number. During non-business hours and on weekends or holidays, please use the emergency number,[phone] to notify the On-Call Transplant Coordinator.     Our program has physician and surgeon coverage 24 hours a day, 365 days a year. If this coverage changes or there are  substantial program changes, you will be notified in writing by letter.     This letter will also serve as a reminder to complete your dental work and obtain any necessary cancer screening tests required before your transplant. This includes colonoscopy, as well as prostate screening for men, andmammogram and gynecologic testing for women. Your primary care clinic can assist you with arranging for these exams. Please remind your caregivers to forward copies of your records and final reports once your dental work and cancer screening is complete.     Finally, attached is a letter from the [employer] ([employer]). It describes the services and information offered to patients by [employer] and the Organ Procurement and Transplantation Network.    Sincerely,     Ashley Domingo RN  Transplant Coordinator  Thoracic Transplant Program    Encl. [employer] Letter.

## 2019-05-24 NOTE — PROGRESS NOTES
Patient Update 5-23-19    Spoke to patient and discussed possibly doing his presentation tomorrow for transplant.  Patient requested to meet again when he comes in a few weeks to see Dr. Watkins, confirmed coordinator will be at his visit.  He is traveling at the end of the summer and will need to be put on hold, reassured him that this is within the guidelines for tx.  Patient to notify coordinator of exact dates when he will be gone.  Will update patient within the next 2 weeks with outcome of transplant committee decision.  Patient verbalized understanding of the plan and agrees to call Transplant Coordinator with any further questions or concerns.

## 2019-05-24 NOTE — LETTER
[facility]  Heart Transplant Program  [employer] Form    The following patient is a potential Heart Transplant recipient at the [facility].  This form will serve as a request to set up a flight plan for this patient.  Please contact the patient with a plan and please forward the flight plan information to:    [facility] Transplant Office  Heart Transplant Coordinator (Ashley or Garrett)  Fax:  [phone]  Phone:  [phone]    Demographic Information on Danielito Chu:    Danielito Chu  Gender: male  : 1955  [address]  [place] [address] [phone] (home)   Medical Record: [id number]  Insurance: Payor: PREFERREDONE / Plan: PREFERREDONE HMO / Product Type: PPO /     Diagnosis:  NICM  Organ:  Heart  ABO:  O  Number of Sternotomies:  1    Feel free to call the Transplant Office at anytime with any questions or concerns.    Thank You,    Ashley Domingo, RN, BSN  Heart Transplant Coordinator  [facility]

## 2019-05-24 NOTE — LETTER
May 30, 2019  Danielito Chu  : 1955  ICD10:  I50    Subject: Vaccination Update Request    Danielito Chu is a potential heart transplant recipient currently being followed by the [facility].  We would like to request your assistance in obtaining the following tests and/or procedures in your local community to assist in the care of our mutual patient.  Please call patient to arrange appointments with your clinic.    Tetanus, diphtheria, Pertussis (Dtap) if none in 10 years and Tdap booster every 10 years  Hepatitis B vaccine series (if Hepatitis B surface antibody negative).  If titers are negative after 6 months, patient will need a repeat 3 shot vaccine series  Hepatitis A vaccine (if Hepatitis A surface antibody negative)-for high risk patents:  foreign born in high risk areas, exposure to adoptees from high risk areas, travel to high risk areas  Pneumociccal vaccine-according to ACIP guidelines for PCV13 and PPSV23   Influenza vaccine annually   Varivax (Chicken Pox) if varicella titers are negative  (Human papillomavirus) HPV according to current CDC guidelines for patients younger than 26 year old  Zostavax (Shingles) vaccine for patients older than 60 years old    Results for JAYCEE CHU (MRN [id number]) as of 2019 14:37   Ref. Range 10/24/2018 06:34   CMV Antibody IgG Latest Ref Range: 0.0 - 0.8 AI >8.0 (H)   EBV Capsid Antibody IgG Latest Ref Range: 0.0 - 0.8 AI >8.0 (H)   HERPES SIMPLEX VIRUS TYPE 1 AND 2 IGG Unknown Rpt (A)   Hep B Surface Agn Latest Ref Range: NR^Nonreactive  Nonreactive   Hepatitis B Surface Antibody Latest Ref Range: <8.00 m[IU]/mL 0.45   Hepatitis B Core Trisha Latest Ref Range: NR^Nonreactive  Nonreactive   Hepatitis C Antibody Latest Ref Range: NR^Nonreactive  Nonreactive   HIV Antigen Antibody Combo Pretransplant Latest Ref Range: NR^Nonreactive  Nonreactive   Varicella Zoster Virus Antibody IgG Latest Ref Range: 0.0 - 0.8  AI 5.0 (H)     Please fax results as soon as they are available to:   Thoracic Transplant Department  Fax Number:  [phone]    Thank you  for your continued support and the opportunity to collaborate in the care of this patient.  If you have any questions, please call Ashley Domingo, Transplant Coordinator,  at [phone] (option 2) or [phone].      Lorena Watkins M.D.  Division of Cardiology   of Medicine    Ashley Domingo RN, BSN  Heart Transplant Coordinator  [facility]    CC:

## 2019-05-24 NOTE — PROGRESS NOTES
Patient Call  05/24/19    Called patient and left message to discuss plan of care for transplant.  Asked patient to call transplant coordinator at [phone] (option 2) as soon as able.     Patient returned phone call, aware that he was approved for transplant.  Insurance information sent, patient aware this may take up to 1 week to approve.      Patient verbalized understanding of the plan and agrees to call Transplant Coordinator with any further questions or concerns.

## 2019-05-24 NOTE — COMMITTEE REVIEW
Thoracic Committee Review Note     Evaluation Date: 5/9/2019  Committee Review Date: 5/24/2019    Organ being evaluated for: Heart    Transplant Phase: Evaluation  Transplant Status: Active    Transplant Coordinator: Ashley Domingo  Transplant MD:  Lorena Watkins       Referring Physician:     Primary Diagnosis:  NICM       Committee Review Members:  Cardiology LURDES ANTON, ARAVIND, Edgardo Elias, ARAVIND, Kalli Abad, RN   Nurse Practitioner - Adult Health Kalli Purcell, APRN CNP   Nutrition Hillary Hannon, MARLON   Pharmacist Iftikhar Araujo, [facility]    - Clinical Abby Fernandez, [facility]   Transplant Andrei Silva MD, Ashley Araujo MD, Bennett Rene MD, Moisés Rockwell MD, Coleen Peters PsyD, Sirena Weems MD, Ashley Domingo RN, Enoc Lincoln MD, Cricket Dominguez, ARAVIND, Bhavin Wilcox MD, Lorena Watkins MD, Romaine Mondragon MD, Nichelle Steward MD       Transplant Eligibility: Disabling heart disease on maximal medical therapy    Committee Review Decision: Approved    Relative Contraindications: None    Absolute Contraindications: None    Committee Chair Ashley Araujo MD verbally attested to the committee's decision.    Committee Discussion Details: JL, 64 y/o, blood group O, NICM who had HM III placed and is doing well.  He had intermittent marijuana smoking prior to the VAD but has proved abstinence with negative tox screens.  Concerns for cirrhosis on scans, consulted hepatology and cleared for transplant.  Patient with osteoporosis, team recommends endocrine consult for management prior to transplant.  Patient should get updated vaccines at this time.  List as status 4.

## 2019-05-28 LAB
MDC_IDC_EPISODE_DTM: NORMAL
MDC_IDC_EPISODE_DURATION: 12 S
MDC_IDC_EPISODE_DURATION: 13 S
MDC_IDC_EPISODE_DURATION: 13 S
MDC_IDC_EPISODE_DURATION: 14 S
MDC_IDC_EPISODE_DURATION: 15 S
MDC_IDC_EPISODE_DURATION: 15 S
MDC_IDC_EPISODE_DURATION: 18 S
MDC_IDC_EPISODE_DURATION: 27 S
MDC_IDC_EPISODE_DURATION: 28 S
MDC_IDC_EPISODE_DURATION: 35 S
MDC_IDC_EPISODE_DURATION: 55 S
MDC_IDC_EPISODE_DURATION: 76 S
MDC_IDC_EPISODE_ID: NORMAL
MDC_IDC_EPISODE_TYPE: NORMAL
MDC_IDC_EPISODE_VENDOR_TYPE: NORMAL
MDC_IDC_LEAD_IMPLANT_DT: NORMAL
MDC_IDC_LEAD_LOCATION: NORMAL
MDC_IDC_LEAD_LOCATION_DETAIL_1: NORMAL
MDC_IDC_LEAD_MFG: NORMAL
MDC_IDC_LEAD_MODEL: NORMAL
MDC_IDC_LEAD_POLARITY_TYPE: NORMAL
MDC_IDC_LEAD_SERIAL: NORMAL
MDC_IDC_MSMT_BATTERY_DTM: NORMAL
MDC_IDC_MSMT_BATTERY_REMAINING_LONGEVITY: 144 MO
MDC_IDC_MSMT_BATTERY_REMAINING_PERCENTAGE: 100 %
MDC_IDC_MSMT_BATTERY_STATUS: NORMAL
MDC_IDC_MSMT_CAP_CHARGE_DTM: NORMAL
MDC_IDC_MSMT_CAP_CHARGE_TIME: 10.4 S
MDC_IDC_MSMT_CAP_CHARGE_TYPE: NORMAL
MDC_IDC_MSMT_LEADCHNL_RV_IMPEDANCE_VALUE: 391 OHM
MDC_IDC_MSMT_LEADCHNL_RV_PACING_THRESHOLD_AMPLITUDE: 1.3 V
MDC_IDC_MSMT_LEADCHNL_RV_PACING_THRESHOLD_PULSEWIDTH: 0.5 MS
MDC_IDC_PG_IMPLANT_DTM: NORMAL
MDC_IDC_PG_MFG: NORMAL
MDC_IDC_PG_MODEL: NORMAL
MDC_IDC_PG_SERIAL: NORMAL
MDC_IDC_PG_TYPE: NORMAL
MDC_IDC_SESS_CLINIC_NAME: NORMAL
MDC_IDC_SESS_DTM: NORMAL
MDC_IDC_SESS_TYPE: NORMAL
MDC_IDC_SET_BRADY_LOWRATE: 40 {BEATS}/MIN
MDC_IDC_SET_BRADY_MODE: NORMAL
MDC_IDC_SET_LEADCHNL_RV_PACING_AMPLITUDE: 3 V
MDC_IDC_SET_LEADCHNL_RV_PACING_POLARITY: NORMAL
MDC_IDC_SET_LEADCHNL_RV_PACING_PULSEWIDTH: 0.5 MS
MDC_IDC_SET_LEADCHNL_RV_SENSING_ADAPTATION_MODE: NORMAL
MDC_IDC_SET_LEADCHNL_RV_SENSING_POLARITY: NORMAL
MDC_IDC_SET_LEADCHNL_RV_SENSING_SENSITIVITY: 0.6 MV
MDC_IDC_SET_ZONE_DETECTION_INTERVAL: 250 MS
MDC_IDC_SET_ZONE_DETECTION_INTERVAL: 300 MS
MDC_IDC_SET_ZONE_DETECTION_INTERVAL: 375 MS
MDC_IDC_SET_ZONE_TYPE: NORMAL
MDC_IDC_SET_ZONE_VENDOR_TYPE: NORMAL
MDC_IDC_STAT_BRADY_DTM_END: NORMAL
MDC_IDC_STAT_BRADY_DTM_START: NORMAL
MDC_IDC_STAT_BRADY_RV_PERCENT_PACED: 0 %
MDC_IDC_STAT_EPISODE_RECENT_COUNT: 0
MDC_IDC_STAT_EPISODE_RECENT_COUNT: 8
MDC_IDC_STAT_EPISODE_RECENT_COUNT_DTM_END: NORMAL
MDC_IDC_STAT_EPISODE_RECENT_COUNT_DTM_START: NORMAL
MDC_IDC_STAT_EPISODE_TYPE: NORMAL
MDC_IDC_STAT_EPISODE_VENDOR_TYPE: NORMAL
MDC_IDC_STAT_TACHYTHERAPY_ATP_DELIVERED_RECENT: 0
MDC_IDC_STAT_TACHYTHERAPY_ATP_DELIVERED_TOTAL: 0
MDC_IDC_STAT_TACHYTHERAPY_RECENT_DTM_END: NORMAL
MDC_IDC_STAT_TACHYTHERAPY_RECENT_DTM_START: NORMAL
MDC_IDC_STAT_TACHYTHERAPY_SHOCKS_ABORTED_RECENT: 0
MDC_IDC_STAT_TACHYTHERAPY_SHOCKS_ABORTED_TOTAL: 0
MDC_IDC_STAT_TACHYTHERAPY_SHOCKS_DELIVERED_RECENT: 0
MDC_IDC_STAT_TACHYTHERAPY_SHOCKS_DELIVERED_TOTAL: 0
MDC_IDC_STAT_TACHYTHERAPY_TOTAL_DTM_END: NORMAL
MDC_IDC_STAT_TACHYTHERAPY_TOTAL_DTM_START: NORMAL

## 2019-05-28 NOTE — TELEPHONE ENCOUNTER
Patient Update 05/24/19:    Spoke to patient today and discussed the following information:    Patient's case was discussed and he was approved for transplant, will list as status 4.  Information was sent to insurance company for listing.  Patient aware that this could take appx. 1 week to process.  Patient verbalized understanding of the plan and agrees to call Transplant Coordinator with any further questions or concerns.

## 2019-05-30 ENCOUNTER — CARE COORDINATION (OUTPATIENT)
Dept: CARDIOLOGY | Facility: CLINIC | Age: 64
End: 2019-05-30

## 2019-05-30 DIAGNOSIS — I50.22 CHRONIC SYSTOLIC CONGESTIVE HEART FAILURE (H): Primary | ICD-10-CM

## 2019-05-30 NOTE — PROGRESS NOTES
"Transplant Listing 05/30/19    Danielito Chu was approved for activation on the heart transplant waitlist by the Heart Transplant Committee and added to the heart waitlist on 05/30/19  See committee review documentation for further information.    ABO verification complete:  YES  2nd ABO verified by:  STEPHY Moncada listing status:  4  Ht Readings from Last 2 Encounters:   04/22/19 1.772 m (5' 9.75\")   04/22/19 1.753 m (5' 9\")     Wt Readings from Last 2 Encounters:   04/22/19 100.5 kg (221 lb 8 oz)   04/22/19 99.8 kg (220 lb)     ABO:  O    On-call transplant coordinators, patient's LVAD coordinator, transplant LPN, immunology and PFR notified.          "

## 2019-05-31 DIAGNOSIS — Z94.1 HEART REPLACED BY TRANSPLANT (H): Primary | ICD-10-CM

## 2019-05-31 LAB — INR PPP: 2.3

## 2019-06-03 ENCOUNTER — ANTICOAGULATION THERAPY VISIT (OUTPATIENT)
Dept: ANTICOAGULATION | Facility: CLINIC | Age: 64
End: 2019-06-03

## 2019-06-03 DIAGNOSIS — Z95.811 LVAD (LEFT VENTRICULAR ASSIST DEVICE) PRESENT (H): ICD-10-CM

## 2019-06-03 NOTE — PROGRESS NOTES
ANTICOAGULATION FOLLOW-UP CLINIC VISIT    Patient Name:  Danielito Chu  Date:  6/3/2019  Contact Type:  Telephone    SUBJECTIVE:  Patient Findings     Comments:   Patient has an appointment on  so will recheck an INR then.         Clinical Outcomes     Comments:   Patient has an appointment on  so will recheck an INR then.            OBJECTIVE    INR   Date Value Ref Range Status   2019 2.3  Final     Factor 2 Assay   Date Value Ref Range Status   10/27/2018 70 60 - 140 % Final     Comment:     The Factor 2 activity level is not a screening test for the Prothrombin 54678   mutation.         ASSESSMENT / PLAN  No question data found.  Anticoagulation Summary  As of 6/3/2019    INR goal:   2.0-3.0   TTR:   77.7 % (5.3 mo)   INR used for dosin.3 (2019)   Warfarin maintenance plan:   4 mg (2 mg x 2) every Tue; 3 mg (2 mg x 1.5) all other days   Full warfarin instructions:   4 mg every Tue; 3 mg all other days   Weekly warfarin total:   22 mg   Plan last modified:   [facility]donez, [facility] (5/15/2019)   Next INR check:   2019   Priority:   INR   Target end date:   Indefinite    Indications    LVAD (left ventricular assist device) present (H) [Z95.811]             Anticoagulation Episode Summary     INR check location:       Preferred lab:       Send INR reminders to:    [facility]    Comments:   [place] [facility] [address] [facility] [phone]/Fax [phone]  LVAD plced 18 ASA 81mg Daily Jantoven 2mg tabs Lab [phone]/[phone]  Call pt at [phone] Emphasize next INR date with pt++Send email each time++  Pt is home .      Anticoagulation Care Providers     Provider Role Specialty Phone number    Lorena Watkins MD [facility] Cardiology [phone]            See the Encounter Report to view Anticoagulation Flowsheet and Dosing Calendar (Go to Encounters tab in chart review, and find the Anticoagulation Therapy Visit)    Spoke with patient.      Bri Becerril, RN

## 2019-06-07 ENCOUNTER — ANTICOAGULATION THERAPY VISIT (OUTPATIENT)
Dept: ANTICOAGULATION | Facility: CLINIC | Age: 64
End: 2019-06-07

## 2019-06-07 ENCOUNTER — ANCILLARY PROCEDURE (OUTPATIENT)
Dept: CARDIOLOGY | Facility: CLINIC | Age: 64
End: 2019-06-07
Payer: COMMERCIAL

## 2019-06-07 ENCOUNTER — OFFICE VISIT (OUTPATIENT)
Dept: CARDIOLOGY | Facility: CLINIC | Age: 64
End: 2019-06-07
Attending: INTERNAL MEDICINE
Payer: COMMERCIAL

## 2019-06-07 VITALS
HEART RATE: 47 BPM | HEIGHT: 70 IN | BODY MASS INDEX: 32.64 KG/M2 | SYSTOLIC BLOOD PRESSURE: 82 MMHG | OXYGEN SATURATION: 97 % | WEIGHT: 228 LBS

## 2019-06-07 DIAGNOSIS — I50.23 ACUTE ON CHRONIC SYSTOLIC HEART FAILURE (H): Primary | ICD-10-CM

## 2019-06-07 DIAGNOSIS — Z94.1 HEART REPLACED BY TRANSPLANT (H): ICD-10-CM

## 2019-06-07 DIAGNOSIS — I50.22 CHRONIC SYSTOLIC CONGESTIVE HEART FAILURE (H): Primary | ICD-10-CM

## 2019-06-07 DIAGNOSIS — Z95.811 LVAD (LEFT VENTRICULAR ASSIST DEVICE) PRESENT (H): ICD-10-CM

## 2019-06-07 DIAGNOSIS — I50.22 CHRONIC SYSTOLIC CONGESTIVE HEART FAILURE (H): ICD-10-CM

## 2019-06-07 DIAGNOSIS — Z95.810 CARDIAC DEFIBRILLATOR IN SITU: ICD-10-CM

## 2019-06-07 LAB
ALBUMIN SERPL-MCNC: 3.9 G/DL (ref 3.4–5)
ALP SERPL-CCNC: 97 U/L (ref 40–150)
ALT SERPL W P-5'-P-CCNC: 24 U/L (ref 0–70)
ANION GAP SERPL CALCULATED.3IONS-SCNC: 5 MMOL/L (ref 3–14)
AST SERPL W P-5'-P-CCNC: 26 U/L (ref 0–45)
BILIRUB SERPL-MCNC: 0.8 MG/DL (ref 0.2–1.3)
BUN SERPL-MCNC: 17 MG/DL (ref 7–30)
CALCIUM SERPL-MCNC: 9 MG/DL (ref 8.5–10.1)
CHLORIDE SERPL-SCNC: 99 MMOL/L (ref 94–109)
CO2 SERPL-SCNC: 30 MMOL/L (ref 20–32)
CREAT SERPL-MCNC: 0.98 MG/DL (ref 0.66–1.25)
D DIMER PPP FEU-MCNC: 1.5 UG/ML FEU (ref 0–0.5)
ERYTHROCYTE [DISTWIDTH] IN BLOOD BY AUTOMATED COUNT: 16.1 % (ref 10–15)
GFR SERPL CREATININE-BSD FRML MDRD: 81 ML/MIN/{1.73_M2}
GLUCOSE SERPL-MCNC: 106 MG/DL (ref 70–99)
HCT VFR BLD AUTO: 47.8 % (ref 40–53)
HGB BLD-MCNC: 15.5 G/DL (ref 13.3–17.7)
INR PPP: 2.22 (ref 0.86–1.14)
LDH SERPL L TO P-CCNC: 244 U/L (ref 85–227)
MCH RBC QN AUTO: 29.8 PG (ref 26.5–33)
MCHC RBC AUTO-ENTMCNC: 32.4 G/DL (ref 31.5–36.5)
MCV RBC AUTO: 92 FL (ref 78–100)
PLATELET # BLD AUTO: 294 10E9/L (ref 150–450)
POTASSIUM SERPL-SCNC: 3.6 MMOL/L (ref 3.4–5.3)
PROT SERPL-MCNC: 8.5 G/DL (ref 6.8–8.8)
RBC # BLD AUTO: 5.21 10E12/L (ref 4.4–5.9)
SODIUM SERPL-SCNC: 134 MMOL/L (ref 133–144)
WBC # BLD AUTO: 9.5 10E9/L (ref 4–11)

## 2019-06-07 PROCEDURE — 80053 COMPREHEN METABOLIC PANEL: CPT | Performed by: INTERNAL MEDICINE

## 2019-06-07 PROCEDURE — 85027 COMPLETE CBC AUTOMATED: CPT | Performed by: INTERNAL MEDICINE

## 2019-06-07 PROCEDURE — 99215 OFFICE O/P EST HI 40 MIN: CPT | Mod: 25 | Performed by: INTERNAL MEDICINE

## 2019-06-07 PROCEDURE — 36415 COLL VENOUS BLD VENIPUNCTURE: CPT | Performed by: INTERNAL MEDICINE

## 2019-06-07 PROCEDURE — 93750 INTERROGATION VAD IN PERSON: CPT | Mod: ZF | Performed by: INTERNAL MEDICINE

## 2019-06-07 PROCEDURE — G0463 HOSPITAL OUTPT CLINIC VISIT: HCPCS | Mod: ZF

## 2019-06-07 PROCEDURE — 80307 DRUG TEST PRSMV CHEM ANLYZR: CPT | Performed by: INTERNAL MEDICINE

## 2019-06-07 PROCEDURE — 83615 LACTATE (LD) (LDH) ENZYME: CPT | Performed by: INTERNAL MEDICINE

## 2019-06-07 PROCEDURE — 85379 FIBRIN DEGRADATION QUANT: CPT | Performed by: INTERNAL MEDICINE

## 2019-06-07 PROCEDURE — 85610 PROTHROMBIN TIME: CPT | Performed by: INTERNAL MEDICINE

## 2019-06-07 PROCEDURE — 40000358 ZZHCL STATISTIC DRUG SCREEN MULTIPLE (METRO): Performed by: INTERNAL MEDICINE

## 2019-06-07 ASSESSMENT — ENCOUNTER SYMPTOMS
NERVOUS/ANXIOUS: 0
FATIGUE: 0
WEIGHT LOSS: 0
POLYDIPSIA: 0
BOWEL INCONTINENCE: 0
CONSTIPATION: 0
POLYPHAGIA: 0
BLOATING: 1
ABDOMINAL PAIN: 0
DECREASED CONCENTRATION: 0
JAUNDICE: 0
NIGHT SWEATS: 0
WEIGHT GAIN: 1
DIARRHEA: 0
NAUSEA: 0
ALTERED TEMPERATURE REGULATION: 0
DECREASED APPETITE: 0
RECTAL PAIN: 0
DEPRESSION: 0
PANIC: 0
BLOOD IN STOOL: 0
VOMITING: 0
INCREASED ENERGY: 0
CHILLS: 0
HALLUCINATIONS: 0
INSOMNIA: 0
HEARTBURN: 0
FEVER: 0

## 2019-06-07 ASSESSMENT — PAIN SCALES - GENERAL: PAINLEVEL: NO PAIN (0)

## 2019-06-07 ASSESSMENT — MIFFLIN-ST. JEOR: SCORE: 1827.51

## 2019-06-07 NOTE — PATIENT INSTRUCTIONS
Medications:  1. No changes.    Follow-up:  1. Please make an appointment to see a nurse practitioner with labs beforehand in 3 months.  2. Please make an appointment to see Dr. Watkins with labs and device check beforehand in 6 months.     Instructions:  1. Keep up the good work.  Pay attention to your diet and keep exercising and staying active.     Page the VAD Coordinator on call if you gain more than 3 lb in a day or 5 in a week. Please also page if you feel unwell or have alarms.     Great to see you in clinic today. To Page the VAD Coordinator on call, dial [phone] option #4 and ask to speak to the VAD coordinator on call.

## 2019-06-07 NOTE — PROGRESS NOTES
June 7, 2019    HPI:   Mr. Danielito Chu is a 63 yr old male with a history of NICM (LVEF at dong of 15%) s/p ICD (4/2017),  who underwent HM3 LVAD implant on 12/5/18 as DT ([facility], liver disease).     Hospital course was notable for postoperative flolan and pressor support but was ultimately weaned from this and has been doing better and better every week.     He has been walking longer and longer distances and has more energy than he has had in years.     He can walk several blocks without stopping.  No orthopnea, PND, chest pain, palpitations, swelling. Appetite is great.     No driveline erythema. No fevers or chills. No bleeding. No focal deficits.    He has gained 10 -15 lbs recently and has been eating more. He is walking on the treadmill for an hour at a time.       PAST MEDICAL HISTORY:  Past Medical History:   Diagnosis Date     Acute systolic congestive heart failure (H) 4/5/2017     Diabetes (H)      HTN (hypertension)      Hyperlipidemia      Liver disease      LVAD (left ventricular assist device) present (H)      3 12/18     Marijuana abuse      Mitral regurgitation      Nocturnal oxygen desaturation 3/29/2018     Nonischemic cardiomyopathy (H) 4/5/2017     SHIRLEY (obstructive sleep apnea)      Pacemaker 04/07/2017    ICD 4/7/17     Situational mixed anxiety and depressive disorder        FAMILY HISTORY:  Reviewed , no changes     SOCIAL HISTORY:  Social History     Reviewed, no changes     CURRENT MEDICATIONS:     Current Outpatient Medications   Medication Sig Dispense Refill     allopurinol (ZYLOPRIM) 100 MG tablet Take 2 tablets (200 mg) by mouth daily 60 tablet 1     aspirin (ASA) 81 MG chewable tablet Take 1 tablet (81 mg) by mouth daily 120 tablet 0     Blood Glucose Monitoring Suppl (BLOOD GLUCOSE MONITOR SYSTEM) w/Device KIT three times a week       bumetanide (BUMEX) 1 MG tablet Take 2 tablets (2 mg) by mouth 2 times daily 90 tablet 3     colchicine (COLCRYS)  "0.6 MG tablet Take 1 tablet (0.6 mg) by mouth 2 times daily Labs due in June for monitoring. 60 tablet 1     losartan (COZAAR) 25 MG tablet Take 1 tablet (25 mg) by mouth daily 90 tablet 3     melatonin 3 MG tablet Take 1 tablet (3 mg) by mouth At Bedtime 30 tablet 0     metoprolol succinate ER (TOPROL XL) 25 MG 24 hr tablet Take 0.5 tablets (12.5 mg) by mouth daily 45 tablet 1     potassium chloride ER (K-DUR/KLOR-CON M) 10 MEQ CR tablet Take 3 tablets (30 mEq) by mouth daily 48 tablet 11     predniSONE (DELTASONE) 10 MG tablet Take 1 tablet (10 mg) by mouth daily for 2 days 2 tablet 0     spironolactone (ALDACTONE) 25 MG tablet Take 1.5 tablets (37.5 mg) by mouth daily 145 tablet 3     traZODone (DESYREL) 100 MG tablet TAKE 1 TABLET BY MOUTH EVERY DAY AT BEDTIME AS NEEDED FOR SLEEP 30 tablet 1     vitamin B1 (THIAMINE) 100 MG tablet Take 1 tablet (100 mg) by mouth daily 30 tablet 0     warfarin (COUMADIN) 2 MG tablet Take 3 mg PO daily at 6 pm until next INR check, then dose per INR goal 2-3 150 tablet 1     enoxaparin (LOVENOX) 100 MG/ML syringe Inject 100 mg (1 ml) every 12 hours as directed by the Anticoagulation Clinic. (Patient not taking: Reported on 6/7/2019) 10 Syringe 1     ROS:   Constitutional: No fever, chills, or sweats.  Weight has increased and he has gained back muscle  ENT: No visual disturbance, ear ache, epistaxis, sore throat.   Allergies/Immunologic: Negative.   Respiratory: No cough, hemoptysis.   Cardiovascular: As per HPI.   GI: No nausea, vomiting, hematemesis, melena, or hematochezia.   : No urinary frequency, dysuria, or hematuria.   Integument: Negative.   Psychiatric: As per HPI.  Neuro: Negative.   Endocrinology: Negative.   Musculoskeletal:gout is under control     EXAM:  BP (!) 82/0 (BP Location: Right arm, Patient Position: Chair, Cuff Size: Adult Regular)   Pulse (!) 47   Ht 1.765 m (5' 9.5\")   Wt 103.4 kg (228 lb)   SpO2 97%   BMI 33.19 kg/m     General: appears " comfortable, alert and articulate  Head: normocephalic, atraumatic  Eyes: anicteric sclera, EOMI  Neck: no adenopathy  Orophyarynx: moist mucosa, no lesions, dentition intact  Heart: regular, S1/S2, grade 2/6 JENNY best heard at LLSB, no gallop or rub, JVP 8 cm   Lungs: clear, no rales or wheezing  Abdomen: soft, non-tender, bowel sounds present, no hepatomegaly  Extremities: no clubbing, cyanosis or LE edema  Neurological: normal speech and affect, no gross motor deficits    VAD Interrogation: VAD interrogation reviewed with VAD coordinator. Agree with findings. No PI events, power spikes, speed drops, or other findings suspicious of pump malfunction noted.     Labs:  Reviewed INR, BMP, albumin     A-wave: 8 mmHg  V-wave: 5 mmHg  Mean: 5 mmHg   HR: 57 bpm  RV  Systolic: 30 mmHg  End Diastolic: 5 mmHg  HR: 54 bpm    PA  Systolic:30 mmHg  Diasotlic: 17 mmHg  Mean: 21 mmHg  HR: 53 bpm      PCW  A-wave: 10 mmHg  V-wave: 12 mmHg  Mean: 10 mmHg  HR: 52 bpm      Cardiac Output  CO Timmy: 3.57 L/min  CI Timmy: 1.64 L/min/m2  CO TD: 3.43 L/min  CI TD: 1.58 L/min/m2    Vitals  BP: 88 mmHg / 67 mmHg  SpO2: 97 %  PA Stat: 57.6 %    Diagnostics:    Interpretation Summary  LVAD ramp study with settings ranging from 5300 to 5600 rpm. Please refer to  the EPIC report for measurements performed at different LVAD speed settings.     Baseline speed was 5500 rpm. At baseline setting there was moderate left  ventricular dilatation with LVIDd 6.4 cm. Right ventricular dilatation was  moderate and right ventricular systolic function appears mild-moderately  reduced. Ventricular septum deviates to the right.  Limited Doppler of inflow/outflow cannula.  Aortic valve does not open.  Mild to moderate aortic insufficiency is continuous and worse compared to  study done 12/11/18.      Assessment and Plan:   Mr. Danielito Chu is a 63 yr old male with a history of NICM (LVEF at dong of 15%) s/p ICD (4/2017),  who underwent HM3 LVAD implant on  12/5/18 as DT (some history of mariajuana smoking, liver disease). He is now cleared for transplant and is on the list (status 4)     Chronic systolic heart failure secondary to NICM  Stage D  NYHA Class II  ACEi/ARB:  Yes, losartan   BB: yes   Aldosterone antagonist: yes  SCD prophylaxis:  ICD  Fluid status:  euvolemic -  MAP: at goal of 65-80   Anti-coag goal: ASA 81, INR 2-3     Other:     NSVT controlled, has ICD, now on beta blocker     Transplant candidacy:on as status 4     Obesity: he is going to watch calorie intake, exercise more - still eligible for TX now    Return to clinic in 3 months-sooner if transplant testing warrants a sooner return    Lorena Watkins MD       CC  Patient Care Team:  Bryan Orellana MD as PCP - General  Lorena Watkins MD as MD (Cardiology)  Deysi Mattson APRN CNP as Nurse Practitioner (Cardiology)  Candido Mendez MD as MD (Gastroenterology)  Edgardo Elias RN as VAD Coordinator (Cardiology)  Earlene Giron APRN CNP as Nurse Practitioner (Clinical Cardiac Electrophysiology)  Daniel Zavala MD as MD (Clinical Cardiac Electrophysiology)  Arely Wilson MD as MD (INTERNAL MEDICINE - ENDOCRINOLOGY, DIABETES & METABOLISM)  Lorena Watkins MD as Referring Physician (Cardiology)  SELF, REFERRED

## 2019-06-07 NOTE — LETTER
6/7/2019      RE: Danielito Chu  [address] [place] [address]       Dear Colleague,    Thank you for the opportunity to participate in the care of your patient, Danielito Chu, at the [facility]. Please see a copy of my visit note below.      June 7, 2019    HPI:   Mr. Danielito Chu is a 63 yr old male with a history of NICM (LVEF at dong of 15%) s/p ICD (4/2017),  who underwent HM3 LVAD implant on 12/5/18 as DT (some history of [facility] smoking, liver disease).     Hospital course was notable for postoperative flolan and pressor support but was ultimately weaned from this and has been doing better and better every week.     He has been walking longer and longer distances and has more energy than he has had in years.     He can walk several blocks without stopping.  No orthopnea, PND, chest pain, palpitations, swelling. Appetite is great.     No driveline erythema. No fevers or chills. No bleeding. No focal deficits.    He has gained 10 -15 lbs recently and has been eating more. He is walking on the treadmill for an hour at a time.       PAST MEDICAL HISTORY:  Past Medical History:   Diagnosis Date     Acute systolic congestive heart failure (H) 4/5/2017     Diabetes (H)      HTN (hypertension)      Hyperlipidemia      Liver disease      LVAD (left ventricular assist device) present (H)      3 12/18     Marijuana abuse      Mitral regurgitation      Nocturnal oxygen desaturation 3/29/2018     Nonischemic cardiomyopathy (H) 4/5/2017     SHIRLEY (obstructive sleep apnea)      Pacemaker 04/07/2017    ICD 4/7/17     Situational mixed anxiety and depressive disorder        FAMILY HISTORY:  Reviewed , no changes     SOCIAL HISTORY:  Social History     Reviewed, no changes     CURRENT MEDICATIONS:     Current Outpatient Medications   Medication Sig Dispense Refill     allopurinol (ZYLOPRIM) 100 MG tablet Take 2 tablets (200 mg) by mouth  daily 60 tablet 1     aspirin (ASA) 81 MG chewable tablet Take 1 tablet (81 mg) by mouth daily 120 tablet 0     Blood Glucose Monitoring Suppl (BLOOD GLUCOSE MONITOR SYSTEM) w/Device KIT three times a week       bumetanide (BUMEX) 1 MG tablet Take 2 tablets (2 mg) by mouth 2 times daily 90 tablet 3     colchicine (COLCRYS) 0.6 MG tablet Take 1 tablet (0.6 mg) by mouth 2 times daily Labs due in June for monitoring. 60 tablet 1     losartan (COZAAR) 25 MG tablet Take 1 tablet (25 mg) by mouth daily 90 tablet 3     melatonin 3 MG tablet Take 1 tablet (3 mg) by mouth At Bedtime 30 tablet 0     metoprolol succinate ER (TOPROL XL) 25 MG 24 hr tablet Take 0.5 tablets (12.5 mg) by mouth daily 45 tablet 1     potassium chloride ER (K-DUR/KLOR-CON M) 10 MEQ CR tablet Take 3 tablets (30 mEq) by mouth daily 48 tablet 11     predniSONE (DELTASONE) 10 MG tablet Take 1 tablet (10 mg) by mouth daily for 2 days 2 tablet 0     spironolactone (ALDACTONE) 25 MG tablet Take 1.5 tablets (37.5 mg) by mouth daily 145 tablet 3     traZODone (DESYREL) 100 MG tablet TAKE 1 TABLET BY MOUTH EVERY DAY AT BEDTIME AS NEEDED FOR SLEEP 30 tablet 1     vitamin B1 (THIAMINE) 100 MG tablet Take 1 tablet (100 mg) by mouth daily 30 tablet 0     warfarin (COUMADIN) 2 MG tablet Take 3 mg PO daily at 6 pm until next INR check, then dose per INR goal 2-3 150 tablet 1     enoxaparin (LOVENOX) 100 MG/ML syringe Inject 100 mg (1 ml) every 12 hours as directed by the Anticoagulation Clinic. (Patient not taking: Reported on 6/7/2019) 10 Syringe 1     ROS:   Constitutional: No fever, chills, or sweats.  Weight has increased and he has gained back muscle  ENT: No visual disturbance, ear ache, epistaxis, sore throat.   Allergies/Immunologic: Negative.   Respiratory: No cough, hemoptysis.   Cardiovascular: As per HPI.   GI: No nausea, vomiting, hematemesis, melena, or hematochezia.   : No urinary frequency, dysuria, or hematuria.   Integument: Negative.  "  Psychiatric: As per HPI.  Neuro: Negative.   Endocrinology: Negative.   Musculoskeletal:gout is under control     EXAM:  BP (!) 82/0 (BP Location: Right arm, Patient Position: Chair, Cuff Size: Adult Regular)   Pulse (!) 47   Ht 1.765 m (5' 9.5\")   Wt 103.4 kg (228 lb)   SpO2 97%   BMI 33.19 kg/m      General: appears comfortable, alert and articulate  Head: normocephalic, atraumatic  Eyes: anicteric sclera, EOMI  Neck: no adenopathy  Orophyarynx: moist mucosa, no lesions, dentition intact  Heart: regular, S1/S2, grade 2/6 JENNY best heard at LLSB, no gallop or rub, JVP 8 cm   Lungs: clear, no rales or wheezing  Abdomen: soft, non-tender, bowel sounds present, no hepatomegaly  Extremities: no clubbing, cyanosis or LE edema  Neurological: normal speech and affect, no gross motor deficits    VAD Interrogation: VAD interrogation reviewed with VAD coordinator. Agree with findings. No PI events, power spikes, speed drops, or other findings suspicious of pump malfunction noted.     Labs:  Reviewed INR, BMP, albumin     A-wave: 8 mmHg  V-wave: 5 mmHg  Mean: 5 mmHg   HR: 57 bpm  RV  Systolic: 30 mmHg  End Diastolic: 5 mmHg  HR: 54 bpm    PA  Systolic:30 mmHg  Diasotlic: 17 mmHg  Mean: 21 mmHg  HR: 53 bpm      PCW  A-wave: 10 mmHg  V-wave: 12 mmHg  Mean: 10 mmHg  HR: 52 bpm      Cardiac Output  CO Timmy: 3.57 L/min  CI Timmy: 1.64 L/min/m2  CO TD: 3.43 L/min  CI TD: 1.58 L/min/m2    Vitals  BP: 88 mmHg / 67 mmHg  SpO2: 97 %  PA Stat: 57.6 %    Diagnostics:    Interpretation Summary  LVAD ramp study with settings ranging from 5300 to 5600 rpm. Please refer to  the EPIC report for measurements performed at different LVAD speed settings.     Baseline speed was 5500 rpm. At baseline setting there was moderate left  ventricular dilatation with LVIDd 6.4 cm. Right ventricular dilatation was  moderate and right ventricular systolic function appears mild-moderately  reduced. Ventricular septum deviates to the right.  Limited " Doppler of inflow/outflow cannula.  Aortic valve does not open.  Mild to moderate aortic insufficiency is continuous and worse compared to  study done 12/11/18.      Assessment and Plan:   Mr. Danielito Chu is a 63 yr old male with a history of NICM (LVEF at dong of 15%) s/p ICD (4/2017),  who underwent HM3 LVAD implant on 12/5/18 as DT (some history of [facility]ia[facility]ana smoking, liver disease). He is now cleared for transplant and is on the list (status 4)     Chronic systolic heart failure secondary to NICM  Stage D  NYHA Class II  ACEi/ARB:  Yes, losartan   BB: yes   Aldosterone antagonist: yes  SCD prophylaxis:  ICD  Fluid status:  euvolemic -  MAP: at goal of 65-80   Anti-coag goal: ASA 81, INR 2-3     Other:     NSVT controlled, has ICD, now on beta blocker     Transplant candidacy:on as status 4     Obesity: he is going to watch calorie intake, exercise more - still eligible for TX now    Return to clinic in 3 months-sooner if transplant testing warrants a sooner return    Lorena Watkins MD       CC  Patient Care Team:  Bryan Orellana MD as PCP - General  Lorena Watkins MD as MD (Cardiology)  Deysi Mattson APRN CNP as Nurse Practitioner (Cardiology)  Candido Mendez MD as MD (Gastroenterology)  Edgardo Elias RN as VAD Coordinator (Cardiology)  Earlene Giron APRN CNP as Nurse Practitioner (Clinical Cardiac Electrophysiology)  Daniel Zavala MD as MD (Clinical Cardiac Electrophysiology)  Arely Wilson MD as MD (INTERNAL MEDICINE - ENDOCRINOLOGY, DIABETES & METABOLISM)  Lorena Watkins MD as Referring Physician (Cardiology)  SELF, REFERRED

## 2019-06-07 NOTE — PATIENT INSTRUCTIONS
It was a pleasure to see you in clinic today.  Please do not hesitate to call with any questions or concerns.  You are scheduled for a remote transmission on 9/9/19.  We look forward to seeing you in clinic at your next device check in 6 months.    Earlene Nagy RN, BSN  Electrophysiology Nurse Clinician  [facility]    During Business Hours Please Call:  [phone]  After Hours Please Call:  [phone] - select option #4 and ask for job code [phone]

## 2019-06-07 NOTE — NURSING NOTE
1). PUMP DATA  Primary controller serial number: [id number]    HM 3:   Flow: 4.9 L/min,    Speed: 5500 RPMs,     PI: 2.8 ,  Power: 4.6 [place], Hct: 48     Primary controller   Back up battery: Patient use: 13, Replace in: 24  Months     Data downloaded: Yes   Equipment and driveline assessed for damage: Yes     Back up : Serial number: [id number]  Back up battery: Patient use: 11 Replace in: 23  Months  Programmed settings identical to the settings on the primary controller :Yes      Education complete: Yes   Charge the BACKUP controller s backup battery every 6 months  Perform a self test on BACKUP every 6 months  Change the MPU s batteries every 6 months:Yes      2). ALARMS  Alarms reported by patient since last pump evaluation: Yes, low voltage alarms; patient promptly changes power source.  Alarms or other finding noted during pump data history and alarm download: Yes, low voltage alarms.  Patient reported that sometimes he sleeps with his batteries. Instructed patient to only sleep while on wall power.  Patient stated that he knew that but struggles to make it to the bathroom in time during the night. Re-educated patient that he should only sleep on wall power for safety reasons; pt verbalized understanding.  Occasional PI events. No speed drops noted.     Action Taken:  Reviewed data with patient: Yes      3). DRESSING CHANGE / DRIVELINE ASSESSMENT  Dressing change completed today: No  Appearance of Driveline site: Clean, dry, intact with weekly dressing.     Driveline stabilization: Method: [employer]ion  [ Teaching reinforced on need for stabilization of Driveline. ]    4).Pt. Education    D:  Pt education provided.  I:  Pt s with Heart[employer] educated on the following topics:  1. Reviewed Care of Batteries.  a. When to rotate.  b. When to calibrate.  c. When they .  d. When to clean Contacts.  2. Reviewed MPU   a. When to change batteries.  3. Reviewed Backup Controller  a. When to  charge.  b. When to do self-test.  4. Inspected pt. s wearables  5. Pt given a handout with a Maintenance schedule.  A:  Pt verbalized understanding.  P:  VAD Coordinator available for questions or concerns.  Will continue VAD education.

## 2019-06-07 NOTE — PROGRESS NOTES
ANTICOAGULATION FOLLOW-UP CLINIC VISIT    Patient Name:  Danielito Chu  Date:  2019  Contact Type:  Telephone    SUBJECTIVE:         OBJECTIVE    INR   Date Value Ref Range Status   2019 2.22 (H) 0.86 - 1.14 Final     Factor 2 Assay   Date Value Ref Range Status   10/27/2018 70 60 - 140 % Final     Comment:     The Factor 2 activity level is not a screening test for the Prothrombin 08539   mutation.         ASSESSMENT / PLAN  INR assessment THER    Recheck INR In: 1 WEEK    INR Location Clinic      Anticoagulation Summary  As of 2019    INR goal:   2.0-3.0   TTR:   78.7 % (5.6 mo)   INR used for dosin.22 (2019)   Warfarin maintenance plan:   4 mg (2 mg x 2) every Tue; 3 mg (2 mg x 1.5) all other days   Full warfarin instructions:   4 mg every Tue; 3 mg all other days   Weekly warfarin total:   22 mg   No change documented:   Jeanette Bryant RN   Plan last modified:   Giovanni Ordonez, [facility] (5/15/2019)   Next INR check:   2019   Priority:   INR   Target end date:   Indefinite    Indications    LVAD (left ventricular assist device) present (H) [Z95.811]             Anticoagulation Episode Summary     INR check location:       Preferred lab:       Send INR reminders to:   [facility]    Comments:   [place] [facility] [phone]/Fax [phone]  LVAD plced 18 ASA 81mg Daily Jantoven 2mg tabs Lab [phone]/[phone]  Call pt at [phone] Emphasize next INR date with pt++Send email each time++  Pt is home .      Anticoagulation Care Providers     Provider Role Specialty Phone number    Lorena Watkins MD Responsible Cardiology [phone]            See the Encounter Report to view Anticoagulation Flowsheet and Dosing Calendar (Go to Encounters tab in chart review, and find the Anticoagulation Therapy Visit)    Left message for patient with results and dosing recommendations. Asked patient to call back to report any missed  doses, falls, signs and symptoms of bleeding or clotting, any changes in health, medication, or diet. Asked patient to call back with any questions or concerns.    Patient had LVAD placed on:   12/5/18  Patient's current Aspirin dose: 81 mg daily  LVAD Protocol followed: Yes     Jeanette Bryant RN

## 2019-06-10 LAB
ACETAMINOPHEN QUAL: NEGATIVE
AMANTADINE: NEGATIVE
AMITRIPTYLINE QUAL: NEGATIVE
AMOXAPINE: NEGATIVE
AMPHETAMINES QUAL: NEGATIVE
ATROPINE: NEGATIVE
BENZODIAZ UR QL: NEGATIVE
BUPROPION QUAL: NEGATIVE
CAFFEINE QUAL: POSITIVE
CANNABINOIDS UR QL SCN: NEGATIVE
CARBAMAZEPINE QUAL: NEGATIVE
CHLORPHENIRAMINE: NEGATIVE
CHLORPROMAZINE: NEGATIVE
CITALOPRAM QUAL: NEGATIVE
CLOMIPRAMINE QUAL: NEGATIVE
COCAINE QUAL: NEGATIVE
COCAINE UR QL: NEGATIVE
CODEINE QUAL: NEGATIVE
DESIPRAMINE QUAL: NEGATIVE
DEXTROMETHORPHAN: NEGATIVE
DIPHENHYDRAMINE: NEGATIVE
DOXEPIN/METABOLITE: NEGATIVE
DOXYLAMINE: NEGATIVE
EPHEDRINE OR PSEUDO: NEGATIVE
FENTANYL QUAL: NEGATIVE
FLUOXETINE AND METAB: NEGATIVE
HYDROCODONE QUAL: NEGATIVE
HYDROMORPHONE QUAL: NEGATIVE
IBUPROFEN QUAL: NEGATIVE
IMIPRAMINE QUAL: NEGATIVE
KETAMINE QUAL: NEGATIVE
LAMOTRIGINE QUAL: NEGATIVE
LIDOCAIN SPEC QL: NEGATIVE
LOXAPINE: NEGATIVE
MAPROTYLINE: NEGATIVE
MDMA QUAL: NEGATIVE
MEPERIDINE QUAL: NEGATIVE
METHAMPHETAMINE: NEGATIVE
METHODONE QUAL: NEGATIVE
MIRTAZAPINE QUAL: NEGATIVE
MORPHINE QUAL: NEGATIVE
NICOTINE: NEGATIVE
NORTRIPTYLINE QUAL: NEGATIVE
OLANZAPINE QUAL: NEGATIVE
OPIATES UR QL SCN: NEGATIVE
OXYCODONE QUAL: NEGATIVE
PENTAZOCINE: NEGATIVE
PHENCYCLIDINE QUAL: NEGATIVE
PHENTERMINE: NEGATIVE
PROPOFOL QUAL: NEGATIVE
PROPOXPHENE QUAL: NEGATIVE
PROPRANOLOL QUAL: NEGATIVE
PYRILAMINE: NEGATIVE
QUETIAPINE METAB QUAL: NEGATIVE
SALICYLATE QUAL: NEGATIVE
SERTRALINE QUAL: NEGATIVE
THEOBROMINE: POSITIVE
TOPIRAMATE QUAL: NEGATIVE
TRAMADOL QUAL: NEGATIVE
TRIMIPRAMINE QUAL: NEGATIVE
VENLAFAXINE QUAL: NEGATIVE

## 2019-06-11 ENCOUNTER — CARE COORDINATION (OUTPATIENT)
Dept: CARDIOLOGY | Facility: CLINIC | Age: 64
End: 2019-06-11

## 2019-06-11 LAB
MDC_IDC_LEAD_IMPLANT_DT: NORMAL
MDC_IDC_LEAD_LOCATION: NORMAL
MDC_IDC_LEAD_LOCATION_DETAIL_1: NORMAL
MDC_IDC_LEAD_MFG: NORMAL
MDC_IDC_LEAD_MODEL: NORMAL
MDC_IDC_LEAD_POLARITY_TYPE: NORMAL
MDC_IDC_LEAD_SERIAL: NORMAL
MDC_IDC_MSMT_BATTERY_STATUS: NORMAL
MDC_IDC_MSMT_CAP_CHARGE_DTM: NORMAL
MDC_IDC_MSMT_CAP_CHARGE_ENERGY: 0 J
MDC_IDC_MSMT_CAP_CHARGE_TIME: 0 S
MDC_IDC_MSMT_CAP_CHARGE_TIME: 10.39 S
MDC_IDC_MSMT_CAP_CHARGE_TYPE: NORMAL
MDC_IDC_MSMT_CAP_CHARGE_TYPE: NORMAL
MDC_IDC_MSMT_LEADCHNL_RV_IMPEDANCE_VALUE: 397 OHM
MDC_IDC_MSMT_LEADCHNL_RV_PACING_THRESHOLD_AMPLITUDE: 1.3 V
MDC_IDC_MSMT_LEADCHNL_RV_PACING_THRESHOLD_PULSEWIDTH: 0.5 MS
MDC_IDC_MSMT_LEADCHNL_RV_SENSING_INTR_AMPL: 4.5 MV
MDC_IDC_PG_IMPLANT_DTM: NORMAL
MDC_IDC_PG_MFG: NORMAL
MDC_IDC_PG_MODEL: NORMAL
MDC_IDC_PG_SERIAL: NORMAL
MDC_IDC_PG_TYPE: NORMAL
MDC_IDC_SESS_CLINIC_NAME: NORMAL
MDC_IDC_SESS_DTM: NORMAL
MDC_IDC_SESS_TYPE: NORMAL
MDC_IDC_SET_BRADY_HYSTRATE: NORMAL
MDC_IDC_SET_BRADY_LOWRATE: 40 {BEATS}/MIN
MDC_IDC_SET_BRADY_MODE: NORMAL
MDC_IDC_SET_LEADCHNL_RV_PACING_AMPLITUDE: 3 V
MDC_IDC_SET_LEADCHNL_RV_PACING_ANODE_ELECTRODE_1: NORMAL
MDC_IDC_SET_LEADCHNL_RV_PACING_ANODE_LOCATION_1: NORMAL
MDC_IDC_SET_LEADCHNL_RV_PACING_CAPTURE_MODE: NORMAL
MDC_IDC_SET_LEADCHNL_RV_PACING_CATHODE_ELECTRODE_1: NORMAL
MDC_IDC_SET_LEADCHNL_RV_PACING_CATHODE_LOCATION_1: NORMAL
MDC_IDC_SET_LEADCHNL_RV_PACING_POLARITY: NORMAL
MDC_IDC_SET_LEADCHNL_RV_PACING_PULSEWIDTH: 0.5 MS
MDC_IDC_SET_LEADCHNL_RV_SENSING_ADAPTATION_MODE: NORMAL
MDC_IDC_SET_LEADCHNL_RV_SENSING_ANODE_ELECTRODE_1: NORMAL
MDC_IDC_SET_LEADCHNL_RV_SENSING_ANODE_LOCATION_1: NORMAL
MDC_IDC_SET_LEADCHNL_RV_SENSING_CATHODE_ELECTRODE_1: NORMAL
MDC_IDC_SET_LEADCHNL_RV_SENSING_CATHODE_LOCATION_1: NORMAL
MDC_IDC_SET_LEADCHNL_RV_SENSING_POLARITY: NORMAL
MDC_IDC_SET_LEADCHNL_RV_SENSING_SENSITIVITY: 0.6 MV
MDC_IDC_SET_ZONE_DETECTION_INTERVAL: 250 MS
MDC_IDC_SET_ZONE_DETECTION_INTERVAL: 300 MS
MDC_IDC_SET_ZONE_DETECTION_INTERVAL: 375 MS
MDC_IDC_SET_ZONE_TYPE: NORMAL
MDC_IDC_SET_ZONE_VENDOR_TYPE: NORMAL
MDC_IDC_STAT_BRADY_RV_PERCENT_PACED: 1 %
MDC_IDC_STAT_EPISODE_RECENT_COUNT: 10
MDC_IDC_STAT_EPISODE_RECENT_COUNT: 7
MDC_IDC_STAT_EPISODE_RECENT_COUNT_DTM_END: NORMAL
MDC_IDC_STAT_EPISODE_RECENT_COUNT_DTM_END: NORMAL
MDC_IDC_STAT_EPISODE_RECENT_COUNT_DTM_START: NORMAL
MDC_IDC_STAT_EPISODE_RECENT_COUNT_DTM_START: NORMAL
MDC_IDC_STAT_EPISODE_TOTAL_COUNT: 19
MDC_IDC_STAT_EPISODE_TOTAL_COUNT: 19
MDC_IDC_STAT_EPISODE_TOTAL_COUNT: 306
MDC_IDC_STAT_EPISODE_TOTAL_COUNT_DTM_END: NORMAL
MDC_IDC_STAT_EPISODE_TYPE: NORMAL
MDC_IDC_STAT_EPISODE_VENDOR_TYPE: NORMAL
MDC_IDC_STAT_TACHYTHERAPY_ATP_DELIVERED_RECENT: 0
MDC_IDC_STAT_TACHYTHERAPY_ATP_DELIVERED_TOTAL: 0
MDC_IDC_STAT_TACHYTHERAPY_RECENT_DTM_END: NORMAL
MDC_IDC_STAT_TACHYTHERAPY_RECENT_DTM_START: NORMAL
MDC_IDC_STAT_TACHYTHERAPY_SHOCKS_ABORTED_RECENT: 0
MDC_IDC_STAT_TACHYTHERAPY_SHOCKS_ABORTED_TOTAL: 0
MDC_IDC_STAT_TACHYTHERAPY_SHOCKS_DELIVERED_RECENT: 0
MDC_IDC_STAT_TACHYTHERAPY_SHOCKS_DELIVERED_TOTAL: 0
MDC_IDC_STAT_TACHYTHERAPY_TOTAL_DTM_END: NORMAL
SA1 CELL: NORMAL
SA1 COMMENTS: NORMAL
SA1 HI RISK ABY: NORMAL
SA1 MOD RISK ABY: NORMAL
SA1 TEST METHOD: NORMAL
SA2 CELL: NORMAL
SA2 COMMENTS: NORMAL
SA2 HI RISK ABY UA: NORMAL
SA2 MOD RISK ABY: NORMAL
SA2 TEST METHOD: NORMAL
UNACCEPTABLE ANTIGEN: NORMAL
UNOS CPRA: 0

## 2019-06-11 NOTE — PROGRESS NOTES
Called patient/caregiver to check in 24 weeks post discharge. Pt reports VAD parameters normal and weight creeping up slowly but his exercising and nutrition have been so much better. Reviewed medications and answered any questions. Patient reports sleeping fine and still some depression and anxiety since being home with LVAD. Fabiano is seeing a therapist in [place]. Patient is fully able to move around the house and care for him/herself independently.     Discussed specific new problems/stressors since being discharged from the hospital: none at this time. Empathized with patient and reviewed coping strategies: enlisting support from friends and love ones, attending patient and caregiver support groups, reviewing LVAD educational materials to reinforce knowledge, and talking about concerns with family/care providers/trusted others. Encouraged pt to page VAD Coordinator with any issues or questions. Pt verbalizes understanding.

## 2019-06-19 ENCOUNTER — CARE COORDINATION (OUTPATIENT)
Dept: CARDIOLOGY | Facility: CLINIC | Age: 64
End: 2019-06-19

## 2019-06-19 DIAGNOSIS — Z95.811 LVAD (LEFT VENTRICULAR ASSIST DEVICE) PRESENT (H): Primary | ICD-10-CM

## 2019-06-19 RX ORDER — AMOXICILLIN 500 MG/1
2000 CAPSULE ORAL
Qty: 4 CAPSULE | Refills: 3 | Status: SHIPPED | OUTPATIENT
Start: 2019-06-19 | End: 2020-01-01

## 2019-06-19 NOTE — PROGRESS NOTES
D: Pt called to ask about dental procedures with LVAD. He is wondering if his dental clinic can call VAD Coordinator for more information.   I: Pt instructed to take 2g amoxicillin, 1hr before dental appointments. He was instructed to never let anyone stop his warfarin or ASA without talking to a member of the VAD team. He was also instructed to have his dentist page the on-call VAD Coordinator.   A: Pt verbalized understanding.   P: Will await dentist call.

## 2019-06-20 ENCOUNTER — CARE COORDINATION (OUTPATIENT)
Dept: CARDIOLOGY | Facility: CLINIC | Age: 64
End: 2019-06-20

## 2019-06-20 NOTE — PROGRESS NOTES
Fabiano called in requesting dental antibiotics for a cleaning. He is checking into the costs of doing an implant. I advised him to let me know well before he will have any procedure that requires a lower INR so his team can come up with a anticoagulation bridging plan. He agreed to this plan. He also said he will be travelling again to [place]  From 7/27 to 8/12. I will send him a TSA travel letter and I will contact a VAD coordinator at a hospital near [place], [place].

## 2019-06-26 DIAGNOSIS — Z95.811 LVAD (LEFT VENTRICULAR ASSIST DEVICE) PRESENT (H): ICD-10-CM

## 2019-06-26 RX ORDER — WARFARIN SODIUM 2 MG/1
TABLET ORAL
Qty: 150 TABLET | Refills: 11 | Status: SHIPPED | OUTPATIENT
Start: 2019-06-26 | End: 2020-01-01

## 2019-06-28 ENCOUNTER — ANTICOAGULATION THERAPY VISIT (OUTPATIENT)
Dept: ANTICOAGULATION | Facility: CLINIC | Age: 64
End: 2019-06-28

## 2019-06-28 DIAGNOSIS — Z95.811 LVAD (LEFT VENTRICULAR ASSIST DEVICE) PRESENT (H): ICD-10-CM

## 2019-06-28 LAB — INR PPP: 2.4 (ref 0.9–1.1)

## 2019-06-28 NOTE — PROGRESS NOTES
ANTICOAGULATION FOLLOW-UP CLINIC VISIT    Patient Name:  Danielito Chu  Date:  2019  Contact Type:  Telephone    SUBJECTIVE:         OBJECTIVE    INR   Date Value Ref Range Status   2019 2.4 (A) 0.9 - 1.1 Final     Factor 2 Assay   Date Value Ref Range Status   10/27/2018 70 60 - 140 % Final     Comment:     The Factor 2 activity level is not a screening test for the Prothrombin 80808   mutation.         ASSESSMENT / PLAN  INR assessment THER    Recheck INR In: 2 WEEKS    INR Location Outside lab      Anticoagulation Summary  As of 2019    INR goal:   2.0-3.0   TTR:   81.0 % (6.3 mo)   INR used for dosin.4 (2019)   Warfarin maintenance plan:   4 mg (2 mg x 2) every Tue; 3 mg (2 mg x 1.5) all other days   Full warfarin instructions:   4 mg every Tue; 3 mg all other days   Weekly warfarin total:   22 mg   No change documented:   Jeanette Bryant RN   Plan last modified:   Giovanni Ordonez, [facility] (5/15/2019)   Next INR check:   2019   Priority:   INR   Target end date:   Indefinite    Indications    LVAD (left ventricular assist device) present (H) [Z95.811]             Anticoagulation Episode Summary     INR check location:       Preferred lab:       Send INR reminders to:   [facility]    Comments:   [place] [facility] [phone]/Fax [phone]  LVAD plced 18 ASA 81mg Daily Jantoven 2mg tabs Lab [phone]/[phone]  Call pt at [phone] Emphasize next INR date with pt++Send email each time++  Pt is home .      Anticoagulation Care Providers     Provider Role Specialty Phone number    Lorena Watkins MD Responsible Cardiology [phone]            See the Encounter Report to view Anticoagulation Flowsheet and Dosing Calendar (Go to Encounters tab in chart review, and find the Anticoagulation Therapy Visit)    Spoke with Fabiano.  He reports no changes in health, diet, medications.  He states he does not need an e-mail  this week, since there are no changes in his dosing.      Patient had LVAD placed on:   12/5/18  Patient's current Aspirin dose: 81 mg daily  LVAD Protocol followed: Yes     Jeanette Bryant RN

## 2019-07-01 ENCOUNTER — TRANSFERRED RECORDS (OUTPATIENT)
Dept: HEALTH INFORMATION MANAGEMENT | Facility: CLINIC | Age: 64
End: 2019-07-01

## 2019-07-02 ENCOUNTER — CARE COORDINATION (OUTPATIENT)
Dept: CARDIOLOGY | Facility: CLINIC | Age: 64
End: 2019-07-02

## 2019-07-02 NOTE — PROGRESS NOTES
Fabiano called to talk about some of his concerns about his [employer] insurance coverage transitioning to Medicare in September 2020. He is worried that [employer] will change the rules and cut him off early before Medicare starts. I attempted to allay Fabiano's fears about losing coverage. I asked Rosmery ROGERS, our  to give him a call to answer his insurance questions.

## 2019-07-09 ENCOUNTER — TELEPHONE (OUTPATIENT)
Dept: CARE COORDINATION | Facility: CLINIC | Age: 64
End: 2019-07-09

## 2019-07-09 DIAGNOSIS — I50.23 ACUTE ON CHRONIC SYSTOLIC HEART FAILURE (H): ICD-10-CM

## 2019-07-09 NOTE — TELEPHONE ENCOUNTER
VAD/Transplant  received call from pt requesting clarification on a letter he received from clinic. Letter indicated that pt needed to schedule an appointment with a . Writer got pt's call back number and informed him writer would look into this and get back to him. Pt evaluated by his primary , Pratima Mon, on 5/9/2019 as part of evaluation to move towards heart transplant listing. Evaluation indicates no psychosocial concerns in moving forward with heart transplant. Writer collaborated with pt's transplant coordinator and confirmed that pt does not need to make a Social Work appt. Writer contacted pt and told him to disregard letter. Pt expressed no concerns at this time.

## 2019-07-11 RX ORDER — COLCHICINE 0.6 MG/1
0.6 TABLET ORAL 2 TIMES DAILY
Qty: 180 TABLET | Refills: 1 | Status: SHIPPED | OUTPATIENT
Start: 2019-07-11 | End: 2020-01-01

## 2019-07-12 ENCOUNTER — ANTICOAGULATION THERAPY VISIT (OUTPATIENT)
Dept: ANTICOAGULATION | Facility: CLINIC | Age: 64
End: 2019-07-12

## 2019-07-12 DIAGNOSIS — Z95.811 LVAD (LEFT VENTRICULAR ASSIST DEVICE) PRESENT (H): ICD-10-CM

## 2019-07-12 LAB — INR PPP: 2.3

## 2019-07-12 NOTE — PROGRESS NOTES
ANTICOAGULATION FOLLOW-UP CLINIC VISIT    Patient Name:  Danielito Chu  Date:  2019  Contact Type:  Telephone    SUBJECTIVE:         OBJECTIVE    INR   Date Value Ref Range Status   2019 2.3  Final     Factor 2 Assay   Date Value Ref Range Status   10/27/2018 70 60 - 140 % Final     Comment:     The Factor 2 activity level is not a screening test for the Prothrombin 69529   mutation.         ASSESSMENT / PLAN  No question data found.  Anticoagulation Summary  As of 2019    INR goal:   2.0-3.0   TTR:   82.3 % (6.7 mo)   INR used for dosin.3 (2019)   Warfarin maintenance plan:   4 mg (2 mg x 2) every Tue; 3 mg (2 mg x 1.5) all other days   Full warfarin instructions:   4 mg every Tue; 3 mg all other days   Weekly warfarin total:   22 mg   Plan last modified:   Giovanni [facility]donez [facility] (5/15/2019)   Next INR check:   2019   Priority:   INR   Target end date:   Indefinite    Indications    LVAD (left ventricular assist device) present (H) [Z95.811]             Anticoagulation Episode Summary     INR check location:       Preferred lab:       Send INR reminders to:   [facility]    Comments:   [place] [facility] [phone]/Fax [phone]  LVAD plced 18 ASA 81mg Daily Jantoven 2mg tabs Lab [phone]/[phone]  Call pt at [phone] Emphasize next INR date with pt++Send email each time++  Pt is home .      Anticoagulation Care Providers     Provider Role Specialty Phone number    Lorena Watkins MD [facility] Cardiology [phone]            See the Encounter Report to view Anticoagulation Flowsheet and Dosing Calendar (Go to Encounters tab in chart review, and find the Anticoagulation Therapy Visit)    Spoke with patient.     Bri Becerril RN

## 2019-07-13 ENCOUNTER — CARE COORDINATION (OUTPATIENT)
Dept: CARDIOLOGY | Facility: CLINIC | Age: 64
End: 2019-07-13

## 2019-07-13 DIAGNOSIS — I50.22 CHRONIC SYSTOLIC CONGESTIVE HEART FAILURE (H): Primary | ICD-10-CM

## 2019-07-13 NOTE — PROGRESS NOTES
I talked with Fabiano. He is seeing a urologist here at the  for a urethral stricture. He also has a dermatology need and wants to see someone here at the . I said this was possible but coordinating the two appointments may be difficult. Fabiano said he still wants to try to see the dermatologist at [facility].  A referral for dermatology was entered. J

## 2019-07-15 ENCOUNTER — TELEPHONE (OUTPATIENT)
Dept: TRANSPLANT | Facility: CLINIC | Age: 64
End: 2019-07-15

## 2019-07-15 NOTE — TELEPHONE ENCOUNTER
Patient Update 07/15/19:    Spoke to patient today and discussed the following information:    Patient will be traveling at the end of July through Mid August.  Patient has his house in [place] and will be there for repairs that need to be made.      Discussed vaccinations, reminded patient to review with PCP.  Patient has letter that was sent in regards to this and will follow up.    Patient had some questions about the waitlist and time accrual.  Reviewed listing criteria and answered questions.      Patient will be status 7 at the time of travel, he is aware that a letter will be sent with these changes.  He will be status 4 on his return.  Patient verbalized understanding of the plan and agrees to call Transplant Coordinator with any further questions or concerns.

## 2019-07-15 NOTE — TELEPHONE ENCOUNTER
Patient Call: General  Route to LPN    Reason for call: PT called back hes traveling July 27th-Aug 12th and needs to be inactive on the list.    Call back needed? Yes    Return Call Needed  Same as documented in contacts section  When to return call?: Same day: Route High Priority

## 2019-07-16 ENCOUNTER — MEDICAL CORRESPONDENCE (OUTPATIENT)
Dept: HEALTH INFORMATION MANAGEMENT | Facility: CLINIC | Age: 64
End: 2019-07-16

## 2019-07-16 ENCOUNTER — TELEPHONE (OUTPATIENT)
Dept: UROLOGY | Facility: CLINIC | Age: 64
End: 2019-07-16

## 2019-07-16 NOTE — TELEPHONE ENCOUNTER
"[facility] Call Center    Phone Message    May a detailed message be left on voicemail: yes    Reason for Call: Order(s): Other:   Reason for requested: RUG/VCUG  Date needed: 08/19/19  Provider name: Dr García    New pt, protocols say \"NEW patients need to have a RUG/VCUG prior to their appointment\" Please place order and call pt to schedule.       Action Taken: Message routed to:  [facility]  Urology    "

## 2019-07-17 NOTE — TELEPHONE ENCOUNTER
MEDICAL RECORDS REQUEST   [facility]  Urology Clinic  [address] [place] [address]  PHONE: [phone]  Fax: [phone]        FUTURE VISIT INFORMATION                                                   Danielito Chu, : 1955 scheduled for future visit at [facility]    APPOINTMENT INFORMATION:    Date: 19     Provider:  Joey Whiteside MD     Reason for Visit/Diagnosis: Urethral stricture     REFERRAL INFORMATION:    Referring provider:  KAREN SANCHEZ    Specialty: NP     Referring providers clinic: [facility]     Clinic contact number:  [phone]    RECORDS REQUESTED FOR VISIT                                                     NOTES  STATUS/DETAILS   OFFICE NOTE from referring provider  yes   OFFICE NOTE from other specialist  yes   DISCHARGE SUMMARY from hospital  no   DISCHARGE REPORT from the ER  no   OPERATIVE REPORT  no   MEDICATION LIST  no   URETHRAL STRICTURE     RUG (IMAGES & REPORT)  in process   VCUG  (IMAGES & REPORT)  in process       PRE-VISIT CHECKLIST      Record collection complete Yes- All recs in Epic    Appointment appropriately scheduled           (right time/right provider) Yes   MyChart activation If no, please explain: In process    Questionnaire complete If no, please explain: In process      Completed by: Charlotte Fields

## 2019-07-18 ENCOUNTER — CARE COORDINATION (OUTPATIENT)
Dept: UROLOGY | Facility: CLINIC | Age: 64
End: 2019-07-18

## 2019-07-19 DIAGNOSIS — Q64.32 CONGENITAL STRICTURE OF URETHRA: Primary | ICD-10-CM

## 2019-07-26 ENCOUNTER — ANTICOAGULATION THERAPY VISIT (OUTPATIENT)
Dept: ANTICOAGULATION | Facility: CLINIC | Age: 64
End: 2019-07-26

## 2019-07-26 DIAGNOSIS — Z95.811 LVAD (LEFT VENTRICULAR ASSIST DEVICE) PRESENT (H): ICD-10-CM

## 2019-07-26 LAB — INR PPP: 2.5

## 2019-07-26 NOTE — PROGRESS NOTES
ANTICOAGULATION FOLLOW-UP CLINIC VISIT    Patient Name:  Danielito Chu  Date:  2019  Contact Type:  Telephone    SUBJECTIVE:         OBJECTIVE    INR   Date Value Ref Range Status   2019 2.5  Final     Factor 2 Assay   Date Value Ref Range Status   10/27/2018 70 60 - 140 % Final     Comment:     The Factor 2 activity level is not a screening test for the Prothrombin 43678   mutation.         ASSESSMENT / PLAN  INR assessment THER    Recheck INR In: 2 WEEKS    INR Location Outside lab      Anticoagulation Summary  As of 2019    INR goal:   2.0-3.0   TTR:   83.5 % (7.2 mo)   INR used for dosin.5 (2019)   Warfarin maintenance plan:   4 mg (2 mg x 2) every Tue; 3 mg (2 mg x 1.5) all other days   Full warfarin instructions:   4 mg every Tue; 3 mg all other days   Weekly warfarin total:   22 mg   Plan last modified:   Giovanni Ordonez, [facility] (5/15/2019)   Next INR check:   2019   Priority:   INR   Target end date:   Indefinite    Indications    LVAD (left ventricular assist device) present (H) [Z95.811]             Anticoagulation Episode Summary     INR check location:       Preferred lab:       Send INR reminders to:   [facility]    Comments:   [place] [facility] [phone]/Fax [phone]  LVAD plced 18 ASA 81mg Daily Jantoven 2mg tabs Lab [phone]/[phone]  Call pt at [phone] Emphasize next INR date with pt++Send email each time++  Pt is home .      Anticoagulation Care Providers     Provider Role Specialty Phone number    Lorena Watkins MD Responsible Cardiology [phone]            See the Encounter Report to view Anticoagulation Flowsheet and Dosing Calendar (Go to Encounters tab in chart review, and find the Anticoagulation Therapy Visit)    Spoke with patient.  Patient had LVAD placed on:  18  Patient's current Aspirin dose: 81  LVAD Protocol followed:  Yes  Lorena Kamara RN

## 2019-07-30 ENCOUNTER — DOCUMENTATION ONLY (OUTPATIENT)
Dept: CARE COORDINATION | Facility: CLINIC | Age: 64
End: 2019-07-30

## 2019-07-30 ENCOUNTER — CARE COORDINATION (OUTPATIENT)
Dept: CARDIOLOGY | Facility: CLINIC | Age: 64
End: 2019-07-30

## 2019-07-30 NOTE — PROGRESS NOTES
Received page from from ARAVIND Tillman at [facility]. She is asking for an update on getting a letter to clear him for conscious sedation for tooth extractions. Surgery date is not set, pending Cardiology approval. His INR would need to be <3, no holding coumadin required. He would have a caregiver there. They need a letter faxed to [phone] (ph: [phone]).     Writer will connect with pt's primary VAD Coordinator to obtain status update. Of note, pt's primary cardiologist is out of the office for a week.

## 2019-08-01 DIAGNOSIS — I50.22 CHRONIC SYSTOLIC CONGESTIVE HEART FAILURE (H): ICD-10-CM

## 2019-08-01 DIAGNOSIS — Z95.811 LVAD (LEFT VENTRICULAR ASSIST DEVICE) PRESENT (H): ICD-10-CM

## 2019-08-01 RX ORDER — BUMETANIDE 1 MG/1
2 TABLET ORAL 2 TIMES DAILY
Qty: 180 TABLET | Refills: 3 | Status: SHIPPED | OUTPATIENT
Start: 2019-08-01 | End: 2019-08-09

## 2019-08-08 ENCOUNTER — PRE VISIT (OUTPATIENT)
Dept: UROLOGY | Facility: CLINIC | Age: 64
End: 2019-08-08

## 2019-08-08 NOTE — TELEPHONE ENCOUNTER
Reason for visit: Urethral stricture consult      Relevant information: pt has an LVAD    Records/imaging/labs/orders: all appointments scheduled    Pt called: yes    At Rooming: flow/pvr

## 2019-08-09 ENCOUNTER — ANTICOAGULATION THERAPY VISIT (OUTPATIENT)
Dept: ANTICOAGULATION | Facility: CLINIC | Age: 64
End: 2019-08-09

## 2019-08-09 DIAGNOSIS — Z95.811 LVAD (LEFT VENTRICULAR ASSIST DEVICE) PRESENT (H): ICD-10-CM

## 2019-08-09 DIAGNOSIS — I50.22 CHRONIC SYSTOLIC CONGESTIVE HEART FAILURE (H): ICD-10-CM

## 2019-08-09 RX ORDER — BUMETANIDE 1 MG/1
2 TABLET ORAL 2 TIMES DAILY
Qty: 360 TABLET | Refills: 1 | Status: SHIPPED | OUTPATIENT
Start: 2019-08-09 | End: 2019-09-10

## 2019-08-09 NOTE — PROGRESS NOTES
8/9/19  Spoke to Fabiano.  He is traveling.  Will get an INR when he returns on Tuesday.  Updated calendar.  Continues on 81mg of ASA daily.    Fady Avelar RN

## 2019-08-13 ENCOUNTER — ANTICOAGULATION THERAPY VISIT (OUTPATIENT)
Dept: ANTICOAGULATION | Facility: CLINIC | Age: 64
End: 2019-08-13

## 2019-08-13 DIAGNOSIS — Z95.811 LVAD (LEFT VENTRICULAR ASSIST DEVICE) PRESENT (H): ICD-10-CM

## 2019-08-13 LAB — INR PPP: 2.5

## 2019-08-13 NOTE — PROGRESS NOTES
ANTICOAGULATION FOLLOW-UP CLINIC VISIT    Patient Name:  Danielito Chu  Date:  2019  Contact Type:  Telephone    SUBJECTIVE:         OBJECTIVE    INR   Date Value Ref Range Status   2019 2.5  Final     Factor 2 Assay   Date Value Ref Range Status   10/27/2018 70 60 - 140 % Final     Comment:     The Factor 2 activity level is not a screening test for the Prothrombin 63857   mutation.         ASSESSMENT / PLAN  INR assessment THER    Recheck INR In: 2 WEEKS    INR Location Outside lab      Anticoagulation Summary  As of 2019    INR goal:   2.0-3.0   TTR:   84.7 % (7.8 mo)   INR used for dosin.5 (2019)   Warfarin maintenance plan:   4 mg (2 mg x 2) every Tue; 3 mg (2 mg x 1.5) all other days   Full warfarin instructions:   4 mg every Tue; 3 mg all other days   Weekly warfarin total:   22 mg   Plan last modified:   Giovanni Ordonez, [facility] (5/15/2019)   Next INR check:   2019   Priority:   INR   Target end date:   Indefinite    Indications    LVAD (left ventricular assist device) present (H) [Z95.811]             Anticoagulation Episode Summary     INR check location:       Preferred lab:       Send INR reminders to:   [facility]    Comments:   [place] [facility] [phone]/Fax [phone]  LVAD plced 18 ASA 81mg Daily Jantoven 2mg tabs Lab [phone]  Call pt at [phone] Emphasize next INR date with pt++Send email each time++  Pt is home .      Anticoagulation Care Providers     Provider Role Specialty Phone number    Lorena Watkins MD Responsible Cardiology [phone]            See the Encounter Report to view Anticoagulation Flowsheet and Dosing Calendar (Go to Encounters tab in chart review, and find the Anticoagulation Therapy Visit)  Spoke with patient.  Patient had LVAD placed on:   18  Patient's current Aspirin dose: 81mg  LVAD Protocol followed: yes   Lorena Kamara RN

## 2019-08-14 ENCOUNTER — TELEPHONE (OUTPATIENT)
Dept: TRANSPLANT | Facility: CLINIC | Age: 64
End: 2019-08-14

## 2019-08-14 NOTE — TELEPHONE ENCOUNTER
Pt would like to connect with his pre-tx coordinator. The pt would like to discuss his transplant status and what is needed to move forward.

## 2019-08-15 DIAGNOSIS — I50.23 ACUTE ON CHRONIC SYSTOLIC HEART FAILURE (H): ICD-10-CM

## 2019-08-18 NOTE — TELEPHONE ENCOUNTER
allopurinol (ZYLOPRIM) 100 MG tablet      Last Written Prescription Date:  4/4/19  Last Fill Quantity: 60,   # refills: 1  Last Office Visit : 6/7/19  Future Office visit:  8/28/19    Routing refill request to provider for review/approval because:  Medication not on the Cardiology refill protocol.

## 2019-08-19 ENCOUNTER — ANCILLARY PROCEDURE (OUTPATIENT)
Dept: GENERAL RADIOLOGY | Facility: CLINIC | Age: 64
End: 2019-08-19
Attending: UROLOGY
Payer: COMMERCIAL

## 2019-08-19 ENCOUNTER — OFFICE VISIT (OUTPATIENT)
Dept: DERMATOLOGY | Facility: CLINIC | Age: 64
End: 2019-08-19
Attending: INTERNAL MEDICINE
Payer: COMMERCIAL

## 2019-08-19 ENCOUNTER — ANTICOAGULATION THERAPY VISIT (OUTPATIENT)
Dept: ANTICOAGULATION | Facility: CLINIC | Age: 64
End: 2019-08-19

## 2019-08-19 ENCOUNTER — OFFICE VISIT (OUTPATIENT)
Dept: UROLOGY | Facility: CLINIC | Age: 64
End: 2019-08-19
Attending: NURSE PRACTITIONER
Payer: COMMERCIAL

## 2019-08-19 ENCOUNTER — PRE VISIT (OUTPATIENT)
Dept: UROLOGY | Facility: CLINIC | Age: 64
End: 2019-08-19

## 2019-08-19 VITALS — WEIGHT: 241.5 LBS | HEIGHT: 70 IN | BODY MASS INDEX: 34.57 KG/M2

## 2019-08-19 DIAGNOSIS — Z95.811 LVAD (LEFT VENTRICULAR ASSIST DEVICE) PRESENT (H): ICD-10-CM

## 2019-08-19 DIAGNOSIS — Z94.1 HEART REPLACED BY TRANSPLANT (H): ICD-10-CM

## 2019-08-19 DIAGNOSIS — Q64.32 CONGENITAL STRICTURE OF URETHRA: ICD-10-CM

## 2019-08-19 DIAGNOSIS — Q64.33 CONGENITAL MEATAL STENOSIS: Primary | ICD-10-CM

## 2019-08-19 DIAGNOSIS — R21 RASH OF PENIS: Primary | ICD-10-CM

## 2019-08-19 LAB — INR PPP: 2.05 (ref 0.86–1.14)

## 2019-08-19 RX ORDER — TACROLIMUS 1 MG/G
OINTMENT TOPICAL 2 TIMES DAILY
Qty: 30 G | Refills: 3 | Status: SHIPPED | OUTPATIENT
Start: 2019-08-19

## 2019-08-19 RX ORDER — ALLOPURINOL 100 MG/1
200 TABLET ORAL DAILY
Qty: 180 TABLET | Refills: 0 | Status: SHIPPED | OUTPATIENT
Start: 2019-08-19 | End: 2019-11-20

## 2019-08-19 RX ORDER — LIDOCAINE HYDROCHLORIDE 20 MG/ML
JELLY TOPICAL ONCE
Status: COMPLETED | OUTPATIENT
Start: 2019-08-19 | End: 2019-08-19

## 2019-08-19 RX ADMIN — LIDOCAINE HYDROCHLORIDE: 20 JELLY TOPICAL at 09:58

## 2019-08-19 ASSESSMENT — MIFFLIN-ST. JEOR: SCORE: 1888.75

## 2019-08-19 ASSESSMENT — PAIN SCALES - GENERAL
PAINLEVEL: NO PAIN (0)
PAINLEVEL: NO PAIN (0)

## 2019-08-19 ASSESSMENT — PATIENT HEALTH QUESTIONNAIRE - PHQ9: SUM OF ALL RESPONSES TO PHQ QUESTIONS 1-9: 4

## 2019-08-19 NOTE — LETTER
2019       RE: Danielito Chu  [address] [place] [address]     Dear Colleague,    Thank you for referring your patient, Danielito Chu, to the [facility] UROLOGY AND INST FOR PROSTATE AND UROLOGIC CANCERS at [facility]. Please see a copy of my visit note below.    Urology Clinic    Joey García MD  Date of Service: 2019     Name: Danielito Chu  MRN: [id number]  Age: 64 year old  : 1955  Referring provider: Minoo Lopez     Assessment and Plan:  A 64 year old-year-old gentleman with meatal stenosis, buried penis, and skin depigmentation on the penis.     - We discussed options of urethral dilation vs. Meatotomy. He prefers self-dilation as he has done that before. I reassured him that dilating just a couple of cm into urethra will not cause UTI.   - We also discussed that his weight is contributing to the buried penis. He was advised to work towards and weight loss. If he loses weight and has excess skin, this could be removed surgically. In the interim, he will need to pull the head of the penis out and push his belly back.   - He will use the tacrolimus ointment prescribed today by dermatology.   - He did not have benefit in erectile dysfunction with Viagra 25 mg. He could discuss increasing dose with cardiology and could also consider penile injections.    ---------------------------------------------------------------------------------------------------------------------    Chief Complaint:   Urethral stricture     HPI:   Mr. Chu is a 64 year old-year-old man with severe CHF with LVAD and ICD pacemaker seen in consultation from Dr. Lopez for meatal stenosis. He reports that about 5 years ago he began to notice skin changes on the end of the penis, some tearing of the skin, and narrowing at the tip of the urethra. He was self-dilating but with the LVAD could not shower as often and became concerned about dilating  and introducing bacteria. He has a buried penis, which causes spraying and difficulty aiming his stream. He denies weak stream or difficulty starting urination.     He does report difficulty achieving an erection. He tried Viagra 25 mg without benefit. He does note that he sometimes has normal erections while sleeping.     He saw dermatology this morning and was given tacrolimus ointment to use for depigmentation. Plan is to follow up in 2 months and start topical steroid if the rash persists.     REVIEW OF QUESTIONNAIRES:  Questionnaires reviewed. See flowsheet for details.    Review of Systems:   Pertinent items are noted in HPI or as below, remainder of complete ROS is negative.      Active Medications:     Current Outpatient Medications:      allopurinol (ZYLOPRIM) 100 MG tablet, Take 2 tablets (200 mg) by mouth daily, Disp: 180 tablet, Rfl: 0     amoxicillin (AMOXIL) 500 MG capsule, Take 4 capsules (2,000 mg) by mouth once as needed (Take 4 capsules (2000mg), one hour before any dental cleanings or procedures), Disp: 4 capsule, Rfl: 3     aspirin (ASA) 81 MG chewable tablet, Take 1 tablet (81 mg) by mouth daily, Disp: 120 tablet, Rfl: 0     Blood Glucose Monitoring Suppl (BLOOD GLUCOSE MONITOR SYSTEM) w/Device KIT, three times a week, Disp: , Rfl:      bumetanide (BUMEX) 1 MG tablet, Take 2 tablets (2 mg) by mouth 2 times daily, Disp: 360 tablet, Rfl: 1     colchicine (COLCYRS) 0.6 MG tablet, Take 1 tablet (0.6 mg) by mouth 2 times daily, Disp: 180 tablet, Rfl: 1     enoxaparin (LOVENOX) 100 MG/ML syringe, Inject 100 mg (1 ml) every 12 hours as directed by the Anticoagulation Clinic., Disp: 10 Syringe, Rfl: 1     losartan (COZAAR) 25 MG tablet, Take 1 tablet (25 mg) by mouth daily, Disp: 90 tablet, Rfl: 3     potassium chloride ER (K-DUR/KLOR-CON M) 10 MEQ CR tablet, Take 3 tablets (30 mEq) by mouth daily, Disp: 48 tablet, Rfl: 11     spironolactone (ALDACTONE) 25 MG tablet, Take 1.5 tablets (37.5 mg) by mouth  "daily, Disp: 145 tablet, Rfl: 3     traZODone (DESYREL) 100 MG tablet, TAKE 1 TABLET BY MOUTH EVERY DAY AT BEDTIME AS NEEDED FOR SLEEP, Disp: 30 tablet, Rfl: 1     vitamin B1 (THIAMINE) 100 MG tablet, Take 1 tablet (100 mg) by mouth daily, Disp: 30 tablet, Rfl: 0     warfarin (COUMADIN) 2 MG tablet, Take 3 mg PO daily at 6 pm until next INR check, then dose per INR goal 2-3, Disp: 150 tablet, Rfl: 11     metoprolol succinate ER (TOPROL XL) 25 MG 24 hr tablet, Take 0.5 tablets (12.5 mg) by mouth daily, Disp: 45 tablet, Rfl: 1     tacrolimus (PROTOPIC) 0.1 % external ointment, Apply topically 2 times daily (Patient not taking: Reported on 8/19/2019), Disp: 30 g, Rfl: 3  No current facility-administered medications for this visit.       Allergies:   Patient has no known allergies.      Past Medical History:  Acute systolic congestive heart failure   Diabetes mellitus   Hypertension   Hyperlipidemia   Liver disease   Left ventricular assist device present   Marijuana abuse   Mitral regurgitation   Nocturnal oxygen desaturation   SHIRLEY   Pacemaker ICD 4/7/2017   Situational mixed and and depression   Nonischemic cardiomyopathy      Past Surgical History:  Right heart cath 5/2019   Esophagogastroduodenoscopy 11/2018   Partial Median Sternotomy, Left Thoracotomy, Minimally Invasive Left Ventricular Assist Device Placement (Heartmate III), On Cardiopulmonary Bypass 12/2018     Family History:   Father: heart failure   Paternal uncle: heart failure, MI   Mother: coronary artery disease     Social History:   Former smoker, 6 pack years, quit in 2017.   Rare alcohol use.      Physical Exam:   General:  Well-dressed, well-nourished man in no acute distress.  Vitals: Ht 1.765 m (5' 9.5\")   Wt 109.5 kg (241 lb 8 oz)   BMI 35.15 kg/m    Estimated body mass index is 35.15 kg/m  as calculated from the following:  Height as of this encounter: 1.765 m (5' 9.5\").  Weight as of this encounter: 109.5 kg (241 lb 8 oz).  Eyes: anicteric, " EOMI  Lymph: No cervical, supraclavicular or axillary lymphadenopathy  Lungs:  No respiratory distress.  Heart:  Regular rate and rhythm.     Abdomen:  moderately obese, soft, nontender, without masses.  There are not surgical scars.    :  Penis is circumcised. There is some loss of pigmentation in the distal 1/3 of the ventral shaft of the penis. On the ventral quarter of the glans is also white-bran. Mild narrowing at the opening. Testes are 10 mL bilaterally without masses. Rectal examination was not done.   Musculoskeletal:  Motor strength is excellent throughout.     Neurologic:  Grossly nonfocal.    Back: Flanks are nontender.    Imaging:   Retrograde urethrogram and voiding cystourethrogram were performed earlier and the images were independently interpreted by me and are reviewed with the patient.  These demonstrate no visible stricture. The bladder contour is normal without diverticulae.    Review of Office Studies:      Urinary Flow Rate  Peak Flow: 29.5 mL/s  Average Flow: 14.5 mL/s  Voided (mL): 297 mL  Residual Volume by Ultrasound: 27 mL  Character of Curve: Bell Shape    Review of Outside Records:  I reviewed outside records for 10 minutes.  Findings include:   He had urethral dilation on 12/13/2013 with Dr. 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     Scribe Disclosure:  I, Mari Tyson, am serving as a scribe to document services personally performed by Joey García MD at this visit, based upon the provider's statements to me. All documentation has been reviewed by the aforementioned provider prior to being entered into the official medical record.       Again, thank you for allowing me to participate in the care of your patient.      Sincerely,    Joey García MD

## 2019-08-19 NOTE — NURSING NOTE
"Chief Complaint   Patient presents with     Consult     urethral stricture consult       Height 1.765 m (5' 9.5\"), weight 109.5 kg (241 lb 8 oz). Body mass index is 35.15 kg/m .    Patient Active Problem List   Diagnosis     CHF (congestive heart failure) (H)     Acute systolic congestive heart failure (H)     Nonischemic cardiomyopathy (H)     Cardiac defibrillator, [place] Navidog, Single Chamber- NOT dependent     Nocturnal oxygen desaturation     Acute on chronic systolic CHF (congestive heart failure) (H)     Heart failure with acute decompensation, type unknown (H)     Acute on chronic systolic heart failure (H)     Heart failure (H)     LVAD (left ventricular assist device) present (H)     Systolic heart failure (H)       No Known Allergies    Current Outpatient Medications   Medication Sig Dispense Refill     allopurinol (ZYLOPRIM) 100 MG tablet Take 2 tablets (200 mg) by mouth daily 180 tablet 0     amoxicillin (AMOXIL) 500 MG capsule Take 4 capsules (2,000 mg) by mouth once as needed (Take 4 capsules (2000mg), one hour before any dental cleanings or procedures) 4 capsule 3     aspirin (ASA) 81 MG chewable tablet Take 1 tablet (81 mg) by mouth daily 120 tablet 0     Blood Glucose Monitoring Suppl (BLOOD GLUCOSE MONITOR SYSTEM) w/Device KIT three times a week       bumetanide (BUMEX) 1 MG tablet Take 2 tablets (2 mg) by mouth 2 times daily 360 tablet 1     colchicine (COLCYRS) 0.6 MG tablet Take 1 tablet (0.6 mg) by mouth 2 times daily 180 tablet 1     enoxaparin (LOVENOX) 100 MG/ML syringe Inject 100 mg (1 ml) every 12 hours as directed by the Anticoagulation Clinic. 10 Syringe 1     losartan (COZAAR) 25 MG tablet Take 1 tablet (25 mg) by mouth daily 90 tablet 3     potassium chloride ER (K-DUR/KLOR-CON M) 10 MEQ CR tablet Take 3 tablets (30 mEq) by mouth daily 48 tablet 11     spironolactone (ALDACTONE) 25 MG tablet Take 1.5 tablets (37.5 mg) by mouth daily 145 tablet 3     traZODone (DESYREL) 100 MG " tablet TAKE 1 TABLET BY MOUTH EVERY DAY AT BEDTIME AS NEEDED FOR SLEEP 30 tablet 1     vitamin B1 (THIAMINE) 100 MG tablet Take 1 tablet (100 mg) by mouth daily 30 tablet 0     warfarin (COUMADIN) 2 MG tablet Take 3 mg PO daily at 6 pm until next INR check, then dose per INR goal 2-3 150 tablet 11     metoprolol succinate ER (TOPROL XL) 25 MG 24 hr tablet Take 0.5 tablets (12.5 mg) by mouth daily 45 tablet 1     tacrolimus (PROTOPIC) 0.1 % external ointment Apply topically 2 times daily (Patient not taking: Reported on 8/19/2019) 30 g 3       Social History     Tobacco Use     Smoking status: Former Smoker     Packs/day: 0.30     Years: 20.00     Pack years: 6.00     Types: Cigarettes     Smokeless tobacco: Never Used     Tobacco comment: quit 3/2017   Substance Use Topics     Alcohol use: No     Frequency: Never     Comment: rare     Drug use: No       JOSE M Apple  8/19/2019  2:03 PM

## 2019-08-19 NOTE — PROGRESS NOTES
Urology Clinic    Joey García MD  Date of Service: 2019     Name: Danielito Chu  MRN: [id number]  Age: 64 year old  : 1955  Referring provider: Minoo Lopez     Assessment and Plan:  A 64 year old-year-old gentleman with meatal stenosis, buried penis, and skin depigmentation on the penis.     - We discussed options of urethral dilation vs. Meatotomy. He prefers self-dilation as he has done that before. I reassured him that dilating just a couple of cm into urethra will not cause UTI.   - We also discussed that his weight is contributing to the buried penis. He was advised to work towards and weight loss. If he loses weight and has excess skin, this could be removed surgically. In the interim, he will need to pull the head of the penis out and push his belly back.   - He will use the tacrolimus ointment prescribed today by dermatology.   - He did not have benefit in erectile dysfunction with Viagra 25 mg. He could discuss increasing dose with cardiology and could also consider penile injections.    ---------------------------------------------------------------------------------------------------------------------    Chief Complaint:   Urethral stricture     HPI:   Mr. Chu is a 64 year old-year-old man with severe CHF with LVAD and ICD pacemaker seen in consultation from Dr. Lopez for meatal stenosis. He reports that about 5 years ago he began to notice skin changes on the end of the penis, some tearing of the skin, and narrowing at the tip of the urethra. He was self-dilating but with the LVAD could not shower as often and became concerned about dilating and introducing bacteria. He has a buried penis, which causes spraying and difficulty aiming his stream. He denies weak stream or difficulty starting urination.     He does report difficulty achieving an erection. He tried Viagra 25 mg without benefit. He does note that he sometimes has normal erections while sleeping.      He saw dermatology this morning and was given tacrolimus ointment to use for depigmentation. Plan is to follow up in 2 months and start topical steroid if the rash persists.     REVIEW OF QUESTIONNAIRES:  Questionnaires reviewed. See flowsheet for details.    Review of Systems:   Pertinent items are noted in HPI or as below, remainder of complete ROS is negative.      Active Medications:     Current Outpatient Medications:      allopurinol (ZYLOPRIM) 100 MG tablet, Take 2 tablets (200 mg) by mouth daily, Disp: 180 tablet, Rfl: 0     amoxicillin (AMOXIL) 500 MG capsule, Take 4 capsules (2,000 mg) by mouth once as needed (Take 4 capsules (2000mg), one hour before any dental cleanings or procedures), Disp: 4 capsule, Rfl: 3     aspirin (ASA) 81 MG chewable tablet, Take 1 tablet (81 mg) by mouth daily, Disp: 120 tablet, Rfl: 0     Blood Glucose Monitoring Suppl (BLOOD GLUCOSE MONITOR SYSTEM) w/Device KIT, three times a week, Disp: , Rfl:      bumetanide (BUMEX) 1 MG tablet, Take 2 tablets (2 mg) by mouth 2 times daily, Disp: 360 tablet, Rfl: 1     colchicine (COLCYRS) 0.6 MG tablet, Take 1 tablet (0.6 mg) by mouth 2 times daily, Disp: 180 tablet, Rfl: 1     enoxaparin (LOVENOX) 100 MG/ML syringe, Inject 100 mg (1 ml) every 12 hours as directed by the Anticoagulation Clinic., Disp: 10 Syringe, Rfl: 1     losartan (COZAAR) 25 MG tablet, Take 1 tablet (25 mg) by mouth daily, Disp: 90 tablet, Rfl: 3     potassium chloride ER (K-DUR/KLOR-CON M) 10 MEQ CR tablet, Take 3 tablets (30 mEq) by mouth daily, Disp: 48 tablet, Rfl: 11     spironolactone (ALDACTONE) 25 MG tablet, Take 1.5 tablets (37.5 mg) by mouth daily, Disp: 145 tablet, Rfl: 3     traZODone (DESYREL) 100 MG tablet, TAKE 1 TABLET BY MOUTH EVERY DAY AT BEDTIME AS NEEDED FOR SLEEP, Disp: 30 tablet, Rfl: 1     vitamin B1 (THIAMINE) 100 MG tablet, Take 1 tablet (100 mg) by mouth daily, Disp: 30 tablet, Rfl: 0     warfarin (COUMADIN) 2 MG tablet, Take 3 mg PO daily  "at 6 pm until next INR check, then dose per INR goal 2-3, Disp: 150 tablet, Rfl: 11     metoprolol succinate ER (TOPROL XL) 25 MG 24 hr tablet, Take 0.5 tablets (12.5 mg) by mouth daily, Disp: 45 tablet, Rfl: 1     tacrolimus (PROTOPIC) 0.1 % external ointment, Apply topically 2 times daily (Patient not taking: Reported on 8/19/2019), Disp: 30 g, Rfl: 3  No current facility-administered medications for this visit.       Allergies:   Patient has no known allergies.      Past Medical History:  Acute systolic congestive heart failure   Diabetes mellitus   Hypertension   Hyperlipidemia   Liver disease   Left ventricular assist device present   Marijuana abuse   Mitral regurgitation   Nocturnal oxygen desaturation   SHIRLEY   Pacemaker ICD 4/7/2017   Situational mixed and and depression   Nonischemic cardiomyopathy      Past Surgical History:  Right heart cath 5/2019   Esophagogastroduodenoscopy 11/2018   Partial Median Sternotomy, Left Thoracotomy, Minimally Invasive Left Ventricular Assist Device Placement (Heartmate III), On Cardiopulmonary Bypass 12/2018     Family History:   Father: heart failure   Paternal uncle: heart failure, MI   Mother: coronary artery disease     Social History:   Former smoker, 6 pack years, quit in 2017.   Rare alcohol use.      Physical Exam:   General:  Well-dressed, well-nourished man in no acute distress.  Vitals: Ht 1.765 m (5' 9.5\")   Wt 109.5 kg (241 lb 8 oz)   BMI 35.15 kg/m   Estimated body mass index is 35.15 kg/m  as calculated from the following:    Height as of this encounter: 1.765 m (5' 9.5\").    Weight as of this encounter: 109.5 kg (241 lb 8 oz).  Eyes: anicteric, EOMI  Lymph: No cervical, supraclavicular or axillary lymphadenopathy  Lungs:  No respiratory distress.  Heart:  Regular rate and rhythm.     Abdomen:  moderately obese, soft, nontender, without masses.  There are not surgical scars.    :  Penis is circumcised. There is some loss of pigmentation in the distal 1/3 " of the ventral shaft of the penis. On the ventral quarter of the glans is also white-bran. Mild narrowing at the opening. Testes are 10 mL bilaterally without masses. Rectal examination was not done.   Musculoskeletal:  Motor strength is excellent throughout.     Neurologic:  Grossly nonfocal.    Back: Flanks are nontender.    Imaging:   Retrograde urethrogram and voiding cystourethrogram were performed earlier and the images were independently interpreted by me and are reviewed with the patient.  These demonstrate no visible stricture. The bladder contour is normal without diverticulae.    Review of Office Studies:      Urinary Flow Rate  Peak Flow: 29.5 mL/s  Average Flow: 14.5 mL/s  Voided (mL): 297 mL  Residual Volume by Ultrasound: 27 mL  Character of Curve: Bell Shape    Review of Outside Records:  I reviewed outside records for 10 minutes.  Findings include:   He had urethral dilation on 12/13/2013 with Dr. 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     Scribe Disclosure:  I, Mari Tyson, am serving as a scribe to document services personally performed by Joey García MD at this visit, based upon the provider's statements to me. All documentation has been reviewed by the aforementioned provider prior to being entered into the official medical record.

## 2019-08-19 NOTE — NURSING NOTE
Dermatology Rooming Note    Danielito Chu's goals for this visit include:   Chief Complaint   Patient presents with     Skin Check     Skin check, no concerns today.      Mackenzie Ortiz LPN

## 2019-08-19 NOTE — PROGRESS NOTES
ANTICOAGULATION FOLLOW-UP CLINIC VISIT    Patient Name:  Danielito Chu  Date:  2019  Contact Type:  Telephone    SUBJECTIVE:  Patient Findings     Comments:   This INR was done via venous draw and pt usually gets fingerstick's. Pt would like to continue with maintenance dose and will recheck an INR in a week. If within goal range requests to go out two weeks. Pt is going to reschedule  appointment with the LVAD team         Clinical Outcomes     Comments:   This INR was done via venous draw and pt usually gets fingerstick's. Pt would like to continue with maintenance dose and will recheck an INR in a week. If within goal range requests to go out two weeks. Pt is going to reschedule  appointment with the LVAD team            OBJECTIVE    INR   Date Value Ref Range Status   2019 2.05 (H) 0.86 - 1.14 Final     Factor 2 Assay   Date Value Ref Range Status   10/27/2018 70 60 - 140 % Final     Comment:     The Factor 2 activity level is not a screening test for the Prothrombin 64994   mutation.         ASSESSMENT / PLAN  INR assessment THER    Recheck INR In: 1 WEEK    INR Location Clinic      Anticoagulation Summary  As of 2019    INR goal:   2.0-3.0   TTR:   85.1 % (8 mo)   INR used for dosin.05 (2019)   Warfarin maintenance plan:   4 mg (2 mg x 2) every Tue; 3 mg (2 mg x 1.5) all other days   Full warfarin instructions:   4 mg every Tue; 3 mg all other days   Weekly warfarin total:   22 mg   Plan last modified:   Giovanni Ordonez, [facility] (5/15/2019)   Next INR check:   2019   Priority:   INR   Target end date:   Indefinite    Indications    LVAD (left ventricular assist device) present (H) [Z95.811]             Anticoagulation Episode Summary     INR check location:       Preferred lab:       Send INR reminders to:   [facility]    Comments:   [place] [facility]  [phone]/Fax [phone]  LVAD plced 18 ASA 81mg Daily Jantoven 2mg tabs  Lab [id number]  Call pt at [phone] Emphasize next INR date with pt++Send email each time++  Pt is home .      Anticoagulation Care Providers     Provider Role Specialty Phone number    Lorena Watkins MD Responsible Cardiology [phone]            See the Encounter Report to view Anticoagulation Flowsheet and Dosing Calendar (Go to Encounters tab in chart review, and find the Anticoagulation Therapy Visit)    Spoke with patient. Gave them their lab results and new warfarin recommendation.  No changes in health, medication, or diet. No missed doses, no falls. No signs or symptoms of bleed or clotting.     Kristie Fernandez RN

## 2019-08-19 NOTE — PROGRESS NOTES
"[facility] Dermatology Note      Dermatology Problem List:  1. Depigmentation and fissuring on the penis- irritant dermatitis with depigmentation vs vitiligo vs less likely lichen sclerosus   -tacrolimus 0.1% ointment BID   -low threshold for biopsy if recalcitrant    Encounter Date: Aug 19, 2019    CC:  Chief Complaint   Patient presents with     Skin Check     Skin check, no concerns today.          History of Present Illness:  Mr. Danielito Chu is a 64 year old male who presents for a skin check. Of note, the patient is currently on the heart transplant waitlist. The patient reports that five years ago, he noticed a fissure on the ventral side of his penis after intercourse. There was no itching or pain associated with the lesion. He states that around the same time, he developed a urethral restriction that inhibited his ability to void urine which has became severe to the point that his urologist instructed him to catheterize himself regularly. He notes that he has also developed a \"wrinkle\" at the tip of his penis as well as asymptomatic depigmentation. When he develops an erection, he states that he experiences a pressure sensation around the area of the fissure. He was instructed by his PCP to present to a dermatologist for evaluation of the discoloration. He notes that his penis has been getting smaller recently and going back into his body to the point that he is unable to hold his penis when he urinates. The patient voices no other concerns.      Past Medical History:   Patient Active Problem List   Diagnosis     CHF (congestive heart failure) (H)     Acute systolic congestive heart failure (H)     Nonischemic cardiomyopathy (H)     Cardiac defibrillator, [employer], Single Chamber- NOT dependent     Nocturnal oxygen desaturation     Acute on chronic systolic CHF (congestive heart failure) (H)     Heart failure with acute decompensation, type unknown (H)     Acute on " chronic systolic heart failure (H)     Heart failure (H)     LVAD (left ventricular assist device) present (H)     Systolic heart failure (H)     Past Medical History:   Diagnosis Date     Acute systolic congestive heart failure (H) 2017     Diabetes (H)      HTN (hypertension)      Hyperlipidemia      Liver disease      LVAD (left ventricular assist device) present (H)     HM 3      Marijuana abuse      Mitral regurgitation      Nocturnal oxygen desaturation 3/29/2018     Nonischemic cardiomyopathy (H) 2017     SHIRLEY (obstructive sleep apnea)      Pacemaker 2017    ICD 17     Situational mixed anxiety and depressive disorder      Past Surgical History:   Procedure Laterality Date     CV RIGHT HEART CATH N/A 2019    Procedure: CV RIGHT HEART CATH;  Surgeon: Jules Allan MD;  Location:  HEART CARDIAC CATH LAB     ESOPHAGOSCOPY, GASTROSCOPY, DUODENOSCOPY (EGD), COMBINED N/A 2018    Procedure: COMBINED ESOPHAGOSCOPY, GASTROSCOPY, DUODENOSCOPY (EGD);  Surgeon: Candido Mendez MD;  Location:  GI     IMPLANT IMPLANTABLE CARDIOVERTER DEFIBRILLATOR       INSERT VENTRICULAR ASSIST DEVICE LEFT (HEARTMATE II/III) MINIMALLY INVASIVE N/A 2018    Procedure: Partial Median Sternotomy, Left Thoracotomy, Minimally Invasive Left Ventricular Assist Device Placement (Heartmate III), On Cardiopulmonary Bypass;  Surgeon: Andrei Silva MD;  Location:  OR       Social History:  Patient reports that he has quit smoking. His smoking use included cigarettes. He has a 6.00 pack-year smoking history. He has never used smokeless tobacco. He reports that he does not drink alcohol or use drugs.    Family History:  Family History   Problem Relation Age of Onset     Heart Failure Father          at age 86     Heart Failure Paternal Uncle          at 66      Myocardial Infarction Paternal Uncle          at age 62     Coronary Artery Disease Mother          during CABG  at age 41       Medications:  Current Outpatient Medications   Medication Sig Dispense Refill     tacrolimus (PROTOPIC) 0.1 % external ointment Apply topically 2 times daily (Patient not taking: Reported on 8/19/2019) 30 g 3     allopurinol (ZYLOPRIM) 100 MG tablet Take 2 tablets (200 mg) by mouth daily 180 tablet 0     amoxicillin (AMOXIL) 500 MG capsule Take 4 capsules (2,000 mg) by mouth once as needed (Take 4 capsules (2000mg), one hour before any dental cleanings or procedures) 4 capsule 3     aspirin (ASA) 81 MG chewable tablet Take 1 tablet (81 mg) by mouth daily 120 tablet 0     Blood Glucose Monitoring Suppl (BLOOD GLUCOSE MONITOR SYSTEM) w/Device KIT three times a week       bumetanide (BUMEX) 1 MG tablet Take 2 tablets (2 mg) by mouth 2 times daily 360 tablet 1     colchicine (COLCYRS) 0.6 MG tablet Take 1 tablet (0.6 mg) by mouth 2 times daily 180 tablet 1     enoxaparin (LOVENOX) 100 MG/ML syringe Inject 100 mg (1 ml) every 12 hours as directed by the Anticoagulation Clinic. 10 Syringe 1     losartan (COZAAR) 25 MG tablet Take 1 tablet (25 mg) by mouth daily 90 tablet 3     metoprolol succinate ER (TOPROL XL) 25 MG 24 hr tablet Take 0.5 tablets (12.5 mg) by mouth daily 45 tablet 1     potassium chloride ER (K-DUR/KLOR-CON M) 10 MEQ CR tablet Take 3 tablets (30 mEq) by mouth daily 48 tablet 11     spironolactone (ALDACTONE) 25 MG tablet Take 1.5 tablets (37.5 mg) by mouth daily 145 tablet 3     traZODone (DESYREL) 100 MG tablet TAKE 1 TABLET BY MOUTH EVERY DAY AT BEDTIME AS NEEDED FOR SLEEP 30 tablet 1     vitamin B1 (THIAMINE) 100 MG tablet Take 1 tablet (100 mg) by mouth daily 30 tablet 0     warfarin (COUMADIN) 2 MG tablet Take 3 mg PO daily at 6 pm until next INR check, then dose per INR goal 2-3 150 tablet 11       No Known Allergies    Review of Systems:  -Constitutional: Otherwise feeling well today, in usual state of health.  -HEENT: Patient denies nonhealing oral sores.  -Skin: As above in HPI.  No additional skin concerns.    Physical exam:  Vitals: There were no vitals taken for this visit.  GEN: This is a well developed, well-nourished male in no acute distress, in a pleasant mood.    SKIN: Focused examination of the genital area was performed  -Sexton skin type: IV  -depigmented patch on the inferior glans and the distal shaft of the penis  -there is a fissure on the inferior coronal sulcus  -no atrophy  -No other lesions of concern on areas examined.       Impression/Plan:  1. Depigmentation and fissure on the penis. The differential diagnosis includes irritant dermatitis with depigmentation vs vitiligo vs less likely lichen sclerosus given lack of epidermal change. Per patient report, there are no urethral strictures, but defer to urology on management for these as indicated.    Discussed the potential etiologies as well as the risks and benefits of treatment with a topical anti-inflammatory    Recommended the patient continue following up with urology for his additional symptoms including difficulty voiding and erectile discomfort    Start: tacrolimus 0.1% ointment BID    CC Lorena Rosalia Watkins MD  [address] [facility] [place] [address]  [place] [phone] on close of this encounter.  Follow-up in 2-3 months, earlier for new or changing lesions.       Staff Involved:  Scribe/Staff    Scribe Disclosure  I, Dominic Najjar, am serving as a scribe to document services personally performed by Dr. Moisés Krause MD, based on data collection and the provider's statements to me.    Provider Disclosure:   The documentation recorded by the scribe accurately reflects the services I personally performed and the decisions made by me.    Moisés Krause MD   of Dermatology  Department of Dermatology  [facility] [employer]  Medicine

## 2019-08-19 NOTE — LETTER
"8/19/2019       RE: Danielito Chu  [address] [place] [address]     Dear Colleague,    Thank you for referring your patient, Danielito Chu, to the [facility] DERMATOLOGY at [facility]. Please see a copy of my visit note below.    [facility] Dermatology Note      Dermatology Problem List:  1. Depigmentation and fissuring on the penis- irritant dermatitis with depigmentation vs vitiligo vs less likely lichen sclerosus   -tacrolimus 0.1% ointment BID   -low threshold for biopsy if recalcitrant    Encounter Date: Aug 19, 2019    CC:  Chief Complaint   Patient presents with     Skin Check     Skin check, no concerns today.          History of Present Illness:  Mr. Danielito Chu is a 64 year old male who presents for a skin check. Of note, the patient is currently on the heart transplant waitlist. The patient reports that five years ago, he noticed a fissure on the ventral side of his penis after intercourse. There was no itching or pain associated with the lesion. He states that around the same time, he developed a urethral restriction that inhibited his ability to void urine which has became severe to the point that his urologist instructed him to catheterize himself regularly. He notes that he has also developed a \"wrinkle\" at the tip of his penis as well as asymptomatic depigmentation. When he develops an erection, he states that he experiences a pressure sensation around the area of the fissure. He was instructed by his PCP to present to a dermatologist for evaluation of the discoloration. He notes that his penis has been getting smaller recently and going back into his body to the point that he is unable to hold his penis when he urinates. The patient voices no other concerns.      Past Medical History:   Patient Active Problem List   Diagnosis     CHF (congestive heart failure) (H)     Acute systolic congestive heart failure (H) "     Nonischemic cardiomyopathy (H)     Cardiac defibrillator, [employer], Single Chamber- NOT dependent     Nocturnal oxygen desaturation     Acute on chronic systolic CHF (congestive heart failure) (H)     Heart failure with acute decompensation, type unknown (H)     Acute on chronic systolic heart failure (H)     Heart failure (H)     LVAD (left ventricular assist device) present (H)     Systolic heart failure (H)     Past Medical History:   Diagnosis Date     Acute systolic congestive heart failure (H) 4/5/2017     Diabetes (H)      HTN (hypertension)      Hyperlipidemia      Liver disease      LVAD (left ventricular assist device) present (H)     HM 3 12/18     Marijuana abuse      Mitral regurgitation      Nocturnal oxygen desaturation 3/29/2018     Nonischemic cardiomyopathy (H) 4/5/2017     SHIRLEY (obstructive sleep apnea)      Pacemaker 04/07/2017    ICD 4/7/17     Situational mixed anxiety and depressive disorder      Past Surgical History:   Procedure Laterality Date     CV RIGHT HEART CATH N/A 5/9/2019    Procedure: CV RIGHT HEART CATH;  Surgeon: Jules Allan MD;  Location:  HEART CARDIAC CATH LAB     ESOPHAGOSCOPY, GASTROSCOPY, DUODENOSCOPY (EGD), COMBINED N/A 11/21/2018    Procedure: COMBINED ESOPHAGOSCOPY, GASTROSCOPY, DUODENOSCOPY (EGD);  Surgeon: Candido Mendez MD;  Location:  GI     IMPLANT IMPLANTABLE CARDIOVERTER DEFIBRILLATOR       INSERT VENTRICULAR ASSIST DEVICE LEFT (HEARTMATE II/III) MINIMALLY INVASIVE N/A 12/5/2018    Procedure: Partial Median Sternotomy, Left Thoracotomy, Minimally Invasive Left Ventricular Assist Device Placement (Heartmate III), On Cardiopulmonary Bypass;  Surgeon: Andrei Silva MD;  Location:  OR       Social History:  Patient reports that he has quit smoking. His smoking use included cigarettes. He has a 6.00 pack-year smoking history. He has never used smokeless tobacco. He reports that he does not drink alcohol or use  drugs.    Family History:  Family History   Problem Relation Age of Onset     Heart Failure Father          at age 86     Heart Failure Paternal Uncle          at 66      Myocardial Infarction Paternal Uncle          at age 62     Coronary Artery Disease Mother          during CABG at age 41       Medications:  Current Outpatient Medications   Medication Sig Dispense Refill     tacrolimus (PROTOPIC) 0.1 % external ointment Apply topically 2 times daily (Patient not taking: Reported on 2019) 30 g 3     allopurinol (ZYLOPRIM) 100 MG tablet Take 2 tablets (200 mg) by mouth daily 180 tablet 0     amoxicillin (AMOXIL) 500 MG capsule Take 4 capsules (2,000 mg) by mouth once as needed (Take 4 capsules (2000mg), one hour before any dental cleanings or procedures) 4 capsule 3     aspirin (ASA) 81 MG chewable tablet Take 1 tablet (81 mg) by mouth daily 120 tablet 0     Blood Glucose Monitoring Suppl (BLOOD GLUCOSE MONITOR SYSTEM) w/Device KIT three times a week       bumetanide (BUMEX) 1 MG tablet Take 2 tablets (2 mg) by mouth 2 times daily 360 tablet 1     colchicine (COLCYRS) 0.6 MG tablet Take 1 tablet (0.6 mg) by mouth 2 times daily 180 tablet 1     enoxaparin (LOVENOX) 100 MG/ML syringe Inject 100 mg (1 ml) every 12 hours as directed by the Anticoagulation Clinic. 10 Syringe 1     losartan (COZAAR) 25 MG tablet Take 1 tablet (25 mg) by mouth daily 90 tablet 3     metoprolol succinate ER (TOPROL XL) 25 MG 24 hr tablet Take 0.5 tablets (12.5 mg) by mouth daily 45 tablet 1     potassium chloride ER (K-DUR/KLOR-CON M) 10 MEQ CR tablet Take 3 tablets (30 mEq) by mouth daily 48 tablet 11     spironolactone (ALDACTONE) 25 MG tablet Take 1.5 tablets (37.5 mg) by mouth daily 145 tablet 3     traZODone (DESYREL) 100 MG tablet TAKE 1 TABLET BY MOUTH EVERY DAY AT BEDTIME AS NEEDED FOR SLEEP 30 tablet 1     vitamin B1 (THIAMINE) 100 MG tablet Take 1 tablet (100 mg) by mouth daily 30 tablet 0     warfarin  (COUMADIN) 2 MG tablet Take 3 mg PO daily at 6 pm until next INR check, then dose per INR goal 2-3 150 tablet 11       No Known Allergies    Review of Systems:  -Constitutional: Otherwise feeling well today, in usual state of health.  -HEENT: Patient denies nonhealing oral sores.  -Skin: As above in HPI. No additional skin concerns.    Physical exam:  Vitals: There were no vitals taken for this visit.  GEN: This is a well developed, well-nourished male in no acute distress, in a pleasant mood.    SKIN: Focused examination of the genital area was performed  -Sexton skin type: IV  -depigmented patch on the inferior glans and the distal shaft of the penis  -there is a fissure on the inferior coronal sulcus  -no atrophy  -No other lesions of concern on areas examined.       Impression/Plan:  1. Depigmentation and fissure on the penis. The differential diagnosis includes irritant dermatitis with depigmentation vs vitiligo vs less likely lichen sclerosus given lack of epidermal change. Per patient report, there are no urethral strictures, but defer to urology on management for these as indicated.    Discussed the potential etiologies as well as the risks and benefits of treatment with a topical anti-inflammatory    Recommended the patient continue following up with urology for his additional symptoms including difficulty voiding and erectile discomfort    Start: tacrolimus 0.1% ointment BID    CC Lorena Watkins MD  [address] [facility] [address]  [place] [address] on close of this encounter.  Follow-up in 2-3 months, earlier for new or changing lesions.       Staff Involved:  Scribe/Staff    Scribe Disclosure  I, Dominic Najjar, am serving as a scribe to document services personally performed by Dr. Moisés Krause MD, based on data collection and the provider's statements to me.    Provider Disclosure:   The documentation recorded by the scribe accurately reflects the services I personally performed and the  decisions made by me.    Moisés Krause MD   of Dermatology  Department of Dermatology  [facility]

## 2019-08-20 LAB
SA1 CELL: NORMAL
SA1 COMMENTS: NORMAL
SA1 HI RISK ABY: NORMAL
SA1 MOD RISK ABY: NORMAL
SA1 TEST METHOD: NORMAL
SA2 CELL: NORMAL
SA2 COMMENTS: NORMAL
SA2 HI RISK ABY UA: NORMAL
SA2 MOD RISK ABY: NORMAL
SA2 TEST METHOD: NORMAL
UNACCEPTABLE ANTIGEN: NORMAL
UNOS CPRA: 0

## 2019-08-22 ENCOUNTER — CARE COORDINATION (OUTPATIENT)
Dept: CARDIOLOGY | Facility: CLINIC | Age: 64
End: 2019-08-22

## 2019-08-22 DIAGNOSIS — I50.22 CHRONIC SYSTOLIC CONGESTIVE HEART FAILURE (H): Primary | ICD-10-CM

## 2019-08-27 NOTE — PROGRESS NOTES
8/30/19 ADDENDUM  Patient did not have their labs done today. I left a  message for the  patient to get their labs done as soon as possible and call the Anticoagulation Clinic.  TE      Addendum 8/27/19    Spoke with Fabiano,. He will get an  INR on Fri 8/30/19    Chandra Ordonez [facility]

## 2019-08-28 ENCOUNTER — DOCUMENTATION ONLY (OUTPATIENT)
Dept: TRANSPLANT | Facility: CLINIC | Age: 64
End: 2019-08-28

## 2019-08-28 NOTE — PROGRESS NOTES
Status 4 Heart Extension Complete 08/28/19    This patient meets status 4 criteria as evidenced by:     Dischargeable LVAD without discretionary 30 days     Patient's primary cardiologist and/or attending physician is in agreement with this plan.      Status 4 Extension due 11/27/19

## 2019-09-05 ENCOUNTER — TELEPHONE (OUTPATIENT)
Dept: TRANSPLANT | Facility: CLINIC | Age: 64
End: 2019-09-05

## 2019-09-05 ENCOUNTER — CARE COORDINATION (OUTPATIENT)
Dept: CARDIOLOGY | Facility: CLINIC | Age: 64
End: 2019-09-05

## 2019-09-05 DIAGNOSIS — I50.22 CHRONIC SYSTOLIC CONGESTIVE HEART FAILURE (H): Primary | ICD-10-CM

## 2019-09-05 NOTE — TELEPHONE ENCOUNTER
Transplant Update 09/05/19:  Topic:  Travel    Spoke to patient and verified dates of travel from 9-10-19 to 10-1-19.  Will inactivate temporarily during that time from the heart waitlist.  Made a plan with patient to call coordinator to verify that he is back in town.  Patient verbalized understanding of the plan and agrees to call Transplant Coordinator with any further questions or concerns.

## 2019-09-05 NOTE — TELEPHONE ENCOUNTER
Pt will be out of town from 9/10/19 to 10/1/19    Please connect with the pt when available regarding the pts status and to confirm the message was recieved

## 2019-09-05 NOTE — PROGRESS NOTES
Fabiano called to say he is going out of town again to [place] from 9/11 to about 9/29. He said he has his TSA letter still and he knows where to go for an INR check or any hospital needs in [place]. He said he will alert his transplant coordinator.

## 2019-09-10 ENCOUNTER — ANCILLARY PROCEDURE (OUTPATIENT)
Dept: CARDIOLOGY | Facility: CLINIC | Age: 64
End: 2019-09-10
Attending: INTERNAL MEDICINE
Payer: COMMERCIAL

## 2019-09-10 ENCOUNTER — DOCUMENTATION ONLY (OUTPATIENT)
Dept: TRANSPLANT | Facility: CLINIC | Age: 64
End: 2019-09-10

## 2019-09-10 ENCOUNTER — ANTICOAGULATION THERAPY VISIT (OUTPATIENT)
Dept: ANTICOAGULATION | Facility: CLINIC | Age: 64
End: 2019-09-10

## 2019-09-10 VITALS
OXYGEN SATURATION: 97 % | BODY MASS INDEX: 34.65 KG/M2 | HEIGHT: 70 IN | SYSTOLIC BLOOD PRESSURE: 78 MMHG | WEIGHT: 242 LBS | HEART RATE: 52 BPM

## 2019-09-10 DIAGNOSIS — I47.29 NSVT (NONSUSTAINED VENTRICULAR TACHYCARDIA) (H): ICD-10-CM

## 2019-09-10 DIAGNOSIS — I50.22 CHRONIC SYSTOLIC CONGESTIVE HEART FAILURE (H): ICD-10-CM

## 2019-09-10 DIAGNOSIS — I50.22 CHRONIC SYSTOLIC CONGESTIVE HEART FAILURE (H): Primary | ICD-10-CM

## 2019-09-10 DIAGNOSIS — Z95.811 LVAD (LEFT VENTRICULAR ASSIST DEVICE) PRESENT (H): ICD-10-CM

## 2019-09-10 DIAGNOSIS — I50.23 ACUTE ON CHRONIC SYSTOLIC HEART FAILURE (H): ICD-10-CM

## 2019-09-10 DIAGNOSIS — I10 ESSENTIAL HYPERTENSION: ICD-10-CM

## 2019-09-10 DIAGNOSIS — Z76.82 AWAITING ORGAN TRANSPLANT: Primary | ICD-10-CM

## 2019-09-10 LAB
ALBUMIN SERPL-MCNC: 4 G/DL (ref 3.4–5)
ALP SERPL-CCNC: 102 U/L (ref 40–150)
ALT SERPL W P-5'-P-CCNC: 26 U/L (ref 0–70)
ANION GAP SERPL CALCULATED.3IONS-SCNC: 6 MMOL/L (ref 3–14)
AST SERPL W P-5'-P-CCNC: 25 U/L (ref 0–45)
BILIRUB SERPL-MCNC: 0.8 MG/DL (ref 0.2–1.3)
BUN SERPL-MCNC: 20 MG/DL (ref 7–30)
CALCIUM SERPL-MCNC: 9.2 MG/DL (ref 8.5–10.1)
CHLORIDE SERPL-SCNC: 96 MMOL/L (ref 94–109)
CO2 SERPL-SCNC: 29 MMOL/L (ref 20–32)
CREAT SERPL-MCNC: 1.16 MG/DL (ref 0.66–1.25)
D DIMER PPP FEU-MCNC: 1.2 UG/ML FEU (ref 0–0.5)
ERYTHROCYTE [DISTWIDTH] IN BLOOD BY AUTOMATED COUNT: 14.6 % (ref 10–15)
GFR SERPL CREATININE-BSD FRML MDRD: 66 ML/MIN/{1.73_M2}
GLUCOSE SERPL-MCNC: 175 MG/DL (ref 70–99)
HCT VFR BLD AUTO: 49.3 % (ref 40–53)
HGB BLD-MCNC: 16.7 G/DL (ref 13.3–17.7)
INR PPP: 2.29 (ref 0.86–1.14)
LDH SERPL L TO P-CCNC: 208 U/L (ref 85–227)
MCH RBC QN AUTO: 32.1 PG (ref 26.5–33)
MCHC RBC AUTO-ENTMCNC: 33.9 G/DL (ref 31.5–36.5)
MCV RBC AUTO: 95 FL (ref 78–100)
PLATELET # BLD AUTO: 283 10E9/L (ref 150–450)
POTASSIUM SERPL-SCNC: 4 MMOL/L (ref 3.4–5.3)
PROT SERPL-MCNC: 8.5 G/DL (ref 6.8–8.8)
RBC # BLD AUTO: 5.21 10E12/L (ref 4.4–5.9)
SODIUM SERPL-SCNC: 131 MMOL/L (ref 133–144)
WBC # BLD AUTO: 8.8 10E9/L (ref 4–11)

## 2019-09-10 PROCEDURE — G0463 HOSPITAL OUTPT CLINIC VISIT: HCPCS | Mod: 25,ZF

## 2019-09-10 PROCEDURE — 80053 COMPREHEN METABOLIC PANEL: CPT | Performed by: INTERNAL MEDICINE

## 2019-09-10 PROCEDURE — 80307 DRUG TEST PRSMV CHEM ANLYZR: CPT | Performed by: INTERNAL MEDICINE

## 2019-09-10 PROCEDURE — 83615 LACTATE (LD) (LDH) ENZYME: CPT | Performed by: INTERNAL MEDICINE

## 2019-09-10 PROCEDURE — 85027 COMPLETE CBC AUTOMATED: CPT | Performed by: INTERNAL MEDICINE

## 2019-09-10 PROCEDURE — 85610 PROTHROMBIN TIME: CPT | Performed by: INTERNAL MEDICINE

## 2019-09-10 PROCEDURE — 85379 FIBRIN DEGRADATION QUANT: CPT | Performed by: INTERNAL MEDICINE

## 2019-09-10 PROCEDURE — 93750 INTERROGATION VAD IN PERSON: CPT | Mod: ZF | Performed by: NURSE PRACTITIONER

## 2019-09-10 PROCEDURE — 99214 OFFICE O/P EST MOD 30 MIN: CPT | Mod: ZP | Performed by: NURSE PRACTITIONER

## 2019-09-10 PROCEDURE — 36415 COLL VENOUS BLD VENIPUNCTURE: CPT | Performed by: INTERNAL MEDICINE

## 2019-09-10 RX ORDER — METOPROLOL SUCCINATE 25 MG/1
25 TABLET, EXTENDED RELEASE ORAL DAILY
Qty: 90 TABLET | Refills: 3 | Status: SHIPPED | OUTPATIENT
Start: 2019-09-10 | End: 2020-01-01

## 2019-09-10 RX ORDER — POTASSIUM CHLORIDE 750 MG/1
20 TABLET, EXTENDED RELEASE ORAL 2 TIMES DAILY
Qty: 120 TABLET | Refills: 11 | Status: SHIPPED | OUTPATIENT
Start: 2019-09-10 | End: 2020-01-01

## 2019-09-10 RX ORDER — BUMETANIDE 1 MG/1
TABLET ORAL
Qty: 150 TABLET | Refills: 11 | Status: SHIPPED | OUTPATIENT
Start: 2019-09-10 | End: 2019-11-04

## 2019-09-10 ASSESSMENT — MIFFLIN-ST. JEOR: SCORE: 1898.95

## 2019-09-10 ASSESSMENT — PAIN SCALES - GENERAL: PAINLEVEL: NO PAIN (0)

## 2019-09-10 NOTE — LETTER
9/10/2019      RE: Danielito Chu  [address] [place] [address]       Dear Colleague,    Thank you for the opportunity to participate in the care of your patient, Danielito Chu, at the [facility] at [facility]. Please see a copy of my visit note below.    HPI: Danielito Chu is a 63-year-old gentleman with a past medical history of DM 2, HTN, hyperlipidemia, SHIRLEY, mitral regurgitation, s/p ICD placement 4/17, and nonischemic cardiomyopathy with an EF of 15% s/p HeartMate 3 LVAD implant on 12/5/18 as BTT (status 4, but inactivated as he is leaving for [place] from 9/11-10/1/19), complicated by postoperative Flolan and pressor support. He returns for routine follow up.    Since his last visit, Fabiano has been experiencing more palpitations.  He feels pounding in his chest that is regular.  Denies PND, orthopnea, lower extremity edema.  He is eating too much.  He has gained 48 pounds since January.  He hasn't been exercising.  He admits that he has been drinking more than 2 L of fluid daily. His abdomen is more distended, but isn't firm.       He has no LVAD concerns.  His LVAD parameters are stable.  Denies HA, neurological changes, hematuria, hematochezia, melena, epistaxis, fever, chills or any concerns for driveline infection.    He is still trying to adjust to managing his LVAD on his own.  He worries about something happening to him as he lives alone.  His anxiety and depression are controlled per his report, but still something he deals with on a daily basis.       PAST MEDICAL HISTORY:  Past Medical History:   Diagnosis Date     Acute systolic congestive heart failure (H) 4/5/2017     Diabetes (H)      HTN (hypertension)      Hyperlipidemia      Liver disease      LVAD (left ventricular assist device) present (H)      3 12/18     Marijuana abuse      Mitral regurgitation      Nocturnal oxygen desaturation 3/29/2018     Nonischemic cardiomyopathy  (H) 2017     SHIRLEY (obstructive sleep apnea)      Pacemaker 2017    ICD 17     Situational mixed anxiety and depressive disorder        FAMILY HISTORY:  Family History   Problem Relation Age of Onset     Heart Failure Father          at age 86     Heart Failure Paternal Uncle          at 66      Myocardial Infarction Paternal Uncle          at age 62     Coronary Artery Disease Mother          during CABG at age 41       SOCIAL HISTORY:  Social History     Socioeconomic History     Marital status: Single     Spouse name: Not on file     Number of children: Not on file     Years of education: Not on file     Highest education level: Not on file   Social Needs     Financial resource strain: Not on file     Food insecurity - worry: Not on file     Food insecurity - inability: Not on file     Transportation needs - medical: Not on file     Transportation needs - non-medical: Not on file   Occupational History     Not on file   Tobacco Use     Smoking status: Former Smoker     Smokeless tobacco: Never Used     Tobacco comment: quit 3/2017   Substance and Sexual Activity     Alcohol use: No     Frequency: Never     Comment: social     Drug use: No     Sexual activity: Not on file   Other Topics Concern     Parent/sibling w/ CABG, MI or angioplasty before 65F 55M? Not Asked   Social History Narrative     Not on file       CURRENT MEDICATIONS:  Current Outpatient Medications   Medication Sig Dispense Refill     allopurinol (ZYLOPRIM) 100 MG tablet Take 2 tablets (200 mg) by mouth daily 180 tablet 0     amoxicillin (AMOXIL) 500 MG capsule Take 4 capsules (2,000 mg) by mouth once as needed (Take 4 capsules (2000mg), one hour before any dental cleanings or procedures) 4 capsule 3     aspirin (ASA) 81 MG chewable tablet Take 1 tablet (81 mg) by mouth daily 120 tablet 0     Blood Glucose Monitoring Suppl (BLOOD GLUCOSE MONITOR SYSTEM) w/Device KIT three times a week       bumetanide (BUMEX) 1 MG  "tablet Take 2 tablets (2 mg) by mouth 2 times daily 360 tablet 1     colchicine (COLCYRS) 0.6 MG tablet Take 1 tablet (0.6 mg) by mouth 2 times daily 180 tablet 1     enoxaparin (LOVENOX) 100 MG/ML syringe Inject 100 mg (1 ml) every 12 hours as directed by the Anticoagulation Clinic. 10 Syringe 1     losartan (COZAAR) 25 MG tablet Take 1 tablet (25 mg) by mouth daily 90 tablet 3     metoprolol succinate ER (TOPROL XL) 25 MG 24 hr tablet Take 0.5 tablets (12.5 mg) by mouth daily 45 tablet 1     potassium chloride ER (K-DUR/KLOR-CON M) 10 MEQ CR tablet Take 3 tablets (30 mEq) by mouth daily 48 tablet 11     spironolactone (ALDACTONE) 25 MG tablet Take 1.5 tablets (37.5 mg) by mouth daily 145 tablet 3     tacrolimus (PROTOPIC) 0.1 % external ointment Apply topically 2 times daily (Patient not taking: Reported on 8/19/2019) 30 g 3     traZODone (DESYREL) 100 MG tablet TAKE 1 TABLET BY MOUTH EVERY DAY AT BEDTIME AS NEEDED FOR SLEEP 30 tablet 1     vitamin B1 (THIAMINE) 100 MG tablet Take 1 tablet (100 mg) by mouth daily 30 tablet 0     warfarin (COUMADIN) 2 MG tablet Take 3 mg PO daily at 6 pm until next INR check, then dose per INR goal 2-3 150 tablet 11       ROS:  Constitutional: Denies fever, chills, or diaphoresis.+weight gain  ENT: Denies visual disturbance, hearing loss, epistaxis, vertigo   Respiratory:  See HPI.     Cardiovascular: As per HPI.   GI: Denies nausea, vomiting, diarrhea, hematemesis, melena, or hematochezia.   : Denies urinary frequency, dysuria, or hematuria.   Integument: Negative for bruising, rash, or pruritis.  Psychiatric:+anxiety and depression,    Neuro: Negative for headaches, syncope, numbness or tingling.   Endocrinology: +DM  Musculoskeletal: Negative for gout, joint stiffness, swelling, or muscle weakness    EXAM:  BP (!) 78/0 (BP Location: Right arm, Patient Position: Chair, Cuff Size: Adult Regular)   Pulse 52   Ht 1.778 m (5' 10\")   Wt 109.8 kg (242 lb)   SpO2 97%   BMI 34.72 " kg/m     General: alert, articulate, and in no acute distress.  HEENT: normocephalic, atraumatic, anicteric sclera, EOMI, normal palate, mucosa moist.  JVD 11 cm with +HJR   Heart: LVAD hum present   Lungs: clear, no rales, ronchi, or wheezing.  No accessory muscle use, respirations unlabored.   Abdomen:  Non-tender, bowel sounds present, no hepatomegaly  Extremities: No cyanosis. No edema.  No palpable pedal pulses.  Neurological: Alert and oriented x 3.  Normal speech and affect, no gross motor deficits  Skin:  No ecchymoses, rashes, or clubbing.  Weekly driveline dressing CDI. No erythema or infection noted.      Labs:  CBC RESULTS:  Lab Results   Component Value Date    WBC 9.5 06/07/2019    RBC 5.21 06/07/2019    HGB 15.5 06/07/2019    HCT 47.8 06/07/2019    MCV 92 06/07/2019    MCH 29.8 06/07/2019    MCHC 32.4 06/07/2019    RDW 16.1 (H) 06/07/2019     06/07/2019       CMP RESULTS:  Lab Results   Component Value Date     06/07/2019    POTASSIUM 3.6 06/07/2019    CHLORIDE 99 06/07/2019    CO2 30 06/07/2019    ANIONGAP 5 06/07/2019     (H) 06/07/2019    BUN 17 06/07/2019    CR 0.98 06/07/2019    GFRESTIMATED 81 06/07/2019    GFRESTBLACK >90 06/07/2019    ASHLEE 9.0 06/07/2019    BILITOTAL 0.8 06/07/2019    ALBUMIN 3.9 06/07/2019    ALKPHOS 97 06/07/2019    ALT 24 06/07/2019    AST 26 06/07/2019        INR RESULTS:  Lab Results   Component Value Date    INR 2.05 (H) 08/19/2019       No components found for: CK  Lab Results   Component Value Date    MAG 2.1 12/13/2018     Lab Results   Component Value Date    NTBNP 2,244 (H) 12/20/2018     Device check today:    [place] seen in the [facility] for evaluation and iterative programming of a [employer], single lead ICD, per MD orders. He also has an appointment with Kylie Faye NP. Today his intrinsic rhythm is a slightly irregular ventricular rhythm with rates  bpm. The morphology does not change when HR jumps to 160 bpm. Normal ICD function-  "Threshold testing deferred d/t high ventricular rate. 109 tachy and 41 NSVT episodes have been recorded over the last 3 months. All EGMs show the same morphology, which is similar to his intrinsic rhythm.  <1%. Lead trends appear stable. Pt reports that he has \"been feeling some hard heart beats, but otherwise good\". Battery estimates 12 years to LINCOLN. Plan for patient to return to clinic in 3 months. ARAVIND Greer.  Single lead ICD.  I have reviewed and interpreted the device interrogation, settings, programming and nurse's summary. The device is functioning within normal device parameters. I agree with the current findings, assessment and plan.    Most recent echocardiogram 12/18:  Interpretation Summary  LVAD ramp study with settings ranging from 5300 to 5600 rpm. Please refer to  the EPIC report for measurements performed at different LVAD speed settings.     Baseline speed was 5500 rpm. At baseline setting there was moderate left  ventricular dilatation with LVIDd 6.4 cm. Right ventricular dilatation was  moderate and right ventricular systolic function appears mild-moderately  reduced. Ventricular septum deviates to the right.  Limited Doppler of inflow/outflow cannula.  Aortic valve does not open.  Mild to moderate aortic insufficiency is continuous and worse compared to  study done 12/11/18.  _____________________________________________________________________________    Assessment and Plan:    Fabiano is a 64 year old gentleman with NICM s/p HM III LVAD placement who appears hypervolemic today due to dietary indiscretion.  He has been drinking more than 2 L of fluid daily.  I advised him to decrease his fluid intake to 2 L daily or less.  I increased his Bumex to 3 mg in the morning and 2 mg in the afternoon and also increased his potassium to 20 mEq twice daily.    Since he has been experiencing more palpitations, I increased his metoprolol to 25 mg daily.  I will make further adjustments on his metoprolol " further once he returns from his trip to [place].  He will also get an electrolyte panel when he returns the first week of October.    1.   Acute on chronic systolic heart failure secondary to nonischemic cardiomyopathy. Stage D  NYHA Class  IIIA  ACEi/ARB: yes, continue losartan 25 mg daily  BB: Yes, metoprolol XL 25 mg daily.   aldosterone antagonist: yes, spironolactone 37.5 mg daily.  SCD prophylaxis: ICD  Fluid status:  Hypervolemic.      2.  S/P LVAD implant as  BTT,  Currently inactivated due to traveling out of [facility].  Anticoagulation: Warfarin with INR goal of 2-3. ACC managing.  Antiplatelet: Aspirin 81 mg daily.  MAP:  Controlled at 78.  LDH:  Stable at 208.  VAD Interrogation today: VAD interrogation reviewed with VAD coordinator. Agree with findings. Frequent PI events down to 2.2, speed drops from 5400.  No power spikes or other findings suspicious of pump malfunction noted.      3.  Hypertension: Controlled.  Continue metoprolol XL and losartan.     4.  NSVT: Increase Metoprolol as outlined above.     5.  Follow-up:   BMP first week of October. Dr. Watkins in December.      Kylie NARANJO, CNP  Advanced Heart Failure/LVAD clinic

## 2019-09-10 NOTE — PROGRESS NOTES
Addendum 19  Patient reports he will have his INR done tomorrow. [facility]        ANTICOAGULATION FOLLOW-UP CLINIC VISIT    19 ADDENDUM  Spoke to Fabiano.  He will go to [facility] lab in [place] tomorrow . TE    Patient Name:  Danielito Chu  Date:  9/10/2019  Contact Type:  Telephone    SUBJECTIVE:  Patient Findings     Positives:   Change in medications (metoprolol dose increased)    Comments:   Fabiano reports no changes in health, diet.  He is going to [place] 19 - 10/1/19.  INR order faxed to [facility] in [place], [place].        Clinical Outcomes     Comments:   Fabiano reports no changes in health, diet.  He is going to [place] 19 - 10/1/19.  INR order faxed to [facility] in [place], [place].           OBJECTIVE    INR   Date Value Ref Range Status   09/10/2019 2.29 (H) 0.86 - 1.14 Final     Factor 2 Assay   Date Value Ref Range Status   10/27/2018 70 60 - 140 % Final     Comment:     The Factor 2 activity level is not a screening test for the Prothrombin 51267   mutation.         ASSESSMENT / PLAN  INR assessment THER    Recheck INR In: 2 WEEKS    INR Location Clinic      Anticoagulation Summary  As of 9/10/2019    INR goal:   2.0-3.0   TTR:   86.4 % (8.7 mo)   INR used for dosin.29 (9/10/2019)   Warfarin maintenance plan:   4 mg (2 mg x 2) every Tue; 3 mg (2 mg x 1.5) all other days   Full warfarin instructions:   4 mg every Tue; 3 mg all other days   Weekly warfarin total:   22 mg   No change documented:   Jeanette Bryant RN   Plan last modified:   Giovanni Ordonez, [facility] (5/15/2019)   Next INR check:   2019   Priority:   INR   Target end date:   Indefinite    Indications    LVAD (left ventricular assist device) present (H) [Z95.811]             Anticoagulation Episode Summary     INR check location:       Preferred lab:       Send INR reminders to:   [facility]    Comments:   [place] [facility] [address] [address] [facility] [phone]/Fax [phone]  LVAD plced 18 ASA 81mg  Daily Jantoven 2mg tabs Lab [id number]  Call pt at [phone] Emphasize next INR date with pt++Send email each time++  Pt is home .      Anticoagulation Care Providers     Provider Role Specialty Phone number    Lorena Watkins MD Responsible Cardiology [phone]            See the Encounter Report to view Anticoagulation Flowsheet and Dosing Calendar (Go to Encounters tab in chart review, and find the Anticoagulation Therapy Visit)    Spoke with Fabiano.   Fabiano reports no changes in health, diet.  He is going to [place] 9/11/19 - 10/1/19.  INR order faxed to [facility] in [place], [place] at fax #:  [phone]  Fax confirmation received.  E-mail also sent to Fabiano.    Patient had LVAD placed on:   12/5/18  Type of LVAD: HM 3  Patient's current Aspirin dose: 81 mg daily  LVAD Protocol followed: Yes     Jeanette Bryant RN

## 2019-09-10 NOTE — NURSING NOTE
1). PUMP DATA  Primary controller serial number: [id number]    HM 3:   Flow: 5.1 L/min,    Speed: 5500 RPMs,     PI: 2.8 ,  Power: 4.5 [place], Hct: 49.3     Primary controller   Back up battery: Patient use: 13, Replace in: 21  Months     Data downloaded: No   Equipment and driveline assessed for damage: Yes     Back up : Serial number: did not bring it in from his truck because he has his equipment packed up for his flight tomorrow morning. I told Fabiano to pack it in a way so his EMERGENCY backup controller is with him within arms reach.    Programmed settings identical to the settings on the primary controller : did not see the backup controller      Education complete: Yes   Charge the BACKUP controller s backup battery every 6 months  Perform a self test on BACKUP every 6 months  Change the MPU s batteries every 6 months:Yes      2). ALARMS  Alarms reported by patient since last pump evaluation: No  Alarms or other finding noted during pump data history and alarm download: Yes, a moderate number of PI events with the PI dropping to 2.2 and the speed dropping once to 5400.    Action Taken:  Reviewed data with patient: Yes      3). DRESSING CHANGE / DRIVELINE ASSESSMENT  Dressing change completed today: No  Appearance of Driveline site: covered ith weekly, no draiage, not red.    Driveline stabilization: Method: [employer]  Teaching reinforced on need for stabilization of Driveline.     4).Pt. Education  D:  Pt education provided.  I:  Educated on MyChart  1.  How to message VAD Coordinator (send to MD but address to VAD Coordinator)  2. How to send photo via My Chart  3. When to use MyChart vs Call VAD Coordinator on Call.    A:  Pt verbalized understanding.  Pt is thinking about signing up for MY Chart.    P:  VAD Coordinator available for questions or concerns.  Will continue VAD education.

## 2019-09-10 NOTE — PATIENT INSTRUCTIONS
Medications:  1. Please increase your Bumex to twice daily 3mg in the morning and 2mg in the evening  2. Please increase your potassium chloride to 20mEq twice daily.  3. Please increase your metoprolol to 25mg daily    Follow-up:  1. Please get a set of labs on 10/3 when you are passing through town after your trip to [place].    Instructions:  1. Please limit your caffeine intake  2. Please lower your fluid intake to 2Liters / day    Page the VAD Coordinator on call if you gain more than 3 lb in a day or 5 in a week. Please also page if you feel unwell or have alarms.     Great to see you in clinic today. To Page the VAD Coordinator on call, dial [phone] option #4 and ask to speak to the VAD coordinator on call.

## 2019-09-10 NOTE — NURSING NOTE
Chief Complaint   Patient presents with     Follow Up     3 month VAD f/u     Vitals were taken and medications were reconciled.     Lu Jiménez CMA    8:12 AM

## 2019-09-10 NOTE — PROGRESS NOTES
HPI: Danielito Chu is a 63-year-old gentleman with a past medical history of DM 2, HTN, hyperlipidemia, SHIRLEY, mitral regurgitation, s/p ICD placement , and nonischemic cardiomyopathy with an EF of 15% s/p HeartMate 3 LVAD implant on 18 as BTT (status 4, but inactivated as he is leaving for [place] from -10/1/19), complicated by postoperative Flolan and pressor support. He returns for routine follow up.    Since his last visit, Fabiano has been experiencing more palpitations.  He feels pounding in his chest that is regular.  Denies PND, orthopnea, lower extremity edema.  He is eating too much.  He has gained 48 pounds since January.  He hasn't been exercising.  He admits that he has been drinking more than 2 L of fluid daily. His abdomen is more distended, but isn't firm.       He has no LVAD concerns.  His LVAD parameters are stable.  Denies HA, neurological changes, hematuria, hematochezia, melena, epistaxis, fever, chills or any concerns for driveline infection.    He is still trying to adjust to managing his LVAD on his own.  He worries about something happening to him as he lives alone.  His anxiety and depression are controlled per his report, but still something he deals with on a daily basis.       PAST MEDICAL HISTORY:  Past Medical History:   Diagnosis Date     Acute systolic congestive heart failure (H) 2017     Diabetes (H)      HTN (hypertension)      Hyperlipidemia      Liver disease      LVAD (left ventricular assist device) present (H)      3      Marijuana abuse      Mitral regurgitation      Nocturnal oxygen desaturation 3/29/2018     Nonischemic cardiomyopathy (H) 2017     SHIRLEY (obstructive sleep apnea)      Pacemaker 2017    ICD 17     Situational mixed anxiety and depressive disorder        FAMILY HISTORY:  Family History   Problem Relation Age of Onset     Heart Failure Father          at age 86     Heart Failure Paternal Uncle          at 66       Myocardial Infarction Paternal Uncle          at age 62     Coronary Artery Disease Mother          during CABG at age 41       SOCIAL HISTORY:  Social History     Socioeconomic History     Marital status: Single     Spouse name: Not on file     Number of children: Not on file     Years of education: Not on file     Highest education level: Not on file   Social Needs     Financial resource strain: Not on file     Food insecurity - worry: Not on file     Food insecurity - inability: Not on file     Transportation needs - medical: Not on file     Transportation needs - non-medical: Not on file   Occupational History     Not on file   Tobacco Use     Smoking status: Former Smoker     Smokeless tobacco: Never Used     Tobacco comment: quit 3/2017   Substance and Sexual Activity     Alcohol use: No     Frequency: Never     Comment: social     Drug use: No     Sexual activity: Not on file   Other Topics Concern     Parent/sibling w/ CABG, MI or angioplasty before 65F 55M? Not Asked   Social History Narrative     Not on file       CURRENT MEDICATIONS:  Current Outpatient Medications   Medication Sig Dispense Refill     allopurinol (ZYLOPRIM) 100 MG tablet Take 2 tablets (200 mg) by mouth daily 180 tablet 0     amoxicillin (AMOXIL) 500 MG capsule Take 4 capsules (2,000 mg) by mouth once as needed (Take 4 capsules (2000mg), one hour before any dental cleanings or procedures) 4 capsule 3     aspirin (ASA) 81 MG chewable tablet Take 1 tablet (81 mg) by mouth daily 120 tablet 0     Blood Glucose Monitoring Suppl (BLOOD GLUCOSE MONITOR SYSTEM) w/Device KIT three times a week       bumetanide (BUMEX) 1 MG tablet Take 2 tablets (2 mg) by mouth 2 times daily 360 tablet 1     colchicine (COLCYRS) 0.6 MG tablet Take 1 tablet (0.6 mg) by mouth 2 times daily 180 tablet 1     enoxaparin (LOVENOX) 100 MG/ML syringe Inject 100 mg (1 ml) every 12 hours as directed by the Anticoagulation Clinic. 10 Syringe 1     losartan (COZAAR) 25  "MG tablet Take 1 tablet (25 mg) by mouth daily 90 tablet 3     metoprolol succinate ER (TOPROL XL) 25 MG 24 hr tablet Take 0.5 tablets (12.5 mg) by mouth daily 45 tablet 1     potassium chloride ER (K-DUR/KLOR-CON M) 10 MEQ CR tablet Take 3 tablets (30 mEq) by mouth daily 48 tablet 11     spironolactone (ALDACTONE) 25 MG tablet Take 1.5 tablets (37.5 mg) by mouth daily 145 tablet 3     tacrolimus (PROTOPIC) 0.1 % external ointment Apply topically 2 times daily (Patient not taking: Reported on 8/19/2019) 30 g 3     traZODone (DESYREL) 100 MG tablet TAKE 1 TABLET BY MOUTH EVERY DAY AT BEDTIME AS NEEDED FOR SLEEP 30 tablet 1     vitamin B1 (THIAMINE) 100 MG tablet Take 1 tablet (100 mg) by mouth daily 30 tablet 0     warfarin (COUMADIN) 2 MG tablet Take 3 mg PO daily at 6 pm until next INR check, then dose per INR goal 2-3 150 tablet 11       ROS:  Constitutional: Denies fever, chills, or diaphoresis.+weight gain  ENT: Denies visual disturbance, hearing loss, epistaxis, vertigo   Respiratory:  See HPI.     Cardiovascular: As per HPI.   GI: Denies nausea, vomiting, diarrhea, hematemesis, melena, or hematochezia.   : Denies urinary frequency, dysuria, or hematuria.   Integument: Negative for bruising, rash, or pruritis.  Psychiatric:+anxiety and depression,    Neuro: Negative for headaches, syncope, numbness or tingling.   Endocrinology: +DM  Musculoskeletal: Negative for gout, joint stiffness, swelling, or muscle weakness    EXAM:  BP (!) 78/0 (BP Location: Right arm, Patient Position: Chair, Cuff Size: Adult Regular)   Pulse 52   Ht 1.778 m (5' 10\")   Wt 109.8 kg (242 lb)   SpO2 97%   BMI 34.72 kg/m    General: alert, articulate, and in no acute distress.  HEENT: normocephalic, atraumatic, anicteric sclera, EOMI, normal palate, mucosa moist.  JVD 11 cm with +HJR   Heart: LVAD hum present   Lungs: clear, no rales, ronchi, or wheezing.  No accessory muscle use, respirations unlabored.   Abdomen:  Non-tender, bowel " "sounds present, no hepatomegaly  Extremities: No cyanosis. No edema.  No palpable pedal pulses.  Neurological: Alert and oriented x 3.  Normal speech and affect, no gross motor deficits  Skin:  No ecchymoses, rashes, or clubbing.  Weekly driveline dressing CDI. No erythema or infection noted.      Labs:  CBC RESULTS:  Lab Results   Component Value Date    WBC 9.5 06/07/2019    RBC 5.21 06/07/2019    HGB 15.5 06/07/2019    HCT 47.8 06/07/2019    MCV 92 06/07/2019    MCH 29.8 06/07/2019    MCHC 32.4 06/07/2019    RDW 16.1 (H) 06/07/2019     06/07/2019       CMP RESULTS:  Lab Results   Component Value Date     06/07/2019    POTASSIUM 3.6 06/07/2019    CHLORIDE 99 06/07/2019    CO2 30 06/07/2019    ANIONGAP 5 06/07/2019     (H) 06/07/2019    BUN 17 06/07/2019    CR 0.98 06/07/2019    GFRESTIMATED 81 06/07/2019    GFRESTBLACK >90 06/07/2019    ASHLEE 9.0 06/07/2019    BILITOTAL 0.8 06/07/2019    ALBUMIN 3.9 06/07/2019    ALKPHOS 97 06/07/2019    ALT 24 06/07/2019    AST 26 06/07/2019        INR RESULTS:  Lab Results   Component Value Date    INR 2.05 (H) 08/19/2019       No components found for: CK  Lab Results   Component Value Date    MAG 2.1 12/13/2018     Lab Results   Component Value Date    NTBNP 2,244 (H) 12/20/2018     Device check today:    Pt seen in the [facility] for evaluation and iterative programming of a [employer], single lead ICD, per MD orders. He also has an appointment with Kylie Faye NP. Today his intrinsic rhythm is a slightly irregular ventricular rhythm with rates  bpm. The morphology does not change when HR jumps to 160 bpm. Normal ICD function- Threshold testing deferred d/t high ventricular rate. 109 tachy and 41 NSVT episodes have been recorded over the last 3 months. All EGMs show the same morphology, which is similar to his intrinsic rhythm.  <1%. Lead trends appear stable. Pt reports that he has \"been feeling some hard heart beats, but otherwise good\". " Battery estimates 12 years to [place]. Plan for patient to return to clinic in 3 months. ARAVIND Greer.  Single lead ICD.  I have reviewed and interpreted the device interrogation, settings, programming and nurse's summary. The device is functioning within normal device parameters. I agree with the current findings, assessment and plan.    Most recent echocardiogram 12/18:  Interpretation Summary  LVAD ramp study with settings ranging from 5300 to 5600 rpm. Please refer to  the EPIC report for measurements performed at different LVAD speed settings.     Baseline speed was 5500 rpm. At baseline setting there was moderate left  ventricular dilatation with LVIDd 6.4 cm. Right ventricular dilatation was  moderate and right ventricular systolic function appears mild-moderately  reduced. Ventricular septum deviates to the right.  Limited Doppler of inflow/outflow cannula.  Aortic valve does not open.  Mild to moderate aortic insufficiency is continuous and worse compared to  study done 12/11/18.  _____________________________________________________________________________    Assessment and Plan:    Fabiano is a 64 year old gentleman with NICM s/p HM III LVAD placement who appears hypervolemic today due to dietary indiscretion.  He has been drinking more than 2 L of fluid daily.  I advised him to decrease his fluid intake to 2 L daily or less.  I increased his Bumex to 3 mg in the morning and 2 mg in the afternoon and also increased his potassium to 20 mEq twice daily.    Since he has been experiencing more palpitations, I increased his metoprolol to 25 mg daily.  I will make further adjustments on his metoprolol further once he returns from his trip to [place].  He will also get an electrolyte panel when he returns the first week of October.    1.  Acute on chronic systolic heart failure secondary to nonischemic cardiomyopathy. Stage D  NYHA Class IIIA  ACEi/ARB: yes, continue losartan 25 mg daily  BB: Yes, metoprolol XL 25 mg  daily.   aldosterone antagonist: yes, spironolactone 37.5 mg daily.  SCD prophylaxis: ICD  Fluid status:  Hypervolemic.      2.  S/P LVAD implant as BTT,  Currently inactivated due to traveling out of state.  Anticoagulation: Warfarin with INR goal of 2-3. ACC managing.  Antiplatelet: Aspirin 81 mg daily.  MAP: Controlled at 78.  LDH:  Stable at 208.  VAD Interrogation today: VAD interrogation reviewed with VAD coordinator. Agree with findings. Frequent PI events down to 2.2, speed drops from 5400.  No power spikes or other findings suspicious of pump malfunction noted.      3.  Hypertension: Controlled.  Continue metoprolol XL and losartan.     4.  NSVT: Increase Metoprolol as outlined above.     5.  Follow-up:   BMP first week of October. Dr. Watkins in December.      Kylie NARANJO, CNP  Advanced Heart Failure/LVAD clinic

## 2019-09-11 ENCOUNTER — DOCUMENTATION ONLY (OUTPATIENT)
Dept: TRANSPLANT | Facility: CLINIC | Age: 64
End: 2019-09-11

## 2019-09-11 LAB
ACETAMINOPHEN QUAL: NEGATIVE
AMOBARBITAL QUAL: NEGATIVE
BARBITAL QUAL: NEGATIVE
BUTABARBITAL QUAL: NEGATIVE
BUTALBITAL QUAL: NEGATIVE
CAFFEINE QUAL: POSITIVE
CARBAMAZEPINE QUAL: NEGATIVE
CARISOPRODOL QUAL: NEGATIVE
CHLORPROPAMIDE UR-MCNC: NEGATIVE UG/ML
DRUGS SERPL SCN: NEGATIVE
ETHCLORVYNOL QUAL: NEGATIVE
ETHINAMATE QUAL: NEGATIVE
ETHOSUXIMIDE QUAL: NEGATIVE
ETHOTOIN QUAL: NEGATIVE
GLUTETHIMIDE QUAL: NEGATIVE
IBUPROFEN QUAL: NEGATIVE
MEPHENYTOIN QUAL: NEGATIVE
MEPHOBARBITAL QUAL: NEGATIVE
MEPROBAMATE QUAL: NEGATIVE
METHAQUALONE QUAL: NEGATIVE
METHARBITAL QUAL: NEGATIVE
METHSUXIMIDE QUAL: NEGATIVE
METHYPRYLON QUAL: NEGATIVE
PENTOBARBITAL QUAL: NEGATIVE
PHENACETIN QUAL: NEGATIVE
PHENOBARBITAL QUAL: NEGATIVE
PHENSUXIMIDE QUAL: NEGATIVE
PHENYTOIN QUAL: NEGATIVE
PRIMIDONE QUAL: NEGATIVE
SALICYLATE QUAL: NEGATIVE
SECOBARBITAL QUAL: NEGATIVE
TALBUTAL QUAL: NEGATIVE
THEOPHYLLINE QUAL: NEGATIVE
THIOPENTAL QUAL: NEGATIVE
TYBAMATE QUAL: NEGATIVE
VALPROIC ACID QUAL: NEGATIVE

## 2019-09-11 NOTE — PROGRESS NOTES
UNOS and Waitlist updated with patient's most recent adjusted VAD weight, adjusted VAD weight =clinic weight minus 5lbs  BMI:34.0  K.50

## 2019-09-12 LAB
MDC_IDC_LEAD_IMPLANT_DT: NORMAL
MDC_IDC_LEAD_LOCATION: NORMAL
MDC_IDC_LEAD_LOCATION_DETAIL_1: NORMAL
MDC_IDC_LEAD_MFG: NORMAL
MDC_IDC_LEAD_MODEL: NORMAL
MDC_IDC_LEAD_POLARITY_TYPE: NORMAL
MDC_IDC_LEAD_SERIAL: NORMAL
MDC_IDC_MSMT_BATTERY_STATUS: NORMAL
MDC_IDC_MSMT_CAP_CHARGE_DTM: NORMAL
MDC_IDC_MSMT_CAP_CHARGE_ENERGY: 0 J
MDC_IDC_MSMT_CAP_CHARGE_TIME: 0 S
MDC_IDC_MSMT_CAP_CHARGE_TIME: 10.39 S
MDC_IDC_MSMT_CAP_CHARGE_TYPE: NORMAL
MDC_IDC_MSMT_CAP_CHARGE_TYPE: NORMAL
MDC_IDC_MSMT_LEADCHNL_RV_IMPEDANCE_VALUE: 385 OHM
MDC_IDC_MSMT_LEADCHNL_RV_SENSING_INTR_AMPL: 4.1 MV
MDC_IDC_PG_IMPLANT_DTM: NORMAL
MDC_IDC_PG_MFG: NORMAL
MDC_IDC_PG_MODEL: NORMAL
MDC_IDC_PG_SERIAL: NORMAL
MDC_IDC_PG_TYPE: NORMAL
MDC_IDC_SESS_CLINIC_NAME: NORMAL
MDC_IDC_SESS_DTM: NORMAL
MDC_IDC_SESS_TYPE: NORMAL
MDC_IDC_SET_BRADY_HYSTRATE: NORMAL
MDC_IDC_SET_BRADY_LOWRATE: 40 {BEATS}/MIN
MDC_IDC_SET_BRADY_MODE: NORMAL
MDC_IDC_SET_LEADCHNL_RV_PACING_AMPLITUDE: 3 V
MDC_IDC_SET_LEADCHNL_RV_PACING_ANODE_ELECTRODE_1: NORMAL
MDC_IDC_SET_LEADCHNL_RV_PACING_ANODE_LOCATION_1: NORMAL
MDC_IDC_SET_LEADCHNL_RV_PACING_CAPTURE_MODE: NORMAL
MDC_IDC_SET_LEADCHNL_RV_PACING_CATHODE_ELECTRODE_1: NORMAL
MDC_IDC_SET_LEADCHNL_RV_PACING_CATHODE_LOCATION_1: NORMAL
MDC_IDC_SET_LEADCHNL_RV_PACING_POLARITY: NORMAL
MDC_IDC_SET_LEADCHNL_RV_PACING_PULSEWIDTH: 0.5 MS
MDC_IDC_SET_LEADCHNL_RV_SENSING_ADAPTATION_MODE: NORMAL
MDC_IDC_SET_LEADCHNL_RV_SENSING_ANODE_ELECTRODE_1: NORMAL
MDC_IDC_SET_LEADCHNL_RV_SENSING_ANODE_LOCATION_1: NORMAL
MDC_IDC_SET_LEADCHNL_RV_SENSING_CATHODE_ELECTRODE_1: NORMAL
MDC_IDC_SET_LEADCHNL_RV_SENSING_CATHODE_LOCATION_1: NORMAL
MDC_IDC_SET_LEADCHNL_RV_SENSING_POLARITY: NORMAL
MDC_IDC_SET_LEADCHNL_RV_SENSING_SENSITIVITY: 0.6 MV
MDC_IDC_SET_ZONE_DETECTION_INTERVAL: 250 MS
MDC_IDC_SET_ZONE_DETECTION_INTERVAL: 300 MS
MDC_IDC_SET_ZONE_DETECTION_INTERVAL: 375 MS
MDC_IDC_SET_ZONE_TYPE: NORMAL
MDC_IDC_SET_ZONE_VENDOR_TYPE: NORMAL
MDC_IDC_STAT_EPISODE_RECENT_COUNT: 109
MDC_IDC_STAT_EPISODE_RECENT_COUNT: 109
MDC_IDC_STAT_EPISODE_RECENT_COUNT: 41
MDC_IDC_STAT_EPISODE_RECENT_COUNT_DTM_END: NORMAL
MDC_IDC_STAT_EPISODE_RECENT_COUNT_DTM_START: NORMAL
MDC_IDC_STAT_EPISODE_TOTAL_COUNT: 128
MDC_IDC_STAT_EPISODE_TOTAL_COUNT: 128
MDC_IDC_STAT_EPISODE_TOTAL_COUNT: 347
MDC_IDC_STAT_EPISODE_TOTAL_COUNT_DTM_END: NORMAL
MDC_IDC_STAT_EPISODE_TYPE: NORMAL
MDC_IDC_STAT_EPISODE_VENDOR_TYPE: NORMAL
MDC_IDC_STAT_TACHYTHERAPY_ATP_DELIVERED_RECENT: 0
MDC_IDC_STAT_TACHYTHERAPY_ATP_DELIVERED_TOTAL: 0
MDC_IDC_STAT_TACHYTHERAPY_RECENT_DTM_END: NORMAL
MDC_IDC_STAT_TACHYTHERAPY_RECENT_DTM_START: NORMAL
MDC_IDC_STAT_TACHYTHERAPY_SHOCKS_ABORTED_RECENT: 0
MDC_IDC_STAT_TACHYTHERAPY_SHOCKS_ABORTED_TOTAL: 0
MDC_IDC_STAT_TACHYTHERAPY_SHOCKS_DELIVERED_RECENT: 0
MDC_IDC_STAT_TACHYTHERAPY_SHOCKS_DELIVERED_TOTAL: 0
MDC_IDC_STAT_TACHYTHERAPY_TOTAL_DTM_END: NORMAL

## 2019-09-26 ENCOUNTER — CARE COORDINATION (OUTPATIENT)
Dept: CARDIOLOGY | Facility: CLINIC | Age: 64
End: 2019-09-26

## 2019-09-26 DIAGNOSIS — I50.22 CHRONIC SYSTOLIC CONGESTIVE HEART FAILURE (H): Primary | ICD-10-CM

## 2019-09-27 LAB — INR PPP: 2.4

## 2019-09-27 NOTE — PROGRESS NOTES
Addendum 9/27/19 Pt called reporting he will be getting his INR drawn today and that this will be done via venous draw and that it will take about 3 days to get these results. Will call on Tuesday 10/1 if we have not gotten the results or heard from the pt. Kristie Fernandez RN

## 2019-09-30 ENCOUNTER — DOCUMENTATION ONLY (OUTPATIENT)
Dept: TRANSPLANT | Facility: CLINIC | Age: 64
End: 2019-09-30

## 2019-10-01 ENCOUNTER — TELEPHONE (OUTPATIENT)
Dept: TRANSPLANT | Facility: CLINIC | Age: 64
End: 2019-10-01

## 2019-10-01 ENCOUNTER — ANTICOAGULATION THERAPY VISIT (OUTPATIENT)
Dept: ANTICOAGULATION | Facility: CLINIC | Age: 64
End: 2019-10-01

## 2019-10-01 DIAGNOSIS — Z95.811 LVAD (LEFT VENTRICULAR ASSIST DEVICE) PRESENT (H): ICD-10-CM

## 2019-10-01 NOTE — TELEPHONE ENCOUNTER
Transplant Update 10/01/19:  Topic:  Travel    Called patient to verify he was back in town as he was re-activated on the transplant list.  He reports having his trip extended and will not be back until next week.  Will inactivate and patient will call back next week.

## 2019-10-01 NOTE — PROGRESS NOTES
10/1/19  Patient will return to [place] for next lab draw.  Updated calendar.  ANTICOAGULATION FOLLOW-UP CLINIC VISIT    Patient Name:  Danielito Chu  Date:  10/1/2019  Contact Type:  Telephone    SUBJECTIVE:         OBJECTIVE    INR   Date Value Ref Range Status   2019 2.4  Final     Comment:     [facility]     Factor 2 Assay   Date Value Ref Range Status   10/27/2018 70 60 - 140 % Final     Comment:     The Factor 2 activity level is not a screening test for the Prothrombin 70593   mutation.         ASSESSMENT / PLAN  INR assessment THER    Recheck INR In: 2 WEEKS    INR Location Outside lab      Anticoagulation Summary  As of 10/1/2019    INR goal:   2.0-3.0   TTR:   87.2 % (9.3 mo)   INR used for dosin.4 (2019)   Warfarin maintenance plan:   4 mg (2 mg x 2) every Tue; 3 mg (2 mg x 1.5) all other days   Full warfarin instructions:   4 mg every Tue; 3 mg all other days   Weekly warfarin total:   22 mg   No change documented:   Fady Avelar RN   Plan last modified:   Giovanni Ordonez, [facility] (5/15/2019)   Next INR check:   10/11/2019   Priority:   INR   Target end date:   Indefinite    Indications    LVAD (left ventricular assist device) present (H) [Z95.811]             Anticoagulation Episode Summary     INR check location:       Preferred lab:       Send INR reminders to:   [facility]    Comments:   [place] [facility] [address] [facility] [phone]/Fax [phone]  LVAD plced 18 ASA 81mg Daily Jantoven 2mg tabs Lab [phone]/[phone]  Call pt at [phone] Emphasize next INR date with pt++Send email each time++  Pt is home .      Anticoagulation Care Providers     Provider Role Specialty Phone number    Lorena Watkins MD [facility] Cardiology [phone]            See the Encounter Report to view Anticoagulation Flowsheet and Dosing Calendar (Go to Encounters tab in chart review, and find the Anticoagulation Therapy Visit)    INR/CFX/F2 RESULT:  INR result is 2.4 per [facility] on 9/27    ASSESSMENT:No Problems found. No Changes in Health, Medications, or diet. No Signs of bruising, bleeding or clotting.    DOSING ADJUSTMENT:Continue current maintenance dose    NEXT INR/FACTOR X OR FACTOR II: 10/11    PROTOCOL FOLLOWED: LVAD  HM 3 placed 12/5/18, ASA 81mg daily.      Fady Avelar RN

## 2019-10-01 NOTE — PROGRESS NOTES
Status Change-[facility] 09/30/2019    Changed patient's listing status in UNOS from Inactive to Active status 4 for the following reasons: Back from travel     Patient aware of change and letter sent per regulatory guidelines, primary cardiology team aware.

## 2019-10-01 NOTE — LETTER
October 1, 2019    Danielito Gonzales Vlad  [address] [facility]  [place] [address]      Dear Mr. Chu,    This letter is sent to notify you that your listing status was changed from Status 7 to Status 4 the heart transplant waitlist at the [facility].      During this waiting period, we may still request periodic laboratory tests with your primary physician.  It will be your responsibility to remind your physician to forward your results to the Transplant Office.    We still need to be kept informed of any changes such as:    o changes in your insurance coverage  o change in your phone number, address or emergency contact  o significant changes in your health  o significant infections (such as pneumonia or abscesses)  o blood transfusions  o any condition which requires hospitalization or surgery    Sincerely,        Heart Transplant Program    Enclosures: UNOS Letter

## 2019-10-07 ENCOUNTER — TELEPHONE (OUTPATIENT)
Dept: TRANSPLANT | Facility: CLINIC | Age: 64
End: 2019-10-07

## 2019-10-07 NOTE — TELEPHONE ENCOUNTER
Patient Call:  Would like to get back on the list.          Call back needed? Yes    Return Call Needed  Same as documented in contacts section  When to return call?: Same day: Route High Priority

## 2019-10-07 NOTE — TELEPHONE ENCOUNTER
Called patient back, patient is back in town and wanted to make sure he was re-activated on the list.  Writer explained that he was and verified he received a letter as well.  Patient verbalized understanding of the plan and agrees to call Transplant Coordinator with any further questions or concerns.

## 2019-10-17 ENCOUNTER — ANTICOAGULATION THERAPY VISIT (OUTPATIENT)
Dept: ANTICOAGULATION | Facility: CLINIC | Age: 64
End: 2019-10-17

## 2019-10-17 DIAGNOSIS — Z95.811 LVAD (LEFT VENTRICULAR ASSIST DEVICE) PRESENT (H): ICD-10-CM

## 2019-10-17 LAB — INR PPP: 2.9

## 2019-10-17 NOTE — PROGRESS NOTES
ADDENDUM from 10/25:  Pt is phoned on this date reminding him to check the INR.  He will get this on 10/28.  He states he is not feeling particularly well.  He is aware to phone the LVAD coordinator if he runs a fever.  Yakov NAZARIO        ANTICOAGULATION FOLLOW-UP CLINIC VISIT    Patient Name:  Danielito Chu  Date:  10/17/2019  Contact Type:  Telephone    SUBJECTIVE:         OBJECTIVE    INR   Date Value Ref Range Status   10/17/2019 2.9  Final     Factor 2 Assay   Date Value Ref Range Status   10/27/2018 70 60 - 140 % Final     Comment:     The Factor 2 activity level is not a screening test for the Prothrombin 31252   mutation.         ASSESSMENT / PLAN  INR assessment THER    Recheck INR In: 8 DAYS    INR Location Outside lab      Anticoagulation Summary  As of 10/17/2019    INR goal:   2.0-3.0   TTR:   88.0 % (10 mo)   INR used for dosin.9 (10/17/2019)   Warfarin maintenance plan:   4 mg (2 mg x 2) every Tue; 3 mg (2 mg x 1.5) all other days   Full warfarin instructions:   4 mg every Tue; 3 mg all other days   Weekly warfarin total:   22 mg   No change documented:   Rosmery Hickman, RN   Plan last modified:   Giovanni Ordonez, [facility] (5/15/2019)   Next INR check:   10/25/2019   Priority:   INR   Target end date:   Indefinite    Indications    LVAD (left ventricular assist device) present (H) [Z95.811]             Anticoagulation Episode Summary     INR check location:       Preferred lab:       Send INR reminders to:    [facility]    Comments:   [place] [facility] [phone]/Fax [phone]  LVAD plced 18 ASA 81mg Daily Jantoven 2mg tabs Lab [phone]  Call pt at [phone] Emphasize next INR date with pt++Send email each time++  Pt is home .      Anticoagulation Care Providers     Provider Role Specialty Phone number    Lorena Watkins MD Responsible Cardiology [phone]            See the Encounter Report to view Anticoagulation  Flowsheet and Dosing Calendar (Go to Encounters tab in chart review, and find the Anticoagulation Therapy Visit)    Spoke with patient. Gave them their lab results and new warfarin recommendation.  No changes in health, medication, or diet. No missed doses, no falls. No signs or symptoms of bleed or clotting.  Pt reports no need to send an email today     Rosmery Hickman RN

## 2019-10-23 ENCOUNTER — CARE COORDINATION (OUTPATIENT)
Dept: CARDIOLOGY | Facility: CLINIC | Age: 64
End: 2019-10-23

## 2019-10-23 DIAGNOSIS — I50.22 CHRONIC SYSTOLIC CONGESTIVE HEART FAILURE (H): Primary | ICD-10-CM

## 2019-10-23 NOTE — PROGRESS NOTES
Fabiano called in with questions about his speed fluctuations. He is set at 5500 and he reports seeing 5550 and 5600. I told him that it is normal for the HM 3 to vary its speed + or - 50-100rpm. He said he knew that but he has never noticed it varying so much before. His VAD numbers are stable with pi 2.5-3.5, flow in the 4s.     Fabiano will be in [place] on 11/1 on his way to the airport. I asked him to get labs at the [facility].

## 2019-10-25 ENCOUNTER — TELEPHONE (OUTPATIENT)
Dept: TRANSPLANT | Facility: CLINIC | Age: 64
End: 2019-10-25

## 2019-10-25 NOTE — TELEPHONE ENCOUNTER
Pt wanted to connect with his RN Tx Coordinator to let her know he will be out of town the following dates:    11/2-11/16    If the Tx team has any questions feel free to connect with the pt.

## 2019-10-28 ENCOUNTER — ANTICOAGULATION THERAPY VISIT (OUTPATIENT)
Dept: ANTICOAGULATION | Facility: CLINIC | Age: 64
End: 2019-10-28

## 2019-10-28 DIAGNOSIS — Z95.811 LVAD (LEFT VENTRICULAR ASSIST DEVICE) PRESENT (H): ICD-10-CM

## 2019-10-28 LAB — INR PPP: 3.9

## 2019-10-28 NOTE — PROGRESS NOTES
ANTICOAGULATION FOLLOW-UP CLINIC VISIT    Patient Name:  Danielito Chu  Date:  10/28/2019  Contact Type:  Telephone    SUBJECTIVE:  Patient Findings     Comments:   Pt hasn't had a flu shot yet, but is planning on doing this. [facility] [place] [facility] [place] [facility] [place] for the LVAD team         Clinical Outcomes     Negatives:   Major bleeding event, Thromboembolic event, Anticoagulation-related hospital admission, Anticoagulation-related ED visit, Anticoagulation-related fatality    Comments:   Pt hasn't had a flu shot yet, but is planning on doing this. Pt is coming to the [facility] to get some tests done for the LVAD team            OBJECTIVE    INR   Date Value Ref Range Status   10/28/2019 3.90  Final     Comment:     Outside Lab      Factor 2 Assay   Date Value Ref Range Status   10/27/2018 70 60 - 140 % Final     Comment:     The Factor 2 activity level is not a screening test for the Prothrombin 02345   mutation.         ASSESSMENT / PLAN  INR assessment SUPRA    Recheck INR In: 4 DAYS    INR Location Outside lab      Anticoagulation Summary  As of 10/28/2019    INR goal:   2.0-3.0   TTR:   85.3 % (10.3 mo)   INR used for dosing:   3.90! (10/28/2019)   Warfarin maintenance plan:   4 mg (2 mg x 2) every Tue; 3 mg (2 mg x 1.5) all other days   Full warfarin instructions:   10/28: 2 mg; Otherwise 4 mg every Tue; 3 mg all other days   Weekly warfarin total:   22 mg   Plan last modified:   Giovanni Ordonez, [facility] (5/15/2019)   Next INR check:   11/1/2019   Priority:   INR   Target end date:   Indefinite    Indications    LVAD (left ventricular assist device) present (H) [Z95.811]             Anticoagulation Episode Summary     INR check location:       Preferred lab:       Send INR reminders to:   [facility]    Comments:   [place] 4/27-5[facility]8 [facility] [phone]/Fax [phone]  LVAD plced 12/5/18 ASA 81mg Daily Jantoven 2mg tabs Lab [phone]  Call pt at [phone]  Emphasize next INR date with pt++Send email each time++  Pt is home .      Anticoagulation Care Providers     Provider Role Specialty Phone number    Lorena Watkins MD Responsible Cardiology [phone]            See the Encounter Report to view Anticoagulation Flowsheet and Dosing Calendar (Go to Encounters tab in chart review, and find the Anticoagulation Therapy Visit)    Spoke with patient. Gave them their lab results and new warfarin recommendation.  No changes in health, medication, or diet. No missed doses, no falls. No signs or symptoms of bleed or clotting.     Patient had LVAD placed on:   12/5/18  Type of LVAD: HM 3  Patient's current Aspirin dose: ASA 81mg Daily  LVAD Protocol followed: Yes   If Not Followed Explanation:  N/A    Kristie Fernandez RN

## 2019-10-31 NOTE — TELEPHONE ENCOUNTER
Pt called  Wants Ashley to email him back that she got the message while he is out of town and unavailable for a tx

## 2019-11-01 ENCOUNTER — ANTICOAGULATION THERAPY VISIT (OUTPATIENT)
Dept: ANTICOAGULATION | Facility: CLINIC | Age: 64
End: 2019-11-01

## 2019-11-01 DIAGNOSIS — Z95.811 LVAD (LEFT VENTRICULAR ASSIST DEVICE) PRESENT (H): ICD-10-CM

## 2019-11-01 DIAGNOSIS — I50.22 CHRONIC SYSTOLIC CONGESTIVE HEART FAILURE (H): ICD-10-CM

## 2019-11-01 DIAGNOSIS — Z94.1 HEART REPLACED BY TRANSPLANT (H): ICD-10-CM

## 2019-11-01 LAB
ALBUMIN SERPL-MCNC: 4.2 G/DL (ref 3.4–5)
ALP SERPL-CCNC: 92 U/L (ref 40–150)
ALT SERPL W P-5'-P-CCNC: 29 U/L (ref 0–70)
ANION GAP SERPL CALCULATED.3IONS-SCNC: 6 MMOL/L (ref 3–14)
AST SERPL W P-5'-P-CCNC: 29 U/L (ref 0–45)
BILIRUB SERPL-MCNC: 1 MG/DL (ref 0.2–1.3)
BUN SERPL-MCNC: 20 MG/DL (ref 7–30)
CALCIUM SERPL-MCNC: 9.7 MG/DL (ref 8.5–10.1)
CHLORIDE SERPL-SCNC: 97 MMOL/L (ref 94–109)
CO2 SERPL-SCNC: 32 MMOL/L (ref 20–32)
CREAT SERPL-MCNC: 1.36 MG/DL (ref 0.66–1.25)
CREAT UR-MCNC: 143 MG/DL
D DIMER PPP FEU-MCNC: 1 UG/ML FEU (ref 0–0.5)
ERYTHROCYTE [DISTWIDTH] IN BLOOD BY AUTOMATED COUNT: 14.3 % (ref 10–15)
GFR SERPL CREATININE-BSD FRML MDRD: 55 ML/MIN/{1.73_M2}
GLUCOSE SERPL-MCNC: 115 MG/DL (ref 70–99)
HCT VFR BLD AUTO: 49.5 % (ref 40–53)
HGB BLD-MCNC: 16.9 G/DL (ref 13.3–17.7)
INR PPP: 2.1 (ref 0.86–1.14)
LDH SERPL L TO P-CCNC: 230 U/L (ref 85–227)
MCH RBC QN AUTO: 32.7 PG (ref 26.5–33)
MCHC RBC AUTO-ENTMCNC: 34.1 G/DL (ref 31.5–36.5)
MCV RBC AUTO: 96 FL (ref 78–100)
PLATELET # BLD AUTO: 291 10E9/L (ref 150–450)
POTASSIUM SERPL-SCNC: 3.7 MMOL/L (ref 3.4–5.3)
PROT SERPL-MCNC: 8.8 G/DL (ref 6.8–8.8)
RBC # BLD AUTO: 5.17 10E12/L (ref 4.4–5.9)
SODIUM SERPL-SCNC: 134 MMOL/L (ref 133–144)
WBC # BLD AUTO: 8.3 10E9/L (ref 4–11)

## 2019-11-01 NOTE — PROGRESS NOTES
ANTICOAGULATION FOLLOW-UP CLINIC VISIT    Patient Name:  Danielito Chu  Date:  2019  Contact Type:  Telephone    SUBJECTIVE:  Patient Findings     Comments:   Fabiano reports no changes in health, diet, medications.  He will be leaving for [place] for two weeks 19.  He will have his INR checked at [facility] while in [place].  Supra therapeutic INR on 10/28/19 is unexplained.        Clinical Outcomes     Comments:   Fabiano reports no changes in health, diet, medications.  He will be leaving for [place] for two weeks 19.  He will have his INR checked at [facility] while in [place].  Supra therapeutic INR on 10/28/19 is unexplained.           OBJECTIVE    INR   Date Value Ref Range Status   2019 2.10 (H) 0.86 - 1.14 Final     Factor 2 Assay   Date Value Ref Range Status   10/27/2018 70 60 - 140 % Final     Comment:     The Factor 2 activity level is not a screening test for the Prothrombin 85032   mutation.         ASSESSMENT / PLAN  INR assessment THER    Recheck INR In: 6 DAYS    INR Location Clinic      Anticoagulation Summary  As of 2019    INR goal:   2.0-3.0   TTR:   84.8 % (10.5 mo)   INR used for dosin.10 (2019)   Warfarin maintenance plan:   4 mg (2 mg x 2) every Tue; 3 mg (2 mg x 1.5) all other days   Full warfarin instructions:   4 mg every Tue; 3 mg all other days   Weekly warfarin total:   22 mg   No change documented:   Jeanette Bryant RN   Plan last modified:   Giovanni Ordonez, [facility] (5/15/2019)   Next INR check:   2019   Priority:   INR   Target end date:   Indefinite    Indications    LVAD (left ventricular assist device) present (H) [Z95.811]             Anticoagulation Episode Summary     INR check location:       Preferred lab:       Send INR reminders to:   [facility]    Comments:   [place] - [facility] [address]  [phone]/Fax [phone]  LVAD plced 18  3 ASA 81mg Daily Jantoven 2mg tabs Lab  [phone]  Call pt at [phone] Emphasize next INR date with pt++Send email each time++  Pt is home .      Anticoagulation Care Providers     Provider Role Specialty Phone number    Lorena Watkins MD Responsible Cardiology [phone]            See the Encounter Report to view Anticoagulation Flowsheet and Dosing Calendar (Go to Encounters tab in chart review, and find the Anticoagulation Therapy Visit)    Spoke with Fabiano.  He reports no changes in health, diet, medications.  He will be going to [place] for two weeks, leaving [place] on 11/2/19.  He will check his INR at [facility] in [place].      Patient had LVAD placed on:   12/5/18  Type of LVAD: HM 3  Patient's current Aspirin dose: 81 mg daily  LVAD Protocol followed: Yes      Jeanette Bryant RN

## 2019-11-04 ENCOUNTER — CARE COORDINATION (OUTPATIENT)
Dept: CARDIOLOGY | Facility: CLINIC | Age: 64
End: 2019-11-04

## 2019-11-04 ENCOUNTER — DOCUMENTATION ONLY (OUTPATIENT)
Dept: TRANSPLANT | Facility: CLINIC | Age: 64
End: 2019-11-04

## 2019-11-04 DIAGNOSIS — Z95.811 LVAD (LEFT VENTRICULAR ASSIST DEVICE) PRESENT (H): ICD-10-CM

## 2019-11-04 DIAGNOSIS — I50.22 CHRONIC SYSTOLIC CONGESTIVE HEART FAILURE (H): Primary | ICD-10-CM

## 2019-11-04 RX ORDER — BUMETANIDE 1 MG/1
2 TABLET ORAL 2 TIMES DAILY
Qty: 150 TABLET | Refills: 11 | Status: SHIPPED | OUTPATIENT
Start: 2019-11-04 | End: 2019-11-14

## 2019-11-04 NOTE — LETTER
November 4, 2019    Danielito Chu  [address]  [place] [address]      Dear Mr. Chu,    This letter is sent to notify you that your listing status was changed from Status 4 to Status 7 on the heart transplant waitlist at the [facility].  Your status was changed because of travel.    During this waiting period, we may still request periodic laboratory tests with your primary physician.  It will be your responsibility to remind your physician to forward your results to the Transplant Office.    We still need to be kept informed of any changes such as:    o changes in your insurance coverage  o change in your phone number, address or emergency contact  o significant changes in your health  o significant infections (such as pneumonia or abscesses)  o blood transfusions  o any condition which requires hospitalization or surgery    Sincerely,        Heart Transplant Program    Enclosures: UNOS Letter

## 2019-11-04 NOTE — PROGRESS NOTES
Back in September, Jaycee's bumex and potassium were increased. We rechecked labs on 11/1. His creat is elevated from 1.16 to 1.36, K is down from 4.0 to 3.7, hematocrit up to 49.5.     He said his urine is pretty concentrated. His VAD numbers are stable 5500, 4.8 flow, 2.8 PI, 4.6 pwr. He said he feels great.     Kylie AGUILAR the NP said to decrease to Bumex to  2 mg BID and to keep the K where it is.     Plan to recheck labs when Jaycee returns from [place] on 11/18    Results for JAYCEE SHEN (MRN [id number]) as of 11/4/2019 09:47     11/1/2019 14:35   Sodium: 134   Potassium: 3.7   Chloride: 97   Carbon Dioxide: 32   Urea Nitrogen: 20   Creatinine: 1.36 (H)   GFR Estimate: 55 (L)   GFR Estimate If Black: 64   Calcium: 9.7   Anion Gap: 6   Albumin: 4.2   Protein Total: 8.8   Bilirubin Total: 1.0   Alkaline Phosphatase: 92   ALT: 29   AST: 29   Lactate Dehydrogenase: 230 (H)

## 2019-11-04 NOTE — PROGRESS NOTES
Status Change-OS 11-19    Changed patient's listing status in UNOS from status 4 to status 7 for the following reasons:  Travel     Patient aware of change and letter sent per regulatory guidelines, primary cardiology team aware.

## 2019-11-06 LAB
CANNABINOIDS UR CFM-MCNC: <5 NG/ML
CARBOXYTHC/CREAT UR: NORMAL NG/MG{CREAT}

## 2019-11-07 ENCOUNTER — TELEPHONE (OUTPATIENT)
Dept: TRANSPLANT | Facility: CLINIC | Age: 64
End: 2019-11-07

## 2019-11-07 DIAGNOSIS — Z76.82 AWAITING ORGAN TRANSPLANT: ICD-10-CM

## 2019-11-07 DIAGNOSIS — I50.20 SYSTOLIC HEART FAILURE (H): Primary | ICD-10-CM

## 2019-11-08 NOTE — PROGRESS NOTES
Addendum 11/8/19  S[poke with Fabiano today he is in [place]. He will get an INR on 11/18/19 when he is back home.  He missed his PM Warfarin dose on 11/7/19 but took it at 2AM 11/8. He will return to his normal maintenance dose Chandra rojas [facility]

## 2019-11-11 DIAGNOSIS — G47.00 INSOMNIA, UNSPECIFIED TYPE: ICD-10-CM

## 2019-11-12 NOTE — TELEPHONE ENCOUNTER
traZODone (DESYREL) 100 MG tablet   Last Written Prescription Date:  3/7/19  Last Fill Quantity: 30,   # refills: 1  Last Office Visit : 9/10/19  Future Office visit:  12/6/19      Routing refill request to provider for review/approval because: not on protocol

## 2019-11-14 ENCOUNTER — CARE COORDINATION (OUTPATIENT)
Dept: CARDIOLOGY | Facility: CLINIC | Age: 64
End: 2019-11-14

## 2019-11-14 DIAGNOSIS — Z95.811 LVAD (LEFT VENTRICULAR ASSIST DEVICE) PRESENT (H): ICD-10-CM

## 2019-11-14 DIAGNOSIS — I50.22 CHRONIC SYSTOLIC CONGESTIVE HEART FAILURE (H): ICD-10-CM

## 2019-11-14 RX ORDER — TRAZODONE HYDROCHLORIDE 100 MG/1
100 TABLET ORAL
Qty: 90 TABLET | OUTPATIENT
Start: 2019-11-14

## 2019-11-14 RX ORDER — BUMETANIDE 1 MG/1
2 TABLET ORAL 2 TIMES DAILY
Qty: 360 TABLET | Refills: 3 | COMMUNITY
Start: 2019-11-14 | End: 2020-01-01

## 2019-11-14 NOTE — LETTER
Patient Name: Danielito Chu   : 1955   Diagnosis/ICD-10: Heart Failure, unspecified I50.9; LVAD Z95.811   Requesting Physician: Dr. Lorena Watkins   Date of Request: 19   Date to Draw 19     **Please fax results to Asim Elias RN VAD Coord at [phone].                Call [phone] with Questions      ORDER CODE TESTS TAMEKA.VOL.   X CBASIC Na, K, Cl, CO2, Crea, BUN, Glu, Ca GG 0.5-1    BHEPAT Alb, AlkP, ALT, AST, BBil, TP GG 0.5-1    BLIP Chol, Trig, HDL, LDL GG 1-2    CCOMP Na, K, Cl, CO2, Crea, BUN, Glu, Ca, Alb, AlkP, ALT, AST, Bbil, TP,  GG 0.6-1    HGP CBC & Platelet P 0.3-1    HGDP CBC, Differential & Platelet P 0.3-1    PLT Platelet  P 0.3-1   X INR INR B 2.7    PTT PTT B 2.7    LD Lactate Dehydrogenase GG 0.3-1    PHGB Plasma Hemoglobin GG 2-3    Pre-Albumin Pre-Albumin RG 1.0                  Signed,      Lorena Watkins MD  Heart Failure, Mechanical Circulatory Support and Transplant Cardiology   of Medicine,  Division of Cardiology, [facility]

## 2019-11-14 NOTE — PROGRESS NOTES
Noted a refill request sent to Cardiology clinic for Trazadone. Called pt to request that he send that request to his PCP. Pt was agreeable. Pt noted that he had been contacted to get repeat labs when he returns from his trip to [place], and requested that I send order so he can get labs drawn next week.   Sent order for repeat BMP to [facility] per pt request.

## 2019-11-16 DIAGNOSIS — I50.23 ACUTE ON CHRONIC SYSTOLIC HEART FAILURE (H): ICD-10-CM

## 2019-11-18 ENCOUNTER — TELEPHONE (OUTPATIENT)
Dept: TRANSPLANT | Facility: CLINIC | Age: 64
End: 2019-11-18

## 2019-11-18 ENCOUNTER — CARE COORDINATION (OUTPATIENT)
Dept: CARDIOLOGY | Facility: CLINIC | Age: 64
End: 2019-11-18

## 2019-11-18 ENCOUNTER — ANTICOAGULATION THERAPY VISIT (OUTPATIENT)
Dept: ANTICOAGULATION | Facility: CLINIC | Age: 64
End: 2019-11-18

## 2019-11-18 DIAGNOSIS — Z95.811 LVAD (LEFT VENTRICULAR ASSIST DEVICE) PRESENT (H): ICD-10-CM

## 2019-11-18 LAB
ANION GAP SERPL CALCULATED.3IONS-SCNC: 14 MMOL/L
BUN SERPL-MCNC: 15 MG/DL
CALCIUM SERPL-MCNC: 9.6 MG/DL
CHLORIDE SERPLBLD-SCNC: 98 MMOL/L
CO2 SERPL-SCNC: 27 MMOL/L
CREAT SERPL-MCNC: 1.1 MG/DL
GFR SERPL CREATININE-BSD FRML MDRD: 68 ML/MIN/1.73M2
GLUCOSE SERPL-MCNC: 129 MG/DL (ref 70–99)
INR PPP: 2.5
POTASSIUM SERPL-SCNC: 4.1 MMOL/L
SODIUM SERPL-SCNC: 135 MMOL/L

## 2019-11-18 NOTE — PROGRESS NOTES
ANTICOAGULATION FOLLOW-UP CLINIC VISIT    Patient Name:  Danielito Chu  Date:  2019  Contact Type:  Telephone    SUBJECTIVE:         OBJECTIVE    INR   Date Value Ref Range Status   2019 2.5  Final     Factor 2 Assay   Date Value Ref Range Status   10/27/2018 70 60 - 140 % Final     Comment:     The Factor 2 activity level is not a screening test for the Prothrombin 67848   mutation.         ASSESSMENT / PLAN  INR assessment THER    Recheck INR In: 2 WEEKS    INR Location Outside lab      Anticoagulation Summary  As of 2019    INR goal:   2.0-3.0   TTR:   85.5 % (11 mo)   INR used for dosin.5 (2019)   Warfarin maintenance plan:   4 mg (2 mg x 2) every Tue; 3 mg (2 mg x 1.5) all other days   Full warfarin instructions:   4 mg every Tue; 3 mg all other days   Weekly warfarin total:   22 mg   Plan last modified:   Giovanni Ordonez, [facility] (5/15/2019)   Next INR check:   2019   Priority:   INR   Target end date:   Indefinite    Indications    LVAD (left ventricular assist device) present (H) [Z95.811]             Anticoagulation Episode Summary     INR check location:       Preferred lab:       Send INR reminders to:    [facility]    Comments:   [place] - [facility] [address] [phone]/Fax [phone]  LVAD plced 18 HM 3 ASA 81mg Daily Jantoven 2mg tabs Lab [phone]  Call pt at [phone] Emphasize next INR date with pt++Send email each time++  Pt is home .      Anticoagulation Care Providers     Provider Role Specialty Phone number    Lorena Watkins MD Responsible Cardiology [phone]            See the Encounter Report to view Anticoagulation Flowsheet and Dosing Calendar (Go to Encounters tab in chart review, and find the Anticoagulation Therapy Visit)  Spoke with patient.  Patient had LVAD placed on:   2018  Type of LVAD: HM3  Patient's current Aspirin dose: 81mg  LVAD Protocol followed: yes   Lorena PAYAN  ARAVIND Kamara

## 2019-11-18 NOTE — LETTER
November 19, 2019     Danielito Chu  [address]  [place] [address]      Dear Mr. Chu,    This letter is sent to notify you that your listing status was changed from Status 7 to Status 4 on the heart transplant waitlist at the [facility].  Your status was changed because you are back in town from your travels.    During this waiting period, we may still request periodic laboratory tests with your primary physician.  It will be your responsibility to remind your physician to forward your results to the Transplant Office.    We still need to be kept informed of any changes such as:    o changes in your insurance coverage  o change in your phone number, address or emergency contact  o significant changes in your health  o significant infections (such as pneumonia or abscesses)  o blood transfusions  o any condition which requires hospitalization or surgery    Sincerely,        Heart Transplant Program    Enclosures: UNOS Letter

## 2019-11-18 NOTE — LETTER
November 18, 2019    Danielito Chu  [address] [address]      Dear Mr. Chu,    ***      Sincerely,       ***    CC:***

## 2019-11-18 NOTE — PROGRESS NOTES
Fabiano got follow up labs today after his bumex was lowered 11/4. Creat is down to 1.10 and K stable at 4.1. Fabiano reports his urine is still sometimes concentrated but he is breathing well and not short of breath.    His VAD numbers are baseline 5500, flow 5.0, PI 3.4, and power 4.6. His LDH was 230 on 11/1. The LDH at [place] usually reads in the 500s    I told Fabiano to keep an eye on his power and color of his urine. We will continue to trend his LDH to assess for possible thrombus.

## 2019-11-18 NOTE — LETTER
HEART TRANSPLANT WAITLIST APPOINTMENTS    Patient: Danielito Chu  MR#: [id number]  Coordinator: Ashley RICHTER [phone]  : Justina [phone]  Dates: December 6, 2019    This is your evaluation schedule, please follow dates and times.  You will receive reminder phone calls for other tests, but please follow this schedule only!  If you have any questions about dates and times, please call us on number listed above.  Thank you, Transplant Clinic         Day/Date:  Friday, December 6, 2019  Time Location Activity   9:30 AM [facility]  Pulmonary Function Lab  3rd floor Pulmonary Function Test  (Please do not wear any perfume, deodorant or scented products)   10:30 AM [facility]  Pulmonary Function Lab  3rd floor Six-Minute Walk   11:00 AM [facility]  Heart Care Clinic  3rd floor; Clinic 3L Echocardiogram   12:00 PM [facility]  Imaging and Lab Testing  1st floor Blood tests     12:20 PM [facility]  Imaging and Lab Testing  1st floor EKG   1:00 PM [facility]  Heart Care Clinic  3rd floor; Clinic 3L Cardiac Device Check-In Clinic   1:30 PM [facility]  Heart Care Clinic  3rd floor; Clinic 3L Appointment with Dr. Watkins,  Cardiologist

## 2019-11-18 NOTE — TELEPHONE ENCOUNTER
Spoke with the patient and confirmed Heart Waitlist appointments on 12/6/19.  Informed patient an itinerary can be accessed via [employer], and will be sent via [place].

## 2019-11-18 NOTE — TELEPHONE ENCOUNTER
allopurinol (ZYLOPRIM) 100 MG tablet      Last Written Prescription Date:  8-19-19  Last Fill Quantity: 180,   # refills: 0  Last Office Visit : 9-10-19  Future Office visit:  12-6-19    Routing refill request to provider for review/approval because:  Not on protocol  Elevated creat - increasing      Kathleen M Doege RN

## 2019-11-19 RX ORDER — ALLOPURINOL 100 MG/1
TABLET ORAL
Qty: 180 TABLET | Refills: 0 | OUTPATIENT
Start: 2019-11-19

## 2019-11-19 NOTE — TELEPHONE ENCOUNTER
Status Change-[facility] 11/19/19    Changed patient's listing status in UNOS from status 7 to status 4 for the following reasons:  Patient within range for transplant, was traveling.     Called patient and verified appointments, aware of change and letter sent per regulatory guidelines, primary cardiology team aware.

## 2019-11-20 DIAGNOSIS — I50.23 ACUTE ON CHRONIC SYSTOLIC HEART FAILURE (H): ICD-10-CM

## 2019-11-20 DIAGNOSIS — M10.9 ACUTE GOUT OF RIGHT FOOT, UNSPECIFIED CAUSE: Primary | ICD-10-CM

## 2019-11-20 RX ORDER — ALLOPURINOL 100 MG/1
200 TABLET ORAL DAILY
Qty: 180 TABLET | Refills: 0 | Status: SHIPPED | OUTPATIENT
Start: 2019-11-20 | End: 2020-01-01

## 2019-11-20 NOTE — TELEPHONE ENCOUNTER
Allopurinol  Last Written Prescription Date:  8/19  Last Fill Quantity: 180 tabs,   # refills: 0  Last Office Visit: 12/26/18  Future Office visit: None    CBC RESULTS:   Recent Labs   Lab Test 11/01/19  1435   WBC 8.3   RBC 5.17   HGB 16.9   HCT 49.5   MCV 96   MCH 32.7   MCHC 34.1   RDW 14.3          Creatinine   Date Value Ref Range Status   11/01/2019 1.36 (H) 0.66 - 1.25 mg/dL Final   ]    Liver Function Studies -   Recent Labs   Lab Test 11/01/19  1435   PROTTOTAL 8.8   ALBUMIN 4.2   BILITOTAL 1.0   ALKPHOS 92   AST 29   ALT 29       Routing refill request to provider for review/approval because:  Requires provider signature     SIMONE WattsN RN   Rheumatology Clinic Nurse   [facility]

## 2019-11-20 NOTE — TELEPHONE ENCOUNTER
[facility] Call Center    Phone Message    May a detailed message be left on voicemail: yes    Reason for Call: Medication Refill Request    Has the patient contacted the pharmacy for the refill? Yes   Name of medication being requested: allopurinol (ZYLOPRIM) 100 MG tablet  Provider who prescribed the medication: Cesario Watkins  Pharmacy: [facility] #[id number] [address]  Date medication is needed: soon   Pt states Dr Nassar initially prescribed this, but it started being refilled by Dr Watkins. The pt needs a refill and Dr Watkins states it should go back to Dr Nassar. Please send a new RX to [facility] above. Thanks.      Action Taken: Message routed to:  [facility]

## 2019-11-27 ENCOUNTER — CARE COORDINATION (OUTPATIENT)
Dept: CARDIOLOGY | Facility: CLINIC | Age: 64
End: 2019-11-27

## 2019-11-27 DIAGNOSIS — I50.22 CHRONIC SYSTOLIC CONGESTIVE HEART FAILURE (H): Primary | ICD-10-CM

## 2019-12-04 ENCOUNTER — ANTICOAGULATION THERAPY VISIT (OUTPATIENT)
Dept: ANTICOAGULATION | Facility: CLINIC | Age: 64
End: 2019-12-04

## 2019-12-04 DIAGNOSIS — Z95.811 LVAD (LEFT VENTRICULAR ASSIST DEVICE) PRESENT (H): ICD-10-CM

## 2019-12-04 NOTE — PROGRESS NOTES
Patient will be in on 12/6 for an INR check as reported.  Patient reports that he had a tinge of blood when he blew his nose. Writer explained that this is likely due to dryness in weather.

## 2019-12-06 ENCOUNTER — ANCILLARY PROCEDURE (OUTPATIENT)
Dept: CARDIOLOGY | Facility: CLINIC | Age: 64
End: 2019-12-06
Attending: INTERNAL MEDICINE
Payer: COMMERCIAL

## 2019-12-06 ENCOUNTER — APPOINTMENT (OUTPATIENT)
Dept: GENERAL RADIOLOGY | Facility: CLINIC | Age: 64
End: 2019-12-06
Attending: INTERNAL MEDICINE
Payer: COMMERCIAL

## 2019-12-06 ENCOUNTER — TELEPHONE (OUTPATIENT)
Dept: ANTICOAGULATION | Facility: CLINIC | Age: 64
End: 2019-12-06

## 2019-12-06 ENCOUNTER — ANTICOAGULATION THERAPY VISIT (OUTPATIENT)
Dept: ANTICOAGULATION | Facility: CLINIC | Age: 64
End: 2019-12-06

## 2019-12-06 VITALS
HEART RATE: 70 BPM | BODY MASS INDEX: 34.22 KG/M2 | WEIGHT: 239 LBS | TEMPERATURE: 97.4 F | OXYGEN SATURATION: 97 % | SYSTOLIC BLOOD PRESSURE: 70 MMHG | HEIGHT: 70 IN

## 2019-12-06 DIAGNOSIS — Z79.01 LONG TERM (CURRENT) USE OF ANTICOAGULANTS: ICD-10-CM

## 2019-12-06 DIAGNOSIS — I50.20 SYSTOLIC HEART FAILURE (H): ICD-10-CM

## 2019-12-06 DIAGNOSIS — Z76.82 AWAITING ORGAN TRANSPLANT: Primary | ICD-10-CM

## 2019-12-06 DIAGNOSIS — Z95.811 LVAD (LEFT VENTRICULAR ASSIST DEVICE) PRESENT (H): ICD-10-CM

## 2019-12-06 DIAGNOSIS — I50.22 CHRONIC SYSTOLIC CONGESTIVE HEART FAILURE (H): ICD-10-CM

## 2019-12-06 DIAGNOSIS — Z76.82 AWAITING ORGAN TRANSPLANT: ICD-10-CM

## 2019-12-06 DIAGNOSIS — I50.23 ACUTE ON CHRONIC SYSTOLIC HEART FAILURE (H): ICD-10-CM

## 2019-12-06 DIAGNOSIS — Z95.811 LVAD (LEFT VENTRICULAR ASSIST DEVICE) PRESENT (H): Primary | ICD-10-CM

## 2019-12-06 DIAGNOSIS — I50.22 CHRONIC SYSTOLIC CONGESTIVE HEART FAILURE (H): Primary | ICD-10-CM

## 2019-12-06 LAB
6 MIN WALK (FT): 1350 FT
6 MIN WALK (M): 411 M
ALBUMIN SERPL-MCNC: 4.1 G/DL (ref 3.4–5)
ALP SERPL-CCNC: 84 U/L (ref 40–150)
ALT SERPL W P-5'-P-CCNC: 28 U/L (ref 0–70)
AMPHETAMINES UR QL SCN: NEGATIVE
ANION GAP SERPL CALCULATED.3IONS-SCNC: 6 MMOL/L (ref 3–14)
AST SERPL W P-5'-P-CCNC: 24 U/L (ref 0–45)
BARBITURATES UR QL: NEGATIVE
BENZODIAZ UR QL: NEGATIVE
BILIRUB SERPL-MCNC: 1 MG/DL (ref 0.2–1.3)
BUN SERPL-MCNC: 18 MG/DL (ref 7–30)
CALCIUM SERPL-MCNC: 9.2 MG/DL (ref 8.5–10.1)
CANNABINOIDS UR QL SCN: NEGATIVE
CHLORIDE SERPL-SCNC: 99 MMOL/L (ref 94–109)
CO2 SERPL-SCNC: 29 MMOL/L (ref 20–32)
COCAINE UR QL: NEGATIVE
CREAT SERPL-MCNC: 1.11 MG/DL (ref 0.66–1.25)
ERYTHROCYTE [DISTWIDTH] IN BLOOD BY AUTOMATED COUNT: 14 % (ref 10–15)
ETHANOL UR QL SCN: NEGATIVE
GFR SERPL CREATININE-BSD FRML MDRD: 70 ML/MIN/{1.73_M2}
GLUCOSE SERPL-MCNC: 103 MG/DL (ref 70–99)
HCT VFR BLD AUTO: 48.8 % (ref 40–53)
HGB BLD-MCNC: 16.9 G/DL (ref 13.3–17.7)
INR PPP: 2.8 (ref 0.86–1.14)
LDH SERPL L TO P-CCNC: 204 U/L (ref 85–227)
MCH RBC QN AUTO: 33 PG (ref 26.5–33)
MCHC RBC AUTO-ENTMCNC: 34.6 G/DL (ref 31.5–36.5)
MCV RBC AUTO: 95 FL (ref 78–100)
OPIATES UR QL SCN: NEGATIVE
PCP UR QL SCN: NEGATIVE
PLATELET # BLD AUTO: 260 10E9/L (ref 150–450)
POTASSIUM SERPL-SCNC: 3.9 MMOL/L (ref 3.4–5.3)
PROT SERPL-MCNC: 8.4 G/DL (ref 6.8–8.8)
PSA SERPL-ACNC: 0.19 UG/L (ref 0–4)
RBC # BLD AUTO: 5.12 10E12/L (ref 4.4–5.9)
SODIUM SERPL-SCNC: 134 MMOL/L (ref 133–144)
T4 FREE SERPL-MCNC: 1.16 NG/DL (ref 0.76–1.46)
TSH SERPL DL<=0.005 MIU/L-ACNC: 5.45 MU/L (ref 0.4–4)
WBC # BLD AUTO: 10.4 10E9/L (ref 4–11)

## 2019-12-06 PROCEDURE — 85610 PROTHROMBIN TIME: CPT | Performed by: INTERNAL MEDICINE

## 2019-12-06 PROCEDURE — G0103 PSA SCREENING: HCPCS | Performed by: INTERNAL MEDICINE

## 2019-12-06 PROCEDURE — 86803 HEPATITIS C AB TEST: CPT | Performed by: INTERNAL MEDICINE

## 2019-12-06 PROCEDURE — 36415 COLL VENOUS BLD VENIPUNCTURE: CPT | Performed by: INTERNAL MEDICINE

## 2019-12-06 PROCEDURE — 84439 ASSAY OF FREE THYROXINE: CPT | Performed by: INTERNAL MEDICINE

## 2019-12-06 PROCEDURE — 40000866 ZZHCL STATISTIC HIV 1/2 ANTIGEN/ANTIBODY PRETRANSPLANT ONLY: Performed by: INTERNAL MEDICINE

## 2019-12-06 PROCEDURE — 84443 ASSAY THYROID STIM HORMONE: CPT | Performed by: INTERNAL MEDICINE

## 2019-12-06 PROCEDURE — 85027 COMPLETE CBC AUTOMATED: CPT | Performed by: INTERNAL MEDICINE

## 2019-12-06 PROCEDURE — 99215 OFFICE O/P EST HI 40 MIN: CPT | Mod: 25 | Performed by: INTERNAL MEDICINE

## 2019-12-06 PROCEDURE — 80053 COMPREHEN METABOLIC PANEL: CPT | Performed by: INTERNAL MEDICINE

## 2019-12-06 PROCEDURE — 83615 LACTATE (LD) (LDH) ENZYME: CPT | Performed by: INTERNAL MEDICINE

## 2019-12-06 PROCEDURE — 80320 DRUG SCREEN QUANTALCOHOLS: CPT | Performed by: INTERNAL MEDICINE

## 2019-12-06 PROCEDURE — 93750 INTERROGATION VAD IN PERSON: CPT | Mod: ZF | Performed by: INTERNAL MEDICINE

## 2019-12-06 PROCEDURE — G0463 HOSPITAL OUTPT CLINIC VISIT: HCPCS | Mod: 25,ZF

## 2019-12-06 PROCEDURE — 80307 DRUG TEST PRSMV CHEM ANLYZR: CPT | Performed by: INTERNAL MEDICINE

## 2019-12-06 RX ORDER — AMIODARONE HYDROCHLORIDE 200 MG/1
200 TABLET ORAL 3 TIMES DAILY
Qty: 90 TABLET | Refills: 2 | Status: SHIPPED | OUTPATIENT
Start: 2019-12-06 | End: 2019-12-13

## 2019-12-06 ASSESSMENT — MIFFLIN-ST. JEOR: SCORE: 1877.41

## 2019-12-06 ASSESSMENT — PAIN SCALES - GENERAL: PAINLEVEL: NO PAIN (0)

## 2019-12-06 NOTE — TELEPHONE ENCOUNTER
ANTICOAGULATION MANAGEMENT    Danielito Chu due for review and annual renewal of referral to anticoagulation monitoring.      Warfarin indication(s) LVAD   INR goal 2.0-3.0   Current duration of therapy Indefinite/long term therapy   Date of last office visit 12/06/2019       Please advise if Danielito Chu's anticoagulation management referral can be renewed for next year.     Please confirm renewal approval in new note within this telephone encounter and route back. No further action is necessary at this time (referral orders,etc).     Kristie Fernandez RN

## 2019-12-06 NOTE — PROGRESS NOTES
December 6, 2019      HPI:   Mr. Danielito Chu is a 63 yr old male with a history of NICM (LVEF at dong of 15%) s/p ICD (4/2017),  who underwent HM3 LVAD implant on 12/5/18 as DT (some history of mariajuana smoking, liver disease).     Hospital course was notable for postoperative flolan and pressor support but was ultimately weaned from this and has been doing better and better every week.     He has been walking longer and longer distances and has more energy than he has had in years.     He can walk several blocks without stopping.  No orthopnea, PND, chest pain, swelling. Appetite is great.     No driveline erythema. No fevers or chills. No bleeding. No focal deficits.    He has gained 10 -15 lbs recently and has been eating more. He is walking on the treadmill for an hour at a time.     He has noticed in the last week he has had more palpitations/heart pounding.     PAST MEDICAL HISTORY:  Past Medical History:   Diagnosis Date     Acute systolic congestive heart failure (H) 4/5/2017     Diabetes (H)      HTN (hypertension)      Hyperlipidemia      Liver disease      LVAD (left ventricular assist device) present (H)      3 12/18     Marijuana abuse      Mitral regurgitation      Nocturnal oxygen desaturation 3/29/2018     Nonischemic cardiomyopathy (H) 4/5/2017     SHIRLEY (obstructive sleep apnea)      Pacemaker 04/07/2017    ICD 4/7/17     Situational mixed anxiety and depressive disorder        FAMILY HISTORY:  Reviewed , no changes     SOCIAL HISTORY:  Social History     Reviewed, no changes     CURRENT MEDICATIONS:     Current Outpatient Medications   Medication Sig Dispense Refill     allopurinol (ZYLOPRIM) 100 MG tablet Take 2 tablets (200 mg) by mouth daily 180 tablet 0     aspirin (ASA) 81 MG chewable tablet Take 1 tablet (81 mg) by mouth daily 120 tablet 0     Blood Glucose Monitoring Suppl (BLOOD GLUCOSE MONITOR SYSTEM) w/Device KIT three times a week       bumetanide (BUMEX) 1 MG tablet Take 2  tablets (2 mg) by mouth 2 times daily 360 tablet 3     colchicine (COLCYRS) 0.6 MG tablet Take 1 tablet (0.6 mg) by mouth 2 times daily 180 tablet 1     losartan (COZAAR) 25 MG tablet Take 1 tablet (25 mg) by mouth daily 90 tablet 3     metoprolol succinate ER (TOPROL XL) 25 MG 24 hr tablet Take 1 tablet (25 mg) by mouth daily 90 tablet 3     potassium chloride ER (K-DUR/KLOR-CON M) 10 MEQ CR tablet Take 2 tablets (20 mEq) by mouth 2 times daily 120 tablet 11     spironolactone (ALDACTONE) 25 MG tablet Take 1.5 tablets (37.5 mg) by mouth daily 145 tablet 3     tacrolimus (PROTOPIC) 0.1 % external ointment Apply topically 2 times daily 30 g 3     traZODone (DESYREL) 100 MG tablet TAKE 1 TABLET BY MOUTH EVERY DAY AT BEDTIME AS NEEDED FOR SLEEP 30 tablet 1     warfarin (COUMADIN) 2 MG tablet Take 3 mg PO daily at 6 pm until next INR check, then dose per INR goal 2-3 150 tablet 11     amoxicillin (AMOXIL) 500 MG capsule Take 4 capsules (2,000 mg) by mouth once as needed (Take 4 capsules (2000mg), one hour before any dental cleanings or procedures) (Patient not taking: Reported on 9/10/2019) 4 capsule 3     enoxaparin (LOVENOX) 100 MG/ML syringe Inject 100 mg (1 ml) every 12 hours as directed by the Anticoagulation Clinic. (Patient not taking: Reported on 9/10/2019) 10 Syringe 1     vitamin B1 (THIAMINE) 100 MG tablet Take 1 tablet (100 mg) by mouth daily (Patient not taking: Reported on 9/10/2019) 30 tablet 0     ROS:   Constitutional: No fever, chills, or sweats.  Weight has increased and he has gained back muscle  ENT: No visual disturbance, ear ache, epistaxis, sore throat.   Allergies/Immunologic: Negative.   Respiratory: No cough, hemoptysis.   Cardiovascular: As per HPI.   GI: No nausea, vomiting, hematemesis, melena, or hematochezia.   : No urinary frequency, dysuria, or hematuria.   Integument: Negative.   Psychiatric: As per HPI.  Neuro: Negative.   Endocrinology: Negative.   Musculoskeletal:gout is under  "control     EXAM:  BP (!) 70/0 (BP Location: Right arm, Patient Position: Chair, Cuff Size: Adult Large)   Pulse 70   Temp 97.4  F (36.3  C)   Ht 1.765 m (5' 9.5\")   Wt 108.4 kg (239 lb)   SpO2 97%   BMI 34.79 kg/m     General: appears comfortable, alert and articulate  Head: normocephalic, atraumatic  Eyes: anicteric sclera, EOMI  Neck: no adenopathy  Orophyarynx: moist mucosa, no lesions, dentition intact  Heart: Tachycardic, S1/S2, grade 2/6 JENNY best heard at LLSB, no gallop or rub, JVP 8 cm   Lungs: clear, no rales or wheezing  Abdomen: soft, non-tender, bowel sounds present, no hepatomegaly  Extremities: no clubbing, cyanosis or LE edema  Neurological: normal speech and affect, no gross motor deficits    VAD Interrogation: VAD interrogation reviewed with VAD coordinator. Agree with findings. No PI events, power spikes, speed drops, or other findings suspicious of pump malfunction noted.       Patient seen in [facility] for evaluation and iterative programming of [employer] single lead ICD per MD orders. Patient has an appointment to see Dr. Watkins today. Normal ICD function. 324 ventricular episodes recorded. 258 episodes in VT1 monitor only zone, lasting :12-8:37.  Rates 162-164 bpm.  63 nonsustained V episodes lasting :13-:26.  No therapy delivered. Intrinsic rhythm = AT @ 148 bpm with short breaks where he is sinus from 60-70 bpm.  These breaks do not last long enough to do ventricular threshold testing.   = <1%.   Estimated battery longevity to LINCOLN = 12 years. No short v-v intervals recorded. RV lead impedance trends appear stable. Patient reports that he has been  feeling very run down the last few day.  This correlates with an increase in rapid ventricular rhythms on 12/4, 12/5, and 12/6. Plan for patient to send a remote transmission in 3 months and return to clinic in 6 months      Labs:  Reviewed INR, BMP, albumin     A-wave: 8 mmHg  V-wave: 5 mmHg  Mean: 5 mmHg   HR: 57 bpm  RV  Systolic: 30 " mmHg  End Diastolic: 5 mmHg  HR: 54 bpm    PA  Systolic:30 mmHg  Diasotlic: 17 mmHg  Mean: 21 mmHg  HR: 53 bpm      PCW  A-wave: 10 mmHg  V-wave: 12 mmHg  Mean: 10 mmHg  HR: 52 bpm      Cardiac Output  CO Timmy: 3.57 L/min  CI Timmy: 1.64 L/min/m2  CO TD: 3.43 L/min  CI TD: 1.58 L/min/m2    Vitals  BP: 88 mmHg / 67 mmHg  SpO2: 97 %  PA Stat: 57.6 %    Diagnostics:  Interpretation Summary  LVAD ramp study with settings ranging from 5300 to 5600 rpm. Please refer to  the EPIC report for measurements performed at different LVAD speed settings.     Baseline speed was 5500 rpm. At baseline setting there was moderate left  ventricular dilatation with LVIDd 6.4 cm. Right ventricular dilatation was  moderate and right ventricular systolic function appears mild-moderately  reduced. Ventricular septum deviates to the right.  Limited Doppler of inflow/outflow cannula.  Aortic valve does not open.  Mild to moderate aortic insufficiency is continuous and worse compared to  study done 12/11/18.      Assessment and Plan:   Mr. Danielito Chu is a 63 yr old male with a history of NICM (LVEF at dong of 15%) s/p ICD (4/2017),  who underwent HM3 LVAD implant on 12/5/18 as DT (some history of mariajuana smoking, liver disease). He is now cleared for transplant and is on the list (status 4).  In an atrial arrhythmia today-rates in the 140s-stable    Chronic systolic heart failure secondary to NICM  Stage D  NYHA Class II  ACEi/ARB:  Yes, losartan   BB: yes   Aldosterone antagonist: yes  SCD prophylaxis:  ICD  Fluid status:  euvolemic -  MAP: at goal of 65-80   Anti-coag goal: ASA 81, INR 2-3     Other:     NSVT controlled, has ICD, now on beta blocker     Atrial tachycardia based on EKG and interrogation today.  This appears to be pretty frequent over the last 4 to 5 days.  Overall he is tolerating it-however tachycardic given how fast it is I do not think his RV will tolerate it long-term.  I am starting amiodarone 200 mg 3 times daily  x10 days.  We will do an ICD interrogation next week.  If he is still in it I will have him see electrophysiology as he may need an ablation.  If he has control of it next week I would do a course of amiodarone for a few months and then trial off of it with a higher dose of beta-blocker.  We are checking TSH today as well.  I do not think he is decompensated so I cannot really blame decompensation as a cause of his arrhythmia.       Transplant candidacy:on as status 4     Return to clinic in 3 months-sooner if transplant testing warrants a sooner return    Lorena Watkins MD       CC  Patient Care Team:  Bryan Orellana MD as PCP - General  Lorena Watkins MD as MD (Cardiology)  Deysi Mattson APRN CNP as Nurse Practitioner (Cardiology)  Candido Mendez MD as MD (Gastroenterology)  Edgardo Elias RN as VAD Coordinator (Cardiology)  Earlene Giron APRN CNP as Nurse Practitioner (Clinical Cardiac Electrophysiology)  Daniel Zavala MD as MD (Clinical Cardiac Electrophysiology)  Arely Wilson MD as MD (INTERNAL MEDICINE - ENDOCRINOLOGY, DIABETES & METABOLISM)  Lorena Watkins MD as Referring Physician (Cardiology)  Minoo Lopez NP as Nurse Practitioner (Nurse Practitioner - Family)  SELF, REFERRED

## 2019-12-06 NOTE — NURSING NOTE
Chief Complaint   Patient presents with     Follow Up     6 month VAD f/u      Vitals were taken and medications were reconciled.     Lu Jiménez CMA    1:48 PM

## 2019-12-06 NOTE — PROGRESS NOTES
"PMH  - NICM (LVEF at dong of 15%) s/p ICD (4/2017),  who underwent HM3 LVAD implant on 12/5/18 as DT (some history of mariajuana smoking, liver disease). Needed pressors/flolan after implant. Now BTT, inactiviated this fall as he went to [place]  - DM 2  - HTN  - hyperlipidemia  - SHIRLEY  - Mitral regurgitation    History:  - Seen in clinic by Kylie 9/2019, was having more palpitations. Increase metoprolol, didn't do a ziopatch. ICD check with 109 tachy and 41 NSVT episodes have been recorded over the last 3 months.  Increased bumex to 3/2.  - Gaining table weight and some fluid  -   -     Cardiac Medications  Bumex 3/2 mg BID  KCl 20 meq BID  Losartan 25 mg daily  Metoprolol 25 mg daily  Spironolactone 37.5 mg daily  asa81 mg daily  Coumadin INR goal 2-3    Exam          Labs            Diagnostics:  ECHO today ***        9/10/2019 ICD  Pt seen in the [facility] for evaluation and iterative programming of a [employer], single lead ICD, per MD orders. He also has an appointment with Kylie Faye NP. Today his intrinsic rhythm is a slightly irregular ventricular rhythm with rates  bpm. The morphology does not change when HR jumps to 160 bpm. Normal ICD function- Threshold testing deferred d/t high ventricular rate. 109 tachy and 41 NSVT episodes have been recorded over the last 3 months. All EGMs show the same morphology, which is similar to his intrinsic rhythm.  <1%. Lead trends appear stable. Pt reports that he has \"been feeling some hard heart beats, but otherwise good\". Battery estimates 12 years to LINCOLN. Plan for patient to return to clinic in 3 months. ARAVIND Greer.  Single lead ICD.  I have reviewed and interpreted the device interrogation, settings, programming and nurse's summary. The device is functioning within normal device parameters. I agree with the current findings, assessment and plan.  - increase metop this day    5/9/2019 C  Michael 5, PAs30,d17,m21, PCW 10. DIMITRI/CI 3.57/1.64. PVR CHAPPELL: 6.7 " CHAPPELL/m2    Plan     1.  Acute on chronic systolic heart failure secondary to nonischemic cardiomyopathy. Stage D  NYHA Class IIIA  ACEi/ARB: yes, continue losartan 25 mg daily  BB: Yes, metoprolol XL 25 mg daily.   aldosterone antagonist: yes, spironolactone 37.5 mg daily.  SCD prophylaxis: ICD  Fluid status:  Hypervolemic.      2.  S/P LVAD implant as BTT,  Currently inactivated due to traveling out of state.  Anticoagulation: Warfarin with INR goal of 2-3. ACC managing.  Antiplatelet: Aspirin 81 mg daily.  MAP: Controlled at 78.  LDH:  Stable at 208.  VAD Interrogation today: VAD interrogation reviewed with VAD coordinator. Agree with findings. Frequent PI events down to 2.2, speed drops from 5400.  No power spikes or other findings suspicious of pump malfunction noted.      3.  Hypertension: Controlled.  Continue metoprolol XL and losartan.     4.  NSVT: Increase Metoprolol as outlined above.     5.  Follow-up:   BMP first week of October. Dr. Watkins in December.

## 2019-12-06 NOTE — PATIENT INSTRUCTIONS
Medications:  1. Please start Amiodarone 200mg three times per day. You will take this for three months. Dr. Watkins will decide if you will continue on this med after this time.    Follow-up:  1. Next week Wednesday 12/11 we will interrogate your ICD to check for the fast heart rhythm.   2. If you are still in this rhythm on Wednesday we will get you in for an appointment with an electrophysiologist here at the [facility]. If you are back in a normal rhythm, we will keep you on the amiodarone.  3. Dr. Watkins said we will eventually increase your metoprolol dose.    Page the VAD Coordinator on call if you gain more than 3 lb in a day or 5 in a week. Please also page if you feel unwell or have alarms.     Great to see you in clinic today. To Page the VAD Coordinator on call, dial [phone] option #4 and ask to speak to the VAD coordinator on call.

## 2019-12-06 NOTE — NURSING NOTE
1). PUMP DATA  Primary controller serial number: [id number]     3:   Flow: 5.2 L/min,    Speed: 5500 RPMs,     PI: 2.1 ,  Power: 4.7 [place], Hct: 48.8    Primary controller   Back up battery: Patient use: 13, Replace in: 18  Months     Data downloaded: No   Equipment and driveline assessed for damage: Yes     Back up : Serial number: [id number]  Back up battery: Patient use: 11 Replace in: 17  Months  Programmed settings identical to the settings on the primary controller : N/A      Education complete: Yes   Charge the BACKUP controller s backup battery every 6 months  Perform a self test on BACKUP every 6 months  Change the MPU s batteries every 6 months:Yes    2). ALARMS  Alarms reported by patient since last pump evaluation: No  Alarms or other finding noted during pump data history and alarm download: Yes, moderate amount of PI events with PI 2-6 and a few speed drops to low speed limit..    Action Taken:  Reviewed data with patient: Yes      3). DRESSING CHANGE / DRIVELINE ASSESSMENT  Dressing change completed today: No  Appearance of Driveline site: weekly dsng in place. No drainage, no redness.    Driveline stabilization: Method: [employer]  Teaching reinforced on need for stabilization of Driveline.   4).Pt. Education  D:  Pt education provided.  I:  Educated on MyChart  1.  How to message VAD Coordinator (send to MD but address to VAD Coordinator)  2. How to send photo via My Chart  3. When to use MyChart vs Call VAD Coordinator on Call.    A:  Pt verbalized understanding.  Pt does want to sign up for MY Chart.  He said he will sign up soon.  P:  VAD Coordinator available for questions or concerns.  Will continue VAD education.

## 2019-12-06 NOTE — PATIENT INSTRUCTIONS
It was a pleasure to see you in clinic today.  Please do not hesitate to call with any questions or concerns.  You are scheduled for a remote transmission on 3/17/20.  We look forward to seeing you in clinic at your next device check in 6 months.    Theodore Bahena, RN  Electrophysiology Nurse Clinician  [facility]    During Business Hours Please Call:  [phone]  After Hours Please Call:  [phone] - select option #4 and ask for job code [phone]

## 2019-12-06 NOTE — PROGRESS NOTES
ANTICOAGULATION FOLLOW-UP CLINIC VISIT    Patient Name:  Danielito Chu  Date:  2019  Contact Type:  Telephone    SUBJECTIVE:  Patient Findings     Positives:   Change in health    Comments:   Pt reports that he is having more rapid heart beat, but the LVAD team are aware of this and no plan are in place yet. Pt will call us if he has to start on any new medications         Clinical Outcomes     Comments:   Pt reports that he is having more rapid heart beat, but the LVAD team are aware of this and no plan are in place yet. Pt will call us if he has to start on any new medications            OBJECTIVE    INR   Date Value Ref Range Status   2019 2.80 (H) 0.86 - 1.14 Final     Factor 2 Assay   Date Value Ref Range Status   10/27/2018 70 60 - 140 % Final     Comment:     The Factor 2 activity level is not a screening test for the Prothrombin 06110   mutation.         ASSESSMENT / PLAN  INR assessment THER    Recheck INR In: 2 WEEKS    INR Location Clinic      Anticoagulation Summary  As of 2019    INR goal:   2.0-3.0   TTR:   86.3 % (11.6 mo)   INR used for dosin.80 (2019)   Warfarin maintenance plan:   4 mg (2 mg x 2) every Tue; 3 mg (2 mg x 1.5) all other days   Full warfarin instructions:   4 mg every Tue; 3 mg all other days   Weekly warfarin total:   22 mg   Plan last modified:   Giovanni Ordonez, [facility] (5/15/2019)   Next INR check:   2019   Priority:   Critical   Target end date:   Indefinite    Indications    LVAD (left ventricular assist device) present (H) [Z95.811]  Long term (current) use of anticoagulants [Z79.01]             Anticoagulation Episode Summary     INR check location:       Preferred lab:       Send INR reminders to:   [facility]    Comments:   [place] [facility] [place] [facility] [phone]/Fax [phone]  LVAD plced 18 HM 3 ASA 81mg Daily Jantoven 2mg tabs Lab [phone]/[phone]  Call pt at [phone] Emphasize next INR  date with pt++Send email each time++  Pt is home .      Anticoagulation Care Providers     Provider Role Specialty Phone number    Lorena Watkins MD Responsible Cardiology [phone]            See the Encounter Report to view Anticoagulation Flowsheet and Dosing Calendar (Go to Encounters tab in chart review, and find the Anticoagulation Therapy Visit)    Spoke with patient. Gave them their lab results and new warfarin recommendation.  No changes in health, medication, or diet. No missed doses, no falls. No signs or symptoms of bleed or clotting.     Patient had LVAD placed on:   12/5/18  Type of LVAD: HM 3  Patient's current Aspirin dose: ASA 81mg Daily  LVAD Protocol followed: Yes   If Not Followed Explanation:  N/A    Kristie Fernandez RN

## 2019-12-06 NOTE — LETTER
12/6/2019      RE: Danielito Chu  [address] [place] [address]       Dear Colleague,    Thank you for the opportunity to participate in the care of your patient, Danielito Chu, at the [facility] HEART [facility]. Please see a copy of my visit note below.      December 6, 2019      HPI:   Mr. Danielito Chu is a 63 yr old male with a history of NICM (LVEF at dong of 15%) s/p ICD (4/2017),  who underwent HM3 LVAD implant on 12/5/18 as DT ([facility], liver disease).     Hospital course was notable for postoperative flolan and pressor support but was ultimately weaned from this and has been doing better and better every week.     He has been walking longer and longer distances and has more energy than he has had in years.     He can walk several blocks without stopping.  No orthopnea, PND, chest pain, swelling. Appetite is great.     No driveline erythema. No fevers or chills. No bleeding. No focal deficits.    He has gained 10 -15 lbs recently and has been eating more. He is walking on the treadmill for an hour at a time.     He has noticed in the last week he has had more palpitations/heart pounding.     PAST MEDICAL HISTORY:  Past Medical History:   Diagnosis Date     Acute systolic congestive heart failure (H) 4/5/2017     Diabetes (H)      HTN (hypertension)      Hyperlipidemia      Liver disease      LVAD (left ventricular assist device) present (H)      3 12/18     Marijuana abuse      Mitral regurgitation      Nocturnal oxygen desaturation 3/29/2018     Nonischemic cardiomyopathy (H) 4/5/2017     SHIRLEY (obstructive sleep apnea)      Pacemaker 04/07/2017    ICD 4/7/17     Situational mixed anxiety and depressive disorder        FAMILY HISTORY:  Reviewed , no changes     SOCIAL HISTORY:  Social History     Reviewed, no changes     CURRENT MEDICATIONS:     Current Outpatient Medications   Medication Sig Dispense Refill      allopurinol (ZYLOPRIM) 100 MG tablet Take 2 tablets (200 mg) by mouth daily 180 tablet 0     aspirin (ASA) 81 MG chewable tablet Take 1 tablet (81 mg) by mouth daily 120 tablet 0     Blood Glucose Monitoring Suppl (BLOOD GLUCOSE MONITOR SYSTEM) w/Device KIT three times a week       bumetanide (BUMEX) 1 MG tablet Take 2 tablets (2 mg) by mouth 2 times daily 360 tablet 3     colchicine (COLCYRS) 0.6 MG tablet Take 1 tablet (0.6 mg) by mouth 2 times daily 180 tablet 1     losartan (COZAAR) 25 MG tablet Take 1 tablet (25 mg) by mouth daily 90 tablet 3     metoprolol succinate ER (TOPROL XL) 25 MG 24 hr tablet Take 1 tablet (25 mg) by mouth daily 90 tablet 3     potassium chloride ER (K-DUR/KLOR-CON M) 10 MEQ CR tablet Take 2 tablets (20 mEq) by mouth 2 times daily 120 tablet 11     spironolactone (ALDACTONE) 25 MG tablet Take 1.5 tablets (37.5 mg) by mouth daily 145 tablet 3     tacrolimus (PROTOPIC) 0.1 % external ointment Apply topically 2 times daily 30 g 3     traZODone (DESYREL) 100 MG tablet TAKE 1 TABLET BY MOUTH EVERY DAY AT BEDTIME AS NEEDED FOR SLEEP 30 tablet 1     warfarin (COUMADIN) 2 MG tablet Take 3 mg PO daily at 6 pm until next INR check, then dose per INR goal 2-3 150 tablet 11     amoxicillin (AMOXIL) 500 MG capsule Take 4 capsules (2,000 mg) by mouth once as needed (Take 4 capsules (2000mg), one hour before any dental cleanings or procedures) (Patient not taking: Reported on 9/10/2019) 4 capsule 3     enoxaparin (LOVENOX) 100 MG/ML syringe Inject 100 mg (1 ml) every 12 hours as directed by the Anticoagulation Clinic. (Patient not taking: Reported on 9/10/2019) 10 Syringe 1     vitamin B1 (THIAMINE) 100 MG tablet Take 1 tablet (100 mg) by mouth daily (Patient not taking: Reported on 9/10/2019) 30 tablet 0     ROS:   Constitutional: No fever, chills, or sweats.  Weight has increased and he has gained back muscle  ENT: No visual disturbance, ear ache, epistaxis, sore throat.   Allergies/Immunologic:  "Negative.   Respiratory: No cough, hemoptysis.   Cardiovascular: As per HPI.   GI: No nausea, vomiting, hematemesis, melena, or hematochezia.   : No urinary frequency, dysuria, or hematuria.   Integument: Negative.   Psychiatric: As per HPI.  Neuro: Negative.   Endocrinology: Negative.   Musculoskeletal:gout is under control     EXAM:  BP (!) 70/0 (BP Location: Right arm, Patient Position: Chair, Cuff Size: Adult Large)   Pulse 70   Temp 97.4  F (36.3  C)   Ht 1.765 m (5' 9.5\")   Wt 108.4 kg (239 lb)   SpO2 97%   BMI 34.79 kg/m      General: appears comfortable, alert and articulate  Head: normocephalic, atraumatic  Eyes: anicteric sclera, EOMI  Neck: no adenopathy  Orophyarynx: moist mucosa, no lesions, dentition intact  Heart: Tachycardic, S1/S2, grade 2/6 JENNY best heard at LLSB, no gallop or rub, JVP 8 cm   Lungs: clear, no rales or wheezing  Abdomen: soft, non-tender, bowel sounds present, no hepatomegaly  Extremities: no clubbing, cyanosis or LE edema  Neurological: normal speech and affect, no gross motor deficits    VAD Interrogation: VAD interrogation reviewed with VAD coordinator. Agree with findings. No PI events, power spikes, speed drops, or other findings suspicious of pump malfunction noted.       Patient seen in [facility] for evaluation and iterative programming of [employer] single lead ICD per MD orders. Patient has an appointment to see Dr. Watkins today. Normal ICD function. 324 ventricular episodes recorded. 258 episodes in VT1 monitor only zone, lasting :12-8:37.  Rates 162-164 bpm.  63 nonsustained V episodes lasting :13-:26.  No therapy delivered. Intrinsic rhythm = AT @ 148 bpm with short breaks where he is sinus from 60-70 bpm.  These breaks do not last long enough to do ventricular threshold testing.   = <1%.   Estimated battery longevity to LINCOLN = 12 years. No short v-v intervals recorded. RV lead impedance trends appear stable. Patient reports that he has been  feeling very run " down the last few day.  This correlates with an increase in rapid ventricular rhythms on 12/4, 12/5, and 12/6. Plan for patient to send a remote transmission in 3 months and return to clinic in 6 months      Labs:  Reviewed INR, BMP, albumin     A-wave: 8 mmHg  V-wave: 5 mmHg  Mean: 5 mmHg   HR: 57 bpm  RV  Systolic: 30 mmHg  End Diastolic: 5 mmHg  HR: 54 bpm    PA  Systolic:30 mmHg  Diasotlic: 17 mmHg  Mean: 21 mmHg  HR: 53 bpm      PCW  A-wave: 10 mmHg  V-wave: 12 mmHg  Mean: 10 mmHg  HR: 52 bpm      Cardiac Output  CO Timmy: 3.57 L/min  CI Timmy: 1.64 L/min/m2  CO TD: 3.43 L/min  CI TD: 1.58 L/min/m2    Vitals  BP: 88 mmHg / 67 mmHg  SpO2: 97 %  PA Stat: 57.6 %    Diagnostics:  Interpretation Summary  LVAD ramp study with settings ranging from 5300 to 5600 rpm. Please refer to  the EPIC report for measurements performed at different LVAD speed settings.     Baseline speed was 5500 rpm. At baseline setting there was moderate left  ventricular dilatation with LVIDd 6.4 cm. Right ventricular dilatation was  moderate and right ventricular systolic function appears mild-moderately  reduced. Ventricular septum deviates to the right.  Limited Doppler of inflow/outflow cannula.  Aortic valve does not open.  Mild to moderate aortic insufficiency is continuous and worse compared to  study done 12/11/18.      Assessment and Plan:   Mr. Danielito Chu is a 63 yr old male with a history of NICM (LVEF at dong of 15%) s/p ICD (4/2017),  who underwent HM3 LVAD implant on 12/5/18 as DT ([facility], liver disease). He is now cleared for transplant and is on the list (status 4).  In an atrial arrhythmia today-rates in the 140s-stable    Chronic systolic heart failure secondary to NICM  Stage D  NYHA Class II  ACEi/ARB:  Yes, losartan   BB: yes   Aldosterone antagonist: yes  SCD prophylaxis:  ICD  Fluid status:  euvolemic -  MAP: at goal of 65-80   Anti-coag goal: ASA 81, INR 2-3     Other:     NSVT controlled,  has ICD, now on beta blocker     Atrial tachycardia based on EKG and interrogation today.  This appears to be pretty frequent over the last 4 to 5 days.  Overall he is tolerating it-however tachycardic given how fast it is I do not think his RV will tolerate it long-term.  I am starting amiodarone 200 mg 3 times daily x10 days.  We will do an ICD interrogation next week.  If he is still in it I will have him see electrophysiology as he may need an ablation.  If he has control of it next week I would do a course of amiodarone for a few months and then trial off of it with a higher dose of beta-blocker.  We are checking TSH today as well.  I do not think he is decompensated so I cannot really blame decompensation as a cause of his arrhythmia.       Transplant candidacy:on as status 4     Return to clinic in 3 months-sooner if transplant testing warrants a sooner return    Lorena Watkins MD       CC  Patient Care Team:  Bryan Orellana MD as PCP - General  Lorena Watkins MD as MD (Cardiology)  Deysi Mattson APRN CNP as Nurse Practitioner (Cardiology)  Candido Mendez MD as MD (Gastroenterology)  Edgardo Elias RN as VAD Coordinator (Cardiology)  Earlene Giron APRN CNP as Nurse Practitioner (Clinical Cardiac Electrophysiology)  Daniel Zavala MD as MD (Clinical Cardiac Electrophysiology)  Arely Wilson MD as MD (INTERNAL MEDICINE - ENDOCRINOLOGY, DIABETES & METABOLISM)  Lorena Watkins MD as Referring Physician (Cardiology)  Minoo Lopez NP as Nurse Practitioner (Nurse Practitioner - Family)  SELF, REFERRED

## 2019-12-07 LAB
DLCOUNC-%PRED-PRE: 58 %
DLCOUNC-PRE: 15.28 ML/MIN/MMHG
DLCOUNC-PRED: 26.28 ML/MIN/MMHG
ERV-%PRED-PRE: 35 %
ERV-PRE: 0.25 L
ERV-PRED: 0.71 L
EXPTIME-PRE: 7.87 SEC
FEF2575-%PRED-PRE: 54 %
FEF2575-PRE: 1.41 L/SEC
FEF2575-PRED: 2.61 L/SEC
FEFMAX-%PRED-PRE: 102 %
FEFMAX-PRE: 8.97 L/SEC
FEFMAX-PRED: 8.77 L/SEC
FEV1-%PRED-PRE: 62 %
FEV1-PRE: 1.97 L
FEV1FEV6-PRE: 75 %
FEV1FEV6-PRED: 79 %
FEV1FVC-PRE: 75 %
FEV1FVC-PRED: 78 %
FEV1SVC-PRE: 70 %
FEV1SVC-PRED: 66 %
FIFMAX-PRE: 4.55 L/SEC
FRCPLETH-%PRED-PRE: 49 %
FRCPLETH-PRE: 1.78 L
FRCPLETH-PRED: 3.58 L
FVC-%PRED-PRE: 65 %
FVC-PRE: 2.64 L
FVC-PRED: 4.02 L
HCV AB SERPL QL IA: NONREACTIVE
IC-%PRED-PRE: 63 %
IC-PRE: 2.56 L
IC-PRED: 4.04 L
RVPLETH-%PRED-PRE: 62 %
RVPLETH-PRE: 1.53 L
RVPLETH-PRED: 2.45 L
TLCPLETH-%PRED-PRE: 62 %
TLCPLETH-PRE: 4.34 L
TLCPLETH-PRED: 6.92 L
VA-%PRED-PRE: 68 %
VA-PRE: 4.28 L
VC-%PRED-PRE: 59 %
VC-PRE: 2.81 L
VC-PRED: 4.75 L

## 2019-12-09 LAB — INTERPRETATION ECG - MUSE: NORMAL

## 2019-12-11 LAB
MDC_IDC_LEAD_IMPLANT_DT: NORMAL
MDC_IDC_LEAD_LOCATION: NORMAL
MDC_IDC_LEAD_LOCATION_DETAIL_1: NORMAL
MDC_IDC_LEAD_MFG: NORMAL
MDC_IDC_LEAD_MODEL: NORMAL
MDC_IDC_LEAD_POLARITY_TYPE: NORMAL
MDC_IDC_LEAD_SERIAL: NORMAL
MDC_IDC_MSMT_BATTERY_DTM: NORMAL
MDC_IDC_MSMT_BATTERY_REMAINING_LONGEVITY: 144 MO
MDC_IDC_MSMT_BATTERY_STATUS: NORMAL
MDC_IDC_MSMT_CAP_CHARGE_DTM: NORMAL
MDC_IDC_MSMT_CAP_CHARGE_ENERGY: 0 J
MDC_IDC_MSMT_CAP_CHARGE_TIME: 0 S
MDC_IDC_MSMT_CAP_CHARGE_TIME: 10.45
MDC_IDC_MSMT_CAP_CHARGE_TYPE: NORMAL
MDC_IDC_MSMT_CAP_CHARGE_TYPE: NORMAL
MDC_IDC_MSMT_LEADCHNL_RV_IMPEDANCE_VALUE: 400 OHM
MDC_IDC_MSMT_LEADCHNL_RV_SENSING_INTR_AMPL: 4.5 MV
MDC_IDC_PG_IMPLANT_DTM: NORMAL
MDC_IDC_PG_MFG: NORMAL
MDC_IDC_PG_MODEL: NORMAL
MDC_IDC_PG_SERIAL: NORMAL
MDC_IDC_PG_TYPE: NORMAL
MDC_IDC_SESS_CLINIC_NAME: NORMAL
MDC_IDC_SESS_DTM: NORMAL
MDC_IDC_SESS_TYPE: NORMAL
MDC_IDC_SET_BRADY_HYSTRATE: NORMAL
MDC_IDC_SET_BRADY_LOWRATE: 40 {BEATS}/MIN
MDC_IDC_SET_BRADY_MODE: NORMAL
MDC_IDC_SET_LEADCHNL_RV_PACING_AMPLITUDE: 3 V
MDC_IDC_SET_LEADCHNL_RV_PACING_ANODE_ELECTRODE_1: NORMAL
MDC_IDC_SET_LEADCHNL_RV_PACING_ANODE_LOCATION_1: NORMAL
MDC_IDC_SET_LEADCHNL_RV_PACING_CAPTURE_MODE: NORMAL
MDC_IDC_SET_LEADCHNL_RV_PACING_CATHODE_ELECTRODE_1: NORMAL
MDC_IDC_SET_LEADCHNL_RV_PACING_CATHODE_LOCATION_1: NORMAL
MDC_IDC_SET_LEADCHNL_RV_PACING_POLARITY: NORMAL
MDC_IDC_SET_LEADCHNL_RV_PACING_PULSEWIDTH: 0.5 MS
MDC_IDC_SET_LEADCHNL_RV_SENSING_ADAPTATION_MODE: NORMAL
MDC_IDC_SET_LEADCHNL_RV_SENSING_ANODE_ELECTRODE_1: NORMAL
MDC_IDC_SET_LEADCHNL_RV_SENSING_ANODE_LOCATION_1: NORMAL
MDC_IDC_SET_LEADCHNL_RV_SENSING_CATHODE_ELECTRODE_1: NORMAL
MDC_IDC_SET_LEADCHNL_RV_SENSING_CATHODE_LOCATION_1: NORMAL
MDC_IDC_SET_LEADCHNL_RV_SENSING_POLARITY: NORMAL
MDC_IDC_SET_LEADCHNL_RV_SENSING_SENSITIVITY: 0.6 MV
MDC_IDC_SET_ZONE_DETECTION_INTERVAL: 250 MS
MDC_IDC_SET_ZONE_DETECTION_INTERVAL: 300 MS
MDC_IDC_SET_ZONE_DETECTION_INTERVAL: 375 MS
MDC_IDC_SET_ZONE_TYPE: NORMAL
MDC_IDC_SET_ZONE_VENDOR_TYPE: NORMAL
MDC_IDC_STAT_BRADY_RV_PERCENT_PACED: 1 %
MDC_IDC_STAT_EPISODE_RECENT_COUNT: 258
MDC_IDC_STAT_EPISODE_RECENT_COUNT: 258
MDC_IDC_STAT_EPISODE_RECENT_COUNT: 63
MDC_IDC_STAT_EPISODE_RECENT_COUNT_DTM_END: NORMAL
MDC_IDC_STAT_EPISODE_RECENT_COUNT_DTM_START: NORMAL
MDC_IDC_STAT_EPISODE_TOTAL_COUNT: 386
MDC_IDC_STAT_EPISODE_TOTAL_COUNT: 386
MDC_IDC_STAT_EPISODE_TOTAL_COUNT: 410
MDC_IDC_STAT_EPISODE_TOTAL_COUNT_DTM_END: NORMAL
MDC_IDC_STAT_EPISODE_TYPE: NORMAL
MDC_IDC_STAT_EPISODE_VENDOR_TYPE: NORMAL
MDC_IDC_STAT_TACHYTHERAPY_ATP_DELIVERED_RECENT: 0
MDC_IDC_STAT_TACHYTHERAPY_ATP_DELIVERED_TOTAL: 0
MDC_IDC_STAT_TACHYTHERAPY_RECENT_DTM_END: NORMAL
MDC_IDC_STAT_TACHYTHERAPY_RECENT_DTM_START: NORMAL
MDC_IDC_STAT_TACHYTHERAPY_SHOCKS_ABORTED_RECENT: 0
MDC_IDC_STAT_TACHYTHERAPY_SHOCKS_ABORTED_TOTAL: 0
MDC_IDC_STAT_TACHYTHERAPY_SHOCKS_DELIVERED_RECENT: 0
MDC_IDC_STAT_TACHYTHERAPY_SHOCKS_DELIVERED_TOTAL: 0
MDC_IDC_STAT_TACHYTHERAPY_TOTAL_DTM_END: NORMAL

## 2019-12-12 ENCOUNTER — ANCILLARY PROCEDURE (OUTPATIENT)
Dept: CARDIOLOGY | Facility: CLINIC | Age: 64
End: 2019-12-12
Attending: INTERNAL MEDICINE
Payer: COMMERCIAL

## 2019-12-12 PROCEDURE — 93296 REM INTERROG EVL PM/IDS: CPT | Mod: ZF

## 2019-12-12 PROCEDURE — 93295 DEV INTERROG REMOTE 1/2/MLT: CPT | Mod: ZP | Performed by: INTERNAL MEDICINE

## 2019-12-13 ENCOUNTER — CARE COORDINATION (OUTPATIENT)
Dept: CARDIOLOGY | Facility: CLINIC | Age: 64
End: 2019-12-13

## 2019-12-13 DIAGNOSIS — I50.22 CHRONIC SYSTOLIC CONGESTIVE HEART FAILURE (H): ICD-10-CM

## 2019-12-13 RX ORDER — AMIODARONE HYDROCHLORIDE 200 MG/1
200 TABLET ORAL DAILY
Qty: 90 TABLET | Refills: 3 | COMMUNITY
Start: 2019-01-01 | End: 2019-12-17

## 2019-12-13 RX ORDER — AMIODARONE HYDROCHLORIDE 200 MG/1
200 TABLET ORAL DAILY
Qty: 90 TABLET | Refills: 3 | Status: SHIPPED | OUTPATIENT
Start: 2019-01-01 | End: 2019-12-13

## 2019-12-17 ENCOUNTER — CARE COORDINATION (OUTPATIENT)
Dept: CARDIOLOGY | Facility: CLINIC | Age: 64
End: 2019-12-17

## 2019-12-17 DIAGNOSIS — I50.22 CHRONIC SYSTOLIC CONGESTIVE HEART FAILURE (H): ICD-10-CM

## 2019-12-17 RX ORDER — AMIODARONE HYDROCHLORIDE 200 MG/1
200 TABLET ORAL DAILY
Qty: 90 TABLET | Refills: 3 | COMMUNITY
Start: 2019-01-01 | End: 2020-01-01

## 2019-12-17 NOTE — PROGRESS NOTES
Fabiano called in for clarification on his amiodarone dosing. From 12/9 through 12/21 he is to take 200mg TID. Starting on 12/22, he should switch to 200mg daily. Dr. Watkins said his thyroid numbers were good. We will check his device ICD at the next scheduled check. Fabiano should call in with more feelings of rapid heart rate. He said he only felt it once over the weekend.

## 2019-12-20 NOTE — PROGRESS NOTES
ANTICOAGULATION FOLLOW-UP CLINIC VISIT    Patient Name:  Danielito Chu  Date:  12/20/2019  Contact Type:  Telephone    SUBJECTIVE:  Patient Findings     Positives:   Change in medications    Comments:   Per epic note, amiodarone started - from 12/9 - 12/21 pt to take 200 mg TID.  Starting 12/22 this will be 200 mg daily.  Pt verbalizes this plan also  Pt phoned today as a reminder to get the INR & he states the result to writer as this was done earlier today         Clinical Outcomes     Comments:   Per epic note, amiodarone started - from 12/9 - 12/21 pt to take 200 mg TID.  Starting 12/22 this will be 200 mg daily.  Pt verbalizes this plan also  Pt phoned today as a reminder to get the INR & he states the result to writer as this was done earlier today            OBJECTIVE    INR   Date Value Ref Range Status   12/20/2019 3.5 (A) 0.90 - 1.10 Final     Factor 2 Assay   Date Value Ref Range Status   10/27/2018 70 60 - 140 % Final     Comment:     The Factor 2 activity level is not a screening test for the Prothrombin 67505   mutation.         ASSESSMENT / PLAN  INR assessment SUPRA    Recheck INR In: 3 DAYS    INR Location Outside lab      Anticoagulation Summary  As of 12/20/2019    INR goal:   2.0-3.0   TTR:   84.1 % (1 y)   INR used for dosing:   3.5! (12/20/2019)   Warfarin maintenance plan:   4 mg (2 mg x 2) every Tue; 3 mg (2 mg x 1.5) all other days   Full warfarin instructions:   12/20: 2 mg; Otherwise 4 mg every Tue; 3 mg all other days   Weekly warfarin total:   22 mg   Plan last modified:   Giovanni Ordonez [facility] (5/15/2019)   Next INR check:   12/23/2019   Priority:   Critical   Target end date:   Indefinite    Indications    LVAD (left ventricular assist device) present (H) [Z95.811]  Long term (current) use of anticoagulants [Z79.01]             Anticoagulation Episode Summary     INR check location:       Preferred lab:       Send INR reminders to:   [facility]    Comments:   [place]  4/27-5/8 [facility] [phone]/Fax [phone]  LVAD plced 12/5/18 HM 3 ASA 81mg Daily Jantoven 2mg tabs Lab [phone]  Call pt at [phone] Emphasize next INR date with pt++Send email each time++  Pt is home .      Anticoagulation Care Providers     Provider Role Specialty Phone number    Lorena Watkins MD Responsible Cardiology [phone]            See the Encounter Report to view Anticoagulation Flowsheet and Dosing Calendar (Go to Encounters tab in chart review, and find the Anticoagulation Therapy Visit)    See above notation  Spoke with the pt    Rosmery Hickman RN

## 2019-12-20 NOTE — PROGRESS NOTES
Writer called to notify patient of the [employer] Heartmate Mobile Power Unit (MPU) and electrostatic discharge (ESD) urgent recall/advisory. Writer discussed the below, as it relates to the recall.    There is a risk for power loss and pump stop if patient is exposed to ESD while using the MPU    The company reported this occurrence at a rate of 0.2%. There were two reports of serious injury and zero reports of death.     Neither the [facility] VAD leadership team, nor the  ([employer]) recommend replacement of the MPU at this time. No  fix  for the device is needed.     Situations which may lead to this problem:  o Folding or changing bedsheets  o Taking laundry out of the dryer  o Dragging feet on the carpet  o Touching screens on older TVs or older computer (LCD and LED screens are less likely to cause static electricity)    o Low humidity and dry weather  o Vacuuming  o Any other activity that may lead to a buildup of static electricity     Signs electrostatic discharge has caused problems:  o An emergency, continuous tone alarm  o The green light on the MPU is not lit  o The controller message will show a  No External Power  alarm  o The pump may or may not be running    What steps the patient should take if experiencing an ESD event:  o Immediately change to battery power, which should resolve alarms and restart pump if stopped  o Verify alarms have resolved and the pump is running by looking at controller  o Page the VAD Coordinator on-call, who will advise patient on next steps and replace equipment prn  o Patient should not use the effected MPU - we will send a replacement  o Reinforce with patient that they should NOT change controller    Steps patient should take to avoid ESD  o Only use the MPU when sleeping   o Avoid previously discussed high risk situations for static discharge  o Remain on battery power if engaging in behaviors with a risk of static exposure  o Utilize a humidifier, dryer sheets,  lotion, and anti-static spray. Avoid wool, silk, and materials that are prone to static electricity buildup.  Patient verbalized understanding of above education. They will contact the VAD team if they have any questions or concerns. Patient notified that they will receive a letter reiterating this information as well.

## 2019-12-23 NOTE — PROGRESS NOTES
ANTICOAGULATION FOLLOW-UP CLINIC VISIT    Patient Name:  Danielito Chu  Date:  12/23/2019  Contact Type:  Telephone    SUBJECTIVE:  Patient Findings     Positives:   Change in medications (12/21 Amiodarone dose increased), Other complaints (Increased diarrhea, and less energy recently.)             OBJECTIVE    INR   Date Value Ref Range Status   12/23/2019 3.8 (A) 0.90 - 1.10 Final     Factor 2 Assay   Date Value Ref Range Status   10/27/2018 70 60 - 140 % Final     Comment:     The Factor 2 activity level is not a screening test for the Prothrombin 35684   mutation.         ASSESSMENT / PLAN  INR assessment SUPRA    Recheck INR In: 1 WEEK    INR Location Outside lab      Anticoagulation Summary  As of 12/23/2019    INR goal:   2.0-3.0   TTR:   83.3 % (1 y)   INR used for dosing:   3.8! (12/23/2019)   Warfarin maintenance plan:   4 mg (2 mg x 2) every Tue; 3 mg (2 mg x 1.5) all other days   Full warfarin instructions:   4 mg every Tue; 3 mg all other days   Weekly warfarin total:   22 mg   Plan last modified:   Giovanni Ordonez, [facility] (5/15/2019)   Next INR check:   12/30/2019   Priority:   Critical   Target end date:   Indefinite    Indications    LVAD (left ventricular assist device) present (H) [Z95.811]  Long term (current) use of anticoagulants [Z79.01]             Anticoagulation Episode Summary     INR check location:       Preferred lab:       Send INR reminders to:   [facility]    Comments:   [place] 4/27-5/8 [facility] [phone]/Fax [phone]  LVAD plced 12/5/18 HM 3 ASA 81mg Daily Jantoven 2mg tabs Lab [phone]  Call pt at [phone] Emphasize next INR date with pt++Send email each time++  Pt is home .      Anticoagulation Care Providers     Provider Role Specialty Phone number    Lorena Watkins MD [facility] Cardiology [phone]            See the Encounter Report to view Anticoagulation Flowsheet and Dosing Calendar (Go to Encounters  tab in chart review, and find the Anticoagulation Therapy Visit)  Spoke with patient.  Patient had LVAD placed on:   12/5/18  Type of LVAD: HM3  Patient's current Aspirin dose: 81mg  LVAD Protocol followed:  Yes  Lorena Kamara RN

## 2019-12-30 NOTE — PROGRESS NOTES
12/30/19:  Spoke with Fabiano.  He is not able to get to a clinic today due to weather.  He states he has been taking warfarin 3 mg MF and 2 mg all other days of the week.  He is taking amiodarone.  He will try to get to clinic tomorrow, 12/31/19.  Jeanette Bryant RN

## 2019-12-31 NOTE — PROGRESS NOTES
1/7/20 ADDENDUM  Spoke to Fabiano.  He will go into the lab tomorrow.  Updated calendar.  ARAVIND Barnard        ANTICOAGULATION FOLLOW-UP CLINIC VISIT    Patient Name:  Danielito Chu  Date:  12/31/2019  Contact Type:  Telephone    SUBJECTIVE:  Patient Findings     Comments:   Patient confirms that he took 3mg Mon and Fr and 2mg all other days.  Since INR has trended down writer instructed patient to follow same dosing pattern for the next week.         Clinical Outcomes     Comments:   Patient confirms that he took 3mg Mon and Fr and 2mg all other days.  Since INR has trended down writer instructed patient to follow same dosing pattern for the next week.            OBJECTIVE    INR   Date Value Ref Range Status   12/31/2019 3.2 (A) 0.90 - 1.10 Final     Factor 2 Assay   Date Value Ref Range Status   10/27/2018 70 60 - 140 % Final     Comment:     The Factor 2 activity level is not a screening test for the Prothrombin 19036   mutation.         ASSESSMENT / PLAN  No question data found.  Anticoagulation Summary  As of 12/31/2019    INR goal:   2.0-3.0   TTR:   82.9 % (1 y)   INR used for dosing:   3.2! (12/31/2019)   Warfarin maintenance plan:   No maintenance plan   Full warfarin instructions:   12/31: 2 mg; 1/1: 2 mg; 1/2: 2 mg; 1/3: 3 mg; 1/4: 2 mg; 1/5: 2 mg; 1/6: 3 mg   Plan last modified:   Bri Becerril RN (12/31/2019)   Next INR check:   1/7/2020   Priority:   Critical   Target end date:   Indefinite    Indications    LVAD (left ventricular assist device) present (H) [Z95.811]  Long term (current) use of anticoagulants [Z79.01]             Anticoagulation Episode Summary     INR check location:       Preferred lab:       Send INR reminders to:   [facility]    Comments:   [place] 4/27-5/8 [facility] [phone]/Fax [phone]  LVAD plced 12/5/18 HM 3 ASA 81mg Daily Jantoven 2mg tabs Lab [phone]  Call pt at [phone] Emphasize next INR date with pt++Send  email each time++  Pt is home .      Anticoagulation Care Providers     Provider Role Specialty Phone number    Lorena Watkins MD Referring Cardiology [phone]            See the Encounter Report to view Anticoagulation Flowsheet and Dosing Calendar (Go to Encounters tab in chart review, and find the Anticoagulation Therapy Visit)    Spoke with patient.     Bri Becerril RN

## 2020-01-01 ENCOUNTER — TELEPHONE (OUTPATIENT)
Dept: TRANSPLANT | Facility: CLINIC | Age: 65
End: 2020-01-01

## 2020-01-01 ENCOUNTER — APPOINTMENT (OUTPATIENT)
Dept: GENERAL RADIOLOGY | Facility: CLINIC | Age: 65
DRG: 207 | End: 2020-01-01
Attending: INTERNAL MEDICINE
Payer: COMMERCIAL

## 2020-01-01 ENCOUNTER — APPOINTMENT (OUTPATIENT)
Dept: GENERAL RADIOLOGY | Facility: CLINIC | Age: 65
DRG: 207 | End: 2020-01-01
Attending: STUDENT IN AN ORGANIZED HEALTH CARE EDUCATION/TRAINING PROGRAM
Payer: COMMERCIAL

## 2020-01-01 ENCOUNTER — CARE COORDINATION (OUTPATIENT)
Dept: CARDIOLOGY | Facility: CLINIC | Age: 65
End: 2020-01-01

## 2020-01-01 ENCOUNTER — ALLIED HEALTH/NURSE VISIT (OUTPATIENT)
Dept: CARDIOLOGY | Facility: CLINIC | Age: 65
End: 2020-01-01
Attending: INTERNAL MEDICINE
Payer: COMMERCIAL

## 2020-01-01 ENCOUNTER — ANTICOAGULATION THERAPY VISIT (OUTPATIENT)
Dept: ANTICOAGULATION | Facility: CLINIC | Age: 65
End: 2020-01-01

## 2020-01-01 ENCOUNTER — TELEPHONE (OUTPATIENT)
Dept: ANTICOAGULATION | Facility: CLINIC | Age: 65
End: 2020-01-01

## 2020-01-01 ENCOUNTER — ANCILLARY PROCEDURE (OUTPATIENT)
Dept: ULTRASOUND IMAGING | Facility: CLINIC | Age: 65
End: 2020-01-01
Attending: INTERNAL MEDICINE
Payer: COMMERCIAL

## 2020-01-01 ENCOUNTER — ANESTHESIA (OUTPATIENT)
Dept: INTENSIVE CARE | Facility: CLINIC | Age: 65
End: 2020-01-01
Payer: COMMERCIAL

## 2020-01-01 ENCOUNTER — APPOINTMENT (OUTPATIENT)
Dept: CT IMAGING | Facility: CLINIC | Age: 65
DRG: 207 | End: 2020-01-01
Attending: INTERNAL MEDICINE
Payer: COMMERCIAL

## 2020-01-01 ENCOUNTER — APPOINTMENT (OUTPATIENT)
Dept: CARDIOLOGY | Facility: CLINIC | Age: 65
DRG: 207 | End: 2020-01-01
Attending: STUDENT IN AN ORGANIZED HEALTH CARE EDUCATION/TRAINING PROGRAM
Payer: COMMERCIAL

## 2020-01-01 ENCOUNTER — APPOINTMENT (OUTPATIENT)
Dept: ULTRASOUND IMAGING | Facility: CLINIC | Age: 65
DRG: 207 | End: 2020-01-01
Attending: STUDENT IN AN ORGANIZED HEALTH CARE EDUCATION/TRAINING PROGRAM
Payer: COMMERCIAL

## 2020-01-01 ENCOUNTER — APPOINTMENT (OUTPATIENT)
Dept: ULTRASOUND IMAGING | Facility: CLINIC | Age: 65
DRG: 207 | End: 2020-01-01
Attending: INTERNAL MEDICINE
Payer: COMMERCIAL

## 2020-01-01 ENCOUNTER — DOCUMENTATION ONLY (OUTPATIENT)
Dept: CARDIOLOGY | Facility: CLINIC | Age: 65
End: 2020-01-01

## 2020-01-01 ENCOUNTER — VIRTUAL VISIT (OUTPATIENT)
Dept: RHEUMATOLOGY | Facility: CLINIC | Age: 65
End: 2020-01-01
Attending: INTERNAL MEDICINE
Payer: COMMERCIAL

## 2020-01-01 ENCOUNTER — DOCUMENTATION ONLY (OUTPATIENT)
Dept: TRANSPLANT | Facility: CLINIC | Age: 65
End: 2020-01-01

## 2020-01-01 ENCOUNTER — TELEPHONE (OUTPATIENT)
Dept: CARDIOLOGY | Facility: CLINIC | Age: 65
End: 2020-01-01

## 2020-01-01 ENCOUNTER — APPOINTMENT (OUTPATIENT)
Dept: ULTRASOUND IMAGING | Facility: CLINIC | Age: 65
DRG: 207 | End: 2020-01-01
Attending: NURSE PRACTITIONER
Payer: COMMERCIAL

## 2020-01-01 ENCOUNTER — DOCUMENTATION ONLY (OUTPATIENT)
Dept: ANTICOAGULATION | Facility: CLINIC | Age: 65
End: 2020-01-01

## 2020-01-01 ENCOUNTER — APPOINTMENT (OUTPATIENT)
Dept: MEDSURG UNIT | Facility: CLINIC | Age: 65
End: 2020-01-01
Attending: INTERNAL MEDICINE
Payer: COMMERCIAL

## 2020-01-01 ENCOUNTER — APPOINTMENT (OUTPATIENT)
Dept: CT IMAGING | Facility: CLINIC | Age: 65
DRG: 207 | End: 2020-01-01
Attending: PHYSICIAN ASSISTANT
Payer: COMMERCIAL

## 2020-01-01 ENCOUNTER — APPOINTMENT (OUTPATIENT)
Dept: OCCUPATIONAL THERAPY | Facility: CLINIC | Age: 65
DRG: 207 | End: 2020-01-01
Attending: STUDENT IN AN ORGANIZED HEALTH CARE EDUCATION/TRAINING PROGRAM
Payer: COMMERCIAL

## 2020-01-01 ENCOUNTER — ALLIED HEALTH/NURSE VISIT (OUTPATIENT)
Dept: TRANSPLANT | Facility: CLINIC | Age: 65
End: 2020-01-01
Attending: INTERNAL MEDICINE
Payer: COMMERCIAL

## 2020-01-01 ENCOUNTER — ANCILLARY PROCEDURE (OUTPATIENT)
Dept: CT IMAGING | Facility: CLINIC | Age: 65
End: 2020-01-01
Attending: INTERNAL MEDICINE
Payer: COMMERCIAL

## 2020-01-01 ENCOUNTER — ANCILLARY PROCEDURE (OUTPATIENT)
Dept: CARDIOLOGY | Facility: CLINIC | Age: 65
End: 2020-01-01
Attending: PHYSICIAN ASSISTANT
Payer: COMMERCIAL

## 2020-01-01 ENCOUNTER — APPOINTMENT (OUTPATIENT)
Dept: CARDIOLOGY | Facility: CLINIC | Age: 65
DRG: 207 | End: 2020-01-01
Attending: INTERNAL MEDICINE
Payer: COMMERCIAL

## 2020-01-01 ENCOUNTER — APPOINTMENT (OUTPATIENT)
Dept: GENERAL RADIOLOGY | Facility: CLINIC | Age: 65
DRG: 207 | End: 2020-01-01
Attending: NURSE PRACTITIONER
Payer: COMMERCIAL

## 2020-01-01 ENCOUNTER — VIRTUAL VISIT (OUTPATIENT)
Dept: PALLIATIVE CARE | Facility: CLINIC | Age: 65
End: 2020-01-01
Attending: INTERNAL MEDICINE
Payer: COMMERCIAL

## 2020-01-01 ENCOUNTER — ANESTHESIA EVENT (OUTPATIENT)
Dept: INTENSIVE CARE | Facility: CLINIC | Age: 65
End: 2020-01-01

## 2020-01-01 ENCOUNTER — TRANSFERRED RECORDS (OUTPATIENT)
Dept: HEALTH INFORMATION MANAGEMENT | Facility: CLINIC | Age: 65
End: 2020-01-01

## 2020-01-01 ENCOUNTER — APPOINTMENT (OUTPATIENT)
Dept: CT IMAGING | Facility: CLINIC | Age: 65
DRG: 207 | End: 2020-01-01
Attending: STUDENT IN AN ORGANIZED HEALTH CARE EDUCATION/TRAINING PROGRAM
Payer: COMMERCIAL

## 2020-01-01 ENCOUNTER — APPOINTMENT (OUTPATIENT)
Dept: CARDIOLOGY | Facility: CLINIC | Age: 65
End: 2020-01-01
Payer: COMMERCIAL

## 2020-01-01 ENCOUNTER — HOSPITAL ENCOUNTER (INPATIENT)
Facility: CLINIC | Age: 65
LOS: 15 days | DRG: 207 | End: 2020-12-18
Attending: INTERNAL MEDICINE | Admitting: ANESTHESIOLOGY
Payer: COMMERCIAL

## 2020-01-01 ENCOUNTER — HOSPITAL ENCOUNTER (OUTPATIENT)
Facility: CLINIC | Age: 65
Discharge: HOME | End: 2020-07-02
Attending: INTERNAL MEDICINE | Admitting: INTERNAL MEDICINE
Payer: COMMERCIAL

## 2020-01-01 VITALS
WEIGHT: 237 LBS | OXYGEN SATURATION: 97 % | SYSTOLIC BLOOD PRESSURE: 80 MMHG | HEART RATE: 82 BPM | HEIGHT: 70 IN | BODY MASS INDEX: 33.93 KG/M2

## 2020-01-01 VITALS
DIASTOLIC BLOOD PRESSURE: 62 MMHG | HEART RATE: 71 BPM | RESPIRATION RATE: 22 BRPM | TEMPERATURE: 98.2 F | SYSTOLIC BLOOD PRESSURE: 76 MMHG | OXYGEN SATURATION: 95 %

## 2020-01-01 VITALS
BODY MASS INDEX: 28.66 KG/M2 | RESPIRATION RATE: 12 BRPM | HEIGHT: 70 IN | WEIGHT: 200.18 LBS | OXYGEN SATURATION: 94 % | DIASTOLIC BLOOD PRESSURE: 31 MMHG | TEMPERATURE: 101.4 F | HEART RATE: 94 BPM | SYSTOLIC BLOOD PRESSURE: 90 MMHG

## 2020-01-01 VITALS — WEIGHT: 239.7 LBS | SYSTOLIC BLOOD PRESSURE: 80 MMHG | BODY MASS INDEX: 34.32 KG/M2 | HEIGHT: 70 IN

## 2020-01-01 VITALS — SYSTOLIC BLOOD PRESSURE: 74 MMHG

## 2020-01-01 DIAGNOSIS — I42.9 CARDIOMYOPATHY (H): ICD-10-CM

## 2020-01-01 DIAGNOSIS — Z79.01 LONG TERM (CURRENT) USE OF ANTICOAGULANTS: ICD-10-CM

## 2020-01-01 DIAGNOSIS — I42.8 NONISCHEMIC CARDIOMYOPATHY (H): ICD-10-CM

## 2020-01-01 DIAGNOSIS — Z95.811 LVAD (LEFT VENTRICULAR ASSIST DEVICE) PRESENT (H): ICD-10-CM

## 2020-01-01 DIAGNOSIS — Z95.811 LVAD (LEFT VENTRICULAR ASSIST DEVICE) PRESENT (H): Primary | ICD-10-CM

## 2020-01-01 DIAGNOSIS — I42.8 NONISCHEMIC CARDIOMYOPATHY (H): Primary | ICD-10-CM

## 2020-01-01 DIAGNOSIS — Z76.82 AWAITING ORGAN TRANSPLANT: ICD-10-CM

## 2020-01-01 DIAGNOSIS — I50.22 CHRONIC SYSTOLIC CONGESTIVE HEART FAILURE (H): ICD-10-CM

## 2020-01-01 DIAGNOSIS — M10.9 ACUTE GOUT OF RIGHT FOOT, UNSPECIFIED CAUSE: ICD-10-CM

## 2020-01-01 DIAGNOSIS — I50.23 ACUTE ON CHRONIC SYSTOLIC HEART FAILURE (H): ICD-10-CM

## 2020-01-01 DIAGNOSIS — Z76.82 AWAITING ORGAN TRANSPLANT: Primary | ICD-10-CM

## 2020-01-01 DIAGNOSIS — I50.22 CHRONIC SYSTOLIC CONGESTIVE HEART FAILURE (H): Primary | ICD-10-CM

## 2020-01-01 DIAGNOSIS — Z76.82 HEART TRANSPLANT CANDIDATE: Primary | ICD-10-CM

## 2020-01-01 DIAGNOSIS — Z94.1 HEART REPLACED BY TRANSPLANT (H): ICD-10-CM

## 2020-01-01 DIAGNOSIS — Z11.59 ENCOUNTER FOR SCREENING FOR OTHER VIRAL DISEASES: Primary | ICD-10-CM

## 2020-01-01 DIAGNOSIS — I50.22 CHRONIC SYSTOLIC HEART FAILURE (H): ICD-10-CM

## 2020-01-01 DIAGNOSIS — Z94.1 HEART REPLACED BY TRANSPLANT (H): Primary | ICD-10-CM

## 2020-01-01 DIAGNOSIS — I42.9 CARDIOMYOPATHY (H): Primary | ICD-10-CM

## 2020-01-01 DIAGNOSIS — M10.9 ACUTE GOUT OF RIGHT FOOT, UNSPECIFIED CAUSE: Primary | ICD-10-CM

## 2020-01-01 LAB
6 MIN WALK (FT): 1400 FT
6 MIN WALK (M): 427 M
ABO + RH BLD: NORMAL
ACETAMINOPHEN QUAL: NEGATIVE
ALBUMIN SERPL-MCNC: 1.8 G/DL (ref 3.4–5)
ALBUMIN SERPL-MCNC: 1.9 G/DL (ref 3.4–5)
ALBUMIN SERPL-MCNC: 2 G/DL (ref 3.4–5)
ALBUMIN SERPL-MCNC: 2.1 G/DL (ref 3.4–5)
ALBUMIN SERPL-MCNC: 2.2 G/DL (ref 3.4–5)
ALBUMIN SERPL-MCNC: 2.3 G/DL (ref 3.4–5)
ALBUMIN SERPL-MCNC: 2.4 G/DL (ref 3.4–5)
ALBUMIN SERPL-MCNC: 2.6 G/DL (ref 3.4–5)
ALBUMIN SERPL-MCNC: 2.6 G/DL (ref 3.4–5)
ALBUMIN SERPL-MCNC: 4 G/DL (ref 3.4–5)
ALBUMIN SERPL-MCNC: 4 G/DL (ref 3.4–5)
ALBUMIN SERPL-MCNC: 4.1 G/DL
ALBUMIN SERPL-MCNC: 4.1 G/DL (ref 3.4–5)
ALBUMIN UR-MCNC: 30 MG/DL
ALBUMIN UR-MCNC: 30 MG/DL
ALBUMIN UR-MCNC: NEGATIVE MG/DL
ALP SERPL-CCNC: 100 U/L (ref 40–150)
ALP SERPL-CCNC: 101 U/L (ref 40–150)
ALP SERPL-CCNC: 104 U/L (ref 40–150)
ALP SERPL-CCNC: 106 U/L (ref 40–150)
ALP SERPL-CCNC: 106 U/L (ref 40–150)
ALP SERPL-CCNC: 108 U/L (ref 40–150)
ALP SERPL-CCNC: 109 U/L (ref 40–150)
ALP SERPL-CCNC: 109 U/L (ref 40–150)
ALP SERPL-CCNC: 112 U/L (ref 40–150)
ALP SERPL-CCNC: 112 U/L (ref 40–150)
ALP SERPL-CCNC: 124 U/L (ref 40–150)
ALP SERPL-CCNC: 130 U/L (ref 40–150)
ALP SERPL-CCNC: 131 U/L (ref 40–150)
ALP SERPL-CCNC: 48 U/L (ref 40–150)
ALP SERPL-CCNC: 61 U/L (ref 40–150)
ALP SERPL-CCNC: 76 U/L (ref 40–150)
ALP SERPL-CCNC: 84 U/L
ALP SERPL-CCNC: 85 U/L (ref 40–150)
ALP SERPL-CCNC: 88 U/L (ref 40–150)
ALP SERPL-CCNC: 89 U/L (ref 40–150)
ALP SERPL-CCNC: 97 U/L (ref 40–150)
ALP SERPL-CCNC: 98 U/L (ref 40–150)
ALT SERPL W P-5'-P-CCNC: 111 U/L (ref 0–70)
ALT SERPL W P-5'-P-CCNC: 126 U/L (ref 0–70)
ALT SERPL W P-5'-P-CCNC: 148 U/L (ref 0–70)
ALT SERPL W P-5'-P-CCNC: 173 U/L (ref 0–70)
ALT SERPL W P-5'-P-CCNC: 224 U/L (ref 0–70)
ALT SERPL W P-5'-P-CCNC: 27 U/L (ref 0–70)
ALT SERPL W P-5'-P-CCNC: 271 U/L (ref 0–70)
ALT SERPL W P-5'-P-CCNC: 307 U/L (ref 0–70)
ALT SERPL W P-5'-P-CCNC: 394 U/L (ref 0–70)
ALT SERPL W P-5'-P-CCNC: 46 U/L (ref 0–70)
ALT SERPL W P-5'-P-CCNC: 47 U/L (ref 0–70)
ALT SERPL W P-5'-P-CCNC: 51 U/L (ref 0–70)
ALT SERPL W P-5'-P-CCNC: 53 U/L (ref 0–70)
ALT SERPL W P-5'-P-CCNC: 55 U/L (ref 0–70)
ALT SERPL W P-5'-P-CCNC: 56 U/L (ref 0–70)
ALT SERPL W P-5'-P-CCNC: 57 U/L (ref 0–70)
ALT SERPL W P-5'-P-CCNC: 59 U/L (ref 0–70)
ALT SERPL W P-5'-P-CCNC: 60 U/L (ref 0–70)
ALT SERPL W P-5'-P-CCNC: 60 U/L (ref 0–70)
ALT SERPL W P-5'-P-CCNC: 72 U/L (ref 0–70)
ALT SERPL W P-5'-P-CCNC: 86 U/L (ref 0–70)
ALT SERPL-CCNC: 23 U/L
AMANTADINE: NEGATIVE
AMITRIPTYLINE QUAL: NEGATIVE
AMOXAPINE: NEGATIVE
AMPHETAMINES QUAL: NEGATIVE
AMPHETAMINES UR QL SCN: NEGATIVE
AMYLASE SERPL-CCNC: 41 U/L (ref 30–110)
ANGLE RATE OF CLOT STRENGTH: 65.6 DEGREES (ref 53–72)
ANGLE RATE OF CLOT STRENGTH: 76.6 DEGREES (ref 53–72)
ANGLE RATE OF CLOT STRENGTH: 78.1 DEGREES (ref 53–72)
ANGLE RATE OF CLOT STRENGTH: 79.1 DEGREES (ref 53–72)
ANION GAP SERPL CALCULATED.3IONS-SCNC: 10 MMOL/L (ref 3–14)
ANION GAP SERPL CALCULATED.3IONS-SCNC: 11 MMOL/L (ref 3–14)
ANION GAP SERPL CALCULATED.3IONS-SCNC: 13 MMOL/L (ref 3–14)
ANION GAP SERPL CALCULATED.3IONS-SCNC: 16 MMOL/L
ANION GAP SERPL CALCULATED.3IONS-SCNC: 3 MMOL/L (ref 3–14)
ANION GAP SERPL CALCULATED.3IONS-SCNC: 4 MMOL/L (ref 3–14)
ANION GAP SERPL CALCULATED.3IONS-SCNC: 5 MMOL/L (ref 3–14)
ANION GAP SERPL CALCULATED.3IONS-SCNC: 6 MMOL/L (ref 3–14)
ANION GAP SERPL CALCULATED.3IONS-SCNC: 7 MMOL/L (ref 3–14)
ANION GAP SERPL CALCULATED.3IONS-SCNC: 8 MMOL/L (ref 3–14)
ANION GAP SERPL CALCULATED.3IONS-SCNC: 9 MMOL/L (ref 3–14)
ANION GAP SERPL CALCULATED.3IONS-SCNC: ABNORMAL MMOL/L
APPEARANCE UR: ABNORMAL
APPEARANCE UR: ABNORMAL
APPEARANCE UR: CLEAR
APTT PPP: 128 SEC (ref 22–37)
APTT PPP: 42 SEC (ref 22–37)
APTT PPP: 50 SEC (ref 22–37)
APTT PPP: 52 SEC (ref 22–37)
APTT PPP: 52 SEC (ref 22–37)
APTT PPP: 53 SEC (ref 22–37)
APTT PPP: 54 SEC (ref 22–37)
APTT PPP: 57 SEC (ref 22–37)
APTT PPP: 57 SEC (ref 22–37)
APTT PPP: 59 SEC (ref 22–37)
APTT PPP: 59 SEC (ref 22–37)
APTT PPP: 60 SEC (ref 22–37)
APTT PPP: 60 SEC (ref 22–37)
APTT PPP: 62 SEC (ref 22–37)
APTT PPP: 62 SEC (ref 22–37)
APTT PPP: 63 SEC (ref 22–37)
APTT PPP: 63 SEC (ref 22–37)
APTT PPP: 65 SEC (ref 22–37)
APTT PPP: 65 SEC (ref 22–37)
APTT PPP: 66 SEC (ref 22–37)
APTT PPP: 68 SEC (ref 22–37)
APTT PPP: 73 SEC (ref 22–37)
APTT PPP: 81 SEC (ref 22–37)
AST SERPL W P-5'-P-CCNC: 133 U/L (ref 0–45)
AST SERPL W P-5'-P-CCNC: 163 U/L (ref 0–45)
AST SERPL W P-5'-P-CCNC: 186 U/L (ref 0–45)
AST SERPL W P-5'-P-CCNC: 201 U/L (ref 0–45)
AST SERPL W P-5'-P-CCNC: 34 U/L (ref 0–45)
AST SERPL W P-5'-P-CCNC: 367 U/L (ref 0–45)
AST SERPL W P-5'-P-CCNC: 38 U/L (ref 0–45)
AST SERPL W P-5'-P-CCNC: 39 U/L (ref 0–45)
AST SERPL W P-5'-P-CCNC: 44 U/L (ref 0–45)
AST SERPL W P-5'-P-CCNC: 46 U/L (ref 0–45)
AST SERPL W P-5'-P-CCNC: 47 U/L (ref 0–45)
AST SERPL W P-5'-P-CCNC: 50 U/L (ref 0–45)
AST SERPL W P-5'-P-CCNC: 51 U/L (ref 0–45)
AST SERPL W P-5'-P-CCNC: 54 U/L (ref 0–45)
AST SERPL W P-5'-P-CCNC: 55 U/L (ref 0–45)
AST SERPL W P-5'-P-CCNC: 55 U/L (ref 0–45)
AST SERPL W P-5'-P-CCNC: 569 U/L (ref 0–45)
AST SERPL W P-5'-P-CCNC: 57 U/L (ref 0–45)
AST SERPL W P-5'-P-CCNC: 58 U/L (ref 0–45)
AST SERPL W P-5'-P-CCNC: 66 U/L (ref 0–45)
AST SERPL W P-5'-P-CCNC: 69 U/L (ref 0–45)
AST SERPL-CCNC: 28 U/L
ATROPINE: NEGATIVE
BACTERIA SPEC CULT: ABNORMAL
BACTERIA SPEC CULT: NO GROWTH
BACTERIA SPEC CULT: NORMAL
BARBITURATES UR QL: NEGATIVE
BASE DEFICIT BLDA-SCNC: 0.6 MMOL/L
BASE DEFICIT BLDA-SCNC: 1.3 MMOL/L
BASE DEFICIT BLDA-SCNC: 1.9 MMOL/L
BASE DEFICIT BLDA-SCNC: 14 MMOL/L
BASE DEFICIT BLDA-SCNC: 2.1 MMOL/L
BASE DEFICIT BLDA-SCNC: 3.4 MMOL/L
BASE DEFICIT BLDA-SCNC: 4 MMOL/L
BASE DEFICIT BLDA-SCNC: 5.1 MMOL/L
BASE DEFICIT BLDA-SCNC: 7.5 MMOL/L
BASE DEFICIT BLDV-SCNC: 0.6 MMOL/L
BASE DEFICIT BLDV-SCNC: 0.8 MMOL/L
BASE DEFICIT BLDV-SCNC: 0.9 MMOL/L
BASE DEFICIT BLDV-SCNC: 2 MMOL/L
BASE DEFICIT BLDV-SCNC: 3.1 MMOL/L
BASE DEFICIT BLDV-SCNC: 8.2 MMOL/L
BASE DEFICIT BLDV-SCNC: NORMAL MMOL/L
BASE EXCESS BLDA CALC-SCNC: 0.4 MMOL/L
BASE EXCESS BLDA CALC-SCNC: 0.6 MMOL/L
BASE EXCESS BLDA CALC-SCNC: 1.1 MMOL/L
BASE EXCESS BLDA CALC-SCNC: 1.5 MMOL/L
BASE EXCESS BLDA CALC-SCNC: 1.7 MMOL/L
BASE EXCESS BLDA CALC-SCNC: 1.9 MMOL/L
BASE EXCESS BLDA CALC-SCNC: 2.1 MMOL/L
BASE EXCESS BLDA CALC-SCNC: 3.2 MMOL/L
BASE EXCESS BLDA CALC-SCNC: 3.2 MMOL/L
BASE EXCESS BLDA CALC-SCNC: 3.7 MMOL/L
BASE EXCESS BLDA CALC-SCNC: 4.9 MMOL/L
BASE EXCESS BLDA CALC-SCNC: 5.2 MMOL/L
BASE EXCESS BLDA CALC-SCNC: 5.4 MMOL/L
BASE EXCESS BLDA CALC-SCNC: 5.4 MMOL/L
BASE EXCESS BLDA CALC-SCNC: 5.6 MMOL/L
BASE EXCESS BLDA CALC-SCNC: 5.7 MMOL/L
BASE EXCESS BLDA CALC-SCNC: 5.8 MMOL/L
BASE EXCESS BLDA CALC-SCNC: 6.5 MMOL/L
BASE EXCESS BLDA CALC-SCNC: 7.2 MMOL/L
BASE EXCESS BLDA CALC-SCNC: 7.8 MMOL/L
BASE EXCESS BLDA CALC-SCNC: 8.1 MMOL/L
BASE EXCESS BLDA CALC-SCNC: 8.2 MMOL/L
BASE EXCESS BLDA CALC-SCNC: 8.3 MMOL/L
BASE EXCESS BLDV CALC-SCNC: 0 MMOL/L
BASE EXCESS BLDV CALC-SCNC: 0.8 MMOL/L
BASE EXCESS BLDV CALC-SCNC: 1.1 MMOL/L
BASE EXCESS BLDV CALC-SCNC: 1.8 MMOL/L
BASE EXCESS BLDV CALC-SCNC: 10 MMOL/L
BASE EXCESS BLDV CALC-SCNC: 10.8 MMOL/L
BASE EXCESS BLDV CALC-SCNC: 2.5 MMOL/L
BASE EXCESS BLDV CALC-SCNC: 2.7 MMOL/L
BASE EXCESS BLDV CALC-SCNC: 2.7 MMOL/L
BASE EXCESS BLDV CALC-SCNC: 2.9 MMOL/L
BASE EXCESS BLDV CALC-SCNC: 3 MMOL/L
BASE EXCESS BLDV CALC-SCNC: 3.4 MMOL/L
BASE EXCESS BLDV CALC-SCNC: 3.5 MMOL/L
BASE EXCESS BLDV CALC-SCNC: 4 MMOL/L
BASE EXCESS BLDV CALC-SCNC: 4.1 MMOL/L
BASE EXCESS BLDV CALC-SCNC: 5.4 MMOL/L
BASE EXCESS BLDV CALC-SCNC: 5.7 MMOL/L
BASE EXCESS BLDV CALC-SCNC: 5.8 MMOL/L
BASE EXCESS BLDV CALC-SCNC: 5.9 MMOL/L
BASE EXCESS BLDV CALC-SCNC: 6 MMOL/L
BASE EXCESS BLDV CALC-SCNC: 6 MMOL/L
BASE EXCESS BLDV CALC-SCNC: 6.1 MMOL/L
BASE EXCESS BLDV CALC-SCNC: 6.3 MMOL/L
BASE EXCESS BLDV CALC-SCNC: 6.4 MMOL/L
BASE EXCESS BLDV CALC-SCNC: 6.5 MMOL/L
BASE EXCESS BLDV CALC-SCNC: 6.5 MMOL/L
BASE EXCESS BLDV CALC-SCNC: 7.4 MMOL/L
BASE EXCESS BLDV CALC-SCNC: 7.4 MMOL/L
BASE EXCESS BLDV CALC-SCNC: 7.5 MMOL/L
BASE EXCESS BLDV CALC-SCNC: 7.7 MMOL/L
BASE EXCESS BLDV CALC-SCNC: 7.7 MMOL/L
BASE EXCESS BLDV CALC-SCNC: 7.8 MMOL/L
BASE EXCESS BLDV CALC-SCNC: 9 MMOL/L
BASE EXCESS BLDV CALC-SCNC: 9.2 MMOL/L
BASE EXCESS BLDV CALC-SCNC: 9.4 MMOL/L
BASE EXCESS BLDV CALC-SCNC: 9.8 MMOL/L
BASE EXCESS BLDV CALC-SCNC: NORMAL MMOL/L
BASOPHILS # BLD AUTO: 0 10E9/L (ref 0–0.2)
BASOPHILS # BLD AUTO: 0.1 10E9/L (ref 0–0.2)
BASOPHILS NFR BLD AUTO: 0.1 %
BASOPHILS NFR BLD AUTO: 0.2 %
BASOPHILS NFR BLD AUTO: 0.4 %
BASOPHILS NFR BLD AUTO: NORMAL %
BENZODIAZ UR QL: NEGATIVE
BENZODIAZ UR QL: NEGATIVE
BILIRUB DIRECT SERPL-MCNC: 0.5 MG/DL (ref 0–0.2)
BILIRUB DIRECT SERPL-MCNC: 0.7 MG/DL (ref 0–0.2)
BILIRUB SERPL-MCNC: 0.7 MG/DL
BILIRUB SERPL-MCNC: 0.8 MG/DL (ref 0.2–1.3)
BILIRUB SERPL-MCNC: 0.8 MG/DL (ref 0.2–1.3)
BILIRUB SERPL-MCNC: 0.9 MG/DL (ref 0.2–1.3)
BILIRUB SERPL-MCNC: 1 MG/DL (ref 0.2–1.3)
BILIRUB SERPL-MCNC: 1.1 MG/DL (ref 0.2–1.3)
BILIRUB SERPL-MCNC: 1.1 MG/DL (ref 0.2–1.3)
BILIRUB SERPL-MCNC: 1.2 MG/DL (ref 0.2–1.3)
BILIRUB SERPL-MCNC: 1.3 MG/DL (ref 0.2–1.3)
BILIRUB SERPL-MCNC: 1.3 MG/DL (ref 0.2–1.3)
BILIRUB SERPL-MCNC: 1.4 MG/DL (ref 0.2–1.3)
BILIRUB SERPL-MCNC: 1.4 MG/DL (ref 0.2–1.3)
BILIRUB SERPL-MCNC: 1.5 MG/DL (ref 0.2–1.3)
BILIRUB SERPL-MCNC: 1.8 MG/DL (ref 0.2–1.3)
BILIRUB UR QL STRIP: NEGATIVE
BLD GP AB SCN SERPL QL: NORMAL
BLD PROD TYP BPU: NORMAL
BLD UNIT ID BPU: 0
BLD UNIT ID BPU: 0
BLOOD BANK CMNT PATIENT-IMP: NORMAL
BLOOD PRODUCT CODE: NORMAL
BLOOD PRODUCT CODE: NORMAL
BPU ID: NORMAL
BPU ID: NORMAL
BUN SERPL-MCNC: 11 MG/DL (ref 7–30)
BUN SERPL-MCNC: 11 MG/DL (ref 7–30)
BUN SERPL-MCNC: 12 MG/DL (ref 7–30)
BUN SERPL-MCNC: 13 MG/DL (ref 7–30)
BUN SERPL-MCNC: 14 MG/DL
BUN SERPL-MCNC: 15 MG/DL
BUN SERPL-MCNC: 16 MG/DL (ref 7–30)
BUN SERPL-MCNC: 18 MG/DL (ref 7–30)
BUN SERPL-MCNC: 19 MG/DL (ref 7–30)
BUN SERPL-MCNC: 22 MG/DL (ref 7–30)
BUN SERPL-MCNC: 22 MG/DL (ref 7–30)
BUN SERPL-MCNC: 23 MG/DL (ref 7–30)
BUN SERPL-MCNC: 24 MG/DL (ref 7–30)
BUN SERPL-MCNC: 25 MG/DL (ref 7–30)
BUN SERPL-MCNC: 26 MG/DL (ref 7–30)
BUN SERPL-MCNC: 26 MG/DL (ref 7–30)
BUN SERPL-MCNC: 28 MG/DL (ref 7–30)
BUN SERPL-MCNC: 29 MG/DL (ref 7–30)
BUN SERPL-MCNC: 30 MG/DL (ref 7–30)
BUN SERPL-MCNC: 30 MG/DL (ref 7–30)
BUN SERPL-MCNC: 32 MG/DL (ref 7–30)
BUN SERPL-MCNC: 32 MG/DL (ref 7–30)
BUN SERPL-MCNC: 34 MG/DL (ref 7–30)
BUN SERPL-MCNC: 37 MG/DL (ref 7–30)
BUN SERPL-MCNC: 43 MG/DL (ref 7–30)
BUPROPION QUAL: NEGATIVE
C DIFF TOX B STL QL: NEGATIVE
CA-I BLD-MCNC: 4.1 MG/DL (ref 4.4–5.2)
CA-I BLD-MCNC: 4.2 MG/DL (ref 4.4–5.2)
CAFFEINE QUAL: POSITIVE
CALCIUM SERPL-MCNC: 7.4 MG/DL (ref 8.5–10.1)
CALCIUM SERPL-MCNC: 7.6 MG/DL (ref 8.5–10.1)
CALCIUM SERPL-MCNC: 8 MG/DL (ref 8.5–10.1)
CALCIUM SERPL-MCNC: 8 MG/DL (ref 8.5–10.1)
CALCIUM SERPL-MCNC: 8.1 MG/DL (ref 8.5–10.1)
CALCIUM SERPL-MCNC: 8.2 MG/DL (ref 8.5–10.1)
CALCIUM SERPL-MCNC: 8.3 MG/DL (ref 8.5–10.1)
CALCIUM SERPL-MCNC: 8.4 MG/DL (ref 8.5–10.1)
CALCIUM SERPL-MCNC: 8.6 MG/DL (ref 8.5–10.1)
CALCIUM SERPL-MCNC: 8.7 MG/DL (ref 8.5–10.1)
CALCIUM SERPL-MCNC: 8.9 MG/DL
CALCIUM SERPL-MCNC: 9 MG/DL
CALCIUM SERPL-MCNC: 9 MG/DL (ref 8.5–10.1)
CALCIUM SERPL-MCNC: 9.1 MG/DL (ref 8.5–10.1)
CALCIUM SERPL-MCNC: 9.1 MG/DL (ref 8.5–10.1)
CALCIUM SERPL-MCNC: 9.2 MG/DL (ref 8.5–10.1)
CANNABINOIDS UR QL SCN: NEGATIVE
CARBAMAZEPINE QUAL: NEGATIVE
CHLORIDE SERPL-SCNC: 101 MMOL/L (ref 94–109)
CHLORIDE SERPL-SCNC: 102 MMOL/L (ref 94–109)
CHLORIDE SERPL-SCNC: 103 MMOL/L (ref 94–109)
CHLORIDE SERPL-SCNC: 105 MMOL/L (ref 94–109)
CHLORIDE SERPL-SCNC: 108 MMOL/L (ref 94–109)
CHLORIDE SERPL-SCNC: 109 MMOL/L (ref 94–109)
CHLORIDE SERPL-SCNC: 109 MMOL/L (ref 94–109)
CHLORIDE SERPL-SCNC: 110 MMOL/L (ref 94–109)
CHLORIDE SERPL-SCNC: 112 MMOL/L (ref 94–109)
CHLORIDE SERPL-SCNC: 113 MMOL/L (ref 94–109)
CHLORIDE SERPL-SCNC: 114 MMOL/L (ref 94–109)
CHLORIDE SERPL-SCNC: 117 MMOL/L (ref 94–109)
CHLORIDE SERPL-SCNC: 117 MMOL/L (ref 94–109)
CHLORIDE SERPL-SCNC: 96 MMOL/L (ref 94–109)
CHLORIDE SERPL-SCNC: 97 MMOL/L (ref 94–109)
CHLORIDE SERPL-SCNC: 98 MMOL/L (ref 94–109)
CHLORIDE SERPL-SCNC: 98 MMOL/L (ref 94–109)
CHLORIDE SERPL-SCNC: 99 MMOL/L (ref 94–109)
CHLORIDE SERPLBLD-SCNC: 100 MMOL/L
CHLORIDE SERPLBLD-SCNC: 101 MMOL/L
CHLORPHENIRAMINE: NEGATIVE
CHLORPROMAZINE: NEGATIVE
CI HYPERCOAGULATION INDEX: 0.2 RATIO (ref 0–3)
CI HYPERCOAGULATION INDEX: 1.8 RATIO (ref 0–3)
CI HYPOCOAGULATION INDEX: 0.7 RATIO (ref 0–3)
CI HYPOCOAGULATION INDEX: 0.8 RATIO (ref 0–3)
CITALOPRAM QUAL: NEGATIVE
CLOMIPRAMINE QUAL: NEGATIVE
CO2 SERPL-SCNC: 18 MMOL/L (ref 20–32)
CO2 SERPL-SCNC: 19 MMOL/L (ref 20–32)
CO2 SERPL-SCNC: 20 MMOL/L (ref 20–32)
CO2 SERPL-SCNC: 20 MMOL/L (ref 20–32)
CO2 SERPL-SCNC: 21 MMOL/L (ref 20–32)
CO2 SERPL-SCNC: 21 MMOL/L (ref 20–32)
CO2 SERPL-SCNC: 23 MMOL/L
CO2 SERPL-SCNC: 23 MMOL/L (ref 20–32)
CO2 SERPL-SCNC: 23 MMOL/L (ref 20–32)
CO2 SERPL-SCNC: 24 MMOL/L
CO2 SERPL-SCNC: 24 MMOL/L (ref 20–32)
CO2 SERPL-SCNC: 24 MMOL/L (ref 20–32)
CO2 SERPL-SCNC: 25 MMOL/L (ref 20–32)
CO2 SERPL-SCNC: 25 MMOL/L (ref 20–32)
CO2 SERPL-SCNC: 26 MMOL/L (ref 20–32)
CO2 SERPL-SCNC: 27 MMOL/L (ref 20–32)
CO2 SERPL-SCNC: 28 MMOL/L (ref 20–32)
CO2 SERPL-SCNC: 29 MMOL/L (ref 20–32)
CO2 SERPL-SCNC: 30 MMOL/L (ref 20–32)
CO2 SERPL-SCNC: 30 MMOL/L (ref 20–32)
COCAINE QUAL: NEGATIVE
COCAINE UR QL: NEGATIVE
COCAINE UR QL: NEGATIVE
CODEINE QUAL: NEGATIVE
COLOR UR AUTO: ABNORMAL
COLOR UR AUTO: YELLOW
COTININE SERPL-MCNC: <1 NG/ML
CREAT SERPL-MCNC: 0.77 MG/DL (ref 0.66–1.25)
CREAT SERPL-MCNC: 0.78 MG/DL (ref 0.66–1.25)
CREAT SERPL-MCNC: 0.8 MG/DL (ref 0.66–1.25)
CREAT SERPL-MCNC: 0.83 MG/DL (ref 0.66–1.25)
CREAT SERPL-MCNC: 0.83 MG/DL (ref 0.66–1.25)
CREAT SERPL-MCNC: 0.84 MG/DL (ref 0.66–1.25)
CREAT SERPL-MCNC: 0.89 MG/DL (ref 0.66–1.25)
CREAT SERPL-MCNC: 0.89 MG/DL (ref 0.66–1.25)
CREAT SERPL-MCNC: 0.9 MG/DL (ref 0.66–1.25)
CREAT SERPL-MCNC: 0.9 MG/DL (ref 0.66–1.25)
CREAT SERPL-MCNC: 0.91 MG/DL (ref 0.66–1.25)
CREAT SERPL-MCNC: 0.92 MG/DL (ref 0.66–1.25)
CREAT SERPL-MCNC: 0.97 MG/DL (ref 0.66–1.25)
CREAT SERPL-MCNC: 1 MG/DL (ref 0.66–1.25)
CREAT SERPL-MCNC: 1.01 MG/DL (ref 0.66–1.25)
CREAT SERPL-MCNC: 1.04 MG/DL (ref 0.66–1.25)
CREAT SERPL-MCNC: 1.07 MG/DL (ref 0.66–1.25)
CREAT SERPL-MCNC: 1.1 MG/DL
CREAT SERPL-MCNC: 1.16 MG/DL
CREAT SERPL-MCNC: 1.18 MG/DL (ref 0.66–1.25)
CREAT SERPL-MCNC: 1.26 MG/DL (ref 0.66–1.25)
CREAT SERPL-MCNC: 1.28 MG/DL (ref 0.66–1.25)
CREAT SERPL-MCNC: 1.37 MG/DL (ref 0.66–1.25)
CREAT SERPL-MCNC: 1.45 MG/DL (ref 0.66–1.25)
CREAT SERPL-MCNC: 1.45 MG/DL (ref 0.66–1.25)
CREAT SERPL-MCNC: 1.52 MG/DL (ref 0.66–1.25)
CREAT SERPL-MCNC: 1.59 MG/DL (ref 0.66–1.25)
CREAT UR-MCNC: 175 MG/DL
CRP SERPL-MCNC: 130 MG/L (ref 0–8)
CRP SERPL-MCNC: 150 MG/L (ref 0–8)
CRP SERPL-MCNC: 160 MG/L (ref 0–8)
CRP SERPL-MCNC: 190 MG/L (ref 0–8)
CRP SERPL-MCNC: 82 MG/L (ref 0–8)
D DIMER PPP FEU-MCNC: 1 UG/ML FEU (ref 0–0.5)
D DIMER PPP FEU-MCNC: 1 UG/ML FEU (ref 0–0.5)
D DIMER PPP FEU-MCNC: 1.1 UG/ML FEU (ref 0–0.5)
D DIMER PPP FEU-MCNC: 1.3 UG/ML FEU (ref 0–0.5)
D DIMER PPP FEU-MCNC: 1.3 UG/ML FEU (ref 0–0.5)
D DIMER PPP FEU-MCNC: 1.4 UG/ML FEU (ref 0–0.5)
D DIMER PPP FEU-MCNC: 1.7 UG/ML FEU (ref 0–0.5)
D DIMER PPP FEU-MCNC: 2.5 UG/ML FEU (ref 0–0.5)
D DIMER QUANTITATIVE (EXTERNAL): 1.27
DESIPRAMINE QUAL: NEGATIVE
DEXTROMETHORPHAN: NEGATIVE
DIFFERENTIAL METHOD BLD: ABNORMAL
DIFFERENTIAL METHOD BLD: NORMAL
DIPHENHYDRAMINE: NEGATIVE
DOXEPIN/METABOLITE: NEGATIVE
DOXYLAMINE: NEGATIVE
EBV VCA IGG SER QL IA: >8 AI (ref 0–0.8)
EOSINOPHIL # BLD AUTO: 0 10E9/L (ref 0–0.7)
EOSINOPHIL # BLD AUTO: 0.1 10E9/L (ref 0–0.7)
EOSINOPHIL NFR BLD AUTO: 0 %
EOSINOPHIL NFR BLD AUTO: 0.4 %
EOSINOPHIL NFR BLD AUTO: NORMAL %
EPHEDRINE OR PSEUDO: NEGATIVE
ERYTHROCYTE [DISTWIDTH] IN BLOOD BY AUTOMATED COUNT: 13.4 %
ERYTHROCYTE [DISTWIDTH] IN BLOOD BY AUTOMATED COUNT: 13.6 % (ref 10–15)
ERYTHROCYTE [DISTWIDTH] IN BLOOD BY AUTOMATED COUNT: 14 % (ref 10–15)
ERYTHROCYTE [DISTWIDTH] IN BLOOD BY AUTOMATED COUNT: 14 % (ref 10–15)
ERYTHROCYTE [DISTWIDTH] IN BLOOD BY AUTOMATED COUNT: 15 % (ref 10–15)
ERYTHROCYTE [DISTWIDTH] IN BLOOD BY AUTOMATED COUNT: 15.1 % (ref 10–15)
ERYTHROCYTE [DISTWIDTH] IN BLOOD BY AUTOMATED COUNT: 15.2 % (ref 10–15)
ERYTHROCYTE [DISTWIDTH] IN BLOOD BY AUTOMATED COUNT: 15.2 % (ref 10–15)
ERYTHROCYTE [DISTWIDTH] IN BLOOD BY AUTOMATED COUNT: 15.3 % (ref 10–15)
ERYTHROCYTE [DISTWIDTH] IN BLOOD BY AUTOMATED COUNT: 15.4 % (ref 10–15)
ERYTHROCYTE [DISTWIDTH] IN BLOOD BY AUTOMATED COUNT: 15.5 % (ref 10–15)
ERYTHROCYTE [DISTWIDTH] IN BLOOD BY AUTOMATED COUNT: 15.5 % (ref 10–15)
ERYTHROCYTE [DISTWIDTH] IN BLOOD BY AUTOMATED COUNT: 15.6 % (ref 10–15)
ERYTHROCYTE [DISTWIDTH] IN BLOOD BY AUTOMATED COUNT: 15.7 % (ref 10–15)
ERYTHROCYTE [DISTWIDTH] IN BLOOD BY AUTOMATED COUNT: 15.8 % (ref 10–15)
ERYTHROCYTE [DISTWIDTH] IN BLOOD BY AUTOMATED COUNT: 17.1 % (ref 10–15)
ERYTHROCYTE [DISTWIDTH] IN BLOOD BY AUTOMATED COUNT: 17.4 % (ref 10–15)
ERYTHROCYTE [DISTWIDTH] IN BLOOD BY AUTOMATED COUNT: 17.8 % (ref 10–15)
ERYTHROCYTE [DISTWIDTH] IN BLOOD BY AUTOMATED COUNT: 18 % (ref 10–15)
ERYTHROCYTE [DISTWIDTH] IN BLOOD BY AUTOMATED COUNT: 18.7 % (ref 10–15)
ERYTHROCYTE [DISTWIDTH] IN BLOOD BY AUTOMATED COUNT: 18.8 % (ref 10–15)
ETHANOL UR QL SCN: NEGATIVE
FACT VIII ACT/NOR PPP: 371 % (ref 55–200)
FENTANYL QUAL: NEGATIVE
FIBRINOGEN PPP-MCNC: 578 MG/DL (ref 200–420)
FIBRINOGEN PPP-MCNC: 646 MG/DL (ref 200–420)
FIBRINOGEN PPP-MCNC: 646 MG/DL (ref 200–420)
FIBRINOGEN PPP-MCNC: 691 MG/DL (ref 200–420)
FIBRINOGEN PPP-MCNC: 701 MG/DL (ref 200–420)
FIO2-PRE: 21 %
FLUOXETINE AND METAB: NEGATIVE
FRACT EXCRET NA UR+SERPL-RTO: 0.2 %
G ACTUAL CLOT STRENGTH: 15 KD/SC (ref 4.5–11)
G ACTUAL CLOT STRENGTH: 16.2 KD/SC (ref 4.5–11)
G ACTUAL CLOT STRENGTH: 16.6 KD/SC (ref 4.5–11)
G ACTUAL CLOT STRENGTH: 17.1 KD/SC (ref 4.5–11)
GFR SERPL CREATININE-BSD FRML MDRD: 45 ML/MIN/{1.73_M2}
GFR SERPL CREATININE-BSD FRML MDRD: 48 ML/MIN/{1.73_M2}
GFR SERPL CREATININE-BSD FRML MDRD: 50 ML/MIN/{1.73_M2}
GFR SERPL CREATININE-BSD FRML MDRD: 50 ML/MIN/{1.73_M2}
GFR SERPL CREATININE-BSD FRML MDRD: 54 ML/MIN/{1.73_M2}
GFR SERPL CREATININE-BSD FRML MDRD: 59 ML/MIN/{1.73_M2}
GFR SERPL CREATININE-BSD FRML MDRD: 60 ML/MIN/{1.73_M2}
GFR SERPL CREATININE-BSD FRML MDRD: 65 ML/MIN/{1.73_M2}
GFR SERPL CREATININE-BSD FRML MDRD: 66 ML/MIN/1.73M2
GFR SERPL CREATININE-BSD FRML MDRD: 67 ML/MIN/1.73M2
GFR SERPL CREATININE-BSD FRML MDRD: 73 ML/MIN/{1.73_M2}
GFR SERPL CREATININE-BSD FRML MDRD: 75 ML/MIN/{1.73_M2}
GFR SERPL CREATININE-BSD FRML MDRD: 78 ML/MIN/{1.73_M2}
GFR SERPL CREATININE-BSD FRML MDRD: 79 ML/MIN/{1.73_M2}
GFR SERPL CREATININE-BSD FRML MDRD: 82 ML/MIN/{1.73_M2}
GFR SERPL CREATININE-BSD FRML MDRD: 87 ML/MIN/{1.73_M2}
GFR SERPL CREATININE-BSD FRML MDRD: 88 ML/MIN/{1.73_M2}
GFR SERPL CREATININE-BSD FRML MDRD: 89 ML/MIN/{1.73_M2}
GFR SERPL CREATININE-BSD FRML MDRD: 89 ML/MIN/{1.73_M2}
GFR SERPL CREATININE-BSD FRML MDRD: 90 ML/MIN/{1.73_M2}
GFR SERPL CREATININE-BSD FRML MDRD: >90 ML/MIN/{1.73_M2}
GLUCOSE BLD-MCNC: 166 MG/DL (ref 70–99)
GLUCOSE BLDC GLUCOMTR-MCNC: 106 MG/DL (ref 70–99)
GLUCOSE BLDC GLUCOMTR-MCNC: 114 MG/DL (ref 70–99)
GLUCOSE BLDC GLUCOMTR-MCNC: 118 MG/DL (ref 70–99)
GLUCOSE BLDC GLUCOMTR-MCNC: 122 MG/DL (ref 70–99)
GLUCOSE BLDC GLUCOMTR-MCNC: 125 MG/DL (ref 70–99)
GLUCOSE BLDC GLUCOMTR-MCNC: 127 MG/DL (ref 70–99)
GLUCOSE BLDC GLUCOMTR-MCNC: 132 MG/DL (ref 70–99)
GLUCOSE BLDC GLUCOMTR-MCNC: 133 MG/DL (ref 70–99)
GLUCOSE BLDC GLUCOMTR-MCNC: 134 MG/DL (ref 70–99)
GLUCOSE BLDC GLUCOMTR-MCNC: 137 MG/DL (ref 70–99)
GLUCOSE BLDC GLUCOMTR-MCNC: 138 MG/DL (ref 70–99)
GLUCOSE BLDC GLUCOMTR-MCNC: 144 MG/DL (ref 70–99)
GLUCOSE BLDC GLUCOMTR-MCNC: 145 MG/DL (ref 70–99)
GLUCOSE BLDC GLUCOMTR-MCNC: 147 MG/DL (ref 70–99)
GLUCOSE BLDC GLUCOMTR-MCNC: 150 MG/DL (ref 70–99)
GLUCOSE BLDC GLUCOMTR-MCNC: 152 MG/DL (ref 70–99)
GLUCOSE BLDC GLUCOMTR-MCNC: 153 MG/DL (ref 70–99)
GLUCOSE BLDC GLUCOMTR-MCNC: 156 MG/DL (ref 70–99)
GLUCOSE BLDC GLUCOMTR-MCNC: 157 MG/DL (ref 70–99)
GLUCOSE BLDC GLUCOMTR-MCNC: 158 MG/DL (ref 70–99)
GLUCOSE BLDC GLUCOMTR-MCNC: 160 MG/DL (ref 70–99)
GLUCOSE BLDC GLUCOMTR-MCNC: 161 MG/DL (ref 70–99)
GLUCOSE BLDC GLUCOMTR-MCNC: 161 MG/DL (ref 70–99)
GLUCOSE BLDC GLUCOMTR-MCNC: 164 MG/DL (ref 70–99)
GLUCOSE BLDC GLUCOMTR-MCNC: 165 MG/DL (ref 70–99)
GLUCOSE BLDC GLUCOMTR-MCNC: 166 MG/DL (ref 70–99)
GLUCOSE BLDC GLUCOMTR-MCNC: 167 MG/DL (ref 70–99)
GLUCOSE BLDC GLUCOMTR-MCNC: 168 MG/DL (ref 70–99)
GLUCOSE BLDC GLUCOMTR-MCNC: 169 MG/DL (ref 70–99)
GLUCOSE BLDC GLUCOMTR-MCNC: 169 MG/DL (ref 70–99)
GLUCOSE BLDC GLUCOMTR-MCNC: 172 MG/DL (ref 70–99)
GLUCOSE BLDC GLUCOMTR-MCNC: 174 MG/DL (ref 70–99)
GLUCOSE BLDC GLUCOMTR-MCNC: 175 MG/DL (ref 70–99)
GLUCOSE BLDC GLUCOMTR-MCNC: 175 MG/DL (ref 70–99)
GLUCOSE BLDC GLUCOMTR-MCNC: 179 MG/DL (ref 70–99)
GLUCOSE BLDC GLUCOMTR-MCNC: 181 MG/DL (ref 70–99)
GLUCOSE BLDC GLUCOMTR-MCNC: 181 MG/DL (ref 70–99)
GLUCOSE BLDC GLUCOMTR-MCNC: 182 MG/DL (ref 70–99)
GLUCOSE BLDC GLUCOMTR-MCNC: 185 MG/DL (ref 70–99)
GLUCOSE BLDC GLUCOMTR-MCNC: 185 MG/DL (ref 70–99)
GLUCOSE BLDC GLUCOMTR-MCNC: 189 MG/DL (ref 70–99)
GLUCOSE BLDC GLUCOMTR-MCNC: 190 MG/DL (ref 70–99)
GLUCOSE BLDC GLUCOMTR-MCNC: 191 MG/DL (ref 70–99)
GLUCOSE BLDC GLUCOMTR-MCNC: 191 MG/DL (ref 70–99)
GLUCOSE BLDC GLUCOMTR-MCNC: 192 MG/DL (ref 70–99)
GLUCOSE BLDC GLUCOMTR-MCNC: 192 MG/DL (ref 70–99)
GLUCOSE BLDC GLUCOMTR-MCNC: 195 MG/DL (ref 70–99)
GLUCOSE BLDC GLUCOMTR-MCNC: 199 MG/DL (ref 70–99)
GLUCOSE BLDC GLUCOMTR-MCNC: 200 MG/DL (ref 70–99)
GLUCOSE BLDC GLUCOMTR-MCNC: 201 MG/DL (ref 70–99)
GLUCOSE BLDC GLUCOMTR-MCNC: 202 MG/DL (ref 70–99)
GLUCOSE BLDC GLUCOMTR-MCNC: 202 MG/DL (ref 70–99)
GLUCOSE BLDC GLUCOMTR-MCNC: 203 MG/DL (ref 70–99)
GLUCOSE BLDC GLUCOMTR-MCNC: 205 MG/DL (ref 70–99)
GLUCOSE BLDC GLUCOMTR-MCNC: 206 MG/DL (ref 70–99)
GLUCOSE BLDC GLUCOMTR-MCNC: 207 MG/DL (ref 70–99)
GLUCOSE BLDC GLUCOMTR-MCNC: 209 MG/DL (ref 70–99)
GLUCOSE BLDC GLUCOMTR-MCNC: 211 MG/DL (ref 70–99)
GLUCOSE BLDC GLUCOMTR-MCNC: 211 MG/DL (ref 70–99)
GLUCOSE BLDC GLUCOMTR-MCNC: 212 MG/DL (ref 70–99)
GLUCOSE BLDC GLUCOMTR-MCNC: 216 MG/DL (ref 70–99)
GLUCOSE BLDC GLUCOMTR-MCNC: 217 MG/DL (ref 70–99)
GLUCOSE BLDC GLUCOMTR-MCNC: 218 MG/DL (ref 70–99)
GLUCOSE BLDC GLUCOMTR-MCNC: 222 MG/DL (ref 70–99)
GLUCOSE BLDC GLUCOMTR-MCNC: 223 MG/DL (ref 70–99)
GLUCOSE BLDC GLUCOMTR-MCNC: 225 MG/DL (ref 70–99)
GLUCOSE BLDC GLUCOMTR-MCNC: 227 MG/DL (ref 70–99)
GLUCOSE BLDC GLUCOMTR-MCNC: 230 MG/DL (ref 70–99)
GLUCOSE BLDC GLUCOMTR-MCNC: 233 MG/DL (ref 70–99)
GLUCOSE BLDC GLUCOMTR-MCNC: 233 MG/DL (ref 70–99)
GLUCOSE BLDC GLUCOMTR-MCNC: 238 MG/DL (ref 70–99)
GLUCOSE BLDC GLUCOMTR-MCNC: 238 MG/DL (ref 70–99)
GLUCOSE BLDC GLUCOMTR-MCNC: 240 MG/DL (ref 70–99)
GLUCOSE BLDC GLUCOMTR-MCNC: 246 MG/DL (ref 70–99)
GLUCOSE BLDC GLUCOMTR-MCNC: 247 MG/DL (ref 70–99)
GLUCOSE BLDC GLUCOMTR-MCNC: 254 MG/DL (ref 70–99)
GLUCOSE BLDC GLUCOMTR-MCNC: 256 MG/DL (ref 70–99)
GLUCOSE BLDC GLUCOMTR-MCNC: 257 MG/DL (ref 70–99)
GLUCOSE BLDC GLUCOMTR-MCNC: 260 MG/DL (ref 70–99)
GLUCOSE BLDC GLUCOMTR-MCNC: 266 MG/DL (ref 70–99)
GLUCOSE BLDC GLUCOMTR-MCNC: 272 MG/DL (ref 70–99)
GLUCOSE BLDC GLUCOMTR-MCNC: 273 MG/DL (ref 70–99)
GLUCOSE BLDC GLUCOMTR-MCNC: 285 MG/DL (ref 70–99)
GLUCOSE BLDC GLUCOMTR-MCNC: 290 MG/DL (ref 70–99)
GLUCOSE BLDC GLUCOMTR-MCNC: 295 MG/DL (ref 70–99)
GLUCOSE BLDC GLUCOMTR-MCNC: 89 MG/DL (ref 70–99)
GLUCOSE SERPL-MCNC: 110 MG/DL (ref 70–99)
GLUCOSE SERPL-MCNC: 114 MG/DL (ref 70–99)
GLUCOSE SERPL-MCNC: 123 MG/DL (ref 70–99)
GLUCOSE SERPL-MCNC: 126 MG/DL (ref 70–99)
GLUCOSE SERPL-MCNC: 126 MG/DL (ref 70–99)
GLUCOSE SERPL-MCNC: 132 MG/DL (ref 70–99)
GLUCOSE SERPL-MCNC: 141 MG/DL (ref 70–99)
GLUCOSE SERPL-MCNC: 147 MG/DL (ref 70–99)
GLUCOSE SERPL-MCNC: 154 MG/DL (ref 70–99)
GLUCOSE SERPL-MCNC: 159 MG/DL (ref 70–99)
GLUCOSE SERPL-MCNC: 162 MG/DL (ref 70–99)
GLUCOSE SERPL-MCNC: 166 MG/DL (ref 70–99)
GLUCOSE SERPL-MCNC: 169 MG/DL (ref 70–99)
GLUCOSE SERPL-MCNC: 172 MG/DL (ref 70–99)
GLUCOSE SERPL-MCNC: 173 MG/DL (ref 70–99)
GLUCOSE SERPL-MCNC: 180 MG/DL (ref 70–99)
GLUCOSE SERPL-MCNC: 183 MG/DL (ref 70–99)
GLUCOSE SERPL-MCNC: 186 MG/DL (ref 70–99)
GLUCOSE SERPL-MCNC: 189 MG/DL (ref 70–99)
GLUCOSE SERPL-MCNC: 193 MG/DL (ref 70–99)
GLUCOSE SERPL-MCNC: 213 MG/DL (ref 70–99)
GLUCOSE SERPL-MCNC: 225 MG/DL (ref 70–99)
GLUCOSE SERPL-MCNC: 227 MG/DL (ref 70–99)
GLUCOSE SERPL-MCNC: 254 MG/DL (ref 70–99)
GLUCOSE SERPL-MCNC: 271 MG/DL (ref 70–99)
GLUCOSE SERPL-MCNC: 274 MG/DL (ref 70–99)
GLUCOSE SERPL-MCNC: 95 MG/DL (ref 70–99)
GLUCOSE UR STRIP-MCNC: >1000 MG/DL
GLUCOSE UR STRIP-MCNC: NEGATIVE MG/DL
GRAM STN SPEC: ABNORMAL
GRAM STN SPEC: ABNORMAL
GRAM STN SPEC: NORMAL
HBA1C MFR BLD: 5.5 % (ref 0–5.6)
HBA1C MFR BLD: 5.9 % (ref 0–5.6)
HCO3 BLD-SCNC: 15 MMOL/L (ref 21–28)
HCO3 BLD-SCNC: 17 MMOL/L (ref 21–28)
HCO3 BLD-SCNC: 19 MMOL/L (ref 21–28)
HCO3 BLD-SCNC: 21 MMOL/L (ref 21–28)
HCO3 BLD-SCNC: 21 MMOL/L (ref 21–28)
HCO3 BLD-SCNC: 23 MMOL/L (ref 21–28)
HCO3 BLD-SCNC: 24 MMOL/L (ref 21–28)
HCO3 BLD-SCNC: 24 MMOL/L (ref 21–28)
HCO3 BLD-SCNC: 25 MMOL/L (ref 21–28)
HCO3 BLD-SCNC: 26 MMOL/L (ref 21–28)
HCO3 BLD-SCNC: 26 MMOL/L (ref 21–28)
HCO3 BLD-SCNC: 27 MMOL/L (ref 21–28)
HCO3 BLD-SCNC: 28 MMOL/L (ref 21–28)
HCO3 BLD-SCNC: 29 MMOL/L (ref 21–28)
HCO3 BLD-SCNC: 30 MMOL/L (ref 21–28)
HCO3 BLD-SCNC: 31 MMOL/L (ref 21–28)
HCO3 BLD-SCNC: 32 MMOL/L (ref 21–28)
HCO3 BLD-SCNC: 32 MMOL/L (ref 21–28)
HCO3 BLD-SCNC: 33 MMOL/L (ref 21–28)
HCO3 BLD-SCNC: 33 MMOL/L (ref 21–28)
HCO3 BLDV-SCNC: 18 MMOL/L (ref 21–28)
HCO3 BLDV-SCNC: 24 MMOL/L (ref 21–28)
HCO3 BLDV-SCNC: 24 MMOL/L (ref 21–28)
HCO3 BLDV-SCNC: 25 MMOL/L (ref 21–28)
HCO3 BLDV-SCNC: 25 MMOL/L (ref 21–28)
HCO3 BLDV-SCNC: 26 MMOL/L (ref 21–28)
HCO3 BLDV-SCNC: 27 MMOL/L (ref 21–28)
HCO3 BLDV-SCNC: 28 MMOL/L (ref 21–28)
HCO3 BLDV-SCNC: 28 MMOL/L (ref 21–28)
HCO3 BLDV-SCNC: 29 MMOL/L (ref 21–28)
HCO3 BLDV-SCNC: 30 MMOL/L (ref 21–28)
HCO3 BLDV-SCNC: 31 MMOL/L (ref 21–28)
HCO3 BLDV-SCNC: 32 MMOL/L (ref 21–28)
HCO3 BLDV-SCNC: 33 MMOL/L (ref 21–28)
HCO3 BLDV-SCNC: 33 MMOL/L (ref 21–28)
HCO3 BLDV-SCNC: 34 MMOL/L (ref 21–28)
HCO3 BLDV-SCNC: 34 MMOL/L (ref 21–28)
HCO3 BLDV-SCNC: 35 MMOL/L (ref 21–28)
HCO3 BLDV-SCNC: 35 MMOL/L (ref 21–28)
HCO3 BLDV-SCNC: 36 MMOL/L (ref 21–28)
HCO3 BLDV-SCNC: 36 MMOL/L (ref 21–28)
HCO3 BLDV-SCNC: 37 MMOL/L (ref 21–28)
HCO3 BLDV-SCNC: 8 MMOL/L (ref 21–28)
HCO3 BLDV-SCNC: NORMAL MMOL/L (ref 21–28)
HCT VFR BLD AUTO: 18.5 % (ref 40–53)
HCT VFR BLD AUTO: 22.3 % (ref 40–53)
HCT VFR BLD AUTO: 24.1 % (ref 40–53)
HCT VFR BLD AUTO: 24.9 % (ref 40–53)
HCT VFR BLD AUTO: 25 % (ref 40–53)
HCT VFR BLD AUTO: 25.2 % (ref 40–53)
HCT VFR BLD AUTO: 25.6 % (ref 40–53)
HCT VFR BLD AUTO: 26.1 % (ref 40–53)
HCT VFR BLD AUTO: 27.5 % (ref 40–53)
HCT VFR BLD AUTO: 31.3 % (ref 40–53)
HCT VFR BLD AUTO: 34.3 % (ref 40–53)
HCT VFR BLD AUTO: 35.8 % (ref 40–53)
HCT VFR BLD AUTO: 36.2 % (ref 40–53)
HCT VFR BLD AUTO: 39.1 % (ref 40–53)
HCT VFR BLD AUTO: 39.3 % (ref 40–53)
HCT VFR BLD AUTO: 41.2 % (ref 40–53)
HCT VFR BLD AUTO: 41.9 % (ref 40–53)
HCT VFR BLD AUTO: 43.5 % (ref 40–53)
HCT VFR BLD AUTO: 43.5 % (ref 40–53)
HCT VFR BLD AUTO: 45.2 %
HCT VFR BLD AUTO: 45.4 % (ref 40–53)
HCT VFR BLD AUTO: 46.5 % (ref 40–53)
HCT VFR BLD AUTO: 47.2 % (ref 40–53)
HCT VFR BLD AUTO: 47.9 % (ref 40–53)
HCT VFR BLD AUTO: 49.4 % (ref 40–53)
HCT VFR BLD AUTO: 49.7 % (ref 40–53)
HCT VFR BLD AUTO: 50.7 % (ref 40–53)
HCV AB SERPL QL IA: NONREACTIVE
HEMOGLOBIN: 15.5 G/DL (ref 13.3–17.7)
HGB BLD-MCNC: 10.3 G/DL (ref 13.3–17.7)
HGB BLD-MCNC: 11.2 G/DL (ref 13.3–17.7)
HGB BLD-MCNC: 11.8 G/DL (ref 13.3–17.7)
HGB BLD-MCNC: 12 G/DL (ref 13.3–17.7)
HGB BLD-MCNC: 12.6 G/DL (ref 13.3–17.7)
HGB BLD-MCNC: 12.7 G/DL (ref 13.3–17.7)
HGB BLD-MCNC: 13.4 G/DL (ref 13.3–17.7)
HGB BLD-MCNC: 13.9 G/DL (ref 13.3–17.7)
HGB BLD-MCNC: 14.1 G/DL (ref 13.3–17.7)
HGB BLD-MCNC: 14.3 G/DL (ref 13.3–17.7)
HGB BLD-MCNC: 14.8 G/DL (ref 13.3–17.7)
HGB BLD-MCNC: 15 G/DL (ref 13.3–17.7)
HGB BLD-MCNC: 15.8 G/DL (ref 13.3–17.7)
HGB BLD-MCNC: 15.9 G/DL (ref 13.3–17.7)
HGB BLD-MCNC: 16.2 G/DL (ref 13.3–17.7)
HGB BLD-MCNC: 16.7 G/DL (ref 13.3–17.7)
HGB BLD-MCNC: 16.9 G/DL (ref 13.3–17.7)
HGB BLD-MCNC: 17.2 G/DL (ref 13.3–17.7)
HGB BLD-MCNC: 5.9 G/DL (ref 13.3–17.7)
HGB BLD-MCNC: 7.4 G/DL (ref 13.3–17.7)
HGB BLD-MCNC: 7.7 G/DL (ref 13.3–17.7)
HGB BLD-MCNC: 7.7 G/DL (ref 13.3–17.7)
HGB BLD-MCNC: 8 G/DL (ref 13.3–17.7)
HGB BLD-MCNC: 8 G/DL (ref 13.3–17.7)
HGB BLD-MCNC: 8.3 G/DL (ref 13.3–17.7)
HGB BLD-MCNC: 9 G/DL (ref 13.3–17.7)
HGB UR QL STRIP: ABNORMAL
HGB UR QL STRIP: NEGATIVE
HGB UR QL STRIP: NEGATIVE
HIV 1+2 AB+HIV1 P24 AG SERPL QL IA: NONREACTIVE
HIV 1+2 AB+HIV1 P24 AG SERPL QL IA: NONREACTIVE
HYALINE CASTS #/AREA URNS LPF: 36 /LPF (ref 0–2)
HYALINE CASTS #/AREA URNS LPF: 4 /LPF (ref 0–2)
HYALINE CASTS #/AREA URNS LPF: 5 /LPF (ref 0–2)
HYDROCODONE QUAL: NEGATIVE
HYDROMORPHONE QUAL: NEGATIVE
IBUPROFEN QUAL: NEGATIVE
IL6 SERPL-MCNC: 34.41 PG/ML
IL6 SERPL-MCNC: 47.94 PG/ML
IMIPRAMINE QUAL: NEGATIVE
IMM GRANULOCYTES # BLD: 0.1 10E9/L (ref 0–0.4)
IMM GRANULOCYTES # BLD: 0.1 10E9/L (ref 0–0.4)
IMM GRANULOCYTES # BLD: 0.3 10E9/L (ref 0–0.4)
IMM GRANULOCYTES # BLD: 0.6 10E9/L (ref 0–0.4)
IMM GRANULOCYTES # BLD: 0.9 10E9/L (ref 0–0.4)
IMM GRANULOCYTES NFR BLD: 1 %
IMM GRANULOCYTES NFR BLD: 1.3 %
IMM GRANULOCYTES NFR BLD: 1.4 %
IMM GRANULOCYTES NFR BLD: 4.6 %
IMM GRANULOCYTES NFR BLD: 4.8 %
INR PPP: 1.9 (ref 0.9–1.1)
INR PPP: 2.1 (ref 0.9–1.1)
INR PPP: 2.1 (ref 0.9–1.1)
INR PPP: 2.15 (ref 0.86–1.14)
INR PPP: 2.22 (ref 0.9–1.1)
INR PPP: 2.3 (ref 0.9–1.1)
INR PPP: 2.43 (ref 0.86–1.14)
INR PPP: 2.6 (ref 0.9–1.1)
INR PPP: 2.62 (ref 0.86–1.14)
INR PPP: 2.7 (ref 0.9–1.1)
INR PPP: 2.81 (ref 0.86–1.14)
INR PPP: 2.9 (ref 0.9–1.1)
INR PPP: 3 (ref 0.9–1.1)
INR PPP: 3 (ref 0.9–1.1)
INR PPP: 3.1 (ref 0.9–1.1)
INR PPP: 3.1 (ref 0.9–1.1)
INR PPP: 3.4 (ref 0.9–1.1)
INR PPP: 3.5 (ref 0.9–1.1)
INR PPP: 3.7 (ref 0.9–1.1)
INR PPP: 3.9 (ref 0.9–1.1)
INR PPP: 3.9 (ref 0.9–1.1)
INR PPP: 3.92 (ref 0.86–1.14)
INR PPP: 4.1 (ref 0.9–1.1)
INR PPP: 4.25 (ref 0.86–1.14)
INR PPP: 5.29 (ref 0.86–1.14)
INR PPP: 5.32 (ref 0.86–1.14)
INR PPP: 5.76 (ref 0.86–1.14)
INR PPP: 6.63 (ref 0.86–1.14)
INTERPRETATION ECG - MUSE: NORMAL
K TIME TO SPEC CLOT STRENGTH: 0.8 MINUTE (ref 1–3)
K TIME TO SPEC CLOT STRENGTH: 0.8 MINUTE (ref 1–3)
K TIME TO SPEC CLOT STRENGTH: 1 MINUTE (ref 1–3)
K TIME TO SPEC CLOT STRENGTH: 2 MINUTE (ref 1–3)
KETAMINE QUAL: NEGATIVE
KETONES UR STRIP-MCNC: NEGATIVE MG/DL
L PNEUMO1 AG UR QL IA: NORMAL
LABORATORY COMMENT REPORT: ABNORMAL
LACTATE BLD-SCNC: 1.5 MMOL/L (ref 0.7–2)
LACTATE BLD-SCNC: 1.5 MMOL/L (ref 0.7–2)
LACTATE BLD-SCNC: 1.7 MMOL/L (ref 0.7–2)
LACTATE BLD-SCNC: 1.8 MMOL/L (ref 0.7–2)
LACTATE BLD-SCNC: 2.1 MMOL/L (ref 0.7–2)
LACTATE BLD-SCNC: 2.3 MMOL/L (ref 0.7–2)
LACTATE BLD-SCNC: 2.6 MMOL/L (ref 0.7–2)
LACTATE BLD-SCNC: 3 MMOL/L (ref 0.7–2)
LACTATE BLD-SCNC: 3.4 MMOL/L (ref 0.7–2)
LACTATE BLD-SCNC: 4.5 MMOL/L (ref 0.7–2)
LACTATE BLD-SCNC: 4.6 MMOL/L (ref 0.7–2)
LACTATE BLD-SCNC: 4.9 MMOL/L (ref 0.7–2)
LACTATE BLD-SCNC: 5.5 MMOL/L (ref 0.7–2)
LAMOTRIGINE QUAL: NEGATIVE
LDH SERPL L TO P-CCNC: 231 U/L (ref 85–227)
LDH SERPL L TO P-CCNC: 234 U/L (ref 85–227)
LDH SERPL L TO P-CCNC: 252 U/L (ref 85–227)
LDH SERPL L TO P-CCNC: 375 U/L (ref 85–227)
LDH SERPL L TO P-CCNC: 459 U/L (ref 85–227)
LDH SERPL L TO P-CCNC: 466 U/L (ref 85–227)
LDH SERPL L TO P-CCNC: 471 U/L (ref 85–227)
LDH SERPL L TO P-CCNC: 476 U/L (ref 85–227)
LDH SERPL L TO P-CCNC: 497 U/L (ref 85–227)
LDH SERPL L TO P-CCNC: 506 U/L (ref 85–227)
LDH SERPL L TO P-CCNC: 525 U/L (ref 85–227)
LDH SERPL L TO P-CCNC: 538 U/L (ref 85–227)
LDH SERPL L TO P-CCNC: 540 U/L (ref 85–227)
LDH SERPL L TO P-CCNC: 543 U/L (ref 85–227)
LDH SERPL L TO P-CCNC: 551 U/L (ref 85–227)
LDH SERPL L TO P-CCNC: 582 U/L (ref 105–230)
LDH SERPL L TO P-CCNC: 588 U/L (ref 85–227)
LDH SERPL L TO P-CCNC: 675 U/L (ref 85–227)
LDH SERPL L TO P-CCNC: 748 U/L (ref 85–227)
LDH SERPL L TO P-CCNC: 995 U/L (ref 85–227)
LDH SERPL-CCNC: 203 U/L
LDH1 CFR SERPL ELPH: 24 % (ref 14–27)
LDH2 CFR SERPL ELPH: 36 % (ref 29–42)
LDH3 CFR SERPL ELPH: 14 % (ref 18–30)
LDH4 CFR SERPL ELPH: 7 % (ref 8–15)
LDH5 CFR SERPL ELPH: 19 % (ref 6–23)
LEUKOCYTE ESTERASE UR QL STRIP: NEGATIVE
LIDOCAIN SPEC QL: NEGATIVE
LIPASE SERPL-CCNC: 116 U/L (ref 73–393)
LIPASE SERPL-CCNC: 1262 U/L (ref 73–393)
LIPASE SERPL-CCNC: 133 U/L (ref 73–393)
LOXAPINE: NEGATIVE
LY30 LYSIS AT 30 MINUTES: 0 % (ref 0–8)
LY60 LYSIS AT 60 MINUTES: 0 % (ref 0–15)
LY60 LYSIS AT 60 MINUTES: 0 % (ref 0–15)
LY60 LYSIS AT 60 MINUTES: 0.2 % (ref 0–15)
LY60 LYSIS AT 60 MINUTES: 0.6 % (ref 0–15)
LYMPHOCYTES # BLD AUTO: 0.7 10E9/L (ref 0.8–5.3)
LYMPHOCYTES # BLD AUTO: 0.8 10E9/L (ref 0.8–5.3)
LYMPHOCYTES # BLD AUTO: 0.9 10E9/L (ref 0.8–5.3)
LYMPHOCYTES # BLD AUTO: 1.1 10E9/L (ref 0.8–5.3)
LYMPHOCYTES # BLD AUTO: 1.3 10E9/L (ref 0.8–5.3)
LYMPHOCYTES NFR BLD AUTO: 11.7 %
LYMPHOCYTES NFR BLD AUTO: 4.5 %
LYMPHOCYTES NFR BLD AUTO: 5.5 %
LYMPHOCYTES NFR BLD AUTO: 6.7 %
LYMPHOCYTES NFR BLD AUTO: 8 %
LYMPHOCYTES NFR BLD AUTO: NORMAL %
Lab: ABNORMAL
Lab: ABNORMAL
Lab: NORMAL
MA MAXIMUM CLOT STRENGTH: 75 MM (ref 50–70)
MA MAXIMUM CLOT STRENGTH: 76.4 MM (ref 50–70)
MA MAXIMUM CLOT STRENGTH: 76.9 MM (ref 50–70)
MA MAXIMUM CLOT STRENGTH: 77.4 MM (ref 50–70)
MAGNESIUM SERPL-MCNC: 1.7 MG/DL (ref 1.6–2.3)
MAGNESIUM SERPL-MCNC: 1.8 MG/DL (ref 1.6–2.3)
MAGNESIUM SERPL-MCNC: 1.8 MG/DL (ref 1.6–2.3)
MAGNESIUM SERPL-MCNC: 2 MG/DL (ref 1.6–2.3)
MAGNESIUM SERPL-MCNC: 2.2 MG/DL (ref 1.6–2.3)
MAGNESIUM SERPL-MCNC: 2.3 MG/DL (ref 1.6–2.3)
MAGNESIUM SERPL-MCNC: 2.3 MG/DL (ref 1.6–2.3)
MAGNESIUM SERPL-MCNC: 2.4 MG/DL (ref 1.6–2.3)
MAGNESIUM SERPL-MCNC: 2.5 MG/DL (ref 1.6–2.3)
MAPROTYLINE: NEGATIVE
MCH RBC QN AUTO: 31.6 PG (ref 26.5–33)
MCH RBC QN AUTO: 31.6 PG (ref 26.5–33)
MCH RBC QN AUTO: 31.8 PG (ref 26.5–33)
MCH RBC QN AUTO: 31.9 PG (ref 26.5–33)
MCH RBC QN AUTO: 32.1 PG (ref 26.5–33)
MCH RBC QN AUTO: 32.2 PG (ref 26.5–33)
MCH RBC QN AUTO: 32.3 PG (ref 26.5–33)
MCH RBC QN AUTO: 32.4 PG (ref 26.5–33)
MCH RBC QN AUTO: 32.6 PG (ref 26.5–33)
MCH RBC QN AUTO: 32.8 PG
MCH RBC QN AUTO: 32.8 PG (ref 26.5–33)
MCH RBC QN AUTO: 32.8 PG (ref 26.5–33)
MCH RBC QN AUTO: 33.1 PG (ref 26.5–33)
MCH RBC QN AUTO: 33.2 PG (ref 26.5–33)
MCH RBC QN AUTO: 33.3 PG (ref 26.5–33)
MCH RBC QN AUTO: 33.5 PG (ref 26.5–33)
MCH RBC QN AUTO: 33.7 PG (ref 26.5–33)
MCH RBC QN AUTO: 34.1 PG (ref 26.5–33)
MCHC RBC AUTO-ENTMCNC: 30.9 G/DL (ref 31.5–36.5)
MCHC RBC AUTO-ENTMCNC: 31.3 G/DL (ref 31.5–36.5)
MCHC RBC AUTO-ENTMCNC: 31.7 G/DL (ref 31.5–36.5)
MCHC RBC AUTO-ENTMCNC: 31.8 G/DL (ref 31.5–36.5)
MCHC RBC AUTO-ENTMCNC: 31.9 G/DL (ref 31.5–36.5)
MCHC RBC AUTO-ENTMCNC: 32 G/DL (ref 31.5–36.5)
MCHC RBC AUTO-ENTMCNC: 32.1 G/DL (ref 31.5–36.5)
MCHC RBC AUTO-ENTMCNC: 32.4 G/DL (ref 31.5–36.5)
MCHC RBC AUTO-ENTMCNC: 32.5 G/DL (ref 31.5–36.5)
MCHC RBC AUTO-ENTMCNC: 32.5 G/DL (ref 31.5–36.5)
MCHC RBC AUTO-ENTMCNC: 32.6 G/DL (ref 31.5–36.5)
MCHC RBC AUTO-ENTMCNC: 32.6 G/DL (ref 31.5–36.5)
MCHC RBC AUTO-ENTMCNC: 32.7 G/DL (ref 31.5–36.5)
MCHC RBC AUTO-ENTMCNC: 32.7 G/DL (ref 31.5–36.5)
MCHC RBC AUTO-ENTMCNC: 32.9 G/DL (ref 31.5–36.5)
MCHC RBC AUTO-ENTMCNC: 32.9 G/DL (ref 31.5–36.5)
MCHC RBC AUTO-ENTMCNC: 33.2 G/DL (ref 31.5–36.5)
MCHC RBC AUTO-ENTMCNC: 33.5 G/DL (ref 31.5–36.5)
MCHC RBC AUTO-ENTMCNC: 33.8 G/DL (ref 31.5–36.5)
MCHC RBC AUTO-ENTMCNC: 33.9 G/DL (ref 31.5–36.5)
MCHC RBC AUTO-ENTMCNC: 34 G/DL (ref 31.5–36.5)
MCHC RBC AUTO-ENTMCNC: 34 G/DL (ref 31.5–36.5)
MCHC RBC AUTO-ENTMCNC: 34.3 G/DL
MCHC RBC AUTO-ENTMCNC: 34.3 G/DL (ref 31.5–36.5)
MCV RBC AUTO: 100 FL (ref 78–100)
MCV RBC AUTO: 101 FL (ref 78–100)
MCV RBC AUTO: 102 FL (ref 78–100)
MCV RBC AUTO: 102 FL (ref 78–100)
MCV RBC AUTO: 103 FL (ref 78–100)
MCV RBC AUTO: 103 FL (ref 78–100)
MCV RBC AUTO: 104 FL (ref 78–100)
MCV RBC AUTO: 106 FL (ref 78–100)
MCV RBC AUTO: 95.8 FL
MCV RBC AUTO: 97 FL (ref 78–100)
MCV RBC AUTO: 98 FL (ref 78–100)
MCV RBC AUTO: 99 FL (ref 78–100)
MDC_IDC_EPISODE_DTM: NORMAL
MDC_IDC_EPISODE_DURATION: 107 S
MDC_IDC_EPISODE_DURATION: 12 S
MDC_IDC_EPISODE_DURATION: 13 S
MDC_IDC_EPISODE_DURATION: 14 S
MDC_IDC_EPISODE_DURATION: 15 S
MDC_IDC_EPISODE_DURATION: 15 S
MDC_IDC_EPISODE_DURATION: 152 S
MDC_IDC_EPISODE_DURATION: 17 S
MDC_IDC_EPISODE_DURATION: 17 S
MDC_IDC_EPISODE_DURATION: 18 S
MDC_IDC_EPISODE_DURATION: 18 S
MDC_IDC_EPISODE_DURATION: 19 S
MDC_IDC_EPISODE_DURATION: 25 S
MDC_IDC_EPISODE_DURATION: 26 S
MDC_IDC_EPISODE_DURATION: 26 S
MDC_IDC_EPISODE_DURATION: 31 S
MDC_IDC_EPISODE_DURATION: 380 S
MDC_IDC_EPISODE_DURATION: 40 S
MDC_IDC_EPISODE_DURATION: 41 S
MDC_IDC_EPISODE_DURATION: 439 S
MDC_IDC_EPISODE_DURATION: 44 S
MDC_IDC_EPISODE_DURATION: 58 S
MDC_IDC_EPISODE_DURATION: 64 S
MDC_IDC_EPISODE_DURATION: 65 S
MDC_IDC_EPISODE_DURATION: 65 S
MDC_IDC_EPISODE_DURATION: 68 S
MDC_IDC_EPISODE_DURATION: 71 S
MDC_IDC_EPISODE_DURATION: 86 S
MDC_IDC_EPISODE_ID: NORMAL
MDC_IDC_EPISODE_TYPE: NORMAL
MDC_IDC_EPISODE_VENDOR_TYPE: NORMAL
MDC_IDC_LEAD_IMPLANT_DT: NORMAL
MDC_IDC_LEAD_IMPLANT_DT: NORMAL
MDC_IDC_LEAD_LOCATION: NORMAL
MDC_IDC_LEAD_LOCATION: NORMAL
MDC_IDC_LEAD_LOCATION_DETAIL_1: NORMAL
MDC_IDC_LEAD_LOCATION_DETAIL_1: NORMAL
MDC_IDC_LEAD_MFG: NORMAL
MDC_IDC_LEAD_MFG: NORMAL
MDC_IDC_LEAD_MODEL: NORMAL
MDC_IDC_LEAD_MODEL: NORMAL
MDC_IDC_LEAD_POLARITY_TYPE: NORMAL
MDC_IDC_LEAD_POLARITY_TYPE: NORMAL
MDC_IDC_LEAD_SERIAL: NORMAL
MDC_IDC_LEAD_SERIAL: NORMAL
MDC_IDC_MSMT_BATTERY_DTM: NORMAL
MDC_IDC_MSMT_BATTERY_DTM: NORMAL
MDC_IDC_MSMT_BATTERY_REMAINING_LONGEVITY: 144 MO
MDC_IDC_MSMT_BATTERY_REMAINING_LONGEVITY: 144 MO
MDC_IDC_MSMT_BATTERY_REMAINING_PERCENTAGE: 100 %
MDC_IDC_MSMT_BATTERY_STATUS: NORMAL
MDC_IDC_MSMT_BATTERY_STATUS: NORMAL
MDC_IDC_MSMT_CAP_CHARGE_DTM: NORMAL
MDC_IDC_MSMT_CAP_CHARGE_DTM: NORMAL
MDC_IDC_MSMT_CAP_CHARGE_ENERGY: 0 J
MDC_IDC_MSMT_CAP_CHARGE_TIME: 0 S
MDC_IDC_MSMT_CAP_CHARGE_TIME: 10.45
MDC_IDC_MSMT_CAP_CHARGE_TIME: 10.5 S
MDC_IDC_MSMT_CAP_CHARGE_TYPE: NORMAL
MDC_IDC_MSMT_LEADCHNL_RV_IMPEDANCE_VALUE: 395 OHM
MDC_IDC_MSMT_LEADCHNL_RV_IMPEDANCE_VALUE: 423 OHM
MDC_IDC_MSMT_LEADCHNL_RV_LEAD_CHANNEL_STATUS: NORMAL
MDC_IDC_MSMT_LEADCHNL_RV_PACING_THRESHOLD_AMPLITUDE: 1.8 V
MDC_IDC_MSMT_LEADCHNL_RV_PACING_THRESHOLD_PULSEWIDTH: 0.5 MS
MDC_IDC_MSMT_LEADCHNL_RV_SENSING_INTR_AMPL: 6.1 MV
MDC_IDC_PG_IMPLANT_DTM: NORMAL
MDC_IDC_PG_IMPLANT_DTM: NORMAL
MDC_IDC_PG_MFG: NORMAL
MDC_IDC_PG_MFG: NORMAL
MDC_IDC_PG_MODEL: NORMAL
MDC_IDC_PG_MODEL: NORMAL
MDC_IDC_PG_SERIAL: NORMAL
MDC_IDC_PG_SERIAL: NORMAL
MDC_IDC_PG_TYPE: NORMAL
MDC_IDC_PG_TYPE: NORMAL
MDC_IDC_SESS_CLINIC_NAME: NORMAL
MDC_IDC_SESS_CLINIC_NAME: NORMAL
MDC_IDC_SESS_DTM: NORMAL
MDC_IDC_SESS_DTM: NORMAL
MDC_IDC_SESS_TYPE: NORMAL
MDC_IDC_SESS_TYPE: NORMAL
MDC_IDC_SET_BRADY_HYSTRATE: NORMAL
MDC_IDC_SET_BRADY_LOWRATE: 40 {BEATS}/MIN
MDC_IDC_SET_BRADY_LOWRATE: 40 {BEATS}/MIN
MDC_IDC_SET_BRADY_MODE: NORMAL
MDC_IDC_SET_BRADY_MODE: NORMAL
MDC_IDC_SET_LEADCHNL_RV_PACING_AMPLITUDE: 3 V
MDC_IDC_SET_LEADCHNL_RV_PACING_AMPLITUDE: 3 V
MDC_IDC_SET_LEADCHNL_RV_PACING_CAPTURE_MODE: NORMAL
MDC_IDC_SET_LEADCHNL_RV_PACING_POLARITY: NORMAL
MDC_IDC_SET_LEADCHNL_RV_PACING_POLARITY: NORMAL
MDC_IDC_SET_LEADCHNL_RV_PACING_PULSEWIDTH: 0.5 MS
MDC_IDC_SET_LEADCHNL_RV_PACING_PULSEWIDTH: 0.5 MS
MDC_IDC_SET_LEADCHNL_RV_SENSING_ADAPTATION_MODE: NORMAL
MDC_IDC_SET_LEADCHNL_RV_SENSING_ADAPTATION_MODE: NORMAL
MDC_IDC_SET_LEADCHNL_RV_SENSING_POLARITY: NORMAL
MDC_IDC_SET_LEADCHNL_RV_SENSING_POLARITY: NORMAL
MDC_IDC_SET_LEADCHNL_RV_SENSING_SENSITIVITY: 0.6 MV
MDC_IDC_SET_LEADCHNL_RV_SENSING_SENSITIVITY: 0.6 MV
MDC_IDC_SET_ZONE_DETECTION_INTERVAL: 250 MS
MDC_IDC_SET_ZONE_DETECTION_INTERVAL: 250 MS
MDC_IDC_SET_ZONE_DETECTION_INTERVAL: 300 MS
MDC_IDC_SET_ZONE_DETECTION_INTERVAL: 300 MS
MDC_IDC_SET_ZONE_DETECTION_INTERVAL: 375 MS
MDC_IDC_SET_ZONE_DETECTION_INTERVAL: 375 MS
MDC_IDC_SET_ZONE_TYPE: NORMAL
MDC_IDC_SET_ZONE_VENDOR_TYPE: NORMAL
MDC_IDC_STAT_BRADY_DTM_END: NORMAL
MDC_IDC_STAT_BRADY_DTM_END: NORMAL
MDC_IDC_STAT_BRADY_DTM_START: NORMAL
MDC_IDC_STAT_BRADY_DTM_START: NORMAL
MDC_IDC_STAT_BRADY_RV_PERCENT_PACED: 0 %
MDC_IDC_STAT_BRADY_RV_PERCENT_PACED: 1 %
MDC_IDC_STAT_EPISODE_RECENT_COUNT: 0
MDC_IDC_STAT_EPISODE_RECENT_COUNT: 13
MDC_IDC_STAT_EPISODE_RECENT_COUNT: 13
MDC_IDC_STAT_EPISODE_RECENT_COUNT: 14
MDC_IDC_STAT_EPISODE_RECENT_COUNT: 4
MDC_IDC_STAT_EPISODE_RECENT_COUNT: 61
MDC_IDC_STAT_EPISODE_RECENT_COUNT_DTM_END: NORMAL
MDC_IDC_STAT_EPISODE_RECENT_COUNT_DTM_START: NORMAL
MDC_IDC_STAT_EPISODE_TOTAL_COUNT: 399
MDC_IDC_STAT_EPISODE_TOTAL_COUNT: 399
MDC_IDC_STAT_EPISODE_TOTAL_COUNT: 424
MDC_IDC_STAT_EPISODE_TOTAL_COUNT_DTM_END: NORMAL
MDC_IDC_STAT_EPISODE_TYPE: NORMAL
MDC_IDC_STAT_EPISODE_VENDOR_TYPE: NORMAL
MDC_IDC_STAT_TACHYTHERAPY_ATP_DELIVERED_RECENT: 0
MDC_IDC_STAT_TACHYTHERAPY_ATP_DELIVERED_RECENT: 0
MDC_IDC_STAT_TACHYTHERAPY_ATP_DELIVERED_TOTAL: 0
MDC_IDC_STAT_TACHYTHERAPY_ATP_DELIVERED_TOTAL: 0
MDC_IDC_STAT_TACHYTHERAPY_RECENT_DTM_END: NORMAL
MDC_IDC_STAT_TACHYTHERAPY_RECENT_DTM_END: NORMAL
MDC_IDC_STAT_TACHYTHERAPY_RECENT_DTM_START: NORMAL
MDC_IDC_STAT_TACHYTHERAPY_RECENT_DTM_START: NORMAL
MDC_IDC_STAT_TACHYTHERAPY_SHOCKS_ABORTED_RECENT: 0
MDC_IDC_STAT_TACHYTHERAPY_SHOCKS_ABORTED_RECENT: 0
MDC_IDC_STAT_TACHYTHERAPY_SHOCKS_ABORTED_TOTAL: 0
MDC_IDC_STAT_TACHYTHERAPY_SHOCKS_ABORTED_TOTAL: 0
MDC_IDC_STAT_TACHYTHERAPY_SHOCKS_DELIVERED_RECENT: 0
MDC_IDC_STAT_TACHYTHERAPY_SHOCKS_DELIVERED_RECENT: 0
MDC_IDC_STAT_TACHYTHERAPY_SHOCKS_DELIVERED_TOTAL: 0
MDC_IDC_STAT_TACHYTHERAPY_SHOCKS_DELIVERED_TOTAL: 0
MDC_IDC_STAT_TACHYTHERAPY_TOTAL_DTM_END: NORMAL
MDC_IDC_STAT_TACHYTHERAPY_TOTAL_DTM_END: NORMAL
MDC_IDC_STAT_TACHYTHERAPY_TOTAL_DTM_START: NORMAL
MDMA QUAL: NEGATIVE
MEPERIDINE QUAL: NEGATIVE
METHAMPHETAMINE: NEGATIVE
METHODONE QUAL: NEGATIVE
MIRTAZAPINE QUAL: NEGATIVE
MONOCYTES # BLD AUTO: 0.8 10E9/L (ref 0–1.3)
MONOCYTES # BLD AUTO: 0.8 10E9/L (ref 0–1.3)
MONOCYTES # BLD AUTO: 1.2 10E9/L (ref 0–1.3)
MONOCYTES # BLD AUTO: 1.2 10E9/L (ref 0–1.3)
MONOCYTES # BLD AUTO: 1.4 10E9/L (ref 0–1.3)
MONOCYTES NFR BLD AUTO: 12.3 %
MONOCYTES NFR BLD AUTO: 6.4 %
MONOCYTES NFR BLD AUTO: 6.7 %
MONOCYTES NFR BLD AUTO: 7.4 %
MONOCYTES NFR BLD AUTO: 8 %
MONOCYTES NFR BLD AUTO: NORMAL %
MORPHINE QUAL: NEGATIVE
MRSA DNA SPEC QL NAA+PROBE: NEGATIVE
MUCOUS THREADS #/AREA URNS LPF: PRESENT /LPF
NEUTROPHILS # BLD AUTO: 10 10E9/L (ref 1.6–8.3)
NEUTROPHILS # BLD AUTO: 15.4 10E9/L (ref 1.6–8.3)
NEUTROPHILS # BLD AUTO: 16.1 10E9/L (ref 1.6–8.3)
NEUTROPHILS # BLD AUTO: 7.2 10E9/L (ref 1.6–8.3)
NEUTROPHILS # BLD AUTO: 8.8 10E9/L (ref 1.6–8.3)
NEUTROPHILS NFR BLD AUTO: 74.5 %
NEUTROPHILS NFR BLD AUTO: 80.7 %
NEUTROPHILS NFR BLD AUTO: 82.9 %
NEUTROPHILS NFR BLD AUTO: 82.9 %
NEUTROPHILS NFR BLD AUTO: 87.2 %
NEUTROPHILS NFR BLD AUTO: NORMAL %
NICOTINE SERPL-MCNC: <1 NG/ML
NICOTINE: NEGATIVE
NITRATE UR QL: NEGATIVE
NORTRIPTYLINE QUAL: NEGATIVE
NRBC # BLD AUTO: 0 10*3/UL
NRBC # BLD AUTO: 0.3 10*3/UL
NRBC BLD AUTO-RTO: 0 /100
NRBC BLD AUTO-RTO: 2 /100
NUM BPU REQUESTED: 0
NUM BPU REQUESTED: 2
O2/TOTAL GAS SETTING VFR VENT: 100 %
O2/TOTAL GAS SETTING VFR VENT: 15 %
O2/TOTAL GAS SETTING VFR VENT: 40 %
O2/TOTAL GAS SETTING VFR VENT: 50 %
O2/TOTAL GAS SETTING VFR VENT: 52 %
O2/TOTAL GAS SETTING VFR VENT: 60 %
O2/TOTAL GAS SETTING VFR VENT: 70 %
O2/TOTAL GAS SETTING VFR VENT: 80 %
O2/TOTAL GAS SETTING VFR VENT: NORMAL %
OLANZAPINE QUAL: NEGATIVE
OPIATES UR QL SCN: NEGATIVE
OPIATES UR QL SCN: NEGATIVE
OXYCODONE QUAL: NEGATIVE
OXYHGB MFR BLD: 92 % (ref 92–100)
OXYHGB MFR BLD: 94 % (ref 92–100)
OXYHGB MFR BLD: 95 % (ref 92–100)
OXYHGB MFR BLD: 95 % (ref 92–100)
OXYHGB MFR BLD: 96 % (ref 92–100)
OXYHGB MFR BLD: 97 % (ref 92–100)
OXYHGB MFR BLDV: 20 %
OXYHGB MFR BLDV: 23 %
OXYHGB MFR BLDV: 25 %
OXYHGB MFR BLDV: 28 %
OXYHGB MFR BLDV: 39 %
OXYHGB MFR BLDV: 41 %
OXYHGB MFR BLDV: 42 %
OXYHGB MFR BLDV: 42 %
OXYHGB MFR BLDV: 44 %
OXYHGB MFR BLDV: 45 %
OXYHGB MFR BLDV: 46 %
OXYHGB MFR BLDV: 46 %
OXYHGB MFR BLDV: 47 %
OXYHGB MFR BLDV: 48 %
OXYHGB MFR BLDV: 48 %
OXYHGB MFR BLDV: 49 %
OXYHGB MFR BLDV: 51 %
OXYHGB MFR BLDV: 51 %
OXYHGB MFR BLDV: 52 %
OXYHGB MFR BLDV: 54 %
OXYHGB MFR BLDV: 55 %
OXYHGB MFR BLDV: 56 %
OXYHGB MFR BLDV: 58 %
OXYHGB MFR BLDV: 59 %
OXYHGB MFR BLDV: 60 %
OXYHGB MFR BLDV: 63 %
OXYHGB MFR BLDV: 65 %
OXYHGB MFR BLDV: 66 %
OXYHGB MFR BLDV: 66 %
OXYHGB MFR BLDV: 69 %
OXYHGB MFR BLDV: 70 %
OXYHGB MFR BLDV: 70 %
OXYHGB MFR BLDV: 72 %
OXYHGB MFR BLDV: 73 %
OXYHGB MFR BLDV: 73 %
OXYHGB MFR BLDV: 74 %
OXYHGB MFR BLDV: 77 %
OXYHGB MFR BLDV: 78 %
OXYHGB MFR BLDV: 94 %
OXYHGB MFR BLDV: NORMAL %
PCO2 BLD: 26 MM HG (ref 35–45)
PCO2 BLD: 32 MM HG (ref 35–45)
PCO2 BLD: 33 MM HG (ref 35–45)
PCO2 BLD: 33 MM HG (ref 35–45)
PCO2 BLD: 34 MM HG (ref 35–45)
PCO2 BLD: 35 MM HG (ref 35–45)
PCO2 BLD: 36 MM HG (ref 35–45)
PCO2 BLD: 37 MM HG (ref 35–45)
PCO2 BLD: 37 MM HG (ref 35–45)
PCO2 BLD: 38 MM HG (ref 35–45)
PCO2 BLD: 39 MM HG (ref 35–45)
PCO2 BLD: 41 MM HG (ref 35–45)
PCO2 BLD: 42 MM HG (ref 35–45)
PCO2 BLD: 42 MM HG (ref 35–45)
PCO2 BLD: 43 MM HG (ref 35–45)
PCO2 BLD: 45 MM HG (ref 35–45)
PCO2 BLD: 46 MM HG (ref 35–45)
PCO2 BLD: 47 MM HG (ref 35–45)
PCO2 BLDV: 12 MM HG (ref 40–50)
PCO2 BLDV: 32 MM HG (ref 40–50)
PCO2 BLDV: 37 MM HG (ref 40–50)
PCO2 BLDV: 39 MM HG (ref 40–50)
PCO2 BLDV: 39 MM HG (ref 40–50)
PCO2 BLDV: 40 MM HG (ref 40–50)
PCO2 BLDV: 40 MM HG (ref 40–50)
PCO2 BLDV: 41 MM HG (ref 40–50)
PCO2 BLDV: 44 MM HG (ref 40–50)
PCO2 BLDV: 45 MM HG (ref 40–50)
PCO2 BLDV: 46 MM HG (ref 40–50)
PCO2 BLDV: 47 MM HG (ref 40–50)
PCO2 BLDV: 48 MM HG (ref 40–50)
PCO2 BLDV: 49 MM HG (ref 40–50)
PCO2 BLDV: 49 MM HG (ref 40–50)
PCO2 BLDV: 50 MM HG (ref 40–50)
PCO2 BLDV: 50 MM HG (ref 40–50)
PCO2 BLDV: 51 MM HG (ref 40–50)
PCO2 BLDV: 52 MM HG (ref 40–50)
PCO2 BLDV: 53 MM HG (ref 40–50)
PCO2 BLDV: 54 MM HG (ref 40–50)
PCO2 BLDV: 55 MM HG (ref 40–50)
PCO2 BLDV: NORMAL MM HG (ref 40–50)
PCP UR QL SCN: NEGATIVE
PENTAZOCINE: NEGATIVE
PH BLD: 7.15 PH (ref 7.35–7.45)
PH BLD: 7.27 PH (ref 7.35–7.45)
PH BLD: 7.35 PH (ref 7.35–7.45)
PH BLD: 7.38 PH (ref 7.35–7.45)
PH BLD: 7.41 PH (ref 7.35–7.45)
PH BLD: 7.42 PH (ref 7.35–7.45)
PH BLD: 7.42 PH (ref 7.35–7.45)
PH BLD: 7.43 PH (ref 7.35–7.45)
PH BLD: 7.44 PH (ref 7.35–7.45)
PH BLD: 7.45 PH (ref 7.35–7.45)
PH BLD: 7.46 PH (ref 7.35–7.45)
PH BLD: 7.47 PH (ref 7.35–7.45)
PH BLD: 7.49 PH (ref 7.35–7.45)
PH BLD: 7.5 PH (ref 7.35–7.45)
PH BLD: 7.51 PH (ref 7.35–7.45)
PH BLD: 7.51 PH (ref 7.35–7.45)
PH BLD: 7.52 PH (ref 7.35–7.45)
PH BLD: 7.54 PH (ref 7.35–7.45)
PH BLD: 7.55 PH (ref 7.35–7.45)
PH BLD: 7.56 PH (ref 7.35–7.45)
PH BLDV: 7.27 PH (ref 7.32–7.43)
PH BLDV: 7.31 PH (ref 7.32–7.43)
PH BLDV: 7.32 PH (ref 7.32–7.43)
PH BLDV: 7.33 PH (ref 7.32–7.43)
PH BLDV: 7.34 PH (ref 7.32–7.43)
PH BLDV: 7.35 PH (ref 7.32–7.43)
PH BLDV: 7.35 PH (ref 7.32–7.43)
PH BLDV: 7.36 PH (ref 7.32–7.43)
PH BLDV: 7.37 PH (ref 7.32–7.43)
PH BLDV: 7.38 PH (ref 7.32–7.43)
PH BLDV: 7.38 PH (ref 7.32–7.43)
PH BLDV: 7.39 PH (ref 7.32–7.43)
PH BLDV: 7.4 PH (ref 7.32–7.43)
PH BLDV: 7.42 PH (ref 7.32–7.43)
PH BLDV: 7.43 PH (ref 7.32–7.43)
PH BLDV: 7.44 PH (ref 7.32–7.43)
PH BLDV: 7.45 PH (ref 7.32–7.43)
PH BLDV: 7.46 PH (ref 7.32–7.43)
PH BLDV: 7.47 PH (ref 7.32–7.43)
PH BLDV: 7.49 PH (ref 7.32–7.43)
PH BLDV: 7.51 PH (ref 7.32–7.43)
PH BLDV: 7.52 PH (ref 7.32–7.43)
PH BLDV: NORMAL PH (ref 7.32–7.43)
PH UR STRIP: 5 PH (ref 5–7)
PH UR STRIP: 5 PH (ref 5–7)
PH UR STRIP: 5.5 PH (ref 5–7)
PH UR STRIP: 5.5 PH (ref 5–7)
PH UR STRIP: 6.5 PH (ref 5–7)
PHENCYCLIDINE QUAL: NEGATIVE
PHENTERMINE: NEGATIVE
PHOSPHATE SERPL-MCNC: 1.7 MG/DL (ref 2.5–4.5)
PHOSPHATE SERPL-MCNC: 1.8 MG/DL (ref 2.5–4.5)
PHOSPHATE SERPL-MCNC: 1.9 MG/DL (ref 2.5–4.5)
PHOSPHATE SERPL-MCNC: 2.2 MG/DL (ref 2.5–4.5)
PHOSPHATE SERPL-MCNC: 2.2 MG/DL (ref 2.5–4.5)
PHOSPHATE SERPL-MCNC: 2.3 MG/DL (ref 2.5–4.5)
PHOSPHATE SERPL-MCNC: 2.3 MG/DL (ref 2.5–4.5)
PHOSPHATE SERPL-MCNC: 2.6 MG/DL (ref 2.5–4.5)
PHOSPHATE SERPL-MCNC: 2.7 MG/DL (ref 2.5–4.5)
PHOSPHATE SERPL-MCNC: 2.7 MG/DL (ref 2.5–4.5)
PHOSPHATE SERPL-MCNC: 2.8 MG/DL (ref 2.5–4.5)
PHOSPHATE SERPL-MCNC: 2.8 MG/DL (ref 2.5–4.5)
PHOSPHATE SERPL-MCNC: 3 MG/DL (ref 2.5–4.5)
PHOSPHATE SERPL-MCNC: 3.1 MG/DL (ref 2.5–4.5)
PHOSPHATE SERPL-MCNC: 3.1 MG/DL (ref 2.5–4.5)
PHOSPHATE SERPL-MCNC: 3.2 MG/DL (ref 2.5–4.5)
PHOSPHATE SERPL-MCNC: NORMAL MG/DL (ref 2.5–4.5)
PLATELET # BLD AUTO: 160 10E9/L (ref 150–450)
PLATELET # BLD AUTO: 165 10E9/L (ref 150–450)
PLATELET # BLD AUTO: 172 10E9/L (ref 150–450)
PLATELET # BLD AUTO: 177 10E9/L (ref 150–450)
PLATELET # BLD AUTO: 177 10E9/L (ref 150–450)
PLATELET # BLD AUTO: 215 10E9/L (ref 150–450)
PLATELET # BLD AUTO: 251 10E9/L (ref 150–450)
PLATELET # BLD AUTO: 262 10E9/L (ref 150–450)
PLATELET # BLD AUTO: 266 10E9/L (ref 150–450)
PLATELET # BLD AUTO: 291 10E9/L (ref 150–450)
PLATELET # BLD AUTO: 306 10E9/L (ref 150–450)
PLATELET # BLD AUTO: 318 10E9/L (ref 150–450)
PLATELET # BLD AUTO: 320 10E9/L (ref 150–450)
PLATELET # BLD AUTO: 320 10E9/L (ref 150–450)
PLATELET # BLD AUTO: 323 10E9/L (ref 150–450)
PLATELET # BLD AUTO: 340 10E9/L (ref 150–450)
PLATELET # BLD AUTO: 341 10E9/L (ref 150–450)
PLATELET # BLD AUTO: 375 10E9/L (ref 150–450)
PLATELET # BLD AUTO: 396 10E9/L (ref 150–450)
PLATELET # BLD AUTO: 422 10E9/L (ref 150–450)
PLATELET # BLD AUTO: 457 10E9/L (ref 150–450)
PLATELET # BLD AUTO: 461 10E9/L (ref 150–450)
PLATELET # BLD AUTO: 470 10E9/L (ref 150–450)
PLATELET # BLD AUTO: 474 10E9/L (ref 150–450)
PLATELET # BLD AUTO: 503 10E9/L (ref 150–450)
PLATELET # BLD AUTO: 507 10E9/L (ref 150–450)
PLATELET COUNT - QUEST: 285 10^9/L (ref 150–450)
PLATELET FUNCTION ASA: 395 ARU
PLATELET FUNCTION ASA: NORMAL ARU
PLATELET INHIBITION WITH AA: 100 %
PLATELET INHIBITION WITH AA: 100 %
PLATELET INHIBITION WITH AA: 85 %
PLATELET INHIBITION WITH ADP: 78 %
PLATELET INHIBITION WITH ADP: 88 %
PLATELET INHIBITION WITH ADP: 89 %
PO2 BLD: 100 MM HG (ref 80–105)
PO2 BLD: 101 MM HG (ref 80–105)
PO2 BLD: 106 MM HG (ref 80–105)
PO2 BLD: 109 MM HG (ref 80–105)
PO2 BLD: 111 MM HG (ref 80–105)
PO2 BLD: 133 MM HG (ref 80–105)
PO2 BLD: 156 MM HG (ref 80–105)
PO2 BLD: 55 MM HG (ref 80–105)
PO2 BLD: 57 MM HG (ref 80–105)
PO2 BLD: 62 MM HG (ref 80–105)
PO2 BLD: 63 MM HG (ref 80–105)
PO2 BLD: 63 MM HG (ref 80–105)
PO2 BLD: 66 MM HG (ref 80–105)
PO2 BLD: 67 MM HG (ref 80–105)
PO2 BLD: 72 MM HG (ref 80–105)
PO2 BLD: 73 MM HG (ref 80–105)
PO2 BLD: 74 MM HG (ref 80–105)
PO2 BLD: 75 MM HG (ref 80–105)
PO2 BLD: 78 MM HG (ref 80–105)
PO2 BLD: 79 MM HG (ref 80–105)
PO2 BLD: 79 MM HG (ref 80–105)
PO2 BLD: 80 MM HG (ref 80–105)
PO2 BLD: 82 MM HG (ref 80–105)
PO2 BLD: 84 MM HG (ref 80–105)
PO2 BLD: 85 MM HG (ref 80–105)
PO2 BLD: 86 MM HG (ref 80–105)
PO2 BLD: 87 MM HG (ref 80–105)
PO2 BLD: 87 MM HG (ref 80–105)
PO2 BLD: 88 MM HG (ref 80–105)
PO2 BLD: 89 MM HG (ref 80–105)
PO2 BLD: 95 MM HG (ref 80–105)
PO2 BLD: 97 MM HG (ref 80–105)
PO2 BLDV: 18 MM HG (ref 25–47)
PO2 BLDV: 20 MM HG (ref 25–47)
PO2 BLDV: 20 MM HG (ref 25–47)
PO2 BLDV: 21 MM HG (ref 25–47)
PO2 BLDV: 25 MM HG (ref 25–47)
PO2 BLDV: 26 MM HG (ref 25–47)
PO2 BLDV: 27 MM HG (ref 25–47)
PO2 BLDV: 28 MM HG (ref 25–47)
PO2 BLDV: 29 MM HG (ref 25–47)
PO2 BLDV: 29 MM HG (ref 25–47)
PO2 BLDV: 30 MM HG (ref 25–47)
PO2 BLDV: 31 MM HG (ref 25–47)
PO2 BLDV: 31 MM HG (ref 25–47)
PO2 BLDV: 32 MM HG (ref 25–47)
PO2 BLDV: 32 MM HG (ref 25–47)
PO2 BLDV: 33 MM HG (ref 25–47)
PO2 BLDV: 34 MM HG (ref 25–47)
PO2 BLDV: 34 MM HG (ref 25–47)
PO2 BLDV: 36 MM HG (ref 25–47)
PO2 BLDV: 37 MM HG (ref 25–47)
PO2 BLDV: 39 MM HG (ref 25–47)
PO2 BLDV: 39 MM HG (ref 25–47)
PO2 BLDV: 41 MM HG (ref 25–47)
PO2 BLDV: 41 MM HG (ref 25–47)
PO2 BLDV: 42 MM HG (ref 25–47)
PO2 BLDV: 42 MM HG (ref 25–47)
PO2 BLDV: 43 MM HG (ref 25–47)
PO2 BLDV: 46 MM HG (ref 25–47)
PO2 BLDV: 46 MM HG (ref 25–47)
PO2 BLDV: 74 MM HG (ref 25–47)
PO2 BLDV: NORMAL MM HG (ref 25–47)
POTASSIUM SERPL-SCNC: 3.6 MMOL/L
POTASSIUM SERPL-SCNC: 3.6 MMOL/L (ref 3.4–5.3)
POTASSIUM SERPL-SCNC: 3.7 MMOL/L (ref 3.4–5.3)
POTASSIUM SERPL-SCNC: 3.8 MMOL/L (ref 3.4–5.3)
POTASSIUM SERPL-SCNC: 3.9 MMOL/L (ref 3.4–5.3)
POTASSIUM SERPL-SCNC: 4 MMOL/L
POTASSIUM SERPL-SCNC: 4 MMOL/L (ref 3.4–5.3)
POTASSIUM SERPL-SCNC: 4.1 MMOL/L (ref 3.4–5.3)
POTASSIUM SERPL-SCNC: 4.2 MMOL/L (ref 3.4–5.3)
POTASSIUM SERPL-SCNC: 4.3 MMOL/L (ref 3.4–5.3)
POTASSIUM SERPL-SCNC: 4.4 MMOL/L (ref 3.4–5.3)
POTASSIUM SERPL-SCNC: 4.5 MMOL/L (ref 3.4–5.3)
PROCALCITONIN SERPL-MCNC: 0.19 NG/ML
PROCALCITONIN SERPL-MCNC: 0.46 NG/ML
PROPOFOL QUAL: NEGATIVE
PROPOXPHENE QUAL: NEGATIVE
PROPRANOLOL QUAL: NEGATIVE
PROT SERPL-MCNC: 5 G/DL (ref 6.8–8.8)
PROT SERPL-MCNC: 5.2 G/DL (ref 6.8–8.8)
PROT SERPL-MCNC: 5.3 G/DL (ref 6.8–8.8)
PROT SERPL-MCNC: 5.5 G/DL (ref 6.8–8.8)
PROT SERPL-MCNC: 5.6 G/DL (ref 6.8–8.8)
PROT SERPL-MCNC: 5.7 G/DL (ref 6.8–8.8)
PROT SERPL-MCNC: 5.8 G/DL (ref 6.8–8.8)
PROT SERPL-MCNC: 5.8 G/DL (ref 6.8–8.8)
PROT SERPL-MCNC: 6 G/DL (ref 6.8–8.8)
PROT SERPL-MCNC: 6.2 G/DL (ref 6.8–8.8)
PROT SERPL-MCNC: 6.2 G/DL (ref 6.8–8.8)
PROT SERPL-MCNC: 6.5 G/DL (ref 6.8–8.8)
PROT SERPL-MCNC: 6.6 G/DL (ref 6.8–8.8)
PROT SERPL-MCNC: 6.8 G/DL (ref 6.8–8.8)
PROT SERPL-MCNC: 7.2 G/DL
PROT SERPL-MCNC: 8.2 G/DL (ref 6.8–8.8)
PROT SERPL-MCNC: 8.5 G/DL (ref 6.8–8.8)
PROT SERPL-MCNC: 8.7 G/DL (ref 6.8–8.8)
PYRILAMINE: NEGATIVE
QUETIAPINE METAB QUAL: NEGATIVE
R TIME UNTIL CLOT FORMS: 10.3 MINUTE (ref 5–10)
R TIME UNTIL CLOT FORMS: 10.7 MINUTE (ref 5–10)
R TIME UNTIL CLOT FORMS: 11.8 MINUTE (ref 5–10)
R TIME UNTIL CLOT FORMS: 8.2 MINUTE (ref 5–10)
RADIOLOGIST FLAGS: ABNORMAL
RBC # BLD AUTO: 1.75 10E12/L (ref 4.4–5.9)
RBC # BLD AUTO: 2.17 10E12/L (ref 4.4–5.9)
RBC # BLD AUTO: 2.36 10E12/L (ref 4.4–5.9)
RBC # BLD AUTO: 2.42 10E12/L (ref 4.4–5.9)
RBC # BLD AUTO: 2.47 10E12/L (ref 4.4–5.9)
RBC # BLD AUTO: 2.48 10E12/L (ref 4.4–5.9)
RBC # BLD AUTO: 2.51 10E12/L (ref 4.4–5.9)
RBC # BLD AUTO: 2.6 10E12/L (ref 4.4–5.9)
RBC # BLD AUTO: 2.8 10E12/L (ref 4.4–5.9)
RBC # BLD AUTO: 3.2 10E12/L (ref 4.4–5.9)
RBC # BLD AUTO: 3.54 10E12/L (ref 4.4–5.9)
RBC # BLD AUTO: 3.65 10E12/L (ref 4.4–5.9)
RBC # BLD AUTO: 3.7 10E12/L (ref 4.4–5.9)
RBC # BLD AUTO: 3.89 10E12/L (ref 4.4–5.9)
RBC # BLD AUTO: 3.93 10E12/L (ref 4.4–5.9)
RBC # BLD AUTO: 4.2 10E12/L (ref 4.4–5.9)
RBC # BLD AUTO: 4.3 10E12/L (ref 4.4–5.9)
RBC # BLD AUTO: 4.42 10E12/L (ref 4.4–5.9)
RBC # BLD AUTO: 4.42 10E12/L (ref 4.4–5.9)
RBC # BLD AUTO: 4.69 10E12/L (ref 4.4–5.9)
RBC # BLD AUTO: 4.72 10^12/L
RBC # BLD AUTO: 4.82 10E12/L (ref 4.4–5.9)
RBC # BLD AUTO: 4.84 10E12/L (ref 4.4–5.9)
RBC # BLD AUTO: 4.92 10E12/L (ref 4.4–5.9)
RBC # BLD AUTO: 5.09 10E12/L (ref 4.4–5.9)
RBC # BLD AUTO: 5.09 10E12/L (ref 4.4–5.9)
RBC # BLD AUTO: 5.16 10E12/L (ref 4.4–5.9)
RBC #/AREA URNS AUTO: 103 /HPF (ref 0–2)
RBC #/AREA URNS AUTO: 2 /HPF (ref 0–2)
RBC #/AREA URNS AUTO: 3 /HPF (ref 0–2)
RBC #/AREA URNS AUTO: 73 /HPF (ref 0–2)
RBC #/AREA URNS AUTO: 9 /HPF (ref 0–2)
RETICS # AUTO: 84.8 10E9/L (ref 25–95)
RETICS/RBC NFR AUTO: 1.8 % (ref 0.5–2)
S PNEUM AG SPEC QL: NORMAL
SA1 CELL: NORMAL
SA1 COMMENTS: NORMAL
SA1 HI RISK ABY: NORMAL
SA1 MOD RISK ABY: NORMAL
SA1 TEST METHOD: NORMAL
SA2 CELL: NORMAL
SA2 COMMENTS: NORMAL
SA2 HI RISK ABY UA: NORMAL
SA2 MOD RISK ABY: NORMAL
SA2 TEST METHOD: NORMAL
SALICYLATE QUAL: NEGATIVE
SARS-COV-2 RNA SPEC QL NAA+PROBE: NORMAL
SARS-COV-2 RNA SPEC QL NAA+PROBE: POSITIVE
SERTRALINE QUAL: NEGATIVE
SODIUM SERPL-SCNC: 131 MMOL/L (ref 133–144)
SODIUM SERPL-SCNC: 133 MMOL/L (ref 133–144)
SODIUM SERPL-SCNC: 133 MMOL/L (ref 133–144)
SODIUM SERPL-SCNC: 134 MMOL/L (ref 133–144)
SODIUM SERPL-SCNC: 135 MMOL/L (ref 133–144)
SODIUM SERPL-SCNC: 136 MMOL/L
SODIUM SERPL-SCNC: 136 MMOL/L
SODIUM SERPL-SCNC: 136 MMOL/L (ref 133–144)
SODIUM SERPL-SCNC: 137 MMOL/L (ref 133–144)
SODIUM SERPL-SCNC: 137 MMOL/L (ref 133–144)
SODIUM SERPL-SCNC: 138 MMOL/L (ref 133–144)
SODIUM SERPL-SCNC: 139 MMOL/L (ref 133–144)
SODIUM SERPL-SCNC: 141 MMOL/L (ref 133–144)
SODIUM SERPL-SCNC: 142 MMOL/L (ref 133–144)
SODIUM SERPL-SCNC: 143 MMOL/L (ref 133–144)
SODIUM SERPL-SCNC: 144 MMOL/L (ref 133–144)
SODIUM SERPL-SCNC: 145 MMOL/L (ref 133–144)
SODIUM SERPL-SCNC: 145 MMOL/L (ref 133–144)
SODIUM SERPL-SCNC: 146 MMOL/L (ref 133–144)
SODIUM SERPL-SCNC: 147 MMOL/L (ref 133–144)
SODIUM SERPL-SCNC: 148 MMOL/L (ref 133–144)
SODIUM UR-SCNC: 23 MMOL/L
SOURCE: ABNORMAL
SP GR UR STRIP: 1.01 (ref 1–1.03)
SP GR UR STRIP: 1.02 (ref 1–1.03)
SP GR UR STRIP: 1.02 (ref 1–1.03)
SPECIMEN EXP DATE BLD: NORMAL
SPECIMEN EXP DATE BLD: NORMAL
SPECIMEN SOURCE: ABNORMAL
SPECIMEN SOURCE: NORMAL
SQUAMOUS #/AREA URNS AUTO: 2 /HPF (ref 0–1)
SQUAMOUS #/AREA URNS AUTO: 6 /HPF (ref 0–1)
SQUAMOUS #/AREA URNS AUTO: <1 /HPF (ref 0–1)
T PALLIDUM AB SER QL: NONREACTIVE
T3 SERPL-MCNC: 43 NG/DL (ref 60–181)
T3 SERPL-MCNC: 67 NG/DL (ref 60–181)
T3FREE SERPL-MCNC: 0.8 PG/ML (ref 2.3–4.2)
T3FREE SERPL-MCNC: 1.1 PG/ML (ref 2.3–4.2)
T4 FREE SERPL-MCNC: 0.93 NG/DL (ref 0.76–1.46)
T4 SERPL-MCNC: 10 UG/DL (ref 4.5–13.9)
T4 SERPL-MCNC: 8.3 UG/DL (ref 4.5–13.9)
THEOBROMINE: POSITIVE
TOPIRAMATE QUAL: NEGATIVE
TRAMADOL QUAL: NEGATIVE
TRANS CELLS #/AREA URNS HPF: 1 /HPF (ref 0–1)
TRANS CELLS #/AREA URNS HPF: 1 /HPF (ref 0–1)
TRANS CELLS #/AREA URNS HPF: <1 /HPF (ref 0–1)
TRANSFUSION STATUS PATIENT QL: NORMAL
TRIMIPRAMINE QUAL: NEGATIVE
TROPONIN I SERPL-MCNC: 0.47 UG/L (ref 0–0.04)
TROPONIN I SERPL-MCNC: 0.47 UG/L (ref 0–0.04)
TROPONIN I SERPL-MCNC: 21.83 UG/L (ref 0–0.04)
TROPONIN I SERPL-MCNC: 22.61 UG/L (ref 0–0.04)
TROPONIN I SERPL-MCNC: 24.78 UG/L (ref 0–0.04)
TSH SERPL DL<=0.005 MIU/L-ACNC: 1.73 MU/L (ref 0.4–4)
TSH SERPL DL<=0.005 MIU/L-ACNC: 4.14 MU/L (ref 0.4–4)
UFH PPP CHRO-ACNC: 0.31 IU/ML
UFH PPP CHRO-ACNC: 0.76 IU/ML
UFH PPP CHRO-ACNC: 0.82 IU/ML
UFH PPP CHRO-ACNC: 0.95 IU/ML
UNACCEPTABLE ANTIGEN: NORMAL
UNOS CPRA: 0
UROBILINOGEN UR STRIP-MCNC: 0 MG/DL (ref 0–2)
UROBILINOGEN UR STRIP-MCNC: 2 MG/DL (ref 0–2)
UROBILINOGEN UR STRIP-MCNC: NORMAL MG/DL (ref 0–2)
VANCOMYCIN SERPL-MCNC: 16.9 MG/L
VENLAFAXINE QUAL: NEGATIVE
VON WILLEBRAND INTERPRETATION: NORMAL
VWF CBA/VWF AG PPP IA-RTO: 512 % (ref 50–200)
VWF MULTIMERS PPP QL: NORMAL
VWF MULTIMERS PPP QL: NORMAL
VWF:AC ACT/NOR PPP IA: >390 % (ref 50–180)
VWF:RCO ACT/NOR PPP PL AGG: 252 % (ref 51–215)
VWF:RCO ACT/NOR PPP PL AGG: NORMAL %
WBC # BLD AUTO: 10.3 10E9/L (ref 4–11)
WBC # BLD AUTO: 10.5 10E9/L (ref 4–11)
WBC # BLD AUTO: 10.6 10E9/L (ref 4–11)
WBC # BLD AUTO: 11 10E9/L (ref 4–11)
WBC # BLD AUTO: 11.9 10E9/L (ref 4–11)
WBC # BLD AUTO: 12.1 10E9/L (ref 4–11)
WBC # BLD AUTO: 13 10E9/L (ref 4–11)
WBC # BLD AUTO: 13.2 10E9/L (ref 4–11)
WBC # BLD AUTO: 13.3 10E9/L (ref 4–11)
WBC # BLD AUTO: 13.5 10E9/L (ref 4–11)
WBC # BLD AUTO: 13.7 10E9/L (ref 4–11)
WBC # BLD AUTO: 14.8 10E9/L (ref 4–11)
WBC # BLD AUTO: 14.8 10E9/L (ref 4–11)
WBC # BLD AUTO: 15.2 10E9/L (ref 4–11)
WBC # BLD AUTO: 15.3 10E9/L (ref 4–11)
WBC # BLD AUTO: 18.3 10E9/L (ref 4–11)
WBC # BLD AUTO: 18.4 10E9/L (ref 4–11)
WBC # BLD AUTO: 18.4 10E9/L (ref 4–11)
WBC # BLD AUTO: 18.5 10E9/L (ref 4–11)
WBC # BLD AUTO: 18.8 10E9/L (ref 4–11)
WBC # BLD AUTO: 19.1 10E9/L (ref 4–11)
WBC # BLD AUTO: 20.9 10E9/L (ref 4–11)
WBC # BLD AUTO: 26 10E9/L (ref 4–11)
WBC # BLD AUTO: 26.6 10E9/L (ref 4–11)
WBC # BLD AUTO: 30.8 10E9/L (ref 4–11)
WBC # BLD AUTO: 7.7 10^9/L
WBC # BLD AUTO: 9.7 10E9/L (ref 4–11)
WBC #/AREA URNS AUTO: 0 /HPF (ref 0–5)
WBC #/AREA URNS AUTO: 1 /HPF (ref 0–5)
WBC #/AREA URNS AUTO: 2 /HPF (ref 0–5)
WBC #/AREA URNS AUTO: 9 /HPF (ref 0–5)
WBC #/AREA URNS AUTO: <1 /HPF (ref 0–5)

## 2020-01-01 PROCEDURE — 250N000011 HC RX IP 250 OP 636: Performed by: STUDENT IN AN ORGANIZED HEALTH CARE EDUCATION/TRAINING PROGRAM

## 2020-01-01 PROCEDURE — 71045 X-RAY EXAM CHEST 1 VIEW: CPT

## 2020-01-01 PROCEDURE — 87070 CULTURE OTHR SPECIMN AEROBIC: CPT | Performed by: INTERNAL MEDICINE

## 2020-01-01 PROCEDURE — 250N000013 HC RX MED GY IP 250 OP 250 PS 637: Performed by: STUDENT IN AN ORGANIZED HEALTH CARE EDUCATION/TRAINING PROGRAM

## 2020-01-01 PROCEDURE — 250N000011 HC RX IP 250 OP 636

## 2020-01-01 PROCEDURE — 80053 COMPREHEN METABOLIC PANEL: CPT | Performed by: STUDENT IN AN ORGANIZED HEALTH CARE EDUCATION/TRAINING PROGRAM

## 2020-01-01 PROCEDURE — 250N000013 HC RX MED GY IP 250 OP 250 PS 637: Performed by: INTERNAL MEDICINE

## 2020-01-01 PROCEDURE — 94003 VENT MGMT INPAT SUBQ DAY: CPT

## 2020-01-01 PROCEDURE — 999N001017 HC STATISTIC GLUCOSE BY METER IP

## 2020-01-01 PROCEDURE — 250N000009 HC RX 250: Performed by: STUDENT IN AN ORGANIZED HEALTH CARE EDUCATION/TRAINING PROGRAM

## 2020-01-01 PROCEDURE — 272N000458 ZZ HC KIT, 5 FR DL BIOFLO OPEN ENDED PICC

## 2020-01-01 PROCEDURE — 84100 ASSAY OF PHOSPHORUS: CPT | Performed by: STUDENT IN AN ORGANIZED HEALTH CARE EDUCATION/TRAINING PROGRAM

## 2020-01-01 PROCEDURE — 36415 COLL VENOUS BLD VENIPUNCTURE: CPT | Performed by: PHYSICIAN ASSISTANT

## 2020-01-01 PROCEDURE — 999N000155 HC STATISTIC RAPCV CVP MONITORING

## 2020-01-01 PROCEDURE — 82803 BLOOD GASES ANY COMBINATION: CPT | Performed by: INTERNAL MEDICINE

## 2020-01-01 PROCEDURE — 999N000045 HC STATISTIC DAILY SWAN MONITORING

## 2020-01-01 PROCEDURE — 999N000065 XR CHEST PORT 1 VW

## 2020-01-01 PROCEDURE — 81001 URINALYSIS AUTO W/SCOPE: CPT | Performed by: STUDENT IN AN ORGANIZED HEALTH CARE EDUCATION/TRAINING PROGRAM

## 2020-01-01 PROCEDURE — 272N000078 HC NUTRITION PRODUCT INTERMEDIATE LITER

## 2020-01-01 PROCEDURE — 250N000012 HC RX MED GY IP 250 OP 636 PS 637: Performed by: NURSE PRACTITIONER

## 2020-01-01 PROCEDURE — 82805 BLOOD GASES W/O2 SATURATION: CPT | Performed by: INTERNAL MEDICINE

## 2020-01-01 PROCEDURE — 84132 ASSAY OF SERUM POTASSIUM: CPT | Performed by: INTERNAL MEDICINE

## 2020-01-01 PROCEDURE — 83615 LACTATE (LD) (LDH) ENZYME: CPT | Performed by: STUDENT IN AN ORGANIZED HEALTH CARE EDUCATION/TRAINING PROGRAM

## 2020-01-01 PROCEDURE — 99214 OFFICE O/P EST MOD 30 MIN: CPT | Mod: 25 | Performed by: INTERNAL MEDICINE

## 2020-01-01 PROCEDURE — 250N000011 HC RX IP 250 OP 636: Performed by: NURSE PRACTITIONER

## 2020-01-01 PROCEDURE — 258N000003 HC RX IP 258 OP 636: Performed by: STUDENT IN AN ORGANIZED HEALTH CARE EDUCATION/TRAINING PROGRAM

## 2020-01-01 PROCEDURE — 85027 COMPLETE CBC AUTOMATED: CPT | Performed by: PHYSICIAN ASSISTANT

## 2020-01-01 PROCEDURE — 85027 COMPLETE CBC AUTOMATED: CPT

## 2020-01-01 PROCEDURE — 258N000003 HC RX IP 258 OP 636: Performed by: INTERNAL MEDICINE

## 2020-01-01 PROCEDURE — 272N000054 HC CANNULA HIGH FLOW, ADULT

## 2020-01-01 PROCEDURE — 82803 BLOOD GASES ANY COMBINATION: CPT | Performed by: STUDENT IN AN ORGANIZED HEALTH CARE EDUCATION/TRAINING PROGRAM

## 2020-01-01 PROCEDURE — 250N000012 HC RX MED GY IP 250 OP 636 PS 637: Performed by: INTERNAL MEDICINE

## 2020-01-01 PROCEDURE — 71045 X-RAY EXAM CHEST 1 VIEW: CPT | Mod: 26 | Performed by: RADIOLOGY

## 2020-01-01 PROCEDURE — 76705 ECHO EXAM OF ABDOMEN: CPT | Mod: 26 | Performed by: RADIOLOGY

## 2020-01-01 PROCEDURE — 5A1955Z RESPIRATORY VENTILATION, GREATER THAN 96 CONSECUTIVE HOURS: ICD-10-PCS | Performed by: INTERNAL MEDICINE

## 2020-01-01 PROCEDURE — 99215 OFFICE O/P EST HI 40 MIN: CPT | Mod: 25 | Performed by: PHYSICIAN ASSISTANT

## 2020-01-01 PROCEDURE — 82805 BLOOD GASES W/O2 SATURATION: CPT

## 2020-01-01 PROCEDURE — 250N000009 HC RX 250: Performed by: INTERNAL MEDICINE

## 2020-01-01 PROCEDURE — 999N000015 HC STATISTIC ARTERIAL MONITORING DAILY

## 2020-01-01 PROCEDURE — 94645 CONT INHLJ TX EACH ADDL HOUR: CPT

## 2020-01-01 PROCEDURE — 36415 COLL VENOUS BLD VENIPUNCTURE: CPT | Performed by: STUDENT IN AN ORGANIZED HEALTH CARE EDUCATION/TRAINING PROGRAM

## 2020-01-01 PROCEDURE — 87075 CULTR BACTERIA EXCEPT BLOOD: CPT | Performed by: STUDENT IN AN ORGANIZED HEALTH CARE EDUCATION/TRAINING PROGRAM

## 2020-01-01 PROCEDURE — 85004 AUTOMATED DIFF WBC COUNT: CPT | Performed by: STUDENT IN AN ORGANIZED HEALTH CARE EDUCATION/TRAINING PROGRAM

## 2020-01-01 PROCEDURE — 85379 FIBRIN DEGRADATION QUANT: CPT | Performed by: PHYSICIAN ASSISTANT

## 2020-01-01 PROCEDURE — 85610 PROTHROMBIN TIME: CPT | Performed by: STUDENT IN AN ORGANIZED HEALTH CARE EDUCATION/TRAINING PROGRAM

## 2020-01-01 PROCEDURE — 250N000011 HC RX IP 250 OP 636: Performed by: INTERNAL MEDICINE

## 2020-01-01 PROCEDURE — 71045 X-RAY EXAM CHEST 1 VIEW: CPT | Mod: 26

## 2020-01-01 PROCEDURE — 74177 CT ABD & PELVIS W/CONTRAST: CPT | Mod: 26 | Performed by: RADIOLOGY

## 2020-01-01 PROCEDURE — 71260 CT THORAX DX C+: CPT

## 2020-01-01 PROCEDURE — 99233 SBSQ HOSP IP/OBS HIGH 50: CPT | Mod: GC | Performed by: INTERNAL MEDICINE

## 2020-01-01 PROCEDURE — 85730 THROMBOPLASTIN TIME PARTIAL: CPT | Performed by: STUDENT IN AN ORGANIZED HEALTH CARE EDUCATION/TRAINING PROGRAM

## 2020-01-01 PROCEDURE — 86901 BLOOD TYPING SEROLOGIC RH(D): CPT | Performed by: INTERNAL MEDICINE

## 2020-01-01 PROCEDURE — 84436 ASSAY OF TOTAL THYROXINE: CPT | Performed by: INTERNAL MEDICINE

## 2020-01-01 PROCEDURE — 83615 LACTATE (LD) (LDH) ENZYME: CPT | Performed by: PHYSICIAN ASSISTANT

## 2020-01-01 PROCEDURE — 83605 ASSAY OF LACTIC ACID: CPT | Performed by: STUDENT IN AN ORGANIZED HEALTH CARE EDUCATION/TRAINING PROGRAM

## 2020-01-01 PROCEDURE — 71275 CT ANGIOGRAPHY CHEST: CPT

## 2020-01-01 PROCEDURE — 85520 HEPARIN ASSAY: CPT | Performed by: INTERNAL MEDICINE

## 2020-01-01 PROCEDURE — 999N000208 ECHOCARDIOGRAM COMPLETE

## 2020-01-01 PROCEDURE — 93005 ELECTROCARDIOGRAM TRACING: CPT

## 2020-01-01 PROCEDURE — 36415 COLL VENOUS BLD VENIPUNCTURE: CPT | Performed by: INTERNAL MEDICINE

## 2020-01-01 PROCEDURE — 86140 C-REACTIVE PROTEIN: CPT | Performed by: PHYSICIAN ASSISTANT

## 2020-01-01 PROCEDURE — 86923 COMPATIBILITY TEST ELECTRIC: CPT | Performed by: INTERNAL MEDICINE

## 2020-01-01 PROCEDURE — 99291 CRITICAL CARE FIRST HOUR: CPT | Mod: 25 | Performed by: INTERNAL MEDICINE

## 2020-01-01 PROCEDURE — 250N000013 HC RX MED GY IP 250 OP 250 PS 637: Performed by: NURSE PRACTITIONER

## 2020-01-01 PROCEDURE — 93306 TTE W/DOPPLER COMPLETE: CPT

## 2020-01-01 PROCEDURE — 99291 CRITICAL CARE FIRST HOUR: CPT | Mod: GC | Performed by: INTERNAL MEDICINE

## 2020-01-01 PROCEDURE — 76770 US EXAM ABDO BACK WALL COMP: CPT

## 2020-01-01 PROCEDURE — 85027 COMPLETE CBC AUTOMATED: CPT | Performed by: INTERNAL MEDICINE

## 2020-01-01 PROCEDURE — 999N000157 HC STATISTIC RCP TIME EA 10 MIN

## 2020-01-01 PROCEDURE — 76705 ECHO EXAM OF ABDOMEN: CPT

## 2020-01-01 PROCEDURE — 250N000013 HC RX MED GY IP 250 OP 250 PS 637: Performed by: PHYSICIAN ASSISTANT

## 2020-01-01 PROCEDURE — 99291 CRITICAL CARE FIRST HOUR: CPT | Performed by: ANESTHESIOLOGY

## 2020-01-01 PROCEDURE — 87899 AGENT NOS ASSAY W/OPTIC: CPT | Performed by: STUDENT IN AN ORGANIZED HEALTH CARE EDUCATION/TRAINING PROGRAM

## 2020-01-01 PROCEDURE — 93010 ELECTROCARDIOGRAM REPORT: CPT | Performed by: INTERNAL MEDICINE

## 2020-01-01 PROCEDURE — 250N000012 HC RX MED GY IP 250 OP 636 PS 637: Performed by: STUDENT IN AN ORGANIZED HEALTH CARE EDUCATION/TRAINING PROGRAM

## 2020-01-01 PROCEDURE — 93503 INSERT/PLACE HEART CATHETER: CPT

## 2020-01-01 PROCEDURE — 85610 PROTHROMBIN TIME: CPT | Performed by: INTERNAL MEDICINE

## 2020-01-01 PROCEDURE — 36569 INSJ PICC 5 YR+ W/O IMAGING: CPT

## 2020-01-01 PROCEDURE — 82803 BLOOD GASES ANY COMBINATION: CPT | Performed by: PHYSICIAN ASSISTANT

## 2020-01-01 PROCEDURE — 87641 MR-STAPH DNA AMP PROBE: CPT | Performed by: INTERNAL MEDICINE

## 2020-01-01 PROCEDURE — 99222 1ST HOSP IP/OBS MODERATE 55: CPT | Performed by: THORACIC SURGERY (CARDIOTHORACIC VASCULAR SURGERY)

## 2020-01-01 PROCEDURE — 99232 SBSQ HOSP IP/OBS MODERATE 35: CPT | Mod: GC | Performed by: INTERNAL MEDICINE

## 2020-01-01 PROCEDURE — 250N000013 HC RX MED GY IP 250 OP 250 PS 637

## 2020-01-01 PROCEDURE — 3E043XZ INTRODUCTION OF VASOPRESSOR INTO CENTRAL VEIN, PERCUTANEOUS APPROACH: ICD-10-PCS | Performed by: INTERNAL MEDICINE

## 2020-01-01 PROCEDURE — 83735 ASSAY OF MAGNESIUM: CPT | Performed by: STUDENT IN AN ORGANIZED HEALTH CARE EDUCATION/TRAINING PROGRAM

## 2020-01-01 PROCEDURE — 87106 FUNGI IDENTIFICATION YEAST: CPT | Performed by: STUDENT IN AN ORGANIZED HEALTH CARE EDUCATION/TRAINING PROGRAM

## 2020-01-01 PROCEDURE — 83036 HEMOGLOBIN GLYCOSYLATED A1C: CPT | Performed by: INTERNAL MEDICINE

## 2020-01-01 PROCEDURE — 85246 CLOT FACTOR VIII VW ANTIGEN: CPT | Performed by: INTERNAL MEDICINE

## 2020-01-01 PROCEDURE — 84443 ASSAY THYROID STIM HORMONE: CPT | Performed by: INTERNAL MEDICINE

## 2020-01-01 PROCEDURE — 200N000002 HC R&B ICU UMMC

## 2020-01-01 PROCEDURE — 85025 COMPLETE CBC W/AUTO DIFF WBC: CPT | Performed by: PHYSICIAN ASSISTANT

## 2020-01-01 PROCEDURE — 85027 COMPLETE CBC AUTOMATED: CPT | Performed by: NURSE PRACTITIONER

## 2020-01-01 PROCEDURE — 93970 EXTREMITY STUDY: CPT

## 2020-01-01 PROCEDURE — 80053 COMPREHEN METABOLIC PANEL: CPT | Performed by: INTERNAL MEDICINE

## 2020-01-01 PROCEDURE — 85396 CLOTTING ASSAY WHOLE BLOOD: CPT

## 2020-01-01 PROCEDURE — 97110 THERAPEUTIC EXERCISES: CPT | Mod: GO | Performed by: OCCUPATIONAL THERAPIST

## 2020-01-01 PROCEDURE — 250N000011 HC RX IP 250 OP 636: Performed by: PHYSICIAN ASSISTANT

## 2020-01-01 PROCEDURE — 80048 BASIC METABOLIC PNL TOTAL CA: CPT | Performed by: STUDENT IN AN ORGANIZED HEALTH CARE EDUCATION/TRAINING PROGRAM

## 2020-01-01 PROCEDURE — 85610 PROTHROMBIN TIME: CPT | Performed by: PHYSICIAN ASSISTANT

## 2020-01-01 PROCEDURE — 99233 SBSQ HOSP IP/OBS HIGH 50: CPT | Performed by: PHYSICIAN ASSISTANT

## 2020-01-01 PROCEDURE — 99291 CRITICAL CARE FIRST HOUR: CPT | Performed by: INTERNAL MEDICINE

## 2020-01-01 PROCEDURE — 85730 THROMBOPLASTIN TIME PARTIAL: CPT | Performed by: NURSE PRACTITIONER

## 2020-01-01 PROCEDURE — 83735 ASSAY OF MAGNESIUM: CPT | Performed by: INTERNAL MEDICINE

## 2020-01-01 PROCEDURE — 99238 HOSP IP/OBS DSCHRG MGMT 30/<: CPT | Performed by: INTERNAL MEDICINE

## 2020-01-01 PROCEDURE — 84484 ASSAY OF TROPONIN QUANT: CPT | Performed by: PHYSICIAN ASSISTANT

## 2020-01-01 PROCEDURE — 84145 PROCALCITONIN (PCT): CPT | Performed by: STUDENT IN AN ORGANIZED HEALTH CARE EDUCATION/TRAINING PROGRAM

## 2020-01-01 PROCEDURE — 70450 CT HEAD/BRAIN W/O DYE: CPT | Mod: 26 | Performed by: STUDENT IN AN ORGANIZED HEALTH CARE EDUCATION/TRAINING PROGRAM

## 2020-01-01 PROCEDURE — 71250 CT THORAX DX C-: CPT

## 2020-01-01 PROCEDURE — 85027 COMPLETE CBC AUTOMATED: CPT | Performed by: STUDENT IN AN ORGANIZED HEALTH CARE EDUCATION/TRAINING PROGRAM

## 2020-01-01 PROCEDURE — 40000166 ZZH STATISTIC PP CARE STAGE 1

## 2020-01-01 PROCEDURE — 272N000272 HC CONTINUOUS NEBULIZER MICRO PUMP

## 2020-01-01 PROCEDURE — 80053 COMPREHEN METABOLIC PANEL: CPT | Performed by: PHYSICIAN ASSISTANT

## 2020-01-01 PROCEDURE — 87070 CULTURE OTHR SPECIMN AEROBIC: CPT | Performed by: STUDENT IN AN ORGANIZED HEALTH CARE EDUCATION/TRAINING PROGRAM

## 2020-01-01 PROCEDURE — 82947 ASSAY GLUCOSE BLOOD QUANT: CPT | Performed by: STUDENT IN AN ORGANIZED HEALTH CARE EDUCATION/TRAINING PROGRAM

## 2020-01-01 PROCEDURE — 83735 ASSAY OF MAGNESIUM: CPT | Performed by: PHYSICIAN ASSISTANT

## 2020-01-01 PROCEDURE — 85025 COMPLETE CBC W/AUTO DIFF WBC: CPT | Performed by: STUDENT IN AN ORGANIZED HEALTH CARE EDUCATION/TRAINING PROGRAM

## 2020-01-01 PROCEDURE — 70450 CT HEAD/BRAIN W/O DYE: CPT

## 2020-01-01 PROCEDURE — 81001 URINALYSIS AUTO W/SCOPE: CPT | Performed by: INTERNAL MEDICINE

## 2020-01-01 PROCEDURE — 74018 RADEX ABDOMEN 1 VIEW: CPT | Mod: 26 | Performed by: RADIOLOGY

## 2020-01-01 PROCEDURE — 83520 IMMUNOASSAY QUANT NOS NONAB: CPT | Performed by: STUDENT IN AN ORGANIZED HEALTH CARE EDUCATION/TRAINING PROGRAM

## 2020-01-01 PROCEDURE — 84100 ASSAY OF PHOSPHORUS: CPT | Performed by: PHYSICIAN ASSISTANT

## 2020-01-01 PROCEDURE — 99214 OFFICE O/P EST MOD 30 MIN: CPT | Mod: TEL | Performed by: PHYSICIAN ASSISTANT

## 2020-01-01 PROCEDURE — 83690 ASSAY OF LIPASE: CPT | Performed by: PHYSICIAN ASSISTANT

## 2020-01-01 PROCEDURE — 272N000063 HC CIRCUIT HUMID FACE/TRACH MSK

## 2020-01-01 PROCEDURE — 82570 ASSAY OF URINE CREATININE: CPT | Performed by: STUDENT IN AN ORGANIZED HEALTH CARE EDUCATION/TRAINING PROGRAM

## 2020-01-01 PROCEDURE — 80076 HEPATIC FUNCTION PANEL: CPT | Performed by: PHYSICIAN ASSISTANT

## 2020-01-01 PROCEDURE — 84300 ASSAY OF URINE SODIUM: CPT | Performed by: STUDENT IN AN ORGANIZED HEALTH CARE EDUCATION/TRAINING PROGRAM

## 2020-01-01 PROCEDURE — 83605 ASSAY OF LACTIC ACID: CPT

## 2020-01-01 PROCEDURE — 93451 RIGHT HEART CATH: CPT | Mod: 26 | Performed by: INTERNAL MEDICINE

## 2020-01-01 PROCEDURE — 87106 FUNGI IDENTIFICATION YEAST: CPT | Performed by: INTERNAL MEDICINE

## 2020-01-01 PROCEDURE — 74177 CT ABD & PELVIS W/CONTRAST: CPT

## 2020-01-01 PROCEDURE — 87205 SMEAR GRAM STAIN: CPT | Performed by: STUDENT IN AN ORGANIZED HEALTH CARE EDUCATION/TRAINING PROGRAM

## 2020-01-01 PROCEDURE — 85384 FIBRINOGEN ACTIVITY: CPT | Performed by: PHYSICIAN ASSISTANT

## 2020-01-01 PROCEDURE — 999N001064 HC STATISTIC DRUG SCREEN MULTIPLE (METRO)

## 2020-01-01 PROCEDURE — 83520 IMMUNOASSAY QUANT NOS NONAB: CPT | Performed by: INTERNAL MEDICINE

## 2020-01-01 PROCEDURE — 85390 FIBRINOLYSINS SCREEN I&R: CPT | Mod: 26 | Performed by: PATHOLOGY

## 2020-01-01 PROCEDURE — 82805 BLOOD GASES W/O2 SATURATION: CPT | Performed by: STUDENT IN AN ORGANIZED HEALTH CARE EDUCATION/TRAINING PROGRAM

## 2020-01-01 PROCEDURE — 999N000105 HC STATISTIC NO DOCUMENTATION TO SUPPORT CHARGE

## 2020-01-01 PROCEDURE — 70450 CT HEAD/BRAIN W/O DYE: CPT | Mod: 26 | Performed by: RADIOLOGY

## 2020-01-01 PROCEDURE — G0463 HOSPITAL OUTPT CLINIC VISIT: HCPCS | Mod: 25,ZF

## 2020-01-01 PROCEDURE — U0003 INFECTIOUS AGENT DETECTION BY NUCLEIC ACID (DNA OR RNA); SEVERE ACUTE RESPIRATORY SYNDROME CORONAVIRUS 2 (SARS-COV-2) (CORONAVIRUS DISEASE [COVID-19]), AMPLIFIED PROBE TECHNIQUE, MAKING USE OF HIGH THROUGHPUT TECHNOLOGIES AS DESCRIBED BY CMS-2020-01-R: HCPCS | Performed by: STUDENT IN AN ORGANIZED HEALTH CARE EDUCATION/TRAINING PROGRAM

## 2020-01-01 PROCEDURE — 999N001028 HC STATISTIC PTT NC: Performed by: INTERNAL MEDICINE

## 2020-01-01 PROCEDURE — 85379 FIBRIN DEGRADATION QUANT: CPT | Performed by: INTERNAL MEDICINE

## 2020-01-01 PROCEDURE — 85396 CLOTTING ASSAY WHOLE BLOOD: CPT | Performed by: STUDENT IN AN ORGANIZED HEALTH CARE EDUCATION/TRAINING PROGRAM

## 2020-01-01 PROCEDURE — 82330 ASSAY OF CALCIUM: CPT | Performed by: STUDENT IN AN ORGANIZED HEALTH CARE EDUCATION/TRAINING PROGRAM

## 2020-01-01 PROCEDURE — 25000125 ZZHC RX 250: Performed by: INTERNAL MEDICINE

## 2020-01-01 PROCEDURE — 97535 SELF CARE MNGMENT TRAINING: CPT | Mod: GO | Performed by: OCCUPATIONAL THERAPIST

## 2020-01-01 PROCEDURE — 84480 ASSAY TRIIODOTHYRONINE (T3): CPT | Performed by: STUDENT IN AN ORGANIZED HEALTH CARE EDUCATION/TRAINING PROGRAM

## 2020-01-01 PROCEDURE — 93451 RIGHT HEART CATH: CPT | Performed by: INTERNAL MEDICINE

## 2020-01-01 PROCEDURE — P9041 ALBUMIN (HUMAN),5%, 50ML: HCPCS

## 2020-01-01 PROCEDURE — 84100 ASSAY OF PHOSPHORUS: CPT | Performed by: INTERNAL MEDICINE

## 2020-01-01 PROCEDURE — 84439 ASSAY OF FREE THYROXINE: CPT | Performed by: INTERNAL MEDICINE

## 2020-01-01 PROCEDURE — 999N000065 XR ABDOMEN PORT 1 VW

## 2020-01-01 PROCEDURE — 93750 INTERROGATION VAD IN PERSON: CPT | Performed by: INTERNAL MEDICINE

## 2020-01-01 PROCEDURE — 85396 CLOTTING ASSAY WHOLE BLOOD: CPT | Performed by: INTERNAL MEDICINE

## 2020-01-01 PROCEDURE — 93503 INSERT/PLACE HEART CATHETER: CPT | Mod: 59 | Performed by: INTERNAL MEDICINE

## 2020-01-01 PROCEDURE — 99292 CRITICAL CARE ADDL 30 MIN: CPT | Mod: GC | Performed by: INTERNAL MEDICINE

## 2020-01-01 PROCEDURE — 87076 CULTURE ANAEROBE IDENT EACH: CPT | Performed by: STUDENT IN AN ORGANIZED HEALTH CARE EDUCATION/TRAINING PROGRAM

## 2020-01-01 PROCEDURE — 99222 1ST HOSP IP/OBS MODERATE 55: CPT | Performed by: PSYCHIATRY & NEUROLOGY

## 2020-01-01 PROCEDURE — 86900 BLOOD TYPING SEROLOGIC ABO: CPT | Performed by: INTERNAL MEDICINE

## 2020-01-01 PROCEDURE — 99223 1ST HOSP IP/OBS HIGH 75: CPT | Mod: GC | Performed by: INTERNAL MEDICINE

## 2020-01-01 PROCEDURE — 87205 SMEAR GRAM STAIN: CPT | Performed by: INTERNAL MEDICINE

## 2020-01-01 PROCEDURE — 999N000127 HC STATISTIC PERIPHERAL IV START W US GUIDANCE

## 2020-01-01 PROCEDURE — 85730 THROMBOPLASTIN TIME PARTIAL: CPT | Performed by: INTERNAL MEDICINE

## 2020-01-01 PROCEDURE — 84484 ASSAY OF TROPONIN QUANT: CPT | Performed by: NURSE PRACTITIONER

## 2020-01-01 PROCEDURE — 999N001023 HC STATISTIC INR NC: Performed by: INTERNAL MEDICINE

## 2020-01-01 PROCEDURE — 94644 CONT INHLJ TX 1ST HOUR: CPT

## 2020-01-01 PROCEDURE — 999N000185 HC STATISTIC TRANSPORT TIME EA 15 MIN

## 2020-01-01 PROCEDURE — 99233 SBSQ HOSP IP/OBS HIGH 50: CPT | Performed by: INTERNAL MEDICINE

## 2020-01-01 PROCEDURE — 272N000473 HC KIT, VPS RHYTHM STYLET

## 2020-01-01 PROCEDURE — 94002 VENT MGMT INPAT INIT DAY: CPT

## 2020-01-01 PROCEDURE — 71250 CT THORAX DX C-: CPT | Mod: 26 | Performed by: RADIOLOGY

## 2020-01-01 PROCEDURE — 84145 PROCALCITONIN (PCT): CPT | Performed by: PHYSICIAN ASSISTANT

## 2020-01-01 PROCEDURE — 85018 HEMOGLOBIN: CPT | Performed by: STUDENT IN AN ORGANIZED HEALTH CARE EDUCATION/TRAINING PROGRAM

## 2020-01-01 PROCEDURE — C9113 INJ PANTOPRAZOLE SODIUM, VIA: HCPCS

## 2020-01-01 PROCEDURE — 255N000002 HC RX 255 OP 636: Performed by: INTERNAL MEDICINE

## 2020-01-01 PROCEDURE — 85520 HEPARIN ASSAY: CPT | Performed by: PHYSICIAN ASSISTANT

## 2020-01-01 PROCEDURE — 93010 ELECTROCARDIOGRAM REPORT: CPT | Mod: 76 | Performed by: INTERNAL MEDICINE

## 2020-01-01 PROCEDURE — 36415 COLL VENOUS BLD VENIPUNCTURE: CPT

## 2020-01-01 PROCEDURE — 85379 FIBRIN DEGRADATION QUANT: CPT | Performed by: STUDENT IN AN ORGANIZED HEALTH CARE EDUCATION/TRAINING PROGRAM

## 2020-01-01 PROCEDURE — 85245 CLOT FACTOR VIII VW RISTOCTN: CPT | Performed by: INTERNAL MEDICINE

## 2020-01-01 PROCEDURE — 80053 COMPREHEN METABOLIC PANEL: CPT

## 2020-01-01 PROCEDURE — 250N000009 HC RX 250

## 2020-01-01 PROCEDURE — 85576 BLOOD PLATELET AGGREGATION: CPT | Mod: TC | Performed by: INTERNAL MEDICINE

## 2020-01-01 PROCEDURE — 93306 TTE W/DOPPLER COMPLETE: CPT | Mod: 26 | Performed by: INTERNAL MEDICINE

## 2020-01-01 PROCEDURE — 83625 ASSAY OF LDH ENZYMES: CPT | Performed by: PHYSICIAN ASSISTANT

## 2020-01-01 PROCEDURE — 93321 DOPPLER ECHO F-UP/LMTD STD: CPT | Mod: 26 | Performed by: INTERNAL MEDICINE

## 2020-01-01 PROCEDURE — 84100 ASSAY OF PHOSPHORUS: CPT

## 2020-01-01 PROCEDURE — 272N000010 HC KIT CATH ARTERIAL EXT SUPPLY

## 2020-01-01 PROCEDURE — 86140 C-REACTIVE PROTEIN: CPT | Performed by: STUDENT IN AN ORGANIZED HEALTH CARE EDUCATION/TRAINING PROGRAM

## 2020-01-01 PROCEDURE — 272N000459 ZZ HC KIT, 6 FR TL BIOFLO OPEN ENDED PICC

## 2020-01-01 PROCEDURE — 87040 BLOOD CULTURE FOR BACTERIA: CPT | Performed by: STUDENT IN AN ORGANIZED HEALTH CARE EDUCATION/TRAINING PROGRAM

## 2020-01-01 PROCEDURE — 80048 BASIC METABOLIC PNL TOTAL CA: CPT | Performed by: PHYSICIAN ASSISTANT

## 2020-01-01 PROCEDURE — 85384 FIBRINOGEN ACTIVITY: CPT | Performed by: STUDENT IN AN ORGANIZED HEALTH CARE EDUCATION/TRAINING PROGRAM

## 2020-01-01 PROCEDURE — 85018 HEMOGLOBIN: CPT | Performed by: INTERNAL MEDICINE

## 2020-01-01 PROCEDURE — 83615 LACTATE (LD) (LDH) ENZYME: CPT | Performed by: INTERNAL MEDICINE

## 2020-01-01 PROCEDURE — 87040 BLOOD CULTURE FOR BACTERIA: CPT | Performed by: INTERNAL MEDICINE

## 2020-01-01 PROCEDURE — 999N001035 HC STATISTIC THROMBIN TIME NC: Performed by: INTERNAL MEDICINE

## 2020-01-01 PROCEDURE — 250N000012 HC RX MED GY IP 250 OP 636 PS 637: Performed by: PHYSICIAN ASSISTANT

## 2020-01-01 PROCEDURE — 97165 OT EVAL LOW COMPLEX 30 MIN: CPT | Mod: GO | Performed by: OCCUPATIONAL THERAPIST

## 2020-01-01 PROCEDURE — 40001009 ZZH VIDEO/TELEPHONE VISIT; NO CHARGE

## 2020-01-01 PROCEDURE — 272N000435

## 2020-01-01 PROCEDURE — 93970 EXTREMITY STUDY: CPT | Mod: 26 | Performed by: RADIOLOGY

## 2020-01-01 PROCEDURE — 93308 TTE F-UP OR LMTD: CPT | Mod: 26 | Performed by: INTERNAL MEDICINE

## 2020-01-01 PROCEDURE — 74176 CT ABD & PELVIS W/O CONTRAST: CPT

## 2020-01-01 PROCEDURE — 82248 BILIRUBIN DIRECT: CPT | Performed by: PHYSICIAN ASSISTANT

## 2020-01-01 PROCEDURE — 84132 ASSAY OF SERUM POTASSIUM: CPT | Performed by: STUDENT IN AN ORGANIZED HEALTH CARE EDUCATION/TRAINING PROGRAM

## 2020-01-01 PROCEDURE — 85025 COMPLETE CBC W/AUTO DIFF WBC: CPT | Performed by: INTERNAL MEDICINE

## 2020-01-01 PROCEDURE — 99222 1ST HOSP IP/OBS MODERATE 55: CPT | Mod: GC | Performed by: INTERNAL MEDICINE

## 2020-01-01 PROCEDURE — 87040 BLOOD CULTURE FOR BACTERIA: CPT

## 2020-01-01 PROCEDURE — 71260 CT THORAX DX C+: CPT | Mod: 26 | Performed by: RADIOLOGY

## 2020-01-01 PROCEDURE — 83605 ASSAY OF LACTIC ACID: CPT | Performed by: PHYSICIAN ASSISTANT

## 2020-01-01 PROCEDURE — 27210794 ZZH OR GENERAL SUPPLY STERILE: Performed by: INTERNAL MEDICINE

## 2020-01-01 PROCEDURE — 82150 ASSAY OF AMYLASE: CPT | Performed by: STUDENT IN AN ORGANIZED HEALTH CARE EDUCATION/TRAINING PROGRAM

## 2020-01-01 PROCEDURE — 83605 ASSAY OF LACTIC ACID: CPT | Performed by: INTERNAL MEDICINE

## 2020-01-01 PROCEDURE — 93306 TTE W/DOPPLER COMPLETE: CPT | Mod: 26 | Performed by: STUDENT IN AN ORGANIZED HEALTH CARE EDUCATION/TRAINING PROGRAM

## 2020-01-01 PROCEDURE — 258N000003 HC RX IP 258 OP 636

## 2020-01-01 PROCEDURE — 80048 BASIC METABOLIC PNL TOTAL CA: CPT | Performed by: INTERNAL MEDICINE

## 2020-01-01 PROCEDURE — 85240 CLOT FACTOR VIII AHG 1 STAGE: CPT | Performed by: INTERNAL MEDICINE

## 2020-01-01 PROCEDURE — P9016 RBC LEUKOCYTES REDUCED: HCPCS | Performed by: INTERNAL MEDICINE

## 2020-01-01 PROCEDURE — 87040 BLOOD CULTURE FOR BACTERIA: CPT | Performed by: PHYSICIAN ASSISTANT

## 2020-01-01 PROCEDURE — 93325 DOPPLER ECHO COLOR FLOW MAPG: CPT

## 2020-01-01 PROCEDURE — 83036 HEMOGLOBIN GLYCOSYLATED A1C: CPT | Performed by: STUDENT IN AN ORGANIZED HEALTH CARE EDUCATION/TRAINING PROGRAM

## 2020-01-01 PROCEDURE — 93325 DOPPLER ECHO COLOR FLOW MAPG: CPT | Mod: 26 | Performed by: INTERNAL MEDICINE

## 2020-01-01 PROCEDURE — 84480 ASSAY TRIIODOTHYRONINE (T3): CPT | Performed by: INTERNAL MEDICINE

## 2020-01-01 PROCEDURE — 86850 RBC ANTIBODY SCREEN: CPT | Performed by: INTERNAL MEDICINE

## 2020-01-01 PROCEDURE — 80202 ASSAY OF VANCOMYCIN: CPT | Performed by: INTERNAL MEDICINE

## 2020-01-01 PROCEDURE — P9047 ALBUMIN (HUMAN), 25%, 50ML: HCPCS | Performed by: STUDENT IN AN ORGANIZED HEALTH CARE EDUCATION/TRAINING PROGRAM

## 2020-01-01 PROCEDURE — 86665 EPSTEIN-BARR CAPSID VCA: CPT | Performed by: INTERNAL MEDICINE

## 2020-01-01 PROCEDURE — 87640 STAPH A DNA AMP PROBE: CPT | Performed by: INTERNAL MEDICINE

## 2020-01-01 PROCEDURE — 86780 TREPONEMA PALLIDUM: CPT | Performed by: INTERNAL MEDICINE

## 2020-01-01 PROCEDURE — XW043E5 INTRODUCTION OF REMDESIVIR ANTI-INFECTIVE INTO CENTRAL VEIN, PERCUTANEOUS APPROACH, NEW TECHNOLOGY GROUP 5: ICD-10-PCS | Performed by: INTERNAL MEDICINE

## 2020-01-01 PROCEDURE — 71275 CT ANGIOGRAPHY CHEST: CPT | Mod: 26 | Performed by: RADIOLOGY

## 2020-01-01 PROCEDURE — 85247 CLOT FACTOR VIII MULTIMETRIC: CPT | Performed by: INTERNAL MEDICINE

## 2020-01-01 PROCEDURE — 99231 SBSQ HOSP IP/OBS SF/LOW 25: CPT | Mod: GC | Performed by: INTERNAL MEDICINE

## 2020-01-01 PROCEDURE — 87493 C DIFF AMPLIFIED PROBE: CPT | Performed by: INTERNAL MEDICINE

## 2020-01-01 PROCEDURE — 71275 CT ANGIOGRAPHY CHEST: CPT | Mod: 26 | Performed by: STUDENT IN AN ORGANIZED HEALTH CARE EDUCATION/TRAINING PROGRAM

## 2020-01-01 PROCEDURE — 99207 PR CDG-MDM COMPONENT: MEETS HIGH - UP CODED: CPT | Performed by: PHYSICIAN ASSISTANT

## 2020-01-01 PROCEDURE — 83690 ASSAY OF LIPASE: CPT | Performed by: STUDENT IN AN ORGANIZED HEALTH CARE EDUCATION/TRAINING PROGRAM

## 2020-01-01 PROCEDURE — 93295 DEV INTERROG REMOTE 1/2/MLT: CPT | Performed by: INTERNAL MEDICINE

## 2020-01-01 PROCEDURE — 84481 FREE ASSAY (FT-3): CPT | Performed by: INTERNAL MEDICINE

## 2020-01-01 PROCEDURE — 85520 HEPARIN ASSAY: CPT | Performed by: NURSE PRACTITIONER

## 2020-01-01 PROCEDURE — 999N001037 HC STATISTIC VON WILLEBRAND MULTIMERS: Performed by: INTERNAL MEDICINE

## 2020-01-01 PROCEDURE — 74176 CT ABD & PELVIS W/O CONTRAST: CPT | Mod: 26 | Performed by: RADIOLOGY

## 2020-01-01 PROCEDURE — 36600 WITHDRAWAL OF ARTERIAL BLOOD: CPT

## 2020-01-01 PROCEDURE — 76770 US EXAM ABDO BACK WALL COMP: CPT | Mod: 26 | Performed by: RADIOLOGY

## 2020-01-01 PROCEDURE — 80307 DRUG TEST PRSMV CHEM ANLYZR: CPT

## 2020-01-01 PROCEDURE — 120N000003 HC R&B IMCU UMMC

## 2020-01-01 PROCEDURE — 93296 REM INTERROG EVL PM/IDS: CPT | Mod: ZF

## 2020-01-01 PROCEDURE — 99214 OFFICE O/P EST MOD 30 MIN: CPT | Mod: GT | Performed by: FAMILY MEDICINE

## 2020-01-01 PROCEDURE — 82810 BLOOD GASES O2 SAT ONLY: CPT | Performed by: STUDENT IN AN ORGANIZED HEALTH CARE EDUCATION/TRAINING PROGRAM

## 2020-01-01 PROCEDURE — 93750 INTERROGATION VAD IN PERSON: CPT | Mod: ZF | Performed by: PHYSICIAN ASSISTANT

## 2020-01-01 PROCEDURE — 71045 X-RAY EXAM CHEST 1 VIEW: CPT | Mod: 76

## 2020-01-01 PROCEDURE — 40000866 ZZHCL STATISTIC HIV 1/2 ANTIGEN/ANTIBODY PRETRANSPLANT ONLY: Performed by: INTERNAL MEDICINE

## 2020-01-01 PROCEDURE — 84484 ASSAY OF TROPONIN QUANT: CPT | Performed by: STUDENT IN AN ORGANIZED HEALTH CARE EDUCATION/TRAINING PROGRAM

## 2020-01-01 RX ORDER — DEXTROSE MONOHYDRATE 25 G/50ML
25-50 INJECTION, SOLUTION INTRAVENOUS
Status: DISCONTINUED | OUTPATIENT
Start: 2020-01-01 | End: 2020-01-01

## 2020-01-01 RX ORDER — LORAZEPAM 1 MG/1
1 TABLET ORAL EVERY 6 HOURS
Status: DISCONTINUED | OUTPATIENT
Start: 2020-01-01 | End: 2020-01-01

## 2020-01-01 RX ORDER — HEPARIN SODIUM,PORCINE 10 UNIT/ML
5-10 VIAL (ML) INTRAVENOUS
Status: DISCONTINUED | OUTPATIENT
Start: 2020-01-01 | End: 2020-01-01 | Stop reason: HOSPADM

## 2020-01-01 RX ORDER — COLCHICINE 0.6 MG/1
TABLET ORAL
Qty: 30 TABLET | Refills: 2 | OUTPATIENT
Start: 2020-01-01

## 2020-01-01 RX ORDER — PIPERACILLIN SODIUM, TAZOBACTAM SODIUM 4; .5 G/20ML; G/20ML
4.5 INJECTION, POWDER, LYOPHILIZED, FOR SOLUTION INTRAVENOUS EVERY 6 HOURS
Status: COMPLETED | OUTPATIENT
Start: 2020-01-01 | End: 2020-01-01

## 2020-01-01 RX ORDER — ACETAMINOPHEN 325 MG/1
975 TABLET ORAL ONCE
Status: COMPLETED | OUTPATIENT
Start: 2020-01-01 | End: 2020-01-01

## 2020-01-01 RX ORDER — ADENOSINE 3 MG/ML
INJECTION, SOLUTION INTRAVENOUS
Status: COMPLETED
Start: 2020-01-01 | End: 2020-01-01

## 2020-01-01 RX ORDER — PHENYLEPHRINE HCL IN 0.9% NACL 50MG/250ML
0.5-6 PLASTIC BAG, INJECTION (ML) INTRAVENOUS CONTINUOUS
Status: DISCONTINUED | OUTPATIENT
Start: 2020-01-01 | End: 2020-01-01 | Stop reason: HOSPADM

## 2020-01-01 RX ORDER — NOREPINEPHRINE BITARTRATE 0.06 MG/ML
0.03-0.4 INJECTION, SOLUTION INTRAVENOUS CONTINUOUS
Status: DISCONTINUED | OUTPATIENT
Start: 2020-01-01 | End: 2020-01-01

## 2020-01-01 RX ORDER — HEPARIN SODIUM 10000 [USP'U]/100ML
0-5000 INJECTION, SOLUTION INTRAVENOUS CONTINUOUS
Status: DISCONTINUED | OUTPATIENT
Start: 2020-01-01 | End: 2020-01-01

## 2020-01-01 RX ORDER — MAGNESIUM SULFATE HEPTAHYDRATE 40 MG/ML
2 INJECTION, SOLUTION INTRAVENOUS ONCE
Status: COMPLETED | OUTPATIENT
Start: 2020-01-01 | End: 2020-01-01

## 2020-01-01 RX ORDER — FENTANYL CITRATE 50 UG/ML
INJECTION, SOLUTION INTRAMUSCULAR; INTRAVENOUS
Status: COMPLETED
Start: 2020-01-01 | End: 2020-01-01

## 2020-01-01 RX ORDER — DEXMEDETOMIDINE HYDROCHLORIDE 4 UG/ML
.2-1.2 INJECTION, SOLUTION INTRAVENOUS CONTINUOUS
Status: DISCONTINUED | OUTPATIENT
Start: 2020-01-01 | End: 2020-01-01 | Stop reason: HOSPADM

## 2020-01-01 RX ORDER — IOPAMIDOL 755 MG/ML
126 INJECTION, SOLUTION INTRAVASCULAR ONCE
Status: DISCONTINUED | OUTPATIENT
Start: 2020-01-01 | End: 2020-01-01

## 2020-01-01 RX ORDER — FENTANYL CITRATE 50 UG/ML
25-50 INJECTION, SOLUTION INTRAMUSCULAR; INTRAVENOUS
Status: DISCONTINUED | OUTPATIENT
Start: 2020-01-01 | End: 2020-01-01 | Stop reason: HOSPADM

## 2020-01-01 RX ORDER — BUMETANIDE 0.25 MG/ML
2 INJECTION INTRAMUSCULAR; INTRAVENOUS ONCE
Status: COMPLETED | OUTPATIENT
Start: 2020-01-01 | End: 2020-01-01

## 2020-01-01 RX ORDER — POTASSIUM CHLORIDE 1.5 G/1.58G
20 POWDER, FOR SOLUTION ORAL ONCE
Status: COMPLETED | OUTPATIENT
Start: 2020-01-01 | End: 2020-01-01

## 2020-01-01 RX ORDER — AMOXICILLIN 250 MG
2 CAPSULE ORAL AT BEDTIME
Status: DISCONTINUED | OUTPATIENT
Start: 2020-01-01 | End: 2020-01-01 | Stop reason: HOSPADM

## 2020-01-01 RX ORDER — NICOTINE POLACRILEX 4 MG
15-30 LOZENGE BUCCAL
Status: DISCONTINUED | OUTPATIENT
Start: 2020-01-01 | End: 2020-01-01

## 2020-01-01 RX ORDER — LORAZEPAM 1 MG/1
1 TABLET ORAL EVERY 12 HOURS PRN
Status: DISCONTINUED | OUTPATIENT
Start: 2020-01-01 | End: 2020-01-01

## 2020-01-01 RX ORDER — LOSARTAN POTASSIUM 25 MG/1
25 TABLET ORAL DAILY
Qty: 90 TABLET | Refills: 0 | Status: SHIPPED | OUTPATIENT
Start: 2020-01-01 | End: 2020-01-01

## 2020-01-01 RX ORDER — NOREPINEPHRINE BITARTRATE 0.06 MG/ML
INJECTION, SOLUTION INTRAVENOUS
Status: COMPLETED
Start: 2020-01-01 | End: 2020-01-01

## 2020-01-01 RX ORDER — FUROSEMIDE 10 MG/ML
40 INJECTION INTRAMUSCULAR; INTRAVENOUS ONCE
Status: DISCONTINUED | OUTPATIENT
Start: 2020-01-01 | End: 2020-01-01

## 2020-01-01 RX ORDER — AMIODARONE HYDROCHLORIDE 200 MG/1
200 TABLET ORAL DAILY
Qty: 90 TABLET | Refills: 3 | COMMUNITY
Start: 2020-01-01 | End: 2020-01-01

## 2020-01-01 RX ORDER — NALOXONE HYDROCHLORIDE 0.4 MG/ML
0.4 INJECTION, SOLUTION INTRAMUSCULAR; INTRAVENOUS; SUBCUTANEOUS
Status: DISCONTINUED | OUTPATIENT
Start: 2020-01-01 | End: 2020-01-01 | Stop reason: HOSPADM

## 2020-01-01 RX ORDER — FENTANYL CITRATE-0.9 % NACL/PF 10 MCG/ML
800 PLASTIC BAG, INJECTION (ML) INTRAVENOUS ONCE
Status: COMPLETED | OUTPATIENT
Start: 2020-01-01 | End: 2020-01-01

## 2020-01-01 RX ORDER — DIGOXIN 0.25 MG/ML
250 INJECTION INTRAMUSCULAR; INTRAVENOUS EVERY 6 HOURS
Status: DISCONTINUED | OUTPATIENT
Start: 2020-01-01 | End: 2020-01-01 | Stop reason: HOSPADM

## 2020-01-01 RX ORDER — FUROSEMIDE 10 MG/ML
80 INJECTION INTRAMUSCULAR; INTRAVENOUS 2 TIMES DAILY
Status: DISCONTINUED | OUTPATIENT
Start: 2020-01-01 | End: 2020-01-01 | Stop reason: HOSPADM

## 2020-01-01 RX ORDER — SODIUM CHLORIDE, CALCIUM CHLORIDE, AND POTASSIUM CHLORIDE .86; .033; .03 G/100ML; G/100ML; G/100ML
INJECTION, SOLUTION INTRAVENOUS CONTINUOUS
Status: DISCONTINUED | OUTPATIENT
Start: 2020-01-01 | End: 2020-01-01

## 2020-01-01 RX ORDER — IOPAMIDOL 755 MG/ML
100 INJECTION, SOLUTION INTRAVASCULAR ONCE
Status: COMPLETED | OUTPATIENT
Start: 2020-01-01 | End: 2020-01-01

## 2020-01-01 RX ORDER — LOSARTAN POTASSIUM 25 MG/1
25 TABLET ORAL DAILY
Status: DISCONTINUED | OUTPATIENT
Start: 2020-01-01 | End: 2020-01-01

## 2020-01-01 RX ORDER — AMOXICILLIN 500 MG/1
2000 CAPSULE ORAL
Qty: 4 CAPSULE | Refills: 0 | Status: SHIPPED | OUTPATIENT
Start: 2020-01-01

## 2020-01-01 RX ORDER — LANOLIN ALCOHOL/MO/W.PET/CERES
100 CREAM (GRAM) TOPICAL DAILY
Status: DISCONTINUED | OUTPATIENT
Start: 2020-01-01 | End: 2020-01-01 | Stop reason: HOSPADM

## 2020-01-01 RX ORDER — LORAZEPAM 1 MG/1
1 TABLET ORAL EVERY 12 HOURS
Status: DISCONTINUED | OUTPATIENT
Start: 2020-01-01 | End: 2020-01-01 | Stop reason: HOSPADM

## 2020-01-01 RX ORDER — DOXYCYCLINE 100 MG/1
100 CAPSULE ORAL 2 TIMES DAILY
Qty: 28 CAPSULE | Refills: 0 | Status: SHIPPED | OUTPATIENT
Start: 2020-01-01 | End: 2020-01-01

## 2020-01-01 RX ORDER — BUMETANIDE 0.25 MG/ML
4 INJECTION INTRAMUSCULAR; INTRAVENOUS ONCE
Status: DISCONTINUED | OUTPATIENT
Start: 2020-01-01 | End: 2020-01-01

## 2020-01-01 RX ORDER — LORAZEPAM 2 MG/ML
.5-2 INJECTION INTRAMUSCULAR EVERY 4 HOURS PRN
Status: DISCONTINUED | OUTPATIENT
Start: 2020-01-01 | End: 2020-01-01

## 2020-01-01 RX ORDER — POTASSIUM CHLORIDE 750 MG/1
20 TABLET, EXTENDED RELEASE ORAL 2 TIMES DAILY
Qty: 360 TABLET | Refills: 3 | Status: SHIPPED | OUTPATIENT
Start: 2020-01-01

## 2020-01-01 RX ORDER — HEPARIN SODIUM,PORCINE 10 UNIT/ML
5-10 VIAL (ML) INTRAVENOUS EVERY 24 HOURS
Status: DISCONTINUED | OUTPATIENT
Start: 2020-01-01 | End: 2020-01-01 | Stop reason: HOSPADM

## 2020-01-01 RX ORDER — DEXTROSE MONOHYDRATE 25 G/50ML
25-50 INJECTION, SOLUTION INTRAVENOUS
Status: DISCONTINUED | OUTPATIENT
Start: 2020-01-01 | End: 2020-01-01 | Stop reason: HOSPADM

## 2020-01-01 RX ORDER — LORAZEPAM 2 MG/1
4 TABLET ORAL EVERY 6 HOURS
Status: DISCONTINUED | OUTPATIENT
Start: 2020-01-01 | End: 2020-01-01

## 2020-01-01 RX ORDER — AMINO AC/PROTEIN HYDR/WHEY PRO 10G-100/30
1 LIQUID (ML) ORAL 2 TIMES DAILY
Status: DISCONTINUED | OUTPATIENT
Start: 2020-01-01 | End: 2020-01-01 | Stop reason: HOSPADM

## 2020-01-01 RX ORDER — NOREPINEPHRINE BITARTRATE 0.06 MG/ML
.03-.4 INJECTION, SOLUTION INTRAVENOUS CONTINUOUS
Status: DISCONTINUED | OUTPATIENT
Start: 2020-01-01 | End: 2020-01-01

## 2020-01-01 RX ORDER — COLCHICINE 0.6 MG/1
0.6 TABLET ORAL DAILY
Qty: 30 TABLET | Refills: 2 | Status: SHIPPED | OUTPATIENT
Start: 2020-01-01 | End: 2020-01-01

## 2020-01-01 RX ORDER — ALLOPURINOL 100 MG/1
200 TABLET ORAL DAILY
Qty: 180 TABLET | Refills: 1 | Status: SHIPPED | OUTPATIENT
Start: 2020-01-01 | End: 2020-01-01

## 2020-01-01 RX ORDER — IOPAMIDOL 755 MG/ML
65 INJECTION, SOLUTION INTRAVASCULAR ONCE
Status: DISCONTINUED | OUTPATIENT
Start: 2020-01-01 | End: 2020-01-01

## 2020-01-01 RX ORDER — FENTANYL CITRATE 50 UG/ML
25 INJECTION, SOLUTION INTRAMUSCULAR; INTRAVENOUS ONCE
Status: COMPLETED | OUTPATIENT
Start: 2020-01-01 | End: 2020-01-01

## 2020-01-01 RX ORDER — POTASSIUM CHLORIDE 29.8 MG/ML
20 INJECTION INTRAVENOUS ONCE
Status: COMPLETED | OUTPATIENT
Start: 2020-01-01 | End: 2020-01-01

## 2020-01-01 RX ORDER — OXYCODONE HYDROCHLORIDE 10 MG/1
10 TABLET ORAL EVERY 4 HOURS
Status: DISCONTINUED | OUTPATIENT
Start: 2020-01-01 | End: 2020-01-01

## 2020-01-01 RX ORDER — OXYCODONE HYDROCHLORIDE 5 MG/1
5 TABLET ORAL EVERY 12 HOURS
Status: DISCONTINUED | OUTPATIENT
Start: 2020-01-01 | End: 2020-01-01 | Stop reason: HOSPADM

## 2020-01-01 RX ORDER — NOREPINEPHRINE BITARTRATE 0.06 MG/ML
INJECTION, SOLUTION INTRAVENOUS
Status: DISCONTINUED
Start: 2020-01-01 | End: 2020-01-01 | Stop reason: HOSPADM

## 2020-01-01 RX ORDER — ACETAMINOPHEN 325 MG/1
650 TABLET ORAL EVERY 4 HOURS PRN
Status: DISCONTINUED | OUTPATIENT
Start: 2020-01-01 | End: 2020-01-01

## 2020-01-01 RX ORDER — ALBUMIN, HUMAN INJ 5% 5 %
SOLUTION INTRAVENOUS
Status: DISCONTINUED
Start: 2020-01-01 | End: 2020-01-01 | Stop reason: HOSPADM

## 2020-01-01 RX ORDER — HEPARIN SODIUM,PORCINE 10 UNIT/ML
2-5 VIAL (ML) INTRAVENOUS
Status: DISCONTINUED | OUTPATIENT
Start: 2020-01-01 | End: 2020-01-01 | Stop reason: HOSPADM

## 2020-01-01 RX ORDER — LIDOCAINE 40 MG/G
CREAM TOPICAL
Status: DISCONTINUED | OUTPATIENT
Start: 2020-01-01 | End: 2020-01-01 | Stop reason: HOSPADM

## 2020-01-01 RX ORDER — DEXMEDETOMIDINE HYDROCHLORIDE 4 UG/ML
0.2-0.7 INJECTION, SOLUTION INTRAVENOUS CONTINUOUS
Status: DISCONTINUED | OUTPATIENT
Start: 2020-01-01 | End: 2020-01-01

## 2020-01-01 RX ORDER — COLCHICINE 0.6 MG/1
0.6 TABLET ORAL DAILY
Qty: 30 TABLET | Refills: 0 | Status: SHIPPED | OUTPATIENT
Start: 2020-01-01 | End: 2020-01-01

## 2020-01-01 RX ORDER — DEXMEDETOMIDINE HYDROCHLORIDE 4 UG/ML
0.2-0.7 INJECTION, SOLUTION INTRAVENOUS CONTINUOUS
Status: CANCELLED | OUTPATIENT
Start: 2020-01-01

## 2020-01-01 RX ORDER — POLYETHYLENE GLYCOL 3350 17 G/17G
17 POWDER, FOR SOLUTION ORAL DAILY PRN
Status: DISCONTINUED | OUTPATIENT
Start: 2020-01-01 | End: 2020-01-01 | Stop reason: HOSPADM

## 2020-01-01 RX ORDER — ALBUMIN, HUMAN INJ 5% 5 %
SOLUTION INTRAVENOUS
Status: COMPLETED
Start: 2020-01-01 | End: 2020-01-01

## 2020-01-01 RX ORDER — AMIODARONE HYDROCHLORIDE 200 MG/1
200 TABLET ORAL DAILY
Qty: 90 TABLET | Refills: 3 | Status: SHIPPED | OUTPATIENT
Start: 2020-01-01

## 2020-01-01 RX ORDER — DIGOXIN 0.25 MG/ML
500 INJECTION INTRAMUSCULAR; INTRAVENOUS ONCE
Status: COMPLETED | OUTPATIENT
Start: 2020-01-01 | End: 2020-01-01

## 2020-01-01 RX ORDER — COLCHICINE 0.6 MG/1
0.6 TABLET ORAL DAILY
Qty: 30 TABLET | Refills: 0 | OUTPATIENT
Start: 2020-01-01

## 2020-01-01 RX ORDER — NOREPINEPHRINE BITARTRATE 0.06 MG/ML
.03-.4 INJECTION, SOLUTION INTRAVENOUS CONTINUOUS
Status: DISCONTINUED | OUTPATIENT
Start: 2020-01-01 | End: 2020-01-01 | Stop reason: HOSPADM

## 2020-01-01 RX ORDER — LORAZEPAM 2 MG/ML
.5-1 INJECTION INTRAMUSCULAR EVERY 4 HOURS PRN
Status: DISCONTINUED | OUTPATIENT
Start: 2020-01-01 | End: 2020-01-01

## 2020-01-01 RX ORDER — IOPAMIDOL 755 MG/ML
126 INJECTION, SOLUTION INTRAVASCULAR ONCE
Status: CANCELLED | OUTPATIENT
Start: 2020-01-01

## 2020-01-01 RX ORDER — DEXTROSE MONOHYDRATE 100 MG/ML
INJECTION, SOLUTION INTRAVENOUS CONTINUOUS PRN
Status: DISCONTINUED | OUTPATIENT
Start: 2020-01-01 | End: 2020-01-01 | Stop reason: HOSPADM

## 2020-01-01 RX ORDER — NALOXONE HYDROCHLORIDE 0.4 MG/ML
0.2 INJECTION, SOLUTION INTRAMUSCULAR; INTRAVENOUS; SUBCUTANEOUS
Status: DISCONTINUED | OUTPATIENT
Start: 2020-01-01 | End: 2020-01-01 | Stop reason: HOSPADM

## 2020-01-01 RX ORDER — BUMETANIDE 1 MG/1
2 TABLET ORAL DAILY
Qty: 360 TABLET | Refills: 3 | COMMUNITY
Start: 2020-01-01 | End: 2020-01-01

## 2020-01-01 RX ORDER — AMOXICILLIN 250 MG
2 CAPSULE ORAL 2 TIMES DAILY PRN
Status: DISCONTINUED | OUTPATIENT
Start: 2020-01-01 | End: 2020-01-01 | Stop reason: HOSPADM

## 2020-01-01 RX ORDER — BISACODYL 10 MG
10 SUPPOSITORY, RECTAL RECTAL DAILY PRN
Status: DISCONTINUED | OUTPATIENT
Start: 2020-01-01 | End: 2020-01-01 | Stop reason: HOSPADM

## 2020-01-01 RX ORDER — FUROSEMIDE 10 MG/ML
40 INJECTION INTRAMUSCULAR; INTRAVENOUS ONCE
Status: COMPLETED | OUTPATIENT
Start: 2020-01-01 | End: 2020-01-01

## 2020-01-01 RX ORDER — BUMETANIDE 1 MG/1
2 TABLET ORAL 2 TIMES DAILY
Qty: 180 TABLET | Refills: 3 | COMMUNITY
Start: 2020-01-01

## 2020-01-01 RX ORDER — AZITHROMYCIN 250 MG/1
500 TABLET, FILM COATED ORAL ONCE
Status: COMPLETED | OUTPATIENT
Start: 2020-01-01 | End: 2020-01-01

## 2020-01-01 RX ORDER — LIDOCAINE 40 MG/G
CREAM TOPICAL
Status: COMPLETED | OUTPATIENT
Start: 2020-01-01 | End: 2020-01-01

## 2020-01-01 RX ORDER — ASPIRIN 81 MG/1
162 TABLET, CHEWABLE ORAL ONCE
Status: COMPLETED | OUTPATIENT
Start: 2020-01-01 | End: 2020-01-01

## 2020-01-01 RX ORDER — SPIRONOLACTONE 25 MG/1
37.5 TABLET ORAL DAILY
Qty: 135 TABLET | Refills: 3 | Status: SHIPPED | OUTPATIENT
Start: 2020-01-01

## 2020-01-01 RX ORDER — FUROSEMIDE 10 MG/ML
20 INJECTION INTRAMUSCULAR; INTRAVENOUS 2 TIMES DAILY
Status: DISCONTINUED | OUTPATIENT
Start: 2020-01-01 | End: 2020-01-01

## 2020-01-01 RX ORDER — DOBUTAMINE HCL IN DEXTROSE 5 % 500MG/250
5 INTRAVENOUS SOLUTION INTRAVENOUS CONTINUOUS
Status: DISCONTINUED | OUTPATIENT
Start: 2020-01-01 | End: 2020-01-01 | Stop reason: HOSPADM

## 2020-01-01 RX ORDER — IOPAMIDOL 755 MG/ML
123 INJECTION, SOLUTION INTRAVASCULAR ONCE
Status: COMPLETED | OUTPATIENT
Start: 2020-01-01 | End: 2020-01-01

## 2020-01-01 RX ORDER — SODIUM CHLORIDE 9 MG/ML
INJECTION, SOLUTION INTRAVENOUS CONTINUOUS
Status: DISCONTINUED | OUTPATIENT
Start: 2020-01-01 | End: 2020-01-01

## 2020-01-01 RX ORDER — ALLOPURINOL 100 MG/1
200 TABLET ORAL DAILY
Qty: 180 TABLET | Refills: 1 | Status: SHIPPED | OUTPATIENT
Start: 2020-01-01

## 2020-01-01 RX ORDER — OXYCODONE HYDROCHLORIDE 5 MG/1
5 TABLET ORAL EVERY 4 HOURS
Status: DISCONTINUED | OUTPATIENT
Start: 2020-01-01 | End: 2020-01-01

## 2020-01-01 RX ORDER — PHENYLEPHRINE HCL IN 0.9% NACL 50MG/250ML
0.5-6 PLASTIC BAG, INJECTION (ML) INTRAVENOUS CONTINUOUS
Status: DISCONTINUED | OUTPATIENT
Start: 2020-01-01 | End: 2020-01-01

## 2020-01-01 RX ORDER — LORAZEPAM 2 MG/1
4 TABLET ORAL EVERY 6 HOURS
Status: CANCELLED | OUTPATIENT
Start: 2020-01-01

## 2020-01-01 RX ORDER — AMOXICILLIN 250 MG
1 CAPSULE ORAL 2 TIMES DAILY PRN
Status: DISCONTINUED | OUTPATIENT
Start: 2020-01-01 | End: 2020-01-01 | Stop reason: HOSPADM

## 2020-01-01 RX ORDER — POTASSIUM CHLORIDE 29.8 MG/ML
20 INJECTION INTRAVENOUS ONCE
Status: DISCONTINUED | OUTPATIENT
Start: 2020-01-01 | End: 2020-01-01

## 2020-01-01 RX ORDER — AMIODARONE HYDROCHLORIDE 200 MG/1
200 TABLET ORAL DAILY
Qty: 90 TABLET | Refills: 3 | OUTPATIENT
Start: 2020-01-01

## 2020-01-01 RX ORDER — AMOXICILLIN 250 MG
1 CAPSULE ORAL AT BEDTIME
Status: DISCONTINUED | OUTPATIENT
Start: 2020-01-01 | End: 2020-01-01

## 2020-01-01 RX ORDER — MORPHINE SULFATE 2 MG/ML
1-2 INJECTION, SOLUTION INTRAMUSCULAR; INTRAVENOUS
Status: DISCONTINUED | OUTPATIENT
Start: 2020-01-01 | End: 2020-01-01 | Stop reason: HOSPADM

## 2020-01-01 RX ORDER — ACETAMINOPHEN 650 MG/1
650 SUPPOSITORY RECTAL EVERY 6 HOURS PRN
Status: DISCONTINUED | OUTPATIENT
Start: 2020-01-01 | End: 2020-01-01 | Stop reason: HOSPADM

## 2020-01-01 RX ORDER — ASPIRIN 81 MG/1
162 TABLET, CHEWABLE ORAL 2 TIMES DAILY
Status: DISCONTINUED | OUTPATIENT
Start: 2020-01-01 | End: 2020-01-01 | Stop reason: HOSPADM

## 2020-01-01 RX ORDER — FENTANYL CITRATE-0.9 % NACL/PF 10 MCG/ML
200 PLASTIC BAG, INJECTION (ML) INTRAVENOUS ONCE
Status: COMPLETED | OUTPATIENT
Start: 2020-01-01 | End: 2020-01-01

## 2020-01-01 RX ORDER — LIDOCAINE 4 G/G
1 PATCH TOPICAL
Status: DISCONTINUED | OUTPATIENT
Start: 2020-01-01 | End: 2020-01-01 | Stop reason: HOSPADM

## 2020-01-01 RX ORDER — PANTOPRAZOLE SODIUM 20 MG/1
20 TABLET, DELAYED RELEASE ORAL
Status: DISCONTINUED | OUTPATIENT
Start: 2020-01-01 | End: 2020-01-01

## 2020-01-01 RX ORDER — FENTANYL CITRATE 50 UG/ML
50 INJECTION, SOLUTION INTRAMUSCULAR; INTRAVENOUS
Status: DISCONTINUED | OUTPATIENT
Start: 2020-01-01 | End: 2020-01-01

## 2020-01-01 RX ORDER — METOPROLOL TARTRATE 1 MG/ML
5 INJECTION, SOLUTION INTRAVENOUS ONCE
Status: COMPLETED | OUTPATIENT
Start: 2020-01-01 | End: 2020-01-01

## 2020-01-01 RX ORDER — ASPIRIN 325 MG
325 TABLET ORAL ONCE
Status: COMPLETED | OUTPATIENT
Start: 2020-01-01 | End: 2020-01-01

## 2020-01-01 RX ORDER — COLCHICINE 0.6 MG/1
0.6 TABLET ORAL 2 TIMES DAILY
Qty: 60 TABLET | Refills: 0 | OUTPATIENT
Start: 2020-01-01

## 2020-01-01 RX ORDER — MIDAZOLAM (PF) 1 MG/ML IN 0.9 % SODIUM CHLORIDE INTRAVENOUS SOLUTION
1-8 CONTINUOUS
Status: DISCONTINUED | OUTPATIENT
Start: 2020-01-01 | End: 2020-01-01

## 2020-01-01 RX ORDER — ALBUMIN, HUMAN INJ 5% 5 %
25 SOLUTION INTRAVENOUS ONCE
Status: COMPLETED | OUTPATIENT
Start: 2020-01-01 | End: 2020-01-01

## 2020-01-01 RX ORDER — AZITHROMYCIN 250 MG/1
250 TABLET, FILM COATED ORAL DAILY
Status: DISCONTINUED | OUTPATIENT
Start: 2020-01-01 | End: 2020-01-01

## 2020-01-01 RX ORDER — PROPOFOL 10 MG/ML
5-75 INJECTION, EMULSION INTRAVENOUS CONTINUOUS
Status: DISCONTINUED | OUTPATIENT
Start: 2020-01-01 | End: 2020-01-01

## 2020-01-01 RX ORDER — AMIODARONE HYDROCHLORIDE 200 MG/1
200 TABLET ORAL DAILY
Status: DISCONTINUED | OUTPATIENT
Start: 2020-01-01 | End: 2020-01-01

## 2020-01-01 RX ORDER — PIPERACILLIN SODIUM, TAZOBACTAM SODIUM 4; .5 G/20ML; G/20ML
4.5 INJECTION, POWDER, LYOPHILIZED, FOR SOLUTION INTRAVENOUS EVERY 6 HOURS
Status: DISCONTINUED | OUTPATIENT
Start: 2020-01-01 | End: 2020-01-01 | Stop reason: HOSPADM

## 2020-01-01 RX ORDER — COLCHICINE 0.6 MG/1
0.6 TABLET ORAL 2 TIMES DAILY
Qty: 180 TABLET | Refills: 1 | OUTPATIENT
Start: 2020-01-01

## 2020-01-01 RX ORDER — MIDAZOLAM (PF) 1 MG/ML IN 0.9 % SODIUM CHLORIDE INTRAVENOUS SOLUTION
1-8 CONTINUOUS
Status: DISCONTINUED | OUTPATIENT
Start: 2020-01-01 | End: 2020-01-01 | Stop reason: HOSPADM

## 2020-01-01 RX ORDER — FUROSEMIDE 10 MG/ML
40 INJECTION INTRAMUSCULAR; INTRAVENOUS 2 TIMES DAILY
Status: DISCONTINUED | OUTPATIENT
Start: 2020-01-01 | End: 2020-01-01

## 2020-01-01 RX ORDER — ALBUMIN (HUMAN) 12.5 G/50ML
SOLUTION INTRAVENOUS
Status: DISCONTINUED
Start: 2020-01-01 | End: 2020-01-01 | Stop reason: HOSPADM

## 2020-01-01 RX ORDER — LIDOCAINE 40 MG/G
CREAM TOPICAL
Status: CANCELLED | OUTPATIENT
Start: 2020-01-01

## 2020-01-01 RX ORDER — POLYETHYLENE GLYCOL 3350 17 G/17G
17 POWDER, FOR SOLUTION ORAL 2 TIMES DAILY
Status: DISCONTINUED | OUTPATIENT
Start: 2020-01-01 | End: 2020-01-01 | Stop reason: HOSPADM

## 2020-01-01 RX ORDER — COLCHICINE 0.6 MG/1
TABLET ORAL
Qty: 30 TABLET | Refills: 0 | OUTPATIENT
Start: 2020-01-01

## 2020-01-01 RX ORDER — BUMETANIDE 1 MG/1
2 TABLET ORAL DAILY
Qty: 180 TABLET | Refills: 3 | Status: SHIPPED | OUTPATIENT
Start: 2020-01-01 | End: 2020-01-01

## 2020-01-01 RX ORDER — AMIODARONE HYDROCHLORIDE 200 MG/1
TABLET ORAL
Qty: 90 TABLET | Refills: 2 | OUTPATIENT
Start: 2020-01-01

## 2020-01-01 RX ORDER — LORAZEPAM 2 MG/1
2 TABLET ORAL EVERY 6 HOURS
Status: DISCONTINUED | OUTPATIENT
Start: 2020-01-01 | End: 2020-01-01

## 2020-01-01 RX ORDER — ACETAMINOPHEN 325 MG/1
650 TABLET ORAL EVERY 6 HOURS PRN
Status: DISCONTINUED | OUTPATIENT
Start: 2020-01-01 | End: 2020-01-01 | Stop reason: HOSPADM

## 2020-01-01 RX ORDER — CEFTRIAXONE 1 G/1
1 INJECTION, POWDER, FOR SOLUTION INTRAMUSCULAR; INTRAVENOUS EVERY 24 HOURS
Status: DISCONTINUED | OUTPATIENT
Start: 2020-01-01 | End: 2020-01-01

## 2020-01-01 RX ORDER — ACETAMINOPHEN 650 MG/1
650 SUPPOSITORY RECTAL EVERY 4 HOURS PRN
Status: DISCONTINUED | OUTPATIENT
Start: 2020-01-01 | End: 2020-01-01

## 2020-01-01 RX ORDER — DEXMEDETOMIDINE HYDROCHLORIDE 4 UG/ML
.2-1.5 INJECTION, SOLUTION INTRAVENOUS CONTINUOUS
Status: DISCONTINUED | OUTPATIENT
Start: 2020-01-01 | End: 2020-01-01

## 2020-01-01 RX ORDER — DIPYRIDAMOLE 25 MG
25 TABLET ORAL 3 TIMES DAILY
Status: DISCONTINUED | OUTPATIENT
Start: 2020-01-01 | End: 2020-01-01 | Stop reason: HOSPADM

## 2020-01-01 RX ORDER — ETOMIDATE 2 MG/ML
INJECTION INTRAVENOUS PRN
Status: DISCONTINUED | OUTPATIENT
Start: 2020-01-01 | End: 2020-01-01

## 2020-01-01 RX ORDER — NICOTINE POLACRILEX 4 MG
15-30 LOZENGE BUCCAL
Status: DISCONTINUED | OUTPATIENT
Start: 2020-01-01 | End: 2020-01-01 | Stop reason: HOSPADM

## 2020-01-01 RX ORDER — MIDAZOLAM (PF) 1 MG/ML IN 0.9 % SODIUM CHLORIDE INTRAVENOUS SOLUTION
1-8 CONTINUOUS
Status: CANCELLED | OUTPATIENT
Start: 2020-01-01

## 2020-01-01 RX ORDER — WARFARIN SODIUM 2 MG/1
TABLET ORAL
Qty: 135 TABLET | Refills: 3 | Status: SHIPPED | OUTPATIENT
Start: 2020-01-01

## 2020-01-01 RX ORDER — LISINOPRIL 5 MG/1
5 TABLET ORAL DAILY
OUTPATIENT
Start: 2020-01-01

## 2020-01-01 RX ORDER — COLCHICINE 0.6 MG/1
TABLET ORAL
Qty: 15 TABLET | Refills: 1 | Status: SHIPPED | OUTPATIENT
Start: 2020-01-01

## 2020-01-01 RX ORDER — DOXYCYCLINE 100 MG/1
100 CAPSULE ORAL EVERY 12 HOURS SCHEDULED
Status: DISCONTINUED | OUTPATIENT
Start: 2020-01-01 | End: 2020-01-01

## 2020-01-01 RX ORDER — POLYETHYLENE GLYCOL 3350 17 G/17G
17 POWDER, FOR SOLUTION ORAL DAILY
Status: DISCONTINUED | OUTPATIENT
Start: 2020-01-01 | End: 2020-01-01

## 2020-01-01 RX ORDER — IOPAMIDOL 755 MG/ML
135 INJECTION, SOLUTION INTRAVASCULAR ONCE
Status: COMPLETED | OUTPATIENT
Start: 2020-01-01 | End: 2020-01-01

## 2020-01-01 RX ORDER — OXYCODONE HYDROCHLORIDE 10 MG/1
10 TABLET ORAL EVERY 8 HOURS
Status: DISCONTINUED | OUTPATIENT
Start: 2020-01-01 | End: 2020-01-01

## 2020-01-01 RX ORDER — BUMETANIDE 0.25 MG/ML
1 INJECTION INTRAMUSCULAR; INTRAVENOUS ONCE
Status: COMPLETED | OUTPATIENT
Start: 2020-01-01 | End: 2020-01-01

## 2020-01-01 RX ORDER — ALBUMIN (HUMAN) 12.5 G/50ML
12.5 SOLUTION INTRAVENOUS ONCE
Status: COMPLETED | OUTPATIENT
Start: 2020-01-01 | End: 2020-01-01

## 2020-01-01 RX ORDER — ALLOPURINOL 100 MG/1
200 TABLET ORAL DAILY
Status: DISCONTINUED | OUTPATIENT
Start: 2020-01-01 | End: 2020-01-01 | Stop reason: HOSPADM

## 2020-01-01 RX ORDER — LOSARTAN POTASSIUM 25 MG/1
25 TABLET ORAL DAILY
Qty: 90 TABLET | Refills: 3 | Status: SHIPPED | OUTPATIENT
Start: 2020-01-01

## 2020-01-01 RX ORDER — ASPIRIN 81 MG/1
81 TABLET, CHEWABLE ORAL DAILY
Status: DISCONTINUED | OUTPATIENT
Start: 2020-01-01 | End: 2020-01-01

## 2020-01-01 RX ORDER — LORAZEPAM 2 MG/ML
0.5 INJECTION INTRAMUSCULAR ONCE
Status: COMPLETED | OUTPATIENT
Start: 2020-01-01 | End: 2020-01-01

## 2020-01-01 RX ORDER — FENTANYL CITRATE 50 UG/ML
50 INJECTION, SOLUTION INTRAMUSCULAR; INTRAVENOUS EVERY 5 MIN PRN
Status: DISCONTINUED | OUTPATIENT
Start: 2020-01-01 | End: 2020-01-01 | Stop reason: HOSPADM

## 2020-01-01 RX ORDER — SODIUM CHLORIDE, SODIUM LACTATE, POTASSIUM CHLORIDE, CALCIUM CHLORIDE 600; 310; 30; 20 MG/100ML; MG/100ML; MG/100ML; MG/100ML
INJECTION, SOLUTION INTRAVENOUS CONTINUOUS
Status: DISCONTINUED | OUTPATIENT
Start: 2020-01-01 | End: 2020-01-01

## 2020-01-01 RX ORDER — METOPROLOL SUCCINATE 25 MG/1
37.5 TABLET, EXTENDED RELEASE ORAL DAILY
Qty: 90 TABLET | Refills: 3 | Status: SHIPPED | OUTPATIENT
Start: 2020-01-01

## 2020-01-01 RX ORDER — OXYCODONE HYDROCHLORIDE 5 MG/1
5 TABLET ORAL EVERY 6 HOURS
Status: DISCONTINUED | OUTPATIENT
Start: 2020-01-01 | End: 2020-01-01

## 2020-01-01 RX ORDER — FUROSEMIDE 10 MG/ML
80 INJECTION INTRAMUSCULAR; INTRAVENOUS ONCE
Status: COMPLETED | OUTPATIENT
Start: 2020-01-01 | End: 2020-01-01

## 2020-01-01 RX ORDER — METOLAZONE 5 MG/1
5 TABLET ORAL ONCE
Status: COMPLETED | OUTPATIENT
Start: 2020-01-01 | End: 2020-01-01

## 2020-01-01 RX ADMIN — LORAZEPAM 4 MG: 2 TABLET ORAL at 09:43

## 2020-01-01 RX ADMIN — OXYCODONE HYDROCHLORIDE 10 MG: 10 TABLET ORAL at 20:38

## 2020-01-01 RX ADMIN — INSULIN ASPART 1 UNITS: 100 INJECTION, SOLUTION INTRAVENOUS; SUBCUTANEOUS at 16:03

## 2020-01-01 RX ADMIN — Medication 40 MG: at 08:17

## 2020-01-01 RX ADMIN — BIVALIRUDIN 0.22 MG/KG/HR: 250 INJECTION, POWDER, LYOPHILIZED, FOR SOLUTION INTRAVENOUS at 08:30

## 2020-01-01 RX ADMIN — VANCOMYCIN HYDROCHLORIDE 2500 MG: 1 INJECTION, POWDER, LYOPHILIZED, FOR SOLUTION INTRAVENOUS at 13:15

## 2020-01-01 RX ADMIN — DOXYCYCLINE 100 MG: 100 CAPSULE ORAL at 07:40

## 2020-01-01 RX ADMIN — LORAZEPAM 1 MG: 1 TABLET ORAL at 10:14

## 2020-01-01 RX ADMIN — OXYCODONE HYDROCHLORIDE 10 MG: 10 TABLET ORAL at 19:54

## 2020-01-01 RX ADMIN — OXYCODONE HYDROCHLORIDE 10 MG: 10 TABLET ORAL at 19:38

## 2020-01-01 RX ADMIN — Medication 40 MG: at 08:29

## 2020-01-01 RX ADMIN — OXYCODONE HYDROCHLORIDE 10 MG: 10 TABLET ORAL at 15:59

## 2020-01-01 RX ADMIN — INSULIN ASPART 3 UNITS: 100 INJECTION, SOLUTION INTRAVENOUS; SUBCUTANEOUS at 15:43

## 2020-01-01 RX ADMIN — OXYCODONE HYDROCHLORIDE 10 MG: 10 TABLET ORAL at 23:59

## 2020-01-01 RX ADMIN — OXYCODONE HYDROCHLORIDE 10 MG: 10 TABLET ORAL at 04:11

## 2020-01-01 RX ADMIN — INSULIN ASPART 1 UNITS: 100 INJECTION, SOLUTION INTRAVENOUS; SUBCUTANEOUS at 23:51

## 2020-01-01 RX ADMIN — DEXMEDETOMIDINE 1.2 MCG/KG/HR: 100 INJECTION, SOLUTION, CONCENTRATE INTRAVENOUS at 21:00

## 2020-01-01 RX ADMIN — DEXMEDETOMIDINE 0.7 MCG/KG/HR: 100 INJECTION, SOLUTION, CONCENTRATE INTRAVENOUS at 14:24

## 2020-01-01 RX ADMIN — Medication 100 MCG/HR: at 13:57

## 2020-01-01 RX ADMIN — Medication 0.12 MCG/KG/MIN: at 14:57

## 2020-01-01 RX ADMIN — DIPYRIDAMOLE 25 MG: 25 TABLET, FILM COATED ORAL at 09:43

## 2020-01-01 RX ADMIN — ALLOPURINOL 200 MG: 100 TABLET ORAL at 08:21

## 2020-01-01 RX ADMIN — Medication 1 PACKET: at 19:42

## 2020-01-01 RX ADMIN — Medication 40 MG: at 07:33

## 2020-01-01 RX ADMIN — FUROSEMIDE 40 MG: 10 INJECTION, SOLUTION INTRAVENOUS at 13:53

## 2020-01-01 RX ADMIN — PIPERACILLIN SODIUM AND TAZOBACTAM SODIUM 4.5 G: 4; .5 INJECTION, POWDER, LYOPHILIZED, FOR SOLUTION INTRAVENOUS at 11:41

## 2020-01-01 RX ADMIN — OXYCODONE HYDROCHLORIDE 10 MG: 10 TABLET ORAL at 08:09

## 2020-01-01 RX ADMIN — LORAZEPAM 4 MG: 2 TABLET ORAL at 11:41

## 2020-01-01 RX ADMIN — OXYCODONE HYDROCHLORIDE 10 MG: 10 TABLET ORAL at 04:05

## 2020-01-01 RX ADMIN — DIPYRIDAMOLE 25 MG: 25 TABLET, FILM COATED ORAL at 14:14

## 2020-01-01 RX ADMIN — ACETAMINOPHEN 975 MG: 325 TABLET, FILM COATED ORAL at 04:17

## 2020-01-01 RX ADMIN — LORAZEPAM 4 MG: 2 TABLET ORAL at 14:16

## 2020-01-01 RX ADMIN — Medication 50 MCG: at 08:00

## 2020-01-01 RX ADMIN — Medication 20 NG/KG/MIN: at 15:59

## 2020-01-01 RX ADMIN — DEXMEDETOMIDINE 0.2 MCG/KG/HR: 100 INJECTION, SOLUTION, CONCENTRATE INTRAVENOUS at 11:00

## 2020-01-01 RX ADMIN — PIPERACILLIN SODIUM AND TAZOBACTAM SODIUM 4.5 G: 4; .5 INJECTION, POWDER, LYOPHILIZED, FOR SOLUTION INTRAVENOUS at 06:16

## 2020-01-01 RX ADMIN — THIAMINE HCL TAB 100 MG 100 MG: 100 TAB at 08:23

## 2020-01-01 RX ADMIN — INSULIN ASPART 1 UNITS: 100 INJECTION, SOLUTION INTRAVENOUS; SUBCUTANEOUS at 23:58

## 2020-01-01 RX ADMIN — ASPIRIN 81 MG CHEWABLE TABLET 162 MG: 81 TABLET CHEWABLE at 08:06

## 2020-01-01 RX ADMIN — LORAZEPAM 1 MG: 1 TABLET ORAL at 22:20

## 2020-01-01 RX ADMIN — OXYCODONE HYDROCHLORIDE 10 MG: 10 TABLET ORAL at 15:55

## 2020-01-01 RX ADMIN — DOXYCYCLINE 100 MG: 100 CAPSULE ORAL at 20:09

## 2020-01-01 RX ADMIN — Medication 20 NG/KG/MIN: at 04:53

## 2020-01-01 RX ADMIN — PIPERACILLIN SODIUM AND TAZOBACTAM SODIUM 4.5 G: 4; .5 INJECTION, POWDER, LYOPHILIZED, FOR SOLUTION INTRAVENOUS at 02:16

## 2020-01-01 RX ADMIN — MAGNESIUM SULFATE IN WATER 2 G: 40 INJECTION, SOLUTION INTRAVENOUS at 16:52

## 2020-01-01 RX ADMIN — DEXMEDETOMIDINE 1.2 MCG/KG/HR: 100 INJECTION, SOLUTION, CONCENTRATE INTRAVENOUS at 13:47

## 2020-01-01 RX ADMIN — DEXMEDETOMIDINE 0.9 MCG/KG/HR: 100 INJECTION, SOLUTION, CONCENTRATE INTRAVENOUS at 01:05

## 2020-01-01 RX ADMIN — DOBUTAMINE 5 MCG/KG/MIN: 12.5 INJECTION, SOLUTION INTRAVENOUS at 14:50

## 2020-01-01 RX ADMIN — ALLOPURINOL 200 MG: 100 TABLET ORAL at 08:09

## 2020-01-01 RX ADMIN — ASPIRIN 81 MG CHEWABLE TABLET 162 MG: 81 TABLET CHEWABLE at 19:38

## 2020-01-01 RX ADMIN — Medication 20 NG/KG/MIN: at 10:18

## 2020-01-01 RX ADMIN — DIPYRIDAMOLE 25 MG: 25 TABLET, FILM COATED ORAL at 19:42

## 2020-01-01 RX ADMIN — Medication 1 PACKET: at 20:47

## 2020-01-01 RX ADMIN — Medication 1 PACKET: at 07:32

## 2020-01-01 RX ADMIN — BIVALIRUDIN 0.27 MG/KG/HR: 250 INJECTION, POWDER, LYOPHILIZED, FOR SOLUTION INTRAVENOUS at 16:03

## 2020-01-01 RX ADMIN — DIPYRIDAMOLE 25 MG: 25 TABLET, FILM COATED ORAL at 22:33

## 2020-01-01 RX ADMIN — BIVALIRUDIN 0.27 MG/KG/HR: 250 INJECTION, POWDER, LYOPHILIZED, FOR SOLUTION INTRAVENOUS at 10:15

## 2020-01-01 RX ADMIN — ASPIRIN 81 MG CHEWABLE TABLET 162 MG: 81 TABLET CHEWABLE at 20:22

## 2020-01-01 RX ADMIN — Medication 37.5 MG: at 08:47

## 2020-01-01 RX ADMIN — PIPERACILLIN SODIUM AND TAZOBACTAM SODIUM 4.5 G: 4; .5 INJECTION, POWDER, LYOPHILIZED, FOR SOLUTION INTRAVENOUS at 02:09

## 2020-01-01 RX ADMIN — Medication 20 NG/KG/MIN: at 23:38

## 2020-01-01 RX ADMIN — OXYCODONE HYDROCHLORIDE 10 MG: 10 TABLET ORAL at 03:59

## 2020-01-01 RX ADMIN — ETOMIDATE 20 MG: 2 INJECTION INTRAVENOUS at 18:17

## 2020-01-01 RX ADMIN — INSULIN ASPART 2 UNITS: 100 INJECTION, SOLUTION INTRAVENOUS; SUBCUTANEOUS at 04:05

## 2020-01-01 RX ADMIN — POTASSIUM CHLORIDE 20 MEQ: 29.8 INJECTION, SOLUTION INTRAVENOUS at 05:39

## 2020-01-01 RX ADMIN — ASPIRIN 81 MG CHEWABLE TABLET 81 MG: 81 TABLET CHEWABLE at 07:40

## 2020-01-01 RX ADMIN — DEXMEDETOMIDINE 0.7 MCG/KG/HR: 100 INJECTION, SOLUTION, CONCENTRATE INTRAVENOUS at 21:30

## 2020-01-01 RX ADMIN — OXYCODONE HYDROCHLORIDE 10 MG: 10 TABLET ORAL at 07:59

## 2020-01-01 RX ADMIN — DIPYRIDAMOLE 25 MG: 25 TABLET, FILM COATED ORAL at 13:53

## 2020-01-01 RX ADMIN — DOXYCYCLINE 100 MG: 100 CAPSULE ORAL at 20:04

## 2020-01-01 RX ADMIN — BIVALIRUDIN 0.27 MG/KG/HR: 250 INJECTION, POWDER, LYOPHILIZED, FOR SOLUTION INTRAVENOUS at 23:18

## 2020-01-01 RX ADMIN — THIAMINE HCL TAB 100 MG 100 MG: 100 TAB at 07:38

## 2020-01-01 RX ADMIN — Medication 20 NG/KG/MIN: at 12:12

## 2020-01-01 RX ADMIN — AMIODARONE HYDROCHLORIDE 150 MG: 1.5 INJECTION, SOLUTION INTRAVENOUS at 03:19

## 2020-01-01 RX ADMIN — DIPYRIDAMOLE 25 MG: 25 TABLET, FILM COATED ORAL at 08:25

## 2020-01-01 RX ADMIN — LIDOCAINE 1 PATCH: 560 PATCH PERCUTANEOUS; TOPICAL; TRANSDERMAL at 19:54

## 2020-01-01 RX ADMIN — DIPYRIDAMOLE 25 MG: 25 TABLET, FILM COATED ORAL at 13:42

## 2020-01-01 RX ADMIN — PIPERACILLIN SODIUM AND TAZOBACTAM SODIUM 4.5 G: 4; .5 INJECTION, POWDER, LYOPHILIZED, FOR SOLUTION INTRAVENOUS at 11:31

## 2020-01-01 RX ADMIN — Medication 40 MG: at 08:23

## 2020-01-01 RX ADMIN — ASPIRIN 81 MG CHEWABLE TABLET 162 MG: 81 TABLET CHEWABLE at 20:14

## 2020-01-01 RX ADMIN — POLYETHYLENE GLYCOL 3350 17 G: 17 POWDER, FOR SOLUTION ORAL at 08:35

## 2020-01-01 RX ADMIN — LORAZEPAM 4 MG: 2 TABLET ORAL at 22:07

## 2020-01-01 RX ADMIN — BIVALIRUDIN 0.22 MG/KG/HR: 250 INJECTION, POWDER, LYOPHILIZED, FOR SOLUTION INTRAVENOUS at 11:51

## 2020-01-01 RX ADMIN — INSULIN ASPART 2 UNITS: 100 INJECTION, SOLUTION INTRAVENOUS; SUBCUTANEOUS at 20:48

## 2020-01-01 RX ADMIN — INSULIN ASPART 3 UNITS: 100 INJECTION, SOLUTION INTRAVENOUS; SUBCUTANEOUS at 08:22

## 2020-01-01 RX ADMIN — OXYCODONE HYDROCHLORIDE 5 MG: 5 TABLET ORAL at 02:16

## 2020-01-01 RX ADMIN — DIPYRIDAMOLE 25 MG: 25 TABLET, FILM COATED ORAL at 14:21

## 2020-01-01 RX ADMIN — LORAZEPAM 4 MG: 2 TABLET ORAL at 22:22

## 2020-01-01 RX ADMIN — POTASSIUM PHOSPHATE, MONOBASIC AND POTASSIUM PHOSPHATE, DIBASIC 15 MMOL: 224; 236 INJECTION, SOLUTION INTRAVENOUS at 08:15

## 2020-01-01 RX ADMIN — Medication 50 MCG: at 10:01

## 2020-01-01 RX ADMIN — SODIUM CHLORIDE 250 ML: 9 INJECTION, SOLUTION INTRAVENOUS at 14:00

## 2020-01-01 RX ADMIN — Medication 0.5 UNITS/HR: at 23:43

## 2020-01-01 RX ADMIN — PIPERACILLIN SODIUM AND TAZOBACTAM SODIUM 4.5 G: 4; .5 INJECTION, POWDER, LYOPHILIZED, FOR SOLUTION INTRAVENOUS at 14:50

## 2020-01-01 RX ADMIN — PIPERACILLIN SODIUM AND TAZOBACTAM SODIUM 4.5 G: 4; .5 INJECTION, POWDER, LYOPHILIZED, FOR SOLUTION INTRAVENOUS at 00:23

## 2020-01-01 RX ADMIN — OXYCODONE HYDROCHLORIDE 10 MG: 10 TABLET ORAL at 21:02

## 2020-01-01 RX ADMIN — Medication 1 PACKET: at 19:39

## 2020-01-01 RX ADMIN — LORAZEPAM 4 MG: 2 TABLET ORAL at 15:08

## 2020-01-01 RX ADMIN — Medication 1 PACKET: at 07:41

## 2020-01-01 RX ADMIN — BIVALIRUDIN 0.22 MG/KG/HR: 250 INJECTION, POWDER, LYOPHILIZED, FOR SOLUTION INTRAVENOUS at 11:13

## 2020-01-01 RX ADMIN — Medication 50 MCG: at 06:59

## 2020-01-01 RX ADMIN — Medication 20 NG/KG/MIN: at 18:22

## 2020-01-01 RX ADMIN — INSULIN ASPART 2 UNITS: 100 INJECTION, SOLUTION INTRAVENOUS; SUBCUTANEOUS at 12:04

## 2020-01-01 RX ADMIN — AMIODARONE HYDROCHLORIDE 150 MG: 1.5 INJECTION, SOLUTION INTRAVENOUS at 15:34

## 2020-01-01 RX ADMIN — THIAMINE HCL TAB 100 MG 100 MG: 100 TAB at 09:04

## 2020-01-01 RX ADMIN — SODIUM CHLORIDE, POTASSIUM CHLORIDE, SODIUM LACTATE AND CALCIUM CHLORIDE 500 ML: 600; 310; 30; 20 INJECTION, SOLUTION INTRAVENOUS at 19:49

## 2020-01-01 RX ADMIN — DIPYRIDAMOLE 25 MG: 25 TABLET, FILM COATED ORAL at 09:05

## 2020-01-01 RX ADMIN — DOXYCYCLINE 100 MG: 100 CAPSULE ORAL at 07:33

## 2020-01-01 RX ADMIN — OXYCODONE HYDROCHLORIDE 5 MG: 5 TABLET ORAL at 14:51

## 2020-01-01 RX ADMIN — BIVALIRUDIN 0.11 MG/KG/HR: 250 INJECTION, POWDER, LYOPHILIZED, FOR SOLUTION INTRAVENOUS at 22:57

## 2020-01-01 RX ADMIN — FUROSEMIDE 80 MG: 10 INJECTION, SOLUTION INTRAVENOUS at 09:56

## 2020-01-01 RX ADMIN — LORAZEPAM 2 MG: 2 INJECTION INTRAMUSCULAR; INTRAVENOUS at 04:13

## 2020-01-01 RX ADMIN — ASPIRIN 81 MG CHEWABLE TABLET 162 MG: 81 TABLET CHEWABLE at 20:01

## 2020-01-01 RX ADMIN — ASPIRIN 81 MG CHEWABLE TABLET 162 MG: 81 TABLET CHEWABLE at 08:22

## 2020-01-01 RX ADMIN — THIAMINE HCL TAB 100 MG 100 MG: 100 TAB at 08:22

## 2020-01-01 RX ADMIN — Medication 10 NG/KG/MIN: at 20:24

## 2020-01-01 RX ADMIN — DEXAMETHASONE 6 MG: 2 TABLET ORAL at 12:27

## 2020-01-01 RX ADMIN — OXYCODONE HYDROCHLORIDE 10 MG: 10 TABLET ORAL at 08:06

## 2020-01-01 RX ADMIN — ALLOPURINOL 200 MG: 100 TABLET ORAL at 08:31

## 2020-01-01 RX ADMIN — PIPERACILLIN SODIUM AND TAZOBACTAM SODIUM 4.5 G: 4; .5 INJECTION, POWDER, LYOPHILIZED, FOR SOLUTION INTRAVENOUS at 23:42

## 2020-01-01 RX ADMIN — Medication 40 MG: at 08:34

## 2020-01-01 RX ADMIN — PIPERACILLIN SODIUM AND TAZOBACTAM SODIUM 4.5 G: 4; .5 INJECTION, POWDER, LYOPHILIZED, FOR SOLUTION INTRAVENOUS at 12:15

## 2020-01-01 RX ADMIN — ASPIRIN 81 MG CHEWABLE TABLET 162 MG: 81 TABLET CHEWABLE at 19:39

## 2020-01-01 RX ADMIN — Medication 50 MCG: at 22:00

## 2020-01-01 RX ADMIN — OXYCODONE HYDROCHLORIDE 10 MG: 10 TABLET ORAL at 11:41

## 2020-01-01 RX ADMIN — OXYCODONE HYDROCHLORIDE 5 MG: 5 TABLET ORAL at 16:17

## 2020-01-01 RX ADMIN — OXYCODONE HYDROCHLORIDE 10 MG: 10 TABLET ORAL at 00:25

## 2020-01-01 RX ADMIN — LORAZEPAM 4 MG: 2 TABLET ORAL at 15:59

## 2020-01-01 RX ADMIN — OXYCODONE HYDROCHLORIDE 10 MG: 10 TABLET ORAL at 23:44

## 2020-01-01 RX ADMIN — ASPIRIN 81 MG CHEWABLE TABLET 81 MG: 81 TABLET CHEWABLE at 07:32

## 2020-01-01 RX ADMIN — ASPIRIN 81 MG CHEWABLE TABLET 81 MG: 81 TABLET CHEWABLE at 10:57

## 2020-01-01 RX ADMIN — OXYCODONE HYDROCHLORIDE 10 MG: 10 TABLET ORAL at 08:34

## 2020-01-01 RX ADMIN — ASPIRIN 81 MG CHEWABLE TABLET 162 MG: 81 TABLET CHEWABLE at 08:21

## 2020-01-01 RX ADMIN — Medication 40 MG: at 08:24

## 2020-01-01 RX ADMIN — INSULIN ASPART 1 UNITS: 100 INJECTION, SOLUTION INTRAVENOUS; SUBCUTANEOUS at 04:40

## 2020-01-01 RX ADMIN — BUMETANIDE 2 MG: 0.25 INJECTION, SOLUTION INTRAMUSCULAR; INTRAVENOUS at 23:54

## 2020-01-01 RX ADMIN — AMIODARONE HYDROCHLORIDE 200 MG: 200 TABLET ORAL at 10:58

## 2020-01-01 RX ADMIN — INSULIN ASPART 2 UNITS: 100 INJECTION, SOLUTION INTRAVENOUS; SUBCUTANEOUS at 12:38

## 2020-01-01 RX ADMIN — LORAZEPAM 4 MG: 2 TABLET ORAL at 22:01

## 2020-01-01 RX ADMIN — AMIODARONE HYDROCHLORIDE 200 MG: 200 TABLET ORAL at 08:07

## 2020-01-01 RX ADMIN — OXYCODONE HYDROCHLORIDE 5 MG: 5 TABLET ORAL at 20:03

## 2020-01-01 RX ADMIN — NOREPINEPHRINE BITARTRATE 0.03 MCG/KG/MIN: 1 INJECTION, SOLUTION, CONCENTRATE INTRAVENOUS at 18:38

## 2020-01-01 RX ADMIN — Medication 1 PACKET: at 20:15

## 2020-01-01 RX ADMIN — INSULIN ASPART 1 UNITS: 100 INJECTION, SOLUTION INTRAVENOUS; SUBCUTANEOUS at 04:12

## 2020-01-01 RX ADMIN — INSULIN ASPART 2 UNITS: 100 INJECTION, SOLUTION INTRAVENOUS; SUBCUTANEOUS at 19:47

## 2020-01-01 RX ADMIN — INSULIN ASPART 6 UNITS: 100 INJECTION, SOLUTION INTRAVENOUS; SUBCUTANEOUS at 16:28

## 2020-01-01 RX ADMIN — OXYCODONE HYDROCHLORIDE 5 MG: 5 TABLET ORAL at 14:50

## 2020-01-01 RX ADMIN — Medication 20 NG/KG/MIN: at 06:15

## 2020-01-01 RX ADMIN — LORAZEPAM 2 MG: 2 TABLET ORAL at 03:36

## 2020-01-01 RX ADMIN — INSULIN ASPART 4 UNITS: 100 INJECTION, SOLUTION INTRAVENOUS; SUBCUTANEOUS at 20:25

## 2020-01-01 RX ADMIN — INSULIN ASPART 3 UNITS: 100 INJECTION, SOLUTION INTRAVENOUS; SUBCUTANEOUS at 08:47

## 2020-01-01 RX ADMIN — DEXAMETHASONE 6 MG: 2 TABLET ORAL at 14:16

## 2020-01-01 RX ADMIN — ALBUMIN HUMAN 25 G: 0.05 INJECTION, SOLUTION INTRAVENOUS at 13:56

## 2020-01-01 RX ADMIN — Medication 1 PACKET: at 08:59

## 2020-01-01 RX ADMIN — Medication 1 PACKET: at 20:05

## 2020-01-01 RX ADMIN — SODIUM CHLORIDE, POTASSIUM CHLORIDE, SODIUM LACTATE AND CALCIUM CHLORIDE: 600; 310; 30; 20 INJECTION, SOLUTION INTRAVENOUS at 06:16

## 2020-01-01 RX ADMIN — INSULIN ASPART 4 UNITS: 100 INJECTION, SOLUTION INTRAVENOUS; SUBCUTANEOUS at 19:43

## 2020-01-01 RX ADMIN — THIAMINE HCL TAB 100 MG 100 MG: 100 TAB at 08:34

## 2020-01-01 RX ADMIN — LORAZEPAM 1 MG: 1 TABLET ORAL at 10:40

## 2020-01-01 RX ADMIN — INSULIN ASPART 1 UNITS: 100 INJECTION, SOLUTION INTRAVENOUS; SUBCUTANEOUS at 11:31

## 2020-01-01 RX ADMIN — Medication 4 UNITS/HR: at 10:15

## 2020-01-01 RX ADMIN — PIPERACILLIN SODIUM AND TAZOBACTAM SODIUM 4.5 G: 4; .5 INJECTION, POWDER, LYOPHILIZED, FOR SOLUTION INTRAVENOUS at 20:25

## 2020-01-01 RX ADMIN — DIPYRIDAMOLE 25 MG: 25 TABLET, FILM COATED ORAL at 14:51

## 2020-01-01 RX ADMIN — THIAMINE HCL TAB 100 MG 100 MG: 100 TAB at 08:05

## 2020-01-01 RX ADMIN — ADENOSINE 6 MG: 3 INJECTION, SOLUTION INTRAVENOUS at 13:35

## 2020-01-01 RX ADMIN — DEXMEDETOMIDINE 1.2 MCG/KG/HR: 100 INJECTION, SOLUTION, CONCENTRATE INTRAVENOUS at 10:15

## 2020-01-01 RX ADMIN — OXYCODONE HYDROCHLORIDE 10 MG: 10 TABLET ORAL at 12:33

## 2020-01-01 RX ADMIN — OXYCODONE HYDROCHLORIDE 10 MG: 10 TABLET ORAL at 12:25

## 2020-01-01 RX ADMIN — Medication 20 NG/KG/MIN: at 01:37

## 2020-01-01 RX ADMIN — DOXYCYCLINE 100 MG: 100 CAPSULE ORAL at 13:05

## 2020-01-01 RX ADMIN — Medication 20 NG/KG/MIN: at 04:08

## 2020-01-01 RX ADMIN — BIVALIRUDIN 0.27 MG/KG/HR: 250 INJECTION, POWDER, LYOPHILIZED, FOR SOLUTION INTRAVENOUS at 02:28

## 2020-01-01 RX ADMIN — Medication 20 NG/KG/MIN: at 14:27

## 2020-01-01 RX ADMIN — FENTANYL CITRATE 25 MCG: 50 INJECTION, SOLUTION INTRAMUSCULAR; INTRAVENOUS at 08:47

## 2020-01-01 RX ADMIN — Medication 200 MCG: at 07:20

## 2020-01-01 RX ADMIN — Medication 1 PACKET: at 08:30

## 2020-01-01 RX ADMIN — LIDOCAINE: 40 CREAM TOPICAL at 13:12

## 2020-01-01 RX ADMIN — BIVALIRUDIN 0.27 MG/KG/HR: 250 INJECTION, POWDER, LYOPHILIZED, FOR SOLUTION INTRAVENOUS at 04:20

## 2020-01-01 RX ADMIN — DEXMEDETOMIDINE 0.5 MCG/KG/HR: 100 INJECTION, SOLUTION, CONCENTRATE INTRAVENOUS at 18:53

## 2020-01-01 RX ADMIN — BIVALIRUDIN 0.27 MG/KG/HR: 250 INJECTION, POWDER, LYOPHILIZED, FOR SOLUTION INTRAVENOUS at 20:04

## 2020-01-01 RX ADMIN — LORAZEPAM 4 MG: 2 TABLET ORAL at 03:08

## 2020-01-01 RX ADMIN — PIPERACILLIN SODIUM AND TAZOBACTAM SODIUM 4.5 G: 4; .5 INJECTION, POWDER, LYOPHILIZED, FOR SOLUTION INTRAVENOUS at 18:11

## 2020-01-01 RX ADMIN — FENTANYL CITRATE 50 MCG: 50 INJECTION, SOLUTION INTRAMUSCULAR; INTRAVENOUS at 12:54

## 2020-01-01 RX ADMIN — INSULIN ASPART 2 UNITS: 100 INJECTION, SOLUTION INTRAVENOUS; SUBCUTANEOUS at 01:12

## 2020-01-01 RX ADMIN — INSULIN ASPART 3 UNITS: 100 INJECTION, SOLUTION INTRAVENOUS; SUBCUTANEOUS at 20:09

## 2020-01-01 RX ADMIN — REMDESIVIR 100 MG: 100 INJECTION, POWDER, LYOPHILIZED, FOR SOLUTION INTRAVENOUS at 13:48

## 2020-01-01 RX ADMIN — Medication 37.5 MG: at 08:07

## 2020-01-01 RX ADMIN — DIPYRIDAMOLE 25 MG: 25 TABLET, FILM COATED ORAL at 08:17

## 2020-01-01 RX ADMIN — Medication 20 NG/KG/MIN: at 08:09

## 2020-01-01 RX ADMIN — Medication 100 MCG: at 21:51

## 2020-01-01 RX ADMIN — PIPERACILLIN SODIUM AND TAZOBACTAM SODIUM 4.5 G: 4; .5 INJECTION, POWDER, LYOPHILIZED, FOR SOLUTION INTRAVENOUS at 23:33

## 2020-01-01 RX ADMIN — DEXAMETHASONE 6 MG: 2 TABLET ORAL at 13:02

## 2020-01-01 RX ADMIN — INSULIN ASPART 5 UNITS: 100 INJECTION, SOLUTION INTRAVENOUS; SUBCUTANEOUS at 20:27

## 2020-01-01 RX ADMIN — INSULIN ASPART 1 UNITS: 100 INJECTION, SOLUTION INTRAVENOUS; SUBCUTANEOUS at 00:31

## 2020-01-01 RX ADMIN — LORAZEPAM 1 MG: 1 TABLET ORAL at 03:52

## 2020-01-01 RX ADMIN — INSULIN ASPART 1 UNITS: 100 INJECTION, SOLUTION INTRAVENOUS; SUBCUTANEOUS at 03:57

## 2020-01-01 RX ADMIN — PIPERACILLIN SODIUM AND TAZOBACTAM SODIUM 4.5 G: 4; .5 INJECTION, POWDER, LYOPHILIZED, FOR SOLUTION INTRAVENOUS at 18:53

## 2020-01-01 RX ADMIN — OXYCODONE HYDROCHLORIDE 10 MG: 10 TABLET ORAL at 19:39

## 2020-01-01 RX ADMIN — INSULIN ASPART 4 UNITS: 100 INJECTION, SOLUTION INTRAVENOUS; SUBCUTANEOUS at 16:35

## 2020-01-01 RX ADMIN — IOPAMIDOL 135 ML: 755 INJECTION, SOLUTION INTRAVENOUS at 20:32

## 2020-01-01 RX ADMIN — INSULIN ASPART 6 UNITS: 100 INJECTION, SOLUTION INTRAVENOUS; SUBCUTANEOUS at 08:34

## 2020-01-01 RX ADMIN — SODIUM CHLORIDE, POTASSIUM CHLORIDE, SODIUM LACTATE AND CALCIUM CHLORIDE: 600; 310; 30; 20 INJECTION, SOLUTION INTRAVENOUS at 00:12

## 2020-01-01 RX ADMIN — DIPYRIDAMOLE 25 MG: 25 TABLET, FILM COATED ORAL at 15:08

## 2020-01-01 RX ADMIN — INSULIN ASPART 5 UNITS: 100 INJECTION, SOLUTION INTRAVENOUS; SUBCUTANEOUS at 00:04

## 2020-01-01 RX ADMIN — SODIUM PHOSPHATE, MONOBASIC, MONOHYDRATE 15 MMOL: 276; 142 INJECTION, SOLUTION INTRAVENOUS at 10:17

## 2020-01-01 RX ADMIN — PIPERACILLIN SODIUM AND TAZOBACTAM SODIUM 4.5 G: 4; .5 INJECTION, POWDER, LYOPHILIZED, FOR SOLUTION INTRAVENOUS at 08:23

## 2020-01-01 RX ADMIN — ASPIRIN 81 MG CHEWABLE TABLET 162 MG: 81 TABLET CHEWABLE at 07:49

## 2020-01-01 RX ADMIN — ASPIRIN 81 MG CHEWABLE TABLET 81 MG: 81 TABLET CHEWABLE at 07:38

## 2020-01-01 RX ADMIN — Medication 1 PACKET: at 08:10

## 2020-01-01 RX ADMIN — POTASSIUM CHLORIDE 20 MEQ: 1.5 POWDER, FOR SOLUTION ORAL at 16:58

## 2020-01-01 RX ADMIN — POTASSIUM & SODIUM PHOSPHATES POWDER PACK 280-160-250 MG 1 PACKET: 280-160-250 PACK at 16:56

## 2020-01-01 RX ADMIN — Medication 100 MCG/HR: at 18:26

## 2020-01-01 RX ADMIN — AMIODARONE HYDROCHLORIDE 200 MG: 200 TABLET ORAL at 08:43

## 2020-01-01 RX ADMIN — PIPERACILLIN SODIUM AND TAZOBACTAM SODIUM 4.5 G: 4; .5 INJECTION, POWDER, LYOPHILIZED, FOR SOLUTION INTRAVENOUS at 07:58

## 2020-01-01 RX ADMIN — Medication 1 PACKET: at 08:06

## 2020-01-01 RX ADMIN — SODIUM CHLORIDE, POTASSIUM CHLORIDE, SODIUM LACTATE AND CALCIUM CHLORIDE 250 ML: 600; 310; 30; 20 INJECTION, SOLUTION INTRAVENOUS at 22:01

## 2020-01-01 RX ADMIN — Medication 50 MCG: at 15:14

## 2020-01-01 RX ADMIN — INSULIN ASPART 7 UNITS: 100 INJECTION, SOLUTION INTRAVENOUS; SUBCUTANEOUS at 16:17

## 2020-01-01 RX ADMIN — MAGNESIUM SULFATE IN WATER 2 G: 40 INJECTION, SOLUTION INTRAVENOUS at 08:31

## 2020-01-01 RX ADMIN — INSULIN ASPART 1 UNITS: 100 INJECTION, SOLUTION INTRAVENOUS; SUBCUTANEOUS at 04:32

## 2020-01-01 RX ADMIN — DEXMEDETOMIDINE 0.2 MCG/KG/HR: 100 INJECTION, SOLUTION, CONCENTRATE INTRAVENOUS at 16:20

## 2020-01-01 RX ADMIN — SODIUM CHLORIDE 500 ML: 9 INJECTION, SOLUTION INTRAVENOUS at 20:23

## 2020-01-01 RX ADMIN — PIPERACILLIN SODIUM AND TAZOBACTAM SODIUM 4.5 G: 4; .5 INJECTION, POWDER, LYOPHILIZED, FOR SOLUTION INTRAVENOUS at 01:13

## 2020-01-01 RX ADMIN — LORAZEPAM 1 MG: 1 TABLET ORAL at 09:04

## 2020-01-01 RX ADMIN — VANCOMYCIN HYDROCHLORIDE 2250 MG: 10 INJECTION, POWDER, LYOPHILIZED, FOR SOLUTION INTRAVENOUS at 20:37

## 2020-01-01 RX ADMIN — Medication 50 MCG: at 21:30

## 2020-01-01 RX ADMIN — POTASSIUM & SODIUM PHOSPHATES POWDER PACK 280-160-250 MG 1 PACKET: 280-160-250 PACK at 20:52

## 2020-01-01 RX ADMIN — DIPYRIDAMOLE 25 MG: 25 TABLET, FILM COATED ORAL at 20:41

## 2020-01-01 RX ADMIN — ASPIRIN 325 MG ORAL TABLET 325 MG: 325 PILL ORAL at 06:06

## 2020-01-01 RX ADMIN — OXYCODONE HYDROCHLORIDE 5 MG: 5 TABLET ORAL at 20:22

## 2020-01-01 RX ADMIN — ASPIRIN 81 MG CHEWABLE TABLET 162 MG: 81 TABLET CHEWABLE at 07:59

## 2020-01-01 RX ADMIN — INSULIN ASPART 3 UNITS: 100 INJECTION, SOLUTION INTRAVENOUS; SUBCUTANEOUS at 21:05

## 2020-01-01 RX ADMIN — OXYCODONE HYDROCHLORIDE 10 MG: 10 TABLET ORAL at 08:17

## 2020-01-01 RX ADMIN — Medication 40 MG: at 07:49

## 2020-01-01 RX ADMIN — OXYCODONE HYDROCHLORIDE 10 MG: 10 TABLET ORAL at 15:08

## 2020-01-01 RX ADMIN — LORAZEPAM 2 MG: 2 TABLET ORAL at 22:07

## 2020-01-01 RX ADMIN — ASPIRIN 81 MG CHEWABLE TABLET 162 MG: 81 TABLET CHEWABLE at 21:01

## 2020-01-01 RX ADMIN — LORAZEPAM 4 MG: 2 TABLET ORAL at 21:30

## 2020-01-01 RX ADMIN — OXYCODONE HYDROCHLORIDE 10 MG: 10 TABLET ORAL at 16:02

## 2020-01-01 RX ADMIN — ACETAMINOPHEN 650 MG: 325 TABLET, FILM COATED ORAL at 09:04

## 2020-01-01 RX ADMIN — PIPERACILLIN SODIUM AND TAZOBACTAM SODIUM 4.5 G: 4; .5 INJECTION, POWDER, LYOPHILIZED, FOR SOLUTION INTRAVENOUS at 02:34

## 2020-01-01 RX ADMIN — DIPYRIDAMOLE 25 MG: 25 TABLET, FILM COATED ORAL at 20:01

## 2020-01-01 RX ADMIN — PANTOPRAZOLE SODIUM 40 MG: 40 INJECTION, POWDER, FOR SOLUTION INTRAVENOUS at 07:39

## 2020-01-01 RX ADMIN — ACETAMINOPHEN 650 MG: 325 TABLET, FILM COATED ORAL at 04:13

## 2020-01-01 RX ADMIN — THIAMINE HCL TAB 100 MG 100 MG: 100 TAB at 08:17

## 2020-01-01 RX ADMIN — DIPYRIDAMOLE 25 MG: 25 TABLET, FILM COATED ORAL at 14:52

## 2020-01-01 RX ADMIN — OXYCODONE HYDROCHLORIDE 10 MG: 10 TABLET ORAL at 12:27

## 2020-01-01 RX ADMIN — BIVALIRUDIN 0.27 MG/KG/HR: 250 INJECTION, POWDER, LYOPHILIZED, FOR SOLUTION INTRAVENOUS at 18:22

## 2020-01-01 RX ADMIN — Medication 20 NG/KG/MIN: at 22:51

## 2020-01-01 RX ADMIN — ASPIRIN 81 MG CHEWABLE TABLET 162 MG: 81 TABLET CHEWABLE at 20:38

## 2020-01-01 RX ADMIN — Medication 1 PACKET: at 19:51

## 2020-01-01 RX ADMIN — Medication 40 MG: at 09:10

## 2020-01-01 RX ADMIN — PIPERACILLIN SODIUM AND TAZOBACTAM SODIUM 4.5 G: 4; .5 INJECTION, POWDER, LYOPHILIZED, FOR SOLUTION INTRAVENOUS at 12:32

## 2020-01-01 RX ADMIN — DIPYRIDAMOLE 25 MG: 25 TABLET, FILM COATED ORAL at 08:22

## 2020-01-01 RX ADMIN — INSULIN ASPART 3 UNITS: 100 INJECTION, SOLUTION INTRAVENOUS; SUBCUTANEOUS at 04:20

## 2020-01-01 RX ADMIN — OXYCODONE HYDROCHLORIDE 10 MG: 10 TABLET ORAL at 11:28

## 2020-01-01 RX ADMIN — PIPERACILLIN SODIUM AND TAZOBACTAM SODIUM 4.5 G: 4; .5 INJECTION, POWDER, LYOPHILIZED, FOR SOLUTION INTRAVENOUS at 23:51

## 2020-01-01 RX ADMIN — PIPERACILLIN SODIUM AND TAZOBACTAM SODIUM 4.5 G: 4; .5 INJECTION, POWDER, LYOPHILIZED, FOR SOLUTION INTRAVENOUS at 17:59

## 2020-01-01 RX ADMIN — CEFTRIAXONE 1 G: 1 INJECTION, POWDER, FOR SOLUTION INTRAMUSCULAR; INTRAVENOUS at 12:12

## 2020-01-01 RX ADMIN — LIDOCAINE 1 PATCH: 560 PATCH PERCUTANEOUS; TOPICAL; TRANSDERMAL at 20:39

## 2020-01-01 RX ADMIN — PIPERACILLIN SODIUM AND TAZOBACTAM SODIUM 4.5 G: 4; .5 INJECTION, POWDER, LYOPHILIZED, FOR SOLUTION INTRAVENOUS at 12:33

## 2020-01-01 RX ADMIN — ALLOPURINOL 200 MG: 100 TABLET ORAL at 08:16

## 2020-01-01 RX ADMIN — INSULIN ASPART 1 UNITS: 100 INJECTION, SOLUTION INTRAVENOUS; SUBCUTANEOUS at 04:05

## 2020-01-01 RX ADMIN — Medication 50 MEQ: at 20:57

## 2020-01-01 RX ADMIN — SODIUM CHLORIDE: 9 INJECTION, SOLUTION INTRAVENOUS at 14:57

## 2020-01-01 RX ADMIN — FUROSEMIDE 20 MG: 10 INJECTION, SOLUTION INTRAVENOUS at 20:25

## 2020-01-01 RX ADMIN — Medication 37.5 MG: at 08:44

## 2020-01-01 RX ADMIN — LORAZEPAM 4 MG: 2 TABLET ORAL at 09:53

## 2020-01-01 RX ADMIN — AZITHROMYCIN MONOHYDRATE 500 MG: 250 TABLET ORAL at 13:02

## 2020-01-01 RX ADMIN — DIPYRIDAMOLE 25 MG: 25 TABLET, FILM COATED ORAL at 00:28

## 2020-01-01 RX ADMIN — PIPERACILLIN SODIUM AND TAZOBACTAM SODIUM 4.5 G: 4; .5 INJECTION, POWDER, LYOPHILIZED, FOR SOLUTION INTRAVENOUS at 09:10

## 2020-01-01 RX ADMIN — LORAZEPAM 0.5 MG: 2 INJECTION INTRAMUSCULAR; INTRAVENOUS at 16:43

## 2020-01-01 RX ADMIN — Medication 2 MCG/KG/MIN: at 07:25

## 2020-01-01 RX ADMIN — DIGOXIN 500 MCG: 0.25 INJECTION INTRAMUSCULAR; INTRAVENOUS at 06:07

## 2020-01-01 RX ADMIN — Medication 1 PACKET: at 08:18

## 2020-01-01 RX ADMIN — Medication 20 NG/KG/MIN: at 19:35

## 2020-01-01 RX ADMIN — ACETAMINOPHEN 650 MG: 325 TABLET, FILM COATED ORAL at 17:39

## 2020-01-01 RX ADMIN — ALLOPURINOL 200 MG: 100 TABLET ORAL at 08:23

## 2020-01-01 RX ADMIN — SODIUM CHLORIDE, POTASSIUM CHLORIDE, SODIUM LACTATE AND CALCIUM CHLORIDE 500 ML: 600; 310; 30; 20 INJECTION, SOLUTION INTRAVENOUS at 23:02

## 2020-01-01 RX ADMIN — INSULIN GLARGINE 20 UNITS: 100 INJECTION, SOLUTION SUBCUTANEOUS at 22:20

## 2020-01-01 RX ADMIN — INSULIN ASPART 2 UNITS: 100 INJECTION, SOLUTION INTRAVENOUS; SUBCUTANEOUS at 04:18

## 2020-01-01 RX ADMIN — POTASSIUM CHLORIDE 20 MEQ: 1.5 POWDER, FOR SOLUTION ORAL at 16:54

## 2020-01-01 RX ADMIN — INSULIN ASPART 4 UNITS: 100 INJECTION, SOLUTION INTRAVENOUS; SUBCUTANEOUS at 04:33

## 2020-01-01 RX ADMIN — DEXMEDETOMIDINE 0.6 MCG/KG/HR: 100 INJECTION, SOLUTION, CONCENTRATE INTRAVENOUS at 00:37

## 2020-01-01 RX ADMIN — CEFTRIAXONE 1 G: 1 INJECTION, POWDER, FOR SOLUTION INTRAMUSCULAR; INTRAVENOUS at 13:05

## 2020-01-01 RX ADMIN — INSULIN ASPART 3 UNITS: 100 INJECTION, SOLUTION INTRAVENOUS; SUBCUTANEOUS at 23:31

## 2020-01-01 RX ADMIN — DEXMEDETOMIDINE 0.6 MCG/KG/HR: 100 INJECTION, SOLUTION, CONCENTRATE INTRAVENOUS at 22:54

## 2020-01-01 RX ADMIN — LORAZEPAM 1 MG: 1 TABLET ORAL at 04:20

## 2020-01-01 RX ADMIN — DIPYRIDAMOLE 25 MG: 25 TABLET, FILM COATED ORAL at 20:03

## 2020-01-01 RX ADMIN — DOBUTAMINE 5 MCG/KG/MIN: 12.5 INJECTION, SOLUTION INTRAVENOUS at 20:28

## 2020-01-01 RX ADMIN — OXYCODONE HYDROCHLORIDE 10 MG: 10 TABLET ORAL at 03:43

## 2020-01-01 RX ADMIN — ACETAMINOPHEN 650 MG: 325 TABLET, FILM COATED ORAL at 08:05

## 2020-01-01 RX ADMIN — LORAZEPAM 4 MG: 2 TABLET ORAL at 10:16

## 2020-01-01 RX ADMIN — DEXAMETHASONE 6 MG: 2 TABLET ORAL at 12:33

## 2020-01-01 RX ADMIN — ALLOPURINOL 200 MG: 100 TABLET ORAL at 07:49

## 2020-01-01 RX ADMIN — THIAMINE HCL TAB 100 MG 100 MG: 100 TAB at 08:06

## 2020-01-01 RX ADMIN — ALBUMIN HUMAN 12.5 G: 0.25 SOLUTION INTRAVENOUS at 22:52

## 2020-01-01 RX ADMIN — DOBUTAMINE 5 MCG/KG/MIN: 12.5 INJECTION, SOLUTION INTRAVENOUS at 00:14

## 2020-01-01 RX ADMIN — Medication 1 PACKET: at 19:37

## 2020-01-01 RX ADMIN — DEXMEDETOMIDINE 0.5 MCG/KG/HR: 100 INJECTION, SOLUTION, CONCENTRATE INTRAVENOUS at 02:22

## 2020-01-01 RX ADMIN — PIPERACILLIN SODIUM AND TAZOBACTAM SODIUM 4.5 G: 4; .5 INJECTION, POWDER, LYOPHILIZED, FOR SOLUTION INTRAVENOUS at 05:38

## 2020-01-01 RX ADMIN — Medication 50 MCG: at 13:07

## 2020-01-01 RX ADMIN — OXYCODONE HYDROCHLORIDE 10 MG: 10 TABLET ORAL at 01:10

## 2020-01-01 RX ADMIN — SODIUM PHOSPHATE, MONOBASIC, MONOHYDRATE 15 MMOL: 276; 142 INJECTION, SOLUTION INTRAVENOUS at 13:24

## 2020-01-01 RX ADMIN — BIVALIRUDIN 0.22 MG/KG/HR: 250 INJECTION, POWDER, LYOPHILIZED, FOR SOLUTION INTRAVENOUS at 15:50

## 2020-01-01 RX ADMIN — INSULIN ASPART 1 UNITS: 100 INJECTION, SOLUTION INTRAVENOUS; SUBCUTANEOUS at 23:53

## 2020-01-01 RX ADMIN — OXYCODONE HYDROCHLORIDE 5 MG: 5 TABLET ORAL at 08:22

## 2020-01-01 RX ADMIN — ALLOPURINOL 200 MG: 100 TABLET ORAL at 20:52

## 2020-01-01 RX ADMIN — Medication 20 NG/KG/MIN: at 21:24

## 2020-01-01 RX ADMIN — Medication 50 MCG: at 00:40

## 2020-01-01 RX ADMIN — LORAZEPAM 1 MG: 1 TABLET ORAL at 21:53

## 2020-01-01 RX ADMIN — THIAMINE HCL TAB 100 MG 100 MG: 100 TAB at 08:43

## 2020-01-01 RX ADMIN — VANCOMYCIN HYDROCHLORIDE 1500 MG: 10 INJECTION, POWDER, LYOPHILIZED, FOR SOLUTION INTRAVENOUS at 10:14

## 2020-01-01 RX ADMIN — DOXYCYCLINE 100 MG: 100 CAPSULE ORAL at 08:43

## 2020-01-01 RX ADMIN — OXYCODONE HYDROCHLORIDE 5 MG: 5 TABLET ORAL at 08:05

## 2020-01-01 RX ADMIN — BIVALIRUDIN 0.22 MG/KG/HR: 250 INJECTION, POWDER, LYOPHILIZED, FOR SOLUTION INTRAVENOUS at 20:42

## 2020-01-01 RX ADMIN — DOCUSATE SODIUM AND SENNOSIDES 1 TABLET: 8.6; 5 TABLET ORAL at 22:00

## 2020-01-01 RX ADMIN — PIPERACILLIN SODIUM AND TAZOBACTAM SODIUM 4.5 G: 4; .5 INJECTION, POWDER, LYOPHILIZED, FOR SOLUTION INTRAVENOUS at 18:30

## 2020-01-01 RX ADMIN — Medication 100 MCG: at 14:09

## 2020-01-01 RX ADMIN — OXYCODONE HYDROCHLORIDE 5 MG: 5 TABLET ORAL at 08:23

## 2020-01-01 RX ADMIN — SODIUM CHLORIDE, POTASSIUM CHLORIDE, SODIUM LACTATE AND CALCIUM CHLORIDE 250 ML: 600; 310; 30; 20 INJECTION, SOLUTION INTRAVENOUS at 16:02

## 2020-01-01 RX ADMIN — Medication 2 MCG/KG/MIN: at 10:10

## 2020-01-01 RX ADMIN — Medication 20 NG/KG/MIN: at 22:57

## 2020-01-01 RX ADMIN — THIAMINE HCL TAB 100 MG 100 MG: 100 TAB at 20:52

## 2020-01-01 RX ADMIN — THIAMINE HCL TAB 100 MG 100 MG: 100 TAB at 08:09

## 2020-01-01 RX ADMIN — Medication 50 MCG/HR: at 11:51

## 2020-01-01 RX ADMIN — DEXMEDETOMIDINE 0.7 MCG/KG/HR: 100 INJECTION, SOLUTION, CONCENTRATE INTRAVENOUS at 04:05

## 2020-01-01 RX ADMIN — OXYCODONE HYDROCHLORIDE 5 MG: 5 TABLET ORAL at 08:21

## 2020-01-01 RX ADMIN — MAGNESIUM SULFATE IN WATER 2 G: 40 INJECTION, SOLUTION INTRAVENOUS at 16:56

## 2020-01-01 RX ADMIN — ASPIRIN 81 MG CHEWABLE TABLET 162 MG: 81 TABLET CHEWABLE at 08:35

## 2020-01-01 RX ADMIN — DEXMEDETOMIDINE 0.5 MCG/KG/HR: 100 INJECTION, SOLUTION, CONCENTRATE INTRAVENOUS at 10:18

## 2020-01-01 RX ADMIN — Medication 20 NG/KG/MIN: at 02:48

## 2020-01-01 RX ADMIN — ALLOPURINOL 200 MG: 100 TABLET ORAL at 07:32

## 2020-01-01 RX ADMIN — ASPIRIN 81 MG CHEWABLE TABLET 162 MG: 81 TABLET CHEWABLE at 08:09

## 2020-01-01 RX ADMIN — ALLOPURINOL 200 MG: 100 TABLET ORAL at 08:34

## 2020-01-01 RX ADMIN — ASPIRIN 81 MG CHEWABLE TABLET 162 MG: 81 TABLET CHEWABLE at 09:05

## 2020-01-01 RX ADMIN — THIAMINE HCL TAB 100 MG 100 MG: 100 TAB at 07:33

## 2020-01-01 RX ADMIN — PIPERACILLIN SODIUM AND TAZOBACTAM SODIUM 4.5 G: 4; .5 INJECTION, POWDER, LYOPHILIZED, FOR SOLUTION INTRAVENOUS at 20:05

## 2020-01-01 RX ADMIN — POLYETHYLENE GLYCOL 3350 17 G: 17 POWDER, FOR SOLUTION ORAL at 08:17

## 2020-01-01 RX ADMIN — AMIODARONE HYDROCHLORIDE 0.5 MG/MIN: 50 INJECTION, SOLUTION INTRAVENOUS at 23:04

## 2020-01-01 RX ADMIN — POTASSIUM PHOSPHATE, MONOBASIC AND POTASSIUM PHOSPHATE, DIBASIC 15 MMOL: 224; 236 INJECTION, SOLUTION INTRAVENOUS at 06:10

## 2020-01-01 RX ADMIN — Medication 0.02 MCG/KG/MIN: at 16:45

## 2020-01-01 RX ADMIN — OXYCODONE HYDROCHLORIDE 5 MG: 5 TABLET ORAL at 20:01

## 2020-01-01 RX ADMIN — ASPIRIN 81 MG CHEWABLE TABLET 162 MG: 81 TABLET CHEWABLE at 08:17

## 2020-01-01 RX ADMIN — Medication 1 PACKET: at 21:02

## 2020-01-01 RX ADMIN — Medication 20 NG/KG/MIN: at 14:57

## 2020-01-01 RX ADMIN — REMDESIVIR 200 MG: 100 INJECTION, POWDER, LYOPHILIZED, FOR SOLUTION INTRAVENOUS at 16:53

## 2020-01-01 RX ADMIN — BUMETANIDE 1 MG: 0.25 INJECTION, SOLUTION INTRAMUSCULAR; INTRAVENOUS at 10:34

## 2020-01-01 RX ADMIN — BIVALIRUDIN 0.22 MG/KG/HR: 250 INJECTION, POWDER, LYOPHILIZED, FOR SOLUTION INTRAVENOUS at 04:52

## 2020-01-01 RX ADMIN — ACETAMINOPHEN 650 MG: 325 TABLET, FILM COATED ORAL at 19:38

## 2020-01-01 RX ADMIN — OXYCODONE HYDROCHLORIDE 5 MG: 5 TABLET ORAL at 03:36

## 2020-01-01 RX ADMIN — Medication 1 PACKET: at 20:01

## 2020-01-01 RX ADMIN — CALCIUM GLUCONATE 1 G: 98 INJECTION, SOLUTION INTRAVENOUS at 06:31

## 2020-01-01 RX ADMIN — DIPYRIDAMOLE 25 MG: 25 TABLET, FILM COATED ORAL at 19:39

## 2020-01-01 RX ADMIN — PIPERACILLIN SODIUM AND TAZOBACTAM SODIUM 4.5 G: 4; .5 INJECTION, POWDER, LYOPHILIZED, FOR SOLUTION INTRAVENOUS at 17:58

## 2020-01-01 RX ADMIN — INSULIN ASPART 5 UNITS: 100 INJECTION, SOLUTION INTRAVENOUS; SUBCUTANEOUS at 20:51

## 2020-01-01 RX ADMIN — PIPERACILLIN SODIUM AND TAZOBACTAM SODIUM 4.5 G: 4; .5 INJECTION, POWDER, LYOPHILIZED, FOR SOLUTION INTRAVENOUS at 17:45

## 2020-01-01 RX ADMIN — INSULIN ASPART 4 UNITS: 100 INJECTION, SOLUTION INTRAVENOUS; SUBCUTANEOUS at 23:43

## 2020-01-01 RX ADMIN — PIPERACILLIN SODIUM AND TAZOBACTAM SODIUM 4.5 G: 4; .5 INJECTION, POWDER, LYOPHILIZED, FOR SOLUTION INTRAVENOUS at 06:23

## 2020-01-01 RX ADMIN — CEFTRIAXONE 1 G: 1 INJECTION, POWDER, FOR SOLUTION INTRAMUSCULAR; INTRAVENOUS at 11:31

## 2020-01-01 RX ADMIN — Medication 20 NG/KG/MIN: at 14:39

## 2020-01-01 RX ADMIN — INSULIN ASPART 2 UNITS: 100 INJECTION, SOLUTION INTRAVENOUS; SUBCUTANEOUS at 12:18

## 2020-01-01 RX ADMIN — ASPIRIN 81 MG CHEWABLE TABLET 81 MG: 81 TABLET CHEWABLE at 08:43

## 2020-01-01 RX ADMIN — Medication 1 PACKET: at 08:26

## 2020-01-01 RX ADMIN — VANCOMYCIN HYDROCHLORIDE 1750 MG: 10 INJECTION, POWDER, LYOPHILIZED, FOR SOLUTION INTRAVENOUS at 12:53

## 2020-01-01 RX ADMIN — ALLOPURINOL 200 MG: 100 TABLET ORAL at 09:04

## 2020-01-01 RX ADMIN — Medication 40 MG: at 08:09

## 2020-01-01 RX ADMIN — INSULIN ASPART 5 UNITS: 100 INJECTION, SOLUTION INTRAVENOUS; SUBCUTANEOUS at 03:53

## 2020-01-01 RX ADMIN — Medication 20 NG/KG/MIN: at 17:58

## 2020-01-01 RX ADMIN — Medication 40 MG: at 07:59

## 2020-01-01 RX ADMIN — LORAZEPAM 1 MG: 1 TABLET ORAL at 15:40

## 2020-01-01 RX ADMIN — Medication 1 PACKET: at 07:49

## 2020-01-01 RX ADMIN — PIPERACILLIN SODIUM AND TAZOBACTAM SODIUM 4.5 G: 4; .5 INJECTION, POWDER, LYOPHILIZED, FOR SOLUTION INTRAVENOUS at 06:30

## 2020-01-01 RX ADMIN — INSULIN ASPART 3 UNITS: 100 INJECTION, SOLUTION INTRAVENOUS; SUBCUTANEOUS at 12:41

## 2020-01-01 RX ADMIN — ADENOSINE 6 MG: 3 INJECTION, SOLUTION INTRAVENOUS at 13:36

## 2020-01-01 RX ADMIN — DEXMEDETOMIDINE 0.7 MCG/KG/HR: 100 INJECTION, SOLUTION, CONCENTRATE INTRAVENOUS at 16:24

## 2020-01-01 RX ADMIN — INSULIN ASPART 5 UNITS: 100 INJECTION, SOLUTION INTRAVENOUS; SUBCUTANEOUS at 04:14

## 2020-01-01 RX ADMIN — DEXAMETHASONE 6 MG: 2 TABLET ORAL at 13:48

## 2020-01-01 RX ADMIN — MAGNESIUM SULFATE IN WATER 2 G: 40 INJECTION, SOLUTION INTRAVENOUS at 18:24

## 2020-01-01 RX ADMIN — DIPYRIDAMOLE 25 MG: 25 TABLET, FILM COATED ORAL at 20:14

## 2020-01-01 RX ADMIN — INSULIN ASPART 2 UNITS: 100 INJECTION, SOLUTION INTRAVENOUS; SUBCUTANEOUS at 12:44

## 2020-01-01 RX ADMIN — ALLOPURINOL 200 MG: 100 TABLET ORAL at 07:40

## 2020-01-01 RX ADMIN — ALBUMIN HUMAN 25 G: 50 SOLUTION INTRAVENOUS at 13:56

## 2020-01-01 RX ADMIN — SODIUM CHLORIDE, POTASSIUM CHLORIDE, SODIUM LACTATE AND CALCIUM CHLORIDE 250 ML: 600; 310; 30; 20 INJECTION, SOLUTION INTRAVENOUS at 15:37

## 2020-01-01 RX ADMIN — DEXAMETHASONE 6 MG: 2 TABLET ORAL at 13:10

## 2020-01-01 RX ADMIN — BIVALIRUDIN 0.16 MG/KG/HR: 250 INJECTION, POWDER, LYOPHILIZED, FOR SOLUTION INTRAVENOUS at 16:06

## 2020-01-01 RX ADMIN — SODIUM CHLORIDE, POTASSIUM CHLORIDE, SODIUM LACTATE AND CALCIUM CHLORIDE 250 ML: 600; 310; 30; 20 INJECTION, SOLUTION INTRAVENOUS at 11:51

## 2020-01-01 RX ADMIN — Medication 1 PACKET: at 08:07

## 2020-01-01 RX ADMIN — INSULIN ASPART 4 UNITS: 100 INJECTION, SOLUTION INTRAVENOUS; SUBCUTANEOUS at 08:42

## 2020-01-01 RX ADMIN — DOXYCYCLINE 100 MG: 100 CAPSULE ORAL at 08:09

## 2020-01-01 RX ADMIN — Medication 50 MCG: at 20:30

## 2020-01-01 RX ADMIN — PIPERACILLIN SODIUM AND TAZOBACTAM SODIUM 4.5 G: 4; .5 INJECTION, POWDER, LYOPHILIZED, FOR SOLUTION INTRAVENOUS at 11:53

## 2020-01-01 RX ADMIN — ACETAMINOPHEN 650 MG: 325 TABLET, FILM COATED ORAL at 16:28

## 2020-01-01 RX ADMIN — INSULIN ASPART 2 UNITS: 100 INJECTION, SOLUTION INTRAVENOUS; SUBCUTANEOUS at 08:43

## 2020-01-01 RX ADMIN — Medication 100 MCG/HR: at 16:37

## 2020-01-01 RX ADMIN — SODIUM PHOSPHATE, MONOBASIC, MONOHYDRATE 15 MMOL: 276; 142 INJECTION, SOLUTION INTRAVENOUS at 05:09

## 2020-01-01 RX ADMIN — LORAZEPAM 2 MG: 2 TABLET ORAL at 10:24

## 2020-01-01 RX ADMIN — DEXMEDETOMIDINE 1 MCG/KG/HR: 100 INJECTION, SOLUTION, CONCENTRATE INTRAVENOUS at 05:03

## 2020-01-01 RX ADMIN — MIDAZOLAM 1 MG: 1 INJECTION INTRAMUSCULAR; INTRAVENOUS at 12:54

## 2020-01-01 RX ADMIN — INSULIN ASPART 6 UNITS: 100 INJECTION, SOLUTION INTRAVENOUS; SUBCUTANEOUS at 20:24

## 2020-01-01 RX ADMIN — OXYCODONE HYDROCHLORIDE 5 MG: 5 TABLET ORAL at 11:43

## 2020-01-01 RX ADMIN — THIAMINE HCL TAB 100 MG 100 MG: 100 TAB at 08:21

## 2020-01-01 RX ADMIN — INSULIN ASPART 3 UNITS: 100 INJECTION, SOLUTION INTRAVENOUS; SUBCUTANEOUS at 03:59

## 2020-01-01 RX ADMIN — POLYETHYLENE GLYCOL 3350 17 G: 17 POWDER, FOR SOLUTION ORAL at 07:40

## 2020-01-01 RX ADMIN — ACETAMINOPHEN 650 MG: 325 TABLET, FILM COATED ORAL at 15:06

## 2020-01-01 RX ADMIN — Medication 0.1 MCG/KG/MIN: at 07:26

## 2020-01-01 RX ADMIN — Medication 1 PACKET: at 08:24

## 2020-01-01 RX ADMIN — Medication 37.5 MG: at 13:06

## 2020-01-01 RX ADMIN — INSULIN ASPART 3 UNITS: 100 INJECTION, SOLUTION INTRAVENOUS; SUBCUTANEOUS at 08:39

## 2020-01-01 RX ADMIN — SODIUM CHLORIDE, POTASSIUM CHLORIDE, SODIUM LACTATE AND CALCIUM CHLORIDE 500 ML: 600; 310; 30; 20 INJECTION, SOLUTION INTRAVENOUS at 16:33

## 2020-01-01 RX ADMIN — LORAZEPAM 4 MG: 2 TABLET ORAL at 15:55

## 2020-01-01 RX ADMIN — Medication 50 MCG: at 03:10

## 2020-01-01 RX ADMIN — OXYCODONE HYDROCHLORIDE 5 MG: 5 TABLET ORAL at 20:14

## 2020-01-01 RX ADMIN — DOBUTAMINE 5 MCG/KG/MIN: 12.5 INJECTION, SOLUTION INTRAVENOUS at 06:16

## 2020-01-01 RX ADMIN — LORAZEPAM 1 MG: 2 INJECTION INTRAMUSCULAR; INTRAVENOUS at 17:38

## 2020-01-01 RX ADMIN — ASPIRIN 81 MG CHEWABLE TABLET 162 MG: 81 TABLET CHEWABLE at 19:53

## 2020-01-01 RX ADMIN — IOPAMIDOL 100 ML: 755 INJECTION, SOLUTION INTRAVENOUS at 16:49

## 2020-01-01 RX ADMIN — THIAMINE HCL TAB 100 MG 100 MG: 100 TAB at 07:49

## 2020-01-01 RX ADMIN — PHYTONADIONE 2 MG: 10 INJECTION, EMULSION INTRAMUSCULAR; INTRAVENOUS; SUBCUTANEOUS at 15:16

## 2020-01-01 RX ADMIN — PIPERACILLIN SODIUM AND TAZOBACTAM SODIUM 4.5 G: 4; .5 INJECTION, POWDER, LYOPHILIZED, FOR SOLUTION INTRAVENOUS at 14:39

## 2020-01-01 RX ADMIN — BIVALIRUDIN 0.27 MG/KG/HR: 250 INJECTION, POWDER, LYOPHILIZED, FOR SOLUTION INTRAVENOUS at 16:04

## 2020-01-01 RX ADMIN — LORAZEPAM 2 MG: 2 INJECTION INTRAMUSCULAR; INTRAVENOUS at 17:46

## 2020-01-01 RX ADMIN — BIVALIRUDIN 0.22 MG/KG/HR: 250 INJECTION, POWDER, LYOPHILIZED, FOR SOLUTION INTRAVENOUS at 00:29

## 2020-01-01 RX ADMIN — DEXMEDETOMIDINE 0.2 MCG/KG/HR: 100 INJECTION, SOLUTION, CONCENTRATE INTRAVENOUS at 01:49

## 2020-01-01 RX ADMIN — VANCOMYCIN HYDROCHLORIDE 1500 MG: 10 INJECTION, POWDER, LYOPHILIZED, FOR SOLUTION INTRAVENOUS at 22:05

## 2020-01-01 RX ADMIN — LORAZEPAM 1 MG: 1 TABLET ORAL at 22:09

## 2020-01-01 RX ADMIN — ALLOPURINOL 200 MG: 100 TABLET ORAL at 08:06

## 2020-01-01 RX ADMIN — FENTANYL CITRATE 50 MCG: 50 INJECTION, SOLUTION INTRAMUSCULAR; INTRAVENOUS at 12:46

## 2020-01-01 RX ADMIN — OXYCODONE HYDROCHLORIDE 5 MG: 5 TABLET ORAL at 23:57

## 2020-01-01 RX ADMIN — Medication 75 MCG/HR: at 22:05

## 2020-01-01 RX ADMIN — DEXAMETHASONE 6 MG: 2 TABLET ORAL at 12:53

## 2020-01-01 RX ADMIN — INSULIN ASPART 3 UNITS: 100 INJECTION, SOLUTION INTRAVENOUS; SUBCUTANEOUS at 20:31

## 2020-01-01 RX ADMIN — DIPYRIDAMOLE 25 MG: 25 TABLET, FILM COATED ORAL at 13:10

## 2020-01-01 RX ADMIN — LORAZEPAM 4 MG: 2 TABLET ORAL at 04:41

## 2020-01-01 RX ADMIN — LORAZEPAM 4 MG: 2 TABLET ORAL at 04:11

## 2020-01-01 RX ADMIN — INSULIN ASPART 1 UNITS: 100 INJECTION, SOLUTION INTRAVENOUS; SUBCUTANEOUS at 11:49

## 2020-01-01 RX ADMIN — INSULIN ASPART 2 UNITS: 100 INJECTION, SOLUTION INTRAVENOUS; SUBCUTANEOUS at 00:17

## 2020-01-01 RX ADMIN — PIPERACILLIN SODIUM AND TAZOBACTAM SODIUM 4.5 G: 4; .5 INJECTION, POWDER, LYOPHILIZED, FOR SOLUTION INTRAVENOUS at 06:06

## 2020-01-01 RX ADMIN — DEXMEDETOMIDINE 0.4 MCG/KG/HR: 100 INJECTION, SOLUTION, CONCENTRATE INTRAVENOUS at 15:17

## 2020-01-01 RX ADMIN — DOBUTAMINE 2.5 MCG/KG/MIN: 12.5 INJECTION, SOLUTION INTRAVENOUS at 20:43

## 2020-01-01 RX ADMIN — FENTANYL CITRATE 50 MCG: 50 INJECTION, SOLUTION INTRAMUSCULAR; INTRAVENOUS at 10:41

## 2020-01-01 RX ADMIN — FUROSEMIDE 20 MG: 10 INJECTION, SOLUTION INTRAVENOUS at 11:43

## 2020-01-01 RX ADMIN — DOCUSATE SODIUM AND SENNOSIDES 2 TABLET: 8.6; 5 TABLET ORAL at 21:30

## 2020-01-01 RX ADMIN — HUMAN ALBUMIN MICROSPHERES AND PERFLUTREN 6 ML: 10; .22 INJECTION, SOLUTION INTRAVENOUS at 20:30

## 2020-01-01 RX ADMIN — OXYCODONE HYDROCHLORIDE 10 MG: 10 TABLET ORAL at 12:15

## 2020-01-01 RX ADMIN — INSULIN ASPART 1 UNITS: 100 INJECTION, SOLUTION INTRAVENOUS; SUBCUTANEOUS at 08:10

## 2020-01-01 RX ADMIN — DIPYRIDAMOLE 25 MG: 25 TABLET, FILM COATED ORAL at 07:49

## 2020-01-01 RX ADMIN — LORAZEPAM 2 MG: 2 TABLET ORAL at 16:17

## 2020-01-01 RX ADMIN — Medication 0.03 MCG/KG/MIN: at 22:20

## 2020-01-01 RX ADMIN — DOXYCYCLINE 100 MG: 100 CAPSULE ORAL at 07:38

## 2020-01-01 RX ADMIN — DEXMEDETOMIDINE 0.6 MCG/KG/HR: 100 INJECTION, SOLUTION, CONCENTRATE INTRAVENOUS at 04:27

## 2020-01-01 RX ADMIN — VANCOMYCIN HYDROCHLORIDE 1500 MG: 10 INJECTION, POWDER, LYOPHILIZED, FOR SOLUTION INTRAVENOUS at 10:10

## 2020-01-01 RX ADMIN — POTASSIUM CHLORIDE 20 MEQ: 1.5 POWDER, FOR SOLUTION ORAL at 08:34

## 2020-01-01 RX ADMIN — Medication 100 MCG: at 15:15

## 2020-01-01 RX ADMIN — FUROSEMIDE 40 MG: 10 INJECTION, SOLUTION INTRAVENOUS at 09:04

## 2020-01-01 RX ADMIN — POTASSIUM CHLORIDE 20 MEQ: 1.5 POWDER, FOR SOLUTION ORAL at 05:03

## 2020-01-01 RX ADMIN — VANCOMYCIN HYDROCHLORIDE 1500 MG: 10 INJECTION, POWDER, LYOPHILIZED, FOR SOLUTION INTRAVENOUS at 22:10

## 2020-01-01 RX ADMIN — DIPYRIDAMOLE 25 MG: 25 TABLET, FILM COATED ORAL at 21:02

## 2020-01-01 RX ADMIN — MIDAZOLAM (PF) 1 MG/ML IN 0.9 % SODIUM CHLORIDE INTRAVENOUS SOLUTION 2 MG/HR: at 00:43

## 2020-01-01 RX ADMIN — Medication 20 NG/KG/MIN: at 06:17

## 2020-01-01 RX ADMIN — Medication 20 NG/KG/MIN: at 09:38

## 2020-01-01 RX ADMIN — PROPOFOL 30 MCG/KG/MIN: 10 INJECTION, EMULSION INTRAVENOUS at 01:23

## 2020-01-01 RX ADMIN — LORAZEPAM 1 MG: 1 TABLET ORAL at 16:23

## 2020-01-01 RX ADMIN — Medication 1 PACKET: at 20:04

## 2020-01-01 RX ADMIN — Medication 1 PACKET: at 08:22

## 2020-01-01 RX ADMIN — Medication 2 UNITS/HR: at 14:03

## 2020-01-01 RX ADMIN — ACETAMINOPHEN 650 MG: 325 TABLET, FILM COATED ORAL at 07:40

## 2020-01-01 RX ADMIN — LORAZEPAM 1 MG: 1 TABLET ORAL at 21:48

## 2020-01-01 RX ADMIN — OXYCODONE HYDROCHLORIDE 5 MG: 5 TABLET ORAL at 19:58

## 2020-01-01 RX ADMIN — ASPIRIN 81 MG CHEWABLE TABLET 162 MG: 81 TABLET CHEWABLE at 08:23

## 2020-01-01 RX ADMIN — ALLOPURINOL 200 MG: 100 TABLET ORAL at 07:39

## 2020-01-01 RX ADMIN — Medication 100 MG: at 18:17

## 2020-01-01 RX ADMIN — DIPYRIDAMOLE 25 MG: 25 TABLET, FILM COATED ORAL at 07:59

## 2020-01-01 RX ADMIN — AMIODARONE HYDROCHLORIDE 150 MG: 1.5 INJECTION, SOLUTION INTRAVENOUS at 13:55

## 2020-01-01 RX ADMIN — Medication 50 MCG: at 06:00

## 2020-01-01 RX ADMIN — FUROSEMIDE 40 MG: 10 INJECTION, SOLUTION INTRAVENOUS at 17:42

## 2020-01-01 RX ADMIN — OXYCODONE HYDROCHLORIDE 5 MG: 5 TABLET ORAL at 02:34

## 2020-01-01 RX ADMIN — INSULIN ASPART 1 UNITS: 100 INJECTION, SOLUTION INTRAVENOUS; SUBCUTANEOUS at 19:59

## 2020-01-01 RX ADMIN — Medication 75 MCG/HR: at 11:59

## 2020-01-01 RX ADMIN — Medication 20 NG/KG/MIN: at 20:44

## 2020-01-01 RX ADMIN — DIPYRIDAMOLE 25 MG: 25 TABLET, FILM COATED ORAL at 08:10

## 2020-01-01 RX ADMIN — PIPERACILLIN SODIUM AND TAZOBACTAM SODIUM 4.5 G: 4; .5 INJECTION, POWDER, LYOPHILIZED, FOR SOLUTION INTRAVENOUS at 06:00

## 2020-01-01 RX ADMIN — Medication 0.07 MCG/KG/MIN: at 00:20

## 2020-01-01 RX ADMIN — BIVALIRUDIN 0.27 MG/KG/HR: 250 INJECTION, POWDER, LYOPHILIZED, FOR SOLUTION INTRAVENOUS at 04:27

## 2020-01-01 RX ADMIN — ACETAMINOPHEN 650 MG: 325 TABLET, FILM COATED ORAL at 13:51

## 2020-01-01 RX ADMIN — SODIUM THIOSULFATE 0.25 MCG/KG/MIN: 250 INJECTION, SOLUTION INTRAVENOUS at 21:12

## 2020-01-01 RX ADMIN — INSULIN ASPART 3 UNITS: 100 INJECTION, SOLUTION INTRAVENOUS; SUBCUTANEOUS at 00:53

## 2020-01-01 RX ADMIN — AMIODARONE HYDROCHLORIDE 1 MG/MIN: 50 INJECTION, SOLUTION INTRAVENOUS at 16:13

## 2020-01-01 RX ADMIN — OXYCODONE HYDROCHLORIDE 10 MG: 10 TABLET ORAL at 20:19

## 2020-01-01 RX ADMIN — DEXAMETHASONE 6 MG: 2 TABLET ORAL at 13:57

## 2020-01-01 RX ADMIN — ALLOPURINOL 200 MG: 100 TABLET ORAL at 08:05

## 2020-01-01 RX ADMIN — OXYCODONE HYDROCHLORIDE 5 MG: 5 TABLET ORAL at 09:04

## 2020-01-01 RX ADMIN — INSULIN ASPART 4 UNITS: 100 INJECTION, SOLUTION INTRAVENOUS; SUBCUTANEOUS at 08:53

## 2020-01-01 RX ADMIN — Medication 20 NG/KG/MIN: at 20:12

## 2020-01-01 RX ADMIN — Medication 20 NG/KG/MIN: at 10:27

## 2020-01-01 RX ADMIN — DOXYCYCLINE 100 MG: 100 CAPSULE ORAL at 19:51

## 2020-01-01 RX ADMIN — ALLOPURINOL 200 MG: 100 TABLET ORAL at 08:42

## 2020-01-01 RX ADMIN — DIPYRIDAMOLE 25 MG: 25 TABLET, FILM COATED ORAL at 08:05

## 2020-01-01 RX ADMIN — Medication 40 MG: at 07:42

## 2020-01-01 RX ADMIN — Medication 2 MG/HR: at 18:36

## 2020-01-01 RX ADMIN — DEXMEDETOMIDINE 0.3 MCG/KG/HR: 100 INJECTION, SOLUTION, CONCENTRATE INTRAVENOUS at 01:56

## 2020-01-01 RX ADMIN — VANCOMYCIN HYDROCHLORIDE 1500 MG: 10 INJECTION, POWDER, LYOPHILIZED, FOR SOLUTION INTRAVENOUS at 09:44

## 2020-01-01 RX ADMIN — HEPARIN SODIUM 1200 UNITS/HR: 10000 INJECTION, SOLUTION INTRAVENOUS at 16:00

## 2020-01-01 RX ADMIN — THIAMINE HCL TAB 100 MG 100 MG: 100 TAB at 07:40

## 2020-01-01 RX ADMIN — INSULIN ASPART 2 UNITS: 100 INJECTION, SOLUTION INTRAVENOUS; SUBCUTANEOUS at 23:59

## 2020-01-01 RX ADMIN — Medication 50 MCG: at 09:00

## 2020-01-01 RX ADMIN — PIPERACILLIN SODIUM AND TAZOBACTAM SODIUM 4.5 G: 4; .5 INJECTION, POWDER, LYOPHILIZED, FOR SOLUTION INTRAVENOUS at 13:53

## 2020-01-01 RX ADMIN — ALLOPURINOL 200 MG: 100 TABLET ORAL at 07:59

## 2020-01-01 RX ADMIN — ASPIRIN 81 MG CHEWABLE TABLET 162 MG: 81 TABLET CHEWABLE at 19:58

## 2020-01-01 RX ADMIN — INSULIN ASPART 2 UNITS: 100 INJECTION, SOLUTION INTRAVENOUS; SUBCUTANEOUS at 12:59

## 2020-01-01 RX ADMIN — DEXMEDETOMIDINE 0.4 MCG/KG/HR: 100 INJECTION, SOLUTION, CONCENTRATE INTRAVENOUS at 08:18

## 2020-01-01 RX ADMIN — Medication 1 PACKET: at 19:55

## 2020-01-01 RX ADMIN — Medication 20 NG/KG/MIN: at 08:59

## 2020-01-01 RX ADMIN — LORAZEPAM 4 MG: 2 TABLET ORAL at 03:59

## 2020-01-01 RX ADMIN — INSULIN ASPART 1 UNITS: 100 INJECTION, SOLUTION INTRAVENOUS; SUBCUTANEOUS at 07:43

## 2020-01-01 RX ADMIN — Medication 75 MCG/HR: at 00:19

## 2020-01-01 RX ADMIN — LORAZEPAM 4 MG: 2 TABLET ORAL at 09:20

## 2020-01-01 RX ADMIN — ACETAMINOPHEN 650 MG: 325 TABLET, FILM COATED ORAL at 03:20

## 2020-01-01 RX ADMIN — IOPAMIDOL 120 ML: 755 INJECTION, SOLUTION INTRAVENOUS at 14:25

## 2020-01-01 RX ADMIN — Medication 800 MCG: at 15:00

## 2020-01-01 RX ADMIN — Medication 2 UNITS/HR: at 00:29

## 2020-01-01 RX ADMIN — PIPERACILLIN SODIUM AND TAZOBACTAM SODIUM 4.5 G: 4; .5 INJECTION, POWDER, LYOPHILIZED, FOR SOLUTION INTRAVENOUS at 20:31

## 2020-01-01 RX ADMIN — OXYCODONE HYDROCHLORIDE 10 MG: 10 TABLET ORAL at 06:30

## 2020-01-01 RX ADMIN — INSULIN ASPART 7 UNITS: 100 INJECTION, SOLUTION INTRAVENOUS; SUBCUTANEOUS at 00:24

## 2020-01-01 RX ADMIN — DIPYRIDAMOLE 25 MG: 25 TABLET, FILM COATED ORAL at 20:24

## 2020-01-01 RX ADMIN — POLYETHYLENE GLYCOL 3350 17 G: 17 POWDER, FOR SOLUTION ORAL at 08:09

## 2020-01-01 RX ADMIN — PIPERACILLIN SODIUM AND TAZOBACTAM SODIUM 4.5 G: 4; .5 INJECTION, POWDER, LYOPHILIZED, FOR SOLUTION INTRAVENOUS at 00:15

## 2020-01-01 RX ADMIN — IOPAMIDOL 100 ML: 755 INJECTION, SOLUTION INTRAVENOUS at 01:21

## 2020-01-01 RX ADMIN — BUMETANIDE 2 MG: 0.25 INJECTION, SOLUTION INTRAMUSCULAR; INTRAVENOUS at 09:16

## 2020-01-01 RX ADMIN — ASPIRIN 81 MG CHEWABLE TABLET 162 MG: 81 TABLET CHEWABLE at 08:05

## 2020-01-01 RX ADMIN — Medication 20 NG/KG/MIN: at 02:19

## 2020-01-01 RX ADMIN — INSULIN ASPART 5 UNITS: 100 INJECTION, SOLUTION INTRAVENOUS; SUBCUTANEOUS at 03:36

## 2020-01-01 RX ADMIN — DEXAMETHASONE 6 MG: 2 TABLET ORAL at 13:16

## 2020-01-01 RX ADMIN — HEPARIN SODIUM 1600 UNITS/HR: 10000 INJECTION, SOLUTION INTRAVENOUS at 11:01

## 2020-01-01 RX ADMIN — DOBUTAMINE 2.5 MCG/KG/MIN: 12.5 INJECTION, SOLUTION INTRAVENOUS at 05:28

## 2020-01-01 RX ADMIN — BIVALIRUDIN 0.15 MG/KG/HR: 250 INJECTION, POWDER, LYOPHILIZED, FOR SOLUTION INTRAVENOUS at 00:17

## 2020-01-01 RX ADMIN — Medication 50 MCG/HR: at 14:50

## 2020-01-01 RX ADMIN — POTASSIUM CHLORIDE 20 MEQ: 1.5 POWDER, FOR SOLUTION ORAL at 05:53

## 2020-01-01 RX ADMIN — OXYCODONE HYDROCHLORIDE 10 MG: 10 TABLET ORAL at 23:43

## 2020-01-01 RX ADMIN — VANCOMYCIN HYDROCHLORIDE 1500 MG: 10 INJECTION, POWDER, LYOPHILIZED, FOR SOLUTION INTRAVENOUS at 21:52

## 2020-01-01 RX ADMIN — DIPYRIDAMOLE 25 MG: 25 TABLET, FILM COATED ORAL at 08:34

## 2020-01-01 RX ADMIN — AMIODARONE HYDROCHLORIDE 200 MG: 200 TABLET ORAL at 07:40

## 2020-01-01 RX ADMIN — PIPERACILLIN SODIUM AND TAZOBACTAM SODIUM 4.5 G: 4; .5 INJECTION, POWDER, LYOPHILIZED, FOR SOLUTION INTRAVENOUS at 06:19

## 2020-01-01 RX ADMIN — INSULIN ASPART 3 UNITS: 100 INJECTION, SOLUTION INTRAVENOUS; SUBCUTANEOUS at 16:18

## 2020-01-01 RX ADMIN — REMDESIVIR 100 MG: 100 INJECTION, POWDER, LYOPHILIZED, FOR SOLUTION INTRAVENOUS at 13:13

## 2020-01-01 RX ADMIN — DEXMEDETOMIDINE 0.5 MCG/KG/HR: 100 INJECTION, SOLUTION, CONCENTRATE INTRAVENOUS at 11:33

## 2020-01-01 RX ADMIN — DOXYCYCLINE 100 MG: 100 CAPSULE ORAL at 20:19

## 2020-01-01 RX ADMIN — POTASSIUM & SODIUM PHOSPHATES POWDER PACK 280-160-250 MG 1 PACKET: 280-160-250 PACK at 08:53

## 2020-01-01 RX ADMIN — OXYCODONE HYDROCHLORIDE 10 MG: 10 TABLET ORAL at 07:49

## 2020-01-01 RX ADMIN — BIVALIRUDIN 0.22 MG/KG/HR: 250 INJECTION, POWDER, LYOPHILIZED, FOR SOLUTION INTRAVENOUS at 08:33

## 2020-01-01 RX ADMIN — DEXMEDETOMIDINE 0.7 MCG/KG/HR: 100 INJECTION, SOLUTION, CONCENTRATE INTRAVENOUS at 21:11

## 2020-01-01 RX ADMIN — Medication 40 MG: at 08:06

## 2020-01-01 RX ADMIN — INSULIN ASPART 3 UNITS: 100 INJECTION, SOLUTION INTRAVENOUS; SUBCUTANEOUS at 08:07

## 2020-01-01 RX ADMIN — Medication 20 NG/KG/MIN: at 12:50

## 2020-01-01 RX ADMIN — Medication 100 MCG: at 19:57

## 2020-01-01 RX ADMIN — LORAZEPAM 2 MG: 2 INJECTION INTRAMUSCULAR; INTRAVENOUS at 14:19

## 2020-01-01 RX ADMIN — MIDAZOLAM 2 MG: 1 INJECTION INTRAMUSCULAR; INTRAVENOUS at 13:45

## 2020-01-01 RX ADMIN — DEXAMETHASONE 6 MG: 2 TABLET ORAL at 12:15

## 2020-01-01 RX ADMIN — INSULIN ASPART 4 UNITS: 100 INJECTION, SOLUTION INTRAVENOUS; SUBCUTANEOUS at 16:00

## 2020-01-01 RX ADMIN — INSULIN ASPART 1 UNITS: 100 INJECTION, SOLUTION INTRAVENOUS; SUBCUTANEOUS at 12:23

## 2020-01-01 RX ADMIN — BIVALIRUDIN 0.27 MG/KG/HR: 250 INJECTION, POWDER, LYOPHILIZED, FOR SOLUTION INTRAVENOUS at 09:21

## 2020-01-01 RX ADMIN — POLYETHYLENE GLYCOL 3350 17 G: 17 POWDER, FOR SOLUTION ORAL at 07:33

## 2020-01-01 RX ADMIN — BIVALIRUDIN 0.22 MG/KG/HR: 250 INJECTION, POWDER, LYOPHILIZED, FOR SOLUTION INTRAVENOUS at 01:51

## 2020-01-01 RX ADMIN — DOXYCYCLINE 100 MG: 100 CAPSULE ORAL at 19:36

## 2020-01-01 RX ADMIN — PIPERACILLIN SODIUM AND TAZOBACTAM SODIUM 4.5 G: 4; .5 INJECTION, POWDER, LYOPHILIZED, FOR SOLUTION INTRAVENOUS at 19:58

## 2020-01-01 RX ADMIN — DEXMEDETOMIDINE 1.2 MCG/KG/HR: 100 INJECTION, SOLUTION, CONCENTRATE INTRAVENOUS at 00:46

## 2020-01-01 RX ADMIN — INSULIN ASPART 4 UNITS: 100 INJECTION, SOLUTION INTRAVENOUS; SUBCUTANEOUS at 12:34

## 2020-01-01 RX ADMIN — LORAZEPAM 1 MG: 1 TABLET ORAL at 09:42

## 2020-01-01 RX ADMIN — DEXMEDETOMIDINE 1.2 MCG/KG/HR: 100 INJECTION, SOLUTION, CONCENTRATE INTRAVENOUS at 04:15

## 2020-01-01 RX ADMIN — FUROSEMIDE 40 MG: 10 INJECTION, SOLUTION INTRAVENOUS at 12:39

## 2020-01-01 RX ADMIN — INSULIN ASPART 5 UNITS: 100 INJECTION, SOLUTION INTRAVENOUS; SUBCUTANEOUS at 08:35

## 2020-01-01 RX ADMIN — PIPERACILLIN SODIUM AND TAZOBACTAM SODIUM 4.5 G: 4; .5 INJECTION, POWDER, LYOPHILIZED, FOR SOLUTION INTRAVENOUS at 17:42

## 2020-01-01 RX ADMIN — INSULIN ASPART 4 UNITS: 100 INJECTION, SOLUTION INTRAVENOUS; SUBCUTANEOUS at 20:37

## 2020-01-01 RX ADMIN — INSULIN ASPART 3 UNITS: 100 INJECTION, SOLUTION INTRAVENOUS; SUBCUTANEOUS at 11:43

## 2020-01-01 RX ADMIN — SODIUM CHLORIDE 250 ML: 9 INJECTION, SOLUTION INTRAVENOUS at 16:48

## 2020-01-01 RX ADMIN — Medication 1 PACKET: at 20:33

## 2020-01-01 RX ADMIN — Medication 40 MG: at 08:05

## 2020-01-01 RX ADMIN — SODIUM PHOSPHATE, MONOBASIC, MONOHYDRATE 15 MMOL: 276; 142 INJECTION, SOLUTION INTRAVENOUS at 15:11

## 2020-01-01 RX ADMIN — Medication 100 MCG/HR: at 07:35

## 2020-01-01 RX ADMIN — Medication 1 PACKET: at 20:19

## 2020-01-01 RX ADMIN — INSULIN GLARGINE 20 UNITS: 100 INJECTION, SOLUTION SUBCUTANEOUS at 22:29

## 2020-01-01 RX ADMIN — DEXMEDETOMIDINE 0.7 MCG/KG/HR: 100 INJECTION, SOLUTION, CONCENTRATE INTRAVENOUS at 10:43

## 2020-01-01 RX ADMIN — SODIUM BICARBONATE 50 MEQ: 84 INJECTION INTRAVENOUS at 20:57

## 2020-01-01 RX ADMIN — OXYCODONE HYDROCHLORIDE 10 MG: 10 TABLET ORAL at 15:57

## 2020-01-01 RX ADMIN — VANCOMYCIN HYDROCHLORIDE 1500 MG: 10 INJECTION, POWDER, LYOPHILIZED, FOR SOLUTION INTRAVENOUS at 10:00

## 2020-01-01 RX ADMIN — SODIUM CHLORIDE, POTASSIUM CHLORIDE, SODIUM LACTATE AND CALCIUM CHLORIDE 250 ML: 600; 310; 30; 20 INJECTION, SOLUTION INTRAVENOUS at 01:52

## 2020-01-01 RX ADMIN — DOCUSATE SODIUM AND SENNOSIDES 1 TABLET: 8.6; 5 TABLET ORAL at 22:14

## 2020-01-01 RX ADMIN — INSULIN ASPART 2 UNITS: 100 INJECTION, SOLUTION INTRAVENOUS; SUBCUTANEOUS at 15:35

## 2020-01-01 RX ADMIN — Medication 20 NG/KG/MIN: at 00:29

## 2020-01-01 RX ADMIN — METOPROLOL TARTRATE 5 MG: 5 INJECTION INTRAVENOUS at 10:57

## 2020-01-01 RX ADMIN — INSULIN ASPART 3 UNITS: 100 INJECTION, SOLUTION INTRAVENOUS; SUBCUTANEOUS at 16:15

## 2020-01-01 RX ADMIN — POLYETHYLENE GLYCOL 3350 17 G: 17 POWDER, FOR SOLUTION ORAL at 19:39

## 2020-01-01 RX ADMIN — INSULIN ASPART 3 UNITS: 100 INJECTION, SOLUTION INTRAVENOUS; SUBCUTANEOUS at 00:25

## 2020-01-01 RX ADMIN — METOLAZONE 5 MG: 5 TABLET ORAL at 12:25

## 2020-01-01 RX ADMIN — ASPIRIN 162 MG: 81 TABLET, CHEWABLE ORAL at 00:25

## 2020-01-01 RX ADMIN — DOCUSATE SODIUM AND SENNOSIDES 1 TABLET: 8.6; 5 TABLET ORAL at 22:49

## 2020-01-01 RX ADMIN — POTASSIUM CHLORIDE 20 MEQ: 1.5 POWDER, FOR SOLUTION ORAL at 16:05

## 2020-01-01 RX ADMIN — DIPYRIDAMOLE 25 MG: 25 TABLET, FILM COATED ORAL at 13:46

## 2020-01-01 RX ADMIN — Medication 37.5 MG: at 11:04

## 2020-01-01 RX ADMIN — INSULIN ASPART 3 UNITS: 100 INJECTION, SOLUTION INTRAVENOUS; SUBCUTANEOUS at 17:27

## 2020-01-01 RX ADMIN — THIAMINE HCL TAB 100 MG 100 MG: 100 TAB at 07:59

## 2020-01-01 RX ADMIN — Medication 100 MCG/HR: at 00:28

## 2020-01-01 RX ADMIN — Medication 10 NG/KG/MIN: at 06:29

## 2020-01-01 RX ADMIN — OXYCODONE HYDROCHLORIDE 10 MG: 10 TABLET ORAL at 23:33

## 2020-01-01 RX ADMIN — IOPAMIDOL 123 ML: 755 INJECTION, SOLUTION INTRAVENOUS at 12:09

## 2020-01-01 RX ADMIN — Medication 50 MCG/HR: at 18:45

## 2020-01-01 RX ADMIN — INSULIN ASPART 5 UNITS: 100 INJECTION, SOLUTION INTRAVENOUS; SUBCUTANEOUS at 20:01

## 2020-01-01 RX ADMIN — SODIUM CHLORIDE, PRESERVATIVE FREE 90 ML: 5 INJECTION INTRAVENOUS at 01:22

## 2020-01-01 ASSESSMENT — ACTIVITIES OF DAILY LIVING (ADL)
ADLS_ACUITY_SCORE: 26
ADLS_ACUITY_SCORE: 22
ADLS_ACUITY_SCORE: 22
ADLS_ACUITY_SCORE: 19
ADLS_ACUITY_SCORE: 22
ADLS_ACUITY_SCORE: 24
ADLS_ACUITY_SCORE: 26
ADLS_ACUITY_SCORE: 23
ADLS_ACUITY_SCORE: 24
ADLS_ACUITY_SCORE: 22
ADLS_ACUITY_SCORE: 22
ADLS_ACUITY_SCORE: 24
ADLS_ACUITY_SCORE: 24
ADLS_ACUITY_SCORE: 21
ADLS_ACUITY_SCORE: 22
ADLS_ACUITY_SCORE: 21
ADLS_ACUITY_SCORE: 24
ADLS_ACUITY_SCORE: 22
ADLS_ACUITY_SCORE: 24
ADLS_ACUITY_SCORE: 22
ADLS_ACUITY_SCORE: 23
ADLS_ACUITY_SCORE: 22
ADLS_ACUITY_SCORE: 21
ADLS_ACUITY_SCORE: 21
ADLS_ACUITY_SCORE: 22
ADLS_ACUITY_SCORE: 26
ADLS_ACUITY_SCORE: 21
ADLS_ACUITY_SCORE: 22
ADLS_ACUITY_SCORE: 21
ADLS_ACUITY_SCORE: 26
ADLS_ACUITY_SCORE: 24
ADLS_ACUITY_SCORE: 24
ADLS_ACUITY_SCORE: 21
ADLS_ACUITY_SCORE: 22
ADLS_ACUITY_SCORE: 23
ADLS_ACUITY_SCORE: 23
ADLS_ACUITY_SCORE: 22
ADLS_ACUITY_SCORE: 22
ADLS_ACUITY_SCORE: 19
ADLS_ACUITY_SCORE: 21
ADLS_ACUITY_SCORE: 23
PREVIOUS_RESPONSIBILITIES: MEAL PREP;HOUSEKEEPING;LAUNDRY;SHOPPING;YARDWORK;MEDICATION MANAGEMENT;FINANCES;DRIVING
ADLS_ACUITY_SCORE: 24
ADLS_ACUITY_SCORE: 26
ADLS_ACUITY_SCORE: 22
ADLS_ACUITY_SCORE: 26
ADLS_ACUITY_SCORE: 22
ADLS_ACUITY_SCORE: 18
ADLS_ACUITY_SCORE: 19
ADLS_ACUITY_SCORE: 22
ADLS_ACUITY_SCORE: 26
ADLS_ACUITY_SCORE: 24
ADLS_ACUITY_SCORE: 24
ADLS_ACUITY_SCORE: 26
ADLS_ACUITY_SCORE: 18
ADLS_ACUITY_SCORE: 24
ADLS_ACUITY_SCORE: 22
ADLS_ACUITY_SCORE: 22
ADLS_ACUITY_SCORE: 26
ADLS_ACUITY_SCORE: 22
ADLS_ACUITY_SCORE: 24
ADLS_ACUITY_SCORE: 21
ADLS_ACUITY_SCORE: 26
ADLS_ACUITY_SCORE: 23
ADLS_ACUITY_SCORE: 22
ADLS_ACUITY_SCORE: 24
ADLS_ACUITY_SCORE: 22
ADLS_ACUITY_SCORE: 24
ADLS_ACUITY_SCORE: 22
ADLS_ACUITY_SCORE: 24
ADLS_ACUITY_SCORE: 23
ADLS_ACUITY_SCORE: 26
ADLS_ACUITY_SCORE: 22
ADLS_ACUITY_SCORE: 22
ADLS_ACUITY_SCORE: 24
ADLS_ACUITY_SCORE: 22
ADLS_ACUITY_SCORE: 21
ADLS_ACUITY_SCORE: 24
ADLS_ACUITY_SCORE: 26

## 2020-01-01 ASSESSMENT — MIFFLIN-ST. JEOR
SCORE: 1634.25
SCORE: 1705.25
SCORE: 1711.25
SCORE: 1880.58
SCORE: 1717.25
SCORE: 1714.25
SCORE: 1726.25
SCORE: 1690.25
SCORE: 1703.25
SCORE: 1710.25
SCORE: 1716.25
SCORE: 1813.25
SCORE: 1828.25
SCORE: 1704.25
SCORE: 1863.33
SCORE: 1692.25
SCORE: 1737.25

## 2020-01-01 ASSESSMENT — PAIN SCALES - GENERAL
PAINLEVEL: NO PAIN (0)

## 2020-01-01 ASSESSMENT — VISUAL ACUITY
OU: NOT TESTABLE
OU: NOT TESTABLE

## 2020-01-08 NOTE — PROGRESS NOTES
ANTICOAGULATION FOLLOW-UP CLINIC VISIT    Patient Name:  Danielito Chu  Date:  1/8/2020  Contact Type:  Telephone    SUBJECTIVE:  Patient Findings     Positives:   Change in medications (started on amiodarone 12/9/19)    Comments:   Spoke with Fabiano.  He reports no change in health, diet.  He is feeling good/sleeping good.  No signs of bleeding.   Fabiano started amiodarone in December.  Decreased warfarin dose and he will recheck INR 1/13/2020.          Clinical Outcomes     Comments:   Spoke with Fabiano.  He reports no change in health, diet.  He is feeling good/sleeping good.  No signs of bleeding.   Fabiano started amiodarone in December.  Decreased warfarin dose and he will recheck INR 1/13/2020.             OBJECTIVE    INR   Date Value Ref Range Status   01/08/2020 3.5 (A) 0.90 - 1.10 Final     Factor 2 Assay   Date Value Ref Range Status   10/27/2018 70 60 - 140 % Final     Comment:     The Factor 2 activity level is not a screening test for the Prothrombin 55060   mutation.         ASSESSMENT / PLAN  INR assessment SUPRA    Recheck INR In: 5 DAYS    INR Location Outside lab      Anticoagulation Summary  As of 1/8/2020    INR goal:   2.0-3.0   TTR:   81.4 % (1 y)   INR used for dosing:   3.5! (1/8/2020)   Warfarin maintenance plan:   No maintenance plan   Full warfarin instructions:   1/8: 1 mg; 1/9: 2 mg; 1/10: 2 mg; 1/11: 2 mg; 1/12: 2 mg   Plan last modified:   Fady Avelar RN (12/31/2019)   Next INR check:   1/13/2020   Priority:   Critical   Target end date:   Indefinite    Indications    LVAD (left ventricular assist device) present (H) [Z95.811]  Long term (current) use of anticoagulants [Z79.01]             Anticoagulation Episode Summary     INR check location:       Preferred lab:       Send INR reminders to:   [facility]    Comments:   [place] 4/27-5/8 [facility] [phone]/Fax [phone]  LVAD plced 12/5/18 HM 3 ASA 81mg Daily Jantoven 2mg tabs Lab  [phone]  Call pt at [phone] Emphasize next INR date with pt++Send email each time++  Pt is home .      Anticoagulation Care Providers     Provider Role Specialty Phone number    Lorena Watkins MD Referring Cardiology [phone]            See the Encounter Report to view Anticoagulation Flowsheet and Dosing Calendar (Go to Encounters tab in chart review, and find the Anticoagulation Therapy Visit)    Spoke with Fabiano.  See patient findings.  Discussed with Pharmacist Chandra Ordonez [facility] for patient's new Anticoagulation recommendation.      Patient had LVAD placed on:   12/5/18  Type of LVAD: HM 3  Patient's current Aspirin dose: 81 mg daily  LVAD Protocol followed: Yes       Jeanette Bryant RN

## 2020-01-13 NOTE — PROGRESS NOTES
1/13/2020:  Spoke with Fabiano.  He will get an INR check tomorrow, 1/14/2020.  He will take warfarin 3 mg tonight.  He continues on amiodarone, but down to 200 mg daily.  Jeanette Bryant RN

## 2020-01-16 NOTE — TELEPHONE ENCOUNTER
LISINOPRIL 5 MG TABLET    Last Written Prescription Date:  12/19/109  Last Fill Quantity: 60,   # refills: 1  Last Office Visit : 12/6/19  Future Office visit:  2/20/20    Routing refill request to provider for review/approval because:  Drug not active on patient's medication list  Was medication discontinued on 12/27/19?  Thank-you, Loretta KWNO RN Medication Refill Team

## 2020-01-20 NOTE — PROGRESS NOTES
ANTICOAGULATION FOLLOW-UP CLINIC VISIT    Patient Name:  Danielito Chu  Date:  1/20/2020  Contact Type:  Telephone    SUBJECTIVE:  Patient Findings     Positives:   Change in medications (Pt is on amiodarone)             OBJECTIVE    INR   Date Value Ref Range Status   01/14/2020 3.0 (A) 0.90 - 1.10 Final     Factor 2 Assay   Date Value Ref Range Status   10/27/2018 70 60 - 140 % Final     Comment:     The Factor 2 activity level is not a screening test for the Prothrombin 03461   mutation.         ASSESSMENT / PLAN  INR assessment THER    Recheck INR In: 2 WEEKS    INR Location Outside lab      Anticoagulation Summary  As of 1/20/2020    INR goal:   2.0-3.0   TTR:   79.5 % (12 mo)   INR used for dosing:   3.0 (1/14/2020)   Warfarin maintenance plan:   No maintenance plan   Full warfarin instructions:   1/20: 2 mg; 1/21: 2 mg; 1/22: 2 mg; 1/23: 2 mg; 1/24: 2 mg; 1/25: 2 mg; 1/26: 2 mg; 1/27: 2 mg   Plan last modified:   Fady Avelar RN (12/31/2019)   Next INR check:   1/28/2020   Priority:   Critical   Target end date:   Indefinite    Indications    LVAD (left ventricular assist device) present (H) [Z95.811]  Long term (current) use of anticoagulants [Z79.01]             Anticoagulation Episode Summary     INR check location:       Preferred lab:       Send INR reminders to:    [facility]    Comments:   [place] 4/27-5/8 [facility] [phone]/Fax [phone]  LVAD plced 12/5/18 HM 3 ASA 81mg Daily Jantoven 2mg tabs Lab [phone]  Call pt at [phone] Emphasize next INR date with pt++Send email each time++  Pt is home .      Anticoagulation Care Providers     Provider Role Specialty Phone number    Lorena Watkins MD Referring Cardiology [phone]            See the Encounter Report to view Anticoagulation Flowsheet and Dosing Calendar (Go to Encounters tab in chart review, and find the Anticoagulation Therapy Visit)  Spoke with patient.  Patient had  LVAD placed on:   12/5/18  Type of LVAD: HM3  Patient's current Aspirin dose: 81mg  LVAD Protocol followed:  Yes  Lorena Kamara RN

## 2020-02-11 NOTE — LETTER
2020  Danielito Chu  : 1955  ICD10:  I50    Subject: Request for Additional Testing    Danielito Chu is a potential heart transplant recipient currently being followed by the [facility].  We would like to request your assistance in obtaining the following tests and/or procedures in your local community to assist in the care of our mutual patient:     PRA Antibody Test-as requested by the transplant center (patient will bring kits to the lab)-please follow directions enclosed in the kit for processing back to [facility]    Please fax results as soon as they are available to:   Thoracic Transplant Department  Fax Number:  [phone]    Thank you  for your continued support and the opportunity to collaborate in the care of this patient.  If you have any questions, please call Ashley Domingo, Transplant Coordinator,  at [phone] (option 2) or [phone].      Lorena Watkins M.D.  Division of Cardiology   of Medicine    Ashley Domingo RN, BSN  Heart Transplant Coordinator  [facility]

## 2020-02-11 NOTE — TELEPHONE ENCOUNTER
Pt received his kit an the mail  Went to get the labs done but [facility] did not have any orders for his antibody test

## 2020-02-11 NOTE — PROGRESS NOTES
ANTICOAGULATION FOLLOW-UP CLINIC VISIT    Patient Name:  Danielito Chu  Date:  2/11/2020  Contact Type:  Telephone    SUBJECTIVE:  Patient Findings     Comments:   Pt wants to continue taking 3mg M and 2mg ROW. Pt reports he will get another INR in two weeks         Clinical Outcomes     Negatives:   Major bleeding event, Thromboembolic event, Anticoagulation-related hospital admission, Anticoagulation-related ED visit, Anticoagulation-related fatality    Comments:   Pt wants to continue taking 3mg M and 2mg ROW. Pt reports he will get another INR in two weeks            OBJECTIVE    INR   Date Value Ref Range Status   02/11/2020 3.1 (A) 0.90 - 1.10 Final     Comment:     Outside Lab      Factor 2 Assay   Date Value Ref Range Status   10/27/2018 70 60 - 140 % Final     Comment:     The Factor 2 activity level is not a screening test for the Prothrombin 88564   mutation.         ASSESSMENT / PLAN  INR assessment THER    Recheck INR In: 2 WEEKS    INR Location Outside lab      Anticoagulation Summary  As of 2/11/2020    INR goal:   2.0-3.0   TTR:   82.0 % (1 y)   INR used for dosing:   3.1! (2/11/2020)   Warfarin maintenance plan:   3 mg (2 mg x 1.5) every Mon; 2 mg (2 mg x 1) all other days   Full warfarin instructions:   3 mg every Mon; 2 mg all other days   Weekly warfarin total:   15 mg   Plan last modified:   Kristie Fernandez RN (2/11/2020)   Next INR check:   2/25/2020   Priority:   Critical   Target end date:   Indefinite    Indications    LVAD (left ventricular assist device) present (H) [Z95.811]  Long term (current) use of anticoagulants [Z79.01]             Anticoagulation Episode Summary     INR check location:       Preferred lab:       Send INR reminders to:   [facility]    Comments:   [place] 4/27-5/8 [facility] [phone]/Fax [phone]  LVAD plced 12/5/18 HM 3 ASA 81mg Daily Jantoven 2mg tabs Lab [phone]  Call pt at [phone] Emphasize next INR  date with pt++Send email each time++  Pt is home .      Anticoagulation Care Providers     Provider Role Specialty Phone number    Lorena Watkins MD Referring Cardiology [phone]            See the Encounter Report to view Anticoagulation Flowsheet and Dosing Calendar (Go to Encounters tab in chart review, and find the Anticoagulation Therapy Visit)    Spoke with patient. Gave them their lab results and new warfarin recommendation.  No changes in health, medication, or diet. No missed doses, no falls. No signs or symptoms of bleed or clotting.     Patient had LVAD placed on:   12/5/18  Type of LVAD: HM 3  Patient's current Aspirin dose: ASA 81mg Daily  LVAD Protocol followed: Yes   If Not Followed Explanation:  N/A    Kristie Fernandez RN

## 2020-02-11 NOTE — TELEPHONE ENCOUNTER
Spoke to patient, explained the process for sending in PRA kits.  Orders are in the kit that was sent, additional orders also sent to the lab today.  Patient verbalized understanding of the plan and agrees to call Transplant Coordinator with any further questions or concerns.

## 2020-02-25 NOTE — TELEPHONE ENCOUNTER
colchicine (COLCYRS) 0.6 MG tablet      Last Written Prescription Date:  7/11/19  Last Fill Quantity: 180,   # refills: 1  Last Office Visit : 12/26/18  Future Office visit:  none  Pended.     Routing refill request to provider for review/approval because:  Overdue office visit &  uric acid    Recent Labs   Lab Test 12/26/18  1325   URIC 4.5

## 2020-02-25 NOTE — PROGRESS NOTES
ANTICOAGULATION FOLLOW-UP CLINIC VISIT    Patient Name:  Danielito Chu  Date:  2/25/2020  Contact Type:  Telephone    SUBJECTIVE:  Patient Findings     Comments:   Patient  Has had diarrhea for 6 weeks. Patient is going to slowly stop his trazadone.        Clinical Outcomes     Comments:   Patient  Has had diarrhea for 6 weeks. Patient is going to slowly stop his trazadone.           OBJECTIVE    INR   Date Value Ref Range Status   02/25/2020 3.1 (A) 0.90 - 1.10 Final     Factor 2 Assay   Date Value Ref Range Status   10/27/2018 70 60 - 140 % Final     Comment:     The Factor 2 activity level is not a screening test for the Prothrombin 10218   mutation.         ASSESSMENT / PLAN  INR assessment SUPRA    Recheck INR In: 2 WEEKS    INR Location Outside lab      Anticoagulation Summary  As of 2/25/2020    INR goal:   2.0-3.0   TTR:   79.6 % (1 y)   INR used for dosing:   3.1! (2/25/2020)   Warfarin maintenance plan:   3 mg (2 mg x 1.5) every Mon; 2 mg (2 mg x 1) all other days   Full warfarin instructions:   3 mg every Mon; 2 mg all other days   Weekly warfarin total:   15 mg   Plan last modified:   Kristie Fernandez RN (2/11/2020)   Next INR check:   3/10/2020   Priority:   Critical   Target end date:   Indefinite    Indications    LVAD (left ventricular assist device) present (H) [Z95.811]  Long term (current) use of anticoagulants [Z79.01]             Anticoagulation Episode Summary     INR check location:       Preferred lab:       Send INR reminders to:   [facility]    Comments:   [place] 4/27-5/8 [facility] [phone]/Fax [phone]  LVAD plced 12/5/18 HM 3 ASA 81mg Daily Jantoven 2mg tabs Lab [phone]  Call pt at [phone] Emphasize next INR date with pt++Send email each time++  Pt is home .      Anticoagulation Care Providers     Provider Role Specialty Phone number    Lorena Watkins MD Referring Cardiology [phone]            See the Encounter  Report to view Anticoagulation Flowsheet and Dosing Calendar (Go to Encounters tab in chart review, and find the Anticoagulation Therapy Visit)    Spoke with Fabiano. He is coming for an appointment on 3/4/20 and may get labs then.  Patient had LVAD placed on:   12/5/18  Type of LVAD: Heart mate 3  Patient's current Aspirin dose: 81mgs daily.  LVAD Protocol followed: Yes.       Giovanni Ordonez RPH

## 2020-02-26 NOTE — TELEPHONE ENCOUNTER
colchicine (COLCYRS) 0.6 MG tablet      allopurinol (ZYLOPRIM) 100 MG tablet  Last Written Prescription Date:  7/11/19  Last Fill Quantity: 180,   # refills: 1  Last Office Visit : 12/26/18  Future Office visit: 7/16/2020      Routing refill request to provider for review/approval because:  Overdue office visit &  uric acid         Recent Labs   Lab Test 12/26/18  1325   URIC 4.5

## 2020-02-26 NOTE — TELEPHONE ENCOUNTER
[facility] Call Center    Phone Message    May a detailed message be left on voicemail: yes     Reason for Call: Medication Refill Request    Has the patient contacted the pharmacy for the refill? Yes   Name of medication being requested: colchicine (COLCYRS) 0.6 MG tablet  Provider who prescribed the medication: Dr Nassar   Pharmacy: [facility] #[id number] [address]  Date medication is needed: as soon as possible pt was told he needed to schedule an appointment first the appointment was made and he can nnot be seen until July 2020 needs a refill to get him by until appointment          Action Taken: Message routed to:  [facility] rheumatology    Travel Screening: Not Applicable

## 2020-02-27 NOTE — TELEPHONE ENCOUNTER
Called ivonne and asked him how much colchicine he was taking daily, he said he has not had a flare recently and that he has been taking two lately a day. I told him to cut down to one a day and that we will make sure to have his uric acid checked next lab appt which will be 3/4.    Monet Junior, SIMONEN RN   Rheumatology Clinic Nurse    [place]

## 2020-03-04 PROBLEM — Z76.82 AWAITING ORGAN TRANSPLANT: Status: ACTIVE | Noted: 2020-01-01

## 2020-03-04 PROBLEM — I50.22 CHRONIC SYSTOLIC CONGESTIVE HEART FAILURE (H): Status: ACTIVE | Noted: 2020-01-01

## 2020-03-04 NOTE — PROGRESS NOTES
ANTICOAGULATION FOLLOW-UP CLINIC VISIT    Patient Name:  Danielito Chu  Date:  3/4/2020  Contact Type:  Telephone    SUBJECTIVE:         OBJECTIVE    INR   Date Value Ref Range Status   2020 2.81 (H) 0.86 - 1.14 Final     Factor 2 Assay   Date Value Ref Range Status   10/27/2018 70 60 - 140 % Final     Comment:     The Factor 2 activity level is not a screening test for the Prothrombin 62635   mutation.         ASSESSMENT / PLAN  No question data found.  Anticoagulation Summary  As of 3/4/2020    INR goal:   2.0-3.0   TTR:   80.0 % (1 y)   INR used for dosin.81 (3/4/2020)   Warfarin maintenance plan:   3 mg (2 mg x 1.5) every Mon; 2 mg (2 mg x 1) all other days   Full warfarin instructions:   3 mg every Mon; 2 mg all other days   Weekly warfarin total:   15 mg   No change documented:   rBi Becerril RN   Plan last modified:   Kristie Fernandez RN (2020)   Next INR check:   3/18/2020   Priority:   Critical   Target end date:   Indefinite    Indications    LVAD (left ventricular assist device) present (H) [Z95.811]  Long term (current) use of anticoagulants [Z79.01]             Anticoagulation Episode Summary     INR check location:       Preferred lab:       Send INR reminders to:   [facility]    Comments:   [place] [facility] [phone]/Fax [phone]  LVAD plced 18 HM 3 ASA 81mg Daily Jantoven 2mg tabs Lab [phone]  Call pt at [phone] Emphasize next INR date with pt++Send email each time++  Pt is home .      Anticoagulation Care Providers     Provider Role Specialty Phone number    Lorena Watkins MD Referring Cardiology [phone]            See the Encounter Report to view Anticoagulation Flowsheet and Dosing Calendar (Go to Encounters tab in chart review, and find the Anticoagulation Therapy Visit)    Spoke with patient.     Bri Becerril RN

## 2020-03-04 NOTE — NURSING NOTE
1). PUMP DATA  Primary controller serial number: [id number]    HM 3:   Flow:  5.0L/min,    Speed: 5500 RPMs,     PI: 3.0 ,  Power: 4.6 [place], Hct: 47.2     Primary controller   Back up battery: Patient use: 13, Replace in: 15  Months     Data downloaded: No   Equipment and driveline assessed for damage: Yes     Back up : Serial number: [id number]  Back up battery: Patient use: 11 Replace in: 14  Months  Programmed settings identical to the settings on the primary controller : N/A      Education complete: Yes   Charge the BACKUP controller s backup battery every 6 months  Perform a self test on BACKUP every 6 monhs  Change the MPU s batteries every 6 months:Yes    2). ALARMS  Alarms reported by patient since last pump evaluation: No  Alarms or other finding noted during pump data history and alarm download: Yes, some PI events with 3 speed drops to 5350. PI 2.4 to 6.0    Action Taken:  Reviewed data with patient: Yes      3). DRESSING CHANGE / DRIVELINE ASSESSMENT  Dressing change completed today: No  Appearance of Driveline site: weekly dsng, no drainage, no redness.    Driveline stabilization: Method: [employer]  Teaching reinforced on need for stabilization of Driveline.    4).Pt. Education  D:  Pt education provided.  I:  Educated on MyChart  1.  How to message VAD Coordinator (send to MD but address to VAD Coordinator)  2. How to send photo via My Chart  3. When to use MyChart vs Call VAD Coordinator on Call.    A:  Pt verbalized understanding.  Pt is already signed up for MY Chart.    P:  VAD Coordinator available for questions or concerns.  Will continue VAD education.

## 2020-03-04 NOTE — PATIENT INSTRUCTIONS
It was a pleasure to see you in clinic today.  Please do not hesitate to call with any questions or concerns.  You are scheduled for a remote transmission on 6/4/20.  We look forward to seeing you in clinic at your next device check in 6 months.    Theodore Bahena, RN  Electrophysiology Nurse Clinician  [facility]    During Business Hours Please Call:  [phone]  After Hours Please Call:  [phone] - select option #4 and ask for job code [phone]

## 2020-03-04 NOTE — LETTER
3/4/2020      RE: Danielito Chu  [address] [place] [address]       HPI:   Mr. Danielito Chu is a 63 yr old male with a history of NICM (LVEF at dong of 15%) s/p ICD (4/2017),  who underwent HM3 LVAD implant on 12/5/18 as DT (some history of mariajuana smoking, liver disease).     Last visit: 12/6/2019 with Dr. Watkins  He had atrial tachycardia. It was happening often. He was started on amiodarone and plan was for an ICD check 1 week later. The arrythmias were greatly improved after started amiodarone.    This visit  He is not feeling his best today.  He did not sleep well last night because he was up late watching the primary election coverage.  Was unable to sleep much once he went to sleep and now has a mild headache.  He attributes this to the poor sleep.  He also has little bit of left ear pressure today.  No facial drooping per patient, slurred speech, confusion, weakness on one side of his body.  His INRs most recently have been normal.    He denies any shortness of breath at rest.  He can walk three quarters of a mile before he feels short of breath.  This is about the same as last year.  He sleeps on 2 pillows, no orthopnea, no PND.  No lower extremity edema.  He does feel like he has little bit of bloating in his abdomen but this feels related to gas.  He does have a little bit of dizziness with very quick position changes.  He had had palpitations in the past but it is been 8 weeks since this happened.  No chest pain.  His appetite is stable but he is working very hard to control his weight so that he can stay transplant eligible.  He denies any blood in the urine or blood in the stool.  He denies symptoms of a driveline infection.  He denies stroke symptoms.  He does have headaches as above.  No concerning neuro symptoms.    Cardiac Medications  ASA 81 mg daily  Coumadin  Amiodarone 200 mg daily  Bumex 2 mg BID  KCl 20 BID  Losartan 25 mg daily  Metoprolol succinate 25 mg  daily  Spironolactone 37.5 mg daily    PAST MEDICAL HISTORY:  Past Medical History:   Diagnosis Date     Acute systolic congestive heart failure (H) 2017     Diabetes (H)      HTN (hypertension)      Hyperlipidemia      Liver disease      LVAD (left ventricular assist device) present (H)      3      Marijuana abuse      Mitral regurgitation      Nocturnal oxygen desaturation 3/29/2018     Nonischemic cardiomyopathy (H) 2017     SHIRLEY (obstructive sleep apnea)      Pacemaker 2017    ICD 17     Situational mixed anxiety and depressive disorder        FAMILY HISTORY:  Family History   Problem Relation Age of Onset     Heart Failure Father          at age 86     Heart Failure Paternal Uncle          at 66      Myocardial Infarction Paternal Uncle          at age 62     Coronary Artery Disease Mother          during CABG at age 41       SOCIAL HISTORY:  Social History     Socioeconomic History     Marital status: Single     Spouse name: Not on file     Number of children: Not on file     Years of education: Not on file     Highest education level: Not on file   Occupational History     Not on file   Social Needs     Financial resource strain: Not on file     Food insecurity:     Worry: Not on file     Inability: Not on file     Transportation needs:     Medical: Not on file     Non-medical: Not on file   Tobacco Use     Smoking status: Former Smoker     Packs/day: 0.30     Years: 20.00     Pack years: 6.00     Types: Cigarettes     Smokeless tobacco: Never Used     Tobacco comment: quit 3/2017   Substance and Sexual Activity     Alcohol use: No     Frequency: Never     Comment: rare     Drug use: No     Sexual activity: Not on file   Lifestyle     Physical activity:     Days per week: Not on file     Minutes per session: Not on file     Stress: Not on file   Relationships     Social connections:     Talks on phone: Not on file     Gets together: Not on file     Attends Catholic  service: Not on file     Active member of club or organization: Not on file     Attends meetings of clubs or organizations: Not on file     Relationship status: Not on file     Intimate partner violence:     Fear of current or ex partner: Not on file     Emotionally abused: Not on file     Physically abused: Not on file     Forced sexual activity: Not on file   Other Topics Concern     Parent/sibling w/ CABG, MI or angioplasty before 65F 55M? Not Asked   Social History Narrative     Not on file       CURRENT MEDICATIONS:  allopurinol (ZYLOPRIM) 100 MG tablet, Take 2 tablets (200 mg) by mouth daily  amiodarone 200 MG PO tablet, Take 1 tablet (200 mg) by mouth daily  aspirin (ASA) 81 MG chewable tablet, Take 1 tablet (81 mg) by mouth daily  Blood Glucose Monitoring Suppl (BLOOD GLUCOSE MONITOR SYSTEM) w/Device KIT, three times a week  bumetanide (BUMEX) 1 MG tablet, Take 2 tablets (2 mg) by mouth 2 times daily  colchicine (COLCRYS) 0.6 MG tablet, Take 1 tablet (0.6 mg) by mouth daily  losartan (COZAAR) 25 MG tablet, Take 1 tablet (25 mg) by mouth daily  potassium chloride ER (K-DUR/KLOR-CON M) 10 MEQ CR tablet, Take 2 tablets (20 mEq) by mouth 2 times daily  spironolactone (ALDACTONE) 25 MG tablet, Take 1.5 tablets (37.5 mg) by mouth daily  warfarin (COUMADIN) 2 MG tablet, Take 3 mg PO daily at 6 pm until next INR check, then dose per INR goal 2-3  amoxicillin (AMOXIL) 500 MG capsule, Take 4 capsules (2,000 mg) by mouth once as needed (Take 4 capsules (2000mg), one hour before any dental cleanings or procedures) (Patient not taking: Reported on 9/10/2019)  enoxaparin (LOVENOX) 100 MG/ML syringe, Inject 100 mg (1 ml) every 12 hours as directed by the Anticoagulation Clinic. (Patient not taking: Reported on 9/10/2019)  tacrolimus (PROTOPIC) 0.1 % external ointment, Apply topically 2 times daily (Patient not taking: Reported on 3/4/2020)  traZODone (DESYREL) 100 MG tablet, TAKE 1 TABLET BY MOUTH EVERY DAY AT BEDTIME AS  "NEEDED FOR SLEEP (Patient not taking: Reported on 3/4/2020)  vitamin B1 (THIAMINE) 100 MG tablet, Take 1 tablet (100 mg) by mouth daily (Patient not taking: Reported on 9/10/2019)    No current facility-administered medications on file prior to visit.       ROS:   CONSTITUTIONAL: Denies fever, chills,  or weight fluctuations.   HEENT: Denies vision changes and changes in speech.   CV: Refer to HPI.   PULMONARY:Refer to HPI.   GI:Denies nausea, vomiting, diarrhea, and abdominal pain. Bowel movements are regular.   :Denies urinary alterations, dysuria, urinary frequency, hematuria, and abnormal drainage.   EXT: See HPI  SKIN:Denies abnormal rashes or lesions.   MUSCULOSKELETAL:Denies upper or lower extremity weakness and pain.   NEUROLOGIC:Denies seizures, or upper or lower extremity paresthesia.     EXAM:  BP (!) 80/0 (BP Location: Right arm, Patient Position: Chair, Cuff Size: Adult Regular)   Ht 1.765 m (5' 9.5\")   Wt 108.7 kg (239 lb 11.2 oz)   BMI 34.89 kg/m      GENERAL: Appears comfortable, in no distress speaking in full sentences and able to communicate all needs.  HEENT: Eye symmetrical and without discharge or icterus bilaterally. Mucous membranes moist and without lesions.  NECK: Supple, JVD 2 cm above clavicle at 60 degrees.   CV: Hum of LVAD, no adventitious sounds  RESPIRATORY: Respirations regular, even, and unlabored. Lungs CTA throughout.   GI: Soft and non distended with normoactive bowel sounds present in all quadrants. No tenderness, rebound, guarding. No organomegaly.   EXTREMITIES: No peripheral edema.  Non-pulsatile.   NEUROLOGIC: Alert and interacting appropriately.  Cranial nerves II through XII intact.  5 out of 5 upper and lower extremity strength.  No pronator drift.  Good rapid alternating movements.  Gait observed and was steady.  No focal deficits.   MUSCULOSKELETAL: No joint swelling or tenderness.   SKIN: No jaundice. No rashes or lesions. Driveline dressing clean dry " intact.    Labs - as reviewed in clinic with patient today:  CBC RESULTS:  Lab Results   Component Value Date    WBC 11.0 03/04/2020    RBC 4.84 03/04/2020    HGB 16.2 03/04/2020    HCT 47.2 03/04/2020    MCV 98 03/04/2020    MCH 33.5 (H) 03/04/2020    MCHC 34.3 03/04/2020    RDW 14.0 03/04/2020     03/04/2020       CMP RESULTS:  Lab Results   Component Value Date     03/04/2020    POTASSIUM 3.7 03/04/2020    CHLORIDE 99 03/04/2020    CO2 29 03/04/2020    ANIONGAP 6 03/04/2020     (H) 03/04/2020    BUN 13 03/04/2020    CR 1.26 (H) 03/04/2020    GFRESTIMATED 60 (L) 03/04/2020    GFRESTBLACK 69 03/04/2020    ASHLEE 9.2 03/04/2020    BILITOTAL 1.0 03/04/2020    ALBUMIN 4.0 03/04/2020    ALKPHOS 100 03/04/2020    ALT 53 03/04/2020    AST 38 03/04/2020        INR RESULTS:  Lab Results   Component Value Date    INR 2.81 (H) 03/04/2020       Lab Results   Component Value Date    MAG 2.1 12/13/2018     Lab Results   Component Value Date    NTBNPI 1,970 (H) 10/27/2018     Lab Results   Component Value Date    NTBNP 2,244 (H) 12/20/2018       Assessment and Plan:   Mr. Danielito Chu is a 63 yr old male with a history of NICM (LVEF at dong of 15%) s/p ICD (4/2017),  who underwent HM3 LVAD implant on 12/5/18 as DT (some history of mariajuana smoking, liver disease). He presents today for LVAD follow-up. He appears slightly hypovolemic, which appears to be secondary to not drinking a lot of water as he is worried about his weight.  Otherwise he is generally well-appearing today.    He has a headache which appears to be due to sleep deprivation last night.  No concerning neuro symptoms.  Discussed risks and benefits with the patient and we decided to defer a head CT today.    He did have some ventricular arrhythmias on his ICD check.  In reaching out to the device clinic to see how long these lasted.  We will increase his metoprolol as below.    # Chronic systolic heart failure secondary to NICM s/p 3 LVAD as  BTT on 12/5/2018, listed as status 4.  Stage D. NYHA Class II.    Fluid status hypovolemic-near euvolemic, reported is drinking less water to prepare for his weight today.  Encouraged to drink a little more water today.  ACEi/ARB Losartan 25 mg daily  BB Metoprolol 25 mg daily  Aldosterone antagonist Spironolatone 37.5 mg daily  SCD prophylaxis ICD  NSAID use: Contraindicated  Sleep Apnea Evaluation: Did not discuss in clinic today.  BP: Map is 80, goal is 65-85  LDH trends: 231, stable  Anticoagulation: warfarin INR goal 2-3 . 2.81 today  Antiplatelet: Aspirin 81 mg    VAD interrogation today: VAD interrogation reviewed with VAD coordinator. Agree with findings.  Occasional PI events 2 speed drops. no power spikes  or other findings suspicious of pump malfunction noted.     # Hypertension: Controlled.  Continue metoprolol XL and losartan.     # NSVT: Increase Metoprolol as outlined above.    # Obesity BMI of 34.9 today.  He is successfully kept his weight under the threshold for transplant listing, but he is having difficulty losing any more weight and this is his goal.  -Refer back to the dietitian.    #CKD stage II  - Cr slightly up today, has not been drinking much water to prepare for wt check today. Recheck BMP next week when he is drinking more regularly.    Follow up : BMP next week (instructed to drink more water, Cr was slightly up today and he had been avoiding water given weight check).  Dr. Watkins in 3 months with a right heart cath.  NP in 6 months.    *** Note done to here, but you did not sign encounter because you need to [facility] device team re: time of vent arrythmias        40 minutes spent face-to-face with patient, >50% in counseling and/or coordination of care as described above      PITER Oscar  3/4/2020          CC  SELF, REFERRED      Ai Maravilla PA-C

## 2020-03-04 NOTE — LETTER
3/4/2020       RE: Danielito Chu  [address] [place] [address]     Dear Colleague,    Thank you for referring your patient, Danielito Chu, to the [facility] at [facility]. Please see a copy of my visit note below.    HPI:   Mr. Danielito Chu is a 63 yr old male with a history of NICM (LVEF at dong of 15%) s/p ICD (4/2017),  who underwent HM3 LVAD implant on 12/5/18 as DT (some history of mariajuana smoking, liver disease).     Last visit: 12/6/2019 with Dr. Watkins  He had atrial tachycardia. It was happening often. He was started on amiodarone and plan was for an ICD check 1 week later. The arrythmias were greatly improved after started amiodarone.    This visit  He is not feeling his best today.  He did not sleep well last night because he was up late watching the primary election coverage.  Was unable to sleep much once he went to sleep and now has a mild headache.  He attributes this to the poor sleep.  He also has little bit of left ear pressure today.  No facial drooping per patient, slurred speech, confusion, weakness on one side of his body.  His INRs most recently have been normal.    He denies any shortness of breath at rest.  He can walk three quarters of a mile before he feels short of breath.  This is about the same as last year.  He sleeps on 2 pillows, no orthopnea, no PND.  No lower extremity edema.  He does feel like he has little bit of bloating in his abdomen but this feels related to gas.  He does have a little bit of dizziness with very quick position changes.  He had had palpitations in the past but it is been 8 weeks since this happened.  No chest pain.  His appetite is stable but he is working very hard to control his weight so that he can stay transplant eligible.  He denies any blood in the urine or blood in the stool.  He denies symptoms of a driveline infection.  He denies stroke symptoms.  He does have headaches as  above.  No concerning neuro symptoms.    Cardiac Medications  ASA 81 mg daily  Coumadin  Amiodarone 200 mg daily  Bumex 2 mg BID  KCl 20 BID  Losartan 25 mg daily  Metoprolol succinate 25 mg daily  Spironolactone 37.5 mg daily    PAST MEDICAL HISTORY:  Past Medical History:   Diagnosis Date     Acute systolic congestive heart failure (H) 2017     Diabetes (H)      HTN (hypertension)      Hyperlipidemia      Liver disease      LVAD (left ventricular assist device) present (H)      3      Marijuana abuse      Mitral regurgitation      Nocturnal oxygen desaturation 3/29/2018     Nonischemic cardiomyopathy (H) 2017     SHIRLEY (obstructive sleep apnea)      Pacemaker 2017    ICD 17     Situational mixed anxiety and depressive disorder        FAMILY HISTORY:  Family History   Problem Relation Age of Onset     Heart Failure Father          at age 86     Heart Failure Paternal Uncle          at 66      Myocardial Infarction Paternal Uncle          at age 62     Coronary Artery Disease Mother          during CABG at age 41       SOCIAL HISTORY:  Social History     Socioeconomic History     Marital status: Single     Spouse name: Not on file     Number of children: Not on file     Years of education: Not on file     Highest education level: Not on file   Occupational History     Not on file   Social Needs     Financial resource strain: Not on file     Food insecurity:     Worry: Not on file     Inability: Not on file     Transportation needs:     Medical: Not on file     Non-medical: Not on file   Tobacco Use     Smoking status: Former Smoker     Packs/day: 0.30     Years: 20.00     Pack years: 6.00     Types: Cigarettes     Smokeless tobacco: Never Used     Tobacco comment: quit 3/2017   Substance and Sexual Activity     Alcohol use: No     Frequency: Never     Comment: rare     Drug use: No     Sexual activity: Not on file   Lifestyle     Physical activity:     Days per week: Not on  file     Minutes per session: Not on file     Stress: Not on file   Relationships     Social connections:     Talks on phone: Not on file     Gets together: Not on file     Attends Jain service: Not on file     Active member of club or organization: Not on file     Attends meetings of clubs or organizations: Not on file     Relationship status: Not on file     Intimate partner violence:     Fear of current or ex partner: Not on file     Emotionally abused: Not on file     Physically abused: Not on file     Forced sexual activity: Not on file   Other Topics Concern     Parent/sibling w/ CABG, MI or angioplasty before 65F 55M? Not Asked   Social History Narrative     Not on file       CURRENT MEDICATIONS:  allopurinol (ZYLOPRIM) 100 MG tablet, Take 2 tablets (200 mg) by mouth daily  amiodarone 200 MG PO tablet, Take 1 tablet (200 mg) by mouth daily  aspirin (ASA) 81 MG chewable tablet, Take 1 tablet (81 mg) by mouth daily  Blood Glucose Monitoring Suppl (BLOOD GLUCOSE MONITOR SYSTEM) w/Device KIT, three times a week  bumetanide (BUMEX) 1 MG tablet, Take 2 tablets (2 mg) by mouth 2 times daily  colchicine (COLCRYS) 0.6 MG tablet, Take 1 tablet (0.6 mg) by mouth daily  losartan (COZAAR) 25 MG tablet, Take 1 tablet (25 mg) by mouth daily  potassium chloride ER (K-DUR/KLOR-CON M) 10 MEQ CR tablet, Take 2 tablets (20 mEq) by mouth 2 times daily  spironolactone (ALDACTONE) 25 MG tablet, Take 1.5 tablets (37.5 mg) by mouth daily  warfarin (COUMADIN) 2 MG tablet, Take 3 mg PO daily at 6 pm until next INR check, then dose per INR goal 2-3  amoxicillin (AMOXIL) 500 MG capsule, Take 4 capsules (2,000 mg) by mouth once as needed (Take 4 capsules (2000mg), one hour before any dental cleanings or procedures) (Patient not taking: Reported on 9/10/2019)  enoxaparin (LOVENOX) 100 MG/ML syringe, Inject 100 mg (1 ml) every 12 hours as directed by the Anticoagulation Clinic. (Patient not taking: Reported on 9/10/2019)  tacrolimus  "(PROTOPIC) 0.1 % external ointment, Apply topically 2 times daily (Patient not taking: Reported on 3/4/2020)  traZODone (DESYREL) 100 MG tablet, TAKE 1 TABLET BY MOUTH EVERY DAY AT BEDTIME AS NEEDED FOR SLEEP (Patient not taking: Reported on 3/4/2020)  vitamin B1 (THIAMINE) 100 MG tablet, Take 1 tablet (100 mg) by mouth daily (Patient not taking: Reported on 9/10/2019)    No current facility-administered medications on file prior to visit.       ROS:   CONSTITUTIONAL: Denies fever, chills,  or weight fluctuations.   HEENT: Denies vision changes and changes in speech.   CV: Refer to HPI.   PULMONARY:Refer to HPI.   GI:Denies nausea, vomiting, diarrhea, and abdominal pain. Bowel movements are regular.   :Denies urinary alterations, dysuria, urinary frequency, hematuria, and abnormal drainage.   EXT: See HPI  SKIN:Denies abnormal rashes or lesions.   MUSCULOSKELETAL:Denies upper or lower extremity weakness and pain.   NEUROLOGIC:Denies seizures, or upper or lower extremity paresthesia.     EXAM:  BP (!) 80/0 (BP Location: Right arm, Patient Position: Chair, Cuff Size: Adult Regular)   Ht 1.765 m (5' 9.5\")   Wt 108.7 kg (239 lb 11.2 oz)   BMI 34.89 kg/m      GENERAL: Appears comfortable, in no distress speaking in full sentences and able to communicate all needs.  HEENT: Eye symmetrical and without discharge or icterus bilaterally. Mucous membranes moist and without lesions.  NECK: Supple, JVD 2 cm above clavicle at 60 degrees.   CV: Hum of LVAD, no adventitious sounds  RESPIRATORY: Respirations regular, even, and unlabored. Lungs CTA throughout.   GI: Soft and non distended with normoactive bowel sounds present in all quadrants. No tenderness, rebound, guarding. No organomegaly.   EXTREMITIES: No peripheral edema.  Non-pulsatile.   NEUROLOGIC: Alert and interacting appropriately.  Cranial nerves II through XII intact.  5 out of 5 upper and lower extremity strength.  No pronator drift.  Good rapid alternating " movements.  Gait observed and was steady.  No focal deficits.   MUSCULOSKELETAL: No joint swelling or tenderness.   SKIN: No jaundice. No rashes or lesions. Driveline dressing clean dry intact.    Labs - as reviewed in clinic with patient today:  CBC RESULTS:  Lab Results   Component Value Date    WBC 11.0 03/04/2020    RBC 4.84 03/04/2020    HGB 16.2 03/04/2020    HCT 47.2 03/04/2020    MCV 98 03/04/2020    MCH 33.5 (H) 03/04/2020    MCHC 34.3 03/04/2020    RDW 14.0 03/04/2020     03/04/2020       CMP RESULTS:  Lab Results   Component Value Date     03/04/2020    POTASSIUM 3.7 03/04/2020    CHLORIDE 99 03/04/2020    CO2 29 03/04/2020    ANIONGAP 6 03/04/2020     (H) 03/04/2020    BUN 13 03/04/2020    CR 1.26 (H) 03/04/2020    GFRESTIMATED 60 (L) 03/04/2020    GFRESTBLACK 69 03/04/2020    ASHLEE 9.2 03/04/2020    BILITOTAL 1.0 03/04/2020    ALBUMIN 4.0 03/04/2020    ALKPHOS 100 03/04/2020    ALT 53 03/04/2020    AST 38 03/04/2020        INR RESULTS:  Lab Results   Component Value Date    INR 2.81 (H) 03/04/2020       Lab Results   Component Value Date    MAG 2.1 12/13/2018     Lab Results   Component Value Date    NTBNPI 1,970 (H) 10/27/2018     Lab Results   Component Value Date    NTBNP 2,244 (H) 12/20/2018       Assessment and Plan:   Mr. Danielito Chu is a 63 yr old male with a history of NICM (LVEF at dong of 15%) s/p ICD (4/2017),  who underwent HM3 LVAD implant on 12/5/18 as DT (some history of mariajuana smoking, liver disease). He presents today for LVAD follow-up. He appears slightly hypovolemic, which appears to be secondary to not drinking a lot of water as he is worried about his weight.  Otherwise he is generally well-appearing today.    He has a headache which appears to be due to sleep deprivation last night.  No concerning neuro symptoms.  Discussed risks and benefits with the patient and we decided to defer a head CT today.    He did have some ventricular arrhythmias on his ICD  check.  In reaching out to the device clinic to see how long these lasted.  We will increase his metoprolol as below.    # Chronic systolic heart failure secondary to NICM s/p 3 LVAD as BTT on 12/5/2018, listed as status 4.  Stage D. NYHA Class II.    Fluid status hypovolemic-near euvolemic, reported is drinking less water to prepare for his weight today.  Encouraged to drink a little more water today.  ACEi/ARB Losartan 25 mg daily  BB Metoprolol 25 mg daily  Aldosterone antagonist Spironolatone 37.5 mg daily  SCD prophylaxis ICD  NSAID use: Contraindicated  Sleep Apnea Evaluation: Did not discuss in clinic today.  BP: Map is 80, goal is 65-85  LDH trends: 231, stable  Anticoagulation: warfarin INR goal 2-3 . 2.81 today  Antiplatelet: Aspirin 81 mg    VAD interrogation today: VAD interrogation reviewed with VAD coordinator. Agree with findings.  Occasional PI events 2 speed drops. no power spikes  or other findings suspicious of pump malfunction noted.     # Hypertension: Controlled.  Continue metoprolol XL and losartan.     # NSVT: Increase Metoprolol as outlined above.    # Obesity BMI of 34.9 today.  He is successfully kept his weight under the threshold for transplant listing, but he is having difficulty losing any more weight and this is his goal.  -Refer back to the dietitian.  #CKD stage II  - Cr slightly up today, has not been drinking much water to prepare for wt check today. Recheck BMP next week when he is drinking more regularly.    Follow up : BMP next week (instructed to drink more water, Cr was slightly up today and he had been avoiding water given weight check).  Dr. Watkins in 3 months with a right heart cath.  NP in 6 months.            40 minutes spent face-to-face with patient, >50% in counseling and/or coordination of care as described above      PITER Oscar  3/4/2020          CC  SELF, REFERRED      Again, thank you for allowing me to participate in the care of your patient.       Sincerely,    Ai Maravilla PA-C

## 2020-03-04 NOTE — PATIENT INSTRUCTIONS
Medications:  1. Please increases your metoprolol to 37.5mg once daily.    Follow-up:  1. Ben referred you to the dietician. You can set this up for the next time you are here for your [facility] and you Dr Watkins appointment.  2. Please make an appointment with Dr. Watkins in 3 months. We will do a right heart cath before his appointment.  3. Please make an appointment with Shania Jo, the PA or NP in 6 months.    Instructions:  1. Please get labs drawn in one week on 3/11/2020.  2. Please send a remote device check in 2 weeks on 3/18/2020    Page the VAD Coordinator on call if you gain more than 3 lb in a day or 5 in a week. Please also page if you feel unwell or have alarms.     Great to see you in clinic today. To Page the VAD Coordinator on call, dial [phone] option #[phone] and ask to speak to the VAD coordinator on call.

## 2020-03-04 NOTE — NURSING NOTE
Chief Complaint   Patient presents with     Follow Up     Vitals were taken and medications reconciled.     Thom Camp CMA  12:49 PM

## 2020-03-04 NOTE — LETTER
3/4/2020      RE: Danielito Chu  [address] [place] [address]       Dear Colleague,    Thank you for the opportunity to participate in the care of your patient, Danielito Chu, at the [facility] HEART [facility]. Please see a copy of my visit note below.    HPI:   Mr. Danielito Chu is a 63 yr old male with a history of NICM (LVEF at dong of 15%) s/p ICD (4/2017),  who underwent HM3 LVAD implant on 12/5/18 as DT (some history of mariajuana smoking, liver disease).     Last visit: 12/6/2019 with Dr. Watkins  He had atrial tachycardia. It was happening often. He was started on amiodarone and plan was for an ICD check 1 week later. The arrythmias were greatly improved after started amiodarone.    This visit  He is not feeling his best today.  He did not sleep well last night because he was up late watching the primary election coverage.  Was unable to sleep much once he went to sleep and now has a mild headache.  He attributes this to the poor sleep.  He also has little bit of left ear pressure today.  No facial drooping per patient, slurred speech, confusion, weakness on one side of his body.  His INRs most recently have been normal.    He denies any shortness of breath at rest.  He can walk three quarters of a mile before he feels short of breath.  This is about the same as last year.  He sleeps on 2 pillows, no orthopnea, no PND.  No lower extremity edema.  He does feel like he has little bit of bloating in his abdomen but this feels related to gas.  He does have a little bit of dizziness with very quick position changes.  He had had palpitations in the past but it is been 8 weeks since this happened.  No chest pain.  His appetite is stable but he is working very hard to control his weight so that he can stay transplant eligible.  He denies any blood in the urine or blood in the stool.  He denies symptoms of a driveline infection.  He denies stroke  symptoms.  He does have headaches as above.  No concerning neuro symptoms.    Cardiac Medications  ASA 81 mg daily  Coumadin  Amiodarone 200 mg daily  Bumex 2 mg BID  KCl 20 BID  Losartan 25 mg daily  Metoprolol succinate 25 mg daily  Spironolactone 37.5 mg daily    PAST MEDICAL HISTORY:  Past Medical History:   Diagnosis Date     Acute systolic congestive heart failure (H) 2017     Diabetes (H)      HTN (hypertension)      Hyperlipidemia      Liver disease      LVAD (left ventricular assist device) present (H)      3      Marijuana abuse      Mitral regurgitation      Nocturnal oxygen desaturation 3/29/2018     Nonischemic cardiomyopathy (H) 2017     SHIRLEY (obstructive sleep apnea)      Pacemaker 2017    ICD 17     Situational mixed anxiety and depressive disorder        FAMILY HISTORY:  Family History   Problem Relation Age of Onset     Heart Failure Father          at age 86     Heart Failure Paternal Uncle          at 66      Myocardial Infarction Paternal Uncle          at age 62     Coronary Artery Disease Mother          during CABG at age 41       SOCIAL HISTORY:  Social History     Socioeconomic History     Marital status: Single     Spouse name: Not on file     Number of children: Not on file     Years of education: Not on file     Highest education level: Not on file   Occupational History     Not on file   Social Needs     Financial resource strain: Not on file     Food insecurity:     Worry: Not on file     Inability: Not on file     Transportation needs:     Medical: Not on file     Non-medical: Not on file   Tobacco Use     Smoking status: Former Smoker     Packs/day: 0.30     Years: 20.00     Pack years: 6.00     Types: Cigarettes     Smokeless tobacco: Never Used     Tobacco comment: quit 3/2017   Substance and Sexual Activity     Alcohol use: No     Frequency: Never     Comment: rare     Drug use: No     Sexual activity: Not on file   Lifestyle     Physical  activity:     Days per week: Not on file     Minutes per session: Not on file     Stress: Not on file   Relationships     Social connections:     Talks on phone: Not on file     Gets together: Not on file     Attends Zoroastrian service: Not on file     Active member of club or organization: Not on file     Attends meetings of clubs or organizations: Not on file     Relationship status: Not on file     Intimate partner violence:     Fear of current or ex partner: Not on file     Emotionally abused: Not on file     Physically abused: Not on file     Forced sexual activity: Not on file   Other Topics Concern     Parent/sibling w/ CABG, MI or angioplasty before 65F 55M? Not Asked   Social History Narrative     Not on file       CURRENT MEDICATIONS:  allopurinol (ZYLOPRIM) 100 MG tablet, Take 2 tablets (200 mg) by mouth daily  amiodarone 200 MG PO tablet, Take 1 tablet (200 mg) by mouth daily  aspirin (ASA) 81 MG chewable tablet, Take 1 tablet (81 mg) by mouth daily  Blood Glucose Monitoring Suppl (BLOOD GLUCOSE MONITOR SYSTEM) w/Device KIT, three times a week  bumetanide (BUMEX) 1 MG tablet, Take 2 tablets (2 mg) by mouth 2 times daily  colchicine (COLCRYS) 0.6 MG tablet, Take 1 tablet (0.6 mg) by mouth daily  losartan (COZAAR) 25 MG tablet, Take 1 tablet (25 mg) by mouth daily  potassium chloride ER (K-DUR/KLOR-CON M) 10 MEQ CR tablet, Take 2 tablets (20 mEq) by mouth 2 times daily  spironolactone (ALDACTONE) 25 MG tablet, Take 1.5 tablets (37.5 mg) by mouth daily  warfarin (COUMADIN) 2 MG tablet, Take 3 mg PO daily at 6 pm until next INR check, then dose per INR goal 2-3  amoxicillin (AMOXIL) 500 MG capsule, Take 4 capsules (2,000 mg) by mouth once as needed (Take 4 capsules (2000mg), one hour before any dental cleanings or procedures) (Patient not taking: Reported on 9/10/2019)  enoxaparin (LOVENOX) 100 MG/ML syringe, Inject 100 mg (1 ml) every 12 hours as directed by the Anticoagulation Clinic. (Patient not taking:  "Reported on 9/10/2019)  tacrolimus (PROTOPIC) 0.1 % external ointment, Apply topically 2 times daily (Patient not taking: Reported on 3/4/2020)  traZODone (DESYREL) 100 MG tablet, TAKE 1 TABLET BY MOUTH EVERY DAY AT BEDTIME AS NEEDED FOR SLEEP (Patient not taking: Reported on 3/4/2020)  vitamin B1 (THIAMINE) 100 MG tablet, Take 1 tablet (100 mg) by mouth daily (Patient not taking: Reported on 9/10/2019)    No current facility-administered medications on file prior to visit.       ROS:   CONSTITUTIONAL: Denies fever, chills,  or weight fluctuations.   HEENT: Denies vision changes and changes in speech.   CV: Refer to HPI.   PULMONARY:Refer to HPI.   GI:Denies nausea, vomiting, diarrhea, and abdominal pain. Bowel movements are regular.   :Denies urinary alterations, dysuria, urinary frequency, hematuria, and abnormal drainage.   EXT: See HPI  SKIN:Denies abnormal rashes or lesions.   MUSCULOSKELETAL:Denies upper or lower extremity weakness and pain.   NEUROLOGIC:Denies seizures, or upper or lower extremity paresthesia.     EXAM:  BP (!) 80/0 (BP Location: Right arm, Patient Position: Chair, Cuff Size: Adult Regular)   Ht 1.765 m (5' 9.5\")   Wt 108.7 kg (239 lb 11.2 oz)   BMI 34.89 kg/m      GENERAL: Appears comfortable, in no distress speaking in full sentences and able to communicate all needs.  HEENT: Eye symmetrical and without discharge or icterus bilaterally. Mucous membranes moist and without lesions.  NECK: Supple, JVD 2 cm above clavicle at 60 degrees.   CV: Hum of LVAD, no adventitious sounds  RESPIRATORY: Respirations regular, even, and unlabored. Lungs CTA throughout.   GI: Soft and non distended with normoactive bowel sounds present in all quadrants. No tenderness, rebound, guarding. No organomegaly.   EXTREMITIES: No peripheral edema.  Non-pulsatile.   NEUROLOGIC: Alert and interacting appropriately.  Cranial nerves II through XII intact.  5 out of 5 upper and lower extremity strength.  No pronator " drift.  Good rapid alternating movements.  Gait observed and was steady.  No focal deficits.   MUSCULOSKELETAL: No joint swelling or tenderness.   SKIN: No jaundice. No rashes or lesions. Driveline dressing clean dry intact.    Labs - as reviewed in clinic with patient today:  CBC RESULTS:  Lab Results   Component Value Date    WBC 11.0 03/04/2020    RBC 4.84 03/04/2020    HGB 16.2 03/04/2020    HCT 47.2 03/04/2020    MCV 98 03/04/2020    MCH 33.5 (H) 03/04/2020    MCHC 34.3 03/04/2020    RDW 14.0 03/04/2020     03/04/2020       CMP RESULTS:  Lab Results   Component Value Date     03/04/2020    POTASSIUM 3.7 03/04/2020    CHLORIDE 99 03/04/2020    CO2 29 03/04/2020    ANIONGAP 6 03/04/2020     (H) 03/04/2020    BUN 13 03/04/2020    CR 1.26 (H) 03/04/2020    GFRESTIMATED 60 (L) 03/04/2020    GFRESTBLACK 69 03/04/2020    ASHLEE 9.2 03/04/2020    BILITOTAL 1.0 03/04/2020    ALBUMIN 4.0 03/04/2020    ALKPHOS 100 03/04/2020    ALT 53 03/04/2020    AST 38 03/04/2020        INR RESULTS:  Lab Results   Component Value Date    INR 2.81 (H) 03/04/2020       Lab Results   Component Value Date    MAG 2.1 12/13/2018     Lab Results   Component Value Date    NTBNPI 1,970 (H) 10/27/2018     Lab Results   Component Value Date    NTBNP 2,244 (H) 12/20/2018       Assessment and Plan:   Mr. Danielito Chu is a 63 yr old male with a history of NICM (LVEF at dong of 15%) s/p ICD (4/2017),  who underwent HM3 LVAD implant on 12/5/18 as DT (some history of mariajuana smoking, liver disease). He presents today for LVAD follow-up. He appears slightly hypovolemic, which appears to be secondary to not drinking a lot of water as he is worried about his weight.  Otherwise he is generally well-appearing today.    He has a headache which appears to be due to sleep deprivation last night.  No concerning neuro symptoms.  Discussed risks and benefits with the patient and we decided to defer a head CT today.    He did have some  ventricular arrhythmias on his ICD check.  In reaching out to the device clinic to see how long these lasted.  We will increase his metoprolol as below.    # Chronic systolic heart failure secondary to NICM s/p 3 LVAD as BTT on 12/5/2018, listed as status 4.  Stage D. NYHA Class II.    Fluid status hypovolemic-near euvolemic, reported is drinking less water to prepare for his weight today.  Encouraged to drink a little more water today.  ACEi/ARB Losartan 25 mg daily  BB Metoprolol 25 mg daily  Aldosterone antagonist Spironolatone 37.5 mg daily  SCD prophylaxis ICD  NSAID use: Contraindicated  Sleep Apnea Evaluation: Did not discuss in clinic today.  BP: Map is 80, goal is 65-85  LDH trends: 231, stable  Anticoagulation: warfarin INR goal 2-3 . 2.81 today  Antiplatelet: Aspirin 81 mg    VAD interrogation today: VAD interrogation reviewed with VAD coordinator. Agree with findings.  Occasional PI events 2 speed drops. no power spikes  or other findings suspicious of pump malfunction noted.     # Hypertension: Controlled.  Continue metoprolol XL and losartan.     # NSVT: Increase Metoprolol as outlined above.    # Obesity BMI of 34.9 today.  He is successfully kept his weight under the threshold for transplant listing, but he is having difficulty losing any more weight and this is his goal.  -Refer back to the dietitian.    #CKD stage II  - Cr slightly up today, has not been drinking much water to prepare for wt check today. Recheck BMP next week when he is drinking more regularly.    Follow up : BMP next week (instructed to drink more water, Cr was slightly up today and he had been avoiding water given weight check).  Dr. Watkins in 3 months with a right heart cath.  NP in 6 months.    *** Note done to here, but you did not sign encounter because you need to [facility] EP device team re: time of vent arrythmias        40 minutes spent face-to-face with patient, >50% in counseling and/or coordination of care as described  above      PITER Oscar  3/4/2020          CC  SELF, REFERRED      Please do not hesitate to contact me if you have any questions/concerns.     Sincerely,     ABIGAIL OscarC

## 2020-03-18 NOTE — PROGRESS NOTES
ANTICOAGULATION FOLLOW-UP CLINIC VISIT    Patient Name:  Danielito Chu  Date:  3/18/2020  Contact Type:  Telephone    SUBJECTIVE:  Patient Findings     Comments:   Patient reports that he is taking 2mg daily.         Clinical Outcomes     Comments:   Patient reports that he is taking 2mg daily.            OBJECTIVE    INR   Date Value Ref Range Status   2020 2.7 (A) 0.90 - 1.10 Corrected     Factor 2 Assay   Date Value Ref Range Status   10/27/2018 70 60 - 140 % Final     Comment:     The Factor 2 activity level is not a screening test for the Prothrombin 96036   mutation.         ASSESSMENT / PLAN  No question data found.  Anticoagulation Summary  As of 3/18/2020    INR goal:   2.0-3.0   TTR:   79.9 % (1 y)   INR used for dosin.7 (3/17/2020)   Warfarin maintenance plan:   2 mg (2 mg x 1) every day   Full warfarin instructions:   2 mg every day   Weekly warfarin total:   14 mg   Plan last modified:   Bri Becerril RN (3/18/2020)   Next INR check:   2020   Priority:   Critical   Target end date:   Indefinite    Indications    LVAD (left ventricular assist device) present (H) [Z95.811]  Long term (current) use of anticoagulants [Z79.01]             Anticoagulation Episode Summary     INR check location:       Preferred lab:       Send INR reminders to:   [facility]    Comments:   [place] [facility] Vineet Gallegos Ph [phone]/Fax [phone]  LVAD plced 18 HM 3 ASA 81mg Daily Jantoven 2mg tabs Lab [phone]  Call pt at [phone] Emphasize next INR date with pt++Send email each time++  Pt is home .      Anticoagulation Care Providers     Provider Role Specialty Phone number    Lorena Watkins MD Referring Cardiology [phone]            See the Encounter Report to view Anticoagulation Flowsheet and Dosing Calendar (Go to Encounters tab in chart review, and find the Anticoagulation Therapy Visit)    Spoke with patient.     Bri ANDREWS  ARAVIND Becerril

## 2020-03-21 NOTE — LETTER
3/22/2019      RE: Danielito Chu  [address] [place] [address]       Dear Colleague,    Thank you for the opportunity to participate in the care of your patient, Danielito Chu, at the [facility] HEART [facility] at [facility]. Please see a copy of my visit note below.    Electrophysiology Clinic Note  HPI:   Mr. Chu is a 63 year old male who has a past medical history significant for NIDCM LVEF 15%, s/p ICD 4/7/17, s/p LVAD 12/5/18, mild-moderate MR, pre-DM, former tobacco use, untreated sleep apnea (central and obstructive), HTN, and hepatic congestion.     He has had HF for 10 years and has been on medications since then. He states he was doing well and out of the hospital for 8 years and then in had several hospitalizations for HF over the last couple years. ICD was discussed about 2 years ago with him and he never pursued it due to job changes at the time. He then presented to [facility] on 3/21/17 reporting progressive SOB, lower extremity weakness, weight gain, and orthopnea. He was found to have elevated BNP, slightly elevated LFTs, and CXR c/w CHF. He was started on IV lasix but did not diurese well with minimal UOP and was referred to [facility]. He went to cath lab for RHC/LHC which showed no significant CAD, elevated right and left sided filling pressures, moderate secondary pulmonary hypertension with a mean PAP of 48 mmHg, and decreased cardiac output. He was tuned up from a HF standpoint and then referred for ICD. He underwent ICD on 4/7/17. He did well until Summer 2018 and then had a couple admsision for HF and in 11/2018 was discharged home on milrinone gtt. He ultimately elected to undergo LVAD which he had placed on 12/5/18.      He presents today for follow up. He reports feeling well. He is recovering well after LVAD implant. He was noted to have decreased R waves post LVAD implant. R waves were measure 2.4 mV post LVAD and now up to 3.3 today. He  denies any chest pain/pressures, dizziness, lightheadedness, worsening shortness of breath, leg/ankle swelling, PND, orthopnea, palpitations, or syncopal symptoms.  Device interrogation shows normal ICD function.  Since 1/15/19, there have been 10 NSVT and 12 monitored VT episodes recorded lasting between 12 sec to 9 minutes in duration with rates from 160s to 190s. Available egms similar QRS to intrinsic with somewhat irregular R-R intervals at times. Likely representing short AF episodes. Intrinsic rhythm = VS @ 92 bpm.   = <1%.  No short v-v intervals recorded. He is also seeing  today. Current cardiac medications include: Bumex, Spironolactone, Losartan, ASA, Toprol XL, and Warfarin.         PAST MEDICAL HISTORY:  Past Medical History:   Diagnosis Date     Acute systolic congestive heart failure (H) 4/5/2017     Diabetes (H)      HTN (hypertension)      Hyperlipidemia      Liver disease      LVAD (left ventricular assist device) present (H)      3 12/18     Marijuana abuse      Mitral regurgitation      Nocturnal oxygen desaturation 3/29/2018     Nonischemic cardiomyopathy (H) 4/5/2017     SHIRLEY (obstructive sleep apnea)      Pacemaker 04/07/2017    ICD 4/7/17     Situational mixed anxiety and depressive disorder        CURRENT MEDICATIONS:  Current Outpatient Medications   Medication Sig Dispense Refill     allopurinol (ZYLOPRIM) 100 MG tablet Take 2 tablets (200 mg) by mouth daily 60 tablet 1     artificial saliva (BIOTENE DRY MOUTHWASH) LIQD liquid Swish and spit 10 mLs in mouth 4 times daily as needed for dry mouth 59 mL 0     aspirin (ASA) 81 MG chewable tablet Take 1 tablet (81 mg) by mouth daily 120 tablet 0     Blood Glucose Monitoring Suppl (BLOOD GLUCOSE MONITOR SYSTEM) w/Device KIT three times a week       bumetanide (BUMEX) 1 MG tablet Take 2 mg in the am and 1 mg in the evening. 90 tablet 3     colchicine (COLCYRS) 0.6 MG tablet Take 1 tablet (0.6 mg) by mouth 2 times daily 60 tablet 3      losartan (COZAAR) 25 MG tablet Take 1 tablet (25 mg) by mouth daily 30 tablet 3     metoprolol succinate ER (TOPROL XL) 25 MG 24 hr tablet Take 0.5 tablets (12.5 mg) by mouth daily 45 tablet 1     omeprazole (PRILOSEC) 20 MG DR capsule Take 1 capsule (20 mg) by mouth every morning (before breakfast) (Patient not taking: Reported on 2019) 90 capsule 3     potassium chloride ER (K-DUR/KLOR-CON M) 20 MEQ CR tablet Take 1 tablet (20 mEq) by mouth 2 times daily 90 tablet 3     spironolactone (ALDACTONE) 25 MG tablet Take 1.5 tablets (37.5 mg) by mouth daily 145 tablet 3     traZODone (DESYREL) 100 MG tablet TAKE 1 TABLET BY MOUTH EVERY DAY AT BEDTIME AS NEEDED FOR SLEEP 30 tablet 1     traZODone (DESYREL) 100 MG tablet Take 1 tablet (100 mg) by mouth nightly as needed for sleep 30 tablet 0     vitamin B1 (THIAMINE) 100 MG tablet Take 1 tablet (100 mg) by mouth daily 30 tablet 0     warfarin (COUMADIN) 2 MG tablet Take 3 mg PO daily at 6 pm until next INR check, then dose per INR goal 2-3 150 tablet 1       PAST SURGICAL HISTORY:  Past Surgical History:   Procedure Laterality Date     ESOPHAGOSCOPY, GASTROSCOPY, DUODENOSCOPY (EGD), COMBINED N/A 2018    Procedure: COMBINED ESOPHAGOSCOPY, GASTROSCOPY, DUODENOSCOPY (EGD);  Surgeon: Candido Mendez MD;  Location:  GI     IMPLANT IMPLANTABLE CARDIOVERTER DEFIBRILLATOR       INSERT VENTRICULAR ASSIST DEVICE LEFT (HEARTMATE II/III) MINIMALLY INVASIVE N/A 2018    Procedure: Partial Median Sternotomy, Left Thoracotomy, Minimally Invasive Left Ventricular Assist Device Placement (Heartmate III), On Cardiopulmonary Bypass;  Surgeon: Andrei Silva MD;  Location:  OR       ALLERGIES:   No Known Allergies    FAMILY HISTORY:  Family History   Problem Relation Age of Onset     Heart Failure Father          at age 86     Heart Failure Paternal Uncle          at 66      Myocardial Infarction Paternal Uncle          at age 62      "Coronary Artery Disease Mother          during CABG at age 41       SOCIAL HISTORY:  Social History     Tobacco Use     Smoking status: Former Smoker     Smokeless tobacco: Never Used     Tobacco comment: quit 3/2017   Substance Use Topics     Alcohol use: No     Frequency: Never     Comment: social     Drug use: No       ROS:   A comprehensive 10 point review of systems negative other than as mentioned in HPI.  Exam:  BP (!) 64/0 (BP Location: Right arm, Patient Position: Chair, Cuff Size: Adult Regular)   Pulse 79   Ht 1.753 m (5' 9\")   Wt 93.9 kg (207 lb)   SpO2 97%   BMI 30.57 kg/m     GENERAL APPEARANCE: alert and no distress  HEENT: no icterus, no xanthelasmas, normal pupil size and reaction, normal palate, mucosa moist, no central cyanosis  NECK: no adenopathy, no asymmetry, masses, or scars, thyroid normal to palpation and no bruits, JVP not elevated  RESPIRATORY: lungs clear to auscultation - no rales, rhonchi or wheezes, no use of accessory muscles, no retractions, respirations are unlabored, normal respiratory rate  CARDIOVASCULAR: LVAD Hum, regular.   ABDOMEN: soft, non tender, bowel sounds normal, non-distended,drive line site covered CDI.  EXTREMITIES: peripheral pulses normal, no edema  NEURO: alert and oriented to person/place/time, normal speech, gait and affect  SKIN: no ecchymoses, no rashes  PSYCH: normal affect, cooperative    Labs:  Reviewed.     Testing/Procedures:  PULMONARY FUNCTION TESTS:   PFT Latest Ref Rng & Units 10/24/2018   FVC L 3.21   FEV1 L 2.47   FVC% % 79   FEV1% % 78         2019 ECHOCARDIOGRAM:   Interpretation Summary  LVAD ramp study with settings ranging from 5300 to 5600 rpm. Please refer to  the EPIC report for measurements performed at different LVAD speed settings.     Baseline speed was 5500 rpm. At baseline setting there was moderate left  ventricular dilatation with LVIDd 6.4 cm. Right ventricular dilatation was  moderate and right ventricular systolic " function appears mild-moderately  reduced. Ventricular septum deviates to the right.  Limited Doppler of inflow/outflow cannula.  Aortic valve does not open.  Mild to moderate aortic insufficiency is continuous and worse compared to  study done 12/11/18.      Assessment and Plan:   Mr. Chu is a 63 year old male who has a past medical history significant for NIDCM LVEF 15%, s/p ICD 4/7/17, s/p LVAD 12/5/18, mild-moderate MR, pre-DM, former tobacco use, untreated sleep apnea (central and obstructive), HTN, and hepatic congestion. He has had HF for 10 years and has been on medications since then. He underwent ICD on 4/7/17. He did well until Summer 2018 and then had a couple admsision for HF and in 11/2018 was discharged home on milrinone gtt. He ultimately elected to undergo LVAD which he had placed on 12/5/18. He presents today for follow up. He reports feeling well. He is recovering well after LVAD implant. He was noted to have decreased R waves post LVAD implant. R waves were measure 2.4 mV post LVAD and now up to 3.3 today. Otherwise, device interrogation shows normal ICD function.  Since 1/15/19, there have been 10 NSVT and 12 monitored VT episodes recorded lasting between 12 sec to 9 minutes in duration with rates from 160s to 190s. Available egms similar QRS to intrinsic with somewhat irregular R-R intervals at times. Likely representing short AF episodes. These are asymptomatic and he is anticoagulated. Intrinsic rhythm = VS @ 92 bpm.   = <1%.  No short v-v intervals recorded. He is also seeing  today. Current cardiac medications include: Bumex, Spironolactone, Losartan, ASA, Toprol XL, and Warfarin.     NICM LVEF 15%,s/p LVAD 12/5/18:  1. ACEi/ARB: Continue Losartan.  2. BB: Continue  Metoprolol  3. Aldosterone antagonist: Continue spironolactone.  4. SCD prophylaxis: s/p ICD on 4/7/17. Post LVAD he has had small R waves measuring 2.4-3.3. We will keep an eye on these for now and not pursue  lead revision. If they start to decline further, then we would need to consider revising lead. Otherwise, if they remain stable at current measurements or improve, we will leave it alone and monitor. We will have him send a remote transmission in a couple months to monitor.   5. Fluid status: euvolemic, continue Bumex    He will continue to follow with device clinic and LVAD HF team per routine. He can follow up with EP on an as needed basis.     The patient states understanding and is agreeable with plan.     JOSE A Diaz CNP  Pager: [phone]    ARAM AVELAR           - - -

## 2020-03-23 NOTE — PROGRESS NOTES
I called Fabiano back after he left me a message in email. He inquired about travelling to his house in [place]. I told Fabiano with the viral outbreak, now was not a good time for him to be travelling in airport and on airplanes. I encouraged him to keep up the social distancing at his cabin in [place].

## 2020-04-01 NOTE — PROGRESS NOTES
4/1/2020:  Spoke with Fabiano.  He plans to have his INR checked tomorrow, 4/2/2020.  He reports he has not had changes in health, diet, medications.  He is in [facility] and plans to go to the [place] lab.  Jeanette Bryant RN

## 2020-04-02 NOTE — PROGRESS NOTES
4/16/20 ADDENDUM  Spoke to Fabiano.  He will go into the lab on Tuesday.  Updated calendar.  Will need to reference Care Everywhere for results.  ARAVIND Barnard          ANTICOAGULATION FOLLOW-UP CLINIC VISIT    Patient Name:  Danielito Chu  Date:  4/2/2020  Contact Type:  Telephone    SUBJECTIVE:         OBJECTIVE    INR   Date Value Ref Range Status   04/01/2020 3.0 (A) 0.90 - 1.10 Final     Factor 2 Assay   Date Value Ref Range Status   10/27/2018 70 60 - 140 % Final     Comment:     The Factor 2 activity level is not a screening test for the Prothrombin 20745   mutation.         ASSESSMENT / PLAN  No question data found.  Anticoagulation Summary  As of 4/2/2020    INR goal:   2.0-3.0   TTR:   79.9 % (1 y)   INR used for dosing:   3.0 (4/1/2020)   Warfarin maintenance plan:   2 mg (2 mg x 1) every day   Full warfarin instructions:   2 mg every day   Weekly warfarin total:   14 mg   No change documented:   Bri Becerril RN   Plan last modified:   Bri Becerril RN (3/18/2020)   Next INR check:   4/16/2020   Priority:   Critical   Target end date:   Indefinite    Indications    LVAD (left ventricular assist device) present (H) [Z95.811]  Long term (current) use of anticoagulants [Z79.01]             Anticoagulation Episode Summary     INR check location:       Preferred lab:       Send INR reminders to:   [facility]    Comments:   [place] 4/27-5/8 [facility] [phone]/Fax [phone]  LVAD plced 12/5/18 HM 3 ASA 81mg Daily Jantoven 2mg tabs Lab [phone]/[phone]  Call pt at [phone] Emphasize next INR date with pt++Send email each time++  Pt is home .      Anticoagulation Care Providers     Provider Role Specialty Phone number    Lorena Watkins MD Referring Cardiology [phone]            See the Encounter Report to view Anticoagulation Flowsheet and Dosing Calendar (Go to Encounters tab in chart review, and find the Anticoagulation Therapy  Visit)    Spoke with patient.     Bri Becerril RN

## 2020-04-21 NOTE — PROGRESS NOTES
20 ADDENDUM  Spoke to Fabiano.  He will get an INR at his appt on Thursday.  Updated calendar.  TN          ANTICOAGULATION FOLLOW-UP CLINIC VISIT    Patient Name:  Danielito Chu  Date:  2020  Contact Type:  Telephone    SUBJECTIVE:         OBJECTIVE    INR   Date Value Ref Range Status   2020 2.6 (A) 0.90 - 1.10 Final     Comment:     Outside Lab      Factor 2 Assay   Date Value Ref Range Status   10/27/2018 70 60 - 140 % Final     Comment:     The Factor 2 activity level is not a screening test for the Prothrombin 63405   mutation.         ASSESSMENT / PLAN  INR assessment THER    Recheck INR In: 4 WEEKS    INR Location Outside lab      Anticoagulation Summary  As of 2020    INR goal:   2.0-3.0   TTR:   81.2 % (1 y)   INR used for dosin.6 (2020)   Warfarin maintenance plan:   2 mg (2 mg x 1) every day   Full warfarin instructions:   2 mg every day   Weekly warfarin total:   14 mg   Plan last modified:   Bri Becerril RN (3/18/2020)   Next INR check:   2020   Priority:   Critical   Target end date:   Indefinite    Indications    LVAD (left ventricular assist device) present (H) [Z95.811]  Long term (current) use of anticoagulants [Z79.01]             Anticoagulation Episode Summary     INR check location:       Preferred lab:       Send INR reminders to:   [facility]    Comments:   [place] [facility] [phone]/Fax [phone]  LVAD plced 18 HM 3 ASA 81mg Daily Jantoven 2mg tabs Lab [phone]/[id number]  Call pt at [phone] Emphasize next INR date with pt++Send email each time++  Pt is home .      Anticoagulation Care Providers     Provider Role Specialty Phone number    Lorena Watkins MD Referring Cardiology [phone]            See the Encounter Report to view Anticoagulation Flowsheet and Dosing Calendar (Go to Encounters tab in chart review, and find the Anticoagulation Therapy Visit)    Left message for  patient with results and dosing recommendations. Asked patient to call back to report any missed doses, falls, signs and symptoms of bleeding or clotting, any changes in health, medication, or diet. Asked patient to call back with any questions or concerns.     Patient had LVAD placed on:   12/5/18  Type of LVAD: HM 3  Patient's current Aspirin dose: ASA 81mg Daily  LVAD Protocol followed: No   If Not Followed Explanation:  Per Covid-19 Dr. Watkins ok with giving 4 weeks between labs    Kristie Fernandez RN

## 2020-04-24 NOTE — TELEPHONE ENCOUNTER
COLCHICINE 0.6 MG TABLET       Last Written Prescription Date:  2/27/20  Last Fill Quantity: 30,   # refills: 2  Last Office Visit : 12/26/18  Future Office visit:  7/16/20    Routing refill request to provider for review/approval because:  Last creatinine abnormal and over due for uric acid level    Lab Test 12/26/18  1325   URIC 4.5     Lab Test 03/04/20  1157   CR 1.26*     Creatinine from 3/17/20 in care everywhere abnormal at 1.20

## 2020-04-29 NOTE — TELEPHONE ENCOUNTER
losartan (COZAAR) 25 MG tablet     Last Written Prescription Date:  5/7/2019  Last Fill Quantity: 90,   # refills: 3  Last Office Visit : 3/4/2020  Future Office visit:  6/19/2020  90 Tabs sent to pharm to cover Pt until next office visit in June 2020.    Pratima Mosley RN  Central Triage Red Flags/Med Refills      Creatinine  0.66 - 1.25 mg/dL  1.26High          Notes recorded by Ashley Domingo, RN on 3/10/2020 at 2:42 PM CDT   Reviewed and within acceptable or expected limits for potential heart transplant recipient (testing is a part of standard waitlist management protocol).   ------     Notes recorded by Dai Moura LPN on 3/5/2020 at 12:19 PM CST   Patient followed by VAD coordinator and/or coumadin clinic.     ------     Notes recorded by Ai Maravilla PA-C on 3/4/2020 at 1:29 PM CST   Reviewed with patient in clinic.       Warnings Override History for losartan (COZAAR) 25 MG tablet [[id number]]     Overridden by Doege, Kathleen M, RN on May 7, 2019 6:23 PM    Drug-Drug    1. POTASSIUM-SPARING DIURETICS / ANGIOTENSIN II RECEPTOR ANTAGONISTS [Level: Major]    Other Orders:  spironolactone (ALDACTONE) 25 MG tablet

## 2020-05-05 NOTE — TELEPHONE ENCOUNTER
amiodarone 200 MG PO tablet    Last Written Prescription Date: 2/21/20  Last Fill Quantity: 90,   # refills: 3  Last Office Visit :  3/4/20  Future Office visit:  6/19/20      Routing refill request to provider for review/approval because: current rx #90, 3rfs historical.  rf request 1 tab tid??

## 2020-05-18 NOTE — PROGRESS NOTES
Status 4 Heart Extension Complete 05/18/20    This patient meets status 4 criteria as evidenced by:     Dischargeable LVAD without discretionary 30 days     Patient's primary cardiologist and/or attending physician is in agreement with this plan.      Status 4 Extension due 08/17/20

## 2020-05-20 NOTE — PROGRESS NOTES
Fabiano called in to check in with his VAD coordinator and inquire about travelling to [place] to take care of his rental property. I told him that he can travel when he wants but he would have to let his transplant coordinator know so she can inactivate him on the list while he is gone. I told him it is good to be mostly in [place] while he is waiting for a transplant but life has to go on and if he needs to take a trip, he needs to take a trip. I also told him that Ashley can give him the updates on the consent process for the newer transplant protocols. Fabiano said he'd call Ashley the transplant coordinator.  P: Labs, [facility] and Dr. Watkins appointment on 6/19

## 2020-05-21 NOTE — PROGRESS NOTES
ANTICOAGULATION FOLLOW-UP CLINIC VISIT    Patient Name:  Danielito Chu  Date:  2020  Contact Type:  Telephone    SUBJECTIVE:         OBJECTIVE    Recent labs: (last 7 days)     20   INR 2.3*       ASSESSMENT / PLAN  No question data found.  Anticoagulation Summary  As of 2020    INR goal:   2.0-3.0   TTR:   82.1 % (1 y)   INR used for dosin.3 (2020)   Warfarin maintenance plan:   2 mg (2 mg x 1) every day   Full warfarin instructions:   2 mg every day   Weekly warfarin total:   14 mg   No change documented:   Bri Becerril RN   Plan last modified:   Bri Becerril RN (3/18/2020)   Next INR check:   2020   Priority:   Critical   Target end date:   Indefinite    Indications    LVAD (left ventricular assist device) present (H) [Z95.811]  Long term (current) use of anticoagulants [Z79.01]  Chronic systolic congestive heart failure (H) [I50.22]             Anticoagulation Episode Summary     INR check location:       Preferred lab:       Send INR reminders to:   [facility]    Comments:   [place] [facility] [phone]/Fax [phone]  LVAD plced 18  3 ASA 81mg Daily Jantoven 2mg tabs Lab [phone]/[phone]  Call pt at [phone] Emphasize next INR date with pt++Send email each time++  Pt is home .      Anticoagulation Care Providers     Provider Role Specialty Phone number    Lorena Watkins MD Referring Cardiology [phone]            See the Encounter Report to view Anticoagulation Flowsheet and Dosing Calendar (Go to Encounters tab in chart review, and find the Anticoagulation Therapy Visit)    Spoke with patient.     Bri Becerril RN

## 2020-05-22 NOTE — LETTER
PRE-HEART TRANSPLANT CLINIC APPOINTMENTS    Patient : Danielito Chu  MR#: [id number]  Coordinator: Ashley RICHTER [phone]  : Justina [phone]  Dates: June 4, 2020 & June 17, 2020 June 19, 2020 & July 16, 2020 September 8, 2020          Day/Date:   Thursday, June 4, 2020  Time Location Activity   1:00 PM Telephone Visit: You will receive a phone call to: [phone] Appointment with [facility],  Transplant                Day/Date:   Wednesday, June 17, 2020  Time Location Activity    Remote Device Check Remote Device Check                           Day/Date:   Friday, June 19, 2020  Time Location Activity   9:30 AM [facility] Waiting Room  2nd floor Lab Draw   10:00 AM [facility] Waiting Room  2nd floor Right Heart Cath  (Do Not eat or drink for three hours before test! Please call your Heart Transplant Coordinator if you take Blood Thinners!)   1:00 PM [facility]  Heart Care Clinic  3rd floor; Clinic 3L Cardiac Device Check   1:30 PM [facility]  Heart Care Clinic  3rd floor; Clinic 3L Appointment with Dr. Watkins,  Cardiologist   2:00 PM [facility]  Pulmonary Function Lab  3rd floor Six-Minute Walk           Day/Date:   Thursday, July 16, 2020  Time Location Activity   11:00 AM [facility]  Rheumatology Clinic  3rd floor; Clinic 3B Appointment with Dr. Nassar,  Rheumatologist             Day/Date:   Tuesday, September 8, 2020  Time Location Activity   11:15 AM [facility] Surgery [address]; [place] [address]  Imaging and Lab Testing  1st floor Blood tests     11:30 AM [facility]  Heart Care Clinic  3rd floor; Clinic 3L Cardiac Device Check   12:30 PM [facility]  Heart Care Clinic  3rd floor; Clinic 3L Appointment with PITER Oscar, Cardiology

## 2020-05-22 NOTE — LETTER
Danielito Chu  [address] [facility]  [place] [address]    May 22, 2020  Danielito Chu  : 1955  ICD10:  I50    Subject: Vaccination Update Request    Danielito Chu is a potential heart transplant recipient currently being followed by the [facility].  We would like to request your assistance in obtaining the following tests and/or procedures in your local community to assist in the care of our mutual patient:    Inactivated Influenza Vaccine (IIV)-yearly    Tetanus, diphtheria, Pertussis (Dtap) if none in 10 years and Tdap booster every 5-10 years    Hepatitis B vaccine series (if HBsAb negative, HBcAB and ABsAg negative)-see results below    Pneumociccal vaccine:  -if naive to any pneumonia vaccine:  PCV-13, PPSV-23 vaccine > or = 8 weeks later, subsequent PPSV-23 vaccine > or = 5 years from the prior PPSV-23    -if with prior PPSV-23 x1 in the past:  PCV-13 > or = 1 year from prior PPSV-23  PPSV -23 > or = 5 years from the prior PPSV-23 and > or = 8 weeks from the prior PCV-13    -if with prior PPSV-23 x2 in the past:  PCV-13 > or = 1 year from the last PPSV-23    Shinrix    Results for JAYCEE CHU (MRN [id number]) as of 2020 15:17   Ref. Range 10/24/2018 06:34   CMV Antibody IgG Latest Ref Range: 0.0 - 0.8 AI >8.0 (H)   EBV Capsid Antibody IgG Latest Ref Range: 0.0 - 0.8 AI >8.0 (H)   HERPES SIMPLEX VIRUS TYPE 1 AND 2 IGG Unknown Rpt (A)   Hep B Surface Agn Latest Ref Range: NR^Nonreactive  Nonreactive   Hepatitis B Surface Antibody Latest Ref Range: <8.00 m[IU]/mL 0.45   Hepatitis B Core Trisha Latest Ref Range: NR^Nonreactive  Nonreactive   Hepatitis C Antibody Latest Ref Range: NR^Nonreactive  Nonreactive   HIV Antigen Antibody Combo Pretransplant Latest Ref Range: NR^Nonreactive  Nonreactive   Varicella Zoster Virus Antibody IgG Latest Ref Range: 0.0 - 0.8 AI 5.0 (H)       Please fax results as soon as they are available to:   Thoracic  Transplant Department  Fax Number:  [phone]    Thank you  for your continued support and the opportunity to collaborate in the care of this patient.  If you have any questions, please call Ashley Domingo, Transplant Coordinator,  at [phone] (option 2) or [phone].      Lorena Watkins M.D.  Division of Cardiology   of Medicine    Ashley Domingo RN, BSN  Heart Transplant Coordinator  [facility]                                            May 22, 2020

## 2020-05-22 NOTE — TELEPHONE ENCOUNTER
Provider Call: Voicemail  Date/Time: 5/22/20 @ 3:00 PM  Reason for call: Anu from [facility] called to give correct fax number for Dr Desouza. [phone]

## 2020-05-22 NOTE — Clinical Note
Please schedule with next visit in June. SW can be done virtual/telephone Needs 6 MW added to his next appointments  Please email a schedule to him with all appointments  Vanesa-I told Fabiano we would send a schedule out (transplant schedulers will do the scheduling for these things and send letter with everything)

## 2020-05-22 NOTE — LETTER
Danielito Chu  [address] [facility]  [place] [address]    May 22, 2020  Danielito Chu  : 1955  ICD10:  I50    Subject: Vaccination Update Request    Danielito Chu is a potential heart transplant recipient currently being followed by the [facility].  We would like to request your assistance in obtaining the following tests and/or procedures in your local community to assist in the care of our mutual patient:    Inactivated Influenza Vaccine (IIV)-yearly    Tetanus, diphtheria, Pertussis (Dtap) if none in 10 years and Tdap booster every 5-10 years    Hepatitis B vaccine series (if HBsAb negative, HBcAB and ABsAg negative)-see results below    Pneumociccal vaccine:  -if naive to any pneumonia vaccine:  PCV-13, PPSV-23 vaccine > or = 8 weeks later, subsequent PPSV-23 vaccine > or = 5 years from the prior PPSV-23    -if with prior PPSV-23 x1 in the past:  PCV-13 > or = 1 year from prior PPSV-23  PPSV -23 > or = 5 years from the prior PPSV-23 and > or = 8 weeks from the prior PCV-13    -if with prior PPSV-23 x2 in the past:  PCV-13 > or = 1 year from the last PPSV-23    Shinrix    Results for JAYCEE CHU (MRN [id number]) as of 2020 15:17   Ref. Range 10/24/2018 06:34   CMV Antibody IgG Latest Ref Range: 0.0 - 0.8 AI >8.0 (H)   EBV Capsid Antibody IgG Latest Ref Range: 0.0 - 0.8 AI >8.0 (H)   HERPES SIMPLEX VIRUS TYPE 1 AND 2 IGG Unknown Rpt (A)   Hep B Surface Agn Latest Ref Range: NR^Nonreactive  Nonreactive   Hepatitis B Surface Antibody Latest Ref Range: <8.00 m[IU]/mL 0.45   Hepatitis B Core Trisha Latest Ref Range: NR^Nonreactive  Nonreactive   Hepatitis C Antibody Latest Ref Range: NR^Nonreactive  Nonreactive   HIV Antigen Antibody Combo Pretransplant Latest Ref Range: NR^Nonreactive  Nonreactive   Varicella Zoster Virus Antibody IgG Latest Ref Range: 0.0 - 0.8 AI 5.0 (H)       Please fax results as soon as they are available to:   Thoracic Transplant  Department  Fax Number:  [phone]    Thank you  for your continued support and the opportunity to collaborate in the care of this patient.  If you have any questions, please call Ashley Domingo, Transplant Coordinator,  at [phone] (option 2) or [phone].      Lorena Watkins M.D.  Division of Cardiology   of Medicine    Ashley Domingo RN, BSN  Heart Transplant Coordinator  [facility]

## 2020-05-22 NOTE — TELEPHONE ENCOUNTER
Transplant Update 05/22/20:  Topic:  Update    Patient called and had questions about travel to [place] in July. Explained that due to COVID-19 restrictions it is difficult to assess at this point in time.  Made a plan to discuss with Dr. Watkins at his upcoming visit.    Other items discussed:    Vaccinations-patient has not received updates, sent orders to PCP (also sent 1 year ago, patient travels quite a bit and has not gone in)    PRAs-will get next week    RHC-scheduled in June    6MW, labs and sw work visit to also be scheduled for June visit    Hep C-patient interested in hearing more information at his next visit in regards to Hep C donors    .ambika

## 2020-05-29 NOTE — TELEPHONE ENCOUNTER
"Patient Call: Voicemail  Date/Time: 5/29/20 @ 1:06 PM  Reason for call: Patient called and stated he would like a \"referral for antibody testing\"    "

## 2020-05-29 NOTE — LETTER
Danielito Chu  [address] [place] [address]    2020  Danielito Chu  : 1955  ICD10:  I50    Subject: Request for Additional Testing    Danielito Chu is a potential heart transplant recipient currently being followed by the [facility].  We would like to request your assistance in obtaining the following tests and/or procedures in your local community to assist in the care of our mutual patient:     PRA (Panel of Reactive Antibodies) every 3 months or as requested by the transplant  center.  Patient will provide kit with instructions for send out back to [employer].    Please fax results as soon as they are available to:   Thoracic Transplant Department  Fax Number:  [phone]    Thank you  for your continued support and the opportunity to collaborate in the care of this patient.  If you have any questions, please call Ashley Domingo, Transplant Coordinator,  at [phone] (option 2) or [phone].      Lorena Watkins M.D.  Division of Cardiology   of Medicine    Ashley Domingo RN, BSN  Heart Transplant Coordinator  [facility]                      2020

## 2020-06-02 NOTE — TELEPHONE ENCOUNTER
Spoke to patient, needs PRA order for clinic.  Sent to Dr. Desouza, patient will contact them tomorrow.

## 2020-06-02 NOTE — TELEPHONE ENCOUNTER
Spoke with the patient and confirmed Social Work Telephone Visit  for 6/4/20 and 6 minute Walk added onto 6/19/20 appointments.  Informed patient an itinerary can be accessed on [email or web address], and will be sent via email.

## 2020-06-05 NOTE — TELEPHONE ENCOUNTER
Called Dr. Desouza office as they had questions about the  orders sent.  Reviewed vaccination requirements with nurse, no further questions at this time.

## 2020-06-09 NOTE — TELEPHONE ENCOUNTER
Attempted to call, pt was on another call, will try back.    Davina Spencer RN  Rheumatology Clinic

## 2020-06-10 NOTE — TELEPHONE ENCOUNTER
Patient Call: General    Reason for call: would like a return call to discuss his blood thinner prior to his 6/19/20 right heart Cath.     Call back needed? Yes    Return Call Needed  Same as documented in contacts section  When to return call?: Same day: Route High Priority

## 2020-06-10 NOTE — TELEPHONE ENCOUNTER
" COLCHICINE 0.6 MG TABLET   Last Written Prescription Date:  2/27/20  Last Fill Quantity: 90,   # refills: 0  Last Office Visit : 12/26/2018  Future Office visit:  None    Denied refill request because:  4/23/20:per Dr Godinez: last colchicine request>\"Last seen in1 2/2018, please ask him to contact PCP for that.\"    MADISYN ANDERS [phone]    "

## 2020-06-10 NOTE — PROGRESS NOTES
Patient Name: Danielito Chu  : 1955  Age: 65 year old  MRN: [id number]  Date of Initial Social Work Evaluation: 10/23/2018    Patient on Heart transplant wait list. Received LVAD in 2018. Telephone visit today in order to update psychosocial assessment.      Presenting Information   Living [place]: [place] to upper level and many stairs [place] to the [place]. Also owns a home in [place]  If not local, plans for short term stay:  Stayed in [address] with family during VAD implant. Will need another apartment during heart transplant.  Previous Functional Status: Independent with ADLS. Is able to drive and complete household chores  Cultural/Language/Spiritual Considerations: None identified    Support System  Primary Support Person Sister-Fani and Girlfriend-Yanna  Other support:  Other Siblings  Plan for support in immediate post-transplant period: Sister and GF will provide 24-hour care post-discharge after heart transplant    Health Care Directive  Decision Maker: Patient currently able to make decisions  Alternate Decision Maker: Has identified Fani as decision-maker  Health Care Directive: Has been given education and blank documents in the past. Reports he completed one during LVAD stay. However, no current document on file    Mental Health/Coping:   History of Mental Health: Denies any history of current issues with MH  History of Chemical Health: Occasional Marijuana use in the past, but denies any current use. Hopefully, continues to get tox screenings  Current status: N/A  Coping: Good coping skills  Services Needed/Recommended: None identified    Financial   Income: Receives long-term disability through previous employer and recently started to receive SSDI  Impact of LVAD on income: Isn't able to work, but able to afford monthly expenses, medications and supplies  Insurance and medication coverage: [employer]  (COBRA)  Financial concerns: No concerns voices by patient  Resources needed: None identified    Assessment and recommendations and plan:  Reviewed transplant education (Medicare, rehabilitation, donor issues, community/financial resources, and psych/family adjustment) as well as psychosocial risks of transplant. Provided education on potential costs of medications post-transplant, as well as the need for 24-hour care for first 30 days post-discharge, Also discussed Medicare coverage of transplant medications, and post-transplant lodging options. Patient seemed to process information well. Appeared well informed, motivated, and able to follow post transplant requirements. Behavior was appropriate during interview. Has adequate income and insurance coverage. Adequate social support. No major contraindications noted for transplant. At this time, patient appears to understand the risks and benefits of transplant.

## 2020-06-11 NOTE — PROGRESS NOTES
Fabiano called to ask if he needs to do anything special with his INR for his 6/19 RHC. I told him if his INR was within his goal range, the RHC can happen.    I told Fabiano his cardiology appointment was being converted to virtual with Dr. Watkins on 6/19. After his RHC in the hospital and [place] [place] [facility], the virtual visit will happen.    Fabiano would like to sign the consent for the Hepatitis C protocol. I asked him to call his transplant coordinator with any questions about giving consent for the Hep C protocol.

## 2020-06-16 NOTE — TELEPHONE ENCOUNTER
[facility] Call Center    Phone Message    May a detailed message be left on voicemail: yes     Reason for Call: Other: Patient called in an stated that he would like to speak with someone regarding his refill for   colchicine (COLCRYS) 0.6 MG tablet he has an appointment in July with . Please follow up with the patient asap. Thank you.      Action Taken: Message routed to:  [facility] rheum    Travel Screening: Not Applicable                                                                         This patient was evaluated for sepsis.  At this time, a diagnosis of sepsis is not supported by the overall clinical picture.

## 2020-06-17 NOTE — TELEPHONE ENCOUNTER
Last Clinic Visit: 12/26/2018  [facility]  Future Visit: 7/16/2020    Lab(s) - Uric Acid past due.  Mary refill 30 days (qty sufficient thru future visit) and FYI to Rheumatology nurse.

## 2020-06-18 NOTE — PROGRESS NOTES
ANTICOAGULATION FOLLOW-UP CLINIC VISIT    Patient Name:  Daneilito Chu  Date:  2020  Contact Type:  Telephone    SUBJECTIVE:  Patient Findings     Comments:   Fabiano had an INR done 2020, found the result today in Care Everywhere.  Fabiano reports he is trying to eat less, trying to lose weight.  He has a RHC scheduled 2020 and his INR should be therapeutic(per Epic note dated 2020).          Clinical Outcomes     Comments:   Fabiano had an INR done 2020, found the result today in Care Everywhere.  Fabiano reports he is trying to eat less, trying to lose weight.  He has a RHC scheduled 2020 and his INR should be therapeutic(per Epic note dated 2020).             OBJECTIVE    No results for input(s): INR, [id number], HDTLQH71STAM, 2AGN, F2 in the last 168 hours.    ASSESSMENT / PLAN  INR assessment THER    Recheck INR In: 3 WEEKS    INR Location Outside lab      Anticoagulation Summary  As of 2020    INR goal:   2.0-3.0   TTR:   81.8 % (11.9 mo)   INR used for dosin.1 (2020)   Warfarin maintenance plan:   2 mg (2 mg x 1) every day   Full warfarin instructions:   2 mg every day   Weekly warfarin total:   14 mg   Plan last modified:   Bri Becerril RN (3/18/2020)   Next INR check:   2020   Priority:   Critical   Target end date:   Indefinite    Indications    LVAD (left ventricular assist device) present (H) [Z95.811]  Long term (current) use of anticoagulants [Z79.01]  Chronic systolic congestive heart failure (H) [I50.22]             Anticoagulation Episode Summary     INR check location:       Preferred lab:       Send INR reminders to:   [facility]    Comments:   [place] [facility] [facility] [phone]/Fax [phone]  LVAD plced 18 HM 3 ASA 81mg Daily Jantoven 2mg tabs Lab [phone]  Call pt at [phone] Emphasize next INR date with pt++Send email each time++  Pt is home .      Anticoagulation Care Providers      Provider Role Specialty Phone number    Lorena Watkins MD Referring Cardiology [phone]            See the Encounter Report to view Anticoagulation Flowsheet and Dosing Calendar (Go to Encounters tab in chart review, and find the Anticoagulation Therapy Visit)    Spoke with Fabiano.  He will recheck INR 6/30/2020 to make sure he is in range for his RHC on 7/2/2020.     Patient had LVAD placed on:   12/5/18  Type of LVAD: HM 3  Patient's current Aspirin dose: 81 mg daily  LVAD Protocol followed: Yes     Jeanette Bryant RN

## 2020-07-01 NOTE — PROGRESS NOTES
SARS CoV RNA, RT PCR Not Detected on 6/29/2020 at [facility] - see Care Everywhere

## 2020-07-01 NOTE — TELEPHONE ENCOUNTER
Left voicemail for patient to complete Travel Screen for Cardiac Cath Lab appointment on 7/2 and inform patient of updated Visitor Policy.       Covid Negative

## 2020-07-02 NOTE — IP AVS SNAPSHOT
MRN:[id number]                      After Visit Summary   7/2/2020    Danielito Chu    MRN: [id number]           Visit Information        Department      7/2/2020 11:30 AM [facility], [place],  Heart Cath Lab          Review of your medicines      UNREVIEWED medicines. Ask your doctor about these medicines       Dose / Directions   allopurinol 100 MG tablet  Commonly known as:  ZYLOPRIM  Used for:  Acute gout of right foot, unspecified cause      Dose:  200 mg  Take 2 tablets (200 mg) by mouth daily  Quantity:  180 tablet  Refills:  1     amiodarone 200 MG tablet  Commonly known as:  PACERONE      Dose:  200 mg  Take 1 tablet (200 mg) by mouth daily  Quantity:  90 tablet  Refills:  3     amoxicillin 500 MG capsule  Commonly known as:  AMOXIL      Dose:  2,000 mg  Take 4 capsules (2,000 mg) by mouth once as needed (Take 4 capsules (2000mg), one hour before any dental cleanings or procedures)  Quantity:  4 capsule  Refills:  0     aspirin 81 MG chewable tablet  Commonly known as:  ASA  Used for:  Acute on chronic systolic heart failure (H)      Dose:  81 mg  Take 1 tablet (81 mg) by mouth daily  Quantity:  120 tablet  Refills:  0     bumetanide 1 MG tablet  Commonly known as:  BUMEX      Dose:  2 mg  Take 2 tablets (2 mg) by mouth 2 times daily  Quantity:  360 tablet  Refills:  3     colchicine 0.6 MG tablet  Commonly known as:  Colcrys  Used for:  Acute gout of right foot, unspecified cause, Acute on chronic systolic heart failure (H)      Dose:  0.6 mg  Take 1 tablet (0.6 mg) by mouth daily  Quantity:  30 tablet  Refills:  0     enoxaparin ANTICOAGULANT 100 MG/ML syringe  Commonly known as:  LOVENOX      Inject 100 mg (1 ml) every 12 hours as directed by the Anticoagulation Clinic.  Quantity:  10 Syringe  Refills:  1     losartan 25 MG tablet  Commonly known as:  COZAAR  Used for:  Acute on chronic systolic heart failure (H)      Dose:  25 mg  Take 1 tablet (25 mg) by mouth daily  Quantity:  90  tablet  Refills:  3     metoprolol succinate ER 25 MG 24 hr tablet  Commonly known as:  Toprol XL      Dose:  37.5 mg  Take 1.5 tablets (37.5 mg) by mouth daily  Quantity:  90 tablet  Refills:  3     potassium chloride ER 10 MEQ CR tablet  Commonly known as:  KLOR-CON M      Dose:  20 mEq  Take 2 tablets (20 mEq) by mouth 2 times daily  Quantity:  120 tablet  Refills:  11     spironolactone 25 MG tablet  Commonly known as:  ALDACTONE      Dose:  37.5 mg  TAKE 1.5 TABLETS (37.5 MG) BY MOUTH DAILY  Quantity:  135 tablet  Refills:  3     tacrolimus 0.1 % external ointment  Commonly known as:  PROTOPIC  Used for:  Rash of penis      Apply topically 2 times daily  Quantity:  30 g  Refills:  3     traZODone 100 MG tablet  Commonly known as:  DESYREL  Used for:  Insomnia, unspecified type      TAKE 1 TABLET BY MOUTH EVERY DAY AT BEDTIME AS NEEDED FOR SLEEP  Quantity:  30 tablet  Refills:  1     vitamin B1 100 MG tablet  Commonly known as:  THIAMINE  Used for:  Acute on chronic systolic heart failure (H)      Dose:  100 mg  Take 1 tablet (100 mg) by mouth daily  Quantity:  30 tablet  Refills:  0     warfarin ANTICOAGULANT 2 MG tablet  Commonly known as:  COUMADIN      Take as directed. If you are unsure how to take this medication, talk to your nurse or doctor.  Original instructions:  TAKE 1 & 1/2 TABLET BY MOUTH EACH DAY AT 6PM  Quantity:  135 tablet  Refills:  3        CONTINUE these medicines which have NOT CHANGED       Dose / Directions   Blood Glucose Monitor System w/Device Kit      three times a week  Refills:  0              Protect others around you: Learn how to safely use, store and throw away your medicines at www.disposemymeds.org.       Follow-ups after your visit       Your next 10 appointments already scheduled    Jul 02, 2020  3:30 PM CDT  (Arrive by 3:15 PM)  Ventricular Assist Device with  CARDIOLOGY ADVANCED [facility] ([facility]) [address] [place] [address]  [facility] [address]  [phone]      Jul 16, 2020 11:00 AM CDT  (Arrive by 10:45 AM)  Return Visit with Melody Nassar MD  [facility] Rheumatology ([facility]) [address] [place] [id number] [phone]      Sep 08, 2020 11:15 AM CDT  Lab with TAYLER LAB  [facility] Lab ([facility]) [address] [place] [address]  [facility] [id number] [phone]   Located in the [facility] at [address] [place], [place] [id number]. We're taking every precaution to prevent the spread of COVID-19. Our top priority is to protect and care for our patients, community members, and our staff. For that reason, we are suspending  services until further notice. Please self-park your vehicle before entering our facility. For [facility] [place] please use the [address] at [address] [place] [phone].        Sep 08, 2020 11:30 AM CDT  CARDIAC DEVICE CHECK - IN CLINIC with TAYLER CV DEVICE 1  [facility] Cardiac Services ([facility]) [address] [place] [address]  [facility] [id number] [phone]      Sep 08, 2020 12:30 PM CDT  (Arrive by 12:15 PM)  Ventricular Assist Device with Ai Maravilla PA-C  [facility] ([facility]) [address] [place] [id number]  [phone]      Dec 09, 2020 12:00 AM CST  CARDIAC DEVICE CHECK - REMOTE with TAYLER ICD REMOTE  [facility] ([facility]) [address] [place] [address]  [address]  [facility] [id number] [phone]         Care Instructions       Further instructions from your care team       [facility]                        Interventional Cardiology  Discharge Instructions   Post Right Heart Cath      AFTER YOU GO HOME:    DO drink plenty of fluids    DO resume your regular diet and medications unless otherwise instructed by your  "Primary Physician    Do Not scrub the procedure site vigorously    No lotion or powder to the puncture site for 3 days    CALL YOUR PRIMARY PHYSICIAN IF: You may resume all normal activity.  Monitor neck site for bleeding, swelling, or voice changes. If you notice bleeding or swelling immediately apply pressure to the site and call number below to speak with Cardiology Fellow.  If you experience any changes in your breathing you should call your doctor immediately or come to the closest Emergency Department.  Do not drive yourself.    ADDITIONAL INSTRUCTIONS: Medications: You are to resume all home medications including anticoagulation therapy unless otherwise advised by your primary cardiologist or nurse coordinator.    Follow Up: Per your primary cardiology team    If you have any questions or concerns regarding your procedure site please call [phone] at anytime and ask for Cardiology Fellow on call.  They are available 24 hours a day.  You may also contact the Cardiology Clinic after hours number at [phone].                                                       Telephone Numbers [phone] Monday-Friday 8:00 am to 4:30 pm    [phone] After 4:30 pm Monday-Friday, Weekends & Holidays  Ask for Interventional Cardiologist on call. Someone is on call 24 hours/day   [facility] toll free number [phone] Monday-Friday 8:00 am to 4:30 pm   [facility] Emergency Dept [phone]                   Additional Information About Your Visit       [employer] Information    [employer] lets you send messages to your doctor, view your test results, renew your prescriptions, schedule appointments and more. To sign up, go to [email or web address] . Click on \"Log in\" on the left side of the screen, which will take you to the Welcome page. Then click on \"Sign up Now\" on the right side of the page.     You will be asked to enter the access code listed below, as well as some personal information. Please follow the " directions to create your username and password.     Your access code is: [email or web address]  Expires: 2020  5:15 AM     Your access code will  in 60 days. If you need help or a new code, please call your   [facility] clinic or [phone].       Care EveryWhere ID    This is your Care EveryWhere ID. This could be used by other organizations to access your [facility] medical records  [id number]       Your Vitals Were  Most recent update: 2020  1:21 PM    Blood Pressure   104/75 (BP Location: Right arm, Cuff Size: Adult Regular)    Pulse   72    Temperature   98.2  F (36.8  C)    Pulse Oximetry   98%           Primary Care Provider Office Phone # Fax #    Sapphire Manjit Tate [phone]      Equal Access to Services    [facility]: Hadii andres gaston hadasho Soomaali, waaxda luqadaha, qaybta kaalmada adeaydenyada, aaron nelson . So [facility] [phone].    ATENCIÓN: Si habla español, tiene a stokes disposición servicios gratuitos de asistencia lingüística. Kam al [phone].    We comply with applicable federal and state civil rights laws, including the [place] Human Rights Act. We do not discriminate on the basis of race, color, creed, Rastafari, national origin, marital status, age, disability, sex, sexual orientation, or gender identity.       Thank you!    Thank you for choosing [facility] for your care. Our goal is always to provide you with excellent care. Hearing back from our patients is one way we can continue to improve our services. Please take a few minutes to complete the written survey that you may receive in the mail after you visit with us. Thank you!            Medication List      Medications          Morning Afternoon Evening Bedtime As Needed    Blood Glucose Monitor System w/Device Kit  INSTRUCTIONS:  three times a week                       ASK your doctor about these medications          Morning Afternoon Evening Bedtime As Needed     allopurinol 100 MG tablet  Also known as:  ZYLOPRIM  INSTRUCTIONS:  Take 2 tablets (200 mg) by mouth daily                     amiodarone 200 MG tablet  Also known as:  PACERONE  INSTRUCTIONS:  Take 1 tablet (200 mg) by mouth daily                     amoxicillin 500 MG capsule  Also known as:  AMOXIL  INSTRUCTIONS:  Take 4 capsules (2,000 mg) by mouth once as needed (Take 4 capsules (2000mg), one hour before any dental cleanings or procedures)                     aspirin 81 MG chewable tablet  Also known as:  ASA  INSTRUCTIONS:  Take 1 tablet (81 mg) by mouth daily                     bumetanide 1 MG tablet  Also known as:  BUMEX  INSTRUCTIONS:  Take 2 tablets (2 mg) by mouth 2 times daily                     colchicine 0.6 MG tablet  Also known as:  Colcrys  INSTRUCTIONS:  Take 1 tablet (0.6 mg) by mouth daily  Doctor's comments:  APPOINTMENT SCHEDULED WITH DR ADAME 7/16/2020.                     enoxaparin ANTICOAGULANT 100 MG/ML syringe  Also known as:  LOVENOX  INSTRUCTIONS:  Inject 100 mg (1 ml) every 12 hours as directed by the Anticoagulation Clinic.  Doctor's comments:  Dr. Watkins/Jasmyn NAZARIO per collaborative agreement with Coumadin Clinic. Please instruct patient on how to give lovenox injection.                     losartan 25 MG tablet  Also known as:  COZAAR  INSTRUCTIONS:  Take 1 tablet (25 mg) by mouth daily                     metoprolol succinate ER 25 MG 24 hr tablet  Also known as:  Toprol XL  INSTRUCTIONS:  Take 1.5 tablets (37.5 mg) by mouth daily                     potassium chloride ER 10 MEQ CR tablet  Also known as:  KLOR-CON M  INSTRUCTIONS:  Take 2 tablets (20 mEq) by mouth 2 times daily  Doctor's comments:  Dose change                     spironolactone 25 MG tablet  Also known as:  ALDACTONE  INSTRUCTIONS:  TAKE 1.5 TABLETS (37.5 MG) BY MOUTH DAILY                     tacrolimus 0.1 % external ointment  Also known as:  PROTOPIC  INSTRUCTIONS:  Apply topically 2 times daily                      traZODone 100 MG tablet  Also known as:  DESYREL  INSTRUCTIONS:  TAKE 1 TABLET BY MOUTH EVERY DAY AT BEDTIME AS NEEDED FOR SLEEP                     vitamin B1 100 MG tablet  Also known as:  THIAMINE  INSTRUCTIONS:  Take 1 tablet (100 mg) by mouth daily                     warfarin ANTICOAGULANT 2 MG tablet  Also known as:  COUMADIN  Take as directed. If you are unsure how to take this medication, talk to your nurse or doctor.  Original instructions:  TAKE 1 & 1/2 TABLET BY MOUTH EACH DAY AT 6PM

## 2020-07-02 NOTE — TELEPHONE ENCOUNTER
Transplant Update 07/01/20:  Topic:  Waitlist Evaluation    Spoke to patient, informed him of transplant labs that will need to be completed prior to RHC tomorrow. Discussed 6MW with patient, he is hesitant to do the 6MW with a mask and prefers not to have this scheduled right now if possible.  Will also need palliative care scheduled this month as was ordered and hasn't been scheduled.  Patient verbalized understanding of the plan and agrees to call Transplant Coordinator with any further questions or concerns.

## 2020-07-02 NOTE — NURSING NOTE
1). PUMP DATA  Primary controller serial number: [id number]     3:   Flow: 5.2 L/min,    Speed: 5500 RPMs,     PI: 3.2 ,  Power: 4.6 [place], Hct: 49.7     Primary controller   Back up battery: Patient use: 13, Replace in: 11  Months     Data downloaded: No   Equipment and driveline assessed for damage: Yes     Back up : Serial number: [id number]  Back up battery: Patient use: 11 Replace in: 10  Months  Programmed settings identical to the settings on the primary controller : N/A      Education complete: Yes   Charge the BACKUP controller s backup battery every 6 months  Perform a self test on BACKUP every 6 months  Change the MPU s batteries every 6 months:Yes        2). ALARMS  Alarms reported by patient since last pump evaluation: No  Alarms or other finding noted during pump data history and alarm download: No    Action Taken:  Reviewed data with patient: No      3). DRESSING CHANGE / DRIVELINE ASSESSMENT  Dressing change completed today: No  Appearance of Driveline site: WNL per patient, weekly dressing intact    Driveline stabilization: Method: [employer]  [ Teaching reinforced on need for stabilization of Driveline. ]    4).Pt. Education  D:  Pt education provided.  I:  Educated on MyChart  1.  How to message VAD Coordinator (send to MD but address to VAD Coordinator)  2. How to send photo via My Chart  3. When to use MyChart vs Call VAD Coordinator on Call.    A:  Pt verbalized understanding.  Pt does not want to sign up for MY Chart.    P:  VAD Coordinator available for questions or concerns.  Will continue VAD education.

## 2020-07-02 NOTE — PROGRESS NOTES
Pt arrived on 2a post RHC. Right neck c/d/i. No pain. Discharge instructions reviewed, copy given to pt. Pt discharged to clinic via w/c pushed by [facility].

## 2020-07-02 NOTE — LETTER
7/2/2020      RE: Danielito Chu  [address] [place] [address]       Dear Colleague,    Thank you for the opportunity to participate in the care of your patient, Danielito Chu, at the [facility] HEART [facility] acute [facility]. Please see a copy of my visit note below.        SUBJECTIVE:  Mr. Danielito Chu is a 63 yr old male with a history of NICM (LVEF at dong of 15%) s/p ICD (4/2017),  who underwent HM3 LVAD implant on 12/5/18 as DT (some history of [facility] smoking, liver disease), now BTT listed status 4 and now comes in for outpatient followup.     He has been doing well at home. He is able to to do yard work, gardening and house chores without any issues. He is attempting to lose weight and has been cutting calories. He is down 3lbs. Has occasional palpitations, but these are rare and does not have symptoms associated to them. He also states that these have significantly improved since starting on amiodarone.    He has noticed clear fluid discharge from drive line site, but no erythema, pain or purulent fluid.     He denies fever, chills, night sweats, headaches, vision changes, sore throat, cough, chest pain, shortness of breath, abdominal pain, nausea, vomiting, diarrhea, constipation, hematochezia, melena, dysuria, hematuria, presyncope, syncope, orthopnea or PND. Has not sick contacts or recent travel. No LVAD alarms reported.        Current Outpatient Medications   Medication     allopurinol (ZYLOPRIM) 100 MG tablet     amiodarone (PACERONE) 200 MG tablet     amoxicillin (AMOXIL) 500 MG capsule     aspirin (ASA) 81 MG chewable tablet     Blood Glucose Monitoring Suppl (BLOOD GLUCOSE MONITOR SYSTEM) w/Device KIT     bumetanide (BUMEX) 1 MG tablet     colchicine (COLCRYS) 0.6 MG tablet     enoxaparin (LOVENOX) 100 MG/ML syringe     losartan (COZAAR) 25 MG tablet     metoprolol succinate ER (TOPROL XL) 25 MG 24 hr tablet     potassium  "chloride ER (K-DUR/KLOR-CON M) 10 MEQ CR tablet     spironolactone (ALDACTONE) 25 MG tablet     tacrolimus (PROTOPIC) 0.1 % external ointment     traZODone (DESYREL) 100 MG tablet     vitamin B1 (THIAMINE) 100 MG tablet     warfarin ANTICOAGULANT (COUMADIN) 2 MG tablet     No current facility-administered medications for this visit.         ROS:   Constitutional, neuro, ENT, endocrine, pulmonary, cardiac, gastrointestinal, genitourinary, musculoskeletal, integument and psychiatric systems are negative, except as otherwise noted.    OBJECTIVE:    BP (!) 80/0 (BP Location: Right arm, Patient Position: Sitting, Cuff Size: Adult Regular)   Pulse 82   Ht 1.765 m (5' 9.5\")   Wt 107.5 kg (237 lb)   SpO2 97%   BMI 34.50 kg/m     Wt Readings from Last 1 Encounters:   07/02/20 107.5 kg (237 lb)       GENERAL APPEARANCE: healthy, alert and no distress     EYES: EOMI,  PERRL     HENT: ear canals and TM's normal and nose and mouth without ulcers or lesions     NECK: no adenopathy, no asymmetry, masses, or scars and thyroid normal to palpation     RESP: lungs clear to auscultation - no rales, rhonchi or wheezes     CV: machine hum, no audible murmur, click or rub     ABDOMEN:  soft, nontender, no HSM or masses and bowel sounds normal     MS: extremities normal- no gross deformities noted, no evidence of inflammation in joints, FROM in all extremities.     SKIN: no suspicious lesions or rashes     NEURO: Normal strength and tone, sensory exam grossly normal, mentation intact and speech normal     PSYCH: mentation appears normal. and affect normal/bright     LYMPHATICS: No cervical adenopathy    Results for orders placed or performed during the hospital encounter of 07/02/20   Cardiac Catheterization     Status: None (Preliminary result)    Narrative      Right sided filling pressures are mildly elevated.    Mild elevated Pulmonary Hypertension.    Left sided filling pressures are normal.    Reduced cardiac output level.    " Hemodynamic data has been modified in Epic per physician review.      Results for orders placed or performed in visit on 07/02/20   CBC with platelets     Status: Abnormal   Result Value Ref Range    WBC 10.3 4.0 - 11.0 10e9/L    RBC Count 5.09 4.4 - 5.9 10e12/L    Hemoglobin 16.9 13.3 - 17.7 g/dL    Hematocrit 49.7 40.0 - 53.0 %    MCV 98 78 - 100 fl    MCH 33.2 (H) 26.5 - 33.0 pg    MCHC 34.0 31.5 - 36.5 g/dL    RDW 13.6 10.0 - 15.0 %    Platelet Count 320 150 - 450 10e9/L   Comprehensive metabolic panel     Status: Abnormal   Result Value Ref Range    Sodium 134 133 - 144 mmol/L    Potassium 4.0 3.4 - 5.3 mmol/L    Chloride 103 94 - 109 mmol/L    Carbon Dioxide 27 20 - 32 mmol/L    Anion Gap 4 3 - 14 mmol/L    Glucose 95 70 - 99 mg/dL    Urea Nitrogen 18 7 - 30 mg/dL    Creatinine 1.52 (H) 0.66 - 1.25 mg/dL    GFR Estimate 48 (L) >60 mL/min/[1.73_m2]    GFR Estimate If Black 55 (L) >60 mL/min/[1.73_m2]    Calcium 9.1 8.5 - 10.1 mg/dL    Bilirubin Total 0.8 0.2 - 1.3 mg/dL    Albumin 4.0 3.4 - 5.0 g/dL    Protein Total 8.5 6.8 - 8.8 g/dL    Alkaline Phosphatase 98 40 - 150 U/L    ALT 46 0 - 70 U/L    AST 34 0 - 45 U/L   Hemoglobin A1c     Status: None   Result Value Ref Range    Hemoglobin A1C 5.5 0 - 5.6 %   EBV Capsid Antibody IgG     Status: Abnormal   Result Value Ref Range    EBV Capsid Antibody IgG >8.0 (H) 0.0 - 0.8 AI   HIV Antigen Antibody Combo Pretransplant     Status: None   Result Value Ref Range    HIV Antigen Antibody Combo Pretransplant Nonreactive NR^Nonreactive   INR     Status: Abnormal   Result Value Ref Range    INR 2.43 (H) 0.86 - 1.14   Lactate Dehydrogenase     Status: Abnormal   Result Value Ref Range    Lactate Dehydrogenase 234 (H) 85 - 227 U/L   Magnesium     Status: None   Result Value Ref Range    Magnesium 2.3 1.6 - 2.3 mg/dL   Treponema Abs w Reflex to RPR and Titer     Status: None   Result Value Ref Range    Treponema Antibodies Nonreactive NR^Nonreactive   D dimer quantitative      Status: Abnormal   Result Value Ref Range    D Dimer 1.0 (H) 0.0 - 0.50 ug/ml FEU         Flow: 5.2 L/min,    Speed: 5500 RPMs,     PI: 3.2 ,  Power: 4.6 [place], Hct: 49.7     [facility] 07/02/20       ASSESSMENT/PLAN:  Mr. Danielito Chu is a 63 yr old male with a history of NICM (LVEF at dong of 15%) s/p ICD (4/2017),  who underwent HM3 LVAD implant on 12/5/18 as DT (some history of [facility] smoking, liver disease), now BTT listed status 4 and now comes in for outpatient followup.      # Chronic systolic heart failure secondary to NICM s/p 3 LVAD as BTT on 12/5/2018, listed as status 4.  Stage D. NYHA Class II. Patient signed consent form today accepting HCV+ hearts. This was faxed to transplant coordinators and team.     Fluid status euvolemic.  ACEi/ARB Losartan 25 mg daily  BB Metoprolol 37.5 mg daily  Aldosterone antagonist Spironolatone 37.5 mg daily  SCD prophylaxis ICD  NSAID use: Contraindicated  BP: Map is 80, goal is 65-85  LDH trends: 234, stable  Anticoagulation: warfarin INR goal 2-3 . 2.43 today  Antiplatelet: Aspirin 81 mg     VAD interrogation today: VAD interrogation reviewed with VAD coordinator. Agree with findings.  Due to RHC, increased RPM to 5600 and will follow clinically     # Hypertension: Controlled.  Continue metoprolol XL and losartan as above.     # NSVT  # Atrial tachycardia: Controlled with amiodarone and above dose of metoprolol     # Obesity BMI of 34.5 today. He is attempting to lose weight; discussed importance in continue to work on lowering BMI.    #CKD stage II  - Cr slightly up today, unclear of cause; perhaps increased in LVAD speed might lead to more renal perfusion and improvement in Cr    Amos Beach MD PhD  Cardiology Fellow       Pt was seen and plan of care discussed with Dr. Araujo.       I have reviewed today's vital signs, notes, medications, labs and imaging. I have also seen and examined the patient and agree with the findings and plan as outlined above.    Ashley PITTS  MD Van  Section Head - Advanced Heart Failure, Transplantation and Mechanical Circulatory Support  Director - Adult Congenital and Cardiovascular Genetics Center  Associate Professor of Medicine, [facility]        Please do not hesitate to contact me if you have any questions/concerns.     Sincerely,      Cardiology Advanced DOD

## 2020-07-02 NOTE — PROGRESS NOTES
[facility]   Interventional Cardiology        Consenting/Education for Cardiac Cath Lab Procedure: Right Heart Catheterization     Patient understands we would like to perform .Right Heart Catheterization. This procedure will be performed by Dr. Cordoba.    The patient understands the following:     Right Heart Catheterization: A fine tube (catheter) is put into the vein of the groin/neck.  It is carefully passed along until it reaches the heart and then goes up into the blood vessels of the lungs. This is done to measure a variety of pressures in your heart and can tell us how well the heart is filling and emptying, as well as monitor fluid status.     No sedation is planned for this procedure. Patient also understands risks and complications of the procedure which include, but are not limited to bruising/swelling around the incision site, infection, bleeding, allergic reaction to local anesthetic, air embolism, arterial puncture, stroke, heart attack.       Patient verbalized understanding of risks and benefits and has elected to proceed with the procedure or procedures listed above.    JOSE A Tamez, CNP  [facility] Cardiology

## 2020-07-02 NOTE — PATIENT INSTRUCTIONS
Medications:  1. No changes    Follow-up:  1. WE changed your VAD Speed up from 5500 to 5600. Let us know if you experience any changes in your health (swelling, shortness of breath, light headedness, fatigue)  2. We will see you back in September!    Instructions:  1. We talked about the Hep C protocol today with Dr. Araujo. If you ever have questions later on, don't hesitate to reach out and ask.    Page the VAD Coordinator on call if you gain more than 3 lb in a day or 5 in a week. Please also page if you feel unwell or have alarms.     Great to see you in clinic today. To Page the VAD Coordinator on call, dial [phone] option #[phone] and ask to speak to the VAD coordinator on call.

## 2020-07-02 NOTE — IP AVS SNAPSHOT
[facility], [facility],  Heart Cath Lab  [address] [address]  Phone:  [phone]                                    After Visit Summary   7/2/2020    Danielito Chu    MRN: [id number]           After Visit Summary Signature Page    I have received my discharge instructions, and my questions have been answered. I have discussed any challenges I see with this plan with the nurse or doctor.    ..........................................................................................................................................  Patient/Patient Representative Signature      ..........................................................................................................................................  Patient Representative Print Name and Relationship to Patient    ..................................................               ................................................  Date                                   Time    ..........................................................................................................................................  Reviewed by Signature/Title    ...................................................              ..............................................  Date                                               Time          22EPIC Rev 08/18

## 2020-07-02 NOTE — DISCHARGE INSTRUCTIONS
[facility]                        Interventional Cardiology  Discharge Instructions   Post Right Heart Cath      AFTER YOU GO HOME:    DO drink plenty of fluids    DO resume your regular diet and medications unless otherwise instructed by your Primary Physician    Do Not scrub the procedure site vigorously    No lotion or powder to the puncture site for 3 days    CALL YOUR PRIMARY PHYSICIAN IF: You may resume all normal activity.  Monitor neck site for bleeding, swelling, or voice changes. If you notice bleeding or swelling immediately apply pressure to the site and call number below to speak with Cardiology Fellow.  If you experience any changes in your breathing you should call your doctor immediately or come to the closest Emergency Department.  Do not drive yourself.    ADDITIONAL INSTRUCTIONS: Medications: You are to resume all home medications including anticoagulation therapy unless otherwise advised by your primary cardiologist or nurse coordinator.    Follow Up: Per your primary cardiology team    If you have any questions or concerns regarding your procedure site please call [phone] at anytime and ask for Cardiology Fellow on call.  They are available 24 hours a day.  You may also contact the Cardiology Clinic after hours number at [phone].                                                       Telephone Numbers [phone] Monday-Friday 8:00 am to 4:30 pm    [phone] After 4:30 pm Monday-Friday, Weekends & Holidays  Ask for Interventional Cardiologist on call. Someone is on call 24 hours/day   [facility] toll free number [phone] Monday-Friday 8:00 am to 4:30 pm   [facility] Emergency Dept [phone]

## 2020-07-02 NOTE — PROGRESS NOTES
7/2/20 Patient is currently admitted to the Cath lab.  Right Heart Cath scheduled for 3:30.  INR was 2.43 on 7/2 pre procedure.  This writer did not contact patient.  Will await discharge summary.    Fady Ibanez RN

## 2020-07-02 NOTE — LETTER
PRE-HEART TRANSPLANT WAITLIST APPOINTMENTS    Patient : Danielito Chu  MR#: [id number]  Coordinator: Ashley RICHTER [phone]  : Justina [phone]  Dates: July 24, 2020 & August 5, 2020        Day/Date:   Friday , July 24, 2020  Time Location Activity   11:30 AM [facility]  Pulmonary Function Lab  3rd floor Six-Minute Walk   12:00 PM [facility]  [address]; [facility] [place] [facility] [address]  Imaging and Lab Testing  1st floor Blood tests   12:15 PM [facility]  Imaging and Lab Testing  1st floor Abdominal Ultrasound  (Nothing to eat or drink after midnight)   1:15 PM [facility]  Imaging and Lab Testing  1st floor LUIS ALBERTO Doppler Ultrasound, No Exercise  (No caffeine or tobacco for 1 hour prior to exam.)   1:45 PM [facility]  Imaging and Lab Testing  1st floor Bilateral Lower Extremity Arterial Ultrasound  (You do not need to do anything special to prepare for your exam.)   2:45 PM [facility]  Imaging and Lab Testing  1st floor Bilateral Lower Extremity Venous Ultrasound  (You do not need to do anything special to prepare for your exam.)   4:00 PM [facility]  Imaging and Lab Testing  1st floor Head CT  (You do not need to do anything special to prepare for your exam.)                 Day/Date:   Wednesday, August 5, 2020  Time Location Activity   12:45 fPM Video Visit--Please review Video Visit Instructions sent via [employer] Appointment with Dr. Stark,   Palliative Care

## 2020-07-02 NOTE — PROGRESS NOTES
SUBJECTIVE:  Mr. Danielito Chu is a 63 yr old male with a history of NICM (LVEF at dong of 15%) s/p ICD (4/2017),  who underwent HM3 LVAD implant on 12/5/18 as DT (some history of mariajuana smoking, liver disease), now BTT listed status 4 and now comes in for outpatient followup.     He has been doing well at home. He is able to to do yard work, gardening and house chores without any issues. He is attempting to lose weight and has been cutting calories. He is down 3lbs. Has occasional palpitations, but these are rare and does not have symptoms associated to them. He also states that these have significantly improved since starting on amiodarone.    He has noticed clear fluid discharge from drive line site, but no erythema, pain or purulent fluid.     He denies fever, chills, night sweats, headaches, vision changes, sore throat, cough, chest pain, shortness of breath, abdominal pain, nausea, vomiting, diarrhea, constipation, hematochezia, melena, dysuria, hematuria, presyncope, syncope, orthopnea or PND. Has not sick contacts or recent travel. No LVAD alarms reported.        Current Outpatient Medications   Medication     allopurinol (ZYLOPRIM) 100 MG tablet     amiodarone (PACERONE) 200 MG tablet     amoxicillin (AMOXIL) 500 MG capsule     aspirin (ASA) 81 MG chewable tablet     Blood Glucose Monitoring Suppl (BLOOD GLUCOSE MONITOR SYSTEM) w/Device KIT     bumetanide (BUMEX) 1 MG tablet     colchicine (COLCRYS) 0.6 MG tablet     enoxaparin (LOVENOX) 100 MG/ML syringe     losartan (COZAAR) 25 MG tablet     metoprolol succinate ER (TOPROL XL) 25 MG 24 hr tablet     potassium chloride ER (K-DUR/KLOR-CON M) 10 MEQ CR tablet     spironolactone (ALDACTONE) 25 MG tablet     tacrolimus (PROTOPIC) 0.1 % external ointment     traZODone (DESYREL) 100 MG tablet     vitamin B1 (THIAMINE) 100 MG tablet     warfarin ANTICOAGULANT (COUMADIN) 2 MG tablet     No current facility-administered medications for this visit.   "       ROS:   Constitutional, neuro, ENT, endocrine, pulmonary, cardiac, gastrointestinal, genitourinary, musculoskeletal, integument and psychiatric systems are negative, except as otherwise noted.    OBJECTIVE:    BP (!) 80/0 (BP Location: Right arm, Patient Position: Sitting, Cuff Size: Adult Regular)   Pulse 82   Ht 1.765 m (5' 9.5\")   Wt 107.5 kg (237 lb)   SpO2 97%   BMI 34.50 kg/m     Wt Readings from Last 1 Encounters:   07/02/20 107.5 kg (237 lb)       GENERAL APPEARANCE: healthy, alert and no distress     EYES: EOMI,  PERRL     HENT: ear canals and TM's normal and nose and mouth without ulcers or lesions     NECK: no adenopathy, no asymmetry, masses, or scars and thyroid normal to palpation     RESP: lungs clear to auscultation - no rales, rhonchi or wheezes     CV: machine hum, no audible murmur, click or rub     ABDOMEN:  soft, nontender, no HSM or masses and bowel sounds normal     MS: extremities normal- no gross deformities noted, no evidence of inflammation in joints, FROM in all extremities.     SKIN: no suspicious lesions or rashes     NEURO: Normal strength and tone, sensory exam grossly normal, mentation intact and speech normal     PSYCH: mentation appears normal. and affect normal/bright     LYMPHATICS: No cervical adenopathy    Results for orders placed or performed during the hospital encounter of 07/02/20   Cardiac Catheterization     Status: None (Preliminary result)    Narrative      Right sided filling pressures are mildly elevated.    Mild elevated Pulmonary Hypertension.    Left sided filling pressures are normal.    Reduced cardiac output level.    Hemodynamic data has been modified in Epic per physician review.      Results for orders placed or performed in visit on 07/02/20   CBC with platelets     Status: Abnormal   Result Value Ref Range    WBC 10.3 4.0 - 11.0 10e9/L    RBC Count 5.09 4.4 - 5.9 10e12/L    Hemoglobin 16.9 13.3 - 17.7 g/dL    Hematocrit 49.7 40.0 - 53.0 %    MCV " 98 78 - 100 fl    MCH 33.2 (H) 26.5 - 33.0 pg    MCHC 34.0 31.5 - 36.5 g/dL    RDW 13.6 10.0 - 15.0 %    Platelet Count 320 150 - 450 10e9/L   Comprehensive metabolic panel     Status: Abnormal   Result Value Ref Range    Sodium 134 133 - 144 mmol/L    Potassium 4.0 3.4 - 5.3 mmol/L    Chloride 103 94 - 109 mmol/L    Carbon Dioxide 27 20 - 32 mmol/L    Anion Gap 4 3 - 14 mmol/L    Glucose 95 70 - 99 mg/dL    Urea Nitrogen 18 7 - 30 mg/dL    Creatinine 1.52 (H) 0.66 - 1.25 mg/dL    GFR Estimate 48 (L) >60 mL/min/[1.73_m2]    GFR Estimate If Black 55 (L) >60 mL/min/[1.73_m2]    Calcium 9.1 8.5 - 10.1 mg/dL    Bilirubin Total 0.8 0.2 - 1.3 mg/dL    Albumin 4.0 3.4 - 5.0 g/dL    Protein Total 8.5 6.8 - 8.8 g/dL    Alkaline Phosphatase 98 40 - 150 U/L    ALT 46 0 - 70 U/L    AST 34 0 - 45 U/L   Hemoglobin A1c     Status: None   Result Value Ref Range    Hemoglobin A1C 5.5 0 - 5.6 %   EBV Capsid Antibody IgG     Status: Abnormal   Result Value Ref Range    EBV Capsid Antibody IgG >8.0 (H) 0.0 - 0.8 AI   HIV Antigen Antibody Combo Pretransplant     Status: None   Result Value Ref Range    HIV Antigen Antibody Combo Pretransplant Nonreactive NR^Nonreactive   INR     Status: Abnormal   Result Value Ref Range    INR 2.43 (H) 0.86 - 1.14   Lactate Dehydrogenase     Status: Abnormal   Result Value Ref Range    Lactate Dehydrogenase 234 (H) 85 - 227 U/L   Magnesium     Status: None   Result Value Ref Range    Magnesium 2.3 1.6 - 2.3 mg/dL   Treponema Abs w Reflex to RPR and Titer     Status: None   Result Value Ref Range    Treponema Antibodies Nonreactive NR^Nonreactive   D dimer quantitative     Status: Abnormal   Result Value Ref Range    D Dimer 1.0 (H) 0.0 - 0.50 ug/ml FEU         Flow: 5.2 L/min,    Speed: 5500 RPMs,     PI: 3.2 ,  Power: 4.6 [place], Hct: 49.7     [facility] 07/02/20       ASSESSMENT/PLAN:  Mr. Danielito Chu is a 63 yr old male with a history of NICM (LVEF at dong of 15%) s/p ICD (4/2017),  who underwent HM3 LVAD  implant on 12/5/18 as DT (some history of mariajuana smoking, liver disease), now BTT listed status 4 and now comes in for outpatient followup.      # Chronic systolic heart failure secondary to NICM s/p 3 LVAD as BTT on 12/5/2018, listed as status 4.  Stage D. NYHA Class II. Patient signed consent form today accepting HCV+ hearts. This was faxed to transplant coordinators and team.     Fluid status euvolemic.  ACEi/ARB Losartan 25 mg daily  BB Metoprolol 37.5 mg daily  Aldosterone antagonist Spironolatone 37.5 mg daily  SCD prophylaxis ICD  NSAID use: Contraindicated  BP: Map is 80, goal is 65-85  LDH trends: 234, stable  Anticoagulation: warfarin INR goal 2-3 . 2.43 today  Antiplatelet: Aspirin 81 mg     VAD interrogation today: VAD interrogation reviewed with VAD coordinator. Agree with findings.  Due to RHC, increased RPM to 5600 and will follow clinically     # Hypertension: Controlled.  Continue metoprolol XL and losartan as above.     # NSVT  # Atrial tachycardia: Controlled with amiodarone and above dose of metoprolol     # Obesity BMI of 34.5 today. He is attempting to lose weight; discussed importance in continue to work on lowering BMI.    #CKD stage II  - Cr slightly up today, unclear of cause; perhaps increased in LVAD speed might lead to more renal perfusion and improvement in Cr    Amos Beach MD PhD  Cardiology Fellow       Pt was seen and plan of care discussed with Dr. Araujo.       I have reviewed today's vital signs, notes, medications, labs and imaging. I have also seen and examined the patient and agree with the findings and plan as outlined above.    Ashley Araujo MD  Section Head - Advanced Heart Failure, Transplantation and Mechanical Circulatory Support  Director - Adult Congenital and Cardiovascular Genetics Center  Associate Professor of Medicine, [facility]

## 2020-07-03 NOTE — PROGRESS NOTES
ANTICOAGULATION  MANAGEMENT: Discharge Review    Danielito Chu chart reviewed for anticoagulation continuity of care    Outpatient surgery/procedure on 7/2 for RHC.    Discharge disposition: Home    Results:    Recent labs: (last 7 days)     07/02/20  1155   INR 2.43*     Anticoagulation inpatient management:     not applicable     Anticoagulation discharge instructions:     Warfarin dosing: next INR on 7/16   Bridging: No   INR goal change: No      Medication changes affecting anticoagulation: No    Additional factors affecting anticoagulation: No    Plan     Agree with dosing adjustment on discharge    Spoke with patient.    Anticoagulation Calendar updated    Lorena Kamara RN

## 2020-07-03 NOTE — PROGRESS NOTES
Called pt to review symptoms that were reported to VAD coordinator earlier today. Pt reports he is feeling ok. His lightheadedness seems to be associated with activity. He states he feels fine when he is sitting still and not doing anything, but when he tries to move he gets lightheaded. Pt reports having low po intake today, probably only about 12oz of fluid. He takes bumex 2mg BID. Reviewed labs from yesterday and cr was elevated. Pt may be dehydrated. Discussed with Dr. Araujo and made plan for pt skip bumex this afternoon and increase po fluid intake - he usually drinks 70-75oz daily, then starting 7/4 pt should decrease bumex to 1mg BID for 3 days. Pt should check in with VAD coordinator on Monday to see if we should continue this dosing.   Called pt to review plan. He had drank a few glasses of water since the last time we spoke and was starting to feel better. Pt verbalized understanding of the plan.

## 2020-07-03 NOTE — PROGRESS NOTES
UNOS and Waitlist updated with patient's most recent adjusted VAD weight, adjusted VAD weight =clinic weight minus 5lbs  BMI: 34.5  K.50

## 2020-07-03 NOTE — PROGRESS NOTES
Hepatitis C donors:  Yesterday during pt's visit with Dr Araujo, she introduced our Hepatitis C donor protocol.   Danielito was agreeable to receiving a Hep C donor and signed the consent for this, which has been forwarded to "[employer] for scanning into his EMR.  Changes were made to his UNOS profile as well.

## 2020-07-03 NOTE — PROGRESS NOTES
D: Pt paged VAD Coordinator on-call stating that he has been lightheaded today after VAD speed was increased yesterday in clinic.  I: VAD Coordinator called pt back. Pt stated that he's been lightheaded today and had 2 episodes where the room went dark and one where he had to kneel down. Pt had a RHC yesterday. RA 10, wedge 14, PA 30/19. After RHC, pt's speed was increased from 5500 to 5600 in clinic. No medications were changed. Pt denied SOB or swelling. Pt stated that he is bloated today and had one episode of loose stool. VAD numbers at time of call are speed 5600, flow 5.1, PI 2.5, power 4.6. Pt stated that lightheadedness seems to get worse with position changes. Writer notified Dr. Watkins. Per Dr. Watkins, pt's speed may need to be lowered back to 5500. Writer passed this off to weekend VAD Coordinator on-call. Notified Dr. Watkins that weekend on-call VAD Coordinator will work on this situation and start coordinating with patient starting now.  P: Weekend VAD Coordinator on-call will follow up with patient and Dr. Watkins today.

## 2020-07-06 NOTE — PROGRESS NOTES
D: I called Fabiano today to check in with him after his recent RHC, VAD speed increase, dizziness, and temporary bumex dose reduction.  Fabiano said he has felt pretty good over the weekend and is being extra cautious of dizziness.  I: I instructed Fabiano to get up from a lying down position or a sitting position slower to allow his fluid to equillibrate. With a faster RPM speed, he may experience more PI events and the speed may drop to the low speed limit to allow for filling of the LV.   I also told him to make sure he is drinking the same or slightly more liquids than usual. Fabiano has been taking a reduced dose of bumex 2mg daily over the weekend. I asked him to continue this until we see his lab results today or tomorrow.  A: Fabiano told me he doesn't have a problem with blood returning to his heart and his problem is his heart moving blood forward out of the heart. I told him that is true but for the VAD to work best, it needs enough blood returning from the lungs to the left atrium and then the left ventricle. When we turn up the speed, sometimes there is not enough blood coming to the VAD so it will drop the speed as needed to the lower speed limit. When the speed drops, he may feel dizzy.    PLAN: Fabiano will get his labs drawn today or tomorrow morning in [place]. We may need to go back down on his VAD speed or keep his diuretic at a lower dose.

## 2020-07-06 NOTE — LETTER
Patient Name: Danielito Chu   : 1955   Diagnosis/ICD-10: Heart Failure, unspecified I50.9; LVAD Z95.811   Requesting Physician: Dr. Lorena Watkins   Date of Request: 2020   Date to Draw 2020 or shortly therafter     **Please fax results to Edgardo Elias RN VAD Coord at [phone].                Call [phone] with Questions      ORDER CODE TESTS TAMEKA.VOL.   X CBASIC Na, K, Cl, CO2, Crea, BUN, Glu, Ca GG 0.5-1    BHEPAT Alb, AlkP, ALT, AST, BBil, TP GG 0.5-1    BLIP Chol, Trig, HDL, LDL GG 1-2    CCOMP Na, K, Cl, CO2, Crea, BUN, Glu, Ca, Alb, AlkP, ALT, AST, Bbil, TP,  GG 0.6-1    HGP CBC & Platelet P 0.3-1    HGDP CBC, Differential & Platelet P 0.3-1    PLT Platelet  P 0.3-1    INR INR B 2.7    PTT PTT B 2.7    LD Lactate Dehydrogenase GG 0.3-1    PHGB Plasma Hemoglobin GG 2-3    Pre-Albumin Pre-Albumin RG 1.0                  Signed,      Lorena Watkins MD  Heart Failure, Mechanical Circulatory Support and Transplant Cardiology   of Medicine,  Division of Cardiology, [facility]

## 2020-07-07 NOTE — PROGRESS NOTES
After his [facility] 7/3, in the [facility], Dr. Araujo increased Fabiano's VAD speed from 5500 to 5600rpm. Fabiano said he felt great but the next morning he had some dizzy spells. His 7/2 creat was elevated at 1.52. His weight was down a few pounds from baseline. Fabiano called the VAD coordinator on call on 7/3. Dr. Araujo told Fabiano to drop his Bumex in half to 2mg daily. Fabiano did this dosing over the weekend and felt much less dizzy. On Tuesday, 7/7 creat was baseline 1.10. He said his weight is back to his baseline of 238lbs. His current VAD numbers are 5600 / 5.0 flow / 2.6 PI / 4.7 pwr.    PLAN: Fabiano will stay on the Bumex 2mg daily. If he feels dizziness again he will call. If his weight starts to climb he will call.    A: need for speed change vs diuretic change    Fabiano also said he is not sleeping well. He has taken trazadone 50-100mg nightly for sleep in the past but stopped taking it when he said he always felt groggy in the morning. I suggested that he talk with his PCP Dr. Desouza about sleep. He said he'd get an appointment. I reminded him to always take his current med list with him when he goes to other providers. Likewise, he should tell us what other providers have prescribed for him.

## 2020-07-09 NOTE — NURSING NOTE
"Pt presented to clinic today for assessment of white power cable on VAD controller. On inspection, bend relief connectors are broken in 3 spots. This will leave the power cable vulnerable to severe twists and bends which can compromise the integrity of the internal wires, leading to power failures. Need to change controller today to prevent any power issues from arising.     Pt also reports continued dizziness and fatigue. Discussed pt's symptoms with Dr. Watkins prior to appt, and MD recommended decreasing speed to 5500.     1). PUMP DATA  Primary controller serial number: [id number]    HM 3:   Flow: 5.2 L/min,    Speed: 5600 RPMs,     PI: 2.6 ,  Power: 4.7 [place], Hct: not drawn today   **SPEED CHANGED: Flow: 5.1 L/min,    Speed: 5500 RPMs,     PI: 2.7 ,  Power: 4.4 [place],   DUE TO COMPROMISE IN BEND RELIEF OF WHITE POWER CABLE, CONTROLLER CHANGED (see above).   NEW CONTROLLER S/N [facility] [id number]    Primary controller      Data downloaded: Yes   Equipment and driveline assessed for damage: Yes     Education complete: Yes   Charge the BACKUP controller s backup battery every 6 months  Perform a self test on BACKUP every 6 months  Change the MPU s batteries every 6 months:Yes    2). ALARMS  Alarms reported by patient since last pump evaluation: No  Alarms or other finding noted during pump data history and alarm download: Yes - pt with several PI events, with PI ranging from 2.1-6.8. likely due to dehydration and/or speed too high. Speed changed in clinic today as above.   Noted low voltage and \"LOW VOLTAGE\" alarms that seem out of place for pt. Pt does not recall hearing any hazard alarms. Discussed equipment maintenance. Pt does not routinely clean battery and clip contacts and all batteries are due for recalibration. Sent waveforms to [employer] for review. Engineers reviewed waveforms and found that alarms were occurring at expected times.   Pt also admits that he sleeps on his batteries. Educated pt on the safety issue " of sleeping while on batteries, that he may sleep through alarms and lose power to his controller. Pt verbalized understanding.     Action Taken:  Reviewed data with patient: Yes

## 2020-07-09 NOTE — TELEPHONE ENCOUNTER
Spoke to patient regarding scheduling Heart Waitlist Appointments. Pt wants to call Rheumatology department to see if his return appointment with them will be a Video Visit or in person. Patient coming from far away so would like all in person visits and testing to be done on the same day. Patient will call me back after he speaks to Rheumatology.

## 2020-07-09 NOTE — PATIENT INSTRUCTIONS
We discussed eqipment maintenance in clinic today. Please clean the battery and clip contacts weekly and the battery charger contacts monthly with rubbing alcohol. Allow contacts to dry completely before using.   Recalibrate your batteries when the battery charger indicates that it is due, as I showed you. You can only recalibrate one battery at a time.

## 2020-07-13 NOTE — TELEPHONE ENCOUNTER
Patient Call: Voicemail  Date/Time: 7/13/20 @0906  Reason for call: Patient left message re: being gone for a while and put on hold on transplant list.

## 2020-07-14 NOTE — PROGRESS NOTES
Fabiano said with the lower speed he has not had any dizzy spells. His weight is down a pound to 236.    He is considering a road trip to [place] to arrange for some work do be done on his house there. Fabiano has the contact information for a local 3 center there. I will send him some 3 centers along his route through [place], [place], [place], and [place].    He said he will let the transplant coordinators know when he is planning to be in [place] so they can change his transplant status.

## 2020-07-16 NOTE — PROGRESS NOTES
Rheumatology Follow up Virtual Visit Note    Reason for referral: Erosive crystal proven gout      Last seen: 12/26/2018    DOS: 7/16/2020    (this is a video visit due to COVID-19 outbreak), patient agreed         HPI:    Danielito Chu is a 63 year old male. He is here for initial visit, f/u hospitalization and to establish care for his gout. He lives far away and has to coordinate his visits to the [place].    He was initially seen for gout consult on 12/13/2018, L knee was injected with steroid and lidocaine and he received anakinra for gout mx. He was started on allopurinol and colcrys. Tolertaes meds well.Had another ER visit on 12/21 when he received one dose of prednisone 40 mg every day for gout flare of R foot.    Today, is feeling well, has mild residual tenderness of the R foot.    Today 7/16/2020: Lost follow up since initial visit in 12/2018 but has not had any gout flares since then. Reports  Being told to make this appointment to be able to get refills on meds.      Past Medical History:   Diagnosis Date     Acute systolic congestive heart failure (H) 4/5/2017     Diabetes (H)      HTN (hypertension)      Hyperlipidemia      Liver disease      LVAD (left ventricular assist device) present (H)     HM 3 12/18     Marijuana abuse      Mitral regurgitation      Nocturnal oxygen desaturation 3/29/2018     Nonischemic cardiomyopathy (H) 4/5/2017     SHIRLEY (obstructive sleep apnea)      Pacemaker 04/07/2017    ICD 4/7/17     Situational mixed anxiety and depressive disorder      Past Surgical History:   Procedure Laterality Date     CV RIGHT HEART CATH N/A 5/9/2019    Procedure: CV RIGHT HEART CATH;  Surgeon: Jules Allan MD;  Location:  HEART CARDIAC CATH LAB     ESOPHAGOSCOPY, GASTROSCOPY, DUODENOSCOPY (EGD), COMBINED N/A 11/21/2018    Procedure: COMBINED ESOPHAGOSCOPY, GASTROSCOPY, DUODENOSCOPY (EGD);  Surgeon: Candido Mendez MD;  Location:  GI     IMPLANT IMPLANTABLE CARDIOVERTER  DEFIBRILLATOR       INSERT VENTRICULAR ASSIST DEVICE LEFT (HEARTMATE II/III) MINIMALLY INVASIVE N/A 2018    Procedure: Partial Median Sternotomy, Left Thoracotomy, Minimally Invasive Left Ventricular Assist Device Placement (Heartmate III), On Cardiopulmonary Bypass;  Surgeon: Andrei Silva MD;  Location:  OR     Family History   Problem Relation Age of Onset     Heart Failure Father          at age 86     Heart Failure Paternal Uncle          at 66      Myocardial Infarction Paternal Uncle          at age 62     Coronary Artery Disease Mother          during CABG at age 41     Social History     Socioeconomic History     Marital status: Single     Spouse name: Not on file     Number of children: Not on file     Years of education: Not on file     Highest education level: Not on file   Occupational History     Not on file   Social Needs     Financial resource strain: Not on file     Food insecurity     Worry: Not on file     Inability: Not on file     Transportation needs     Medical: Not on file     Non-medical: Not on file   Tobacco Use     Smoking status: Former Smoker     Packs/day: 0.30     Years: 20.00     Pack years: 6.00     Types: Cigarettes     Smokeless tobacco: Never Used     Tobacco comment: quit 3/2017   Substance and Sexual Activity     Alcohol use: No     Frequency: Never     Comment: rare     Drug use: No     Sexual activity: Not on file   Lifestyle     Physical activity     Days per week: Not on file     Minutes per session: Not on file     Stress: Not on file   Relationships     Social connections     Talks on phone: Not on file     Gets together: Not on file     Attends Church service: Not on file     Active member of club or organization: Not on file     Attends meetings of clubs or organizations: Not on file     Relationship status: Not on file     Intimate partner violence     Fear of current or ex partner: Not on file     Emotionally abused: Not on file      Physically abused: Not on file     Forced sexual activity: Not on file   Other Topics Concern     Parent/sibling w/ CABG, MI or angioplasty before 65F 55M? Not Asked   Social History Narrative     Not on file     Patient Active Problem List   Diagnosis     CHF (congestive heart failure) (H)     Acute systolic congestive heart failure (H)     Nonischemic cardiomyopathy (H)     Cardiac defibrillator, [employer], Single Chamber- NOT dependent     Nocturnal oxygen desaturation     Acute on chronic systolic CHF (congestive heart failure) (H)     Heart failure with acute decompensation, type unknown (H)     Acute on chronic systolic heart failure (H)     Heart failure (H)     LVAD (left ventricular assist device) present (H)     Systolic heart failure (H)     Long term (current) use of anticoagulants     Awaiting organ transplant     Chronic systolic congestive heart failure (H)     No Known Allergies    Outpatient Encounter Medications as of 7/16/2020   Medication Sig Dispense Refill     allopurinol (ZYLOPRIM) 100 MG tablet Take 2 tablets (200 mg) by mouth daily 180 tablet 1     amiodarone (PACERONE) 200 MG tablet Take 1 tablet (200 mg) by mouth daily 90 tablet 3     amoxicillin (AMOXIL) 500 MG capsule Take 4 capsules (2,000 mg) by mouth once as needed (Take 4 capsules (2000mg), one hour before any dental cleanings or procedures) 4 capsule 0     aspirin (ASA) 81 MG chewable tablet Take 1 tablet (81 mg) by mouth daily 120 tablet 0     Blood Glucose Monitoring Suppl (BLOOD GLUCOSE MONITOR SYSTEM) w/Device KIT three times a week       bumetanide (BUMEX) 1 MG tablet Take 2 tablets (2 mg) by mouth daily 360 tablet 3     colchicine (COLCRYS) 0.6 MG tablet Take 1 tablet (0.6 mg) by mouth daily 30 tablet 0     losartan (COZAAR) 25 MG tablet Take 1 tablet (25 mg) by mouth daily 90 tablet 3     metoprolol succinate ER (TOPROL XL) 25 MG 24 hr tablet Take 1.5 tablets (37.5 mg) by mouth daily 90 tablet 3     potassium chloride  ER (K-DUR/KLOR-CON M) 10 MEQ CR tablet Take 2 tablets (20 mEq) by mouth 2 times daily 120 tablet 11     spironolactone (ALDACTONE) 25 MG tablet TAKE 1.5 TABLETS (37.5 MG) BY MOUTH DAILY 135 tablet 3     warfarin ANTICOAGULANT (COUMADIN) 2 MG tablet TAKE 1 & 1/2 TABLET BY MOUTH EACH DAY AT 6PM 135 tablet 3     enoxaparin (LOVENOX) 100 MG/ML syringe Inject 100 mg (1 ml) every 12 hours as directed by the Anticoagulation Clinic. (Patient not taking: Reported on 7/16/2020) 10 Syringe 1     tacrolimus (PROTOPIC) 0.1 % external ointment Apply topically 2 times daily (Patient not taking: Reported on 7/16/2020) 30 g 3     traZODone (DESYREL) 100 MG tablet TAKE 1 TABLET BY MOUTH EVERY DAY AT BEDTIME AS NEEDED FOR SLEEP (Patient not taking: Reported on 7/16/2020) 30 tablet 1     vitamin B1 (THIAMINE) 100 MG tablet Take 1 tablet (100 mg) by mouth daily (Patient not taking: Reported on 7/16/2020) 30 tablet 0     No facility-administered encounter medications on file as of 7/16/2020.            ROS:  A comprehensive ROS was done, positives are per HPI.        His records were reviewed.    No results found for any visits on 07/16/20.  Component      Latest Ref Rng & Units 10/24/2018 11/13/2018 12/22/2018   Uric Acid      3.5 - 7.2 mg/dL 12.9 (H) 12.0 (H) 3.8     Exam: XR FOOT PORT BILATERAL 3 VW, 12/13/2018 3:51 PM     Indication: eval for evidence of gouty erosions     Comparison: None     Findings:   Right: No acute osseous abnormality. Moderate degenerative changes of  the first metatarsophalangeal joint with adjacent nonspecific  mineralization. Possible erosions seen on the lateral view of the  distal first metatarsal. Vascular calcification. Diffuse soft tissue  swelling/stranding.     Left: No acute osseous thoracic. Small erosion at the first  metatarsophalangeal joint. Mild scattered degenerative  change.  Nonspecific cortical irregularity at the base of the fifth metatarsal  may be from chronic trauma or less likely  erosion. Diffuse soft tissue  swelling/stranding.                                                                      Impression: Small erosion of the left first metatarsophalangeal joint  with possible erosion of the first metatarsophalangeal joint on the  right.     SHAY GONZALEZ MD    Component      Latest Ref Rng & Units 7/2/2020   Sodium      133 - 144 mmol/L 134   Potassium      3.4 - 5.3 mmol/L 4.0   Chloride      94 - 109 mmol/L 103   Carbon Dioxide      20 - 32 mmol/L 27   Anion Gap      3 - 14 mmol/L 4   Glucose      70 - 99 mg/dL 95   Urea Nitrogen      7 - 30 mg/dL 18   Creatinine      0.66 - 1.25 mg/dL 1.52 (H)   GFR Estimate      >60 mL/min/1.73:m2 48 (L)   GFR Estimate If Black      >60 mL/min/1.73:m2 55 (L)   Calcium      8.5 - 10.1 mg/dL 9.1   Bilirubin Total      0.2 - 1.3 mg/dL 0.8   Albumin      3.4 - 5.0 g/dL 4.0   Protein Total      6.8 - 8.8 g/dL 8.5   Alkaline Phosphatase      40 - 150 U/L 98   ALT      0 - 70 U/L 46   AST      0 - 45 U/L 34   WBC      4.0 - 11.0 10e9/L 10.3   RBC Count      4.4 - 5.9 10e12/L 5.09   Hemoglobin      13.3 - 17.7 g/dL 16.9   Hematocrit      40.0 - 53.0 % 49.7   MCV      78 - 100 fl 98   MCH      26.5 - 33.0 pg 33.2 (H)   MCHC      31.5 - 36.5 g/dL 34.0   RDW      10.0 - 15.0 % 13.6   Platelet Count      150 - 450 10e9/L 320       Ph.E:    Constitutional: WD/WN. Pleasant. In no acute distress.  Eyes: EOM intact, sclera anicteric, conj not injected  MS: No synovitis. No tophi.  Skin: No skin rash  Neuro: A&O x 3. Grossly non focal  Psych: NL affect and mood    Assessment/ plan:    Crystal proven erosive gout: 64 yo M with history of DM2, CHF s/p LVAD (12/5/18), crystal proven gout (based on L knee artherocentesis 12/13/2018) with evidence of mild erosions of 1st metatarsals on foot xray. He was initially seen for gout consult on 12/13/2018, L knee was injected with steroid and lidocaine and he received anakinra 100 mg every day x 5 days for gout mx. He was  started on allopurinol 200 mg qd and colcrys 0.6 mg qd. Tolertaes meds well. Had another ER visit on 12/21 when he received one dose of prednisone 40 mg every day for gout flare of R foot.    Today, is feeling well, no flare of gout since initial visit in 12/2018. I explained to him the  Need for follow ups and the fact that colcrys should be attempted to be tapered off given long term SE of myopathy. Since he lost follow up, remained on colcrys.     DIAGNOSIS:  1. Crystal proven gouty arthritis (affected joints include bilateral MTPs, right midfoot and ankle, left knee), gout is well controlled. SUA=4.5 at goal in 12/2018.    Plan:    For colcrys, taper to one tab 0.6 mg on Mon/Wed/Fri x 1 month and if no gout flare up, stop it    If any gout flare ups, resume taking one tab of colcrys a day and call clinic or after hours/weekend, call the  at  [phone] and ask to page the rheumatologist on call    Stay on allopurinol 200 mg a day    Allopurinol and colcrys monitoring labs q12mo, recent labs were reviewed.    Return in a year      11:10-11:26 am    Melody Nassar MD

## 2020-07-16 NOTE — PATIENT INSTRUCTIONS
For colcrys, taper to one tab 0.6 mg on Mon/Wed/Fri x 1 month and if no gout flare up, stop it    If any gout flare ups, resume taking one tab of colcrys a day and call clinic or after hours/weekend, call the  at  [phone] and ask to page the rheumatologist on call    Stay on allopurinol 200 mg a day    Return in a year

## 2020-07-16 NOTE — LETTER
"7/16/2020       RE: Danielito Chu  [address] [place] [address]     Dear Colleague,    Thank you for referring your patient, Danielito Chu, to the [facility] RHEUMATOLOGY at [facility]. Please see a copy of my visit note below.    Danielito Chu is a 65 year old male who is being evaluated via a billable video visit.      The patient has been notified of following:     \"This video visit will be conducted via a call between you and your physician/provider. We have found that certain health care needs can be provided without the need for an in-person physical exam.  This service lets us provide the care you need with a video conversation.  If a prescription is necessary we can send it directly to your pharmacy.  If lab work is needed we can place an order for that and you can then stop by our lab to have the test done at a later time.    Video visits are billed at different rates depending on your insurance coverage.  Please reach out to your insurance provider with any questions.    If during the course of the call the physician/provider feels a video visit is not appropriate, you will not be charged for this service.\"    Patient has given verbal consent for Video visit? Yes  How would you like to obtain your AVS? MyChart  If you are dropped from the video visit, the video invite should be resent to: Text to cell phone: [phone]  Will anyone else be joining your video visit? No        Video-Visit Details    Type of service:  Video Visit    Video Start Time: 11:10 am  Video End Time: 11:26 am    Originating Location (pt. Location): Home    Distant Location (provider location):  [facility] RHEUMATOLOGY     Platform used for Video Visit: Tracee Nassar MD              Rheumatology Follow up Virtual Visit Note    Reason for referral: Erosive crystal proven gout      Last seen: 12/26/2018    DOS: 7/16/2020    (this is a video visit due to " COVID-19 outbreak), patient agreed         HPI:    Danielito Chu is a 63 year old male. He is here for initial visit, f/u hospitalization and to establish care for his gout. He lives far away and has to coordinate his visits to the [facility].    He was initially seen for gout consult on 12/13/2018, L knee was injected with steroid and lidocaine and he received anakinra for gout mx. He was started on allopurinol and colcrys. Tolertaes meds well.Had another ER visit on 12/21 when he received one dose of prednisone 40 mg every day for gout flare of R foot.    Today, is feeling well, has mild residual tenderness of the R foot.    Today 7/16/2020: Lost follow up since initial visit in 12/2018 but has not had any gout flares since then. Reports  Being told to make this appointment to be able to get refills on meds.      Past Medical History:   Diagnosis Date     Acute systolic congestive heart failure (H) 4/5/2017     Diabetes (H)      HTN (hypertension)      Hyperlipidemia      Liver disease      LVAD (left ventricular assist device) present (H)     HM 3 12/18     Marijuana abuse      Mitral regurgitation      Nocturnal oxygen desaturation 3/29/2018     Nonischemic cardiomyopathy (H) 4/5/2017     SHIRLEY (obstructive sleep apnea)      Pacemaker 04/07/2017    ICD 4/7/17     Situational mixed anxiety and depressive disorder      Past Surgical History:   Procedure Laterality Date     CV RIGHT HEART CATH N/A 5/9/2019    Procedure: CV RIGHT HEART CATH;  Surgeon: Jules Allan MD;  Location:  HEART CARDIAC CATH LAB     ESOPHAGOSCOPY, GASTROSCOPY, DUODENOSCOPY (EGD), COMBINED N/A 11/21/2018    Procedure: COMBINED ESOPHAGOSCOPY, GASTROSCOPY, DUODENOSCOPY (EGD);  Surgeon: Candido Mendez MD;  Location:  GI     IMPLANT IMPLANTABLE CARDIOVERTER DEFIBRILLATOR       INSERT VENTRICULAR ASSIST DEVICE LEFT (HEARTMATE II/III) MINIMALLY INVASIVE N/A 12/5/2018    Procedure: Partial Median Sternotomy, Left Thoracotomy,  Minimally Invasive Left Ventricular Assist Device Placement (Heartmate III), On Cardiopulmonary Bypass;  Surgeon: Andrei Silva MD;  Location: [facility] OR     Family History   Problem Relation Age of Onset     Heart Failure Father          at age 86     Heart Failure Paternal Uncle          at 66      Myocardial Infarction Paternal Uncle          at age 62     Coronary Artery Disease Mother          during CABG at age 41     Social History     Socioeconomic History     Marital status: Single     Spouse name: Not on file     Number of children: Not on file     Years of education: Not on file     Highest education level: Not on file   Occupational History     Not on file   Social Needs     Financial resource strain: Not on file     Food insecurity     Worry: Not on file     Inability: Not on file     Transportation needs     Medical: Not on file     Non-medical: Not on file   Tobacco Use     Smoking status: Former Smoker     Packs/day: 0.30     Years: 20.00     Pack years: 6.00     Types: Cigarettes     Smokeless tobacco: Never Used     Tobacco comment: quit 3/2017   Substance and Sexual Activity     Alcohol use: No     Frequency: Never     Comment: rare     Drug use: No     Sexual activity: Not on file   Lifestyle     Physical activity     Days per week: Not on file     Minutes per session: Not on file     Stress: Not on file   Relationships     Social connections     Talks on phone: Not on file     Gets together: Not on file     Attends [employer] service: Not on file     Active member of club or organization: Not on file     Attends meetings of clubs or organizations: Not on file     Relationship status: Not on file     Intimate partner violence     Fear of current or ex partner: Not on file     Emotionally abused: Not on file     Physically abused: Not on file     Forced sexual activity: Not on file   Other Topics Concern     Parent/sibling w/ CABG, MI or angioplasty before 65F 55M? Not Asked    Social History Narrative     Not on file     Patient Active Problem List   Diagnosis     CHF (congestive heart failure) (H)     Acute systolic congestive heart failure (H)     Nonischemic cardiomyopathy (H)     Cardiac defibrillator, [employer], Single Chamber- NOT dependent     Nocturnal oxygen desaturation     Acute on chronic systolic CHF (congestive heart failure) (H)     Heart failure with acute decompensation, type unknown (H)     Acute on chronic systolic heart failure (H)     Heart failure (H)     LVAD (left ventricular assist device) present (H)     Systolic heart failure (H)     Long term (current) use of anticoagulants     Awaiting organ transplant     Chronic systolic congestive heart failure (H)     No Known Allergies    Outpatient Encounter Medications as of 7/16/2020   Medication Sig Dispense Refill     allopurinol (ZYLOPRIM) 100 MG tablet Take 2 tablets (200 mg) by mouth daily 180 tablet 1     amiodarone (PACERONE) 200 MG tablet Take 1 tablet (200 mg) by mouth daily 90 tablet 3     amoxicillin (AMOXIL) 500 MG capsule Take 4 capsules (2,000 mg) by mouth once as needed (Take 4 capsules (2000mg), one hour before any dental cleanings or procedures) 4 capsule 0     aspirin (ASA) 81 MG chewable tablet Take 1 tablet (81 mg) by mouth daily 120 tablet 0     Blood Glucose Monitoring Suppl (BLOOD GLUCOSE MONITOR SYSTEM) w/Device KIT three times a week       bumetanide (BUMEX) 1 MG tablet Take 2 tablets (2 mg) by mouth daily 360 tablet 3     colchicine (COLCRYS) 0.6 MG tablet Take 1 tablet (0.6 mg) by mouth daily 30 tablet 0     losartan (COZAAR) 25 MG tablet Take 1 tablet (25 mg) by mouth daily 90 tablet 3     metoprolol succinate ER (TOPROL XL) 25 MG 24 hr tablet Take 1.5 tablets (37.5 mg) by mouth daily 90 tablet 3     potassium chloride ER (K-DUR/KLOR-CON M) 10 MEQ CR tablet Take 2 tablets (20 mEq) by mouth 2 times daily 120 tablet 11     spironolactone (ALDACTONE) 25 MG tablet TAKE 1.5 TABLETS  (37.5 MG) BY MOUTH DAILY 135 tablet 3     warfarin ANTICOAGULANT (COUMADIN) 2 MG tablet TAKE 1 & 1/2 TABLET BY MOUTH EACH DAY AT 6PM 135 tablet 3     enoxaparin (LOVENOX) 100 MG/ML syringe Inject 100 mg (1 ml) every 12 hours as directed by the Anticoagulation Clinic. (Patient not taking: Reported on 7/16/2020) 10 Syringe 1     tacrolimus (PROTOPIC) 0.1 % external ointment Apply topically 2 times daily (Patient not taking: Reported on 7/16/2020) 30 g 3     traZODone (DESYREL) 100 MG tablet TAKE 1 TABLET BY MOUTH EVERY DAY AT BEDTIME AS NEEDED FOR SLEEP (Patient not taking: Reported on 7/16/2020) 30 tablet 1     vitamin B1 (THIAMINE) 100 MG tablet Take 1 tablet (100 mg) by mouth daily (Patient not taking: Reported on 7/16/2020) 30 tablet 0     No facility-administered encounter medications on file as of 7/16/2020.            ROS:  A comprehensive ROS was done, positives are per HPI.        His records were reviewed.    No results found for any visits on 07/16/20.  Component      Latest Ref Rng & Units 10/24/2018 11/13/2018 12/22/2018   Uric Acid      3.5 - 7.2 mg/dL 12.9 (H) 12.0 (H) 3.8     Exam: XR FOOT PORT BILATERAL 3 VW, 12/13/2018 3:51 PM     Indication: eval for evidence of gouty erosions     Comparison: None     Findings:   Right: No acute osseous abnormality. Moderate degenerative changes of  the first metatarsophalangeal joint with adjacent nonspecific  mineralization. Possible erosions seen on the lateral view of the  distal first metatarsal. Vascular calcification. Diffuse soft tissue  swelling/stranding.     Left: No acute osseous thoracic. Small erosion at the first  metatarsophalangeal joint. Mild scattered degenerative  change.  Nonspecific cortical irregularity at the base of the fifth metatarsal  may be from chronic trauma or less likely erosion. Diffuse soft tissue  swelling/stranding.                                                                      Impression: Small erosion of the left first  metatarsophalangeal joint  with possible erosion of the first metatarsophalangeal joint on the  right.     SHAY GONZALEZ MD    Component      Latest Ref Rng & Units 7/2/2020   Sodium      133 - 144 mmol/L 134   Potassium      3.4 - 5.3 mmol/L 4.0   Chloride      94 - 109 mmol/L 103   Carbon Dioxide      20 - 32 mmol/L 27   Anion Gap      3 - 14 mmol/L 4   Glucose      70 - 99 mg/dL 95   Urea Nitrogen      7 - 30 mg/dL 18   Creatinine      0.66 - 1.25 mg/dL 1.52 (H)   GFR Estimate      >60 mL/min/1.73:m2 48 (L)   GFR Estimate If Black      >60 mL/min/1.73:m2 55 (L)   Calcium      8.5 - 10.1 mg/dL 9.1   Bilirubin Total      0.2 - 1.3 mg/dL 0.8   Albumin      3.4 - 5.0 g/dL 4.0   Protein Total      6.8 - 8.8 g/dL 8.5   Alkaline Phosphatase      40 - 150 U/L 98   ALT      0 - 70 U/L 46   AST      0 - 45 U/L 34   WBC      4.0 - 11.0 10e9/L 10.3   RBC Count      4.4 - 5.9 10e12/L 5.09   Hemoglobin      13.3 - 17.7 g/dL 16.9   Hematocrit      40.0 - 53.0 % 49.7   MCV      78 - 100 fl 98   MCH      26.5 - 33.0 pg 33.2 (H)   MCHC      31.5 - 36.5 g/dL 34.0   RDW      10.0 - 15.0 % 13.6   Platelet Count      150 - 450 10e9/L 320       Ph.E:    Constitutional: WD/WN. Pleasant. In no acute distress.  Eyes: EOM intact, sclera anicteric, conj not injected  MS: No synovitis. No tophi.  Skin: No skin rash  Neuro: A&O x 3. Grossly non focal  Psych: NL affect and mood    Assessment/ plan:    Crystal proven erosive gout: 64 yo M with history of DM2, CHF s/p LVAD (12/5/18), crystal proven gout (based on L knee artherocentesis 12/13/2018) with evidence of mild erosions of 1st metatarsals on foot xray. He was initially seen for gout consult on 12/13/2018, L knee was injected with steroid and lidocaine and he received anakinra 100 mg every day x 5 days for gout mx. He was started on allopurinol 200 mg qd and colcrys 0.6 mg qd. Tolertaes meds well. Had another ER visit on 12/21 when he received one dose of prednisone 40 mg every day for  gout flare of R foot.    Today, is feeling well, no flare of gout since initial visit in 12/2018. I explained to him the  Need for follow ups and the fact that colcrys should be attempted to be tapered off given long term SE of myopathy. Since he lost follow up, remained on colcrys.     DIAGNOSIS:  1. Crystal proven gouty arthritis (affected joints include bilateral MTPs, right midfoot and ankle, left knee), gout is well controlled. SUA=4.5 at goal in 12/2018.    Plan:    For colcrys, taper to one tab 0.6 mg on Mon/Wed/Fri x 1 month and if no gout flare up, stop it    If any gout flare ups, resume taking one tab of colcrys a day and call clinic or after hours/weekend, call the  at  [phone] and ask to page the rheumatologist on call    Stay on allopurinol 200 mg a day    Allopurinol and colcrys monitoring labs q12mo, recent labs were reviewed.    Return in a year      11:10-11:26 am    Melody Nassar MD

## 2020-07-16 NOTE — PROGRESS NOTES
"Danielito Chu is a 65 year old male who is being evaluated via a billable video visit.      The patient has been notified of following:     \"This video visit will be conducted via a call between you and your physician/provider. We have found that certain health care needs can be provided without the need for an in-person physical exam.  This service lets us provide the care you need with a video conversation.  If a prescription is necessary we can send it directly to your pharmacy.  If lab work is needed we can place an order for that and you can then stop by our lab to have the test done at a later time.    Video visits are billed at different rates depending on your insurance coverage.  Please reach out to your insurance provider with any questions.    If during the course of the call the physician/provider feels a video visit is not appropriate, you will not be charged for this service.\"    Patient has given verbal consent for Video visit? Yes  How would you like to obtain your AVS? MyChart  If you are dropped from the video visit, the video invite should be resent to: Text to cell phone: [phone]  Will anyone else be joining your video visit? No        Video-Visit Details    Type of service:  Video Visit    Video Start Time: 11:10 am  Video End Time: 11:26 am    Originating Location (pt. Location): Home    Distant Location (provider location):  [facility] RHEUMATOLOGY     Platform used for Video Visit: Tracee Nassar MD            "

## 2020-07-16 NOTE — TELEPHONE ENCOUNTER
Pt reports he forgot to get an INR today, but will make sure to get one tomorrow. Updated the calendar.

## 2020-07-20 NOTE — PROGRESS NOTES
ANTICOAGULATION FOLLOW-UP CLINIC VISIT    Patient Name:  Danielito Chu  Date:  7/20/2020  Contact Type:  Telephone    SUBJECTIVE:  Patient Findings     Positives:   Extra doses    Comments:   Pt thinks that he may have doubled up a dose one day. Pt is scheduled to see the LVAD team this Friday and will get an INR rechecked         Clinical Outcomes     Comments:   Pt thinks that he may have doubled up a dose one day. Pt is scheduled to see the LVAD team this Friday and will get an INR rechecked            OBJECTIVE    Recent labs: (last 7 days)     07/20/20   INR 3.4*       ASSESSMENT / PLAN  INR assessment SUPRA    Recheck INR In: 4 DAYS    INR Location Outside lab      Anticoagulation Summary  As of 7/20/2020    INR goal:   2.0-3.0   TTR:   80.2 % (1 y)   INR used for dosing:   3.4! (7/20/2020)   Warfarin maintenance plan:   2 mg (2 mg x 1) every day   Full warfarin instructions:   7/20: 1 mg; Otherwise 2 mg every day   Weekly warfarin total:   14 mg   Plan last modified:   Bri Becerril RN (3/18/2020)   Next INR check:   7/24/2020   Priority:   Critical   Target end date:   Indefinite    Indications    LVAD (left ventricular assist device) present (H) [Z95.811]  Long term (current) use of anticoagulants [Z79.01]  Chronic systolic congestive heart failure (H) [I50.22]             Anticoagulation Episode Summary     INR check location:       Preferred lab:       Send INR reminders to:   [facility]    Comments:   [place] 4/27-5[facility]8 [facility] [phone]/Fax [phone]  LVAD plced 12/5/18  3 ASA 81mg Daily Jantoven 2mg tabs Lab [phone]/[phone]  Call pt at [phone] Emphasize next INR date with pt++Send email each time++  Pt is home .      Anticoagulation Care Providers     Provider Role Specialty Phone number    Lorena Watkins MD Referring Cardiology [phone]            See the Encounter Report to view Anticoagulation Flowsheet and Dosing Calendar (Go  to Encounters tab in chart review, and find the Anticoagulation Therapy Visit)    Spoke with patient. Gave them their lab results and new warfarin recommendation.  No changes in health, medication, or diet. No missed doses, no falls. No signs or symptoms of bleed or clotting.     Patient had LVAD placed on:   12/5/18  Type of LVAD: HM 3  Patient's current Aspirin dose: ASA 81mg Daily  LVAD Protocol followed: Yes   If Not Followed Explanation:  N/A    Kristie Fernandez RN

## 2020-07-20 NOTE — TELEPHONE ENCOUNTER
Patient Call: Voicemail  Date/Time: 7/20/20 1110am  Reason for call: Patient left a voicemail requesting a call back from Ashley. He will be traveling and would like to be inactive on the list.

## 2020-07-21 NOTE — TELEPHONE ENCOUNTER
Spoke with the patient and confirmed Heart Waitlist appointments on 7/24/20 and 8/5/20 .  Informed patient an itinerary can be accessed on [employer], and will be sent via email.

## 2020-07-23 NOTE — TELEPHONE ENCOUNTER
Patient Call: General    Reason for call: patient called would like to take a vacation and would like to be non active on the list. Patient would like to give coordinator his vacation dates    Call back needed? Yes    Return Call Needed  Same as documented in contacts section  When to return call?: Greater than one day: Route standard priority

## 2020-07-24 NOTE — TELEPHONE ENCOUNTER
Patient Call: Voicemail  Date/Time: 7/24/2020 at 11:51am  Reason for call: Patient called to let coordinator know that his vacation, which was supposed to start July 24th, is suspended and he won't leave until probably August 7th. Needs to discuss when he should be changed to inactive status. He can be reached at [phone].

## 2020-07-24 NOTE — TELEPHONE ENCOUNTER
Called patient back to clarify dates of vacation.  Discussed these dates a few days ago and asked patient to call coordinator back if anything had changed from the previous plan (leaving 7-25-20).

## 2020-07-24 NOTE — TELEPHONE ENCOUNTER
Patient Call  07/24/20    Called patient and left message to discuss plan of care for transplant listing, will not be traveling tomorrow.  Will remain active.  Asked patient to call transplant coordinator at [phone] (option 2) as soon as able.

## 2020-07-24 NOTE — PROGRESS NOTES
ANTICOAGULATION FOLLOW-UP CLINIC VISIT    Patient Name:  Danielito Chu  Date:  2020  Contact Type:  Telephone    SUBJECTIVE:         OBJECTIVE    Recent labs: (last 7 days)     20  1051   INR 2.62*       ASSESSMENT / PLAN  INR assessment THER    Recheck INR In: 4 WEEKS    INR Location Clinic      Anticoagulation Summary  As of 2020    INR goal:   2.0-3.0   TTR:   79.6 % (1 y)   INR used for dosin.62 (2020)   Warfarin maintenance plan:   2 mg (2 mg x 1) every day   Full warfarin instructions:   2 mg every day   Weekly warfarin total:   14 mg   No change documented:   Lorena Kamara RN   Plan last modified:   Bri Becerril RN (3/18/2020)   Next INR check:   2020   Priority:   Critical   Target end date:   Indefinite    Indications    LVAD (left ventricular assist device) present (H) [Z95.811]  Long term (current) use of anticoagulants [Z79.01]  Chronic systolic congestive heart failure (H) [I50.22]             Anticoagulation Episode Summary     INR check location:       Preferred lab:       Send INR reminders to:   [facility]    Comments:   [place] [facility] [phone]/Fax [phone]  LVAD plced 18 HM 3 ASA 81mg Daily Jantoven 2mg tabs Lab [phone]/[phone]  Call pt at [phone] Emphasize next INR date with pt++Send email each time++  Pt is home .      Anticoagulation Care Providers     Provider Role Specialty Phone number    Lorena Watkins MD Referring Cardiology [phone]            See the Encounter Report to view Anticoagulation Flowsheet and Dosing Calendar (Go to Encounters tab in chart review, and find the Anticoagulation Therapy Visit)  Spoke with patient.  Patient had LVAD placed on:   18  Type of LVAD: HM3  Patient's current Aspirin dose: 81mg  LVAD Protocol followed:  Yes  Lorena Kamara RN

## 2020-08-05 NOTE — LETTER
2020       RE: Danielito Chu  [address] [place] [address]     Dear Colleague,    Thank you for referring your patient, Danielito Chu, to the [facility] at [facility]. Please see a copy of my visit note below.    Danielito Chu is a 65 year old male who is being evaluated via a billable video visit.        Dion Erickson LPN        Palliative Care Outpatient Clinic Consultation Note    Patient:  Danielito Chu    Chief Complaint:   Danielito Chu 65 year old male who is presenting to the palliative medicine clinic today at the request of Dr Lorena Watkins for a palliative care consultation secondary to candidacy for heart transplant.   The patient's primary care provider is:  Sapphire Stewart.     History of Present Illness:  66 yo male with nonischemic cardiomyopathy, ICD (2017), LVAD (placed 2018) who remains active, living independently.    Patient's Disease Understanding: Aware of advanced nature of NICM; concerned that he will be too well to qualify for transplant and also concerned he may age out of candidacy for cardiac  transplant.       Social History  Living Situation: Lives alone, significant other Yanna lives out of state for work, sister Fani helps  Children: none  Actual/Potential Caregiver(s): Fani, Yanna, and other family members  Support System: same  Occupation: retired     Social History     Tobacco Use     Smoking status: Former Smoker     Packs/day: 0.30     Years: 20.00     Pack years: 6.00     Types: Cigarettes     Smokeless tobacco: Never Used     Tobacco comment: quit 3/2017   Substance Use Topics     Alcohol use: No     Frequency: Never     Comment: rare     Drug use: No       Family History  Family History   Problem Relation Age of Onset     Heart Failure Father          at age 86     Heart Failure Paternal Uncle          at 66      Myocardial Infarction Paternal  "Uncle          at age 62     Coronary Artery Disease Mother          during CABG at age 41     Patient's Involvement with Prior History of Serious Illness in Family: Cousin with serious stroke, father  after long illness    Advance Care Planning:  Advance Directive:    None (working on this currently)  Where is written copy located: n/a  Health Care Agent Contact Information: identifies Fani (sister) and Yanna (significant other) as preferred surrogates.  POLST: n/a    Patient s   designated health care agent: Sister, Fani and significant other Yanna.     Patient s description of how they make decisions (by themselves, in consultation with certain close loved ones, based on advice from medical professionals, etc):  \"I trust my team of doctors very much\"--would want guidance as far as probable outcome from medical team to inform decisionmaking.    Patient s personal goals/hopes for receiving the transplant: I want to do everything I can to prepare myself for transplant and I'm hopeful that it could work.  Willing    Patient s worries/concerns when considering receiving the transplant: worry about not being able to eventually get a transplant, aging out of being a transplant candidate.     How does the patient envision or anticipate his/her future with the transplant? Fabiano notes a hope that he would not have to deal with battery packs and other accoutrements of his LVAD, is hoping he could remain active, swim again and remain independent.    Patient s thoughts about what constitutes a  good  quality of life: I want to be free from the batteries, able to shower and keep clean, go in a boat and not be afraid of getting in the water.  Being close to/in the water is significant value for Fabiano.    Patient s thoughts about what health conditions would be unacceptable (situations the patient would want her/his doctors and loved ones to know that she/he would not want life prolonged)? Family knows; pt and family " have discussed.  Would not want     Heart transplantation carries a small risk of perioperative stroke/brain injury, which however can be devastating. After a stroke, what sort of functional outcomes would be acceptable vs unacceptable to the patient? If I can walk (even with a walker), feed myself, go to the bathroom on my own, speak to my loved ones, that would be acceptable.  If I can't walk, if I can't eat on my own or can't speak that would be unacceptable.          No Known Allergies  Current Outpatient Medications   Medication Sig Dispense Refill     allopurinol (ZYLOPRIM) 100 MG tablet Take 2 tablets (200 mg) by mouth daily 180 tablet 1     amiodarone (PACERONE) 200 MG tablet Take 1 tablet (200 mg) by mouth daily 90 tablet 3     amoxicillin (AMOXIL) 500 MG capsule Take 4 capsules (2,000 mg) by mouth once as needed (Take 4 capsules (2000mg), one hour before any dental cleanings or procedures) 4 capsule 0     aspirin (ASA) 81 MG chewable tablet Take 1 tablet (81 mg) by mouth daily 120 tablet 0     Blood Glucose Monitoring Suppl (BLOOD GLUCOSE MONITOR SYSTEM) w/Device KIT three times a week       bumetanide (BUMEX) 1 MG tablet Take 2 tablets (2 mg) by mouth daily 360 tablet 3     colchicine (COLCRYS) 0.6 MG tablet One tab 0.6 mg on Mon/Wed/Fri x 1 month and if no gout flare up, stop it 15 tablet 1     losartan (COZAAR) 25 MG tablet Take 1 tablet (25 mg) by mouth daily 90 tablet 3     metoprolol succinate ER (TOPROL XL) 25 MG 24 hr tablet Take 1.5 tablets (37.5 mg) by mouth daily 90 tablet 3     potassium chloride ER (K-DUR/KLOR-CON M) 10 MEQ CR tablet Take 2 tablets (20 mEq) by mouth 2 times daily 120 tablet 11     spironolactone (ALDACTONE) 25 MG tablet TAKE 1.5 TABLETS (37.5 MG) BY MOUTH DAILY 135 tablet 3     warfarin ANTICOAGULANT (COUMADIN) 2 MG tablet TAKE 1 & 1/2 TABLET BY MOUTH EACH DAY AT 6PM 135 tablet 3     enoxaparin (LOVENOX) 100 MG/ML syringe Inject 100 mg (1 ml) every 12 hours as directed by the  Anticoagulation Clinic. (Patient not taking: Reported on 7/16/2020) 10 Syringe 1     tacrolimus (PROTOPIC) 0.1 % external ointment Apply topically 2 times daily (Patient not taking: Reported on 7/16/2020) 30 g 3     traZODone (DESYREL) 100 MG tablet TAKE 1 TABLET BY MOUTH EVERY DAY AT BEDTIME AS NEEDED FOR SLEEP (Patient not taking: Reported on 7/16/2020) 30 tablet 1     vitamin B1 (THIAMINE) 100 MG tablet Take 1 tablet (100 mg) by mouth daily (Patient not taking: Reported on 7/16/2020) 30 tablet 0     Past Medical History:   Diagnosis Date     Acute systolic congestive heart failure (H) 4/5/2017     Diabetes (H)      HTN (hypertension)      Hyperlipidemia      Liver disease      LVAD (left ventricular assist device) present (H)      3 12/18     Marijuana abuse      Mitral regurgitation      Nocturnal oxygen desaturation 3/29/2018     Nonischemic cardiomyopathy (H) 4/5/2017     SHIRLEY (obstructive sleep apnea)      Pacemaker 04/07/2017    ICD 4/7/17     Situational mixed anxiety and depressive disorder      Past Surgical History:   Procedure Laterality Date     CV RIGHT HEART CATH N/A 5/9/2019    Procedure: CV RIGHT HEART CATH;  Surgeon: Jules Allan MD;  Location:  HEART CARDIAC CATH LAB     CV RIGHT HEART CATH N/A 7/2/2020    Procedure: CV RIGHT HEART CATH;  Surgeon: Jeremy Mckeon MD;  Location:  HEART CARDIAC CATH LAB     ESOPHAGOSCOPY, GASTROSCOPY, DUODENOSCOPY (EGD), COMBINED N/A 11/21/2018    Procedure: COMBINED ESOPHAGOSCOPY, GASTROSCOPY, DUODENOSCOPY (EGD);  Surgeon: Candido Mendez MD;  Location:  GI     IMPLANT IMPLANTABLE CARDIOVERTER DEFIBRILLATOR       INSERT VENTRICULAR ASSIST DEVICE LEFT (HEARTMATE II/III) MINIMALLY INVASIVE N/A 12/5/2018    Procedure: Partial Median Sternotomy, Left Thoracotomy, Minimally Invasive Left Ventricular Assist Device Placement (Heartmate III), On Cardiopulmonary Bypass;  Surgeon: Andrei Silva MD;  Location:  OR          Exam:    Alert male, NAD.  Speaks in full sentences and engages easily.   Affect full.  Breathing nonlabored.      Data Reviewed:  LABS:   Creatinine 1.45, labs from  reviewed.  IMAGING:                      Impression:  1.  No acute intracranial pathology.   2.  Mild periventricular hypodensities, nonspecific which may  represent chronic small vessel ischemic disease.     I have personally reviewed the examination and initial interpretation  and I agree with the findings.          Recommendations & Counselin yo male with NICM, LVAD, disease-specific preparedness planning as described above.    Video-Visit Details    Type of service:  Video Visit    Video Start Time: 1:18pm  Video End Time: 1:50    Originating Location (pt. Location): Home    Distant Location (provider location):  [facility]     Platform used for Video Visit: Tracee Stark MD  Palliative Medicine Fellow      Attending Note:  Patient seen and evaluated with Dr Stark and I agree with/confirm their findings/recs in this note. I participated in the entire video visit. His sister was with him; he has pretty clear ideas about what would be 'too much' and his sister seems to have a good sense of that too. No f/u otherwise needed at the moment. Over 15 min of today's visit discussing ACP and HCD.      Thank you for involving us in the patient's care.   Aleksander Kendrick MD / Palliative Medicine / [place] [phone] - texts, without PHI, preferred / My clinic is in the [facility]: [phone] (scheduling); [phone] (triage). [facility] Inpatient Team Consult Pager [phone] (answered 8am-430pm M-F) - prefer text pages via [employer] / Palliative After-Hours Answering Service [phone].             Again, thank you for allowing me to participate in the care of your patient.      Sincerely,    Elizabeth Stark MD

## 2020-08-05 NOTE — PROGRESS NOTES
"Danielito Chu is a 65 year old male who is being evaluated via a billable video visit.      The patient has been notified of following:     \"This video visit will be conducted via a call between you and your physician/provider. We have found that certain health care needs can be provided without the need for an in-person physical exam.  This service lets us provide the care you need with a video conversation.  If a prescription is necessary we can send it directly to your pharmacy.  If lab work is needed we can place an order for that and you can then stop by our lab to have the test done at a later time.    Video visits are billed at different rates depending on your insurance coverage.  Please reach out to your insurance provider with any questions.    If during the course of the call the physician/provider feels a video visit is not appropriate, you will not be charged for this service.\"    Patient has given verbal consent for Video visit? Yes    How would you like to obtain your AVS? MyChart     If you are dropped from the video visit, the video invite should be resent to: Send to e-mail at:   [email or web address]    Will anyone else be joining your video visit? No        Dion Erickson LPN        Palliative Care Outpatient Clinic Consultation Note    Patient:  Danielito Chu    Chief Complaint:   Danielito Chu 65 year old male who is presenting to the palliative medicine clinic today at the request of Dr Lorena Watkins for a palliative care consultation secondary to candidacy for heart transplant.   The patient's primary care provider is:  Sapphire Stewart.     History of Present Illness:  64 yo male with nonischemic cardiomyopathy, ICD (2017), LVAD (placed December 2018) who remains active, living independently.    Patient's Disease Understanding: Aware of advanced nature of NICM; concerned that he will be too well to qualify for transplant and also concerned he may age out of candidacy " "for cardiac  transplant.       Social History  Living Situation: Lives alone, significant other Yanna lives out of state for work, sister Fani helps  Children: none  Actual/Potential Caregiver(s): Fani, Yanna, and other family members  Support System: same  Occupation: retired     Social History     Tobacco Use     Smoking status: Former Smoker     Packs/day: 0.30     Years: 20.00     Pack years: 6.00     Types: Cigarettes     Smokeless tobacco: Never Used     Tobacco comment: quit 3/2017   Substance Use Topics     Alcohol use: No     Frequency: Never     Comment: rare     Drug use: No       Family History  Family History   Problem Relation Age of Onset     Heart Failure Father          at age 86     Heart Failure Paternal Uncle          at 66      Myocardial Infarction Paternal Uncle          at age 62     Coronary Artery Disease Mother          during CABG at age 41     Patient's Involvement with Prior History of Serious Illness in Family: Cousin with serious stroke, father  after long illness    Advance Care Planning:  Advance Directive:    None (working on this currently)  Where is written copy located: n/a  Health Care Agent Contact Information: identifies Fani (sister) and Yanna (significant other) as preferred surrogates.  POLST: n/a    Patient s   designated health care agent: Sister, Fani and significant other Yanna.     Patient s description of how they make decisions (by themselves, in consultation with certain close loved ones, based on advice from medical professionals, etc):  \"I trust my team of doctors very much\"--would want guidance as far as probable outcome from medical team to inform decisionmaking.    Patient s personal goals/hopes for receiving the transplant: I want to do everything I can to prepare myself for transplant and I'm hopeful that it could work.  Willing    Patient s worries/concerns when considering receiving the transplant: worry about not being able to eventually " get a transplant, aging out of being a transplant candidate.     How does the patient envision or anticipate his/her future with the transplant? Fabiano notes a hope that he would not have to deal with battery packs and other accoutrements of his LVAD, is hoping he could remain active, swim again and remain independent.    Patient s thoughts about what constitutes a  good  quality of life: I want to be free from the batteries, able to shower and keep clean, go in a boat and not be afraid of getting in the water.  Being close to/in the water is significant value for Fabiano.    Patient s thoughts about what health conditions would be unacceptable (situations the patient would want her/his doctors and loved ones to know that she/he would not want life prolonged)? Family knows; pt and family have discussed.  Would not want     Heart transplantation carries a small risk of perioperative stroke/brain injury, which however can be devastating. After a stroke, what sort of functional outcomes would be acceptable vs unacceptable to the patient? If I can walk (even with a walker), feed myself, go to the bathroom on my own, speak to my loved ones, that would be acceptable.  If I can't walk, if I can't eat on my own or can't speak that would be unacceptable.          No Known Allergies  Current Outpatient Medications   Medication Sig Dispense Refill     allopurinol (ZYLOPRIM) 100 MG tablet Take 2 tablets (200 mg) by mouth daily 180 tablet 1     amiodarone (PACERONE) 200 MG tablet Take 1 tablet (200 mg) by mouth daily 90 tablet 3     amoxicillin (AMOXIL) 500 MG capsule Take 4 capsules (2,000 mg) by mouth once as needed (Take 4 capsules (2000mg), one hour before any dental cleanings or procedures) 4 capsule 0     aspirin (ASA) 81 MG chewable tablet Take 1 tablet (81 mg) by mouth daily 120 tablet 0     Blood Glucose Monitoring Suppl (BLOOD GLUCOSE MONITOR SYSTEM) w/Device KIT three times a week       bumetanide (BUMEX) 1 MG tablet Take  2 tablets (2 mg) by mouth daily 360 tablet 3     colchicine (COLCRYS) 0.6 MG tablet One tab 0.6 mg on Mon/Wed/Fri x 1 month and if no gout flare up, stop it 15 tablet 1     losartan (COZAAR) 25 MG tablet Take 1 tablet (25 mg) by mouth daily 90 tablet 3     metoprolol succinate ER (TOPROL XL) 25 MG 24 hr tablet Take 1.5 tablets (37.5 mg) by mouth daily 90 tablet 3     potassium chloride ER (K-DUR/KLOR-CON M) 10 MEQ CR tablet Take 2 tablets (20 mEq) by mouth 2 times daily 120 tablet 11     spironolactone (ALDACTONE) 25 MG tablet TAKE 1.5 TABLETS (37.5 MG) BY MOUTH DAILY 135 tablet 3     warfarin ANTICOAGULANT (COUMADIN) 2 MG tablet TAKE 1 & 1/2 TABLET BY MOUTH EACH DAY AT 6PM 135 tablet 3     enoxaparin (LOVENOX) 100 MG/ML syringe Inject 100 mg (1 ml) every 12 hours as directed by the Anticoagulation Clinic. (Patient not taking: Reported on 7/16/2020) 10 Syringe 1     tacrolimus (PROTOPIC) 0.1 % external ointment Apply topically 2 times daily (Patient not taking: Reported on 7/16/2020) 30 g 3     traZODone (DESYREL) 100 MG tablet TAKE 1 TABLET BY MOUTH EVERY DAY AT BEDTIME AS NEEDED FOR SLEEP (Patient not taking: Reported on 7/16/2020) 30 tablet 1     vitamin B1 (THIAMINE) 100 MG tablet Take 1 tablet (100 mg) by mouth daily (Patient not taking: Reported on 7/16/2020) 30 tablet 0     Past Medical History:   Diagnosis Date     Acute systolic congestive heart failure (H) 4/5/2017     Diabetes (H)      HTN (hypertension)      Hyperlipidemia      Liver disease      LVAD (left ventricular assist device) present (H)     HM 3 12/18     Marijuana abuse      Mitral regurgitation      Nocturnal oxygen desaturation 3/29/2018     Nonischemic cardiomyopathy (H) 4/5/2017     SHIRLEY (obstructive sleep apnea)      Pacemaker 04/07/2017    ICD 4/7/17     Situational mixed anxiety and depressive disorder      Past Surgical History:   Procedure Laterality Date     CV RIGHT HEART CATH N/A 5/9/2019    Procedure: CV RIGHT HEART CATH;  Surgeon:  Jules Allan MD;  Location:  HEART CARDIAC CATH LAB     CV RIGHT HEART CATH N/A 2020    Procedure: CV RIGHT HEART CATH;  Surgeon: Jeremy Mckeon MD;  Location:  HEART CARDIAC CATH LAB     ESOPHAGOSCOPY, GASTROSCOPY, DUODENOSCOPY (EGD), COMBINED N/A 2018    Procedure: COMBINED ESOPHAGOSCOPY, GASTROSCOPY, DUODENOSCOPY (EGD);  Surgeon: Candido Mendez MD;  Location:  GI     IMPLANT IMPLANTABLE CARDIOVERTER DEFIBRILLATOR       INSERT VENTRICULAR ASSIST DEVICE LEFT (HEARTMATE II/III) MINIMALLY INVASIVE N/A 2018    Procedure: Partial Median Sternotomy, Left Thoracotomy, Minimally Invasive Left Ventricular Assist Device Placement (Heartmate III), On Cardiopulmonary Bypass;  Surgeon: Andrei Silva MD;  Location:  OR          Exam:   Alert male, NAD.  Speaks in full sentences and engages easily.   Affect full.  Breathing nonlabored.      Data Reviewed:  LABS:   Creatinine 1.45, labs from  reviewed.  IMAGING:                      Impression:  1.  No acute intracranial pathology.   2.  Mild periventricular hypodensities, nonspecific which may  represent chronic small vessel ischemic disease.     I have personally reviewed the examination and initial interpretation  and I agree with the findings.          Recommendations & Counselin yo male with NICM, LVAD, disease-specific preparedness planning as described above.    Video-Visit Details    Type of service:  Video Visit    Video Start Time: 1:18pm  Video End Time: 1:50    Originating Location (pt. Location): Home    Distant Location (provider location):  [facility]     Platform used for Video Visit: Tracee Stark MD  Palliative Medicine Fellow      Attending Note:  Patient seen and evaluated with Dr Stark and I agree with/confirm their findings/recs in this note. I participated in the entire video visit. His sister was with him; he has pretty clear ideas about what would be  'too much' and his sister seems to have a good sense of that too. No f/u otherwise needed at the moment. Over 15 min of today's visit discussing ACP and HCD.      Thank you for involving us in the patient's care.   Aleksander Kendrick MD / Palliative Medicine / Mobile [phone] - texts, without PHI, preferred / [place] clinic is in the [facility]: [phone] (scheduling); [phone] (triage). [facility] Inpatient Team Consult Pager [phone] (answered 8am-430pm M-F) - prefer text pages via [employer] / Palliative After-Hours Answering Service [phone].

## 2020-08-06 NOTE — LETTER
August 7, 2020    Danielito Chu  [address] [place] [address]      Dear Mr. Chu,    This letter is sent to notify you that your listing status was changed from Status 4 to Status 7 on the heart transplant waitlist at the [facility].  Your status was changed due to candidate choice (travel).    During this waiting period, we may still request periodic laboratory tests with your primary physician.  It will be your responsibility to remind your physician to forward your results to the Transplant Office.    We still need to be kept informed of any changes such as:    o changes in your insurance coverage  o change in your phone number, address or emergency contact  o significant changes in your health  o significant infections (such as pneumonia or abscesses)  o blood transfusions  o any condition which requires hospitalization or surgery    Sincerely,        Heart Transplant Program    Enclosures: UNOS Letter                            The Organ Procurement and Transplantation Network  Toll-free patient services line:     Your resource for organ transplant information    If you have a question regarding your own medical care, you always should call your transplant hospital first. However, for general organ transplant-related information, you can call the Organ Procurement and Transplantation Network (OPTN) toll-free patient services line at [phone]- [phone]. Anyone, including potential transplant candidates, candidates, recipients, family members, friends, living donors, and donor family members, can call this number to:          Talk about organ donation, living donation, the transplant process, the donation process, and transplant policies.    Get a free patient information kit with helpful booklets, waiting list and transplant information, and a list of all transplant hospitals.    Ask questions about the OPTN website (https://optn.transplant.hrsa.gov/), the United Network  for Organ Sharing s (UNOS) website (https://unos.org/), or the UNOS website for living donors and transplant recipients. (https://www.transplantliving.org/).    Learn how the OPTN can help you.    Talk about any concerns that you may have with a transplant hospital.    The [facility] transplant system, the OPTN, is managed under federal contract by the [employer] Network for Organ Sharing (UNOS), which is a non-profit charitable organization. The OPTN helps create and define organ sharing policies that make the best use of donated organs. This process continuously evaluating new advances and discoveries so policies can be adapted to best serve patients waiting for transplants. To do so, the OPTN works closely with transplant professionals, transplant patients, transplant candidates, donor families, living donors, and the public. All transplant programs and organ procurement organizations throughout the country are OPTN members and are obligated to follow the policies the OPTN creates for allocating organs.    The OPTN also is responsible for:      Providing educational material for patients, the public, and professionals.    Raising awareness of the need for donated organs and tissue.    Coordinating organ procurement, matching, and placement.    Collecting information about every organ transplant and donation that occurs in the [place].    Remember, you should contact your transplant hospital directly if you have questions or concerns about your own medical care including medical records, work-up progress, and test results.    We are not your transplant hospital, and our staff will not be able to answer questions about your case, so please keep your transplant hospital s phone number handy.    However, while you research your transplant needs and learn as much as you can about transplantation and donation, we welcome your call to our toll-free patient services line at [phone].          Updated 4/1/2019

## 2020-08-06 NOTE — TELEPHONE ENCOUNTER
Patient Call: Voicemail  Date/Time: 8/6/20 / 4:55 pm  Reason for call: Ashley left a message for patient and he wanted to let Ashley know that he is traveling now 8/4-9/3. You can call him if you want to.

## 2020-08-07 NOTE — TELEPHONE ENCOUNTER
Status Change-[facility] 08/07/20    Changed patient's listing status in [facility] from status 4 to status 7 for the following reasons:  Patient choice (travel until 9-3-20).     Patient aware of change and letter sent per regulatory guidelines, primary cardiology team aware.

## 2020-08-11 NOTE — TELEPHONE ENCOUNTER
bumetanide (BUMEX) 1 MG tablet      Last Written Prescription Date:  Historical  Medication is historical  Not discussed in last visit plan.    potassium chloride ER (K-DUR/KLOR-CON M) 10 MEQ CR tablet      Last Written Prescription Date:  9/10/2019  Last Fill Quantity: 120,   # refills: 11  Failed medication protocol: abnormal labs  - Creatinine  - Sodium.  Creatinine   Date Value Ref Range Status   07/24/2020 1.45 (H) 0.66 - 1.25 mg/dL Final     Sodium   Date Value Ref Range Status   07/24/2020 131 (L) 133 - 144 mmol/L Final       Last Office Visit : 7/2/2020  Future Office visit:  9/8/2020

## 2020-08-21 NOTE — PROGRESS NOTES
9/1/20 ADDENDUM  Did not send reminder letter.  Per note from LVAD coordinator yesterday, patient will be getting labs by the end of the week.  Updated calendar.  Sent [facility] pool message to call [facility] on Friday.  ARAVIND Barnard        Patient reports that he is currently in [place].  Patient is worried about testing down there because of Covid. Fabiano will call us next week with his plan.

## 2020-08-31 NOTE — LETTER
Patient Name: Danielito Chu   : 1955   Diagnosis/ICD-10: Heart Failure, unspecified I50.9; LVAD Z95.811   Requesting Physician: Dr. Lorena Watkins   Date of Request: 20   Date to Draw      **Please fax results to Edgardo Elias RN VAD Coord at [phone] and to the [facility] Coumadin clinic [phone].                Call Asim [phone] with Questions      ORDER CODE TESTS TAMEKA.VOL.    CBASIC Na, K, Cl, CO2, Crea, BUN, Glu, Ca GG 0.5-1    BHEPAT Alb, AlkP, ALT, AST, BBil, TP GG 0.5-1    BLIP Chol, Trig, HDL, LDL GG 1-2   X CCOMP Na, K, Cl, CO2, Crea, BUN, Glu, Ca, Alb, AlkP, ALT, AST, Bbil, TP,  GG 0.6-1   X HGP CBC & Platelet P 0.3-1    HGDP CBC, Differential & Platelet P 0.3-1    PLT Platelet  P 0.3-1   X INR INR B 2.7    PTT PTT B 2.7   X LD Lactate Dehydrogenase GG 0.3-1    PHGB Plasma Hemoglobin GG 2-3    Pre-Albumin Pre-Albumin RG 1.0   X D-dimer D dimer Quantitative             Signed,      Lorena Watkins MD  Heart Failure, Mechanical Circulatory Support and Transplant Cardiology   of Medicine,  Division of Cardiology, [facility]

## 2020-08-31 NOTE — PROGRESS NOTES
Lab request sent to [facility] on [facility] in [place]. Fabiano will get the labs drawn this week in advance of his 9/8 virtual appointment with Ben.    Fabiano reports feeling well. He said he would send an email to Ashley the transplant coordinator to update her on when he was returning to [place].

## 2020-08-31 NOTE — LETTER
Patient Name: Danielito Chu   : 1955   Diagnosis/ICD-10: Heart Failure, unspecified I50.9; LVAD Z95.811   Requesting Physician: Dr. Lorena Watkins   Date of Request: 20   Date to Draw 2020     **Please fax results to Edgardo Elias RN VAD Coord at [phone].                Call [phone] with Questions      ORDER CODE TESTS TAMEKA.VOL.    CBASIC Na, K, Cl, CO2, Crea, BUN, Glu, Ca GG 0.5-1    BHEPAT Alb, AlkP, ALT, AST, BBil, TP GG 0.5-1    BLIP Chol, Trig, HDL, LDL GG 1-2   X CCOMP Na, K, Cl, CO2, Crea, BUN, Glu, Ca, Alb, AlkP, ALT, AST, Bbil, TP,  GG 0.6-1   X HGP CBC & Platelet P 0.3-1    HGDP CBC, Differential & Platelet P 0.3-1    PLT Platelet  P 0.3-1   X INR INR B 2.7    PTT PTT B 2.7   X LD Lactate Dehydrogenase GG 0.3-1    PHGB Plasma Hemoglobin GG 2-3    Pre-Albumin Pre-Albumin RG 1.0                  Signed,      Lorena Watkins MD  Heart Failure, Mechanical Circulatory Support and Transplant Cardiology   of Medicine,  Division of Cardiology, [facility]

## 2020-09-04 NOTE — PROGRESS NOTES
ANTICOAGULATION FOLLOW-UP CLINIC VISIT    Patient Name:  Danielito Chu  Date:  2020  Contact Type:  Telephone    SUBJECTIVE:         OBJECTIVE    Recent labs: (last 7 days)     20   INR 1.9*       ASSESSMENT / PLAN  INR assessment SUB    Recheck INR In: 5 DAYS    INR Location Outside lab      Anticoagulation Summary  As of 2020    INR goal:   2.0-3.0   TTR:   77.9 % (1 y)   INR used for dosin.9! (9/3/2020)   Warfarin maintenance plan:   2 mg (2 mg x 1) every day   Full warfarin instructions:   : 3 mg; Otherwise 2 mg every day   Weekly warfarin total:   14 mg   Plan last modified:   Bri Becerril RN (3/18/2020)   Next INR check:   2020   Priority:   Critical   Target end date:   Indefinite    Indications    LVAD (left ventricular assist device) present (H) [Z95.811]  Long term (current) use of anticoagulants [Z79.01]  Chronic systolic congestive heart failure (H) [I50.22]             Anticoagulation Episode Summary     INR check location:       Preferred lab:       Send INR reminders to:   [facility]    Comments:   [place] [facility] [place] [facility] [phone]/Fax [phone]  LVAD plced 18 HM 3 ASA 81mg Daily Jantoven 2mg tabs Lab [phone]/[phone]  Call pt at [phone] Emphasize next INR date with pt++Send email each time++  Pt is home .      Anticoagulation Care Providers     Provider Role Specialty Phone number    Lorena Watkins MD Referring Cardiology [phone]            See the Encounter Report to view Anticoagulation Flowsheet and Dosing Calendar (Go to Encounters tab in chart review, and find the Anticoagulation Therapy Visit)  Left message for patient with results and dosing recommendations. Asked patient to call back to report any missed doses, falls, signs and symptoms of bleeding or clotting, any changes in health, medication, or diet. Asked patient to call back with any questions or concerns. Sent e-mail too.  Patient  had LVAD placed on:   12/5/18  Type of LVAD: HM3  Patient's current Aspirin dose: 81mg  LVAD Protocol followed: next INR 9/8, or when pt can do it.   If Not Followed Explanation:  Labor day and covid-19      Lorena Kamara RN

## 2020-09-08 NOTE — PROGRESS NOTES
"I called Fabiano in [place] to talk about his lab results. The HGB, while still within defined limits, is over 1.5 points lower but these labs were from an outside lab. Fabiano confirmed that he is not bleeding anywhere. He said he feels a little \"huffy and puffing\" once in a while but he is doing a lot of physical work on his rental house in the [facility]. He said his weight might be up about 3 lbs. He said his is in the habit of taking an extra bumex occasionally when his weight goes up.    PLAN: Virtual visit with Shania the PA tomorrow.  "

## 2020-09-08 NOTE — PROGRESS NOTES
Edgardo Elias, RN  [facility]               Fabiano is starting Doxycycline for driveline site drainage. 100mg BID for two weeks. Thanks.     Asim               Addendum 9/8/20    Spoke with Fabiano he get an INR sometime this week . He will call us when he does so we can look for the results.  Fabiano said his INR was low last time because he had missed a dose.    Chandra Ordonez [facility]

## 2020-09-09 NOTE — PATIENT INSTRUCTIONS
Medicine changes  - No medicine changes today    Instructions  - We will work on figuring out how to get a swab on your driveline  - Send your blood pressure and HR to the LVAD coordinators when you are able.  - Okay to start cardiac rehab when you get back    Follow-up  - 3 months with cardiologist (cardiac rehab), in person

## 2020-09-09 NOTE — PROGRESS NOTES
Fabiano said the drainage from his DLES is about the same as he has always had: small amount, clearish yellow.     He said he would be willing to take doxycycline for two weeks and switch to the daily dsng as we recommend when there is more than usual drainage. Once the drainage stops he can switch back to the weekly dsng.    I told him that if he develops a fever, reddnes, pain at the driveline site, or swelling, or increased drainage, he should call us back immediately and we will have him go to the [facility] center in [place].    I told him how to send a photo on mychart and I warned him about the sun exposure. I told the coumadin clinic that Fabiano  is starting doxycycline.

## 2020-09-09 NOTE — PROGRESS NOTES
"Danielito Chu is a 65 year old male who is being evaluated via a billable telephone visit.      The patient has been notified of following:     \"This telephone visit will be conducted via a call between you and your physician/provider. We have found that certain health care needs can be provided without the need for a physical exam.  This service lets us provide the care you need with a short phone conversation.  If a prescription is necessary we can send it directly to your pharmacy.  If lab work is needed we can place an order for that and you can then stop by our lab to have the test done at a later time.    Telephone visits are billed at different rates depending on your insurance coverage. During this emergency period, for some insurers they may be billed the same as an in-person visit.  Please reach out to your insurance provider with any questions.    If during the course of the call the physician/provider feels a telephone visit is not appropriate, you will not be charged for this service.\"    Patient has given verbal consent for Telephone visit?  Yes    What phone number would you like to be contacted at? [phone]    How would you like to obtain your AVS? MyChart                "

## 2020-09-09 NOTE — PROGRESS NOTES
"Telephone visit  Start time 12:29 PM  End time 12:55 PM    HPI:   Mr. Danielito Chu is a 63 yr old male with a history of NICM (LVEF at dong of 15%) s/p ICD (4/2017),  who underwent HM3 LVAD implant on 12/5/18 as DT (some history of mariajuana smoking, liver disease).       This visit  He has been feeling okay since his last visit. He thinks he maybe has a \"couple of pounds\" of fluid. He thinks his abdomen is bloated. No LE edema. No SOB at rest. With a lot of shopping especially in big stores like [employer] he gets tired, but he can do what he needs to get done. Carrying groceries into the house up the stairs can make him BAJWA. No orthopnea. No PND. No lightheadedness or dizziness except with very quick position changes.  Still having some palpitations, but much improved since starting the amiodarone.    In June he was having more dizziness, has been much improved since speed increase. No further episodes.    Weights have been overall stable. 239 in March. 241 lbs today. No BP cuff at home.     No blood in the urine or blood in the stool. No prolonged nosebleeds.    He had some drainage start on the driveline a few weeks ago. Clear to maybe light yellow. It is a little milky. No redness or pain.     No headaches. No stroke symptoms.    He is currently down at his house in [place].    His pump parameters have been stable. Speed is 5500. Flow is 4.8. PI 3.2. Power 4.7.  No audible alarms.    Cardiac Medications  ASA 81 mg daily  Coumadin  Amiodarone 200 mg daily  Bumex 2 mg BID He takes a one mg once or twice a week if he feels like he has extra fluid. Usually it is in the setting of a missed dose the day prior.  KCl 20/10  Losartan 25 mg daily  Metoprolol succinate 37.5 mg daily  Spironolactone 37.5 mg daily    PAST MEDICAL HISTORY:  Past Medical History:   Diagnosis Date     Acute systolic congestive heart failure (H) 4/5/2017     Diabetes (H)      HTN (hypertension)      Hyperlipidemia      Liver disease      LVAD " (left ventricular assist device) present (H)     HM 3      Marijuana abuse      Mitral regurgitation      Nocturnal oxygen desaturation 3/29/2018     Nonischemic cardiomyopathy (H) 2017     SHIRLEY (obstructive sleep apnea)      Pacemaker 2017    ICD 17     Situational mixed anxiety and depressive disorder        FAMILY HISTORY:  Family History   Problem Relation Age of Onset     Heart Failure Father          at age 86     Heart Failure Paternal Uncle          at 66      Myocardial Infarction Paternal Uncle          at age 62     Coronary Artery Disease Mother          during CABG at age 41       SOCIAL HISTORY:  Social History     Socioeconomic History     Marital status: Single     Spouse name: Not on file     Number of children: Not on file     Years of education: Not on file     Highest education level: Not on file   Occupational History     Not on file   Social Needs     Financial resource strain: Not on file     Food insecurity:     Worry: Not on file     Inability: Not on file     Transportation needs:     Medical: Not on file     Non-medical: Not on file   Tobacco Use     Smoking status: Former Smoker     Packs/day: 0.30     Years: 20.00     Pack years: 6.00     Types: Cigarettes     Smokeless tobacco: Never Used     Tobacco comment: quit 3/2017   Substance and Sexual Activity     Alcohol use: No     Frequency: Never     Comment: rare     Drug use: No     Sexual activity: Not on file   Lifestyle     Physical activity:     Days per week: Not on file     Minutes per session: Not on file     Stress: Not on file   Relationships     Social connections:     Talks on phone: Not on file     Gets together: Not on file     Attends Scientologist service: Not on file     Active member of club or organization: Not on file     Attends meetings of clubs or organizations: Not on file     Relationship status: Not on file     Intimate partner violence:     Fear of current or ex partner: Not on file      Emotionally abused: Not on file     Physically abused: Not on file     Forced sexual activity: Not on file   Other Topics Concern     Parent/sibling w/ CABG, MI or angioplasty before 65F 55M? Not Asked   Social History Narrative     Not on file       CURRENT MEDICATIONS:  allopurinol (ZYLOPRIM) 100 MG tablet, Take 2 tablets (200 mg) by mouth daily  amiodarone (PACERONE) 200 MG tablet, Take 1 tablet (200 mg) by mouth daily  amoxicillin (AMOXIL) 500 MG capsule, Take 4 capsules (2,000 mg) by mouth once as needed (Take 4 capsules (2000mg), one hour before any dental cleanings or procedures)  aspirin (ASA) 81 MG chewable tablet, Take 1 tablet (81 mg) by mouth daily  Blood Glucose Monitoring Suppl (BLOOD GLUCOSE MONITOR SYSTEM) w/Device KIT, three times a week  bumetanide (BUMEX) 1 MG tablet, Take 2 tablets (2 mg) by mouth daily  colchicine (COLCRYS) 0.6 MG tablet, One tab 0.6 mg on Mon/Wed/Fri x 1 month and if no gout flare up, stop it  enoxaparin (LOVENOX) 100 MG/ML syringe, Inject 100 mg (1 ml) every 12 hours as directed by the Anticoagulation Clinic. (Patient not taking: Reported on 7/16/2020)  losartan (COZAAR) 25 MG tablet, Take 1 tablet (25 mg) by mouth daily  metoprolol succinate ER (TOPROL XL) 25 MG 24 hr tablet, Take 1.5 tablets (37.5 mg) by mouth daily  potassium chloride ER (KLOR-CON) 10 MEQ CR tablet, Take 2 tablets (20 mEq) by mouth 2 times daily  spironolactone (ALDACTONE) 25 MG tablet, TAKE 1.5 TABLETS (37.5 MG) BY MOUTH DAILY  tacrolimus (PROTOPIC) 0.1 % external ointment, Apply topically 2 times daily (Patient not taking: Reported on 7/16/2020)  traZODone (DESYREL) 100 MG tablet, TAKE 1 TABLET BY MOUTH EVERY DAY AT BEDTIME AS NEEDED FOR SLEEP (Patient not taking: Reported on 7/16/2020)  vitamin B1 (THIAMINE) 100 MG tablet, Take 1 tablet (100 mg) by mouth daily (Patient not taking: Reported on 7/16/2020)  warfarin ANTICOAGULANT (COUMADIN) 2 MG tablet, TAKE 1 & 1/2 TABLET BY MOUTH EACH DAY AT 6PM    No  current facility-administered medications on file prior to visit.       ROS:   CONSTITUTIONAL: Denies fever, chills,  or weight fluctuations.   HEENT: Denies vision changes and changes in speech.   CV: Refer to HPI.   PULMONARY:Refer to HPI.   GI:Denies nausea, vomiting. Bowel movements are regular.   :Denies urinary alterations, dysuria, urinary frequency, hematuria, and abnormal drainage.   EXT: See HPI  SKIN:Denies abnormal rashes or lesions.   MUSCULOSKELETAL:Denies upper or lower extremity weakness and pain.   NEUROLOGIC:Denies seizures, or upper or lower extremity paresthesia.     EXAM:  There were no vitals taken for this visit.    N/A phone visit    Labs - as reviewed in clinic with patient today:  CBC RESULTS:  Lab Results   Component Value Date    WBC 7.7 09/03/2020    RBC 4.72 09/03/2020    HGB 15.5 09/03/2020    HCT 45.2 09/03/2020    MCV 95.8 09/03/2020    MCH 32.8 09/03/2020    MCHC 34.3 09/03/2020    RDW 13.4 09/03/2020     09/03/2020     07/24/2020       CMP RESULTS:  Lab Results   Component Value Date     09/03/2020    POTASSIUM 4.0 09/03/2020    CHLORIDE 100 09/03/2020    CO2 24 09/03/2020    ANIONGAP 5 07/24/2020     (A) 09/03/2020    BUN 15 09/03/2020    CR 1.16 09/03/2020    GFRESTIMATED 66 09/03/2020    GFRESTBLACK 77 09/03/2020    ASHLEE 9.0 09/03/2020    BILITOTAL 0.7 09/03/2020    ALBUMIN 4.1 09/03/2020    ALKPHOS 84 09/03/2020    ALT 23 09/03/2020    AST 28 09/03/2020        INR RESULTS:  Lab Results   Component Value Date    INR 1.9 (A) 09/03/2020       Lab Results   Component Value Date    MAG 2.3 07/02/2020     Lab Results   Component Value Date    NTBNPI 1,970 (H) 10/27/2018     Lab Results   Component Value Date    NTBNP 2,244 (H) 12/20/2018     Diagnostics   7/2020 RHC  RA 10/12/10  RV 30/10  PA 30/19/25  PCWP 14  Cardiac output by Timmy: 3.4 L/min (indexed to 1.5 L/min/m2)  PVR: 3.24 Right sided filling pressures are mildly elevated.Left sided filling  pressures are normal. Mild elevated Pulmonary Hypertension.Reduced cardiac output level.Hemodynamic data has been modified in Epic per physician review.    6/17/2020 ICD check  In clinic device appointment conversion to remote device appointment to minimize COVID19 exposure for high risk patient populations.  Scheduled remote ICD transmission received and reviewed. Device transmission sent per MD orders. Patient has a [employer] single lead ICD. Normal ICD function. 211 ventricular episodes recorded. Available EGMs show regular ventricular rhythm, rates from 160-164 bpm, with morphology score 90-99%. No treatment indicated or delivered. Presenting EGM = Regular VS @ 110 bpm.  = 1%. Estimated battery longevity to LINCOLN = 12 years. Lead impedance trends appear stable. Patient notified of interrogation results via voicemail.  Plan for patient to return to clinic in 3 months as scheduled. GISSELLE Bahena RN     Remote ICD transmission    12/6/2019 ECHO  Interpretation Summary  Technically difficult study. Limited views obtained.     The rhythm is atrial tachycardia.  s/p HMIII LVAD. Speed is 5500 rpm. Doppler interrogation of the outflow graft  is normal. The inflow graft is not well seen. Doppler interrogation is  suboptimal.  The aortic valve remians closed. Trace AI.  Severely (EF 10-20%) reduced left ventricular function is present.  Moderate left ventricular dilation is present. LVIDd is 6.2 cm.  Right ventricular function cannot be assessed due to poor image quality.  The inferior vena cava was normal in size with preserved respiratory  variability.     Compared to prior study dated 1/15/19, the aortic insufficiency appears  improved. The RV is not well seen on this study. LV size is unchanged    Assessment and Plan:   Mr. Danielito Chu is a 63 yr old male with a history of NICM (LVEF at dong of 15%) s/p ICD (4/2017),  who underwent HM3 LVAD implant on 12/5/18 as DT (some history of mariajuana smoking, liver  disease but now BTT). He presents today for LVAD follow-up.    He has been doing well for the past few months. Labs are stable. Sounds euvolemic. No Vitals available d/t phone visit. No medication changes today.    He is having a small amount of driveline drainage. No leukocytosis. No skin changes. Will have him send a picture and go get a sterile culture locally. May need to start anbitoitcs.    # Chronic systolic heart failure secondary to NICM s/p 3 LVAD as BTT on 12/5/2018, listed as status 4.  Stage D. NYHA Class II.    Fluid status euvolemic, continue bumex 2 mg BID with extra 1 mg PRN  ACEi/ARB Losartan 25 mg daily  BB Metoprolol 37.5 mg daily  Aldosterone antagonist Spironolatone 37.5 mg daily  SCD prophylaxis ICD  NSAID use: Contraindicated  Sleep Apnea Evaluation: Did not discuss in clinic today.  BP: goal is 65-85, no measurement today given phone visit  LDH trends: 203, stable  Anticoagulation: warfarin INR goal 2-3 . 1.9 today  Antiplatelet: Aspirin 81 mg    VAD interrogation today: N/A phone visit    # Driveline Drainage . New in the last 3-4 weeks. Clear to yellow, small amounts. No skin changes, fevers/chills, or pain.  - Will send us picture  - Will work on getting sterile swab  - If not able to get swab will start abx Friday (ideally would get culture pre-antibiotics, has been stable x3 weeks)    # Hypertension: Controlled.  Continue metoprolol XL and losartan.   - Will call with MAP if able     # NSVT: Continue metoprolol and amiodarone.  - Due for ICD check 9/25/2020  - follows with EP for amiodarone monitoring    # Obesity BMI of 34.5 at last visit. He is successfully kept his weight under the threshold for transplant listing, but he is having difficulty losing any more weight and this is his goal.  - Discussed importance of weight loss again today    #CKD stage II  - Cr at baseline, continue current diuretics    Follow up :   - Driveline picture/swab in the next 48 hours  - Dr. ferrell in  person in 3 months  - LVAD NILAM in 6 months              PITER Oscar            CC  SELF, REFERRED

## 2020-09-09 NOTE — LETTER
"9/9/2020      RE: Danielito Chu  [address] [place] [address]       Dear Colleague,    Thank you for the opportunity to participate in the care of your patient, Danielito Chu, at the [facility] at [facility]. Please see a copy of my visit note below.    Telephone visit  Start time 12:29 PM  End time 12:55 PM    HPI:   Mr. Danielito Chu is a 63 yr old male with a history of NICM (LVEF at dong of 15%) s/p ICD (4/2017),  who underwent HM3 LVAD implant on 12/5/18 as DT (some history of mariajuana smoking, liver disease).       This visit  He has been feeling okay since his last visit. He thinks he maybe has a \"couple of pounds\" of fluid. He thinks his abdomen is bloated. No LE edema. No SOB at rest. With a lot of shopping especially in big stores like [employer] he gets tired, but he can do what he needs to get done. Carrying groceries into the house up the stairs can make him BAJWA. No orthopnea. No PND. No lightheadedness or dizziness except with very quick position changes.  Still having some palpitations, but much improved since starting the amiodarone.    In June he was having more dizziness, has been much improved since speed increase. No further episodes.    Weights have been overall stable. 239 in March. 241 lbs today. No BP cuff at home.     No blood in the urine or blood in the stool. No prolonged nosebleeds.    He had some drainage start on the driveline a few weeks ago. Clear to maybe light yellow. It is a little milky. No redness or pain.     No headaches. No stroke symptoms.    He is currently down at his house in [place].    His pump parameters have been stable. Speed is 5500. Flow is 4.8. PI 3.2. Power 4.7.  No audible alarms.    Cardiac Medications  ASA 81 mg daily  Coumadin  Amiodarone 200 mg daily  Bumex 2 mg BID He takes a one mg once or twice a week if he feels like he has extra fluid. Usually it is in the setting of a missed dose " the day prior.  KCl 20/10  Losartan 25 mg daily  Metoprolol succinate 37.5 mg daily  Spironolactone 37.5 mg daily    PAST MEDICAL HISTORY:  Past Medical History:   Diagnosis Date     Acute systolic congestive heart failure (H) 2017     Diabetes (H)      HTN (hypertension)      Hyperlipidemia      Liver disease      LVAD (left ventricular assist device) present (H)     HM 3      Marijuana abuse      Mitral regurgitation      Nocturnal oxygen desaturation 3/29/2018     Nonischemic cardiomyopathy (H) 2017     SHIRLEY (obstructive sleep apnea)      Pacemaker 2017    ICD 17     Situational mixed anxiety and depressive disorder        FAMILY HISTORY:  Family History   Problem Relation Age of Onset     Heart Failure Father          at age 86     Heart Failure Paternal Uncle          at 66      Myocardial Infarction Paternal Uncle          at age 62     Coronary Artery Disease Mother          during CABG at age 41       SOCIAL HISTORY:  Social History     Socioeconomic History     Marital status: Single     Spouse name: Not on file     Number of children: Not on file     Years of education: Not on file     Highest education level: Not on file   Occupational History     Not on file   Social Needs     Financial resource strain: Not on file     Food insecurity:     Worry: Not on file     Inability: Not on file     Transportation needs:     Medical: Not on file     Non-medical: Not on file   Tobacco Use     Smoking status: Former Smoker     Packs/day: 0.30     Years: 20.00     Pack years: 6.00     Types: Cigarettes     Smokeless tobacco: Never Used     Tobacco comment: quit 3/2017   Substance and Sexual Activity     Alcohol use: No     Frequency: Never     Comment: rare     Drug use: No     Sexual activity: Not on file   Lifestyle     Physical activity:     Days per week: Not on file     Minutes per session: Not on file     Stress: Not on file   Relationships     Social connections:     Talks  on phone: Not on file     Gets together: Not on file     Attends [employer] service: Not on file     Active member of club or organization: Not on file     Attends meetings of clubs or organizations: Not on file     Relationship status: Not on file     Intimate partner violence:     Fear of current or ex partner: Not on file     Emotionally abused: Not on file     Physically abused: Not on file     Forced sexual activity: Not on file   Other Topics Concern     Parent/sibling w/ CABG, MI or angioplasty before 65F 55M? Not Asked   Social History Narrative     Not on file       CURRENT MEDICATIONS:  allopurinol (ZYLOPRIM) 100 MG tablet, Take 2 tablets (200 mg) by mouth daily  amiodarone (PACERONE) 200 MG tablet, Take 1 tablet (200 mg) by mouth daily  amoxicillin (AMOXIL) 500 MG capsule, Take 4 capsules (2,000 mg) by mouth once as needed (Take 4 capsules (2000mg), one hour before any dental cleanings or procedures)  aspirin (ASA) 81 MG chewable tablet, Take 1 tablet (81 mg) by mouth daily  Blood Glucose Monitoring Suppl (BLOOD GLUCOSE MONITOR SYSTEM) w/Device KIT, three times a week  bumetanide (BUMEX) 1 MG tablet, Take 2 tablets (2 mg) by mouth daily  colchicine (COLCRYS) 0.6 MG tablet, One tab 0.6 mg on Mon/Wed/Fri x 1 month and if no gout flare up, stop it  enoxaparin (LOVENOX) 100 MG/ML syringe, Inject 100 mg (1 ml) every 12 hours as directed by the Anticoagulation Clinic. (Patient not taking: Reported on 7/16/2020)  losartan (COZAAR) 25 MG tablet, Take 1 tablet (25 mg) by mouth daily  metoprolol succinate ER (TOPROL XL) 25 MG 24 hr tablet, Take 1.5 tablets (37.5 mg) by mouth daily  potassium chloride ER (KLOR-CON) 10 MEQ CR tablet, Take 2 tablets (20 mEq) by mouth 2 times daily  spironolactone (ALDACTONE) 25 MG tablet, TAKE 1.5 TABLETS (37.5 MG) BY MOUTH DAILY  tacrolimus (PROTOPIC) 0.1 % external ointment, Apply topically 2 times daily (Patient not taking: Reported on 7/16/2020)  traZODone (DESYREL) 100 MG tablet,  TAKE 1 TABLET BY MOUTH EVERY DAY AT BEDTIME AS NEEDED FOR SLEEP (Patient not taking: Reported on 7/16/2020)  vitamin B1 (THIAMINE) 100 MG tablet, Take 1 tablet (100 mg) by mouth daily (Patient not taking: Reported on 7/16/2020)  warfarin ANTICOAGULANT (COUMADIN) 2 MG tablet, TAKE 1 & 1/2 TABLET BY MOUTH EACH DAY AT 6PM    No current facility-administered medications on file prior to visit.       ROS:   CONSTITUTIONAL: Denies fever, chills,  or weight fluctuations.   HEENT: Denies vision changes and changes in speech.   CV: Refer to HPI.   PULMONARY:Refer to HPI.   GI:Denies nausea, vomiting. Bowel movements are regular.   :Denies urinary alterations, dysuria, urinary frequency, hematuria, and abnormal drainage.   EXT: See HPI  SKIN:Denies abnormal rashes or lesions.   MUSCULOSKELETAL:Denies upper or lower extremity weakness and pain.   NEUROLOGIC:Denies seizures, or upper or lower extremity paresthesia.     EXAM:  There were no vitals taken for this visit.    N/A phone visit    Labs - as reviewed in clinic with patient today:  CBC RESULTS:  Lab Results   Component Value Date    WBC 7.7 09/03/2020    RBC 4.72 09/03/2020    HGB 15.5 09/03/2020    HCT 45.2 09/03/2020    MCV 95.8 09/03/2020    MCH 32.8 09/03/2020    MCHC 34.3 09/03/2020    RDW 13.4 09/03/2020     09/03/2020     07/24/2020       CMP RESULTS:  Lab Results   Component Value Date     09/03/2020    POTASSIUM 4.0 09/03/2020    CHLORIDE 100 09/03/2020    CO2 24 09/03/2020    ANIONGAP 5 07/24/2020     (A) 09/03/2020    BUN 15 09/03/2020    CR 1.16 09/03/2020    GFRESTIMATED 66 09/03/2020    GFRESTBLACK 77 09/03/2020    ASHLEE 9.0 09/03/2020    BILITOTAL 0.7 09/03/2020    ALBUMIN 4.1 09/03/2020    ALKPHOS 84 09/03/2020    ALT 23 09/03/2020    AST 28 09/03/2020        INR RESULTS:  Lab Results   Component Value Date    INR 1.9 (A) 09/03/2020       Lab Results   Component Value Date    MAG 2.3 07/02/2020     Lab Results   Component Value  Date    NTBNPI 1,970 (H) 10/27/2018     Lab Results   Component Value Date    NTBNP 2,244 (H) 12/20/2018     Diagnostics   7/2020 RHC  RA 10/12/10  RV 30/10  PA 30/19/25  PCWP 14  Cardiac output by Timmy: 3.4 L/min (indexed to 1.5 L/min/m2)  PVR: 3.24 Right sided filling pressures are mildly elevated.Left sided filling pressures are normal. Mild elevated Pulmonary Hypertension.Reduced cardiac output level.Hemodynamic data has been modified in Epic per physician review.    6/17/2020 ICD check  In clinic device appointment conversion to remote device appointment to minimize COVID19 exposure for high risk patient populations.  Scheduled remote ICD transmission received and reviewed. Device transmission sent per MD orders. Patient has a Skigit single lead ICD. Normal ICD function. 211 ventricular episodes recorded. Available EGMs show regular ventricular rhythm, rates from 160-164 bpm, with morphology score 90-99%. No treatment indicated or delivered. Presenting EGM = Regular VS @ 110 bpm.  = 1%. Estimated battery longevity to LINCOLN = 12 years. Lead impedance trends appear stable. Patient notified of interrogation results via voicemail.  Plan for patient to return to clinic in 3 months as scheduled. GISSELLE Bahena RN     Remote ICD transmission    12/6/2019 ECHO  Interpretation Summary  Technically difficult study. Limited views obtained.     The rhythm is atrial tachycardia.  s/p HMIII LVAD. Speed is 5500 rpm. Doppler interrogation of the outflow graft  is normal. The inflow graft is not well seen. Doppler interrogation is  suboptimal.  The aortic valve remians closed. Trace AI.  Severely (EF 10-20%) reduced left ventricular function is present.  Moderate left ventricular dilation is present. LVIDd is 6.2 cm.  Right ventricular function cannot be assessed due to poor image quality.  The inferior vena cava was normal in size with preserved respiratory  variability.     Compared to prior study dated 1/15/19, the  aortic insufficiency appears  improved. The RV is not well seen on this study. LV size is unchanged    Assessment and Plan:   Mr. Danielito Chu is a 63 yr old male with a history of NICM (LVEF at dong of 15%) s/p ICD (4/2017),  who underwent HM3 LVAD implant on 12/5/18 as DT (some history of mariajuana smoking, liver disease but now BTT). He presents today for LVAD follow-up.    He has been doing well for the past few months. Labs are stable. Sounds euvolemic. No Vitals available d/t phone visit. No medication changes today.    He is having a small amount of driveline drainage. No leukocytosis. No skin changes. Will have him send a picture and go get a sterile culture locally. May need to start anbitoitcs.    # Chronic systolic heart failure secondary to NICM s/p 3 LVAD as BTT on 12/5/2018, listed as status 4.  Stage D. NYHA Class II.    Fluid status euvolemic, continue bumex 2 mg BID with extra 1 mg PRN  ACEi/ARB Losartan 25 mg daily  BB Metoprolol 37.5 mg daily  Aldosterone antagonist Spironolatone 37.5 mg daily  SCD prophylaxis ICD  NSAID use: Contraindicated  Sleep Apnea Evaluation: Did not discuss in clinic today.  BP: goal is 65-85, no measurement today given phone visit  LDH trends: 203, stable  Anticoagulation: warfarin INR goal 2-3 . 1.9 today  Antiplatelet: Aspirin 81 mg    VAD interrogation today: N/A phone visit    # Driveline Drainage . New in the last 3-4 weeks. Clear to yellow, small amounts. No skin changes, fevers/chills, or pain.  - Will send us picture  - Will work on getting sterile swab  - If not able to get swab will start abx Friday (ideally would get culture pre-antibiotics, has been stable x3 weeks)    # Hypertension: Controlled.  Continue metoprolol XL and losartan.   - Will call with MAP if able     # NSVT: Continue metoprolol and amiodarone.  - Due for ICD check 9/25/2020  - follows with EP for amiodarone monitoring    # Obesity BMI of 34.5 at last visit. He is successfully kept his  "weight under the threshold for transplant listing, but he is having difficulty losing any more weight and this is his goal.  - Discussed importance of weight loss again today    #CKD stage II  - Cr at baseline, continue current diuretics    Follow up :   - Driveline picture/swab in the next 48 hours  - Dr. ferrell in person in 3 months  - LVAD NILAM in 6 months        PITER Oscar    CC  SELF, REFERRED      Danielito Chu is a 65 year old male who is being evaluated via a billable telephone visit.      The patient has been notified of following:     \"This telephone visit will be conducted via a call between you and your physician/provider. We have found that certain health care needs can be provided without the need for a physical exam.  This service lets us provide the care you need with a short phone conversation.  If a prescription is necessary we can send it directly to your pharmacy.  If lab work is needed we can place an order for that and you can then stop by our lab to have the test done at a later time.    Telephone visits are billed at different rates depending on your insurance coverage. During this emergency period, for some insurers they may be billed the same as an in-person visit.  Please reach out to your insurance provider with any questions.    If during the course of the call the physician/provider feels a telephone visit is not appropriate, you will not be charged for this service.\"    Patient has given verbal consent for Telephone visit?  Yes    What phone number would you like to be contacted at? [phone]    How would you like to obtain your AVS? MyChart            "

## 2020-09-11 NOTE — PROGRESS NOTES
ANTICOAGULATION FOLLOW-UP CLINIC VISIT    Patient Name:  Danielito Chu  Date:  2020  Contact Type:  Telephone    SUBJECTIVE:  Patient Findings         Clinical Outcomes     Negatives:   Major bleeding event, Thromboembolic event, Anticoagulation-related hospital admission, Anticoagulation-related ED visit, Anticoagulation-related fatality           OBJECTIVE    Recent labs: (last 7 days)     20   INR 2.9*       ASSESSMENT / PLAN  INR assessment THER    Recheck INR In: 3 WEEKS    INR Location Outside lab      Anticoagulation Summary  As of 2020    INR goal:   2.0-3.0   TTR:   77.7 % (1 y)   INR used for dosin.9 (2020)   Warfarin maintenance plan:   2 mg (2 mg x 1) every day   Full warfarin instructions:   2 mg every day   Weekly warfarin total:   14 mg   No change documented:   Giovanni Ordonez, [facility]   Plan last modified:   Bri Becerril RN (3/18/2020)   Next INR check:   2020   Priority:   Critical   Target end date:   Indefinite    Indications    LVAD (left ventricular assist device) present (H) [Z95.811]  Long term (current) use of anticoagulants [Z79.01]  Chronic systolic congestive heart failure (H) [I50.22]             Anticoagulation Episode Summary     INR check location:       Preferred lab:       Send INR reminders to:   [facility]    Comments:   [place] [facility] [address] [phone]/Fax [phone]  LVAD plced 18 HM 3 ASA 81mg Daily Jantoven 2mg tabs Lab [phone]/[phone]  Call pt at [phone] Emphasize next INR date with pt++Send email each time++  Pt is home .      Anticoagulation Care Providers     Provider Role Specialty Phone number    Lorena Watkins MD Referring Cardiology [phone]            See the Encounter Report to view Anticoagulation Flowsheet and Dosing Calendar (Go to Encounters tab in chart review, and find the Anticoagulation Therapy Visit)    Spoke with patient. Gave them their lab results  and new warfarin recommendation.  No changes in health, medication, or diet. No missed doses, no falls. No signs or symptoms of bleed or clotting.      Giovanni Ordonez, [facility]

## 2020-09-18 NOTE — PROGRESS NOTES
I called Fabiano to follow up on his driveline site symptoms. He said he is one week through his 2 week course of doxycycline. The drainage has stopped and the site is not red.    Fabiano had some questions about his exercise tolerance. He has symptoms of getting tired quicker than he thinks he should. I told him that we would [facility] his way back from [place] to [place]. He said he'd make an appointment with Dr. Watkins or Shania for mid-October. His weight is around his baseline now at 241lbs. He said his VAD numbers are stable and baseline.

## 2020-09-21 NOTE — PROGRESS NOTES
D:  Rcvd page from Dr. Albright, a doctor in [place], [place].  He advised me that patient was in their ER being worked up for atypical chest pain.  MD requesting recommendations for additional workup.   I:  MD given contact information for patient placement/Cards 2 oncall team.  P:  Natalee Womack verbalized understanding of the instructions given.  Will call VAD coordinator with further needs and questions.

## 2020-09-22 NOTE — PROGRESS NOTES
I called Fabiano back today after he was in the ED at [facility] in [place] yesterday for back pain. He said they consulted with our attending, Dr. Mondragon and discharged him home with oxycodone. Fabiano said he is seeing a new PCP in [place] today.    Fabiano said he took extra ASA last night when he did not want to wait at the [facility] for the oxycodone script. I reminded Fabiano that he should NOT take aspirin containing products for any reason other than the 81mg of ASA that are prescribed for his him as antiplatelet therapy. Fabiano said he knew he should not take extra ASA but in this case he researched it and decided to take it. I told them that he should not under any circumstances take extra aspirin. For pain he can take Tylenol or other pain meds prescribed by his PCP but not aspirin. I told Fabiano to watch for any signs of bleeding and call the VAD coordinator on call with any concerns.    I gave Fabiano the [facility] VAD on call number. Fabiano said he has a new phone and lost this number. I encouraged Fabiano to call the VAD on-call number when he needs to talk to a VAD coordinator instead of trying to email his primary coordinator.

## 2020-09-25 NOTE — TELEPHONE ENCOUNTER
Pt calls reporting that he hurt his back and the MD down in [place] prescribed pt Oxycontin PRN and Prednisone for 6-7 days (first dose was Thursday 9/24). Writer recommend pt to get an INR on Monday 9/28 at [facility] (we will see the INR results on Tuesday) per Prednisone can interact with Warfarin. Pt reports that in the past he has been on Prednisone due to his gout and has had any issues. Writer left Warfarin dosing alone and faxed a [facility] standing order to [facility].

## 2020-09-29 NOTE — PROGRESS NOTES
ANTICOAGULATION FOLLOW-UP CLINIC VISIT    Patient Name:  Danielito Chu  Date:  2020  Contact Type:  Telephone    SUBJECTIVE:  Patient Findings     Positives:   Change in medications (-finished antibiotic regimen.  Will be done with prednisone in a couple days.)             OBJECTIVE    Recent labs: (last 7 days)     20   INR 4.1*       ASSESSMENT / PLAN  INR assessment SUPRA    Recheck INR In: 2 WEEKS    INR Location Outside lab      Anticoagulation Summary  As of 2020    INR goal:   2.0-3.0   TTR:   73.0 % (1 y)   INR used for dosin.1! (2020)   Warfarin maintenance plan:   2 mg (2 mg x 1) every day   Full warfarin instructions:   : 1 mg; 10/1: 1 mg; Otherwise 2 mg every day   Weekly warfarin total:   14 mg   Plan last modified:   Bri Becerril RN (3/18/2020)   Next INR check:   10/13/2020   Priority:   Critical   Target end date:   Indefinite    Indications    LVAD (left ventricular assist device) present (H) [Z95.811]  Long term (current) use of anticoagulants [Z79.01]  Chronic systolic congestive heart failure (H) [I50.22]             Anticoagulation Episode Summary     INR check location:       Preferred lab:       Send INR reminders to:   [facility]    Comments:   [place] [facility] [address] [facility] [phone]/Fax [phone]  LVAD plced 18 HM 3 ASA 81mg Daily Jantoven 2mg tabs Lab [phone]/[phone]  Call pt at [phone] Emphasize next INR date with pt++Send email each time++  Pt is home .      Anticoagulation Care Providers     Provider Role Specialty Phone number    Lorena Watkins MD Referring Cardiology [phone]            See the Encounter Report to view Anticoagulation Flowsheet and Dosing Calendar (Go to Encounters tab in chart review, and find the Anticoagulation Therapy Visit)  Spoke with patient.  He had already taken his coumadin this am, so will make a dose adjustment tomorrow and Thursday.    Lorena PAYAN  ARAVIND Kamara

## 2020-10-01 NOTE — PROGRESS NOTES
Fabiano called to say he finished up the 14 day course of doxycycline a few days ago. The drainage that he had from the driveline has ceased. The site is not red or tender.    I advised Fabiano to call the vad coordinator on call with any increased drainage or other signs of infection.    Fabiano is also following up with his [place] PCP for his back pain. He said it is mostly better, too, but he bent the wrong today aggravating it again.

## 2020-10-05 NOTE — TELEPHONE ENCOUNTER
Fabiano left a voicemail stating he had his INR drawn at [facility] in [place] today, 10/5/2020.  Spoke with [facility] at 4:50 PM and was told the INR is pending.  It should be resulted by tomorrow morning, 10/6/2020.  In basket message sent to look for INR on Tuesday.   Spoke with Fabiano.  He states he cut his fingernail yesterday and had some trouble with it bleeding, so that is why he went in today for the INR.  He has not had any trouble today with bleeding. He also reports he took 1 mg of warfarin yesterday, calendar updated.   Jeanette Bryant RN

## 2020-10-06 NOTE — PROGRESS NOTES
ANTICOAGULATION FOLLOW-UP CLINIC VISIT    Patient Name:  Danielito Chu  Date:  10/6/2020  Contact Type:  Telephone    SUBJECTIVE:  Patient Findings     Positives:  Change in medications    Comments:  Pt reports he has 3-4 more days of Prednisone and is taking OxyContin 5mg BID. Denies any other signs and symptoms of bleeding. Pt will go in on Thursday for an INR and we should get the results on Friday.          Clinical Outcomes     Comments:  Pt reports he has 3-4 more days of Prednisone and is taking OxyContin 5mg BID. Denies any other signs and symptoms of bleeding. Pt will go in on Thursday for an INR and we should get the results on Friday.             OBJECTIVE    Recent labs: (last 7 days)     10/05/20   INR 3.7*       ASSESSMENT / PLAN  INR assessment SUPRA    Recheck INR In: 3 DAYS    INR Location Outside lab      Anticoagulation Summary  As of 10/6/2020    INR goal:  2.0-3.0   TTR:  71.1 % (1 y)   INR used for dosing:  3.7 (10/5/2020)   Warfarin maintenance plan:  2 mg (2 mg x 1) every day   Full warfarin instructions:  10/6: 1 mg; Otherwise 2 mg every day   Weekly warfarin total:  14 mg   Plan last modified:  Bri Becerril RN (3/18/2020)   Next INR check:  10/9/2020   Priority:  Critical   Target end date:  Indefinite    Indications    LVAD (left ventricular assist device) present (H) [Z95.811]  Long term (current) use of anticoagulants [Z79.01]  Chronic systolic congestive heart failure (H) [I50.22]             Anticoagulation Episode Summary     INR check location:      Preferred lab:      Send INR reminders to:  [facility]    Comments:  [place] 4/27-5/8 [facility] [address] [facility] [phone]/Fax [phone]  LVAD plced 12/5/18 HM 3 ASA 81mg Daily Jantoven 2mg tabs Lab [phone]/[phone]  Call pt at [phone] Emphasize next INR date with pt++Send email each time++  Pt is home .      Anticoagulation Care Providers     Provider Role Specialty Phone number    June  Lorena Lindsey MD Referring Cardiology [phone]            See the Encounter Report to view Anticoagulation Flowsheet and Dosing Calendar (Go to Encounters tab in chart review, and find the Anticoagulation Therapy Visit)    Spoke with patient. Gave them their lab results and new warfarin recommendation.  No changes in health, medication, or diet. No missed doses, no falls. No signs or symptoms of bleed or clotting.     Patient had LVAD placed on:   12/5/18  Type of LVAD: HM 3  Patient's current Aspirin dose: ASA 81mg Daily  LVAD Protocol followed: Yes   If Not Followed Explanation:  N/A    Kristie Fernandez RN

## 2020-10-09 NOTE — TELEPHONE ENCOUNTER
Pt reports he was unable to get lab due to being full. Pt was able to schedule labs for Monday 10/12 and we should see the results on Tuesday 10/13. Will have pt continue with 2mg daily.

## 2020-10-13 NOTE — PROGRESS NOTES
10/20/20 Faxed orders to [facility] at [phone] per patient request.  He is visiting his sister in [place], [place].  Fabiano will call us when he goes into the lab, so the [facility] will be able to look up results. ARAVIND Barnard            ANTICOAGULATION FOLLOW-UP CLINIC VISIT    Patient Name:  Danielito Chu  Date:  10/13/2020  Contact Type:  Telephone    SUBJECTIVE:  Patient Findings     Positives:  Change in medications    Comments:  Pt finished his Prednisone on Saturday 10/10. Pt was questioning writers dosing and writer had to educate pt about what it means to take less Warfarin vs more Warfarin. Pt thought it was the opposite.         Clinical Outcomes     Comments:  Pt finished his Prednisone on Saturday 10/10. Pt was questioning writers dosing and writer had to educate pt about what it means to take less Warfarin vs more Warfarin. Pt thought it was the opposite.            OBJECTIVE    Recent labs: (last 7 days)     10/12/20   INR 3.9*       ASSESSMENT / PLAN  INR assessment SUPRA    Recheck INR In: 1 WEEK    INR Location Outside lab      Anticoagulation Summary  As of 10/13/2020    INR goal:  2.0-3.0   TTR:  69.1 % (1 y)   INR used for dosing:  3.9 (10/12/2020)   Warfarin maintenance plan:  2 mg (2 mg x 1) every day   Full warfarin instructions:  10/13: 1 mg; 10/15: 1 mg; Otherwise 2 mg every day   Weekly warfarin total:  14 mg   Plan last modified:  Bri Becerril RN (3/18/2020)   Next INR check:  10/20/2020   Priority:  Critical   Target end date:  Indefinite    Indications    LVAD (left ventricular assist device) present (H) [Z95.811]  Long term (current) use of anticoagulants [Z79.01]  Chronic systolic congestive heart failure (H) [I50.22]             Anticoagulation Episode Summary     INR check location:      Preferred lab:      Send INR reminders to:  [facility]    Comments:  [place] 4/27-5/8 [facility]  [phone]/Fax [phone]  LVAD plced 12/5/18 HM 3 ASA  81mg Daily Jantoven 2mg tabs Lab [id number]  Call pt at [phone] Emphasize next INR date with pt++Send email each time++  Pt is home .      Anticoagulation Care Providers     Provider Role Specialty Phone number    Lorena Watkins MD Referring Cardiology [phone]            See the Encounter Report to view Anticoagulation Flowsheet and Dosing Calendar (Go to Encounters tab in chart review, and find the Anticoagulation Therapy Visit)    Spoke with patient. Gave them their lab results and new warfarin recommendation.  No changes in health, medication, or diet. No missed doses, no falls. No signs or symptoms of bleed or clotting.     Patient had LVAD placed on:   12/5/18  Type of LVAD: HM 3  Patient's current Aspirin dose: ASA 81mg Daily  LVAD Protocol followed: Yes   If Not Followed Explanation:  N/A    Kristie Fernandez RN

## 2020-10-14 NOTE — PROGRESS NOTES
I called Fabiano to check in. He is coming back from [place] in the next day or so. He said his back pain is mostly gone taking tylenol. He agreed to come to his 11/12 appointment with Dr. Watkins in person.

## 2020-10-21 NOTE — TELEPHONE ENCOUNTER
Pt called reporting he just got his INR drawn at [facility] at [phone] and that the clinic was going to send off his blood to be resulted. Writer let pt know that if we don't get the INR resulted tomorrow we will call the above ph# to get results. Pt communicated understanding.

## 2020-10-22 NOTE — PROGRESS NOTES
Writer calls [facility] [phone] and speaks with lab.  Writer confirms fax number as [phone].   confirms that this is their fax number and patient was not in for an INR draw.

## 2020-10-26 NOTE — TELEPHONE ENCOUNTER
Spoke to patient, he has come back in town from [place].  Informed him that he would be re-activated today on the transplant list.    Status 4 Heart Extension Complete 10/26/20    This patient meets status 4 criteria as evidenced by:     Dischargeable LVAD without discretionary 30 days   Inotropes without hemodynamic monitoring     Patient's primary cardiologist and/or attending physician is in agreement with this plan.      Status 4 Extension due 01/24/2021

## 2020-10-26 NOTE — LETTER
October 26, 2020    Danielito Chu  [address] [place] [address]      Dear Mr. Chu,    This letter is sent to notify you that your listing status was changed from Status 7 to Status 4 on the heart transplant waitlist at the [facility].  Your status was changed because you have returned from your travels.    During this waiting period, we may still request periodic laboratory tests with your primary physician.  It will be your responsibility to remind your physician to forward your results to the Transplant Office.    We still need to be kept informed of any changes such as:    o changes in your insurance coverage  o change in your phone number, address or emergency contact  o significant changes in your health  o significant infections (such as pneumonia or abscesses)  o blood transfusions  o any condition which requires hospitalization or surgery    Sincerely,        Heart Transplant Program    Enclosures: UNOS Letter                            The Organ Procurement and Transplantation Network  Toll-free patient services line:     Your resource for organ transplant information    If you have a question regarding your own medical care, you always should call your transplant hospital first. However, for general organ transplant-related information, you can call the Organ Procurement and Transplantation Network (OPTN) toll-free patient services line at [phone]. Anyone, including potential transplant candidates, candidates, recipients, family members, friends, living donors, and donor family members, can call this number to:          Talk about organ donation, living donation, the transplant process, the donation process, and transplant policies.    Get a free patient information kit with helpful booklets, waiting list and transplant information, and a list of all transplant hospitals.    Ask questions about the OPTN website (https://optn.transplant.hrsa.gov/), the  United Network for Organ Sharing s (UNOS) website (https://unos.org/), or the UNOS website for living donors and transplant recipients. (https://www.transplantliving.org/).    Learn how the OPTN can help you.    Talk about any concerns that you may have with a transplant hospital.    The [facility] transplant system, the OPTN, is managed under federal contract by the United Network for Organ Sharing (UNOS), which is a non-profit charitable organization. The OPTN helps create and define organ sharing policies that make the best use of donated organs. This process continuously evaluating new advances and discoveries so policies can be adapted to best serve patients waiting for transplants. To do so, the OPTN works closely with transplant professionals, transplant patients, transplant candidates, donor families, living donors, and the public. All transplant programs and organ procurement organizations throughout the country are OPTN members and are obligated to follow the policies the OPTN creates for allocating organs.    The OPTN also is responsible for:      Providing educational material for patients, the public, and professionals.    Raising awareness of the need for donated organs and tissue.    Coordinating organ procurement, matching, and placement.    Collecting information about every organ transplant and donation that occurs in the [place].    Remember, you should contact your transplant hospital directly if you have questions or concerns about your own medical care including medical records, work-up progress, and test results.    We are not your transplant hospital, and our staff will not be able to answer questions about your case, so please keep your transplant hospital s phone number handy.    However, while you research your transplant needs and learn as much as you can about transplantation and donation, we welcome your call to our toll-free patient services line at [phone].          Updated 4/1/2019

## 2020-10-26 NOTE — TELEPHONE ENCOUNTER
Patient Call: ivonne wanted to let you know he is back in town.  Ready to get back on the list           Call back needed? Yes    Return Call Needed  Same as documented in contacts section  When to return call?: Same day: Route High Priority

## 2020-10-30 NOTE — PROGRESS NOTES
ANTICOAGULATION FOLLOW-UP CLINIC VISIT    Patient Name:  Danielito Chu  Date:  10/30/2020  Contact Type:  Telephone    SUBJECTIVE:  Patient Findings     Comments:  Received INR results from [facility] on 10/30.  INR on 10/21 was 2.22.  Updated calendar with dosing and date of next INR.  Fabiano is back in [place].        Clinical Outcomes     Comments:  Received INR results from [facility] on 10/30.  INR on 10/21 was 2.22.  Updated calendar with dosing and date of next INR.  Fabiano is back in [place].           OBJECTIVE    No results for input(s): INR, [id number], [id number], 2AGN, F2 in the last 168 hours.    ASSESSMENT / PLAN  INR assessment THER    Recheck INR In: 2 WEEKS    INR Location Outside lab      Anticoagulation Summary  As of 10/30/2020    INR goal:  2.0-3.0   TTR:  70.5 % (11.9 mo)   INR used for dosin.22 (10/21/2020)   Warfarin maintenance plan:  1 mg (2 mg x 0.5) every Tue, Thu; 2 mg (2 mg x 1) all other days   Full warfarin instructions:  1 mg every Tue, Thu; 2 mg all other days   Weekly warfarin total:  12 mg   Plan last modified:  Fady Avelar RN (10/30/2020)   Next INR check:  2020   Priority:  Critical   Target end date:  Indefinite    Indications    LVAD (left ventricular assist device) present (H) [Z95.811]  Long term (current) use of anticoagulants [Z79.01]  Chronic systolic congestive heart failure (H) [I50.22]             Anticoagulation Episode Summary     INR check location:      Preferred lab:      Send INR reminders to:  [facility]    Comments:  [place] [facility] [phone]/Fax [phone]  LVAD plced 18 HM 3 ASA 81mg Daily Jantoven 2mg tabs Lab [phone]/[phone]  Call pt at [phone] Emphasize next INR date with pt++Send email each time++  Pt is home .      Anticoagulation Care Providers     Provider Role Specialty Phone number    Lorena Watkins MD Referring Advanced Heart Failure and  Transplant Cardiology [phone]            See the Encounter Report to view Anticoagulation Flowsheet and Dosing Calendar (Go to Encounters tab in chart review, and find the Anticoagulation Therapy Visit)    INR/CFX/F2 RESULT:INR on 10/21 is 2.22.  Received in [facility] on 10/30 (lab had misspelled patient's last name).    ASSESSMENT:Spoke with patient. Gave them their lab results and new warfarin recommendation.  No changes in health, medication, or diet. No missed doses, no falls. No signs or symptoms of bleed or clotting.    DOSING ADJUSTMENT:Fabiano has been taking Warfarin differently than recorded.  Updated maintenance dose.  1mg on Tue, Thur, 2mg all other days.    NEXT INR/FACTOR X OR FACTOR II: 11/12    PROTOCOL FOLLOWED:LVAD goal 2-3  Patient had LVAD placed on:   12/5/18  Type of LVAD: HM 3  Patient's current Aspirin dose: 81mg  LVAD Protocol followed:  Yes.   If Not Followed Explanation:  Fady Etienne RN

## 2020-11-11 NOTE — TELEPHONE ENCOUNTER
7/11/2022              [facility]        [id number]         To Whom It May Concern,  Galen Desir was seen today with recurrent fevers. Please allow his father to work from home for the next 3 months. Sincerely,    Ronnell Layton. Lian & Gagandeep Hernandez DO  [address] [place] [address], [address]  [facility] Night [phone] [phone]        Document electronically generated by:  Alejandra King DO I spoke with Fabiano and he stated he is having his PRA's drawn 11/12/2020 at [facility]. He says he has mailers. Orders are in epic.

## 2020-11-12 NOTE — TELEPHONE ENCOUNTER
11/12/20  Reviewed [facility] Care Everywhere notes.  Patient has changes to medication.  Requested a call back from Fabiano.  Did not see results for an INR today.    Fady Ibanez RN

## 2020-11-20 NOTE — TELEPHONE ENCOUNTER
Pt called to say that he is having back pain issues, and is unable to get in for an INR today.  He will try to do it next week.

## 2020-11-23 NOTE — TELEPHONE ENCOUNTER
11/23/20 Updated calendar.  Fabiano calling, he will go into the lab tomorrow.     ARAVIND Barnard

## 2020-11-24 NOTE — PROGRESS NOTES
ANTICOAGULATION FOLLOW-UP CLINIC VISIT    Patient Name:  Danielito Chu  Date:  11/24/2020  Contact Type:  Telephone    SUBJECTIVE:  Patient Findings     Positives:  Change in medications (Taking tylenol for back pain management)             OBJECTIVE    Recent labs: (last 7 days)     11/24/20   INR 3.9*       ASSESSMENT / PLAN  INR assessment SUPRA    Recheck INR In: 5 DAYS    INR Location Outside lab      Anticoagulation Summary  As of 11/24/2020    INR goal:  2.0-3.0   TTR:  66.3 % (1 y)   INR used for dosing:  3.9 (11/24/2020)   Warfarin maintenance plan:  1 mg (2 mg x 0.5) every Tue, Thu; 2 mg (2 mg x 1) all other days   Full warfarin instructions:  11/24: 1 mg; 11/25: 1 mg; Otherwise 1 mg every Tue, Thu; 2 mg all other days   Weekly warfarin total:  12 mg   Plan last modified:  Lorena Kamara RN (11/24/2020)   Next INR check:  11/30/2020   Priority:  Critical   Target end date:  Indefinite    Indications    LVAD (left ventricular assist device) present (H) [Z95.811]  Long term (current) use of anticoagulants [Z79.01]  Chronic systolic congestive heart failure (H) [I50.22]             Anticoagulation Episode Summary     INR check location:      Preferred lab:      Send INR reminders to:  [facility]    Comments:  [place] 4/27-5/8 [facility] [phone]/Fax [phone]  LVAD plced 12/5/18 HM 3 ASA 81mg Daily Jantoven 2mg tabs Lab [phone]/[phone]  Call pt at [phone] Emphasize next INR date with pt++Send email each time++  Pt is home .      Anticoagulation Care Providers     Provider Role Specialty Phone number    Lorena Watkins MD Referring Advanced Heart Failure and Transplant Cardiology [phone]            See the Encounter Report to view Anticoagulation Flowsheet and Dosing Calendar (Go to Encounters tab in chart review, and find the Anticoagulation Therapy Visit)  Spoke with patient.  Patient had LVAD placed on:   12/5/18  Type of LVAD:  HM3  Patient's current Aspirin dose: 81mg  LVAD Protocol followed:  Yes  Lorena Kamara RN

## 2020-11-24 NOTE — TELEPHONE ENCOUNTER
I called Danielito and he was going to go in for pra's today. He was supposed to go prior, but he was taking medication for his back problems, and did not want to drive while taking prescription drugs.

## 2020-11-25 NOTE — PROGRESS NOTES
Pt paged VAD coordinator on call today to report exposure to COVID19. He stated his  called him yesterday to report she has to quarantine due to a positive result. Pt woke up this morning feelng tired and chilled. He is wondering how to proceed. Instructed pt to contact PCP for assessment and likely testing. Pt will call PCP.

## 2020-11-30 NOTE — TELEPHONE ENCOUNTER
Called to remind Fabiano he is due for an INR.  He states he is not feeling well--congested, fever.  He went in to the clinic today and was tested for Covid.  He will not get an INR until he gets the results of the Covid test.  He will continue to take warfarin 1 mg TuTh and 2 mg all other days of the week.  Will follow up with Fabiano later this week.  Jeanette Bryant RN

## 2020-12-02 NOTE — PROGRESS NOTES
I called Fabiano to check in. He said he is COVID positive as of 11/30. He said he had a mild fever and cold symptoms for about a day on 11/28 but otherwise he has been feeling good. I told him to keep isolating at home, drink extra fluids if he has a fever, and to seek medical attention at his local ED if he has increased difficulty breathing.    [facility] appointment / tests / labs on 12/18

## 2020-12-03 PROBLEM — U07.1 COVID-19: Status: ACTIVE | Noted: 2020-01-01

## 2020-12-03 NOTE — PHARMACY-ANTICOAGULATION SERVICE
Clinical Pharmacy - Warfarin Dosing Consult     Pharmacy has been consulted to manage this patient s warfarin therapy.  Indication: LVAD/RVAD  Therapy Goal: INR 2-3  OP Anticoag Clinic: [facility]  Warfarin Prior to Admission: Yes  Warfarin PTA Regimen: 1mg Tues/Thurs, 2mg AOD  Significant drug interactions: amiodarone  Recent documented change in oral intake/nutrition: Yes    INR   Date Value Ref Range Status   12/03/2020 4.25 (H) 0.86 - 1.14 Final   11/24/2020 3.9 (A) 0.90 - 1.10 Final     Factor 2 Assay   Date Value Ref Range Status   10/27/2018 70 60 - 140 % Final     Comment:     The Factor 2 activity level is not a screening test for the Prothrombin [id number]   mutation.       Recommend no dose of warfarin today as INR is 4.25.  Pharmacy will monitor Danielito Chu daily and order warfarin doses to achieve specified goal.      Please contact pharmacy as soon as possible if the warfarin needs to be held for a procedure or if the warfarin goals change.

## 2020-12-03 NOTE — H&P
MEDICAL ICU H&P  12/03/2020    Date of Hospital Admission: 12/03/20  Date of ICU Admission: 12/03/20  Reason for Critical Care Admission: acute hypoxic respiratory failure  Date of Service (when I saw the patient): 12/03/2020    ASSESSMENT: Danielito Chu is a 65 year old male with a history of NICM (LVEF at dong of 15%) s/p ICD (4/2017), who underwent HM3 LVAD implant on 12/2018 (initially as DT given some history of [facility], liver disease but now BTT) admitted 12/3 as transfer from [place] ED for ARHF 2/2 COVID pneumonia.    PLAN:    Neuro:  # No acute concerns    Pulmonary:  # Acute hypoxic respiratory failure 2/2 COVID pneumonia  Exposed 11/24, tested + 11/30. Was not having cough or shortness of breath but did check O2 sat's with home SpO2 probe which revealed mid 80s on 12/2 which lead him to present to [facility] ED. Arrived here on oxymask 6L sating  96%, maintaining saturations during conversation even with oximask off.   - CXR with bilateral upper lobe predominant interstitial and airspace opacities, small lung volumes  - ABG  - wean FiO2 to keep sats >92%  - Dexamethasone 6 mg x10 days (12/3-12/12)  - Remdesivir 200 mg IV now + 100 mg x4d (12/3-12/7)    Cardiovascular:  # Chronic systolic heart failure secondary to NICM s/p 3 LVAD as BTT on 12/5/2018, listed as status 4  # s/p single chamber ICD (4/2017  Stage D. NYHA Class II. Dry wt ~220 lbs, 224 upon admission. Appears euvolemic to slightly hypervolemic on exam. MAP gaol 65-85.  - Cardiology HF consulted  - HOLD PTA bumex 2 mg BID,  Losartan 25 mg daily  - continue Metoprolol 37.5 mg daily,  Spironolatone 37.5 mg daily, ASA 81 mg daily    #h/o NSVT  In SVT upon arrival to 150s, sustained x15 minutes. Did not get anti-arrhythmics this AM at OSH.   - EKG  - IV metoprolol 5 mg with reduction in rates to <120  - restart PTA metoprolol and amiodarone    # Driveline Drainage   Noted in Cards non from 9/9/20 - was present then for about 1 mo.  Arrived without dressing covering driveline. Clear to yellow, small amounts. No erythema, warmth or tenderness, will empirically treat pending further workup  - Culture swab  - start doxycycline for empiric coverage  - Driveline doppler    GI/Nutrition:  - Cardiac diet    # Bowel regimen  Last BM 12/1.   - scheduled + PRN senna/docusate and miralax     Renal/Fluids/Electrolytes:  # CKD II  - Baseline ~1.1-1.2, GFR. 1.18 upon admission, GFR 65 - stable  - CTM    #Electrolytes  -K/Mg repletion protocols - K>4, Mg>2  - Phos regular repletion protocol    Endocrine:  #H/o diabetes  Not on any home meds,  upon arrival  - BG goal <180  - QID glucose  - HgbA1c    ID:  # COVID 19  # Concern for community acquired pneumonia  LDH typically ~200. Elevated inflammatory markers with , , fibrinogen 646, d-dimer 1.3, normal WBC. Afebrile  - Procal pending  - Dexamethasone and remdesivir as above  - Ceftriaxone + doxy x5 day empiric course (12/3-12/7)  - Discontinued azithro after 1 dose as starting on doxy    Hematology:    # Anticoagulation 2/2 LVAD   On warfarin, goal INR 2-3. Given patient's clinical stability and supratherapeutic INR to 4.25 upon arrival., opt to hold on heparin.   - pharmacy to dose warfarin    Musculoskeletal:  #Gout  - continue PTA allopurinol  - HOLD PTA colchicine    Skin:  #multiple sores  - skin care per nursing, appreciate excellent cares    General Cares/Prophylaxis:    DVT Prophylaxis: Pneumatic Compression Devices and Warfarin   GI Prophylaxis: PPI  Restraints: none  Family Communication: Updated patient's significant other, Yanna [phone], per his request,   Code Status: FULL code, confirmed with patient at bedside    Lines/tubes/drains:  - PIVx2    Disposition:  - Transfer to floor    Patient seen and findings/plan discussed with medical ICU staff, Dr. Kauffman.    Everardo Crystal MD  Internal Medicine, PGY-1  Pager: [phone] text  page    -----------------------------------------------------------------------    HISTORY PRESENTING ILLNESS:   Fabiano Chu is a 65-year-old male with history of nonischemic cardiomyopathy, stage D HFrEF, s/p ICD and LVAD placement as BTT therapy.  He was transferred from [facility] ED 12/3 to the ICU for acute hypoxic respiratory failure secondary to COVID-19.    Patient states that his  called him on 11/24 that she was positive for coronavirus.  The patient tested positive on 11/30.  He did have a fever earlier this week but has otherwise been asymptomatic.  He denies cough, shortness of breath, fever, chills/night sweats.  Last bowel movement was 12/1.  He decided to check his oxygenation with his home SPO2 probe yesterday which revealed oxygen saturations to the mid-80s.  He called his cardiology team here at the [facility] who recommended he seek emergency treatment at the [facility] ED.  He presented with sats high 80s, resolved with 3 L O2 via nasal cannula satting 92%.  Given his LVAD and suspected need for higher level of care, he was transferred here after [facility] declined due to his LVAD.    He lives in [facility] alone.  Does have a  otherwise manages independently.  Does not use cane or walker however does get mildly short of breath with activity.  Takes all of his medications as prescribed.    REVIEW OF SYSTEMS:   Denies: Fever, chills, body aches, rash, headache, sore throat, nasal congestion, cough, shortness of breath, chest pain, abdominal pain, nausea/vomiting, diarrhea, bleeding, joint pains, edema    Endorses: Mild dyspnea on exertion (chronic), constipation    PAST MEDICAL HISTORY:   Past Medical History:   Diagnosis Date     Acute systolic congestive heart failure (H) 4/5/2017     Diabetes (H)      HTN (hypertension)      Hyperlipidemia      Liver disease      LVAD (left ventricular assist device) present (H)      3 12/18     Marijuana abuse      Mitral  regurgitation      Nocturnal oxygen desaturation 3/29/2018     Nonischemic cardiomyopathy (H) 4/5/2017     SHIRLEY (obstructive sleep apnea)      Pacemaker 04/07/2017    ICD 4/7/17     Situational mixed anxiety and depressive disorder      SURGICAL HISTORY:  Past Surgical History:   Procedure Laterality Date     CV RIGHT HEART CATH N/A 5/9/2019    Procedure: CV RIGHT HEART CATH;  Surgeon: Jules Allan MD;  Location:  HEART CARDIAC CATH LAB     CV RIGHT HEART CATH N/A 7/2/2020    Procedure: CV RIGHT HEART CATH;  Surgeon: Jeremy Mckeon MD;  Location:  HEART CARDIAC CATH LAB     ESOPHAGOSCOPY, GASTROSCOPY, DUODENOSCOPY (EGD), COMBINED N/A 11/21/2018    Procedure: COMBINED ESOPHAGOSCOPY, GASTROSCOPY, DUODENOSCOPY (EGD);  Surgeon: Candido Mendez MD;  Location:  GI     IMPLANT IMPLANTABLE CARDIOVERTER DEFIBRILLATOR       INSERT VENTRICULAR ASSIST DEVICE LEFT (HEARTMATE II/III) MINIMALLY INVASIVE N/A 12/5/2018    Procedure: Partial Median Sternotomy, Left Thoracotomy, Minimally Invasive Left Ventricular Assist Device Placement (Heartmate III), On Cardiopulmonary Bypass;  Surgeon: Andrei Silva MD;  Location:  OR     SOCIAL HISTORY:  Social History     Socioeconomic History     Marital status: Single     Spouse name: Not on file     Number of children: Not on file     Years of education: Not on file     Highest education level: Not on file   Occupational History     Not on file   Social Needs     Financial resource strain: Not on file     Food insecurity     Worry: Not on file     Inability: Not on file     Transportation needs     Medical: Not on file     Non-medical: Not on file   Tobacco Use     Smoking status: Former Smoker     Packs/day: 0.30     Years: 20.00     Pack years: 6.00     Types: Cigarettes     Smokeless tobacco: Never Used     Tobacco comment: quit 3/2017   Substance and Sexual Activity     Alcohol use: No     Frequency: Never     Comment: rare     Drug use: No      Sexual activity: Not on file   Lifestyle     Physical activity     Days per week: Not on file     Minutes per session: Not on file     Stress: Not on file   Relationships     Social connections     Talks on phone: Not on file     Gets together: Not on file     Attends Sikh service: Not on file     Active member of club or organization: Not on file     Attends meetings of clubs or organizations: Not on file     Relationship status: Not on file     Intimate partner violence     Fear of current or ex partner: Not on file     Emotionally abused: Not on file     Physically abused: Not on file     Forced sexual activity: Not on file   Other Topics Concern     Parent/sibling w/ CABG, MI or angioplasty before 65F 55M? Not Asked   Social History Narrative     Not on file     FAMILY HISTORY:   Family History   Problem Relation Age of Onset     Heart Failure Father          at age 86     Heart Failure Paternal Uncle          at 66      Myocardial Infarction Paternal Uncle          at age 62     Coronary Artery Disease Mother          during CABG at age 41     ALLERGIES:   No Known Allergies  MEDICATIONS:  No current facility-administered medications on file prior to encounter.        allopurinol (ZYLOPRIM) 100 MG tablet, Take 2 tablets (200 mg) by mouth daily       amiodarone (PACERONE) 200 MG tablet, Take 1 tablet (200 mg) by mouth daily       amoxicillin (AMOXIL) 500 MG capsule, Take 4 capsules (2,000 mg) by mouth once as needed (Take 4 capsules (2000mg), one hour before any dental cleanings or procedures)       aspirin (ASA) 81 MG chewable tablet, Take 1 tablet (81 mg) by mouth daily       Blood Glucose Monitoring Suppl (BLOOD GLUCOSE MONITOR SYSTEM) w/Device KIT, three times a week       bumetanide (BUMEX) 1 MG tablet, Take 2 tablets (2 mg) by mouth 2 times daily       colchicine (COLCRYS) 0.6 MG tablet, One tab 0.6 mg on Mon/Wed/Fri x 1 month and if no gout flare up, stop it       enoxaparin (LOVENOX)  100 MG/ML syringe, Inject 100 mg (1 ml) every 12 hours as directed by the Anticoagulation Clinic. (Patient not taking: Reported on 7/16/2020)       losartan (COZAAR) 25 MG tablet, Take 1 tablet (25 mg) by mouth daily       metoprolol succinate ER (TOPROL XL) 25 MG 24 hr tablet, Take 1.5 tablets (37.5 mg) by mouth daily       potassium chloride ER (KLOR-CON) 10 MEQ CR tablet, Take 2 tablets (20 mEq) by mouth 2 times daily       spironolactone (ALDACTONE) 25 MG tablet, TAKE 1.5 TABLETS (37.5 MG) BY MOUTH DAILY       tacrolimus (PROTOPIC) 0.1 % external ointment, Apply topically 2 times daily (Patient not taking: Reported on 7/16/2020)       traZODone (DESYREL) 100 MG tablet, TAKE 1 TABLET BY MOUTH EVERY DAY AT BEDTIME AS NEEDED FOR SLEEP (Patient not taking: Reported on 7/16/2020)       vitamin B1 (THIAMINE) 100 MG tablet, Take 1 tablet (100 mg) by mouth daily (Patient not taking: Reported on 7/16/2020)       warfarin ANTICOAGULANT (COUMADIN) 2 MG tablet, TAKE 1 & 1/2 TABLET BY MOUTH EACH DAY AT 6PM        PHYSICAL EXAMINATION:  Temp:  [97.7  F (36.5  C)-99.1  F (37.3  C)] 99.1  F (37.3  C)  Pulse:  [134-152] 138  Resp:  [12-45] 12  BP: ()/() 92/61  SpO2:  [93 %-97 %] 93 %  General: Generally well appearing male who appears stated age, did develop mild/moderate respiratory distress during interview with tachypnea however this did improve after deep breathing and reassurance  HEENT: HOLLAND MURPHY  Neuro: A&Ox3, strength 4/5 upper and lower extremities bilaterally, intermittently tearful  Pulm/Resp: Satting 96/97% on oximask 6L, initially tachypneic which should resolve, no increased work of breathing or use of accessory muscles, clear breath sounds bilaterally without rhonchi, crackles -did initially have mild wheeze L>R which did resolve with deep breathing  CV: Tachycardic, irregularly irregular rhythm, mechanical hum from LVAD  Abdomen: Obese, soft, non-distended, non-tender, driveline in place  MSK: No  lower extremity edema, extremities cool to the touch, 2sec capillary refill  Incisions/Skin: Driveline site without bandage, surrounding hyperpigmentation, minimal/scant amount of serous yellow/tan drainage from incision site, no swelling, erythema, tenderness    LABS: Reviewed.   Arterial Blood Gases   Recent Labs   Lab 12/03/20  1100   PH 7.43   PCO2 26*   PO2 79*   HCO3 17*     Complete Blood Count   Recent Labs   Lab 12/03/20  1108   WBC 9.7   HGB 15.8        Basic Metabolic Panel  Recent Labs   Lab 12/03/20  1108      POTASSIUM 3.6   CHLORIDE 108   CO2 20   BUN 11   CR 1.18   *     Liver Function Tests  Recent Labs   Lab 12/03/20  1108   AST 39   ALT 27   ALKPHOS 124   BILITOTAL 1.2   ALBUMIN 2.6*   INR 4.25*     Coagulation Profile  Recent Labs   Lab 12/03/20  1108   INR 4.25*       IMAGING:  Recent Results (from the past 24 hour(s))   XR Chest Port 1 View    Narrative    EXAM: XR CHEST PORT 1 VW 12/3/2020 10:44 AM      HISTORY: COVID.    COMPARISON: Chest x-ray dated 12/17/2018, 12/14/2018.     TECHNIQUE: Frontal view of the chest.    FINDINGS: Trachea is midline. Postsurgical changes of LVAD placement  with mid median sternotomy wire fractured. Otherwise wires are intact.  ICD overlying the left chest wall. Low inspiratory volume.  Interstitial and airspace opacities in the right upper lung and left  lower lung. Right elevated hemidiaphragm. No pleural effusions. No  pneumothoraces. No acute osseous process.      Impression    IMPRESSION:   1. Interstitial and airspace opacities in the right upper lung and  left lower lung, which may represent pulmonary edema, infection, or  atelectasis.  2. Elevated right hemidiaphragm.  3. Stable support devices.    I have personally reviewed the examination and initial interpretation  and I agree with the findings.    COLLEEN SANDOVAL MD

## 2020-12-03 NOTE — PLAN OF CARE
Major Shift Events: Patient arrived via EMS this morning. Neuro intact, C/o back pain (chronic), PERRLA, up with assist 1+. On 6L oxy plus mask, LS diminished throughout. Tachypneic and dyspneic on exertion. Intermittent cough with dark tan / red-streaked sputum. Patient was in -150s upon arrival, but was stable. Gave 5mg IV metoprolol and patients PTA medications. Patient remained tachycardic 110-130s throughout the day until around 5pm and now HR is 80-90s. LVAD stable. Regular diet. Voids without difficulty. x2 PIVs. Started dexamethasone and remdesivir per MICU today.     Plan: Transfer to  this evening. Continue to monitor, notify MD of any acute changes.     For vital signs and complete assessments, please see documentation flowsheets.     Cabrera Ordonez RN

## 2020-12-03 NOTE — PROVIDER NOTIFICATION
Patient arrived via EMS, on 6L oxy plus mask, SpO2 96%. In -150s - MD aware, 12 lead EKG done.     No LVAD flow concerns at this time. Flows 5.1, PI 3.2, speed 5500, power 4.4.     Cabrera Ordonez RN

## 2020-12-03 NOTE — PROGRESS NOTES
"This is my portion of the assessment and plan. Please refer to critical care resident Dr. Crystal's H&P dated today for the rest of the history and plan.     65 year old male with a h/o NICMP, LVEF 15% s/p ICD placement 2017, LVAD placement 2018 (Heartmate 3), initially as destination but now on the list for heart transplantation.  He was transferred to [facility] MICU from [place] for acute hypoxemic respiratory failure due to COVID-19. He tested positive for SARS-CoV2 PCR test on 11/30 was overall feeling well.  He noted his home SpO2 to be in the 70's and presented to the ED.  He states he feels \"a little short of breath\" with movement and talking.      On examination, he is able to speak in full sentences and saturating 100% on 6L/min supplemental oxygen.  He has inspiratory crackles.  No LE edema noted.      # hypoxemic respiratory failure due to COVID-19  -  He was started on CTX and Azithromycin empirically for a 5d course.  He will be started on Dexamethasone and Remdesivir.      # serous discharge from driveline site  - Started on Doxy per cardiology recs. Imaging pending.     # h/o NICMP s/p LVAD as BTT  - He reports his dry weight as 225lb, and is 222lb on admission.   - Continue home dose BB, hold Bumex for now.     Plan to transfer to floors.   "

## 2020-12-03 NOTE — PROGRESS NOTES
Danielito Gonzales Vlad meets the eligibility requirement for enrollment in the [employer] clinical trial for COVID convalescent plasma.      Questions please contact Frieda Byrd, site PI    Frieda Byrd MD, PhD  [phone]

## 2020-12-03 NOTE — PROGRESS NOTES
Fabiano called this evening with increased shortness of breath. He said his home SAT monitor reads 85%. He had called the [facility] ED earlier today and they advised that he come in. I encouraged Fabiano to go to the ED at [facility]. I called the [facility] ED to let them know he was coming and he is COVID positive. I gave them the contact information of [facility]'s patient placement for MD consult or admission. [phone], option 2.

## 2020-12-03 NOTE — PROGRESS NOTES
12/03/20 1600   Quick Adds   Type of Visit Initial Occupational Therapy Evaluation   Living Environment   People in home alone   Current Living Arrangements house   Home Accessibility stairs to enter home   Number of Stairs, Main Entrance 2   Transportation Anticipated car, drives self   Self-Care   Usual Activity Tolerance good   Current Activity Tolerance poor   Regular Exercise No   Disability/Function   Hearing Difficulty or Deaf no   Wear Glasses or Blind yes   Vision Management glasses   Concentrating, Remembering or Making Decisions Difficulty no   Difficulty Communicating no   Difficulty Eating/Swallowing no   Walking or Climbing Stairs Difficulty no   Dressing/Bathing Difficulty no   Toileting issues no   Doing Errands Independently Difficulty (such as shopping) no   Fall history within last six months no   Change in Functional Status Since Onset of Current Illness/Injury yes   General Information   Referring Physician Everardo Crystal   Patient/Family Therapy Goal Statement (OT) return to PLOF   Additional Occupational Profile Info/Pertinent History of Current Problem  Danielito Chu is a 65 year old male with a history of NICM (LVEF at dong of 15%) s/p ICD (4/2017), who underwent HM3 LVAD implant on 12/2018 (initially as DT given some history of mariajuana smoking, liver disease but now BTT) admitted 12/3 as transfer from [facility] ED for ARHF 2/2 COVID pneumonia.   Existing Precautions/Restrictions fall   General Observations and Info pt with LVAD. Pt on 6L oximask.    Cognitive Status Examination   Orientation Status orientation to person, place and time   Affect/Mental Status (Cognitive) WFL   Memory Deficit moderate deficit   Cognitive Status Comments further testing required.    Visual Perception   Visual Impairment/Limitations WNL   Sensory   Sensory Quick Adds No deficits were identified   Range of Motion Comprehensive   General Range of Motion no range of motion deficits identified   Strength  Comprehensive (MMT)   General Manual Muscle Testing (MMT) Assessment other (see comments)   Comment, General Manual Muscle Testing (MMT) Assessment B UE grossly deconditioned.    Muscle Tone Assessment   Muscle Tone Quick Adds No deficits were identified   Coordination   Upper Extremity Coordination No deficits were identified   Bed Mobility   Bed Mobility rolling left;rolling right   Rolling Left [place] (Bed Mobility) supervision   Rolling Right [place] (Bed Mobility) supervision   Balance   Balance Comments per pt it is below baseline, refusing OOB today.    Activities of Daily Living   BADL Assessment/Intervention toileting   Toileting   [place] Level (Toileting) moderate assist (50% patient effort)   Assistive Devices (Toileting)   (bedpan. )   Instrumental Activities of Daily Living (IADL)   Previous Responsibilities meal prep;housekeeping;laundry;shopping;yardwork;medication management;finances;driving   Clinical Impression   Criteria for Skilled Therapeutic Interventions Met (OT) yes   OT Diagnosis decreased ADL I   Assessment of Occupational Performance 5 or more Performance Deficits   Identified Performance Deficits dressing, bathing, toielting, G/H, home making, leisure.    Planned Therapy Interventions (OT) ADL retraining;IADL retraining;bed mobility training;cognition;strengthening;transfer training;home program guidelines;progressive activity/exercise;risk factor education   Clinical Decision Making Complexity (OT) low complexity   Therapy Frequency (OT) 5x/week   Predicted Duration of Therapy 2 weeks   Risks and Benefits of Treatment have been explained. Yes   Patient, Family & other staff in agreement with plan of care Yes   Comment-Clinical Impression Pt presents to OT with decreased activity tolerance as well as deconditioning and cognitive deficits leading to decreased ADL I.    OT Discharge Planning    OT Discharge Recommendation (DC Rec) Transitional Care Facility   OT Rationale  for DC Rec pt below baseline in ADL I   OT Brief overview of current status  Pt with memory deficits and requiring mod assist for toileting using bed pan today 2/2 fatigue too great for OOB.    Total Evaluation Time (Minutes)   Total Evaluation Time (Minutes) 5

## 2020-12-03 NOTE — PROGRESS NOTES
[facility]     Hospitalist History and Physical       Date of Admission:  12/3/2020  Assessment & Plan   Danielito Chu is a 65 year old male with HM3 LVAD (12/2018) bridge to heart transplant, NICM, (LVEF at dong of 15%) s/p ICD (4/2017), DM2, CKD II, gout, GERD who was admitted to [facility]  on 12/3/2020 for evaluation of  fever, dyspnea and weakness and loss of appetite and transfer from [place] ED for ARHF 2/2 COVID pneumonia.    COVID-19 Diagnosis Details:  Onset of COVID symptoms ~11/28   Date of positive test results 11/30/20 and on 12/3/20   Research study Approved for [facility] clinical trial for COVID convalescent plasma (see 12/3 note Dr. Byrd)     # Confirmed COVID-19 infection    # Acute Hypoxic Respiratory Failure secondary to COVID-19 infection  - Initial chest x-ray 12/3 showed positive findings of worsening bilateral patchy opacities. Oxygen needs have been somewhat labile and was needing up to 8L, currently . Vitals concerning for MAPs in the 70 and HR in the 130-160s, dong HR in the 110s, got metop and amio in mICU   - Admit to medical floor with continuous pulse oximetry, COVID-19 special precautions, minimized lab draws, and care consolidation. Labs significant for , , WBC 9.7.  - Oxygen: continue current support with Venturi face mask at 6 L/min; titrate to keep SpO2 between 90-96%  - Labs: CBC with differential, CMP, BNP or NT-proBNP, reticulocyte count, PT/INR, aPTT, fibrinogen, antithrombin, D-dimer, CPK, ferritin and LDH done on admission and reviewed by me. Will trend daily until patient reaches clinical stability.  - Imaging: CT chest (is to assess LVAD in this case)  - Breathing treatments: no inhalers needed; avoid nebulizers in favor of MDIs   - IV fluids: not indicated at this time  - Antibiotics: - Ceftriaxone + doxy x5 day empiric course (12/3-12/7)  - Discontinued  "azithro after 1 dose as starting on doxy, procal 0.19  - Treatments: Dexamethasone 6 mg x 10 days or hospital discharge, started on 12/3 and Remdesivir x 5 days or hospital discharge, started on 12/3  - Research study protocol: pending review  - DVT Prophylaxis: At high risk of thrombotic complications due to COVID-19 disease (last DDimer 1.3).          - Already on therapeutic anticoagulation with warfarin  - Discharge Anticoagulation: At discharge consider one of the following for 30 days and until the patient has returned to normal mobility:        - Apixaban (Eliquis) 2.5 mg two times a day        - Rivaroxaban (Xarelto) 10 mg once daily    D Dimer   Date Value Ref Range Status   12/03/2020 1.3 (H) 0.0 - 0.50 ug/ml FEU Final     Comment:     This D-dimer assay is intended for use in conjunction with a clinical pretest   probability assessment model to exclude pulmonary embolism (PE) and deep   venous thrombosis (DVT) in outpatients suspected of PE or DVT. The cut-off   value is 0.5 ug/mL FEU.         ACTIVE HOSPITAL PROBLEMS:    LVAD (Heartmate 3) on Coumadin anticoagulation  Hx NICMP (LVEF 15%)  Drainage around LVAD drive line  Chronic systolic heart failure secondary to NICM s/p 3 LVAD as BTT on 12/5/2018, listed as status 4  s/p single chamber ICD (4/2017)  US 12/3/20 shows \"some heterogeneous material superficially just below the skin surface along the driveline catheter entrance measuring approximately 0.9 x 2.2 x 0.7 cm (craniocaudad by transverse by AP). There is also a heterogeneous hypoechoic/anechoic area along the drive line just  inferior to this measuring approximately 3.8 x 2.3 x 1.2 cm (craniocaudad by transverse by AP\". Stage D. NYHA Class II. Dry wt ~220 lbs, 224 upon admission. Appears euvolemic to slightly hypervolemic on exam. MAP gaol 65-85.  - Cardiology HF input is appreciated  - rate contol: amiodarone 200mg daily, metoprolol succ 37.5mg daily (got metop 5mg IV x 1 in mICU)  -  Currently " holding PTA bumex 2 mg BID,  Losartan 25 mg daily, follow up volume status and with cards in a.m. , may be able to resume  - continue spironolactone 37.5mg daily  - INR goal 2-3 (4.25 on admission), holding coumadin today, consider to resume per pharmacy recs tomorrow  - could consider device interrogation, does not appear to need urgently at this time   - COVID tx as per above  - doxy per cards as per above 12/3-12/8  - follow up culture and sensitivity drain fluid 12/3/20  - Driveline doppler results above, c/w fluid collections at driveline site  - FEN: cardiac diet, trend I&O and weights  - please continue to document LVAD settings   - consult CT surgery to eval driveline site  - ID consult Re driveline infection Abx recs  - CT abdomen to assess driveline site infection    h/o NSVT  In SVT upon arrival to 150s, sustained x15 minutes. Did not get anti-arrhythmics this AM at OSH. Per discussion with cards and given LVAD status, would assess patient per LVAD settings and clinical picture and not purely with vitals. MAPs remain in the 70s   - continue telemetry  - IV metoprolol 5 mg x 1 given in mICU with reduction in rates to <120  - continue PTA metoprolol secc 37.5mg and amiodarone 200mg daily    Chronic, stable Medical Problems:    Anxiety, acute stress  Patient notes working with a therapist outpatient with respect to his LVAD/cardiac history and notes that primarily he is feeling quite anxious and is agreeable to meeting with psychiatry to discuss  - approach treatments with caution and would d/w cardiolgy  - psychiatry consult in a.m.   - offer emotional support       CKD II  Baseline ~1.1-1.2, GFR. 1.18 upon admission, GFR 65 - stable  - CTM  - K/Mg repletion protocols - K>4, Mg>2  - Phos regular repletion protocol  - avoid nephrotoxins where possible     GERD  -continue PTA protonix 20mg daily    DM  No PTA meds. A1C 5.9 (12/3)  in the setting of acute illness (COVID) and steroid mng.  Glucose in the  110-120s.  - trend glucose, currently controlled  - consider start sliding scale insulin in glucose uptrending    Chronic mid and upper back pain  Has followed with an MD in [place] and reports being prescribed a steroid and narcotic for his pain (oxycodone?) and has a 4/10 pain in the mid and upper back  - topical bengay to back  -lidoderm patch  - discussed with patient that given his pulmonary status, would try to avoid narcotics and is already on dexamethasone    Right Otalgia  No otoscope available for examination. External tragus/auricle/cannal/mastoid is nontender including with pulling the auricle and no visible lesions/erythema/discharge  - otoscopic exam when available  - currently on oral Abx as per above, consider ciprodex if appearance of bacterial infection     Hx Gout  No sign of active flare   - continue PTA allopurinol  - HOLD PTA colchicine    multiple sores  - skin care per nursing, appreciate excellent cares    History of congenital urethral stricture  RUG/ voiding cystourethrogram 8/2019 normal  - bladder scan prn suprapubic pain or no void >6hr while awake     Code Status: Full Code    Goals of Care/ACP:  Danielito expressed these wishes if he is unable to communicate  Health care agent self   Short term intubation yes   Prolonged intubation no   Tracheostomy not asked   Nasal feeding tube not asked   Gastric feeding tube not asked   Other wishes None      Diet: Low Saturated Fat Na <2400 mg    Valentine Catheter: not present  Bedside iPad: NOT used due to severity of illness/medical appropriateness   Disposition Plan   Expected discharge: 4 - 7 days, recommended to transitional care unit once oxygen needs stabilized and home oxygen arranged, safe disposition plan/TCU bed available and completed inpatient assessments and procedures for LVAD driveline infection.  Entered: Candy Ventura PA-C 12/03/2020, 3:58 PM       The patient's care was discussed with the Attending Physician, Dr. Chaves, Bedside  "Nurse and Patient.    Candy Ventura PA-C  [facility]   Contact information available via [facility] Paging/Directory  Please see sign in/sign out for up to date coverage information  ______________________________________________________________________    Chief Complaint   Shortness of breath and weakness, COVID-19 positive    History is obtained from the patient    History of Present Illness   Danielito Chu is a 65 year old male with HM3 LVAD (12/2018) bridge to heart transplant, NICM, (LVEF at dong of 15%) s/p ICD (4/2017), DM2, CKD II, gout, GERD who was admitted to [facility]  on 12/3/2020 for evaluation of  fever, dyspnea and weakness and loss of appetite and transfer from [place] ED for ARHF 2/2 COVID pneumonia.    Today he is still feeling anxious and notes that this contributes to his shortness of breath.  He has midback pain and pain under his right arm without focal deficits and has seen an MD in [place] and was given \"a steroid and oxyContin\" which he is no longer taking.  He continues to feel weak and has been OOB walking with RN assist and is working with PT.    He is having right ear pain that is not severe and started in the past few days.     Denies chest pain, current SOB, palpitations, current fever, rashes, skin changes, sore throat, bleeding, bruising, urinary complaints, abdominal pain, changes to bowels, diarrhea or other complaints.     Review of Systems    The 10 point Review of Systems is negative other than noted in the HPI or here.     Past Medical History    I have reviewed this patient's medical history and updated it with pertinent information if needed.   Past Medical History:   Diagnosis Date     Acute systolic congestive heart failure (H) 4/5/2017     Diabetes (H)      HTN (hypertension)      Hyperlipidemia      Liver disease      LVAD (left ventricular assist device) present (H)     HM " 3 12/18     Marijuana abuse      Mitral regurgitation      Nocturnal oxygen desaturation 3/29/2018     Nonischemic cardiomyopathy (H) 4/5/2017     SHIRLEY (obstructive sleep apnea)      Pacemaker 04/07/2017    ICD 4/7/17     Situational mixed anxiety and depressive disorder        Past Surgical History   I have reviewed this patient's surgical history and updated it with pertinent information if needed.  Past Surgical History:   Procedure Laterality Date     CV RIGHT HEART CATH N/A 5/9/2019    Procedure: CV RIGHT HEART CATH;  Surgeon: Jules Allan MD;  Location:  HEART CARDIAC CATH LAB     CV RIGHT HEART CATH N/A 7/2/2020    Procedure: CV RIGHT HEART CATH;  Surgeon: Jeremy Mckeon MD;  Location:  HEART CARDIAC CATH LAB     ESOPHAGOSCOPY, GASTROSCOPY, DUODENOSCOPY (EGD), COMBINED N/A 11/21/2018    Procedure: COMBINED ESOPHAGOSCOPY, GASTROSCOPY, DUODENOSCOPY (EGD);  Surgeon: Candido Mendez MD;  Location:  GI     IMPLANT IMPLANTABLE CARDIOVERTER DEFIBRILLATOR       INSERT VENTRICULAR ASSIST DEVICE LEFT (HEARTMATE II/III) MINIMALLY INVASIVE N/A 12/5/2018    Procedure: Partial Median Sternotomy, Left Thoracotomy, Minimally Invasive Left Ventricular Assist Device Placement (Heartmate III), On Cardiopulmonary Bypass;  Surgeon: Andrei Silva MD;  Location:  OR       Social History   I have reviewed this patient's social history and updated it with pertinent information if needed.  Social History     Tobacco Use     Smoking status: Former Smoker     Packs/day: 0.30     Years: 20.00     Pack years: 6.00     Types: Cigarettes     Smokeless tobacco: Never Used     Tobacco comment: quit 3/2017   Substance Use Topics     Alcohol use: No     Frequency: Never     Comment: rare     Drug use: No       Family History   I have reviewed this patient's family history and updated it with pertinent information if needed.   Family History   Problem Relation Age of Onset     Heart Failure Father           at age 86     Heart Failure Paternal Uncle          at 66      Myocardial Infarction Paternal Uncle          at age 62     Coronary Artery Disease Mother          during CABG at age 41     Prior to Admission Medications   Prior to Admission Medications   Prescriptions Last Dose Informant Patient Reported? Taking?   Blood Glucose Monitoring Suppl (BLOOD GLUCOSE MONITOR SYSTEM) w/Device KIT   Yes No   Sig: three times a week   allopurinol (ZYLOPRIM) 100 MG tablet   No No   Sig: Take 2 tablets (200 mg) by mouth daily   amiodarone (PACERONE) 200 MG tablet   No No   Sig: Take 1 tablet (200 mg) by mouth daily   amoxicillin (AMOXIL) 500 MG capsule   No No   Sig: Take 4 capsules (2,000 mg) by mouth once as needed (Take 4 capsules (2000mg), one hour before any dental cleanings or procedures)   aspirin (ASA) 81 MG chewable tablet  Self No No   Sig: Take 1 tablet (81 mg) by mouth daily   bumetanide (BUMEX) 1 MG tablet   Yes No   Sig: Take 2 tablets (2 mg) by mouth 2 times daily   colchicine (COLCRYS) 0.6 MG tablet   No No   Sig: One tab 0.6 mg on Mon/Wed/Fri x 1 month and if no gout flare up, stop it   enoxaparin (LOVENOX) 100 MG/ML syringe  Self No No   Sig: Inject 100 mg (1 ml) every 12 hours as directed by the Anticoagulation Clinic.   Patient not taking: Reported on 2020   losartan (COZAAR) 25 MG tablet   No No   Sig: Take 1 tablet (25 mg) by mouth daily   metoprolol succinate ER (TOPROL XL) 25 MG 24 hr tablet   No No   Sig: Take 1.5 tablets (37.5 mg) by mouth daily   potassium chloride ER (KLOR-CON) 10 MEQ CR tablet   No No   Sig: Take 2 tablets (20 mEq) by mouth 2 times daily   spironolactone (ALDACTONE) 25 MG tablet   No No   Sig: TAKE 1.5 TABLETS (37.5 MG) BY MOUTH DAILY   tacrolimus (PROTOPIC) 0.1 % external ointment   No No   Sig: Apply topically 2 times daily   Patient not taking: Reported on 2020   traZODone (DESYREL) 100 MG tablet  Self No No   Sig: TAKE 1 TABLET BY MOUTH EVERY DAY AT  BEDTIME AS NEEDED FOR SLEEP   Patient not taking: Reported on 7/16/2020   vitamin B1 (THIAMINE) 100 MG tablet  Self No No   Sig: Take 1 tablet (100 mg) by mouth daily   Patient not taking: Reported on 7/16/2020   warfarin ANTICOAGULANT (COUMADIN) 2 MG tablet   No No   Sig: TAKE 1 & 1/2 TABLET BY MOUTH EACH DAY AT 6PM      Facility-Administered Medications: None     Allergies   No Known Allergies    Physical Exam   Vital Signs: Temp: 99.1  F (37.3  C) Temp src: Axillary BP: 92/61 Pulse: 138   Resp: 12 SpO2: 93 % O2 Device: Oxi Plus Oxygen Delivery: 6 LPM  Weight: 224 lbs 3.33 oz  GENERAL: Alert and oriented x 3. NAD. Able to sit up with mild assist. Cooperative.   HEENT: Anicteric sclera. Mucous membranes moist. NC. AT. EOMI. Pupils equal and round  CV: LVAD hum   RESPIRATORY: Effort normal on 6L oxymask. Lungs good air movement, with no wheezing, rales, rhonchi.   GI: LVAD driveline site is CDI with gauze/tape. Abdomen soft and non distended with normoactive bowel sounds present in all quadrants. No tenderness, rebound, guarding. No lesions.   NEUROLOGICAL: No focal deficits. Moves all extremities.  CN 2-12 grossly intact.  EXTREMITIES: No peripheral edema. + cap refill  SKIN: No jaundice. No rashes.  BACK: no CVA tenderness, no spinous tenderness      Data   Data reviewed today: I reviewed all medications, new labs and imaging results over the last 24 hours. I personally reviewed no images or EKG's today.    Recent Labs   Lab 12/03/20  1406 12/03/20  1108   WBC  --  9.7   HGB  --  15.8   MCV  --  97   PLT  --  251   INR  --  4.25*   NA  --  137   POTASSIUM 4.0 3.6   CHLORIDE  --  108   CO2  --  20   BUN  --  11   CR  --  1.18   ANIONGAP  --  8   ASHLEE  --  8.2*   GLC  --  110*   ALBUMIN  --  2.6*   PROTTOTAL  --  6.6*   BILITOTAL  --  1.2   ALKPHOS  --  124   ALT  --  27   AST  --  39

## 2020-12-03 NOTE — LETTER
December 4, 2020    Danielito Chu  [address] [place] [address]      Dear Mr. Chu,      This letter is sent to notify you that your listing status was changed from Status 4 to Status 7 on the heart transplant waitlist at the [facility].   Your status was changed in response to the ongoing COVID-19 (coronavirus) pandemic. We're taking every step to protect our patients during the COVID-19 (coronavirus) outbreak. Solid organ transplant patients have higher risk of getting sick from COVID-19.  Our transplant team reviewed your medical history, and the team has decided to change your wait-list status from  active  to  temporarily inactive .  Your name will remain on the waiting list, but you will not be receiving organ offers while you are  temporarily inactive .   As we continue to monitor the impact of COVID-19, your waitlist status will be under ongoing review. If you have questions between now and then, please do not hesitate to call us.   We also encourage you to follow these self-care practices:     The best thing you can do is stay home as much as you can.    Try to go for walks outside in places that aren t crowded.    Avoid schools,  centers, stores and Taoism places.    Keep a safe distance (6 to 8 feet) away from others when out in public.    Avoid people who are sick.    Avoid all travel.      Details about COVID-19 change quickly. Here is a list of sources to keep you informed:   - American Society of Transplantation (AST) COVID-19 patient details: https://www.myast.org/coronavirus-disease-2019-covid-19-frequently-odunl-fnjoosqeo-txpoeprdbi-candidates-and-recipients  - CDC Coronavirus Disease 2019 page:   https://www.cdc.gov/coronavirus/2019-ncov/index.html  - [facility] Coronavirus 2019 page: https://www.health.state.[place]/diseases/coronavirus/index.html  - [facility] Transplant Services:  https://www.[facility].org/care/overarching-care/transplant-services-adult  - GORAN Asher COVID 19 Page: https://www.meebeeth.org/care/conditions/covid-19     - Review the Department of [facility] website for your home state.     Call your Transplant Coordinator with questions at [phone]. If our guidance about COVID-19 changes, we will contact you again.    Thank you for trusting us with your care,   GORAN Asher  Solid Organ Transplant Services  Cc Referring Provider, Primary Care Provider  Enclosure:  OS enclosure                              The Organ Procurement and Transplantation Network  Toll-free patient services line:     Your resource for organ transplant information    If you have a question regarding your own medical care, you always should call your transplant hospital first. However, for general organ transplant-related information, you can call the Organ Procurement and Transplantation Network (OPTN) toll-free patient services line at [phone]- [phone]. Anyone, including potential transplant candidates, candidates, recipients, family members, friends, living donors, and donor family members, can call this number to:          Talk about organ donation, living donation, the transplant process, the donation process, and transplant policies.    Get a free patient information kit with helpful booklets, waiting list and transplant information, and a list of all transplant hospitals.    Ask questions about the OPTN website (https://optn.transplant.hrsa.gov/), the United Network for Organ Sharing s (UNOS) website (https://unos.org/), or the UNOS website for living donors and transplant recipients. (https://www.transplantliving.org/).    Learn how the OPTN can help you.    Talk about any concerns that you may have with a transplant hospital.    The nation s transplant system, the OPTN, is managed under federal contract by the United Network for Organ Sharing (UNOS), which is a non-profit charitable  organization. The OPTN helps create and define organ sharing policies that make the best use of donated organs. This process continuously evaluating new advances and discoveries so policies can be adapted to best serve patients waiting for transplants. To do so, the OPTN works closely with transplant professionals, transplant patients, transplant candidates, donor families, living donors, and the public. All transplant programs and organ procurement organizations throughout the country are OPTN members and are obligated to follow the policies the OPTN creates for allocating organs.    The OPTN also is responsible for:      Providing educational material for patients, the public, and professionals.    Raising awareness of the need for donated organs and tissue.    Coordinating organ procurement, matching, and placement.    Collecting information about every organ transplant and donation that occurs in the [place].    Remember, you should contact your transplant hospital directly if you have questions or concerns about your own medical care including medical records, work-up progress, and test results.    We are not your transplant hospital, and our staff will not be able to answer questions about your case, so please keep your transplant hospital s phone number handy.    However, while you research your transplant needs and learn as much as you can about transplantation and donation, we welcome your call to our toll-free patient services line at [phone].          Updated 4/1/2019

## 2020-12-03 NOTE — CONSULTS
"    [facility]     Cardiology Consult Note-Cards 2      Date of Admission:  12/3/2020  Consult Requested by: MICU Team  Reason for Consult: \"65M with LVAD as BTT therapy here with COVID\"    Assessment & Plan: HVSL   Danielito Chu is a 65 year old male admitted on 12/3/2020 with COVID pneumonia. He has a history of NICM (LVEF at dong of 15%) s/p ICD (4/2017), who underwent HM3 LVAD implant on 12/5/18 as DT, now BTT. He has a history of atrial arrythmia, s/p ICD, on amiodarone and metoprolol. He is admitted as transfer to MICU from [place] ED for ARHF 2/2 COVID pneumonia. Additionally, he has signs of driveline infection including worrisome discharge and subcutaneous fluid on ultrasound.    Recommendations:  - ultrasound investigation of drive line to evaluate for abscess or other pocket of infection  - wound culture of driveline   - start doxycycline  - ok to hold diuretics at present, recommend resuming at half home dosage either this evening or tomorrow  - hold warfarin given supratherapeutic INR  - no changes to LVAD settings needed at present  - agree with Covid-19 cares per primary team    Updated Recommendations after ultrasound results:  - recommend CT surgery consultation for evaluation of driveline infection  - recommend ID consultation for antibiotic recommendations  - recommend CT abdomen for better elucidation of driveline infection     The patient's care was discussed with the Attending Physician, Dr. Mondragon, Bedside Nurse and Primary team.    Chico Ventura MD   Cardiology Fellow  [facility]   Pager: [phone]  [facility]: [phone]      ______________________________________________________________________    Chief Complaint   Shortness of Breath      History of Present Illness   Danielito Chu is a 65 year old male who has a history of NICM (LVEF at dong of 15%) s/p ICD (4/2017), who underwent HM3 LVAD implant " on 12/5/18 as DT, now BTT. He has a history of atrial arrythmia, s/p ICD, on amiodarone and metoprolol. He is admitted as transfer to MICU from [place] ED for ARHF 2/2 COVID pneumonia. Additionally, he has signs of driveline infection including worrisome discharge and subcutaneous fluid on ultrasound.    He was diagnosed with Covid on 11/30. On the morning of 12/3 he developed increased shortness of breath and hypoxia. He went tot the [place] ED for evaluation where he had a 3-6L O2 requirement, and was subsequently transferred to the [facility] MICU for a higher level of care.  On arrival to the [facility] MICU he is breathing comfortably on oxymask at 6L. He is started on dexamethasone and remdesivir.    In September he noted drainage from the driveline site and was treated with a course of doxycycline. The drainage had improved and the skin was reportedly clear. Ojn admission today the driveline is unbandaged and there is again drainage noted. Cultures have been sent and an ultrasound will be obtained.    Past Medical History    I have reviewed this patient's medical history and updated it with pertinent information if needed.   Past Medical History:   Diagnosis Date     Acute systolic congestive heart failure (H) 4/5/2017     Diabetes (H)      HTN (hypertension)      Hyperlipidemia      Liver disease      LVAD (left ventricular assist device) present (H)     HM 3 12/18     Marijuana abuse      Mitral regurgitation      Nocturnal oxygen desaturation 3/29/2018     Nonischemic cardiomyopathy (H) 4/5/2017     SHIRLEY (obstructive sleep apnea)      Pacemaker 04/07/2017    ICD 4/7/17     Situational mixed anxiety and depressive disorder        Past Surgical History   I have reviewed this patient's surgical history and updated it with pertinent information if needed.  Past Surgical History:   Procedure Laterality Date     CV RIGHT HEART CATH N/A 5/9/2019    Procedure: CV RIGHT HEART CATH;  Surgeon: Jules Allan MD;  Location:   HEART CARDIAC CATH LAB     CV RIGHT HEART CATH N/A 2020    Procedure: CV RIGHT HEART CATH;  Surgeon: Jeremy Mckeon MD;  Location:  HEART CARDIAC CATH LAB     ESOPHAGOSCOPY, GASTROSCOPY, DUODENOSCOPY (EGD), COMBINED N/A 2018    Procedure: COMBINED ESOPHAGOSCOPY, GASTROSCOPY, DUODENOSCOPY (EGD);  Surgeon: Candido Mendez MD;  Location:  GI     IMPLANT IMPLANTABLE CARDIOVERTER DEFIBRILLATOR       INSERT VENTRICULAR ASSIST DEVICE LEFT (HEARTMATE II/III) MINIMALLY INVASIVE N/A 2018    Procedure: Partial Median Sternotomy, Left Thoracotomy, Minimally Invasive Left Ventricular Assist Device Placement (Heartmate III), On Cardiopulmonary Bypass;  Surgeon: Andrei Silva MD;  Location:  OR       Social History   I have reviewed this patient's social history and updated it with pertinent information if needed.  Social History     Tobacco Use     Smoking status: Former Smoker     Packs/day: 0.30     Years: 20.00     Pack years: 6.00     Types: Cigarettes     Smokeless tobacco: Never Used     Tobacco comment: quit 3/2017   Substance Use Topics     Alcohol use: No     Frequency: Never     Comment: rare     Drug use: No     Family History   I have reviewed this patient's family history and updated it with pertinent information if needed.   I have reviewed this patient's family history and updated it with pertinent information if needed.  Family History   Problem Relation Age of Onset     Heart Failure Father          at age 86     Heart Failure Paternal Uncle          at 66      Myocardial Infarction Paternal Uncle          at age 62     Coronary Artery Disease Mother          during CABG at age 41       Medications   Medications Prior to Admission   Medication Sig Dispense Refill Last Dose     allopurinol (ZYLOPRIM) 100 MG tablet Take 2 tablets (200 mg) by mouth daily 180 tablet 1      amiodarone (PACERONE) 200 MG tablet Take 1 tablet (200 mg) by mouth daily 90 tablet  3      amoxicillin (AMOXIL) 500 MG capsule Take 4 capsules (2,000 mg) by mouth once as needed (Take 4 capsules (2000mg), one hour before any dental cleanings or procedures) 4 capsule 0      aspirin (ASA) 81 MG chewable tablet Take 1 tablet (81 mg) by mouth daily 120 tablet 0      Blood Glucose Monitoring Suppl (BLOOD GLUCOSE MONITOR SYSTEM) w/Device KIT three times a week        bumetanide (BUMEX) 1 MG tablet Take 2 tablets (2 mg) by mouth 2 times daily 180 tablet 3      colchicine (COLCRYS) 0.6 MG tablet One tab 0.6 mg on Mon/Wed/Fri x 1 month and if no gout flare up, stop it 15 tablet 1      enoxaparin (LOVENOX) 100 MG/ML syringe Inject 100 mg (1 ml) every 12 hours as directed by the Anticoagulation Clinic. (Patient not taking: Reported on 7/16/2020) 10 Syringe 1      losartan (COZAAR) 25 MG tablet Take 1 tablet (25 mg) by mouth daily 90 tablet 3      metoprolol succinate ER (TOPROL XL) 25 MG 24 hr tablet Take 1.5 tablets (37.5 mg) by mouth daily 90 tablet 3      potassium chloride ER (KLOR-CON) 10 MEQ CR tablet Take 2 tablets (20 mEq) by mouth 2 times daily 360 tablet 3      spironolactone (ALDACTONE) 25 MG tablet TAKE 1.5 TABLETS (37.5 MG) BY MOUTH DAILY 135 tablet 3      tacrolimus (PROTOPIC) 0.1 % external ointment Apply topically 2 times daily (Patient not taking: Reported on 7/16/2020) 30 g 3      traZODone (DESYREL) 100 MG tablet TAKE 1 TABLET BY MOUTH EVERY DAY AT BEDTIME AS NEEDED FOR SLEEP (Patient not taking: Reported on 7/16/2020) 30 tablet 1      vitamin B1 (THIAMINE) 100 MG tablet Take 1 tablet (100 mg) by mouth daily (Patient not taking: Reported on 7/16/2020) 30 tablet 0      warfarin ANTICOAGULANT (COUMADIN) 2 MG tablet TAKE 1 & 1/2 TABLET BY MOUTH EACH DAY AT 6PM 135 tablet 3        Allergies   No Known Allergies    Physical Exam   Vital Signs: Temp: 97.7  F (36.5  C) Temp src: Oral BP: (!) 78/62 Pulse: 137   Resp: 16 SpO2: 95 % O2 Device: Oxi Plus Oxygen Delivery: 6 LPM  Weight: 224 lbs 3.33  oz    General Appearance:   Eyes:   HEENT:   Respiratory:   Cardiovascular:   GI:   Lymph/Hematologic:   Genitourinary:   Skin:   Musculoskeletal:   Neurologic:   Psychiatric:     Data   Results for orders placed or performed during the hospital encounter of 12/03/20 (from the past 24 hour(s))   XR Chest Port 1 View    Narrative    EXAM: XR CHEST PORT 1 VW 12/3/2020 10:44 AM      HISTORY: COVID.    COMPARISON: Chest x-ray dated 12/17/2018, 12/14/2018.     TECHNIQUE: Frontal view of the chest.    FINDINGS: Trachea is midline. Postsurgical changes of LVAD placement  with mid median sternotomy wire fractured. Otherwise wires are intact.  ICD overlying the left chest wall. Low inspiratory volume.  Interstitial and airspace opacities in the right upper lung and left  lower lung. Right elevated hemidiaphragm. No pleural effusions. No  pneumothoraces. No acute osseous process.      Impression    IMPRESSION:   1. Interstitial and airspace opacities in the right upper lung and  left lower lung, which may represent pulmonary edema, infection, or  atelectasis.  2. Elevated right hemidiaphragm.  3. Stable support devices.    I have personally reviewed the examination and initial interpretation  and I agree with the findings.    COLLEEN SANDOVAL MD   Symptomatic COVID-19 Virus (Coronavirus) by PCR    Specimen: Nasopharyngeal   Result Value Ref Range    COVID-19 Virus PCR to [facility] - Source Swab     COVID-19 Virus PCR to [facility] - Result       Test received-See reflex to IDDL test SARS CoV2 (COVID-19) Virus RT-PCR   Blood gas arterial with oxyhemoglobin   Result Value Ref Range    pH Arterial 7.43 7.35 - 7.45 pH    pCO2 Arterial 26 (L) 35 - 45 mm Hg    pO2 Arterial 79 (L) 80 - 105 mm Hg    Bicarbonate Arterial 17 (L) 21 - 28 mmol/L    FIO2 52     Oxyhemoglobin Arterial 95 92 - 100 %    Base Deficit Art 5.1 mmol/L   SARS-CoV-2 COVID-19 Virus (Coronavirus) RT-PCR Nasopharyngeal    Specimen: Nasopharyngeal   Result Value Ref Range     SARS-CoV-2 Virus Specimen Source Swab     SARS-CoV-2 PCR Result POSITIVE (AA)     SARS-CoV-2 PCR Comment       Testing was performed using the [employer] COVID-19 Direct Assay on the [employer] MDX   instrument. Additional information about this Emergency Use Authorization (EUA) assay can   be found via the Lab Guide.     D dimer quantitative   Result Value Ref Range    D Dimer 1.3 (H) 0.0 - 0.50 ug/ml FEU   CRP inflammation   Result Value Ref Range    CRP Inflammation 130.0 (H) 0.0 - 8.0 mg/L   Procalcitonin   Result Value Ref Range    Procalcitonin 0.19 ng/ml   Fibrinogen activity   Result Value Ref Range    Fibrinogen 646 (H) 200 - 420 mg/dL   INR   Result Value Ref Range    INR 4.25 (H) 0.86 - 1.14   Calcium ionized whole blood   Result Value Ref Range    Calcium Ionized Whole Blood 4.2 (L) 4.4 - 5.2 mg/dL   Phosphorus   Result Value Ref Range    Phosphorus 2.2 (L) 2.5 - 4.5 mg/dL   Magnesium   Result Value Ref Range    Magnesium 1.8 1.6 - 2.3 mg/dL   Comprehensive metabolic panel   Result Value Ref Range    Sodium 137 133 - 144 mmol/L    Potassium 3.6 3.4 - 5.3 mmol/L    Chloride 108 94 - 109 mmol/L    Carbon Dioxide 20 20 - 32 mmol/L    Anion Gap 8 3 - 14 mmol/L    Glucose 110 (H) 70 - 99 mg/dL    Urea Nitrogen 11 7 - 30 mg/dL    Creatinine 1.18 0.66 - 1.25 mg/dL    GFR Estimate 65 >60 mL/min/[1.73_m2]    GFR Estimate If Black 75 >60 mL/min/[1.73_m2]    Calcium 8.2 (L) 8.5 - 10.1 mg/dL    Bilirubin Total 1.2 0.2 - 1.3 mg/dL    Albumin 2.6 (L) 3.4 - 5.0 g/dL    Protein Total 6.6 (L) 6.8 - 8.8 g/dL    Alkaline Phosphatase 124 40 - 150 U/L    ALT 27 0 - 70 U/L    AST 39 0 - 45 U/L   CBC with platelets differential   Result Value Ref Range    WBC 9.7 4.0 - 11.0 10e9/L    RBC Count 4.82 4.4 - 5.9 10e12/L    Hemoglobin 15.8 13.3 - 17.7 g/dL    Hematocrit 46.5 40.0 - 53.0 %    MCV 97 78 - 100 fl    MCH 32.8 26.5 - 33.0 pg    MCHC 34.0 31.5 - 36.5 g/dL    RDW 15.0 10.0 - 15.0 %    Platelet Count 251 150 - 450  10e9/L    Diff Method Automated Method     % Neutrophils 74.5 %    % Lymphocytes 11.7 %    % Monocytes 12.3 %    % Eosinophils 0.0 %    % Basophils 0.2 %    % Immature Granulocytes 1.3 %    Nucleated RBCs 0 0 /100    Absolute Neutrophil 7.2 1.6 - 8.3 10e9/L    Absolute Lymphocytes 1.1 0.8 - 5.3 10e9/L    Absolute Monocytes 1.2 0.0 - 1.3 10e9/L    Absolute Eosinophils 0.0 0.0 - 0.7 10e9/L    Absolute Basophils 0.0 0.0 - 0.2 10e9/L    Abs Immature Granulocytes 0.1 0 - 0.4 10e9/L    Absolute Nucleated RBC 0.0    Lactate Dehydrogenase   Result Value Ref Range    Lactate Dehydrogenase 375 (H) 85 - 227 U/L   Reticulocyte count   Result Value Ref Range    % Retic 1.8 0.5 - 2.0 %    Absolute Retic 84.8 25 - 95 10e9/L   Hemoglobin A1c   Result Value Ref Range    Hemoglobin A1C 5.9 (H) 0 - 5.6 %   Wound Culture Aerobic Bacterial    Specimen: Drainage    LVAD DRIVELINE EXIT SITE~Wound   Result Value Ref Range    Specimen Description Drainage LVAD DRIVELINE EXIT SITE Wound     Special Requests Specimen collected in eSwab transport (white cap)     Culture Micro PENDING    Gram stain    Specimen: Drainage    LVAD DRIVELINE EXIT SITE~Wound   Result Value Ref Range    Specimen Description Drainage LVAD DRIVELINE EXIT SITE Wound     Special Requests Specimen collected in eSwab transport (white cap)     Gram Stain No organisms seen     Gram Stain Few  WBC'S seen  predominantly PMN's      Potassium   Result Value Ref Range    Potassium 4.0 3.4 - 5.3 mmol/L   Magnesium   Result Value Ref Range    Magnesium 1.8 1.6 - 2.3 mg/dL   US Abdomen Limited    Narrative    EXAMINATION: US ABDOMEN LIMITED, 12/3/2020 3:07 PM     COMPARISON: None.    HISTORY: LVAD with drainage around drive line. Please assess for fluid  collection. Swelling.    TECHNIQUE: Limited grayscale and color imaging of the region of  interest in the left abdominal wall along the patient's LVAD drive  line was performed.    FINDINGS/    Impression    IMPRESSION:  There is  some heterogeneous material superficially just below the skin  surface along the driveline catheter entrance measuring approximately  0.9 x 2.2 x 0.7 cm (craniocaudad by transverse by AP). There is also a  heterogeneous hypoechoic/anechoic area along the drive line just  inferior to this measuring approximately 3.8 x 2.3 x 1.2 cm  (craniocaudad by transverse by AP). These collections are best  appreciated on the cine images. Material in these regions appears  somewhat heterogeneous, especially in the superficial region and  presumably is infected given the provided history.     NASEEM ESCOBEDO MD

## 2020-12-03 NOTE — PLAN OF CARE
"Admitted/transferred from: [place], [place] emergency room   Reason for admission/transfer: management of patient with an LVAD, respiratory failure r/t COVID 19  Patient status upon admission/transfer: stable  Interventions: patient on 6L oxy plus mask, tachypneic but states \"breathing feels better\". Assessment and plan reviewed with patient and MD.   Plan: continue to monitor, wean FiO2 as appropriate. Resent COVID-19 swab.    2 RN skin assessment: completed by Cabrera and Justin Crawford  Result of skin assessment and interventions/actions: generalized bruising and scabs, LVAD driveline site with hyperpigmented edges, and serous drainage. Otherwise intact.   Height, weight, drug calc weight: done  Patient belongings: the patient's bag of home medications was labeled and sent to security. The patient's clothing including pants, shirts, socks, a vest, and a hat; cell phone, , and wallet remain with the patient per his request.   MDRO education (if applicable): done    Cabrera Ordonez RN      "

## 2020-12-04 NOTE — PHARMACY-ANTICOAGULATION SERVICE
Warfarin Therapy Hold Note  This patient is currently receiving warfarin for LVAD.    Goal INR:  2-3.      Anticoagulation Dose History     Recent Dosing and Labs Latest Ref Rng & Units 9/28/2020 10/5/2020 10/12/2020 10/21/2020 11/24/2020 12/3/2020 12/4/2020    INR 0.86 - 1.14 4.1(A) 3.7(A) 3.9(A) 2.22(A) 3.9(A) 4.25(H) 5.32(HH)    Factor 2 60 - 140 % - - - - - - -            Bleeding Signs/Symptoms:  None    Assessment:  Current INR Level is supratherapeutic.  This is most likely due to: nutrition changes,  stress from acute illness    Plan:  1) HOLD today s warfarin dose as INR continues to trend upward.   An order has been placed in EPIC for  Warfarin- No Dose Today    2) Do not recommend reversal with vitamin K or FFP at this time.  3) Recheck next INR: tomorrow with AM labs    The primary team has been contacted about the above plan/primary team is aware of need to hold dose/team notification not necessary.      Truong Vides, Pharm.D, BCPS

## 2020-12-04 NOTE — PROVIDER NOTIFICATION
-------------------CRITICAL LAB VALUE-------------------    Lab Value: troponin 0.475, INR 5.32  Time of notification: 6:52 AM  MD notified: MD David ([employer])  Patient status: unchanged  Temp:  [97.7  F (36.5  C)-99.1  F (37.3  C)] 97.8  F (36.6  C)  Pulse:  [] 135  Resp:  [11-45] 30  BP: ()/() 88/74  SpO2:  [90 %-97 %] 95 %  Orders received: pending

## 2020-12-04 NOTE — CONSULTS
[facility]   Cardiology Progress Note    Assessment & Plan   Danielito Chu is a 65 year old male admitted on 12/3/2020 with COVID pneumonia. He has a history of NICM (LVEF at dong of 15%) s/p ICD (4/2017), who underwent HM3 LVAD implant on 12/5/18 as DT, now BTT. He has a history of atrial arrythmia, s/p ICD, on amiodarone and metoprolol. He was admitted as transfer to MICU from [place] ED for ARHF 2/2 COVID pneumonia. 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 He has been transferred out of MICU and is on oxymask for oxygenation. Over the course of the day his respiratory status has progressively deteriorated and he is ultimately transferred back to the MICU and emergently intubated.    #Covid Pneumonia  #Acute hypoxic respiratory failure  - appreciate cares per primary medical team  - on remdesivir and dexamethasone    #COVID coagulopathy  #Supratherapuetic INR  - hold warfarin  - when INR trends to therapeutic level, would start high-intensity heparin gtt  - trend LDH    #Persistent atrial tachycardia  - stop IV amiodarone  - continue PO amiodarone and metoprolol for atrial tachycardia, no need to aggressively rate control given presence of LVAD     #NICM s/p LVAD 12/5/18  - ok to hold PTA spironolactone and losartan given tenuous hemodynamics  - ok to hold bumex given tenuous hemodynamics    #LVAD outflow thrombus  #LVAD driveline infection  - follow up CT surgery recommendations regarding   - no changes to LVAD parameters today  - f/u microbiology/cultures of driveline exudate  - appreciate ID recommendations  - continue doxycycline and ceftriaxone    #Elevated troponin, demand ischemia vs myocarditis  - stat ECHO  - trend troponin    Pt was seen and examined with Dr. Weems, who agrees with the assessment and plan.    Chico Ventura MD  Cardiology Fellow  Pager: [phone]  Amcom: [phone]    Interval History   Transferred out of MICU to medical floor. After  "ultrasound showed subcutaneous fluid at the driveline site, a CT of the abdomen was obtained which incidentally found a large thrombus in the LVAD outflow cannula. CT surgery has been consulted for assistance. Otherwise, the patient continues to have subjective shortness of breath and paroxysms of cough. He is afebrile. Denies chills or nightsweats. No chest pain or palpitations. LVAD parameters stable.    On afternoon rounds the patient was noted to be tachypneic and hypoxic requiring high-flow nasal cannula. He became acutely encephalopathic. He was tachycardic with otherwise stable MAPs. Was transferred to MICU and emergently intubated. Our team was in direct communication with the accepting MICU team and relayed our thoughts and recommendations directly.     ROS: A 4-point ROS was otherwise negative except as above.    Data reviewed today: I reviewed all new labs and imaging results over the last 24 hours. I personally reviewed:    Physical Exam   Temp: 96.8  F (36  C) Temp src: Axillary BP: 91/53 Pulse: 131   Resp: (!) 34 SpO2: 94 % O2 Device: Oxymask Oxygen Delivery: 10 LPM  Vitals:    12/03/20 0930 12/04/20 0000   Weight: 101.7 kg (224 lb 3.3 oz) 103.2 kg (227 lb 8.2 oz)     Vital Signs with Ranges  Temp:  [96.8  F (36  C)-99.1  F (37.3  C)] 96.8  F (36  C)  Pulse:  [] 131  Resp:  [11-45] 34  BP: ()/() 91/53  SpO2:  [90 %-97 %] 94 %  I/O last 3 completed shifts:  In: 1260 [P.O.:1260]  Out: 1150 [Urine:1150]     , Blood pressure 91/53, pulse 131, temperature 96.8  F (36  C), temperature source Axillary, resp. rate (!) 34, height 1.778 m (5' 10\"), weight 103.2 kg (227 lb 8.2 oz), SpO2 94 %.  227 lbs 8.24 oz  GEN:  Alert, appears uncomfortable lying in hospital bed  CV:  LVAD hum   LUNGS:  On osymask, paroxysms of cough  ABD:  Soft, NT, ND  EXT:  No edema or cyanosis.    SKIN: Warm and dry, no lesions on exposed surfaces. Driveline is freshly bandaged and clean    Medications     Warfarin " Therapy Reminder         allopurinol  200 mg Oral Daily     amiodarone  200 mg Oral Daily     aspirin  81 mg Oral Daily     cefTRIAXone  1 g Intravenous Q24H     dexamethasone  6 mg Oral Q24H     doxycycline hyclate  100 mg Oral Q12H VITALY     lidocaine  1 patch Transdermal Q24h    And     lidocaine   Transdermal Q8H     [Held by provider] losartan  25 mg Oral Daily     metoprolol succinate ER  37.5 mg Oral Daily     pantoprazole  20 mg Oral QAM AC     polyethylene glycol  17 g Oral Daily     potassium & sodium phosphates  1 packet Oral TID     remdesivir  100 mg Intravenous Q24H     senna-docusate  1 tablet Oral At Bedtime     spironolactone  37.5 mg Oral Daily     thiamine  100 mg Oral Daily       Data   Recent Labs   Lab 12/04/20  0540 12/03/20  2348 12/03/20  1406 12/03/20  1108   WBC 10.6  --   --  9.7   HGB 16.7  --   --  15.8   MCV 97  --   --  97     --   --  251   INR 5.32*  --   --  4.25*    135  --  137   POTASSIUM 4.1 4.3 4.0 3.6   CHLORIDE 110* 108  --  108   CO2 18* 19*  --  20   BUN 11 12  --  11   CR 0.97 1.07  --  1.18   ANIONGAP 11 8  --  8   ASHLEE 9.0 8.6  --  8.2*   * 159*  --  110*   ALBUMIN  --   --   --  2.6*   PROTTOTAL  --   --   --  6.6*   BILITOTAL  --   --   --  1.2   ALKPHOS  --   --   --  124   ALT  --   --   --  27   AST  --   --   --  39   TROPI 0.475*  --   --   --        Recent Results (from the past 24 hour(s))   XR Chest Port 1 View    Narrative    EXAM: XR CHEST PORT 1 VW 12/3/2020 10:44 AM      HISTORY: COVID.    COMPARISON: Chest x-ray dated 12/17/2018, 12/14/2018.     TECHNIQUE: Frontal view of the chest.    FINDINGS: Trachea is midline. Postsurgical changes of LVAD placement  with mid median sternotomy wire fractured. Otherwise wires are intact.  ICD overlying the left chest wall. Low inspiratory volume.  Interstitial and airspace opacities in the right upper lung and left  lower lung. Right elevated hemidiaphragm. No pleural effusions. No  pneumothoraces. No  acute osseous process.      Impression    IMPRESSION:   1. Interstitial and airspace opacities in the right upper lung and  left lower lung, which may represent pulmonary edema, infection, or  atelectasis.  2. Elevated right hemidiaphragm.  3. Stable support devices.    I have personally reviewed the examination and initial interpretation  and I agree with the findings.    COLLEEN SANDOVAL MD   US Abdomen Limited    Narrative    EXAMINATION: US ABDOMEN LIMITED, 12/3/2020 3:07 PM     COMPARISON: None.    HISTORY: LVAD with drainage around drive line. Please assess for fluid  collection. Swelling.    TECHNIQUE: Limited grayscale and color imaging of the region of  interest in the left abdominal wall along the patient's LVAD drive  line was performed.    FINDINGS/    Impression    IMPRESSION:  There is some heterogeneous material superficially just below the skin  surface along the driveline catheter entrance measuring approximately  0.9 x 2.2 x 0.7 cm (craniocaudad by transverse by AP). There is also a  heterogeneous hypoechoic/anechoic area along the drive line just  inferior to this measuring approximately 3.8 x 2.3 x 1.2 cm  (craniocaudad by transverse by AP). These collections are best  appreciated on the cine images. Material in these regions appears  somewhat heterogeneous, especially in the superficial region and  presumably is infected given the provided history.     NASEEM ESCOBEDO MD   CT Abdomen Pelvis w Contrast   Result Value    Radiologist flags (Urgent)     Filling defect of the LVAD outflow cannula concerning    Narrative    CT of the Abdomen and Pelvis without contrast, 12/3/2020 8:38 PM.    Comparison: CT 10/20/2018.    History: assess LVAD infection site (may need cuts of chest as well).     Technique: Axial images of the  abdomen and pelvis were obtained  without contrast. Coronal reconstructions were provided. Images were  reviewed in bone, lung, and soft tissue windows.    Total DLP: 9392  mGy*cm.    Findings:  Exam and is partially degraded by motion artifact.    Chest: The visualized esophagus appears unremarkable. Patchy  groundglass opacities of the lung bases. Trace bilateral pleural  effusions. LVAD placement. Cardiomegaly. Partial visualization LVAD..  Filling defect of the outflow cannula. LVAD drive line exits  percutaneously through the the left upper abdominal quadrant.  Hyperattenuation of the soft tissues adjacent to the drive line  without focal fluid collection.    Abdomen and Pelvis: Nodular margin of the liver capsule evaluation is  partially limited by motion artifact. No opaque gallbladder calculi.  No intrahepatic or extrahepatic biliary dilatation.  Pancreas  unremarkable. Spleen size within normal limits. No suspicious adrenal  mass lesions. No evidence of hydronephrosis. Visualized ureters and  urinary bladder is unremarkable.  Bilateral fat-containing hernias. No  free fluid. Diverticulosis without evidence of diverticulitis. Normal  caliber large and small bowel.   Atheromatous calcifications of the  aortoiliac vasculature. No suspicious or enlarged mesenteric,  retroperitoneal and pelvic lymph nodes.     Bones and Soft Tissues: Degenerative change of the spine. Compression  fracture of T7 with approximately 30% loss of vertebral body height.  Bilateral gynecomastia.      Impression    Impression:   1. Filling defect of the LVAD outflow cannula concerning for thrombus.  2. Hyperattenuation of the soft tissues adjacent to the left upper  abdominal quadrant drive line without focal fluid collection. Findings  can be seen in setting of inflammation or infection  3. Patchy groundglass opacities in the lung bases consistent with  known COVID-19 infection.  4. Nodular margin of the liver capsule which may represent cirrhosis  although evaluation is limited by motion artifact.  5. Compression deformity of T7 with approximately 30 percent loss of  vertebral body height.    [Urgent  Result: Filling defect of the LVAD outflow cannula concerning  for thrombus.]    Finding was identified on 12/3/2020 8:48 PM.     Provider Audrey was contacted by Dr. Sanders at 12/3/2020 9:14 PM and  verbalized understanding of the urgent finding.      CTA Chest with Contrast    Impression    Impression:  1. Redemonstration of thrombus extending throughout the entirety of  the LVAD outflow cannula with almost 50% stenosis approximately 2.6 cm  from the aortic anastomosis.  2. Small amount of nonocclusive thrombus within the proximal celiac  artery.  3. Diffuse patchy groundglass opacities and interlobular septal  thickening consistent with known COVID-19 pneumonia.  4. Stable hazy soft tissue hyperattenuation in the left upper quadrant  near the LVAD drive line without focal fluid collection. Findings  can be seen in setting of inflammation or infection.  5. Stable compression deformity of T7. Age indeterminate compression  deformity of T4 with approximately 40% vertebral body height loss, new  since 12/8/2018.  6. Cirrhotic appearance of the liver without focal mass.    Imaging findings discussed with Dr. Mondragon by Dr. Trev Thomas at  2:45AM on 12/4/2020.

## 2020-12-04 NOTE — CONSULTS
Initial Psychiatric Consult   Consult date: December 4, 2020         Reason for Consult, requesting source:    Depression and anxiety.   Requesting source: Windy Kauffman    Telemedicine Visit: The patient was seen for a visit utilizing the [employer] system. Permission from the patient to conduct the exam by telemedicine was obtained prior to proceeding.  He was also informed that insurance will be billed for this contact.   Start Time: 11:15  Stop Time: 11:40  Patient Location):  [facility]   Provider Location: [facility]  As the provider I attest to compliance with applicable laws and regulations related to telemedicine          HPI:   This 65 year old man with long history of congestive heart failure had an LVAD placed 2 years ago.  He is admitted due to being COVID positive (caught it from his  apparently).  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  He is quite nervous about having to undergo surgery again.   He is seen today for psychiatric evaluation because the primary team is concerned about him having trouble with anxiety and depression.  He says is not particularly depressed or anxious but is having a lot of trouble processing his health status.  The decline over the last 4 to 5 years has been significant and he is having hard time coping with it.  He is interested in having someone talk with him about it.  He is not interested in medications.  He denies depressed mood, loss of interest usual activities, sleep difficulties, not hopeless or suicidal.  He does have some anxiety about his health issues but no panic attacks.  No significant mood cycling or manic symptoms.         Past Psychiatric History:   No psychiatric hospitalizations.  No prior use of psychiatric medications or psychotherapy.        Substance Use and History:   He used to smoke some cannabis but gave this up to his heart disease and being on the  transplant list.  Quit smoking cigarettes years ago.  No history of heavy alcohol use.        Past Medical History:   PAST MEDICAL HISTORY:   Past Medical History:   Diagnosis Date     Acute systolic congestive heart failure (H) 2017     Diabetes (H)      HTN (hypertension)      Hyperlipidemia      Liver disease      LVAD (left ventricular assist device) present (H)     HM 3      Marijuana abuse      Mitral regurgitation      Nocturnal oxygen desaturation 3/29/2018     Nonischemic cardiomyopathy (H) 2017     SHIRLEY (obstructive sleep apnea)      Pacemaker 2017    ICD 17     Situational mixed anxiety and depressive disorder        PAST SURGICAL HISTORY:   Past Surgical History:   Procedure Laterality Date     CV RIGHT HEART CATH N/A 2019    Procedure: CV RIGHT HEART CATH;  Surgeon: Jules Allan MD;  Location:  HEART CARDIAC CATH LAB     CV RIGHT HEART CATH N/A 2020    Procedure: CV RIGHT HEART CATH;  Surgeon: Jeremy Mckeon MD;  Location:  HEART CARDIAC CATH LAB     ESOPHAGOSCOPY, GASTROSCOPY, DUODENOSCOPY (EGD), COMBINED N/A 2018    Procedure: COMBINED ESOPHAGOSCOPY, GASTROSCOPY, DUODENOSCOPY (EGD);  Surgeon: Candido Mendez MD;  Location:  GI     IMPLANT IMPLANTABLE CARDIOVERTER DEFIBRILLATOR       INSERT VENTRICULAR ASSIST DEVICE LEFT (HEARTMATE II/III) MINIMALLY INVASIVE N/A 2018    Procedure: Partial Median Sternotomy, Left Thoracotomy, Minimally Invasive Left Ventricular Assist Device Placement (Heartmate III), On Cardiopulmonary Bypass;  Surgeon: Andrei Silva MD;  Location:  OR             Family History:   FAMILY HISTORY:   Family History   Problem Relation Age of Onset     Heart Failure Father          at age 86     Heart Failure Paternal Uncle          at 66      Myocardial Infarction Paternal Uncle          at age 62     Coronary Artery Disease Mother          during CABG at age 41     Negative for significant  mental illness or substance use problems        Social History:   SOCIAL HISTORY:   Social History     Tobacco Use     Smoking status: Former Smoker     Packs/day: 0.30     Years: 20.00     Pack years: 6.00     Types: Cigarettes     Smokeless tobacco: Never Used     Tobacco comment: quit 3/2017   Substance Use Topics     Alcohol use: No     Frequency: Never     Comment: rare     [place] in [place]  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  He has been on a number of long-term relationships, but never  and no children.  He does live alone but has some social contacts and does not feel lonesome.         Physical ROS:   The patient endorsed cough and shortness of breath. The remainder of 10-point review of systems was negative except as noted in HPI.           Medications:     Current Facility-Administered Medications   Medication     allopurinol (ZYLOPRIM) tablet 200 mg     amiodarone (PACERONE) tablet 200 mg     aspirin (ASA) chewable tablet 81 mg     cefTRIAXone (ROCEPHIN) 1 g vial to attach to  mL bag for ADULTS or NS 50 mL bag for PEDS     dexamethasone (DECADRON) tablet 6 mg     glucose gel 15-30 g    Or     dextrose 50 % injection 25-50 mL    Or     glucagon injection 1 mg     doxycycline hyclate (VIBRAMYCIN) capsule 100 mg     Lidocaine (LIDOCARE) 4 % Patch 1 patch    And     lidocaine patch in PLACE     [Held by provider] losartan (COZAAR) tablet 25 mg     menthol (Topical Analgesic) 2.5% (BENGAY VANISHIN SCENT) 2.5 % topical gel     metoprolol succinate (TOPROL-XL) half-tab 37.5 mg     pantoprazole (PROTONIX) EC tablet 20 mg     polyethylene glycol (MIRALAX) Packet 17 g     polyethylene glycol (MIRALAX) Packet 17 g     remdesivir 100 mg in sodium chloride 0.9 % 250 mL intermittent infusion     senna-docusate (SENOKOT-S/PERICOLACE) 8.6-50 MG per tablet 1 tablet    Or     senna-docusate  (SENOKOT-S/PERICOLACE) 8.6-50 MG per tablet 2 tablet     senna-docusate (SENOKOT-S/PERICOLACE) 8.6-50 MG per tablet 1 tablet     spironolactone (ALDACTONE) half-tab 37.5 mg     thiamine (B-1) tablet 100 mg     Warfarin Therapy Reminder (Check START DATE - warfarin may be starting in the FUTURE)     warfarin-No DOSE today              Allergies:   No Known Allergies       Labs:     Recent Results (from the past 48 hour(s))   EKG 12-lead, complete    Collection Time: 12/03/20  9:48 AM   Result Value Ref Range    Interpretation ECG Click View Image link to view waveform and result    Symptomatic COVID-19 Virus (Coronavirus) by PCR    Collection Time: 12/03/20 11:00 AM    Specimen: Nasopharyngeal   Result Value Ref Range    COVID-19 Virus PCR to [facility] - Source Swab     COVID-19 Virus PCR to [facility] - Result       Test received-See reflex to IDDL test SARS CoV2 (COVID-19) Virus RT-PCR   Blood gas arterial with oxyhemoglobin    Collection Time: 12/03/20 11:00 AM   Result Value Ref Range    pH Arterial 7.43 7.35 - 7.45 pH    pCO2 Arterial 26 (L) 35 - 45 mm Hg    pO2 Arterial 79 (L) 80 - 105 mm Hg    Bicarbonate Arterial 17 (L) 21 - 28 mmol/L    FIO2 52     Oxyhemoglobin Arterial 95 92 - 100 %    Base Deficit Art 5.1 mmol/L   SARS-CoV-2 COVID-19 Virus (Coronavirus) RT-PCR Nasopharyngeal    Collection Time: 12/03/20 11:00 AM    Specimen: Nasopharyngeal   Result Value Ref Range    SARS-CoV-2 Virus Specimen Source Swab     SARS-CoV-2 PCR Result POSITIVE (AA)     SARS-CoV-2 PCR Comment       Testing was performed using the [employer] COVID-19 Direct Assay on the [employer] Liaison MDX   instrument. Additional information about this Emergency Use Authorization (EUA) assay can   be found via the Lab Guide.     D dimer quantitative    Collection Time: 12/03/20 11:08 AM   Result Value Ref Range    D Dimer 1.3 (H) 0.0 - 0.50 ug/ml FEU   CRP inflammation    Collection Time: 12/03/20 11:08 AM   Result Value Ref Range    CRP Inflammation  130.0 (H) 0.0 - 8.0 mg/L   Procalcitonin    Collection Time: 12/03/20 11:08 AM   Result Value Ref Range    Procalcitonin 0.19 ng/ml   Fibrinogen activity    Collection Time: 12/03/20 11:08 AM   Result Value Ref Range    Fibrinogen 646 (H) 200 - 420 mg/dL   INR    Collection Time: 12/03/20 11:08 AM   Result Value Ref Range    INR 4.25 (H) 0.86 - 1.14   Calcium ionized whole blood    Collection Time: 12/03/20 11:08 AM   Result Value Ref Range    Calcium Ionized Whole Blood 4.2 (L) 4.4 - 5.2 mg/dL   Phosphorus    Collection Time: 12/03/20 11:08 AM   Result Value Ref Range    Phosphorus 2.2 (L) 2.5 - 4.5 mg/dL   Magnesium    Collection Time: 12/03/20 11:08 AM   Result Value Ref Range    Magnesium 1.8 1.6 - 2.3 mg/dL   Comprehensive metabolic panel    Collection Time: 12/03/20 11:08 AM   Result Value Ref Range    Sodium 137 133 - 144 mmol/L    Potassium 3.6 3.4 - 5.3 mmol/L    Chloride 108 94 - 109 mmol/L    Carbon Dioxide 20 20 - 32 mmol/L    Anion Gap 8 3 - 14 mmol/L    Glucose 110 (H) 70 - 99 mg/dL    Urea Nitrogen 11 7 - 30 mg/dL    Creatinine 1.18 0.66 - 1.25 mg/dL    GFR Estimate 65 >60 mL/min/[1.73_m2]    GFR Estimate If Black 75 >60 mL/min/[1.73_m2]    Calcium 8.2 (L) 8.5 - 10.1 mg/dL    Bilirubin Total 1.2 0.2 - 1.3 mg/dL    Albumin 2.6 (L) 3.4 - 5.0 g/dL    Protein Total 6.6 (L) 6.8 - 8.8 g/dL    Alkaline Phosphatase 124 40 - 150 U/L    ALT 27 0 - 70 U/L    AST 39 0 - 45 U/L   CBC with platelets differential    Collection Time: 12/03/20 11:08 AM   Result Value Ref Range    WBC 9.7 4.0 - 11.0 10e9/L    RBC Count 4.82 4.4 - 5.9 10e12/L    Hemoglobin 15.8 13.3 - 17.7 g/dL    Hematocrit 46.5 40.0 - 53.0 %    MCV 97 78 - 100 fl    MCH 32.8 26.5 - 33.0 pg    MCHC 34.0 31.5 - 36.5 g/dL    RDW 15.0 10.0 - 15.0 %    Platelet Count 251 150 - 450 10e9/L    Diff Method Automated Method     % Neutrophils 74.5 %    % Lymphocytes 11.7 %    % Monocytes 12.3 %    % Eosinophils 0.0 %    % Basophils 0.2 %    % Immature  Granulocytes 1.3 %    Nucleated RBCs 0 0 /100    Absolute Neutrophil 7.2 1.6 - 8.3 10e9/L    Absolute Lymphocytes 1.1 0.8 - 5.3 10e9/L    Absolute Monocytes 1.2 0.0 - 1.3 10e9/L    Absolute Eosinophils 0.0 0.0 - 0.7 10e9/L    Absolute Basophils 0.0 0.0 - 0.2 10e9/L    Abs Immature Granulocytes 0.1 0 - 0.4 10e9/L    Absolute Nucleated RBC 0.0    Lactate Dehydrogenase    Collection Time: 12/03/20 11:08 AM   Result Value Ref Range    Lactate Dehydrogenase 375 (H) 85 - 227 U/L   Reticulocyte count    Collection Time: 12/03/20 11:08 AM   Result Value Ref Range    % Retic 1.8 0.5 - 2.0 %    Absolute Retic 84.8 25 - 95 10e9/L   Hemoglobin A1c    Collection Time: 12/03/20 11:08 AM   Result Value Ref Range    Hemoglobin A1C 5.9 (H) 0 - 5.6 %   Wound Culture Aerobic Bacterial    Collection Time: 12/03/20 11:30 AM    Specimen: Drainage    LVAD DRIVELINE EXIT SITE~Wound   Result Value Ref Range    Specimen Description Drainage LVAD DRIVELINE EXIT SITE Wound     Special Requests Specimen collected in eSwab transport (white cap)     Culture Micro Culture in progress    Gram stain    Collection Time: 12/03/20 11:30 AM    Specimen: Drainage    LVAD DRIVELINE EXIT SITE~Wound   Result Value Ref Range    Specimen Description Drainage LVAD DRIVELINE EXIT SITE Wound     Special Requests Specimen collected in eSwab transport (white cap)     Gram Stain No organisms seen     Gram Stain Few  WBC'S seen  predominantly PMN's      Potassium    Collection Time: 12/03/20  2:06 PM   Result Value Ref Range    Potassium 4.0 3.4 - 5.3 mmol/L   Magnesium    Collection Time: 12/03/20  2:06 PM   Result Value Ref Range    Magnesium 1.8 1.6 - 2.3 mg/dL   Blood culture    Collection Time: 12/03/20  8:24 PM    Specimen: Blood   Result Value Ref Range    Specimen Description Blood     Special Requests Right Hand     Culture Micro No growth after 11 hours    CT Abdomen Pelvis w Contrast    Collection Time: 12/03/20  8:38 PM   Result Value Ref Range     Radiologist flags (Urgent)      Filling defect of the LVAD outflow cannula concerning   Blood culture    Collection Time: 12/03/20  8:53 PM    Specimen: Blood   Result Value Ref Range    Specimen Description Blood     Special Requests Left Hand     Culture Micro No growth after 11 hours    Glucose by meter    Collection Time: 12/03/20 10:17 PM   Result Value Ref Range    Glucose 202 (H) 70 - 99 mg/dL   Basic metabolic panel    Collection Time: 12/03/20 11:48 PM   Result Value Ref Range    Sodium 135 133 - 144 mmol/L    Potassium 4.3 3.4 - 5.3 mmol/L    Chloride 108 94 - 109 mmol/L    Carbon Dioxide 19 (L) 20 - 32 mmol/L    Anion Gap 8 3 - 14 mmol/L    Glucose 159 (H) 70 - 99 mg/dL    Urea Nitrogen 12 7 - 30 mg/dL    Creatinine 1.07 0.66 - 1.25 mg/dL    GFR Estimate 73 >60 mL/min/[1.73_m2]    GFR Estimate If Black 84 >60 mL/min/[1.73_m2]    Calcium 8.6 8.5 - 10.1 mg/dL   Glucose by meter    Collection Time: 12/04/20 12:06 AM   Result Value Ref Range    Glucose 145 (H) 70 - 99 mg/dL   Glucose by meter    Collection Time: 12/04/20  3:37 AM   Result Value Ref Range    Glucose 118 (H) 70 - 99 mg/dL   Basic metabolic panel    Collection Time: 12/04/20  5:40 AM   Result Value Ref Range    Sodium 138 133 - 144 mmol/L    Potassium 4.1 3.4 - 5.3 mmol/L    Chloride 110 (H) 94 - 109 mmol/L    Carbon Dioxide 18 (L) 20 - 32 mmol/L    Anion Gap 11 3 - 14 mmol/L    Glucose 126 (H) 70 - 99 mg/dL    Urea Nitrogen 11 7 - 30 mg/dL    Creatinine 0.97 0.66 - 1.25 mg/dL    GFR Estimate 82 >60 mL/min/[1.73_m2]    GFR Estimate If Black >90 >60 mL/min/[1.73_m2]    Calcium 9.0 8.5 - 10.1 mg/dL   Magnesium    Collection Time: 12/04/20  5:40 AM   Result Value Ref Range    Magnesium 2.2 1.6 - 2.3 mg/dL   Phosphorus    Collection Time: 12/04/20  5:40 AM   Result Value Ref Range    Phosphorus 2.8 2.5 - 4.5 mg/dL   INR    Collection Time: 12/04/20  5:40 AM   Result Value Ref Range    INR 5.32 () 0.86 - 1.14   CBC with Platelets & Differential     "Collection Time: 12/04/20  5:40 AM   Result Value Ref Range    WBC 10.6 4.0 - 11.0 10e9/L    RBC Count 5.09 4.4 - 5.9 10e12/L    Hemoglobin 16.7 13.3 - 17.7 g/dL    Hematocrit 49.4 40.0 - 53.0 %    MCV 97 78 - 100 fl    MCH 32.8 26.5 - 33.0 pg    MCHC 33.8 31.5 - 36.5 g/dL    RDW 15.3 (H) 10.0 - 15.0 %    Platelet Count 323 150 - 450 10e9/L    Diff Method Automated Method     % Neutrophils 82.9 %    % Lymphocytes 8.0 %    % Monocytes 8.0 %    % Eosinophils 0.0 %    % Basophils 0.1 %    % Immature Granulocytes 1.0 %    Nucleated RBCs 0 0 /100    Absolute Neutrophil 8.8 (H) 1.6 - 8.3 10e9/L    Absolute Lymphocytes 0.9 0.8 - 5.3 10e9/L    Absolute Monocytes 0.8 0.0 - 1.3 10e9/L    Absolute Eosinophils 0.0 0.0 - 0.7 10e9/L    Absolute Basophils 0.0 0.0 - 0.2 10e9/L    Abs Immature Granulocytes 0.1 0 - 0.4 10e9/L    Absolute Nucleated RBC 0.0    Lactate Dehydrogenase    Collection Time: 12/04/20  5:40 AM   Result Value Ref Range    Lactate Dehydrogenase 506 (H) 85 - 227 U/L   Fibrinogen activity    Collection Time: 12/04/20  5:40 AM   Result Value Ref Range    Fibrinogen 701 (H) 200 - 420 mg/dL   CRP inflammation    Collection Time: 12/04/20  5:40 AM   Result Value Ref Range    CRP Inflammation 190.0 (H) 0.0 - 8.0 mg/L   D dimer quantitative    Collection Time: 12/04/20  5:40 AM   Result Value Ref Range    D Dimer 1.3 (H) 0.0 - 0.50 ug/ml FEU   Troponin I    Collection Time: 12/04/20  5:40 AM   Result Value Ref Range    Troponin I ES 0.475 (HH) 0.000 - 0.045 ug/L   EKG 12-lead, tracing only    Collection Time: 12/04/20  5:40 AM   Result Value Ref Range    Interpretation ECG Click View Image link to view waveform and result    Glucose by meter    Collection Time: 12/04/20  8:01 AM   Result Value Ref Range    Glucose 106 (H) 70 - 99 mg/dL          Physical and Psychiatric Examination:     /77 (BP Location: Left arm)   Pulse 138   Temp 97.7  F (36.5  C) (Axillary)   Resp 26   Ht 1.778 m (5' 10\")   Wt 103.2 kg " "(227 lb 8.2 oz)   SpO2 97%   BMI 32.65 kg/m    Weight is 227 lbs 8.24 oz  Body mass index is 32.65 kg/m .    Physical Exam:  I have reviewed the physical exam as documented by by the medical team and agree with findings and assessment and have no additional findings to add at this time.    Mental Status Exam:  Lying quietly in the hospital bed, oxygen mask in place. Is a bit dyspneic.He is well groomed, pleasant, cooperative. Speech fluent. Moves upper extremities without difficulty. Associations tight. Mood is \"fair.\"  Affect congruent. Thought process logical, linear. Thought content negative for suicidal thoughts or delusions. Oriented x2 (not date).  Recent and remote memory, attention span and concentration are intact. Fund of knowledge, use of language appropriate. Insight and judgment good.              DSM-5 Diagnosis:   Adjustment disorder, mixed           Assessment:   He is having some difficulty coping with his current medical status, and is quite anxious about possible upcoming surgery.  He is not interested in any psychiatric medications, but would like someone to talk to about his situation.          Summary of Recommendations:   I have put in a health psychology consultation.  He understands that an iPad will be used.  .Page me at [phone], or re-consult psychiatry as needed.        \"This dictation was performed with voice recognition software and may contain errors,  omissions and inadvertent word substitution.\"           "

## 2020-12-04 NOTE — PLAN OF CARE
Neuro: A&Ox4. Neuros intact. Pleasant, able to make needs known.  Cardiac: ST 130s. MAP >70. Afebrile. LVAD parameters WNL.   Respiratory: Sating >90% on 9L oximask. Frequent reminders to keep mask on. Tachypneic, BAJWA.  GI/: Adequate urine output. No BM.  Diet/appetite: Tolerating 2gm Na diet. Poor appetite.  Activity:  Assist of 1. Independently repositioning in bed.  Pain: Denies.   Skin: Old sores on bottom, healed.   LDA's: PIVx2: SL    Plan: Abdominal CT completed, awaiting results. Continue with POC. Notify primary team with changes.

## 2020-12-04 NOTE — CONSULTS
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      Problems  1. LVAD drive line drainage (chronic), concern for infection (acute vs. Chronic). Hyperattenuation of soft tissue adjacent to drive line without fluid collection on CT   2. 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 Acute hypoxic respiratory failure 2/2 COVID PNA  4. Nonocclusive thrombus of proximal celiac artery     RECOMMENDATION:  1. Continue Doxycyline for possible drive line infection. Ceftriaxone will also provide some coverage.   2. Follow Aerobic culture from drive line exit site. Recommend getting an anaerobic culture as well.   3. Follow blood culture  4. Agree with dexamethasone, remdemsivir, and ceftriaxone for covid and possible CAP.    Thank you for this consult. ID will continue to follow.     Patient was discussed with Dr. Rios.     Oly Price MD    ID Staff Assessment and Plan:   Patient was seen and examined by me with the resident MD. On my exam the of the LVAD drive exit site I saw the increased brown pigmentation around the exit site but no erythema and no purulent discharge. CT scan reviewed by me. The patient was in respiratory distress from the COVID-19 pneumonia prior to intubation. The ID resident note above reflects our joint assessment and plan, which I discussed with the resident in detail.     Neha Rios MD  ID Staff Physician  Pager [phone]        ________________________________________________________________    Consult Question:.  Admission Diagnosis: Covid Positive,  LVAD  COVID-19         History of Present Illness:   Jeff is a 66 y/o male with history of HFrEF s/p ICD 2017 and LVAD 2018 admitted 12/3 from [facility] for Acute hypoxic respiratory failure secondary to COVID PNA.     History obtained from patient and chart review.     Jeff was feeling well until about 2 weeks ago when he started feeling short of breath. He was dyspneic on exertion and mildly at rest. He was also coughing at that time, productive of green sputum. He did not check his temperature. He progressively worsened and on the evening of 11/29 he had an episode of dizziness but that later resolved. On the morning of 12/1 he was not longer able to go about his normal daily activities which progressively worsened throughout the day and he presented to the ED.     He denies HA, chest pain, n/v/abdominal pain, urinary symptoms or pain in legs.          Review of Systems:   10 point review of system negative unless otherwise noted in the HPI above.          Past Medical History:     Past Medical History:   Diagnosis Date     Acute systolic congestive heart failure (H) 4/5/2017     Diabetes (H)      HTN (hypertension)      Hyperlipidemia      Liver disease      LVAD (left ventricular assist device) present (H)      3 12/18     Marijuana abuse      Mitral regurgitation      Nocturnal oxygen desaturation 3/29/2018     Nonischemic cardiomyopathy (H) 4/5/2017     SHIRLEY (obstructive sleep apnea)      Pacemaker 04/07/2017    ICD 4/7/17     Situational mixed anxiety and depressive disorder             Past Surgical History:     Past Surgical History:   Procedure Laterality Date     CV RIGHT HEART CATH N/A 5/9/2019    Procedure: CV RIGHT HEART CATH;  Surgeon: Jules Allan MD;  Location:  HEART CARDIAC CATH LAB     CV RIGHT HEART CATH N/A 7/2/2020    Procedure: CV RIGHT HEART CATH;  Surgeon: Jeremy Mckeon MD;  Location:  HEART CARDIAC CATH LAB     ESOPHAGOSCOPY, GASTROSCOPY, DUODENOSCOPY (EGD),  COMBINED N/A 2018    Procedure: COMBINED ESOPHAGOSCOPY, GASTROSCOPY, DUODENOSCOPY (EGD);  Surgeon: Candido Mendez MD;  Location:  GI     IMPLANT IMPLANTABLE CARDIOVERTER DEFIBRILLATOR       INSERT VENTRICULAR ASSIST DEVICE LEFT (HEARTMATE II/III) MINIMALLY INVASIVE N/A 2018    Procedure: Partial Median Sternotomy, Left Thoracotomy, Minimally Invasive Left Ventricular Assist Device Placement (Heartmate III), On Cardiopulmonary Bypass;  Surgeon: Andrei Silva MD;  Location:  OR            Family History:   Reviewed and non-contributory.   Family History   Problem Relation Age of Onset     Heart Failure Father          at age 86     Heart Failure Paternal Uncle          at 66      Myocardial Infarction Paternal Uncle          at age 62     Coronary Artery Disease Mother          during CABG at age 41            Social History:     Social History     Tobacco Use     Smoking status: Former Smoker     Packs/day: 0.30     Years: 20.00     Pack years: 6.00     Types: Cigarettes     Smokeless tobacco: Never Used     Tobacco comment: quit 3/2017   Substance Use Topics     Alcohol use: No     Frequency: Never     Comment: rare     History   Sexual Activity     Sexual activity: Not on file            Current Medications:       allopurinol  200 mg Oral Daily     amiodarone  200 mg Oral Daily     aspirin  81 mg Oral Daily     cefTRIAXone  1 g Intravenous Q24H     dexamethasone  6 mg Oral Q24H     doxycycline hyclate  100 mg Oral Q12H VITALY     lidocaine  1 patch Transdermal Q24h    And     lidocaine   Transdermal Q8H     [Held by provider] losartan  25 mg Oral Daily     metoprolol succinate ER  37.5 mg Oral Daily     pantoprazole  20 mg Oral QAM AC     polyethylene glycol  17 g Oral Daily     remdesivir  100 mg Intravenous Q24H     senna-docusate  1 tablet Oral At Bedtime     [START ON 2020] spironolactone  37.5 mg Oral Daily     thiamine  100 mg Oral Daily     warfarin-No DOSE  today  1 each Does not apply no dose today (warfarin)            Allergies:   No Known Allergies         Physical Exam:   Vitals were reviewed  Patient Vitals for the past 24 hrs:   BP Temp Temp src Pulse Resp SpO2 Weight   12/04/20 1448 -- -- -- -- (!) 40 -- --   12/04/20 1117 113/77 97.7  F (36.5  C) Axillary 138 26 97 % --   12/04/20 0750 91/53 96.8  F (36  C) Axillary 131 (!) 34 94 % --   12/04/20 0400 (!) 88/74 97.8  F (36.6  C) Axillary 135 30 95 % --   12/04/20 0200 90/72 -- -- -- -- -- --   12/04/20 0000 (!) 84/74 97.8  F (36.6  C) Oral 127 24 93 % 103.2 kg (227 lb 8.2 oz)   12/03/20 1942 108/71 98  F (36.7  C) Oral 138 28 93 % --   12/03/20 1800 100/57 -- -- 82 12 97 % --   12/03/20 1700 -- -- -- 137 30 93 % --       Physical Examination:  GENERAL: Laying in bed, comfortable however appears dyspneic   HEENT:  Head is normocephalic, atraumatic   EYES:  Eyes have anicteric sclerae without conjunctival injection or stigmata of endocarditis.    NECK:  Supple. No cervical lymphadenopathy  LUNGS: Breathing labored on 9L oxymax. Crackles in bilateral lungs R>L and lower > upper.   CARDIOVASCULAR:  LVAD hum  ABDOMEN:  Normal bowel sounds, soft, nontender.   SKIN:  LVAD site to be examined by attending  NEUROLOGIC:  Grossly nonfocal. Active x4 extremities  Extremities: no lower extremity edema or redness.          Laboratory Data:     Inflammatory Markers    Recent Labs   Lab Test 12/04/20  0540 12/03/20  1108 10/27/18  0605 10/24/18  0634 04/08/17  0715   .0* 130.0* 3.1 5.0  4.4 13.0*       Hematology Studies    Recent Labs   Lab Test 12/04/20  0540 12/03/20  1108 09/03/20 07/24/20  1051 07/02/20  1155 03/04/20  1157 12/21/18  0906 12/21/18  0906 11/13/18  2119 11/13/18  2119 10/30/18  0514 10/29/18  0554   WBC 10.6 9.7 7.7 10.5 10.3 11.0   < > 13.4*   < > 6.4 7.9 8.5   ANEU 8.8* 7.2  --   --   --   --   --  10.7*  --  4.2 4.4 4.9   AEOS 0.0 0.0  --   --   --   --   --  0.0  --  0.0 0.2 0.2   HGB 16.7 15.8  15.5 17.2 16.9 16.2   < > 10.1*   < > 17.1 17.9* 17.7   MCV 97 97 95.8 98 98 98   < > 96   < > 97 97 99    251 285 320 320 306   < > 601*   < > 165 210 211    < > = values in this interval not displayed.       Metabolic Studies     Recent Labs   Lab Test 12/04/20  0540 12/03/20  2348 12/03/20  1406 12/03/20  1108 09/03/20 07/24/20  1051    135  --  137 136 131*   POTASSIUM 4.1 4.3 4.0 3.6 4.0 4.0   CHLORIDE 110* 108  --  108 100 97   CO2 18* 19*  --  20 24 28   BUN 11 12  --  11 15 16   CR 0.97 1.07  --  1.18 1.16 1.45*   GFRESTIMATED 82 73  --  65 66 50*       Hepatic Studies    Recent Labs   Lab Test 12/03/20  1108 09/03/20 07/24/20  1051 07/02/20  1155 03/04/20  1157 12/06/19  1211   BILITOTAL 1.2 0.7 1.0 0.8 1.0 1.0   ALKPHOS 124 84 104 98 100 84   ALBUMIN 2.6* 4.1 4.1 4.0 4.0 4.1   AST 39 28 47* 34 38 24   ALT 27 23 51 46 53 28       Microbiology:  Culture Micro   Date Value Ref Range Status   12/03/2020 No growth after 17 hours  Preliminary   12/03/2020 No growth after 17 hours  Preliminary   12/03/2020 Culture in progress  Preliminary   12/13/2018 No growth  Final   12/09/2018 No growth  Final   12/07/2018 (A)  Final    >10 Squamous epithelial cells/low power field indicates oral contamination. Please   recollect.     12/07/2018 Canceled, Test credited  Final   12/07/2018   Final    Notification of test cancellation was given to  Sigifredo Peck RN, @2233 12/07/18..     12/07/2018 No growth  Final   12/07/2018 No growth  Final   10/23/2018 No growth  Final   04/07/2017 No growth  Final       Urine Studies    Recent Labs   Lab Test 03/04/20  1400 04/26/19  1437 12/07/18  0801 11/29/18  1310 10/23/18  1810   LEUKEST Negative Negative Trace* Negative Negative   WBCU 1 1 2 2 0       Vancomycin Levels  No lab results found.    Invalid input(s): VANCO    Hepatitis B Testing   Recent Labs   Lab Test 10/24/18  0634   HBCAB Nonreactive   HEPBANG Nonreactive     Hepatitis C Testing     Hepatitis C Antibody    Date Value Ref Range Status   07/24/2020 Nonreactive NR^Nonreactive Final     Comment:     Assay performance characteristics have not been established for newborns,   infants, and children     12/06/2019 Nonreactive NR^Nonreactive Final     Comment:     Assay performance characteristics have not been established for newborns,   infants, and children       Respiratory Virus Testing    No results found for: RS, FLUAG

## 2020-12-04 NOTE — PLAN OF CARE
PT: cancel- pt not medically appropriate for PT eval, with elevated troponins and INR. Will reschedule.

## 2020-12-04 NOTE — PROGRESS NOTES
D: Called patient for routine virtual VAD Coordinator rounding (r/t COVID-19). No VAD related questions or concerns at this time.  I: Discussed POC and provided support and listened to patient and care giver's thoughts and concerns.  P: Continue to follow patient and address any questions or concerns patient and or caregiver may have.

## 2020-12-04 NOTE — CONSULTS
Cardiovascular and Thoracic Surgery Consultation    Danielito Chu MRN# [id number]   YOB: 1955 Age: 65 year old   Date of Admission: 12/3/2020     Reason for consult: I was asked by Internal Medicine to evaluate this patient for possible driveline infection and LVAD outflow thrombus.           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     - CVTS will discuss further in AM  - NPO while discussing whether surgery is needed  - Continue antibiotics per primary team  - Wound cultures, blood cultures pending  - Anticoagulation for LVAD     Attestation:  Discussed with fellow, Dr. Moore, who will discuss with staff           Chief Complaint:   Fluid from driveline site          Past Medical History:     Past Medical History:   Diagnosis Date     Acute systolic congestive heart failure (H) 4/5/2017     Diabetes (H)      HTN (hypertension)      Hyperlipidemia      Liver disease      LVAD (left ventricular assist device) present (H)      3 12/18     Marijuana abuse      Mitral regurgitation      Nocturnal oxygen desaturation 3/29/2018     Nonischemic cardiomyopathy (H) 4/5/2017     SHIRLEY (obstructive sleep apnea)      Pacemaker 04/07/2017    ICD 4/7/17     Situational mixed anxiety and depressive disorder              Past Surgical History:     Past Surgical History:   Procedure Laterality Date     CV RIGHT HEART CATH N/A 5/9/2019    Procedure: CV RIGHT HEART CATH;  Surgeon: Jules Allan MD;  Location:  HEART CARDIAC CATH LAB     CV RIGHT HEART CATH N/A 7/2/2020    Procedure: CV RIGHT HEART CATH;  Surgeon: Jeremy Mckeon MD;  [facility]     ESOPHAGOSCOPY, GASTROSCOPY, DUODENOSCOPY  (EGD), COMBINED N/A 2018    Procedure: COMBINED ESOPHAGOSCOPY, GASTROSCOPY, DUODENOSCOPY (EGD);  Surgeon: Candido Mendez MD;  Location:  GI     IMPLANT IMPLANTABLE CARDIOVERTER DEFIBRILLATOR       INSERT VENTRICULAR ASSIST DEVICE LEFT (HEARTMATE II/III) MINIMALLY INVASIVE N/A 2018    Procedure: Partial Median Sternotomy, Left Thoracotomy, Minimally Invasive Left Ventricular Assist Device Placement (Heartmate III), On Cardiopulmonary Bypass;  Surgeon: Andrei Silva MD;  Location:  OR               Social History:     Social History     Tobacco Use     Smoking status: Former Smoker     Packs/day: 0.30     Years: 20.00     Pack years: 6.00     Types: Cigarettes     Smokeless tobacco: Never Used     Tobacco comment: quit 3/2017   Substance Use Topics     Alcohol use: No     Frequency: Never     Comment: rare             Family History:     Family History   Problem Relation Age of Onset     Heart Failure Father          at age 86     Heart Failure Paternal Uncle          at 66      Myocardial Infarction Paternal Uncle          at age 62     Coronary Artery Disease Mother          during CABG at age 41             Immunizations:     Immunization History   Administered Date(s) Administered     DTaP, Unspecified 2020     HepB 2020     Influenza (High Dose) 3 valent vaccine 10/19/2017, 10/16/2018     Pneumo Conj 13-V (2010&after) 2020     Pneumococcal 23 valent 2017     Zoster vaccine recombinant adjuvanted (SHINGRIX) 2020             Allergies:   No Known Allergies          Medications:     Medications Prior to Admission   Medication Sig Dispense Refill Last Dose     allopurinol (ZYLOPRIM) 100 MG tablet Take 2 tablets (200 mg) by mouth daily 180 tablet 1      amiodarone (PACERONE) 200 MG tablet Take 1 tablet (200 mg) by mouth daily 90 tablet 3      amoxicillin (AMOXIL) 500 MG capsule Take 4 capsules (2,000 mg) by mouth once as needed (Take 4  capsules (2000mg), one hour before any dental cleanings or procedures) 4 capsule 0      aspirin (ASA) 81 MG chewable tablet Take 1 tablet (81 mg) by mouth daily 120 tablet 0      Blood Glucose Monitoring Suppl (BLOOD GLUCOSE MONITOR SYSTEM) w/Device KIT three times a week        bumetanide (BUMEX) 1 MG tablet Take 2 tablets (2 mg) by mouth 2 times daily 180 tablet 3      colchicine (COLCRYS) 0.6 MG tablet One tab 0.6 mg on Mon/Wed/Fri x 1 month and if no gout flare up, stop it 15 tablet 1      enoxaparin (LOVENOX) 100 MG/ML syringe Inject 100 mg (1 ml) every 12 hours as directed by the Anticoagulation Clinic. (Patient not taking: Reported on 7/16/2020) 10 Syringe 1      losartan (COZAAR) 25 MG tablet Take 1 tablet (25 mg) by mouth daily 90 tablet 3      metoprolol succinate ER (TOPROL XL) 25 MG 24 hr tablet Take 1.5 tablets (37.5 mg) by mouth daily 90 tablet 3      potassium chloride ER (KLOR-CON) 10 MEQ CR tablet Take 2 tablets (20 mEq) by mouth 2 times daily 360 tablet 3      spironolactone (ALDACTONE) 25 MG tablet TAKE 1.5 TABLETS (37.5 MG) BY MOUTH DAILY 135 tablet 3      tacrolimus (PROTOPIC) 0.1 % external ointment Apply topically 2 times daily (Patient not taking: Reported on 7/16/2020) 30 g 3      traZODone (DESYREL) 100 MG tablet TAKE 1 TABLET BY MOUTH EVERY DAY AT BEDTIME AS NEEDED FOR SLEEP (Patient not taking: Reported on 7/16/2020) 30 tablet 1      vitamin B1 (THIAMINE) 100 MG tablet Take 1 tablet (100 mg) by mouth daily (Patient not taking: Reported on 7/16/2020) 30 tablet 0      warfarin ANTICOAGULANT (COUMADIN) 2 MG tablet TAKE 1 & 1/2 TABLET BY MOUTH EACH DAY AT 6PM 135 tablet 3              Review of Systems:   The Review of Systems is negative other than noted in the HPI         Physical Exam:     Patient Vitals for the past 8 hrs:   BP Temp Temp src Pulse Resp SpO2 Weight   12/04/20 0000 (!) 84/74 97.8  F (36.6  C) Oral 127 24 93 % 103.2 kg (227 lb 8.2 oz)   12/03/20 1942 108/71 98  F (36.7  C)  Oral 138 28 93 % --   12/03/20 1800 100/57 -- -- 82 12 97 % --   12/03/20 1700 -- -- -- 137 30 93 % --     I/O last 3 completed shifts:  In: 840 [P.O.:840]  Out: 450 [Urine:450]  Constitutional:   Lying in bed, drowsy, NAD     Eyes:   Lids and lashes normal, pupils equal, round and reactive to light, extra ocular muscles intact, sclera clear, conjunctiva normal     Neck:   Supple, symmetrical, trachea midline, no adenopathy, thyroid symmetric, not enlarged and no tenderness, skin normal     Back:   Symmetric, no curvature, spinous processes are non-tender on palpation, paraspinous muscles are non-tender on palpation, no costal vertebral tenderness     Lungs:   Mild respiratory distress     Cardiovascular:   LVAD in place     Abdomen:   soft, non-distended, non-tender and driveline site with no cellulitis and some non bloody drainage     Musculoskeletal:   There is no redness, warmth, or swelling of the joints.  Full range of motion noted.  Motor strength is 5 out of 5 all extremities bilaterally.  Tone is normal.     Neurologic:   Drowsy, CN grossly intact             Data:   All laboratory data reviewed  Lab Results   Component Value Date    WBC 9.7 12/03/2020    HGB 15.8 12/03/2020    HCT 46.5 12/03/2020     12/03/2020     12/03/2020    POTASSIUM 4.3 12/03/2020    CHLORIDE 108 12/03/2020    CO2 19 (L) 12/03/2020    BUN 12 12/03/2020    CR 1.07 12/03/2020     (H) 12/03/2020    DD 1.3 (H) 12/03/2020    NTBNPI 1,970 (H) 10/27/2018    NTBNP 2,244 (H) 12/20/2018    AST 39 12/03/2020    ALT 27 12/03/2020    ALKPHOS 124 12/03/2020    BILITOTAL 1.2 12/03/2020    INR 4.25 (H) 12/03/2020     CT scan of the abdomen:   Impression:   1. Filling defect of the LVAD outflow cannula concerning for thrombus.  2. Hyperattenuation of the soft tissues adjacent to the left upper  abdominal quadrant drive line without focal fluid collection. Findings  can be seen in setting of inflammation or infection  3. Patchy  groundglass opacities in the lung bases consistent with  known COVID-19 infection.  4. Nodular margin of the liver capsule which may represent cirrhosis  although evaluation is limited by motion artifact.  5. Compression deformity of T7 with approximately 30 percent loss of  vertebral body height.    CT scan interpreted by radiologist

## 2020-12-04 NOTE — CONSULTS
[facility] CARDIOTHORACIC SURGERY CONSULT  Patient Name: Danielito Chu  Medical Record Number: [id number]  YOB: 1955  Room Number: 6238/6238-02  Referring Physician: Dr. Mondragon    CC: Outflow graft obstruction, COVID 19    History of Present Illness: Danielito Chu is a 65 year old male admitted with respiratory difficulty and positive for COVID19.  He had CT chest that showed incidental finding of outflow graft obstruction.  Appears to be external compression of outflow graft from the GoreTex graft.    Assessment and Plan:  Danielito Chu is a 65 year old male with COVID 19, incidentally identified outflow graft obstruction  1) Recommend expectant management for outflow graft obstruction  2) Will discuss with HF team priority for potential transplant after clear of COVID 19   3) Please call with questions or concerns.     Thank you for the opportunity to participate in the care of this patient.    Andrei Silva MD  Cardiothoracic Surgery  [phone]    Past Medical History:  Past Medical History:   Diagnosis Date     Acute systolic congestive heart failure (H) 4/5/2017     Diabetes (H)      HTN (hypertension)      Hyperlipidemia      Liver disease      LVAD (left ventricular assist device) present (H)      3 12/18     Marijuana abuse      Mitral regurgitation      Nocturnal oxygen desaturation 3/29/2018     Nonischemic cardiomyopathy (H) 4/5/2017     SHIRLEY (obstructive sleep apnea)      Pacemaker 04/07/2017    ICD 4/7/17     Situational mixed anxiety and depressive disorder        Past Surgical History:  Past Surgical History:   Procedure Laterality Date     CV RIGHT HEART CATH N/A 5/9/2019    Procedure: CV RIGHT HEART CATH;  Surgeon: Jules Allan MD;  Location:  HEART CARDIAC CATH LAB     CV RIGHT HEART CATH N/A 7/2/2020    Procedure: CV RIGHT HEART CATH;  Surgeon: Jeremy Mckeon MD;  Location: [facility]     ESOPHAGOSCOPY, GASTROSCOPY, DUODENOSCOPY  (EGD), COMBINED N/A 2018    Procedure: COMBINED ESOPHAGOSCOPY, GASTROSCOPY, DUODENOSCOPY (EGD);  Surgeon: Candido Mendez MD;  Location:  GI     IMPLANT IMPLANTABLE CARDIOVERTER DEFIBRILLATOR       INSERT VENTRICULAR ASSIST DEVICE LEFT (HEARTMATE II/III) MINIMALLY INVASIVE N/A 2018    Procedure: Partial Median Sternotomy, Left Thoracotomy, Minimally Invasive Left Ventricular Assist Device Placement (Heartmate III), On Cardiopulmonary Bypass;  Surgeon: Andrei Silva MD;  Location:  OR        Family History:   Family History   Problem Relation Age of Onset     Heart Failure Father          at age 86     Heart Failure Paternal Uncle          at 66      Myocardial Infarction Paternal Uncle          at age 62     Coronary Artery Disease Mother          during CABG at age 41       Social History:  Social History     Socioeconomic History     Marital status: Single     Spouse name: Not on file     Number of children: Not on file     Years of education: Not on file     Highest education level: Not on file   Occupational History     Not on file   Social Needs     Financial resource strain: Not on file     Food insecurity     Worry: Not on file     Inability: Not on file     Transportation needs     Medical: Not on file     Non-medical: Not on file   Tobacco Use     Smoking status: Former Smoker     Packs/day: 0.30     Years: 20.00     Pack years: 6.00     Types: Cigarettes     Smokeless tobacco: Never Used     Tobacco comment: quit 3/2017   Substance and Sexual Activity     Alcohol use: No     Frequency: Never     Comment: rare     Drug use: No     Sexual activity: Not on file   Lifestyle     Physical activity     Days per week: Not on file     Minutes per session: Not on file     Stress: Not on file   Relationships     Social connections     Talks on phone: Not on file     Gets together: Not on file     Attends Christian service: Not on file     Active member of club or  organization: Not on file     Attends meetings of clubs or organizations: Not on file     Relationship status: Not on file     Intimate partner violence     Fear of current or ex partner: Not on file     Emotionally abused: Not on file     Physically abused: Not on file     Forced sexual activity: Not on file   Other Topics Concern     Parent/sibling w/ CABG, MI or angioplasty before 65F 55M? Not Asked   Social History Narrative     Not on file       Allergies:   No Known Allergies    Medications:  Current Facility-Administered Medications   Medication     0.9% sodium chloride BOLUS     allopurinol (ZYLOPRIM) tablet 200 mg     amiodarone (NEXTERONE) 250 mg in D5W 250 mL infusion     amiodarone (NEXTERONE) 250 mg in D5W 250 mL infusion     amiodarone (NEXTERONE) bolus 150 mg     amiodarone (PACERONE) tablet 200 mg     aspirin (ASA) chewable tablet 81 mg     cefTRIAXone (ROCEPHIN) 1 g vial to attach to  mL bag for ADULTS or NS 50 mL bag for PEDS     dexamethasone (DECADRON) tablet 6 mg     glucose gel 15-30 g    Or     dextrose 50 % injection 25-50 mL    Or     glucagon injection 1 mg     doxycycline hyclate (VIBRAMYCIN) capsule 100 mg     Lidocaine (LIDOCARE) 4 % Patch 1 patch    And     lidocaine patch in PLACE     [Held by provider] losartan (COZAAR) tablet 25 mg     menthol (Topical Analgesic) 2.5% (BENGAY VANISHIN SCENT) 2.5 % topical gel     metoprolol succinate (TOPROL-XL) half-tab 37.5 mg     pantoprazole (PROTONIX) EC tablet 20 mg     polyethylene glycol (MIRALAX) Packet 17 g     polyethylene glycol (MIRALAX) Packet 17 g     remdesivir 100 mg in sodium chloride 0.9 % 250 mL intermittent infusion     senna-docusate (SENOKOT-S/PERICOLACE) 8.6-50 MG per tablet 1 tablet    Or     senna-docusate (SENOKOT-S/PERICOLACE) 8.6-50 MG per tablet 2 tablet     senna-docusate (SENOKOT-S/PERICOLACE) 8.6-50 MG per tablet 1 tablet     sodium chloride 0.9% infusion     [START ON 12/5/2020] spironolactone (ALDACTONE)  half-tab 37.5 mg     thiamine (B-1) tablet 100 mg     Warfarin Therapy Reminder (Check START DATE - warfarin may be starting in the FUTURE)     warfarin-No DOSE today       Review of Systems:   A 10 point ROS was performed and is negative other than HPI.    Physical Exam:  Temp:  [96.8  F (36  C)-99  F (37.2  C)] 97.7  F (36.5  C)  Pulse:  [] 138  Resp:  [11-45] 40  BP: ()/(35-81) 113/77  SpO2:  [90 %-97 %] 97 %    Gen: NAD, resting comfortably in chair   Lungs: CTAB, non-labored breathing   CV: regular rhythm, normal rate   Abd: Soft, not tender, not distended   Ext: Motor, sensation, pulses intact   Neuro: AOx3    Labs:  ABG   Recent Labs   Lab 12/03/20  1100   PH 7.43   PCO2 26*   PO2 79*   HCO3 17*     CBC  Recent Labs   Lab 12/04/20  0540 12/03/20  1108   WBC 10.6 9.7   HGB 16.7 15.8    251     BMP  Recent Labs   Lab 12/04/20  0540 12/03/20  2348 12/03/20  1406 12/03/20  1108    135  --  137   POTASSIUM 4.1 4.3 4.0 3.6   CHLORIDE 110* 108  --  108   CO2 18* 19*  --  20   BUN 11 12  --  11   CR 0.97 1.07  --  1.18   * 159*  --  110*     LFT  Recent Labs   Lab 12/04/20  0540 12/03/20  1108   AST  --  39   ALT  --  27   ALKPHOS  --  124   BILITOTAL  --  1.2   ALBUMIN  --  2.6*   INR 5.32* 4.25*     PancreasNo lab results found in last 7 days.    Imaging:  CT Chest (12/4/2020):  Study Result    Exam: Computed tomographic angiography of the chest without and with  contrast including 3D reformations dated 12/4/2020 1:43 AM     Clinical information: LVAD OUTFLOW GRAFT THROMBUS? Please include  upper part of the abdomen     Technique: Helical scans through the chest obtained before the  administration of intravenous contrast media and following the  injection of contrast media in the arterial phase. Source images  reviewed as well as 3D and multi-planar reconstructions.     Contrast: 100 ml isovue 370      DLP: 709 mGy*cm     Comparison: CT abdomen and pelvis 12/3/2020 and CT chest  12/8/2018     Findings:  There is redemonstration of nonocclusive thrombus extending throughout  the entirety of the LVAD outflow cannula with almost 50% stenosis  approximately 2.6 cm from the aortic anastomosis. This does not appear  significantly changed since CT performed 4 hours earlier on 12/3/2020  at 20:36 hours.     Thoracic aortic diameters:  No thoracic aortic aneurysm.  Sinuses of Valsalva: 3.3 cm.   Ascending aorta: 2.9 cm.   Aortic arch: 2.9 cm.   Proximal thoracic aorta: 2.8 cm.   Mid thoracic aorta: 2.7 cm.   Descending thoracic aorta: 2.5 cm.      Patent Bovine arch. Origins of the brachiocephalic artery, left common  carotid artery and left subclavian artery show no focal abnormality.  The proximal pulmonary vasculature  appears normal. The celiac axis  and superior mesenteric arteries are patent. Small linear filling  defect within the proximal celiac artery (series 12 image 517 and  series 13 image 96) which extends to the proximal common hepatic  artery. The bilateral renal arteries are patent. Small amount of mural  thrombus within the infrarenal abdominal aorta (series 12 image 678)  is not significantly changed. The splenic vein, portal vein, SMV and  renal veins are patent. IVC is patent.     Chest:   Mild layering debris within the trachea, otherwise the central  tracheobronchial tree is patent. Diffuse patchy groundglass opacities  and interlobular septal thickening, with mild sparing of the right  middle lobe. Trace bilateral pleural effusions. LVAD device with drive  line exits percutaneously through the the left upper abdominal  quadrant. Hyperattenuation of the soft tissues adjacent to the drive  line without focal fluid collection. Left chest wall implantable  cardiac defibrillator. Cardiomegaly. Normal caliber main pulmonary  artery. Scattered prominent mediastinal lymph nodes.     Upper abdomen:  Liver surface irregular in contour. Mild fatty atrophy of  the  pancreas.     Bones:  Multilevel degenerative changes of the spine. Stable compression  deformity of T7. Age indeterminate compression deformity of T4 with  approximately 40% vertebral body height loss, new since CT on  12/8/2018.                                                                      Impression:  1. Redemonstration of nonocclusive thrombus extending throughout the  entirety of the LVAD outflow cannula with almost 50% stenosis  approximately 2.6 cm from the aortic anastomosis.  2. Small amount of nonocclusive thrombus within the proximal celiac  artery.  3. Diffuse patchy groundglass opacities and interlobular septal  thickening consistent with known COVID-19 pneumonia.  4. Stable hazy soft tissue hyperattenuation in the left upper quadrant  near the LVAD drive line without focal fluid collection.   5. Compression deformity of T4 and T7 with approximately 30% height  loss, both new since 12/8/2018.        Imaging findings discussed with Dr. Mondragon by Dr. Trev Thomas at  2:45AM on 12/4/2020.     I have personally reviewed the examination and initial interpretation  and I agree with the findings.     CHRISTI THOMPSON MD

## 2020-12-04 NOTE — PROGRESS NOTES
[facility]   Hospitalist Progress Note  Jack Morrissey MD  12/04/2020    Assessment & Plan   Danielito Chu is a 65 year old male with HM3 LVAD (12/2018) bridge to heart transplant, NICM, (LVEF at dong of 15%) s/p ICD (4/2017), DM2, CKD II, gout, GERD who was admitted to [facility]  on 12/3/2020 for evaluation of  fever, dyspnea and weakness and loss of appetite and transfer from [facility] for ARHF 2/2 COVID pneumonia and possible LVAD drive line infection.     COVID-19 Diagnosis Details:  Onset of COVID symptoms ~11/28   Date of positive test results 11/30/20 and on 12/3/20   Research study Approved for [employer] clinical trial for COVID convalescent plasma (see 12/3 note Dr. Byrd)      # Confirmed COVID-19 infection    # Acute Hypoxic Respiratory Failure secondary to COVID-19 infection  - Initial chest x-ray 12/3 showed positive findings of worsening bilateral patchy opacities. Oxygen needs have been somewhat labile and was needing up to 8L, currently . Vitals concerning for MAPs in the 70 and HR in the 130-160s, dong HR in the 110s, got metop and amio in mICU   - Admit to medical floor with continuous pulse oximetry, COVID-19 special precautions, minimized lab draws, and care consolidation. Labs significant for , , WBC 9.7.  - Oxygen: continue current support with Venturi face mask at 6 L/min; titrate to keep SpO2 between 90-96%  - Labs: CBC with differential, CMP, BNP or NT-proBNP, reticulocyte count, PT/INR, aPTT, fibrinogen, antithrombin, D-dimer, CPK, ferritin and LDH done on admission and reviewed by me. Will trend daily until patient reaches clinical stability.  - Imaging: CT chest (is to assess LVAD in this case)  - Breathing treatments: no inhalers needed; avoid nebulizers in favor of MDIs   - IV fluids: currently NPO, some IVF   - Antibiotics: - Ceftriaxone + doxy x5 day empiric course  "(12/3-12/7)  - Discontinued azithro after 1 dose as starting on doxy, procal 0.19  - Treatments: Dexamethasone 6 mg x 10 days or hospital discharge, started on 12/3 and Remdesivir x 5 days or hospital discharge, started on 12/3, start plasma convalescent therapy.  - Research study protocol: pending review  - DVT Prophylaxis: At high risk of thrombotic complications due to COVID-19 disease (last DDimer 1.3).          - Already on therapeutic anticoagulation with warfarin  - Discharge Anticoagulation: At discharge consider one of the following for 30 days and until the patient has returned to normal mobility:        - Apixaban (Eliquis) 2.5 mg two times a day        - Rivaroxaban (Xarelto) 10 mg once daily             D Dimer   Date Value Ref Range Status   12/03/2020 1.3 (H) 0.0 - 0.50 ug/ml FEU Final       Comment:       This D-dimer assay is intended for use in conjunction with a clinical pretest   probability assessment model to exclude pulmonary embolism (PE) and deep   venous thrombosis (DVT) in outpatients suspected of PE or DVT. The cut-off   value is 0.5 ug/mL FEU.            ACTIVE HOSPITAL PROBLEMS:     LVAD (Heartmate 3) on Coumadin anticoagulation  Hx NICMP (LVEF 15%)  Drainage around LVAD drive line  Chronic systolic heart failure secondary to NICM s/p 3 LVAD as BTT on 12/5/2018, listed as status 4  s/p single chamber ICD (4/2017)  US 12/3/20 shows \"some heterogeneous material superficially just below the skin surface along the driveline catheter entrance measuring approximately 0.9 x 2.2 x 0.7 cm (craniocaudad by transverse by AP). There is also a heterogeneous hypoechoic/anechoic area along the drive line just  inferior to this measuring approximately 3.8 x 2.3 x 1.2 cm (craniocaudad by transverse by AP\". Stage D. NYHA Class II. Dry wt ~220 lbs, 224 upon admission. Appears euvolemic to slightly hypervolemic on exam. MAP gaol 65-85.  - Cardiology HF input is appreciated   - rate contol: amiodarone 200mg " daily, metoprolol succ 37.5mg daily but patient likely in SVT, defer rate to cardiology.  -  Currently holding PTA bumex 2 mg BID,  Losartan 25 mg daily, follow up volume status and with cards in a.m. , may be able to resume  - continue spironolactone 37.5mg daily  - INR goal 2-3 (4.25 on admission), holding coumadin until decision for surgery is made.  - COVID tx as per above  - doxy per cards as per above 12/3-12/8  - follow up culture and sensitivity drain fluid 12/3/20 still negative   - Driveline doppler results above, c/w fluid collections at driveline site but see CT scan results below  - FEN: cardiac diet, trend I&O and weights  - please continue to document LVAD settings   - CVTS surgery consult appreciated, NPO while deciding on if surgery in immediately indicated.  - ID consult Re driveline infection Abx recommendations, currently on ceftriaxone and doxycycline, procalcitonin 0.19  - CT abdomen to assess driveline site infection shows:  1. Filling defect of the LVAD outflow cannula concerning for thrombus.  2. Hyperattenuation of the soft tissues adjacent to the left upper  abdominal quadrant drive line without focal fluid collection. Findings  can be seen in setting of inflammation or infection  3. Patchy groundglass opacities in the lung bases consistent with  known COVID-19 infection.  - patient high risk for surgery with current and active covid infection.     Hx NSVT  In SVT upon arrival to 150s, sustained x15 minutes. Did not get anti-arrhythmics this AM at OSH. Per discussion with cards and given LVAD status, would assess patient per LVAD settings and clinical picture and not purely with vitals. MAPs remain in the 70s   - continue telemetry  - IV metoprolol 5 mg x 1 given in mICU with reduction in rates to <120  - continue PTA metoprolol secc 37.5mg and amiodarone 200mg daily  - HR in the 130's, reload with amiodarone? Will defer to cardiology.     Chronic, stable Medical Problems:     Anxiety,  acute stress  Patient notes working with a therapist outpatient with respect to his LVAD/cardiac history and notes that primarily he is feeling quite anxious and is agreeable to meeting with psychiatry to discuss  - approach treatments with caution and would d/w cardiolgy  - psychiatry consult appreciated, declined medications, wants supportive care, psychology consulted  - offer emotional support         CKD II  Baseline ~1.1-1.2, GFR. 1.18 upon admission, GFR 65 - stable  - CTM  - K/Mg repletion protocols - K>4, Mg>2  - Phos regular repletion protocol  - avoid nephrotoxins where possible      GERD  -continue PTA protonix 20mg daily     DM  No PTA meds. A1C 5.9 (12/3)  in the setting of acute illness (COVID) and steroid mng.  Glucose in the 110-120s.  - trend glucose, currently controlled  - consider start sliding scale insulin in glucose uptrending when taking po     Chronic mid and upper back pain  Has followed with an MD in [place] and reports being prescribed a steroid and narcotic for his pain (oxycodone?) and has a 4/10 pain in the mid and upper back  - topical bengay to back  - lidoderm patch     Right Otalgia  No otoscope available for examination. External tragus/auricle/cannal/mastoid is nontender including with pulling the auricle and no visible lesions/erythema/discharge  - otoscopic exam when available  - currently on oral Abx as per above, consider ciprodex if appearance of bacterial infection      Hx Gout  No sign of active flare   - continue PTA allopurinol  - hold PTA colchicine     multiple sores  - skin care per nursing, appreciate excellent cares     History of congenital urethral stricture  RUG/ voiding cystourethrogram 8/2019 normal  - bladder scan prn suprapubic pain or no void >6hr while awake        Code Status: Full Code    Goals of Care/ACP:  Danielito expressed these wishes if he is unable to communicate  Health care agent self   Short term intubation yes   Prolonged intubation no  "  Tracheostomy not asked   Nasal feeding tube not asked   Gastric feeding tube not asked   Other wishes None   Diet: Low Saturated Fat Na <2400 mg    Valentine Catheter: not present  Bedside iPad: NOT used due to severity of illness/medical appropriateness      Disposition Plan     Expected discharge: 4 - 7 days      Interval History   -- chart reviewed  -- cardiology, CV surgery, psychiatry notes reviewed  -- patient approved for convalescent plasma treatment   -- ongoing SVT with 's    -Data reviewed today: I reviewed all new labs and imaging over the last 24 hours. I personally reviewed no images or EKG's today.    Physical Exam    , Blood pressure 113/77, pulse 138, temperature 97.7  F (36.5  C), temperature source Axillary, resp. rate 26, height 1.778 m (5' 10\"), weight 103.2 kg (227 lb 8.2 oz), SpO2 97 %.  Vitals:    12/03/20 0930 12/04/20 0000   Weight: 101.7 kg (224 lb 3.3 oz) 103.2 kg (227 lb 8.2 oz)     Vital Signs with Ranges  Temp:  [96.8  F (36  C)-99  F (37.2  C)] 97.7  F (36.5  C)  Pulse:  [] 138  Resp:  [11-45] 26  BP: ()/(35-81) 113/77  SpO2:  [90 %-97 %] 97 %  I/O's Last 24 hours  I/O last 3 completed shifts:  In: 1260 [P.O.:1260]  Out: 1150 [Urine:1150]    Constitutional: Awake, on FM, audible wheezing, some increased work of breathing, SVT  Respiratory: Some diffuse crackles noted in lung  Cardiovascular: tachycardia 140's likely aflutter  GI: Normal bowel sounds, soft, non-distended, non-tender, LVAD driveline site with dressing  Skin/Integumen: No rashes, no cyanosis, no edema  Other:      Medications   All medications were reviewed.    Warfarin Therapy Reminder         allopurinol  200 mg Oral Daily     amiodarone  200 mg Oral Daily     aspirin  81 mg Oral Daily     cefTRIAXone  1 g Intravenous Q24H     dexamethasone  6 mg Oral Q24H     doxycycline hyclate  100 mg Oral Q12H VIATLY     lidocaine  1 patch Transdermal Q24h    And     lidocaine   Transdermal Q8H     [Held by provider] " losartan  25 mg Oral Daily     metoprolol succinate ER  37.5 mg Oral Daily     pantoprazole  20 mg Oral QAM AC     polyethylene glycol  17 g Oral Daily     remdesivir  100 mg Intravenous Q24H     senna-docusate  1 tablet Oral At Bedtime     [START ON 12/5/2020] spironolactone  37.5 mg Oral Daily     thiamine  100 mg Oral Daily     warfarin-No DOSE today  1 each Does not apply no dose today (warfarin)        Data   Recent Labs   Lab 12/04/20  0540 12/03/20  2348 12/03/20  1406 12/03/20  1108   WBC 10.6  --   --  9.7   HGB 16.7  --   --  15.8   MCV 97  --   --  97     --   --  251   INR 5.32*  --   --  4.25*    135  --  137   POTASSIUM 4.1 4.3 4.0 3.6   CHLORIDE 110* 108  --  108   CO2 18* 19*  --  20   BUN 11 12  --  11   CR 0.97 1.07  --  1.18   ANIONGAP 11 8  --  8   ASHLEE 9.0 8.6  --  8.2*   * 159*  --  110*   ALBUMIN  --   --   --  2.6*   PROTTOTAL  --   --   --  6.6*   BILITOTAL  --   --   --  1.2   ALKPHOS  --   --   --  124   ALT  --   --   --  27   AST  --   --   --  39   TROPI 0.475*  --   --   --        Recent Results (from the past 24 hour(s))   US Abdomen Limited    Narrative    EXAMINATION: US ABDOMEN LIMITED, 12/3/2020 3:07 PM     COMPARISON: None.    HISTORY: LVAD with drainage around drive line. Please assess for fluid  collection. Swelling.    TECHNIQUE: Limited grayscale and color imaging of the region of  interest in the left abdominal wall along the patient's LVAD drive  line was performed.    FINDINGS/    Impression    IMPRESSION:  There is some heterogeneous material superficially just below the skin  surface along the driveline catheter entrance measuring approximately  0.9 x 2.2 x 0.7 cm (craniocaudad by transverse by AP). There is also a  heterogeneous hypoechoic/anechoic area along the drive line just  inferior to this measuring approximately 3.8 x 2.3 x 1.2 cm  (craniocaudad by transverse by AP). These collections are best  appreciated on the cine images. Material in these  regions appears  somewhat heterogeneous, especially in the superficial region and  presumably is infected given the provided history.     NASEEM ESCOBEDO MD   CT Abdomen Pelvis w Contrast   Result Value    Radiologist flags (Urgent)     Filling defect of the LVAD outflow cannula concerning    Narrative    CT of the Abdomen and Pelvis without contrast, 12/3/2020 8:38 PM.    Comparison: CT 10/20/2018.    History: assess LVAD infection site (may need cuts of chest as well).     Technique: Axial images of the  abdomen and pelvis were obtained  without contrast. Coronal reconstructions were provided. Images were  reviewed in bone, lung, and soft tissue windows.    Total DLP: 1722 mGy*cm.    Findings:  Exam and is partially degraded by motion artifact.    Chest: The visualized esophagus appears unremarkable. Patchy  groundglass opacities of the lung bases. Trace bilateral pleural  effusions. LVAD placement. Cardiomegaly. Partial visualization LVAD..  Filling defect of the outflow cannula. LVAD drive line exits  percutaneously through the the left upper abdominal quadrant.  Hyperattenuation of the soft tissues adjacent to the drive line  without focal fluid collection.    Abdomen and Pelvis: Nodular margin of the liver capsule evaluation is  partially limited by motion artifact. No opaque gallbladder calculi.  No intrahepatic or extrahepatic biliary dilatation.  Pancreas  unremarkable. Spleen size within normal limits. No suspicious adrenal  mass lesions. No evidence of hydronephrosis. Visualized ureters and  urinary bladder is unremarkable.  Bilateral fat-containing hernias. No  free fluid. Diverticulosis without evidence of diverticulitis. Normal  caliber large and small bowel.   Atheromatous calcifications of the  aortoiliac vasculature. No suspicious or enlarged mesenteric,  retroperitoneal and pelvic lymph nodes.     Bones and Soft Tissues: Degenerative change of the spine. Compression  fracture of T7 with  approximately 30% loss of vertebral body height.  Bilateral gynecomastia.      Impression    Impression:   1. Filling defect of the LVAD outflow cannula concerning for thrombus.  2. Hyperattenuation of the soft tissues adjacent to the left upper  abdominal quadrant drive line without focal fluid collection. Findings  can be seen in setting of inflammation or infection  3. Patchy groundglass opacities in the lung bases consistent with  known COVID-19 infection.  4. Nodular margin of the liver capsule which may represent cirrhosis  although evaluation is limited by motion artifact.  5. Compression deformity of T7 with approximately 30 percent loss of  vertebral body height.    [Urgent Result: Filling defect of the LVAD outflow cannula concerning  for thrombus.]    Finding was identified on 12/3/2020 8:48 PM.     Provider Audrey was contacted by Dr. Sanders at 12/3/2020 9:14 PM and  verbalized understanding of the urgent finding.      CTA Chest with Contrast    Impression    Impression:  1. Redemonstration of thrombus extending throughout the entirety of  the LVAD outflow cannula with almost 50% stenosis approximately 2.6 cm  from the aortic anastomosis.  2. Small amount of nonocclusive thrombus within the proximal celiac  artery.  3. Diffuse patchy groundglass opacities and interlobular septal  thickening consistent with known COVID-19 pneumonia.  4. Stable hazy soft tissue hyperattenuation in the left upper quadrant  near the LVAD drive line without focal fluid collection. Findings  can be seen in setting of inflammation or infection.  5. Stable compression deformity of T7. Age indeterminate compression  deformity of T4 with approximately 40% vertebral body height loss, new  since 12/8/2018.  6. Cirrhotic appearance of the liver without focal mass.    Imaging findings discussed with Dr. Mondragon by Dr. Trev Thomas at  2:45AM on 12/4/2020.           Jack Morrissey MD  Text Page  (7am to 6pm)

## 2020-12-04 NOTE — PLAN OF CARE
"Shift: 0219-9443  VS: Tachy up to 130s at rest, 150s w/ activity. Rrs 25-30 overnight w/ increasing O2 needs. Bps labile but MAPS 70s-80s. BP (!) 88/74 (BP Location: Right arm)   Pulse 135   Temp 97.8  F (36.6  C) (Axillary)   Resp 30   Ht 1.778 m (5' 10\")   Wt 103.2 kg (227 lb 8.2 oz)   SpO2 95%   BMI 32.65 kg/m    Pain: chronic back pain, declines prn meds.   Neuro: A&O x4.   Cardiac: Heartmate 3 LVAD w/ no alarms overnight. Dressing changed @0400, small amount of purulent drainage noted at site, culture ordered pending next dressing change.  Tachy - EKG completed x1 overnight d/t HR sustaining >130, unchanged from admission.  Respiratory: tachypneic, O2 needs increasing overnight. Initially on 6L oxymask, now 10L at rest. Requires 15 w/ activity. Pt agreeable to use bipap after activity to help w/ recovery.   GI/Diet/Appetite: NPO since midnight. BM x1.  : Voiding spontaneously, intermittently incontinent.   LDA's: L PIV x2 SL. Heartmate 3 LVAD.   Skin: No new deficits noted.   Activity: Up w/1, extreme shortness of breath w/ exertion.  Tests/Procedures: Chest.abd CT x2.   Pertinent Labs/Lab Collection: Trop 0.475, INR 5.32 this am. Bgs q4h.      Plan: Will continue w/ POC.    "

## 2020-12-04 NOTE — PROGRESS NOTES
Transfer  Transferred from: 4C  Via:bed  Reason for transfer: Pt appropriate for 6B- improved patient condition  Family: Aware of transfer  Belongings: Received with pt  Chart: Received with pt  Medications: Meds received from old unit with pt  Code Status verified on armband: yes  2 RN Skin Assessment Completed By: Michael Peters  Med rec completed: no - passed on to oncoming nurse  Bed surface reassessed with algorithm and charted: yes  New bed surface ordered: no    Report received from: ARAVIND Rees  Pt status: A&Ox4, VSS on 7L oximask.

## 2020-12-04 NOTE — PROGRESS NOTES
Status Change-OS 12/04/20    Changed patient's listing status in UNOS from status 4 to status 7 for the following reasons:  +COVID-19 infection     Patient aware of change (left message) and letter sent per regulatory guidelines, primary cardiology team aware.

## 2020-12-05 NOTE — PROGRESS NOTES
MICU Progress Note    MICU Staff      This patient has been seen and evaluated by me.  I have discussed care with the housestaff and agree with the findings and plan in their note.    Danielito hCu is a 65 year old male who was admitted on 12/4/2020 with  hypoxic respiratory failure from [facility] and remains critically ill with hypoxic respiratory failure, heart failure on LVAD, oliguria, septic shock  I personally examined and evaluated the patient today. The care plan was developed with the house staff team or resident(s) and I agree with the findings and plan in this note aside from any exceptions noted below.  I have reviewed and evaluated the vital signs, medications, laboratory values, imaging studies, and consults from the past 24 hours.  Active problems and key treatments for today include:  Hypoxic respiratory failure , intubated on Veletri. Peep at 14 - will wean down due to rightheart concerns  Heart failure on LVAD, RV dysfunction new. Heart failure cardiology following  Drive line infection. ID and CVTS consulted. No surgical plan as of now. Remains on antibiotics. Cultures sent.  LVAD outflow graft clot. Elevated INR, TEG pending. Anticoagulation plan as per heart failure cardiology. Plan for anticoagulation with full intensity heparin when INR gets to around 2 per Cardiology recommendation.  Oliguria. Diuretic challenge given  Atrial tachycardia. Metoprolol held due to right heart dysfunction    Bruno Johnson MD  Pulmonary/Critical Care  December 5, 2020 3:49 PM    CCT 30 minutes separate from procedures

## 2020-12-05 NOTE — ANESTHESIA PROCEDURE NOTES
Airway   Date/Time: 12/4/2020 6:20 PM   Patient location during procedure: ICU    Staff -   Resident/Fellow: Yani Britt MD  Performed By: resident    Consent for Airway   Urgency: emergentConsent: The procedure was performed in an emergent situation.    Report Obtained from Primary Care Team  History regarding most recent potassium obtained: Yes  History regarding presence/absence of renal failure obtained:Yes  History regarding stroke/CVA obtained:Yes  History regarding presence/absence of NM disorder:Yes    Indications and Patient Condition  Indications for airway management: respiratory insufficiency  Mallampati: Not AssessedInduction type:intravenousMask difficulty assessment: 0 - not attempted    Final Airway Details  Final airway type: endotracheal airway  Successful airway:ETT - single  Endotracheal Airway Details   ETT size (mm): 8.0  Cuffed: yes  Successful intubation technique: video laryngoscopy  Grade View of Cords: 1  Adjucts: stylet  Measured from: gums/teeth  Secured at (cm): 24  Secured with: commercial tube becerril  Bite block used: None    Post intubation assessment   ETT secured, Vent settings by primary/ICU team, Primary/ICU team to review CXR, Sedation to be ordered by primary/ICU team and No apparent complications  Placement verified by: capnometry, equal breath sounds and chest rise   Number of attempts at approach: 1  Number of other approaches attempted: 0  Secured with:commercial tube becerril  Ease of procedure: easy  Dentition: Intact and UnchangedAdditional Comments    The patient was given 20 mg of etomidate for induction and 100 mg of rocuronium for neuromuscular blockade. The patient will be paralyzed for approximately 45 minutes to 1 hour. The primary team was made aware of the patient's paralysis. The bedside nurse will start sedation within the next 5 minutes.

## 2020-12-05 NOTE — PROGRESS NOTES
Major Shift Events:  Low PI on LVAD overnight, 250cc LR bolus given with no response. Stable otherwise, MD's notified and continueing to watch. Norepi titrated to max 0.1. 2gm Bumex given with good response overnight. Sedation lightened this morning but patient is still unresponsive. Pupils sluggish but equal, given resp support sedation not taken off completely. Flolan at full strength. -144 overnight.     Plan: wean fi02 as able, wean pressors as able. Notify MD of changes and concerns.     For vital signs and complete assessments, please see documentation flowsheets.

## 2020-12-05 NOTE — PROGRESS NOTES
[facility]   Cardiology Progress Note    Assessment & Plan   Danielito Chu is a 65 year old male admitted on 12/3/2020 with COVID pneumonia. He has a history of NICM (LVEF at dong of 15%) s/p ICD (4/2017), who underwent HM3 LVAD implant on 12/5/18 as DT, now BTT. He has a history of atrial arrythmia, s/p ICD, on amiodarone and metoprolol. He was admitted as transfer to MICU from [place] ED for ARHF 2/2 COVID pneumonia. 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 He has been transferred out of MICU and is on oxymask for oxygenation. Over the course of the day his respiratory status has progressively deteriorated and he is ultimately transferred back to the MICU and emergently intubated. Low PI's with concurrent low flows suggest suction eventions due to hypovolemia. Recommend keeping relatively volume up, or at least not net negative, with a goal CVP 10-12 mmHg.     Today's Recommendations:  - High intensity hep gtt when INR < 3  - Goal CVP 10-12 mmHg  - VBG with mixed oxyhemoglobin to compute cardiac index      #Covid Pneumonia  #Acute hypoxic respiratory failure  - appreciate cares per primary medical team  - on remdesivir and dexamethasone    #COVID coagulopathy  #Supratherapuetic INR  - hold warfarin  - when INR trends to therapeutic level, would start high-intensity heparin gtt  - trend LDH    #Persistent atrial tachycardia  - stop IV amiodarone  - continue PO amiodarone and metoprolol for atrial tachycardia, no need to aggressively rate control given presence of LVAD     #NICM s/p LVAD 12/5/18  - ok to hold PTA spironolactone and losartan given tenuous hemodynamics  - ok to hold bumex given tenuous hemodynamics    #LVAD outflow thrombus  #LVAD driveline infection  - follow up CT surgery recommendations regarding   - no changes to LVAD parameters today  - f/u microbiology/cultures of driveline exudate  - appreciate ID recommendations  - continue doxycycline  "and ceftriaxone    #Elevated troponin, demand ischemia vs myocarditis  - stat ECHO  - trend troponin    Pt was seen and examined with Dr. Weems, who agrees with the assessment and plan.    Ari King MD  Cardiology Fellow  Pager: [phone]  Amcom: [phone]    Interval History   Low PI's with low flow alarms today.     ROS: A 4-point ROS was otherwise negative except as above.    Data reviewed today: I reviewed all new labs and imaging results over the last 24 hours.       Intake/Output Summary (Last 24 hours) at 12/5/2020 1846  Last data filed at 12/5/2020 1800  Gross per 24 hour   Intake 2539.24 ml   Output 2615 ml   Net -75.76 ml         Physical Exam   Temp: 98.8  F (37.1  C) Temp src: Axillary BP: (!) 84/67 Pulse: 144   Resp: 20 SpO2: 98 % O2 Device: Mechanical Ventilator Oxygen Delivery: 60 LPM  Vitals:    12/03/20 0930 12/04/20 0000   Weight: 101.7 kg (224 lb 3.3 oz) 103.2 kg (227 lb 8.2 oz)     Vital Signs with Ranges  Temp:  [97.7  F (36.5  C)-98.8  F (37.1  C)] 98.8  F (37.1  C)  Pulse:  [] 144  Resp:  [20-48] 20  BP: ()/(38-86) 84/67  MAP:  [60 mmHg-82 mmHg] 74 mmHg  Arterial Line BP: (60-95)/(46-74) 79/69  FiO2 (%):  [70 %-100 %] 70 %  SpO2:  [82 %-100 %] 98 %  I/O last 3 completed shifts:  In: 1263.44 [P.O.:120; I.V.:343.44; NG/GT:50; IV Piggyback:750]  Out: 1745 [Urine:1745]     , Blood pressure (!) 84/67, pulse 144, temperature 98.8  F (37.1  C), temperature source Axillary, resp. rate 20, height 1.778 m (5' 10\"), weight 103.2 kg (227 lb 8.2 oz), SpO2 98 %.  227 lbs 8.24 oz  GEN:  Alert, appears uncomfortable lying in hospital bed  CV:  LVAD hum   LUNGS:  On osymask, paroxysms of cough  ABD:  Soft, NT, ND  EXT:  No edema or cyanosis.    SKIN: Warm and dry, no lesions on exposed surfaces. Driveline is freshly bandaged and clean    Medications     [Held by provider] amiodarone       epoprostenol (VELETRI) 20 mcg/mL in sterile water inhalation solution 20 ng/kg/min (12/05/20 2485)     fentaNYL 50 " mcg/hr (12/05/20 0700)     midazolam Stopped (12/05/20 0123)     norepinephrine 0.09 mcg/kg/min (12/05/20 0700)     - MEDICATION INSTRUCTIONS -       propofol (DIPRIVAN) infusion 20 mcg/kg/min (12/05/20 0700)     sodium chloride 75 mL/hr at 12/04/20 1457     Warfarin Therapy Reminder         allopurinol  200 mg Oral Daily     amiodarone  200 mg Oral Daily     aspirin  81 mg Oral Daily     cefTRIAXone  1 g Intravenous Q24H     dexamethasone  6 mg Oral Q24H     doxycycline hyclate  100 mg Oral Q12H VITALY     insulin aspart  1-6 Units Subcutaneous Q4H     lidocaine  1 patch Transdermal Q24h    And     lidocaine   Transdermal Q8H     [Held by provider] losartan  25 mg Oral Daily     [Held by provider] metoprolol succinate ER  37.5 mg Oral Daily     norepinephrine         pantoprazole (PROTONIX) IV  40 mg Intravenous Daily with breakfast     polyethylene glycol  17 g Oral Daily     remdesivir  100 mg Intravenous Q24H     senna-docusate  1 tablet Oral At Bedtime     spironolactone  37.5 mg Oral Daily     thiamine  100 mg Oral Daily       Data   Recent Labs   Lab 12/05/20  0419 12/05/20  0404 12/04/20  2136 12/04/20  2103 12/04/20  1652 12/04/20  0540 12/03/20  1108 12/03/20  1108   WBC  --  18.4*  --   --   --  10.6  --  9.7   HGB  --  15.9  --   --   --  16.7  --  15.8   MCV  --  97  --   --   --  97  --  97   PLT  --  503*  --   --   --  323  --  251   INR  --  5.76* 6.63*  --   --  5.32*  --  4.25*   NA  --  139  --  142  --  138   < > 137   POTASSIUM  --  4.4  --  3.8  --  4.1   < > 3.6   CHLORIDE  --  109  --  110*  --  110*   < > 108   CO2  --  21  --  20  --  18*   < > 20   BUN  --  22  --  19  --  11   < > 11   CR  --  1.45*  --  1.59*  --  0.97   < > 1.18   ANIONGAP  --  9  --  13  --  11   < > 8   ASHLEE  --  8.7  --  8.6  --  9.0   < > 8.2*   * 169*  --  180*  --  126*   < > 110*   ALBUMIN  --   --   --  2.4*  --   --   --  2.6*   PROTTOTAL  --   --   --  6.5*  --   --   --  6.6*   BILITOTAL  --   --   --   1.1  --   --   --  1.2   ALKPHOS  --   --   --  130  --   --   --  124   ALT  --   --   --  47  --   --   --  27   AST  --   --   --  163*  --   --   --  39   TROPI  --   --   --   --  0.471* 0.475*  --   --     < > = values in this interval not displayed.       Recent Results (from the past 24 hour(s))   XR Chest Port 1 View    Narrative    EXAM: XR CHEST PORT 1 VW  12/4/2020 6:57 PM     HISTORY:  ett placement       COMPARISON:  12/3/2017, CT 12/4/2020    FINDINGS: Single view of the chest. Endotracheal tube tip projects  over the low thoracic trachea 0.9 cm from the salomón. Left hemithorax  cardiac device. Partially visualized LVAD. Partially visualized  enteric tube. Median sternotomy wires.  Slightly increased diffuse  mixed lung opacities. The left costophrenic sulcus is collimated  outside the field of view. Small right pleural effusion. Low lung  volumes. The cardiomediastinal silhouette is obscured.      Impression    IMPRESSION:   1. Endotracheal tube tip projects over the low thoracic trachea, 0.9  cm from the salomón. Consider slight retraction.  2. Increased mixed lung opacities concerning for worsening infection.    I have personally reviewed the examination and initial interpretation  and I agree with the findings.    JONATHAN MEYERS MD   XR Abdomen Port 1 View    Narrative    EXAM: XR ABDOMEN PORT 1   12/4/2020 6:57 PM      HISTORY: OG placement    COMPARISON: CT 12/3/2020    FINDINGS: Enteric tube sidehole projects expected location of the  stomach. LVAD. Partially visualized cardiac leads. Diffuse lung  opacities persist.    Nonobstructive bowel gas pattern. No pneumatosis. No portal venous  gas.       Impression    IMPRESSION:   1. Enteric tube sidehole projects at the expected location of the  stomach.  2. Nonobstructive bowel gas pattern.  3. Partially visualized diffuse mixed lung opacities better assessed  on same day radiograph.    I have personally reviewed the examination and initial  interpretation  and I agree with the findings.    JONATHAN MEYERS MD   Echo Complete    Narrative    [id number]  [id number]  [id number]  [id number]^SHIRLENE^MOHAN           [facility]  Echocardiography Laboratory  [address] [place] [address]     Name: JUAN SHEN  MRN: [id number]  : 1955  Study Date: 2020 07:49 PM  Age: 65 yrs  Gender: Male  Patient [place] [facility] [place]  Reason For Study: Elevated troponin  Ordering Physician: MOHAN GARBER  Performed By: Mary Soliz RDCS     BSA: 2.2 m2  Height: 70 in  Weight: 227 lb  HR: 132  BP: 84/67 mmHg  _____________________________________________________________________________  __        Procedure  LVAD Echocardiogram with two-dimensional, color and spectral Doppler  performed. Contrast Optison. Optison (NDC #1734-7196-41) given intravenously.  Patient was given 6 ml mixture of 3 ml Optison and 6 ml saline. 3 ml wasted.  The final echo results were communicated to Dr. Chung. The final echo results  were communicated to the ordering physician by phone .  _____________________________________________________________________________  __        Interpretation Summary  HeartMate 3 LVAD at 5500 RPM.  Normal LV size with severely reduced global LV function, LVEF<20%. LVIDd=5.1  cm.  Moderately dilated RV with severely reduced global RV function.  The ventricular septum is midline.  The aortic valve remains closed. There is mild continuous AI.  Severe TR with systolic flow reversal in the hepatic veins.  LVAD inflow cannula is visualized in the LV apex. LVAD outflow graft is  visualized in the aorta. Cannula velocities cannot be assessed.     This study was compared with the study from 19: LV size has decreased.  While RV function was likely moderately reduced on the prior study, RV  function appears to have decreased on today's study and RV size has increased.  Severe TR is  new.  _____________________________________________________________________________  __        Left Ventricle  Left ventricular size is normal. Left ventricular wall thickness is normal.  Severely (EF <30%) reduced left ventricular function is present. Diastolic  function not assessed due to presence of LVAD. LVAD cannula was seen in the  expected anatomic position in the LV apex.     Right Ventricle  Moderate right ventricular dilation is present. Global right ventricular  function is severely reduced. A pacemaker lead is noted in the right  ventricle.     Atria  The right atrium appears enlarged on limited views. The left atrium cannot be  assessed.     Mitral Valve  The mitral valve is normal.        Aortic Valve  The aortic valve remains closed. There is mild continuous AI.     Tricuspid Valve  Severe tricuspid insufficiency is present. PA pressure cannot be assessed due  to severe TR.     Pulmonic Valve  The pulmonic valve cannot be assessed.     Vessels  The aorta root is normal. The thoracic aorta is normal. The pulmonary artery  cannot be assessed. Dilation of the inferior vena cava is present with  abnormal respiratory variation in diameter. Unable to assess mean RA pressure  given the patient is on a ventilator. Hepatic vein flow consistent with severe  tricuspid insufficiency is present.     Pericardium  No pericardial effusion is present.        Compared to Previous Study  This study was compared with the study from 19 . LV size has decreased.  While RV function was likely moderately reduced on the prior study, RV  function appears to have decreased on today's study and RV size has increased.  Severe TR is new.  _____________________________________________________________________________  __  MMode/2D Measurements & Calculations     LVIDd: 5.1 cm        Doppler Measurements & Calculations  TR max jose: 229.0 cm/sec  TR max P.0 mmHg      _____________________________________________________________________________  __           Report approved by: Baron Bello 12/04/2020 08:54 PM      XR Chest Port 1 View    Impression    IMPRESSION:   1. Left IJ CVC tip projects over the low SVC.  2. Endotracheal tube projects 2.0 cm above the salomón.  3. Decreased mixed pulmonary opacities, concerning for infection.  4. Small right pleural effusion.

## 2020-12-05 NOTE — PHARMACY-CONSULT NOTE
Pharmacy Tube Feeding Consult    Medication reviewed for administration by feeding tube and for potential food/drug interactions.    Recommendation: No changes are needed at this time. If metoprolol succinate is unheld while intubated, will need to be changed to short acting formulation (tartrate) that can be crushed and administered enterally.      Pharmacy will continue to follow as new medications are ordered.

## 2020-12-05 NOTE — CONSULTS
Nephrology Initial Consult  December 5, 2020      Danielito Chu MRN:[id number] YOB: 1955  Date of Admission:12/3/2020  Primary care provider: Sapphire Stewart  Requesting physician: Ortega Garcia MD    ASSESSMENT AND RECOMMENDATIONS:     Danielito Chu is a 65 year old male with HM3 LVAD (12/2018) bridge to heart transplant, NICM, (LVEF at dong of 15%) s/p ICD (4/2017), DM2, CKD II, gout, GERD who was initially admitted to the ICU at [facility] on 12/3/2020 for evaluation of  fever, dyspnea and weakness and loss of appetite and transfer from [place] ED for ARHF 2/2 COVID pneumonia and possible LVAD drive line infection. Now with acute acute hypoxemic respiratory failure requiring mechanical ventilation and readmission to the ICU. Nephrology consulted for CRUZ       CRUZ secondary to sepsis related hypoperfusion resulting in ischemia .  Contrast associated nephropathy could be contributory to (contrast study on 12/3/2020)  Non Oliguric . UOP 2.5 Litre since MN   Cr 0.97 ( 12/4) --> 1.59 --> 1.37  FENA 0.2 -indicating towards prerenal mechanism  UA : Specific gravity 1.016, protein 30, blood: Moderate, , WBC 9.  Increased hyaline casts.  Nitrite negative, leukocyte Esterase negative.  With normal hemoglobin, increased platelets, TMA unlikely  AIN unlikely due to patient responding to resuscitative measures  CT chest abdomen pelvis: No hydronephrosis  BP /volume : 77/64 , hr 156  - on NE 0.07. Sating 99%, FIO2 70 % . CVP 8   Electrolytes : Lytes fine   Acid base : CO2 21 . Ph 7.38 ,pco2 39 .Lactic 5.5 --> 2.3   No Anemia : Hb 15  Antimicrobials : Doxy, Zosyn,Remdesivir,Vancomycin    Recommendations     1. No urgent indication for RRT . Will continue to closely monitor   2. Agree with volume resuscitation with LR 1000 ml   3. Hold cozaar, metoprolol, spironolactone   4. Daily labs , daily weight , I/O       Recommendations were communicated to primary team via note  Patient  seen and discussed with Dr Vikram Rodas MD, FACP  Nephrology Fellow   [facility]   Pager [phone]        REASON FOR CONSULT:  CRUZ     HISTORY OF PRESENT ILLNESS:  Admitting provider and nursing notes reviewed  Danielito Chu is a 65 year old male with HM3 LVAD (12/2018) bridge to heart transplant, NICM, (LVEF at dong of 15%) s/p ICD (4/2017), DM2, CKD II, gout, GERD who was initially admitted to the ICU at [facility] on 12/3/2020 for evaluation of  fever, dyspnea and weakness and loss of appetite and transfer from [place] ED for ARHF 2/2 COVID pneumonia and possible LVAD drive line infection. Now with acute acute hypoxemic respiratory failure requiring mechanical ventilation and readmission to the ICU. Nephrology consulted for CRUZ       PAST MEDICAL HISTORY:      Past Medical History:   Diagnosis Date     Acute systolic congestive heart failure (H) 4/5/2017     Diabetes (H)      HTN (hypertension)      Hyperlipidemia      Liver disease      LVAD (left ventricular assist device) present (H)     HM 3 12/18     Marijuana abuse      Mitral regurgitation      Nocturnal oxygen desaturation 3/29/2018     Nonischemic cardiomyopathy (H) 4/5/2017     SHIRLEY (obstructive sleep apnea)      Pacemaker 04/07/2017    ICD 4/7/17     Situational mixed anxiety and depressive disorder        Past Surgical History:   Procedure Laterality Date     CV RIGHT HEART CATH N/A 5/9/2019    Procedure: CV RIGHT HEART CATH;  Surgeon: Jules Allan MD;  Location:  HEART CARDIAC CATH LAB     CV RIGHT HEART CATH N/A 7/2/2020    Procedure: CV RIGHT HEART CATH;  Surgeon: Jeremy Mckeon MD;  Location:  HEART CARDIAC CATH LAB     ESOPHAGOSCOPY, GASTROSCOPY, DUODENOSCOPY (EGD), COMBINED N/A 11/21/2018    Procedure: COMBINED ESOPHAGOSCOPY, GASTROSCOPY, DUODENOSCOPY (EGD);  Surgeon: Candido Mendez MD;  Location:  GI     IMPLANT IMPLANTABLE CARDIOVERTER DEFIBRILLATOR       INSERT VENTRICULAR ASSIST DEVICE  LEFT (HEARTMATE II/III) MINIMALLY INVASIVE N/A 12/5/2018    Procedure: Partial Median Sternotomy, Left Thoracotomy, Minimally Invasive Left Ventricular Assist Device Placement (Heartmate III), On Cardiopulmonary Bypass;  Surgeon: Andrei Silva MD;  Location: [facility]        MEDICATIONS:  PTA Meds  Prior to Admission medications    Medication Sig Last Dose Taking? Auth Provider   allopurinol (ZYLOPRIM) 100 MG tablet Take 2 tablets (200 mg) by mouth daily   Melody Nassar MD   amiodarone (PACERONE) 200 MG tablet Take 1 tablet (200 mg) by mouth daily   Lorena Watkins MD   amoxicillin (AMOXIL) 500 MG capsule Take 4 capsules (2,000 mg) by mouth once as needed (Take 4 capsules (2000mg), one hour before any dental cleanings or procedures)   Lorena Watkins MD   aspirin (ASA) 81 MG chewable tablet Take 1 tablet (81 mg) by mouth daily   Derick Castellon PA   Blood Glucose Monitoring Suppl (BLOOD GLUCOSE MONITOR SYSTEM) w/Device KIT three times a week   Reported, Patient   bumetanide (BUMEX) 1 MG tablet Take 2 tablets (2 mg) by mouth 2 times daily   Ai Maravilla PA-C   colchicine (COLCRYS) 0.6 MG tablet One tab 0.6 mg on Mon/Wed/Fri x 1 month and if no gout flare up, stop it   Melody Nassar MD   enoxaparin (LOVENOX) 100 MG/ML syringe Inject 100 mg (1 ml) every 12 hours as directed by the Anticoagulation Clinic.  Patient not taking: Reported on 7/16/2020   Lorena Watkins MD   losartan (COZAAR) 25 MG tablet Take 1 tablet (25 mg) by mouth daily   Lorena Watkins MD   metoprolol succinate ER (TOPROL XL) 25 MG 24 hr tablet Take 1.5 tablets (37.5 mg) by mouth daily   Ai Maravilla PA-C   potassium chloride ER (KLOR-CON) 10 MEQ CR tablet Take 2 tablets (20 mEq) by mouth 2 times daily   Lorena Watkins MD   spironolactone (ALDACTONE) 25 MG tablet TAKE 1.5 TABLETS (37.5 MG) BY MOUTH DAILY   Lorena Watkins MD   tacrolimus (PROTOPIC) 0.1 % external  ointment Apply topically 2 times daily  Patient not taking: Reported on 7/16/2020   Moisés Krause MD   traZODone (DESYREL) 100 MG tablet TAKE 1 TABLET BY MOUTH EVERY DAY AT BEDTIME AS NEEDED FOR SLEEP  Patient not taking: Reported on 7/16/2020   Lorena Watkins MD   vitamin B1 (THIAMINE) 100 MG tablet Take 1 tablet (100 mg) by mouth daily  Patient not taking: Reported on 7/16/2020   Lorena Watkins MD   warfarin ANTICOAGULANT (COUMADIN) 2 MG tablet TAKE 1 & 1/2 TABLET BY MOUTH EACH DAY AT 6PM   Ashley Araujo MD      Current Meds    allopurinol  200 mg Oral Daily     amiodarone  200 mg Oral Daily     aspirin  81 mg Oral Daily     dexamethasone  6 mg Oral Q24H     doxycycline hyclate  100 mg Oral Q12H VITALY     insulin aspart  1-6 Units Subcutaneous Q4H     lactated ringers  250 mL Intravenous Once     lidocaine  1 patch Transdermal Q24h    And     lidocaine   Transdermal Q8H     [Held by provider] losartan  25 mg Oral Daily     magnesium sulfate  2 g Intravenous Once     [Held by provider] metoprolol succinate ER  37.5 mg Oral Daily     [START ON 12/6/2020] pantoprazole  40 mg Oral QAM AC     piperacillin-tazobactam  4.5 g Intravenous Q6H     polyethylene glycol  17 g Oral Daily     protein modular  1 packet Per Feeding Tube BID     remdesivir  100 mg Intravenous Q24H     senna-docusate  1 tablet Oral At Bedtime     [Held by provider] spironolactone  37.5 mg Oral Daily     thiamine  100 mg Oral Daily     [START ON 12/6/2020] vancomycin (VANCOCIN) IV  1,750 mg (central catheter) Intravenous Q24H     Infusion Meds    [Held by provider] amiodarone       dexmedetomidine 0.2 mcg/kg/hr (12/05/20 1800)     dextrose       epoprostenol (VELETRI) 20 mcg/mL in sterile water inhalation solution 20 ng/kg/min (12/05/20 1439)     fentaNYL 100 mcg/hr (12/05/20 1826)     midazolam Stopped (12/05/20 0123)     norepinephrine 0.05 mcg/kg/min (12/05/20 1800)     - MEDICATION INSTRUCTIONS -       sodium chloride  75 mL/hr at 20 1457       ALLERGIES:    No Known Allergies    REVIEW OF SYSTEMS:  Unable to - patient intubated     SOCIAL HISTORY:   Social History     Socioeconomic History     Marital status: Single     Spouse name: Not on file     Number of children: Not on file     Years of education: Not on file     Highest education level: Not on file   Occupational History     Not on file   Social Needs     Financial resource strain: Not on file     Food insecurity     Worry: Not on file     Inability: Not on file     Transportation needs     Medical: Not on file     Non-medical: Not on file   Tobacco Use     Smoking status: Former Smoker     Packs/day: 0.30     Years: 20.00     Pack years: 6.00     Types: Cigarettes     Smokeless tobacco: Never Used     Tobacco comment: quit 3/2017   Substance and Sexual Activity     Alcohol use: No     Frequency: Never     Comment: rare     Drug use: No     Sexual activity: Not on file   Lifestyle     Physical activity     Days per week: Not on file     Minutes per session: Not on file     Stress: Not on file   Relationships     Social connections     Talks on phone: Not on file     Gets together: Not on file     Attends [employer] service: Not on file     Active member of club or organization: Not on file     Attends meetings of clubs or organizations: Not on file     Relationship status: Not on file     Intimate partner violence     Fear of current or ex partner: Not on file     Emotionally abused: Not on file     Physically abused: Not on file     Forced sexual activity: Not on file   Other Topics Concern     Parent/sibling w/ CABG, MI or angioplasty before 65F 55M? Not Asked   Social History Narrative     Not on file           FAMILY MEDICAL HISTORY:   Family History   Problem Relation Age of Onset     Heart Failure Father          at age 86     Heart Failure Paternal Uncle          at 66      Myocardial Infarction Paternal Uncle          at age 62     Coronary Artery  "Disease Mother          during CABG at age 41         PHYSICAL EXAM:   Temp  Av.7  F (37.1  C)  Min: 96.8  F (36  C)  Max: 102.9  F (39.4  C)  Arterial Line BP  Min: 60/54  Max: 261/191  Arterial Line MAP (mmHg)  Av.4 mmHg  Min: 57 mmHg  Max: 158 mmHg      Pulse  Av.2  Min: 82  Max: 158 Resp  Av.9  Min: 11  Max: 48  FiO2 (%)  Av.1 %  Min: 70 %  Max: 100 %  SpO2  Av.4 %  Min: 82 %  Max: 100 %    CVP (mmHg): 10 mmHg  BP (!) 84/67   Pulse 141   Temp 102.9  F (39.4  C) (Oral)   Resp 20   Ht 1.778 m (5' 10\")   Wt 103.2 kg (227 lb 8.2 oz)   SpO2 98%   BMI 32.65 kg/m     Date 20 0700 - 20 0659   Shift 3856-2550 9238-1383 6599-5815 24 Hour Total   INTAKE   I.V. 751.8 294  1045.8   NG/   100   IV Piggyback  250  250   Shift Total(mL/kg) 851.8(8.25) 544(5.27)  1395.8(13.53)   OUTPUT   Urine 730 415  1145   Shift Total(mL/kg) 730(7.07) 415(4.02)  1145(11.1)   Weight (kg) 103.2 103.2 103.2 103.2      Admit Weight: 101.7 kg (224 lb 3.3 oz)   Exam deferred   LABS:   CMP  Recent Labs   Lab 20  1637 20  0419 20  0404 20  2103 20  0540 20  1406 20  1406 20  1108     --  139 142 138   < >  --  137   POTASSIUM 4.1  --  4.4 3.8 4.1   < > 4.0 3.6   CHLORIDE 113*  --  109 110* 110*   < >  --  108   CO2 21  --  21 20 18*   < >  --  20   ANIONGAP 8  --  9 13 11   < >  --  8   * 166* 169* 180* 126*   < >  --  110*   BUN 25  --  22 19 11   < >  --  11   CR 1.37*  --  1.45* 1.59* 0.97   < >  --  1.18   GFRESTIMATED 54*  --  50* 45* 82   < >  --  65   GFRESTBLACK 62  --  58* 52* >90   < >  --  75   ASHLEE 8.3*  --  8.7 8.6 9.0   < >  --  8.2*   MAG 1.7  --   --   --  2.2  --  1.8 1.8   PHOS  --   --   --   --  2.8  --   --  2.2*   PROTTOTAL  --   --  6.8 6.5*  --   --   --  6.6*   ALBUMIN  --   --  2.6* 2.4*  --   --   --  2.6*   BILITOTAL  --   --  0.9 1.1  --   --   --  1.2   ALKPHOS  --   --  131 130  --   --   --  124   AST "  --   --  367* 163*  --   --   --  39   ALT  --   --  148* 47  --   --   --  27    < > = values in this interval not displayed.     CBC  Recent Labs   Lab 12/05/20  1637 12/05/20  0404 12/04/20  0540 12/03/20  1108   HGB 15.0 15.9 16.7 15.8   WBC  --  18.4* 10.6 9.7   RBC  --  4.92 5.09 4.82   HCT  --  47.9 49.4 46.5   MCV  --  97 97 97   MCH  --  32.3 32.8 32.8   MCHC  --  33.2 33.8 34.0   RDW  --  15.6* 15.3* 15.0   PLT  --  503* 323 251     INR  Recent Labs   Lab 12/05/20  0404 12/04/20  2136 12/04/20  0540 12/03/20  1108   INR 5.76* 6.63* 5.32* 4.25*     ABG  Recent Labs   Lab 12/05/20  1446 12/05/20  1327 12/05/20  0749 12/05/20  0419 12/05/20  0054 12/05/20  0054   PH 7.38  --  7.38 7.38  --  7.35   PCO2 39  --  39 36  --  38   PO2 101  --  100 109*  --  156*   HCO3 23  --  23 21  --  21   O2PER 70 70 70.0 80   < > 100    < > = values in this interval not displayed.      URINE STUDIES  Recent Labs   Lab Test 12/05/20  1327 12/04/20  2256 03/04/20  1400 04/26/19  1437   COLOR Yellow Yellow Yellow Yellow   APPEARANCE Slightly Cloudy Slightly Cloudy Clear Clear   URINEGLC Negative Negative Negative Negative   URINEBILI Negative Negative Negative Negative   URINEKETONE Negative Negative Negative Negative   SG 1.016 1.015 1.013 1.013   UBLD Moderate* Moderate* Negative Negative   URINEPH 5.0 5.5 5.0 5.0   PROTEIN 30* 30* Negative Negative   NITRITE Negative Negative Negative Negative   LEUKEST Negative Negative Negative Negative   RBCU 103* 9* 2 2   WBCU 9* 0 1 1     No lab results found.  PTH  No lab results found.  IRON STUDIES  Recent Labs   Lab Test 10/24/18  0913 10/24/18  0634 03/30/17  0724   IRON 75 Canceled, Test credited 56   * Canceled, Test credited 411   IRONSAT 17 Not Calculated 14*   CHERELLE  --  67 290       IMAGING:  All imaging studies reviewed by me.     Clark Hess MD        I was present with the fellow during the history and exam.  I discussed the case with the fellow and agree with the  findings as documented in the assessment and plan. Critically ill, will monitor closely as ICU team resuscitates him.  Justa Sue

## 2020-12-05 NOTE — PROGRESS NOTES
Gold 9 provider notified for pt increasing SOB, tachypneic with rr 30s - 40s, with increasing accessory muscle use. LVAD parameters WNL. MAP 60s - 70s with HR 140s - 150s on Amio gtt. O2 sat difficult to obtain due to low signal strength, but sats ranging mid 70s - low 90s on 15 L Oxy plus, most frequently reading low 80s. Pt not tolerating BiPap. RT called for assistance with HFNC as alternative to BiPap. Provider assessed at bedside. Stat Lactate, VBG + Troponin ordered. Amio gtt stopped. PRN Ativan ordered, 05. + 1 mg given. Resource RN was called for additional support. Pt was transferred to  with plan to intubate. Report given to Rhea NAZARIO.

## 2020-12-05 NOTE — H&P
MEDICAL ICU H&P  12/04/2020    Date of Hospital Admission: 12/3/20  Date of ICU Admission: Initial ICU admission 12/3/20, ICU readmission 12/4/20.  Reason for Critical Care Admission: Acute hypoxic respiratory failure.  Date of Service (when I saw the patient): 12/04/2020    ASSESSMENT:   Danielito Chu is a 65 year old male with HM3 LVAD (12/2018) bridge to heart transplant, NICM, (LVEF at dong of 15%) s/p ICD (4/2017), DM2, CKD II, gout, GERD who was initially admitted to the ICU at [facility] on 12/3/2020 for evaluation of  fever, dyspnea and weakness and loss of appetite and transfer from [place] ED for ARHF 2/2 COVID pneumonia and possible LVAD drive line infection. Now with acute acute hypoxemic respiratory failure requiring mechanical ventilation and readmission to the ICU.      PLAN:    Neuro:  # Pain and sedation  Analgesia: Fentanyl gtt + PRN pushes.  Sedation: Versed gtt > switch to propofol.  - RASS goal -1/-2.    # Adjustment disorder  Evaluated by psychiatry on 12/4, patient expressed anxiety about upcoming procedures. Declined acute treatment.  -Pending health psychology consult, deferred until extubated and alert.    Pulmonary:  # Acute hypoxemic respiratory failure 2/2 Severe COVID-19 infection c/b severe ARDS  Exposed 11/24, tested + 11/30. Was not having cough or shortness of breath but did check O2 sat's with home SpO2 probe which revealed mid 80s on 12/2 which lead him to present to [place] ED. Arrived to [facility] on 12/3 oxymask 6L sating 96% and was transferred to the floor. On 12/4 acutely decompensated requiring mechanical ventilation. CXR showing bilateral worsening infiltrates, ABG with reduced P/F ratio (<100).  -Mechanical ventilation start date: 12/4/20.  -Hypoxemia:   -Inhaled epoprostenol: 12/4-present.   -Prone: No.   -Paralysis: No.  -Dexamethasone: 12/3-12/12.  -DVT prophylaxis: INR 5.32 on 12/4 AM, TEG ordered.   -Holding warfarin per cards, when INR is trending to therapeutic level,  would start high intensity heparin gtt.    Antimicrobials:  -Remdesivir (12/3-12/7).  -Ceftriaxone (12/3-12/7).  -Doxycycline (12/3-12/7).    Cardiovascular:  # Shock  Warm extremities, capillary refill 2-3 secs.  -Hypovolemic: Overall slightly net positive, may benefit from fluid.  -Obstructive: No PNT, no pericardial effusion.  -Cardiogenic: LVEF <20%, new moderately dilated RV with severely reduced global RV function. New severe TR.  -Distributive: Concerning with warm with adequately capillary refill.    # Biventricular heart failure  Noted on TTE 12/4, new severe TR.  -Goal of minimal PEEP to decrease stress on RV.  -Holding metoprolol.    # HFrEF 2/2 NICM s/p 3 LVAD as BTT on 12/5/2018, listed as status 7  # s/p single chamber ICD (4/2017  -Holding spironolactone, losartan, bumex given acute HD decompensation.    # Outflow graft obstruction  Incidental findings on CT on 12/3.  -CT surgery consulted (12/4): Expectant management for outflow graft obstruction, to be considered for potential transplant after COVID 19    # Persistent atrial tachycardia  S/P IV amiodarone.  -Continue PO amiodarone for atrial tachycardia, no need to aggressively rate control given presence of LVAD.  -Holding metoprolol   -Cardiology following.    # Lactic acidosis  Likely in the setting of hypoperfusion from hypoxemia/hypotension.    GI/Nutrition:  # NG  -Nutrition consult in AM.    Renal/Fluids/Electrolytes:  # Oligoanuric CRUZ  Bl Cr 1.1-1.2. Noted acute decrease in UOP for the past 6 hours prior to ICU admission with creatinine increase to 1.59. ABG with noted acidosis s/p 1 amp of bicarb, no acute electrolyte abnormalities, no hypervolemia, no uremia.  -Workup: FENa, Marcos, UA, renal US.   -Renal consult, appreciate recs.   -No acute indications for RRT, will continue to follow.   -diuretic challenge with 1 time bumex 4mg IV, discussed with cardiology as well and noted CVP of 10, hence recommended a lower dose given no hypervolemia,  will plan on 2 mg.  -Avoid nephrotoxins.  -Strict I/Os.  -Daily weight.    Endocrine:  # History of DM  No meds PTA. Last A1c 5.9%.  -Medium sliding scale insulin.    ID:  # Concern for septic shock 2/2 with concerning acute on chronic LVAD drive line drainage   Hyperattenuation of soft tissue adjacent to drive line without fluid collection on CT.  -ID consulted (12/4): Continue doxycycline and ceftriaxone.  -Added anaerobic culture of drive line exit site.  -Following blood cultures.    Micro:  LVAD driveline exit site (12/3): Culture in progress.  Blood cultures x2 (12/3): NGTD.    Hematology:    # Supratherapeutic anticoagulation 2/2 LVAD   Holding warfarin per cards, when INR is trending to therapeutic level, would start high intensity heparin gtt.    Musculoskeletal:  #Gout  - continue PTA allopurinol  - HOLD PTA colchicine    Skin:  #No acute concerns    General Cares/Prophylaxis:    DVT Prophylaxis: Pneumatic Compression Devices  GI Prophylaxis: PPI  Restraints: None.  Family Communication: Updated by phone.  Code Status: Full code.    Lines/tubes/drains:  - ETT, brown, NG, A line, CVC.    Disposition:  - Medical ICU.    Patient seen and findings/plan discussed with medical ICU staff, Dr. Fitch.    Gonzalo Rivas    -----------------------------------------------------------------------    HISTORY PRESENTING ILLNESS:   Fabiano Chu is a 65-year-old male with history of nonischemic cardiomyopathy, stage D HFrEF, s/p ICD and LVAD placement as BTT therapy.  He was transferred from LTAC[facility] ED 12/3 to the ICU for acute hypoxic respiratory failure secondary to COVID-19.     Patient was transferred out of the ICU in 12/3 and on 12/4 developed acute respiratory distress while wearing BPAP, tachycardia requiring emergency mechanical ventilation in the setting of acute hypoxemic respiratory failure.    REVIEW OF SYSTEMS: Negative as otherwise mentioned in HPI.    PAST MEDICAL HISTORY:   Past Medical History:    Diagnosis Date     Acute systolic congestive heart failure (H) 4/5/2017     Diabetes (H)      HTN (hypertension)      Hyperlipidemia      Liver disease      LVAD (left ventricular assist device) present (H)     HM 3 12/18     Marijuana abuse      Mitral regurgitation      Nocturnal oxygen desaturation 3/29/2018     Nonischemic cardiomyopathy (H) 4/5/2017     SHIRLEY (obstructive sleep apnea)      Pacemaker 04/07/2017    ICD 4/7/17     Situational mixed anxiety and depressive disorder      SURGICAL HISTORY:  Past Surgical History:   Procedure Laterality Date     CV RIGHT HEART CATH N/A 5/9/2019    Procedure: CV RIGHT HEART CATH;  Surgeon: Jules Allan MD;  Location:  HEART CARDIAC CATH LAB     CV RIGHT HEART CATH N/A 7/2/2020    Procedure: CV RIGHT HEART CATH;  Surgeon: Jeremy Mckeon MD;  Location:  HEART CARDIAC CATH LAB     ESOPHAGOSCOPY, GASTROSCOPY, DUODENOSCOPY (EGD), COMBINED N/A 11/21/2018    Procedure: COMBINED ESOPHAGOSCOPY, GASTROSCOPY, DUODENOSCOPY (EGD);  Surgeon: Candido Mendez MD;  Location:  GI     IMPLANT IMPLANTABLE CARDIOVERTER DEFIBRILLATOR       INSERT VENTRICULAR ASSIST DEVICE LEFT (HEARTMATE II/III) MINIMALLY INVASIVE N/A 12/5/2018    Procedure: Partial Median Sternotomy, Left Thoracotomy, Minimally Invasive Left Ventricular Assist Device Placement (Heartmate III), On Cardiopulmonary Bypass;  Surgeon: Andrei Silva MD;  Location:  OR     SOCIAL HISTORY:  Social History     Socioeconomic History     Marital status: Single     Spouse name: Not on file     Number of children: Not on file     Years of education: Not on file     Highest education level: Not on file   Occupational History     Not on file   Social Needs     Financial resource strain: Not on file     Food insecurity     Worry: Not on file     Inability: Not on file     Transportation needs     Medical: Not on file     Non-medical: Not on file   Tobacco Use     Smoking status: Former Smoker      Packs/day: 0.30     Years: 20.00     Pack years: 6.00     Types: Cigarettes     Smokeless tobacco: Never Used     Tobacco comment: quit 3/2017   Substance and Sexual Activity     Alcohol use: No     Frequency: Never     Comment: rare     Drug use: No     Sexual activity: Not on file   Lifestyle     Physical activity     Days per week: Not on file     Minutes per session: Not on file     Stress: Not on file   Relationships     Social connections     Talks on phone: Not on file     Gets together: Not on file     Attends Orthodoxy service: Not on file     Active member of club or organization: Not on file     Attends meetings of clubs or organizations: Not on file     Relationship status: Not on file     Intimate partner violence     Fear of current or ex partner: Not on file     Emotionally abused: Not on file     Physically abused: Not on file     Forced sexual activity: Not on file   Other Topics Concern     Parent/sibling w/ CABG, MI or angioplasty before 65F 55M? Not Asked   Social History Narrative     Not on file     FAMILY HISTORY:   Family History   Problem Relation Age of Onset     Heart Failure Father          at age 86     Heart Failure Paternal Uncle          at 66      Myocardial Infarction Paternal Uncle          at age 62     Coronary Artery Disease Mother          during CABG at age 41     ALLERGIES:   No Known Allergies  MEDICATIONS:  No current facility-administered medications on file prior to encounter.        allopurinol (ZYLOPRIM) 100 MG tablet, Take 2 tablets (200 mg) by mouth daily       amiodarone (PACERONE) 200 MG tablet, Take 1 tablet (200 mg) by mouth daily       amoxicillin (AMOXIL) 500 MG capsule, Take 4 capsules (2,000 mg) by mouth once as needed (Take 4 capsules (2000mg), one hour before any dental cleanings or procedures)       aspirin (ASA) 81 MG chewable tablet, Take 1 tablet (81 mg) by mouth daily       Blood Glucose Monitoring Suppl (BLOOD GLUCOSE MONITOR SYSTEM)  w/Device KIT, three times a week       bumetanide (BUMEX) 1 MG tablet, Take 2 tablets (2 mg) by mouth 2 times daily       colchicine (COLCRYS) 0.6 MG tablet, One tab 0.6 mg on Mon/Wed/Fri x 1 month and if no gout flare up, stop it       enoxaparin (LOVENOX) 100 MG/ML syringe, Inject 100 mg (1 ml) every 12 hours as directed by the Anticoagulation Clinic. (Patient not taking: Reported on 7/16/2020)       losartan (COZAAR) 25 MG tablet, Take 1 tablet (25 mg) by mouth daily       metoprolol succinate ER (TOPROL XL) 25 MG 24 hr tablet, Take 1.5 tablets (37.5 mg) by mouth daily       potassium chloride ER (KLOR-CON) 10 MEQ CR tablet, Take 2 tablets (20 mEq) by mouth 2 times daily       spironolactone (ALDACTONE) 25 MG tablet, TAKE 1.5 TABLETS (37.5 MG) BY MOUTH DAILY       tacrolimus (PROTOPIC) 0.1 % external ointment, Apply topically 2 times daily (Patient not taking: Reported on 7/16/2020)       traZODone (DESYREL) 100 MG tablet, TAKE 1 TABLET BY MOUTH EVERY DAY AT BEDTIME AS NEEDED FOR SLEEP (Patient not taking: Reported on 7/16/2020)       vitamin B1 (THIAMINE) 100 MG tablet, Take 1 tablet (100 mg) by mouth daily (Patient not taking: Reported on 7/16/2020)       warfarin ANTICOAGULANT (COUMADIN) 2 MG tablet, TAKE 1 & 1/2 TABLET BY MOUTH EACH DAY AT 6PM        PHYSICAL EXAMINATION:  Temp:  [96.8  F (36  C)-98.5  F (36.9  C)] 98.5  F (36.9  C)  Pulse:  [127-153] 137  Resp:  [20-48] 20  BP: ()/(38-86) 98/66  FiO2 (%):  [100 %] 100 %  SpO2:  [82 %-97 %] 96 %  General: Sedated, RASS -3/-4.  HEENT: NC/AT.  Neuro: Sedated.  Pulm/Resp: Clear breath sounds bilaterally without rhonchi, crackles or wheeze, breathing non-labored, synchronous to ventilator.  CV: Tachycardic regular heart sounds, no additional sounds.  Abdomen: Soft, non-distended, non-tender.  : brown catheter in place, urine yellow and clear, minimal output.  Incisions/Skin: Warm extremities, adequate capillary refill.    LABS: Reviewed.   Arterial Blood  Gases   Recent Labs   Lab 12/04/20 2008 12/03/20  1100   PH 7.15* 7.43   PCO2 42 26*   PO2 95 79*   HCO3 15* 17*     Complete Blood Count   Recent Labs   Lab 12/04/20  0540 12/03/20  1108   WBC 10.6 9.7   HGB 16.7 15.8    251     Basic Metabolic Panel  Recent Labs   Lab 12/04/20  0540 12/03/20  2348 12/03/20  1406 12/03/20  1108    135  --  137   POTASSIUM 4.1 4.3 4.0 3.6   CHLORIDE 110* 108  --  108   CO2 18* 19*  --  20   BUN 11 12  --  11   CR 0.97 1.07  --  1.18   * 159*  --  110*     Liver Function Tests  Recent Labs   Lab 12/04/20  0540 12/03/20  1108   AST  --  39   ALT  --  27   ALKPHOS  --  124   BILITOTAL  --  1.2   ALBUMIN  --  2.6*   INR 5.32* 4.25*     Coagulation Profile  Recent Labs   Lab 12/04/20  0540 12/03/20  1108   INR 5.32* 4.25*       IMAGING:  Recent Results (from the past 24 hour(s))   CT Abdomen Pelvis w Contrast   Result Value    Radiologist flags (Urgent)     Filling defect of the LVAD outflow cannula concerning    Narrative    CT of the Abdomen and Pelvis without contrast, 12/3/2020 8:38 PM.    Comparison: CT 10/20/2018.    History: assess LVAD infection site (may need cuts of chest as well).     Technique: Axial images of the  abdomen and pelvis were obtained  without contrast. Coronal reconstructions were provided. Images were  reviewed in bone, lung, and soft tissue windows.    Total DLP: 1722 mGy*cm.    Findings:  Exam and is partially degraded by motion artifact.    Chest: The visualized esophagus appears unremarkable. Patchy  groundglass opacities of the lung bases. Trace bilateral pleural  effusions. LVAD placement. Cardiomegaly. Partial visualization LVAD..  Filling defect of the outflow cannula. LVAD drive line exits  percutaneously through the the left upper abdominal quadrant.  Hyperattenuation of the soft tissues adjacent to the drive line  without focal fluid collection.    Abdomen and Pelvis: Nodular margin of the liver capsule evaluation is  partially  limited by motion artifact. No opaque gallbladder calculi.  No intrahepatic or extrahepatic biliary dilatation.  Pancreas  unremarkable. Spleen size within normal limits. No suspicious adrenal  mass lesions. No evidence of hydronephrosis. Visualized ureters and  urinary bladder is unremarkable.  Bilateral fat-containing hernias. No  free fluid. Diverticulosis without evidence of diverticulitis. Normal  caliber large and small bowel.   Atheromatous calcifications of the  aortoiliac vasculature. No suspicious or enlarged mesenteric,  retroperitoneal and pelvic lymph nodes.     Bones and Soft Tissues: Degenerative change of the spine. Compression  fracture of T7 with approximately 30% loss of vertebral body height.  Bilateral gynecomastia.      Impression    Impression:   1. Filling defect of the LVAD outflow cannula concerning for thrombus.  2. Hyperattenuation of the soft tissues adjacent to the left upper  abdominal quadrant drive line without focal fluid collection. Findings  can be seen in setting of inflammation or infection  3. Patchy groundglass opacities in the lung bases consistent with  known COVID-19 infection.  4. Nodular margin of the liver capsule which may represent cirrhosis  although evaluation is limited by motion artifact.  5. Compression deformity of T7 with approximately 30 percent loss of  vertebral body height.    [Urgent Result: Filling defect of the LVAD outflow cannula concerning  for thrombus.]    Finding was identified on 12/3/2020 8:48 PM.     Provider Audrey was contacted by Dr. Sanders at 12/3/2020 9:14 PM and  verbalized understanding of the urgent finding.      CTA Chest with Contrast    Narrative    Exam: Computed tomographic angiography of the chest without and with  contrast including 3D reformations dated 12/4/2020 1:43 AM    Clinical information: LVAD OUTFLOW GRAFT THROMBUS? Please include  upper part of the abdomen    Technique: Helical scans through the chest obtained before  the  administration of intravenous contrast media and following the  injection of contrast media in the arterial phase. Source images  reviewed as well as 3D and multi-planar reconstructions.    Contrast: 100 ml isovue 370     DLP: 709 mGy*cm    Comparison: CT abdomen and pelvis 12/3/2020 and CT chest 12/8/2018    Findings:  There is redemonstration of nonocclusive thrombus extending throughout  the entirety of the LVAD outflow cannula with almost 50% stenosis  approximately 2.6 cm from the aortic anastomosis. This does not appear  significantly changed since CT performed 4 hours earlier on 12/3/2020  at 20:36 hours.    Thoracic aortic diameters:  No thoracic aortic aneurysm.  Sinuses of Valsalva: 3.3 cm.   Ascending aorta: 2.9 cm.   Aortic arch: 2.9 cm.   Proximal thoracic aorta: 2.8 cm.   Mid thoracic aorta: 2.7 cm.   Descending thoracic aorta: 2.5 cm.     Patent Bovine arch. Origins of the brachiocephalic artery, left common  carotid artery and left subclavian artery show no focal abnormality.  The proximal pulmonary vasculature  appears normal. The celiac axis  and superior mesenteric arteries are patent. Small linear filling  defect within the proximal celiac artery (series 12 image 517 and  series 13 image 96) which extends to the proximal common hepatic  artery. The bilateral renal arteries are patent. Small amount of mural  thrombus within the infrarenal abdominal aorta (series 12 image 678)  is not significantly changed. The splenic vein, portal vein, SMV and  renal veins are patent. IVC is patent.    Chest:   Mild layering debris within the trachea, otherwise the central  tracheobronchial tree is patent. Diffuse patchy groundglass opacities  and interlobular septal thickening, with mild sparing of the right  middle lobe. Trace bilateral pleural effusions. LVAD device with drive  line exits percutaneously through the the left upper abdominal  quadrant. Hyperattenuation of the soft tissues adjacent to the  drive  line without focal fluid collection. Left chest wall implantable  cardiac defibrillator. Cardiomegaly. Normal caliber main pulmonary  artery. Scattered prominent mediastinal lymph nodes.    Upper abdomen:  Liver surface irregular in contour. Mild fatty atrophy of the  pancreas.    Bones:  Multilevel degenerative changes of the spine. Stable compression  deformity of T7. Age indeterminate compression deformity of T4 with  approximately 40% vertebral body height loss, new since CT on  12/8/2018.      Impression    Impression:  1. Redemonstration of nonocclusive thrombus extending throughout the  entirety of the LVAD outflow cannula with almost 50% stenosis  approximately 2.6 cm from the aortic anastomosis.  2. Small amount of nonocclusive thrombus within the proximal celiac  artery.  3. Diffuse patchy groundglass opacities and interlobular septal  thickening consistent with known COVID-19 pneumonia.  4. Stable hazy soft tissue hyperattenuation in the left upper quadrant  near the LVAD drive line without focal fluid collection.   5. Compression deformity of T4 and T7 with approximately 30% height  loss, both new since 12/8/2018.      Imaging findings discussed with Dr. Mondragon by Dr. Trev Thomas at  2:45AM on 12/4/2020.    I have personally reviewed the examination and initial interpretation  and I agree with the findings.    CHRISTI THOMPSON MD   XR Chest Port 1 View    Narrative    EXAM: XR CHEST PORT 1 VW  12/4/2020 6:57 PM     HISTORY:  ett placement       COMPARISON:  12/3/2017, CT 12/4/2020    FINDINGS: Single view of the chest. Endotracheal tube tip projects  over the low thoracic trachea 0.9 cm from the salomón. Left hemithorax  cardiac device. Partially visualized LVAD. Partially visualized  enteric tube. Median sternotomy wires.  Slightly increased diffuse  mixed lung opacities. The left costophrenic sulcus is collimated  outside the field of view. Small right pleural effusion. Low lung  volumes. The  cardiomediastinal silhouette is obscured.      Impression    IMPRESSION:   1. Endotracheal tube tip projects over the low thoracic trachea, 0.9  cm from the salomón. Consider slight retraction.  2. Increased mixed lung opacities concerning for worsening infection.    I have personally reviewed the examination and initial interpretation  and I agree with the findings.    JONATHAN MEYERS MD   XR Abdomen Port 1 View    Narrative    EXAM: XR ABDOMEN PORT 1 VW  12/4/2020 6:57 PM      HISTORY: OG placement    COMPARISON: CT 12/3/2020    FINDINGS: Enteric tube sidehole projects expected location of the  stomach. LVAD. Partially visualized cardiac leads. Diffuse lung  opacities persist.    Nonobstructive bowel gas pattern. No pneumatosis. No portal venous  gas.       Impression    IMPRESSION:   1. Enteric tube sidehole projects at the expected location of the  stomach.  2. Nonobstructive bowel gas pattern.  3. Partially visualized diffuse mixed lung opacities better assessed  on same day radiograph.    I have personally reviewed the examination and initial interpretation  and I agree with the findings.    JONATHAN MEYERS MD

## 2020-12-05 NOTE — PROGRESS NOTES
MICU MD notified of low PI on LVAD after moderate response to 2 of bumex. 250cc bolus of LR ordered. PI not responsive to fluid.     Cards fellow notified of persistent low PI without alarms. Given BP stable on pressor and CVP stable at 10. No additional interventions from cardiology. Continue to monitor for now and notify MD of any further changes/instability.

## 2020-12-05 NOTE — PROGRESS NOTES
MEDICAL ICU PROGRESS NOTE  12/05/2020      Date of Service (when I saw the patient): 12/05/2020    ASSESSMENT: Raphael HM3 LVAD (12/2018) bridge to heart transplant, NICM, (LVEF at dong of 15%) s/p ICD (4/2017), DM2, CKD II, gout, GERD who was initially admitted to the ICU at [facility] on 12/3/2020 for evaluation of  fever, dyspnea and weakness and loss of appetite and transfer from [place] ED for ARHF 2/2 COVID pneumonia 12/2, transferred to floor 12/3, who acutely decompensated with hypoxic respiratory failure requiring intubation 12/4, acute on chronic heart failure on LVAD, new oliguria, septic shock and LVAD thrombus.     CHANGES and MAJOR THINGS TODAY:   - pull ETT back 1-2 cm  - wean PEEP to 12  - LE doppler negative for DVT  - start vanco/zosyn  - goal net even to -1L today  - 250 mL bolus, can repeat for goal CVP 10-12  - LFTs and lipase added on  - plan to restart heparin once INR <3  - CRUZ improving    PLAN:     Neuro:  # Pain and sedation  Analgesia: Fentanyl gtt + PRN pushes  Sedation: Precedex gtt  - RASS goal -1/-2     # Adjustment disorder  Evaluated by psychiatry on 12/4, patient expressed anxiety about upcoming procedures. Declined acute treatment.  -Pending health psychology consult, deferred until extubated and alert.     Pulmonary:  # Acute hypoxemic respiratory failure 2/2 Severe COVID-19 infection c/b severe ARDS  Transfered to [facility] on 12/3 oxymask 6L sating 96% and was transferred to the floor. On 12/4 acutely decompensated requiring mechanical ventilation. CXR showing bilateral worsening infiltrates.   -Intubated 12/4  -Wean PEEP to 12  -CXR  -Wean FiO2 to keep sats >92 and <95%  -Dexamethasone (12/3-12/12)  -Remdesivir (12/3-12/7)  -Full dose flolan  -Abx as below     Ventilation Mode: CMV/AC  (Continuous Mandatory Ventilation/ Assist Control)  FiO2 (%): 70 %  Rate Set (breaths/minute): 20 breaths/min  Tidal Volume Set (mL): 440 mL  PEEP (cm H2O): 5 cmH2O  Oxygen Concentration (%): 70 %  Resp:  20    Cardiovascular:  # Septic shock  Suspect sepsis and component of hypovolemia which is further worsened by his low CI/CO - suspect hypovolemic so will volume resus to improve pre-load. Considering obstructive given tachycardia, worsening respiratory status, new R sided heart failure and evidence of hypercoagulable state.   - Lower extremity US without evidence of DVT  - 250 mL LR bolus today, reassess afterwards as may need additional boluses  - Goal CVP 10-12  - Levophed gtt to keep MAPs >65      # HFrEF 2/2 NICM s/p 3 LVAD as BTT on 12/5/2018, listed as status 7  # Biventricular heart failure  # s/p single chamber ICD (4/2017)  TTE 12/4 revealed severe TR, increased RV size and severely reduced global RV function. LVEF <20%, normal LV size. Dry wt ~ 220-224. 225lb on admission.   -Cardiology following, greatly appreciate assistance  -Goal to decrease PEEP to minimal amount to decrease RV stress  -Hold metoprolol given new biventricular heart failure  -Holding spironolactone, losartan, bumex given acute HD decompensation.  -Trend INR  -Plan to restart high intensity heparin gtt once INR <3     # Outflow graft obstruction  US 12/3 and CT C/A/P shows non-occlusive thrombus throughout entirety of the LVAD outflow cannula with ~50% stenosis.   -CT surgery consulted   - Expectant management for outflow graft obstruction, to be considered for potential transplant after COVID 19     # Persistent atrial tachycardia  # H/o NSVT  S/P IV amiodarone.  -Continue PO amiodarone for atrial tachycardia, no need to aggressively rate control given presence of LVAD, per cards  -Hold PO metoprolol   -Cardiology following, appreciate aid     # Lactic acidosis, improving  Likely in the setting of hypoperfusion from hypoxemia/hypotension. 5.5>2.3. Further volume resuscitation ongoing.      GI/Nutrition:  #Nutrition  -FT needs to be placed  -Nutrition consult    # Mesenteric thrombus  CT 12/4 revealed small amount of nonocclusive  thrombus within the proximal.     # Elevated LFTs  # Elevated lipase  Lipase elevated to 1262 however, did not repeat imaging and patient is currently NPO so unable to diagnose pancreatitis which could explain patient's septic picture. Will continue with early TF tomorrow. AST//148 - could be 2/2 DILI v. Component of hypoperfusion d/t hypotension overnight.   - trend LFTs     Renal/Fluids/Electrolytes:  # Non-oliguric CRUZ  Baseline sCr 1.1-1.2>1.59 upon admission to ICU. ABG with noted acidosis s/p 1 amp of bicarb, no acute electrolyte abnormalities, no hypervolemia, no uremia. Renal US without evidence of hydro and normal kidneys. CVP low at 5 suggesting hypovolemia,  FENa 0.2 suggesting pre-renal, and UA with hyaline casts further pointing to hypovolemia.   -Renal consult, appreciate recs  - >2L UOP today after bumex 2mg overnight  - goal net even to net negative 1L  -Avoid nephrotoxins  -Strict I/Os  -Daily weight     Endocrine:  # History of DM  No meds PTA. Last A1c 5.9%.  -Medium sliding scale insulin     ID:  # Severe sepsis  # C/f LVAD driveline infection  Febrile to 38.1C this AM, leukocytosis 10>18.4 overnight, ANC 16.1, new pressor requirement.   -LFTs and lipase  -LE doppler negative for DVT  -CXR  -Consider CT A/P 12/6    Antimicrobials:  -Ceftriaxone (12/3-12/5)  -Doxycycline (12/3-*)  -Vancomycin (12/5-* )  -Zosyn (12/5-* )     Micro:  LVAD driveline exit site (12/3, 12/5): Culture in progress.  Blood cx (12/3): NGTD  Blood cx (12/5):  Sputum:      Hematology:    # Supratherapeutic anticoagulation 2/2 LVAD   # COVID ppx  Holding warfarin and heparin gtt given INR 5.8. TEG with increased clot strength.    - INR and LDH trend   - once INR <3, would start high intensity heparin gtt    Musculoskeletal:  #Gout  - continue PTA allopurinol  - HOLD PTA colchicine     Skin:  #No acute concerns     General Cares/Prophylaxis:    DVT Prophylaxis: Pneumatic Compression Devices  GI Prophylaxis:  PPI  Restraints: None.  Family Communication: Updated by phone.  Code Status: Full code.     Lines/tubes/drains:  - ETT, brown, NG, A line, CVC.     Disposition:  - Medical ICU.     Patient seen and findings/plan discussed with medical ICU staff, Dr. Johnson.    Everardo Crystal    ====================================  INTERVAL HISTORY:   Febrile, hypotensive overnight.     OBJECTIVE:   1. VITAL SIGNS:   Temp:  [97.7  F (36.5  C)-98.8  F (37.1  C)] 98.8  F (37.1  C)  Pulse:  [] 144  Resp:  [20-48] 20  BP: ()/(38-86) 84/67  MAP:  [60 mmHg-82 mmHg] 74 mmHg  Arterial Line BP: (60-95)/(46-74) 79/69  FiO2 (%):  [70 %-100 %] 70 %  SpO2:  [82 %-100 %] 98 %  Ventilation Mode: CMV/AC  (Continuous Mandatory Ventilation/ Assist Control)  FiO2 (%): 70 %  Rate Set (breaths/minute): 20 breaths/min  Tidal Volume Set (mL): 440 mL  PEEP (cm H2O): 5 cmH2O  Oxygen Concentration (%): 70 %  Resp: 20    2. INTAKE/ OUTPUT:   I/O last 3 completed shifts:  In: 1263.44 [P.O.:120; I.V.:343.44; NG/GT:50; IV Piggyback:750]  Out: 1745 [Urine:1745]    3. PHYSICAL EXAMINATION:  General: Sedated, male who appears stated age  HEENT: ETT and OG in place  Neuro: Sedated, opens eyes to voice, does not track or follow commands  Pulm/Resp: Clear breath sounds bilaterally without rhonchi, crackles or wheeze, breathing non-labored, synchronous to ventilator  CV: Tachycardic regular heart sounds, murmur from LVAD  Abdomen: Soft, non-distended, non-tender.  : brown catheter in place, urine yellow and clear, minimal output.  Incisions/Skin: cool extremities, delayed capillary refill.    4. LABS:   Arterial Blood Gases   Recent Labs   Lab 12/05/20  0749 12/05/20  0419 12/05/20  0054 12/04/20  2131   PH 7.38 7.38 7.35 7.27*   PCO2 39 36 38 41   PO2 100 109* 156* 111*   HCO3 23 21 21 19*     Complete Blood Count   Recent Labs   Lab 12/05/20  0404 12/04/20  0540 12/03/20  1108   WBC 18.4* 10.6 9.7   HGB 15.9 16.7 15.8   * 323 251     Basic  Metabolic Panel  Recent Labs   Lab 12/05/20  0419 12/05/20  0404 12/04/20  2103 12/04/20  0540 12/03/20  2348   NA  --  139 142 138 135   POTASSIUM  --  4.4 3.8 4.1 4.3   CHLORIDE  --  109 110* 110* 108   CO2  --  21 20 18* 19*   BUN  --  22 19 11 12   CR  --  1.45* 1.59* 0.97 1.07   * 169* 180* 126* 159*     Liver Function Tests  Recent Labs   Lab 12/05/20  0404 12/04/20  2136 12/04/20  2103 12/04/20  0540 12/03/20  1108   AST  --   --  163*  --  39   ALT  --   --  47  --  27   ALKPHOS  --   --  130  --  124   BILITOTAL  --   --  1.1  --  1.2   ALBUMIN  --   --  2.4*  --  2.6*   INR 5.76* 6.63*  --  5.32* 4.25*     Coagulation Profile  Recent Labs   Lab 12/05/20  0404 12/04/20  2136 12/04/20  0540 12/03/20  1108   INR 5.76* 6.63* 5.32* 4.25*       5. RADIOLOGY:   Recent Results (from the past 24 hour(s))   XR Chest Port 1 View    Narrative    EXAM: XR CHEST PORT 1 VW  12/4/2020 6:57 PM     HISTORY:  ett placement       COMPARISON:  12/3/2017, CT 12/4/2020    FINDINGS: Single view of the chest. Endotracheal tube tip projects  over the low thoracic trachea 0.9 cm from the salomón. Left hemithorax  cardiac device. Partially visualized LVAD. Partially visualized  enteric tube. Median sternotomy wires.  Slightly increased diffuse  mixed lung opacities. The left costophrenic sulcus is collimated  outside the field of view. Small right pleural effusion. Low lung  volumes. The cardiomediastinal silhouette is obscured.      Impression    IMPRESSION:   1. Endotracheal tube tip projects over the low thoracic trachea, 0.9  cm from the salomón. Consider slight retraction.  2. Increased mixed lung opacities concerning for worsening infection.    I have personally reviewed the examination and initial interpretation  and I agree with the findings.    JONATHAN MEYERS MD   XR Abdomen Port 1 View    Narrative    EXAM: XR ABDOMEN PORT 1 VW  12/4/2020 6:57 PM      HISTORY: OG placement    COMPARISON: CT 12/3/2020    FINDINGS:  Enteric tube sidehole projects expected location of the  stomach. LVAD. Partially visualized cardiac leads. Diffuse lung  opacities persist.    Nonobstructive bowel gas pattern. No pneumatosis. No portal venous  gas.       Impression    IMPRESSION:   1. Enteric tube sidehole projects at the expected location of the  stomach.  2. Nonobstructive bowel gas pattern.  3. Partially visualized diffuse mixed lung opacities better assessed  on same day radiograph.    I have personally reviewed the examination and initial interpretation  and I agree with the findings.    JONATHAN MEYERS MD   Echo Complete    Narrative    [id number]  [id number]  [id number]  [id number]^SHIRLENE^MOHAN           [facility],[place]  Echocardiography Laboratory  [address] [id number]     Name: JUNA SHEN  MRN: [id number]  : 1955  Study Date: 2020 07:49 PM  Age: 65 yrs  Gender: Male  Patient Location: [facility] [place]  Reason For Study: Elevated troponin  Ordering Physician: MOHAN GARBER  Performed By: Mary Soliz RDCS     BSA: 2.2 m2  Height: 70 in  Weight: 227 lb  HR: 132  BP: 84/67 mmHg  _____________________________________________________________________________  __        Procedure  LVAD Echocardiogram with two-dimensional, color and spectral Doppler  performed. Contrast Optison. Optison (NDC #0904-5159-82) given intravenously.  Patient was given 6 ml mixture of 3 ml Optison and 6 ml saline. 3 ml wasted.  The final echo results were communicated to Dr. Chung. The final echo results  were communicated to the ordering physician by phone .  _____________________________________________________________________________  __        Interpretation Summary  HeartMate 3 LVAD at 5500 RPM.  Normal LV size with severely reduced global LV function, LVEF<20%. LVIDd=5.1  cm.  Moderately dilated RV with severely reduced global RV function.  The ventricular septum is midline.  The aortic valve remains  closed. There is mild continuous AI.  Severe TR with systolic flow reversal in the hepatic veins.  LVAD inflow cannula is visualized in the LV apex. LVAD outflow graft is  visualized in the aorta. Cannula velocities cannot be assessed.     This study was compared with the study from 12/16/19: LV size has decreased.  While RV function was likely moderately reduced on the prior study, RV  function appears to have decreased on today's study and RV size has increased.  Severe TR is new.  _____________________________________________________________________________  __        Left Ventricle  Left ventricular size is normal. Left ventricular wall thickness is normal.  Severely (EF <30%) reduced left ventricular function is present. Diastolic  function not assessed due to presence of LVAD. LVAD cannula was seen in the  expected anatomic position in the LV apex.     Right Ventricle  Moderate right ventricular dilation is present. Global right ventricular  function is severely reduced. A pacemaker lead is noted in the right  ventricle.     Atria  The right atrium appears enlarged on limited views. The left atrium cannot be  assessed.     Mitral Valve  The mitral valve is normal.        Aortic Valve  The aortic valve remains closed. There is mild continuous AI.     Tricuspid Valve  Severe tricuspid insufficiency is present. PA pressure cannot be assessed due  to severe TR.     Pulmonic Valve  The pulmonic valve cannot be assessed.     Vessels  The aorta root is normal. The thoracic aorta is normal. The pulmonary artery  cannot be assessed. Dilation of the inferior vena cava is present with  abnormal respiratory variation in diameter. Unable to assess mean RA pressure  given the patient is on a ventilator. Hepatic vein flow consistent with severe  tricuspid insufficiency is present.     Pericardium  No pericardial effusion is present.        Compared to Previous Study  This study was compared with the study from 12/16/19 . LV  size has decreased.  While RV function was likely moderately reduced on the prior study, RV  function appears to have decreased on today's study and RV size has increased.  Severe TR is new.  _____________________________________________________________________________  __  MMode/2D Measurements & Calculations     LVIDd: 5.1 cm        Doppler Measurements & Calculations  TR max jose: 229.0 cm/sec  TR max P.0 mmHg     _____________________________________________________________________________  __           Report approved by: Baron Bello 2020 08:54 PM      XR Chest Port 1 View    Impression    IMPRESSION:   1. Left IJ CVC tip projects over the low SVC.  2. Endotracheal tube projects 2.0 cm above the salomón.  3. Decreased mixed pulmonary opacities, concerning for infection.  4. Small right pleural effusion.

## 2020-12-05 NOTE — PROCEDURES
Diagnosis:    1)  Acute respiratory failure secondary to COVID-19   2)  Biventricular heart failure with LVAD in place     Procedure:  Left internal jugular CVL    Date:  12/04/20    Description:  Informed consent was obtained.  The left neck was prepped and draped in the usual sterile fashion and anesthetized with Lidocaine.  Using US guidance, the left internal jugular was cannulated using Seldinger technique.  A dilator was advanced followed by insertion of the catheter over the wire.  The catheter was then secured with suture.  All ports flushed and gabriela.  A sterile dressing was applied.  CXR was used to confirm placement.  Patient tolerated the procedure without complications.        Delfino Shaw MD on 12/4/2020 at 10:53 PM

## 2020-12-05 NOTE — PHARMACY-VANCOMYCIN DOSING SERVICE
Pharmacy Vancomycin Initial Note  Date of Service 2020  Patient's  1955  65 year old, male    Indication: Sepsis    Current estimated CrCl = Estimated Creatinine Clearance: 62 mL/min (A) (based on SCr of 1.45 mg/dL (H)).    Creatinine for last 3 days  12/3/2020: 11:08 AM Creatinine 1.18 mg/dL; 11:48 PM Creatinine 1.07 mg/dL  2020:  5:40 AM Creatinine 0.97 mg/dL;  9:03 PM Creatinine 1.59 mg/dL  2020:  4:04 AM Creatinine 1.45 mg/dL    Recent Vancomycin Level(s) for last 3 days  No results found for requested labs within last 72 hours.      Vancomycin IV Administrations (past 72 hours)      No vancomycin orders with administrations in past 72 hours.                Nephrotoxins and other renal medications (From now, onward)    Start     Dose/Rate Route Frequency Ordered Stop    20 1300  vancomycin (VANCOCIN) 1,750 mg in sodium chloride 0.9 % 250 mL intermittent infusion      1,750 mg (central catheter)  over 60 Minutes Intravenous EVERY 24 HOURS 20 1226      20 1230  piperacillin-tazobactam (ZOSYN) 4.5 g vial to attach to  mL bag      4.5 g  over 30 Minutes Intravenous EVERY 6 HOURS 20 1217      20 1230  vancomycin (VANCOCIN) 2,500 mg in sodium chloride 0.9 % 250 mL intermittent infusion      2,500 mg (central catheter)  over 60 Minutes Intravenous ONCE 20 1226      20 1900  norepinephrine (LEVOPHED) 16 mg in  mL infusion CENTRAL LINE      0.03-0.4 mcg/kg/min × 103.2 kg  2.9-38.7 mL/hr  Intravenous CONTINUOUS 20 1830            Contrast Orders - past 72 hours (72h ago, onward)    Start     Dose/Rate Route Frequency Ordered Stop    20  perflutren diluted 1mL to 2mL with saline (OPTISON) diluted injection 6 mL      6 mL Intravenous ONCE 20 0100  iopamidol (ISOVUE-370) solution 100 mL      100 mL Intravenous ONCE 20 0039 20 0121    20  iopamidol (ISOVUE-370)  solution 135 mL      135 mL Intravenous ONCE 12/03/20 1957 12/03/20 2032                Plan:  1.  Start vancomycin  2500mg IV x1, then 2000 mg IV q24h 20mg/kg adjBW; q24h given elevated baseline SCr & age despite CrCl=60).   2.  Goal Trough Level: 15-20 mg/L   3.  Pharmacy will check trough levels as appropriate in 1-3 Days.    4. Serum creatinine levels will be ordered daily for the first week of therapy and at least twice weekly for subsequent weeks.    5. Dawn method utilized to dose vancomycin therapy: Method 2    Yvette Watts, [facility]

## 2020-12-05 NOTE — PROGRESS NOTES
Admitted/transferred from:    Reason for admission/transfer: Intubation, possible pressors  Patient status upon admission/transfer: stable once intubated  Interventions: patient intubated, art line place, evaluate for further Central line placement  Plan: monitor in ICU.  2 RN skin assessment: completed by Kane SMITH RN, Doc VIZCARRA RN  Result of skin assessment and interventions/actions: no areas of concern.   Height, weight, drug calc weight: done  Patient belongings: belongings placed in closet.   MDRO education (if applicable): NA

## 2020-12-05 NOTE — PROGRESS NOTES
CLINICAL NUTRITION SERVICES - ASSESSMENT NOTE     Nutrition Prescription    RECOMMENDATIONS FOR MDs/PROVIDERS TO ORDER:  1. Ongoing Lyte Monitor with TF start and advancement , replace as needed   2. Monitor BG and adjust insulin with TF start and advancement    Malnutrition Status:    Unable to determine due to unable to perform all aspects of NFPE    Recommendations already ordered by Registered Dietitian (RD):  1. Enteral Nutrition support:  --Access: OG tube per RN  --Dosing wt: 82 kg (adjusted wt from admit wt 101.7 kg and IBW 75.5 kg).    --Ordered Nutren 1.5, Initiate @ 10 ml/hr and advance by 10 ml Q 8hr as tolerated to goal @ 50 ml/hr.  Do not start or advance until lytes (Mg++,K+) WNL and phos>1.9    Nutren 1.5 @ 50 mL/hr to provide 1800 kcals ( 22 kcal/kg/day), 82 g PRO (1.0 g/kg/day), 912 mL H2O, 211 g CHO and no fiber daily.    --Ordered Prosource protein supplement 1 packet BID ( 80 kcal , 22 gm protein)  - Ensure HOB > 30 degrees with gastric feeding   - 30 ml q4hr fluid flushes for tube patency.   - -Certavite 15 ml/day via FT-> unable to order due to med/med interaction   - Ordered daily check of K+, Mg, Phos until TF advances to goal rate for evaluation of refeeding syndrome and need for Lyte replacement.    Future/Additional Recommendations:  TF tolerance        REASON FOR ASSESSMENT  Danielito Chu is a/an 65 year old male assessed by the dietitian for Provider Order - Registered Dietitian to Assess and Order TF per Medical Nutrition Therapy Protocol    Chart reviewed:  --Admitted for possible intubation/pressors, +COVID-19 infection  Presented with SOB, On LVAD   -HM3 LVAD (12/2018) bridge to heart transplant, NICM, (LVEF at dong of 15%) s/p ICD (4/2017), DM2, CKD II, gout    --Admitted on 12/3/2020 for evaluation of fever, dyspnea and weakness and loss of appetite and transfer from [place] ED for ARHF 2/2 COVID pneumonia and possible LVAD drive line infection.     NUTRITION  "HISTORY  Unable to obtain. Patient is intubated/sedated, Patient was not seen due to Covid + status.  The chart was reviewed    CURRENT NUTRITION ORDERS  Diet: NPO  Intake/Tolerance: per above     LABS  K+:4.4, Mg++: Phos:  BUN:22, Cr:1.45 (H)  CRP: 160 (H), LDH: 995 (H), WBC: 18.4     MEDICATIONS  Insulin, B1,   Dexamethasone, Remdesvir    ANTHROPOMETRICS  Height: 177.8 cm (5' 10\")  Most Recent Weight: 101.7 kg (224 lb 3.3 oz) admit wt on 12/3/20  IBW: 75.5 kg ( 135% )  BMI: Obesity Grade I BMI 30-34.9  Weight History:   Wt Readings from Last 10 Encounters:   12/04/20 103.2 kg (227 lb 8.2 oz)   07/02/20 107.5 kg (237 lb)   03/04/20 108.7 kg (239 lb 11.2 oz)   12/06/19 108.4 kg (239 lb)   09/10/19 109.8 kg (242 lb)   08/19/19 109.5 kg (241 lb 8 oz)   06/07/19 103.4 kg (228 lb)   04/22/19 100.5 kg (221 lb 8 oz)   04/22/19 99.8 kg (220 lb)   03/22/19 93.9 kg (207 lb)       Dosing Weight: 82 kg (adjusted wt from admit wt 101.7 kg and IBW 75.5 kg).     ASSESSED NUTRITION NEEDS  Estimated Energy Needs: 2350-8058  kcals/day (20 - 25 kcals/kg)  Justification: Maintenance  Estimated Protein Needs:   grams protein/day (1.2 - 1.5 grams of pro/kg)  Justification: Hypercatabolism with acute illness  Estimated Fluid Needs: Justification: Per provider pending fluid status    PHYSICAL FINDINGS  See malnutrition section below.    MALNUTRITION  % Intake: NPO 1-2 days since admit   % Weight Loss: None noted  Subcutaneous Fat Loss: Unable to assess - Covid +   Muscle Loss: Unable to assess, Covid +  Fluid Accumulation/Edema: Mild (1+)  Malnutrition Diagnosis: Unable to determine due to unable to perform all aspects of NFPE    NUTRITION DIAGNOSIS  Inadequate oral intake related to acute hypoxic respiratory failure secondary to Covid-19 infection, inhibiting ability to take PO as evidenced by NPO status, plan to start TF support     INTERVENTIONS  Implementation  Nutrition Education: Not appropriate at this time due to patient " condition   Enteral Nutrition - Initiate  Feeding tube flush     Goals  Total avg nutritional intake to meet a minimum of 20 kcal/kg and 1.2 gm PRO/kg daily (per dosing wt 82 kg adjusted wt ).     Monitoring/Evaluation  Progress toward goals will be monitored and evaluated per protocol.    Torres Mcarthur RD/MISHA  Weekend Pager [phone]

## 2020-12-05 NOTE — PROCEDURES
Diagnosis:    1)  Acute respiratory failure secondary to COVID-19   2)  Left ventricular failure with LVAD in place     Procedure:  Right radial arterial line placement    Date:  12/04/20    Description:  Informed consent was obtained.  The right wrist was prepped and draped in a sterile fashion.  Under US guidance, the right radial artery was cannulated using Seldinger technique.  The catheter had pulsatile flow and transduced a good arterial waveform.  The catheter was secured with suture.  The patient tolerated the procedure without complications.       Delfino Shaw MD on 12/4/2020 at 7:42 PM

## 2020-12-06 NOTE — PROGRESS NOTES
MEDICAL ICU PROGRESS NOTE  12/06/2020      Date of Service (when I saw the patient): 12/06/2020    ASSESSMENT: 64yo M with HM3 LVAD (12/2018) bridge to heart transplant, NICM, (LVEF at dong of 15%) s/p ICD (4/2017), DM2, CKD II, gout, GERD who was initially admitted to the ICU at [facility] on 12/3/2020 for evaluation of  fever, dyspnea and weakness and loss of appetite and transfer from [place] ED for ARHF 2/2 COVID pneumonia 12/2, transferred to floor 12/3, who acutely decompensated with hypoxic respiratory failure requiring intubation 12/4, acute on chronic heart failure on LVAD, new oliguria, septic shock and LVAD thrombus. Course c/b acutely and markedly elevated troponin.    CHANGES and MAJOR THINGS TODAY:   - TTE, discuss potential interventions for NSTEMI  - Stop remdesivir given LFTs  - RUQ U/S -- no evidence cholecystitis  - Repeat lipase, now wnl  - Will consider stopping vanco tomorrow pending cultures  - Decrease PEEP to 10 in attempt to further reduce RV stress  - Keep net even to mildly positive today    PLAN:     Neuro:  # Pain and sedation  Analgesia: Fentanyl gtt + PRN pushes  Sedation: Precedex gtt  - RASS goal -1/-2     # Adjustment disorder  Evaluated by psychiatry on 12/4, patient expressed anxiety about upcoming procedures. Declined acute treatment.  - Pending health psychology consult, deferred until extubated and alert.     Pulmonary:  # Acute hypoxemic respiratory failure 2/2 Severe COVID-19 infection c/b severe ARDS  Transfered to [facility] on 12/3 oxymask 6L sating 96% and was transferred to the floor. On 12/4 acutely decompensated requiring mechanical ventilation. CXR showing bilateral worsening infiltrates.   -Intubated 12/4  -Decrease PEEP to 10  -CXR today (captured with low lung volumes, difficult to fully assess)  -Wean FiO2 to keep sats >92 and <95%  -Dexamethasone (12/3-12/12*)  -STOP Remdesivir (12/3-12/5) given transaminitis  -Full dose flolan  -Abx as below     Ventilation Mode: CMV/AC   (Continuous Mandatory Ventilation/ Assist Control)  FiO2 (%): 60 %  Rate Set (breaths/minute): 20 breaths/min  Tidal Volume Set (mL): 440 mL  PEEP (cm H2O): 12 cmH2O  Oxygen Concentration (%): 60 %  Resp: 20    Cardiovascular:  # Septic shock  Suspect sepsis and component of hypovolemia which is further worsened by his low CI/CO - suspect hypovolemic so will volume resus to improve pre-load. Considering obstructive given tachycardia, worsening respiratory status, new R sided heart failure and evidence of hypercoagulable state.   - Lower extremity US without evidence of DVT  - Goal net even or mildly net negative  - Goal CVP 10-12  - Levophed gtt to keep MAPs >65    # ?Stress cardiomyopathy  # Elevated troponin  Markedly elevated troponin to >20 on 12/5. TTE with poor evaluation of R side, L side unchanged.   - Discuss with Cards, may need to restart heparin      # HFrEF 2/2 NICM s/p 3 LVAD as BTT on 12/5/2018, listed as status 7  # Biventricular heart failure  # s/p single chamber ICD (4/2017)  TTE 12/4 revealed severe TR, increased RV size and severely reduced global RV function. LVEF <20%, normal LV size. Dry wt ~ 220-224. 225lb on admission.   -Cardiology following, greatly appreciate assistance  -Goal to decrease PEEP to minimal amount to decrease RV stress (reduced to 10)  -Hold metoprolol given new biventricular heart failure  -Holding spironolactone, losartan, bumex given acute HD decompensation.  -Trend INR  -Plan to restart high intensity heparin gtt once INR <3     # Outflow graft obstruction  US 12/3 and CT C/A/P shows non-occlusive thrombus throughout entirety of the LVAD outflow cannula with ~50% stenosis.   - CT surgery consulted: expectant management for outflow graft obstruction, to be considered for potential transplant after COVID 19     # Persistent atrial tachycardia  # H/o NSVT  S/P IV amiodarone.  -Continue PO amiodarone for atrial tachycardia, no need to aggressively rate control given  presence of LVAD, per cards  -Hold PO metoprolol   -Cardiology following, appreciate aid     # Lactic acidosis, improving  Likely in the setting of hypoperfusion from hypoxemia/hypotension. 5.5>2.3. Further volume resuscitation ongoing.      GI/Nutrition:  # Nutrition  -FT needs to be placed  -Nutrition consult    # Mesenteric thrombus  CT 12/4 revealed small amount of nonocclusive thrombus within the proximal.     # Elevated LFTs  # Elevated lipase  Lipase elevated to 1262 however, but then normal on following measure. Feel at least one is spurious. Will continue with early TF tomorrow. AST//148 and worsening - could be 2/2 DILI (e.g. remdesivir) v. Component of hypoperfusion.   - trend LFTs  - stop remdesivir  - repeat lipase in AM  - s/p RUQ U/S (no cholecystitis)     Renal/Fluids/Electrolytes:  # Non-oliguric CRUZ, resolving  Baseline sCr 1.1-1.2>1.59 upon admission to ICU. ABG with noted acidosis s/p 1 amp of bicarb, no acute electrolyte abnormalities, no hypervolemia, no uremia. Renal US without evidence of hydro and normal kidneys. CVP low at 5 suggesting hypovolemia,  FENa 0.2 suggesting pre-renal, and UA with hyaline casts further pointing to hypovolemia.   -Renal consult, appreciate recs  -Goal net even to mildly positive given c/f RV failure and need for preload  -Hold diuresis today unless markedly positive  -Avoid nephrotoxins  -Strict I/Os  -Daily weight     Endocrine:  # History of DM  No meds PTA. Last A1c 5.9%.  -Medium sliding scale insulin     ID:  # Severe sepsis  # C/f LVAD driveline infection  Febrile to 38.1C this AM, leukocytosis 10>18.4 overnight, ANC 16.1, new pressor requirement. Has remained with some evidence of sepsis (febrile, remains on levophed), but workup unrevealing thus far and leukocytosis improving.  - On vanc and zosyn -- will consider stopping vanc tomorrow if no GPC on cultures  - Cont doxycycline    Antimicrobials:  -Ceftriaxone (12/3-12/5)  -Doxycycline  (12/3-*)  -Vancomycin (12/5-* )  -Zosyn (12/5-* )     Micro:  LVAD driveline exit site (12/3, 12/5): Culture in progress.  Blood cx (12/3): NGTD  Blood cx (12/5): NGTD  Sputum: unable to collect thus far     Hematology:    # Supratherapeutic anticoagulation 2/2 LVAD   # COVID ppx  Holding warfarin and heparin gtt given INR 5.8. TEG with increased clot strength.    - INR and LDH trend   - once INR <3, would start high intensity heparin gtt   - will discuss with cards if elevated troponin would lead to higher threshold for heparin    Musculoskeletal:  # Gout  - continue PTA allopurinol  - HOLD PTA colchicine     Skin:  # No acute concerns     General Cares/Prophylaxis:    DVT Prophylaxis: Pneumatic Compression Devices  GI Prophylaxis: PPI  Restraints: None.  Family Communication: Updated by phone.  Code Status: Full code.     Lines/tubes/drains:  - ETT, brown, NG, A line, CVC.     Disposition:  - Medical ICU.     Patient seen and findings/plan discussed with medical ICU staff, Dr. Johnson.    Sukhdev Velez  Internal Medicine PGY-2  ====================================  INTERVAL HISTORY:   Rapid and persistent reduction in prior tachycardia and subsequent bradycardia. Gave small bolus (CVP was 8), no response. Troponin drawn and >20. Loaded with  and d/w cardiology, recommended stat TTE. Pressures have remained stable despite trop and reduced HR. No LVAD alarms. Unclear etiology and precipitating event.    OBJECTIVE:   1. VITAL SIGNS:   Temp:  [96.8  F (36  C)-102.9  F (39.4  C)] 97.9  F (36.6  C)  Pulse:  [] 51  Resp:  [20] 20  MAP:  [62 mmHg-97 mmHg] 77 mmHg  Arterial Line BP: ()/(43-84) 84/65  FiO2 (%):  [60 %-70 %] 60 %  SpO2:  [93 %-100 %] 95 %  Ventilation Mode: CMV/AC  (Continuous Mandatory Ventilation/ Assist Control)  FiO2 (%): 60 %  Rate Set (breaths/minute): 20 breaths/min  Tidal Volume Set (mL): 440 mL  PEEP (cm H2O): 12 cmH2O  Oxygen Concentration (%): 60 %  Resp: 20    2. INTAKE/  OUTPUT:   I/O last 3 completed shifts:  In: 2503.63 [I.V.:1553.63; NG/GT:290; IV Piggyback:500]  Out: 1965 [Urine:1965]    3. PHYSICAL EXAMINATION:  General: Sedated, male who appears stated age  HEENT: ETT and OG in place  Neuro: Sedated, opens eyes to voice, does not track or follow commands  Pulm/Resp: Clear breath sounds bilaterally without rhonchi, crackles or wheeze, breathing non-labored, synchronous to ventilator  CV: Tachycardic regular heart sounds, murmur from LVAD  Abdomen: Soft, non-distended, non-tender.  : brown catheter in place.  Ext: Cool, without significant edema  Incisions/Skin: delayed capillary refill.    4. LABS:   Arterial Blood Gases   Recent Labs   Lab 12/06/20  1151 12/06/20  0346 12/05/20  1446 12/05/20  0749   PH 7.45 7.44 7.38 7.38   PCO2 36 36 39 39   PO2 67* 80 101 100   HCO3 25 24 23 23     Complete Blood Count   Recent Labs   Lab 12/06/20  0353 12/05/20  1637 12/05/20  0404 12/04/20  0540 12/03/20  1108   WBC 13.0*  --  18.4* 10.6 9.7   HGB 14.8 15.0 15.9 16.7 15.8   *  --  503* 323 251     Basic Metabolic Panel  Recent Labs   Lab 12/06/20  0353 12/05/20  1637 12/05/20  0419 12/05/20  0404 12/04/20  2103    143  --  139 142   POTASSIUM 3.9 4.1  --  4.4 3.8   CHLORIDE 110* 113*  --  109 110*   CO2 23 21  --  21 20   BUN 29 25  --  22 19   CR 1.28* 1.37*  --  1.45* 1.59*   * 141* 166* 169* 180*     Liver Function Tests  Recent Labs   Lab 12/06/20  0353 12/05/20  0404 12/04/20  2136 12/04/20  2103 12/04/20  0540 12/03/20  1108   * 367*  --  163*  --  39   * 148*  --  47  --  27   ALKPHOS 112 131  --  130  --  124   BILITOTAL 1.8* 0.9  --  1.1  --  1.2   ALBUMIN 2.3* 2.6*  --  2.4*  --  2.6*   INR 5.29* 5.76* 6.63*  --  5.32* 4.25*     Coagulation Profile  Recent Labs   Lab 12/06/20  0353 12/05/20  0404 12/04/20  2136 12/04/20  0540   INR 5.29* 5.76* 6.63* 5.32*       5. RADIOLOGY:   Recent Results (from the past 24 hour(s))   US Lower Extremity  Venous Duplex Bilateral    Narrative    EXAMINATION: DOPPLER VENOUS ULTRASOUND OF BILATERAL LOWER EXTREMITIES,  2020 2:25 PM     COMPARISON: None.    HISTORY: Rule out DVT    TECHNIQUE:  Gray-scale evaluation with compression, spectral flow and  color Doppler assessment of the deep venous system of both legs from  groin to knee, and then at the ankles.    FINDINGS:  In both lower extremities, the common femoral, femoral, popliteal and  posterior tibial veins demonstrate normal compressibility and blood  flow. Duplicated left femoral veins.        Impression    IMPRESSION:  1.  No evidence of deep venous thrombosis in either lower extremity.       I have personally reviewed the examination and initial interpretation  and I agree with the findings.    JONATHAN MEYERS MD   XR Chest Port 1 View    Narrative    Exam: AP chest radiograph, 2020 9:36 AM    Indication: Follow-up    Comparison: 2020, 2020    Findings: AP chest radiograph. Endotracheal tube tip projects over the  midthoracic trachea. Left jugular CVC catheter tip overlying the SVC.  LVAD, single lead cardiac pacer. Low lung volumes. Bilateral mixed  interstitial and airspace opacities. No pneumothorax or pleural  effusion. No acute osseous or abdominal adenopathy.      Impression    IMPRESSION: Low lung volumes with persistent mixed interstitial  airspace opacities.    I have personally reviewed the examination and initial interpretation  and I agree with the findings.    COLLEEN SANDOVAL MD   Echocardiogram Complete    Narrative    [id number]  [id number]  [id number]  [id number]^RYANN^ROGER           [facility]  Echocardiography Laboratory  [address] [place] [id number]     Name: JUAN SHEN  MRN: [id number]  : 1955  Study Date: 2020 09:44 AM  Age: 65 yrs  Gender: Male  Patient Location: [facility] [place]  Reason For Study: Myocardial Infarction  Ordering Physician: RYANN  ROGER  Performed By: Lucero Buckner RDCS     BSA: 2.0 m2  Height: 70 in  Weight: 184 lb  HR: 48  BP: 111/66 mmHg  _____________________________________________________________________________  __        Procedure  Complete Portable Echo Adult. Technically difficult study.Extremely poor  acoustic windows. The final echo results were communicated to Dr. Ventura. The  final echo results were communicated to the ordering physician by phone .  _____________________________________________________________________________  __        Interpretation Summary  Technically difficult study.Extremely poor acoustic windows.     HeartMate 3 LVAD at 5500 RPM.  LV size is difficult to assess due to suboptimal, off-axis imaging but is  probably unchanged from 12/4. Severely reduced global LV function, LVEF<20%.  Severe diffuse hypokinesis, unchanged from prior studies.  RV size and function cannot be assessed.  The ventricular septum is midline.  The aortic valve remains closed. There is mild continuous AI.  LVAD inflow cannula is visualized in the LV apex. LVAD outflow graft is  visualized in the aorta. Normal Doppler interrogation of the LVAD inflow  cannula and outflow graft.  This study was compared with the study from 12/4/20: There has been no  signficant change. Specifically, LV size is probably unchanged and LV function  is unchanged and diffuse hypokinesis is unchanged; RV was poorly imaged on  today's study and cannot be compared.  _____________________________________________________________________________  __        Left Ventricle  LV size is difficult to assess due to suboptimal, off-axis imaging but is  probably unchanged from 12/4. Severely (EF <30%) reduced left ventricular  function is present. Diastolic function not assessed due to presence of LVAD.  Severe diffuse hypokinesis is present.     Right Ventricle  The right ventricle cannot be assessed.     Atria  The atria cannot be assessed.     Mitral  Valve  The mitral valve is normal.        Aortic Valve  The aortic valve remains closed. There is mild continuous AI.     Tricuspid Valve  The tricuspid valve cannot be assessed. The peak velocity of the tricuspid  regurgitant jet is not obtainable. Pulmonary artery systolic pressure cannot  be assessed.     Pulmonic Valve  The pulmonic valve cannot be assessed.     Vessels  The aorta root cannot be assessed. The thoracic aorta cannot be assessed. The  pulmonary artery cannot be assessed. The inferior vena cava cannot be  assessed.     Pericardium  No pericardial effusion is present.        Compared to Previous Study  This study was compared with the study from 12/4/20 . There has been no  signficant change. Specifically, LV size is probably unchanged and LV function  is unchanged and diffuse hypokinesis is unchanged; RV was poorly imaged on  today's study and cannot be compared.  _____________________________________________________________________________  __  MMode/2D Measurements & Calculations     IVSd: 0.91 cm  LVIDd: 5.9 cm  LVIDs: 5.9 cm  LVPWd: 0.97 cm  FS: 0.68 %  LV mass(C)d: 222.2 grams  LV mass(C)dI: 110.3 grams/m2  RWT: 0.33        Doppler Measurements & Calculations  PA acc time: 0.12 sec     _____________________________________________________________________________  __           Report approved by: Baron Bello 12/06/2020 11:19 AM      US Abdomen Limited    Narrative    ultrasound abdomen  12/6/2020 11:15 AM      History: Right upper quadrant. Worsening transaminitis.  Cholecystitis?.     COMPARISON: CT 12/3/20  FINDINGS:   Liver: Echogenic liver parenchyma measuring 16.3 cm. No focal mass.    Gallbladder: No cholelithiasis, pericholecystic fluid or gallbladder  wall thickening. Biliary sludge.    Biliary tree: The common bile duct measures 0.31 cm. No intrahepatic  biliary dilatation.    Right kidney: The right kidney measures 10.9 cm. No evidence of  hydronephrosis or focal mass.       Impression    IMPRESSION:   1. Hepatomegaly with echogenic liver parenchyma which can be seen with  intrinsic hepatic parenchyma disease.  2. Biliary sludge without evidence of cholelithiasis or cholecystitis.      I have personally reviewed the examination and initial interpretation  and I agree with the findings.    COLLEEN SANDOVAL MD

## 2020-12-06 NOTE — PROGRESS NOTES
MICU Progress Note 12-6-2020    MICU Staff      This patient has been seen and evaluated by me.  I have discussed care with the housestaff and agree with the findings and plan in their note.    65 year old male who was admitted on 12/4/2020 with  hypoxic respiratory failure from [facility] and remains critically ill with hypoxic respiratory failure, heart failure on LVAD, oliguria, septic shock  I personally examined and evaluated the patient today. The care plan was developed with the house staff team or resident(s) and I agree with the findings and plan in this note aside from any exceptions noted below.  I have reviewed and evaluated the vital signs, medications, laboratory values, imaging studies, and consults from the past 24 hours.    Hypoxic respiratory failure , intubated on Veletri. Peep at 14 - will wean down due to rightheart concerns. Try to decrease Peep to 10 today.    Heart failure on LVAD, RV dysfunction new. Heart failure cardiology following. Was tachycardic. Acutely became bradycardic overnight with significant troponin leak. Echo with severe diffuse hypokinesis without new wall motion abnl. Unable to assess RV. Await cardiology recommendations regarding heparin and acute change in cardiac status. On NE.     Drive line infection. ID and CVTS consulted. No surgical plan as of now. Remains on antibiotics. Cultures sent.    LVAD outflow graft clot. Elevated INR, TEG pending. Anticoagulation plan as per heart failure cardiology. Plan for anticoagulation with full intensity heparin when INR gets to around 2 per Cardiology recommendation.    LFTs increased. Check lipase and RUQ US.        Bruno Johnson MD  Pulmonary/Critical Care  December 6, 2020    CCT 30 minutes separate from procedures

## 2020-12-06 NOTE — CONSULTS
[facility]     Cardiology Progress Note- Cards 2        Date of Admission:  12/3/2020     Assessment & Plan: HVSL   Danielito Chu is a 65 year old male admitted on 12/3/2020 with COVID pneumonia. He has a history of NICM (LVEF at dong of 15%) s/p ICD (4/2017), who underwent HM3 LVAD implant on 12/5/18 as DT, now BTT. He has a history of atrial arrythmia, s/p ICD, on amiodarone and metoprolol. He was admitted as transfer to MICU from [place] ED for ARHF 2/2 COVID pneumonia. 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 Over the course of hospitalization, his respiratory status has progressively deteriorated and he was ultimately transferred back to the MICU and emergently intubated. He now has an elevated troponin (peak 24.782) without ischemic EKG changes, concerning for myocarditis>ACS/coronary embolism. Hemodynamics/LVAD parameters have otherwise remained stable with improving lactic acidosis and SvO2 of 66. He has also converted from atrial tachycardia to sinus bradycardia, possibly secondary to pharmacologic sedation. LFTs are increasing concerning for hepatic congestion vs remdesivir hepatic injury. Hepatic ultrasound consistent with prior known cirrhosis.    We are concerned that his supratherapuetic INR may not accurately reflect his coagulation state, particularly as he has multiple arterial clots. WE would recommend reversal of INR and starting of heparin in place, to allow better assessment and control of coagulability.       Today's Recommendations:  - Would give a single dose of vitamin K, start high-intensity heparin when INR <3, could start low-intensity heparin in the interim period  - trend troponin q6hr to peak  - would obtain daily mixed venous oxygen off of CVC  - check LDH isoenzymes to differentiate cardiac contribution  - hold amiodarone given bradycardia      #Covid Pneumonia  #Acute  hypoxic respiratory failure  - appreciate cares per primary medical team  - on dexamethasone  - remdesivir held 2/2 transaminitis     #COVID coagulopathy  #Supratherapuetic INR  - hold warfarin  - Would give a single dose of vitamin K, start high-intensity heparin when INR <3, could start low-intensity heparin in the interim period  - trend LDH  - check LDH isoenzymes to differentiate cardiac contribution     #Persistent atrial tachycardia  #Sinus Bradycardia  - stop amiodarone  - stop metoprolol     #NICM s/p LVAD 12/5/18  #Myocarditis  - ok to hold PTA spironolactone and losartan given tenuous hemodynamics  - ok to hold bumex given tenuous hemodynamics  - trend troponin q6hr to peak  - TTE without evidence of acute myocardial edema  - telemetry monitoring for ventricular dysrrythmias  - check LDH isoenzymes     #LVAD outflow thrombus  #LVAD driveline infection  - follow up CT surgery recommendations regarding   - no changes to LVAD parameters today  - cultures NGTD  - appreciate ID recommendations  - continue antibiotics  - Would give a single dose of vitamin K, start high-intensity heparin when INR <3, could start low-intensity heparin in the interim period    Entered: Chico Ventura MD 12/06/2020, 8:30 AM       The patient's care was discussed with the Attending Physician, Dr. Weems and Primary team.    Chico Ventura MD   Cardiology Fellow  Pager: [phone]  [facility] [place] [phone]  [facility]   Please see sign in/sign out for up to date coverage information  ______________________________________________________________________    Interval History   Overnight troponin elevated to 22. Patient went from tachycardia to sinus bradycardia. EKG shows sinus with no acute ischemic changes. Hemodynamics and LVAD parameters remain stable. Patient remains sedated and intubated.    Data reviewed today: I reviewed all medications, new labs and imaging results over the last 24  hours.    Physical Exam   Vital Signs: Temp: 96.8  F (36  C) Temp src: Axillary  Pulse: 52   Resp: 20 SpO2: 97 % O2 Device: Mechanical Ventilator    Weight: 184 lbs 11.93 oz  General Appearance: Sedated and intubated  Respiratory: intubated and mechanically ventilated, poor air movement over the bases, no crackles  Cardiovascular: LVAD hum  GI: soft, ND, hypoactive BS  Skin: warm, diaphoretic, no rash  Ext: WWP, trace edema  Neuro: sedated, no spontaneous movements     Data   Recent Labs   Lab 12/06/20  1412 12/06/20  0808 12/06/20  0353 12/05/20  1637 12/05/20  0419 12/05/20  0404 12/04/20  2136 12/04/20  0540 12/04/20  0540   WBC  --   --  13.0*  --   --  18.4*  --   --  10.6   HGB  --   --  14.8 15.0  --  15.9  --   --  16.7   MCV  --   --  97  --   --  97  --   --  97   PLT  --   --  470*  --   --  503*  --   --  323   INR  --   --  5.29*  --   --  5.76* 6.63*  --  5.32*   NA  --   --  143 143  --  139  --    < > 138   POTASSIUM  --   --  3.9 4.1  --  4.4  --    < > 4.1   CHLORIDE  --   --  110* 113*  --  109  --    < > 110*   CO2  --   --  23 21  --  21  --    < > 18*   BUN  --   --  29 25  --  22  --    < > 11   CR  --   --  1.28* 1.37*  --  1.45*  --    < > 0.97   ANIONGAP  --   --  10 8  --  9  --    < > 11   ASHLEE  --   --  8.6 8.3*  --  8.7  --    < > 9.0   GLC  --   --  162* 141* 166* 169*  --    < > 126*   ALBUMIN  --   --  2.3*  --   --  2.6*  --    < >  --    PROTTOTAL  --   --  6.2*  --   --  6.8  --    < >  --    BILITOTAL  --   --  1.8*  --   --  0.9  --    < >  --    ALKPHOS  --   --  112  --   --  131  --    < >  --    ALT  --   --  394*  --   --  148*  --    < >  --    AST  --   --  569*  --   --  367*  --    < >  --    LIPASE  --  116  --   --   --  1,262*  --   --   --    TROPI 22.609* 24.782* 21.830*  --   --   --   --    < > 0.475*    < > = values in this interval not displayed.     Recent Results (from the past 24 hour(s))   XR Chest Port 1 View    Narrative    Exam: AP chest radiograph,  2020 9:36 AM    Indication: Follow-up    Comparison: 2020, 2020    Findings: AP chest radiograph. Endotracheal tube tip projects over the  midthoracic trachea. Left jugular CVC catheter tip overlying the SVC.  LVAD, single lead cardiac pacer. Low lung volumes. Bilateral mixed  interstitial and airspace opacities. No pneumothorax or pleural  effusion. No acute osseous or abdominal adenopathy.      Impression    IMPRESSION: Low lung volumes with persistent mixed interstitial  airspace opacities.    I have personally reviewed the examination and initial interpretation  and I agree with the findings.    COLLEEN SANDOVAL MD   Echocardiogram Complete    Narrative    [id number]  [id number]  [id number]  [id number]^RYANN^ROGER           [facility],[place]  Echocardiography Laboratory  [address] [place] [id number]     Name: JUAN SHEN  MRN: [id number]  : 1955  Study Date: 2020 09:44 AM  Age: 65 yrs  Gender: Male  [place] [facility] [place]  Reason For Study: Myocardial Infarction  Ordering Physician: ROGER GARZON  Performed By: Lucero Buckner RDCS     BSA: 2.0 m2  Height: 70 in  Weight: 184 lb  HR: 48  BP: 111/66 mmHg  _____________________________________________________________________________  __        Procedure  Complete Portable Echo Adult. Technically difficult study.Extremely poor  acoustic windows. The final echo results were communicated to Dr. Ventura. The  final echo results were communicated to the ordering physician by phone .  _____________________________________________________________________________  __        Interpretation Summary  Technically difficult study.Extremely poor acoustic windows.     HeartMate 3 LVAD at 5500 RPM.  LV size is difficult to assess due to suboptimal, off-axis imaging but is  probably unchanged from . Severely reduced global LV function, LVEF<20%.  Severe diffuse hypokinesis, unchanged from prior  studies.  RV size and function cannot be assessed.  The ventricular septum is midline.  The aortic valve remains closed. There is mild continuous AI.  LVAD inflow cannula is visualized in the LV apex. LVAD outflow graft is  visualized in the aorta. Normal Doppler interrogation of the LVAD inflow  cannula and outflow graft.  This study was compared with the study from 12/4/20: There has been no  signficant change. Specifically, LV size is probably unchanged and LV function  is unchanged and diffuse hypokinesis is unchanged; RV was poorly imaged on  today's study and cannot be compared.  _____________________________________________________________________________  __        Left Ventricle  LV size is difficult to assess due to suboptimal, off-axis imaging but is  probably unchanged from 12/4. Severely (EF <30%) reduced left ventricular  function is present. Diastolic function not assessed due to presence of LVAD.  Severe diffuse hypokinesis is present.     Right Ventricle  The right ventricle cannot be assessed.     Atria  The atria cannot be assessed.     Mitral Valve  The mitral valve is normal.        Aortic Valve  The aortic valve remains closed. There is mild continuous AI.     Tricuspid Valve  The tricuspid valve cannot be assessed. The peak velocity of the tricuspid  regurgitant jet is not obtainable. Pulmonary artery systolic pressure cannot  be assessed.     Pulmonic Valve  The pulmonic valve cannot be assessed.     Vessels  The aorta root cannot be assessed. The thoracic aorta cannot be assessed. The  pulmonary artery cannot be assessed. The inferior vena cava cannot be  assessed.     Pericardium  No pericardial effusion is present.        Compared to Previous Study  This study was compared with the study from 12/4/20 . There has been no  signficant change. Specifically, LV size is probably unchanged and LV function  is unchanged and diffuse hypokinesis is unchanged; RV was poorly imaged on  today's study  and cannot be compared.  _____________________________________________________________________________  __  MMode/2D Measurements & Calculations     IVSd: 0.91 cm  LVIDd: 5.9 cm  LVIDs: 5.9 cm  LVPWd: 0.97 cm  FS: 0.68 %  LV mass(C)d: 222.2 grams  LV mass(C)dI: 110.3 grams/m2  RWT: 0.33        Doppler Measurements & Calculations  PA acc time: 0.12 sec     _____________________________________________________________________________  __           Report approved by: Baron Bello 12/06/2020 11:19 AM      US Abdomen Limited    Narrative    ultrasound abdomen  12/6/2020 11:15 AM      History: Right upper quadrant. Worsening transaminitis.  Cholecystitis?.     COMPARISON: CT 12/3/20  FINDINGS:   Liver: Echogenic liver parenchyma measuring 16.3 cm. No focal mass.    Gallbladder: No cholelithiasis, pericholecystic fluid or gallbladder  wall thickening. Biliary sludge.    Biliary tree: The common bile duct measures 0.31 cm. No intrahepatic  biliary dilatation.    Right kidney: The right kidney measures 10.9 cm. No evidence of  hydronephrosis or focal mass.      Impression    IMPRESSION:   1. Hepatomegaly with echogenic liver parenchyma which can be seen with  intrinsic hepatic parenchyma disease.  2. Biliary sludge without evidence of cholelithiasis or cholecystitis.      I have personally reviewed the examination and initial interpretation  and I agree with the findings.    COLLEEN SANDOVAL MD     Medications     dexmedetomidine 0.4 mcg/kg/hr (12/06/20 1600)     dextrose       epoprostenol (VELETRI) 20 mcg/mL in sterile water inhalation solution 20 ng/kg/min (12/06/20 1611)     fentaNYL 100 mcg/hr (12/06/20 1637)     HEParin       midazolam Stopped (12/05/20 0123)     norepinephrine 0.02 mcg/kg/min (12/06/20 1645)     - MEDICATION INSTRUCTIONS -       sodium chloride 75 mL/hr at 12/04/20 1457       allopurinol  200 mg Oral Daily     aspirin  81 mg Oral Daily     dexamethasone  6 mg Oral Q24H     doxycycline hyclate   100 mg Oral Q12H VITALY     insulin aspart  1-6 Units Subcutaneous Q4H     lidocaine  1 patch Transdermal Q24h    And     lidocaine   Transdermal Q8H     [Held by provider] losartan  25 mg Oral Daily     [Held by provider] metoprolol succinate ER  37.5 mg Oral Daily     pantoprazole  40 mg Oral QAM AC     piperacillin-tazobactam  4.5 g Intravenous Q6H     polyethylene glycol  17 g Oral Daily     protein modular  1 packet Per Feeding Tube BID     senna-docusate  1 tablet Oral At Bedtime     [Held by provider] spironolactone  37.5 mg Oral Daily     thiamine  100 mg Oral Daily     vancomycin (VANCOCIN) IV  1,750 mg (central catheter) Intravenous Q24H

## 2020-12-06 NOTE — PROGRESS NOTES
Nephrology Progress Note   December 6, 2020      Danielito Chu MRN:[id number] YOB: 1955  Date of Admission:12/3/2020  Primary care provider: Sapphire Stewart  Requesting physician: Ortega Garcia MD      ASSESSMENT AND RECOMMENDATIONS:     Danielito Chu is a 65 year old male with HM3 LVAD (12/2018) bridge to heart transplant, NICM, (LVEF at dong of 15%) s/p ICD (4/2017), DM2, CKD II, gout, GERD who was initially admitted to the ICU at [facility] on 12/3/2020 for evaluation of  fever, dyspnea and weakness and loss of appetite and transfer from [place] ED for ARHF 2/2 COVID pneumonia and possible LVAD drive line infection. Now with acute acute hypoxemic respiratory failure requiring mechanical ventilation and readmission to the ICU. Nephrology consulted for CRUZ       CRUZ secondary to sepsis related hypoperfusion resulting in ischemia .  Contrast associated nephropathy could be contributory to (contrast study on 12/3/2020)  Non Oliguric . UOP 2.5 Litre since MN   Cr improving  1.07 --> 1.59 ( 12/5) --> 1.28 ( 12/6)   FENA 0.2 -indicating towards prerenal mechanism  UA : Specific gravity 1.016, protein 30, blood: Moderate, , WBC 9.  Increased hyaline casts.  Nitrite negative, leukocyte Esterase negative.  With normal hemoglobin, increased platelets, TMA unlikely  AIN unlikely due to patient responding to resuscitative measures  CT chest abdomen pelvis: No hydronephrosis/mass  BP /volume : /72, HR 48 - BRADYCARDIC . Still on NE  0.03. CVP 10   Electrolytes : Lytes fine   Acid base : CO2 23, ph 7.45, pco2 36   No Anemia : Hb 15 --> 13.9   Antimicrobials : Doxy, Zosyn,Remdesivir,Vancomycin    Elevated troponin - being evaluated by primary team     Recommendations     1. No urgent indication for RRT . Will continue to closely monitor   2. Agree with volume resuscitation : Continue  Suggest to switch NS to LR @ 75 ml/hr   3. Hold cozaar, metoprolol, spironolactone   4. Daily  labs , daily weight , I/O       Recommendations were communicated to primary team via note  Patient seen and discussed with Dr Vikram Rodas MD, FACP  Nephrology Fellow   [facility]   Pager [phone]      INTERVAL HISTORY  Cr  Improving   Still on pressor  To maintian BP  sating 97% on 60 % fio2 ,unchanged  Urine output: 2.8 L.  Additional 635 mL since midnight.  Negative for 92 mm.  Since midnight positive to 72 mL.  Labs :   significant elevation in troponin  INR is 5.09  Echo pending  Ultrasound abdomen pending  Renal ultrasound, 12/5: Right kidney 10.4 cm.  Left kidney 11.5 cm.  Parenchyma normal.  No hydronephrosis.  IVF - On NS @ 75 ml/hr   Got 1.2 L LR yesterday      MEDICATIONS:  PTA Meds  Prior to Admission medications    Medication Sig Last Dose Taking? Auth Provider   allopurinol (ZYLOPRIM) 100 MG tablet Take 2 tablets (200 mg) by mouth daily   Melody Nassar MD   amiodarone (PACERONE) 200 MG tablet Take 1 tablet (200 mg) by mouth daily   Lorena Watkins MD   amoxicillin (AMOXIL) 500 MG capsule Take 4 capsules (2,000 mg) by mouth once as needed (Take 4 capsules (2000mg), one hour before any dental cleanings or procedures)   Lorena Watkins MD   aspirin (ASA) 81 MG chewable tablet Take 1 tablet (81 mg) by mouth daily   Derick Castellon PA   Blood Glucose Monitoring Suppl (BLOOD GLUCOSE MONITOR SYSTEM) w/Device KIT three times a week   Reported, Patient   bumetanide (BUMEX) 1 MG tablet Take 2 tablets (2 mg) by mouth 2 times daily   Ai Maravilla PA-C   colchicine (COLCRYS) 0.6 MG tablet One tab 0.6 mg on Mon/Wed/Fri x 1 month and if no gout flare up, stop it   Melody Nassar MD   enoxaparin (LOVENOX) 100 MG/ML syringe Inject 100 mg (1 ml) every 12 hours as directed by the Anticoagulation Clinic.  Patient not taking: Reported on 7/16/2020   Lorena Watkins MD   losartan (COZAAR) 25 MG tablet Take 1 tablet (25 mg) by mouth daily   June  Lorena Lindsey MD   metoprolol succinate ER (TOPROL XL) 25 MG 24 hr tablet Take 1.5 tablets (37.5 mg) by mouth daily   Ai Maravilla PA-C   potassium chloride ER (KLOR-CON) 10 MEQ CR tablet Take 2 tablets (20 mEq) by mouth 2 times daily   Lorena Watkins MD   spironolactone (ALDACTONE) 25 MG tablet TAKE 1.5 TABLETS (37.5 MG) BY MOUTH DAILY   Lorena Watkins MD   tacrolimus (PROTOPIC) 0.1 % external ointment Apply topically 2 times daily  Patient not taking: Reported on 7/16/2020   Moisés Krause MD   traZODone (DESYREL) 100 MG tablet TAKE 1 TABLET BY MOUTH EVERY DAY AT BEDTIME AS NEEDED FOR SLEEP  Patient not taking: Reported on 7/16/2020   Lorena Watkins MD   vitamin B1 (THIAMINE) 100 MG tablet Take 1 tablet (100 mg) by mouth daily  Patient not taking: Reported on 7/16/2020   Lorena Watkins MD   warfarin ANTICOAGULANT (COUMADIN) 2 MG tablet TAKE 1 & 1/2 TABLET BY MOUTH EACH DAY AT 6PM   Ashley Araujo MD      Current Meds    allopurinol  200 mg Oral Daily     amiodarone  200 mg Oral Daily     aspirin  81 mg Oral Daily     dexamethasone  6 mg Oral Q24H     doxycycline hyclate  100 mg Oral Q12H VITALY     insulin aspart  1-6 Units Subcutaneous Q4H     lidocaine  1 patch Transdermal Q24h    And     lidocaine   Transdermal Q8H     [Held by provider] losartan  25 mg Oral Daily     [Held by provider] metoprolol succinate ER  37.5 mg Oral Daily     pantoprazole  40 mg Oral QAM AC     piperacillin-tazobactam  4.5 g Intravenous Q6H     polyethylene glycol  17 g Oral Daily     protein modular  1 packet Per Feeding Tube BID     senna-docusate  1 tablet Oral At Bedtime     [Held by provider] spironolactone  37.5 mg Oral Daily     thiamine  100 mg Oral Daily     vancomycin (VANCOCIN) IV  1,750 mg (central catheter) Intravenous Q24H     Infusion Meds    dexmedetomidine 0.4 mcg/kg/hr (12/06/20 1000)     dextrose       epoprostenol (VELETRI) 20 mcg/mL in sterile water inhalation  "solution 20 ng/kg/min (20 1018)     fentaNYL 100 mcg/hr (20 1000)     midazolam Stopped (20 0123)     norepinephrine 0.03 mcg/kg/min (20 1000)     - MEDICATION INSTRUCTIONS -       sodium chloride 75 mL/hr at 20 1457       ALLERGIES:    No Known Allergies    REVIEW OF SYSTEMS:  Unable to - patient intubated     PHYSICAL EXAM:   Temp  Av.7  F (37.1  C)  Min: 96.8  F (36  C)  Max: 102.9  F (39.4  C)  Arterial Line BP  Min: 60/54  Max: 261/191  Arterial Line MAP (mmHg)  Av.4 mmHg  Min: 57 mmHg  Max: 158 mmHg      Pulse  Av.2  Min: 82  Max: 158 Resp  Av.9  Min: 11  Max: 48  FiO2 (%)  Av.1 %  Min: 70 %  Max: 100 %  SpO2  Av.4 %  Min: 82 %  Max: 100 %    CVP (mmHg): 10 mmHg  BP (!) 84/67   Pulse (!) 48   Temp 98.6  F (37  C) (Axillary)   Resp 20   Ht 1.778 m (5' 10\")   Wt 83.8 kg (184 lb 11.9 oz)   SpO2 97%   BMI 26.51 kg/m     Date 20 0700 - 20 0659   Shift 1182-1384 7953-0163 7240-6067 24 Hour Total   INTAKE   I.V. 751.8 294  1045.8   NG/   100   IV Piggyback  250  250   Shift Total(mL/kg) 851.8(8.25) 544(5.27)  1395.8(13.53)   OUTPUT   Urine 730 415  1145   Shift Total(mL/kg) 730(7.07) 415(4.02)  1145(11.1)   Weight (kg) 103.2 103.2 103.2 103.2      Admit Weight: 101.7 kg (224 lb 3.3 oz)   Exam deferred   LABS:   CMP  Recent Labs   Lab 20  0353 20  1637 20  0419 20  0404 20  2103 20  0540 20  1406 20  1406 20  1108    143  --  139 142 138   < >  --  137   POTASSIUM 3.9 4.1  --  4.4 3.8 4.1   < > 4.0 3.6   CHLORIDE 110* 113*  --  109 110* 110*   < >  --  108   CO2 23 21  --  21 20 18*   < >  --  20   ANIONGAP 10 8  --  9 13 11   < >  --  8   * 141* 166* 169* 180* 126*   < >  --  110*   BUN 29 25  --  22 19 11   < >  --  11   CR 1.28* 1.37*  --  1.45* 1.59* 0.97   < >  --  1.18   GFRESTIMATED 59* 54*  --  50* 45* 82   < >  --  65   GFRESTBLACK 68 62  --  58* 52* >90   < " >  --  75   ASHLEE 8.6 8.3*  --  8.7 8.6 9.0   < >  --  8.2*   MAG 2.5* 1.7  --   --   --  2.2  --  1.8 1.8   PHOS 2.8  --   --   --   --  2.8  --   --  2.2*   PROTTOTAL 6.2*  --   --  6.8 6.5*  --   --   --  6.6*   ALBUMIN 2.3*  --   --  2.6* 2.4*  --   --   --  2.6*   BILITOTAL 1.8*  --   --  0.9 1.1  --   --   --  1.2   ALKPHOS 112  --   --  131 130  --   --   --  124   *  --   --  367* 163*  --   --   --  39   *  --   --  148* 47  --   --   --  27    < > = values in this interval not displayed.     CBC  Recent Labs   Lab 12/06/20  0353 12/05/20  1637 12/05/20  0404 12/04/20  0540 12/03/20  1108   HGB 14.8 15.0 15.9 16.7 15.8   WBC 13.0*  --  18.4* 10.6 9.7   RBC 4.69  --  4.92 5.09 4.82   HCT 45.4  --  47.9 49.4 46.5   MCV 97  --  97 97 97   MCH 31.6  --  32.3 32.8 32.8   MCHC 32.6  --  33.2 33.8 34.0   RDW 15.5*  --  15.6* 15.3* 15.0   *  --  503* 323 251     INR  Recent Labs   Lab 12/06/20  0353 12/05/20  0404 12/04/20  2136 12/04/20  0540   INR 5.29* 5.76* 6.63* 5.32*     ABG  Recent Labs   Lab 12/06/20  0808 12/06/20  0346 12/05/20  1446 12/05/20  1327 12/05/20  0749 12/05/20  0419   PH  --  7.44 7.38  --  7.38 7.38   PCO2  --  36 39  --  39 36   PO2  --  80 101  --  100 109*   HCO3  --  24 23  --  23 21   O2PER 60.0 60.0 70 70 70.0 80      URINE STUDIES  Recent Labs   Lab Test 12/05/20  1327 12/04/20  2256 03/04/20  1400 04/26/19  1437   COLOR Yellow Yellow Yellow Yellow   APPEARANCE Slightly Cloudy Slightly Cloudy Clear Clear   URINEGLC Negative Negative Negative Negative   URINEBILI Negative Negative Negative Negative   URINEKETONE Negative Negative Negative Negative   SG 1.016 1.015 1.013 1.013   UBLD Moderate* Moderate* Negative Negative   URINEPH 5.0 5.5 5.0 5.0   PROTEIN 30* 30* Negative Negative   NITRITE Negative Negative Negative Negative   LEUKEST Negative Negative Negative Negative   RBCU 103* 9* 2 2   WBCU 9* 0 1 1     No lab results found.  PTH  No lab results found.  IRON  STUDIES  Recent Labs   Lab Test 10/24/18  0913 10/24/18  0634 03/30/17  0724   IRON 75 Canceled, Test credited 56   * Canceled, Test credited 411   IRONSAT 17 Not Calculated 14*   CHERELLE  --  67 290       IMAGING:  All imaging studies reviewed by me.     Clark Hess MD      I was present with the fellow during the history and exam.  I discussed the case with the fellow and agree with the findings as documented in the assessment and plan.  Justa Sue

## 2020-12-06 NOTE — PROVIDER NOTIFICATION
2000 After turning patient, this RN noted rhythm conversion from SVT to sinus rhythm in 60s.  MICU 1 notified, hemodynamics and vitals reviewed.  Stat EKG ordered and obtained.    0030  This RN notified MICU 1 of heart rate alarms of 50.  Blood pressure reviewed with team.  Awaiting orders.      0115  This RN again contacted MICU 1 regarding bradycardia, stating that it was more pronounced and into the 40s.  MICU 1 stated that he hadn't looked up the patient yet but that he would go speak with the cards fellow at this time.  This RN then called pharmacy and inquired about bradycardia and remdesivir .  Patient history, labs, medications, and vitals reviewed with pharmacist.  Per pharmacist, she did not think this downward developing of bradycardia was due to remdesivir and that they would potentially recommend holding morning amio dose.  This RN later received a call from MICU 1 at 0145 regarding patient concerns.  MICU 1 stated that if heart rate was persistently under 30 that they would switch norepinepherine to epinepherine.

## 2020-12-06 NOTE — PROGRESS NOTES
Major Shift Events: Propofol switched to dex, opening eyes to voice but not following commands. Developed fever this afternoon, Ice packs applied and tylenol given. Pan cultured. Remains in ST 140s. Pressor requirements decreasing through shift. LVAD PI continues to be low, team aware. Given 250ml bolus for CVP<10, goal 10-12.     Plan: Wean vent and pressors as tolerated.      For vital signs and complete assessments, please see documentation flowsheets.

## 2020-12-06 NOTE — PLAN OF CARE
Major Shift Events:  Does not follow commands, agitated and trying to self extubate when awake.  Weak and palpable pulses, LVAD numbers stable.  Rhythm converting from tachycardia to sinus and bradycardia with frequent pvcs.  Weaning norepi.  Lungs coarse, stable on ordered vent settings.  Voiding in good amounts.  Turned q 2 for skin integrity.    Plan:  Continue to monitor rhythm and labs, monitor vad numbers.  For vital signs and complete assessments, please see documentation flowsheets.

## 2020-12-07 NOTE — CONSULTS
GENERAL ID SERVICE PROGRESS NOTE     Patient:  Danielito Chu   Date of birth 1955, Medical record number [id number]  Date of Visit:  12/07/2020  Date of Admission: 12/3/2020  Consult Requester:Windy Kauffman MD          Assessment and Recommendations:   ASSESSMENT: Jeff is a 64 y/o male with history of HFrEF s/p ICD 2017 and LVAD 2018 admitted 12/3 from [place] ED for Acute hypoxic respiratory failure secondary to COVID PNA. 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     Problems  1. Possible LVAD drive line drainage (acute on chronic) and Hyperattenuation of soft tissue adjacent to drive line without fluid collection on CT. There is very low suspicion for drive line infection given site appears clean and dry and without erythema, purulent discharge or swelling.   2. 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 Acute hypoxic respiratory failure 2/2 COVID PNA  4. Nonocclusive thrombus of proximal celiac artery     RECOMMENDATION:  1. Agree with discontinuing Vancomycin   2. Continue Doxycyline for now possible drive line infection.   3. Continue Zosyn for now given septic shock   2. Follow aerobic and anaerobic culture from drive line exit site. No growth to date.   4. Agree with holding remdemsivir in the setting of transaminatis    Thank you for this consult. ID will continue to follow. Thank you for allowing us to participate in his care.     Patient was discussed with Dr. Bernstein.     Oly Price MD  Internal Medicine and Pediatrics, PGY1       Interval History   Jeff decompensated on 1/4 requiring intubation and transfer to the ICU. Given concern for septic shock and requiring pressors, ceftriaxone was discontinued and vanc/zosyn were started.     12/7: Patient unable to be interviewed today due to intubation.          History of Present Illness:   Jeff is a 64 y/o male with history of HFrEF s/p ICD 2017 and LVAD 2018 admitted 12/3 from  [place] ED for Acute hypoxic respiratory failure secondary to COVID PNA.     Jeff was feeling well until about 2 weeks ago when he started feeling short of breath. He was dyspneic on exertion and mildly at rest. He was also coughing at that time, productive of green sputum. He did not check his temperature. He progressively worsened and on the evening of 11/29 he had an episode of dizziness but that later resolved. On the morning of 12/1 he was not longer able to go about his normal daily activities which progressively worsened throughout the day and he presented to the ED.          Review of Systems:   Unable to perform due to mechanical ventilation.          Past Medical History:     Past Medical History:   Diagnosis Date     Acute systolic congestive heart failure (H) 4/5/2017     Diabetes (H)      HTN (hypertension)      Hyperlipidemia      Liver disease      LVAD (left ventricular assist device) present (H)      3 12/18     Marijuana abuse      Mitral regurgitation      Nocturnal oxygen desaturation 3/29/2018     Nonischemic cardiomyopathy (H) 4/5/2017     SHIRLEY (obstructive sleep apnea)      Pacemaker 04/07/2017    ICD 4/7/17     Situational mixed anxiety and depressive disorder             Past Surgical History:     Past Surgical History:   Procedure Laterality Date     CV RIGHT HEART CATH N/A 5/9/2019    Procedure: CV RIGHT HEART CATH;  Surgeon: Jules Allan MD;  Location:  HEART CARDIAC CATH LAB     CV RIGHT HEART CATH N/A 7/2/2020    Procedure: CV RIGHT HEART CATH;  Surgeon: Jeremy Mckeon MD;  Location: [facility] CARDIAC CATH LAB     ESOPHAGOSCOPY, GASTROSCOPY, DUODENOSCOPY (EGD), COMBINED N/A 11/21/2018    Procedure: COMBINED ESOPHAGOSCOPY, GASTROSCOPY, DUODENOSCOPY (EGD);  Surgeon: Candido Mendez MD;  Location:  GI     IMPLANT IMPLANTABLE CARDIOVERTER DEFIBRILLATOR       INSERT VENTRICULAR ASSIST DEVICE LEFT (HEARTMATE II/III) MINIMALLY INVASIVE N/A 12/5/2018    Procedure:  Partial Median Sternotomy, Left Thoracotomy, Minimally Invasive Left Ventricular Assist Device Placement (Heartmate III), On Cardiopulmonary Bypass;  Surgeon: Andrei Silva MD;  Location:  OR            Family History:   Reviewed and non-contributory.   Family History   Problem Relation Age of Onset     Heart Failure Father          at age 86     Heart Failure Paternal Uncle          at 66      Myocardial Infarction Paternal Uncle          at age 62     Coronary Artery Disease Mother          during CABG at age 41            Social History:     Social History     Tobacco Use     Smoking status: Former Smoker     Packs/day: 0.30     Years: 20.00     Pack years: 6.00     Types: Cigarettes     Smokeless tobacco: Never Used     Tobacco comment: quit 3/2017   Substance Use Topics     Alcohol use: No     Frequency: Never     Comment: rare     History   Sexual Activity     Sexual activity: Not on file            Current Medications:       albumin human  25 g Intravenous Once     albumin human         allopurinol  200 mg Oral Daily     aspirin  81 mg Oral Daily     dexamethasone  6 mg Oral Q24H     doxycycline hyclate  100 mg Oral Q12H VITALY     insulin aspart  1-10 Units Subcutaneous Q4H     lidocaine  1 patch Transdermal Q24h    And     lidocaine   Transdermal Q8H     [Held by provider] losartan  25 mg Oral Daily     [Held by provider] metoprolol succinate ER  37.5 mg Oral Daily     oxyCODONE  10 mg Oral Q4H     pantoprazole  40 mg Oral QAM AC     piperacillin-tazobactam  4.5 g Intravenous Q6H     polyethylene glycol  17 g Oral Daily     protein modular  1 packet Per Feeding Tube BID     senna-docusate  1 tablet Oral At Bedtime     sodium phosphate  15 mmol Intravenous Once     [Held by provider] spironolactone  37.5 mg Oral Daily     thiamine  100 mg Oral Daily            Allergies:   No Known Allergies         Physical Exam:   Vitals were reviewed  Patient Vitals for the past 24 hrs:   Temp  Temp src Pulse Resp SpO2 Weight   12/07/20 1315 -- -- 52 -- 92 % --   12/07/20 1300 -- -- (!) 48 -- 92 % --   12/07/20 1245 -- -- 52 -- 93 % --   12/07/20 1230 -- -- 50 -- 94 % --   12/07/20 1215 -- -- 51 -- 95 % --   12/07/20 1200 98.9  F (37.2  C) Axillary 50 20 94 % --   12/07/20 1130 -- -- 51 -- 94 % --   12/07/20 1115 -- -- 50 -- 96 % --   12/07/20 1100 -- -- 51 -- 95 % --   12/07/20 1045 -- -- 50 -- 95 % --   12/07/20 1030 -- -- 51 -- 94 % --   12/07/20 1015 -- -- (!) 49 -- 95 % --   12/07/20 1000 -- -- 52 -- 95 % --   12/07/20 0945 -- -- 50 -- 95 % --   12/07/20 0930 -- -- (!) 49 -- 94 % --   12/07/20 0915 -- -- (!) 49 -- 94 % --   12/07/20 0900 -- -- (!) 48 -- 94 % --   12/07/20 0845 -- -- 51 -- 94 % --   12/07/20 0830 -- -- 50 -- 93 % --   12/07/20 0815 -- -- (!) 49 -- 93 % --   12/07/20 0800 99.3  F (37.4  C) Axillary 52 20 92 % --   12/07/20 0745 -- -- 75 -- 94 % --   12/07/20 0730 -- -- 52 -- 95 % --   12/07/20 0715 -- -- 52 -- 93 % --   12/07/20 0700 -- -- 53 20 94 % --   12/07/20 0645 -- -- 56 -- 92 % --   12/07/20 0630 -- -- 60 -- 93 % --   12/07/20 0615 -- -- 53 -- 94 % --   12/07/20 0600 -- -- 50 20 93 % --   12/07/20 0545 -- -- (!) 48 -- 94 % --   12/07/20 0530 -- -- 52 -- 94 % --   12/07/20 0515 -- -- 52 -- 94 % --   12/07/20 0500 -- -- 52 20 94 % --   12/07/20 0445 -- -- 53 -- 94 % --   12/07/20 0430 -- -- 50 -- 92 % --   12/07/20 0415 -- -- 53 -- 95 % --   12/07/20 0400 98.1  F (36.7  C) Axillary (!) 49 20 95 % --   12/07/20 0345 -- -- 52 -- 95 % --   12/07/20 0330 -- -- 52 -- 95 % --   12/07/20 0315 -- -- 51 -- 95 % --   12/07/20 0300 -- -- 51 20 95 % --   12/07/20 0245 -- -- 51 -- 95 % --   12/07/20 0230 -- -- 53 -- 94 % --   12/07/20 0215 -- -- 52 -- 93 % --   12/07/20 0200 -- -- 50 20 95 % --   12/07/20 0145 -- -- (!) 48 -- 95 % --   12/07/20 0130 -- -- (!) 47 -- 95 % --   12/07/20 0115 -- -- (!) 49 -- 96 % --   12/07/20 0100 -- -- (!) 48 20 95 % --   12/07/20 0045 -- -- (!) 49 -- 95 % --    12/07/20 0030 -- -- (!) 49 -- 95 % --   12/07/20 0015 -- -- (!) 49 -- 96 % --   12/07/20 0000 97.9  F (36.6  C) Axillary (!) 48 20 96 % 89.4 kg (197 lb 1.5 oz)   12/06/20 2345 -- -- 50 -- 95 % --   12/06/20 2330 -- -- 53 -- 95 % --   12/06/20 2315 -- -- 53 -- 95 % --   12/06/20 2300 -- -- 52 20 95 % --   12/06/20 2245 -- -- 52 -- 95 % --   12/06/20 2230 -- -- 53 -- 95 % --   12/06/20 2215 -- -- (!) 48 -- 96 % --   12/06/20 2200 -- -- 53 20 97 % --   12/06/20 2145 -- -- (!) 49 -- 96 % --   12/06/20 2130 -- -- (!) 48 -- 96 % --   12/06/20 2115 -- -- (!) 49 -- 96 % --   12/06/20 2100 -- -- (!) 49 20 96 % --   12/06/20 2045 -- -- (!) 49 -- 95 % --   12/06/20 2030 -- -- (!) 49 -- 95 % --   12/06/20 2015 -- -- (!) 49 -- 94 % --   12/06/20 2000 97.5  F (36.4  C) Axillary 51 20 94 % --   12/06/20 1900 -- -- (!) 48 -- 96 % --   12/06/20 1845 -- -- (!) 48 -- 96 % --   12/06/20 1830 -- -- (!) 49 -- 96 % --   12/06/20 1815 -- -- (!) 49 -- 96 % --   12/06/20 1800 -- -- -- -- 96 % --   12/06/20 1745 -- -- (!) 49 -- 96 % --   12/06/20 1715 -- -- (!) 48 -- 96 % --   12/06/20 1700 -- -- 50 -- 95 % --   12/06/20 1645 -- -- (!) 47 -- 96 % --   12/06/20 1630 -- -- (!) 48 -- 94 % --   12/06/20 1600 98.2  F (36.8  C) Oral (!) 47 20 95 % --   12/06/20 1545 -- -- (!) 46 -- 95 % --   12/06/20 1530 -- -- (!) 47 -- 94 % --   12/06/20 1515 -- -- (!) 49 -- 97 % --   12/06/20 1500 -- -- (!) 48 -- 97 % --   12/06/20 1445 -- -- (!) 46 -- 96 % --   12/06/20 1430 -- -- (!) 46 -- 96 % --   12/06/20 1415 -- -- (!) 47 -- 95 % --   12/06/20 1400 -- -- (!) 49 -- 96 % --       Physical Examination:  GENERAL: In bed, intubated    HEENT:  Head is normocephalic, atraumatic   EYES:  Eyes have anicteric sclerae without conjunctival injection or stigmata of endocarditis.    NECK:  Supple. No cervical lymphadenopathy  LUNGS: on mechanical ventilation. Coarse lung sounds throughout   CARDIOVASCULAR:  LVAD hum  ABDOMEN:  Normal bowel sounds, soft, nontender.    SKIN:  Skin near LVAD drive line has brownish pigmentation however there are no signs of erythema, swelling or drainage.   NEUROLOGIC: Sedated          Laboratory Data:     Inflammatory Markers    Recent Labs   Lab Test 12/07/20  0348 12/06/20  0353 12/05/20  0404 12/04/20  0540 12/03/20  1108 10/27/18  0605 10/24/18  0634   CRP 82.0* 150.0* 160.0* 190.0* 130.0* 3.1 5.0  4.4       Hematology Studies    Recent Labs   Lab Test 12/07/20  1216 12/07/20  1023 12/07/20  0348 12/06/20  1642 12/06/20  0353 12/05/20  1637 12/05/20  0404 12/04/20  0540 12/03/20  1108 12/21/18  0906 12/21/18  0906 11/13/18  2119 11/13/18  2119 10/30/18  0514   WBC 13.7* 14.8* 11.9* 13.2* 13.0*  --  18.4* 10.6 9.7   < > 13.4*   < > 6.4 7.9   ANEU  --   --   --   --   --   --  16.1* 8.8* 7.2  --  10.7*  --  4.2 4.4   AEOS  --   --   --   --   --   --  0.0 0.0 0.0  --  0.0  --  0.0 0.2   HGB 13.4 14.1 14.3 13.9 14.8 15.0 15.9 16.7 15.8   < > 10.1*   < > 17.1 17.9*   MCV 98 98 98 97 97  --  97 97 97   < > 96   < > 97 97   * 507* 474* 461* 470*  --  503* 323 251   < > 601*   < > 165 210    < > = values in this interval not displayed.       Metabolic Studies     Recent Labs   Lab Test 12/07/20  1216 12/07/20  0348 12/06/20  0353 12/05/20  1637 12/05/20  0404   * 145* 143 143 139   POTASSIUM 3.9 3.9 3.9 4.1 4.4   CHLORIDE 117* 114* 110* 113* 109   CO2 24 23 23 21 21   BUN 30 30 29 25 22   CR 1.01 1.00 1.28* 1.37* 1.45*   GFRESTIMATED 78 79 59* 54* 50*       Hepatic Studies    Recent Labs   Lab Test 12/07/20  1216 12/07/20  0348 12/06/20  0353 12/05/20  0404 12/04/20  2103 12/03/20  1108   BILITOTAL 1.4* 1.0 1.8* 0.9 1.1 1.2   ALKPHOS 108 109 112 131 130 124   ALBUMIN 2.0* 2.2* 2.3* 2.6* 2.4* 2.6*   * 201* 569* 367* 163* 39   * 307* 394* 148* 47 27       Microbiology:  Culture Micro   Date Value Ref Range Status   12/05/2020 No growth after 2 days  Preliminary   12/05/2020 No growth after 2 days  Preliminary   12/05/2020  No growth  Final   12/04/2020 Culture negative monitoring continues  Preliminary   12/03/2020 No growth after 4 days  Preliminary   12/03/2020 No growth after 4 days  Preliminary   12/03/2020 Light growth  Normal skin hailey    Final   12/13/2018 No growth  Final   12/09/2018 No growth  Final   12/07/2018 (A)  Final    >10 Squamous epithelial cells/low power field indicates oral contamination. Please   recollect.     12/07/2018 Canceled, Test credited  Final   12/07/2018   Final    Notification of test cancellation was given to  Sigifredo Peck RN, @2233 12/07/18..     12/07/2018 No growth  Final   12/07/2018 No growth  Final   10/23/2018 No growth  Final   04/07/2017 No growth  Final       Urine Studies    Recent Labs   Lab Test 12/05/20  1327 12/04/20  2256 03/04/20  1400 04/26/19  1437 12/07/18  0801   LEUKEST Negative Negative Negative Negative Trace*   WBCU 9* 0 1 1 2       Vancomycin Levels  No lab results found.    Invalid input(s): VANCO    Hepatitis B Testing   Recent Labs   Lab Test 10/24/18  0634   HBCAB Nonreactive   HEPBANG Nonreactive     Hepatitis C Testing     Hepatitis C Antibody   Date Value Ref Range Status   07/24/2020 Nonreactive NR^Nonreactive Final     Comment:     Assay performance characteristics have not been established for newborns,   infants, and children     12/06/2019 Nonreactive NR^Nonreactive Final     Comment:     Assay performance characteristics have not been established for newborns,   infants, and children       Respiratory Virus Testing    No results found for: RS, FLUAG

## 2020-12-07 NOTE — CONSULTS
[facility]     Cardiology Progress Note- Cards 2        Date of Admission:  12/3/2020     Assessment & Plan: HVSL   Danielito Chu is a 65 year old male admitted on 12/3/2020 with COVID pneumonia. He has a history of NICM (LVEF at dong of 15%) s/p ICD (4/2017), who underwent HM3 LVAD implant on 12/5/18 as DT, now BTT. He has a history of atrial arrythmia, s/p ICD, on amiodarone and metoprolol. He was admitted as transfer to MICU from [place] ED for ARHF 2/2 COVID pneumonia. 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 Over the course of hospitalization, his respiratory status has progressively deteriorated and he was ultimately transferred back to the MICU and emergently intubated. He then developed an elevated troponin (peak 24.782) without ischemic EKG changes, concerning for myocarditis>ACS/coronary embolism. Over the past several hours he has had progressive decline of LVAD flow, down to 2.4lpm. He was weaned from norepinephrine and MAPs are stable. There is no sign of bleeding. He was given several boluses of IVF without improvement, and CVP is in the low teens indicating adequate RV preload. There are no signs of acute worsening of the RV (LFTs improving, CVP stable). We are concerned for outflow thrombus causing reduced flows. Otherwise his hemodynamics have been stable and there are no other signs of worsening end-organ function.      Today's Recommendations:  - CTA to reevaluate outflow thrombus  - continue high-intensity heparin protocol  - wean from norepinephrine  - LVAD interrogation by LVAD coordinator to ensure device integrity    WE HAVE SILENCED ALL LVAD ALARMS FOR THE NEXT FOUR HOURS. PATIENT WILL NEED 1:1 MONITORING DI+URING THIS PERIOD.     #Covid Pneumonia  #Acute hypoxic respiratory failure  - appreciate cares per primary medical team  - on dexamethasone  - remdesivir held 2/2  transaminitis     #COVID coagulopathy  #Supratherapuetic INR, reversed  - Continue high-intensity heparin  - trend LDH  - pending LDH isoenzymes to differentiate cardiac contribution     #Persistent atrial tachycardia  #Sinus Bradycardia  - continue to hold amiodarone  - continue to hold metoprolol     #NICM s/p LVAD 12/5/18  #Myocarditis  #RV Dysfunction  - ok to hold PTA spironolactone and losartan given tenuous hemodynamics  - telemetry monitoring for ventricular dysrrythmias  - check LDH isoenzymes  - trend LFTs  - trend SvO2 at least daily  - continue to trend CVP     #LVAD outflow thrombus  #LVAD driveline infection  - repeat CTA today  - continue high-intensity heparin gtt  - cultures NGTD  - appreciate ID recommendations  - continue antibiotics    Entered: Chico Ventura MD 12/07/2020, 8:36 AM       The patient's care was discussed with the Attending Physician, Dr. Weems, Bedside Nurse and Primary team.    Chico Ventura MD   Cardiology Fellow  Pager: [phone]  Amcom: [phone]  [facility]   Please see sign in/sign out for up to date coverage information  ______________________________________________________________________    Interval History   No significant events overnight. Troponin has peaked. This morning started having LVAD low flow alarms for flows down to 2.4lpm. Alarms were persistent. Hemodynamics otherwise stable. Labs improving.     Data reviewed today: I reviewed all medications, new labs and imaging results over the last 24 hours.    Physical Exam   Vital Signs: Temp: 99.3  F (37.4  C) Temp src: Axillary  Pulse: 52   Resp: 20 SpO2: 92 % O2 Device: Mechanical Ventilator    Weight: 197 lbs 1.46 oz  General Appearance:  Sedated and intubated  Respiratory: intubated and mechanically ventilated, poor air movement over the bases, no crackles  Cardiovascular: LVAD hum  GI: soft, ND, hypoactive BS  Skin: warm, diaphoretic, no rash  Ext: WWP, trace edema  Neuro:  sedated, no spontaneous movements      Data   Recent Labs   Lab 12/07/20  1216 12/07/20  1023 12/07/20  0348 12/06/20  1412 12/06/20  1412 12/06/20  0808 12/06/20  0353 12/05/20  0404 12/05/20  0404   WBC 13.7* 14.8* 11.9*   < >  --   --  13.0*  --  18.4*   HGB 13.4 14.1 14.3   < >  --   --  14.8   < > 15.9   MCV 98 98 98   < >  --   --  97  --  97   * 507* 474*   < >  --   --  470*  --  503*   INR  --   --  2.15*  --   --   --  5.29*  --  5.76*   *  --  145*  --   --   --  143   < > 139   POTASSIUM 3.9  --  3.9  --   --   --  3.9   < > 4.4   CHLORIDE 117*  --  114*  --   --   --  110*   < > 109   CO2 24  --  23  --   --   --  23   < > 21   BUN 30  --  30  --   --   --  29   < > 22   CR 1.01  --  1.00  --   --   --  1.28*   < > 1.45*   ANIONGAP 7  --  7  --   --   --  10   < > 9   ASHLEE 8.4*  --  8.3*  --   --   --  8.6   < > 8.7   *  --  189*  --   --   --  162*   < > 169*   ALBUMIN 2.0*  --  2.2*  --   --   --  2.3*  --  2.6*   PROTTOTAL 5.7*  --  6.0*  --   --   --  6.2*  --  6.8   BILITOTAL 1.4*  --  1.0  --   --   --  1.8*  --  0.9   ALKPHOS 108  --  109  --   --   --  112  --  131   *  --  307*  --   --   --  394*  --  148*   *  --  201*  --   --   --  569*  --  367*   LIPASE  --   --  133  --   --  116  --   --  1,262*   TROPI  --   --   --   --  22.609* 24.782* 21.830*  --   --     < > = values in this interval not displayed.     Recent Results (from the past 24 hour(s))   XR Chest Port 1 View    Narrative    EXAM: XR CHEST PORT 1  12/7/2020 11:52 AM      HISTORY: Oxygenation.    COMPARISON: Previous day.     TECHNIQUE: Frontal view of the chest.    FINDINGS: Postsurgical changes of LVAD placement with median  sternotomy wires intact. Trachea is midline. ET tube is 3.7 cm from  the salomón. Enteric tube is subdiaphragmatic with tip collimated from  this exam. ICD device overlying the left chest. Left IJ CVC with tip  terminating at the brachiocephalic confluence. Stable  cardiomegaly.  Stable elevated right hemidiaphragm. Stable bilateral diffuse  interstitial and airspace opacities. Left retrocardiac opacity.  Haziness overlying the bilateral costophrenic angles. No acute osseous  abnormality.      Impression    IMPRESSION:   1. Postsurgical changes of LVAD placement.  2. Stable bilateral diffuse interstitial and airspace opacities.  3. Stable support devices.    I have personally reviewed the examination and initial interpretation  and I agree with the findings.    ADAMS WILSON, DO     Medications     dexmedetomidine 0.5 mcg/kg/hr (12/07/20 1400)     dextrose       epoprostenol (VELETRI) 20 mcg/mL in sterile water inhalation solution 20 ng/kg/min (12/07/20 1214)     fentaNYL 100 mcg/hr (12/07/20 1400)     heparin 1,600 Units/hr (12/07/20 1400)     lactated ringers Stopped (12/07/20 1100)     midazolam Stopped (12/05/20 0123)     norepinephrine Stopped (12/07/20 1300)     - MEDICATION INSTRUCTIONS -         allopurinol  200 mg Oral Daily     aspirin  81 mg Oral Daily     dexamethasone  6 mg Oral Q24H     doxycycline hyclate  100 mg Oral Q12H VITALY     insulin aspart  1-10 Units Subcutaneous Q4H     iopamidol  100 mL Intravenous Once     lidocaine  1 patch Transdermal Q24h    And     lidocaine   Transdermal Q8H     [Held by provider] losartan  25 mg Oral Daily     [Held by provider] metoprolol succinate ER  37.5 mg Oral Daily     oxyCODONE  10 mg Oral Q4H     pantoprazole  40 mg Oral QAM AC     piperacillin-tazobactam  4.5 g Intravenous Q6H     polyethylene glycol  17 g Oral Daily     protein modular  1 packet Per Feeding Tube BID     senna-docusate  1 tablet Oral At Bedtime     sodium chloride (PF)  90 mL Intravenous Once     sodium phosphate  15 mmol Intravenous Once     [Held by provider] spironolactone  37.5 mg Oral Daily     thiamine  100 mg Oral Daily

## 2020-12-07 NOTE — PROGRESS NOTES
Nephrology Progress Note  12/07/2020         Assessment & Recommendations:   Danielito Chu is a 65 year old male with HM3 LVAD (12/2018) bridge to heart transplant, NICM, (LVEF at dong of 15%) s/p ICD (4/2017), DM2, CKD II, gout, GERD who was initially admitted to the ICU at [facility] on 12/3/2020 for evaluation of  fever, dyspnea and weakness and loss of appetite and transfer from [place] ED for ARHF 2/2 COVID pneumonia and possible LVAD drive line infection. Now with acute acute hypoxemic respiratory failure requiring mechanical ventilation and readmission to the ICU. Nephrology consulted for CRUZ         CRUZ secondary to sepsis related hypoperfusion resulting in ischemia .  Contrast associated nephropathy could be contributory to (contrast study on 12/3/2020)  Non Oliguric . UOP 2.5 Litre since MN   Cr improving  1.07 --> 1.59 ( 12/5) --> 1.28 ( 12/6)   FENA 0.2 -indicating towards prerenal mechanism  UA : Specific gravity 1.016, protein 30, blood: Moderate, , WBC 9.  Increased hyaline casts.  Nitrite negative, leukocyte Esterase negative.  With normal hemoglobin, increased platelets, TMA unlikely  AIN unlikely due to patient responding to resuscitative measures  CT chest abdomen pelvis: No hydronephrosis/mass  Antimicrobials : Doxy, Zosyn,Remdesivir,Vancomycin     Elevated troponin - being evaluated by primary team     Hypernatremia : sodium elevated to 145-->148. Replace with free water. Free water deficit is about 2.7L + insensible losses.     Recommendations   - pt with improved creatinine with fluid resuscitation, which supports diagnosis of prerenal etiology.  - Fluid management per primary team     Nephrology will sign off at this point with improved creatinine, please call with any questions or concerns,    Recommendations were communicated to primary team via this note    Seen and discussed with Dr. Sari Morejon MD   [phone]    Interval History :   Nursing and provider notes  "from last 24 hours reviewed.  Pt was intubated and sedated    Physical Exam:   I/O last 3 completed shifts:  In: 3085.11 [I.V.:1865.11; NG/GT:380]  Out: 1215 [Urine:1215]   BP (!) 84/67   Pulse 52   Temp 99.3  F (37.4  C) (Axillary)   Resp 20   Ht 1.778 m (5' 10\")   Wt 89.4 kg (197 lb 1.5 oz)   SpO2 92%   BMI 28.28 kg/m       General : Pt awake, not in acute distress   Dialysis Access : N/A    Further exam is limited in setting of COVID infection.    Labs:   All labs reviewed by me  Electrolytes/Renal -   Recent Labs   Lab Test 12/07/20  0348 12/06/20  0353 12/05/20  1637 12/04/20  0540 12/04/20  0540   * 143 143   < > 138   POTASSIUM 3.9 3.9 4.1   < > 4.1   CHLORIDE 114* 110* 113*   < > 110*   CO2 23 23 21   < > 18*   BUN 30 29 25   < > 11   CR 1.00 1.28* 1.37*   < > 0.97   * 162* 141*   < > 126*   ASHLEE 8.3* 8.6 8.3*   < > 9.0   MAG 2.2 2.5* 1.7  --  2.2   PHOS 2.2* 2.8  --   --  2.8    < > = values in this interval not displayed.       CBC -   Recent Labs   Lab Test 12/07/20  0348 12/06/20  1642 12/06/20  0353   WBC 11.9* 13.2* 13.0*   HGB 14.3 13.9 14.8   * 461* 470*       LFTs -   Recent Labs   Lab Test 12/07/20  0348 12/06/20  0353 12/05/20  0404   ALKPHOS 109 112 131   BILITOTAL 1.0 1.8* 0.9   * 394* 148*   * 569* 367*   PROTTOTAL 6.0* 6.2* 6.8   ALBUMIN 2.2* 2.3* 2.6*       Iron Panel -   Recent Labs   Lab Test 10/24/18  0913 10/24/18  0634 03/30/17  0724   IRON 75 Canceled, Test credited 56   IRONSAT 17 Not Calculated 14*   CHERELLE  --  67 290     Current Medications:    allopurinol  200 mg Oral Daily     aspirin  81 mg Oral Daily     dexamethasone  6 mg Oral Q24H     doxycycline hyclate  100 mg Oral Q12H VITALY     insulin aspart  1-6 Units Subcutaneous Q4H     lidocaine  1 patch Transdermal Q24h    And     lidocaine   Transdermal Q8H     [Held by provider] losartan  25 mg Oral Daily     [Held by provider] metoprolol succinate ER  37.5 mg Oral Daily     pantoprazole  40 mg " Oral QAM AC     piperacillin-tazobactam  4.5 g Intravenous Q6H     polyethylene glycol  17 g Oral Daily     protein modular  1 packet Per Feeding Tube BID     senna-docusate  1 tablet Oral At Bedtime     [Held by provider] spironolactone  37.5 mg Oral Daily     thiamine  100 mg Oral Daily     vancomycin (VANCOCIN) IV  1,750 mg (central catheter) Intravenous Q24H       dexmedetomidine 0.5 mcg/kg/hr (12/07/20 0800)     dextrose       epoprostenol (VELETRI) 20 mcg/mL in sterile water inhalation solution 20 ng/kg/min (12/07/20 0700)     fentaNYL 100 mcg/hr (12/07/20 0800)     HEParin 900 Units/hr (12/07/20 0800)     lactated ringers 75 mL/hr at 12/07/20 0800     midazolam Stopped (12/05/20 0123)     norepinephrine 0.02 mcg/kg/min (12/07/20 0800)     - MEDICATION INSTRUCTIONS -       Florecita Morejon MD

## 2020-12-07 NOTE — PLAN OF CARE
Major Shift Events:  Inconsistently follows commands, weaning norepi as able.  LVAD flow 3s-4s with no alarms.  Stable on ordered vent settings.    Plan: monitor lvad numbers, maintain hemodynamics  For vital signs and complete assessments, please see documentation flowsheets.

## 2020-12-07 NOTE — PROGRESS NOTES
Indication of Interrogation:  VAD alarms    Type of VAD:  Heartmate 3    Current Parameters:  Flow= 2.7 lpm, Speed= 5500 rpm, Power= 4.0 ureña, PI = 3.4    Abnormal Alarm on History:  Yes, explain: the history had events only from today. Low flow with flows in the mid 2s.    Abnormal Events/Parameters Notes:  No    Changes Made during Interrogation:  Yes, explain: The hematocrit was turned all the way down to 20 per Dr. Denia Fowler.     Waveforms downloaded and sent to engineers at [employer] for anlysis

## 2020-12-07 NOTE — PROGRESS NOTES
"Yana the RN called stating VAD is low flow alarming almost continuously at 2.4-2.6 LPM. PI is in the 3s. Map 75 off of pressors. Albumin and LR boluses have been given.  Cards 2 notified by Yana the RN. Chest CTA ordered. Dr. Weems requested we Extend Silence the alarms now.  Extend silence activated at 345pm. See below for how to reactivate the extend silence. 1:1 RN care while extend silence alarm activated.    To utilize VAD Extended Alarm Silence:      *This will silence ALL alarms for 4 hours and requires an order from the MD and discussion with leadership.  *Please continuously monitor the VAD screen and equipment to assess for new alarms     Steps:  To use the extended alarm silence, the speed must be <4000 rpm for Heartmate 3 (or < 8000 rpm for Heartmate II) AND the VAD must be actively alarming      1. Have patient lying down and connected to wall power  2. Drop speed (temporarily only) to 3900rpm   A. Hit the \"Settings\" tab    B. Chose \"Fixed Speed\"    C. Adjust speed down using arrow to 3900rpm   D. Accept speed change  3. Disconnect BLACK  power cable (temporarily only to cause an alarm)  4. Go to the \"Alarms\" tab  5. Now the \"Extended Alarm Silence\" button should be visible in the lower right corner of the screen. Push it.   6. Increase the patient's speed back to ordered rate.    A. Hit the \"Settings\" tab    B. Chose \"Fixed speed\"    C. Adjust speed up using arrow to ordered rate.   D. Accept speed change  7. Reconnect patient's power cable  8. If medical team, patient or family member would like alarms turned back on, page on call VAD Coordinator for instructions.      Please page the on call VAD Coordinator (job code [phone]) at any time with questions about this process.   "

## 2020-12-07 NOTE — PROGRESS NOTES
MEDICAL ICU PROGRESS NOTE  12/07/2020      Date of Service (when I saw the patient): 12/07/2020    ASSESSMENT: 66yo M with HM3 LVAD (12/2018) bridge to heart transplant, NICM, (LVEF at dong of 15%) s/p ICD (4/2017), DM2, CKD II, gout, GERD who was initially admitted to the ICU at [facility] on 12/3/2020 for evaluation of  fever, dyspnea and weakness and loss of appetite and transfer from [place] ED for ARHF 2/2 COVID pneumonia 12/2, transferred to floor 12/3, who acutely decompensated with hypoxic respiratory failure requiring intubation 12/4, acute on chronic heart failure on LVAD, new oliguria, septic shock and LVAD thrombus, now with low flow LVAD alarms.     CHANGES and MAJOR THINGS TODAY:   - Start oxycodone 10mg q4h, try to wean fentanyl  - CXR today to evaluate lung fields; vent changes pending CXR results  - High intensity heparin gtt  - Continue scheduled Miralax; start scheduled Senna, add PRN suppository  - Increased free water flushes to 60mL  - Increased medium sliding scale insulin to high dose  - Stop vancomycin today  - Stop maintenance IV fluids; fluid goal net even  - Low flow LVAD alarms> 250mL LR + 500mL 5% albumin   - LVAD coordinator to assess    PLAN:     Neuro:  # Pain and sedation  Analgesia: Fentanyl gtt + PRN pushes; try to wean  Sedation: Precedex gtt  - Oxycodone 10mg q4h  - RASS goal -1/-2     # Adjustment disorder  Evaluated by psychiatry on 12/4, patient expressed anxiety about upcoming procedures. Declined acute treatment.  - Pending health psychology consult, deferred until extubated and alert.     Pulmonary:  # Acute hypoxemic respiratory failure 2/2 Severe COVID-19 infection c/b severe ARDS  Transfered to [facility] on 12/3 oxymask 6L sating 96% and was transferred to the floor. On 12/4 acutely decompensated requiring mechanical ventilation, intubated. CXR showing bilateral worsening infiltrates.   - CXR today to evaluate lung fields: stable to mildly worse bilateral infiltrates   - Goal to  decrease PEEP as able to reduce right heart strain  - Wean FiO2 to keep sats >92 and <95%  - Dexamethasone (12/3-12/12*)  - Stopped Remdesivir (12/3-12/5) given transaminitis  - Full dose flolan  - Abx as below     Ventilation Mode: CMV/AC  (Continuous Mandatory Ventilation/ Assist Control)  FiO2 (%): 50 %  Rate Set (breaths/minute): 20 breaths/min  Tidal Volume Set (mL): 440 mL  PEEP (cm H2O): 10 cmH2O  Oxygen Concentration (%): 50 %  Resp: 20    Cardiovascular:  # Septic shock  Suspect sepsis and component of hypovolemia which is further worsened by his low CI/CO - suspect hypovolemic so will volume resus to improve pre-load. Considering obstructive given tachycardia, worsening respiratory status, new R sided heart failure and evidence of hypercoagulable state, DVT negative.   - Goal CVP 10-12  - Levophed gtt to keep MAPs >65    # ?Stress cardiomyopathy  # Elevated troponin (peaked at 24 on 12/6) secondary to myocarditis  Markedly elevated troponin to >20 on 12/5. TTE with poor evaluation of R side, L side unchanged. Cardiology evaluated patient on 12/6, suspect myocarditis.  - High intensity heparin gtt      # HFrEF 2/2 NICM s/p 3 LVAD as BTT on 12/5/2018, listed as status 7  # Biventricular heart failure  # s/p single chamber ICD (4/2017)  # Low flow alarms  TTE 12/4 revealed severe TR, increased RV size and severely reduced global RV function. LVEF <20%, normal LV size. Dry wt ~ 220-224. 225lb on admission.  LVAD with low flow alarms, 2.7 Lpm, CVP still 11 but given hypotension will fluid resuscitate  - Cardiology following, greatly appreciate assistance  - LVAD coordinator to eval  - 250 mL LR + 25g albumin  - Goal to decrease PEEP to minimal amount to decrease RV stress (reduced to 10)  - Hold metoprolol given new biventricular heart failure  - Holding spironolactone, losartan, bumex given acute HD decompensation.  - Trend INR  - High intensity heparin gtt, as above     # Outflow graft obstruction  US 12/3  and CT C/A/P shows non-occlusive thrombus throughout entirety of the LVAD outflow cannula with ~50% stenosis.   - CT surgery consulted: expectant management for outflow graft obstruction, to be considered for potential transplant after COVID 19     # Persistent atrial tachycardia  # H/o NSVT  S/P IV amiodarone (stopped 12/6).  - Hold PO metoprolol, as above  - Cardiology following, appreciate aid     # Lactic acidosis, improving  Likely in the setting of hypoperfusion from hypoxemia/hypotension. 5.5>2.3.     GI/Nutrition:  # Nutrition  - Tube feeds at goal  - Nutrition consulted, appreciate recs    # Constipation  - PRN suppository  - Scheduled Miralax and Senna    # Mesenteric thrombus  CT 12/4 revealed small amount of nonocclusive thrombus within the proximal.   - High intensity heparin gtt, as above    # Elevated LFTs  # Elevated lipase  Lipase elevated to 1262 however, but then normal on following measure. Feel at least one is spurious. Will continue with early TF tomorrow. AST//148 and worsening - could be 2/2 DILI (e.g. remdesivir) v. Component of hypoperfusion.   - trend LFTs  - stopped remdesivir 12/5AM  - s/p RUQ U/S (no cholecystitis)     Renal/Fluids/Electrolytes:  # Non-oliguric CRUZ, resolved  Baseline sCr 1.1-1.2>1.59 upon admission to ICU. Renal US without evidence of hydro and normal kidneys. CVP low at 5 suggesting hypovolemia,  FENa 0.2 suggesting pre-renal, and UA with hyaline casts further pointing to hypovolemia.   -Renal consult, appreciate recs  -Goal net even to mildly positive given c/f RV failure and need for preload  -Hold diuresis today unless markedly positive  -Avoid nephrotoxins  -Strict I/Os  -Daily weight     Endocrine:  # History of DM  No meds PTA. Last A1c 5.9%.  - High dose sliding scale insulin     ID:  # Severe sepsis  # C/f LVAD driveline infection  Febrile to 38.1C this AM, leukocytosis 10>18.4 overnight, ANC 16.1, new pressor requirement. Has remained with some  evidence of sepsis (febrile, remains on levophed), but workup unrevealing thus far and leukocytosis improving.  - Stop vancomycin today, no GPCs in culture  - Continue Zosyn   - Continue doxycycline    Antimicrobials:  -Ceftriaxone (12/3-12/5)  -Doxycycline (12/3-*)  -Vancomycin (12/5-12/7)  -Zosyn (12/5-*)     Micro:  LVAD driveline exit site (12/3, 12/5): Culture in progress.  Blood cx (12/3): NGTD  Blood cx (12/5): NGTD  Sputum: unable to collect thus far     Hematology:    # Supratherapeutic anticoagulation 2/2 LVAD, resolved  # COVID ppx  Holding warfarin and heparin gtt given INR 5.8. TEG with increased clot strength.    - INR and LDH trend   - High intensity heparin gtt, as above    Musculoskeletal:  # Gout  - continue PTA allopurinol  - HOLD PTA colchicine     Skin:  # No acute concerns     General Cares/Prophylaxis:    DVT Prophylaxis: Pneumatic Compression Devices  GI Prophylaxis: PPI  Restraints: None.  Family Communication: Updated by phone.  Code Status: Full code.     Lines/tubes/drains:  - ETT, brown, NG, A line, CVC, PIV     Disposition:  Critically ill in ICU, intubated and sedated. Likely >3 days.      Patient seen and findings/plan discussed with medical ICU staff, Dr. Bridges.    Everardo Crystal MD  Internal Medicine, PGY-1  Pager: [phone] text page    ====================================  INTERVAL HISTORY:     Significant troponin elevation yesterday-- peaked at 24. No acute events over night. Afebrile >24 hours. Initially was tachycardiac with rates in 130s, then abruptly on the evening of the 5th became bradycardiac with rates in 40s-50s. CVP 11 (goal 10-12). NE at 0.02. Intermittently following commands this morning.    P/F ratio 144.    No BMs yet since starting tube feeds.    Nursing notes reviewed.    OBJECTIVE:   1. VITAL SIGNS:   Temp:  [97.5  F (36.4  C)-98.2  F (36.8  C)] 98.1  F (36.7  C)  Pulse:  [46-60] 53  Resp:  [20] 20  MAP:  [63 mmHg-97 mmHg] 76 mmHg  Arterial Line BP:  ()/(56-80) 108/62  FiO2 (%):  [50 %-60 %] 50 %  SpO2:  [92 %-97 %] 94 %  Ventilation Mode: CMV/AC  (Continuous Mandatory Ventilation/ Assist Control)  FiO2 (%): 50 %  Rate Set (breaths/minute): 20 breaths/min  Tidal Volume Set (mL): 440 mL  PEEP (cm H2O): (S) 10 cmH2O (found on 10)  Oxygen Concentration (%): 50 %  Resp: 20    2. INTAKE/ OUTPUT:   I/O last 3 completed shifts:  In: 3085.11 [I.V.:1865.11; NG/GT:380]  Out: 1215 [Urine:1215]    3. PHYSICAL EXAMINATION:  General: Sedated, male who appears stated age  HEENT: ETT and OG in place  Neuro: Sedated, opens eyes to voice, follows commands intermittently   Pulm/Resp: Clear breath sounds bilaterally without rhonchi, crackles or wheeze, breathing non-labored, synchronous to ventilator  CV: bradycardic, murmur from LVAD  Abdomen: Soft, non-distended, non-tender.  : brown catheter in place.  Ext: Cool, without significant edema  Incisions/Skin: delayed capillary refill.    4. LABS:   Arterial Blood Gases   Recent Labs   Lab 12/07/20  0348 12/06/20  1151 12/06/20  0346 12/05/20  1446   PH 7.42 7.45 7.44 7.38   PCO2 38 36 36 39   PO2 72* 67* 80 101   HCO3 25 25 24 23     Complete Blood Count   Recent Labs   Lab 12/07/20  0348 12/06/20  1642 12/06/20  0353 12/05/20  1637 12/05/20  0404   WBC 11.9* 13.2* 13.0*  --  18.4*   HGB 14.3 13.9 14.8 15.0 15.9   * 461* 470*  --  503*     Basic Metabolic Panel  Recent Labs   Lab 12/07/20  0348 12/06/20  0353 12/05/20  1637 12/05/20  0419 12/05/20  0404   * 143 143  --  139   POTASSIUM 3.9 3.9 4.1  --  4.4   CHLORIDE 114* 110* 113*  --  109   CO2 23 23 21  --  21   BUN 30 29 25  --  22   CR 1.00 1.28* 1.37*  --  1.45*   * 162* 141* 166* 169*     Liver Function Tests  Recent Labs   Lab 12/07/20  0348 12/06/20  0353 12/05/20  0404 12/04/20  2136 12/04/20  2103   * 569* 367*  --  163*   * 394* 148*  --  47   ALKPHOS 109 112 131  --  130   BILITOTAL 1.0 1.8* 0.9  --  1.1   ALBUMIN 2.2* 2.3* 2.6*   --  2.4*   INR 2.15* 5.29* 5.76* 6.63*  --      Coagulation Profile  Recent Labs   Lab 20  0348 20  1412 20  0353 20  0404 20  2136   INR 2.15*  --  5.29* 5.76* 6.63*   PTT  --  42*  --   --   --        5. RADIOLOGY:   Recent Results (from the past 24 hour(s))   XR Chest Port 1 View    Narrative    Exam: AP chest radiograph, 2020 9:36 AM    Indication: Follow-up    Comparison: 2020, 2020    Findings: AP chest radiograph. Endotracheal tube tip projects over the  midthoracic trachea. Left jugular CVC catheter tip overlying the SVC.  LVAD, single lead cardiac pacer. Low lung volumes. Bilateral mixed  interstitial and airspace opacities. No pneumothorax or pleural  effusion. No acute osseous or abdominal adenopathy.      Impression    IMPRESSION: Low lung volumes with persistent mixed interstitial  airspace opacities.    I have personally reviewed the examination and initial interpretation  and I agree with the findings.    COLLEEN SANDOVAL MD   Echocardiogram Complete    Narrative    [id number]  [id number]  [id number]  [id number]^RYANN^ROGER           [facility],[place]  Echocardiography Laboratory  [address] [place] [id number]     Name: JUAN SHEN  MRN: [id number]  : 1955  Study Date: 2020 09:44 AM  Age: 65 yrs  Gender: Male  [place] [facility] [place] For Study: Myocardial Infarction  Ordering Physician: ROGER GARZON  Performed By: Lucero Buckner RDCS     BSA: 2.0 m2  Height: 70 in  Weight: 184 lb  HR: 48  BP: 111/66 mmHg  _____________________________________________________________________________  __        Procedure  Complete Portable Echo Adult. Technically difficult study.Extremely poor  acoustic windows. The final echo results were communicated to Dr. Ventura. The  final echo results were communicated to the ordering physician by phone  .  _____________________________________________________________________________  __        Interpretation Summary  Technically difficult study.Extremely poor acoustic windows.     HeartMate 3 LVAD at 5500 RPM.  LV size is difficult to assess due to suboptimal, off-axis imaging but is  probably unchanged from 12/4. Severely reduced global LV function, LVEF<20%.  Severe diffuse hypokinesis, unchanged from prior studies.  RV size and function cannot be assessed.  The ventricular septum is midline.  The aortic valve remains closed. There is mild continuous AI.  LVAD inflow cannula is visualized in the LV apex. LVAD outflow graft is  visualized in the aorta. Normal Doppler interrogation of the LVAD inflow  cannula and outflow graft.  This study was compared with the study from 12/4/20: There has been no  signficant change. Specifically, LV size is probably unchanged and LV function  is unchanged and diffuse hypokinesis is unchanged; RV was poorly imaged on  today's study and cannot be compared.  _____________________________________________________________________________  __        Left Ventricle  LV size is difficult to assess due to suboptimal, off-axis imaging but is  probably unchanged from 12/4. Severely (EF <30%) reduced left ventricular  function is present. Diastolic function not assessed due to presence of LVAD.  Severe diffuse hypokinesis is present.     Right Ventricle  The right ventricle cannot be assessed.     Atria  The atria cannot be assessed.     Mitral Valve  The mitral valve is normal.        Aortic Valve  The aortic valve remains closed. There is mild continuous AI.     Tricuspid Valve  The tricuspid valve cannot be assessed. The peak velocity of the tricuspid  regurgitant jet is not obtainable. Pulmonary artery systolic pressure cannot  be assessed.     Pulmonic Valve  The pulmonic valve cannot be assessed.     Vessels  The aorta root cannot be assessed. The thoracic aorta cannot be assessed.  The  pulmonary artery cannot be assessed. The inferior vena cava cannot be  assessed.     Pericardium  No pericardial effusion is present.        Compared to Previous Study  This study was compared with the study from 12/4/20 . There has been no  signficant change. Specifically, LV size is probably unchanged and LV function  is unchanged and diffuse hypokinesis is unchanged; RV was poorly imaged on  today's study and cannot be compared.  _____________________________________________________________________________  __  MMode/2D Measurements & Calculations     IVSd: 0.91 cm  LVIDd: 5.9 cm  LVIDs: 5.9 cm  LVPWd: 0.97 cm  FS: 0.68 %  LV mass(C)d: 222.2 grams  LV mass(C)dI: 110.3 grams/m2  RWT: 0.33        Doppler Measurements & Calculations  PA acc time: 0.12 sec     _____________________________________________________________________________  __           Report approved by: Baron Bello 12/06/2020 11:19 AM      US Abdomen Limited    Narrative    ultrasound abdomen  12/6/2020 11:15 AM      History: Right upper quadrant. Worsening transaminitis.  Cholecystitis?.     COMPARISON: CT 12/3/20  FINDINGS:   Liver: Echogenic liver parenchyma measuring 16.3 cm. No focal mass.    Gallbladder: No cholelithiasis, pericholecystic fluid or gallbladder  wall thickening. Biliary sludge.    Biliary tree: The common bile duct measures 0.31 cm. No intrahepatic  biliary dilatation.    Right kidney: The right kidney measures 10.9 cm. No evidence of  hydronephrosis or focal mass.      Impression    IMPRESSION:   1. Hepatomegaly with echogenic liver parenchyma which can be seen with  intrinsic hepatic parenchyma disease.  2. Biliary sludge without evidence of cholelithiasis or cholecystitis.      I have personally reviewed the examination and initial interpretation  and I agree with the findings.    COLLEEN SANDOVAL MD

## 2020-12-07 NOTE — PROGRESS NOTES
[facility] CRITICAL CARE STAFF NOTE    Acute Critical Care Issues/Key Findings:     Danielito Chu remains critically ill due to acute hypoxic respiratory failure and ARDS secondary to COVID-19 pneumonia, with course complicated by acute on chronic systolic CHF, septic shock, oliguria, and LVAD outflow clot, and markedly elevated troponin, in the setting of a history of NICM s/p LVAD, ICD, DMII, CKDII, and GERD.     1. Acute hypoxic respiratory failure and ARDS secondary to COVID-19 pneumonia as well as fluid overload: intubated on 12/4. On Veletri. Not proned or paralyzed at this point. P/F is 144 this AM. CXR stable today. With plateau pressure of 23 on my exam, hypoxia may be more driven by CHF than ARDS as this point. Continue PEEP of 10 currently, however.   2. Acute on chronic systolic CHF, new right sided heart failure as well: attempting to wean PEEP as able to reduce R ventricular stress. Cardiology following, appreciate assistance. Continue Veletri. Stop MIVF, but given small boluses per cardiology today.   3. Respiratory alkalosis: over ventilating. Drop RR to 18.   4. Septic shock, possible drive line infection (purulent drainage may have previously been seen, though bedside RN denies this): on norepinephrine, wean as able.. Some concern for drive line infection vs bacterial translocation from gut. Continue zosyn and doxy, can stop vancomycin. Follow cultures.    4. NICM s/p LVAD, now with LVAD outflow clot: supratherapeutic INR, however near normal TEG. Change to high intensity heparin drip. Cardiology aware, and managing LVAD. Continue aspirin 81 mg daily. Holding home losartan, spironolactone, metoprolol.   5. Troponin elevation, new bradycardia: thought to be due to myocarditis, per cardiology. Heart cath not indicated, per cardiology. Would trend trops.   6. Hypernatremia: Monitor, increase FWF.   7. ICU sedation: fentanyl drip, start oxycodone in attempt to wean fentanyl drip.  RASS goal -1 to -2. Precedex as well.   8. Mesenteric thrombus: Continue heparin drip.   9. Elevated LFTs and lipase: starting to resolve. Monitor. Started during hypotensive episode, but also could have been secondary to remdesivir. RUQ US negative for cause. Monitor intermittently   10. Resolved CRUZ: Cr now 1. Improved.      The patient was seen and examined with the resident/fellow/NP team.  We have discussed the patient in detail and I agree with the findings, assessment, and plan as documented in resident/fellow Dr. Crystal's, note from today (December 7, 2020). Please see this note for additional details of physical exam, history, medications and labs.  I agree with assessment and plan as documented in said note, with main points listed above.   The plan was formulated in conjunction with pharmacy, ICU nurses, and respiratory therapist. I have personally reviewed today's vital signs, medications, laboratory and imaging results. I have reviewed all consults that have been ordered and are active for this patient.      Critical Care Time: 40 min.  I spent this time (excluding procedures) personally providing and directing critical care services at the bedside and on the critical care unit.      Date of Service (when I saw the patient): 12/07/20    Kyle Bridges MD    Department of Medicine, Division of Pulmonary, Allergy, Critical Care, and Sleep Medicine

## 2020-12-07 NOTE — PLAN OF CARE
4C PT: Cancel and hold; per RN pt with increased agitation when awake, not appropriate for PT intervention this date. Will follow peripherally and initiate as indicated.

## 2020-12-07 NOTE — PLAN OF CARE
4C OT: Cancel, per RN pt with increased agitation when awake, not appropriate for OT intervention this date.

## 2020-12-07 NOTE — PROGRESS NOTES
Major Shift Events:  Followed commands this AM, but otherwise agitated when awake. Vent settings weaned. Remains in sinus bradycardia, on low dose levo. Started very low intensity heparin gtt. LVAD numbers stable. TF advanced. Adequate urine output.     Plan: Wean vent and pressor as tolerated.       For vital signs and complete assessments, please see documentation flowsheets.

## 2020-12-08 NOTE — PROGRESS NOTES
Major Shift Events: LVAD flows trending down this AM. Team made aware. Tried fluid bolus and position change to no effect. Went down for CTA which showed worsening outflow graft thrombus. Flows now <2. Team discussing plan with CVTS. High intensity heparin started. Trending CVP and VBGs.     Plan: Possible surgical intervention. Continue to trend labs.     For vital signs and complete assessments, please see documentation flowsheets.

## 2020-12-08 NOTE — PROGRESS NOTES
MICU Brief Progress Note:    Discussed with Cardiology fellow regarding worsening LVAD outflow tract thrombus, now reportedly nearly occlusive at 95%.     There is some concern that the patient's current diagnosis of COVID-19 infection may be contributing to his progressive thrombus. Unfortunately, quantifying the degree of his hypercoagulability due to COVID-19 is challenging. There are currently no measures that have been shown to correlate well with risk of thromboembolic events in the setting of COVID-19. Many institutions, ours included, currently use D-dimer as a surrogate to dictate initial anticoagulation and thromboembolic prophylaxis strategies, though there is no exact correlation. The patient was admitted with a D-dimer of 1.3, which gabbi to 2.5, and has since been downtrending. We do have some alternative data for the patient, including a TEG (performed on 12/5) which was essentially normal. This was performed in the setting of the patient's supratherapeutic INR (5.76 on 12/5) suggesting that he was not coagulopathic despite his elevated INR, but does not lend information on whether he was hypercoagulable due to COVID-19.    Given his persistently elevated INR and urgency to achieve effective anticoagulation with his known thrombus, the decision was made to reverse his supratherapeutic INR with IV vitamin K while starting initially on low-intensity heparin gtt on 12/6. His INR corrected rapidly to 2.15. There certainly is the possibility that this correction, without high-intensity heparin may have led to progressive thrombus formation. The timeline to LVAD flow alarms and discovery of worsening thrombus correlates well.    Ultimately, it remains impossible to determine what contribution hypercoagulability related to COVID-19 has had on the progression of his outflow tract thrombus. Further, it unfortunately remains impossible to predict the risk of further thrombus formation should the tract be  surgically replaced. It also remains unclear whether therapeutic high-intensity heparin will be sufficient to prevent complete occlusion. There are reports of thrombembolic events despite therapeutic anticoagulation, though thus far, the patient does not appear to represent this phenotype. In addition, his pulmonary involvement and ARDS, which may or may not correlate with hypercoagulability, remains somewhat stable and moderate.    Plan:  - Continue high-intensity heparin gtt  - Cardiology colleagues to discuss case further with CVTS    Sukhdev Velez MD/PhD

## 2020-12-08 NOTE — PROVIDER NOTIFICATION
0100 After turn to left side, patient noted to have persistent hypoxia with sats in 80s and hypotension with maps in 50s.  FiO2 on vent increased to 100%, norepi started at 0.04.  MICU1 and RT called to bedside, Cards fellow updated by phone.  Hemodynamics and patient status reviewed.  Plan to let patient settle until 0300.  At 0300 will draw morning labs, turn patient, and attempt to wean FiO2 on vent.  If weaning of FiO2 is unsuccessful, team to reevaluate therapies for additional therapies.

## 2020-12-08 NOTE — PROGRESS NOTES
MEDICAL ICU PROGRESS NOTE  12/08/2020      Date of Service (when I saw the patient): 12/08/2020    ASSESSMENT: 64yo M with HM3 LVAD (12/2018) bridge to heart transplant, NICM, (LVEF at dong of 15%) s/p ICD (4/2017), DM2, CKD II, gout, GERD who was initially admitted to the ICU at [facility] on 12/3/2020 for evaluation of  fever, dyspnea and weakness and loss of appetite and transfer from [facility] ED for ARHF 2/2 COVID pneumonia 12/2, transferred to floor 12/3, who acutely decompensated with hypoxic respiratory failure requiring intubation 12/4, acute on chronic heart failure on LVAD, new oliguria, septic shock and LVAD thrombus, now with low flow LVAD alarms.     CHANGES and MAJOR THINGS TODAY:   - Start oxycodone 10mg q4h, try to wean fentanyl  - CXR today to evaluate lung fields; vent changes pending CXR results  - High intensity heparin gtt  - Continue scheduled Miralax; start scheduled Senna, add PRN suppository  - Increased free water flushes to 60mL  - Increased medium sliding scale insulin to high dose  - Stop vancomycin today  - Stop maintenance IV fluids; fluid goal net even  - Low flow LVAD alarms> 250mL LR + 500mL 5% albumin   - LVAD coordinator to assess    PLAN:     Neuro:  # Pain and sedation  Analgesia: Fentanyl gtt + PRN pushes; try to wean  Sedation: Precedex gtt  - Oxycodone 10mg q4h  - RASS goal -1/-2     # Adjustment disorder  Evaluated by psychiatry on 12/4, patient expressed anxiety about upcoming procedures. Declined acute treatment.  - Pending health psychology consult, deferred until extubated and alert.     Pulmonary:  # Acute hypoxemic respiratory failure 2/2 Severe COVID-19 infection c/b severe ARDS  Transfered to [facility] on 12/3 oxymask 6L sating 96% and was transferred to the floor. On 12/4 acutely decompensated requiring mechanical ventilation, intubated. CXR showing bilateral worsening infiltrates.   - CXR today to evaluate lung fields: stable to mildly worse bilateral infiltrates   - Goal to  decrease PEEP as able to reduce right heart strain  - Wean FiO2 to keep sats >92 and <95%  - Dexamethasone (12/3-12/12*)  - Stopped Remdesivir (12/3-12/5) given transaminitis  - Full dose flolan  - Abx as below     Ventilation Mode: CMV/AC  (Continuous Mandatory Ventilation/ Assist Control)  FiO2 (%): 50 %  Rate Set (breaths/minute): 20 breaths/min  Tidal Volume Set (mL): 440 mL  PEEP (cm H2O): 10 cmH2O  Oxygen Concentration (%): (S) 100 %  Resp: 20    Cardiovascular:  # Septic shock  Suspect sepsis and component of hypovolemia which is further worsened by his low CI/CO - suspect hypovolemic so will volume resus to improve pre-load. Considering obstructive given tachycardia, worsening respiratory status, new R sided heart failure and evidence of hypercoagulable state, DVT negative.   - Goal CVP 10-12  - Levophed gtt to keep MAPs >65    # ?Stress cardiomyopathy  # Elevated troponin (peaked at 24 on 12/6) secondary to myocarditis  Markedly elevated troponin to >20 on 12/5. TTE with poor evaluation of R side, L side unchanged. Cardiology evaluated patient on 12/6, suspect myocarditis.  - High intensity heparin gtt      # HFrEF 2/2 NICM s/p 3 LVAD as BTT on 12/5/2018, listed as status 7  # Biventricular heart failure  # s/p single chamber ICD (4/2017)  # Low flow alarms  TTE 12/4 revealed severe TR, increased RV size and severely reduced global RV function. LVEF <20%, normal LV size. Dry wt ~ 220-224. 225lb on admission.  LVAD with low flow alarms, 2.7 Lpm, CVP still 11 but given hypotension will fluid resuscitate  - Cardiology following, greatly appreciate assistance  - LVAD coordinator to eval  - 250 mL LR + 25g albumin  - Goal to decrease PEEP to minimal amount to decrease RV stress (reduced to 10)  - Hold metoprolol given new biventricular heart failure  - Holding spironolactone, losartan, bumex given acute HD decompensation.  - Trend INR  - High intensity heparin gtt, as above     # Outflow graft obstruction  US  12/3 and CT C/A/P shows non-occlusive thrombus throughout entirety of the LVAD outflow cannula with ~50% stenosis.   - CT surgery consulted: expectant management for outflow graft obstruction, to be considered for potential transplant after COVID 19     # Persistent atrial tachycardia  # H/o NSVT  S/P IV amiodarone (stopped 12/6).  - Hold PO metoprolol, as above  - Cardiology following, appreciate aid     # Lactic acidosis, improving  Likely in the setting of hypoperfusion from hypoxemia/hypotension. 5.5>2.3.     GI/Nutrition:  # Nutrition  - Tube feeds at goal  - Nutrition consulted, appreciate recs    # Constipation  - PRN suppository  - Scheduled Miralax and Senna    # Mesenteric thrombus  CT 12/4 revealed small amount of nonocclusive thrombus within the proximal.   - High intensity heparin gtt, as above    # Elevated LFTs  # Elevated lipase  Lipase elevated to 1262 however, but then normal on following measure. Feel at least one is spurious. Will continue with early TF tomorrow. AST//148 and worsening - could be 2/2 DILI (e.g. remdesivir) v. Component of hypoperfusion.   - trend LFTs  - stopped remdesivir 12/5AM  - s/p RUQ U/S (no cholecystitis)     Renal/Fluids/Electrolytes:  # Non-oliguric CRUZ, resolved  Baseline sCr 1.1-1.2>1.59 upon admission to ICU. Renal US without evidence of hydro and normal kidneys. CVP low at 5 suggesting hypovolemia,  FENa 0.2 suggesting pre-renal, and UA with hyaline casts further pointing to hypovolemia.   -Renal consult, appreciate recs  -Goal net even to mildly positive given c/f RV failure and need for preload  -Hold diuresis today unless markedly positive  -Avoid nephrotoxins  -Strict I/Os  -Daily weight     Endocrine:  # History of DM  No meds PTA. Last A1c 5.9%.  - High dose sliding scale insulin     ID:  # Severe sepsis  # C/f LVAD driveline infection  Febrile to 38.1C this AM, leukocytosis 10>18.4 overnight, ANC 16.1, new pressor requirement. Has remained with some  evidence of sepsis (febrile, remains on levophed), but workup unrevealing thus far and leukocytosis improving.  - Stop vancomycin today, no GPCs in culture  - Continue Zosyn   - Continue doxycycline    Antimicrobials:  -Ceftriaxone (12/3-12/5)  -Doxycycline (12/3-*)  -Vancomycin (12/5-12/7)  -Zosyn (12/5-*)     Micro:  LVAD driveline exit site (12/3, 12/5): Culture in progress.  Blood cx (12/3): NGTD  Blood cx (12/5): NGTD  Sputum: unable to collect thus far     Hematology:    # Supratherapeutic anticoagulation 2/2 LVAD, resolved  # COVID ppx  Holding warfarin and heparin gtt given INR 5.8. TEG with increased clot strength.    - INR and LDH trend   - High intensity heparin gtt, as above    Musculoskeletal:  # Gout  - continue PTA allopurinol  - HOLD PTA colchicine     Skin:  # No acute concerns     General Cares/Prophylaxis:    DVT Prophylaxis: Pneumatic Compression Devices  GI Prophylaxis: PPI  Restraints: None.  Family Communication: Updated by phone.  Code Status: Full code.     Lines/tubes/drains:  - ETT, brown, NG, A line, CVC, PIV     Disposition:  Critically ill in ICU, intubated and sedated. Likely >3 days.      Patient seen and findings/plan discussed with medical ICU staff, Dr. Bridges.    Everardo Crystal MD  Internal Medicine, PGY-1  Pager: [phone] text page    ====================================  INTERVAL HISTORY:     Significant troponin elevation yesterday-- peaked at 24. No acute events over night. Afebrile >24 hours. Initially was tachycardiac with rates in 130s, then abruptly on the evening of the 5th became bradycardiac with rates in 40s-50s. CVP 11 (goal 10-12). NE at 0.02. Intermittently following commands this morning.    P/F ratio 144.    No BMs yet since starting tube feeds.    Nursing notes reviewed.    OBJECTIVE:   1. VITAL SIGNS:   Temp:  [97  F (36.1  C)-99  F (37.2  C)] 99  F (37.2  C)  Pulse:  [] 64  Resp:  [20-25] 20  MAP:  [62 mmHg-91 mmHg] 88 mmHg  Arterial Line BP:  ()/(36-72) 163/45  FiO2 (%):  [50 %-100 %] 50 %  SpO2:  [85 %-100 %] 98 %  Ventilation Mode: CMV/AC  (Continuous Mandatory Ventilation/ Assist Control)  FiO2 (%): 50 %  Rate Set (breaths/minute): 20 breaths/min  Tidal Volume Set (mL): 440 mL  PEEP (cm H2O): 10 cmH2O  Oxygen Concentration (%): (S) 100 %  Resp: 20    2. INTAKE/ OUTPUT:   I/O last 3 completed shifts:  In: 4455.62 [I.V.:1870.62; NG/GT:685; IV Piggyback:250]  Out: 1510 [Urine:1510]    3. PHYSICAL EXAMINATION:  General: Sedated, male who appears stated age  HEENT: ETT and OG in place  Neuro: Sedated, opens eyes to voice, follows commands intermittently   Pulm/Resp: Clear breath sounds bilaterally without rhonchi, crackles or wheeze, breathing non-labored, synchronous to ventilator  CV: bradycardic, murmur from LVAD  Abdomen: Soft, non-distended, non-tender.  : brown catheter in place.  Ext: Cool, without significant edema  Incisions/Skin: delayed capillary refill.    4. LABS:   Arterial Blood Gases   Recent Labs   Lab 12/08/20  0827 12/08/20  0256 12/07/20  1216 12/07/20  0348   PH 7.41 7.47* 7.47* 7.42   PCO2 42 36 34* 38   PO2 97 87 67* 72*   HCO3 27 26 25 25     Complete Blood Count   Recent Labs   Lab 12/08/20  0256 12/07/20  1216 12/07/20  1023 12/07/20  0348   WBC 12.1* 13.7* 14.8* 11.9*   HGB 12.7* 13.4 14.1 14.3    457* 507* 474*     Basic Metabolic Panel  Recent Labs   Lab 12/08/20  0256 12/07/20  1216 12/07/20  0348 12/06/20  0353   * 148* 145* 143   POTASSIUM 4.2 3.9 3.9 3.9   CHLORIDE 117* 117* 114* 110*   CO2 25 24 23 23   BUN 26 30 30 29   CR 0.90 1.01 1.00 1.28*   * 154* 189* 162*     Liver Function Tests  Recent Labs   Lab 12/08/20  0256 12/08/20  0219 12/07/20  1216 12/07/20  0348 12/06/20  0353 12/05/20  0404   *  --  186* 201* 569* 367*   *  --  271* 307* 394* 148*   ALKPHOS 106  --  108 109 112 131   BILITOTAL 0.9  --  1.4* 1.0 1.8* 0.9   ALBUMIN 2.2*  --  2.0* 2.2* 2.3* 2.6*   INR  --  3.92*   --  2.15* 5.29* 5.76*     Coagulation Profile  Recent Labs   Lab 12/08/20  0608 12/08/20  0219 12/07/20  1023 12/07/20  0348 12/06/20  1412 12/06/20  0353 12/05/20  0404   INR  --  3.92*  --  2.15*  --  5.29* 5.76*   PTT 52* 128* 53*  --  42*  --   --        5. RADIOLOGY:   Recent Results (from the past 24 hour(s))   XR Chest Port 1 View    Narrative    EXAM: XR CHEST PORT 1 VW 12/7/2020 11:52 AM      HISTORY: Oxygenation.    COMPARISON: Previous day.     TECHNIQUE: Frontal view of the chest.    FINDINGS: Postsurgical changes of LVAD placement with median  sternotomy wires intact. Trachea is midline. ET tube is 3.7 cm from  the salomón. Enteric tube is subdiaphragmatic with tip collimated from  this exam. ICD device overlying the left chest. Left IJ CVC with tip  terminating at the brachiocephalic confluence. Stable cardiomegaly.  Stable elevated right hemidiaphragm. Stable bilateral diffuse  interstitial and airspace opacities. Left retrocardiac opacity.  Haziness overlying the bilateral costophrenic angles. No acute osseous  abnormality.      Impression    IMPRESSION:   1. Postsurgical changes of LVAD placement.  2. Stable bilateral diffuse interstitial and airspace opacities.  3. Stable support devices.    I have personally reviewed the examination and initial interpretation  and I agree with the findings.    ADAMS WILSON, DO   CTA Chest with Contrast    Narrative    Exam: Computed tomographic angiography of the chest without and with  contrast including 3D reformations dated 12/7/2020 5:13 PM    Clinical information: Other specified complication of other internal  prosthetic devices, implants and grafts; Known LVAD outflow thrombus.  Please reassess for progression    Technique: Helical scans through the chest obtained before the  administration of intravenous contrast media and following the  injection of contrast media in the arterial phase. Source images  reviewed as well as 3D and multi-planar  reconstructions.    Contrast: iopamidol (ISOVUE-370) solution 100 mL    DLP: 598 mGy*cm    Comparison: 2020    Findings:  There is interval increase in the nonocclusive thrombus which extends  throughout the entirety of the LVAD outflow cannula with now near  complete occlusion of the cannula adjacent to the aortic anastomosis.     Thoracic aortic diameters:    Sinuses of Valsalva: 3.3 cm, unchanged  Ascending aorta: 3.0 cm. Previously measurin.9 cm.  Aortic arch: 2.9 cm, unchanged  Proximal thoracic aorta: 2.8 cm, unchanged   Mid thoracic aorta: 2.7 cm, unchanged  Descending thoracic aorta: 2.5 cm, unchanged    Patent Bovine arch. Origins of the brachiocephalic artery, left common  carotid artery and left subclavian artery show no focal abnormality.  The proximal pulmonary vasculature appears normal. The celiac axis and  superior mesenteric arteries are patent. Decreased linear filling  defect within the proximal celiac artery which extends to the proximal  common hepatic artery. The splenic vein, portal vein and IVC are  patent.    Chest:   Mild layering debris within the trachea, otherwise the central  tracheal bronchial tree is patent. Endotracheal tube projects over the  midthoracic trachea. Left IJ CVC with tip projecting over the high  SVC. Increased  diffuse patchy groundglass opacities, interlobular  septal thickening, and scattered areas of dense consolidation with  relative sparing of the left apex and anterior right middle lobe.  Trace bilateral pleural effusions. Bibasilar dependent atelectasis.  Left chest wall implantable cardiac defibrillator. Cardiomegaly.  Normal caliber main pulmonary artery. Stable scattered prominent  mediastinal lymph nodes.     Upper abdomen:  Nodular irregular contour of the liver. Enteric tube seen within the  stomach.     Bones:  Multilevel degenerative changes of the spine. Stable compression  deformities of T4 and T7 with approximately 30% height loss.       Impression    Impression:  1. Interval increase in thrombus burden of the LVAD outflow cannula  with near total occlusion near the aortic anastomosis.  2. Increased diffuse patchy groundglass opacities, interlobular septal  thickening, and scattered dense consolidations consistent with known  COVID-19 pneumonia.    Imaging findings discussed with Dr. King by Dr. Trev Thomas at  5:52PM on 12/7/2020.    I have personally reviewed the examination and initial interpretation  and I agree with the findings.    CE STEINBERG   CT Head w/o Contrast    Narrative    CT HEAD W/O CONTRAST 12/7/2020 9:30 PM    Provided History: r/u embolic stroke  ICD-10:    Comparison: 7/24/2020.    Technique: Using multidetector thin collimation helical acquisition  technique, axial, coronal and sagittal CT images from the skull base  to the vertex were obtained without intravenous contrast.     Findings:      No intracranial hemorrhage. No mass effect. No midline shift. No  extra-axial fluid collection. The gray to white matter differentiation  of the cerebral hemispheres is preserved. Ventricles are proportionate  to the sulci. Diffuse generalized cerebral and cerebellar volume loss.  No sulcal effacement..  The basal cisterns are patent.    The visualized paranasal sinuses are clear. Positional plagiocephaly.  Small bilateral mastoid air cell effusions. Orbits appear  unremarkable. No acute fracture. Layering fluid in the posterior nasal  cavity.      Impression    Impression: No acute intracranial pathology.    I have personally reviewed the examination and initial interpretation  and I agree with the findings.    LUZ MARINA HODGE MD

## 2020-12-08 NOTE — PROGRESS NOTES
[facility] CRITICAL CARE STAFF NOTE    Acute Critical Care Issues/Key Findings:     Danielito Chu remains critically ill due to acute hypoxic respiratory failure and ARDS secondary to COVID-19 pneumonia, with course complicated by acute on chronic systolic CHF, septic shock, oliguria, and LVAD outflow clot, and markedly elevated troponin, in the setting of a history of NICM s/p LVAD, ICD, DMII, CKDII, and GERD.      1. Acute hypoxic respiratory failure and ARDS secondary to COVID-19 pneumonia as well as fluid overload: intubated on 12/4. On Veletr (for his right ventricular failure). Not proned or paralyzed at this point. P/F is 1616 this AM. With plateau pressure of 24. Given acceptable plateau, hypoxia may be more driven by cardiac cause rather than ARDS as this point. Continue PEEP of 10. Did require increase in FiO2 briefly last night, now improving. On dexamethasone, continue. Remdesivir was stopped due to transaminitis.   2. NICM s/p LVAD, with subtotal occlussive LVAD outflow clot: Continue bivalirudin drip, dipyridamole 25 mg TID,  mg daily. Cardiology assitance managing LVAD appreciated. Holding home losartan, spironolactone, metoprolol. Cardiology is discussing with cardiac surgery as well.    3. Acute on chronic systolic CHF, new right sided heart failure as well: attempting to limit PEEP as able to reduce R ventricular stress. Cardiology following, appreciate assistance. Continue Veletri for RV failure. Would aim for net negative fluid status today, give bumex 1 mg IV and assess fluid status, may need more-follow I/Os.   4. Shock state (probable cardiogenic, possible septic): on dobutamine, wean as able (on for CI <2). Mixed venous O2 sat was 54 overnight, now 73, improving with dobutamine. Hypotension is likely driven by cardiogenic shock, but there was some concern for drive line infection vs bacterial translocation from gut. Continue zosyn and doxy, may stop after 7 day  empiric course. Appreciate ID assistance. Follow cultures, NGTD.   5. Troponin elevation, new bradycardia: thought to be due to myocarditis and cath not indicated, per cardiology. Bradycardia improving.  6. Hypernatremia: Monitor, increase FWF.   7. ICU sedation: fentanyl drip, oxycodone in attempt to wean fentanyl drip. More agitated this AM, improved with increasing fentanyl. RASS goal -2 to -3. Precedex as well. Has PRN versed as well, can use drip if he has high requirements.   8. Mesenteric thrombus: Continue bivalirudin drip.   9. Elevated LFTs and lipase: Slowly improving. Monitor. Started during hypotensive episode, but also could have been secondary to remdesivir. RUQ US negative for cause. Monitor intermittently   10. Resolved CRUZ: Cr now 0.9. Improved.      The patient was seen and examined with the resident/fellow/NP team.  We have discussed the patient in detail and I agree with the findings, assessment, and plan as documented in resident/fellow Dr. Crystal's, note from today (December 8, 2020). Please see this note for additional details of physical exam, history, medications and labs.  I agree with assessment and plan as documented in said note, with main points listed above.     The plan was formulated in conjunction with pharmacy, ICU nurses, and respiratory therapist. I have personally reviewed today's vital signs, medications, laboratory and imaging results. I have reviewed all consults that have been ordered and are active for this patient.      Critical Care Time: 40 min.  I spent this time (excluding procedures) personally providing and directing critical care services at the bedside and on the critical care unit.      Date of Service (when I saw the patient): 12/08/20    Kyle Bridges MD    Department of Medicine, Division of Pulmonary, Allergy, Critical Care, and Sleep Medicine

## 2020-12-08 NOTE — PROGRESS NOTES
GENERAL ID SERVICE PROGRESS NOTE     Patient:  Danielito Chu   Date of birth 1955, Medical record number [id number]  Date of Visit:  12/08/2020  Date of Admission: 12/3/2020  Consult Requester:Windy Kauffman MD          Assessment and Recommendations:   ASSESSMENT: Jeff is a 64 y/o male with history of HFrEF s/p ICD 4/2017 and HeartMate III LVAD 12/2018; admitted 12/3 from [place] ED for Acute hypoxic respiratory failure secondary to COVID PNA. 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     Problems   1. Prior concern for LVAD drive line infection vs chronic drainage-  Hyperattenuation of soft tissue adjacent to drive line without fluid collection on CT. There is very low suspicion for drive line infection given site appears clean and dry and without erythema, purulent discharge or swelling.   2. Acute hypoxic respiratory failure 2/2 COVID PNA  3. LVAD outflow tract thrombous (50% stenosis)  4. Nonocclusive thrombus of proximal celiac artery     RECOMMENDATION:  1. Discontinue Doxycyline given no evidence of drive line infection   2. Can continue Zosyn for a total of 7 days of empiric course (end date: 12/11/20)  3. If patient develops drainage in the future, please obtain specimen for bacterial (aerobic and anaerobic) cultures prior to starting antibiotics.    - Prior culture: 12/05 aerobic and anaerobic culture from drive line exit site negative.      ID will sign off at this time. Thank you very much for allow us to participate in the care of Mr. Danielito Chu. Please page if further questions.     Patient was discussed with Dr. Bernstein.     Oly Price MD  Internal Medicine and Pediatrics, PGY1  Pager [phone]      PHYSICIAN ATTESTATIONS:  Patient seen and examined with resident. The ID resident note above reflects our joint assessment and plan, which I discussed with the resident in detail. LVAD site without any evidence of infection and DL cultures  remain negative to date. He has been on numerous courses of doxycycline since September for possible increasing drainage from LVAD DL site but we have no wound or blood cx to document an active infection. He has no history of bacteremia or chronically infected DL. At this time, I would favor stopping doxycycline and monitoring clinically while inpatient. If he develops drainage in future would recommend obtaining DL site cultures prior to starting abx.  We will do an empiric course of zosyn for 7 days total to cover for possible bacterial pneumonia.     Duy Bernstein MD  Infectious Disease  Pager [phone]  12/8/20       Interval History   Had persistent hypoxia to the 80s overnight and hypotension with maps in 50. FIO2 was increased to 100% and required NE to maintain MAPs. Throughout the night, FiO2 was slowly weaned down and NE was used on and off.     This AM: spoke with nurse who says he has been stable. Drive line dressing changed overnight without any drainage or infectious concerns.     12/8: Patient unable to be interviewed today due to intubation.          History of Present Illness:   Jeff is a 64 y/o male with history of HFrEF s/p ICD 2017 and LVAD 2018 admitted 12/3 from [place] ED for Acute hypoxic respiratory failure secondary to COVID PNA.     Jeff was feeling well until about 2 weeks ago when he started feeling short of breath. He was dyspneic on exertion and mildly at rest. He was also coughing at that time, productive of green sputum. He did not check his temperature. He progressively worsened and on the evening of 11/29 he had an episode of dizziness but that later resolved. On the morning of 12/1 he was not longer able to go about his normal daily activities which progressively worsened throughout the day and he presented to the ED.          Review of Systems:   Unable to perform due to mechanical ventilation.          Past Medical History:     Past Medical History:   Diagnosis Date      Acute systolic congestive heart failure (H) 2017     Diabetes (H)      HTN (hypertension)      Hyperlipidemia      Liver disease      LVAD (left ventricular assist device) present (H)     HM 3      Marijuana abuse      Mitral regurgitation      Nocturnal oxygen desaturation 3/29/2018     Nonischemic cardiomyopathy (H) 2017     SHIRLEY (obstructive sleep apnea)      Pacemaker 2017    ICD 17     Situational mixed anxiety and depressive disorder             Past Surgical History:     Past Surgical History:   Procedure Laterality Date     CV RIGHT HEART CATH N/A 2019    Procedure: CV RIGHT HEART CATH;  Surgeon: Jules Allan MD;  Location:  HEART CARDIAC CATH LAB     CV RIGHT HEART CATH N/A 2020    Procedure: CV RIGHT HEART CATH;  Surgeon: Jeremy Mckeon MD;  Location:  HEART CARDIAC CATH LAB     ESOPHAGOSCOPY, GASTROSCOPY, DUODENOSCOPY (EGD), COMBINED N/A 2018    Procedure: COMBINED ESOPHAGOSCOPY, GASTROSCOPY, DUODENOSCOPY (EGD);  Surgeon: Candido Mendez MD;  Location:  GI     IMPLANT IMPLANTABLE CARDIOVERTER DEFIBRILLATOR       INSERT VENTRICULAR ASSIST DEVICE LEFT (HEARTMATE II/III) MINIMALLY INVASIVE N/A 2018    Procedure: Partial Median Sternotomy, Left Thoracotomy, Minimally Invasive Left Ventricular Assist Device Placement (Heartmate III), On Cardiopulmonary Bypass;  Surgeon: Andrei Silva MD;  Location:  OR            Family History:   Reviewed and non-contributory.   Family History   Problem Relation Age of Onset     Heart Failure Father          at age 86     Heart Failure Paternal Uncle          at 66      Myocardial Infarction Paternal Uncle          at age 62     Coronary Artery Disease Mother          during CABG at age 41            Social History:     Social History     Tobacco Use     Smoking status: Former Smoker     Packs/day: 0.30     Years: 20.00     Pack years: 6.00     Types: Cigarettes     Smokeless  tobacco: Never Used     Tobacco comment: quit 3/2017   Substance Use Topics     Alcohol use: No     Frequency: Never     Comment: rare     History   Sexual Activity     Sexual activity: Not on file            Current Medications:       allopurinol  200 mg Oral Daily     aspirin  162 mg Oral or Feeding Tube BID     dexamethasone  6 mg Oral Q24H     dipyridamole  25 mg Oral or Feeding Tube TID     doxycycline hyclate  100 mg Oral Q12H VITALY     influenza recomb quadrivalent PF  0.5 mL Intramuscular Prior to discharge     insulin aspart  1-10 Units Subcutaneous Q4H     lidocaine  1 patch Transdermal Q24h    And     lidocaine   Transdermal Q8H     [Held by provider] losartan  25 mg Oral Daily     [Held by provider] metoprolol succinate ER  37.5 mg Oral Daily     oxyCODONE  10 mg Oral Q4H     pantoprazole  40 mg Oral QAM AC     piperacillin-tazobactam  4.5 g Intravenous Q6H     polyethylene glycol  17 g Oral BID     protein modular  1 packet Per Feeding Tube BID     senna-docusate  2 tablet Oral At Bedtime     [Held by provider] spironolactone  37.5 mg Oral Daily     thiamine  100 mg Oral Daily            Allergies:   No Known Allergies         Physical Exam:   Vitals were reviewed  Patient Vitals for the past 24 hrs:   Temp Temp src Pulse Resp SpO2 Weight   12/08/20 1045 -- -- 56 -- 93 % --   12/08/20 1030 -- -- 56 -- 93 % --   12/08/20 1015 -- -- 62 -- 93 % --   12/08/20 1000 -- -- 66 26 96 % --   12/08/20 0945 -- -- 77 -- 94 % --   12/08/20 0930 -- -- 68 -- 94 % --   12/08/20 0915 -- -- 64 -- 96 % --   12/08/20 0900 -- -- 64 20 98 % --   12/08/20 0845 -- -- 76 -- 99 % --   12/08/20 0840 -- -- 134 -- -- --   12/08/20 0830 -- -- 69 -- 99 % --   12/08/20 0815 -- -- 64 -- 99 % --   12/08/20 0800 99  F (37.2  C) Axillary 62 21 100 % --   12/08/20 0745 -- -- 64 -- 99 % --   12/08/20 0730 -- -- 63 -- 100 % --   12/08/20 0715 -- -- 61 -- 98 % --   12/08/20 0700 -- -- 59 20 99 % --   12/08/20 0630 -- -- 60 -- 99 % --   12/08/20  0615 -- -- 59 -- 99 % --   12/08/20 0600 -- -- 56 22 100 % --   12/08/20 0545 -- -- 64 -- 97 % --   12/08/20 0530 -- -- 67 -- 97 % --   12/08/20 0515 -- -- 53 -- 97 % --   12/08/20 0500 -- -- 51 20 94 % 92.1 kg (203 lb 0.7 oz)   12/08/20 0445 -- -- 51 -- 92 % --   12/08/20 0430 -- -- 61 -- 97 % --   12/08/20 0415 -- -- 56 -- 92 % --   12/08/20 0400 98.4  F (36.9  C) Axillary 50 20 95 % --   12/08/20 0345 -- -- (!) 49 -- 95 % --   12/08/20 0330 -- -- 50 -- 96 % --   12/08/20 0315 -- -- 52 -- 97 % --   12/08/20 0300 -- -- 53 25 98 % --   12/08/20 0245 -- -- 52 -- 98 % --   12/08/20 0230 -- -- 64 -- 98 % --   12/08/20 0215 -- -- 51 -- 98 % --   12/08/20 0200 -- -- 51 24 98 % --   12/08/20 0145 -- -- 64 -- 98 % --   12/08/20 0130 -- -- 57 -- 99 % --   12/08/20 0115 -- -- 55 -- 98 % --   12/08/20 0103 -- -- -- 24 -- --   12/08/20 0100 -- -- (!) 48 25 (!) 85 % --   12/08/20 0045 -- -- 64 -- 93 % --   12/08/20 0030 -- -- 52 -- 90 % --   12/08/20 0015 -- -- 51 -- 97 % --   12/08/20 0000 97  F (36.1  C) Axillary 51 20 95 % --   12/07/20 2345 -- -- 62 -- 96 % --   12/07/20 2330 -- -- 51 -- 94 % --   12/07/20 2315 -- -- 53 -- 94 % --   12/07/20 2300 -- -- 62 20 95 % --   12/07/20 2245 -- -- 51 -- 94 % --   12/07/20 2230 -- -- 53 -- 96 % --   12/07/20 2215 -- -- 53 -- 93 % --   12/07/20 2200 -- -- 52 20 93 % --   12/07/20 2153 -- -- -- -- 90 % --   12/07/20 2145 -- -- 51 -- 90 % --   12/07/20 2130 -- -- 122 -- 94 % --   12/07/20 2100 -- -- 124 20 97 % --   12/07/20 2045 -- -- 66 -- 96 % --   12/07/20 2032 -- -- -- -- 94 % --   12/07/20 2030 -- -- 67 -- 93 % --   12/07/20 2015 -- -- 66 -- 95 % --   12/07/20 2000 97.3  F (36.3  C) Axillary 68 20 95 % --   12/07/20 1945 -- -- 66 -- 95 % --   12/07/20 1930 -- -- 68 -- 95 % --   12/07/20 1915 -- -- 64 -- 96 % --   12/07/20 1900 -- -- 66 20 96 % --   12/07/20 1830 -- -- 66 -- 93 % --   12/07/20 1815 -- -- 64 -- 92 % --   12/07/20 1800 -- -- 53 -- 93 % --   12/07/20 1745 -- -- (!) 48  -- (!) 89 % --   12/07/20 1730 -- -- 53 -- (!) 89 % --   12/07/20 1715 -- -- 68 -- 97 % --   12/07/20 1615 -- -- 51 -- 91 % --   12/07/20 1600 98.5  F (36.9  C) Axillary 64 20 91 % --   12/07/20 1545 -- -- 59 -- 93 % --   12/07/20 1530 -- -- 53 -- 93 % --   12/07/20 1515 -- -- 65 -- 93 % --   12/07/20 1500 -- -- 64 -- 92 % --   12/07/20 1445 -- -- 68 -- 93 % --   12/07/20 1430 -- -- 56 -- 93 % --   12/07/20 1415 -- -- 55 -- 92 % --   12/07/20 1400 -- -- 52 -- 93 % --   12/07/20 1345 -- -- 55 -- 92 % --   12/07/20 1330 -- -- (!) 48 -- 92 % --   12/07/20 1315 -- -- 52 -- 92 % --   12/07/20 1300 -- -- (!) 48 -- 92 % --   12/07/20 1245 -- -- 52 -- 93 % --   12/07/20 1230 -- -- 50 -- 94 % --   12/07/20 1215 -- -- 51 -- 95 % --   12/07/20 1200 98.9  F (37.2  C) Axillary 50 20 94 % --   12/07/20 1130 -- -- 51 -- 94 % --       Physical Examination: Seen through clear doors. Room not entered.   General: Sedated and Intubated   Resp: on mechanical ventilation  CV: LVAD in place   Neuro: Sedated          Laboratory Data:     Inflammatory Markers    Recent Labs   Lab Test 12/07/20  0348 12/06/20  0353 12/05/20  0404 12/04/20  0540 12/03/20  1108 10/27/18  0605 10/24/18  0634   CRP 82.0* 150.0* 160.0* 190.0* 130.0* 3.1 5.0  4.4       Hematology Studies    Recent Labs   Lab Test 12/08/20  0256 12/07/20  1216 12/07/20  1023 12/07/20  0348 12/06/20  1642 12/06/20  0353 12/05/20  0404 12/05/20  0404 12/04/20  0540 12/03/20  1108 12/21/18  0906 12/21/18  0906 11/13/18  2119 11/13/18  2119 10/30/18  0514   WBC 12.1* 13.7* 14.8* 11.9* 13.2* 13.0*  --  18.4* 10.6 9.7   < > 13.4*   < > 6.4 7.9   ANEU  --   --   --   --   --   --   --  16.1* 8.8* 7.2  --  10.7*  --  4.2 4.4   AEOS  --   --   --   --   --   --   --  0.0 0.0 0.0  --  0.0  --  0.0 0.2   HGB 12.7* 13.4 14.1 14.3 13.9 14.8   < > 15.9 16.7 15.8   < > 10.1*   < > 17.1 17.9*    98 98 98 97 97  --  97 97 97   < > 96   < > 97 97    457* 507* 474* 461* 470*  --  503*  323 251   < > 601*   < > 165 210    < > = values in this interval not displayed.       Metabolic Studies     Recent Labs   Lab Test 12/08/20  0256 12/07/20  1216 12/07/20  0348 12/06/20  0353 12/05/20  1637   * 148* 145* 143 143   POTASSIUM 4.2 3.9 3.9 3.9 4.1   CHLORIDE 117* 117* 114* 110* 113*   CO2 25 24 23 23 21   BUN 26 30 30 29 25   CR 0.90 1.01 1.00 1.28* 1.37*   GFRESTIMATED 89 78 79 59* 54*       Hepatic Studies    Recent Labs   Lab Test 12/08/20  0256 12/07/20  1216 12/07/20  0348 12/06/20  0353 12/05/20  0404 12/04/20  2103   BILITOTAL 0.9 1.4* 1.0 1.8* 0.9 1.1   ALKPHOS 106 108 109 112 131 130   ALBUMIN 2.2* 2.0* 2.2* 2.3* 2.6* 2.4*   * 186* 201* 569* 367* 163*   * 271* 307* 394* 148* 47       Microbiology:  Culture Micro   Date Value Ref Range Status   12/05/2020 No growth after 3 days  Preliminary   12/05/2020 No growth after 3 days  Preliminary   12/05/2020 No growth  Final   12/04/2020 Culture negative monitoring continues  Preliminary   12/03/2020 No growth after 5 days  Preliminary   12/03/2020 No growth after 5 days  Preliminary   12/03/2020 Light growth  Normal skin hailey    Final   12/13/2018 No growth  Final   12/09/2018 No growth  Final   12/07/2018 (A)  Final    >10 Squamous epithelial cells/low power field indicates oral contamination. Please   recollect.     12/07/2018 Canceled, Test credited  Final   12/07/2018   Final    Notification of test cancellation was given to  Sigifredo Peck RN, @2233 12/07/18..     12/07/2018 No growth  Final   12/07/2018 No growth  Final   10/23/2018 No growth  Final   04/07/2017 No growth  Final       Urine Studies    Recent Labs   Lab Test 12/05/20  1327 12/04/20  2256 03/04/20  1400 04/26/19  1437 12/07/18  0801   LEUKEST Negative Negative Negative Negative Trace*   WBCU 9* 0 1 1 2       Vancomycin Levels  No lab results found.    Invalid input(s): VANCO    Hepatitis B Testing   Recent Labs   Lab Test 10/24/18  0634   HBCAB Nonreactive    HEPBANG Nonreactive     Hepatitis C Testing     Hepatitis C Antibody   Date Value Ref Range Status   07/24/2020 Nonreactive NR^Nonreactive Final     Comment:     Assay performance characteristics have not been established for newborns,   infants, and children     12/06/2019 Nonreactive NR^Nonreactive Final     Comment:     Assay performance characteristics have not been established for newborns,   infants, and children       Respiratory Virus Testing    No results found for: RS, FLUAG

## 2020-12-08 NOTE — PROGRESS NOTES
Major Shift Events:  Agitated with cares (causing hypotension, hypoxia, and lower flows on lvad) but responding to fentanyl boluses.  Slow to follow commands but will open eyes and squeeze hands.  Lungs diminished, required full ventilatory support for prolonged desat into 80s but now stable on original ordered vent settings and fio2 of 60%.  LVAD numbers continuously alarming low flows.  NE on and off to maintain maps, Palpable pulses, Dobutamine started.  Mixed venous drawn and improved after dobutamine starting.    Tolerating tube feeds at goal, turned q 2 to 3 hours for skin integrty- somewhat tenuous condition when turning to left.     Plan: Monitor LVAD numbers, treat coagultion problems.  For vital signs and complete assessments, please see documentation flowsheets.

## 2020-12-08 NOTE — CONSULTS
Health Psychology                  Clinic    Department of Medicine  Lilo Mccallum, Ph.D., L.P. [phone]                          [facility]  [facility] Evelyn Yost, Ph.D.,  L.P. [phone]                 3rd Floor  [place] Mail [id number]   Loretta Gayle, Ph.D., L.P. [phone]    [address] Kalli Chery, Ph.D., L.P. [phone]            [place] [address]          Kyle Oneill, Ph.D., A.B.P.P., L.P. [phone]      Roshni Pollard, Ph.D., L.P. [phone]      Inpatient Health Psychology Consultation*    Health Psychology was consulted by [facility] per patient's request on 12/4 to have someone to talk with about his concerns and anxiety regarding his health, probably upcoming surgery.    Later in the afternoon after that encounter with [facility] who put in the order, Mr Chu became increasingly SOB, transferred to ICU and intubated.    Health Psychology consult on hold as Mr Chu remains intubated.    Lilo Mccallum, PhD, LP    *In accordance with the Rules of the [place] [employer], it is noted that psychological descriptions and scientific procedures underlying psychological evaluations have limitations.  Absolute predictions cannot be made based on information in this report.

## 2020-12-08 NOTE — PROGRESS NOTES
GENERAL ID SERVICE PROGRESS NOTE     Patient:  Danielito Chu   Date of birth 1955, Medical record number [id number]  Date of Visit:  12/07/2020  Date of Admission: 12/3/2020  Consult Requester:Windy Kauffman MD          Assessment and Recommendations:   ASSESSMENT: Jeff is a 64 y/o male with history of HFrEF s/p ICD 4/2017 and HeartMate III LVAD 12/2018; admitted 12/3 from [place] ED for Acute hypoxic respiratory failure secondary to COVID PNA. 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     Problems  1. Prior concern for LVAD drive line infection vs chronic drainage-  Hyperattenuation of soft tissue adjacent to drive line without fluid collection on CT. There is very low suspicion for drive line infection given site appears clean and dry and without erythema, purulent discharge or swelling.   2. Acute hypoxic respiratory failure 2/2 COVID PNA  3. LVAD outflow tract thrombous (50% stenosis)  4. Nonocclusive thrombus of proximal celiac artery     RECOMMENDATION:  1. Agree with discontinuing Vancomycin   2. Continue Doxycyline for now possible drive line infection.   3. Continue Zosyn for now   2. Follow aerobic and anaerobic culture from drive line exit site. No growth to date.   4. Agree with holding remdemsivir in the setting of transaminatis    Thank you for this consult. ID will continue to follow. Thank you for allowing us to participate in his care.     Patient was discussed with Dr. Bernstein.     Oly Price MD  Internal Medicine and Pediatrics, PGY1    PHYSICIAN ATTESTATIONS:  Patient seen and examined with resident. The ID resident note above reflects our joint assessment and plan, which I discussed with the resident in detail. LVAD site without any evidence of infection and DL cultures remain negative to date. He has been on numerous courses of doxycycline since September for possible increasing drainage from LVAD DL site but we have no wound or  blood cx to document an active infection. At this time, would start de-escalating abx in step wise fashion eventually stop if no source of infection isolated.    Duy Bernstein MD  Infectious Disease  Pager [phone]  12/7/20       Interval History   Jeff decompensated on 1/4 requiring intubation and transfer to the ICU. Given concern for septic shock and requiring pressors, ceftriaxone was discontinued and vanc/zosyn were started.     12/7: Patient unable to be interviewed today due to intubation.          History of Present Illness:   Jeff is a 64 y/o male with history of HFrEF s/p ICD 2017 and LVAD 2018 admitted 12/3 from [place] ED for Acute hypoxic respiratory failure secondary to COVID PNA.     Jeff was feeling well until about 2 weeks ago when he started feeling short of breath. He was dyspneic on exertion and mildly at rest. He was also coughing at that time, productive of green sputum. He did not check his temperature. He progressively worsened and on the evening of 11/29 he had an episode of dizziness but that later resolved. On the morning of 12/1 he was not longer able to go about his normal daily activities which progressively worsened throughout the day and he presented to the ED.          Review of Systems:   Unable to perform due to mechanical ventilation.          Past Medical History:     Past Medical History:   Diagnosis Date     Acute systolic congestive heart failure (H) 4/5/2017     Diabetes (H)      HTN (hypertension)      Hyperlipidemia      Liver disease      LVAD (left ventricular assist device) present (H)      3 12/18     Marijuana abuse      Mitral regurgitation      Nocturnal oxygen desaturation 3/29/2018     Nonischemic cardiomyopathy (H) 4/5/2017     SHIRLEY (obstructive sleep apnea)      Pacemaker 04/07/2017    ICD 4/7/17     Situational mixed anxiety and depressive disorder             Past Surgical History:     Past Surgical History:   Procedure Laterality Date     CV RIGHT  HEART CATH N/A 2019    Procedure: CV RIGHT HEART CATH;  Surgeon: Jules Allan MD;  Location:  HEART CARDIAC CATH LAB     CV RIGHT HEART CATH N/A 2020    Procedure: CV RIGHT HEART CATH;  Surgeon: Jeremy Mckeon MD;  Location:  HEART CARDIAC CATH LAB     ESOPHAGOSCOPY, GASTROSCOPY, DUODENOSCOPY (EGD), COMBINED N/A 2018    Procedure: COMBINED ESOPHAGOSCOPY, GASTROSCOPY, DUODENOSCOPY (EGD);  Surgeon: Candido Mendez MD;  Location:  GI     IMPLANT IMPLANTABLE CARDIOVERTER DEFIBRILLATOR       INSERT VENTRICULAR ASSIST DEVICE LEFT (HEARTMATE II/III) MINIMALLY INVASIVE N/A 2018    Procedure: Partial Median Sternotomy, Left Thoracotomy, Minimally Invasive Left Ventricular Assist Device Placement (Heartmate III), On Cardiopulmonary Bypass;  Surgeon: Andrei Silva MD;  Location:  OR            Family History:   Reviewed and non-contributory.   Family History   Problem Relation Age of Onset     Heart Failure Father          at age 86     Heart Failure Paternal Uncle          at 66      Myocardial Infarction Paternal Uncle          at age 62     Coronary Artery Disease Mother          during CABG at age 41            Social History:     Social History     Tobacco Use     Smoking status: Former Smoker     Packs/day: 0.30     Years: 20.00     Pack years: 6.00     Types: Cigarettes     Smokeless tobacco: Never Used     Tobacco comment: quit 3/2017   Substance Use Topics     Alcohol use: No     Frequency: Never     Comment: rare     History   Sexual Activity     Sexual activity: Not on file            Current Medications:       allopurinol  200 mg Oral Daily     aspirin  81 mg Oral Daily     dexamethasone  6 mg Oral Q24H     doxycycline hyclate  100 mg Oral Q12H VITALY     insulin aspart  1-10 Units Subcutaneous Q4H     lidocaine  1 patch Transdermal Q24h    And     lidocaine   Transdermal Q8H     [Held by provider] losartan  25 mg Oral Daily     [Held by  provider] metoprolol succinate ER  37.5 mg Oral Daily     oxyCODONE  10 mg Oral Q4H     pantoprazole  40 mg Oral QAM AC     piperacillin-tazobactam  4.5 g Intravenous Q6H     polyethylene glycol  17 g Oral Daily     protein modular  1 packet Per Feeding Tube BID     senna-docusate  1 tablet Oral At Bedtime     sodium phosphate  15 mmol Intravenous Once     [Held by provider] spironolactone  37.5 mg Oral Daily     thiamine  100 mg Oral Daily            Allergies:   No Known Allergies         Physical Exam:   Vitals were reviewed  Patient Vitals for the past 24 hrs:   Temp Temp src Pulse Resp SpO2 Weight   12/07/20 1900 -- -- -- 20 -- --   12/07/20 1830 -- -- 66 -- 93 % --   12/07/20 1815 -- -- 64 -- 92 % --   12/07/20 1800 -- -- 53 -- 93 % --   12/07/20 1745 -- -- (!) 48 -- (!) 89 % --   12/07/20 1730 -- -- 53 -- (!) 89 % --   12/07/20 1715 -- -- 68 -- 97 % --   12/07/20 1615 -- -- 51 -- 91 % --   12/07/20 1600 98.5  F (36.9  C) Axillary 64 20 91 % --   12/07/20 1545 -- -- 59 -- 93 % --   12/07/20 1530 -- -- 53 -- 93 % --   12/07/20 1515 -- -- 65 -- 93 % --   12/07/20 1500 -- -- 64 -- 92 % --   12/07/20 1445 -- -- 68 -- 93 % --   12/07/20 1430 -- -- 56 -- 93 % --   12/07/20 1415 -- -- 55 -- 92 % --   12/07/20 1400 -- -- 52 -- 93 % --   12/07/20 1345 -- -- 55 -- 92 % --   12/07/20 1330 -- -- (!) 48 -- 92 % --   12/07/20 1315 -- -- 52 -- 92 % --   12/07/20 1300 -- -- (!) 48 -- 92 % --   12/07/20 1245 -- -- 52 -- 93 % --   12/07/20 1230 -- -- 50 -- 94 % --   12/07/20 1215 -- -- 51 -- 95 % --   12/07/20 1200 98.9  F (37.2  C) Axillary 50 20 94 % --   12/07/20 1130 -- -- 51 -- 94 % --   12/07/20 1115 -- -- 50 -- 96 % --   12/07/20 1100 -- -- 51 -- 95 % --   12/07/20 1045 -- -- 50 -- 95 % --   12/07/20 1030 -- -- 51 -- 94 % --   12/07/20 1015 -- -- (!) 49 -- 95 % --   12/07/20 1000 -- -- 52 -- 95 % --   12/07/20 0945 -- -- 50 -- 95 % --   12/07/20 0930 -- -- (!) 49 -- 94 % --   12/07/20 0915 -- -- (!) 49 -- 94 % --    12/07/20 0900 -- -- (!) 48 -- 94 % --   12/07/20 0845 -- -- 51 -- 94 % --   12/07/20 0830 -- -- 50 -- 93 % --   12/07/20 0815 -- -- (!) 49 -- 93 % --   12/07/20 0800 99.3  F (37.4  C) Axillary 52 20 92 % --   12/07/20 0745 -- -- 75 -- 94 % --   12/07/20 0730 -- -- 52 -- 95 % --   12/07/20 0715 -- -- 52 -- 93 % --   12/07/20 0700 -- -- 53 20 94 % --   12/07/20 0645 -- -- 56 -- 92 % --   12/07/20 0630 -- -- 60 -- 93 % --   12/07/20 0615 -- -- 53 -- 94 % --   12/07/20 0600 -- -- 50 20 93 % --   12/07/20 0545 -- -- (!) 48 -- 94 % --   12/07/20 0530 -- -- 52 -- 94 % --   12/07/20 0515 -- -- 52 -- 94 % --   12/07/20 0500 -- -- 52 20 94 % --   12/07/20 0445 -- -- 53 -- 94 % --   12/07/20 0430 -- -- 50 -- 92 % --   12/07/20 0415 -- -- 53 -- 95 % --   12/07/20 0400 98.1  F (36.7  C) Axillary (!) 49 20 95 % --   12/07/20 0345 -- -- 52 -- 95 % --   12/07/20 0330 -- -- 52 -- 95 % --   12/07/20 0315 -- -- 51 -- 95 % --   12/07/20 0300 -- -- 51 20 95 % --   12/07/20 0245 -- -- 51 -- 95 % --   12/07/20 0230 -- -- 53 -- 94 % --   12/07/20 0215 -- -- 52 -- 93 % --   12/07/20 0200 -- -- 50 20 95 % --   12/07/20 0145 -- -- (!) 48 -- 95 % --   12/07/20 0130 -- -- (!) 47 -- 95 % --   12/07/20 0115 -- -- (!) 49 -- 96 % --   12/07/20 0100 -- -- (!) 48 20 95 % --   12/07/20 0045 -- -- (!) 49 -- 95 % --   12/07/20 0030 -- -- (!) 49 -- 95 % --   12/07/20 0015 -- -- (!) 49 -- 96 % --   12/07/20 0000 97.9  F (36.6  C) Axillary (!) 48 20 96 % 89.4 kg (197 lb 1.5 oz)   12/06/20 2345 -- -- 50 -- 95 % --   12/06/20 2330 -- -- 53 -- 95 % --   12/06/20 2315 -- -- 53 -- 95 % --   12/06/20 2300 -- -- 52 20 95 % --   12/06/20 2245 -- -- 52 -- 95 % --   12/06/20 2230 -- -- 53 -- 95 % --   12/06/20 2215 -- -- (!) 48 -- 96 % --   12/06/20 2200 -- -- 53 20 97 % --   12/06/20 2145 -- -- (!) 49 -- 96 % --   12/06/20 2130 -- -- (!) 48 -- 96 % --   12/06/20 2115 -- -- (!) 49 -- 96 % --   12/06/20 2100 -- -- (!) 49 20 96 % --   12/06/20 2045 -- -- (!) 49 --  95 % --   12/06/20 2030 -- -- (!) 49 -- 95 % --   12/06/20 2015 -- -- (!) 49 -- 94 % --   12/06/20 2000 97.5  F (36.4  C) Axillary 51 20 94 % --       Physical Examination:  GENERAL: In bed, intubated    HEENT:  Head is normocephalic, atraumatic   EYES:  Eyes have anicteric sclerae without conjunctival injection or stigmata of endocarditis.    NECK:  Supple. No cervical lymphadenopathy  LUNGS: on mechanical ventilation. Coarse lung sounds throughout   CARDIOVASCULAR:  LVAD hum  ABDOMEN:  Normal bowel sounds, soft, nontender.   SKIN:  Skin near LVAD drive line has brownish pigmentation however there are no signs of erythema, swelling or drainage.   NEUROLOGIC: Sedated          Laboratory Data:     Inflammatory Markers    Recent Labs   Lab Test 12/07/20  0348 12/06/20  0353 12/05/20  0404 12/04/20  0540 12/03/20  1108 10/27/18  0605 10/24/18  0634   CRP 82.0* 150.0* 160.0* 190.0* 130.0* 3.1 5.0  4.4       Hematology Studies    Recent Labs   Lab Test 12/07/20  1216 12/07/20  1023 12/07/20  0348 12/06/20  1642 12/06/20  0353 12/05/20  1637 12/05/20  0404 12/04/20  0540 12/03/20  1108 12/21/18  0906 12/21/18  0906 11/13/18  2119 11/13/18  2119 10/30/18  0514   WBC 13.7* 14.8* 11.9* 13.2* 13.0*  --  18.4* 10.6 9.7   < > 13.4*   < > 6.4 7.9   ANEU  --   --   --   --   --   --  16.1* 8.8* 7.2  --  10.7*  --  4.2 4.4   AEOS  --   --   --   --   --   --  0.0 0.0 0.0  --  0.0  --  0.0 0.2   HGB 13.4 14.1 14.3 13.9 14.8 15.0 15.9 16.7 15.8   < > 10.1*   < > 17.1 17.9*   MCV 98 98 98 97 97  --  97 97 97   < > 96   < > 97 97   * 507* 474* 461* 470*  --  503* 323 251   < > 601*   < > 165 210    < > = values in this interval not displayed.       Metabolic Studies     Recent Labs   Lab Test 12/07/20  1216 12/07/20  0348 12/06/20  0353 12/05/20  1637 12/05/20  0404   * 145* 143 143 139   POTASSIUM 3.9 3.9 3.9 4.1 4.4   CHLORIDE 117* 114* 110* 113* 109   CO2 24 23 23 21 21   BUN 30 30 29 25 22   CR 1.01 1.00 1.28* 1.37*  1.45*   GFRESTIMATED 78 79 59* 54* 50*       Hepatic Studies    Recent Labs   Lab Test 12/07/20  1216 12/07/20  0348 12/06/20  0353 12/05/20  0404 12/04/20  2103 12/03/20  1108   BILITOTAL 1.4* 1.0 1.8* 0.9 1.1 1.2   ALKPHOS 108 109 112 131 130 124   ALBUMIN 2.0* 2.2* 2.3* 2.6* 2.4* 2.6*   * 201* 569* 367* 163* 39   * 307* 394* 148* 47 27       Microbiology:  Culture Micro   Date Value Ref Range Status   12/05/2020 No growth after 2 days  Preliminary   12/05/2020 No growth after 2 days  Preliminary   12/05/2020 No growth  Final   12/04/2020 Culture negative monitoring continues  Preliminary   12/03/2020 No growth after 4 days  Preliminary   12/03/2020 No growth after 4 days  Preliminary   12/03/2020 Light growth  Normal skin hailey    Final   12/13/2018 No growth  Final   12/09/2018 No growth  Final   12/07/2018 (A)  Final    >10 Squamous epithelial cells/low power field indicates oral contamination. Please   recollect.     12/07/2018 Canceled, Test credited  Final   12/07/2018   Final    Notification of test cancellation was given to  Sigifredo Peck RN, @2233 12/07/18..     12/07/2018 No growth  Final   12/07/2018 No growth  Final   10/23/2018 No growth  Final   04/07/2017 No growth  Final       Urine Studies    Recent Labs   Lab Test 12/05/20  1327 12/04/20  2256 03/04/20  1400 04/26/19  1437 12/07/18  0801   LEUKEST Negative Negative Negative Negative Trace*   WBCU 9* 0 1 1 2       Vancomycin Levels  No lab results found.    Invalid input(s): VANCO    Hepatitis B Testing   Recent Labs   Lab Test 10/24/18  0634   HBCAB Nonreactive   HEPBANG Nonreactive     Hepatitis C Testing     Hepatitis C Antibody   Date Value Ref Range Status   07/24/2020 Nonreactive NR^Nonreactive Final     Comment:     Assay performance characteristics have not been established for newborns,   infants, and children     12/06/2019 Nonreactive NR^Nonreactive Final     Comment:     Assay performance characteristics have not been  established for newborns,   infants, and children       Respiratory Virus Testing    No results found for: RS, FLUAG

## 2020-12-08 NOTE — CONSULTS
Post LVAD Patient Social Work Assessment     Patient Name: Danielito Chu  : 1955  Age: 65 year old  MRN: [id number]  Date of LVAD: 2018    Patient known to me from follow up in the LVAD program. He had been on the waitlist for heart transplant for 1.5 years prior to admission. He is currently on hold due to COVID.  Admitted on 12/3/20 for COVID.  Intubated and in the ICU. Spoke to briefly via phone on Friday.    Presenting Information   Living Situation[place] to upper level and many stairs [place]. Also owns a home in [place]  Functional Status: Had been independent with ADLs prior to admission. Was driving and completing household tasks  Cultural/Language/Spiritual Considerations: None identified    Support System  Primary Support Person Sister-Fani and Karlfriend-Yanna  Other support:  Other siblings  Plan for support in immediate post-hospital period: Unsure-will need to continue to assess needs    Health Care Directive  Decision Maker: Siblings  Alternate Decision Maker: Has identified Sister Fani in the past as his designated decision-maker  Health Care Directive: Has been given education and blank documents in the past. Reports he completed one during LVAD stay. However, no current document on file    Mental Health/Coping:   History of Mental Health: Denies any history of MH issues  History of Chemical Health: Recreational Marijuana use in the past  Current status: N/A  Coping: Usually displays good coping skills. Unable to assess due to illness and intubation  Services Needed/Recommended: Will need to continue to assess. Notice there was a psychology consult placed    Financial   Income: Had been on long-term disability through employer and had recently started receiving SSDI 6-7 months ago.   Impact of transplant on income: Minimal  Insurance and medication coverage: [employer]/Medicare  Financial concerns: Has not had any  in the past.  Resources needed: Will need to assess    Discharge Plan   Patient and family discharge goal: Will want to discharge home when medically ready  Barriers to discharge: Intubation in ICU due to COVID    Education provided by SW: Social Work role inpatient setting    Assessment and recommendations and plan:  Writer familiar with [facility] from history of LVAD and previous hospitalizations. Currently in ICU intubated for COVID. Also having issues with LVAD thrombus. SW will continue to follow for ongoing psychosocial support and assistance with discharge planning when appropriate.

## 2020-12-08 NOTE — PROGRESS NOTES
MEDICAL ICU PROGRESS NOTE  12/08/2020      Date of Service (when I saw the patient): 12/08/2020    ASSESSMENT: 64yo M with HM3 LVAD (12/2018) bridge to heart transplant, NICM, (LVEF at dong of 15%) s/p ICD (4/2017), DM2, CKD II, gout, GERD who was initially admitted to the ICU at [facility] on 12/3/2020 for evaluation of  fever, dyspnea and weakness and loss of appetite and transfer from [facility] for ARHF 2/2 COVID pneumonia 12/2, transferred to floor 12/3, who acutely decompensated with hypoxic respiratory failure requiring intubation 12/4, acute on chronic biventricular heart failure on LVAD, worsening LVAD thrombus.     CHANGES and MAJOR THINGS TODAY:   - Started on dobutamine  - Transitioned to bivalirudin and increased ASA  - Follow coag workup today  - Versed gtt, to be used if cannot sedate appropriately with fentanyl and precedex  - Liberalize precedex with holding parameters for bradycardia  - Start PO oxycodone  - RASS to -2 to -3  - No vent changes today  - Goal of net -500 today, diurese PRN  - discontinue doxycycline  - continue zosyn for 7d course  - TTE today, per cardiology    PLAN:     Neuro:  # Pain and sedation  Analgesia: Fentanyl gtt + PRN pushes  Sedation: Precedex gtt, versed gtt available (try to utilize PRNs)  - Oxycodone 10mg q4h  - RASS goal -2/-3     # Adjustment disorder  Evaluated by psychiatry on 12/4, patient expressed anxiety about upcoming procedures. Declined acute treatment.  - Pending health psychology consult, deferred until extubated and alert.     Pulmonary:  # Acute hypoxemic respiratory failure 2/2 Severe COVID-19 infection c/b severe ARDS  Transfered to [facility] on 12/3 oxymask 6L sating 96% and was transferred to the floor. On 12/4 acutely decompensated requiring mechanical ventilation, intubated. CXR showing bilateral worsening infiltrates on 12/7. Stopped Remdesivir (12/3-12/5) given transaminitis  - Goal to decrease PEEP as able to reduce right heart strain, no changes for  today  - Wean FiO2 to keep sats >92 and <95%  - Dexamethasone (12/3-12/12*)  - Full dose flolan  - Abx as below      Ventilation Mode: CMV/AC  (Continuous Mandatory Ventilation/ Assist Control)  FiO2 (%): 50 %  Rate Set (breaths/minute): 20 breaths/min  Tidal Volume Set (mL): 440 mL  PEEP (cm H2O): 10 cmH2O  Oxygen Concentration (%): 60 %  Resp: 23    Cardiovascular:  # Myocarditis  # Elevated troponin (peaked at 24 on 12/6) secondary to myocarditis  Markedly elevated troponin to >20 on 12/5. EKG without changes suggestive for ischemia.  TTE with poor evaluation of R side, L side unchanged. Cardiology evaluated patient on 12/6, suspect myocarditis.  - Anticoagulation as below  - DAPLT: ASA 162mg + dipyrdamole 25 TID      # HFrEF 2/2 NICM s/p 3 LVAD as BTT on 12/5/2018, listed as status 7  # Biventricular heart failure  # s/p single chamber ICD (4/2017)  # Low flow alarms  TTE 12/4 revealed severe TR, increased RV size and severely reduced global RV function. LVEF <20%, normal LV size. Dry wt ~ 220-224. 225lb on admission.  LVAD with low flow alarms 12/7, 2.4~Lpm, no response with fluid resus, found to have worsening thrombus burden. He has been hemodynamically stable off pressors, has normal right sided filling pressures, increased pulse pressure, and low normal cardiac index.    - Cardiology following, greatly appreciate assistance  - Goal CVP 8-12  - Levophed gtt to keep MAPs >65  - dobutamine if CI < 2, SvO2 q6hr   - Improving LVAD flows today (2.4>2.7 in PM)  - TTE today  - Goal to decrease PEEP to minimal amount to decrease RV stress   - Hold metoprolol given new biventricular heart failure  - Bumex 1mg IV this AM  - Goal of net -500 today  - Holding spironolactone, losartan, bumex given acute HD decompensation  - Anticoagulation as below     # Outflow graft obstruction  Incidentally found LVAD outflow graft obstruction found on CT C/A/P; throughout entirety of the LVAD outflow cannula with ~50% stenosis.  Unclear if patient developed acutely in hospital or prior to admission, INR was elevated but TEG was largely normal pointing against hypocoagulable state. Low flow alarms 12/7, CTA revealed worsening stenosis to ~95% on 12/7.   - CT surgery consulted: expectant management for outflow graft obstruction, to be considered for potential transplant after COVID 19 *ongoing discussion between cardiology and CT surgery given complex situation*  - Anticoagluation as below  - DAPT as above     # Persistent atrial tachycardia  # H/o NSVT  S/P IV amiodarone (stopped 12/6). Intermittently elvira- and tachycardic. Stable blood pressure throughout.   - Hold PO metoprolol, as above  - Cardiology following, appreciate aid     # Lactic acidosis, improved  Likely in the setting of hypoperfusion from hypoxemia/hypotension. 5.5>2.3.     GI/Nutrition:  # Nutrition  - Tube feeds at goal  - Nutrition consulted, appreciate recs    # Bowel regimen  - PRN suppository  - Scheduled Miralax and Senna    # Mesenteric thrombus  CT 12/4 revealed small amount of nonocclusive thrombus within the proximal.     # Elevated LFTs  Lipase elevated to 1262 however, but then normalx2 following, will not follow further. AST//148 and worsening - could be 2/2 DILI (e.g. remdesivir) v. Component of hypoperfusion or congestive hepatopathy (suspect component as worsening after fluid resuscitation).   - trend LFTs  - stopped remdesivir 12/5AM  - s/p RUQ U/S (no cholecystitis)     Renal/Fluids/Electrolytes:  # Non-oliguric CRUZ, resolved  Baseline sCr 1.1-1.2>1.59 upon admission to ICU. Renal US without evidence of hydro and normal kidneys. CVP low at 5 suggesting hypovolemia,  FENa 0.2 suggesting pre-renal, and UA with hyaline casts further pointing to hypovolemia.   -Renal consulted, signed off  -Avoid nephrotoxins  -Strict I/Os  -Daily weight    #Hypernatremia  Calculated free water deficit 2.8L.   - increase FWF to 200mL q4hr     Endocrine:  # History of  DM  No meds PTA. Last A1c 5.9%.  - High dose sliding scale insulin     ID:  # Severe sepsis  Febrile to 38.1C this AM, leukocytosis 10>18.4 12/5, ANC 16.1 and developed new pressor requirement. Workup unrevealing thus far and leukocytosis improving. Unclear if true infection vs. Immune response 2/2 multiple thrombi  - Infectious disease consulted, appreciate recs  - Continue Zosyn   - discontinue doxycycline    Antimicrobials:  -Ceftriaxone (12/3-12/5)  -Doxycycline (12/3-12/8)  -Vancomycin (12/5-12/7)  -Zosyn (12/5-*)     Micro:  LVAD driveline exit site (12/3, 12/5): Culture in progress, NGTD  Blood cx (12/3): NGTD  Blood cx (12/5): NGTD  Sputum: unable to collect thus far     Hematology:    # LVAD outflow tract thrombus  # Supratherapeutic anticoagulation on warfarin  # COVID ppx  Holding warfarin and heparin gtt given INR 5.8. TEG with increased clot strength otherwise largely normal. Given vit K 12/6, started on low intensity heparin, increased to high intensity heparin 12/7. CT head negative for acute pathology. Transitioned to bivalirudin 12/7.   - continue Bivalirudin   - hypercoagulable workup    > factor 8   > risotocetin   > vWB factor   > IL6    > TEG   > Platelet inhibition  - PTT and LDH trend    Musculoskeletal:  # Gout  - continue PTA allopurinol  - HOLD PTA colchicine     Skin:  # No acute concerns     General Cares/Prophylaxis:    DVT Prophylaxis: Pneumatic Compression Devices  GI Prophylaxis: PPI  Restraints: None.  Family Communication: Updated by phone  Code Status: Full code.     Lines/tubes/drains:  - ETT, brown, NG, A line, CVC, PIV     Disposition:  Critically ill in ICU, intubated and sedated. Likely >3 days.      Patient seen and findings/plan discussed with medical ICU staff, Dr. Bridges.    Everardo Crystal MD  Internal Medicine, PGY-1  Pager: [phone] text page    ====================================  INTERVAL HISTORY:   Episode of hypotension and deep desats last night, slow recovery and now  back to 60% FiO2. Started on dobutamine for low CI. 5 minutes of SVT during agitation this AM, requiring multiple team members to restrain patient.     OBJECTIVE:   1. VITAL SIGNS:   Temp:  [97  F (36.1  C)-99  F (37.2  C)] 99  F (37.2  C)  Pulse:  [] 64  Resp:  [20-25] 20  MAP:  [62 mmHg-91 mmHg] 88 mmHg  Arterial Line BP: ()/(36-72) 163/45  FiO2 (%):  [50 %-100 %] 50 %  SpO2:  [85 %-100 %] 98 %  Ventilation Mode: CMV/AC  (Continuous Mandatory Ventilation/ Assist Control)  FiO2 (%): 50 %  Rate Set (breaths/minute): 20 breaths/min  Tidal Volume Set (mL): 440 mL  PEEP (cm H2O): 10 cmH2O  Oxygen Concentration (%): (S) 100 %  Resp: 20    2. INTAKE/ OUTPUT:   I/O last 3 completed shifts:  In: 4455.62 [I.V.:1870.62; NG/GT:685; IV Piggyback:250]  Out: 1510 [Urine:1510]    3. PHYSICAL EXAMINATION:  General: Sedated, male who appears stated age  HEENT: ETT and OG in place  Neuro: Sedated, opens eyes to voice, follows commands intermittently   Pulm/Resp: Clear breath sounds bilaterally without rhonchi, crackles or wheeze, breathing non-labored, synchronous to ventilator  CV: bradycardic, murmur from LVAD  Abdomen: Soft, non-distended, non-tender.  : brown catheter in place.  Ext: Cool, without significant edema  Incisions/Skin: 2 second capillary refill.    4. LABS:   Arterial Blood Gases   Recent Labs   Lab 12/08/20  0827 12/08/20  0256 12/07/20  1216 12/07/20  0348   PH 7.41 7.47* 7.47* 7.42   PCO2 42 36 34* 38   PO2 97 87 67* 72*   HCO3 27 26 25 25     Complete Blood Count   Recent Labs   Lab 12/08/20  0256 12/07/20  1216 12/07/20  1023 12/07/20  0348   WBC 12.1* 13.7* 14.8* 11.9*   HGB 12.7* 13.4 14.1 14.3    457* 507* 474*     Basic Metabolic Panel  Recent Labs   Lab 12/08/20  0256 12/07/20  1216 12/07/20  0348 12/06/20  0353   * 148* 145* 143   POTASSIUM 4.2 3.9 3.9 3.9   CHLORIDE 117* 117* 114* 110*   CO2 25 24 23 23   BUN 26 30 30 29   CR 0.90 1.01 1.00 1.28*   * 154* 189* 162*      Liver Function Tests  Recent Labs   Lab 12/08/20  0256 12/08/20  0219 12/07/20  1216 12/07/20  0348 12/06/20  0353 12/05/20  0404   *  --  186* 201* 569* 367*   *  --  271* 307* 394* 148*   ALKPHOS 106  --  108 109 112 131   BILITOTAL 0.9  --  1.4* 1.0 1.8* 0.9   ALBUMIN 2.2*  --  2.0* 2.2* 2.3* 2.6*   INR  --  3.92*  --  2.15* 5.29* 5.76*     Coagulation Profile  Recent Labs   Lab 12/08/20  0608 12/08/20  0219 12/07/20  1023 12/07/20  0348 12/06/20  1412 12/06/20  0353 12/05/20  0404   INR  --  3.92*  --  2.15*  --  5.29* 5.76*   PTT 52* 128* 53*  --  42*  --   --        5. RADIOLOGY:   Recent Results (from the past 24 hour(s))   XR Chest Port 1 View    Narrative    EXAM: XR CHEST PORT 1 VW 12/7/2020 11:52 AM      HISTORY: Oxygenation.    COMPARISON: Previous day.     TECHNIQUE: Frontal view of the chest.    FINDINGS: Postsurgical changes of LVAD placement with median  sternotomy wires intact. Trachea is midline. ET tube is 3.7 cm from  the salomón. Enteric tube is subdiaphragmatic with tip collimated from  this exam. ICD device overlying the left chest. Left IJ CVC with tip  terminating at the brachiocephalic confluence. Stable cardiomegaly.  Stable elevated right hemidiaphragm. Stable bilateral diffuse  interstitial and airspace opacities. Left retrocardiac opacity.  Haziness overlying the bilateral costophrenic angles. No acute osseous  abnormality.      Impression    IMPRESSION:   1. Postsurgical changes of LVAD placement.  2. Stable bilateral diffuse interstitial and airspace opacities.  3. Stable support devices.    I have personally reviewed the examination and initial interpretation  and I agree with the findings.    ADAMS WILSON, DO   CTA Chest with Contrast    Narrative    Exam: Computed tomographic angiography of the chest without and with  contrast including 3D reformations dated 12/7/2020 5:13 PM    Clinical information: Other specified complication of other internal  prosthetic  devices, implants and grafts; Known LVAD outflow thrombus.  Please reassess for progression    Technique: Helical scans through the chest obtained before the  administration of intravenous contrast media and following the  injection of contrast media in the arterial phase. Source images  reviewed as well as 3D and multi-planar reconstructions.    Contrast: iopamidol (ISOVUE-370) solution 100 mL    DLP: 598 mGy*cm    Comparison: 2020    Findings:  There is interval increase in the nonocclusive thrombus which extends  throughout the entirety of the LVAD outflow cannula with now near  complete occlusion of the cannula adjacent to the aortic anastomosis.     Thoracic aortic diameters:    Sinuses of Valsalva: 3.3 cm, unchanged  Ascending aorta: 3.0 cm. Previously measurin.9 cm.  Aortic arch: 2.9 cm, unchanged  Proximal thoracic aorta: 2.8 cm, unchanged   Mid thoracic aorta: 2.7 cm, unchanged  Descending thoracic aorta: 2.5 cm, unchanged    Patent Bovine arch. Origins of the brachiocephalic artery, left common  carotid artery and left subclavian artery show no focal abnormality.  The proximal pulmonary vasculature appears normal. The celiac axis and  superior mesenteric arteries are patent. Decreased linear filling  defect within the proximal celiac artery which extends to the proximal  common hepatic artery. The splenic vein, portal vein and IVC are  patent.    Chest:   Mild layering debris within the trachea, otherwise the central  tracheal bronchial tree is patent. Endotracheal tube projects over the  midthoracic trachea. Left IJ CVC with tip projecting over the high  SVC. Increased  diffuse patchy groundglass opacities, interlobular  septal thickening, and scattered areas of dense consolidation with  relative sparing of the left apex and anterior right middle lobe.  Trace bilateral pleural effusions. Bibasilar dependent atelectasis.  Left chest wall implantable cardiac defibrillator. Cardiomegaly.  Normal  caliber main pulmonary artery. Stable scattered prominent  mediastinal lymph nodes.     Upper abdomen:  Nodular irregular contour of the liver. Enteric tube seen within the  stomach.     Bones:  Multilevel degenerative changes of the spine. Stable compression  deformities of T4 and T7 with approximately 30% height loss.      Impression    Impression:  1. Interval increase in thrombus burden of the LVAD outflow cannula  with near total occlusion near the aortic anastomosis.  2. Increased diffuse patchy groundglass opacities, interlobular septal  thickening, and scattered dense consolidations consistent with known  COVID-19 pneumonia.    Imaging findings discussed with Dr. King by Dr. Trev Thomas at  5:52PM on 12/7/2020.    I have personally reviewed the examination and initial interpretation  and I agree with the findings.    CE STEINBERG   CT Head w/o Contrast    Narrative    CT HEAD W/O CONTRAST 12/7/2020 9:30 PM    Provided History: r/u embolic stroke  ICD-10:    Comparison: 7/24/2020.    Technique: Using multidetector thin collimation helical acquisition  technique, axial, coronal and sagittal CT images from the skull base  to the vertex were obtained without intravenous contrast.     Findings:      No intracranial hemorrhage. No mass effect. No midline shift. No  extra-axial fluid collection. The gray to white matter differentiation  of the cerebral hemispheres is preserved. Ventricles are proportionate  to the sulci. Diffuse generalized cerebral and cerebellar volume loss.  No sulcal effacement..  The basal cisterns are patent.    The visualized paranasal sinuses are clear. Positional plagiocephaly.  Small bilateral mastoid air cell effusions. Orbits appear  unremarkable. No acute fracture. Layering fluid in the posterior nasal  cavity.      Impression    Impression: No acute intracranial pathology.    I have personally reviewed the examination and initial interpretation  and I agree with the  findings.    LUZ MARINA HODGE MD

## 2020-12-09 NOTE — PLAN OF CARE
Major Shift Events: LVAD flows better this morning at 3-3.1. See flowsheet for other LVAD values. Diuresis per MICU with 2mg Bumex and 5mg metolazone. Pt very responsive with almost 4L UOP. MAPs dropped to hover right at 65, HR 90s, and flows decreased slightly to 2.8. 500mL LR bolus given, MAPs back to 70s. No LVAD alarms. Scheduled PO ativan started today and precedex weaned off. Pt followed commands once this afternoon during sedation vacation. Fent gtt decreased to 50. Followed commands again this evening. Boluses of Fent and Versed used PRN. Increased FiO2 from 50 to 60% this AM for sats 89-90%. ABGs and VBGs drawn x2. CXR x2.  Per MICU plan is to increase PEEP to 12 and decrease FiO2 back to 60% tonight and redraw ABG in AM. Large watery BM this afternoon, rectal pouch placed. Potassium and magnesium replaced this evening.   Plan: Monitor respiratory status.   For vital signs and complete assessments, please see documentation flowsheets.

## 2020-12-09 NOTE — CONSULTS
[facility]     Cardiology Progress Note- Cards 2        Date of Admission:  12/3/2020     Assessment & Plan: SL   Danielito Chu is a 65 year old male admitted on 12/3/2020 with COVID pneumonia. He has a history of NICM (LVEF at dong of 15%) s/p ICD (4/2017), who underwent HM3 LVAD implant on 12/5/18 as DT, now BTT. He has a history of atrial arrythmia, s/p ICD, on amiodarone and metoprolol. He was admitted as transfer to MICU from [place] ED for AHRF 2/2 COVID pneumonia. 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 Over the course of hospitalization, his respiratory status progressively deteriorated and he was ultimately transferred back to the MICU and emergently intubated. He then developed an elevated troponin (peak 24.782) without ischemic EKG changes, concerning for myocarditis>ACS/coronary embolism. He then developed progressive decline of LVAD flow and was found to have sub-total occlusion of the LVAD outflow graft on repeat CTA. Started on dobutamine and maintaining adequate hemodynamics. Repeat TTE shows aortic valve opening with each beat. Given CXR findings, increasing FiO2, and worsening paO2, there is concern for worsening of his COVID pneumonia/ARDS.     Today's Recommendations:  - continue asa 162mg BID, dipyridamole 25mg TID  - continue bivalirudin, goal PTT 60-80  - TEG and platelet function assay daily  - trend SvO2 q6hr, CVP q6hr  - gentle diuresis as patient is very pre-load dependent given RV dysfunction, would target CVP 8-10 and then give maintenance diuretic to keep even fluid balance  - would consider low dose hydralazine if MAPs persistently >90     #NICM s/p LVAD 12/5/18  #Myocarditis  #RV Dysfunction  #Cardiogenic Shock  - continue to hold PTA spironolactone and losartan given tenuous hemodynamics  - consider low dose hydralazine if MAPs consistently >90  - gentle diuresis  given preload dependent state, maintain CVP 8-10 and even fluid balance  - telemetry monitoring for ventricular dysrrythmias  - pending LDH isoenzymes  - trend LFTs   - trend SvO2 q6h  - continue to trend CVP    #Covid Pneumonia/ARDS  #Acute hypoxic respiratory failure  Increasing FiO2 requirement, worsening opacities on CXR  - appreciate cares per primary medical team  - on dexamethasone  - remdesivir held 2/2 transaminitis     #COVID coagulopathy  #Supratherapuetic INR, reversed  #LVAD outflow thrombus  - continue asa 162mg BID, dipyridamole 25mg TID  - continue bivalirudin, goal PTT 60-80  - TEG and platelet function daily  - ASA now assay: therapuetic  - trend LDH     #Persistent atrial tachycardia  #Sinus Bradycardia  - continue to hold amiodarone  - continue to hold metoprolol    #LVAD driveline exudate  - cultures NGTD  - appreciate ID recommendations  - continue antibiotics for a total of 7 days of coverage    Entered: Chico Ventura MD 12/09/2020, 7:06 AM       The patient's care was discussed with the Attending Physician, Dr. Weems, Bedside Nurse and Primary team.    Chico Ventura MD   Cardiology Fellow  Pager: [phone]  Amcom: [phone]  [facility]   Please see sign in/sign out for up to date coverage information  ______________________________________________________________________    Interval History   LVAD flows slightly improved overnight. Increased FiO2 requirement. CXR with worsening opacities. Hemodynamically remains stable.     Data reviewed today: I reviewed all medications, new labs and imaging results over the last 24 hours.    Physical Exam   Vital Signs: Temp: 99.5  F (37.5  C) Temp src: Axillary  Pulse: 65   Resp: 20 SpO2: 92 % O2 Device: Mechanical Ventilator    Weight: 207 lbs 7.25 oz  General Appearance:  Sedated and intubated  Respiratory: intubated and mechanically ventilated, poor air movement over the bases, no crackles  Cardiovascular: LVAD  hum  GI: soft, ND, hypoactive BS  Skin: warm,  no rash  Ext: warm to touch, trace edema, easily palpable pulses in the bilateral radials  Neuro: sedated, no spontaneous movements     Data   Recent Labs   Lab 12/09/20  1533 12/09/20  0356 12/08/20  0256 12/08/20  0219 12/07/20  1216 12/07/20  0348 12/07/20  0348 12/06/20  1412 12/06/20  1412 12/06/20  0808 12/06/20  0353   WBC  --  13.3* 12.1*  --  13.7*   < > 11.9*   < >  --   --  13.0*   HGB  --  12.6* 12.7*  --  13.4   < > 14.3   < >  --   --  14.8   MCV  --  101* 100  --  98   < > 98   < >  --   --  97   PLT  --  422 396  --  457*   < > 474*   < >  --   --  470*   INR  --   --   --  3.92*  --   --  2.15*  --   --   --  5.29*    145* 147*  --  148*  --  145*  --   --   --  143   POTASSIUM 3.7 4.3 4.2  --  3.9  --  3.9  --   --   --  3.9   CHLORIDE 105 113* 117*  --  117*  --  114*  --   --   --  110*   CO2 30 27 25  --  24  --  23  --   --   --  23   BUN 25 24 26  --  30  --  30  --   --   --  29   CR 0.92 0.84 0.90  --  1.01  --  1.00  --   --   --  1.28*   ANIONGAP 7 5 5  --  7  --  7  --   --   --  10   ASHLEE 8.6 8.3* 8.0*  --  8.4*  --  8.3*  --   --   --  8.6   * 172* 132*  --  154*  --  189*  --   --   --  162*   ALBUMIN  --  2.4* 2.2*  --  2.0*  --  2.2*  --   --   --  2.3*   PROTTOTAL  --  5.8* 5.6*  --  5.7*  --  6.0*  --   --   --  6.2*   BILITOTAL  --  0.8 0.9  --  1.4*  --  1.0  --   --   --  1.8*   ALKPHOS  --  112 106  --  108  --  109  --   --   --  112   ALT  --  173* 224*  --  271*  --  307*  --   --   --  394*   AST  --  69* 133*  --  186*  --  201*  --   --   --  569*   LIPASE  --   --   --   --   --   --  133  --   --  116  --    TROPI  --   --   --   --   --   --   --   --  22.609* 24.782* 21.830*    < > = values in this interval not displayed.     Recent Results (from the past 24 hour(s))   XR Chest Port 1 View   Result Value    Radiologist flags Potential free intraperitoneal air (Urgent)    Narrative    EXAM: XR CHEST PORT 1 VW  12/9/2020 11:06 AM      HISTORY: f/u.    COMPARISON: Chest CT dated 12/7/2020. Chest x-ray dated 12/7/2020 12/6/2020.     TECHNIQUE: Frontal view of the chest.    FINDINGS: Postsurgical changes of LVAD placement. Middle median  sternotomy wire appears fractured. ET tube is 4.2 cm from the salomón.  ICD device overlying the left chest. Left IJ CVC with tip in the high  SVC. Stable elevated right hemidiaphragm. Cardiomediastinal silhouette  is obscured. Stable right greater than left diffuse interstitial and  airspace opacities. No pneumothoraces. Left retrocardiac opacity.  Haziness overlying the left costophrenic angle. No acute osseous  process. Under the right hemidiaphragm there is some lucency. This  just may be the the deep posterior sulcus of the lung. Giving this  appearance. Alternatively this could be free air.      Impression    IMPRESSION:   1. Postsurgical changes with LVAD placement.  2. Minimal medial and sternotomy wires fractured.  3. Stable right greater than left diffuse interstitial and airspace  opacities.  4. Left pleural effusion with associated atelectasis.  5. Stable support devices.   6. Lucency under the right hemidiaphragm on a semiupright film. Likely  just summation of shadows. Consider free intraperitoneal air  clinically. Decubitus film would be more definitive.    [Urgent Result: Potential free intraperitoneal air]    Finding was identified on 12/9/2020 12:46 PM.   Cyril Velez was contacted by Dr. Bolaños at 12/9/2020 1:31 PM and  verbalized understanding of the urgent finding.     I have personally reviewed the examination and initial interpretation  and I agree with the findings.    DIVINA ABDALLA MD   XR Chest Port 1 View    Narrative    EXAM: XR CHEST PORT 1 VW  12/9/2020 5:09 PM     HISTORY:  f/u       COMPARISON:  12/9/2020    FINDINGS: Single view of the chest. LVAD. Left hemithorax cardiac  device in similar position. Median sternotomy wires. Stable  positioning of left IJ central  venous catheter and endotracheal tube.  The cardiac silhouette is partially obscured. Low lung volumes.  Bilateral mixed opacities appear slightly improved. No pneumothorax.  Stable small left pleural effusion.      Impression    IMPRESSION:   1. Stable support devices.  2. Slightly improved bilateral mixed lung opacities.  3. Stable small left pleural effusion.    I have personally reviewed the examination and initial interpretation  and I agree with the findings.    DIVINA ABDALLA MD     Medications     bivalirudin ANTICOAGULANT (ANGIOMAX) 250mg/250mL in 0.9% Sodium Chloride 0.156 mg/kg/hr (12/09/20 1900)     dexmedetomidine Stopped (12/09/20 1220)     dextrose       DOBUTamine 2.5 mcg/kg/min (12/09/20 1900)     epoprostenol (VELETRI) 20 mcg/mL in sterile water inhalation solution 20 ng/kg/min (12/09/20 1758)     fentaNYL 50 mcg/hr (12/09/20 1900)     - MEDICATION INSTRUCTIONS -       midazolam       - MEDICATION INSTRUCTIONS -         allopurinol  200 mg Oral Daily     aspirin  162 mg Oral or Feeding Tube BID     dexamethasone  6 mg Oral Q24H     dipyridamole  25 mg Oral or Feeding Tube TID     influenza recomb quadrivalent PF  0.5 mL Intramuscular Prior to discharge     insulin aspart  1-10 Units Subcutaneous Q4H     lidocaine  1 patch Transdermal Q24h    And     lidocaine   Transdermal Q8H     LORazepam  4 mg Oral Q6H     [Held by provider] losartan  25 mg Oral Daily     [Held by provider] metoprolol succinate ER  37.5 mg Oral Daily     oxyCODONE  10 mg Oral Q4H     pantoprazole  40 mg Oral QAM AC     piperacillin-tazobactam  4.5 g Intravenous Q6H     polyethylene glycol  17 g Oral BID     protein modular  1 packet Per Feeding Tube BID     senna-docusate  2 tablet Oral At Bedtime     [Held by provider] spironolactone  37.5 mg Oral Daily     thiamine  100 mg Oral Daily

## 2020-12-09 NOTE — PLAN OF CARE
Major Shift Events: Patient became agitated this morning attempting to pull at ETT and sit up in bed, sedation orders and PRNs updated and RASS goal now -2 to -3. Restraints in place. Had one episode of SVT with rates in 130s when agitated, self resolved in about 5 minutes.  Levophed stayed off throughout shift with MAPs maintaining >65. Dobutamine continued at 2.5/hour. LVAD flow numbers remain low at 2.4-2.7. See flowsheet for other values. CVP 7 and 9, team updated, no diuresis today. FiO2 weaned to 50%, PEEP still at 10. SVO2 74 at 1000 and 63 at 1600. ECHO completed.   Plan: Awaiting plan for LVAD obstruction intervention, anticoagulate for now.   For vital signs and complete assessments, please see documentation flowsheets.

## 2020-12-09 NOTE — PLAN OF CARE
Major Shift Events:  Intermittent agitation but much more comfortable this shift with prn versed bumps and fentanyl increases.  Inconsistently and slowly following commands.  LVAD numbers stable, 1 low flow alarm that stopped with repositioning.  (see chart for hemodynamics).  Breathing unlabored, brief desat to upper 80s requiring increased fio2 but stable at original settings.  Voiding, tolerating feeds.  Adjusting bival levels as indicated.    Plan: Continue to monitor LVAD numbers, maintain hemodynamics and appropriate sedation to optimize patient condition.  For vital signs and complete assessments, please see documentation flowsheets.

## 2020-12-09 NOTE — PROGRESS NOTES
MEDICAL ICU PROGRESS NOTE  12/09/2020      Date of Service (when I saw the patient): 12/09/2020    ASSESSMENT: 64yo M with HM3 LVAD (12/2018) bridge to heart transplant, NICM, (LVEF at dong of 15%) s/p ICD (4/2017), DM2, CKD II, gout, GERD who was initially admitted to the ICU at [facility] on 12/3/2020 for evaluation of  fever, dyspnea and weakness and loss of appetite and transfer from [place] ED for ARHF 2/2 COVID pneumonia 12/2, transferred to floor 12/3, who acutely decompensated with hypoxic respiratory failure requiring intubation 12/4, acute on chronic biventricular heart failure on LVAD, and new LVAD thrombus.     CHANGES and MAJOR THINGS TODAY:   - Start ativan 4mg q6hr  - Wean precedex as able -- unclear if contributing significantly to sedation regimen  - Repeat ABG -- if appears to not correlate, will consider titrating to sats  - diuresis with goal of net -500 (bumex 2mg and metolazone 5mg now)  - Zosyn to continue through 12/11  - CXR appear worse with R>L disease, repeat given question of lucency under diaphragm  - increase FiO2 to 80% and repeat ABG 1 hour later for further eval of oxygenation status as ABG is not correlating with SpO2s.   - BMP/Mg given significant UOP  - 500 mL bolus in PM given significant UOP and decreasing MAPs    PLAN:     Neuro:  # Pain and sedation  Analgesia: Fentanyl gtt + PRN pushes  Sedation: Precedex gtt (attempt to wean today), versed PRNs  - Oxycodone 10mg q4h  - Ativan 4mg q6h  - RASS goal -2/-3     # Adjustment disorder  Evaluated by psychiatry on 12/4, patient expressed anxiety about upcoming procedures. Declined acute treatment.  - Pending health psychology consult, deferred until extubated and alert.     Pulmonary:  # Acute hypoxemic respiratory failure 2/2 Severe COVID-19 infection c/b severe ARDS  Transfered to [facility] on 12/3 oxymask 6L sating 96% and was transferred to the floor. On 12/4 acutely decompensated requiring mechanical ventilation, intubated. CXR showing  bilateral worsening infiltrates on 12/7. Stopped Remdesivir (12/3-12/5) given transaminitis.   - Goal to keep PEEP to minimal amount to decrease RV stress   - Concern re: oxygenation as SpO2 and ABG have not aligned - will increase FiO2 to 80% and repeat ABG 1 hour later for further eval of oxygenation status. If improvement noted, will plan to increase PEEP to 12  - Unclear pulmonary prognosis at this time given complexity co-morbidities; He is approximately 9 days into his COVID course thus far and it is too soon to speculate on his future respiratory requirement.  His AHRF is likely significantly due to COVID but difficult to ascertain the degree to which his new and significant RHF are also contributing to his respiratory status. His CXR on 12/9 has worsening bilateral infiltrates and his PaO2 has been declining suggesting worsening respiratory function.  - Dexamethasone (12/3-12/12*)  - Full dose flolan  - Abx as below      Ventilation Mode: CMV/AC  (Continuous Mandatory Ventilation/ Assist Control)  FiO2 (%): 60 %  Rate Set (breaths/minute): 20 breaths/min  Tidal Volume Set (mL): 440 mL  PEEP (cm H2O): 10 cmH2O  Oxygen Concentration (%): 60 %  Resp: 20    Cardiovascular:  # Myocarditis  # Elevated troponin (peaked at 24 on 12/6) secondary to myocarditis  Markedly elevated troponin to >20 on 12/5. EKG without changes suggestive for ischemia.  TTE with poor evaluation of R side, L side unchanged. Cardiology evaluated patient on 12/6, suspect myocarditis.  - Anticoagulation as below  - DAPLT: ASA 162mg + dipyrdamole 25 TID      # HFrEF 2/2 NICM s/p 3 LVAD as BTT on 12/5/2018, listed as status 7  # Biventricular heart failure  # s/p single chamber ICD (4/2017)  # Low flow alarms  TTE 12/4 revealed severe TR, increased RV size and severely reduced global RV function. LVEF <20%, normal LV size. Dry wt ~ 220-224. 225lb on admission.  LVAD with low flow alarms 12/7, 2.4~Lpm, no response with fluid resus, found to have  worsening thrombus burden. He has been hemodynamically stable off pressors, has normal right sided filling pressures, increased pulse pressure, and low normal cardiac index.  TTE 12/8 with slightly improved RV function and aortic valve opening with each systolic beat.   - Cardiology following, greatly appreciate assistance  - Goal CVP 8-12  - dobutamine if CI < 2, SvO2 q6hr   - Improving LVAD flows today (2.8-3 LPM today)  - Hold metoprolol given new biventricular heart failure  - Bumex 2mg IV this AM  - Goal of net -500 today  - Holding spironolactone, losartan, bumex given acute HD decompensation  - Anticoagulation as below     # Outflow graft obstruction  Incidentally found LVAD outflow graft obstruction found on CT C/A/P; throughout entirety of the LVAD outflow cannula with ~50% stenosis. Unclear if patient developed acutely in hospital or prior to admission, INR was elevated but TEG was largely normal pointing against hypocoagulable state. Low flow alarms 12/7, CTA revealed worsening stenosis to ~95% on 12/7.   - CT surgery consulted: expectant management for outflow graft obstruction, to be considered for potential transplant after COVID 19 *ongoing discussion between cardiology and CT surgery given complex situation, based on current discussions the plan is to await improved respiratory function (extubation) prior to proceeding with LVAD exchange*  - Anticoagluation as below  - DAPT as above     # Persistent atrial tachycardia  # H/o NSVT  S/P IV amiodarone (stopped 12/6). Intermittently elvira- and tachycardic. Stable blood pressure throughout.   - Hold PO metoprolol, as above  - Cardiology following, appreciate aid     # Lactic acidosis, improved  Likely in the setting of hypoperfusion from hypoxemia/hypotension. 5.5>2.3.     GI/Nutrition:  # Nutrition  - Tube feeds at goal  - Nutrition consulted, appreciate recs    # Bowel regimen  - PRN suppository  - Scheduled Miralax and Senna    # Mesenteric  thrombus  CT 12/4 revealed small amount of nonocclusive thrombus within the proximal.     # Elevated LFTs, improving  Lipase elevated to 1262 however, but then normalx2 following, will not follow further. AST//148 and worsening - could be 2/2 DILI (e.g. remdesivir) v. Component of hypoperfusion or congestive hepatopathy (suspect component as worsening after fluid resuscitation).   - trend LFTs  - stopped remdesivir 12/5AM  - s/p RUQ U/S (no cholecystitis)     Renal/Fluids/Electrolytes:  # Non-oliguric CRUZ, resolved  Baseline sCr 1.1-1.2>1.59 upon admission to ICU. Renal US without evidence of hydro and normal kidneys. CVP low at 5 suggesting hypovolemia,  FENa 0.2 suggesting pre-renal, and UA with hyaline casts further pointing to hypovolemia.   -Renal consulted, signed off  -Avoid nephrotoxins  -Strict I/Os  -Daily weight    #Hypernatremia, improving  Calculated free water deficit 2.8L.   - increase FWF to 200mL q4hr     Endocrine:  # History of DM  No meds PTA. Last A1c 5.9%.  - High dose sliding scale insulin     ID:  # COVID 19  Tested + 11/30. LDH typically ~200. Elevated inflammatory markers with , , fibrinogen 646, d-dimer 1.3, normal WBC.  - Dexamethasone and remdesivir as above  - abx as below     # Severe sepsis  Febrile to 38.1C, leukocytosis 10>18.4, ANC 16.1 and developed new pressor requirement on 12/5. Started on broad spectrum, Workup unrevealing thus far and leukocytosis improving. Unclear if true infection vs. Immune response 2/2 multiple thrombi  - Infectious disease consulted, appreciate recs  - Continue Zosyn   - discontinue doxycycline    Antimicrobials:  -Ceftriaxone (12/3-12/5)  -Doxycycline (12/3-12/8)  -Vancomycin (12/5-12/7)  -Zosyn (12/5-*)     Micro:  LVAD driveline exit site (12/3, 12/5): Culture in progress, NGTD  Blood cx (12/3): NGTD  Blood cx (12/5): NGTD  Sputum: unable to collect thus far     Hematology:    # LVAD outflow tract thrombus  # Supratherapeutic  anticoagulation on warfarin  # COVID ppx  Holding warfarin and heparin gtt given INR 5.8. TEG with increased clot strength otherwise largely normal. Given vit K 12/6, started on low intensity heparin, increased to high intensity heparin 12/7. CT head negative for acute pathology. Transitioned to bivalirudin 12/7.   - continue Bivalirudin  + DAPT    > platelets adequately inhibited   > PTT in therapeutic range  - PTT and LDH trend    Musculoskeletal:  # Gout  - continue PTA allopurinol  - HOLD PTA colchicine     Skin:  # No acute concerns     General Cares/Prophylaxis:    DVT Prophylaxis: Pneumatic Compression Devices  GI Prophylaxis: PPI  Restraints: None.  Family Communication: Updated by phone  Code Status: Full code.     Lines/tubes/drains:  - ETT, brown, NG, A line, CVC, PIV     Disposition:  Critically ill in ICU, intubated and sedated. Likely >3 days.      Patient seen and findings/plan discussed with medical ICU staff, Dr. Bridges.    Everardo Crystal MD  Internal Medicine, PGY-1  Pager: [phone] text page    ====================================  INTERVAL HISTORY:   NAEON. Remains on dobutamine, off other pressors. Continued improvement in LVAD flow.     OBJECTIVE:   1. VITAL SIGNS:   Temp:  [98.1  F (36.7  C)-99.5  F (37.5  C)] 98.1  F (36.7  C)  Pulse:  [52-85] 64  Resp:  [20-26] 20  MAP:  [61 mmHg-98 mmHg] 90 mmHg  Arterial Line BP: ()/(43-74) 131/69  FiO2 (%):  [50 %-60 %] 60 %  SpO2:  [89 %-98 %] 90 %  Ventilation Mode: CMV/AC  (Continuous Mandatory Ventilation/ Assist Control)  FiO2 (%): 60 %  Rate Set (breaths/minute): 20 breaths/min  Tidal Volume Set (mL): 440 mL  PEEP (cm H2O): 10 cmH2O  Oxygen Concentration (%): 60 %  Resp: 20    2. INTAKE/ OUTPUT:   I/O last 3 completed shifts:  In: 4199.98 [I.V.:1489.98; NG/GT:1510]  Out: 1805 [Urine:1805]    3. PHYSICAL EXAMINATION:  General: Sedated, male who appears stated age  HEENT: ETT and OG in place  Neuro: Sedated, opens eyes to voice, does not follow  commands  Pulm/Resp: Clear breath sounds bilaterally, does have bilateral mild rhonchi, crackles or wheeze, breathing non-labored, synchronous to ventilator  CV: bradycardic, murmur from LVAD  Abdomen: Soft, non-distended, non-tender.  : brown catheter in place.   Ext: Cool, without significant edema  Incisions/Skin: 2 second capillary refill.    4. LABS:   Arterial Blood Gases   Recent Labs   Lab 12/09/20  0412 12/08/20  0827 12/08/20  0256 12/07/20  1216   PH 7.43 7.41 7.47* 7.47*   PCO2 43 42 36 34*   PO2 62* 97 87 67*   HCO3 28 27 26 25     Complete Blood Count   Recent Labs   Lab 12/09/20  0356 12/08/20  0256 12/07/20  1216 12/07/20  1023   WBC 13.3* 12.1* 13.7* 14.8*   HGB 12.6* 12.7* 13.4 14.1    396 457* 507*     Basic Metabolic Panel  Recent Labs   Lab 12/09/20  0356 12/08/20  0256 12/07/20  1216 12/07/20  0348   * 147* 148* 145*   POTASSIUM 4.3 4.2 3.9 3.9   CHLORIDE 113* 117* 117* 114*   CO2 27 25 24 23   BUN 24 26 30 30   CR 0.84 0.90 1.01 1.00   * 132* 154* 189*     Liver Function Tests  Recent Labs   Lab 12/09/20  0356 12/08/20  0256 12/08/20  0219 12/07/20  1216 12/07/20  0348 12/06/20  0353 12/05/20  0404   AST 69* 133*  --  186* 201* 569* 367*   * 224*  --  271* 307* 394* 148*   ALKPHOS 112 106  --  108 109 112 131   BILITOTAL 0.8 0.9  --  1.4* 1.0 1.8* 0.9   ALBUMIN 2.4* 2.2*  --  2.0* 2.2* 2.3* 2.6*   INR  --   --  3.92*  --  2.15* 5.29* 5.76*     Coagulation Profile  Recent Labs   Lab 12/09/20  0628 12/09/20  0356 12/09/20  0037 12/08/20  2138 12/08/20  0219 12/08/20  0219 12/07/20  0348 12/07/20  0348 12/06/20  0353 12/06/20  0353 12/05/20  0404   INR  --   --   --   --   --  3.92*  --  2.15*  --  5.29* 5.76*   PTT 62* 57* 54* 52*   < > 128*   < >  --    < >  --   --     < > = values in this interval not displayed.       5. RADIOLOGY:   Recent Results (from the past 24 hour(s))   Echo Complete    Narrative    [id number]  [id number]  [id number]  [id number]^SHIRLENE^MOHAN            [facility],[place]  Echocardiography Laboratory  [address] [place] [address]     Name: JUAN SHEN  MRN: [id number]  : 1955  Study Date: 2020 12:34 PM  Age: 65 yrs  Gender: Male  [place] [facility] [place]  Reason For Study: LVAD failure  Ordering Physician: MOHAN GARBER  Performed By: Charlotte Roca RDCS     BSA: 2.1 m2  Height: 70 in  Weight: 203 lb  BP: 109/54 mmHg  _____________________________________________________________________________  __        Procedure  LVAD Echocardiogram with two-dimensional, color and spectral Doppler  performed.  _____________________________________________________________________________  __        Interpretation Summary  HeartMate 3 LVAD at 5400 RPM.  Moderately dilated LV with severely reduced global LV function, LVEF<20%.  LVIDd= 6.3 cm. RV is mildly dilated with moderately reduced function.  The ventricular septum is midline.  The aortic valve opens with every beat. There is mild AI.  LVAD inflow cannula is visualized in the LV apex. LVAD outflow graft is  visualized in the aorta.  Normal Doppler interrogation of the LVAD inflow cannula and outflow graft.     This study was compared with the study from 2020. The aortic valve open  every heart beat.  _____________________________________________________________________________  __        Left Ventricle  Moderate left ventricular dilation is present.     Right Ventricle  Mild right ventricular dilation is present. Global right ventricular function  is moderately reduced.     Atria  The atria cannot be assessed.        Mitral Valve  The mitral valve is normal.     Aortic Valve  Mild aortic insufficiency is present.     Vessels  Dilated superior vena cava. Unable to assess mean RA pressure given the  patient is on a ventilator.     Pericardium  No pericardial effusion is present.     Compared to Previous Study  This study was compared with the study from  12/6/2020 . The aortic valve open  every heart beat.     _____________________________________________________________________________  __                       Report approved by: MD Omar Reno 12/08/2020 01:59 PM                 _____________________________________________________________________________  __

## 2020-12-10 NOTE — PLAN OF CARE
Major Shift Events:  RASS -3. Follows commands though slow to respond. Restlessness/agitation managed w/ Fentanyl gtt, scheduled Ativan and Oxycodone, PRN Fentanyl boluses, and one PRN Versed bolus. Agitation brought on by bed bath/repositioning. HR up to 140s when agitated. No LVAD alarms overnight (see flowsheets for LVAD #s). Given 500mL LR bolus 1x after high UOP and persistent tachycardia (rates in 110s-120s)-resolved after receiving bolus. MAPs maintained >65 without intervention. CMV settings changed to FiO2 50%, Tv 440, RR 18, PEEP 12. Serial VBGs and ABGs drawn. Thick manjit/red-streaked secretions via ETT- sputum sample sent. 1 large loose stool this shift- rectal pouch replaced due to leak. Adequate UOP. Bivalirudin gtt increased to 0.187 per PTT results.     Plan: Wean vent settings as able. Pain/agitation management. q6h VBGs and FICKs.     For vital signs and complete assessments, please see documentation flowsheets.       Luz Daniel RN on 12/10/2020 at 6:45 AM

## 2020-12-10 NOTE — PROGRESS NOTES
[facility]     Cardiology Progress Note- Cards 2        Date of Admission:  12/3/2020     Assessment & Plan: SL   Danielito Chu is a 65 year old male admitted on 12/3/2020 with COVID pneumonia. He has a history of NICM (LVEF at dong of 15%) s/p ICD (4/2017), who underwent HM3 LVAD implant on 12/5/18 as DT, now BTT. He has a history of atrial arrythmia, s/p ICD, on amiodarone and metoprolol. He was admitted as transfer to MICU from [facility] for AHRF 2/2 COVID pneumonia. 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 Over the course of hospitalization, his respiratory status progressively deteriorated and he was ultimately transferred back to the MICU and emergently intubated. He then developed an elevated troponin (peak 24.782) without ischemic EKG changes, concerning for myocarditis>ACS/coronary embolism. He then developed progressive decline of LVAD flow and was found to have sub-total occlusion of the LVAD outflow graft on repeat CTA. Started on dobutamine and maintaining adequate hemodynamics. Repeat TTE shows aortic valve opening with each beat. Given CXR findings, increasing FiO2, and worsening paO2, there is concern for worsening of his COVID pneumonia/ARDS.    Would not recommend aggressive diuresis given RV dysfunction and preload dependent state. It is reasonable to target a net even fluid balance with CVP in the 8-10 range. Oxygenation has improved with increased PEEP. This may also be exacerbating RV function, however hemodynamics and end-organ function has remained stable.      Today's Recommendations:  - repeat CTA for interval assessment of outflow thrombus (shows interval increase in thrombus burder)  - repeat TTE (no significant changes, aortic valve continues to open with each beat, LVIDD 6.5 cm)  - consider T3 supplementation (strategy has been used in the pediatric population with  COVID), consult endocrine  - continue asa 162mg BID, dipyridamole 25mg TID  - continue bivalirudin, goal PTT 60-80  - TEG and platelet function assay daily  - trend SvO2 q6hr, CVP q6hr  - gentle diuresis as patient is very pre-load dependent given RV dysfunction, would target CVP 8-10 and then give maintenance diuretic to keep even fluid balance  - would consider low dose hydralazine if MAPs persistently >90     #NICM s/p LVAD 12/5/18  #Myocarditis  #RV Dysfunction  #Cardiogenic Shock  - continue to hold PTA spironolactone and losartan given tenuous hemodynamics  - consider low dose hydralazine if MAPs consistently >90  - gentle diuresis given preload dependent state, maintain CVP 8-10 and even fluid balance  - telemetry monitoring for ventricular dysrrythmias  - trend LFTs   - trend SvO2 q6h  - continue to trend CVP     #Covid Pneumonia/ARDS  #Acute hypoxic respiratory failure  Increasing FiO2 requirement, worsening opacities on CXR  - appreciate cares per primary medical team  - on dexamethasone  - remdesivir held 2/2 transaminitis     #COVID coagulopathy  #Supratherapuetic INR, reversed  #LVAD outflow thrombus  - continue asa 162mg BID, dipyridamole 25mg TID  - continue bivalirudin, goal PTT 60-80  - TEG and platelet function daily  - ASA now assay: therapuetic  - trend LDH     #Persistent atrial tachycardia  #Sinus Bradycardia  - continue to hold amiodarone  - continue to hold metoprolol     #LVAD driveline exudate  - cultures NGTD  - appreciate ID recommendations  - continue antibiotics for a total of 7 days of coverage      Entered: Chico Ventura MD 12/10/2020, 8:09 AM       The patient's care was discussed with the Attending Physician, Dr. Weems, Bedside Nurse and Primary team.    Chico Ventura MD   Cardiology Fellow  Pager: [phone]  [facility]: [phone]  [facility]   Please see sign in/sign out for up to date coverage  information  ______________________________________________________________________    Interval History   Increased FiO2 requirement. PEEP increased to 12 with improvement of oxygenation and subsequent downtitration of FiO2. Became hypotensive with low flow alarms overnight. This responded to fluids x2. Otherwise is awake and following commands off sedation this morning.     Data reviewed today: I reviewed all medications, new labs and imaging results over the last 24 hours.    Physical Exam   Vital Signs: Temp: 97.9  F (36.6  C) Temp src: Axillary  Pulse: 64   Resp: 22 SpO2: 99 % O2 Device: Mechanical Ventilator    Weight: 202 lbs 13.17 oz  General Appearance:  Sedated and intubated  Respiratory: intubated and mechanically ventilated, poor air movement over the bases, no crackles  Cardiovascular: LVAD hum  GI: soft, ND, + BS  Skin: warm,  no rash  Ext: warm to touch, trace edema, easily palpable pulses in the bilateral radials  Neuro: sedated, wiggles toes and squeezes hands to command      Data   Recent Labs   Lab 12/10/20  1524 12/10/20  0414 12/09/20  2311 12/09/20  1533 12/09/20  0356 12/08/20  0256 12/08/20  0219 12/07/20  0348 12/07/20  0348 12/06/20  1412 12/06/20  1412 12/06/20  0808 12/06/20  0353   WBC  --  14.8*  --   --  13.3* 12.1*  --    < > 11.9*   < >  --   --  13.0*   HGB  --  11.8*  --   --  12.6* 12.7*  --    < > 14.3   < >  --   --  14.8   MCV  --  99  --   --  101* 100  --    < > 98   < >  --   --  97   PLT  --  375  --   --  422 396  --    < > 474*   < >  --   --  470*   INR  --   --   --   --   --   --  3.92*  --  2.15*  --   --   --  5.29*   NA  --  134  --  141 145* 147*  --    < > 145*  --   --   --  143   POTASSIUM 4.2 3.9 4.2 3.7 4.3 4.2  --    < > 3.9  --   --   --  3.9   CHLORIDE  --  98  --  105 113* 117*  --    < > 114*  --   --   --  110*   CO2  --  30  --  30 27 25  --    < > 23  --   --   --  23   BUN  --  25  --  25 24 26  --    < > 30  --   --   --  29   CR  --  0.80  --  0.92  0.84 0.90  --    < > 1.00  --   --   --  1.28*   ANIONGAP  --  5  --  7 5 5  --    < > 7  --   --   --  10   ASHLEE  --  8.2*  --  8.6 8.3* 8.0*  --    < > 8.3*  --   --   --  8.6   GLC  --  186*  --  183* 172* 132*  --    < > 189*  --   --   --  162*   ALBUMIN  --  2.2*  --   --  2.4* 2.2*  --    < > 2.2*  --   --   --  2.3*   PROTTOTAL  --  5.6*  --   --  5.8* 5.6*  --    < > 6.0*  --   --   --  6.2*   BILITOTAL  --  1.0  --   --  0.8 0.9  --    < > 1.0  --   --   --  1.8*   ALKPHOS  --  106  --   --  112 106  --    < > 109  --   --   --  112   ALT  --  126*  --   --  173* 224*  --    < > 307*  --   --   --  394*   AST  --  54*  --   --  69* 133*  --    < > 201*  --   --   --  569*   LIPASE  --   --   --   --   --   --   --   --  133  --   --  116  --    TROPI  --   --   --   --   --   --   --   --   --   --  22.609* 24.782* 21.830*    < > = values in this interval not displayed.     Recent Results (from the past 24 hour(s))   Echo Limited    Narrative    [id number]  [id number]  [id number]  [id number]^SHIRLENE^MOHAN           [facility],[place]  Echocardiography Laboratory  [address] [place] [id number]     Name: JUAN SHEN  MRN: [id number]  : 1955  Study Date: 12/10/2020 12:30 PM  Age: 65 yrs  Gender: Male  [place] [facility] [place]  Reason For Study: Other Cardiac Device In Situ  Ordering Physician: MOHAN GARBER  Performed By: Charlotte Roca RDCS     BSA: 2.1 m2  Height: 70 in  Weight: 202 lb  BP: 111/62 mmHg  _____________________________________________________________________________  __        Procedure  Limited Portable Echo Adult. Technically difficult study.  _____________________________________________________________________________  __        Interpretation Summary  LVAD HM3 5500 rpm. Limited study.  Moderate left ventricular dilation is present. LVIDD is 6.5 cm.  Normal inflow/outflow graft doppler.  Global right ventricular function is moderately  reduced.  AoV opens with each beat. Mild to moderate aortic insufficiency is present.  No pericardial effusion is present.  No change from prior.  _____________________________________________________________________________  __        Left Ventricle  Moderate left ventricular dilation is present. Severely (EF 10-20%) reduced  left ventricular function is present.     Right Ventricle  Global right ventricular function is moderately reduced. A pacemaker lead is  noted in the right ventricle.     Aortic Valve  Mild to moderate aortic insufficiency is present.        Pericardium  No pericardial effusion is present.  _____________________________________________________________________________  __                          Report approved by: Baron Carvajal 12/10/2020 03:02 PM              _____________________________________________________________________________  __      CTA Chest with Contrast    Narrative    Procedure: CTA CHEST WITH CONTRAST  Indication: LVAD Outflow Graft Thrombus  Examination Date: 12/10/2020 3:27 PM  Ordering Physician: Dr. Sirena Weems     TECHNIQUE: The patient was positioned in the scanner gantry and an IV  was started using an 18 gauge IV in the right antecubital fossa.  Utilizing 120 cc [id number]. Multi-slice computed tomography was  performed with a Siemens Dual Source Flash scanner without incident.  The total radiation exposure was calculated to be 337DLP and 4.7mSv,  The angiogram was performed in the Flash mode with care dose at 120  kVp. Images were reconstructed and analyzed on a [employer]  Workstation. Scan protocol was optimized to minimize radiation  exposure.     IMPRESSIONS:    1. There is extensive thrombus burden along the entire length of the  outflow graft. There is near total occlusion at the outflow  graft-aorta anastomosis with a luminal area of 0.465 cm^2.  2. Compared to CTA chest from 12/7/2020 there has been an interval  increase in thrombus burden in the LVAD  outflow graft.  3. Please review Radiology report for incidental noncardiac findings  that will follow separately.    FINDINGS:    1. HeartMate III LVAD is seen in the expected position at the LV apex.  2. The LVAD inflow cannula is visualized in a capacious LV apex and is  directed towards the LV cavity; it is not significantly angled towards  the septum or LV free wall. No thrombus is seen in the proximal  portions of the inflow cannula. Metallic artifacts result in  suboptimal visualization of the inflow cannula at its distal insertion  into the LVAD pump.  3. The entire length of the LVAD outflow graft well-visualized. There  is extensive thrombus burden along the entire length of the outflow  graft. The outflow graft-aorta anastomosis is severely stenotic with  area 0.465 cm^2.  4. The lumen of the LVAD pump itself is poorly visualized due to  metallic artifacts.  5. The aortic root is normal in size. There are no calcifications of  the aortic arch. There is no dissection seen.  6. The aortic arch is left sided. There is a common origin of the  innominate and left common carotid artery. There is no coarctation  seen.  7. The main and proximal branch pulmonary arteries are normal in size.  8. The systemic venous connections are normal.  9. The cardiac chambers demonstrate normal atrioventricular and  ventriculoarterial concordance.  10. Coronary arteries originate from their respective cusps.  11. There are mild coronary artery calcifications.  12. There is no intracardiac thrombus.  13. There is no pericardial effusion.  14. Both atria are moderately enlarged.  15. Device lead is present in the right ventricle.    Findings discussed with MELISSA Harp on 12/10/2020.    I have personally reviewed the examination and initial interpretation  and I agree with the findings.    CASEY HERNÁNDEZ MD     Medications     bivalirudin ANTICOAGULANT (ANGIOMAX) 250mg/250mL in 0.9% Sodium Chloride 0.224  mg/kg/hr (12/10/20 1800)     dexmedetomidine Stopped (12/09/20 1220)     dextrose       DOBUTamine 2.5 mcg/kg/min (12/10/20 1800)     epoprostenol (VELETRI) 20 mcg/mL in sterile water inhalation solution 20 ng/kg/min (12/10/20 1616)     fentaNYL 100 mcg/hr (12/10/20 1800)     - MEDICATION INSTRUCTIONS -       midazolam       - MEDICATION INSTRUCTIONS -         allopurinol  200 mg Oral Daily     aspirin  162 mg Oral or Feeding Tube BID     dexamethasone  6 mg Oral Q24H     dipyridamole  25 mg Oral or Feeding Tube TID     influenza recomb quadrivalent PF  0.5 mL Intramuscular Prior to discharge     insulin aspart  1-10 Units Subcutaneous Q4H     lidocaine  1 patch Transdermal Q24h    And     lidocaine   Transdermal Q8H     LORazepam  4 mg Oral Q6H     [Held by provider] losartan  25 mg Oral Daily     [Held by provider] metoprolol succinate ER  37.5 mg Oral Daily     oxyCODONE  10 mg Oral Q4H     pantoprazole  40 mg Oral QAM AC     piperacillin-tazobactam  4.5 g Intravenous Q6H     polyethylene glycol  17 g Oral BID     protein modular  1 packet Per Feeding Tube BID     senna-docusate  2 tablet Oral At Bedtime     [Held by provider] spironolactone  37.5 mg Oral Daily     thiamine  100 mg Oral Daily

## 2020-12-10 NOTE — PROGRESS NOTES
[facility] CRITICAL CARE STAFF NOTE    Acute Critical Care Issues/Key Findings:     Danielito Chu remains critically ill due to acute hypoxic respiratory failure and ARDS secondary to COVID-19 pneumonia, with course complicated by acute on chronic systolic CHF, septic shock, oliguria, and LVAD outflow clot, and markedly elevated troponin, in the setting of a history of NICM s/p LVAD, ICD, DMII, CKDII, and GERD.      1. Acute hypoxic respiratory failure and ARDS secondary to COVID-19 pneumonia as well as fluid overload: intubated on 12/4. On Veletri (for his right ventricular failure). Not proned or paralyzed at this point. P/F is 168 this AM. This does fluctuate frequently. Continues on dexamethasone. Remdesivir was stopped due to transaminitis. Plateau pressure of 24 today. Currently on PEEP 12, FiO2 50. Try to wean FiO2 today.   2. NICM s/p LVAD, with subtotal occlussive LVAD outflow clot, acute on chronic systolic CHF, new right sided heart failure: Continue bivalirudin drip, dipyridamole 25 mg TID,  mg daily. Cardiology assitance managing LVAD appreciated. Holding home losartan, spironolactone, metoprolol. Cardiac surgery would like him on nasal canula and extubated prior to considering LVAD replacement, which may not be possible given LVAD dysfunction contributing to his respiratory failure. To have repeat CTA to assess clot today. Continue Veletri for RV failure (will ask cardiology if this can be weaned/stopped today). Would aim for net negative fluid status again, give bumex 1 mg IV assess fluid status, may need more-follow I/Os. Pulmonary edema is likely contributing to his hypoxia, which improves with diuresis.   3. Shock state (probable cardiogenic): on dobutamine, wean as able (on for CI <2), may be able to stop today, will ask cardiology. Mixed venous O2 sat was 70 this AM, stable. Hypotension is likely driven by cardiogenic shock, b  4. Possible septic component from  bacterial translocation from gut: Continue zosyn, may stop after 7 day empiric course (on 12/11). Appreciate ID assistance. Follow cultures, NGTD.   5. ICU sedation: Fentanyl drip, as well as enteral oxycodone 10 mg q4h and Ativan PO 4 mg q6. Lighten RASS goal to -1 to -2. Following commands this AM.   6. Mesenteric thrombus: Continue bivalirudin drip.   7. Elevated LFTs and lipase: Slowly improving. Monitor intermittently   8. Normocytic/macrocytic anemia: Hgb 11.8 today, from 12.6. Monitor. No active signs of blood loss.     The patient was seen and examined with the resident/fellow/NP team.  We have discussed the patient in detail and I agree with the findings, assessment, and plan as documented in resident/fellow Dr. Crystal's, note from today (December 10, 2020). Please see this note for additional details of physical exam, history, medications and labs.  I agree with assessment and plan as documented in said note, with main points listed above.   The plan was formulated in conjunction with pharmacy, ICU nurses, and respiratory therapist. I have personally reviewed today's vital signs, medications, laboratory and imaging results. I have reviewed all consults that have been ordered and are active for this patient.      Critical Care Time: 40 min.  I spent this time (excluding procedures) personally providing and directing critical care services at the bedside and on the critical care unit.      Date of Service (when I saw the patient): 12/10/20    Kyle Bridges MD    Department of Medicine, Division of Pulmonary, Allergy, Critical Care, and Sleep Medicine

## 2020-12-10 NOTE — PROGRESS NOTES
MEDICAL ICU PROGRESS NOTE  12/10/2020      Date of Service (when I saw the patient): 12/10/2020    ASSESSMENT: 64yo M with HM3 LVAD (12/2018) bridge to heart transplant, NICM, (LVEF at dong of 15%) s/p ICD (4/2017), DM2, CKD II, gout, GERD who was initially admitted to the ICU at [facility] on 12/3/2020 for evaluation of  fever, dyspnea and weakness and loss of appetite and transfer from [place] ED for ARHF 2/2 COVID pneumonia 12/2, transferred to floor 12/3, who acutely decompensated with hypoxic respiratory failure requiring intubation 12/4, acute on chronic biventricular heart failure on LVAD, and new LVAD thrombus.     CHANGES and MAJOR THINGS TODAY:   - RASS -1/-2  - repeat CTA per cardiology recs  - will consider decreasing/discontinuing veletri tomorrow  - decrease FWF to 60 mL q4hr  - IV bumex 1 mg once today - goal of -1L  - Endocrine consult for low T3, per cards  - repeat TTE  - planning to transfer to cardiology service in the AM 12/11    PLAN:     Neuro:  # Pain and sedation  Analgesia: Fentanyl gtt + PRN pushes  Sedation: Precedex gtt (attempt to wean today), versed PRNs  - Oxycodone 10mg q4h  - Ativan 4mg q6h  - RASS goal -1/-2     # Adjustment disorder  Evaluated by psychiatry on 12/4, patient expressed anxiety about upcoming procedures. Declined acute treatment.  - Pending health psychology consult, deferred until extubated and alert.     Pulmonary:  # Acute hypoxemic respiratory failure 2/2 Severe COVID-19 infection c/b severe ARDS  Transfered to [facility] on 12/3 oxymask 6L sating 96% and was transferred to the floor. On 12/4 acutely decompensated requiring mechanical ventilation, intubated. CXR showing bilateral worsening infiltrates on 12/7. Stopped Remdesivir (12/3-12/5) given transaminitis.   - Goal to keep PEEP to minimal amount to decrease RV stress   - ABGs have been significantly variable with little interventions although SpO2s have remained stable  - Dexamethasone (12/3-12/12*)  - Full dose  flolan (consider weaning 12/11)   - Abx as below      Ventilation Mode: CMV/AC  (Continuous Mandatory Ventilation/ Assist Control)  FiO2 (%): 40 %  Rate Set (breaths/minute): 18 breaths/min  Tidal Volume Set (mL): 440 mL  PEEP (cm H2O): 12 cmH2O  Oxygen Concentration (%): 40 %  Resp: 23    Cardiovascular:  # Myocarditis  # Elevated troponin (peaked at 24 on 12/6) secondary to myocarditis  Markedly elevated troponin to >20 on 12/5. EKG without changes suggestive for ischemia.  TTE with poor evaluation of R side, L side unchanged. Cardiology evaluated patient on 12/6, suspect myocarditis.  - Anticoagulation as below  - DAPLT: ASA 162mg + dipyrdamole 25 TID      # HFrEF 2/2 NICM s/p 3 LVAD as BTT on 12/5/2018, listed as status 7  # Biventricular heart failure  # s/p single chamber ICD (4/2017)  # Low flow alarms  TTE 12/4 revealed severe TR, increased RV size and severely reduced global RV function. LVEF <20%, normal LV size. Dry wt ~ 220-224. 225lb on admission.  LVAD with low flow alarms 12/7, 2.4~Lpm, no response with fluid resus, found to have worsening thrombus burden. He has been hemodynamically stable off pressors, has normal right sided filling pressures, increased pulse pressure, and low normal cardiac index.  TTE 12/8 with slightly improved RV function and aortic valve opening with each systolic beat.   - Cardiology following, greatly appreciate assistance  - Goal CVP 8-12  - dobutamine if CI < 2, SvO2 q6hr   - Improving LVAD flows (2.8-3 LPM today)  - Hold metoprolol given new biventricular heart failure  - Bumex 1mg IV this AM  - Goal of net -1000 today  - Holding spironolactone, losartan, bumex given acute HD decompensation  - Anticoagulation as below     # Outflow graft obstruction  Incidentally found LVAD outflow graft obstruction found on CT C/A/P; throughout entirety of the LVAD outflow cannula with ~50% stenosis. Unclear if patient developed acutely in hospital or prior to admission, INR was elevated  but TEG was largely normal pointing against hypocoagulable state. Low flow alarms 12/7, CTA revealed worsening stenosis to ~95% on 12/7.   - CT surgery consulted: expectant management for outflow graft obstruction, to be considered for potential transplant after COVID 19 *ongoing discussion between cardiology and CT surgery given complex situation, based on current discussions the plan is to await improved respiratory function (extubation) prior to proceeding with LVAD exchange*  - Anticoagluation as below  - DAPT as above     # Persistent atrial tachycardia  # H/o NSVT  S/P IV amiodarone (stopped 12/6). Intermittently elvira- and tachycardic. Stable blood pressure throughout.   - Hold PO metoprolol, as above  - Cardiology following, appreciate aid     # Lactic acidosis, improved  Likely in the setting of hypoperfusion from hypoxemia/hypotension. 5.5>2.3.     GI/Nutrition:  # Nutrition  - Tube feeds at goal  - Nutrition consulted, appreciate recs    # Bowel regimen  - PRN suppository  - Scheduled Miralax and Senna    # Mesenteric thrombus  CT 12/4 revealed small amount of nonocclusive thrombus within the proximal celiac artery.    # Elevated LFTs, improving  Lipase elevated to 1262 however, but then normalx2 following, will not follow further. AST//148 and worsening - could be 2/2 DILI (e.g. remdesivir) v. Component of hypoperfusion or congestive hepatopathy (suspect component as worsening after fluid resuscitation).   - trend LFTs  - stopped remdesivir 12/5AM  - s/p RUQ U/S (no cholecystitis)     Renal/Fluids/Electrolytes:  # Non-oliguric CRUZ, resolved  Baseline sCr 1.1-1.2>1.59 upon admission to ICU. Renal US without evidence of hydro and normal kidneys. CVP low at 5 suggesting hypovolemia,  FENa 0.2 suggesting pre-renal, and UA with hyaline casts further pointing to hypovolemia.   -Renal consulted, signed off  -Avoid nephrotoxins  -Strict I/Os  -Daily weight    #Hypernatremia, resolved  - FWF to 60mL  q4hr     Endocrine:  # History of DM  No meds PTA. Last A1c 5.9%.  - High dose sliding scale insulin    # Low free T3  TSH WNL, T3 was 0.8. disucssed with cardiology and in the pediatric population, low free T3 has been associated with heart failure.   - Endocrine consultation per cardiology      ID:  # COVID 19  Tested + 11/30. LDH typically ~200. Elevated inflammatory markers with , , fibrinogen 646, d-dimer 1.3, normal WBC.  - Dexamethasone and remdesivir as above  - abx as below     # Severe sepsis  Febrile to 38.1C, leukocytosis 10>18.4, ANC 16.1 and developed new pressor requirement on 12/5. Started on broad spectrum, Workup unrevealing thus far and leukocytosis improving. Unclear if true infection vs. Immune response 2/2 multiple thrombi and COVID  - Infectious disease consulted, appreciate recs  - Continue Zosyn x 7 day course EOT=12/11  - discontinue doxycycline    Antimicrobials:  -Ceftriaxone (12/3-12/5)  -Doxycycline (12/3-12/8)  -Vancomycin (12/5-12/7)  -Zosyn (12/5-*)     Micro:  LVAD driveline exit site (12/3, 12/5): Culture in progress, NGTD  Blood cx (12/3): NGTD  Blood cx (12/5): NGTD  Sputum: unable to collect thus far     Hematology:    # LVAD outflow tract thrombus  # Supratherapeutic anticoagulation on warfarin  # COVID ppx  Holding warfarin and heparin gtt given INR 5.8. TEG with increased clot strength otherwise largely normal. Given vit K 12/6, started on low intensity heparin, increased to high intensity heparin 12/7. CT head negative for acute pathology. Transitioned to bivalirudin 12/7.   - continue Bivalirudin  + DAPT    > platelets adequately inhibited   > PTT in therapeutic range (goal >60)  - PTT and LDH trend    Musculoskeletal:  # Gout  - continue PTA allopurinol  - HOLD PTA colchicine     Skin:  # No acute concerns     General Cares/Prophylaxis:    DVT Prophylaxis: Pneumatic Compression Devices  GI Prophylaxis: PPI  Restraints: None.  Family Communication: Updated by  phone  Code Status: Full code.     Lines/tubes/drains:  - ETT, brown, NG, A line, CVC, PIV     Disposition:  Critically ill in ICU, intubated and sedated. Likely >3 days.      Patient seen and findings/plan discussed with medical ICU staff, Dr. Bridges.    Everardo Crystal MD  Internal Medicine, PGY-1  Pager: [phone] text page    ====================================  INTERVAL HISTORY:   NAEON. Remains on dobutamine, off other pressors. Continued improvement in LVAD flow.     OBJECTIVE:   1. VITAL SIGNS:   Temp:  [97.9  F (36.6  C)-98.7  F (37.1  C)] 98.2  F (36.8  C)  Pulse:  [] 75  Resp:  [18-28] 23  MAP:  [66 mmHg-104 mmHg] 73 mmHg  Arterial Line BP: ()/(54-82) 102/57  FiO2 (%):  [40 %-80 %] 40 %  SpO2:  [93 %-100 %] 98 %  Ventilation Mode: CMV/AC  (Continuous Mandatory Ventilation/ Assist Control)  FiO2 (%): 40 %  Rate Set (breaths/minute): 18 breaths/min  Tidal Volume Set (mL): 440 mL  PEEP (cm H2O): 12 cmH2O  Oxygen Concentration (%): 40 %  Resp: 23    2. INTAKE/ OUTPUT:   I/O last 3 completed shifts:  In: 4887.49 [I.V.:1797.49; NG/GT:1440; IV Piggyback:500]  Out: 4560 [Urine:4560]    3. PHYSICAL EXAMINATION:  General: Sedated, male who appears stated age  HEENT: ETT and OG in place  Neuro: Sedated, opens eyes to voice, does not follow commands  Pulm/Resp: Clear breath sounds bilaterally, does have bilateral mild rhonchi, crackles or wheeze, breathing non-labored, synchronous to ventilator  CV: bradycardic, murmur from LVAD  Abdomen: Soft, non-distended, non-tender.  : brown catheter in place.   Ext: Cool, without significant edema  Incisions/Skin: 2 second capillary refill.    4. LABS:   Arterial Blood Gases   Recent Labs   Lab 12/10/20  0414 12/09/20  2311 12/09/20  1650 12/09/20  1206   PH 7.46* 7.46* 7.47* 7.52*   PCO2 47* 45 46* 39   PO2 84 133* 106* 57*   HCO3 33* 32* 33* 32*     Complete Blood Count   Recent Labs   Lab 12/10/20  0414 12/09/20  0356 12/08/20  0256 12/07/20  1216   WBC 14.8* 13.3*  12.1* 13.7*   HGB 11.8* 12.6* 12.7* 13.4    422 396 457*     Basic Metabolic Panel  Recent Labs   Lab 12/10/20  1524 12/10/20  0414 20  2311 20  1533 20  0356 20  0256   NA  --  134  --  141 145* 147*   POTASSIUM 4.2 3.9 4.2 3.7 4.3 4.2   CHLORIDE  --  98  --  105 113* 117*   CO2  --  30  --  30 27 25   BUN  --  25  --  25 24 26   CR  --  0.80  --  0.92 0.84 0.90   GLC  --  186*  --  183* 172* 132*     Liver Function Tests  Recent Labs   Lab 12/10/20  0414 20  0356 20  0256 20  02120  1216 20  0348 20  0353 12/05/20  0404   AST 54* 69* 133*  --  186* 201* 569* 367*   * 173* 224*  --  271* 307* 394* 148*   ALKPHOS 106 112 106  --  108 109 112 131   BILITOTAL 1.0 0.8 0.9  --  1.4* 1.0 1.8* 0.9   ALBUMIN 2.2* 2.4* 2.2*  --  2.0* 2.2* 2.3* 2.6*   INR  --   --   --  3.92*  --  2.15* 5.29* 5.76*     Coagulation Profile  Recent Labs   Lab 12/10/20  1524 12/10/20  0940 12/10/20  0652 12/10/20  0414 20  0219 20  0219 20  0348 20  0348 20  0353 20  0353 20  0404   INR  --   --   --   --   --  3.92*  --  2.15*  --  5.29* 5.76*   PTT 68* 63* 59* 57*   < > 128*   < >  --    < >  --   --     < > = values in this interval not displayed.       5. RADIOLOGY:   Recent Results (from the past 24 hour(s))   Echo Limited    Narrative    [id number]  [id number]  [id number]  [id number]^SHIRLENE^MOHAN           [facility],[place]  Echocardiography Laboratory  [address] [place] [id number]     Name: JUAN SHEN  MRN: [id number]  : 1955  Study Date: 12/10/2020 12:30 PM  Age: 65 yrs  Gender: Male  [place] [facility] [place] For Study: Other Cardiac Device In Situ  Ordering Physician: MOHAN GARBER  Performed By: Charlotte Roca RDCS     BSA: 2.1 m2  Height: 70 in  Weight: 202 lb  BP: 111/62 mmHg  _____________________________________________________________________________  __         Procedure  Limited Portable Echo Adult. Technically difficult study.  _____________________________________________________________________________  __        Interpretation Summary  LVAD HM3 5500 rpm. Limited study.  Moderate left ventricular dilation is present. LVIDD is 6.5 cm.  Normal inflow/outflow graft doppler.  Global right ventricular function is moderately reduced.  AoV opens with each beat. Mild to moderate aortic insufficiency is present.  No pericardial effusion is present.  No change from prior.  _____________________________________________________________________________  __        Left Ventricle  Moderate left ventricular dilation is present. Severely (EF 10-20%) reduced  left ventricular function is present.     Right Ventricle  Global right ventricular function is moderately reduced. A pacemaker lead is  noted in the right ventricle.     Aortic Valve  Mild to moderate aortic insufficiency is present.        Pericardium  No pericardial effusion is present.  _____________________________________________________________________________  __                          Report approved by: Baron Carvajal 12/10/2020 03:02 PM              _____________________________________________________________________________  __      CTA Chest with Contrast    Narrative    Procedure: CTA CHEST WITH CONTRAST  Indication: LVAD Outflow Graft Thrombus  Examination Date: 12/10/2020 3:27 PM  Ordering Physician: Dr. Sirena Weems     TECHNIQUE: The patient was positioned in the scanner gantry and an IV  was started using an 18 gauge IV in the right antecubital fossa.  Utilizing 120 cc [id number]. Multi-slice computed tomography was  performed with a Siemens Dual Source Flash scanner without incident.  The total radiation exposure was calculated to be 337DLP and 4.7mSv,  The angiogram was performed in the Flash mode with care dose at 120  kVp. Images were reconstructed and analyzed on a RealD  Workstation. Scan  protocol was optimized to minimize radiation  exposure.     IMPRESSIONS:    1. There is extensive thrombus burden along the entire length of the  outflow graft. There is near total occlusion at the outflow  graft-aorta anastomosis with a luminal area of 0.465 cm^2.  2. Compared to CTA chest from 12/7/2020 there has been an interval  increase in thrombus burden in the LVAD outflow graft.  3. Please review Radiology report for incidental noncardiac findings  that will follow separately.    FINDINGS:    1. HeartMate III LVAD is seen in the expected position at the LV apex.  2. The LVAD inflow cannula is visualized in a capacious LV apex and is  directed towards the LV cavity; it is not significantly angled towards  the septum or LV free wall. No thrombus is seen in the proximal  portions of the inflow cannula. Metallic artifacts result in  suboptimal visualization of the inflow cannula at its distal insertion  into the LVAD pump.  3. The entire length of the LVAD outflow graft well-visualized. There  is extensive thrombus burden along the entire length of the outflow  graft. The outflow graft-aorta anastomosis is severely stenotic with  area 0.465 cm^2.  4. The lumen of the LVAD pump itself is poorly visualized due to  metallic artifacts.  5. The aortic root is normal in size. There are no calcifications of  the aortic arch. There is no dissection seen.  6. The aortic arch is left sided. There is a common origin of the  innominate and left common carotid artery. There is no coarctation  seen.  7. The main and proximal branch pulmonary arteries are normal in size.  8. The systemic venous connections are normal.  9. The cardiac chambers demonstrate normal atrioventricular and  ventriculoarterial concordance.  10. Coronary arteries originate from their respective cusps.  11. There are mild coronary artery calcifications.  12. There is no intracardiac thrombus.  13. There is no pericardial effusion.  14. Both atria are  moderately enlarged.  15. Device lead is present in the right ventricle.    Findings discussed with MELISSA Harp on 12/10/2020.    I have personally reviewed the examination and initial interpretation  and I agree with the findings.    CASEY HERNÁNDEZ MD

## 2020-12-11 NOTE — PLAN OF CARE
Problem: Patient Care Overview  Goal: Plan of Care/Patient Progress Review  OT/CR 6C  Discharge Planner OT   Patient plan for discharge: Home  Current status: Eval complete, treatment, indicated. SBA supine<>EOB. SBA donning bilateral socks. SBA sit<>stand x2 reps throughout session. Pt ambulated ~700ft with SBA/CGA. After ambulation, pt demonstrates soft BP, however was asymptomatic. BP before ambulation 102/90, after ambulation 63/41, once laying supine in bed 72/62. RN notified and in room with pt at end of session.  Barriers to return to prior living situation: fatigue, deconditioning, acute medical needs  Recommendations for discharge: Home with OP CR  Rationale for recommendations: Pt is primarily independent with ADL completion, however would benefit from continued skilled therapy to increase exercise endurance and education on heart failure management       Entered by: Dotty Stovallteralisa 06/12/2018 8:52 AM            No

## 2020-12-11 NOTE — PLAN OF CARE
OT/4C: Cancel. Patient with multiple providers upon attempts to check-in for therapy. Will reschedule.

## 2020-12-11 NOTE — PROGRESS NOTES
CLINICAL NUTRITION SERVICES - REASSESSMENT NOTE     Nutrition Prescription    RECOMMENDATIONS FOR MDs/PROVIDERS TO ORDER:  Continue to monitor BG, fluids and bowel regimen     Malnutrition Status:    Unable to determine due to RD unable to complete NFPA at this time    Recommendations already ordered by Registered Dietitian (RD):  RD to adjust TF regimen as follows: Nutren 1.5 @ 65 ml/hr to provide 2340 kcals, 106 g PRO, 1186 mL H2O, 275 g CHO and no fiber daily.  + 2 pkts prosource: 2420 ( 31 kcals/kg), 128 g pro  ( 1.6 g/kg pro)    Future/Additional Recommendations:  Monitor endocrine consult and results   Weight trends and need for ppFT upon/before extubation      EVALUATION OF THE PROGRESS TOWARD GOALS   Diet: NPO  Nutrition Support: Nutren 1.5 @ 50 mL/hr to provide;  + 2 pkts [id number] kcals ( 22 kcal/kg/day), 82 g PRO (1.0 g/kg/day), 912 mL H2O, 211 g CHO and no fiber daily.      Intake: Average TF + prosource intakes:  892 ml, 1418 kcals ( 17 kcals/kg), 83 g pro ( 1 g/kg pro)     NEW FINDINGS   Weight: 10% wt loss in the past 1 week or so since admission.  Labs: hypernatremia has resolved, T3: 43 (L)  Meds: high dose sliding scale, Dexamethasone, remdesivir, Potassium and magnesium replaced, miralax, KlorCon, thiamine   GI: Senna not given, last BM+ noted 12/11  Resp: Intubated/ sedated, team plans to continue to wean vent settings as tolerated.    Dosing Weight: 79 kg (adjusted wt from admit wt 91.4 kg and IBW 75.5 kg).     ASSESSED NUTRITION NEEDS   Estimated Energy Needs: 8550-2121  kcals/day (25-30 kcals/kg)   Justification: Maintenance   Estimated Protein Needs: 119-158  g/day (1.5-2 g/kg)   Justification: Hypercatabolism with acute illness   Estimated Fluid Needs: Justification: Per provider pending fluid status     MALNUTRITION  % Intake: Decreased intake does not meet criteria  % Weight Loss: > 2% in 1 week (severe)  Subcutaneous Fat Loss: Unable to assess - Covid +   Muscle Loss: Unable to  assess, Covid +  Fluid Accumulation/Edema: Does not meet criteria  Malnutrition Diagnosis: Unable to determine due to RD unable to complete NFPA at this time    Previous Goals   Total avg nutritional intake to meet a minimum of 20 kcal/kg and 1.2 gm PRO/kg daily (per dosing wt 82 kg adjusted wt ).  Evaluation: Not met    Previous Nutrition Diagnosis  Inadequate oral intake related to acute hypoxic respiratory failure secondary to Covid-19 infection, inhibiting ability to take PO as evidenced by NPO status, plan to start TF support   Evaluation: No change    CURRENT NUTRITION DIAGNOSIS  Inadequate protein-energy intake related to slow advancement of TF as evidenced by pt meeting <85% of estimated needs.     INTERVENTIONS  Implementation  Enteral Nutrition - Modify rate    Goals  Total avg nutritional intake to meet a minimum of 25 kcal/kg and 1.5 g PRO/kg daily (per dosing wt 79 kg).    Monitoring/Evaluation  Progress toward goals will be monitored and evaluated per protocol.      Marisela Cano, RD, MS, LD  SICU: [phone]

## 2020-12-11 NOTE — PROGRESS NOTES
Summary:  65 year old with NICM s/p LVAD admitted with COVID ARDS c/b LVAD outflow clot and septic versus cardiogenic shock. Admitted 12/3. Intubated 12/4.     S: No events overnight.     O: Afebrile. MAPs mostly 60-70s. One MAP of 98 overnight. Sating 90s.   CVP 4-6. Mixed venous O2 48-63  ABG this AM 7.55/36/89/31  - 500 ml yesterday. Net +4.8 since admission    PHYSICAL EXAMINATION:  General: Sedated, male who appears stated age  HEENT: ETT and OG in place  Neuro: Sedated, does not open eyes to voice  Pulm/Resp: Clear breath sounds bilaterally, breathing non-labored, synchronous to ventilator  CV: RRR murmur from LVAD A-line showing pulsatile flow  Abdomen: Soft, non-distended, non-tender.  : brown catheter in place.   Ext: Cool, without significant edema, + radial pulses present  Incisions/Skin: 2 second capillary refill.     Labs and imaging reviewed with staff.     Assessment/Plan:    # Hypoxic respiratory failure 2/2 covid ARDS, improving  # LVAD with outflow track clot, pulsatile flow on ABG, EF <20  # RV dysfunction and cardiogenic shock  Improvement in consolidations previously seen on CT. Increased clot burned within the LVAD. Hypoxia is improving.   - I've decreased peep from 12 to 8 throughout the day today, pt tolerated well  - cards is placing a swan tonight so will hold off on stopping flolan since we'll have more data available soon. Oxygenation/pulmonary wise, stopping flolan is appropriate but will defer to cards regarding RV dysfunction.       Attending note:  The patient was seen examined and discussed with Dr. Joseph. All data reviewed.  Agree with the assessment and plan as outlined in the above note.  Still on high ventilator support; will begin to decrease PEEP today and if tolerates will wean down Flolan.  May be able to tolerate vent weans this weekend.    Tracy Rojas MD  [phone]

## 2020-12-11 NOTE — PLAN OF CARE
Major Shift Events:  No neuro changes. Discomfort managed w/ Fentanyl gtt and scheduled meds. HR 60s-90s, MAPs maintained >65 without intervention. No LVAD alarms overnight (see flowsheets for LVAD #s). Tolerating current vent settings well: FiO2 40%, RR 18, PEEP 12, Tv 440. Scant secretions via ETT. Small amounts of loose stool via rectal pouch overnight. Valentine w/ adequate UOP. Potassium and magnesium due to be replaced this AM. PTT in therapeutic range, Bivalrudin gtt remains @ 0.224.     Plan: Cards 2 to take over as primary this AM. Continue to wean vent settings as tolerated. Titrate fentanyl gtt as able.     For vital signs and complete assessments, please see documentation flowsheets.     Luz Daniel RN on 12/11/2020 at 7:00 AM

## 2020-12-11 NOTE — PROGRESS NOTES
[facility]     Cardiology Progress Note- Cards 2        Date of Admission:  12/3/2020     Assessment & Plan: S   Danielito Chu is a 65 year old male admitted on 12/3/2020 with COVID pneumonia. He has a history of NICM (LVEF at dong of 15%) s/p ICD (4/2017), who underwent HM3 LVAD implant on 12/5/18 as DT, now BTT. He has a history of atrial arrythmia, s/p ICD, on amiodarone and metoprolol. He was admitted as transfer to MICU from [facility] for AHRF 2/2 COVID pneumonia. 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 Over the course of hospitalization, his respiratory status progressively deteriorated and he was ultimately transferred back to the MICU and emergently intubated. He then developed an elevated troponin (peak 24.782) without ischemic EKG changes, concerning for myocarditis>ACS/coronary embolism. He then developed progressive decline of LVAD flow and was found to have sub-total occlusion of the LVAD outflow graft on repeat CTA. Started on dobutamine and maintaining adequate hemodynamics. Repeat TTE shows aortic valve opening with each beat. Given CXR findings, increasing FiO2, and worsening paO2, there is concern for worsening of his COVID pneumonia/ARDS.  Bedside RHC on 12/11/20 with      Today's Plan:  - bedside RHC CVP 8, PA 24/16, PCWP 16  - wean epoprostenol as able  - start nitroprusside for afterload reduction    Neuro:  # Pain and sedation  Analgesia: Fentanyl gtt + PRN pushes  - Oxycodone 10mg q4h  - Ativan 4mg q6h  - RASS goal -2/-3    Pulmonary:  # Acute hypoxemic respiratory failure 2/2 Severe COVID-19 infection c/b severe ARDS  Transfered to [facility] on 12/3 oxymask 6L sating 96% and was transferred to the floor. On 12/4 acutely decompensated requiring mechanical ventilation, intubated. CXR showing bilateral worsening infiltrates on 12/7. Stopped Remdesivir (12/3-12/5) given  transaminitis.   - Goal to keep PEEP to minimal amount to decrease RV stress   - Dexamethasone (12/3-12/12*)  - Wean flolan as able  - Abx as below   - appreciate Pulmonary consultation  Ventilation Mode: CMV/AC  (Continuous Mandatory Ventilation/ Assist Control)  FiO2 (%): 40 %  Rate Set (breaths/minute): 18 breaths/min  Tidal Volume Set (mL): 440 mL  PEEP (cm H2O): 8 cmH2O  Oxygen Concentration (%): 40 %  Resp: 18    Cardiovascular:   #NICM s/p LVAD 12/5/18  #Myocarditis  #RV Dysfunction  #Cardiogenic Shock  - continue dobutamine 2.5  - start nitroprusside for afterload reduction  - maintain CVP 8-10 and even fluid balance  - wean epoprostenol as able  - telemetry monitoring for ventricular dysrrythmias  - trend LFTs   - trend hemodynamics q6h    #Atrial tachycardia  #Sinus Bradycardia  - continue to hold amiodarone  - continue to hold metoprolol  - telemetry monitoring    Hematology:   #COVID coagulopathy  #Supratherapuetic INR, reversed  #LVAD outflow thrombus  - continue asa 162mg BID, dipyridamole 25mg TID  - continue bivalirudin, goal PTT 60-80  - TEG and platelet function daily  - ASA now assay: therapuetic  - trend LDH     ID:  #LVAD driveline exudate  - cultures NGTD  - appreciate ID recommendations  - continue antibiotics for a total of 7 days of coverage    # COVID 19  Tested + 11/30. LDH typically ~200. Elevated inflammatory markers with , , fibrinogen 646, d-dimer 1.3, normal WBC.  - Dexamethasone and remdesivir as above  - abx as above    Endocrine:  # History of DM  No meds PTA. Last A1c 5.9%.  - High dose sliding scale insulin    GI/Nutrition:  # Nutrition  - Tube feeds at goal  - Nutrition consulted, appreciate recs     # Bowel regimen  - PRN suppository  - Scheduled Miralax and Senna     # Mesenteric thrombus  CT 12/4 revealed small amount of nonocclusive thrombus within the proximal.      # Elevated LFTs, improving  Lipase elevated to 1262 however, but then normalx2 following, will  not follow further. AST//148 and worsening - could be 2/2 DILI (e.g. remdesivir) v. Component of hypoperfusion or congestive hepatopathy (suspect component as worsening after fluid resuscitation).   - trend LFTs  - stopped remdesivir 12/5AM  - s/p RUQ U/S (no cholecystitis)    Diet:   Tube feeds  DVT Prophylaxis: On Bivalirudin gtt  Valentine Catheter: in place, indication: Strict 1-2 Hour I&O  Code Status:   FULL CODE      Disposition Plan   Pending further evaluation and treatment.    Entered: Chico Ventura MD 12/11/2020, 7:26 AM       The patient's care was discussed with the Attending Physician, Dr. Araujo, Bedside Nurse and Patient's Family.    Chico Ventura MD   Cardiology Fellow  Pager: [phone]  [facility] [place]: [phone]  [facility]   Please see sign in/sign out for up to date coverage information      I have seen and examined the patient on December 11, 2020. I have discussed the care of this patient with the heart failure team and I agree with the assessment and plan as outlined in the note below. I have personally reviewed vital signs, hemodynamics, medications, laboratory values, and all diagnostic testing.      The patient is critically ill and requires ICU status for: Respiratory failure due to COVID-19, LVAD outflow thrombosis, cardiogenic shock     Active problems and management:   1. COVID-19 ARDS  2. Right heart failure  3. LVAD with outflow graft thrombosis  4. Troponin elevation concerning for COVID myocarditis   5. Shock, cardiogenic      65 year-old man with HM3 LVAD as BTT with COVID respiratory distress who is intubated treated with dexamethasone and remdesivir.      He had LVAD flow flow alarms with persistently low flow. His CTA demonstrated worsened thrombus occlusion of the outflow graft. Bivalrudin, ASA 162mg BID, and persantine continued to maximize his antithrombotic response.     PA catheter placed today which revealed normal filling pressures and  reduced but acceptable cardiac index with elevated SVR.  Have initiated nipride to try and improve SVR and cardiac index.  Pt will need to be extubated, have significant improvement from COVID and improvement in functional status prior to consideration for LVAD exchange.  Pt condition remains tenuous.     I have personally managed the following: temporary or durable mechanical circulatory support,anticoagulation and inotrope and parenteral vasodilator regimen.     Total critical care time spent by me at the bedside and/or in the ICU, excluding procedures was 40 minutes.      This includes review of all data, coordination of care with consulting services, adjustment and personally managing: mechanical circulatory support: LVAD, anticoagulation, and serial assessment of the patient and hemodynamics.     Ashley Araujo MD  Section Head - Advanced Heart Failure, Transplantation and Mechanical Circulatory Support  Director - Adult [facility]ovascular [facility]  Associate Professor of Medicine, [facility]    ______________________________________________________________________    Interval History   No major events overnight. LVAD parameters stable. Remains sedated and intubated. Vent parameters improving. RHC at bedside today.    Data reviewed today: I reviewed all medications, new labs and imaging results over the last 24 hours.     Physical Exam   Vital Signs: Temp: 97.7  F (36.5  C) Temp src: Axillary  Pulse: 68   Resp: 18 SpO2: 96 % O2 Device: Mechanical Ventilator    Weight: 201 lbs 8.01 oz  General Appearance:  Sedated and intubated  Respiratory: intubated and mechanically ventilated, poor air movement over the bases, no crackles  Cardiovascular: LVAD hum  GI: soft, ND, + BS  Skin: warm,  no rash  Ext: warm to touch, trace edema, easily palpable pulses in the bilateral radials  Neuro: sedated, opens eyes to questioning    Data   Recent Labs   Lab 12/11/20  9448 12/10/20  6884  12/10/20  0414 12/09/20  1533 12/09/20  1533 12/09/20  0356 12/08/20  0219 12/08/20  0219 12/07/20  0348 12/07/20  0348 12/06/20  1412 12/06/20  1412 12/06/20  0808 12/06/20  0353   WBC 13.5*  --  14.8*  --   --  13.3*   < >  --    < > 11.9*   < >  --   --  13.0*   HGB 12.0*  --  11.8*  --   --  12.6*   < >  --    < > 14.3   < >  --   --  14.8   MCV 97  --  99  --   --  101*   < >  --    < > 98   < >  --   --  97     --  375  --   --  422   < >  --    < > 474*   < >  --   --  470*   INR  --   --   --   --   --   --   --  3.92*  --  2.15*  --   --   --  5.29*     --  134  --  141 145*   < >  --    < > 145*  --   --   --  143   POTASSIUM 3.8 4.2 3.9   < > 3.7 4.3   < >  --    < > 3.9  --   --   --  3.9   CHLORIDE 96  --  98  --  105 113*   < >  --    < > 114*  --   --   --  110*   CO2 28  --  30  --  30 27   < >  --    < > 23  --   --   --  23   BUN 26  --  25  --  25 24   < >  --    < > 30  --   --   --  29   CR 0.77  --  0.80  --  0.92 0.84   < >  --    < > 1.00  --   --   --  1.28*   ANIONGAP 9  --  5  --  7 5   < >  --    < > 7  --   --   --  10   ASHLEE 8.6  --  8.2*  --  8.6 8.3*   < >  --    < > 8.3*  --   --   --  8.6   *  --  186*  --  183* 172*   < >  --    < > 189*  --   --   --  162*   ALBUMIN 2.4*  --  2.2*  --   --  2.4*   < >  --    < > 2.2*  --   --   --  2.3*   PROTTOTAL 6.2*  --  5.6*  --   --  5.8*   < >  --    < > 6.0*  --   --   --  6.2*   BILITOTAL 1.8*  --  1.0  --   --  0.8   < >  --    < > 1.0  --   --   --  1.8*   ALKPHOS 109  --  106  --   --  112   < >  --    < > 109  --   --   --  112   *  --  126*  --   --  173*   < >  --    < > 307*  --   --   --  394*   AST 46*  --  54*  --   --  69*   < >  --    < > 201*  --   --   --  569*   LIPASE  --   --   --   --   --   --   --   --   --  133  --   --  116  --    TROPI  --   --   --   --   --   --   --   --   --   --   --  22.609* 24.782* 21.830*    < > = values in this interval not displayed.     Recent Results (from the  past 24 hour(s))   XR Chest Port 1 View    Narrative    EXAM: XR CHEST 1 VW 12/11/2020      HISTORY: covid pneumonia.    COMPARISON: CT 12/10/2020 and radiograph 12/9/2020.     TECHNIQUE: Frontal view of the chest.    FINDINGS: Endotracheal tube tip projects 5.5 cm above the salomón. Left  internal jugular central venous catheter tip projects over the mid  SVC. Stable projection of single lead implantable cardiac  defibrillator in the left chest wall, LVAD with partially visualized  drive line, and enteric tube with tip beyond view inferiorly.  Discontinuous median sternotomy wire, unchanged.    Mixed right greater than left interstitial and airspace opacities not  significantly changed. Small amount of right pleural fluid is  unchanged. Unchanged asymmetric elevation of the right hemidiaphragm.  No acute osseous change.      Impression    IMPRESSION: Unchanged diffuse mixed airspace opacities. Endotracheal  tube, central line, and cardiac devices are unchanged.    I have personally reviewed the examination and initial interpretation  and I agree with the findings.    COLLEEN SANDOVAL MD   XR Chest Port 1 View    Narrative    Exam: XR CHEST PORT 1 VW, 12/11/2020 6:55 PM    Indication: Verify PA catheter    Comparison: Same-day    Findings:   Right internal jugular [place] catheter tip projects over the main  pulmonary artery. Left internal jugular central venous catheter tip at  the mid to low SVC. Endotracheal tube tip at the upper thoracic  trachea. Median sternotomy wires, one of which is fractured. Stable  LVAD and left chest wall automatic implantable cardiac defibrillator.  Stable prominent cardiac silhouette. The pulmonary vasculature is  indistinct. Diffuse bilateral interstitial prominence, not significant  changed. Possible trace bilateral pleural effusions. No pneumothorax.      Impression    Impression: [place] catheter tip at the main pulmonary artery.    ADAMS WILSON, DO     Medications      bivalirudin ANTICOAGULANT (ANGIOMAX) 250mg/250mL in 0.9% Sodium Chloride Stopped (12/11/20 1905)     dexmedetomidine Stopped (12/09/20 1220)     dextrose       DOBUTamine 2.5 mcg/kg/min (12/11/20 2000)     epoprostenol (VELETRI) 20 mcg/mL in sterile water inhalation solution 20 ng/kg/min (12/11/20 1931)     fentaNYL 75 mcg/hr (12/11/20 2000)     - MEDICATION INSTRUCTIONS -       midazolam       nitroPRUsside (NIPRIDE) IV infusion ADULT/PEDS GREATER than or EQUAL to 45 kg (std conc)       - MEDICATION INSTRUCTIONS -         allopurinol  200 mg Oral Daily     aspirin  162 mg Oral or Feeding Tube BID     dexamethasone  6 mg Oral Q24H     dipyridamole  25 mg Oral or Feeding Tube TID     influenza recomb quadrivalent PF  0.5 mL Intramuscular Prior to discharge     insulin aspart  1-10 Units Subcutaneous Q4H     lidocaine  1 patch Transdermal Q24h    And     lidocaine   Transdermal Q8H     LORazepam  4 mg Oral Q6H     [Held by provider] losartan  25 mg Oral Daily     [Held by provider] metoprolol succinate ER  37.5 mg Oral Daily     oxyCODONE  10 mg Oral Q4H     pantoprazole  40 mg Oral QAM AC     piperacillin-tazobactam  4.5 g Intravenous Q6H     polyethylene glycol  17 g Oral BID     protein modular  1 packet Per Feeding Tube BID     senna-docusate  2 tablet Oral At Bedtime     [Held by provider] spironolactone  37.5 mg Oral Daily     thiamine  100 mg Oral Daily

## 2020-12-11 NOTE — PLAN OF CARE
Major Shift Events:  PEEP decreased from 12 to 8. CVP= 9, SV02= 69. No alarms from LVAD.   Plan: [place] placement. FICKs q6 hours.   For vital signs and complete assessments, please see documentation flowsheets.

## 2020-12-12 NOTE — PROGRESS NOTES
Summary:  65 year old with NICM s/p LVAD admitted with COVID ARDS c/b LVAD outflow clot and septic versus cardiogenic shock. Admitted 12/3. Intubated 12/4.     S: [place] placed overnight. Flolan decreased to half strength. Started nipride to keep MAPs <80    O: Afebrile. MAPs mostly 60-70s. Sating 90s.   CVP 5-11.   ABG this AM 7.55/32/67/32  Net even yesterday. Net +5L since admission    PHYSICAL EXAMINATION:  General: Sedated, grimaces to voice  HEENT: ETT and OG in place  Pulm/Resp: Clear breath sounds bilaterally, breathing non-labored, synchronous to ventilator  CV: RRR murmur from LVAD A-line showing pulsatile flow  Abdomen: Soft, non-distended, non-tender.  : brown catheter in place.   Ext: Cool, without significant edema, + radial pulses present  Incisions/Skin: 2 second capillary refill.     Labs and imaging reviewed with staff.     Assessment/Plan:    # Hypoxic respiratory failure 2/2 covid ARDS, improved  # LVAD with outflow track clot, pulsatile flow on ABG, EF <20  # RV dysfunction and cardiogenic shock  Improvement in consolidations previously seen on CT. Increased clot burned within the LVAD. Hypoxia is improving.     Oxygenation:  - I've decreased peep from 12 to 8 throughout the day today, pt tolerated well  - Flolan at half strength.    Get ABG after 4-5 hours (discussed with nursing)   If O2 is stable, OK to stop veletri  - If tolerating off flolan, tomorrow do a pressure support trial  - Recommend T-piece (0/0) PST for 20-30 min prior to extubation in this patient with severely reduced EF to see if he tolerates the increased afterload    Ventilation:  Metabolic alkalosis  - I turned the rate down to 15 today but pt overbreathing so no change in CO2  - I decreased tidal volume to 400 (this is below 6ml/kg and might not be comfortable)      Assessment and plan:  Patient seen, examined and discussed with Dr. Joseph. All data reviewed. Agree with the assessment and plan as outlined in the above  note.  Decreased PEEP and decreasing Flolan.  If able to discontinue Flolan will assess for vent wean trials in am. Will need to wean on low level positive pressure in setting of severe heart failure.    Tracy Rojas MD  [phone]

## 2020-12-12 NOTE — PLAN OF CARE
OT-4C: Cancel. Pt working toward possible extubation. Will reschedule and initiate when most appropriate.

## 2020-12-12 NOTE — PROGRESS NOTES
Inpatient Procedure Note    Procedure: Ultrasound guided central access, PA catheter placement, central line placement  Indication: Hemodynamic monitoring, central access  Diagnosis: Cardiogenic shock, Right Ventricular Failure, ARDS    After informing the benefits and risks, an informed consent was obtained from the patient's medical decision maker, Fani Chu. A time out and site verification were done prior to initiation of the procedure. The neck was prepped and draped in the usual sterile fashion and infiltrated with lidocaine under ultrasound guidance. A 9.0 Fr sheath was placed in the right internal jugular vein using modified Seldinger technique ultrasound guidance. A balloon-tipped [place] catheter was advanced into the right atrium, right ventricle, pulmonary artery and pulmonary capillary wedge position with guidance of pressure waveform. The balloon was deflated and the [place]-Soy catheter was left in the pulmonary artery. [place] locked at 52 cm. At time of procedure completion, all the ports aspirated and flushed properly. The patient tolerated the procedure well without any immediate complications.     Post-procedure x-ray was ordered and in process.    Complications: None   Blood loss: Minimal blood loss    Ashley Fisher MD was present for key portions of the procedure.  Ari Kelly was present for the whole procedure.  Chico Sutton MD was present for the whole procedure    Chico Ventura MD  Cardiology Fellow  Pager: [phone]

## 2020-12-12 NOTE — PLAN OF CARE
Major Shift Events:      Neuro: Patient is able to open eyes spontaneously and intermittently follow commands. Patient is sedated.    Cards: MAP goal < 80. On Nitroprusside to help maintain MAP goal. LVAD numbers with flow <3. Cards II Fellow contacted and no further intervention to be carried out as of this. Hemodynamics numbers are marginal with patient's CI < 2.    Pulm: No changes to ventilator settings. Patient maintaining adequate oxygen saturation levels. ETT readjusted afterr being moved. Patient with bilateral LS auscultated.    GI/: TF infusing at goal. Patient is noted to have high residuals however, residuals are less than 500 mL and so no provider notification. Maintaining adequate UO. Loose BM and external fecal  in place.    Skin: No changes in skin.    Access: PAC to [facility], CVC to [facility], PIV x 2, Arterial line    Endo: BG monitoring. Patient received insulin administration x 2 this shift.    Heme: Patient's PAC to [facility] continues to ooze. External clotting devices applied to help control bleeding. Hgb level adequate not to require transfusion.    ID: Tmax = 98.3F.    Plan: Continue to monitor hemodynamic values, VS, drips, LVAD values. [place] any changes or update to MD.    For vital signs and complete assessments, please see documentation flowsheets.

## 2020-12-12 NOTE — PROGRESS NOTES
Major Shift Events:  Neuro status unchanged - sedated. Nipride started this evening - maintaining map goal <80. Lungs coarse. Veletri decreased to 10ng/kg/min.  Urethral cath in place - voiding adequate amounts. TF increased to new goal of 65cc/hr. Pulmonary cath placed at 1700. Several dressing changes due to bleeding. MD call and new sutures placed at 2200. Bivalirudin restarted.  Plan: Continue to closely monitor.  For vital signs and complete assessments, please see documentation flowsheets.

## 2020-12-13 NOTE — PLAN OF CARE
Major Shift Events:  NSR to sinus tachycardia up to the 130's. Dobutamine increased from 2.5 to 5 mcg/kg/min. Veletri decreased to 5. Respiratory rate decreased to 15 and tidal volume decreased to 400. See flow sheets for MARILYNN's. Continued bleeding at [place] site. Hemoglobin checked. Dr. Araujo at bedside to assess.   Plan: ABG. Based on results possibly stop Veletri. Possibly wean tomorrow.   For vital signs and complete assessments, please see documentation flowsheets.

## 2020-12-13 NOTE — PLAN OF CARE
Major Shift Events:      Neuro: Patient is able to open eyes spontaneously and intermittently follow commands. Patient is sedated, infusing fentanyl drip.     Cards: LVAD numbers with flow approximately 2.6. Hemodynamics numbers are marginal with patient's CI at 2.     Pulm: No changes to ventilator settings overnight. Patient maintaining adequate oxygen saturation levels. ETT readjusted after being moved. Patient with bilateral LS auscultated. Nebulized Flolan stopped at 2000 per Cards II Fellow.     GI/: TF infusing at goal. Patient is noted to have high residuals however, residuals are less than 500 mL and so no provider notification. Maintaining adequate UO. Loose BM and external fecal  in place.     Skin: No changes in skin.     Access: PAC to [facility], CVC to [facility], PIV x 2, Arterial line. PAC continues to ooze.      Endo: BG monitoring. Patient received insulin administration x 3 this shift.     Heme: Patient's PAC to [facility] continues to ooze significantly. Card II Fellow notified. No intervention as of this time. Hgb level adequate not to require transfusion.     ID: Tmax = 98.3F.    Plan: Monitor hemodynamics and blood count, monitor vent settings. Work towards PS settings on ventilator.    For vital signs and complete assessments, please see documentation flowsheets.

## 2020-12-13 NOTE — PROGRESS NOTES
Cards 2 Progress Note                                                               2020  Danielito Chu MRN: [id number]  Age: 65 year old, : 1955        Reason for admissoin:      COVID-19 Pneumonia        Assessment and Recommendation:     65 year old male admitted on 12/3/2020 with COVID pneumonia. He has a history of NICM (LVEF at dong of 15%) s/p ICD (2017), who underwent HM3 LVAD implant on 18 as DT, now BTT. He has a history of atrial arrythmia, s/p ICD, on amiodarone and metoprolol. He was admitted as transfer to MICU from [place] ED for AHRF 2/2 COVID pneumonia. 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 Over the course of hospitalization, his respiratory status progressively deteriorated and he was ultimately transferred back to the MICU and emergently intubated. He then developed an elevated troponin (peak 24.782) without ischemic EKG changes, concerning for myocarditis>ACS/coronary embolism. He then developed progressive decline of LVAD flow and was found to have sub-total occlusion of the LVAD outflow graft on repeat CTA. Started on dobutamine and maintaining adequate hemodynamics. Repeat TTE shows aortic valve opening with each beat. Given CXR findings, increasing FiO2, and worsening paO2, there is concern for worsening of his COVID pneumonia/ARDS.     Today's Plan:  - Wean epoprostenol as able  - Discontinue nitroprusside  - Increase Dobutamine to 5 mcg/kg/min     Neuro:  # Pain and sedation  - Analgesia: Fentanyl gtt + PRN pushes  - Oxycodone 10mg q4h  - Ativan 4mg q6h  - RASS goal -2/-3     Pulmonary:  # Acute hypoxemic respiratory failure 2/2 Severe COVID-19 infection c/b severe ARDS  - Transfered to [facility] on 12/3 oxymask 6L sating 96% and was transferred to the floor. On  acutely decompensated requiring mechanical ventilation, intubated. CXR showing bilateral worsening infiltrates. Stopped Remdesivir  (12/3-12/5) given transaminitis.   - Goal to keep PEEP to minimal amount to decrease RV stress   - Dexamethasone (12/3-12/12*)  - Wean flolan as able  - Abx as below   - Appreciate Pulmonary consultation with vent management.    Cardiovascular:   # NICM s/p LVAD 12/5/18  # Myocarditis  # RV Dysfunction  # Cardiogenic Shock  - Increase Dobutamine to 5 mcg/kg/min  - Discontinue Nitroprusside.  - Maintain CVP 8-10 and even fluid balance  - Wean epoprostenol as able  - Telemetry monitoring for ventricular dysrrythmias  - Trend LFTs   - Trend hemodynamics q6h     #Hx of Atrial tachycardia  - Now stable.      Hematology:   # COVID coagulopathy  # Supratherapuetic INR, reversed  # LVAD outflow thrombus  - Continue Aspirin 162mg BID, Dipyridamole 25mg TID  - Continue Bivalirudin, goal PTT 60-80  - TEG and platelet function daily  - ASA now assay: therapuetic  - Trend LDH      ID:  #LVAD driveline exudate  - Cultures NGTD  - Appreciate ID recommendations  - Continue antibiotics for a total of 7 days of coverage     # COVID 19  Tested + 11/30. LDH typically ~200. Elevated inflammatory markers with , , fibrinogen 646, d-dimer 1.3, normal WBC.  - Dexamethasone and remdesivir as above  - abx as above     Endocrine:  # History of DM  - No meds PTA. Last A1c 5.9%.  - High dose sliding scale insulin     GI/Nutrition:  # Nutrition  - Tube feeds at goal  - Nutrition consulted, appreciate recs     # Bowel regimen  - PRN suppository  - Scheduled Miralax and Senna     # Mesenteric thrombus  - CT 12/4 revealed small amount of nonocclusive thrombus within the proximal.         Diet:   Tube feeds  DVT Prophylaxis: On Bivalirudin gtt  Valentine Catheter: in place, indication: Strict 1-2 Hour I&O  Code Status:   FULL CODE    Patient discussed with staff attending, Dr. Araujo and the note reflects our joint plan. Please do not hesitate to call with questions or concerns.     Kinsey Majano MD    Cardiology Fellow  Pager:  [phone]        I have seen and examined the patient on December 12, 2020. I have discussed the care of this patient with the heart failure team and I agree with the assessment and plan as outlined in the note below. I have personally reviewed vital signs, hemodynamics, medications, laboratory values, and all diagnostic testing.      The patient is critically ill and requires ICU status for: Respiratory failure due to COVID-19, LVAD outflow thrombosis, cardiogenic shock     Active problems and management:   1. COVID-19 ARDS  2. Right heart failure  3. LVAD with outflow graft thrombosis  4. Troponin elevation concerning for COVID myocarditis   5. Shock, cardiogenic      65 year-old man with HM3 LVAD as BTT with COVID respiratory distress who is intubated treated with dexamethasone and remdesivir.      He had LVAD flow flow alarms with persistently low flow. His CTA demonstrated worsened thrombus occlusion of the outflow graft. Bivalrudin, ASA 162mg BID, and persantine continued to maximize his antithrombotic response.     PA catheter hemodynamics with continued acceptablel filling pressures but reduced cardiac index with elevated SVR despite nipride and low-dose dobutamine.  Have increased dobutamine to try and improve SVR and cardiac index; nipride discontineud.  Tolerated weaning of inhaled flolan.  Respiratory status improved.  Appreciate pulmonary assistance.  Pt will need to be extubated, have significant improvement from COVID and improvement in functional status prior to consideration for LVAD exchange.  Pt condition remains tenuous.     I have personally managed the following: temporary or durable mechanical circulatory support,anticoagulation and inotrope and parenteral vasodilator regimen.     Total critical care time spent by me at the bedside and/or in the ICU, excluding procedures was 40 minutes.      This includes review of all data, coordination of care with consulting services, adjustment and  personally managing: mechanical circulatory support: LVAD, anticoagulation, and serial assessment of the patient and hemodynamics.     Ashley Araujo MD  Section Head - Advanced Heart Failure, Transplantation and Mechanical Circulatory Support  Director - Adult Congenital and Cardiovascular Genetics Center  Associate Professor of Medicine, [facility]        Subjective     No acute events overnight. Patient continues to be intubated and sedated. He had oozing from his swan site.        Medications:     No current facility-administered medications on file prior to encounter.        allopurinol (ZYLOPRIM) 100 MG tablet, Take 2 tablets (200 mg) by mouth daily       amiodarone (PACERONE) 200 MG tablet, Take 1 tablet (200 mg) by mouth daily       amoxicillin (AMOXIL) 500 MG capsule, Take 4 capsules (2,000 mg) by mouth once as needed (Take 4 capsules (2000mg), one hour before any dental cleanings or procedures)       aspirin (ASA) 81 MG chewable tablet, Take 1 tablet (81 mg) by mouth daily       Blood Glucose Monitoring Suppl (BLOOD GLUCOSE MONITOR SYSTEM) w/Device KIT, three times a week       bumetanide (BUMEX) 1 MG tablet, Take 2 tablets (2 mg) by mouth 2 times daily       colchicine (COLCRYS) 0.6 MG tablet, One tab 0.6 mg on Mon/Wed/Fri x 1 month and if no gout flare up, stop it       enoxaparin (LOVENOX) 100 MG/ML syringe, Inject 100 mg (1 ml) every 12 hours as directed by the Anticoagulation Clinic. (Patient not taking: Reported on 7/16/2020)       losartan (COZAAR) 25 MG tablet, Take 1 tablet (25 mg) by mouth daily       metoprolol succinate ER (TOPROL XL) 25 MG 24 hr tablet, Take 1.5 tablets (37.5 mg) by mouth daily       potassium chloride ER (KLOR-CON) 10 MEQ CR tablet, Take 2 tablets (20 mEq) by mouth 2 times daily       spironolactone (ALDACTONE) 25 MG tablet, TAKE 1.5 TABLETS (37.5 MG) BY MOUTH DAILY       tacrolimus (PROTOPIC) 0.1 % external ointment, Apply topically 2 times daily (Patient not  taking: Reported on 7/16/2020)       traZODone (DESYREL) 100 MG tablet, TAKE 1 TABLET BY MOUTH EVERY DAY AT BEDTIME AS NEEDED FOR SLEEP (Patient not taking: Reported on 7/16/2020)       vitamin B1 (THIAMINE) 100 MG tablet, Take 1 tablet (100 mg) by mouth daily (Patient not taking: Reported on 7/16/2020)       warfarin ANTICOAGULANT (COUMADIN) 2 MG tablet, TAKE 1 & 1/2 TABLET BY MOUTH EACH DAY AT 6PM            Physical Exam:     B/P: 126/88, T: 98.7, P: 88, R: 30    Wt Readings from Last 4 Encounters:   12/12/20 90.7 kg (199 lb 15.3 oz)   07/02/20 107.5 kg (237 lb)   03/04/20 108.7 kg (239 lb 11.2 oz)   12/06/19 108.4 kg (239 lb)         Intake/Output Summary (Last 24 hours) at 12/12/2020 1904  Last data filed at 12/12/2020 1900  Gross per 24 hour   Intake 3176.46 ml   Output 2130 ml   Net 1046.46 ml       General Appearance:  Sedated and intubated  Respiratory: intubated and mechanically ventilated, poor air movement over the bases, no crackles  Cardiovascular: LVAD hum  GI: soft, ND, + BS  Skin: warm,  no rash  Ext: warm to touch, trace edema, easily palpable pulses in the bilateral radials  Neuro: sedated, opens eyes to questioning      Data:     Labs Reviewed on Admission    Troponin   Lab Results   Component Value Date    TROPI 22.609 () 12/06/2020    TROPI 24.782 () 12/06/2020    TROPI 21.830 () 12/06/2020    TROPI 0.471 () 12/04/2020    TROPI 0.475 () 12/04/2020     BMP  Recent Labs   Lab 12/12/20  0337 12/11/20  0434 12/10/20  1524 12/10/20  0414 12/09/20  1533 12/09/20  1533    133  --  134  --  141   POTASSIUM 4.1 3.8 4.2 3.9   < > 3.7   CHLORIDE 98 96  --  98  --  105   ASHLEE 8.6 8.6  --  8.2*  --  8.6   CO2 27 28  --  30  --  30   BUN 32* 26  --  25  --  25   CR 0.89 0.77  --  0.80  --  0.92   * 173*  --  186*  --  183*    < > = values in this interval not displayed.     CBC  Recent Labs   Lab 12/12/20  1656 12/12/20  0337 12/11/20  0434 12/10/20  0414   WBC 18.8* 15.2* 13.5* 14.8*    RBC 3.20* 3.54* 3.70* 3.65*   HGB 10.3* 11.2* 12.0* 11.8*   HCT 31.3* 34.3* 35.8* 36.2*   MCV 98 97 97 99   MCH 32.2 31.6 32.4 32.3   MCHC 32.9 32.7 33.5 32.6   RDW 15.5* 15.1* 15.2* 15.2*    340 341 375     INR  Recent Labs   Lab 12/08/20  0219 12/07/20  0348 12/06/20  0353   INR 3.92* 2.15* 5.29*      Hepatic Panel   Lab Results   Component Value Date    AST 44 12/12/2020     Lab Results   Component Value Date    ALT 86 12/12/2020     No results found for: BILICONJ   Lab Results   Component Value Date    BILITOTAL 1.1 12/12/2020     Lab Results   Component Value Date    ALBUMIN 2.4 12/12/2020     Lab Results   Component Value Date    PROTTOTAL 5.8 12/12/2020      Lab Results   Component Value Date    ALKPHOS 97 12/12/2020

## 2020-12-13 NOTE — PROGRESS NOTES
Summary:  65 year old with NICM s/p LVAD admitted with COVID ARDS c/b LVAD outflow clot and septic versus cardiogenic shock. Admitted 12/3. Intubated 12/4.     S: Now off flolan. Doing well today oxygenation wise. No major events.     O: Afebrile. MAPs mostly 60-70s. Sating 90s.    Tachypneic to 30s this morning but coming down.   ABG this AM 7.44/63/68/46  Net neg ~1L yesterday. CVP ~6-10. On dobutamine at 5. Off flolan.     PHYSICAL EXAMINATION:  General: Sedated, opens eyes to voice. Closes eyes to command. No other commands followed.   HEENT: ETT and OG in place  Pulm/Resp: Clear breath sounds bilaterally, breathing non-labored, synchronous to ventilator  CV: RRR murmur from LVAD A-line showing pulsatile flow  Abdomen: Soft, non-distended, non-tender.  : brown catheter in place.   Ext: Cool, without significant edema, + radial pulses present  Incisions/Skin: 2 second capillary refill.     Labs and imaging reviewed with staff.     Assessment/Plan:    # Hypoxic respiratory failure 2/2 covid ARDS, improved  # LVAD with outflow track clot, pulsatile flow on ABG, EF <20  # RV dysfunction and cardiogenic shock  Improvement in consolidations previously seen on CT. Increased clot burned within the LVAD. Hypoxia is improving.     Oxygenation:  - Doing great. Now off flolan and on minimal vent settings.   - PST today but won't likely be ready to extubate due to tachycardia, tachypnea, WBC 18  - Recommend T-piece (0/0) PST for 20-30 min prior to extubation in this patient with severely reduced EF to see if he tolerates the increased afterload    Ventilation:  - pH 7.44 today so better than yesterday, will continue current settings    Patient discussed with Dr. Snow.     Kely Joseph MD  Pulmonary and Critical Care Fellow

## 2020-12-14 NOTE — PROGRESS NOTES
Summary:  65 year old with NICM s/p LVAD admitted with COVID ARDS c/b LVAD outflow clot and septic versus cardiogenic shock. Admitted 12/3. Intubated 12/4. Now on on normal PEEP and FiO2, off flolan since 12/13.     S: Doing well oxygenation wise today. Oozing from the [place] site ongoing. + diarrhea.     O: Afebrile. MAPs mostly 60-70s. Sating 90s. HR in the 120s (increased after PST)   Tachypneic to 30s but 40s on PST.   ABG this AM 7.49/78/39  Net neg +500cc yesterday. CVP ~3-8. On dobutamine at 5. Off flolan.     PHYSICAL EXAMINATION:  General: Sedated, did not open eyes to my voice today, but per report was doing so overnight  HEENT: ETT and OG in place  Pulm/Resp: Clear breath sounds bilaterally, breathing non-labored, overbreathing the vent  CV: RRR murmur from LVAD A-line showing pulsatile flow  Abdomen: Soft, non-distended, non-tender.  : brown catheter in place.   Ext: Cool, without significant edema, + radial pulses present  Incisions/Skin: 2 second capillary refill, clean bandage newly chnages over swan with bloody chux underneath.      Labs and imaging reviewed with staff.     Assessment/Plan:    # Hypoxic respiratory failure 2/2 covid ARDS, improved  # LVAD with outflow track clot, pulsatile flow on ABG, EF <20  # RV dysfunction and cardiogenic shock  Improvement in consolidations previously seen on CT. Increased clot burned within the LVAD. Hypoxia is improving.     Oxygenation:  - Doing great. Now off flolan and on minimal vent settings. PST today for 40 min but had low volumes and appeared uncomfortable on fent gtt  - Objective now is to wean sedation. Please start precedex and wean down fentanyl as able.   - (With high WBC count and new infectious workup, weaning might delayed)  - Recommend T-piece (0/0) PST for 20-30 min prior to extubation in this patient with severely reduced EF to see if he tolerates the increased afterload    Ventilation:  - Metabolic alkalosis + resp alkalosis. Very low TV  but high RR possible from discomfort.   - If cdiff neg, would start immodium    Patient discussed with Dr. Skelton.     Kely Joseph MD  Pulmonary and Critical Care Fellow  [phone]

## 2020-12-14 NOTE — PROGRESS NOTES
LVAD Social Work Services Progress Note      Date of Initial Social Work Evaluation: 10/23/20. Again in May 2019  Collaborated with: Chart Review    Data: Fabiano remains inpatient in ICU due to COVID Pneumonia. Continues to be intubated and on ventilator. It appears as though he is progressing and there is discussion about extubation.  Intervention: Chart Review  Assessment: Just monitoring progress  Education provided by SW: None  Plan:    Discharge Plans in Progress: Unknown plan at this time    Barriers to d/c plan: Medical condition-ICU intubated    Follow up Plan: SW will continue to follow

## 2020-12-14 NOTE — PLAN OF CARE
Major Shift Events:  Pt would open eyes to voice but did not follow commands for me. Only on Fentanyl and decreased to 50 today. Tried to PS x2 today. Pt failed due to low volumes. :LVAD numbers have been Flow 2.4-2.6, PI 4-4.6, Speed 5500, Power 3.6-3.8 and Hematocrit is remaining set at 20 per MD. Put a rectal tube in due to blowout of rectal pouch. SWAN is still oozing and PA numbers were 28/12-16. CVP 6 and 5. Timmy numbers in chart. Good urine output.   Plan: Continue to pressure support and try to work towards extubation. Continue to monitor and notify MD of changes.   For vital signs and complete assessments, please see documentation flowsheets.

## 2020-12-14 NOTE — PLAN OF CARE
Major Shift Events: Frequent low flow alarms, MD & VAD coordinator notified. 2000 lasix dose given, CVP 8 with LVAD low flow alarms, discussed with Dr. Jade. Hemodynamics stable, see flowsheet. R. internal jugular persistently bleeding, hgb 7.7. LVAD flows consistently lower on L side. No other acute concerns noted.   Plan: Continue POC.   For vital signs and complete assessments, please see documentation flowsheets.

## 2020-12-14 NOTE — PROCEDURES
C sabrina (12/14/2020):    Drips: Dobutamine 5 mcg/kg/min    RA: 5  PA: 30/12 (17)  W: 10    CO/CI: 5.0/2.4    SVR: 992      SpO2: 97%  SVO2: 45  Hgb: 7.4  HR: 130  MAP: 67    Kinsey Majano MD   Cardiology fellow  Pager: [phone]

## 2020-12-14 NOTE — PROGRESS NOTES
I stopped by to talk with Fabiano's RN. She confirmed Fabiano is staffed 1:1. His alarms are extend silenced. His flow is hovering around 2.4 and his hematocrit is set at 20,  the lowest level. The pump temp is 47 degrees C, baseline.

## 2020-12-14 NOTE — PROGRESS NOTES
.           Cards 2 Progress Note       Danielito Chu MRN: [id number]  Age: 65 year old, : 1955        Reason for admissoin:      COVID-19 Pneumonia        Assessment and Recommendation:     65 year old male admitted on 12/3/2020 with COVID pneumonia. He has a history of NICM (LVEF at dong of 15%) s/p ICD (2017), who underwent HM3 LVAD implant on 18 as BTT. He was admitted as transfer to MICU from [place] ED for AHRF 2/2 COVID pneumonia. 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 Over the course of hospitalization, his respiratory status progressively deteriorated and he was ultimately transferred back to the MICU and emergently intubated. He then developed an elevated troponin (peak 24.782) without ischemic EKG changes, concerning for COVID myocarditis>ACS/coronary embolism. He then developed progressive decline of LVAD flow and was found to have sub-total occlusion of the LVAD outflow graft on repeat CTA. Started on dobutamine and maintaining adequate hemodynamics. Repeat TTE shows aortic valve opening with each beat. Given CXR findings, increasing FiO2, and worsening paO2, there is concern for worsening of his COVID pneumonia/ARDS.     Today's Plan:  - Continue Dobutamine to 5 mcg/kg/min  - PS trial and possible extubation  - Will pull out the swan today.  - Pan Culture today.  - TSH, T3, T4 - Pending  - Titrate down pain meds.     Neuro:  # Pain and sedation  - Analgesia: Fentanyl gtt + PRN pushes  - Decrease Oxycodone to 5 mg q6h  - Decrease Ativan to 1 mg q6h  - Sedation holiday    Cardiovascular:   # NICM s/p LVAD 18  # Myocarditis  # RV Dysfunction  # Cardiogenic Shock  -  [place] (): CVP: 6; PA:  (17) W: 12; CO/CI: 4.4/2.1; SVR: 1145; SVO2: 51  -  [place] (): CVP: 6; PA:  (17) W: 10; CO/CI: 4.5/2.1; SVR: 1208; SVO2: 44  -  Continue Dobutamine to 5 mcg/kg/min  -  Maintain CVP 8-10 and even fluid balance  - Telemetry  monitoring for ventricular dysrhythmias  -  Hold lasix for today.  - Trend hemodynamics q6h    # LVAD outflow thrombus  - He continues to have low flow alarms.  - Most recent CTA showed worsened thrombus occlusion of the outflow graft.  - Continue Aspirin 162 mg BID, Dipyridamole 25mg TID  - Continue Bivalirudin, goal PTT 60-80  - Monitor CBC daily.   - Trend LDH     #Hx of Atrial tachycardia  - Now stable.    Pulmonary:  # Acute hypoxemic respiratory failure 2/2 Severe COVID-19 infection c/b severe ARDS  - Finished a course of steroids and remdesivir.  - Goal to keep PEEP to minimal amount to decrease RV stress   - Dexamethasone (12/3-12/12)  - Trial of PS to see if patient is able to be extubated.   - Appreciate Pulmonary consultation with vent management.    Hematology:   # Anemia  - Hgb down trending.  - Likely bleeding from the swan site.  - Will remove swan today and monitor his hgb.    # COVID coagulopathy  # LVAD outflow thrombus  - Continue Aspirin 162mg BID, Dipyridamole 25mg TID  - Continue Bivalirudin, goal PTT 60-80  -  Monitor CBC daily.   - Trend LDH      ID:  #Leukocystosis  - Will pan culture today.  - He has finished 7 day course of antibiotics in the past (last abx dose 12/11)    #LVAD driveline exudate  - Cultures grew Cutibacterium (Propionibacterium) acnes   - Received antibiotic course Zosyn (12/5 - 12/11)    # COVID 19  - Tested + 11/30.   - Completed Dexamethasone and remdesivir.     Endocrine:  # History of DM  - No meds PTA. Last A1c 5.9%.  - High dose sliding scale insulin  - Continue Lantus 20 units at bed time.     GI/Nutrition:  # Nutrition  - Tube feeds at goal  - Nutrition consulted, appreciate recs     # Bowel regimen  - PRN suppository  - Scheduled Miralax and Senna     # Mesenteric thrombus  - CT 12/4 revealed small amount of nonocclusive thrombus within the proximal.   - Continue Bivalirudin, goal PTT 60-80     Endo:  # Low T3  - likely due to inflammatory condition.  - Will  recheck TSH/T3/T4 today.  - Endo on board. Appreciate recs.    Diet:   Tube feeds  DVT Prophylaxis: On Bivalirudin gtt  Valentine Catheter: in place, indication: Strict 1-2 Hour I&O  Code Status:   FULL CODE    Patient discussed with staff attending, Dr. Araujo and the note reflects our joint plan. Please do not hesitate to call with questions or concerns.     Kinsey Majano MD    Cardiology Fellow  Pager: [phone]      I have reviewed today's vital signs, notes, medications, labs and imaging. I have also seen and examined the patient and agree with the findings and plan as outlined above.    Ashley Araujo MD  Section Head - Advanced Heart Failure, Transplantation and Mechanical Circulatory Support  Director - Adult Congenital and Cardiovascular Genetics Center  Associate Professor of Medicine, [facility]        Subjective     No acute events overnight. Patient continues to be intubated and sedated. He had low flow alarms overnight. He continues to ooze from the [place] site.       Physical Exam:     B/P: 126/88, T: 98.7, P: 88, R: 30    Wt Readings from Last 4 Encounters:   12/14/20 89.6 kg (197 lb 8.5 oz)   07/02/20 107.5 kg (237 lb)   03/04/20 108.7 kg (239 lb 11.2 oz)   12/06/19 108.4 kg (239 lb)     Intake/Output Summary (Last 24 hours) at 12/12/2020 1904  Last data filed at 12/12/2020 1900  Gross per 24 hour   Intake 3176.46 ml   Output 2130 ml   Net 1046.46 ml       General Appearance:  Sedated and intubated. Following commands.  Respiratory: Intubated and mechanically ventilated, poor air movement over the bases, no crackles  Cardiovascular: LVAD hum  GI: soft, ND, + BS  Skin: warm,  no rash  Ext: warm to touch, trace edema, easily palpable pulses in the bilateral radials  Neuro: sedated, opens eyes to questioning      Data:     Labs Reviewed on Admission    Troponin   Lab Results   Component Value Date    TROPI 22.609 () 12/06/2020    TROPI 24.782 () 12/06/2020    TROPI 21.830 ()  12/06/2020    TROPI 0.471 () 12/04/2020    TROPI 0.475 () 12/04/2020     BMP  Recent Labs   Lab 12/14/20 0340 12/13/20 2031 12/13/20 0344 12/12/20  0337 12/11/20  0434     --  136 133 133   POTASSIUM 3.8 4.0 4.0 4.1 3.8   CHLORIDE 102  --  101 98 96   ASHLEE 8.1*  --  8.2* 8.6 8.6   CO2 28  --  26 27 28   BUN 43*  --  32* 32* 26   CR 0.91  --  0.78 0.89 0.77   *  --  227* 193* 173*     CBC  Recent Labs   Lab 12/14/20 0340 12/13/20 0344 12/12/20 1656 12/12/20 0337   WBC 26.6* 18.4* 18.8* 15.2*   RBC 2.36* 2.80* 3.20* 3.54*   HGB 7.7* 9.0* 10.3* 11.2*   HCT 24.1* 27.5* 31.3* 34.3*   * 98 98 97   MCH 32.6 32.1 32.2 31.6   MCHC 32.0 32.7 32.9 32.7   RDW 17.1* 15.6* 15.5* 15.1*    291 318 340     INR  Recent Labs   Lab 12/08/20  0219   INR 3.92*      Hepatic Panel   Lab Results   Component Value Date    AST 44 12/12/2020     Lab Results   Component Value Date    ALT 86 12/12/2020     No results found for: BILICONJ   Lab Results   Component Value Date    BILITOTAL 1.1 12/12/2020     Lab Results   Component Value Date    ALBUMIN 2.4 12/12/2020     Lab Results   Component Value Date    PROTTOTAL 5.8 12/12/2020      Lab Results   Component Value Date    ALKPHOS 97 12/12/2020

## 2020-12-15 NOTE — PROGRESS NOTES
Endocrine brief note    -Endocrine was asked to comment on thyroid function labs of this patient on 12/8/2020   -Noted repeat labs 12/14 today showed TSH still very much within the normal limits (4.14) which is reassuring. Low T3 but result could be unreliable in setting of critical illness.   -Would recommend repeat another thyroid function labs in 1 week to check trend. We will order and follow up on it

## 2020-12-15 NOTE — PROCEDURES
[facility]     Double Lumen Midline Placement    Date/Time: 12/15/2020 3:26 PM  Performed by: Michelle Montelongo RN  Authorized by: Kinsey Majano MD   Indications: vascular access    UNIVERSAL PROTOCOL   Site Marked: Yes  Prior Images Obtained and Reviewed:  Yes  Required items: Required blood products, implants, devices and special equipment available    Patient identity confirmed:  Verbally with patient  Patient was reevaluated immediately before administering moderate or deep sedation or anesthesia  Confirmation Checklist:  Patient's identity using two indicators, relevant allergies, procedure was appropriate and matched the consent or emergent situation and correct equipment/implants were available  Time out: Immediately prior to the procedure a time out was called    Universal Protocol: the Joint Commission Universal Protocol was followed    Preparation: Patient was prepped and draped in usual sterile fashion           ANESTHESIA    Anesthesia: See MAR for details  Local Anesthetic:  Lidocaine 1% without epinephrine  Anesthetic Total (mL):  4      SEDATION    Patient Sedated: No        Preparation: skin prepped with ChloraPrep  Skin prep agent: skin prep agent completely dried prior to procedure  Sterile barriers: maximum sterile barriers were used: cap, mask, sterile gown, sterile gloves, and large sterile sheet  Hand hygiene: hand hygiene performed prior to central venous catheter insertion  Type of line used: Midline  Catheter type: double lumen  Lumen type: non-valved  Catheter size: 5 Fr  Brand: Bard  Lot number: [id number]  Placement method: venipuncture, MST and ultrasound  Number of attempts: 1  Successful placement: yes  Orientation: left  Location: cephalic vein (vein diameter- 0.52cm)  Arm circumference: adults 10 cm  Extremity circumference: 34  Visible catheter length: 1  Total catheter length: 20  Dressing and securement: blood cleaned with CHG,  chlorhexidine disc applied, site cleaned, statlock and sterile dressing applied  Post procedure assessment: blood return through all ports and free fluid flow  PROCEDURE   Patient Tolerance:  Patient tolerated the procedure well with no immediate complications  Describe Procedure: Midline okay to use.

## 2020-12-15 NOTE — PROGRESS NOTES
.           Cards 2 Progress Note       Danielito Chu MRN: [id number]  Age: 65 year old, : 1955        Reason for admissoin:      COVID-19 Pneumonia        Assessment and Recommendation:     65 year old male admitted on 12/3/2020 with COVID pneumonia. He has a history of NICM (LVEF at dong of 15%) s/p ICD (2017), who underwent HM3 LVAD implant on 18 as BTT. He was admitted as transfer to MICU from [place] [facility] for AHRF 2/2 COVID pneumonia. 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 Over the course of hospitalization, his respiratory status progressively deteriorated and he was ultimately transferred back to the MICU and emergently intubated. He then developed an elevated troponin (peak 24.782) without ischemic EKG changes, concerning for COVID myocarditis>ACS/coronary embolism. He then developed progressive decline of LVAD flow and was found to have sub-total occlusion of the LVAD outflow graft on repeat CTA. Started on dobutamine and maintaining adequate hemodynamics. Repeat TTE shows aortic valve opening with each beat. Given CXR findings, increasing FiO2, and worsening paO2, there is concern for worsening of his COVID pneumonia/ARDS.     Today's Plan:  - Continue Dobutamine to 5 mcg/kg/min.  - Continue PS and work on possible extubation  - Will pull out the central line today and place a midline, in light of fever overnight and increased WBC.  - Pan Culture and Pan CT today.  - Will give 2 units of pRBC today.  - Volume: Goal net even to positive +500.     Neuro:  # Pain and sedation  - Analgesia: Precedex gtt   - Continue Oxycodone to 5 mg q6h  - Continue Ativan to 1 mg q6h    Cardiovascular:   # NICM s/p LVAD 18  # Myocarditis  # RV Dysfunction  # Cardiogenic Shock  -  [place] (): CVP: 6; PA:  (17) W: 12; CO/CI: 4.4/2.1; SVR: 1145; SVO2: 51  -  [place] (): CVP: 6; PA:  (17) W: 10; CO/CI: 4.5/2.1; SVR: 1208; SVO2: 44  -  (12/15): CVP: 9; CO/CI: 4.9/2.3; SVO2:42  -  Continue Dobutamine to 5 mcg/kg/min  -  Maintain CVP 8-10 and even fluid balance  - Telemetry monitoring for ventricular dysrhythmias  -  Will give 20 mg of IV lasix for today, given that he received 2 units of pRBCs.  - Trend hemodynamics q6h    # LVAD outflow thrombus  - He continues to have low flow alarms.  - Most recent CTA showed worsened thrombus occlusion of the outflow graft.  - Continue Aspirin 162 mg BID, Dipyridamole 25mg TID  - Continue Bivalirudin, goal PTT 60-80  - Monitor CBC daily.   - Trend LDH     #Hx of Atrial tachycardia  - Now stable.    # Septic shock   - Febrile overnight and also required vasopressors.  - Not on pressors since this morning.  - Cultures pending  - On broad spectrum antibiotics (Vanc/Zosyn)  - CT chest showed diffuse interstitial and groundglass opacities throughout the lungs  consistent with COVID-19 pneumonia.     Pulmonary:  # Acute hypoxemic respiratory failure 2/2 Severe COVID-19 infection c/b severe ARDS  - Finished a course of steroids and remdesivir.  - Goal to keep PEEP to minimal amount to decrease RV stress   - Dexamethasone (12/3-12/12)  - Trial of PS to see if patient is able to be extubated.   - Appreciate Pulmonary consultation with vent management.    Hematology:   # Acute Anemia  - likely slow bleed from the swan site for couple of days.  - Hgb down trending.  - Received 2 units of pRBC today.  - [place] was removed yesterday. Will continue to montior Hgb.  - CT scan showed no concerns for RP bleed.    # COVID coagulopathy  # LVAD outflow thrombus  - Continue Aspirin 162mg BID, Dipyridamole 25mg TID  - Continue Bivalirudin, goal PTT 60-80  -  Monitor CBC daily.   - Trend LDH      ID:  # Septic shock   - Febrile overnight and also required vasopressors.  - Not on pressors since this morning.  - Cultures pending  - On broad spectrum antibiotics (Vanc/Zosyn)  - CT chest showed diffuse interstitial and groundglass  opacities throughout the lungs  consistent with COVID-19 pneumonia.     #LVAD driveline exudate  - Cultures grew Cutibacterium (Propionibacterium) acnes   - Received antibiotic course Zosyn (12/5 - 12/11)    # COVID 19  - Tested + 11/30.   - Completed Dexamethasone and remdesivir.     Endocrine:  # History of DM  - No meds PTA. Last A1c 5.9%.  - High dose sliding scale insulin  - Continue Lantus 30 units at bed time.     GI/Nutrition:  # Nutrition  - Tube feeds at goal  - Nutrition consulted, appreciate recs     # Bowel regimen  - PRN suppository  - Scheduled Miralax and Senna     # Mesenteric thrombus  - CT 12/4 revealed small amount of nonocclusive thrombus within the proximal.   - CT 12/15 -> Celiac artery and superior mesenteric arteries are patent. There are no other acute findings in the abdomen or pelvis.  - Continue Bivalirudin, goal PTT 60-80     Endo:  # Low T3  - likely due to inflammatory condition.  - Endo on board. Appreciate recs.    Diet:   Tube feeds  DVT Prophylaxis: On Bivalirudin gtt  Valentine Catheter: in place, indication: Strict 1-2 Hour I&O  Code Status:   FULL CODE    Patient discussed with staff attending, Dr. Araujo and the note reflects our joint plan. Please do not hesitate to call with questions or concerns.     Kinsey Majano MD    Cardiology Fellow  Pager: [phone]      Advanced Heart Failure Physician Critical Care Attestation   I have seen and examined the patient on December 15, 2020. I have discussed the care of this patient with the heart failure team and I agree with the assessment and plan as outlined in the note below. I have personally reviewed vital signs, hemodynamics, medications, laboratory values, and all diagnostic testing.      The patient is critically ill and requires ICU status for: Respiratory failure due to COVID-19, LVAD outflow thrombosis, cardiogenic shock     Active problems and management:   1. COVID-19 ARDS  2. Right heart failure  3. LVAD with  outflow graft thrombosis  4. Troponin elevation concerning for COVID myocarditis   5. Mixed shock, septic/cardiogenic      65 year-old man with HM3 LVAD as BTT with COVID respiratory distress who is intubated treated with dexamethasone and remdesivir.      He had LVAD flow flow alarms with persistently low flow. His CTA demonstrated worsened thrombus occlusion of the outflow graft. Bivalrudin, ASA 162mg BID, and persantine continued to maximize his antithrombotic response.      Overnight patient was febrile, hypotensive requiring initiation of norepinephrine as well as drop in Hb.  IVF and antibiotics given with improvement in hemodynamics.  Today central line removed and midline placed.  CT chest with COVID findings but no acute changes.  CT abdomen with no retroperitoneal hematoma.  Celiac artery and  superior mesenteric arteries are patent and unchanged outflow cannula thrombus.   Appreciate pulmonary assistance with vent management with goal for extubation in next few days.  Pt will need to be extubated, have significant improvement from COVID and improvement in functional status prior to consideration for LVAD exchange.  Pt condition remains tenuous.     I have personally managed the following: temporary or durable mechanical circulatory support,anticoagulation and inotrope and parenteral vasodilator regimen.     Total critical care time spent by me at the bedside and/or in the ICU, excluding procedures was 60 minutes.      This includes review of all data, coordination of care with consulting services, adjustment and personally managing: norepinephrine, mechanical circulatory support: LVAD, anticoagulation, and serial assessment of the patient and hemodynamics.     Ashley Araujo MD  Section Head - Advanced Heart Failure, Transplantation and Mechanical Circulatory Support  Director - [facility]  Associate Professor of Medicine, [employer] [facility]         Subjective     Febrile overnight. Patient required vaso and NE for a short period. His Hgb also dropped to 5.9. Patient receiv      Physical Exam:     B/P: 126/88, T: 98.7, P: 88, R: 30    Wt Readings from Last 4 Encounters:   12/15/20 91.5 kg (201 lb 11.5 oz)   07/02/20 107.5 kg (237 lb)   03/04/20 108.7 kg (239 lb 11.2 oz)   12/06/19 108.4 kg (239 lb)     Intake/Output Summary (Last 24 hours) at 12/12/2020 1904  Last data filed at 12/12/2020 1900  Gross per 24 hour   Intake 3176.46 ml   Output 2130 ml   Net 1046.46 ml       General Appearance:  Sedated and intubated.  Respiratory: Intubated and mechanically ventilated, poor air movement over the bases, no crackles  Cardiovascular: LVAD hum  GI: soft, ND, + BS  Skin: warm,  no rash  Ext: warm to touch, trace edema, easily palpable pulses in the bilateral radials  Neuro: sedated, opens eyes to questioning.      Data:     Labs Reviewed on Admission    Troponin   Lab Results   Component Value Date    TROPI 22.609 () 12/06/2020    TROPI 24.782 () 12/06/2020    TROPI 21.830 () 12/06/2020    TROPI 0.471 () 12/04/2020    TROPI 0.475 () 12/04/2020     BMP  Recent Labs   Lab 12/15/20  0424 12/14/20  1347 12/14/20  0340 12/13/20 2031 12/13/20  0344 12/12/20  0337     --  137  --  136 133   POTASSIUM 4.0 4.5 3.8 4.0 4.0 4.1   CHLORIDE 108  --  102  --  101 98   ASHLEE 8.0*  --  8.1*  --  8.2* 8.6   CO2 29  --  28  --  26 27   BUN 37*  --  43*  --  32* 32*   CR 0.90  --  0.91  --  0.78 0.89   *  --  271*  --  227* 193*     CBC  Recent Labs   Lab 12/15/20  0424 12/14/20  1227 12/14/20  0340 12/13/20  0344   WBC 26.0* 30.8* 26.6* 18.4*   RBC 1.75* 2.17* 2.36* 2.80*   HGB 5.9* 7.4* 7.7* 9.0*   HCT 18.5* 22.3* 24.1* 27.5*   * 103* 102* 98   MCH 33.7* 34.1* 32.6 32.1   MCHC 31.9 33.2 32.0 32.7   RDW 18.0* 17.4* 17.1* 15.6*    262 266 291       Hepatic Panel   Lab Results   Component Value Date    AST 44 12/12/2020     Lab Results   Component Value  Date    ALT 86 12/12/2020     No results found for: BILICONJ   Lab Results   Component Value Date    BILITOTAL 1.1 12/12/2020     Lab Results   Component Value Date    ALBUMIN 2.4 12/12/2020     Lab Results   Component Value Date    PROTTOTAL 5.8 12/12/2020      Lab Results   Component Value Date    ALKPHOS 97 12/12/2020

## 2020-12-15 NOTE — PHARMACY-VANCOMYCIN DOSING SERVICE
Pharmacy Vancomycin Initial Note  Date of Service 2020  Patient's  1955  65 year old, male    Indication: Sepsis    Current estimated CrCl = Estimated Creatinine Clearance: 92.3 mL/min (based on SCr of 0.91 mg/dL).    Creatinine for last 3 days  2020:  3:37 AM Creatinine 0.89 mg/dL  2020:  3:44 AM Creatinine 0.78 mg/dL  2020:  3:40 AM Creatinine 0.91 mg/dL    Recent Vancomycin Level(s) for last 3 days  No results found for requested labs within last 72 hours.      Vancomycin IV Administrations (past 72 hours)      No vancomycin orders with administrations in past 72 hours.                Nephrotoxins and other renal medications (From now, onward)    Start     Dose/Rate Route Frequency Ordered Stop    12/15/20 0800  vancomycin 1500 mg in 0.9% NaCl 250 ml intermittent infusion 1,500 mg      1,500 mg  over 90 Minutes Intravenous EVERY 12 HOURS 20 1910      20 1930  vancomycin (VANCOCIN) 2,250 mg in sodium chloride 0.9 % 250 mL intermittent infusion      2,250 mg (central catheter)  over 60 Minutes Intravenous ONCE 20 1907      20 1900  piperacillin-tazobactam (ZOSYN) 4.5 g vial to attach to  mL bag      4.5 g  over 30 Minutes Intravenous EVERY 6 HOURS 20 1857      20 0500  DOBUTamine (DOBUTREX) 1,000 mg in D5W 250 mL infusion (adult max conc)      5 mcg/kg/min × 103.2 kg (Dosing Weight)  7.7 mL/hr  Intravenous CONTINUOUS 20 0434            Contrast Orders - past 72 hours (72h ago, onward)    None                Plan:  1.  Start vancomycin  2250 mg iv x 1 then 1500 mg iv q12h   2.  Goal Trough Level: 15-20 mg/L   3.  Pharmacy will check trough levels as appropriate in 1-3 Days.    4. Serum creatinine levels will be ordered daily for the first week of therapy and at least twice weekly for subsequent weeks.    5. [place] method utilized to dose vancomycin therapy: Method 2    Lauren Mendez, JesusD

## 2020-12-15 NOTE — PLAN OF CARE
"/61   Pulse 128   Temp 101.2  F (38.4  C)   Resp (!) 32   Ht 1.778 m (5' 10\")   Wt 89.6 kg (197 lb 8.5 oz)   SpO2 99%   BMI 28.34 kg/m       Neuro: Sedated. When fentanyl paused this am, pt squeezed RUE, and withdrew lowers to pain. However, got very tachypneic and tachycardic up to 130s, per team, switch to precedex to make pt comfortable. Precedex @0.4gtt. Currently, grimaces to pain, PERRL.     Cardiac: Low grade temps throughout the day, last temp 101.6, team updated, ice packs placed, tylenol adm. CVPs:6,5 (see flowsheets for FICKS) SVO2 in 40s.  @5, MAPs >60 goal per team. No pressors. LVAD alarming all day, VAD coordinator and team aware. P.F:2.3-2.4, P.I: 3.5-4.3, P:P:3.8, PS:5500. Hematocrit set @20 per VAD coordinator.     Respiratory: Vent: FIO2:40%, R:15, PEEP:5, TV:500. Ps:7/5 for ~30min this am and ~2hrs this evening.      GI/: Rectal tube with small output. Valentine with ~100q2 hrs. TF@goal., residuals 140,150,90.     Plan: [place] removed, d/t oozing and bleeding all day. Angiomax paused before and after ~1hr. Blood, urine, sputum cx sent. Restarted at set rate.  Will continue to monitor and update team with changes.   "

## 2020-12-15 NOTE — PLAN OF CARE
Major Shift Events:  On precedex overnight. Pt seeming uncomfortable, dex turned to 0.6.  Transitioned off of levo to VASO. x1 PRBC given for low hemoglobin. VAD Flow and PI down trending flow now 2 or below. Fluid boluses given for high lactic and low flow. Vent settings unchanged, sputum sent. Valentine with good Urine output. TF goal 65. Electrolytes replaced.   Plan: Continue to monitor and notify MD of changes  For vital signs and complete assessments, please see documentation flowsheets.

## 2020-12-16 NOTE — PROGRESS NOTES
.           Cards 2 Progress Note       Danielito Chu MRN: [id number]  Age: 65 year old, : 1955        Reason for admissoin:      COVID-19 Pneumonia        Assessment and Recommendation:     65 year old male admitted on 12/3/2020 with COVID pneumonia. He has a history of NICM (LVEF at dong of 15%) s/p ICD (2017), who underwent HM3 LVAD implant on 18 as BTT. He was admitted as transfer to MICU from [place] ED for AHRF 2/2 COVID pneumonia. 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 Over the course of hospitalization, his respiratory status progressively deteriorated and he was ultimately transferred back to the MICU and emergently intubated. He then developed an elevated troponin (peak 24.782) without ischemic EKG changes, concerning for COVID myocarditis>ACS/coronary embolism. He then developed progressive decline of LVAD flow and was found to have sub-total occlusion of the LVAD outflow graft on repeat CTA. Started on dobutamine and maintaining adequate hemodynamics. Repeat TTE shows aortic valve opening with each beat. Given CXR findings, increasing FiO2, and worsening paO2, there is concern for worsening of his COVID pneumonia/ARDS.     Today's Plan:  - Continue Dobutamine to 5 mcg/kg/min.  - Continue PS and work on possible extubation  - Will put in a new art line today.  - Volume: Goal net negative 1-2 L.  - Decrease Pain meds     Neuro:  # Pain and sedation  - Analgesia: Precedex gtt   - Decrease Oxycodone to 5 mg q12h  - Decrease Ativan to 1 mg q12h    Cardiovascular:   # NICM s/p LVAD 18  # Myocarditis  # RV Dysfunction  -  [place] (): CVP: 6; PA:  (17) W: 12; CO/CI: 4.4/2.1; SVR: 1145; SVO2: 51  -  [place] (): CVP: 6; PA:  (17) W: 10; CO/CI: 4.5/2.1; SVR: 1208; SVO2: 44  - (12/15): CVP: 9; CO/CI: 4.9/2.3; SVO2:42  -  Continue Dobutamine to 5 mcg/kg/min  -  Fluid Balance: Net neg 1-2L today.  - Telemetry monitoring  for ventricular dysrhythmias  -  Will give 80 mg of IV lasix AM and 40 mg IV in the PM.  - Trend hemodynamics q6h    # LVAD outflow thrombus  - He continues to have low flow alarms.  - Most recent CTA showed worsened thrombus occlusion of the outflow graft.  - Continue Aspirin 162 mg BID, Dipyridamole 25mg TID  - Continue Bivalirudin, goal PTT 60-80  - Monitor CBC daily.   - Trend LDH     #Hx of Atrial tachycardia  #NSVT  - Now stable.    # Septic shock - improved  - Not on pressors at this time.  - Cultures pending  - On broad spectrum antibiotics (Vanc/Zosyn)  - CT chest showed diffuse interstitial and groundglass opacities throughout the lungs  consistent with COVID-19 pneumonia.     Pulmonary:  # Acute hypoxemic respiratory failure 2/2 Severe COVID-19 infection c/b severe ARDS  - Finished a course of steroids and remdesivir.  - Goal to keep PEEP to minimal amount to decrease RV stress   - Dexamethasone (12/3-12/12)  - Trial of PS to see if patient is able to be extubated.   - Appreciate Pulmonary team with vent management.    Hematology:   # Acute blood loss Anemia  - likely slow bleed from the swan site for couple of days.  - Hgb stable.  - Received 2 units of pRBC on 12/15/20.  - [place] was removed yesterday. Will continue to montior Hgb.  - CT scan showed no concerns for RP bleed.    # COVID coagulopathy  # LVAD outflow thrombus  - Continue Aspirin 162mg BID, Dipyridamole 25mg TID  - Continue Bivalirudin, goal PTT 60-80  -  Monitor CBC daily.   - Trend LDH      ID:  # Septic shock - improved.  - Cultures pending  - On broad spectrum antibiotics (Vanc/Zosyn)  - CT chest showed diffuse interstitial and groundglass opacities throughout the lungs  consistent with COVID-19 pneumonia.     #LVAD driveline exudate  - Cultures grew Cutibacterium (Propionibacterium) acnes   - Received antibiotic course Zosyn (12/5 - 12/11)    # COVID 19  - Tested + 11/30.   - Completed Dexamethasone and  remdesivir.     Endocrine:  # History of DM  - No meds PTA. Last A1c 5.9%.  - High dose sliding scale insulin  - Continue Lantus 30 units at bed time.     GI/Nutrition:  # Nutrition  - Tube feeds at goal  - Nutrition consulted, appreciate recs     # Bowel regimen  - PRN suppository  - Scheduled Miralax and Senna     # Mesenteric thrombus  - CT 12/4 revealed small amount of nonocclusive thrombus within the proximal.   - CT 12/15 -> Celiac artery and superior mesenteric arteries are patent. There are no other acute findings in the abdomen or pelvis.  - Continue Bivalirudin, goal PTT 60-80     Endo:  # Low T3  - likely due to inflammatory condition.  - Endo consulted. Appreciate recs.    Diet:   Tube feeds  DVT Prophylaxis: On Bivalirudin gtt  Valentine Catheter: in place, indication: Strict 1-2 Hour I&O  Code Status:   FULL CODE    Patient discussed with staff attending, Dr. Araujo and the note reflects our joint plan. Please do not hesitate to call with questions or concerns.     Kinsey Majano MD    Cardiology Fellow.  Pager: [phone]      I have reviewed today's vital signs, notes, medications, labs and imaging. I have also seen and examined the patient and agree with the findings and plan as outlined above.    Ashley Araujo MD  Section Head - Advanced Heart Failure, Transplantation and Mechanical Circulatory Support  Director - [facility]  Associate Professor of Medicine, [facility]            Subjective     Febrile overnight. Patient is currently off of pressors. His hgb is stable. He continues to be intubated and sedated. He had frequent NSVT overnight.      Physical Exam:     B/P: 126/88, T: 98.7, P: 88, R: 30    Wt Readings from Last 4 Encounters:   12/16/20 91.8 kg (202 lb 6.1 oz)   07/02/20 107.5 kg (237 lb)   03/04/20 108.7 kg (239 lb 11.2 oz)   12/06/19 108.4 kg (239 lb)     Intake/Output Summary (Last 24 hours) at 12/12/2020 1904  Last data  filed at 12/12/2020 1900  Gross per 24 hour   Intake 3176.46 ml   Output 2130 ml   Net 1046.46 ml       General Appearance:  Sedated and intubated.  Respiratory: Intubated and mechanically ventilated, poor air movement over the bases, no crackles  Cardiovascular: LVAD hum. JVP 10 cm H20.  GI: soft, ND, + BS  Skin: warm,  no rash  Ext: warm to touch, trace edema, easily palpable pulses in the bilateral radials  Neuro: sedated, opens eyes to questioning.      Data:     Labs Reviewed on Admission    Troponin   Lab Results   Component Value Date    TROPI 22.609 () 12/06/2020    TROPI 24.782 () 12/06/2020    TROPI 21.830 () 12/06/2020    TROPI 0.471 () 12/04/2020    TROPI 0.475 () 12/04/2020     BMP  Recent Labs   Lab 12/16/20  0349 12/15/20  1536 12/15/20  0424 12/14/20  1347 12/14/20  0340 12/13/20  0344 12/13/20  0344   *  --  142  --  137  --  136   POTASSIUM 3.9 3.7 4.0 4.5 3.8   < > 4.0   CHLORIDE 113*  --  108  --  102  --  101   ASHLEE 8.1*  --  8.0*  --  8.1*  --  8.2*   CO2 29  --  29  --  28  --  26   BUN 28  --  37*  --  43*  --  32*   CR 0.89  --  0.90  --  0.91  --  0.78   *  --  213*  --  271*  --  227*    < > = values in this interval not displayed.     CBC  Recent Labs   Lab 12/16/20  0349 12/15/20  1245 12/15/20  0424 12/14/20  1227   WBC 18.5* 20.9* 26.0* 30.8*   RBC 2.60* 2.51* 1.75* 2.17*   HGB 8.3* 8.3* 5.9* 7.4*   HCT 26.1* 25.0* 18.5* 22.3*    100 106* 103*   MCH 31.9 33.1* 33.7* 34.1*   MCHC 31.8 33.2 31.9 33.2   RDW 18.8* 17.8* 18.0* 17.4*    172 215 262       Hepatic Panel   Lab Results   Component Value Date    AST 44 12/12/2020     Lab Results   Component Value Date    ALT 86 12/12/2020     No results found for: BILICONJ   Lab Results   Component Value Date    BILITOTAL 1.1 12/12/2020     Lab Results   Component Value Date    ALBUMIN 2.4 12/12/2020     Lab Results   Component Value Date    PROTTOTAL 5.8 12/12/2020      Lab Results   Component Value Date     ALKPHOS 97 12/12/2020

## 2020-12-16 NOTE — PROGRESS NOTES
Attempted pressure support. Respiration rate in the 40's. Stopped pressure support after 5 min. Will continue to monitor.

## 2020-12-16 NOTE — PLAN OF CARE
"/61   Pulse 91   Temp 98.7  F (37.1  C) (Axillary)   Resp (!) 36   Ht 1.778 m (5' 10\")   Wt 91.5 kg (201 lb 11.5 oz)   SpO2 96%   BMI 28.94 kg/m         Neuro: Sedated. When precedex paused this am, pt squeezed BUE, and withdrew LLE. However, got very tachypneic and tachycardic up to 130s, per team, switch to precedex to make pt comfortable. Precedex @0.4-0.6gtt. Currently, grimaces to pain, squeezed on BUE. PERRL.      Cardiac: MAP>65 w/vaso off all day.  LVAD alarming all day, VAD coordinator and team aware. P.F:2.1-2.4, P.I: 3.5-4.3, P:P:3.8, PS:5500. Hematocrit set @20 per VAD coordinator. Temp on VAD:47.      Respiratory: Vent: FIO2:40%, R:15, PEEP:5, TV:500. PS:7/5 for ~[id number]  this am.  Unable to PS this evening d/t rate in 30s this afternoon.      GI/: Rectal tube with small output. Valentine with good UOP. Lasix adm x1 this am. TF@goal., residuals in low 100s.     Plan: CT head, abd, chest done this afternoon, switched to midline for access for central line holiday. K replaced this afternoon/ Will continue to monitor and update team with changes.   "

## 2020-12-16 NOTE — PLAN OF CARE
Major Shift Events: See previous note. Pressure support for 1.5 hours, stopped due to respiratory rate in the 50's. Precedex increased to 0.7 mcg/kg/min, PRN 2 mg IV Ativan given. Temperature max of 100.3.  Switched back to CMV setting. 80mg of Lasix given in am and 40 mg of Lasix given in afternoon. Urine output 1 liter. Hypotension around 1700, 250 mL bolus of normal saline given. Right radial arterial line removed. Left arterial line placed by Cards resident. NSR with PVC's. Potassium replaced. Rectal tube removed.   Plan: Pressure support and monitor respiratory rate.   For vital signs and complete assessments, please see documentation flowsheets.

## 2020-12-16 NOTE — CONSULTS
Health Psychology                   Clinic    Department of Medicine  Donna Álvarez, Ph.D., L.P. [phone]                        [facility]  [facility] Lilo Mccallum, Ph.D., L.P. [phone]                 3rd Floor  [place] [address]   Lorena Nichols, Ph.D.  [phone]     [address] Evelyn Yost, Ph.D.,  L.P. [phone]      [place]   [address]  [place] [address]           Alo Muller, Ph.D. [phone]      Kalli Chery, Ph.D., L.P. [phone]    Kyle Oneill, Ph.D., A.B.P.P., L.P. [phone]     Roshni Pollard, Ph.D., L.P. [phone]     Inpatient Health Psychology Consultation      Date of Service:  12/16/20    Per EMR: Mr. Chu is a 65 year old male admitted on 12/3/2020 with COVID pneumonia. He has a history of NICM (LVEF at dong of 15%) s/p ICD (4/2017), who underwent HM3 LVAD implant on 12/5/18 as BTT. Admitted as transfer to MICU from [place] ED for AHRF 2/2 COVID pneumonia. 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 Over the course of hospitalization, his respiratory status progressively deteriorated and he was ultimately transferred back to the MICU and emergently intubated. He then developed an elevated troponin (peak 24.782) without ischemic EKG changes, concerning for COVID myocarditis>ACS/coronary embolism. He then developed progressive decline of LVAD flow and was found to have sub-total occlusion of the LVAD outflow graft on repeat CTA. Started on dobutamine and maintaining adequate hemodynamics. Repeat TTE shows aortic valve opening with each beat. Given CXR findings, increasing FiO2, and worsening paO2, there is concern for worsening of his COVID pneumonia/ARDS.    Health Psychology initially consulted to support patient in coping with acute health issues during hospitalization. We were unable to meet with him before he was  intubated.  Due to health status he is not able to participate in psychotherapy so we are closing the consult for now.    Please re-consult as he is able to participate in assessment and support, if he is interested.     Yohana Prajapati, PhD,   Health Psychology Fellow

## 2020-12-16 NOTE — PROGRESS NOTES
Summary:  65 year old with NICM s/p LVAD admitted with COVID ARDS c/b LVAD outflow clot and septic versus cardiogenic shock. Admitted 12/3. Intubated 12/4. Now on on normal PEEP and FiO2, off flolan since 12/13. PST well on 12/15 but other systemic issues ongiong.     S: No issues with oxygenation or ventilation today. Overnight required pressors for presumed septic shock and low hemoglobin. Pan CT today.     O: Tmax 101.2. MAPs mostly 60-70s. Sating 90s. Pulse in 80s.     ABG this AM 7.43/48  Net neg +1.5L yesterday.  On dobutamine at 5. And +/- vaso. Off flolan.     PHYSICAL EXAMINATION:  General: Sedated, did not open eyes to my voice today  HEENT: ETT and OG in place  Pulm/Resp: Clear breath sounds bilaterally, breathing non-labored, overbreathing the vent  CV: RRR murmur from LVAD A-line showing pulsatile flow  Abdomen: Soft, non-distended, non-tender.  : brown catheter in place.   Ext: Cool, without significant edema, + radial pulses present  Incisions/Skin: 2 second capillary refill, clean bandage newly chnages over swan with bloody chux underneath.      Labs and imaging reviewed with staff.     Assessment/Plan:    # Hypoxic respiratory failure 2/2 covid ARDS, improved  # LVAD with outflow track clot, pulsatile flow on ABG, EF <20  # RV dysfunction and cardiogenic shock  Improvement in consolidations previously seen on CT. Increased clot burned within the LVAD. Hypoxia is resolved.     On 12/15 having other issues with sepsis and dropping hemoglobin. Continue to wean but will cautious with extubation until he's more stable.     Oxygenation:  - Continues to do well on PSTs  - Only on 0.5 of dex.   - Recommend T-piece (0/0) PST for 20-30 min when otherwise ready to extubate in this patient with severely reduced EF to see if he tolerates the increased afterload    Patient discussed with Dr. Skelton.     Kely Joseph MD  Pulmonary and Critical Care Fellow  [phone]

## 2020-12-16 NOTE — PROGRESS NOTES
Summary:  65 year old with NICM s/p LVAD admitted with COVID ARDS c/b LVAD outflow clot and septic versus cardiogenic shock. Admitted 12/3. Intubated 12/4. Now on on normal PEEP and FiO2, off flolan since 12/13. PST well on 12/15 but had other systemic issues on going, which now seem to be stable/improving.     S: No issues with oxygenation or ventilation today. Reportedly tachycardic and tachypneic when weaning sedations.     O: Tmax 101.7. MAPs mostly 60-70s. Sating 90s. Pulse in 80s.     VBG this AM 7.46/46/x/x  Net neg +600L yesterday.  On dobutamine at 5. And +/- vaso. Off flolan.     PHYSICAL EXAMINATION:  General: Sedated, did not open eyes to my voice today  HEENT: ETT and OG in place  Pulm/Resp: Clear breath sounds bilaterally, belly breathing, overbreathing the vent  CV: RRR murmur from LVAD A-line showing pulsatile flow  Abdomen: Soft, non-distended, non-tender.  : brown catheter in place.   Ext: Cool, without significant edema, + radial pulses present  Incisions/Skin: 2 second capillary refill, clean bandage newly chnages over swan with bloody chux underneath.      Labs and imaging reviewed with staff.     Assessment/Plan:    # Hypoxic respiratory failure 2/2 covid ARDS, improved  # LVAD with outflow track clot, pulsatile flow on ABG, EF <20  # RV dysfunction and cardiogenic shock  Improved consolidations previously seen on CT. Increased clot burned within the LVAD. Hypoxia is resolved.     Optimize sedation/comfort while weaning.   - OK to extubate even on max precedex.   - Increase precedex and decrease other sedating meds as able (discontinue ativan today)    Vent wean:  - Continues PSTs  - Recommend T-piece (0/0) PST for 20-30 min when otherwise ready to extubate in this patient with severely reduced EF to see if he tolerates the increased afterload  - Optimize Ca, Mag, Phos levels to aid in recovery    Patient discussed with Dr. Skelton.     Kely Joseph MD  Pulmonary and Critical Care  [facility]  [phone]

## 2020-12-16 NOTE — PLAN OF CARE
ETT needs to be advanced 3cm, called RT to advance, then repeat Xray after, will update oncoming RN.

## 2020-12-16 NOTE — PLAN OF CARE
Major Shift Events:  On precedex overnight. Pt seeming uncomfortable, dex turned to 0.6 and scheduled meds given. VAD Flow and PI still low. Pt having a lot of ectopy this AM and febrile at 38.7.  Electrolytes replaced, tylenol given, and ice packs placed.  Vent settings unchanged. Valentine with good Urine output. TF goal 65.  Plan: Continue to monitor and notify MD of changes  For vital signs and complete assessments, please see documentation flowsheets.

## 2020-12-17 NOTE — PLAN OF CARE
Major Shift Events: Sedated on Precedex. Precedex increased due to tachycardia and respiratory rate sustaining in the 50s. ABG completed. PRN Ativan given x1 with little response. Blood pressure stable overnight. T. Max 101.4. Tylenol given x1. Low flow on VAD. See flowsheets for details. CMV settings 40%/400/15/5. Overbreathing the vent, 30-50s. OG in place with tube feeds at goal. Valentine in place with adequate urine output. See flowsheets for details. Potassium and phosphorus replaced per protocol.     Plan: Continue to monitor and notify team of any changes.    For vital signs and complete assessments, please see documentation flowsheets.

## 2020-12-17 NOTE — PHARMACY-VANCOMYCIN DOSING SERVICE
Pharmacy Vancomycin Note  Date of Service 2020  Patient's  1955   65 year old, male    Indication: Sepsis  Goal Trough Level: 15-20 mg/L  Day of Therapy: 3  Current Vancomycin regimen:  1500 mg IV q12h    Current estimated CrCl = Estimated Creatinine Clearance: 95.5 mL/min (based on SCr of 0.89 mg/dL).    Creatinine for last 3 days  2020:  3:40 AM Creatinine 0.91 mg/dL  12/15/2020:  4:24 AM Creatinine 0.90 mg/dL  2020:  3:49 AM Creatinine 0.89 mg/dL    Recent Vancomycin Levels (past 3 days)  2020:  9:01 PM Vancomycin Level 16.9 mg/L    Vancomycin IV Administrations (past 72 hours)                   vancomycin 1500 mg in 0.9% NaCl 250 ml intermittent infusion 1,500 mg (mg) 1,500 mg Given 205     1,500 mg Given  0944     1,500 mg Given 12/15/20 2210     1,500 mg Given  1014    vancomycin (VANCOCIN) 2,250 mg in sodium chloride 0.9 % 250 mL intermittent infusion (mg) 2,250 mg New Bag 207                Nephrotoxins and other renal medications (From now, onward)    Start     Dose/Rate Route Frequency Ordered Stop    12/15/20 0800  vancomycin 1500 mg in 0.9% NaCl 250 ml intermittent infusion 1,500 mg      1,500 mg  over 90 Minutes Intravenous EVERY 12 HOURS 20 1910      20 1900  piperacillin-tazobactam (ZOSYN) 4.5 g vial to attach to  mL bag      4.5 g  over 30 Minutes Intravenous EVERY 6 HOURS 20 1857      20 0500  DOBUTamine (DOBUTREX) 1,000 mg in D5W 250 mL infusion (adult max conc)      5 mcg/kg/min × 103.2 kg (Dosing Weight)  7.7 mL/hr  Intravenous CONTINUOUS 20 0434               Contrast Orders - past 72 hours (72h ago, onward)    Start     Dose/Rate Route Frequency Ordered Stop    12/15/20 1200  iopamidol (ISOVUE-370) solution 123 mL      123 mL Intravenous ONCE 12/15/20 1148 12/15/20 1209          Interpretation of levels and current regimen:  Trough level is  Therapeutic    Has serum creatinine changed > 50% in last 72  hours: No    Urine output:  good urine output    Renal Function: Stable    Plan:  1.  Continue Current Dose  2.  Pharmacy will check trough levels as appropriate in 1-3 Days.    3. Serum creatinine levels will be ordered daily for the first week of therapy and at least twice weekly for subsequent weeks.      Sharif Mcbride, PharmD, BCPS        .

## 2020-12-17 NOTE — PROCEDURES
[facility]     Triple Lumen PICC Placement    Date/Time: 12/17/2020 5:04 PM  Performed by: Michelle Montelongo RN  Authorized by: Bennett Cleary MD   Indications: vascular access    UNIVERSAL PROTOCOL   Site Marked: Yes  Prior Images Obtained and Reviewed:  Yes  Required items: Required blood products, implants, devices and special equipment available    Patient identity confirmed:  Verbally with patient  NA - No sedation, light sedation, or local anesthesia  Confirmation Checklist:  Patient's identity using two indicators, relevant allergies, procedure was appropriate and matched the consent or emergent situation and correct equipment/implants were available  Time out: Immediately prior to the procedure a time out was called    Universal Protocol: the [facility] Universal Protocol was followed    Preparation: Patient was prepped and draped in usual sterile fashion           ANESTHESIA    Anesthesia: See MAR for details  Local Anesthetic:  Lidocaine 1% without epinephrine  Anesthetic Total (mL):  5      SEDATION    Patient Sedated: No        Preparation: skin prepped with ChloraPrep  Skin prep agent: skin prep agent completely dried prior to procedure  Sterile barriers: maximum sterile barriers were used: cap, mask, sterile gown, sterile gloves, and large sterile sheet  Hand hygiene: hand hygiene performed prior to central venous catheter insertion  Type of line used: Power PICC  Catheter type: triple lumen  Lumen type: non-valved  Catheter size: 5 Fr  Brand: Bard  Lot number: [id number]  Placement method: venipuncture, MST, ultrasound and tip confirmation system  Number of attempts: 2  Successful placement: yes  Orientation: right  Location: basilic vein (vein diameter- 0.52cm)  Site rationale: failed on left  Arm circumference: adults 10 cm  Extremity circumference: 37  Visible catheter length: 1  Total catheter length: 46  Dressing and securement: blood cleaned  with CHG, chlorhexidine disc applied, site cleaned, statlock and sterile dressing applied  Post procedure assessment: blood return through all ports, free fluid flow and placement verified by x-ray  PROCEDURE   Patient Tolerance:  Patient tolerated the procedure well with no immediate complications  Describe Procedure: Rewired midline for PICC on left arm but failed, catheter is not threading past subclavian vein. Successful on right.

## 2020-12-17 NOTE — PROCEDURES
Procedure Note     PROCEDURES PERFORMED:   1. Left radial arterial line placement        PHYSICIANS:  1. Cardiology Fellow: Kinsey Majano MD     PRE-PROCEDURE DIAGNOSIS:       Cardiogenic shock     POST-PROCEDURE DIAGNOSIS:  Septic shock     Sterile prep and procedure.     INDICATION:  Hemodynamic monitoring and administration of vasoactive drugs         DESCRIPTION:  1. Location: Left radial artery  2. Access: Local anesthetic with lidocaine.  A micropuncture (21 g) needle with ultrasound guidance was used to establish vascular access using a modified Seldinger technique.  3. Sheath/catheters: 5F  4. Estimated blood loss of <5 mL.     MEDICATIONS:  1. Local anesthesia with lidocaine.      COMPLICATIONS:  1. None     SUMMARY:   1. Successful placement of left radial line.      Kinsey Majano MD  Cardiology Fellow

## 2020-12-17 NOTE — PROCEDURES
INDICATION: Hemodynamic Monitoring  PROCEDURE : Kinsey Majano MD  Ultrasound Used: Y  Site: Right femoral Vein     CONSENT: Not obtained due to emergent nature of the status of the patient.     PROCEDURE SUMMARY:   The  RIGHT inguinal region was prepped using chlorhexidine scrub and draped in sterile fashion using a full drape and sterile probe cover employed. The femoral pulse was identified. Palpating the femoral pulse throughout the procedure, the introducer needle was inserted medial to the femoral artery, inferior to the inguinal crease and into the femoral vein. Venous blood was withdrawn. The syringe was removed and a guidewire was advanced into the introducer needle. The introducer needle was exchanged for a dilator over the guidewire. After appropriate dilation was obtained, the dilator was exchanged over the wire for a _ central venous catheter. The wire was removed and the catheter was sutured in place. A sterile sorbaview shield was placed over the catheter at the insertion site. The patient tolerated the procedure without any hemodynamic compromise. At time of procedure completion, all ports aspirated and flushed properly.  Estimated blood loss is <5 ml.      Kinsey Majano MD   Cardiology fellow  Pager: [phone]

## 2020-12-17 NOTE — PROGRESS NOTES
Event Note:    Patient went into SVT this afternoon. He became hemodynamically unstable with MAP of low 50s. Patient was given 250 ml bolus of fluids. NE and Vaso was started. Dobutamine was decreased due to the arhythmia. His rhythm seemed to be atrial tachycardia at 180s. Adenosine 6 mg and 12 mg were given with minimal improvement of his HR. Patient also received 1 syncronized  Cardioversion at 200 Joules without improvement in his rhythm. He was given 150 mg of IV Amiodarone over 1 hour with improvement of his underlying rhythm to Sinus tachycardia. Central line was placed in Right femoral vein.    Plan:  - STAT CT head/chest/abd/pelvis  - STAT ABG/Lactic acid/CBC  - Start Amiodarone at 0.5 mg/min.  - Continue Levo and Julio César. MAP Goal >60.

## 2020-12-17 NOTE — PROGRESS NOTES
.           Cards 2 Progress Note       Danielito Chu MRN: [id number]  Age: 65 year old, : 1955        Reason for admissoin:      COVID-19 Pneumonia        Assessment and Recommendation:     65 year old male admitted on 12/3/2020 with COVID pneumonia. He has a history of NICM (LVEF at dong of 15%) s/p ICD (2017), who underwent HM3 LVAD implant on 18 as BTT. He was admitted as transfer to MICU from [place] ED for AHRF 2/2 COVID pneumonia. 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 Over the course of hospitalization, his respiratory status progressively deteriorated and he was ultimately transferred back to the MICU and emergently intubated. He then developed an elevated troponin (peak 24.782) without ischemic EKG changes, concerning for COVID myocarditis>ACS/coronary embolism. He then developed progressive decline of LVAD flow and was found to have sub-total occlusion of the LVAD outflow graft on repeat CTA. Started on dobutamine and maintaining adequate hemodynamics. Repeat TTE shows aortic valve opening with each beat. Given CXR findings, increasing FiO2, and worsening paO2, there is concern for worsening of his COVID pneumonia/ARDS.     Today's Plan:  - Continue Dobutamine to 5 mcg/kg/min.  - Continue PS and work on possible extubation  - Volume: Goal net even to -500.  - Pan Culture.  - Continue broad spectrum antibiotics.     Neuro:  # Anxiety/Agitation/Pain  - No purposeful movements. Not awake or following commands.  - Analgesia: Precedex gtt   - Fentanyl prn.  - Managed by Pulm.    Cardiovascular:   # NICM s/p LVAD 18  # Myocarditis  # RV Dysfunction  -  [place] (): CVP: 6; PA:  (17) W: 12; CO/CI: 4.4/2.1; SVR: 1145; SVO2: 51  -  [place] (): CVP: 6; PA:  (17) W: 10; CO/CI: 4.5/2.1; SVR: 1208; SVO2: 44  - (12/15): CVP: 9; CO/CI: 4.9/2.3; SVO2:42  -  Continue Dobutamine to 5 mcg/kg/min  -  Fluid Balance: Net even to neg  500.   - Telemetry monitoring for ventricular dysrhythmias  -  Will give 40 mg of IV lasix BID today.   - Trend hemodynamics.    # LVAD outflow thrombus  - He continues to have low flow alarms.  - Most recent CTA showed thrombus occlusion of the outflow graft.  - Continue Aspirin 162 mg BID, Dipyridamole 25mg TID  - Continue Bivalirudin, goal PTT 60-80  - Monitor CBC daily.   - Trend LDH     #Hx of Atrial tachycardia  #NSVT  - Now stable.    # Septic shock - improved  - Not on pressors at this time.  - Cultures pending  - On broad spectrum antibiotics (Vanc/Zosyn) (12/14 - )  - CT chest showed diffuse interstitial and groundglass opacities throughout the lungs  consistent with COVID-19 pneumonia.     Pulmonary:  # Acute hypoxemic respiratory failure 2/2 Severe COVID-19 infection c/b severe ARDS  - Finished a course of steroids and remdesivir.  - Goal to keep PEEP to minimal amount to decrease RV stress   - Dexamethasone (12/3-12/12)  - Trial of PS to see if patient is able to be extubated.   - Appreciate Pulmonary team with vent management.    Hematology:   # Acute blood loss Anemia  - Likely slow bleed from the swan site for couple of days.  - Hgb now stable.  - Received 2 units of pRBC on 12/15/20.  - Republic was removed. Will continue to montior Hgb.  - CT scan showed no concerns for bleed.    # COVID coagulopathy  # LVAD outflow thrombus  - Continue Aspirin 162mg BID, Dipyridamole 25mg TID  - Continue Bivalirudin, goal PTT 60-80  -  Monitor CBC daily.   - Trend LDH      ID:  # Septic shock - improved.  - Cultures pending  - On broad spectrum antibiotics (Vanc/Zosyn)  - CT chest showed diffuse interstitial and groundglass opacities throughout the lungs  consistent with COVID-19 pneumonia.     #LVAD driveline exudate  - Cultures grew Cutibacterium (Propionibacterium) acnes   - On Vanc/Zosyn (12/14-)    # COVID 19  - Tested + 11/30.   - Completed Dexamethasone and remdesivir.     Endocrine:  # History of DM  - No meds  PTA. Last A1c 5.9%.  - High dose sliding scale insulin  - Continue Lantus 36 units at bed time.     GI/Nutrition:  # Nutrition  - Tube feeds at goal  - Nutrition consulted, appreciate recs     # Bowel regimen  - PRN suppository  - Scheduled Miralax and Senna     # Mesenteric thrombus  - resolved.  - CT 12/4 revealed small amount of nonocclusive thrombus within the proximal.   - CT 12/15 -> Celiac artery and superior mesenteric arteries are patent. There are no other acute findings in the abdomen or pelvis.  - Continue Bivalirudin, goal PTT 60-80     Endo:  # Low T3  - Likely due to inflammatory condition.  - Endo consulted. Appreciate recs.    Diet:   Tube feeds  DVT Prophylaxis: On Bivalirudin gtt  Valentine Catheter: in place, indication: Strict 1-2 Hour I&O  Code Status:   FULL CODE    Patient discussed with staff attending, Dr. Araujo and the note reflects our joint plan. Please do not hesitate to call with questions or concerns.     Kinsey Majano MD    Cardiology Fellow.  Pager: [phone]      Late entry - pt seen and examined on 12/17/2020  I have reviewed today's vital signs, notes, medications, labs and imaging. I have also seen and examined the patient and agree with the findings and plan as outlined above.    Ashley Araujo MD  Section Head - Advanced Heart Failure, Transplantation and Mechanical Circulatory Support  Director - Adult [facility]  Associate Professor of Medicine, [facility]          Subjective     Febrile this morning. He received 250 ml of fluids yesterday. He gets agitated, tachycardic, and tachypnic with sedation holidays.       Physical Exam:     B/P: 126/88, T: 98.7, P: 88, R: 30    Wt Readings from Last 4 Encounters:   12/17/20 93 kg (205 lb 0.4 oz)   07/02/20 107.5 kg (237 lb)   03/04/20 108.7 kg (239 lb 11.2 oz)   12/06/19 108.4 kg (239 lb)     Intake/Output Summary (Last 24 hours) at 12/12/2020 3655  Last data filed at  12/12/2020 1900  Gross per 24 hour   Intake 3176.46 ml   Output 2130 ml   Net 1046.46 ml       General Appearance:  Sedated and intubated.  Respiratory: Intubated and mechanically ventilated, poor air movement over the bases, no crackles  Cardiovascular: LVAD hum. JVP 10 cm H20.  GI: soft, ND, + BS  Skin: warm,  no rash  Ext: warm to touch, trace edema, easily palpable pulses in the bilateral radials  Neuro: sedated, opens eyes to questioning.      Data:     Labs Reviewed on Admission    Troponin   Lab Results   Component Value Date    TROPI 22.609 () 12/06/2020    TROPI 24.782 () 12/06/2020    TROPI 21.830 () 12/06/2020    TROPI 0.471 () 12/04/2020    TROPI 0.475 () 12/04/2020     BMP  Recent Labs   Lab 12/17/20  0404 12/16/20  1413 12/16/20  0349 12/15/20  1536 12/15/20  0424 12/14/20  0340 12/14/20  0340     --  146*  --  142  --  137   POTASSIUM 3.6 3.6 3.9 3.7 4.0   < > 3.8   CHLORIDE 113*  --  113*  --  108  --  102   ASHLEE 8.1*  --  8.1*  --  8.0*  --  8.1*   CO2 28  --  29  --  29  --  28   BUN 22  --  28  --  37*  --  43*   CR 0.83  --  0.89  --  0.90  --  0.91   *  --  166*  --  213*  --  271*    < > = values in this interval not displayed.     CBC  Recent Labs   Lab 12/17/20  0404 12/16/20  0349 12/15/20  1245 12/15/20  0424   WBC 15.3* 18.5* 20.9* 26.0*   RBC 2.48* 2.60* 2.51* 1.75*   HGB 8.0* 8.3* 8.3* 5.9*   HCT 25.2* 26.1* 25.0* 18.5*   * 100 100 106*   MCH 32.3 31.9 33.1* 33.7*   MCHC 31.7 31.8 33.2 31.9   RDW 18.8* 18.8* 17.8* 18.0*    177 172 215       Hepatic Panel   Lab Results   Component Value Date    AST 44 12/12/2020     Lab Results   Component Value Date    ALT 86 12/12/2020     No results found for: BILICONJ   Lab Results   Component Value Date    BILITOTAL 1.1 12/12/2020     Lab Results   Component Value Date    ALBUMIN 2.4 12/12/2020     Lab Results   Component Value Date    PROTTOTAL 5.8 12/12/2020      Lab Results   Component Value Date    ALKPHOS 97  12/12/2020

## 2020-12-17 NOTE — PROGRESS NOTES
Care from 0700 - 1130    Major Shift Events:  Pt non-responsive and does not withdraw to pain. Pt very labored, using accessory and abdominal muscles to breathe, RR up to 50's intermittently with  - 120. Noticed ABG was abnormal overnight without treatment. FI02 increased to 50% and re-checked ABG. Pulmonary notified of need to increase sedation. Currently dex increase to 1.2. Orders for prn fentanyl IV. 50mcg of fentanyl given with some effect. Attempted PS trials this AM and failed; RR up to 50. 40mg lasix given with good response.    Plan: Continue sedation until pt ready for PS trials.     For vital signs and complete assessments, please see documentation flowsheets.

## 2020-12-17 NOTE — PROGRESS NOTES
Summary:  65 year old with NICM s/p LVAD admitted with COVID ARDS c/b LVAD outflow clot and septic versus cardiogenic shock. Admitted 12/3. Intubated 12/4. Now on on normal PEEP and FiO2, off flolan since 12/13. PST well on 12/15 but had other systemic issues on going. On 12/17 was increasingly tachycardic and tachypneic then hypotensive. CT CAP done and pending.     S: Continued tachycardia and tachypnei today despite adding PRN fentanyl. Had an episode of SVt to 180s treated with adenosine and then shock. Increasingly hypotensive.      O: Tmax 101.7. MAPs mostly 60-70s. Sating 90s. Pulse in 80s.     VBG this AM 7.46/46/x/x  Net neg +600L yesterday.  On dobutamine at 5. And +/- vaso. Off flolan.     PHYSICAL EXAMINATION:  General: Sedated   HEENT: ETT and OG in place  Pulm/Resp: Tachypnic, paroxysmal abdominal breathing, lung with scattered crackles  CV: RRR murmur from LVAD A-line showing pulsatile flow  Abdomen: Soft, non-distended, non-tender.  : brown catheter in place.   Ext: Cool, without significant edema, + radial pulses present  Incisions/Skin: 2 second capillary refill, clean bandage newly chnages over swan with bloody chux underneath.      Labs and imaging reviewed with staff.     I/O's daily net: +1.3L --> +1L ---> +1.5L ---> +900cc --> -500cc    Assessment/Plan:    # Hypoxic respiratory failure 2/2 covid ARDS, improved  # LVAD with outflow track clot, pulsatile flow on ABG, EF <20  # RV dysfunction and cardiogenic shock  Worsening status today. Not hypoxic or hypercarbic but increasing paroxysmal abdominal breathing. Has been net positive ~1L daily until yesterday so possibly contributing. Difficult to assess volume status so cards replaced line today. CT CAP pending. No clinically ready for extubation or PST today.     Slightly worse interstitial and ground glass opacities on todays CT. Fairly minimal change.     Pulm  - Continue broad spectrum antibiotics in case of a bacterial component  particularly given fever  - Agree with diuresis per cards  - Hypoxia not an issue at this time so no indication for proning/paralytic/flolan as in some covid + cases  - Optimize Ca, Mag, Phos levels to aid in recovery    Sedation (ordered)  - Precedex and fent gtt  - Will need to wean off fent gtt prior to extubation but titrate to comfort for now  - OK to extubate even on max precedex.     Vent wean:  - No PST trials today due to HD instability  Once ready:  - BID PST and sedation holiday  - Recommend T-piece (0/0) PST for 20-30 min when otherwise ready to extubate in this patient with severely reduced EF to see if he tolerates the increased afterload      Patient discussed with Dr. Skelton.     Kely Joseph MD  Pulmonary and Critical Care Fellow  [phone]

## 2020-12-17 NOTE — PROGRESS NOTES
HR up to 220s. Picking up p-wave on monitor. EKG showed sinus tachycardia 110s. EKG patches changed and rate resolved. Cards notified and will continue to monitor without intervention.

## 2020-12-18 NOTE — PLAN OF CARE
Patient transitioned to comfort cares today. Patient's nephews visiting and will be present during passing. Plan is to turn off LVAD, and vaso pressors when family is ready. Plan is to turn Vent to CPAP/PS 20% 5/5. We will not extubate because of positive airflow room. Patient is already on versed, fentanyl, precedex. PRN versed and fentanyl available as well.     Will continue to monitor.     Cabrera Ordonez RN

## 2020-12-18 NOTE — PROGRESS NOTES
Cross cover note:    At around 2300 patient's rhythm back in atrial tachycardia with rates in the 160s and MAPs in the upper 50s. He was not on amiodarone drip, therefore he received a slow 150mg bolus and drip was started. He remained in atrial tachycardia, although rate was better controlled and his MAP improved into the 60-70s. His LVAD flow had been in the low 2's throughout the evening. Labs showed increasing lactic acid and SvO2 25. Estimation of his hemodynamics suggest combination of septic and cardiogenic shock.    At 0600 rate back up to 140-150s that appeared regular as before,c consistent with atrial tachycardia with stable MAP, digoxin load was provided. Then at around 0615 his rate increased to 150-170s with BP 50/20s. Proceeded to perform DCCV. He received one shock of 150J and rhythm briefly converted to sinus rhythm with BP 90/60s. 5 minutes later rhythm was irregular and rate 160s afib with BP 60/30's. He received another shock with 200J and converted to sinus rhythm with frequent PACs, within 5 minutes went back to afib but rate was 90-100s. His BP unfortunately, did not recover significantly and was 70s/50s. Norepi at that time was 0.15mcg/kg/min and Vaso 2.4U/min; Vaso was increased and Phenylephrine was started with aim of improving BP and not provide B1 stimulation that can lead to further rhythm issues.     Plan was discussed and updated with Dr. Araujo.

## 2020-12-18 NOTE — PLAN OF CARE
LVAD turned off at 12:57 per instructions from VAD coordinator. Stopped priscilla, levo, vaso, amio and bival all at this time. Gave additional 2mg versed and 50 mcg fent. Turned vent to PS 21% 5/5. MD, RN, RT and family all present

## 2020-12-18 NOTE — DISCHARGE SUMMARY
[facility]     Cardiology Death Summary- Cards 2      Date of Admission: 12/3/2020  Date of Death: 12/18/2020  Discharging Provider: MEENAKSHI ARANDA      Hospital Course   65 year old male admitted on 12/3/2020 with COVID pneumonia. He has a history of NICM (LVEF at dong of 15%) s/p ICD (4/2017), who underwent HM3 LVAD implant on 12/5/18 as BTT. He was admitted as transfer to MICU from [place] ED for AHRF 2/2 COVID pneumonia. 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 Over the course of hospitalization, his respiratory status progressively deteriorated and he was ultimately transferred back to the MICU and emergently intubated. He then developed an elevated troponin (peak 24.782) without ischemic EKG changes, concerning for COVID myocarditis>ACS/coronary embolism. He then developed progressive decline of LVAD flow and was found to have sub-total occlusion of the LVAD outflow graft on repeat CTA. He was started on dobutamine and maintaining adequate hemodynamics. Repeat TTE showed aortic valve opening with each beat. Given CXR findings, increasing FiO2, and worsening paO2, there was concern for worsening of his COVID pneumonia/ARDS. Over the last two days, patient had hemodynamically unstable rhythm. He required multiple shocks and increased pressor requirements. Family were constantly being updated. Due to patient's worsening clinical condition, family has decided to transition the patient to comfort care.     Cause of death: COVID-19       Kinsey Majano MD  [facility]   ______________________________________________________________________      Significant Results and Procedures   Most Recent 3 CBC's:  Recent Labs   Lab Test 12/18/20  0324 12/17/20  1712 12/17/20  1331 12/17/20  0404   WBC 18.3* 19.1*  --  15.3*   HGB 7.7* 8.0* 8.3* 8.0*   * 104*  --  102*     177  --  160     Most Recent 3 BMP's:  Recent Labs   Lab Test 12/18/20  0324 12/17/20  1712 12/17/20  0404    143 144   POTASSIUM 4.3 3.8 3.6   CHLORIDE 109 112* 113*   CO2 24 25 28   BUN 34* 23 22   CR 1.04 0.83 0.83   ANIONGAP 9 7 3   ASHLEE 7.4* 7.6* 8.1*   * 254* 225*     Most Recent 2 LFT's:  Recent Labs   Lab Test 12/18/20  0324 12/17/20  1712   AST 58* 57*   ALT 56 60   ALKPHOS 85 101   BILITOTAL 1.4* 1.8*     Most Recent 3 Troponin's:  Recent Labs   Lab Test 12/06/20  1412 12/06/20  0808 12/06/20  0353   TROPI 22.609* 24.782* 21.830*     Most Recent 6 Bacteria Isolates From Any Culture (See EPIC Reports for Culture Details):  Recent Labs   Lab Test 12/17/20  1505 12/17/20  1128 12/17/20  1027 12/14/20  2102 12/14/20 2029 12/14/20 2027   CULT Moderate growth  Yeast  *  Culture in progress No growth after 17 hours No growth after 19 hours Moderate growth  Candida albicans / dubliniensis  Candida albicans and Candida dubliniensis are not routinely speciated  Susceptibility testing not routinely done  * No growth after 4 days No growth after 4 days     Most Recent ABG:  Recent Labs   Lab Test 12/18/20  0835 12/17/20  2216 12/17/20  2216   PH 7.42   < > 7.52*   PO2 63*   < > 75*   PCO2 36   < > 33*   HCO3 23   < > 27   BROOKS  --   --  3.7    < > = values in this interval not displayed.       Consultations This Hospital Stay   PHYSICAL THERAPY ADULT IP CONSULT  OCCUPATIONAL THERAPY ADULT IP CONSULT  VASCULAR ACCESS CARE ADULT IP CONSULT  CARDIOLOGY HEART FAILURE (HF) IP CONSULT  PHARMACY TO DOSE WARFARIN  CARDIOTHORACIC SURGERY ADULT IP CONSULT  INFECTIOUS DISEASE TRANSPLANT HSCT/ HEME MALIG ADULT IP CONSULT  INFECTIOUS DISEASE TRANSPLANT HSCT/ HEME MALIG ADULT IP CONSULT  CARDIOTHORACIC SURGERY ADULT IP CONSULT  PSYCHIATRY IP CONSULT  PSYCHOLOGY ADULT IP CONSULT  NEPHROLOGY ICU IP CONSULT  NUTRITION SERVICES ADULT IP CONSULT  PHARMACY TO DOSE VANCO  PHARMACY IP CONSULT  PHARMACY IP  CONSULT  PHARMACY IP CONSULT  PHARMACY IP CONSULT  PHARMACY IP CONSULT  CARE MANAGEMENT / SOCIAL WORK IP CONSULT  ENDOCRINE NON-DIABETES ADULT IP CONSULT  PHARMACY TO DOSE VANCO  VASCULAR ACCESS CARE ADULT IP CONSULT  VASCULAR ACCESS ADULT IP CONSULT  ENDOCRINE NON-DIABETES ADULT IP CONSULT  VASCULAR ACCESS ADULT IP CONSULT  VASCULAR ACCESS FOR PICC PLACEMENT ADULT IP CONSULT  VASCULAR ACCESS FOR PICC PLACEMENT ADULT IP CONSULT    Primary Care Physician   Sapphire Tate    Time Spent on this Encounter   I, Kinsey Majano MD, personally saw the patient today and spent greater than 30 minutes discharging this patient.

## 2020-12-18 NOTE — PROGRESS NOTES
.           Cards 2 Progress Note       Danielito Chu MRN: [id number]  Age: 65 year old, : 1955        Reason for admissoin:      COVID-19 Pneumonia        Assessment and Recommendation:     65 year old male admitted on 12/3/2020 with COVID pneumonia. He has a history of NICM (LVEF at dong of 15%) s/p ICD (2017), who underwent HM3 LVAD implant on 18 as BTT. He was admitted as transfer to MICU from [place] ED for AHRF 2/2 COVID pneumonia. 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 Over the course of hospitalization, his respiratory status progressively deteriorated and he was ultimately transferred back to the MICU and emergently intubated. He then developed an elevated troponin (peak 24.782) without ischemic EKG changes, concerning for COVID myocarditis>ACS/coronary embolism. He then developed progressive decline of LVAD flow and was found to have sub-total occlusion of the LVAD outflow graft on repeat CTA. Started on dobutamine and maintaining adequate hemodynamics. Repeat TTE shows aortic valve opening with each beat. Given CXR findings, increasing FiO2, and worsening paO2, there is concern for worsening of his COVID pneumonia/ARDS.     Today's Plan:  - We had extensive discussion with the family today and they agreed to go comfort care.     Neuro:  # Anxiety/Agitation/Pain  - No purposeful movements. Not awake or following commands.  - Analgesia: Precedex gtt   - Fentanyl prn.  - Managed by Pulm.    Cardiovascular:   # NICM s/p LVAD 18  # Myocarditis  # RV Dysfunction  -  [place] (): CVP: 6; PA:  () W: 12; CO/CI: 4.4/2.1; SVR: 1145; SVO2: 51  -  [place] (): CVP: 6; PA:  () W: 10; CO/CI: 4.5/2.1; SVR: 1208; SVO2: 44  - (12/15): CVP: 9; CO/CI: 4.9/2.3; SVO2:42  -  Continue Dobutamine to 5 mcg/kg/min  -  Fluid Balance: Net even to neg 500.   - Telemetry monitoring for ventricular dysrhythmias  -  Will give 40 mg of IV  lasix BID today.   - Trend hemodynamics.    # LVAD outflow thrombus  - He continues to have low flow alarms.  - Most recent CTA showed thrombus occlusion of the outflow graft.  - Continue Aspirin 162 mg BID, Dipyridamole 25mg TID  - Continue Bivalirudin, goal PTT 60-80  - Monitor CBC daily.   - Trend LDH     #Hx of Atrial tachycardia  #NSVT  - Now stable.    # Septic shock - improved  - Not on pressors at this time.  - Cultures pending  - On broad spectrum antibiotics (Vanc/Zosyn) (12/14 - )  - CT chest showed diffuse interstitial and groundglass opacities throughout the lungs  consistent with COVID-19 pneumonia.     Pulmonary:  # Acute hypoxemic respiratory failure 2/2 Severe COVID-19 infection c/b severe ARDS  - Finished a course of steroids and remdesivir.  - Goal to keep PEEP to minimal amount to decrease RV stress   - Dexamethasone (12/3-12/12)  - Trial of PS to see if patient is able to be extubated.   - Appreciate Pulmonary team with vent management.    Hematology:   # Acute blood loss Anemia  - Likely slow bleed from the swan site for couple of days.  - Hgb now stable.  - Received 2 units of pRBC on 12/15/20.  - [place] was removed. Will continue to montior Hgb.  - CT scan showed no concerns for bleed.    # COVID coagulopathy  # LVAD outflow thrombus  - Continue Aspirin 162mg BID, Dipyridamole 25mg TID  - Continue Bivalirudin, goal PTT 60-80  -  Monitor CBC daily.   - Trend LDH      ID:  # Septic shock - improved.  - Cultures pending  - On broad spectrum antibiotics (Vanc/Zosyn)  - CT chest showed diffuse interstitial and groundglass opacities throughout the lungs  consistent with COVID-19 pneumonia.     #LVAD driveline exudate  - Cultures grew Cutibacterium (Propionibacterium) acnes   - On Vanc/Zosyn (12/14-)    # COVID 19  - Tested + 11/30.   - Completed Dexamethasone and remdesivir.     Endocrine:  # History of DM  - No meds PTA. Last A1c 5.9%.  - High dose sliding scale insulin  - Continue Lantus 36 units  at bed time.     GI/Nutrition:  # Nutrition  - Tube feeds at goal  - Nutrition consulted, appreciate recs     # Bowel regimen  - PRN suppository  - Scheduled Miralax and Senna     # Mesenteric thrombus  - resolved.  - CT 12/4 revealed small amount of nonocclusive thrombus within the proximal.   - CT 12/15 -> Celiac artery and superior mesenteric arteries are patent. There are no other acute findings in the abdomen or pelvis.  - Continue Bivalirudin, goal PTT 60-80     Endo:  # Low T3  - Likely due to inflammatory condition.  - Endo consulted. Appreciate recs.    Diet:   Tube feeds  DVT Prophylaxis: On Bivalirudin gtt  Valentine Catheter: in place, indication: Strict 1-2 Hour I&O  Code Status:   DNAR    Patient discussed with staff attending, Dr. Mondragon and the note reflects our joint plan. Please do not hesitate to call with questions or concerns.     Kinsey Majano MD    Cardiology Fellow.  Pager: [phone]        Subjective     SVT overnight s/p 3 DCCV. Hypotension as well, he had to be started on priscilla on top of the levo and vaso he was on.   We had extensive discussion with the family today and they agreed to go comfort care.      Physical Exam:     B/P: 126/88, T: 98.7, P: 88, R: 30    Wt Readings from Last 4 Encounters:   12/18/20 90.8 kg (200 lb 2.8 oz)   07/02/20 107.5 kg (237 lb)   03/04/20 108.7 kg (239 lb 11.2 oz)   12/06/19 108.4 kg (239 lb)     Intake/Output Summary (Last 24 hours) at 12/12/2020 1904  Last data filed at 12/12/2020 1900  Gross per 24 hour   Intake 3176.46 ml   Output 2130 ml   Net 1046.46 ml       General Appearance:  Sedated and intubated.  Respiratory: Intubated and mechanically ventilated, poor air movement over the bases, no crackles  Cardiovascular: LVAD hum. JVP 10 cm H20.  GI: soft, ND, + BS  Skin: warm,  no rash  Ext: warm to touch, trace edema, easily palpable pulses in the bilateral radials  Neuro: sedated, opens eyes to questioning.      Data:     Labs Reviewed on  Admission    Troponin   Lab Results   Component Value Date    TROPI 22.609 () 12/06/2020    TROPI 24.782 () 12/06/2020    TROPI 21.830 () 12/06/2020    TROPI 0.471 () 12/04/2020    TROPI 0.475 () 12/04/2020     BMP  Recent Labs   Lab 12/18/20  0324 12/17/20  1712 12/17/20  0404 12/16/20  1413 12/16/20  0349    143 144  --  146*   POTASSIUM 4.3 3.8 3.6 3.6 3.9   CHLORIDE 109 112* 113*  --  113*   ASHLEE 7.4* 7.6* 8.1*  --  8.1*   CO2 24 25 28  --  29   BUN 34* 23 22  --  28   CR 1.04 0.83 0.83  --  0.89   * 254* 225*  --  166*     CBC  Recent Labs   Lab 12/18/20  0324 12/17/20  1712 12/17/20  1331 12/17/20  0404 12/16/20  0349   WBC 18.3* 19.1*  --  15.3* 18.5*   RBC 2.42* 2.47*  --  2.48* 2.60*   HGB 7.7* 8.0* 8.3* 8.0* 8.3*   HCT 24.9* 25.6*  --  25.2* 26.1*   * 104*  --  102* 100   MCH 31.8 32.4  --  32.3 31.9   MCHC 30.9* 31.3*  --  31.7 31.8   RDW 18.7* 18.8*  --  18.8* 18.8*    177  --  160 177       Hepatic Panel   Lab Results   Component Value Date    AST 44 12/12/2020     Lab Results   Component Value Date    ALT 86 12/12/2020     No results found for: BILICONJ   Lab Results   Component Value Date    BILITOTAL 1.1 12/12/2020     Lab Results   Component Value Date    ALBUMIN 2.4 12/12/2020     Lab Results   Component Value Date    PROTTOTAL 5.8 12/12/2020      Lab Results   Component Value Date    ALKPHOS 97 12/12/2020

## 2020-12-18 NOTE — PROGRESS NOTES
Major Shift Events:  Episode of low blood pressure and map. HR in the upper 160-180's. Resp 40-50's. MD called. Pressors started. MAP goal >60. Adenosine given. Cardioversion followed with no rhythm change. Amiodarone and saline bolus. Down to CT for chest/pelvic/head. PICC placed in right arm. Placed central line, right femoral vein. Sputum sent.   Plan: Continue to closely monitor.  For vital signs and complete assessments, please see documentation flowsheets.

## 2020-12-18 NOTE — PLAN OF CARE
Major Shift Events:      Neuro: Patient does not follow commands. Does not open eye spontaneously or to voice.     Cards: Patient is noted to have Flow at 2 or less than 2. Cards Sx notified and several interventions including administering of Amiodarone bolus and increasing infusion rate to 1 mg/min, has not been too effective on this patient.Other interventions in place.    Cardioverted x 3 at 200J with no change in patient's rhythm.    Pulm: Patient's ventilator FiO2 was increased to 70%. Patient's oxygen saturation was decreasing to unacceptable values. As such, vent settings were changed. Patient continues to overbreathe the vent, using abdominal and accessory muscle to breathe.    Gi/: Valentine, patient is making marginal urine. No fecal  however, patient did not have BM this shift.    Skin: No changes.    Access: PICC x 1; Arterial line, PICC line, Femoral liine, PIVs x 3.    Endo: BG monitoring. Patient received two administrations of insulin.     ID: Tmax = 100.7F, Patient received Tylenol for mild fever.    Heme: Monitoring blood count.     Plan: Monitor SVO2, LVAD, rhythm status. Continue to monitor and report any changes to MD.    For vital signs and complete assessments, please see documentation flowsheets.

## 2020-12-18 NOTE — DISCHARGE SUMMARY
MD DEATH PRONOUNCEMENT    Called to pronounce Danielito Chu dead.    Physical Exam: Heart sounds absent    Patient was pronounced dead at 13:00 PM, 2020.    Preliminary Cause of Death: COVID-19    Active Problems:    COVID-19       Infectious disease present?: YES    Communicable disease present? (examples: HIV, chicken pox, TB, Ebola, CJD) :  YES    Multi-drug resistant organism present? (example: MRSA): NO    Please consider an autopsy if any of the following exist:  NO Unexpected or unexplained death during or following any dental, medical, or surgical diagnostic treatment procedures.   NO Death of mother at or up to seven days after delivery.     NO All  and pediatric deaths.     NO Death where the cause is sufficiently obscure to delay completion of the death certificate.   NO Deaths in which autopsy would confirm a suspected illness/condition that would affect surviving family members or recipients of transplanted organs.     The following deaths must be reported to the 's Office:  NO A death that may be due entirely or in part to any factors other than natural disease (recent surgery, recent trauma, suspected abuse/neglect).   NO A death that may be an accident, suicide, or homicide.     NO Any sudden, unexpected death in which there is no prior history of significant heart disease or any other condition associated with sudden death.   NO A death under suspicious, unusual, or unexpected circumstances.    NO Any death which is apparently due to natural causes but in which the  does not have a personal physician familiar with the patient s medical history, social, or environmental situation or the circumstances of the terminal event.   NO Any death apparently due to Sudden Infant Death Syndrome.     NO Deaths that occur during, in association with, or as consequences of a diagnostic, therapeutic, or anesthetic procedure.   NO Any death in which a fracture of a major  bone has occurred within the past (6) six months.   NO A death of persons note seen by their physician within 120 days of demise.     NO Any death in which the  was an inmate of a public institution or was in the custody of Law Enforcement personnel.   NO  All unexpected deaths of children   NO Solid organ donors   NO Unidentified bodies   NO Deaths of persons whose bodies are to be cremated or otherwise disposed of so that the bodies will later be unavailable for examination;   NO Deaths unattended by a physician outside of a licensed healthcare facility or licensed residential hospice program   NO Deaths occurring within 24 hours of arrival to a health care facility if death is unexpected.    NO Deaths associated with the decedent s employment.   NO Deaths attributed to acts of terrorism.   NO Any death in which there is uncertainty as to whether it is a medical examiner s care should be discussed with the medical investigator.        Body disposition: Body released to the  home.

## 2020-12-18 NOTE — PROGRESS NOTES
Inactivating from [facility].  Patient passed away today.  Jeanette Bryant RN

## 2020-12-18 NOTE — PLAN OF CARE
Patient passed away this afternoon at 1pm with family and providers present at bedside. Patient belongings sent home with family. Patient's owned LVAD equipment was cleaned and will be picked up by VAD coordinator. Patient brought from unit by security this afternoon. Plan to coordinate with [facility] home per family request.     Please see other documentation and flowsheets for specifics.    Cabrera Ordonez RN

## 2020-12-19 LAB
BACTERIA SPEC CULT: ABNORMAL
SPECIMEN SOURCE: ABNORMAL

## 2020-12-20 LAB
BACTERIA SPEC CULT: NO GROWTH
SPECIMEN SOURCE: NORMAL

## 2020-12-21 ENCOUNTER — DOCUMENTATION ONLY (OUTPATIENT)
Dept: CARDIOLOGY | Facility: CLINIC | Age: 65
End: 2020-12-21

## 2020-12-21 NOTE — PROGRESS NOTES
Notification e-mail/call of patient's death went out to HIM, LVAD Coordinator, Cassi Rodriges/Moisés and Research Coordinators.    Notification e-mail of patient's death went out to cardiologist Dr. Lorena Watkins along with information of Patient's referring cardiologist.    [facility] 12/18/2020    E-mail was sent out on 12/21/2020

## 2020-12-23 LAB
BACTERIA SPEC CULT: NO GROWTH
BACTERIA SPEC CULT: NO GROWTH
Lab: NORMAL
Lab: NORMAL
SPECIMEN SOURCE: NORMAL
SPECIMEN SOURCE: NORMAL

## 2021-09-07 NOTE — PROGRESS NOTES
.           Cards 2 Progress Note                                                               2020  Danielito Chu MRN: [id number]  Age: 65 year old, : 1955        Reason for admissoin:      COVID-19 Pneumonia        Assessment and Recommendation:     65 year old male admitted on 12/3/2020 with COVID pneumonia. He has a history of NICM (LVEF at dong of 15%) s/p ICD (2017), who underwent HM3 LVAD implant on 18 as BTT. He was admitted as transfer to MICU from [place] ED for AHRF 2/2 COVID pneumonia. 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 Over the course of hospitalization, his respiratory status progressively deteriorated and he was ultimately transferred back to the MICU and emergently intubated. He then developed an elevated troponin (peak 24.782) without ischemic EKG changes, concerning for COVID myocarditis>ACS/coronary embolism. He then developed progressive decline of LVAD flow and was found to have sub-total occlusion of the LVAD outflow graft on repeat CTA. Started on dobutamine and maintaining adequate hemodynamics. Repeat TTE shows aortic valve opening with each beat. Given CXR findings, increasing FiO2, and worsening paO2, there is concern for worsening of his COVID pneumonia/ARDS.     Today's Plan:  - Continue Dobutamine to 5 mcg/kg/min  - Schedule Lasix 20 mg IV BID  - PS trial and possible extubation  - Titrate down pain meds.     Neuro:  # Pain and sedation  - Analgesia: Fentanyl gtt + PRN pushes  - Decrease Oxycodone to 5 mg q4h  - Decrease Ativan to 2  mg q6h  - Sedation holiday    Cardiovascular:   # NICM s/p LVAD 18  # Myocarditis  # RV Dysfunction  # Cardiogenic Shock  - [place] (): CVP: 6; PA:  (17) W: 12; CO/CI: 4.4/2.1; SVR: 1145; SVO2: 51  - Continue Dobutamine to 5 mcg/kg/min  - Maintain CVP 8-10 and even fluid balance  - Telemetry monitoring for ventricular dysrrythmias  - Start lasix 20 mg  BID  - Trend hemodynamics q6h    # LVAD outflow thrombus  - He had a few low flow alarms overnight .  - Most recent CTA showed worsened thrombus occlusion of the outflow graft.  - Continue Aspirin 162 mg BID, Dipyridamole 25mg TID  - Continue Bivalirudin, goal PTT 60-80  - Monitor CBC daily.   - Trend LDH     #Hx of Atrial tachycardia  - Now stable.    Pulmonary:  # Acute hypoxemic respiratory failure 2/2 Severe COVID-19 infection c/b severe ARDS  - Transfered to [facility] on 12/3 oxymask 6L sating 96% and was transferred to the floor. On 12/4 acutely decompensated requiring mechanical ventilation, intubated. CXR showing bilateral worsening infiltrates. Stopped Remdesivir (12/3-12/5) given transaminitis.   - Goal to keep PEEP to minimal amount to decrease RV stress   - Dexamethasone (12/3-12/12)  - Trial of PS to see if patient is able to be extubated.   - Appreciate Pulmonary consultation with vent management.    Hematology:   # COVID coagulopathy  # LVAD outflow thrombus  - Continue Aspirin 162mg BID, Dipyridamole 25mg TID  - Continue Bivalirudin, goal PTT 60-80  - Monitor CBC daily.   - Trend LDH      ID:  #LVAD driveline exudate  - Cultures grew Cutibacterium (Propionibacterium) acnes   - Received antibiotic course Zosyn (12/5 - 12/11)    # COVID 19  Tested + 11/30. LDH typically ~200. Elevated inflammatory markers with , , fibrinogen 646, d-dimer 1.3, normal WBC.  - Completed Dexamethasone and remdesivir.     Endocrine:  # History of DM  - No meds PTA. Last A1c 5.9%.  - High dose sliding scale insulin  - Start Lantus 20 units at bed time.     GI/Nutrition:  # Nutrition  - Tube feeds at goal  - Nutrition consulted, appreciate recs     # Bowel regimen  - PRN suppository  - Scheduled Miralax and Senna     # Mesenteric thrombus  - CT 12/4 revealed small amount of nonocclusive thrombus within the proximal.   - Continue Bivalirudin, goal PTT 60-80        Diet:   Tube feeds  DVT Prophylaxis: On Bivalirudin  gtt  Valentine Catheter: in place, indication: Strict 1-2 Hour I&O  Code Status:   FULL CODE    Patient discussed with staff attending, Dr. Araujo and the note reflects our joint plan. Please do not hesitate to call with questions or concerns.     Kinsey Majano MD    Cardiology Fellow  Pager: [phone]      have seen and examined the patient on December 12, 2020. I have discussed the care of this patient with the heart failure team and I agree with the assessment and plan as outlined in the note below. I have personally reviewed vital signs, hemodynamics, medications, laboratory values, and all diagnostic testing.      The patient is critically ill and requires ICU status for: Respiratory failure due to COVID-19, LVAD outflow thrombosis, cardiogenic shock     Active problems and management:   1. COVID-19 ARDS  2. Right heart failure  3. LVAD with outflow graft thrombosis  4. Troponin elevation concerning for COVID myocarditis   5. Shock, cardiogenic      65 year-old man with HM3 LVAD as BTT with COVID respiratory distress who is intubated treated with dexamethasone and remdesivir.      He had LVAD flow flow alarms with persistently low flow. His CTA demonstrated worsened thrombus occlusion of the outflow graft. Bivalrudin, ASA 162mg BID, and persantine continued to maximize his antithrombotic response.      PA catheter hemodynamics with normal filling pressures and acceptable cardiac index with SVR with increased dose of dobutamine. Will schedule lasix to maintain intravascular euvolemia  Appreciate pulmonary assistance with vent management with goal for extubation in next few days.  Pt will need to be extubated, have significant improvement from COVID and improvement in functional status prior to consideration for LVAD exchange.  Pt condition remains tenuous.     I have personally managed the following: temporary or durable mechanical circulatory support,anticoagulation and inotrope and parenteral  vasodilator regimen.     Total critical care time spent by me at the bedside and/or in the ICU, excluding procedures was 40 minutes.      This includes review of all data, coordination of care with consulting services, adjustment and personally managing: mechanical circulatory support: LVAD, anticoagulation, and serial assessment of the patient and hemodynamics.     Ashley Araujo MD  Section Head - Advanced Heart Failure, Transplantation and Mechanical Circulatory Support  Director - Adult [facility]ovascular [facility]  Associate Professor of Medicine, [facility]          Subjective     No acute events overnight. Patient continues to be intubated and sedated. His swan site is stable.       Physical Exam:     B/P: 126/88, T: 98.7, P: 88, R: 30    Wt Readings from Last 4 Encounters:   12/13/20 90.9 kg (200 lb 6.4 oz)   07/02/20 107.5 kg (237 lb)   03/04/20 108.7 kg (239 lb 11.2 oz)   12/06/19 108.4 kg (239 lb)         Intake/Output Summary (Last 24 hours) at 12/12/2020 1904  Last data filed at 12/12/2020 1900  Gross per 24 hour   Intake 3176.46 ml   Output 2130 ml   Net 1046.46 ml       General Appearance:  Sedated and intubated  Respiratory: intubated and mechanically ventilated, poor air movement over the bases, no crackles  Cardiovascular: LVAD hum  GI: soft, ND, + BS  Skin: warm,  no rash  Ext: warm to touch, trace edema, easily palpable pulses in the bilateral radials  Neuro: sedated, opens eyes to questioning      Data:     Labs Reviewed on Admission    Troponin   Lab Results   Component Value Date    TROPI 22.609 () 12/06/2020    TROPI 24.782 () 12/06/2020    TROPI 21.830 () 12/06/2020    TROPI 0.471 () 12/04/2020    TROPI 0.475 () 12/04/2020     BMP  Recent Labs   Lab 12/13/20  0344 12/12/20  0337 12/11/20  0434 12/10/20  1524 12/10/20  0414    133 133  --  134   POTASSIUM 4.0 4.1 3.8 4.2 3.9   CHLORIDE 101 98 96  --  98   ASHLEE 8.2* 8.6 8.6  --  8.2*   CO2 26 27  28  --  30   BUN 32* 32* 26  --  25   CR 0.78 0.89 0.77  --  0.80   * 193* 173*  --  186*     CBC  Recent Labs   Lab 12/13/20  0344 12/12/20  1656 12/12/20  0337 12/11/20  0434   WBC 18.4* 18.8* 15.2* 13.5*   RBC 2.80* 3.20* 3.54* 3.70*   HGB 9.0* 10.3* 11.2* 12.0*   HCT 27.5* 31.3* 34.3* 35.8*   MCV 98 98 97 97   MCH 32.1 32.2 31.6 32.4   MCHC 32.7 32.9 32.7 33.5   RDW 15.6* 15.5* 15.1* 15.2*    318 340 341     INR  Recent Labs   Lab 12/08/20  0219 12/07/20  0348   INR 3.92* 2.15*      Hepatic Panel   Lab Results   Component Value Date    AST 44 12/12/2020     Lab Results   Component Value Date    ALT 86 12/12/2020     No results found for: BILICONJ   Lab Results   Component Value Date    BILITOTAL 1.1 12/12/2020     Lab Results   Component Value Date    ALBUMIN 2.4 12/12/2020     Lab Results   Component Value Date    PROTTOTAL 5.8 12/12/2020      Lab Results   Component Value Date    ALKPHOS 97 12/12/2020             Biopsy Method: Dermablade

## 2021-09-10 NOTE — MR AVS SNAPSHOT
After Visit Summary   11/13/2018    Danielito Chu    MRN: [id number]           Patient Information     Date Of Birth          1955        Visit Information        Provider Department      11/13/2018 12:00 PM Soumya Hannon RD [facility]        Today's Diagnoses     Nonischemic cardiomyopathy (H)    -  1       Follow-ups after your visit        Your next 10 appointments already scheduled     Nov 13, 2018  2:30 PM CST   (Arrive by 2:15 PM)   CORE RETURN with Gema Chin NP   [facility])    [address] [place] [address]  [address] [place] [id number] [phone]            Nov 13, 2018  4:00 PM CST   (Arrive by 3:45 PM)   New Patient Visit with Andrei Silva MD   [facility])    [address] [place] [address]  [address] [place] [id number] [phone]            Nov 15, 2018 11:00 AM CST   TELEMEDICINE with Stan Lyle [facility] ([facility])    [address] [place] [id number] [phone]           Note: this is not an onsite visit; there is no need to come to the facility.            Nov 26, 2018  9:45 AM CST   Lab with UC LAB   [facility])    [address] [place] [id number] [phone]            Nov 26, 2018 10:30 AM CST   Implanted Defibulator with Uc Cv Device 1   [facility])    [address] [place] [address]  [id number]               Nov 26, 2018 11:00 AM CST   (Arrive by 10:45 AM)   CORE RETURN with Minoo Lopez NP   [facility] ([facility])    [address] [place] [address]  [address] [place] [id number] [phone]            Jan 03, 2019  9:00 AM CST   Lab with UC LAB   [facility] ([facility])    [address]  1st Floor  [facility] [id number] [phone]            Jan 03, 2019  9:30 AM CST   (Arrive by 9:15 AM)   RETURN HEART FAILURE with Lorena Watkins MD   [facility] ([facility])    [address]  [address]  [facility] [id number] [phone]            Jan 21, 2019  1:00 PM CST   (Arrive by 12:45 PM)   Implanted Defibulator with Uc Cv Device 1   [facility] ([facility])    [address]  3rd Floor  [id number]                 Who to contact     If you have questions or need follow up information about today's clinic visit or your schedule please contact [facility] directly at [phone].  Normal or non-critical lab and imaging results will be communicated to you by MyChart, letter or phone within 4 business days after the clinic has received the results. If you do not hear from us within 7 days, please contact the clinic through MyChart or phone. If you have a critical or abnormal lab result, we will notify you by phone as soon as possible.  Submit refill requests through [employer] or call your pharmacy and they will forward the refill request to us. Please allow 3 business days for your refill to be completed.          Additional Information About Your Visit        Care EveryWhere ID     This is your Care EveryWhere ID. This could be used by other organizations to access your [facility] medical records  [id number]        Your Vitals Were     BMI (Body Mass Index)                   27.91 kg/m2            Blood Pressure from Last 3 Encounters:   10/30/18 102/85   10/17/18 110/71   10/16/18 105/70    Weight from Last 3 Encounters:   11/13/18 85.7 kg (189 lb)   10/30/18 83.9 kg (185 lb)   10/17/18 93.3 kg (205 lb 11.2 oz)              Today, you had the following     No orders found for display       Primary Care Provider Office Phone # Fax #    Bashir  Hietala [phone]       [facility] [address] [facility] [address]        Equal Access to Services     ALEX RODGERS : Hadii aad ku hadleighfrancisco javier Tsai, wabonda pedro pablojoseha, nashta kaasadda gibran, aaron cesarin hayaanuvia burnsayden stoddard laEarnestineeliza pearson. So [facility] [phone].    ATENCIÓN: Si habla español, tiene a stokes disposición servicios gratuitos de asistencia lingüística. Llame al [phone].    We comply with applicable federal civil rights laws and [place] laws. We do not discriminate on the basis of race, color, national origin, age, disability, sex, sexual orientation, or gender identity.            Thank you!     Thank you for choosing [facility]  for your care. Our goal is always to provide you with excellent care. Hearing back from our patients is one way we can continue to improve our services. Please take a few minutes to complete the written survey that you may receive in the mail after your visit with us. Thank you!             Your Updated Medication List - Protect others around you: Learn how to safely use, store and throw away your medicines at www.disposemymeds.org.          This list is accurate as of 11/13/18  2:08 PM.  Always use your most recent med list.                   Brand Name Dispense Instructions for use Diagnosis    artificial saliva Liqd liquid     59 mL    Swish and spit 10 mLs in mouth 4 times daily as needed for dry mouth    Acute on chronic systolic congestive heart failure (H)       aspirin 81 MG EC tablet     30 tablet    Take 1 tablet (81 mg) by mouth daily    Acute systolic congestive heart failure (H)       atorvastatin 10 MG tablet    LIPITOR    90 tablet    Take 1 tablet (10 mg) by mouth daily    Acute systolic congestive heart failure (H)       buPROPion 150 MG 24 hr tablet    WELLBUTRIN XL    60 tablet    Take 1 tablet (150 mg) by mouth daily    Depression, unspecified depression type       captopril 12.5 MG tablet    CAPOTEN    90 tablet     Take 0.5 tablets (6.25 mg) by mouth 3 times daily    Acute systolic congestive heart failure (H)       digoxin 250 MCG tablet    LANOXIN    90 tablet    TAKE 1 TABLET (250 MCG) BY MOUTH DAILY    Acute on chronic systolic heart failure (H)       metFORMIN 500 MG tablet    GLUCOPHAGE     Take 1 tablet (500 mg) by mouth 2 times daily (with meals)    Chronic systolic heart failure (H), Acute systolic congestive heart failure (H)       Potassium Chloride ER 20 MEQ Tbcr     90 tablet    Take 1 tablet (20 mEq) by mouth daily    Acute on chronic systolic congestive heart failure (H)       sildenafil 50 MG tablet    VIAGRA    20 tablet    Take 1 tablet (50 mg) by mouth daily as needed    Drug-induced impotence       thiamine 100 MG tablet     60 tablet    Take 1 tablet (100 mg) by mouth daily    Acute on chronic systolic heart failure (H)       torsemide 20 MG tablet    DEMADEX    180 tablet    Take 3 tablets (60 mg) by mouth 2 times daily    Acute on chronic systolic congestive heart failure (H)       traZODone 100 MG tablet    DESYREL    30 tablet    TAKE 1 TABLET BY MOUTH AT BEDTIME AS NEEDED FOR SLEEP    Insomnia, unspecified type          normal...

## 2022-12-03 NOTE — TELEPHONE ENCOUNTER
Fabiano Chu calls the after hours line to report he was never prescribed metformin he was supposed to be sent home on after his recent hospitalization. He states he has all other meds. He checked his BG today, and reports it is high at 250. He denies any LH/dizziness, N/V, or lethargy. He is requesting the Rx to be sent to [facility] in [place].    I spoke to the pharmacy staff, who was able to take a verbal order for his metformin Rx. Metformin 500mg 1 tab BID #60 with 3 refills.    Jake Edgar  Cardiovascular Fellow     - Presented with symptomatic bradycardia, converted to AFib with slow ventricular response  - Tentative for PPM placement 12/5  - Eliquis to heparin drip during perioperative period  - Continue to monitor on telemetry  - C/w Cardiology/EP recs

## 2023-01-01 NOTE — PROGRESS NOTES
Pt was seen earlier today by Gema Chin CNP. His BB was increased as well as his diuretic. After that visit, pt had device clinic apt. The device nurse noted 240 non sustained VT episodes noted on device interrogation. Pt has a single chamber ICD.   I discussed findings with patient. He is agreeable to return tomorrow to have an echo and BNP.  I discussed findings with Dr. Watkins, who agrees with plan. If no excess volume noted on echo, pt will need RHC per Dr. Posada. Will follow up with pt tomorrow following echo/lab results.  
Statement Selected

## 2023-09-15 NOTE — PLAN OF CARE
Assessment/Plan:    Diagnoses and all orders for this visit:    Chronic nonintractable headache, unspecified headache type  -     Ambulatory Referral to Pediatric Neurology; Future  -     amoxicillin-clavulanate (AUGMENTIN) 875-125 mg per tablet; Take 1 tablet by mouth every 12 (twelve) hours for 14 days  -     ibuprofen (Motrin IB) 200 mg tablet; Take 2 (400mg) tablets for mild to moderate headache and 3 tablets (600mg) for severe headache every 6-8 hours as needed. 17year old male here for headaches. He is non-toxic appearing and in no acute distress. Regarding headaches, does have a history of concussion and intermittently since then does get migraine headaches, at times about once a month. This week however has had symptoms ongoing all week long. He has had minor cold symptoms and is complaining of frontal headache. He has a longstanding history of allergies with enlarged nasal turbinates still seen on exam today. Given history and frontal headache currently will trial treatment with prolonged course of augmentin for possible acute sinusitis type picture. Can treat headache with Motrin, note provided for school. I did discuss however that given several year history of headaches following concussion would recommend neurology evaluation. Mom and patient amenable to this. We did discuss lifestyle changes including decreasing screen time, increasing sleep and exercise. Did recommend headache journal to help identify triggers. Subjective:     History provided by: mother    Patient ID: Randy Record is a 17 y.o. male    Patient has always gotten intermittent headaches since his concussion several headaches per patient. Patient is on phone and video gamses all day long. Headache is more frontal and eyes have been bothering him. Headache has been across forehead- starts on one isde. And migrates to other. NO fevers. Slight cold recently. Patient has been afebrile.   Complains of worsening Problem: Patient Care Overview  Goal: Plan of Care/Patient Progress Review  Outcome: Adequate for Discharge Date Met: 10/30/18  DISCHARGE   Discharged to: Home  Via: Vehicle  Accompanied by: Family, sister  Discharge Instructions: diet, activity, medications, follow up appointments, when to call the MD, and what to watchout for (i.e. s/s of infection, increasing SOB, palpitations, chest pain,)  Prescriptions: To be filled by [facility] discharge pharmacy per pt's request; medication list reviewed & sent with pt  Follow Up Appointments: arranged; information given  Belongings: Sent with pt  IV: out  Telemetry: off  Pt exhibits understanding of above discharge instructions; all questions answered.           with ROM of the head. NO nausea or vomiting, sometimes dizzy. If didn't sleep much gets worse headache. No waking up at night with the headache- worse in AM once start moving. Flourescent lights bother him. He is currently in a school play and has been very active and busy. Currently getting about 6 hours of sleep per night. Mom feels as though increased screen time is also contributing as he is on his phone all day when home. The following portions of the patient's history were reviewed and updated as appropriate:   He  has a past medical history of Asthma, Concussion with no loss of consciousness (04/29/2019), Influenza A (03/14/2019), Sprain of left ankle (05/07/2019), and Visual impairment. He   Patient Active Problem List    Diagnosis Date Noted   • Asthma 06/17/2022   • Seasonal allergic rhinitis 04/20/2019     Current Outpatient Medications on File Prior to Visit   Medication Sig   • albuterol (PROVENTIL HFA,VENTOLIN HFA) 90 mcg/act inhaler Inhale 2 puffs every 6 (six) hours as needed for wheezing or shortness of breath   • cetirizine (ZyrTEC) 10 mg tablet Take 1 tablet (10 mg total) by mouth daily   • fluticasone (Flonase) 50 mcg/act nasal spray 1 spray into each nostril daily     No current facility-administered medications on file prior to visit. He is allergic to no known allergies. .    Review of Systems   Constitutional: Negative for activity change, appetite change and fever. HENT: Positive for congestion. Eyes: Negative for redness. Respiratory: Negative for cough. Gastrointestinal: Negative for abdominal pain, nausea and vomiting. Genitourinary: Negative for decreased urine volume. Musculoskeletal: Negative for myalgias. Skin: Negative for rash. Neurological: Positive for dizziness and headaches. Negative for weakness. Hematological: Negative for adenopathy.        Objective:    Vitals:    09/15/23 0935   BP: (!) 118/63   Pulse: 77   Temp: 98.6 °F (37 °C) Weight: 66.5 kg (146 lb 9.6 oz)   Height: 5' 8.5" (1.74 m)       Physical Exam  Vitals and nursing note reviewed. Exam conducted with a chaperone present. Constitutional:       General: He is not in acute distress. Appearance: Normal appearance. He is not ill-appearing, toxic-appearing or diaphoretic. HENT:      Head: Normocephalic and atraumatic. Right Ear: Tympanic membrane, ear canal and external ear normal.      Left Ear: Tympanic membrane, ear canal and external ear normal.      Nose: Congestion present. No rhinorrhea. Comments: Boggy, enlarged nasal turbinates bilaterally. Mouth/Throat:      Mouth: Mucous membranes are moist.      Pharynx: No oropharyngeal exudate or posterior oropharyngeal erythema. Eyes:      General: No visual field deficit. Right eye: No discharge. Left eye: No discharge. Extraocular Movements: Extraocular movements intact. Conjunctiva/sclera: Conjunctivae normal.      Pupils: Pupils are equal, round, and reactive to light. Cardiovascular:      Rate and Rhythm: Normal rate and regular rhythm. Pulses: Normal pulses. Heart sounds: Normal heart sounds. No murmur heard. Pulmonary:      Effort: Pulmonary effort is normal. No respiratory distress. Breath sounds: Normal breath sounds. No stridor. No wheezing, rhonchi or rales. Chest:      Chest wall: No tenderness. Abdominal:      General: Abdomen is flat. Bowel sounds are normal. There is no distension. Palpations: Abdomen is soft. There is no mass. Tenderness: There is no abdominal tenderness. There is no guarding or rebound. Hernia: No hernia is present. Musculoskeletal:      Cervical back: Normal range of motion and neck supple. No rigidity or tenderness. Lymphadenopathy:      Cervical: No cervical adenopathy. Skin:     General: Skin is warm. Capillary Refill: Capillary refill takes less than 2 seconds. Findings: No rash.    Neurological: General: No focal deficit present. Mental Status: He is alert. GCS: GCS eye subscore is 4. GCS verbal subscore is 5. GCS motor subscore is 6. Cranial Nerves: Cranial nerves 2-12 are intact. No cranial nerve deficit or facial asymmetry. Motor: No weakness, tremor, abnormal muscle tone or seizure activity.       Coordination: Coordination normal.      Gait: Gait and tandem walk normal.      Deep Tendon Reflexes: Reflexes normal.

## 2023-12-22 NOTE — PROGRESS NOTES
Status Change-[facility] 09/10/19    Changed patient's listing status in [employer] from status 4 to status7 for the following reasons:  Travel, see previous note.     Patient aware of change and letter sent per regulatory guidelines, primary cardiology team aware.      
Patient/Caregiver provided printed discharge information.

## 2024-08-21 NOTE — CONSULTS
[facility]     Cardiology Progress Note- Cards 2        Date of Admission:  12/3/2020     Assessment & Plan: SL   Danielito Chu is a 65 year old male admitted on 12/3/2020 with COVID pneumonia. He has a history of NICM (LVEF at dong of 15%) s/p ICD (4/2017), who underwent HM3 LVAD implant on 12/5/18 as DT, now BTT. He has a history of atrial arrythmia, s/p ICD, on amiodarone and metoprolol. He was admitted as transfer to MICU from [facility] for AHRF 2/2 COVID pneumonia. 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 Over the course of hospitalization, his respiratory status progressively deteriorated and he was ultimately transferred back to the MICU and emergently intubated. He then developed an elevated troponin (peak 24.782) without ischemic EKG changes, concerning for myocarditis>ACS/coronary embolism. He then developed progressive decline of LVAD flow and was found to have sub-total occlusion of the LVAD outflow graft on repeat CTA. Started on dobutamine and maintaining adequate hemodynamics.     Today's Recommendations:  - continue asa 162mg BID, dipyridamole 25mg TID  - continue bivalirudin  - TEG and platelet function assay pending  - ASA now assay tomorrow  - repeat limited TTE now on dobutamine to evaluate LV size and aortic valve opening (given normal pulse pressure exam findings)  - trend SvO2 q6hr, CVP q6hr  - diuretics if needed for target CVP of 8-12    #Covid Pneumonia/ARDS  #Acute hypoxic respiratory failure  - appreciate cares per primary medical team  - on dexamethasone  - remdesivir held 2/2 transaminitis     #COVID coagulopathy  #Supratherapuetic INR, reversed  #LVAD outflow thrombus  - continue asa 162mg BID, dipyridamole 25mg TID  - continue bivalirudin  - TEG and platelet function assay pending  - ASA now assay tomorrow  - trend LDH  - pending LDH isoenzymes to differentiate  Miri Florentino is a 78yo woman with a history of DM, HTN, hyperthyroid who presents with leg swelling. She travels back and forth from [place] via automobile, and she recently noticed that her legs have been swelling more than usual. This has been ongoing about a month. She has no chest pain, no dyspnea.    Doppler of the legs was negative for DVT.   The patient had an elevated BNP - the patient was felt to have acute diastolic HF. She was started on diuresis.    The patient reports that her edema has improved. 2d echo results noted.  Will need outpatient follow up.  The patient was discharged in good condition.   cardiac contribution     #Persistent atrial tachycardia  #Sinus Bradycardia  - continue to hold amiodarone  - continue to hold metoprolol     #NICM s/p LVAD 12/5/18  #Myocarditis  #RV Dysfunction  - ok to hold PTA spironolactone and losartan given tenuous hemodynamics  - telemetry monitoring for ventricular dysrrythmias  - pending LDH isoenzymes  - trend LFTs   - trend SvO2 q6h  - continue to trend CVP     #LVAD driveline infection  - cultures NGTD  - appreciate ID recommendations  - continue antibiotics for a total of 7 days of coverage      Entered: Chico Ventura MD 12/08/2020, 7:50 AM       The patient's care was discussed with the Attending Physician, Dr. Weems, Bedside Nurse and Primary team.    Chico Ventura MD   Cardiology Fellow  Pager: [phone]  Amcom: [phone]  [facility]   Please see sign in/sign out for up to date coverage information  ______________________________________________________________________    Interval History   Became hypoxic overnight with repositioning. FiO2 increased. Started on dobutamine with improvement of CI as evidenced by improvement in SvO2. LVAD flows have increased this a.m.    Data reviewed today: I reviewed all medications, new labs and imaging results over the last 24 hours.     Physical Exam   Vital Signs: Temp: 98.4  F (36.9  C) Temp src: Axillary  Pulse: 63   Resp: 20 SpO2: 100 % O2 Device: Mechanical Ventilator    Weight: 203 lbs .7 oz  General Appearance:  Sedated and intubated  Respiratory: intubated and mechanically ventilated, poor air movement over the bases, no crackles  Cardiovascular: LVAD hum  GI: soft, ND, hypoactive BS  Skin: warm,  no rash  Ext: warm to touch, trace edema, easily palpable pulses in the bilateral radials, DP pulses dopplereable b/l  Neuro: sedated, no spontaneous movements     Data   Recent Labs   Lab 12/08/20  0256 12/08/20  0219 12/07/20  1216 12/07/20  1023 12/07/20  0348 12/06/20  1412  12/06/20  1412 12/06/20  0808 12/06/20  0353   WBC 12.1*  --  13.7* 14.8* 11.9*   < >  --   --  13.0*   HGB 12.7*  --  13.4 14.1 14.3   < >  --   --  14.8     --  98 98 98   < >  --   --  97     --  457* 507* 474*   < >  --   --  470*   INR  --  3.92*  --   --  2.15*  --   --   --  5.29*   *  --  148*  --  145*  --   --   --  143   POTASSIUM 4.2  --  3.9  --  3.9  --   --   --  3.9   CHLORIDE 117*  --  117*  --  114*  --   --   --  110*   CO2 25  --  24  --  23  --   --   --  23   BUN 26  --  30  --  30  --   --   --  29   CR 0.90  --  1.01  --  1.00  --   --   --  1.28*   ANIONGAP 5  --  7  --  7  --   --   --  10   ASHLEE 8.0*  --  8.4*  --  8.3*  --   --   --  8.6   *  --  154*  --  189*  --   --   --  162*   ALBUMIN 2.2*  --  2.0*  --  2.2*  --   --   --  2.3*   PROTTOTAL 5.6*  --  5.7*  --  6.0*  --   --   --  6.2*   BILITOTAL 0.9  --  1.4*  --  1.0  --   --   --  1.8*   ALKPHOS 106  --  108  --  109  --   --   --  112   *  --  271*  --  307*  --   --   --  394*   *  --  186*  --  201*  --   --   --  569*   LIPASE  --   --   --   --  133  --   --  116  --    TROPI  --   --   --   --   --   --  22.609* 24.782* 21.830*    < > = values in this interval not displayed.     Recent Results (from the past 24 hour(s))   CT Head w/o Contrast    Narrative    CT HEAD W/O CONTRAST 12/7/2020 9:30 PM    Provided History: r/u embolic stroke  ICD-10:    Comparison: 7/24/2020.    Technique: Using multidetector thin collimation helical acquisition  technique, axial, coronal and sagittal CT images from the skull base  to the vertex were obtained without intravenous contrast.     Findings:      No intracranial hemorrhage. No mass effect. No midline shift. No  extra-axial fluid collection. The gray to white matter differentiation  of the cerebral hemispheres is preserved. Ventricles are proportionate  to the sulci. Diffuse generalized cerebral and cerebellar volume loss.  No sulcal effacement..   The basal cisterns are patent.    The visualized paranasal sinuses are clear. Positional plagiocephaly.  Small bilateral mastoid air cell effusions. Orbits appear  unremarkable. No acute fracture. Layering fluid in the posterior nasal  cavity.      Impression    Impression: No acute intracranial pathology.    I have personally reviewed the examination and initial interpretation  and I agree with the findings.    LUZ MARINA HODGE MD   Echo Complete    Narrative    [id number]  [id number]  [id number]  [id number]^SHIRLENE^MOHAN           [facility],[place]  Echocardiography Laboratory  [address] [place] [id number]     Name: JUAN SHEN  MRN: [id number]  : 1955  Study Date: 2020 12:34 PM  Age: 65 yrs  Gender: Male  [place] [facility] [place] For Study: LVAD failure  Ordering Physician: MOHAN GARBER  Performed By: Charlotte Roca RDCS     BSA: 2.1 m2  Height: 70 in  Weight: 203 lb  BP: 109/54 mmHg  _____________________________________________________________________________  __        Procedure  LVAD Echocardiogram with two-dimensional, color and spectral Doppler  performed.  _____________________________________________________________________________  __        Interpretation Summary  HeartMate 3 LVAD at 5400 RPM.  Moderately dilated LV with severely reduced global LV function, LVEF<20%.  LVIDd= 6.3 cm. RV is mildly dilated with moderately reduced function.  The ventricular septum is midline.  The aortic valve opens with every beat. There is mild AI.  LVAD inflow cannula is visualized in the LV apex. LVAD outflow graft is  visualized in the aorta.  Normal Doppler interrogation of the LVAD inflow cannula and outflow graft.     This study was compared with the study from 2020. The aortic valve open  every heart beat.  _____________________________________________________________________________  __        Left Ventricle  Moderate left ventricular dilation is  present.     Right Ventricle  Mild right ventricular dilation is present. Global right ventricular function  is moderately reduced.     Atria  The atria cannot be assessed.        Mitral Valve  The mitral valve is normal.     Aortic Valve  Mild aortic insufficiency is present.     Vessels  Dilated superior vena cava. Unable to assess mean RA pressure given the  patient is on a ventilator.     Pericardium  No pericardial effusion is present.     Compared to Previous Study  This study was compared with the study from 12/6/2020 . The aortic valve open  every heart beat.     _____________________________________________________________________________  __                       Report approved by: MD Omar Reno 12/08/2020 01:59 PM                 _____________________________________________________________________________  __        Medications     bivalirudin ANTICOAGULANT (ANGIOMAX) 250mg/250mL in 0.9% Sodium Chloride 0.075 mg/kg/hr (12/08/20 1800)     dexmedetomidine 0.7 mcg/kg/hr (12/08/20 1800)     dextrose       DOBUTamine 2.5 mcg/kg/min (12/08/20 1800)     epoprostenol (VELETRI) 20 mcg/mL in sterile water inhalation solution 20 ng/kg/min (12/08/20 1457)     fentaNYL 100 mcg/hr (12/08/20 1800)     - MEDICATION INSTRUCTIONS -       midazolam       norepinephrine       - MEDICATION INSTRUCTIONS -         allopurinol  200 mg Oral Daily     aspirin  162 mg Oral or Feeding Tube BID     dexamethasone  6 mg Oral Q24H     dipyridamole  25 mg Oral or Feeding Tube TID     influenza recomb quadrivalent PF  0.5 mL Intramuscular Prior to discharge     insulin aspart  1-10 Units Subcutaneous Q4H     lidocaine  1 patch Transdermal Q24h    And     lidocaine   Transdermal Q8H     [Held by provider] losartan  25 mg Oral Daily     [Held by provider] metoprolol succinate ER  37.5 mg Oral Daily     oxyCODONE  10 mg Oral Q4H     pantoprazole  40 mg Oral QAM AC     piperacillin-tazobactam  4.5 g Intravenous Q6H      polyethylene glycol  17 g Oral BID     protein modular  1 packet Per Feeding Tube BID     senna-docusate  2 tablet Oral At Bedtime     [Held by provider] spironolactone  37.5 mg Oral Daily     thiamine  100 mg Oral Daily

## 2024-11-11 NOTE — TELEPHONE ENCOUNTER
Spoke with the patient and confirmed PFT and Echocardiogram appointments on 11/12/20.  Informed patient an itinerary can be accessed on [employer].   We will continue to monitor 22:38

## 2025-02-18 NOTE — PROGRESS NOTES
HPI:   Mr. Danielito Chu is a 63 yr old male with a history of NICM (LVEF at dong of 15%) s/p ICD (4/2017),  who underwent HM3 LVAD implant on 12/5/18 as DT ([facility], [facility]     Last visit: 12/6/2019 with Dr. Watkins  He had atrial tachycardia. It was happening often. He was started on amiodarone and plan was for an ICD check 1 week later. The arrythmias were greatly improved after started amiodarone.    This visit  He is not feeling his best today.  He did not sleep well last night because he was up late watching the primary election coverage.  Was unable to sleep much once he went to sleep and now has a mild headache.  He attributes this to the poor sleep.  He also has little bit of left ear pressure today.  No facial drooping per patient, slurred speech, confusion, weakness on one side of his body.  His INRs most recently have been normal.    He denies any shortness of breath at rest.  He can walk three quarters of a mile before he feels short of breath.  This is about the same as last year.  He sleeps on 2 pillows, no orthopnea, no PND.  No lower extremity edema.  He does feel like he has little bit of bloating in his abdomen but this feels related to gas.  He does have a little bit of dizziness with very quick position changes.  He had had palpitations in the past but it is been 8 weeks since this happened.  No chest pain.  His appetite is stable but he is working very hard to control his weight so that he can stay transplant eligible.  He denies any blood in the urine or blood in the stool.  He denies symptoms of a driveline infection.  He denies stroke symptoms.  He does have headaches as above.  No concerning neuro symptoms.    Cardiac Medications  ASA 81 mg daily  Coumadin  Amiodarone 200 mg daily  Bumex 2 mg BID  KCl 20 BID  Losartan 25 mg daily  Metoprolol succinate 25 mg daily  Spironolactone 37.5 mg daily    PAST MEDICAL HISTORY:  Past Medical History:   Diagnosis  Date     Acute systolic congestive heart failure (H) 2017     Diabetes (H)      HTN (hypertension)      Hyperlipidemia      Liver disease      LVAD (left ventricular assist device) present (H)     HM 3      Marijuana abuse      Mitral regurgitation      Nocturnal oxygen desaturation 3/29/2018     Nonischemic cardiomyopathy (H) 2017     SHIRLEY (obstructive sleep apnea)      Pacemaker 2017    ICD 17     Situational mixed anxiety and depressive disorder        FAMILY HISTORY:  Family History   Problem Relation Age of Onset     Heart Failure Father          at age 86     Heart Failure Paternal Uncle          at 66      Myocardial Infarction Paternal Uncle          at age 62     Coronary Artery Disease Mother          during CABG at age 41       SOCIAL HISTORY:  Social History     Socioeconomic History     Marital status: Single     Spouse name: Not on file     Number of children: Not on file     Years of education: Not on file     Highest education level: Not on file   Occupational History     Not on file   Social Needs     Financial resource strain: Not on file     Food insecurity:     Worry: Not on file     Inability: Not on file     Transportation needs:     Medical: Not on file     Non-medical: Not on file   Tobacco Use     Smoking status: Former Smoker     Packs/day: 0.30     Years: 20.00     Pack years: 6.00     Types: Cigarettes     Smokeless tobacco: Never Used     Tobacco comment: quit 3/2017   Substance and Sexual Activity     Alcohol use: No     Frequency: Never     Comment: rare     Drug use: No     Sexual activity: Not on file   Lifestyle     Physical activity:     Days per week: Not on file     Minutes per session: Not on file     Stress: Not on file   Relationships     Social connections:     Talks on phone: Not on file     Gets together: Not on file     Attends Congregation service: Not on file     Active member of club or organization: Not on file     Attends meetings of  [facility] provides services at a reduced cost to those who are determined to be eligible through [facility]’s financial assistance program. Information regarding [facility]’s financial assistance program can be found by going to [email or web address] or by calling [phone]. clubs or organizations: Not on file     Relationship status: Not on file     Intimate partner violence:     Fear of current or ex partner: Not on file     Emotionally abused: Not on file     Physically abused: Not on file     Forced sexual activity: Not on file   Other Topics Concern     Parent/sibling w/ CABG, MI or angioplasty before 65F 55M? Not Asked   Social History Narrative     Not on file       CURRENT MEDICATIONS:  allopurinol (ZYLOPRIM) 100 MG tablet, Take 2 tablets (200 mg) by mouth daily  amiodarone 200 MG PO tablet, Take 1 tablet (200 mg) by mouth daily  aspirin (ASA) 81 MG chewable tablet, Take 1 tablet (81 mg) by mouth daily  Blood Glucose Monitoring Suppl (BLOOD GLUCOSE MONITOR SYSTEM) w/Device KIT, three times a week  bumetanide (BUMEX) 1 MG tablet, Take 2 tablets (2 mg) by mouth 2 times daily  colchicine (COLCRYS) 0.6 MG tablet, Take 1 tablet (0.6 mg) by mouth daily  losartan (COZAAR) 25 MG tablet, Take 1 tablet (25 mg) by mouth daily  potassium chloride ER (K-DUR/KLOR-CON M) 10 MEQ CR tablet, Take 2 tablets (20 mEq) by mouth 2 times daily  spironolactone (ALDACTONE) 25 MG tablet, Take 1.5 tablets (37.5 mg) by mouth daily  warfarin (COUMADIN) 2 MG tablet, Take 3 mg PO daily at 6 pm until next INR check, then dose per INR goal 2-3  amoxicillin (AMOXIL) 500 MG capsule, Take 4 capsules (2,000 mg) by mouth once as needed (Take 4 capsules (2000mg), one hour before any dental cleanings or procedures) (Patient not taking: Reported on 9/10/2019)  enoxaparin (LOVENOX) 100 MG/ML syringe, Inject 100 mg (1 ml) every 12 hours as directed by the Anticoagulation Clinic. (Patient not taking: Reported on 9/10/2019)  tacrolimus (PROTOPIC) 0.1 % external ointment, Apply topically 2 times daily (Patient not taking: Reported on 3/4/2020)  traZODone (DESYREL) 100 MG tablet, TAKE 1 TABLET BY MOUTH EVERY DAY AT BEDTIME AS NEEDED FOR SLEEP (Patient not taking: Reported on 3/4/2020)  vitamin B1 (THIAMINE) 100 MG tablet,  "Take 1 tablet (100 mg) by mouth daily (Patient not taking: Reported on 9/10/2019)    No current facility-administered medications on file prior to visit.       ROS:   CONSTITUTIONAL: Denies fever, chills,  or weight fluctuations.   HEENT: Denies vision changes and changes in speech.   CV: Refer to HPI.   PULMONARY:Refer to HPI.   GI:Denies nausea, vomiting, diarrhea, and abdominal pain. Bowel movements are regular.   :Denies urinary alterations, dysuria, urinary frequency, hematuria, and abnormal drainage.   EXT: See HPI  SKIN:Denies abnormal rashes or lesions.   MUSCULOSKELETAL:Denies upper or lower extremity weakness and pain.   NEUROLOGIC:Denies seizures, or upper or lower extremity paresthesia.     EXAM:  BP (!) 80/0 (BP Location: Right arm, Patient Position: Chair, Cuff Size: Adult Regular)   Ht 1.765 m (5' 9.5\")   Wt 108.7 kg (239 lb 11.2 oz)   BMI 34.89 kg/m      GENERAL: Appears comfortable, in no distress speaking in full sentences and able to communicate all needs.  HEENT: Eye symmetrical and without discharge or icterus bilaterally. Mucous membranes moist and without lesions.  NECK: Supple, JVD 2 cm above clavicle at 60 degrees.   CV: Hum of LVAD, no adventitious sounds  RESPIRATORY: Respirations regular, even, and unlabored. Lungs CTA throughout.   GI: Soft and non distended with normoactive bowel sounds present in all quadrants. No tenderness, rebound, guarding. No organomegaly.   EXTREMITIES: No peripheral edema.  Non-pulsatile.   NEUROLOGIC: Alert and interacting appropriately.  Cranial nerves II through XII intact.  5 out of 5 upper and lower extremity strength.  No pronator drift.  Good rapid alternating movements.  Gait observed and was steady.  No focal deficits.   MUSCULOSKELETAL: No joint swelling or tenderness.   SKIN: No jaundice. No rashes or lesions. Driveline dressing clean dry intact.    Labs - as reviewed in clinic with patient today:  CBC RESULTS:  Lab Results   Component Value Date "    WBC 11.0 03/04/2020    RBC 4.84 03/04/2020    HGB 16.2 03/04/2020    HCT 47.2 03/04/2020    MCV 98 03/04/2020    MCH 33.5 (H) 03/04/2020    MCHC 34.3 03/04/2020    RDW 14.0 03/04/2020     03/04/2020       CMP RESULTS:  Lab Results   Component Value Date     03/04/2020    POTASSIUM 3.7 03/04/2020    CHLORIDE 99 03/04/2020    CO2 29 03/04/2020    ANIONGAP 6 03/04/2020     (H) 03/04/2020    BUN 13 03/04/2020    CR 1.26 (H) 03/04/2020    GFRESTIMATED 60 (L) 03/04/2020    GFRESTBLACK 69 03/04/2020    ASHLEE 9.2 03/04/2020    BILITOTAL 1.0 03/04/2020    ALBUMIN 4.0 03/04/2020    ALKPHOS 100 03/04/2020    ALT 53 03/04/2020    AST 38 03/04/2020        INR RESULTS:  Lab Results   Component Value Date    INR 2.81 (H) 03/04/2020       Lab Results   Component Value Date    MAG 2.1 12/13/2018     Lab Results   Component Value Date    NTBNPI 1,970 (H) 10/27/2018     Lab Results   Component Value Date    NTBNP 2,244 (H) 12/20/2018       Assessment and Plan:   Mr. Danielito Chu is a 63 yr old male with a history of NICM (LVEF at dong of 15%) s/p ICD (4/2017),  who underwent HM3 LVAD implant on 12/5/18 as DT ([facility] He presents today for LVAD follow-up. He appears slightly hypovolemic, which appears to be secondary to not drinking a lot of water as he is worried about his weight.  Otherwise he is generally well-appearing today.    He has a headache which appears to be due to sleep deprivation last night.  No concerning neuro symptoms.  Discussed risks and benefits with the patient and we decided to defer a head CT today.    He did have some ventricular arrhythmias on his ICD check.  In reaching out to the device clinic to see how long these lasted.  We will increase his metoprolol as below.    # Chronic systolic heart failure secondary to NICM s/p 3 LVAD as BTT on 12/5/2018, listed as status 4.  Stage D. NYHA Class II.    Fluid status hypovolemic-near euvolemic,  reported is drinking less water to prepare for his weight today.  Encouraged to drink a little more water today.  ACEi/ARB Losartan 25 mg daily  BB Metoprolol 25 mg daily  Aldosterone antagonist Spironolatone 37.5 mg daily  SCD prophylaxis ICD  NSAID use: Contraindicated  Sleep Apnea Evaluation: Did not discuss in clinic today.  BP: Map is 80, goal is 65-85  LDH trends: 231, stable  Anticoagulation: warfarin INR goal 2-3 . 2.81 today  Antiplatelet: Aspirin 81 mg    VAD interrogation today: VAD interrogation reviewed with VAD coordinator. Agree with findings.  Occasional PI events 2 speed drops. no power spikes  or other findings suspicious of pump malfunction noted.     # Hypertension: Controlled.  Continue metoprolol XL and losartan.     # NSVT: Increase Metoprolol as outlined above.    # Obesity BMI of 34.9 today.  He is successfully kept his weight under the threshold for transplant listing, but he is having difficulty losing any more weight and this is his goal.  -Refer back to the dietitian.  #CKD stage II  - Cr slightly up today, has not been drinking much water to prepare for wt check today. Recheck BMP next week when he is drinking more regularly.    Follow up : BMP next week (instructed to drink more water, Cr was slightly up today and he had been avoiding water given weight check).  Dr. Watkins in 3 months with a right heart cath.  NP in 6 months.            40 minutes spent face-to-face with patient, >50% in counseling and/or coordination of care as described above      PITER Oscra  3/4/2020          CC  SELF, REFERRED

## (undated) DEVICE — LINEN TOWEL PACK X30 5481

## (undated) DEVICE — PUNCH AORTIC 5.0MM LONG AP-550

## (undated) DEVICE — PACK HEART RIGHT CUSTOM SAN32RHF18

## (undated) DEVICE — Device

## (undated) DEVICE — SU PLEDGET SOFT TFE 13MMX7MMX1.5MM D7044

## (undated) DEVICE — DRAIN CHEST TUBE 32FR STR 8032

## (undated) DEVICE — DRAIN CHEST TUBE RIGHT ANGLED 28FR 8128

## (undated) DEVICE — BLADE CLIPPER SGL USE 9680

## (undated) DEVICE — DRSG ABDOMINAL 07 1/2X8" 7197D

## (undated) DEVICE — BONE WAX 2.5GM W31G

## (undated) DEVICE — ESU ELEC BLADE 2.75" COATED/INSULATED E1455

## (undated) DEVICE — SU PROLENE 4-0 SHDA 36" 8521H

## (undated) DEVICE — DRSG DRAIN 4X4" 7086

## (undated) DEVICE — CONNECTOR DRAIN CHEST Y EXTENSION SET 19909

## (undated) DEVICE — GLOVE PROTEXIS POWDER FREE SMT 7.5  2D72PT75X

## (undated) DEVICE — CONNECTOR BLAKE DRAIN SGL BCC1

## (undated) DEVICE — DRSG TELFA 3"X8" NON25720

## (undated) DEVICE — SU VICRYL 3-0 KS 27" UND J663H

## (undated) DEVICE — SUCTION CATH AIRLIFE TRI-FLO W/CONTROL PORT 14FR  T60C

## (undated) DEVICE — SU ETHIBOND 5 LRDA 30" B499T

## (undated) DEVICE — NDL ANGIOCATH 14GA 1.25" 4048

## (undated) DEVICE — DRSG PRIMAPORE 02X3" 7133

## (undated) DEVICE — PROBE RECTAL MONATHERM 9FR 90050

## (undated) DEVICE — INTRO SHEATH 7FRX10CM PINNACLE RSS702

## (undated) DEVICE — TAPE MEDIPORE 4"X2YD 2864

## (undated) DEVICE — KIT RIGHT HEART CATH 60130719

## (undated) DEVICE — DRSG PRIMAPORE 03 1/8X6" 66000318

## (undated) DEVICE — CLIP HORIZON MED BLUE 002200

## (undated) DEVICE — DEFIB PRO-PADZ LVP LQD GEL ADULT 8900-2105-01

## (undated) DEVICE — SOL NACL 0.9% 10ML VIAL 0409-4888-02

## (undated) DEVICE — SU DERMABOND ADVANCED .7ML DNX12

## (undated) DEVICE — SUCTION DRY CHEST DRAIN OASIS 3600-100

## (undated) DEVICE — SU VICRYL 3-0 FS-1 27" J442H

## (undated) DEVICE — SU PLEDGET SOFT TFE 3/8"X3/26"X1/16" PCP40

## (undated) DEVICE — DRSG TELFA 3X8" 1238

## (undated) DEVICE — SU PROLENE 4-0 RB-1DA 36" 8557H

## (undated) DEVICE — SU ETHIBOND 0 TIE 6X30" X306H

## (undated) DEVICE — DRSG TEGADERM 8X12" 1629

## (undated) DEVICE — SU PROLENE 3-0 SHDA 36" 8522H

## (undated) DEVICE — SUCTION MANIFOLD DORNOCH ULTRA CART UL-CL500

## (undated) DEVICE — PREP CHLORHEXIDINE 4% 4OZ (HIBICLENS) 57504

## (undated) DEVICE — SU ETHIBOND 2-0 MHDA 36" X843H

## (undated) DEVICE — BASIN SET SINGLE STERILE 13752-624

## (undated) DEVICE — CATH FOLEY COUDE TIEMAN 16FR 30ML LATEX 0103SI16

## (undated) DEVICE — SU PROLENE 5-0 RB-2DA 30" 8710H

## (undated) DEVICE — SU VICRYL 2-0 CT-1 27" UND J259H

## (undated) DEVICE — SU ETHIBOND 0 CT-1 CR 8X18" CX21D

## (undated) DEVICE — DRAPE IOBAN INCISE 23X17" 6650EZ

## (undated) DEVICE — THORATEC HEARTMATE 3 SYSTEM CONTROLLER

## (undated) DEVICE — TUBING INSUFFLATION W/FILTER CPC TO LUER 620-030-301

## (undated) DEVICE — TIES CABLE STERILE 8" 17133/30

## (undated) DEVICE — SU VICRYL 0 CTX 36" J370H

## (undated) DEVICE — PROTECTOR ARM ONE-STEP TRENDELENBURG 40418

## (undated) DEVICE — SU ETHIBOND 2-0 SHDA 30" X563H

## (undated) DEVICE — SYR BIOGLUE PREFILLED 5ML BG3515-5-US

## (undated) DEVICE — PACK ADULT HEART UMMC PV15CG92D

## (undated) DEVICE — TIES BANDING T50R

## (undated) DEVICE — WIPES FOLEY CARE SURESTEP PROVON DFC100

## (undated) DEVICE — KIT RIGHT HEART CATH H965601307191

## (undated) DEVICE — SU ETHIBOND 3-0 BBDA 36" X588H

## (undated) DEVICE — ESU ELEC BLADE 6" COATED E1450-6

## (undated) DEVICE — DRAPE BACK TABLE  44X90" 8377

## (undated) DEVICE — DRAPE WARMER 66X44" ORS-300

## (undated) DEVICE — BLADE SAW STERNAL 20X30MM KM-32

## (undated) DEVICE — ESU HOLSTER PLASTIC DISP E2400

## (undated) DEVICE — LINEN TOWEL PACK X6 WHITE 5487

## (undated) RX ORDER — DIPHENHYDRAMINE HYDROCHLORIDE 50 MG/ML
INJECTION INTRAMUSCULAR; INTRAVENOUS
Status: DISPENSED
Start: 2018-11-21

## (undated) RX ORDER — CEFAZOLIN SODIUM 1 G/3ML
INJECTION, POWDER, FOR SOLUTION INTRAMUSCULAR; INTRAVENOUS
Status: DISPENSED
Start: 2018-12-05

## (undated) RX ORDER — SIMETHICONE 20 MG/.3ML
EMULSION ORAL
Status: DISPENSED
Start: 2018-11-21

## (undated) RX ORDER — FLUMAZENIL 0.1 MG/ML
INJECTION, SOLUTION INTRAVENOUS
Status: DISPENSED
Start: 2017-04-07

## (undated) RX ORDER — LIDOCAINE HYDROCHLORIDE 10 MG/ML
INJECTION, SOLUTION EPIDURAL; INFILTRATION; INTRACAUDAL; PERINEURAL
Status: DISPENSED
Start: 2018-11-19

## (undated) RX ORDER — HEPARIN SODIUM 1000 [USP'U]/ML
INJECTION, SOLUTION INTRAVENOUS; SUBCUTANEOUS
Status: DISPENSED
Start: 2018-12-05

## (undated) RX ORDER — LIDOCAINE HYDROCHLORIDE 20 MG/ML
INJECTION, SOLUTION EPIDURAL; INFILTRATION; INTRACAUDAL; PERINEURAL
Status: DISPENSED
Start: 2018-12-05

## (undated) RX ORDER — LIDOCAINE 40 MG/G
CREAM TOPICAL
Status: DISPENSED
Start: 2020-01-01

## (undated) RX ORDER — CEFAZOLIN SODIUM 1 G/3ML
INJECTION, POWDER, FOR SOLUTION INTRAMUSCULAR; INTRAVENOUS
Status: DISPENSED
Start: 2017-04-07

## (undated) RX ORDER — PHENYLEPHRINE HCL IN 0.9% NACL 1 MG/10 ML
SYRINGE (ML) INTRAVENOUS
Status: DISPENSED
Start: 2018-12-05

## (undated) RX ORDER — FENTANYL CITRATE 50 UG/ML
INJECTION, SOLUTION INTRAMUSCULAR; INTRAVENOUS
Status: DISPENSED
Start: 2018-11-19

## (undated) RX ORDER — HEPARIN SODIUM 1000 [USP'U]/ML
INJECTION, SOLUTION INTRAVENOUS; SUBCUTANEOUS
Status: DISPENSED
Start: 2017-04-03

## (undated) RX ORDER — LIDOCAINE 40 MG/G
CREAM TOPICAL
Status: DISPENSED
Start: 2019-05-09

## (undated) RX ORDER — FENTANYL CITRATE 50 UG/ML
INJECTION, SOLUTION INTRAMUSCULAR; INTRAVENOUS
Status: DISPENSED
Start: 2018-12-05

## (undated) RX ORDER — LIDOCAINE HYDROCHLORIDE 10 MG/ML
INJECTION, SOLUTION EPIDURAL; INFILTRATION; INTRACAUDAL; PERINEURAL
Status: DISPENSED
Start: 2018-11-16

## (undated) RX ORDER — PROPOFOL 10 MG/ML
INJECTION, EMULSION INTRAVENOUS
Status: DISPENSED
Start: 2018-12-05

## (undated) RX ORDER — FENTANYL CITRATE 50 UG/ML
INJECTION, SOLUTION INTRAMUSCULAR; INTRAVENOUS
Status: DISPENSED
Start: 2018-11-21

## (undated) RX ORDER — FENTANYL CITRATE 50 UG/ML
INJECTION, SOLUTION INTRAMUSCULAR; INTRAVENOUS
Status: DISPENSED
Start: 2017-04-07

## (undated) RX ORDER — PROTAMINE SULFATE 10 MG/ML
INJECTION, SOLUTION INTRAVENOUS
Status: DISPENSED
Start: 2018-12-05

## (undated) RX ORDER — LIDOCAINE HYDROCHLORIDE 10 MG/ML
INJECTION, SOLUTION EPIDURAL; INFILTRATION; INTRACAUDAL; PERINEURAL
Status: DISPENSED
Start: 2018-10-20

## (undated) RX ORDER — ETOMIDATE 2 MG/ML
INJECTION INTRAVENOUS
Status: DISPENSED
Start: 2018-12-05

## (undated) RX ORDER — NALOXONE HYDROCHLORIDE 0.4 MG/ML
INJECTION, SOLUTION INTRAMUSCULAR; INTRAVENOUS; SUBCUTANEOUS
Status: DISPENSED
Start: 2017-04-07

## (undated) RX ORDER — LIDOCAINE HYDROCHLORIDE 10 MG/ML
INJECTION, SOLUTION EPIDURAL; INFILTRATION; INTRACAUDAL; PERINEURAL
Status: DISPENSED
Start: 2018-06-11

## (undated) RX ORDER — DIPHENHYDRAMINE HYDROCHLORIDE 50 MG/ML
INJECTION INTRAMUSCULAR; INTRAVENOUS
Status: DISPENSED
Start: 2017-04-03

## (undated) RX ORDER — LEVOFLOXACIN 5 MG/ML
INJECTION, SOLUTION INTRAVENOUS
Status: DISPENSED
Start: 2018-12-05

## (undated) RX ORDER — LIDOCAINE 40 MG/G
CREAM TOPICAL
Status: DISPENSED
Start: 2018-06-11

## (undated) RX ORDER — LIDOCAINE HYDROCHLORIDE 20 MG/ML
JELLY TOPICAL
Status: DISPENSED
Start: 2019-08-19

## (undated) RX ORDER — FENTANYL CITRATE 50 UG/ML
INJECTION, SOLUTION INTRAMUSCULAR; INTRAVENOUS
Status: DISPENSED
Start: 2017-04-03